# Patient Record
Sex: FEMALE | Race: WHITE | NOT HISPANIC OR LATINO | Employment: OTHER | URBAN - METROPOLITAN AREA
[De-identification: names, ages, dates, MRNs, and addresses within clinical notes are randomized per-mention and may not be internally consistent; named-entity substitution may affect disease eponyms.]

---

## 2017-03-09 ENCOUNTER — TELEPHONE (OUTPATIENT)
Dept: INTERNAL MEDICINE | Facility: CLINIC | Age: 67
End: 2017-03-09

## 2017-03-09 NOTE — TELEPHONE ENCOUNTER
----- Message from Niyah Sutherland sent at 3/9/2017  2:59 PM CST -----  Contact: Self  Pt would like a call back in regards to scheduling an appt to establish care.     Pt can be contacted at [phone]

## 2017-03-09 NOTE — TELEPHONE ENCOUNTER
Left message to inform pt that dr first avaliable appt to est care is not until 03/20 so she should give us a call back so we can help her schedule.

## 2017-03-14 ENCOUNTER — TELEPHONE (OUTPATIENT)
Dept: ORTHOPEDICS | Facility: CLINIC | Age: 67
End: 2017-03-14

## 2017-03-14 DIAGNOSIS — R52 PAIN: Primary | ICD-10-CM

## 2017-03-15 ENCOUNTER — HOSPITAL ENCOUNTER (OUTPATIENT)
Dept: RADIOLOGY | Facility: OTHER | Age: 67
Discharge: HOME | End: 2017-03-15
Attending: PLASTIC SURGERY
Payer: COMMERCIAL

## 2017-03-15 ENCOUNTER — OFFICE VISIT (OUTPATIENT)
Dept: ORTHOPEDICS | Facility: CLINIC | Age: 67
End: 2017-03-15
Payer: COMMERCIAL

## 2017-03-15 VITALS
DIASTOLIC BLOOD PRESSURE: 84 MMHG | WEIGHT: 170 LBS | HEART RATE: 71 BPM | BODY MASS INDEX: 29.02 KG/M2 | SYSTOLIC BLOOD PRESSURE: 147 MMHG | HEIGHT: 64 IN

## 2017-03-15 DIAGNOSIS — M25.531 WRIST PAIN, ACUTE, RIGHT: ICD-10-CM

## 2017-03-15 DIAGNOSIS — R52 PAIN: ICD-10-CM

## 2017-03-15 DIAGNOSIS — M19.041 PRIMARY OSTEOARTHRITIS OF BOTH HANDS: Primary | ICD-10-CM

## 2017-03-15 DIAGNOSIS — M19.042 PRIMARY OSTEOARTHRITIS OF BOTH HANDS: Primary | ICD-10-CM

## 2017-03-15 PROCEDURE — 1159F MED LIST DOCD IN RCRD: CPT | Mod: S$GLB,,, | Performed by: PLASTIC SURGERY

## 2017-03-15 PROCEDURE — 99999 PR PBB SHADOW E&M-EST. PATIENT-LVL III: CPT | Mod: PBBFAC,,, | Performed by: PLASTIC SURGERY

## 2017-03-15 PROCEDURE — 99203 OFFICE O/P NEW LOW 30 MIN: CPT | Mod: S$GLB,,, | Performed by: PLASTIC SURGERY

## 2017-03-15 PROCEDURE — 3077F SYST BP >= 140 MM HG: CPT | Mod: S$GLB,,, | Performed by: PLASTIC SURGERY

## 2017-03-15 PROCEDURE — 1157F ADVNC CARE PLAN IN RCRD: CPT | Mod: S$GLB,,, | Performed by: PLASTIC SURGERY

## 2017-03-15 PROCEDURE — 1160F RVW MEDS BY RX/DR IN RCRD: CPT | Mod: S$GLB,,, | Performed by: PLASTIC SURGERY

## 2017-03-15 PROCEDURE — 73130 X-RAY EXAM OF HAND: CPT | Mod: 26,RT,, | Performed by: RADIOLOGY

## 2017-03-15 PROCEDURE — 1125F AMNT PAIN NOTED PAIN PRSNT: CPT | Mod: S$GLB,,, | Performed by: PLASTIC SURGERY

## 2017-03-15 PROCEDURE — 3079F DIAST BP 80-89 MM HG: CPT | Mod: S$GLB,,, | Performed by: PLASTIC SURGERY

## 2017-03-15 PROCEDURE — 73130 X-RAY EXAM OF HAND: CPT | Mod: TC,RT

## 2017-03-15 NOTE — PROGRESS NOTES
HISTORY OF PRESENT ILLNESS:  Ms. Floyd is a right-hand dominant female who   presents today with a one-month history of ulnar-sided wrist pain.  She states   that she tends to cook a lot and also enjoys gardening.  She has noticed   increased swelling and pain on the ulnar side of her wrist.  She has been   diagnosed with carpal tunnel in the past; however, she has not undergone any   previous surgeries.  She also complains of occasional numbness and tingling   throughout the hand.  She denies any specific instances of trauma recently or   previously.  She recently moved from [facility] and is establishing new   physicians here in the .  She denies any loss of using of the hand.  She has   been using Tylenol for occasional pain control.  She also has a splint, which   she states has become rather worn and would like a new splint today in clinic.    She has no other complaints today.    Past Medical History:   Diagnosis Date    CAD (coronary artery disease)     Outside [facility] 6/2014: 20% mLAD, 50% mCx, 20%, mRCA    Carotid stenosis     R CCA stent, L CCA 60%    Dyslipidemia     ESBL (extended spectrum beta-lactamase) producing bacteria infection     UTI    Hypertension     SAH (subarachnoid hemorrhage)     1999, from ruptured aneurysm, s/p clip       History reviewed. No pertinent surgical history.    Social History     Social History    Marital status:      Spouse name: N/A    Number of children: N/A    Years of education: N/A     Occupational History    Not on file.     Social History Main Topics    Smoking status: Former Smoker     Quit date: 1/1/1999    Smokeless tobacco: Not on file    Alcohol use 4.2 oz/week     7 Glasses of wine per week    Drug use: Not on file    Sexual activity: Not on file     Other Topics Concern    Not on file     Social History Narrative       Current Outpatient Prescriptions on File Prior to Visit   Medication Sig Dispense Refill    amlodipine-valsartan (EXFORGE)  " mg per tablet Take 1 tablet by mouth once daily. 30 tablet 11    aspirin (ECOTRIN) 81 MG EC tablet Take 1 tablet (81 mg total) by mouth once daily.  0    atorvastatin (LIPITOR) 80 MG tablet Take 1 tablet (80 mg total) by mouth once daily. 30 tablet 11    carvedilol (COREG) 12.5 MG tablet Take 1 tablet (12.5 mg total) by mouth 2 (two) times daily with meals. 60 tablet 11    ezetimibe (ZETIA) 10 mg tablet Take 1 tablet (10 mg total) by mouth once daily. 30 tablet 11    hydrochlorothiazide (HYDRODIURIL) 25 MG tablet Take 1 tablet (25 mg total) by mouth once daily. 30 tablet 11    pantoprazole (PROTONIX) 40 MG tablet Take 40 mg by mouth once daily.       No current facility-administered medications on file prior to visit.        Review of patient's allergies indicates:  No Known Allergies    Review of Systems:  Constitutional: no fever or chills  ENT: no nasal congestion or sore throat  Respiratory: no cough or shortness of breath  Cardiovascular: no chest pain or palpitations  Gastrointestinal: no nausea or vomiting  Genitourinary: no hematuria or dysuria  Integument/Breast: no rash or pruritis  Hematologic/Lymphatic: no easy bruising or lymphadenopathy  Musculoskeletal: see HPI  Neurological: no seizures or tremors  Behavioral/Psych: no auditory or visual hallucinations      PHYSICAL EXAM    Vitals:    03/15/17 0810 03/15/17 0811   BP: (!) 147/84    Pulse: 71    Weight: 77.1 kg (170 lb)    Height: 5' 4" (1.626 m)    PainSc:   3   3   PainLoc: Hand          PHYSICAL EXAMINATION:  GENERAL:  No acute distress, alert and oriented x3, cooperative, well nourished.  LUNGS:  Nonlabored on room air.  CARDIOVASCULAR:  Distal pulses intact.  Good capillary refill.  No clubbing,   cyanosis or edema.  EXTREMITIES:  Right upper extremity:  Negative Tinel's over the cubital tunnel   and carpal tunnel.  She has a negative Durkan sign.  She has a positive Tinel's   over the [place] canal.  She has full active range of " motion of all digits at all   joints.  There are Heberden's and Dalton's nodules noted on the PIP and DIP   joints throughout.  There is ulnar drift at the MCP joints with no external   evidence of subluxation at the MCP joints.  There is stable DRUJ joint to shuck,   mild tenderness to compression or swelling on the ulnar side of the wrist near   the ulnar fovea with pain to direct pressure.  There is no subluxation of the   ECU tendon.  She has near full extension and flexion of the wrist at the   radiocarpal joint.    RADIOLOGY IMPRESSION:    PA, lateral, and oblique radiographs of the right hand were obtained.  The bones are intact.  There is no evidence for acute fracture or bone destruction.  There are prominent degenerative changes identified within some of the small joints of the hand, most prominently the interphalangeal joint of the right thumb, the proximal interphalangeal joint of the right 4th finger, and the distal interphalangeal joint of the right 2nd finger.  There appears to be mild subluxation involving the right 2nd and 3rd metacarpal phalangeal joints as well as the DIP joint of the right index finger.  No bony erosions are identified.  Soft tissues are unremarkable.   Impression     No evidence for acute fracture, bone destruction, or dislocation.  Osteoarthritic type changes involving multiple joints within the right hand as detailed above.  Mild subluxation involving the right 2nd and 3rd metacarpal phalangeal joints and the distal interphalangeal joint of the right index finger.           ASSESSMENT:  Ulnar-sided wrist pain.    PLAN:  Based on the x-ray findings and findings on exam, it appears that the   patient has diffuse arthritis throughout the joints of the hand.  There is   subluxation of the second and third MCP joints of the right hand, concerning for   possible underlying rheumatoid arthritis.  At this time, I do not see any   definitive signs of trauma or pathology that would be  attributing to her   ulnar-sided wrist pain.  This may be attributed to underlying rheumatoid   arthritis caused by synovitis.  Therefore, I recommended that the patient rest   her wrist.  She was provided with a wrist splint today in clinic.  She is to   wear this around the clock except for hygiene.  She was encouraged to use   Tylenol for anti-inflammatory properties.  I discussed obtaining an MRI;   however, the patient is unable to receive this test due to an aneurysmal clip   within the brain.  Therefore, we will monitor her symptoms after immobilization   for four weeks.  I will obtain a Rheumatology workup today to assess for any   autoimmune conditions.  The patient will follow up with me in two months when   she returns from her foreign travels.  She agreed with the above assessment and   plan.  All questions and concerns were addressed prior to discharge.      MARIAN/CHITRA  dd: 03/15/2017 09:56:47 (CDT)  td: 03/16/2017 01:31:29 (CDT)  Doc ID   #[id number]  Job ID #[id number]    CC:       Dictation Confirmation Code: [id number]  Ar Gordon Jr. MD  03/15/2017  9:51 AM

## 2017-03-20 ENCOUNTER — LAB VISIT (OUTPATIENT)
Dept: LAB | Facility: OTHER | Age: 67
End: 2017-03-20
Attending: INTERNAL MEDICINE
Payer: COMMERCIAL

## 2017-03-20 ENCOUNTER — OFFICE VISIT (OUTPATIENT)
Dept: INTERNAL MEDICINE | Facility: CLINIC | Age: 67
End: 2017-03-20
Payer: COMMERCIAL

## 2017-03-20 VITALS
WEIGHT: 166 LBS | DIASTOLIC BLOOD PRESSURE: 70 MMHG | HEIGHT: 64 IN | SYSTOLIC BLOOD PRESSURE: 120 MMHG | BODY MASS INDEX: 28.34 KG/M2 | OXYGEN SATURATION: 96 % | HEART RATE: 84 BPM

## 2017-03-20 DIAGNOSIS — Z11.59 NEED FOR HEPATITIS C SCREENING TEST: ICD-10-CM

## 2017-03-20 DIAGNOSIS — N28.9 RENAL INSUFFICIENCY: ICD-10-CM

## 2017-03-20 DIAGNOSIS — K21.9 GASTROESOPHAGEAL REFLUX DISEASE WITHOUT ESOPHAGITIS: ICD-10-CM

## 2017-03-20 DIAGNOSIS — I10 ESSENTIAL HYPERTENSION: ICD-10-CM

## 2017-03-20 DIAGNOSIS — I10 ESSENTIAL HYPERTENSION: Primary | ICD-10-CM

## 2017-03-20 LAB
ANION GAP SERPL CALC-SCNC: 11 MMOL/L
BACTERIA #/AREA URNS HPF: NORMAL /HPF
BILIRUB UR QL STRIP: NEGATIVE
BUN SERPL-MCNC: 32 MG/DL
CALCIUM SERPL-MCNC: 9.3 MG/DL
CHLORIDE SERPL-SCNC: 105 MMOL/L
CLARITY UR: CLEAR
CO2 SERPL-SCNC: 23 MMOL/L
COLOR UR: YELLOW
CREAT SERPL-MCNC: 2 MG/DL
EST. GFR  (AFRICAN AMERICAN): 29 ML/MIN/1.73 M^2
EST. GFR  (NON AFRICAN AMERICAN): 25 ML/MIN/1.73 M^2
GLUCOSE SERPL-MCNC: 102 MG/DL
GLUCOSE UR QL STRIP: NEGATIVE
HGB UR QL STRIP: NEGATIVE
KETONES UR QL STRIP: NEGATIVE
LEUKOCYTE ESTERASE UR QL STRIP: ABNORMAL
MICROSCOPIC COMMENT: NORMAL
NITRITE UR QL STRIP: NEGATIVE
PH UR STRIP: 6 [PH] (ref 5–8)
POTASSIUM SERPL-SCNC: 4.6 MMOL/L
PROT UR QL STRIP: NEGATIVE
SODIUM SERPL-SCNC: 139 MMOL/L
SP GR UR STRIP: 1.01 (ref 1–1.03)
SQUAMOUS #/AREA URNS HPF: 2 /HPF
TSH SERPL DL<=0.005 MIU/L-ACNC: 1.31 UIU/ML
URN SPEC COLLECT METH UR: ABNORMAL
UROBILINOGEN UR STRIP-ACNC: NEGATIVE EU/DL
WBC #/AREA URNS HPF: 3 /HPF (ref 0–5)

## 2017-03-20 PROCEDURE — 3074F SYST BP LT 130 MM HG: CPT | Mod: S$GLB,,, | Performed by: INTERNAL MEDICINE

## 2017-03-20 PROCEDURE — 1160F RVW MEDS BY RX/DR IN RCRD: CPT | Mod: S$GLB,,, | Performed by: INTERNAL MEDICINE

## 2017-03-20 PROCEDURE — 3078F DIAST BP <80 MM HG: CPT | Mod: S$GLB,,, | Performed by: INTERNAL MEDICINE

## 2017-03-20 PROCEDURE — 36415 COLL VENOUS BLD VENIPUNCTURE: CPT

## 2017-03-20 PROCEDURE — 1126F AMNT PAIN NOTED NONE PRSNT: CPT | Mod: S$GLB,,, | Performed by: INTERNAL MEDICINE

## 2017-03-20 PROCEDURE — 99999 PR PBB SHADOW E&M-EST. PATIENT-LVL III: CPT | Mod: PBBFAC,,, | Performed by: INTERNAL MEDICINE

## 2017-03-20 PROCEDURE — 1157F ADVNC CARE PLAN IN RCRD: CPT | Mod: S$GLB,,, | Performed by: INTERNAL MEDICINE

## 2017-03-20 PROCEDURE — 81000 URINALYSIS NONAUTO W/SCOPE: CPT

## 2017-03-20 PROCEDURE — 80048 BASIC METABOLIC PNL TOTAL CA: CPT

## 2017-03-20 PROCEDURE — 99214 OFFICE O/P EST MOD 30 MIN: CPT | Mod: S$GLB,,, | Performed by: INTERNAL MEDICINE

## 2017-03-20 PROCEDURE — 84443 ASSAY THYROID STIM HORMONE: CPT

## 2017-03-20 PROCEDURE — 86803 HEPATITIS C AB TEST: CPT

## 2017-03-20 PROCEDURE — 1159F MED LIST DOCD IN RCRD: CPT | Mod: S$GLB,,, | Performed by: INTERNAL MEDICINE

## 2017-03-20 NOTE — MR AVS SNAPSHOT
[facility]  [address]  [place] [address]  Phone: [phone]  Fax: [phone]                  Jessica Floyd   3/20/2017 1:20 PM   Office Visit    Description:  Female : 1950   Provider:  Jose Peacock MD   Department:  [facility]           Reason for Visit     Annual Exam           Diagnoses this Visit        Comments    Essential hypertension    -  Primary     Renal insufficiency         Gastroesophageal reflux disease without esophagitis         Need for hepatitis C screening test                To Do List           Goals (5 Years of Data)     None      Follow-Up and Disposition     Return in about 6 months (around 2017), or if symptoms worsen or fail to improve.      [facility]     [facility] On Call Nurse Care Line -  Assistance  Registered nurses in the [facility] On Call Center provide clinical advisement, health education, appointment booking, and other advisory services.  Call for this free service at [phone].             Medications           Message regarding Medications     Verify the changes and/or additions to your medication regime listed below are the same as discussed with your clinician today.  If any of these changes or additions are incorrect, please notify your healthcare provider.             Verify that the below list of medications is an accurate representation of the medications you are currently taking.  If none reported, the list may be blank. If incorrect, please contact your healthcare provider. Carry this list with you in case of emergency.           Current Medications     amlodipine-valsartan (EXFORGE)  mg per tablet Take 1 tablet by mouth once daily.    aspirin (ECOTRIN) 81 MG EC tablet Take 1 tablet (81 mg total) by mouth once daily.    atorvastatin (LIPITOR) 80 MG tablet Take 1 tablet (80 mg total) by mouth once daily.    carvedilol (COREG) 12.5 MG tablet Take 1 tablet (12.5 mg total) by mouth  "2 (two) times daily with meals.    ezetimibe (ZETIA) 10 mg tablet Take 1 tablet (10 mg total) by mouth once daily.    hydrochlorothiazide (HYDRODIURIL) 25 MG tablet Take 1 tablet (25 mg total) by mouth once daily.    pantoprazole (PROTONIX) 40 MG tablet Take 40 mg by mouth once daily.           Clinical Reference Information           Your Vitals Were     BP Pulse Height Weight SpO2 BMI    120/70 84 5' 4" (1.626 m) 75.3 kg (166 lb 0.1 oz) 96% 28.49 kg/m2      Blood Pressure          Most Recent Value    BP  120/70      Allergies as of 3/20/2017     No Known Allergies      Immunizations Administered on Date of Encounter - 3/20/2017     None      Orders Placed During Today's Visit      Normal Orders This Visit    URINALYSIS     Future Labs/Procedures Expected by Expires    Basic metabolic panel  3/20/2017 5/19/2018    Hepatitis C antibody  3/20/2017 5/12/2018    TSH  3/20/2017 5/19/2018      [facility] Sign-Up     Activating your [facility] account is as easy as 1-2-3!     1) Visit [email or web address], select Sign Up Now, enter this activation code and your date of birth, then select Next.  [email or web address]  Expires: 5/4/2017  2:04 PM      2) Create a username and password to use when you visit [facility] in the future and select a security question in case you lose your password and select Next.    3) Enter your e-mail address and click Sign Up!    Additional Information  If you have questions, please e-mail [email or web address] or call [phone] to talk to our [facility] staff. Remember, [facility] is NOT to be used for urgent needs. For medical emergencies, dial 911.         Language Assistance Services     ATTENTION: Language assistance services are available, free of charge. Please call [phone].      ATENCIÓN: Si habla español, tiene a rhodes disposición servicios gratuitos de asistencia lingüística. Llame al [phone].     CHÚ Ý: N?u b?n nói Ti?ng Vi?t, có các d?ch v? h? tr? ngôn ng? mi?n phí dành cho b?n. " G?i s? [phone].         [facility] complies with applicable Federal civil rights laws and does not discriminate on the basis of race, color, national origin, age, disability, or sex.

## 2017-03-20 NOTE — PROGRESS NOTES
Subjective:       Patient ID: Jessica Floyd is a 66 y.o. female.    Chief Complaint: Annual Exam    HPI Comments:   New pt to this office.      She wants to switch her care to here.      Feeling well today.  No c/o.      PMH  -Ruptured cerebral aneurysm s/p repair w/clip.  Unable to get MRI for this reason.  Has a lasting L visual field cut.    -H/o multiple syncopal episodes  -HTN.  Pt says she adjusts her hctz dose QAM based on her home BP readings.   -Carotid artery stenosis s/p 2 stents.  R side.    -Unknown 'repair' on L knee dating to age 11.  She is unsure of the details.  She gets some pain from this.    -GERD.  Takes pantoprazole most days and this controls her sx.   -Denies having any heart disease.  Problem list includes CAD.        Review of Systems   Constitutional: Negative.    HENT: Negative.    Eyes: Negative.    Respiratory: Negative.    Cardiovascular: Negative.    Gastrointestinal: Negative.    Genitourinary: Negative.    Musculoskeletal: Negative.    Skin: Negative.    Neurological: Negative.    Psychiatric/Behavioral: Negative.        Past Medical History:   Diagnosis Date    CAD (coronary artery disease)     Outside [facility] 6/2014: 20% mLAD, 50% mCx, 20%, mRCA    Carotid stenosis     R CCA stent, L CCA 60%    Dyslipidemia     ESBL (extended spectrum beta-lactamase) producing bacteria infection     UTI    Hypertension     SAH (subarachnoid hemorrhage)     1999, from ruptured aneurysm, s/p clip       History reviewed. No pertinent surgical history.    Family History   Problem Relation Age of Onset    Aneurysm Mother     Alcohol abuse Father     Heart disease Brother        Social History     Social History    Marital status:      Spouse name: N/A    Number of children: N/A    Years of education: N/A     Occupational History    Retired      Social History Main Topics    Smoking status: Former Smoker     Quit date: 1/1/1999    Smokeless tobacco: None    Alcohol use 4.2 oz/week      "7 Glasses of wine per week    Drug use: No    Sexual activity: Not Asked     Other Topics Concern    None     Social History Narrative    .  Has 3 grown daughters.   lives in [place].       Objective:       Vitals:    03/20/17 1334   BP: 120/70   Pulse: 84   SpO2: 96%   Weight: 75.3 kg (166 lb 0.1 oz)   Height: 5' 4" (1.626 m)     Physical Exam   Constitutional: She is oriented to person, place, and time. She appears well-developed and well-nourished.   HENT:   Head: Normocephalic and atraumatic.   Right Ear: Tympanic membrane, external ear and ear canal normal.   Left Ear: Tympanic membrane, external ear and ear canal normal.   Mouth/Throat: Oropharynx is clear and moist. No oropharyngeal exudate or posterior oropharyngeal erythema.   Eyes: Conjunctivae and EOM are normal. Pupils are equal, round, and reactive to light.   Neck: Normal range of motion. Neck supple. Carotid bruit is not present. No thyromegaly present.   Cardiovascular: Normal rate, regular rhythm and normal heart sounds.  Exam reveals no gallop and no friction rub.    No murmur heard.  Pulmonary/Chest: Effort normal and breath sounds normal. She has no wheezes. She has no rales.   Abdominal: Soft. Bowel sounds are normal. She exhibits no distension. There is no tenderness. There is no rebound and no guarding.   Musculoskeletal: Normal range of motion. She exhibits no edema.   Lymphadenopathy:     She has no cervical adenopathy.   Neurological: She is alert and oriented to person, place, and time. She has normal strength. She displays no atrophy and no tremor. No sensory deficit. She exhibits normal muscle tone. Gait normal.   Skin: Skin is warm and dry. No rash noted.   Psychiatric: She has a normal mood and affect. Her behavior is normal. Thought content normal.   Vitals reviewed.      Assessment:       1. Essential hypertension    2. Renal insufficiency    3. Gastroesophageal reflux disease without esophagitis    4. Need for " hepatitis C screening test        Plan:           HTN - At goal.  Cont current tx.      Renal insufficiency - Seen on labs from 5d ago done by pt's Ortho hand specialist.  Will repeat Cr, K.  Get UA.      GERD - Controlled with PPI which pt takes most days.  Cont this.      HM - Chk HCV Ab.

## 2017-03-21 ENCOUNTER — TELEPHONE (OUTPATIENT)
Dept: INTERNAL MEDICINE | Facility: CLINIC | Age: 67
End: 2017-03-21

## 2017-03-21 DIAGNOSIS — N28.9 RENAL INSUFFICIENCY: Primary | ICD-10-CM

## 2017-03-21 LAB — HCV AB SERPL QL IA: NEGATIVE

## 2017-03-21 RX ORDER — AMLODIPINE BESYLATE 10 MG/1
10 TABLET ORAL DAILY
Qty: 90 TABLET | Refills: 1 | Status: SHIPPED | OUTPATIENT
Start: 2017-03-21 | End: 2017-09-06 | Stop reason: SDUPTHER

## 2017-03-21 NOTE — TELEPHONE ENCOUNTER
Please inform pt to stop her amlodipine-valsartan medication for now due to kidney dysfunction.  Since this is a combination medication and she only needs to stop the valsartan portion, if she would like I can make a prescription for amlodipine by itself.      Please also advise her to avoid all non-steroidal anti-inflammatory medications such as ibuprofen and naproxen for the time being.      We will need to repeat her labs again this coming Friday or early next week.  Please schedule these.

## 2017-03-21 NOTE — TELEPHONE ENCOUNTER
Pt informed of to stop taking Exforge due to kidney dysfunction and she should stop taking non steriodial med such as ibuprofen and naproxen. Pt labs schedule for repeat on 03/29. Pt also would like for you to send the script for Amlodipine by itself. Tammi advise

## 2017-03-29 ENCOUNTER — LAB VISIT (OUTPATIENT)
Dept: LAB | Facility: OTHER | Age: 67
End: 2017-03-29
Attending: INTERNAL MEDICINE
Payer: COMMERCIAL

## 2017-03-29 DIAGNOSIS — N28.9 RENAL INSUFFICIENCY: ICD-10-CM

## 2017-03-29 LAB
ANION GAP SERPL CALC-SCNC: 11 MMOL/L
BUN SERPL-MCNC: 15 MG/DL
CALCIUM SERPL-MCNC: 9.5 MG/DL
CHLORIDE SERPL-SCNC: 100 MMOL/L
CO2 SERPL-SCNC: 29 MMOL/L
CREAT SERPL-MCNC: 1.3 MG/DL
EST. GFR  (AFRICAN AMERICAN): 49 ML/MIN/1.73 M^2
EST. GFR  (NON AFRICAN AMERICAN): 43 ML/MIN/1.73 M^2
GLUCOSE SERPL-MCNC: 95 MG/DL
POTASSIUM SERPL-SCNC: 3.6 MMOL/L
SODIUM SERPL-SCNC: 140 MMOL/L

## 2017-03-29 PROCEDURE — 36415 COLL VENOUS BLD VENIPUNCTURE: CPT

## 2017-03-29 PROCEDURE — 80048 BASIC METABOLIC PNL TOTAL CA: CPT

## 2017-03-30 ENCOUNTER — TELEPHONE (OUTPATIENT)
Dept: INTERNAL MEDICINE | Facility: CLINIC | Age: 67
End: 2017-03-30

## 2017-03-30 DIAGNOSIS — N28.9 RENAL INSUFFICIENCY: Primary | ICD-10-CM

## 2017-03-30 NOTE — TELEPHONE ENCOUNTER
Please advise pt that her kidney function has improved and also remains somewhat abnormal.  We do not have old records to help me to determine if it has returned to her baseline or not.      Please find out if she has been monitoring her blood pressure and, if so, what has it been like over the past week or so?

## 2017-03-30 NOTE — TELEPHONE ENCOUNTER
I'm happy to hear her BP has been doing well without the valsartan and I think she should continue without it for now.  She should continue to monitor her BP and let us know if it is frequently running above 140 or above 90.      I would like to check her kidney function again in 3-4 weeks.  Please arrange for that.

## 2017-03-30 NOTE — TELEPHONE ENCOUNTER
Pt inform of  wants her to continue monitoring her B/P and labs set on 05/11 when she returns to clinic pt states she will not be back until that time,

## 2017-03-30 NOTE — TELEPHONE ENCOUNTER
Pt inform that her kidney furnctions are improved and she states for the last week her B/P has been running 127/133 for systolic and between 70/87 for bottom number. Pt also states she will be out of state and the only way to reach her would be by texting. Please advise.

## 2017-05-05 ENCOUNTER — TELEPHONE (OUTPATIENT)
Dept: INTERNAL MEDICINE | Facility: CLINIC | Age: 67
End: 2017-05-05

## 2017-05-15 ENCOUNTER — OFFICE VISIT (OUTPATIENT)
Dept: INTERNAL MEDICINE | Facility: CLINIC | Age: 67
End: 2017-05-15
Payer: COMMERCIAL

## 2017-05-15 ENCOUNTER — LAB VISIT (OUTPATIENT)
Dept: LAB | Facility: OTHER | Age: 67
End: 2017-05-15
Attending: INTERNAL MEDICINE
Payer: COMMERCIAL

## 2017-05-15 VITALS
BODY MASS INDEX: 27.92 KG/M2 | HEIGHT: 64 IN | DIASTOLIC BLOOD PRESSURE: 78 MMHG | OXYGEN SATURATION: 97 % | WEIGHT: 163.56 LBS | HEART RATE: 75 BPM | SYSTOLIC BLOOD PRESSURE: 128 MMHG

## 2017-05-15 DIAGNOSIS — I10 ESSENTIAL HYPERTENSION: Primary | ICD-10-CM

## 2017-05-15 DIAGNOSIS — N28.9 RENAL INSUFFICIENCY: ICD-10-CM

## 2017-05-15 DIAGNOSIS — Z00.00 ROUTINE HISTORY AND PHYSICAL EXAMINATION OF ADULT: ICD-10-CM

## 2017-05-15 LAB
ALBUMIN SERPL BCP-MCNC: 3.8 G/DL
ALP SERPL-CCNC: 57 U/L
ALT SERPL W/O P-5'-P-CCNC: 19 U/L
ANION GAP SERPL CALC-SCNC: 13 MMOL/L
ANION GAP SERPL CALC-SCNC: 13 MMOL/L
AST SERPL-CCNC: 23 U/L
BASOPHILS # BLD AUTO: 0.11 K/UL
BASOPHILS NFR BLD: 1.6 %
BILIRUB SERPL-MCNC: 0.7 MG/DL
BUN SERPL-MCNC: 18 MG/DL
BUN SERPL-MCNC: 18 MG/DL
CALCIUM SERPL-MCNC: 9.4 MG/DL
CALCIUM SERPL-MCNC: 9.4 MG/DL
CHLORIDE SERPL-SCNC: 102 MMOL/L
CHLORIDE SERPL-SCNC: 102 MMOL/L
CO2 SERPL-SCNC: 25 MMOL/L
CO2 SERPL-SCNC: 25 MMOL/L
CREAT SERPL-MCNC: 1.2 MG/DL
CREAT SERPL-MCNC: 1.2 MG/DL
DIFFERENTIAL METHOD: ABNORMAL
EOSINOPHIL # BLD AUTO: 0.4 K/UL
EOSINOPHIL NFR BLD: 5.3 %
ERYTHROCYTE [DISTWIDTH] IN BLOOD BY AUTOMATED COUNT: 14.5 %
EST. GFR  (AFRICAN AMERICAN): 54 ML/MIN/1.73 M^2
EST. GFR  (AFRICAN AMERICAN): 54 ML/MIN/1.73 M^2
EST. GFR  (NON AFRICAN AMERICAN): 47 ML/MIN/1.73 M^2
EST. GFR  (NON AFRICAN AMERICAN): 47 ML/MIN/1.73 M^2
GLUCOSE SERPL-MCNC: 88 MG/DL
GLUCOSE SERPL-MCNC: 88 MG/DL
HCT VFR BLD AUTO: 38.6 %
HCV AB SERPL QL IA: NEGATIVE
HGB BLD-MCNC: 12.9 G/DL
LYMPHOCYTES # BLD AUTO: 2.2 K/UL
LYMPHOCYTES NFR BLD: 32.2 %
MCH RBC QN AUTO: 33.6 PG
MCHC RBC AUTO-ENTMCNC: 33.4 %
MCV RBC AUTO: 101 FL
MONOCYTES # BLD AUTO: 1 K/UL
MONOCYTES NFR BLD: 14.9 %
NEUTROPHILS # BLD AUTO: 3.2 K/UL
NEUTROPHILS NFR BLD: 45.7 %
PLATELET # BLD AUTO: 272 K/UL
PMV BLD AUTO: 10.8 FL
POTASSIUM SERPL-SCNC: 3.9 MMOL/L
POTASSIUM SERPL-SCNC: 3.9 MMOL/L
PROT SERPL-MCNC: 7.3 G/DL
RBC # BLD AUTO: 3.84 M/UL
SODIUM SERPL-SCNC: 140 MMOL/L
SODIUM SERPL-SCNC: 140 MMOL/L
WBC # BLD AUTO: 6.92 K/UL

## 2017-05-15 PROCEDURE — 99999 PR PBB SHADOW E&M-EST. PATIENT-LVL III: CPT | Mod: PBBFAC,,, | Performed by: INTERNAL MEDICINE

## 2017-05-15 PROCEDURE — 1125F AMNT PAIN NOTED PAIN PRSNT: CPT | Mod: S$GLB,,, | Performed by: INTERNAL MEDICINE

## 2017-05-15 PROCEDURE — 1160F RVW MEDS BY RX/DR IN RCRD: CPT | Mod: S$GLB,,, | Performed by: INTERNAL MEDICINE

## 2017-05-15 PROCEDURE — 1157F ADVNC CARE PLAN IN RCRD: CPT | Mod: 8P,S$GLB,, | Performed by: INTERNAL MEDICINE

## 2017-05-15 PROCEDURE — 85025 COMPLETE CBC W/AUTO DIFF WBC: CPT

## 2017-05-15 PROCEDURE — 86803 HEPATITIS C AB TEST: CPT

## 2017-05-15 PROCEDURE — 99214 OFFICE O/P EST MOD 30 MIN: CPT | Mod: S$GLB,,, | Performed by: INTERNAL MEDICINE

## 2017-05-15 PROCEDURE — 3078F DIAST BP <80 MM HG: CPT | Mod: S$GLB,,, | Performed by: INTERNAL MEDICINE

## 2017-05-15 PROCEDURE — 1159F MED LIST DOCD IN RCRD: CPT | Mod: S$GLB,,, | Performed by: INTERNAL MEDICINE

## 2017-05-15 PROCEDURE — 80053 COMPREHEN METABOLIC PANEL: CPT

## 2017-05-15 PROCEDURE — 36415 COLL VENOUS BLD VENIPUNCTURE: CPT

## 2017-05-15 PROCEDURE — 3074F SYST BP LT 130 MM HG: CPT | Mod: S$GLB,,, | Performed by: INTERNAL MEDICINE

## 2017-05-15 NOTE — PROGRESS NOTES
"Subjective:       Patient ID: Jessica Floyd is a 66 y.o. female.    Chief Complaint: Hypertension    HPI Comments:   Pt here for f/u HTN and renal insufficiency.      Feeling well.  No new c/o.     BP has been doing well when pt checks it at home.  She takes hctz either 1/2 pill or 1 whole pill, depending on BP.      She is having foot surgery next week with Dr Ervin.          Hypertension       Review of Systems   Constitutional: Negative.    HENT: Negative.    Eyes: Negative.    Respiratory: Negative.        Objective:       Vitals:    05/15/17 1150   BP: 128/78   Pulse: 75   SpO2: 97%   Weight: 74.2 kg (163 lb 9.3 oz)   Height: 5' 4" (1.626 m)     Physical Exam   Constitutional: She appears well-developed and well-nourished. No distress.   HENT:   Head: Normocephalic and atraumatic.   Eyes: Conjunctivae and EOM are normal. Pupils are equal, round, and reactive to light.   Skin: No rash noted. She is not diaphoretic.   Psychiatric: She has a normal mood and affect. Her behavior is normal. Thought content normal.       Assessment:       1. Essential hypertension    2. Renal insufficiency        Plan:           HTN - At goal.  Cont current tx.    Renal insufficiency - Labs drawn earlier and are pending.  Pt may have CKD.  We are trying to establish a baseline for her renal fxn.  If she does, would benefit from an ACEI, which perhaps could take place of her HCTZ.        "

## 2017-05-15 NOTE — MR AVS SNAPSHOT
[facility]  [address]  [place] [address]  Phone: [phone]  Fax: [phone]                  Jessica Floyd   5/15/2017 11:40 AM   Office Visit    Description:  Female : 1950   Provider:  Jose Peacock MD   Department:  [facility]           Reason for Visit     Hypertension           Diagnoses this Visit        Comments    Essential hypertension    -  Primary     Renal insufficiency                To Do List           Goals (5 Years of Data)     None      Follow-Up and Disposition     Return in about 6 months (around 11/15/2017), or if symptoms worsen or fail to improve.      [facility]     [facility] Nurse Care Line -  Assistance  Unless otherwise directed by your provider, please contact [facility], our nurse care line that is available for  assistance.     Registered nurses in the [facility] Center provide: appointment scheduling, clinical advisement, health education, and other advisory services.  Call: [phone] (toll free)               Medications           Message regarding Medications     Verify the changes and/or additions to your medication regime listed below are the same as discussed with your clinician today.  If any of these changes or additions are incorrect, please notify your healthcare provider.             Verify that the below list of medications is an accurate representation of the medications you are currently taking.  If none reported, the list may be blank. If incorrect, please contact your healthcare provider. Carry this list with you in case of emergency.           Current Medications     amlodipine (NORVASC) 10 MG tablet Take 1 tablet (10 mg total) by mouth once daily.    aspirin (ECOTRIN) 81 MG EC tablet Take 1 tablet (81 mg total) by mouth once daily.    atorvastatin (LIPITOR) 80 MG tablet Take 1 tablet (80 mg total) by mouth once daily.    carvedilol (COREG) 12.5 MG tablet Take 1  "tablet (12.5 mg total) by mouth 2 (two) times daily with meals.    hydrochlorothiazide (HYDRODIURIL) 25 MG tablet Take 1 tablet (25 mg total) by mouth once daily.    pantoprazole (PROTONIX) 40 MG tablet Take 40 mg by mouth once daily.    ezetimibe (ZETIA) 10 mg tablet Take 1 tablet (10 mg total) by mouth once daily.           Clinical Reference Information           Your Vitals Were     BP Pulse Height Weight SpO2 BMI    128/78 75 5' 4" (1.626 m) 74.2 kg (163 lb 9.3 oz) 97% 28.08 kg/m2      Blood Pressure          Most Recent Value    BP  128/78      Allergies as of 5/15/2017     No Known Allergies      Immunizations Administered on Date of Encounter - 5/15/2017     None      [facility] Sign-Up     Activating your [facility] account is as easy as 1-2-3!     1) Visit [email or web address], select Sign Up Now, enter this activation code and your date of birth, then select Next.  [email or web address]  Expires: 6/29/2017 12:06 PM      2) Create a username and password to use when you visit [facility] in the future and select a security question in case you lose your password and select Next.    3) Enter your e-mail address and click Sign Up!    Additional Information  If you have questions, please e-mail [email or web address] or call [phone] to talk to our [facility] staff. Remember, [facility] is NOT to be used for urgent needs. For medical emergencies, dial 911.         Language Assistance Services     ATTENTION: Language assistance services are available, free of charge. Please call [phone].      ATENCIÓN: Si habla español, tiene a rhodes disposición servicios gratuitos de asistencia lingüística. Llame al [phone].     CHÚ Ý: N?u b?n nói Ti?ng Vi?t, có các d?ch v? h? tr? ngôn ng? mi?n phí dành cho b?n. G?i s? [phone].         [facility] complies with applicable Federal civil rights laws and does not discriminate on the basis of race, color, national origin, age, disability, or sex.        "

## 2017-05-16 ENCOUNTER — TELEPHONE (OUTPATIENT)
Dept: INTERNAL MEDICINE | Facility: CLINIC | Age: 67
End: 2017-05-16

## 2017-05-16 DIAGNOSIS — N18.30 CKD (CHRONIC KIDNEY DISEASE) STAGE 3, GFR 30-59 ML/MIN: ICD-10-CM

## 2017-05-25 ENCOUNTER — OFFICE VISIT (OUTPATIENT)
Dept: CARDIOLOGY | Facility: CLINIC | Age: 67
End: 2017-05-25
Payer: COMMERCIAL

## 2017-05-25 ENCOUNTER — NURSE TRIAGE (OUTPATIENT)
Dept: ADMINISTRATIVE | Facility: CLINIC | Age: 67
End: 2017-05-25

## 2017-05-25 ENCOUNTER — TELEPHONE (OUTPATIENT)
Dept: INTERNAL MEDICINE | Facility: CLINIC | Age: 67
End: 2017-05-25

## 2017-05-25 ENCOUNTER — HOSPITAL ENCOUNTER (OUTPATIENT)
Dept: CARDIOLOGY | Facility: CLINIC | Age: 67
Discharge: HOME | End: 2017-05-25
Payer: COMMERCIAL

## 2017-05-25 ENCOUNTER — HOSPITAL ENCOUNTER (OUTPATIENT)
Facility: HOSPITAL | Age: 67
LOS: 1 days | Discharge: HOME | End: 2017-05-26
Attending: EMERGENCY MEDICINE | Admitting: INTERNAL MEDICINE
Payer: COMMERCIAL

## 2017-05-25 VITALS — HEIGHT: 65 IN | HEART RATE: 84 BPM | SYSTOLIC BLOOD PRESSURE: 102 MMHG | DIASTOLIC BLOOD PRESSURE: 66 MMHG

## 2017-05-25 DIAGNOSIS — I25.10 CORONARY ARTERY DISEASE INVOLVING NATIVE CORONARY ARTERY OF NATIVE HEART WITHOUT ANGINA PECTORIS: ICD-10-CM

## 2017-05-25 DIAGNOSIS — R07.9 CHEST PAIN: ICD-10-CM

## 2017-05-25 DIAGNOSIS — E78.5 DYSLIPIDEMIA: ICD-10-CM

## 2017-05-25 DIAGNOSIS — I10 ESSENTIAL HYPERTENSION: ICD-10-CM

## 2017-05-25 DIAGNOSIS — R07.9 CHEST PAIN, UNSPECIFIED TYPE: Primary | ICD-10-CM

## 2017-05-25 DIAGNOSIS — R07.9 CHEST PAIN AT REST: Primary | ICD-10-CM

## 2017-05-25 DIAGNOSIS — R07.9 CHEST PAIN, UNSPECIFIED TYPE: ICD-10-CM

## 2017-05-25 DIAGNOSIS — R07.9 CHEST PAIN, RULE OUT ACUTE MYOCARDIAL INFARCTION: ICD-10-CM

## 2017-05-25 LAB
ALBUMIN SERPL BCP-MCNC: 4 G/DL
ALP SERPL-CCNC: 75 U/L
ALT SERPL W/O P-5'-P-CCNC: 33 U/L
ANION GAP SERPL CALC-SCNC: 13 MMOL/L
AST SERPL-CCNC: 36 U/L
BASOPHILS # BLD AUTO: 0.04 K/UL
BASOPHILS NFR BLD: 0.3 %
BILIRUB SERPL-MCNC: 0.8 MG/DL
BNP SERPL-MCNC: 75 PG/ML
BUN SERPL-MCNC: 14 MG/DL
CALCIUM SERPL-MCNC: 10 MG/DL
CHLORIDE SERPL-SCNC: 96 MMOL/L
CO2 SERPL-SCNC: 28 MMOL/L
CREAT SERPL-MCNC: 1.1 MG/DL
DIFFERENTIAL METHOD: ABNORMAL
EOSINOPHIL # BLD AUTO: 0 K/UL
EOSINOPHIL NFR BLD: 0.2 %
ERYTHROCYTE [DISTWIDTH] IN BLOOD BY AUTOMATED COUNT: 14.6 %
EST. GFR  (AFRICAN AMERICAN): >60 ML/MIN/1.73 M^2
EST. GFR  (NON AFRICAN AMERICAN): 52.1 ML/MIN/1.73 M^2
GLUCOSE SERPL-MCNC: 118 MG/DL
HCT VFR BLD AUTO: 42 %
HGB BLD-MCNC: 14.4 G/DL
LYMPHOCYTES # BLD AUTO: 2.7 K/UL
LYMPHOCYTES NFR BLD: 22.3 %
MCH RBC QN AUTO: 34 PG
MCHC RBC AUTO-ENTMCNC: 34.3 %
MCV RBC AUTO: 99 FL
MONOCYTES # BLD AUTO: 1.8 K/UL
MONOCYTES NFR BLD: 14.9 %
NEUTROPHILS # BLD AUTO: 7.4 K/UL
NEUTROPHILS NFR BLD: 62 %
PLATELET # BLD AUTO: 267 K/UL
PMV BLD AUTO: 10.3 FL
POTASSIUM SERPL-SCNC: 3 MMOL/L
PROT SERPL-MCNC: 7.9 G/DL
RBC # BLD AUTO: 4.23 M/UL
SODIUM SERPL-SCNC: 137 MMOL/L
TROPONIN I SERPL DL<=0.01 NG/ML-MCNC: <0.006 NG/ML
WBC # BLD AUTO: 11.91 K/UL

## 2017-05-25 PROCEDURE — 63600175 PHARM REV CODE 636 W HCPCS: Performed by: PHYSICIAN ASSISTANT

## 2017-05-25 PROCEDURE — 84443 ASSAY THYROID STIM HORMONE: CPT

## 2017-05-25 PROCEDURE — 80053 COMPREHEN METABOLIC PANEL: CPT

## 2017-05-25 PROCEDURE — 96374 THER/PROPH/DIAG INJ IV PUSH: CPT

## 2017-05-25 PROCEDURE — 99285 EMERGENCY DEPT VISIT HI MDM: CPT | Mod: ,,, | Performed by: PHYSICIAN ASSISTANT

## 2017-05-25 PROCEDURE — G0378 HOSPITAL OBSERVATION PER HR: HCPCS

## 2017-05-25 PROCEDURE — 85025 COMPLETE CBC W/AUTO DIFF WBC: CPT

## 2017-05-25 PROCEDURE — 84484 ASSAY OF TROPONIN QUANT: CPT

## 2017-05-25 PROCEDURE — 84100 ASSAY OF PHOSPHORUS: CPT

## 2017-05-25 PROCEDURE — 99999 PR PBB SHADOW E&M-EST. PATIENT-LVL III: CPT | Mod: PBBFAC,,, | Performed by: INTERNAL MEDICINE

## 2017-05-25 PROCEDURE — 99220 PR INITIAL OBSERVATION CARE,LEVL III: CPT | Mod: ,,, | Performed by: PHYSICIAN ASSISTANT

## 2017-05-25 PROCEDURE — 99214 OFFICE O/P EST MOD 30 MIN: CPT | Mod: S$GLB,,, | Performed by: INTERNAL MEDICINE

## 2017-05-25 PROCEDURE — 1159F MED LIST DOCD IN RCRD: CPT | Mod: S$GLB,,, | Performed by: INTERNAL MEDICINE

## 2017-05-25 PROCEDURE — 93010 ELECTROCARDIOGRAM REPORT: CPT | Mod: ,,, | Performed by: INTERNAL MEDICINE

## 2017-05-25 PROCEDURE — 83880 ASSAY OF NATRIURETIC PEPTIDE: CPT

## 2017-05-25 PROCEDURE — 25500020 PHARM REV CODE 255: Performed by: EMERGENCY MEDICINE

## 2017-05-25 PROCEDURE — 99285 EMERGENCY DEPT VISIT HI MDM: CPT | Mod: 25

## 2017-05-25 PROCEDURE — 1157F ADVNC CARE PLAN IN RCRD: CPT | Mod: 8P,S$GLB,, | Performed by: INTERNAL MEDICINE

## 2017-05-25 PROCEDURE — 25000003 PHARM REV CODE 250: Performed by: PHYSICIAN ASSISTANT

## 2017-05-25 PROCEDURE — 84439 ASSAY OF FREE THYROXINE: CPT

## 2017-05-25 PROCEDURE — 84484 ASSAY OF TROPONIN QUANT: CPT | Mod: 91

## 2017-05-25 PROCEDURE — 1125F AMNT PAIN NOTED PAIN PRSNT: CPT | Mod: S$GLB,,, | Performed by: INTERNAL MEDICINE

## 2017-05-25 PROCEDURE — 93000 ELECTROCARDIOGRAM COMPLETE: CPT | Mod: S$GLB,,, | Performed by: INTERNAL MEDICINE

## 2017-05-25 PROCEDURE — 36415 COLL VENOUS BLD VENIPUNCTURE: CPT

## 2017-05-25 PROCEDURE — 96375 TX/PRO/DX INJ NEW DRUG ADDON: CPT

## 2017-05-25 PROCEDURE — 93005 ELECTROCARDIOGRAM TRACING: CPT

## 2017-05-25 PROCEDURE — 83735 ASSAY OF MAGNESIUM: CPT

## 2017-05-25 RX ORDER — POTASSIUM CHLORIDE 20 MEQ/15ML
40 SOLUTION ORAL
Status: DISCONTINUED | OUTPATIENT
Start: 2017-05-25 | End: 2017-05-25

## 2017-05-25 RX ORDER — HYDROCODONE BITARTRATE AND ACETAMINOPHEN 5; 325 MG/1; MG/1
1 TABLET ORAL EVERY 4 HOURS PRN
Status: DISCONTINUED | OUTPATIENT
Start: 2017-05-25 | End: 2017-05-26 | Stop reason: HOSPADM

## 2017-05-25 RX ORDER — ONDANSETRON 2 MG/ML
4 INJECTION INTRAMUSCULAR; INTRAVENOUS
Status: COMPLETED | OUTPATIENT
Start: 2017-05-25 | End: 2017-05-25

## 2017-05-25 RX ORDER — MORPHINE SULFATE 2 MG/ML
4 INJECTION, SOLUTION INTRAMUSCULAR; INTRAVENOUS
Status: COMPLETED | OUTPATIENT
Start: 2017-05-25 | End: 2017-05-25

## 2017-05-25 RX ORDER — ASPIRIN 81 MG/1
81 TABLET ORAL DAILY
Status: DISCONTINUED | OUTPATIENT
Start: 2017-05-26 | End: 2017-05-26 | Stop reason: HOSPADM

## 2017-05-25 RX ORDER — CARVEDILOL 12.5 MG/1
12.5 TABLET ORAL 2 TIMES DAILY WITH MEALS
Status: DISCONTINUED | OUTPATIENT
Start: 2017-05-26 | End: 2017-05-26 | Stop reason: HOSPADM

## 2017-05-25 RX ORDER — IBUPROFEN 200 MG
16 TABLET ORAL
Status: DISCONTINUED | OUTPATIENT
Start: 2017-05-25 | End: 2017-05-26 | Stop reason: HOSPADM

## 2017-05-25 RX ORDER — ONDANSETRON 8 MG/1
8 TABLET, ORALLY DISINTEGRATING ORAL EVERY 8 HOURS PRN
Status: DISCONTINUED | OUTPATIENT
Start: 2017-05-25 | End: 2017-05-26 | Stop reason: HOSPADM

## 2017-05-25 RX ORDER — GLUCAGON 1 MG
1 KIT INJECTION
Status: DISCONTINUED | OUTPATIENT
Start: 2017-05-25 | End: 2017-05-26 | Stop reason: HOSPADM

## 2017-05-25 RX ORDER — ACETAMINOPHEN 325 MG/1
650 TABLET ORAL EVERY 6 HOURS PRN
Status: DISCONTINUED | OUTPATIENT
Start: 2017-05-25 | End: 2017-05-26 | Stop reason: HOSPADM

## 2017-05-25 RX ORDER — AMLODIPINE BESYLATE 10 MG/1
10 TABLET ORAL DAILY
Status: DISCONTINUED | OUTPATIENT
Start: 2017-05-26 | End: 2017-05-26 | Stop reason: HOSPADM

## 2017-05-25 RX ORDER — HYDROCHLOROTHIAZIDE 25 MG/1
25 TABLET ORAL DAILY
Status: DISCONTINUED | OUTPATIENT
Start: 2017-05-26 | End: 2017-05-26 | Stop reason: HOSPADM

## 2017-05-25 RX ORDER — PANTOPRAZOLE SODIUM 40 MG/1
40 TABLET, DELAYED RELEASE ORAL DAILY
Status: DISCONTINUED | OUTPATIENT
Start: 2017-05-26 | End: 2017-05-26 | Stop reason: HOSPADM

## 2017-05-25 RX ORDER — IBUPROFEN 200 MG
24 TABLET ORAL
Status: DISCONTINUED | OUTPATIENT
Start: 2017-05-25 | End: 2017-05-26 | Stop reason: HOSPADM

## 2017-05-25 RX ORDER — ONDANSETRON 2 MG/ML
4 INJECTION INTRAMUSCULAR; INTRAVENOUS EVERY 12 HOURS PRN
Status: DISCONTINUED | OUTPATIENT
Start: 2017-05-25 | End: 2017-05-26 | Stop reason: HOSPADM

## 2017-05-25 RX ORDER — ATORVASTATIN CALCIUM 20 MG/1
80 TABLET, FILM COATED ORAL DAILY
Status: DISCONTINUED | OUTPATIENT
Start: 2017-05-26 | End: 2017-05-26 | Stop reason: HOSPADM

## 2017-05-25 RX ADMIN — POTASSIUM BICARBONATE 50 MEQ: 25 TABLET, EFFERVESCENT ORAL at 11:05

## 2017-05-25 RX ADMIN — HYDROCODONE BITARTRATE AND ACETAMINOPHEN 1 TABLET: 5; 325 TABLET ORAL at 11:05

## 2017-05-25 RX ADMIN — IOHEXOL 75 ML: 350 INJECTION, SOLUTION INTRAVENOUS at 08:05

## 2017-05-25 RX ADMIN — MORPHINE SULFATE 4 MG: 2 INJECTION, SOLUTION INTRAMUSCULAR; INTRAVENOUS at 05:05

## 2017-05-25 RX ADMIN — ONDANSETRON 4 MG: 2 INJECTION INTRAMUSCULAR; INTRAVENOUS at 05:05

## 2017-05-25 NOTE — ED PROVIDER NOTES
"Encounter Date: 5/25/2017    SCRIBE #1 NOTE: I, Kavya Patino, am scribing for, and in the presence of,  Dr. Nowak. I have scribed the following portions of the note - the EKG reading.       History     Chief Complaint   Patient presents with    Chest Pain     Pt sent down from cardiology clinic-states had chest "squeezing" this am.  Sent down b/c of abnormal EKG.   Pt s/p toe surgery on Monday.      Review of patient's allergies indicates:  No Known Allergies  This is a 66 year old female with a PMH of HTN, SAH, reflex syncope, ?CAD (no outside records available), right carotid stent who presents to the ED as a referral from cardiology clinic for chest pain. Patient is post operative from an orthopedic surgery of the right foot on 5/20/17. She reports difficulty sleeping last night secondary to foot pain and tried the prescribed percocet, which induced nausea and vomiting. Around 0500 this morning she began with intermittent chest discomfort at rest, described as tightness or squeezing in nature. It is nonradiating. She denies associated SOB or diaphoresis. She had several episodes lasting seconds at a time but has not had any chest discomfort since this morning. She made an appointment in the clinic for evaluation and was found to have EKG changes, prompting her referral to the ED. She denies fever, chills, active chest pain, SOB, abdominal pain, or additional complaints.           Past Medical History:   Diagnosis Date    CAD (coronary artery disease)     Outside [facility] 6/2014: 20% mLAD, 50% mCx, 20%, mRCA    Carotid stenosis     R CCA stent, L CCA 60%    Dyslipidemia     ESBL (extended spectrum beta-lactamase) producing bacteria infection     UTI    Hypertension     SAH (subarachnoid hemorrhage)     1999, from ruptured aneurysm, s/p clip     Past Surgical History:   Procedure Laterality Date    anneurysm cerebral   1999    ANTERIOR CRUCIATE LIGAMENT REPAIR  2012     Family History   Problem Relation Age " of Onset    Aneurysm Mother     Alcohol abuse Father     Heart disease Brother      Social History   Substance Use Topics    Smoking status: Former Smoker     Quit date: 1/1/1999    Smokeless tobacco: Not on file    Alcohol use 4.2 oz/week     7 Glasses of wine per week     Review of Systems   Constitutional: Negative for chills and fever.   HENT: Negative for sore throat.    Respiratory: Negative for shortness of breath.    Cardiovascular: Positive for chest pain.   Gastrointestinal: Positive for nausea and vomiting.   Genitourinary: Negative for dysuria.   Musculoskeletal: Positive for arthralgias (post op pain of right foot). Negative for back pain.   Skin: Negative for rash.   Neurological: Negative for weakness.   Hematological: Does not bruise/bleed easily.       Physical Exam     Initial Vitals [05/25/17 1431]   BP Pulse Resp Temp SpO2   129/84 83 20 98.2 °F (36.8 °C) 95 %     Physical Exam    Constitutional: She appears well-developed and well-nourished.   HENT:   Head: Atraumatic.   Eyes: Conjunctivae and EOM are normal. Pupils are equal, round, and reactive to light.   Cardiovascular: Normal rate, regular rhythm and normal heart sounds.   Pulmonary/Chest: Breath sounds normal. No respiratory distress. She has no wheezes. She has no rhonchi. She has no rales. She exhibits no tenderness.   Abdominal: Soft. Bowel sounds are normal. There is no tenderness. There is no rigidity, no rebound and no guarding.   Musculoskeletal:   Right foot with dressing and post op shoe in place.   Neurological: She is alert and oriented to person, place, and time.   Skin: Skin is warm and dry. No rash noted.         ED Course   Procedures  Labs Reviewed   CBC W/ AUTO DIFFERENTIAL - Abnormal; Notable for the following:        Result Value    MCV 99 (*)     MCH 34.0 (*)     RDW 14.6 (*)     Mono # 1.8 (*)     All other components within normal limits   COMPREHENSIVE METABOLIC PANEL - Abnormal; Notable for the following:      Potassium 3.0 (*)     Glucose 118 (*)     eGFR if non  52.1 (*)     All other components within normal limits   TROPONIN I   B-TYPE NATRIURETIC PEPTIDE     Imaging Results          X-Ray Chest PA And Lateral (Final result)  Result time 05/25/17 18:10:52    Final result by Enrique Pino MD (05/25/17 18:10:52)                 Impression:        No evidence of acute intrathoracic disease.      Electronically signed by: ENRIQUE PINO MD  Date:     05/25/17  Time:    18:10              Narrative:    Technique:  Chest PA and lateral radiographs    Comparison: None.    Findings:     Lung volumes are normal and symmetric. No pleural fluid or pneumothorax. The mediastinal structures are midline. The cardiac silhouette is normal in size. The osseous structures demonstrate no acute abnormality. By carotid stent.                              EKG Readings: (Independently Interpreted)   Normal sinus rhythm at 85 with flat T-waves throughout. When compared to an EKG from earlier today at 1:40 PM where she had T-wave inversion in V3-V5, these have flattened out.           Medical Decision Making:   History:   Old Medical Records: I decided to obtain old medical records.  Independently Interpreted Test(s):   I have ordered and independently interpreted EKG Reading(s) - see prior notes  Clinical Tests:   Lab Tests: Ordered and Reviewed  Radiological Study: Ordered and Reviewed  Medical Tests: Ordered and Reviewed       APC / Resident Notes:   66 year old female presents for evaluation of chest pain following emesis.  On exam she is afebrile and nontoxic. Heart rate and rhythm are regular. Lungs are clear bilaterally. Abdomen is soft, nontender. Chest wall is nontender. Right foot with post op dressing in tact.    Will obtain cardiac workup and discuss with cardiology fellow.  Pain control with morphine and zofran.    Troponin < 0.006  Labs with K 3.0, oral replacement given in ED.  Will discuss with cardiology  and admit for ACS rule out.   I discussed the care of this patient with my supervising MD.        Salvatoreibe Attestation:   Scribe #1: I performed the above scribed service and the documentation accurately describes the services I performed. I attest to the accuracy of the note.    Attending Attestation:     Physician Attestation Statement for NP/PA:   I have conducted a face to face encounter with this patient in addition to the NP/PA, due to Medical Complexity    Other NP/PA Attestation Additions:    History of Present Illness: Patient complains of intermittent chest discomfort that began this when she awoke with foot pain and vomiting\. This was approximately 4 AM.  She recentlyhad surgery on her foot.  She was seen in cardiology clinic and they sent her here for evaluation    Medical Decision Making: EKG from earlier today showed T-wave inversions anteriorly.  Our EKG in the ED showed that these have almost normalized by flattening out.  She is not currently having chest pain.      When I walked into the room to pt had a pulse ox of 89-90%.  Although this could be related to the fact that she just received IV pain medication, I am concerned about a PE given that she recently had surgery on her foot.  I have ordered a PE study to evaluate.         Physician Attestation for Scribe:  Physician Attestation Statement for Scribe #1: I, Dr. Nowak, reviewed documentation, as scribed by Kavya Patino in my presence, and it is both accurate and complete.                 ED Course     Clinical Impression:   The encounter diagnosis was Chest pain, unspecified type.    Disposition:   Disposition: Admitted  Condition: Stable       KAVITA Amin  05/25/17 1857       Molly Walker MD  05/25/17 2008

## 2017-05-25 NOTE — TELEPHONE ENCOUNTER
----- Message from Delgado Mckeonr sent at 5/25/2017  3:23 PM CDT -----  Contact: Ramila/ Daughter/ [phone] cell  Pt's daughter would like to speak with Dr. Hernandez regarding medical records brought into the office in October of 2016.  The pt states that she was told by Dr. Hernandez to leave all the outside records with him and he would have them put into the Epic system.  The pt is in ER now and needs to have those records so that they can compare EKG test done while in [facility] around 07/2016.  The only other alternative is to try and contact the doctor [facility], but she needs to have these records today, if possible. Ms. Mcgrath would like to speak with someone in the office today, if possible.  Please call and advise.    Thank you

## 2017-05-25 NOTE — ED NOTES
Pt resting quietly on stretcher; remains on continuous cardiac and pulse ox monitoring with non-invasive blood pressure to cycle every 15 minutes.  VS stable; NRS noted. Pt reporting significant relief in foot pain and nausea since receiving medication; currently rating pain as a 5/10.  Bed locked in lowest position; side rails up and locked x 2; call light, bedside table, and personal belongings within reach.  Pt denies needs or complaints at this time.  Daughter remains at bedside; will continue to monitor pt.

## 2017-05-25 NOTE — CONSULTS
Emergency Room  Cardiology Consults      SUBJECTIVE:     Chief Complaint/Reason for Consult: Chest Pain    History of Present Illness:  Patient is a 66 y.o. female with hx of HLD, HTN, R CCA Stent for dissection during f/u angiogram in 2002 for cerebral angiogram clipping done in 1999, Nonobstructive CAD per [facility] in 2016 as part of syncope workup sent from Cardiology clinic for evaluation of chest pain with Ant/Lat TWI. Pt notes she had uncomplicated foot surgery on 5/22 as outpt. She awoke this AM around 4AM with pain in her foot and took some pain medication which made her nauseated then she had episode of emesis. About 15 minutes after emesis she noted 1/10 left sided chest pressure lasting about 20 sec and returning every 30 minutes for several hours so she decided to make a cardiology appt. No palps, SOB, dizziness. She had cardiologist in [facility] which she lived for several years, and also wanted to est care. She notes before foot surgery she was able to site see around [facility] and walk up steps w/o CP or SOB. Currently she is CP free but given we had no prior EKG and EKG in clinic showed Ant/Lat TWI she was sent to ED. Trop at 1716 negative and TWI less prominent.       (Not in a hospital admission)    Review of patient's allergies indicates:  No Known Allergies     Past Medical History:   Diagnosis Date    CAD (coronary artery disease)     Outside [facility] 6/2014: 20% mLAD, 50% mCx, 20%, mRCA    Carotid stenosis     R CCA stent, L CCA 60%    Dyslipidemia     ESBL (extended spectrum beta-lactamase) producing bacteria infection     UTI    Hypertension     SAH (subarachnoid hemorrhage)     1999, from ruptured aneurysm, s/p clip     Past Surgical History:   Procedure Laterality Date    anneurysm cerebral   1999    ANTERIOR CRUCIATE LIGAMENT REPAIR  2012     Family History   Problem Relation Age of Onset    Aneurysm Mother     Alcohol abuse Father     Heart disease Brother      Social History   Substance  Use Topics    Smoking status: Former Smoker     Quit date: 1/1/1999    Smokeless tobacco: Not on file    Alcohol use 4.2 oz/week     7 Glasses of wine per week       Review of Systems:  Constitutional: no fever or chills  Eyes: no visual changes  ENT: no nasal congestion or sore throat  Respiratory: no cough or shortness of breath  Cardiovascular: no chest pain or palpitations  Gastrointestinal: no nausea or vomiting, no abdominal pain or change in bowel habits  Genitourinary: no hematuria or dysuria  Integument/Breast: no rash or pruritis  Hematologic/Lymphatic: no easy bruising or lymphadenopathy  Musculoskeletal: no arthralgias or myalgias  Neurological: no seizures or tremors  Endocrine: no heat or cold intolerance    OBJECTIVE:     Vital Signs (Most Recent)  Temp: 98.2 °F (36.8 °C) (05/25/17 1431)  Pulse: 87 (05/25/17 1818)  Resp: (!) 21 (05/25/17 1818)  BP: 118/73 (05/25/17 1818)  SpO2: (!) 92 % (05/25/17 1818)    Physical Exam:  General appearance: WDWN in NAD  Neck: no JVD  Lungs:  CTAB  Heart: rrr, no m/r/g  Abdomen: SNTND, BS+  Extremities: no c/c/e  Pulses: 2+ and symmetric  Neurologic: AOx3.         EKG:  NSR with Ant/lat TWI concerning for ischemia    ASSESSMENT/PLAN:     Non Specific Chest Pain  - Pt's chest pain seems consistent with Acid reflux from emesis vs MSK from emesis, but given moderate risk for MACE over next six weeks based on HEART Score =5 she should be admitted for observation to Internal Medicine for serial EKG's and Troponin's q6hrs x2  - Would not treat as ACS at this time as EKG changes may be chronic,though they are now somewhat less prominent, and story doesn't sound typical for angina.   - If enzymes remain negative pt will likely need further risk stratification with SPECT. This can likely be done as an outpt if she has no further recurrence of chest pain or dynamic EKG changes  - Would check 2D Echo    Discussed with ED Staff

## 2017-05-25 NOTE — PROGRESS NOTES
Subjective:   Patient ID:  Jessica Floyd is a 66 y.o. female who presents for evaluation of Chest Pain (intermittent discomfort x 1 day)      HPI:   Jessica Floyd presents for evaluation of chest pain. She has a history of apparent nonobstructive CAD on a cath done @ a year ago but records unavailable. The patient had foot surgery 4 days ago and was vomiting last night after taking pain medication. She then began having intermittent squeezing left precordial chest discomfort that would last for a minute then recur every 30-40 minutes. No associated symptoms. She came to the office with her ECG revealing anterior T wave inversion ( no ECG for comparison). Jessica Floyd has hypertension with good control. .     Review of Systems   Constitution: Negative for weakness, malaise/fatigue, weight gain and weight loss.   HENT: Negative for headaches.    Eyes: Negative for blurred vision.   Cardiovascular: Negative for chest pain, claudication, cyanosis, dyspnea on exertion, irregular heartbeat, leg swelling, near-syncope, orthopnea, palpitations, paroxysmal nocturnal dyspnea and syncope.        S/P carotid stent for iatrogenic dissection   Respiratory: Negative for cough, shortness of breath and wheezing.    Musculoskeletal: Negative for falls and myalgias.   Gastrointestinal: Negative for abdominal pain, heartburn, nausea and vomiting.   Genitourinary: Negative for nocturia.   Neurological: Negative for brief paralysis, dizziness, focal weakness, numbness and paresthesias.        History of reflex syncope  S/P SAH   Psychiatric/Behavioral: Negative for altered mental status.       Current Outpatient Prescriptions   Medication Sig    amlodipine (NORVASC) 10 MG tablet Take 1 tablet (10 mg total) by mouth once daily.    aspirin (ECOTRIN) 81 MG EC tablet Take 1 tablet (81 mg total) by mouth once daily.    atorvastatin (LIPITOR) 80 MG tablet Take 1 tablet (80 mg total) by mouth once daily.    carvedilol (COREG) 12.5 MG tablet  "Take 1 tablet (12.5 mg total) by mouth 2 (two) times daily with meals.    hydrochlorothiazide (HYDRODIURIL) 25 MG tablet Take 1 tablet (25 mg total) by mouth once daily.    OXYCODONE HCL (OXYCODONE ORAL) Take by mouth daily as needed.    pantoprazole (PROTONIX) 40 MG tablet Take 40 mg by mouth once daily.     No current facility-administered medications for this visit.      Objective:   Physical Exam   Constitutional: She is oriented to person, place, and time. She appears well-developed. No distress.   /66 (BP Location: Left arm, Patient Position: Sitting, BP Method: Automatic)   Pulse 84   Ht 5' 4.5" (1.638 m)    HENT:   Head: Normocephalic.   Eyes: Conjunctivae are normal. Pupils are equal, round, and reactive to light. No scleral icterus.   Neck: Neck supple. No JVD present. No thyromegaly present.   Cardiovascular: Normal rate, regular rhythm, normal heart sounds and intact distal pulses.  PMI is not displaced.  Exam reveals no gallop and no friction rub.    No murmur heard.  Pulmonary/Chest: Effort normal and breath sounds normal. No respiratory distress. She has no wheezes. She has no rales.   Abdominal: Soft. She exhibits no distension. There is no splenomegaly or hepatomegaly. There is no tenderness.   Musculoskeletal: She exhibits no edema or tenderness.   In a wheelchair with right foot bandaged post op.   Neurological: She is alert and oriented to person, place, and time.   Skin: Skin is warm and dry. She is not diaphoretic.   Psychiatric: She has a normal mood and affect. Her behavior is normal.       Lab Results   Component Value Date     05/15/2017     05/15/2017    K 3.9 05/15/2017    K 3.9 05/15/2017     05/15/2017     05/15/2017    CO2 25 05/15/2017    CO2 25 05/15/2017    BUN 18 05/15/2017    BUN 18 05/15/2017    CREATININE 1.2 05/15/2017    CREATININE 1.2 05/15/2017    GLU 88 05/15/2017    GLU 88 05/15/2017    AST 23 05/15/2017    ALT 19 05/15/2017    ALBUMIN " 3.8 05/15/2017    PROT 7.3 05/15/2017    BILITOT 0.7 05/15/2017    WBC 6.92 05/15/2017    HGB 12.9 05/15/2017    HCT 38.6 05/15/2017     (H) 05/15/2017     05/15/2017    TSH 1.307 03/20/2017       Assessment:     1. Chest pain at rest : Atypical for myocardial ischemia but ECG changes   2. Coronary artery disease involving native coronary artery of native heart without angina pectoris : Nonobstructive by history   3. Essential hypertension : Good control.    4. Dyslipidemia :  on high intensity statin       Plan:     Jessica was seen today for chest pain.    Diagnoses and all orders for this visit:    Chest pain at rest  Will send to ED for further evaluation.  Coronary artery disease involving native coronary artery of native heart without angina pectoris    Essential hypertension  Continue current regimen    Dyslipidemia  Continue current regimen    Other orders  -     OXYCODONE HCL (OXYCODONE ORAL); Take by mouth daily as needed.

## 2017-05-25 NOTE — ED NOTES
EKG done in clinic at 1340, shown to Dr. Nowak.  States no STEMI.  Will repeat if pt starts having chest pain again.  Pt is pain free at present.

## 2017-05-25 NOTE — TELEPHONE ENCOUNTER
Left VM for pt's daughter, Mayela to call the office back. I checked within Evonne's file folder for the records and within the pt's chart under media; no records were found.

## 2017-05-25 NOTE — ED NOTES
LOC: The patient is awake, alert, and oriented to place, time, situation. Affect is appropriate.  Speech is appropriate and clear.     APPEARANCE: Patient states she is  Uncomfortably, r/t foot surgery that was done early this week.   No acute distress.  Patient is clean and well groomed.    SKIN: The skin is warm and dry; color consistent with ethnicity.  Patient has normal skin turgor and moist mucus membranes.  Skin intact; no breakdown or bruising noted.  Right foot is wrapped with surgical dressing and is in a foot boot.    MUSCULOSKELETAL: Patient moving upper and lower extremities without difficulty.  Denies weakness.  Right foot in surgical boot.     RESPIRATORY: Airway is open and patent. Respirations spontaneous, even, easy, and non-labored.  Patient has a normal effort and rate.  No accessory muscle use noted. Denies cough.     CARDIAC:  Normal rhythm and rate noted.  No peripheral edema noted. C/o chest tightness this am at home. No SOB noted.         ABDOMEN: Soft and non tender to palpation.  No distention noted.     NEUROLOGIC: Eyes open spontaneously.  Behavior appropriate to situation.  Follows commands; facial expression symmetrical.  Purposeful motor response noted; normal sensation in all extremities.

## 2017-05-25 NOTE — TELEPHONE ENCOUNTER
Reason for Disposition   Intermittent mild chest pain lasting a few seconds each time    Protocols used: ST CHEST PAIN-A-OH     Mayela is calling to report that Jessica is experiencing a mild intermittent squeezing feeling in her chest that is lasting a few seconds.  This first started this AM.  Home care advice given.  Mayela reports Jessica would like to be seen today.  Please contact Mayela at [phone] to advise.

## 2017-05-25 NOTE — PROVIDER PROGRESS NOTES - EMERGENCY DEPT.
Encounter Date: 5/25/2017    ED Physician Progress Notes       SCRIBE NOTE: I, Kavya Patino, am scribing for, and in the presence of,  Dr. Nowak.  Physician Statement: I, Dr. Nowak, personally performed the services described in this documentation as scribed by Kavya Patino in my presence, and it is both accurate and complete.      EKG - STEMI Decision  Initial Reading: No STEMI present (on EKG from clinic).

## 2017-05-25 NOTE — TELEPHONE ENCOUNTER
"Pt's daughter states that she is "very upset at how Qiananer is running things" I suggested that the Ramila speak with Aga, she said yes. Told pt to hold on will I contact Aga and pt hung up. Tried to call pt back and left VM. I also routed a message to Dr. Hernandez to inform him of this pt issue.   "

## 2017-05-25 NOTE — ED NOTES
Pt placed on continuous cardiac and pulse ox monitoring with non-invasive blood pressure to cycle every 15 minutes.  NSR noted; VS stable.  Pt complains of pain in the left foot at this time.  Bed locked in lowest position; side rails up and locked x 2; call light and personal belongings within reach; will continue to monitor pt.

## 2017-05-25 NOTE — TELEPHONE ENCOUNTER
----- Message from Delgado Mckeonr sent at 5/25/2017  3:23 PM CDT -----  Contact: Ramila/ Daughter/ [phone] cell  Pt's daughter would like to speak with Dr. Hernandez regarding medical records brought into the office in October of 2016.  The pt states that she was told by Dr. Hernandez to leave all the outside records with him and he would have them put into the Epic system.  The pt is in ER now and needs to have those records so that they can compare EKG test done while in [facility] around 07/2016.  The only other alternative is to try and contact the doctor [facility], but she needs to have these records today, if possible. Ms. Mcgrath would like to speak with someone in the office today, if possible.  Please call and advise.    Thank you

## 2017-05-26 ENCOUNTER — NURSE TRIAGE (OUTPATIENT)
Dept: ADMINISTRATIVE | Facility: CLINIC | Age: 67
End: 2017-05-26

## 2017-05-26 VITALS
WEIGHT: 158 LBS | RESPIRATION RATE: 18 BRPM | TEMPERATURE: 98 F | DIASTOLIC BLOOD PRESSURE: 71 MMHG | HEART RATE: 77 BPM | BODY MASS INDEX: 26.98 KG/M2 | SYSTOLIC BLOOD PRESSURE: 114 MMHG | OXYGEN SATURATION: 91 % | HEIGHT: 64 IN

## 2017-05-26 PROBLEM — E87.6 HYPOKALEMIA: Status: ACTIVE | Noted: 2017-05-26

## 2017-05-26 PROBLEM — E87.6 HYPOKALEMIA: Status: RESOLVED | Noted: 2017-05-26 | Resolved: 2017-05-26

## 2017-05-26 PROBLEM — R07.9 CHEST PAIN, RULE OUT ACUTE MYOCARDIAL INFARCTION: Status: RESOLVED | Noted: 2017-05-25 | Resolved: 2017-05-26

## 2017-05-26 PROBLEM — R11.2 NAUSEA AND VOMITING: Status: RESOLVED | Noted: 2017-05-26 | Resolved: 2017-05-26

## 2017-05-26 PROBLEM — R11.2 NAUSEA AND VOMITING: Status: ACTIVE | Noted: 2017-05-26

## 2017-05-26 LAB
ANION GAP SERPL CALC-SCNC: 10 MMOL/L
AORTIC VALVE REGURGITATION: NORMAL
BUN SERPL-MCNC: 16 MG/DL
CALCIUM SERPL-MCNC: 9.1 MG/DL
CHLORIDE SERPL-SCNC: 97 MMOL/L
CO2 SERPL-SCNC: 31 MMOL/L
CREAT SERPL-MCNC: 1 MG/DL
DIASTOLIC DYSFUNCTION: NO
EST. GFR  (AFRICAN AMERICAN): >60 ML/MIN/1.73 M^2
EST. GFR  (NON AFRICAN AMERICAN): 58.4 ML/MIN/1.73 M^2
ESTIMATED PA SYSTOLIC PRESSURE: 32.16
GLUCOSE SERPL-MCNC: 89 MG/DL
MAGNESIUM SERPL-MCNC: 1.8 MG/DL
MAGNESIUM SERPL-MCNC: 1.9 MG/DL
PHOSPHATE SERPL-MCNC: 3.7 MG/DL
PHOSPHATE SERPL-MCNC: 4.3 MG/DL
POTASSIUM SERPL-SCNC: 3.4 MMOL/L
POTASSIUM SERPL-SCNC: 4.1 MMOL/L
RETIRED EF AND QEF - SEE NOTES: 55 (ref 55–65)
SODIUM SERPL-SCNC: 138 MMOL/L
T4 FREE SERPL-MCNC: 1.14 NG/DL
TRICUSPID VALVE REGURGITATION: NORMAL
TROPONIN I SERPL DL<=0.01 NG/ML-MCNC: 0.01 NG/ML
TROPONIN I SERPL DL<=0.01 NG/ML-MCNC: 0.01 NG/ML
TROPONIN I SERPL DL<=0.01 NG/ML-MCNC: <0.006 NG/ML
TSH SERPL DL<=0.005 MIU/L-ACNC: 0.17 UIU/ML

## 2017-05-26 PROCEDURE — 25000003 PHARM REV CODE 250: Performed by: PHYSICIAN ASSISTANT

## 2017-05-26 PROCEDURE — 93306 TTE W/DOPPLER COMPLETE: CPT

## 2017-05-26 PROCEDURE — 84100 ASSAY OF PHOSPHORUS: CPT

## 2017-05-26 PROCEDURE — 80048 BASIC METABOLIC PNL TOTAL CA: CPT

## 2017-05-26 PROCEDURE — 84484 ASSAY OF TROPONIN QUANT: CPT

## 2017-05-26 PROCEDURE — 84132 ASSAY OF SERUM POTASSIUM: CPT

## 2017-05-26 PROCEDURE — 84484 ASSAY OF TROPONIN QUANT: CPT | Mod: 91

## 2017-05-26 PROCEDURE — 36415 COLL VENOUS BLD VENIPUNCTURE: CPT

## 2017-05-26 PROCEDURE — G0378 HOSPITAL OBSERVATION PER HR: HCPCS

## 2017-05-26 PROCEDURE — 99217 PR OBSERVATION CARE DISCHARGE: CPT | Mod: ,,, | Performed by: PHYSICIAN ASSISTANT

## 2017-05-26 PROCEDURE — 93306 TTE W/DOPPLER COMPLETE: CPT | Mod: 26,,, | Performed by: INTERNAL MEDICINE

## 2017-05-26 PROCEDURE — 83735 ASSAY OF MAGNESIUM: CPT

## 2017-05-26 RX ORDER — ONDANSETRON 4 MG/1
4 TABLET, ORALLY DISINTEGRATING ORAL EVERY 6 HOURS PRN
Qty: 10 TABLET | Refills: 0 | Status: SHIPPED | OUTPATIENT
Start: 2017-05-26 | End: 2017-11-21 | Stop reason: ALTCHOICE

## 2017-05-26 RX ORDER — POTASSIUM CHLORIDE 20 MEQ/1
40 TABLET, EXTENDED RELEASE ORAL ONCE
Status: COMPLETED | OUTPATIENT
Start: 2017-05-26 | End: 2017-05-26

## 2017-05-26 RX ADMIN — CARVEDILOL 12.5 MG: 12.5 TABLET, FILM COATED ORAL at 09:05

## 2017-05-26 RX ADMIN — CARVEDILOL 12.5 MG: 12.5 TABLET, FILM COATED ORAL at 05:05

## 2017-05-26 RX ADMIN — ATORVASTATIN CALCIUM 80 MG: 20 TABLET, FILM COATED ORAL at 09:05

## 2017-05-26 RX ADMIN — HYDROCHLOROTHIAZIDE 25 MG: 25 TABLET ORAL at 09:05

## 2017-05-26 RX ADMIN — ONDANSETRON 8 MG: 8 TABLET, ORALLY DISINTEGRATING ORAL at 05:05

## 2017-05-26 RX ADMIN — AMLODIPINE BESYLATE 10 MG: 10 TABLET ORAL at 09:05

## 2017-05-26 RX ADMIN — HYDROCODONE BITARTRATE AND ACETAMINOPHEN 1 TABLET: 5; 325 TABLET ORAL at 05:05

## 2017-05-26 RX ADMIN — ASPIRIN 81 MG: 81 TABLET, COATED ORAL at 09:05

## 2017-05-26 RX ADMIN — PANTOPRAZOLE SODIUM 40 MG: 40 TABLET, DELAYED RELEASE ORAL at 09:05

## 2017-05-26 RX ADMIN — HYDROCODONE BITARTRATE AND ACETAMINOPHEN 1 TABLET: 5; 325 TABLET ORAL at 03:05

## 2017-05-26 RX ADMIN — ONDANSETRON 8 MG: 8 TABLET, ORALLY DISINTEGRATING ORAL at 03:05

## 2017-05-26 RX ADMIN — POTASSIUM CHLORIDE 40 MEQ: 1500 TABLET, EXTENDED RELEASE ORAL at 09:05

## 2017-05-26 RX ADMIN — HYDROCODONE BITARTRATE AND ACETAMINOPHEN 1 TABLET: 5; 325 TABLET ORAL at 09:05

## 2017-05-26 NOTE — SUBJECTIVE & OBJECTIVE
Past Medical History:   Diagnosis Date    CAD (coronary artery disease)     Outside [facility] 6/2014: 20% mLAD, 50% mCx, 20%, mRCA    Carotid stenosis     R CCA stent, L CCA 60%    Dyslipidemia     ESBL (extended spectrum beta-lactamase) producing bacteria infection     UTI    Hypertension     SAH (subarachnoid hemorrhage)     1999, from ruptured aneurysm, s/p clip       Past Surgical History:   Procedure Laterality Date    anneurysm cerebral   1999    ANTERIOR CRUCIATE LIGAMENT REPAIR  2012       Review of patient's allergies indicates:  No Known Allergies    No current facility-administered medications on file prior to encounter.      Current Outpatient Prescriptions on File Prior to Encounter   Medication Sig    amlodipine (NORVASC) 10 MG tablet Take 1 tablet (10 mg total) by mouth once daily.    aspirin (ECOTRIN) 81 MG EC tablet Take 1 tablet (81 mg total) by mouth once daily.    atorvastatin (LIPITOR) 80 MG tablet Take 1 tablet (80 mg total) by mouth once daily.    carvedilol (COREG) 12.5 MG tablet Take 1 tablet (12.5 mg total) by mouth 2 (two) times daily with meals.    hydrochlorothiazide (HYDRODIURIL) 25 MG tablet Take 1 tablet (25 mg total) by mouth once daily.    OXYCODONE HCL (OXYCODONE ORAL) Take by mouth daily as needed.    pantoprazole (PROTONIX) 40 MG tablet Take 40 mg by mouth once daily.     Family History     Problem Relation (Age of Onset)    Alcohol abuse Father    Aneurysm Mother    Heart disease Brother        Social History Main Topics    Smoking status: Former Smoker     Quit date: 1/1/1999    Smokeless tobacco: Not on file    Alcohol use 4.2 oz/week     7 Glasses of wine per week    Drug use: No    Sexual activity: Not on file     Review of Systems   Constitutional: Positive for activity change. Negative for chills and fever.   Respiratory: Negative for cough, shortness of breath and wheezing.    Cardiovascular: Positive for chest pain. Negative for palpitations and leg  swelling.   Gastrointestinal: Positive for constipation, nausea and vomiting. Negative for abdominal pain.   Genitourinary: Negative for difficulty urinating and dysuria.   Musculoskeletal: Positive for arthralgias, gait problem and myalgias.   Skin: Positive for wound. Negative for rash.   Neurological: Positive for syncope. Negative for dizziness, tremors, speech difficulty, weakness, light-headedness and headaches.   Psychiatric/Behavioral: Negative for confusion and dysphoric mood.     Objective:     Vital Signs (Most Recent):  Temp: 98.4 °F (36.9 °C) (05/25/17 2100)  Pulse: 92 (05/25/17 2100)  Resp: 16 (05/25/17 2100)  BP: 124/74 (05/25/17 2100)  SpO2: 96 % (05/25/17 2100) Vital Signs (24h Range):  Temp:  [98.2 °F (36.8 °C)-98.4 °F (36.9 °C)] 98.4 °F (36.9 °C)  Pulse:  [83-92] 92  Resp:  [16-21] 16  SpO2:  [92 %-97 %] 96 %  BP: (102-152)/(66-84) 124/74     Weight: 71.7 kg (158 lb)  Body mass index is 27.12 kg/m².    Physical Exam   Constitutional: She is oriented to person, place, and time. She appears well-developed and well-nourished. No distress.   HENT:   Head: Normocephalic and atraumatic.   Eyes: EOM are normal. Pupils are equal, round, and reactive to light.   Neck: Normal range of motion. Neck supple.   Cardiovascular: Normal rate and regular rhythm.  Exam reveals no friction rub.    No murmur heard.  Pulmonary/Chest: Effort normal and breath sounds normal. No respiratory distress. She has no wheezes. She exhibits no tenderness.   Abdominal: Soft. Bowel sounds are normal. She exhibits no distension. There is no tenderness.   Musculoskeletal: Normal range of motion. She exhibits no edema, tenderness or deformity.   R foot in post op boot, N/V intact   Neurological: She is alert and oriented to person, place, and time. No cranial nerve deficit.   No focal deficits   Skin: Skin is warm and dry. Capillary refill takes less than 2 seconds.   Psychiatric: She has a normal mood and affect. Her speech is normal  and behavior is normal. Judgment and thought content normal. Cognition and memory are normal.   Nursing note and vitals reviewed.       Significant Labs:   CBC:   Recent Labs  Lab 05/25/17  1716   WBC 11.91   HGB 14.4   HCT 42.0        CMP:   Recent Labs  Lab 05/25/17  1716      K 3.0*   CL 96   CO2 28   *   BUN 14   CREATININE 1.1   CALCIUM 10.0   PROT 7.9   ALBUMIN 4.0   BILITOT 0.8   ALKPHOS 75   AST 36   ALT 33   ANIONGAP 13   EGFRNONAA 52.1*     Cardiac Markers:   Recent Labs  Lab 05/25/17  1716   BNP 75     Lipase: No results for input(s): LIPASE in the last 48 hours.  TSH:   Recent Labs  Lab 05/25/17  2318   TSH 0.169*     Recent Lab Results       05/25/17  2318 05/25/17  1716      Albumin  4.0     Alkaline Phosphatase  75     ALT  33     Anion Gap  13     AST  36     Baso #  0.04     Basophil%  0.3     Total Bilirubin  0.8  Comment:  For infants and newborns, interpretation of results should be based  on gestational age, weight and in agreement with clinical  observations.  Premature Infant recommended reference ranges:  Up to 24 hours.............<8.0 mg/dL  Up to 48 hours............<12.0 mg/dL  3-5 days..................<15.0 mg/dL  6-29 days.................<15.0 mg/dL       BNP  75  Comment:  Values of less than 100 pg/ml are consistent with non-CHF populations.     BUN, Bld  14     Calcium  10.0     Chloride  96     CO2  28     Creatinine  1.1     Differential Method  Automated     eGFR if African American  >60.0     eGFR if non   52.1  Comment:  Calculation used to obtain the estimated glomerular filtration  rate (eGFR) is the CKD-EPI equation. Since race is unknown   in our information system, the eGFR values for   -American and Non--American patients are given   for each creatinine result.  (A)     Eos #  0.0     Eosinophil%  0.2     Free T4 1.14      Glucose  118(H)     Gran #  7.4     Gran%  62.0     Hematocrit  42.0     Hemoglobin  14.4     Lymph  #  2.7     Lymph%  22.3     Magnesium 1.9      MCH  34.0(H)     MCHC  34.3     MCV  99(H)     Mono #  1.8(H)     Mono%  14.9     MPV  10.3     Phosphorus 4.3      Platelets  267     Potassium  3.0(L)     Total Protein  7.9     RBC  4.23     RDW  14.6(H)     Sodium  137     Troponin I 0.006  Comment:  The reference interval for Troponin I represents the 99th percentile   cutoff   for our facility and is consistent with 3rd generation assay   performance.   <0.006  Comment:  The reference interval for Troponin I represents the 99th percentile   cutoff   for our facility and is consistent with 3rd generation assay   performance.       TSH 0.169(L)      WBC  11.91         All pertinent labs within the past 24 hours have been reviewed.    Significant Imaging: CT: I have reviewed all pertinent results/findings within the past 24 hours and my personal findings are:  CTA: no acute findings  CXR: I have reviewed all pertinent results/findings within the past 24 hours and my personal findings are:  no acute findings  EKG: I have reviewed all pertinent results/findings within the past 24 hours and my personal findings are: TWI  I have reviewed all pertinent imaging results/findings within the past 24 hours.

## 2017-05-26 NOTE — HPI
66F with PMHx of HLD, HTN, R CCA stent for dissection during f/u angiogram in 2002 for cerebral angiogram clipping done in 1999, nonobstructive CAD per [facility] in 2016 as part of syncope workup presents from cardiology clinic for evaluation of chest pain with Ant/Lat TWI on EKG. Pt notes she had uncomplicated outpatient foot surgery on 5/22. She has been taking pain medications which have caused her to feel nauseated and have multiple episodes of emesis for the past few days. Her nausea has improved with zofran given in the ED and with PO phenergan that she got from a friend while at home. She awoke this AM around 4AM with pain in her foot and took some pain medication which made her nauseated with emesis. About 15 minutes after emesis she noted 1/10 left sided chest pressure/discomfort lasting about 20 sec and returning intermittently until about 12N today. She decided to make a cardiology appt out of precautionary measures. She denies any CP now and denies any palpitations, SOB, or dizziness. The patient is new to [facility] and does not have her past medical records from [facility] where her last cardiologist is located. Her EKG in clinic showed Ant/Lat TWI and she was sent to ED. On arrival to ED, trop at 1716 and 2318 negative and TWI less prominent. Patient was evaluated by cardiology in the ED. The denies any discomfort besides her usual aches and pain and her R foot pain. In the ED she was found to be hypoxic to 89% but she had just been given morphine, however, in setting of recent sx a CTA was ordered and was negative.

## 2017-05-26 NOTE — PROVIDER PROGRESS NOTES - EMERGENCY DEPT.
Encounter Date: 5/25/2017    ED Physician Progress Notes       SCRIBE NOTE: I, Amber Kc, am scribing for, and in the presence of,  Dr. Grimes.  Physician Statement: I, Dr. Grimes, personally performed the services described in this documentation as scribed by Amber Kc in my presence, and it is both accurate and complete.     Physician Note:   8:33 PM   Reviewed CTA. No sign of PE.

## 2017-05-26 NOTE — HOSPITAL COURSE
Pt admitted to observation for CP.  Cardiology consulted.  Pt's chest pain seems consistent with Acid reflux from emesis vs MSK from emesis.  Cardiology does not recommend treating as ACS at this time.  Enzymes negative x 4.  2D echo shows EF 55%.  Will arrange for need further risk stratification with SPECT as outpatient.  No further CP since admission.

## 2017-05-26 NOTE — ASSESSMENT & PLAN NOTE
CAD  S/p R CCA stent placement  - patient seen by cardiology in ED, reccs apprecaited: Pt's chest pain seems consistent with Acid reflux from emesis vs MSK from emesis  - In setting of moderate risk based on HEART Score =5, patient admitted for rule out  - troponin negative x 2, will trend one more  - EKG changes may be chronic, though they are now somewhat less prominent, and no previous EKGs for comparison available  - If enzymes remain negative pt will likely need further risk stratification with SPECT. This can be done as an outpt if she has no further recurrence of chest pain or dynamic EKG changes  - Pending 2D Echo  - monitor on tele

## 2017-05-26 NOTE — DISCHARGE SUMMARY
[facility] Hospital Medicine  Discharge Summary      Patient Name: Jessica Floyd  MRN: [id number]  Admission Date: 5/25/2017  Hospital Length of Stay: 1 days  Discharge Date and Time:  05/26/2017 5:19 PM  Attending Physician: Rafael Collazo MD   Discharging Provider: Charissa Ambrosio PA-C  Primary Care Provider: Jose Peacock MD  [facility] Medicine Team: [facility] HOSP MED F Charissa Ambrosio PA-C    HPI:   66F with PMHx of HLD, HTN, R CCA stent for dissection during f/u angiogram in 2002 for cerebral angiogram clipping done in 1999, nonobstructive CAD per [facility] in 2016 as part of syncope workup presents from cardiology clinic for evaluation of chest pain with Ant/Lat TWI on EKG. Pt notes she had uncomplicated outpatient foot surgery on 5/22. She has been taking pain medications which have caused her to feel nauseated and have multiple episodes of emesis for the past few days. Her nausea has improved with zofran given in the ED and with PO phenergan that she got from a friend while at home. She awoke this AM around 4AM with pain in her foot and took some pain medication which made her nauseated with emesis. About 15 minutes after emesis she noted 1/10 left sided chest pressure/discomfort lasting about 20 sec and returning intermittently until about 12N today. She decided to make a cardiology appt out of precautionary measures. She denies any CP now and denies any palpitations, SOB, or dizziness. The patient is new to [facility] and does not have her past medical records from [facility] where her last cardiologist is located. Her EKG in clinic showed Ant/Lat TWI and she was sent to ED. On arrival to ED, trop at 1716 and 2318 negative and TWI less prominent. Patient was evaluated by cardiology in the ED. The denies any discomfort besides her usual aches and pain and her R foot pain. In the ED she was found to be hypoxic to 89% but she had just been given morphine, however, in setting of recent sx a CTA was  ordered and was negative.     * No surgery found *      Indwelling Lines/Drains at time of discharge:   Lines/Drains/Airways          No matching active lines, drains, or airways        Hospital Course:   Pt admitted to observation for CP.  Cardiology consulted.  Pt's chest pain seems consistent with Acid reflux from emesis vs MSK from emesis.  Cardiology does not recommend treating as ACS at this time.  Enzymes negative x 4.  2D echo shows EF 55%.  Will arrange for need further risk stratification with SPECT as outpatient.  No further CP since admission.     Consults:   Consults         Status Ordering Provider     Inpatient consult to Cardiology  Once     Provider:  (Not yet assigned)    Completed MARGIE JAIME          Significant Diagnostic Studies: Labs: All labs within the past 24 hours have been reviewed    Pending Diagnostic Studies:     None        Final Active Diagnoses:    Diagnosis Date Noted POA    Gastroesophageal reflux disease without esophagitis [K21.9] 03/20/2017 Yes    HTN (hypertension) [I10] 08/24/2016 Yes      Problems Resolved During this Admission:    Diagnosis Date Noted Date Resolved POA    PRINCIPAL PROBLEM:  Chest pain, rule out acute myocardial infarction [R07.9] 05/25/2017 05/26/2017 Yes    Hypokalemia [E87.6] 05/26/2017 05/26/2017 Yes    Nausea and vomiting [R11.2] 05/26/2017 05/26/2017 Yes      No new Assessment & Plan notes have been filed under this hospital service since the last note was generated.  Service: Hospital Medicine      Discharged Condition: good    Disposition: Home or Self Care    Follow Up:  Follow-up Information     [facility] - Cardiology.    Specialty:  Cardiology  Contact information:  [address]  [facility] [address] [phone]  Additional information:  3rd floor               Patient Instructions:     Diet general   Order Specific Question Answer Comments   Additional restrictions: Cardiac (Low Na/Chol)      Activity as tolerated      Call MD for:  severe uncontrolled pain     Call MD for:  persistent dizziness, light-headedness, or visual disturbances     Call MD for:  increased confusion or weakness       Medications:  Reconciled Home Medications:   Current Discharge Medication List      CONTINUE these medications which have NOT CHANGED    Details   amlodipine (NORVASC) 10 MG tablet Take 1 tablet (10 mg total) by mouth once daily.  Qty: 90 tablet, Refills: 1      aspirin (ECOTRIN) 81 MG EC tablet Take 1 tablet (81 mg total) by mouth once daily.  Refills: 0      atorvastatin (LIPITOR) 80 MG tablet Take 1 tablet (80 mg total) by mouth once daily.  Qty: 30 tablet, Refills: 11      carvedilol (COREG) 12.5 MG tablet Take 1 tablet (12.5 mg total) by mouth 2 (two) times daily with meals.  Qty: 60 tablet, Refills: 11      hydrochlorothiazide (HYDRODIURIL) 25 MG tablet Take 1 tablet (25 mg total) by mouth once daily.  Qty: 30 tablet, Refills: 11      OXYCODONE HCL (OXYCODONE ORAL) Take by mouth daily as needed.      pantoprazole (PROTONIX) 40 MG tablet Take 40 mg by mouth once daily.           Time spent on the discharge of patient: >30 minutes    Charissa Ambrosio PA-C  Department of Hospital Medicine  [facility]

## 2017-05-26 NOTE — NURSING
Verbal/written dc orders given with instructions. Patient awaiting transport out with daughter at bedside

## 2017-05-27 NOTE — TELEPHONE ENCOUNTER
Reason for Disposition   Caller has medication question, adult has minor symptoms, caller declines triage, and triager answers question    Protocols used: ST MEDICATION QUESTION CALL-A-AH  IN ER AND D/C TODAY/ as prescribed zofran for nausea ans prescription sent to pharmacy @ [facility] [place] which is closed for the weekend    Would like med sent to [facility] [phone]    Left info on recorder of req pharmacy    Rut Smith RN

## 2017-06-01 NOTE — TELEPHONE ENCOUNTER
I don't remember getting any sort of records from her, but perhaps we did.  Do you remember sending anything to medical records to scan into her chart?  I can't find anything under the media tab, which is where it usually resides.

## 2017-08-04 ENCOUNTER — OFFICE VISIT (OUTPATIENT)
Dept: INTERNAL MEDICINE | Facility: CLINIC | Age: 67
End: 2017-08-04
Attending: FAMILY MEDICINE
Payer: COMMERCIAL

## 2017-08-04 VITALS
HEART RATE: 86 BPM | WEIGHT: 162.25 LBS | HEIGHT: 64 IN | DIASTOLIC BLOOD PRESSURE: 80 MMHG | SYSTOLIC BLOOD PRESSURE: 120 MMHG | BODY MASS INDEX: 27.7 KG/M2

## 2017-08-04 DIAGNOSIS — Z23 NEED FOR VACCINATION WITH COMBINED DIPHTHERIA-TETANUS-PERTUSSIS (DTAP): Primary | ICD-10-CM

## 2017-08-04 DIAGNOSIS — N18.30 CKD (CHRONIC KIDNEY DISEASE) STAGE 3, GFR 30-59 ML/MIN: ICD-10-CM

## 2017-08-04 DIAGNOSIS — I10 ESSENTIAL HYPERTENSION: ICD-10-CM

## 2017-08-04 DIAGNOSIS — I25.10 CORONARY ARTERY DISEASE INVOLVING NATIVE CORONARY ARTERY OF NATIVE HEART WITHOUT ANGINA PECTORIS: ICD-10-CM

## 2017-08-04 PROCEDURE — 1126F AMNT PAIN NOTED NONE PRSNT: CPT | Mod: S$GLB,,, | Performed by: FAMILY MEDICINE

## 2017-08-04 PROCEDURE — 90715 TDAP VACCINE 7 YRS/> IM: CPT | Mod: S$GLB,,, | Performed by: FAMILY MEDICINE

## 2017-08-04 PROCEDURE — 99999 PR PBB SHADOW E&M-EST. PATIENT-LVL III: CPT | Mod: PBBFAC,,, | Performed by: FAMILY MEDICINE

## 2017-08-04 PROCEDURE — 1159F MED LIST DOCD IN RCRD: CPT | Mod: S$GLB,,, | Performed by: FAMILY MEDICINE

## 2017-08-04 PROCEDURE — 90471 IMMUNIZATION ADMIN: CPT | Mod: S$GLB,,, | Performed by: FAMILY MEDICINE

## 2017-08-04 PROCEDURE — 99214 OFFICE O/P EST MOD 30 MIN: CPT | Mod: 25,S$GLB,, | Performed by: FAMILY MEDICINE

## 2017-08-04 PROCEDURE — 3008F BODY MASS INDEX DOCD: CPT | Mod: S$GLB,,, | Performed by: FAMILY MEDICINE

## 2017-08-04 NOTE — PROGRESS NOTES
Patient given TDAP IM in the LD. Patient tolerated well and Band-Aid was applied. Lot#[id number] Exp:4/27/2019. Patient advised to wait in the lobby for 15 min to make sure no adverse reactions occur. Patient states verbal understanding and has no further questions. Patient given VIS information sheet.

## 2017-09-06 RX ORDER — AMLODIPINE BESYLATE 10 MG/1
10 TABLET ORAL DAILY
Qty: 90 TABLET | Refills: 3 | Status: SHIPPED | OUTPATIENT
Start: 2017-09-06 | End: 2018-07-28 | Stop reason: SDUPTHER

## 2017-09-06 RX ORDER — PANTOPRAZOLE SODIUM 40 MG/1
40 TABLET, DELAYED RELEASE ORAL DAILY
Qty: 90 TABLET | Refills: 3 | Status: SHIPPED | OUTPATIENT
Start: 2017-09-06 | End: 2019-01-02 | Stop reason: SDUPTHER

## 2017-09-06 RX ORDER — HYDROCHLOROTHIAZIDE 25 MG/1
25 TABLET ORAL DAILY
Qty: 90 TABLET | Refills: 3 | Status: SHIPPED | OUTPATIENT
Start: 2017-09-06 | End: 2018-06-27

## 2017-09-06 RX ORDER — CARVEDILOL 12.5 MG/1
12.5 TABLET ORAL 2 TIMES DAILY WITH MEALS
Qty: 180 TABLET | Refills: 3 | Status: SHIPPED | OUTPATIENT
Start: 2017-09-06 | End: 2018-07-28 | Stop reason: SDUPTHER

## 2017-09-06 RX ORDER — ATORVASTATIN CALCIUM 80 MG/1
80 TABLET, FILM COATED ORAL DAILY
Qty: 90 TABLET | Refills: 3 | Status: SHIPPED | OUTPATIENT
Start: 2017-09-06 | End: 2018-11-28 | Stop reason: SDUPTHER

## 2017-09-06 NOTE — TELEPHONE ENCOUNTER
----- Message from Yovana Acuña sent at 9/6/2017  8:17 AM CDT -----  Contact: Pt called  Pt called, requesting all cardiac medication refilled today(90 day supply). She states she is completely out of medications. Ph [phone]. Pt was last seen by Dr. Serrano on 5/25/17. Pt of Dr. Uriarte and upcoming 10/11/17. [facility]. Thank you

## 2017-10-11 ENCOUNTER — OFFICE VISIT (OUTPATIENT)
Dept: CARDIOLOGY | Facility: CLINIC | Age: 67
End: 2017-10-11
Payer: COMMERCIAL

## 2017-10-11 VITALS
DIASTOLIC BLOOD PRESSURE: 83 MMHG | SYSTOLIC BLOOD PRESSURE: 128 MMHG | WEIGHT: 161.38 LBS | HEART RATE: 81 BPM | BODY MASS INDEX: 27.55 KG/M2 | HEIGHT: 64 IN

## 2017-10-11 DIAGNOSIS — Z87.891 HISTORY OF TOBACCO USE: ICD-10-CM

## 2017-10-11 DIAGNOSIS — I10 ESSENTIAL HYPERTENSION: ICD-10-CM

## 2017-10-11 DIAGNOSIS — E78.5 DYSLIPIDEMIA: ICD-10-CM

## 2017-10-11 DIAGNOSIS — I60.9 SAH (SUBARACHNOID HEMORRHAGE): ICD-10-CM

## 2017-10-11 DIAGNOSIS — R06.09 DYSPNEA ON EXERTION: ICD-10-CM

## 2017-10-11 DIAGNOSIS — I25.10 CORONARY ARTERY DISEASE INVOLVING NATIVE CORONARY ARTERY OF NATIVE HEART WITHOUT ANGINA PECTORIS: Primary | ICD-10-CM

## 2017-10-11 PROCEDURE — 99999 PR PBB SHADOW E&M-EST. PATIENT-LVL IV: CPT | Mod: PBBFAC,,, | Performed by: INTERNAL MEDICINE

## 2017-10-11 PROCEDURE — 99214 OFFICE O/P EST MOD 30 MIN: CPT | Mod: S$GLB,,, | Performed by: INTERNAL MEDICINE

## 2017-10-11 NOTE — PROGRESS NOTES
"  Cardiology Clinic Note  Reason for Visit: Establish care    HPI:   Ms. Floyd is a 66-yo female with HTN, DLD, non-obstructive CAD on outside cath in [facility], reflex syncope by tilt table, SAH  s/p clipping, s/p right carotid stent for complication (iatrogenic "rupture" per patient) during cerebral angiography.  She does have a history of reflex syncope, per patient, and has a tendency to have low BP moreso than high BP with a positive tilt table test in the past.  She says that when her BP sometimes goes low and she can get lightheaded, but she drinks V8 and this improves.  No hx DM.  Per outside records, last LDL 56, HDL 93, TGY 54 in 2016.  She had foot surgery in May and thereafter had a brief episode of chest pain with anterior TWI, but hypokalemic and had been vomiting.  She was observed overnight with negative troponins, TWI were dynamic and resolving. She is able to complete all ADL's/IADLs without chest pain, but does get some dyspnea with exertion with stairs or heavy exertion.  She denies palpitations, orthopnea, LE edema.     She is upset with her care at [facility], because she had sent over her records and they were never scanned.  Additionally, when she was sent to the ER with the TWI to observe if she was having a heart attack, she says that she had to wait in the emergency room for 4.5 hours before she was seen.  She has been stressed this year, her daughter gave birth recently to a premature baby.      Social:   Retired, worked as nutrition counselor for [employer]  History smoker, quit 17 years ago.  12 pack-year history.      Family History:   Mother -  of aortic aneurysm  Father - TB, smoker    ROS:    Constitution: Negative for fever or chills. Negative for weight loss or gain.   HENT: Negative for sore throat or headaches. Negative for rhinorrhea.  Eyes: Negative for blurred or double vision.   Cardiovascular: See above  Pulmonary: Negative for SOB. Negative for cough. "   Gastrointestinal: Negative for abdominal pain. Negative for nausea/ vomiting. Negative for diarrhea.   : Negative for dysuria.   Neurological: Negative for focal weakness or sensory changes.  PMH:     Past Medical History:   Diagnosis Date    CAD (coronary artery disease)     Outside [facility] 6/2014: 20% mLAD, 50% mCx, 20%, mRCA    Carotid stenosis     R CCA stent, L CCA 60%    Dyslipidemia     ESBL (extended spectrum beta-lactamase) producing bacteria infection     UTI    Hypertension     Nausea and vomiting 5/26/2017    SAH (subarachnoid hemorrhage)     1999, from ruptured aneurysm, s/p clip     Past Surgical History:   Procedure Laterality Date    anneurysm cerebral   1999    ANTERIOR CRUCIATE LIGAMENT REPAIR  2012     Allergies:   No Known Allergies  Medications:     Current Outpatient Prescriptions on File Prior to Visit   Medication Sig Dispense Refill    amlodipine (NORVASC) 10 MG tablet Take 1 tablet (10 mg total) by mouth once daily. 90 tablet 3    atorvastatin (LIPITOR) 80 MG tablet Take 1 tablet (80 mg total) by mouth once daily. 90 tablet 3    carvedilol (COREG) 12.5 MG tablet Take 1 tablet (12.5 mg total) by mouth 2 (two) times daily with meals. 180 tablet 3    hydrochlorothiazide (HYDRODIURIL) 25 MG tablet Take 1 tablet (25 mg total) by mouth once daily. 90 tablet 3    ondansetron (ZOFRAN-ODT) 4 MG TbDL Take 1 tablet (4 mg total) by mouth every 6 (six) hours as needed. 10 tablet 0    pantoprazole (PROTONIX) 40 MG tablet Take 1 tablet (40 mg total) by mouth once daily. 90 tablet 3    aspirin (ECOTRIN) 81 MG EC tablet Take 1 tablet (81 mg total) by mouth once daily.  0    [DISCONTINUED] OXYCODONE HCL (OXYCODONE ORAL) Take by mouth daily as needed.       No current facility-administered medications on file prior to visit.      Social History:     Social History   Substance Use Topics    Smoking status: Former Smoker     Quit date: 1/1/1999    Smokeless tobacco: Never Used    Alcohol use  "4.2 oz/week     7 Glasses of wine per week     Family History:     Family History   Problem Relation Age of Onset    Aneurysm Mother     Alcohol abuse Father     Heart disease Brother      Physical Exam:     /83 (BP Location: Left arm, Patient Position: Sitting, BP Method: Medium (Automatic))   Pulse 81   Ht 5' 3.5" (1.613 m)   Wt 73.2 kg (161 lb 6 oz)   BMI 28.14 kg/m²      Constitutional: NAD, conversant  HEENT: Sclera anicteric, EOMI, OP clear  Neck: No JVD, no carotid bruits  CV: RRR, no m/r/g, normal S1/S2  Pulm: CTAB, no wheezes, rales, or ronchi  GI: Abdomen soft, NTND, +BS  Extremities: No LE edema, warm with palpable pulses  Skin: No ecchymosis, erythema, or ulcers  Psych: AOx3, appropriate affect  Neuro: No focal deficits    Labs:     Lab Results   Component Value Date     05/26/2017    K 4.1 05/26/2017    CL 97 05/26/2017    CO2 31 (H) 05/26/2017    BUN 16 05/26/2017    CREATININE 1.0 05/26/2017    ANIONGAP 10 05/26/2017     Lab Results   Component Value Date    AST 36 05/25/2017    ALT 33 05/25/2017    ALKPHOS 75 05/25/2017    BILITOT 0.8 05/25/2017    ALBUMIN 4.0 05/25/2017     Lab Results   Component Value Date    CALCIUM 9.1 05/26/2017    MG 1.8 05/26/2017    PHOS 3.7 05/26/2017     Lab Results   Component Value Date    BNP 75 05/25/2017    Lab Results   Component Value Date    WBC 11.91 05/25/2017    HGB 14.4 05/25/2017    HCT 42.0 05/25/2017     05/25/2017    GRAN 7.4 05/25/2017    GRAN 62.0 05/25/2017     No results found for: PTT, INR  No results found for: CHOL, HDL, LDLCALC, TRIG  No results found for: HGBA1C  Lab Results   Component Value Date    TSH 0.169 (L) 05/25/2017        TTE 5/2017  1 - Concentric remodeling.     2 - Normal left ventricular systolic function (EF 55-60%).     3 - Normal right ventricular systolic function .     4 - Normal left ventricular diastolic function.     5 - The estimated PA systolic pressure is 32 mmHg.     6 - No wall motion " "abnormalities.     Assessment and Plan:   Ms. Floyd is a 66-yo female with HTN, DLD, non-obstructive CAD, reflex syncope by tilt table, SAH  s/p clipping, s/p right carotid stent for complication (iatrogenic "rupture" per patient) during cerebral angiography.  She is doing well today without complaint.    CAD  - Non-obstructive on outside cath, no anginal symptoms  - BP control, aspirin 81 mg, continue statin  - Repeat lipid panel as below    HTN  - BP controlled on current regimen of amlodipine, carvedilol, HCTZ    Hx SAH, smoking  - Will order screening ultrasound for AAA  - History of intracranial aneurysm, and mother  of ruptured abdominal aneurysm    DLD  - Last lipid panel per medical records is at goal  - Continue statin  - Will repeat lipid panel    Dyspnea on Exertion  - Dynamic TWI on EKG in May 2017, does have some dyspnea now  - Will get records from prior cath (these were never scanned)  - Treadmill stress SPECT to evaluate    Signed:  Rafael Uriarte MD  Cardiology Fellow, PGY-6  Pager: [phone]  10/11/2017 3:45 PM    Follow-up:     No future appointments.  "

## 2017-10-27 ENCOUNTER — CLINICAL SUPPORT (OUTPATIENT)
Dept: CARDIOLOGY | Facility: CLINIC | Age: 67
End: 2017-10-27
Payer: COMMERCIAL

## 2017-10-27 ENCOUNTER — LAB VISIT (OUTPATIENT)
Dept: LAB | Facility: HOSPITAL | Age: 67
End: 2017-10-27
Payer: COMMERCIAL

## 2017-10-27 DIAGNOSIS — E78.5 DYSLIPIDEMIA: ICD-10-CM

## 2017-10-27 DIAGNOSIS — Z87.891 HISTORY OF TOBACCO USE: ICD-10-CM

## 2017-10-27 DIAGNOSIS — I60.9 SAH (SUBARACHNOID HEMORRHAGE): ICD-10-CM

## 2017-10-27 DIAGNOSIS — I25.10 CORONARY ARTERY DISEASE INVOLVING NATIVE CORONARY ARTERY OF NATIVE HEART WITHOUT ANGINA PECTORIS: ICD-10-CM

## 2017-10-27 DIAGNOSIS — R06.09 DYSPNEA ON EXERTION: ICD-10-CM

## 2017-10-27 LAB
CHOLEST SERPL-MCNC: 218 MG/DL
CHOLEST/HDLC SERPL: 2.1 {RATIO}
HDLC SERPL-MCNC: 104 MG/DL
HDLC SERPL: 47.7 %
LDLC SERPL CALC-MCNC: 88.6 MG/DL
NONHDLC SERPL-MCNC: 114 MG/DL
TRIGL SERPL-MCNC: 127 MG/DL

## 2017-10-27 PROCEDURE — 93015 CV STRESS TEST SUPVJ I&R: CPT | Mod: S$GLB,,, | Performed by: INTERNAL MEDICINE

## 2017-10-27 PROCEDURE — 80061 LIPID PANEL: CPT

## 2017-10-27 PROCEDURE — 93978 VASCULAR STUDY: CPT | Mod: S$GLB,,, | Performed by: INTERNAL MEDICINE

## 2017-10-27 PROCEDURE — 78452 HT MUSCLE IMAGE SPECT MULT: CPT | Mod: S$GLB,,, | Performed by: INTERNAL MEDICINE

## 2017-10-27 PROCEDURE — 36415 COLL VENOUS BLD VENIPUNCTURE: CPT

## 2017-10-27 PROCEDURE — A9502 TC99M TETROFOSMIN: HCPCS | Mod: S$GLB,,, | Performed by: INTERNAL MEDICINE

## 2017-10-30 ENCOUNTER — TELEPHONE (OUTPATIENT)
Dept: CARDIOLOGY | Facility: CLINIC | Age: 67
End: 2017-10-30

## 2017-10-30 LAB — VASCULAR ABDOMINAL AORTIC ANEURYSM (AAA): 1.97

## 2017-10-30 NOTE — TELEPHONE ENCOUNTER
Called Ms. Floyd to inform her of her results.  Lipid panel is acceptable, HDL is high indicating cardioprotection.  Her AAA screening ultrasound shows no aneurysm, and SPECT shows no evidence of ischemia.  She expressed understanding, all questions were answered.      Rafael Uriarte MD  Cardiology Fellow

## 2017-11-21 ENCOUNTER — OFFICE VISIT (OUTPATIENT)
Dept: INTERNAL MEDICINE | Facility: CLINIC | Age: 67
End: 2017-11-21
Attending: FAMILY MEDICINE
Payer: COMMERCIAL

## 2017-11-21 VITALS
WEIGHT: 163.81 LBS | HEIGHT: 64 IN | DIASTOLIC BLOOD PRESSURE: 68 MMHG | BODY MASS INDEX: 27.96 KG/M2 | SYSTOLIC BLOOD PRESSURE: 120 MMHG | HEART RATE: 68 BPM

## 2017-11-21 DIAGNOSIS — Z23 NEEDS FLU SHOT: ICD-10-CM

## 2017-11-21 DIAGNOSIS — Z23 NEED FOR 23-POLYVALENT PNEUMOCOCCAL POLYSACCHARIDE VACCINE: ICD-10-CM

## 2017-11-21 DIAGNOSIS — I10 ESSENTIAL HYPERTENSION: Primary | ICD-10-CM

## 2017-11-21 PROCEDURE — 90732 PPSV23 VACC 2 YRS+ SUBQ/IM: CPT | Mod: S$GLB,,, | Performed by: FAMILY MEDICINE

## 2017-11-21 PROCEDURE — 99999 PR PBB SHADOW E&M-EST. PATIENT-LVL III: CPT | Mod: PBBFAC,,, | Performed by: FAMILY MEDICINE

## 2017-11-21 PROCEDURE — 90472 IMMUNIZATION ADMIN EACH ADD: CPT | Mod: S$GLB,,, | Performed by: FAMILY MEDICINE

## 2017-11-21 PROCEDURE — 90662 IIV NO PRSV INCREASED AG IM: CPT | Mod: S$GLB,,, | Performed by: FAMILY MEDICINE

## 2017-11-21 PROCEDURE — 90471 IMMUNIZATION ADMIN: CPT | Mod: S$GLB,,, | Performed by: FAMILY MEDICINE

## 2017-11-21 PROCEDURE — 99213 OFFICE O/P EST LOW 20 MIN: CPT | Mod: 25,S$GLB,, | Performed by: FAMILY MEDICINE

## 2017-11-21 NOTE — PROGRESS NOTES
Subjective:       Patient ID: Jessica Floyd is a 67 y.o. female.    Chief Complaint: Flu Vaccine and Immunizations (pneumonia )    Pt presents today for two vaccines due to birth of recent grandchild as well as travel to [place] at end of year  Pt otw doing well      Review of Systems   Constitutional: Negative.    Eyes: Negative.    Respiratory: Negative for cough, chest tightness and shortness of breath.    Cardiovascular: Negative for chest pain, palpitations and leg swelling.   Gastrointestinal: Negative.    Musculoskeletal: Negative.  Negative for joint swelling.   Skin: Negative.    Neurological: Negative for dizziness, weakness, light-headedness and headaches.       Objective:      Physical Exam   Constitutional: She is oriented to person, place, and time. She appears well-developed and well-nourished.   HENT:   Head: Normocephalic and atraumatic.   Eyes: Conjunctivae and EOM are normal. Pupils are equal, round, and reactive to light.   Neck: Normal range of motion. Neck supple. No thyromegaly present.   Cardiovascular: Normal rate, regular rhythm, normal heart sounds and intact distal pulses.    No murmur heard.  Pulmonary/Chest: Effort normal and breath sounds normal. No respiratory distress. She has no wheezes. She has no rales. She exhibits no tenderness.   Musculoskeletal: Normal range of motion. She exhibits no edema.   Lymphadenopathy:     She has no cervical adenopathy.   Neurological: She is alert and oriented to person, place, and time.   Skin: Skin is warm. No erythema.   Psychiatric: She has a normal mood and affect. Her behavior is normal. Judgment and thought content normal.       Assessment:       1. Need for 23-polyvalent pneumococcal polysaccharide vaccine    2. Needs flu shot        Plan:     Flu and Pneumo 23 given today  HTN: controlled on meds  Pt will RTC to re-establish care with me as her new PCP

## 2017-11-21 NOTE — PROGRESS NOTES
"Patient was given vaccine information sheet for the Flu Vaccine. The area of injection was palpated using the acromion process as a landmark. This area was cleaned with alcohol. Using a 25g 1" safety needle, 0.5mL of the vaccine was placed into the right deltoid muscle. The injection site was dressed with a bandage. Patient experienced no complications and was discharged in stable condition. Fluzone High Dose vaccine Lot: [id number] Exp: [id number]    Patient was given vaccine information sheet for the Azbxnvarn60 (pneumococcal polyvalent) vaccine. The area of injection was palpated using the acromion process as a landmark. This area was cleaned with alcohol. Using a 25g 1" safety needle, 0.5mL of the vaccine was placed into the left deltoid muscle. The injection site was dressed with a bandage. Patient experienced no complications and was discharged in stable condition. Pneumovax 23 (pneumococcal polyvalent) vaccine Lot: [id number] Exp: [id number]    "

## 2018-03-02 ENCOUNTER — OFFICE VISIT (OUTPATIENT)
Dept: URGENT CARE | Facility: CLINIC | Age: 68
End: 2018-03-02
Payer: COMMERCIAL

## 2018-03-02 VITALS
DIASTOLIC BLOOD PRESSURE: 90 MMHG | HEIGHT: 64 IN | SYSTOLIC BLOOD PRESSURE: 140 MMHG | BODY MASS INDEX: 27.14 KG/M2 | OXYGEN SATURATION: 96 % | WEIGHT: 159 LBS | HEART RATE: 91 BPM | RESPIRATION RATE: 19 BRPM

## 2018-03-02 DIAGNOSIS — S05.02XA ABRASION OF LEFT CORNEA, INITIAL ENCOUNTER: Primary | ICD-10-CM

## 2018-03-02 PROCEDURE — 3080F DIAST BP >= 90 MM HG: CPT | Mod: S$GLB,,, | Performed by: EMERGENCY MEDICINE

## 2018-03-02 PROCEDURE — 99214 OFFICE O/P EST MOD 30 MIN: CPT | Mod: S$GLB,,, | Performed by: EMERGENCY MEDICINE

## 2018-03-02 PROCEDURE — 3077F SYST BP >= 140 MM HG: CPT | Mod: S$GLB,,, | Performed by: EMERGENCY MEDICINE

## 2018-03-02 RX ORDER — POLYMYXIN B SULFATE AND TRIMETHOPRIM 1; 10000 MG/ML; [USP'U]/ML
SOLUTION OPHTHALMIC
Qty: 1 BOTTLE | Refills: 0 | Status: SHIPPED | OUTPATIENT
Start: 2018-03-02 | End: 2018-06-13 | Stop reason: ALTCHOICE

## 2018-03-03 NOTE — PROGRESS NOTES
"Subjective:       Patient ID: Jessica Floyd is a 67 y.o. female.    Vitals:  height is 5' 4" (1.626 m) and weight is 72.1 kg (159 lb). Her blood pressure is 159/97 (abnormal) and her pulse is 91. Her respiration is 19 and oxygen saturation is 96%.     Chief Complaint: Eye Problem    Patient with left eye pain and redness x a few days ago. Patient thinks she may have scratched her eye. Patient thought the eye was improving but the redness has increased. Patient stating she had an aneurysm 12 years ago and she is unable to see out of her left eye. She had amblyopia as a child and never had vision in her left eye. After the aneurysm it didn't change much. She can see light and dark out of the left eye.      Eye Problem    The left eye is affected. This is a new problem. The current episode started in the past 7 days. The problem occurs constantly. The problem has been gradually worsening. Associated symptoms include eye redness. Pertinent negatives include no blurred vision, fever, nausea, photophobia or vomiting.     Review of Systems   Constitution: Negative for chills and fever.   HENT: Negative for congestion.    Eyes: Positive for pain and redness. Negative for blurred vision and photophobia.   Gastrointestinal: Negative for nausea and vomiting.   Neurological: Negative for headaches.       Objective:      Physical Exam   Constitutional: She is oriented to person, place, and time. She appears well-developed and well-nourished.   HENT:   Head: Normocephalic and atraumatic.   Right Ear: External ear normal.   Left Ear: External ear normal.   Nose: Nose normal.   Mouth/Throat: Oropharynx is clear and moist.   Eyes: EOM and lids are normal. Pupils are equal, round, and reactive to light. No foreign body present in the left eye. Left conjunctiva is injected.   Fluorescein staining mild and around perimeter. No fb under lid No fb on cornea. Pt sees light and dark and shadows left eye   Neck: Trachea normal, full passive " range of motion without pain and phonation normal. Neck supple.   Musculoskeletal: Normal range of motion.   Neurological: She is alert and oriented to person, place, and time.   Skin: Skin is warm, dry and intact.   Psychiatric: She has a normal mood and affect. Her speech is normal and behavior is normal. Judgment and thought content normal. Cognition and memory are normal.   Nursing note and vitals reviewed.      Assessment:       1. Abrasion of left cornea, initial encounter        Plan:         Abrasion of left cornea, initial encounter  -     Ambulatory referral to Ophthalmology    Other orders  -     polymyxin B sulf-trimethoprim (POLYTRIM) 10,000 unit- 1 mg/mL Drop; One drop in affected eye(s) tid  Dispense: 1 Bottle; Refill: 0

## 2018-03-21 ENCOUNTER — TELEPHONE (OUTPATIENT)
Dept: OPHTHALMOLOGY | Facility: CLINIC | Age: 68
End: 2018-03-21

## 2018-03-23 NOTE — TELEPHONE ENCOUNTER
Spoke with pt and pt states she will call back next week. Pt verbally understood and had no further questions.

## 2018-06-13 ENCOUNTER — OFFICE VISIT (OUTPATIENT)
Dept: CARDIOLOGY | Facility: CLINIC | Age: 68
End: 2018-06-13
Payer: COMMERCIAL

## 2018-06-13 VITALS
BODY MASS INDEX: 27.25 KG/M2 | HEIGHT: 64 IN | WEIGHT: 159.63 LBS | DIASTOLIC BLOOD PRESSURE: 83 MMHG | HEART RATE: 70 BPM | SYSTOLIC BLOOD PRESSURE: 133 MMHG

## 2018-06-13 DIAGNOSIS — I10 ESSENTIAL HYPERTENSION: ICD-10-CM

## 2018-06-13 DIAGNOSIS — N18.30 CKD (CHRONIC KIDNEY DISEASE) STAGE 3, GFR 30-59 ML/MIN: ICD-10-CM

## 2018-06-13 DIAGNOSIS — I25.10 CORONARY ARTERY DISEASE INVOLVING NATIVE CORONARY ARTERY OF NATIVE HEART WITHOUT ANGINA PECTORIS: Primary | ICD-10-CM

## 2018-06-13 DIAGNOSIS — I60.9 SAH (SUBARACHNOID HEMORRHAGE): ICD-10-CM

## 2018-06-13 DIAGNOSIS — I65.23 BILATERAL CAROTID ARTERY STENOSIS: ICD-10-CM

## 2018-06-13 DIAGNOSIS — E78.5 DYSLIPIDEMIA: ICD-10-CM

## 2018-06-13 PROCEDURE — 99999 PR PBB SHADOW E&M-EST. PATIENT-LVL IV: CPT | Mod: PBBFAC,,, | Performed by: INTERNAL MEDICINE

## 2018-06-13 PROCEDURE — 99214 OFFICE O/P EST MOD 30 MIN: CPT | Mod: S$GLB,,, | Performed by: INTERNAL MEDICINE

## 2018-06-13 PROCEDURE — 99214 OFFICE O/P EST MOD 30 MIN: CPT | Mod: PBBFAC | Performed by: INTERNAL MEDICINE

## 2018-06-13 NOTE — PROGRESS NOTES
"  Cardiology Clinic Note  Reason for Visit: Follow up    HPI:   Ms. Floyd is a 67-yo female with HTN, DLD, non-obstructive CAD on outside cath in [facility], reflex syncope by tilt table, SAH  s/p clipping, s/p right carotid stent for complication (iatrogenic "rupture" per patient) during cerebral angiography.  She does have a history of reflex syncope, per patient, and has a tendency to have low BP moreso than high BP with a positive tilt table test in the past.  She says that when her BP sometimes goes low and she can get lightheaded, but she drinks V8 and this improves.  No hx DM.  Last lipid panel with elevated 's, LDL in 80's.  She is feeling well today, no complaints.  She takes care of her 10-month-old grandchild, travels frequently without difficulty.      She had foot surgery last May and thereafter had a brief episode of chest pain with anterior TWI, but hypokalemic and had been vomiting.  She was observed overnight with negative troponins, TWI were dynamic and resolving. Subsequent MPS showed no ischemia, albeit with low metabolic equivalent workload.  She is able to complete all ADL's/IADLs without chest pain or SAMPSON.  She denies palpitations, orthopnea, LE edema.     Social:    Retired, worked as nutrition counselor for [employer]  History smoker, quit 17 years ago.  12 pack-year history.      Family History:   Mother -  of aortic aneurysm  Father - TB, smoker    ROS:    Constitution: Negative for fever or chills. Negative for weight loss or gain.   HENT: Negative for sore throat or headaches. Negative for rhinorrhea.  Eyes: Negative for blurred or double vision.   Cardiovascular: See above  Pulmonary: Negative for SOB. Negative for cough.   Gastrointestinal: Negative for abdominal pain. Negative for nausea/ vomiting. Negative for diarrhea.   : Negative for dysuria.   Neurological: Negative for focal weakness or sensory changes.  PMH:     Past Medical History:   Diagnosis Date    CAD " (coronary artery disease)     Outside [facility] 6/2014: 20% mLAD, 50% mCx, 20%, mRCA    Carotid stenosis     R CCA stent, L CCA 60%    Dyslipidemia     ESBL (extended spectrum beta-lactamase) producing bacteria infection     UTI    Hypertension     Nausea and vomiting 5/26/2017    SAH (subarachnoid hemorrhage)     1999, from ruptured aneurysm, s/p clip     Past Surgical History:   Procedure Laterality Date    anneurysm cerebral   1999    ANTERIOR CRUCIATE LIGAMENT REPAIR  2012     Allergies:   No Known Allergies  Medications:     Current Outpatient Prescriptions on File Prior to Visit   Medication Sig Dispense Refill    amLODIPine (NORVASC) 10 MG tablet Take 1 tablet (10 mg total) by mouth once daily. 90 tablet 3    aspirin (ECOTRIN) 81 MG EC tablet Take 1 tablet (81 mg total) by mouth once daily.  0    atorvastatin (LIPITOR) 80 MG tablet Take 1 tablet (80 mg total) by mouth once daily. 90 tablet 3    carvedilol (COREG) 12.5 MG tablet Take 1 tablet (12.5 mg total) by mouth 2 (two) times daily with meals. 180 tablet 3    hydroCHLOROthiazide (HYDRODIURIL) 25 MG tablet Take 1 tablet (25 mg total) by mouth once daily. (Patient taking differently: Take 25 mg by mouth daily as needed. ) 90 tablet 3    pantoprazole (PROTONIX) 40 MG tablet Take 1 tablet (40 mg total) by mouth once daily. 90 tablet 3    [DISCONTINUED] acetaminophen-codeine 300-30mg (TYLENOL-CODEINE #3) 300-30 mg Tab take 1 to 2 tablets by mouth every 4 to 6 hours  as needed 20 tablet 0    [DISCONTINUED] cephALEXin (KEFLEX) 250 MG capsule take one capsule by mouth three times daily for one week, start one day prior to procedure 21 capsule 2    [DISCONTINUED] polymyxin B sulf-trimethoprim (POLYTRIM) 10,000 unit- 1 mg/mL Drop One drop in affected eye(s) tid 1 Bottle 0     No current facility-administered medications on file prior to visit.      Social History:     Social History   Substance Use Topics    Smoking status: Former Smoker     Quit date:  "1/1/1999    Smokeless tobacco: Never Used    Alcohol use 4.2 oz/week     7 Glasses of wine per week     Family History:     Family History   Problem Relation Age of Onset    Aneurysm Mother     Alcohol abuse Father     Heart disease Brother      Physical Exam:     /83 (BP Location: Left arm, Patient Position: Sitting, BP Method: X-Large (Automatic))   Pulse 70   Ht 5' 4" (1.626 m)   Wt 72.4 kg (159 lb 9.8 oz)   BMI 27.40 kg/m²      Constitutional: NAD, conversant  HEENT: Sclera anicteric, EOMI, OP clear  Neck: No carotid bruits  CV: RRR, no m/r/g  Pulm: CTAB, no wheezes, rales, or ronchi  GI: Abdomen soft, NTND, +BS  Extremities: No LE edema, warm with palpable pulses  Skin: No ecchymosis, erythema, or ulcers  Psych: AOx3, appropriate affect  Neuro: No focal deficits    Labs:     Lab Results   Component Value Date     05/26/2017    K 4.1 05/26/2017    CL 97 05/26/2017    CO2 31 (H) 05/26/2017    BUN 16 05/26/2017    CREATININE 1.0 05/26/2017    ANIONGAP 10 05/26/2017     Lab Results   Component Value Date    AST 36 05/25/2017    ALT 33 05/25/2017    ALKPHOS 75 05/25/2017    BILITOT 0.8 05/25/2017    ALBUMIN 4.0 05/25/2017     Lab Results   Component Value Date    CALCIUM 9.1 05/26/2017    MG 1.8 05/26/2017    PHOS 3.7 05/26/2017     Lab Results   Component Value Date    BNP 75 05/25/2017    Lab Results   Component Value Date    WBC 11.91 05/25/2017    HGB 14.4 05/25/2017    HCT 42.0 05/25/2017     05/25/2017    GRAN 7.4 05/25/2017    GRAN 62.0 05/25/2017     No results found for: PTT, INR  Lab Results   Component Value Date    CHOL 218 (H) 10/27/2017     (H) 10/27/2017    LDLCALC 88.6 10/27/2017    TRIG 127 10/27/2017     No results found for: HGBA1C  Lab Results   Component Value Date    TSH 0.169 (L) 05/25/2017        TTE 5/2017  1 - Concentric remodeling.     2 - Normal left ventricular systolic function (EF 55-60%).     3 - Normal right ventricular systolic function .     4 - " "Normal left ventricular diastolic function.     5 - The estimated PA systolic pressure is 32 mmHg.     6 - No wall motion abnormalities.     MPS   Impression: NORMAL MYOCARDIAL PERFUSION  1. The perfusion scan is free of evidence for myocardial ischemia or injury.   2. Resting wall motion is physiologic.   3. Visually estimated LV function is normal.   4. The ventricular volumes are normal at rest and stress.   5. The extracardiac distribution of radioactivity is normal.   6. There was no previous study available to compare.    Assessment and Plan:   Ms. Floyd is a 66-yo female with HTN, DLD, non-obstructive CAD, reflex syncope by tilt table, SAH  s/p clipping, s/p right carotid stent for complication (iatrogenic "rupture" per patient) during cerebral angiography.  She is doing well today without complaint.    CAD  - Non-obstructive on outside cath, no anginal symptoms, negative MPS  - Doing well, still traveling without difficulty  - BP control, aspirin 81 mg, continue statin  - Encouraged Mediterranean diet and exercise 30 minutes daily    HTN  - BP controlled on current regimen of amlodipine, carvedilol, HCTZ    Hx SAH, smoking  - No AAA on screening ultrasound 10/2017  - History of intracranial aneurysm, and mother  of ruptured abdominal aneurysm    DLD  - HDL is good (104!), LDL 88 at goal  - Continue statin    Follow up: 1 year    Signed:  Rafael Uriarte MD  Cardiology Fellow, PGY-6  Pager: [phone]  2018    Follow-up:     No future appointments.  "

## 2018-06-27 RX ORDER — HYDROCHLOROTHIAZIDE 25 MG/1
25 TABLET ORAL DAILY
Qty: 90 TABLET | Refills: 3 | Status: SHIPPED | OUTPATIENT
Start: 2018-06-27 | End: 2018-10-15 | Stop reason: ALTCHOICE

## 2018-06-28 RX ORDER — CARVEDILOL 12.5 MG/1
12.5 TABLET ORAL 2 TIMES DAILY WITH MEALS
Qty: 180 TABLET | Refills: 3 | OUTPATIENT
Start: 2018-06-28 | End: 2019-06-28

## 2018-06-28 RX ORDER — AMLODIPINE BESYLATE 10 MG/1
10 TABLET ORAL DAILY
Qty: 90 TABLET | Refills: 3 | OUTPATIENT
Start: 2018-06-28

## 2018-06-28 RX ORDER — ATORVASTATIN CALCIUM 80 MG/1
80 TABLET, FILM COATED ORAL DAILY
Qty: 90 TABLET | Refills: 3 | OUTPATIENT
Start: 2018-06-28

## 2018-06-28 RX ORDER — PANTOPRAZOLE SODIUM 40 MG/1
40 TABLET, DELAYED RELEASE ORAL DAILY
Qty: 90 TABLET | Refills: 3 | OUTPATIENT
Start: 2018-06-28

## 2018-07-05 ENCOUNTER — APPOINTMENT (RX ONLY)
Dept: URBAN - METROPOLITAN AREA CLINIC 98 | Facility: CLINIC | Age: 68
Setting detail: DERMATOLOGY
End: 2018-07-05

## 2018-07-05 DIAGNOSIS — L57.8 OTHER SKIN CHANGES DUE TO CHRONIC EXPOSURE TO NONIONIZING RADIATION: ICD-10-CM

## 2018-07-05 DIAGNOSIS — Z85.828 PERSONAL HISTORY OF OTHER MALIGNANT NEOPLASM OF SKIN: ICD-10-CM

## 2018-07-05 DIAGNOSIS — L81.4 OTHER MELANIN HYPERPIGMENTATION: ICD-10-CM

## 2018-07-05 DIAGNOSIS — L85.1 ACQUIRED KERATOSIS [KERATODERMA] PALMARIS ET PLANTARIS: ICD-10-CM

## 2018-07-05 DIAGNOSIS — H01.13 ECZEMATOUS DERMATITIS OF EYELID: ICD-10-CM

## 2018-07-05 DIAGNOSIS — L57.0 ACTINIC KERATOSIS: ICD-10-CM

## 2018-07-05 DIAGNOSIS — D485 NEOPLASM OF UNCERTAIN BEHAVIOR OF SKIN: ICD-10-CM

## 2018-07-05 PROBLEM — L85.3 XEROSIS CUTIS: Status: ACTIVE | Noted: 2018-07-05

## 2018-07-05 PROBLEM — H01.139 ECZEMATOUS DERMATITIS OF UNSPECIFIED EYE, UNSPECIFIED EYELID: Status: ACTIVE | Noted: 2018-07-05

## 2018-07-05 PROBLEM — D48.5 NEOPLASM OF UNCERTAIN BEHAVIOR OF SKIN: Status: ACTIVE | Noted: 2018-07-05

## 2018-07-05 PROBLEM — I10 ESSENTIAL (PRIMARY) HYPERTENSION: Status: ACTIVE | Noted: 2018-07-05

## 2018-07-05 PROCEDURE — ? BIOPSY BY SHAVE METHOD

## 2018-07-05 PROCEDURE — ? PRESCRIPTION

## 2018-07-05 PROCEDURE — ? COUNSELING

## 2018-07-05 PROCEDURE — ? TREATMENT REGIMEN

## 2018-07-05 PROCEDURE — 17003 DESTRUCT PREMALG LES 2-14: CPT

## 2018-07-05 PROCEDURE — 11100: CPT | Mod: 59

## 2018-07-05 PROCEDURE — ? OBSERVATION

## 2018-07-05 PROCEDURE — ? LIQUID NITROGEN

## 2018-07-05 PROCEDURE — ? SUNSCREEN RECOMMENDATIONS

## 2018-07-05 PROCEDURE — 99214 OFFICE O/P EST MOD 30 MIN: CPT | Mod: 25

## 2018-07-05 PROCEDURE — 17000 DESTRUCT PREMALG LESION: CPT

## 2018-07-05 RX ORDER — HYDROCORTISONE 25 MG/G
CREAM TOPICAL
Qty: 1 | Refills: 0 | Status: ERX | COMMUNITY
Start: 2018-07-05

## 2018-07-05 RX ORDER — UREA 40 G/100G
CREAM TOPICAL
Qty: 1 | Refills: 4 | Status: ERX | COMMUNITY
Start: 2018-07-05

## 2018-07-05 RX ADMIN — UREA: 40 CREAM TOPICAL at 20:21

## 2018-07-05 RX ADMIN — HYDROCORTISONE: 25 CREAM TOPICAL at 20:25

## 2018-07-05 ASSESSMENT — LOCATION DETAILED DESCRIPTION DERM
LOCATION DETAILED: NASAL DORSUM
LOCATION DETAILED: LEFT PROXIMAL POSTERIOR UPPER ARM
LOCATION DETAILED: RIGHT INFERIOR LATERAL NECK
LOCATION DETAILED: LEFT CENTRAL EYEBROW
LOCATION DETAILED: RIGHT SUPERIOR LATERAL UPPER BACK
LOCATION DETAILED: RIGHT INSTEP
LOCATION DETAILED: MIDDLE STERNUM
LOCATION DETAILED: LEFT LATERAL BUCCAL CHEEK
LOCATION DETAILED: RIGHT PROXIMAL POSTERIOR UPPER ARM
LOCATION DETAILED: LEFT SUPERIOR UPPER BACK
LOCATION DETAILED: RIGHT CENTRAL EYEBROW
LOCATION DETAILED: RIGHT INFERIOR CENTRAL MALAR CHEEK
LOCATION DETAILED: LEFT MEDIAL SUPERIOR CHEST
LOCATION DETAILED: LEFT LATERAL SUPERIOR CHEST
LOCATION DETAILED: LEFT MEDIAL PLANTAR HEEL
LOCATION DETAILED: RIGHT LATERAL MALAR CHEEK
LOCATION DETAILED: RIGHT ANTERIOR SHOULDER
LOCATION DETAILED: LEFT ANTERIOR SHOULDER
LOCATION DETAILED: LEFT INFERIOR LATERAL NECK
LOCATION DETAILED: LEFT CENTRAL MALAR CHEEK

## 2018-07-05 ASSESSMENT — LOCATION SIMPLE DESCRIPTION DERM
LOCATION SIMPLE: RIGHT ANTERIOR NECK
LOCATION SIMPLE: RIGHT PLANTAR SURFACE
LOCATION SIMPLE: LEFT UPPER BACK
LOCATION SIMPLE: RIGHT SHOULDER
LOCATION SIMPLE: NOSE
LOCATION SIMPLE: LEFT EYEBROW
LOCATION SIMPLE: LEFT CHEEK
LOCATION SIMPLE: LEFT PLANTAR SURFACE
LOCATION SIMPLE: LEFT SHOULDER
LOCATION SIMPLE: RIGHT POSTERIOR UPPER ARM
LOCATION SIMPLE: LEFT POSTERIOR UPPER ARM
LOCATION SIMPLE: LEFT ANTERIOR NECK
LOCATION SIMPLE: RIGHT CHEEK
LOCATION SIMPLE: RIGHT BACK
LOCATION SIMPLE: RIGHT EYEBROW
LOCATION SIMPLE: CHEST

## 2018-07-05 ASSESSMENT — LOCATION ZONE DERM
LOCATION ZONE: FACE
LOCATION ZONE: NOSE
LOCATION ZONE: ARM
LOCATION ZONE: TRUNK
LOCATION ZONE: NECK
LOCATION ZONE: FEET

## 2018-07-05 NOTE — PROCEDURE: BIOPSY BY SHAVE METHOD
Type Of Destruction Used: Curettage
Additional Anesthesia Volume In Cc (Will Not Render If 0): 0
Biopsy Method: Dermablade
Destruction After The Procedure: No
Lab Facility: [id number]
Biopsy Type: H and E
Body Location Override (Optional - Billing Will Still Be Based On Selected Body Map Location If Applicable): left superior cheek
Detail Level: Simple
Notification Instructions: Patient will be notified of biopsy results. However, patient instructed to call the office if not contacted within 2 weeks.
Render Post-Care Instructions In Note?: yes
Post-Care Instructions: 1) Gently wash the biopsy site with regular soap and water daily. If a scab develops, you can dilute hydrogen peroxide 1:1 with tap water and apply it to dissolve the crust.\\n2) Keep a small amount of white petrolatum (Aquaphor) and a non­stick bandage on the dressing at all times. Antibiotic ointments (Neosporin, etc) have not shown any superiority to simple petrolatum, cost more, and run the risk of a contact allergy. Keeping the wound covered has been shown to be superior to \"airing it out.\" If the bandage is irritating you, let us know and we can find a less­irritating alternative dressing together.\\n3) Notify the office if significant, persistent bleeding occurs from the biopsy site.\\n4) Do not expose the wound to fresh, salty, or brackish water until it has completely healed (after about 2 weeks). Tap or chlorinated water should be fine.\\n5) A small rim of redness and a small amount of yellow debris on the wound bed are normal. If you encounter increasing warmth, redness, pain, or liquid pus after the first day, these might indicate a localized infection and you should notify our office via phone or email.\\n6) If regular bandaids are uncomfortable or irritating, then oval hydrocolloid dressings (often sold as \"blister pads\") can be cut to fit and placed on the wound after the first 3­4 days, and changed out every 3­4 days. It is ok to wear these dressings in the shower or pool.\\n7) If stitches have been placed, you will need to return to the clinic in 1 or 2 weeks to have them professionally removed. Cutting the knots yourself may leave irritating portions of suture material under the skin.\\n8) Do not assume that \"no news is good news.\" If you have not received a call from our office within 7 days, please let us know so that we can investigate what might be holding up the biopsy report.\\n9) Wound care should continue until the biopsy site is pink, smooth, and dry with new skin, usually by 2 weeks.
Dressing: Band-Aid
Billing Type: Third-Party Bill
Lab: [id number]
Curettage Text: The wound bed was treated with curettage after the biopsy was performed.
Electrodesiccation And Curettage Text: The wound bed was treated with electrodesiccation and curettage after the biopsy was performed.
Hemostasis: Caprice's
Depth Of Biopsy: dermis
Electrodesiccation Text: The wound bed was treated with electrodesiccation after the biopsy was performed.
Anesthesia Type: 1% lidocaine without epinephrine
Wound Care: Petrolatum
Silver Nitrate Text: The wound bed was treated with silver nitrate after the biopsy was performed.
Cryotherapy Text: The wound bed was treated with cryotherapy after the biopsy was performed.
Anesthesia Volume In Cc (Will Not Render If 0): 0.6
Consent: Verbal consent was obtained and risks were reviewed including but not limited to scarring, infection, bleeding, scabbing, incomplete removal, nerve damage and allergy to anesthesia.

## 2018-07-05 NOTE — PROCEDURE: LIQUID NITROGEN
Consent: The patient's consent was obtained including but not limited to risks of crusting, scabbing, blistering, scarring, darker or lighter pigmentary change, recurrence, incomplete removal and infection.
Render Post-Care Instructions In Note?: yes
Duration Of Freeze Thaw-Cycle (Seconds): 4
Detail Level: Detailed
Post-Care Instructions: LIQUID NITROGEN TREATMENT Patient Information\\nLiquid Nitrogen is a very cold, liquefied gas with a temperature of 320 degrees below zero. It is used to freeze and destroy a variety of skin lesions such as keratoses and warts. It burns when applied and sometimes for several minutes thereafter. There are certain expectations that you should have following treatment:\\n1. The skin tends to become red and swollen within hours of treatment. In many patients, true blisters occur and are especially common around the fingers and eyelids. A scab then forms which will sometimes remain for 1 ­3 weeks and drop off. The lesion treated will often drop off at that point as well.\\n2. If you get a large or tender blister, you may gently prick the blister with a \"sterilized\" (i.e. soaked in alcohol) needle and allow the blister fluid to drain, but leave the blister roof intact. If the blister isn't bothering you, then it's best to leave it alone.\\n3. Clean the treatment site with soap and water once or twice daily. \\n4. We recommend applying petrolatum (Vaseline, Aquaphor) several times daily to the treated areas for the next 1 to 2 weeks. This will speed up healing, decrease pain, and improve the appearance of any scar that may form. We do not recommend antibiotic ointment (Bacitracin or Polysporin) as this has no superiority to pertolatum and may cause an allergic reaction. If the treated area is in a body­fold (arm pit, groin) then zinc oxide paste (Desitin ointment, A&D ointment, Timothy's Butt Paste) may be preferable. A Band­Aid is not necessary unless you find that the area is very weepy. \\n5. In general, you may participate without restriction in your daily activities including sports, swimming, and showering. We do not however recommend getting any wound in fresh or salt water.\\n6. If you have any questions, please contact our office.\\nIMPORTANT: Lesions treated with liquid nitrogen should resolve completely. If a lesion persists beyond 6 weeks we advise you to return to the clinic immediately for re­evaluation. (The  is instructed to get you in immediately.) Warts and Benign Keratoses may require multiple treatment sessions to clear the lesions. Commonly you can develop a white blemish or scar at the treatment site.

## 2018-07-05 NOTE — PROCEDURE: TREATMENT REGIMEN
Initiate Treatment: HC 2.5% cream twice daily
Detail Level: Zone
Initiate Treatment: Urea 40% cream twice daily
Otc Regimen: If Rx too expensive, try CeraVe SA or AmLactin crm BID to affected foot

## 2018-07-28 ENCOUNTER — NURSE TRIAGE (OUTPATIENT)
Dept: ADMINISTRATIVE | Facility: CLINIC | Age: 68
End: 2018-07-28

## 2018-07-28 RX ORDER — AMLODIPINE BESYLATE 10 MG/1
10 TABLET ORAL DAILY
Qty: 90 TABLET | Refills: 3 | Status: SHIPPED | OUTPATIENT
Start: 2018-07-28 | End: 2019-07-23 | Stop reason: SDUPTHER

## 2018-07-28 RX ORDER — CARVEDILOL 12.5 MG/1
12.5 TABLET ORAL 2 TIMES DAILY WITH MEALS
Qty: 180 TABLET | Refills: 3 | Status: SHIPPED | OUTPATIENT
Start: 2018-07-28 | End: 2019-06-03 | Stop reason: SDUPTHER

## 2018-07-28 NOTE — TELEPHONE ENCOUNTER
"    Reason for Disposition   [1] Request for URGENT new prescription or refill of "essential" medication (i.e., likelihood of harm to patient if not taken) AND [2] triager unable to fill per unit policy    Protocols used: ST MEDICATION QUESTION CALL-A-    Pt calling requesting refill for medications (Norvasc and Coreg).  Pt states she has no medications left.  Called and discussed with On Call (CARLOS Hyman MD); MD approved refill.  Medication reorder.  Pt called and notified.  Pt verbalized understanding.  "

## 2018-09-23 ENCOUNTER — OFFICE VISIT (OUTPATIENT)
Dept: URGENT CARE | Facility: CLINIC | Age: 68
End: 2018-09-23
Payer: COMMERCIAL

## 2018-09-23 VITALS
RESPIRATION RATE: 18 BRPM | TEMPERATURE: 98 F | DIASTOLIC BLOOD PRESSURE: 86 MMHG | OXYGEN SATURATION: 96 % | HEART RATE: 78 BPM | SYSTOLIC BLOOD PRESSURE: 131 MMHG

## 2018-09-23 DIAGNOSIS — J40 BRONCHITIS: Primary | ICD-10-CM

## 2018-09-23 DIAGNOSIS — R05.9 COUGH: ICD-10-CM

## 2018-09-23 LAB
CTP QC/QA: YES
FLUAV AG NPH QL: NEGATIVE
FLUBV AG NPH QL: NEGATIVE

## 2018-09-23 PROCEDURE — 87804 INFLUENZA ASSAY W/OPTIC: CPT | Mod: QW,S$GLB,, | Performed by: NURSE PRACTITIONER

## 2018-09-23 PROCEDURE — 99214 OFFICE O/P EST MOD 30 MIN: CPT | Mod: S$GLB,,, | Performed by: NURSE PRACTITIONER

## 2018-09-23 RX ORDER — BENZONATATE 200 MG/1
200 CAPSULE ORAL 3 TIMES DAILY PRN
Qty: 30 CAPSULE | Refills: 0 | Status: SHIPPED | OUTPATIENT
Start: 2018-09-23 | End: 2018-10-15 | Stop reason: ALTCHOICE

## 2018-09-23 RX ORDER — AZITHROMYCIN 250 MG/1
TABLET, FILM COATED ORAL
Qty: 6 TABLET | Refills: 0 | Status: SHIPPED | OUTPATIENT
Start: 2018-09-23 | End: 2018-11-28 | Stop reason: ALTCHOICE

## 2018-09-23 NOTE — PATIENT INSTRUCTIONS
Acute Bronchitis  Your healthcare provider has told you that you have acute bronchitis. Bronchitis is infection or inflammation of the bronchial tubes (airways in the lungs). Normally, air moves easily in and out of the airways. Bronchitis narrows the airways, making it harder for air to flow in and out of the lungs. This causes symptoms such as shortness of breath, coughing up yellow or green mucus, and wheezing. Bronchitis can be acute or chronic. Acute means the condition comes on quickly and goes away in a short time, usually within 3 to 10 days. Chronic means a condition lasts a long time and often comes back.    What causes acute bronchitis?  Acute bronchitis almost always starts as a viral respiratory infection, such as a cold or the flu. Certain factors make it more likely for a cold or flu to turn into bronchitis. These include being very young, being elderly, having a heart or lung problem, or having a weak immune system. Cigarette smoking also makes bronchitis more likely.  When bronchitis develops, the airways become swollen. The airways may also become infected with bacteria. This is known as a secondary infection.  Diagnosing acute bronchitis  Your healthcare provider will examine you and ask about your symptoms and health history. You may also have a sputum culture to test the fluid in your lungs. Chest X-rays may be done to look for infection in the lungs.  Treating acute bronchitis  Bronchitis usually clears up as the cold or flu goes away. You can help feel better faster by doing the following:  · Take medicine as directed. You may be told to take ibuprofen or other over-the-counter medicines. These help relieve inflammation in your bronchial tubes. Your healthcare provider may prescribe an inhaler to help open up the bronchial tubes. Most of the time, acute bronchitis is caused by a viral infection. Antibiotics are usually not prescribed for viral infections.  · Drink plenty of fluids, such as  water, juice, or warm soup. Fluids loosen mucus so that you can cough it up. This helps you breathe more easily. Fluids also prevent dehydration.  · Make sure you get plenty of rest.  · Do not smoke. Do not allow anyone else to smoke in your home.  Recovery and follow-up  Follow up with your doctor as you are told. You will likely feel better in a week or two. But a dry cough can linger beyond that time. Let your doctor know if you still have symptoms (other than a dry cough) after 2 weeks, or if youre prone to getting bronchial infections. Take steps to protect yourself from future infections. These steps include stopping smoking and avoiding tobacco smoke, washing your hands often, and getting a yearly flu shot.  When to call your healthcare provider  Call the healthcare provider if you have any of the following:  · Fever of 100.4°F (38.0°C) or higher, or as advised  · Symptoms that get worse, or new symptoms  · Trouble breathing  · Symptoms that dont start to improve within a week, or within 3 days of taking antibiotics   Date Last Reviewed: 12/1/2016  © 3924-1786 [employer]. [address] [place] [id number]. All rights reserved. This information is not intended as a substitute for professional medical care. Always follow your healthcare professional's instructions.    Please arrange follow up with your primary medical clinic as soon as possible. You must understand that you've received an Urgent Care treatment only and that you may be released before all of your medical problems are known or treated. You, the patient, will arrange for follow up as instructed. If your symptoms worsen or fail to improve you should go to the Emergency Room.

## 2018-09-23 NOTE — PROGRESS NOTES
Subjective:       Patient ID: Jessica Floyd is a 68 y.o. female.    Vitals:  oral temperature is 98.3 °F (36.8 °C). Her blood pressure is 131/86 and her pulse is 78. Her respiration is 18 and oxygen saturation is 96%.     Chief Complaint: Cough    Patient presents with sore throat, cough, and fever x 1 day. Sxs started yesterday. Patient notes temperature of 101.5 F. Patient states her  is having a bypass tomorrow and her granddaughter is also having surgery. Patient in need of antibiotics to knock illness out. Denies exp to flu or strep.       Cough   This is a new problem. The current episode started yesterday. The problem has been unchanged. The problem occurs constantly. The cough is productive of sputum. Associated symptoms include chills, a fever, myalgias and a sore throat. Pertinent negatives include no chest pain, ear pain, eye redness, headaches, shortness of breath or wheezing. Nothing aggravates the symptoms. She has tried nothing for the symptoms. The treatment provided no relief.     Review of Systems   Constitution: Positive for chills, fever and malaise/fatigue.   HENT: Positive for congestion, hoarse voice and sore throat. Negative for ear pain.    Eyes: Negative for discharge and redness.   Cardiovascular: Negative for chest pain, dyspnea on exertion and leg swelling.   Respiratory: Positive for cough. Negative for shortness of breath, sputum production and wheezing.    Musculoskeletal: Positive for myalgias.   Gastrointestinal: Negative for abdominal pain and nausea.   Neurological: Negative for headaches.   All other systems reviewed and are negative.      Objective:      Physical Exam   Constitutional: She is oriented to person, place, and time. She appears well-developed and well-nourished. She is cooperative.  Non-toxic appearance. She does not appear ill. No distress.   HENT:   Head: Normocephalic and atraumatic.   Right Ear: Hearing, tympanic membrane, external ear and ear canal  normal.   Left Ear: Hearing, tympanic membrane, external ear and ear canal normal.   Nose: Nose normal. No mucosal edema, rhinorrhea or nasal deformity. No epistaxis. Right sinus exhibits no maxillary sinus tenderness and no frontal sinus tenderness. Left sinus exhibits no maxillary sinus tenderness and no frontal sinus tenderness.   Mouth/Throat: Uvula is midline, oropharynx is clear and moist and mucous membranes are normal. No trismus in the jaw. Normal dentition. No uvula swelling. No posterior oropharyngeal erythema.   Eyes: Conjunctivae and lids are normal. No scleral icterus.   Sclera clear bilat   Neck: Trachea normal, full passive range of motion without pain and phonation normal. Neck supple.   Cardiovascular: Normal rate, regular rhythm, normal heart sounds, intact distal pulses and normal pulses.   Pulmonary/Chest: Effort normal. No respiratory distress. She has decreased breath sounds.   Abdominal: Soft. Normal appearance and bowel sounds are normal. She exhibits no distension. There is no tenderness.   Musculoskeletal: Normal range of motion. She exhibits no edema or deformity.   Lymphadenopathy:        Head (right side): No submental, no submandibular, no tonsillar, no preauricular, no posterior auricular and no occipital adenopathy present.        Head (left side): No submental, no submandibular, no tonsillar, no preauricular, no posterior auricular and no occipital adenopathy present.     She has no cervical adenopathy.   Neurological: She is alert and oriented to person, place, and time. She exhibits normal muscle tone. Coordination normal.   Skin: Skin is warm, dry and intact. She is not diaphoretic. No pallor.   Psychiatric: She has a normal mood and affect. Her speech is normal and behavior is normal. Judgment and thought content normal. Cognition and memory are normal.   Nursing note and vitals reviewed.    CXR Impression:  Impression       1. No acute cardiopulmonary process.       Assessment:        1. Bronchitis    2. Cough        Plan:     Pt instructed on OTC meds for symptom relief.     Bronchitis  -     azithromycin (Z-LUCILLE) 250 MG tablet; Take 2 tablets by mouth on day 1; Take 1 tablet by mouth on days 2-5  Dispense: 6 tablet; Refill: 0    Cough  -     POCT Influenza A/B  -     XR CHEST PA AND LATERAL; Future; Expected date: 09/23/2018    Other orders  -     benzonatate (TESSALON) 200 MG capsule; Take 1 capsule (200 mg total) by mouth 3 (three) times daily as needed.  Dispense: 30 capsule; Refill: 0

## 2018-09-26 ENCOUNTER — TELEPHONE (OUTPATIENT)
Dept: URGENT CARE | Facility: CLINIC | Age: 68
End: 2018-09-26

## 2018-10-15 ENCOUNTER — OFFICE VISIT (OUTPATIENT)
Dept: INTERNAL MEDICINE | Facility: CLINIC | Age: 68
End: 2018-10-15
Payer: COMMERCIAL

## 2018-10-15 VITALS
HEART RATE: 83 BPM | RESPIRATION RATE: 12 BRPM | DIASTOLIC BLOOD PRESSURE: 84 MMHG | WEIGHT: 155.88 LBS | OXYGEN SATURATION: 98 % | SYSTOLIC BLOOD PRESSURE: 138 MMHG | TEMPERATURE: 98 F | HEIGHT: 64 IN | BODY MASS INDEX: 26.61 KG/M2

## 2018-10-15 DIAGNOSIS — E05.90 SUBCLINICAL HYPERTHYROIDISM: ICD-10-CM

## 2018-10-15 DIAGNOSIS — Z00.00 HEALTH CARE MAINTENANCE: ICD-10-CM

## 2018-10-15 DIAGNOSIS — J06.9 VIRAL UPPER RESPIRATORY TRACT INFECTION: ICD-10-CM

## 2018-10-15 DIAGNOSIS — Z12.4 CERVICAL CANCER SCREENING: ICD-10-CM

## 2018-10-15 DIAGNOSIS — I65.23 BILATERAL CAROTID ARTERY STENOSIS: ICD-10-CM

## 2018-10-15 DIAGNOSIS — I25.10 CORONARY ARTERY DISEASE INVOLVING NATIVE CORONARY ARTERY OF NATIVE HEART WITHOUT ANGINA PECTORIS: ICD-10-CM

## 2018-10-15 DIAGNOSIS — I10 ESSENTIAL HYPERTENSION: ICD-10-CM

## 2018-10-15 DIAGNOSIS — I25.10 CORONARY ARTERY DISEASE, ANGINA PRESENCE UNSPECIFIED, UNSPECIFIED VESSEL OR LESION TYPE, UNSPECIFIED WHETHER NATIVE OR TRANSPLANTED HEART: ICD-10-CM

## 2018-10-15 DIAGNOSIS — E78.5 DYSLIPIDEMIA: ICD-10-CM

## 2018-10-15 DIAGNOSIS — Z00.00 ANNUAL PHYSICAL EXAM: Primary | ICD-10-CM

## 2018-10-15 DIAGNOSIS — J30.1 ALLERGIC RHINITIS DUE TO POLLEN, UNSPECIFIED SEASONALITY: ICD-10-CM

## 2018-10-15 DIAGNOSIS — Z12.39 BREAST CANCER SCREENING: ICD-10-CM

## 2018-10-15 PROBLEM — J30.9 ALLERGIC RHINITIS: Status: ACTIVE | Noted: 2018-10-15

## 2018-10-15 PROCEDURE — 99215 OFFICE O/P EST HI 40 MIN: CPT | Mod: PBBFAC | Performed by: INTERNAL MEDICINE

## 2018-10-15 PROCEDURE — 99397 PER PM REEVAL EST PAT 65+ YR: CPT | Mod: S$GLB,,, | Performed by: INTERNAL MEDICINE

## 2018-10-15 PROCEDURE — 99999 PR PBB SHADOW E&M-EST. PATIENT-LVL V: CPT | Mod: PBBFAC,,, | Performed by: INTERNAL MEDICINE

## 2018-10-15 RX ORDER — LISINOPRIL 10 MG/1
10 TABLET ORAL DAILY
Qty: 30 TABLET | Refills: 1 | Status: SHIPPED | OUTPATIENT
Start: 2018-10-15 | End: 2018-11-19 | Stop reason: SDUPTHER

## 2018-10-15 RX ORDER — GUAIFENESIN/DEXTROMETHORPHAN 100-10MG/5
10 SYRUP ORAL EVERY 4 HOURS PRN
Qty: 354 ML | Refills: 1 | Status: SHIPPED | OUTPATIENT
Start: 2018-10-15 | End: 2018-12-22 | Stop reason: ALTCHOICE

## 2018-10-15 RX ORDER — FLUTICASONE PROPIONATE 50 MCG
1 SPRAY, SUSPENSION (ML) NASAL DAILY
Qty: 16 G | Refills: 1 | Status: SHIPPED | OUTPATIENT
Start: 2018-10-15 | End: 2018-12-07 | Stop reason: SDUPTHER

## 2018-10-15 NOTE — PATIENT INSTRUCTIONS
1)Antihistamines(Allegra, Claritin, Xzyal, Zyrtec)  2)Nasal Steroids (Nasocort, Rhinocort, Flonase)  3)Distilled salt water sinus rinses via neti pots or products such as [employer] Med Sinus Rinse or Sinugator. Must wash container or device and use bottled water to avoid introducing infection.   You can can use (as directed) any combination of these three things every day of your life if needed in order to treat or control your symptoms. Brand name use of medications is not necessary

## 2018-10-15 NOTE — PROGRESS NOTES
Subjective:       Patient ID: Jessica Floyd is a 68 y.o. female who  has a past medical history of CAD (coronary artery disease), Carotid stenosis, Dyslipidemia, ESBL (extended spectrum beta-lactamase) producing bacteria infection, Hypertension, Nausea and vomiting (5/26/2017), and SAH (subarachnoid hemorrhage).    Chief Complaint: Cough    HPI    History was obtained from the patient and supplemented through chart review.    She is retired. 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    She was previously seen by Dr. Olmedo, who is no longer practicing at this location.  However, she lives near [facility] and would like to continue seeing Dr. Olmedo there.    She was recently seen 9/2018 at Urgent care for Bronchitis/cough and was prescribed a ZPack and tessalon perles.  She reports nasal congestion, dry cough, runny nose, post nasal gtt, itchy watery eyes, and a low grade fever of 100.2 for which she took tylenol.  She denies body aches.  CXR did not reveal consolidation.  She continues to have a dry cough.  Her 1 year old granddaughter was also sick.  She has been staying hydrated and is drinking tea.    She does report sinus symptoms with itchy, watery eyes when she is outside and when she goes to bed at night. She owns [employer] carpets, but has none in her bedroom.  She quit smoking 20 years ago.  She denies recent travel.  She also takes a PPI qAM.      She has a h/o non-obstructive CAD on outside cath in [facility].  She does follow with cardiology.  Her last TTE was 5/2017 with normal EF and no wall motion abnormalities.  Her myocardium perfusion study was normal.  She is taking ASA, statin, BB, but not an ACE.  She is taking Norvasc, Coreg, HCTZ for HTN.  She quit smoking 17 years ago.    Review of Systems   Constitutional: Positive for fever. Negative for chills.   HENT: Positive for congestion, postnasal drip, rhinorrhea and sinus pressure.    Eyes: Positive for itching. Negative for  discharge and redness.   Respiratory: Negative for chest tightness, shortness of breath and wheezing.    Cardiovascular: Negative for chest pain and leg swelling.   Gastrointestinal: Negative for abdominal pain, blood in stool, constipation, diarrhea and vomiting.   Genitourinary: Negative for dysuria and hematuria.   Musculoskeletal: Negative for gait problem and joint swelling.   Skin: Negative for color change and rash.   Neurological: Negative for weakness and headaches.   Hematological: Negative for adenopathy.   Psychiatric/Behavioral: Negative for self-injury and suicidal ideas.       Past Medical History:   Diagnosis Date    CAD (coronary artery disease)     [facility] 2014: 20% mLAD, 50% mCx, 20%, mRCA    Carotid stenosis     R CCA stent, L CCA 60%    Dyslipidemia     ESBL (extended spectrum beta-lactamase) producing bacteria infection     UTI    Hypertension     Nausea and vomiting 2017    SAH (subarachnoid hemorrhage)     , from ruptured aneurysm, s/p clip     Past Surgical History:   Procedure Laterality Date    anneurysm cerebral       ANTERIOR CRUCIATE LIGAMENT REPAIR       Family History   Problem Relation Age of Onset    Aneurysm Mother     Alcohol abuse Father     Heart disease Brother      Social History     Socioeconomic History    Marital status:      Spouse name: Not on file    Number of children: Not on file    Years of education: Not on file    Highest education level: Not on file   Social Needs    Financial resource strain: Not on file    Food insecurity - worry: Not on file    Food insecurity - inability: Not on file    Transportation needs - medical: Not on file    Transportation needs - non-medical: Not on file   Occupational History    Occupation: Retired   Tobacco Use    Smoking status: Former Smoker     Last attempt to quit: 1999     Years since quittin.8    Smokeless tobacco: Never Used   Substance and Sexual Activity     "Alcohol use: Yes     Alcohol/week: 4.2 oz     Types: 7 Glasses of wine per week    Drug use: No    Sexual activity: Not on file   Other Topics Concern    Not on file   Social History Narrative    .  Has 3 grown daughters.   lives in [facility].       Objective:      Vitals:    10/15/18 1622   BP: 138/84   BP Location: Left arm   Patient Position: Sitting   BP Method: Medium (Manual)   Pulse: 83   Resp: 12   Temp: 98 °F (36.7 °C)   SpO2: 98%   Weight: 70.7 kg (155 lb 13.8 oz)   Height: 5' 4" (1.626 m)      Physical Exam   Constitutional: She is oriented to person, place, and time. She appears well-developed and well-nourished. No distress.   HENT:   Head: Normocephalic and atraumatic.   Nose: No mucosal edema or rhinorrhea. Right sinus exhibits maxillary sinus tenderness. Right sinus exhibits no frontal sinus tenderness. Left sinus exhibits maxillary sinus tenderness. Left sinus exhibits no frontal sinus tenderness.   Mouth/Throat: Uvula is midline and mucous membranes are normal. Posterior oropharyngeal erythema present. No oropharyngeal exudate.   Eyes: EOM are normal. Pupils are equal, round, and reactive to light. Right eye exhibits no discharge. Left eye exhibits no discharge. Right conjunctiva is injected. Left conjunctiva is injected. No scleral icterus.   Neck: Neck supple. No tracheal deviation present. No thyromegaly present.   Cardiovascular: Normal rate, regular rhythm, normal heart sounds and intact distal pulses.   No murmur heard.  Pulmonary/Chest: Effort normal and breath sounds normal. No stridor. No respiratory distress. She has no wheezes.   Abdominal: Soft. Bowel sounds are normal. There is no tenderness.   Musculoskeletal: She exhibits no edema or deformity.   Lymphadenopathy:     She has no cervical adenopathy.   Neurological: She is alert and oriented to person, place, and time. Coordination normal.   Skin: Skin is warm and dry. Capillary refill takes less than 2 seconds. She is " not diaphoretic. No erythema.   Psychiatric: She has a normal mood and affect. Her behavior is normal.         Lab Results   Component Value Date    WBC 11.91 05/25/2017    HGB 14.4 05/25/2017    HCT 42.0 05/25/2017     05/25/2017    CHOL 218 (H) 10/27/2017    TRIG 127 10/27/2017     (H) 10/27/2017    ALT 33 05/25/2017    AST 36 05/25/2017     05/26/2017    K 4.1 05/26/2017    CL 97 05/26/2017    CREATININE 1.0 05/26/2017    BUN 16 05/26/2017    CO2 31 (H) 05/26/2017    TSH 0.169 (L) 05/25/2017       Assessment:       1. Annual physical exam    2. Essential hypertension    3. Coronary artery disease, angina presence unspecified, unspecified vessel or lesion type, unspecified whether native or transplanted heart    4. Bilateral carotid artery stenosis    5. Subclinical hyperthyroidism    6. Coronary artery disease involving native coronary artery of native heart without angina pectoris    7. Dyslipidemia    8. Breast cancer screening    9. Viral upper respiratory tract infection    10. Cervical cancer screening    11. Health care maintenance    12. Allergic rhinitis due to pollen, unspecified seasonality          Plan:       Jessica was seen today for cough.    Diagnoses and all orders for this visit:    Annual physical exam    Essential hypertension  Comments:  should be on ACE 2/2 CAD. Discontinuing HCTZ and starting Lisinopril. Continue Norvasc, Coreg. F/u in 1 mo.  Orders:  -     lisinopril 10 MG tablet; Take 1 tablet (10 mg total) by mouth once daily.    Coronary artery disease, angina presence unspecified, unspecified vessel or lesion type, unspecified whether native or transplanted heart  Comments:  Continue ASA, statin, BB. Adding ACE. TTE 5/2017 with normal EF. Follows with Cards    Bilateral carotid artery stenosis  Comments:  Statin and BP control    Subclinical hyperthyroidism  -     TSH; Future  -     T4, free; Future    Coronary artery disease involving native coronary artery of  native heart without angina pectoris    Dyslipidemia  Comments:  continue statin  Orders:  -     Hemoglobin A1c; Future    Breast cancer screening  -     Mammo Digital Screening Bilat with CAD; Future    Viral upper respiratory tract infection  Comments:  Likely viral. CXR without consolidation. Supportive treatment. Treating for AR as below. on PPI for GERD  Orders:  -     Procalcitonin; Future  -     fluticasone (FLONASE) 50 mcg/actuation nasal spray; 1 spray (50 mcg total) by Each Nare route once daily.  -     dextromethorphan-guaifenesin  mg/5 ml (ROBITUSSIN-DM)  mg/5 mL liquid; Take 10 mLs by mouth every 4 (four) hours as needed (cough). Take with plenty of water.  Do not exceed 120mg/day of dextromethorphan.  Do not exceed 2400mg/day guaifenesin from all sources    Cervical cancer screening  Comments:  Patient would like to discuss [facility] OBGYN screening for ovarian cancer  Orders:  -     Ambulatory Referral to Obstetrics / Gynecology    Health care maintenance  -     Influenza - High Dose (65+) (PF) (IM)  -     Zoster Vaccine - Live    Allergic rhinitis due to pollen, unspecified seasonality  Comments:  Educated on correct Flonase use. Could be contributing to cough as well.         Notification of Lab Results: Phone Call    Side effects of medication(s) were discussed in detail and patient voiced understanding.  Patient will call back for any issues or complications.     RTC in 1 month(s) or sooner PRN for BP control.

## 2018-10-16 ENCOUNTER — LAB VISIT (OUTPATIENT)
Dept: LAB | Facility: OTHER | Age: 68
End: 2018-10-16
Payer: COMMERCIAL

## 2018-10-16 ENCOUNTER — TELEPHONE (OUTPATIENT)
Dept: INTERNAL MEDICINE | Facility: CLINIC | Age: 68
End: 2018-10-16

## 2018-10-16 ENCOUNTER — IMMUNIZATION (OUTPATIENT)
Dept: PHARMACY | Facility: CLINIC | Age: 68
End: 2018-10-16
Payer: COMMERCIAL

## 2018-10-16 DIAGNOSIS — J06.9 VIRAL UPPER RESPIRATORY TRACT INFECTION: ICD-10-CM

## 2018-10-16 DIAGNOSIS — E78.5 DYSLIPIDEMIA: ICD-10-CM

## 2018-10-16 DIAGNOSIS — E05.90 SUBCLINICAL HYPERTHYROIDISM: ICD-10-CM

## 2018-10-16 LAB
ESTIMATED AVG GLUCOSE: 108 MG/DL
HBA1C MFR BLD HPLC: 5.4 %
PROCALCITONIN SERPL IA-MCNC: 0.02 NG/ML
T4 FREE SERPL-MCNC: 0.89 NG/DL
TSH SERPL DL<=0.005 MIU/L-ACNC: 1.63 UIU/ML

## 2018-10-16 PROCEDURE — 84439 ASSAY OF FREE THYROXINE: CPT

## 2018-10-16 PROCEDURE — 84443 ASSAY THYROID STIM HORMONE: CPT

## 2018-10-16 PROCEDURE — 83036 HEMOGLOBIN GLYCOSYLATED A1C: CPT

## 2018-10-16 PROCEDURE — 84145 PROCALCITONIN (PCT): CPT

## 2018-10-16 PROCEDURE — 36415 COLL VENOUS BLD VENIPUNCTURE: CPT

## 2018-10-16 NOTE — TELEPHONE ENCOUNTER
----- Message from Aparna Rand MD sent at 10/16/2018  2:20 PM CDT -----  Please call Ms. Floyd to let her know that her labs were normal, including checking for diabetes and thyroid disorder.  In addition, her blood test was not consistent with a bacterial respiratory infection, so no further antibiotics are needed at this time.  It will take some time to fully recover; in the meantime, she may use the Flonase, Robutussin and stay hydrated.

## 2018-10-17 ENCOUNTER — TELEPHONE (OUTPATIENT)
Dept: INTERNAL MEDICINE | Facility: CLINIC | Age: 68
End: 2018-10-17

## 2018-10-17 NOTE — TELEPHONE ENCOUNTER
Pt received her message on the my chart and pt verbally agrees to what the Dr. Rand stated in her message

## 2018-10-19 ENCOUNTER — HOSPITAL ENCOUNTER (OUTPATIENT)
Dept: RADIOLOGY | Facility: OTHER | Age: 68
Discharge: HOME | End: 2018-10-19
Attending: INTERNAL MEDICINE
Payer: COMMERCIAL

## 2018-10-19 VITALS — BODY MASS INDEX: 26.46 KG/M2 | HEIGHT: 64 IN | WEIGHT: 155 LBS

## 2018-10-19 DIAGNOSIS — Z12.39 BREAST CANCER SCREENING: ICD-10-CM

## 2018-10-19 PROCEDURE — 77067 SCR MAMMO BI INCL CAD: CPT | Mod: 26,,, | Performed by: INTERNAL MEDICINE

## 2018-10-19 PROCEDURE — 77063 BREAST TOMOSYNTHESIS BI: CPT | Mod: TC

## 2018-10-19 PROCEDURE — 77063 BREAST TOMOSYNTHESIS BI: CPT | Mod: 26,,, | Performed by: INTERNAL MEDICINE

## 2018-10-19 PROCEDURE — 77067 SCR MAMMO BI INCL CAD: CPT | Mod: TC

## 2018-11-06 ENCOUNTER — OFFICE VISIT (OUTPATIENT)
Dept: OBSTETRICS AND GYNECOLOGY | Facility: CLINIC | Age: 68
End: 2018-11-06
Attending: OBSTETRICS & GYNECOLOGY
Payer: COMMERCIAL

## 2018-11-06 VITALS
BODY MASS INDEX: 27.48 KG/M2 | DIASTOLIC BLOOD PRESSURE: 80 MMHG | HEIGHT: 64 IN | WEIGHT: 160.94 LBS | SYSTOLIC BLOOD PRESSURE: 122 MMHG

## 2018-11-06 DIAGNOSIS — Z12.73 ENCOUNTER FOR SCREENING FOR MALIGNANT NEOPLASM OF OVARY: ICD-10-CM

## 2018-11-06 DIAGNOSIS — Z01.419 WELL WOMAN EXAM: Primary | ICD-10-CM

## 2018-11-06 PROCEDURE — 99387 INIT PM E/M NEW PAT 65+ YRS: CPT | Mod: S$GLB,,, | Performed by: OBSTETRICS & GYNECOLOGY

## 2018-11-06 PROCEDURE — 99999 PR PBB SHADOW E&M-EST. PATIENT-LVL III: CPT | Mod: PBBFAC,,, | Performed by: OBSTETRICS & GYNECOLOGY

## 2018-11-06 PROCEDURE — 88175 CYTOPATH C/V AUTO FLUID REDO: CPT

## 2018-11-06 PROCEDURE — 87624 HPV HI-RISK TYP POOLED RSLT: CPT

## 2018-11-06 RX ORDER — HYDROCHLOROTHIAZIDE 25 MG/1
TABLET ORAL
COMMUNITY
Start: 2018-10-22 | End: 2018-11-28

## 2018-11-06 NOTE — LETTER
November 6, 2018      Aparna Rand MD  [address]  [address]  [facility] [address]           Sp[facility] - OB/GYN [address]  [address] [facility] [address]  [facility] [address]  Phone: [phone]  Fax: [phone]          Patient: Jessica Floyd   MR Number: [id number]   YOB: 1950   Date of Visit: 11/6/2018       Dear Dr. Aparna Rand:    Thank you for referring Jessica Floyd to me for evaluation. Attached you will find relevant portions of my assessment and plan of care.    If you have questions, please do not hesitate to call me. I look forward to following Jessica Floyd along with you.    Sincerely,    Kamini Pino MD    Enclosure  CC:  No Recipients    If you would like to receive this communication electronically, please contact [email or web address] or [phone] to request more information on [facility] access.    For providers and/or their staff who would like to refer a patient to [facility], please contact us through our one-stop-shop provider referral line, [facility], at [phone].    If you feel you have received this communication in error or would no longer like to receive these types of communications, please e-mail [email or web address]

## 2018-11-06 NOTE — PROGRESS NOTES
CC: Well woman exam    Jesscia Floyd is a 68 y.o. female  presents for well woman exam.  LMP: No LMP recorded. Patient is postmenopausal..  No issues, problems, or complaints.  Patient previously living in [facility].  States she has friends who passed away from ovary cancer - she requests screening.  No family history.    Past Medical History:   Diagnosis Date    CAD (coronary artery disease)     Outside [facility] 2014: 20% mLAD, 50% mCx, 20%, mRCA    Carotid stenosis     R CCA stent, L CCA 60%    Dyslipidemia     ESBL (extended spectrum beta-lactamase) producing bacteria infection     UTI    Hypertension     Nausea and vomiting 2017    SAH (subarachnoid hemorrhage)     , from ruptured aneurysm, s/p clip     Past Surgical History:   Procedure Laterality Date    anneurysm cerebral       ANTERIOR CRUCIATE LIGAMENT REPAIR       Social History     Socioeconomic History    Marital status:      Spouse name: Not on file    Number of children: Not on file    Years of education: Not on file    Highest education level: Not on file   Social Needs    Financial resource strain: Not on file    Food insecurity - worry: Not on file    Food insecurity - inability: Not on file    Transportation needs - medical: Not on file    Transportation needs - non-medical: Not on file   Occupational History    Occupation: Retired   Tobacco Use    Smoking status: Former Smoker     Last attempt to quit: 1999     Years since quittin.8    Smokeless tobacco: Never Used   Substance and Sexual Activity    Alcohol use: Yes     Alcohol/week: 4.2 oz     Types: 7 Glasses of wine per week    Drug use: No    Sexual activity: Yes     Partners: Male   Other Topics Concern    Not on file   Social History Narrative    .  Has 3 grown daughters.   lives in [facility].       Family History   Problem Relation Age of Onset    Aneurysm Mother     Alcohol abuse Father     Heart disease Brother   "    OB History      Para Term  AB Living    4 4 4          SAB TAB Ectopic Multiple Live Births                       /80   Ht 5' 4" (1.626 m)   Wt 73 kg (160 lb 15 oz)   BMI 27.62 kg/m²       ROS:  GENERAL: Denies weight gain or weight loss. Feeling well overall.   SKIN: Denies rash or lesions.   HEAD: Denies head injury or headache.   NODES: Denies enlarged lymph nodes.   CHEST: Denies chest pain or shortness of breath.   CARDIOVASCULAR: Denies palpitations or left sided chest pain.   ABDOMEN: No abdominal pain, constipation, diarrhea, nausea, vomiting or rectal bleeding.   URINARY: No frequency, dysuria, hematuria, or burning on urination.  REPRODUCTIVE: See HPI.   BREASTS: The patient performs breast self-examination and denies pain, lumps, or nipple discharge.   HEMATOLOGIC: No easy bruisability or excessive bleeding.   MUSCULOSKELETAL: Denies joint pain or swelling.   NEUROLOGIC: Denies syncope or weakness.   PSYCHIATRIC: Denies depression, anxiety or mood swings.    PHYSICAL EXAM:  APPEARANCE: Well nourished, well developed, in no acute distress.  AFFECT: WNL, alert and oriented x 3  SKIN: No acne or hirsutism  NECK: Neck symmetric without masses or thyromegaly  NODES: No inguinal, cervical, axillary, or femoral lymph node enlargement  CHEST: Good respiratory effect  ABDOMEN: Soft.  No tenderness or masses.  No hepatosplenomegaly.  No hernias.  BREASTS: Symmetrical, no skin changes or visible lesions.  No palpable masses, nipple discharge bilaterally.  PELVIC: Normal external genitalia without lesions.  Normal hair distribution.  Adequate perineal body, normal urethral meatus.  Vagina moist and well rugated without lesions or discharge.  Cervix pink, without lesions, discharge or tenderness.  No significant cystocele or rectocele.  Bimanual exam shows uterus to be normal size, regular, mobile and nontender.  Adnexa without masses or tenderness.    EXTREMITIES: No edema.    ASSESSMENT    " ICD-10-CM ICD-9-CM    1. Well woman exam Z01.419 V72.31 US Pelvis Complete Non OB      CANCER ANTIGEN 125   2. Encounter for screening for malignant neoplasm of ovary Z12.73 V76.46 hydroCHLOROthiazide (HYDRODIURIL) 25 MG tablet      US Pelvis Complete Non OB      CANCER ANTIGEN 125         PLAN:  Well woman exam  -     US Pelvis Complete Non OB; Future; Expected date: 11/06/2018  -     CANCER ANTIGEN 125; Future; Expected date: 11/06/2018    Encounter for screening for malignant neoplasm of ovary  -     US Pelvis Complete Non OB; Future; Expected date: 11/06/2018  -     CANCER ANTIGEN 125; Future; Expected date: 11/06/2018    Discussed with patient - screening may not be covered by insurance - she is aware.  Patient was counseled today on A.C.S. Pap guidelines and recommendations for yearly pelvic exams, mammograms and monthly self breast exams; to see her PCP for other health maintenance.

## 2018-11-09 LAB
HPV HR 12 DNA CVX QL NAA+PROBE: NEGATIVE
HPV16 AG SPEC QL: NEGATIVE
HPV18 DNA SPEC QL NAA+PROBE: NEGATIVE

## 2018-11-15 ENCOUNTER — APPOINTMENT (RX ONLY)
Dept: URBAN - METROPOLITAN AREA CLINIC 98 | Facility: CLINIC | Age: 68
Setting detail: DERMATOLOGY
End: 2018-11-15

## 2018-11-15 DIAGNOSIS — L82.0 INFLAMED SEBORRHEIC KERATOSIS: ICD-10-CM

## 2018-11-15 DIAGNOSIS — F42.4 EXCORIATION (SKIN-PICKING) DISORDER: ICD-10-CM

## 2018-11-15 DIAGNOSIS — Z85.828 PERSONAL HISTORY OF OTHER MALIGNANT NEOPLASM OF SKIN: ICD-10-CM

## 2018-11-15 DIAGNOSIS — L57.8 OTHER SKIN CHANGES DUE TO CHRONIC EXPOSURE TO NONIONIZING RADIATION: ICD-10-CM

## 2018-11-15 DIAGNOSIS — D22 MELANOCYTIC NEVI: ICD-10-CM

## 2018-11-15 DIAGNOSIS — L57.0 ACTINIC KERATOSIS: ICD-10-CM

## 2018-11-15 PROBLEM — D22.71 MELANOCYTIC NEVI OF RIGHT LOWER LIMB, INCLUDING HIP: Status: ACTIVE | Noted: 2018-11-15

## 2018-11-15 PROBLEM — S20.409A UNSPECIFIED SUPERFICIAL INJURIES OF UNSPECIFIED BACK WALL OF THORAX, INITIAL ENCOUNTER: Status: ACTIVE | Noted: 2018-11-15

## 2018-11-15 PROBLEM — S60.912A UNSPECIFIED SUPERFICIAL INJURY OF LEFT WRIST, INITIAL ENCOUNTER: Status: ACTIVE | Noted: 2018-11-15

## 2018-11-15 PROBLEM — S50.911A UNSPECIFIED SUPERFICIAL INJURY OF RIGHT FOREARM, INITIAL ENCOUNTER: Status: ACTIVE | Noted: 2018-11-15

## 2018-11-15 PROBLEM — S50.912A UNSPECIFIED SUPERFICIAL INJURY OF LEFT FOREARM, INITIAL ENCOUNTER: Status: ACTIVE | Noted: 2018-11-15

## 2018-11-15 PROBLEM — D22.72 MELANOCYTIC NEVI OF LEFT LOWER LIMB, INCLUDING HIP: Status: ACTIVE | Noted: 2018-11-15

## 2018-11-15 PROCEDURE — ? PRESCRIPTION

## 2018-11-15 PROCEDURE — ? SUNSCREEN RECOMMENDATIONS

## 2018-11-15 PROCEDURE — 17110 DESTRUCTION B9 LES UP TO 14: CPT

## 2018-11-15 PROCEDURE — ? TREATMENT REGIMEN

## 2018-11-15 PROCEDURE — 17000 DESTRUCT PREMALG LESION: CPT | Mod: 59

## 2018-11-15 PROCEDURE — ? COUNSELING

## 2018-11-15 PROCEDURE — ? LIQUID NITROGEN

## 2018-11-15 PROCEDURE — 99214 OFFICE O/P EST MOD 30 MIN: CPT | Mod: 25

## 2018-11-15 RX ORDER — BETAMETHASONE DIPROPIONATE 0.5 MG/G
OINTMENT TOPICAL
Qty: 1 | Refills: 0 | Status: ERX | COMMUNITY
Start: 2018-11-15

## 2018-11-15 RX ADMIN — BETAMETHASONE DIPROPIONATE: 0.5 OINTMENT TOPICAL at 14:43

## 2018-11-15 ASSESSMENT — LOCATION SIMPLE DESCRIPTION DERM
LOCATION SIMPLE: LEFT WRIST
LOCATION SIMPLE: RIGHT ANTERIOR NECK
LOCATION SIMPLE: LEFT PRETIBIAL REGION
LOCATION SIMPLE: CHEST
LOCATION SIMPLE: RIGHT PRETIBIAL REGION
LOCATION SIMPLE: LEFT CHEEK
LOCATION SIMPLE: LEFT FOREARM
LOCATION SIMPLE: RIGHT CHEEK
LOCATION SIMPLE: LEFT ANTERIOR NECK
LOCATION SIMPLE: RIGHT UPPER BACK
LOCATION SIMPLE: LEFT UPPER BACK
LOCATION SIMPLE: RIGHT ZYGOMA
LOCATION SIMPLE: NECK
LOCATION SIMPLE: NOSE
LOCATION SIMPLE: RIGHT FOREARM

## 2018-11-15 ASSESSMENT — LOCATION DETAILED DESCRIPTION DERM
LOCATION DETAILED: RIGHT DISTAL DORSAL FOREARM
LOCATION DETAILED: LEFT CENTRAL ANTERIOR NECK
LOCATION DETAILED: NASAL SUPRATIP
LOCATION DETAILED: RIGHT MEDIAL ZYGOMA
LOCATION DETAILED: RIGHT SUPERIOR CENTRAL MALAR CHEEK
LOCATION DETAILED: RIGHT INFERIOR LATERAL NECK
LOCATION DETAILED: LEFT SUPERIOR UPPER BACK
LOCATION DETAILED: LEFT DORSAL WRIST
LOCATION DETAILED: STERNAL NOTCH
LOCATION DETAILED: LEFT CENTRAL MALAR CHEEK
LOCATION DETAILED: LEFT DISTAL DORSAL FOREARM
LOCATION DETAILED: RIGHT INFERIOR PREAURICULAR CHEEK
LOCATION DETAILED: LEFT DISTAL PRETIBIAL REGION
LOCATION DETAILED: RIGHT CENTRAL LATERAL NECK
LOCATION DETAILED: LEFT LATERAL MALAR CHEEK
LOCATION DETAILED: LEFT INFERIOR ANTERIOR NECK
LOCATION DETAILED: RIGHT DISTAL PRETIBIAL REGION
LOCATION DETAILED: RIGHT INFERIOR CENTRAL MALAR CHEEK
LOCATION DETAILED: RIGHT SUPERIOR UPPER BACK

## 2018-11-15 ASSESSMENT — LOCATION ZONE DERM
LOCATION ZONE: TRUNK
LOCATION ZONE: NECK
LOCATION ZONE: FACE
LOCATION ZONE: ARM
LOCATION ZONE: LEG
LOCATION ZONE: NOSE

## 2018-11-15 NOTE — PROCEDURE: LIQUID NITROGEN
Consent: The patient's consent was obtained including but not limited to risks of crusting, scabbing, blistering, scarring, darker or lighter pigmentary change, recurrence, incomplete removal and infection.
Number Of Freeze-Thaw Cycles: 2 freeze-thaw cycles
Add 52 Modifier (Optional): no
Medical Necessity Information: It is in your best interest to select a reason for this procedure from the list below. All of these items fulfill various CMS LCD requirements except the new and changing color options.
Post-Care Instructions: LIQUID NITROGEN TREATMENT Patient Information\\nLiquid Nitrogen is a very cold, liquefied gas with a temperature of 320 degrees below zero. It is used to freeze and destroy a variety of skin lesions such as keratoses and warts. It burns when applied and sometimes for several minutes thereafter. There are certain expectations that you should have following treatment:\\n1. The skin tends to become red and swollen within hours of treatment. In many patients, true blisters occur and are especially common around the fingers and eyelids. A scab then forms which will sometimes remain for 1 ­3 weeks and drop off. The lesion treated will often drop off at that point as well.\\n2. If you get a large or tender blister, you may gently prick the blister with a \"sterilized\" (i.e. soaked in alcohol) needle and allow the blister fluid to drain, but leave the blister roof intact. If the blister isn't bothering you, then it's best to leave it alone.\\n3. Clean the treatment site with soap and water once or twice daily. \\n4. We recommend applying petrolatum (Vaseline, Aquaphor) several times daily to the treated areas for the next 1 to 2 weeks. This will speed up healing, decrease pain, and improve the appearance of any scar that may form. We do not recommend antibiotic ointment (Bacitracin or Polysporin) as this has no superiority to pertolatum and may cause an allergic reaction. If the treated area is in a body­fold (arm pit, groin) then zinc oxide paste (Desitin ointment, A&D ointment, Timothy's Butt Paste) may be preferable. A Band­Aid is not necessary unless you find that the area is very weepy. \\n5. In general, you may participate without restriction in your daily activities including sports, swimming, and showering. We do not however recommend getting any wound in fresh or salt water.\\n6. If you have any questions, please contact our office.\\nIMPORTANT: Lesions treated with liquid nitrogen should resolve completely. If a lesion persists beyond 6 weeks we advise you to return to the clinic immediately for re­evaluation. (The  is instructed to get you in immediately.) Warts and Benign Keratoses may require multiple treatment sessions to clear the lesions. Commonly you can develop a white blemish or scar at the treatment site.
Duration Of Freeze Thaw-Cycle (Seconds): 5-10
Render Post-Care Instructions In Note?: yes
Detail Level: Detailed
Medical Necessity Clause: This procedure was medically necessary because the lesions that were treated were:
Number Of Freeze-Thaw Cycles: 1 freeze-thaw cycle
Post-Care Instructions: LIQUID NITROGEN TREATMENT Patient Information\\nLiquid Nitrogen is a very cold, liquefied gas with a temperature of 320 degrees below zero. It is used to freeze and destroy a variety of skin lesions such as keratoses and warts. It burns when applied and sometimes for several minutes thereafter. There are certain expectations that you should have following treatment:\\n1. The skin tends to become red and swollen within hours of treatment. In many patients, true blisters occur and are especially common around the fingers and eyelids. A scab then forms which will sometimes remain for 1 ­3 weeks and drop off. The lesion treated will often drop off at that point as well.\\n2. If you get a large or tender blister, you may gently prick the blister with a \"sterilized\" (i.e. soaked in alcohol) needle and allow the blister fluid to drain, but leave the blister roof intact. If the blister isn't bothering you, then it's best to leave it alone.\\n3. Clean the treatment site with soap and water once or twice daily. \\n4. We recommend applying petrolatum (Vaseline, Aquaphor) several times daily to the treated areas for the next 1 to 2 weeks. This will speed up healing, decrease pain, and improve the appearance of any scar that may form. We do not recommend antibiotic ointment (Bacitracin or Polysporin) as this has no superiority to pertolatum and may cause an allergic reaction. If the treated area is in a body­fold (arm pit, groin) then zinc oxide paste (Desitin ointment, A&D ointment, Timothy's Butt Paste) may be preferable. A Band­Aid is not necessary unless you find that the area is very weepy. \\n5. In general, you may participate without restriction in your daily activities including sports, swimming, and showering. We do not however recommend getting any wound in fresh or salt water.\\n6. If you have any questions, please contact our office.\\nIMPORTANT: Lesions treated with liquid nitrogen should resolve completely. If a lesion persists beyond 6 weeks we advise you to return to the clinic immediately for re­evaluation. (The  is instructed to get you in immediately.) Warts and Benign Keratoses may require multiple treatment sessions to clear the lesions. Commonly you can develop a white blemish or scar at the treatment site.
Duration Of Freeze Thaw-Cycle (Seconds): 4

## 2018-11-15 NOTE — PROCEDURE: TREATMENT REGIMEN
Initiate Treatment: Betamethasone 0.05% ointment BID to affected areas of arms and back.
Detail Level: Zone
Otc Regimen: Neutrogena rapid wrinkle repair Q1-3HS increase as tolerated

## 2018-11-19 DIAGNOSIS — I10 ESSENTIAL HYPERTENSION: ICD-10-CM

## 2018-11-19 RX ORDER — LISINOPRIL 10 MG/1
TABLET ORAL
Qty: 30 TABLET | Refills: 0 | Status: SHIPPED | OUTPATIENT
Start: 2018-11-19 | End: 2019-01-02

## 2018-11-28 ENCOUNTER — OFFICE VISIT (OUTPATIENT)
Dept: PRIMARY CARE CLINIC | Facility: CLINIC | Age: 68
End: 2018-11-28
Attending: FAMILY MEDICINE
Payer: COMMERCIAL

## 2018-11-28 VITALS
DIASTOLIC BLOOD PRESSURE: 70 MMHG | HEIGHT: 64 IN | SYSTOLIC BLOOD PRESSURE: 132 MMHG | WEIGHT: 161.06 LBS | BODY MASS INDEX: 27.5 KG/M2 | HEART RATE: 66 BPM

## 2018-11-28 DIAGNOSIS — E05.90 SUBCLINICAL HYPERTHYROIDISM: ICD-10-CM

## 2018-11-28 DIAGNOSIS — I10 ESSENTIAL HYPERTENSION: Primary | ICD-10-CM

## 2018-11-28 DIAGNOSIS — N18.30 CKD (CHRONIC KIDNEY DISEASE) STAGE 3, GFR 30-59 ML/MIN: ICD-10-CM

## 2018-11-28 DIAGNOSIS — E78.5 DYSLIPIDEMIA: ICD-10-CM

## 2018-11-28 DIAGNOSIS — Z00.00 ANNUAL PHYSICAL EXAM: ICD-10-CM

## 2018-11-28 PROCEDURE — 99214 OFFICE O/P EST MOD 30 MIN: CPT | Mod: S$GLB,,, | Performed by: FAMILY MEDICINE

## 2018-11-28 PROCEDURE — 99999 PR PBB SHADOW E&M-EST. PATIENT-LVL III: CPT | Mod: PBBFAC,,, | Performed by: FAMILY MEDICINE

## 2018-11-28 RX ORDER — ATORVASTATIN CALCIUM 80 MG/1
80 TABLET, FILM COATED ORAL DAILY
Qty: 90 TABLET | Refills: 3 | Status: SHIPPED | OUTPATIENT
Start: 2018-11-28 | End: 2019-11-13 | Stop reason: SDUPTHER

## 2018-11-28 NOTE — PROGRESS NOTES
Subjective:       Patient ID: Jessica Floyd is a 68 y.o. female who  has a past medical history of CAD (coronary artery disease), Carotid stenosis, Dyslipidemia, ESBL (extended spectrum beta-lactamase) producing bacteria infection, Hypertension, Nausea and vomiting (5/26/2017), and SAH (subarachnoid hemorrhage).    Chief Complaint: Establish Care    HPI    History was obtained from the patient and supplemented through chart review.    She is retired. 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    Met patient during UC visit and has since switched to my care from Dr Carter    She does report sinus symptoms with itchy, watery eyes when she is outside and when she goes to bed at night. She owns [facility] carpets, but has none in her bedroom.  She quit smoking 20 years ago.  She denies recent travel.  She also takes a PPI qAM.      She has a h/o non-obstructive CAD on outside cath in [facility].  She does follow with cardiology.  Her last TTE was 5/2017 with normal EF and no wall motion abnormalities.  Her myocardium perfusion study was normal.  She is taking ASA, statin, BB, but not an ACE.  She is taking Norvasc, Coreg, HCTZ for HTN.  She quit smoking 17 years ago.    Review of Systems   Constitutional: Positive for fever. Negative for chills.   HENT: Positive for congestion, postnasal drip, rhinorrhea and sinus pressure.    Eyes: Positive for itching. Negative for discharge and redness.   Respiratory: Negative for chest tightness, shortness of breath and wheezing.    Cardiovascular: Negative for chest pain and leg swelling.   Gastrointestinal: Negative for abdominal pain, blood in stool, constipation, diarrhea and vomiting.   Genitourinary: Negative for dysuria and hematuria.   Musculoskeletal: Negative for gait problem and joint swelling.   Skin: Negative for color change and rash.   Neurological: Negative for weakness and headaches.   Hematological: Negative for adenopathy.   Psychiatric/Behavioral:  "Negative for self-injury and suicidal ideas.       Past Medical History:   Diagnosis Date    CAD (coronary artery disease)     Outside [facility] 2014: 20% mLAD, 50% mCx, 20%, mRCA    Carotid stenosis     R CCA stent, L CCA 60%    Dyslipidemia     ESBL (extended spectrum beta-lactamase) producing bacteria infection     UTI    Hypertension     Nausea and vomiting 2017    SAH (subarachnoid hemorrhage)     , from ruptured aneurysm, s/p clip     Past Surgical History:   Procedure Laterality Date    anneurysm cerebral       ANTERIOR CRUCIATE LIGAMENT REPAIR       Family History   Problem Relation Age of Onset    Aneurysm Mother     Alcohol abuse Father     Heart disease Brother      Social History     Socioeconomic History    Marital status:      Spouse name: Not on file    Number of children: Not on file    Years of education: Not on file    Highest education level: Not on file   Social Needs    Financial resource strain: Not on file    Food insecurity - worry: Not on file    Food insecurity - inability: Not on file    Transportation needs - medical: Not on file    Transportation needs - non-medical: Not on file   Occupational History    Occupation: Retired   Tobacco Use    Smoking status: Former Smoker     Last attempt to quit: 1999     Years since quittin.9    Smokeless tobacco: Never Used   Substance and Sexual Activity    Alcohol use: Yes     Alcohol/week: 4.2 oz     Types: 7 Glasses of wine per week    Drug use: No    Sexual activity: Yes     Partners: Male   Other Topics Concern    Not on file   Social History Narrative    .  Has 3 grown daughters.   lives in [facility].       Objective:      Vitals:    18 1049   BP: 132/70   BP Location: Left arm   Patient Position: Sitting   BP Method: Large (Manual)   Pulse: 66   Weight: 73.1 kg (161 lb 0.7 oz)   Height: 5' 4" (1.626 m)      Physical Exam   Constitutional: She is oriented to person, " place, and time. She appears well-developed and well-nourished. No distress.   HENT:   Head: Normocephalic and atraumatic.   Nose: No mucosal edema or rhinorrhea. Right sinus exhibits maxillary sinus tenderness. Right sinus exhibits no frontal sinus tenderness. Left sinus exhibits maxillary sinus tenderness. Left sinus exhibits no frontal sinus tenderness.   Mouth/Throat: Uvula is midline and mucous membranes are normal. Posterior oropharyngeal erythema present. No oropharyngeal exudate.   Eyes: EOM are normal. Pupils are equal, round, and reactive to light. Right eye exhibits no discharge. Left eye exhibits no discharge. Right conjunctiva is injected. Left conjunctiva is injected. No scleral icterus.   Neck: Neck supple. No tracheal deviation present. No thyromegaly present.   Cardiovascular: Normal rate, regular rhythm, normal heart sounds and intact distal pulses.   No murmur heard.  Pulmonary/Chest: Effort normal and breath sounds normal. No stridor. No respiratory distress. She has no wheezes.   Abdominal: Soft. Bowel sounds are normal. There is no tenderness.   Musculoskeletal: She exhibits no edema or deformity.   Lymphadenopathy:     She has no cervical adenopathy.   Neurological: She is alert and oriented to person, place, and time. Coordination normal.   Skin: Skin is warm and dry. Capillary refill takes less than 2 seconds. She is not diaphoretic. No erythema.   Psychiatric: She has a normal mood and affect. Her behavior is normal.         Lab Results   Component Value Date    WBC 11.91 05/25/2017    HGB 14.4 05/25/2017    HCT 42.0 05/25/2017     05/25/2017    CHOL 218 (H) 10/27/2017    TRIG 127 10/27/2017     (H) 10/27/2017    ALT 33 05/25/2017    AST 36 05/25/2017     05/26/2017    K 4.1 05/26/2017    CL 97 05/26/2017    CREATININE 1.0 05/26/2017    BUN 16 05/26/2017    CO2 31 (H) 05/26/2017    TSH 1.626 10/16/2018    HGBA1C 5.4 10/16/2018       Assessment:       1. Annual physical  exam    2. Dyslipidemia          Plan:       Jessica was seen today for establish care.    Diagnoses and all orders for this visit:    Annual physical exam  -     CBC auto differential; Future  -     Comprehensive metabolic panel; Future  -     Lipid panel; Future  -     Cancel: TSH; Future    Dyslipidemia  -     atorvastatin (LIPITOR) 80 MG tablet; Take 1 tablet (80 mg total) by mouth once daily.    Other orders  -     Cancel: Hepatitis C antibody; Future       RTC in 1 month(s) or sooner PRN for BP control.    RTC 6 mos

## 2018-12-07 DIAGNOSIS — J06.9 VIRAL UPPER RESPIRATORY TRACT INFECTION: ICD-10-CM

## 2018-12-07 RX ORDER — FLUTICASONE PROPIONATE 50 MCG
SPRAY, SUSPENSION (ML) NASAL
Qty: 16 ML | Refills: 3 | Status: SHIPPED | OUTPATIENT
Start: 2018-12-07 | End: 2019-05-12 | Stop reason: SDUPTHER

## 2018-12-22 ENCOUNTER — OFFICE VISIT (OUTPATIENT)
Dept: URGENT CARE | Facility: CLINIC | Age: 68
End: 2018-12-22
Payer: COMMERCIAL

## 2018-12-22 VITALS
RESPIRATION RATE: 18 BRPM | OXYGEN SATURATION: 96 % | DIASTOLIC BLOOD PRESSURE: 87 MMHG | HEART RATE: 84 BPM | TEMPERATURE: 97 F | SYSTOLIC BLOOD PRESSURE: 146 MMHG

## 2018-12-22 DIAGNOSIS — J01.40 ACUTE PANSINUSITIS, RECURRENCE NOT SPECIFIED: Primary | ICD-10-CM

## 2018-12-22 PROCEDURE — 99214 OFFICE O/P EST MOD 30 MIN: CPT | Mod: S$GLB,,, | Performed by: NURSE PRACTITIONER

## 2018-12-22 RX ORDER — BENZONATATE 200 MG/1
200 CAPSULE ORAL 3 TIMES DAILY PRN
Qty: 30 CAPSULE | Refills: 0 | Status: SHIPPED | OUTPATIENT
Start: 2018-12-22 | End: 2018-12-28 | Stop reason: SDUPTHER

## 2018-12-22 RX ORDER — AMOXICILLIN AND CLAVULANATE POTASSIUM 875; 125 MG/1; MG/1
1 TABLET, FILM COATED ORAL 2 TIMES DAILY
Qty: 14 TABLET | Refills: 0 | Status: SHIPPED | OUTPATIENT
Start: 2018-12-22 | End: 2018-12-29

## 2018-12-22 NOTE — PATIENT INSTRUCTIONS
Acute Sinusitis    Acute sinusitis is irritation and swelling of the sinuses. It is usually caused by a viral infection after a common cold. Your doctor can help you find relief.  What is acute sinusitis?  Sinuses are air-filled spaces in the skull behind the face. They are kept moist and clean by a lining of mucosa. Things such as pollen, smoke, and chemical fumes can irritate the mucosa. It can then swell up. As a response to irritation, the mucosa makes more mucus and other fluids. Tiny hairlike cilia cover the mucosa. Cilia help carry mucus toward the opening of the sinus. Too much mucus may cause the cilia to stop working. This blocks the sinus opening. A buildup of fluid in the sinuses then causes pain and pressure. It can also encourage bacteria to grow in the sinuses.  Common symptoms of acute sinusitis  You may have:  · Facial soreness pain  · Headache  · Fever  · Fluid draining in the back of the throat (postnasal drip)  · Congestion  · Drainage that is thick and colored, instead of clear  · Cough  Diagnosing acute sinusitis  Your doctor will ask about your symptoms and health history. He or she will look at your ear, nose, and throat. You usually won't need to have X-rays taken.    The doctor may take a sample of mucus to check for bacteria. If you have sinusitis that keeps coming back, you may need imaging tests such as X-rays or CAT scans. This will help your doctor check for a structural problem that may be causing the infection.  Treating acute sinusitis  Treatment is aimed at unblocking the sinus opening and helping the cilia work again. You may need to take antihistamine and decongestant medicine. These can reduce inflammation and decrease the amount of fluid your sinuses make. If you have a bacterial infection, you will need to take antibiotic medicine for 10 to 14 days. Take this medicine until it is gone, even if you feel better.  Date Last Reviewed: 10/1/2016  © 6187-8133 [employer]  "[employer] [address] [place] [address]. All rights reserved. This information is not intended as a substitute for professional medical care. Always follow your healthcare professional's instructions.      Symptomatic treatment:    Tylenol every 4 hours  Ibuprofen ever 6 hours  salt water gargles  Cold-eeze helps to reduce the duration of sore throat symptoms  Cepachol helps to numb the discomfort  Chloroseptic spray  Nasal saline spray reduces inflammation and dryness  Warm face compresses as often as you can  Vicks vapor rub at night  Flonase OTC or Nasacort OTC  Simple foods like chicken noodle soup help  Delsym helps with coughing at night  Zyrtec/Claritin during the day and Benadryl at night may help if allergy component   Rest as much as you can                                                          If we discussed that I think your illness is viral it will not respond to antibiotics and it will last 10-14 days.  Flonase (fluticasone) is a nasal spray which is available over the counter and may help with your symptoms   Zyrtec D, Claritin D or allegra D can also help with symptoms of congestion and drainage.   If you have hypertension avoid using the "D" which is the decongestant   If you just have drainage you can take plain zyrtec, claritin or allegra   If you just have a congested feeling you can take pseudoephedrine (unless you have high blood pressure) which you have to sign for behind the counter. Do not buy the phenylephrine which is on the shelf as it is not effective   Tylenol or ibuprofen can also be used as directed for pain unless you have an allergy to them or medical condition such as stomach ulcers, kidney or liver disease or blood thinners etc for which you should not be taking these type of medications.   If you are flying in the next few days Afrin nose drops for the airplane flight upon take off and landing may help. Other than at those times refrain from using afrin.       Please " arrange follow up with your primary medical clinic as soon as possible. You must understand that you've received an Urgent Care treatment only and that you may be released before all of your medical problems are known or treated. You, the patient, will arrange for follow up as instructed. If your symptoms worsen or fail to improve you should go to the Emergency Room.

## 2018-12-22 NOTE — PROGRESS NOTES
Subjective:       Patient ID: Jessica Floyd is a 68 y.o. female.    Vitals:  oral temperature is 97.3 °F (36.3 °C). Her blood pressure is 146/87 (abnormal) and her pulse is 84. Her respiration is 18 and oxygen saturation is 96%.     Chief Complaint: Sinus Problem    Patient presents with c/o fever 102.6, sore throat, nasal congestion and dry cough x 2 days.       Sinus Problem   This is a new problem. The current episode started yesterday. The problem is unchanged. The maximum temperature recorded prior to her arrival was 102 - 102.9 F. The fever has been present for 1 to 2 days. Her pain is at a severity of 5/10. She is experiencing no pain. Associated symptoms include congestion, coughing, sinus pressure, sneezing and a sore throat. Pertinent negatives include no chills, diaphoresis, ear pain or shortness of breath. Past treatments include nothing. The treatment provided no relief.       Constitution: Negative for chills, sweating, fatigue and fever.   HENT: Positive for congestion, postnasal drip, sinus pain, sinus pressure and sore throat. Negative for ear pain and voice change.    Neck: Negative for painful lymph nodes.   Eyes: Negative for eye redness.   Respiratory: Positive for cough. Negative for chest tightness, sputum production, bloody sputum, COPD, shortness of breath, stridor, wheezing and asthma.    Gastrointestinal: Negative for nausea and vomiting.   Musculoskeletal: Negative for muscle ache.   Skin: Negative for rash.   Allergic/Immunologic: Positive for sneezing. Negative for seasonal allergies and asthma.   Hematologic/Lymphatic: Negative for swollen lymph nodes.       Objective:      Physical Exam   Constitutional: She is oriented to person, place, and time. She appears well-developed and well-nourished. She is cooperative.  Non-toxic appearance. She does not appear ill. No distress.   HENT:   Head: Normocephalic and atraumatic.   Right Ear: Hearing, tympanic membrane, external ear and ear canal  normal.   Left Ear: Hearing, tympanic membrane, external ear and ear canal normal.   Nose: No mucosal edema, rhinorrhea or nasal deformity. No epistaxis. Right sinus exhibits maxillary sinus tenderness and frontal sinus tenderness. Left sinus exhibits maxillary sinus tenderness and frontal sinus tenderness.   Mouth/Throat: Uvula is midline, oropharynx is clear and moist and mucous membranes are normal. No trismus in the jaw. Normal dentition. No uvula swelling. No posterior oropharyngeal erythema.   Eyes: Conjunctivae and lids are normal. No scleral icterus.   Sclera clear bilat   Neck: Trachea normal, full passive range of motion without pain and phonation normal. Neck supple.   Cardiovascular: Normal rate, regular rhythm, normal heart sounds, intact distal pulses and normal pulses.   Pulmonary/Chest: Effort normal and breath sounds normal. No respiratory distress.   Abdominal: Soft. Normal appearance and bowel sounds are normal. She exhibits no distension. There is no tenderness.   Musculoskeletal: Normal range of motion. She exhibits no edema or deformity.   Neurological: She is alert and oriented to person, place, and time. She exhibits normal muscle tone. Coordination normal.   Skin: Skin is warm, dry and intact. She is not diaphoretic. No pallor.   Psychiatric: She has a normal mood and affect. Her speech is normal and behavior is normal. Judgment and thought content normal. Cognition and memory are normal.   Nursing note and vitals reviewed.      Assessment:       1. Acute pansinusitis, recurrence not specified        Plan:         Acute pansinusitis, recurrence not specified    Other orders  -     amoxicillin-clavulanate 875-125mg (AUGMENTIN) 875-125 mg per tablet; Take 1 tablet by mouth 2 (two) times daily. for 7 days  Dispense: 14 tablet; Refill: 0  -     benzonatate (TESSALON) 200 MG capsule; Take 1 capsule (200 mg total) by mouth 3 (three) times daily as needed.  Dispense: 30 capsule; Refill: 0

## 2018-12-28 RX ORDER — BENZONATATE 200 MG/1
200 CAPSULE ORAL 3 TIMES DAILY PRN
Qty: 30 CAPSULE | Refills: 0 | Status: SHIPPED | OUTPATIENT
Start: 2018-12-28 | End: 2019-01-02 | Stop reason: SDUPTHER

## 2019-01-02 ENCOUNTER — OFFICE VISIT (OUTPATIENT)
Dept: PRIMARY CARE CLINIC | Facility: CLINIC | Age: 69
End: 2019-01-02
Attending: FAMILY MEDICINE
Payer: COMMERCIAL

## 2019-01-02 VITALS
HEIGHT: 64 IN | WEIGHT: 159.5 LBS | DIASTOLIC BLOOD PRESSURE: 68 MMHG | TEMPERATURE: 98 F | HEART RATE: 64 BPM | BODY MASS INDEX: 27.23 KG/M2 | SYSTOLIC BLOOD PRESSURE: 120 MMHG

## 2019-01-02 DIAGNOSIS — M25.431 WRIST SWELLING, RIGHT: ICD-10-CM

## 2019-01-02 DIAGNOSIS — I10 ESSENTIAL HYPERTENSION: ICD-10-CM

## 2019-01-02 DIAGNOSIS — R05.9 COUGH: Primary | ICD-10-CM

## 2019-01-02 DIAGNOSIS — K21.9 GASTROESOPHAGEAL REFLUX DISEASE WITHOUT ESOPHAGITIS: ICD-10-CM

## 2019-01-02 PROCEDURE — 99214 PR OFFICE/OUTPT VISIT, EST, LEVL IV, 30-39 MIN: ICD-10-PCS | Mod: S$GLB,,, | Performed by: FAMILY MEDICINE

## 2019-01-02 PROCEDURE — 99999 PR PBB SHADOW E&M-EST. PATIENT-LVL IV: ICD-10-PCS | Mod: PBBFAC,,, | Performed by: FAMILY MEDICINE

## 2019-01-02 PROCEDURE — 99999 PR PBB SHADOW E&M-EST. PATIENT-LVL IV: CPT | Mod: PBBFAC,,, | Performed by: FAMILY MEDICINE

## 2019-01-02 PROCEDURE — 99214 OFFICE O/P EST MOD 30 MIN: CPT | Mod: S$GLB,,, | Performed by: FAMILY MEDICINE

## 2019-01-02 RX ORDER — BENZONATATE 200 MG/1
200 CAPSULE ORAL 3 TIMES DAILY PRN
Qty: 60 CAPSULE | Refills: 0 | Status: SHIPPED | OUTPATIENT
Start: 2019-01-02 | End: 2019-03-04 | Stop reason: ALTCHOICE

## 2019-01-02 RX ORDER — PROMETHAZINE HYDROCHLORIDE AND CODEINE PHOSPHATE 6.25; 1 MG/5ML; MG/5ML
5 SOLUTION ORAL EVERY 4 HOURS PRN
Qty: 240 ML | Refills: 0 | Status: SHIPPED | OUTPATIENT
Start: 2019-01-02 | End: 2019-01-12

## 2019-01-02 RX ORDER — PANTOPRAZOLE SODIUM 40 MG/1
40 TABLET, DELAYED RELEASE ORAL DAILY
Qty: 90 TABLET | Refills: 3 | Status: SHIPPED | OUTPATIENT
Start: 2019-01-02 | End: 2019-12-19

## 2019-01-02 RX ORDER — HYDROCHLOROTHIAZIDE 25 MG/1
25 TABLET ORAL DAILY
COMMUNITY
End: 2019-07-05 | Stop reason: SDUPTHER

## 2019-01-02 NOTE — PROGRESS NOTES
Subjective:       Patient ID: Jessica Floyd is a 68 y.o. female.    Chief Complaint: Sinusitis; Cough; Nasal Congestion; and Medication Problem (cough)    Pt presents today with f/u from ED where she was dx'd with sinus infection and treated with an abx. Pt here mainly bc she wants a refill on her cough meds.    Pt also states that she has some swelling on her right wrist, noted after she cooks chintan during this holiday    Pt states that she was placed on ACEI recently. Knows that cough is not related to her med most likely but worried if she should be on it since this change was NOT made by her cards doc.  And, pt is known to be a carcinogen per her 's cards doc        Review of Systems   Constitutional: Negative for activity change, appetite change, chills, fatigue and fever.   HENT: Positive for congestion, rhinorrhea, sinus pressure and sore throat. Negative for ear pain and trouble swallowing.    Eyes: Negative for photophobia, pain and visual disturbance.   Respiratory: Positive for cough. Negative for apnea, choking, chest tightness, shortness of breath, wheezing and stridor.    Cardiovascular: Negative for chest pain, palpitations and leg swelling.   Gastrointestinal: Negative for abdominal distention, abdominal pain, constipation, diarrhea, nausea and vomiting.   Genitourinary: Negative.    Musculoskeletal: Negative for back pain, joint swelling and neck pain.   Skin: Negative.    Neurological: Negative for dizziness, tremors, weakness, numbness and headaches.   Psychiatric/Behavioral: Negative for behavioral problems, decreased concentration and sleep disturbance. The patient is not nervous/anxious.    All other systems reviewed and are negative.      Objective:      Physical Exam   Constitutional: She is oriented to person, place, and time. She appears well-developed and well-nourished.   HENT:   Head: Normocephalic and atraumatic.   Right Ear: External ear normal. A middle ear effusion is present.    Left Ear: External ear normal. A middle ear effusion is present.   Nose: Mucosal edema and rhinorrhea present.   Mouth/Throat: Posterior oropharyngeal edema and posterior oropharyngeal erythema present. No oropharyngeal exudate or tonsillar abscesses.   Eyes: EOM are normal. Pupils are equal, round, and reactive to light.   Neck: Normal range of motion. Neck supple. No thyromegaly present.   Cardiovascular: Normal rate, regular rhythm, normal heart sounds and intact distal pulses.   No murmur heard.  Pulmonary/Chest: Effort normal and breath sounds normal. No respiratory distress.   Abdominal: Soft. Bowel sounds are normal. She exhibits no distension and no mass. There is no tenderness. There is no rebound and no guarding.   Musculoskeletal: Normal range of motion. She exhibits no edema or tenderness.   Lymphadenopathy:     She has no cervical adenopathy.   Neurological: She is alert and oriented to person, place, and time. She has normal reflexes.   Skin: Skin is warm and dry. No erythema.   Psychiatric: She has a normal mood and affect. Her behavior is normal. Judgment and thought content normal.       Assessment:       1. Cough    2. Gastroesophageal reflux disease without esophagitis    3. Essential hypertension        Plan:       Cough  -     benzonatate (TESSALON) 200 MG capsule; Take 1 capsule (200 mg total) by mouth 3 (three) times daily as needed.  Dispense: 60 capsule; Refill: 0  -     promethazine-codeine 6.25-10 mg/5 ml (PHENERGAN WITH CODEINE) 6.25-10 mg/5 mL syrup; Take 5 mLs by mouth every 4 (four) hours as needed for Cough.  Dispense: 240 mL; Refill: 0    Gastroesophageal reflux disease without esophagitis  -     pantoprazole (PROTONIX) 40 MG tablet; Take 1 tablet (40 mg total) by mouth once daily.  Dispense: 90 tablet; Refill: 3    Essential hypertension      HTN controlled  RTC prn symptoms    Wrist swelling due to OA; pt can use NSAIDS, heat and wrist splint at nite

## 2019-02-15 ENCOUNTER — HOSPITAL ENCOUNTER (OUTPATIENT)
Dept: RADIOLOGY | Facility: HOSPITAL | Age: 69
Discharge: HOME | End: 2019-02-15
Attending: PHYSICIAN ASSISTANT
Payer: COMMERCIAL

## 2019-02-15 ENCOUNTER — OFFICE VISIT (OUTPATIENT)
Dept: ORTHOPEDICS | Facility: CLINIC | Age: 69
End: 2019-02-15
Payer: COMMERCIAL

## 2019-02-15 VITALS
HEART RATE: 74 BPM | DIASTOLIC BLOOD PRESSURE: 84 MMHG | WEIGHT: 159.63 LBS | SYSTOLIC BLOOD PRESSURE: 150 MMHG | BODY MASS INDEX: 27.4 KG/M2

## 2019-02-15 DIAGNOSIS — G89.29 CHRONIC PAIN OF RIGHT KNEE: Primary | ICD-10-CM

## 2019-02-15 DIAGNOSIS — G89.29 CHRONIC PAIN OF RIGHT KNEE: ICD-10-CM

## 2019-02-15 DIAGNOSIS — M17.11 PRIMARY OSTEOARTHRITIS OF RIGHT KNEE: ICD-10-CM

## 2019-02-15 DIAGNOSIS — M25.561 CHRONIC PAIN OF RIGHT KNEE: ICD-10-CM

## 2019-02-15 DIAGNOSIS — M25.561 CHRONIC PAIN OF RIGHT KNEE: Primary | ICD-10-CM

## 2019-02-15 PROCEDURE — 73562 XR KNEE ORTHO RIGHT: ICD-10-PCS | Mod: 26,RT,, | Performed by: RADIOLOGY

## 2019-02-15 PROCEDURE — 99202 OFFICE O/P NEW SF 15 MIN: CPT | Mod: 25,S$GLB,, | Performed by: PHYSICIAN ASSISTANT

## 2019-02-15 PROCEDURE — 99202 PR OFFICE/OUTPT VISIT, NEW, LEVL II, 15-29 MIN: ICD-10-PCS | Mod: 25,S$GLB,, | Performed by: PHYSICIAN ASSISTANT

## 2019-02-15 PROCEDURE — 73560 X-RAY EXAM OF KNEE 1 OR 2: CPT | Mod: 26,59,LT, | Performed by: RADIOLOGY

## 2019-02-15 PROCEDURE — 20610 PR DRAIN/INJECT LARGE JOINT/BURSA: ICD-10-PCS | Mod: RT,S$GLB,, | Performed by: PHYSICIAN ASSISTANT

## 2019-02-15 PROCEDURE — 99999 PR PBB SHADOW E&M-EST. PATIENT-LVL III: ICD-10-PCS | Mod: PBBFAC,,, | Performed by: PHYSICIAN ASSISTANT

## 2019-02-15 PROCEDURE — 20610 DRAIN/INJ JOINT/BURSA W/O US: CPT | Mod: RT,S$GLB,, | Performed by: PHYSICIAN ASSISTANT

## 2019-02-15 PROCEDURE — 73560 X-RAY EXAM OF KNEE 1 OR 2: CPT | Mod: TC,RT

## 2019-02-15 PROCEDURE — 73560 XR KNEE ORTHO RIGHT: ICD-10-PCS | Mod: 26,59,LT, | Performed by: RADIOLOGY

## 2019-02-15 PROCEDURE — 99999 PR PBB SHADOW E&M-EST. PATIENT-LVL III: CPT | Mod: PBBFAC,,, | Performed by: PHYSICIAN ASSISTANT

## 2019-02-15 PROCEDURE — 73562 X-RAY EXAM OF KNEE 3: CPT | Mod: 26,RT,, | Performed by: RADIOLOGY

## 2019-02-15 RX ORDER — BETAMETHASONE SODIUM PHOSPHATE AND BETAMETHASONE ACETATE 3; 3 MG/ML; MG/ML
6 INJECTION, SUSPENSION INTRA-ARTICULAR; INTRALESIONAL; INTRAMUSCULAR; SOFT TISSUE
Status: COMPLETED | OUTPATIENT
Start: 2019-02-15 | End: 2019-02-15

## 2019-02-15 RX ADMIN — BETAMETHASONE SODIUM PHOSPHATE AND BETAMETHASONE ACETATE 6 MG: 3; 3 INJECTION, SUSPENSION INTRA-ARTICULAR; INTRALESIONAL; INTRAMUSCULAR; SOFT TISSUE at 03:02

## 2019-02-15 NOTE — PROGRESS NOTES
SUBJECTIVE:     Chief Complaint & History of Present Illness:  Jessica Floyd is a Established patient 68 y.o. female who is seen here today with a complaint of  right knee pain .  Patient here for evaluation treatment of sudden onset pain in her right knee over the past month.  She has a marked increase in activity over the past few months associated with  for her grandson.  She does quite a bit of lifting caring crawling and running which has exacerbated the pain and soreness in her knee.  She is having difficulty rising from a kneeling position start-up pain and pain negotiating stairs  On a scale of 1-10, with 10 being worst pain imaginable, he rates this pain as 4 on good days and 9 on bad days.  she describes the pain as tender.    Review of patient's allergies indicates:  No Known Allergies      Current Outpatient Medications   Medication Sig Dispense Refill    amLODIPine (NORVASC) 10 MG tablet Take 1 tablet (10 mg total) by mouth once daily. 90 tablet 3    atorvastatin (LIPITOR) 80 MG tablet Take 1 tablet (80 mg total) by mouth once daily. 90 tablet 3    benzonatate (TESSALON) 200 MG capsule Take 1 capsule (200 mg total) by mouth 3 (three) times daily as needed. 60 capsule 0    betamethasone dipropionate (DIPROLENE) 0.05 % ointment Apply to the affected area of arms and back twice daily . Avoid face 15 g 0    carvedilol (COREG) 12.5 MG tablet Take 1 tablet (12.5 mg total) by mouth 2 (two) times daily with meals. 180 tablet 3    fluticasone (FLONASE) 50 mcg/actuation nasal spray SPRAY ONCE INTO EACH NOSTRIL ONCE DAILY 16 mL 3    hydroCHLOROthiazide (HYDRODIURIL) 25 MG tablet Take 25 mg by mouth once daily.      hydrocortisone 2.5 % cream AAA on face or eyelids twice daily as needed for itch/rash 30 g 0    pantoprazole (PROTONIX) 40 MG tablet Take 1 tablet (40 mg total) by mouth once daily. 90 tablet 3    aspirin (ECOTRIN) 81 MG EC tablet Take 1 tablet (81 mg total) by mouth once daily.  0      No current facility-administered medications for this visit.        Past Medical History:   Diagnosis Date    CAD (coronary artery disease)     Outside [facility] 6/2014: 20% mLAD, 50% mCx, 20%, mRCA    Carotid stenosis     R CCA stent, L CCA 60%    Dyslipidemia     ESBL (extended spectrum beta-lactamase) producing bacteria infection     UTI    Hypertension     Nausea and vomiting 5/26/2017    SAH (subarachnoid hemorrhage)     1999, from ruptured aneurysm, s/p clip       Past Surgical History:   Procedure Laterality Date    anneurysm cerebral   1999    ANTERIOR CRUCIATE LIGAMENT REPAIR  2012       Vital Signs (Most Recent)  Vitals:    02/15/19 1420   BP: (!) 150/84   Pulse: 74           Review of Systems:  ROS:  Constitutional: no fever or chills  Eyes: no visual changes  ENT: no nasal congestion or sore throat  Respiratory: no cough or shortness of breath  Cardiovascular: no chest pain or palpitations, Positive CAD  Gastrointestinal: no nausea or vomiting, tolerating diet, Positive GERD  Genitourinary: no hematuria or dysuria, Positive CKD stage 3  Integument/Breast: no rash or pruritis  Hematologic/Lymphatic: no easy bruising or lymphadenopathy  Musculoskeletal: no arthralgias or myalgias  Neurological: no seizures or tremors, Positive history of subarachnoid hemorrhage  Behavioral/Psych: no auditory or visual hallucinations  Endocrine: no heat or cold intolerance                OBJECTIVE:     PHYSICAL EXAM:    Weight: 72.4 kg (159 lb 9.8 oz), General Appearance: Well nourished, well developed, in no acute distress.  Neurological: Mood & affect are normal.  right  Knee Exam:  Knee Range of Motion:0-110 degrees flexion   Effusion:  Mild  Condition of skin:intact  Location of tenderness:Patella and Patellar tendon   Strength:limited by pain and 5 of 5  Stability:  Lachman: stable, LCL: stable, MCL: stable, PCL: stable and posteromedial (dial): stable  Varus /Valgus stress:  normal  Carlton:    negative/negative    left  Knee Exam:  Knee Range of Motion:0-120 degrees flexion   Effusion:none  Condition of skin:intact  Location of tenderness:None   Strength:5 of 5  Stability:  stable to testing  Varus /Valgus stress:  normal  Carlton:   negative/negative      Hip Examination:  normal    RADIOGRAPHS:      ASSESSMENT/PLAN:       ICD-10-CM ICD-9-CM   1. Chronic pain of right knee M25.561 719.46    G89.29 338.29       Plan: We discussed with the patient at length all the different treatment options available for  the knee including anti-inflammatories, acetaminophen, rest, ice, knee strengthening exercise, occasional cortisone injections for temporary relief, Viscosupplimentation injections, arthroscopic debridement osteotomy, and finally knee arthroplasty.   Will proceed with therapeutic diagnostic cortisone injection of the right knee     The injection site was identified and the skin was prepared with a betadine solution. The   right  knee was injected with 1 ml of Celestone and 5 ml Lidocaine under sterile technique. Jessica Floyd tolerated the procedure well, she was advised to rest the knee today, ice and elevation. she did receive immediate relief of the pain in and about his knee she was told this would be short lived and is secondary to the lidocaine. she may have an increase in his discomfort tonight followed by steady improvement over the next several days. I may take 1-3 weeks following the injection to get the full benefit of the medication.  I will see her back in 4-6 months. Sooner if he has any problems or concerns.    Patient is very interested in pursuing viscosupplementation injections and will contact the clinic when she would like to begin treatment

## 2019-02-21 ENCOUNTER — APPOINTMENT (RX ONLY)
Dept: URBAN - METROPOLITAN AREA CLINIC 98 | Facility: CLINIC | Age: 69
Setting detail: DERMATOLOGY
End: 2019-02-21

## 2019-02-21 DIAGNOSIS — L57.8 OTHER SKIN CHANGES DUE TO CHRONIC EXPOSURE TO NONIONIZING RADIATION: ICD-10-CM

## 2019-02-21 DIAGNOSIS — L57.0 ACTINIC KERATOSIS: ICD-10-CM

## 2019-02-21 DIAGNOSIS — D18.0 HEMANGIOMA: ICD-10-CM

## 2019-02-21 DIAGNOSIS — L81.4 OTHER MELANIN HYPERPIGMENTATION: ICD-10-CM

## 2019-02-21 DIAGNOSIS — B07.8 OTHER VIRAL WARTS: ICD-10-CM

## 2019-02-21 DIAGNOSIS — D485 NEOPLASM OF UNCERTAIN BEHAVIOR OF SKIN: ICD-10-CM

## 2019-02-21 DIAGNOSIS — Z85.828 PERSONAL HISTORY OF OTHER MALIGNANT NEOPLASM OF SKIN: ICD-10-CM

## 2019-02-21 PROBLEM — D48.5 NEOPLASM OF UNCERTAIN BEHAVIOR OF SKIN: Status: ACTIVE | Noted: 2019-02-21

## 2019-02-21 PROBLEM — D18.01 HEMANGIOMA OF SKIN AND SUBCUTANEOUS TISSUE: Status: ACTIVE | Noted: 2019-02-21

## 2019-02-21 PROCEDURE — 17003 DESTRUCT PREMALG LES 2-14: CPT

## 2019-02-21 PROCEDURE — 11102 TANGNTL BX SKIN SINGLE LES: CPT | Mod: 59

## 2019-02-21 PROCEDURE — 99214 OFFICE O/P EST MOD 30 MIN: CPT | Mod: 25

## 2019-02-21 PROCEDURE — 11103 TANGNTL BX SKIN EA SEP/ADDL: CPT

## 2019-02-21 PROCEDURE — 17110 DESTRUCTION B9 LES UP TO 14: CPT

## 2019-02-21 PROCEDURE — 17000 DESTRUCT PREMALG LESION: CPT | Mod: 59

## 2019-02-21 PROCEDURE — ? LIQUID NITROGEN

## 2019-02-21 PROCEDURE — ? COUNSELING

## 2019-02-21 PROCEDURE — ? BIOPSY BY SHAVE METHOD

## 2019-02-21 PROCEDURE — ? SUNSCREEN RECOMMENDATIONS

## 2019-02-21 ASSESSMENT — LOCATION DETAILED DESCRIPTION DERM
LOCATION DETAILED: RIGHT SUPERIOR UPPER BACK
LOCATION DETAILED: RIGHT MEDIAL MALAR CHEEK
LOCATION DETAILED: RIGHT DISTAL RADIAL DORSAL FOREARM
LOCATION DETAILED: LEFT LATERAL SUPERIOR CHEST
LOCATION DETAILED: LEFT DISTAL DORSAL FOREARM
LOCATION DETAILED: LEFT SUPERIOR MEDIAL MIDBACK
LOCATION DETAILED: RIGHT NASAL SIDEWALL
LOCATION DETAILED: RIGHT ANTERIOR SHOULDER
LOCATION DETAILED: RIGHT LATERAL MALAR CHEEK
LOCATION DETAILED: LEFT ANTERIOR SHOULDER
LOCATION DETAILED: MIDDLE STERNUM
LOCATION DETAILED: RIGHT INFERIOR LATERAL MALAR CHEEK
LOCATION DETAILED: RIGHT LATERAL UPPER BACK
LOCATION DETAILED: LEFT AXILLARY VAULT
LOCATION DETAILED: RIGHT SUPERIOR MEDIAL UPPER BACK
LOCATION DETAILED: LEFT VENTRAL PROXIMAL FOREARM
LOCATION DETAILED: RIGHT INFERIOR ANTERIOR NECK
LOCATION DETAILED: LEFT PROXIMAL DORSAL FOREARM
LOCATION DETAILED: RIGHT ANTERIOR DISTAL UPPER ARM
LOCATION DETAILED: LEFT LATERAL ABDOMEN
LOCATION DETAILED: SUPERIOR THORACIC SPINE
LOCATION DETAILED: RIGHT RIB CAGE
LOCATION DETAILED: LEFT POSTERIOR SHOULDER
LOCATION DETAILED: LEFT AXILLARY VAULT
LOCATION DETAILED: NASAL ROOT
LOCATION DETAILED: NASAL SUPRATIP
LOCATION DETAILED: LEFT INFERIOR ANTERIOR NECK

## 2019-02-21 ASSESSMENT — LOCATION SIMPLE DESCRIPTION DERM
LOCATION SIMPLE: RIGHT UPPER BACK
LOCATION SIMPLE: LEFT AXILLARY VAULT
LOCATION SIMPLE: CHEST
LOCATION SIMPLE: UPPER BACK
LOCATION SIMPLE: ABDOMEN
LOCATION SIMPLE: LEFT LOWER BACK
LOCATION SIMPLE: LEFT ANTERIOR NECK
LOCATION SIMPLE: RIGHT CHEEK
LOCATION SIMPLE: LEFT SHOULDER
LOCATION SIMPLE: LEFT AXILLARY VAULT
LOCATION SIMPLE: NOSE
LOCATION SIMPLE: RIGHT ANTERIOR NECK
LOCATION SIMPLE: RIGHT NOSE
LOCATION SIMPLE: RIGHT FOREARM
LOCATION SIMPLE: RIGHT UPPER ARM
LOCATION SIMPLE: RIGHT SHOULDER
LOCATION SIMPLE: LEFT FOREARM

## 2019-02-21 ASSESSMENT — LOCATION ZONE DERM
LOCATION ZONE: NECK
LOCATION ZONE: AXILLAE
LOCATION ZONE: NOSE
LOCATION ZONE: AXILLAE
LOCATION ZONE: TRUNK
LOCATION ZONE: ARM
LOCATION ZONE: FACE

## 2019-02-21 ASSESSMENT — PAIN INTENSITY VAS
HOW INTENSE IS YOUR PAIN 0 BEING NO PAIN, 10 BEING THE MOST SEVERE PAIN POSSIBLE?: 1/10 PAIN
HOW INTENSE IS YOUR PAIN 0 BEING NO PAIN, 10 BEING THE MOST SEVERE PAIN POSSIBLE?: NO PAIN

## 2019-02-21 NOTE — PROCEDURE: BIOPSY BY SHAVE METHOD
Size Of Lesion In Cm: 0
Render Post-Care Instructions In Note?: yes
Electrodesiccation Text: The wound bed was treated with electrodesiccation after the biopsy was performed.
Bill 80457 For Specimen Handling/Conveyance To Laboratory?: no
Depth Of Biopsy: dermis
Type Of Destruction Used: Curettage
Electrodesiccation And Curettage Text: The wound bed was treated with electrodesiccation and curettage after the biopsy was performed.
Billing Type: Third-Party Bill
Biopsy Type: H and E
Anesthesia Volume In Cc (Will Not Render If 0): 1.5
Dressing: Band-Aid
Post-Care Instructions: 1) Gently wash the biopsy site with regular soap and water daily. If a scab develops, you can dilute hydrogen peroxide 1:1 with tap water and apply it to dissolve the crust.\\n2) Keep a small amount of white petrolatum (Aquaphor) and a non­stick bandage on the dressing at all times. Antibiotic ointments (Neosporin, etc) have not shown any superiority to simple petrolatum, cost more, and run the risk of a contact allergy. Keeping the wound covered has been shown to be superior to \"airing it out.\" If the bandage is irritating you, let us know and we can find a less­irritating alternative dressing together.\\n3) Notify the office if significant, persistent bleeding occurs from the biopsy site.\\n4) Do not expose the wound to fresh, salty, or brackish water until it has completely healed (after about 2 weeks). Tap or chlorinated water should be fine.\\n5) A small rim of redness and a small amount of yellow debris on the wound bed are normal. If you encounter increasing warmth, redness, pain, or liquid pus after the first day, these might indicate a localized infection and you should notify our office via phone or email.\\n6) If regular bandaids are uncomfortable or irritating, then oval hydrocolloid dressings (often sold as \"blister pads\") can be cut to fit and placed on the wound after the first 3­4 days, and changed out every 3­4 days. It is ok to wear these dressings in the shower or pool.\\n7) If stitches have been placed, you will need to return to the clinic in 1 or 2 weeks to have them professionally removed. Cutting the knots yourself may leave irritating portions of suture material under the skin.\\n8) Do not assume that \"no news is good news.\" If you have not received a call from our office within 7 days, please let us know so that we can investigate what might be holding up the biopsy report.\\n9) Wound care should continue until the biopsy site is pink, smooth, and dry with new skin, usually by 2 weeks.
Hemostasis: Caprice's
Silver Nitrate Text: The wound bed was treated with silver nitrate after the biopsy was performed.
Curettage Text: The wound bed was treated with curettage after the biopsy was performed.
Notification Instructions: Patient will be notified of biopsy results. However, patient instructed to call the office if not contacted within 2 weeks.
Detail Level: Detailed
Biopsy Method: Dermablade
Lab: [id number]
Consent: Verbal consent was obtained and risks were reviewed including but not limited to scarring, infection, bleeding, scabbing, incomplete removal, nerve damage and allergy to anesthesia.
Cryotherapy Text: The wound bed was treated with cryotherapy after the biopsy was performed.
Anesthesia Type: 1% lidocaine with epinephrine
Wound Care: Petrolatum
Lab Facility: [id number]
Lab: [id number]
Lab Facility: [id number]
Anesthesia Volume In Cc (Will Not Render If 0): 3
Billing Type: Third-Party Bill
Post-Care Instructions: 1) Gently wash the biopsy site with regular soap and water daily. If a scab develops, you can dilute hydrogen peroxide 1:1 with tap water and apply it to dissolve the crust.\\n2) Keep a small amount of white petrolatum (Aquaphor) and a non­stick bandage on the dressing at all times. Antibiotic ointments (Neosporin, etc) have not shown any superiority to simple petrolatum, cost more, and run the risk of a contact allergy. Keeping the wound covered has been shown to be superior to \"airing it out.\" If the bandage is irritating you, let us know and we can find a less­irritating alternative dressing together.\\n3) Notify the office if significant, persistent bleeding occurs from the biopsy site.\\n4) Do not expose the wound to fresh, salty, or brackish water until it has completely healed (after about 2 weeks). Tap or chlorinated water should be fine.\\n5) A small rim of redness and a small amount of yellow debris on the wound bed are normal. If you encounter increasing warmth, redness, pain, or liquid pus after the first day, these might indicate a localized infection and you should notify our office via phone or email.\\n6) If regular bandaids are uncomfortable or irritating, then oval hydrocolloid dressings (often sold as \"blister pads\") can be cut to fit and placed on the wound after the first 3­4 days, and changed out every 3­4 days. It is ok to wear these dressings in the shower or pool.\\n7) If stitches have been placed, you will need to return to the clinic in 1 or 2 weeks to have them professionally removed. Cutting the knots yourself may leave irritating portions of suture material under the skin.\\n8) Do not assume that \"no news is good news.\" If you have not received a call from our office within 7 days, please let us know so that we can investigate what might be holding up the biopsy report.\\n9) Wound care should continue until the biopsy site is pink, smooth, and dry with new skin, usually by 2 weeks.

## 2019-02-21 NOTE — PROCEDURE: LIQUID NITROGEN
Detail Level: Detailed
Consent: The patient's consent was obtained including but not limited to risks of crusting, scabbing, blistering, scarring, darker or lighter pigmentary change, recurrence, incomplete removal and infection.
Post-Care Instructions: LIQUID NITROGEN TREATMENT Patient Information\\nLiquid Nitrogen is a very cold, liquefied gas with a temperature of 320 degrees below zero. It is used to freeze and destroy a variety of skin lesions such as keratoses and warts. It burns when applied and sometimes for several minutes thereafter. There are certain expectations that you should have following treatment:\\n1. The skin tends to become red and swollen within hours of treatment. In many patients, true blisters occur and are especially common around the fingers and eyelids. A scab then forms which will sometimes remain for 1 ­3 weeks and drop off. The lesion treated will often drop off at that point as well.\\n2. If you get a large or tender blister, you may gently prick the blister with a \"sterilized\" (i.e. soaked in alcohol) needle and allow the blister fluid to drain, but leave the blister roof intact. If the blister isn't bothering you, then it's best to leave it alone.\\n3. Clean the treatment site with soap and water once or twice daily. \\n4. We recommend applying petrolatum (Vaseline, Aquaphor) several times daily to the treated areas for the next 1 to 2 weeks. This will speed up healing, decrease pain, and improve the appearance of any scar that may form. We do not recommend antibiotic ointment (Bacitracin or Polysporin) as this has no superiority to pertolatum and may cause an allergic reaction. If the treated area is in a body­fold (arm pit, groin) then zinc oxide paste (Desitin ointment, A&D ointment, Timothy's Butt Paste) may be preferable. A Band­Aid is not necessary unless you find that the area is very weepy. \\n5. In general, you may participate without restriction in your daily activities including sports, swimming, and showering. We do not however recommend getting any wound in fresh or salt water.\\n6. If you have any questions, please contact our office.\\nIMPORTANT: Lesions treated with liquid nitrogen should resolve completely. If a lesion persists beyond 6 weeks we advise you to return to the clinic immediately for re­evaluation. (The  is instructed to get you in immediately.) Warts and Benign Keratoses may require multiple treatment sessions to clear the lesions. Commonly you can develop a white blemish or scar at the treatment site.
Duration Of Freeze Thaw-Cycle (Seconds): 5-10
Render Post-Care Instructions In Note?: yes
Medical Necessity Information: It is in your best interest to select a reason for this procedure from the list below. All of these items fulfill various CMS LCD requirements except the new and changing color options.
Number Of Freeze-Thaw Cycles: 2 freeze-thaw cycles
Medical Necessity Clause: This procedure was medically necessary because the lesions that were treated were:
Add 52 Modifier (Optional): no
Duration Of Freeze Thaw-Cycle (Seconds): 4
Post-Care Instructions: LIQUID NITROGEN TREATMENT Patient Information\\nLiquid Nitrogen is a very cold, liquefied gas with a temperature of 320 degrees below zero. It is used to freeze and destroy a variety of skin lesions such as keratoses and warts. It burns when applied and sometimes for several minutes thereafter. There are certain expectations that you should have following treatment:\\n1. The skin tends to become red and swollen within hours of treatment. In many patients, true blisters occur and are especially common around the fingers and eyelids. A scab then forms which will sometimes remain for 1 ­3 weeks and drop off. The lesion treated will often drop off at that point as well.\\n2. If you get a large or tender blister, you may gently prick the blister with a \"sterilized\" (i.e. soaked in alcohol) needle and allow the blister fluid to drain, but leave the blister roof intact. If the blister isn't bothering you, then it's best to leave it alone.\\n3. Clean the treatment site with soap and water once or twice daily. \\n4. We recommend applying petrolatum (Vaseline, Aquaphor) several times daily to the treated areas for the next 1 to 2 weeks. This will speed up healing, decrease pain, and improve the appearance of any scar that may form. We do not recommend antibiotic ointment (Bacitracin or Polysporin) as this has no superiority to pertolatum and may cause an allergic reaction. If the treated area is in a body­fold (arm pit, groin) then zinc oxide paste (Desitin ointment, A&D ointment, Timothy's Butt Paste) may be preferable. A Band­Aid is not necessary unless you find that the area is very weepy. \\n5. In general, you may participate without restriction in your daily activities including sports, swimming, and showering. We do not however recommend getting any wound in fresh or salt water.\\n6. If you have any questions, please contact our office.\\nIMPORTANT: Lesions treated with liquid nitrogen should resolve completely. If a lesion persists beyond 6 weeks we advise you to return to the clinic immediately for re­evaluation. (The  is instructed to get you in immediately.) Warts and Benign Keratoses may require multiple treatment sessions to clear the lesions. Commonly you can develop a white blemish or scar at the treatment site.
Number Of Freeze-Thaw Cycles: 1 freeze-thaw cycle

## 2019-02-27 ENCOUNTER — APPOINTMENT (RX ONLY)
Dept: URBAN - METROPOLITAN AREA CLINIC 98 | Facility: CLINIC | Age: 69
Setting detail: DERMATOLOGY
End: 2019-02-27

## 2019-02-27 PROBLEM — C44.519 BASAL CELL CARCINOMA OF SKIN OF OTHER PART OF TRUNK: Status: ACTIVE | Noted: 2019-02-27

## 2019-02-27 PROCEDURE — ? CURETTAGE AND DESTRUCTION

## 2019-02-27 PROCEDURE — 17262 DSTRJ MAL LES T/A/L 1.1-2.0: CPT | Mod: 79

## 2019-02-27 NOTE — PROCEDURE: CURETTAGE AND DESTRUCTION
Biopsy Photograph Reviewed: Yes
Consent was obtained from the patient. The risks, benefits and alternatives to therapy were discussed in detail. Specifically, the risks of infection, scarring, bleeding, prolonged wound healing, nerve injury, incomplete removal, allergy to anesthesia and recurrence were addressed. Alternatives to ED&C, such as: surgical removal and XRT were also discussed.  Prior to the procedure, the treatment site was clearly identified and confirmed by the patient. All components of Universal Protocol/PAUSE Rule completed.
Hide Accession Number?: No
Number Of Curettages: 3
What Was Performed First?: Curettage
Bill As A Line Item Or As Units: Line Item
Size Of Lesion In Cm: 1
Post-Care Instructions: Instructions for After Your Removal / Destruction\\n1) Gently wash the biopsy site with regular soap and water daily. If a scab develops, you can dilute hydrogen peroxide 1:1 with tap water and apply it to dissolve the crust.\\n2) Keep a small amount of white petrolatum (Aquaphor) and a non­stick bandage on the dressing at all times. Antibiotic ointments (Neosporin, etc) have not shown any superiority to simple petrolatum, cost more, and run the risk of a contact allergy. Keeping the wound covered has been shown to be superior to \"airing it out.\" If the bandage is irritating you, let us know and we can find a less­irritating alternative dressing together.\\n3) Notify the office if significant, persistent bleeding occurs from the biopsy site.\\n4) Do not expose the wound to fresh, salty, or brackish water until it has completely healed (after about 2 weeks). Tap or chlorinated water should be fine.\\n5) A small rim of redness and a small amount of yellow debris on the wound bed are normal. If you encounter increasing warmth, redness, pain, or liquid pus after the first day, these might indicate a localized infection and you should notify our office via phone or email.\\n6) Wound care should continue until the biopsy site is pink, smooth, and dry with new skin.\\n7) After 1­2 weeks, you may switch from petrolatum and a bandaid to a hydrocolloid dressing such as BandAid Advanced Healing Blister Care pads. These dressings are changed out every 3 days, and it is okay to get them wet.
Detail Level: Detailed
Additional Information: (Optional): The wound was cleaned, and a pressure dressing was applied.  The patient received detailed post-op instructions.
Cautery Type: electrodesiccation
Anesthesia Volume In Cc: 2.5
Concentration (Mg/Ml Or Millions Of Plaque Forming Units/Cc): 0.01
Anesthesia Type: 1% lidocaine without epinephrine
Size Of Lesion After Curettage: 1.3

## 2019-03-04 ENCOUNTER — OFFICE VISIT (OUTPATIENT)
Dept: URGENT CARE | Facility: CLINIC | Age: 69
End: 2019-03-04
Payer: COMMERCIAL

## 2019-03-04 VITALS
HEART RATE: 65 BPM | TEMPERATURE: 98 F | BODY MASS INDEX: 27.14 KG/M2 | OXYGEN SATURATION: 96 % | HEIGHT: 64 IN | RESPIRATION RATE: 18 BRPM | DIASTOLIC BLOOD PRESSURE: 80 MMHG | WEIGHT: 159 LBS | SYSTOLIC BLOOD PRESSURE: 135 MMHG

## 2019-03-04 DIAGNOSIS — R05.9 COUGH: Primary | ICD-10-CM

## 2019-03-04 DIAGNOSIS — J11.1 FLU: ICD-10-CM

## 2019-03-04 LAB
CTP QC/QA: YES
FLUAV AG NPH QL: NEGATIVE
FLUBV AG NPH QL: NEGATIVE

## 2019-03-04 PROCEDURE — 99214 OFFICE O/P EST MOD 30 MIN: CPT | Mod: S$GLB,,, | Performed by: FAMILY MEDICINE

## 2019-03-04 PROCEDURE — 99214 PR OFFICE/OUTPT VISIT, EST, LEVL IV, 30-39 MIN: ICD-10-PCS | Mod: S$GLB,,, | Performed by: FAMILY MEDICINE

## 2019-03-04 PROCEDURE — 87804 INFLUENZA ASSAY W/OPTIC: CPT | Mod: QW,S$GLB,, | Performed by: FAMILY MEDICINE

## 2019-03-04 PROCEDURE — 87804 POCT INFLUENZA A/B: ICD-10-PCS | Mod: 59,QW,S$GLB, | Performed by: FAMILY MEDICINE

## 2019-03-04 RX ORDER — BENZONATATE 100 MG/1
CAPSULE ORAL
Qty: 30 CAPSULE | Refills: 1 | Status: SHIPPED | OUTPATIENT
Start: 2019-03-04 | End: 2019-04-07 | Stop reason: ALTCHOICE

## 2019-03-04 RX ORDER — OSELTAMIVIR PHOSPHATE 75 MG/1
75 CAPSULE ORAL 2 TIMES DAILY
Qty: 10 CAPSULE | Refills: 0 | Status: SHIPPED | OUTPATIENT
Start: 2019-03-04 | End: 2019-03-09

## 2019-03-04 RX ORDER — HYDROCODONE BITARTRATE AND HOMATROPINE METHYLBROMIDE ORAL SOLUTION 5; 1.5 MG/5ML; MG/5ML
5 LIQUID ORAL EVERY 4 HOURS PRN
Qty: 120 ML | Refills: 0 | Status: SHIPPED | OUTPATIENT
Start: 2019-03-04 | End: 2020-02-19

## 2019-03-04 NOTE — PROGRESS NOTES
"Subjective:       Patient ID: Jessica Floyd is a 68 y.o. female.    Vitals:  height is 5' 4" (1.626 m) and weight is 72.1 kg (159 lb). Her tympanic temperature is 97.8 °F (36.6 °C). Her blood pressure is 135/80 and her pulse is 65. Her respiration is 18 and oxygen saturation is 96%.     Chief Complaint: Cough    Patient states she been having a cough and congestion for 5 days.      Cough   This is a new problem. The current episode started in the past 7 days. The problem has been gradually worsening. The cough is productive of sputum. Associated symptoms include headaches, nasal congestion, postnasal drip and a sore throat. Pertinent negatives include no chills, ear pain, eye redness, fever, hemoptysis, myalgias, rash, shortness of breath or wheezing. She has tried OTC cough suppressant for the symptoms. The treatment provided no relief. There is no history of asthma, bronchitis, COPD, emphysema or pneumonia.       Constitution: Negative for chills, sweating, fatigue and fever.   HENT: Positive for congestion, postnasal drip and sore throat. Negative for ear pain, sinus pain, sinus pressure and voice change.    Neck: Negative for painful lymph nodes.   Eyes: Negative for eye redness.   Respiratory: Positive for cough and sputum production. Negative for chest tightness, bloody sputum, COPD, shortness of breath, stridor, wheezing and asthma.    Gastrointestinal: Negative for nausea and vomiting.   Musculoskeletal: Negative for muscle ache.   Skin: Negative for rash.   Allergic/Immunologic: Negative for seasonal allergies and asthma.   Neurological: Positive for headaches.   Hematologic/Lymphatic: Negative for swollen lymph nodes.       Objective:      Physical Exam   Constitutional: She appears well-developed and well-nourished.   HENT:   Head: Normocephalic and atraumatic.   Right Ear: External ear normal.   Left Ear: External ear normal.   Mild erythema of pharynx, Turbinate edema and congestion   Eyes: Conjunctivae " and EOM are normal. Pupils are equal, round, and reactive to light.   Neck: Normal range of motion. Neck supple. No thyromegaly present.   Cardiovascular: Normal rate, regular rhythm, normal heart sounds and intact distal pulses.   Pulmonary/Chest: Effort normal and breath sounds normal.   Lymphadenopathy:     She has no cervical adenopathy.   Psychiatric: She has a normal mood and affect. Her behavior is normal. Judgment and thought content normal.   Vitals reviewed.      Assessment:       1. Cough    2. Flu        Plan:         Cough  -     POCT Influenza A/B  -     oseltamivir (TAMIFLU) 75 MG capsule; Take 1 capsule (75 mg total) by mouth 2 (two) times daily. for 5 days  Dispense: 10 capsule; Refill: 0  -     benzonatate (TESSALON PERLES) 100 MG capsule; 1 or 2 every 8 hours as needed for cough  Dispense: 30 capsule; Refill: 1  -     hydrocodone-homatropine 5-1.5 mg/5 ml (HYCODAN) 5-1.5 mg/5 mL Syrp; Take 5 mLs by mouth every 4 (four) hours as needed.  Dispense: 120 mL; Refill: 0    Flu  -     POCT Influenza A/B  -     oseltamivir (TAMIFLU) 75 MG capsule; Take 1 capsule (75 mg total) by mouth 2 (two) times daily. for 5 days  Dispense: 10 capsule; Refill: 0      Patient Instructions       The Flu (Influenza)     The virus that causes the flu spreads through the air in droplets when someone who has the flu coughs, sneezes, laughs, or talks.   The flu (influenza) is an infection that affects your respiratory tract. This tract is made up of your mouth, nose, and lungs, and the passages between them. Unlike a cold, the flu can make you very ill. And it can lead to pneumonia, a serious lung infection. The flu can have serious complications and even cause death.  Who is at risk for the flu?  Anyone can get the flu. But you are more likely to become infected if you:  · Have a weakened immune system  · Work in a healthcare setting where you may be exposed to flu germs  · Live or work with someone who has the  flu  · Havent had an annual flu shot  How does the flu spread?  The flu is caused by a virus. The virus spreads through the air in droplets when someone who has the flu coughs, sneezes, laughs, or talks. You can become infected when you inhale these viruses directly. You can also become infected when you touch a surface on which the droplets have landed and then transfer the germs to your eyes, nose, or mouth. Touching used tissues, or sharing utensils, drinking glasses, or a toothbrush from an infected person can expose you to flu viruses, too.  What are the symptoms of the flu?  Flu symptoms tend to come on quickly and may last a few days to a few weeks. They include:  · Fever usually higher than 100.4°F  (38°C) and chills  · Sore throat and headache  · Dry cough  · Runny nose  · Tiredness and weakness  · Muscle aches  Who is at risk for flu complications?  For some people, the flu can be very serious. The risk for complications is greater for:  · Children younger than age 5  · Adults ages 65 and older  · People with a chronic illness such as diabetes or heart, kidney, or lung disease  · People who live in a nursing home or long-term care facility   How is the flu treated?  The flu usually gets better after 7 days or so. In some cases, your healthcare provider may prescribe an antiviral medicine. This may help you get well a little sooner. For the medicine to help, you need to take it as soon as possible (ideally within 48 hours) after your symptoms start. If you develop pneumonia or other serious illness, you may need to stay in the hospital.  Easing flu symptoms  · Drink lots of fluids such as water, juice, and warm soup. A good rule is to drink enough so that you urinate your normal amount.  · Get plenty of rest.  · Ask your healthcare provider what to take for fever and pain.  · Call your provider if your fever is 100.4°F (38°C) or higher, or you become dizzy, lightheaded, or short of breath.  Taking steps to  protect others  · Wash your hands often, especially after coughing or sneezing. Or clean your hands with an alcohol-based hand  containing at least 60% alcohol.  · Cough or sneeze into a tissue. Then throw the tissue away and wash your hands. If you dont have a tissue, cough and sneeze into your elbow.  · Stay home until at least 24 hours after you no longer have a fever or chills. Be sure the fever isnt being hidden by fever-reducing medicine.  · Dont share food, utensils, drinking glasses, or a toothbrush with others.  · Ask your healthcare provider if others in your household should get antiviral medicine to help them avoid infection.  How can the flu be prevented?  · One of the best ways to avoid the flu is to get a flu vaccine each year. The virus that causes the flu changes from year to year. For that reason, healthcare providers recommend getting the flu vaccine each year, as soon as it's available in your area. The vaccine is given as a shot. Your healthcare provider can tell you which vaccine is right for you. A nasal spray is also available but is not recommended for the 2043-0153 flu season. The CDC says this is because the nasal spray did not seem to protect against the flu over the last several flu seasons. In the past, it was meant for people ages 2 to 49.  · Wash your hands often. Frequent handwashing is a proven way to help prevent infection.  · Carry an alcohol-based hand gel containing at least 60% alcohol. Use it when you can't use soap and water. Then wash your hands as soon as you can.  · Avoid touching your eyes, nose, and mouth.  · At home and work, clean phones, computer keyboards, and toys often with disinfectant wipes.  · If possible, avoid close contact with others who have the flu or symptoms of the flu.  Handwashing tips  Handwashing is one of the best ways to prevent many common infections. If you are caring for or visiting someone with the flu, wash your hands each time you  enter and leave the room. Follow these steps:  · Use warm water and plenty of soap. Rub your hands together well.  · Clean the whole hand, including under your nails, between your fingers, and up the wrists.  · Wash for at least 15 seconds.  · Rinse, letting the water run down your fingers, not up your wrists.  · Dry your hands well. Use a paper towel to turn off the faucet and open the door.  Using alcohol-based hand   Alcohol-based hand  are also a good choice. Use them when you can't use soap and water. Follow these steps:  · Squeeze about a tablespoon of gel into the palm of one hand.  · Rub your hands together briskly, cleaning the backs of your hands, the palms, between your fingers, and up the wrists.  · Rub until the gel is gone and your hands are completely dry.  Preventing the flu in healthcare settings  The flu is a special concern for people in hospitals and long-term care facilities. To help prevent the spread of flu, many hospitals and nursing homes take these steps:  · Healthcare providers wash their hands or use an alcohol-based hand  before and after treating each patient.  · People with the flu have private rooms and bathrooms or share a room with someone with the same infection.  · People who are at high risk for the flu but don't have it are encouraged to get the flu and pneumonia vaccines.  · All healthcare workers are encouraged or required to get flu shots.   Date Last Reviewed: 12/1/2016  © 8791-9384 [employer]. [address] [place] [address], [place] [address]. All rights reserved. This information is not intended as a substitute for professional medical care. Always follow your healthcare professional's instructions.      Symptomatic treatment:    Alternate Tylenol and Ibuprofen every 3 hrs  salt water gargles to soothe throat  Honey/lemon water to soothe throat  Cepachol helps to numb the discomfort in throat  Nasal saline spray reduces inflammation and  dryness  Warm face compresses/hot showers as often as you can to open sinuses   Flonase OTC or Nasacort OTC  Simple foods like chicken noodle soup help hydrate  Delsym helps with coughing at night or mucinex DM(not D)  Zyrtec/Claritin during the day and Benadryl at night may help if allergy component   Zantac will help if there is reflux from the post nasal drip  Rest as much as you can  Your symptoms will likely last 5-7 days, maybe longer depending on how it affects your body.  You are contagious 5-7, so minimize contact with others to reduce the spread to others and stay home from work or school as we discussed. Dehydration is preventable but is one of the main reasons why you will feel so badly. Drink pedialyte, gatorade or propel. Stay hydrated.    Antibiotics are not needed unless a complication(such as Otitis Media, Bacterial sinus infection or pneumonia). Taking antibiotics for Flu/Cold is not supported by evidencebased medicine and can expose you to unnecessary side effects of the medication, such as anaphylaxis.   If you experience any:  Chest pain, shortness of breath, wheezing or difficulty breathing  Severe headache, face, neck or ear pain  New rash  Fever over 101.5º F (38.6 C) for more than three days  Confusion, behavior change or seizure  Severe weakness or dizziness  Go to ER

## 2019-03-04 NOTE — PATIENT INSTRUCTIONS
The Flu (Influenza)     The virus that causes the flu spreads through the air in droplets when someone who has the flu coughs, sneezes, laughs, or talks.   The flu (influenza) is an infection that affects your respiratory tract. This tract is made up of your mouth, nose, and lungs, and the passages between them. Unlike a cold, the flu can make you very ill. And it can lead to pneumonia, a serious lung infection. The flu can have serious complications and even cause death.  Who is at risk for the flu?  Anyone can get the flu. But you are more likely to become infected if you:  · Have a weakened immune system  · Work in a healthcare setting where you may be exposed to flu germs  · Live or work with someone who has the flu  · Havent had an annual flu shot  How does the flu spread?  The flu is caused by a virus. The virus spreads through the air in droplets when someone who has the flu coughs, sneezes, laughs, or talks. You can become infected when you inhale these viruses directly. You can also become infected when you touch a surface on which the droplets have landed and then transfer the germs to your eyes, nose, or mouth. Touching used tissues, or sharing utensils, drinking glasses, or a toothbrush from an infected person can expose you to flu viruses, too.  What are the symptoms of the flu?  Flu symptoms tend to come on quickly and may last a few days to a few weeks. They include:  · Fever usually higher than 100.4°F  (38°C) and chills  · Sore throat and headache  · Dry cough  · Runny nose  · Tiredness and weakness  · Muscle aches  Who is at risk for flu complications?  For some people, the flu can be very serious. The risk for complications is greater for:  · Children younger than age 5  · Adults ages 65 and older  · People with a chronic illness such as diabetes or heart, kidney, or lung disease  · People who live in a nursing home or long-term care facility   How is the flu treated?  The flu usually gets  better after 7 days or so. In some cases, your healthcare provider may prescribe an antiviral medicine. This may help you get well a little sooner. For the medicine to help, you need to take it as soon as possible (ideally within 48 hours) after your symptoms start. If you develop pneumonia or other serious illness, you may need to stay in the hospital.  Easing flu symptoms  · Drink lots of fluids such as water, juice, and warm soup. A good rule is to drink enough so that you urinate your normal amount.  · Get plenty of rest.  · Ask your healthcare provider what to take for fever and pain.  · Call your provider if your fever is 100.4°F (38°C) or higher, or you become dizzy, lightheaded, or short of breath.  Taking steps to protect others  · Wash your hands often, especially after coughing or sneezing. Or clean your hands with an alcohol-based hand  containing at least 60% alcohol.  · Cough or sneeze into a tissue. Then throw the tissue away and wash your hands. If you dont have a tissue, cough and sneeze into your elbow.  · Stay home until at least 24 hours after you no longer have a fever or chills. Be sure the fever isnt being hidden by fever-reducing medicine.  · Dont share food, utensils, drinking glasses, or a toothbrush with others.  · Ask your healthcare provider if others in your household should get antiviral medicine to help them avoid infection.  How can the flu be prevented?  · One of the best ways to avoid the flu is to get a flu vaccine each year. The virus that causes the flu changes from year to year. For that reason, healthcare providers recommend getting the flu vaccine each year, as soon as it's available in your area. The vaccine is given as a shot. Your healthcare provider can tell you which vaccine is right for you. A nasal spray is also available but is not recommended for the 6310-0429 flu season. The CDC says this is because the nasal spray did not seem to protect against the flu  over the last several flu seasons. In the past, it was meant for people ages 2 to 49.  · Wash your hands often. Frequent handwashing is a proven way to help prevent infection.  · Carry an alcohol-based hand gel containing at least 60% alcohol. Use it when you can't use soap and water. Then wash your hands as soon as you can.  · Avoid touching your eyes, nose, and mouth.  · At home and work, clean phones, computer keyboards, and toys often with disinfectant wipes.  · If possible, avoid close contact with others who have the flu or symptoms of the flu.  Handwashing tips  Handwashing is one of the best ways to prevent many common infections. If you are caring for or visiting someone with the flu, wash your hands each time you enter and leave the room. Follow these steps:  · Use warm water and plenty of soap. Rub your hands together well.  · Clean the whole hand, including under your nails, between your fingers, and up the wrists.  · Wash for at least 15 seconds.  · Rinse, letting the water run down your fingers, not up your wrists.  · Dry your hands well. Use a paper towel to turn off the faucet and open the door.  Using alcohol-based hand   Alcohol-based hand  are also a good choice. Use them when you can't use soap and water. Follow these steps:  · Squeeze about a tablespoon of gel into the palm of one hand.  · Rub your hands together briskly, cleaning the backs of your hands, the palms, between your fingers, and up the wrists.  · Rub until the gel is gone and your hands are completely dry.  Preventing the flu in healthcare settings  The flu is a special concern for people in hospitals and long-term care facilities. To help prevent the spread of flu, many hospitals and nursing homes take these steps:  · Healthcare providers wash their hands or use an alcohol-based hand  before and after treating each patient.  · People with the flu have private rooms and bathrooms or share a room with someone  with the same infection.  · People who are at high risk for the flu but don't have it are encouraged to get the flu and pneumonia vaccines.  · All healthcare workers are encouraged or required to get flu shots.   Date Last Reviewed: 12/1/2016  © 6431-0705 [employer]. [address] [place] [id number]. All rights reserved. This information is not intended as a substitute for professional medical care. Always follow your healthcare professional's instructions.      Symptomatic treatment:    Alternate Tylenol and Ibuprofen every 3 hrs  salt water gargles to soothe throat  Honey/lemon water to soothe throat  Cepachol helps to numb the discomfort in throat  Nasal saline spray reduces inflammation and dryness  Warm face compresses/hot showers as often as you can to open sinuses   Flonase OTC or Nasacort OTC  Simple foods like chicken noodle soup help hydrate  Delsym helps with coughing at night or mucinex DM(not D)  Zyrtec/Claritin during the day and Benadryl at night may help if allergy component   Zantac will help if there is reflux from the post nasal drip  Rest as much as you can  Your symptoms will likely last 5-7 days, maybe longer depending on how it affects your body.  You are contagious 5-7, so minimize contact with others to reduce the spread to others and stay home from work or school as we discussed. Dehydration is preventable but is one of the main reasons why you will feel so badly. Drink pedialyte, gatorade or propel. Stay hydrated.    Antibiotics are not needed unless a complication(such as Otitis Media, Bacterial sinus infection or pneumonia). Taking antibiotics for Flu/Cold is not supported by evidencebased medicine and can expose you to unnecessary side effects of the medication, such as anaphylaxis.   If you experience any:  Chest pain, shortness of breath, wheezing or difficulty breathing  Severe headache, face, neck or ear pain  New rash  Fever over 101.5º F (38.6 C) for more  than three days  Confusion, behavior change or seizure  Severe weakness or dizziness  Go to ER

## 2019-03-08 ENCOUNTER — TELEPHONE (OUTPATIENT)
Dept: URGENT CARE | Facility: CLINIC | Age: 69
End: 2019-03-08

## 2019-03-10 ENCOUNTER — OFFICE VISIT (OUTPATIENT)
Dept: URGENT CARE | Facility: CLINIC | Age: 69
End: 2019-03-10
Payer: COMMERCIAL

## 2019-03-10 VITALS
HEIGHT: 64 IN | HEART RATE: 73 BPM | BODY MASS INDEX: 27.14 KG/M2 | DIASTOLIC BLOOD PRESSURE: 80 MMHG | OXYGEN SATURATION: 97 % | TEMPERATURE: 99 F | WEIGHT: 159 LBS | SYSTOLIC BLOOD PRESSURE: 120 MMHG | RESPIRATION RATE: 18 BRPM

## 2019-03-10 DIAGNOSIS — J32.9 BACTERIAL SINUSITIS: Primary | ICD-10-CM

## 2019-03-10 DIAGNOSIS — B96.89 BACTERIAL SINUSITIS: Primary | ICD-10-CM

## 2019-03-10 PROCEDURE — 99214 PR OFFICE/OUTPT VISIT, EST, LEVL IV, 30-39 MIN: ICD-10-PCS | Mod: S$GLB,,, | Performed by: FAMILY MEDICINE

## 2019-03-10 PROCEDURE — 99214 OFFICE O/P EST MOD 30 MIN: CPT | Mod: S$GLB,,, | Performed by: FAMILY MEDICINE

## 2019-03-10 RX ORDER — AMOXICILLIN AND CLAVULANATE POTASSIUM 875; 125 MG/1; MG/1
1 TABLET, FILM COATED ORAL EVERY 12 HOURS
Qty: 14 TABLET | Refills: 0 | Status: SHIPPED | OUTPATIENT
Start: 2019-03-10 | End: 2019-03-17

## 2019-03-10 NOTE — PATIENT INSTRUCTIONS
PLEASE READ YOUR DISCHARGE INSTRUCTIONS ENTIRELY AS IT CONTAINS IMPORTANT INFORMATION.      Please drink plenty of fluids.    Please get plenty of rest.    Please return here or go to the Emergency Department for any concerns or worsening of condition.    Please take an over the counter antihistamine medication (allegra/Claritin/Zyrtec) of your choice as directed.      If you do have Hypertension or palpitations, it is safe to take Coricidin HBP for relief of sinus symptoms.    If not allergic, please take over the counter Tylenol (Acetaminophen) and/or Motrin (Ibuprofen) as directed for control of pain and/or fever.  Please follow up with your primary care doctor or specialist as needed.    Sore throat recommendations: Warm fluids, warm salt water gargles, throat lozenges, tea, honey, soup, rest, hydration.    Use over the counter flonase: one spray each nostril twice daily OR two sprays each nostril once daily.     If you  smoke, please stop smoking.      Please return or see your primary care doctor if you develop new or worsening symptoms.     Please arrange follow up with your primary medical clinic as soon as possible. You must understand that you've received an Urgent Care treatment only and that you may be released before all of your medical problems are known or treated. You, the patient, will arrange for follow up as instructed. If your symptoms worsen or fail to improve you should go to the Emergency Room.    Sinusitis (Antibiotic Treatment)    The sinuses are air-filled spaces within the bones of the face. They connect to the inside of the nose. Sinusitis is an inflammation of the tissue lining the sinus cavity. Sinus inflammation can occur during a cold. It can also be due to allergies to pollens and other particles in the air. Sinusitis can cause symptoms of sinus congestion and fullness. A sinus infection causes fever, headache and facial pain. There is often green or yellow drainage from the nose or  into the back of the throat (post-nasal drip). You have been given antibiotics to treat this condition.  Home care:  · Take the full course of antibiotics as instructed. Do not stop taking them, even if you feel better.  · Drink plenty of water, hot tea, and other liquids. This may help thin mucus. It also may promote sinus drainage.  · Heat may help soothe painful areas of the face. Use a towel soaked in hot water. Or,  the shower and direct the hot spray onto your face. Using a vaporizer along with a menthol rub at night may also help.   · An expectorant containing guaifenesin may help thin the mucus and promote drainage from the sinuses.  · Over-the-counter decongestants may be used unless a similar medicine was prescribed. Nasal sprays work the fastest. Use one that contains phenylephrine or oxymetazoline. First blow the nose gently. Then use the spray. Do not use these medicines more often than directed on the label or symptoms may get worse. You may also use tablets containing pseudoephedrine. Avoid products that combine ingredients, because side effects may be increased. Read labels. You can also ask the pharmacist for help. (NOTE: Persons with high blood pressure should not use decongestants. They can raise blood pressure.)  · Over-the-counter antihistamines may help if allergies contributed to your sinusitis.    · Do not use nasal rinses or irrigation during an acute sinus infection, unless told to by your health care provider. Rinsing may spread the infection to other sinuses.  · Use acetaminophen or ibuprofen to control pain, unless another pain medicine was prescribed. (If you have chronic liver or kidney disease or ever had a stomach ulcer, talk with your doctor before using these medicines. Aspirin should never be used in anyone under 18 years of age who is ill with a fever. It may cause severe liver damage.)  · Don't smoke. This can worsen symptoms.  Follow-up care  Follow up with your  healthcare provider or our staff if you are not improving within the next week.  When to seek medical advice  Call your healthcare provider if any of these occur:  · Facial pain or headache becoming more severe  · Stiff neck  · Unusual drowsiness or confusion  · Swelling of the forehead or eyelids  · Vision problems, including blurred or double vision  · Fever of 100.4ºF (38ºC) or higher, or as directed by your healthcare provider  · Seizure  · Breathing problems  · Symptoms not resolving within 10 days  Date Last Reviewed: 4/13/2015 © 2000-2017 [employer]. [address] [place] [address]. All rights reserved. This information is not intended as a substitute for professional medical care. Always follow your healthcare professional's instructions.

## 2019-03-10 NOTE — PROGRESS NOTES
"Subjective:       Patient ID: Jessica Floyd is a 68 y.o. female.    Vitals:  height is 5' 4" (1.626 m) and weight is 72.1 kg (159 lb). Her temperature is 99.2 °F (37.3 °C). Her blood pressure is 120/80 and her pulse is 73. Her respiration is 18 and oxygen saturation is 97%.     Chief Complaint: Cough    Pt was here no 3/4 for flu like sx, states not much better, sinus pressure getting worse, low grade fever, cough. No sob. Cough not worsening. Feels more in her head than her chest. Taking flonase. Took tamiflu and has cough syrup      Cough   This is a new problem. The current episode started 1 to 4 weeks ago. The problem has been unchanged. The problem occurs constantly. Associated symptoms include a fever, postnasal drip and a sore throat. Pertinent negatives include no chills, ear pain, eye redness, hemoptysis, myalgias, rash, shortness of breath or wheezing. Treatments tried: cough med. The treatment provided mild relief.       Constitution: Positive for fever. Negative for chills, sweating and fatigue.   HENT: Positive for postnasal drip, sinus pain, sinus pressure and sore throat. Negative for ear pain, congestion and voice change.    Neck: Negative for painful lymph nodes.   Eyes: Negative for eye redness.   Respiratory: Positive for cough. Negative for chest tightness, sputum production, bloody sputum, COPD, shortness of breath, stridor, wheezing and asthma.    Gastrointestinal: Negative for nausea and vomiting.   Musculoskeletal: Negative for muscle ache.   Skin: Negative for rash.   Allergic/Immunologic: Negative for seasonal allergies and asthma.   Hematologic/Lymphatic: Negative for swollen lymph nodes.       Objective:      Physical Exam   Constitutional: She is oriented to person, place, and time. She appears well-developed and well-nourished. She is cooperative.  Non-toxic appearance. She does not appear ill. No distress.   HENT:   Head: Normocephalic and atraumatic.   Right Ear: Hearing, tympanic " membrane, external ear and ear canal normal.   Left Ear: Hearing, tympanic membrane, external ear and ear canal normal.   Nose: Mucosal edema present. No rhinorrhea or nasal deformity. No epistaxis. Right sinus exhibits maxillary sinus tenderness and frontal sinus tenderness. Left sinus exhibits no maxillary sinus tenderness and no frontal sinus tenderness.   Mouth/Throat: Uvula is midline, oropharynx is clear and moist and mucous membranes are normal. No trismus in the jaw. Normal dentition. No uvula swelling. No oropharyngeal exudate or posterior oropharyngeal erythema.   Eyes: Conjunctivae and lids are normal. No scleral icterus.   Sclera clear bilat   Neck: Trachea normal, full passive range of motion without pain and phonation normal. Neck supple.   Cardiovascular: Normal rate, regular rhythm, normal heart sounds, intact distal pulses and normal pulses.   Pulmonary/Chest: Effort normal and breath sounds normal. No accessory muscle usage. No tachypnea. No respiratory distress. She has no decreased breath sounds. She has no wheezes. She has no rhonchi. She has no rales.   NAD able to speak in clear complete sentences   Abdominal: Soft. Normal appearance and bowel sounds are normal. She exhibits no distension. There is no tenderness.   Musculoskeletal: Normal range of motion. She exhibits no edema or deformity.   Neurological: She is alert and oriented to person, place, and time. She exhibits normal muscle tone. Coordination normal.   Skin: Skin is warm, dry and intact. She is not diaphoretic. No pallor.   Psychiatric: She has a normal mood and affect. Her speech is normal and behavior is normal. Judgment and thought content normal. Cognition and memory are normal.   Nursing note and vitals reviewed.      Assessment:       1. Bacterial sinusitis        Plan:         Bacterial sinusitis  -     amoxicillin-clavulanate 875-125mg (AUGMENTIN) 875-125 mg per tablet; Take 1 tablet by mouth every 12 (twelve) hours. for 7  days  Dispense: 14 tablet; Refill: 0          Patient Instructions   PLEASE READ YOUR DISCHARGE INSTRUCTIONS ENTIRELY AS IT CONTAINS IMPORTANT INFORMATION.      Please drink plenty of fluids.    Please get plenty of rest.    Please return here or go to the Emergency Department for any concerns or worsening of condition.    Please take an over the counter antihistamine medication (allegra/Claritin/Zyrtec) of your choice as directed.      If you do have Hypertension or palpitations, it is safe to take Coricidin HBP for relief of sinus symptoms.    If not allergic, please take over the counter Tylenol (Acetaminophen) and/or Motrin (Ibuprofen) as directed for control of pain and/or fever.  Please follow up with your primary care doctor or specialist as needed.    Sore throat recommendations: Warm fluids, warm salt water gargles, throat lozenges, tea, honey, soup, rest, hydration.    Use over the counter flonase: one spray each nostril twice daily OR two sprays each nostril once daily.     If you  smoke, please stop smoking.      Please return or see your primary care doctor if you develop new or worsening symptoms.     Please arrange follow up with your primary medical clinic as soon as possible. You must understand that you've received an Urgent Care treatment only and that you may be released before all of your medical problems are known or treated. You, the patient, will arrange for follow up as instructed. If your symptoms worsen or fail to improve you should go to the Emergency Room.    Sinusitis (Antibiotic Treatment)    The sinuses are air-filled spaces within the bones of the face. They connect to the inside of the nose. Sinusitis is an inflammation of the tissue lining the sinus cavity. Sinus inflammation can occur during a cold. It can also be due to allergies to pollens and other particles in the air. Sinusitis can cause symptoms of sinus congestion and fullness. A sinus infection causes fever, headache and  facial pain. There is often green or yellow drainage from the nose or into the back of the throat (post-nasal drip). You have been given antibiotics to treat this condition.  Home care:  · Take the full course of antibiotics as instructed. Do not stop taking them, even if you feel better.  · Drink plenty of water, hot tea, and other liquids. This may help thin mucus. It also may promote sinus drainage.  · Heat may help soothe painful areas of the face. Use a towel soaked in hot water. Or,  the shower and direct the hot spray onto your face. Using a vaporizer along with a menthol rub at night may also help.   · An expectorant containing guaifenesin may help thin the mucus and promote drainage from the sinuses.  · Over-the-counter decongestants may be used unless a similar medicine was prescribed. Nasal sprays work the fastest. Use one that contains phenylephrine or oxymetazoline. First blow the nose gently. Then use the spray. Do not use these medicines more often than directed on the label or symptoms may get worse. You may also use tablets containing pseudoephedrine. Avoid products that combine ingredients, because side effects may be increased. Read labels. You can also ask the pharmacist for help. (NOTE: Persons with high blood pressure should not use decongestants. They can raise blood pressure.)  · Over-the-counter antihistamines may help if allergies contributed to your sinusitis.    · Do not use nasal rinses or irrigation during an acute sinus infection, unless told to by your health care provider. Rinsing may spread the infection to other sinuses.  · Use acetaminophen or ibuprofen to control pain, unless another pain medicine was prescribed. (If you have chronic liver or kidney disease or ever had a stomach ulcer, talk with your doctor before using these medicines. Aspirin should never be used in anyone under 18 years of age who is ill with a fever. It may cause severe liver damage.)  · Don't smoke.  This can worsen symptoms.  Follow-up care  Follow up with your healthcare provider or our staff if you are not improving within the next week.  When to seek medical advice  Call your healthcare provider if any of these occur:  · Facial pain or headache becoming more severe  · Stiff neck  · Unusual drowsiness or confusion  · Swelling of the forehead or eyelids  · Vision problems, including blurred or double vision  · Fever of 100.4ºF (38ºC) or higher, or as directed by your healthcare provider  · Seizure  · Breathing problems  · Symptoms not resolving within 10 days  Date Last Reviewed: 4/13/2015  © 8081-6002 [employer]. [address] [place] [address], [place] [address]. All rights reserved. This information is not intended as a substitute for professional medical care. Always follow your healthcare professional's instructions.

## 2019-03-11 ENCOUNTER — TELEPHONE (OUTPATIENT)
Dept: URGENT CARE | Facility: CLINIC | Age: 69
End: 2019-03-11

## 2019-03-18 ENCOUNTER — TELEPHONE (OUTPATIENT)
Dept: ORTHOPEDICS | Facility: CLINIC | Age: 69
End: 2019-03-18

## 2019-03-18 DIAGNOSIS — M17.0 PRIMARY OSTEOARTHRITIS OF BOTH KNEES: Primary | ICD-10-CM

## 2019-03-18 NOTE — TELEPHONE ENCOUNTER
----- Message from Layla Estevez sent at 3/18/2019 10:59 AM CDT -----  Contact: self  Pt is calling in regards to questions and concerns before scheduling an injection appt.     Pt would like a call back at [phone].    Thank you

## 2019-04-02 ENCOUNTER — OFFICE VISIT (OUTPATIENT)
Dept: ORTHOPEDICS | Facility: CLINIC | Age: 69
End: 2019-04-02
Payer: COMMERCIAL

## 2019-04-02 VITALS
DIASTOLIC BLOOD PRESSURE: 85 MMHG | WEIGHT: 160.5 LBS | HEART RATE: 77 BPM | BODY MASS INDEX: 27.4 KG/M2 | SYSTOLIC BLOOD PRESSURE: 143 MMHG | HEIGHT: 64 IN

## 2019-04-02 DIAGNOSIS — M17.0 PRIMARY OSTEOARTHRITIS OF BOTH KNEES: Primary | ICD-10-CM

## 2019-04-02 PROCEDURE — 20610 DRAIN/INJ JOINT/BURSA W/O US: CPT | Mod: 50,S$GLB,, | Performed by: PHYSICIAN ASSISTANT

## 2019-04-02 PROCEDURE — 99999 PR PBB SHADOW E&M-EST. PATIENT-LVL III: ICD-10-PCS | Mod: PBBFAC,,, | Performed by: PHYSICIAN ASSISTANT

## 2019-04-02 PROCEDURE — 99499 UNLISTED E&M SERVICE: CPT | Mod: S$GLB,,, | Performed by: PHYSICIAN ASSISTANT

## 2019-04-02 PROCEDURE — 20610 PR DRAIN/INJECT LARGE JOINT/BURSA: ICD-10-PCS | Mod: 50,S$GLB,, | Performed by: PHYSICIAN ASSISTANT

## 2019-04-02 PROCEDURE — 99999 PR PBB SHADOW E&M-EST. PATIENT-LVL III: CPT | Mod: PBBFAC,,, | Performed by: PHYSICIAN ASSISTANT

## 2019-04-02 PROCEDURE — 99499 NO LOS: ICD-10-PCS | Mod: S$GLB,,, | Performed by: PHYSICIAN ASSISTANT

## 2019-04-02 NOTE — PROGRESS NOTES
Jessica Floyd is a 68 y.o. year old her here today for her 1st Euflexxa injection for degenerative changes of her bilateral knee . she was last seen and treated in the clinic on 2/15/2019. There has been no significant change in her medical status since her last visit. No Fever, chills, malaise, or unexplained weight change.      Allergies, Medications, past medical and surgical history were reviewed .    Examination of the knee demonstrates  No evidence of edema, erythema , echymosis strength and range of motion are unchanged from previous visit.    The injection site was identified and the skin was prepared with a betadine solution. The  bilateral knee  joint was injected with 2 ml of Euflexxa solution under sterile technique. Jessica Floyd tolerated the procedure well, she was advised to rest the knee today, ice and elevation. I may take 3 -6 weeks following the last injection to get the full benefit of the medication.  I will see her back in 1 week. Sooner if he has any problems or concerns.           .     ICD-10-CM ICD-9-CM   1. Primary osteoarthritis of both knees M17.0 715.16

## 2019-04-07 ENCOUNTER — OFFICE VISIT (OUTPATIENT)
Dept: URGENT CARE | Facility: CLINIC | Age: 69
End: 2019-04-07
Payer: COMMERCIAL

## 2019-04-07 VITALS
OXYGEN SATURATION: 95 % | RESPIRATION RATE: 18 BRPM | SYSTOLIC BLOOD PRESSURE: 127 MMHG | HEIGHT: 64 IN | BODY MASS INDEX: 27.31 KG/M2 | WEIGHT: 160 LBS | HEART RATE: 80 BPM | DIASTOLIC BLOOD PRESSURE: 78 MMHG | TEMPERATURE: 98 F

## 2019-04-07 DIAGNOSIS — R05.9 COUGH: ICD-10-CM

## 2019-04-07 DIAGNOSIS — J32.9 BACTERIAL SINUSITIS: Primary | ICD-10-CM

## 2019-04-07 DIAGNOSIS — B96.89 BACTERIAL SINUSITIS: Primary | ICD-10-CM

## 2019-04-07 LAB
CTP QC/QA: YES
FLUAV AG NPH QL: NEGATIVE
FLUBV AG NPH QL: NEGATIVE

## 2019-04-07 PROCEDURE — 99213 OFFICE O/P EST LOW 20 MIN: CPT | Mod: S$GLB,,, | Performed by: FAMILY MEDICINE

## 2019-04-07 PROCEDURE — 99213 PR OFFICE/OUTPT VISIT, EST, LEVL III, 20-29 MIN: ICD-10-PCS | Mod: S$GLB,,, | Performed by: FAMILY MEDICINE

## 2019-04-07 PROCEDURE — 87804 POCT INFLUENZA A/B: ICD-10-PCS | Mod: 59,QW,S$GLB, | Performed by: FAMILY MEDICINE

## 2019-04-07 PROCEDURE — 87804 INFLUENZA ASSAY W/OPTIC: CPT | Mod: QW,S$GLB,, | Performed by: FAMILY MEDICINE

## 2019-04-07 RX ORDER — BENZONATATE 200 MG/1
200 CAPSULE ORAL 3 TIMES DAILY PRN
Qty: 30 CAPSULE | Refills: 0 | Status: SHIPPED | OUTPATIENT
Start: 2019-04-07 | End: 2020-02-19

## 2019-04-07 RX ORDER — DOXYCYCLINE 100 MG/1
100 CAPSULE ORAL EVERY 12 HOURS
Qty: 14 CAPSULE | Refills: 0 | Status: SHIPPED | OUTPATIENT
Start: 2019-04-07 | End: 2019-04-14

## 2019-04-07 NOTE — PROGRESS NOTES
"Subjective:       Patient ID: Jessica Floyd is a 68 y.o. female.    Vitals:  height is 5' 4" (1.626 m) and weight is 72.6 kg (160 lb). Her tympanic temperature is 98.4 °F (36.9 °C). Her blood pressure is 127/78 and her pulse is 80. Her respiration is 18 and oxygen saturation is 95%.     Chief Complaint: Cough    Patient presents with c/o cough and sinus congestion since Thursday. Patient states the cough is a dry hacking cough - no sob. Patient goes back in forth between chills and fever. Notes fever of 101.0 F.     Cough   This is a new problem. Episode onset: thursday. The problem has been unchanged. The problem occurs every few minutes. The cough is productive of sputum. Associated symptoms include a fever and a sore throat. Pertinent negatives include no chills, ear pain, eye redness, hemoptysis, myalgias, rash, shortness of breath or wheezing. Treatments tried: dayquil. The treatment provided mild relief. Her past medical history is significant for bronchitis and pneumonia.       Constitution: Positive for fever. Negative for chills, sweating and fatigue.   HENT: Positive for congestion, sinus pressure, sore throat and voice change. Negative for ear pain and sinus pain.    Neck: Negative for painful lymph nodes.   Eyes: Negative for eye redness.   Respiratory: Positive for cough and sputum production. Negative for chest tightness, bloody sputum, COPD, shortness of breath, stridor, wheezing and asthma.    Gastrointestinal: Negative for nausea and vomiting.   Musculoskeletal: Negative for muscle ache.   Skin: Negative for rash.   Allergic/Immunologic: Negative for seasonal allergies and asthma.   Hematologic/Lymphatic: Negative for swollen lymph nodes.       Objective:      Physical Exam   Constitutional: She is oriented to person, place, and time. She appears well-developed and well-nourished. She is cooperative.  Non-toxic appearance. She does not appear ill. No distress.   HENT:   Head: Normocephalic and " atraumatic.   Right Ear: Hearing, external ear and ear canal normal. Tympanic membrane is not bulging. A middle ear effusion (clear) is present.   Left Ear: Hearing, tympanic membrane, external ear and ear canal normal. Tympanic membrane is not bulging.  No middle ear effusion.   Nose: Rhinorrhea present. No mucosal edema or nasal deformity. No epistaxis. Right sinus exhibits maxillary sinus tenderness. Right sinus exhibits no frontal sinus tenderness. Left sinus exhibits no maxillary sinus tenderness and no frontal sinus tenderness.   Mouth/Throat: Uvula is midline, oropharynx is clear and moist and mucous membranes are normal. No trismus in the jaw. Normal dentition. No uvula swelling. No oropharyngeal exudate or posterior oropharyngeal erythema.   Eyes: Conjunctivae and lids are normal. No scleral icterus.   Sclera clear bilat   Neck: Trachea normal, full passive range of motion without pain and phonation normal. Neck supple.   Cardiovascular: Normal rate, regular rhythm, normal heart sounds, intact distal pulses and normal pulses.   Pulmonary/Chest: Effort normal and breath sounds normal. No accessory muscle usage. No tachypnea. No respiratory distress. She has no decreased breath sounds. She has no wheezes. She has no rhonchi. She has no rales.   Abdominal: Soft. Normal appearance and bowel sounds are normal. She exhibits no distension. There is no tenderness.   Musculoskeletal: Normal range of motion. She exhibits no edema or deformity.   Neurological: She is alert and oriented to person, place, and time. She exhibits normal muscle tone. Coordination normal.   Skin: Skin is warm, dry and intact. She is not diaphoretic. No pallor.   Psychiatric: She has a normal mood and affect. Her speech is normal and behavior is normal. Judgment and thought content normal. Cognition and memory are normal.   Nursing note and vitals reviewed.      Results for orders placed or performed in visit on 04/07/19   POCT Influenza A/B    Result Value Ref Range    Rapid Influenza A Ag Negative Negative    Rapid Influenza B Ag Negative Negative     Acceptable Yes        Assessment:       1. Bacterial sinusitis    2. Cough        Plan:         Bacterial sinusitis  -     POCT Influenza A/B  -     doxycycline (MONODOX) 100 MG capsule; Take 1 capsule (100 mg total) by mouth every 12 (twelve) hours. for 7 days  Dispense: 14 capsule; Refill: 0    Cough  -     benzonatate (TESSALON) 200 MG capsule; Take 1 capsule (200 mg total) by mouth 3 (three) times daily as needed for Cough.  Dispense: 30 capsule; Refill: 0    pt declined chest xray today. Advised f/u for sob    Patient Instructions   PLEASE READ YOUR DISCHARGE INSTRUCTIONS ENTIRELY AS IT CONTAINS IMPORTANT INFORMATION.      Please drink plenty of fluids.    Please get plenty of rest.    Please return here or go to the Emergency Department for any concerns or worsening of condition.    Please take an over the counter antihistamine medication (allegra/Claritin/Zyrtec) of your choice as directed.      If you do have Hypertension or palpitations, it is safe to take Coricidin HBP for relief of sinus symptoms.    If not allergic, please take over the counter Tylenol (Acetaminophen) and/or Motrin (Ibuprofen) as directed for control of pain and/or fever.  Please follow up with your primary care doctor or specialist as needed.    Sore throat recommendations: Warm fluids, warm salt water gargles, throat lozenges, tea, honey, soup, rest, hydration.    Use over the counter flonase: one spray each nostril twice daily OR two sprays each nostril once daily.     If you  smoke, please stop smoking.      Please return or see your primary care doctor if you develop new or worsening symptoms.     Please arrange follow up with your primary medical clinic as soon as possible. You must understand that you've received an Urgent Care treatment only and that you may be released before all of your medical  problems are known or treated. You, the patient, will arrange for follow up as instructed. If your symptoms worsen or fail to improve you should go to the Emergency Room.    Sinusitis (Antibiotic Treatment)    The sinuses are air-filled spaces within the bones of the face. They connect to the inside of the nose. Sinusitis is an inflammation of the tissue lining the sinus cavity. Sinus inflammation can occur during a cold. It can also be due to allergies to pollens and other particles in the air. Sinusitis can cause symptoms of sinus congestion and fullness. A sinus infection causes fever, headache and facial pain. There is often green or yellow drainage from the nose or into the back of the throat (post-nasal drip). You have been given antibiotics to treat this condition.  Home care:  · Take the full course of antibiotics as instructed. Do not stop taking them, even if you feel better.  · Drink plenty of water, hot tea, and other liquids. This may help thin mucus. It also may promote sinus drainage.  · Heat may help soothe painful areas of the face. Use a towel soaked in hot water. Or,  the shower and direct the hot spray onto your face. Using a vaporizer along with a menthol rub at night may also help.   · An expectorant containing guaifenesin may help thin the mucus and promote drainage from the sinuses.  · Over-the-counter decongestants may be used unless a similar medicine was prescribed. Nasal sprays work the fastest. Use one that contains phenylephrine or oxymetazoline. First blow the nose gently. Then use the spray. Do not use these medicines more often than directed on the label or symptoms may get worse. You may also use tablets containing pseudoephedrine. Avoid products that combine ingredients, because side effects may be increased. Read labels. You can also ask the pharmacist for help. (NOTE: Persons with high blood pressure should not use decongestants. They can raise blood  pressure.)  · Over-the-counter antihistamines may help if allergies contributed to your sinusitis.    · Do not use nasal rinses or irrigation during an acute sinus infection, unless told to by your health care provider. Rinsing may spread the infection to other sinuses.  · Use acetaminophen or ibuprofen to control pain, unless another pain medicine was prescribed. (If you have chronic liver or kidney disease or ever had a stomach ulcer, talk with your doctor before using these medicines. Aspirin should never be used in anyone under 18 years of age who is ill with a fever. It may cause severe liver damage.)  · Don't smoke. This can worsen symptoms.  Follow-up care  Follow up with your healthcare provider or our staff if you are not improving within the next week.  When to seek medical advice  Call your healthcare provider if any of these occur:  · Facial pain or headache becoming more severe  · Stiff neck  · Unusual drowsiness or confusion  · Swelling of the forehead or eyelids  · Vision problems, including blurred or double vision  · Fever of 100.4ºF (38ºC) or higher, or as directed by your healthcare provider  · Seizure  · Breathing problems  · Symptoms not resolving within 10 days  Date Last Reviewed: 4/13/2015  © 1198-0388 [employer]. [address] [place] [address], [place] [address]. All rights reserved. This information is not intended as a substitute for professional medical care. Always follow your healthcare professional's instructions.

## 2019-04-07 NOTE — PATIENT INSTRUCTIONS

## 2019-04-09 ENCOUNTER — OFFICE VISIT (OUTPATIENT)
Dept: ORTHOPEDICS | Facility: CLINIC | Age: 69
End: 2019-04-09
Payer: COMMERCIAL

## 2019-04-09 VITALS
WEIGHT: 157.94 LBS | HEART RATE: 81 BPM | HEIGHT: 64 IN | SYSTOLIC BLOOD PRESSURE: 134 MMHG | BODY MASS INDEX: 26.96 KG/M2 | DIASTOLIC BLOOD PRESSURE: 94 MMHG

## 2019-04-09 DIAGNOSIS — M17.0 PRIMARY OSTEOARTHRITIS OF BOTH KNEES: Primary | ICD-10-CM

## 2019-04-09 PROCEDURE — 99999 PR PBB SHADOW E&M-EST. PATIENT-LVL III: ICD-10-PCS | Mod: PBBFAC,,, | Performed by: PHYSICIAN ASSISTANT

## 2019-04-09 PROCEDURE — 20610 PR DRAIN/INJECT LARGE JOINT/BURSA: ICD-10-PCS | Mod: 50,S$GLB,, | Performed by: PHYSICIAN ASSISTANT

## 2019-04-09 PROCEDURE — 99999 PR PBB SHADOW E&M-EST. PATIENT-LVL III: CPT | Mod: PBBFAC,,, | Performed by: PHYSICIAN ASSISTANT

## 2019-04-09 PROCEDURE — 99499 UNLISTED E&M SERVICE: CPT | Mod: S$GLB,,, | Performed by: PHYSICIAN ASSISTANT

## 2019-04-09 PROCEDURE — 20610 DRAIN/INJ JOINT/BURSA W/O US: CPT | Mod: 50,S$GLB,, | Performed by: PHYSICIAN ASSISTANT

## 2019-04-09 PROCEDURE — 99499 NO LOS: ICD-10-PCS | Mod: S$GLB,,, | Performed by: PHYSICIAN ASSISTANT

## 2019-04-09 NOTE — PROGRESS NOTES
Jessica Floyd is a 68 y.o. year old her here today for her 2nd Euflexxa injection for degenerative changes of her bilateral knee . she was last seen and treated in the clinic on 4/2/2019. There has been no significant change in her medical status since her last visit. No Fever, chills, malaise, or unexplained weight change.      Allergies, Medications, past medical and surgical history were reviewed .    Examination of the knee demonstrates  No evidence of edema, erythema , echymosis strength and range of motion are unchanged from previous visit.    The injection site was identified and the skin was prepared with a betadine solution. The  bilateral knee  joint was injected with 2 ml of Euflexxa solution under sterile technique. Jessica Floyd tolerated the procedure well, she was advised to rest the knee today, ice and elevation. I may take 3 -6 weeks following the last injection to get the full benefit of the medication.  I will see her back in 1 week. Sooner if he has any problems or concerns.           .     ICD-10-CM ICD-9-CM   1. Primary osteoarthritis of both knees M17.0 715.16

## 2019-04-16 ENCOUNTER — OFFICE VISIT (OUTPATIENT)
Dept: ORTHOPEDICS | Facility: CLINIC | Age: 69
End: 2019-04-16
Payer: COMMERCIAL

## 2019-04-16 VITALS
SYSTOLIC BLOOD PRESSURE: 144 MMHG | HEART RATE: 80 BPM | HEIGHT: 64 IN | DIASTOLIC BLOOD PRESSURE: 82 MMHG | BODY MASS INDEX: 26.84 KG/M2 | WEIGHT: 157.19 LBS

## 2019-04-16 DIAGNOSIS — M17.0 PRIMARY OSTEOARTHRITIS OF BOTH KNEES: Primary | ICD-10-CM

## 2019-04-16 PROCEDURE — 99999 PR PBB SHADOW E&M-EST. PATIENT-LVL III: CPT | Mod: PBBFAC,,, | Performed by: PHYSICIAN ASSISTANT

## 2019-04-16 PROCEDURE — 99499 UNLISTED E&M SERVICE: CPT | Mod: S$GLB,,, | Performed by: PHYSICIAN ASSISTANT

## 2019-04-16 PROCEDURE — 99499 NO LOS: ICD-10-PCS | Mod: S$GLB,,, | Performed by: PHYSICIAN ASSISTANT

## 2019-04-16 PROCEDURE — 20610 DRAIN/INJ JOINT/BURSA W/O US: CPT | Mod: 50,S$GLB,, | Performed by: PHYSICIAN ASSISTANT

## 2019-04-16 PROCEDURE — 20610 PR DRAIN/INJECT LARGE JOINT/BURSA: ICD-10-PCS | Mod: 50,S$GLB,, | Performed by: PHYSICIAN ASSISTANT

## 2019-04-16 PROCEDURE — 99999 PR PBB SHADOW E&M-EST. PATIENT-LVL III: ICD-10-PCS | Mod: PBBFAC,,, | Performed by: PHYSICIAN ASSISTANT

## 2019-04-16 NOTE — PROGRESS NOTES
Jessica Floyd is a 68 y.o. year old her here today for her 3rd Euflexxa injection for degenerative changes of her bilateral knee . she was last seen and treated in the clinic on 4/9/2019. There has been no significant change in her medical status since her last visit. No Fever, chills, malaise, or unexplained weight change.      Allergies, Medications, past medical and surgical history were reviewed .    Examination of the knee demonstrates  No evidence of edema, erythema , echymosis strength and range of motion are unchanged from previous visit.    The injection site was identified and the skin was prepared with a betadine solution. The  bilateral knee  joint was injected with 2 ml of Euflexxa solution under sterile technique. Jessica Floyd tolerated the procedure well, she was advised to rest the knee today, ice and elevation. I may take 3 -6 weeks following the last injection to get the full benefit of the medication.  I will see her back in 4-6 months. Sooner if he has any problems or concerns.           .     ICD-10-CM ICD-9-CM   1. Primary osteoarthritis of both knees M17.0 715.16

## 2019-05-10 NOTE — PATIENT INSTRUCTIONS
I have given you an [facility] doctor referral. If you don't hear from them in a few days call [phone]  You should get your eye rechecked on Monday. You may have to call the referral number to get an appt however.   If your eye gets worse over the weekend you need to go to the emergency room  Corneal Abrasion    You have received a scratch or scrape (abrasion) to your cornea. The cornea is the clear part in the front of the eye. This sensitive area is very painful when injured. You may make tears frequently, and your vision may be blurry until the injury heals. You may be sensitive to light.  This part of the body heals quickly. You can expect the pain to go away within 24 to 48 hours. If the abrasion is large or deep, your doctor may apply an eye patch, although this is not always done. An antibiotic ointment or eye drops may also be used to prevent infection.  Numbing drops may be used to relieve the pain temporarily so that your eyes can be examined. However, these drops cannot be prescribed for home use because that would prevent healing and lead to more serious problems. Also, if you cant feel your eye, there is a chance of accidentally injuring it further without knowing it.  Home care  · A cold pack (ice in a plastic bag, wrapped in a towel) may be applied over the eye (or eye patch) for 20 minutes at a time, to reduce pain.  · You may use acetaminophen or ibuprofen to control pain, unless another pain medicine was prescribed. Note: If you have chronic liver or kidney disease or ever had a stomach ulcer or GI bleeding, talk with your doctor before using these medicines.  · Rest your eyes and do not read until symptoms are gone.  · If you use contact lenses, do not wear them until all symptoms are gone.  · If your vision is affected by the corneal abrasion or if an eye patch was applied, do not drive a motor vehicle or operate machinery until all symptoms are gone. You may have trouble judging distances  using only one eye.  · If your eyes are sensitive to light, try wearing sunglasses, or stay indoors until symptoms go away.  Follow-up care  Follow up with your health care provider, or as advised.  · If no patch was put on your eye, and used but the pain continues for more than 48 hours, you should have another exam. Return to this facility or contact your health care provider to arrange this.  · If your eye was patched and you were asked to remove the patch yourself, see your health care provider. You may also return to this facility if you still have pain after the patch is removed.  · If you were given a return appointment for patch removal and re-examination, be sure to keep the appointment. Leaving the patch in place longer than advised could be harmful.  When to seek medical advice  Call your health care provider right away if any of these occur.  · Increasing eye pain or pain that does not improve after 24 hours  · Discharge from the eye  · Increasing redness of the eye or swelling of the eyelids  · Worsening vision  · Symptoms that worsen after the abrasion has healed  Date Last Reviewed: 6/14/2015  © 6764-5202 [employer]. [address] [place] [address], [place] [address]. All rights reserved. This information is not intended as a substitute for professional medical care. Always follow your healthcare professional's instructions.         independent

## 2019-05-12 DIAGNOSIS — J06.9 VIRAL UPPER RESPIRATORY TRACT INFECTION: ICD-10-CM

## 2019-05-13 RX ORDER — FLUTICASONE PROPIONATE 50 MCG
SPRAY, SUSPENSION (ML) NASAL
Qty: 16 ML | Refills: 3 | Status: SHIPPED | OUTPATIENT
Start: 2019-05-13 | End: 2023-11-14 | Stop reason: SDUPTHER

## 2019-06-04 RX ORDER — CARVEDILOL 12.5 MG/1
12.5 TABLET ORAL 2 TIMES DAILY WITH MEALS
Qty: 180 TABLET | Refills: 0 | Status: SHIPPED | OUTPATIENT
Start: 2019-06-04 | End: 2019-08-20 | Stop reason: SDUPTHER

## 2019-07-05 RX ORDER — HYDROCHLOROTHIAZIDE 25 MG/1
TABLET ORAL
Qty: 90 TABLET | Refills: 3 | Status: SHIPPED | OUTPATIENT
Start: 2019-07-05 | End: 2020-05-28

## 2019-07-23 RX ORDER — AMLODIPINE BESYLATE 10 MG/1
TABLET ORAL
Qty: 90 TABLET | Refills: 3 | Status: SHIPPED | OUTPATIENT
Start: 2019-07-23 | End: 2020-08-28

## 2019-08-20 RX ORDER — CARVEDILOL 12.5 MG/1
TABLET ORAL
Qty: 180 TABLET | Refills: 0 | Status: SHIPPED | OUTPATIENT
Start: 2019-08-20 | End: 2019-11-13 | Stop reason: SDUPTHER

## 2019-11-13 DIAGNOSIS — E78.5 DYSLIPIDEMIA: ICD-10-CM

## 2019-11-13 RX ORDER — CARVEDILOL 12.5 MG/1
TABLET ORAL
Qty: 180 TABLET | Refills: 0 | Status: SHIPPED | OUTPATIENT
Start: 2019-11-13 | End: 2020-01-30

## 2019-11-13 RX ORDER — ATORVASTATIN CALCIUM 80 MG/1
TABLET, FILM COATED ORAL
Qty: 90 TABLET | Refills: 3 | Status: SHIPPED | OUTPATIENT
Start: 2019-11-13 | End: 2021-01-13 | Stop reason: SDUPTHER

## 2019-12-19 DIAGNOSIS — K21.9 GASTROESOPHAGEAL REFLUX DISEASE WITHOUT ESOPHAGITIS: ICD-10-CM

## 2019-12-19 RX ORDER — PANTOPRAZOLE SODIUM 40 MG/1
TABLET, DELAYED RELEASE ORAL
Qty: 90 TABLET | Refills: 3 | Status: SHIPPED | OUTPATIENT
Start: 2019-12-19 | End: 2020-11-30

## 2020-01-30 RX ORDER — CARVEDILOL 12.5 MG/1
TABLET ORAL
Qty: 180 TABLET | Refills: 0 | Status: SHIPPED | OUTPATIENT
Start: 2020-01-30 | End: 2020-03-31

## 2020-02-19 ENCOUNTER — OFFICE VISIT (OUTPATIENT)
Dept: PRIMARY CARE CLINIC | Facility: CLINIC | Age: 70
End: 2020-02-19
Attending: FAMILY MEDICINE
Payer: COMMERCIAL

## 2020-02-19 VITALS
BODY MASS INDEX: 26.42 KG/M2 | WEIGHT: 154.75 LBS | SYSTOLIC BLOOD PRESSURE: 100 MMHG | TEMPERATURE: 98 F | HEART RATE: 65 BPM | DIASTOLIC BLOOD PRESSURE: 64 MMHG | HEIGHT: 64 IN

## 2020-02-19 DIAGNOSIS — B96.89 ACUTE BACTERIAL SINUSITIS: Primary | ICD-10-CM

## 2020-02-19 DIAGNOSIS — J01.90 ACUTE BACTERIAL SINUSITIS: Primary | ICD-10-CM

## 2020-02-19 DIAGNOSIS — E05.90 SUBCLINICAL HYPERTHYROIDISM: ICD-10-CM

## 2020-02-19 DIAGNOSIS — I10 ESSENTIAL HYPERTENSION: ICD-10-CM

## 2020-02-19 PROCEDURE — 99213 OFFICE O/P EST LOW 20 MIN: CPT | Mod: S$GLB,,, | Performed by: FAMILY MEDICINE

## 2020-02-19 PROCEDURE — 99213 PR OFFICE/OUTPT VISIT, EST, LEVL III, 20-29 MIN: ICD-10-PCS | Mod: S$GLB,,, | Performed by: FAMILY MEDICINE

## 2020-02-19 PROCEDURE — 99999 PR PBB SHADOW E&M-EST. PATIENT-LVL III: ICD-10-PCS | Mod: PBBFAC,,, | Performed by: FAMILY MEDICINE

## 2020-02-19 PROCEDURE — 99999 PR PBB SHADOW E&M-EST. PATIENT-LVL III: CPT | Mod: PBBFAC,,, | Performed by: FAMILY MEDICINE

## 2020-02-19 RX ORDER — BENZONATATE 100 MG/1
100 CAPSULE ORAL 3 TIMES DAILY PRN
Qty: 30 CAPSULE | Refills: 0 | Status: SHIPPED | OUTPATIENT
Start: 2020-02-19 | End: 2020-02-29

## 2020-02-19 RX ORDER — AZITHROMYCIN 250 MG/1
TABLET, FILM COATED ORAL
Qty: 6 TABLET | Refills: 0 | Status: SHIPPED | OUTPATIENT
Start: 2020-02-19 | End: 2020-02-24

## 2020-03-10 NOTE — PROGRESS NOTES
"Subjective:       Patient ID: Jessica Floyd is a 69 y.o. female.    Vitals:  height is 5' 4" (1.626 m) and weight is 70.2 kg (154 lb 12.2 oz). Her temperature is 98.2 °F (36.8 °C). Her blood pressure is 100/64 and her pulse is 65.     Chief Complaint: Nasal Congestion    Patient presents with c/o cough and sinus congestion x 2 weeks. No recent travel or sick contacts exc is around grandchildren   Patient states the cough is a dry hacking cough - no sob. Patient goes back in forth between chills and fever. Notes fever of 101.0 F.     Cough   This is a new problem. Episode onset: thursday. The problem has been unchanged. The problem occurs every few minutes. The cough is productive of sputum. Associated symptoms include a fever and a sore throat. Pertinent negatives include no chills, ear pain, eye redness, hemoptysis, myalgias, rash, shortness of breath or wheezing. Treatments tried: dayquil. The treatment provided mild relief. Her past medical history is significant for bronchitis and pneumonia.       Constitution: Positive for fever. Negative for chills, sweating and fatigue.   HENT: Positive for congestion, sinus pressure, sore throat and voice change. Negative for ear pain and sinus pain.    Neck: Negative for painful lymph nodes.   Eyes: Negative for eye redness.   Respiratory: Positive for cough and sputum production. Negative for chest tightness, bloody sputum, COPD, shortness of breath, stridor, wheezing and asthma.    Gastrointestinal: Negative for nausea and vomiting.   Musculoskeletal: Negative for muscle ache.   Skin: Negative for rash.   Allergic/Immunologic: Negative for seasonal allergies and asthma.   Hematologic/Lymphatic: Negative for swollen lymph nodes.       Objective:      Physical Exam   Constitutional: She is oriented to person, place, and time. She appears well-developed and well-nourished. She is cooperative.  Non-toxic appearance. She does not appear ill. No distress.   HENT:   Head: " Normocephalic and atraumatic.   Right Ear: Hearing, external ear and ear canal normal. Tympanic membrane is bulging. A middle ear effusion (clear) is present.   Left Ear: Hearing, external ear and ear canal normal. Tympanic membrane is bulging. A middle ear effusion is present.   Nose: Rhinorrhea present. No mucosal edema or nasal deformity. No epistaxis. Right sinus exhibits maxillary sinus tenderness. Right sinus exhibits no frontal sinus tenderness. Left sinus exhibits no maxillary sinus tenderness and no frontal sinus tenderness.   Mouth/Throat: Uvula is midline and mucous membranes are normal. No trismus in the jaw. Normal dentition. No uvula swelling. Posterior oropharyngeal edema and posterior oropharyngeal erythema present. No oropharyngeal exudate or tonsillar abscesses.   Eyes: Conjunctivae and lids are normal. No scleral icterus.   Sclera clear bilat   Neck: Trachea normal, full passive range of motion without pain and phonation normal. Neck supple.   Cardiovascular: Normal rate, regular rhythm, normal heart sounds, intact distal pulses and normal pulses.   Pulmonary/Chest: Effort normal and breath sounds normal. No accessory muscle usage. No tachypnea. No respiratory distress. She has no decreased breath sounds. She has no wheezes. She has no rhonchi. She has no rales.   Abdominal: Soft. Normal appearance and bowel sounds are normal. She exhibits no distension. There is no tenderness.   Musculoskeletal: Normal range of motion. She exhibits no edema or deformity.   Neurological: She is alert and oriented to person, place, and time. She exhibits normal muscle tone. Coordination normal.   Skin: Skin is warm, dry and intact. She is not diaphoretic. No pallor.   Psychiatric: She has a normal mood and affect. Her speech is normal and behavior is normal. Judgment and thought content normal. Cognition and memory are normal.   Nursing note and vitals reviewed.      Results for orders placed or performed in visit  on 04/07/19   POCT Influenza A/B   Result Value Ref Range    Rapid Influenza A Ag Negative Negative    Rapid Influenza B Ag Negative Negative     Acceptable Yes        Assessment:       1. Essential hypertension    2. Subclinical hyperthyroidism    3. Acute bacterial sinusitis        Plan:         Essential hypertension    Subclinical hyperthyroidism    Acute bacterial sinusitis  -     azithromycin (Z-LUCILLE) 250 MG tablet; Take 2 tablets by mouth on day 1; Take 1 tablet by mouth on days 2-5  Dispense: 6 tablet; Refill: 0    Other orders  -     benzonatate (TESSALON) 100 MG capsule; Take 1 capsule (100 mg total) by mouth 3 (three) times daily as needed for Cough.  Dispense: 30 capsule; Refill: 0      RTC prn symptoms   ED prompts d/w pt

## 2020-03-31 RX ORDER — CARVEDILOL 12.5 MG/1
TABLET ORAL
Qty: 180 TABLET | Refills: 0 | Status: SHIPPED | OUTPATIENT
Start: 2020-03-31 | End: 2020-06-18

## 2020-05-28 RX ORDER — HYDROCHLOROTHIAZIDE 25 MG/1
TABLET ORAL
Qty: 90 TABLET | Refills: 3 | Status: SHIPPED | OUTPATIENT
Start: 2020-05-28 | End: 2021-02-19 | Stop reason: SDUPTHER

## 2020-08-28 ENCOUNTER — OFFICE VISIT (OUTPATIENT)
Dept: URGENT CARE | Facility: CLINIC | Age: 70
End: 2020-08-28
Payer: COMMERCIAL

## 2020-08-28 VITALS
OXYGEN SATURATION: 95 % | SYSTOLIC BLOOD PRESSURE: 141 MMHG | TEMPERATURE: 99 F | HEART RATE: 82 BPM | DIASTOLIC BLOOD PRESSURE: 81 MMHG

## 2020-08-28 DIAGNOSIS — L03.011 CELLULITIS OF FINGER OF RIGHT HAND: Primary | ICD-10-CM

## 2020-08-28 PROCEDURE — 99214 OFFICE O/P EST MOD 30 MIN: CPT | Mod: S$GLB,,, | Performed by: FAMILY MEDICINE

## 2020-08-28 PROCEDURE — 99214 PR OFFICE/OUTPT VISIT, EST, LEVL IV, 30-39 MIN: ICD-10-PCS | Mod: S$GLB,,, | Performed by: FAMILY MEDICINE

## 2020-08-28 RX ORDER — AMOXICILLIN 500 MG/1
CAPSULE ORAL
COMMUNITY
Start: 2020-08-26 | End: 2020-12-30

## 2020-08-28 RX ORDER — SULFAMETHOXAZOLE AND TRIMETHOPRIM 800; 160 MG/1; MG/1
1 TABLET ORAL 2 TIMES DAILY
Qty: 14 TABLET | Refills: 0 | Status: SHIPPED | OUTPATIENT
Start: 2020-08-28 | End: 2020-09-04

## 2020-08-28 RX ORDER — MUPIROCIN 20 MG/G
OINTMENT TOPICAL
Qty: 22 G | Refills: 1 | Status: SHIPPED | OUTPATIENT
Start: 2020-08-28 | End: 2020-12-30

## 2020-08-28 NOTE — PROGRESS NOTES
Subjective:       Patient ID: Jessica Floyd is a 70 y.o. female.    Vitals:  temperature is 99 °F (37.2 °C). Her blood pressure is 141/81 (abnormal) and her pulse is 82. Her oxygen saturation is 95%.     Chief Complaint: Rash (inflammation)    Pt presents with c o inflammed skin on #3 digit of R hand x 5 days. Pt states she got poked by something while gardening. Pt states she soaked finger in hot water & peroxide this morning but found no relief.. tetanus is UTD    Rash  Episode onset: 5 days. The affected locations include the right hand. The rash is characterized by redness, swelling and pain. She was exposed to plant contact. Pertinent negatives include no cough, fever, nail changes, shortness of breath or sore throat. Treatments tried: hot water, peroxide. The treatment provided no relief. There is no history of allergies, asthma or eczema. (Skin cancer)       Constitution: Negative for chills and fever.   HENT: Negative for facial swelling and sore throat.    Neck: Negative for painful lymph nodes.   Eyes: Negative for eye itching and eyelid swelling.   Respiratory: Negative for cough and shortness of breath.    Musculoskeletal: Negative for joint pain.   Skin: Positive for color change, rash and erythema. Negative for pale, wound, abrasion, laceration, lesion, skin thickening/induration, puncture wound, bruising, abscess, avulsion and hives.   Allergic/Immunologic: Negative for environmental allergies, immunocompromised state and hives.   Hematologic/Lymphatic: Negative for swollen lymph nodes.       Objective:      Physical Exam   Eyes: Scleral icterus: bactr.   Abdominal: Normal appearance.   Neurological: She is alert.   Skin: Skin is warm and dry. Lesions:  erythema        Comments: Marked erythema and induration of rt ring finger from the DIP on down to the nail bed -worse around the pinky side of the nail, there appears to be fluctuance in the skin which is tense and red and tender about the nail. I  offered to do a digital block vs puncture around the nail. Pt opted to let me puncture around the nail bed and about the area of induration and fluctuance. Several areas were punctured with some serosanguinous fluid drained from each region. No significant pus.     Nursing note and vitals reviewed.        Assessment:       1. Cellulitis of finger of right hand        Plan:         Cellulitis of finger of right hand  -     sulfamethoxazole-trimethoprim 800-160mg (BACTRIM DS) 800-160 mg Tab; Take 1 tablet by mouth 2 (two) times daily. for 7 days  Dispense: 14 tablet; Refill: 0  -     mupirocin (BACTROBAN) 2 % ointment; Apply to affected area 3 times daily  Dispense: 22 g; Refill: 1    I did try and drain the lesion by puncturing (as above) with a sterile needle but only a small amount of fluid was removed    rtc if not better in a few days  Patient Instructions     Paronychia of the Finger or Toe  Paronychia is an infection near a fingernail or toenail. It usually occurs when an opening in the cuticle or an ingrown toenail lets bacteria under the skin.  The infection will need to be drained if pus is present. If the infection has been caught early, you may need only antibiotic treatment. Healing will take about 1 to 2 weeks.  Home care  Follow these guidelines when caring for yourself at home:  · Clean and soak the toe or finger. Do this 2 times a day for the first 3 days. To do so:  ¨ Soak your foot or hand in a tub of warm water for 5 minutes. Or hold your toe or finger under a faucet of warm running water for 5 minutes.  ¨ Clean any crust away with soap and water using a cotton swab.  ¨ Put antibiotic ointment on the infected area.  · Change the dressing daily or any time it gets dirty.  · If you were given antibiotics, take them as directed until they are all gone.  · If your infection is on a toe, wear comfortable shoes with a lot of toe room. You can also wear open-toed sandals while your toe heals.  · You may  use over-the-counter medicine (acetaminophen or ibuprofen to help with pain, unless another medicine was prescribed. If you have chronic liver or kidney disease, talk with your healthcare provider before using these medicines. Also talk with your provider if you've had a stomach ulcer or GI (gastrointestinal) bleeding.  Prevention  The following can prevent paronychia:  · Avoid cutting or playing with your cuticles at home.  · Don't bite your nails.  · Don't suck on your thumbs or fingers.  Follow-up care  Follow up with your healthcare provider, or as advised.  When to seek medical advice  Call your healthcare provider right away if any of these occur:  · Redness, pain, or swelling of the finger or toe gets worse  · Red streaks in the skin leading away from the wound  · Pus or fluid draining from the nail area  · Fever of 100.4ºF (38ºC) or higher, or as directed by your provider  Date Last Reviewed: 8/1/2016  © 9348-5804 [employer]. [address] [place] [address]. All rights reserved. This information is not intended as a substitute for professional medical care. Always follow your healthcare professional's instructions.        Cellulitis  Cellulitis is an infection of the deep layers of skin. A break in the skin, such as a cut or scratch, can let bacteria under the skin. If the bacteria get to deep layers of the skin, it can be serious. If not treated, cellulitis can get into the bloodstream and lymph nodes. The infection can then spread throughout the body. This causes serious illness.  Cellulitis causes the affected skin to become red, swollen, warm, and sore. The reddened areas have a visible border. An open sore may leak fluid (pus). You may have a fever, chills, and pain.  Cellulitis is treated with antibiotics taken for 7 to 10 days. An open sore may be cleaned and covered with cool wet gauze. Symptoms should get better 1 to 2 days after treatment is started. Make sure to take all  the antibiotics for the full number of days until they are gone. Keep taking the medicine even if your symptoms go away.  Home care  Follow these tips:  · Limit the use of the part of your body with cellulitis.   · If the infection is on your leg, keep your leg raised while sitting. This will help to reduce swelling.  · Take all of the antibiotic medicine exactly as directed until it is gone. Do not miss any doses, especially during the first 7 days. Dont stop taking the medicine when your symptoms get better.  · Keep the affected area clean and dry.  · Wash your hands with soap and warm water before and after touching your skin. Anyone else who touches your skin should also wash his or her hands. Don't share towels.  Follow-up care  Follow up with your healthcare provider, or as advised. If your infection does not go away on the first antibiotic, your healthcare provider will prescribe a different one.  When to seek medical advice  Call your healthcare provider right away if any of these occur:  · Red areas that spread  · Swelling or pain that gets worse  · Fluid leaking from the skin (pus)  · Fever higher of 100.4º F (38.0º C) or higher after 2 days on antibiotics  Date Last Reviewed: 9/1/2016  © 7127-9150 The [employer]. [address] [place] [address], [place] [address]. All rights reserved. This information is not intended as a substitute for professional medical care. Always follow your healthcare professional's instructions.

## 2020-08-28 NOTE — PATIENT INSTRUCTIONS
Paronychia of the Finger or Toe  Paronychia is an infection near a fingernail or toenail. It usually occurs when an opening in the cuticle or an ingrown toenail lets bacteria under the skin.  The infection will need to be drained if pus is present. If the infection has been caught early, you may need only antibiotic treatment. Healing will take about 1 to 2 weeks.  Home care  Follow these guidelines when caring for yourself at home:  · Clean and soak the toe or finger. Do this 2 times a day for the first 3 days. To do so:  ¨ Soak your foot or hand in a tub of warm water for 5 minutes. Or hold your toe or finger under a faucet of warm running water for 5 minutes.  ¨ Clean any crust away with soap and water using a cotton swab.  ¨ Put antibiotic ointment on the infected area.  · Change the dressing daily or any time it gets dirty.  · If you were given antibiotics, take them as directed until they are all gone.  · If your infection is on a toe, wear comfortable shoes with a lot of toe room. You can also wear open-toed sandals while your toe heals.  · You may use over-the-counter medicine (acetaminophen or ibuprofen to help with pain, unless another medicine was prescribed. If you have chronic liver or kidney disease, talk with your healthcare provider before using these medicines. Also talk with your provider if you've had a stomach ulcer or GI (gastrointestinal) bleeding.  Prevention  The following can prevent paronychia:  · Avoid cutting or playing with your cuticles at home.  · Don't bite your nails.  · Don't suck on your thumbs or fingers.  Follow-up care  Follow up with your healthcare provider, or as advised.  When to seek medical advice  Call your healthcare provider right away if any of these occur:  · Redness, pain, or swelling of the finger or toe gets worse  · Red streaks in the skin leading away from the wound  · Pus or fluid draining from the nail area  · Fever of 100.4ºF (38ºC) or higher, or as directed  by your provider  Date Last Reviewed: 8/1/2016 © 2000-2017 [employer]. [address] [place] [address], [place] [address]. All rights reserved. This information is not intended as a substitute for professional medical care. Always follow your healthcare professional's instructions.        Cellulitis  Cellulitis is an infection of the deep layers of skin. A break in the skin, such as a cut or scratch, can let bacteria under the skin. If the bacteria get to deep layers of the skin, it can be serious. If not treated, cellulitis can get into the bloodstream and lymph nodes. The infection can then spread throughout the body. This causes serious illness.  Cellulitis causes the affected skin to become red, swollen, warm, and sore. The reddened areas have a visible border. An open sore may leak fluid (pus). You may have a fever, chills, and pain.  Cellulitis is treated with antibiotics taken for 7 to 10 days. An open sore may be cleaned and covered with cool wet gauze. Symptoms should get better 1 to 2 days after treatment is started. Make sure to take all the antibiotics for the full number of days until they are gone. Keep taking the medicine even if your symptoms go away.  Home care  Follow these tips:  · Limit the use of the part of your body with cellulitis.   · If the infection is on your leg, keep your leg raised while sitting. This will help to reduce swelling.  · Take all of the antibiotic medicine exactly as directed until it is gone. Do not miss any doses, especially during the first 7 days. Dont stop taking the medicine when your symptoms get better.  · Keep the affected area clean and dry.  · Wash your hands with soap and warm water before and after touching your skin. Anyone else who touches your skin should also wash his or her hands. Don't share towels.  Follow-up care  Follow up with your healthcare provider, or as advised. If your infection does not go away on the first antibiotic, your healthcare  provider will prescribe a different one.  When to seek medical advice  Call your healthcare provider right away if any of these occur:  · Red areas that spread  · Swelling or pain that gets worse  · Fluid leaking from the skin (pus)  · Fever higher of 100.4º F (38.0º C) or higher after 2 days on antibiotics  Date Last Reviewed: 9/1/2016  © 2192-7123 [employer]. [address] [place] [address], [place] [address]. All rights reserved. This information is not intended as a substitute for professional medical care. Always follow your healthcare professional's instructions.

## 2020-12-29 ENCOUNTER — TELEPHONE (OUTPATIENT)
Dept: PRIMARY CARE CLINIC | Facility: CLINIC | Age: 70
End: 2020-12-29

## 2020-12-29 NOTE — TELEPHONE ENCOUNTER
----- Message from Alyssia Oates sent at 12/29/2020  1:35 PM CST -----  Contact: [phone]  Pt calling to schedule an appointment sooner than 1/27 for an annual visit and she's having foot issues. Please call the pt regarding the appointment.

## 2020-12-30 ENCOUNTER — OFFICE VISIT (OUTPATIENT)
Dept: URGENT CARE | Facility: CLINIC | Age: 70
End: 2020-12-30
Payer: COMMERCIAL

## 2020-12-30 ENCOUNTER — TELEPHONE (OUTPATIENT)
Dept: PRIMARY CARE CLINIC | Facility: CLINIC | Age: 70
End: 2020-12-30

## 2020-12-30 VITALS
BODY MASS INDEX: 25.61 KG/M2 | WEIGHT: 150 LBS | HEIGHT: 64 IN | HEART RATE: 89 BPM | TEMPERATURE: 98 F | SYSTOLIC BLOOD PRESSURE: 118 MMHG | OXYGEN SATURATION: 95 % | DIASTOLIC BLOOD PRESSURE: 76 MMHG

## 2020-12-30 DIAGNOSIS — S93.401A SPRAIN OF RIGHT ANKLE, UNSPECIFIED LIGAMENT, INITIAL ENCOUNTER: Primary | ICD-10-CM

## 2020-12-30 DIAGNOSIS — S93.601A SPRAIN OF RIGHT FOOT, INITIAL ENCOUNTER: ICD-10-CM

## 2020-12-30 PROCEDURE — 73630 XR FOOT COMPLETE 3 VIEW RIGHT: ICD-10-PCS | Mod: RT,S$GLB,, | Performed by: RADIOLOGY

## 2020-12-30 PROCEDURE — 99214 OFFICE O/P EST MOD 30 MIN: CPT | Mod: S$GLB,,, | Performed by: NURSE PRACTITIONER

## 2020-12-30 PROCEDURE — 73630 X-RAY EXAM OF FOOT: CPT | Mod: RT,S$GLB,, | Performed by: RADIOLOGY

## 2020-12-30 PROCEDURE — 73610 X-RAY EXAM OF ANKLE: CPT | Mod: RT,S$GLB,, | Performed by: RADIOLOGY

## 2020-12-30 PROCEDURE — 73610 XR ANKLE COMPLETE 3 VIEW RIGHT: ICD-10-PCS | Mod: RT,S$GLB,, | Performed by: RADIOLOGY

## 2020-12-30 PROCEDURE — 99214 PR OFFICE/OUTPT VISIT, EST, LEVL IV, 30-39 MIN: ICD-10-PCS | Mod: S$GLB,,, | Performed by: NURSE PRACTITIONER

## 2020-12-30 RX ORDER — IBUPROFEN 600 MG/1
600 TABLET ORAL EVERY 6 HOURS PRN
Qty: 30 TABLET | Refills: 0 | Status: SHIPPED | OUTPATIENT
Start: 2020-12-30 | End: 2021-01-11

## 2020-12-30 NOTE — PROGRESS NOTES
"Subjective:       Patient ID: Jessica Floyd is a 70 y.o. female.    Vitals:  height is 5' 4" (1.626 m) and weight is 68 kg (150 lb). Her temperature is 97.7 °F (36.5 °C). Her blood pressure is 118/76 and her pulse is 89. Her oxygen saturation is 95%.     Chief Complaint: Ankle Injury    States on 12.23.2020 she was putting her trash out when the neighbors dog got out and she slipped into a pot hole by the trashcan. Has been using RICE therapy with minimal relief. Unable to bear weight on the foot.    Ankle Injury   The incident occurred 5 to 7 days ago (last Wednesday). The incident occurred in the street. Injury mechanism: slipped and fell in hole. The pain is present in the right foot and right ankle. The quality of the pain is described as aching, burning, shooting and stabbing. The pain is at a severity of 8/10. The pain is severe. Associated symptoms include an inability to bear weight. Pertinent negatives include no loss of motion, loss of sensation, muscle weakness, numbness or tingling. She reports no foreign bodies present. The symptoms are aggravated by weight bearing, palpation and movement. She has tried acetaminophen, elevation, ice, NSAIDs and non-weight bearing for the symptoms. The treatment provided mild relief.       Constitution: Negative for fatigue.   HENT: Negative for facial swelling and facial trauma.    Neck: Negative for neck stiffness.   Cardiovascular: Negative for chest trauma.   Eyes: Negative for eye trauma, double vision and blurred vision.   Gastrointestinal: Negative for abdominal trauma, abdominal pain and rectal bleeding.   Genitourinary: Negative for hematuria, missed menses, genital trauma and pelvic pain.   Musculoskeletal: Positive for pain, trauma, joint pain, joint swelling and abnormal ROM of joint.   Skin: Negative for color change, wound, abrasion, laceration, erythema and bruising.   Neurological: Negative for dizziness, history of vertigo, light-headedness, coordination " disturbances, altered mental status, loss of consciousness and numbness.   Hematologic/Lymphatic: Negative for history of bleeding disorder.   Psychiatric/Behavioral: Negative for altered mental status.       Objective:      Physical Exam   Constitutional: She is oriented to person, place, and time. She appears well-developed.   HENT:   Head: Normocephalic and atraumatic. Head is without abrasion, without contusion and without laceration.   Ears:   Right Ear: External ear normal.   Left Ear: External ear normal.   Nose: Nose normal.   Mouth/Throat: Oropharynx is clear and moist and mucous membranes are normal.   Eyes: Pupils are equal, round, and reactive to light. Conjunctivae, EOM and lids are normal.   Neck: Trachea normal, full passive range of motion without pain and phonation normal. Neck supple.   Cardiovascular: Normal rate and normal pulses.   Pulses:       Dorsalis pedis pulses are 2+ on the right side.        Posterior tibial pulses are 2+ on the right side.   Pulmonary/Chest: Effort normal. No stridor. No respiratory distress.   Musculoskeletal:         General: Swelling, tenderness and signs of injury present.      Right ankle: She exhibits decreased range of motion and swelling. Tenderness. Lateral malleolus tenderness found.      Right foot: Tenderness and swelling present.        Feet:    Neurological: She is alert and oriented to person, place, and time.   Skin: Skin is warm, dry, intact and no rash. Capillary refill takes less than 2 seconds. abrasion, burn, bruising, erythema and ecchymosisPsychiatric: Her speech is normal and behavior is normal. Judgment and thought content normal.   Nursing note and vitals reviewed.      X-ray Ankle Complete 3 View Right    Result Date: 12/30/2020  EXAMINATION: XR ANKLE COMPLETE 3 VIEW RIGHT CLINICAL HISTORY: Pain in right ankle and joints of right foot FINDINGS: Three views right: There is soft tissue swelling.  No fracture dislocation bone destruction seen.   No trauma seen.  There is mild DJD.  No OCD seen. Electronically signed by: Duc Jimenez MD Date:    12/30/2020 Time:    14:30    X-ray Foot Complete 3 View Right    Result Date: 12/30/2020  EXAMINATION: XR FOOT COMPLETE 3 VIEW RIGHT CLINICAL HISTORY: Other specified soft tissue disorders FINDINGS: Three views right foot: There is remote deformity of the 5th metatarsal.  There is a hallux valgus deformity and there is DJD.  No acute fracture dislocation bone destruction seen. Electronically signed by: Duc Jimenez MD Date:    12/30/2020 Time:    14:32    Assessment:       1. Sprain of right ankle, unspecified ligament, initial encounter    2. Sprain of right foot, initial encounter        Plan:         Sprain of right ankle, unspecified ligament, initial encounter  -     X-Ray Ankle Complete 3 View Right; Future; Expected date: 12/30/2020  -     Ambulatory referral/consult to Orthopedics  -     NON-PNEUMATIC WALKING BOOT FOR HOME USE    Sprain of right foot, initial encounter  -     X-Ray Foot Complete 3 view Right; Future; Expected date: 12/30/2020  -     Ambulatory referral/consult to Orthopedics  -     NON-PNEUMATIC WALKING BOOT FOR HOME USE    Other orders  -     ibuprofen (ADVIL,MOTRIN) 600 MG tablet; Take 1 tablet (600 mg total) by mouth every 6 (six) hours as needed.  Dispense: 30 tablet; Refill: 0          Patient Instructions       referral line number is [phone]    Treating Ankle Sprains  Treatment will depend on how bad your sprain is. For a severe sprain, healing may take 3 months or more.  Right after your injury: Use R.I.C.E.  · Rest: At first, keep weight off the ankle as much as you can. You may be given crutches to help you walk without putting weight on the ankle.  · Ice: Put an ice pack on the ankle for 15 minutes. Remove the pack and wait at least 30 minutes. Repeat for up to 3 days. This helps reduce swelling.  · Compression: To reduce swelling and keep the joint stable, you may need to  wrap the ankle with an elastic bandage. For more severe sprains, you may need an ankle brace or a cast.  · Elevation: To reduce swelling, keep your ankle raised above your heart when you sit or lie down.  Medicine  Your healthcare provider may suggest oral non-steroidal anti-inflammatory medicine (NSAIDs), such as ibuprofen. This relieves the pain and helps reduce any swelling. Be sure to take your medicine as directed.  Contrast baths  After 3 days, soak your ankle in warm water for 30 seconds, then in cool water for 30 seconds. Go back and forth for 5 minutes. Doing this every 2 hours will help keep the swelling down.  Exercises    After about 2 to 3 weeks, you may be given exercises to strengthen the ligaments and muscles in the ankle. Doing these exercises will help prevent another ankle sprain. Exercises may include standing on your toes and then on your heels and doing ankle curls.  Ankle curls  · Sit on the edge of a sturdy table or lie on your back.  · Pull your toes toward you. Then point them away from you. Repeat for 2 to 3 minutes.   Date Last Reviewed: 9/28/2015  © 5922-9709 [employer]. [address] [address]. All rights reserved. This information is not intended as a substitute for professional medical care. Always follow your healthcare professional's instructions.        Foot Sprain    A sprain is a stretching or tearing of the ligaments that hold a joint together. There are no broken bones. Sprains generally take from 3-6 weeks to heal. A sprain may be treated with a splint, walking cast, or special boot. Mild sprains may not need any additional support.  Home care  The following guidelines will help you care for your injury at home:  · Keep your leg elevated when sitting or lying down. This is very important during the first 48 hours to reduce swelling. Stay off the injured foot as much as possible until you can walk on it without pain. If needed, you may use  crutches during the first week for this purpose. Crutches can be rented at many pharmacies or surgical/orthopedic supply stores.  · You may be given a cast shoe to wear to prevent movement in your foot. If not, you can use a sandal or any shoe that does not put pressure on the injured area until the swelling and pain go away. If using a sandal, be careful not to hit your foot against anything, since another injury could make the sprain worse.  · Apply an ice pack over the injured area for 15 to 20 minutes every 3 to 6 hours. You should do this for the first 24 to 48 hours. You can make an ice pack by filling a plastic bag that seals at the top with ice cubes and then wrapping it with a thin towel. Continue to use ice packs for relief of pain and swelling as needed. As the ice melts, avoid getting any wrap, splint, or cast wet. After 48 hours, apply heat from a warm shower or bath for 20 minutes several times daily. Alternating ice and heat may also be helpful.  · You may use over-the-counter pain medicine to control pain, unless another medicine was prescribed. If you have chronic liver or kidney disease or ever had a stomach ulcer or GI bleeding, talk with your healthcare provider before using these medicines.  · If you were given a splint or cast, keep it dry. Bathe with your splint or cast well out of the water, protected with 2 large plastic bags, rubber-banded at the top end. If a fiberglass splint or cast gets wet, you can dry it with a hair dryer.  · You may return to sports after healing, when you can run without pain.  Follow-up care  Follow up with your healthcare provider as directed. Sometimes fractures dont show up on the first X-ray. Bruises and sprains can sometimes hurt as much as a fracture. These injuries can take time to heal completely. If your symptoms dont improve or they get worse, talk with your healthcare provider. You may need a repeat X-ray.  When to seek medical advice  Call your  healthcare provider right away if any of these occur:  · The plaster cast or splint gets wet or soft  · The fiberglass cast or splint gets wet and does not dry for 24 hours  · Pain or swelling increases, or redness appears  · A bad odor comes from within the cast  · Fever of 100.4°F (38°C) or above lasting for 24 to 48 hours  · Toes on the injured foot become cold, blue, numb, or tingly  Date Last Reviewed: 11/20/2015  © 3901-1518 [employer]. [address] [place] [address]. All rights reserved. This information is not intended as a substitute for professional medical care. Always follow your healthcare professional's instructions.        R.I.C.E.    R.I.C.E. stands for Rest, Ice, Compression, and Elevation. Doing these things helps limit pain and swelling after an injury. R.I.C.E. also helps injuries heal faster. Use R.I.C.E. for sprains, strains, and severe bruises or bumps. Follow the tips on this handout and begin R.I.C.E. as soon as possible after an injury.  ? Rest  Pain is your bodys way of telling you to rest an injured area. Whether you have hurt an elbow, hand, foot, or knee, limiting its use will prevent further injury and help you heal.  ? Ice  Applying ice right after an injury helps prevent swelling and reduce pain. Dont place ice directly on your skin.  · Wrap a cold pack or bag of ice in a thin cloth. Place it over the injured area.  · Ice for 10 minutes every 3 hours. Dont ice for more than 20 minutes at a time.  ? Compression  Putting pressure (compression) on an injury helps prevent swelling and provides support.  · Wrap the injured area firmly with an elastic bandage. If your hand or foot tingles, becomes discolored, or feels cold to the touch, the bandage may be too tight. Rewrap it more loosely.  · If your bandage becomes too loose, rewrap it.  · Do not wear an elastic bandage overnight.  ? Elevation  Keeping an injury elevated helps reduce swelling, pain, and  throbbing. Elevation is most effective when the injury is kept elevated higher than the heart.     Call your healthcare provider if you notice any of the following:  · Fingers or toes feel numb, are cold to the touch, or change color  · Skin looks shiny or tight  · Pain, swelling, or bruising worsens and is not improved with elevation   Date Last Reviewed: 9/3/2015  © 6458-7080 [employer]. [address] [place] [address], [place] [id number]. All rights reserved. This information is not intended as a substitute for professional medical care. Always follow your healthcare professional's instructions.

## 2020-12-30 NOTE — TELEPHONE ENCOUNTER
----- Message from Margaret elroy sent at 12/30/2020  1:31 PM CST -----  Type:  Sooner Appointment Request    Patient is requesting a sooner appointment.  Patient declined first available appointment listed as well as another facility and provider .  Patient will not accept being placed on the waitlist and is requesting a message be sent to doctor.    Name of Caller: pt    When is the first available appointment? 1/5 with doctor at     Symptoms: right foot pain     Would the patient rather a call back or a response via [facility]? Call back     Best Call Back Number:  [phone] (mobile)    Additional Information: pt states she is at  now and has been waiting for hours. There are no available appts in the [place] area. Asking to be added to Dr. Olmedo's schedule today.       Thank you

## 2020-12-30 NOTE — PATIENT INSTRUCTIONS
referral line number is [phone]    Treating Ankle Sprains  Treatment will depend on how bad your sprain is. For a severe sprain, healing may take 3 months or more.  Right after your injury: Use R.I.C.E.  · Rest: At first, keep weight off the ankle as much as you can. You may be given crutches to help you walk without putting weight on the ankle.  · Ice: Put an ice pack on the ankle for 15 minutes. Remove the pack and wait at least 30 minutes. Repeat for up to 3 days. This helps reduce swelling.  · Compression: To reduce swelling and keep the joint stable, you may need to wrap the ankle with an elastic bandage. For more severe sprains, you may need an ankle brace or a cast.  · Elevation: To reduce swelling, keep your ankle raised above your heart when you sit or lie down.  Medicine  Your healthcare provider may suggest oral non-steroidal anti-inflammatory medicine (NSAIDs), such as ibuprofen. This relieves the pain and helps reduce any swelling. Be sure to take your medicine as directed.  Contrast baths  After 3 days, soak your ankle in warm water for 30 seconds, then in cool water for 30 seconds. Go back and forth for 5 minutes. Doing this every 2 hours will help keep the swelling down.  Exercises    After about 2 to 3 weeks, you may be given exercises to strengthen the ligaments and muscles in the ankle. Doing these exercises will help prevent another ankle sprain. Exercises may include standing on your toes and then on your heels and doing ankle curls.  Ankle curls  · Sit on the edge of a sturdy table or lie on your back.  · Pull your toes toward you. Then point them away from you. Repeat for 2 to 3 minutes.   Date Last Reviewed: 9/28/2015  © 1853-6901 [employer]. [address] [place] [address], [place] [address]. All rights reserved. This information is not intended as a substitute for professional medical care. Always follow your healthcare professional's instructions.        Foot Sprain    A  sprain is a stretching or tearing of the ligaments that hold a joint together. There are no broken bones. Sprains generally take from 3-6 weeks to heal. A sprain may be treated with a splint, walking cast, or special boot. Mild sprains may not need any additional support.  Home care  The following guidelines will help you care for your injury at home:  · Keep your leg elevated when sitting or lying down. This is very important during the first 48 hours to reduce swelling. Stay off the injured foot as much as possible until you can walk on it without pain. If needed, you may use crutches during the first week for this purpose. Crutches can be rented at many pharmacies or surgical/orthopedic supply stores.  · You may be given a cast shoe to wear to prevent movement in your foot. If not, you can use a sandal or any shoe that does not put pressure on the injured area until the swelling and pain go away. If using a sandal, be careful not to hit your foot against anything, since another injury could make the sprain worse.  · Apply an ice pack over the injured area for 15 to 20 minutes every 3 to 6 hours. You should do this for the first 24 to 48 hours. You can make an ice pack by filling a plastic bag that seals at the top with ice cubes and then wrapping it with a thin towel. Continue to use ice packs for relief of pain and swelling as needed. As the ice melts, avoid getting any wrap, splint, or cast wet. After 48 hours, apply heat from a warm shower or bath for 20 minutes several times daily. Alternating ice and heat may also be helpful.  · You may use over-the-counter pain medicine to control pain, unless another medicine was prescribed. If you have chronic liver or kidney disease or ever had a stomach ulcer or GI bleeding, talk with your healthcare provider before using these medicines.  · If you were given a splint or cast, keep it dry. Bathe with your splint or cast well out of the water, protected with 2 large  plastic bags, rubber-banded at the top end. If a fiberglass splint or cast gets wet, you can dry it with a hair dryer.  · You may return to sports after healing, when you can run without pain.  Follow-up care  Follow up with your healthcare provider as directed. Sometimes fractures dont show up on the first X-ray. Bruises and sprains can sometimes hurt as much as a fracture. These injuries can take time to heal completely. If your symptoms dont improve or they get worse, talk with your healthcare provider. You may need a repeat X-ray.  When to seek medical advice  Call your healthcare provider right away if any of these occur:  · The plaster cast or splint gets wet or soft  · The fiberglass cast or splint gets wet and does not dry for 24 hours  · Pain or swelling increases, or redness appears  · A bad odor comes from within the cast  · Fever of 100.4°F (38°C) or above lasting for 24 to 48 hours  · Toes on the injured foot become cold, blue, numb, or tingly  Date Last Reviewed: 11/20/2015  © 7691-2741 [employer]. [address] [place] [address]. All rights reserved. This information is not intended as a substitute for professional medical care. Always follow your healthcare professional's instructions.        R.I.C.E.    R.I.C.E. stands for Rest, Ice, Compression, and Elevation. Doing these things helps limit pain and swelling after an injury. R.I.C.E. also helps injuries heal faster. Use R.I.C.E. for sprains, strains, and severe bruises or bumps. Follow the tips on this handout and begin R.I.C.E. as soon as possible after an injury.  ? Rest  Pain is your bodys way of telling you to rest an injured area. Whether you have hurt an elbow, hand, foot, or knee, limiting its use will prevent further injury and help you heal.  ? Ice  Applying ice right after an injury helps prevent swelling and reduce pain. Dont place ice directly on your skin.  · Wrap a cold pack or bag of ice in a thin cloth.  Place it over the injured area.  · Ice for 10 minutes every 3 hours. Dont ice for more than 20 minutes at a time.  ? Compression  Putting pressure (compression) on an injury helps prevent swelling and provides support.  · Wrap the injured area firmly with an elastic bandage. If your hand or foot tingles, becomes discolored, or feels cold to the touch, the bandage may be too tight. Rewrap it more loosely.  · If your bandage becomes too loose, rewrap it.  · Do not wear an elastic bandage overnight.  ? Elevation  Keeping an injury elevated helps reduce swelling, pain, and throbbing. Elevation is most effective when the injury is kept elevated higher than the heart.     Call your healthcare provider if you notice any of the following:  · Fingers or toes feel numb, are cold to the touch, or change color  · Skin looks shiny or tight  · Pain, swelling, or bruising worsens and is not improved with elevation   Date Last Reviewed: 9/3/2015  © 0746-5140 [employer]. [address] [place] [address], [place] [address]. All rights reserved. This information is not intended as a substitute for professional medical care. Always follow your healthcare professional's instructions.

## 2021-01-04 ENCOUNTER — OFFICE VISIT (OUTPATIENT)
Dept: PRIMARY CARE CLINIC | Facility: CLINIC | Age: 71
End: 2021-01-04
Attending: FAMILY MEDICINE
Payer: COMMERCIAL

## 2021-01-04 DIAGNOSIS — M79.671 ACUTE PAIN OF RIGHT FOOT: Primary | ICD-10-CM

## 2021-01-04 DIAGNOSIS — I10 ESSENTIAL HYPERTENSION: ICD-10-CM

## 2021-01-04 DIAGNOSIS — E78.01 FAMILIAL HYPERCHOLESTEROLEMIA: ICD-10-CM

## 2021-01-04 PROCEDURE — 99214 OFFICE O/P EST MOD 30 MIN: CPT | Mod: 95,,, | Performed by: FAMILY MEDICINE

## 2021-01-04 PROCEDURE — 99214 PR OFFICE/OUTPT VISIT, EST, LEVL IV, 30-39 MIN: ICD-10-PCS | Mod: 95,,, | Performed by: FAMILY MEDICINE

## 2021-01-05 ENCOUNTER — TELEPHONE (OUTPATIENT)
Dept: PRIMARY CARE CLINIC | Facility: CLINIC | Age: 71
End: 2021-01-05

## 2021-01-09 ENCOUNTER — IMMUNIZATION (OUTPATIENT)
Dept: FAMILY MEDICINE | Facility: CLINIC | Age: 71
End: 2021-01-09
Payer: COMMERCIAL

## 2021-01-09 DIAGNOSIS — Z23 NEED FOR VACCINATION: ICD-10-CM

## 2021-01-09 PROCEDURE — 91300 COVID-19, MRNA, LNP-S, PF, 30 MCG/0.3 ML DOSE VACCINE: CPT | Mod: PBBFAC | Performed by: INTERNAL MEDICINE

## 2021-01-11 ENCOUNTER — LAB VISIT (OUTPATIENT)
Dept: LAB | Facility: OTHER | Age: 71
End: 2021-01-11
Attending: FAMILY MEDICINE
Payer: COMMERCIAL

## 2021-01-11 ENCOUNTER — OFFICE VISIT (OUTPATIENT)
Dept: CARDIOLOGY | Facility: CLINIC | Age: 71
End: 2021-01-11
Attending: FAMILY MEDICINE
Payer: COMMERCIAL

## 2021-01-11 VITALS
DIASTOLIC BLOOD PRESSURE: 86 MMHG | SYSTOLIC BLOOD PRESSURE: 138 MMHG | BODY MASS INDEX: 25.83 KG/M2 | WEIGHT: 150.44 LBS

## 2021-01-11 DIAGNOSIS — E78.01 FAMILIAL HYPERCHOLESTEROLEMIA: ICD-10-CM

## 2021-01-11 DIAGNOSIS — I65.21 STENOSIS OF RIGHT CAROTID ARTERY: Primary | ICD-10-CM

## 2021-01-11 LAB
25(OH)D3+25(OH)D2 SERPL-MCNC: 19 NG/ML (ref 30–96)
ALBUMIN SERPL BCP-MCNC: 3.8 G/DL (ref 3.5–5.2)
ALP SERPL-CCNC: 55 U/L (ref 55–135)
ALT SERPL W/O P-5'-P-CCNC: 16 U/L (ref 10–44)
ANION GAP SERPL CALC-SCNC: 12 MMOL/L (ref 8–16)
AST SERPL-CCNC: 19 U/L (ref 10–40)
BASOPHILS # BLD AUTO: 0.08 K/UL (ref 0–0.2)
BASOPHILS NFR BLD: 1.3 % (ref 0–1.9)
BILIRUB SERPL-MCNC: 0.6 MG/DL (ref 0.1–1)
BUN SERPL-MCNC: 22 MG/DL (ref 8–23)
CALCIUM SERPL-MCNC: 9.3 MG/DL (ref 8.7–10.5)
CHLORIDE SERPL-SCNC: 101 MMOL/L (ref 95–110)
CHOLEST SERPL-MCNC: 300 MG/DL (ref 120–199)
CHOLEST/HDLC SERPL: 3.9 {RATIO} (ref 2–5)
CO2 SERPL-SCNC: 30 MMOL/L (ref 23–29)
CREAT SERPL-MCNC: 1.1 MG/DL (ref 0.5–1.4)
DIFFERENTIAL METHOD: ABNORMAL
EOSINOPHIL # BLD AUTO: 0.4 K/UL (ref 0–0.5)
EOSINOPHIL NFR BLD: 6.7 % (ref 0–8)
ERYTHROCYTE [DISTWIDTH] IN BLOOD BY AUTOMATED COUNT: 14.2 % (ref 11.5–14.5)
EST. GFR  (AFRICAN AMERICAN): 58 ML/MIN/1.73 M^2
EST. GFR  (NON AFRICAN AMERICAN): 51 ML/MIN/1.73 M^2
ESTIMATED AVG GLUCOSE: 108 MG/DL (ref 68–131)
GLUCOSE SERPL-MCNC: 93 MG/DL (ref 70–110)
HBA1C MFR BLD HPLC: 5.4 % (ref 4–5.6)
HCT VFR BLD AUTO: 41 % (ref 37–48.5)
HDLC SERPL-MCNC: 76 MG/DL (ref 40–75)
HDLC SERPL: 25.3 % (ref 20–50)
HGB BLD-MCNC: 13.3 G/DL (ref 12–16)
IMM GRANULOCYTES # BLD AUTO: 0.03 K/UL (ref 0–0.04)
IMM GRANULOCYTES NFR BLD AUTO: 0.5 % (ref 0–0.5)
LDLC SERPL CALC-MCNC: 197.2 MG/DL (ref 63–159)
LYMPHOCYTES # BLD AUTO: 2.1 K/UL (ref 1–4.8)
LYMPHOCYTES NFR BLD: 34.3 % (ref 18–48)
MCH RBC QN AUTO: 34.6 PG (ref 27–31)
MCHC RBC AUTO-ENTMCNC: 32.4 G/DL (ref 32–36)
MCV RBC AUTO: 107 FL (ref 82–98)
MONOCYTES # BLD AUTO: 1 K/UL (ref 0.3–1)
MONOCYTES NFR BLD: 15.6 % (ref 4–15)
NEUTROPHILS # BLD AUTO: 2.6 K/UL (ref 1.8–7.7)
NEUTROPHILS NFR BLD: 41.6 % (ref 38–73)
NONHDLC SERPL-MCNC: 224 MG/DL
NRBC BLD-RTO: 0 /100 WBC
PLATELET # BLD AUTO: 344 K/UL (ref 150–350)
PMV BLD AUTO: 10 FL (ref 9.2–12.9)
POTASSIUM SERPL-SCNC: 3.8 MMOL/L (ref 3.5–5.1)
PROT SERPL-MCNC: 7.4 G/DL (ref 6–8.4)
RBC # BLD AUTO: 3.84 M/UL (ref 4–5.4)
SODIUM SERPL-SCNC: 143 MMOL/L (ref 136–145)
TRIGL SERPL-MCNC: 134 MG/DL (ref 30–150)
TSH SERPL DL<=0.005 MIU/L-ACNC: 1.88 UIU/ML (ref 0.4–4)
WBC # BLD AUTO: 6.23 K/UL (ref 3.9–12.7)

## 2021-01-11 PROCEDURE — 85025 COMPLETE CBC W/AUTO DIFF WBC: CPT

## 2021-01-11 PROCEDURE — 84443 ASSAY THYROID STIM HORMONE: CPT

## 2021-01-11 PROCEDURE — 80061 LIPID PANEL: CPT

## 2021-01-11 PROCEDURE — 93010 ELECTROCARDIOGRAM REPORT: CPT | Mod: S$GLB,,, | Performed by: INTERNAL MEDICINE

## 2021-01-11 PROCEDURE — 99203 PR OFFICE/OUTPT VISIT, NEW, LEVL III, 30-44 MIN: ICD-10-PCS | Mod: 25,S$GLB,, | Performed by: INTERNAL MEDICINE

## 2021-01-11 PROCEDURE — 80053 COMPREHEN METABOLIC PANEL: CPT

## 2021-01-11 PROCEDURE — 93005 ELECTROCARDIOGRAM TRACING: CPT

## 2021-01-11 PROCEDURE — 83036 HEMOGLOBIN GLYCOSYLATED A1C: CPT

## 2021-01-11 PROCEDURE — 36415 COLL VENOUS BLD VENIPUNCTURE: CPT

## 2021-01-11 PROCEDURE — 99203 OFFICE O/P NEW LOW 30 MIN: CPT | Mod: 25,S$GLB,, | Performed by: INTERNAL MEDICINE

## 2021-01-11 PROCEDURE — 99999 PR PBB SHADOW E&M-EST. PATIENT-LVL III: CPT | Mod: PBBFAC,,, | Performed by: INTERNAL MEDICINE

## 2021-01-11 PROCEDURE — 82306 VITAMIN D 25 HYDROXY: CPT

## 2021-01-11 PROCEDURE — 99999 PR PBB SHADOW E&M-EST. PATIENT-LVL III: ICD-10-PCS | Mod: PBBFAC,,, | Performed by: INTERNAL MEDICINE

## 2021-01-11 PROCEDURE — 93010 EKG 12-LEAD: ICD-10-PCS | Mod: S$GLB,,, | Performed by: INTERNAL MEDICINE

## 2021-01-12 ENCOUNTER — PATIENT OUTREACH (OUTPATIENT)
Dept: ADMINISTRATIVE | Facility: OTHER | Age: 71
End: 2021-01-12

## 2021-01-12 DIAGNOSIS — Z12.31 ENCOUNTER FOR SCREENING MAMMOGRAM FOR MALIGNANT NEOPLASM OF BREAST: Primary | ICD-10-CM

## 2021-01-13 DIAGNOSIS — E78.5 DYSLIPIDEMIA: ICD-10-CM

## 2021-01-13 RX ORDER — ATORVASTATIN CALCIUM 80 MG/1
80 TABLET, FILM COATED ORAL DAILY
Qty: 90 TABLET | Refills: 3 | Status: SHIPPED | OUTPATIENT
Start: 2021-01-13 | End: 2021-12-28

## 2021-01-25 ENCOUNTER — HOSPITAL ENCOUNTER (OUTPATIENT)
Dept: CARDIOLOGY | Facility: OTHER | Age: 71
Discharge: HOME | End: 2021-01-25
Attending: INTERNAL MEDICINE
Payer: COMMERCIAL

## 2021-01-25 DIAGNOSIS — I65.21 STENOSIS OF RIGHT CAROTID ARTERY: ICD-10-CM

## 2021-01-25 LAB
LEFT ARM DIASTOLIC BLOOD PRESSURE: 72 MMHG
LEFT ARM SYSTOLIC BLOOD PRESSURE: 134 MMHG
LEFT CBA DIAS: 11 CM/S
LEFT CBA SYS: 40 CM/S
LEFT CCA DIST DIAS: 13 CM/S
LEFT CCA DIST SYS: 49 CM/S
LEFT CCA MID DIAS: 12 CM/S
LEFT CCA MID SYS: 40 CM/S
LEFT CCA PROX DIAS: 13 CM/S
LEFT CCA PROX SYS: 55 CM/S
LEFT ECA DIAS: 12 CM/S
LEFT ECA SYS: 63 CM/S
LEFT ICA DIST DIAS: 21 CM/S
LEFT ICA DIST SYS: 63 CM/S
LEFT ICA MID DIAS: 19 CM/S
LEFT ICA MID SYS: 50 CM/S
LEFT ICA PROX DIAS: 15 CM/S
LEFT ICA PROX SYS: 42 CM/S
LEFT VERTEBRAL DIAS: 11 CM/S
LEFT VERTEBRAL SYS: 39 CM/S
OHS CV CAROTID RIGHT ICA EDV HIGHEST: 23
OHS CV CAROTID ULTRASOUND LEFT ICA/CCA RATIO: 1.29
OHS CV CAROTID ULTRASOUND RIGHT ICA/CCA RATIO: 1.62
OHS CV PV CAROTID LEFT HIGHEST CCA: 55
OHS CV PV CAROTID LEFT HIGHEST ICA: 63
OHS CV PV CAROTID RIGHT HIGHEST CCA: 48
OHS CV PV CAROTID RIGHT HIGHEST ICA: 73
OHS CV US CAROTID LEFT HIGHEST EDV: 21
RIGHT ARM DIASTOLIC BLOOD PRESSURE: 77 MMHG
RIGHT ARM SYSTOLIC BLOOD PRESSURE: 141 MMHG
RIGHT CBA DIAS: 9 CM/S
RIGHT CBA SYS: 37 CM/S
RIGHT CCA DIST DIAS: 11 CM/S
RIGHT CCA DIST SYS: 45 CM/S
RIGHT CCA MID DIAS: 10 CM/S
RIGHT CCA MID SYS: 44 CM/S
RIGHT CCA PROX DIAS: 11 CM/S
RIGHT CCA PROX SYS: 48 CM/S
RIGHT ECA DIAS: 19 CM/S
RIGHT ECA SYS: 110 CM/S
RIGHT ICA DIST DIAS: 23 CM/S
RIGHT ICA DIST SYS: 73 CM/S
RIGHT ICA MID DIAS: 15 CM/S
RIGHT ICA MID SYS: 48 CM/S
RIGHT ICA PROX DIAS: 8 CM/S
RIGHT ICA PROX SYS: 23 CM/S
RIGHT VERTEBRAL DIAS: 12 CM/S
RIGHT VERTEBRAL SYS: 47 CM/S

## 2021-01-25 PROCEDURE — 93880 CV US DOPPLER CAROTID (CUPID ONLY): ICD-10-PCS | Mod: 26,,, | Performed by: INTERNAL MEDICINE

## 2021-01-25 PROCEDURE — 93880 EXTRACRANIAL BILAT STUDY: CPT | Mod: 26,,, | Performed by: INTERNAL MEDICINE

## 2021-01-25 PROCEDURE — 93880 EXTRACRANIAL BILAT STUDY: CPT

## 2021-01-30 ENCOUNTER — IMMUNIZATION (OUTPATIENT)
Dept: FAMILY MEDICINE | Facility: CLINIC | Age: 71
End: 2021-01-30
Payer: COMMERCIAL

## 2021-01-30 DIAGNOSIS — Z23 NEED FOR VACCINATION: Primary | ICD-10-CM

## 2021-01-30 PROCEDURE — 91300 COVID-19, MRNA, LNP-S, PF, 30 MCG/0.3 ML DOSE VACCINE: CPT | Mod: PBBFAC | Performed by: FAMILY MEDICINE

## 2021-01-30 PROCEDURE — 0002A COVID-19, MRNA, LNP-S, PF, 30 MCG/0.3 ML DOSE VACCINE: CPT | Mod: PBBFAC | Performed by: FAMILY MEDICINE

## 2021-02-19 RX ORDER — HYDROCHLOROTHIAZIDE 25 MG/1
TABLET ORAL
Qty: 90 TABLET | Refills: 3 | Status: SHIPPED | OUTPATIENT
Start: 2021-02-19 | End: 2022-01-18

## 2021-02-23 RX ORDER — AMLODIPINE BESYLATE 10 MG/1
10 TABLET ORAL DAILY
Qty: 90 TABLET | Refills: 3 | Status: SHIPPED | OUTPATIENT
Start: 2021-02-23 | End: 2022-02-11

## 2021-03-18 ENCOUNTER — PATIENT MESSAGE (OUTPATIENT)
Dept: RESEARCH | Facility: HOSPITAL | Age: 71
End: 2021-03-18

## 2021-03-26 ENCOUNTER — PATIENT MESSAGE (OUTPATIENT)
Dept: RESEARCH | Facility: HOSPITAL | Age: 71
End: 2021-03-26

## 2021-04-05 ENCOUNTER — PATIENT MESSAGE (OUTPATIENT)
Dept: ADMINISTRATIVE | Facility: HOSPITAL | Age: 71
End: 2021-04-05

## 2021-04-12 ENCOUNTER — OFFICE VISIT (OUTPATIENT)
Dept: CARDIOLOGY | Facility: CLINIC | Age: 71
End: 2021-04-12
Payer: COMMERCIAL

## 2021-04-12 VITALS
WEIGHT: 151.69 LBS | OXYGEN SATURATION: 97 % | SYSTOLIC BLOOD PRESSURE: 138 MMHG | HEART RATE: 68 BPM | BODY MASS INDEX: 26.04 KG/M2 | DIASTOLIC BLOOD PRESSURE: 82 MMHG

## 2021-04-12 DIAGNOSIS — E78.00 HYPERCHOLESTEROLEMIA: Primary | ICD-10-CM

## 2021-04-12 DIAGNOSIS — I10 ESSENTIAL HYPERTENSION: ICD-10-CM

## 2021-04-12 PROCEDURE — 99999 PR PBB SHADOW E&M-EST. PATIENT-LVL III: ICD-10-PCS | Mod: PBBFAC,,, | Performed by: INTERNAL MEDICINE

## 2021-04-12 PROCEDURE — 99214 OFFICE O/P EST MOD 30 MIN: CPT | Mod: S$GLB,,, | Performed by: INTERNAL MEDICINE

## 2021-04-12 PROCEDURE — 99999 PR PBB SHADOW E&M-EST. PATIENT-LVL III: CPT | Mod: PBBFAC,,, | Performed by: INTERNAL MEDICINE

## 2021-04-12 PROCEDURE — 99214 PR OFFICE/OUTPT VISIT, EST, LEVL IV, 30-39 MIN: ICD-10-PCS | Mod: S$GLB,,, | Performed by: INTERNAL MEDICINE

## 2021-04-20 ENCOUNTER — LAB VISIT (OUTPATIENT)
Dept: LAB | Facility: OTHER | Age: 71
End: 2021-04-20
Attending: INTERNAL MEDICINE
Payer: COMMERCIAL

## 2021-04-20 DIAGNOSIS — E78.00 HYPERCHOLESTEROLEMIA: ICD-10-CM

## 2021-04-20 LAB
ALBUMIN SERPL BCP-MCNC: 3.8 G/DL (ref 3.5–5.2)
ALP SERPL-CCNC: 66 U/L (ref 55–135)
ALT SERPL W/O P-5'-P-CCNC: 19 U/L (ref 10–44)
ANION GAP SERPL CALC-SCNC: 14 MMOL/L (ref 8–16)
AST SERPL-CCNC: 27 U/L (ref 10–40)
BILIRUB SERPL-MCNC: 1 MG/DL (ref 0.1–1)
BUN SERPL-MCNC: 23 MG/DL (ref 8–23)
CALCIUM SERPL-MCNC: 8.8 MG/DL (ref 8.7–10.5)
CHLORIDE SERPL-SCNC: 100 MMOL/L (ref 95–110)
CHOLEST SERPL-MCNC: 202 MG/DL (ref 120–199)
CHOLEST/HDLC SERPL: 2.3 {RATIO} (ref 2–5)
CO2 SERPL-SCNC: 28 MMOL/L (ref 23–29)
CREAT SERPL-MCNC: 1 MG/DL (ref 0.5–1.4)
EST. GFR  (AFRICAN AMERICAN): >60 ML/MIN/1.73 M^2
EST. GFR  (NON AFRICAN AMERICAN): 57 ML/MIN/1.73 M^2
GLUCOSE SERPL-MCNC: 91 MG/DL (ref 70–110)
HDLC SERPL-MCNC: 86 MG/DL (ref 40–75)
HDLC SERPL: 42.6 % (ref 20–50)
LDLC SERPL CALC-MCNC: 100 MG/DL (ref 63–159)
NONHDLC SERPL-MCNC: 116 MG/DL
POTASSIUM SERPL-SCNC: 3.3 MMOL/L (ref 3.5–5.1)
PROT SERPL-MCNC: 7.3 G/DL (ref 6–8.4)
SODIUM SERPL-SCNC: 142 MMOL/L (ref 136–145)
TRIGL SERPL-MCNC: 80 MG/DL (ref 30–150)

## 2021-04-20 PROCEDURE — 80053 COMPREHEN METABOLIC PANEL: CPT | Performed by: INTERNAL MEDICINE

## 2021-04-20 PROCEDURE — 80061 LIPID PANEL: CPT | Performed by: INTERNAL MEDICINE

## 2021-04-20 PROCEDURE — 36415 COLL VENOUS BLD VENIPUNCTURE: CPT | Performed by: INTERNAL MEDICINE

## 2021-08-17 ENCOUNTER — PATIENT MESSAGE (OUTPATIENT)
Dept: INTERNAL MEDICINE | Facility: CLINIC | Age: 71
End: 2021-08-17

## 2021-08-23 ENCOUNTER — IMMUNIZATION (OUTPATIENT)
Dept: INTERNAL MEDICINE | Facility: CLINIC | Age: 71
End: 2021-08-23
Payer: COMMERCIAL

## 2021-08-23 DIAGNOSIS — Z23 NEED FOR VACCINATION: Primary | ICD-10-CM

## 2021-08-23 PROCEDURE — 91300 COVID-19, MRNA, LNP-S, PF, 30 MCG/0.3 ML DOSE VACCINE: CPT | Mod: ,,, | Performed by: INTERNAL MEDICINE

## 2021-08-23 PROCEDURE — 0003A COVID-19, MRNA, LNP-S, PF, 30 MCG/0.3 ML DOSE VACCINE: ICD-10-PCS | Mod: CV19,,, | Performed by: INTERNAL MEDICINE

## 2021-08-23 PROCEDURE — 91300 COVID-19, MRNA, LNP-S, PF, 30 MCG/0.3 ML DOSE VACCINE: ICD-10-PCS | Mod: ,,, | Performed by: INTERNAL MEDICINE

## 2021-08-23 PROCEDURE — 0003A COVID-19, MRNA, LNP-S, PF, 30 MCG/0.3 ML DOSE VACCINE: CPT | Mod: CV19,,, | Performed by: INTERNAL MEDICINE

## 2021-10-05 ENCOUNTER — PATIENT MESSAGE (OUTPATIENT)
Dept: ADMINISTRATIVE | Facility: HOSPITAL | Age: 71
End: 2021-10-05

## 2021-10-12 ENCOUNTER — OFFICE VISIT (OUTPATIENT)
Dept: CARDIOLOGY | Facility: CLINIC | Age: 71
End: 2021-10-12
Payer: COMMERCIAL

## 2021-10-12 VITALS
OXYGEN SATURATION: 97 % | BODY MASS INDEX: 25.92 KG/M2 | WEIGHT: 151 LBS | SYSTOLIC BLOOD PRESSURE: 132 MMHG | DIASTOLIC BLOOD PRESSURE: 76 MMHG | HEART RATE: 77 BPM

## 2021-10-12 DIAGNOSIS — I10 ESSENTIAL HYPERTENSION: ICD-10-CM

## 2021-10-12 DIAGNOSIS — I25.10 ATHEROSCLEROSIS OF NATIVE CORONARY ARTERY OF NATIVE HEART WITHOUT ANGINA PECTORIS: ICD-10-CM

## 2021-10-12 DIAGNOSIS — E78.00 HYPERCHOLESTEROLEMIA: Primary | ICD-10-CM

## 2021-10-12 DIAGNOSIS — N18.31 STAGE 3A CHRONIC KIDNEY DISEASE: ICD-10-CM

## 2021-10-12 DIAGNOSIS — I65.21 STENOSIS OF RIGHT CAROTID ARTERY: ICD-10-CM

## 2021-10-12 PROCEDURE — 99214 OFFICE O/P EST MOD 30 MIN: CPT | Mod: S$GLB,,, | Performed by: INTERNAL MEDICINE

## 2021-10-12 PROCEDURE — 99999 PR PBB SHADOW E&M-EST. PATIENT-LVL III: ICD-10-PCS | Mod: PBBFAC,,, | Performed by: INTERNAL MEDICINE

## 2021-10-12 PROCEDURE — 99999 PR PBB SHADOW E&M-EST. PATIENT-LVL III: CPT | Mod: PBBFAC,,, | Performed by: INTERNAL MEDICINE

## 2021-10-12 PROCEDURE — 99214 PR OFFICE/OUTPT VISIT, EST, LEVL IV, 30-39 MIN: ICD-10-PCS | Mod: S$GLB,,, | Performed by: INTERNAL MEDICINE

## 2021-12-08 ENCOUNTER — TELEPHONE (OUTPATIENT)
Dept: PRIMARY CARE CLINIC | Facility: CLINIC | Age: 71
End: 2021-12-08
Payer: MEDICARE

## 2021-12-08 DIAGNOSIS — R09.81 SINUS CONGESTION: Primary | ICD-10-CM

## 2021-12-08 RX ORDER — METHYLPREDNISOLONE 4 MG/1
TABLET ORAL
Qty: 21 EACH | Refills: 0 | Status: SHIPPED | OUTPATIENT
Start: 2021-12-08 | End: 2021-12-29

## 2021-12-13 ENCOUNTER — OFFICE VISIT (OUTPATIENT)
Dept: INTERNAL MEDICINE | Facility: CLINIC | Age: 71
End: 2021-12-13
Payer: MEDICARE

## 2021-12-13 VITALS
HEART RATE: 81 BPM | DIASTOLIC BLOOD PRESSURE: 78 MMHG | WEIGHT: 147.5 LBS | BODY MASS INDEX: 25.32 KG/M2 | OXYGEN SATURATION: 98 % | SYSTOLIC BLOOD PRESSURE: 136 MMHG

## 2021-12-13 DIAGNOSIS — R26.89 BALANCE PROBLEM: ICD-10-CM

## 2021-12-13 DIAGNOSIS — J32.9 SINUSITIS, UNSPECIFIED CHRONICITY, UNSPECIFIED LOCATION: Primary | ICD-10-CM

## 2021-12-13 PROCEDURE — 99214 OFFICE O/P EST MOD 30 MIN: CPT | Mod: S$GLB,,, | Performed by: PHYSICIAN ASSISTANT

## 2021-12-13 PROCEDURE — 99999 PR PBB SHADOW E&M-EST. PATIENT-LVL IV: ICD-10-PCS | Mod: PBBFAC,,, | Performed by: PHYSICIAN ASSISTANT

## 2021-12-13 PROCEDURE — 99999 PR PBB SHADOW E&M-EST. PATIENT-LVL IV: CPT | Mod: PBBFAC,,, | Performed by: PHYSICIAN ASSISTANT

## 2021-12-13 PROCEDURE — 99214 PR OFFICE/OUTPT VISIT, EST, LEVL IV, 30-39 MIN: ICD-10-PCS | Mod: S$GLB,,, | Performed by: PHYSICIAN ASSISTANT

## 2021-12-28 DIAGNOSIS — E78.5 DYSLIPIDEMIA: ICD-10-CM

## 2021-12-28 RX ORDER — ATORVASTATIN CALCIUM 80 MG/1
TABLET, FILM COATED ORAL
Qty: 90 TABLET | Refills: 1 | Status: SHIPPED | OUTPATIENT
Start: 2021-12-28 | End: 2022-05-13

## 2022-01-04 ENCOUNTER — CLINICAL SUPPORT (OUTPATIENT)
Dept: REHABILITATION | Facility: OTHER | Age: 72
End: 2022-01-04
Payer: MEDICARE

## 2022-01-04 DIAGNOSIS — R26.89 BALANCE PROBLEM: ICD-10-CM

## 2022-01-04 PROCEDURE — 97110 THERAPEUTIC EXERCISES: CPT | Mod: HCNC,PN

## 2022-01-04 PROCEDURE — 97163 PT EVAL HIGH COMPLEX 45 MIN: CPT | Mod: HCNC,PN

## 2022-01-04 NOTE — PLAN OF CARE
"[facility]   Physical Therapy Initial Evaluation     Date: 1/4/2022   Name: Jessica Floyd  Clinic Number: [id number]    Therapy Diagnosis:   Encounter Diagnosis   Name Primary?    Balance problem      Physician: Niyah Sheffield PA-C    Physician Orders: PT Eval and Treat   Medical Diagnosis from Referral: R26.89 (ICD-10-CM) - Balance problem   Evaluation Date: 1/4/2022  Authorization Period Expiration: 12/13/2022  Plan of Care Expiration: 03/04/2022  Visit # / Visits authorized: 1/1 future auth pending   FOTO: #1/3    Precautions: Standard and Fall     Time In: 08:28am  Time Out: 09:38am  Total Appointment Time (timed & untimed codes): 70 minutes    SUBJECTIVE   Date of onset: Gisele reyes in 1997 brain aneurysm burst impacting L field of vision and temporary paralysis on L side. Two years later, patient was having angiogram and carotid was perforated. Patient developed reflux syncope in 2014 impacting her balance (looks down or up and has seizure and passes out). Patient states that she last passed out from syncope about a year ago but has managed these symptoms by doing yoga breathing and lying flat.     History of current condition - Jessica reports that she is seeking PHYSICAL THERAPY for her balance. She has to hold on to something to negotiate stairs and cant wait as far as she used to in the past. Limitation in getting up from ground to do yoga.     Falls: no falls in the past year, but has "come close". Patient states that she looses her steadiness chintan when negotiating curbs. Uses her cane to remain confident.     Imaging, x-ray of foot 1 year ago    Prior Therapy: yes, after knee surgery around 2013   Social History: lives with , 5 stairs and hand rail to enter home   Occupation: retired   Prior Level of Function: mild limitation, caring for 4 year old grand child, walking often without cane   Current Level of Function: moderate limitation; limitation in walking dogs > 2 " "blocks and caring for grand child     Pain:  Current 0/10, worst 6/10, best 0/10  Location: R toes     Patients goals: get back to not having to use hand rail to negotiate stairs, improve walking > 2 blocks      Medical History:   Past Medical History:   Diagnosis Date    Allergic rhinitis     Carotid stenosis     R CCA stent, L CCA 60%    Coronary atherosclerosis     Outside [facility] 6/2014: 20% mLAD, 50% mCx, 20%, mRCA    Dyslipidemia     ESBL (extended spectrum beta-lactamase) producing bacteria infection     UTI    Hypertension     Nausea and vomiting 5/26/2017    Subarachnoid hemorrhage due to ruptured aneurysm 1999     Surgical History:   Jessica Floyd  has a past surgical history that includes Anterior cruciate ligament repair (2012); Dental surgery; and Cerebral aneurysm repair (1999).    Medications:   Jessica has a current medication list which includes the following prescription(s): amlodipine, aspirin, atorvastatin, carvedilol, fluticasone propionate, hydrochlorothiazide, and pantoprazole.    Allergies:   Review of patient's allergies indicates:  No Known Allergies     OBJECTIVE   1/4/2022:  Observation: Significant fear avoidance (of falling) during assessment and treatment today.  Positional and transitional tolerance impacted by dizziness/shortness of breath. L visual field begins around 30 degrees from midline.    Posture: forward flexed at hips with center of gravity forward past base of support.    Gait: walks with cane as "security blanket" on curbs     Range of Motion: AROM:  Hip Left  Right    Flexion 116 109     Knee Left  Right    Extension 0 0   Flexion 132 142     Ankle Left  Right    Dorsiflexion 7 6   Plantarflexion 51 40     Strength:  Hip Left  Right    Flexion 4-/5 4/5   Abduction 3+/5 3+   Adduction 4- 4-/5   Extension 3-  Difficulty clearing mat  4- with hip substitution movements      Knee Left  Right   Extension 4/5 4-/5     Ankle Left  Right    Dorsiflexion 4/5 4/5 " "  Plantarflexion 13 heel raises with SC  13 heel raises with SC     Special Tests:   Left Right   Slump  Negative  Negative      Function:   Sit - stand:10 X in 30"      TUG:  Trial 1: 16", CGA, short step length   Trial 2: 14", CGA, short step length  Trial 3: 14", CGA, short step length  Average: 14.67"     Sensation: light touch sensation intact and equal bilaterally     Static Balance   Left  Right    SLS  Unsteadiness with B HRA Unsteadiness with B HRA    Rhomberg stand, EO, firm surface  25" CGA , feet not completely touching      Rhomberg stand, EC, firm surface 7" min A, feet not completely toucing      Rhomberg srand, EO, unsteady surface Not appropriate to test at this time     Rhomberg stand, EC, unsteady surface  Not appropriate to test at this time     Modified tandem stand   Unable to attempt - patient fearful and hesitant to let go of cane, also holding on to PT 30" CGA      Limitation/Restriction for FOTO Balance Survey    Therapist reviewed FOTO scores for Jessica Floyd on 1/4/2022.   FOTO documents entered into [employer] - see Media section.    Limitation Score: 51%  Predicted Score: 58%       TREATMENT   Total Treatment time (time-based codes) separate from Evaluation: 30 minutes      therapeutic exercises for 25 minutes:   Patient education on nature of current condition and PHYSICAL THERAPY POC   Written HOME EXERCISE PROGRAM provided, reviewed, patient demo understanding   Heel raises, B HRA, 3x5   SLR abduction in standing, unilateral R/L, 3x5     neuromuscular re-education for 00 minutes:     therapeutic activities for 5 minutes:  Alt LE marches, 3" hold up R/L LE, x15     gait training for 00 minutes:     PATIENT EDUCATION AND HOME EXERCISES     Education provided:   - yes    Written Home Exercises Provided: yes. Exercises were reviewed and Jessica was able to demonstrate them prior to the end of the session.  Jessica demonstrated good  understanding of the education provided. See EMR under Patient " Instructions for exercises provided during therapy sessions.    ASSESSMENT   Jessica is a 71 y.o. female referred to outpatient Physical Therapy with a medical diagnosis of balance problems. Patient presents with decreased ROM, muscle strength, flexibility, joint mobility. Increased pain, stiffness, soft tissue restriction. Impaired posture, joint mechanics, balance, gait in addition to fear avoidance.     Patient prognosis is Fair.   Patientt will benefit from skilled outpatient Physical Therapy to address the deficits stated above and in the chart below, provide patient /family education, and to maximize patientt's level of independence.     Plan of care discussed with patient: Yes  Patient's spiritual, cultural and educational needs considered and patient is agreeable to the plan of care and goals as stated below:     Medical Necessity is demonstrated by the following  History  Co-morbidities and personal factors that may impact the plan of care Co-morbidities:   Allergic rhinitis   Carotid stenosis   R CCA stent, L CCA 60%   Coronary atherosclerosis   Outside [facility] 6/2014: 20% mLAD, 50% mCx, 20%, mRCA   Dyslipidemia   ESBL (extended spectrum beta-lactamase) producing bacteria infection   UTI   Hypertension   Nausea and vomiting   Subarachnoid hemorrhage due to ruptured aneurysm     Personal Factors:   Transportation      high   Examination  Body Structures and Functions, activity limitations and participation restrictions that may impact the plan of care Body Regions:   head  neck  back  lower extremities  upper extremities  trunk    Body Systems:    ROM  strength  gross coordinated movement  balance  gait  transfers  transitions  motor control  motor learning    Participation Restrictions:   Limitation in negotiating stairs and curbs for household and community mobility tasks, participating in yoga, getting up from the ground, interacting with and caring for grandchild, walking dogs    Activity limitations:  "  Learning and applying knowledge  no deficits    General Tasks and Commands  Multi tasking     Communication  no deficits    Mobility  lifting and carrying objects  fine hand use (grasping/picking up)  walking  driving (bike, car, motorcycle)    Self care  washing oneself (bathing, drying, washing hands)  dressing  looking after one's health    Domestic Life  shopping  cooking  doing house work (cleaning house, washing dishes, laundry)  assisting others    Interactions/Relationships  basic interpersonal interactions  complex interpersonal interactions  relating with strangers  formal relationships  family relationships    Life Areas  basic economic transactions    Community and Social Life  community life  recreation and leisure         high   Clinical Presentation unstable clinical presentation with unpredictable characteristics high   Decision Making/ Complexity Score: high     Anticipated Barriers for therapy: fall risk, transportation, co morbidities     Goals:  Short Term Goals: 4 weeks   (1) patient will be I with HOME EXERCISE PROGRAM (initial, not met)  (2) patient will complete 13 sit - stand transfers within 30" to facilitate improved transitional tolerance throughout ADLs (initial, not met)   (3) patient will complete 25 heel raises, no HRA, to facilitate improved static balance when reaching into upper cabinets (initial, not met)     Long Term Goals: 8 weeks   (1) patient will participate in modified tandem stand with L LE in front for 30" with CGA to minimize risk of fall (initial, not met)   (2) patient will participate in modified Rhomberg stand, EC, firm surface for 25" with CGA to minimize risk of fall (initial, not met)   (3) patient will average >13.5" TUG trials to facilitate decreased risk of fall during community mobility tasks (initial, not met)     PLAN   Plan of care Certification: 1/4/2022 to 03/04/2022    Outpatient Physical Therapy 2 times weekly for 8 weeks to include the following " interventions: Gait Training, Manual Therapy, Moist Heat/ Ice, Neuromuscular Re-ed, Patient Education, Self Care, Therapeutic Activities and Therapeutic Exercise.     Physical therapist and physical therapy assistant(s) met face to face to discuss patient's treatment plan and progress towards established goals. Pt will be seen by a physical therapist minimally every 6th visit or every 30 days.    Mona Pierre, PT      I CERTIFY THE NEED FOR THESE SERVICES FURNISHED UNDER THIS PLAN OF TREATMENT AND WHILE UNDER MY CARE   Physician's comments:     Physician's Signature: ___________________________________________________

## 2022-01-07 ENCOUNTER — CLINICAL SUPPORT (OUTPATIENT)
Dept: REHABILITATION | Facility: OTHER | Age: 72
End: 2022-01-07
Payer: MEDICARE

## 2022-01-07 DIAGNOSIS — R26.89 BALANCE PROBLEM: Primary | ICD-10-CM

## 2022-01-07 PROCEDURE — 97530 THERAPEUTIC ACTIVITIES: CPT | Mod: HCNC,PN

## 2022-01-07 PROCEDURE — 97112 NEUROMUSCULAR REEDUCATION: CPT | Mod: HCNC,PN

## 2022-01-07 PROCEDURE — 97110 THERAPEUTIC EXERCISES: CPT | Mod: HCNC,PN

## 2022-01-07 NOTE — PROGRESS NOTES
"[facility]   Physical Therapy Treatment Note     Name: Jessica Floyd  Clinic Number: [id number]    Therapy Diagnosis:   Encounter Diagnosis   Name Primary?    Balance problem Yes     Physician: Niyah Sheffield PA-C    Visit Date: 1/7/2022       Physician Orders: PT Eval and Treat   Medical Diagnosis from Referral: R26.89 (ICD-10-CM) - Balance problem   Evaluation Date: 1/4/2022  Authorization Period Expiration: 4/1/22  Plan of Care Expiration: 03/04/2022  Visit # / Visits authorized: 1/20  FOTO: #1/3     Precautions: Standard and Fall     Time In: 1100am  Time Out: 1200pm  Total Billable Time: 60 minutes      SUBJECTIVE     Pt reports: that she feels "about the same" today. "I am scared I am going to fall and want to work on my balance."  She was compliant with home exercise program.  Response to previous treatment: no change  Functional change: no change    Pain: 0/10  Location: Pt denies pain today    OBJECTIVE     Objective Measures updated at progress report unless specified.       TREATMENT     Total Treatment time (time-based codes) separate from Evaluation: 60 minutes     Jessica received the treatments listed below:      Patient received therapeutic exercises for 15 minutes for improved strength and AROM including:  Heel raises, B HRA, 3x8   Standing hip abd 3x10 repetitions  Standing hip extension 3x10 repetitions            Patient received neuromuscular reeducation for 30 minutes for improved proprioception and balance including:  Gait belt donned for all balance exercises  Alt LE marchquirino, 3" hold up R/L LE, x15   Step ups on 6" step with one HR CGA 3x10 reps  Step downs on 6" step with one HR CGA 3x10 reps  Side stepping with alternating shoulder taps 4x20" each  Tandem walking 4x20" each        Patient received therapeutic activities for 15 minutes for improved tolerance to functional activities including:   Sit <> stand with pilates ring and one foam cushion 3x10 " "repetitions  Standing hip abduction in // bars 3x10 each         PATIENT EDUCATION AND HOME EXERCISES     Home Exercises Provided and Patient Education Provided     Education provided:   PT educated pt on importance of compliance with their HEP this visit.     Written Home Exercises Provided: Patient instructed to cont prior HEP. Exercises were reviewed and Jessica was able to demonstrate them prior to the end of the session.  Jessica demonstrated good  understanding of the education provided. See EMR under Patient Instructions for exercises provided during therapy sessions    ASSESSMENT   Pt tolerated tx session well today and completed all therapeutic exercises with two minor LOB. Pt was able to self correct via hip strategy.  Progressed dynamic balance exercisesthis visit.     Jessica Is progressing well towards her goals.   Pt prognosis is Fair.     Pt will continue to benefit from skilled outpatient physical therapy to address the deficits listed in the problem list box on initial evaluation, provide pt/family education and to maximize pt's level of independence in the home and community environment.     Pt's spiritual, cultural and educational needs considered and pt agreeable to plan of care and goals.     Anticipated barriers to physical therapy: None      Goals:  Short Term Goals: 4 weeks   (1) patient will be I with HOME EXERCISE PROGRAM (initial, not met)  (2) patient will complete 13 sit - stand transfers within 30" to facilitate improved transitional tolerance throughout ADLs (initial, not met)   (3) patient will complete 25 heel raises, no HRA, to facilitate improved static balance when reaching into upper cabinets (initial, not met)      Long Term Goals: 8 weeks   (1) patient will participate in modified tandem stand with L LE in front for 30" with CGA to minimize risk of fall (initial, not met)   (2) patient will participate in modified Rhomberg stand, EC, firm surface for 25" with CGA to minimize risk " "of fall (initial, not met)   (3) patient will average >13.5" TUG trials to facilitate decreased risk of fall during community mobility tasks (initial, not met)     PLAN   Continue PT POC with emphasis on increasing pt's dynamic balance, hip strength and endurance so that they may return to functional tasks and activities of daily living. Plan to progress coordination exercises next visit.     Shazia Elkins, PT      "

## 2022-01-11 ENCOUNTER — CLINICAL SUPPORT (OUTPATIENT)
Dept: REHABILITATION | Facility: OTHER | Age: 72
End: 2022-01-11
Payer: MEDICARE

## 2022-01-11 DIAGNOSIS — R26.89 BALANCE PROBLEM: Primary | ICD-10-CM

## 2022-01-11 PROCEDURE — 97530 THERAPEUTIC ACTIVITIES: CPT | Mod: HCNC,PN

## 2022-01-11 PROCEDURE — 97112 NEUROMUSCULAR REEDUCATION: CPT | Mod: HCNC,PN

## 2022-01-11 PROCEDURE — 97110 THERAPEUTIC EXERCISES: CPT | Mod: HCNC,PN

## 2022-01-11 NOTE — PROGRESS NOTES
"[facility]   Physical Therapy Treatment Note     Name: Jessica Floyd  Clinic Number: [id number]    Therapy Diagnosis:   Encounter Diagnosis   Name Primary?    Balance problem Yes     Physician: Niyah Sheffield PA-C    Visit Date: 1/11/2022       Physician Orders: PT Eval and Treat   Medical Diagnosis from Referral: R26.89 (ICD-10-CM) - Balance problem   Evaluation Date: 1/4/2022  Authorization Period Expiration: 4/1/22  Plan of Care Expiration: 03/04/2022  Visit # / Visits authorized: 3/20  FOTO: #1/3     Precautions: Standard and Fall     Time In: 0802am  Time Out: 0902am  Total Billable Time: 60 minutes      SUBJECTIVE     Pt reports: that she feels "a little more confident" with her balance. Pt repainted her porch this weekend with no LOB reported. Pt states that she is still fearful of balance exercises.   She was compliant with home exercise program.  Response to previous treatment: no change  Functional change: no change    Pain: 0/10  Location: Pt denies pain today    OBJECTIVE     Objective Measures updated at progress report unless specified.       TREATMENT     Total Treatment time (time-based codes) separate from Evaluation: 60 minutes     Jessica received the treatments listed below:      Patient received therapeutic exercises for 22 minutes for improved strength and AROM including:  Heel raises, B HRA, 3x8   Standing hip abd 3x10 repetitions  Standing hip extension 3x10 repetitions  Bridges 3x10 repetitions  Supine Hip adduction ball squeeze 30x 5"  SL clams 3x10 repetitions    Patient received neuromuscular reeducation for 30 minutes for improved proprioception and balance including:  Gait belt donned for all balance exercises  Alt LE marches, 3" hold up R/L LE, x15   Step ups on 6" step with one HR CGA 3x10 reps  Step downs on 6" step with one HR CGA 3x10 reps  Side stepping with alternating shoulder taps 4x20" each CGA  Tandem walking 4x20" each CGA          Patient " "received therapeutic activities for 8 minutes for improved tolerance to functional activities including:   Sit <> stand with pilates ring and one foam cushion 3x10 repetitions          PATIENT EDUCATION AND HOME EXERCISES     Home Exercises Provided and Patient Education Provided     Education provided:   PT educated pt on importance of compliance with their HEP this visit.     Written Home Exercises Provided: Patient instructed to cont prior HEP. Exercises were reviewed and Jessica was able to demonstrate them prior to the end of the session.  Jessica demonstrated good  understanding of the education provided. See EMR under Patient Instructions for exercises provided during therapy sessions    ASSESSMENT   Pt tolerated tx session fairly today. Pt experienced two minor LOB during standing exercises today. Pt able to recover via hip strategy. Pt's fear avoidance with balance exercises is hindering her progress with PT. Pt required maximal encouragement to participate in standing balance exercises 2/2 fear of falling. Plan to continue to address balance impairments.     Jessica Is progressing well towards her goals.   Pt prognosis is Fair.     Pt will continue to benefit from skilled outpatient physical therapy to address the deficits listed in the problem list box on initial evaluation, provide pt/family education and to maximize pt's level of independence in the home and community environment.     Pt's spiritual, cultural and educational needs considered and pt agreeable to plan of care and goals.     Anticipated barriers to physical therapy: None      Goals:  Short Term Goals: 4 weeks   (1) patient will be I with HOME EXERCISE PROGRAM (MET, 1/11/22)  (2) patient will complete 13 sit - stand transfers within 30" to facilitate improved transitional tolerance throughout ADLs (initial, not met)   (3) patient will complete 25 heel raises, no HRA, to facilitate improved static balance when reaching into upper cabinets " "(initial, not met)      Long Term Goals: 8 weeks   (1) patient will participate in modified tandem stand with L LE in front for 30" with CGA to minimize risk of fall (initial, not met)   (2) patient will participate in modified Rhomberg stand, EC, firm surface for 25" with CGA to minimize risk of fall (initial, not met)   (3) patient will average >13.5" TUG trials to facilitate decreased risk of fall during community mobility tasks (initial, not met)     PLAN   Plan of care Certification: 1/4/2022 to 03/04/2022     Outpatient Physical Therapy 2 times weekly for 8 weeks to include the following interventions: Gait Training, Manual Therapy, Moist Heat/ Ice, Neuromuscular Re-ed, Patient Education, Self Care, Therapeutic Activities and Therapeutic Exercise.      Physical therapist and physical therapy assistant(s) met face to face to discuss patient's treatment plan and progress towards established goals. Pt will be seen by a physical therapist minimally every 6th visit or every 30 days.      Shazia Elkins, PT      "

## 2022-01-13 ENCOUNTER — CLINICAL SUPPORT (OUTPATIENT)
Dept: REHABILITATION | Facility: OTHER | Age: 72
End: 2022-01-13
Payer: MEDICARE

## 2022-01-13 DIAGNOSIS — R26.89 BALANCE PROBLEM: Primary | ICD-10-CM

## 2022-01-13 PROCEDURE — 97112 NEUROMUSCULAR REEDUCATION: CPT | Mod: HCNC,PN

## 2022-01-13 PROCEDURE — 97110 THERAPEUTIC EXERCISES: CPT | Mod: HCNC,PN

## 2022-01-13 PROCEDURE — 97530 THERAPEUTIC ACTIVITIES: CPT | Mod: HCNC,PN

## 2022-01-13 NOTE — PROGRESS NOTES
"[facility]   Physical Therapy Treatment Note     Name: Jessica Floyd  Clinic Number: [id number]    Therapy Diagnosis:   Encounter Diagnosis   Name Primary?    Balance problem Yes     Physician: Niyah Sheffield PA-C    Visit Date: 1/13/2022    Physician Orders: PT Eval and Treat   Medical Diagnosis from Referral: R26.89 (ICD-10-CM) - Balance problem   Evaluation Date: 1/4/2022  Authorization Period Expiration: 4/1/22  Plan of Care Expiration: 03/04/2022  Visit # / Visits authorized: 4/20  FOTO: #2/3 (52% on 1/13/2022)     Precautions: Standard and Fall     Time In: 08:08am  Time Out: 09:05am  Total Billable Time: 57 minutes      SUBJECTIVE     She was compliant with home exercise program.  Response to previous treatment: feels cramps at night in calves from doing so many heel raises throughout the day   Function:  has noticed that she is negotiating stairs more confidently     Pain: 0/10  Location: Pt denies pain today    OBJECTIVE     Objective Measures updated at progress report unless specified.       TREATMENT   Patient received therapeutic exercises for 27 minutes for improved strength and AROM including:  Heel raises, B HRA, 3x9  Standing hip abd, R/L, 3x10 repetitions  Standing hip extension 3x10 repetitions  Bridges 3x10 repetitions, 3" hold up  Supine Hip adduction ball squeeze 30x 5"  SL R/L clam shell, anne band, 3x10     Patient received neuromuscular reeducation for 20 minutes for improved proprioception and balance including:  Gait belt donned for all balance exercises  Alt LE marches, 3" hold up R/L LE, x15   Step ups on 6" step with one HR CGA 3x10 reps  Step downs on 6" step with one HR CGA 3x10 reps  Side stepping with alternating shoulder taps 4x20" each CGA  Tandem walking 4x20" each CGA  (initiate) static balance training     Patient received therapeutic activities for 10 minutes for improved tolerance to functional activities including:   Sit <> stand (with " "pilates ring) and one foam cushion 3x10  (initiate) leg press machine: single limb, from low point, 2 black cords    PATIENT EDUCATION AND HOME EXERCISES     Home Exercises Provided and Patient Education Provided     Education provided:   PT educated pt on importance of compliance with their HEP this visit.     Written Home Exercises Provided: Patient instructed to cont prior HEP. Exercises were reviewed and Jessica was able to demonstrate them prior to the end of the session.  Jessica demonstrated good  understanding of the education provided. See EMR under Patient Instructions for exercises provided during therapy sessions    ASSESSMENT   Difficulty with multi tasking as patient stops exercise when engaging in conversation. Significant fear avoidance towards sit - stand transfers while holding pilates ring requiring pushing task off to later in session twice and then only agreeing to participate when not using ring (pushes with hands on legs). This is functionally significant in the context of impacting risk of fall and ability to progresses exercises in PHYSICAL THERAPY to meet patient's optimal challenge point.      Jessica is making fair progress towards meeting set goals.   Pt prognosis is Fair.     Pt will continue to benefit from skilled outpatient physical therapy to address the deficits listed in the problem list box on initial evaluation, provide pt/family education and to maximize pt's level of independence in the home and community environment.     Pt's spiritual, cultural and educational needs considered and pt agreeable to plan of care and goals.     Anticipated barriers to physical therapy: None      Goals:  Short Term Goals: 4 weeks   (1) patient will be I with HOME EXERCISE PROGRAM (MET, 1/11/22)  (2) patient will complete 13 sit - stand transfers within 30" to facilitate improved transitional tolerance throughout ADLs (initial, not met)   (3) patient will complete 25 heel raises, no HRA, to " "facilitate improved static balance when reaching into upper cabinets (initial, not met)      Long Term Goals: 8 weeks   (1) patient will participate in modified tandem stand with L LE in front for 30" with CGA to minimize risk of fall (initial, not met)   (2) patient will participate in modified Rhomberg stand, EC, firm surface for 25" with CGA to minimize risk of fall (initial, not met)   (3) patient will average >13.5" TUG trials to facilitate decreased risk of fall during community mobility tasks (initial, not met)     PLAN   Plan of care Certification: 1/4/2022 to 03/04/2022     Outpatient Physical Therapy 2 times weekly for 8 weeks to include the following interventions: Gait Training, Manual Therapy, Moist Heat/ Ice, Neuromuscular Re-ed, Patient Education, Self Care, Therapeutic Activities and Therapeutic Exercise.      Physical therapist and physical therapy assistant(s) met face to face to discuss patient's treatment plan and progress towards established goals. Pt will be seen by a physical therapist minimally every 6th visit or every 30 days.      Mona Pierre, PT        "

## 2022-01-19 ENCOUNTER — CLINICAL SUPPORT (OUTPATIENT)
Dept: REHABILITATION | Facility: OTHER | Age: 72
End: 2022-01-19
Payer: MEDICARE

## 2022-01-19 DIAGNOSIS — R26.89 BALANCE PROBLEM: Primary | ICD-10-CM

## 2022-01-19 PROCEDURE — 97110 THERAPEUTIC EXERCISES: CPT | Mod: HCNC,PN

## 2022-01-19 PROCEDURE — 97112 NEUROMUSCULAR REEDUCATION: CPT | Mod: HCNC,PN

## 2022-01-19 PROCEDURE — 97530 THERAPEUTIC ACTIVITIES: CPT | Mod: HCNC,PN

## 2022-01-19 NOTE — PROGRESS NOTES
"[facility]   Physical Therapy Treatment Note     Name: Jessica Floyd  Clinic Number: [id number]    Therapy Diagnosis:   Encounter Diagnosis   Name Primary?    Balance problem Yes     Physician: Niyah Sheffield PA-C    Visit Date: 1/19/2022    Physician Orders: PT Eval and Treat   Medical Diagnosis from Referral: R26.89 (ICD-10-CM) - Balance problem   Evaluation Date: 1/4/2022  Authorization Period Expiration: 4/1/22  Plan of Care Expiration: 03/04/2022  Visit # / Visits authorized: 5/20  FOTO: #2/3 (52% on 1/13/2022)     Precautions: Standard and Fall     Time In: 08:02am  Time Out: 09:02am  Total Billable Time: 60 minutes      SUBJECTIVE   Pt reports that she felt nervous/uneasy during her last session. Pt states that she does not enjoy the balance exercises. PT educated pt on the importance of challenging her (appropriately) during therapy for improved balance. Pt verbalized understanding.       She was compliant with home exercise program.  Response to previous treatment: feels cramps at night in calves from doing so many heel raises throughout the day   Function:  has noticed that she is negotiating stairs more confidently     Pain: 0/10  Location: Pt denies pain today    OBJECTIVE     Objective Measures updated at progress report unless specified.       TREATMENT   Patient received therapeutic exercises for 30 minutes for improved strength and AROM including:  Heel raises, B HRA, 3x10  Standing hip abd, R/L, 3x10 repetitions  Standing hip extension 3x10 repetitions  Bridges 3x10 repetitions, 3" hold up  Supine Hip adduction ball squeeze 30x 5"  SL R/L clam shell, anne band, 3x10     Patient received neuromuscular reeducation for 20 minutes for improved proprioception and balance including:  Gait belt donned for all balance exercises  Alt LE marches, 3" hold up R/L LE, x15   Step ups on 6" step with one HR CGA 3x10 reps  Step downs on 6" step with one HR CGA 3x10 reps  Side " "stepping with alternating shoulder taps 4x20" each CGA  Tandem walking 4x20" each CGA  (initiate) static balance training     Patient received therapeutic activities for 8 minutes for improved tolerance to functional activities including:   Sit <> stand and one foam cushion 3x8  (initiate) leg press machine: single limb, from low point, 2 black cords    PATIENT EDUCATION AND HOME EXERCISES     Home Exercises Provided and Patient Education Provided     Education provided:   PT educated pt on importance of compliance with their HEP this visit.     Written Home Exercises Provided: Patient instructed to cont prior HEP. Exercises were reviewed and Jessica was able to demonstrate them prior to the end of the session.  Jessica demonstrated good  understanding of the education provided. See EMR under Patient Instructions for exercises provided during therapy sessions    ASSESSMENT   Pt tolerated tx session fairly today. Pt's fear avoidance is limiting her progress with PT tx and should continue be addressed. Pt requires frequent redirection to participate in exercises rather than engage in conversation. Pt attempting to furniture walk in gym today with tandem walking and alternation marching exercises. Plan to progress as pt tolerates.     Jessica is making fair progress towards meeting set goals.   Pt prognosis is Fair.     Pt will continue to benefit from skilled outpatient physical therapy to address the deficits listed in the problem list box on initial evaluation, provide pt/family education and to maximize pt's level of independence in the home and community environment.     Pt's spiritual, cultural and educational needs considered and pt agreeable to plan of care and goals.     Anticipated barriers to physical therapy: None      Goals:  Short Term Goals: 4 weeks   (1) patient will be I with HOME EXERCISE PROGRAM (MET, 1/11/22)  (2) patient will complete 13 sit - stand transfers within 30" to facilitate improved " "transitional tolerance throughout ADLs (initial, not met)   (3) patient will complete 25 heel raises, no HRA, to facilitate improved static balance when reaching into upper cabinets (initial, not met)      Long Term Goals: 8 weeks   (1) patient will participate in modified tandem stand with L LE in front for 30" with CGA to minimize risk of fall (initial, not met)   (2) patient will participate in modified Rhomberg stand, EC, firm surface for 25" with CGA to minimize risk of fall (initial, not met)   (3) patient will average >13.5" TUG trials to facilitate decreased risk of fall during community mobility tasks (initial, not met)     PLAN   Plan of care Certification: 1/4/2022 to 03/04/2022     Outpatient Physical Therapy 2 times weekly for 8 weeks to include the following interventions: Gait Training, Manual Therapy, Moist Heat/ Ice, Neuromuscular Re-ed, Patient Education, Self Care, Therapeutic Activities and Therapeutic Exercise.      Physical therapist and physical therapy assistant(s) met face to face to discuss patient's treatment plan and progress towards established goals. Pt will be seen by a physical therapist minimally every 6th visit or every 30 days.      Shazia Elkins, PT        "

## 2022-01-20 ENCOUNTER — CLINICAL SUPPORT (OUTPATIENT)
Dept: REHABILITATION | Facility: OTHER | Age: 72
End: 2022-01-20
Payer: MEDICARE

## 2022-01-20 DIAGNOSIS — R26.89 BALANCE PROBLEM: Primary | ICD-10-CM

## 2022-01-20 PROCEDURE — 97110 THERAPEUTIC EXERCISES: CPT | Mod: HCNC,PN

## 2022-01-20 PROCEDURE — 97112 NEUROMUSCULAR REEDUCATION: CPT | Mod: HCNC,PN

## 2022-01-20 NOTE — PROGRESS NOTES
"See updated PHYSICAL THERAPY POC, visit #6    Outpatient Therapy Updated Plan of Care     Visit Date: 1/20/2022  Name: Jessica Floyd  Clinic Number: [id number]    Therapy Diagnosis:   Encounter Diagnosis   Name Primary?    Balance problem Yes     Physician: Niyah Sheffield PA-C    Physician Orders: PT Eval and Treat   Medical Diagnosis from Referral: R26.89 (ICD-10-CM) - Balance problem   Evaluation Date: 1/4/2022  Authorization Period Expiration: 4/1/22  Plan of Care Expiration: 03/04/2022  Visit # / Visits authorized: 6/20  FOTO: #2/3 (52% on 1/13/2022)     Precautions: Standard and Fall     Total Visits Received: 6  Cancelled Visits: 0  No Show Visits: 0    Current Certification Period:  1/1/2022 to 4/1/2022  Visits from Evaluation Date:  6  Prior Level of Function: mild limitation, caring for 4 year old grand child, walking often without cane      Time in: 08:19am  Time out: 09:03am  Total time: 44 minutes      SUBJECTIVE   No comment on HOME EXERCISE PROGRAM today.   Response to previous treatment: continues to use SC and feel nervous about potentially falling   Function:  has noticed that she is negotiating stairs more confidently      Pain: 0/10  Location: Pt denies pain today     OBJECTIVE   1/20/2022:  Observation: Significant fear avoidance (of falling) during assessment and treatment today.  Positional and transitional tolerance impacted by dizziness/shortness of breath. L visual field begins around 30 degrees from midline.     Posture: forward flexed at hips with center of gravity forward past base of support.     Gait: walks with cane as "security blanket" on curbs      Range of Motion: AROM:  Hip Left  Right    Flexion 116 109      Knee Left  Right    Extension 0 0   Flexion 132 142      Ankle Left  Right    Dorsiflexion 7 6   Plantarflexion 51 40      Strength:  Hip Left  Right    Flexion 4/5 4/5   Abduction 4/5 in sitting  4/5 in sitting    Adduction 4/5 in sitting  4/5 in sitting   Extension " "3-  Difficulty clearing mat  4- with hip substitution movements       Knee Left  Right   Extension 4/5 4-/5      Ankle Left  Right    Dorsiflexion 4/5 4/5   Plantarflexion 25 heel raises with HRA  25 heel raises with HRA      Special Tests:    Left Right   Slump  Negative  Negative       Function:   Sit - stand:10 X in 30"   pushes with hands on chair      TUG:  Trial 1: 16", 16", CGA, short step length   Trial 2: 14", 15", CGA, short step length  Trial 3: 14", 12"CGA, short step length  Average: 14.67", 14.33"      Sensation: light touch sensation intact and equal bilaterally      Static Balance    Left  Right    SLS   Unsteadiness with B HRA Unsteadiness with B HRA    Rhomberg stand, EO, firm surface  25", 30"CGA , feet not completely touching        Rhomberg stand, EC, firm surface 7", 25" CGA, feet not completely toucing        Rhomberg srand, EO, unsteady surface 7", 18", 26" CGA        Rhomberg stand, EC, unsteady surface  Not appropriate to test at this time       Modified tandem stand    Unable to attempt - patient fearful and hesitant to let go of cane, also holding on to PT 30" CGA       Limitation/Restriction for FOTO Balance Survey     Therapist reviewed FOTO scores for Jessica Floyd on 1/13/2022.   FOTO documents entered into [employer] - see Media section.     Limitation Score: 52%  Predicted Score: 58%          TREATMENT   Charges based on 1-1 tx:  Therapeutic exercises for 25 minutes:  Heel raises, B HRA, 3x10  Standing hip abd, R/L, 3x10 repetitions  Standing hip extension 3x10 repetitions  Bridges 3x10 repetitions, 3" hold up  Supine Hip adduction ball squeeze 30x 5"  SL R/L clam shell, anne band, 3x10     Neuromuscular reeducation for 19 minutes:  Gait belt donned for all balance exercises  Alt LE pepper, 3" hold up R/L LE, x15   Step ups on 6" step with one HR CGA 3x10 reps  Step downs on 6" step with one HR CGA 3x10 reps  Side stepping with alternating shoulder taps 4x20" each CGA  Tandem walking " "4x20" each CGA  (initiate) static balance training   Static and dynamic balance during update to PHYSICAL THERAPY POC     Therapeutic activities for 8 minutes:  Sit <> stand and one foam cushion 3x8   (initiate) leg press machine: single limb, from low point, 2 black cords     PATIENT EDUCATION AND HOME EXERCISES      Home Exercises Provided and Patient Education Provided      Education provided:   PT educated pt on importance of compliance with their HEP this visit.      Written Home Exercises Provided: Patient instructed to cont prior HEP. Exercises were reviewed and Jessica was able to demonstrate them prior to the end of the session.  Jessica demonstrated good  understanding of the education provided. See EMR under Patient Instructions for exercises provided during therapy sessions     ASSESSMENT   Pt's fear avoidance is limiting her progress with PT tx and should continue to be addressed. Pt requires frequent redirection to participate in exercises rather than engage in conversation. Pt attempting to furniture walk in gym and exhibit feeling of loss of balance when sturdy upon assessment today. Plan to progress as pt tolerates.      Jessica is making fair progress towards meeting set goals.   Pt prognosis is Fair.      Pt will continue to benefit from skilled outpatient physical therapy to address the deficits listed in the problem list box on initial evaluation, provide pt/family education and to maximize pt's level of independence in the home and community environment.      Pt's spiritual, cultural and educational needs considered and pt agreeable to plan of care and goals.     Anticipated barriers to physical therapy: fear avoidance      Goals:  Short Term Goals: 4 weeks   (1) patient will be I with HOME EXERCISE PROGRAM (MET, 1/11/22)  (2) patient will complete 13 sit - stand transfers within 30" to facilitate improved transitional tolerance throughout ADLs (ongoing, not met, 1/20/2022)   (3) patient will " "complete 25 heel raises, no HRA, to facilitate improved static balance when reaching into upper cabinets (ongoing, not met, 1/20/2022)      Long Term Goals: 8 weeks   (1) patient will participate in modified tandem stand with L LE in front for 30" with CGA to minimize risk of fall (ongoing, not met, 1/20/2022)   (2) patient will participate in modified Rhomberg stand, EC, firm surface for 25" with CGA to minimize risk of fall (met, 1/20/2022)   (3) patient will average >13.5" TUG trials to facilitate decreased risk of fall during community mobility tasks (progressing, not met, 1/20/2022)     Previous Short Term Goals Status:  Ongoing   New Short Term Goals Status:  Continued per initial POC   Long Term Goal Status:   continue per initial plan of care.  Reasons for Recertification of Therapy:   Update PHYSICAL THERAPY POC     Plan     Updated Certification Period: 1/20/2022 to 03/20/2022  Outpatient Physical Therapy 2 times weekly for 4 weeks to include the following interventions: Gait Training, Manual Therapy, Moist Heat/ Ice, Neuromuscular Re-ed, Patient Education, Self Care, Therapeutic Activities and Therapeutic Exercise.      Physical therapist and physical therapy assistant(s) met face to face to discuss patient's treatment plan and progress towards established goals. Pt will be seen by a physical therapist minimally every 6th visit or every 30 days.    Mona Pierre, PT  1/20/2022      I CERTIFY THE NEED FOR THESE SERVICES FURNISHED UNDER THIS PLAN OF TREATMENT AND WHILE UNDER MY CARE    Physician's comments:        Physician's Signature: ___________________________________________________      "

## 2022-01-20 NOTE — PLAN OF CARE
"  Outpatient Therapy Updated Plan of Care     Visit Date: 1/20/2022  Name: Jessica Floyd  Clinic Number: [id number]    Therapy Diagnosis:   Encounter Diagnosis   Name Primary?    Balance problem Yes     Physician: Niyah Sheffield PA-C    Physician Orders: PT Eval and Treat   Medical Diagnosis from Referral: R26.89 (ICD-10-CM) - Balance problem   Evaluation Date: 1/4/2022  Authorization Period Expiration: 4/1/22  Plan of Care Expiration: 03/04/2022  Visit # / Visits authorized: 6/20  FOTO: #2/3 (52% on 1/13/2022)     Precautions: Standard and Fall     Total Visits Received: 6  Cancelled Visits: 0  No Show Visits: 0    Current Certification Period:  1/1/2022 to 4/1/2022  Visits from Evaluation Date:  6  Prior Level of Function: mild limitation, caring for 4 year old grand child, walking often without cane      Time in: 08:19am  Time out: 09:03am  Total time: 44 minutes      SUBJECTIVE   No comment on HOME EXERCISE PROGRAM today.   Response to previous treatment: continues to use SC and feel nervous about potentially falling   Function:  has noticed that she is negotiating stairs more confidently      Pain: 0/10  Location: Pt denies pain today     OBJECTIVE   1/20/2022:  Observation: Significant fear avoidance (of falling) during assessment and treatment today.  Positional and transitional tolerance impacted by dizziness/shortness of breath. L visual field begins around 30 degrees from midline.     Posture: forward flexed at hips with center of gravity forward past base of support.     Gait: walks with cane as "security blanket" on curbs      Range of Motion: AROM:  Hip Left  Right    Flexion 116 109      Knee Left  Right    Extension 0 0   Flexion 132 142      Ankle Left  Right    Dorsiflexion 7 6   Plantarflexion 51 40      Strength:  Hip Left  Right    Flexion 4/5 4/5   Abduction 4/5 in sitting  4/5 in sitting    Adduction 4/5 in sitting  4/5 in sitting   Extension 3-  Difficulty clearing mat  4- with hip " "substitution movements       Knee Left  Right   Extension 4/5 4-/5      Ankle Left  Right    Dorsiflexion 4/5 4/5   Plantarflexion 25 heel raises with HRA  25 heel raises with HRA      Special Tests:    Left Right   Slump  Negative  Negative       Function:   Sit - stand:10 X in 30"   pushes with hands on chair      TUG:  Trial 1: 16", 16", CGA, short step length   Trial 2: 14", 15", CGA, short step length  Trial 3: 14", 12"CGA, short step length  Average: 14.67", 14.33"      Sensation: light touch sensation intact and equal bilaterally      Static Balance    Left  Right    SLS   Unsteadiness with B HRA Unsteadiness with B HRA    Rhomberg stand, EO, firm surface  25", 30"CGA , feet not completely touching        Rhomberg stand, EC, firm surface 7", 25" CGA, feet not completely toucing        Rhomberg srand, EO, unsteady surface 7", 18", 26" CGA        Rhomberg stand, EC, unsteady surface  Not appropriate to test at this time       Modified tandem stand    Unable to attempt - patient fearful and hesitant to let go of cane, also holding on to PT 30" CGA       Limitation/Restriction for FOTO Balance Survey     Therapist reviewed FOTO scores for Jessica Floyd on 1/13/2022.   FOTO documents entered into [employer] - see Media section.     Limitation Score: 52%  Predicted Score: 58%          TREATMENT   Charges based on 1-1 tx:  Therapeutic exercises for 25 minutes:  Heel raises, B HRA, 3x10  Standing hip abd, R/L, 3x10 repetitions  Standing hip extension 3x10 repetitions  Bridges 3x10 repetitions, 3" hold up  Supine Hip adduction ball squeeze 30x 5"  SL R/L clam shell, anne band, 3x10     Neuromuscular reeducation for 19 minutes:  Gait belt donned for all balance exercises  Alt LE marches, 3" hold up R/L LE, x15   Step ups on 6" step with one HR CGA 3x10 reps  Step downs on 6" step with one HR CGA 3x10 reps  Side stepping with alternating shoulder taps 4x20" each CGA  Tandem walking 4x20" each CGA  (initiate) static balance " "training   Static and dynamic balance during update to PHYSICAL THERAPY POC     Therapeutic activities for 8 minutes:  Sit <> stand and one foam cushion 3x8   (initiate) leg press machine: single limb, from low point, 2 black cords     PATIENT EDUCATION AND HOME EXERCISES      Home Exercises Provided and Patient Education Provided      Education provided:   PT educated pt on importance of compliance with their HEP this visit.      Written Home Exercises Provided: Patient instructed to cont prior HEP. Exercises were reviewed and Jessica was able to demonstrate them prior to the end of the session.  Jessica demonstrated good  understanding of the education provided. See EMR under Patient Instructions for exercises provided during therapy sessions     ASSESSMENT   Pt's fear avoidance is limiting her progress with PT tx and should continue to be addressed. Pt requires frequent redirection to participate in exercises rather than engage in conversation. Pt attempting to furniture walk in gym and exhibit feeling of loss of balance when sturdy upon assessment today. Plan to progress as pt tolerates.      Jessica is making fair progress towards meeting set goals.   Pt prognosis is Fair.      Pt will continue to benefit from skilled outpatient physical therapy to address the deficits listed in the problem list box on initial evaluation, provide pt/family education and to maximize pt's level of independence in the home and community environment.      Pt's spiritual, cultural and educational needs considered and pt agreeable to plan of care and goals.     Anticipated barriers to physical therapy: fear avoidance      Goals:  Short Term Goals: 4 weeks   (1) patient will be I with HOME EXERCISE PROGRAM (MET, 1/11/22)  (2) patient will complete 13 sit - stand transfers within 30" to facilitate improved transitional tolerance throughout ADLs (ongoing, not met, 1/20/2022)   (3) patient will complete 25 heel raises, no HRA, to facilitate " "improved static balance when reaching into upper cabinets (ongoing, not met, 1/20/2022)      Long Term Goals: 8 weeks   (1) patient will participate in modified tandem stand with L LE in front for 30" with CGA to minimize risk of fall (ongoing, not met, 1/20/2022)   (2) patient will participate in modified Rhomberg stand, EC, firm surface for 25" with CGA to minimize risk of fall (met, 1/20/2022)   (3) patient will average >13.5" TUG trials to facilitate decreased risk of fall during community mobility tasks (progressing, not met, 1/20/2022)     Previous Short Term Goals Status:  Ongoing   New Short Term Goals Status:  Continued per initial POC   Long Term Goal Status:   continue per initial plan of care.  Reasons for Recertification of Therapy:   Update PHYSICAL THERAPY POC     Plan     Updated Certification Period: 1/20/2022 to 03/20/2022  Outpatient Physical Therapy 2 times weekly for 4 weeks to include the following interventions: Gait Training, Manual Therapy, Moist Heat/ Ice, Neuromuscular Re-ed, Patient Education, Self Care, Therapeutic Activities and Therapeutic Exercise.      Physical therapist and physical therapy assistant(s) met face to face to discuss patient's treatment plan and progress towards established goals. Pt will be seen by a physical therapist minimally every 6th visit or every 30 days.    Mona Pierre, PT  1/20/2022      I CERTIFY THE NEED FOR THESE SERVICES FURNISHED UNDER THIS PLAN OF TREATMENT AND WHILE UNDER MY CARE    Physician's comments:        Physician's Signature: ___________________________________________________    "

## 2022-01-25 ENCOUNTER — CLINICAL SUPPORT (OUTPATIENT)
Dept: REHABILITATION | Facility: OTHER | Age: 72
End: 2022-01-25
Payer: MEDICARE

## 2022-01-25 DIAGNOSIS — R26.89 BALANCE PROBLEM: ICD-10-CM

## 2022-01-25 PROCEDURE — 97110 THERAPEUTIC EXERCISES: CPT | Mod: HCNC,PN,CQ

## 2022-01-25 PROCEDURE — 97112 NEUROMUSCULAR REEDUCATION: CPT | Mod: HCNC,PN,CQ

## 2022-01-25 NOTE — PROGRESS NOTES
"  Physical Therapy Daily Treatment Note     Visit Date: 1/25/2022  Name: Jessica Floyd  Clinic Number: [id number]    Therapy Diagnosis:   Encounter Diagnosis   Name Primary?    Balance problem      Physician: Niyah Sheffield PA-C    Physician Orders: PT Eval and Treat   Medical Diagnosis from Referral: R26.89 (ICD-10-CM) - Balance problem   Evaluation Date: 1/4/2022  Authorization Period Expiration: 4/1/22  Plan of Care Expiration: 03/04/2022  Visit # / Visits authorized: 7/20  FOTO: #2/3 (52% on 1/13/2022)     Precautions: Standard and Fall     Total Visits Received: 7  Cancelled Visits: 0  No Show Visits: 0    Current Certification Period:  1/1/2022 to 4/1/2022  Visits from Evaluation Date:  7  Prior Level of Function: mild limitation, caring for 4 year old grand child, walking often without cane      Time in: 0800  Time out: 0900  Total time: 60 minutes      SUBJECTIVE   States she feels like her balance is improving however she is nervous about falls due to her syncope.     She reports compliance with  HOME EXERCISE PROGRAM today.   Response to previous treatment: felt fine, she does not like the foam for balance.  Function:  has noticed that she is negotiating stairs more confidently      Pain: 0/10  Location: Pt denies pain today     OBJECTIVE   1/20/2022:  Observation: Significant fear avoidance (of falling) during assessment and treatment today.  Positional and transitional tolerance impacted by dizziness/shortness of breath. L visual field begins around 30 degrees from midline.     Posture: forward flexed at hips with center of gravity forward past base of support.     Gait: walks with cane as "security blanket" on curbs      Range of Motion: AROM:  Hip Left  Right    Flexion 116 109      Knee Left  Right    Extension 0 0   Flexion 132 142      Ankle Left  Right    Dorsiflexion 7 6   Plantarflexion 51 40      Strength:  Hip Left  Right    Flexion 4/5 4/5   Abduction 4/5 in sitting  4/5 in sitting  " "  Adduction 4/5 in sitting  4/5 in sitting   Extension 3-  Difficulty clearing mat  4- with hip substitution movements       Knee Left  Right   Extension 4/5 4-/5      Ankle Left  Right    Dorsiflexion 4/5 4/5   Plantarflexion 25 heel raises with HRA  25 heel raises with HRA      Special Tests:    Left Right   Slump  Negative  Negative       Function:   Sit - stand:10 X in 30"   pushes with hands on chair      TUG:  Trial 1: 16", 16", CGA, short step length   Trial 2: 14", 15", CGA, short step length  Trial 3: 14", 12"CGA, short step length  Average: 14.67", 14.33"      Sensation: light touch sensation intact and equal bilaterally      Static Balance    Left  Right    SLS   Unsteadiness with B HRA Unsteadiness with B HRA    Rhomberg stand, EO, firm surface  25", 30"CGA , feet not completely touching        Rhomberg stand, EC, firm surface 7", 25" CGA, feet not completely toucing        Rhomberg srand, EO, unsteady surface 7", 18", 26" CGA        Rhomberg stand, EC, unsteady surface  Not appropriate to test at this time       Modified tandem stand    Unable to attempt - patient fearful and hesitant to let go of cane, also holding on to PT 30" CGA       Limitation/Restriction for FOTO Balance Survey     Therapist reviewed FOTO scores for Jessica Floyd on 1/13/2022.   FOTO documents entered into [employer] - see Media section.     Limitation Score: 52%  Predicted Score: 58%          TREATMENT   Charges based on 1-1 tx:    Therapeutic exercises for 30 minutes:  Heel raises, B HRA, 3x10  Standing hip abd, R/L, 3x10 repetitions  Standing hip extension 3x10 repetitions  Bridges 3x10 repetitions, 3" hold up  Supine Hip adduction ball squeeze 30x 5"  SL R/L clam shell, anne band, 3x10   +SLR 3x10 R/L    Neuromuscular reeducation for 22 minutes:  Gait belt donned for all balance exercises  Alt KYE pabon, 3" hold up R/L LE, x15   Step ups on 6" step with one HR CGA 3x10 reps  Step downs on 6" step with one HR CGA 3x10 reps  +NBOS " "on airex foam EO/EC 3x30" ea  +Marching on airex form 3x30" - fingertip support prn  +Sharpened Rhomberg stance 2x30" ea alt LE  Side stepping with alternating shoulder taps 4x20" each CGA  Tandem walking 4x20" each CGA  (initiate) static balance training        Therapeutic activities for 6 minutes:  Sit <> stand and one foam cushion 3x8   (initiate) leg press machine: single limb, from low point, 2 black cords     PATIENT EDUCATION AND HOME EXERCISES      Home Exercises Provided and Patient Education Provided      Education provided:   -balance systems      Written Home Exercises Provided: Patient instructed to cont prior HEP. Exercises were reviewed and Jessica was able to demonstrate them prior to the end of the session.  Jessica demonstrated good  understanding of the education provided. See EMR under Patient Instructions for exercises provided during therapy sessions     ASSESSMENT   Pt's fear avoidance is limiting her progress with PT tx and should continue to be addressed. Pt demonstrates decreased balance/proprioception during dynamic exercises with reduced ASHLEY. Pt appears to rely heavily on the visual system for maintaining  balance as increased postural sway continues to be present with EC vs EO as well as moderate apprehension with attempting new exercises. Emphasis was focused on improving postural stability over ASHLEY while improving awareness of LE in relation to trunk / ambulating surfaces. Pt requires verbal/tactile cues to initiate exercise and to correct form.        Jessica is making fair progress towards meeting set goals.   Pt prognosis is Fair.      Pt will continue to benefit from skilled outpatient physical therapy to address the deficits listed in the problem list box on initial evaluation, provide pt/family education and to maximize pt's level of independence in the home and community environment.      Pt's spiritual, cultural and educational needs considered and pt agreeable to plan of care and " "goals.     Anticipated barriers to physical therapy: fear avoidance      Goals:  Short Term Goals: 4 weeks   (1) patient will be I with HOME EXERCISE PROGRAM (MET, 1/11/22)  (2) patient will complete 13 sit - stand transfers within 30" to facilitate improved transitional tolerance throughout ADLs (ongoing, not met, 1/20/2022)   (3) patient will complete 25 heel raises, no HRA, to facilitate improved static balance when reaching into upper cabinets (ongoing, not met, 1/20/2022)      Long Term Goals: 8 weeks   (1) patient will participate in modified tandem stand with L LE in front for 30" with CGA to minimize risk of fall (ongoing, not met, 1/20/2022)   (2) patient will participate in modified Rhomberg stand, EC, firm surface for 25" with CGA to minimize risk of fall (met, 1/20/2022)   (3) patient will average >13.5" TUG trials to facilitate decreased risk of fall during community mobility tasks (progressing, not met, 1/20/2022)     Previous Short Term Goals Status:  Ongoing   New Short Term Goals Status:  Continued per initial POC   Long Term Goal Status:   continue per initial plan of care.  Reasons for Recertification of Therapy:   Update PHYSICAL THERAPY POC     Plan       Focus on improving balance/proprioception exercises with emphasis on ADL performance.       Updated Certification Period: 1/25/2022 to 03/20/2022  Outpatient Physical Therapy 2 times weekly for 4 weeks to include the following interventions: Gait Training, Manual Therapy, Moist Heat/ Ice, Neuromuscular Re-ed, Patient Education, Self Care, Therapeutic Activities and Therapeutic Exercise.          Harsha Benitez, PTA  1/25/2022            "

## 2022-01-27 ENCOUNTER — OFFICE VISIT (OUTPATIENT)
Dept: INTERNAL MEDICINE | Facility: CLINIC | Age: 72
End: 2022-01-27
Payer: MEDICARE

## 2022-01-27 DIAGNOSIS — J01.90 ACUTE RHINOSINUSITIS: Primary | ICD-10-CM

## 2022-01-27 PROCEDURE — 1159F MED LIST DOCD IN RCRD: CPT | Mod: HCNC,CPTII,95, | Performed by: PHYSICIAN ASSISTANT

## 2022-01-27 PROCEDURE — 1159F PR MEDICATION LIST DOCUMENTED IN MEDICAL RECORD: ICD-10-PCS | Mod: HCNC,CPTII,95, | Performed by: PHYSICIAN ASSISTANT

## 2022-01-27 PROCEDURE — 99442 PR PHYSICIAN TELEPHONE EVALUATION 11-20 MIN: CPT | Mod: HCNC,95,, | Performed by: PHYSICIAN ASSISTANT

## 2022-01-27 PROCEDURE — 1160F PR REVIEW ALL MEDS BY PRESCRIBER/CLIN PHARMACIST DOCUMENTED: ICD-10-PCS | Mod: HCNC,CPTII,95, | Performed by: PHYSICIAN ASSISTANT

## 2022-01-27 PROCEDURE — 99499 NO LOS: ICD-10-PCS | Mod: HCNC,95,, | Performed by: PHYSICIAN ASSISTANT

## 2022-01-27 PROCEDURE — 1160F RVW MEDS BY RX/DR IN RCRD: CPT | Mod: HCNC,CPTII,95, | Performed by: PHYSICIAN ASSISTANT

## 2022-01-27 PROCEDURE — 99499 UNLISTED E&M SERVICE: CPT | Mod: HCNC,95,, | Performed by: PHYSICIAN ASSISTANT

## 2022-01-27 PROCEDURE — 99442 PR PHYSICIAN TELEPHONE EVALUATION 11-20 MIN: ICD-10-PCS | Mod: HCNC,95,, | Performed by: PHYSICIAN ASSISTANT

## 2022-01-27 RX ORDER — AMOXICILLIN AND CLAVULANATE POTASSIUM 875; 125 MG/1; MG/1
1 TABLET, FILM COATED ORAL 2 TIMES DAILY
Qty: 20 TABLET | Refills: 0 | Status: SHIPPED | OUTPATIENT
Start: 2022-01-27 | End: 2022-02-06

## 2022-01-27 RX ORDER — PREDNISONE 20 MG/1
40 TABLET ORAL DAILY
Qty: 6 TABLET | Refills: 0 | Status: SHIPPED | OUTPATIENT
Start: 2022-01-27 | End: 2022-01-30

## 2022-01-27 NOTE — PROGRESS NOTES
Note created in error. MICAELA CHOPRA   Answers for HPI/ROS submitted by the patient on 1/27/2022  activity change: No  unexpected weight change: No  neck pain: No  hearing loss: No  rhinorrhea: Yes  trouble swallowing: No  eye discharge: No  visual disturbance: No  chest tightness: No  wheezing: No  chest pain: No  palpitations: No  blood in stool: No  constipation: No  vomiting: No  diarrhea: No  polydipsia: No  polyuria: No  difficulty urinating: No  hematuria: No  menstrual problem: No  dysuria: No  joint swelling: No  arthralgias: No  headaches: Yes  weakness: No  confusion: No  dysphoric mood: No

## 2022-01-27 NOTE — PROGRESS NOTES
Established Patient - Audio Only Telehealth Visit     The patient location is: home  The chief complaint leading to consultation is: congestion and headache  Visit type: Virtual visit with audio only (telephone)  Total time spent with patient: 15       The reason for the audio only service rather than synchronous audio and video virtual visit was related to technical difficulties or patient preference/necessity.     Each patient to whom I provide medical services by telemedicine is:  (1) informed of the relationship between the physician and patient and the respective role of any other health care provider with respect to management of the patient; and (2) notified that they may decline to receive medical services by telemedicine and may withdraw from such care at any time. Patient verbally consented to receive this service via voice-only telephone call.       HPI: Pt reports with congestion and HA that started on Tuesday 100.1-100.7. Three home COVID tests were negative. No chest pain or SOB. She denies dizziness or vision changes. Still doing PT BID at [facility]. No cough. She reports that she tried benadryl with no relief.        Assessment and plan:  Cover with abx and prednisone as below. Pt educated on symptom mgmt. RTC for symptom re-check in 2-3 weeks, sooner if needed. ED prompts discussed.        This service was not originating from a related E/M service provided within the previous 7 days nor will  to an E/M service or procedure within the next 24 hours or my soonest available appointment.  Prevailing standard of care was able to be met in this audio-only visit.        Answers for HPI/ROS submitted by the patient on 1/27/2022  activity change: No  unexpected weight change: No  neck pain: No  hearing loss: No  rhinorrhea: Yes  trouble swallowing: No  eye discharge: No  visual disturbance: No  chest tightness: No  wheezing: No  chest pain: No  palpitations: No  blood in stool: No  constipation:  No  vomiting: No  diarrhea: No  polydipsia: No  polyuria: No  difficulty urinating: No  hematuria: No  menstrual problem: No  dysuria: No  joint swelling: No  arthralgias: No  headaches: Yes  weakness: No  confusion: No  dysphoric mood: No

## 2022-01-28 ENCOUNTER — CLINICAL SUPPORT (OUTPATIENT)
Dept: REHABILITATION | Facility: OTHER | Age: 72
End: 2022-01-28
Payer: MEDICARE

## 2022-01-28 DIAGNOSIS — R26.89 BALANCE PROBLEM: ICD-10-CM

## 2022-01-28 PROCEDURE — 97112 NEUROMUSCULAR REEDUCATION: CPT | Mod: HCNC,PN,CQ

## 2022-01-28 PROCEDURE — 97110 THERAPEUTIC EXERCISES: CPT | Mod: HCNC,PN,CQ

## 2022-01-28 NOTE — PROGRESS NOTES
"  Physical Therapy Daily Treatment Note     Visit Date: 1/28/2022  Name: Jessica Floyd  Clinic Number: [id number]    Therapy Diagnosis:   Encounter Diagnosis   Name Primary?    Balance problem      Physician: Niyah Sheffield PA-C    Physician Orders: PT Eval and Treat   Medical Diagnosis from Referral: R26.89 (ICD-10-CM) - Balance problem   Evaluation Date: 1/4/2022  Authorization Period Expiration: 4/1/22  Plan of Care Expiration: 03/04/2022  Visit # / Visits authorized: 8/20  FOTO: #2/3 (52% on 1/13/2022)     Precautions: Standard and Fall     Total Visits Received: 8  Cancelled Visits: 0  No Show Visits: 0    Current Certification Period:  1/1/2022 to 4/1/2022  Visits from Evaluation Date:  8  Prior Level of Function: mild limitation, caring for 4 year old grand child, walking often without cane      Time in: 0800  Time out: 0900  Total time: 53 minutes      SUBJECTIVE   States she is getting over a sinus infection and would like to keep therapy light as she is not feeling well.   She reports compliance with  HOME EXERCISE PROGRAM today.   Response to previous treatment: felt fine, she does not like the foam for balance.  Function:  has noticed that she is negotiating stairs more confidently      Pain: 0/10  Location: Pt denies pain today     OBJECTIVE   1/20/2022:  Observation: Significant fear avoidance (of falling) during assessment and treatment today.  Positional and transitional tolerance impacted by dizziness/shortness of breath. L visual field begins around 30 degrees from midline.     Posture: forward flexed at hips with center of gravity forward past base of support.     Gait: walks with cane as "security blanket" on curbs      Range of Motion: AROM:  Hip Left  Right    Flexion 116 109      Knee Left  Right    Extension 0 0   Flexion 132 142      Ankle Left  Right    Dorsiflexion 7 6   Plantarflexion 51 40      Strength:  Hip Left  Right    Flexion 4/5 4/5   Abduction 4/5 in sitting  4/5 in " "sitting    Adduction 4/5 in sitting  4/5 in sitting   Extension 3-  Difficulty clearing mat  4- with hip substitution movements       Knee Left  Right   Extension 4/5 4-/5      Ankle Left  Right    Dorsiflexion 4/5 4/5   Plantarflexion 25 heel raises with HRA  25 heel raises with HRA      Special Tests:    Left Right   Slump  Negative  Negative       Function:   Sit - stand:10 X in 30"   pushes with hands on chair      TUG:  Trial 1: 16", 16", CGA, short step length   Trial 2: 14", 15", CGA, short step length  Trial 3: 14", 12"CGA, short step length  Average: 14.67", 14.33"      Sensation: light touch sensation intact and equal bilaterally      Static Balance    Left  Right    SLS   Unsteadiness with B HRA Unsteadiness with B HRA    Rhomberg stand, EO, firm surface  25", 30"CGA , feet not completely touching        Rhomberg stand, EC, firm surface 7", 25" CGA, feet not completely toucing        Rhomberg srand, EO, unsteady surface 7", 18", 26" CGA        Rhomberg stand, EC, unsteady surface  Not appropriate to test at this time       Modified tandem stand    Unable to attempt - patient fearful and hesitant to let go of cane, also holding on to PT 30" CGA       Limitation/Restriction for FOTO Balance Survey     Therapist reviewed FOTO scores for Jessica Floyd on 1/13/2022.   FOTO documents entered into [employer] - see Media section.     Limitation Score: 52%  Predicted Score: 58%          TREATMENT   Charges based on 1-1 tx:    Therapeutic exercises for 30 minutes:  Heel raises, B HRA, 3x10  Standing hip abd, R/L, 3x10 repetitions  Standing hip extension 3x10 repetitions  Bridges 3x10 repetitions, 3" hold up  Supine Hip adduction ball squeeze 30x 5"   SL R/L clam shell, OTB, 3x10 - supine today 2* hip pain  SLR 3x10 R/L  LTR red ball 3'    Neuromuscular reeducation for 23 minutes:  Gait belt donned for all balance exercises  Alt LE marches, 3" hold up R/L LE, x15   Step ups on 6" step with one HR CGA 3x10 reps   Step downs " "on 6" step with one HR CGA 3x10 reps  NBOS on airex foam EO/EC 3x30" ea  Marching on airex form 3x30" - fingertip support prn  Sharpened Rhomberg stance 2x30" ea alt LE   Side stepping with alternating shoulder taps 4x20" each CGA  Tandem walking 4x20" each CGA  (initiate) static balance training        Therapeutic activities for 00 minutes:  Sit <> stand and one foam cushion 3x8   (initiate) leg press machine: single limb, from low point, 2 black cords     PATIENT EDUCATION AND HOME EXERCISES      Home Exercises Provided and Patient Education Provided      Education provided:   -balance systems      Written Home Exercises Provided: Patient instructed to cont prior HEP. Exercises were reviewed and Jessica was able to demonstrate them prior to the end of the session.  Jessica demonstrated good  understanding of the education provided. See EMR under Patient Instructions for exercises provided during therapy sessions     ASSESSMENT   Pt tolerated exercise well. Balance /proprioception deficits continue to remain notable during dynamic exercises with reduced ASHLEY. Apprehension continues to be noted with balance activizes with frequent cuing required to step outside of comfort zone. Exercises were deferred this visit per pt request, will attempt to resume next visit.         Jessica is making fair progress towards meeting set goals.   Pt prognosis is Fair.      Pt will continue to benefit from skilled outpatient physical therapy to address the deficits listed in the problem list box on initial evaluation, provide pt/family education and to maximize pt's level of independence in the home and community environment.      Pt's spiritual, cultural and educational needs considered and pt agreeable to plan of care and goals.     Anticipated barriers to physical therapy: fear avoidance      Goals:  Short Term Goals: 4 weeks   (1) patient will be I with HOME EXERCISE PROGRAM (MET, 1/11/22)  (2) patient will complete 13 sit - stand " "transfers within 30" to facilitate improved transitional tolerance throughout ADLs (ongoing, not met, 1/20/2022)   (3) patient will complete 25 heel raises, no HRA, to facilitate improved static balance when reaching into upper cabinets (ongoing, not met, 1/20/2022)      Long Term Goals: 8 weeks   (1) patient will participate in modified tandem stand with L LE in front for 30" with CGA to minimize risk of fall (ongoing, not met, 1/20/2022)   (2) patient will participate in modified Rhomberg stand, EC, firm surface for 25" with CGA to minimize risk of fall (met, 1/20/2022)   (3) patient will average >13.5" TUG trials to facilitate decreased risk of fall during community mobility tasks (progressing, not met, 1/20/2022)     Previous Short Term Goals Status:  Ongoing   New Short Term Goals Status:  Continued per initial POC   Long Term Goal Status:   continue per initial plan of care.  Reasons for Recertification of Therapy:   Update PHYSICAL THERAPY POC     Plan       Focus on improving balance/proprioception exercises with emphasis on ADL performance.       Updated Certification Period: 1/28/2022 to 03/20/2022  Outpatient Physical Therapy 2 times weekly for 4 weeks to include the following interventions: Gait Training, Manual Therapy, Moist Heat/ Ice, Neuromuscular Re-ed, Patient Education, Self Care, Therapeutic Activities and Therapeutic Exercise.          Harsha Benitez, PTA  1/28/2022              "

## 2022-01-31 ENCOUNTER — CLINICAL SUPPORT (OUTPATIENT)
Dept: REHABILITATION | Facility: OTHER | Age: 72
End: 2022-01-31
Payer: MEDICARE

## 2022-01-31 DIAGNOSIS — R26.89 BALANCE PROBLEM: Primary | ICD-10-CM

## 2022-01-31 PROCEDURE — 97530 THERAPEUTIC ACTIVITIES: CPT | Mod: HCNC,PN

## 2022-01-31 PROCEDURE — 97112 NEUROMUSCULAR REEDUCATION: CPT | Mod: HCNC,PN

## 2022-01-31 NOTE — PROGRESS NOTES
"  Physical Therapy Daily Treatment Note     Visit Date: 1/31/2022  Name: Jessica Floyd  Clinic Number: [id number]    Therapy Diagnosis:   Encounter Diagnosis   Name Primary?    Balance problem Yes     Physician: Niyah Sheffield PA-C    Physician Orders: PT Eval and Treat   Medical Diagnosis from Referral: R26.89 (ICD-10-CM) - Balance problem   Evaluation Date: 1/4/2022  Authorization Period Expiration: 4/1/22  Plan of Care Expiration: 03/04/2022  Visit # / Visits authorized: 8/20  FOTO: #2/3 (52% on 1/13/2022)     Precautions: Standard and Fall     Prior Level of Function: mild limitation, caring for 4 year old grand child, walking often without cane      Time in: 08:00am  Time out: 09:00am  Total time: 60 minutes      SUBJECTIVE   She reports compliance with  HOME EXERCISE PROGRAM.  Response to previous treatment: feels like she does better in PHYSICAL THERAPY after practicing the exercises at home   Function:  has noticed that she is negotiating stairs more confidently, continues with use of cane      Pain: 0/10  Location: Pt denies pain today     OBJECTIVE   1/20/2022:  Observation: Significant fear avoidance (of falling) during assessment and treatment today.  Positional and transitional tolerance impacted by dizziness/shortness of breath. L visual field begins around 30 degrees from midline.     Posture: forward flexed at hips with center of gravity forward past base of support.     Gait: walks with cane as "security blanket" on curbs      Range of Motion: AROM:  Hip Left  Right    Flexion 116 109      Knee Left  Right    Extension 0 0   Flexion 132 142      Ankle Left  Right    Dorsiflexion 7 6   Plantarflexion 51 40      Strength:  Hip Left  Right    Flexion 4/5 4/5   Abduction 4/5 in sitting  4/5 in sitting    Adduction 4/5 in sitting  4/5 in sitting   Extension 3-  Difficulty clearing mat  4- with hip substitution movements       Knee Left  Right   Extension 4/5 4-/5      Ankle Left  Right  " "  Dorsiflexion 4/5 4/5   Plantarflexion 25 heel raises with HRA  25 heel raises with HRA      Special Tests:    Left Right   Slump  Negative  Negative       Function:   Sit - stand:10 X in 30"   pushes with hands on chair      TUG:  Trial 1: 16", 16", CGA, short step length   Trial 2: 14", 15", CGA, short step length  Trial 3: 14", 12"CGA, short step length  Average: 14.67", 14.33"      Sensation: light touch sensation intact and equal bilaterally      Static Balance    Left  Right    SLS   Unsteadiness with B HRA Unsteadiness with B HRA    Rhomberg stand, EO, firm surface  25", 30"CGA , feet not completely touching        Rhomberg stand, EC, firm surface 7", 25" CGA, feet not completely toucing        Rhomberg srand, EO, unsteady surface 7", 18", 26" CGA        Rhomberg stand, EC, unsteady surface  Not appropriate to test at this time       Modified tandem stand    Unable to attempt - patient fearful and hesitant to let go of cane, also holding on to PT 30" CGA       Limitation/Restriction for FOTO Balance Survey     Therapist reviewed FOTO scores for Jessica Floyd on 1/13/2022.   FOTO documents entered into [employer] - see Media section.     Limitation Score: 52%  Predicted Score: 58%          TREATMENT   Charges based on 1-1 tx:  Therapeutic exercises for 30 minutes:  Heel raises, B HRA, 3x10  Standing hip abd, R/L, 3x10 repetitions  Standing hip extension 3x10 repetitions  Bridges 3x10 repetitions, 3" hold up  Supine Hip adduction ball squeeze 30x 5"   SL R/L clam shell, OTB, 3x10 - supine today 2* hip pain  SLR 3x10 R/L  LTR red ball 3'    Neuromuscular reeducation for 25 minutes:  Gait belt donned for all balance exercises  Alt LE marches, hand to opposite knee, 2 laps   Step ups on 6" step with one HR CGA 3x10 reps   Step downs on 6" step with one HR CGA 3x10 reps  NBOS on airex foam EO/EC 3x30" ea  Marching on airex form 3x30" - fingertip support prn  Sharpened Rhomberg stance 2x30" ea alt LE   Side stepping with " "alternating shoulder taps 4x20" each CGA  Tandem stand and weight shift for bowling, R/L LE leading   Standing on airex mat, catching/tossing ball, 4x5      Therapeutic activities for 25 minutes:  Sit <> stand and one foam cushion 3x10, no weight in hand -> #2 weight in R hand -> #2 weight in L hand  Leg press machine: double limb -> single limb, from low point, 2 black cords     PATIENT EDUCATION AND HOME EXERCISES      Home Exercises Provided and Patient Education Provided      Education provided:   -balance systems      Written Home Exercises Provided: Patient instructed to cont prior HEP. Exercises were reviewed and Jessica was able to demonstrate them prior to the end of the session.  Jessica demonstrated good  understanding of the education provided. See EMR under Patient Instructions for exercises provided during therapy sessions     ASSESSMENT   Patient is agreeable to participate in modified tandem stand via tossing ball to cones activity and she practices balance on foam with narrow ASHLEY during ball catch/toss activity. She continues with limitation due to fear avoidance and does present with unsteadiness more dynamically. Leg press machine initiated today for LE strengthening and coordination with good response.     Jessica is making fair progress towards meeting set goals.   Pt prognosis is Fair.      Pt will continue to benefit from skilled outpatient physical therapy to address the deficits listed in the problem list box on initial evaluation, provide pt/family education and to maximize pt's level of independence in the home and community environment.      Pt's spiritual, cultural and educational needs considered and pt agreeable to plan of care and goals.     Anticipated barriers to physical therapy: fear avoidance      Goals:  Short Term Goals: 4 weeks   (1) patient will be I with HOME EXERCISE PROGRAM (MET, 1/11/22)  (2) patient will complete 13 sit - stand transfers within 30" to facilitate improved " "transitional tolerance throughout ADLs (ongoing, not met, 1/20/2022)   (3) patient will complete 25 heel raises, no HRA, to facilitate improved static balance when reaching into upper cabinets (ongoing, not met, 1/20/2022)      Long Term Goals: 8 weeks   (1) patient will participate in modified tandem stand with L LE in front for 30" with CGA to minimize risk of fall (ongoing, not met, 1/20/2022)   (2) patient will participate in modified Rhomberg stand, EC, firm surface for 25" with CGA to minimize risk of fall (met, 1/20/2022)   (3) patient will average >13.5" TUG trials to facilitate decreased risk of fall during community mobility tasks (progressing, not met, 1/20/2022)     Plan   Focus on improving balance/proprioception exercises with emphasis on ADL performance.     Updated Certification Period: 1/31/2022 to 03/20/2022  Outpatient Physical Therapy 2 times weekly for 4 weeks to include the following interventions: Gait Training, Manual Therapy, Moist Heat/ Ice, Neuromuscular Re-ed, Patient Education, Self Care, Therapeutic Activities and Therapeutic Exercise.          Mona Pierre, PT  1/31/2022                "

## 2022-02-02 ENCOUNTER — CLINICAL SUPPORT (OUTPATIENT)
Dept: REHABILITATION | Facility: OTHER | Age: 72
End: 2022-02-02
Payer: MEDICARE

## 2022-02-02 DIAGNOSIS — R26.89 BALANCE PROBLEM: Primary | ICD-10-CM

## 2022-02-02 PROCEDURE — 97530 THERAPEUTIC ACTIVITIES: CPT | Mod: HCNC,PN

## 2022-02-02 PROCEDURE — 97112 NEUROMUSCULAR REEDUCATION: CPT | Mod: HCNC,PN

## 2022-02-02 PROCEDURE — 97110 THERAPEUTIC EXERCISES: CPT | Mod: HCNC,PN

## 2022-02-02 NOTE — PROGRESS NOTES
"  Physical Therapy Daily Treatment Note     Visit Date: 2/2/2022  Name: Jessica Floyd  Clinic Number: [id number]    Therapy Diagnosis:   Encounter Diagnosis   Name Primary?    Balance problem Yes     Physician: Niyah Sheffield PA-C    Physician Orders: PT Eval and Treat   Medical Diagnosis from Referral: R26.89 (ICD-10-CM) - Balance problem   Evaluation Date: 1/4/2022  Authorization Period Expiration: 4/1/22  Plan of Care Expiration: 03/04/2022  Visit # / Visits authorized: 10/20  FOTO: #2/3 (52% on 1/13/2022)     Precautions: Standard and Fall     Prior Level of Function: mild limitation, caring for 4 year old grand child, walking often without cane      Time in: 08:00am  Time out: 09:00am  Total time: 60 minutes      SUBJECTIVE   Pt reports that her balance is improving and she is "practicing safely" at home. Pt reports that she only slept for one hour last night 2/2 family emergency and will try her best today.     She reports compliance with  HOME EXERCISE PROGRAM.  Response to previous treatment: feels like she does better in PHYSICAL THERAPY after practicing the exercises at home   Function:  has noticed that she is negotiating stairs more confidently, continues with use of cane      Pain: 0/10  Location: Pt denies pain today     OBJECTIVE   1/20/2022:  Observation: Significant fear avoidance (of falling) during assessment and treatment today.  Positional and transitional tolerance impacted by dizziness/shortness of breath. L visual field begins around 30 degrees from midline.     Posture: forward flexed at hips with center of gravity forward past base of support.     Gait: walks with cane as "security blanket" on curbs      Range of Motion: AROM:  Hip Left  Right    Flexion 116 109      Knee Left  Right    Extension 0 0   Flexion 132 142      Ankle Left  Right    Dorsiflexion 7 6   Plantarflexion 51 40      Strength:  Hip Left  Right    Flexion 4/5 4/5   Abduction 4/5 in sitting  4/5 in sitting  " "  Adduction 4/5 in sitting  4/5 in sitting   Extension 3-  Difficulty clearing mat  4- with hip substitution movements       Knee Left  Right   Extension 4/5 4-/5      Ankle Left  Right    Dorsiflexion 4/5 4/5   Plantarflexion 25 heel raises with HRA  25 heel raises with HRA      Special Tests:    Left Right   Slump  Negative  Negative       Function:   Sit - stand:10 X in 30"   pushes with hands on chair      TUG:  Trial 1: 16", 16", CGA, short step length   Trial 2: 14", 15", CGA, short step length  Trial 3: 14", 12"CGA, short step length  Average: 14.67", 14.33"      Sensation: light touch sensation intact and equal bilaterally      Static Balance    Left  Right    SLS   Unsteadiness with B HRA Unsteadiness with B HRA    Rhomberg stand, EO, firm surface  25", 30"CGA , feet not completely touching        Rhomberg stand, EC, firm surface 7", 25" CGA, feet not completely toucing        Rhomberg srand, EO, unsteady surface 7", 18", 26" CGA        Rhomberg stand, EC, unsteady surface  Not appropriate to test at this time       Modified tandem stand    Unable to attempt - patient fearful and hesitant to let go of cane, also holding on to PT 30" CGA       Limitation/Restriction for FOTO Balance Survey     Therapist reviewed FOTO scores for Jessica Floyd on 1/13/2022.   FOTO documents entered into [email or web address] - see Media section.     Limitation Score: 52%  Predicted Score: 58%          TREATMENT   Charges based on 1-1 tx:  Therapeutic exercises for 22 minutes:  Heel raises, B HRA, 3x10  Standing hip abd, R/L, 3x10 repetitions  Standing hip extension 3x10 repetitions  Bridges 3x10 repetitions, 3" hold up  Supine Hip adduction ball squeeze 30x 5"   SL R/L clam shell, OTB, 3x10 - supine today 2* hip pain  SLR 3x10 R/L  LTR red ball 3'    Neuromuscular reeducation for 30 minutes:  Gait belt donned for all balance exercises  Alt LE marches 2 laps x40 feet each   Step ups on 6" step with one HR CGA 3x10 reps   Step downs on 6" step " "with one HR CGA 3x10 reps  NBOS on airex foam EO/EC 3x30" ea  Marching on airex form 3x30" - fingertip support prn  Sharpened Rhomberg stance 2x30" ea alt LE   Side stepping with alternating shoulder taps 4x20" each CGA  Tandem stand and weight shift for bowling, R/L LE leading   Standing on airex mat, catching/tossing ball, 4x5      Therapeutic activities for 8 minutes:  Sit <> stand and one foam cushion 3x10, no weight in hand -> #2 weight in R hand -> #2 weight in L hand  Leg press machine: double limb -> single limb, from low point, 2 black cords     PATIENT EDUCATION AND HOME EXERCISES      Home Exercises Provided and Patient Education Provided      Education provided:   -balance systems      Written Home Exercises Provided: Patient instructed to cont prior HEP. Exercises were reviewed and Jessica was able to demonstrate them prior to the end of the session.  Jessica demonstrated good  understanding of the education provided. See EMR under Patient Instructions for exercises provided during therapy sessions     ASSESSMENT   Patient tolerated tx session fairly today. Pt requires max encouragement from PT to complete balance exercises 2/2 fear avoidance behavior. Pt would benefit from continued balance and L hip strengthening exercises to reduce her risk of falling.       Jessica is making fair progress towards meeting set goals.   Pt prognosis is Fair.      Pt will continue to benefit from skilled outpatient physical therapy to address the deficits listed in the problem list box on initial evaluation, provide pt/family education and to maximize pt's level of independence in the home and community environment.      Pt's spiritual, cultural and educational needs considered and pt agreeable to plan of care and goals.     Anticipated barriers to physical therapy: fear avoidance      Goals:  Short Term Goals: 4 weeks   (1) patient will be I with HOME EXERCISE PROGRAM (MET, 1/11/22)  (2) patient will complete 13 sit - " "stand transfers within 30" to facilitate improved transitional tolerance throughout ADLs (ongoing, not met, 1/20/2022)   (3) patient will complete 25 heel raises, no HRA, to facilitate improved static balance when reaching into upper cabinets (ongoing, not met, 1/20/2022)      Long Term Goals: 8 weeks   (1) patient will participate in modified tandem stand with L LE in front for 30" with CGA to minimize risk of fall (ongoing, not met, 1/20/2022)   (2) patient will participate in modified Rhomberg stand, EC, firm surface for 25" with CGA to minimize risk of fall (met, 1/20/2022)   (3) patient will average >13.5" TUG trials to facilitate decreased risk of fall during community mobility tasks (progressing, not met, 1/20/2022)     Plan   Focus on improving balance/proprioception exercises with emphasis on ADL performance.     Updated Certification Period: 2/2/2022 to 03/20/2022  Outpatient Physical Therapy 2 times weekly for 4 weeks to include the following interventions: Gait Training, Manual Therapy, Moist Heat/ Ice, Neuromuscular Re-ed, Patient Education, Self Care, Therapeutic Activities and Therapeutic Exercise.          Shazia Elkins, PT  2/2/2022                  "

## 2022-02-07 ENCOUNTER — CLINICAL SUPPORT (OUTPATIENT)
Dept: REHABILITATION | Facility: OTHER | Age: 72
End: 2022-02-07
Payer: MEDICARE

## 2022-02-07 DIAGNOSIS — R26.89 BALANCE PROBLEM: ICD-10-CM

## 2022-02-07 PROCEDURE — 97110 THERAPEUTIC EXERCISES: CPT | Mod: HCNC,PN,CQ

## 2022-02-07 PROCEDURE — 97112 NEUROMUSCULAR REEDUCATION: CPT | Mod: HCNC,PN,CQ

## 2022-02-07 NOTE — PROGRESS NOTES
"  Physical Therapy Daily Treatment Note     Visit Date: 2/7/2022  Name: Jessica Floyd  Clinic Number: [id number]    Therapy Diagnosis:   Encounter Diagnosis   Name Primary?    Balance problem      Physician: Niyah Sheffield PA-C    Physician Orders: PT Eval and Treat   Medical Diagnosis from Referral: R26.89 (ICD-10-CM) - Balance problem   Evaluation Date: 1/4/2022  Authorization Period Expiration: 4/1/22  Plan of Care Expiration: 03/04/2022  Visit # / Visits authorized: 11/20  FOTO: #2/3 (52% on 1/13/2022)     Precautions: Standard and Fall     Prior Level of Function: mild limitation, caring for 4 year old grand child, walking often without cane      Time in: 0800  Time out: 0905  Total time: 60 minutes      SUBJECTIVE   Pt reports she went to a parade and her balance was pretty good. States she is seeing some progress.     She reports compliance with  HOME EXERCISE PROGRAM.  Response to previous treatment: feels like she does better in PHYSICAL THERAPY after practicing the exercises at home   Function:  has noticed that she is negotiating stairs more confidently, continues with use of cane      Pain: 0/10  Location: Pt denies pain today     OBJECTIVE   1/20/2022:  Observation: Significant fear avoidance (of falling) during assessment and treatment today.  Positional and transitional tolerance impacted by dizziness/shortness of breath. L visual field begins around 30 degrees from midline.     Posture: forward flexed at hips with center of gravity forward past base of support.     Gait: walks with cane as "security blanket" on curbs      Range of Motion: AROM:  Hip Left  Right    Flexion 116 109      Knee Left  Right    Extension 0 0   Flexion 132 142      Ankle Left  Right    Dorsiflexion 7 6   Plantarflexion 51 40      Strength:  Hip Left  Right    Flexion 4/5 4/5   Abduction 4/5 in sitting  4/5 in sitting    Adduction 4/5 in sitting  4/5 in sitting   Extension 3-  Difficulty clearing mat  4- with hip " "substitution movements       Knee Left  Right   Extension 4/5 4-/5      Ankle Left  Right    Dorsiflexion 4/5 4/5   Plantarflexion 25 heel raises with HRA  25 heel raises with HRA      Special Tests:    Left Right   Slump  Negative  Negative       Function:   Sit - stand:10 X in 30"   pushes with hands on chair      TUG:  Trial 1: 16", 16", CGA, short step length   Trial 2: 14", 15", CGA, short step length  Trial 3: 14", 12"CGA, short step length  Average: 14.67", 14.33"      Sensation: light touch sensation intact and equal bilaterally      Static Balance    Left  Right    SLS   Unsteadiness with B HRA Unsteadiness with B HRA    Rhomberg stand, EO, firm surface  25", 30"CGA , feet not completely touching        Rhomberg stand, EC, firm surface 7", 25" CGA, feet not completely toucing        Rhomberg srand, EO, unsteady surface 7", 18", 26" CGA        Rhomberg stand, EC, unsteady surface  Not appropriate to test at this time       Modified tandem stand    Unable to attempt - patient fearful and hesitant to let go of cane, also holding on to PT 30" CGA       Limitation/Restriction for FOTO Balance Survey     Therapist reviewed FOTO scores for Jessica Floyd on 1/13/2022.   FOTO documents entered into [employer] - see Media section.     Limitation Score: 52%  Predicted Score: 58%          TREATMENT   Charges based on 1-1 tx:  Therapeutic exercises for 24 minutes:  Heel raises, B HRA, 3x10  Standing hip abd, R/L, 3x10 repetitions  Standing hip extension 3x10 repetitions  Bridges 3x10 repetitions, 3" hold up  Supine Hip adduction ball squeeze 30x 5"   SL R/L clam shell, OTB, 3x10 - supine today 2* hip pain  SLR 3x10 R/L  LTR red ball 3'    Neuromuscular reeducation for 30 minutes:    Gait belt donned for all balance exercises  Alt LE marches 2 laps x40 feet each   Step ups on 6" step with one HR CGA 3x10 reps   Step downs on 6" step with one HR CGA 3x10 reps  NBOS on airex foam EO/EC 3x30" ea  Marching on airex form 3x30" - " "fingertip support prn  Sharpened Rhomberg stance 2x30" ea alt LE    Side stepping with alternating shoulder taps 2x20' each CGA   Tandem stand and weight shift for bowling, R/L LE leading   Standing on airex mat, catching/tossing ball, 4x5       Therapeutic activities for 6 minutes:  Sit <> stand and one foam cushion 3x10, no weight in hand -> #2 weight in R hand -> #2 weight in L hand  Leg press machine: double limb -> single limb, from low point, 2 black cords     PATIENT EDUCATION AND HOME EXERCISES      Home Exercises Provided and Patient Education Provided      Education provided:   exercise form and rationale       Written Home Exercises Provided: Patient instructed to cont prior HEP. Exercises were reviewed and Jessica was able to demonstrate them prior to the end of the session.  Jessica demonstrated good  understanding of the education provided. See EMR under Patient Instructions for exercises provided during therapy sessions     ASSESSMENT   Pt demonstrated slightly improved endurance with functional step ups with decreased periods of rest between sets as well as increased eccentric control with sit to stand. Balance/proprioception deficits continue to remain notable at this time and are hindered by fear avoidance.            Jessica is making fair progress towards meeting set goals.   Pt prognosis is Fair.      Pt will continue to benefit from skilled outpatient physical therapy to address the deficits listed in the problem list box on initial evaluation, provide pt/family education and to maximize pt's level of independence in the home and community environment.      Pt's spiritual, cultural and educational needs considered and pt agreeable to plan of care and goals.     Anticipated barriers to physical therapy: fear avoidance      Goals:  Short Term Goals: 4 weeks   (1) patient will be I with HOME EXERCISE PROGRAM (MET, 1/11/22)  (2) patient will complete 13 sit - stand transfers within 30" to facilitate " "improved transitional tolerance throughout ADLs (ongoing, not met, 1/20/2022)   (3) patient will complete 25 heel raises, no HRA, to facilitate improved static balance when reaching into upper cabinets (ongoing, not met, 1/20/2022)      Long Term Goals: 8 weeks   (1) patient will participate in modified tandem stand with L LE in front for 30" with CGA to minimize risk of fall (ongoing, not met, 1/20/2022)   (2) patient will participate in modified Rhomberg stand, EC, firm surface for 25" with CGA to minimize risk of fall (met, 1/20/2022)   (3) patient will average >13.5" TUG trials to facilitate decreased risk of fall during community mobility tasks (progressing, not met, 1/20/2022)     Plan   Focus on improving balance/proprioception exercises with emphasis on ADL performance.     Updated Certification Period: 2/7/2022 to 03/20/2022  Outpatient Physical Therapy 2 times weekly for 4 weeks to include the following interventions: Gait Training, Manual Therapy, Moist Heat/ Ice, Neuromuscular Re-ed, Patient Education, Self Care, Therapeutic Activities and Therapeutic Exercise.          Harsha Benitez, PTA  2/7/2022                    "

## 2022-02-09 ENCOUNTER — CLINICAL SUPPORT (OUTPATIENT)
Dept: REHABILITATION | Facility: OTHER | Age: 72
End: 2022-02-09
Payer: MEDICARE

## 2022-02-09 DIAGNOSIS — R26.89 BALANCE PROBLEM: ICD-10-CM

## 2022-02-09 PROCEDURE — 97110 THERAPEUTIC EXERCISES: CPT | Mod: HCNC,PN,CQ

## 2022-02-09 PROCEDURE — 97112 NEUROMUSCULAR REEDUCATION: CPT | Mod: HCNC,PN,CQ

## 2022-02-09 NOTE — PROGRESS NOTES
"  Physical Therapy Daily Treatment Note     Visit Date: 2/9/2022  Name: Jessica Floyd  Clinic Number: [id number]    Therapy Diagnosis:   Encounter Diagnosis   Name Primary?    Balance problem      Physician: Niyah Sheffield PA-C    Physician Orders: PT Eval and Treat   Medical Diagnosis from Referral: R26.89 (ICD-10-CM) - Balance problem   Evaluation Date: 1/4/2022  Authorization Period Expiration: 4/1/22  Plan of Care Expiration: 03/04/2022  Visit # / Visits authorized: 12/20  FOTO: #2/3 (52% on 1/13/2022)     Precautions: Standard and Fall     Prior Level of Function: mild limitation, caring for 4 year old grand child, walking often without cane      Time in: 0815 - arrival time  Time out: 0908  Total time: 53 minutes      SUBJECTIVE   Pt reports she is a pretty sore in her glutes from performing over 150 sit to stand exercises yesterday. States her balance is improving since beginning  PT.      She reports compliance with  HOME EXERCISE PROGRAM.  Response to previous treatment: feels sore in her glutes.  Function:  has noticed that she is negotiating stairs more confidently, continues with use of cane      Pain: 0/10  Location: Pt denies pain today     OBJECTIVE   1/20/2022:  Observation: Significant fear avoidance (of falling) during assessment and treatment today.  Positional and transitional tolerance impacted by dizziness/shortness of breath. L visual field begins around 30 degrees from midline.     Posture: forward flexed at hips with center of gravity forward past base of support.     Gait: walks with cane as "security blanket" on curbs      Range of Motion: AROM:  Hip Left  Right    Flexion 116 109      Knee Left  Right    Extension 0 0   Flexion 132 142      Ankle Left  Right    Dorsiflexion 7 6   Plantarflexion 51 40      Strength:  Hip Left  Right    Flexion 4/5 4/5   Abduction 4/5 in sitting  4/5 in sitting    Adduction 4/5 in sitting  4/5 in sitting   Extension 3-  Difficulty clearing mat  4- " "with hip substitution movements       Knee Left  Right   Extension 4/5 4-/5      Ankle Left  Right    Dorsiflexion 4/5 4/5   Plantarflexion 25 heel raises with HRA  25 heel raises with HRA      Special Tests:    Left Right   Slump  Negative  Negative       Function:   Sit - stand:10 X in 30"   pushes with hands on chair      TUG:  Trial 1: 16", 16", CGA, short step length   Trial 2: 14", 15", CGA, short step length  Trial 3: 14", 12"CGA, short step length  Average: 14.67", 14.33"      Sensation: light touch sensation intact and equal bilaterally      Static Balance    Left  Right    SLS   Unsteadiness with B HRA Unsteadiness with B HRA    Rhomberg stand, EO, firm surface  25", 30"CGA , feet not completely touching        Rhomberg stand, EC, firm surface 7", 25" CGA, feet not completely toucing        Rhomberg srand, EO, unsteady surface 7", 18", 26" CGA        Rhomberg stand, EC, unsteady surface  Not appropriate to test at this time       Modified tandem stand    Unable to attempt - patient fearful and hesitant to let go of cane, also holding on to PT 30" CGA       Limitation/Restriction for FOTO Balance Survey     Therapist reviewed FOTO scores for Jessica Floyd on 1/13/2022.   FOTO documents entered into [employer] - see Media section.     Limitation Score: 52%  Predicted Score: 58%          TREATMENT   Charges based on 1-1 tx:  Therapeutic exercises for 23 minutes:  Heel raises, B HRA, 3x10  Standing hip abd, R/L, 3x10 repetitions  Standing hip extension 3x10 repetitions  Bridges 3x10 repetitions, 3" hold up  Supine Hip adduction ball squeeze 30x 5"   SL R/L clam shell, OTB, 3x10 - supine today 2* hip pain  SLR 3x10 R/L  LTR red ball 3'    Neuromuscular reeducation for 30 minutes:    Gait belt donned for all balance exercises  Alt LE marches 2 laps x40 feet each   Step ups on 6" step with one HR CGA 3x10 reps   Step downs on 6" step with one HR CGA 3x10 reps  NBOS on airex foam EO/EC 3x30" ea  Marching on airex form " "3x30" - fingertip support prn  Sharpened Rhomberg stance 2x30" ea alt LE    Side stepping with alternating shoulder taps 2x20' each CGA   Tandem stand and weight shift for bowling, R/L LE leading   Standing on airex mat, catching/tossing ball, 4x5       Therapeutic activities for 00 minutes:  Sit <> stand and one foam cushion 3x10, no weight in hand -> #2 weight in R hand -> #2 weight in L hand  Leg press machine: double limb -> single limb, from low point, 2 black cords     PATIENT EDUCATION AND HOME EXERCISES      Home Exercises Provided and Patient Education Provided      Education provided:   exercise form and rationale       Written Home Exercises Provided: Patient instructed to cont prior HEP. Exercises were reviewed and Jessica was able to demonstrate them prior to the end of the session.  Jessica demonstrated good  understanding of the education provided. See EMR under Patient Instructions for exercises provided during therapy sessions     ASSESSMENT   Pt education on the importance of limiting her HEP to stated repititions on her printout, as to avoid increased exercise induced muscle soreness/DOMS. Pt verbally expressed understanding.  Exercise was limited this visit secondary to time constraints/late arrival. Slightly improved static balance was noted today however endurance was a bit lower this visit and appeared related to soreness from HEP. Some improvement with fear avoidance however, still is prevalent with dynamic exercises involving reduced ASHLEY.              Jessica is making fair progress towards meeting set goals.   Pt prognosis is Fair.      Pt will continue to benefit from skilled outpatient physical therapy to address the deficits listed in the problem list box on initial evaluation, provide pt/family education and to maximize pt's level of independence in the home and community environment.      Pt's spiritual, cultural and educational needs considered and pt agreeable to plan of care and " "goals.     Anticipated barriers to physical therapy: fear avoidance      Goals:  Short Term Goals: 4 weeks   (1) patient will be I with HOME EXERCISE PROGRAM (MET, 1/11/22)  (2) patient will complete 13 sit - stand transfers within 30" to facilitate improved transitional tolerance throughout ADLs (ongoing, not met, 1/20/2022)   (3) patient will complete 25 heel raises, no HRA, to facilitate improved static balance when reaching into upper cabinets (ongoing, not met, 1/20/2022)      Long Term Goals: 8 weeks   (1) patient will participate in modified tandem stand with L LE in front for 30" with CGA to minimize risk of fall (ongoing, not met, 1/20/2022)   (2) patient will participate in modified Rhomberg stand, EC, firm surface for 25" with CGA to minimize risk of fall (met, 1/20/2022)   (3) patient will average >13.5" TUG trials to facilitate decreased risk of fall during community mobility tasks (progressing, not met, 1/20/2022)     Plan   Focus on improving balance/proprioception exercises with emphasis on ADL performance.     Updated Certification Period: 2/9/2022 to 03/20/2022  Outpatient Physical Therapy 2 times weekly for 4 weeks to include the following interventions: Gait Training, Manual Therapy, Moist Heat/ Ice, Neuromuscular Re-ed, Patient Education, Self Care, Therapeutic Activities and Therapeutic Exercise.        Harsha Benitez, PTA  2/9/2022                      "

## 2022-02-11 NOTE — PROGRESS NOTES
"  Physical Therapy Daily Treatment Note     Visit Date: 2/14/2022  Name: Jessica Floyd  Clinic Number: [id number]    Therapy Diagnosis:   Encounter Diagnosis   Name Primary?    Balance problem Yes     Physician: Niyah Sheffield PA-C    Physician Orders: PT Eval and Treat   Medical Diagnosis from Referral: R26.89 (ICD-10-CM) - Balance problem   Evaluation Date: 1/4/2022  Authorization Period Expiration: 4/1/22  Plan of Care Expiration: 03/04/2022  Visit # / Visits authorized: 13/20  FOTO: #2/3 (52% on 1/13/2022)     Precautions: Standard and Fall     Prior Level of Function: mild limitation, caring for 4 year old grand child, walking often without cane      Time in: 08:04am  Time out: 09:05am  Total time: 61 minutes      SUBJECTIVE     She reports compliance with  HOME EXERCISE PROGRAM.  Response to previous treatment: had to do a lot of walking between visits to visit grandchild in the hospital   Function:  has noticed that she is negotiating stairs more confidently, continues with use of cane      Pain: 0/10  Location: Pt denies pain today     OBJECTIVE   1/20/2022:  Observation: Significant fear avoidance (of falling) during assessment and treatment today.  Positional and transitional tolerance impacted by dizziness/shortness of breath. L visual field begins around 30 degrees from midline.     Posture: forward flexed at hips with center of gravity forward past base of support.     Gait: walks with cane as "security blanket" on curbs      Range of Motion: AROM:  Hip Left  Right    Flexion 116 109      Knee Left  Right    Extension 0 0   Flexion 132 142      Ankle Left  Right    Dorsiflexion 7 6   Plantarflexion 51 40      Strength:  Hip Left  Right    Flexion 4/5 4/5   Abduction 4/5 in sitting  4/5 in sitting    Adduction 4/5 in sitting  4/5 in sitting   Extension 3-  Difficulty clearing mat  4- with hip substitution movements       Knee Left  Right   Extension 4/5 4-/5      Ankle Left  Right  " "  Dorsiflexion 4/5 4/5   Plantarflexion 25 heel raises with HRA  25 heel raises with HRA      Special Tests:    Left Right   Slump  Negative  Negative       Function:   Sit - stand:10 X in 30"   pushes with hands on chair      TUG:  Trial 1: 16", 16", CGA, short step length   Trial 2: 14", 15", CGA, short step length  Trial 3: 14", 12"CGA, short step length  Average: 14.67", 14.33"      Sensation: light touch sensation intact and equal bilaterally      Static Balance    Left  Right    SLS   Unsteadiness with B HRA Unsteadiness with B HRA    Rhomberg stand, EO, firm surface  25", 30"CGA , feet not completely touching        Rhomberg stand, EC, firm surface 7", 25" CGA, feet not completely toucing        Rhomberg srand, EO, unsteady surface 7", 18", 26" CGA        Rhomberg stand, EC, unsteady surface  Not appropriate to test at this time       Modified tandem stand    Unable to attempt - patient fearful and hesitant to let go of cane, also holding on to PT 30" CGA       Limitation/Restriction for FOTO Balance Survey     Therapist reviewed FOTO scores for Jessica Floyd on 1/13/2022.   FOTO documents entered into [employer] - see Media section.     Limitation Score: 52%  Predicted Score: 58%          TREATMENT   Charges based on 1-1 tx:  Therapeutic exercises for 10 minutes:  Heel raises, B HRA, 3x10  Standing hip abd, R/L, 3x10 repetitions  Standing hip extension 3x10 repetitions  Bridges 3x10 repetitions, 3" hold up  Supine Hip adduction ball squeeze 30x 5"   SLR 3x10 R/L  Seated orange band pull apart overhead, airex mat in chair, 3x10    Neuromuscular reeducation for 39 minutes:  Gait belt donned for all balance exercises  Alt LE marches 2 laps x40 feet each   Weight shifting - ball/ring toss   Step ups on 6" step with one HR CGA 3x10 reps   Step downs on 6" step with one HR CGA 3x10 reps  NBOS on airex foam EC 3x30" ea  Marching on airex form 3x30" - fingertip support prn  Sharpened Rhomberg stance 2x30" ea alt LE  " "  Forward and side stepping over agility cones  Standing on airex mat, catching/tossing ball, 4x5       Therapeutic activities for 12 minutes:  Sit <> stand and one foam cushion 3x10  Leg press machine: double limb -> single limb, from low point, 2 black cords     PATIENT EDUCATION AND HOME EXERCISES      Home Exercises Provided and Patient Education Provided      Education provided:   exercise form and rationale       Written Home Exercises Provided: Patient instructed to cont prior HEP. Exercises were reviewed and Jessica was able to demonstrate them prior to the end of the session.  Jessica demonstrated good  understanding of the education provided. See EMR under Patient Instructions for exercises provided during therapy sessions     ASSESSMENT   Some improvement with fear avoidance however, still is prevalent with dynamic exercises involving reduced ASHLEY. Patient shows improved weight shifting and participation in dynamic balance challenges when distracted by conversation or games today.            Jessica is making fair progress towards meeting set goals.   Pt prognosis is Fair.      Pt will continue to benefit from skilled outpatient physical therapy to address the deficits listed in the problem list box on initial evaluation, provide pt/family education and to maximize pt's level of independence in the home and community environment.      Pt's spiritual, cultural and educational needs considered and pt agreeable to plan of care and goals.     Anticipated barriers to physical therapy: fear avoidance      Goals:  Short Term Goals: 4 weeks   (1) patient will be I with HOME EXERCISE PROGRAM (MET, 1/11/22)  (2) patient will complete 13 sit - stand transfers within 30" to facilitate improved transitional tolerance throughout ADLs (ongoing, not met, 1/20/2022)   (3) patient will complete 25 heel raises, no HRA, to facilitate improved static balance when reaching into upper cabinets (ongoing, not met, 1/20/2022)    " "  Long Term Goals: 8 weeks   (1) patient will participate in modified tandem stand with L LE in front for 30" with CGA to minimize risk of fall (ongoing, not met, 1/20/2022)   (2) patient will participate in modified Rhomberg stand, EC, firm surface for 25" with CGA to minimize risk of fall (met, 1/20/2022)   (3) patient will average >13.5" TUG trials to facilitate decreased risk of fall during community mobility tasks (progressing, not met, 1/20/2022)     Plan   Focus on improving balance/proprioception exercises with emphasis on ADL performance.     Updated Certification Period: 2/14/2022 to 03/20/2022  Outpatient Physical Therapy 2 times weekly for 4 weeks to include the following interventions: Gait Training, Manual Therapy, Moist Heat/ Ice, Neuromuscular Re-ed, Patient Education, Self Care, Therapeutic Activities and Therapeutic Exercise.        Mona Pierre, PT  2/14/2022                        "

## 2022-02-14 ENCOUNTER — CLINICAL SUPPORT (OUTPATIENT)
Dept: REHABILITATION | Facility: OTHER | Age: 72
End: 2022-02-14
Payer: MEDICARE

## 2022-02-14 DIAGNOSIS — R26.89 BALANCE PROBLEM: Primary | ICD-10-CM

## 2022-02-14 PROCEDURE — 97110 THERAPEUTIC EXERCISES: CPT | Mod: HCNC,PN

## 2022-02-14 PROCEDURE — 97112 NEUROMUSCULAR REEDUCATION: CPT | Mod: HCNC,PN

## 2022-02-14 PROCEDURE — 97530 THERAPEUTIC ACTIVITIES: CPT | Mod: HCNC,PN

## 2022-02-16 ENCOUNTER — CLINICAL SUPPORT (OUTPATIENT)
Dept: REHABILITATION | Facility: OTHER | Age: 72
End: 2022-02-16
Payer: MEDICARE

## 2022-02-16 DIAGNOSIS — R26.89 BALANCE PROBLEM: Primary | ICD-10-CM

## 2022-02-16 PROCEDURE — 97110 THERAPEUTIC EXERCISES: CPT | Mod: HCNC,PN

## 2022-02-16 PROCEDURE — 97530 THERAPEUTIC ACTIVITIES: CPT | Mod: HCNC,PN

## 2022-02-16 PROCEDURE — 97112 NEUROMUSCULAR REEDUCATION: CPT | Mod: HCNC,PN

## 2022-02-16 NOTE — PROGRESS NOTES
"  Physical Therapy Updated Plan of Care     Visit Date: 2/16/2022  Name: Jessica Floyd  Clinic Number: [id number]    Therapy Diagnosis:   Encounter Diagnosis   Name Primary?    Balance problem Yes     Physician: Niyah Sheffield PA-C    Physician Orders: PT Eval and Treat   Medical Diagnosis from Referral: R26.89 (ICD-10-CM) - Balance problem   Evaluation Date: 1/4/2022  Authorization Period Expiration: 4/1/22  Plan of Care Expiration: 5/16/22  Visit # / Visits authorized: 14/20  FOTO: #3/3 (52% on 1/13/2022)     Precautions: Standard and Fall     Prior Level of Function: mild limitation, caring for 4 year old grand child, walking often without cane      Time in: 08:04am  Time out: 09:05am  Total time: 61 minutes      SUBJECTIVE   Pt reports that she is doing well today. Pt feels that her fear of balance is very mental. However, she states that she is working hard on her exercises at home. Pt feels her balance has improved overall.       She reports compliance with  HOME EXERCISE PROGRAM.  Response to previous treatment: had to do a lot of walking between visits to visit grandchild in the hospital   Function:  has noticed that she is negotiating stairs more confidently, continues with use of cane      Pain: 0/10  Location: Pt denies pain today     OBJECTIVE   2/16/22:  Observation: Significant fear avoidance (of falling) during assessment and treatment today.  Positional and transitional tolerance impacted by dizziness/shortness of breath. L visual field begins around 30 degrees from midline.     Posture: forward flexed at hips with center of gravity forward past base of support.     Gait: walks with cane as "security blanket" on curbs      Range of Motion: AROM:  Hip Left  Right    Flexion 116 109      Knee Left  Right    Extension 0 0   Flexion 132 142      Ankle Left  Right    Dorsiflexion 7 6   Plantarflexion 51 40      Strength:  Hip Left  Right    Flexion 4/5 4/5   Abduction 4/5 in sitting  4/5 in " "sitting    Adduction 4/5 in sitting  4/5 in sitting   Extension 3-  Difficulty clearing mat  4- with hip substitution movements       Knee Left  Right   Extension 4/5 4-/5      Ankle Left  Right    Dorsiflexion 4/5 4/5   Plantarflexion 25 heel raises with HRA  25 heel raises with HRA      Special Tests:    Left Right   Slump  Negative  Negative       Function:   Sit - stand:12 X in 30"   pushes with hands on chair      TUG:  Trial 1: 14" SBA  Trial 2: 14" SBA  Trial 3: 14" SBA  Average: 14"     Sensation: light touch sensation intact and equal bilaterally      Static Balance    Left  Right    SLS   Unsteadiness with Unilateral  HRA Unsteadiness with Unilateral HRA    Rhomberg stand, EO, firm surface  25", 30"CGA    Pt unable to let go of // bars completely       Rhomberg stand, EC, firm surface 7", 25" CGA, feet not completely toucing        Rhomberg srand, EO, unsteady surface 7", 18", 26" CGA        Rhomberg stand, EC, unsteady surface  Not appropriate to test at this time       Modified tandem stand    Unable to attempt - patient fearful and hesitant to let go of cane, also holding on to PT 30" CGA       Limitation/Restriction for FOTO Balance Survey     Therapist reviewed FOTO scores for Jessica Floyd on 1/13/2022.   FOTO documents entered into [employer] - see Media section.     Limitation Score: 52%  Predicted Score: 58%          TREATMENT   Charges based on 1-1 tx:  Therapeutic exercises for 15 minutes:  +Updated Plan of Care  Heel raises, B HRA, 3x10  Standing hip abd, R/L, 3x10 repetitions  Standing hip extension 3x10 repetitions  Bridges 3x10 repetitions, 3" hold up  Supine Hip adduction ball squeeze 30x 5"   SLR 3x10 R/L  Seated orange band pull apart overhead, airex mat in chair, 3x10    Neuromuscular reeducation for 39 minutes:  Gait belt donned for all balance exercises  Alt KYE nielsenes 2 laps x40 feet each   Weight shifting - ball/ring toss   Step ups on 6" step with one HR CGA 3x10 reps   Step downs on 6" " "step with one HR CGA 3x10 reps  NBOS on airex foam EC 3x30" ea  Marching on airex form 3x60" - fingertip support prn  Sharpened Rhomberg stance 2x30" ea alt LE    Forward and side stepping over agility cones  Standing on airex mat, catching/tossing ball, 4x5       Therapeutic activities for 8 minutes:  Sit <> stand and one foam cushion 3x10  Leg press machine: double limb -> single limb, from low point, 2 black cords     PATIENT EDUCATION AND HOME EXERCISES      Home Exercises Provided and Patient Education Provided      Education provided:   exercise form and rationale       Written Home Exercises Provided: Patient instructed to cont prior HEP. Exercises were reviewed and Jessica was able to demonstrate them prior to the end of the session.  Jessica demonstrated good  understanding of the education provided. See EMR under Patient Instructions for exercises provided during therapy sessions     ASSESSMENT   Pt presents to physical therapy treatment with decreased decreased strength, poor posture, and balance impairments that place her at a fall risk. These factors are negatively impacting the patient's ability to complete their activities of daily living and work duties. Pt is progressing towards their short and long term goals as evidenced by improved sit to stand score, improved TUG, and improved FOTO score. These goals remain appropriate for the plan of care. Pt would benefit from continued skilled physical therapy treatment to address these deficits so that they may return to their prior level of function with improved quality of life and decreased risk of falling.              Jessica is making fair progress towards meeting set goals.   Pt prognosis is Fair.      Pt will continue to benefit from skilled outpatient physical therapy to address the deficits listed in the problem list box on initial evaluation, provide pt/family education and to maximize pt's level of independence in the home and community environment. " "     Pt's spiritual, cultural and educational needs considered and pt agreeable to plan of care and goals.     Anticipated barriers to physical therapy: fear avoidance      Goals:  Short Term Goals: 4 weeks   (1) patient will be I with HOME EXERCISE PROGRAM (MET, 1/11/22)  (2) patient will complete 13 sit - stand transfers within 30" to facilitate improved transitional tolerance throughout ADLs (MET, 2/16/22)  (3) patient will complete 25 heel raises, no HRA, to facilitate improved static balance when reaching into upper cabinets (ongoing, not met, 1/20/2022)      Long Term Goals: 8 weeks   (1) patient will participate in modified tandem stand with L LE in front for 30" with CGA to minimize risk of fall (MET, 2/16/22)  (2) patient will participate in modified Rhomberg stand, EC, firm surface for 25" with CGA to minimize risk of fall (met, 1/20/2022)   (3) patient will average >13.5" TUG trials to facilitate decreased risk of fall during community mobility tasks (progressing, not met, 1/20/2022)     Plan   Focus on improving balance/proprioception exercises with emphasis on ADL performance.     Updated Certification Period: 2/16/2022 to 5/16/22  Outpatient Physical Therapy 2 times weekly for 4 weeks to include the following interventions: Gait Training, Manual Therapy, Moist Heat/ Ice, Neuromuscular Re-ed, Patient Education, Self Care, Therapeutic Activities and Therapeutic Exercise.        Shazia Elkins, PT  2/16/2022                          "

## 2022-02-16 NOTE — PLAN OF CARE
"  Physical Therapy Updated Plan of Care     Visit Date: 2/16/2022  Name: Jessica Floyd  Clinic Number: [id number]    Therapy Diagnosis:   Encounter Diagnosis   Name Primary?    Balance problem Yes     Physician: Niyah Sheffield PA-C    Physician Orders: PT Eval and Treat   Medical Diagnosis from Referral: R26.89 (ICD-10-CM) - Balance problem   Evaluation Date: 1/4/2022  Authorization Period Expiration: 4/1/22  Plan of Care Expiration: 5/16/22  Visit # / Visits authorized: 14/20  FOTO: #3/3 (52% on 1/13/2022)     Precautions: Standard and Fall     Prior Level of Function: mild limitation, caring for 4 year old grand child, walking often without cane      Time in: 08:04am  Time out: 09:05am  Total time: 61 minutes      SUBJECTIVE   Pt reports that she is doing well today. Pt feels that her fear of balance is very mental. However, she states that she is working hard on her exercises at home. Pt feels her balance has improved overall.       She reports compliance with  HOME EXERCISE PROGRAM.  Response to previous treatment: had to do a lot of walking between visits to visit grandchild in the hospital   Function:  has noticed that she is negotiating stairs more confidently, continues with use of cane      Pain: 0/10  Location: Pt denies pain today     OBJECTIVE   2/16/22:  Observation: Significant fear avoidance (of falling) during assessment and treatment today.  Positional and transitional tolerance impacted by dizziness/shortness of breath. L visual field begins around 30 degrees from midline.     Posture: forward flexed at hips with center of gravity forward past base of support.     Gait: walks with cane as "security blanket" on curbs      Range of Motion: AROM:  Hip Left  Right    Flexion 116 109      Knee Left  Right    Extension 0 0   Flexion 132 142      Ankle Left  Right    Dorsiflexion 7 6   Plantarflexion 51 40      Strength:  Hip Left  Right    Flexion 4/5 4/5   Abduction 4/5 in sitting  4/5 in " "sitting    Adduction 4/5 in sitting  4/5 in sitting   Extension 3-  Difficulty clearing mat  4- with hip substitution movements       Knee Left  Right   Extension 4/5 4-/5      Ankle Left  Right    Dorsiflexion 4/5 4/5   Plantarflexion 25 heel raises with HRA  25 heel raises with HRA      Special Tests:    Left Right   Slump  Negative  Negative       Function:   Sit - stand:12 X in 30"   pushes with hands on chair      TUG:  Trial 1: 14" SBA  Trial 2: 14" SBA  Trial 3: 14" SBA  Average: 14"     Sensation: light touch sensation intact and equal bilaterally      Static Balance    Left  Right    SLS   Unsteadiness with Unilateral  HRA Unsteadiness with Unilateral HRA    Rhomberg stand, EO, firm surface  25", 30"CGA    Pt unable to let go of // bars completely       Rhomberg stand, EC, firm surface 7", 25" CGA, feet not completely toucing        Rhomberg srand, EO, unsteady surface 7", 18", 26" CGA        Rhomberg stand, EC, unsteady surface  Not appropriate to test at this time       Modified tandem stand    Unable to attempt - patient fearful and hesitant to let go of cane, also holding on to PT 30" CGA       Limitation/Restriction for FOTO Balance Survey     Therapist reviewed FOTO scores for Jessica Floyd on 1/13/2022.   FOTO documents entered into [employer] - see Media section.     Limitation Score: 52%  Predicted Score: 58%          TREATMENT   Charges based on 1-1 tx:  Therapeutic exercises for 15 minutes:  +Updated Plan of Care  Heel raises, B HRA, 3x10  Standing hip abd, R/L, 3x10 repetitions  Standing hip extension 3x10 repetitions  Bridges 3x10 repetitions, 3" hold up  Supine Hip adduction ball squeeze 30x 5"   SLR 3x10 R/L  Seated orange band pull apart overhead, airex mat in chair, 3x10    Neuromuscular reeducation for 39 minutes:  Gait belt donned for all balance exercises  Alt KYE nielsenes 2 laps x40 feet each   Weight shifting - ball/ring toss   Step ups on 6" step with one HR CGA 3x10 reps   Step downs on 6" " "step with one HR CGA 3x10 reps  NBOS on airex foam EC 3x30" ea  Marching on airex form 3x60" - fingertip support prn  Sharpened Rhomberg stance 2x30" ea alt LE    Forward and side stepping over agility cones  Standing on airex mat, catching/tossing ball, 4x5       Therapeutic activities for 8 minutes:  Sit <> stand and one foam cushion 3x10  Leg press machine: double limb -> single limb, from low point, 2 black cords     PATIENT EDUCATION AND HOME EXERCISES      Home Exercises Provided and Patient Education Provided      Education provided:   exercise form and rationale       Written Home Exercises Provided: Patient instructed to cont prior HEP. Exercises were reviewed and Jessica was able to demonstrate them prior to the end of the session.  Jessica demonstrated good  understanding of the education provided. See EMR under Patient Instructions for exercises provided during therapy sessions     ASSESSMENT   Pt presents to physical therapy treatment with decreased decreased strength, poor posture, and balance impairments that place her at a fall risk. These factors are negatively impacting the patient's ability to complete their activities of daily living and work duties. Pt is progressing towards their short and long term goals as evidenced by improved sit to stand score, improved TUG, and improved FOTO score. These goals remain appropriate for the plan of care. Pt would benefit from continued skilled physical therapy treatment to address these deficits so that they may return to their prior level of function with improved quality of life and decreased risk of falling.              Jessica is making fair progress towards meeting set goals.   Pt prognosis is Fair.      Pt will continue to benefit from skilled outpatient physical therapy to address the deficits listed in the problem list box on initial evaluation, provide pt/family education and to maximize pt's level of independence in the home and community environment. " "     Pt's spiritual, cultural and educational needs considered and pt agreeable to plan of care and goals.     Anticipated barriers to physical therapy: fear avoidance      Goals:  Short Term Goals: 4 weeks   (1) patient will be I with HOME EXERCISE PROGRAM (MET, 1/11/22)  (2) patient will complete 13 sit - stand transfers within 30" to facilitate improved transitional tolerance throughout ADLs (MET, 2/16/22)  (3) patient will complete 25 heel raises, no HRA, to facilitate improved static balance when reaching into upper cabinets (ongoing, not met, 1/20/2022)      Long Term Goals: 8 weeks   (1) patient will participate in modified tandem stand with L LE in front for 30" with CGA to minimize risk of fall (MET, 2/16/22)  (2) patient will participate in modified Rhomberg stand, EC, firm surface for 25" with CGA to minimize risk of fall (met, 1/20/2022)   (3) patient will average >13.5" TUG trials to facilitate decreased risk of fall during community mobility tasks (progressing, not met, 1/20/2022)     Plan   Focus on improving balance/proprioception exercises with emphasis on ADL performance.     Updated Certification Period: 2/16/2022 to 4/16/22  Outpatient Physical Therapy 2 times weekly for 4 weeks to include the following interventions: Gait Training, Manual Therapy, Moist Heat/ Ice, Neuromuscular Re-ed, Patient Education, Self Care, Therapeutic Activities and Therapeutic Exercise.        Shazia Elkins, PT  2/16/2022                "

## 2022-02-18 NOTE — PROGRESS NOTES
"[facility]   Physical Therapy Treatment Note     Name: Jessica Floyd  Clinic Number: [id number]    Therapy Diagnosis:   Encounter Diagnosis   Name Primary?    Balance problem Yes     Physician: Niyah Sheffield PA-C    Physician Orders: PT Eval and Treat   Medical Diagnosis from Referral: R26.89 (ICD-10-CM) - Balance problem   Evaluation Date: 1/4/2022  Authorization Period Expiration: 4/1/22  Plan of Care Expiration: 5/16/22  Visit # / Visits authorized: 14/20  FOTO: #3/3 (52% on 1/13/2022)     Precautions: Standard and Fall     Prior Level of Function: mild limitation, caring for 4 year old grand child, walking often without cane      Time in: 08:09am (late arrival)   Time out: 09:01am  Total time: 52 minutes      SUBJECTIVE   She reports compliance with HOME EXERCISE PROGRAM.  Response to previous treatment: did a lot of walking over the weekend and is feeling a little soreness in her leg muscles   Function:  has noticed that she is negotiating stairs more confidently, continues with use of cane and still feels impacted by fear avoidance      Pain: 0/10  Location: Pt denies pain today     OBJECTIVE   2/16/22:  Observation: Significant fear avoidance (of falling) during assessment and treatment today.  Positional and transitional tolerance impacted by dizziness/shortness of breath. L visual field begins around 30 degrees from midline.     Posture: forward flexed at hips with center of gravity forward past base of support.     Gait: walks with cane as "security blanket" on curbs      Range of Motion: AROM:  Hip Left  Right    Flexion 116 109      Knee Left  Right    Extension 0 0   Flexion 132 142      Ankle Left  Right    Dorsiflexion 7 6   Plantarflexion 51 40      Strength:  Hip Left  Right    Flexion 4/5 4/5   Abduction 4/5 in sitting  4/5 in sitting    Adduction 4/5 in sitting  4/5 in sitting   Extension 3-  Difficulty clearing mat  4- with hip substitution movements     " "  Knee Left  Right   Extension 4/5 4-/5      Ankle Left  Right    Dorsiflexion 4/5 4/5   Plantarflexion 25 heel raises with HRA  25 heel raises with HRA      Special Tests:    Left Right   Slump  Negative  Negative       Function:   Sit - stand:12 X in 30"   pushes with hands on chair      TUG:  Trial 1: 14" SBA  Trial 2: 14" SBA  Trial 3: 14" SBA  Average: 14"     Sensation: light touch sensation intact and equal bilaterally      Static Balance    Left  Right    SLS   Unsteadiness with Unilateral  HRA Unsteadiness with Unilateral HRA    Rhomberg stand, EO, firm surface  25", 30"CGA    Pt unable to let go of // bars completely       Rhomberg stand, EC, firm surface 7", 25" CGA, feet not completely toucing        Rhomberg srand, EO, unsteady surface 7", 18", 26" CGA        Rhomberg stand, EC, unsteady surface  Not appropriate to test at this time       Modified tandem stand    Unable to attempt - patient fearful and hesitant to let go of cane, also holding on to PT 30" CGA       Limitation/Restriction for FOTO Balance Survey     Therapist reviewed FOTO scores for Jessica Floyd on 1/13/2022.   FOTO documents entered into [employer] - see Media section.     Limitation Score: 52%  Predicted Score: 58%          TREATMENT   Charges based on 1-1 tx:  Therapeutic exercises for 15 minutes:  Heel raises, B HRA, 3x10  Standing hip abd, R/L, 3x10 repetitions  Standing hip extension 3x10 repetitions  Seated hip IR with anne band at ankles, R/L 3x10   Bridges 3x10 repetitions, 3" hold up  Supine Hip adduction ball squeeze 30x 5" \  Clam shell in R/L SL with anne band, 3x10  SLR 3x10 R/L  Seated orange band pull apart overhead, airex mat in chair, 3x10    Neuromuscular reeducation for 37 minutes:  Gait belt donned for all balance exercises  Alt LE marches 2 laps x40 feet each   Weight shifting - ball/ring toss, cone stepping various directions with response to cues   Step ups on 6" step with one HR CGA 3x10 reps   Step downs on 6" step " "with one HR CGA 3x10 reps  NBOS on airex foam EC 3x30" ea  Marching on airex form 3x60" - fingertip support prn  Sharpened Rhomberg stance 2x30" ea alt LE    Forward and side stepping over agility cones  Standing on airex mat, catching/tossing ball, 4x5       Therapeutic activities for 00 minutes:  Sit <> stand and one foam cushion 3x10  Leg press machine: double limb -> single limb, from low point, 2 black cords     PATIENT EDUCATION AND HOME EXERCISES      Home Exercises Provided and Patient Education Provided      Education provided:   exercise form and rationale       Written Home Exercises Provided: Patient instructed to cont prior HEP. Exercises were reviewed and Jessica was able to demonstrate them prior to the end of the session.  Jessica demonstrated good  understanding of the education provided. See EMR under Patient Instructions for exercises provided during therapy sessions     ASSESSMENT   Minimal weight shifting but good response to changing cues during cone training tasks. L knee painful during standing tasks today, so second portion of visit completed doing strength training on mat.            Jessica is making fair progress towards meeting set goals.   Pt prognosis is Fair.      Pt will continue to benefit from skilled outpatient physical therapy to address the deficits listed in the problem list box on initial evaluation, provide pt/family education and to maximize pt's level of independence in the home and community environment.      Pt's spiritual, cultural and educational needs considered and pt agreeable to plan of care and goals.     Anticipated barriers to physical therapy: fear avoidance      Goals:  Short Term Goals: 4 weeks   (1) patient will be I with HOME EXERCISE PROGRAM (MET, 1/11/22)  (2) patient will complete 13 sit - stand transfers within 30" to facilitate improved transitional tolerance throughout ADLs (MET, 2/16/22)  (3) patient will complete 25 heel raises, no HRA, to facilitate " "improved static balance when reaching into upper cabinets (ongoing, not met, 1/20/2022)      Long Term Goals: 8 weeks   (1) patient will participate in modified tandem stand with L LE in front for 30" with CGA to minimize risk of fall (MET, 2/16/22)  (2) patient will participate in modified Rhomberg stand, EC, firm surface for 25" with CGA to minimize risk of fall (met, 1/20/2022)   (3) patient will average >13.5" TUG trials to facilitate decreased risk of fall during community mobility tasks (progressing, not met, 1/20/2022)     Plan   Focus on improving balance/proprioception exercises with emphasis on ADL performance.     Updated Certification Period: 2/21/2022 to 5/16/22  Outpatient Physical Therapy 2 times weekly for 4 weeks to include the following interventions: Gait Training, Manual Therapy, Moist Heat/ Ice, Neuromuscular Re-ed, Patient Education, Self Care, Therapeutic Activities and Therapeutic Exercise.        Mona Pierre, PT  2/21/2022                            "

## 2022-02-21 ENCOUNTER — CLINICAL SUPPORT (OUTPATIENT)
Dept: REHABILITATION | Facility: OTHER | Age: 72
End: 2022-02-21
Payer: MEDICARE

## 2022-02-21 DIAGNOSIS — R26.89 BALANCE PROBLEM: Primary | ICD-10-CM

## 2022-02-21 PROCEDURE — 97112 NEUROMUSCULAR REEDUCATION: CPT | Mod: HCNC,PN

## 2022-02-21 PROCEDURE — 97110 THERAPEUTIC EXERCISES: CPT | Mod: HCNC,PN

## 2022-02-23 ENCOUNTER — CLINICAL SUPPORT (OUTPATIENT)
Dept: REHABILITATION | Facility: OTHER | Age: 72
End: 2022-02-23
Payer: MEDICARE

## 2022-02-23 DIAGNOSIS — R26.89 BALANCE PROBLEM: Primary | ICD-10-CM

## 2022-02-23 PROCEDURE — 97110 THERAPEUTIC EXERCISES: CPT | Mod: HCNC,PN

## 2022-02-23 PROCEDURE — 97112 NEUROMUSCULAR REEDUCATION: CPT | Mod: HCNC,PN

## 2022-02-23 NOTE — PROGRESS NOTES
"[facility]   Physical Therapy Treatment Note     Name: Jessica Floyd  Clinic Number: [id number]    Therapy Diagnosis:   Encounter Diagnosis   Name Primary?    Balance problem Yes     Physician: Niyah Sheffield PA-C    Physician Orders: PT Eval and Treat   Medical Diagnosis from Referral: R26.89 (ICD-10-CM) - Balance problem   Evaluation Date: 1/4/2022  Authorization Period Expiration: 4/1/22  Plan of Care Expiration: 5/16/22  Visit # / Visits authorized: 16/20  FOTO: #3/3 (52% on 1/13/2022)     Precautions: Standard and Fall     Prior Level of Function: mild limitation, caring for 4 year old grand child, walking often without cane      Time in: 08:00am   Time out: 09:00am  Total time: 60 minutes      SUBJECTIVE   She reports compliance with HOME EXERCISE PROGRAM.  Response to previous treatment: did a lot of walking over the weekend and is feeling a little soreness in her leg muscles   Function:  has noticed that she is negotiating stairs more confidently, continues with use of cane and still feels impacted by fear avoidance      Pain: 0/10  Location: Pt denies pain today     OBJECTIVE   2/16/22:  Observation: Significant fear avoidance (of falling) during assessment and treatment today.  Positional and transitional tolerance impacted by dizziness/shortness of breath. L visual field begins around 30 degrees from midline.     Posture: forward flexed at hips with center of gravity forward past base of support.     Gait: walks with cane as "security blanket" on curbs      Range of Motion: AROM:  Hip Left  Right    Flexion 116 109      Knee Left  Right    Extension 0 0   Flexion 132 142      Ankle Left  Right    Dorsiflexion 7 6   Plantarflexion 51 40      Strength:  Hip Left  Right    Flexion 4/5 4/5   Abduction 4/5 in sitting  4/5 in sitting    Adduction 4/5 in sitting  4/5 in sitting   Extension 3-  Difficulty clearing mat  4- with hip substitution movements       Knee Left  Right " "  Extension 4/5 4-/5      Ankle Left  Right    Dorsiflexion 4/5 4/5   Plantarflexion 25 heel raises with HRA  25 heel raises with HRA      Special Tests:    Left Right   Slump  Negative  Negative       Function:   Sit - stand:12 X in 30"   pushes with hands on chair      TUG:  Trial 1: 14" SBA  Trial 2: 14" SBA  Trial 3: 14" SBA  Average: 14"     Sensation: light touch sensation intact and equal bilaterally      Static Balance    Left  Right    SLS   Unsteadiness with Unilateral  HRA Unsteadiness with Unilateral HRA    Rhomberg stand, EO, firm surface  25", 30"CGA    Pt unable to let go of // bars completely       Rhomberg stand, EC, firm surface 7", 25" CGA, feet not completely toucing        Rhomberg srand, EO, unsteady surface 7", 18", 26" CGA        Rhomberg stand, EC, unsteady surface  Not appropriate to test at this time       Modified tandem stand    Unable to attempt - patient fearful and hesitant to let go of cane, also holding on to PT 30" CGA       Limitation/Restriction for FOTO Balance Survey     Therapist reviewed FOTO scores for Jessica Floyd on 1/13/2022.   FOTO documents entered into [employer] - see Media section.     Limitation Score: 52%  Predicted Score: 58%          TREATMENT   Charges based on 1-1 tx:  Therapeutic exercises for 15 minutes:  Heel raises, B HRA, 3x10  Standing hip abd, R/L, 3x10 repetitions  Standing hip extension 3x10 repetitions  Seated hip IR with anne band at ankles, R/L 3x10   Bridges 3x10 repetitions, 3" hold up  Supine Hip adduction ball squeeze 30x 5" \  Clam shell in R/L SL with anne band, 3x10  SLR 3x10 R/L  Seated orange band pull apart overhead, airex mat in chair, 3x10    Neuromuscular reeducation for 45 minutes:  Gait belt donned for all balance exercises  Alt KYE nielsenes 2 laps x40 feet each   Weight shifting - ball/ring toss, cone stepping various directions with response to cues   Step ups on 6" step with one HR CGA 3x10 reps   Step downs on 6" step with one HR CGA " "3x10 reps  NBOS on airex foam EC 3x30" ea  Marching on airex form 3x60" - fingertip support prn  Sharpened Rhomberg stance 2x30" ea alt LE    Forward and side stepping over agility cones  Standing on airex mat, catching/tossing ball, 4x5       Therapeutic activities for 00 minutes:  Sit <> stand and one foam cushion 3x10  Leg press machine: double limb -> single limb, from low point, 2 black cords     PATIENT EDUCATION AND HOME EXERCISES      Home Exercises Provided and Patient Education Provided      Education provided:   exercise form and rationale       Written Home Exercises Provided: Patient instructed to cont prior HEP. Exercises were reviewed and Jessica was able to demonstrate them prior to the end of the session.  Jessica demonstrated good  understanding of the education provided. See EMR under Patient Instructions for exercises provided during therapy sessions     ASSESSMENT   Minimal weight shifting but good response to changing cues during cone training tasks. L knee painful during standing tasks today, so second portion of visit completed doing strength training on mat.            Jessica is making fair progress towards meeting set goals.   Pt prognosis is Fair.      Pt will continue to benefit from skilled outpatient physical therapy to address the deficits listed in the problem list box on initial evaluation, provide pt/family education and to maximize pt's level of independence in the home and community environment.      Pt's spiritual, cultural and educational needs considered and pt agreeable to plan of care and goals.     Anticipated barriers to physical therapy: fear avoidance      Goals:  Short Term Goals: 4 weeks   (1) patient will be I with HOME EXERCISE PROGRAM (MET, 1/11/22)  (2) patient will complete 13 sit - stand transfers within 30" to facilitate improved transitional tolerance throughout ADLs (MET, 2/16/22)  (3) patient will complete 25 heel raises, no HRA, to facilitate improved static " "balance when reaching into upper cabinets (ongoing, not met, 1/20/2022)      Long Term Goals: 8 weeks   (1) patient will participate in modified tandem stand with L LE in front for 30" with CGA to minimize risk of fall (MET, 2/16/22)  (2) patient will participate in modified Rhomberg stand, EC, firm surface for 25" with CGA to minimize risk of fall (met, 1/20/2022)   (3) patient will average >13.5" TUG trials to facilitate decreased risk of fall during community mobility tasks (progressing, not met, 1/20/2022)     Plan   Focus on improving balance/proprioception exercises with emphasis on ADL performance.     Updated Certification Period: 2/23/2022 to 5/16/22  Outpatient Physical Therapy 2 times weekly for 4 weeks to include the following interventions: Gait Training, Manual Therapy, Moist Heat/ Ice, Neuromuscular Re-ed, Patient Education, Self Care, Therapeutic Activities and Therapeutic Exercise.        Shazia Elkins, PT  2/23/2022                              "

## 2022-02-25 ENCOUNTER — OFFICE VISIT (OUTPATIENT)
Dept: INTERNAL MEDICINE | Facility: CLINIC | Age: 72
End: 2022-02-25
Payer: MEDICARE

## 2022-02-25 VITALS
HEART RATE: 76 BPM | SYSTOLIC BLOOD PRESSURE: 124 MMHG | DIASTOLIC BLOOD PRESSURE: 76 MMHG | BODY MASS INDEX: 24.81 KG/M2 | HEIGHT: 64 IN | WEIGHT: 145.31 LBS | OXYGEN SATURATION: 95 %

## 2022-02-25 DIAGNOSIS — K21.9 GASTROESOPHAGEAL REFLUX DISEASE WITHOUT ESOPHAGITIS: ICD-10-CM

## 2022-02-25 DIAGNOSIS — J01.90 ACUTE RHINOSINUSITIS: ICD-10-CM

## 2022-02-25 DIAGNOSIS — R26.89 BALANCE PROBLEM: ICD-10-CM

## 2022-02-25 DIAGNOSIS — M25.531 RIGHT WRIST PAIN: Primary | ICD-10-CM

## 2022-02-25 PROCEDURE — 1159F PR MEDICATION LIST DOCUMENTED IN MEDICAL RECORD: ICD-10-PCS | Mod: HCNC,CPTII,S$GLB, | Performed by: PHYSICIAN ASSISTANT

## 2022-02-25 PROCEDURE — 3074F SYST BP LT 130 MM HG: CPT | Mod: HCNC,CPTII,S$GLB, | Performed by: PHYSICIAN ASSISTANT

## 2022-02-25 PROCEDURE — 1126F PR PAIN SEVERITY QUANTIFIED, NO PAIN PRESENT: ICD-10-PCS | Mod: HCNC,CPTII,S$GLB, | Performed by: PHYSICIAN ASSISTANT

## 2022-02-25 PROCEDURE — 99214 OFFICE O/P EST MOD 30 MIN: CPT | Mod: HCNC,S$GLB,, | Performed by: PHYSICIAN ASSISTANT

## 2022-02-25 PROCEDURE — 1159F MED LIST DOCD IN RCRD: CPT | Mod: HCNC,CPTII,S$GLB, | Performed by: PHYSICIAN ASSISTANT

## 2022-02-25 PROCEDURE — 3008F BODY MASS INDEX DOCD: CPT | Mod: HCNC,CPTII,S$GLB, | Performed by: PHYSICIAN ASSISTANT

## 2022-02-25 PROCEDURE — 99999 PR PBB SHADOW E&M-EST. PATIENT-LVL IV: ICD-10-PCS | Mod: PBBFAC,HCNC,, | Performed by: PHYSICIAN ASSISTANT

## 2022-02-25 PROCEDURE — 3078F DIAST BP <80 MM HG: CPT | Mod: HCNC,CPTII,S$GLB, | Performed by: PHYSICIAN ASSISTANT

## 2022-02-25 PROCEDURE — 99999 PR PBB SHADOW E&M-EST. PATIENT-LVL IV: CPT | Mod: PBBFAC,HCNC,, | Performed by: PHYSICIAN ASSISTANT

## 2022-02-25 PROCEDURE — 99214 PR OFFICE/OUTPT VISIT, EST, LEVL IV, 30-39 MIN: ICD-10-PCS | Mod: HCNC,S$GLB,, | Performed by: PHYSICIAN ASSISTANT

## 2022-02-25 PROCEDURE — 3074F PR MOST RECENT SYSTOLIC BLOOD PRESSURE < 130 MM HG: ICD-10-PCS | Mod: HCNC,CPTII,S$GLB, | Performed by: PHYSICIAN ASSISTANT

## 2022-02-25 PROCEDURE — 3288F FALL RISK ASSESSMENT DOCD: CPT | Mod: HCNC,CPTII,S$GLB, | Performed by: PHYSICIAN ASSISTANT

## 2022-02-25 PROCEDURE — 1160F PR REVIEW ALL MEDS BY PRESCRIBER/CLIN PHARMACIST DOCUMENTED: ICD-10-PCS | Mod: HCNC,CPTII,S$GLB, | Performed by: PHYSICIAN ASSISTANT

## 2022-02-25 PROCEDURE — 1101F PR PT FALLS ASSESS DOC 0-1 FALLS W/OUT INJ PAST YR: ICD-10-PCS | Mod: HCNC,CPTII,S$GLB, | Performed by: PHYSICIAN ASSISTANT

## 2022-02-25 PROCEDURE — 3078F PR MOST RECENT DIASTOLIC BLOOD PRESSURE < 80 MM HG: ICD-10-PCS | Mod: HCNC,CPTII,S$GLB, | Performed by: PHYSICIAN ASSISTANT

## 2022-02-25 PROCEDURE — 1160F RVW MEDS BY RX/DR IN RCRD: CPT | Mod: HCNC,CPTII,S$GLB, | Performed by: PHYSICIAN ASSISTANT

## 2022-02-25 PROCEDURE — 3008F PR BODY MASS INDEX (BMI) DOCUMENTED: ICD-10-PCS | Mod: HCNC,CPTII,S$GLB, | Performed by: PHYSICIAN ASSISTANT

## 2022-02-25 PROCEDURE — 3288F PR FALLS RISK ASSESSMENT DOCUMENTED: ICD-10-PCS | Mod: HCNC,CPTII,S$GLB, | Performed by: PHYSICIAN ASSISTANT

## 2022-02-25 PROCEDURE — 1101F PT FALLS ASSESS-DOCD LE1/YR: CPT | Mod: HCNC,CPTII,S$GLB, | Performed by: PHYSICIAN ASSISTANT

## 2022-02-25 PROCEDURE — 1126F AMNT PAIN NOTED NONE PRSNT: CPT | Mod: HCNC,CPTII,S$GLB, | Performed by: PHYSICIAN ASSISTANT

## 2022-02-25 RX ORDER — HYDROCORTISONE 25 MG/G
OINTMENT TOPICAL
Status: ON HOLD | COMMUNITY
Start: 2022-01-28 | End: 2023-05-30 | Stop reason: HOSPADM

## 2022-02-25 RX ORDER — PANTOPRAZOLE SODIUM 40 MG/1
40 TABLET, DELAYED RELEASE ORAL DAILY
Qty: 90 TABLET | Refills: 3 | Status: SHIPPED | OUTPATIENT
Start: 2022-02-25 | End: 2023-02-20

## 2022-02-25 RX ORDER — MONTELUKAST SODIUM 10 MG/1
10 TABLET ORAL NIGHTLY
Qty: 30 TABLET | Refills: 0 | Status: SHIPPED | OUTPATIENT
Start: 2022-02-25 | End: 2022-03-21

## 2022-02-25 NOTE — PROGRESS NOTES
"INTERNAL MEDICINE URGENT VISIT NOTE    CHIEF COMPLAINT     Chief Complaint   Patient presents with    Sinus Problem       HPI     Jessica Floyd is a 71 y.o. female who presents for an urgent visit today.    PCP is Effie Olmedo MD, patient is known to me.     Patient presents with complaints nasal congestion and sinus pressure that happens at the end of the day after sitting outside on her porch. She attributes this to seasonal allergies. She was last seen by me one month ago for similar symptoms (on a VV). She took short burst of steroids at that time with resolution of symptoms. She was prescribed abx for watchful waiting -she did not take them.     Today she has no fever, chills, nausea, vomiting, HA, dizziness, AMS. She reports persistent nasal congestion. No coughing. No chest pain so SOB. She has taken multiple COVID tests at home, they have all been negative.     She also would like a referral to continue PT for her balance therapy. She reports that she is doing well but is still not where she would like to be, would like to continue going. She is still using cane for assisted ambulation.     Finally, she is reporting right wrist pain that comes and goes, worse with lifting things. No swelling or redness. No known injury. No history of wrist trouble in the past. "It's probably arthritis but it hurts"      Past Medical History:  Past Medical History:   Diagnosis Date    Allergic rhinitis     Carotid stenosis     R CCA stent, L CCA 60%    Coronary atherosclerosis     Outside [facility] 6/2014: 20% mLAD, 50% mCx, 20%, mRCA    Dyslipidemia     ESBL (extended spectrum beta-lactamase) producing bacteria infection     UTI    Hypertension     Nausea and vomiting 5/26/2017    Subarachnoid hemorrhage due to ruptured aneurysm 1999       Home Medications:  Prior to Admission medications    Medication Sig Start Date End Date Taking? Authorizing Provider   amLODIPine (NORVASC) 10 MG tablet TAKE 1 TABLET BY MOUTH EVERY DAY " 2/11/22  Yes Michael Lal MD   aspirin (ECOTRIN) 81 MG EC tablet Take 1 tablet (81 mg total) by mouth once daily. 8/24/16 2/19/20 Yes Rafael Uriarte MD   atorvastatin (LIPITOR) 80 MG tablet TAKE 1 TABLET BY MOUTH EVERY DAY 12/28/21  Yes Brooke Macario MD   carvediloL (COREG) 12.5 MG tablet TAKE 1 TABLET BY MOUTH TWICE A DAY WITH MEALS 11/2/21  Yes Effie Olmedo MD   fluticasone propionate (FLONASE) 50 mcg/actuation nasal spray SPRAY ONCE INTO EACH NOSTRIL ONCE DAILY 5/13/19  Yes Aparna Rand MD   hydroCHLOROthiazide (HYDRODIURIL) 25 MG tablet TAKE 1 TABLET BY MOUTH EVERY DAY 1/18/22  Yes Michael Lal MD   pantoprazole (PROTONIX) 40 MG tablet TAKE 1 TABLET BY MOUTH EVERY DAY 11/30/20 2/25/22 Yes KAVITA Santamaria   hydrocortisone 2.5 % ointment as needed. 1/28/22   Historical Provider   pantoprazole (PROTONIX) 40 MG tablet Take 1 tablet (40 mg total) by mouth once daily. 2/25/22   Niyah Sheffield PA-C       Review of Systems:  Review of Systems   Constitutional: Negative for chills and fever.   HENT: Positive for congestion. Negative for sore throat and trouble swallowing.    Eyes: Negative for visual disturbance.   Respiratory: Negative for cough and shortness of breath.    Cardiovascular: Negative for chest pain.   Gastrointestinal: Negative for abdominal pain, constipation, diarrhea, nausea and vomiting.   Genitourinary: Negative for dysuria and flank pain.   Musculoskeletal: Negative for back pain, neck pain and neck stiffness.        Right wrist pain    Skin: Negative for rash.   Neurological: Negative for dizziness, syncope, weakness and headaches.   Psychiatric/Behavioral: Negative for confusion.       Health Maintainence:   Immunizations:  Health Maintenance       Date Due Completion Date    Aspirin/Antiplatelet Therapy Never done ---    DEXA Scan 02/09/2019 2/9/2016 (Done)    Override on 2/9/2016: Done    Mammogram 10/19/2020 10/19/2018    Override on 2/9/2016: Done    Shingles Vaccine  "(2 of 2) 12/03/2021 10/8/2021    COVID-19 Vaccine (4 - Booster for Pfizer series) 01/23/2022 8/23/2021    High Dose Statin 02/25/2023 2/25/2022    Colorectal Cancer Screening 02/09/2026 2/9/2016 (Done)    Override on 2/9/2016: Done    Lipid Panel 04/20/2026 4/20/2021    Override on 6/7/2016: Done    TETANUS VACCINE 08/04/2027 8/4/2017           PHYSICAL EXAM     /76   Pulse 76   Ht 5' 4" (1.626 m)   Wt 65.9 kg (145 lb 4.5 oz)   SpO2 95%   BMI 24.94 kg/m²     Physical Exam  Vitals and nursing note reviewed.   Constitutional:       Appearance: Normal appearance.      Comments: Elderly female in NAD or apparent pain. She makes good eye contact, speask in clear full sentneces and ambuatels tihe ase. SH ehas a bimal with her form hoem.      HENT:      Head: Normocephalic and atraumatic.      Nose: Nose normal.      Mouth/Throat:      Pharynx: Oropharynx is clear.   Eyes:      Conjunctiva/sclera: Conjunctivae normal.   Cardiovascular:      Rate and Rhythm: Normal rate and regular rhythm.      Pulses: Normal pulses.   Pulmonary:      Effort: No respiratory distress.   Abdominal:      Tenderness: There is no abdominal tenderness.   Musculoskeletal:         General: Normal range of motion.      Cervical back: No rigidity.      Comments: Right wrist TTP at the medial volar aspect without overlying edema or erythema. No ROM deficits. No overlying skin changes   Skin:     General: Skin is warm and dry.      Capillary Refill: Capillary refill takes less than 2 seconds.      Findings: No rash.   Neurological:      General: No focal deficit present.      Mental Status: She is alert.      Gait: Gait normal.   Psychiatric:         Mood and Affect: Mood normal.         LABS     Lab Results   Component Value Date    HGBA1C 5.4 01/11/2021     CMP  Sodium   Date Value Ref Range Status   04/20/2021 142 136 - 145 mmol/L Final     Potassium   Date Value Ref Range Status   04/20/2021 3.3 (L) 3.5 - 5.1 mmol/L Final     Chloride "   Date Value Ref Range Status   04/20/2021 100 95 - 110 mmol/L Final     CO2   Date Value Ref Range Status   04/20/2021 28 23 - 29 mmol/L Final     Glucose   Date Value Ref Range Status   04/20/2021 91 70 - 110 mg/dL Final     BUN   Date Value Ref Range Status   04/20/2021 23 8 - 23 mg/dL Final     Creatinine   Date Value Ref Range Status   04/20/2021 1.0 0.5 - 1.4 mg/dL Final     Calcium   Date Value Ref Range Status   04/20/2021 8.8 8.7 - 10.5 mg/dL Final     Total Protein   Date Value Ref Range Status   04/20/2021 7.3 6.0 - 8.4 g/dL Final     Albumin   Date Value Ref Range Status   04/20/2021 3.8 3.5 - 5.2 g/dL Final     Total Bilirubin   Date Value Ref Range Status   04/20/2021 1.0 0.1 - 1.0 mg/dL Final     Comment:     For infants and newborns, interpretation of results should be based  on gestational age, weight and in agreement with clinical  observations.    Premature Infant recommended reference ranges:  Up to 24 hours.............<8.0 mg/dL  Up to 48 hours............<12.0 mg/dL  3-5 days..................<15.0 mg/dL  6-29 days.................<15.0 mg/dL       Alkaline Phosphatase   Date Value Ref Range Status   04/20/2021 66 55 - 135 U/L Final     AST   Date Value Ref Range Status   04/20/2021 27 10 - 40 U/L Final     ALT   Date Value Ref Range Status   04/20/2021 19 10 - 44 U/L Final     Anion Gap   Date Value Ref Range Status   04/20/2021 14 8 - 16 mmol/L Final     eGFR if    Date Value Ref Range Status   04/20/2021 >60 >60 mL/min/1.73 m^2 Final     eGFR if non    Date Value Ref Range Status   04/20/2021 57 (A) >60 mL/min/1.73 m^2 Final     Comment:     Calculation used to obtain the estimated glomerular filtration  rate (eGFR) is the CKD-EPI equation.        Lab Results   Component Value Date    WBC 6.23 01/11/2021    HGB 13.3 01/11/2021    HCT 41.0 01/11/2021     (H) 01/11/2021     01/11/2021     Lab Results   Component Value Date    CHOL 202 (H)  04/20/2021    CHOL 300 (H) 01/11/2021    CHOL 218 (H) 10/27/2017     Lab Results   Component Value Date    HDL 86 (H) 04/20/2021    HDL 76 (H) 01/11/2021     (H) 10/27/2017     Lab Results   Component Value Date    LDLCALC 100.0 04/20/2021    LDLCALC 197.2 (H) 01/11/2021    LDLCALC 88.6 10/27/2017     Lab Results   Component Value Date    TRIG 80 04/20/2021    TRIG 134 01/11/2021    TRIG 127 10/27/2017     Lab Results   Component Value Date    CHOLHDL 42.6 04/20/2021    CHOLHDL 25.3 01/11/2021    CHOLHDL 47.7 10/27/2017     Lab Results   Component Value Date    TSH 1.875 01/11/2021       ASSESSMENT/PLAN     Jessica Floyd is a 71 y.o. female     Jessica was seen today for sinus problem -will start Singulair and encourage zyrtec and Flonase. Will order wrist x-ray and refer to ortho. Will renew PT Rx on pt's request -reports still feeling imbalanced and likes the progress she is making with PT. Pt will follow-up with PCP.     Diagnoses and all orders for this visit:    Right wrist pain  -     X-Ray Wrist 2 View Right; Future    Gastroesophageal reflux disease without esophagitis  -     pantoprazole (PROTONIX) 40 MG tablet; Take 1 tablet (40 mg total) by mouth once daily.  -     Ambulatory referral/consult to Physical/Occupational Therapy; Future    Acute rhinosinusitis    Balance problem    Other orders  -     montelukast (SINGULAIR) 10 mg tablet; Take 1 tablet (10 mg total) by mouth every evening.       Patient was counseled on when and how to seek emergent care.       Niyah Sheffield PA-C   Department of Internal Medicine - [facility]   8:18 AM

## 2022-02-28 ENCOUNTER — HOSPITAL ENCOUNTER (OUTPATIENT)
Dept: RADIOLOGY | Facility: OTHER | Age: 72
Discharge: HOME | End: 2022-02-28
Attending: PHYSICIAN ASSISTANT
Payer: MEDICARE

## 2022-02-28 DIAGNOSIS — M25.531 RIGHT WRIST PAIN: ICD-10-CM

## 2022-02-28 PROCEDURE — 73100 X-RAY EXAM OF WRIST: CPT | Mod: TC,HCNC,FY,RT

## 2022-02-28 PROCEDURE — 73100 X-RAY EXAM OF WRIST: CPT | Mod: 26,HCNC,RT, | Performed by: RADIOLOGY

## 2022-02-28 PROCEDURE — 73100 XR WRIST 2 VIEW RIGHT: ICD-10-PCS | Mod: 26,HCNC,RT, | Performed by: RADIOLOGY

## 2022-02-28 NOTE — PROGRESS NOTES
5:15 PM please call the patient to let her know that her x-ray shows no evidence of a fracture however there is evidence of some degenerative changes near the base of the thumb and your index finger. The area that you are having discomfort (the opposite side of your wrist) is likely due to soft tissue inflammation (strain/sprain). Please don't hesitate to reach out with questions or concerns.   -Niyah Sheffield

## 2022-03-02 ENCOUNTER — TELEPHONE (OUTPATIENT)
Dept: INTERNAL MEDICINE | Facility: CLINIC | Age: 72
End: 2022-03-02
Payer: MEDICARE

## 2022-03-02 NOTE — TELEPHONE ENCOUNTER
Yes, she can try an over the counter wrist brace to see if this provides more support and stays on - can purchase this over the counter at most pharmacies

## 2022-03-02 NOTE — TELEPHONE ENCOUNTER
----- Message from Niyah Sheffield PA-C sent at 2/28/2022  5:18 PM CST -----  5:15 PM please call the patient to let her know that her x-ray shows no evidence of a fracture however there is evidence of some degenerative changes near the base of the thumb and your index finger. The area that you are having discomfort (the opposite side of your wrist) is likely due to soft tissue inflammation (strain/sprain). Please don't hesitate to reach out with questions or concerns.   -Niyah Sheffield

## 2022-03-02 NOTE — TELEPHONE ENCOUNTER
"Spoke to pt and told her Niyah recommends:    "let her know that her x-ray shows no evidence of a fracture however there is evidence of some degenerative changes near the base of the thumb and your index finger. The area that you are having discomfort (the opposite side of your wrist) is likely due to soft tissue inflammation (strain/sprain). Please don't hesitate to reach out with questions or concerns.   -Niyah Sheffield "    Pt said the bandage Niyah gave her doesn't stay on well and she's wondering if there's anything else she should do, or if she should get a wrist brace.  I let her know I'll check with Niyah and get back with her  Pt verbalized understanding and had no further concerns or questions.    "

## 2022-03-08 ENCOUNTER — CLINICAL SUPPORT (OUTPATIENT)
Dept: REHABILITATION | Facility: OTHER | Age: 72
End: 2022-03-08
Payer: MEDICARE

## 2022-03-08 DIAGNOSIS — R26.89 BALANCE PROBLEM: Primary | ICD-10-CM

## 2022-03-08 PROCEDURE — 97112 NEUROMUSCULAR REEDUCATION: CPT | Mod: HCNC,PN,CQ

## 2022-03-08 PROCEDURE — 97110 THERAPEUTIC EXERCISES: CPT | Mod: HCNC,PN,CQ

## 2022-03-08 NOTE — PROGRESS NOTES
"[facility]   Physical Therapy Treatment Note     Name: Jessica Floyd  Clinic Number: [id number]    Therapy Diagnosis:   Encounter Diagnosis   Name Primary?    Balance problem Yes     Physician: Niyah Sheffield, KEYSHA    Physician Orders: PT Eval and Treat   Medical Diagnosis from Referral: R26.89 (ICD-10-CM) - Balance problem   Evaluation Date: 1/4/2022  Authorization Period Expiration: 4/1/22  Plan of Care Expiration: 5/16/22  Visit # / Visits authorized: 17/20  FOTO: #3/3 (52% on 1/13/2022)     Precautions: Standard and Fall     Prior Level of Function: mild limitation, caring for 4 year old grand child, walking often without cane      Time in: 0958  Time out: 1100  Total time: 30 minutes      SUBJECTIVE   States she almost fell the other day while planting flowers. States she was walking in her back yard while carrying a bucket of water and lost her balance. States she was able to catch herself before falling.     She reports compliance with HOME EXERCISE PROGRAM.  Response to previous treatment: did a lot of walking over the weekend and is feeling a little soreness in her leg muscles   Function:  has noticed that she is negotiating stairs more confidently, continues with use of cane and still feels impacted by fear avoidance      Pain: 0/10  Location: Pt denies pain today     OBJECTIVE   2/16/22:  Observation: Significant fear avoidance (of falling) during assessment and treatment today.  Positional and transitional tolerance impacted by dizziness/shortness of breath. L visual field begins around 30 degrees from midline.     Posture: forward flexed at hips with center of gravity forward past base of support.     Gait: walks with cane as "security blanket" on curbs      Range of Motion: AROM:  Hip Left  Right    Flexion 116 109      Knee Left  Right    Extension 0 0   Flexion 132 142      Ankle Left  Right    Dorsiflexion 7 6   Plantarflexion 51 40      Strength:  Hip Left  " "Right    Flexion 4/5 4/5   Abduction 4/5 in sitting  4/5 in sitting    Adduction 4/5 in sitting  4/5 in sitting   Extension 3-  Difficulty clearing mat  4- with hip substitution movements       Knee Left  Right   Extension 4/5 4-/5      Ankle Left  Right    Dorsiflexion 4/5 4/5   Plantarflexion 25 heel raises with HRA  25 heel raises with HRA      Special Tests:    Left Right   Slump  Negative  Negative       Function:   Sit - stand:12 X in 30"   pushes with hands on chair      TUG:  Trial 1: 14" SBA  Trial 2: 14" SBA  Trial 3: 14" SBA  Average: 14"     Sensation: light touch sensation intact and equal bilaterally      Static Balance    Left  Right    SLS   Unsteadiness with Unilateral  HRA Unsteadiness with Unilateral HRA    Rhomberg stand, EO, firm surface  25", 30"CGA    Pt unable to let go of // bars completely       Rhomberg stand, EC, firm surface 7", 25" CGA, feet not completely toucing        Rhomberg srand, EO, unsteady surface 7", 18", 26" CGA        Rhomberg stand, EC, unsteady surface  Not appropriate to test at this time       Modified tandem stand    Unable to attempt - patient fearful and hesitant to let go of cane, also holding on to PT 30" CGA       Limitation/Restriction for FOTO Balance Survey     Therapist reviewed FOTO scores for Jessica Floyd on 1/13/2022.   FOTO documents entered into [email or web address] - see Media section.     Limitation Score: 52%  Predicted Score: 58%          TREATMENT   Charges based on 1-1 tx:  Therapeutic exercises for 15 minutes:  Heel raises, B HRA, 3x10  Standing hip abd, R/L, 3x10 repetitions  Standing hip extension 3x10 repetitions  Seated hip IR with anne band at ankles, R/L 3x10   Bridges 3x10 repetitions, 3" hold up  Supine Hip adduction ball squeeze 30x 5"   Clam shell in R/L SL with anne band, 3x10  SLR 3x10 R/L  Seated orange band pull apart overhead, airex mat in chair, 3x10    Neuromuscular reeducation for 15 minutes:  Gait belt donned for all balance exercises  Alt LE " "marches 2 laps x40 feet each   Weight shifting - ball/ring toss, cone stepping various directions with response to cues   Step ups on 6" step with one HR CGA 3x10 reps   Step downs on 6" step with one HR CGA 3x10 reps  NBOS on airex foam EC 3x30" ea  Marching on airex form 3x60" - fingertip support prn  Sharpened Rhomberg stance 2x30" ea alt LE    Forward and side stepping over agility cones  +Bending down picking up cones and handing them to clinician  with high reach x12 cones  +Ej stepping x6 hurdles x4 - CGA  Standing on airex mat, catching/tossing ball, 4x5       Therapeutic activities for 00 minutes:  Sit <> stand and one foam cushion 3x10  Leg press machine: double limb -> single limb, from low point, 2 black cords     PATIENT EDUCATION AND HOME EXERCISES      Home Exercises Provided and Patient Education Provided      Education provided:   exercise form and rationale       Written Home Exercises Provided: Patient instructed to cont prior HEP. Exercises were reviewed and Jessica was able to demonstrate them prior to the end of the session.  Jessica demonstrated good  understanding of the education provided. See EMR under Patient Instructions for exercises provided during therapy sessions     ASSESSMENT   Pt tolerated exercise fair. Postural sway continues to be noted with dynamic exercises with reduced ASHLEY. Slightly decreased apprehension with frequent cuing to increase step height and perform exercise slower.            Jessica is making fair progress towards meeting set goals.   Pt prognosis is Fair.      Pt will continue to benefit from skilled outpatient physical therapy to address the deficits listed in the problem list box on initial evaluation, provide pt/family education and to maximize pt's level of independence in the home and community environment.      Pt's spiritual, cultural and educational needs considered and pt agreeable to plan of care and goals.     Anticipated barriers to physical " "therapy: fear avoidance      Goals:  Short Term Goals: 4 weeks   (1) patient will be I with HOME EXERCISE PROGRAM (MET, 1/11/22)  (2) patient will complete 13 sit - stand transfers within 30" to facilitate improved transitional tolerance throughout ADLs (MET, 2/16/22)  (3) patient will complete 25 heel raises, no HRA, to facilitate improved static balance when reaching into upper cabinets (ongoing, not met, 1/20/2022)      Long Term Goals: 8 weeks   (1) patient will participate in modified tandem stand with L LE in front for 30" with CGA to minimize risk of fall (MET, 2/16/22)  (2) patient will participate in modified Rhomberg stand, EC, firm surface for 25" with CGA to minimize risk of fall (met, 1/20/2022)   (3) patient will average >13.5" TUG trials to facilitate decreased risk of fall during community mobility tasks (progressing, not met, 1/20/2022)     Plan   Focus on improving balance/proprioception exercises with emphasis on ADL performance.     Updated Certification Period: 3/8/2022 to 5/16/22  Outpatient Physical Therapy 2 times weekly for 4 weeks to include the following interventions: Gait Training, Manual Therapy, Moist Heat/ Ice, Neuromuscular Re-ed, Patient Education, Self Care, Therapeutic Activities and Therapeutic Exercise.        Harsha Benitez, PTA  3/8/2022                                "

## 2022-03-10 ENCOUNTER — CLINICAL SUPPORT (OUTPATIENT)
Dept: REHABILITATION | Facility: OTHER | Age: 72
End: 2022-03-10
Payer: MEDICARE

## 2022-03-10 DIAGNOSIS — R26.89 BALANCE PROBLEM: Primary | ICD-10-CM

## 2022-03-10 DIAGNOSIS — K21.9 GASTROESOPHAGEAL REFLUX DISEASE WITHOUT ESOPHAGITIS: ICD-10-CM

## 2022-03-10 PROCEDURE — 97110 THERAPEUTIC EXERCISES: CPT | Mod: HCNC,PN

## 2022-03-10 PROCEDURE — 97112 NEUROMUSCULAR REEDUCATION: CPT | Mod: HCNC,PN

## 2022-03-10 NOTE — PROGRESS NOTES
"[facility]   Physical Therapy Treatment Note     Name: Jessica Floyd  Clinic Number: [id number]    Therapy Diagnosis:   Encounter Diagnosis   Name Primary?    Balance problem Yes     Physician: Niyah Sheffield, KEYSHA    Physician Orders: PT Eval and Treat   Medical Diagnosis from Referral: R26.89 (ICD-10-CM) - Balance problem   Evaluation Date: 1/4/2022  Authorization Period Expiration: 4/1/22  Plan of Care Expiration: 5/16/22  Visit # / Visits authorized: 18/20  FOTO: #3/3 (52% on 1/13/2022)     Precautions: Standard and Fall     Prior Level of Function: mild limitation, caring for 4 year old grand child, walking often without cane      Time in: 08:28am (late arrival)   Time out: 09:26am  Total time:  58 minutes      SUBJECTIVE   She reports compliance with HOME EXERCISE PROGRAM.  Response to previous treatment: notes improved balance reactions holding objects and walking on unsteady surface   Function:  has noticed that she is negotiating stairs more confidently, continues with use of cane and still feels impacted by fear avoidance      Pain: 0/10  Location: Pt denies pain today     OBJECTIVE   2/16/22:  Observation: Significant fear avoidance (of falling) during assessment and treatment today.  Positional and transitional tolerance impacted by dizziness/shortness of breath. L visual field begins around 30 degrees from midline.     Posture: forward flexed at hips with center of gravity forward past base of support.     Gait: walks with cane as "security blanket" on curbs      Range of Motion: AROM:  Hip Left  Right    Flexion 116 109      Knee Left  Right    Extension 0 0   Flexion 132 142      Ankle Left  Right    Dorsiflexion 7 6   Plantarflexion 51 40      Strength:  Hip Left  Right    Flexion 4/5 4/5   Abduction 4/5 in sitting  4/5 in sitting    Adduction 4/5 in sitting  4/5 in sitting   Extension 3-  Difficulty clearing mat  4- with hip substitution movements       Knee Left " " Right   Extension 4/5 4-/5      Ankle Left  Right    Dorsiflexion 4/5 4/5   Plantarflexion 25 heel raises with HRA  25 heel raises with HRA      Special Tests:    Left Right   Slump  Negative  Negative       Function:   Sit - stand:12 X in 30"   pushes with hands on chair      TUG:  Trial 1: 14" SBA  Trial 2: 14" SBA  Trial 3: 14" SBA  Average: 14"     Sensation: light touch sensation intact and equal bilaterally      Static Balance    Left  Right    SLS   Unsteadiness with Unilateral  HRA Unsteadiness with Unilateral HRA    Rhomberg stand, EO, firm surface  25", 30"CGA    Pt unable to let go of // bars completely       Rhomberg stand, EC, firm surface 7", 25" CGA, feet not completely toucing        Rhomberg srand, EO, unsteady surface 7", 18", 26" CGA        Rhomberg stand, EC, unsteady surface  Not appropriate to test at this time       Modified tandem stand    Unable to attempt - patient fearful and hesitant to let go of cane, also holding on to PT 30" CGA       Limitation/Restriction for FOTO Balance Survey     Therapist reviewed FOTO scores for Jessica Floyd on 1/13/2022.   FOTO documents entered into [employer] - see Media section.     Limitation Score: 52%  Predicted Score: 58%          TREATMENT   Charges based on 1-1 tx:  Therapeutic exercises for 25 minutes:  Heel raises, B HRA, 3x10  Standing hip abd, R/L, anne band, 3x10 repetitions  Standing hip extension, anne band, 3x10 repetitions  Seated hip IR with anne band at ankles, R/L 3x10     Bridges 3x10 repetitions, 3" hold up  Hip adduction ball squeeze 30x 5"   Clam shell in R/L SL with anne band, 3x10  SLR 3x10 R/L  Seated orange band pull apart overhead, airex mat in chair, 3x10    Neuromuscular reeducation for 33 minutes:  Gait belt donned for all balance exercises  Alt LE marches 2 laps x40 feet each   Weight shifting - ball/ring toss, cone stepping various directions with response to cues   Step ups on 6" step with one HR CGA 3x10 reps   Step downs on " "6" step with one HR CGA 3x10 reps  NBOS on airex foam EC 3x30" ea  Marching on airex form 3x60" - fingertip support prn  Sharpened Rhomberg stance 2x30" ea alt LE    Forward and side stepping over agility cones  +Bending down picking up cones and handing them to clinician  with high reach x12 cones  +Ej stepping x6 hurdles x4 - CGA  Standing on airex mat, catching/tossing ball, 4x5       Therapeutic activities for 00 minutes:  Sit <> stand and one foam cushion 3x10  Leg press machine: double limb -> single limb, from low point, 2 black cords     PATIENT EDUCATION AND HOME EXERCISES      Home Exercises Provided and Patient Education Provided      Education provided:   exercise form and rationale       Written Home Exercises Provided: Patient instructed to cont prior HEP. Exercises were reviewed and Jessica was able to demonstrate them prior to the end of the session.  Jessica demonstrated good  understanding of the education provided. See EMR under Patient Instructions for exercises provided during therapy sessions     ASSESSMENT   Pt tolerated exercise fair. Postural sway continues to be noted with dynamic exercises with reduced ASHLEY. Slightly decreased apprehension with frequent cuing to increase step height and perform exercise slower. Decreased fear avoidance with dynamic tasks today.            Jessica is making fair progress towards meeting set goals.   Pt prognosis is Fair.      Pt will continue to benefit from skilled outpatient physical therapy to address the deficits listed in the problem list box on initial evaluation, provide pt/family education and to maximize pt's level of independence in the home and community environment.      Pt's spiritual, cultural and educational needs considered and pt agreeable to plan of care and goals.     Anticipated barriers to physical therapy: fear avoidance      Goals:  Short Term Goals: 4 weeks   (1) patient will be I with HOME EXERCISE PROGRAM (MET, 1/11/22)  (2) " "patient will complete 13 sit - stand transfers within 30" to facilitate improved transitional tolerance throughout ADLs (MET, 2/16/22)  (3) patient will complete 25 heel raises, no HRA, to facilitate improved static balance when reaching into upper cabinets (ongoing, not met, 1/20/2022)      Long Term Goals: 8 weeks   (1) patient will participate in modified tandem stand with L LE in front for 30" with CGA to minimize risk of fall (MET, 2/16/22)  (2) patient will participate in modified Rhomberg stand, EC, firm surface for 25" with CGA to minimize risk of fall (met, 1/20/2022)   (3) patient will average >13.5" TUG trials to facilitate decreased risk of fall during community mobility tasks (progressing, not met, 1/20/2022)     Plan   Focus on improving balance/proprioception exercises with emphasis on ADL performance.     Updated Certification Period: 3/10/2022 to 5/16/22  Outpatient Physical Therapy 2 times weekly for 4 weeks to include the following interventions: Gait Training, Manual Therapy, Moist Heat/ Ice, Neuromuscular Re-ed, Patient Education, Self Care, Therapeutic Activities and Therapeutic Exercise.        Mona Pierre, PT  3/10/2022                                  "

## 2022-03-16 ENCOUNTER — CLINICAL SUPPORT (OUTPATIENT)
Dept: REHABILITATION | Facility: OTHER | Age: 72
End: 2022-03-16
Payer: MEDICARE

## 2022-03-16 ENCOUNTER — APPOINTMENT (OUTPATIENT)
Dept: RADIOLOGY | Facility: OTHER | Age: 72
End: 2022-03-16
Attending: PODIATRIST
Payer: MEDICARE

## 2022-03-16 ENCOUNTER — PATIENT OUTREACH (OUTPATIENT)
Dept: ADMINISTRATIVE | Facility: OTHER | Age: 72
End: 2022-03-16
Payer: MEDICARE

## 2022-03-16 ENCOUNTER — OFFICE VISIT (OUTPATIENT)
Dept: PODIATRY | Facility: CLINIC | Age: 72
End: 2022-03-16
Payer: MEDICARE

## 2022-03-16 VITALS
DIASTOLIC BLOOD PRESSURE: 82 MMHG | BODY MASS INDEX: 24.75 KG/M2 | SYSTOLIC BLOOD PRESSURE: 152 MMHG | HEIGHT: 64 IN | HEART RATE: 80 BPM | WEIGHT: 145 LBS

## 2022-03-16 DIAGNOSIS — R26.89 BALANCE PROBLEM: Primary | ICD-10-CM

## 2022-03-16 DIAGNOSIS — M24.573 EQUINUS CONTRACTURE OF ANKLE: ICD-10-CM

## 2022-03-16 DIAGNOSIS — M20.41 HAMMER TOE OF RIGHT FOOT: ICD-10-CM

## 2022-03-16 DIAGNOSIS — M79.671 FOOT PAIN, RIGHT: ICD-10-CM

## 2022-03-16 DIAGNOSIS — M77.9 CAPSULITIS: ICD-10-CM

## 2022-03-16 DIAGNOSIS — Z12.31 ENCOUNTER FOR SCREENING MAMMOGRAM FOR MALIGNANT NEOPLASM OF BREAST: Primary | ICD-10-CM

## 2022-03-16 DIAGNOSIS — M24.50 JOINT CONTRACTURE: ICD-10-CM

## 2022-03-16 DIAGNOSIS — S96.911A STRAIN OF FOOT, RIGHT, INITIAL ENCOUNTER: ICD-10-CM

## 2022-03-16 DIAGNOSIS — M77.9 CAPSULITIS: Primary | ICD-10-CM

## 2022-03-16 PROCEDURE — 99999 PR PBB SHADOW E&M-EST. PATIENT-LVL III: ICD-10-PCS | Mod: PBBFAC,HCNC,, | Performed by: PODIATRIST

## 2022-03-16 PROCEDURE — 3077F PR MOST RECENT SYSTOLIC BLOOD PRESSURE >= 140 MM HG: ICD-10-PCS | Mod: HCNC,CPTII,S$GLB, | Performed by: PODIATRIST

## 2022-03-16 PROCEDURE — 3288F PR FALLS RISK ASSESSMENT DOCUMENTED: ICD-10-PCS | Mod: HCNC,CPTII,S$GLB, | Performed by: PODIATRIST

## 2022-03-16 PROCEDURE — 1159F PR MEDICATION LIST DOCUMENTED IN MEDICAL RECORD: ICD-10-PCS | Mod: HCNC,CPTII,S$GLB, | Performed by: PODIATRIST

## 2022-03-16 PROCEDURE — 3077F SYST BP >= 140 MM HG: CPT | Mod: HCNC,CPTII,S$GLB, | Performed by: PODIATRIST

## 2022-03-16 PROCEDURE — 1101F PT FALLS ASSESS-DOCD LE1/YR: CPT | Mod: HCNC,CPTII,S$GLB, | Performed by: PODIATRIST

## 2022-03-16 PROCEDURE — 1159F MED LIST DOCD IN RCRD: CPT | Mod: HCNC,CPTII,S$GLB, | Performed by: PODIATRIST

## 2022-03-16 PROCEDURE — 3288F FALL RISK ASSESSMENT DOCD: CPT | Mod: HCNC,CPTII,S$GLB, | Performed by: PODIATRIST

## 2022-03-16 PROCEDURE — 3008F PR BODY MASS INDEX (BMI) DOCUMENTED: ICD-10-PCS | Mod: HCNC,CPTII,S$GLB, | Performed by: PODIATRIST

## 2022-03-16 PROCEDURE — 97110 THERAPEUTIC EXERCISES: CPT | Mod: HCNC,PN,CQ

## 2022-03-16 PROCEDURE — 99204 PR OFFICE/OUTPT VISIT, NEW, LEVL IV, 45-59 MIN: ICD-10-PCS | Mod: 25,HCNC,S$GLB, | Performed by: PODIATRIST

## 2022-03-16 PROCEDURE — 99204 OFFICE O/P NEW MOD 45 MIN: CPT | Mod: 25,HCNC,S$GLB, | Performed by: PODIATRIST

## 2022-03-16 PROCEDURE — 1101F PR PT FALLS ASSESS DOC 0-1 FALLS W/OUT INJ PAST YR: ICD-10-PCS | Mod: HCNC,CPTII,S$GLB, | Performed by: PODIATRIST

## 2022-03-16 PROCEDURE — 29540 PR STRAPPING; ANKLE &/OR FOOT: ICD-10-PCS | Mod: HCNC,RT,S$GLB, | Performed by: PODIATRIST

## 2022-03-16 PROCEDURE — 29540 STRAPPING ANKLE &/FOOT: CPT | Mod: HCNC,RT,S$GLB, | Performed by: PODIATRIST

## 2022-03-16 PROCEDURE — 1125F PR PAIN SEVERITY QUANTIFIED, PAIN PRESENT: ICD-10-PCS | Mod: HCNC,CPTII,S$GLB, | Performed by: PODIATRIST

## 2022-03-16 PROCEDURE — 73630 X-RAY EXAM OF FOOT: CPT | Mod: TC,HCNC,RT

## 2022-03-16 PROCEDURE — 99999 PR PBB SHADOW E&M-EST. PATIENT-LVL III: CPT | Mod: PBBFAC,HCNC,, | Performed by: PODIATRIST

## 2022-03-16 PROCEDURE — 3079F PR MOST RECENT DIASTOLIC BLOOD PRESSURE 80-89 MM HG: ICD-10-PCS | Mod: HCNC,CPTII,S$GLB, | Performed by: PODIATRIST

## 2022-03-16 PROCEDURE — 1160F PR REVIEW ALL MEDS BY PRESCRIBER/CLIN PHARMACIST DOCUMENTED: ICD-10-PCS | Mod: HCNC,CPTII,S$GLB, | Performed by: PODIATRIST

## 2022-03-16 PROCEDURE — 1160F RVW MEDS BY RX/DR IN RCRD: CPT | Mod: HCNC,CPTII,S$GLB, | Performed by: PODIATRIST

## 2022-03-16 PROCEDURE — 1125F AMNT PAIN NOTED PAIN PRSNT: CPT | Mod: HCNC,CPTII,S$GLB, | Performed by: PODIATRIST

## 2022-03-16 PROCEDURE — 3008F BODY MASS INDEX DOCD: CPT | Mod: HCNC,CPTII,S$GLB, | Performed by: PODIATRIST

## 2022-03-16 PROCEDURE — 73630 X-RAY EXAM OF FOOT: CPT | Mod: 26,HCNC,RT, | Performed by: INTERNAL MEDICINE

## 2022-03-16 PROCEDURE — 3079F DIAST BP 80-89 MM HG: CPT | Mod: HCNC,CPTII,S$GLB, | Performed by: PODIATRIST

## 2022-03-16 PROCEDURE — 73630 XR FOOT COMPLETE 3 VIEW RIGHT: ICD-10-PCS | Mod: 26,HCNC,RT, | Performed by: INTERNAL MEDICINE

## 2022-03-16 PROCEDURE — 97112 NEUROMUSCULAR REEDUCATION: CPT | Mod: HCNC,PN,CQ

## 2022-03-16 NOTE — PROGRESS NOTES
"[facility]   Physical Therapy Treatment Note     Name: Jessica Floyd  Clinic Number: [id number]    Therapy Diagnosis:   Encounter Diagnosis   Name Primary?    Balance problem Yes     Physician: Niyah Sheffield PA-C    Physician Orders: PT Eval and Treat   Medical Diagnosis from Referral: R26.89 (ICD-10-CM) - Balance problem   Evaluation Date: 1/4/2022  Authorization Period Expiration: 4/1/22  Plan of Care Expiration: 5/16/22  Visit # / Visits authorized: 19/20  FOTO: #3/3 (52% on 1/13/2022)     Precautions: Standard and Fall     Prior Level of Function: mild limitation, caring for 4 year old grand child, walking often without cane      Time in: 0756  Time out: 0850  Total time:  38 minutes      SUBJECTIVE   States she feels like her balance is getting much better since beginning PT. States last night she accidentally stepped onto a tennis ball that was on the floor and was able to maintain her balance without falling. States she would not have been able to do that a few weeks ago.     She reports compliance with HOME EXERCISE PROGRAM.  Response to previous treatment: notes improved balance reactions holding objects and walking on unsteady surface   Function:  has noticed that she is negotiating stairs more confidently, continues with use of cane and still feels impacted by fear avoidance      Pain: 0/10  Location: Pt denies pain today     OBJECTIVE   2/16/22:  Observation: Significant fear avoidance (of falling) during assessment and treatment today.  Positional and transitional tolerance impacted by dizziness/shortness of breath. L visual field begins around 30 degrees from midline.     Posture: forward flexed at hips with center of gravity forward past base of support.     Gait: walks with cane as "security blanket" on curbs      Range of Motion: AROM:  Hip Left  Right    Flexion 116 109      Knee Left  Right    Extension 0 0   Flexion 132 142      Ankle Left  Right  " "  Dorsiflexion 7 6   Plantarflexion 51 40      Strength:  Hip Left  Right    Flexion 4/5 4/5   Abduction 4/5 in sitting  4/5 in sitting    Adduction 4/5 in sitting  4/5 in sitting   Extension 3-  Difficulty clearing mat  4- with hip substitution movements       Knee Left  Right   Extension 4/5 4-/5      Ankle Left  Right    Dorsiflexion 4/5 4/5   Plantarflexion 25 heel raises with HRA  25 heel raises with HRA      Special Tests:    Left Right   Slump  Negative  Negative       Function:   Sit - stand:12 X in 30"   pushes with hands on chair      TUG:  Trial 1: 14" SBA  Trial 2: 14" SBA  Trial 3: 14" SBA  Average: 14"     Sensation: light touch sensation intact and equal bilaterally      Static Balance    Left  Right    SLS   Unsteadiness with Unilateral  HRA Unsteadiness with Unilateral HRA    Rhomberg stand, EO, firm surface  25", 30"CGA    Pt unable to let go of // bars completely       Rhomberg stand, EC, firm surface 7", 25" CGA, feet not completely toucing        Rhomberg srand, EO, unsteady surface 7", 18", 26" CGA        Rhomberg stand, EC, unsteady surface  Not appropriate to test at this time       Modified tandem stand    Unable to attempt - patient fearful and hesitant to let go of cane, also holding on to PT 30" CGA       Limitation/Restriction for FOTO Balance Survey     Therapist reviewed FOTO scores for Jessica Floyd on 1/13/2022.   FOTO documents entered into [employer] - see Media section.     Limitation Score: 52%  Predicted Score: 58%          TREATMENT   Charges based on 1-1 tx:  Therapeutic exercises for  8 minutes:  Heel raises, B HRA, 3x10   Standing hip abd, R/L, anne band, 3x10 repetitions  Standing hip extension, anne band, 3x10 repetitions  Seated hip IR with anne band at ankles, R/L 3x10     Bridges 3x10 repetitions, 3" hold up  Hip adduction ball squeeze 30x 5"   Clam shell in R/L SL with anne band, 3x10  SLR 3x10 R/L  Seated orange band pull apart overhead, airex mat in chair, " "3x10    Neuromuscular reeducation for 30 minutes:  Gait belt donned for all balance exercises  Alt LE marches 2 laps x40 feet each   Weight shifting - ball/ring toss, cone stepping various directions with response to cues   Step ups on 6" step with one HR CGA 3x10 reps   Step downs on 6" step with one HR CGA 3x10 reps  NBOS on airex foam EC 3x30" ea  Marching on airex form 3x60" - fingertip support prn  Sharpened Rhomberg stance 2x30" ea alt LE    Forward and side stepping over agility cones  +Bending down picking up cones and handing them to clinician  with high reach x12 cones  +Ej stepping x6 hurdles x4 - CGA  Standing on airex mat, catching/tossing ball, 4x5   +Lateral steps 40 x 2 laps      Therapeutic activities for 00 minutes:  Sit <> stand and one foam cushion 3x10  Leg press machine: double limb -> single limb, from low point, 2 black cords     PATIENT EDUCATION AND HOME EXERCISES      Home Exercises Provided and Patient Education Provided      Education provided:   exercise form and rationale       Written Home Exercises Provided: Patient instructed to cont prior HEP. Exercises were reviewed and Jessica was able to demonstrate them prior to the end of the session.  Jessica demonstrated good  understanding of the education provided. See EMR under Patient Instructions for exercises provided during therapy sessions     ASSESSMENT   Observed pt with rounded shoulders and forward head posture. Forward sway observed with NBOS on airex mat. Pt tolerated treatment well with moderate rest in between exercises. Verbal cueing to maintain upright posture with dynamic balance exercises. Gait belt used throughout treatment for added safety. Instructed to continue with HEP to help build endurance, strength, and confidence with balance. Pt requested to cut treatment time due to conflicting appointment.              Jessica is making fair progress towards meeting set goals.   Pt prognosis is Fair.      Pt will continue to " "benefit from skilled outpatient physical therapy to address the deficits listed in the problem list box on initial evaluation, provide pt/family education and to maximize pt's level of independence in the home and community environment.      Pt's spiritual, cultural and educational needs considered and pt agreeable to plan of care and goals.     Anticipated barriers to physical therapy: fear avoidance      Goals:  Short Term Goals: 4 weeks   (1) patient will be I with HOME EXERCISE PROGRAM (MET, 1/11/22)  (2) patient will complete 13 sit - stand transfers within 30" to facilitate improved transitional tolerance throughout ADLs (MET, 2/16/22)  (3) patient will complete 25 heel raises, no HRA, to facilitate improved static balance when reaching into upper cabinets (ongoing, not met, 1/20/2022)      Long Term Goals: 8 weeks   (1) patient will participate in modified tandem stand with L LE in front for 30" with CGA to minimize risk of fall (MET, 2/16/22)  (2) patient will participate in modified Rhomberg stand, EC, firm surface for 25" with CGA to minimize risk of fall (met, 1/20/2022)   (3) patient will average >13.5" TUG trials to facilitate decreased risk of fall during community mobility tasks (progressing, not met, 1/20/2022)     Plan   Focus on improving balance/proprioception exercises with emphasis on ADL performance.     Updated Certification Period: 3/16/2022 to 5/16/22  Outpatient Physical Therapy 2 times weekly for 4 weeks to include the following interventions: Gait Training, Manual Therapy, Moist Heat/ Ice, Neuromuscular Re-ed, Patient Education, Self Care, Therapeutic Activities and Therapeutic Exercise.        Pt was co-treated by JOSE Lee under the direct supervision of a Licensed Physical Therapist Assistant    JOSE Lee, PTA  3/16/2022                                    "

## 2022-03-16 NOTE — PROGRESS NOTES
Care Everywhere: updated  Immunization: updated  Health Maintenance: updated  Media Review: review for outside mammogram report   Legacy Review:   DIS: no profile in portal   Order placed: mammogram   Upcoming appts:  EFAX:  Task Tickets:  Referrals:

## 2022-03-16 NOTE — PROGRESS NOTES
Subjective:      Patient ID: Jessica Floyd is a 71 y.o. female.    Chief Complaint: Foot Pain (R foot, with callous)    Sharp pain bottom right forefoot.  Gradual onset, worsening over past several weeks, aggravated by increased weight bearing, shoe gear, pressure.  No previous medical treatment.  OTC pain med not helping. Denies trauma.  3 surgeries right foot.    Review of Systems   Constitutional: Negative for chills, diaphoresis, fever, malaise/fatigue and night sweats.   Cardiovascular: Negative for claudication, cyanosis, leg swelling and syncope.   Skin: Negative for color change, dry skin, nail changes, rash, suspicious lesions and unusual hair distribution.   Musculoskeletal: Positive for joint pain. Negative for falls, joint swelling, muscle cramps, muscle weakness and stiffness.   Gastrointestinal: Negative for constipation, diarrhea, nausea and vomiting.   Neurological: Negative for brief paralysis, disturbances in coordination, focal weakness, numbness, paresthesias, sensory change and tremors.           Objective:      Physical Exam  Constitutional:       General: She is not in acute distress.     Appearance: She is well-developed. She is not diaphoretic.   Cardiovascular:      Pulses:           Popliteal pulses are 2+ on the right side and 2+ on the left side.        Dorsalis pedis pulses are 2+ on the right side and 2+ on the left side.        Posterior tibial pulses are 2+ on the right side and 2+ on the left side.      Comments: Capillary refill 3 seconds all toes/distal feet, all toes/both feet warm to touch.      Negative lymphadenopathy bilateral popliteal fossa and tarsal tunnel.      Negavie lower extremity edema bilateral.    Musculoskeletal:      Right ankle: No swelling, deformity, ecchymosis or lacerations. Normal range of motion. Normal pulse.      Right Achilles Tendon: Normal. No defects. Lopez's test negative.   Lymphadenopathy:      Lower Body: No right inguinal adenopathy. No left  inguinal adenopathy.      Comments: Negative lymphadenopathy bilateral popliteal fossa and tarsal tunnel.    Negative lymphangitic streaking bilateral feet/ankles/legs.   Skin:     General: Skin is warm and dry.      Capillary Refill: Capillary refill takes 2 to 3 seconds.      Coloration: Skin is not pale.      Findings: No abrasion, bruising, burn, ecchymosis, erythema, laceration, lesion or rash.      Nails: There is no clubbing.      Comments:      Neurological:      Mental Status: She is alert and oriented to person, place, and time.      Sensory: No sensory deficit.      Motor: No tremor, atrophy or abnormal muscle tone.      Gait: Gait normal.      Deep Tendon Reflexes:      Reflex Scores:       Patellar reflexes are 2+ on the right side and 2+ on the left side.       Achilles reflexes are 2+ on the right side and 2+ on the left side.  Psychiatric:         Behavior: Behavior is cooperative.               Assessment:       Encounter Diagnoses   Name Primary?    Capsulitis Yes    Foot pain, right     Hammer toe of right foot     Joint contracture     Equinus contracture of ankle          Plan:       Jessica was seen today for foot pain.    Diagnoses and all orders for this visit:    Capsulitis  -     X-Ray Foot Complete Right; Future    Foot pain, right  -     X-Ray Foot Complete Right; Future    Hammer toe of right foot  -     X-Ray Foot Complete Right; Future    Joint contracture  -     X-Ray Foot Complete Right; Future    Equinus contracture of ankle  -     X-Ray Foot Complete Right; Future      I counseled the patient on her conditions, their implications and medical management.        Patient will stretch the tendo achilles complex three times daily as demonstrated in the office.  Literature was dispensed illustrating proper stretching technique.  .  I applied a plantar rest strapping to the patient's right foot to offload symptomatic area, support the arch, and relieve pain.    Patient will obtain  over the counter arch supports and wear them in shoes whenever possible.  Athletic shoes intended for walking or running are usually best.    Discussed conservative treatment with shoes of adequate dimensions, material, and style to alleviate symptoms and delay or prevent surgical intervention.    Lidocaine            Follow up in about 1 month (around 4/16/2022).

## 2022-03-18 ENCOUNTER — CLINICAL SUPPORT (OUTPATIENT)
Dept: REHABILITATION | Facility: OTHER | Age: 72
End: 2022-03-18
Payer: MEDICARE

## 2022-03-18 DIAGNOSIS — R26.89 BALANCE PROBLEM: Primary | ICD-10-CM

## 2022-03-18 PROCEDURE — 97112 NEUROMUSCULAR REEDUCATION: CPT | Mod: HCNC,PN,CQ

## 2022-03-18 PROCEDURE — 97110 THERAPEUTIC EXERCISES: CPT | Mod: HCNC,PN,CQ

## 2022-03-18 NOTE — PROGRESS NOTES
"[facility]   Physical Therapy Treatment Note     Name: Jessica Floyd  Clinic Number: [id number]    Therapy Diagnosis:   Encounter Diagnosis   Name Primary?    Balance problem Yes     Physician: Niyah Sheffield, KEYSHA    Physician Orders: PT Eval and Treat   Medical Diagnosis from Referral: R26.89 (ICD-10-CM) - Balance problem   Evaluation Date: 1/4/2022  Authorization Period Expiration: 4/1/22  Plan of Care Expiration: 5/16/22  Visit # / Visits authorized: 20/20  FOTO: #3/3 (52% on 1/13/2022)     Precautions: Standard and Fall     Prior Level of Function: mild limitation, caring for 4 year old grand child, walking often without cane      Time in: 0800  Time out: 0900  Total time: 53 minutes      SUBJECTIVE   States she has been having some L knee pain lately and is looking for an orthopedist MD to look at her knee. States she saw her podiatrist about her R foot and he bandaged it on wed. State she is feeling some pain still in the foot and would like to take it easy today.     She reports compliance with HOME EXERCISE PROGRAM.  Response to previous treatment: notes improved balance reactions holding objects and walking on unsteady surface   Function:  has noticed that she is negotiating stairs more confidently, continues with use of cane and still feels impacted by fear avoidance      Pain: 0/10  Location: Pt denies pain today     OBJECTIVE   2/16/22:  Observation: Significant fear avoidance (of falling) during assessment and treatment today.  Positional and transitional tolerance impacted by dizziness/shortness of breath. L visual field begins around 30 degrees from midline.     Posture: forward flexed at hips with center of gravity forward past base of support.     Gait: walks with cane as "security blanket" on curbs      Range of Motion: AROM:  Hip Left  Right    Flexion 116 109      Knee Left  Right    Extension 0 0   Flexion 132 142      Ankle Left  Right    Dorsiflexion 7 6 " "  Plantarflexion 51 40      Strength:  Hip Left  Right    Flexion 4/5 4/5   Abduction 4/5 in sitting  4/5 in sitting    Adduction 4/5 in sitting  4/5 in sitting   Extension 3-  Difficulty clearing mat  4- with hip substitution movements       Knee Left  Right   Extension 4/5 4-/5      Ankle Left  Right    Dorsiflexion 4/5 4/5   Plantarflexion 25 heel raises with HRA  25 heel raises with HRA      Special Tests:    Left Right   Slump  Negative  Negative       Function:   Sit - stand:12 X in 30"   pushes with hands on chair      TUG:  Trial 1: 14" SBA  Trial 2: 14" SBA  Trial 3: 14" SBA  Average: 14"     Sensation: light touch sensation intact and equal bilaterally      Static Balance    Left  Right    SLS   Unsteadiness with Unilateral  HRA Unsteadiness with Unilateral HRA    Rhomberg stand, EO, firm surface  25", 30"CGA    Pt unable to let go of // bars completely       Rhomberg stand, EC, firm surface 7", 25" CGA, feet not completely toucing        Rhomberg srand, EO, unsteady surface 7", 18", 26" CGA        Rhomberg stand, EC, unsteady surface  Not appropriate to test at this time       Modified tandem stand    Unable to attempt - patient fearful and hesitant to let go of cane, also holding on to PT 30" CGA       Limitation/Restriction for FOTO Balance Survey     Therapist reviewed FOTO scores for Jessica Floyd on 1/13/2022.   FOTO documents entered into [employer] - see Media section.     Limitation Score: 52%  Predicted Score: 58%          TREATMENT   Charges based on 1-1 tx:  Therapeutic exercises for  15 minutes:  Heel raises, B HRA, 3x10   Standing hip abd, R/L, anne band, 3x10 repetitions  Standing hip extension, anne band, 3x10 repetitions  Seated hip IR with anne band at ankles, R/L 3x10     Bridges 3x10 repetitions, 3" hold up  Hip adduction ball squeeze 30x 5"   Clam shell in R/L SL with anne band, 3x10  SLR 3x10 R/L  Seated orange band pull apart overhead, airex mat in chair, 3x10    Neuromuscular " "reeducation for 38 minutes:  Gait belt donned for all balance exercises  Alt LE marches 2 laps x40 feet each   Weight shifting - ball/ring toss, cone stepping various directions with response to cues   Step ups on 6" step with one HR CGA 3x10 reps   Step downs on 6" step with one HR CGA 3x10 reps  NBOS on airex foam EC 3x30" ea  Marching on airex form 3x60" - fingertip support prn  Sharpened Rhomberg stance 2x30" ea alt LE    Forward and side stepping over agility cones  Bending down picking up cones and handing them to clinician  with high reach x12 cones  +Ej stepping x6 hurdles x4 - CGA  Standing on airex mat, catching/tossing ball, 4x5   Lateral steps 40 x 2 laps      Therapeutic activities for 00 minutes:  Sit <> stand and one foam cushion 3x10  Leg press machine: double limb -> single limb, from low point, 2 black cords     PATIENT EDUCATION AND HOME EXERCISES      Home Exercises Provided and Patient Education Provided      Education provided:   exercise form and rationale       Written Home Exercises Provided: Patient instructed to cont prior HEP. Exercises were reviewed and Jessica was able to demonstrate them prior to the end of the session.  Jessica demonstrated good  understanding of the education provided. See EMR under Patient Instructions for exercises provided during therapy sessions     ASSESSMENT   Pt was able to demonstrate slightly improved static balance this visit with slightly improved fear avoidance noted. Dynamic balance continues to be challenging with increased postural sway during exercises with reduced ASHLEY.                Jessica is making fair progress towards meeting set goals.   Pt prognosis is Fair.      Pt will continue to benefit from skilled outpatient physical therapy to address the deficits listed in the problem list box on initial evaluation, provide pt/family education and to maximize pt's level of independence in the home and community environment.      Pt's spiritual, " "cultural and educational needs considered and pt agreeable to plan of care and goals.     Anticipated barriers to physical therapy: fear avoidance      Goals:  Short Term Goals: 4 weeks   (1) patient will be I with HOME EXERCISE PROGRAM (MET, 1/11/22)  (2) patient will complete 13 sit - stand transfers within 30" to facilitate improved transitional tolerance throughout ADLs (MET, 2/16/22)  (3) patient will complete 25 heel raises, no HRA, to facilitate improved static balance when reaching into upper cabinets (ongoing, not met, 1/20/2022)      Long Term Goals: 8 weeks   (1) patient will participate in modified tandem stand with L LE in front for 30" with CGA to minimize risk of fall (MET, 2/16/22)  (2) patient will participate in modified Rhomberg stand, EC, firm surface for 25" with CGA to minimize risk of fall (met, 1/20/2022)   (3) patient will average >13.5" TUG trials to facilitate decreased risk of fall during community mobility tasks (progressing, not met, 1/20/2022)     Plan   Focus on improving balance/proprioception exercises with emphasis on ADL performance.     Updated Certification Period: 3/18/2022 to 5/16/22  Outpatient Physical Therapy 2 times weekly for 4 weeks to include the following interventions: Gait Training, Manual Therapy, Moist Heat/ Ice, Neuromuscular Re-ed, Patient Education, Self Care, Therapeutic Activities and Therapeutic Exercise.        Harsha Benitez, PTA  3/18/2022                                      "

## 2022-03-25 ENCOUNTER — CLINICAL SUPPORT (OUTPATIENT)
Dept: REHABILITATION | Facility: OTHER | Age: 72
End: 2022-03-25
Payer: MEDICARE

## 2022-03-25 DIAGNOSIS — R26.89 BALANCE PROBLEM: Primary | ICD-10-CM

## 2022-03-25 PROCEDURE — 97110 THERAPEUTIC EXERCISES: CPT | Mod: HCNC,PN,CQ

## 2022-03-25 PROCEDURE — 97112 NEUROMUSCULAR REEDUCATION: CPT | Mod: HCNC,PN,CQ

## 2022-03-25 NOTE — PROGRESS NOTES
"[facility]   Physical Therapy Treatment Note     Name: Jessica Floyd  Clinic Number: [id number]    Therapy Diagnosis:   Encounter Diagnosis   Name Primary?    Balance problem Yes     Physician: Niyah Sheffield PA-C    Physician Orders: PT Eval and Treat   Medical Diagnosis from Referral: R26.89 (ICD-10-CM) - Balance problem   Evaluation Date: 1/4/2022  Authorization Period Expiration: 4/1/22  Plan of Care Expiration: 5/16/22  Visit # / Visits authorized: 21/20  FOTO: #3/3 (52% on 1/13/2022)     Precautions: Standard and Fall      Prior Level of Function: mild limitation, caring for 4 year old grand child, walking often without cane      Time in: 0800  Time out: 0850  Total time: 40 minutes      SUBJECTIVE   States she must leave PT early today due to a schedule conflict. States she has been seeing improvement in her balance as she plays with her granddaughter.     She reports compliance with HOME EXERCISE PROGRAM.  Response to previous treatment: felt a bit tired but in a good way.  Function:  has noticed that she is negotiating stairs more confidently, continues with use of cane and still feels impacted by fear avoidance      Pain: 0/10  Location: Pt denies pain today     OBJECTIVE   2/16/22:  Observation: Significant fear avoidance (of falling) during assessment and treatment today.  Positional and transitional tolerance impacted by dizziness/shortness of breath. L visual field begins around 30 degrees from midline.     Posture: forward flexed at hips with center of gravity forward past base of support.     Gait: walks with cane as "security blanket" on curbs      Range of Motion: AROM:  Hip Left  Right    Flexion 116 109      Knee Left  Right    Extension 0 0   Flexion 132 142      Ankle Left  Right    Dorsiflexion 7 6   Plantarflexion 51 40      Strength:  Hip Left  Right    Flexion 4/5 4/5   Abduction 4/5 in sitting  4/5 in sitting    Adduction 4/5 in sitting  4/5 in sitting " "  Extension 3-  Difficulty clearing mat  4- with hip substitution movements       Knee Left  Right   Extension 4/5 4-/5      Ankle Left  Right    Dorsiflexion 4/5 4/5   Plantarflexion 25 heel raises with HRA  25 heel raises with HRA      Special Tests:    Left Right   Slump  Negative  Negative       Function:   Sit - stand:12 X in 30"   pushes with hands on chair      TUG:  Trial 1: 14" SBA  Trial 2: 14" SBA  Trial 3: 14" SBA  Average: 14"     Sensation: light touch sensation intact and equal bilaterally      Static Balance    Left  Right    SLS   Unsteadiness with Unilateral  HRA Unsteadiness with Unilateral HRA    Rhomberg stand, EO, firm surface  25", 30"CGA    Pt unable to let go of // bars completely       Rhomberg stand, EC, firm surface 7", 25" CGA, feet not completely toucing        Rhomberg srand, EO, unsteady surface 7", 18", 26" CGA        Rhomberg stand, EC, unsteady surface  Not appropriate to test at this time       Modified tandem stand    Unable to attempt - patient fearful and hesitant to let go of cane, also holding on to PT 30" CGA       Limitation/Restriction for FOTO Balance Survey     Therapist reviewed FOTO scores for Jessica Floyd on 1/13/2022.   FOTO documents entered into [employer] - see Media section.     Limitation Score: 52%  Predicted Score: 58%          TREATMENT   Charges based on 1-1 tx:  Therapeutic exercises for  15 minutes:    Heel raises, B HRA, 3x10   Standing hip abd, R/L, anne band, 3x10 repetitions  Standing hip extension, anne band, 3x10 repetitions  Seated hip IR with anne band at ankles, R/L 3x10 - out of time    Bridges 3x10 repetitions, 3" hold up  Hip adduction ball squeeze 30x 5"   Clam shell in R/L SL with anne band, 3x10  SLR 3x10 R/L  Seated orange band pull apart overhead, airex mat in chair, 3x10    Neuromuscular reeducation for 25 minutes:  Gait belt donned for all balance exercises  Alt LE marches 2 laps x40 feet each   Weight shifting - ball/ring toss, cone " "stepping various directions with response to cues   Step ups on 6" step with one HR CGA 3x10 reps   Step downs on 6" step with one HR CGA 3x10 reps  NBOS on airex foam EC 3x30" ea  Marching on airex form 3x60" - fingertip support prn  Sharpened Rhomberg stance 2x30" ea alt LE  - out of time  Forward and side stepping over agility cones  Bending down picking up cones and handing them to clinician  with high reach x12 cones  +Ej stepping x6 hurdles x4 - CGA  Standing on airex mat, catching/tossing ball, 4x5   Lateral steps 40 x 2 laps      Therapeutic activities for 00 minutes:  Sit <> stand and one foam cushion 3x10  Leg press machine: double limb -> single limb, from low point, 2 black cords     PATIENT EDUCATION AND HOME EXERCISES      Home Exercises Provided and Patient Education Provided      Education provided:   exercise form and rationale       Written Home Exercises Provided: Patient instructed to cont prior HEP. Exercises were reviewed and Jessica was able to demonstrate them prior to the end of the session.  Jessica demonstrated good  understanding of the education provided. See EMR under Patient Instructions for exercises provided during therapy sessions     ASSESSMENT   Pt was able to demonstrate improved balance during functional bend/reach exercise. Decreased postural sway was noted during NBOS exercises as fear avoidance appears to be improving. Emphasis on improving dynamic balance and decreasing postural stiffness when challenged.             Jessica is making fair progress towards meeting set goals.   Pt prognosis is Fair.      Pt will continue to benefit from skilled outpatient physical therapy to address the deficits listed in the problem list box on initial evaluation, provide pt/family education and to maximize pt's level of independence in the home and community environment.      Pt's spiritual, cultural and educational needs considered and pt agreeable to plan of care and goals.   " "  Anticipated barriers to physical therapy: fear avoidance      Goals:  Short Term Goals: 4 weeks   (1) patient will be I with HOME EXERCISE PROGRAM (MET, 1/11/22)  (2) patient will complete 13 sit - stand transfers within 30" to facilitate improved transitional tolerance throughout ADLs (MET, 2/16/22)  (3) patient will complete 25 heel raises, no HRA, to facilitate improved static balance when reaching into upper cabinets (ongoing, not met, 1/20/2022)      Long Term Goals: 8 weeks   (1) patient will participate in modified tandem stand with L LE in front for 30" with CGA to minimize risk of fall (MET, 2/16/22)  (2) patient will participate in modified Rhomberg stand, EC, firm surface for 25" with CGA to minimize risk of fall (met, 1/20/2022)   (3) patient will average >13.5" TUG trials to facilitate decreased risk of fall during community mobility tasks (progressing, not met, 1/20/2022)     Plan   Focus on improving balance/proprioception exercises with emphasis on ADL performance.     Updated Certification Period: 3/25/2022 to 5/16/22  Outpatient Physical Therapy 2 times weekly for 4 weeks to include the following interventions: Gait Training, Manual Therapy, Moist Heat/ Ice, Neuromuscular Re-ed, Patient Education, Self Care, Therapeutic Activities and Therapeutic Exercise.        Harsha Benitez, PTA  3/25/2022                                        "

## 2022-03-28 RX ORDER — CARVEDILOL 12.5 MG/1
TABLET ORAL
Qty: 180 TABLET | Refills: 0 | Status: SHIPPED | OUTPATIENT
Start: 2022-03-28 | End: 2022-05-13

## 2022-03-28 NOTE — TELEPHONE ENCOUNTER
This Rx Request does not qualify for assessment with the [facility]   Please review protocol details and the Care Due Message for extra clinical information    Reasons Rx Request may be deferred:  Labs/Vitals abnormal  Pt due for OV with PCP    Note composed:2:55 PM 03/28/2022

## 2022-03-28 NOTE — TELEPHONE ENCOUNTER
Care Due:                  Date            Visit Type   Department     Provider  --------------------------------------------------------------------------------                                VIRTUAL      [facility] PRIMARY  Last Visit: 01-      AUDIO ONLY   CARE           Effie Olmedo  Next Visit: None Scheduled  None         None Found                                                            Last  Test          Frequency    Reason                     Performed    Due Date  --------------------------------------------------------------------------------    Office Visit  12 months..  atorvastatin.............  01- 12-    CMP.........  12 months..  atorvastatin.............  04-   04-    Lipid Panel.  12 months..  atorvastatin.............  04-   04-    Powered by [employer]. Reference number: [id number].   3/27/2022 8:12:51 PM CDT

## 2022-03-30 ENCOUNTER — CLINICAL SUPPORT (OUTPATIENT)
Dept: REHABILITATION | Facility: OTHER | Age: 72
End: 2022-03-30
Payer: MEDICARE

## 2022-03-30 DIAGNOSIS — R26.89 BALANCE PROBLEM: Primary | ICD-10-CM

## 2022-03-30 PROCEDURE — 97112 NEUROMUSCULAR REEDUCATION: CPT | Mod: PN

## 2022-03-30 PROCEDURE — 97110 THERAPEUTIC EXERCISES: CPT | Mod: PN

## 2022-03-30 NOTE — PROGRESS NOTES
[facility]   Physical Therapy Updated Plan of Care    Name: Jessica Floyd  Clinic Number: [id number]    Therapy Diagnosis:   Encounter Diagnosis   Name Primary?    Balance problem Yes     Physician: Niyah Sheffield PA-C    Physician Orders: PT Eval and Treat   Medical Diagnosis from Referral: R26.89 (ICD-10-CM) - Balance problem   Evaluation Date: 1/4/2022  Authorization Period Expiration: 4/1/22  Plan of Care Expiration: 5/16/22  Visit # / Visits authorized: 21/20  FOTO: #3/3 (52% on 1/13/2022)     Precautions: Standard and Fall      Prior Level of Function: mild limitation, caring for 4 year old grand child, walking often without cane      Time in: 0800  Time out: 0850  Total time: 40 minutes      SUBJECTIVE   Pt is doing well today. Pt states she is more confident with her balance while ambulating at home.     She reports compliance with HOME EXERCISE PROGRAM.  Response to previous treatment: felt a bit tired but in a good way.  Function:  has noticed that she is negotiating stairs more confidently, continues with use of cane and still feels impacted by fear avoidance      Pain: 0/10  Location: Pt denies pain today     OBJECTIVE   3/30/22:    Posture: forward flexed at hips with center of gravity forward past base of support.     Gait: walks with single point cane     Range of Motion: AROM:  Hip Left  Right    Flexion 116 109      Knee Left  Right    Extension 0 0   Flexion 132 142      Ankle Left  Right    Dorsiflexion 7 6   Plantarflexion 51 40      Strength:  Hip Left  Right    Flexion 4/5 4/5   Abduction 4/5 in sitting  4/5 in sitting    Adduction 4/5 in sitting  4/5 in sitting   Extension 3-  Difficulty clearing mat  4- with hip substitution movements       Knee Left  Right   Extension 4/5 4-/5      Ankle Left  Right    Dorsiflexion 4/5 4/5   Plantarflexion 25 heel raises with HRA  25 heel raises with HRA      Special Tests:    Left Right   Slump  Negative  Negative      "  Function:   Sit - stand:7 X in 30"   pushes with hands on chair      TUG:  Trial 1: 14" SBA  Trial 2: 13" SBA  Trial 3: 13" SBA  Average: 13.5"     Sensation: light touch sensation intact and equal bilaterally      Static Balance    Left  Right    SLS   Unsteadiness with Unilateral  HRA Unsteadiness with Unilateral HRA    Rhomberg stand, EO, firm surface  25", 30"CGA    Pt unable to let go of // bars completely       Rhomberg stand, EC, firm surface 7", 25" CGA, feet not completely toucing        Rhomberg srand, EO, unsteady surface 7", 18", 26" CGA        Rhomberg stand, EC, unsteady surface  Not appropriate to test at this time       Modified tandem stand    Unable to attempt - patient fearful and hesitant to let go of cane, also holding on to PT 30" CGA       Limitation/Restriction for FOTO Balance Survey     Therapist reviewed FOTO scores for Jessica Floyd on FOTO Discharged  FOTO documents entered into [employer] - see Media section.     Limitation Score: 52%  Predicted Score: 58%          TREATMENT   Charges based on 1-1 tx:  Therapeutic exercises for  15 minutes:    Heel raises, B HRA, 3x10   Standing hip abd, R/L, anne band, 3x10 repetitions  Standing hip extension, anne band, 3x10 repetitions  Updated POC    Bridges 3x10 repetitions, 3" hold up  Hip adduction ball squeeze 30x 5"   Clam shell in R/L SL with anne band, 3x10  SLR 3x10 R/L  Seated orange band pull apart overhead, airex mat in chair, 3x10    Neuromuscular reeducation for 25 minutes:  Gait belt donned for all balance exercises  Alt LE marches 2 laps x40 feet each   Weight shifting - ball/ring toss, cone stepping various directions with response to cues   Step ups on 6" step with one HR CGA 3x10 reps   Step downs on 6" step with one HR CGA 3x10 reps  NBOS on airex foam EC 3x30" ea  Marching on airex form 3x60" - fingertip support prn  Sharpened Rhomberg stance 2x30" ea alt LE  - out of time  Forward and side stepping over agility cones  Bending down " "picking up cones and handing them to clinician  with high reach x12 cones  +Ej stepping x6 hurdles x4 - CGA  Standing on airex mat, catching/tossing ball, 4x5   Lateral steps 40 x 2 laps      Therapeutic activities for 00 minutes:  Sit <> stand and one foam cushion 3x10  Leg press machine: double limb -> single limb, from low point, 2 black cords     PATIENT EDUCATION AND HOME EXERCISES      Home Exercises Provided and Patient Education Provided      Education provided:   exercise form and rationale       Written Home Exercises Provided: Patient instructed to cont prior HEP. Exercises were reviewed and Jessica was able to demonstrate them prior to the end of the session.  Jessica demonstrated good  understanding of the education provided. See EMR under Patient Instructions for exercises provided during therapy sessions     ASSESSMENT   Pt tolerated tx session well today. Pt's balance and strength measurements are the same as her last reassessment performed. Plan to discharge pt with HEP next visit.             Jessica is making fair progress towards meeting set goals.   Pt prognosis is Fair.      Pt will continue to benefit from skilled outpatient physical therapy to address the deficits listed in the problem list box on initial evaluation, provide pt/family education and to maximize pt's level of independence in the home and community environment.      Pt's spiritual, cultural and educational needs considered and pt agreeable to plan of care and goals.     Anticipated barriers to physical therapy: fear avoidance      Goals:  Short Term Goals: 4 weeks   (1) patient will be I with HOME EXERCISE PROGRAM (MET, 1/11/22)  (2) patient will complete 13 sit - stand transfers within 30" to facilitate improved transitional tolerance throughout ADLs (MET, 2/16/22)  (3) patient will complete 25 heel raises, no HRA, to facilitate improved static balance when reaching into upper cabinets (ongoing, not met, 3/30/22)     Long Term " "Goals: 8 weeks   (1) patient will participate in modified tandem stand with L LE in front for 30" with CGA to minimize risk of fall (MET, 2/16/22)  (2) patient will participate in modified Rhomberg stand, EC, firm surface for 25" with CGA to minimize risk of fall (met, 1/20/2022)   (3) patient will average >13.5" TUG trials to facilitate decreased risk of fall during community mobility tasks (MET, 3/30/22)    Plan       Updated Certification Period: 3/30/2022 to 5/16/22  Outpatient Physical Therapy one more visit this week.     Shazia Elkins, PT  3/30/2022                                          "

## 2022-03-30 NOTE — PLAN OF CARE
[facility]   Physical Therapy Updated Plan of Care    Name: Jessica Floyd  Clinic Number: [id number]    Therapy Diagnosis:   Encounter Diagnosis   Name Primary?    Balance problem Yes     Physician: Niyah Sheffield PA-C    Physician Orders: PT Eval and Treat   Medical Diagnosis from Referral: R26.89 (ICD-10-CM) - Balance problem   Evaluation Date: 1/4/2022  Authorization Period Expiration: 4/1/22  Plan of Care Expiration: 5/16/22  Visit # / Visits authorized: 21/20  FOTO: #3/3 (52% on 1/13/2022)     Precautions: Standard and Fall      Prior Level of Function: mild limitation, caring for 4 year old grand child, walking often without cane      Time in: 0800  Time out: 0850  Total time: 40 minutes      SUBJECTIVE   Pt is doing well today. Pt states she is more confident with her balance while ambulating at home.     She reports compliance with HOME EXERCISE PROGRAM.  Response to previous treatment: felt a bit tired but in a good way.  Function:  has noticed that she is negotiating stairs more confidently, continues with use of cane and still feels impacted by fear avoidance      Pain: 0/10  Location: Pt denies pain today     OBJECTIVE   3/30/22:    Posture: forward flexed at hips with center of gravity forward past base of support.     Gait: walks with single point cane     Range of Motion: AROM:  Hip Left  Right    Flexion 116 109      Knee Left  Right    Extension 0 0   Flexion 132 142      Ankle Left  Right    Dorsiflexion 7 6   Plantarflexion 51 40      Strength:  Hip Left  Right    Flexion 4/5 4/5   Abduction 4/5 in sitting  4/5 in sitting    Adduction 4/5 in sitting  4/5 in sitting   Extension 3-  Difficulty clearing mat  4- with hip substitution movements       Knee Left  Right   Extension 4/5 4-/5      Ankle Left  Right    Dorsiflexion 4/5 4/5   Plantarflexion 25 heel raises with HRA  25 heel raises with HRA      Special Tests:    Left Right   Slump  Negative  Negative      "  Function:   Sit - stand:7 X in 30"   pushes with hands on chair      TUG:  Trial 1: 14" SBA  Trial 2: 13" SBA  Trial 3: 13" SBA  Average: 13.5"     Sensation: light touch sensation intact and equal bilaterally      Static Balance    Left  Right    SLS   Unsteadiness with Unilateral  HRA Unsteadiness with Unilateral HRA    Rhomberg stand, EO, firm surface  25", 30"CGA    Pt unable to let go of // bars completely       Rhomberg stand, EC, firm surface 7", 25" CGA, feet not completely toucing        Rhomberg srand, EO, unsteady surface 7", 18", 26" CGA        Rhomberg stand, EC, unsteady surface  Not appropriate to test at this time       Modified tandem stand    Unable to attempt - patient fearful and hesitant to let go of cane, also holding on to PT 30" CGA       Limitation/Restriction for FOTO Balance Survey     Therapist reviewed FOTO scores for Jessica Floyd on FOTO Discharged  FOTO documents entered into [employer] - see Media section.     Limitation Score: 52%  Predicted Score: 58%          TREATMENT   Charges based on 1-1 tx:  Therapeutic exercises for  30 minutes:    Heel raises, B HRA, 3x10   Standing hip abd, R/L, anne band, 3x10 repetitions  Standing hip extension, anne band, 3x10 repetitions  Updated POC    Bridges 3x10 repetitions, 3" hold up  Hip adduction ball squeeze 30x 5"   Clam shell in R/L SL with anne band, 3x10  SLR 3x10 R/L  Seated orange band pull apart overhead, airex mat in chair, 3x10    Neuromuscular reeducation for 30 minutes:  Gait belt donned for all balance exercises  Alt LE marches 2 laps x40 feet each   Weight shifting - ball/ring toss, cone stepping various directions with response to cues   Step ups on 6" step with one HR CGA 3x10 reps   Step downs on 6" step with one HR CGA 3x10 reps  NBOS on airex foam EC 3x30" ea  Marching on airex form 3x60" - fingertip support prn  Sharpened Rhomberg stance 2x30" ea alt LE  - out of time  Forward and side stepping over agility cones  Bending down " "picking up cones and handing them to clinician  with high reach x12 cones  +Ej stepping x6 hurdles x4 - CGA  Standing on airex mat, catching/tossing ball, 4x5   Lateral steps 40 x 2 laps      Therapeutic activities for 00 minutes:  Sit <> stand and one foam cushion 3x10  Leg press machine: double limb -> single limb, from low point, 2 black cords     PATIENT EDUCATION AND HOME EXERCISES      Home Exercises Provided and Patient Education Provided      Education provided:   exercise form and rationale       Written Home Exercises Provided: Patient instructed to cont prior HEP. Exercises were reviewed and Jessica was able to demonstrate them prior to the end of the session.  Jessica demonstrated good  understanding of the education provided. See EMR under Patient Instructions for exercises provided during therapy sessions     ASSESSMENT   Pt tolerated tx session well today. Pt's balance and strength measurements are the same as her last reassessment performed. Plan to discharge pt with HEP next visit.             Jesisca is making fair progress towards meeting set goals.   Pt prognosis is Fair.      Pt will continue to benefit from skilled outpatient physical therapy to address the deficits listed in the problem list box on initial evaluation, provide pt/family education and to maximize pt's level of independence in the home and community environment.      Pt's spiritual, cultural and educational needs considered and pt agreeable to plan of care and goals.     Anticipated barriers to physical therapy: fear avoidance      Goals:  Short Term Goals: 4 weeks   (1) patient will be I with HOME EXERCISE PROGRAM (MET, 1/11/22)  (2) patient will complete 13 sit - stand transfers within 30" to facilitate improved transitional tolerance throughout ADLs (MET, 2/16/22)  (3) patient will complete 25 heel raises, no HRA, to facilitate improved static balance when reaching into upper cabinets (ongoing, not met, 3/30/22)     Long Term " "Goals: 8 weeks   (1) patient will participate in modified tandem stand with L LE in front for 30" with CGA to minimize risk of fall (MET, 2/16/22)  (2) patient will participate in modified Rhomberg stand, EC, firm surface for 25" with CGA to minimize risk of fall (met, 1/20/2022)   (3) patient will average >13.5" TUG trials to facilitate decreased risk of fall during community mobility tasks (MET, 3/30/22)    Plan       Updated Certification Period: 3/30/2022 to 5/16/22  Outpatient Physical Therapy one more visit this week.     Shazia Elkins, PT  3/30/2022    "

## 2022-04-01 ENCOUNTER — CLINICAL SUPPORT (OUTPATIENT)
Dept: REHABILITATION | Facility: OTHER | Age: 72
End: 2022-04-01
Payer: MEDICARE

## 2022-04-01 DIAGNOSIS — R26.89 BALANCE PROBLEM: Primary | ICD-10-CM

## 2022-04-01 PROCEDURE — 97110 THERAPEUTIC EXERCISES: CPT | Mod: PN,CQ

## 2022-04-01 PROCEDURE — 97112 NEUROMUSCULAR REEDUCATION: CPT | Mod: PN,CQ

## 2022-04-01 NOTE — PROGRESS NOTES
[facility]   Physical Therapy Daily Treatment Note    Name: Jessica Floyd  Clinic Number: [id number]    Therapy Diagnosis:   Encounter Diagnosis   Name Primary?    Balance problem Yes     Physician: Niyah Sheffield PA-C    Physician Orders: PT Eval and Treat   Medical Diagnosis from Referral: R26.89 (ICD-10-CM) - Balance problem   Evaluation Date: 1/4/2022  Authorization Period Expiration: 4/1/22  Plan of Care Expiration: 5/16/22  Visit # / Visits authorized: 22/20  FOTO: #3/3 (52% on 1/13/2022)     Precautions: Standard and Fall      Prior Level of Function: mild limitation, caring for 4 year old grand child, walking often without cane      Time in: 0800  Time out: 0853  Total time: 53 minutes      SUBJECTIVE   She reports compliance with HOME EXERCISE PROGRAM.  Response to previous treatment: feels like her balance has improved as she is now able to walk on the gras without the fear of falling.   Function:  has noticed that she is negotiating stairs more confidently, continues with use of cane and still feels impacted by fear avoidance      Pain: 0/10  Location: Pt denies pain today     OBJECTIVE   3/30/22:    Posture: forward flexed at hips with center of gravity forward past base of support.     Gait: walks with single point cane     Range of Motion: AROM:  Hip Left  Right    Flexion 116 109      Knee Left  Right    Extension 0 0   Flexion 132 142      Ankle Left  Right    Dorsiflexion 7 6   Plantarflexion 51 40      Strength:  Hip Left  Right    Flexion 4/5 4/5   Abduction 4/5 in sitting  4/5 in sitting    Adduction 4/5 in sitting  4/5 in sitting   Extension 3-  Difficulty clearing mat  4- with hip substitution movements       Knee Left  Right   Extension 4/5 4-/5      Ankle Left  Right    Dorsiflexion 4/5 4/5   Plantarflexion 25 heel raises with HRA  25 heel raises with HRA      Special Tests:    Left Right   Slump  Negative  Negative       Function:   Sit - stand:7 X in  "30"   pushes with hands on chair      TUG:  Trial 1: 14" SBA  Trial 2: 13" SBA  Trial 3: 13" SBA  Average: 13.5"     Sensation: light touch sensation intact and equal bilaterally      Static Balance    Left  Right    SLS   Unsteadiness with Unilateral  HRA Unsteadiness with Unilateral HRA    Rhomberg stand, EO, firm surface  25", 30"CGA    Pt unable to let go of // bars completely       Rhomberg stand, EC, firm surface 7", 25" CGA, feet not completely toucing        Rhomberg srand, EO, unsteady surface 7", 18", 26" CGA        Rhomberg stand, EC, unsteady surface  Not appropriate to test at this time       Modified tandem stand    Unable to attempt - patient fearful and hesitant to let go of cane, also holding on to PT 30" CGA       Limitation/Restriction for FOTO Balance Survey     Therapist reviewed FOTO scores for Jessica Floyd on FOTO Discharged  FOTO documents entered into [employer] - see Media section.     Limitation Score: 52%  Predicted Score: 58%          TREATMENT   Charges based on 1-1 tx:  Therapeutic exercises for  30 minutes:    Heel raises, B HRA, 3x10   Standing hip abd, R/L, anne band, 3x10 repetitions  Standing hip extension, anne band, 3x10 repetitions  Updated HEP/pt education x10 min    Bridges 3x10 repetitions, 3" hold up  Hip adduction ball squeeze 30x 5"   Clam shell in R/L SL with anne band, 3x10  SLR 3x10 R/L  Seated orange band pull apart overhead, airex mat in chair, 3x10    Neuromuscular reeducation for 23 minutes:  Gait belt donned for all balance exercises  Alt LE marches 2 laps x40 feet each   Weight shifting - ball/ring toss, cone stepping various directions with response to cues   Step ups on 6" step with one HR CGA 3x10 reps   Step downs on 6" step with one HR CGA 3x10 reps  NBOS on airex foam EC 3x30" ea  Marching on airex form 3x60" - fingertip support prn  Sharpened Rhomberg stance 2x30" ea alt LE  - out of time  Forward and side stepping over agility cones  Bending down picking up " cones and handing them to clinician  with high reach x12 cones  +Ej stepping x6 hurdles x4 - CGA  Standing on airex mat, catching/tossing ball, 4x5   Lateral steps 40 x 2 laps      Therapeutic activities for 00 minutes:  Sit <> stand and one foam cushion 3x10  Leg press machine: double limb -> single limb, from low point, 2 black cords     PATIENT EDUCATION AND HOME EXERCISES      Home Exercises Provided and Patient Education Provided      Education provided:   exercise form and rationale       Written Home Exercises Provided: Patient instructed to cont prior HEP. Exercises were reviewed and Jessica was able to demonstrate them prior to the end of the session.  Jessica demonstrated good  understanding of the education provided. See EMR under Patient Instructions for exercises provided during therapy sessions     ASSESSMENT     Good tolerance with there-ex this visit. Improved endurance during resistive exercises. Continued with balance / proprioception exercises for improved postural control and dynamic stability . Pt has made progress with overcoming fear during ADLs/ambulation as well as shown improved balance with NBOS. Pt received updated HEP with return demo for understanding. Pt is to be D/C from PT to HEP at this time per Shazia Elkins DPT. PT/PTA face to face conference concerning pt status/TX.           Jessica is making fair progress towards meeting set goals.   Pt prognosis is Fair.      Pt will continue to benefit from skilled outpatient physical therapy to address the deficits listed in the problem list box on initial evaluation, provide pt/family education and to maximize pt's level of independence in the home and community environment.      Pt's spiritual, cultural and educational needs considered and pt agreeable to plan of care and goals.     Anticipated barriers to physical therapy: fear avoidance      Goals:  Short Term Goals: 4 weeks   (1) patient will be I with HOME EXERCISE PROGRAM (MET,  "1/11/22)  (2) patient will complete 13 sit - stand transfers within 30" to facilitate improved transitional tolerance throughout ADLs (MET, 2/16/22)  (3) patient will complete 25 heel raises, no HRA, to facilitate improved static balance when reaching into upper cabinets (ongoing, not met, 3/30/22)     Long Term Goals: 8 weeks   (1) patient will participate in modified tandem stand with L LE in front for 30" with CGA to minimize risk of fall (MET, 2/16/22)  (2) patient will participate in modified Rhomberg stand, EC, firm surface for 25" with CGA to minimize risk of fall (met, 1/20/2022)   (3) patient will average >13.5" TUG trials to facilitate decreased risk of fall during community mobility tasks (MET, 3/30/22)    Plan       Updated Certification Period: 4/1/2022 to 5/16/22    D/C from PT per supervising PT guidance      Harsha Benitez, PTA  4/1/2022                                            "

## 2022-04-05 ENCOUNTER — LAB VISIT (OUTPATIENT)
Dept: LAB | Facility: OTHER | Age: 72
End: 2022-04-05
Attending: INTERNAL MEDICINE
Payer: MEDICARE

## 2022-04-05 DIAGNOSIS — E78.00 HYPERCHOLESTEROLEMIA: ICD-10-CM

## 2022-04-05 LAB
ALBUMIN SERPL BCP-MCNC: 3.8 G/DL (ref 3.5–5.2)
ALP SERPL-CCNC: 69 U/L (ref 55–135)
ALT SERPL W/O P-5'-P-CCNC: 20 U/L (ref 10–44)
ANION GAP SERPL CALC-SCNC: 11 MMOL/L (ref 8–16)
AST SERPL-CCNC: 27 U/L (ref 10–40)
BILIRUB SERPL-MCNC: 0.7 MG/DL (ref 0.1–1)
BUN SERPL-MCNC: 19 MG/DL (ref 8–23)
CALCIUM SERPL-MCNC: 9.7 MG/DL (ref 8.7–10.5)
CHLORIDE SERPL-SCNC: 95 MMOL/L (ref 95–110)
CHOLEST SERPL-MCNC: 176 MG/DL (ref 120–199)
CHOLEST/HDLC SERPL: 2.6 {RATIO} (ref 2–5)
CO2 SERPL-SCNC: 31 MMOL/L (ref 23–29)
CREAT SERPL-MCNC: 1.1 MG/DL (ref 0.5–1.4)
EST. GFR  (AFRICAN AMERICAN): 58 ML/MIN/1.73 M^2
EST. GFR  (NON AFRICAN AMERICAN): 50 ML/MIN/1.73 M^2
GLUCOSE SERPL-MCNC: 90 MG/DL (ref 70–110)
HDLC SERPL-MCNC: 69 MG/DL (ref 40–75)
HDLC SERPL: 39.2 % (ref 20–50)
LDLC SERPL CALC-MCNC: 89.4 MG/DL (ref 63–159)
NONHDLC SERPL-MCNC: 107 MG/DL
POTASSIUM SERPL-SCNC: 3.2 MMOL/L (ref 3.5–5.1)
PROT SERPL-MCNC: 7.2 G/DL (ref 6–8.4)
SODIUM SERPL-SCNC: 137 MMOL/L (ref 136–145)
TRIGL SERPL-MCNC: 88 MG/DL (ref 30–150)

## 2022-04-05 PROCEDURE — 80061 LIPID PANEL: CPT | Performed by: INTERNAL MEDICINE

## 2022-04-05 PROCEDURE — 80053 COMPREHEN METABOLIC PANEL: CPT | Performed by: INTERNAL MEDICINE

## 2022-04-05 PROCEDURE — 36415 COLL VENOUS BLD VENIPUNCTURE: CPT | Performed by: INTERNAL MEDICINE

## 2022-04-12 ENCOUNTER — OFFICE VISIT (OUTPATIENT)
Dept: CARDIOLOGY | Facility: CLINIC | Age: 72
End: 2022-04-12
Payer: MEDICARE

## 2022-04-12 VITALS
DIASTOLIC BLOOD PRESSURE: 78 MMHG | BODY MASS INDEX: 24.49 KG/M2 | HEART RATE: 78 BPM | WEIGHT: 142.69 LBS | OXYGEN SATURATION: 98 % | SYSTOLIC BLOOD PRESSURE: 132 MMHG

## 2022-04-12 DIAGNOSIS — I25.10 ATHEROSCLEROSIS OF NATIVE CORONARY ARTERY OF NATIVE HEART WITHOUT ANGINA PECTORIS: ICD-10-CM

## 2022-04-12 DIAGNOSIS — N18.31 STAGE 3A CHRONIC KIDNEY DISEASE: ICD-10-CM

## 2022-04-12 DIAGNOSIS — I65.21 STENOSIS OF RIGHT CAROTID ARTERY: Primary | ICD-10-CM

## 2022-04-12 DIAGNOSIS — I10 ESSENTIAL HYPERTENSION: ICD-10-CM

## 2022-04-12 DIAGNOSIS — E78.00 HYPERCHOLESTEROLEMIA: ICD-10-CM

## 2022-04-12 PROCEDURE — 99999 PR PBB SHADOW E&M-EST. PATIENT-LVL III: CPT | Mod: PBBFAC,,, | Performed by: INTERNAL MEDICINE

## 2022-04-12 PROCEDURE — 1126F AMNT PAIN NOTED NONE PRSNT: CPT | Mod: CPTII,S$GLB,, | Performed by: INTERNAL MEDICINE

## 2022-04-12 PROCEDURE — 3078F DIAST BP <80 MM HG: CPT | Mod: CPTII,S$GLB,, | Performed by: INTERNAL MEDICINE

## 2022-04-12 PROCEDURE — 99214 PR OFFICE/OUTPT VISIT, EST, LEVL IV, 30-39 MIN: ICD-10-PCS | Mod: S$GLB,,, | Performed by: INTERNAL MEDICINE

## 2022-04-12 PROCEDURE — 99214 OFFICE O/P EST MOD 30 MIN: CPT | Mod: S$GLB,,, | Performed by: INTERNAL MEDICINE

## 2022-04-12 PROCEDURE — 3008F PR BODY MASS INDEX (BMI) DOCUMENTED: ICD-10-PCS | Mod: CPTII,S$GLB,, | Performed by: INTERNAL MEDICINE

## 2022-04-12 PROCEDURE — 3288F FALL RISK ASSESSMENT DOCD: CPT | Mod: CPTII,S$GLB,, | Performed by: INTERNAL MEDICINE

## 2022-04-12 PROCEDURE — 1159F MED LIST DOCD IN RCRD: CPT | Mod: CPTII,S$GLB,, | Performed by: INTERNAL MEDICINE

## 2022-04-12 PROCEDURE — 3075F PR MOST RECENT SYSTOLIC BLOOD PRESS GE 130-139MM HG: ICD-10-PCS | Mod: CPTII,S$GLB,, | Performed by: INTERNAL MEDICINE

## 2022-04-12 PROCEDURE — 99999 PR PBB SHADOW E&M-EST. PATIENT-LVL III: ICD-10-PCS | Mod: PBBFAC,,, | Performed by: INTERNAL MEDICINE

## 2022-04-12 PROCEDURE — 1126F PR PAIN SEVERITY QUANTIFIED, NO PAIN PRESENT: ICD-10-PCS | Mod: CPTII,S$GLB,, | Performed by: INTERNAL MEDICINE

## 2022-04-12 PROCEDURE — 1101F PR PT FALLS ASSESS DOC 0-1 FALLS W/OUT INJ PAST YR: ICD-10-PCS | Mod: CPTII,S$GLB,, | Performed by: INTERNAL MEDICINE

## 2022-04-12 PROCEDURE — 3008F BODY MASS INDEX DOCD: CPT | Mod: CPTII,S$GLB,, | Performed by: INTERNAL MEDICINE

## 2022-04-12 PROCEDURE — 3078F PR MOST RECENT DIASTOLIC BLOOD PRESSURE < 80 MM HG: ICD-10-PCS | Mod: CPTII,S$GLB,, | Performed by: INTERNAL MEDICINE

## 2022-04-12 PROCEDURE — 1159F PR MEDICATION LIST DOCUMENTED IN MEDICAL RECORD: ICD-10-PCS | Mod: CPTII,S$GLB,, | Performed by: INTERNAL MEDICINE

## 2022-04-12 PROCEDURE — 1160F PR REVIEW ALL MEDS BY PRESCRIBER/CLIN PHARMACIST DOCUMENTED: ICD-10-PCS | Mod: CPTII,S$GLB,, | Performed by: INTERNAL MEDICINE

## 2022-04-12 PROCEDURE — 3075F SYST BP GE 130 - 139MM HG: CPT | Mod: CPTII,S$GLB,, | Performed by: INTERNAL MEDICINE

## 2022-04-12 PROCEDURE — 1101F PT FALLS ASSESS-DOCD LE1/YR: CPT | Mod: CPTII,S$GLB,, | Performed by: INTERNAL MEDICINE

## 2022-04-12 PROCEDURE — 3288F PR FALLS RISK ASSESSMENT DOCUMENTED: ICD-10-PCS | Mod: CPTII,S$GLB,, | Performed by: INTERNAL MEDICINE

## 2022-04-12 PROCEDURE — 1160F RVW MEDS BY RX/DR IN RCRD: CPT | Mod: CPTII,S$GLB,, | Performed by: INTERNAL MEDICINE

## 2022-04-12 NOTE — PROGRESS NOTES
[facility]    Chief Complaint  Chief Complaint   Patient presents with    Carotid Artery Disease       HPI:    Generally doing well.  Going to physical therapy for balance training.  No falls.  Just finished a lot of yd work creating an 80 ft flower bed.  No problems with exertional dyspnea or chest discomfort.    Medications  Current Outpatient Medications   Medication Sig Dispense Refill    carvediloL (COREG) 12.5 MG tablet TAKE 1 TABLET BY MOUTH TWICE A DAY WITH MEALS 180 tablet 0    amLODIPine (NORVASC) 10 MG tablet TAKE 1 TABLET BY MOUTH EVERY DAY 90 tablet 3    aspirin (ECOTRIN) 81 MG EC tablet Take 1 tablet (81 mg total) by mouth once daily.  0    atorvastatin (LIPITOR) 80 MG tablet TAKE 1 TABLET BY MOUTH EVERY DAY 90 tablet 1    fluticasone propionate (FLONASE) 50 mcg/actuation nasal spray SPRAY ONCE INTO EACH NOSTRIL ONCE DAILY 16 mL 3    hydroCHLOROthiazide (HYDRODIURIL) 25 MG tablet TAKE 1 TABLET BY MOUTH EVERY DAY 90 tablet 3    hydrocortisone 2.5 % ointment as needed.      montelukast (SINGULAIR) 10 mg tablet TAKE 1 TABLET BY MOUTH EVERY DAY IN THE EVENING 30 tablet 0    pantoprazole (PROTONIX) 40 MG tablet Take 1 tablet (40 mg total) by mouth once daily. 90 tablet 3     No current facility-administered medications for this visit.        History  Past Medical History:   Diagnosis Date    Allergic rhinitis     Carotid stenosis     Coronary atherosclerosis     [facility]: 20% mLAD, 50% mCx, 20%, mRCA    Dyslipidemia     ESBL (extended spectrum beta-lactamase) producing bacteria infection     UTI    Hypertension     Nausea and vomiting 05/26/2017    Subarachnoid hemorrhage due to ruptured aneurysm 1999     Past Surgical History:   Procedure Laterality Date    ANTERIOR CRUCIATE LIGAMENT REPAIR  2012    CEREBRAL ANEURYSM REPAIR  1999    clip    DENTAL SURGERY       Social History     Socioeconomic History    Marital status:    Occupational History     Occupation: Retired   Tobacco Use    Smoking status: Former Smoker     Quit date: 1999     Years since quittin.2    Smokeless tobacco: Never Used   Substance and Sexual Activity    Alcohol use: Yes     Alcohol/week: 7.0 standard drinks     Types: 7 Glasses of wine per week    Drug use: No    Sexual activity: Yes     Partners: Male   Social History Narrative    .  Has 3 grown daughters.   lives in [facility].       Family History   Problem Relation Age of Onset    Aneurysm Mother     Alcohol abuse Father     Heart disease Brother         Allergies  Review of patient's allergies indicates:  No Known Allergies    Review of Systems   Review of Systems   Constitutional: Negative for malaise/fatigue, weight gain and weight loss.   Eyes: Negative for visual disturbance.   Cardiovascular: Negative for chest pain, claudication, cyanosis, dyspnea on exertion, irregular heartbeat, leg swelling, near-syncope, orthopnea, palpitations, paroxysmal nocturnal dyspnea and syncope.   Respiratory: Negative for cough, hemoptysis, shortness of breath, sleep disturbances due to breathing and wheezing.    Hematologic/Lymphatic: Negative for bleeding problem. Does not bruise/bleed easily.   Skin: Negative for poor wound healing.   Musculoskeletal: Negative for muscle cramps and myalgias.   Gastrointestinal: Negative for abdominal pain, anorexia, diarrhea, heartburn, hematemesis, hematochezia, melena, nausea and vomiting.   Genitourinary: Negative for hematuria and nocturia.   Neurological: Negative for excessive daytime sleepiness, dizziness, focal weakness, light-headedness and weakness.       Physical Exam  Vitals:    22 0841   BP: 132/78   Pulse: 78     Wt Readings from Last 1 Encounters:   22 64.7 kg (142 lb 11.2 oz)     Physical Exam  Constitutional:       General: She is not in acute distress.     Appearance: She is not toxic-appearing or diaphoretic.   HENT:      Head: Normocephalic and  atraumatic.   Eyes:      General: No scleral icterus.     Conjunctiva/sclera: Conjunctivae normal.   Neck:      Thyroid: No thyromegaly.      Vascular: No carotid bruit, hepatojugular reflux or JVD.      Trachea: Trachea normal.   Cardiovascular:      Rate and Rhythm: Normal rate and regular rhythm.  No extrasystoles are present.     Chest Wall: PMI is not displaced.      Pulses:           Carotid pulses are 2+ on the right side and 2+ on the left side.       Radial pulses are 2+ on the right side and 2+ on the left side.        Dorsalis pedis pulses are 2+ on the right side and 2+ on the left side.        Posterior tibial pulses are 2+ on the right side and 2+ on the left side.      Heart sounds: S1 normal and S2 normal. No murmur heard.    No S3 or S4 sounds.   Pulmonary:      Effort: No accessory muscle usage or respiratory distress.      Breath sounds: No decreased breath sounds, wheezing, rhonchi or rales.   Abdominal:      General: Bowel sounds are normal. There is no abdominal bruit.      Palpations: Abdomen is soft. There is no hepatomegaly, splenomegaly or pulsatile mass.      Tenderness: There is no abdominal tenderness.   Musculoskeletal:         General: No tenderness or deformity.      Right lower leg: No edema.      Left lower leg: No edema.   Skin:     General: Skin is warm and dry.      Coloration: Skin is not cyanotic or pale.      Nails: There is no clubbing.   Neurological:      General: No focal deficit present.      Mental Status: She is alert and oriented to person, place, and time.   Psychiatric:         Speech: Speech normal.         Behavior: Behavior normal. Behavior is cooperative.         Labs  Lab Visit on 04/05/2022   Component Date Value Ref Range Status    Sodium 04/05/2022 137  136 - 145 mmol/L Final    Potassium 04/05/2022 3.2 (A) 3.5 - 5.1 mmol/L Final    Chloride 04/05/2022 95  95 - 110 mmol/L Final    CO2 04/05/2022 31 (A) 23 - 29 mmol/L Final    Glucose 04/05/2022 90  70 -  110 mg/dL Final    BUN 04/05/2022 19  8 - 23 mg/dL Final    Creatinine 04/05/2022 1.1  0.5 - 1.4 mg/dL Final    Calcium 04/05/2022 9.7  8.7 - 10.5 mg/dL Final    Total Protein 04/05/2022 7.2  6.0 - 8.4 g/dL Final    Albumin 04/05/2022 3.8  3.5 - 5.2 g/dL Final    Total Bilirubin 04/05/2022 0.7  0.1 - 1.0 mg/dL Final    Comment: For infants and newborns, interpretation of results should be based  on gestational age, weight and in agreement with clinical  observations.    Premature Infant recommended reference ranges:  Up to 24 hours.............<8.0 mg/dL  Up to 48 hours............<12.0 mg/dL  3-5 days..................<15.0 mg/dL  6-29 days.................<15.0 mg/dL      Alkaline Phosphatase 04/05/2022 69  55 - 135 U/L Final    AST 04/05/2022 27  10 - 40 U/L Final    ALT 04/05/2022 20  10 - 44 U/L Final    Anion Gap 04/05/2022 11  8 - 16 mmol/L Final    eGFR if  04/05/2022 58 (A) >60 mL/min/1.73 m^2 Final    eGFR if non African American 04/05/2022 50 (A) >60 mL/min/1.73 m^2 Final    Comment: Calculation used to obtain the estimated glomerular filtration  rate (eGFR) is the CKD-EPI equation.       Cholesterol 04/05/2022 176  120 - 199 mg/dL Final    Comment: The National Cholesterol Education Program (NCEP) has set the  following guidelines (reference ranges) for Cholesterol:  Optimal.....................<200 mg/dL  Borderline High.............200-239 mg/dL  High........................> or = 240 mg/dL      Triglycerides 04/05/2022 88  30 - 150 mg/dL Final    Comment: The National Cholesterol Education Program (NCEP) has set the  following guidelines (reference values) for triglycerides:  Normal......................<150 mg/dL  Borderline High.............150-199 mg/dL  High........................200-499 mg/dL      HDL 04/05/2022 69  40 - 75 mg/dL Final    Comment: The National Cholesterol Education Program (NCEP) has set the  following guidelines (reference values) for HDL  Cholesterol:  Low...............<40 mg/dL  Optimal...........>60 mg/dL      LDL Cholesterol 04/05/2022 89.4  63.0 - 159.0 mg/dL Final    Comment: The National Cholesterol Education Program (NCEP) has set the  following guidelines (reference values) for LDL Cholesterol:  Optimal.......................<130 mg/dL  Borderline High...............130-159 mg/dL  High..........................160-189 mg/dL  Very High.....................>190 mg/dL      HDL/Cholesterol Ratio 04/05/2022 39.2  20.0 - 50.0 % Final    Total Cholesterol/HDL Ratio 04/05/2022 2.6  2.0 - 5.0 Final    Non-HDL Cholesterol 04/05/2022 107  mg/dL Final    Comment: Risk category and Non-HDL cholesterol goals:  Coronary heart disease (CHD)or equivalent (10-year risk of CHD >20%):  Non-HDL cholesterol goal     <130 mg/dL  Two or more CHD risk factors and 10-year risk of CHD <= 20%:  Non-HDL cholesterol goal     <160 mg/dL  0 to 1 CHD risk factor:  Non-HDL cholesterol goal     <190 mg/dL         Imaging  X-Ray Foot Complete Right    Result Date: 3/16/2022  EXAMINATION: XR FOOT COMPLETE 3 VIEW RIGHT CLINICAL HISTORY: pain;. Enthesopathy, unspecified TECHNIQUE: AP, lateral, and oblique views of the right foot were performed. COMPARISON: Foot radiographs 12/30/2020 FINDINGS: Hallux valgus deformity.  Multiple hammertoe deformities.  Mild scattered degenerative changes.  Chronic posttraumatic deformity of the 5th metatarsal shaft.  No acute fracture or dislocation.  Achilles enthesopathy.  Edema in Kager's fat pad.     As above. Electronically signed by: Slick Cho Date:    03/16/2022 Time:    10:47      Assessment  1. Stenosis of right carotid artery  Monitor  - CV Ultrasound Bilateral Doppler Carotid; Future    2. Atherosclerosis of native coronary artery of native heart without angina pectoris  Nonobstructive    3. Hypercholesterolemia  Well controlled    4. Essential hypertension  Well controlled    5. Stage 3a chronic kidney disease  Noted      Plan  and Discussion    Continue current guideline directed medical therapy.    The 10-year ASCVD risk score (Cypressjoey EVANS Jr., et al., 2013) is: 15.6%    Values used to calculate the score:      Age: 71 years      Sex: Female      Is Non- : No      Diabetic: No      Tobacco smoker: No      Systolic Blood Pressure: 132 mmHg      Is BP treated: Yes      HDL Cholesterol: 69 mg/dL      Total Cholesterol: 176 mg/dL     Follow Up  Follow up in about 1 year (around 4/12/2023).      Michael Lal MD

## 2022-04-19 ENCOUNTER — OFFICE VISIT (OUTPATIENT)
Dept: PODIATRY | Facility: CLINIC | Age: 72
End: 2022-04-19
Payer: MEDICARE

## 2022-04-19 VITALS
HEART RATE: 81 BPM | HEIGHT: 64 IN | BODY MASS INDEX: 24.24 KG/M2 | SYSTOLIC BLOOD PRESSURE: 138 MMHG | DIASTOLIC BLOOD PRESSURE: 84 MMHG | WEIGHT: 142 LBS

## 2022-04-19 DIAGNOSIS — M24.50 JOINT CONTRACTURE: ICD-10-CM

## 2022-04-19 DIAGNOSIS — M20.41 HAMMER TOE OF RIGHT FOOT: ICD-10-CM

## 2022-04-19 DIAGNOSIS — M79.671 FOOT PAIN, RIGHT: ICD-10-CM

## 2022-04-19 DIAGNOSIS — M77.9 CAPSULITIS: Primary | ICD-10-CM

## 2022-04-19 DIAGNOSIS — M24.573 EQUINUS CONTRACTURE OF ANKLE: ICD-10-CM

## 2022-04-19 PROCEDURE — 99214 OFFICE O/P EST MOD 30 MIN: CPT | Mod: S$GLB,,, | Performed by: PODIATRIST

## 2022-04-19 PROCEDURE — 3079F PR MOST RECENT DIASTOLIC BLOOD PRESSURE 80-89 MM HG: ICD-10-PCS | Mod: CPTII,S$GLB,, | Performed by: PODIATRIST

## 2022-04-19 PROCEDURE — 3288F PR FALLS RISK ASSESSMENT DOCUMENTED: ICD-10-PCS | Mod: CPTII,S$GLB,, | Performed by: PODIATRIST

## 2022-04-19 PROCEDURE — 3288F FALL RISK ASSESSMENT DOCD: CPT | Mod: CPTII,S$GLB,, | Performed by: PODIATRIST

## 2022-04-19 PROCEDURE — 1101F PR PT FALLS ASSESS DOC 0-1 FALLS W/OUT INJ PAST YR: ICD-10-PCS | Mod: CPTII,S$GLB,, | Performed by: PODIATRIST

## 2022-04-19 PROCEDURE — 3079F DIAST BP 80-89 MM HG: CPT | Mod: CPTII,S$GLB,, | Performed by: PODIATRIST

## 2022-04-19 PROCEDURE — 1160F RVW MEDS BY RX/DR IN RCRD: CPT | Mod: CPTII,S$GLB,, | Performed by: PODIATRIST

## 2022-04-19 PROCEDURE — 1160F PR REVIEW ALL MEDS BY PRESCRIBER/CLIN PHARMACIST DOCUMENTED: ICD-10-PCS | Mod: CPTII,S$GLB,, | Performed by: PODIATRIST

## 2022-04-19 PROCEDURE — 1101F PT FALLS ASSESS-DOCD LE1/YR: CPT | Mod: CPTII,S$GLB,, | Performed by: PODIATRIST

## 2022-04-19 PROCEDURE — 1126F PR PAIN SEVERITY QUANTIFIED, NO PAIN PRESENT: ICD-10-PCS | Mod: CPTII,S$GLB,, | Performed by: PODIATRIST

## 2022-04-19 PROCEDURE — 3008F PR BODY MASS INDEX (BMI) DOCUMENTED: ICD-10-PCS | Mod: CPTII,S$GLB,, | Performed by: PODIATRIST

## 2022-04-19 PROCEDURE — 3075F SYST BP GE 130 - 139MM HG: CPT | Mod: CPTII,S$GLB,, | Performed by: PODIATRIST

## 2022-04-19 PROCEDURE — 99999 PR PBB SHADOW E&M-EST. PATIENT-LVL III: ICD-10-PCS | Mod: PBBFAC,,, | Performed by: PODIATRIST

## 2022-04-19 PROCEDURE — 3075F PR MOST RECENT SYSTOLIC BLOOD PRESS GE 130-139MM HG: ICD-10-PCS | Mod: CPTII,S$GLB,, | Performed by: PODIATRIST

## 2022-04-19 PROCEDURE — 3008F BODY MASS INDEX DOCD: CPT | Mod: CPTII,S$GLB,, | Performed by: PODIATRIST

## 2022-04-19 PROCEDURE — 99214 PR OFFICE/OUTPT VISIT, EST, LEVL IV, 30-39 MIN: ICD-10-PCS | Mod: S$GLB,,, | Performed by: PODIATRIST

## 2022-04-19 PROCEDURE — 1126F AMNT PAIN NOTED NONE PRSNT: CPT | Mod: CPTII,S$GLB,, | Performed by: PODIATRIST

## 2022-04-19 PROCEDURE — 99999 PR PBB SHADOW E&M-EST. PATIENT-LVL III: CPT | Mod: PBBFAC,,, | Performed by: PODIATRIST

## 2022-04-19 PROCEDURE — 1159F MED LIST DOCD IN RCRD: CPT | Mod: CPTII,S$GLB,, | Performed by: PODIATRIST

## 2022-04-19 PROCEDURE — 1159F PR MEDICATION LIST DOCUMENTED IN MEDICAL RECORD: ICD-10-PCS | Mod: CPTII,S$GLB,, | Performed by: PODIATRIST

## 2022-04-19 RX ORDER — KETOCONAZOLE 20 MG/G
CREAM TOPICAL
Status: ON HOLD | COMMUNITY
Start: 2021-11-03 | End: 2023-05-30 | Stop reason: HOSPADM

## 2022-04-19 NOTE — PROGRESS NOTES
Subjective:      Patient ID: Jessica Floyd is a 71 y.o. female.    Chief Complaint: Foot Pain (R foot)    Sharp pain bottom right forefoot.  Gradual onset, worsening over past several weeks, aggravated by increased weight bearing, shoe gear, pressure.  No previous medical treatment.  OTC pain med not helping. Denies trauma.  3 surgeries right foot.    Review of Systems   Constitutional: Negative for chills, diaphoresis, fever, malaise/fatigue and night sweats.   Cardiovascular: Negative for claudication, cyanosis, leg swelling and syncope.   Skin: Negative for color change, dry skin, nail changes, rash, suspicious lesions and unusual hair distribution.   Musculoskeletal: Positive for joint pain. Negative for falls, joint swelling, muscle cramps, muscle weakness and stiffness.   Gastrointestinal: Negative for constipation, diarrhea, nausea and vomiting.   Neurological: Negative for brief paralysis, disturbances in coordination, focal weakness, numbness, paresthesias, sensory change and tremors.           Objective:      Physical Exam  Constitutional:       General: She is not in acute distress.     Appearance: She is well-developed. She is not diaphoretic.   Cardiovascular:      Pulses:           Popliteal pulses are 2+ on the right side and 2+ on the left side.        Dorsalis pedis pulses are 2+ on the right side and 2+ on the left side.        Posterior tibial pulses are 2+ on the right side and 2+ on the left side.      Comments: Capillary refill 3 seconds all toes/distal feet, all toes/both feet warm to touch.      Negative lymphadenopathy bilateral popliteal fossa and tarsal tunnel.      Negavie lower extremity edema bilateral.    Musculoskeletal:      Right ankle: No swelling, deformity, ecchymosis or lacerations. Normal range of motion. Normal pulse.      Right Achilles Tendon: Normal. No defects. Lopez's test negative.      Comments: Pain to palpation inferior mtpj 3,4 right without evidence of trauma or  infection.    Ankle dorsiflexion decreased at <10 degrees bilateral with moderate increase with knee flexion bilateral.    Hammertoes 2 through 5 right partially reducible with dorsal MTPJ contracture partially reducible.       Lymphadenopathy:      Lower Body: No right inguinal adenopathy. No left inguinal adenopathy.      Comments: Negative lymphadenopathy bilateral popliteal fossa and tarsal tunnel.    Negative lymphangitic streaking bilateral feet/ankles/legs.   Skin:     General: Skin is warm and dry.      Capillary Refill: Capillary refill takes 2 to 3 seconds.      Coloration: Skin is not pale.      Findings: No abrasion, bruising, burn, ecchymosis, erythema, laceration, lesion or rash.      Nails: There is no clubbing.      Comments:   Skin is normal age and health appropriate color, turgor, texture, and temperature bilateral lower extremities without ulceration, hyperpigmentation, discoloration, masses nodules or cords palpated.  No ecchymosis, erythema, edema, or cardinal signs of infection bilateral lower extremities.     Neurological:      Mental Status: She is alert and oriented to person, place, and time.      Sensory: No sensory deficit.      Motor: No tremor, atrophy or abnormal muscle tone.      Gait: Gait normal.      Deep Tendon Reflexes:      Reflex Scores:       Patellar reflexes are 2+ on the right side and 2+ on the left side.       Achilles reflexes are 2+ on the right side and 2+ on the left side.     Comments:   Negative moulder sign/click bilateral all intermetatarsal spaces.    Negative tinel sign to percussion sural, superficial peroneal, deep peroneal, saphenous, and posterior tibial nerves right and left ankles and feet.    Negative allodynia both feet   Psychiatric:         Behavior: Behavior is cooperative.               Assessment:       Encounter Diagnoses   Name Primary?    Capsulitis Yes    Foot pain, right     Joint contracture     Hammer toe of right foot     Equinus  contracture of ankle          Plan:       Jessica was seen today for foot pain.    Diagnoses and all orders for this visit:    Capsulitis  -     Ambulatory referral/consult to Physical/Occupational Therapy; Future  -     ORTHOTIC DEVICE (DME)    Foot pain, right  -     ORTHOTIC DEVICE (DME)    Joint contracture  -     Ambulatory referral/consult to Physical/Occupational Therapy; Future  -     ORTHOTIC DEVICE (DME)    Hammer toe of right foot  -     ORTHOTIC DEVICE (DME)    Equinus contracture of ankle  -     Ambulatory referral/consult to Physical/Occupational Therapy; Future  -     ORTHOTIC DEVICE (DME)      I counseled the patient on her conditions, their implications and medical management.        Patient will stretch the tendo achilles complex three times daily as demonstrated in the office.  Literature was dispensed illustrating proper stretching technique.  .  I applied a plantar rest strapping to the patient's right foot to offload symptomatic area, support the arch, and relieve pain.    Patient will obtain over the counter arch supports and wear them in shoes whenever possible.  Athletic shoes intended for walking or running are usually best.    Discussed conservative treatment with shoes of adequate dimensions, material, and style to alleviate symptoms and delay or prevent surgical intervention.    Lidocaine            No follow-ups on file.

## 2022-04-20 ENCOUNTER — HOSPITAL ENCOUNTER (OUTPATIENT)
Dept: CARDIOLOGY | Facility: OTHER | Age: 72
Discharge: HOME | End: 2022-04-20
Attending: INTERNAL MEDICINE
Payer: MEDICARE

## 2022-04-20 ENCOUNTER — IMMUNIZATION (OUTPATIENT)
Dept: INTERNAL MEDICINE | Facility: CLINIC | Age: 72
End: 2022-04-20
Payer: MEDICARE

## 2022-04-20 DIAGNOSIS — Z23 NEED FOR VACCINATION: Primary | ICD-10-CM

## 2022-04-20 DIAGNOSIS — I65.21 STENOSIS OF RIGHT CAROTID ARTERY: ICD-10-CM

## 2022-04-20 LAB
LEFT ARM DIASTOLIC BLOOD PRESSURE: 62 MMHG
LEFT ARM SYSTOLIC BLOOD PRESSURE: 118 MMHG
LEFT CBA DIAS: 8 CM/S
LEFT CBA SYS: 33 CM/S
LEFT CCA DIST DIAS: 10 CM/S
LEFT CCA DIST SYS: 44 CM/S
LEFT CCA MID DIAS: 10 CM/S
LEFT CCA MID SYS: 46 CM/S
LEFT CCA PROX DIAS: 11 CM/S
LEFT CCA PROX SYS: 50 CM/S
LEFT ECA DIAS: 10 CM/S
LEFT ECA SYS: 50 CM/S
LEFT ICA DIST DIAS: 18 CM/S
LEFT ICA DIST SYS: 69 CM/S
LEFT ICA MID DIAS: 17 CM/S
LEFT ICA MID SYS: 55 CM/S
LEFT ICA PROX DIAS: 11 CM/S
LEFT ICA PROX SYS: 40 CM/S
LEFT VERTEBRAL DIAS: 8 CM/S
LEFT VERTEBRAL SYS: 34 CM/S
OHS CV CAROTID RIGHT ICA EDV HIGHEST: 20
OHS CV CAROTID ULTRASOUND LEFT ICA/CCA RATIO: 1.57
OHS CV CAROTID ULTRASOUND RIGHT ICA/CCA RATIO: 1.83
OHS CV PV CAROTID LEFT HIGHEST CCA: 50
OHS CV PV CAROTID LEFT HIGHEST ICA: 69
OHS CV PV CAROTID RIGHT HIGHEST CCA: 42
OHS CV PV CAROTID RIGHT HIGHEST ICA: 66
OHS CV US CAROTID LEFT HIGHEST EDV: 18
RIGHT ARM DIASTOLIC BLOOD PRESSURE: 67 MMHG
RIGHT ARM SYSTOLIC BLOOD PRESSURE: 120 MMHG
RIGHT CBA DIAS: 9 CM/S
RIGHT CBA SYS: 43 CM/S
RIGHT CCA DIST DIAS: 8 CM/S
RIGHT CCA DIST SYS: 36 CM/S
RIGHT CCA MID DIAS: 9 CM/S
RIGHT CCA MID SYS: 42 CM/S
RIGHT CCA PROX DIAS: 9 CM/S
RIGHT CCA PROX SYS: 42 CM/S
RIGHT ECA DIAS: 13 CM/S
RIGHT ECA SYS: 70 CM/S
RIGHT ICA DIST DIAS: 16 CM/S
RIGHT ICA DIST SYS: 59 CM/S
RIGHT ICA MID DIAS: 20 CM/S
RIGHT ICA MID SYS: 66 CM/S
RIGHT ICA PROX DIAS: 14 CM/S
RIGHT ICA PROX SYS: 51 CM/S
RIGHT VERTEBRAL DIAS: 8 CM/S
RIGHT VERTEBRAL SYS: 33 CM/S

## 2022-04-20 PROCEDURE — 93880 EXTRACRANIAL BILAT STUDY: CPT

## 2022-04-20 PROCEDURE — 91305 COVID-19, MRNA, LNP-S, PF, 30 MCG/0.3 ML DOSE VACCINE (PFIZER): CPT | Mod: PBBFAC | Performed by: INTERNAL MEDICINE

## 2022-04-20 PROCEDURE — 93880 EXTRACRANIAL BILAT STUDY: CPT | Mod: 26,,, | Performed by: INTERNAL MEDICINE

## 2022-04-20 PROCEDURE — 93880 CV US DOPPLER CAROTID (CUPID ONLY): ICD-10-PCS | Mod: 26,,, | Performed by: INTERNAL MEDICINE

## 2022-04-27 ENCOUNTER — CLINICAL SUPPORT (OUTPATIENT)
Dept: REHABILITATION | Facility: OTHER | Age: 72
End: 2022-04-27
Attending: PODIATRIST
Payer: MEDICARE

## 2022-04-27 DIAGNOSIS — M24.573 EQUINUS CONTRACTURE OF ANKLE: ICD-10-CM

## 2022-04-27 DIAGNOSIS — M77.9 CAPSULITIS: ICD-10-CM

## 2022-04-27 DIAGNOSIS — M24.50 JOINT CONTRACTURE: ICD-10-CM

## 2022-04-27 DIAGNOSIS — R26.2 DIFFICULTY WALKING: ICD-10-CM

## 2022-04-27 PROBLEM — R26.89 BALANCE PROBLEM: Status: RESOLVED | Noted: 2022-01-04 | Resolved: 2022-04-27

## 2022-04-27 PROCEDURE — 97162 PT EVAL MOD COMPLEX 30 MIN: CPT | Mod: PN

## 2022-04-27 PROCEDURE — 97110 THERAPEUTIC EXERCISES: CPT | Mod: PN

## 2022-04-27 PROCEDURE — 97112 NEUROMUSCULAR REEDUCATION: CPT | Mod: PN
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
## 2022-05-03 ENCOUNTER — DOCUMENTATION ONLY (OUTPATIENT)
Dept: REHABILITATION | Facility: OTHER | Age: 72
End: 2022-05-03
Payer: MEDICARE

## 2022-05-03 ENCOUNTER — CLINICAL SUPPORT (OUTPATIENT)
Dept: REHABILITATION | Facility: OTHER | Age: 72
End: 2022-05-03
Payer: MEDICARE

## 2022-05-03 DIAGNOSIS — R26.2 DIFFICULTY WALKING: Primary | ICD-10-CM

## 2022-05-03 PROCEDURE — 97112 NEUROMUSCULAR REEDUCATION: CPT | Mod: PN,CQ

## 2022-05-03 PROCEDURE — 97530 THERAPEUTIC ACTIVITIES: CPT | Mod: PN,CQ

## 2022-05-03 PROCEDURE — 97110 THERAPEUTIC EXERCISES: CPT | Mod: PN,CQ

## 2022-05-03 NOTE — PROGRESS NOTES
"[facility]   Physical Therapy Treatment Note     Name: Jessica Floyd  Clinic Number: [id number]    Therapy Diagnosis:   Encounter Diagnosis   Name Primary?    Difficulty walking Yes     Physician: Juan Dunaway DPM    Visit Date: 5/3/2022    Physician Orders: PT Eval and Treat   Medical Diagnosis from Referral: M77.9 (ICD-10-CM) - Capsulitis M24.50 (ICD-10-CM) - Joint contracture M24.573 (ICD-10-CM) - Equinus contracture of ankle   Evaluation Date: 4/27/2022  Authorization Period Expiration: 4/19/2023  Plan of Care Expiration: 6/27/2022  Visit # / Visits authorized: 2/20  FOTO: #1/3 on 4/27/2022     Precautions: Fall       PTA Visit #: 1/5     Time In: 0755  Time Out: 0853  Total Billable Time: 53 minutes    SUBJECTIVE     She was compliant with home exercise program.  Response to previous treatment: felt fine. States she wants to work more on her balance as she feels like she has regressed with her walking/standing balance. States he has to eave early for a MD appointment.   Functional change: n/a    Prior Level of Function: walking dog 2 miles/day twice a day, lifting 3 gallon watering can and walking for gardening   Current Level of Function: walking 6 blocks, negotiating uneven terrain, squatting to lift objects from the ground      Pain:  Current 1/10, worst 10/10, best 1/10   Location: L knee, R ankle/foot   Description: feels more unsteady when rushing and trying to get somewhere quickly   Aggravating Factors: worse with prolonged walking   Easing Factors: use of cane for confidence      Patients goals: build confidence in balance and improve mobility       OBJECTIVE     Objective Measures updated at progress report unless specified.     Treatment     Jessica received the treatments listed below:      therapeutic exercises for 18 minutes:    +LAQ 2# 2x10 R/L  +Seated hip flexion 2#   +Standing HR 3x10     neuromuscular re-education for 20 minutes:   Single limb stand 20" x3 R/L, " "B HRA   Tandem walk, x 5 laps in // , unilateral HRA   Static stand, narrow ASHLEY, EC, 15"x5, B HRA        therapeutic activities for 15 minutes:   +Shuttle single limb leg press, R/L LE, 1 black cords, 2x10 each  +[place] carries 5# BD x 2 laps around gym        gait training for minutes:         Patient Education and Home Exercises     Home Exercises Provided and Patient Education Provided     Education provided:   - posture and ASHLEY when walking    Written Home Exercises Provided: Patient instructed to cont prior HEP. Exercises were reviewed and Jessica was able to demonstrate them prior to the end of the session.  Jessica demonstrated good  understanding of the education provided. See EMR under Patient Instructions for exercises provided during therapy sessions    ASSESSMENT     Pt presents with decreased stride length and NBOS with clinic ambulation. Postural sway was noted with exercises involving reduced ASHLEY. Added functional carry exercises for improved ADL performance.     Jessica Is progressing well towards her goals.     Patient prognosis is Good.        Pt will continue to benefit from skilled outpatient physical therapy to address the deficits listed in the problem list box on initial evaluation, provide pt/family education and to maximize pt's level of independence in the home and community environment.     Pt's spiritual, cultural and educational needs considered and pt agreeable to plan of care and goals.       Anticipated Barriers for therapy: fear avoidance, chronicity of current condition, multiple tx areas       Goals:  Short Term Goals: 3 weeks   (1) patient will be I with HOME EXERCISE PROGRAM (initial, not met)   (2) patient will obtain 4+/5 R hip flexion MMT to facilitate improved ability to step over obstacles on ground with improved safety and efficiency (initial, not met)   (3) patient will walk X 50 ft while carrying #15 unilaterally to facilitate return to previous level of function for " "gardening (initial, not met)      Long Term Goals: 6 weeks   (1) patient will complete TUG, no AD, < 10" average to facilitate indicate decreased risk of fall during community mobility tasks (initial, not met)   (2) patient will negotiate uneven terrain while gardening X  100 ft, no loss of balance, to indicate decreased risk of fall (initial, not met)  (3) patient will tolerate walking dog 2 miles/day to facilitate return to previous level of function (initial, not met)       PLAN     Plan of care Certification: 4/27/2022 to 6/27/2022    Focus on balance/proprioception, LE strengthening with emphasis on ADL's/carrying objects.      Outpatient Physical Therapy 2 times weekly for 6 weeks to include the following interventions: Cervical/Lumbar Traction, Electrical Stimulation , re-eval, dry needling , Gait Training, Manual Therapy, Moist Heat/ Ice, Neuromuscular Re-ed, Patient Education, Self Care, Therapeutic Activities and Therapeutic Exercise.      Physical therapist and physical therapy assistant(s) met face to face to discuss patient's treatment plan and progress towards established goals. Pt will be seen by a physical therapist minimally every 6th visit or every 30 days.      Harsha Benitez PTA        "

## 2022-05-03 NOTE — PROGRESS NOTES
Physical Therapist and Physical Therapist Assistant met face to face to discuss patient's treatment plan and progress towards established goals. Pt will be seen by a physical therapist minimally every 6th visit or every 30 days.    Harsha Benitez, PTA  5/3/2022

## 2022-05-11 ENCOUNTER — OFFICE VISIT (OUTPATIENT)
Dept: PRIMARY CARE CLINIC | Facility: CLINIC | Age: 72
End: 2022-05-11
Attending: FAMILY MEDICINE
Payer: MEDICARE

## 2022-05-11 ENCOUNTER — LAB VISIT (OUTPATIENT)
Dept: LAB | Facility: HOSPITAL | Age: 72
End: 2022-05-11
Payer: MEDICARE

## 2022-05-11 VITALS
OXYGEN SATURATION: 96 % | SYSTOLIC BLOOD PRESSURE: 135 MMHG | WEIGHT: 145.81 LBS | BODY MASS INDEX: 24.89 KG/M2 | HEART RATE: 73 BPM | DIASTOLIC BLOOD PRESSURE: 77 MMHG | HEIGHT: 64 IN

## 2022-05-11 DIAGNOSIS — I65.23 BILATERAL CAROTID ARTERY STENOSIS: ICD-10-CM

## 2022-05-11 DIAGNOSIS — E55.9 VITAMIN D DEFICIENCY: ICD-10-CM

## 2022-05-11 DIAGNOSIS — M25.50 ARTHRALGIA, UNSPECIFIED JOINT: Primary | ICD-10-CM

## 2022-05-11 DIAGNOSIS — I10 PRIMARY HYPERTENSION: ICD-10-CM

## 2022-05-11 DIAGNOSIS — E78.5 DYSLIPIDEMIA: ICD-10-CM

## 2022-05-11 DIAGNOSIS — Z78.0 POST-MENOPAUSAL: ICD-10-CM

## 2022-05-11 DIAGNOSIS — M79.641 PAIN OF RIGHT HAND: ICD-10-CM

## 2022-05-11 DIAGNOSIS — R79.9 ABNORMAL BLOOD FINDING: ICD-10-CM

## 2022-05-11 DIAGNOSIS — E05.90 SUBCLINICAL HYPERTHYROIDISM: ICD-10-CM

## 2022-05-11 DIAGNOSIS — N18.31 STAGE 3A CHRONIC KIDNEY DISEASE: ICD-10-CM

## 2022-05-11 DIAGNOSIS — K21.9 GASTROESOPHAGEAL REFLUX DISEASE WITHOUT ESOPHAGITIS: ICD-10-CM

## 2022-05-11 LAB
25(OH)D3+25(OH)D2 SERPL-MCNC: 37 NG/ML (ref 30–96)
BASOPHILS # BLD AUTO: 0.09 K/UL (ref 0–0.2)
BASOPHILS NFR BLD: 1.2 % (ref 0–1.9)
DIFFERENTIAL METHOD: ABNORMAL
EOSINOPHIL # BLD AUTO: 0.3 K/UL (ref 0–0.5)
EOSINOPHIL NFR BLD: 3.8 % (ref 0–8)
ERYTHROCYTE [DISTWIDTH] IN BLOOD BY AUTOMATED COUNT: 14.6 % (ref 11.5–14.5)
ESTIMATED AVG GLUCOSE: 108 MG/DL (ref 68–131)
HBA1C MFR BLD: 5.4 % (ref 4–5.6)
HCT VFR BLD AUTO: 40 % (ref 37–48.5)
HGB BLD-MCNC: 13.6 G/DL (ref 12–16)
IMM GRANULOCYTES # BLD AUTO: 0.02 K/UL (ref 0–0.04)
IMM GRANULOCYTES NFR BLD AUTO: 0.3 % (ref 0–0.5)
LYMPHOCYTES # BLD AUTO: 1.9 K/UL (ref 1–4.8)
LYMPHOCYTES NFR BLD: 25.4 % (ref 18–48)
MCH RBC QN AUTO: 34.3 PG (ref 27–31)
MCHC RBC AUTO-ENTMCNC: 34 G/DL (ref 32–36)
MCV RBC AUTO: 101 FL (ref 82–98)
MONOCYTES # BLD AUTO: 1 K/UL (ref 0.3–1)
MONOCYTES NFR BLD: 13.3 % (ref 4–15)
NEUTROPHILS # BLD AUTO: 4.1 K/UL (ref 1.8–7.7)
NEUTROPHILS NFR BLD: 56 % (ref 38–73)
NRBC BLD-RTO: 0 /100 WBC
PLATELET # BLD AUTO: 343 K/UL (ref 150–450)
PMV BLD AUTO: 10.5 FL (ref 9.2–12.9)
RBC # BLD AUTO: 3.96 M/UL (ref 4–5.4)
RHEUMATOID FACT SERPL-ACNC: <13 IU/ML (ref 0–15)
TSH SERPL DL<=0.005 MIU/L-ACNC: 1.33 UIU/ML (ref 0.4–4)
WBC # BLD AUTO: 7.31 K/UL (ref 3.9–12.7)

## 2022-05-11 PROCEDURE — 3075F PR MOST RECENT SYSTOLIC BLOOD PRESS GE 130-139MM HG: ICD-10-PCS | Mod: CPTII,S$GLB,, | Performed by: FAMILY MEDICINE

## 2022-05-11 PROCEDURE — 99999 PR PBB SHADOW E&M-EST. PATIENT-LVL V: CPT | Mod: PBBFAC,,, | Performed by: FAMILY MEDICINE

## 2022-05-11 PROCEDURE — 3044F PR MOST RECENT HEMOGLOBIN A1C LEVEL <7.0%: ICD-10-PCS | Mod: CPTII,S$GLB,, | Performed by: FAMILY MEDICINE

## 2022-05-11 PROCEDURE — 1101F PR PT FALLS ASSESS DOC 0-1 FALLS W/OUT INJ PAST YR: ICD-10-PCS | Mod: CPTII,S$GLB,, | Performed by: FAMILY MEDICINE

## 2022-05-11 PROCEDURE — 83036 HEMOGLOBIN GLYCOSYLATED A1C: CPT | Performed by: FAMILY MEDICINE

## 2022-05-11 PROCEDURE — 36415 COLL VENOUS BLD VENIPUNCTURE: CPT | Mod: PN | Performed by: FAMILY MEDICINE

## 2022-05-11 PROCEDURE — 86431 RHEUMATOID FACTOR QUANT: CPT | Performed by: FAMILY MEDICINE

## 2022-05-11 PROCEDURE — 3288F FALL RISK ASSESSMENT DOCD: CPT | Mod: CPTII,S$GLB,, | Performed by: FAMILY MEDICINE

## 2022-05-11 PROCEDURE — 84443 ASSAY THYROID STIM HORMONE: CPT | Performed by: FAMILY MEDICINE

## 2022-05-11 PROCEDURE — 3078F DIAST BP <80 MM HG: CPT | Mod: CPTII,S$GLB,, | Performed by: FAMILY MEDICINE

## 2022-05-11 PROCEDURE — 3008F BODY MASS INDEX DOCD: CPT | Mod: CPTII,S$GLB,, | Performed by: FAMILY MEDICINE

## 2022-05-11 PROCEDURE — 3044F HG A1C LEVEL LT 7.0%: CPT | Mod: CPTII,S$GLB,, | Performed by: FAMILY MEDICINE

## 2022-05-11 PROCEDURE — 99215 OFFICE O/P EST HI 40 MIN: CPT | Mod: S$GLB,,, | Performed by: FAMILY MEDICINE

## 2022-05-11 PROCEDURE — 85025 COMPLETE CBC W/AUTO DIFF WBC: CPT | Performed by: FAMILY MEDICINE

## 2022-05-11 PROCEDURE — 1101F PT FALLS ASSESS-DOCD LE1/YR: CPT | Mod: CPTII,S$GLB,, | Performed by: FAMILY MEDICINE

## 2022-05-11 PROCEDURE — 1125F AMNT PAIN NOTED PAIN PRSNT: CPT | Mod: CPTII,S$GLB,, | Performed by: FAMILY MEDICINE

## 2022-05-11 PROCEDURE — 3008F PR BODY MASS INDEX (BMI) DOCUMENTED: ICD-10-PCS | Mod: CPTII,S$GLB,, | Performed by: FAMILY MEDICINE

## 2022-05-11 PROCEDURE — 99999 PR PBB SHADOW E&M-EST. PATIENT-LVL V: ICD-10-PCS | Mod: PBBFAC,,, | Performed by: FAMILY MEDICINE

## 2022-05-11 PROCEDURE — 99215 PR OFFICE/OUTPT VISIT, EST, LEVL V, 40-54 MIN: ICD-10-PCS | Mod: S$GLB,,, | Performed by: FAMILY MEDICINE

## 2022-05-11 PROCEDURE — 82306 VITAMIN D 25 HYDROXY: CPT | Performed by: FAMILY MEDICINE

## 2022-05-11 PROCEDURE — 3075F SYST BP GE 130 - 139MM HG: CPT | Mod: CPTII,S$GLB,, | Performed by: FAMILY MEDICINE

## 2022-05-11 PROCEDURE — 1125F PR PAIN SEVERITY QUANTIFIED, PAIN PRESENT: ICD-10-PCS | Mod: CPTII,S$GLB,, | Performed by: FAMILY MEDICINE

## 2022-05-11 PROCEDURE — 3288F PR FALLS RISK ASSESSMENT DOCUMENTED: ICD-10-PCS | Mod: CPTII,S$GLB,, | Performed by: FAMILY MEDICINE

## 2022-05-11 PROCEDURE — 3078F PR MOST RECENT DIASTOLIC BLOOD PRESSURE < 80 MM HG: ICD-10-PCS | Mod: CPTII,S$GLB,, | Performed by: FAMILY MEDICINE

## 2022-05-13 DIAGNOSIS — E78.5 DYSLIPIDEMIA: ICD-10-CM

## 2022-05-13 RX ORDER — ATORVASTATIN CALCIUM 80 MG/1
TABLET, FILM COATED ORAL
Qty: 90 TABLET | Refills: 3 | Status: SHIPPED | OUTPATIENT
Start: 2022-05-13 | End: 2023-06-28

## 2022-05-13 RX ORDER — MONTELUKAST SODIUM 10 MG/1
TABLET ORAL
Qty: 90 TABLET | Refills: 0 | Status: SHIPPED | OUTPATIENT
Start: 2022-05-13 | End: 2022-06-22 | Stop reason: SDUPTHER

## 2022-05-13 RX ORDER — CARVEDILOL 12.5 MG/1
TABLET ORAL
Qty: 180 TABLET | Refills: 3 | Status: SHIPPED | OUTPATIENT
Start: 2022-05-13 | End: 2023-03-05 | Stop reason: SDUPTHER

## 2022-05-13 NOTE — TELEPHONE ENCOUNTER
Refill Routing Note   Medication(s) are not appropriate for processing by [facility] for the following reason(s):      - Medication is a new start (<3 months)    [facility] action(s):  Defer          Medication reconciliation completed: No     Appointments  past 12m or future 3m with PCP    Date Provider   Last Visit   5/11/2022 Effie Olmedo MD   Next Visit   5/13/2022 Effie Olmedo MD   ED visits in past 90 days: 0        Note composed:12:28 PM 05/13/2022

## 2022-05-13 NOTE — TELEPHONE ENCOUNTER
No new care gaps identified.  [facility] Embedded Care Gaps. Reference number: [id number]. 5/13/2022   9:18:44 AM SAMARAT

## 2022-05-13 NOTE — TELEPHONE ENCOUNTER
No new care gaps identified.  [facility] Embedded Care Gaps. Reference number: [id number]. 5/13/2022   9:17:32 AM CDT

## 2022-05-13 NOTE — TELEPHONE ENCOUNTER
No new care gaps identified.  [facility] Embedded Care Gaps. Reference number: [id number]. 5/13/2022   9:18:04 AM SAMARAT

## 2022-05-13 NOTE — TELEPHONE ENCOUNTER
Refill Authorization Note   Jessica Floyd  is requesting a refill authorization.  Brief Assessment and Rationale for Refill:  Approve     Medication Therapy Plan:       Medication Reconciliation Completed: No   Comments:     No Care Gaps recommended.     Note composed:12:27 PM 05/13/2022

## 2022-05-13 NOTE — TELEPHONE ENCOUNTER
Refill Authorization Note   Jessica Floyd  is requesting a refill authorization.  Brief Assessment and Rationale for Refill:  Approve     Medication Therapy Plan:       Medication Reconciliation Completed: No   Comments:     No Care Gaps recommended.     Note composed:12:26 PM 05/13/2022

## 2022-05-18 ENCOUNTER — CLINICAL SUPPORT (OUTPATIENT)
Dept: REHABILITATION | Facility: OTHER | Age: 72
End: 2022-05-18
Payer: MEDICARE

## 2022-05-18 DIAGNOSIS — R26.2 DIFFICULTY WALKING: Primary | ICD-10-CM

## 2022-05-18 PROCEDURE — 97110 THERAPEUTIC EXERCISES: CPT | Mod: PN,CQ

## 2022-05-18 PROCEDURE — 97530 THERAPEUTIC ACTIVITIES: CPT | Mod: PN,CQ

## 2022-05-18 PROCEDURE — 97112 NEUROMUSCULAR REEDUCATION: CPT | Mod: PN,CQ

## 2022-05-18 NOTE — PROGRESS NOTES
"[facility]   Physical Therapy Treatment Note     Name: Jessica Floyd  Clinic Number: [id number]    Therapy Diagnosis:   Encounter Diagnosis   Name Primary?    Difficulty walking Yes     Physician: Juan Dunaway DPM    Visit Date: 5/18/2022    Physician Orders: PT Eval and Treat   Medical Diagnosis from Referral: M77.9 (ICD-10-CM) - Capsulitis M24.50 (ICD-10-CM) - Joint contracture M24.573 (ICD-10-CM) - Equinus contracture of ankle   Evaluation Date: 4/27/2022  Authorization Period Expiration: 4/19/2023  Plan of Care Expiration: 6/27/2022  Visit # / Visits authorized: 3/20  FOTO: #2/3 on 5/18/2022     Precautions: Fall       PTA Visit #: 2/5     Time In: 0800  Time Out: 0853  Total Billable Time: 53 minutes    SUBJECTIVE     She was compliant with home exercise program.  Response to previous treatment: States she has some issues with her R foot as she has an abrasion from her skin "cracking" States she would like to leave a little early today as her daughter is going to pick her up after PT.   Functional change: n/a    Prior Level of Function: walking dog 2 miles/day twice a day, lifting 3 gallon watering can and walking for gardening   Current Level of Function: walking 6 blocks, negotiating uneven terrain, squatting to lift objects from the ground      Pain:  Current 2/10, worst 10/10, best 1/10   Location: L knee, R ankle/foot   Description: feels more unsteady when rushing and trying to get somewhere quickly   Aggravating Factors: worse with prolonged walking   Easing Factors: use of cane for confidence      Patients goals: build confidence in balance and improve mobility       OBJECTIVE     Objective Measures updated at progress report unless specified.     Treatment     Jessica received the treatments listed below:      therapeutic exercises for 23minutes:    LAQ 2# 3x10 R/L  Seated hip flexion 2#, 3x10, R/L  Standing HR 3x10  +Sit to stand with piliates ring squeeze 3x10   " "  neuromuscular re-education for 15 minutes:     Single limb stand 20" x3 R/L, B HRA   Tandem walk, x 5 laps in // , unilateral HRA   Static stand, narrow ASHLEY, EC, 20"x5, B HRA        therapeutic activities for 15 minutes:   Shuttle single limb leg press, R/L LE, 1 black cords, 2x10 each  [place] carries 5# BD x 2 laps around gym   +Step ups 6" step, single HRA 2x10 R/L        gait training for minutes:         Patient Education and Home Exercises     Home Exercises Provided and Patient Education Provided     Education provided:   - posture and ASHLEY when walking    Written Home Exercises Provided: Patient instructed to cont prior HEP. Exercises were reviewed and Jessica was able to demonstrate them prior to the end of the session.  Jessica demonstrated good  understanding of the education provided. See EMR under Patient Instructions for exercises provided during therapy sessions    ASSESSMENT     Pt presents with forward flexed posture,decreased stride length and NBOS with clinic ambulation however is able to improve somewhat with cuing. Continued with functional carry exercises for improved balance when ambulating.      Jessica Is progressing well towards her goals.     Patient prognosis is Good.         Pt will continue to benefit from skilled outpatient physical therapy to address the deficits listed in the problem list box on initial evaluation, provide pt/family education and to maximize pt's level of independence in the home and community environment.     Pt's spiritual, cultural and educational needs considered and pt agreeable to plan of care and goals.       Anticipated Barriers for therapy: fear avoidance, chronicity of current condition, multiple tx areas       Goals:  Short Term Goals: 3 weeks   (1) patient will be I with HOME EXERCISE PROGRAM (initial, not met)   (2) patient will obtain 4+/5 R hip flexion MMT to facilitate improved ability to step over obstacles on ground with improved safety and efficiency " "(initial, not met)   (3) patient will walk X 50 ft while carrying #15 unilaterally to facilitate return to previous level of function for gardening (initial, not met)      Long Term Goals: 6 weeks   (1) patient will complete TUG, no AD, < 10" average to facilitate indicate decreased risk of fall during community mobility tasks (initial, not met)   (2) patient will negotiate uneven terrain while gardening X  100 ft, no loss of balance, to indicate decreased risk of fall (initial, not met)  (3) patient will tolerate walking dog 2 miles/day to facilitate return to previous level of function (initial, not met)       PLAN     Plan of care Certification: 4/27/2022 to 6/27/2022    Focus on balance/proprioception, LE strengthening with emphasis on ADL's/carrying objects.      Outpatient Physical Therapy 2 times weekly for 6 weeks to include the following interventions: Cervical/Lumbar Traction, Electrical Stimulation , re-eval, dry needling , Gait Training, Manual Therapy, Moist Heat/ Ice, Neuromuscular Re-ed, Patient Education, Self Care, Therapeutic Activities and Therapeutic Exercise.      Physical therapist and physical therapy assistant(s) met face to face to discuss patient's treatment plan and progress towards established goals. Pt will be seen by a physical therapist minimally every 6th visit or every 30 days.      Harsha Benitez PTA        "

## 2022-05-20 ENCOUNTER — CLINICAL SUPPORT (OUTPATIENT)
Dept: REHABILITATION | Facility: OTHER | Age: 72
End: 2022-05-20
Payer: MEDICARE

## 2022-05-20 DIAGNOSIS — R26.2 DIFFICULTY WALKING: Primary | ICD-10-CM

## 2022-05-20 PROCEDURE — 97530 THERAPEUTIC ACTIVITIES: CPT | Mod: PN,CQ

## 2022-05-20 PROCEDURE — 97110 THERAPEUTIC EXERCISES: CPT | Mod: PN,CQ

## 2022-05-20 PROCEDURE — 97112 NEUROMUSCULAR REEDUCATION: CPT | Mod: PN,CQ

## 2022-05-20 NOTE — PROGRESS NOTES
[facility]   Physical Therapy Treatment Note     Name: Jessica Floyd  Clinic Number: [id number]    Therapy Diagnosis:   Encounter Diagnosis   Name Primary?    Difficulty walking Yes     Physician: Juan Dunaway DPM    Visit Date: 5/20/2022    Physician Orders: PT Eval and Treat   Medical Diagnosis from Referral: M77.9 (ICD-10-CM) - Capsulitis M24.50 (ICD-10-CM) - Joint contracture M24.573 (ICD-10-CM) - Equinus contracture of ankle   Evaluation Date: 4/27/2022  Authorization Period Expiration: 4/19/2023  Plan of Care Expiration: 6/27/2022  Visit # / Visits authorized: 4/20  FOTO: #2/3 on 5/18/2022     Precautions: Fall       Time In: 0900  Time Out: 0945  Total Billable Time: 45 minutes    SUBJECTIVE     She was compliant with home exercise program.  Response to previous treatment: States she is feeling okay today. She has a MD appointment on Monday for her wrist/hand and her knee. States she would like to leave 15 min early today as her daughter is going to pick her up after PT.   Functional change: n/a    Prior Level of Function: walking dog 2 miles/day twice a day, lifting 3 gallon watering can and walking for gardening   Current Level of Function: walking 6 blocks, negotiating uneven terrain, squatting to lift objects from the ground      Pain:  Current 2/10, worst 10/10, best 1/10   Location: L knee, R ankle/foot   Description: feels more unsteady when rushing and trying to get somewhere quickly   Aggravating Factors: worse with prolonged walking   Easing Factors: use of cane for confidence      Patients goals: build confidence in balance and improve mobility       OBJECTIVE     Objective Measures updated at progress report unless specified.     Treatment     Jessica received the treatments listed below:      therapeutic exercises for 15 minutes:    LAQ 2# 3x10 R/L  Standing HR 3x10  Sit to stand with piliates ring squeeze 3x10     neuromuscular re-education for 15 minutes:    "  Single limb stand 20" x3 R/L, B HRA   Tandem walk, x 5 laps in // , unilateral HRA   Static stand, narrow ASHLEY, EC, 20"x5, B HRA   Standing marching in // 2#, 3x10, R/L     therapeutic activities for 15 minutes:   Shuttle single limb leg press, R/L LE, 1 black cords, 2x10 each  [place] carries 5# BD x 2 laps around gym   Step ups 6" step, single HRA 2x10 R/L        gait training for minutes:         Patient Education and Home Exercises     Home Exercises Provided and Patient Education Provided     Education provided:   - posture and ASHLEY when walking    Written Home Exercises Provided: Patient instructed to cont prior HEP. Exercises were reviewed and Jessica was able to demonstrate them prior to the end of the session.  Jessica demonstrated good  understanding of the education provided. See EMR under Patient Instructions for exercises provided during therapy sessions    ASSESSMENT     Pt demonstrated slightly improved eccentric control with functional step up this visit. Pt was not able to complete full Tx this visit secondary to pt time constraints. Will continue to progress balance/functional there-ex as tolerated.      Jessica Is progressing well towards her goals.     Patient prognosis is Good.         Pt will continue to benefit from skilled outpatient physical therapy to address the deficits listed in the problem list box on initial evaluation, provide pt/family education and to maximize pt's level of independence in the home and community environment.     Pt's spiritual, cultural and educational needs considered and pt agreeable to plan of care and goals.       Anticipated Barriers for therapy: fear avoidance, chronicity of current condition, multiple tx areas       Goals:  Short Term Goals: 3 weeks   (1) patient will be I with HOME EXERCISE PROGRAM (initial, not met)   (2) patient will obtain 4+/5 R hip flexion MMT to facilitate improved ability to step over obstacles on ground with improved safety and " "efficiency (initial, not met)   (3) patient will walk X 50 ft while carrying #15 unilaterally to facilitate return to previous level of function for gardening (initial, not met)      Long Term Goals: 6 weeks   (1) patient will complete TUG, no AD, < 10" average to facilitate indicate decreased risk of fall during community mobility tasks (initial, not met)   (2) patient will negotiate uneven terrain while gardening X  100 ft, no loss of balance, to indicate decreased risk of fall (initial, not met)  (3) patient will tolerate walking dog 2 miles/day to facilitate return to previous level of function (initial, not met)       PLAN     Plan of care Certification: 4/27/2022 to 6/27/2022    Focus on balance/proprioception, LE strengthening with emphasis on ADL's/carrying objects.      Outpatient Physical Therapy 2 times weekly for 6 weeks to include the following interventions: Cervical/Lumbar Traction, Electrical Stimulation , re-eval, dry needling , Gait Training, Manual Therapy, Moist Heat/ Ice, Neuromuscular Re-ed, Patient Education, Self Care, Therapeutic Activities and Therapeutic Exercise.      Physical therapist and physical therapy assistant(s) met face to face to discuss patient's treatment plan and progress towards established goals. Pt will be seen by a physical therapist minimally every 6th visit or every 30 days.      Harsha Benitez PTA        "

## 2022-05-23 ENCOUNTER — OFFICE VISIT (OUTPATIENT)
Dept: INTERNAL MEDICINE | Facility: CLINIC | Age: 72
End: 2022-05-23
Payer: MEDICARE

## 2022-05-23 VITALS
DIASTOLIC BLOOD PRESSURE: 95 MMHG | BODY MASS INDEX: 24.62 KG/M2 | OXYGEN SATURATION: 99 % | HEIGHT: 64 IN | WEIGHT: 144.19 LBS | SYSTOLIC BLOOD PRESSURE: 135 MMHG | HEART RATE: 76 BPM

## 2022-05-23 DIAGNOSIS — J06.9 VIRAL UPPER RESPIRATORY TRACT INFECTION: ICD-10-CM

## 2022-05-23 DIAGNOSIS — I25.10 CORONARY ARTERY DISEASE INVOLVING NATIVE CORONARY ARTERY OF NATIVE HEART WITHOUT ANGINA PECTORIS: ICD-10-CM

## 2022-05-23 DIAGNOSIS — Z00.00 ENCOUNTER FOR PREVENTIVE HEALTH EXAMINATION: Primary | ICD-10-CM

## 2022-05-23 DIAGNOSIS — I65.23 BILATERAL CAROTID ARTERY STENOSIS: ICD-10-CM

## 2022-05-23 DIAGNOSIS — K21.9 GASTROESOPHAGEAL REFLUX DISEASE WITHOUT ESOPHAGITIS: ICD-10-CM

## 2022-05-23 DIAGNOSIS — I60.9 SAH (SUBARACHNOID HEMORRHAGE): ICD-10-CM

## 2022-05-23 DIAGNOSIS — R26.2 DIFFICULTY WALKING: ICD-10-CM

## 2022-05-23 DIAGNOSIS — J30.1 ALLERGIC RHINITIS DUE TO POLLEN, UNSPECIFIED SEASONALITY: ICD-10-CM

## 2022-05-23 DIAGNOSIS — N18.31 STAGE 3A CHRONIC KIDNEY DISEASE: ICD-10-CM

## 2022-05-23 DIAGNOSIS — E78.5 DYSLIPIDEMIA: ICD-10-CM

## 2022-05-23 DIAGNOSIS — I10 PRIMARY HYPERTENSION: ICD-10-CM

## 2022-05-23 DIAGNOSIS — E05.90 SUBCLINICAL HYPERTHYROIDISM: ICD-10-CM

## 2022-05-23 PROCEDURE — 1160F PR REVIEW ALL MEDS BY PRESCRIBER/CLIN PHARMACIST DOCUMENTED: ICD-10-PCS | Mod: CPTII,S$GLB,, | Performed by: NURSE PRACTITIONER

## 2022-05-23 PROCEDURE — 3008F BODY MASS INDEX DOCD: CPT | Mod: CPTII,S$GLB,, | Performed by: NURSE PRACTITIONER

## 2022-05-23 PROCEDURE — 3008F PR BODY MASS INDEX (BMI) DOCUMENTED: ICD-10-PCS | Mod: CPTII,S$GLB,, | Performed by: NURSE PRACTITIONER

## 2022-05-23 PROCEDURE — 3075F SYST BP GE 130 - 139MM HG: CPT | Mod: CPTII,S$GLB,, | Performed by: NURSE PRACTITIONER

## 2022-05-23 PROCEDURE — 3288F FALL RISK ASSESSMENT DOCD: CPT | Mod: CPTII,S$GLB,, | Performed by: NURSE PRACTITIONER

## 2022-05-23 PROCEDURE — 1160F RVW MEDS BY RX/DR IN RCRD: CPT | Mod: CPTII,S$GLB,, | Performed by: NURSE PRACTITIONER

## 2022-05-23 PROCEDURE — 1170F PR FUNCTIONAL STATUS ASSESSED: ICD-10-PCS | Mod: CPTII,S$GLB,, | Performed by: NURSE PRACTITIONER

## 2022-05-23 PROCEDURE — G0439 PPPS, SUBSEQ VISIT: HCPCS | Mod: S$GLB,,, | Performed by: NURSE PRACTITIONER

## 2022-05-23 PROCEDURE — 3080F PR MOST RECENT DIASTOLIC BLOOD PRESSURE >= 90 MM HG: ICD-10-PCS | Mod: CPTII,S$GLB,, | Performed by: NURSE PRACTITIONER

## 2022-05-23 PROCEDURE — 3044F PR MOST RECENT HEMOGLOBIN A1C LEVEL <7.0%: ICD-10-PCS | Mod: CPTII,S$GLB,, | Performed by: NURSE PRACTITIONER

## 2022-05-23 PROCEDURE — 99499 UNLISTED E&M SERVICE: CPT | Mod: ,,, | Performed by: NURSE PRACTITIONER

## 2022-05-23 PROCEDURE — 3044F HG A1C LEVEL LT 7.0%: CPT | Mod: CPTII,S$GLB,, | Performed by: NURSE PRACTITIONER

## 2022-05-23 PROCEDURE — 99499 RISK ADDL DX/OHS AUDIT: ICD-10-PCS | Mod: ,,, | Performed by: NURSE PRACTITIONER

## 2022-05-23 PROCEDURE — 99999 PR PBB SHADOW E&M-EST. PATIENT-LVL IV: CPT | Mod: PBBFAC,,, | Performed by: NURSE PRACTITIONER

## 2022-05-23 PROCEDURE — 3080F DIAST BP >= 90 MM HG: CPT | Mod: CPTII,S$GLB,, | Performed by: NURSE PRACTITIONER

## 2022-05-23 PROCEDURE — 3288F PR FALLS RISK ASSESSMENT DOCUMENTED: ICD-10-PCS | Mod: CPTII,S$GLB,, | Performed by: NURSE PRACTITIONER

## 2022-05-23 PROCEDURE — G0439 PR MEDICARE ANNUAL WELLNESS SUBSEQUENT VISIT: ICD-10-PCS | Mod: S$GLB,,, | Performed by: NURSE PRACTITIONER

## 2022-05-23 PROCEDURE — 1101F PT FALLS ASSESS-DOCD LE1/YR: CPT | Mod: CPTII,S$GLB,, | Performed by: NURSE PRACTITIONER

## 2022-05-23 PROCEDURE — 1101F PR PT FALLS ASSESS DOC 0-1 FALLS W/OUT INJ PAST YR: ICD-10-PCS | Mod: CPTII,S$GLB,, | Performed by: NURSE PRACTITIONER

## 2022-05-23 PROCEDURE — 3075F PR MOST RECENT SYSTOLIC BLOOD PRESS GE 130-139MM HG: ICD-10-PCS | Mod: CPTII,S$GLB,, | Performed by: NURSE PRACTITIONER

## 2022-05-23 PROCEDURE — 99999 PR PBB SHADOW E&M-EST. PATIENT-LVL IV: ICD-10-PCS | Mod: PBBFAC,,, | Performed by: NURSE PRACTITIONER

## 2022-05-23 PROCEDURE — 1170F FXNL STATUS ASSESSED: CPT | Mod: CPTII,S$GLB,, | Performed by: NURSE PRACTITIONER

## 2022-05-23 PROCEDURE — 1159F PR MEDICATION LIST DOCUMENTED IN MEDICAL RECORD: ICD-10-PCS | Mod: CPTII,S$GLB,, | Performed by: NURSE PRACTITIONER

## 2022-05-23 PROCEDURE — 1159F MED LIST DOCD IN RCRD: CPT | Mod: CPTII,S$GLB,, | Performed by: NURSE PRACTITIONER

## 2022-05-23 NOTE — PATIENT INSTRUCTIONS
Counseling and Referral of Other Preventative  (Italic type indicates deductible and co-insurance are waived)    Patient Name: Jessica Floyd  Today's Date: 5/23/2022    Health Maintenance       Date Due Completion Date    Aspirin/Antiplatelet Therapy Never done ---    DEXA Scan 02/09/2019 2/9/2016 (Done)    Override on 2/9/2016: Done    Mammogram 10/19/2020 10/19/2018    Override on 2/9/2016: Done    Shingles Vaccine (2 of 2) 12/03/2021 10/8/2021    COVID-19 Vaccine (5 - Booster for Pfizer series) 08/20/2022 4/20/2022    High Dose Statin 05/13/2023 5/13/2022    Colorectal Cancer Screening 02/09/2026 2/9/2016 (Done)    Override on 2/9/2016: Done    Lipid Panel 04/05/2027 4/5/2022    Override on 6/7/2016: Done    TETANUS VACCINE 08/04/2027 8/4/2017        No orders of the defined types were placed in this encounter.    The following information is provided to all patients.  This information is to help you find resources for any of the problems found today that may be affecting your health:                Living healthy guide: www.[facility].[place].gov      Understanding Diabetes: www.diabetes.org      Eating healthy: www.cdc.gov/healthyweight      CDC home safety checklist: www.cdc.gov/steadi/patient.html      Agency on Aging: www.goea.[place].[facility]      Alcoholics anonymous (AA): www.aa.org      Physical Activity: www.[place].nih.gov/nk1ovfa      Tobacco use: www.quitwithusla.org

## 2022-05-23 NOTE — PROGRESS NOTES
"  Jessica Floyd presented for a  Medicare AWV and comprehensive Health Risk Assessment today. The following components were reviewed and updated:    · Medical history  · Family History  · Social history  · Allergies and Current Medications  · Health Risk Assessment  · Health Maintenance  · Care Team         ** See Completed Assessments for Annual Wellness Visit within the encounter summary.**         The following assessments were completed:  · Living Situation  · CAGE  · Depression Screening  · Timed Get Up and Go  · Whisper Test  · Cognitive Function Screening  · Nutrition Screening  · ADL Screening  · PAQ Screening            Vitals:    05/23/22 1032   BP: (!) 135/95   BP Location: Left arm   Patient Position: Sitting   BP Method: Medium (Manual)   Pulse: 76   SpO2: 99%   Weight: 65.4 kg (144 lb 2.9 oz)   Height: 5' 4" (1.626 m)     Body mass index is 24.75 kg/m².     Physical Exam  Vitals reviewed.   Constitutional:       Appearance: She is well-developed.   HENT:      Head: Normocephalic and atraumatic. Not macrocephalic and not microcephalic. No raccoon eyes, Gee's sign, abrasion, contusion, right periorbital erythema, left periorbital erythema or laceration. Hair is normal.      Right Ear: No decreased hearing noted. No laceration, drainage, swelling or tenderness. No middle ear effusion. No foreign body. No mastoid tenderness. No hemotympanum. Tympanic membrane is not injected, scarred, perforated, erythematous, retracted or bulging. Tympanic membrane has normal mobility.      Left Ear: No decreased hearing noted. No laceration, drainage, swelling or tenderness.  No middle ear effusion. No foreign body. No mastoid tenderness. No hemotympanum. Tympanic membrane is not injected, scarred, perforated, erythematous, retracted or bulging. Tympanic membrane has normal mobility.      Nose: Nose normal. No nasal deformity, laceration or mucosal edema.      Mouth/Throat:      Pharynx: Uvula midline.   Eyes:      " General: Lids are normal. No scleral icterus.     Conjunctiva/sclera: Conjunctivae normal.   Neck:      Thyroid: No thyroid mass or thyromegaly.      Trachea: Trachea normal.   Cardiovascular:      Rate and Rhythm: Normal rate and regular rhythm.   Pulmonary:      Effort: Pulmonary effort is normal. No respiratory distress.      Breath sounds: Normal breath sounds.   Abdominal:      Palpations: Abdomen is soft.   Musculoskeletal:         General: Normal range of motion.      Cervical back: Neck supple. No edema or erythema. No spinous process tenderness or muscular tenderness. Normal range of motion.      Comments: Uses a cane   Lymphadenopathy:      Head:      Right side of head: No submental, submandibular, tonsillar, preauricular or posterior auricular adenopathy.      Left side of head: No submental, submandibular, tonsillar, preauricular, posterior auricular or occipital adenopathy.   Skin:     General: Skin is warm and dry.   Neurological:      Mental Status: She is alert and oriented to person, place, and time.   Psychiatric:         Behavior: Behavior normal.         Thought Content: Thought content normal.         Judgment: Judgment normal.             Diagnoses and health risks identified today and associated recommendations/orders:    1. Encounter for preventive health examination  Annual Health Risk Assessment (HRA) visit today.  Counseling and referral of health maintenance and preventative health measures performed.  Patient given annual wellness paperwork to take home.  Encouraged to return in 1 year for subsequent HRA visit.     2. Stage 3a chronic kidney disease  Chronic. Stable. Continue current treatment plan as previously prescribed by PCP.    3. Coronary artery disease involving native coronary artery of native heart without angina pectoris  Chronic. Stable. Continue current treatment plan as previously prescribed by PCP.    4. Primary hypertension  Chronic. Stable. Controlled. Encouraged to  increase exercise as tolerated (moderate-intensity aerobic activity and muscle-strengthening activities) improve diet to heart healthy, low sodium diet. Continue current treatment plan as previously prescribed by PCP.    5. Bilateral carotid artery stenosis  Chronic. Stable. Continue current treatment plan as previously prescribed by PCP.    6. Dyslipidemia  Chronic. Stable. Continue current treatment plan as previously prescribed by PCP.    7. Allergic rhinitis due to pollen, unspecified seasonality  Chronic. Stable. Continue current treatment plan as previously prescribed by PCP.    8. Viral upper respiratory tract infection  Chronic. Stable. Continue current treatment plan as previously prescribed by PCP.    9. SAH (subarachnoid hemorrhage)  Chronic. Stable. Continue current treatment plan as previously prescribed by PCP.    10. Subclinical hyperthyroidism  Chronic. Stable. Continue current treatment plan as previously prescribed by PCP.    11. Gastroesophageal reflux disease without esophagitis  Chronic. Stable. Continue current treatment plan as previously prescribed by PCP.    12. Difficulty walking  Chronic. Stable. Continue current treatment plan as previously prescribed by PCP.        Provided Jessica with a 5-10 year written screening schedule and personal prevention plan. Recommendations were developed using the USPSTF age appropriate recommendations. Education, counseling, and referrals were provided as needed. After Visit Summary printed and given to patient which includes a list of additional screenings\tests needed.      I offered to discuss end of life issues, including information on how to make advance directives that the patient could use to name someone who would make medical decisions on their behalf if they became too ill to make themselves.    ___Patient declined  _X_Patient is interested, I provided paper work and offered to discuss.    Follow up in about 1 year (around 5/23/2023).    Gustavo VALDOVINOS  PAXTON Harris  I offered to discuss advanced care planning, including how to pick a person who would make decisions for you if you were unable to make them for yourself, called a health care power of , and what kind of decisions you might make such as use of life sustaining treatments such as ventilators and tube feeding when faced with a life limiting illness recorded on a living will that they will need to know. (How you want to be cared for as you near the end of your natural life)     X Patient is interested in learning more about how to make advanced directives.  I provided them paperwork and offered to discuss this with them.

## 2022-05-25 NOTE — PROGRESS NOTES
Subjective:       Patient ID: Jessica Floyd is a 71 y.o. female who  has a past medical history of Allergic rhinitis, Carotid stenosis, Coronary atherosclerosis, Dyslipidemia, ESBL (extended spectrum beta-lactamase) producing bacteria infection, Hypertension, Nausea and vomiting (05/26/2017), and Subarachnoid hemorrhage due to ruptured aneurysm (1999).    Chief Complaint: Annual Exam, Wrist Pain, and Knee Pain    Wrist Pain   Associated symptoms include a fever.   Knee Pain     complaints of pain in her joints chintan her hands /fingers. Did test for rheumatoid and was neg. But, pt in great deal of pain and wants to know her options  Pt is anxious today since  is en route to [place] but lots of issues at airport with requirements for travel, now he has left and pt feels better but thinks this why pressures are up    History was obtained from the patient and supplemented through chart review.    She is retired. 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    Met patient during UC visit and has since switched to my care from Dr Carter    She does report sinus symptoms with itchy, watery eyes when she is outside and when she goes to bed at night. She owns [employer] carpets, but has none in her bedroom.  She quit smoking 20 years ago.  She denies recent travel.  She also takes a PPI qAM.      She has a h/o non-obstructive CAD on outside cath in [facility].  She does follow with cardiology.  Her last TTE was 5/2017 with normal EF and no wall motion abnormalities.  Her myocardium perfusion study was normal.  She is taking ASA, statin, BB  She is taking Norvasc, Coreg, HCTZ for HTN.  She quit smoking 17 years ago.    Review of Systems   Constitutional: Positive for fever. Negative for chills.   HENT: Positive for congestion, postnasal drip, rhinorrhea and sinus pressure.    Eyes: Positive for itching. Negative for discharge and redness.   Respiratory: Negative for chest tightness, shortness of breath and  "wheezing.    Cardiovascular: Negative for chest pain and leg swelling.   Gastrointestinal: Negative for abdominal pain, blood in stool, constipation, diarrhea and vomiting.   Genitourinary: Negative for dysuria and hematuria.   Musculoskeletal: Negative for gait problem and joint swelling.   Skin: Negative for color change and rash.   Neurological: Negative for weakness and headaches.   Hematological: Negative for adenopathy.   Psychiatric/Behavioral: Negative for self-injury and suicidal ideas.       Past Medical History:   Diagnosis Date    Allergic rhinitis     Carotid stenosis     Coronary atherosclerosis     Outside [facility] 2014: 20% mLAD, 50% mCx, 20%, mRCA    Dyslipidemia     ESBL (extended spectrum beta-lactamase) producing bacteria infection     UTI    Hypertension     Nausea and vomiting 2017    Subarachnoid hemorrhage due to ruptured aneurysm      Past Surgical History:   Procedure Laterality Date    ADENOIDECTOMY      ANTERIOR CRUCIATE LIGAMENT REPAIR      APPENDECTOMY      CEREBRAL ANEURYSM REPAIR      clip     SECTION      DENTAL SURGERY      TONSILLECTOMY       Family History   Problem Relation Age of Onset    Hypertension Mother     Aneurysm Mother     Hypertension Father     Alcohol abuse Father     Kidney disease Brother     Heart disease Brother     Gout Brother     No Known Problems Daughter     No Known Problems Daughter     No Known Problems Daughter       is going to join [facility] to oversee [place]  Objective:      Vitals:    22 1043 22 1144   BP: (!) 157/85 135/77   BP Location: Left arm    Patient Position: Sitting    BP Method: Medium (Automatic)    Pulse: 71 73   SpO2: 96%    Weight: 66.2 kg (145 lb 13.4 oz)    Height: 5' 4" (1.626 m)       Physical Exam  Constitutional:       General: She is not in acute distress.     Appearance: She is well-developed. She is not diaphoretic.   HENT:      Head: Normocephalic and " atraumatic.      Nose: No mucosal edema or rhinorrhea.      Right Sinus: Maxillary sinus tenderness present. No frontal sinus tenderness.      Left Sinus: Maxillary sinus tenderness present. No frontal sinus tenderness.      Mouth/Throat:      Pharynx: Uvula midline. Posterior oropharyngeal erythema present. No oropharyngeal exudate.   Eyes:      General: No scleral icterus.        Right eye: No discharge.         Left eye: No discharge.      Conjunctiva/sclera:      Right eye: Right conjunctiva is injected.      Left eye: Left conjunctiva is injected.      Pupils: Pupils are equal, round, and reactive to light.   Neck:      Thyroid: No thyromegaly.      Trachea: No tracheal deviation.   Cardiovascular:      Rate and Rhythm: Normal rate and regular rhythm.      Heart sounds: Normal heart sounds. No murmur heard.  Pulmonary:      Effort: Pulmonary effort is normal. No respiratory distress.      Breath sounds: Normal breath sounds. No stridor. No wheezing.   Abdominal:      General: Bowel sounds are normal.      Palpations: Abdomen is soft.      Tenderness: There is no abdominal tenderness.   Musculoskeletal:         General: No deformity.      Cervical back: Neck supple.   Lymphadenopathy:      Cervical: No cervical adenopathy.   Skin:     General: Skin is warm and dry.      Capillary Refill: Capillary refill takes less than 2 seconds.      Findings: No erythema.   Neurological:      Mental Status: She is alert and oriented to person, place, and time.      Coordination: Coordination normal.   Psychiatric:         Behavior: Behavior normal.           Lab Results   Component Value Date    WBC 7.31 05/11/2022    HGB 13.6 05/11/2022    HCT 40.0 05/11/2022     05/11/2022    CHOL 176 04/05/2022    TRIG 88 04/05/2022    HDL 69 04/05/2022    ALT 20 04/05/2022    AST 27 04/05/2022     04/05/2022    K 3.2 (L) 04/05/2022    CL 95 04/05/2022    CREATININE 1.1 04/05/2022    BUN 19 04/05/2022    CO2 31 (H) 04/05/2022     TSH 1.326 05/11/2022    HGBA1C 5.4 05/11/2022       Assessment:       1. Arthralgia, unspecified joint    2. Vitamin D deficiency    3. Subclinical hyperthyroidism    4. Primary hypertension    5. Dyslipidemia    6. Bilateral carotid artery stenosis    7. Post-menopausal    8. Gastroesophageal reflux disease without esophagitis    9. Stage 3a chronic kidney disease    10. Pain of right hand    11. Abnormal blood finding          Plan:       Jessica was seen today for annual exam, wrist pain and knee pain.    Diagnoses and all orders for this visit:    Arthralgia, unspecified joint  -     Ambulatory referral/consult to Rheumatology; Future    Vitamin D deficiency  -     Vitamin D; Future    Subclinical hyperthyroidism  -     CBC Auto Differential; Future  -     Cancel: Hemoglobin A1C; Future  -     TSH; Future    Primary hypertension  -     CBC Auto Differential; Future  -     Cancel: Hemoglobin A1C; Future  -     TSH; Future    Dyslipidemia  -     CBC Auto Differential; Future  -     Cancel: Hemoglobin A1C; Future  -     TSH; Future    Bilateral carotid artery stenosis  -     CBC Auto Differential; Future  -     Cancel: Hemoglobin A1C; Future  -     TSH; Future    Post-menopausal  -     DXA Bone Density Spine And Hip; Future    Gastroesophageal reflux disease without esophagitis    Stage 3a chronic kidney disease    Pain of right hand  -     Rheumatoid Factor; Future    Abnormal blood finding  -     Hemoglobin A1C; Future       HTN controlled on repeat BP read    RTC 6 mos

## 2022-05-27 ENCOUNTER — CLINICAL SUPPORT (OUTPATIENT)
Dept: REHABILITATION | Facility: OTHER | Age: 72
End: 2022-05-27
Payer: MEDICARE

## 2022-05-27 DIAGNOSIS — R26.2 DIFFICULTY WALKING: Primary | ICD-10-CM

## 2022-05-27 PROCEDURE — 97112 NEUROMUSCULAR REEDUCATION: CPT | Mod: PN,CQ

## 2022-05-27 PROCEDURE — 97530 THERAPEUTIC ACTIVITIES: CPT | Mod: PN,CQ

## 2022-05-27 PROCEDURE — 97110 THERAPEUTIC EXERCISES: CPT | Mod: PN,CQ

## 2022-05-27 NOTE — PROGRESS NOTES
[facility]   Physical Therapy Treatment Note     Name: Jessica Floyd  Clinic Number: [id number]    Therapy Diagnosis:   Encounter Diagnosis   Name Primary?    Difficulty walking Yes     Physician: Juan Dunaway DPM    Visit Date: 5/27/2022    Physician Orders: PT Eval and Treat   Medical Diagnosis from Referral: M77.9 (ICD-10-CM) - Capsulitis M24.50 (ICD-10-CM) - Joint contracture M24.573 (ICD-10-CM) - Equinus contracture of ankle   Evaluation Date: 4/27/2022  Authorization Period Expiration: 4/19/2023  Plan of Care Expiration: 6/27/2022  Visit # / Visits authorized: 4/20  FOTO: #2/3 on 5/18/2022     Precautions: Fall       Time In: 0800  Time Out: 0900  Total Billable Time: 60 minutes    SUBJECTIVE     She was compliant with home exercise program.  Response to previous treatment: States she has a pretty bad wound on her R foot and it was bleeding pretty bad. States she does not want to do many standing exercises today as it hurts to be on her feet.   Functional change: she is walking faster, with improved balance.    Prior Level of Function: walking dog 2 miles/day twice a day, lifting 3 gallon watering can and walking for gardening   Current Level of Function: walking 6 blocks, negotiating uneven terrain, squatting to lift objects from the ground      Pain:  Current 4/10, worst 10/10, best 1/10   Location: L knee, R ankle/foot   Description: feels more unsteady when rushing and trying to get somewhere quickly   Aggravating Factors: worse with prolonged walking   Easing Factors: use of cane for confidence      Patients goals: build confidence in balance and improve mobility       OBJECTIVE     Objective Measures updated at progress report unless specified.     Treatment     Jessica received the treatments listed below:      therapeutic exercises for 30 minutes:    +Bridging 30x  +Clams 30x R/L  +LTR on red ball x3'  LAQ 2# 3x10 R/L  Standing HR in // , no ue use, 30x - held today 2/2  "foot pain  Sit to stand with piliates ring squeeze 3x10     neuromuscular re-education for 15 minutes:     Single limb stand 20" x3 R/L, B HRA - held today 2/2 foot pain  Tandem walk, x 5 laps in // , unilateral HRA   Static stand, narrow ASHLEY, EC, 20"x5, B HRA   Standing marching in // 2#, 3x10, R/L - held today 2/2 foot pain     therapeutic activities for 15 minutes:   Shuttle single limb leg press, R/L LE, 1 black cords, 2x10 each - held today 2/2 foot pain  [place] carries 5# BD x 2 laps around gym   Step ups 6" step, single HRA 2x10 R/L        gait training for minutes:         Patient Education and Home Exercises     Home Exercises Provided and Patient Education Provided     Education provided:   -Exercise form and rationale     Written Home Exercises Provided: Patient instructed to cont prior HEP. Exercises were reviewed and Jessica was able to demonstrate them prior to the end of the session.  Jessica demonstrated good  understanding of the education provided. See EMR under Patient Instructions for exercises provided during therapy sessions    ASSESSMENT     SLS/execises were held this visit secondary pt request/reproted pain. Pt appeared to have a bandage on her R foot noted through her slip on shoe. Pt  ambulated with antalgic gait. Endurance continues to be low with CKC exercises as moderate perceived effort is present. Eccentrics were slightly improved with functional step ups today.Pt will be reassessed by supervising PT next visit. Will continue to progress balance/functional there-ex as tolerated under PT guidance.      Jessica Is progressing well towards her goals.     Patient prognosis is Good.         Pt will continue to benefit from skilled outpatient physical therapy to address the deficits listed in the problem list box on initial evaluation, provide pt/family education and to maximize pt's level of independence in the home and community environment.     Pt's spiritual, cultural and educational " "needs considered and pt agreeable to plan of care and goals.       Anticipated Barriers for therapy: fear avoidance, chronicity of current condition, multiple tx areas       Goals:  Short Term Goals: 3 weeks   (1) patient will be I with HOME EXERCISE PROGRAM (initial, not met)   (2) patient will obtain 4+/5 R hip flexion MMT to facilitate improved ability to step over obstacles on ground with improved safety and efficiency (initial, not met)   (3) patient will walk X 50 ft while carrying #15 unilaterally to facilitate return to previous level of function for gardening (initial, not met)      Long Term Goals: 6 weeks   (1) patient will complete TUG, no AD, < 10" average to facilitate indicate decreased risk of fall during community mobility tasks (initial, not met)   (2) patient will negotiate uneven terrain while gardening X  100 ft, no loss of balance, to indicate decreased risk of fall (initial, not met)  (3) patient will tolerate walking dog 2 miles/day to facilitate return to previous level of function (initial, not met)       PLAN     Plan of care Certification: 4/27/2022 to 6/27/2022    Focus on balance/proprioception, LE strengthening with emphasis on ADL's/carrying objects.      Outpatient Physical Therapy 2 times weekly for 6 weeks to include the following interventions: Cervical/Lumbar Traction, Electrical Stimulation , re-eval, dry needling , Gait Training, Manual Therapy, Moist Heat/ Ice, Neuromuscular Re-ed, Patient Education, Self Care, Therapeutic Activities and Therapeutic Exercise.      Physical therapist and physical therapy assistant(s) met face to face to discuss patient's treatment plan and progress towards established goals. Pt will be seen by a physical therapist minimally every 6th visit or every 30 days.      Harsha Benitez, PTA        "

## 2022-05-30 ENCOUNTER — PATIENT MESSAGE (OUTPATIENT)
Dept: ADMINISTRATIVE | Facility: HOSPITAL | Age: 72
End: 2022-05-30
Payer: MEDICARE

## 2022-06-01 ENCOUNTER — OFFICE VISIT (OUTPATIENT)
Dept: PODIATRY | Facility: CLINIC | Age: 72
End: 2022-06-01
Payer: MEDICARE

## 2022-06-01 ENCOUNTER — APPOINTMENT (OUTPATIENT)
Dept: RADIOLOGY | Facility: OTHER | Age: 72
End: 2022-06-01
Attending: PODIATRIST
Payer: MEDICARE

## 2022-06-01 VITALS
BODY MASS INDEX: 24.59 KG/M2 | SYSTOLIC BLOOD PRESSURE: 155 MMHG | WEIGHT: 144 LBS | HEART RATE: 101 BPM | HEIGHT: 64 IN | DIASTOLIC BLOOD PRESSURE: 88 MMHG

## 2022-06-01 DIAGNOSIS — M79.671 FOOT PAIN, RIGHT: Primary | ICD-10-CM

## 2022-06-01 DIAGNOSIS — M10.9 ACUTE GOUT OF RIGHT FOOT, UNSPECIFIED CAUSE: ICD-10-CM

## 2022-06-01 DIAGNOSIS — M79.671 FOOT PAIN, RIGHT: ICD-10-CM

## 2022-06-01 PROCEDURE — 73630 XR FOOT COMPLETE 3 VIEW RIGHT: ICD-10-PCS | Mod: 26,RT,, | Performed by: RADIOLOGY

## 2022-06-01 PROCEDURE — 73630 X-RAY EXAM OF FOOT: CPT | Mod: TC,RT

## 2022-06-01 PROCEDURE — 1159F MED LIST DOCD IN RCRD: CPT | Mod: CPTII,S$GLB,, | Performed by: PODIATRIST

## 2022-06-01 PROCEDURE — 3288F PR FALLS RISK ASSESSMENT DOCUMENTED: ICD-10-PCS | Mod: CPTII,S$GLB,, | Performed by: PODIATRIST

## 2022-06-01 PROCEDURE — 99999 PR PBB SHADOW E&M-EST. PATIENT-LVL IV: CPT | Mod: PBBFAC,,, | Performed by: PODIATRIST

## 2022-06-01 PROCEDURE — 99214 OFFICE O/P EST MOD 30 MIN: CPT | Mod: S$GLB,,, | Performed by: PODIATRIST

## 2022-06-01 PROCEDURE — 99214 PR OFFICE/OUTPT VISIT, EST, LEVL IV, 30-39 MIN: ICD-10-PCS | Mod: S$GLB,,, | Performed by: PODIATRIST

## 2022-06-01 PROCEDURE — 3044F PR MOST RECENT HEMOGLOBIN A1C LEVEL <7.0%: ICD-10-PCS | Mod: CPTII,S$GLB,, | Performed by: PODIATRIST

## 2022-06-01 PROCEDURE — 3077F SYST BP >= 140 MM HG: CPT | Mod: CPTII,S$GLB,, | Performed by: PODIATRIST

## 2022-06-01 PROCEDURE — 3288F FALL RISK ASSESSMENT DOCD: CPT | Mod: CPTII,S$GLB,, | Performed by: PODIATRIST

## 2022-06-01 PROCEDURE — 1160F PR REVIEW ALL MEDS BY PRESCRIBER/CLIN PHARMACIST DOCUMENTED: ICD-10-PCS | Mod: CPTII,S$GLB,, | Performed by: PODIATRIST

## 2022-06-01 PROCEDURE — 1160F RVW MEDS BY RX/DR IN RCRD: CPT | Mod: CPTII,S$GLB,, | Performed by: PODIATRIST

## 2022-06-01 PROCEDURE — 3079F DIAST BP 80-89 MM HG: CPT | Mod: CPTII,S$GLB,, | Performed by: PODIATRIST

## 2022-06-01 PROCEDURE — 3079F PR MOST RECENT DIASTOLIC BLOOD PRESSURE 80-89 MM HG: ICD-10-PCS | Mod: CPTII,S$GLB,, | Performed by: PODIATRIST

## 2022-06-01 PROCEDURE — 99999 PR PBB SHADOW E&M-EST. PATIENT-LVL IV: ICD-10-PCS | Mod: PBBFAC,,, | Performed by: PODIATRIST

## 2022-06-01 PROCEDURE — 3044F HG A1C LEVEL LT 7.0%: CPT | Mod: CPTII,S$GLB,, | Performed by: PODIATRIST

## 2022-06-01 PROCEDURE — 1101F PR PT FALLS ASSESS DOC 0-1 FALLS W/OUT INJ PAST YR: ICD-10-PCS | Mod: CPTII,S$GLB,, | Performed by: PODIATRIST

## 2022-06-01 PROCEDURE — 3008F PR BODY MASS INDEX (BMI) DOCUMENTED: ICD-10-PCS | Mod: CPTII,S$GLB,, | Performed by: PODIATRIST

## 2022-06-01 PROCEDURE — 1125F PR PAIN SEVERITY QUANTIFIED, PAIN PRESENT: ICD-10-PCS | Mod: CPTII,S$GLB,, | Performed by: PODIATRIST

## 2022-06-01 PROCEDURE — 3077F PR MOST RECENT SYSTOLIC BLOOD PRESSURE >= 140 MM HG: ICD-10-PCS | Mod: CPTII,S$GLB,, | Performed by: PODIATRIST

## 2022-06-01 PROCEDURE — 1159F PR MEDICATION LIST DOCUMENTED IN MEDICAL RECORD: ICD-10-PCS | Mod: CPTII,S$GLB,, | Performed by: PODIATRIST

## 2022-06-01 PROCEDURE — 73630 X-RAY EXAM OF FOOT: CPT | Mod: 26,RT,, | Performed by: RADIOLOGY

## 2022-06-01 PROCEDURE — 3008F BODY MASS INDEX DOCD: CPT | Mod: CPTII,S$GLB,, | Performed by: PODIATRIST

## 2022-06-01 PROCEDURE — 1101F PT FALLS ASSESS-DOCD LE1/YR: CPT | Mod: CPTII,S$GLB,, | Performed by: PODIATRIST

## 2022-06-01 PROCEDURE — 1125F AMNT PAIN NOTED PAIN PRSNT: CPT | Mod: CPTII,S$GLB,, | Performed by: PODIATRIST

## 2022-06-01 RX ORDER — METHYLPREDNISOLONE 4 MG/1
4 TABLET ORAL DAILY
Qty: 1 TABLET | Refills: 0 | Status: SHIPPED | OUTPATIENT
Start: 2022-06-01 | End: 2022-06-15

## 2022-06-01 NOTE — PROGRESS NOTES
Chief Complaint   Patient presents with    Foot Pain     R foot           HPI:   Jessica Floyd is a 71 y.o. female who presents to clinic with complaints of painful right 1st metatarso-phalangeal joint that started this weekend.   The bunion area is red and swollen.     No trauma.   Treatment:  Icing the area and OTC NSAIDs.         Patient Active Problem List   Diagnosis    Coronary artery disease involving native coronary artery of native heart without angina pectoris    SAH (subarachnoid hemorrhage)    Dyslipidemia    Bilateral carotid artery stenosis    Gastroesophageal reflux disease without esophagitis    Stage 3a chronic kidney disease    Hypertension    Subclinical hyperthyroidism    Viral upper respiratory tract infection    Allergic rhinitis    Difficulty walking           Current Outpatient Medications on File Prior to Visit   Medication Sig Dispense Refill    amLODIPine (NORVASC) 10 MG tablet TAKE 1 TABLET BY MOUTH EVERY DAY 90 tablet 3    atorvastatin (LIPITOR) 80 MG tablet TAKE 1 TABLET BY MOUTH EVERY DAY 90 tablet 3    carvediloL (COREG) 12.5 MG tablet TAKE 1 TABLET BY MOUTH TWICE A DAY WITH MEALS 180 tablet 3    fluticasone propionate (FLONASE) 50 mcg/actuation nasal spray SPRAY ONCE INTO EACH NOSTRIL ONCE DAILY 16 mL 3    hydroCHLOROthiazide (HYDRODIURIL) 25 MG tablet TAKE 1 TABLET BY MOUTH EVERY DAY 90 tablet 3    hydrocortisone 2.5 % ointment as needed.      ketoconazole (NIZORAL) 2 % cream APPLY BETWEEN TOES TWICE DAILY X 2 WKS      montelukast (SINGULAIR) 10 mg tablet TAKE 1 TABLET BY MOUTH EVERY DAY IN THE EVENING 90 tablet 0    pantoprazole (PROTONIX) 40 MG tablet Take 1 tablet (40 mg total) by mouth once daily. 90 tablet 3    aspirin (ECOTRIN) 81 MG EC tablet Take 1 tablet (81 mg total) by mouth once daily.  0     No current facility-administered medications on file prior to visit.           ALLG:  Review of patient's allergies indicates:  No Known  "Allergies          ROS:  General ROS: negative for chills, fatigue or fever  Cardiovascular ROS: no chest pain or dyspnea on exertion  Musculoskeletal ROS: positivefor - joint pain or joint stiffness.  Negative for loss of strength  Neuro ROS: Negative for syncope, numbness, or muscle weakness  Skin ROS: Negative for rash, itching or hair changes.          EXAM:  Vitals:    06/01/22 1142   BP: (!) 155/88   Pulse: 101   Weight: 65.3 kg (144 lb)   Height: 5' 4" (1.626 m)          General:  Patient is well-developed, well-nourished.  Alert and oriented x 3 and in no apparent distress.       Lower extremity physical exam:  Vascular:   Dorsalis Pedis:  present  Posterior Tibial:  present  Capillary refill time:   normal bilaterally  toes are warm to touch.  There is  presence of digital hair.  There is 1+ edema.      Neurological:    Light touch, proprioception, and sharp/dull sensation are all intact.   Mulders:  Absent  Tinels:  Absent  Reflexes:  Normal, 2+        Dermatological:    Location:  right first metatarsophalangeal joint     no tophi or subcutaneous nodules noted        Musculoskeletal:   There is pain at the 1st metatarso-phalangeal joint  with range of motion.     Muscle strength is 5/5 in all groups.    Subtalar, and ankle range of motion are within normal limits without crepitus.            6/1/2022:  RIGHT FOOT XRAY  FINDINGS:  No acute fracture or dislocation seen.  Hallux valgus with overlying soft tissue thickening.  Remote fracture distal 5th metatarsal shaft.  Distal Achilles enthesopathy.  No soft tissue edema or radiopaque retained foreign body.                    Assessment / Plan:    Problem List Items Addressed This Visit    None     Visit Diagnoses     Foot pain, right    -  Primary    Relevant Medications    methylPREDNISolone (MEDROL DOSEPACK) 4 mg tablet    Other Relevant Orders    X-Ray Foot Complete Right (Completed)    Uric acid    Acute gout of right foot, unspecified cause        " Relevant Medications    methylPREDNISolone (MEDROL DOSEPACK) 4 mg tablet    Other Relevant Orders    X-Ray Foot Complete Right (Completed)    Uric acid          · I counseled the patient on the patient's conditions, their implications and medical management.   · Serum uric acid.  · X-ray of right foot.  · Prednisone per medication orders.   Shoe and activity modification as needed for relief.     Follow up two weeks.  (She will be in [place])

## 2022-06-15 ENCOUNTER — HOSPITAL ENCOUNTER (OUTPATIENT)
Dept: RADIOLOGY | Facility: OTHER | Age: 72
Discharge: HOME | End: 2022-06-15
Attending: FAMILY MEDICINE
Payer: MEDICARE

## 2022-06-15 ENCOUNTER — OFFICE VISIT (OUTPATIENT)
Dept: PODIATRY | Facility: CLINIC | Age: 72
End: 2022-06-15
Payer: MEDICARE

## 2022-06-15 VITALS
HEIGHT: 64 IN | BODY MASS INDEX: 24.59 KG/M2 | WEIGHT: 144 LBS | SYSTOLIC BLOOD PRESSURE: 157 MMHG | DIASTOLIC BLOOD PRESSURE: 84 MMHG | HEART RATE: 86 BPM

## 2022-06-15 DIAGNOSIS — M24.50 JOINT CONTRACTURE: ICD-10-CM

## 2022-06-15 DIAGNOSIS — M20.41 HAMMER TOE OF RIGHT FOOT: ICD-10-CM

## 2022-06-15 DIAGNOSIS — M10.00 ACUTE IDIOPATHIC GOUT, UNSPECIFIED SITE: ICD-10-CM

## 2022-06-15 DIAGNOSIS — Z12.31 ENCOUNTER FOR SCREENING MAMMOGRAM FOR MALIGNANT NEOPLASM OF BREAST: ICD-10-CM

## 2022-06-15 DIAGNOSIS — Z78.0 POST-MENOPAUSAL: ICD-10-CM

## 2022-06-15 DIAGNOSIS — M79.671 FOOT PAIN, RIGHT: Primary | ICD-10-CM

## 2022-06-15 PROCEDURE — 3044F HG A1C LEVEL LT 7.0%: CPT | Mod: CPTII,S$GLB,, | Performed by: PODIATRIST

## 2022-06-15 PROCEDURE — 77063 BREAST TOMOSYNTHESIS BI: CPT | Mod: 26,,, | Performed by: RADIOLOGY

## 2022-06-15 PROCEDURE — 1160F RVW MEDS BY RX/DR IN RCRD: CPT | Mod: CPTII,S$GLB,, | Performed by: PODIATRIST

## 2022-06-15 PROCEDURE — 3288F PR FALLS RISK ASSESSMENT DOCUMENTED: ICD-10-PCS | Mod: CPTII,S$GLB,, | Performed by: PODIATRIST

## 2022-06-15 PROCEDURE — 3008F BODY MASS INDEX DOCD: CPT | Mod: CPTII,S$GLB,, | Performed by: PODIATRIST

## 2022-06-15 PROCEDURE — 99214 OFFICE O/P EST MOD 30 MIN: CPT | Mod: S$GLB,,, | Performed by: PODIATRIST

## 2022-06-15 PROCEDURE — 99214 PR OFFICE/OUTPT VISIT, EST, LEVL IV, 30-39 MIN: ICD-10-PCS | Mod: S$GLB,,, | Performed by: PODIATRIST

## 2022-06-15 PROCEDURE — 3079F DIAST BP 80-89 MM HG: CPT | Mod: CPTII,S$GLB,, | Performed by: PODIATRIST

## 2022-06-15 PROCEDURE — 1101F PR PT FALLS ASSESS DOC 0-1 FALLS W/OUT INJ PAST YR: ICD-10-PCS | Mod: CPTII,S$GLB,, | Performed by: PODIATRIST

## 2022-06-15 PROCEDURE — 77067 SCR MAMMO BI INCL CAD: CPT | Mod: 26,,, | Performed by: RADIOLOGY

## 2022-06-15 PROCEDURE — 77067 SCR MAMMO BI INCL CAD: CPT | Mod: TC

## 2022-06-15 PROCEDURE — 77080 DXA BONE DENSITY AXIAL: CPT | Mod: 26,,, | Performed by: RADIOLOGY

## 2022-06-15 PROCEDURE — 3077F SYST BP >= 140 MM HG: CPT | Mod: CPTII,S$GLB,, | Performed by: PODIATRIST

## 2022-06-15 PROCEDURE — 1160F PR REVIEW ALL MEDS BY PRESCRIBER/CLIN PHARMACIST DOCUMENTED: ICD-10-PCS | Mod: CPTII,S$GLB,, | Performed by: PODIATRIST

## 2022-06-15 PROCEDURE — 1159F PR MEDICATION LIST DOCUMENTED IN MEDICAL RECORD: ICD-10-PCS | Mod: CPTII,S$GLB,, | Performed by: PODIATRIST

## 2022-06-15 PROCEDURE — 3079F PR MOST RECENT DIASTOLIC BLOOD PRESSURE 80-89 MM HG: ICD-10-PCS | Mod: CPTII,S$GLB,, | Performed by: PODIATRIST

## 2022-06-15 PROCEDURE — 3077F PR MOST RECENT SYSTOLIC BLOOD PRESSURE >= 140 MM HG: ICD-10-PCS | Mod: CPTII,S$GLB,, | Performed by: PODIATRIST

## 2022-06-15 PROCEDURE — 3044F PR MOST RECENT HEMOGLOBIN A1C LEVEL <7.0%: ICD-10-PCS | Mod: CPTII,S$GLB,, | Performed by: PODIATRIST

## 2022-06-15 PROCEDURE — 3008F PR BODY MASS INDEX (BMI) DOCUMENTED: ICD-10-PCS | Mod: CPTII,S$GLB,, | Performed by: PODIATRIST

## 2022-06-15 PROCEDURE — 99999 PR PBB SHADOW E&M-EST. PATIENT-LVL IV: CPT | Mod: PBBFAC,,, | Performed by: PODIATRIST

## 2022-06-15 PROCEDURE — 77080 DXA BONE DENSITY AXIAL: CPT | Mod: TC

## 2022-06-15 PROCEDURE — 99999 PR PBB SHADOW E&M-EST. PATIENT-LVL IV: ICD-10-PCS | Mod: PBBFAC,,, | Performed by: PODIATRIST

## 2022-06-15 PROCEDURE — 1125F PR PAIN SEVERITY QUANTIFIED, PAIN PRESENT: ICD-10-PCS | Mod: CPTII,S$GLB,, | Performed by: PODIATRIST

## 2022-06-15 PROCEDURE — 1159F MED LIST DOCD IN RCRD: CPT | Mod: CPTII,S$GLB,, | Performed by: PODIATRIST

## 2022-06-15 PROCEDURE — 1125F AMNT PAIN NOTED PAIN PRSNT: CPT | Mod: CPTII,S$GLB,, | Performed by: PODIATRIST

## 2022-06-15 PROCEDURE — 77063 BREAST TOMOSYNTHESIS BI: CPT | Mod: TC

## 2022-06-15 PROCEDURE — 77080 DEXA BONE DENSITY SPINE HIP: ICD-10-PCS | Mod: 26,,, | Performed by: RADIOLOGY

## 2022-06-15 PROCEDURE — 3288F FALL RISK ASSESSMENT DOCD: CPT | Mod: CPTII,S$GLB,, | Performed by: PODIATRIST

## 2022-06-15 PROCEDURE — 77063 MAMMO DIGITAL SCREENING BILAT WITH TOMO: ICD-10-PCS | Mod: 26,,, | Performed by: RADIOLOGY

## 2022-06-15 PROCEDURE — 1101F PT FALLS ASSESS-DOCD LE1/YR: CPT | Mod: CPTII,S$GLB,, | Performed by: PODIATRIST

## 2022-06-15 PROCEDURE — 77067 MAMMO DIGITAL SCREENING BILAT WITH TOMO: ICD-10-PCS | Mod: 26,,, | Performed by: RADIOLOGY

## 2022-06-15 RX ORDER — DIAZEPAM 10 MG/1
TABLET ORAL
COMMUNITY
Start: 2022-04-11 | End: 2023-01-25

## 2022-06-15 RX ORDER — HYDROCODONE BITARTRATE AND ACETAMINOPHEN 5; 325 MG/1; MG/1
1 TABLET ORAL
COMMUNITY
Start: 2022-04-13 | End: 2022-12-21 | Stop reason: ALTCHOICE

## 2022-06-15 RX ORDER — AMOXICILLIN 500 MG/1
CAPSULE ORAL
COMMUNITY
Start: 2022-04-13 | End: 2023-03-14

## 2022-06-15 RX ORDER — PROMETHAZINE HYDROCHLORIDE 25 MG/1
TABLET ORAL
COMMUNITY
Start: 2022-04-13 | End: 2023-01-25

## 2022-06-15 RX ORDER — COLCHICINE 0.6 MG/1
0.6 TABLET ORAL DAILY
Qty: 15 TABLET | Refills: 0 | Status: SHIPPED | OUTPATIENT
Start: 2022-06-15 | End: 2022-08-17

## 2022-06-15 NOTE — PROGRESS NOTES
Subjective:      Patient ID: Jessica Floyd is a 71 y.o. female.    Chief Complaint: Foot Pain (R foot)    Intense pain, redness, he dorsum right foot area the big toe joint laterally.  Gradual onset not improved over the last 2 weeks.  Modest improvement with Medrol Dosepak with Dr. Trevino last week.  X-rays negative acute injury 1st MTP area they do show hammertoes and bunions.  No acute injury apparent    Review of Systems   Constitutional: Negative for chills, diaphoresis, fever, malaise/fatigue and night sweats.   Cardiovascular: Negative for claudication, cyanosis, leg swelling and syncope.   Skin: Negative for color change, dry skin, nail changes, rash, suspicious lesions and unusual hair distribution.   Musculoskeletal: Positive for joint pain and joint swelling. Negative for falls, muscle cramps, muscle weakness and stiffness.   Gastrointestinal: Negative for constipation, diarrhea, nausea and vomiting.   Neurological: Negative for brief paralysis, disturbances in coordination, focal weakness, numbness, paresthesias, sensory change and tremors.           Objective:      Physical Exam  Constitutional:       General: She is not in acute distress.     Appearance: She is well-developed. She is not diaphoretic.   Cardiovascular:      Pulses:           Popliteal pulses are 2+ on the right side and 2+ on the left side.        Dorsalis pedis pulses are 2+ on the right side and 2+ on the left side.        Posterior tibial pulses are 2+ on the right side and 2+ on the left side.      Comments: Capillary refill 3 seconds all toes/distal feet, all toes/both feet warm to touch.      Negative lymphadenopathy bilateral popliteal fossa and tarsal tunnel.      Negavie lower extremity edema bilateral.    Musculoskeletal:      Right ankle: No swelling, deformity, ecchymosis or lacerations. Normal range of motion. Normal pulse.      Right Achilles Tendon: Normal. No defects. Lopez's test negative.      Comments: Pain to  palpation of the dorsal lateral aspect of the 1st MTPJ right foot which is rather intense.    No pain to range of motion MTPJ is 1 through 5 right.    Partially reducible hammertoes and bunion right foot without symptom.   Lymphadenopathy:      Lower Body: No right inguinal adenopathy. No left inguinal adenopathy.      Comments: Negative lymphadenopathy bilateral popliteal fossa and tarsal tunnel.    Negative lymphangitic streaking bilateral feet/ankles/legs.   Skin:     General: Skin is warm and dry.      Capillary Refill: Capillary refill takes 2 to 3 seconds.      Coloration: Skin is not pale.      Findings: No abrasion, bruising, burn, ecchymosis, erythema, laceration, lesion or rash.      Nails: There is no clubbing.      Comments:   Dull erythema dorsal lateral right 1st MTPJ  without ulceration, drainage, pus, tracking, fluctuance, malodor, or cardinal signs infection.    Neurological:      Mental Status: She is alert and oriented to person, place, and time.      Sensory: No sensory deficit.      Motor: No tremor, atrophy or abnormal muscle tone.      Gait: Gait normal.      Deep Tendon Reflexes:      Reflex Scores:       Patellar reflexes are 2+ on the right side and 2+ on the left side.       Achilles reflexes are 2+ on the right side and 2+ on the left side.     Comments: Negative allodynia both feet   Psychiatric:         Behavior: Behavior is cooperative.               Assessment:       Encounter Diagnoses   Name Primary?    Foot pain, right Yes    Joint contracture     Hammer toe of right foot     Acute idiopathic gout, unspecified site          Plan:       Jessica was seen today for foot pain.    Diagnoses and all orders for this visit:    Foot pain, right  -     Basic Metabolic Panel; Future  -     CBC Auto Differential; Future  -     C-Reactive Protein; Future  -     Sedimentation rate; Future  -     Uric Acid; Future    Joint contracture  -     Basic Metabolic Panel; Future  -     CBC Auto  Differential; Future  -     C-Reactive Protein; Future  -     Sedimentation rate; Future  -     Uric Acid; Future    Hammer toe of right foot    Acute idiopathic gout, unspecified site    Other orders  -     colchicine (COLCRYS) 0.6 mg tablet; Take 1 tablet (0.6 mg total) by mouth once daily.      I counseled the patient on her conditions, their implications and medical management.        Work, colchicine.    Ambulate to tolerance stiff-soled shoe.  Hydrate well 3 L water daily.  Two weeks          Follow up in about 2 weeks (around 6/29/2022) for gout.

## 2022-06-21 ENCOUNTER — CLINICAL SUPPORT (OUTPATIENT)
Dept: REHABILITATION | Facility: OTHER | Age: 72
End: 2022-06-21
Payer: MEDICARE

## 2022-06-21 DIAGNOSIS — R26.2 DIFFICULTY WALKING: Primary | ICD-10-CM

## 2022-06-21 PROCEDURE — 97112 NEUROMUSCULAR REEDUCATION: CPT | Mod: PN

## 2022-06-21 PROCEDURE — 97110 THERAPEUTIC EXERCISES: CPT | Mod: PN

## 2022-06-21 NOTE — PROGRESS NOTES
[facility]   Physical Therapy Treatment Note     Name: Jessica Floyd  Clinic Number: [id number]    Therapy Diagnosis:   Encounter Diagnosis   Name Primary?    Difficulty walking Yes     Physician: Juan Dunaway DPM    Visit Date: 6/21/2022    Physician Orders: PT Eval and Treat   Medical Diagnosis from Referral: M77.9 (ICD-10-CM) - Capsulitis M24.50 (ICD-10-CM) - Joint contracture M24.573 (ICD-10-CM) - Equinus contracture of ankle   Evaluation Date: 4/27/2022  Authorization Period Expiration: 4/19/2023  Plan of Care Expiration: 6/27/2022  Visit # / Visits authorized: 5/20  FOTO: #2/3 on 5/18/2022     Precautions: Fall       Time In: 7:50am  Time Out: 8:55  Total Billable Time: 65 minutes    SUBJECTIVE     She was compliant with home exercise program.  Response to previous treatment: Pt reports she is still dealing with R foot issues with a bunion on her R foot and onset of gout in the area. Would like to take it easy today with exercise.   Functional change: Improved balance, is able to play with 3 y/o grand daughter.     Prior Level of Function: walking dog 2 miles/day twice a day, lifting 3 gallon watering can and walking for gardening   Current Level of Function: walking 6 blocks, negotiating uneven terrain, squatting to lift objects from the ground      Pain:  Current 4/10, worst 10/10, best 1/10   Location: L knee, R ankle/foot   Description: feels more unsteady when rushing and trying to get somewhere quickly   Aggravating Factors: worse with prolonged walking   Easing Factors: use of cane for confidence      Patients goals: build confidence in balance and improve mobility       OBJECTIVE     Objective Measures updated at progress report unless specified.     Treatment     Jessica received the treatments listed below:      therapeutic exercises for 30 minutes:    Bridging 30x  Clams 30x R/L  LTR on red ball x3'  SLR R/L 3x10   LAQ 2# 3x10 R/L  Sit to stand with piliates ring  "squeeze 3x10     neuromuscular re-education for 30 minutes:     NBOS R/L in // bars 3x30 sec   Single limb stand 20" x3 R/L, B HRA  Tandem walk, x 5 laps in // , unilateral HRA   Static stand, narrow ASHLEY, EC, 20"x5, B HRA   Standing marching in // 2#, 3x10, R/L      therapeutic activities for 5 minutes:   Shuttle single limb leg press, R/L LE, 1 black cords, 2x10 each - held today 2/2 foot pain  [place] carries 5# BD x 2 laps around gym   Step ups 6" step, single HRA 2x10 R/L        gait training for minutes:         Patient Education and Home Exercises     Home Exercises Provided and Patient Education Provided     Education provided:   -Exercise form and rationale     Written Home Exercises Provided: Patient instructed to cont prior HEP. Exercises were reviewed and Jessica was able to demonstrate them prior to the end of the session.  Jessica demonstrated good  understanding of the education provided. See EMR under Patient Instructions for exercises provided during therapy sessions    ASSESSMENT   Pt was able to tolerate treatment well today. Pt was able to weight bear on R LE with mild antalgic gait. Endurance continues be low with exercises with moderate effort exerted, Pt requires prolonged rest breaks between exercises due to fatigue. Pt demonstrated poor eccentric control with return during step ups and sit<>stands, provided moderate verbal cues to correct. Pt exhibited L postural sway when advancing L LE during tandem walking due to L LE weakness. Continue to progress balance/functional mobility training as tolerated.       Jessica Is progressing well towards her goals.     Patient prognosis is Good.         Pt will continue to benefit from skilled outpatient physical therapy to address the deficits listed in the problem list box on initial evaluation, provide pt/family education and to maximize pt's level of independence in the home and community environment.     Pt's spiritual, cultural and educational needs " "considered and pt agreeable to plan of care and goals.       Anticipated Barriers for therapy: fear avoidance, chronicity of current condition, multiple tx areas       Goals:  Short Term Goals: 3 weeks   (1) patient will be I with HOME EXERCISE PROGRAM (initial, not met)   (2) patient will obtain 4+/5 R hip flexion MMT to facilitate improved ability to step over obstacles on ground with improved safety and efficiency (initial, not met)   (3) patient will walk X 50 ft while carrying #15 unilaterally to facilitate return to previous level of function for gardening (initial, not met)      Long Term Goals: 6 weeks   (1) patient will complete TUG, no AD, < 10" average to facilitate indicate decreased risk of fall during community mobility tasks (initial, not met)   (2) patient will negotiate uneven terrain while gardening X  100 ft, no loss of balance, to indicate decreased risk of fall (initial, not met)  (3) patient will tolerate walking dog 2 miles/day to facilitate return to previous level of function (initial, not met)       PLAN     Plan of care Certification: 4/27/2022 to 6/27/2022    Focus on balance/proprioception, LE strengthening with emphasis on ADL's/carrying objects.      Outpatient Physical Therapy 2 times weekly for 6 weeks to include the following interventions: Cervical/Lumbar Traction, Electrical Stimulation , re-eval, dry needling , Gait Training, Manual Therapy, Moist Heat/ Ice, Neuromuscular Re-ed, Patient Education, Self Care, Therapeutic Activities and Therapeutic Exercise.      Physical therapist and physical therapy assistant(s) met face to face to discuss patient's treatment plan and progress towards established goals. Pt will be seen by a physical therapist minimally every 6th visit or every 30 days.    Pt was co-treated by JOSE Grubbs Mai under the direct supervision of a Licensed Physical Therapist    Shazia Elkins, PT        "

## 2022-06-22 ENCOUNTER — PATIENT MESSAGE (OUTPATIENT)
Dept: PRIMARY CARE CLINIC | Facility: CLINIC | Age: 72
End: 2022-06-22
Payer: MEDICARE

## 2022-06-22 DIAGNOSIS — J30.1 ALLERGIC RHINITIS DUE TO POLLEN, UNSPECIFIED SEASONALITY: Primary | ICD-10-CM

## 2022-06-22 RX ORDER — MONTELUKAST SODIUM 10 MG/1
10 TABLET ORAL NIGHTLY
Qty: 90 TABLET | Refills: 1 | Status: SHIPPED | OUTPATIENT
Start: 2022-06-22 | End: 2022-12-21 | Stop reason: ALTCHOICE

## 2022-06-22 NOTE — TELEPHONE ENCOUNTER
No new care gaps identified.  [facility] Embedded Care Gaps. Reference number: [id number]. 6/22/2022   11:57:36 AM SAMARAT

## 2022-06-23 ENCOUNTER — CLINICAL SUPPORT (OUTPATIENT)
Dept: REHABILITATION | Facility: OTHER | Age: 72
End: 2022-06-23
Payer: MEDICARE

## 2022-06-23 DIAGNOSIS — R26.2 DIFFICULTY WALKING: Primary | ICD-10-CM

## 2022-06-23 PROCEDURE — 97110 THERAPEUTIC EXERCISES: CPT | Mod: PN

## 2022-06-23 PROCEDURE — 97112 NEUROMUSCULAR REEDUCATION: CPT | Mod: PN

## 2022-06-23 PROCEDURE — 97530 THERAPEUTIC ACTIVITIES: CPT | Mod: PN

## 2022-06-23 NOTE — PROGRESS NOTES
"[facility]   Physical Therapy Treatment Note     Name: Jessica Floyd  Clinic Number: [id number]    Therapy Diagnosis:   Encounter Diagnosis   Name Primary?    Difficulty walking Yes     Physician: Juan Dunaway DPM    Visit Date: 6/23/2022    Physician Orders: PT Eval and Treat   Medical Diagnosis from Referral: M77.9 (ICD-10-CM) - Capsulitis M24.50 (ICD-10-CM) - Joint contracture M24.573 (ICD-10-CM) - Equinus contracture of ankle   Evaluation Date: 4/27/2022  Authorization Period Expiration: 4/19/2023  Plan of Care Expiration: 6/27/2022  Visit # / Visits authorized: 5/20  FOTO: #2/3 on 5/18/2022     Precautions: Fall       Time In: 7:50 am  Time Out: 9:55am  Total Billable Time: 65 minutes    SUBJECTIVE     She was compliant with home exercise program.  Response to previous treatment: Pt reports mild soreness in her quads today.   Functional change: Pt was able to maintain squatting position in order to perform gardening this week.     Pain:  Current 4/10, worst 10/10, best 1/10   Location: L knee, R ankle/foot   Description: feels more unsteady when rushing and trying to get somewhere quickly   Aggravating Factors: worse with prolonged walking   Easing Factors: use of cane for confidence      Patients goals: build confidence in balance and improve mobility       OBJECTIVE     Objective Measures updated at progress report unless specified.     Treatment     Jessica received the treatments listed below:      therapeutic exercises for 25 minutes:    Bridging 30x  Clams 30x R/L OTB   LTR on red ball x3'    SLR R/L 3x10   LAQ 2# 3x10 R/L   Sit to stand with piliates ring squeeze 3x10     neuromuscular re-education for 30 minutes:     NBOS R/L in // bars 3x30 sec   Single limb stand 20" x3 R/L, B HRA   Tandem walk, x 5 laps in // , unilateral HRA   Static stand, narrow ASHLEY, EC, 20"x5, B HRA   Standing marching in // 2#, 3x10, R/L      therapeutic activities for 10 minutes:   Shuttle single " "limb leg press, R/L LE, 1 black cords, 2x10 each - held today 2/2 foot pain  [place] carries 5# BD x 2 laps around gym   Step ups 6" step, single HRA 2x10 R/L  +Hesitation marches in // bars unilateral HRA 4 laps         gait training for minutes:         Patient Education and Home Exercises     Home Exercises Provided and Patient Education Provided     Education provided:   -Exercise form and rationale     Written Home Exercises Provided: Patient instructed to cont prior HEP. Exercises were reviewed and Jessica was able to demonstrate them prior to the end of the session.  Jessica demonstrated good  understanding of the education provided. See EMR under Patient Instructions for exercises provided during therapy sessions    ASSESSMENT   Pt was able to tolerate treatment well today. Pt's endurance continues to be low, requiring prolonged rest breaks between exercises due to fatigue. Provided verbal cues for eccentric control with sit<>stands, step ups, and marching exercises as her control was poor. Pt was able to ween off 1 hand during balance activities today, utilizing unilateral HRA to maintain balance. Will continue to progress balance/functional mobility training as tolerated.     Jessica Is progressing well towards her goals.     Patient prognosis is Good.         Pt will continue to benefit from skilled outpatient physical therapy to address the deficits listed in the problem list box on initial evaluation, provide pt/family education and to maximize pt's level of independence in the home and community environment.     Pt's spiritual, cultural and educational needs considered and pt agreeable to plan of care and goals.       Anticipated Barriers for therapy: fear avoidance, chronicity of current condition, multiple tx areas       Goals:  Short Term Goals: 3 weeks   (1) patient will be I with HOME EXERCISE PROGRAM (initial, not met)   (2) patient will obtain 4+/5 R hip flexion MMT to facilitate improved ability " "to step over obstacles on ground with improved safety and efficiency (initial, not met)   (3) patient will walk X 50 ft while carrying #15 unilaterally to facilitate return to previous level of function for gardening (initial, not met)      Long Term Goals: 6 weeks   (1) patient will complete TUG, no AD, < 10" average to facilitate indicate decreased risk of fall during community mobility tasks (initial, not met)   (2) patient will negotiate uneven terrain while gardening X  100 ft, no loss of balance, to indicate decreased risk of fall (initial, not met)  (3) patient will tolerate walking dog 2 miles/day to facilitate return to previous level of function (initial, not met)       PLAN     Plan of care Certification: 4/27/2022 to 6/27/2022    Focus on balance/proprioception, LE strengthening with emphasis on ADL's/carrying objects.      Outpatient Physical Therapy 2 times weekly for 6 weeks to include the following interventions: Cervical/Lumbar Traction, Electrical Stimulation , re-eval, dry needling , Gait Training, Manual Therapy, Moist Heat/ Ice, Neuromuscular Re-ed, Patient Education, Self Care, Therapeutic Activities and Therapeutic Exercise.      Physical therapist and physical therapy assistant(s) met face to face to discuss patient's treatment plan and progress towards established goals. Pt will be seen by a physical therapist minimally every 6th visit or every 30 days.    Pt was co-treated by JOSE Grubbs Mai under the direct supervision of a Licensed Physical Therapist    Shazia Elkins PT        "

## 2022-06-24 ENCOUNTER — TELEPHONE (OUTPATIENT)
Dept: DERMATOLOGY | Facility: CLINIC | Age: 72
End: 2022-06-24
Payer: MEDICARE

## 2022-06-24 NOTE — TELEPHONE ENCOUNTER
----- Message from January Weldon sent at 6/24/2022 10:53 AM CDT -----  Regarding: Appt Access  Pt called and advised she need a same day appt for a rash on her R hand that has blisters. Requesting a call back.      [phone] (home)

## 2022-06-24 NOTE — TELEPHONE ENCOUNTER
Spoke with patient, she reports that her normal Dermatologist tested positive for covid today and she wanted to get in today with one of our Derms.  Explained that we did not have any appointments today.  She is going to wait and see her Dermatologist.  Will let us know if she needs us in the future.

## 2022-06-27 NOTE — PROGRESS NOTES
"[facility]   Physical Therapy Updated Plan of Care     Name: Jessiac Floyd  Clinic Number: [id number]    Therapy Diagnosis:   Encounter Diagnosis   Name Primary?    Difficulty walking Yes     Physician: Juan Dunaway DPM    Visit Date: 6/28/2022    Physician Orders: PT Eval and Treat   Medical Diagnosis from Referral: M77.9 (ICD-10-CM) - Capsulitis M24.50 (ICD-10-CM) - Joint contracture M24.573 (ICD-10-CM) - Equinus contracture of ankle   Evaluation Date: 4/27/2022  Authorization Period Expiration: 4/19/2023  Plan of Care Expiration: 6/28/22-8/28/22  Visit # / Visits authorized: 7/20  FOTO: #3/3 on 6/28/22     Precautions: Fall       Time In: 7:50 am  Time Out: 9:55am  Total Billable Time: 65 minutes    SUBJECTIVE     She was compliant with home exercise program.  Response to previous treatment: "I felt terrific after last session. My wrist is the only thing bothering me."  Functional change: "Gardening is getting much easier."    Pain:  Current 1/10, worst 10/10, best 1/10   Location: L knee, R ankle/foot   Description: feels more unsteady when rushing and trying to get somewhere quickly   Aggravating Factors: worse with prolonged walking   Easing Factors: use of cane for confidence      Patients goals: build confidence in balance and improve mobility       OBJECTIVE   6/28/22:     Posture:  Forward flexed at hips, forward head, rounded shoulders      Lower Extremity Strength  Right LE Left LE   Knee extension: 4- Knee extension: 4-   Knee flexion: 4 Knee flexion: 4-   Hip flexion: 4 Hip flexion: 4-   Ankle dorsiflexion: 4 Ankle dorsiflexion: 4   Ankle plantarflexion: B 25 heel raises with B HRA Ankle plantarflexion:B 25 heel raises with B HRA        Neuro Dynamic Testing:   Slump:  R: negative   L; negative      Function/balance:   Sit - stand: 10 X in 30" - Pt utilizes B hands to push up from chair     TUG: (no AD)   Trial 1:12"  Trial 2: 13"  Trial 3: 13"  Average: " "12.5"     Modified CTSIB:  Narrow ASHLEY, firm surface EO: CGA, steady, 30"  Narrow ASHLEY, firm surface EC: CGA, unsteady and fearful of letting go of // bars- fingertip assist  Narrow ASHLEY, unsteady surface EO: patient fearful and refuses to let go of // bars- 30" with fingertip assist    Tandem stand with R LE in front: 0" patient fearful and refuses to let go of // bars- 30" with fingertip assist  Tandem stand with L LE in front: 0"patient fearful and refuses to let go of // bars- 30" with fingertip assist     Limitation/Restriction for FOTO Ankle Survey     Therapist reviewed FOTO scores for Jessica Floyd on 6/28/22  FOTO documents entered into [employer] - see Media section.     Limitation Score: 31%  Predicted Score: 25%           Objective Measures updated at progress report unless specified.     Treatment     Jessica received the treatments listed below:      therapeutic exercises for 25 minutes:    Bridging 30x  Clams 30x R/L OTB   LTR on red ball x3'    SLR R/L 3x10   LAQ 2# 3x10 R/L   Sit to stand with piliates ring squeeze 3x10  +Updated POC     neuromuscular re-education for 30 minutes:     NBOS R/L in // bars 3x30 sec   Single limb stand 20" x3 R/L, B HRA   Tandem walk, x 5 laps in // , unilateral HRA   Static stand, narrow ASHLEY, EC, 20"x5, B HRA   Standing marching in // 2#, 3x10, R/L      therapeutic activities for 10 minutes:   Shuttle single limb leg press, R/L LE, 1 black cords, 2x10 each - held today 2/2 foot pain  [place] carries 5# BD x 2 laps around gym   Step ups 6" step, single HRA 2x10 R/L  Hesitation marches in // bars unilateral HRA 4 laps           Patient Education and Home Exercises     Home Exercises Provided and Patient Education Provided     Education provided:   -Exercise form and rationale     Written Home Exercises Provided: Patient instructed to cont prior HEP. Exercises were reviewed and Jessica was able to demonstrate them prior to the end of the session.  Jessica demonstrated good  " "understanding of the education provided. See EMR under Patient Instructions for exercises provided during therapy sessions    ASSESSMENT   Pt presents to physical therapy treatment with decreased active range of motion, decreased strength, poor posture, and increased pain due to balance impairments. These factors are negatively impacting the patient's ability to complete their activities of daily living and work duties. Pt is progressing towards their short and long term goals as evidenced by decreased pain, improved strength and range of motion, and improved FOTO score. These goals remain appropriate for the plan of care. Pt would benefit from continued skilled physical therapy treatment to address these deficits so that they may return to their prior level of function with improved quality of life.      Jessica Is progressing well towards her goals.     Patient prognosis is Good.         Pt will continue to benefit from skilled outpatient physical therapy to address the deficits listed in the problem list box on initial evaluation, provide pt/family education and to maximize pt's level of independence in the home and community environment.     Pt's spiritual, cultural and educational needs considered and pt agreeable to plan of care and goals.       Anticipated Barriers for therapy: fear avoidance, chronicity of current condition, multiple tx areas       Goals:  Short Term Goals: 3 weeks   (1) patient will be I with HOME EXERCISE PROGRAM (MET, 6/28/22)  (2) patient will obtain 4+/5 R hip flexion MMT to facilitate improved ability to step over obstacles on ground with improved safety and efficiency (Progressing, not met 6/28/22)  (3) patient will walk X 50 ft while carrying #15 unilaterally to facilitate return to previous level of function for gardening (Progressing, not met 6/28/22)     Long Term Goals: 6 weeks   (1) patient will complete TUG, no AD, < 10" average to facilitate indicate decreased risk of fall " during community mobility tasks(Progressing, not met 6/28/22)  (2) patient will negotiate uneven terrain while gardening X  100 ft, no loss of balance, to indicate decreased risk of fall (Progressing, not met 6/28/22)  (3) patient will tolerate walking dog 2 miles/day to facilitate return to previous level of function(Progressing, not met 6/28/22)      PLAN     Plan of care Certification: 6/28/22- 8/28/22    Focus on balance/proprioception, LE strengthening with emphasis on ADL's/carrying objects.      Outpatient Physical Therapy 2 times weekly for 4 weeks to include the following interventions: Cervical/Lumbar Traction, Electrical Stimulation , re-eval, dry needling , Gait Training, Manual Therapy, Moist Heat/ Ice, Neuromuscular Re-ed, Patient Education, Self Care, Therapeutic Activities and Therapeutic Exercise.      Physical therapist and physical therapy assistant(s) met face to face to discuss patient's treatment plan and progress towards established goals. Pt will be seen by a physical therapist minimally every 6th visit or every 30 days.      Shazia Elkins, PT

## 2022-06-28 ENCOUNTER — CLINICAL SUPPORT (OUTPATIENT)
Dept: REHABILITATION | Facility: OTHER | Age: 72
End: 2022-06-28
Payer: MEDICARE

## 2022-06-28 DIAGNOSIS — R26.2 DIFFICULTY WALKING: Primary | ICD-10-CM

## 2022-06-28 PROCEDURE — 97112 NEUROMUSCULAR REEDUCATION: CPT | Mod: PN

## 2022-06-28 PROCEDURE — 97530 THERAPEUTIC ACTIVITIES: CPT | Mod: PN

## 2022-06-28 PROCEDURE — 97110 THERAPEUTIC EXERCISES: CPT | Mod: PN

## 2022-06-28 NOTE — PLAN OF CARE
"[facility]   Physical Therapy Updated Plan of Care     Name: Jessica Floyd  Clinic Number: [id number]    Therapy Diagnosis:   Encounter Diagnosis   Name Primary?    Difficulty walking Yes     Physician: Juan Dunaway DPM    Visit Date: 6/28/2022    Physician Orders: PT Eval and Treat   Medical Diagnosis from Referral: M77.9 (ICD-10-CM) - Capsulitis M24.50 (ICD-10-CM) - Joint contracture M24.573 (ICD-10-CM) - Equinus contracture of ankle   Evaluation Date: 4/27/2022  Authorization Period Expiration: 4/19/2023  Plan of Care Expiration: 6/28/22-8/28/22  Visit # / Visits authorized: 7/20  FOTO: #3/3 on 6/28/22     Precautions: Fall       Time In: 7:50 am  Time Out: 9:55am  Total Billable Time: 65 minutes    SUBJECTIVE     She was compliant with home exercise program.  Response to previous treatment: "I felt terrific after last session. My wrist is the only thing bothering me."  Functional change: "Gardening is getting much easier."    Pain:  Current 1/10, worst 10/10, best 1/10   Location: L knee, R ankle/foot   Description: feels more unsteady when rushing and trying to get somewhere quickly   Aggravating Factors: worse with prolonged walking   Easing Factors: use of cane for confidence      Patients goals: build confidence in balance and improve mobility       OBJECTIVE   6/28/22:     Posture:  Forward flexed at hips, forward head, rounded shoulders      Lower Extremity Strength  Right LE Left LE   Knee extension: 4- Knee extension: 4-   Knee flexion: 4 Knee flexion: 4-   Hip flexion: 4 Hip flexion: 4-   Ankle dorsiflexion: 4 Ankle dorsiflexion: 4   Ankle plantarflexion: B 25 heel raises with B HRA Ankle plantarflexion:B 25 heel raises with B HRA        Neuro Dynamic Testing:   Slump:  R: negative   L; negative      Function/balance:   Sit - stand: 10 X in 30" - Pt utilizes B hands to push up from chair     TUG: (no AD)   Trial 1:12"  Trial 2: 13"  Trial 3: 13"  Average: " MRI ordered by primary. Could be beneficial as existing workup has not yet elucidated a cause for his intermittent chest pain. Will follow up results. Discussed this with patient.    "12.5"     Modified CTSIB:  Narrow ASHLEY, firm surface EO: CGA, steady, 30"  Narrow ASHLEY, firm surface EC: CGA, unsteady and fearful of letting go of // bars- fingertip assist  Narrow ASHLEY, unsteady surface EO: patient fearful and refuses to let go of // bars- 30" with fingertip assist    Tandem stand with R LE in front: 0" patient fearful and refuses to let go of // bars- 30" with fingertip assist  Tandem stand with L LE in front: 0"patient fearful and refuses to let go of // bars- 30" with fingertip assist     Limitation/Restriction for FOTO Ankle Survey     Therapist reviewed FOTO scores for Jessica Floyd on 6/28/22  FOTO documents entered into [employer] - see Media section.     Limitation Score: 31%  Predicted Score: 25%           Objective Measures updated at progress report unless specified.     Treatment     Jessica received the treatments listed below:      therapeutic exercises for 25 minutes:    Bridging 30x  Clams 30x R/L OTB   LTR on red ball x3'    SLR R/L 3x10   LAQ 2# 3x10 R/L   Sit to stand with piliates ring squeeze 3x10  +Updated POC     neuromuscular re-education for 30 minutes:     NBOS R/L in // bars 3x30 sec   Single limb stand 20" x3 R/L, B HRA   Tandem walk, x 5 laps in // , unilateral HRA   Static stand, narrow ASHLEY, EC, 20"x5, B HRA   Standing marching in // 2#, 3x10, R/L      therapeutic activities for 10 minutes:   Shuttle single limb leg press, R/L LE, 1 black cords, 2x10 each - held today 2/2 foot pain  [place] carries 5# BD x 2 laps around gym   Step ups 6" step, single HRA 2x10 R/L  Hesitation marches in // bars unilateral HRA 4 laps           Patient Education and Home Exercises     Home Exercises Provided and Patient Education Provided     Education provided:   -Exercise form and rationale     Written Home Exercises Provided: Patient instructed to cont prior HEP. Exercises were reviewed and Jessica was able to demonstrate them prior to the end of the session.  Jessica demonstrated good  " "understanding of the education provided. See EMR under Patient Instructions for exercises provided during therapy sessions    ASSESSMENT   Pt presents to physical therapy treatment with decreased active range of motion, decreased strength, poor posture, and increased pain due to balance impairments. These factors are negatively impacting the patient's ability to complete their activities of daily living and work duties. Pt is progressing towards their short and long term goals as evidenced by decreased pain, improved strength and range of motion, and improved FOTO score. These goals remain appropriate for the plan of care. Pt would benefit from continued skilled physical therapy treatment to address these deficits so that they may return to their prior level of function with improved quality of life.      Jessica Is progressing well towards her goals.     Patient prognosis is Good.         Pt will continue to benefit from skilled outpatient physical therapy to address the deficits listed in the problem list box on initial evaluation, provide pt/family education and to maximize pt's level of independence in the home and community environment.     Pt's spiritual, cultural and educational needs considered and pt agreeable to plan of care and goals.       Anticipated Barriers for therapy: fear avoidance, chronicity of current condition, multiple tx areas       Goals:  Short Term Goals: 3 weeks   (1) patient will be I with HOME EXERCISE PROGRAM (MET, 6/28/22)  (2) patient will obtain 4+/5 R hip flexion MMT to facilitate improved ability to step over obstacles on ground with improved safety and efficiency (Progressing, not met 6/28/22)  (3) patient will walk X 50 ft while carrying #15 unilaterally to facilitate return to previous level of function for gardening (Progressing, not met 6/28/22)     Long Term Goals: 6 weeks   (1) patient will complete TUG, no AD, < 10" average to facilitate indicate decreased risk of fall " during community mobility tasks(Progressing, not met 6/28/22)  (2) patient will negotiate uneven terrain while gardening X  100 ft, no loss of balance, to indicate decreased risk of fall (Progressing, not met 6/28/22)  (3) patient will tolerate walking dog 2 miles/day to facilitate return to previous level of function(Progressing, not met 6/28/22)      PLAN     Plan of care Certification: 6/28/22- 8/28/22    Focus on balance/proprioception, LE strengthening with emphasis on ADL's/carrying objects.      Outpatient Physical Therapy 2 times weekly for 4 weeks to include the following interventions: Cervical/Lumbar Traction, Electrical Stimulation , re-eval, dry needling , Gait Training, Manual Therapy, Moist Heat/ Ice, Neuromuscular Re-ed, Patient Education, Self Care, Therapeutic Activities and Therapeutic Exercise.      Physical therapist and physical therapy assistant(s) met face to face to discuss patient's treatment plan and progress towards established goals. Pt will be seen by a physical therapist minimally every 6th visit or every 30 days.      Shazia Elkins, PT

## 2022-06-29 ENCOUNTER — OFFICE VISIT (OUTPATIENT)
Dept: PODIATRY | Facility: CLINIC | Age: 72
End: 2022-06-29
Payer: MEDICARE

## 2022-06-29 VITALS
HEART RATE: 88 BPM | BODY MASS INDEX: 24.59 KG/M2 | DIASTOLIC BLOOD PRESSURE: 82 MMHG | WEIGHT: 144 LBS | HEIGHT: 64 IN | SYSTOLIC BLOOD PRESSURE: 148 MMHG

## 2022-06-29 DIAGNOSIS — M10.00 ACUTE IDIOPATHIC GOUT, UNSPECIFIED SITE: ICD-10-CM

## 2022-06-29 DIAGNOSIS — M79.671 FOOT PAIN, RIGHT: Primary | ICD-10-CM

## 2022-06-29 PROCEDURE — 3288F PR FALLS RISK ASSESSMENT DOCUMENTED: ICD-10-PCS | Mod: CPTII,S$GLB,, | Performed by: PODIATRIST

## 2022-06-29 PROCEDURE — 1101F PR PT FALLS ASSESS DOC 0-1 FALLS W/OUT INJ PAST YR: ICD-10-PCS | Mod: CPTII,S$GLB,, | Performed by: PODIATRIST

## 2022-06-29 PROCEDURE — 3044F PR MOST RECENT HEMOGLOBIN A1C LEVEL <7.0%: ICD-10-PCS | Mod: CPTII,S$GLB,, | Performed by: PODIATRIST

## 2022-06-29 PROCEDURE — 99212 OFFICE O/P EST SF 10 MIN: CPT | Mod: S$GLB,,, | Performed by: PODIATRIST

## 2022-06-29 PROCEDURE — 3044F HG A1C LEVEL LT 7.0%: CPT | Mod: CPTII,S$GLB,, | Performed by: PODIATRIST

## 2022-06-29 PROCEDURE — 3077F PR MOST RECENT SYSTOLIC BLOOD PRESSURE >= 140 MM HG: ICD-10-PCS | Mod: CPTII,S$GLB,, | Performed by: PODIATRIST

## 2022-06-29 PROCEDURE — 3079F PR MOST RECENT DIASTOLIC BLOOD PRESSURE 80-89 MM HG: ICD-10-PCS | Mod: CPTII,S$GLB,, | Performed by: PODIATRIST

## 2022-06-29 PROCEDURE — 3288F FALL RISK ASSESSMENT DOCD: CPT | Mod: CPTII,S$GLB,, | Performed by: PODIATRIST

## 2022-06-29 PROCEDURE — 3008F BODY MASS INDEX DOCD: CPT | Mod: CPTII,S$GLB,, | Performed by: PODIATRIST

## 2022-06-29 PROCEDURE — 99999 PR PBB SHADOW E&M-EST. PATIENT-LVL IV: CPT | Mod: PBBFAC,,, | Performed by: PODIATRIST

## 2022-06-29 PROCEDURE — 3008F PR BODY MASS INDEX (BMI) DOCUMENTED: ICD-10-PCS | Mod: CPTII,S$GLB,, | Performed by: PODIATRIST

## 2022-06-29 PROCEDURE — 1160F PR REVIEW ALL MEDS BY PRESCRIBER/CLIN PHARMACIST DOCUMENTED: ICD-10-PCS | Mod: CPTII,S$GLB,, | Performed by: PODIATRIST

## 2022-06-29 PROCEDURE — 3077F SYST BP >= 140 MM HG: CPT | Mod: CPTII,S$GLB,, | Performed by: PODIATRIST

## 2022-06-29 PROCEDURE — 1159F MED LIST DOCD IN RCRD: CPT | Mod: CPTII,S$GLB,, | Performed by: PODIATRIST

## 2022-06-29 PROCEDURE — 3079F DIAST BP 80-89 MM HG: CPT | Mod: CPTII,S$GLB,, | Performed by: PODIATRIST

## 2022-06-29 PROCEDURE — 1125F AMNT PAIN NOTED PAIN PRSNT: CPT | Mod: CPTII,S$GLB,, | Performed by: PODIATRIST

## 2022-06-29 PROCEDURE — 1125F PR PAIN SEVERITY QUANTIFIED, PAIN PRESENT: ICD-10-PCS | Mod: CPTII,S$GLB,, | Performed by: PODIATRIST

## 2022-06-29 PROCEDURE — 99999 PR PBB SHADOW E&M-EST. PATIENT-LVL IV: ICD-10-PCS | Mod: PBBFAC,,, | Performed by: PODIATRIST

## 2022-06-29 PROCEDURE — 99212 PR OFFICE/OUTPT VISIT, EST, LEVL II, 10-19 MIN: ICD-10-PCS | Mod: S$GLB,,, | Performed by: PODIATRIST

## 2022-06-29 PROCEDURE — 1159F PR MEDICATION LIST DOCUMENTED IN MEDICAL RECORD: ICD-10-PCS | Mod: CPTII,S$GLB,, | Performed by: PODIATRIST

## 2022-06-29 PROCEDURE — 1160F RVW MEDS BY RX/DR IN RCRD: CPT | Mod: CPTII,S$GLB,, | Performed by: PODIATRIST

## 2022-06-29 PROCEDURE — 1101F PT FALLS ASSESS-DOCD LE1/YR: CPT | Mod: CPTII,S$GLB,, | Performed by: PODIATRIST

## 2022-06-29 NOTE — PROGRESS NOTES
Subjective:      Patient ID: Jessica Floyd is a 71 y.o. female.    Chief Complaint: Follow-up (Right foot pain)    Intense pain, redness, he dorsum right foot area the big toe joint laterally.  Gradual onset resolved over the last 2 weeks.   X-rays negative acute injury 1st MTP area they do show hammertoes and bunions.  No acute injury apparent    Review of Systems   Constitutional: Negative for chills, diaphoresis, fever, malaise/fatigue and night sweats.   Cardiovascular: Negative for claudication, cyanosis, leg swelling and syncope.   Skin: Negative for color change, dry skin, nail changes, rash, suspicious lesions and unusual hair distribution.   Musculoskeletal: Positive for joint pain and joint swelling. Negative for falls, muscle cramps, muscle weakness and stiffness.   Gastrointestinal: Negative for constipation, diarrhea, nausea and vomiting.   Neurological: Negative for brief paralysis, disturbances in coordination, focal weakness, numbness, paresthesias, sensory change and tremors.           Objective:      Physical Exam  Constitutional:       General: She is not in acute distress.     Appearance: She is well-developed. She is not diaphoretic.   Cardiovascular:      Pulses:           Popliteal pulses are 2+ on the right side and 2+ on the left side.        Dorsalis pedis pulses are 2+ on the right side and 2+ on the left side.        Posterior tibial pulses are 2+ on the right side and 2+ on the left side.      Comments: Capillary refill 3 seconds all toes/distal feet, all toes/both feet warm to touch.      Negative lymphadenopathy bilateral popliteal fossa and tarsal tunnel.      Negavie lower extremity edema bilateral.    Musculoskeletal:      Right ankle: No swelling, deformity, ecchymosis or lacerations. Normal range of motion. Normal pulse.      Right Achilles Tendon: Normal. No defects. Lopez's test negative.      Comments: No current pain to palpation of the dorsal lateral aspect of the 1st MTPJ  right foot which is rather intense.    No pain to range of motion MTPJ is 1 through 5 right.    Partially reducible hammertoes and bunion right foot without symptom.   Lymphadenopathy:      Lower Body: No right inguinal adenopathy. No left inguinal adenopathy.      Comments: Negative lymphadenopathy bilateral popliteal fossa and tarsal tunnel.    Negative lymphangitic streaking bilateral feet/ankles/legs.   Skin:     General: Skin is warm and dry.      Capillary Refill: Capillary refill takes 2 to 3 seconds.      Coloration: Skin is not pale.      Findings: No abrasion, bruising, burn, ecchymosis, erythema, laceration, lesion or rash.      Nails: There is no clubbing.      Comments:   Skin is normal age and health appropriate color, turgor, texture, and temperature bilateral lower extremities without ulceration, hyperpigmentation, discoloration, masses nodules or cords palpated.  No ecchymosis, erythema, edema, or cardinal signs of infection bilateral lower extremities.     Neurological:      Mental Status: She is alert and oriented to person, place, and time.      Sensory: No sensory deficit.      Motor: No tremor, atrophy or abnormal muscle tone.      Gait: Gait normal.      Deep Tendon Reflexes:      Reflex Scores:       Patellar reflexes are 2+ on the right side and 2+ on the left side.       Achilles reflexes are 2+ on the right side and 2+ on the left side.     Comments: Negative allodynia both feet   Psychiatric:         Behavior: Behavior is cooperative.               Assessment:       Encounter Diagnoses   Name Primary?    Foot pain, right Yes    Acute idiopathic gout, unspecified site          Plan:       Jessica was seen today for follow-up.    Diagnoses and all orders for this visit:    Foot pain, right    Acute idiopathic gout, unspecified site      I counseled the patient on her conditions, their implications and medical management.      Ambulate to tolerance stiff-soled shoe.  Hydrate well 3 L water  daily.            Follow up if symptoms worsen or fail to improve.

## 2022-06-30 ENCOUNTER — CLINICAL SUPPORT (OUTPATIENT)
Dept: REHABILITATION | Facility: OTHER | Age: 72
End: 2022-06-30
Payer: MEDICARE

## 2022-06-30 DIAGNOSIS — R26.2 DIFFICULTY WALKING: Primary | ICD-10-CM

## 2022-06-30 PROCEDURE — 97530 THERAPEUTIC ACTIVITIES: CPT | Mod: PN,CQ

## 2022-06-30 PROCEDURE — 97110 THERAPEUTIC EXERCISES: CPT | Mod: PN,CQ

## 2022-06-30 PROCEDURE — 97112 NEUROMUSCULAR REEDUCATION: CPT | Mod: PN,CQ

## 2022-06-30 NOTE — PROGRESS NOTES
"[facility]   Physical Therapy Daily Treatment Note    Name: Jessica Floyd  Clinic Number: [id number]    Therapy Diagnosis:   Encounter Diagnosis   Name Primary?    Difficulty walking Yes     Physician: Juan Dunaway DPM    Visit Date: 6/30/2022    Physician Orders: PT Eval and Treat   Medical Diagnosis from Referral: M77.9 (ICD-10-CM) - Capsulitis M24.50 (ICD-10-CM) - Joint contracture M24.573 (ICD-10-CM) - Equinus contracture of ankle   Evaluation Date: 4/27/2022  Authorization Period Expiration: 4/19/2023  Plan of Care Expiration: 6/28/22-8/28/22  Visit # / Visits authorized: 7/20  FOTO: #3/3 on 6/28/22     Precautions: Fall       Time In: 0805  Time Out: 0905  Total Billable Time: 60 minutes    SUBJECTIVE     She was compliant with home exercise program.  Response to previous treatment: felt great. States she was a little tired after her last PT session but is feeling like therapy is helping with her balance and strength.   Functional change: "Gardening is getting much easier."    Pain:  Current 1/10, worst 10/10, best 1/10   Location: L knee, R ankle/foot   Description: feels more unsteady when rushing and trying to get somewhere quickly   Aggravating Factors: worse with prolonged walking   Easing Factors: use of cane for confidence      Patients goals: build confidence in balance and improve mobility       OBJECTIVE   6/28/22:     Posture:  Forward flexed at hips, forward head, rounded shoulders      Lower Extremity Strength  Right LE Left LE   Knee extension: 4- Knee extension: 4-   Knee flexion: 4 Knee flexion: 4-   Hip flexion: 4 Hip flexion: 4-   Ankle dorsiflexion: 4 Ankle dorsiflexion: 4   Ankle plantarflexion: B 25 heel raises with B HRA Ankle plantarflexion:B 25 heel raises with B HRA        Neuro Dynamic Testing:   Slump:  R: negative   L; negative      Function/balance:   Sit - stand: 10 X in 30" - Pt utilizes B hands to push up from chair     TUG: (no AD)   Trial " "1:12"  Trial 2: 13"  Trial 3: 13"  Average: 12.5"     Modified CTSIB:  Narrow ASHLEY, firm surface EO: CGA, steady, 30"  Narrow ASHLEY, firm surface EC: CGA, unsteady and fearful of letting go of // bars- fingertip assist  Narrow ASHLEY, unsteady surface EO: patient fearful and refuses to let go of // bars- 30" with fingertip assist    Tandem stand with R LE in front: 0" patient fearful and refuses to let go of // bars- 30" with fingertip assist  Tandem stand with L LE in front: 0"patient fearful and refuses to let go of // bars- 30" with fingertip assist     Limitation/Restriction for FOTO Ankle Survey     Therapist reviewed FOTO scores for Jessica Floyd on 6/28/22  FOTO documents entered into [employer] - see Media section.     Limitation Score: 31%  Predicted Score: 25%           Objective Measures updated at progress report unless specified.     Treatment     Jessica received the treatments listed below:      therapeutic exercises for 20 minutes:    Bridging 30x  Clams 30x R/L OTB   LTR on red ball x3'     SLR 1#, R/L 3x10    LAQ 2# 3x10 R/L   Sit to stand with piliates ring squeeze 3x10       neuromuscular re-education for 30 minutes:     NBOS R/L in // bars 3x30 sec   Single limb stand 20" x3 R/L, B HRA   Tandem walk, x 5 laps in // , unilateral HRA   Static stand, narrow ASHLEY, EC, 20"x5, B HRA   Standing marching in // 2#, 3x10, R/L      therapeutic activities for 10 minutes:   Shuttle single limb leg press, R/L LE, 1 black cords, 2x10 each - held today 2/2 foot pain  [place] carries 5# BD x 2 laps around gym   Step ups 6" step, single HRA 2x10 R/L  Hesitation marches in // bars unilateral HRA 4 laps           Patient Education and Home Exercises     Home Exercises Provided and Patient Education Provided     Education provided:   -Exercise form and rationale     Written Home Exercises Provided: Patient instructed to cont prior HEP. Exercises were reviewed and Jessica was able to demonstrate them prior to the end of the session. " " Jessica demonstrated good  understanding of the education provided. See EMR under Patient Instructions for exercises provided during therapy sessions    ASSESSMENT   Resistance was increased with SLR with decreased extensor lag noted. Continued to focus on balance/CKC to help improve ADL performance.  Fear avoidance is noted with balance exercises involving reduced ASHLEY however appears to be improving slightly.    Jessica Is progressing well towards her goals.     Patient prognosis is Good.         Pt will continue to benefit from skilled outpatient physical therapy to address the deficits listed in the problem list box on initial evaluation, provide pt/family education and to maximize pt's level of independence in the home and community environment.     Pt's spiritual, cultural and educational needs considered and pt agreeable to plan of care and goals.       Anticipated Barriers for therapy: fear avoidance, chronicity of current condition, multiple tx areas       Goals:  Short Term Goals: 3 weeks   (1) patient will be I with HOME EXERCISE PROGRAM (MET, 6/28/22)  (2) patient will obtain 4+/5 R hip flexion MMT to facilitate improved ability to step over obstacles on ground with improved safety and efficiency (Progressing, not met 6/28/22)  (3) patient will walk X 50 ft while carrying #15 unilaterally to facilitate return to previous level of function for gardening (Progressing, not met 6/28/22)     Long Term Goals: 6 weeks   (1) patient will complete TUG, no AD, < 10" average to facilitate indicate decreased risk of fall during community mobility tasks(Progressing, not met 6/28/22)  (2) patient will negotiate uneven terrain while gardening X  100 ft, no loss of balance, to indicate decreased risk of fall (Progressing, not met 6/28/22)  (3) patient will tolerate walking dog 2 miles/day to facilitate return to previous level of function(Progressing, not met 6/28/22)      PLAN     Plan of care Certification: 6/28/22- " 8/28/22    Focus on balance/proprioception, LE strengthening with emphasis on ADL's/carrying objects.      Outpatient Physical Therapy 2 times weekly for 4 weeks to include the following interventions: Cervical/Lumbar Traction, Electrical Stimulation , re-eval, dry needling , Gait Training, Manual Therapy, Moist Heat/ Ice, Neuromuscular Re-ed, Patient Education, Self Care, Therapeutic Activities and Therapeutic Exercise.      Physical therapist and physical therapy assistant(s) met face to face to discuss patient's treatment plan and progress towards established goals. Pt will be seen by a physical therapist minimally every 6th visit or every 30 days.      Harsha Benitez, PTA

## 2022-07-06 ENCOUNTER — PATIENT MESSAGE (OUTPATIENT)
Dept: INTERNAL MEDICINE | Facility: CLINIC | Age: 72
End: 2022-07-06
Payer: MEDICARE

## 2022-07-06 ENCOUNTER — PATIENT OUTREACH (OUTPATIENT)
Dept: INTERNAL MEDICINE | Facility: CLINIC | Age: 72
End: 2022-07-06
Payer: MEDICARE

## 2022-07-08 ENCOUNTER — CLINICAL SUPPORT (OUTPATIENT)
Dept: REHABILITATION | Facility: OTHER | Age: 72
End: 2022-07-08
Payer: MEDICARE

## 2022-07-08 DIAGNOSIS — R26.2 DIFFICULTY WALKING: Primary | ICD-10-CM

## 2022-07-08 PROCEDURE — 97530 THERAPEUTIC ACTIVITIES: CPT | Mod: PN

## 2022-07-08 PROCEDURE — 97112 NEUROMUSCULAR REEDUCATION: CPT | Mod: PN

## 2022-07-08 PROCEDURE — 97110 THERAPEUTIC EXERCISES: CPT | Mod: PN

## 2022-07-08 NOTE — PROGRESS NOTES
"[facility]   Physical Therapy Daily Treatment Note    Name: Jessica Floyd  Clinic Number: [id number]    Therapy Diagnosis:   Encounter Diagnosis   Name Primary?    Difficulty walking Yes     Physician: Juan Dunaway DPM    Visit Date: 7/8/2022    Physician Orders: PT Eval and Treat   Medical Diagnosis from Referral: M77.9 (ICD-10-CM) - Capsulitis M24.50 (ICD-10-CM) - Joint contracture M24.573 (ICD-10-CM) - Equinus contracture of ankle   Evaluation Date: 4/27/2022  Authorization Period Expiration: 4/19/2023  Plan of Care Expiration: 6/28/22-8/28/22  Visit # / Visits authorized: 8/20  FOTO: #3/3 on 6/28/22     Precautions: Fall     Time In: 08:12am  Time Out: 09:57am  Total Billable Time: 45 minutes    SUBJECTIVE     She was compliant with home exercise program.  Response to previous treatment: R foot has started to hurt again    Function: able to care for 4 yr old grandchild during the day     Pain:  Current 1/10, worst 10/10, best 1/10   Location: L knee, R ankle/foot   Description: feels more unsteady when rushing and trying to get somewhere quickly   Aggravating Factors: worse with prolonged walking   Easing Factors: use of cane for confidence      Patients goals: build confidence in balance and improve mobility     OBJECTIVE   6/28/22:     Posture:  Forward flexed at hips, forward head, rounded shoulders      Lower Extremity Strength  Right LE Left LE   Knee extension: 4- Knee extension: 4-   Knee flexion: 4 Knee flexion: 4-   Hip flexion: 4 Hip flexion: 4-   Ankle dorsiflexion: 4 Ankle dorsiflexion: 4   Ankle plantarflexion: B 25 heel raises with B HRA Ankle plantarflexion:B 25 heel raises with B HRA        Neuro Dynamic Testing:   Slump:  R: negative   L; negative      Function/balance:   Sit - stand: 10 X in 30" - Pt utilizes B hands to push up from chair     TUG: (no AD)   Trial 1:12"  Trial 2: 13"  Trial 3: 13"  Average: 12.5"     Modified CTSIB:  Narrow ASHLEY, firm surface EO: " "CGA, steady, 30"  Narrow ASHLEY, firm surface EC: CGA, unsteady and fearful of letting go of // bars- fingertip assist  Narrow ASHLEY, unsteady surface EO: patient fearful and refuses to let go of // bars- 30" with fingertip assist    Tandem stand with R LE in front: 0" patient fearful and refuses to let go of // bars- 30" with fingertip assist  Tandem stand with L LE in front: 0"patient fearful and refuses to let go of // bars- 30" with fingertip assist     Limitation/Restriction for FOTO Ankle Survey     Therapist reviewed FOTO scores for Jessica Floyd on 6/28/22  FOTO documents entered into [employer] - see Media section.     Limitation Score: 31%  Predicted Score: 25%         Objective Measures updated at progress report unless specified.     Treatment   therapeutic exercises for 22 minutes:  Bridging 30x  Clams 30x R/L OTB   LTR on red ball x3'     SLR 1#, R/L 3x10    R/L LAQ 3#, 2' each LE  R/L LE hamstring curls #3, 2' each LE  Standing marching in // 3#, 2' each   Sit to stand with piliates ring squeeze 3x10     neuromuscular re-education for 15 minutes:     NBOS, firm surface, EO, 3x30 sec   Single limb stand 20" x3 R/L, B HRA   Tandem walk,  NBOS, firm surface, EC, 3x20 sec     therapeutic activities for 8 minutes:   Shuttle single limb leg press, R/L LE, 1 black cords, 2x10 each - held today 2/2 foot pain  [place] carries 5# BD x 2 laps around gym   Step ups 6" step, single HRA 3x10 R/L  Hesitation marches in // bars unilateral HRA 4 laps     Patient Education and Home Exercises     Home Exercises Provided and Patient Education Provided     Education provided:   -Exercise form and rationale     Home Exercises Provided: Patient instructed to cont prior HEP. Exercises were reviewed and Jessica was able to demonstrate them prior to the end of the session.  Jessica demonstrated good  understanding of the education provided. See EMR under Patient Instructions for exercises provided during therapy sessions    ASSESSMENT " "  Resistance was increased to #3 with hamstring curl, LAQ and marches today with patient able to complete using proper mechanics. Continued to focus on balance/CKC to help improve safety and efficiency with household and community mobility tasks.  Fear avoidance is noted with balance exercises involving reduced ASHLEY; however, appears to be improving slightly.    Jessica is making fair progress towards meeting set goals.     Patient prognosis is Good.       Pt will continue to benefit from skilled outpatient physical therapy to address the deficits listed in the problem list box on initial evaluation, provide pt/family education and to maximize pt's level of independence in the home and community environment.     Pt's spiritual, cultural and educational needs considered and pt agreeable to plan of care and goals.    Anticipated Barriers for therapy: fear avoidance, chronicity of current condition, multiple tx areas     Goals:  Short Term Goals: 3 weeks   (1) patient will be I with HOME EXERCISE PROGRAM (MET, 6/28/22)  (2) patient will obtain 4+/5 R hip flexion MMT to facilitate improved ability to step over obstacles on ground with improved safety and efficiency (Progressing, not met 6/28/22)  (3) patient will walk X 50 ft while carrying #15 unilaterally to facilitate return to previous level of function for gardening (Progressing, not met 6/28/22)     Long Term Goals: 6 weeks   (1) patient will complete TUG, no AD, < 10" average to facilitate indicate decreased risk of fall during community mobility tasks(Progressing, not met 6/28/22)  (2) patient will negotiate uneven terrain while gardening X  100 ft, no loss of balance, to indicate decreased risk of fall (Progressing, not met 6/28/22)  (3) patient will tolerate walking dog 2 miles/day to facilitate return to previous level of function(Progressing, not met 6/28/22)      PLAN     Plan of care Certification: 6/28/22- 8/28/22    Focus on balance/proprioception, LE " strengthening with emphasis on ADL's/carrying objects.      Outpatient Physical Therapy 2 times weekly for 4 weeks to include the following interventions: Cervical/Lumbar Traction, Electrical Stimulation , re-eval, dry needling , Gait Training, Manual Therapy, Moist Heat/ Ice, Neuromuscular Re-ed, Patient Education, Self Care, Therapeutic Activities and Therapeutic Exercise.      Physical therapist and physical therapy assistant(s) met face to face to discuss patient's treatment plan and progress towards established goals. Pt will be seen by a physical therapist minimally every 6th visit or every 30 days.      Mona Pierre, PT

## 2022-07-13 ENCOUNTER — OFFICE VISIT (OUTPATIENT)
Dept: PODIATRY | Facility: CLINIC | Age: 72
End: 2022-07-13
Payer: MEDICARE

## 2022-07-13 ENCOUNTER — LAB VISIT (OUTPATIENT)
Dept: LAB | Facility: HOSPITAL | Age: 72
End: 2022-07-13
Attending: PODIATRIST
Payer: MEDICARE

## 2022-07-13 ENCOUNTER — PATIENT MESSAGE (OUTPATIENT)
Dept: PODIATRY | Facility: CLINIC | Age: 72
End: 2022-07-13

## 2022-07-13 VITALS
HEIGHT: 64 IN | SYSTOLIC BLOOD PRESSURE: 145 MMHG | HEART RATE: 83 BPM | BODY MASS INDEX: 24.59 KG/M2 | DIASTOLIC BLOOD PRESSURE: 90 MMHG | WEIGHT: 144 LBS

## 2022-07-13 DIAGNOSIS — M79.671 FOOT PAIN, RIGHT: ICD-10-CM

## 2022-07-13 DIAGNOSIS — M20.41 HAMMER TOE OF RIGHT FOOT: ICD-10-CM

## 2022-07-13 DIAGNOSIS — M20.11 HALLUX VALGUS OF RIGHT FOOT: ICD-10-CM

## 2022-07-13 DIAGNOSIS — M10.00 ACUTE IDIOPATHIC GOUT, UNSPECIFIED SITE: ICD-10-CM

## 2022-07-13 DIAGNOSIS — M79.671 FOOT PAIN, RIGHT: Primary | ICD-10-CM

## 2022-07-13 LAB
ANION GAP SERPL CALC-SCNC: 13 MMOL/L (ref 8–16)
BUN SERPL-MCNC: 16 MG/DL (ref 8–23)
CALCIUM SERPL-MCNC: 10 MG/DL (ref 8.7–10.5)
CHLORIDE SERPL-SCNC: 89 MMOL/L (ref 95–110)
CO2 SERPL-SCNC: 30 MMOL/L (ref 23–29)
CREAT SERPL-MCNC: 1 MG/DL (ref 0.5–1.4)
EST. GFR  (AFRICAN AMERICAN): >60 ML/MIN/1.73 M^2
EST. GFR  (NON AFRICAN AMERICAN): 56.4 ML/MIN/1.73 M^2
GLUCOSE SERPL-MCNC: 90 MG/DL (ref 70–110)
POTASSIUM SERPL-SCNC: 3.5 MMOL/L (ref 3.5–5.1)
SODIUM SERPL-SCNC: 132 MMOL/L (ref 136–145)

## 2022-07-13 PROCEDURE — 36415 COLL VENOUS BLD VENIPUNCTURE: CPT | Mod: PN | Performed by: PODIATRIST

## 2022-07-13 PROCEDURE — 3288F FALL RISK ASSESSMENT DOCD: CPT | Mod: CPTII,S$GLB,, | Performed by: PODIATRIST

## 2022-07-13 PROCEDURE — 1101F PT FALLS ASSESS-DOCD LE1/YR: CPT | Mod: CPTII,S$GLB,, | Performed by: PODIATRIST

## 2022-07-13 PROCEDURE — 80048 BASIC METABOLIC PNL TOTAL CA: CPT | Performed by: PODIATRIST

## 2022-07-13 PROCEDURE — 99214 OFFICE O/P EST MOD 30 MIN: CPT | Mod: S$GLB,,, | Performed by: PODIATRIST

## 2022-07-13 PROCEDURE — 3044F PR MOST RECENT HEMOGLOBIN A1C LEVEL <7.0%: ICD-10-PCS | Mod: CPTII,S$GLB,, | Performed by: PODIATRIST

## 2022-07-13 PROCEDURE — 3008F PR BODY MASS INDEX (BMI) DOCUMENTED: ICD-10-PCS | Mod: CPTII,S$GLB,, | Performed by: PODIATRIST

## 2022-07-13 PROCEDURE — 3008F BODY MASS INDEX DOCD: CPT | Mod: CPTII,S$GLB,, | Performed by: PODIATRIST

## 2022-07-13 PROCEDURE — 99214 PR OFFICE/OUTPT VISIT, EST, LEVL IV, 30-39 MIN: ICD-10-PCS | Mod: S$GLB,,, | Performed by: PODIATRIST

## 2022-07-13 PROCEDURE — 1126F AMNT PAIN NOTED NONE PRSNT: CPT | Mod: CPTII,S$GLB,, | Performed by: PODIATRIST

## 2022-07-13 PROCEDURE — 99999 PR PBB SHADOW E&M-EST. PATIENT-LVL III: ICD-10-PCS | Mod: PBBFAC,,, | Performed by: PODIATRIST

## 2022-07-13 PROCEDURE — 3080F DIAST BP >= 90 MM HG: CPT | Mod: CPTII,S$GLB,, | Performed by: PODIATRIST

## 2022-07-13 PROCEDURE — 3077F PR MOST RECENT SYSTOLIC BLOOD PRESSURE >= 140 MM HG: ICD-10-PCS | Mod: CPTII,S$GLB,, | Performed by: PODIATRIST

## 2022-07-13 PROCEDURE — 1159F MED LIST DOCD IN RCRD: CPT | Mod: CPTII,S$GLB,, | Performed by: PODIATRIST

## 2022-07-13 PROCEDURE — 1101F PR PT FALLS ASSESS DOC 0-1 FALLS W/OUT INJ PAST YR: ICD-10-PCS | Mod: CPTII,S$GLB,, | Performed by: PODIATRIST

## 2022-07-13 PROCEDURE — 3080F PR MOST RECENT DIASTOLIC BLOOD PRESSURE >= 90 MM HG: ICD-10-PCS | Mod: CPTII,S$GLB,, | Performed by: PODIATRIST

## 2022-07-13 PROCEDURE — 3077F SYST BP >= 140 MM HG: CPT | Mod: CPTII,S$GLB,, | Performed by: PODIATRIST

## 2022-07-13 PROCEDURE — 1159F PR MEDICATION LIST DOCUMENTED IN MEDICAL RECORD: ICD-10-PCS | Mod: CPTII,S$GLB,, | Performed by: PODIATRIST

## 2022-07-13 PROCEDURE — 99999 PR PBB SHADOW E&M-EST. PATIENT-LVL III: CPT | Mod: PBBFAC,,, | Performed by: PODIATRIST

## 2022-07-13 PROCEDURE — 3044F HG A1C LEVEL LT 7.0%: CPT | Mod: CPTII,S$GLB,, | Performed by: PODIATRIST

## 2022-07-13 PROCEDURE — 1126F PR PAIN SEVERITY QUANTIFIED, NO PAIN PRESENT: ICD-10-PCS | Mod: CPTII,S$GLB,, | Performed by: PODIATRIST

## 2022-07-13 PROCEDURE — 3288F PR FALLS RISK ASSESSMENT DOCUMENTED: ICD-10-PCS | Mod: CPTII,S$GLB,, | Performed by: PODIATRIST

## 2022-07-13 RX ORDER — COLCHICINE 0.6 MG/1
0.6 TABLET ORAL DAILY
Qty: 15 TABLET | Refills: 0 | Status: SHIPPED | OUTPATIENT
Start: 2022-07-13 | End: 2022-08-17

## 2022-07-13 RX ORDER — METHYLPREDNISOLONE 4 MG/1
TABLET ORAL
Qty: 15 TABLET | Refills: 0 | Status: SHIPPED | OUTPATIENT
Start: 2022-07-13 | End: 2022-08-17

## 2022-07-13 NOTE — PROGRESS NOTES
Subjective:      Patient ID: Jessica Floyd is a 71 y.o. female.    Chief Complaint: Gout (Left great toe)    Sharp intense pain with redness left big toe.  Gradual onset, worsening over the past week.  Aggravated by touch, motion, shoes, walking.  No prior medical treatment.  No self-treatment.  Patient denies trauma surgery left big toe.  History confirmed gout attack 1 month ago opposite foot.    Review of Systems   Constitutional: Negative for chills, diaphoresis, fever, malaise/fatigue and night sweats.   Cardiovascular: Negative for claudication, cyanosis, leg swelling and syncope.   Skin: Negative for color change, dry skin, nail changes, rash, suspicious lesions and unusual hair distribution.   Musculoskeletal: Positive for gout, joint pain and joint swelling. Negative for falls, muscle cramps, muscle weakness and stiffness.   Gastrointestinal: Negative for constipation, diarrhea, nausea and vomiting.   Neurological: Negative for brief paralysis, disturbances in coordination, focal weakness, numbness, paresthesias, sensory change and tremors.           Objective:      Physical Exam  Constitutional:       General: She is not in acute distress.     Appearance: She is well-developed. She is not diaphoretic.   Cardiovascular:      Pulses:           Popliteal pulses are 2+ on the right side and 2+ on the left side.        Dorsalis pedis pulses are 2+ on the right side and 2+ on the left side.        Posterior tibial pulses are 2+ on the right side and 2+ on the left side.      Comments: Capillary refill 3 seconds all toes/distal feet, all toes/both feet warm to touch.      Negative lymphadenopathy bilateral popliteal fossa and tarsal tunnel.      Negavie lower extremity edema bilateral.    Musculoskeletal:      Right ankle: No swelling, deformity, ecchymosis or lacerations. Normal range of motion. Normal pulse.      Right Achilles Tendon: Normal. No defects. Lopez's test negative.      Comments: Intense pain  heat redness 1st toe left foot without gross deformity, loss of function, signs of acute trauma.    Compound digital deformities right and left right worse that are longstanding baseline for this patient post trauma and surgical repair many years ago.   Lymphadenopathy:      Lower Body: No right inguinal adenopathy. No left inguinal adenopathy.      Comments: Negative lymphadenopathy bilateral popliteal fossa and tarsal tunnel.    Negative lymphangitic streaking bilateral feet/ankles/legs.   Skin:     General: Skin is warm and dry.      Capillary Refill: Capillary refill takes 2 to 3 seconds.      Coloration: Skin is not pale.      Findings: No abrasion, bruising, burn, ecchymosis, erythema, laceration, lesion or rash.      Nails: There is no clubbing.      Comments:   Focal bright erythema left hallux without open skin drainage pus tracking fluctuance malodor or other cardinal signs of infection   Neurological:      Mental Status: She is alert and oriented to person, place, and time.      Sensory: No sensory deficit.      Motor: No tremor, atrophy or abnormal muscle tone.      Gait: Gait normal.      Comments: Negative tinel sign to percussion sural, superficial peroneal, deep peroneal, saphenous, and posterior tibial nerves right and left ankles and feet.    Negative allodynia both feet   Psychiatric:         Behavior: Behavior is cooperative.               Assessment:       Encounter Diagnoses   Name Primary?    Foot pain, right Yes    Acute idiopathic gout, unspecified site          Plan:       Jessica was seen today for gout.    Diagnoses and all orders for this visit:    Foot pain, right  -     Basic Metabolic Panel; Future  -     X-Ray Foot Complete Left; Future    Acute idiopathic gout, unspecified site  -     colchicine (COLCRYS) 0.6 mg tablet; Take 1 tablet (0.6 mg total) by mouth once daily.  -     Basic Metabolic Panel; Future  -     X-Ray Foot Complete Left; Future    Other orders  -      methylPREDNISolone (MEDROL DOSEPACK) 4 mg tablet; use as directed      I counseled the patient on her conditions, their implications and medical management.        Blood work, x-rays.    Colchicine, Medrol Dosepak.    Dispense surgical shoe left.  Ambulate to tolerance weaning to shoes as symptoms allow.    Hydrate well 3-4 L water daily.    Avoid high purine foods.    Follow 3 weeks, sooner p.r.n..          No follow-ups on file.

## 2022-07-20 ENCOUNTER — CLINICAL SUPPORT (OUTPATIENT)
Dept: REHABILITATION | Facility: OTHER | Age: 72
End: 2022-07-20
Payer: MEDICARE

## 2022-07-20 DIAGNOSIS — R26.2 DIFFICULTY WALKING: Primary | ICD-10-CM

## 2022-07-20 PROCEDURE — 97530 THERAPEUTIC ACTIVITIES: CPT | Mod: PN

## 2022-07-20 PROCEDURE — 97112 NEUROMUSCULAR REEDUCATION: CPT | Mod: PN

## 2022-07-20 PROCEDURE — 97110 THERAPEUTIC EXERCISES: CPT | Mod: PN

## 2022-07-20 NOTE — PROGRESS NOTES
"[facility]   Physical Therapy Discharge Summary    Name: Jessica Floyd  Clinic Number: [id number]    Therapy Diagnosis:   Encounter Diagnosis   Name Primary?    Difficulty walking Yes     Physician: Juan Dunaway DPM    Visit Date: 7/20/2022    Physician Orders: PT Eval and Treat   Medical Diagnosis from Referral: M77.9 (ICD-10-CM) - Capsulitis M24.50 (ICD-10-CM) - Joint contracture M24.573 (ICD-10-CM) - Equinus contracture of ankle   Evaluation Date: 4/27/2022  Authorization Period Expiration: 4/19/2023  Plan of Care Expiration: 6/28/22-8/28/22  Visit # / Visits authorized: 8/20  FOTO: #3/3 on 6/28/22     Precautions: Fall     Time In: 08:12am  Time Out: 09:57am  Total Billable Time: 45 minutes    SUBJECTIVE     She was compliant with home exercise program.  Response to previous treatment: R foot has started to hurt again    Function: able to care for 4 yr old grandchild during the day     Pain:  Current 1/10, worst 10/10, best 1/10   Location: L knee, R ankle/foot   Description: feels more unsteady when rushing and trying to get somewhere quickly   Aggravating Factors: worse with prolonged walking   Easing Factors: use of cane for confidence      Patients goals: build confidence in balance and improve mobility     OBJECTIVE   7/20/22:     Posture:  Forward flexed at hips, forward head, rounded shoulders      Lower Extremity Strength  Right LE Left LE   Knee extension: 4- Knee extension: 4-   Knee flexion: 4 Knee flexion: 4-   Hip flexion: 4 Hip flexion: 4-   Ankle dorsiflexion: 4 Ankle dorsiflexion: 4   Ankle plantarflexion: B 25 heel raises with B HRA Ankle plantarflexion:B 25 heel raises with B HRA        Neuro Dynamic Testing:   Slump:  R: negative   L; negative      Function/balance:   Sit - stand: 12 X in 30" - Pt utilizes B hands to push up from chair     TUG: (no AD)   Trial 1:12"  Trial 2: 13"  Trial 3: 13"  Average: 12.5"     Modified CTSIB:  Narrow ASHLEY, firm surface EO: " "CGA, steady, 30"  Narrow ASHLEY, firm surface EC: CGA, unsteady and fearful of letting go of // bars- fingertip assist  Narrow ASHLEY, unsteady surface EO: patient fearful and refuses to let go of // bars- 30" with fingertip assist    Tandem stand with R LE in front: 0" patient fearful and refuses to let go of // bars- 30" with fingertip assist  Tandem stand with L LE in front: 0"patient fearful and refuses to let go of // bars- 30" with fingertip assist     Limitation/Restriction for FOTO Ankle Survey     Therapist reviewed FOTO scores for Jessica Floyd on 7/20/22  FOTO documents entered into [employer] - see Media section.     Limitation Score: 31%  Predicted Score: 25%         Objective Measures updated at progress report unless specified.     Treatment   therapeutic exercises for 30 minutes:  Bridging 30x  Clams 30x R/L OTB   LTR on red ball x3'     SLR 1#, R/L 3x10    R/L LAQ 3#, 2' each LE  R/L LE hamstring curls #3, 2' each LE  Standing marching in // 3#, 2' each    Sit to stand with piliates ring squeeze 3x10     neuromuscular re-education for 15 minutes:     NBOS, firm surface, EO, 3x30 sec   Single limb stand 20" x3 R/L, B HRA   Tandem walk,   NBOS, firm surface, EC, 3x20 sec     therapeutic activities for 8 minutes:   Shuttle single limb leg press, R/L LE, 1 black cords, 2x10 each - held today 2/2 foot pain  [place] carries 5# BD x 2 laps around gym   Step ups 6" step, single HRA 3x10 R/L  Hesitation marches in // bars unilateral HRA 4 laps     Patient Education and Home Exercises     Home Exercises Provided and Patient Education Provided     Education provided:   -Exercise form and rationale     Home Exercises Provided: Patient instructed to cont prior HEP. Exercises were reviewed and Jessica was able to demonstrate them prior to the end of the session.  Jessica demonstrated good  understanding of the education provided. See EMR under Patient Instructions for exercises provided during therapy sessions    ASSESSMENT " "  Discharge patient today. Patient has partially/fully met their short and long term goals. PT educated pt on progression of home exercise program and pt demonstrated understanding. All questions/concerns were answered/addressed by PT.      Jessica is making fair progress towards meeting set goals.     Patient prognosis is Good.       Pt will continue to benefit from skilled outpatient physical therapy to address the deficits listed in the problem list box on initial evaluation, provide pt/family education and to maximize pt's level of independence in the home and community environment.     Pt's spiritual, cultural and educational needs considered and pt agreeable to plan of care and goals.    Anticipated Barriers for therapy: fear avoidance, chronicity of current condition, multiple tx areas     Goals:  Short Term Goals: 3 weeks   (1) patient will be I with HOME EXERCISE PROGRAM (MET, 6/28/22)  (2) patient will obtain 4+/5 R hip flexion MMT to facilitate improved ability to step over obstacles on ground with improved safety and efficiency (MET)  (3) patient will walk X 50 ft while carrying #15 unilaterally to facilitate return to previous level of function for gardening (MET)     Long Term Goals: 6 weeks   (1) patient will complete TUG, no AD, < 10" average to facilitate indicate decreased risk of fall during community mobility tasks(MET)  (2) patient will negotiate uneven terrain while gardening X  100 ft, no loss of balance, to indicate decreased risk of fall (MET)  (3) patient will tolerate walking dog 2 miles/day to facilitate return to previous level of function(MET)    PLAN     Discharge patient today. Patient has partially/fully met their short and long term goals. PT educated pt on progression of home exercise program and pt demonstrated understanding. All questions/concerns were answered/addressed by PT.      Shazia Elkins, PT        "

## 2022-07-27 ENCOUNTER — OFFICE VISIT (OUTPATIENT)
Dept: PODIATRY | Facility: CLINIC | Age: 72
End: 2022-07-27
Payer: MEDICARE

## 2022-07-27 VITALS
BODY MASS INDEX: 24.59 KG/M2 | HEIGHT: 64 IN | HEART RATE: 89 BPM | DIASTOLIC BLOOD PRESSURE: 75 MMHG | SYSTOLIC BLOOD PRESSURE: 130 MMHG | WEIGHT: 144 LBS

## 2022-07-27 DIAGNOSIS — M10.00 ACUTE IDIOPATHIC GOUT, UNSPECIFIED SITE: ICD-10-CM

## 2022-07-27 DIAGNOSIS — M79.671 FOOT PAIN, RIGHT: Primary | ICD-10-CM

## 2022-07-27 PROCEDURE — 3008F BODY MASS INDEX DOCD: CPT | Mod: CPTII,S$GLB,, | Performed by: PODIATRIST

## 2022-07-27 PROCEDURE — 1159F PR MEDICATION LIST DOCUMENTED IN MEDICAL RECORD: ICD-10-PCS | Mod: CPTII,S$GLB,, | Performed by: PODIATRIST

## 2022-07-27 PROCEDURE — 1126F AMNT PAIN NOTED NONE PRSNT: CPT | Mod: CPTII,S$GLB,, | Performed by: PODIATRIST

## 2022-07-27 PROCEDURE — 3075F PR MOST RECENT SYSTOLIC BLOOD PRESS GE 130-139MM HG: ICD-10-PCS | Mod: CPTII,S$GLB,, | Performed by: PODIATRIST

## 2022-07-27 PROCEDURE — 3044F HG A1C LEVEL LT 7.0%: CPT | Mod: CPTII,S$GLB,, | Performed by: PODIATRIST

## 2022-07-27 PROCEDURE — 3078F PR MOST RECENT DIASTOLIC BLOOD PRESSURE < 80 MM HG: ICD-10-PCS | Mod: CPTII,S$GLB,, | Performed by: PODIATRIST

## 2022-07-27 PROCEDURE — 3288F PR FALLS RISK ASSESSMENT DOCUMENTED: ICD-10-PCS | Mod: CPTII,S$GLB,, | Performed by: PODIATRIST

## 2022-07-27 PROCEDURE — 3078F DIAST BP <80 MM HG: CPT | Mod: CPTII,S$GLB,, | Performed by: PODIATRIST

## 2022-07-27 PROCEDURE — 99999 PR PBB SHADOW E&M-EST. PATIENT-LVL IV: ICD-10-PCS | Mod: PBBFAC,,, | Performed by: PODIATRIST

## 2022-07-27 PROCEDURE — 1126F PR PAIN SEVERITY QUANTIFIED, NO PAIN PRESENT: ICD-10-PCS | Mod: CPTII,S$GLB,, | Performed by: PODIATRIST

## 2022-07-27 PROCEDURE — 1160F PR REVIEW ALL MEDS BY PRESCRIBER/CLIN PHARMACIST DOCUMENTED: ICD-10-PCS | Mod: CPTII,S$GLB,, | Performed by: PODIATRIST

## 2022-07-27 PROCEDURE — 1101F PR PT FALLS ASSESS DOC 0-1 FALLS W/OUT INJ PAST YR: ICD-10-PCS | Mod: CPTII,S$GLB,, | Performed by: PODIATRIST

## 2022-07-27 PROCEDURE — 1159F MED LIST DOCD IN RCRD: CPT | Mod: CPTII,S$GLB,, | Performed by: PODIATRIST

## 2022-07-27 PROCEDURE — 3075F SYST BP GE 130 - 139MM HG: CPT | Mod: CPTII,S$GLB,, | Performed by: PODIATRIST

## 2022-07-27 PROCEDURE — 99212 PR OFFICE/OUTPT VISIT, EST, LEVL II, 10-19 MIN: ICD-10-PCS | Mod: S$GLB,,, | Performed by: PODIATRIST

## 2022-07-27 PROCEDURE — 3288F FALL RISK ASSESSMENT DOCD: CPT | Mod: CPTII,S$GLB,, | Performed by: PODIATRIST

## 2022-07-27 PROCEDURE — 1101F PT FALLS ASSESS-DOCD LE1/YR: CPT | Mod: CPTII,S$GLB,, | Performed by: PODIATRIST

## 2022-07-27 PROCEDURE — 3008F PR BODY MASS INDEX (BMI) DOCUMENTED: ICD-10-PCS | Mod: CPTII,S$GLB,, | Performed by: PODIATRIST

## 2022-07-27 PROCEDURE — 99999 PR PBB SHADOW E&M-EST. PATIENT-LVL IV: CPT | Mod: PBBFAC,,, | Performed by: PODIATRIST

## 2022-07-27 PROCEDURE — 3044F PR MOST RECENT HEMOGLOBIN A1C LEVEL <7.0%: ICD-10-PCS | Mod: CPTII,S$GLB,, | Performed by: PODIATRIST

## 2022-07-27 PROCEDURE — 1160F RVW MEDS BY RX/DR IN RCRD: CPT | Mod: CPTII,S$GLB,, | Performed by: PODIATRIST

## 2022-07-27 PROCEDURE — 99212 OFFICE O/P EST SF 10 MIN: CPT | Mod: S$GLB,,, | Performed by: PODIATRIST

## 2022-07-27 NOTE — PROGRESS NOTES
Subjective:      Patient ID: Jessica Floyd is a 71 y.o. female.    Chief Complaint: Foot Pain    Sharp intense pain with redness left big toe.  Gradual onset, improving to resolution over the past 2 weeks.  .  Patient denies trauma surgery left big toe.  History confirmed gout attack 1 month ago opposite foot.    Review of Systems   Constitutional: Negative for chills, diaphoresis, fever, malaise/fatigue and night sweats.   Cardiovascular: Negative for claudication, cyanosis, leg swelling and syncope.   Skin: Negative for color change, dry skin, nail changes, rash, suspicious lesions and unusual hair distribution.   Musculoskeletal: Negative for falls, gout, joint pain, joint swelling, muscle cramps, muscle weakness and stiffness.   Gastrointestinal: Negative for constipation, diarrhea, nausea and vomiting.   Neurological: Negative for brief paralysis, disturbances in coordination, focal weakness, numbness, paresthesias, sensory change and tremors.           Objective:      Physical Exam  Constitutional:       General: She is not in acute distress.     Appearance: She is well-developed. She is not diaphoretic.   Cardiovascular:      Pulses:           Popliteal pulses are 2+ on the right side and 2+ on the left side.        Dorsalis pedis pulses are 2+ on the right side and 2+ on the left side.        Posterior tibial pulses are 2+ on the right side and 2+ on the left side.      Comments: Capillary refill 3 seconds all toes/distal feet, all toes/both feet warm to touch.      Negative lymphadenopathy bilateral popliteal fossa and tarsal tunnel.      Negavie lower extremity edema bilateral.    Musculoskeletal:      Right ankle: No swelling, deformity, ecchymosis or lacerations. Normal range of motion. Normal pulse.      Right Achilles Tendon: Normal. No defects. Lopez's test negative.      Comments: No current pain 1st toe left foot without  loss of function, signs of acute trauma.    Compound digital deformities  right and left right worse that are longstanding baseline for this patient post trauma and surgical repair many years ago.   Lymphadenopathy:      Lower Body: No right inguinal adenopathy. No left inguinal adenopathy.      Comments: Negative lymphadenopathy bilateral popliteal fossa and tarsal tunnel.    Negative lymphangitic streaking bilateral feet/ankles/legs.   Skin:     General: Skin is warm and dry.      Capillary Refill: Capillary refill takes 2 to 3 seconds.      Coloration: Skin is not pale.      Findings: No abrasion, bruising, burn, ecchymosis, erythema, laceration, lesion or rash.      Nails: There is no clubbing.      Comments:   Skin is normal age and health appropriate color, turgor, texture, and temperature bilateral lower extremities without ulceration, hyperpigmentation, discoloration, masses nodules or cords palpated.  No ecchymosis, erythema, edema, or cardinal signs of infection bilateral lower extremities.     Neurological:      Mental Status: She is alert and oriented to person, place, and time.      Sensory: No sensory deficit.      Motor: No tremor, atrophy or abnormal muscle tone.      Gait: Gait normal.      Comments: Negative tinel sign to percussion sural, superficial peroneal, deep peroneal, saphenous, and posterior tibial nerves right and left ankles and feet.    Negative allodynia both feet   Psychiatric:         Behavior: Behavior is cooperative.               Assessment:       Encounter Diagnoses   Name Primary?    Foot pain, right Yes    Acute idiopathic gout, unspecified site          Plan:       Jessica was seen today for foot pain.    Diagnoses and all orders for this visit:    Foot pain, right    Acute idiopathic gout, unspecified site      I counseled the patient on her conditions, their implications and medical management.        To tolerance and shoes of choice.    Hydrate well 3 L or so of water daily.     p.r.n..          No follow-ups on file.

## 2022-08-17 ENCOUNTER — TELEPHONE (OUTPATIENT)
Dept: RHEUMATOLOGY | Facility: CLINIC | Age: 72
End: 2022-08-17

## 2022-08-17 ENCOUNTER — OFFICE VISIT (OUTPATIENT)
Dept: RHEUMATOLOGY | Facility: CLINIC | Age: 72
End: 2022-08-17
Payer: MEDICARE

## 2022-08-17 VITALS
HEART RATE: 80 BPM | HEIGHT: 64 IN | BODY MASS INDEX: 24.05 KG/M2 | DIASTOLIC BLOOD PRESSURE: 88 MMHG | WEIGHT: 140.88 LBS | OXYGEN SATURATION: 95 % | SYSTOLIC BLOOD PRESSURE: 135 MMHG

## 2022-08-17 DIAGNOSIS — Z71.89 COUNSELING AND COORDINATION OF CARE: ICD-10-CM

## 2022-08-17 DIAGNOSIS — N18.31 STAGE 3A CHRONIC KIDNEY DISEASE: ICD-10-CM

## 2022-08-17 DIAGNOSIS — M15.9 PRIMARY OSTEOARTHRITIS INVOLVING MULTIPLE JOINTS: ICD-10-CM

## 2022-08-17 DIAGNOSIS — M1A.9XX0 CHRONIC GOUT WITHOUT TOPHUS, UNSPECIFIED CAUSE, UNSPECIFIED SITE: Primary | ICD-10-CM

## 2022-08-17 DIAGNOSIS — Z79.899 ENCOUNTER FOR LONG-TERM (CURRENT) USE OF OTHER MEDICATIONS: ICD-10-CM

## 2022-08-17 PROCEDURE — 3008F PR BODY MASS INDEX (BMI) DOCUMENTED: ICD-10-PCS | Mod: CPTII,S$GLB,, | Performed by: INTERNAL MEDICINE

## 2022-08-17 PROCEDURE — 3044F HG A1C LEVEL LT 7.0%: CPT | Mod: CPTII,S$GLB,, | Performed by: INTERNAL MEDICINE

## 2022-08-17 PROCEDURE — 99999 PR PBB SHADOW E&M-EST. PATIENT-LVL IV: CPT | Mod: PBBFAC,,, | Performed by: INTERNAL MEDICINE

## 2022-08-17 PROCEDURE — 1126F PR PAIN SEVERITY QUANTIFIED, NO PAIN PRESENT: ICD-10-PCS | Mod: CPTII,S$GLB,, | Performed by: INTERNAL MEDICINE

## 2022-08-17 PROCEDURE — 99499 RISK ADDL DX/OHS AUDIT: ICD-10-PCS | Mod: S$GLB,,, | Performed by: INTERNAL MEDICINE

## 2022-08-17 PROCEDURE — 1159F PR MEDICATION LIST DOCUMENTED IN MEDICAL RECORD: ICD-10-PCS | Mod: CPTII,S$GLB,, | Performed by: INTERNAL MEDICINE

## 2022-08-17 PROCEDURE — 99204 OFFICE O/P NEW MOD 45 MIN: CPT | Mod: 25,S$GLB,, | Performed by: INTERNAL MEDICINE

## 2022-08-17 PROCEDURE — 3008F BODY MASS INDEX DOCD: CPT | Mod: CPTII,S$GLB,, | Performed by: INTERNAL MEDICINE

## 2022-08-17 PROCEDURE — 1101F PR PT FALLS ASSESS DOC 0-1 FALLS W/OUT INJ PAST YR: ICD-10-PCS | Mod: CPTII,S$GLB,, | Performed by: INTERNAL MEDICINE

## 2022-08-17 PROCEDURE — 1159F MED LIST DOCD IN RCRD: CPT | Mod: CPTII,S$GLB,, | Performed by: INTERNAL MEDICINE

## 2022-08-17 PROCEDURE — 3288F PR FALLS RISK ASSESSMENT DOCUMENTED: ICD-10-PCS | Mod: CPTII,S$GLB,, | Performed by: INTERNAL MEDICINE

## 2022-08-17 PROCEDURE — 99204 PR OFFICE/OUTPT VISIT, NEW, LEVL IV, 45-59 MIN: ICD-10-PCS | Mod: 25,S$GLB,, | Performed by: INTERNAL MEDICINE

## 2022-08-17 PROCEDURE — 20600 DRAIN/INJ JOINT/BURSA W/O US: CPT | Mod: F5,S$GLB,, | Performed by: INTERNAL MEDICINE

## 2022-08-17 PROCEDURE — 1126F AMNT PAIN NOTED NONE PRSNT: CPT | Mod: CPTII,S$GLB,, | Performed by: INTERNAL MEDICINE

## 2022-08-17 PROCEDURE — 3288F FALL RISK ASSESSMENT DOCD: CPT | Mod: CPTII,S$GLB,, | Performed by: INTERNAL MEDICINE

## 2022-08-17 PROCEDURE — 1101F PT FALLS ASSESS-DOCD LE1/YR: CPT | Mod: CPTII,S$GLB,, | Performed by: INTERNAL MEDICINE

## 2022-08-17 PROCEDURE — 3044F PR MOST RECENT HEMOGLOBIN A1C LEVEL <7.0%: ICD-10-PCS | Mod: CPTII,S$GLB,, | Performed by: INTERNAL MEDICINE

## 2022-08-17 PROCEDURE — 99999 PR PBB SHADOW E&M-EST. PATIENT-LVL IV: ICD-10-PCS | Mod: PBBFAC,,, | Performed by: INTERNAL MEDICINE

## 2022-08-17 PROCEDURE — 99499 UNLISTED E&M SERVICE: CPT | Mod: S$GLB,,, | Performed by: INTERNAL MEDICINE

## 2022-08-17 PROCEDURE — 20600 SMALL JOINT ASPIRATION/INJECTION: R THUMB CMC: ICD-10-PCS | Mod: F5,S$GLB,, | Performed by: INTERNAL MEDICINE

## 2022-08-17 RX ORDER — ALLOPURINOL 100 MG/1
100 TABLET ORAL DAILY
Qty: 30 TABLET | Refills: 11 | Status: SHIPPED | OUTPATIENT
Start: 2022-08-17 | End: 2022-08-17 | Stop reason: SDUPTHER

## 2022-08-17 RX ORDER — COLCHICINE 0.6 MG/1
0.6 TABLET ORAL DAILY
Qty: 30 TABLET | Refills: 11 | Status: SHIPPED | OUTPATIENT
Start: 2022-08-17 | End: 2022-08-19

## 2022-08-17 RX ORDER — METHYLPREDNISOLONE 4 MG/1
TABLET ORAL
Qty: 1 EACH | Refills: 0 | Status: SHIPPED | OUTPATIENT
Start: 2022-08-17 | End: 2023-01-10

## 2022-08-17 RX ORDER — LIDOCAINE HYDROCHLORIDE 10 MG/ML
1 INJECTION INFILTRATION; PERINEURAL
Status: DISCONTINUED | OUTPATIENT
Start: 2022-08-17 | End: 2022-08-17 | Stop reason: HOSPADM

## 2022-08-17 RX ORDER — ALLOPURINOL 100 MG/1
50 TABLET ORAL DAILY
Qty: 30 TABLET | Refills: 11 | Status: SHIPPED | OUTPATIENT
Start: 2022-08-17 | End: 2023-02-28 | Stop reason: SDUPTHER

## 2022-08-17 RX ORDER — TRIAMCINOLONE ACETONIDE 40 MG/ML
40 INJECTION, SUSPENSION INTRA-ARTICULAR; INTRAMUSCULAR
Status: DISCONTINUED | OUTPATIENT
Start: 2022-08-17 | End: 2022-08-17 | Stop reason: HOSPADM

## 2022-08-17 RX ADMIN — LIDOCAINE HYDROCHLORIDE 1 ML: 10 INJECTION INFILTRATION; PERINEURAL at 09:08

## 2022-08-17 RX ADMIN — TRIAMCINOLONE ACETONIDE 40 MG: 40 INJECTION, SUSPENSION INTRA-ARTICULAR; INTRAMUSCULAR at 09:08

## 2022-08-17 NOTE — PROGRESS NOTES
RHEUMATOLOGY OUTPATIENT CLINIC NOTE    8/17/2022    Attending Rheumatologist: Juan Naranjo  Primary Care Provider: Effie Olmedo MD   Physician Requesting Consultation: Effie Olmedo MD  [address] [address]  Chief Complaint/Reason For Consultation:  No chief complaint on file.      Subjective:       HPI  Jessica Floyd is a 72 y.o. White female with medical history noted below who presents for evaluation of joint pain.     Patient presents for evaluation of joint pain. She notes chronic joint pain over many years with progression over the last year. She notes recent trauma to her left hand when trying to save her  from falling from a chair. She also notes paraesthesias of the right hand. She also notes her first episode of gout in her left toe. Unclear precipitant. No Hx of Stones. No FHX of Gout. She notes joint pain in her ankles, knees, back. She notes that activity precipitates the pain. Rest and Tylenol Helps. Occasional wrist swelling. Morning stiffness < 10 minutes. Has noted some wt loss since taking care of her 4yo grand daughter. Denies other systemic complaints.       Review of Systems   Constitutional: Negative for chills, fatigue, fever and unexpected weight change.   HENT: Negative for mouth sores.    Eyes: Negative for redness and eye dryness.   Respiratory: Negative for cough and shortness of breath.    Cardiovascular: Negative for chest pain.   Gastrointestinal: Negative for abdominal distention, constipation, diarrhea, nausea and vomiting.   Genitourinary: Negative for vaginal dryness.   Musculoskeletal: Positive for arthralgias, joint swelling and leg pain. Negative for back pain, gait problem, myalgias, neck pain, neck stiffness and joint deformity.   Integumentary:  Negative for rash.   Neurological: Negative for weakness, numbness and headaches.   Hematological: Negative for adenopathy. Does not bruise/bleed easily.    Psychiatric/Behavioral: Negative for confusion, decreased concentration and sleep disturbance. The patient is not nervous/anxious.    All other systems reviewed and are negative.       Chronic comorbid conditions affecting medical decision making today:  Past Medical History:   Diagnosis Date    Allergic rhinitis     Carotid stenosis     Coronary atherosclerosis     [facility] 2014: 20% mLAD, 50% mCx, 20%, mRCA    Dyslipidemia     ESBL (extended spectrum beta-lactamase) producing bacteria infection     UTI    Hypertension     Nausea and vomiting 2017    Subarachnoid hemorrhage due to ruptured aneurysm      Past Surgical History:   Procedure Laterality Date    ADENOIDECTOMY      ANTERIOR CRUCIATE LIGAMENT REPAIR      APPENDECTOMY      CEREBRAL ANEURYSM REPAIR      clip     SECTION      DENTAL SURGERY      TONSILLECTOMY       Family History   Problem Relation Age of Onset    Hypertension Mother     Aneurysm Mother     Hypertension Father     Alcohol abuse Father     Kidney disease Brother     Heart disease Brother     Gout Brother     No Known Problems Daughter     No Known Problems Daughter     No Known Problems Daughter      Social History     Substance and Sexual Activity   Alcohol Use Yes    Alcohol/week: 7.0 standard drinks    Types: 7 Glasses of wine per week     Social History     Tobacco Use   Smoking Status Former Smoker    Packs/day: 0.50    Years: 20.00    Pack years: 10.00    Quit date: 1999    Years since quittin.6   Smokeless Tobacco Never Used     Social History     Substance and Sexual Activity   Drug Use No       Current Outpatient Medications:     amLODIPine (NORVASC) 10 MG tablet, TAKE 1 TABLET BY MOUTH EVERY DAY, Disp: 90 tablet, Rfl: 3    atorvastatin (LIPITOR) 80 MG tablet, TAKE 1 TABLET BY MOUTH EVERY DAY, Disp: 90 tablet, Rfl: 3    carvediloL (COREG) 12.5 MG tablet, TAKE 1 TABLET BY MOUTH TWICE A DAY WITH MEALS, Disp:  180 tablet, Rfl: 3    diazePAM (VALIUM) 10 MG Tab, TAKE 1 TABLET BY MOUTH 1 HOUR PRIOR TO APPT, Disp: , Rfl:     fluticasone propionate (FLONASE) 50 mcg/actuation nasal spray, SPRAY ONCE INTO EACH NOSTRIL ONCE DAILY, Disp: 16 mL, Rfl: 3    hydroCHLOROthiazide (HYDRODIURIL) 25 MG tablet, TAKE 1 TABLET BY MOUTH EVERY DAY, Disp: 90 tablet, Rfl: 3    HYDROcodone-acetaminophen (NORCO) 5-325 mg per tablet, Take 1 tablet by mouth every 4 to 6 hours as needed., Disp: , Rfl:     hydrocortisone 2.5 % ointment, as needed., Disp: , Rfl:     ketoconazole (NIZORAL) 2 % cream, APPLY BETWEEN TOES TWICE DAILY X 2 WKS, Disp: , Rfl:     montelukast (SINGULAIR) 10 mg tablet, Take 1 tablet (10 mg total) by mouth every evening., Disp: 90 tablet, Rfl: 1    pantoprazole (PROTONIX) 40 MG tablet, Take 1 tablet (40 mg total) by mouth once daily., Disp: 90 tablet, Rfl: 3    promethazine (PHENERGAN) 25 MG tablet, TAKE 1 TABLET BY MOUTH EVERY 4 TO 6 HOURS AS NEEDED FOR NAUSEA AND VOMITING, Disp: , Rfl:     allopurinoL (ZYLOPRIM) 100 MG tablet, Take 1 tablet (100 mg total) by mouth once daily., Disp: 30 tablet, Rfl: 11    amoxicillin (AMOXIL) 500 MG capsule, TAKE 2 CAPSULES BY MOUTH NOW, THEN ONE CAPSULE THREE TIMES A DAY UNTIL COMPLETELY FINISHED, Disp: , Rfl:     aspirin (ECOTRIN) 81 MG EC tablet, Take 1 tablet (81 mg total) by mouth once daily., Disp: , Rfl: 0    colchicine (COLCRYS) 0.6 mg tablet, Take 1 tablet (0.6 mg total) by mouth once daily., Disp: 30 tablet, Rfl: 11    methylPREDNISolone (MEDROL DOSEPACK) 4 mg tablet, use as directed, Disp: 1 each, Rfl: 0     Objective:         Vitals:    08/17/22 0933   BP: 135/88   Pulse: 80     Physical Exam   Musculoskeletal:      Comments: Can make fist, no synovitis  Right 1st CMC TTP  Heberden and malissa nodes  Knee crepitus  Negative ankle/MTP with hallux valgus right side  No tender points        Reviewed old and all outside pertinent medical records available.    All lab  results personally reviewed and interpreted by me.  Lab Results   Component Value Date    WBC 7.82 06/15/2022    HGB 12.1 06/15/2022    HCT 35.5 (L) 06/15/2022     (H) 06/15/2022    MCH 35.0 (H) 06/15/2022    MCHC 34.1 06/15/2022    RDW 14.4 06/15/2022     06/15/2022    MPV 10.9 06/15/2022       Lab Results   Component Value Date     (L) 07/13/2022    K 3.5 07/13/2022    CL 89 (L) 07/13/2022    CO2 30 (H) 07/13/2022    GLU 90 07/13/2022    BUN 16 07/13/2022    CALCIUM 10.0 07/13/2022    PROT 7.2 04/05/2022    ALBUMIN 3.8 04/05/2022    BILITOT 0.7 04/05/2022    AST 27 04/05/2022    ALKPHOS 69 04/05/2022    ALT 20 04/05/2022       Lab Results   Component Value Date    COLORU Yellow 03/20/2017    APPEARANCEUA Clear 03/20/2017    SPECGRAV 1.010 03/20/2017    PHUR 6.0 03/20/2017    PROTEINUA Negative 03/20/2017    KETONESU Negative 03/20/2017    LEUKOCYTESUR Trace (A) 03/20/2017    NITRITE Negative 03/20/2017    UROBILINOGEN Negative 03/20/2017       Lab Results   Component Value Date    CRP 13.0 (H) 06/15/2022       Lab Results   Component Value Date    SEDRATE 46 (H) 06/15/2022       Lab Results   Component Value Date    RF <13.0 05/11/2022    SEDRATE 46 (H) 06/15/2022       No components found for: 25OHVITDTOT, 27KFWNSP3, 48ZDIIRX5, METHODNOTE    Lab Results   Component Value Date    URICACID 10.1 (H) 06/15/2022       No components found for: TSPOTTB        Imaging:  All imaging reviewed and independently interpreted by me.         ASSESSMENT / PLAN:     Jessica Floyd is a 72 y.o. White female with:      1. Chronic gout without tophus, unspecified cause, unspecified site  - patient with recent Gout attack, +CKD3, no Stones or FHX, unknown triggers  - discussed diagnosis and management  - start Allopurinol 50mg daily and Colchicine 0.6mg daily, SE discussed  - gout diet discussed  - baseline Hand Xrays   - labs prior to next visit  - Medrol pack PRN flare   - reassurance  - X-Ray Hand 2 View Bilat;  Future  - CBC Auto Differential; Future  - Comprehensive Metabolic Panel; Future  - Uric Acid; Future  - colchicine (COLCRYS) 0.6 mg tablet; Take 1 tablet (0.6 mg total) by mouth once daily.  Dispense: 30 tablet; Refill: 11  - allopurinoL (ZYLOPRIM) 100 MG tablet; Take 1/2 tablet (50 mg total) by mouth once daily.  Dispense: 30 tablet; Refill: 11  - methylPREDNISolone (MEDROL DOSEPACK) 4 mg tablet; use as directed  Dispense: 1 each; Refill: 0    2. Primary osteoarthritis involving multiple joints  - in addition to Gout she has OA  - discussed diagnosis and management  - CSI to Right 1st CMC, See procedure note, Ice area   - Tylenol PRN  - discussed alternative therapies such as Epson salts, Paraffin Wax and Topical Voltaren  - reassurance and exercise     3. Stage 3a chronic kidney disease  - noted  - renally dosed meds     4. Encounter for long-term (current) use of other medications  - discussed medication SE and interactions  - monitor labs     5. Other specified counseling  - over 10 minutes spent regarding below topics:  - Immunization counseling done.  - Weight loss counseling done.  - Nutrition and exercise counseling.  - Limitation of alcohol consumption.  - Regular exercise:  Aerobic and resistance.  - Medication counseling provided.      Follow up in about 8 weeks (around 10/12/2022).    Method of contact with patient concerns: [facility]    Disclaimer:  This note is prepared using voice recognition software and as such is likely to have errors and has not been proof read. Please contact me for questions.     Time spent: 45 minutes in face to face discussion concerning diagnosis, prognosis, review of lab and test results, benefits of treatment as well as management of disease, counseling of patient and coordination of care between various health care providers.  Greater than half the time spent was used for coordination of care and counseling of patient.    Juan Naranjo,  M.D.  Rheumatology Department   [facility]

## 2022-08-17 NOTE — PROCEDURES
Small Joint Aspiration/Injection: R thumb CMC    Date/Time: 8/17/2022 9:30 AM  Performed by: Juan Naranjo MD  Authorized by: Juan Naranjo MD     Consent Done?:  Yes (Verbal)  Indications:  Arthritis and pain  Site marked: the procedure site was marked    Timeout: prior to procedure the correct patient, procedure, and site was verified    Prep: patient was prepped and draped in usual sterile fashion      Local anesthesia used?: No    Location:  Thumb  Site:  R thumb CMC  Ultrasonic guidance for needle placement?: No    Needle size:  25 G  Medications:  1 mL LIDOcaine HCL 10 mg/ml (1%) 10 mg/mL (1 %); 40 mg triamcinolone acetonide 40 mg/mL  Patient tolerance:  Patient tolerated the procedure well with no immediate complications

## 2022-08-17 NOTE — TELEPHONE ENCOUNTER
----- Message from Rosa Loza sent at 8/17/2022  1:09 PM CDT -----  Type: Patient Call Back    Who called: self     What is the request in detail: returning call    Can the clinic reply by [facility]?    Would the patient rather a call back or a response via [facility]?     Best call back number: [phone] (home)

## 2022-08-18 NOTE — TELEPHONE ENCOUNTER
----- Message from Alicia Sadler sent at 8/18/2022  4:29 PM CDT -----  Regarding: self [phone]  Type:  Patient Returning Call    Who Called:  self    Who Left Message for Patient: Christina    Does the patient know what this is regarding?: appt    Would the patient rather a call back or a response via [facility]?  Call back    Best Call Back Number: [phone]      Additional Information:  I could not liang 8 week follow up, no appt in epic till Nov

## 2022-08-19 ENCOUNTER — PATIENT MESSAGE (OUTPATIENT)
Dept: RHEUMATOLOGY | Facility: CLINIC | Age: 72
End: 2022-08-19
Payer: MEDICARE

## 2022-08-19 DIAGNOSIS — M1A.9XX0 CHRONIC GOUT WITHOUT TOPHUS, UNSPECIFIED CAUSE, UNSPECIFIED SITE: Primary | ICD-10-CM

## 2022-08-19 RX ORDER — COLCHICINE 0.6 MG/1
1 CAPSULE ORAL DAILY
Qty: 30 CAPSULE | Refills: 11 | Status: SHIPPED | OUTPATIENT
Start: 2022-08-19 | End: 2023-02-28 | Stop reason: SDUPTHER

## 2022-10-10 ENCOUNTER — PATIENT MESSAGE (OUTPATIENT)
Dept: PRIMARY CARE CLINIC | Facility: CLINIC | Age: 72
End: 2022-10-10
Payer: MEDICARE

## 2022-10-11 ENCOUNTER — APPOINTMENT (OUTPATIENT)
Dept: RADIOLOGY | Facility: OTHER | Age: 72
End: 2022-10-11
Attending: INTERNAL MEDICINE
Payer: MEDICARE

## 2022-10-11 DIAGNOSIS — M1A.9XX0 CHRONIC GOUT WITHOUT TOPHUS, UNSPECIFIED CAUSE, UNSPECIFIED SITE: ICD-10-CM

## 2022-10-11 PROCEDURE — 73120 XR HAND 2 VIEW BILAT: ICD-10-PCS | Mod: 26,50,, | Performed by: RADIOLOGY

## 2022-10-11 PROCEDURE — 73120 X-RAY EXAM OF HAND: CPT | Mod: 26,50,, | Performed by: RADIOLOGY

## 2022-10-11 PROCEDURE — 73120 X-RAY EXAM OF HAND: CPT | Mod: TC,50

## 2022-10-13 ENCOUNTER — PATIENT MESSAGE (OUTPATIENT)
Dept: RHEUMATOLOGY | Facility: CLINIC | Age: 72
End: 2022-10-13
Payer: MEDICARE

## 2022-10-21 ENCOUNTER — TELEPHONE (OUTPATIENT)
Dept: RHEUMATOLOGY | Facility: CLINIC | Age: 72
End: 2022-10-21
Payer: MEDICARE

## 2022-10-21 NOTE — TELEPHONE ENCOUNTER
----- Message from Lucrecia Clayton sent at 10/21/2022  6:22 AM CDT -----  Contact: Pt sent message via my [facility]  Appointment Request From: Jessica Floyd    With Provider: Juan Naranjo MD [[place] - Rheumatology]    Preferred Date Range: Any    Preferred Times: Any Time    Reason for visit: Office Visit    Comments:  Severe pain in right wrist

## 2022-10-31 ENCOUNTER — APPOINTMENT (OUTPATIENT)
Dept: RADIOLOGY | Facility: OTHER | Age: 72
End: 2022-10-31
Attending: PHYSICIAN ASSISTANT
Payer: MEDICARE

## 2022-10-31 ENCOUNTER — OFFICE VISIT (OUTPATIENT)
Dept: SPORTS MEDICINE | Facility: CLINIC | Age: 72
End: 2022-10-31
Payer: MEDICARE

## 2022-10-31 VITALS — HEIGHT: 64 IN | BODY MASS INDEX: 23.9 KG/M2 | WEIGHT: 140 LBS

## 2022-10-31 DIAGNOSIS — M25.562 CHRONIC PAIN OF LEFT KNEE: Primary | ICD-10-CM

## 2022-10-31 DIAGNOSIS — G89.29 CHRONIC PAIN OF LEFT KNEE: Primary | ICD-10-CM

## 2022-10-31 DIAGNOSIS — M25.362 PATELLAR INSTABILITY OF LEFT KNEE: ICD-10-CM

## 2022-10-31 DIAGNOSIS — G89.29 CHRONIC PAIN OF LEFT KNEE: ICD-10-CM

## 2022-10-31 DIAGNOSIS — M17.12 PRIMARY OSTEOARTHRITIS OF LEFT KNEE: ICD-10-CM

## 2022-10-31 DIAGNOSIS — M22.42 CHONDROMALACIA, PATELLA, LEFT: ICD-10-CM

## 2022-10-31 DIAGNOSIS — M25.562 CHRONIC PAIN OF LEFT KNEE: ICD-10-CM

## 2022-10-31 PROCEDURE — 73562 X-RAY EXAM OF KNEE 3: CPT | Mod: 26,RT,, | Performed by: RADIOLOGY

## 2022-10-31 PROCEDURE — 73562 XR KNEE ORTHO LEFT WITH FLEXION: ICD-10-PCS | Mod: 26,RT,, | Performed by: RADIOLOGY

## 2022-10-31 PROCEDURE — 73564 X-RAY EXAM KNEE 4 OR MORE: CPT | Mod: 26,LT,, | Performed by: RADIOLOGY

## 2022-10-31 PROCEDURE — 20610 LARGE JOINT ASPIRATION/INJECTION: L KNEE: ICD-10-PCS | Mod: LT,S$GLB,, | Performed by: PHYSICIAN ASSISTANT

## 2022-10-31 PROCEDURE — 3044F HG A1C LEVEL LT 7.0%: CPT | Mod: CPTII,S$GLB,, | Performed by: PHYSICIAN ASSISTANT

## 2022-10-31 PROCEDURE — 1159F MED LIST DOCD IN RCRD: CPT | Mod: CPTII,S$GLB,, | Performed by: PHYSICIAN ASSISTANT

## 2022-10-31 PROCEDURE — 99999 PR PBB SHADOW E&M-EST. PATIENT-LVL III: CPT | Mod: PBBFAC,,, | Performed by: PHYSICIAN ASSISTANT

## 2022-10-31 PROCEDURE — 3288F PR FALLS RISK ASSESSMENT DOCUMENTED: ICD-10-PCS | Mod: CPTII,S$GLB,, | Performed by: PHYSICIAN ASSISTANT

## 2022-10-31 PROCEDURE — 1160F PR REVIEW ALL MEDS BY PRESCRIBER/CLIN PHARMACIST DOCUMENTED: ICD-10-PCS | Mod: CPTII,S$GLB,, | Performed by: PHYSICIAN ASSISTANT

## 2022-10-31 PROCEDURE — 20610 DRAIN/INJ JOINT/BURSA W/O US: CPT | Mod: LT,S$GLB,, | Performed by: PHYSICIAN ASSISTANT

## 2022-10-31 PROCEDURE — 3288F FALL RISK ASSESSMENT DOCD: CPT | Mod: CPTII,S$GLB,, | Performed by: PHYSICIAN ASSISTANT

## 2022-10-31 PROCEDURE — 1159F PR MEDICATION LIST DOCUMENTED IN MEDICAL RECORD: ICD-10-PCS | Mod: CPTII,S$GLB,, | Performed by: PHYSICIAN ASSISTANT

## 2022-10-31 PROCEDURE — 3044F PR MOST RECENT HEMOGLOBIN A1C LEVEL <7.0%: ICD-10-PCS | Mod: CPTII,S$GLB,, | Performed by: PHYSICIAN ASSISTANT

## 2022-10-31 PROCEDURE — 1125F AMNT PAIN NOTED PAIN PRSNT: CPT | Mod: CPTII,S$GLB,, | Performed by: PHYSICIAN ASSISTANT

## 2022-10-31 PROCEDURE — 99999 PR PBB SHADOW E&M-EST. PATIENT-LVL III: ICD-10-PCS | Mod: PBBFAC,,, | Performed by: PHYSICIAN ASSISTANT

## 2022-10-31 PROCEDURE — 1101F PR PT FALLS ASSESS DOC 0-1 FALLS W/OUT INJ PAST YR: ICD-10-PCS | Mod: CPTII,S$GLB,, | Performed by: PHYSICIAN ASSISTANT

## 2022-10-31 PROCEDURE — 73564 XR KNEE ORTHO LEFT WITH FLEXION: ICD-10-PCS | Mod: 26,LT,, | Performed by: RADIOLOGY

## 2022-10-31 PROCEDURE — 1101F PT FALLS ASSESS-DOCD LE1/YR: CPT | Mod: CPTII,S$GLB,, | Performed by: PHYSICIAN ASSISTANT

## 2022-10-31 PROCEDURE — 99204 PR OFFICE/OUTPT VISIT, NEW, LEVL IV, 45-59 MIN: ICD-10-PCS | Mod: 25,S$GLB,, | Performed by: PHYSICIAN ASSISTANT

## 2022-10-31 PROCEDURE — 1125F PR PAIN SEVERITY QUANTIFIED, PAIN PRESENT: ICD-10-PCS | Mod: CPTII,S$GLB,, | Performed by: PHYSICIAN ASSISTANT

## 2022-10-31 PROCEDURE — 73562 X-RAY EXAM OF KNEE 3: CPT | Mod: 59,TC,RT

## 2022-10-31 PROCEDURE — 99204 OFFICE O/P NEW MOD 45 MIN: CPT | Mod: 25,S$GLB,, | Performed by: PHYSICIAN ASSISTANT

## 2022-10-31 PROCEDURE — 1160F RVW MEDS BY RX/DR IN RCRD: CPT | Mod: CPTII,S$GLB,, | Performed by: PHYSICIAN ASSISTANT

## 2022-10-31 RX ORDER — TRIAMCINOLONE ACETONIDE 40 MG/ML
40 INJECTION, SUSPENSION INTRA-ARTICULAR; INTRAMUSCULAR
Status: DISCONTINUED | OUTPATIENT
Start: 2022-10-31 | End: 2022-10-31 | Stop reason: HOSPADM

## 2022-10-31 RX ORDER — BUPIVACAINE HYDROCHLORIDE 2.5 MG/ML
2 INJECTION, SOLUTION INFILTRATION; PERINEURAL
Status: DISCONTINUED | OUTPATIENT
Start: 2022-10-31 | End: 2022-10-31 | Stop reason: HOSPADM

## 2022-10-31 RX ADMIN — TRIAMCINOLONE ACETONIDE 40 MG: 40 INJECTION, SUSPENSION INTRA-ARTICULAR; INTRAMUSCULAR at 08:10

## 2022-10-31 RX ADMIN — BUPIVACAINE HYDROCHLORIDE 2 ML: 2.5 INJECTION, SOLUTION INFILTRATION; PERINEURAL at 08:10

## 2022-10-31 NOTE — PROGRESS NOTES
NEW PATIENT    HISTORY OF PRESENT ILLNESS   72 y.o. Female with a history of left knee pain who reports having chronic patellar instability with 3 dislocations requiring manual reduction beginning when she was 11 years old.  She has had 2 arthroscopic procedures which were clean out procedures and not reconstructive.  She was previously seen another provider and given that Euflexxa injections approximately 4 years ago.  She states these injections give her temporary relief.  She recently completed an extended course of physical therapy which also gave her relief.  Her biggest complaint today is anterior knee pain when sitting for any length of time.  She denies having any mechanical catching or popping sensations.  She has had no recent subluxation or instability events.        - mechanical symptoms, - instability    Is affecting ADLs.  Pain is 10/10 at it's worst.        PAST MEDICAL HISTORY    Past Medical History:   Diagnosis Date    Allergic rhinitis     Carotid stenosis     Coronary atherosclerosis     Outside [facility] 2014: 20% mLAD, 50% mCx, 20%, mRCA    Dyslipidemia     ESBL (extended spectrum beta-lactamase) producing bacteria infection     UTI    Hypertension     Nausea and vomiting 2017    Subarachnoid hemorrhage due to ruptured aneurysm        PAST SURGICAL HISTORY     Past Surgical History:   Procedure Laterality Date    ADENOIDECTOMY      ANTERIOR CRUCIATE LIGAMENT REPAIR      APPENDECTOMY      CEREBRAL ANEURYSM REPAIR      clip     SECTION      DENTAL SURGERY      TONSILLECTOMY         FAMILY HISTORY    Family History   Problem Relation Age of Onset    Hypertension Mother     Aneurysm Mother     Hypertension Father     Alcohol abuse Father     Kidney disease Brother     Heart disease Brother     Gout Brother     No Known Problems Daughter     No Known Problems Daughter     No Known Problems Daughter        SOCIAL HISTORY    Social History     Socioeconomic History    Marital  status:    Occupational History    Occupation: Retired   Tobacco Use    Smoking status: Former     Packs/day: 0.50     Years: 20.00     Pack years: 10.00     Types: Cigarettes     Quit date: 1999     Years since quittin.8    Smokeless tobacco: Never   Substance and Sexual Activity    Alcohol use: Yes     Alcohol/week: 7.0 standard drinks     Types: 7 Glasses of wine per week    Drug use: No    Sexual activity: Yes     Partners: Male   Social History Narrative    .  Has 3 grown daughters.   lives in [facility].       Social Determinants of Health     Financial Resource Strain: Low Risk     Difficulty of Paying Living Expenses: Not hard at all   Food Insecurity: No Food Insecurity    Worried About Running Out of Food in the Last Year: Never true    Ran Out of Food in the Last Year: Never true   Transportation Needs: Unmet Transportation Needs    Lack of Transportation (Medical): Yes    Lack of Transportation (Non-Medical): Yes   Physical Activity: Inactive    Days of Exercise per Week: 0 days    Minutes of Exercise per Session: 0 min   Stress: Stress Concern Present    Feeling of Stress : To some extent   Social Connections: Moderately Integrated    Frequency of Communication with Friends and Family: More than three times a week    Frequency of Social Gatherings with Friends and Family: More than three times a week    Attends Yarsanism Services: More than 4 times per year    Active Member of Clubs or Organizations: No    Attends Club or Organization Meetings: Never    Marital Status:    Housing Stability: Unknown    Unable to Pay for Housing in the Last Year: No    Unstable Housing in the Last Year: No       MEDICATIONS      Current Outpatient Medications:     allopurinoL (ZYLOPRIM) 100 MG tablet, Take 0.5 tablets (50 mg total) by mouth once daily., Disp: 30 tablet, Rfl: 11    amLODIPine (NORVASC) 10 MG tablet, TAKE 1 TABLET BY MOUTH EVERY DAY, Disp: 90 tablet, Rfl: 3    amoxicillin  (AMOXIL) 500 MG capsule, TAKE 2 CAPSULES BY MOUTH NOW, THEN ONE CAPSULE THREE TIMES A DAY UNTIL COMPLETELY FINISHED, Disp: , Rfl:     aspirin (ECOTRIN) 81 MG EC tablet, Take 1 tablet (81 mg total) by mouth once daily., Disp: , Rfl: 0    atorvastatin (LIPITOR) 80 MG tablet, TAKE 1 TABLET BY MOUTH EVERY DAY, Disp: 90 tablet, Rfl: 3    carvediloL (COREG) 12.5 MG tablet, TAKE 1 TABLET BY MOUTH TWICE A DAY WITH MEALS, Disp: 180 tablet, Rfl: 3    colchicine (MITIGARE) 0.6 mg Cap, Take 1 capsule (0.6 mg total) by mouth once daily at 6am., Disp: 30 capsule, Rfl: 11    diazePAM (VALIUM) 10 MG Tab, TAKE 1 TABLET BY MOUTH 1 HOUR PRIOR TO APPT, Disp: , Rfl:     fluticasone propionate (FLONASE) 50 mcg/actuation nasal spray, SPRAY ONCE INTO EACH NOSTRIL ONCE DAILY, Disp: 16 mL, Rfl: 3    hydroCHLOROthiazide (HYDRODIURIL) 25 MG tablet, TAKE 1 TABLET BY MOUTH EVERY DAY, Disp: 90 tablet, Rfl: 3    HYDROcodone-acetaminophen (NORCO) 5-325 mg per tablet, Take 1 tablet by mouth every 4 to 6 hours as needed., Disp: , Rfl:     hydrocortisone 2.5 % ointment, as needed., Disp: , Rfl:     ketoconazole (NIZORAL) 2 % cream, APPLY BETWEEN TOES TWICE DAILY X 2 WKS, Disp: , Rfl:     methylPREDNISolone (MEDROL DOSEPACK) 4 mg tablet, use as directed, Disp: 1 each, Rfl: 0    montelukast (SINGULAIR) 10 mg tablet, Take 1 tablet (10 mg total) by mouth every evening., Disp: 90 tablet, Rfl: 1    pantoprazole (PROTONIX) 40 MG tablet, Take 1 tablet (40 mg total) by mouth once daily., Disp: 90 tablet, Rfl: 3    promethazine (PHENERGAN) 25 MG tablet, TAKE 1 TABLET BY MOUTH EVERY 4 TO 6 HOURS AS NEEDED FOR NAUSEA AND VOMITING, Disp: , Rfl:     ALLERGIES     Review of patient's allergies indicates:  No Known Allergies      REVIEW OF SYSTEMS   Constitution: Negative. Negative for chills, fever and night sweats.   HENT: Negative for congestion and headaches.    Eyes: Negative for blurred vision, left vision loss and right vision loss.   Cardiovascular: Negative  "for chest pain and syncope.   Respiratory: Negative for cough and shortness of breath.    Endocrine: Negative for polydipsia, polyphagia and polyuria.   Hematologic/Lymphatic: Negative for bleeding problem. Does not bruise/bleed easily.   Skin: Negative for dry skin, itching and rash.   Musculoskeletal: Negative for falls. Positive for left knee pain.  Gastrointestinal: Negative for abdominal pain and bowel incontinence.   Genitourinary: Negative for bladder incontinence and nocturia.   Neurological: Negative for disturbances in coordination, loss of balance and seizures.   Psychiatric/Behavioral: Negative for depression. The patient does not have insomnia.    Allergic/Immunologic: Negative for hives and persistent infections.     PHYSICAL EXAMINATION    Vitals: Ht 5' 4" (1.626 m)   Wt 63.5 kg (140 lb)   BMI 24.03 kg/m²     General: The patient appears active and healthy with no apparent physical problems.  No disturbance of mood or affect is demonstrated. Alert and Oriented.    HEENT: Eyes normal, pupils equally round, nose normal.    Resp: Equal and symmetrical chest rises. No wheezing    CV: Regular rate    Neck: Supple; nonpainful range of motion.    Extremities: no cyanosis, clubbing, edema, or diffuse swelling.  Palpable pulses, good capillary refill of the digits.  No coolness, discoloration, edema or obvious varicosities.    Skin: no lesions noted.    Lymphatic: no detected adenopathy in the upper or lower extremities.    Neurologic: normal mental status, normal reflexes, normal gait and balance.  Patient is alert and oriented to person, place and time.  No flaccidity or spasticity is noted.  No motor or sensory deficits are noted.  Light touch is intact    Orthopaedic: Knee Musculoskeletal Exam  Gait    Antalgic: left    Assistive device: cane    Inspection    Left      Left knee inspection is normal.       Erythema: none        Effusion: none        Edema: none        Ecchymosis: none        Deformity: " none        Alignment: normal        Previous incision: no previous incision    Palpation    Left      Increased warmth: none        Masses: none        Crepitus: patellofemoral        Tenderness: present          Lateral joint line: moderate          Lateral retinaculum: mild          LCL: mild          Medial joint line: moderate          Medial retinaculum: mild          Patella: mild          Patellar tendon: mild      Range of Motion    Left      Left knee range of motion is normal and full.      Strength    Left      Left knee strength is normal.       Extension: 5/5. Extension is not affected by pain.       Flexion: 5/5. Flexion is not affected by pain.      Instability    Left      Instability signs: none - stable      Varus stress grade: normal      Valgus stress grade: normal      Anterior drawer: normal      Posterior drawer: normal      Medial Carlton test: negative      Lateral Carlton test: negative      Lachman: negative    Neurovascular    Left      Left knee neurovascular exam is normal.        Pulses - DP: normal      Pulses - PT: normal    Special Signs    Left      Left knee special signs are normal.      Straight leg raise: normal      J sign: none        Patellar compression: moderate        Patellar apprehension: moderate        Patellar glide medial: 1      Patellar glide lateral: 1      IMAGING    X-ray Knee Ortho Left with Flexion  Narrative: EXAMINATION:  XR KNEE ORTHO LEFT WITH FLEXION    CLINICAL HISTORY:  . Pain in left knee    TECHNIQUE:  AP standing view of both knees, PA flexion standing views of both knees, and Merchant views of both knees were performed. A lateral view of the left knee was also performed.    COMPARISON:  February 15, 2019    FINDINGS:  Left knee:    No fracture.  As before, no significant narrowing of the medial or lateral tibiofemoral or patellofemoral articulations and no significant periarticular spurring.  No suprapatellar bursal effusion or prepatellar soft  tissue swelling.  Stable appearance of bony mineralization involving visualized distal left femur.    Right knee:    No fracture.  No significant narrowing of medial or lateral tibiofemoral or patellofemoral articulations and no significant periarticular spurring.  No prepatellar soft tissue swelling.  Stable appearance of bony mineralization involving distal right femur.  Impression: No significant interval changes    Electronically signed by: Manny Bermudez  Date:    10/31/2022  Time:    09:20    X-rays including four views of the left knee or completed today. crutches and report were reviewed by me personally.  There are no acute findings.  No fracture or dislocation.  No significant joint space narrowing or periarticular osteophyte formation.  There is a small area of calcification adjacent to the lateral femoral condyle.    IMPRESSION     72-year-old female with chronic left knee pain following patellar dislocation x 3.  She has had no recent instability events.  Her history and physical examination are consistent with patellofemoral osteoarthritis.  She has done well with injections in the past and elected to proceed with an intra-articular corticosteroid injection today. She would also like to repeat the Euflexxa series once authorization has been given.      ICD-10-CM ICD-9-CM   1. Chronic pain of left knee  M25.562 719.46    G89.29 338.29   2. Chondromalacia, patella, left  M22.42 717.7   3. Patellar instability of left knee  M25.362 718.86   4. Primary osteoarthritis of left knee  M17.12 715.16       MEDICATIONS PRESCRIBED      None    RECOMMENDATIONS     Intra-articular corticosteroid injection was administered into the pain left knee today  RTC for Euflexxa injections      Large Joint Aspiration/Injection: L knee    Date/Time: 10/31/2022 8:30 AM  Performed by: Armani Cai PA-C  Authorized by: Armani Cai PA-C     Consent Done?:  Yes (Verbal)  Indications:  Pain  Site marked: the procedure site  was marked    Timeout: prior to procedure the correct patient, procedure, and site was verified    Prep: patient was prepped and draped in usual sterile fashion      Local anesthesia used?: Yes    Local anesthetic:  Co-phenylcaine spray    Details:  Needle Size:  22 G  Ultrasonic Guidance for needle placement?: No    Approach:  Anterolateral  Location:  Knee  Site:  L knee  Medications:  40 mg triamcinolone acetonide 40 mg/mL; 2 mL BUPivacaine 0.25% (2.5 mg/ml) 0.25 % (2.5 mg/mL)  Patient tolerance:  Patient tolerated the procedure well with no immediate complications      All questions were answered, pt will contact us for questions or concerns in the interim.

## 2022-11-01 ENCOUNTER — OFFICE VISIT (OUTPATIENT)
Dept: OPHTHALMOLOGY | Facility: CLINIC | Age: 72
End: 2022-11-01
Attending: OPHTHALMOLOGY
Payer: MEDICARE

## 2022-11-01 ENCOUNTER — TELEPHONE (OUTPATIENT)
Dept: OPHTHALMOLOGY | Facility: CLINIC | Age: 72
End: 2022-11-01

## 2022-11-01 DIAGNOSIS — H25.11 NUCLEAR SCLEROTIC CATARACT OF RIGHT EYE: Primary | ICD-10-CM

## 2022-11-01 DIAGNOSIS — H25.13 NUCLEAR SCLEROSIS, BILATERAL: Primary | ICD-10-CM

## 2022-11-01 DIAGNOSIS — H53.002 AMBLYOPIA OF LEFT EYE: ICD-10-CM

## 2022-11-01 DIAGNOSIS — H25.12 NUCLEAR SCLEROTIC CATARACT OF LEFT EYE: Primary | ICD-10-CM

## 2022-11-01 PROCEDURE — 1159F MED LIST DOCD IN RCRD: CPT | Mod: CPTII,S$GLB,, | Performed by: OPHTHALMOLOGY

## 2022-11-01 PROCEDURE — 99204 PR OFFICE/OUTPT VISIT, NEW, LEVL IV, 45-59 MIN: ICD-10-PCS | Mod: S$GLB,,, | Performed by: OPHTHALMOLOGY

## 2022-11-01 PROCEDURE — 99204 OFFICE O/P NEW MOD 45 MIN: CPT | Mod: S$GLB,,, | Performed by: OPHTHALMOLOGY

## 2022-11-01 PROCEDURE — 1101F PT FALLS ASSESS-DOCD LE1/YR: CPT | Mod: CPTII,S$GLB,, | Performed by: OPHTHALMOLOGY

## 2022-11-01 PROCEDURE — 1101F PR PT FALLS ASSESS DOC 0-1 FALLS W/OUT INJ PAST YR: ICD-10-PCS | Mod: CPTII,S$GLB,, | Performed by: OPHTHALMOLOGY

## 2022-11-01 PROCEDURE — 1160F PR REVIEW ALL MEDS BY PRESCRIBER/CLIN PHARMACIST DOCUMENTED: ICD-10-PCS | Mod: CPTII,S$GLB,, | Performed by: OPHTHALMOLOGY

## 2022-11-01 PROCEDURE — 3288F FALL RISK ASSESSMENT DOCD: CPT | Mod: CPTII,S$GLB,, | Performed by: OPHTHALMOLOGY

## 2022-11-01 PROCEDURE — 1159F PR MEDICATION LIST DOCUMENTED IN MEDICAL RECORD: ICD-10-PCS | Mod: CPTII,S$GLB,, | Performed by: OPHTHALMOLOGY

## 2022-11-01 PROCEDURE — 92136 OPHTHALMIC BIOMETRY: CPT | Mod: RT,S$GLB,, | Performed by: OPHTHALMOLOGY

## 2022-11-01 PROCEDURE — 92136 IOL MASTER - OU - BOTH EYES: ICD-10-PCS | Mod: RT,S$GLB,, | Performed by: OPHTHALMOLOGY

## 2022-11-01 PROCEDURE — 1126F PR PAIN SEVERITY QUANTIFIED, NO PAIN PRESENT: ICD-10-PCS | Mod: CPTII,S$GLB,, | Performed by: OPHTHALMOLOGY

## 2022-11-01 PROCEDURE — 99999 PR PBB SHADOW E&M-EST. PATIENT-LVL III: ICD-10-PCS | Mod: PBBFAC,,, | Performed by: OPHTHALMOLOGY

## 2022-11-01 PROCEDURE — 1126F AMNT PAIN NOTED NONE PRSNT: CPT | Mod: CPTII,S$GLB,, | Performed by: OPHTHALMOLOGY

## 2022-11-01 PROCEDURE — 3044F HG A1C LEVEL LT 7.0%: CPT | Mod: CPTII,S$GLB,, | Performed by: OPHTHALMOLOGY

## 2022-11-01 PROCEDURE — 99999 PR PBB SHADOW E&M-EST. PATIENT-LVL III: CPT | Mod: PBBFAC,,, | Performed by: OPHTHALMOLOGY

## 2022-11-01 PROCEDURE — 3288F PR FALLS RISK ASSESSMENT DOCUMENTED: ICD-10-PCS | Mod: CPTII,S$GLB,, | Performed by: OPHTHALMOLOGY

## 2022-11-01 PROCEDURE — 3044F PR MOST RECENT HEMOGLOBIN A1C LEVEL <7.0%: ICD-10-PCS | Mod: CPTII,S$GLB,, | Performed by: OPHTHALMOLOGY

## 2022-11-01 PROCEDURE — 1160F RVW MEDS BY RX/DR IN RCRD: CPT | Mod: CPTII,S$GLB,, | Performed by: OPHTHALMOLOGY

## 2022-11-01 RX ORDER — PREDNISOLONE ACETATE-GATIFLOXACIN-BROMFENAC .75; 5; 1 MG/ML; MG/ML; MG/ML
1 SUSPENSION/ DROPS OPHTHALMIC 3 TIMES DAILY
Qty: 5 ML | Refills: 3 | Status: SHIPPED | OUTPATIENT
Start: 2022-11-01 | End: 2023-01-25

## 2022-11-01 RX ORDER — MOXIFLOXACIN 5 MG/ML
1 SOLUTION/ DROPS OPHTHALMIC
Status: CANCELLED | OUTPATIENT
Start: 2022-11-01

## 2022-11-01 RX ORDER — TROPICAMIDE 10 MG/ML
1 SOLUTION/ DROPS OPHTHALMIC
Status: CANCELLED | OUTPATIENT
Start: 2022-11-01

## 2022-11-01 RX ORDER — TETRACAINE HYDROCHLORIDE 5 MG/ML
1 SOLUTION OPHTHALMIC
Status: CANCELLED | OUTPATIENT
Start: 2022-11-01

## 2022-11-01 RX ORDER — PHENYLEPHRINE HYDROCHLORIDE 25 MG/ML
1 SOLUTION/ DROPS OPHTHALMIC
Status: CANCELLED | OUTPATIENT
Start: 2022-11-01

## 2022-11-01 RX ORDER — PHENYLEPHRINE HYDROCHLORIDE 100 MG/ML
1 SOLUTION/ DROPS OPHTHALMIC
Status: CANCELLED | OUTPATIENT
Start: 2022-11-01

## 2022-11-01 NOTE — H&P (VIEW-ONLY)
HPI    Referred by [facility] on [address]    Patient states she was told was time for her cataract sx. Patient states   she an avid reader and is now staining to read. She states she is now   panicking because it is her one good eye because her vision is blurry. She   has intermittent floaters. Patient states  she walks with a cane and now   with her one good eye sometimes finds herself tripping over things.     No gtts.       Amblyopia OS since childhood  Cerebral aneurysm 12/1999  S/p Brain sx 12/1999  Minium Peripheral Vision OS  Last edited by Sue Krueger on 11/1/2022  2:46 PM.            Assessment /Plan     For exam results, see Encounter Report.    Nuclear sclerosis, bilateral    Amblyopia of left eye      Visually Significant Cataract: Patient reports decreased vision consistent with the clinical amount of lenticular opacity, which reaches the level of visual significance and affects activities of daily living.     Specifically, this patient describes difficulty with:  - driving safely at night  - reading road signs  - reading small print  - deciphering medicine bottles  - reading the newspaper  - using the phone  - reading texts     Risks, benefits, and alternatives to cataract surgery were discussed and the consent reviewed. IOL options were discussed, including ATIOLs and the associated side effects and additional patient cost associated with them.   IOL Selections:   Right eye  IOL: diboo 23.5     Left eye  IOL: diboo 23.0 (Amblyopia)    Pt wishes to have RIGHT eye done first.  Amblyopia OS, functionally monocular

## 2022-11-01 NOTE — PROGRESS NOTES
HPI    Referred by [facility] on [address]    Patient states she was told was time for her cataract sx. Patient states   she an avid reader and is now staining to read. She states she is now   panicking because it is her one good eye because her vision is blurry. She   has intermittent floaters. Patient states  she walks with a cane and now   with her one good eye sometimes finds herself tripping over things.     No gtts.       Amblyopia OS since childhood  Cerebral aneurysm 12/1999  S/p Brain sx 12/1999  Minium Peripheral Vision OS  Last edited by Sue Krueger on 11/1/2022  2:46 PM.            Assessment /Plan     For exam results, see Encounter Report.    Nuclear sclerosis, bilateral    Amblyopia of left eye      Visually Significant Cataract: Patient reports decreased vision consistent with the clinical amount of lenticular opacity, which reaches the level of visual significance and affects activities of daily living.     Specifically, this patient describes difficulty with:  - driving safely at night  - reading road signs  - reading small print  - deciphering medicine bottles  - reading the newspaper  - using the phone  - reading texts     Risks, benefits, and alternatives to cataract surgery were discussed and the consent reviewed. IOL options were discussed, including ATIOLs and the associated side effects and additional patient cost associated with them.   IOL Selections:   Right eye  IOL: diboo 23.5     Left eye  IOL: diboo 23.0 (Amblyopia)    Pt wishes to have RIGHT eye done first.  Amblyopia OS, functionally monocular

## 2022-11-02 DIAGNOSIS — M17.12 PRIMARY OSTEOARTHRITIS OF LEFT KNEE: Primary | ICD-10-CM

## 2022-11-03 ENCOUNTER — TELEPHONE (OUTPATIENT)
Dept: SPORTS MEDICINE | Facility: CLINIC | Age: 72
End: 2022-11-03
Payer: MEDICARE

## 2022-11-03 NOTE — PROGRESS NOTES
CHIEF COMPLAINT: Needs an Adacel immunization    HISTORY OF PRESENT ILLNESS: The patient is a 67 year-old white female.  Her daughter is due to deliver in a couple of days.  The baby girl will be premature at 34 weeks.  She is naturally concerned about this.  She needs an Adacel immunization.    The patient has no specific complaints today.  She describes herself as a very proactive patient.  She likes to be involved in all aspects of her health care.  She has even gone so far as to attend medical school classes as an .      She has a medical history that is complicated.  She had a subarachnoid hemorrhage and aneurysm clipping.      She has known coronary artery disease     The patient has a history of stable hypertension on current medications.  Patient denies chest pain or shortness of breath today.    REVIEW OF SYSTEMS:  GENERAL: No fever, chills, fatigability or weight loss.  SKIN: No rashes, itching or changes in color or texture of skin.  HEAD: No headaches or recent head trauma.  EYES: Visual acuity fine. No photophobia, ocular pain or diplopia.  EARS: Denies ear pain, discharge or vertigo.  NOSE: No loss of smell, no epistaxis or postnasal drip.  MOUTH & THROAT: No hoarseness or change in voice. No excessive gum bleeding.  NODES: Denies swollen glands.  CHEST: Denies SAMPSON, cyanosis, wheezing, cough and sputum production.  CARDIOVASCULAR: Denies chest pain, PND, orthopnea or reduced exercise tolerance.  ABDOMEN: Appetite fine. No weight loss. Denies diarrhea, abdominal pain, hematemesis or blood in stool.  URINARY: No flank pain, dysuria or hematuria.  PERIPHERAL VASCULAR: No claudication or cyanosis.  MUSCULOSKELETAL: No joint stiffness or swelling. Denies back pain.  NEUROLOGIC: No history of seizures, paralysis, alteration of gait or coordination.    SOCIAL HISTORY: The patient does not smoke.  The patient consumes alcohol socially.    PHYSICAL EXAMINATION:   Blood pressure 120/80, pulse 86, height  "5' 4" (1.626 m), weight 73.6 kg (162 lb 4.1 oz).    APPEARANCE: Well nourished, well developed, in no acute distress.    HEAD: Normocephalic, atraumatic.  EYES: PERRL. EOMI.  Conjunctivae without injection and  anicteric  EARS: TM's intact. Light reflex normal. No retraction or perforation.    NOSE: Mucosa pink. Airway clear.  MOUTH & THROAT: No tonsillar enlargement. No pharyngeal erythema or exudate. No stridor.  NECK: Supple.   NODES: No cervical, axillary or inguinal lymph node enlargement.  ABDOMEN: Bowel sounds normal. Not distended. Soft. No tenderness or masses.  No ascites is noted.  MUSCULOSKELETAL:  There is no clubbing, cyanosis, or edema of the extremities x4.  There is full range of motion of the lumbar spine.  There is full range of motion of the extremities x4.  There is no deformity noted.    NEUROLOGIC:       Normal speech development.      Hearing normal.      Normal gait.      Motor and sensory exams grossly normal.      DTR's normal.  PSYCHIATRIC: Patient is alert and oriented x3.  Thought processes are all normal.  There is no homicidality.  There is no suicidality.  There is no evidence of psychosis.    LABORATORY/RADIOLOGY:   Chart reviewed.  We will update blood work today.    ASSESSMENT:   Need Adacel immunization  Hypertension  Hyperlipidemia  Subarachnoid hemorrhage due to aneurysm status post clipping    PLAN:  Adacel given today  She looks good otherwise.  I wish her the best of luck on her granddaughter next week.  Continue medications otherwise  Follow-up with PCP    " no

## 2022-11-03 NOTE — TELEPHONE ENCOUNTER
----- Message from Lisa Urrutia sent at 11/3/2022  3:22 PM CDT -----  Contact: pt  Pt calling to see if she can get her appt for her injection to  be for 8am . Please call     Confirmed patient's contact info below:  Contact Name: Jessica Floyd  Phone Number: [phone]

## 2022-11-04 ENCOUNTER — PATIENT MESSAGE (OUTPATIENT)
Dept: OPHTHALMOLOGY | Facility: CLINIC | Age: 72
End: 2022-11-04
Payer: MEDICARE

## 2022-11-07 ENCOUNTER — ANESTHESIA EVENT (OUTPATIENT)
Dept: SURGERY | Facility: OTHER | Age: 72
End: 2022-11-07
Payer: MEDICARE

## 2022-11-07 ENCOUNTER — ANESTHESIA (OUTPATIENT)
Dept: SURGERY | Facility: OTHER | Age: 72
End: 2022-11-07
Payer: MEDICARE

## 2022-11-07 ENCOUNTER — HOSPITAL ENCOUNTER (OUTPATIENT)
Facility: OTHER | Age: 72
Discharge: HOME | End: 2022-11-07
Attending: OPHTHALMOLOGY | Admitting: OPHTHALMOLOGY
Payer: MEDICARE

## 2022-11-07 VITALS
RESPIRATION RATE: 16 BRPM | SYSTOLIC BLOOD PRESSURE: 132 MMHG | HEART RATE: 71 BPM | TEMPERATURE: 98 F | DIASTOLIC BLOOD PRESSURE: 68 MMHG | WEIGHT: 147 LBS | BODY MASS INDEX: 25.1 KG/M2 | HEIGHT: 64 IN | OXYGEN SATURATION: 96 %

## 2022-11-07 DIAGNOSIS — H25.11 NUCLEAR SCLEROTIC CATARACT OF RIGHT EYE: Primary | ICD-10-CM

## 2022-11-07 DIAGNOSIS — H25.13 NUCLEAR SCLEROSIS, BILATERAL: ICD-10-CM

## 2022-11-07 PROCEDURE — 66984 PR REMOVAL, CATARACT, W/INSRT INTRAOC LENS, W/O ENDO CYCLO: ICD-10-PCS | Mod: RT,,, | Performed by: OPHTHALMOLOGY

## 2022-11-07 PROCEDURE — 37000009 HC ANESTHESIA EA ADD 15 MINS: Performed by: OPHTHALMOLOGY

## 2022-11-07 PROCEDURE — 36000706: Performed by: OPHTHALMOLOGY

## 2022-11-07 PROCEDURE — 71000015 HC POSTOP RECOV 1ST HR: Performed by: OPHTHALMOLOGY

## 2022-11-07 PROCEDURE — 25000003 PHARM REV CODE 250: Performed by: OPHTHALMOLOGY

## 2022-11-07 PROCEDURE — 36000707: Performed by: OPHTHALMOLOGY

## 2022-11-07 PROCEDURE — 66984 XCAPSL CTRC RMVL W/O ECP: CPT | Mod: RT,,, | Performed by: OPHTHALMOLOGY

## 2022-11-07 PROCEDURE — V2632 POST CHMBR INTRAOCULAR LENS: HCPCS | Performed by: OPHTHALMOLOGY

## 2022-11-07 PROCEDURE — 37000008 HC ANESTHESIA 1ST 15 MINUTES: Performed by: OPHTHALMOLOGY

## 2022-11-07 PROCEDURE — 63600175 PHARM REV CODE 636 W HCPCS: Performed by: NURSE ANESTHETIST, CERTIFIED REGISTERED

## 2022-11-07 DEVICE — LENS EYHANCE +23.5D: Type: IMPLANTABLE DEVICE | Site: EYE | Status: FUNCTIONAL

## 2022-11-07 RX ORDER — MOXIFLOXACIN 5 MG/ML
SOLUTION/ DROPS OPHTHALMIC
Status: DISCONTINUED | OUTPATIENT
Start: 2022-11-07 | End: 2022-11-07 | Stop reason: HOSPADM

## 2022-11-07 RX ORDER — MOXIFLOXACIN 5 MG/ML
1 SOLUTION/ DROPS OPHTHALMIC
Status: COMPLETED | OUTPATIENT
Start: 2022-11-07 | End: 2022-11-07

## 2022-11-07 RX ORDER — TETRACAINE HYDROCHLORIDE 5 MG/ML
SOLUTION OPHTHALMIC
Status: DISCONTINUED | OUTPATIENT
Start: 2022-11-07 | End: 2022-11-07 | Stop reason: HOSPADM

## 2022-11-07 RX ORDER — PROPARACAINE HYDROCHLORIDE 5 MG/ML
1 SOLUTION/ DROPS OPHTHALMIC
Status: DISCONTINUED | OUTPATIENT
Start: 2022-11-07 | End: 2022-11-07 | Stop reason: HOSPADM

## 2022-11-07 RX ORDER — TROPICAMIDE 10 MG/ML
1 SOLUTION/ DROPS OPHTHALMIC
Status: COMPLETED | OUTPATIENT
Start: 2022-11-07 | End: 2022-11-07

## 2022-11-07 RX ORDER — PHENYLEPHRINE HYDROCHLORIDE 25 MG/ML
1 SOLUTION/ DROPS OPHTHALMIC
Status: COMPLETED | OUTPATIENT
Start: 2022-11-07 | End: 2022-11-07

## 2022-11-07 RX ORDER — ACETAMINOPHEN 325 MG/1
650 TABLET ORAL EVERY 4 HOURS PRN
Status: DISCONTINUED | OUTPATIENT
Start: 2022-11-07 | End: 2022-11-07 | Stop reason: HOSPADM

## 2022-11-07 RX ORDER — PHENYLEPHRINE HYDROCHLORIDE 100 MG/ML
1 SOLUTION/ DROPS OPHTHALMIC
Status: DISCONTINUED | OUTPATIENT
Start: 2022-11-07 | End: 2022-11-07 | Stop reason: HOSPADM

## 2022-11-07 RX ORDER — TETRACAINE HYDROCHLORIDE 5 MG/ML
1 SOLUTION OPHTHALMIC
Status: COMPLETED | OUTPATIENT
Start: 2022-11-07 | End: 2022-11-07

## 2022-11-07 RX ORDER — MIDAZOLAM HYDROCHLORIDE 1 MG/ML
INJECTION INTRAMUSCULAR; INTRAVENOUS
Status: DISCONTINUED | OUTPATIENT
Start: 2022-11-07 | End: 2022-11-07

## 2022-11-07 RX ORDER — FENTANYL CITRATE 50 UG/ML
INJECTION, SOLUTION INTRAMUSCULAR; INTRAVENOUS
Status: DISCONTINUED | OUTPATIENT
Start: 2022-11-07 | End: 2022-11-07

## 2022-11-07 RX ORDER — LIDOCAINE HYDROCHLORIDE 40 MG/ML
INJECTION, SOLUTION RETROBULBAR
Status: DISCONTINUED | OUTPATIENT
Start: 2022-11-07 | End: 2022-11-07 | Stop reason: HOSPADM

## 2022-11-07 RX ADMIN — TETRACAINE HYDROCHLORIDE 1 DROP: 5 SOLUTION OPHTHALMIC at 11:11

## 2022-11-07 RX ADMIN — PHENYLEPHRINE HYDROCHLORIDE 1 DROP: 25 SOLUTION/ DROPS OPHTHALMIC at 11:11

## 2022-11-07 RX ADMIN — MOXIFLOXACIN 1 DROP: 5 SOLUTION/ DROPS OPHTHALMIC at 11:11

## 2022-11-07 RX ADMIN — MOXIFLOXACIN 1 DROP: 5 SOLUTION/ DROPS OPHTHALMIC at 12:11

## 2022-11-07 RX ADMIN — MIDAZOLAM HYDROCHLORIDE 2 MG: 1 INJECTION, SOLUTION INTRAMUSCULAR; INTRAVENOUS at 12:11

## 2022-11-07 RX ADMIN — FENTANYL CITRATE 100 MCG: 50 INJECTION, SOLUTION INTRAMUSCULAR; INTRAVENOUS at 12:11

## 2022-11-07 RX ADMIN — TROPICAMIDE 1 DROP: 10 SOLUTION/ DROPS OPHTHALMIC at 11:11

## 2022-11-07 NOTE — ANESTHESIA POSTPROCEDURE EVALUATION
Anesthesia Post Evaluation    Patient: Jessica Floyd    Procedure(s) Performed: Procedure(s) (LRB):  EXTRACTION, CATARACT, WITH IOL INSERTION (Right)    Final Anesthesia Type: MAC      Patient location during evaluation: [facility]  Patient participation: Yes- Able to Participate  Level of consciousness: awake and alert  Post-procedure vital signs: reviewed and stable  Pain management: adequate  Airway patency: patent    PONV status at discharge: No PONV  Anesthetic complications: no      Cardiovascular status: blood pressure returned to baseline  Respiratory status: unassisted, room air and spontaneous ventilation  Hydration status: euvolemic  Follow-up not needed.          Vitals Value Taken Time   /79 11/07/22 1107   Temp 36.7 °C (98.1 °F) 11/07/22 1107   Pulse 74 11/07/22 1107   Resp 16 11/07/22 1107   SpO2 97 % 11/07/22 1107         No case tracking events are documented in the log.      Pain/Paris Score: No data recorded

## 2022-11-07 NOTE — ANESTHESIA PREPROCEDURE EVALUATION
11/07/2022  Jessica Floyd is a 72 y.o., female.      Pre-op Assessment    I have reviewed the Patient Summary Reports.     I have reviewed the Nursing Notes. I have reviewed the NPO Status.   I have reviewed the Medications.     Review of Systems  Anesthesia Hx:  Denies Family Hx of Anesthesia complications.   Denies Personal Hx of Anesthesia complications.   Social:  Former Smoker    Hematology/Oncology:  Hematology Normal   Oncology Normal     Cardiovascular:   Hypertension CAD   PVD hyperlipidemia    Pulmonary:  Pulmonary Normal    Renal/:   Chronic Renal Disease, CKD    Hepatic/GI:   GERD    Neurological:   H/O CEREBRAL Aneureysm s/p clipping   Endocrine:   Hyperthyroidism        Physical Exam  General: Cooperative, Alert and Oriented    Airway:  Mallampati: II   Mouth Opening: Normal  TM Distance: Normal  Tongue: Normal  Neck ROM: Normal ROM    Dental:  Intact        Anesthesia Plan  Type of Anesthesia, risks & benefits discussed:    Anesthesia Type: MAC  Intra-op Monitoring Plan: Standard ASA Monitors  Induction:  IV  Informed Consent: Informed consent signed with the Patient and all parties understand the risks and agree with anesthesia plan.  All questions answered.   ASA Score: 3    Ready For Surgery From Anesthesia Perspective.     .

## 2022-11-07 NOTE — PLAN OF CARE
Jessica Floyd has met all discharge criteria from Phase II. Vital Signs are stable, ambulating  without difficulty. Discharge instructions given, patient verbalized understanding. Discharged from facility via wheelchair in stable condition.

## 2022-11-07 NOTE — OP NOTE
SURGEON:  Higinio Cristina M.D.    PREOPERATIVE DIAGNOSIS:    Nuclear Sclerotic Cataract Right Eye    POSTOPERATIVE DIAGNOSIS:    Nuclear Sclerotic Cataract Right Eye    PROCEDURES:    Phacoemulsification with  intraocular lens, Right eye (38066)    DATE OF SURGERY: 11/07/2022    IMPLANT: diboo 23.5    ANESTHESIA:  MAC with topical Lidocaine    COMPLICATIONS:  None    ESTIMATED BLOOD LOSS: None    SPECIMENS: None    INDICATIONS:    The patient has a history of painless progressive visual loss and difficulty with activities of daily living, which specifically include difficult driving at night due to glare and difficulty reading small print, secondary to cataract formation.  After a thorough discussion of the risks, benefits, and alternatives to cataract surgery, including, but not limited to, the rare risks of infection, retinal detachment, hemorrhage, need for additional surgery, loss of vision, and even loss of the eye, the patient voices understanding and desires to proceed.    DESCRIPTION OF PROCEDURE:    The patients IOL calculations were reviewed, and the lens selection confirmed.   After verification and marking of the proper eye in the preop holding area, the patient was brought to the operating room in supine position where the eye was prepped and draped in standard sterile fashion with 5% Betadine and a lid speculum placed in the eye.   Topical 4% Lidocaine was used in addition to the preoperative anesthesia and the procedure was begun by the creation of a paracentesis incision through which viscoelastic was used to fill the anterior chamber.  Next, a keratome blade was used to create a triplanar temporal clear corneal incision and a cystotome and Utrata forceps used to fashion a continuous curvilinear capsulorrhexis.  Hydrodissection was carried out using the Islas hydrodissection cannula and the nucleus was found to be mobile.  Phacoemulsification of the nucleus was carried out using a quick chop technique,  and all remaining epinuclear and cortical material was removed.  The eye was then reformed with Viscoelastic and the  intraocular lens was implanted into the capsular bag.  All remaining viscoelastics were removed from the eye and at the end of the case the pupil was round, the lens was well-centered within the capsular bag and all wounds were found to be water tight.  Drops of Vigamox and Pred Forte were instilled and a shield was placed over the eye. The patient will follow up with Dr. Cristina in the morning.

## 2022-11-08 ENCOUNTER — OFFICE VISIT (OUTPATIENT)
Dept: OPHTHALMOLOGY | Facility: CLINIC | Age: 72
End: 2022-11-08
Attending: OPHTHALMOLOGY
Payer: MEDICARE

## 2022-11-08 DIAGNOSIS — Z98.890 POST-OPERATIVE STATE: Primary | ICD-10-CM

## 2022-11-08 DIAGNOSIS — H25.11 NUCLEAR SCLEROTIC CATARACT OF RIGHT EYE: ICD-10-CM

## 2022-11-08 PROBLEM — R26.2 DIFFICULTY WALKING: Status: RESOLVED | Noted: 2022-04-27 | Resolved: 2022-11-08

## 2022-11-08 PROCEDURE — 99024 POSTOP FOLLOW-UP VISIT: CPT | Mod: S$GLB,,, | Performed by: OPHTHALMOLOGY

## 2022-11-08 PROCEDURE — 99999 PR PBB SHADOW E&M-EST. PATIENT-LVL I: ICD-10-PCS | Mod: PBBFAC,,, | Performed by: OPHTHALMOLOGY

## 2022-11-08 PROCEDURE — 1126F PR PAIN SEVERITY QUANTIFIED, NO PAIN PRESENT: ICD-10-PCS | Mod: CPTII,S$GLB,, | Performed by: OPHTHALMOLOGY

## 2022-11-08 PROCEDURE — 3044F HG A1C LEVEL LT 7.0%: CPT | Mod: CPTII,S$GLB,, | Performed by: OPHTHALMOLOGY

## 2022-11-08 PROCEDURE — 3044F PR MOST RECENT HEMOGLOBIN A1C LEVEL <7.0%: ICD-10-PCS | Mod: CPTII,S$GLB,, | Performed by: OPHTHALMOLOGY

## 2022-11-08 PROCEDURE — 1126F AMNT PAIN NOTED NONE PRSNT: CPT | Mod: CPTII,S$GLB,, | Performed by: OPHTHALMOLOGY

## 2022-11-08 PROCEDURE — 99999 PR PBB SHADOW E&M-EST. PATIENT-LVL I: CPT | Mod: PBBFAC,,, | Performed by: OPHTHALMOLOGY

## 2022-11-08 PROCEDURE — 99024 PR POST-OP FOLLOW-UP VISIT: ICD-10-PCS | Mod: S$GLB,,, | Performed by: OPHTHALMOLOGY

## 2022-11-08 NOTE — PROGRESS NOTES
HPI     Post-op Evaluation            Comments: Jessica Floyd is a 73 y/o female           Comments    Pt here for 1 day  PO   Pt state no complaints at this time     DATE OF SURGERY: 11/07/2022     IMPLANT: diboo 23.5            Last edited by Yanni Camp on 11/8/2022  8:52 AM.            Assessment /Plan     For exam results, see Encounter Report.    Post-operative state    Nuclear sclerotic cataract of right eye      Slit lamp exam:  L/L: nl  K: clear, wound sealed  AC: 1+ cell  Lens: IOL centered and stable    POD1 s/p Phaco/IOL  Appropriate precautions and post op medications reviewed.  Patient instructed to call or come in if symptoms of redness, decreased vision, or pain are experienced.

## 2022-11-09 ENCOUNTER — PATIENT MESSAGE (OUTPATIENT)
Dept: OPHTHALMOLOGY | Facility: CLINIC | Age: 72
End: 2022-11-09
Payer: MEDICARE

## 2022-11-14 ENCOUNTER — OFFICE VISIT (OUTPATIENT)
Dept: SPORTS MEDICINE | Facility: CLINIC | Age: 72
End: 2022-11-14
Payer: MEDICARE

## 2022-11-14 VITALS — WEIGHT: 147 LBS | HEIGHT: 64 IN | BODY MASS INDEX: 25.1 KG/M2

## 2022-11-14 DIAGNOSIS — M17.12 PRIMARY OSTEOARTHRITIS OF LEFT KNEE: Primary | ICD-10-CM

## 2022-11-14 PROCEDURE — 3288F PR FALLS RISK ASSESSMENT DOCUMENTED: ICD-10-PCS | Mod: CPTII,S$GLB,, | Performed by: PHYSICIAN ASSISTANT

## 2022-11-14 PROCEDURE — 1159F PR MEDICATION LIST DOCUMENTED IN MEDICAL RECORD: ICD-10-PCS | Mod: CPTII,S$GLB,, | Performed by: PHYSICIAN ASSISTANT

## 2022-11-14 PROCEDURE — 3008F PR BODY MASS INDEX (BMI) DOCUMENTED: ICD-10-PCS | Mod: CPTII,S$GLB,, | Performed by: PHYSICIAN ASSISTANT

## 2022-11-14 PROCEDURE — 3008F BODY MASS INDEX DOCD: CPT | Mod: CPTII,S$GLB,, | Performed by: PHYSICIAN ASSISTANT

## 2022-11-14 PROCEDURE — 1160F RVW MEDS BY RX/DR IN RCRD: CPT | Mod: CPTII,S$GLB,, | Performed by: PHYSICIAN ASSISTANT

## 2022-11-14 PROCEDURE — 20610 DRAIN/INJ JOINT/BURSA W/O US: CPT | Mod: LT,S$GLB,, | Performed by: PHYSICIAN ASSISTANT

## 2022-11-14 PROCEDURE — 1101F PT FALLS ASSESS-DOCD LE1/YR: CPT | Mod: CPTII,S$GLB,, | Performed by: PHYSICIAN ASSISTANT

## 2022-11-14 PROCEDURE — 99999 PR PBB SHADOW E&M-EST. PATIENT-LVL III: ICD-10-PCS | Mod: PBBFAC,,, | Performed by: PHYSICIAN ASSISTANT

## 2022-11-14 PROCEDURE — 1160F PR REVIEW ALL MEDS BY PRESCRIBER/CLIN PHARMACIST DOCUMENTED: ICD-10-PCS | Mod: CPTII,S$GLB,, | Performed by: PHYSICIAN ASSISTANT

## 2022-11-14 PROCEDURE — 20610 LARGE JOINT ASPIRATION/INJECTION: L KNEE: ICD-10-PCS | Mod: LT,S$GLB,, | Performed by: PHYSICIAN ASSISTANT

## 2022-11-14 PROCEDURE — 1125F PR PAIN SEVERITY QUANTIFIED, PAIN PRESENT: ICD-10-PCS | Mod: CPTII,S$GLB,, | Performed by: PHYSICIAN ASSISTANT

## 2022-11-14 PROCEDURE — 99499 NO LOS: ICD-10-PCS | Mod: S$GLB,,, | Performed by: PHYSICIAN ASSISTANT

## 2022-11-14 PROCEDURE — 1125F AMNT PAIN NOTED PAIN PRSNT: CPT | Mod: CPTII,S$GLB,, | Performed by: PHYSICIAN ASSISTANT

## 2022-11-14 PROCEDURE — 3044F HG A1C LEVEL LT 7.0%: CPT | Mod: CPTII,S$GLB,, | Performed by: PHYSICIAN ASSISTANT

## 2022-11-14 PROCEDURE — 99499 UNLISTED E&M SERVICE: CPT | Mod: S$GLB,,, | Performed by: PHYSICIAN ASSISTANT

## 2022-11-14 PROCEDURE — 3044F PR MOST RECENT HEMOGLOBIN A1C LEVEL <7.0%: ICD-10-PCS | Mod: CPTII,S$GLB,, | Performed by: PHYSICIAN ASSISTANT

## 2022-11-14 PROCEDURE — 99999 PR PBB SHADOW E&M-EST. PATIENT-LVL III: CPT | Mod: PBBFAC,,, | Performed by: PHYSICIAN ASSISTANT

## 2022-11-14 PROCEDURE — 1101F PR PT FALLS ASSESS DOC 0-1 FALLS W/OUT INJ PAST YR: ICD-10-PCS | Mod: CPTII,S$GLB,, | Performed by: PHYSICIAN ASSISTANT

## 2022-11-14 PROCEDURE — 1159F MED LIST DOCD IN RCRD: CPT | Mod: CPTII,S$GLB,, | Performed by: PHYSICIAN ASSISTANT

## 2022-11-14 PROCEDURE — 3288F FALL RISK ASSESSMENT DOCD: CPT | Mod: CPTII,S$GLB,, | Performed by: PHYSICIAN ASSISTANT

## 2022-11-14 NOTE — PROGRESS NOTES
"Subjective:     CC: left knee pain/osteoarthritis    Jessica is a 72 y.o. female coming in today for their 1st Euflexxa injection to the left knee.     Objective:     VITAL SIGNS: Ht 5' 4" (1.626 m)   Wt 66.7 kg (147 lb)   BMI 25.23 kg/m²      Euflexxa Injection Procedure #1     A time out was performed, including verification of patient ID, procedure, site and side, availability of information and equipment, review of safety issues, and agreement with consent, the procedure site was marked.    Location: Knee joint, left     Procedure: The patient was prepped aseptically with alcohol and chlorhexidine. Ethyl Chloride spray was used prior to skin puncture to help numb the superficial skin. After cold spray was applied, using a 22G, 1.5" needle 2cc of Euflexxa was injected into the knee joint. The patient was in the seated position during the duration of this procedure and the injection approach was from the anterolateral aspect.       Patient tolerance: The patient tolerated the procedure well with no immediate complications. There were no adverse reactions to the medication. Patient was instructed to apply ice to the joint for up to 20 minutes at a time and avoid strenuous activities for 24-36 hours following the injection. Following that time, the patient can resume activities as prior to the injection.     The patient was reminded to call the clinic immediately for any adverse side effects as explained in clinic today.     Euflexxa:  NDC: [address]-69  Product Code: [id number]  Lot: [id number]  Exp: 09-      Assessment:      Encounter Diagnosis   Name Primary?    Primary osteoarthritis of left knee Yes          Plan:     1. Euflexxa injection of left knee received today (see procedure note above)  2. Follow-up in 1 week for 2nd injection of 3 injection series  3. Patient agreeable to today's plan and all questions were answered      This note is dictated using the M*Modal Fluency Direct word " recognition program. There are word recognition mistakes that are occasionally missed on review.          Large Joint Aspiration/Injection: L knee    Date/Time: 11/14/2022 8:30 AM  Performed by: Armani Cai PA-C  Authorized by: Armani Cai PA-C     Consent Done?:  Yes (Verbal)  Indications:  Pain and arthritis  Site marked: the procedure site was marked    Timeout: prior to procedure the correct patient, procedure, and site was verified    Prep: patient was prepped and draped in usual sterile fashion      Local anesthesia used?: Yes    Local anesthetic:  Co-phenylcaine spray    Details:  Needle Size:  22 G  Ultrasonic Guidance for needle placement?: No    Approach:  Anterolateral  Location:  Knee  Site:  L knee  Medications:  20 mg sodium hyaluronate (EUFLEXXA) 10 mg/mL(mw 2.4 -3.6 million)  Patient tolerance:  Patient tolerated the procedure well with no immediate complications

## 2022-11-15 ENCOUNTER — OFFICE VISIT (OUTPATIENT)
Dept: OPHTHALMOLOGY | Facility: CLINIC | Age: 72
End: 2022-11-15
Attending: OPHTHALMOLOGY
Payer: MEDICARE

## 2022-11-15 DIAGNOSIS — H25.13 NUCLEAR SCLEROSIS, BILATERAL: ICD-10-CM

## 2022-11-15 DIAGNOSIS — Z98.890 POST-OPERATIVE STATE: Primary | ICD-10-CM

## 2022-11-15 PROCEDURE — 1160F PR REVIEW ALL MEDS BY PRESCRIBER/CLIN PHARMACIST DOCUMENTED: ICD-10-PCS | Mod: CPTII,S$GLB,, | Performed by: OPHTHALMOLOGY

## 2022-11-15 PROCEDURE — 3044F PR MOST RECENT HEMOGLOBIN A1C LEVEL <7.0%: ICD-10-PCS | Mod: CPTII,S$GLB,, | Performed by: OPHTHALMOLOGY

## 2022-11-15 PROCEDURE — 1160F RVW MEDS BY RX/DR IN RCRD: CPT | Mod: CPTII,S$GLB,, | Performed by: OPHTHALMOLOGY

## 2022-11-15 PROCEDURE — 99999 PR PBB SHADOW E&M-EST. PATIENT-LVL III: CPT | Mod: PBBFAC,,, | Performed by: OPHTHALMOLOGY

## 2022-11-15 PROCEDURE — 1159F PR MEDICATION LIST DOCUMENTED IN MEDICAL RECORD: ICD-10-PCS | Mod: CPTII,S$GLB,, | Performed by: OPHTHALMOLOGY

## 2022-11-15 PROCEDURE — 3288F PR FALLS RISK ASSESSMENT DOCUMENTED: ICD-10-PCS | Mod: CPTII,S$GLB,, | Performed by: OPHTHALMOLOGY

## 2022-11-15 PROCEDURE — 1101F PR PT FALLS ASSESS DOC 0-1 FALLS W/OUT INJ PAST YR: ICD-10-PCS | Mod: CPTII,S$GLB,, | Performed by: OPHTHALMOLOGY

## 2022-11-15 PROCEDURE — 1101F PT FALLS ASSESS-DOCD LE1/YR: CPT | Mod: CPTII,S$GLB,, | Performed by: OPHTHALMOLOGY

## 2022-11-15 PROCEDURE — 92136 OPHTHALMIC BIOMETRY: CPT | Mod: 26,LT,S$GLB, | Performed by: OPHTHALMOLOGY

## 2022-11-15 PROCEDURE — 3288F FALL RISK ASSESSMENT DOCD: CPT | Mod: CPTII,S$GLB,, | Performed by: OPHTHALMOLOGY

## 2022-11-15 PROCEDURE — 99024 POSTOP FOLLOW-UP VISIT: CPT | Mod: S$GLB,,, | Performed by: OPHTHALMOLOGY

## 2022-11-15 PROCEDURE — 1126F AMNT PAIN NOTED NONE PRSNT: CPT | Mod: CPTII,S$GLB,, | Performed by: OPHTHALMOLOGY

## 2022-11-15 PROCEDURE — 3044F HG A1C LEVEL LT 7.0%: CPT | Mod: CPTII,S$GLB,, | Performed by: OPHTHALMOLOGY

## 2022-11-15 PROCEDURE — 1159F MED LIST DOCD IN RCRD: CPT | Mod: CPTII,S$GLB,, | Performed by: OPHTHALMOLOGY

## 2022-11-15 PROCEDURE — 99024 PR POST-OP FOLLOW-UP VISIT: ICD-10-PCS | Mod: S$GLB,,, | Performed by: OPHTHALMOLOGY

## 2022-11-15 PROCEDURE — 1126F PR PAIN SEVERITY QUANTIFIED, NO PAIN PRESENT: ICD-10-PCS | Mod: CPTII,S$GLB,, | Performed by: OPHTHALMOLOGY

## 2022-11-15 PROCEDURE — 99999 PR PBB SHADOW E&M-EST. PATIENT-LVL III: ICD-10-PCS | Mod: PBBFAC,,, | Performed by: OPHTHALMOLOGY

## 2022-11-15 PROCEDURE — 92136 IOL MASTER - OU - BOTH EYES: ICD-10-PCS | Mod: 26,LT,S$GLB, | Performed by: OPHTHALMOLOGY

## 2022-11-15 RX ORDER — PHENYLEPHRINE HYDROCHLORIDE 100 MG/ML
1 SOLUTION/ DROPS OPHTHALMIC
Status: CANCELLED | OUTPATIENT
Start: 2022-11-15

## 2022-11-15 RX ORDER — PHENYLEPHRINE HYDROCHLORIDE 25 MG/ML
1 SOLUTION/ DROPS OPHTHALMIC
Status: CANCELLED | OUTPATIENT
Start: 2022-11-15

## 2022-11-15 RX ORDER — MOXIFLOXACIN 5 MG/ML
1 SOLUTION/ DROPS OPHTHALMIC
Status: CANCELLED | OUTPATIENT
Start: 2022-11-15

## 2022-11-15 RX ORDER — TROPICAMIDE 10 MG/ML
1 SOLUTION/ DROPS OPHTHALMIC
Status: CANCELLED | OUTPATIENT
Start: 2022-11-15

## 2022-11-15 RX ORDER — TETRACAINE HYDROCHLORIDE 5 MG/ML
1 SOLUTION OPHTHALMIC
Status: CANCELLED | OUTPATIENT
Start: 2022-11-15

## 2022-11-15 NOTE — H&P (VIEW-ONLY)
HPI     Post-op Evaluation            Comments: Jessica Floyd is a 71 y/o female           Comments    Pt here for 1 week Post Op   Pt state no complaints at this time.   PGB TID OD    DATE OF SURGERY: 11/07/2022       IMPLANT: diboo 23.5             Last edited by Yanni Camp on 11/15/2022  1:39 PM.            Assessment /Plan     For exam results, see Encounter Report.    Post-operative state    Nuclear sclerosis, bilateral      Slit lamp exam:  L/L: nl  K: clear, wound sealed  AC: trace cell  Iris/Lens: IOL centered and stable    POW1 s/p phaco: Surgery healing well with no signs of infection or abnormal inflammation.    Patient wishes to proceed with surgery in the second eye. Risks, benefits, alternatives reviewed. IOL selection reviewed.    Left eye  IOL: diboo 23.0 (Amblyopia)    Amblyopia OS, functionally monocular

## 2022-11-16 ENCOUNTER — TELEPHONE (OUTPATIENT)
Dept: OPHTHALMOLOGY | Facility: CLINIC | Age: 72
End: 2022-11-16
Payer: MEDICARE

## 2022-11-16 NOTE — TELEPHONE ENCOUNTER
Patient given arrival time of 9:00 am on Monday November 21 . Nothing to eat or drink after 9 pm.  Water, Gatorade after 9 pm until leaves home.  Start drops into the operative eye today. [address]

## 2022-11-21 ENCOUNTER — ANESTHESIA (OUTPATIENT)
Dept: SURGERY | Facility: OTHER | Age: 72
End: 2022-11-21
Payer: MEDICARE

## 2022-11-21 ENCOUNTER — HOSPITAL ENCOUNTER (OUTPATIENT)
Facility: OTHER | Age: 72
Discharge: HOME | End: 2022-11-21
Attending: OPHTHALMOLOGY | Admitting: OPHTHALMOLOGY
Payer: MEDICARE

## 2022-11-21 ENCOUNTER — ANESTHESIA EVENT (OUTPATIENT)
Dept: SURGERY | Facility: OTHER | Age: 72
End: 2022-11-21
Payer: MEDICARE

## 2022-11-21 VITALS
BODY MASS INDEX: 25.1 KG/M2 | HEART RATE: 75 BPM | TEMPERATURE: 98 F | DIASTOLIC BLOOD PRESSURE: 63 MMHG | WEIGHT: 147 LBS | SYSTOLIC BLOOD PRESSURE: 129 MMHG | HEIGHT: 64 IN | RESPIRATION RATE: 18 BRPM | OXYGEN SATURATION: 94 %

## 2022-11-21 DIAGNOSIS — H25.13 NUCLEAR SCLEROSIS, BILATERAL: ICD-10-CM

## 2022-11-21 DIAGNOSIS — Z98.890 POST-OPERATIVE STATE: ICD-10-CM

## 2022-11-21 DIAGNOSIS — H25.12 NUCLEAR SCLEROTIC CATARACT OF LEFT EYE: Primary | ICD-10-CM

## 2022-11-21 PROCEDURE — 66984 XCAPSL CTRC RMVL W/O ECP: CPT | Mod: 79,LT,, | Performed by: OPHTHALMOLOGY

## 2022-11-21 PROCEDURE — 63600175 PHARM REV CODE 636 W HCPCS: Performed by: NURSE ANESTHETIST, CERTIFIED REGISTERED

## 2022-11-21 PROCEDURE — 37000009 HC ANESTHESIA EA ADD 15 MINS: Performed by: OPHTHALMOLOGY

## 2022-11-21 PROCEDURE — 36000706: Performed by: OPHTHALMOLOGY

## 2022-11-21 PROCEDURE — 66984 PR REMOVAL, CATARACT, W/INSRT INTRAOC LENS, W/O ENDO CYCLO: ICD-10-PCS | Mod: 79,LT,, | Performed by: OPHTHALMOLOGY

## 2022-11-21 PROCEDURE — 71000015 HC POSTOP RECOV 1ST HR: Performed by: OPHTHALMOLOGY

## 2022-11-21 PROCEDURE — 36000707: Performed by: OPHTHALMOLOGY

## 2022-11-21 PROCEDURE — 25000003 PHARM REV CODE 250: Performed by: OPHTHALMOLOGY

## 2022-11-21 PROCEDURE — 37000008 HC ANESTHESIA 1ST 15 MINUTES: Performed by: OPHTHALMOLOGY

## 2022-11-21 PROCEDURE — V2632 POST CHMBR INTRAOCULAR LENS: HCPCS | Performed by: OPHTHALMOLOGY

## 2022-11-21 DEVICE — LENS EYHANCE +23.0D: Type: IMPLANTABLE DEVICE | Site: EYE | Status: FUNCTIONAL

## 2022-11-21 RX ORDER — ACETAMINOPHEN 325 MG/1
650 TABLET ORAL EVERY 4 HOURS PRN
Status: DISCONTINUED | OUTPATIENT
Start: 2022-11-21 | End: 2022-11-21 | Stop reason: HOSPADM

## 2022-11-21 RX ORDER — LIDOCAINE HYDROCHLORIDE 40 MG/ML
INJECTION, SOLUTION RETROBULBAR
Status: DISCONTINUED | OUTPATIENT
Start: 2022-11-21 | End: 2022-11-21 | Stop reason: HOSPADM

## 2022-11-21 RX ORDER — MIDAZOLAM HYDROCHLORIDE 1 MG/ML
INJECTION INTRAMUSCULAR; INTRAVENOUS
Status: DISCONTINUED | OUTPATIENT
Start: 2022-11-21 | End: 2022-11-21

## 2022-11-21 RX ORDER — FENTANYL CITRATE 50 UG/ML
INJECTION, SOLUTION INTRAMUSCULAR; INTRAVENOUS
Status: DISCONTINUED | OUTPATIENT
Start: 2022-11-21 | End: 2022-11-21

## 2022-11-21 RX ORDER — PHENYLEPHRINE HYDROCHLORIDE 25 MG/ML
1 SOLUTION/ DROPS OPHTHALMIC
Status: COMPLETED | OUTPATIENT
Start: 2022-11-21 | End: 2022-11-21

## 2022-11-21 RX ORDER — PHENYLEPHRINE HYDROCHLORIDE 100 MG/ML
1 SOLUTION/ DROPS OPHTHALMIC
Status: DISCONTINUED | OUTPATIENT
Start: 2022-11-21 | End: 2022-11-21 | Stop reason: HOSPADM

## 2022-11-21 RX ORDER — TETRACAINE HYDROCHLORIDE 5 MG/ML
1 SOLUTION OPHTHALMIC
Status: COMPLETED | OUTPATIENT
Start: 2022-11-21 | End: 2022-11-21

## 2022-11-21 RX ORDER — MOXIFLOXACIN 5 MG/ML
1 SOLUTION/ DROPS OPHTHALMIC
Status: COMPLETED | OUTPATIENT
Start: 2022-11-21 | End: 2022-11-21

## 2022-11-21 RX ORDER — TETRACAINE HYDROCHLORIDE 5 MG/ML
SOLUTION OPHTHALMIC
Status: DISCONTINUED | OUTPATIENT
Start: 2022-11-21 | End: 2022-11-21 | Stop reason: HOSPADM

## 2022-11-21 RX ORDER — TROPICAMIDE 10 MG/ML
1 SOLUTION/ DROPS OPHTHALMIC
Status: COMPLETED | OUTPATIENT
Start: 2022-11-21 | End: 2022-11-21

## 2022-11-21 RX ORDER — MOXIFLOXACIN 5 MG/ML
SOLUTION/ DROPS OPHTHALMIC
Status: DISCONTINUED | OUTPATIENT
Start: 2022-11-21 | End: 2022-11-21 | Stop reason: HOSPADM

## 2022-11-21 RX ORDER — PROPARACAINE HYDROCHLORIDE 5 MG/ML
1 SOLUTION/ DROPS OPHTHALMIC
Status: DISCONTINUED | OUTPATIENT
Start: 2022-11-21 | End: 2022-11-21 | Stop reason: HOSPADM

## 2022-11-21 RX ADMIN — MOXIFLOXACIN 1 DROP: 5 SOLUTION/ DROPS OPHTHALMIC at 10:11

## 2022-11-21 RX ADMIN — MOXIFLOXACIN 1 DROP: 5 SOLUTION/ DROPS OPHTHALMIC at 11:11

## 2022-11-21 RX ADMIN — TROPICAMIDE 1 DROP: 10 SOLUTION/ DROPS OPHTHALMIC at 10:11

## 2022-11-21 RX ADMIN — TETRACAINE HYDROCHLORIDE 1 DROP: 5 SOLUTION OPHTHALMIC at 09:11

## 2022-11-21 RX ADMIN — FENTANYL CITRATE 100 MCG: 50 INJECTION, SOLUTION INTRAMUSCULAR; INTRAVENOUS at 10:11

## 2022-11-21 RX ADMIN — TETRACAINE HYDROCHLORIDE 1 DROP: 5 SOLUTION OPHTHALMIC at 10:11

## 2022-11-21 RX ADMIN — TROPICAMIDE 1 DROP: 10 SOLUTION/ DROPS OPHTHALMIC at 09:11

## 2022-11-21 RX ADMIN — MOXIFLOXACIN 1 DROP: 5 SOLUTION/ DROPS OPHTHALMIC at 09:11

## 2022-11-21 RX ADMIN — PHENYLEPHRINE HYDROCHLORIDE 1 DROP: 25 SOLUTION/ DROPS OPHTHALMIC at 09:11

## 2022-11-21 RX ADMIN — PHENYLEPHRINE HYDROCHLORIDE 1 DROP: 25 SOLUTION/ DROPS OPHTHALMIC at 10:11

## 2022-11-21 RX ADMIN — MIDAZOLAM HYDROCHLORIDE 2 MG: 1 INJECTION, SOLUTION INTRAMUSCULAR; INTRAVENOUS at 10:11

## 2022-11-21 NOTE — DISCHARGE SUMMARY
Outcome: Successful outpatient ophthalmic surgical procedure  Preprinted Instructions given to patient.  Regular diet.  Activity: No restrictions  Meds: see Med Rec  Condition: stable  Follow up: 1 day with Dr Cristina  Disposition: Home  Diagnosis: s/p eye surgery  Date of discharge: 11/21/2022

## 2022-11-21 NOTE — OP NOTE
SURGEON:  Higinio Cristina M.D.    PREOPERATIVE DIAGNOSIS:    Nuclear Sclerotic Cataract Left Eye    POSTOPERATIVE DIAGNOSIS:    Nuclear Sclerotic Cataract Left Eye    PROCEDURES:    Phacoemulsification with  intraocular lens, Left eye (70514)    DATE OF SURGERY: 11/21/2022    IMPLANT: DIBOO 23.0    ANESTHESIA:  MAC with topical Lidocaine    COMPLICATIONS:  Projectile cannula with hyphema    ESTIMATED BLOOD LOSS: None    SPECIMENS: None    INDICATIONS:    The patient has a history of painless progressive visual loss and difficulty with activities of daily living, which specifically include difficult driving at night due to glare and difficulty reading small print, secondary to cataract formation.  After a thorough discussion of the risks, benefits, and alternatives to cataract surgery, including, but not limited to, the rare risks of infection, retinal detachment, hemorrhage, need for additional surgery, loss of vision, and even loss of the eye, the patient voices understanding and desires to proceed.    DESCRIPTION OF PROCEDURE:    The patients IOL calculations were reviewed, and the lens selection confirmed.   After verification and marking of the proper eye in the preop holding area, the patient was brought to the operating room in supine position where the eye was prepped and draped in standard sterile fashion with 5% Betadine and a lid speculum placed in the eye.   Topical 4% Lidocaine was used in addition to the preoperative anesthesia and the procedure was begun by the creation of a paracentesis incision through which viscoelastic was used to fill the anterior chamber.  Next, a keratome blade was used to create a triplanar temporal clear corneal incision and a cystotome and Utrata forceps used to fashion a continuous curvilinear capsulorrhexis.  Hydrodissection was carried out using the Islas hydrodissection cannula and the nucleus was found to be mobile.  Phacoemulsification of the nucleus was carried out using  a quick chop technique, and all remaining epinuclear and cortical material was removed.  The eye was then reformed with Viscoelastic and the  intraocular lens was implanted into the capsular bag.  All remaining viscoelastics were removed from the eye and at the end of the case the pupil was round, the lens was well-centered within the capsular bag and all wounds were found to be water tight.  Drops of Vigamox and Pred Forte were instilled and a shield was placed over the eye. The patient will follow up with Dr. Cristina in the morning.

## 2022-11-21 NOTE — ANESTHESIA PREPROCEDURE EVALUATION
11/21/2022  Jessica Floyd is a 72 y.o., female.      Pre-op Assessment    I have reviewed the Patient Summary Reports.     I have reviewed the Nursing Notes. I have reviewed the NPO Status.   I have reviewed the Medications.     Review of Systems  Anesthesia Hx:  Denies Family Hx of Anesthesia complications.   Denies Personal Hx of Anesthesia complications.   Social:  Former Smoker    Hematology/Oncology:  Hematology Normal   Oncology Normal     Cardiovascular:   Hypertension CAD   PVD hyperlipidemia    Pulmonary:  Pulmonary Normal    Renal/:   Chronic Renal Disease, CKD    Hepatic/GI:   GERD    Neurological:   H/O CEREBRAL Aneureysm s/p clipping   Endocrine:   Hyperthyroidism        Physical Exam  General: Cooperative, Alert and Oriented    Airway:  Mallampati: II   Mouth Opening: Normal  TM Distance: Normal  Tongue: Normal  Neck ROM: Normal ROM    Dental:  Intact        Anesthesia Plan  Type of Anesthesia, risks & benefits discussed:    Anesthesia Type: MAC  Intra-op Monitoring Plan: Standard ASA Monitors  Induction:  IV  Informed Consent: Informed consent signed with the Patient and all parties understand the risks and agree with anesthesia plan.  All questions answered.   ASA Score: 3    Ready For Surgery From Anesthesia Perspective.     .

## 2022-11-21 NOTE — ANESTHESIA POSTPROCEDURE EVALUATION
Anesthesia Post Evaluation    Patient: Jessica Floyd    Procedure(s) Performed: Procedure(s) (LRB):  EXTRACTION, CATARACT, WITH IOL INSERTION (Left)    Final Anesthesia Type: MAC      Patient location during evaluation: [facility]  Patient participation: Yes- Able to Participate  Level of consciousness: awake and alert  Post-procedure vital signs: reviewed and stable  Pain management: adequate  Airway patency: patent    PONV status at discharge: No PONV  Anesthetic complications: no      Cardiovascular status: blood pressure returned to baseline  Respiratory status: unassisted and spontaneous ventilation  Hydration status: euvolemic  Follow-up not needed.          Vitals Value Taken Time   /83 11/21/22 0955   Temp 36.7 °C (98 °F) 11/21/22 0955   Pulse 72 11/21/22 0955   Resp 16 11/21/22 0955   SpO2 96 % 11/21/22 0955         No case tracking events are documented in the log.      Pain/Paris Score: No data recorded

## 2022-11-22 ENCOUNTER — OFFICE VISIT (OUTPATIENT)
Dept: OPHTHALMOLOGY | Facility: CLINIC | Age: 72
End: 2022-11-22
Attending: OPHTHALMOLOGY
Payer: MEDICARE

## 2022-11-22 DIAGNOSIS — Z98.890 POST-OPERATIVE STATE: ICD-10-CM

## 2022-11-22 DIAGNOSIS — H25.13 NUCLEAR SCLEROSIS, BILATERAL: Primary | ICD-10-CM

## 2022-11-22 PROCEDURE — 99999 PR PBB SHADOW E&M-EST. PATIENT-LVL III: ICD-10-PCS | Mod: PBBFAC,,, | Performed by: OPHTHALMOLOGY

## 2022-11-22 PROCEDURE — 3044F HG A1C LEVEL LT 7.0%: CPT | Mod: CPTII,S$GLB,, | Performed by: OPHTHALMOLOGY

## 2022-11-22 PROCEDURE — 3044F PR MOST RECENT HEMOGLOBIN A1C LEVEL <7.0%: ICD-10-PCS | Mod: CPTII,S$GLB,, | Performed by: OPHTHALMOLOGY

## 2022-11-22 PROCEDURE — 1160F RVW MEDS BY RX/DR IN RCRD: CPT | Mod: CPTII,S$GLB,, | Performed by: OPHTHALMOLOGY

## 2022-11-22 PROCEDURE — 3288F PR FALLS RISK ASSESSMENT DOCUMENTED: ICD-10-PCS | Mod: CPTII,S$GLB,, | Performed by: OPHTHALMOLOGY

## 2022-11-22 PROCEDURE — 99024 POSTOP FOLLOW-UP VISIT: CPT | Mod: S$GLB,,, | Performed by: OPHTHALMOLOGY

## 2022-11-22 PROCEDURE — 1101F PR PT FALLS ASSESS DOC 0-1 FALLS W/OUT INJ PAST YR: ICD-10-PCS | Mod: CPTII,S$GLB,, | Performed by: OPHTHALMOLOGY

## 2022-11-22 PROCEDURE — 3288F FALL RISK ASSESSMENT DOCD: CPT | Mod: CPTII,S$GLB,, | Performed by: OPHTHALMOLOGY

## 2022-11-22 PROCEDURE — 1159F MED LIST DOCD IN RCRD: CPT | Mod: CPTII,S$GLB,, | Performed by: OPHTHALMOLOGY

## 2022-11-22 PROCEDURE — 1126F AMNT PAIN NOTED NONE PRSNT: CPT | Mod: CPTII,S$GLB,, | Performed by: OPHTHALMOLOGY

## 2022-11-22 PROCEDURE — 1160F PR REVIEW ALL MEDS BY PRESCRIBER/CLIN PHARMACIST DOCUMENTED: ICD-10-PCS | Mod: CPTII,S$GLB,, | Performed by: OPHTHALMOLOGY

## 2022-11-22 PROCEDURE — 1101F PT FALLS ASSESS-DOCD LE1/YR: CPT | Mod: CPTII,S$GLB,, | Performed by: OPHTHALMOLOGY

## 2022-11-22 PROCEDURE — 99999 PR PBB SHADOW E&M-EST. PATIENT-LVL III: CPT | Mod: PBBFAC,,, | Performed by: OPHTHALMOLOGY

## 2022-11-22 PROCEDURE — 1159F PR MEDICATION LIST DOCUMENTED IN MEDICAL RECORD: ICD-10-PCS | Mod: CPTII,S$GLB,, | Performed by: OPHTHALMOLOGY

## 2022-11-22 PROCEDURE — 99024 PR POST-OP FOLLOW-UP VISIT: ICD-10-PCS | Mod: S$GLB,,, | Performed by: OPHTHALMOLOGY

## 2022-11-22 PROCEDURE — 1126F PR PAIN SEVERITY QUANTIFIED, NO PAIN PRESENT: ICD-10-PCS | Mod: CPTII,S$GLB,, | Performed by: OPHTHALMOLOGY

## 2022-11-22 NOTE — PROGRESS NOTES
HPI     Post-op Evaluation            Comments: Jessica Floyd is a 71 y/o female           Comments    Pt here for 1 day Post Op OS   Pt state no complaints at this time     DATE OF SURGERY: 11/21/2022     IMPLANT: DIBOO 23.0          Last edited by Yanni Camp on 11/22/2022  8:50 AM.            Assessment /Plan     For exam results, see Encounter Report.    Nuclear sclerosis, bilateral    Post-operative state      Slit lamp exam:  L/L: nl  K: clear, wound sealed  AC: 1+ cell  Lens: IOL centered and stable    POD1 s/p Phaco/IOL  Appropriate precautions and post op medications reviewed.  Patient instructed to call or come in if symptoms of redness, decreased vision, or pain are experienced.  Tr Heme

## 2022-11-28 ENCOUNTER — OFFICE VISIT (OUTPATIENT)
Dept: PODIATRY | Facility: CLINIC | Age: 72
End: 2022-11-28
Payer: MEDICARE

## 2022-11-28 ENCOUNTER — OFFICE VISIT (OUTPATIENT)
Dept: SPORTS MEDICINE | Facility: CLINIC | Age: 72
End: 2022-11-28
Payer: MEDICARE

## 2022-11-28 VITALS
HEIGHT: 64 IN | WEIGHT: 147 LBS | DIASTOLIC BLOOD PRESSURE: 83 MMHG | SYSTOLIC BLOOD PRESSURE: 152 MMHG | HEART RATE: 86 BPM | BODY MASS INDEX: 25.1 KG/M2

## 2022-11-28 VITALS — HEIGHT: 64 IN | BODY MASS INDEX: 25.1 KG/M2 | WEIGHT: 147 LBS

## 2022-11-28 DIAGNOSIS — M17.12 PRIMARY OSTEOARTHRITIS OF LEFT KNEE: Primary | ICD-10-CM

## 2022-11-28 DIAGNOSIS — B35.1 DERMATOPHYTOSIS OF NAIL: ICD-10-CM

## 2022-11-28 DIAGNOSIS — L84 CORN OR CALLUS: ICD-10-CM

## 2022-11-28 DIAGNOSIS — M79.671 FOOT PAIN, RIGHT: ICD-10-CM

## 2022-11-28 DIAGNOSIS — M20.41 HAMMER TOE OF RIGHT FOOT: ICD-10-CM

## 2022-11-28 DIAGNOSIS — M21.619 BUNION: Primary | ICD-10-CM

## 2022-11-28 PROCEDURE — 1126F PR PAIN SEVERITY QUANTIFIED, NO PAIN PRESENT: ICD-10-PCS | Mod: CPTII,,, | Performed by: PODIATRIST

## 2022-11-28 PROCEDURE — 99999 PR PBB SHADOW E&M-EST. PATIENT-LVL III: ICD-10-PCS | Mod: PBBFAC,,, | Performed by: PHYSICIAN ASSISTANT

## 2022-11-28 PROCEDURE — 3079F DIAST BP 80-89 MM HG: CPT | Mod: CPTII,,, | Performed by: PODIATRIST

## 2022-11-28 PROCEDURE — 3044F PR MOST RECENT HEMOGLOBIN A1C LEVEL <7.0%: ICD-10-PCS | Mod: CPTII,S$GLB,, | Performed by: PHYSICIAN ASSISTANT

## 2022-11-28 PROCEDURE — 1160F PR REVIEW ALL MEDS BY PRESCRIBER/CLIN PHARMACIST DOCUMENTED: ICD-10-PCS | Mod: CPTII,S$GLB,, | Performed by: PHYSICIAN ASSISTANT

## 2022-11-28 PROCEDURE — 1160F PR REVIEW ALL MEDS BY PRESCRIBER/CLIN PHARMACIST DOCUMENTED: ICD-10-PCS | Mod: CPTII,,, | Performed by: PODIATRIST

## 2022-11-28 PROCEDURE — 99499 UNLISTED E&M SERVICE: CPT | Mod: S$GLB,,, | Performed by: PHYSICIAN ASSISTANT

## 2022-11-28 PROCEDURE — 3008F PR BODY MASS INDEX (BMI) DOCUMENTED: ICD-10-PCS | Mod: CPTII,S$GLB,, | Performed by: PHYSICIAN ASSISTANT

## 2022-11-28 PROCEDURE — 3079F PR MOST RECENT DIASTOLIC BLOOD PRESSURE 80-89 MM HG: ICD-10-PCS | Mod: CPTII,,, | Performed by: PODIATRIST

## 2022-11-28 PROCEDURE — 3288F PR FALLS RISK ASSESSMENT DOCUMENTED: ICD-10-PCS | Mod: CPTII,,, | Performed by: PODIATRIST

## 2022-11-28 PROCEDURE — 3288F FALL RISK ASSESSMENT DOCD: CPT | Mod: CPTII,S$GLB,, | Performed by: PHYSICIAN ASSISTANT

## 2022-11-28 PROCEDURE — 3044F HG A1C LEVEL LT 7.0%: CPT | Mod: CPTII,S$GLB,, | Performed by: PHYSICIAN ASSISTANT

## 2022-11-28 PROCEDURE — 20610 DRAIN/INJ JOINT/BURSA W/O US: CPT | Mod: LT,S$GLB,, | Performed by: PHYSICIAN ASSISTANT

## 2022-11-28 PROCEDURE — 99999 PR PBB SHADOW E&M-EST. PATIENT-LVL IV: CPT | Mod: PBBFAC,,, | Performed by: PODIATRIST

## 2022-11-28 PROCEDURE — 99213 PR OFFICE/OUTPT VISIT, EST, LEVL III, 20-29 MIN: ICD-10-PCS | Mod: 25,,, | Performed by: PODIATRIST

## 2022-11-28 PROCEDURE — 3008F BODY MASS INDEX DOCD: CPT | Mod: CPTII,,, | Performed by: PODIATRIST

## 2022-11-28 PROCEDURE — 1125F PR PAIN SEVERITY QUANTIFIED, PAIN PRESENT: ICD-10-PCS | Mod: CPTII,S$GLB,, | Performed by: PHYSICIAN ASSISTANT

## 2022-11-28 PROCEDURE — 1101F PR PT FALLS ASSESS DOC 0-1 FALLS W/OUT INJ PAST YR: ICD-10-PCS | Mod: CPTII,S$GLB,, | Performed by: PHYSICIAN ASSISTANT

## 2022-11-28 PROCEDURE — 1101F PR PT FALLS ASSESS DOC 0-1 FALLS W/OUT INJ PAST YR: ICD-10-PCS | Mod: CPTII,,, | Performed by: PODIATRIST

## 2022-11-28 PROCEDURE — 1101F PT FALLS ASSESS-DOCD LE1/YR: CPT | Mod: CPTII,S$GLB,, | Performed by: PHYSICIAN ASSISTANT

## 2022-11-28 PROCEDURE — 1159F MED LIST DOCD IN RCRD: CPT | Mod: CPTII,S$GLB,, | Performed by: PHYSICIAN ASSISTANT

## 2022-11-28 PROCEDURE — 1159F PR MEDICATION LIST DOCUMENTED IN MEDICAL RECORD: ICD-10-PCS | Mod: CPTII,S$GLB,, | Performed by: PHYSICIAN ASSISTANT

## 2022-11-28 PROCEDURE — 1125F AMNT PAIN NOTED PAIN PRSNT: CPT | Mod: CPTII,S$GLB,, | Performed by: PHYSICIAN ASSISTANT

## 2022-11-28 PROCEDURE — 3288F PR FALLS RISK ASSESSMENT DOCUMENTED: ICD-10-PCS | Mod: CPTII,S$GLB,, | Performed by: PHYSICIAN ASSISTANT

## 2022-11-28 PROCEDURE — 99999 PR PBB SHADOW E&M-EST. PATIENT-LVL III: CPT | Mod: PBBFAC,,, | Performed by: PHYSICIAN ASSISTANT

## 2022-11-28 PROCEDURE — 1159F PR MEDICATION LIST DOCUMENTED IN MEDICAL RECORD: ICD-10-PCS | Mod: CPTII,,, | Performed by: PODIATRIST

## 2022-11-28 PROCEDURE — 1126F AMNT PAIN NOTED NONE PRSNT: CPT | Mod: CPTII,,, | Performed by: PODIATRIST

## 2022-11-28 PROCEDURE — 3008F BODY MASS INDEX DOCD: CPT | Mod: CPTII,S$GLB,, | Performed by: PHYSICIAN ASSISTANT

## 2022-11-28 PROCEDURE — 3288F FALL RISK ASSESSMENT DOCD: CPT | Mod: CPTII,,, | Performed by: PODIATRIST

## 2022-11-28 PROCEDURE — 3044F PR MOST RECENT HEMOGLOBIN A1C LEVEL <7.0%: ICD-10-PCS | Mod: CPTII,,, | Performed by: PODIATRIST

## 2022-11-28 PROCEDURE — 17999 PR NON-COVERED FOOT CARE: ICD-10-PCS | Mod: CSM,S$GLB,, | Performed by: PODIATRIST

## 2022-11-28 PROCEDURE — 3008F PR BODY MASS INDEX (BMI) DOCUMENTED: ICD-10-PCS | Mod: CPTII,,, | Performed by: PODIATRIST

## 2022-11-28 PROCEDURE — 3077F PR MOST RECENT SYSTOLIC BLOOD PRESSURE >= 140 MM HG: ICD-10-PCS | Mod: CPTII,,, | Performed by: PODIATRIST

## 2022-11-28 PROCEDURE — 1101F PT FALLS ASSESS-DOCD LE1/YR: CPT | Mod: CPTII,,, | Performed by: PODIATRIST

## 2022-11-28 PROCEDURE — 3044F HG A1C LEVEL LT 7.0%: CPT | Mod: CPTII,,, | Performed by: PODIATRIST

## 2022-11-28 PROCEDURE — 1160F RVW MEDS BY RX/DR IN RCRD: CPT | Mod: CPTII,S$GLB,, | Performed by: PHYSICIAN ASSISTANT

## 2022-11-28 PROCEDURE — 99499 NO LOS: ICD-10-PCS | Mod: S$GLB,,, | Performed by: PHYSICIAN ASSISTANT

## 2022-11-28 PROCEDURE — 1160F RVW MEDS BY RX/DR IN RCRD: CPT | Mod: CPTII,,, | Performed by: PODIATRIST

## 2022-11-28 PROCEDURE — 20610 LARGE JOINT ASPIRATION/INJECTION: L KNEE: ICD-10-PCS | Mod: LT,S$GLB,, | Performed by: PHYSICIAN ASSISTANT

## 2022-11-28 PROCEDURE — 17999 UNLISTD PX SKN MUC MEMB SUBQ: CPT | Mod: CSM,S$GLB,, | Performed by: PODIATRIST

## 2022-11-28 PROCEDURE — 1159F MED LIST DOCD IN RCRD: CPT | Mod: CPTII,,, | Performed by: PODIATRIST

## 2022-11-28 PROCEDURE — 99213 OFFICE O/P EST LOW 20 MIN: CPT | Mod: 25,,, | Performed by: PODIATRIST

## 2022-11-28 PROCEDURE — 99999 PR PBB SHADOW E&M-EST. PATIENT-LVL IV: ICD-10-PCS | Mod: PBBFAC,,, | Performed by: PODIATRIST

## 2022-11-28 PROCEDURE — 3077F SYST BP >= 140 MM HG: CPT | Mod: CPTII,,, | Performed by: PODIATRIST

## 2022-11-28 RX ORDER — TRIAMCINOLONE ACETONIDE 40 MG/ML
INJECTION, SUSPENSION INTRA-ARTICULAR; INTRAMUSCULAR
COMMUNITY
Start: 2022-08-17 | End: 2022-12-21 | Stop reason: ALTCHOICE

## 2022-11-28 RX ORDER — COVID-19 ANTIGEN TEST
KIT MISCELLANEOUS
COMMUNITY
Start: 2022-10-29 | End: 2022-12-21 | Stop reason: ALTCHOICE

## 2022-11-28 NOTE — PATIENT INSTRUCTIONS
General nail care measures for abnormal nails include:  Keeping nails trimmed short, but avoid overzealous trimming  Avoiding trauma   Avoiding contact irritants   Keeping nails dry (avoiding wet work)  Avoiding all nail cosmetics  Wearing shoes that fit well at the toe box.  Avoid tight shoes.

## 2022-11-28 NOTE — PROGRESS NOTES
"Subjective:     CC: left knee pain/osteoarthritis    Jessica is a 72 y.o. female coming in today for their 2nd Orthovisc injection to the left knee.     Objective:     VITAL SIGNS: Ht 5' 4" (1.626 m)   Wt 66.7 kg (147 lb)   BMI 25.23 kg/m²      Orthovisc Injection Procedure #2     A time out was performed, including verification of patient ID, procedure, site and side, availability of information and equipment, review of safety issues, and agreement with consent, the procedure site was marked.    Location: Knee joint, left     Procedure: The patient was prepped aseptically with alcohol and chlorhexidine. Ethyl Chloride spray was used prior to skin puncture to help numb the superficial skin. After cold spray was applied, using a 22G, 1.5" needle 2cc of Orthovisc was injected into the knee joint. The patient was in the seated position during the duration of this procedure and the injection approach was from the anterolateral aspect.       Patient tolerance: The patient tolerated the procedure well with no immediate complications. There were no adverse reactions to the medication. Patient was instructed to apply ice to the joint for up to 20 minutes at a time and avoid strenuous activities for 24-36 hours following the injection. Following that time, the patient can resume activities as prior to the injection.     The patient was reminded to call the clinic immediately for any adverse side effects as explained in clinic today.     Orthovisc:  NDC: [address]-39  Product Code: [id number]  Lot: [id number]  Exp: 11/30/2024      Assessment:      Encounter Diagnosis   Name Primary?    Primary osteoarthritis of left knee Yes          Plan:     1. Orthovisc injection of left knee received today (see procedure note above)  2. Follow-up in 1 week for 3rd injection of 3 injection series  3. Patient agreeable to today's plan and all questions were answered    Large Joint Aspiration/Injection: L knee    Date/Time: 11/28/2022 " 8:30 AM  Performed by: Armani Cai PA-C  Authorized by: Armani Cai PA-C     Consent Done?:  Yes (Verbal)  Indications:  Pain and arthritis  Site marked: the procedure site was marked    Timeout: prior to procedure the correct patient, procedure, and site was verified    Prep: patient was prepped and draped in usual sterile fashion      Local anesthesia used?: Yes    Local anesthetic:  Co-phenylcaine spray    Details:  Needle Size:  22 G  Ultrasonic Guidance for needle placement?: No    Approach:  Anterolateral  Location:  Knee  Site:  L knee  Medications:  30 mg sodium hyaluronate (orthovisc) 30 mg/2 mL  Patient tolerance:  Patient tolerated the procedure well with no immediate complications    .    This note is dictated using the M*Modal Fluency Direct word recognition program. There are word recognition mistakes that are occasionally missed on review.

## 2022-11-28 NOTE — PROGRESS NOTES
Subjective:      Patient ID: Jessica Floyd is a 72 y.o. female.    Chief Complaint: Foot Problem (Bunion and callous removal and nail cutting)    Chronic progressively worse right bunion for the past several months.  Worse with direct palpation, range of motion, and walking in shoes.  Shoe modification has not helped significant.   She is going to Physical therapy for balance.    She has history of right foot gout.  She has history of surgery right foot.    History of trauma years ago when a suitcase rolled over her foot.       Patient Active Problem List   Diagnosis    Coronary artery disease involving native coronary artery of native heart without angina pectoris    SAH (subarachnoid hemorrhage)    Dyslipidemia    Bilateral carotid artery stenosis    Gastroesophageal reflux disease without esophagitis    Stage 3a chronic kidney disease    Hypertension    Subclinical hyperthyroidism    Viral upper respiratory tract infection    Allergic rhinitis    Nuclear sclerotic cataract of left eye         Current Outpatient Medications on File Prior to Visit   Medication Sig Dispense Refill    allopurinoL (ZYLOPRIM) 100 MG tablet Take 0.5 tablets (50 mg total) by mouth once daily. 30 tablet 11    amLODIPine (NORVASC) 10 MG tablet TAKE 1 TABLET BY MOUTH EVERY DAY 90 tablet 3    amoxicillin (AMOXIL) 500 MG capsule TAKE 2 CAPSULES BY MOUTH NOW, THEN ONE CAPSULE THREE TIMES A DAY UNTIL COMPLETELY FINISHED      atorvastatin (LIPITOR) 80 MG tablet TAKE 1 TABLET BY MOUTH EVERY DAY 90 tablet 3    carvediloL (COREG) 12.5 MG tablet TAKE 1 TABLET BY MOUTH TWICE A DAY WITH MEALS 180 tablet 3    colchicine (MITIGARE) 0.6 mg Cap Take 1 capsule (0.6 mg total) by mouth once daily at 6am. 30 capsule 11    diazePAM (VALIUM) 10 MG Tab TAKE 1 TABLET BY MOUTH 1 HOUR PRIOR TO APPT      fluticasone propionate (FLONASE) 50 mcg/actuation nasal spray SPRAY ONCE INTO EACH NOSTRIL ONCE DAILY 16 mL 3    hydroCHLOROthiazide (HYDRODIURIL) 25 MG tablet TAKE  1 TABLET BY MOUTH EVERY DAY 90 tablet 3    HYDROcodone-acetaminophen (NORCO) 5-325 mg per tablet Take 1 tablet by mouth every 4 to 6 hours as needed.      hydrocortisone 2.5 % ointment as needed.      KENALOG 40 mg/mL injection       ketoconazole (NIZORAL) 2 % cream APPLY BETWEEN TOES TWICE DAILY X 2 WKS      methylPREDNISolone (MEDROL DOSEPACK) 4 mg tablet use as directed 1 each 0    montelukast (SINGULAIR) 10 mg tablet Take 1 tablet (10 mg total) by mouth every evening. 90 tablet 1    pantoprazole (PROTONIX) 40 MG tablet Take 1 tablet (40 mg total) by mouth once daily. 90 tablet 3    prednisolon/gatiflox/bromfenac (PREDNISOL ACE-GATIFLOX-BROMFEN) 1-0.5-0.075 % DrpS Apply 1 drop to eye 3 (three) times daily. in operative eye for 1 month after surgery 5 mL 3    promethazine (PHENERGAN) 25 MG tablet TAKE 1 TABLET BY MOUTH EVERY 4 TO 6 HOURS AS NEEDED FOR NAUSEA AND VOMITING      QUICKVUE AT-HOME COVID-19 TEST Kit use as directed      aspirin (ECOTRIN) 81 MG EC tablet Take 1 tablet (81 mg total) by mouth once daily.  0     Current Facility-Administered Medications on File Prior to Visit   Medication Dose Route Frequency Provider Last Rate Last Admin    sodium hyaluronate (EUFLEXXA) 10 mg/mL(mw 2.4 -3.6 million) injection 20 mg  20 mg Intra-articular  Armani Cai PA-C   20 mg at 11/14/22 0830    [DISCONTINUED] sodium hyaluronate (orthovisc) 30 mg  30 mg Intra-articular  KAVITA Packer-C   30 mg at 11/28/22 0830         Review of patient's allergies indicates:  No Known Allergies      Review of Systems   Constitutional: Negative for chills, diaphoresis, fever, malaise/fatigue and night sweats.   Cardiovascular:  Negative for claudication, cyanosis, leg swelling and syncope.   Skin:  Negative for color change, dry skin, nail changes, rash, suspicious lesions and unusual hair distribution.   Musculoskeletal:  Negative for falls, gout, joint pain, joint swelling, muscle cramps, muscle weakness and stiffness.    Gastrointestinal:  Negative for constipation, diarrhea, nausea and vomiting.   Neurological:  Negative for brief paralysis, disturbances in coordination, focal weakness, numbness, paresthesias, sensory change and tremors.         Objective:      Physical Exam  Constitutional:       General: She is not in acute distress.     Appearance: She is well-developed. She is not diaphoretic.   Cardiovascular:      Pulses:           Popliteal pulses are 2+ on the right side and 2+ on the left side.        Dorsalis pedis pulses are 2+ on the right side and 2+ on the left side.        Posterior tibial pulses are 2+ on the right side and 2+ on the left side.      Comments: Capillary refill 3 seconds all toes/distal feet, all toes/both feet warm to touch.      Negative lymphadenopathy bilateral popliteal fossa and tarsal tunnel.      Negavie lower extremity edema bilateral.    Musculoskeletal:      Right ankle: No swelling, deformity, ecchymosis or lacerations. Normal range of motion. Normal pulse.      Right Achilles Tendon: Normal. No defects. Lopez's test negative.      Comments: right bunion with limited range of motion and tenderness to palpation.  Approximately 30-40 degrees loaded.      Compound digital deformities right and left right worse that are longstanding baseline for this patient post trauma and surgical repair many years ago.   Lymphadenopathy:      Lower Body: No right inguinal adenopathy. No left inguinal adenopathy.      Comments: Negative lymphadenopathy bilateral popliteal fossa and tarsal tunnel.    Negative lymphangitic streaking bilateral feet/ankles/legs.   Skin:     General: Skin is warm and dry.      Capillary Refill: Capillary refill takes 2 to 3 seconds.      Coloration: Skin is not pale.      Findings: No abrasion, bruising, burn, ecchymosis, erythema, laceration, lesion or rash.      Nails: There is no clubbing.      Comments:   Skin is normal age and health appropriate color, turgor, texture,  "and temperature bilateral lower extremities without ulceration, hyperpigmentation, discoloration, masses nodules or cords palpated.  No ecchymosis, erythema, edema, or cardinal signs of infection bilateral lower extremities.     Neurological:      Mental Status: She is alert and oriented to person, place, and time.      Sensory: No sensory deficit.      Motor: No tremor, atrophy or abnormal muscle tone.      Gait: Gait normal.      Comments: Negative tinel sign to percussion sural, superficial peroneal, deep peroneal, saphenous, and posterior tibial nerves right and left ankles and feet.    Negative allodynia both feet             Assessment:       Encounter Diagnoses   Name Primary?    Foot pain, right     Bunion Yes    Hammer toe of right foot     Dermatophytosis of nail     Corn or callus            Plan:       Jessica was seen today for foot problem.    Diagnoses and all orders for this visit:    Bunion  -     X-Ray Foot Complete Right; Future    Foot pain, right  -     X-Ray Foot Complete Right; Future    Hammer toe of right foot  -     X-Ray Foot Complete Right; Future    Dermatophytosis of nail    Corn or callus      I counseled the patient on her conditions, their implications and medical management.  Patient interested in surgical intervention. Xrays right foot. Referral to Dr. Dunaway for consideration.   Continue Physical therapy   Continue shoe and activity modification as needed for relief in the meantime.   Discussed shoes and custom foot orthotics.  Patient may consider custom foot orthotics after surgery if she does proceed.      Separately, nails and calluses trimmed per procedure brief:     Patient requesting "routine foot care". Patient is not a high risk foot care patient. Patient understands this is not typically a covered service and patient is aware of responsibility of payment. Pedal pulses are palpable. No known risk factors requiring routine foot care.  Nails   were reduced and trimmed " bilaterally. Patient tolerated well.

## 2022-11-30 ENCOUNTER — TELEPHONE (OUTPATIENT)
Dept: SPORTS MEDICINE | Facility: CLINIC | Age: 72
End: 2022-11-30
Payer: MEDICARE

## 2022-11-30 NOTE — TELEPHONE ENCOUNTER
----- Message from Lisa Urrutia sent at 11/30/2022  9:50 AM CST -----  Contact: pt  Pt calling to get her injection appt moved up to 8:30 if possible, please call       Confirmed patient's contact info below:  Contact Name: Jessica Floyd  Phone Number: [phone]

## 2022-12-05 ENCOUNTER — OFFICE VISIT (OUTPATIENT)
Dept: SPORTS MEDICINE | Facility: CLINIC | Age: 72
End: 2022-12-05
Payer: MEDICARE

## 2022-12-05 ENCOUNTER — APPOINTMENT (OUTPATIENT)
Dept: RADIOLOGY | Facility: OTHER | Age: 72
End: 2022-12-05
Attending: PODIATRIST
Payer: MEDICARE

## 2022-12-05 VITALS — WEIGHT: 147 LBS | BODY MASS INDEX: 25.1 KG/M2 | HEIGHT: 64 IN

## 2022-12-05 DIAGNOSIS — M21.619 BUNION: ICD-10-CM

## 2022-12-05 DIAGNOSIS — M20.41 HAMMER TOE OF RIGHT FOOT: ICD-10-CM

## 2022-12-05 DIAGNOSIS — M17.12 PRIMARY OSTEOARTHRITIS OF LEFT KNEE: Primary | ICD-10-CM

## 2022-12-05 DIAGNOSIS — M79.671 FOOT PAIN, RIGHT: ICD-10-CM

## 2022-12-05 PROCEDURE — 20610 LARGE JOINT ASPIRATION/INJECTION: L KNEE: ICD-10-PCS | Mod: LT,S$GLB,, | Performed by: PHYSICIAN ASSISTANT

## 2022-12-05 PROCEDURE — 73630 X-RAY EXAM OF FOOT: CPT | Mod: 26,RT,, | Performed by: RADIOLOGY

## 2022-12-05 PROCEDURE — 99999 PR PBB SHADOW E&M-EST. PATIENT-LVL III: ICD-10-PCS | Mod: PBBFAC,,, | Performed by: PHYSICIAN ASSISTANT

## 2022-12-05 PROCEDURE — 99499 UNLISTED E&M SERVICE: CPT | Mod: S$GLB,,, | Performed by: PHYSICIAN ASSISTANT

## 2022-12-05 PROCEDURE — 3044F PR MOST RECENT HEMOGLOBIN A1C LEVEL <7.0%: ICD-10-PCS | Mod: CPTII,S$GLB,, | Performed by: PHYSICIAN ASSISTANT

## 2022-12-05 PROCEDURE — 73630 XR FOOT COMPLETE 3 VIEW RIGHT: ICD-10-PCS | Mod: 26,RT,, | Performed by: RADIOLOGY

## 2022-12-05 PROCEDURE — 1126F AMNT PAIN NOTED NONE PRSNT: CPT | Mod: CPTII,S$GLB,, | Performed by: PHYSICIAN ASSISTANT

## 2022-12-05 PROCEDURE — 1159F PR MEDICATION LIST DOCUMENTED IN MEDICAL RECORD: ICD-10-PCS | Mod: CPTII,S$GLB,, | Performed by: PHYSICIAN ASSISTANT

## 2022-12-05 PROCEDURE — 3008F PR BODY MASS INDEX (BMI) DOCUMENTED: ICD-10-PCS | Mod: CPTII,S$GLB,, | Performed by: PHYSICIAN ASSISTANT

## 2022-12-05 PROCEDURE — 73630 X-RAY EXAM OF FOOT: CPT | Mod: TC,RT

## 2022-12-05 PROCEDURE — 3044F HG A1C LEVEL LT 7.0%: CPT | Mod: CPTII,S$GLB,, | Performed by: PHYSICIAN ASSISTANT

## 2022-12-05 PROCEDURE — 99499 NO LOS: ICD-10-PCS | Mod: S$GLB,,, | Performed by: PHYSICIAN ASSISTANT

## 2022-12-05 PROCEDURE — 1126F PR PAIN SEVERITY QUANTIFIED, NO PAIN PRESENT: ICD-10-PCS | Mod: CPTII,S$GLB,, | Performed by: PHYSICIAN ASSISTANT

## 2022-12-05 PROCEDURE — 3008F BODY MASS INDEX DOCD: CPT | Mod: CPTII,S$GLB,, | Performed by: PHYSICIAN ASSISTANT

## 2022-12-05 PROCEDURE — 20610 DRAIN/INJ JOINT/BURSA W/O US: CPT | Mod: LT,S$GLB,, | Performed by: PHYSICIAN ASSISTANT

## 2022-12-05 PROCEDURE — 1159F MED LIST DOCD IN RCRD: CPT | Mod: CPTII,S$GLB,, | Performed by: PHYSICIAN ASSISTANT

## 2022-12-05 PROCEDURE — 99999 PR PBB SHADOW E&M-EST. PATIENT-LVL III: CPT | Mod: PBBFAC,,, | Performed by: PHYSICIAN ASSISTANT

## 2022-12-05 NOTE — PROGRESS NOTES
"Subjective:     CC: left knee pain/osteoarthritis    Jessica is a 72 y.o. female coming in today for their 3rd Orthovisc injection to the left knee.     Objective:     VITAL SIGNS: There were no vitals taken for this visit.     Orthovisc Injection Procedure #3     A time out was performed, including verification of patient ID, procedure, site and side, availability of information and equipment, review of safety issues, and agreement with consent, the procedure site was marked.    Location: Knee joint, left     Procedure: The patient was prepped aseptically with alcohol and chlorhexidine. Ethyl Chloride spray was used prior to skin puncture to help numb the superficial skin. After cold spray was applied, using a 22G, 1.5" needle 2cc of Orthovisc was injected into the knee joint. The patient was in the seated position during the duration of this procedure and the injection approach was from the anterolateral aspect.       Patient tolerance: The patient tolerated the procedure well with no immediate complications. There were no adverse reactions to the medication. Patient was instructed to apply ice to the joint for up to 20 minutes at a time and avoid strenuous activities for 24-36 hours following the injection. Following that time, the patient can resume activities as prior to the injection.     The patient was reminded to call the clinic immediately for any adverse side effects as explained in clinic today.     Orthovisc:  NDC: [address]-76  Product Code: [address]-39  Lot: [id number]  Exp: 12/31/2024      Assessment:      Encounter Diagnosis   Name Primary?    Primary osteoarthritis of left knee Yes            Plan:     1. Orthovisc injection of left knee received today (see procedure note above)  2. Follow-up in 6 weeks for repeat evaluation  3. Patient agreeable to today's plan and all questions were answered    Large Joint Aspiration/Injection: L knee    Date/Time: 12/5/2022 8:30 AM  Performed by: Armani CARPENTER" KEYSHA Cai  Authorized by: Armani Cai PA-C     Consent Done?:  Yes (Verbal)  Indications:  Arthritis  Site marked: the procedure site was marked    Timeout: prior to procedure the correct patient, procedure, and site was verified    Prep: patient was prepped and draped in usual sterile fashion      Local anesthesia used?: Yes    Local anesthetic:  Co-phenylcaine spray    Details:  Needle Size:  22 G  Ultrasonic Guidance for needle placement?: No    Approach:  Anterolateral  Location:  Knee  Site:  L knee  Medications:  30 mg sodium hyaluronate (orthovisc) 30 mg/2 mL  Patient tolerance:  Patient tolerated the procedure well with no immediate complications    .    This note is dictated using the [employer] Fluency Direct word recognition program. There are word recognition mistakes that are occasionally missed on review.

## 2022-12-06 ENCOUNTER — OFFICE VISIT (OUTPATIENT)
Dept: OPHTHALMOLOGY | Facility: CLINIC | Age: 72
End: 2022-12-06
Attending: OPHTHALMOLOGY
Payer: MEDICARE

## 2022-12-06 DIAGNOSIS — Z98.890 POST-OPERATIVE STATE: Primary | ICD-10-CM

## 2022-12-06 PROCEDURE — 99024 PR POST-OP FOLLOW-UP VISIT: ICD-10-PCS | Mod: S$GLB,,, | Performed by: OPHTHALMOLOGY

## 2022-12-06 PROCEDURE — 99024 POSTOP FOLLOW-UP VISIT: CPT | Mod: S$GLB,,, | Performed by: OPHTHALMOLOGY

## 2022-12-06 PROCEDURE — 99999 PR PBB SHADOW E&M-EST. PATIENT-LVL III: CPT | Mod: PBBFAC,,, | Performed by: OPHTHALMOLOGY

## 2022-12-06 PROCEDURE — 3044F HG A1C LEVEL LT 7.0%: CPT | Mod: CPTII,S$GLB,, | Performed by: OPHTHALMOLOGY

## 2022-12-06 PROCEDURE — 1160F RVW MEDS BY RX/DR IN RCRD: CPT | Mod: CPTII,S$GLB,, | Performed by: OPHTHALMOLOGY

## 2022-12-06 PROCEDURE — 1160F PR REVIEW ALL MEDS BY PRESCRIBER/CLIN PHARMACIST DOCUMENTED: ICD-10-PCS | Mod: CPTII,S$GLB,, | Performed by: OPHTHALMOLOGY

## 2022-12-06 PROCEDURE — 1126F AMNT PAIN NOTED NONE PRSNT: CPT | Mod: CPTII,S$GLB,, | Performed by: OPHTHALMOLOGY

## 2022-12-06 PROCEDURE — 1101F PT FALLS ASSESS-DOCD LE1/YR: CPT | Mod: CPTII,S$GLB,, | Performed by: OPHTHALMOLOGY

## 2022-12-06 PROCEDURE — 99999 PR PBB SHADOW E&M-EST. PATIENT-LVL III: ICD-10-PCS | Mod: PBBFAC,,, | Performed by: OPHTHALMOLOGY

## 2022-12-06 PROCEDURE — 3044F PR MOST RECENT HEMOGLOBIN A1C LEVEL <7.0%: ICD-10-PCS | Mod: CPTII,S$GLB,, | Performed by: OPHTHALMOLOGY

## 2022-12-06 PROCEDURE — 1159F MED LIST DOCD IN RCRD: CPT | Mod: CPTII,S$GLB,, | Performed by: OPHTHALMOLOGY

## 2022-12-06 PROCEDURE — 1126F PR PAIN SEVERITY QUANTIFIED, NO PAIN PRESENT: ICD-10-PCS | Mod: CPTII,S$GLB,, | Performed by: OPHTHALMOLOGY

## 2022-12-06 PROCEDURE — 1159F PR MEDICATION LIST DOCUMENTED IN MEDICAL RECORD: ICD-10-PCS | Mod: CPTII,S$GLB,, | Performed by: OPHTHALMOLOGY

## 2022-12-06 PROCEDURE — 3288F FALL RISK ASSESSMENT DOCD: CPT | Mod: CPTII,S$GLB,, | Performed by: OPHTHALMOLOGY

## 2022-12-06 PROCEDURE — 1101F PR PT FALLS ASSESS DOC 0-1 FALLS W/OUT INJ PAST YR: ICD-10-PCS | Mod: CPTII,S$GLB,, | Performed by: OPHTHALMOLOGY

## 2022-12-06 PROCEDURE — 3288F PR FALLS RISK ASSESSMENT DOCUMENTED: ICD-10-PCS | Mod: CPTII,S$GLB,, | Performed by: OPHTHALMOLOGY

## 2022-12-07 ENCOUNTER — OFFICE VISIT (OUTPATIENT)
Dept: PODIATRY | Facility: CLINIC | Age: 72
End: 2022-12-07
Payer: MEDICARE

## 2022-12-07 ENCOUNTER — TELEPHONE (OUTPATIENT)
Dept: PODIATRY | Facility: CLINIC | Age: 72
End: 2022-12-07

## 2022-12-07 VITALS
HEART RATE: 82 BPM | WEIGHT: 147 LBS | BODY MASS INDEX: 25.1 KG/M2 | HEIGHT: 64 IN | SYSTOLIC BLOOD PRESSURE: 137 MMHG | DIASTOLIC BLOOD PRESSURE: 69 MMHG

## 2022-12-07 DIAGNOSIS — M79.671 FOOT PAIN, RIGHT: Primary | ICD-10-CM

## 2022-12-07 DIAGNOSIS — M21.619 BUNION: ICD-10-CM

## 2022-12-07 DIAGNOSIS — M20.41 HAMMER TOE OF RIGHT FOOT: ICD-10-CM

## 2022-12-07 PROCEDURE — 1159F MED LIST DOCD IN RCRD: CPT | Mod: CPTII,S$GLB,, | Performed by: PODIATRIST

## 2022-12-07 PROCEDURE — 3075F PR MOST RECENT SYSTOLIC BLOOD PRESS GE 130-139MM HG: ICD-10-PCS | Mod: CPTII,S$GLB,, | Performed by: PODIATRIST

## 2022-12-07 PROCEDURE — 99213 PR OFFICE/OUTPT VISIT, EST, LEVL III, 20-29 MIN: ICD-10-PCS | Mod: S$GLB,,, | Performed by: PODIATRIST

## 2022-12-07 PROCEDURE — 3078F DIAST BP <80 MM HG: CPT | Mod: CPTII,S$GLB,, | Performed by: PODIATRIST

## 2022-12-07 PROCEDURE — 1101F PR PT FALLS ASSESS DOC 0-1 FALLS W/OUT INJ PAST YR: ICD-10-PCS | Mod: CPTII,S$GLB,, | Performed by: PODIATRIST

## 2022-12-07 PROCEDURE — 3288F FALL RISK ASSESSMENT DOCD: CPT | Mod: CPTII,S$GLB,, | Performed by: PODIATRIST

## 2022-12-07 PROCEDURE — 3075F SYST BP GE 130 - 139MM HG: CPT | Mod: CPTII,S$GLB,, | Performed by: PODIATRIST

## 2022-12-07 PROCEDURE — 1101F PT FALLS ASSESS-DOCD LE1/YR: CPT | Mod: CPTII,S$GLB,, | Performed by: PODIATRIST

## 2022-12-07 PROCEDURE — 1159F PR MEDICATION LIST DOCUMENTED IN MEDICAL RECORD: ICD-10-PCS | Mod: CPTII,S$GLB,, | Performed by: PODIATRIST

## 2022-12-07 PROCEDURE — 3078F PR MOST RECENT DIASTOLIC BLOOD PRESSURE < 80 MM HG: ICD-10-PCS | Mod: CPTII,S$GLB,, | Performed by: PODIATRIST

## 2022-12-07 PROCEDURE — 3008F PR BODY MASS INDEX (BMI) DOCUMENTED: ICD-10-PCS | Mod: CPTII,S$GLB,, | Performed by: PODIATRIST

## 2022-12-07 PROCEDURE — 99213 OFFICE O/P EST LOW 20 MIN: CPT | Mod: S$GLB,,, | Performed by: PODIATRIST

## 2022-12-07 PROCEDURE — 99999 PR PBB SHADOW E&M-EST. PATIENT-LVL V: CPT | Mod: PBBFAC,,, | Performed by: PODIATRIST

## 2022-12-07 PROCEDURE — 99999 PR PBB SHADOW E&M-EST. PATIENT-LVL V: ICD-10-PCS | Mod: PBBFAC,,, | Performed by: PODIATRIST

## 2022-12-07 PROCEDURE — 3288F PR FALLS RISK ASSESSMENT DOCUMENTED: ICD-10-PCS | Mod: CPTII,S$GLB,, | Performed by: PODIATRIST

## 2022-12-07 PROCEDURE — 3044F PR MOST RECENT HEMOGLOBIN A1C LEVEL <7.0%: ICD-10-PCS | Mod: CPTII,S$GLB,, | Performed by: PODIATRIST

## 2022-12-07 PROCEDURE — 1160F RVW MEDS BY RX/DR IN RCRD: CPT | Mod: CPTII,S$GLB,, | Performed by: PODIATRIST

## 2022-12-07 PROCEDURE — 3008F BODY MASS INDEX DOCD: CPT | Mod: CPTII,S$GLB,, | Performed by: PODIATRIST

## 2022-12-07 PROCEDURE — 1160F PR REVIEW ALL MEDS BY PRESCRIBER/CLIN PHARMACIST DOCUMENTED: ICD-10-PCS | Mod: CPTII,S$GLB,, | Performed by: PODIATRIST

## 2022-12-07 PROCEDURE — 3044F HG A1C LEVEL LT 7.0%: CPT | Mod: CPTII,S$GLB,, | Performed by: PODIATRIST

## 2022-12-07 NOTE — PROGRESS NOTES
Subjective:      Patient ID: Jessica Floyd is a 72 y.o. female.    Chief Complaint: Bunions    Deep achiing throbbing pain right big toe joint and plantar forefoot right.  Gradual onset, some improvement past several months, aggravated by increased weight bearing, shoe gear, pressure.  Stretches, inserts, athletic shoes, lidocaine gel help some.    Review of Systems   Constitutional: Negative for chills, diaphoresis, fever, malaise/fatigue and night sweats.   Cardiovascular:  Negative for claudication, cyanosis, leg swelling and syncope.   Skin:  Negative for color change, dry skin, nail changes, rash, suspicious lesions and unusual hair distribution.   Musculoskeletal:  Negative for falls, gout, joint pain, joint swelling, muscle cramps, muscle weakness and stiffness.   Gastrointestinal:  Negative for constipation, diarrhea, nausea and vomiting.   Neurological:  Negative for brief paralysis, disturbances in coordination, focal weakness, numbness, paresthesias, sensory change and tremors.         Objective:      Physical Exam  Constitutional:       General: She is not in acute distress.     Appearance: She is well-developed. She is not diaphoretic.   Cardiovascular:      Pulses:           Popliteal pulses are 2+ on the right side and 2+ on the left side.        Dorsalis pedis pulses are 2+ on the right side and 2+ on the left side.        Posterior tibial pulses are 2+ on the right side and 2+ on the left side.      Comments: Capillary refill 3 seconds all toes/distal feet, all toes/both feet warm to touch.      Negative lymphadenopathy bilateral popliteal fossa and tarsal tunnel.      Negavie lower extremity edema bilateral.    Musculoskeletal:      Right ankle: No swelling, deformity, ecchymosis or lacerations. Normal range of motion. Normal pulse.      Right Achilles Tendon: Normal. No defects. Lopez's test negative.      Comments: Mild ttp medial 1st mtpj with reduced range of motion right.    Pain to  palpation inferior mtpj 1-4 right without evidence of trauma or infection.      Compound digital deformities right and left right worse that are longstanding baseline for this patient post trauma and surgical repair many years ago.   Lymphadenopathy:      Lower Body: No right inguinal adenopathy. No left inguinal adenopathy.      Comments: Negative lymphadenopathy bilateral popliteal fossa and tarsal tunnel.    Negative lymphangitic streaking bilateral feet/ankles/legs.   Skin:     General: Skin is warm and dry.      Capillary Refill: Capillary refill takes 2 to 3 seconds.      Coloration: Skin is not pale.      Findings: No abrasion, bruising, burn, ecchymosis, erythema, laceration, lesion or rash.      Nails: There is no clubbing.      Comments: Skin thin, shiny, atrophic, with decreased density and distribution of pedal hair bilateral, but without hyperpigmentation, kirti discoloration,  ulcers, masses, nodules or cords palpated bilateral feet and legs.    Neurological:      Mental Status: She is alert and oriented to person, place, and time.      Sensory: No sensory deficit.      Motor: No tremor, atrophy or abnormal muscle tone.      Gait: Gait normal.      Deep Tendon Reflexes:      Reflex Scores:       Patellar reflexes are 2+ on the right side and 2+ on the left side.       Achilles reflexes are 2+ on the right side and 2+ on the left side.     Comments: Negative tinel sign to percussion sural, superficial peroneal, deep peroneal, saphenous, and posterior tibial nerves right and left ankles and feet.    Negative allodynia both feet   Psychiatric:         Behavior: Behavior is cooperative.           Assessment:       Encounter Diagnoses   Name Primary?    Foot pain, right Yes    Bunion     Hammer toe of right foot          Plan:       Jessica was seen today for bunions.    Diagnoses and all orders for this visit:    Foot pain, right  -     Ambulatory referral/consult to Rheumatology; Future  -     ORTHOTIC  DEVICE (DME)  -     Ambulatory referral/consult to Physical/Occupational Therapy; Future    Bunion  -     Ambulatory referral/consult to Rheumatology; Future  -     ORTHOTIC DEVICE (DME)  -     Ambulatory referral/consult to Physical/Occupational Therapy; Future    Hammer toe of right foot  -     Ambulatory referral/consult to Rheumatology; Future  -     ORTHOTIC DEVICE (DME)  -     Ambulatory referral/consult to Physical/Occupational Therapy; Future      I counseled the patient on her conditions, their implications and medical management.        Continue stretches, inserts, athletic shoes.    Rx custom orthtoics, PT, rheumatology consult - osteoarthritis patient request           No follow-ups on file.

## 2022-12-21 ENCOUNTER — OFFICE VISIT (OUTPATIENT)
Dept: OPTOMETRY | Facility: CLINIC | Age: 72
End: 2022-12-21
Payer: MEDICARE

## 2022-12-21 DIAGNOSIS — Z98.42 STATUS POST CATARACT EXTRACTION OF BOTH EYES WITH INSERTION OF INTRAOCULAR LENS: Primary | ICD-10-CM

## 2022-12-21 DIAGNOSIS — Z98.41 STATUS POST CATARACT EXTRACTION OF BOTH EYES WITH INSERTION OF INTRAOCULAR LENS: Primary | ICD-10-CM

## 2022-12-21 DIAGNOSIS — Z96.1 STATUS POST CATARACT EXTRACTION OF BOTH EYES WITH INSERTION OF INTRAOCULAR LENS: Primary | ICD-10-CM

## 2022-12-21 PROCEDURE — 1101F PR PT FALLS ASSESS DOC 0-1 FALLS W/OUT INJ PAST YR: ICD-10-PCS | Mod: CPTII,S$GLB,, | Performed by: OPTOMETRIST

## 2022-12-21 PROCEDURE — 1126F PR PAIN SEVERITY QUANTIFIED, NO PAIN PRESENT: ICD-10-PCS | Mod: CPTII,S$GLB,, | Performed by: OPTOMETRIST

## 2022-12-21 PROCEDURE — 99999 PR PBB SHADOW E&M-EST. PATIENT-LVL III: CPT | Mod: PBBFAC,,, | Performed by: OPTOMETRIST

## 2022-12-21 PROCEDURE — 3044F PR MOST RECENT HEMOGLOBIN A1C LEVEL <7.0%: ICD-10-PCS | Mod: CPTII,S$GLB,, | Performed by: OPTOMETRIST

## 2022-12-21 PROCEDURE — 1159F MED LIST DOCD IN RCRD: CPT | Mod: CPTII,S$GLB,, | Performed by: OPTOMETRIST

## 2022-12-21 PROCEDURE — 99024 PR POST-OP FOLLOW-UP VISIT: ICD-10-PCS | Mod: S$GLB,,, | Performed by: OPTOMETRIST

## 2022-12-21 PROCEDURE — 92015 PR REFRACTION: ICD-10-PCS | Mod: S$GLB,,, | Performed by: OPTOMETRIST

## 2022-12-21 PROCEDURE — 1159F PR MEDICATION LIST DOCUMENTED IN MEDICAL RECORD: ICD-10-PCS | Mod: CPTII,S$GLB,, | Performed by: OPTOMETRIST

## 2022-12-21 PROCEDURE — 99024 POSTOP FOLLOW-UP VISIT: CPT | Mod: S$GLB,,, | Performed by: OPTOMETRIST

## 2022-12-21 PROCEDURE — 3044F HG A1C LEVEL LT 7.0%: CPT | Mod: CPTII,S$GLB,, | Performed by: OPTOMETRIST

## 2022-12-21 PROCEDURE — 1101F PT FALLS ASSESS-DOCD LE1/YR: CPT | Mod: CPTII,S$GLB,, | Performed by: OPTOMETRIST

## 2022-12-21 PROCEDURE — 99999 PR PBB SHADOW E&M-EST. PATIENT-LVL III: ICD-10-PCS | Mod: PBBFAC,,, | Performed by: OPTOMETRIST

## 2022-12-21 PROCEDURE — 92015 DETERMINE REFRACTIVE STATE: CPT | Mod: S$GLB,,, | Performed by: OPTOMETRIST

## 2022-12-21 PROCEDURE — 3288F FALL RISK ASSESSMENT DOCD: CPT | Mod: CPTII,S$GLB,, | Performed by: OPTOMETRIST

## 2022-12-21 PROCEDURE — 3288F PR FALLS RISK ASSESSMENT DOCUMENTED: ICD-10-PCS | Mod: CPTII,S$GLB,, | Performed by: OPTOMETRIST

## 2022-12-21 PROCEDURE — 1126F AMNT PAIN NOTED NONE PRSNT: CPT | Mod: CPTII,S$GLB,, | Performed by: OPTOMETRIST

## 2022-12-21 NOTE — PROGRESS NOTES
HPI    DLS by Dr. Cristina 12/6/2022  Patient here today for 1 mo post op phaco ou. VA improved, no pain or f/f.  Combo gtts tid OS  H/o:  Amblyopia OS since childhood   Cerebral aneurysm 12/1999   S/p Brain sx 12/1999   Minium Peripheral Vision OS  Last edited by Alyssia Read, OD on 12/21/2022  9:29 AM.            Assessment /Plan     For exam results, see Encounter Report.    Status post cataract extraction of both eyes with insertion of intraocular lens      Eyeglass Final Rx       Eyeglass Final Rx         Sphere Cylinder Axis    Right +2.25 +0.75 165    Left +2.25 +0.75 165      Type: NVO    Expiration Date: 12/21/2023                   Educated pt on today's WNL findings OU. OU healed from CAT Sx. OK to D/C post operative gtts.    RTC in 1 year for annual eye exam unless changes noted sooner.

## 2022-12-27 DIAGNOSIS — M25.511 RIGHT SHOULDER PAIN, UNSPECIFIED CHRONICITY: Primary | ICD-10-CM

## 2023-01-06 ENCOUNTER — TELEPHONE (OUTPATIENT)
Dept: SPORTS MEDICINE | Facility: CLINIC | Age: 73
End: 2023-01-06
Payer: MEDICARE

## 2023-01-06 NOTE — TELEPHONE ENCOUNTER
M for patient Jessica to come in at 8 to get x-ray first on 01/10/23 before she see Armani.  Any questions please call the office at [phone].

## 2023-01-10 ENCOUNTER — APPOINTMENT (OUTPATIENT)
Dept: RADIOLOGY | Facility: OTHER | Age: 73
End: 2023-01-10
Attending: PHYSICIAN ASSISTANT
Payer: MEDICARE

## 2023-01-10 ENCOUNTER — OFFICE VISIT (OUTPATIENT)
Dept: SPORTS MEDICINE | Facility: CLINIC | Age: 73
End: 2023-01-10
Payer: MEDICARE

## 2023-01-10 VITALS
BODY MASS INDEX: 23.41 KG/M2 | DIASTOLIC BLOOD PRESSURE: 77 MMHG | WEIGHT: 136.38 LBS | HEART RATE: 76 BPM | SYSTOLIC BLOOD PRESSURE: 140 MMHG

## 2023-01-10 DIAGNOSIS — M25.511 ACUTE PAIN OF RIGHT SHOULDER: Primary | ICD-10-CM

## 2023-01-10 DIAGNOSIS — M25.511 RIGHT SHOULDER PAIN, UNSPECIFIED CHRONICITY: ICD-10-CM

## 2023-01-10 PROCEDURE — 73030 X-RAY EXAM OF SHOULDER: CPT | Mod: TC,HCNC,RT

## 2023-01-10 PROCEDURE — 99999 PR PBB SHADOW E&M-EST. PATIENT-LVL III: CPT | Mod: PBBFAC,HCNC,, | Performed by: PHYSICIAN ASSISTANT

## 2023-01-10 PROCEDURE — 3078F DIAST BP <80 MM HG: CPT | Mod: HCNC,CPTII,S$GLB, | Performed by: PHYSICIAN ASSISTANT

## 2023-01-10 PROCEDURE — 1159F MED LIST DOCD IN RCRD: CPT | Mod: HCNC,CPTII,S$GLB, | Performed by: PHYSICIAN ASSISTANT

## 2023-01-10 PROCEDURE — 3077F SYST BP >= 140 MM HG: CPT | Mod: HCNC,CPTII,S$GLB, | Performed by: PHYSICIAN ASSISTANT

## 2023-01-10 PROCEDURE — 3077F PR MOST RECENT SYSTOLIC BLOOD PRESSURE >= 140 MM HG: ICD-10-PCS | Mod: HCNC,CPTII,S$GLB, | Performed by: PHYSICIAN ASSISTANT

## 2023-01-10 PROCEDURE — 73030 X-RAY EXAM OF SHOULDER: CPT | Mod: 26,HCNC,RT, | Performed by: RADIOLOGY

## 2023-01-10 PROCEDURE — 3288F PR FALLS RISK ASSESSMENT DOCUMENTED: ICD-10-PCS | Mod: HCNC,CPTII,S$GLB, | Performed by: PHYSICIAN ASSISTANT

## 2023-01-10 PROCEDURE — 3008F BODY MASS INDEX DOCD: CPT | Mod: HCNC,CPTII,S$GLB, | Performed by: PHYSICIAN ASSISTANT

## 2023-01-10 PROCEDURE — 1101F PR PT FALLS ASSESS DOC 0-1 FALLS W/OUT INJ PAST YR: ICD-10-PCS | Mod: HCNC,CPTII,S$GLB, | Performed by: PHYSICIAN ASSISTANT

## 2023-01-10 PROCEDURE — 1125F AMNT PAIN NOTED PAIN PRSNT: CPT | Mod: HCNC,CPTII,S$GLB, | Performed by: PHYSICIAN ASSISTANT

## 2023-01-10 PROCEDURE — 99999 PR PBB SHADOW E&M-EST. PATIENT-LVL III: ICD-10-PCS | Mod: PBBFAC,HCNC,, | Performed by: PHYSICIAN ASSISTANT

## 2023-01-10 PROCEDURE — 1160F RVW MEDS BY RX/DR IN RCRD: CPT | Mod: HCNC,CPTII,S$GLB, | Performed by: PHYSICIAN ASSISTANT

## 2023-01-10 PROCEDURE — 99213 OFFICE O/P EST LOW 20 MIN: CPT | Mod: HCNC,S$GLB,, | Performed by: PHYSICIAN ASSISTANT

## 2023-01-10 PROCEDURE — 1159F PR MEDICATION LIST DOCUMENTED IN MEDICAL RECORD: ICD-10-PCS | Mod: HCNC,CPTII,S$GLB, | Performed by: PHYSICIAN ASSISTANT

## 2023-01-10 PROCEDURE — 73030 XR SHOULDER COMPLETE 2 OR MORE VIEWS RIGHT: ICD-10-PCS | Mod: 26,HCNC,RT, | Performed by: RADIOLOGY

## 2023-01-10 PROCEDURE — 3288F FALL RISK ASSESSMENT DOCD: CPT | Mod: HCNC,CPTII,S$GLB, | Performed by: PHYSICIAN ASSISTANT

## 2023-01-10 PROCEDURE — 3078F PR MOST RECENT DIASTOLIC BLOOD PRESSURE < 80 MM HG: ICD-10-PCS | Mod: HCNC,CPTII,S$GLB, | Performed by: PHYSICIAN ASSISTANT

## 2023-01-10 PROCEDURE — 1160F PR REVIEW ALL MEDS BY PRESCRIBER/CLIN PHARMACIST DOCUMENTED: ICD-10-PCS | Mod: HCNC,CPTII,S$GLB, | Performed by: PHYSICIAN ASSISTANT

## 2023-01-10 PROCEDURE — 1101F PT FALLS ASSESS-DOCD LE1/YR: CPT | Mod: HCNC,CPTII,S$GLB, | Performed by: PHYSICIAN ASSISTANT

## 2023-01-10 PROCEDURE — 99213 PR OFFICE/OUTPT VISIT, EST, LEVL III, 20-29 MIN: ICD-10-PCS | Mod: HCNC,S$GLB,, | Performed by: PHYSICIAN ASSISTANT

## 2023-01-10 PROCEDURE — 3008F PR BODY MASS INDEX (BMI) DOCUMENTED: ICD-10-PCS | Mod: HCNC,CPTII,S$GLB, | Performed by: PHYSICIAN ASSISTANT

## 2023-01-10 PROCEDURE — 1125F PR PAIN SEVERITY QUANTIFIED, PAIN PRESENT: ICD-10-PCS | Mod: HCNC,CPTII,S$GLB, | Performed by: PHYSICIAN ASSISTANT

## 2023-01-10 RX ORDER — METHYLPREDNISOLONE 4 MG/1
TABLET ORAL
Qty: 21 EACH | Refills: 0 | Status: SHIPPED | OUTPATIENT
Start: 2023-01-10 | End: 2023-01-25

## 2023-01-10 NOTE — PROGRESS NOTES
ESTABLISHED PATIENT - NEW PROBLEM    HISTORY OF PRESENT ILLNESS   72 y.o. Female with a history of right shoulder pain who began having pain 1 week ago.  She denies having any precipitating injury or trauma but states that she was moving a lot of boxes which may have irritated her shoulder.  She is having a lot of discomfort with movement and at night while sleeping.  She has had difficulty getting dressed due to the pain and is frequently dropping objects due to weakness.        + mechanical symptoms, - instability    Is affecting ADLs.  Pain is 10/10 at it's worst.        PAST MEDICAL HISTORY    Past Medical History:   Diagnosis Date    Allergic rhinitis     Amblyopia     Carotid stenosis     Coronary atherosclerosis     Outside [facility] 2014: 20% mLAD, 50% mCx, 20%, mRCA    Dyslipidemia     ESBL (extended spectrum beta-lactamase) producing bacteria infection     UTI    Hypertension     Nausea and vomiting 2017    Subarachnoid hemorrhage due to ruptured aneurysm        PAST SURGICAL HISTORY     Past Surgical History:   Procedure Laterality Date    ADENOIDECTOMY      ANTERIOR CRUCIATE LIGAMENT REPAIR      APPENDECTOMY      CATARACT EXTRACTION W/  INTRAOCULAR LENS IMPLANT Right 2022    Procedure: EXTRACTION, CATARACT, WITH IOL INSERTION;  Surgeon: Higinio Cristina MD;  Location: [facility] OR;  Service: Ophthalmology;  Laterality: Right;    CATARACT EXTRACTION W/  INTRAOCULAR LENS IMPLANT Left 2022    Procedure: EXTRACTION, CATARACT, WITH IOL INSERTION;  Surgeon: Higinio Cristina MD;  Location: [facility];  Service: Ophthalmology;  Laterality: Left;    CEREBRAL ANEURYSM REPAIR      clip     SECTION      DENTAL SURGERY      TONSILLECTOMY         FAMILY HISTORY    Family History   Problem Relation Age of Onset    Hypertension Mother     Aneurysm Mother     Hypertension Father     Alcohol abuse Father     Kidney disease Brother     Heart disease Brother     Gout Brother     No Known Problems  Daughter     No Known Problems Daughter     No Known Problems Daughter        SOCIAL HISTORY    Social History     Socioeconomic History    Marital status:    Occupational History    Occupation: Retired   Tobacco Use    Smoking status: Former     Packs/day: 0.50     Years: 20.00     Pack years: 10.00     Types: Cigarettes     Quit date: 1999     Years since quittin.0    Smokeless tobacco: Never   Substance and Sexual Activity    Alcohol use: Yes     Alcohol/week: 7.0 standard drinks     Types: 7 Glasses of wine per week    Drug use: No    Sexual activity: Yes     Partners: Male   Social History Narrative    .  Has 3 grown daughters.   lives in [facility].       Social Determinants of Health     Financial Resource Strain: Low Risk     Difficulty of Paying Living Expenses: Not hard at all   Food Insecurity: No Food Insecurity    Worried About Running Out of Food in the Last Year: Never true    Ran Out of Food in the Last Year: Never true   Transportation Needs: Unmet Transportation Needs    Lack of Transportation (Medical): Yes    Lack of Transportation (Non-Medical): Yes   Physical Activity: Inactive    Days of Exercise per Week: 0 days    Minutes of Exercise per Session: 0 min   Stress: Stress Concern Present    Feeling of Stress : To some extent   Social Connections: Moderately Integrated    Frequency of Communication with Friends and Family: More than three times a week    Frequency of Social Gatherings with Friends and Family: More than three times a week    Attends Confucianism Services: More than 4 times per year    Active Member of Clubs or Organizations: No    Attends Club or Organization Meetings: Never    Marital Status:    Housing Stability: Unknown    Unable to Pay for Housing in the Last Year: No    Unstable Housing in the Last Year: No       MEDICATIONS      Current Outpatient Medications:     allopurinoL (ZYLOPRIM) 100 MG tablet, Take 0.5 tablets (50 mg total) by mouth  once daily., Disp: 30 tablet, Rfl: 11    amLODIPine (NORVASC) 10 MG tablet, TAKE 1 TABLET BY MOUTH EVERY DAY, Disp: 90 tablet, Rfl: 3    amoxicillin (AMOXIL) 500 MG capsule, TAKE 2 CAPSULES BY MOUTH NOW, THEN ONE CAPSULE THREE TIMES A DAY UNTIL COMPLETELY FINISHED, Disp: , Rfl:     atorvastatin (LIPITOR) 80 MG tablet, TAKE 1 TABLET BY MOUTH EVERY DAY, Disp: 90 tablet, Rfl: 3    carvediloL (COREG) 12.5 MG tablet, TAKE 1 TABLET BY MOUTH TWICE A DAY WITH MEALS, Disp: 180 tablet, Rfl: 3    colchicine (MITIGARE) 0.6 mg Cap, Take 1 capsule (0.6 mg total) by mouth once daily at 6am., Disp: 30 capsule, Rfl: 11    diazePAM (VALIUM) 10 MG Tab, TAKE 1 TABLET BY MOUTH 1 HOUR PRIOR TO APPT, Disp: , Rfl:     fluticasone propionate (FLONASE) 50 mcg/actuation nasal spray, SPRAY ONCE INTO EACH NOSTRIL ONCE DAILY, Disp: 16 mL, Rfl: 3    hydroCHLOROthiazide (HYDRODIURIL) 25 MG tablet, TAKE 1 TABLET BY MOUTH EVERY DAY, Disp: 90 tablet, Rfl: 3    hydrocortisone 2.5 % ointment, as needed., Disp: , Rfl:     ketoconazole (NIZORAL) 2 % cream, APPLY BETWEEN TOES TWICE DAILY X 2 WKS, Disp: , Rfl:     pantoprazole (PROTONIX) 40 MG tablet, Take 1 tablet (40 mg total) by mouth once daily., Disp: 90 tablet, Rfl: 3    prednisolon/gatiflox/bromfenac (PREDNISOL ACE-GATIFLOX-BROMFEN) 1-0.5-0.075 % DrpS, Apply 1 drop to eye 3 (three) times daily. in operative eye for 1 month after surgery, Disp: 5 mL, Rfl: 3    promethazine (PHENERGAN) 25 MG tablet, TAKE 1 TABLET BY MOUTH EVERY 4 TO 6 HOURS AS NEEDED FOR NAUSEA AND VOMITING, Disp: , Rfl:     aspirin (ECOTRIN) 81 MG EC tablet, Take 1 tablet (81 mg total) by mouth once daily., Disp: , Rfl: 0    methylPREDNISolone (MEDROL DOSEPACK) 4 mg tablet, use as directed, Disp: 21 each, Rfl: 0  No current facility-administered medications for this visit.    Facility-Administered Medications Ordered in Other Visits:     sodium hyaluronate (EUFLEXXA) 10 mg/mL(mw 2.4 -3.6 million) injection 20 mg, 20 mg,  "Intra-articular, , Armani Cai PA-C, 20 mg at 11/14/22 0830    ALLERGIES     Review of patient's allergies indicates:  No Known Allergies      REVIEW OF SYSTEMS   Constitution: Negative. Negative for chills, fever and night sweats.   HENT: Negative for congestion and headaches.    Eyes: Negative for blurred vision, left vision loss and right vision loss.   Cardiovascular: Negative for chest pain and syncope.   Respiratory: Negative for cough and shortness of breath.    Endocrine: Negative for polydipsia, polyphagia and polyuria.   Hematologic/Lymphatic: Negative for bleeding problem. Does not bruise/bleed easily.   Skin: Negative for dry skin, itching and rash.   Musculoskeletal: Negative for falls. Positive for right shoulder pain and weakness.  Gastrointestinal: Negative for abdominal pain and bowel incontinence.   Genitourinary: Negative for bladder incontinence and nocturia.   Neurological: Negative for disturbances in coordination, loss of balance and seizures.   Psychiatric/Behavioral: Negative for depression. The patient does not have insomnia.    Allergic/Immunologic: Negative for hives and persistent infections.     PHYSICAL EXAMINATION    Vitals: BP (!) 140/77   Pulse 76   Ht (P) 5' 4" (1.626 m)   Wt 61.8 kg (136 lb 5.7 oz)   BMI (P) 23.41 kg/m²     General: The patient appears active and healthy with no apparent physical problems.  No disturbance of mood or affect is demonstrated. Alert and Oriented.    HEENT: Eyes normal, pupils equally round, nose normal.    Resp: Equal and symmetrical chest rises. No wheezing    CV: Regular rate    Neck: Supple; nonpainful range of motion.    Extremities: no cyanosis, clubbing, edema, or diffuse swelling.  Palpable pulses, good capillary refill of the digits.  No coolness, discoloration, edema or obvious varicosities.    Skin: no lesions noted.    Lymphatic: no detected adenopathy in the upper or lower extremities.    Neurologic: normal mental status, normal " reflexes, normal gait and balance.  Patient is alert and oriented to person, place and time.  No flaccidity or spasticity is noted.  No motor or sensory deficits are noted.  Light touch is intact    Orthopaedic: Shoulder Musculoskeletal Exam    Inspection    Right      Right shoulder inspection is normal.      Ecchymosis: none      Symmetry: symmetric      Masses: none      Skin tenting: none      Prior incision: none    Palpation    Right      Crepitus: no crepitus      Increased warmth: none      Tenderness: present        Anterior shoulder: mild        Posterior shoulder: moderate        Clavicle: none        AC joint: none        Sternoclavicular joint: none        Rotator cuff: moderate        Greater tuberosity: moderate        Superior pole of scapula: none        Inferior pole of scapula: none        Bicipital groove: mild        Proximal biceps: mild    Range of Motion    Right      Active ROM: abnormal and pain.       Passive ROM: abnormal and pain.       Active forward elevation: 90.       Passive forward elevation: 130.       Shoulder active abduction: 60.       Passive abduction: 80.       Active external rotation at side: 20.       Passive external rotation at side: 30.       Active external rotation in abduction: 10.       Passive external rotation in abduction: 20.       Internal rotation: side.     Left      Internal rotation: T10.     Strength    Right      External rotation: 4/5. External rotation is affected by pain.       Internal rotation: 4+/5. Internal rotation is affected by pain.       Abduction: 4/5. Abduction is affected by pain.       Biceps: 5/5. Biceps are not affected by pain.       Triceps: 5/5. Triceps are not affected by pain.     Neurovascular    Right      Right shoulder nerve sensation is normal.    Scapula    Right      Right shoulder scapula is normal.    Special Tests    Right    Rotator Cuff Signs      Neer's test: positive       Bonilla test: positive       Supraspinatus:  positive       Belly press test: positive       Painful arc test: positive       Drop arm test: positive     Biceps/trini Signs      Kennebec's test: negative       Clicking/popping: negative     AC Joint Signs      Active horizontal adduction pain: negative     Instability Signs      Joint laxity: negative       IMAGING    X-ray Shoulder 2 or More Views Right  Narrative: EXAMINATION:  XR SHOULDER COMPLETE 2 OR MORE VIEWS RIGHT    CLINICAL HISTORY:  Pain in right shoulder    TECHNIQUE:  Two or three views of the right shoulder were performed.    COMPARISON:  None    FINDINGS:  No acute fracture or dislocation seen.  Calcifications along the insertion of the rotator cuff tendons which can be seen with calcific tendinitis.    No soft tissue edema or radiopaque retained foreign body.  Nonspecific calcifications in the soft tissues inferior to the glenohumeral joint.  Impression: No acute osseous abnormality seen.    Electronically signed by: Angela Ricci  Date:    01/10/2023  Time:    09:10        IMPRESSION       ICD-10-CM ICD-9-CM   1. Acute pain of right shoulder  M25.511 719.41       MEDICATIONS PRESCRIBED      Medrol Dosepack    RECOMMENDATIONS     Activity modification with icing frequently throughout the day as needed for pain  Medrol Dosepak by mouth as directed  Call our office in 1 week to give us an update on your symptoms.  If symptoms have not improved, a CT arthrogram of the right shoulder will be ordered for evaluation of the rotator cuff.  (Unable to have an MRI due to a metal clip in brain)      All questions were answered, pt will contact us for questions or concerns in the interim.

## 2023-01-23 ENCOUNTER — OFFICE VISIT (OUTPATIENT)
Dept: SPORTS MEDICINE | Facility: CLINIC | Age: 73
End: 2023-01-23
Payer: MEDICARE

## 2023-01-23 ENCOUNTER — PES CALL (OUTPATIENT)
Dept: ADMINISTRATIVE | Facility: CLINIC | Age: 73
End: 2023-01-23
Payer: MEDICARE

## 2023-01-23 VITALS
SYSTOLIC BLOOD PRESSURE: 161 MMHG | HEART RATE: 81 BPM | WEIGHT: 139 LBS | DIASTOLIC BLOOD PRESSURE: 75 MMHG | HEIGHT: 65 IN | BODY MASS INDEX: 23.16 KG/M2

## 2023-01-23 DIAGNOSIS — M25.511 ACUTE PAIN OF RIGHT SHOULDER: Primary | ICD-10-CM

## 2023-01-23 PROCEDURE — 99214 OFFICE O/P EST MOD 30 MIN: CPT | Mod: 25,HCNC,S$GLB, | Performed by: PHYSICIAN ASSISTANT

## 2023-01-23 PROCEDURE — 3288F FALL RISK ASSESSMENT DOCD: CPT | Mod: HCNC,CPTII,S$GLB, | Performed by: PHYSICIAN ASSISTANT

## 2023-01-23 PROCEDURE — 1125F PR PAIN SEVERITY QUANTIFIED, PAIN PRESENT: ICD-10-PCS | Mod: HCNC,CPTII,S$GLB, | Performed by: PHYSICIAN ASSISTANT

## 2023-01-23 PROCEDURE — 3288F PR FALLS RISK ASSESSMENT DOCUMENTED: ICD-10-PCS | Mod: HCNC,CPTII,S$GLB, | Performed by: PHYSICIAN ASSISTANT

## 2023-01-23 PROCEDURE — 20611 LARGE JOINT ASPIRATION/INJECTION: R SUBACROMIAL BURSA: ICD-10-PCS | Mod: HCNC,RT,S$GLB, | Performed by: PHYSICIAN ASSISTANT

## 2023-01-23 PROCEDURE — 1125F AMNT PAIN NOTED PAIN PRSNT: CPT | Mod: HCNC,CPTII,S$GLB, | Performed by: PHYSICIAN ASSISTANT

## 2023-01-23 PROCEDURE — 1101F PR PT FALLS ASSESS DOC 0-1 FALLS W/OUT INJ PAST YR: ICD-10-PCS | Mod: HCNC,CPTII,S$GLB, | Performed by: PHYSICIAN ASSISTANT

## 2023-01-23 PROCEDURE — 3077F PR MOST RECENT SYSTOLIC BLOOD PRESSURE >= 140 MM HG: ICD-10-PCS | Mod: HCNC,CPTII,S$GLB, | Performed by: PHYSICIAN ASSISTANT

## 2023-01-23 PROCEDURE — 20611 DRAIN/INJ JOINT/BURSA W/US: CPT | Mod: HCNC,RT,S$GLB, | Performed by: PHYSICIAN ASSISTANT

## 2023-01-23 PROCEDURE — 1159F MED LIST DOCD IN RCRD: CPT | Mod: HCNC,CPTII,S$GLB, | Performed by: PHYSICIAN ASSISTANT

## 2023-01-23 PROCEDURE — 99214 PR OFFICE/OUTPT VISIT, EST, LEVL IV, 30-39 MIN: ICD-10-PCS | Mod: 25,HCNC,S$GLB, | Performed by: PHYSICIAN ASSISTANT

## 2023-01-23 PROCEDURE — 99999 PR PBB SHADOW E&M-EST. PATIENT-LVL IV: CPT | Mod: PBBFAC,HCNC,, | Performed by: PHYSICIAN ASSISTANT

## 2023-01-23 PROCEDURE — 3078F PR MOST RECENT DIASTOLIC BLOOD PRESSURE < 80 MM HG: ICD-10-PCS | Mod: HCNC,CPTII,S$GLB, | Performed by: PHYSICIAN ASSISTANT

## 2023-01-23 PROCEDURE — 3078F DIAST BP <80 MM HG: CPT | Mod: HCNC,CPTII,S$GLB, | Performed by: PHYSICIAN ASSISTANT

## 2023-01-23 PROCEDURE — 99999 PR PBB SHADOW E&M-EST. PATIENT-LVL IV: ICD-10-PCS | Mod: PBBFAC,HCNC,, | Performed by: PHYSICIAN ASSISTANT

## 2023-01-23 PROCEDURE — 1159F PR MEDICATION LIST DOCUMENTED IN MEDICAL RECORD: ICD-10-PCS | Mod: HCNC,CPTII,S$GLB, | Performed by: PHYSICIAN ASSISTANT

## 2023-01-23 PROCEDURE — 3077F SYST BP >= 140 MM HG: CPT | Mod: HCNC,CPTII,S$GLB, | Performed by: PHYSICIAN ASSISTANT

## 2023-01-23 PROCEDURE — 1101F PT FALLS ASSESS-DOCD LE1/YR: CPT | Mod: HCNC,CPTII,S$GLB, | Performed by: PHYSICIAN ASSISTANT

## 2023-01-23 PROCEDURE — 3008F PR BODY MASS INDEX (BMI) DOCUMENTED: ICD-10-PCS | Mod: HCNC,CPTII,S$GLB, | Performed by: PHYSICIAN ASSISTANT

## 2023-01-23 PROCEDURE — 3008F BODY MASS INDEX DOCD: CPT | Mod: HCNC,CPTII,S$GLB, | Performed by: PHYSICIAN ASSISTANT

## 2023-01-23 RX ORDER — TRIAMCINOLONE ACETONIDE 40 MG/ML
40 INJECTION, SUSPENSION INTRA-ARTICULAR; INTRAMUSCULAR
Status: DISCONTINUED | OUTPATIENT
Start: 2023-01-23 | End: 2023-01-23 | Stop reason: HOSPADM

## 2023-01-23 RX ORDER — BUPIVACAINE HYDROCHLORIDE 2.5 MG/ML
2 INJECTION, SOLUTION INFILTRATION; PERINEURAL
Status: DISCONTINUED | OUTPATIENT
Start: 2023-01-23 | End: 2023-01-23 | Stop reason: HOSPADM

## 2023-01-23 RX ADMIN — BUPIVACAINE HYDROCHLORIDE 2 ML: 2.5 INJECTION, SOLUTION INFILTRATION; PERINEURAL at 10:01

## 2023-01-23 RX ADMIN — TRIAMCINOLONE ACETONIDE 40 MG: 40 INJECTION, SUSPENSION INTRA-ARTICULAR; INTRAMUSCULAR at 10:01

## 2023-01-23 NOTE — PROGRESS NOTES
ESTABLISHED PATIENT - NEW PROBLEM    History 01/23/2023:  Jessica returns here today for follow-up evaluation of her right shoulder.  She states that the Medrol Dosepak gave her very minimal relief.  She has had worsening pain and weakness since her last visit.  She is having a lot of difficulty getting dressed and sleeping at night.  She has been taking Tylenol p.m. at night which has helped her get some sleep.    Of note, she states that her knee pain has resolved following the Orthovisc series.    History 01/10/2023:  72 y.o. Female with a history of right shoulder pain who began having pain 1 week ago.  She denies having any precipitating injury or trauma but states that she was moving a lot of boxes which may have irritated her shoulder.  She is having a lot of discomfort with movement and at night while sleeping.  She has had difficulty getting dressed due to the pain and is frequently dropping objects due to weakness.        + mechanical symptoms, - instability    Is affecting ADLs.  Pain is 10/10 at it's worst.        PAST MEDICAL HISTORY    Past Medical History:   Diagnosis Date    Allergic rhinitis     Amblyopia     Carotid stenosis     Coronary atherosclerosis     Outside [facility] 6/2014: 20% mLAD, 50% mCx, 20%, mRCA    Dyslipidemia     ESBL (extended spectrum beta-lactamase) producing bacteria infection     UTI    Hypertension     Nausea and vomiting 05/26/2017    Subarachnoid hemorrhage due to ruptured aneurysm 1999       PAST SURGICAL HISTORY     Past Surgical History:   Procedure Laterality Date    ADENOIDECTOMY      ANTERIOR CRUCIATE LIGAMENT REPAIR  2012    APPENDECTOMY      CATARACT EXTRACTION W/  INTRAOCULAR LENS IMPLANT Right 11/7/2022    Procedure: EXTRACTION, CATARACT, WITH IOL INSERTION;  Surgeon: Higinio Cristina MD;  Location: [facility];  Service: Ophthalmology;  Laterality: Right;    CATARACT EXTRACTION W/  INTRAOCULAR LENS IMPLANT Left 11/21/2022    Procedure: EXTRACTION, CATARACT, WITH IOL  INSERTION;  Surgeon: Higinio Cristina MD;  Location: [facility];  Service: Ophthalmology;  Laterality: Left;    CEREBRAL ANEURYSM REPAIR      clip     SECTION      DENTAL SURGERY      TONSILLECTOMY         FAMILY HISTORY    Family History   Problem Relation Age of Onset    Hypertension Mother     Aneurysm Mother     Hypertension Father     Alcohol abuse Father     Kidney disease Brother     Heart disease Brother     Gout Brother     No Known Problems Daughter     No Known Problems Daughter     No Known Problems Daughter        SOCIAL HISTORY    Social History     Socioeconomic History    Marital status:    Occupational History    Occupation: Retired   Tobacco Use    Smoking status: Former     Packs/day: 0.50     Years: 20.00     Pack years: 10.00     Types: Cigarettes     Quit date: 1999     Years since quittin.0    Smokeless tobacco: Never   Substance and Sexual Activity    Alcohol use: Yes     Alcohol/week: 7.0 standard drinks     Types: 7 Glasses of wine per week    Drug use: No    Sexual activity: Yes     Partners: Male   Social History Narrative    .  Has 3 grown daughters.   lives in [facility].       Social Determinants of Health     Financial Resource Strain: Low Risk     Difficulty of Paying Living Expenses: Not hard at all   Food Insecurity: No Food Insecurity    Worried About Running Out of Food in the Last Year: Never true    Ran Out of Food in the Last Year: Never true   Transportation Needs: Unmet Transportation Needs    Lack of Transportation (Medical): Yes    Lack of Transportation (Non-Medical): Yes   Physical Activity: Inactive    Days of Exercise per Week: 0 days    Minutes of Exercise per Session: 0 min   Stress: Stress Concern Present    Feeling of Stress : To some extent   Social Connections: Moderately Integrated    Frequency of Communication with Friends and Family: More than three times a week    Frequency of Social Gatherings with Friends and Family: More  than three times a week    Attends [employer] Services: More than 4 times per year    Active Member of Clubs or Organizations: No    Attends Club or Organization Meetings: Never    Marital Status:    Housing Stability: Unknown    Unable to Pay for Housing in the Last Year: No    Unstable Housing in the Last Year: No       MEDICATIONS      Current Outpatient Medications:     allopurinoL (ZYLOPRIM) 100 MG tablet, Take 0.5 tablets (50 mg total) by mouth once daily., Disp: 30 tablet, Rfl: 11    amLODIPine (NORVASC) 10 MG tablet, TAKE 1 TABLET BY MOUTH EVERY DAY, Disp: 90 tablet, Rfl: 3    atorvastatin (LIPITOR) 80 MG tablet, TAKE 1 TABLET BY MOUTH EVERY DAY, Disp: 90 tablet, Rfl: 3    carvediloL (COREG) 12.5 MG tablet, TAKE 1 TABLET BY MOUTH TWICE A DAY WITH MEALS, Disp: 180 tablet, Rfl: 3    colchicine (MITIGARE) 0.6 mg Cap, Take 1 capsule (0.6 mg total) by mouth once daily at 6am., Disp: 30 capsule, Rfl: 11    fluticasone propionate (FLONASE) 50 mcg/actuation nasal spray, SPRAY ONCE INTO EACH NOSTRIL ONCE DAILY, Disp: 16 mL, Rfl: 3    hydroCHLOROthiazide (HYDRODIURIL) 25 MG tablet, TAKE 1 TABLET BY MOUTH EVERY DAY, Disp: 90 tablet, Rfl: 3    hydrocortisone 2.5 % ointment, as needed., Disp: , Rfl:     ketoconazole (NIZORAL) 2 % cream, APPLY BETWEEN TOES TWICE DAILY X 2 WKS, Disp: , Rfl:     pantoprazole (PROTONIX) 40 MG tablet, Take 1 tablet (40 mg total) by mouth once daily., Disp: 90 tablet, Rfl: 3    amoxicillin (AMOXIL) 500 MG capsule, TAKE 2 CAPSULES BY MOUTH NOW, THEN ONE CAPSULE THREE TIMES A DAY UNTIL COMPLETELY FINISHED, Disp: , Rfl:     aspirin (ECOTRIN) 81 MG EC tablet, Take 1 tablet (81 mg total) by mouth once daily., Disp: , Rfl: 0    diazePAM (VALIUM) 10 MG Tab, TAKE 1 TABLET BY MOUTH 1 HOUR PRIOR TO APPT, Disp: , Rfl:     methylPREDNISolone (MEDROL DOSEPACK) 4 mg tablet, use as directed, Disp: 21 each, Rfl: 0    prednisolon/gatiflox/bromfenac (PREDNISOL ACE-GATIFLOX-BROMFEN) 1-0.5-0.075 % DrpS,  "Apply 1 drop to eye 3 (three) times daily. in operative eye for 1 month after surgery, Disp: 5 mL, Rfl: 3    promethazine (PHENERGAN) 25 MG tablet, TAKE 1 TABLET BY MOUTH EVERY 4 TO 6 HOURS AS NEEDED FOR NAUSEA AND VOMITING, Disp: , Rfl:   No current facility-administered medications for this visit.    Facility-Administered Medications Ordered in Other Visits:     sodium hyaluronate (EUFLEXXA) 10 mg/mL(mw 2.4 -3.6 million) injection 20 mg, 20 mg, Intra-articular, , Armani Cai PA-C, 20 mg at 11/14/22 0830    ALLERGIES     Review of patient's allergies indicates:  No Known Allergies      REVIEW OF SYSTEMS   Constitution: Negative. Negative for chills, fever and night sweats.   HENT: Negative for congestion and headaches.    Eyes: Negative for blurred vision, left vision loss and right vision loss.   Cardiovascular: Negative for chest pain and syncope.   Respiratory: Negative for cough and shortness of breath.    Endocrine: Negative for polydipsia, polyphagia and polyuria.   Hematologic/Lymphatic: Negative for bleeding problem. Does not bruise/bleed easily.   Skin: Negative for dry skin, itching and rash.   Musculoskeletal: Negative for falls. Positive for right shoulder pain and weakness.  Gastrointestinal: Negative for abdominal pain and bowel incontinence.   Genitourinary: Negative for bladder incontinence and nocturia.   Neurological: Negative for disturbances in coordination, loss of balance and seizures.   Psychiatric/Behavioral: Negative for depression. The patient does not have insomnia.    Allergic/Immunologic: Negative for hives and persistent infections.     PHYSICAL EXAMINATION    Vitals: BP (!) 161/75   Pulse 81   Ht 5' 4.5" (1.638 m)   Wt 63 kg (139 lb)   BMI 23.49 kg/m²     General: The patient appears active and healthy with no apparent physical problems.  No disturbance of mood or affect is demonstrated. Alert and Oriented.    HEENT: Eyes normal, pupils equally round, nose normal.    Resp: " Equal and symmetrical chest rises. No wheezing    CV: Regular rate    Neck: Supple; nonpainful range of motion.    Extremities: no cyanosis, clubbing, edema, or diffuse swelling.  Palpable pulses, good capillary refill of the digits.  No coolness, discoloration, edema or obvious varicosities.    Skin: no lesions noted.    Lymphatic: no detected adenopathy in the upper or lower extremities.    Neurologic: normal mental status, normal reflexes, normal gait and balance.  Patient is alert and oriented to person, place and time.  No flaccidity or spasticity is noted.  No motor or sensory deficits are noted.  Light touch is intact    Orthopaedic: Shoulder Musculoskeletal Exam    Inspection    Right      Right shoulder inspection is normal.      Ecchymosis: none      Symmetry: symmetric      Masses: none      Skin tenting: none      Prior incision: none    Palpation    Right      Crepitus: no crepitus      Increased warmth: none      Tenderness: present        Anterior shoulder: mild        Posterior shoulder: moderate        Clavicle: none        AC joint: mild        Sternoclavicular joint: none        Rotator cuff: moderate        Greater tuberosity: moderate        Superior pole of scapula: none        Inferior pole of scapula: none        Bicipital groove: mild        Proximal biceps: mild    Range of Motion    Right      Active ROM: abnormal and pain.       Passive ROM: abnormal and pain.       Active forward elevation: 90.       Passive forward elevation: 130.       Shoulder active abduction: 60.       Passive abduction: 80.       Active external rotation at side: 20.       Passive external rotation at side: 30.       Active external rotation in abduction: 10.       Passive external rotation in abduction: 20.       Internal rotation: side.     Left      Internal rotation: T10.     Strength    Right      External rotation: 4/5. External rotation is affected by pain.       Internal rotation: 4+/5. Internal rotation is  affected by pain.       Abduction: 4/5. Abduction is affected by pain.       Biceps: 5/5. Biceps are not affected by pain.       Triceps: 5/5. Triceps are not affected by pain.     Neurovascular    Right      Right shoulder nerve sensation is normal.    Scapula    Right      Right shoulder scapula is normal.    Special Tests    Right    Rotator Cuff Signs      Neer's test: positive       Bonilla test: positive       Supraspinatus: positive       Belly press test: positive       Painful arc test: positive       Drop arm test: positive     Biceps/trini Signs      Eleanor's test: negative       Clicking/popping: negative     AC Joint Signs      Active horizontal adduction pain: negative     Instability Signs      Joint laxity: negative       IMAGING    X-ray Shoulder 2 or More Views Right  Narrative: EXAMINATION:  XR SHOULDER COMPLETE 2 OR MORE VIEWS RIGHT    CLINICAL HISTORY:  Pain in right shoulder    TECHNIQUE:  Two or three views of the right shoulder were performed.    COMPARISON:  None    FINDINGS:  No acute fracture or dislocation seen.  Calcifications along the insertion of the rotator cuff tendons which can be seen with calcific tendinitis.    No soft tissue edema or radiopaque retained foreign body.  Nonspecific calcifications in the soft tissues inferior to the glenohumeral joint.  Impression: No acute osseous abnormality seen.    Electronically signed by: Angela Ricci  Date:    01/10/2023  Time:    09:10        IMPRESSION       ICD-10-CM ICD-9-CM   1. Acute pain of right shoulder  M25.511 719.41         MEDICATIONS PRESCRIBED      Medrol Dosepack    RECOMMENDATIONS     Right shoulder subacromial corticosteroid injection was administered under ultrasound guidance  Return to clinic in 1 month for repeat evaluation; If symptoms have not improved, a CT arthrogram of the right shoulder will be ordered for evaluation of the rotator cuff  (Unable to have an MRI due to a metal clip in brain)    Large Joint  Aspiration/Injection: R subacromial bursa    Date/Time: 1/23/2023 10:30 AM  Performed by: Armani Cai PA-C  Authorized by: Armani Cai PA-C     Consent Done?:  Yes (Verbal)  Indications:  Pain and diagnostic evaluation  Site marked: the procedure site was marked    Timeout: prior to procedure the correct patient, procedure, and site was verified    Prep: patient was prepped and draped in usual sterile fashion      Local anesthesia used?: Yes    Local anesthetic:  Co-phenylcaine spray    Details:  Needle Size:  25 G  Ultrasonic Guidance for needle placement?: Yes (Ultrasound guidance was used for needle localization.  Images were saved and stored for documentation.  Dynamic visualization of the needle was continuous throughout the procedure and maintained accurate placement)    Images are saved and documented.  Approach:  Lateral  Location:  Shoulder  Site:  R subacromial bursa  Medications:  2 mL BUPivacaine 0.25% (2.5 mg/ml) 0.25 % (2.5 mg/mL); 40 mg triamcinolone acetonide 40 mg/mL  Medications comment:  5 mL LIDOcaine HCL 10 mg/ml (1%) 10 mg/mL (1 %)  Patient tolerance:  Patient tolerated the procedure well with no immediate complications      All questions were answered, pt will contact us for questions or concerns in the interim.

## 2023-01-25 ENCOUNTER — OFFICE VISIT (OUTPATIENT)
Dept: INTERNAL MEDICINE | Facility: CLINIC | Age: 73
End: 2023-01-25
Payer: MEDICARE

## 2023-01-25 VITALS
BODY MASS INDEX: 25.32 KG/M2 | WEIGHT: 137.56 LBS | SYSTOLIC BLOOD PRESSURE: 138 MMHG | OXYGEN SATURATION: 90 % | DIASTOLIC BLOOD PRESSURE: 70 MMHG | HEART RATE: 84 BPM | HEIGHT: 62 IN

## 2023-01-25 DIAGNOSIS — E78.5 DYSLIPIDEMIA: ICD-10-CM

## 2023-01-25 DIAGNOSIS — I60.9 SAH (SUBARACHNOID HEMORRHAGE): ICD-10-CM

## 2023-01-25 DIAGNOSIS — N18.31 STAGE 3A CHRONIC KIDNEY DISEASE: ICD-10-CM

## 2023-01-25 DIAGNOSIS — E05.90 SUBCLINICAL HYPERTHYROIDISM: ICD-10-CM

## 2023-01-25 DIAGNOSIS — Z00.00 ENCOUNTER FOR PREVENTIVE HEALTH EXAMINATION: Primary | ICD-10-CM

## 2023-01-25 DIAGNOSIS — I25.10 CORONARY ARTERY DISEASE INVOLVING NATIVE CORONARY ARTERY OF NATIVE HEART WITHOUT ANGINA PECTORIS: ICD-10-CM

## 2023-01-25 DIAGNOSIS — K21.9 GASTROESOPHAGEAL REFLUX DISEASE WITHOUT ESOPHAGITIS: ICD-10-CM

## 2023-01-25 DIAGNOSIS — J30.1 ALLERGIC RHINITIS DUE TO POLLEN, UNSPECIFIED SEASONALITY: ICD-10-CM

## 2023-01-25 DIAGNOSIS — J06.9 VIRAL UPPER RESPIRATORY TRACT INFECTION: ICD-10-CM

## 2023-01-25 DIAGNOSIS — I10 PRIMARY HYPERTENSION: ICD-10-CM

## 2023-01-25 DIAGNOSIS — I65.23 BILATERAL CAROTID ARTERY STENOSIS: ICD-10-CM

## 2023-01-25 DIAGNOSIS — H25.12 NUCLEAR SCLEROTIC CATARACT OF LEFT EYE: ICD-10-CM

## 2023-01-25 PROCEDURE — 3075F PR MOST RECENT SYSTOLIC BLOOD PRESS GE 130-139MM HG: ICD-10-PCS | Mod: HCNC,CPTII,S$GLB, | Performed by: NURSE PRACTITIONER

## 2023-01-25 PROCEDURE — 99999 PR PBB SHADOW E&M-EST. PATIENT-LVL IV: ICD-10-PCS | Mod: PBBFAC,HCNC,, | Performed by: NURSE PRACTITIONER

## 2023-01-25 PROCEDURE — 1101F PT FALLS ASSESS-DOCD LE1/YR: CPT | Mod: HCNC,CPTII,S$GLB, | Performed by: NURSE PRACTITIONER

## 2023-01-25 PROCEDURE — 1101F PR PT FALLS ASSESS DOC 0-1 FALLS W/OUT INJ PAST YR: ICD-10-PCS | Mod: HCNC,CPTII,S$GLB, | Performed by: NURSE PRACTITIONER

## 2023-01-25 PROCEDURE — G0439 PPPS, SUBSEQ VISIT: HCPCS | Mod: HCNC,S$GLB,, | Performed by: NURSE PRACTITIONER

## 2023-01-25 PROCEDURE — 99999 PR PBB SHADOW E&M-EST. PATIENT-LVL IV: CPT | Mod: PBBFAC,HCNC,, | Performed by: NURSE PRACTITIONER

## 2023-01-25 PROCEDURE — G0439 PR MEDICARE ANNUAL WELLNESS SUBSEQUENT VISIT: ICD-10-PCS | Mod: HCNC,S$GLB,, | Performed by: NURSE PRACTITIONER

## 2023-01-25 PROCEDURE — 1170F PR FUNCTIONAL STATUS ASSESSED: ICD-10-PCS | Mod: HCNC,CPTII,S$GLB, | Performed by: NURSE PRACTITIONER

## 2023-01-25 PROCEDURE — 3075F SYST BP GE 130 - 139MM HG: CPT | Mod: HCNC,CPTII,S$GLB, | Performed by: NURSE PRACTITIONER

## 2023-01-25 PROCEDURE — 3288F FALL RISK ASSESSMENT DOCD: CPT | Mod: HCNC,CPTII,S$GLB, | Performed by: NURSE PRACTITIONER

## 2023-01-25 PROCEDURE — 1160F PR REVIEW ALL MEDS BY PRESCRIBER/CLIN PHARMACIST DOCUMENTED: ICD-10-PCS | Mod: HCNC,CPTII,S$GLB, | Performed by: NURSE PRACTITIONER

## 2023-01-25 PROCEDURE — 3078F PR MOST RECENT DIASTOLIC BLOOD PRESSURE < 80 MM HG: ICD-10-PCS | Mod: HCNC,CPTII,S$GLB, | Performed by: NURSE PRACTITIONER

## 2023-01-25 PROCEDURE — 3078F DIAST BP <80 MM HG: CPT | Mod: HCNC,CPTII,S$GLB, | Performed by: NURSE PRACTITIONER

## 2023-01-25 PROCEDURE — 1159F MED LIST DOCD IN RCRD: CPT | Mod: HCNC,CPTII,S$GLB, | Performed by: NURSE PRACTITIONER

## 2023-01-25 PROCEDURE — 1125F PR PAIN SEVERITY QUANTIFIED, PAIN PRESENT: ICD-10-PCS | Mod: HCNC,CPTII,S$GLB, | Performed by: NURSE PRACTITIONER

## 2023-01-25 PROCEDURE — 1170F FXNL STATUS ASSESSED: CPT | Mod: HCNC,CPTII,S$GLB, | Performed by: NURSE PRACTITIONER

## 2023-01-25 PROCEDURE — 1125F AMNT PAIN NOTED PAIN PRSNT: CPT | Mod: HCNC,CPTII,S$GLB, | Performed by: NURSE PRACTITIONER

## 2023-01-25 PROCEDURE — 3008F BODY MASS INDEX DOCD: CPT | Mod: HCNC,CPTII,S$GLB, | Performed by: NURSE PRACTITIONER

## 2023-01-25 PROCEDURE — 1160F RVW MEDS BY RX/DR IN RCRD: CPT | Mod: HCNC,CPTII,S$GLB, | Performed by: NURSE PRACTITIONER

## 2023-01-25 PROCEDURE — 1159F PR MEDICATION LIST DOCUMENTED IN MEDICAL RECORD: ICD-10-PCS | Mod: HCNC,CPTII,S$GLB, | Performed by: NURSE PRACTITIONER

## 2023-01-25 PROCEDURE — 3288F PR FALLS RISK ASSESSMENT DOCUMENTED: ICD-10-PCS | Mod: HCNC,CPTII,S$GLB, | Performed by: NURSE PRACTITIONER

## 2023-01-25 PROCEDURE — 3008F PR BODY MASS INDEX (BMI) DOCUMENTED: ICD-10-PCS | Mod: HCNC,CPTII,S$GLB, | Performed by: NURSE PRACTITIONER

## 2023-01-25 NOTE — PATIENT INSTRUCTIONS
Counseling and Referral of Other Preventative  (Italic type indicates deductible and co-insurance are waived)    Patient Name: Jessica Floyd  Today's Date: 1/25/2023    Health Maintenance       Date Due Completion Date    Aspirin/Antiplatelet Therapy Never done ---    COVID-19 Vaccine (5 - Booster for Pfizer series) 06/15/2022 4/20/2022    High Dose Statin 01/23/2024 1/23/2023    Mammogram 06/15/2024 6/15/2022    Override on 2/9/2016: Done    DEXA Scan 06/15/2025 6/15/2022    Override on 2/9/2016: Done    Colorectal Cancer Screening 02/09/2026 2/9/2016 (Done)    Override on 2/9/2016: Done    Lipid Panel 04/05/2027 4/5/2022    Override on 6/7/2016: Done    TETANUS VACCINE 08/04/2027 8/4/2017        No orders of the defined types were placed in this encounter.    The following information is provided to all patients.  This information is to help you find resources for any of the problems found today that may be affecting your health:                Living healthy guide: www.[facility].[place].gov      Understanding Diabetes: www.diabetes.org      Eating healthy: www.cdc.gov/healthyweight      CDC home safety checklist: www.cdc.gov/steadi/patient.html      Agency on Aging: www.goea.[place].[facility]      Alcoholics anonymous (AA): www.aa.org      Physical Activity: www.cabrera.nih.gov/yi9tacu      Tobacco use: www.quitwithusla.org

## 2023-01-25 NOTE — PROGRESS NOTES
"  Jessica Floyd presented for a  Medicare AWV and comprehensive Health Risk Assessment today. The following components were reviewed and updated:    Medical history  Family History  Social history  Allergies and Current Medications  Health Risk Assessment  Health Maintenance  Care Team         ** See Completed Assessments for Annual Wellness Visit within the encounter summary.**         The following assessments were completed:  Living Situation  CAGE  Depression Screening  Timed Get Up and Go  Whisper Test  Cognitive Function Screening  Nutrition Screening  ADL Screening  PAQ Screening          Vitals:    01/25/23 0859   BP: 138/70   BP Location: Left arm   Patient Position: Sitting   Pulse: 84   SpO2: (!) 90%   Weight: 62.4 kg (137 lb 9.1 oz)   Height: 5' 2" (1.575 m)     Body mass index is 25.16 kg/m².    Physical Exam  Vitals reviewed.   Constitutional:       Appearance: She is well-developed.   HENT:      Head: Normocephalic and atraumatic. Not macrocephalic and not microcephalic. No raccoon eyes, Gee's sign, abrasion, contusion, right periorbital erythema, left periorbital erythema or laceration. Hair is normal.      Right Ear: No decreased hearing noted. No laceration, drainage, swelling or tenderness. No middle ear effusion. No foreign body. No mastoid tenderness. No hemotympanum. Tympanic membrane is not injected, scarred, perforated, erythematous, retracted or bulging. Tympanic membrane has normal mobility.      Left Ear: No decreased hearing noted. No laceration, drainage, swelling or tenderness.  No middle ear effusion. No foreign body. No mastoid tenderness. No hemotympanum. Tympanic membrane is not injected, scarred, perforated, erythematous, retracted or bulging. Tympanic membrane has normal mobility.      Nose: Nose normal. No nasal deformity, laceration or mucosal edema.      Mouth/Throat:      Pharynx: Uvula midline.   Eyes:      General: Lids are normal. No scleral icterus.     " Conjunctiva/sclera: Conjunctivae normal.   Neck:      Thyroid: No thyroid mass or thyromegaly.      Trachea: Trachea normal.   Cardiovascular:      Rate and Rhythm: Normal rate and regular rhythm.   Pulmonary:      Effort: Pulmonary effort is normal. No respiratory distress.      Breath sounds: Normal breath sounds.   Abdominal:      Palpations: Abdomen is soft.   Musculoskeletal:         General: Normal range of motion.      Cervical back: Neck supple. No edema or erythema. No spinous process tenderness or muscular tenderness. Normal range of motion.      Comments: Uses a cane   Lymphadenopathy:      Head:      Right side of head: No submental, submandibular, tonsillar, preauricular or posterior auricular adenopathy.      Left side of head: No submental, submandibular, tonsillar, preauricular, posterior auricular or occipital adenopathy.   Skin:     General: Skin is warm and dry.   Neurological:      Mental Status: She is alert and oriented to person, place, and time.      Cranial Nerves: No cranial nerve deficit.      Sensory: No sensory deficit.   Psychiatric:         Behavior: Behavior normal.         Thought Content: Thought content normal.         Judgment: Judgment normal.             Diagnoses and health risks identified today and associated recommendations/orders:    1. Encounter for preventive health examination  Annual Health Risk Assessment (HRA) visit today.  Counseling and referral of health maintenance and preventative health measures performed.  Patient given annual wellness paperwork to take home.  Encouraged to return in 1 year for subsequent HRA visit.     2. Coronary artery disease involving native coronary artery of native heart without angina pectoris  Chronic. Stable. Continue current treatment plan as previously prescribed by PCP.    3. Stage 3a chronic kidney disease  Chronic. Stable. Continue current treatment plan as previously prescribed by PCP.    4. Dyslipidemia  Chronic. Stable.  Continue current treatment plan as previously prescribed by PCP.    5. Bilateral carotid artery stenosis  Chronic. Stable. Continue current treatment plan as previously prescribed by PCP.    6. Primary hypertension  Chronic. Stable. Controlled. Encouraged to increase exercise as tolerated (moderate-intensity aerobic activity and muscle-strengthening activities) improve diet to heart healthy, low sodium diet.  Continue current treatment plan as previously prescribed by PCP.    7. Subclinical hyperthyroidism  Chronic. Stable. Continue current treatment plan as previously prescribed by PCP.    8. Gastroesophageal reflux disease without esophagitis  Chronic. Stable. Continue current treatment plan as previously prescribed by PCP.    9. Allergic rhinitis due to pollen, unspecified seasonality  Chronic. Stable. Continue current treatment plan as previously prescribed by PCP.    10. Viral upper respiratory tract infection  Chronic. Stable. Continue current treatment plan as previously prescribed by PCP.    11. Nuclear sclerotic cataract of left eye  Chronic. Stable. Continue current treatment plan as previously prescribed by PCP.    12. SAH (subarachnoid hemorrhage)  Chronic. Stable. Continue current treatment plan as previously prescribed by PCP.        Provided Jessica with a 5-10 year written screening schedule and personal prevention plan. Recommendations were developed using the USPSTF age appropriate recommendations. Education, counseling, and referrals were provided as needed. After Visit Summary printed and given to patient which includes a list of additional screenings\tests needed.      I offered to discuss end of life issues, including information on how to make advance directives that the patient could use to name someone who would make medical decisions on their behalf if they became too ill to make themselves.    ___Patient declined  _X_Patient is interested, I provided paper work and offered to discuss.    No  follow-ups on file.    KENNETH Ramachandran offered to discuss advanced care planning, including how to pick a person who would make decisions for you if you were unable to make them for yourself, called a health care power of , and what kind of decisions you might make such as use of life sustaining treatments such as ventilators and tube feeding when faced with a life limiting illness recorded on a living will that they will need to know. (How you want to be cared for as you near the end of your natural life)     X Patient is interested in learning more about how to make advanced directives.  I provided them paperwork and offered to discuss this with them.

## 2023-01-27 ENCOUNTER — HOSPITAL ENCOUNTER (EMERGENCY)
Facility: OTHER | Age: 73
Discharge: HOME | End: 2023-01-27
Attending: EMERGENCY MEDICINE
Payer: MEDICARE

## 2023-01-27 VITALS
BODY MASS INDEX: 24.1 KG/M2 | WEIGHT: 136 LBS | HEART RATE: 82 BPM | DIASTOLIC BLOOD PRESSURE: 72 MMHG | SYSTOLIC BLOOD PRESSURE: 156 MMHG | RESPIRATION RATE: 17 BRPM | HEIGHT: 63 IN | TEMPERATURE: 98 F | OXYGEN SATURATION: 99 %

## 2023-01-27 DIAGNOSIS — R11.0 NAUSEA: Primary | ICD-10-CM

## 2023-01-27 DIAGNOSIS — M25.511 RIGHT SHOULDER PAIN: ICD-10-CM

## 2023-01-27 PROCEDURE — 99284 EMERGENCY DEPT VISIT MOD MDM: CPT | Mod: 25,HCNC

## 2023-01-27 PROCEDURE — 63600175 PHARM REV CODE 636 W HCPCS: Mod: HCNC | Performed by: NURSE PRACTITIONER

## 2023-01-27 PROCEDURE — 25000003 PHARM REV CODE 250: Mod: HCNC | Performed by: NURSE PRACTITIONER

## 2023-01-27 PROCEDURE — 96372 THER/PROPH/DIAG INJ SC/IM: CPT | Performed by: NURSE PRACTITIONER

## 2023-01-27 RX ORDER — OXYCODONE AND ACETAMINOPHEN 5; 325 MG/1; MG/1
1 TABLET ORAL EVERY 6 HOURS PRN
Qty: 12 TABLET | Refills: 0 | Status: SHIPPED | OUTPATIENT
Start: 2023-01-27 | End: 2023-03-14

## 2023-01-27 RX ORDER — OXYCODONE AND ACETAMINOPHEN 7.5; 325 MG/1; MG/1
1 TABLET ORAL EVERY 4 HOURS PRN
Status: DISCONTINUED | OUTPATIENT
Start: 2023-01-27 | End: 2023-01-27 | Stop reason: HOSPADM

## 2023-01-27 RX ORDER — PROMETHAZINE HYDROCHLORIDE 25 MG/1
25 TABLET ORAL EVERY 6 HOURS PRN
Qty: 12 TABLET | Refills: 0 | Status: SHIPPED | OUTPATIENT
Start: 2023-01-27 | End: 2023-03-14

## 2023-01-27 RX ORDER — ONDANSETRON 8 MG/1
8 TABLET, ORALLY DISINTEGRATING ORAL
Status: COMPLETED | OUTPATIENT
Start: 2023-01-27 | End: 2023-01-27

## 2023-01-27 RX ORDER — LIDOCAINE 50 MG/G
1 PATCH TOPICAL ONCE
Status: DISCONTINUED | OUTPATIENT
Start: 2023-01-27 | End: 2023-01-27 | Stop reason: HOSPADM

## 2023-01-27 RX ORDER — PROMETHAZINE HYDROCHLORIDE 25 MG/1
25 TABLET ORAL
Status: COMPLETED | OUTPATIENT
Start: 2023-01-27 | End: 2023-01-27

## 2023-01-27 RX ORDER — KETOROLAC TROMETHAMINE 30 MG/ML
10 INJECTION, SOLUTION INTRAMUSCULAR; INTRAVENOUS
Status: COMPLETED | OUTPATIENT
Start: 2023-01-27 | End: 2023-01-27

## 2023-01-27 RX ORDER — METHOCARBAMOL 750 MG/1
750 TABLET, FILM COATED ORAL 3 TIMES DAILY
Qty: 10 TABLET | Refills: 0 | Status: SHIPPED | OUTPATIENT
Start: 2023-01-27 | End: 2023-03-14

## 2023-01-27 RX ORDER — LIDOCAINE 50 MG/G
1 PATCH TOPICAL DAILY
Qty: 15 PATCH | Refills: 0 | Status: ON HOLD | OUTPATIENT
Start: 2023-01-27 | End: 2023-05-30 | Stop reason: HOSPADM

## 2023-01-27 RX ORDER — ONDANSETRON 4 MG/1
4 TABLET, ORALLY DISINTEGRATING ORAL EVERY 8 HOURS PRN
Qty: 20 TABLET | Refills: 0 | OUTPATIENT
Start: 2023-01-27 | End: 2023-02-18

## 2023-01-27 RX ORDER — IBUPROFEN 600 MG/1
600 TABLET ORAL EVERY 6 HOURS PRN
Qty: 20 TABLET | Refills: 0 | Status: ON HOLD | OUTPATIENT
Start: 2023-01-27 | End: 2023-05-30 | Stop reason: HOSPADM

## 2023-01-27 RX ADMIN — OXYCODONE AND ACETAMINOPHEN 1 TABLET: 7.5; 325 TABLET ORAL at 04:01

## 2023-01-27 RX ADMIN — KETOROLAC TROMETHAMINE 10 MG: 30 INJECTION, SOLUTION INTRAMUSCULAR; INTRAVENOUS at 04:01

## 2023-01-27 RX ADMIN — PROMETHAZINE HYDROCHLORIDE 25 MG: 25 TABLET ORAL at 06:01

## 2023-01-27 RX ADMIN — LIDOCAINE 1 PATCH: 50 PATCH CUTANEOUS at 06:01

## 2023-01-27 RX ADMIN — ONDANSETRON 8 MG: 8 TABLET, ORALLY DISINTEGRATING ORAL at 05:01

## 2023-01-27 NOTE — ED TRIAGE NOTES
"Pt presents the ED with c/o right shoulder pain. Pt states she was using a bar in a bathroom and "heard a pop in my shoulder." Pt states she was seen for shoulder pain and given steroids. LROM noted to right arm due to pain, 2+ radial pulse, +sensation, cap refill < 3 noted to RUE.  "

## 2023-01-27 NOTE — ED PROVIDER NOTES
Source of History:  Patient    Chief complaint:  Shoulder Pain (Pt c.o right shoulder pain onset approx 30 min pta. Pt states she has had pain in right shoulder and seeing dr for this pain. Pt had steriod injection last week. Pt states she was in bathroom at restaurant a was holding on to bar and felt something pop. Pt states pain increased at that time.   No obvious deformity noted to right shouler  pt has limited ROM due to pain. + right radial pulse noted  )      HPI:  Jessica Floyd is a 72 y.o. female with past medical history of right shoulder pain presenting with acute worsening of her right shoulder pain.  Patient states that she was at lunch and she went to the bathroom.  She used her hand to help push herself up from a low toilet when she felt a pop and had excruciating pain in her right shoulder.  Her pain has been ongoing since early in January.  She was given a Medrol Dosepak without relief.  On 01/23/2023, she was seen in clinic and was given an intra-articular joint injection.  Patient states that since the injection the pain was improving until today.  Patient is reluctant to move the arm secondary to pain.  She has not taken anything since the injury for pain.  She states the pain is sharp in the right shoulder and now is rating a eating up into her neck and down her arm.    This is the extent to the patients complaints today here in the emergency department.    ROS: As per HPI and below:  General: No fever.  No chills.  Eyes: No visual changes.   ENT: No sore throat. No ear pain.  Urinary: No abnormal urination.  MSK: +right shoulder pain  Integument: No rashes or lesions.    Review of patient's allergies indicates:  No Known Allergies    PMH:  As per HPI and below:  Past Medical History:   Diagnosis Date    Allergic rhinitis     Amblyopia     Carotid stenosis     Coronary atherosclerosis     Outside [facility] 6/2014: 20% mLAD, 50% mCx, 20%, mRCA    Dyslipidemia     ESBL (extended spectrum  "beta-lactamase) producing bacteria infection     UTI    Hypertension     Nausea and vomiting 2017    Subarachnoid hemorrhage due to ruptured aneurysm      Past Surgical History:   Procedure Laterality Date    ADENOIDECTOMY      ANTERIOR CRUCIATE LIGAMENT REPAIR      APPENDECTOMY      CATARACT EXTRACTION W/  INTRAOCULAR LENS IMPLANT Right 2022    Procedure: EXTRACTION, CATARACT, WITH IOL INSERTION;  Surgeon: Higinio Cristina MD;  Location: [facility];  Service: Ophthalmology;  Laterality: Right;    CATARACT EXTRACTION W/  INTRAOCULAR LENS IMPLANT Left 2022    Procedure: EXTRACTION, CATARACT, WITH IOL INSERTION;  Surgeon: Higinio Cristina MD;  Location: [facility];  Service: Ophthalmology;  Laterality: Left;    CEREBRAL ANEURYSM REPAIR      clip     SECTION      DENTAL SURGERY      EYE SURGERY Bilateral     cataract removal and lens implants    TONSILLECTOMY         Social History     Tobacco Use    Smoking status: Former     Packs/day: 0.50     Years: 20.00     Pack years: 10.00     Types: Cigarettes     Quit date: 1999     Years since quittin.0    Smokeless tobacco: Never   Substance Use Topics    Alcohol use: Yes     Alcohol/week: 7.0 standard drinks     Types: 7 Glasses of wine per week    Drug use: No       Physical Exam:    BP (!) 156/72   Pulse 82   Temp 98.1 °F (36.7 °C) (Oral)   Resp 17   Ht 5' 3" (1.6 m)   Wt 61.7 kg (136 lb)   SpO2 99%   BMI 24.09 kg/m²   Nursing note and vital signs reviewed.  Appearance: No acute distress.  Eyes: No conjunctival injection.  ENT: Normal phonation.  Musculoskeletal: Resistant to participate with exam secondary to pain.  Patient guarding right arm.  Tenderness to palpation of anterior and posterior shoulder.  No point tenderness at the bicipital groove.  No Mir deformity.  Radial pulse 2+.  Good range of motion of remaining joints  Skin: No rashes seen.  Good turgor.  No abrasions.  No ecchymoses.  Mental Status:  Alert and " oriented x 3.  Appropriate, conversant.      I decided to obtain the patient's medical records.  Summary of Medical Records:  RECOMMENDATIONS (from clinic 1/23/23)     Right shoulder subacromial corticosteroid injection was administered under ultrasound guidance  Return to clinic in 1 month for repeat evaluation; If symptoms have not improved, a CT arthrogram of the right shoulder will be ordered for evaluation of the rotator cuff  (Unable to have an MRI due to a metal clip in brain)    Initial Impression/ Differential Dx:  Urgent evaluation of 72-year-old female with known right shoulder pain presenting with acute worsening of her right shoulder pain after pushing herself up from a low seated toilet.  Patient is afebrile, appears uncomfortable not toxic and hemodynamically stable.  No reported symptoms on history or signs on physical exam to suggest septic joint or infectious process.  Patient is reluctant to participate in exam secondary to pain.  She is tearful and appears uncomfortable.  She is guarding her right arm.  She has tenderness on the anterior and posterior shoulder on palpation.  Suspect that this is he rotator cuff injury.  No point tenderness at the bicipital groove or Mir's deformity to suggest biceps tendon rupture.  Chart review shows that next step is CT arthrogram of the right shoulder which is unable to be performed in the emergency department.  Will contact patient's PA from sports medicine clinic for recommendations as well of treat patient's pain.    Armani Cai PA-C recommends pain control, repeat plain x-rays and states that they will order the CT arthrogram on Monday and see her in clinic next week.    Plan for pain control and x-rays    Differential Diagnosis includes, but is not limited to:  Fracture, dislocation, compartment syndrome, nerve injury/palsy, vascular injury, DVT, rhabdomyolysis, hemarthrosis, septic joint, cellulitis, bursitis, muscle strain, ligament tear/sprain,  laceration, foreign body, abrasion, soft tissue contusion, osteoarthritis.      MDM:        ED Course as of 01/27/23 1856   Fri Jan 27, 2023 1806 X-Ray Shoulder Complete 2 View Right  No acute osseous abnormality identified. [CU]   1823 At bedside to reassess patient.  She states that the pain has improved but she continues to feel nauseous and just does not feel well.  She also reports she feels a little bit dizzy.  This may be a side effect the medication.  Offered patient continued observation as well as fluids but she states a preference for going home with pain meds and antiemetics as she has follow-up scheduled with her provider at main campus of the sports medicine clinic.  Placed in a sling for comfort. Will given additional dose of antiemetics while here in the emergency department and discharged home with multiple modalities of antiemetics.  Will discharge home with short course of pain meds, Lidoderm patches as well as a trial of Robaxin.  Will avoid given Robaxin here in the emergency department as it could worsen reported dizziness.  Patient amenable to plan and discharged home in good condition. Patient educated on signs and symptoms to monitor for and when to return to ED. Patient verbalized understanding agrees with treatment plan. All questions and concerns addressed.      [CU]      ED Course User Index  [CU] German Clayton NP               Diagnostic Impression:    1. Nausea    2. Right shoulder pain         ED Disposition Condition    Discharge Good            ED Prescriptions       Medication Sig Dispense Start Date End Date Auth. Provider    ibuprofen (ADVIL,MOTRIN) 600 MG tablet Take 1 tablet (600 mg total) by mouth every 6 (six) hours as needed for Pain. 20 tablet 1/27/2023 -- German Clayton NP    methocarbamoL (ROBAXIN) 750 MG Tab Take 1 tablet (750 mg total) by mouth 3 (three) times daily. 10 tablet 1/27/2023 -- German Clayton NP    LIDOcaine (LIDODERM) 5 % Place 1 patch onto the  skin once daily. Remove & Discard patch within 12 hours or as directed by MD 15 patch 1/27/2023 -- German Clayton NP    ondansetron (ZOFRAN-ODT) 4 MG TbDL Take 1 tablet (4 mg total) by mouth every 8 (eight) hours as needed. 20 tablet 1/27/2023 -- Germna Clayton NP    promethazine (PHENERGAN) 25 MG tablet Take 1 tablet (25 mg total) by mouth every 6 (six) hours as needed for Nausea. 12 tablet 1/27/2023 -- German Clayton NP    oxyCODONE-acetaminophen (PERCOCET) 5-325 mg per tablet Take 1 tablet by mouth every 6 (six) hours as needed for Pain. 12 tablet 1/27/2023 -- German Clayton NP          Follow-up Information       Follow up With Specialties Details Why Contact Info    Armani Cai PA-C Orthopedic Surgery   [address]  [facility] [address]  [phone]               German Clayton NP  01/27/23 8660

## 2023-01-28 ENCOUNTER — PATIENT MESSAGE (OUTPATIENT)
Dept: SPORTS MEDICINE | Facility: CLINIC | Age: 73
End: 2023-01-28
Payer: MEDICARE

## 2023-01-28 DIAGNOSIS — M25.511 ACUTE PAIN OF RIGHT SHOULDER: Primary | ICD-10-CM

## 2023-01-30 ENCOUNTER — PATIENT MESSAGE (OUTPATIENT)
Dept: SPORTS MEDICINE | Facility: CLINIC | Age: 73
End: 2023-01-30
Payer: MEDICARE

## 2023-02-03 ENCOUNTER — HOSPITAL ENCOUNTER (OUTPATIENT)
Dept: RADIOLOGY | Facility: HOSPITAL | Age: 73
Discharge: HOME | End: 2023-02-03
Attending: PHYSICIAN ASSISTANT
Payer: MEDICARE

## 2023-02-03 DIAGNOSIS — M25.511 ACUTE PAIN OF RIGHT SHOULDER: ICD-10-CM

## 2023-02-03 PROCEDURE — 73201 CT UPPER EXTREMITY W/DYE: CPT | Mod: TC,HCNC,RT

## 2023-02-03 PROCEDURE — 73201 CT ARTHROGRAM SHOULDER RIGHT: ICD-10-PCS | Mod: 26,HCNC,RT, | Performed by: RADIOLOGY

## 2023-02-03 PROCEDURE — 73201 CT UPPER EXTREMITY W/DYE: CPT | Mod: 26,HCNC,RT, | Performed by: RADIOLOGY

## 2023-02-03 PROCEDURE — 25500020 PHARM REV CODE 255: Mod: HCNC | Performed by: PHYSICIAN ASSISTANT

## 2023-02-03 PROCEDURE — 25000003 PHARM REV CODE 250: Mod: HCNC | Performed by: PHYSICIAN ASSISTANT

## 2023-02-03 PROCEDURE — 77002 NEEDLE LOCALIZATION BY XRAY: CPT | Mod: 26,HCNC,RT, | Performed by: RADIOLOGY

## 2023-02-03 PROCEDURE — 77002 XR ARTHROGRAM SHOULDER RIGHT, INJECTION ONLY WITH CT TO FOLLOW (XPD): ICD-10-PCS | Mod: 26,HCNC,RT, | Performed by: RADIOLOGY

## 2023-02-03 PROCEDURE — 23350 INJECTION FOR SHOULDER X-RAY: CPT | Mod: HCNC,RT,, | Performed by: RADIOLOGY

## 2023-02-03 PROCEDURE — 23350 XR ARTHROGRAM SHOULDER RIGHT, INJECTION ONLY WITH CT TO FOLLOW (XPD): ICD-10-PCS | Mod: HCNC,RT,, | Performed by: RADIOLOGY

## 2023-02-03 RX ORDER — LIDOCAINE HYDROCHLORIDE 10 MG/ML
2 INJECTION INFILTRATION; PERINEURAL ONCE
Status: COMPLETED | OUTPATIENT
Start: 2023-02-03 | End: 2023-02-03

## 2023-02-03 RX ADMIN — IOHEXOL 6 ML: 300 INJECTION, SOLUTION INTRAVENOUS at 11:02

## 2023-02-03 RX ADMIN — LIDOCAINE HYDROCHLORIDE 2 ML: 10 INJECTION, SOLUTION INFILTRATION; PERINEURAL at 11:02

## 2023-02-06 ENCOUNTER — OFFICE VISIT (OUTPATIENT)
Dept: SPORTS MEDICINE | Facility: CLINIC | Age: 73
End: 2023-02-06
Payer: MEDICARE

## 2023-02-06 VITALS
HEIGHT: 63 IN | SYSTOLIC BLOOD PRESSURE: 138 MMHG | DIASTOLIC BLOOD PRESSURE: 72 MMHG | BODY MASS INDEX: 24.1 KG/M2 | WEIGHT: 136 LBS

## 2023-02-06 DIAGNOSIS — M19.011 LOCALIZED PRIMARY OSTEOARTHRITIS OF RIGHT SHOULDER REGION: ICD-10-CM

## 2023-02-06 DIAGNOSIS — S46.011A TRAUMATIC COMPLETE TEAR OF RIGHT ROTATOR CUFF, INITIAL ENCOUNTER: Primary | ICD-10-CM

## 2023-02-06 PROCEDURE — 3008F PR BODY MASS INDEX (BMI) DOCUMENTED: ICD-10-PCS | Mod: HCNC,CPTII,S$GLB, | Performed by: PHYSICIAN ASSISTANT

## 2023-02-06 PROCEDURE — 3075F SYST BP GE 130 - 139MM HG: CPT | Mod: HCNC,CPTII,S$GLB, | Performed by: PHYSICIAN ASSISTANT

## 2023-02-06 PROCEDURE — 99999 PR PBB SHADOW E&M-EST. PATIENT-LVL IV: CPT | Mod: PBBFAC,HCNC,, | Performed by: PHYSICIAN ASSISTANT

## 2023-02-06 PROCEDURE — 1101F PR PT FALLS ASSESS DOC 0-1 FALLS W/OUT INJ PAST YR: ICD-10-PCS | Mod: HCNC,CPTII,S$GLB, | Performed by: PHYSICIAN ASSISTANT

## 2023-02-06 PROCEDURE — 1160F PR REVIEW ALL MEDS BY PRESCRIBER/CLIN PHARMACIST DOCUMENTED: ICD-10-PCS | Mod: HCNC,CPTII,S$GLB, | Performed by: PHYSICIAN ASSISTANT

## 2023-02-06 PROCEDURE — 3075F PR MOST RECENT SYSTOLIC BLOOD PRESS GE 130-139MM HG: ICD-10-PCS | Mod: HCNC,CPTII,S$GLB, | Performed by: PHYSICIAN ASSISTANT

## 2023-02-06 PROCEDURE — 1159F MED LIST DOCD IN RCRD: CPT | Mod: HCNC,CPTII,S$GLB, | Performed by: PHYSICIAN ASSISTANT

## 2023-02-06 PROCEDURE — 1125F AMNT PAIN NOTED PAIN PRSNT: CPT | Mod: HCNC,CPTII,S$GLB, | Performed by: PHYSICIAN ASSISTANT

## 2023-02-06 PROCEDURE — 3288F PR FALLS RISK ASSESSMENT DOCUMENTED: ICD-10-PCS | Mod: HCNC,CPTII,S$GLB, | Performed by: PHYSICIAN ASSISTANT

## 2023-02-06 PROCEDURE — 3288F FALL RISK ASSESSMENT DOCD: CPT | Mod: HCNC,CPTII,S$GLB, | Performed by: PHYSICIAN ASSISTANT

## 2023-02-06 PROCEDURE — 99214 PR OFFICE/OUTPT VISIT, EST, LEVL IV, 30-39 MIN: ICD-10-PCS | Mod: HCNC,S$GLB,, | Performed by: PHYSICIAN ASSISTANT

## 2023-02-06 PROCEDURE — 3008F BODY MASS INDEX DOCD: CPT | Mod: HCNC,CPTII,S$GLB, | Performed by: PHYSICIAN ASSISTANT

## 2023-02-06 PROCEDURE — 3078F DIAST BP <80 MM HG: CPT | Mod: HCNC,CPTII,S$GLB, | Performed by: PHYSICIAN ASSISTANT

## 2023-02-06 PROCEDURE — 99214 OFFICE O/P EST MOD 30 MIN: CPT | Mod: HCNC,S$GLB,, | Performed by: PHYSICIAN ASSISTANT

## 2023-02-06 PROCEDURE — 1101F PT FALLS ASSESS-DOCD LE1/YR: CPT | Mod: HCNC,CPTII,S$GLB, | Performed by: PHYSICIAN ASSISTANT

## 2023-02-06 PROCEDURE — 3078F PR MOST RECENT DIASTOLIC BLOOD PRESSURE < 80 MM HG: ICD-10-PCS | Mod: HCNC,CPTII,S$GLB, | Performed by: PHYSICIAN ASSISTANT

## 2023-02-06 PROCEDURE — 1125F PR PAIN SEVERITY QUANTIFIED, PAIN PRESENT: ICD-10-PCS | Mod: HCNC,CPTII,S$GLB, | Performed by: PHYSICIAN ASSISTANT

## 2023-02-06 PROCEDURE — 1160F RVW MEDS BY RX/DR IN RCRD: CPT | Mod: HCNC,CPTII,S$GLB, | Performed by: PHYSICIAN ASSISTANT

## 2023-02-06 PROCEDURE — 99999 PR PBB SHADOW E&M-EST. PATIENT-LVL IV: ICD-10-PCS | Mod: PBBFAC,HCNC,, | Performed by: PHYSICIAN ASSISTANT

## 2023-02-06 PROCEDURE — 1159F PR MEDICATION LIST DOCUMENTED IN MEDICAL RECORD: ICD-10-PCS | Mod: HCNC,CPTII,S$GLB, | Performed by: PHYSICIAN ASSISTANT

## 2023-02-06 NOTE — PROGRESS NOTES
ESTABLISHED PATIENT    History 02/06/2023:  Jessica returns here today for follow-up evaluation of her right shoulder.  She was given a subacromial corticosteroid injection at her last visit.  While attempting to lift herself up from a restroom toilet, she felt a pop in her shoulder and had immediate discomfort.  She was seen in the emergency room for this.  A CT arthrogram was ordered for further evaluation of her rotator cuff.  She is here today to discuss the results.  She has been using a shoulder sling intermittently for pain relief.    History 01/23/2023:  Jessica returns here today for follow-up evaluation of her right shoulder.  She states that the Medrol Dosepak gave her very minimal relief.  She has had worsening pain and weakness since her last visit.  She is having a lot of difficulty getting dressed and sleeping at night.  She has been taking Tylenol p.m. at night which has helped her get some sleep.    Of note, she states that her knee pain has resolved following the Orthovisc series.    History 01/10/2023:  72 y.o. Female with a history of right shoulder pain who began having pain 1 week ago.  She denies having any precipitating injury or trauma but states that she was moving a lot of boxes which may have irritated her shoulder.  She is having a lot of discomfort with movement and at night while sleeping.  She has had difficulty getting dressed due to the pain and is frequently dropping objects due to weakness.        + mechanical symptoms, - instability    Is affecting ADLs.  Pain is 10/10 at it's worst.        PAST MEDICAL HISTORY    Past Medical History:   Diagnosis Date    Allergic rhinitis     Amblyopia     Carotid stenosis     Coronary atherosclerosis     Outside [facility] 6/2014: 20% mLAD, 50% mCx, 20%, mRCA    Dyslipidemia     ESBL (extended spectrum beta-lactamase) producing bacteria infection     UTI    Hypertension     Nausea and vomiting 05/26/2017    Subarachnoid hemorrhage due to ruptured  aneurysm        PAST SURGICAL HISTORY     Past Surgical History:   Procedure Laterality Date    ADENOIDECTOMY      ANTERIOR CRUCIATE LIGAMENT REPAIR  2012    APPENDECTOMY      CATARACT EXTRACTION W/  INTRAOCULAR LENS IMPLANT Right 2022    Procedure: EXTRACTION, CATARACT, WITH IOL INSERTION;  Surgeon: Higinio Cristina MD;  Location: [facility];  Service: Ophthalmology;  Laterality: Right;    CATARACT EXTRACTION W/  INTRAOCULAR LENS IMPLANT Left 2022    Procedure: EXTRACTION, CATARACT, WITH IOL INSERTION;  Surgeon: Higinio Cristina MD;  Location: [facility];  Service: Ophthalmology;  Laterality: Left;    CEREBRAL ANEURYSM REPAIR      clip     SECTION      DENTAL SURGERY      EYE SURGERY Bilateral     cataract removal and lens implants    TONSILLECTOMY         FAMILY HISTORY    Family History   Problem Relation Age of Onset    Hypertension Mother     Aneurysm Mother     Hypertension Father     Alcohol abuse Father     Kidney disease Brother     Heart disease Brother     Gout Brother     No Known Problems Daughter     No Known Problems Daughter     No Known Problems Daughter        SOCIAL HISTORY    Social History     Socioeconomic History    Marital status:    Occupational History    Occupation: Retired   Tobacco Use    Smoking status: Former     Packs/day: 0.50     Years: 20.00     Pack years: 10.00     Types: Cigarettes     Quit date: 1999     Years since quittin.1    Smokeless tobacco: Never   Substance and Sexual Activity    Alcohol use: Yes     Alcohol/week: 7.0 standard drinks     Types: 7 Glasses of wine per week    Drug use: No    Sexual activity: Yes     Partners: Male   Social History Narrative    .  Has 3 grown daughters.   lives in [facility].       Social Determinants of Health     Financial Resource Strain: Low Risk     Difficulty of Paying Living Expenses: Not hard at all   Food Insecurity: No Food Insecurity    Worried About Running Out of Food in the Last  Year: Never true    Ran Out of Food in the Last Year: Never true   Transportation Needs: Unmet Transportation Needs    Lack of Transportation (Medical): Yes    Lack of Transportation (Non-Medical): Yes   Physical Activity: Inactive    Days of Exercise per Week: 0 days    Minutes of Exercise per Session: 0 min   Stress: Stress Concern Present    Feeling of Stress : To some extent   Social Connections: Moderately Integrated    Frequency of Communication with Friends and Family: More than three times a week    Frequency of Social Gatherings with Friends and Family: More than three times a week    Attends Christian Services: More than 4 times per year    Active Member of Clubs or Organizations: No    Attends Club or Organization Meetings: Never    Marital Status:    Housing Stability: Unknown    Unable to Pay for Housing in the Last Year: No    Unstable Housing in the Last Year: No       MEDICATIONS      Current Outpatient Medications:     allopurinoL (ZYLOPRIM) 100 MG tablet, Take 0.5 tablets (50 mg total) by mouth once daily., Disp: 30 tablet, Rfl: 11    amLODIPine (NORVASC) 10 MG tablet, TAKE 1 TABLET BY MOUTH EVERY DAY, Disp: 90 tablet, Rfl: 3    amoxicillin (AMOXIL) 500 MG capsule, TAKE 2 CAPSULES BY MOUTH NOW, THEN ONE CAPSULE THREE TIMES A DAY UNTIL COMPLETELY FINISHED, Disp: , Rfl:     atorvastatin (LIPITOR) 80 MG tablet, TAKE 1 TABLET BY MOUTH EVERY DAY, Disp: 90 tablet, Rfl: 3    carvediloL (COREG) 12.5 MG tablet, TAKE 1 TABLET BY MOUTH TWICE A DAY WITH MEALS, Disp: 180 tablet, Rfl: 3    colchicine (MITIGARE) 0.6 mg Cap, Take 1 capsule (0.6 mg total) by mouth once daily at 6am., Disp: 30 capsule, Rfl: 11    fluticasone propionate (FLONASE) 50 mcg/actuation nasal spray, SPRAY ONCE INTO EACH NOSTRIL ONCE DAILY, Disp: 16 mL, Rfl: 3    hydroCHLOROthiazide (HYDRODIURIL) 25 MG tablet, TAKE 1 TABLET BY MOUTH EVERY DAY, Disp: 90 tablet, Rfl: 3    hydrocortisone 2.5 % ointment, as needed., Disp: , Rfl:      ibuprofen (ADVIL,MOTRIN) 600 MG tablet, Take 1 tablet (600 mg total) by mouth every 6 (six) hours as needed for Pain., Disp: 20 tablet, Rfl: 0    ketoconazole (NIZORAL) 2 % cream, APPLY BETWEEN TOES TWICE DAILY X 2 WKS, Disp: , Rfl:     LIDOcaine (LIDODERM) 5 %, Place 1 patch onto the skin once daily. Remove & Discard patch within 12 hours or as directed by MD, Disp: 15 patch, Rfl: 0    methocarbamoL (ROBAXIN) 750 MG Tab, Take 1 tablet (750 mg total) by mouth 3 (three) times daily., Disp: 10 tablet, Rfl: 0    ondansetron (ZOFRAN-ODT) 4 MG TbDL, Take 1 tablet (4 mg total) by mouth every 8 (eight) hours as needed., Disp: 20 tablet, Rfl: 0    oxyCODONE-acetaminophen (PERCOCET) 5-325 mg per tablet, Take 1 tablet by mouth every 6 (six) hours as needed for Pain., Disp: 12 tablet, Rfl: 0    pantoprazole (PROTONIX) 40 MG tablet, Take 1 tablet (40 mg total) by mouth once daily., Disp: 90 tablet, Rfl: 3    promethazine (PHENERGAN) 25 MG tablet, Take 1 tablet (25 mg total) by mouth every 6 (six) hours as needed for Nausea., Disp: 12 tablet, Rfl: 0    aspirin (ECOTRIN) 81 MG EC tablet, Take 1 tablet (81 mg total) by mouth once daily., Disp: , Rfl: 0  No current facility-administered medications for this visit.    Facility-Administered Medications Ordered in Other Visits:     sodium hyaluronate (EUFLEXXA) 10 mg/mL(mw 2.4 -3.6 million) injection 20 mg, 20 mg, Intra-articular, , Armani Cai PA-C, 20 mg at 11/14/22 0830    ALLERGIES     Review of patient's allergies indicates:  No Known Allergies      REVIEW OF SYSTEMS   Constitution: Negative. Negative for chills, fever and night sweats.   HENT: Negative for congestion and headaches.    Eyes: Negative for blurred vision, left vision loss and right vision loss.   Cardiovascular: Negative for chest pain and syncope.   Respiratory: Negative for cough and shortness of breath.    Endocrine: Negative for polydipsia, polyphagia and polyuria.   Hematologic/Lymphatic: Negative for  "bleeding problem. Does not bruise/bleed easily.   Skin: Negative for dry skin, itching and rash.   Musculoskeletal: Negative for falls. Positive for right shoulder pain and weakness.  Gastrointestinal: Negative for abdominal pain and bowel incontinence.   Genitourinary: Negative for bladder incontinence and nocturia.   Neurological: Negative for disturbances in coordination, loss of balance and seizures.   Psychiatric/Behavioral: Negative for depression. The patient does not have insomnia.    Allergic/Immunologic: Negative for hives and persistent infections.     PHYSICAL EXAMINATION    Vitals: /72 (BP Location: Left arm, Patient Position: Sitting, BP Method: Medium (Manual))   Ht 5' 3" (1.6 m)   Wt 61.7 kg (136 lb)   BMI 24.09 kg/m²     General: The patient appears active and healthy with no apparent physical problems.  No disturbance of mood or affect is demonstrated. Alert and Oriented.    HEENT: Eyes normal, pupils equally round, nose normal.    Resp: Equal and symmetrical chest rises. No wheezing    CV: Regular rate    Neck: Supple; nonpainful range of motion.    Extremities: no cyanosis, clubbing, edema, or diffuse swelling.  Palpable pulses, good capillary refill of the digits.  No coolness, discoloration, edema or obvious varicosities.    Skin: no lesions noted.    Lymphatic: no detected adenopathy in the upper or lower extremities.    Neurologic: normal mental status, normal reflexes, normal gait and balance.  Patient is alert and oriented to person, place and time.  No flaccidity or spasticity is noted.  No motor or sensory deficits are noted.  Light touch is intact    Orthopaedic: Shoulder Musculoskeletal Exam    Inspection    Right      Right shoulder inspection is normal.      Ecchymosis: none      Symmetry: symmetric      Masses: none      Skin tenting: none      Prior incision: none    Inspection additional comments: + sling to the RUE    Palpation    Right      Crepitus: no crepitus      " Increased warmth: none      Tenderness: present        Anterior shoulder: moderate        Posterior shoulder: moderate        Clavicle: none        AC joint: mild        Sternoclavicular joint: none        Rotator cuff: moderate        Greater tuberosity: moderate        Superior pole of scapula: none        Inferior pole of scapula: none        Bicipital groove: mild        Proximal biceps: mild    Range of Motion    Right      Active ROM: abnormal and pain.       Passive ROM: abnormal and pain.       Active forward elevation: 60.       Passive forward elevation: 120.       Shoulder active abduction: 30.       Passive abduction: 50.       Active external rotation at side: 0.       Passive external rotation at side: 20.       Active external rotation in abduction: 10.       Passive external rotation in abduction: 20.       Internal rotation: side.     Left      Internal rotation: T10.     Strength    Right      External rotation: 3/5. External rotation is affected by pain.       Internal rotation: 3/5. Internal rotation is affected by pain.       Abduction: 3/5. Abduction is affected by pain.       Biceps: 5/5. Biceps are not affected by pain.       Triceps: 5/5. Triceps are not affected by pain.     Neurovascular    Right      Right shoulder nerve sensation is normal.    Scapula    Right      Right shoulder scapula is normal.    Special Tests    Right    Rotator Cuff Signs      Neer's test: positive       Bonilla test: positive       Supraspinatus: positive       Belly press test: positive       Painful arc test: positive       Drop arm test: positive     Biceps/trini Signs      Norfolk's test: negative       Clicking/popping: negative     AC Joint Signs      Active horizontal adduction pain: negative     Instability Signs      Joint laxity: negative       IMAGING    XR ARTHROGRAM SHOULDER RIGHT, INJECTION ONLY WITH CT TO FOLLOW (XPD)  Narrative: EXAMINATION:  XR ARTHROGRAM SHOULDER RIGHT, INJECTION ONLY WITH CT TO  FOLLOW (XPD)    CLINICAL HISTORY:  Pain in right shoulder    TECHNIQUE:  Written informed consent was obtained and the patient was prepped and draped in a sterile fashion.  The right shoulder was localized under fluoroscopy and 1% lidocaine was used to achieve local anesthesia.  A 22-gauge spinal needle was inserted into the joint via an anterior approach and position was confirmed with low resistance injection.  Solution of 12 mL dilated Omnipaque 300 (50% contrast: 50% normal saline) was injected.  Needle was removed and adequate hemostasis was achieved.    Fluoroscopy time = 27 seconds    COMPARISON:  Shoulder radiograph 01/27/2020    FINDINGS:  Intra-articular contrast is noted.  Impression: Successful right shoulder arthrogram.  CT report to follow.    Electronically signed by resident: Misha Greenwood  Date:    02/03/2023  Time:    13:03    Electronically signed by: Musa Maloney MD  Date:    02/03/2023  Time:    15:02  CT Arthrogram Shoulder Right  Narrative: EXAMINATION:  CT ARTHROGRAM SHOULDER RIGHT    CLINICAL HISTORY:  Shoulder pain, rotator cuff disorder suspected, xray done;  Pain in right shoulder    TECHNIQUE:  Routine right multiplanar CT arthrogram shoulder performed after the intra-articular injection of contrast.    COMPARISON:  None    FINDINGS:  Rotator cuff: There is a large full-thickness rotator cuff tear involving the entire supra and infraspinatus tendons.  There is tendon retraction to the glenoid rim.  The teres minor and subscapularis are intact.  There is mild volume loss of both supra and infraspinatus muscles.  High-riding humeral head noted abutting the acromion.    Mild AC joint arthropathy.    The biceps tendon not identified, probably torn.    Generalized cartilage thinning/joint space narrowing noted with mild osteophytosis along the inferior margin of the humeral head.  No fractures or marrow replacement process.  No evidence of osteomyelitis.  No axillary lymphadenopathy.   Emphysematous changes noted in the right lung apex.  Impression: Large full-thickness rotator cuff tear, involving the infraspinatus and supraspinatus tendons, as above.    This report was flagged in Epic as abnormal.    Electronically signed by: Musa Maloney MD  Date:    02/03/2023  Time:    13:22        IMPRESSION       ICD-10-CM ICD-9-CM   1. Traumatic complete tear of right rotator cuff, initial encounter  S46.011A 840.4   2. Localized primary osteoarthritis of right shoulder region  M19.011 715.11           MEDICATIONS PRESCRIBED      None    RECOMMENDATIONS     Surgical and nonsurgical treatment options for rotator cuff tear with underlying OA were discussed today; needs to wait at least 90 days from her last injection before considering surgical intervention  Physical therapy for right shoulder strengthening and range of motion  Tylenol 1000 milligrams by mouth 3 times daily as needed for pain  Return to clinic in 6 weeks for repeat evaluation      All questions were answered, pt will contact us for questions or concerns in the interim.

## 2023-02-07 DIAGNOSIS — Z00.00 ENCOUNTER FOR MEDICARE ANNUAL WELLNESS EXAM: ICD-10-CM

## 2023-02-09 DIAGNOSIS — Z00.00 ENCOUNTER FOR MEDICARE ANNUAL WELLNESS EXAM: ICD-10-CM

## 2023-02-15 ENCOUNTER — CLINICAL SUPPORT (OUTPATIENT)
Dept: REHABILITATION | Facility: OTHER | Age: 73
End: 2023-02-15
Payer: MEDICARE

## 2023-02-15 DIAGNOSIS — S46.011A TRAUMATIC COMPLETE TEAR OF RIGHT ROTATOR CUFF, INITIAL ENCOUNTER: ICD-10-CM

## 2023-02-15 DIAGNOSIS — M19.011 LOCALIZED PRIMARY OSTEOARTHRITIS OF RIGHT SHOULDER REGION: ICD-10-CM

## 2023-02-15 PROCEDURE — 97110 THERAPEUTIC EXERCISES: CPT | Mod: HCNC,PN

## 2023-02-15 PROCEDURE — 97161 PT EVAL LOW COMPLEX 20 MIN: CPT | Mod: HCNC,PN

## 2023-02-15 NOTE — PROGRESS NOTES
[facility]  Physical Therapy Initial Evaluation    Name: Jessica Floyd  Clinic Number: [id number]    Therapy Diagnosis:   Encounter Diagnoses   Name Primary?    Traumatic complete tear of right rotator cuff, initial encounter     Localized primary osteoarthritis of right shoulder region      Physician: Armani Cai, KEYSHA    Physician Orders: Physical Therapy Evaluate and Treat  Medical Diagnosis from Referral:   S46.011A (ICD-10-CM) - Traumatic complete tear of right rotator cuff, initial encounter   M19.011 (ICD-10-CM) - Localized primary osteoarthritis of right shoulder region     Evaluation Date: 2/15/2023  Authorization Period Expiration: 2/6/2024  Plan of Care Expiration: 2/15/2023 to 4/12/2023  Visit # / Visits authorized: 1/1 (pending additional authorization following initial evaluation)   FOTO: 1/3  on 2/15/2023      Time In: 8:30am  Time Out: 9:15am  Total Billable Time: 45 minutes    Precautions: standard    Subjective     History of current condition - Jessica reports: the right shoulder had been hurting since about January when she was doing a lot of work to decorate her home.  She went to the ER about 2 weeks ago, she was sitting down on the toilet and it was lower than expected, she took a lot of weight through the shoulder and felt/heard a popping in the right shoulder.  Pt had imaging that showed R RTC tear and possible biceps tear.  States that the pain has not been getting much better since the tear.     Medical History:   Past Medical History:   Diagnosis Date    Allergic rhinitis     Amblyopia     Carotid stenosis     Coronary atherosclerosis     Outside [facility] 6/2014: 20% mLAD, 50% mCx, 20%, mRCA    Dyslipidemia     ESBL (extended spectrum beta-lactamase) producing bacteria infection     UTI    Hypertension     Nausea and vomiting 05/26/2017    Subarachnoid hemorrhage due to ruptured aneurysm 1999       Surgical History:   Jessica Floyd  has a past surgical history that  includes Anterior cruciate ligament repair (); Dental surgery; Cerebral aneurysm repair (); Tonsillectomy; Adenoidectomy; Appendectomy;  section; Cataract extraction w/  intraocular lens implant (Right, 2022); Cataract extraction w/  intraocular lens implant (Left, 2022); and Eye surgery (Bilateral).    Medications:   Jessica has a current medication list which includes the following prescription(s): allopurinol, amlodipine, amoxicillin, aspirin, atorvastatin, carvedilol, colchicine, fluticasone propionate, hydrochlorothiazide, hydrocortisone, ibuprofen, ketoconazole, lidocaine, methocarbamol, ondansetron, oxycodone-acetaminophen, pantoprazole, and promethazine, and the following Facility-Administered Medications: sodium hyaluronate (euflexxa).    Allergies:   Review of patient's allergies indicates:  No Known Allergies     Imaging: CT 2/3/2023  FINDINGS:  Rotator cuff: There is a large full-thickness rotator cuff tear involving the entire supra and infraspinatus tendons.  There is tendon retraction to the glenoid rim.  The teres minor and subscapularis are intact.  There is mild volume loss of both supra and infraspinatus muscles.  High-riding humeral head noted abutting the acromion.     Mild AC joint arthropathy.     The biceps tendon not identified, probably torn.     Generalized cartilage thinning/joint space narrowing noted with mild osteophytosis along the inferior margin of the humeral head.  No fractures or marrow replacement process.  No evidence of osteomyelitis.  No axillary lymphadenopathy.  Emphysematous changes noted in the right lung apex.  Impression:     Large full-thickness rotator cuff tear, involving the infraspinatus and supraspinatus tendons, as above.    Prior Therapy: yes for balance/strength/stamina  Social History: plays with granddaughter, yard work, cleaning  Prior Level of Function: no limitations  Current Level of Function: difficulty with reaching, lifting,  dressing, bathing, cleaning, etc.    Pain:  Current 0/10, worst 10/10, best 0/10   Location: right shoulder , from anterior to posterior  Description: Aching, Dull, and Sharp  Aggravating Factors: Night Time, Lifting, reaching, dressing, bathing  Easing Factors: rest, ice, heat, tylenol    Pts goals: Pt would like to return to ADLs with no increase in shoulder pain.    Objective     WNL=within normal limits  WFL=within functional limits  NT=not tested  *=pain    Observation: ambulates with SPC in RUE    Posture: forward shoulders, forward head posture    Passive Range of Motion:   Shoulder Right Left   Flexion 85* 160   Abduction 110* 155   ER at 45 35*    ER at 90  75   IR 45* 55      Active Range of Motion:   Shoulder Right Left   Flexion 87* 125   Abduction 72* 80                              Strength:  Shoulder Right Left   Flexion 3+/5* 4/5   Abduction 3+/5* 4/5   ER 3/5* 4-/5   IR 4-/5* 4/5                      Joint Mobility: hypomobility R GHJ posteriorly/inferiorly    Palpation: TTP at biceps tendon, supraspinatus, infraspinatus, UT    CMS Impairment/Limitation/Restriction for FOTO Survey    Therapist reviewed FOTO scores for Jessica Floyd on 2/15/2023.   FOTO documents entered into [employer] - see Media section.    Limitation Score: 60%  Predicted Goal: 33%    Category: Mobility     TREATMENT     Treatment Time In: 9:00am  Treatment Time Out: 9:15am  Total Treatment time separate from Evaluation: 15 minutes    Therapeutic Exercises were provided for 15 minutes to improve strength and AROM including:        Home Exercises and Patient Education Provided:    Education provided:   - Findings; prognosis and plan of care (POC)  - Home exercise program (HEP)  - Modality options  - Therapist contact information    Written Home Exercises Provided: Yes  Exercises were reviewed and Jessica was able to demonstrate them prior to the end of the session.  Jessica demonstrated good understanding of the education provided.        Assessment     Jessica is a 72 y.o. female referred to outpatient Physical Therapy with a medical diagnosis of R rotator cuff tear and GHJ arthritis. Pt presents to PT with pain, decreased R shoulder ROM, decreased strength and flexibility, poor posture, and functional deficits with reaching, lifting, pushing/pulling, sleeping, etc. These deficits are negatively impacting this patient's ability to complete their work duties and activities of daily living.     Pt prognosis is Fair.   Pt will benefit from skilled outpatient Physical Therapy to address the deficits stated above and in the chart below, provide pt/family education, and to maximize pt's level of independence.     Plan of care discussed with patient: Yes  Pt's spiritual, cultural and educational needs considered and pt agreeable to plan of care and goals as stated below:     Anticipated Barriers for therapy: None      Medical Necessity is demonstrated by the following  History  Co-morbidities and personal factors that may impact the plan of care Co-morbidities:   HTN    Personal Factors:   age     low   Examination  Body Structures and Functions, activity limitations and participation restrictions that may impact the plan of care Body Regions:   upper extremities    Body Systems:    ROM  strength    Participation Restrictions:   Walking    Activity limitations:   Learning and applying knowledge  no deficits    General Tasks and Commands  No Deficits    Communication  No Deficits    Mobility  lifting and carrying objects    Self care  washing oneself (bathing, drying, washing hands)  caring for body parts (brushing teeth, shaving, grooming)  dressing    Domestic Life  No Deficits    Interactions/Relationships  No Deficits    Life Areas  No Deficits    Community and Social Life  No Deficits         low   Clinical Presentation stable and uncomplicated low   Decision Making/ Complexity Score: low     GOALS:  GOALS: Short Term Goals:  4 weeks  1.Report  decreased R shoulder pain pain < / =  6/10  to increase tolerance for reaching  2. Increase PROM to 120 degrees R shoulder flexion   3. Increased strength by 1/3 MMT grade in R shoulder abduction to increase tolerance for ADL and work activities.  4. Pt to tolerate HEP to improve ROM and independence with ADL's    Long Term Goals: 8 weeks  1.Report decreased R shoulder pain  < / =  3 /10  to increase tolerance for lifting  2.Increase AROM to 150 deg R shoulder flexion  3.Increase strength to >/= 4/5 in R shoulder abduction to increase tolerance for ADL and work activities.  4. Pt goal: perform ADLs without shoulder pain.  5. Pt will have improved gcode of CJ (20-40% limited) on FOTO shoulder in order to demonstrate true functional improvement.      Plan     Plan of care Certification: 2/15/2023 to 4/12/2023    Outpatient Physical Therapy 2 times weekly for 8 weeks to include the following interventions: Therapeutic Exercises, Manual Therapeutic Technique, Neuromuscular Re Education, Therapeutic Activities. Modalities, Kinesiotape prn, and Functional Dry Needling as needed.    Vito Kaiser, PT,  DPT

## 2023-02-15 NOTE — PLAN OF CARE
[facility]  Physical Therapy Initial Evaluation    Name: Jessica Floyd  Clinic Number: [id number]    Therapy Diagnosis:   Encounter Diagnoses   Name Primary?    Traumatic complete tear of right rotator cuff, initial encounter     Localized primary osteoarthritis of right shoulder region      Physician: Armani Cai, KEYSHA    Physician Orders: Physical Therapy Evaluate and Treat  Medical Diagnosis from Referral:   S46.011A (ICD-10-CM) - Traumatic complete tear of right rotator cuff, initial encounter   M19.011 (ICD-10-CM) - Localized primary osteoarthritis of right shoulder region     Evaluation Date: 2/15/2023  Authorization Period Expiration: 2/6/2024  Plan of Care Expiration: 2/15/2023 to 4/12/2023  Visit # / Visits authorized: 1/1 (pending additional authorization following initial evaluation)   FOTO: 1/3  on 2/15/2023      Time In: 8:30am  Time Out: 9:15am  Total Billable Time: 45 minutes    Precautions: standard    Subjective     History of current condition - Jessica reports: the right shoulder had been hurting since about January when she was doing a lot of work to decorate her home.  She went to the ER about 2 weeks ago, she was sitting down on the toilet and it was lower than expected, she took a lot of weight through the shoulder and felt/heard a popping in the right shoulder.  Pt had imaging that showed R RTC tear and possible biceps tear.  States that the pain has not been getting much better since the tear.     Medical History:   Past Medical History:   Diagnosis Date    Allergic rhinitis     Amblyopia     Carotid stenosis     Coronary atherosclerosis     Outside [facility] 6/2014: 20% mLAD, 50% mCx, 20%, mRCA    Dyslipidemia     ESBL (extended spectrum beta-lactamase) producing bacteria infection     UTI    Hypertension     Nausea and vomiting 05/26/2017    Subarachnoid hemorrhage due to ruptured aneurysm 1999       Surgical History:   Jessica Floyd  has a past surgical history that  includes Anterior cruciate ligament repair (); Dental surgery; Cerebral aneurysm repair (); Tonsillectomy; Adenoidectomy; Appendectomy;  section; Cataract extraction w/  intraocular lens implant (Right, 2022); Cataract extraction w/  intraocular lens implant (Left, 2022); and Eye surgery (Bilateral).    Medications:   Jessica has a current medication list which includes the following prescription(s): allopurinol, amlodipine, amoxicillin, aspirin, atorvastatin, carvedilol, colchicine, fluticasone propionate, hydrochlorothiazide, hydrocortisone, ibuprofen, ketoconazole, lidocaine, methocarbamol, ondansetron, oxycodone-acetaminophen, pantoprazole, and promethazine, and the following Facility-Administered Medications: sodium hyaluronate (euflexxa).    Allergies:   Review of patient's allergies indicates:  No Known Allergies     Imaging: CT 2/3/2023  FINDINGS:  Rotator cuff: There is a large full-thickness rotator cuff tear involving the entire supra and infraspinatus tendons.  There is tendon retraction to the glenoid rim.  The teres minor and subscapularis are intact.  There is mild volume loss of both supra and infraspinatus muscles.  High-riding humeral head noted abutting the acromion.     Mild AC joint arthropathy.     The biceps tendon not identified, probably torn.     Generalized cartilage thinning/joint space narrowing noted with mild osteophytosis along the inferior margin of the humeral head.  No fractures or marrow replacement process.  No evidence of osteomyelitis.  No axillary lymphadenopathy.  Emphysematous changes noted in the right lung apex.  Impression:     Large full-thickness rotator cuff tear, involving the infraspinatus and supraspinatus tendons, as above.    Prior Therapy: yes for balance/strength/stamina  Social History: plays with granddaughter, yard work, cleaning  Prior Level of Function: no limitations  Current Level of Function: difficulty with reaching, lifting,  "dressing, bathing, cleaning, etc.    Pain:  Current 0/10, worst 10/10, best 0/10   Location: right shoulder , from anterior to posterior  Description: Aching, Dull, and Sharp  Aggravating Factors: Night Time, Lifting, reaching, dressing, bathing  Easing Factors: rest, ice, heat, tylenol    Pts goals: Pt would like to return to ADLs with no increase in shoulder pain.    Objective     WNL=within normal limits  WFL=within functional limits  NT=not tested  *=pain    Observation: ambulates with SPC in RUE    Posture: forward shoulders, forward head posture    Passive Range of Motion:   Shoulder Right Left   Flexion 85* 160   Abduction 110* 155   ER at 45 35*    ER at 90  75   IR 45* 55      Active Range of Motion:   Shoulder Right Left   Flexion 87* 125   Abduction 72* 80                              Strength:  Shoulder Right Left   Flexion 3+/5* 4/5   Abduction 3+/5* 4/5   ER 3/5* 4-/5   IR 4-/5* 4/5                      Joint Mobility: hypomobility R GHJ posteriorly/inferiorly    Palpation: TTP at biceps tendon, supraspinatus, infraspinatus, UT    CMS Impairment/Limitation/Restriction for FOTO Survey    Therapist reviewed FOTO scores for Jessica Floyd on 2/15/2023.   FOTO documents entered into [employer] - see Media section.    Limitation Score: 60%  Predicted Goal: 33%    Category: Mobility     TREATMENT     Treatment Time In: 9:00am  Treatment Time Out: 9:15am  Total Treatment time separate from Evaluation: 15 minutes    Therapeutic Exercises were provided for 15 minutes to improve strength and AROM including:  Shoulder isometrics flx/IR 10x5"  Shoulder pendulum circles x20 ea  Table slides flx/abd x20 ea    Home Exercises and Patient Education Provided:    Education provided:   - Findings; prognosis and plan of care (POC)  - Home exercise program (HEP)  - Modality options  - Therapist contact information    Written Home Exercises Provided: Yes  Exercises were reviewed and Jessica was able to demonstrate them prior to the " end of the session.  Jessica demonstrated good understanding of the education provided.       Assessment     Jessica is a 72 y.o. female referred to outpatient Physical Therapy with a medical diagnosis of R rotator cuff tear and GHJ arthritis. Pt presents to PT with pain, decreased R shoulder ROM, decreased strength and flexibility, poor posture, and functional deficits with reaching, lifting, pushing/pulling, sleeping, etc. These deficits are negatively impacting this patient's ability to complete their work duties and activities of daily living.     Pt prognosis is Fair.   Pt will benefit from skilled outpatient Physical Therapy to address the deficits stated above and in the chart below, provide pt/family education, and to maximize pt's level of independence.     Plan of care discussed with patient: Yes  Pt's spiritual, cultural and educational needs considered and pt agreeable to plan of care and goals as stated below:     Anticipated Barriers for therapy: None      Medical Necessity is demonstrated by the following  History  Co-morbidities and personal factors that may impact the plan of care Co-morbidities:   HTN    Personal Factors:   age     low   Examination  Body Structures and Functions, activity limitations and participation restrictions that may impact the plan of care Body Regions:   upper extremities    Body Systems:    ROM  strength    Participation Restrictions:   Walking    Activity limitations:   Learning and applying knowledge  no deficits    General Tasks and Commands  No Deficits    Communication  No Deficits    Mobility  lifting and carrying objects    Self care  washing oneself (bathing, drying, washing hands)  caring for body parts (brushing teeth, shaving, grooming)  dressing    Domestic Life  No Deficits    Interactions/Relationships  No Deficits    Life Areas  No Deficits    Community and Social Life  No Deficits         low   Clinical Presentation stable and uncomplicated low   Decision  Making/ Complexity Score: low     GOALS:  GOALS: Short Term Goals:  4 weeks  1.Report decreased R shoulder pain pain < / =  6/10  to increase tolerance for reaching  2. Increase PROM to 120 degrees R shoulder flexion   3. Increased strength by 1/3 MMT grade in R shoulder abduction to increase tolerance for ADL and work activities.  4. Pt to tolerate HEP to improve ROM and independence with ADL's    Long Term Goals: 8 weeks  1.Report decreased R shoulder pain  < / =  3 /10  to increase tolerance for lifting  2.Increase AROM to 150 deg R shoulder flexion  3.Increase strength to >/= 4/5 in R shoulder abduction to increase tolerance for ADL and work activities.  4. Pt goal: perform ADLs without shoulder pain.  5. Pt will have improved gcode of CJ (20-40% limited) on FOTO shoulder in order to demonstrate true functional improvement.      Plan     Plan of care Certification: 2/15/2023 to 4/12/2023    Outpatient Physical Therapy 2 times weekly for 8 weeks to include the following interventions: Therapeutic Exercises, Manual Therapeutic Technique, Neuromuscular Re Education, Therapeutic Activities. Modalities, Kinesiotape prn, and Functional Dry Needling as needed.    Vito Kaiser, PT,  DPT

## 2023-02-17 ENCOUNTER — HOSPITAL ENCOUNTER (EMERGENCY)
Facility: OTHER | Age: 73
Discharge: HOME | End: 2023-02-17
Attending: EMERGENCY MEDICINE
Payer: MEDICARE

## 2023-02-17 VITALS
HEIGHT: 63 IN | OXYGEN SATURATION: 98 % | TEMPERATURE: 97 F | DIASTOLIC BLOOD PRESSURE: 72 MMHG | BODY MASS INDEX: 24.27 KG/M2 | SYSTOLIC BLOOD PRESSURE: 147 MMHG | WEIGHT: 137 LBS | RESPIRATION RATE: 17 BRPM | HEART RATE: 82 BPM

## 2023-02-17 DIAGNOSIS — R04.0 RIGHT-SIDED EPISTAXIS: Primary | ICD-10-CM

## 2023-02-17 DIAGNOSIS — E87.6 HYPOKALEMIA: ICD-10-CM

## 2023-02-17 DIAGNOSIS — D64.9 ANEMIA, UNSPECIFIED TYPE: ICD-10-CM

## 2023-02-17 LAB
ANION GAP SERPL CALC-SCNC: 14 MMOL/L (ref 8–16)
BASOPHILS # BLD AUTO: 0.06 K/UL (ref 0–0.2)
BASOPHILS NFR BLD: 0.7 % (ref 0–1.9)
BUN SERPL-MCNC: 20 MG/DL (ref 8–23)
CALCIUM SERPL-MCNC: 9.2 MG/DL (ref 8.7–10.5)
CHLORIDE SERPL-SCNC: 97 MMOL/L (ref 95–110)
CO2 SERPL-SCNC: 26 MMOL/L (ref 23–29)
CREAT SERPL-MCNC: 1 MG/DL (ref 0.5–1.4)
DIFFERENTIAL METHOD: ABNORMAL
EOSINOPHIL # BLD AUTO: 0.2 K/UL (ref 0–0.5)
EOSINOPHIL NFR BLD: 2.6 % (ref 0–8)
ERYTHROCYTE [DISTWIDTH] IN BLOOD BY AUTOMATED COUNT: 15.8 % (ref 11.5–14.5)
EST. GFR  (NO RACE VARIABLE): 59 ML/MIN/1.73 M^2
GLUCOSE SERPL-MCNC: 103 MG/DL (ref 70–110)
HCT VFR BLD AUTO: 34.5 % (ref 37–48.5)
HGB BLD-MCNC: 11.3 G/DL (ref 12–16)
IMM GRANULOCYTES # BLD AUTO: 0.05 K/UL (ref 0–0.04)
IMM GRANULOCYTES NFR BLD AUTO: 0.5 % (ref 0–0.5)
LYMPHOCYTES # BLD AUTO: 1.7 K/UL (ref 1–4.8)
LYMPHOCYTES NFR BLD: 18.2 % (ref 18–48)
MCH RBC QN AUTO: 31.9 PG (ref 27–31)
MCHC RBC AUTO-ENTMCNC: 32.8 G/DL (ref 32–36)
MCV RBC AUTO: 98 FL (ref 82–98)
MONOCYTES # BLD AUTO: 1.3 K/UL (ref 0.3–1)
MONOCYTES NFR BLD: 14.6 % (ref 4–15)
NEUTROPHILS # BLD AUTO: 5.8 K/UL (ref 1.8–7.7)
NEUTROPHILS NFR BLD: 63.4 % (ref 38–73)
NRBC BLD-RTO: 0 /100 WBC
PLATELET # BLD AUTO: 309 K/UL (ref 150–450)
PMV BLD AUTO: 10.3 FL (ref 9.2–12.9)
POTASSIUM SERPL-SCNC: 2.9 MMOL/L (ref 3.5–5.1)
RBC # BLD AUTO: 3.54 M/UL (ref 4–5.4)
SODIUM SERPL-SCNC: 137 MMOL/L (ref 136–145)
WBC # BLD AUTO: 9.19 K/UL (ref 3.9–12.7)

## 2023-02-17 PROCEDURE — 85025 COMPLETE CBC W/AUTO DIFF WBC: CPT | Mod: HCNC | Performed by: NURSE PRACTITIONER

## 2023-02-17 PROCEDURE — 25000003 PHARM REV CODE 250: Mod: HCNC | Performed by: NURSE PRACTITIONER

## 2023-02-17 PROCEDURE — 25000003 PHARM REV CODE 250: Mod: HCNC | Performed by: PHYSICIAN ASSISTANT

## 2023-02-17 PROCEDURE — 80048 BASIC METABOLIC PNL TOTAL CA: CPT | Mod: HCNC | Performed by: NURSE PRACTITIONER

## 2023-02-17 PROCEDURE — 99283 EMERGENCY DEPT VISIT LOW MDM: CPT | Mod: 25,HCNC

## 2023-02-17 PROCEDURE — 30901 CONTROL OF NOSEBLEED: CPT | Mod: HCNC,RT

## 2023-02-17 RX ORDER — POTASSIUM CHLORIDE 20 MEQ/1
40 TABLET, EXTENDED RELEASE ORAL
Status: COMPLETED | OUTPATIENT
Start: 2023-02-17 | End: 2023-02-17

## 2023-02-17 RX ORDER — OXYMETAZOLINE HCL 0.05 %
1 SPRAY, NON-AEROSOL (ML) NASAL
Status: COMPLETED | OUTPATIENT
Start: 2023-02-17 | End: 2023-02-17

## 2023-02-17 RX ORDER — ACETAMINOPHEN 500 MG
1000 TABLET ORAL
Status: COMPLETED | OUTPATIENT
Start: 2023-02-17 | End: 2023-02-17

## 2023-02-17 RX ORDER — POTASSIUM CHLORIDE 750 MG/1
10 TABLET, EXTENDED RELEASE ORAL DAILY
Qty: 5 TABLET | Refills: 0 | Status: SHIPPED | OUTPATIENT
Start: 2023-02-17 | End: 2023-02-22

## 2023-02-17 RX ADMIN — POTASSIUM CHLORIDE 40 MEQ: 1500 TABLET, EXTENDED RELEASE ORAL at 12:02

## 2023-02-17 RX ADMIN — Medication 1 SPRAY: at 12:02

## 2023-02-17 RX ADMIN — ACETAMINOPHEN 1000 MG: 500 TABLET, FILM COATED ORAL at 12:02

## 2023-02-17 NOTE — ED NOTES
NOSE CLAMP:   Pt sitting in tele-triage in front of ipad; pt awaiting for provider to come online. Pt holding her nose to control bleeding.  Tissue saturated with blood pt holding up to her nose. Had pt  Immediately remove tissue paper and stop holding pressure to her nose.  Immediately right nasal passage began oozing blood. Applied nose clamp (two tongue blades held together with silk tape). Pt instructed to keep head still as much as possible and not swallow any blood. Pt encourage to expectorate any blood  dripping into emesis bag. Pt verbalized understanding. Pt tolerating well. Pt instructed nurse will bring  her to an exam room once it is available. Will continue to monitor.

## 2023-02-17 NOTE — FIRST PROVIDER EVALUATION
Emergency Department TeleTriage Encounter Note      CHIEF COMPLAINT    Chief Complaint   Patient presents with    Epistaxis     C/o nose bleed that began this am. -trauma/injury. ASA 81 mg daily only. Bleeding to right nostril noted. Denies any other symptoms. VSS        VITAL SIGNS   Initial Vitals [02/17/23 1107]   BP Pulse Resp Temp SpO2   (!) 164/79 88 17 97.5 °F (36.4 °C) 97 %      MAP       --            ALLERGIES    Review of patient's allergies indicates:  No Known Allergies    PROVIDER TRIAGE NOTE  This is a teletriage evaluation of a 72 y.o. female presenting to the ED complaining of epistaxis starting today- right side. No trauma. Reports on ASA daily.      Bleeding appears minimal at this time.       Initial orders will be placed and care will be transferred to an alternate provider when patient is roomed for a full evaluation. Any additional orders and the final disposition will be determined by that provider.         ORDERS  Labs Reviewed   CBC W/ AUTO DIFFERENTIAL   BASIC METABOLIC PANEL       ED Orders (720h ago, onward)      Start Ordered     Status Ordering Provider    02/17/23 1130 02/17/23 1121  oxymetazoline 0.05 % nasal spray 1 spray  ED 1 Time         Ordered DAX AMANDA N.    02/17/23 1121 02/17/23 1121  CBC auto differential  STAT         Ordered DAX AMANDA N.    02/17/23 1121 02/17/23 1121  Insert Saline lock IV  Once         Ordered DAX AMANDA N.    02/17/23 1121 02/17/23 1121  Basic metabolic panel  STAT         Ordered DAX AMANDA              Virtual Visit Note: The provider triage portion of this emergency department evaluation and documentation was performed via [employer], a HIPAA-compliant telemedicine application, in concert with a tele-presenter in the room. A face to face patient evaluation with one of my colleagues will occur once the patient is placed in an emergency department room.      DISCLAIMER: This note was prepared  with M*Modal voice recognition transcription software. Garbled syntax, mangled pronouns, and other bizarre constructions may be attributed to that software system.

## 2023-02-17 NOTE — ED NOTES
Nose clamp removed by Jose Armando Alatorre PA-C. Provider states bleeding controlled at this time.

## 2023-02-17 NOTE — ED NOTES
Sitting in w/c in room 17. AAOx3. Skin is warm  and dry. Resp:18 even and unlabored. No oozing blood noted. Discussed with patient she will blow her nose until clear to remove any blood clots per Jose Armando Alatorre PA-C. Pt blew her nose 3 times and was able to breathe through her R side of nose. Saturated 2x2 gauze into R side of nose and applied nose clamp. Pt with emesis bag in her hand. Pt informed will leave nose clamp on for 15-20 mins and provider will reassess R side of nose. Pt verbalized understanding. Informed to call nurse for any pain/discomfort , assistance or  nose starts to ooze blood. Daughter at BS. Call light within reach.. Will continue to monitor.

## 2023-02-17 NOTE — ED PROVIDER NOTES
SCRIBE #1 NOTE: I, Kasie Currie, am scribing for, and in the presence of,  Jose Armando Alatorre PA-C. I have scribed the following portions of the note - Other sections scribed: HPI, PE.       Source of History:  Patient, daughter    Chief complaint:  Epistaxis (C/o nose bleed that began this am. -trauma/injury. ASA 81 mg daily only. Bleeding to right nostril noted. Denies any other symptoms. VSS )      HPI:  Jessica Floyd is a 72 y.o. female presenting with epistaxis onset 1 hour PTA. Her daughter states that they were sitting down when her mother's nose began to bleed lightly at first and then profusely. Daughter estimates that profuse bleeding with noted clots continued for 25 minutes and estimates that her mother lost 0.25-0.5 cups of blood. Patient states that she takes baby aspirin daily. Patient reports that she is experiencing a mild headache and lightheadedness. She states that she had an episode of epistaxis yesterday that she was able to get under control quickly. She denies previous blood transfusions or anemia.   This is the extent to the patients complaints today here in the emergency department.    ROS: As per HPI    Review of patient's allergies indicates:  No Known Allergies    PMH:  As per HPI and below:  Past Medical History:   Diagnosis Date    Allergic rhinitis     Amblyopia     Carotid stenosis     Coronary atherosclerosis     Outside [facility] 6/2014: 20% mLAD, 50% mCx, 20%, mRCA    Dyslipidemia     ESBL (extended spectrum beta-lactamase) producing bacteria infection     UTI    Hypertension     Nausea and vomiting 05/26/2017    Subarachnoid hemorrhage due to ruptured aneurysm 1999     Past Surgical History:   Procedure Laterality Date    ADENOIDECTOMY      ANTERIOR CRUCIATE LIGAMENT REPAIR  2012    APPENDECTOMY      CATARACT EXTRACTION W/  INTRAOCULAR LENS IMPLANT Right 11/07/2022    Procedure: EXTRACTION, CATARACT, WITH IOL INSERTION;  Surgeon: Higinio Cristina MD;  Location: [facility] OR;  Service:  "Ophthalmology;  Laterality: Right;    CATARACT EXTRACTION W/  INTRAOCULAR LENS IMPLANT Left 2022    Procedure: EXTRACTION, CATARACT, WITH IOL INSERTION;  Surgeon: Higinio Cristina MD;  Location: [facility];  Service: Ophthalmology;  Laterality: Left;    CEREBRAL ANEURYSM REPAIR      clip     SECTION      DENTAL SURGERY      EYE SURGERY Bilateral     cataract removal and lens implants    TONSILLECTOMY         Social History     Tobacco Use    Smoking status: Former     Packs/day: 0.50     Years: 20.00     Pack years: 10.00     Types: Cigarettes     Quit date: 1999     Years since quittin.1    Smokeless tobacco: Never   Substance Use Topics    Alcohol use: Yes     Alcohol/week: 7.0 standard drinks     Types: 7 Glasses of wine per week    Drug use: No       Physical Exam:    BP (!) 164/79 (BP Location: Left arm, Patient Position: Sitting)   Pulse 88   Temp 97.5 °F (36.4 °C) (Oral)   Resp 17   Ht 5' 3" (1.6 m)   Wt 62.1 kg (137 lb)   SpO2 97%   BMI 24.27 kg/m²   Nursing note and vital signs reviewed.  Appearance: Non toxic. No distress   Eyes: No conjunctival injection.  ENT: .Wet blood noted to right nare. Blood clot near nasal septum. No evidence of posterior bleeding. No active bleeding. Oropharynx clear.    Musculoskeletal: Good range of motion all joints.  No deformities.  Neck supple.  No meningismus.  Skin: No rashes seen.  Good turgor.  No abrasions.  No ecchymoses.  Neurologic: Motor intact.  Sensation intact.   Mental Status:  Alert and oriented x 3.  Appropriate, conversant.     Labs that have been ordered have been independently reviewed and interpreted by myself.    I decided to obtain the patient's medical records.    MDM/ Differential Dx:    72 y.o. female who is presenting to the emergency department with atraumatic epistaxis.  Patient is afebrile, nontoxic appearing hemodynamically stable.  After compression applied prior to my eval for approximately 20 minutes, bleeding has " subsided.  Will plan to see if clot dislodged is by blowing and apply Afrin gauze.     ED Course as of 02/17/23 1335   Fri Feb 17, 2023   1328 1:28 PM  30 minutes after receiving Afrin, no further nosebleed. Ready to go home.  [AB]   1331 CBC with stable anemia.  Patient no further episodes of bleeding here in the ED to warrant repeat CBC  Chemistry with potassium of 2.9.  Patient given oral repletion.  Likely from thiazide sent.  Will send home with 5 day course of potassium supplement.  Patient advised to have repeat labs in 1 week. [AG]      ED Course User Index  [AB] Kasie Currie  [AG] Jose Armando Alatorre PA-C     I, Kasie Currie, scribed for, and in the presence of, Jose Armando Alatorre PA-C. I performed the scribed service and the documentation accurately describes the services I performed. I attest to the accuracy of the note.     Provider Attestation for Scribe: I, Jose Armando Alatorre, reviewed documentation as scribed in my presence, which is both accurate and complete.          Diagnostic Impression:    1. Right-sided epistaxis    2. Hypokalemia    3. Anemia, unspecified type                   Jose Armando Alatorre PA-C  02/17/23 8462

## 2023-02-17 NOTE — ED TRIAGE NOTES
72 YOF presents to ED with c/o nose bleed that began this am. -trauma/injury. ASA 81 mg daily only. Dry blood noted to right nostril. Bleeding currently controlled. Denies any other symptoms. A&Ox4. Denies any other complaints.     LOC: The patient is awake, alert and aware of environment with an appropriate affect, the patient is oriented x 3.  APPEARANCE: Patient resting comfortably and in no acute distress, patient is clean and well groomed, patient's clothing is properly fastened.  SKIN: The skin is warm and dry, patient has normal skin turgor and moist mucus membranes, skin intact, no breakdown or brusing noted.  MUSKULOSKELETAL: Patient moving all extremities well, no obvious swelling or deformities noted.  RESPIRATORY: Airway is open and patent, respirations are spontaneous, patient has a normal effort and rate.  CARDIAC: No peripheral edema.  ABDOMEN: Soft and no tenderness to palpation, no distention noted.     ED workup in progress. VSS. Safety measures in place; side rails up x2. Call light within pt reach. Will continue to monitor.

## 2023-02-17 NOTE — ED NOTES
Gauze removed from R side of nose and nose clamp removed. No oozing of blood noted. Pt tolerated well. Jose Armando Mensah, PAC reassessing R nare. Pt informed by provider will monitor for 30 mins to monitor for any nasal bleeding. Pt verbalized understanding.

## 2023-02-18 ENCOUNTER — HOSPITAL ENCOUNTER (EMERGENCY)
Facility: OTHER | Age: 73
Discharge: HOME | End: 2023-02-18
Attending: EMERGENCY MEDICINE
Payer: MEDICARE

## 2023-02-18 ENCOUNTER — NURSE TRIAGE (OUTPATIENT)
Dept: ADMINISTRATIVE | Facility: CLINIC | Age: 73
End: 2023-02-18
Payer: MEDICARE

## 2023-02-18 VITALS
OXYGEN SATURATION: 97 % | HEIGHT: 63 IN | BODY MASS INDEX: 24.27 KG/M2 | RESPIRATION RATE: 16 BRPM | SYSTOLIC BLOOD PRESSURE: 138 MMHG | HEART RATE: 91 BPM | WEIGHT: 137 LBS | DIASTOLIC BLOOD PRESSURE: 69 MMHG | TEMPERATURE: 98 F

## 2023-02-18 VITALS
SYSTOLIC BLOOD PRESSURE: 138 MMHG | DIASTOLIC BLOOD PRESSURE: 67 MMHG | BODY MASS INDEX: 24.27 KG/M2 | OXYGEN SATURATION: 97 % | WEIGHT: 137 LBS | TEMPERATURE: 98 F | HEIGHT: 63 IN | RESPIRATION RATE: 19 BRPM | HEART RATE: 77 BPM

## 2023-02-18 DIAGNOSIS — R04.0 RIGHT-SIDED EPISTAXIS: Primary | ICD-10-CM

## 2023-02-18 DIAGNOSIS — R04.0 ACUTE ANTERIOR EPISTAXIS: Primary | ICD-10-CM

## 2023-02-18 PROCEDURE — 99283 EMERGENCY DEPT VISIT LOW MDM: CPT | Mod: 27,HCNC

## 2023-02-18 PROCEDURE — 25000003 PHARM REV CODE 250: Mod: HCNC | Performed by: EMERGENCY MEDICINE

## 2023-02-18 PROCEDURE — 30901 CONTROL OF NOSEBLEED: CPT | Mod: HCNC,RT

## 2023-02-18 PROCEDURE — 99283 EMERGENCY DEPT VISIT LOW MDM: CPT | Mod: 25,HCNC

## 2023-02-18 PROCEDURE — 30901 CONTROL OF NOSEBLEED: CPT | Mod: HCNC

## 2023-02-18 RX ORDER — LIDOCAINE HYDROCHLORIDE 20 MG/ML
5 INJECTION, SOLUTION INFILTRATION; PERINEURAL
Status: DISCONTINUED | OUTPATIENT
Start: 2023-02-18 | End: 2023-02-18 | Stop reason: HOSPADM

## 2023-02-18 RX ORDER — LORAZEPAM 0.5 MG/1
0.5 TABLET ORAL
Status: COMPLETED | OUTPATIENT
Start: 2023-02-18 | End: 2023-02-18

## 2023-02-18 RX ORDER — OXYMETAZOLINE HCL 0.05 %
1 SPRAY, NON-AEROSOL (ML) NASAL
Status: COMPLETED | OUTPATIENT
Start: 2023-02-18 | End: 2023-02-18

## 2023-02-18 RX ORDER — ONDANSETRON 4 MG/1
4 TABLET, ORALLY DISINTEGRATING ORAL EVERY 8 HOURS PRN
Qty: 12 TABLET | Refills: 0 | Status: SHIPPED | OUTPATIENT
Start: 2023-02-18 | End: 2023-03-02

## 2023-02-18 RX ORDER — ACETAMINOPHEN 500 MG
1000 TABLET ORAL
Status: COMPLETED | OUTPATIENT
Start: 2023-02-18 | End: 2023-02-18

## 2023-02-18 RX ORDER — ONDANSETRON 4 MG/1
4 TABLET, ORALLY DISINTEGRATING ORAL
Status: COMPLETED | OUTPATIENT
Start: 2023-02-18 | End: 2023-02-18

## 2023-02-18 RX ADMIN — LORAZEPAM 0.5 MG: 0.5 TABLET ORAL at 11:02

## 2023-02-18 RX ADMIN — ONDANSETRON 4 MG: 4 TABLET, ORALLY DISINTEGRATING ORAL at 10:02

## 2023-02-18 RX ADMIN — NASAL DECONGESTANT 1 SPRAY: 0.05 SPRAY NASAL at 06:02

## 2023-02-18 RX ADMIN — ACETAMINOPHEN 1000 MG: 500 TABLET, FILM COATED ORAL at 10:02

## 2023-02-18 RX ADMIN — Medication 1 SPRAY: at 05:02

## 2023-02-18 NOTE — ED NOTES
Pt reports ER doctor has given her instructions on what to do at home, currently no active nosebleed,  at bedside

## 2023-02-18 NOTE — ED NOTES
A&Ox3, in no distress, resp even non labored skin dry, warm reports nose bleed to R nare on Friday, pt was seen and treated in ER and symptoms resolved, pt then reports current nose bleed to R nare started at approx 0400 this am, at this time, noted gauze in R nare, bleeding controlled, pt denies pain, pt reports awakening up this am, opening up the door to backyard and once cold air hit her face, nose bleed started, denies trauma, ROM intact to all extremities

## 2023-02-18 NOTE — ED PROVIDER NOTES
"Encounter Date: 2/18/2023    SCRIBE #1 NOTE: I, Nguyễn Khan, am scribing for, and in the presence of,  Geraldo Ocasio MD. I have scribed the following portions of the note - Other sections scribed: HPI, ROS, PE.     History     Chief Complaint   Patient presents with    Epistaxis     Pt's nose began bleeding around 0430hrs and it has not stopped since     Jessica Floyd is a 72 y.o. female with a PMHx of Carotid Stenosis, HTN, and Subarachnoid hemorrhage due to ruptured aneurysm who presents to the ED for evaluation of epistaxis beginning at 4 AM. Patient reports that she was seen here yesterday for nosebleeds as well. She says that her nose is bleeding predominantly in her right nostril. She reports that she takes a baby aspirin "occasionally" but not daily. Patient denies any fever, congestion, eye redness, SOB, CP, abdominal pain, dysuria, rash, headaches, or confusion. No other complaints.    The history is provided by the patient. No  was used.   Review of patient's allergies indicates:  No Known Allergies  Past Medical History:   Diagnosis Date    Allergic rhinitis     Amblyopia     Carotid stenosis     Coronary atherosclerosis     Outside [facility] 6/[facility]: 20% mLAD, 50% mCx, 20%, mRCA    Dyslipidemia     ESBL (extended spectrum beta-lactamase) producing bacteria infection     UTI    Hypertension     Nausea and vomiting 05/26/2017    Subarachnoid hemorrhage due to ruptured aneurysm 1999     Past Surgical History:   Procedure Laterality Date    ADENOIDECTOMY      ANTERIOR CRUCIATE LIGAMENT REPAIR  2012    APPENDECTOMY      CATARACT EXTRACTION W/  INTRAOCULAR LENS IMPLANT Right 11/07/2022    Procedure: EXTRACTION, CATARACT, WITH IOL INSERTION;  Surgeon: Higinio Cristina MD;  Location: [facility];  Service: Ophthalmology;  Laterality: Right;    CATARACT EXTRACTION W/  INTRAOCULAR LENS IMPLANT Left 11/21/2022    Procedure: EXTRACTION, CATARACT, WITH IOL INSERTION;  Surgeon: Higinio Cristina MD;  " [facility];  Service: Ophthalmology;  Laterality: Left;    CEREBRAL ANEURYSM REPAIR      clip     SECTION      DENTAL SURGERY      EYE SURGERY Bilateral     cataract removal and lens implants    TONSILLECTOMY       Family History   Problem Relation Age of Onset    Hypertension Mother     Aneurysm Mother     Hypertension Father     Alcohol abuse Father     Kidney disease Brother     Heart disease Brother     Gout Brother     No Known Problems Daughter     No Known Problems Daughter     No Known Problems Daughter      Social History     Tobacco Use    Smoking status: Former     Packs/day: 0.50     Years: 20.00     Pack years: 10.00     Types: Cigarettes     Quit date: 1999     Years since quittin.1    Smokeless tobacco: Never   Substance Use Topics    Alcohol use: Yes     Alcohol/week: 7.0 standard drinks     Types: 7 Glasses of wine per week    Drug use: No     Review of Systems   Constitutional:  Negative for fever.   HENT:  Positive for nosebleeds. Negative for congestion.    Eyes:  Negative for redness.   Respiratory:  Negative for shortness of breath.    Cardiovascular:  Negative for chest pain.   Gastrointestinal:  Negative for abdominal pain.   Genitourinary:  Negative for dysuria.   Skin:  Negative for rash.   Neurological:  Negative for headaches.   Psychiatric/Behavioral:  Negative for confusion.    All other systems reviewed and are negative.    Physical Exam     Initial Vitals [23 0505]   BP Pulse Resp Temp SpO2   131/75 88 16 98.4 °F (36.9 °C) 99 %      MAP       --         Physical Exam    Nursing note and vitals reviewed.  Constitutional: She appears well-developed and well-nourished. No distress.   No pallor or icterus.   HENT:   Head: Normocephalic and atraumatic.   Brisk bleeding from right anterior bean. Culprit vessel unable to be identified.    Eyes: Conjunctivae are normal.   Neck: Neck supple. No thyromegaly present. No tracheal deviation present. No JVD  present.   Normal range of motion.  Pulmonary/Chest: No stridor.   Musculoskeletal:         General: No edema.      Cervical back: Normal range of motion and neck supple.     Lymphadenopathy:     She has no cervical adenopathy.   Neurological: She is alert and oriented to person, place, and time.   Skin: Skin is warm and dry.   Psychiatric: She has a normal mood and affect.       ED Course   Procedures  Labs Reviewed - No data to display       Patient presents with recurrence of right anterior nosebleed, seen for the same yesterday.  Was given Afrin, pressure applied prior to my evaluation, continuing to blood.  A 2nd round of Afrin and pressure was performed with some improvement, followed by packing with lidocaine with epinephrine soaked gauze with good resolution.  I have provided the patient with the remainder of the Afrin, as well as the lidocaine with epinephrine, 4 by 4s, and have given instructions on how she may use Afrin followed by application of lidocaine soaked gauze packing if she should have further recurrence at home.  Also encouraged follow-up with an ENT clinic, especially given recurrence.    Imaging Results    None          Medications   oxymetazoline 0.05 % nasal spray 1 spray (1 spray Each Nostril Given 2/18/23 7931)   oxymetazoline 0.05 % nasal spray 1 spray (1 spray Each Nostril Given 2/18/23 4192)              Scribe Attestation:   Scribe #1: I performed the above scribed service and the documentation accurately describes the services I performed. I attest to the accuracy of the note.            I, [facility], personally performed the services described in this documentation. All medical record entries made by the scribe were at my direction and in my presence. I have reviewed the chart and agree that the record reflects my personal performance and is accurate and complete.        Clinical Impression:   Final diagnoses:  [R04.0] Acute anterior epistaxis (Primary)        ED Disposition Condition     Discharge Stable          ED Prescriptions    None       Follow-up Information       Follow up With Specialties Details Why Contact Info    [facility] Otolaryngology In 3 days  [address] [facility] [address]  [phone]    Ar Zaragoza MD Otolaryngology In 3 days  [address]  [facility] [address]  [phone]               Geraldo Ocasio II, MD  02/18/23 1120       Geraldo Ocasio II, MD  02/18/23 1120

## 2023-02-18 NOTE — ED NOTES
Nurse Brigid reports she assisted md at bedside with bleeding control of nose bleed, dr. Ocasio was at bedside

## 2023-02-19 ENCOUNTER — HOSPITAL ENCOUNTER (EMERGENCY)
Facility: HOSPITAL | Age: 73
Discharge: HOME | End: 2023-02-19
Attending: EMERGENCY MEDICINE
Payer: MEDICARE

## 2023-02-19 VITALS
BODY MASS INDEX: 24.27 KG/M2 | TEMPERATURE: 98 F | DIASTOLIC BLOOD PRESSURE: 69 MMHG | HEART RATE: 80 BPM | WEIGHT: 137 LBS | OXYGEN SATURATION: 100 % | RESPIRATION RATE: 16 BRPM | SYSTOLIC BLOOD PRESSURE: 129 MMHG

## 2023-02-19 DIAGNOSIS — R04.0 EPISTAXIS: Primary | ICD-10-CM

## 2023-02-19 LAB
BASOPHILS # BLD AUTO: 0.06 K/UL (ref 0–0.2)
BASOPHILS NFR BLD: 0.6 % (ref 0–1.9)
DIFFERENTIAL METHOD: ABNORMAL
EOSINOPHIL # BLD AUTO: 0.2 K/UL (ref 0–0.5)
EOSINOPHIL NFR BLD: 1.6 % (ref 0–8)
ERYTHROCYTE [DISTWIDTH] IN BLOOD BY AUTOMATED COUNT: 16 % (ref 11.5–14.5)
HCT VFR BLD AUTO: 29.3 % (ref 37–48.5)
HGB BLD-MCNC: 9.4 G/DL (ref 12–16)
IMM GRANULOCYTES # BLD AUTO: 0.04 K/UL (ref 0–0.04)
IMM GRANULOCYTES NFR BLD AUTO: 0.4 % (ref 0–0.5)
LYMPHOCYTES # BLD AUTO: 1.4 K/UL (ref 1–4.8)
LYMPHOCYTES NFR BLD: 15 % (ref 18–48)
MCH RBC QN AUTO: 32.1 PG (ref 27–31)
MCHC RBC AUTO-ENTMCNC: 32.1 G/DL (ref 32–36)
MCV RBC AUTO: 100 FL (ref 82–98)
MONOCYTES # BLD AUTO: 1.4 K/UL (ref 0.3–1)
MONOCYTES NFR BLD: 15 % (ref 4–15)
NEUTROPHILS # BLD AUTO: 6.4 K/UL (ref 1.8–7.7)
NEUTROPHILS NFR BLD: 67.4 % (ref 38–73)
NRBC BLD-RTO: 0 /100 WBC
PLATELET # BLD AUTO: 267 K/UL (ref 150–450)
PMV BLD AUTO: 10.8 FL (ref 9.2–12.9)
RBC # BLD AUTO: 2.93 M/UL (ref 4–5.4)
WBC # BLD AUTO: 9.46 K/UL (ref 3.9–12.7)

## 2023-02-19 PROCEDURE — 99284 EMERGENCY DEPT VISIT MOD MDM: CPT | Mod: 25,,, | Performed by: EMERGENCY MEDICINE

## 2023-02-19 PROCEDURE — 30901 PR CTRL 2SEBLEED,ANTER,SIMPLE: ICD-10-PCS | Mod: RT,,, | Performed by: EMERGENCY MEDICINE

## 2023-02-19 PROCEDURE — 25000003 PHARM REV CODE 250: Mod: HCNC

## 2023-02-19 PROCEDURE — 85025 COMPLETE CBC W/AUTO DIFF WBC: CPT | Mod: HCNC

## 2023-02-19 PROCEDURE — 30901 CONTROL OF NOSEBLEED: CPT | Mod: RT,,, | Performed by: EMERGENCY MEDICINE

## 2023-02-19 PROCEDURE — 30901 CONTROL OF NOSEBLEED: CPT | Mod: RT

## 2023-02-19 PROCEDURE — 99284 PR EMERGENCY DEPT VISIT,LEVEL IV: ICD-10-PCS | Mod: 25,,, | Performed by: EMERGENCY MEDICINE

## 2023-02-19 PROCEDURE — 99283 EMERGENCY DEPT VISIT LOW MDM: CPT | Mod: HCNC,25

## 2023-02-19 RX ORDER — CEPHALEXIN 500 MG/1
500 CAPSULE ORAL EVERY 8 HOURS
Qty: 20 CAPSULE | Refills: 0 | Status: SHIPPED | OUTPATIENT
Start: 2023-02-19 | End: 2023-02-24

## 2023-02-19 RX ORDER — OXYMETAZOLINE HCL 0.05 %
1 SPRAY, NON-AEROSOL (ML) NASAL
Status: COMPLETED | OUTPATIENT
Start: 2023-02-19 | End: 2023-02-19

## 2023-02-19 RX ORDER — HYDROCODONE BITARTRATE AND ACETAMINOPHEN 5; 325 MG/1; MG/1
1 TABLET ORAL EVERY 6 HOURS PRN
Qty: 12 TABLET | Refills: 0 | Status: SHIPPED | OUTPATIENT
Start: 2023-02-19 | End: 2023-03-02

## 2023-02-19 RX ORDER — ALPRAZOLAM 0.25 MG/1
0.25 TABLET ORAL 3 TIMES DAILY PRN
Qty: 3 TABLET | Refills: 0 | Status: CANCELLED | OUTPATIENT
Start: 2023-02-19 | End: 2023-02-22

## 2023-02-19 RX ORDER — ALPRAZOLAM 0.25 MG/1
1 TABLET ORAL
Status: COMPLETED | OUTPATIENT
Start: 2023-02-19 | End: 2023-02-19

## 2023-02-19 RX ADMIN — ALPRAZOLAM 1 MG: 0.25 TABLET ORAL at 11:02

## 2023-02-19 RX ADMIN — OXYMETAZOLINE HCL 1 SPRAY: 0.05 SPRAY NASAL at 11:02

## 2023-02-19 NOTE — ED PROVIDER NOTES
Encounter Date: 2023       History     Chief Complaint   Patient presents with    Epistaxis     Pt began to have a nose bleed around 1800hrs - this is an on going issue over the past 2 days      Patient is a 72-year-old female who presents to the ED with chief complaint of right-sided nasal bleeding.  This is patient's 3rd visit within 48 hours for nasal bleeding.  Patient states that she sneezed and the bleeding had started again this evening.  Patient had packed her nose with lidocaine gauze and came into the ED for further evaluation.  Patient is frustrated with the current situation and is requesting to see ENT.     Review of patient's allergies indicates:  No Known Allergies  Past Medical History:   Diagnosis Date    Allergic rhinitis     Amblyopia     Carotid stenosis     Coronary atherosclerosis     Outside [facility] 2014: 20% mLAD, 50% mCx, 20%, mRCA    Dyslipidemia     ESBL (extended spectrum beta-lactamase) producing bacteria infection     UTI    Hypertension     Nausea and vomiting 2017    Subarachnoid hemorrhage due to ruptured aneurysm      Past Surgical History:   Procedure Laterality Date    ADENOIDECTOMY      ANTERIOR CRUCIATE LIGAMENT REPAIR      APPENDECTOMY      CATARACT EXTRACTION W/  INTRAOCULAR LENS IMPLANT Right 2022    Procedure: EXTRACTION, CATARACT, WITH IOL INSERTION;  Surgeon: Higinio Cristina MD;  Location: [facility] OR;  Service: Ophthalmology;  Laterality: Right;    CATARACT EXTRACTION W/  INTRAOCULAR LENS IMPLANT Left 2022    Procedure: EXTRACTION, CATARACT, WITH IOL INSERTION;  Surgeon: Higinio Cristina MD;  Location: [facility] OR;  Service: Ophthalmology;  Laterality: Left;    CEREBRAL ANEURYSM REPAIR      clip     SECTION      DENTAL SURGERY      EYE SURGERY Bilateral     cataract removal and lens implants    TONSILLECTOMY       Family History   Problem Relation Age of Onset    Hypertension Mother     Aneurysm Mother     Hypertension Father     Alcohol  abuse Father     Kidney disease Brother     Heart disease Brother     Gout Brother     No Known Problems Daughter     No Known Problems Daughter     No Known Problems Daughter      Social History     Tobacco Use    Smoking status: Former     Packs/day: 0.50     Years: 20.00     Pack years: 10.00     Types: Cigarettes     Quit date: 1999     Years since quittin.2    Smokeless tobacco: Never   Substance Use Topics    Alcohol use: Yes     Alcohol/week: 7.0 standard drinks     Types: 7 Glasses of wine per week    Drug use: No     Review of Systems   Constitutional:  Negative for chills and fever.   HENT:  Positive for nosebleeds. Negative for congestion and sore throat.    Eyes:  Negative for photophobia and redness.   Respiratory:  Negative for cough and shortness of breath.    Cardiovascular:  Negative for chest pain.   Gastrointestinal:  Positive for nausea. Negative for abdominal pain and vomiting.   Genitourinary:  Negative for dysuria.   Musculoskeletal:  Negative for back pain.   Skin:  Negative for rash.   Neurological:  Negative for weakness, light-headedness and headaches.   Psychiatric/Behavioral:  Negative for confusion.      Physical Exam     Initial Vitals [23 2140]   BP Pulse Resp Temp SpO2   (!) 153/80 99 16 98.1 °F (36.7 °C) 98 %      MAP       --         Physical Exam    Constitutional: She appears well-developed and well-nourished. She is not diaphoretic.   HENT:   Head: Normocephalic and atraumatic.   Mouth/Throat: Oropharynx is clear and moist.   Positive nasal packing to right Wilson.   Cardiovascular:  Normal rate, regular rhythm and normal heart sounds.           Pulmonary/Chest: Breath sounds normal. No respiratory distress. She has no wheezes.     Neurological: She is alert and oriented to person, place, and time.   Skin: Skin is warm and dry.       ED Course   Procedures  Labs Reviewed - No data to display       Imaging Results    None          Medications   ondansetron  disintegrating tablet 4 mg (4 mg Oral Given 23)   acetaminophen tablet 1,000 mg (1,000 mg Oral Given 23)   LORazepam tablet 0.5 mg (0.5 mg Oral Given 235)                Attending Attestation:             Attending ED Notes:   Emergent evaluation a 72-year-old female who presents the ED with right-sided nasal epistaxis.  Patient is afebrile, nontoxic-appearing stable vital signs.  Patient requesting to see ENT.  Patient advised we do not have ENT in the hospital.  On physical evaluation patient appears to have a anterior nasal bleed from Kiesselbach's plexus.  Nasal bleeding is a slow drip.  I did  patient on placing a nasal balloon catheter into the nares to stop the bleeding.  Patient adamantly declines.  Patient's caretaker states that they will go to [facility] to see ENT.  I will consult with transfer center and advised him of patient's intention.  Patient will allow me to use Surgicel to aid in her cessation of nasal bleeding.  Surgicel was effective and stopping patient's epistaxis.  The patient is extensively counseled on their diagnosis and treatment including all  physical exam findings.  The patient is discharged good condition and directed follow-up with their otorhinolaryngology in the next 24-48 hours.                 Clinical Impression:   Final diagnoses:  [R04.0] Right-sided epistaxis (Primary)        ED Disposition Condition    Discharge Good          ED Prescriptions       Medication Sig Dispense Start Date End Date Auth. Provider    ondansetron (ZOFRAN ODT) 4 MG TbDL () Take 1 tablet (4 mg total) by mouth every 8 (eight) hours as needed (Nausea). 12 tablet 2023 3/2/2023 Rich Jesus MD          Follow-up Information       Follow up With Specialties Details Why Contact Info Additional Information    [facility] Otolaryngology On 2023  [address]  [facility] [id number] [phone] Ear, Nose  & Throat Services - Main Building, 4th Floor Please park in [facility] and use Clinic elevator    Effie Olmedo MD Family Medicine In 1 week  [address] [address]  [phone]       [facility] - Emergency Dept Emergency Medicine  If symptoms worsen [address] [facility] [id number] [phone]              Rich Jesus MD  03/12/23 5089

## 2023-02-19 NOTE — TELEPHONE ENCOUNTER
Patient has nose bleed on day 3. Patient states she is trying all the techniques they were taught on the last 2 ED visits. Her  states blood is gushing out.    Reason for Disposition   [1] Bleeding now AND [2] second call after being instructed in correct technique of direct pressure    Additional Information   Negative: Fainted or too weak to stand following large blood loss   Negative: Sounds like a life-threatening emergency to the triager   Negative: Nosebleed followed a nose injury   Negative: [1] Bleeding present > 30 minutes AND [2] using correct method of direct pressure    Protocols used: Nosebleed-A-

## 2023-02-19 NOTE — DISCHARGE INSTRUCTIONS
A referral has been placed to ENT.  They will contact you to schedule an appointment for follow-up.  Also included their information should you need to call them.    An antibiotic was sent to your pharmacy.  Take it as directed.    Return to the emergency department for chest pain, fainting, recurrent uncontrollable bleeding concerns.

## 2023-02-19 NOTE — ED TRIAGE NOTES
Patient identifiers verified verbally with patient and correct for Jessica Floyd.    Jessica Floyd, a 72 y.o. female presents to the ED via personal transportation with CC epistaxis x3 days on and off, has been seen in ED for this but bleeding returned this AM. Pt also c/o lightheadedness over last 2-3 days. Arrived with surgicel in place to right nare with dressing over nare, moderate amount of dried blood noted on dressing.

## 2023-02-19 NOTE — ED TRIAGE NOTES
Patient to ED with complaints of intermittent epistaxis to R nostril x 3 days. Patient appears anxious, otherwise no other medical complaints. Aaox4. Respirations even and unlabored. Skin warm and dry. Plan of care discussed with patient - verbalized understanding. Side rails up x2. Pending ED workup at this time.

## 2023-02-19 NOTE — ED TRIAGE NOTES
Patient to ED with c/o intermittent epistaxis to Right nostril x 2 days . Patient also report nausea and headache at this time. NAD noted ,VSS . Bleeding controlled at this time.

## 2023-02-20 ENCOUNTER — TELEPHONE (OUTPATIENT)
Dept: OTOLARYNGOLOGY | Facility: CLINIC | Age: 73
End: 2023-02-20
Payer: MEDICARE

## 2023-02-20 NOTE — TELEPHONE ENCOUNTER
Spoke with spouse of pt who said that he was trying to get pt in today and if any cancellations but will take the appt for 2/22/23 @ 2:30 pm @ [facility] site.      ----- Message from Maeve Darden sent at 2/20/2023  6:45 AM CST -----  Contact: Sandie Floyd () [phone]  Type:  Same Day Appointment Request    Caller is requesting a same day appointment.  Caller declined first available appointment listed below.     Name of Caller: Sandie Floyd ()   When is the first available appointment? 2/27/23  Reason for Visit: Epistaxis [R04.0]  Best Call Back Number: [phone]  Additional Information: New patient, has ED referral. Patient has been to the ED 4 times within the last few days.

## 2023-02-20 NOTE — TELEPHONE ENCOUNTER
Pt  stated that the pt has a rhino rocket and was polarized. He stated that she has an appt on Wednesday  with the ent.

## 2023-02-22 ENCOUNTER — NURSE TRIAGE (OUTPATIENT)
Dept: ADMINISTRATIVE | Facility: CLINIC | Age: 73
End: 2023-02-22
Payer: MEDICARE

## 2023-02-22 ENCOUNTER — OFFICE VISIT (OUTPATIENT)
Dept: OTOLARYNGOLOGY | Facility: CLINIC | Age: 73
End: 2023-02-22
Payer: MEDICARE

## 2023-02-22 VITALS — BODY MASS INDEX: 23.63 KG/M2 | WEIGHT: 133.38 LBS

## 2023-02-22 DIAGNOSIS — R04.0 EPISTAXIS: Primary | ICD-10-CM

## 2023-02-22 PROCEDURE — 1159F PR MEDICATION LIST DOCUMENTED IN MEDICAL RECORD: ICD-10-PCS | Mod: CPTII,S$GLB,, | Performed by: OTOLARYNGOLOGY

## 2023-02-22 PROCEDURE — 1101F PT FALLS ASSESS-DOCD LE1/YR: CPT | Mod: CPTII,S$GLB,, | Performed by: OTOLARYNGOLOGY

## 2023-02-22 PROCEDURE — 1101F PR PT FALLS ASSESS DOC 0-1 FALLS W/OUT INJ PAST YR: ICD-10-PCS | Mod: CPTII,S$GLB,, | Performed by: OTOLARYNGOLOGY

## 2023-02-22 PROCEDURE — 99999 PR PBB SHADOW E&M-EST. PATIENT-LVL II: CPT | Mod: PBBFAC,,, | Performed by: OTOLARYNGOLOGY

## 2023-02-22 PROCEDURE — 99999 PR PBB SHADOW E&M-EST. PATIENT-LVL II: ICD-10-PCS | Mod: PBBFAC,,, | Performed by: OTOLARYNGOLOGY

## 2023-02-22 PROCEDURE — 3288F FALL RISK ASSESSMENT DOCD: CPT | Mod: CPTII,S$GLB,, | Performed by: OTOLARYNGOLOGY

## 2023-02-22 PROCEDURE — 1160F RVW MEDS BY RX/DR IN RCRD: CPT | Mod: CPTII,S$GLB,, | Performed by: OTOLARYNGOLOGY

## 2023-02-22 PROCEDURE — 99204 PR OFFICE/OUTPT VISIT, NEW, LEVL IV, 45-59 MIN: ICD-10-PCS | Mod: S$GLB,,, | Performed by: OTOLARYNGOLOGY

## 2023-02-22 PROCEDURE — 3008F BODY MASS INDEX DOCD: CPT | Mod: CPTII,S$GLB,, | Performed by: OTOLARYNGOLOGY

## 2023-02-22 PROCEDURE — 99204 OFFICE O/P NEW MOD 45 MIN: CPT | Mod: S$GLB,,, | Performed by: OTOLARYNGOLOGY

## 2023-02-22 PROCEDURE — 3008F PR BODY MASS INDEX (BMI) DOCUMENTED: ICD-10-PCS | Mod: CPTII,S$GLB,, | Performed by: OTOLARYNGOLOGY

## 2023-02-22 PROCEDURE — 1159F MED LIST DOCD IN RCRD: CPT | Mod: CPTII,S$GLB,, | Performed by: OTOLARYNGOLOGY

## 2023-02-22 PROCEDURE — 1160F PR REVIEW ALL MEDS BY PRESCRIBER/CLIN PHARMACIST DOCUMENTED: ICD-10-PCS | Mod: CPTII,S$GLB,, | Performed by: OTOLARYNGOLOGY

## 2023-02-22 PROCEDURE — 3288F PR FALLS RISK ASSESSMENT DOCUMENTED: ICD-10-PCS | Mod: CPTII,S$GLB,, | Performed by: OTOLARYNGOLOGY

## 2023-02-22 NOTE — TELEPHONE ENCOUNTER
Mrs. Floyd is calling today after having been seen multiple times in the ED for epistaxis, she had a new pt appt with Dr. Eason, ENT for today, but accidentally cancelled it and is unable to reschedule it online.  I advised I could transfer her over to scheduling, who could assist; she verbalized understanding, warm transferred the call to [facility] for assistance.      Reason for Disposition   [1] Follow-up call to recent contact AND [2] information only call, no triage required    Additional Information   Negative: RN needs further essential information from caller in order to complete triage   Negative: [1] Caller is not with the adult (patient) AND [2] reporting urgent symptoms   Negative: Lab result questions   Negative: Medication questions   Negative: Caller can't be reached by phone   Negative: Caller has already spoken to PCP or another triager   Negative: Requesting regular office appointment   Negative: [1] Caller requesting NON-URGENT health information AND [2] PCP's office is the best resource   Negative: [1] Caller is not with the adult (patient) AND [2] probable NON-URGENT symptoms   Negative: Health Information question, no triage required and triager able to answer question   Negative: General information question, no triage required and triager able to answer question   Negative: Question about upcoming scheduled test, no triage required and triager able to answer question    Protocols used: Information Only Call - [facility]

## 2023-02-22 NOTE — PATIENT INSTRUCTIONS
Start nasal saline spray 4-5 times a day. Ayr or equivalent nasal saline spray  I recommend humidifier for home  Start placing vaseline on a Q tip to both nostrils at night   If you have a nose bleed spray Afrin (over the counter decongestant Oxymetazoline or neosynephrine) and then apply pressure to bottom of nose as show in clinic.

## 2023-02-22 NOTE — PROGRESS NOTES
History of Present Illness:   Jessica Floyd is a 72 y.o. year old female evaluated on 2023, in the Otolaryngology-Head and Neck Surgery Clinic at [facility]. The patient was referred in ER follow-up for evaluation of epistaxis.  Patient had 3 ER visits at [facility] [place] on  and 2023 with return on the same day on the 2nd visit.  She had nosebleed that started at initial visit with no prior history of nosebleeds.  She was on prophylactic daily aspirin that she stop as soon as the 1st bleed.  She was putting tissues in her nose on the 1st bleed.  She was given Afrin soaked gauze on the 1st ER visit and then told to put lidocaine with epinephrine on gauze on the 2nd ER visit with return and then she presented to [facility] ER desiring to see ENT on 2023 where she had a rhino rocket placed.  Patient had since had follow-up with urgent care ENT and shell met where rhino rocket was removed at 48 hours and septum was cauterized on the right.  She returns today for evaluation.  She has not had no nosebleeds since the cauterization.  She denies any rhinorrhea or thick mucus or fevers.         Past Medical/Surgical History  Past Medical History:   Diagnosis Date    Allergic rhinitis     Amblyopia     Carotid stenosis     Coronary atherosclerosis     Outside [facility] 2014: 20% mLAD, 50% mCx, 20%, mRCA    Dyslipidemia     ESBL (extended spectrum beta-lactamase) producing bacteria infection     UTI    Hypertension     Nausea and vomiting 2017    Subarachnoid hemorrhage due to ruptured aneurysm      Her  has a past surgical history that includes Anterior cruciate ligament repair (); Dental surgery; Cerebral aneurysm repair (); Tonsillectomy; Adenoidectomy; Appendectomy;  section; Cataract extraction w/  intraocular lens implant (Right, 2022); Cataract extraction w/  intraocular lens implant (Left, 2022); and Eye surgery (Bilateral).     Past Family/Social  History  Her family history includes Alcohol abuse in her father; Aneurysm in her mother; Gout in her brother; Heart disease in her brother; Hypertension in her father and mother; Kidney disease in her brother; No Known Problems in her daughter, daughter, and daughter.  She  reports that she quit smoking about 24 years ago. Her smoking use included cigarettes. She has a 10.00 pack-year smoking history. She has never used smokeless tobacco. She reports current alcohol use of about 7.0 standard drinks per week. She reports that she does not use drugs.     Medications/Allergies/Immunizations  Her current medication(s) include:   Current Outpatient Medications   Medication Sig Dispense Refill    allopurinoL (ZYLOPRIM) 100 MG tablet Take 0.5 tablets (50 mg total) by mouth once daily. 30 tablet 11    amLODIPine (NORVASC) 10 MG tablet TAKE 1 TABLET BY MOUTH EVERY DAY 90 tablet 3    amoxicillin (AMOXIL) 500 MG capsule TAKE 2 CAPSULES BY MOUTH NOW, THEN ONE CAPSULE THREE TIMES A DAY UNTIL COMPLETELY FINISHED      aspirin (ECOTRIN) 81 MG EC tablet Take 1 tablet (81 mg total) by mouth once daily.  0    atorvastatin (LIPITOR) 80 MG tablet TAKE 1 TABLET BY MOUTH EVERY DAY 90 tablet 3    carvediloL (COREG) 12.5 MG tablet TAKE 1 TABLET BY MOUTH TWICE A DAY WITH MEALS 180 tablet 3    cephALEXin (KEFLEX) 500 MG capsule Take 1 capsule (500 mg total) by mouth every 8 (eight) hours. for 5 days 20 capsule 0    colchicine (MITIGARE) 0.6 mg Cap Take 1 capsule (0.6 mg total) by mouth once daily at 6am. 30 capsule 11    fluticasone propionate (FLONASE) 50 mcg/actuation nasal spray SPRAY ONCE INTO EACH NOSTRIL ONCE DAILY 16 mL 3    hydroCHLOROthiazide (HYDRODIURIL) 25 MG tablet TAKE 1 TABLET BY MOUTH EVERY DAY 90 tablet 3    HYDROcodone-acetaminophen (NORCO) 5-325 mg per tablet Take 1 tablet by mouth every 6 (six) hours as needed for Pain. 12 tablet 0    hydrocortisone 2.5 % ointment as needed.      ibuprofen (ADVIL,MOTRIN) 600 MG tablet  Take 1 tablet (600 mg total) by mouth every 6 (six) hours as needed for Pain. 20 tablet 0    ketoconazole (NIZORAL) 2 % cream APPLY BETWEEN TOES TWICE DAILY X 2 WKS      LIDOcaine (LIDODERM) 5 % Place 1 patch onto the skin once daily. Remove & Discard patch within 12 hours or as directed by MD 15 patch 0    methocarbamoL (ROBAXIN) 750 MG Tab Take 1 tablet (750 mg total) by mouth 3 (three) times daily. 10 tablet 0    ondansetron (ZOFRAN ODT) 4 MG TbDL Take 1 tablet (4 mg total) by mouth every 8 (eight) hours as needed (Nausea). 12 tablet 0    oxyCODONE-acetaminophen (PERCOCET) 5-325 mg per tablet Take 1 tablet by mouth every 6 (six) hours as needed for Pain. 12 tablet 0    pantoprazole (PROTONIX) 40 MG tablet TAKE 1 TABLET BY MOUTH EVERY DAY 90 tablet 0    potassium chloride (KLOR-CON) 10 MEQ TbSR Take 1 tablet (10 mEq total) by mouth once daily. for 5 days 5 tablet 0    promethazine (PHENERGAN) 25 MG tablet Take 1 tablet (25 mg total) by mouth every 6 (six) hours as needed for Nausea. 12 tablet 0     No current facility-administered medications for this visit.     Facility-Administered Medications Ordered in Other Visits   Medication Dose Route Frequency Provider Last Rate Last Admin    sodium hyaluronate (EUFLEXXA) 10 mg/mL(mw 2.4 -3.6 million) injection 20 mg  20 mg Intra-articular  Armani Cai PA-C   20 mg at 11/14/22 0830        Allergies: Patient has no known allergies.     Immunizations:   Immunization History   Administered Date(s) Administered    COVID-19, MRNA, LN-S, PF (Pfizer) (Gray Cap) 04/20/2022    COVID-19, MRNA, LN-S, PF (Pfizer) (Purple Cap) 01/09/2021, 01/30/2021, 08/23/2021    Influenza - High Dose - PF (65 years and older) 10/03/2016, 11/21/2017, 10/16/2018, 11/12/2019    Influenza - Quadrivalent - High Dose - PF (65 years and older) 10/02/2020, 10/08/2021    Pneumococcal Conjugate - 13 Valent 10/03/2016    Pneumococcal Polysaccharide - 23 Valent 11/21/2017    Tdap 08/04/2017    Zoster  Recombinant 10/08/2021, 05/23/2022         Review of Systems   Constitutional: Negative for fever, weight loss and weight gain.  Skin: Negative for rash, itchiness, dryness  HENT:  As per HPI  Cardiovascular: Negative for chest pain and dyspnea on exertion .   Respiratory: Is not experiencing shortness of breath.   Gastrointestinal: Negative for nausea and vomiting.   Neurological: Negative for headaches.   Lymph/Heme: Negative for lymphadenopathy or easy bruising  Musculoskeletal: Negative for joint or muscle pain  Psychiatric: The patient is not nervous/anxious.        All other systems are negative except for that listed in the HPI.      PHYSICAL EXAM:   Vital Signs:  Wt 60.5 kg (133 lb 6.1 oz)   BMI 23.63 kg/m²      General:  Well-developed, well-nourished  Communication and Voice:  Clear pitch and clarity  Hearing: Hearing adequate for verbal communication bilaterally   Inspection:  Normocephalic and atraumatic without mass or lesion  Palpation:  Facial skeleton intact without bony stepoffs  Parotid Glands:  No mass or tenderness  Facial Strength:  Facial motility symmetric and full bilaterally  Pinna:  External ear intact and fully developed  External canal:  Canal is patent with intact skin  Tympanic Membrane:  Clear and mobile  External nose:  No scar or anatomic deformity  Internal Nose:  Septum intact with slight right-sided deviation anteriorly on the right and Little's area is a large 1.5 x 1 cm area of previous cauterization with no bleeding on suctioning mucus overlying..    TMJ:  No pain to palpation with full mobility  Oral cavity, Lips, Teeth, and Gums:  Mucosa and teeth intact and viable, No lesions, masses or ulcers  Oropharynx: No erythema or exudate, no masses or ulcerations, non-obstructive tonsils no dripping noted or blood in the oropharynx  Nasopharynx:  No mass or lesion with intact mucosa  Hypopharynx:  Not well visualized secondary to gagging  Larynx:  Not well visualized secondary to  gagging  Neck, Trachea, Lymphatics:  Midline trachea without mass or lesion, no lymphadenopathy  Thyroid:  No mass or nodularity  Eyes: No nystagmus with equal extraocular motion bilaterally  Neuro/Psych/Balance: Patient oriented and appropriate in interaction;  Appropriate mood and affect;  Gait is intact with no imbalance; Cranial nerves I-XII are intact  Respiratory effort:  Equal inspiration and expiration without stridor  Peripheral Vascular:  Warm extremities with equal pulses     ASSESSMENT:   1. Epistaxis            PLAN:   Recurrent epistaxis controlled after rhino rocket and cauterization. The most common cause of nasal bleeds being dry nasal mucosa was discussed with the patient.  I recommended frequent nasal saline in addition to Vaseline to the nose at night and humidifier at home.  Instructions for control of the nosebleed with Afrin and pressure was discussed with the patient.  I discouraged her from placing any tissue or any gauze in the nose.  I will see her back in about 2 months for follow-up rigid nasal endoscopy.  All instructions were sent with the after visit summary.           I believe that Ms. Floyd has a good understanding of the issues involved and I answered all of her questions.     DISCLAIMER: This note was prepared with mi[employer] voice recognition transcription software. Garbled syntax, mangled pronouns, and other bizarre constructions may be attributed to that software system. While efforts were made to correct any mistakes made by this voice recognition program, some errors and/or omissions may remain in the note that were missed when the note was originally created.

## 2023-02-24 ENCOUNTER — PATIENT MESSAGE (OUTPATIENT)
Dept: CARDIOLOGY | Facility: CLINIC | Age: 73
End: 2023-02-24
Payer: MEDICARE

## 2023-02-24 ENCOUNTER — PATIENT MESSAGE (OUTPATIENT)
Dept: PRIMARY CARE CLINIC | Facility: CLINIC | Age: 73
End: 2023-02-24
Payer: MEDICARE

## 2023-02-24 DIAGNOSIS — R04.0 EPISTAXIS: ICD-10-CM

## 2023-02-24 DIAGNOSIS — D64.9 ANEMIA, UNSPECIFIED TYPE: ICD-10-CM

## 2023-02-24 DIAGNOSIS — I10 PRIMARY HYPERTENSION: Primary | ICD-10-CM

## 2023-02-24 DIAGNOSIS — Z00.00 ANNUAL PHYSICAL EXAM: ICD-10-CM

## 2023-02-28 ENCOUNTER — PATIENT MESSAGE (OUTPATIENT)
Dept: SPORTS MEDICINE | Facility: CLINIC | Age: 73
End: 2023-02-28

## 2023-02-28 ENCOUNTER — PATIENT OUTREACH (OUTPATIENT)
Dept: ADMINISTRATIVE | Facility: OTHER | Age: 73
End: 2023-02-28

## 2023-02-28 ENCOUNTER — OFFICE VISIT (OUTPATIENT)
Dept: RHEUMATOLOGY | Facility: CLINIC | Age: 73
End: 2023-02-28
Payer: MEDICARE

## 2023-02-28 ENCOUNTER — OFFICE VISIT (OUTPATIENT)
Dept: SPORTS MEDICINE | Facility: CLINIC | Age: 73
End: 2023-02-28
Payer: MEDICARE

## 2023-02-28 ENCOUNTER — LAB VISIT (OUTPATIENT)
Dept: LAB | Facility: HOSPITAL | Age: 73
End: 2023-02-28
Attending: INTERNAL MEDICINE
Payer: MEDICARE

## 2023-02-28 VITALS
BODY MASS INDEX: 23.1 KG/M2 | HEART RATE: 91 BPM | WEIGHT: 130.38 LBS | HEIGHT: 63 IN | OXYGEN SATURATION: 97 % | DIASTOLIC BLOOD PRESSURE: 73 MMHG | SYSTOLIC BLOOD PRESSURE: 135 MMHG

## 2023-02-28 VITALS
SYSTOLIC BLOOD PRESSURE: 130 MMHG | BODY MASS INDEX: 22.44 KG/M2 | WEIGHT: 126.63 LBS | HEIGHT: 63 IN | DIASTOLIC BLOOD PRESSURE: 64 MMHG

## 2023-02-28 DIAGNOSIS — M1A.9XX0 CHRONIC GOUT WITHOUT TOPHUS, UNSPECIFIED CAUSE, UNSPECIFIED SITE: Primary | ICD-10-CM

## 2023-02-28 DIAGNOSIS — S46.011A TRAUMATIC COMPLETE TEAR OF RIGHT ROTATOR CUFF, INITIAL ENCOUNTER: Primary | ICD-10-CM

## 2023-02-28 DIAGNOSIS — M21.619 BUNION: ICD-10-CM

## 2023-02-28 DIAGNOSIS — Z71.89 COUNSELING AND COORDINATION OF CARE: ICD-10-CM

## 2023-02-28 DIAGNOSIS — R04.0 EPISTAXIS: ICD-10-CM

## 2023-02-28 DIAGNOSIS — M79.671 FOOT PAIN, RIGHT: ICD-10-CM

## 2023-02-28 DIAGNOSIS — D64.9 ANEMIA, UNSPECIFIED TYPE: ICD-10-CM

## 2023-02-28 DIAGNOSIS — M15.9 PRIMARY OSTEOARTHRITIS INVOLVING MULTIPLE JOINTS: ICD-10-CM

## 2023-02-28 DIAGNOSIS — M20.41 HAMMER TOE OF RIGHT FOOT: ICD-10-CM

## 2023-02-28 DIAGNOSIS — M19.011 LOCALIZED PRIMARY OSTEOARTHRITIS OF RIGHT SHOULDER REGION: ICD-10-CM

## 2023-02-28 DIAGNOSIS — Z00.00 ANNUAL PHYSICAL EXAM: ICD-10-CM

## 2023-02-28 DIAGNOSIS — Z79.899 ENCOUNTER FOR LONG-TERM (CURRENT) USE OF OTHER MEDICATIONS: ICD-10-CM

## 2023-02-28 DIAGNOSIS — N18.31 STAGE 3A CHRONIC KIDNEY DISEASE: ICD-10-CM

## 2023-02-28 LAB
ALBUMIN SERPL BCP-MCNC: 3.6 G/DL (ref 3.5–5.2)
ALP SERPL-CCNC: 69 U/L (ref 55–135)
ALT SERPL W/O P-5'-P-CCNC: 25 U/L (ref 10–44)
ANION GAP SERPL CALC-SCNC: 11 MMOL/L (ref 8–16)
AST SERPL-CCNC: 45 U/L (ref 10–40)
BASOPHILS # BLD AUTO: 0.06 K/UL (ref 0–0.2)
BASOPHILS NFR BLD: 0.7 % (ref 0–1.9)
BILIRUB SERPL-MCNC: 0.5 MG/DL (ref 0.1–1)
BUN SERPL-MCNC: 10 MG/DL (ref 8–23)
CALCIUM SERPL-MCNC: 9.1 MG/DL (ref 8.7–10.5)
CHLORIDE SERPL-SCNC: 98 MMOL/L (ref 95–110)
CO2 SERPL-SCNC: 30 MMOL/L (ref 23–29)
CREAT SERPL-MCNC: 1 MG/DL (ref 0.5–1.4)
DIFFERENTIAL METHOD: ABNORMAL
EOSINOPHIL # BLD AUTO: 0.2 K/UL (ref 0–0.5)
EOSINOPHIL NFR BLD: 1.8 % (ref 0–8)
ERYTHROCYTE [DISTWIDTH] IN BLOOD BY AUTOMATED COUNT: 16.1 % (ref 11.5–14.5)
EST. GFR  (NO RACE VARIABLE): 59.5 ML/MIN/1.73 M^2
GLUCOSE SERPL-MCNC: 93 MG/DL (ref 70–110)
HCT VFR BLD AUTO: 28.5 % (ref 37–48.5)
HGB BLD-MCNC: 8.5 G/DL (ref 12–16)
IMM GRANULOCYTES # BLD AUTO: 0.04 K/UL (ref 0–0.04)
IMM GRANULOCYTES NFR BLD AUTO: 0.5 % (ref 0–0.5)
LYMPHOCYTES # BLD AUTO: 1.2 K/UL (ref 1–4.8)
LYMPHOCYTES NFR BLD: 14.8 % (ref 18–48)
MCH RBC QN AUTO: 29.8 PG (ref 27–31)
MCHC RBC AUTO-ENTMCNC: 29.8 G/DL (ref 32–36)
MCV RBC AUTO: 100 FL (ref 82–98)
MONOCYTES # BLD AUTO: 1.3 K/UL (ref 0.3–1)
MONOCYTES NFR BLD: 16.3 % (ref 4–15)
NEUTROPHILS # BLD AUTO: 5.4 K/UL (ref 1.8–7.7)
NEUTROPHILS NFR BLD: 65.9 % (ref 38–73)
NRBC BLD-RTO: 0 /100 WBC
PLATELET # BLD AUTO: 352 K/UL (ref 150–450)
PMV BLD AUTO: 11.5 FL (ref 9.2–12.9)
POTASSIUM SERPL-SCNC: 3.6 MMOL/L (ref 3.5–5.1)
PROT SERPL-MCNC: 6.7 G/DL (ref 6–8.4)
RBC # BLD AUTO: 2.85 M/UL (ref 4–5.4)
SODIUM SERPL-SCNC: 139 MMOL/L (ref 136–145)
WBC # BLD AUTO: 8.12 K/UL (ref 3.9–12.7)

## 2023-02-28 PROCEDURE — 80053 COMPREHEN METABOLIC PANEL: CPT | Mod: HCNC | Performed by: INTERNAL MEDICINE

## 2023-02-28 PROCEDURE — 3008F BODY MASS INDEX DOCD: CPT | Mod: HCNC,CPTII,S$GLB, | Performed by: PHYSICIAN ASSISTANT

## 2023-02-28 PROCEDURE — 3075F SYST BP GE 130 - 139MM HG: CPT | Mod: HCNC,CPTII,S$GLB, | Performed by: INTERNAL MEDICINE

## 2023-02-28 PROCEDURE — 99214 OFFICE O/P EST MOD 30 MIN: CPT | Mod: HCNC,S$GLB,, | Performed by: INTERNAL MEDICINE

## 2023-02-28 PROCEDURE — 1125F PR PAIN SEVERITY QUANTIFIED, PAIN PRESENT: ICD-10-PCS | Mod: HCNC,CPTII,S$GLB, | Performed by: INTERNAL MEDICINE

## 2023-02-28 PROCEDURE — 1159F PR MEDICATION LIST DOCUMENTED IN MEDICAL RECORD: ICD-10-PCS | Mod: HCNC,CPTII,S$GLB, | Performed by: INTERNAL MEDICINE

## 2023-02-28 PROCEDURE — 1159F MED LIST DOCD IN RCRD: CPT | Mod: HCNC,CPTII,S$GLB, | Performed by: PHYSICIAN ASSISTANT

## 2023-02-28 PROCEDURE — 99999 PR PBB SHADOW E&M-EST. PATIENT-LVL IV: CPT | Mod: PBBFAC,HCNC,, | Performed by: INTERNAL MEDICINE

## 2023-02-28 PROCEDURE — 1101F PT FALLS ASSESS-DOCD LE1/YR: CPT | Mod: HCNC,CPTII,S$GLB, | Performed by: INTERNAL MEDICINE

## 2023-02-28 PROCEDURE — 1125F AMNT PAIN NOTED PAIN PRSNT: CPT | Mod: HCNC,CPTII,S$GLB, | Performed by: INTERNAL MEDICINE

## 2023-02-28 PROCEDURE — 1101F PR PT FALLS ASSESS DOC 0-1 FALLS W/OUT INJ PAST YR: ICD-10-PCS | Mod: HCNC,CPTII,S$GLB, | Performed by: INTERNAL MEDICINE

## 2023-02-28 PROCEDURE — 99214 PR OFFICE/OUTPT VISIT, EST, LEVL IV, 30-39 MIN: ICD-10-PCS | Mod: HCNC,S$GLB,, | Performed by: INTERNAL MEDICINE

## 2023-02-28 PROCEDURE — 1160F RVW MEDS BY RX/DR IN RCRD: CPT | Mod: HCNC,CPTII,S$GLB, | Performed by: PHYSICIAN ASSISTANT

## 2023-02-28 PROCEDURE — 99214 OFFICE O/P EST MOD 30 MIN: CPT | Mod: HCNC,S$GLB,, | Performed by: PHYSICIAN ASSISTANT

## 2023-02-28 PROCEDURE — 3288F PR FALLS RISK ASSESSMENT DOCUMENTED: ICD-10-PCS | Mod: HCNC,CPTII,S$GLB, | Performed by: PHYSICIAN ASSISTANT

## 2023-02-28 PROCEDURE — 3078F PR MOST RECENT DIASTOLIC BLOOD PRESSURE < 80 MM HG: ICD-10-PCS | Mod: HCNC,CPTII,S$GLB, | Performed by: INTERNAL MEDICINE

## 2023-02-28 PROCEDURE — 3008F BODY MASS INDEX DOCD: CPT | Mod: HCNC,CPTII,S$GLB, | Performed by: INTERNAL MEDICINE

## 2023-02-28 PROCEDURE — 1160F PR REVIEW ALL MEDS BY PRESCRIBER/CLIN PHARMACIST DOCUMENTED: ICD-10-PCS | Mod: HCNC,CPTII,S$GLB, | Performed by: PHYSICIAN ASSISTANT

## 2023-02-28 PROCEDURE — 36415 COLL VENOUS BLD VENIPUNCTURE: CPT | Mod: HCNC,PN | Performed by: INTERNAL MEDICINE

## 2023-02-28 PROCEDURE — 3075F SYST BP GE 130 - 139MM HG: CPT | Mod: HCNC,CPTII,S$GLB, | Performed by: PHYSICIAN ASSISTANT

## 2023-02-28 PROCEDURE — 99999 PR PBB SHADOW E&M-EST. PATIENT-LVL III: ICD-10-PCS | Mod: PBBFAC,HCNC,, | Performed by: PHYSICIAN ASSISTANT

## 2023-02-28 PROCEDURE — 99999 PR PBB SHADOW E&M-EST. PATIENT-LVL III: CPT | Mod: PBBFAC,HCNC,, | Performed by: PHYSICIAN ASSISTANT

## 2023-02-28 PROCEDURE — 1159F PR MEDICATION LIST DOCUMENTED IN MEDICAL RECORD: ICD-10-PCS | Mod: HCNC,CPTII,S$GLB, | Performed by: PHYSICIAN ASSISTANT

## 2023-02-28 PROCEDURE — 3008F PR BODY MASS INDEX (BMI) DOCUMENTED: ICD-10-PCS | Mod: HCNC,CPTII,S$GLB, | Performed by: INTERNAL MEDICINE

## 2023-02-28 PROCEDURE — 3008F PR BODY MASS INDEX (BMI) DOCUMENTED: ICD-10-PCS | Mod: HCNC,CPTII,S$GLB, | Performed by: PHYSICIAN ASSISTANT

## 2023-02-28 PROCEDURE — 3288F PR FALLS RISK ASSESSMENT DOCUMENTED: ICD-10-PCS | Mod: HCNC,CPTII,S$GLB, | Performed by: INTERNAL MEDICINE

## 2023-02-28 PROCEDURE — 1101F PR PT FALLS ASSESS DOC 0-1 FALLS W/OUT INJ PAST YR: ICD-10-PCS | Mod: HCNC,CPTII,S$GLB, | Performed by: PHYSICIAN ASSISTANT

## 2023-02-28 PROCEDURE — 1159F MED LIST DOCD IN RCRD: CPT | Mod: HCNC,CPTII,S$GLB, | Performed by: INTERNAL MEDICINE

## 2023-02-28 PROCEDURE — 1101F PT FALLS ASSESS-DOCD LE1/YR: CPT | Mod: HCNC,CPTII,S$GLB, | Performed by: PHYSICIAN ASSISTANT

## 2023-02-28 PROCEDURE — 3078F DIAST BP <80 MM HG: CPT | Mod: HCNC,CPTII,S$GLB, | Performed by: PHYSICIAN ASSISTANT

## 2023-02-28 PROCEDURE — 99999 PR PBB SHADOW E&M-EST. PATIENT-LVL IV: ICD-10-PCS | Mod: PBBFAC,HCNC,, | Performed by: INTERNAL MEDICINE

## 2023-02-28 PROCEDURE — 3075F PR MOST RECENT SYSTOLIC BLOOD PRESS GE 130-139MM HG: ICD-10-PCS | Mod: HCNC,CPTII,S$GLB, | Performed by: INTERNAL MEDICINE

## 2023-02-28 PROCEDURE — 3288F FALL RISK ASSESSMENT DOCD: CPT | Mod: HCNC,CPTII,S$GLB, | Performed by: INTERNAL MEDICINE

## 2023-02-28 PROCEDURE — 3078F PR MOST RECENT DIASTOLIC BLOOD PRESSURE < 80 MM HG: ICD-10-PCS | Mod: HCNC,CPTII,S$GLB, | Performed by: PHYSICIAN ASSISTANT

## 2023-02-28 PROCEDURE — 99214 PR OFFICE/OUTPT VISIT, EST, LEVL IV, 30-39 MIN: ICD-10-PCS | Mod: HCNC,S$GLB,, | Performed by: PHYSICIAN ASSISTANT

## 2023-02-28 PROCEDURE — 3288F FALL RISK ASSESSMENT DOCD: CPT | Mod: HCNC,CPTII,S$GLB, | Performed by: PHYSICIAN ASSISTANT

## 2023-02-28 PROCEDURE — 85025 COMPLETE CBC W/AUTO DIFF WBC: CPT | Mod: HCNC | Performed by: INTERNAL MEDICINE

## 2023-02-28 PROCEDURE — 3075F PR MOST RECENT SYSTOLIC BLOOD PRESS GE 130-139MM HG: ICD-10-PCS | Mod: HCNC,CPTII,S$GLB, | Performed by: PHYSICIAN ASSISTANT

## 2023-02-28 PROCEDURE — 3078F DIAST BP <80 MM HG: CPT | Mod: HCNC,CPTII,S$GLB, | Performed by: INTERNAL MEDICINE

## 2023-02-28 RX ORDER — COLCHICINE 0.6 MG/1
1 CAPSULE ORAL DAILY
Qty: 30 CAPSULE | Refills: 11 | Status: SHIPPED | OUTPATIENT
Start: 2023-02-28 | End: 2023-05-18 | Stop reason: SDUPTHER

## 2023-02-28 RX ORDER — ALLOPURINOL 100 MG/1
50 TABLET ORAL DAILY
Qty: 30 TABLET | Refills: 11 | Status: SHIPPED | OUTPATIENT
Start: 2023-02-28 | End: 2023-05-18 | Stop reason: SDUPTHER

## 2023-02-28 NOTE — PROGRESS NOTES
ESTABLISHED PATIENT    History 02/28/2023:  Jessica returns here today for follow-up of her right shoulder.  She has a large rotator cuff tear and underlying osteoarthritis.  She has previously failed a subacromial corticosteroid injection and we are attempting to manage her symptoms conservatively.  She was only able to attend 1 visit with physical therapy due to having uncontrollable epistaxis and having to go to the emergency room and ENT several times over the last 2 weeks.  She complains of having significant right shoulder pain and stiffness.    History 02/06/2023:  Jessica returns here today for follow-up evaluation of her right shoulder.  She was given a subacromial corticosteroid injection at her last visit.  While attempting to lift herself up from a restroom toilet, she felt a pop in her shoulder and had immediate discomfort.  She was seen in the emergency room for this.  A CT arthrogram was ordered for further evaluation of her rotator cuff.  She is here today to discuss the results.  She has been using a shoulder sling intermittently for pain relief.    History 01/23/2023:  Jessica returns here today for follow-up evaluation of her right shoulder.  She states that the Medrol Dosepak gave her very minimal relief.  She has had worsening pain and weakness since her last visit.  She is having a lot of difficulty getting dressed and sleeping at night.  She has been taking Tylenol p.m. at night which has helped her get some sleep.    Of note, she states that her knee pain has resolved following the Orthovisc series.    History 01/10/2023:  72 y.o. Female with a history of right shoulder pain who began having pain 1 week ago.  She denies having any precipitating injury or trauma but states that she was moving a lot of boxes which may have irritated her shoulder.  She is having a lot of discomfort with movement and at night while sleeping.  She has had difficulty getting dressed due to the pain and is frequently  dropping objects due to weakness.        + mechanical symptoms, - instability    Is affecting ADLs.  Pain is 10/10 at it's worst.        PAST MEDICAL HISTORY    Past Medical History:   Diagnosis Date    Allergic rhinitis     Amblyopia     Carotid stenosis     Coronary atherosclerosis     Outside [facility] 2014: 20% mLAD, 50% mCx, 20%, mRCA    Dyslipidemia     ESBL (extended spectrum beta-lactamase) producing bacteria infection     UTI    Hypertension     Nausea and vomiting 2017    Subarachnoid hemorrhage due to ruptured aneurysm        PAST SURGICAL HISTORY     Past Surgical History:   Procedure Laterality Date    ADENOIDECTOMY      ANTERIOR CRUCIATE LIGAMENT REPAIR      APPENDECTOMY      CATARACT EXTRACTION W/  INTRAOCULAR LENS IMPLANT Right 2022    Procedure: EXTRACTION, CATARACT, WITH IOL INSERTION;  Surgeon: Higinio Cristina MD;  Location: [facility] OR;  Service: Ophthalmology;  Laterality: Right;    CATARACT EXTRACTION W/  INTRAOCULAR LENS IMPLANT Left 2022    Procedure: EXTRACTION, CATARACT, WITH IOL INSERTION;  Surgeon: Higinio Cristina MD;  Location: [facility] OR;  Service: Ophthalmology;  Laterality: Left;    CEREBRAL ANEURYSM REPAIR      clip     SECTION      DENTAL SURGERY      EYE SURGERY Bilateral     cataract removal and lens implants    TONSILLECTOMY         FAMILY HISTORY    Family History   Problem Relation Age of Onset    Hypertension Mother     Aneurysm Mother     Hypertension Father     Alcohol abuse Father     Kidney disease Brother     Heart disease Brother     Gout Brother     No Known Problems Daughter     No Known Problems Daughter     No Known Problems Daughter        SOCIAL HISTORY    Social History     Socioeconomic History    Marital status:    Occupational History    Occupation: Retired   Tobacco Use    Smoking status: Former     Packs/day: 0.50     Years: 20.00     Pack years: 10.00     Types: Cigarettes     Quit date: 1999     Years since quittin.1     Smokeless tobacco: Never   Substance and Sexual Activity    Alcohol use: Yes     Alcohol/week: 7.0 standard drinks     Types: 7 Glasses of wine per week    Drug use: No    Sexual activity: Yes     Partners: Male   Social History Narrative    .  Has 3 grown daughters.   lives in [facility].       Social Determinants of Health     Financial Resource Strain: Low Risk     Difficulty of Paying Living Expenses: Not hard at all   Food Insecurity: No Food Insecurity    Worried About Running Out of Food in the Last Year: Never true    Ran Out of Food in the Last Year: Never true   Transportation Needs: Unmet Transportation Needs    Lack of Transportation (Medical): Yes    Lack of Transportation (Non-Medical): Yes   Physical Activity: Inactive    Days of Exercise per Week: 0 days    Minutes of Exercise per Session: 0 min   Stress: Stress Concern Present    Feeling of Stress : To some extent   Social Connections: Moderately Integrated    Frequency of Communication with Friends and Family: More than three times a week    Frequency of Social Gatherings with Friends and Family: More than three times a week    Attends [employer] Services: More than 4 times per year    Active Member of Clubs or Organizations: No    Attends Club or Organization Meetings: Never    Marital Status:    Housing Stability: Unknown    Unable to Pay for Housing in the Last Year: No    Unstable Housing in the Last Year: No       MEDICATIONS      Current Outpatient Medications:     allopurinoL (ZYLOPRIM) 100 MG tablet, Take 0.5 tablets (50 mg total) by mouth once daily., Disp: 30 tablet, Rfl: 11    amLODIPine (NORVASC) 10 MG tablet, TAKE 1 TABLET BY MOUTH EVERY DAY, Disp: 90 tablet, Rfl: 3    amoxicillin (AMOXIL) 500 MG capsule, TAKE 2 CAPSULES BY MOUTH NOW, THEN ONE CAPSULE THREE TIMES A DAY UNTIL COMPLETELY FINISHED, Disp: , Rfl:     aspirin (ECOTRIN) 81 MG EC tablet, Take 1 tablet (81 mg total) by mouth once daily., Disp: , Rfl: 0     atorvastatin (LIPITOR) 80 MG tablet, TAKE 1 TABLET BY MOUTH EVERY DAY, Disp: 90 tablet, Rfl: 3    carvediloL (COREG) 12.5 MG tablet, TAKE 1 TABLET BY MOUTH TWICE A DAY WITH MEALS, Disp: 180 tablet, Rfl: 3    colchicine (MITIGARE) 0.6 mg Cap, Take 1 capsule (0.6 mg total) by mouth once daily at 6am., Disp: 30 capsule, Rfl: 11    fluticasone propionate (FLONASE) 50 mcg/actuation nasal spray, SPRAY ONCE INTO EACH NOSTRIL ONCE DAILY, Disp: 16 mL, Rfl: 3    hydroCHLOROthiazide (HYDRODIURIL) 25 MG tablet, TAKE 1 TABLET BY MOUTH EVERY DAY, Disp: 90 tablet, Rfl: 3    HYDROcodone-acetaminophen (NORCO) 5-325 mg per tablet, Take 1 tablet by mouth every 6 (six) hours as needed for Pain., Disp: 12 tablet, Rfl: 0    hydrocortisone 2.5 % ointment, as needed., Disp: , Rfl:     ibuprofen (ADVIL,MOTRIN) 600 MG tablet, Take 1 tablet (600 mg total) by mouth every 6 (six) hours as needed for Pain., Disp: 20 tablet, Rfl: 0    ketoconazole (NIZORAL) 2 % cream, APPLY BETWEEN TOES TWICE DAILY X 2 WKS, Disp: , Rfl:     LIDOcaine (LIDODERM) 5 %, Place 1 patch onto the skin once daily. Remove & Discard patch within 12 hours or as directed by MD, Disp: 15 patch, Rfl: 0    methocarbamoL (ROBAXIN) 750 MG Tab, Take 1 tablet (750 mg total) by mouth 3 (three) times daily., Disp: 10 tablet, Rfl: 0    ondansetron (ZOFRAN ODT) 4 MG TbDL, Take 1 tablet (4 mg total) by mouth every 8 (eight) hours as needed (Nausea)., Disp: 12 tablet, Rfl: 0    oxyCODONE-acetaminophen (PERCOCET) 5-325 mg per tablet, Take 1 tablet by mouth every 6 (six) hours as needed for Pain., Disp: 12 tablet, Rfl: 0    pantoprazole (PROTONIX) 40 MG tablet, TAKE 1 TABLET BY MOUTH EVERY DAY, Disp: 90 tablet, Rfl: 0    promethazine (PHENERGAN) 25 MG tablet, Take 1 tablet (25 mg total) by mouth every 6 (six) hours as needed for Nausea., Disp: 12 tablet, Rfl: 0  No current facility-administered medications for this visit.    Facility-Administered Medications Ordered in Other Visits:      "sodium hyaluronate (EUFLEXXA) 10 mg/mL(mw 2.4 -3.6 million) injection 20 mg, 20 mg, Intra-articular, , Armani Cai PA-C, 20 mg at 11/14/22 0830    ALLERGIES     Review of patient's allergies indicates:  No Known Allergies      REVIEW OF SYSTEMS   Constitution: Negative. Negative for chills, fever and night sweats.   HENT: Negative for congestion and headaches.    Eyes: Negative for blurred vision, left vision loss and right vision loss.   Cardiovascular: Negative for chest pain and syncope.   Respiratory: Negative for cough and shortness of breath.    Endocrine: Negative for polydipsia, polyphagia and polyuria.   Hematologic/Lymphatic: Negative for bleeding problem. Does not bruise/bleed easily.   Skin: Negative for dry skin, itching and rash.   Musculoskeletal: Negative for falls. Positive for right shoulder pain and weakness.  Gastrointestinal: Negative for abdominal pain and bowel incontinence.   Genitourinary: Negative for bladder incontinence and nocturia.   Neurological: Negative for disturbances in coordination, loss of balance and seizures.   Psychiatric/Behavioral: Negative for depression. The patient does not have insomnia.    Allergic/Immunologic: Negative for hives and persistent infections.     PHYSICAL EXAMINATION    Vitals: /64 (BP Location: Left arm, Patient Position: Sitting, BP Method: Small (Manual))   Ht 5' 3" (1.6 m)   Wt 57.5 kg (126 lb 10.5 oz)   BMI 22.44 kg/m²     General: The patient appears active and healthy with no apparent physical problems.  No disturbance of mood or affect is demonstrated. Alert and Oriented.    HEENT: Eyes normal, pupils equally round, nose normal.    Resp: Equal and symmetrical chest rises. No wheezing    CV: Regular rate    Neck: Supple; nonpainful range of motion.    Extremities: no cyanosis, clubbing, edema, or diffuse swelling.  Palpable pulses, good capillary refill of the digits.  No coolness, discoloration, edema or obvious " varicosities.    Skin: no lesions noted.    Lymphatic: no detected adenopathy in the upper or lower extremities.    Neurologic: normal mental status, normal reflexes, normal gait and balance.  Patient is alert and oriented to person, place and time.  No flaccidity or spasticity is noted.  No motor or sensory deficits are noted.  Light touch is intact    Orthopaedic: Shoulder Musculoskeletal Exam    Inspection    Right      Right shoulder inspection is normal.      Ecchymosis: none      Symmetry: symmetric      Masses: none      Skin tenting: none      Prior incision: none    Inspection additional comments: + sling to the RUE    Palpation    Right      Crepitus: no crepitus      Increased warmth: none      Tenderness: present        Anterior shoulder: moderate        Posterior shoulder: moderate        Clavicle: none        AC joint: mild        Sternoclavicular joint: none        Rotator cuff: moderate        Greater tuberosity: moderate        Superior pole of scapula: none        Inferior pole of scapula: none        Bicipital groove: mild        Proximal biceps: mild    Range of Motion    Right      Active ROM: abnormal and pain.       Passive ROM: abnormal and pain.       Active forward elevation: 60.       Passive forward elevation: 120.       Shoulder active abduction: 30.       Passive abduction: 50.       Active external rotation at side: 0.       Passive external rotation at side: 20.       Active external rotation in abduction: 10.       Passive external rotation in abduction: 20.       Internal rotation: side.     Left      Internal rotation: T10.     Strength    Right      External rotation: 3/5. External rotation is affected by pain.       Internal rotation: 3/5. Internal rotation is affected by pain.       Abduction: 3/5. Abduction is affected by pain.       Biceps: 5/5. Biceps are not affected by pain.       Triceps: 5/5. Triceps are not affected by pain.     Neurovascular    Right      Right shoulder  nerve sensation is normal.    Scapula    Right      Right shoulder scapula is normal.    Special Tests    Right    Rotator Cuff Signs      Neer's test: positive       Bonilla test: positive       Supraspinatus: positive       Belly press test: positive       Painful arc test: positive       Drop arm test: positive     Biceps/trini Signs      Watonwan's test: negative       Clicking/popping: negative     AC Joint Signs      Active horizontal adduction pain: negative     Instability Signs      Joint laxity: negative       IMAGING    XR ARTHROGRAM SHOULDER RIGHT, INJECTION ONLY WITH CT TO FOLLOW (XPD)  Narrative: EXAMINATION:  XR ARTHROGRAM SHOULDER RIGHT, INJECTION ONLY WITH CT TO FOLLOW (XPD)    CLINICAL HISTORY:  Pain in right shoulder    TECHNIQUE:  Written informed consent was obtained and the patient was prepped and draped in a sterile fashion.  The right shoulder was localized under fluoroscopy and 1% lidocaine was used to achieve local anesthesia.  A 22-gauge spinal needle was inserted into the joint via an anterior approach and position was confirmed with low resistance injection.  Solution of 12 mL dilated Omnipaque 300 (50% contrast: 50% normal saline) was injected.  Needle was removed and adequate hemostasis was achieved.    Fluoroscopy time = 27 seconds    COMPARISON:  Shoulder radiograph 01/27/2020    FINDINGS:  Intra-articular contrast is noted.  Impression: Successful right shoulder arthrogram.  CT report to follow.    Electronically signed by resident: Misha Greenwood  Date:    02/03/2023  Time:    13:03    Electronically signed by: Musa Maloney MD  Date:    02/03/2023  Time:    15:02  CT Arthrogram Shoulder Right  Narrative: EXAMINATION:  CT ARTHROGRAM SHOULDER RIGHT    CLINICAL HISTORY:  Shoulder pain, rotator cuff disorder suspected, xray done;  Pain in right shoulder    TECHNIQUE:  Routine right multiplanar CT arthrogram shoulder performed after the intra-articular injection of  contrast.    COMPARISON:  None    FINDINGS:  Rotator cuff: There is a large full-thickness rotator cuff tear involving the entire supra and infraspinatus tendons.  There is tendon retraction to the glenoid rim.  The teres minor and subscapularis are intact.  There is mild volume loss of both supra and infraspinatus muscles.  High-riding humeral head noted abutting the acromion.    Mild AC joint arthropathy.    The biceps tendon not identified, probably torn.    Generalized cartilage thinning/joint space narrowing noted with mild osteophytosis along the inferior margin of the humeral head.  No fractures or marrow replacement process.  No evidence of osteomyelitis.  No axillary lymphadenopathy.  Emphysematous changes noted in the right lung apex.  Impression: Large full-thickness rotator cuff tear, involving the infraspinatus and supraspinatus tendons, as above.    This report was flagged in Epic as abnormal.    Electronically signed by: Musa Maloney MD  Date:    02/03/2023  Time:    13:22        IMPRESSION       ICD-10-CM ICD-9-CM   1. Traumatic complete tear of right rotator cuff, initial encounter  S46.011A 840.4   2. Localized primary osteoarthritis of right shoulder region  M19.011 715.11             MEDICATIONS PRESCRIBED      None    RECOMMENDATIONS     Surgical and nonsurgical treatment options for rotator cuff tear with underlying OA were discussed today; needs to wait at least 90 days from her last injection before considering surgical intervention  Physical therapy for right shoulder strengthening and range of motion; including modalities such as dry needling and manual manipulation  Tylenol 1000 milligrams by mouth 3 times daily as needed for pain  Return to clinic in 6 weeks for repeat evaluation      All questions were answered, pt will contact us for questions or concerns in the interim.

## 2023-03-02 ENCOUNTER — OFFICE VISIT (OUTPATIENT)
Dept: CARDIOLOGY | Facility: CLINIC | Age: 73
End: 2023-03-02
Payer: MEDICARE

## 2023-03-02 ENCOUNTER — TELEPHONE (OUTPATIENT)
Dept: PRIMARY CARE CLINIC | Facility: CLINIC | Age: 73
End: 2023-03-02
Payer: MEDICARE

## 2023-03-02 VITALS
SYSTOLIC BLOOD PRESSURE: 124 MMHG | BODY MASS INDEX: 22.8 KG/M2 | HEART RATE: 82 BPM | OXYGEN SATURATION: 98 % | DIASTOLIC BLOOD PRESSURE: 70 MMHG | WEIGHT: 128.69 LBS

## 2023-03-02 DIAGNOSIS — E78.00 HYPERCHOLESTEROLEMIA: ICD-10-CM

## 2023-03-02 DIAGNOSIS — I65.21 STENOSIS OF RIGHT CAROTID ARTERY: ICD-10-CM

## 2023-03-02 DIAGNOSIS — D62 ACUTE BLOOD LOSS ANEMIA: Primary | ICD-10-CM

## 2023-03-02 DIAGNOSIS — I25.10 ATHEROSCLEROSIS OF NATIVE CORONARY ARTERY OF NATIVE HEART WITHOUT ANGINA PECTORIS: ICD-10-CM

## 2023-03-02 DIAGNOSIS — I10 PRIMARY HYPERTENSION: ICD-10-CM

## 2023-03-02 DIAGNOSIS — N18.31 STAGE 3A CHRONIC KIDNEY DISEASE: ICD-10-CM

## 2023-03-02 PROCEDURE — 3078F PR MOST RECENT DIASTOLIC BLOOD PRESSURE < 80 MM HG: ICD-10-PCS | Mod: HCNC,CPTII,S$GLB, | Performed by: INTERNAL MEDICINE

## 2023-03-02 PROCEDURE — 1126F AMNT PAIN NOTED NONE PRSNT: CPT | Mod: HCNC,CPTII,S$GLB, | Performed by: INTERNAL MEDICINE

## 2023-03-02 PROCEDURE — 99999 PR PBB SHADOW E&M-EST. PATIENT-LVL IV: CPT | Mod: PBBFAC,HCNC,, | Performed by: INTERNAL MEDICINE

## 2023-03-02 PROCEDURE — 1160F RVW MEDS BY RX/DR IN RCRD: CPT | Mod: HCNC,CPTII,S$GLB, | Performed by: INTERNAL MEDICINE

## 2023-03-02 PROCEDURE — 93010 ELECTROCARDIOGRAM REPORT: CPT | Mod: HCNC,S$GLB,, | Performed by: INTERNAL MEDICINE

## 2023-03-02 PROCEDURE — 3074F PR MOST RECENT SYSTOLIC BLOOD PRESSURE < 130 MM HG: ICD-10-PCS | Mod: HCNC,CPTII,S$GLB, | Performed by: INTERNAL MEDICINE

## 2023-03-02 PROCEDURE — 1159F MED LIST DOCD IN RCRD: CPT | Mod: HCNC,CPTII,S$GLB, | Performed by: INTERNAL MEDICINE

## 2023-03-02 PROCEDURE — 99999 PR PBB SHADOW E&M-EST. PATIENT-LVL IV: ICD-10-PCS | Mod: PBBFAC,HCNC,, | Performed by: INTERNAL MEDICINE

## 2023-03-02 PROCEDURE — 3078F DIAST BP <80 MM HG: CPT | Mod: HCNC,CPTII,S$GLB, | Performed by: INTERNAL MEDICINE

## 2023-03-02 PROCEDURE — 1101F PR PT FALLS ASSESS DOC 0-1 FALLS W/OUT INJ PAST YR: ICD-10-PCS | Mod: HCNC,CPTII,S$GLB, | Performed by: INTERNAL MEDICINE

## 2023-03-02 PROCEDURE — 99214 PR OFFICE/OUTPT VISIT, EST, LEVL IV, 30-39 MIN: ICD-10-PCS | Mod: HCNC,S$GLB,, | Performed by: INTERNAL MEDICINE

## 2023-03-02 PROCEDURE — 1160F PR REVIEW ALL MEDS BY PRESCRIBER/CLIN PHARMACIST DOCUMENTED: ICD-10-PCS | Mod: HCNC,CPTII,S$GLB, | Performed by: INTERNAL MEDICINE

## 2023-03-02 PROCEDURE — 1159F PR MEDICATION LIST DOCUMENTED IN MEDICAL RECORD: ICD-10-PCS | Mod: HCNC,CPTII,S$GLB, | Performed by: INTERNAL MEDICINE

## 2023-03-02 PROCEDURE — 3288F FALL RISK ASSESSMENT DOCD: CPT | Mod: HCNC,CPTII,S$GLB, | Performed by: INTERNAL MEDICINE

## 2023-03-02 PROCEDURE — 93010 EKG 12-LEAD: ICD-10-PCS | Mod: HCNC,S$GLB,, | Performed by: INTERNAL MEDICINE

## 2023-03-02 PROCEDURE — 3008F BODY MASS INDEX DOCD: CPT | Mod: HCNC,CPTII,S$GLB, | Performed by: INTERNAL MEDICINE

## 2023-03-02 PROCEDURE — 1126F PR PAIN SEVERITY QUANTIFIED, NO PAIN PRESENT: ICD-10-PCS | Mod: HCNC,CPTII,S$GLB, | Performed by: INTERNAL MEDICINE

## 2023-03-02 PROCEDURE — 99214 OFFICE O/P EST MOD 30 MIN: CPT | Mod: HCNC,S$GLB,, | Performed by: INTERNAL MEDICINE

## 2023-03-02 PROCEDURE — 1101F PT FALLS ASSESS-DOCD LE1/YR: CPT | Mod: HCNC,CPTII,S$GLB, | Performed by: INTERNAL MEDICINE

## 2023-03-02 PROCEDURE — 93005 ELECTROCARDIOGRAM TRACING: CPT | Mod: HCNC

## 2023-03-02 PROCEDURE — 3008F PR BODY MASS INDEX (BMI) DOCUMENTED: ICD-10-PCS | Mod: HCNC,CPTII,S$GLB, | Performed by: INTERNAL MEDICINE

## 2023-03-02 PROCEDURE — 3288F PR FALLS RISK ASSESSMENT DOCUMENTED: ICD-10-PCS | Mod: HCNC,CPTII,S$GLB, | Performed by: INTERNAL MEDICINE

## 2023-03-02 PROCEDURE — 3074F SYST BP LT 130 MM HG: CPT | Mod: HCNC,CPTII,S$GLB, | Performed by: INTERNAL MEDICINE

## 2023-03-02 NOTE — TELEPHONE ENCOUNTER
----- Message from Effie Olmedo MD sent at 3/1/2023  2:07 PM CST -----  Please contact the patient to schedule appt in office with me or telemed due to recent ED visit and labs showing drop in hemoglobin. Thanks PV

## 2023-03-02 NOTE — PROGRESS NOTES
"[facility]   Physical Therapy Treatment Note     Name: Jessica Floyd  Clinic Number: [id number]    Therapy Diagnosis: No diagnosis found.  Physician: Armani Cai PA-C    Visit Date: 3/3/2023    Physician Orders: Physical Therapy Evaluate and Treat  Medical Diagnosis from Referral:   S46.011A (ICD-10-CM) - Traumatic complete tear of right rotator cuff, initial encounter   M19.011 (ICD-10-CM) - Localized primary osteoarthritis of right shoulder region      Evaluation Date: 2/15/2023  Authorization Period Expiration: 2/6/2024  Plan of Care Expiration: 2/15/2023 to 4/12/2023  Visit # / Visits authorized: 1/1 (pending additional authorization following initial evaluation)   FOTO: 1/3  on 2/15/2023  PTA Visit #: 1/ 5    Precautions: Standard    Time In: 9:18 am  Time Out: 10:00 am  Total Billable Time: 42 minutes    SUBJECTIVE   Pt reports: The exercises were too much and bothered the shoulder even more. Has not been able to get a good night sleep since and has been in and out of the hospital     She was not compliant with home exercise program.  Response to previous treatment: "I was in pain after"  Functional change: None today    Pain: 6/10 sitting still; 15/10 when moving arm  Location: R shoulder     OBJECTIVE     2/15/2023:  Observation: ambulates with SPC in RUE     Posture: forward shoulders, forward head posture     Passive Range of Motion:   Shoulder Right Left   Flexion 85* 160   Abduction 110* 155   ER at 45 35*     ER at 90   75   IR 45* 55      Active Range of Motion:   Shoulder Right Left   Flexion 87* 125   Abduction 72* 80                                              Strength:  Shoulder Right Left   Flexion 3+/5* 4/5   Abduction 3+/5* 4/5   ER 3/5* 4-/5   IR 4-/5* 4/5                              Joint Mobility: hypomobility R GHJ posteriorly/inferiorly     Palpation: TTP at biceps tendon, supraspinatus, infraspinatus, UT     CMS Impairment/Limitation/Restriction for FOTO " "Survey     Therapist reviewed FOTO scores for Jessica Floyd on 2/15/2023.   FOTO documents entered into [employer] - see Media section.     Limitation Score: 60%  Predicted Goal: 33%     Category: Mobility     TREATMENT     Jessica received the bolded treatments listed below:      Patient received therapeutic exercises for 8 minutes for improved strength and AROM including:  Shoulder isometrics flx/IR 10x5"  Shoulder pendulum circles x20 ea  Table slides flx/abd x20 ea  +Shoulder pendulum hang 2'    Patient received manual therapeutic technique for 34 minutes for improved soft tissue and joint mobility including:  PROM R shoulder  STM to R shoulder    Patient received neuromuscular reeducation for 00 minutes for improved proprioception and balance including:        Patient received therapeutic activities for 00 minutes for improved tolerance to functional activities including:        PATIENT EDUCATION AND HOME EXERCISES     Home Exercises Provided and Patient Education Provided     Education provided:   PT educated pt on importance of compliance with their HEP this visit.     Written Home Exercises Provided: Patient instructed to cont prior HEP. Exercises were reviewed and Jessica was able to demonstrate them prior to the end of the session.  Jessica demonstrated fair  understanding of the education provided. See EMR under Patient Instructions for exercises provided during therapy sessions    ASSESSMENT   Pt presents to clinic with increased pain in R shoulder and had poor tolerance to today's session. Pt experienced significant pain with PROM in all directions with heavy muscle guarding. Very minimal improvement in allowed range by the end of session compared to the beginning. Pt requested "TLC" in the form of massage. STM was applied to shoulder but unable to accomplish much due to increased tenderness to touch. Pt unable to finish isometric exercises due to pain and ended pendulums early for the same reason.    Jessica Is " not progressing well towards her goals.   Pt prognosis is Fair.     Pt will continue to benefit from skilled outpatient physical therapy to address the deficits listed in the problem list box on initial evaluation, provide pt/family education and to maximize pt's level of independence in the home and community environment.     Pt's spiritual, cultural and educational needs considered and pt agreeable to plan of care and goals.     Anticipated barriers to physical therapy: None    Goals:    Short Term Goals:  4 weeks  1.Report decreased R shoulder pain pain < / =  6/10  to increase tolerance for reaching  2. Increase PROM to 120 degrees R shoulder flexion   3. Increased strength by 1/3 MMT grade in R shoulder abduction to increase tolerance for ADL and work activities.  4. Pt to tolerate HEP to improve ROM and independence with ADL's     Long Term Goals: 8 weeks  1.Report decreased R shoulder pain  < / =  3 /10  to increase tolerance for lifting  2.Increase AROM to 150 deg R shoulder flexion  3.Increase strength to >/= 4/5 in R shoulder abduction to increase tolerance for ADL and work activities.  4. Pt goal: perform ADLs without shoulder pain.  5. Pt will have improved gcode of CJ (20-40% limited) on FOTO shoulder in order to demonstrate true functional improvement.    PLAN   Plan of care Certification: 2/15/2023 to 4/12/2023    Improve R shoulder ROM and strength     Outpatient Physical Therapy 2 times weekly for 8 weeks to include the following interventions: Therapeutic Exercises, Manual Therapeutic Technique, Neuromuscular Re Education, Therapeutic Activities. Modalities, Kinesiotape prn, and Functional Dry Needling as needed.      Vlad Lopez III, PTA

## 2023-03-02 NOTE — PROGRESS NOTES
[facility]    Chief Complaint  Chief Complaint   Patient presents with    Fatigue       HPI:    She comes in because her legs have been feeling heavy and she has been fatigued.  All this started not long after what seems have been a rather significant nosebleed.  She was found to be anemic.  Repeat blood work 2 days ago shows that she is persistently anemic but stable.  Some exertional dyspnea.  No paroxysmal nocturnal dyspnea or orthopnea.  No dependent edema.  No palpitations.  No angina.    Medications  Current Outpatient Medications   Medication Sig Dispense Refill    allopurinoL (ZYLOPRIM) 100 MG tablet Take 0.5 tablets (50 mg total) by mouth once daily. 30 tablet 11    amLODIPine (NORVASC) 10 MG tablet TAKE 1 TABLET BY MOUTH EVERY DAY 90 tablet 3    amoxicillin (AMOXIL) 500 MG capsule TAKE 2 CAPSULES BY MOUTH NOW, THEN ONE CAPSULE THREE TIMES A DAY UNTIL COMPLETELY FINISHED      atorvastatin (LIPITOR) 80 MG tablet TAKE 1 TABLET BY MOUTH EVERY DAY 90 tablet 3    carvediloL (COREG) 12.5 MG tablet TAKE 1 TABLET BY MOUTH TWICE A DAY WITH MEALS 180 tablet 3    colchicine (MITIGARE) 0.6 mg Cap Take 1 capsule (0.6 mg total) by mouth once daily. 30 capsule 11    fluticasone propionate (FLONASE) 50 mcg/actuation nasal spray SPRAY ONCE INTO EACH NOSTRIL ONCE DAILY 16 mL 3    hydroCHLOROthiazide (HYDRODIURIL) 25 MG tablet TAKE 1 TABLET BY MOUTH EVERY DAY 90 tablet 3    hydrocortisone 2.5 % ointment as needed.      ibuprofen (ADVIL,MOTRIN) 600 MG tablet Take 1 tablet (600 mg total) by mouth every 6 (six) hours as needed for Pain. 20 tablet 0    ketoconazole (NIZORAL) 2 % cream APPLY BETWEEN TOES TWICE DAILY X 2 WKS      LIDOcaine (LIDODERM) 5 % Place 1 patch onto the skin once daily. Remove & Discard patch within 12 hours or as directed by MD 15 patch 0    methocarbamoL (ROBAXIN) 750 MG Tab Take 1 tablet (750 mg total) by mouth 3 (three) times daily. 10 tablet 0    oxyCODONE-acetaminophen (PERCOCET) 5-325  mg per tablet Take 1 tablet by mouth every 6 (six) hours as needed for Pain. 12 tablet 0    pantoprazole (PROTONIX) 40 MG tablet TAKE 1 TABLET BY MOUTH EVERY DAY 90 tablet 0    promethazine (PHENERGAN) 25 MG tablet Take 1 tablet (25 mg total) by mouth every 6 (six) hours as needed for Nausea. 12 tablet 0     No current facility-administered medications for this visit.     Facility-Administered Medications Ordered in Other Visits   Medication Dose Route Frequency Provider Last Rate Last Admin    sodium hyaluronate (EUFLEXXA) 10 mg/mL(mw 2.4 -3.6 million) injection 20 mg  20 mg Intra-articular  Armani Cai PA-C   20 mg at 22 0830        History  Past Medical History:   Diagnosis Date    Allergic rhinitis     Amblyopia     Carotid stenosis     Coronary atherosclerosis     Outside [facility] 2014: 20% mLAD, 50% mCx, 20%, mRCA    Dyslipidemia     ESBL (extended spectrum beta-lactamase) producing bacteria infection     UTI    Hypertension     Nausea and vomiting 2017    Subarachnoid hemorrhage due to ruptured aneurysm      Past Surgical History:   Procedure Laterality Date    ADENOIDECTOMY      ANTERIOR CRUCIATE LIGAMENT REPAIR      APPENDECTOMY      CATARACT EXTRACTION W/  INTRAOCULAR LENS IMPLANT Right 2022    Procedure: EXTRACTION, CATARACT, WITH IOL INSERTION;  Surgeon: Higinio Cristina MD;  Location: [facility] OR;  Service: Ophthalmology;  Laterality: Right;    CATARACT EXTRACTION W/  INTRAOCULAR LENS IMPLANT Left 2022    Procedure: EXTRACTION, CATARACT, WITH IOL INSERTION;  Surgeon: Higinio Cristina MD;  Location: [facility] OR;  Service: Ophthalmology;  Laterality: Left;    CEREBRAL ANEURYSM REPAIR      clip     SECTION      DENTAL SURGERY      EYE SURGERY Bilateral     cataract removal and lens implants    TONSILLECTOMY       Social History     Socioeconomic History    Marital status:    Occupational History    Occupation: Retired   Tobacco Use    Smoking status: Former      Packs/day: 0.50     Years: 20.00     Pack years: 10.00     Types: Cigarettes     Quit date: 1999     Years since quittin.1    Smokeless tobacco: Never   Substance and Sexual Activity    Alcohol use: Yes     Alcohol/week: 7.0 standard drinks     Types: 7 Glasses of wine per week    Drug use: No    Sexual activity: Yes     Partners: Male   Social History Narrative    .  Has 3 grown daughters.   lives in [facility].       Social Determinants of Health     Financial Resource Strain: Low Risk     Difficulty of Paying Living Expenses: Not hard at all   Food Insecurity: No Food Insecurity    Worried About Running Out of Food in the Last Year: Never true    Ran Out of Food in the Last Year: Never true   Transportation Needs: Unmet Transportation Needs    Lack of Transportation (Medical): Yes    Lack of Transportation (Non-Medical): Yes   Physical Activity: Inactive    Days of Exercise per Week: 0 days    Minutes of Exercise per Session: 0 min   Stress: Stress Concern Present    Feeling of Stress : To some extent   Social Connections: Moderately Integrated    Frequency of Communication with Friends and Family: More than three times a week    Frequency of Social Gatherings with Friends and Family: More than three times a week    Attends Mu-ism Services: More than 4 times per year    Active Member of Clubs or Organizations: No    Attends Club or Organization Meetings: Never    Marital Status:    Housing Stability: Unknown    Unable to Pay for Housing in the Last Year: No    Unstable Housing in the Last Year: No     Family History   Problem Relation Age of Onset    Hypertension Mother     Aneurysm Mother     Hypertension Father     Alcohol abuse Father     Kidney disease Brother     Heart disease Brother     Gout Brother     No Known Problems Daughter     No Known Problems Daughter     No Known Problems Daughter         Allergies  Review of patient's allergies indicates:  No Known  Allergies    Review of Systems   Review of Systems   Constitutional: Positive for malaise/fatigue. Negative for weight gain and weight loss.   Eyes:  Negative for visual disturbance.   Cardiovascular:  Negative for chest pain, claudication, cyanosis, dyspnea on exertion, irregular heartbeat, leg swelling, near-syncope, orthopnea, palpitations, paroxysmal nocturnal dyspnea and syncope.   Respiratory:  Negative for cough, hemoptysis, shortness of breath, sleep disturbances due to breathing and wheezing.    Hematologic/Lymphatic: Negative for bleeding problem. Does not bruise/bleed easily.   Skin:  Negative for poor wound healing.   Musculoskeletal:  Negative for muscle cramps and myalgias.   Gastrointestinal:  Negative for abdominal pain, anorexia, diarrhea, heartburn, hematemesis, hematochezia, melena, nausea and vomiting.   Genitourinary:  Negative for hematuria and nocturia.   Neurological:  Negative for excessive daytime sleepiness, dizziness, focal weakness, light-headedness and weakness.     Physical Exam  Vitals:    03/02/23 0949   BP: 124/70   Pulse: 82     Wt Readings from Last 1 Encounters:   03/02/23 58.4 kg (128 lb 11.2 oz)     Physical Exam  Constitutional:       General: She is not in acute distress.     Appearance: She is not toxic-appearing or diaphoretic.   HENT:      Head: Normocephalic and atraumatic.   Eyes:      General: No scleral icterus.     Conjunctiva/sclera: Conjunctivae normal.   Neck:      Thyroid: No thyromegaly.      Vascular: No carotid bruit, hepatojugular reflux or JVD.      Trachea: Trachea normal.   Cardiovascular:      Rate and Rhythm: Normal rate and regular rhythm. No extrasystoles are present.     Chest Wall: PMI is not displaced.      Pulses:           Carotid pulses are 2+ on the right side and 2+ on the left side.       Radial pulses are 2+ on the right side and 2+ on the left side.        Dorsalis pedis pulses are 2+ on the right side and 2+ on the left side.         Posterior tibial pulses are 2+ on the right side and 2+ on the left side.      Heart sounds: S1 normal and S2 normal. No murmur heard.    No S3 or S4 sounds.   Pulmonary:      Effort: No accessory muscle usage or respiratory distress.      Breath sounds: No decreased breath sounds, wheezing, rhonchi or rales.   Abdominal:      General: Bowel sounds are normal. There is no abdominal bruit.      Palpations: Abdomen is soft. There is no hepatomegaly, splenomegaly or pulsatile mass.      Tenderness: There is no abdominal tenderness.   Musculoskeletal:         General: No tenderness or deformity.      Right lower leg: No edema.      Left lower leg: No edema.   Skin:     General: Skin is warm and dry.      Coloration: Skin is pale. Skin is not cyanotic.      Nails: There is no clubbing.   Neurological:      General: No focal deficit present.      Mental Status: She is alert and oriented to person, place, and time.   Psychiatric:         Speech: Speech normal.         Behavior: Behavior normal. Behavior is cooperative.       Labs  Lab Visit on 02/28/2023   Component Date Value Ref Range Status    WBC 02/28/2023 8.12  3.90 - 12.70 K/uL Final    RBC 02/28/2023 2.85 (L)  4.00 - 5.40 M/uL Final    Hemoglobin 02/28/2023 8.5 (L)  12.0 - 16.0 g/dL Final    Hematocrit 02/28/2023 28.5 (L)  37.0 - 48.5 % Final    MCV 02/28/2023 100 (H)  82 - 98 fL Final    MCH 02/28/2023 29.8  27.0 - 31.0 pg Final    MCHC 02/28/2023 29.8 (L)  32.0 - 36.0 g/dL Final    RDW 02/28/2023 16.1 (H)  11.5 - 14.5 % Final    Platelets 02/28/2023 352  150 - 450 K/uL Final    MPV 02/28/2023 11.5  9.2 - 12.9 fL Final    Immature Granulocytes 02/28/2023 0.5  0.0 - 0.5 % Final    Gran # (ANC) 02/28/2023 5.4  1.8 - 7.7 K/uL Final    Immature Grans (Abs) 02/28/2023 0.04  0.00 - 0.04 K/uL Final    Comment: Mild elevation in immature granulocytes is non specific and   can be seen in a variety of conditions including stress response,   acute inflammation, trauma and  pregnancy. Correlation with other   laboratory and clinical findings is essential.      Lymph # 02/28/2023 1.2  1.0 - 4.8 K/uL Final    Mono # 02/28/2023 1.3 (H)  0.3 - 1.0 K/uL Final    Eos # 02/28/2023 0.2  0.0 - 0.5 K/uL Final    Baso # 02/28/2023 0.06  0.00 - 0.20 K/uL Final    nRBC 02/28/2023 0  0 /100 WBC Final    Gran % 02/28/2023 65.9  38.0 - 73.0 % Final    Lymph % 02/28/2023 14.8 (L)  18.0 - 48.0 % Final    Mono % 02/28/2023 16.3 (H)  4.0 - 15.0 % Final    Eosinophil % 02/28/2023 1.8  0.0 - 8.0 % Final    Basophil % 02/28/2023 0.7  0.0 - 1.9 % Final    Differential Method 02/28/2023 Automated   Final    Sodium 02/28/2023 139  136 - 145 mmol/L Final    Potassium 02/28/2023 3.6  3.5 - 5.1 mmol/L Final    Chloride 02/28/2023 98  95 - 110 mmol/L Final    CO2 02/28/2023 30 (H)  23 - 29 mmol/L Final    Glucose 02/28/2023 93  70 - 110 mg/dL Final    BUN 02/28/2023 10  8 - 23 mg/dL Final    Creatinine 02/28/2023 1.0  0.5 - 1.4 mg/dL Final    Calcium 02/28/2023 9.1  8.7 - 10.5 mg/dL Final    Total Protein 02/28/2023 6.7  6.0 - 8.4 g/dL Final    Albumin 02/28/2023 3.6  3.5 - 5.2 g/dL Final    Total Bilirubin 02/28/2023 0.5  0.1 - 1.0 mg/dL Final    Comment: For infants and newborns, interpretation of results should be based  on gestational age, weight and in agreement with clinical  observations.    Premature Infant recommended reference ranges:  Up to 24 hours.............<8.0 mg/dL  Up to 48 hours............<12.0 mg/dL  3-5 days..................<15.0 mg/dL  6-29 days.................<15.0 mg/dL      Alkaline Phosphatase 02/28/2023 69  55 - 135 U/L Final    AST 02/28/2023 45 (H)  10 - 40 U/L Final    ALT 02/28/2023 25  10 - 44 U/L Final    Anion Gap 02/28/2023 11  8 - 16 mmol/L Final    eGFR 02/28/2023 59.5 (A)  >60 mL/min/1.73 m^2 Final   Admission on 02/19/2023, Discharged on 02/19/2023   Component Date Value Ref Range Status    WBC 02/19/2023 9.46  3.90 - 12.70 K/uL Final    RBC 02/19/2023 2.93 (L)  4.00 -  5.40 M/uL Final    Hemoglobin 02/19/2023 9.4 (L)  12.0 - 16.0 g/dL Final    Hematocrit 02/19/2023 29.3 (L)  37.0 - 48.5 % Final    MCV 02/19/2023 100 (H)  82 - 98 fL Final    MCH 02/19/2023 32.1 (H)  27.0 - 31.0 pg Final    MCHC 02/19/2023 32.1  32.0 - 36.0 g/dL Final    RDW 02/19/2023 16.0 (H)  11.5 - 14.5 % Final    Platelets 02/19/2023 267  150 - 450 K/uL Final    MPV 02/19/2023 10.8  9.2 - 12.9 fL Final    Immature Granulocytes 02/19/2023 0.4  0.0 - 0.5 % Final    Gran # (ANC) 02/19/2023 6.4  1.8 - 7.7 K/uL Final    Immature Grans (Abs) 02/19/2023 0.04  0.00 - 0.04 K/uL Final    Comment: Mild elevation in immature granulocytes is non specific and   can be seen in a variety of conditions including stress response,   acute inflammation, trauma and pregnancy. Correlation with other   laboratory and clinical findings is essential.      Lymph # 02/19/2023 1.4  1.0 - 4.8 K/uL Final    Mono # 02/19/2023 1.4 (H)  0.3 - 1.0 K/uL Final    Eos # 02/19/2023 0.2  0.0 - 0.5 K/uL Final    Baso # 02/19/2023 0.06  0.00 - 0.20 K/uL Final    nRBC 02/19/2023 0  0 /100 WBC Final    Gran % 02/19/2023 67.4  38.0 - 73.0 % Final    Lymph % 02/19/2023 15.0 (L)  18.0 - 48.0 % Final    Mono % 02/19/2023 15.0  4.0 - 15.0 % Final    Eosinophil % 02/19/2023 1.6  0.0 - 8.0 % Final    Basophil % 02/19/2023 0.6  0.0 - 1.9 % Final    Differential Method 02/19/2023 Automated   Final   Admission on 02/17/2023, Discharged on 02/17/2023   Component Date Value Ref Range Status    WBC 02/17/2023 9.19  3.90 - 12.70 K/uL Final    RBC 02/17/2023 3.54 (L)  4.00 - 5.40 M/uL Final    Hemoglobin 02/17/2023 11.3 (L)  12.0 - 16.0 g/dL Final    Hematocrit 02/17/2023 34.5 (L)  37.0 - 48.5 % Final    MCV 02/17/2023 98  82 - 98 fL Final    MCH 02/17/2023 31.9 (H)  27.0 - 31.0 pg Final    MCHC 02/17/2023 32.8  32.0 - 36.0 g/dL Final    RDW 02/17/2023 15.8 (H)  11.5 - 14.5 % Final    Platelets 02/17/2023 309  150 - 450 K/uL Final    MPV 02/17/2023 10.3  9.2 - 12.9 fL  Final    Immature Granulocytes 02/17/2023 0.5  0.0 - 0.5 % Final    Gran # (ANC) 02/17/2023 5.8  1.8 - 7.7 K/uL Final    Immature Grans (Abs) 02/17/2023 0.05 (H)  0.00 - 0.04 K/uL Final    Comment: Mild elevation in immature granulocytes is non specific and   can be seen in a variety of conditions including stress response,   acute inflammation, trauma and pregnancy. Correlation with other   laboratory and clinical findings is essential.      Lymph # 02/17/2023 1.7  1.0 - 4.8 K/uL Final    Mono # 02/17/2023 1.3 (H)  0.3 - 1.0 K/uL Final    Eos # 02/17/2023 0.2  0.0 - 0.5 K/uL Final    Baso # 02/17/2023 0.06  0.00 - 0.20 K/uL Final    nRBC 02/17/2023 0  0 /100 WBC Final    Gran % 02/17/2023 63.4  38.0 - 73.0 % Final    Lymph % 02/17/2023 18.2  18.0 - 48.0 % Final    Mono % 02/17/2023 14.6  4.0 - 15.0 % Final    Eosinophil % 02/17/2023 2.6  0.0 - 8.0 % Final    Basophil % 02/17/2023 0.7  0.0 - 1.9 % Final    Differential Method 02/17/2023 Automated   Final    Sodium 02/17/2023 137  136 - 145 mmol/L Final    Potassium 02/17/2023 2.9 (L)  3.5 - 5.1 mmol/L Final    Chloride 02/17/2023 97  95 - 110 mmol/L Final    CO2 02/17/2023 26  23 - 29 mmol/L Final    Glucose 02/17/2023 103  70 - 110 mg/dL Final    BUN 02/17/2023 20  8 - 23 mg/dL Final    Creatinine 02/17/2023 1.0  0.5 - 1.4 mg/dL Final    Calcium 02/17/2023 9.2  8.7 - 10.5 mg/dL Final    Anion Gap 02/17/2023 14  8 - 16 mmol/L Final    eGFR 02/17/2023 59 (A)  >60 mL/min/1.73 m^2 Final   Lab Visit on 10/11/2022   Component Date Value Ref Range Status    WBC 10/11/2022 7.27  3.90 - 12.70 K/uL Final    RBC 10/11/2022 3.43 (L)  4.00 - 5.40 M/uL Final    Hemoglobin 10/11/2022 11.8 (L)  12.0 - 16.0 g/dL Final    Hematocrit 10/11/2022 36.0 (L)  37.0 - 48.5 % Final    MCV 10/11/2022 105 (H)  82 - 98 fL Final    MCH 10/11/2022 34.4 (H)  27.0 - 31.0 pg Final    MCHC 10/11/2022 32.8  32.0 - 36.0 g/dL Final    RDW 10/11/2022 16.4 (H)  11.5 - 14.5 % Final    Platelets 10/11/2022  305  150 - 450 K/uL Final    MPV 10/11/2022 10.4  9.2 - 12.9 fL Final    Immature Granulocytes 10/11/2022 0.4  0.0 - 0.5 % Final    Gran # (ANC) 10/11/2022 3.3  1.8 - 7.7 K/uL Final    Immature Grans (Abs) 10/11/2022 0.03  0.00 - 0.04 K/uL Final    Comment: Mild elevation in immature granulocytes is non specific and   can be seen in a variety of conditions including stress response,   acute inflammation, trauma and pregnancy. Correlation with other   laboratory and clinical findings is essential.      Lymph # 10/11/2022 2.3  1.0 - 4.8 K/uL Final    Mono # 10/11/2022 1.3 (H)  0.3 - 1.0 K/uL Final    Eos # 10/11/2022 0.3  0.0 - 0.5 K/uL Final    Baso # 10/11/2022 0.08  0.00 - 0.20 K/uL Final    nRBC 10/11/2022 0  0 /100 WBC Final    Gran % 10/11/2022 45.1  38.0 - 73.0 % Final    Lymph % 10/11/2022 31.4  18.0 - 48.0 % Final    Mono % 10/11/2022 17.6 (H)  4.0 - 15.0 % Final    Eosinophil % 10/11/2022 4.4  0.0 - 8.0 % Final    Basophil % 10/11/2022 1.1  0.0 - 1.9 % Final    Differential Method 10/11/2022 Automated   Final    Sodium 10/11/2022 136  136 - 145 mmol/L Final    Potassium 10/11/2022 3.4 (L)  3.5 - 5.1 mmol/L Final    Chloride 10/11/2022 96  95 - 110 mmol/L Final    CO2 10/11/2022 30 (H)  23 - 29 mmol/L Final    Glucose 10/11/2022 79  70 - 110 mg/dL Final    BUN 10/11/2022 15  8 - 23 mg/dL Final    Creatinine 10/11/2022 1.0  0.5 - 1.4 mg/dL Final    Calcium 10/11/2022 9.4  8.7 - 10.5 mg/dL Final    Total Protein 10/11/2022 6.9  6.0 - 8.4 g/dL Final    Albumin 10/11/2022 4.0  3.5 - 5.2 g/dL Final    Total Bilirubin 10/11/2022 0.8  0.1 - 1.0 mg/dL Final    Comment: For infants and newborns, interpretation of results should be based  on gestational age, weight and in agreement with clinical  observations.    Premature Infant recommended reference ranges:  Up to 24 hours.............<8.0 mg/dL  Up to 48 hours............<12.0 mg/dL  3-5 days..................<15.0 mg/dL  6-29 days.................<15.0 mg/dL       Alkaline Phosphatase 10/11/2022 55  55 - 135 U/L Final    AST 10/11/2022 31  10 - 40 U/L Final    ALT 10/11/2022 25  10 - 44 U/L Final    Anion Gap 10/11/2022 10  8 - 16 mmol/L Final    eGFR 10/11/2022 59.5 (A)  >60 mL/min/1.73 m^2 Final    Uric Acid 10/11/2022 6.5 (H)  2.4 - 5.7 mg/dL Final       Imaging  CT Arthrogram Shoulder Right    Result Date: 2/3/2023  EXAMINATION: CT ARTHROGRAM SHOULDER RIGHT CLINICAL HISTORY: Shoulder pain, rotator cuff disorder suspected, xray done;  Pain in right shoulder TECHNIQUE: Routine right multiplanar CT arthrogram shoulder performed after the intra-articular injection of contrast. COMPARISON: None FINDINGS: Rotator cuff: There is a large full-thickness rotator cuff tear involving the entire supra and infraspinatus tendons.  There is tendon retraction to the glenoid rim.  The teres minor and subscapularis are intact.  There is mild volume loss of both supra and infraspinatus muscles.  High-riding humeral head noted abutting the acromion. Mild AC joint arthropathy. The biceps tendon not identified, probably torn. Generalized cartilage thinning/joint space narrowing noted with mild osteophytosis along the inferior margin of the humeral head.  No fractures or marrow replacement process.  No evidence of osteomyelitis.  No axillary lymphadenopathy.  Emphysematous changes noted in the right lung apex.     Large full-thickness rotator cuff tear, involving the infraspinatus and supraspinatus tendons, as above. This report was flagged in Epic as abnormal. Electronically signed by: Musa Maloney MD Date:    02/03/2023 Time:    13:22    XR ARTHROGRAM SHOULDER RIGHT, INJECTION ONLY WITH CT TO FOLLOW (XPD)    Result Date: 2/3/2023  EXAMINATION: XR ARTHROGRAM SHOULDER RIGHT, INJECTION ONLY WITH CT TO FOLLOW (XPD) CLINICAL HISTORY: Pain in right shoulder TECHNIQUE: Written informed consent was obtained and the patient was prepped and draped in a sterile fashion.  The right shoulder was localized  under fluoroscopy and 1% lidocaine was used to achieve local anesthesia.  A 22-gauge spinal needle was inserted into the joint via an anterior approach and position was confirmed with low resistance injection.  Solution of 12 mL dilated Omnipaque 300 (50% contrast: 50% normal saline) was injected.  Needle was removed and adequate hemostasis was achieved. Fluoroscopy time = 27 seconds COMPARISON: Shoulder radiograph 01/27/2020 FINDINGS: Intra-articular contrast is noted.     Successful right shoulder arthrogram.  CT report to follow. Electronically signed by resident: Misha Greenwood Date:    02/03/2023 Time:    13:03 Electronically signed by: Musa Maloney MD Date:    02/03/2023 Time:    15:02      Assessment  1. Acute blood loss anemia  I suspect her symptoms are related to anemia.    2. Stenosis of right carotid artery  Continue regular follow-up    3. Atherosclerosis of native coronary artery of native heart without angina pectoris  No angina and nonobstructive disease    4. Hypercholesterolemia  Controlled    5. Primary hypertension  Does not follow prescribed dosing but self titrate her medications.    6. Stage 3a chronic kidney disease  Seems stable      Plan and Discussion    Suspect most of her nonspecific symptoms are related to anemia.  Recommended iron supplementation and follow-up with Dr. Olmedo.    The 10-year ASCVD risk score (Joyce DK, et al., 2019) is: 15.7%    Values used to calculate the score:      Age: 72 years      Sex: Female      Is Non- : No      Diabetic: No      Tobacco smoker: No      Systolic Blood Pressure: 124 mmHg      Is BP treated: Yes      HDL Cholesterol: 69 mg/dL      Total Cholesterol: 176 mg/dL     Follow Up  Follow up in about 1 year (around 3/2/2024).      Michael Lal MD

## 2023-03-03 ENCOUNTER — CLINICAL SUPPORT (OUTPATIENT)
Dept: REHABILITATION | Facility: OTHER | Age: 73
End: 2023-03-03
Payer: MEDICARE

## 2023-03-03 ENCOUNTER — PATIENT MESSAGE (OUTPATIENT)
Dept: SPORTS MEDICINE | Facility: CLINIC | Age: 73
End: 2023-03-03
Payer: MEDICARE

## 2023-03-03 DIAGNOSIS — M19.011 LOCALIZED PRIMARY OSTEOARTHRITIS OF RIGHT SHOULDER REGION: ICD-10-CM

## 2023-03-03 DIAGNOSIS — S46.011A TRAUMATIC COMPLETE TEAR OF RIGHT ROTATOR CUFF, INITIAL ENCOUNTER: Primary | ICD-10-CM

## 2023-03-03 PROCEDURE — 97140 MANUAL THERAPY 1/> REGIONS: CPT | Mod: HCNC,PN,CQ

## 2023-03-03 PROCEDURE — 97110 THERAPEUTIC EXERCISES: CPT | Mod: HCNC,PN,CQ

## 2023-03-05 ENCOUNTER — PATIENT MESSAGE (OUTPATIENT)
Dept: SPORTS MEDICINE | Facility: CLINIC | Age: 73
End: 2023-03-05
Payer: MEDICARE

## 2023-03-06 ENCOUNTER — DOCUMENTATION ONLY (OUTPATIENT)
Dept: REHABILITATION | Facility: OTHER | Age: 73
End: 2023-03-06
Payer: MEDICARE

## 2023-03-06 NOTE — PROGRESS NOTES
PT/PTA met face to face to discuss pt's treatment plan and progress towards established goals. Pt will be seen by a physical therapist minimally every 6th visit or every 30 days.    Vlad Lopez III, PTA

## 2023-03-07 ENCOUNTER — CLINICAL SUPPORT (OUTPATIENT)
Dept: REHABILITATION | Facility: OTHER | Age: 73
End: 2023-03-07
Payer: MEDICARE

## 2023-03-07 DIAGNOSIS — S46.011A TRAUMATIC COMPLETE TEAR OF RIGHT ROTATOR CUFF, INITIAL ENCOUNTER: Primary | ICD-10-CM

## 2023-03-07 DIAGNOSIS — M19.011 LOCALIZED PRIMARY OSTEOARTHRITIS OF RIGHT SHOULDER REGION: ICD-10-CM

## 2023-03-07 PROCEDURE — 97010 HOT OR COLD PACKS THERAPY: CPT | Mod: HCNC,PN

## 2023-03-07 PROCEDURE — 97140 MANUAL THERAPY 1/> REGIONS: CPT | Mod: HCNC,PN

## 2023-03-07 PROCEDURE — 97110 THERAPEUTIC EXERCISES: CPT | Mod: HCNC,PN

## 2023-03-07 NOTE — PROGRESS NOTES
"[facility]   Physical Therapy Treatment Note     Name: Jessica Floyd  Clinic Number: [id number]    Therapy Diagnosis:   Encounter Diagnoses   Name Primary?    Traumatic complete tear of right rotator cuff, initial encounter      Localized primary osteoarthritis of right shoulder region      Physician: Armani Cai PA-C    Visit Date: 3/7/2023    Physician Orders: Physical Therapy Evaluate and Treat  Medical Diagnosis from Referral:   S46.011A (ICD-10-CM) - Traumatic complete tear of right rotator cuff, initial encounter   M19.011 (ICD-10-CM) - Localized primary osteoarthritis of right shoulder region      Evaluation Date: 2/15/2023  Authorization Period Expiration: 2/6/2024  Plan of Care Expiration: 2/15/2023 to 4/12/2023  Visit # / Visits authorized: 3/40   FOTO: 1/3  on 2/15/2023  PTA Visit #: 0/ 5    Precautions: Standard    Time In: 8:40 am  Time Out: 9:38 am  Total Billable Time: 58 minutes    SUBJECTIVE   Pt reports: she had a lot of pain after her previous visit along with a lot of pain over the weekend.  She has been trying her best to do exercise at home but continues to have pain.      She was not compliant with home exercise program.  Response to previous treatment: "I was in pain after"  Functional change: None today    Pain: 6/10 sitting still; 15/10 when moving arm  Location: R shoulder     OBJECTIVE     2/15/2023:  Observation: ambulates with SPC in RUE     Posture: forward shoulders, forward head posture     Passive Range of Motion:   Shoulder Right Left   Flexion 85* 160   Abduction 110* 155   ER at 45 35*     ER at 90   75   IR 45* 55      Active Range of Motion:   Shoulder Right Left   Flexion 87* 125   Abduction 72* 80                                              Strength:  Shoulder Right Left   Flexion 3+/5* 4/5   Abduction 3+/5* 4/5   ER 3/5* 4-/5   IR 4-/5* 4/5                              Joint Mobility: hypomobility R GHJ posteriorly/inferiorly     Palpation: " "TTP at biceps tendon, supraspinatus, infraspinatus, UT     CMS Impairment/Limitation/Restriction for FOTO Survey     Therapist reviewed FOTO scores for Jessica Floyd on 2/15/2023.   FOTO documents entered into [employer] - see Media section.     Limitation Score: 60%  Predicted Goal: 33%     Category: Mobility     TREATMENT     Jessica received the bolded treatments listed below:      Patient received therapeutic exercises for 10 minutes for improved strength and AROM including:  Shoulder isometrics flx/IR 10x5"  Shoulder pendulum circles x20 ea  Table slides flx/abd x20 ea  Shoulder pendulum hang 2'    Patient received manual therapeutic technique for 30 minutes for improved soft tissue and joint mobility including:  PROM R shoulder  STM to R shoulder    Patient received neuromuscular reeducation for 00 minutes for improved proprioception and balance including:        Patient received therapeutic activities for 00 minutes for improved tolerance to functional activities including:    Jessica received hot pack for 8 minutes to increase circulation and promote tissue healing.    Jessica received cold pack for 10 minutes to to decrease circulation, pain, and swelling.      PATIENT EDUCATION AND HOME EXERCISES     Home Exercises Provided and Patient Education Provided     Education provided:   PT educated pt on importance of compliance with their HEP this visit.     Written Home Exercises Provided: Patient instructed to cont prior HEP. Exercises were reviewed and Jessica was able to demonstrate them prior to the end of the session.  Jessica demonstrated fair  understanding of the education provided. See EMR under Patient Instructions for exercises provided during therapy sessions    ASSESSMENT   Pt continues to demonstrate limitations in shoulder ROM/strength, posture, and soft tissue mobility, which contributes to difficulty with ADLs such as reaching, lifting, dressing, cooking, etc.  Pt responds well to MHP at start of session " today, allowing her to tolerate more exercise and PROM.    Jessica Is not progressing well towards her goals.   Pt prognosis is Fair.     Pt will continue to benefit from skilled outpatient physical therapy to address the deficits listed in the problem list box on initial evaluation, provide pt/family education and to maximize pt's level of independence in the home and community environment.     Pt's spiritual, cultural and educational needs considered and pt agreeable to plan of care and goals.     Anticipated barriers to physical therapy: None    Goals:    Short Term Goals:  4 weeks  1.Report decreased R shoulder pain pain < / =  6/10  to increase tolerance for reaching  2. Increase PROM to 120 degrees R shoulder flexion   3. Increased strength by 1/3 MMT grade in R shoulder abduction to increase tolerance for ADL and work activities.  4. Pt to tolerate HEP to improve ROM and independence with ADL's     Long Term Goals: 8 weeks  1.Report decreased R shoulder pain  < / =  3 /10  to increase tolerance for lifting  2.Increase AROM to 150 deg R shoulder flexion  3.Increase strength to >/= 4/5 in R shoulder abduction to increase tolerance for ADL and work activities.  4. Pt goal: perform ADLs without shoulder pain.  5. Pt will have improved gcode of CJ (20-40% limited) on FOTO shoulder in order to demonstrate true functional improvement.    PLAN   Plan of care Certification: 2/15/2023 to 4/12/2023    Improve R shoulder ROM and strength     Outpatient Physical Therapy 2 times weekly for 8 weeks to include the following interventions: Therapeutic Exercises, Manual Therapeutic Technique, Neuromuscular Re Education, Therapeutic Activities. Modalities, Kinesiotape prn, and Functional Dry Needling as needed.      Vito Kaiser, PT, DPT

## 2023-03-08 ENCOUNTER — PATIENT MESSAGE (OUTPATIENT)
Dept: CARDIOLOGY | Facility: CLINIC | Age: 73
End: 2023-03-08
Payer: MEDICARE

## 2023-03-09 ENCOUNTER — CLINICAL SUPPORT (OUTPATIENT)
Dept: REHABILITATION | Facility: OTHER | Age: 73
End: 2023-03-09
Payer: MEDICARE

## 2023-03-09 PROCEDURE — 97110 THERAPEUTIC EXERCISES: CPT | Mod: HCNC,PN,CQ

## 2023-03-09 PROCEDURE — 97140 MANUAL THERAPY 1/> REGIONS: CPT | Mod: HCNC,PN,CQ

## 2023-03-09 NOTE — PROGRESS NOTES
"[facility]   Physical Therapy Treatment Note     Name: Jessica Floyd  Clinic Number: [id number]    Therapy Diagnosis:   Encounter Diagnoses   Name Primary?    Traumatic complete tear of right rotator cuff, initial encounter      Localized primary osteoarthritis of right shoulder region      Physician: Armani Cai PA-C    Visit Date: 3/9/2023    Physician Orders: Physical Therapy Evaluate and Treat  Medical Diagnosis from Referral:   S46.011A (ICD-10-CM) - Traumatic complete tear of right rotator cuff, initial encounter   M19.011 (ICD-10-CM) - Localized primary osteoarthritis of right shoulder region      Evaluation Date: 2/15/2023                 Authorization Period Expiration: 2/6/2024  Plan of Care Expiration: 2/15/2023 to 4/12/2023  Visit # / Visits authorized: 3/40   FOTO: 1/3  on 2/15/2023  PTA Visit #: 1/5    Precautions: Standard    Time In: 9:15 am  Time Out: 9:55 am  Total Billable Time: 40 minutes      SUBJECTIVE   Pt reports: Pain in the affected shoulder continues to be bad however not as intense as it was at the pior session. She would like some heat before starting range of motion today.     She was not compliant with home exercise program.  Response to previous treatment: "I was in pain after"  Functional change: None today    Pain: 6/10 sitting still   Location: R shoulder     OBJECTIVE     2/15/2023:  Observation: ambulates with SPC in RUE     Posture: forward shoulders, forward head posture     Passive Range of Motion:   Shoulder Right Left   Flexion 85* 160   Abduction 110* 155   ER at 45 35*     ER at 90   75   IR 45* 55      Active Range of Motion:   Shoulder Right Left   Flexion 87* 125   Abduction 72* 80                                              Strength:  Shoulder Right Left   Flexion 3+/5* 4/5   Abduction 3+/5* 4/5   ER 3/5* 4-/5   IR 4-/5* 4/5                              Joint Mobility: hypomobility R GHJ posteriorly/inferiorly     Palpation: TTP at " "biceps tendon, supraspinatus, infraspinatus, UT     CMS Impairment/Limitation/Restriction for FOTO Survey     Therapist reviewed FOTO scores for Jessica Floyd on 2/15/2023.   FOTO documents entered into [employer] - see Media section.     Limitation Score: 60%  Predicted Goal: 33%     Category: Mobility     TREATMENT     Jessica received the bolded treatments listed below:      Patient received therapeutic exercises for 10 minutes for improved strength and AROM including:  Shoulder isometrics flx/IR 10x5"                      Shoulder pendulum circles x20 ea                   Table slides flx/abd x20 ea     Shoulder pendulum hang 2'    Patient received manual therapeutic technique for 30 minutes for improved soft tissue and joint mobility including:  PROM R shoulder  STM to R shoulder    Patient received neuromuscular reeducation for 00 minutes for improved proprioception and balance including:        Patient received therapeutic activities for 00 minutes for improved tolerance to functional activities including:    Jessica received hot pack for 8 minutes to increase circulation and promote tissue healing.    Jessica received cold pack for 10 minutes to to decrease circulation, pain, and swelling.      PATIENT EDUCATION AND HOME EXERCISES     Home Exercises Provided and Patient Education Provided     Education provided:   PT educated pt on importance of compliance with their HEP this visit.     Written Home Exercises Provided: Patient instructed to cont prior HEP. Exercises were reviewed and Jessica was able to demonstrate them prior to the end of the session.  Jessica demonstrated fair  understanding of the education provided. See EMR under Patient Instructions for exercises provided during therapy sessions    ASSESSMENT   Patient continues to demonstrate limitations in shoulder range of motion and strength in all planes of motion which negatively impacts her abilities to perform ADL's that involve lifting and reaching. " Continued with MHP at beginning of session allowing improved tolerance.      Jessica Is not progressing well towards her goals.   Pt prognosis is Fair.     Pt will continue to benefit from skilled outpatient physical therapy to address the deficits listed in the problem list box on initial evaluation, provide pt/family education and to maximize pt's level of independence in the home and community environment.     Pt's spiritual, cultural and educational needs considered and pt agreeable to plan of care and goals.     Anticipated barriers to physical therapy: None    Goals:    Short Term Goals:  4 weeks  1.Report decreased R shoulder pain pain < / =  6/10  to increase tolerance for reaching  2. Increase PROM to 120 degrees R shoulder flexion   3. Increased strength by 1/3 MMT grade in R shoulder abduction to increase tolerance for ADL and work activities.  4. Pt to tolerate HEP to improve ROM and independence with ADL's     Long Term Goals: 8 weeks  1.Report decreased R shoulder pain  < / =  3 /10  to increase tolerance for lifting  2.Increase AROM to 150 deg R shoulder flexion  3.Increase strength to >/= 4/5 in R shoulder abduction to increase tolerance for ADL and work activities.  4. Pt goal: perform ADLs without shoulder pain.  5. Pt will have improved gcode of CJ (20-40% limited) on FOTO shoulder in order to demonstrate true functional improvement.    PLAN   Plan of care Certification: 2/15/2023 to 4/12/2023    Improve R shoulder ROM and strength     Outpatient Physical Therapy 2 times weekly for 8 weeks to include the following interventions: Therapeutic Exercises, Manual Therapeutic Technique, Neuromuscular Re Education, Therapeutic Activities. Modalities, Kinesiotape prn, and Functional Dry Needling as needed.      Romie Castellanos, PTA

## 2023-03-13 ENCOUNTER — PATIENT MESSAGE (OUTPATIENT)
Dept: ADMINISTRATIVE | Facility: OTHER | Age: 73
End: 2023-03-13
Payer: MEDICARE

## 2023-03-13 NOTE — PROGRESS NOTES
CHW - Outreach Attempt    Community Health Worker left a In Basket message for 1st attempt to contact patient regarding: SDOH  Community Health Worker to attempt to contact patient on: 3/20        CHW - Outreach Attempt    Community Health Worker left a voicemail message for 2nd attempt to contact patient regarding: SDOH  Community Health Worker to attempt to contact patient on: 4/10        CHW - Case Closure     Pt/Caregiver reported: SDOH was updated and completed by CHW  Pt/Caregiver denied any additional needs at this time and agrees with episode closure at this time.  Provided patient with Community Health Worker's contact information and encouraged him/her to contact this Community Health Worker if additional needs arise.

## 2023-03-14 ENCOUNTER — LAB VISIT (OUTPATIENT)
Dept: LAB | Facility: HOSPITAL | Age: 73
End: 2023-03-14
Attending: FAMILY MEDICINE
Payer: MEDICARE

## 2023-03-14 ENCOUNTER — OFFICE VISIT (OUTPATIENT)
Dept: PRIMARY CARE CLINIC | Facility: CLINIC | Age: 73
End: 2023-03-14
Attending: FAMILY MEDICINE
Payer: MEDICARE

## 2023-03-14 ENCOUNTER — CLINICAL SUPPORT (OUTPATIENT)
Dept: REHABILITATION | Facility: OTHER | Age: 73
End: 2023-03-14
Payer: MEDICARE

## 2023-03-14 VITALS
BODY MASS INDEX: 23.22 KG/M2 | OXYGEN SATURATION: 94 % | WEIGHT: 131.06 LBS | HEART RATE: 90 BPM | SYSTOLIC BLOOD PRESSURE: 138 MMHG | DIASTOLIC BLOOD PRESSURE: 74 MMHG | HEIGHT: 63 IN

## 2023-03-14 DIAGNOSIS — S46.011A TRAUMATIC COMPLETE TEAR OF RIGHT ROTATOR CUFF, INITIAL ENCOUNTER: Primary | ICD-10-CM

## 2023-03-14 DIAGNOSIS — D50.8 OTHER IRON DEFICIENCY ANEMIA: ICD-10-CM

## 2023-03-14 DIAGNOSIS — R21 RASH: ICD-10-CM

## 2023-03-14 DIAGNOSIS — R04.0 EPISTAXIS NOT DUE TO TRAUMA: Primary | ICD-10-CM

## 2023-03-14 DIAGNOSIS — I10 PRIMARY HYPERTENSION: ICD-10-CM

## 2023-03-14 LAB
BASOPHILS # BLD AUTO: 0.11 K/UL (ref 0–0.2)
BASOPHILS NFR BLD: 1.2 % (ref 0–1.9)
DIFFERENTIAL METHOD: ABNORMAL
EOSINOPHIL # BLD AUTO: 0.2 K/UL (ref 0–0.5)
EOSINOPHIL NFR BLD: 2.7 % (ref 0–8)
ERYTHROCYTE [DISTWIDTH] IN BLOOD BY AUTOMATED COUNT: 16.6 % (ref 11.5–14.5)
FERRITIN SERPL-MCNC: 24 NG/ML (ref 20–300)
HCT VFR BLD AUTO: 28.7 % (ref 37–48.5)
HGB BLD-MCNC: 8.9 G/DL (ref 12–16)
IMM GRANULOCYTES # BLD AUTO: 0.06 K/UL (ref 0–0.04)
IMM GRANULOCYTES NFR BLD AUTO: 0.7 % (ref 0–0.5)
IRON SERPL-MCNC: 16 UG/DL (ref 30–160)
LYMPHOCYTES # BLD AUTO: 2 K/UL (ref 1–4.8)
LYMPHOCYTES NFR BLD: 21.8 % (ref 18–48)
MCH RBC QN AUTO: 29 PG (ref 27–31)
MCHC RBC AUTO-ENTMCNC: 31 G/DL (ref 32–36)
MCV RBC AUTO: 94 FL (ref 82–98)
MONOCYTES # BLD AUTO: 1.4 K/UL (ref 0.3–1)
MONOCYTES NFR BLD: 15.5 % (ref 4–15)
NEUTROPHILS # BLD AUTO: 5.3 K/UL (ref 1.8–7.7)
NEUTROPHILS NFR BLD: 58.1 % (ref 38–73)
NRBC BLD-RTO: 0 /100 WBC
PLATELET # BLD AUTO: 369 K/UL (ref 150–450)
PMV BLD AUTO: 11.1 FL (ref 9.2–12.9)
RBC # BLD AUTO: 3.07 M/UL (ref 4–5.4)
SATURATED IRON: 3 % (ref 20–50)
TOTAL IRON BINDING CAPACITY: 546 UG/DL (ref 250–450)
TRANSFERRIN SERPL-MCNC: 369 MG/DL (ref 200–375)
WBC # BLD AUTO: 9.05 K/UL (ref 3.9–12.7)

## 2023-03-14 PROCEDURE — 84466 ASSAY OF TRANSFERRIN: CPT | Mod: HCNC | Performed by: FAMILY MEDICINE

## 2023-03-14 PROCEDURE — 3075F SYST BP GE 130 - 139MM HG: CPT | Mod: HCNC,CPTII,S$GLB, | Performed by: FAMILY MEDICINE

## 2023-03-14 PROCEDURE — 3288F FALL RISK ASSESSMENT DOCD: CPT | Mod: HCNC,CPTII,S$GLB, | Performed by: FAMILY MEDICINE

## 2023-03-14 PROCEDURE — 1126F AMNT PAIN NOTED NONE PRSNT: CPT | Mod: HCNC,CPTII,S$GLB, | Performed by: FAMILY MEDICINE

## 2023-03-14 PROCEDURE — 97140 MANUAL THERAPY 1/> REGIONS: CPT | Mod: HCNC,PN

## 2023-03-14 PROCEDURE — 99999 PR PBB SHADOW E&M-EST. PATIENT-LVL IV: CPT | Mod: PBBFAC,HCNC,, | Performed by: FAMILY MEDICINE

## 2023-03-14 PROCEDURE — 1160F PR REVIEW ALL MEDS BY PRESCRIBER/CLIN PHARMACIST DOCUMENTED: ICD-10-PCS | Mod: HCNC,CPTII,S$GLB, | Performed by: FAMILY MEDICINE

## 2023-03-14 PROCEDURE — 1159F MED LIST DOCD IN RCRD: CPT | Mod: HCNC,CPTII,S$GLB, | Performed by: FAMILY MEDICINE

## 2023-03-14 PROCEDURE — 3008F BODY MASS INDEX DOCD: CPT | Mod: HCNC,CPTII,S$GLB, | Performed by: FAMILY MEDICINE

## 2023-03-14 PROCEDURE — 3078F DIAST BP <80 MM HG: CPT | Mod: HCNC,CPTII,S$GLB, | Performed by: FAMILY MEDICINE

## 2023-03-14 PROCEDURE — 36415 COLL VENOUS BLD VENIPUNCTURE: CPT | Mod: HCNC,PN | Performed by: FAMILY MEDICINE

## 2023-03-14 PROCEDURE — 99215 OFFICE O/P EST HI 40 MIN: CPT | Mod: HCNC,S$GLB,, | Performed by: FAMILY MEDICINE

## 2023-03-14 PROCEDURE — 3008F PR BODY MASS INDEX (BMI) DOCUMENTED: ICD-10-PCS | Mod: HCNC,CPTII,S$GLB, | Performed by: FAMILY MEDICINE

## 2023-03-14 PROCEDURE — 99215 PR OFFICE/OUTPT VISIT, EST, LEVL V, 40-54 MIN: ICD-10-PCS | Mod: HCNC,S$GLB,, | Performed by: FAMILY MEDICINE

## 2023-03-14 PROCEDURE — 3075F PR MOST RECENT SYSTOLIC BLOOD PRESS GE 130-139MM HG: ICD-10-PCS | Mod: HCNC,CPTII,S$GLB, | Performed by: FAMILY MEDICINE

## 2023-03-14 PROCEDURE — 1160F RVW MEDS BY RX/DR IN RCRD: CPT | Mod: HCNC,CPTII,S$GLB, | Performed by: FAMILY MEDICINE

## 2023-03-14 PROCEDURE — 1159F PR MEDICATION LIST DOCUMENTED IN MEDICAL RECORD: ICD-10-PCS | Mod: HCNC,CPTII,S$GLB, | Performed by: FAMILY MEDICINE

## 2023-03-14 PROCEDURE — 3288F PR FALLS RISK ASSESSMENT DOCUMENTED: ICD-10-PCS | Mod: HCNC,CPTII,S$GLB, | Performed by: FAMILY MEDICINE

## 2023-03-14 PROCEDURE — 1126F PR PAIN SEVERITY QUANTIFIED, NO PAIN PRESENT: ICD-10-PCS | Mod: HCNC,CPTII,S$GLB, | Performed by: FAMILY MEDICINE

## 2023-03-14 PROCEDURE — 1101F PT FALLS ASSESS-DOCD LE1/YR: CPT | Mod: HCNC,CPTII,S$GLB, | Performed by: FAMILY MEDICINE

## 2023-03-14 PROCEDURE — 3078F PR MOST RECENT DIASTOLIC BLOOD PRESSURE < 80 MM HG: ICD-10-PCS | Mod: HCNC,CPTII,S$GLB, | Performed by: FAMILY MEDICINE

## 2023-03-14 PROCEDURE — 82728 ASSAY OF FERRITIN: CPT | Mod: HCNC | Performed by: FAMILY MEDICINE

## 2023-03-14 PROCEDURE — 1101F PR PT FALLS ASSESS DOC 0-1 FALLS W/OUT INJ PAST YR: ICD-10-PCS | Mod: HCNC,CPTII,S$GLB, | Performed by: FAMILY MEDICINE

## 2023-03-14 PROCEDURE — 97110 THERAPEUTIC EXERCISES: CPT | Mod: HCNC,PN

## 2023-03-14 PROCEDURE — 97010 HOT OR COLD PACKS THERAPY: CPT | Mod: HCNC,PN

## 2023-03-14 PROCEDURE — 85025 COMPLETE CBC W/AUTO DIFF WBC: CPT | Mod: HCNC | Performed by: FAMILY MEDICINE

## 2023-03-14 PROCEDURE — 99999 PR PBB SHADOW E&M-EST. PATIENT-LVL IV: ICD-10-PCS | Mod: PBBFAC,HCNC,, | Performed by: FAMILY MEDICINE

## 2023-03-14 NOTE — PROGRESS NOTES
"[facility]   Physical Therapy Treatment Note     Name: Jessica Floyd  Clinic Number: [id number]    Therapy Diagnosis:   Encounter Diagnoses   Name Primary?    Traumatic complete tear of right rotator cuff, initial encounter      Localized primary osteoarthritis of right shoulder region      Physician: Armani Cai PA-C    Visit Date: 3/14/2023    Physician Orders: Physical Therapy Evaluate and Treat  Medical Diagnosis from Referral:   S46.011A (ICD-10-CM) - Traumatic complete tear of right rotator cuff, initial encounter   M19.011 (ICD-10-CM) - Localized primary osteoarthritis of right shoulder region      Evaluation Date: 2/15/2023                 Authorization Period Expiration: 2/6/2024  Plan of Care Expiration: 2/15/2023 to 4/12/2023  Visit # / Visits authorized: 3/40   FOTO: 1/3  on 2/15/2023  PTA Visit #: 1/5    Precautions: Standard    Time In: 8:30 am  Time Out: 9:15 am  Total Billable Time: 45 minutes      SUBJECTIVE   Pt reports: Pain continues to gradually improve. Pt has been consistent with HEP performance and feels that her ROM is getting better and the exercises are getting easier.    She was not compliant with home exercise program.  Response to previous treatment: "I was in pain after"  Functional change: None today    Pain: 6/10 sitting still   Location: R shoulder     OBJECTIVE     2/15/2023:  Observation: ambulates with SPC in RUE     Posture: forward shoulders, forward head posture     Passive Range of Motion:   Shoulder Right Left   Flexion 85* 160   Abduction 110* 155   ER at 45 35*     ER at 90   75   IR 45* 55      Active Range of Motion:   Shoulder Right Left   Flexion 87* 125   Abduction 72* 80                                              Strength:  Shoulder Right Left   Flexion 3+/5* 4/5   Abduction 3+/5* 4/5   ER 3/5* 4-/5   IR 4-/5* 4/5                              Joint Mobility: hypomobility R GHJ posteriorly/inferiorly     Palpation: TTP at biceps " "tendon, supraspinatus, infraspinatus, UT     CMS Impairment/Limitation/Restriction for FOTO Survey     Therapist reviewed FOTO scores for Jessica Floyd on 2/15/2023.   FOTO documents entered into [employer] - see Media section.     Limitation Score: 60%  Predicted Goal: 33%     Category: Mobility     TREATMENT     Jessica received the bolded treatments listed below:      Patient received therapeutic exercises for 12 minutes for improved strength and AROM including:  Shoulder isometrics flx/IR 10x5"                      Shoulder pendulum circles x20 ea                   Table slides flx/abd x20 ea     Shoulder pendulum hang 2'    Patient received manual therapeutic technique for 15 minutes for improved soft tissue and joint mobility including:  PROM R shoulder  STM to R shoulder    Patient received neuromuscular reeducation for 00 minutes for improved proprioception and balance including:        Patient received therapeutic activities for 00 minutes for improved tolerance to functional activities including:    Jessica received hot pack for 8 minutes to increase circulation and promote tissue healing.    Jessica received cold pack for 10 minutes to to decrease circulation, pain, and swelling.      PATIENT EDUCATION AND HOME EXERCISES     Home Exercises Provided and Patient Education Provided     Education provided:   PT educated pt on importance of compliance with their HEP this visit.     Written Home Exercises Provided: Patient instructed to cont prior HEP. Exercises were reviewed and Jessica was able to demonstrate them prior to the end of the session.  Jessica demonstrated fair  understanding of the education provided. See EMR under Patient Instructions for exercises provided during therapy sessions    ASSESSMENT   Patient continues to demonstrate limitations in shoulder range of motion and strength in all planes of motion which negatively impacts her abilities to perform ADL's that involve lifting and reaching. Continued " with MHP at beginning of session allowing improved tolerance.  Pt reported pain with shoulder isometrics today, stopped this exercise early.      Jessica Is not progressing well towards her goals.   Pt prognosis is Fair.     Pt will continue to benefit from skilled outpatient physical therapy to address the deficits listed in the problem list box on initial evaluation, provide pt/family education and to maximize pt's level of independence in the home and community environment.     Pt's spiritual, cultural and educational needs considered and pt agreeable to plan of care and goals.     Anticipated barriers to physical therapy: None    Goals:    Short Term Goals:  4 weeks  1.Report decreased R shoulder pain pain < / =  6/10  to increase tolerance for reaching  2. Increase PROM to 120 degrees R shoulder flexion   3. Increased strength by 1/3 MMT grade in R shoulder abduction to increase tolerance for ADL and work activities.  4. Pt to tolerate HEP to improve ROM and independence with ADL's     Long Term Goals: 8 weeks  1.Report decreased R shoulder pain  < / =  3 /10  to increase tolerance for lifting  2.Increase AROM to 150 deg R shoulder flexion  3.Increase strength to >/= 4/5 in R shoulder abduction to increase tolerance for ADL and work activities.  4. Pt goal: perform ADLs without shoulder pain.  5. Pt will have improved gcode of CJ (20-40% limited) on FOTO shoulder in order to demonstrate true functional improvement.    PLAN   Plan of care Certification: 2/15/2023 to 4/12/2023    Improve R shoulder ROM and strength     Outpatient Physical Therapy 2 times weekly for 8 weeks to include the following interventions: Therapeutic Exercises, Manual Therapeutic Technique, Neuromuscular Re Education, Therapeutic Activities. Modalities, Kinesiotape prn, and Functional Dry Needling as needed.      Vito Kaiser, PT, DPT

## 2023-03-14 NOTE — PROGRESS NOTES
Subjective:       Patient ID: Jessica Floyd is a 72 y.o. female.    Chief Complaint: Follow-up, Hypertension, Anemia, and Fatigue    Pt presents today for f./u from 2 ED visits. Today she feels better and is taking iron supplementation. Needs labs    The patient was referred in ER follow-up for evaluation of epistaxis.  Patient had 3 ER visits at [facility] on 02/17 and 02/18/2023 with return on the same day on the 2nd visit.  She had nosebleed that started at initial visit with no prior history of nosebleeds.  She was on prophylactic daily aspirin that she stop as soon as the 1st bleed.  She was putting tissues in her nose on the 1st bleed.  She was given Afrin soaked gauze on the 1st ER visit and then told to put lidocaine with epinephrine on gauze on the 2nd ER visit with return and then she presented to [facility] ER desiring to see ENT on 02/19/2023 where she had a rhino rocket placed.  Patient had since had follow-up with urgent care ENT and shell met where rhino rocket was removed at 48 hours and septum was cauterized on the right.  Also went to ENT and was told that she needs scans and scopes. She returns today for follow up with me.  She has not had no nosebleeds since the cauterization.  She denies any rhinorrhea or thick mucus or fevers.    Pt does think that derm skin freeze might have triggered her nose bleed since it was in that side of her face near her nose      Follow-up  Associated symptoms include fatigue.   Hypertension    Anemia    Fatigue  Associated symptoms include fatigue.   Review of Systems   Constitutional:  Positive for activity change and fatigue.   All other systems reviewed and are negative.      Objective:      Physical Exam  Vitals reviewed.   Constitutional:       General: She is not in acute distress.     Appearance: Normal appearance. She is well-developed and normal weight. She is not ill-appearing, toxic-appearing or diaphoretic.   HENT:      Head: Normocephalic and  atraumatic.      Nose: Nose normal.   Eyes:      Extraocular Movements: Extraocular movements intact.      Conjunctiva/sclera: Conjunctivae normal.      Pupils: Pupils are equal, round, and reactive to light.   Neck:      Thyroid: No thyromegaly.   Cardiovascular:      Rate and Rhythm: Normal rate and regular rhythm.      Heart sounds: Normal heart sounds. No murmur heard.  Pulmonary:      Effort: Pulmonary effort is normal. No respiratory distress.      Breath sounds: Normal breath sounds. No wheezing or rales.   Chest:      Chest wall: No tenderness.   Musculoskeletal:         General: Normal range of motion.      Cervical back: Normal range of motion and neck supple.      Right lower leg: No edema.      Left lower leg: No edema.   Lymphadenopathy:      Cervical: No cervical adenopathy.   Skin:     General: Skin is warm.      Coloration: Skin is not jaundiced or pale.      Findings: No bruising, erythema, lesion or rash.   Neurological:      Mental Status: She is alert and oriented to person, place, and time.      Motor: Weakness present.   Psychiatric:         Behavior: Behavior normal.         Thought Content: Thought content normal.         Judgment: Judgment normal.       Assessment:       Problem List Items Addressed This Visit    None  Visit Diagnoses       Rash    -  Primary    Relevant Orders    Ambulatory referral/consult to Dermatology    Other iron deficiency anemia        Relevant Orders    CBC Auto Differential    FERRITIN    Iron and TIBC              Plan:       Will get iron studies. Recommend rest, hydration, iron rich foods and continue with tabs.   Labs pending  Denies GYN or GI bleed    RTC prn 2 weeks  ED prompts d/w pt

## 2023-03-15 ENCOUNTER — PATIENT OUTREACH (OUTPATIENT)
Dept: ADMINISTRATIVE | Facility: HOSPITAL | Age: 73
End: 2023-03-15
Payer: MEDICARE

## 2023-03-16 ENCOUNTER — CLINICAL SUPPORT (OUTPATIENT)
Dept: REHABILITATION | Facility: OTHER | Age: 73
End: 2023-03-16
Payer: MEDICARE

## 2023-03-16 DIAGNOSIS — S46.011A TRAUMATIC COMPLETE TEAR OF RIGHT ROTATOR CUFF, INITIAL ENCOUNTER: Primary | ICD-10-CM

## 2023-03-16 PROCEDURE — 97110 THERAPEUTIC EXERCISES: CPT | Mod: HCNC,PN

## 2023-03-16 PROCEDURE — 97140 MANUAL THERAPY 1/> REGIONS: CPT | Mod: HCNC,PN

## 2023-03-16 NOTE — PROGRESS NOTES
[facility]   Physical Therapy Treatment Note     Name: Jessica Floyd  [facility] Number: [id number]    Therapy Diagnosis:   Encounter Diagnoses   Name Primary?    Traumatic complete tear of right rotator cuff, initial encounter      Localized primary osteoarthritis of right shoulder region      Physician: Armani Cai PA-C    Visit Date: 3/16/2023    Physician Orders: Physical Therapy Evaluate and Treat  Medical Diagnosis from Referral:   S46.011A (ICD-10-CM) - Traumatic complete tear of right rotator cuff, initial encounter   M19.011 (ICD-10-CM) - Localized primary osteoarthritis of right shoulder region      Evaluation Date: 2/15/2023                 Authorization Period Expiration: 2/6/2024  Plan of Care Expiration: 2/15/2023 to 4/12/2023  Visit # / Visits authorized: 3/40   FOTO: 1/3  on 2/15/2023  PTA Visit #: 1/5    Precautions: Standard    Time In: 9:15 am  Time Out: 10:00 am  Total Billable Time: 45 minutes      SUBJECTIVE   Pt reports: patient reports that she has more pain than usual, thinks it is from using her cane in her right arm since she uses that for her left knee    She was not compliant with home exercise program.  Response to previous treatment: felt good, decreased pain  Functional change: None today    Pain: 6/10 sitting still   Location: R shoulder     OBJECTIVE     2/15/2023:  Observation: ambulates with SPC in RUE     Posture: forward shoulders, forward head posture     Passive Range of Motion:   Shoulder Right Left   Flexion 85* 160   Abduction 110* 155   ER at 45 35*     ER at 90   75   IR 45* 55      Active Range of Motion:   Shoulder Right Left   Flexion 87* 125   Abduction 72* 80                                              Strength:  Shoulder Right Left   Flexion 3+/5* 4/5   Abduction 3+/5* 4/5   ER 3/5* 4-/5   IR 4-/5* 4/5                              Joint Mobility: hypomobility R GHJ posteriorly/inferiorly     Palpation: TTP at biceps tendon,  "supraspinatus, infraspinatus, UT     CMS Impairment/Limitation/Restriction for FOTO Survey     Therapist reviewed FOTO scores for Jessica Floyd on 2/15/2023.   FOTO documents entered into [employer] - see Media section.     Limitation Score: 60%  Predicted Goal: 33%     Category: Mobility     TREATMENT     Jessica received the bolded treatments listed below:      Patient received therapeutic exercises for 12 minutes for improved strength and AROM including:  Shoulder isometrics flx/INTERNAL ROTATION/ER 20x5"                      Shoulder pendulum circles x20 ea                   Table slides flx/abd x20 ea   Table circles x20     Shoulder pendulum hang 2'    Patient received manual therapeutic technique for 15 minutes for improved soft tissue and joint mobility including:  PROM R shoulder  Grade 1-2 joint glides in all directions for decreased muscle guarding and pain   STM to R shoulder    Patient received neuromuscular reeducation for 00 minutes for improved proprioception and balance including:        Patient received therapeutic activities for 00 minutes for improved tolerance to functional activities including:      Jessica received hot pack for 8 minutes to increase circulation and promote tissue healing.    Jessica received cold pack for 10 minutes to to decrease circulation, pain, and swelling.      PATIENT EDUCATION AND HOME EXERCISES     Home Exercises Provided and Patient Education Provided     Education provided:   PT educated pt on importance of compliance with their HEP this visit.     Written Home Exercises Provided: Patient instructed to cont prior HEP. Exercises were reviewed and Jessica was able to demonstrate them prior to the end of the session.  Jessica demonstrated fair  understanding of the education provided. See EMR under Patient Instructions for exercises provided during therapy sessions    ASSESSMENT   Patient has minimal tolerance for exercises today with focus on shoulder range of motion and light " muscle activation within tolerance. Patient required heat at beginning of session to tolerate range of motion and gentle strengthening exercises. Patient able to perform shoulder isometrics with no increase in pain today however, unable to progress at this time due to decreased tolerance for strengthening exercises.       Jessica Is not progressing well towards her goals.   Pt prognosis is Fair.     Pt will continue to benefit from skilled outpatient physical therapy to address the deficits listed in the problem list box on initial evaluation, provide pt/family education and to maximize pt's level of independence in the home and community environment.     Pt's spiritual, cultural and educational needs considered and pt agreeable to plan of care and goals.     Anticipated barriers to physical therapy: None    Goals:    Short Term Goals:  4 weeks  1.Report decreased R shoulder pain pain < / =  6/10  to increase tolerance for reaching (progressing, not met)  2. Increase PROM to 120 degrees R shoulder flexion (progressing, not met)  3. Increased strength by 1/3 MMT grade in R shoulder abduction to increase tolerance for ADL and work activities. (progressing, not met)  4. Pt to tolerate HEP to improve ROM and independence with ACTIVITIES OF DAILY LIVING's (progressing, not met)     Long Term Goals: 8 weeks  1.Report decreased R shoulder pain  < / =  3 /10  to increase tolerance for lifting (progressing, not met)  2.Increase AROM to 150 deg R shoulder flexion (progressing, not met)  3.Increase strength to >/= 4/5 in R shoulder abduction to increase tolerance for ADL and work activities. (progressing, not met)  4. Pt goal: perform ADLs without shoulder pain. (progressing, not met)  5. Pt will have improved gcode of CJ (20-40% limited) on FOTO shoulder in order to demonstrate true functional improvement. (progressing, not met)    PLAN   Plan of care Certification: 2/15/2023 to 4/12/2023    Improve R shoulder ROM and  strength     Outpatient Physical Therapy 2 times weekly for 8 weeks to include the following interventions: Therapeutic Exercises, Manual Therapeutic Technique, Neuromuscular Re Education, Therapeutic Activities. Modalities, Kinesiotape prn, and Functional Dry Needling as needed.      Pamela Quintanilla, PT, DPT

## 2023-03-17 ENCOUNTER — PATIENT MESSAGE (OUTPATIENT)
Dept: SPORTS MEDICINE | Facility: CLINIC | Age: 73
End: 2023-03-17
Payer: MEDICARE

## 2023-03-21 ENCOUNTER — CLINICAL SUPPORT (OUTPATIENT)
Dept: REHABILITATION | Facility: OTHER | Age: 73
End: 2023-03-21
Payer: MEDICARE

## 2023-03-21 DIAGNOSIS — S46.011A TRAUMATIC COMPLETE TEAR OF RIGHT ROTATOR CUFF, INITIAL ENCOUNTER: Primary | ICD-10-CM

## 2023-03-21 PROCEDURE — 97140 MANUAL THERAPY 1/> REGIONS: CPT | Mod: HCNC,PN

## 2023-03-21 PROCEDURE — 97110 THERAPEUTIC EXERCISES: CPT | Mod: HCNC,PN

## 2023-03-21 PROCEDURE — 97010 HOT OR COLD PACKS THERAPY: CPT | Mod: HCNC,PN

## 2023-03-21 NOTE — PROGRESS NOTES
[facility] OUTPATIENT THERAPY AND WELLNESS   Physical Therapy Progress Note     Name: Jessica Floyd  [facility] Number: [id number]    Therapy Diagnosis:   Encounter Diagnoses   Name Primary?    Traumatic complete tear of right rotator cuff, initial encounter      Localized primary osteoarthritis of right shoulder region      Physician: Armani Cai PA-C    Visit Date: 3/21/2023    Physician Orders: Physical Therapy Evaluate and Treat  Medical Diagnosis from Referral:   S46.011A (ICD-10-CM) - Traumatic complete tear of right rotator cuff, initial encounter   M19.011 (ICD-10-CM) - Localized primary osteoarthritis of right shoulder region      Evaluation Date: 2/15/2023                 Authorization Period Expiration: 2/6/2024  Progress Note Due: 3/21/2023  Plan of Care Expiration: 2/15/2023 to 4/12/2023  Visit # / Visits authorized: 6/40   FOTO: 1/3  on 2/15/2023  PTA Visit #: 1/5    Precautions: Standard    Time In: 9:15 am  Time Out: 10:05 am  Total Billable Time: 50 minutes      SUBJECTIVE   Pt reports: patient reports that her pain is increased today, states that she was very sore after last session and over the whole weekend.  Pt states she continues to perform HEP but it was more painful over the weekend, requests not to do isometrics today.  Pain is still easily irritated by exercise, particularly isometrics.  Pt states that overall she feels the pain has improved but it continues to vary day to day.    She was not compliant with home exercise program.  Response to previous treatment: felt good, decreased pain  Functional change: None today    Pain: 6/10 sitting still, 1/10 at best, 10/10 at worst  Location: R shoulder     OBJECTIVE     3/21/2023:  Observation: ambulates with SPC in RUE     Posture: forward shoulders, forward head posture     Passive Range of Motion:   Shoulder Right Left   Flexion 150* 160   Abduction 110* 155   ER at 45 40*     ER at 90   75   IR 45* 55      Active Range of Motion:   Shoulder  "Right Left   Flexion 127* 125   Abduction 95* 80                                              Strength:  Shoulder Right Left   Flexion 3+/5* 4/5   Abduction 3+/5* 4/5   ER 3/5* 4-/5   IR 4-/5* 4/5                              Joint Mobility: hypomobility R GHJ posteriorly/inferiorly     Palpation: TTP at biceps tendon, supraspinatus, infraspinatus, UT     CMS Impairment/Limitation/Restriction for FOTO Survey     Therapist reviewed FOTO scores for Jessica Floyd on 2/15/2023.   FOTO documents entered into [employer] - see Media section.     Limitation Score: 60%  Predicted Goal: 33%     Category: Mobility     TREATMENT     Jessica received the bolded treatments listed below:      Patient received therapeutic exercises for 15 minutes for improved strength and AROM including:  Shoulder isometrics flx/INTERNAL ROTATION/ER 20x5"                      Shoulder pendulum circles x20 ea                   Table slides flx/abd x20 ea   Pulleys flexion, 3 min  Table circles x20     Shoulder pendulum hang 2'    Patient received manual therapeutic technique for 15 minutes for improved soft tissue and joint mobility including:  PROM R shoulder  Grade 1-2 joint glides in all directions for decreased muscle guarding and pain   STM to R shoulder    Patient received neuromuscular reeducation for 00 minutes for improved proprioception and balance including:        Patient received therapeutic activities for 00 minutes for improved tolerance to functional activities including:      Jessica received hot pack for 10 minutes to increase circulation and promote tissue healing.    Jessica received cold pack for 10 minutes to to decrease circulation, pain, and swelling.      PATIENT EDUCATION AND HOME EXERCISES     Home Exercises Provided and Patient Education Provided     Education provided:   PT educated pt on importance of compliance with their HEP this visit.     Written Home Exercises Provided: Patient instructed to cont prior HEP. Exercises were " reviewed and Jessica was able to demonstrate them prior to the end of the session.  Jessica demonstrated fair  understanding of the education provided. See EMR under Patient Instructions for exercises provided during therapy sessions    ASSESSMENT   Pt expresses overall marginal improvement in shoulder pain/function, she continues to have difficulty with lifting/reaching along with constant pain that is easily aggravated with movement. Pt has had difficulty progressing due to pain after sessions and has needed pain management with heat/ice in order to tolerate sessions.  Patient continues to demonstrate limitations in shoulder ROM/strength and activity tolerance, which contributes to difficulty with reaching, lifting, sleeping, dressing, etc.  Pt has low tolerance to exercise but responds well to PROM after hot pack.  Added shoulder pulleys today and pt is able to perform without aggravation.      Jessica Is not progressing well towards her goals.   Pt prognosis is Fair.     Pt will continue to benefit from skilled outpatient physical therapy to address the deficits listed in the problem list box on initial evaluation, provide pt/family education and to maximize pt's level of independence in the home and community environment.     Pt's spiritual, cultural and educational needs considered and pt agreeable to plan of care and goals.     Anticipated barriers to physical therapy: None    Goals:    Short Term Goals:  4 weeks  1.Report decreased R shoulder pain pain < / =  6/10  to increase tolerance for reaching (progressing, not met)  2. Increase PROM to 120 degrees R shoulder flexion (progressing, not met)  3. Increased strength by 1/3 MMT grade in R shoulder abduction to increase tolerance for ADL and work activities. (progressing, not met)  4. Pt to tolerate HEP to improve ROM and independence with ACTIVITIES OF DAILY LIVING's (progressing, not met)     Long Term Goals: 8 weeks  1.Report decreased R shoulder pain  < / =   3 /10  to increase tolerance for lifting (progressing, not met)  2.Increase AROM to 150 deg R shoulder flexion (progressing, not met)  3.Increase strength to >/= 4/5 in R shoulder abduction to increase tolerance for ADL and work activities. (progressing, not met)  4. Pt goal: perform ADLs without shoulder pain. (progressing, not met)  5. Pt will have improved gcode of CJ (20-40% limited) on FOTO shoulder in order to demonstrate true functional improvement. (progressing, not met)    PLAN   Plan of care Certification: 2/15/2023 to 4/12/2023    Improve R shoulder ROM and strength     Outpatient Physical Therapy 2 times weekly for 8 weeks to include the following interventions: Therapeutic Exercises, Manual Therapeutic Technique, Neuromuscular Re Education, Therapeutic Activities. Modalities, Kinesiotape prn, and Functional Dry Needling as needed.      Vito Kaiser, PT, DPT

## 2023-03-22 NOTE — PROGRESS NOTES
"[facility]   Physical Therapy Progress Note     Name: Jessica Floyd  Clinic Number: [id number]    Therapy Diagnosis:   Encounter Diagnoses   Name Primary?    Traumatic complete tear of right rotator cuff, initial encounter      Localized primary osteoarthritis of right shoulder region      Physician: Armani Cai PA-C    Visit Date: 3/23/2023    Physician Orders: Physical Therapy Evaluate and Treat  Medical Diagnosis from Referral: Traumatic complete tear of right rotator cuff, initial encounter (S46.011A); Localized primary osteoarthritis of right shoulder region (M19.011 )  Evaluation Date: 2/15/2023                 Authorization Period Expiration: 2/6/2024  Progress Note Due: 4/21/2023  Plan of Care Expiration: 2/15/2023 to 4/12/2023  Visit # / Visits authorized: 7/40   FOTO: 3/23/2023 (2/3)  PTA Visit #: 1/5    Precautions: Standard    Time In: 8:45 am (late arrival)   Time Out: 9:25 am   Total Billable Time: 30 minutes      SUBJECTIVE   Pt reports: she is doing okay     She was not compliant with home exercise program.  Response to previous treatment: felt good, decreased pain  Functional change: None today    Pain: 6/10 sitting still, 1/10 at best, 10/10 at worst  Location: R shoulder     OBJECTIVE         CMS Impairment/Limitation/Restriction for FOTO Shoulder Survey    Therapist reviewed FOTO scores for Jessica Floyd on 3/23/2023.   FOTO documents entered into [employer] - see Media section.    Limitation Score: 50%  Category: Other    Current : CK = at least 40% but < 60% impaired, limited or restricted  Goal: CJ = at least 20% but < 40% impaired, limited or restricted          TREATMENT     Jessica received the bolded treatments listed below:      Patient received therapeutic exercises for 15 minutes for improved strength and AROM including:  Shoulder isometrics flx/INTERNAL ROTATION/ER 20x5"                      Shoulder pendulum circles x20 ea                   Table slides flx/abd " x20 ea  With MHP  Pulleys flexion/scaption with MHP, x3 min  Table circles x20   +Ball press into mat with yellow physioball 3 second holds x20      Shoulder pendulum hang 2'    Consider gentle isometric shoulder exercises.     Patient received manual therapeutic technique for 10 minutes for improved soft tissue and joint mobility including:  PROM R shoulder  Grade 1-2 joint glides in all directions for decreased muscle guarding and pain   STM to R shoulder    Patient received neuromuscular reeducation for 00 minutes for improved proprioception and balance including:        Patient received therapeutic activities for 00 minutes for improved tolerance to functional activities including:      Jessica received hot pack for 10 minutes to increase circulation and promote tissue healing with exercises.    Jessica received cold pack for 10 minutes to to decrease circulation, pain, and swelling.      PATIENT EDUCATION AND HOME EXERCISES     Home Exercises Provided and Patient Education Provided     Education provided:   PT educated pt on importance of compliance with their HEP this visit.     Written Home Exercises Provided: Patient instructed to cont prior HEP. Exercises were reviewed and Jessica was able to demonstrate them prior to the end of the session.  Jessica demonstrated fair  understanding of the education provided. See EMR under Patient Instructions for exercises provided during therapy sessions    ASSESSMENT   Trialed heat with activities today due to late arrival. Patient denied increase in symptoms with this today.  Initiated gentle isometric exercise with ball press into table.  Consider additional shoulder isometric exercises as tolerated in future visits. Significant guarding today in the shoulder with limited tolerance for PROM.  Ice provided post treatment.      Jessica Is not progressing well towards her goals.   Pt prognosis is Fair.     Pt will continue to benefit from skilled outpatient physical therapy to  address the deficits listed in the problem list box on initial evaluation, provide pt/family education and to maximize pt's level of independence in the home and community environment.     Pt's spiritual, cultural and educational needs considered and pt agreeable to plan of care and goals.     Anticipated barriers to physical therapy: None    Goals:    Short Term Goals:  4 weeks  1.Report decreased R shoulder pain pain < / =  6/10  to increase tolerance for reaching (progressing, not met)  2. Increase PROM to 120 degrees R shoulder flexion (progressing, not met)  3. Increased strength by 1/3 MMT grade in R shoulder abduction to increase tolerance for ADL and work activities. (progressing, not met)  4. Pt to tolerate HEP to improve ROM and independence with ACTIVITIES OF DAILY LIVING's (progressing, not met)     Long Term Goals: 8 weeks  1.Report decreased R shoulder pain  < / =  3 /10  to increase tolerance for lifting (progressing, not met)  2.Increase AROM to 150 deg R shoulder flexion (progressing, not met)  3.Increase strength to >/= 4/5 in R shoulder abduction to increase tolerance for ADL and work activities. (progressing, not met)  4. Pt goal: perform ADLs without shoulder pain. (progressing, not met)  5. Pt will have improved gcode of CJ (20-40% limited) on FOTO shoulder in order to demonstrate true functional improvement. (progressing, not met)    PLAN   Plan of care Certification: 2/15/2023 to 4/12/2023    Improve R shoulder ROM and strength     Outpatient Physical Therapy 2 times weekly for 8 weeks to include the following interventions: Therapeutic Exercises, Manual Therapeutic Technique, Neuromuscular Re Education, Therapeutic Activities. Modalities, Kinesiotape prn, and Functional Dry Needling as needed.      Leila Hairston, PT

## 2023-03-23 ENCOUNTER — CLINICAL SUPPORT (OUTPATIENT)
Dept: REHABILITATION | Facility: OTHER | Age: 73
End: 2023-03-23
Payer: MEDICARE

## 2023-03-23 DIAGNOSIS — S46.011A TRAUMATIC COMPLETE TEAR OF RIGHT ROTATOR CUFF, INITIAL ENCOUNTER: Primary | ICD-10-CM

## 2023-03-23 DIAGNOSIS — M19.011 LOCALIZED PRIMARY OSTEOARTHRITIS OF RIGHT SHOULDER REGION: ICD-10-CM

## 2023-03-23 PROCEDURE — 97110 THERAPEUTIC EXERCISES: CPT | Mod: HCNC,PN

## 2023-03-23 PROCEDURE — 97140 MANUAL THERAPY 1/> REGIONS: CPT | Mod: HCNC,PN

## 2023-03-28 ENCOUNTER — CLINICAL SUPPORT (OUTPATIENT)
Dept: REHABILITATION | Facility: OTHER | Age: 73
End: 2023-03-28
Payer: MEDICARE

## 2023-03-28 ENCOUNTER — LAB VISIT (OUTPATIENT)
Dept: LAB | Facility: HOSPITAL | Age: 73
End: 2023-03-28
Attending: PODIATRIST
Payer: MEDICARE

## 2023-03-28 ENCOUNTER — OFFICE VISIT (OUTPATIENT)
Dept: PRIMARY CARE CLINIC | Facility: CLINIC | Age: 73
End: 2023-03-28
Attending: FAMILY MEDICINE
Payer: MEDICARE

## 2023-03-28 VITALS
WEIGHT: 129.5 LBS | HEIGHT: 63 IN | BODY MASS INDEX: 22.95 KG/M2 | DIASTOLIC BLOOD PRESSURE: 80 MMHG | SYSTOLIC BLOOD PRESSURE: 130 MMHG

## 2023-03-28 DIAGNOSIS — I65.23 BILATERAL CAROTID ARTERY STENOSIS: ICD-10-CM

## 2023-03-28 DIAGNOSIS — I10 HYPERTENSION, UNSPECIFIED TYPE: ICD-10-CM

## 2023-03-28 DIAGNOSIS — M79.671 FOOT PAIN, RIGHT: ICD-10-CM

## 2023-03-28 DIAGNOSIS — M10.9 ACUTE GOUT OF RIGHT FOOT, UNSPECIFIED CAUSE: ICD-10-CM

## 2023-03-28 DIAGNOSIS — D50.8 OTHER IRON DEFICIENCY ANEMIA: ICD-10-CM

## 2023-03-28 DIAGNOSIS — S46.011A TRAUMATIC COMPLETE TEAR OF RIGHT ROTATOR CUFF, INITIAL ENCOUNTER: ICD-10-CM

## 2023-03-28 DIAGNOSIS — M19.011 LOCALIZED PRIMARY OSTEOARTHRITIS OF RIGHT SHOULDER REGION: Primary | ICD-10-CM

## 2023-03-28 DIAGNOSIS — D50.8 OTHER IRON DEFICIENCY ANEMIA: Primary | ICD-10-CM

## 2023-03-28 DIAGNOSIS — I60.9 SAH (SUBARACHNOID HEMORRHAGE): ICD-10-CM

## 2023-03-28 PROBLEM — J06.9 VIRAL UPPER RESPIRATORY TRACT INFECTION: Status: RESOLVED | Noted: 2018-10-15 | Resolved: 2023-03-28

## 2023-03-28 LAB
BASOPHILS # BLD AUTO: 0.07 K/UL (ref 0–0.2)
BASOPHILS NFR BLD: 0.9 % (ref 0–1.9)
DIFFERENTIAL METHOD: ABNORMAL
EOSINOPHIL # BLD AUTO: 0.2 K/UL (ref 0–0.5)
EOSINOPHIL NFR BLD: 2.8 % (ref 0–8)
ERYTHROCYTE [DISTWIDTH] IN BLOOD BY AUTOMATED COUNT: 17.3 % (ref 11.5–14.5)
HCT VFR BLD AUTO: 28.8 % (ref 37–48.5)
HGB BLD-MCNC: 8.9 G/DL (ref 12–16)
IMM GRANULOCYTES # BLD AUTO: 0.04 K/UL (ref 0–0.04)
IMM GRANULOCYTES NFR BLD AUTO: 0.5 % (ref 0–0.5)
LYMPHOCYTES # BLD AUTO: 1.8 K/UL (ref 1–4.8)
LYMPHOCYTES NFR BLD: 23.9 % (ref 18–48)
MCH RBC QN AUTO: 27.6 PG (ref 27–31)
MCHC RBC AUTO-ENTMCNC: 30.9 G/DL (ref 32–36)
MCV RBC AUTO: 89 FL (ref 82–98)
MONOCYTES # BLD AUTO: 1.3 K/UL (ref 0.3–1)
MONOCYTES NFR BLD: 16.9 % (ref 4–15)
NEUTROPHILS # BLD AUTO: 4.1 K/UL (ref 1.8–7.7)
NEUTROPHILS NFR BLD: 55 % (ref 38–73)
NRBC BLD-RTO: 0 /100 WBC
PLATELET # BLD AUTO: 370 K/UL (ref 150–450)
PMV BLD AUTO: 11.2 FL (ref 9.2–12.9)
RBC # BLD AUTO: 3.22 M/UL (ref 4–5.4)
URATE SERPL-MCNC: 6.6 MG/DL (ref 2.4–5.7)
WBC # BLD AUTO: 7.46 K/UL (ref 3.9–12.7)

## 2023-03-28 PROCEDURE — 97010 HOT OR COLD PACKS THERAPY: CPT | Mod: HCNC,PN

## 2023-03-28 PROCEDURE — 3075F PR MOST RECENT SYSTOLIC BLOOD PRESS GE 130-139MM HG: ICD-10-PCS | Mod: HCNC,CPTII,S$GLB, | Performed by: FAMILY MEDICINE

## 2023-03-28 PROCEDURE — 1160F RVW MEDS BY RX/DR IN RCRD: CPT | Mod: HCNC,CPTII,S$GLB, | Performed by: FAMILY MEDICINE

## 2023-03-28 PROCEDURE — 84550 ASSAY OF BLOOD/URIC ACID: CPT | Mod: HCNC | Performed by: PODIATRIST

## 2023-03-28 PROCEDURE — 3008F PR BODY MASS INDEX (BMI) DOCUMENTED: ICD-10-PCS | Mod: HCNC,CPTII,S$GLB, | Performed by: FAMILY MEDICINE

## 2023-03-28 PROCEDURE — 1101F PR PT FALLS ASSESS DOC 0-1 FALLS W/OUT INJ PAST YR: ICD-10-PCS | Mod: HCNC,CPTII,S$GLB, | Performed by: FAMILY MEDICINE

## 2023-03-28 PROCEDURE — 3079F PR MOST RECENT DIASTOLIC BLOOD PRESSURE 80-89 MM HG: ICD-10-PCS | Mod: HCNC,CPTII,S$GLB, | Performed by: FAMILY MEDICINE

## 2023-03-28 PROCEDURE — 99215 OFFICE O/P EST HI 40 MIN: CPT | Mod: HCNC,S$GLB,, | Performed by: FAMILY MEDICINE

## 2023-03-28 PROCEDURE — 1160F PR REVIEW ALL MEDS BY PRESCRIBER/CLIN PHARMACIST DOCUMENTED: ICD-10-PCS | Mod: HCNC,CPTII,S$GLB, | Performed by: FAMILY MEDICINE

## 2023-03-28 PROCEDURE — 3075F SYST BP GE 130 - 139MM HG: CPT | Mod: HCNC,CPTII,S$GLB, | Performed by: FAMILY MEDICINE

## 2023-03-28 PROCEDURE — 1125F AMNT PAIN NOTED PAIN PRSNT: CPT | Mod: HCNC,CPTII,S$GLB, | Performed by: FAMILY MEDICINE

## 2023-03-28 PROCEDURE — 1101F PT FALLS ASSESS-DOCD LE1/YR: CPT | Mod: HCNC,CPTII,S$GLB, | Performed by: FAMILY MEDICINE

## 2023-03-28 PROCEDURE — 1159F PR MEDICATION LIST DOCUMENTED IN MEDICAL RECORD: ICD-10-PCS | Mod: HCNC,CPTII,S$GLB, | Performed by: FAMILY MEDICINE

## 2023-03-28 PROCEDURE — 99999 PR PBB SHADOW E&M-EST. PATIENT-LVL IV: CPT | Mod: PBBFAC,HCNC,, | Performed by: FAMILY MEDICINE

## 2023-03-28 PROCEDURE — 3079F DIAST BP 80-89 MM HG: CPT | Mod: HCNC,CPTII,S$GLB, | Performed by: FAMILY MEDICINE

## 2023-03-28 PROCEDURE — 3288F FALL RISK ASSESSMENT DOCD: CPT | Mod: HCNC,CPTII,S$GLB, | Performed by: FAMILY MEDICINE

## 2023-03-28 PROCEDURE — 36415 COLL VENOUS BLD VENIPUNCTURE: CPT | Mod: HCNC,PN | Performed by: PODIATRIST

## 2023-03-28 PROCEDURE — 99999 PR PBB SHADOW E&M-EST. PATIENT-LVL IV: ICD-10-PCS | Mod: PBBFAC,HCNC,, | Performed by: FAMILY MEDICINE

## 2023-03-28 PROCEDURE — 97140 MANUAL THERAPY 1/> REGIONS: CPT | Mod: HCNC,PN

## 2023-03-28 PROCEDURE — 99215 PR OFFICE/OUTPT VISIT, EST, LEVL V, 40-54 MIN: ICD-10-PCS | Mod: HCNC,S$GLB,, | Performed by: FAMILY MEDICINE

## 2023-03-28 PROCEDURE — 3008F BODY MASS INDEX DOCD: CPT | Mod: HCNC,CPTII,S$GLB, | Performed by: FAMILY MEDICINE

## 2023-03-28 PROCEDURE — 85025 COMPLETE CBC W/AUTO DIFF WBC: CPT | Mod: HCNC | Performed by: FAMILY MEDICINE

## 2023-03-28 PROCEDURE — 1125F PR PAIN SEVERITY QUANTIFIED, PAIN PRESENT: ICD-10-PCS | Mod: HCNC,CPTII,S$GLB, | Performed by: FAMILY MEDICINE

## 2023-03-28 PROCEDURE — 3288F PR FALLS RISK ASSESSMENT DOCUMENTED: ICD-10-PCS | Mod: HCNC,CPTII,S$GLB, | Performed by: FAMILY MEDICINE

## 2023-03-28 PROCEDURE — 1159F MED LIST DOCD IN RCRD: CPT | Mod: HCNC,CPTII,S$GLB, | Performed by: FAMILY MEDICINE

## 2023-03-28 NOTE — PROGRESS NOTES
[facility]   Physical Therapy Progress Note     Name: Jessica Floyd  Clinic Number: [id number]    Therapy Diagnosis:   Encounter Diagnoses   Name Primary?    Traumatic complete tear of right rotator cuff, initial encounter      Localized primary osteoarthritis of right shoulder region      Physician: Armani Cai PA-C    Visit Date: 3/28/2023    Physician Orders: Physical Therapy Evaluate and Treat  Medical Diagnosis from Referral: Traumatic complete tear of right rotator cuff, initial encounter (S46.011A); Localized primary osteoarthritis of right shoulder region (M19.011 )  Evaluation Date: 2/15/2023                 Authorization Period Expiration: 2/6/2024  Progress Note Due: 4/21/2023  Plan of Care Expiration: 2/15/2023 to 4/12/2023  Visit # / Visits authorized: 9/40   FOTO: 3/23/2023 (2/3)  PTA Visit #: 1/5    Precautions: Standard    Time In: 8:50 am (late arrival)   Time Out: 9:25 am   Total Billable Time: 30 minutes      SUBJECTIVE   Pt reports: she is having more pain in the shoulder these last couple of days, although the pain had been feeling a bit better up until this point.  Pt states that she has been doing some cleaning and gardening over the last few days and feels that this activity has irritated her shoulder.  Pt was able to get most of this work done before it became painful, but then shoulder was painful and difficult to move afterwards.    She was not compliant with home exercise program.  Response to previous treatment: felt good, decreased pain  Functional change: None today    Pain: 4/10 sitting still, 1/10 at best, 10/10 at worst  Location: R shoulder     OBJECTIVE         CMS Impairment/Limitation/Restriction for FOTO Shoulder Survey    Therapist reviewed FOTO scores for Jessica Floyd on 3/28/2023.   FOTO documents entered into [employer] - see Media section.    Limitation Score: 50%  Category: Other    Current : CK = at least 40% but < 60% impaired, limited or  "restricted  Goal: CJ = at least 20% but < 40% impaired, limited or restricted          TREATMENT     Jessica received the bolded treatments listed below:      Patient received therapeutic exercises for 5 minutes for improved strength and AROM including:  Shoulder isometrics flx/INTERNAL ROTATION/ER 20x5"                      Shoulder pendulum circles x20 ea                   Table slides flx/abd x20 ea  With MHP  Pulleys flexion/scaption with MHP, x3 min  Table circles x20   Ball press into mat with yellow physioball 3 second holds x20  Ball press IR iso with yellow PB 3" hold x20      Shoulder pendulum hang 2'    Consider gentle isometric shoulder exercises.     Patient received manual therapeutic technique for  15 minutes for improved soft tissue and joint mobility including:  PROM R shoulder  Grade 1-2 joint glides in all directions for decreased muscle guarding and pain   STM to R shoulder    Patient received neuromuscular reeducation for 00 minutes for improved proprioception and balance including:        Patient received therapeutic activities for 00 minutes for improved tolerance to functional activities including:      Jessica received hot pack for 5 minutes to increase circulation and promote tissue healing prior to exercises.    Jessica received cold pack for 10 minutes to to decrease circulation, pain, and swelling after exercises.      PATIENT EDUCATION AND HOME EXERCISES     Home Exercises Provided and Patient Education Provided     Education provided:   PT educated pt on importance of compliance with their HEP this visit.     Written Home Exercises Provided: Patient instructed to cont prior HEP. Exercises were reviewed and Jessica was able to demonstrate them prior to the end of the session.  Jessica demonstrated fair  understanding of the education provided. See EMR under Patient Instructions for exercises provided during therapy sessions    ASSESSMENT   Pt continues to demonstrate limitations in shoulder " ROM/strength and posture, which contributes to difficulty with ADLs such as reaching, lifting, gardening, cooking, etc.  Pt arrived late to appointment and so multiple exercises were withheld.  Pt continues to respond favorably to heat/cold.  Pt demonstrates improved PROM and improved tolerance to isometric shoulder strengthening.    Jessica Is not progressing well towards her goals.   Pt prognosis is Fair.     Pt will continue to benefit from skilled outpatient physical therapy to address the deficits listed in the problem list box on initial evaluation, provide pt/family education and to maximize pt's level of independence in the home and community environment.     Pt's spiritual, cultural and educational needs considered and pt agreeable to plan of care and goals.     Anticipated barriers to physical therapy: None    Goals:    Short Term Goals:  4 weeks  1.Report decreased R shoulder pain pain < / =  6/10  to increase tolerance for reaching (progressing, not met)  2. Increase PROM to 120 degrees R shoulder flexion (progressing, not met)  3. Increased strength by 1/3 MMT grade in R shoulder abduction to increase tolerance for ADL and work activities. (progressing, not met)  4. Pt to tolerate HEP to improve ROM and independence with ACTIVITIES OF DAILY LIVING's (progressing, not met)     Long Term Goals: 8 weeks  1.Report decreased R shoulder pain  < / =  3 /10  to increase tolerance for lifting (progressing, not met)  2.Increase AROM to 150 deg R shoulder flexion (progressing, not met)  3.Increase strength to >/= 4/5 in R shoulder abduction to increase tolerance for ADL and work activities. (progressing, not met)  4. Pt goal: perform ADLs without shoulder pain. (progressing, not met)  5. Pt will have improved gcode of CJ (20-40% limited) on FOTO shoulder in order to demonstrate true functional improvement. (progressing, not met)    PLAN   Plan of care Certification: 2/15/2023 to 4/12/2023    Improve R shoulder ROM  and strength     Outpatient Physical Therapy 2 times weekly for 8 weeks to include the following interventions: Therapeutic Exercises, Manual Therapeutic Technique, Neuromuscular Re Education, Therapeutic Activities. Modalities, Kinesiotape prn, and Functional Dry Needling as needed.      Vito Kaiser, PT

## 2023-03-28 NOTE — PROGRESS NOTES
Subjective:       Patient ID: Jessica Floyd is a 72 y.o. female.    Chief Complaint: Follow-up (Still no energy)    Pt presents today for f./u from 2 ED visits and an office visit with me. Today she is stable and is taking iron supplementation. But still feels very tired and not herself.     The patient was referred in ER follow-up for evaluation of epistaxis.  Patient had 3 ER visits at [facility], [place], operated by [facility] on 02/17 and 02/18/2023 with return on the same day on the 2nd visit.  She had nosebleed that started at initial visit with no prior history of nosebleeds.  She was on prophylactic daily aspirin that she stop as soon as the 1st bleed.  She was putting tissues in her nose on the 1st bleed.  She was given Afrin soaked gauze on the 1st ER visit and then told to put lidocaine with epinephrine on gauze on the 2nd ER visit with return and then she presented to [facility] ER desiring to see ENT on 02/19/2023 where she had a rhino rocket placed.  Patient had since had follow-up with urgent care ENT and shell met where rhino rocket was removed at 48 hours and septum was cauterized on the right.  Also went to ENT and was told that she needs scans and scopes. She returns today for follow up with me.  She has not had no nosebleeds since the cauterization.  She denies any rhinorrhea or thick mucus or fevers.    Pt does think that derm skin freeze might have triggered her nose bleed since it was in that side of her face near her nose      Follow-up  Associated symptoms include fatigue.   Hypertension    Anemia    Fatigue  Associated symptoms include fatigue.   Review of Systems   Constitutional:  Positive for activity change and fatigue.   All other systems reviewed and are negative.      Objective:      Physical Exam  Vitals reviewed.   Constitutional:       General: She is not in acute distress.     Appearance: Normal appearance. She is well-developed and normal weight. She is not ill-appearing, toxic-appearing or diaphoretic.    HENT:      Head: Normocephalic and atraumatic.      Nose: Nose normal.   Eyes:      Extraocular Movements: Extraocular movements intact.      Conjunctiva/sclera: Conjunctivae normal.      Pupils: Pupils are equal, round, and reactive to light.   Neck:      Thyroid: No thyromegaly.   Cardiovascular:      Rate and Rhythm: Normal rate and regular rhythm.      Heart sounds: Normal heart sounds. No murmur heard.  Pulmonary:      Effort: Pulmonary effort is normal. No respiratory distress.      Breath sounds: Normal breath sounds. No wheezing or rales.   Chest:      Chest wall: No tenderness.   Musculoskeletal:         General: Normal range of motion.      Cervical back: Normal range of motion and neck supple.      Right lower leg: No edema.      Left lower leg: No edema.   Lymphadenopathy:      Cervical: No cervical adenopathy.   Skin:     General: Skin is warm.      Coloration: Skin is not jaundiced or pale.      Findings: No bruising, erythema, lesion or rash.   Neurological:      Mental Status: She is alert and oriented to person, place, and time.      Motor: Weakness present.   Psychiatric:         Behavior: Behavior normal.         Thought Content: Thought content normal.         Judgment: Judgment normal.       Assessment:       Problem List Items Addressed This Visit       Bilateral carotid artery stenosis    Hypertension    SAH (subarachnoid hemorrhage)     Other Visit Diagnoses       Other iron deficiency anemia    -  Primary    Relevant Orders    CBC Auto Differential    Ambulatory referral/consult to Hematology / Oncology              Plan:       Iron studies confirm iron def anemia. Will suggest that we send to [facility] for assessment as well and possible iron infusions.   Recommend rest, hydration, iron rich foods and continue with tabs.   CBC today to assess  Denies GYN or GI bleed    HTN stable on repeat    RTC prn symptoms or after heme  ED prompts d/w pt

## 2023-03-30 ENCOUNTER — CLINICAL SUPPORT (OUTPATIENT)
Dept: REHABILITATION | Facility: OTHER | Age: 73
End: 2023-03-30
Payer: MEDICARE

## 2023-03-30 DIAGNOSIS — S46.011A TRAUMATIC COMPLETE TEAR OF RIGHT ROTATOR CUFF, INITIAL ENCOUNTER: ICD-10-CM

## 2023-03-30 DIAGNOSIS — M19.011 LOCALIZED PRIMARY OSTEOARTHRITIS OF RIGHT SHOULDER REGION: Primary | ICD-10-CM

## 2023-03-30 PROCEDURE — 97010 HOT OR COLD PACKS THERAPY: CPT | Mod: HCNC,PN

## 2023-03-30 PROCEDURE — 97140 MANUAL THERAPY 1/> REGIONS: CPT | Mod: HCNC,PN

## 2023-03-30 PROCEDURE — 97110 THERAPEUTIC EXERCISES: CPT | Mod: HCNC,PN

## 2023-03-30 NOTE — PROGRESS NOTES
[facility]   Physical Therapy Progress Note     Name: Jessica Floyd  Clinic Number: [id number]    Therapy Diagnosis:   Encounter Diagnoses   Name Primary?    Traumatic complete tear of right rotator cuff, initial encounter      Localized primary osteoarthritis of right shoulder region      Physician: Armani Cai PA-C    Visit Date: 3/30/2023    Physician Orders: Physical Therapy Evaluate and Treat  Medical Diagnosis from Referral: Traumatic complete tear of right rotator cuff, initial encounter (S46.011A); Localized primary osteoarthritis of right shoulder region (M19.011 )  Evaluation Date: 2/15/2023                 Authorization Period Expiration: 2/6/2024  Progress Note Due: 4/21/2023  Plan of Care Expiration: 2/15/2023 to 4/12/2023  Visit # / Visits authorized: 10/40   FOTO: 3/23/2023 (2/3)  PTA Visit #: 1/5    Precautions: Standard    Time In: 8:45 am  Time Out: 9:30 am   Total Billable Time: 45 minutes      SUBJECTIVE   Pt reports: she is feeling well today, states that the shoulder continues to gradually improve.  Pt states that she is performing her HEP daily and feels that it is helping significantly with her strength and ROM. Pt states that she has been able to tolerate shoulder isometrics much better.    She was not compliant with home exercise program.  Response to previous treatment: felt good, decreased pain  Functional change: None today    Pain: 4/10 sitting still, 1/10 at best, 10/10 at worst  Location: R shoulder     OBJECTIVE         CMS Impairment/Limitation/Restriction for FOTO Shoulder Survey    Therapist reviewed FOTO scores for Jessica Floyd on 3/30/2023.   FOTO documents entered into [employer] - see Media section.    Limitation Score: 50%  Category: Other    Current : CK = at least 40% but < 60% impaired, limited or restricted  Goal: CJ = at least 20% but < 40% impaired, limited or restricted          TREATMENT     Jessica received the bolded treatments listed below:  "     Patient received therapeutic exercises for 15 minutes for improved strength and AROM including:  Shoulder isometrics flx/INTERNAL ROTATION/ER 20x5"                      Shoulder pendulum circles x20 ea                   Table slides flx/abd x20 ea  With MHP  Pulleys flexion/scaption with MHP, x3 min  Table circles x20   Wall slides with PT assist at beginning of range x20  Ball press into mat with yellow physioball 3 second holds x20  Ball press IR/ER iso with pilates ring 3" hold x20      Shoulder pendulum hang 2'    Consider gentle isometric shoulder exercises.     Patient received manual therapeutic technique for 15 minutes for improved soft tissue and joint mobility including:  PROM R shoulder  Grade 1-2 joint glides in all directions for decreased muscle guarding and pain   STM to R shoulder    Patient received neuromuscular reeducation for 00 minutes for improved proprioception and balance including:        Patient received therapeutic activities for 00 minutes for improved tolerance to functional activities including:      Jessica received hot pack for 5 minutes to increase circulation and promote tissue healing prior to exercises.    Jessica received cold pack for 10 minutes to to decrease circulation, pain, and swelling after exercises.      PATIENT EDUCATION AND HOME EXERCISES     Home Exercises Provided and Patient Education Provided     Education provided:   PT educated pt on importance of compliance with their HEP this visit.     Written Home Exercises Provided: Patient instructed to cont prior HEP. Exercises were reviewed and Jessica was able to demonstrate them prior to the end of the session.  Jessica demonstrated fair  understanding of the education provided. See EMR under Patient Instructions for exercises provided during therapy sessions    ASSESSMENT   Pt continues to demonstrate limitations in shoulder ROM/strength and posture, which contributes to difficulty with ADLs such as reaching, " lifting, gardening, cooking, etc.  Pt arrived late to appointment and so multiple exercises were withheld.  Pt demonstrates improved PROM and improved tolerance to isometric shoulder strengthening.    Jessica Is not progressing well towards her goals.   Pt prognosis is Fair.     Pt will continue to benefit from skilled outpatient physical therapy to address the deficits listed in the problem list box on initial evaluation, provide pt/family education and to maximize pt's level of independence in the home and community environment.     Pt's spiritual, cultural and educational needs considered and pt agreeable to plan of care and goals.     Anticipated barriers to physical therapy: None    Goals:    Short Term Goals:  4 weeks  1.Report decreased R shoulder pain pain < / =  6/10  to increase tolerance for reaching (progressing, not met)  2. Increase PROM to 120 degrees R shoulder flexion (progressing, not met)  3. Increased strength by 1/3 MMT grade in R shoulder abduction to increase tolerance for ADL and work activities. (progressing, not met)  4. Pt to tolerate HEP to improve ROM and independence with ACTIVITIES OF DAILY LIVING's (progressing, not met)     Long Term Goals: 8 weeks  1.Report decreased R shoulder pain  < / =  3 /10  to increase tolerance for lifting (progressing, not met)  2.Increase AROM to 150 deg R shoulder flexion (progressing, not met)  3.Increase strength to >/= 4/5 in R shoulder abduction to increase tolerance for ADL and work activities. (progressing, not met)  4. Pt goal: perform ADLs without shoulder pain. (progressing, not met)  5. Pt will have improved gcode of CJ (20-40% limited) on FOTO shoulder in order to demonstrate true functional improvement. (progressing, not met)    PLAN   Plan of care Certification: 2/15/2023 to 4/12/2023    Improve R shoulder ROM and strength     Outpatient Physical Therapy 2 times weekly for 8 weeks to include the following interventions: Therapeutic Exercises,  Manual Therapeutic Technique, Neuromuscular Re Education, Therapeutic Activities. Modalities, Kinesiotape prn, and Functional Dry Needling as needed.      Vito Kaiser, PT

## 2023-04-04 ENCOUNTER — CLINICAL SUPPORT (OUTPATIENT)
Dept: REHABILITATION | Facility: OTHER | Age: 73
End: 2023-04-04
Payer: MEDICARE

## 2023-04-04 DIAGNOSIS — M19.011 LOCALIZED PRIMARY OSTEOARTHRITIS OF RIGHT SHOULDER REGION: Primary | ICD-10-CM

## 2023-04-04 DIAGNOSIS — S46.011A TRAUMATIC COMPLETE TEAR OF RIGHT ROTATOR CUFF, INITIAL ENCOUNTER: ICD-10-CM

## 2023-04-04 PROCEDURE — 97010 HOT OR COLD PACKS THERAPY: CPT | Mod: HCNC,PN

## 2023-04-04 PROCEDURE — 97110 THERAPEUTIC EXERCISES: CPT | Mod: HCNC,PN

## 2023-04-04 PROCEDURE — 97140 MANUAL THERAPY 1/> REGIONS: CPT | Mod: HCNC,PN

## 2023-04-04 NOTE — PROGRESS NOTES
[facility]   Physical Therapy Daily Treatment Note     Name: Jessica Floyd  Clinic Number: [id number]    Therapy Diagnosis:   Encounter Diagnoses   Name Primary?    Traumatic complete tear of right rotator cuff, initial encounter      Localized primary osteoarthritis of right shoulder region      Physician: Armani Cai PA-C    Visit Date: 4/4/2023    Physician Orders: Physical Therapy Evaluate and Treat  Medical Diagnosis from Referral: Traumatic complete tear of right rotator cuff, initial encounter (S46.011A); Localized primary osteoarthritis of right shoulder region (M19.011 )  Evaluation Date: 2/15/2023                 Authorization Period Expiration: 2/6/2024  Progress Note Due: 4/21/2023  Plan of Care Expiration: 2/15/2023 to 4/12/2023  Visit # / Visits authorized: 11/40   FOTO: 3/23/2023 (2/3)  PTA Visit #: 1/5    Precautions: Standard    Time In: 8:30 am  Time Out: 9:25 am   Total Billable Time: 45 minutes      SUBJECTIVE   Pt reports: the shoulder ROM and function continues to gradually improve, she is not feeling quite as much pain with her ADLs.  Pt feels she is progressing with her pulleys at home.    She was not compliant with home exercise program.  Response to previous treatment: felt good, decreased pain  Functional change: None today    Pain: 4/10 sitting still, 1/10 at best, 10/10 at worst  Location: R shoulder     OBJECTIVE         CMS Impairment/Limitation/Restriction for FOTO Shoulder Survey    Therapist reviewed FOTO scores for Jessica Floyd on 4/4/2023.   FOTO documents entered into [employer] - see Media section.    Limitation Score: 50%  Category: Other    Current : CK = at least 40% but < 60% impaired, limited or restricted  Goal: CJ = at least 20% but < 40% impaired, limited or restricted          TREATMENT     Jessica received the bolded treatments listed below:      Patient received therapeutic exercises for 15 minutes for improved strength and AROM  "including:  Shoulder isometrics flx/INTERNAL ROTATION/ER 20x5"                      Shoulder pendulum circles x20 ea                   Table slides flx/abd x20 ea  With MHP  Pulleys flexion/scaption with MHP, x3 min  Table circles x20   Wall slides x20  Ball press into mat with yellow physioball 3 second holds x20  Ball press IR/ER iso with pilates ring 3" hold x20      Shoulder pendulum hang 2'    Consider gentle isometric shoulder exercises.     Patient received manual therapeutic technique for 20 minutes for improved soft tissue and joint mobility including:  PROM R shoulder  Grade 1-2 joint glides in all directions for decreased muscle guarding and pain   STM to R shoulder    Patient received neuromuscular reeducation for 00 minutes for improved proprioception and balance including:        Patient received therapeutic activities for 00 minutes for improved tolerance to functional activities including:      Jessica received hot pack for 10 minutes to increase circulation and promote tissue healing prior to exercises.    Jessica received cold pack for 10 minutes to to decrease circulation, pain, and swelling after exercises.      PATIENT EDUCATION AND HOME EXERCISES     Home Exercises Provided and Patient Education Provided     Education provided:   PT educated pt on importance of compliance with their HEP this visit.     Written Home Exercises Provided: Patient instructed to cont prior HEP. Exercises were reviewed and Jessica was able to demonstrate them prior to the end of the session.  Jessica demonstrated fair  understanding of the education provided. See EMR under Patient Instructions for exercises provided during therapy sessions    ASSESSMENT   Pt continues to demonstrate limitations in shoulder ROM/strength and posture, which contributes to difficulty with ADLs such as reaching, lifting, gardening, cooking, etc.  Pt expresses pain today with pulleys but otherwise continues to show objective progress with " ROM/strength.    Jessica Is not progressing well towards her goals.   Pt prognosis is Fair.     Pt will continue to benefit from skilled outpatient physical therapy to address the deficits listed in the problem list box on initial evaluation, provide pt/family education and to maximize pt's level of independence in the home and community environment.     Pt's spiritual, cultural and educational needs considered and pt agreeable to plan of care and goals.     Anticipated barriers to physical therapy: None    Goals:    Short Term Goals:  4 weeks  1.Report decreased R shoulder pain pain < / =  6/10  to increase tolerance for reaching (progressing, not met)  2. Increase PROM to 120 degrees R shoulder flexion (progressing, not met)  3. Increased strength by 1/3 MMT grade in R shoulder abduction to increase tolerance for ADL and work activities. (progressing, not met)  4. Pt to tolerate HEP to improve ROM and independence with ACTIVITIES OF DAILY LIVING's (progressing, not met)     Long Term Goals: 8 weeks  1.Report decreased R shoulder pain  < / =  3 /10  to increase tolerance for lifting (progressing, not met)  2.Increase AROM to 150 deg R shoulder flexion (progressing, not met)  3.Increase strength to >/= 4/5 in R shoulder abduction to increase tolerance for ADL and work activities. (progressing, not met)  4. Pt goal: perform ADLs without shoulder pain. (progressing, not met)  5. Pt will have improved gcode of CJ (20-40% limited) on FOTO shoulder in order to demonstrate true functional improvement. (progressing, not met)    PLAN   Plan of care Certification: 2/15/2023 to 4/12/2023    Improve R shoulder ROM and strength     Outpatient Physical Therapy 2 times weekly for 8 weeks to include the following interventions: Therapeutic Exercises, Manual Therapeutic Technique, Neuromuscular Re Education, Therapeutic Activities. Modalities, Kinesiotape prn, and Functional Dry Needling as needed.      Vito Kaiser, PT

## 2023-04-06 ENCOUNTER — CLINICAL SUPPORT (OUTPATIENT)
Dept: REHABILITATION | Facility: OTHER | Age: 73
End: 2023-04-06
Payer: MEDICARE

## 2023-04-06 DIAGNOSIS — S46.011A TRAUMATIC COMPLETE TEAR OF RIGHT ROTATOR CUFF, INITIAL ENCOUNTER: ICD-10-CM

## 2023-04-06 DIAGNOSIS — M19.011 LOCALIZED PRIMARY OSTEOARTHRITIS OF RIGHT SHOULDER REGION: Primary | ICD-10-CM

## 2023-04-06 PROCEDURE — 97110 THERAPEUTIC EXERCISES: CPT | Mod: HCNC,PN

## 2023-04-06 PROCEDURE — 97164 PT RE-EVAL EST PLAN CARE: CPT | Mod: HCNC,PN

## 2023-04-06 NOTE — PROGRESS NOTES
"[facility]   Physical Therapy Daily Treatment Note     Name: Jessica Floyd  Clinic Number: [id number]    Therapy Diagnosis:   Encounter Diagnoses   Name Primary?    Traumatic complete tear of right rotator cuff, initial encounter      Localized primary osteoarthritis of right shoulder region      Physician: Armani Cai PA-C    Visit Date: 4/6/2023    Physician Orders: Physical Therapy Evaluate and Treat  Medical Diagnosis from Referral: Traumatic complete tear of right rotator cuff, initial encounter (S46.011A); Localized primary osteoarthritis of right shoulder region (M19.011 )  Evaluation Date: 2/15/2023                 Authorization Period Expiration: 2/6/2024  Progress Note Due: 4/30/2023  Plan of Care Expiration: 2/15/2023 to 4/12/2023 --> updated 4/6/2023 to 06/30/2023  Visit # / Visits authorized: 11/40 (+eval)   FOTO: 4/6/2023(3/5)  PTA Visit #: 0/5    Precautions: Standard    Time In: 8:25 am   Time Out: 9:45 am    Total Billable Time: 43 minutes      SUBJECTIVE   Pt reports: See UPOC     She was not compliant with home exercise program.  Response to previous treatment: felt good, decreased pain  Functional change: None today    Pain: 2/10 sitting still, 1/10 at best, 5/10 at worst  Location: R shoulder     OBJECTIVE     Objective measures updated at progress report unless otherwise noted.    See UPOC       TREATMENT     Jessica received the bolded treatments listed below:      Re-assessment/UPOC    Patient received therapeutic exercises for 30 minutes for improved strength and AROM including:  Pulleys flexion x3 minutes  Rows with RTB 3x 10  Seated IR with towel roll to neutral x20  Seated ER with towel roll to neutral x20  Palloff press in sitting with double red theratube x10 bilateral   Ball press into mat with yellow physioball 3 second holds x20; circles CW/CCW x30 each    Shoulder isometrics flx/INTERNAL ROTATION/ER 20x5"                      Shoulder pendulum circles " "x20 ea                   Table slides flx/abd x20 ea  With MHP  Pulleys flexion/scaption with MHP, x3 min  Table circles x20   Wall slides x20  Ball press IR/ER iso with pilates ring 3" hold x20    Shoulder pendulum hang 2'    Consider gentle isometric shoulder exercises.     Patient received manual therapeutic technique for 3 minutes for improved soft tissue and joint mobility including:  Placement of lidocane patch brought in by patient, per request as she has difficulty applying by herself.    PROM R shoulder  Grade 1-2 joint glides in all directions for decreased muscle guarding and pain   STM to R shoulder    Patient received neuromuscular reeducation for 00 minutes for improved proprioception and balance including:        Patient received therapeutic activities for 00 minutes for improved tolerance to functional activities including:      Jessica received hot pack for 10 minutes to increase circulation and promote tissue healing prior to exercises.    Jessica received cold pack for 10 minutes to to decrease circulation, pain, and swelling after exercises.      PATIENT EDUCATION AND HOME EXERCISES     Home Exercises Provided and Patient Education Provided     Education provided:   PT educated pt on importance of compliance with their HEP this visit.     Written Home Exercises Provided: Patient instructed to cont prior HEP. Exercises were reviewed and Jessica was able to demonstrate them prior to the end of the session.  Jessica demonstrated fair  understanding of the education provided. See EMR under Patient Instructions for exercises provided during therapy sessions    ASSESSMENT   See UPOC     Jessica Is not progressing well towards her goals.   Pt prognosis is Fair.     Pt will continue to benefit from skilled outpatient physical therapy to address the deficits listed in the problem list box on initial evaluation, provide pt/family education and to maximize pt's level of independence in the home and community " environment.     Pt's spiritual, cultural and educational needs considered and pt agreeable to plan of care and goals.     Anticipated barriers to physical therapy: None    Goals:    Short Term Goals:  4 weeks  1.Report decreased R shoulder pain pain < / =  6/10  to increase tolerance for reaching MET  2. Increase PROM to 120 degrees R shoulder flexion MET  3. Increased strength by 1/3 MMT grade in R shoulder abduction to increase tolerance for ADL and work activities. (progressing, not met)  4. Pt to tolerate HEP to improve ROM and independence with ACTIVITIES OF DAILY LIVING's MET     Long Term Goals: 8 weeks  1.Report decreased R shoulder pain  < / =  3 /10  to increase tolerance for lifting (progressing, not met)  2.Increase AROM to 150 deg R shoulder flexion (progressing, not met)  3.Increase strength to >/= 4/5 in R shoulder abduction to increase tolerance for ADL and work activities. (progressing, not met)  4. Pt goal: perform ADLs without shoulder pain. (progressing, not met)  5. Pt will have improved gcode of CJ (20-40% limited) on FOTO shoulder in order to demonstrate true functional improvement. (progressing, not met)    PLAN   Plan of care Certification: 2/15/2023 to 4/12/2023    Improve R shoulder ROM and strength     Updated Certification Period: 4/6/2023 to 06/30/2023  Recommended Treatment Plan: 2 times per week for 12 weeks: Manual Therapy, Moist Heat/ Ice, Neuromuscular Re-ed, Patient Education, Therapeutic Activities, and Therapeutic Exercise      Leila Hairston, PT

## 2023-04-06 NOTE — PLAN OF CARE
Outpatient Therapy Updated Plan of Care     Visit Date: 4/6/2023  Name: Jessica Floyd  Clinic Number: [id number]    Therapy Diagnosis:   Encounter Diagnoses   Name Primary?    Localized primary osteoarthritis of right shoulder region Yes    Traumatic complete tear of right rotator cuff, initial encounter      Physician: Armani Cai, KEYSHA    Physician Orders: Physical Therapy Evaluate and Treat  Medical Diagnosis from Referral: Traumatic complete tear of right rotator cuff, initial encounter (S46.011A); Localized primary osteoarthritis of right shoulder region (M19.011 )  Evaluation Date: 2/15/2023      Prior Certification Period: 02/15/2023 to 4/12/2023  Current Certification Period:  4/6/2023 to 06/30/2023    Visits from Evaluation Date:  11  Total Visits Received: 12  Cancelled Visits: 3  No Show Visits: 0    Precautions:  Standard    Functional Level Prior to Evaluation:  no limitations    Subjective     Update: she feels like therapy is going okay.  She is unable to do surgery as long as she has the anemia and blood loss problem.  She has an appointment next week to continue addressing this.  She has been using the pulleys and the ball at home which seem to be helping.  She is now able to reach up and behind the head today which is a first.  She is exercising it at least 3x/day because she really wants this to get rid of this pain.  She is now able to clip her plants.  She feels like she has made a lot of progress and is very happy about this but still has difficulty reaching and carrying things that are way up high.  She does have some difficulty remaining with repetitive movements related to cooking     Pain: 2/10 sitting still, 1/10 at best, 5/10 at worst  Location: R shoulder    Objective     Update:     Observation: ambulates with SPC in RUE     Posture: forward shoulders, forward head posture     Passive Range of Motion:  (taken 3/21)  Shoulder Right Left   Flexion 150* 160   Abduction 110* 155   ER at  45 40*     ER at 90   75   IR 45* 55      Active Range of Motion:   Shoulder Right Left   Flexion 121* 125   Abduction 120  80    ER  T2 T2    IR  TL junction T9      Strength:  Shoulder Right Left   Flexion 3+/5* 4/5   Abduction 3+/5* 4/5   ER 3/5* 4-/5   IR 4-/5* 4/5         Palpation: TTP at biceps tendon, supraspinatus, infraspinatus, UT      CMS Impairment/Limitation/Restriction for FOTO Shoulder Survey    Therapist reviewed FOTO scores for Jessica Floyd on 4/6/2023.   FOTO documents entered into [employer] - see Media section.    Limitation Score: 34%  Category: Other    Current : CJ = at least 20% but < 40% impaired, limited or restricted  Goal: CJ = at least 20% but < 40% impaired, limited or restricted          Assessment     Update: Patient demonstrates significant improvement with AROM overall since starting therapy.  She continues to improve with functional mobility allowing her to perform more ADLs and household tasks than before.  She continues to have impairments related to strength affecting her ability to lift and carry things as well as repetitive motions frequently involved in cooking and other household tasks.     Short Term Goals:  4 weeks  1.Report decreased R shoulder pain pain < / =  6/10  to increase tolerance for reaching MET  2. Increase PROM to 120 degrees R shoulder flexion MET  3. Increased strength by 1/3 MMT grade in R shoulder abduction to increase tolerance for ADL and work activities. (progressing, not met)  4. Pt to tolerate HEP to improve ROM and independence with ACTIVITIES OF DAILY LIVING's MET     Long Term Goals: 8 weeks  1.Report decreased R shoulder pain  < / =  3 /10  to increase tolerance for lifting (progressing, not met)  2.Increase AROM to 150 deg R shoulder flexion (progressing, not met)  3.Increase strength to >/= 4/5 in R shoulder abduction to increase tolerance for ADL and work activities. (progressing, not met)  4. Pt goal: perform ADLs without shoulder pain.  (progressing, not met)  5. Pt will have improved gcode of CJ (20-40% limited) on FOTO shoulder in order to demonstrate true functional improvement. (progressing, not met)    Previous Short Term Goals Status:   in progress  New Short Term Goals Status:   3 of 4 STG MET, 1 in progress  Long Term Goal Status:   continue per initial plan of care.  Reasons for Recertification of Therapy:   Patient will continue to benefit from additional skilled physical therapy to address remaining impairments as noted above including AROM, PROM, strength, and functional mobility.  These impairments are negatively affecting her participation in ADLs, household activities and recreational activities.     Plan     Updated Certification Period: 4/6/2023 to 06/30/2023  Recommended Treatment Plan: 2 times per week for 12 weeks: Manual Therapy, Moist Heat/ Ice, Neuromuscular Re-ed, Patient Education, Therapeutic Activities, and Therapeutic Exercise    Leila Hairston, PT  4/6/2023

## 2023-04-10 ENCOUNTER — OFFICE VISIT (OUTPATIENT)
Dept: SPORTS MEDICINE | Facility: CLINIC | Age: 73
End: 2023-04-10
Payer: MEDICARE

## 2023-04-10 VITALS
WEIGHT: 130.75 LBS | HEIGHT: 63 IN | BODY MASS INDEX: 23.17 KG/M2 | SYSTOLIC BLOOD PRESSURE: 132 MMHG | DIASTOLIC BLOOD PRESSURE: 78 MMHG

## 2023-04-10 DIAGNOSIS — M19.011 LOCALIZED PRIMARY OSTEOARTHRITIS OF RIGHT SHOULDER REGION: ICD-10-CM

## 2023-04-10 DIAGNOSIS — S46.011A TRAUMATIC COMPLETE TEAR OF RIGHT ROTATOR CUFF, INITIAL ENCOUNTER: Primary | ICD-10-CM

## 2023-04-10 PROCEDURE — 99213 PR OFFICE/OUTPT VISIT, EST, LEVL III, 20-29 MIN: ICD-10-PCS | Mod: HCNC,S$GLB,, | Performed by: PHYSICIAN ASSISTANT

## 2023-04-10 PROCEDURE — 3288F FALL RISK ASSESSMENT DOCD: CPT | Mod: HCNC,CPTII,S$GLB, | Performed by: PHYSICIAN ASSISTANT

## 2023-04-10 PROCEDURE — 3075F SYST BP GE 130 - 139MM HG: CPT | Mod: HCNC,CPTII,S$GLB, | Performed by: PHYSICIAN ASSISTANT

## 2023-04-10 PROCEDURE — 1101F PR PT FALLS ASSESS DOC 0-1 FALLS W/OUT INJ PAST YR: ICD-10-PCS | Mod: HCNC,CPTII,S$GLB, | Performed by: PHYSICIAN ASSISTANT

## 2023-04-10 PROCEDURE — 1160F RVW MEDS BY RX/DR IN RCRD: CPT | Mod: HCNC,CPTII,S$GLB, | Performed by: PHYSICIAN ASSISTANT

## 2023-04-10 PROCEDURE — 3078F DIAST BP <80 MM HG: CPT | Mod: HCNC,CPTII,S$GLB, | Performed by: PHYSICIAN ASSISTANT

## 2023-04-10 PROCEDURE — 99999 PR PBB SHADOW E&M-EST. PATIENT-LVL III: ICD-10-PCS | Mod: PBBFAC,HCNC,, | Performed by: PHYSICIAN ASSISTANT

## 2023-04-10 PROCEDURE — 3288F PR FALLS RISK ASSESSMENT DOCUMENTED: ICD-10-PCS | Mod: HCNC,CPTII,S$GLB, | Performed by: PHYSICIAN ASSISTANT

## 2023-04-10 PROCEDURE — 3078F PR MOST RECENT DIASTOLIC BLOOD PRESSURE < 80 MM HG: ICD-10-PCS | Mod: HCNC,CPTII,S$GLB, | Performed by: PHYSICIAN ASSISTANT

## 2023-04-10 PROCEDURE — 99213 OFFICE O/P EST LOW 20 MIN: CPT | Mod: HCNC,S$GLB,, | Performed by: PHYSICIAN ASSISTANT

## 2023-04-10 PROCEDURE — 1159F MED LIST DOCD IN RCRD: CPT | Mod: HCNC,CPTII,S$GLB, | Performed by: PHYSICIAN ASSISTANT

## 2023-04-10 PROCEDURE — 1125F PR PAIN SEVERITY QUANTIFIED, PAIN PRESENT: ICD-10-PCS | Mod: HCNC,CPTII,S$GLB, | Performed by: PHYSICIAN ASSISTANT

## 2023-04-10 PROCEDURE — 99999 PR PBB SHADOW E&M-EST. PATIENT-LVL III: CPT | Mod: PBBFAC,HCNC,, | Performed by: PHYSICIAN ASSISTANT

## 2023-04-10 PROCEDURE — 3008F BODY MASS INDEX DOCD: CPT | Mod: HCNC,CPTII,S$GLB, | Performed by: PHYSICIAN ASSISTANT

## 2023-04-10 PROCEDURE — 1125F AMNT PAIN NOTED PAIN PRSNT: CPT | Mod: HCNC,CPTII,S$GLB, | Performed by: PHYSICIAN ASSISTANT

## 2023-04-10 PROCEDURE — 1159F PR MEDICATION LIST DOCUMENTED IN MEDICAL RECORD: ICD-10-PCS | Mod: HCNC,CPTII,S$GLB, | Performed by: PHYSICIAN ASSISTANT

## 2023-04-10 PROCEDURE — 3075F PR MOST RECENT SYSTOLIC BLOOD PRESS GE 130-139MM HG: ICD-10-PCS | Mod: HCNC,CPTII,S$GLB, | Performed by: PHYSICIAN ASSISTANT

## 2023-04-10 PROCEDURE — 1160F PR REVIEW ALL MEDS BY PRESCRIBER/CLIN PHARMACIST DOCUMENTED: ICD-10-PCS | Mod: HCNC,CPTII,S$GLB, | Performed by: PHYSICIAN ASSISTANT

## 2023-04-10 PROCEDURE — 1101F PT FALLS ASSESS-DOCD LE1/YR: CPT | Mod: HCNC,CPTII,S$GLB, | Performed by: PHYSICIAN ASSISTANT

## 2023-04-10 PROCEDURE — 3008F PR BODY MASS INDEX (BMI) DOCUMENTED: ICD-10-PCS | Mod: HCNC,CPTII,S$GLB, | Performed by: PHYSICIAN ASSISTANT

## 2023-04-10 NOTE — PROGRESS NOTES
ESTABLISHED PATIENT    History 4/10/2023:  Jessica returns here today for follow-up evaluation of right shoulder.  She has been undergoing physical therapy for her large rotator cuff tear and glenohumeral joint osteoarthritis.  There has been improvement in her pain and function since her last visit.  She is still having difficulty performing simple ADLs such as cooking and trouble lifting objects overhead, but overall she seems to be very happy with her progress.    History 02/28/2023:  Jessica returns here today for follow-up of her right shoulder.  She has a large rotator cuff tear and underlying osteoarthritis.  She has previously failed a subacromial corticosteroid injection and we are attempting to manage her symptoms conservatively.  She was only able to attend 1 visit with physical therapy due to having uncontrollable epistaxis and having to go to the emergency room and ENT several times over the last 2 weeks.  She complains of having significant right shoulder pain and stiffness.    History 02/06/2023:  Jessica returns here today for follow-up evaluation of her right shoulder.  She was given a subacromial corticosteroid injection at her last visit.  While attempting to lift herself up from a restroom toilet, she felt a pop in her shoulder and had immediate discomfort.  She was seen in the emergency room for this.  A CT arthrogram was ordered for further evaluation of her rotator cuff.  She is here today to discuss the results.  She has been using a shoulder sling intermittently for pain relief.    History 01/23/2023:  Jessica returns here today for follow-up evaluation of her right shoulder.  She states that the Medrol Dosepak gave her very minimal relief.  She has had worsening pain and weakness since her last visit.  She is having a lot of difficulty getting dressed and sleeping at night.  She has been taking Tylenol p.m. at night which has helped her get some sleep.    Of note, she states that her knee pain  has resolved following the Orthovisc series.    History 01/10/2023:  72 y.o. Female with a history of right shoulder pain who began having pain 1 week ago.  She denies having any precipitating injury or trauma but states that she was moving a lot of boxes which may have irritated her shoulder.  She is having a lot of discomfort with movement and at night while sleeping.  She has had difficulty getting dressed due to the pain and is frequently dropping objects due to weakness.        + mechanical symptoms, - instability    Is affecting ADLs.  Pain is 4/10 at it's worst.        PAST MEDICAL HISTORY    Past Medical History:   Diagnosis Date    Allergic rhinitis     Amblyopia     Carotid stenosis     Coronary atherosclerosis     Outside [facility] 2014: 20% mLAD, 50% mCx, 20%, mRCA    Dyslipidemia     ESBL (extended spectrum beta-lactamase) producing bacteria infection     UTI    Hypertension     Nausea and vomiting 2017    Subarachnoid hemorrhage due to ruptured aneurysm        PAST SURGICAL HISTORY     Past Surgical History:   Procedure Laterality Date    ADENOIDECTOMY      ANTERIOR CRUCIATE LIGAMENT REPAIR      APPENDECTOMY      CATARACT EXTRACTION W/  INTRAOCULAR LENS IMPLANT Right 2022    Procedure: EXTRACTION, CATARACT, WITH IOL INSERTION;  Surgeon: Higinio Cristina MD;  Location: [facility] OR;  Service: Ophthalmology;  Laterality: Right;    CATARACT EXTRACTION W/  INTRAOCULAR LENS IMPLANT Left 2022    Procedure: EXTRACTION, CATARACT, WITH IOL INSERTION;  Surgeon: Higinio Cristina MD;  Location: [facility];  Service: Ophthalmology;  Laterality: Left;    CEREBRAL ANEURYSM REPAIR      clip     SECTION      DENTAL SURGERY      EYE SURGERY Bilateral     cataract removal and lens implants    TONSILLECTOMY         FAMILY HISTORY    Family History   Problem Relation Age of Onset    Hypertension Mother     Aneurysm Mother     Hypertension Father     Alcohol abuse Father     Kidney disease Brother      Heart disease Brother     Gout Brother     No Known Problems Daughter     No Known Problems Daughter     No Known Problems Daughter        SOCIAL HISTORY    Social History     Socioeconomic History    Marital status:    Occupational History    Occupation: Retired   Tobacco Use    Smoking status: Former     Packs/day: 0.50     Years: 20.00     Pack years: 10.00     Types: Cigarettes     Quit date: 1999     Years since quittin.2     Passive exposure: Past    Smokeless tobacco: Never   Substance and Sexual Activity    Alcohol use: Yes     Alcohol/week: 7.0 standard drinks     Types: 7 Glasses of wine per week    Drug use: No    Sexual activity: Yes     Partners: Male   Social History Narrative    .  Has 3 grown daughters.   lives in [facility] [place].       Social Determinants of Health     Financial Resource Strain: Low Risk     Difficulty of Paying Living Expenses: Not hard at all   Food Insecurity: No Food Insecurity    Worried About Running Out of Food in the Last Year: Never true    Ran Out of Food in the Last Year: Never true   Transportation Needs: Unmet Transportation Needs    Lack of Transportation (Medical): Yes    Lack of Transportation (Non-Medical): Yes   Physical Activity: Inactive    Days of Exercise per Week: 0 days    Minutes of Exercise per Session: 0 min   Stress: Stress Concern Present    Feeling of Stress : To some extent   Social Connections: Moderately Integrated    Frequency of Communication with Friends and Family: More than three times a week    Frequency of Social Gatherings with Friends and Family: More than three times a week    Attends Shinto Services: More than 4 times per year    Active Member of Clubs or Organizations: No    Attends Club or Organization Meetings: Never    Marital Status:    Housing Stability: Unknown    Unable to Pay for Housing in the Last Year: No    Unstable Housing in the Last Year: No       MEDICATIONS      Current Outpatient  Medications:     allopurinoL (ZYLOPRIM) 100 MG tablet, Take 0.5 tablets (50 mg total) by mouth once daily., Disp: 30 tablet, Rfl: 11    amLODIPine (NORVASC) 10 MG tablet, Take 1 tablet (10 mg total) by mouth once daily., Disp: 90 tablet, Rfl: 3    atorvastatin (LIPITOR) 80 MG tablet, TAKE 1 TABLET BY MOUTH EVERY DAY, Disp: 90 tablet, Rfl: 3    carvediloL (COREG) 12.5 MG tablet, Take 1 tablet (12.5 mg total) by mouth 2 (two) times daily with meals., Disp: 180 tablet, Rfl: 3    colchicine (MITIGARE) 0.6 mg Cap, Take 1 capsule (0.6 mg total) by mouth once daily., Disp: 30 capsule, Rfl: 11    fluticasone propionate (FLONASE) 50 mcg/actuation nasal spray, SPRAY ONCE INTO EACH NOSTRIL ONCE DAILY, Disp: 16 mL, Rfl: 3    hydroCHLOROthiazide (HYDRODIURIL) 25 MG tablet, TAKE 1 TABLET BY MOUTH EVERY DAY, Disp: 90 tablet, Rfl: 3    hydrocortisone 2.5 % ointment, as needed., Disp: , Rfl:     ibuprofen (ADVIL,MOTRIN) 600 MG tablet, Take 1 tablet (600 mg total) by mouth every 6 (six) hours as needed for Pain., Disp: 20 tablet, Rfl: 0    ketoconazole (NIZORAL) 2 % cream, APPLY BETWEEN TOES TWICE DAILY X 2 WKS, Disp: , Rfl:     LIDOcaine (LIDODERM) 5 %, Place 1 patch onto the skin once daily. Remove & Discard patch within 12 hours or as directed by MD, Disp: 15 patch, Rfl: 0    pantoprazole (PROTONIX) 40 MG tablet, TAKE 1 TABLET BY MOUTH EVERY DAY, Disp: 90 tablet, Rfl: 0  No current facility-administered medications for this visit.    Facility-Administered Medications Ordered in Other Visits:     sodium hyaluronate (EUFLEXXA) 10 mg/mL(mw 2.4 -3.6 million) injection 20 mg, 20 mg, Intra-articular, , Armani Cai PA-C, 20 mg at 11/14/22 0830    ALLERGIES     Review of patient's allergies indicates:  No Known Allergies      REVIEW OF SYSTEMS   Constitution: Negative. Negative for chills, fever and night sweats.   HENT: Negative for congestion and headaches.    Eyes: Negative for blurred vision, left vision loss and right vision  "loss.   Cardiovascular: Negative for chest pain and syncope.   Respiratory: Negative for cough and shortness of breath.    Endocrine: Negative for polydipsia, polyphagia and polyuria.   Hematologic/Lymphatic: Negative for bleeding problem. Does not bruise/bleed easily.   Skin: Negative for dry skin, itching and rash.   Musculoskeletal: Negative for falls. Positive for right shoulder pain and weakness.  Gastrointestinal: Negative for abdominal pain and bowel incontinence.   Genitourinary: Negative for bladder incontinence and nocturia.   Neurological: Negative for disturbances in coordination, loss of balance and seizures.   Psychiatric/Behavioral: Negative for depression. The patient does not have insomnia.    Allergic/Immunologic: Negative for hives and persistent infections.     PHYSICAL EXAMINATION    Vitals: /78 (BP Location: Right arm, Patient Position: Sitting, BP Method: Medium (Manual))   Ht 5' 3" (1.6 m)   Wt 59.3 kg (130 lb 11.7 oz)   BMI 23.16 kg/m²     General: The patient appears active and healthy with no apparent physical problems.  No disturbance of mood or affect is demonstrated. Alert and Oriented.    HEENT: Eyes normal, pupils equally round, nose normal.    Resp: Equal and symmetrical chest rises. No wheezing    CV: Regular rate    Neck: Supple; nonpainful range of motion.    Extremities: no cyanosis, clubbing, edema, or diffuse swelling.  Palpable pulses, good capillary refill of the digits.  No coolness, discoloration, edema or obvious varicosities.    Skin: no lesions noted.    Lymphatic: no detected adenopathy in the upper or lower extremities.    Neurologic: normal mental status, normal reflexes, normal gait and balance.  Patient is alert and oriented to person, place and time.  No flaccidity or spasticity is noted.  No motor or sensory deficits are noted.  Light touch is intact    Orthopaedic: Shoulder Musculoskeletal Exam    Inspection    Right      Right shoulder inspection is " normal.      Ecchymosis: none      Symmetry: symmetric      Masses: none      Skin tenting: none      Prior incision: none    Inspection additional comments: + sling to the RUE    Palpation    Right      Crepitus: no crepitus      Increased warmth: none      Tenderness: present        Anterior shoulder: moderate        Posterior shoulder: moderate        Clavicle: none        AC joint: mild        Sternoclavicular joint: none        Rotator cuff: moderate        Greater tuberosity: moderate        Superior pole of scapula: none        Inferior pole of scapula: none        Bicipital groove: mild        Proximal biceps: mild    Range of Motion    Right      Active ROM: abnormal and pain.       Passive ROM: abnormal and pain.       Active forward elevation: 130.       Passive forward elevation: 160.       Shoulder active abduction: 50.       Passive abduction: 80.       Active external rotation at side: 0.       Passive external rotation at side: 20.       Active external rotation in abduction: 10.       Passive external rotation in abduction: 20.       Internal rotation: side.     Left      Internal rotation: T10.     Strength    Right      External rotation: 3/5. External rotation is affected by pain.       Internal rotation: 4/5. Internal rotation is affected by pain.       Abduction: 3/5. Abduction is affected by pain.       Biceps: 5/5. Biceps are not affected by pain.       Triceps: 5/5. Triceps are not affected by pain.     Neurovascular    Right      Right shoulder nerve sensation is normal.    Scapula    Right      Right shoulder scapula is normal.    Special Tests    Right    Rotator Cuff Signs      Neer's test: positive       Bonilla test: positive       Supraspinatus: positive       Belly press test: positive       Painful arc test: positive       Drop arm test: positive     Biceps/trini Signs      Crystal's test: negative       Clicking/popping: negative     AC Joint Signs      Active horizontal adduction  pain: negative     Instability Signs      Joint laxity: negative       IMAGING    XR ARTHROGRAM SHOULDER RIGHT, INJECTION ONLY WITH CT TO FOLLOW (XPD)  Narrative: EXAMINATION:  XR ARTHROGRAM SHOULDER RIGHT, INJECTION ONLY WITH CT TO FOLLOW (XPD)    CLINICAL HISTORY:  Pain in right shoulder    TECHNIQUE:  Written informed consent was obtained and the patient was prepped and draped in a sterile fashion.  The right shoulder was localized under fluoroscopy and 1% lidocaine was used to achieve local anesthesia.  A 22-gauge spinal needle was inserted into the joint via an anterior approach and position was confirmed with low resistance injection.  Solution of 12 mL dilated Omnipaque 300 (50% contrast: 50% normal saline) was injected.  Needle was removed and adequate hemostasis was achieved.    Fluoroscopy time = 27 seconds    COMPARISON:  Shoulder radiograph 01/27/2020    FINDINGS:  Intra-articular contrast is noted.  Impression: Successful right shoulder arthrogram.  CT report to follow.    Electronically signed by resident: Misha Greenwood  Date:    02/03/2023  Time:    13:03    Electronically signed by: Musa Maloney MD  Date:    02/03/2023  Time:    15:02  CT Arthrogram Shoulder Right  Narrative: EXAMINATION:  CT ARTHROGRAM SHOULDER RIGHT    CLINICAL HISTORY:  Shoulder pain, rotator cuff disorder suspected, xray done;  Pain in right shoulder    TECHNIQUE:  Routine right multiplanar CT arthrogram shoulder performed after the intra-articular injection of contrast.    COMPARISON:  None    FINDINGS:  Rotator cuff: There is a large full-thickness rotator cuff tear involving the entire supra and infraspinatus tendons.  There is tendon retraction to the glenoid rim.  The teres minor and subscapularis are intact.  There is mild volume loss of both supra and infraspinatus muscles.  High-riding humeral head noted abutting the acromion.    Mild AC joint arthropathy.    The biceps tendon not identified, probably torn.    Generalized  cartilage thinning/joint space narrowing noted with mild osteophytosis along the inferior margin of the humeral head.  No fractures or marrow replacement process.  No evidence of osteomyelitis.  No axillary lymphadenopathy.  Emphysematous changes noted in the right lung apex.  Impression: Large full-thickness rotator cuff tear, involving the infraspinatus and supraspinatus tendons, as above.    This report was flagged in Epic as abnormal.    Electronically signed by: Musa Maloney MD  Date:    02/03/2023  Time:    13:22        IMPRESSION       ICD-10-CM ICD-9-CM   1. Traumatic complete tear of right rotator cuff, initial encounter  S46.011A 840.4   2. Localized primary osteoarthritis of right shoulder region  M19.011 715.11         MEDICATIONS PRESCRIBED      None    RECOMMENDATIONS     Surgical and nonsurgical treatment options for rotator cuff tear with underlying OA were discussed again today; she is responding well to conservative care and wants to avoid surgery  Continue physical therapy for right shoulder strengthening and range of motion; including modalities such as dry needling and manual manipulation  Tylenol 1000 milligrams by mouth 3 times daily as needed for pain  Return to clinic as needed      All questions were answered, pt will contact us for questions or concerns in the interim.

## 2023-04-12 ENCOUNTER — OFFICE VISIT (OUTPATIENT)
Dept: OTOLARYNGOLOGY | Facility: CLINIC | Age: 73
End: 2023-04-12
Payer: MEDICARE

## 2023-04-12 ENCOUNTER — CLINICAL SUPPORT (OUTPATIENT)
Dept: REHABILITATION | Facility: OTHER | Age: 73
End: 2023-04-12
Payer: MEDICARE

## 2023-04-12 VITALS — BODY MASS INDEX: 23.55 KG/M2 | WEIGHT: 132.94 LBS

## 2023-04-12 DIAGNOSIS — S46.011A TRAUMATIC COMPLETE TEAR OF RIGHT ROTATOR CUFF, INITIAL ENCOUNTER: ICD-10-CM

## 2023-04-12 DIAGNOSIS — J30.1 NON-SEASONAL ALLERGIC RHINITIS DUE TO POLLEN: ICD-10-CM

## 2023-04-12 DIAGNOSIS — J32.0 CHRONIC MAXILLARY SINUSITIS: Primary | ICD-10-CM

## 2023-04-12 DIAGNOSIS — R04.0 NASAL BLEEDING: ICD-10-CM

## 2023-04-12 DIAGNOSIS — M19.011 LOCALIZED PRIMARY OSTEOARTHRITIS OF RIGHT SHOULDER REGION: Primary | ICD-10-CM

## 2023-04-12 DIAGNOSIS — J33.9 NASAL POLYPOSIS: ICD-10-CM

## 2023-04-12 PROCEDURE — 1160F RVW MEDS BY RX/DR IN RCRD: CPT | Mod: CPTII,S$GLB,, | Performed by: OTOLARYNGOLOGY

## 2023-04-12 PROCEDURE — 31231 NASAL ENDOSCOPY DX: CPT | Mod: S$GLB,,, | Performed by: OTOLARYNGOLOGY

## 2023-04-12 PROCEDURE — 97110 THERAPEUTIC EXERCISES: CPT | Mod: HCNC,PN

## 2023-04-12 PROCEDURE — 3288F PR FALLS RISK ASSESSMENT DOCUMENTED: ICD-10-PCS | Mod: CPTII,S$GLB,, | Performed by: OTOLARYNGOLOGY

## 2023-04-12 PROCEDURE — 1101F PR PT FALLS ASSESS DOC 0-1 FALLS W/OUT INJ PAST YR: ICD-10-PCS | Mod: CPTII,S$GLB,, | Performed by: OTOLARYNGOLOGY

## 2023-04-12 PROCEDURE — 31231 PR NASAL ENDOSCOPY, DX: ICD-10-PCS | Mod: S$GLB,,, | Performed by: OTOLARYNGOLOGY

## 2023-04-12 PROCEDURE — 1159F PR MEDICATION LIST DOCUMENTED IN MEDICAL RECORD: ICD-10-PCS | Mod: CPTII,S$GLB,, | Performed by: OTOLARYNGOLOGY

## 2023-04-12 PROCEDURE — 99999 PR PBB SHADOW E&M-EST. PATIENT-LVL III: ICD-10-PCS | Mod: PBBFAC,,, | Performed by: OTOLARYNGOLOGY

## 2023-04-12 PROCEDURE — 99999 PR PBB SHADOW E&M-EST. PATIENT-LVL III: CPT | Mod: PBBFAC,,, | Performed by: OTOLARYNGOLOGY

## 2023-04-12 PROCEDURE — 1101F PT FALLS ASSESS-DOCD LE1/YR: CPT | Mod: CPTII,S$GLB,, | Performed by: OTOLARYNGOLOGY

## 2023-04-12 PROCEDURE — 3288F FALL RISK ASSESSMENT DOCD: CPT | Mod: CPTII,S$GLB,, | Performed by: OTOLARYNGOLOGY

## 2023-04-12 PROCEDURE — 99214 OFFICE O/P EST MOD 30 MIN: CPT | Mod: 25,S$GLB,, | Performed by: OTOLARYNGOLOGY

## 2023-04-12 PROCEDURE — 97140 MANUAL THERAPY 1/> REGIONS: CPT | Mod: HCNC,PN

## 2023-04-12 PROCEDURE — 3008F PR BODY MASS INDEX (BMI) DOCUMENTED: ICD-10-PCS | Mod: CPTII,S$GLB,, | Performed by: OTOLARYNGOLOGY

## 2023-04-12 PROCEDURE — 1125F PR PAIN SEVERITY QUANTIFIED, PAIN PRESENT: ICD-10-PCS | Mod: CPTII,S$GLB,, | Performed by: OTOLARYNGOLOGY

## 2023-04-12 PROCEDURE — 1160F PR REVIEW ALL MEDS BY PRESCRIBER/CLIN PHARMACIST DOCUMENTED: ICD-10-PCS | Mod: CPTII,S$GLB,, | Performed by: OTOLARYNGOLOGY

## 2023-04-12 PROCEDURE — 1125F AMNT PAIN NOTED PAIN PRSNT: CPT | Mod: CPTII,S$GLB,, | Performed by: OTOLARYNGOLOGY

## 2023-04-12 PROCEDURE — 3008F BODY MASS INDEX DOCD: CPT | Mod: CPTII,S$GLB,, | Performed by: OTOLARYNGOLOGY

## 2023-04-12 PROCEDURE — 99214 PR OFFICE/OUTPT VISIT, EST, LEVL IV, 30-39 MIN: ICD-10-PCS | Mod: 25,S$GLB,, | Performed by: OTOLARYNGOLOGY

## 2023-04-12 PROCEDURE — 1159F MED LIST DOCD IN RCRD: CPT | Mod: CPTII,S$GLB,, | Performed by: OTOLARYNGOLOGY

## 2023-04-12 RX ORDER — MONTELUKAST SODIUM 10 MG/1
10 TABLET ORAL NIGHTLY
Qty: 90 TABLET | Refills: 6 | Status: ON HOLD | OUTPATIENT
Start: 2023-04-12 | End: 2023-05-27

## 2023-04-12 RX ORDER — AMOXICILLIN AND CLAVULANATE POTASSIUM 875; 125 MG/1; MG/1
1 TABLET, FILM COATED ORAL 2 TIMES DAILY
Qty: 14 TABLET | Refills: 0 | Status: SHIPPED | OUTPATIENT
Start: 2023-04-12 | End: 2023-04-19

## 2023-04-12 RX ORDER — METHYLPREDNISOLONE 4 MG/1
TABLET ORAL
Qty: 21 EACH | Refills: 0 | Status: SHIPPED | OUTPATIENT
Start: 2023-04-12 | End: 2023-05-03

## 2023-04-12 NOTE — PATIENT INSTRUCTIONS
Please start Nasal Saline irrigation with NeilMed sinus rinse or equivalent over the counter once a day. Please use Distilled or Boiled water with packets as instructed on packaging.  continue Flonase as prescribed and spray to the outside (towards eye/ear) as show in clinic

## 2023-04-12 NOTE — PROGRESS NOTES
[facility]   Physical Therapy Daily Treatment Note     Name: Jessica Floyd  Clinic Number: [id number]    Therapy Diagnosis:   Encounter Diagnoses   Name Primary?    Traumatic complete tear of right rotator cuff, initial encounter      Localized primary osteoarthritis of right shoulder region      Physician: Armani Cai PA-C    Visit Date: 4/12/2023    Physician Orders: Physical Therapy Evaluate and Treat  Medical Diagnosis from Referral: Traumatic complete tear of right rotator cuff, initial encounter (S46.011A); Localized primary osteoarthritis of right shoulder region (M19.011 )  Evaluation Date: 2/15/2023                 Authorization Period Expiration: 2/6/2024  Progress Note Due: 4/30/2023  Plan of Care Expiration: 4/6/2023 to 06/30/2023  Visit # / Visits authorized: 12/40 (+eval)   FOTO: 4/6/2023(3/5)  PTA Visit #: 0/5    Precautions: Standard    Time In: 8:55 am    Time Out: 10:10 am     Total Billable Time: 55 minutes      SUBJECTIVE   Pt reports: 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  She was carrying lots of heavy things (trays filled with food)She also reports hearing a cracking noise and experiencing a lot of pain opening the door to the building today. She also reports having to tk a raccoon around her house and break up an encounter between her dog and a possum.      She was not compliant with home exercise program.  Response to previous treatment: felt good, decreased pain  Functional change: None today    Pain: 3/10 sitting still  Location: R shoulder     OBJECTIVE     Objective measures updated at progress report unless otherwise noted.      TREATMENT     Jessica received the bolded treatments listed below:      Patient received therapeutic exercises for 40 minutes for improved strength and AROM including:  Pulleys flexion x3 minutes  Seated Rows with RTB 4x 10  Seated extension with RTB 3x 10  Seated IR with towel  "roll to neutral 3x 10  Seated ER with towel roll to neutral 3x 10  Seated extension with 1/2 foam 2x 15 with 5 second holds   Shoulder isometrics flx/INTERNAL ROTATION/ER 10x 10 second hold          Palloff press in sitting with double red theratube x10 bilateral   Ball press into mat with yellow physioball 3 second holds x20; circles CW/CCW x30 each  Shoulder pendulum circles x20 ea                   Table slides flx/abd x20 ea  Table circles x20   Wall slides x20  Ball press IR/ER iso with pilates ring 3" hold x20    Shoulder pendulum hang 2'    Patient received manual therapeutic technique for 15 minutes for improved soft tissue and joint mobility including:  Shoulder PROM flexion/ABD/ER  A/P and inferior GHJ mobilizations grade I-II with PROM  Oscillations with PROM for relaxation  STM to global shoulder    PROM R shoulder  Grade 1-2 joint glides in all directions for decreased muscle guarding and pain   STM to R shoulder    Patient received neuromuscular reeducation for 00 minutes for improved proprioception and balance including:        Patient received therapeutic activities for 00 minutes for improved tolerance to functional activities including:      Jessica received hot pack for 10 minutes to increase circulation and promote tissue healing prior to exercises.    Jessica received cold pack for 10 minutes to to decrease circulation, pain, and swelling after exercises.      PATIENT EDUCATION AND HOME EXERCISES     Home Exercises Provided and Patient Education Provided     Education provided:   PT educated pt on importance of compliance with their HEP this visit.     Written Home Exercises Provided: Patient instructed to cont prior HEP. Exercises were reviewed and Jessica was able to demonstrate them prior to the end of the session.  Jessica demonstrated fair  understanding of the education provided. See EMR under Patient Instructions for exercises provided during therapy sessions    ASSESSMENT      Held off with " progressions today due to increased pain reported today.  Patient's AROM appears visibly reduced from last visit which may be attributed to activities outside of therapy as reported by patient in subjective.  Limited tolerance to activities today and significant difficulty relaxing for PROM.  TTP with palpable decrease in soft tissue mass on right around AC joint which patient also notes increase in pain.  Advised return to physician if pain and limitations remain unchanged by next week.        Jessica Is not progressing well towards her goals.   Pt prognosis is Fair.     Pt will continue to benefit from skilled outpatient physical therapy to address the deficits listed in the problem list box on initial evaluation, provide pt/family education and to maximize pt's level of independence in the home and community environment.     Pt's spiritual, cultural and educational needs considered and pt agreeable to plan of care and goals.     Anticipated barriers to physical therapy: None    Goals:    Short Term Goals:  4 weeks  1.Report decreased R shoulder pain pain < / =  6/10  to increase tolerance for reaching MET  2. Increase PROM to 120 degrees R shoulder flexion MET  3. Increased strength by 1/3 MMT grade in R shoulder abduction to increase tolerance for ADL and work activities. (progressing, not met)  4. Pt to tolerate HEP to improve ROM and independence with ACTIVITIES OF DAILY LIVING's MET     Long Term Goals: 8 weeks  1.Report decreased R shoulder pain  < / =  3 /10  to increase tolerance for lifting (progressing, not met)  2.Increase AROM to 150 deg R shoulder flexion (progressing, not met)  3.Increase strength to >/= 4/5 in R shoulder abduction to increase tolerance for ADL and work activities. (progressing, not met)  4. Pt goal: perform ADLs without shoulder pain. (progressing, not met)  5. Pt will have improved gcode of CJ (20-40% limited) on FOTO shoulder in order to demonstrate true functional improvement.  (progressing, not met)    PLAN     Improve R shoulder ROM and strength     Updated Certification Period: 4/6/2023 to 06/30/2023  Recommended Treatment Plan: 2 times per week for 12 weeks: Manual Therapy, Moist Heat/ Ice, Neuromuscular Re-ed, Patient Education, Therapeutic Activities, and Therapeutic Exercise      Leila Hairston, PT

## 2023-04-13 NOTE — PROGRESS NOTES
History of Present Illness:   Jessica Floyd is a 72 y.o. year old female evaluated on 4/13/2023, in the Otolaryngology-Head and Neck Surgery Clinic at [facility].  Patient returns for re-evaluation after nasal bleeds with several ER visits requiring packing.  She had an excoriated area due to cautery on the septum and other findings that were mass by bleeding and I brought her back for a 2 month follow-up for repeat examination.  She reports no further bleeding.  She does have some sinus pressure with increased thick mucus.  She reports no fevers.    Initial HPI  The patient was referred in ER follow-up for evaluation of epistaxis.  Patient had 3 ER visits at [facility] on 02/17 and 02/18/2023 with return on the same day on the 2nd visit.  She had nosebleed that started at initial visit with no prior history of nosebleeds.  She was on prophylactic daily aspirin that she stop as soon as the 1st bleed.  She was putting tissues in her nose on the 1st bleed.  She was given Afrin soaked gauze on the 1st ER visit and then told to put lidocaine with epinephrine on gauze on the 2nd ER visit with return and then she presented to [facility] ER desiring to see ENT on 02/19/2023 where she had a rhino rocket placed.  Patient had since had follow-up with urgent care ENT and shell met where rhino rocket was removed at 48 hours and septum was cauterized on the right.  She returns today for evaluation.  She has not had no nosebleeds since the cauterization.  She denies any rhinorrhea or thick mucus or fevers.         Past Medical/Surgical History  Past Medical History:   Diagnosis Date    Allergic rhinitis     Amblyopia     Carotid stenosis     Coronary atherosclerosis     Outside [facility] 6/2014: 20% mLAD, 50% mCx, 20%, mRCA    Dyslipidemia     ESBL (extended spectrum beta-lactamase) producing bacteria infection     UTI    Hypertension     Nausea and vomiting 05/26/2017    Subarachnoid hemorrhage due to ruptured aneurysm       Her  has a past surgical history that includes Anterior cruciate ligament repair (); Dental surgery; Cerebral aneurysm repair (); Tonsillectomy; Adenoidectomy; Appendectomy;  section; Cataract extraction w/  intraocular lens implant (Right, 2022); Cataract extraction w/  intraocular lens implant (Left, 2022); and Eye surgery (Bilateral).     Past Family/Social History  Her family history includes Alcohol abuse in her father; Aneurysm in her mother; Gout in her brother; Heart disease in her brother; Hypertension in her father and mother; Kidney disease in her brother; No Known Problems in her daughter, daughter, and daughter.  She  reports that she quit smoking about 24 years ago. Her smoking use included cigarettes. She has a 10.00 pack-year smoking history. She has been exposed to tobacco smoke. She has never used smokeless tobacco. She reports current alcohol use of about 7.0 standard drinks per week. She reports that she does not use drugs.     Medications/Allergies/Immunizations  Her current medication(s) include:   Current Outpatient Medications   Medication Sig Dispense Refill    allopurinoL (ZYLOPRIM) 100 MG tablet Take 0.5 tablets (50 mg total) by mouth once daily. 30 tablet 11    amLODIPine (NORVASC) 10 MG tablet Take 1 tablet (10 mg total) by mouth once daily. 90 tablet 3    atorvastatin (LIPITOR) 80 MG tablet TAKE 1 TABLET BY MOUTH EVERY DAY 90 tablet 3    carvediloL (COREG) 12.5 MG tablet Take 1 tablet (12.5 mg total) by mouth 2 (two) times daily with meals. 180 tablet 3    colchicine (MITIGARE) 0.6 mg Cap Take 1 capsule (0.6 mg total) by mouth once daily. 30 capsule 11    fluticasone propionate (FLONASE) 50 mcg/actuation nasal spray SPRAY ONCE INTO EACH NOSTRIL ONCE DAILY 16 mL 3    hydroCHLOROthiazide (HYDRODIURIL) 25 MG tablet TAKE 1 TABLET BY MOUTH EVERY DAY 90 tablet 3    hydrocortisone 2.5 % ointment as needed.      ibuprofen (ADVIL,MOTRIN) 600 MG tablet Take 1  tablet (600 mg total) by mouth every 6 (six) hours as needed for Pain. 20 tablet 0    ketoconazole (NIZORAL) 2 % cream APPLY BETWEEN TOES TWICE DAILY X 2 WKS      LIDOcaine (LIDODERM) 5 % Place 1 patch onto the skin once daily. Remove & Discard patch within 12 hours or as directed by MD 15 patch 0    pantoprazole (PROTONIX) 40 MG tablet TAKE 1 TABLET BY MOUTH EVERY DAY 90 tablet 0    amoxicillin-clavulanate 875-125mg (AUGMENTIN) 875-125 mg per tablet Take 1 tablet by mouth 2 (two) times daily. for 7 days 14 tablet 0    methylPREDNISolone (MEDROL DOSEPACK) 4 mg tablet use as directed 21 each 0    montelukast (SINGULAIR) 10 mg tablet Take 1 tablet (10 mg total) by mouth every evening. 90 tablet 6     No current facility-administered medications for this visit.     Facility-Administered Medications Ordered in Other Visits   Medication Dose Route Frequency Provider Last Rate Last Admin    sodium hyaluronate (EUFLEXXA) 10 mg/mL(mw 2.4 -3.6 million) injection 20 mg  20 mg Intra-articular  Armani Cai PA-C   20 mg at 11/14/22 0830        Allergies: Patient has no known allergies.     Immunizations:   Immunization History   Administered Date(s) Administered    COVID-19, MRNA, LN-S, PF (Pfizer) (Gray Cap) 04/20/2022    COVID-19, MRNA, LN-S, PF (Pfizer) (Purple Cap) 01/09/2021, 01/30/2021, 08/23/2021    Influenza - High Dose - PF (65 years and older) 10/03/2016, 11/21/2017, 10/16/2018, 11/12/2019    Influenza - Quadrivalent - High Dose - PF (65 years and older) 10/02/2020, 10/08/2021, 09/24/2022    Pneumococcal Conjugate - 13 Valent 10/03/2016    Pneumococcal Polysaccharide - 23 Valent 11/21/2017    Tdap 08/04/2017    Zoster Recombinant 10/08/2021, 05/23/2022         Review of Systems   Constitutional: Negative for fever, weight loss and weight gain.  Skin: Negative for rash, itchiness, dryness  HENT:  As per HPI  Cardiovascular: Negative for chest pain and dyspnea on exertion .   Respiratory: Is not experiencing  shortness of breath.   Gastrointestinal: Negative for nausea and vomiting.   Neurological: Negative for headaches.   Lymph/Heme: Negative for lymphadenopathy or easy bruising  Musculoskeletal: Negative for joint or muscle pain  Psychiatric: The patient is not nervous/anxious.        All other systems are negative except for that listed in the HPI.      PHYSICAL EXAM:   Vital Signs:  Wt 60.3 kg (132 lb 15 oz)   BMI 23.55 kg/m²      General:  Well-developed, well-nourished  Communication and Voice:  Clear pitch and clarity  Hearing: Hearing adequate for verbal communication bilaterally   Inspection:  Normocephalic and atraumatic without mass or lesion  Palpation:  Facial skeleton intact without bony stepoffs  Parotid Glands:  No mass or tenderness  Facial Strength:  Facial motility symmetric and full bilaterally  Pinna:  External ear intact and fully developed  External canal:  Canal is patent with intact skin  Tympanic Membrane:  Clear and mobile  External nose:  No scar or anatomic deformity  Internal Nose:  Septum intact with healed previous area of excoriation on the right with septal deviation to the left see scope for details TMJ:  No pain to palpation with full mobility  Oral cavity, Lips, Teeth, and Gums:  Mucosa and teeth intact and viable, No lesions, masses or ulcers  Oropharynx: No erythema or exudate, no masses or ulcerations, non-obstructive tonsils no dripping noted or blood in the oropharynx  Nasopharynx:  No mass or lesion with intact mucosa  Hypopharynx:  Not well visualized secondary to gagging  Larynx:  Not well visualized secondary to gagging  Neck, Trachea, Lymphatics:  Midline trachea without mass or lesion, no lymphadenopathy  Thyroid:  No mass or nodularity  Eyes: No nystagmus with equal extraocular motion bilaterally  Neuro/Psych/Balance: Patient oriented and appropriate in interaction;  Appropriate mood and affect;  Gait is intact with no imbalance; Cranial nerves I-XII are  intact  Respiratory effort:  Equal inspiration and expiration without stridor  Peripheral Vascular:  Warm extremities with equal pulses      Procedure: Rigid endoscopic nasal exam   Indications:  Sinusitis  Surgeon: Glenda Eason MD  Procedure note/findings: After informed discussion of the risks, benefits, and alternatives after indications as noted above, nasal cavities were topically anesthetized and decongested with 4% lidocaine and Afrin solutions. A rigid 0 degree nasal endoscope was inserted into bilateral nasal cavities and the following was noted    Right:   Turbinates normal without hypertrophy  Osteomeatal complex without polyps or lesions      Left   Turbinates normal without hypertrophy  Osteomeatal complex with mucoid drainage with polyposis not fully visualized secondary to patient not tolerating scope well    Nasopharynx, similarly, revealed no mass lesion or granularity. There was no ulceration. There was no gross asymmetry. Fossa of Rosenmueller was intact bilaterally. The eustachian tube orifices were intact bilaterally and patent.  The scope was then withdrawn and removed. She tolerated this well.                  ASSESSMENT:   1. Chronic maxillary sinusitis    2. Nasal polyposis    3. Non-seasonal allergic rhinitis due to pollen    4. Nasal bleeding              PLAN:   Patient has polyposis in the left ostiomeatal complex with mucopurulent drainage.  I will place her on a course of Augmentin.  I will also place her on a Medrol Dosepak.  I recommended nasal saline irrigation and Flonase as well as starting Singulair for management of polyps.  We will likely hold off on any surgical intervention and I will see her back in several months to reassess after medical treatment.      I believe that Ms. Floyd has a good understanding of the issues involved and I answered all of her questions.     DISCLAIMER: This note was prepared with [employer] voice recognition transcription software. Garbled syntax,  mangled pronouns, and other bizarre constructions may be attributed to that software system. While efforts were made to correct any mistakes made by this voice recognition program, some errors and/or omissions may remain in the note that were missed when the note was originally created.

## 2023-04-17 ENCOUNTER — OFFICE VISIT (OUTPATIENT)
Dept: HEMATOLOGY/ONCOLOGY | Facility: CLINIC | Age: 73
End: 2023-04-17
Attending: FAMILY MEDICINE
Payer: MEDICARE

## 2023-04-17 VITALS
RESPIRATION RATE: 17 BRPM | WEIGHT: 121.06 LBS | BODY MASS INDEX: 21.45 KG/M2 | HEART RATE: 79 BPM | HEIGHT: 63 IN | OXYGEN SATURATION: 97 % | DIASTOLIC BLOOD PRESSURE: 81 MMHG | SYSTOLIC BLOOD PRESSURE: 155 MMHG | TEMPERATURE: 99 F

## 2023-04-17 DIAGNOSIS — D50.0 IRON DEFICIENCY ANEMIA SECONDARY TO BLOOD LOSS (CHRONIC): ICD-10-CM

## 2023-04-17 DIAGNOSIS — D50.8 OTHER IRON DEFICIENCY ANEMIA: Primary | ICD-10-CM

## 2023-04-17 DIAGNOSIS — N18.31 STAGE 3A CHRONIC KIDNEY DISEASE: ICD-10-CM

## 2023-04-17 DIAGNOSIS — D53.9 NUTRITIONAL ANEMIA: ICD-10-CM

## 2023-04-17 DIAGNOSIS — J32.0 CHRONIC MAXILLARY SINUSITIS: ICD-10-CM

## 2023-04-17 PROCEDURE — 99205 OFFICE O/P NEW HI 60 MIN: CPT | Mod: HCNC,S$GLB,, | Performed by: STUDENT IN AN ORGANIZED HEALTH CARE EDUCATION/TRAINING PROGRAM

## 2023-04-17 PROCEDURE — 99999 PR PBB SHADOW E&M-EST. PATIENT-LVL V: CPT | Mod: PBBFAC,HCNC,, | Performed by: STUDENT IN AN ORGANIZED HEALTH CARE EDUCATION/TRAINING PROGRAM

## 2023-04-17 PROCEDURE — 1101F PR PT FALLS ASSESS DOC 0-1 FALLS W/OUT INJ PAST YR: ICD-10-PCS | Mod: HCNC,CPTII,S$GLB, | Performed by: STUDENT IN AN ORGANIZED HEALTH CARE EDUCATION/TRAINING PROGRAM

## 2023-04-17 PROCEDURE — 1159F PR MEDICATION LIST DOCUMENTED IN MEDICAL RECORD: ICD-10-PCS | Mod: HCNC,CPTII,S$GLB, | Performed by: STUDENT IN AN ORGANIZED HEALTH CARE EDUCATION/TRAINING PROGRAM

## 2023-04-17 PROCEDURE — 1125F AMNT PAIN NOTED PAIN PRSNT: CPT | Mod: HCNC,CPTII,S$GLB, | Performed by: STUDENT IN AN ORGANIZED HEALTH CARE EDUCATION/TRAINING PROGRAM

## 2023-04-17 PROCEDURE — 99999 PR PBB SHADOW E&M-EST. PATIENT-LVL V: ICD-10-PCS | Mod: PBBFAC,HCNC,, | Performed by: STUDENT IN AN ORGANIZED HEALTH CARE EDUCATION/TRAINING PROGRAM

## 2023-04-17 PROCEDURE — 3008F PR BODY MASS INDEX (BMI) DOCUMENTED: ICD-10-PCS | Mod: HCNC,CPTII,S$GLB, | Performed by: STUDENT IN AN ORGANIZED HEALTH CARE EDUCATION/TRAINING PROGRAM

## 2023-04-17 PROCEDURE — 1125F PR PAIN SEVERITY QUANTIFIED, PAIN PRESENT: ICD-10-PCS | Mod: HCNC,CPTII,S$GLB, | Performed by: STUDENT IN AN ORGANIZED HEALTH CARE EDUCATION/TRAINING PROGRAM

## 2023-04-17 PROCEDURE — 1160F RVW MEDS BY RX/DR IN RCRD: CPT | Mod: HCNC,CPTII,S$GLB, | Performed by: STUDENT IN AN ORGANIZED HEALTH CARE EDUCATION/TRAINING PROGRAM

## 2023-04-17 PROCEDURE — 3079F DIAST BP 80-89 MM HG: CPT | Mod: HCNC,CPTII,S$GLB, | Performed by: STUDENT IN AN ORGANIZED HEALTH CARE EDUCATION/TRAINING PROGRAM

## 2023-04-17 PROCEDURE — 3008F BODY MASS INDEX DOCD: CPT | Mod: HCNC,CPTII,S$GLB, | Performed by: STUDENT IN AN ORGANIZED HEALTH CARE EDUCATION/TRAINING PROGRAM

## 2023-04-17 PROCEDURE — 1159F MED LIST DOCD IN RCRD: CPT | Mod: HCNC,CPTII,S$GLB, | Performed by: STUDENT IN AN ORGANIZED HEALTH CARE EDUCATION/TRAINING PROGRAM

## 2023-04-17 PROCEDURE — 3077F PR MOST RECENT SYSTOLIC BLOOD PRESSURE >= 140 MM HG: ICD-10-PCS | Mod: HCNC,CPTII,S$GLB, | Performed by: STUDENT IN AN ORGANIZED HEALTH CARE EDUCATION/TRAINING PROGRAM

## 2023-04-17 PROCEDURE — 3288F FALL RISK ASSESSMENT DOCD: CPT | Mod: HCNC,CPTII,S$GLB, | Performed by: STUDENT IN AN ORGANIZED HEALTH CARE EDUCATION/TRAINING PROGRAM

## 2023-04-17 PROCEDURE — 1101F PT FALLS ASSESS-DOCD LE1/YR: CPT | Mod: HCNC,CPTII,S$GLB, | Performed by: STUDENT IN AN ORGANIZED HEALTH CARE EDUCATION/TRAINING PROGRAM

## 2023-04-17 PROCEDURE — 1160F PR REVIEW ALL MEDS BY PRESCRIBER/CLIN PHARMACIST DOCUMENTED: ICD-10-PCS | Mod: HCNC,CPTII,S$GLB, | Performed by: STUDENT IN AN ORGANIZED HEALTH CARE EDUCATION/TRAINING PROGRAM

## 2023-04-17 PROCEDURE — 3077F SYST BP >= 140 MM HG: CPT | Mod: HCNC,CPTII,S$GLB, | Performed by: STUDENT IN AN ORGANIZED HEALTH CARE EDUCATION/TRAINING PROGRAM

## 2023-04-17 PROCEDURE — 3079F PR MOST RECENT DIASTOLIC BLOOD PRESSURE 80-89 MM HG: ICD-10-PCS | Mod: HCNC,CPTII,S$GLB, | Performed by: STUDENT IN AN ORGANIZED HEALTH CARE EDUCATION/TRAINING PROGRAM

## 2023-04-17 PROCEDURE — 99205 PR OFFICE/OUTPT VISIT, NEW, LEVL V, 60-74 MIN: ICD-10-PCS | Mod: HCNC,S$GLB,, | Performed by: STUDENT IN AN ORGANIZED HEALTH CARE EDUCATION/TRAINING PROGRAM

## 2023-04-17 PROCEDURE — 3288F PR FALLS RISK ASSESSMENT DOCUMENTED: ICD-10-PCS | Mod: HCNC,CPTII,S$GLB, | Performed by: STUDENT IN AN ORGANIZED HEALTH CARE EDUCATION/TRAINING PROGRAM

## 2023-04-17 NOTE — PROGRESS NOTES
Hematology- Oncology Clinic Note :      4/17/2023    RFV / chief complaint- Other iron deficiency anemia        HPI  Pt is a 72 y.o. female who  has a past medical history of Allergic rhinitis, Amblyopia, Carotid stenosis, Coronary atherosclerosis, Dyslipidemia, ESBL (extended spectrum beta-lactamase) producing bacteria infection, Hypertension, Nausea and vomiting (05/26/2017), and Subarachnoid hemorrhage due to ruptured aneurysm (1999).   Pt presents to the clinic today for anemia      C/o fatigue, feels poorly due to low energy  Onset of symptoms was gradual starting several weeks ago with unchanged course since that time. Symptoms are mild to moderate in intensity.  Patient denies chest pain, LOC, falls. Symptoms are aggravated by exertion. Symptoms improve with rest.   Bleeding- severe nose bleed in Feb 2023, went to the ER, rhino rocket was placed. Has seen ENT since then. No more bleeding recently. No other bleeding reported  Oral iron - has been taking with some gi side effects.      Reviewed past medical/surgical/social history    Past Medical History:   Diagnosis Date    Allergic rhinitis     Amblyopia     Carotid stenosis     Coronary atherosclerosis     Outside [facility] 6/2014: 20% mLAD, 50% mCx, 20%, mRCA    Dyslipidemia     ESBL (extended spectrum beta-lactamase) producing bacteria infection     UTI    Hypertension     Nausea and vomiting 05/26/2017    Subarachnoid hemorrhage due to ruptured aneurysm 1999      Past Surgical History:   Procedure Laterality Date    ADENOIDECTOMY      ANTERIOR CRUCIATE LIGAMENT REPAIR  2012    APPENDECTOMY      CATARACT EXTRACTION W/  INTRAOCULAR LENS IMPLANT Right 11/07/2022    Procedure: EXTRACTION, CATARACT, WITH IOL INSERTION;  Surgeon: Higinio Cristina MD;  Location: [facility];  Service: Ophthalmology;  Laterality: Right;    CATARACT EXTRACTION W/  INTRAOCULAR LENS IMPLANT Left 11/21/2022    Procedure: EXTRACTION, CATARACT, WITH IOL INSERTION;  Surgeon: Higinio Cristina MD;   Location: [facility];  Service: Ophthalmology;  Laterality: Left;    CEREBRAL ANEURYSM REPAIR      clip     SECTION      DENTAL SURGERY      EYE SURGERY Bilateral     cataract removal and lens implants    TONSILLECTOMY        Review of patient's allergies indicates:  No Known Allergies   Social History     Tobacco Use    Smoking status: Former     Packs/day: 0.50     Years: 20.00     Pack years: 10.00     Types: Cigarettes     Quit date: 1999     Years since quittin.3     Passive exposure: Past    Smokeless tobacco: Never   Substance Use Topics    Alcohol use: Yes     Alcohol/week: 7.0 standard drinks     Types: 7 Glasses of wine per week      Family History   Problem Relation Age of Onset    Hypertension Mother     Aneurysm Mother     Hypertension Father     Alcohol abuse Father     Kidney disease Brother     Heart disease Brother     Gout Brother     No Known Problems Daughter     No Known Problems Daughter     No Known Problems Daughter           Review of Systems :  Review of Systems   Constitutional:  Positive for malaise/fatigue. Negative for chills, diaphoresis, fever and weight loss.   HENT: Negative.  Negative for congestion, hearing loss, nosebleeds, sore throat and tinnitus.    Eyes: Negative.  Negative for blurred vision and discharge.   Respiratory:  Negative for cough, hemoptysis, sputum production, shortness of breath and wheezing.    Cardiovascular: Negative.  Negative for chest pain, palpitations and leg swelling.   Gastrointestinal: Negative.  Negative for abdominal pain, blood in stool, constipation, diarrhea, heartburn, melena, nausea and vomiting.   Genitourinary: Negative.    Musculoskeletal: Negative.  Negative for back pain, falls, joint pain and myalgias.   Skin: Negative.  Negative for itching and rash.   Neurological: Negative.  Negative for dizziness, tingling, sensory change, speech change, focal weakness, seizures, loss of consciousness, weakness and headaches.  "  Endo/Heme/Allergies: Negative.  Does not bruise/bleed easily.   Psychiatric/Behavioral: Negative.  Negative for depression. The patient is not nervous/anxious and does not have insomnia.              Physical Exam :  BP (!) 155/81 (BP Location: Left arm, Patient Position: Sitting, BP Method: Medium (Automatic))   Pulse 79   Temp 98.7 °F (37.1 °C) (Oral)   Resp 17   Ht 5' 3" (1.6 m)   Wt 54.9 kg (121 lb 0.5 oz)   SpO2 97%   BMI 21.44 kg/m²   Wt Readings from Last 3 Encounters:   04/17/23 54.9 kg (121 lb 0.5 oz)   04/12/23 60.3 kg (132 lb 15 oz)   04/10/23 59.3 kg (130 lb 11.7 oz)       Body mass index is 21.44 kg/m².      Physical Exam  Vitals and nursing note reviewed.   Constitutional:       General: She is not in acute distress.     Appearance: Normal appearance. She is not ill-appearing.   HENT:      Head: Normocephalic and atraumatic.      Right Ear: External ear normal.      Left Ear: External ear normal.      Mouth/Throat:      Pharynx: No oropharyngeal exudate.   Eyes:      General: No scleral icterus.        Right eye: No discharge.         Left eye: No discharge.   Cardiovascular:      Rate and Rhythm: Normal rate.   Pulmonary:      Effort: Pulmonary effort is normal. No respiratory distress.   Abdominal:      General: There is no distension.   Musculoskeletal:         General: No swelling or deformity.      Cervical back: Normal range of motion and neck supple.      Right lower leg: No edema.      Left lower leg: No edema.   Skin:     Coloration: Skin is not jaundiced.      Findings: No bruising, erythema, lesion or rash.   Neurological:      General: No focal deficit present.      Mental Status: She is alert and oriented to person, place, and time. Mental status is at baseline.      Coordination: Coordination normal.      Gait: Gait normal.   Psychiatric:         Mood and Affect: Mood normal.         Behavior: Behavior normal.           Current Outpatient Medications   Medication Sig Dispense " Refill    allopurinoL (ZYLOPRIM) 100 MG tablet Take 0.5 tablets (50 mg total) by mouth once daily. 30 tablet 11    amLODIPine (NORVASC) 10 MG tablet Take 1 tablet (10 mg total) by mouth once daily. 90 tablet 3    amoxicillin-clavulanate 875-125mg (AUGMENTIN) 875-125 mg per tablet Take 1 tablet by mouth 2 (two) times daily. for 7 days 14 tablet 0    atorvastatin (LIPITOR) 80 MG tablet TAKE 1 TABLET BY MOUTH EVERY DAY 90 tablet 3    carvediloL (COREG) 12.5 MG tablet Take 1 tablet (12.5 mg total) by mouth 2 (two) times daily with meals. 180 tablet 3    colchicine (MITIGARE) 0.6 mg Cap Take 1 capsule (0.6 mg total) by mouth once daily. 30 capsule 11    fluticasone propionate (FLONASE) 50 mcg/actuation nasal spray SPRAY ONCE INTO EACH NOSTRIL ONCE DAILY 16 mL 3    hydroCHLOROthiazide (HYDRODIURIL) 25 MG tablet TAKE 1 TABLET BY MOUTH EVERY DAY 90 tablet 3    hydrocortisone 2.5 % ointment as needed.      ibuprofen (ADVIL,MOTRIN) 600 MG tablet Take 1 tablet (600 mg total) by mouth every 6 (six) hours as needed for Pain. 20 tablet 0    ketoconazole (NIZORAL) 2 % cream APPLY BETWEEN TOES TWICE DAILY X 2 WKS      LIDOcaine (LIDODERM) 5 % Place 1 patch onto the skin once daily. Remove & Discard patch within 12 hours or as directed by MD 15 patch 0    methylPREDNISolone (MEDROL DOSEPACK) 4 mg tablet use as directed 21 each 0    montelukast (SINGULAIR) 10 mg tablet Take 1 tablet (10 mg total) by mouth every evening. 90 tablet 6    pantoprazole (PROTONIX) 40 MG tablet TAKE 1 TABLET BY MOUTH EVERY DAY 90 tablet 0     No current facility-administered medications for this visit.     Facility-Administered Medications Ordered in Other Visits   Medication Dose Route Frequency Provider Last Rate Last Admin    sodium hyaluronate (EUFLEXXA) 10 mg/mL(mw 2.4 -3.6 million) injection 20 mg  20 mg Intra-articular  Armani Cai PA-C   20 mg at 11/14/22 0830       Pertinent Diagnostic studies:      No visits with results within 1 Week(s) from  this visit.   Latest known visit with results is:   Lab Visit on 03/28/2023   Component Date Value Ref Range Status    Uric Acid 03/28/2023 6.6 (H)  2.4 - 5.7 mg/dL Final    WBC 03/28/2023 7.46  3.90 - 12.70 K/uL Final    RBC 03/28/2023 3.22 (L)  4.00 - 5.40 M/uL Final    Hemoglobin 03/28/2023 8.9 (L)  12.0 - 16.0 g/dL Final    Hematocrit 03/28/2023 28.8 (L)  37.0 - 48.5 % Final    MCV 03/28/2023 89  82 - 98 fL Final    MCH 03/28/2023 27.6  27.0 - 31.0 pg Final    MCHC 03/28/2023 30.9 (L)  32.0 - 36.0 g/dL Final    RDW 03/28/2023 17.3 (H)  11.5 - 14.5 % Final    Platelets 03/28/2023 370  150 - 450 K/uL Final    MPV 03/28/2023 11.2  9.2 - 12.9 fL Final    Immature Granulocytes 03/28/2023 0.5  0.0 - 0.5 % Final    Gran # (ANC) 03/28/2023 4.1  1.8 - 7.7 K/uL Final    Immature Grans (Abs) 03/28/2023 0.04  0.00 - 0.04 K/uL Final    Comment: Mild elevation in immature granulocytes is non specific and   can be seen in a variety of conditions including stress response,   acute inflammation, trauma and pregnancy. Correlation with other   laboratory and clinical findings is essential.      Lymph # 03/28/2023 1.8  1.0 - 4.8 K/uL Final    Mono # 03/28/2023 1.3 (H)  0.3 - 1.0 K/uL Final    Eos # 03/28/2023 0.2  0.0 - 0.5 K/uL Final    Baso # 03/28/2023 0.07  0.00 - 0.20 K/uL Final    nRBC 03/28/2023 0  0 /100 WBC Final    Gran % 03/28/2023 55.0  38.0 - 73.0 % Final    Lymph % 03/28/2023 23.9  18.0 - 48.0 % Final    Mono % 03/28/2023 16.9 (H)  4.0 - 15.0 % Final    Eosinophil % 03/28/2023 2.8  0.0 - 8.0 % Final    Basophil % 03/28/2023 0.9  0.0 - 1.9 % Final    Differential Method 03/28/2023 Automated   Final           Oncology History    No history exists.     Assessment :       1. Other iron deficiency anemia    2. Iron deficiency anemia secondary to blood loss (chronic)    3. Stage 3a chronic kidney disease    4. Chronic maxillary sinusitis    5. Nutritional anemia        Plan :       Iron def anemia due to blood loss /nose  bleed  Intolerant to oral iron due to gi side effects.   injectafer 2 doses 1 week apart  RTC 3- 4 months with repeat labs.   Continue iron rich diet , d/w pt  Continue f/u with ENTfor chr maxillary sinusitis    Discussed side effects of Injectafer (or venofer) which include but are not limited to infusion reaction, shortness of breath, hives, rash, flushing, itching, headaches, skin discoloration at the injection site, nausea, vomiting, low phosphorus level, elevated blood pressure, elevated liver enzymes, risks to the unborn baby if pregnant. Patient understands the risks and agrees with proceeding with Injectafer (or venofer).     Route Chart for Scheduling  Med Onc Route Chart for Scheduling      Therapy Plan Information  Medications  ferric carboxymaltose (INJECTAFER) 750 mg in sodium chloride 0.9% 265 mL infusion  750 mg, Intravenous, 1 time a week  IV Fluids  0.9%  NaCl infusion  Intravenous, 1 time a week  Flushes  sodium chloride 0.9% flush 10 mL  10 mL, Intravenous, 1 time a week  heparin, porcine (PF) 100 unit/mL injection flush 500 Units  5 mL, Intravenous, 1 time a week  sodium chloride 0.9% 100 mL flush bag  Intravenous, 1 time a week  PRN Medications  EPINEPHrine (EPIPEN) 0.3 mg/0.3 mL pen injection 0.3 mg  0.3 mg, Intramuscular, PRN  diphenhydrAMINE injection 50 mg  50 mg, Intravenous, PRN  methylPREDNISolone sodium succinate injection 125 mg  125 mg, Intravenous, PRN  sodium chloride 0.9% bolus 1,000 mL 1,000 mL  1,000 mL, Intravenous, PRN        Electronically signed by Anh Martinez    [facility] Future Appointments   Date Time Provider Department Center   4/19/2023  9:00 AM Leila Hairston PT [facility] [place]   4/20/2023  8:00 AM Leila Hairston PT [facility] [place]   4/26/2023  9:00 AM Leila Hairston PT [facility] [place]   4/27/2023  7:00 AM Leila Hairston PT [facility] [place]   5/1/2023  8:00 AM Leila Hairston PT [facility] [place]   5/4/2023  8:00 AM Leila  Gavalas, PT [facility] [place]   5/10/2023  9:00 AM Leila Gavalas, PT [facility] [place]   5/11/2023  8:00 AM Leila Gavalas, PT [facility] [place]   5/15/2023  8:00 AM Leila Gavalas, PT [facility] [place]   5/17/2023  8:00 AM Leila Gavalas, PT [facility] [place]   5/22/2023  8:00 AM Leila Gavalas, PT [facility] [place]   5/23/2023  9:30 AM Juan Naranjo MD [id number] [place]   5/25/2023  8:00 AM Leila Gavalas, PT [facility] [place]   5/31/2023  8:00 AM Leila Gavalas, PT [facility] [place]   3/5/2024  9:00 AM Michael Lal MD [id number]     MDM includes  :      One or more chronic illness with exacerbation,  progression  2 or more stable chronic illnesses  1 or more chronic illness with severe exacerbation, progression or side effects of treatment    Independent review and explanation of 3+ results from unique tests  Discussion of management and ordering 3+ unique tests  Independent interpretation of test completed by another healthcare professional   Discussion of management or test interpretation with another healthcare professional     Prescription drug management  Drug therapy requiring intensive monitoring  for toxicity  Decision regarding hospitalizations        This note was created with voice recognition software.  Grammatical, syntax and spelling errors may be inevitable.

## 2023-04-19 ENCOUNTER — CLINICAL SUPPORT (OUTPATIENT)
Dept: REHABILITATION | Facility: OTHER | Age: 73
End: 2023-04-19
Payer: MEDICARE

## 2023-04-19 DIAGNOSIS — S46.011A TRAUMATIC COMPLETE TEAR OF RIGHT ROTATOR CUFF, INITIAL ENCOUNTER: ICD-10-CM

## 2023-04-19 DIAGNOSIS — M19.011 LOCALIZED PRIMARY OSTEOARTHRITIS OF RIGHT SHOULDER REGION: Primary | ICD-10-CM

## 2023-04-19 PROCEDURE — 97110 THERAPEUTIC EXERCISES: CPT | Mod: HCNC,PN

## 2023-04-19 NOTE — PROGRESS NOTES
[facility]   Physical Therapy Daily Treatment Note     Name: Jessica Floyd  Clinic Number: [id number]    Therapy Diagnosis:   Encounter Diagnoses   Name Primary?    Traumatic complete tear of right rotator cuff, initial encounter      Localized primary osteoarthritis of right shoulder region      Physician: Armani Cai PA-C    Visit Date: 4/19/2023    Physician Orders: Physical Therapy Evaluate and Treat  Medical Diagnosis from Referral: Traumatic complete tear of right rotator cuff, initial encounter (S46.011A); Localized primary osteoarthritis of right shoulder region (M19.011 )  Evaluation Date: 2/15/2023                 Authorization Period Expiration: 2/6/2024  Progress Note Due: 4/30/2023  Plan of Care Expiration: 4/6/2023 to 06/30/2023  Visit # / Visits authorized: 13/40 (+eval)   FOTO: 4/6/2023(3/5)  PTA Visit #: 0/5    Precautions: Standard    Time In: 9:00 am    Time Out: 10:15 am    Total Billable Time: 55 minutes      SUBJECTIVE   Pt reports: She is feeling much better compared to last time.     She was not compliant with home exercise program.  Response to previous treatment: felt good, decreased pain  Functional change: None today    Pain: 3/10 sitting still  Location: R shoulder     OBJECTIVE     Objective measures updated at progress report unless otherwise noted.      TREATMENT     Jessica received the bolded treatments listed below:      Patient received therapeutic exercises for 55 minutes for improved strength and AROM including:  Pulleys flexion x3 minutes  Seated Rows with RTB 3x 10  Seated extension with RTB 3x 10  Seated IR with towel roll to neutral 3x 10  Seated ER with towel roll to neutral 3x 10  Seated thoracic extension with 1/2 foam 2x 15 with 5 second holds   Shoulder isometrics flex/INTERNAL ROTATION/ER/ABD 10x 10 second hold   AAROM into flexion with dowel x10 - stopped due to pain  Serratus punches x15 with dowel          Palloff press in sitting with  "double red theratube x10 bilateral   Ball press into mat with yellow physioball 3 second holds x20; circles CW/CCW x30 each  Shoulder pendulum circles x20 ea                   Table slides flx/abd x20 ea  Table circles x20   Wall slides x20  Ball press IR/ER iso with pilates ring 3" hold x20    Shoulder pendulum hang 2'    Patient received manual therapeutic technique for 00 minutes for improved soft tissue and joint mobility including:  Shoulder PROM flexion/ABD/ER  A/P and inferior GHJ mobilizations grade I-II with PROM  Oscillations with PROM for relaxation  STM to global shoulder    PROM R shoulder  Grade 1-2 joint glides in all directions for decreased muscle guarding and pain   STM to R shoulder    Patient received neuromuscular reeducation for 00 minutes for improved proprioception and balance including:        Patient received therapeutic activities for 00 minutes for improved tolerance to functional activities including:      Jessica received hot pack for 10 minutes to increase circulation and promote tissue healing prior to exercises.    Jessica received cold pack for 10 minutes to to decrease circulation, pain, and swelling after exercises.      PATIENT EDUCATION AND HOME EXERCISES     Home Exercises Provided and Patient Education Provided     Education provided:   PT educated pt on importance of compliance with their HEP this visit.     Written Home Exercises Provided: Patient instructed to cont prior HEP. Exercises were reviewed and Jessica was able to demonstrate them prior to the end of the session.  Jessica demonstrated fair  understanding of the education provided. See EMR under Patient Instructions for exercises provided during therapy sessions    ASSESSMENT      Patient presents to therapy feeling better compared to last visit although by the end of the session has significant difficulty reaching up overhead independently and with dowel for AAROM.   Continues to have limitations into flexion/ER/ABD " range of motion and strength.  Progress strengthening and RANGE OF MOTION as tolerated and consider performing AAROM on mat prior to pulleys for assessment.      Jessica Is not progressing well towards her goals.   Pt prognosis is Fair.     Pt will continue to benefit from skilled outpatient physical therapy to address the deficits listed in the problem list box on initial evaluation, provide pt/family education and to maximize pt's level of independence in the home and community environment.     Pt's spiritual, cultural and educational needs considered and pt agreeable to plan of care and goals.     Anticipated barriers to physical therapy: None    Goals:    Short Term Goals:  4 weeks  1.Report decreased R shoulder pain pain < / =  6/10  to increase tolerance for reaching MET  2. Increase PROM to 120 degrees R shoulder flexion MET  3. Increased strength by 1/3 MMT grade in R shoulder abduction to increase tolerance for ADL and work activities. (progressing, not met)  4. Pt to tolerate HEP to improve ROM and independence with ACTIVITIES OF DAILY LIVING's MET     Long Term Goals: 8 weeks  1.Report decreased R shoulder pain  < / =  3 /10  to increase tolerance for lifting (progressing, not met)  2.Increase AROM to 150 deg R shoulder flexion (progressing, not met)  3.Increase strength to >/= 4/5 in R shoulder abduction to increase tolerance for ADL and work activities. (progressing, not met)  4. Pt goal: perform ADLs without shoulder pain. (progressing, not met)  5. Pt will have improved gcode of CJ (20-40% limited) on FOTO shoulder in order to demonstrate true functional improvement. (progressing, not met)    PLAN     Improve R shoulder ROM and strength     Updated Certification Period: 4/6/2023 to 06/30/2023  Recommended Treatment Plan: 2 times per week for 12 weeks: Manual Therapy, Moist Heat/ Ice, Neuromuscular Re-ed, Patient Education, Therapeutic Activities, and Therapeutic Exercise      Leila Hairston, PT

## 2023-04-20 ENCOUNTER — PATIENT MESSAGE (OUTPATIENT)
Dept: PRIMARY CARE CLINIC | Facility: CLINIC | Age: 73
End: 2023-04-20
Payer: MEDICARE

## 2023-04-20 ENCOUNTER — CLINICAL SUPPORT (OUTPATIENT)
Dept: REHABILITATION | Facility: OTHER | Age: 73
End: 2023-04-20
Payer: MEDICARE

## 2023-04-20 DIAGNOSIS — M19.011 LOCALIZED PRIMARY OSTEOARTHRITIS OF RIGHT SHOULDER REGION: Primary | ICD-10-CM

## 2023-04-20 DIAGNOSIS — S46.011A TRAUMATIC COMPLETE TEAR OF RIGHT ROTATOR CUFF, INITIAL ENCOUNTER: ICD-10-CM

## 2023-04-20 NOTE — PROGRESS NOTES
[facility]   Physical Therapy Daily Treatment Note     Name: Jessica Floyd  Clinic Number: [id number]    Therapy Diagnosis:   Encounter Diagnoses   Name Primary?    Traumatic complete tear of right rotator cuff, initial encounter      Localized primary osteoarthritis of right shoulder region      Physician: Armani Cai PA-C    Visit Date: 4/20/2023    Physician Orders: Physical Therapy Evaluate and Treat  Medical Diagnosis from Referral: Traumatic complete tear of right rotator cuff, initial encounter (S46.011A); Localized primary osteoarthritis of right shoulder region (M19.011 )  Evaluation Date: 2/15/2023                 Authorization Period Expiration: 2/6/2024  Progress Note Due: 4/30/2023  Plan of Care Expiration: 4/6/2023 to 06/30/2023  Visit # / Visits authorized: 14/40 (+eval)   FOTO: 4/6/2023(3/5)  PTA Visit #: 0/5    Precautions: Standard    Time In: 8:18 am (late arrival)     Time Out: 9:00 am     Total Billable Time: 5 minutes      SUBJECTIVE   Pt reports: she had a rough night last night and she almost called and cancelled.  She hasn't done too much today but notes that getting dressed is easier  for her which she is happy about.       She was not compliant with home exercise program.  Response to previous treatment: felt good, decreased pain  Functional change: None today    Pain: 1/10 sitting still  Location: R shoulder     OBJECTIVE     Objective measures updated at progress report unless otherwise noted.      TREATMENT     Jessica received the bolded treatments listed below:      Patient received therapeutic exercises for 5 minutes for improved strength and AROM including:  Supine with HOB elevated AAROM with dowel 3x 10   Pulleys flexion x3 minutes  Seated Rows with RTB 3x 10  Seated extension with RTB 3x 10  Seated IR with towel roll to neutral with RTB 3x 10  Seated ER with towel roll to neutral with RTB 3x 10  Ball press into mat with yellow physioball 3 second  "holds x20; circles CW/CCW x30 each               Table slides flx/abd x20 ea      Not performed today:  Seated thoracic extension with 1/2 foam 2x 15 with 5 second holds   Shoulder isometrics flex/INTERNAL ROTATION/ER/ABD 10x 10 second hold   AAROM into flexion with dowel x10 - stopped due to pain  Serratus punches x15 with dowel   Palloff press in sitting with double red theratube x10 bilateral   Shoulder pendulum circles x20 ea     Table circles x20   Wall slides x20  Ball press IR/ER iso with pilates ring 3" hold x20    Shoulder pendulum hang 2'    Patient received manual therapeutic technique for 00 minutes for improved soft tissue and joint mobility including:  Shoulder PROM flexion/ABD/ER  A/P and inferior GHJ mobilizations grade I-II with PROM  Oscillations with PROM for relaxation  STM to global shoulder    PROM R shoulder  Grade 1-2 joint glides in all directions for decreased muscle guarding and pain   STM to R shoulder    Patient received neuromuscular reeducation for 00 minutes for improved proprioception and balance including:        Patient received therapeutic activities for 00 minutes for improved tolerance to functional activities including:      Jessica received hot pack for 10 minutes to increase circulation and promote tissue healing prior to exercises.    Jessica received cold pack for 10 minutes to to decrease circulation, pain, and swelling after exercises.      PATIENT EDUCATION AND HOME EXERCISES     Home Exercises Provided and Patient Education Provided     Education provided:   PT educated pt on importance of compliance with their HEP this visit.     Written Home Exercises Provided: Patient instructed to cont prior HEP. Exercises were reviewed and Jessica was able to demonstrate them prior to the end of the session.  Jessica demonstrated fair  understanding of the education provided. See EMR under Patient Instructions for exercises provided during therapy sessions    ASSESSMENT      Limited " treatment session due to late arrival and pt request for heat and ice pre and post treatment.  Continues to have pain with shoulder AROM throughout all ranges.  Most notable pain from initiation <> midrange flexion.  No pain from mid <>end ranges but limited RANGE OF MOTION.  Plan to continue with activities as tolerated to improve RANGE OF MOTION and functional use of right UPPER EXTREMITY for dressing, bathing, cooking, and recreational activities.       Jessica Is slowly progressing well towards her goals.   Pt prognosis is Fair.     Pt will continue to benefit from skilled outpatient physical therapy to address the deficits listed in the problem list box on initial evaluation, provide pt/family education and to maximize pt's level of independence in the home and community environment.     Pt's spiritual, cultural and educational needs considered and pt agreeable to plan of care and goals.     Anticipated barriers to physical therapy: None    Goals:    Short Term Goals:  4 weeks  1.Report decreased R shoulder pain pain < / =  6/10  to increase tolerance for reaching MET  2. Increase PROM to 120 degrees R shoulder flexion MET  3. Increased strength by 1/3 MMT grade in R shoulder abduction to increase tolerance for ADL and work activities. (progressing, not met)  4. Pt to tolerate HEP to improve ROM and independence with ACTIVITIES OF DAILY LIVING's MET     Long Term Goals: 8 weeks  1.Report decreased R shoulder pain  < / =  3 /10  to increase tolerance for lifting (progressing, not met)  2.Increase AROM to 150 deg R shoulder flexion (progressing, not met)  3.Increase strength to >/= 4/5 in R shoulder abduction to increase tolerance for ADL and work activities. (progressing, not met)  4. Pt goal: perform ADLs without shoulder pain. (progressing, not met)  5. Pt will have improved gcode of CJ (20-40% limited) on FOTO shoulder in order to demonstrate true functional improvement. (progressing, not met)    PLAN      Improve R shoulder ROM and strength     Updated Certification Period: 4/6/2023 to 06/30/2023  Recommended Treatment Plan: 2 times per week for 12 weeks: Manual Therapy, Moist Heat/ Ice, Neuromuscular Re-ed, Patient Education, Therapeutic Activities, and Therapeutic Exercise      Leila Hairston, PT

## 2023-04-22 ENCOUNTER — PATIENT MESSAGE (OUTPATIENT)
Dept: INTERNAL MEDICINE | Facility: CLINIC | Age: 73
End: 2023-04-22
Payer: MEDICARE

## 2023-04-24 ENCOUNTER — TELEPHONE (OUTPATIENT)
Dept: SPORTS MEDICINE | Facility: CLINIC | Age: 73
End: 2023-04-24
Payer: MEDICARE

## 2023-04-24 ENCOUNTER — PATIENT MESSAGE (OUTPATIENT)
Dept: INTERNAL MEDICINE | Facility: CLINIC | Age: 73
End: 2023-04-24
Payer: MEDICARE

## 2023-04-24 ENCOUNTER — PATIENT MESSAGE (OUTPATIENT)
Dept: SPORTS MEDICINE | Facility: CLINIC | Age: 73
End: 2023-04-24
Payer: MEDICARE

## 2023-04-24 NOTE — TELEPHONE ENCOUNTER
Spoke to the Pt about her shoulder pain.  Informed her that we need  to see her with a surgeon for the continued pain.  She confirmed and we made that appt.

## 2023-04-26 ENCOUNTER — TELEPHONE (OUTPATIENT)
Dept: SPORTS MEDICINE | Facility: CLINIC | Age: 73
End: 2023-04-26
Payer: MEDICARE

## 2023-04-26 ENCOUNTER — PATIENT MESSAGE (OUTPATIENT)
Dept: SPORTS MEDICINE | Facility: CLINIC | Age: 73
End: 2023-04-26
Payer: MEDICARE

## 2023-04-26 ENCOUNTER — CLINICAL SUPPORT (OUTPATIENT)
Dept: REHABILITATION | Facility: OTHER | Age: 73
End: 2023-04-26
Payer: MEDICARE

## 2023-04-26 DIAGNOSIS — S46.011A TRAUMATIC COMPLETE TEAR OF RIGHT ROTATOR CUFF, INITIAL ENCOUNTER: ICD-10-CM

## 2023-04-26 DIAGNOSIS — M19.011 LOCALIZED PRIMARY OSTEOARTHRITIS OF RIGHT SHOULDER REGION: Primary | ICD-10-CM

## 2023-04-26 PROCEDURE — 97110 THERAPEUTIC EXERCISES: CPT | Mod: HCNC,PN

## 2023-04-26 NOTE — PROGRESS NOTES
[facility]   Physical Therapy Daily Treatment Note     Name: Jessica Floyd  Clinic Number: [id number]    Therapy Diagnosis:   Encounter Diagnoses   Name Primary?    Traumatic complete tear of right rotator cuff, initial encounter      Localized primary osteoarthritis of right shoulder region      Physician: Armani Cai PA-C    Visit Date: 4/26/2023    Physician Orders: Physical Therapy Evaluate and Treat  Medical Diagnosis from Referral: Traumatic complete tear of right rotator cuff, initial encounter (S46.011A); Localized primary osteoarthritis of right shoulder region (M19.011 )  Evaluation Date: 2/15/2023                 Authorization Period Expiration: 2/6/2024  Progress Note Due: 4/30/2023  Plan of Care Expiration: 4/6/2023 to 06/30/2023  Visit # / Visits authorized: 15/40 (+eval)   FOTO: 4/6/2023(3/5)  PTA Visit #: 0/5    Precautions: Standard    Time In: 9:00 am   Time Out: 10:05 am     Total Billable Time: 45 minutes      SUBJECTIVE   Pt reports: she is really hurting today.  The shoulder feels like it is getting worse now and she is very frustrated.  She tried contacting the doctor to get in and have additional imaging and potentially discuss surgery but was unable to get in.  She is trying to work out with her daughters schedule to make an appointment with an orthopedic surgeon for the shoulder.  She is having significant difficulty with daily activities due to pain.  For instance, the other day she was cutting open a bag of pretzels for her granddaughter and the pain was so bad her knees gave out and she fell, but her daughter caught her before she hit the ground.  The pain is starting to really affect her activities.  She was hopeful since RANGE OF MOTION was improving but now the pain is worse than before, even with the improved ranges.      She was not compliant with home exercise program.  Response to previous treatment: felt good, decreased pain  Functional change:  "None today    Pain: 1/10 sitting still (12/10 with certain movements   Location: R shoulder     OBJECTIVE     Objective measures updated at progress report unless otherwise noted.      TREATMENT     Jessica received the bolded treatments listed below:      Patient received therapeutic exercises for 40 minutes for improved strength and AROM including:  Pulleys flexion x3 minutes  Bicep curl with YTB x30 on right   Tricep extension with YTB x30 on right  Shoulder isometrics flex/INTERNAL ROTATION/ER/ABD 10x 10 second hold   PROM flex/scaption/abd/ER/IR          Not performed today:  Seated thoracic extension with 1/2 foam 2x 15 with 5 second holds   Supine with HOB elevated AAROM with dowel 3x 10   Seated Rows with RTB 3x 10  Seated extension with RTB 3x 10  Seated IR with towel roll to neutral with RTB 3x 10  Seated ER with towel roll to neutral with RTB 3x 10  Ball press into mat with yellow physioball 3 second holds x20; circles CW/CCW x30 each               Table slides flx/abd x20 ea  AAROM into flexion with dowel x10 - stopped due to pain  Serratus punches x15 with dowel   Palloff press in sitting with double red theratube x10 bilateral   Shoulder pendulum circles x20 ea     Table circles x20   Wall slides x20  Ball press IR/ER iso with pilates ring 3" hold x20    Shoulder pendulum hang 2'    Patient received manual therapeutic technique for 00 minutes for improved soft tissue and joint mobility including:  Shoulder PROM flexion/ABD/ER  A/P and inferior GHJ mobilizations grade I-II with PROM  Oscillations with PROM for relaxation  STM to global shoulder    PROM R shoulder  Grade 1-2 joint glides in all directions for decreased muscle guarding and pain   STM to R shoulder    Patient received neuromuscular reeducation for 5 minutes for improved proprioception and balance including:  HOB elevated ER/IR perturbations through ranges with YTB and ice      Patient received therapeutic activities for 00 minutes for " improved tolerance to functional activities including:      Jessica received hot pack for 10 minutes to increase circulation and promote tissue healing prior to exercises.    Jessica received cold pack for 10 minutes to to decrease circulation, pain, and swelling after exercises.      PATIENT EDUCATION AND HOME EXERCISES     Home Exercises Provided and Patient Education Provided     Education provided:   PT educated pt on importance of compliance with their HEP this visit.     Written Home Exercises Provided: Patient instructed to cont prior HEP. Exercises were reviewed and Jessica was able to demonstrate them prior to the end of the session.  Jessica demonstrated fair  understanding of the education provided. See EMR under Patient Instructions for exercises provided during therapy sessions    ASSESSMENT      Patient had significant difficulty with pulleys today so held off with majority of exercises today.  Increased pain reported with PROM, AAROM and AROM.  Discussed returning to physician for additional treatment options and expectations regarding therapy and pain due to underlying pathology of ROTATOR CUFF/biceps tear causing pain.  Limited treatment due to increased pain levels.  Continue with exercises as tolerated.       Jessica Is slowly progressing well towards her goals.   Pt prognosis is Fair.     Pt will continue to benefit from skilled outpatient physical therapy to address the deficits listed in the problem list box on initial evaluation, provide pt/family education and to maximize pt's level of independence in the home and community environment.     Pt's spiritual, cultural and educational needs considered and pt agreeable to plan of care and goals.     Anticipated barriers to physical therapy: None    Goals:    Short Term Goals:  4 weeks  1.Report decreased R shoulder pain pain < / =  6/10  to increase tolerance for reaching MET  2. Increase PROM to 120 degrees R shoulder flexion MET  3. Increased  strength by 1/3 MMT grade in R shoulder abduction to increase tolerance for ADL and work activities. (progressing, not met)  4. Pt to tolerate HEP to improve ROM and independence with ACTIVITIES OF DAILY LIVING's MET     Long Term Goals: 8 weeks  1.Report decreased R shoulder pain  < / =  3 /10  to increase tolerance for lifting (progressing, not met)  2.Increase AROM to 150 deg R shoulder flexion (progressing, not met)  3.Increase strength to >/= 4/5 in R shoulder abduction to increase tolerance for ADL and work activities. (progressing, not met)  4. Pt goal: perform ADLs without shoulder pain. (progressing, not met)  5. Pt will have improved gcode of CJ (20-40% limited) on FOTO shoulder in order to demonstrate true functional improvement. (progressing, not met)    PLAN     Improve R shoulder ROM and strength     Updated Certification Period: 4/6/2023 to 06/30/2023  Recommended Treatment Plan: 2 times per week for 12 weeks: Manual Therapy, Moist Heat/ Ice, Neuromuscular Re-ed, Patient Education, Therapeutic Activities, and Therapeutic Exercise      Leila Hairston, PT

## 2023-04-27 ENCOUNTER — CLINICAL SUPPORT (OUTPATIENT)
Dept: REHABILITATION | Facility: OTHER | Age: 73
End: 2023-04-27
Payer: MEDICARE

## 2023-04-27 DIAGNOSIS — S46.011A TRAUMATIC COMPLETE TEAR OF RIGHT ROTATOR CUFF, INITIAL ENCOUNTER: ICD-10-CM

## 2023-04-27 DIAGNOSIS — M19.011 LOCALIZED PRIMARY OSTEOARTHRITIS OF RIGHT SHOULDER REGION: Primary | ICD-10-CM

## 2023-04-27 PROCEDURE — 97112 NEUROMUSCULAR REEDUCATION: CPT | Mod: HCNC,PN

## 2023-04-27 PROCEDURE — 97110 THERAPEUTIC EXERCISES: CPT | Mod: HCNC,PN

## 2023-04-27 NOTE — PROGRESS NOTES
[facility]   Physical Therapy Daily Treatment Note     Name: Jessica Floyd  Clinic Number: [id number]    Therapy Diagnosis:   Encounter Diagnoses   Name Primary?    Traumatic complete tear of right rotator cuff, initial encounter      Localized primary osteoarthritis of right shoulder region      Physician: Armani Cai PA-C    Visit Date: 4/27/2023    Physician Orders: Physical Therapy Evaluate and Treat  Medical Diagnosis from Referral: Traumatic complete tear of right rotator cuff, initial encounter (S46.011A); Localized primary osteoarthritis of right shoulder region (M19.011 )  Evaluation Date: 2/15/2023                 Authorization Period Expiration: 2/6/2024  Progress Note Due: 4/30/2023  Plan of Care Expiration: 4/6/2023 to 06/30/2023  Visit # / Visits authorized: 16/40 (+eval)   FOTO: 4/6/2023(3/5)  PTA Visit #: 0/5    Precautions: Standard    Time In: 10:15 am (late arrival)    Time Out: 11:10 am   Total Billable Time: 25 minutes      SUBJECTIVE   Pt reports: she felt better after last time and was able to go home and cook as well as use the pulleys at night.  When she can use the pulleys at the end of the day, she knows it was a good day.  She talked with the surgeon and has an appointment in 2 weeks regarding further treatment options as the pain remains increased since starting therapy.       She was not compliant with home exercise program.  Response to previous treatment: felt good, decreased pain  Functional change: None today    Pain: 1/10 sitting still (12/10 with certain movements   Location: R shoulder     OBJECTIVE     Objective measures updated at progress report unless otherwise noted.      TREATMENT     Jessica received the bolded treatments listed below:      Patient received therapeutic exercises for 30 minutes for improved strength and AROM including:  Pulleys flexion x3 minutes  Bicep curl with YTB x30 on right   Tricep extension with YTB x30 on  "right  Standing table slides with ball flexion/scaption x3 minutes each       Not performed today:  Shoulder isometrics flex/INTERNAL ROTATION/ER/ABD 10x 10 second hold   PROM flex/scaption/abd/ER/IR  Seated thoracic extension with 1/2 foam 2x 15 with 5 second holds   Supine with HOB elevated AAROM with dowel 3x 10   Seated Rows with RTB 3x 10  Seated extension with RTB 3x 10  Seated IR with towel roll to neutral with RTB 3x 10  Seated ER with towel roll to neutral with RTB 3x 10  Table slides flx/abd x20 ea  AAROM into flexion with dowel x10 - stopped due to pain  Serratus punches x15 with dowel   Palloff press in sitting with double red theratube x10 bilateral   Shoulder pendulum circles x20 ea     Table circles x20   Wall slides x20  Ball press IR/ER iso with pilates ring 3" hold x20    Shoulder pendulum hang 2'    Patient received manual therapeutic technique for 00 minutes for improved soft tissue and joint mobility including:  Shoulder PROM flexion/ABD/ER  A/P and inferior GHJ mobilizations grade I-II with PROM  Oscillations with PROM for relaxation  STM to global shoulder    PROM R shoulder  Grade 1-2 joint glides in all directions for decreased muscle guarding and pain   STM to R shoulder    Patient received neuromuscular reeducation for 20 minutes for improved proprioception including:  HOB elevated ER/IR perturbations through ranges with YTB and ice x5 minutes  Ball press into mat with yellow physioball flex/ABD 3 second holds x3 minutes; circles CW/CCW x3 mins each         Patient received therapeutic activities for 00 minutes for improved tolerance to functional activities including:      Jessica received hot pack for 10 minutes to increase circulation and promote tissue healing prior to exercises.    Jessica received cold pack for 10 minutes to to decrease circulation, pain, and swelling after exercises.      PATIENT EDUCATION AND HOME EXERCISES     Home Exercises Provided and Patient Education Provided "     Education provided:   PT educated pt on importance of compliance with their HEP this visit.     Written Home Exercises Provided: Patient instructed to cont prior HEP. Exercises were reviewed and Jessica was able to demonstrate them prior to the end of the session.  Jessica demonstrated fair  understanding of the education provided. See EMR under Patient Instructions for exercises provided during therapy sessions    ASSESSMENT      Patient tolerated treatment much better today compared to yesterday with gentle isometric and RANGE OF MOTION exercises performed for the shoulder.  Continued with bicep/tricep work without increase in symptoms.  Plan to progress as tolerated with RANGE OF MOTION and strengthening.      Jessiac Is slowly progressing well towards her goals.   Pt prognosis is Fair.     Pt will continue to benefit from skilled outpatient physical therapy to address the deficits listed in the problem list box on initial evaluation, provide pt/family education and to maximize pt's level of independence in the home and community environment.     Pt's spiritual, cultural and educational needs considered and pt agreeable to plan of care and goals.     Anticipated barriers to physical therapy: None    Goals:    Short Term Goals:  4 weeks  1.Report decreased R shoulder pain pain < / =  6/10  to increase tolerance for reaching MET  2. Increase PROM to 120 degrees R shoulder flexion MET  3. Increased strength by 1/3 MMT grade in R shoulder abduction to increase tolerance for ADL and work activities. (progressing, not met)  4. Pt to tolerate HEP to improve ROM and independence with ACTIVITIES OF DAILY LIVING's MET     Long Term Goals: 8 weeks  1.Report decreased R shoulder pain  < / =  3 /10  to increase tolerance for lifting (progressing, not met)  2.Increase AROM to 150 deg R shoulder flexion (progressing, not met)  3.Increase strength to >/= 4/5 in R shoulder abduction to increase tolerance for ADL and work  activities. (progressing, not met)  4. Pt goal: perform ADLs without shoulder pain. (progressing, not met)  5. Pt will have improved gcode of CJ (20-40% limited) on FOTO shoulder in order to demonstrate true functional improvement. (progressing, not met)    PLAN     Improve R shoulder ROM and strength     Updated Certification Period: 4/6/2023 to 06/30/2023  Recommended Treatment Plan: 2 times per week for 12 weeks: Manual Therapy, Moist Heat/ Ice, Neuromuscular Re-ed, Patient Education, Therapeutic Activities, and Therapeutic Exercise      Leila Hairston, PT

## 2023-05-01 ENCOUNTER — CLINICAL SUPPORT (OUTPATIENT)
Dept: REHABILITATION | Facility: OTHER | Age: 73
End: 2023-05-01
Payer: MEDICARE

## 2023-05-01 ENCOUNTER — OFFICE VISIT (OUTPATIENT)
Dept: SPORTS MEDICINE | Facility: CLINIC | Age: 73
End: 2023-05-01
Payer: MEDICARE

## 2023-05-01 VITALS
HEART RATE: 82 BPM | SYSTOLIC BLOOD PRESSURE: 156 MMHG | HEIGHT: 63 IN | BODY MASS INDEX: 23.21 KG/M2 | WEIGHT: 131 LBS | DIASTOLIC BLOOD PRESSURE: 87 MMHG

## 2023-05-01 DIAGNOSIS — M19.011 LOCALIZED PRIMARY OSTEOARTHRITIS OF RIGHT SHOULDER REGION: Primary | ICD-10-CM

## 2023-05-01 DIAGNOSIS — M19.011 LOCALIZED PRIMARY OSTEOARTHRITIS OF RIGHT SHOULDER REGION: ICD-10-CM

## 2023-05-01 DIAGNOSIS — S46.011A TRAUMATIC COMPLETE TEAR OF RIGHT ROTATOR CUFF, INITIAL ENCOUNTER: ICD-10-CM

## 2023-05-01 DIAGNOSIS — S46.011A TRAUMATIC COMPLETE TEAR OF RIGHT ROTATOR CUFF, INITIAL ENCOUNTER: Primary | ICD-10-CM

## 2023-05-01 PROCEDURE — 99214 PR OFFICE/OUTPT VISIT, EST, LEVL IV, 30-39 MIN: ICD-10-PCS | Mod: 25,S$GLB,, | Performed by: ORTHOPAEDIC SURGERY

## 2023-05-01 PROCEDURE — 3288F PR FALLS RISK ASSESSMENT DOCUMENTED: ICD-10-PCS | Mod: CPTII,S$GLB,, | Performed by: ORTHOPAEDIC SURGERY

## 2023-05-01 PROCEDURE — 1125F PR PAIN SEVERITY QUANTIFIED, PAIN PRESENT: ICD-10-PCS | Mod: CPTII,S$GLB,, | Performed by: ORTHOPAEDIC SURGERY

## 2023-05-01 PROCEDURE — 97112 NEUROMUSCULAR REEDUCATION: CPT | Mod: PN

## 2023-05-01 PROCEDURE — 3079F DIAST BP 80-89 MM HG: CPT | Mod: CPTII,S$GLB,, | Performed by: ORTHOPAEDIC SURGERY

## 2023-05-01 PROCEDURE — 3008F BODY MASS INDEX DOCD: CPT | Mod: CPTII,S$GLB,, | Performed by: ORTHOPAEDIC SURGERY

## 2023-05-01 PROCEDURE — 3077F PR MOST RECENT SYSTOLIC BLOOD PRESSURE >= 140 MM HG: ICD-10-PCS | Mod: CPTII,S$GLB,, | Performed by: ORTHOPAEDIC SURGERY

## 2023-05-01 PROCEDURE — 20611 DRAIN/INJ JOINT/BURSA W/US: CPT | Mod: RT,S$GLB,, | Performed by: ORTHOPAEDIC SURGERY

## 2023-05-01 PROCEDURE — 1101F PT FALLS ASSESS-DOCD LE1/YR: CPT | Mod: CPTII,S$GLB,, | Performed by: ORTHOPAEDIC SURGERY

## 2023-05-01 PROCEDURE — 20611 LARGE JOINT ASPIRATION/INJECTION: R SUBACROMIAL BURSA: ICD-10-PCS | Mod: RT,S$GLB,, | Performed by: ORTHOPAEDIC SURGERY

## 2023-05-01 PROCEDURE — 3288F FALL RISK ASSESSMENT DOCD: CPT | Mod: CPTII,S$GLB,, | Performed by: ORTHOPAEDIC SURGERY

## 2023-05-01 PROCEDURE — 3008F PR BODY MASS INDEX (BMI) DOCUMENTED: ICD-10-PCS | Mod: CPTII,S$GLB,, | Performed by: ORTHOPAEDIC SURGERY

## 2023-05-01 PROCEDURE — 99999 PR PBB SHADOW E&M-EST. PATIENT-LVL III: CPT | Mod: PBBFAC,,, | Performed by: ORTHOPAEDIC SURGERY

## 2023-05-01 PROCEDURE — 99999 PR PBB SHADOW E&M-EST. PATIENT-LVL III: ICD-10-PCS | Mod: PBBFAC,,, | Performed by: ORTHOPAEDIC SURGERY

## 2023-05-01 PROCEDURE — 97110 THERAPEUTIC EXERCISES: CPT | Mod: PN

## 2023-05-01 PROCEDURE — 1125F AMNT PAIN NOTED PAIN PRSNT: CPT | Mod: CPTII,S$GLB,, | Performed by: ORTHOPAEDIC SURGERY

## 2023-05-01 PROCEDURE — 1159F MED LIST DOCD IN RCRD: CPT | Mod: CPTII,S$GLB,, | Performed by: ORTHOPAEDIC SURGERY

## 2023-05-01 PROCEDURE — 1159F PR MEDICATION LIST DOCUMENTED IN MEDICAL RECORD: ICD-10-PCS | Mod: CPTII,S$GLB,, | Performed by: ORTHOPAEDIC SURGERY

## 2023-05-01 PROCEDURE — 3077F SYST BP >= 140 MM HG: CPT | Mod: CPTII,S$GLB,, | Performed by: ORTHOPAEDIC SURGERY

## 2023-05-01 PROCEDURE — 99214 OFFICE O/P EST MOD 30 MIN: CPT | Mod: 25,S$GLB,, | Performed by: ORTHOPAEDIC SURGERY

## 2023-05-01 PROCEDURE — 1101F PR PT FALLS ASSESS DOC 0-1 FALLS W/OUT INJ PAST YR: ICD-10-PCS | Mod: CPTII,S$GLB,, | Performed by: ORTHOPAEDIC SURGERY

## 2023-05-01 PROCEDURE — 3079F PR MOST RECENT DIASTOLIC BLOOD PRESSURE 80-89 MM HG: ICD-10-PCS | Mod: CPTII,S$GLB,, | Performed by: ORTHOPAEDIC SURGERY

## 2023-05-01 RX ORDER — METHYLPREDNISOLONE ACETATE 40 MG/ML
40 INJECTION, SUSPENSION INTRA-ARTICULAR; INTRALESIONAL; INTRAMUSCULAR; SOFT TISSUE
Status: DISCONTINUED | OUTPATIENT
Start: 2023-05-01 | End: 2023-05-01 | Stop reason: HOSPADM

## 2023-05-01 RX ADMIN — METHYLPREDNISOLONE ACETATE 40 MG: 40 INJECTION, SUSPENSION INTRA-ARTICULAR; INTRALESIONAL; INTRAMUSCULAR; SOFT TISSUE at 10:05

## 2023-05-01 NOTE — PROGRESS NOTES
NEW PATIENT    HISTORY OF PRESENT ILLNESS   72 y.o. Female with a history of right shoulder pain who is referred today by Armani Cai PA-C. She is retired and she states she has been in physical therapy for a while for her knee and her shoulder. That has taken up most of her time. She has had a fall. She also had a incident a few months ago where she lifted herself up from a low toilet and she felt a pop in the shoulder. Armani gave her a CSI and she had some relief.         Is affecting ADLs.  Pain is 8/10 at it's worst.        PAST MEDICAL HISTORY    Past Medical History:   Diagnosis Date    Allergic rhinitis     Amblyopia     Carotid stenosis     Coronary atherosclerosis     Outside [facility] 2014: 20% mLAD, 50% mCx, 20%, mRCA    Dyslipidemia     ESBL (extended spectrum beta-lactamase) producing bacteria infection     UTI    Hypertension     Nausea and vomiting 2017    Subarachnoid hemorrhage due to ruptured aneurysm        PAST SURGICAL HISTORY     Past Surgical History:   Procedure Laterality Date    ADENOIDECTOMY      ANTERIOR CRUCIATE LIGAMENT REPAIR      APPENDECTOMY      CATARACT EXTRACTION W/  INTRAOCULAR LENS IMPLANT Right 2022    Procedure: EXTRACTION, CATARACT, WITH IOL INSERTION;  Surgeon: Higinio Cristina MD;  Location: [facility] OR;  Service: Ophthalmology;  Laterality: Right;    CATARACT EXTRACTION W/  INTRAOCULAR LENS IMPLANT Left 2022    Procedure: EXTRACTION, CATARACT, WITH IOL INSERTION;  Surgeon: Higinio Cristina MD;  Location: [facility];  Service: Ophthalmology;  Laterality: Left;    CEREBRAL ANEURYSM REPAIR      clip     SECTION      DENTAL SURGERY      EYE SURGERY Bilateral     cataract removal and lens implants    TONSILLECTOMY         FAMILY HISTORY    Family History   Problem Relation Age of Onset    Hypertension Mother     Aneurysm Mother     Hypertension Father     Alcohol abuse Father     Kidney disease Brother     Heart disease Brother     Gout Brother     No  Known Problems Daughter     No Known Problems Daughter     No Known Problems Daughter        SOCIAL HISTORY    Social History     Socioeconomic History    Marital status:    Occupational History    Occupation: Retired   Tobacco Use    Smoking status: Former     Packs/day: 0.50     Years: 20.00     Pack years: 10.00     Types: Cigarettes     Quit date: 1999     Years since quittin.3     Passive exposure: Past    Smokeless tobacco: Never   Substance and Sexual Activity    Alcohol use: Yes     Alcohol/week: 7.0 standard drinks     Types: 7 Glasses of wine per week    Drug use: No    Sexual activity: Yes     Partners: Male   Social History Narrative    .  Has 3 grown daughters.   lives in [place], [place].       Social Determinants of Health     Financial Resource Strain: Low Risk     Difficulty of Paying Living Expenses: Not hard at all   Food Insecurity: No Food Insecurity    Worried About Running Out of Food in the Last Year: Never true    Ran Out of Food in the Last Year: Never true   Transportation Needs: No Transportation Needs    Lack of Transportation (Medical): No    Lack of Transportation (Non-Medical): No   Physical Activity: Insufficiently Active    Days of Exercise per Week: 3 days    Minutes of Exercise per Session: 30 min   Stress: No Stress Concern Present    Feeling of Stress : Only a little   Social Connections: Socially Integrated    Frequency of Communication with Friends and Family: More than three times a week    Frequency of Social Gatherings with Friends and Family: More than three times a week    Attends Yazdanism Services: 1 to 4 times per year    Active Member of Clubs or Organizations: Yes    Attends Club or Organization Meetings: Never    Marital Status:    Housing Stability: Unknown    Unable to Pay for Housing in the Last Year: No    Unstable Housing in the Last Year: No       MEDICATIONS      Current Outpatient Medications:     allopurinoL (ZYLOPRIM) 100 MG  tablet, Take 0.5 tablets (50 mg total) by mouth once daily., Disp: 30 tablet, Rfl: 11    amLODIPine (NORVASC) 10 MG tablet, Take 1 tablet (10 mg total) by mouth once daily., Disp: 90 tablet, Rfl: 3    atorvastatin (LIPITOR) 80 MG tablet, TAKE 1 TABLET BY MOUTH EVERY DAY, Disp: 90 tablet, Rfl: 3    carvediloL (COREG) 12.5 MG tablet, Take 1 tablet (12.5 mg total) by mouth 2 (two) times daily with meals., Disp: 180 tablet, Rfl: 3    colchicine (MITIGARE) 0.6 mg Cap, Take 1 capsule (0.6 mg total) by mouth once daily., Disp: 30 capsule, Rfl: 11    fluticasone propionate (FLONASE) 50 mcg/actuation nasal spray, SPRAY ONCE INTO EACH NOSTRIL ONCE DAILY, Disp: 16 mL, Rfl: 3    hydroCHLOROthiazide (HYDRODIURIL) 25 MG tablet, TAKE 1 TABLET BY MOUTH EVERY DAY, Disp: 90 tablet, Rfl: 3    hydrocortisone 2.5 % ointment, as needed., Disp: , Rfl:     ibuprofen (ADVIL,MOTRIN) 600 MG tablet, Take 1 tablet (600 mg total) by mouth every 6 (six) hours as needed for Pain., Disp: 20 tablet, Rfl: 0    ketoconazole (NIZORAL) 2 % cream, APPLY BETWEEN TOES TWICE DAILY X 2 WKS, Disp: , Rfl:     LIDOcaine (LIDODERM) 5 %, Place 1 patch onto the skin once daily. Remove & Discard patch within 12 hours or as directed by MD, Disp: 15 patch, Rfl: 0    methylPREDNISolone (MEDROL DOSEPACK) 4 mg tablet, use as directed, Disp: 21 each, Rfl: 0    montelukast (SINGULAIR) 10 mg tablet, Take 1 tablet (10 mg total) by mouth every evening., Disp: 90 tablet, Rfl: 6    pantoprazole (PROTONIX) 40 MG tablet, TAKE 1 TABLET BY MOUTH EVERY DAY, Disp: 90 tablet, Rfl: 0  No current facility-administered medications for this visit.    Facility-Administered Medications Ordered in Other Visits:     sodium hyaluronate (EUFLEXXA) 10 mg/mL(mw 2.4 -3.6 million) injection 20 mg, 20 mg, Intra-articular, , Armani Cai PA-C, 20 mg at 11/14/22 0830    ALLERGIES     Review of patient's allergies indicates:  No Known Allergies      REVIEW OF SYSTEMS   Constitution: Negative.  "Negative for chills, fever and night sweats.   HENT: Negative for congestion and headaches.    Eyes: Negative for blurred vision, left vision loss and right vision loss.   Cardiovascular: Negative for chest pain and syncope.   Respiratory: Negative for cough and shortness of breath.    Endocrine: Negative for polydipsia, polyphagia and polyuria.   Hematologic/Lymphatic: Negative for bleeding problem. Does not bruise/bleed easily.   Skin: Negative for dry skin, itching and rash.   Musculoskeletal: Negative for falls. Positive for right shoulder pain  Gastrointestinal: Negative for abdominal pain and bowel incontinence.   Genitourinary: Negative for bladder incontinence and nocturia.   Neurological: Negative for disturbances in coordination, loss of balance and seizures.   Psychiatric/Behavioral: Negative for depression. The patient does not have insomnia.    Allergic/Immunologic: Negative for hives and persistent infections.     PHYSICAL EXAMINATION    Vitals: BP (!) 156/87   Pulse 82   Ht 5' 3" (1.6 m)   Wt 59.4 kg (131 lb)   BMI 23.21 kg/m²     General: The patient appears active and healthy with no apparent physical problems.  No disturbance of mood or affect is demonstrated. Alert and Oriented.    HEENT: Eyes normal, pupils equally round, nose normal.    Resp: Equal and symmetrical chest rises. No wheezing    CV: Regular rate    Neck: Supple; nonpainful range of motion.    Extremities: no cyanosis, clubbing, edema, or diffuse swelling.  Palpable pulses, good capillary refill of the digits.  No coolness, discoloration, edema or obvious varicosities.    Skin: no lesions noted.    Lymphatic: no detected adenopathy in the upper or lower extremities.    Neurologic: normal mental status, normal reflexes, normal gait and balance.  Patient is alert and oriented to person, place and time.  No flaccidity or spasticity is noted.  No motor or sensory deficits are noted.  Light touch is intact    Orthopaedic:     SHOULDER " EXAM - RIGHT    Inspection:   Normal skin color and appearance with no scars.  No muscle atrophy noted.  No scapular winging.      Palpation: No tenderness of the acromioclavicular joint, lateral edge of the acromion, biceps tendon, trapezius muscle or scapulothoracic bursa.  Crepitus of the glenohumeral joint    ROM:      PROM:     FE - 150°    Abd/ER -  80°  Abd/IR -  30°   Add/ER -  40°     AROM:    FE - 150°  with pain beyond 90°.  Abd/ER -  80°  Abd/IR -  30°   Add/ER -  40°  *pain resisted ER       Tests:     - Bonilla, - Neer's, - Cross Arm Adduction, - [place], - [place], - Speed. - Belly Press,  + Jobes, - Lift Off    Stability: - sulcus, - apprehension, - relocation, - load and shift, - DLS      Motor:  Rotator cuff strength is 5/5 supraspinatus, 5/5 infraspinatus, 5/5 subscapularis. Biceps, triceps and deltoid strength is 5/5.      Neuro     Distally there are no paresthesias, and sensation is intact to light touch in the median, ulnar, and radial distributions.  Reflexes are 2/2 biceps, triceps and brachioradialis.        IMAGING    XR ARTHROGRAM SHOULDER RIGHT, INJECTION ONLY WITH CT TO FOLLOW (XPD)  Narrative: EXAMINATION:  XR ARTHROGRAM SHOULDER RIGHT, INJECTION ONLY WITH CT TO FOLLOW (XPD)    CLINICAL HISTORY:  Pain in right shoulder    TECHNIQUE:  Written informed consent was obtained and the patient was prepped and draped in a sterile fashion.  The right shoulder was localized under fluoroscopy and 1% lidocaine was used to achieve local anesthesia.  A 22-gauge spinal needle was inserted into the joint via an anterior approach and position was confirmed with low resistance injection.  Solution of 12 mL dilated Omnipaque 300 (50% contrast: 50% normal saline) was injected.  Needle was removed and adequate hemostasis was achieved.    Fluoroscopy time = 27 seconds    COMPARISON:  Shoulder radiograph 01/27/2020    FINDINGS:  Intra-articular contrast is noted.  Impression: Successful right shoulder  arthrogram.  CT report to follow.    Electronically signed by resident: Misha Greenwood  Date:    02/03/2023  Time:    13:03    Electronically signed by: Musa Maloney MD  Date:    02/03/2023  Time:    15:02  CT Arthrogram Shoulder Right  Narrative: EXAMINATION:  CT ARTHROGRAM SHOULDER RIGHT    CLINICAL HISTORY:  Shoulder pain, rotator cuff disorder suspected, xray done;  Pain in right shoulder    TECHNIQUE:  Routine right multiplanar CT arthrogram shoulder performed after the intra-articular injection of contrast.    COMPARISON:  None    FINDINGS:  Rotator cuff: There is a large full-thickness rotator cuff tear involving the entire supra and infraspinatus tendons.  There is tendon retraction to the glenoid rim.  The teres minor and subscapularis are intact.  There is mild volume loss of both supra and infraspinatus muscles.  High-riding humeral head noted abutting the acromion.    Mild AC joint arthropathy.    The biceps tendon not identified, probably torn.    Generalized cartilage thinning/joint space narrowing noted with mild osteophytosis along the inferior margin of the humeral head.  No fractures or marrow replacement process.  No evidence of osteomyelitis.  No axillary lymphadenopathy.  Emphysematous changes noted in the right lung apex.  Impression: Large full-thickness rotator cuff tear, involving the infraspinatus and supraspinatus tendons, as above.    This report was flagged in Epic as abnormal.    Electronically signed by: Musa Maloney MD  Date:    02/03/2023  Time:    13:22        IMPRESSION       ICD-10-CM ICD-9-CM   1. Traumatic complete tear of right rotator cuff, initial encounter  S46.011A 840.4   2. Localized primary osteoarthritis of right shoulder region  M19.011 715.11       MEDICATIONS PRESCRIBED      None    RECOMMENDATIONS     CSI of right sub-acromial bursa performed under ultrasound guidance  Surgical versus non-surgical options discussed today at length; right reverse total shoulder  arthroplasty with transfer the biceps tendon.  She understands that this point she is not a surgical candidate based on her current hematological issues and potentially remaining vitamin-D deficient.  I advised her she would need to obtain medical clearance and that there is no urgency on surgery at this point.  She is still maintaining good range of motion with pain being her primary complaint.  I advised her we can continue injections as needed.  RTC PRN    Large Joint Aspiration/Injection: R subacromial bursa    Date/Time: 5/1/2023 10:30 AM  Performed by: Carlos Garcia MD  Authorized by: Carlos Garcia MD     Consent Done?:  Yes (Verbal)  Indications:  Pain  Site marked: the procedure site was marked    Timeout: prior to procedure the correct patient, procedure, and site was verified    Prep: patient was prepped and draped in usual sterile fashion      Local anesthesia used?: Yes    Local anesthetic:  Co-phenylcaine spray (0.2% Naropin)  Anesthetic total (ml):  4      Details:  Needle Size:  22 G  Ultrasonic Guidance for needle placement?: Yes (Ultrasound guidance was used for needle localization.  Images were saved and stored for documentation.  Dynamic visualization of the needle was continuous throughout the procedure and maintained accurate placement)    Images are saved and documented.  Approach:  Posterior  Location:  Shoulder  Site:  R subacromial bursa  Medications:  40 mg methylPREDNISolone acetate 40 mg/mL  Medications comment:  5 mL LIDOcaine HCL 10 mg/ml (1%) 10 mg/mL (1 %)  Patient tolerance:  Patient tolerated the procedure well with no immediate complications        All questions were answered, pt will contact us for questions or concerns in the interim.

## 2023-05-01 NOTE — PROGRESS NOTES
[facility]   Physical Therapy Daily Treatment Note     Name: Jessica Floyd  Clinic Number: [id number]    Therapy Diagnosis:   Encounter Diagnoses   Name Primary?    Traumatic complete tear of right rotator cuff, initial encounter      Localized primary osteoarthritis of right shoulder region      Physician: Armani Cai PA-C    Visit Date: 5/1/2023    Physician Orders: Physical Therapy Evaluate and Treat  Medical Diagnosis from Referral: Traumatic complete tear of right rotator cuff, initial encounter (S46.011A); Localized primary osteoarthritis of right shoulder region (M19.011 )  Evaluation Date: 2/15/2023                 Authorization Period Expiration: 2/6/2024  Progress Note Due: 6/1/2023  Plan of Care Expiration: 4/6/2023 to 06/30/2023  Visit # / Visits authorized: 17/40 (+eval)   FOTO: 5/1/2023 (4/5)  PTA Visit #: 0/5    Precautions: Standard    Time In: 8:23 am (late arrival)   Time Out: 9:00 am    Total Billable Time: 27  minutes      SUBJECTIVE   Pt reports: she is doing okay today.  She has an appointment with the dermatologist and the orthopedic surgeon in [place] today.  She continues to have pain but her RANGE OF MOTION has improved since starting therapy.  She really doesn't want surgery but if it will get rid of the pain, she is willing to do it.  She also was at [employer] all weekend and did a lot of walking.        She was not compliant with home exercise program.  Response to previous treatment: felt good, decreased pain  Functional change: None today    Pain: 1/10 sitting still (12/10 with certain movements   Location: R shoulder     OBJECTIVE        Active Range of Motion:   Shoulder Right Left   Flexion 130* 125   Abduction 80 80    ER  T2 T2    IR  TL junction T9      Strength:  Shoulder Right Left   Flexion 3+/5* 4/5   Abduction 3+/5* 4/5   ER 3/5* 4-/5   IR 4-/5* 4/5         CMS Impairment/Limitation/Restriction for FOTO Shoulder Survey    Therapist reviewed  "FOTO scores for Jessica Floyd on 5/1/2023.   FOTO documents entered into [employer] - see Media section.    Limitation Score: 50%  Category: Other    Current : CK = at least 40% but < 60% impaired, limited or restricted  Goal: CJ = at least 20% but < 40% impaired, limited or restricted          TREATMENT     Jessica received the bolded treatments listed below:      Patient received therapeutic exercises for 15 minutes for improved strength and AROM including:  Pulleys flexion x3 minutes  Re-assessment  Bicep curl with YTB x30 on right   Tricep extension with YTB x30 on right  Standing table slides with ball flexion/scaption x30 each      Not performed today:  Shoulder isometrics flex/INTERNAL ROTATION/ER/ABD 10x 10 second hold   PROM flex/scaption/abd/ER/IR  Seated thoracic extension with 1/2 foam 2x 15 with 5 second holds   Supine with HOB elevated AAROM with dowel 3x 10   Seated Rows with RTB 3x 10  Seated extension with RTB 3x 10  Seated IR with towel roll to neutral with RTB 3x 10  Seated ER with towel roll to neutral with RTB 3x 10  Table slides flx/abd x20 ea  AAROM into flexion with dowel x10 - stopped due to pain  Serratus punches x15 with dowel   Palloff press in sitting with double red theratube x10 bilateral   Shoulder pendulum circles x20 ea     Table circles x20   Wall slides x20  Ball press IR/ER iso with pilates ring 3" hold x20    Shoulder pendulum hang 2'    Patient received manual therapeutic technique for 00 minutes for improved soft tissue and joint mobility including:  Shoulder PROM flexion/ABD/ER  A/P and inferior GHJ mobilizations grade I-II with PROM  Oscillations with PROM for relaxation  STM to global shoulder    PROM R shoulder  Grade 1-2 joint glides in all directions for decreased muscle guarding and pain   STM to R shoulder    Patient received neuromuscular reeducation for 12 minutes for improved proprioception including:  HOB elevated ER/IR perturbations through ranges with YTB and ice x5 " minutes  Ball press into mat with yellow physioball flex/ABD 3 second holds x30; circles CW/CCW x30 each        Patient received therapeutic activities for 00 minutes for improved tolerance to functional activities including:      Jessica received hot pack for 10 minutes to increase circulation and promote tissue healing prior to exercises with FOTO and subjective/re-assessment.    Jessica received cold pack for 5 minutes to to decrease circulation, pain, and swelling after exercises.      PATIENT EDUCATION AND HOME EXERCISES     Home Exercises Provided and Patient Education Provided     Education provided:   PT educated pt on importance of compliance with their HEP this visit.     Written Home Exercises Provided: Patient instructed to cont prior HEP. Exercises were reviewed and Jessica was able to demonstrate them prior to the end of the session.  Jessica demonstrated fair  understanding of the education provided. See EMR under Patient Instructions for exercises provided during therapy sessions    ASSESSMENT      Limited treatment today as patient arrived late to therapy.  At monthly re-assessment, flexion AROM has improved but she remains limited with ER/IR and abduction appears to have decreased since last re-assessment.  She reports pain has continued to get worse with her activities and FOTO scores reflect this reduced functional use of the shoulder.  Continue with gentle RANGE OF MOTION and strengthening as tolerated.     Jessica Is slowly progressing well towards her goals.   Pt prognosis is Fair.     Pt will continue to benefit from skilled outpatient physical therapy to address the deficits listed in the problem list box on initial evaluation, provide pt/family education and to maximize pt's level of independence in the home and community environment.     Pt's spiritual, cultural and educational needs considered and pt agreeable to plan of care and goals.     Anticipated barriers to physical therapy:  None    Goals:    Short Term Goals:  4 weeks  1.Report decreased R shoulder pain pain < / =  6/10  to increase tolerance for reaching MET  2. Increase PROM to 120 degrees R shoulder flexion MET  3. Increased strength by 1/3 MMT grade in R shoulder abduction to increase tolerance for ADL and work activities. (progressing, not met)  4. Pt to tolerate HEP to improve ROM and independence with ACTIVITIES OF DAILY LIVING's MET     Long Term Goals: 8 weeks  1.Report decreased R shoulder pain  < / =  3 /10  to increase tolerance for lifting (progressing, not met)  2.Increase AROM to 150 deg R shoulder flexion (progressing, not met)  3.Increase strength to >/= 4/5 in R shoulder abduction to increase tolerance for ADL and work activities. (progressing, not met)  4. Pt goal: perform ADLs without shoulder pain. (progressing, not met)  5. Pt will have improved gcode of CJ (20-40% limited) on FOTO shoulder in order to demonstrate true functional improvement. (progressing, not met)    PLAN     Improve R shoulder ROM and strength     Updated Certification Period: 4/6/2023 to 06/30/2023  Recommended Treatment Plan: 2 times per week for 12 weeks: Manual Therapy, Moist Heat/ Ice, Neuromuscular Re-ed, Patient Education, Therapeutic Activities, and Therapeutic Exercise      Leila Hairston, PT

## 2023-05-03 DIAGNOSIS — D50.8 OTHER IRON DEFICIENCY ANEMIA: Primary | ICD-10-CM

## 2023-05-04 ENCOUNTER — CLINICAL SUPPORT (OUTPATIENT)
Dept: REHABILITATION | Facility: OTHER | Age: 73
End: 2023-05-04
Payer: MEDICARE

## 2023-05-04 DIAGNOSIS — M19.011 LOCALIZED PRIMARY OSTEOARTHRITIS OF RIGHT SHOULDER REGION: Primary | ICD-10-CM

## 2023-05-04 DIAGNOSIS — S46.011A TRAUMATIC COMPLETE TEAR OF RIGHT ROTATOR CUFF, INITIAL ENCOUNTER: ICD-10-CM

## 2023-05-04 PROCEDURE — 97112 NEUROMUSCULAR REEDUCATION: CPT | Mod: PN

## 2023-05-04 PROCEDURE — 97110 THERAPEUTIC EXERCISES: CPT | Mod: PN

## 2023-05-04 NOTE — PROGRESS NOTES
[facility]   Physical Therapy Daily Treatment Note     Name: Jessica Floyd  Clinic Number: [id number]    Therapy Diagnosis:   Encounter Diagnoses   Name Primary?    Traumatic complete tear of right rotator cuff, initial encounter      Localized primary osteoarthritis of right shoulder region      Physician: Armani Cai PA-C    Visit Date: 5/4/2023    Physician Orders: Physical Therapy Evaluate and Treat  Medical Diagnosis from Referral: Traumatic complete tear of right rotator cuff, initial encounter (S46.011A); Localized primary osteoarthritis of right shoulder region (M19.011 )  Evaluation Date: 2/15/2023                 Authorization Period Expiration: 2/6/2024  Progress Note Due: 6/1/2023  Plan of Care Expiration: 4/6/2023 to 06/30/2023  Visit # / Visits authorized: 18/40 (+eval)   FOTO: 5/1/2023 (4/5)  PTA Visit #: 0/5    Precautions: Standard    Time In: 8:20 am (late arrival)   Time Out: 9:10 am    Total Billable Time: 30 minutes      SUBJECTIVE   Pt reports: she is not doing good today.  She saw the orthopedic surgeon the other day and was given an injection. She is not a candidate for surgery at this point due to there other health conditions.  The popping when she moves her shoulder is the bone hitting the bone.   She was able to put on a real bra today which is a new thing for her lately.      She was not compliant with home exercise program.  Response to previous treatment: increased pain  Functional change: None today    Pain: 3/10   Location: R shoulder     OBJECTIVE      Objective measures updated at progress report unless otherwise noted.      TREATMENT     Jessica received the bolded treatments listed below:      Patient received therapeutic exercises for 15 minutes for improved strength and AROM including:  Pulleys flexion x3 minutes  Rows with RTB 3x 10   Bicep curl with YTB x30 on right   Tricep extension with RTB x30 on right  Standing table slides with ball  "flexion/scaption x30 each  + ball roll up HOB incline 3x 10        Not performed today:  Shoulder isometrics flex/INTERNAL ROTATION/ER/ABD 10x 10 second hold   PROM flex/scaption/abd/ER/IR  Seated thoracic extension with 1/2 foam 2x 15 with 5 second holds   Supine with HOB elevated AAROM with dowel 3x 10   Seated Rows with RTB 3x 10  Seated extension with RTB 3x 10  Seated IR with towel roll to neutral with RTB 3x 10  Seated ER with towel roll to neutral with RTB 3x 10  Table slides flx/abd x20 ea  AAROM into flexion with dowel x10 - stopped due to pain  Serratus punches x15 with dowel   Palloff press in sitting with double red theratube x10 bilateral   Shoulder pendulum circles x20 ea     Table circles x20   Wall slides x20  Ball press IR/ER iso with pilates ring 3" hold x20    Shoulder pendulum hang 2'    Patient received manual therapeutic technique for 00 minutes for improved soft tissue and joint mobility including:  Shoulder PROM flexion/ABD/ER  A/P and inferior GHJ mobilizations grade I-II with PROM  Oscillations with PROM for relaxation  STM to global shoulder    PROM R shoulder  Grade 1-2 joint glides in all directions for decreased muscle guarding and pain   STM to R shoulder    Patient received neuromuscular reeducation for 15 minutes for improved proprioception including:  HOB elevated ER/IR perturbations through ranges with YTB and ice x5 minutes  Ball press into mat with yellow physioball flex/scaption 3 second holds x30; circles CW/CCW x30 each    Patient received therapeutic activities for 00 minutes for improved tolerance to functional activities including:      Jessica received hot pack for 10 minutes to increase circulation and promote tissue healing prior to exercises.    Jessica received cold pack for 10 minutes to to decrease circulation, pain, and swelling after exercises.      PATIENT EDUCATION AND HOME EXERCISES     Home Exercises Provided and Patient Education Provided     Education provided: "   PT educated pt on importance of compliance with their HEP this visit.     Written Home Exercises Provided: Patient instructed to cont prior HEP. Exercises were reviewed and Jessica was able to demonstrate them prior to the end of the session.  Jessica demonstrated fair  understanding of the education provided. See EMR under Patient Instructions for exercises provided during therapy sessions    ASSESSMENT      Added ball roll up incline for shoulder flexion today without increase in symptoms.  Better tolerance to therapy today compared to prior visits with reduced pain upon arrival.  Continue with RANGE OF MOTION and gentle strengthening as tolerated.      Jessica Is slowly progressing well towards her goals.   Pt prognosis is Fair.     Pt will continue to benefit from skilled outpatient physical therapy to address the deficits listed in the problem list box on initial evaluation, provide pt/family education and to maximize pt's level of independence in the home and community environment.     Pt's spiritual, cultural and educational needs considered and pt agreeable to plan of care and goals.     Anticipated barriers to physical therapy: None    Goals:    Short Term Goals:  4 weeks  1.Report decreased R shoulder pain pain < / =  6/10  to increase tolerance for reaching MET  2. Increase PROM to 120 degrees R shoulder flexion MET  3. Increased strength by 1/3 MMT grade in R shoulder abduction to increase tolerance for ADL and work activities. (progressing, not met)  4. Pt to tolerate HEP to improve ROM and independence with ACTIVITIES OF DAILY LIVING's MET     Long Term Goals: 8 weeks  1.Report decreased R shoulder pain  < / =  3 /10  to increase tolerance for lifting (progressing, not met)  2.Increase AROM to 150 deg R shoulder flexion (progressing, not met)  3.Increase strength to >/= 4/5 in R shoulder abduction to increase tolerance for ADL and work activities. (progressing, not met)  4. Pt goal: perform ADLs without  shoulder pain. (progressing, not met)  5. Pt will have improved gcode of CJ (20-40% limited) on FOTO shoulder in order to demonstrate true functional improvement. (progressing, not met)    PLAN     Improve R shoulder ROM and strength     Updated Certification Period: 4/6/2023 to 06/30/2023  Recommended Treatment Plan: 2 times per week for 12 weeks: Manual Therapy, Moist Heat/ Ice, Neuromuscular Re-ed, Patient Education, Therapeutic Activities, and Therapeutic Exercise      Leila Hairston, PT

## 2023-05-05 LAB
CTP QC/QA: YES
FECAL OCCULT BLOOD, POC: NEGATIVE

## 2023-05-08 ENCOUNTER — INFUSION (OUTPATIENT)
Dept: INFUSION THERAPY | Facility: OTHER | Age: 73
End: 2023-05-08
Attending: STUDENT IN AN ORGANIZED HEALTH CARE EDUCATION/TRAINING PROGRAM
Payer: MEDICARE

## 2023-05-08 VITALS
HEART RATE: 77 BPM | SYSTOLIC BLOOD PRESSURE: 135 MMHG | TEMPERATURE: 99 F | DIASTOLIC BLOOD PRESSURE: 93 MMHG | OXYGEN SATURATION: 96 % | RESPIRATION RATE: 16 BRPM

## 2023-05-08 DIAGNOSIS — D50.0 IRON DEFICIENCY ANEMIA SECONDARY TO BLOOD LOSS (CHRONIC): Primary | ICD-10-CM

## 2023-05-08 PROCEDURE — 96365 THER/PROPH/DIAG IV INF INIT: CPT

## 2023-05-08 PROCEDURE — 63600175 PHARM REV CODE 636 W HCPCS: Performed by: STUDENT IN AN ORGANIZED HEALTH CARE EDUCATION/TRAINING PROGRAM

## 2023-05-08 PROCEDURE — 25000003 PHARM REV CODE 250: Performed by: STUDENT IN AN ORGANIZED HEALTH CARE EDUCATION/TRAINING PROGRAM

## 2023-05-08 RX ORDER — DIPHENHYDRAMINE HYDROCHLORIDE 50 MG/ML
50 INJECTION INTRAMUSCULAR; INTRAVENOUS ONCE AS NEEDED
Status: CANCELLED | OUTPATIENT
Start: 2023-05-15

## 2023-05-08 RX ORDER — EPINEPHRINE 0.3 MG/.3ML
0.3 INJECTION SUBCUTANEOUS ONCE AS NEEDED
Status: CANCELLED | OUTPATIENT
Start: 2023-05-08

## 2023-05-08 RX ORDER — HEPARIN 100 UNIT/ML
500 SYRINGE INTRAVENOUS
Status: CANCELLED | OUTPATIENT
Start: 2023-05-15

## 2023-05-08 RX ORDER — METHYLPREDNISOLONE SOD SUCC 125 MG
125 VIAL (EA) INJECTION ONCE AS NEEDED
Status: CANCELLED | OUTPATIENT
Start: 2023-05-08

## 2023-05-08 RX ORDER — DIPHENHYDRAMINE HYDROCHLORIDE 50 MG/ML
50 INJECTION INTRAMUSCULAR; INTRAVENOUS ONCE AS NEEDED
Status: CANCELLED | OUTPATIENT
Start: 2023-05-08

## 2023-05-08 RX ORDER — SODIUM CHLORIDE 0.9 % (FLUSH) 0.9 %
10 SYRINGE (ML) INJECTION
Status: CANCELLED | OUTPATIENT
Start: 2023-05-15

## 2023-05-08 RX ORDER — METHYLPREDNISOLONE SOD SUCC 125 MG
125 VIAL (EA) INJECTION ONCE AS NEEDED
Status: CANCELLED | OUTPATIENT
Start: 2023-05-15

## 2023-05-08 RX ORDER — HEPARIN 100 UNIT/ML
500 SYRINGE INTRAVENOUS
Status: DISCONTINUED | OUTPATIENT
Start: 2023-05-08 | End: 2023-05-08 | Stop reason: HOSPADM

## 2023-05-08 RX ORDER — SODIUM CHLORIDE 0.9 % (FLUSH) 0.9 %
10 SYRINGE (ML) INJECTION
Status: DISCONTINUED | OUTPATIENT
Start: 2023-05-08 | End: 2023-05-08 | Stop reason: HOSPADM

## 2023-05-08 RX ORDER — EPINEPHRINE 0.3 MG/.3ML
0.3 INJECTION SUBCUTANEOUS ONCE AS NEEDED
Status: CANCELLED | OUTPATIENT
Start: 2023-05-15

## 2023-05-08 RX ADMIN — IRON SUCROSE 200 MG: 20 INJECTION, SOLUTION INTRAVENOUS at 08:05

## 2023-05-08 RX ADMIN — SODIUM CHLORIDE: 9 INJECTION, SOLUTION INTRAVENOUS at 08:05

## 2023-05-08 NOTE — PLAN OF CARE
Vital Signs Stable, No apparent distress noted; Pt tolerated ____Venofer _ infusion w/o difficulty.  AVS/Discharge instructions given;all questions answered Pt verbalizes understanding. Next appointments scheduled and sent via [employer]; ambulated from clinic in [facility]

## 2023-05-09 NOTE — PROGRESS NOTES
[facility]   Physical Therapy Daily Treatment Note     Name: Jessica Floyd  Clinic Number: [id number]    Therapy Diagnosis:   Encounter Diagnoses   Name Primary?    Traumatic complete tear of right rotator cuff, initial encounter      Localized primary osteoarthritis of right shoulder region      Physician: Armani Cai PA-C    Visit Date: 5/10/2023    Physician Orders: Physical Therapy Evaluate and Treat  Medical Diagnosis from Referral: Traumatic complete tear of right rotator cuff, initial encounter (S46.011A); Localized primary osteoarthritis of right shoulder region (M19.011 )  Evaluation Date: 2/15/2023                 Authorization Period Expiration: 2/6/2024  Progress Note Due: 6/1/2023  Plan of Care Expiration: 4/6/2023 to 06/30/2023  Visit # / Visits authorized: 19/40 (+eval)   FOTO: 5/1/2023 (4/5)  PTA Visit #: 0/5    Precautions: Standard    Time In: 8:58 am   Time Out: 10:00 am     Total Billable Time: 50 minutes      SUBJECTIVE   Pt reports: she isn't having a good day today.  Her shoulder is really bothering her but she attributes this to the arthritis and the weather.  She is still upset by what the doctor told her last week.       She was not compliant with home exercise program.  Response to previous treatment: increased pain  Functional change: None today    Pain: 3/10   Location: R shoulder     OBJECTIVE      Objective measures updated at progress report unless otherwise noted.      TREATMENT     Jessica received the bolded treatments listed below:      Patient received therapeutic exercises for 27 minutes for improved strength and AROM including:  Pulleys flexion x3 minutes  Seated Rows with RTB 3x 10   Seated extension with RTB 3x 10  Standing table slides with ball flexion/scaption x3 minutes each  + ball roll up HOB incline x3 minutes        Not performed today:  Bicep curl with YTB x30 on right   Tricep extension with RTB x30 on right  Shoulder isometrics  "flex/INTERNAL ROTATION/ER/ABD 10x 10 second hold   PROM flex/scaption/abd/ER/IR  Seated thoracic extension with 1/2 foam 2x 15 with 5 second holds   Supine with HOB elevated AAROM with dowel 3x 10   Seated IR with towel roll to neutral with RTB 3x 10  Seated ER with towel roll to neutral with RTB 3x 10  Table slides flx/abd x20 ea  AAROM into flexion with dowel x10 - stopped due to pain  Serratus punches x15 with dowel   Palloff press in sitting with double red theratube x10 bilateral   Shoulder pendulum circles x20 ea     Table circles x20   Wall slides x20  Ball press IR/ER iso with pilates ring 3" hold x20    Shoulder pendulum hang 2'    Patient received manual therapeutic technique for 08 minutes for improved soft tissue and joint mobility including:  Taping to improve anterior shoulder positioning on right.  Education regarding purpose of tape and removal provided with patient verbalizing understanding.     Shoulder PROM flexion/ABD/ER  A/P and inferior GHJ mobilizations grade I-II with PROM  Oscillations with PROM for relaxation  STM to global shoulder  PROM R shoulder  Grade 1-2 joint glides in all directions for decreased muscle guarding and pain   STM to R shoulder    Patient received neuromuscular reeducation for 15 minutes for improved proprioception including:  HOB elevated ER/IR perturbations through ranges with YTB and ice x5 minutes  Ball press into mat with yellow physioball flex/scaption 5 second holds x3 minutes; circles CW/CCW x30 each    Patient received therapeutic activities for 00 minutes for improved tolerance to functional activities including:      Jessica received hot pack for 10 minutes to increase circulation and promote tissue healing prior to exercises.    Jessica received cold pack for 00 minutes to to decrease circulation, pain, and swelling after exercises.      PATIENT EDUCATION AND HOME EXERCISES     Home Exercises Provided and Patient Education Provided     Education provided:   PT " educated pt on importance of compliance with their HEP this visit.   Taping purpose, removal, adverse reactions to tape  Inteliskin shirt for posture    Written Home Exercises Provided: Patient instructed to cont prior HEP. Exercises were reviewed and Jessica was able to demonstrate them prior to the end of the session.  Jessica demonstrated fair  understanding of the education provided. See EMR under Patient Instructions for exercises provided during therapy sessions    ASSESSMENT      Initiated taping to reduce anterior shoulder positioning to provide more support and promote proper alignment for the shoulder and reduce pain.  Patient states feeling more supported with tape.  Held off with ice per pt request as she is cold today.  Continued with neuromuscular control and RANGE OF MOTION without increase in symptoms.       Jessica Is slowly progressing well towards her goals.   Pt prognosis is Fair.     Pt will continue to benefit from skilled outpatient physical therapy to address the deficits listed in the problem list box on initial evaluation, provide pt/family education and to maximize pt's level of independence in the home and community environment.     Pt's spiritual, cultural and educational needs considered and pt agreeable to plan of care and goals.     Anticipated barriers to physical therapy: None    Goals:    Short Term Goals:  4 weeks  1.Report decreased R shoulder pain pain < / =  6/10  to increase tolerance for reaching MET  2. Increase PROM to 120 degrees R shoulder flexion MET  3. Increased strength by 1/3 MMT grade in R shoulder abduction to increase tolerance for ADL and work activities. (progressing, not met)  4. Pt to tolerate HEP to improve ROM and independence with ACTIVITIES OF DAILY LIVING's MET     Long Term Goals: 8 weeks  1.Report decreased R shoulder pain  < / =  3 /10  to increase tolerance for lifting (progressing, not met)  2.Increase AROM to 150 deg R shoulder flexion (progressing,  not met)  3.Increase strength to >/= 4/5 in R shoulder abduction to increase tolerance for ADL and work activities. (progressing, not met)  4. Pt goal: perform ADLs without shoulder pain. (progressing, not met)  5. Pt will have improved gcode of CJ (20-40% limited) on FOTO shoulder in order to demonstrate true functional improvement. (progressing, not met)    PLAN     Improve R shoulder ROM and strength     Updated Certification Period: 4/6/2023 to 06/30/2023  Recommended Treatment Plan: 2 times per week for 12 weeks: Manual Therapy, Moist Heat/ Ice, Neuromuscular Re-ed, Patient Education, Therapeutic Activities, and Therapeutic Exercise      Leila Hairston, PT

## 2023-05-10 ENCOUNTER — CLINICAL SUPPORT (OUTPATIENT)
Dept: REHABILITATION | Facility: OTHER | Age: 73
End: 2023-05-10
Payer: MEDICARE

## 2023-05-10 DIAGNOSIS — D50.0 IRON DEFICIENCY ANEMIA SECONDARY TO BLOOD LOSS (CHRONIC): Primary | ICD-10-CM

## 2023-05-10 DIAGNOSIS — S46.011A TRAUMATIC COMPLETE TEAR OF RIGHT ROTATOR CUFF, INITIAL ENCOUNTER: ICD-10-CM

## 2023-05-10 DIAGNOSIS — M19.011 LOCALIZED PRIMARY OSTEOARTHRITIS OF RIGHT SHOULDER REGION: ICD-10-CM

## 2023-05-10 PROCEDURE — 97140 MANUAL THERAPY 1/> REGIONS: CPT | Mod: PN

## 2023-05-10 PROCEDURE — 97112 NEUROMUSCULAR REEDUCATION: CPT | Mod: PN

## 2023-05-10 PROCEDURE — 97110 THERAPEUTIC EXERCISES: CPT | Mod: PN

## 2023-05-11 ENCOUNTER — CLINICAL SUPPORT (OUTPATIENT)
Dept: REHABILITATION | Facility: OTHER | Age: 73
End: 2023-05-11
Payer: MEDICARE

## 2023-05-11 DIAGNOSIS — M19.011 LOCALIZED PRIMARY OSTEOARTHRITIS OF RIGHT SHOULDER REGION: Primary | ICD-10-CM

## 2023-05-11 DIAGNOSIS — S46.011A TRAUMATIC COMPLETE TEAR OF RIGHT ROTATOR CUFF, INITIAL ENCOUNTER: ICD-10-CM

## 2023-05-11 PROCEDURE — 97110 THERAPEUTIC EXERCISES: CPT | Mod: PN

## 2023-05-11 NOTE — PROGRESS NOTES
[facility]   Physical Therapy Daily Treatment Note     Name: Jessica Floyd  Clinic Number: [id number]    Therapy Diagnosis:   Encounter Diagnoses   Name Primary?    Traumatic complete tear of right rotator cuff, initial encounter      Localized primary osteoarthritis of right shoulder region      Physician: Armani Cai PA-C    Visit Date: 5/11/2023    Physician Orders: Physical Therapy Evaluate and Treat  Medical Diagnosis from Referral: Traumatic complete tear of right rotator cuff, initial encounter (S46.011A); Localized primary osteoarthritis of right shoulder region (M19.011 )  Evaluation Date: 2/15/2023                 Authorization Period Expiration: 2/6/2024  Progress Note Due: 6/1/2023  Plan of Care Expiration: 4/6/2023 to 06/30/2023  Visit # / Visits authorized: 20/40 (+eval)   FOTO: 5/1/2023 (4/5)  PTA Visit #: 0/5    Precautions: Standard    Time In: 8:15 am  Time Out: 8:50 am     Total Billable Time: 9 minutes      SUBJECTIVE   Pt reports: the tape really helped her.  She feels like the shoulder is less painful although she continues to have pain and difficulty with RANGE OF MOTION.  She requests to end around 8:45 due to having an appointment at 9 at [facility].        She was not compliant with home exercise program.  Response to previous treatment: increased pain  Functional change: None today    Pain: 3/10   Location: R shoulder     OBJECTIVE      Objective measures updated at progress report unless otherwise noted.      TREATMENT     Jessica received the bolded treatments listed below:      Patient received therapeutic exercises for 12 minutes for improved strength and AROM including:  Pulleys flexion x3 minutes  Seated Rows with RTB 3x 10   Seated extension with RTB 3x 10  Standing table slides with ball flexion/scaption x3 minutes each  + ball roll up HOB incline x3 minutes        Not performed today:  Bicep curl with YTB x30 on right   Tricep extension with RTB x30 on  "right  Shoulder isometrics flex/INTERNAL ROTATION/ER/ABD 10x 10 second hold   PROM flex/scaption/abd/ER/IR  Seated thoracic extension with 1/2 foam 2x 15 with 5 second holds   Supine with HOB elevated AAROM with dowel 3x 10   Seated IR with towel roll to neutral with RTB 3x 10  Seated ER with towel roll to neutral with RTB 3x 10  Table slides flx/abd x20 ea  AAROM into flexion with dowel x10 - stopped due to pain  Serratus punches x15 with dowel   Palloff press in sitting with double red theratube x10 bilateral   Shoulder pendulum circles x20 ea     Table circles x20   Wall slides x20  Ball press IR/ER iso with pilates ring 3" hold x20    Shoulder pendulum hang 2'    Patient received manual therapeutic technique for 00 minutes for improved soft tissue and joint mobility including:  Taping to improve anterior shoulder positioning on right.  Education regarding purpose of tape and removal provided with patient verbalizing understanding.   Shoulder PROM flexion/ABD/ER  A/P and inferior GHJ mobilizations grade I-II with PROM  Oscillations with PROM for relaxation  STM to global shoulder  PROM R shoulder  Grade 1-2 joint glides in all directions for decreased muscle guarding and pain   STM to R shoulder    Patient received neuromuscular reeducation for 12 minutes for improved proprioception including:  HOB elevated ER/IR perturbations through ranges with YTB and ice x5 minutes  Ball press into mat with yellow physioball flex/scaption 5 second holds x3 minutes; circles CW/CCW x30 each    Patient received therapeutic activities for 00 minutes for improved tolerance to functional activities including:      Jessica received hot pack for 10 minutes to increase circulation and promote tissue healing prior to exercises.    Jessica received cold pack for 00 minutes to to decrease circulation, pain, and swelling after exercises.      PATIENT EDUCATION AND HOME EXERCISES     Home Exercises Provided and Patient Education Provided "     Education provided:   PT educated pt on importance of compliance with their HEP this visit.     Written Home Exercises Provided: Patient instructed to cont prior HEP. Exercises were reviewed and Jessica was able to demonstrate them prior to the end of the session.  Jessica demonstrated fair  understanding of the education provided. See EMR under Patient Instructions for exercises provided during therapy sessions    ASSESSMENT      Patient tolerated treatment well without increased discomfort.  Shortened session due to late arrival and request to leave early.  Plan to continue with taping and shoulder girdle strengthening and RANGE OF MOTION     Jessica Is slowly progressing well towards her goals.   Pt prognosis is Fair.     Pt will continue to benefit from skilled outpatient physical therapy to address the deficits listed in the problem list box on initial evaluation, provide pt/family education and to maximize pt's level of independence in the home and community environment.     Pt's spiritual, cultural and educational needs considered and pt agreeable to plan of care and goals.     Anticipated barriers to physical therapy: None    Goals:    Short Term Goals:  4 weeks  1.Report decreased R shoulder pain pain < / =  6/10  to increase tolerance for reaching MET  2. Increase PROM to 120 degrees R shoulder flexion MET  3. Increased strength by 1/3 MMT grade in R shoulder abduction to increase tolerance for ADL and work activities. (progressing, not met)  4. Pt to tolerate HEP to improve ROM and independence with ACTIVITIES OF DAILY LIVING's MET     Long Term Goals: 8 weeks  1.Report decreased R shoulder pain  < / =  3 /10  to increase tolerance for lifting (progressing, not met)  2.Increase AROM to 150 deg R shoulder flexion (progressing, not met)  3.Increase strength to >/= 4/5 in R shoulder abduction to increase tolerance for ADL and work activities. (progressing, not met)  4. Pt goal: perform ADLs without shoulder  pain. (progressing, not met)  5. Pt will have improved gcode of CJ (20-40% limited) on FOTO shoulder in order to demonstrate true functional improvement. (progressing, not met)    PLAN     Improve R shoulder ROM and strength     Updated Certification Period: 4/6/2023 to 06/30/2023  Recommended Treatment Plan: 2 times per week for 12 weeks: Manual Therapy, Moist Heat/ Ice, Neuromuscular Re-ed, Patient Education, Therapeutic Activities, and Therapeutic Exercise      Leila Hairston, PT

## 2023-05-15 ENCOUNTER — INFUSION (OUTPATIENT)
Dept: INFUSION THERAPY | Facility: OTHER | Age: 73
End: 2023-05-15
Attending: FAMILY MEDICINE
Payer: MEDICARE

## 2023-05-15 ENCOUNTER — TELEPHONE (OUTPATIENT)
Dept: RHEUMATOLOGY | Facility: CLINIC | Age: 73
End: 2023-05-15
Payer: MEDICARE

## 2023-05-15 VITALS
OXYGEN SATURATION: 96 % | SYSTOLIC BLOOD PRESSURE: 141 MMHG | RESPIRATION RATE: 16 BRPM | TEMPERATURE: 98 F | DIASTOLIC BLOOD PRESSURE: 64 MMHG | HEART RATE: 77 BPM

## 2023-05-15 DIAGNOSIS — D50.0 IRON DEFICIENCY ANEMIA SECONDARY TO BLOOD LOSS (CHRONIC): Primary | ICD-10-CM

## 2023-05-15 PROCEDURE — 96365 THER/PROPH/DIAG IV INF INIT: CPT

## 2023-05-15 PROCEDURE — 63600175 PHARM REV CODE 636 W HCPCS: Performed by: STUDENT IN AN ORGANIZED HEALTH CARE EDUCATION/TRAINING PROGRAM

## 2023-05-15 PROCEDURE — 25000003 PHARM REV CODE 250: Performed by: STUDENT IN AN ORGANIZED HEALTH CARE EDUCATION/TRAINING PROGRAM

## 2023-05-15 RX ORDER — EPINEPHRINE 0.3 MG/.3ML
0.3 INJECTION SUBCUTANEOUS ONCE AS NEEDED
Status: CANCELLED | OUTPATIENT
Start: 2023-05-22

## 2023-05-15 RX ORDER — METHYLPREDNISOLONE SOD SUCC 125 MG
125 VIAL (EA) INJECTION ONCE AS NEEDED
Status: CANCELLED | OUTPATIENT
Start: 2023-05-22

## 2023-05-15 RX ORDER — HEPARIN 100 UNIT/ML
500 SYRINGE INTRAVENOUS
Status: DISCONTINUED | OUTPATIENT
Start: 2023-05-15 | End: 2023-05-15 | Stop reason: HOSPADM

## 2023-05-15 RX ORDER — SODIUM CHLORIDE 0.9 % (FLUSH) 0.9 %
10 SYRINGE (ML) INJECTION
Status: CANCELLED | OUTPATIENT
Start: 2023-05-22

## 2023-05-15 RX ORDER — DIPHENHYDRAMINE HYDROCHLORIDE 50 MG/ML
50 INJECTION INTRAMUSCULAR; INTRAVENOUS ONCE AS NEEDED
Status: CANCELLED | OUTPATIENT
Start: 2023-05-22

## 2023-05-15 RX ORDER — HEPARIN 100 UNIT/ML
500 SYRINGE INTRAVENOUS
Status: CANCELLED | OUTPATIENT
Start: 2023-05-22

## 2023-05-15 RX ORDER — SODIUM CHLORIDE 0.9 % (FLUSH) 0.9 %
10 SYRINGE (ML) INJECTION
Status: DISCONTINUED | OUTPATIENT
Start: 2023-05-15 | End: 2023-05-15 | Stop reason: HOSPADM

## 2023-05-15 RX ADMIN — SODIUM CHLORIDE: 9 INJECTION, SOLUTION INTRAVENOUS at 08:05

## 2023-05-15 RX ADMIN — IRON SUCROSE 200 MG: 20 INJECTION, SOLUTION INTRAVENOUS at 08:05

## 2023-05-15 NOTE — TELEPHONE ENCOUNTER
----- Message from Luciana Hutchinson sent at 5/15/2023  1:52 PM CDT -----  Type:  Sooner Apoointment Request    Caller is requesting a sooner appointment.  Caller declined first available appointment listed below.  Caller will not accept being placed on the waitlist and is requesting a message be sent to doctor.  Name of Caller:PT  When is the first available appointment? SCHED FOR 5/23  Symptoms:  Would the patient rather a call back or a response via [facility]?   Best Call Back Number:[phone]  Additional Information: NEEDS TO R/S APPT BUT SOONER THAN 8/23

## 2023-05-18 ENCOUNTER — OFFICE VISIT (OUTPATIENT)
Dept: RHEUMATOLOGY | Facility: CLINIC | Age: 73
End: 2023-05-18
Payer: MEDICARE

## 2023-05-18 VITALS
HEART RATE: 81 BPM | SYSTOLIC BLOOD PRESSURE: 148 MMHG | DIASTOLIC BLOOD PRESSURE: 83 MMHG | WEIGHT: 123.31 LBS | OXYGEN SATURATION: 95 % | HEIGHT: 63 IN | BODY MASS INDEX: 21.85 KG/M2

## 2023-05-18 DIAGNOSIS — M15.9 PRIMARY OSTEOARTHRITIS INVOLVING MULTIPLE JOINTS: ICD-10-CM

## 2023-05-18 DIAGNOSIS — Z71.89 COUNSELING AND COORDINATION OF CARE: ICD-10-CM

## 2023-05-18 DIAGNOSIS — M1A.9XX0 CHRONIC GOUT WITHOUT TOPHUS, UNSPECIFIED CAUSE, UNSPECIFIED SITE: Primary | ICD-10-CM

## 2023-05-18 DIAGNOSIS — Z79.899 ENCOUNTER FOR LONG-TERM (CURRENT) USE OF OTHER MEDICATIONS: ICD-10-CM

## 2023-05-18 PROCEDURE — 1125F AMNT PAIN NOTED PAIN PRSNT: CPT | Mod: CPTII,S$GLB,, | Performed by: INTERNAL MEDICINE

## 2023-05-18 PROCEDURE — 3008F PR BODY MASS INDEX (BMI) DOCUMENTED: ICD-10-PCS | Mod: CPTII,S$GLB,, | Performed by: INTERNAL MEDICINE

## 2023-05-18 PROCEDURE — 99213 OFFICE O/P EST LOW 20 MIN: CPT | Mod: PO | Performed by: INTERNAL MEDICINE

## 2023-05-18 PROCEDURE — 99214 PR OFFICE/OUTPT VISIT, EST, LEVL IV, 30-39 MIN: ICD-10-PCS | Mod: 25,S$GLB,, | Performed by: INTERNAL MEDICINE

## 2023-05-18 PROCEDURE — 3077F SYST BP >= 140 MM HG: CPT | Mod: CPTII,S$GLB,, | Performed by: INTERNAL MEDICINE

## 2023-05-18 PROCEDURE — 20526 THER INJECTION CARP TUNNEL: CPT | Mod: RT,S$GLB,, | Performed by: INTERNAL MEDICINE

## 2023-05-18 PROCEDURE — 99214 OFFICE O/P EST MOD 30 MIN: CPT | Mod: 25,S$GLB,, | Performed by: INTERNAL MEDICINE

## 2023-05-18 PROCEDURE — 1159F MED LIST DOCD IN RCRD: CPT | Mod: CPTII,S$GLB,, | Performed by: INTERNAL MEDICINE

## 2023-05-18 PROCEDURE — 1159F PR MEDICATION LIST DOCUMENTED IN MEDICAL RECORD: ICD-10-PCS | Mod: CPTII,S$GLB,, | Performed by: INTERNAL MEDICINE

## 2023-05-18 PROCEDURE — 20526 CARPAL TUNNEL: ICD-10-PCS | Mod: RT,S$GLB,, | Performed by: INTERNAL MEDICINE

## 2023-05-18 PROCEDURE — 3288F FALL RISK ASSESSMENT DOCD: CPT | Mod: CPTII,S$GLB,, | Performed by: INTERNAL MEDICINE

## 2023-05-18 PROCEDURE — 3079F PR MOST RECENT DIASTOLIC BLOOD PRESSURE 80-89 MM HG: ICD-10-PCS | Mod: CPTII,S$GLB,, | Performed by: INTERNAL MEDICINE

## 2023-05-18 PROCEDURE — 1101F PR PT FALLS ASSESS DOC 0-1 FALLS W/OUT INJ PAST YR: ICD-10-PCS | Mod: CPTII,S$GLB,, | Performed by: INTERNAL MEDICINE

## 2023-05-18 PROCEDURE — 3008F BODY MASS INDEX DOCD: CPT | Mod: CPTII,S$GLB,, | Performed by: INTERNAL MEDICINE

## 2023-05-18 PROCEDURE — 3079F DIAST BP 80-89 MM HG: CPT | Mod: CPTII,S$GLB,, | Performed by: INTERNAL MEDICINE

## 2023-05-18 PROCEDURE — 3077F PR MOST RECENT SYSTOLIC BLOOD PRESSURE >= 140 MM HG: ICD-10-PCS | Mod: CPTII,S$GLB,, | Performed by: INTERNAL MEDICINE

## 2023-05-18 PROCEDURE — 3288F PR FALLS RISK ASSESSMENT DOCUMENTED: ICD-10-PCS | Mod: CPTII,S$GLB,, | Performed by: INTERNAL MEDICINE

## 2023-05-18 PROCEDURE — 1101F PT FALLS ASSESS-DOCD LE1/YR: CPT | Mod: CPTII,S$GLB,, | Performed by: INTERNAL MEDICINE

## 2023-05-18 PROCEDURE — 99999 PR PBB SHADOW E&M-EST. PATIENT-LVL III: CPT | Mod: PBBFAC,,, | Performed by: INTERNAL MEDICINE

## 2023-05-18 PROCEDURE — 1125F PR PAIN SEVERITY QUANTIFIED, PAIN PRESENT: ICD-10-PCS | Mod: CPTII,S$GLB,, | Performed by: INTERNAL MEDICINE

## 2023-05-18 PROCEDURE — 99999 PR PBB SHADOW E&M-EST. PATIENT-LVL III: ICD-10-PCS | Mod: PBBFAC,,, | Performed by: INTERNAL MEDICINE

## 2023-05-18 RX ORDER — TRIAMCINOLONE ACETONIDE 40 MG/ML
40 INJECTION, SUSPENSION INTRA-ARTICULAR; INTRAMUSCULAR
Status: DISCONTINUED | OUTPATIENT
Start: 2023-05-18 | End: 2023-05-18 | Stop reason: HOSPADM

## 2023-05-18 RX ORDER — LIDOCAINE HYDROCHLORIDE 10 MG/ML
1 INJECTION INFILTRATION; PERINEURAL
Status: DISCONTINUED | OUTPATIENT
Start: 2023-05-18 | End: 2023-05-18 | Stop reason: HOSPADM

## 2023-05-18 RX ORDER — ALLOPURINOL 100 MG/1
200 TABLET ORAL DAILY
Qty: 60 TABLET | Refills: 11 | Status: SHIPPED | OUTPATIENT
Start: 2023-05-18 | End: 2023-08-22 | Stop reason: SDUPTHER

## 2023-05-18 RX ORDER — COLCHICINE 0.6 MG/1
1 CAPSULE ORAL DAILY
Qty: 30 CAPSULE | Refills: 11 | Status: ON HOLD | OUTPATIENT
Start: 2023-05-18 | End: 2023-05-30 | Stop reason: HOSPADM

## 2023-05-18 RX ADMIN — LIDOCAINE HYDROCHLORIDE 1 ML: 10 INJECTION INFILTRATION; PERINEURAL at 09:05

## 2023-05-18 RX ADMIN — TRIAMCINOLONE ACETONIDE 40 MG: 40 INJECTION, SUSPENSION INTRA-ARTICULAR; INTRAMUSCULAR at 09:05

## 2023-05-18 NOTE — PROGRESS NOTES
RHEUMATOLOGY OUTPATIENT CLINIC NOTE    5/18/2023    Attending Rheumatologist: Juan Naranjo  Primary Care Provider: Effie Olmedo MD   Physician Requesting Consultation: No referring provider defined for this encounter.  Chief Complaint/Reason For Consultation:  Gout      Subjective:       HPI  Jessica Floyd is a 72 y.o. White female with medical history noted below who presents for evaluation of joint pain.   Patient presents for evaluation of joint pain. She notes chronic joint pain over many years with progression over the last year. She notes recent trauma to her left hand when trying to save her  from falling from a chair. She also notes paraesthesias of the right hand. She also notes her first episode of gout in her left toe. Unclear precipitant. No Hx of Stones. No FHX of Gout. She notes joint pain in her ankles, knees, back. She notes that activity precipitates the pain. Rest and Tylenol Helps. Occasional wrist swelling. Morning stiffness < 10 minutes. Has noted some wt loss since taking care of her 6yo grand daughter. Denies other systemic complaints.     Today  Patient here for follow up.   Last visit started on Gout management. Notes her ulnar side hurts would like CSI, otherwise denies further gout attacks, tolerating meds.    Review of Systems   Constitutional:  Negative for chills, fatigue, fever and unexpected weight change.   HENT:  Negative for mouth sores.    Eyes:  Negative for redness and eye dryness.   Respiratory:  Negative for cough and shortness of breath.    Cardiovascular:  Negative for chest pain.   Gastrointestinal:  Negative for abdominal distention, constipation, diarrhea, nausea and vomiting.   Genitourinary:  Negative for vaginal dryness.   Musculoskeletal:  Positive for arthralgias. Negative for back pain, gait problem, joint swelling, leg pain, myalgias, neck pain, neck stiffness and joint deformity.   Integumentary:  Negative for rash.   Neurological:  Negative  for weakness, numbness and headaches.   Hematological:  Negative for adenopathy. Does not bruise/bleed easily.   Psychiatric/Behavioral:  Negative for confusion, decreased concentration and sleep disturbance. The patient is not nervous/anxious.    All other systems reviewed and are negative.     Chronic comorbid conditions affecting medical decision making today:  Past Medical History:   Diagnosis Date    Allergic rhinitis     Amblyopia     Carotid stenosis     Coronary atherosclerosis     Outside [facility] [place] 2014: 20% mLAD, 50% mCx, 20%, mRCA    Dyslipidemia     ESBL (extended spectrum beta-lactamase) producing bacteria infection     UTI    Hypertension     Nausea and vomiting 2017    Subarachnoid hemorrhage due to ruptured aneurysm      Past Surgical History:   Procedure Laterality Date    ADENOIDECTOMY      ANTERIOR CRUCIATE LIGAMENT REPAIR      APPENDECTOMY      CATARACT EXTRACTION W/  INTRAOCULAR LENS IMPLANT Right 2022    Procedure: EXTRACTION, CATARACT, WITH IOL INSERTION;  Surgeon: Higinio Cristina MD;  Location: [facility];  Service: Ophthalmology;  Laterality: Right;    CATARACT EXTRACTION W/  INTRAOCULAR LENS IMPLANT Left 2022    Procedure: EXTRACTION, CATARACT, WITH IOL INSERTION;  Surgeon: Higinio Cristina MD;  Location: [facility];  Service: Ophthalmology;  Laterality: Left;    CEREBRAL ANEURYSM REPAIR      clip     SECTION      DENTAL SURGERY      EYE SURGERY Bilateral     cataract removal and lens implants    TONSILLECTOMY       Family History   Problem Relation Age of Onset    Hypertension Mother     Aneurysm Mother     Hypertension Father     Alcohol abuse Father     Kidney disease Brother     Heart disease Brother     Gout Brother     No Known Problems Daughter     No Known Problems Daughter     No Known Problems Daughter      Social History     Substance and Sexual Activity   Alcohol Use Yes    Alcohol/week: 7.0 standard drinks    Types: 7 Glasses of wine per week     Social  History     Tobacco Use   Smoking Status Former    Packs/day: 0.50    Years: 20.00    Pack years: 10.00    Types: Cigarettes    Quit date: 1999    Years since quittin.3    Passive exposure: Past   Smokeless Tobacco Never     Social History     Substance and Sexual Activity   Drug Use No       Current Outpatient Medications:     amLODIPine (NORVASC) 10 MG tablet, Take 1 tablet (10 mg total) by mouth once daily., Disp: 90 tablet, Rfl: 3    atorvastatin (LIPITOR) 80 MG tablet, TAKE 1 TABLET BY MOUTH EVERY DAY, Disp: 90 tablet, Rfl: 3    carvediloL (COREG) 12.5 MG tablet, Take 1 tablet (12.5 mg total) by mouth 2 (two) times daily with meals., Disp: 180 tablet, Rfl: 3    fluticasone propionate (FLONASE) 50 mcg/actuation nasal spray, SPRAY ONCE INTO EACH NOSTRIL ONCE DAILY, Disp: 16 mL, Rfl: 3    hydroCHLOROthiazide (HYDRODIURIL) 25 MG tablet, TAKE 1 TABLET BY MOUTH EVERY DAY, Disp: 90 tablet, Rfl: 3    hydrocortisone 2.5 % ointment, as needed., Disp: , Rfl:     ibuprofen (ADVIL,MOTRIN) 600 MG tablet, Take 1 tablet (600 mg total) by mouth every 6 (six) hours as needed for Pain., Disp: 20 tablet, Rfl: 0    ketoconazole (NIZORAL) 2 % cream, APPLY BETWEEN TOES TWICE DAILY X 2 WKS, Disp: , Rfl:     LIDOcaine (LIDODERM) 5 %, Place 1 patch onto the skin once daily. Remove & Discard patch within 12 hours or as directed by MD, Disp: 15 patch, Rfl: 0    montelukast (SINGULAIR) 10 mg tablet, Take 1 tablet (10 mg total) by mouth every evening., Disp: 90 tablet, Rfl: 6    pantoprazole (PROTONIX) 40 MG tablet, TAKE 1 TABLET BY MOUTH EVERY DAY, Disp: 90 tablet, Rfl: 0    allopurinoL (ZYLOPRIM) 100 MG tablet, Take 2 tablets (200 mg total) by mouth once daily., Disp: 60 tablet, Rfl: 11    colchicine (MITIGARE) 0.6 mg Cap, Take 1 capsule (0.6 mg total) by mouth once daily., Disp: 30 capsule, Rfl: 11  No current facility-administered medications for this visit.    Facility-Administered Medications Ordered in Other Visits:      sodium hyaluronate (EUFLEXXA) 10 mg/mL(mw 2.4 -3.6 million) injection 20 mg, 20 mg, Intra-articular, , Armani Cai PA-C, 20 mg at 11/14/22 0830     Objective:         Vitals:    05/18/23 0940   BP: (!) 148/83   Pulse: 81     Physical Exam   Musculoskeletal:      Comments: Can make fist, no synovitis  Right 1st CMC TTP  Heberden and malissa nodes  Knee crepitus  Negative ankle/MTP with hallux valgus right side  No tender points      Reviewed old and all outside pertinent medical records available.    All lab results personally reviewed and interpreted by me.  Lab Results   Component Value Date    WBC 7.46 03/28/2023    HGB 8.9 (L) 03/28/2023    HCT 28.8 (L) 03/28/2023    MCV 89 03/28/2023    MCH 27.6 03/28/2023    MCHC 30.9 (L) 03/28/2023    RDW 17.3 (H) 03/28/2023     03/28/2023    MPV 11.2 03/28/2023       Lab Results   Component Value Date     02/28/2023    K 3.6 02/28/2023    CL 98 02/28/2023    CO2 30 (H) 02/28/2023    GLU 93 02/28/2023    BUN 10 02/28/2023    CALCIUM 9.1 02/28/2023    PROT 6.7 02/28/2023    ALBUMIN 3.6 02/28/2023    BILITOT 0.5 02/28/2023    AST 45 (H) 02/28/2023    ALKPHOS 69 02/28/2023    ALT 25 02/28/2023       Lab Results   Component Value Date    COLORU Yellow 03/20/2017    APPEARANCEUA Clear 03/20/2017    SPECGRAV 1.010 03/20/2017    PHUR 6.0 03/20/2017    PROTEINUA Negative 03/20/2017    KETONESU Negative 03/20/2017    LEUKOCYTESUR Trace (A) 03/20/2017    NITRITE Negative 03/20/2017    UROBILINOGEN Negative 03/20/2017       Lab Results   Component Value Date    CRP 13.0 (H) 06/15/2022       Lab Results   Component Value Date    SEDRATE 46 (H) 06/15/2022       Lab Results   Component Value Date    RF <13.0 05/11/2022    SEDRATE 46 (H) 06/15/2022       No components found for: 25OHVITDTOT, 94VNVCME1, 20FDMDRR0, METHODNOTE    Lab Results   Component Value Date    URICACID 6.6 (H) 03/28/2023       No components found for: TSPOTTB        Imaging:  All imaging reviewed and  independently interpreted by me.         ASSESSMENT / PLAN:     Jessica Floyd is a 72 y.o. White female with:      1. Chronic gout without tophus, unspecified cause, unspecified site  - patient with recent Gout attack, +CKD3, no Stones or FHX, unknown triggers  - doing well   - last UA 6.6, will increase allopurinol to 200mg daily  - continued Colchicine 0.6mg PPX  - gout diet reinforced  - update labs   - reassurance    2. Primary osteoarthritis involving multiple joints  - in addition to Gout she has OA  - stable  - CSI to right Ulnar Carpal area, see procedure note, post procedure precautions discussed   - Tylenol PRN  - discussed alternative therapies such as Epson salts, Paraffin Wax and Topical Voltaren  - reassurance and exercise     3. Stage 3a chronic kidney disease  - noted  - renally dosed meds     4. Encounter for long-term (current) use of other medications  - update labs     5. Other specified counseling  - over 10 minutes spent regarding below topics:  - Immunization counseling done.  - Weight loss counseling done.  - Nutrition and exercise counseling.  - Limitation of alcohol consumption.  - Regular exercise:  Aerobic and resistance.  - Medication counseling provided.      Follow up in about 3 months (around 8/18/2023).    Method of contact with patient concerns: [facility]    Disclaimer:  This note is prepared using voice recognition software and as such is likely to have errors and has not been proof read. Please contact me for questions.     Time spent: 30 minutes in face to face discussion concerning diagnosis, prognosis, review of lab and test results, benefits of treatment as well as management of disease, counseling of patient and coordination of care between various health care providers.  Greater than half the time spent was used for coordination of care and counseling of patient.    Juan Naranjo M.D.  Rheumatology Department   [facility]

## 2023-05-18 NOTE — PROCEDURES
Carpal Tunnel    Date/Time: 5/18/2023 9:00 AM  Performed by: Juan Naranjo MD  Authorized by: Juan Naranjo MD     Consent Done?:  Yes (Verbal)  Indications:  Pain  Site marked: the procedure site was marked    Timeout: prior to procedure the correct patient, procedure, and site was verified    Prep: patient was prepped and draped in usual sterile fashion      Local anesthesia used?: No    Location:  Wrist  Site:  R intercarpal  Ultrasonic Guidance for Needle Placement?: No    Needle size:  25 G  Approach:  Anterolateral  Medications:  1 mL LIDOcaine HCL 10 mg/ml (1%) 10 mg/mL (1 %); 40 mg triamcinolone acetonide 40 mg/mL  Patient tolerance:  Patient tolerated the procedure well with no immediate complications

## 2023-05-21 DIAGNOSIS — K21.9 GASTROESOPHAGEAL REFLUX DISEASE WITHOUT ESOPHAGITIS: ICD-10-CM

## 2023-05-22 ENCOUNTER — INFUSION (OUTPATIENT)
Dept: INFUSION THERAPY | Facility: OTHER | Age: 73
End: 2023-05-22
Attending: STUDENT IN AN ORGANIZED HEALTH CARE EDUCATION/TRAINING PROGRAM
Payer: MEDICARE

## 2023-05-22 VITALS
HEART RATE: 81 BPM | RESPIRATION RATE: 17 BRPM | DIASTOLIC BLOOD PRESSURE: 80 MMHG | SYSTOLIC BLOOD PRESSURE: 155 MMHG | OXYGEN SATURATION: 95 % | TEMPERATURE: 99 F

## 2023-05-22 DIAGNOSIS — D50.0 IRON DEFICIENCY ANEMIA SECONDARY TO BLOOD LOSS (CHRONIC): Primary | ICD-10-CM

## 2023-05-22 PROCEDURE — 25000003 PHARM REV CODE 250: Performed by: STUDENT IN AN ORGANIZED HEALTH CARE EDUCATION/TRAINING PROGRAM

## 2023-05-22 PROCEDURE — 96365 THER/PROPH/DIAG IV INF INIT: CPT

## 2023-05-22 PROCEDURE — 63600175 PHARM REV CODE 636 W HCPCS: Performed by: STUDENT IN AN ORGANIZED HEALTH CARE EDUCATION/TRAINING PROGRAM

## 2023-05-22 RX ORDER — METHYLPREDNISOLONE SOD SUCC 125 MG
125 VIAL (EA) INJECTION ONCE AS NEEDED
Status: DISCONTINUED | OUTPATIENT
Start: 2023-05-22 | End: 2023-05-22 | Stop reason: HOSPADM

## 2023-05-22 RX ORDER — DIPHENHYDRAMINE HYDROCHLORIDE 50 MG/ML
50 INJECTION INTRAMUSCULAR; INTRAVENOUS ONCE AS NEEDED
Status: CANCELLED | OUTPATIENT
Start: 2023-05-29

## 2023-05-22 RX ORDER — HEPARIN 100 UNIT/ML
500 SYRINGE INTRAVENOUS
Status: CANCELLED | OUTPATIENT
Start: 2023-05-29

## 2023-05-22 RX ORDER — HEPARIN 100 UNIT/ML
500 SYRINGE INTRAVENOUS
Status: DISCONTINUED | OUTPATIENT
Start: 2023-05-22 | End: 2023-05-22 | Stop reason: HOSPADM

## 2023-05-22 RX ORDER — SODIUM CHLORIDE 0.9 % (FLUSH) 0.9 %
10 SYRINGE (ML) INJECTION
Status: CANCELLED | OUTPATIENT
Start: 2023-05-29

## 2023-05-22 RX ORDER — EPINEPHRINE 0.3 MG/.3ML
0.3 INJECTION SUBCUTANEOUS ONCE AS NEEDED
Status: CANCELLED | OUTPATIENT
Start: 2023-05-29

## 2023-05-22 RX ORDER — PANTOPRAZOLE SODIUM 40 MG/1
TABLET, DELAYED RELEASE ORAL
Qty: 90 TABLET | Refills: 0 | Status: SHIPPED | OUTPATIENT
Start: 2023-05-22 | End: 2023-09-05

## 2023-05-22 RX ORDER — SODIUM CHLORIDE 0.9 % (FLUSH) 0.9 %
10 SYRINGE (ML) INJECTION
Status: DISCONTINUED | OUTPATIENT
Start: 2023-05-22 | End: 2023-05-22 | Stop reason: HOSPADM

## 2023-05-22 RX ORDER — DIPHENHYDRAMINE HYDROCHLORIDE 50 MG/ML
50 INJECTION INTRAMUSCULAR; INTRAVENOUS ONCE AS NEEDED
Status: DISCONTINUED | OUTPATIENT
Start: 2023-05-22 | End: 2023-05-22 | Stop reason: HOSPADM

## 2023-05-22 RX ORDER — EPINEPHRINE 0.3 MG/.3ML
0.3 INJECTION SUBCUTANEOUS ONCE AS NEEDED
Status: DISCONTINUED | OUTPATIENT
Start: 2023-05-22 | End: 2023-05-22 | Stop reason: HOSPADM

## 2023-05-22 RX ORDER — METHYLPREDNISOLONE SOD SUCC 125 MG
125 VIAL (EA) INJECTION ONCE AS NEEDED
Status: CANCELLED | OUTPATIENT
Start: 2023-05-29

## 2023-05-22 RX ADMIN — SODIUM CHLORIDE: 9 INJECTION, SOLUTION INTRAVENOUS at 08:05

## 2023-05-22 RX ADMIN — IRON SUCROSE 200 MG: 20 INJECTION, SOLUTION INTRAVENOUS at 08:05

## 2023-05-22 NOTE — PLAN OF CARE
Vital Signs Stable, No apparent distress noted; Pt tolerated _Venofer w/o difficulty.  AVS/Discharge instructions given/all questions answered;Pt verbalizes understanding.   Next appointment scheduled and sent via [employer];ambulated from clinic in [place]

## 2023-05-24 NOTE — PROGRESS NOTES
[facility]   Physical Therapy Daily Treatment Note     Name: Jessica Floyd  Clinic Number: [id number]    Therapy Diagnosis:   Encounter Diagnoses   Name Primary?    Traumatic complete tear of right rotator cuff, initial encounter      Localized primary osteoarthritis of right shoulder region      Physician: Armani Cai PA-C    Visit Date: 5/25/2023    Physician Orders: Physical Therapy Evaluate and Treat  Medical Diagnosis from Referral: Traumatic complete tear of right rotator cuff, initial encounter (S46.011A); Localized primary osteoarthritis of right shoulder region (M19.011 )  Evaluation Date: 2/15/2023                 Authorization Period Expiration: 2/6/2024  Progress Note Due: 6/1/2023  Plan of Care Expiration: 4/6/2023 to 06/30/2023  Visit # / Visits authorized: 21/40 (+eval)   FOTO: 5/1/2023 (4/5)  PTA Visit #: 0/5    Precautions: Standard    Time In: 8:21 am (late arrival)   Time Out: 9:05 am      Total Billable Time: 25 minutes      SUBJECTIVE   Pt reports: she had a stomach bug the last week or so and had to cancel her visits.  She wishes she could do the taping herself because it seems to really help.         She was not compliant with home exercise program.  Response to previous treatment: increased pain  Functional change: None today    Pain: 3/10   Location: R shoulder     OBJECTIVE      Objective measures updated at progress report unless otherwise noted.      TREATMENT     Jessica received the bolded treatments listed below:      Patient received therapeutic exercises for 15 minutes for improved strength and AROM including:  Pulleys flexion x3 minutes  Seated Rows with RTB 3x 10   Seated extension with RTB 3x 10  Standing table slides with ball flexion/scaption x30 each      Not performed today:  + ball roll up HOB incline x3 minutes  Bicep curl with YTB x30 on right   Tricep extension with RTB x30 on right  Shoulder isometrics flex/INTERNAL ROTATION/ER/ABD 10x 10  "second hold   PROM flex/scaption/abd/ER/IR  Seated thoracic extension with 1/2 foam 2x 15 with 5 second holds   Supine with HOB elevated AAROM with dowel 3x 10   Seated IR with towel roll to neutral with RTB 3x 10  Seated ER with towel roll to neutral with RTB 3x 10  Table slides flx/abd x20 ea  AAROM into flexion with dowel x10 - stopped due to pain  Serratus punches x15 with dowel   Palloff press in sitting with double red theratube x10 bilateral   Shoulder pendulum circles x20 ea     Table circles x20   Wall slides x20  Ball press IR/ER iso with pilates ring 3" hold x20    Shoulder pendulum hang 2'    Patient received manual therapeutic technique for 8 minutes for improved soft tissue and joint mobility including:  Taping to improve anterior shoulder positioning and decompression strip over AC joint on right.  Education regarding purpose of tape and removal provided with patient verbalizing understanding.   Shoulder PROM flexion/ABD/ER  A/P and inferior GHJ mobilizations grade I-II with PROM  Oscillations with PROM for relaxation  STM to global shoulder  PROM R shoulder  Grade 1-2 joint glides in all directions for decreased muscle guarding and pain   STM to R shoulder    Patient received neuromuscular reeducation for 11 minutes for improved proprioception including:  HOB elevated ER/IR perturbations through ranges with YTB and ice x5 minutes  Ball press into mat with yellow physioball flex/scaption x30; circles CW/CCW x30 each    Patient received therapeutic activities for 00 minutes for improved tolerance to functional activities including:      Jessica received hot pack for 10 minutes to increase circulation and promote tissue healing prior to exercises.    Jessica received cold pack for 00 minutes to to decrease circulation, pain, and swelling after exercises.      PATIENT EDUCATION AND HOME EXERCISES     Home Exercises Provided and Patient Education Provided     Education provided:   PT educated pt on " importance of compliance with their HEP this visit.     Written Home Exercises Provided: Patient instructed to cont prior HEP. Exercises were reviewed and Jessica was able to demonstrate them prior to the end of the session.  Jessica demonstrated fair  understanding of the education provided. See EMR under Patient Instructions for exercises provided during therapy sessions    ASSESSMENT   Limited treatment due to patient arriving late and having to leave on time due to another appointment at 9:15 am.  Patient has not been to therapy in the last few weeks due to illness.  She is feeling better but her shoulder is really bothering her now.  Re-taped shoulder with added decompression strip over AC joint for additional pain relief and shoulder stability.  Continued with gentle RANGE OF MOTION and strengthening as tolerated.  Re-assessment at next visit.     Jessica Is slowly progressing well towards her goals.   Pt prognosis is Fair.     Pt will continue to benefit from skilled outpatient physical therapy to address the deficits listed in the problem list box on initial evaluation, provide pt/family education and to maximize pt's level of independence in the home and community environment.     Pt's spiritual, cultural and educational needs considered and pt agreeable to plan of care and goals.     Anticipated barriers to physical therapy: None    Goals:    Short Term Goals:  4 weeks  1.Report decreased R shoulder pain pain < / =  6/10  to increase tolerance for reaching MET  2. Increase PROM to 120 degrees R shoulder flexion MET  3. Increased strength by 1/3 MMT grade in R shoulder abduction to increase tolerance for ADL and work activities. (progressing, not met)  4. Pt to tolerate HEP to improve ROM and independence with ACTIVITIES OF DAILY LIVING's MET     Long Term Goals: 8 weeks  1.Report decreased R shoulder pain  < / =  3 /10  to increase tolerance for lifting (progressing, not met)  2.Increase AROM to 150 deg R  shoulder flexion (progressing, not met)  3.Increase strength to >/= 4/5 in R shoulder abduction to increase tolerance for ADL and work activities. (progressing, not met)  4. Pt goal: perform ADLs without shoulder pain. (progressing, not met)  5. Pt will have improved gcode of CJ (20-40% limited) on FOTO shoulder in order to demonstrate true functional improvement. (progressing, not met)    PLAN     Improve R shoulder ROM and strength     Updated Certification Period: 4/6/2023 to 06/30/2023  Recommended Treatment Plan: 2 times per week for 12 weeks: Manual Therapy, Moist Heat/ Ice, Neuromuscular Re-ed, Patient Education, Therapeutic Activities, and Therapeutic Exercise      Leila Hairston, PT

## 2023-05-25 ENCOUNTER — CLINICAL SUPPORT (OUTPATIENT)
Dept: REHABILITATION | Facility: OTHER | Age: 73
End: 2023-05-25
Payer: MEDICARE

## 2023-05-25 DIAGNOSIS — S46.011A TRAUMATIC COMPLETE TEAR OF RIGHT ROTATOR CUFF, INITIAL ENCOUNTER: ICD-10-CM

## 2023-05-25 DIAGNOSIS — M19.011 LOCALIZED PRIMARY OSTEOARTHRITIS OF RIGHT SHOULDER REGION: Primary | ICD-10-CM

## 2023-05-25 PROCEDURE — 97112 NEUROMUSCULAR REEDUCATION: CPT | Mod: PN

## 2023-05-25 PROCEDURE — 97140 MANUAL THERAPY 1/> REGIONS: CPT | Mod: PN

## 2023-05-26 ENCOUNTER — HOSPITAL ENCOUNTER (INPATIENT)
Facility: HOSPITAL | Age: 73
LOS: 5 days | Discharge: NURSING, REHAB OR LONG-TERM CARE | DRG: 522 | End: 2023-05-31
Attending: EMERGENCY MEDICINE | Admitting: HOSPITALIST
Payer: MEDICARE

## 2023-05-26 DIAGNOSIS — K21.9 GASTROESOPHAGEAL REFLUX DISEASE WITHOUT ESOPHAGITIS: ICD-10-CM

## 2023-05-26 DIAGNOSIS — S72.002A LEFT DISPLACED FEMORAL NECK FRACTURE: Primary | ICD-10-CM

## 2023-05-26 DIAGNOSIS — M79.606 LEG PAIN: ICD-10-CM

## 2023-05-26 DIAGNOSIS — R09.02 HYPOXIA: ICD-10-CM

## 2023-05-26 DIAGNOSIS — S72.009A HIP FRACTURE: ICD-10-CM

## 2023-05-26 DIAGNOSIS — Z01.818 PRE-OP EXAM: ICD-10-CM

## 2023-05-26 DIAGNOSIS — M25.579 ANKLE PAIN: ICD-10-CM

## 2023-05-26 LAB
ABO + RH BLD: NORMAL
ALBUMIN SERPL BCP-MCNC: 3.9 G/DL (ref 3.5–5.2)
ALP SERPL-CCNC: 53 U/L (ref 55–135)
ALT SERPL W/O P-5'-P-CCNC: 34 U/L (ref 10–44)
ANION GAP SERPL CALC-SCNC: 15 MMOL/L (ref 8–16)
AST SERPL-CCNC: 44 U/L (ref 10–40)
BASOPHILS # BLD AUTO: 0.06 K/UL (ref 0–0.2)
BASOPHILS NFR BLD: 0.4 % (ref 0–1.9)
BILIRUB SERPL-MCNC: 0.7 MG/DL (ref 0.1–1)
BLD GP AB SCN CELLS X3 SERPL QL: NORMAL
BUN SERPL-MCNC: 18 MG/DL (ref 8–23)
CALCIUM SERPL-MCNC: 8.5 MG/DL (ref 8.7–10.5)
CHLORIDE SERPL-SCNC: 95 MMOL/L (ref 95–110)
CO2 SERPL-SCNC: 23 MMOL/L (ref 23–29)
CREAT SERPL-MCNC: 0.9 MG/DL (ref 0.5–1.4)
DIFFERENTIAL METHOD: ABNORMAL
EOSINOPHIL # BLD AUTO: 0.3 K/UL (ref 0–0.5)
EOSINOPHIL NFR BLD: 1.8 % (ref 0–8)
ERYTHROCYTE [DISTWIDTH] IN BLOOD BY AUTOMATED COUNT: 23.9 % (ref 11.5–14.5)
EST. GFR  (NO RACE VARIABLE): >60 ML/MIN/1.73 M^2
GLUCOSE SERPL-MCNC: 137 MG/DL (ref 70–110)
HCT VFR BLD AUTO: 34 % (ref 37–48.5)
HCV AB SERPL QL IA: NORMAL
HGB BLD-MCNC: 11.1 G/DL (ref 12–16)
HIV 1+2 AB+HIV1 P24 AG SERPL QL IA: NORMAL
IMM GRANULOCYTES # BLD AUTO: 0.14 K/UL (ref 0–0.04)
IMM GRANULOCYTES NFR BLD AUTO: 1 % (ref 0–0.5)
INR PPP: 1 (ref 0.8–1.2)
LYMPHOCYTES # BLD AUTO: 1.2 K/UL (ref 1–4.8)
LYMPHOCYTES NFR BLD: 8.4 % (ref 18–48)
MAGNESIUM SERPL-MCNC: 1.4 MG/DL (ref 1.6–2.6)
MCH RBC QN AUTO: 30.5 PG (ref 27–31)
MCHC RBC AUTO-ENTMCNC: 32.6 G/DL (ref 32–36)
MCV RBC AUTO: 93 FL (ref 82–98)
MONOCYTES # BLD AUTO: 1.3 K/UL (ref 0.3–1)
MONOCYTES NFR BLD: 9 % (ref 4–15)
NEUTROPHILS # BLD AUTO: 11.3 K/UL (ref 1.8–7.7)
NEUTROPHILS NFR BLD: 79.4 % (ref 38–73)
NRBC BLD-RTO: 0 /100 WBC
PHOSPHATE SERPL-MCNC: 2.2 MG/DL (ref 2.7–4.5)
PLATELET # BLD AUTO: 262 K/UL (ref 150–450)
PMV BLD AUTO: 10.7 FL (ref 9.2–12.9)
POTASSIUM SERPL-SCNC: 2.4 MMOL/L (ref 3.5–5.1)
PROT SERPL-MCNC: 6.9 G/DL (ref 6–8.4)
PROTHROMBIN TIME: 10.3 SEC (ref 9–12.5)
RBC # BLD AUTO: 3.64 M/UL (ref 4–5.4)
SODIUM SERPL-SCNC: 133 MMOL/L (ref 136–145)
SPECIMEN OUTDATE: NORMAL
TRANSFERRIN SERPL-MCNC: 286 MG/DL (ref 200–375)
TROPONIN I SERPL DL<=0.01 NG/ML-MCNC: 0.02 NG/ML (ref 0–0.03)
WBC # BLD AUTO: 14.26 K/UL (ref 3.9–12.7)

## 2023-05-26 PROCEDURE — 99223 1ST HOSP IP/OBS HIGH 75: CPT | Mod: ,,, | Performed by: HOSPITALIST

## 2023-05-26 PROCEDURE — 63600175 PHARM REV CODE 636 W HCPCS

## 2023-05-26 PROCEDURE — 83036 HEMOGLOBIN GLYCOSYLATED A1C: CPT | Performed by: EMERGENCY MEDICINE

## 2023-05-26 PROCEDURE — 82306 VITAMIN D 25 HYDROXY: CPT | Performed by: STUDENT IN AN ORGANIZED HEALTH CARE EDUCATION/TRAINING PROGRAM

## 2023-05-26 PROCEDURE — 96375 TX/PRO/DX INJ NEW DRUG ADDON: CPT

## 2023-05-26 PROCEDURE — 12000002 HC ACUTE/MED SURGE SEMI-PRIVATE ROOM

## 2023-05-26 PROCEDURE — 84145 PROCALCITONIN (PCT): CPT | Performed by: EMERGENCY MEDICINE

## 2023-05-26 PROCEDURE — 86920 COMPATIBILITY TEST SPIN: CPT | Performed by: STUDENT IN AN ORGANIZED HEALTH CARE EDUCATION/TRAINING PROGRAM

## 2023-05-26 PROCEDURE — 99285 PR EMERGENCY DEPT VISIT,LEVEL V: ICD-10-PCS | Mod: ,,, | Performed by: EMERGENCY MEDICINE

## 2023-05-26 PROCEDURE — 25000003 PHARM REV CODE 250

## 2023-05-26 PROCEDURE — 93005 ELECTROCARDIOGRAM TRACING: CPT

## 2023-05-26 PROCEDURE — 84134 ASSAY OF PREALBUMIN: CPT | Performed by: EMERGENCY MEDICINE

## 2023-05-26 PROCEDURE — 84484 ASSAY OF TROPONIN QUANT: CPT | Performed by: EMERGENCY MEDICINE

## 2023-05-26 PROCEDURE — 87389 HIV-1 AG W/HIV-1&-2 AB AG IA: CPT | Performed by: PHYSICIAN ASSISTANT

## 2023-05-26 PROCEDURE — 96365 THER/PROPH/DIAG IV INF INIT: CPT

## 2023-05-26 PROCEDURE — 84466 ASSAY OF TRANSFERRIN: CPT | Performed by: EMERGENCY MEDICINE

## 2023-05-26 PROCEDURE — 93010 EKG 12-LEAD: ICD-10-PCS | Mod: ,,, | Performed by: INTERNAL MEDICINE

## 2023-05-26 PROCEDURE — 85025 COMPLETE CBC W/AUTO DIFF WBC: CPT | Performed by: EMERGENCY MEDICINE

## 2023-05-26 PROCEDURE — 86803 HEPATITIS C AB TEST: CPT | Performed by: PHYSICIAN ASSISTANT

## 2023-05-26 PROCEDURE — 93010 ELECTROCARDIOGRAM REPORT: CPT | Mod: ,,, | Performed by: INTERNAL MEDICINE

## 2023-05-26 PROCEDURE — 99223 PR INITIAL HOSPITAL CARE,LEVL III: ICD-10-PCS | Mod: ,,, | Performed by: HOSPITALIST

## 2023-05-26 PROCEDURE — 84100 ASSAY OF PHOSPHORUS: CPT | Performed by: EMERGENCY MEDICINE

## 2023-05-26 PROCEDURE — 80053 COMPREHEN METABOLIC PANEL: CPT | Performed by: EMERGENCY MEDICINE

## 2023-05-26 PROCEDURE — 83735 ASSAY OF MAGNESIUM: CPT | Performed by: EMERGENCY MEDICINE

## 2023-05-26 PROCEDURE — 99285 EMERGENCY DEPT VISIT HI MDM: CPT | Mod: 25

## 2023-05-26 PROCEDURE — 85610 PROTHROMBIN TIME: CPT | Performed by: EMERGENCY MEDICINE

## 2023-05-26 PROCEDURE — 86900 BLOOD TYPING SEROLOGIC ABO: CPT | Performed by: EMERGENCY MEDICINE

## 2023-05-26 PROCEDURE — 99285 EMERGENCY DEPT VISIT HI MDM: CPT | Mod: ,,, | Performed by: EMERGENCY MEDICINE

## 2023-05-26 RX ORDER — FLUTICASONE PROPIONATE 50 MCG
2 SPRAY, SUSPENSION (ML) NASAL DAILY
Status: DISCONTINUED | OUTPATIENT
Start: 2023-05-27 | End: 2023-05-31 | Stop reason: HOSPADM

## 2023-05-26 RX ORDER — POLYETHYLENE GLYCOL 3350 17 G/17G
17 POWDER, FOR SOLUTION ORAL DAILY
Status: DISCONTINUED | OUTPATIENT
Start: 2023-05-28 | End: 2023-05-31 | Stop reason: HOSPADM

## 2023-05-26 RX ORDER — ONDANSETRON 2 MG/ML
4 INJECTION INTRAMUSCULAR; INTRAVENOUS
Status: COMPLETED | OUTPATIENT
Start: 2023-05-26 | End: 2023-05-26

## 2023-05-26 RX ORDER — MORPHINE SULFATE 4 MG/ML
4 INJECTION, SOLUTION INTRAMUSCULAR; INTRAVENOUS
Status: COMPLETED | OUTPATIENT
Start: 2023-05-26 | End: 2023-05-26

## 2023-05-26 RX ORDER — MAGNESIUM SULFATE HEPTAHYDRATE 40 MG/ML
2 INJECTION, SOLUTION INTRAVENOUS ONCE
Status: COMPLETED | OUTPATIENT
Start: 2023-05-26 | End: 2023-05-26

## 2023-05-26 RX ORDER — ALLOPURINOL 100 MG/1
200 TABLET ORAL DAILY
Status: DISCONTINUED | OUTPATIENT
Start: 2023-05-27 | End: 2023-05-31 | Stop reason: HOSPADM

## 2023-05-26 RX ORDER — KETOROLAC TROMETHAMINE 30 MG/ML
10 INJECTION, SOLUTION INTRAMUSCULAR; INTRAVENOUS
Status: COMPLETED | OUTPATIENT
Start: 2023-05-26 | End: 2023-05-26

## 2023-05-26 RX ORDER — FERROUS SULFATE, DRIED 160(50) MG
2 TABLET, EXTENDED RELEASE ORAL DAILY
Status: DISCONTINUED | OUTPATIENT
Start: 2023-05-27 | End: 2023-05-31 | Stop reason: HOSPADM

## 2023-05-26 RX ORDER — PREGABALIN 75 MG/1
75 CAPSULE ORAL NIGHTLY
Status: DISCONTINUED | OUTPATIENT
Start: 2023-05-27 | End: 2023-05-31 | Stop reason: HOSPADM

## 2023-05-26 RX ORDER — AMLODIPINE BESYLATE 5 MG/1
10 TABLET ORAL DAILY
Status: DISCONTINUED | OUTPATIENT
Start: 2023-05-27 | End: 2023-05-31 | Stop reason: HOSPADM

## 2023-05-26 RX ORDER — POLYETHYLENE GLYCOL 3350 17 G/17G
17 POWDER, FOR SOLUTION ORAL DAILY
Status: DISCONTINUED | OUTPATIENT
Start: 2023-05-27 | End: 2023-05-26

## 2023-05-26 RX ORDER — TALC
6 POWDER (GRAM) TOPICAL NIGHTLY PRN
Status: DISCONTINUED | OUTPATIENT
Start: 2023-05-27 | End: 2023-05-31 | Stop reason: HOSPADM

## 2023-05-26 RX ORDER — ATORVASTATIN CALCIUM 40 MG/1
80 TABLET, FILM COATED ORAL DAILY
Status: DISCONTINUED | OUTPATIENT
Start: 2023-05-27 | End: 2023-05-31 | Stop reason: HOSPADM

## 2023-05-26 RX ORDER — OXYCODONE HYDROCHLORIDE 5 MG/1
5 TABLET ORAL EVERY 4 HOURS PRN
Status: DISCONTINUED | OUTPATIENT
Start: 2023-05-27 | End: 2023-05-31 | Stop reason: HOSPADM

## 2023-05-26 RX ORDER — PANTOPRAZOLE SODIUM 40 MG/1
40 TABLET, DELAYED RELEASE ORAL DAILY
Status: DISCONTINUED | OUTPATIENT
Start: 2023-05-27 | End: 2023-05-31 | Stop reason: HOSPADM

## 2023-05-26 RX ORDER — METHOCARBAMOL 500 MG/1
500 TABLET, FILM COATED ORAL EVERY 6 HOURS PRN
Status: DISCONTINUED | OUTPATIENT
Start: 2023-05-27 | End: 2023-05-28

## 2023-05-26 RX ORDER — CARVEDILOL 12.5 MG/1
12.5 TABLET ORAL 2 TIMES DAILY WITH MEALS
Status: DISCONTINUED | OUTPATIENT
Start: 2023-05-27 | End: 2023-05-31 | Stop reason: HOSPADM

## 2023-05-26 RX ORDER — PANTOPRAZOLE SODIUM 40 MG/1
TABLET, DELAYED RELEASE ORAL
Qty: 90 TABLET | Refills: 0 | OUTPATIENT
Start: 2023-05-26

## 2023-05-26 RX ORDER — ONDANSETRON 2 MG/ML
4 INJECTION INTRAMUSCULAR; INTRAVENOUS EVERY 12 HOURS PRN
Status: DISCONTINUED | OUTPATIENT
Start: 2023-05-27 | End: 2023-05-31 | Stop reason: HOSPADM

## 2023-05-26 RX ORDER — MORPHINE SULFATE 2 MG/ML
2 INJECTION, SOLUTION INTRAMUSCULAR; INTRAVENOUS
Status: DISCONTINUED | OUTPATIENT
Start: 2023-05-27 | End: 2023-05-31 | Stop reason: HOSPADM

## 2023-05-26 RX ORDER — ONDANSETRON 4 MG/1
4 TABLET, ORALLY DISINTEGRATING ORAL
Status: DISCONTINUED | OUTPATIENT
Start: 2023-05-26 | End: 2023-05-26

## 2023-05-26 RX ORDER — IPRATROPIUM BROMIDE AND ALBUTEROL SULFATE 2.5; .5 MG/3ML; MG/3ML
3 SOLUTION RESPIRATORY (INHALATION) EVERY 4 HOURS PRN
Status: DISCONTINUED | OUTPATIENT
Start: 2023-05-27 | End: 2023-05-31 | Stop reason: HOSPADM

## 2023-05-26 RX ORDER — CHOLECALCIFEROL (VITAMIN D3) 25 MCG
2000 TABLET ORAL DAILY
Status: DISCONTINUED | OUTPATIENT
Start: 2023-05-27 | End: 2023-05-31 | Stop reason: HOSPADM

## 2023-05-26 RX ORDER — DEXTROSE MONOHYDRATE, SODIUM CHLORIDE, AND POTASSIUM CHLORIDE 50; 1.49; 4.5 G/1000ML; G/1000ML; G/1000ML
INJECTION, SOLUTION INTRAVENOUS CONTINUOUS
Status: DISCONTINUED | OUTPATIENT
Start: 2023-05-27 | End: 2023-05-30

## 2023-05-26 RX ORDER — SODIUM CHLORIDE 9 MG/ML
INJECTION, SOLUTION INTRAVENOUS CONTINUOUS
Status: DISCONTINUED | OUTPATIENT
Start: 2023-05-26 | End: 2023-05-26

## 2023-05-26 RX ORDER — SODIUM CHLORIDE 0.9 % (FLUSH) 0.9 %
10 SYRINGE (ML) INJECTION
Status: DISCONTINUED | OUTPATIENT
Start: 2023-05-27 | End: 2023-05-31 | Stop reason: HOSPADM

## 2023-05-26 RX ORDER — POTASSIUM CHLORIDE 20 MEQ/1
40 TABLET, EXTENDED RELEASE ORAL ONCE
Status: COMPLETED | OUTPATIENT
Start: 2023-05-26 | End: 2023-05-26

## 2023-05-26 RX ORDER — POTASSIUM CHLORIDE 7.45 MG/ML
10 INJECTION INTRAVENOUS
Status: COMPLETED | OUTPATIENT
Start: 2023-05-27 | End: 2023-05-27

## 2023-05-26 RX ORDER — ONDANSETRON 2 MG/ML
INJECTION INTRAMUSCULAR; INTRAVENOUS
Status: COMPLETED
Start: 2023-05-26 | End: 2023-05-26

## 2023-05-26 RX ORDER — BISACODYL 10 MG
10 SUPPOSITORY, RECTAL RECTAL DAILY PRN
Status: DISCONTINUED | OUTPATIENT
Start: 2023-05-27 | End: 2023-05-31 | Stop reason: HOSPADM

## 2023-05-26 RX ORDER — ACETAMINOPHEN 500 MG
1000 TABLET ORAL EVERY 8 HOURS
Status: COMPLETED | OUTPATIENT
Start: 2023-05-27 | End: 2023-05-27

## 2023-05-26 RX ORDER — MONTELUKAST SODIUM 10 MG/1
10 TABLET ORAL NIGHTLY
Status: DISCONTINUED | OUTPATIENT
Start: 2023-05-27 | End: 2023-05-31 | Stop reason: HOSPADM

## 2023-05-26 RX ADMIN — MAGNESIUM SULFATE 2 G: 2 INJECTION INTRAVENOUS at 10:05

## 2023-05-26 RX ADMIN — ONDANSETRON 4 MG: 2 INJECTION INTRAMUSCULAR; INTRAVENOUS at 09:05

## 2023-05-26 RX ADMIN — MORPHINE SULFATE 4 MG: 4 INJECTION INTRAVENOUS at 09:05

## 2023-05-26 RX ADMIN — KETOROLAC TROMETHAMINE 10 MG: 30 INJECTION, SOLUTION INTRAMUSCULAR; INTRAVENOUS at 10:05

## 2023-05-26 RX ADMIN — POTASSIUM CHLORIDE 40 MEQ: 1500 TABLET, EXTENDED RELEASE ORAL at 10:05

## 2023-05-26 RX ADMIN — SODIUM CHLORIDE 1000 ML: 0.9 INJECTION, SOLUTION INTRAVENOUS at 10:05

## 2023-05-26 NOTE — TELEPHONE ENCOUNTER
Care Due:                  Date            Visit Type   Department     Provider  --------------------------------------------------------------------------------                                EP -                              PRIMARY      [facility]  Last Visit: 03-      CARE ([facility])   FRANK Olmedo  Next Visit: None Scheduled  None         None Found                                                            Last  Test          Frequency    Reason                     Performed    Due Date  --------------------------------------------------------------------------------    Lipid Panel.  12 months..  atorvastatin.............  04- 03-    [facility] Embedded Care Due Messages. Reference number: [id number].   5/26/2023 3:23:47 PM CDT

## 2023-05-26 NOTE — TELEPHONE ENCOUNTER
Refill Decision Note   Jessica Floyd  is requesting a refill authorization.  Brief Assessment and Rationale for Refill:  Quick Discontinue     Medication Therapy Plan: Pharmacy is requesting new scripts for the following medications without required information, (sig/ frequency/qty/etc)     Medication Reconciliation Completed: No   Comments:     No Care Gaps recommended.     Note composed:4:14 PM 05/26/2023

## 2023-05-27 ENCOUNTER — ANESTHESIA EVENT (OUTPATIENT)
Dept: SURGERY | Facility: HOSPITAL | Age: 73
DRG: 522 | End: 2023-05-27
Payer: MEDICARE

## 2023-05-27 PROBLEM — E83.39 HYPOPHOSPHATEMIA: Status: ACTIVE | Noted: 2023-05-27

## 2023-05-27 PROBLEM — Z01.810 PREOP CARDIOVASCULAR EXAM: Status: ACTIVE | Noted: 2023-05-27

## 2023-05-27 PROBLEM — D72.829 LEUKOCYTOSIS: Status: ACTIVE | Noted: 2023-05-27

## 2023-05-27 PROBLEM — E83.42 HYPOMAGNESEMIA: Status: ACTIVE | Noted: 2023-05-27

## 2023-05-27 PROBLEM — S72.002A LEFT DISPLACED FEMORAL NECK FRACTURE: Status: ACTIVE | Noted: 2023-05-27

## 2023-05-27 LAB
25(OH)D3+25(OH)D2 SERPL-MCNC: 43 NG/ML (ref 30–96)
ANION GAP SERPL CALC-SCNC: 10 MMOL/L (ref 8–16)
BILIRUB UR QL STRIP: NEGATIVE
BUN SERPL-MCNC: 11 MG/DL (ref 8–23)
CALCIUM SERPL-MCNC: 8.3 MG/DL (ref 8.7–10.5)
CHLORIDE SERPL-SCNC: 99 MMOL/L (ref 95–110)
CLARITY UR REFRACT.AUTO: CLEAR
CO2 SERPL-SCNC: 30 MMOL/L (ref 23–29)
COLOR UR AUTO: YELLOW
CREAT SERPL-MCNC: 0.7 MG/DL (ref 0.5–1.4)
EST. GFR  (NO RACE VARIABLE): >60 ML/MIN/1.73 M^2
ESTIMATED AVG GLUCOSE: 94 MG/DL (ref 68–131)
GLUCOSE SERPL-MCNC: 107 MG/DL (ref 70–110)
GLUCOSE UR QL STRIP: NEGATIVE
HBA1C MFR BLD: 4.9 % (ref 4–5.6)
HGB UR QL STRIP: NEGATIVE
KETONES UR QL STRIP: ABNORMAL
LEUKOCYTE ESTERASE UR QL STRIP: NEGATIVE
MAGNESIUM SERPL-MCNC: 1.8 MG/DL (ref 1.6–2.6)
NITRITE UR QL STRIP: NEGATIVE
PH UR STRIP: 6 [PH] (ref 5–8)
POTASSIUM SERPL-SCNC: 2.9 MMOL/L (ref 3.5–5.1)
PREALB SERPL-MCNC: 31 MG/DL (ref 20–43)
PROCALCITONIN SERPL IA-MCNC: 0.1 NG/ML
PROT UR QL STRIP: NEGATIVE
SODIUM SERPL-SCNC: 139 MMOL/L (ref 136–145)
SP GR UR STRIP: 1.01 (ref 1–1.03)
URN SPEC COLLECT METH UR: ABNORMAL

## 2023-05-27 PROCEDURE — 99233 SBSQ HOSP IP/OBS HIGH 50: CPT | Mod: ,,, | Performed by: HOSPITALIST

## 2023-05-27 PROCEDURE — 25000242 PHARM REV CODE 250 ALT 637 W/ HCPCS: Performed by: HOSPITALIST

## 2023-05-27 PROCEDURE — 36415 COLL VENOUS BLD VENIPUNCTURE: CPT | Performed by: HOSPITALIST

## 2023-05-27 PROCEDURE — 63600175 PHARM REV CODE 636 W HCPCS: Performed by: STUDENT IN AN ORGANIZED HEALTH CARE EDUCATION/TRAINING PROGRAM

## 2023-05-27 PROCEDURE — 81003 URINALYSIS AUTO W/O SCOPE: CPT | Performed by: STUDENT IN AN ORGANIZED HEALTH CARE EDUCATION/TRAINING PROGRAM

## 2023-05-27 PROCEDURE — 11000001 HC ACUTE MED/SURG PRIVATE ROOM

## 2023-05-27 PROCEDURE — 99233 PR SUBSEQUENT HOSPITAL CARE,LEVL III: ICD-10-PCS | Mod: ,,, | Performed by: HOSPITALIST

## 2023-05-27 PROCEDURE — 83735 ASSAY OF MAGNESIUM: CPT | Performed by: HOSPITALIST

## 2023-05-27 PROCEDURE — 25000003 PHARM REV CODE 250: Performed by: HOSPITALIST

## 2023-05-27 PROCEDURE — 25000003 PHARM REV CODE 250: Performed by: STUDENT IN AN ORGANIZED HEALTH CARE EDUCATION/TRAINING PROGRAM

## 2023-05-27 PROCEDURE — A4216 STERILE WATER/SALINE, 10 ML: HCPCS | Performed by: HOSPITALIST

## 2023-05-27 PROCEDURE — 80048 BASIC METABOLIC PNL TOTAL CA: CPT | Performed by: HOSPITALIST

## 2023-05-27 PROCEDURE — 63600175 PHARM REV CODE 636 W HCPCS: Performed by: HOSPITALIST

## 2023-05-27 RX ORDER — MONTELUKAST SODIUM 10 MG/1
10 TABLET ORAL NIGHTLY
Qty: 90 TABLET | Refills: 11 | Status: SHIPPED | OUTPATIENT
Start: 2023-05-27

## 2023-05-27 RX ORDER — LANOLIN ALCOHOL/MO/W.PET/CERES
400 CREAM (GRAM) TOPICAL ONCE
Status: COMPLETED | OUTPATIENT
Start: 2023-05-27 | End: 2023-05-27

## 2023-05-27 RX ORDER — POTASSIUM CHLORIDE 20 MEQ/1
40 TABLET, EXTENDED RELEASE ORAL ONCE
Status: COMPLETED | OUTPATIENT
Start: 2023-05-27 | End: 2023-05-27

## 2023-05-27 RX ORDER — SODIUM,POTASSIUM PHOSPHATES 280-250MG
2 POWDER IN PACKET (EA) ORAL ONCE
Status: COMPLETED | OUTPATIENT
Start: 2023-05-27 | End: 2023-05-27

## 2023-05-27 RX ORDER — HEPARIN SODIUM 5000 [USP'U]/ML
5000 INJECTION, SOLUTION INTRAVENOUS; SUBCUTANEOUS EVERY 8 HOURS
Status: DISCONTINUED | OUTPATIENT
Start: 2023-05-27 | End: 2023-05-28

## 2023-05-27 RX ADMIN — METHOCARBAMOL 500 MG: 500 TABLET ORAL at 01:05

## 2023-05-27 RX ADMIN — POTASSIUM & SODIUM PHOSPHATES POWDER PACK 280-160-250 MG 2 PACKET: 280-160-250 PACK at 04:05

## 2023-05-27 RX ADMIN — ALLOPURINOL 200 MG: 100 TABLET ORAL at 08:05

## 2023-05-27 RX ADMIN — ATORVASTATIN CALCIUM 80 MG: 40 TABLET, FILM COATED ORAL at 08:05

## 2023-05-27 RX ADMIN — ONDANSETRON 4 MG: 2 INJECTION INTRAMUSCULAR; INTRAVENOUS at 12:05

## 2023-05-27 RX ADMIN — POTASSIUM BICARBONATE 40 MEQ: 391 TABLET, EFFERVESCENT ORAL at 12:05

## 2023-05-27 RX ADMIN — AMLODIPINE BESYLATE 10 MG: 5 TABLET ORAL at 08:05

## 2023-05-27 RX ADMIN — CARVEDILOL 12.5 MG: 12.5 TABLET, FILM COATED ORAL at 08:05

## 2023-05-27 RX ADMIN — DEXTROSE, SODIUM CHLORIDE, AND POTASSIUM CHLORIDE: 5; .45; .15 INJECTION INTRAVENOUS at 09:05

## 2023-05-27 RX ADMIN — POTASSIUM CHLORIDE 40 MEQ: 1500 TABLET, EXTENDED RELEASE ORAL at 07:05

## 2023-05-27 RX ADMIN — METHOCARBAMOL 500 MG: 500 TABLET ORAL at 09:05

## 2023-05-27 RX ADMIN — Medication 2 TABLET: at 08:05

## 2023-05-27 RX ADMIN — HEPARIN SODIUM 5000 UNITS: 5000 INJECTION INTRAVENOUS; SUBCUTANEOUS at 09:05

## 2023-05-27 RX ADMIN — ACETAMINOPHEN 1000 MG: 325 TABLET ORAL at 05:05

## 2023-05-27 RX ADMIN — MORPHINE SULFATE 2 MG: 2 INJECTION, SOLUTION INTRAMUSCULAR; INTRAVENOUS at 10:05

## 2023-05-27 RX ADMIN — POTASSIUM CHLORIDE 10 MEQ: 7.46 INJECTION, SOLUTION INTRAVENOUS at 02:05

## 2023-05-27 RX ADMIN — DEXTROSE, SODIUM CHLORIDE, AND POTASSIUM CHLORIDE: 5; .45; .15 INJECTION INTRAVENOUS at 01:05

## 2023-05-27 RX ADMIN — OXYCODONE HYDROCHLORIDE 5 MG: 5 TABLET ORAL at 12:05

## 2023-05-27 RX ADMIN — CARVEDILOL 12.5 MG: 12.5 TABLET, FILM COATED ORAL at 05:05

## 2023-05-27 RX ADMIN — MONTELUKAST 10 MG: 10 TABLET, FILM COATED ORAL at 09:05

## 2023-05-27 RX ADMIN — MORPHINE SULFATE 2 MG: 2 INJECTION, SOLUTION INTRAMUSCULAR; INTRAVENOUS at 01:05

## 2023-05-27 RX ADMIN — MORPHINE SULFATE 2 MG: 2 INJECTION, SOLUTION INTRAMUSCULAR; INTRAVENOUS at 04:05

## 2023-05-27 RX ADMIN — ACETAMINOPHEN 1000 MG: 325 TABLET ORAL at 01:05

## 2023-05-27 RX ADMIN — ONDANSETRON 4 MG: 2 INJECTION INTRAMUSCULAR; INTRAVENOUS at 10:05

## 2023-05-27 RX ADMIN — PREGABALIN 75 MG: 75 CAPSULE ORAL at 09:05

## 2023-05-27 RX ADMIN — POTASSIUM CHLORIDE 10 MEQ: 7.46 INJECTION, SOLUTION INTRAVENOUS at 01:05

## 2023-05-27 RX ADMIN — HEPARIN SODIUM 5000 UNITS: 5000 INJECTION INTRAVENOUS; SUBCUTANEOUS at 01:05

## 2023-05-27 RX ADMIN — CHOLECALCIFEROL TAB 25 MCG (1000 UNIT) 2000 UNITS: 25 TAB at 08:05

## 2023-05-27 RX ADMIN — PANTOPRAZOLE SODIUM 40 MG: 40 TABLET, DELAYED RELEASE ORAL at 08:05

## 2023-05-27 RX ADMIN — Medication 10 ML: at 02:05

## 2023-05-27 RX ADMIN — ACETAMINOPHEN 1000 MG: 325 TABLET ORAL at 09:05

## 2023-05-27 RX ADMIN — ONDANSETRON 4 MG: 2 INJECTION INTRAMUSCULAR; INTRAVENOUS at 01:05

## 2023-05-27 RX ADMIN — Medication 400 MG: at 12:05

## 2023-05-27 RX ADMIN — OXYCODONE HYDROCHLORIDE 5 MG: 5 TABLET ORAL at 09:05

## 2023-05-27 RX ADMIN — FLUTICASONE PROPIONATE 100 MCG: 50 SPRAY, METERED NASAL at 08:05

## 2023-05-27 NOTE — H&P
[facility]  [facility] Medicine  History & Physical    Patient Name: Jessica Floyd  MRN: [id number]  Patient Class: IP- Inpatient  Admission Date: 5/26/2023  Attending Physician: Angela Nolan MD   Primary Care Provider: Effie Olmedo MD         Patient information was obtained from patient and ER records.     Subjective:     Principal Problem:Left displaced femoral neck fracture    Chief Complaint:   Chief Complaint   Patient presents with    Fall     Mechanical fall. Left leg short and rotated        HPI: Jessica Floyd is a 72 year old female with PMH of HTN, CAD, SAH 2/2 aneurysm s/p clipping, right shoulder DJD and rotator cuff tear, GERD who presented to ED via [facility] from home for evaluation of left hip and leg pain after a mechanical fall from standing height. Pt states that she was walking to bathroom and tripped over a folding table and fell to hardwood floor around 6 pm this evening. Patient landed on left hip with immediate severe left hip and leg pain. The fall witnessed by her  who called EMS as patient was unable to get up or bear any weight on left side. Patient denies hitting her head, LOC, bleeding on site, chest pain, palpitation, dizziness, sob, focal weakness or numbness associated with fall.  Pt given 55 mcg fentanyl IM by EMS prior to arrival. Imaging showed acute mildly displaced left femoral neck fracture. Patient seen by orthopedic with plan for surgical fixation.      ED course: afebrile and vitals stable. Imaging showed acute mildly displaced left femoral neck fracture. EKG NSR with Q waves in septal leads VI-VII, TWI V4-6 (old). Labs significant for WBC 14, K 2.4, Mg 1.4 and phos 2.2. Patient received morphine, zofran, toradol, 40 meq oral KCL, magnesium sulfate and 1L NS bolus in ED. During my interview patient is awake and her  is present at bedside. She reports severe muscle spasm and left hip pain with minimal movement. Patient states at  baseline she cane or walker sometime to avoid falls. She goes to physical therapy for degenerative R shoulder rotator cuff tear with improving strength and range of motion. She walks around the house with her 5 year old granddaughter without chest pain or dyspnea. She denies fever, chills, cough, N/V/D/abdominal pain, dysuria or hematuria. She denies taking blood thinner. She does not smoke, but drinks couple of glasses of wine with dinner.       Past Medical History:   Diagnosis Date    Allergic rhinitis     Amblyopia     Carotid stenosis     Coronary atherosclerosis     [facility] 2014: 20% mLAD, 50% mCx, 20%, mRCA    Dyslipidemia     ESBL (extended spectrum beta-lactamase) producing bacteria infection     UTI    Hypertension     Nausea and vomiting 2017    Subarachnoid hemorrhage due to ruptured aneurysm        Past Surgical History:   Procedure Laterality Date    ADENOIDECTOMY      ANTERIOR CRUCIATE LIGAMENT REPAIR      APPENDECTOMY      CATARACT EXTRACTION W/  INTRAOCULAR LENS IMPLANT Right 2022    Procedure: EXTRACTION, CATARACT, WITH IOL INSERTION;  Surgeon: Higinio Cristina MD;  Location: [facility] OR;  Service: Ophthalmology;  Laterality: Right;    CATARACT EXTRACTION W/  INTRAOCULAR LENS IMPLANT Left 2022    Procedure: EXTRACTION, CATARACT, WITH IOL INSERTION;  Surgeon: Higinio Cristina MD;  Location: [facility] OR;  Service: Ophthalmology;  Laterality: Left;    CEREBRAL ANEURYSM REPAIR      clip     SECTION      DENTAL SURGERY      EYE SURGERY Bilateral     cataract removal and lens implants    TONSILLECTOMY         Review of patient's allergies indicates:  No Known Allergies    Current Facility-Administered Medications on File Prior to Encounter   Medication    sodium hyaluronate (EUFLEXXA) 10 mg/mL(mw 2.4 -3.6 million) injection 20 mg     Current Outpatient Medications on File Prior to Encounter   Medication Sig    allopurinoL (ZYLOPRIM) 100 MG tablet Take  2 tablets (200 mg total) by mouth once daily.    amLODIPine (NORVASC) 10 MG tablet Take 1 tablet (10 mg total) by mouth once daily.    atorvastatin (LIPITOR) 80 MG tablet TAKE 1 TABLET BY MOUTH EVERY DAY    carvediloL (COREG) 12.5 MG tablet Take 1 tablet (12.5 mg total) by mouth 2 (two) times daily with meals.    colchicine (MITIGARE) 0.6 mg Cap Take 1 capsule (0.6 mg total) by mouth once daily.    fluticasone propionate (FLONASE) 50 mcg/actuation nasal spray SPRAY ONCE INTO EACH NOSTRIL ONCE DAILY    hydroCHLOROthiazide (HYDRODIURIL) 25 MG tablet TAKE 1 TABLET BY MOUTH EVERY DAY    hydrocortisone 2.5 % ointment as needed.    ibuprofen (ADVIL,MOTRIN) 600 MG tablet Take 1 tablet (600 mg total) by mouth every 6 (six) hours as needed for Pain.    ketoconazole (NIZORAL) 2 % cream APPLY BETWEEN TOES TWICE DAILY X 2 WKS    LIDOcaine (LIDODERM) 5 % Place 1 patch onto the skin once daily. Remove & Discard patch within 12 hours or as directed by MD Aram buenrostro (SINGULAIR) 10 mg tablet Take 1 tablet (10 mg total) by mouth every evening.    pantoprazole (PROTONIX) 40 MG tablet TAKE 1 TABLET BY MOUTH EVERY DAY     Family History       Problem Relation (Age of Onset)    Alcohol abuse Father    Aneurysm Mother    Gout Brother    Heart disease Brother    Hypertension Mother, Father    Kidney disease Brother    No Known Problems Daughter, Daughter, Daughter          Tobacco Use    Smoking status: Former     Packs/day: 0.50     Years: 20.00     Pack years: 10.00     Types: Cigarettes     Quit date: 1999     Years since quittin.4     Passive exposure: Past    Smokeless tobacco: Never   Substance and Sexual Activity    Alcohol use: Yes     Alcohol/week: 7.0 standard drinks     Types: 7 Glasses of wine per week    Drug use: No    Sexual activity: Yes     Partners: Male     Review of Systems   Constitutional:  Negative for activity change, appetite change, chills, diaphoresis, fatigue and fever.   HENT:   Negative for congestion, dental problem, drooling, ear discharge, ear pain, facial swelling, hearing loss, mouth sores, nosebleeds, postnasal drip, rhinorrhea, sinus pressure, sneezing, sore throat, tinnitus, trouble swallowing and voice change.    Eyes:  Negative for photophobia, pain, discharge, redness, itching and visual disturbance.   Respiratory:  Negative for apnea, cough, choking, chest tightness, shortness of breath, wheezing and stridor.    Cardiovascular:  Negative for chest pain, palpitations and leg swelling.   Gastrointestinal:  Negative for abdominal distention, abdominal pain, anal bleeding, blood in stool, constipation, diarrhea, nausea, rectal pain and vomiting.   Endocrine: Negative for cold intolerance, heat intolerance, polydipsia, polyphagia and polyuria.   Genitourinary:  Negative for decreased urine volume, difficulty urinating, dyspareunia, dysuria, enuresis, flank pain, frequency, genital sores, hematuria, menstrual problem, pelvic pain, urgency, vaginal bleeding, vaginal discharge and vaginal pain.   Musculoskeletal:  Positive for arthralgias (chronic R shoulder pain and acute left hip pain), gait problem (baseline line unsteady gait) and myalgias (Left thigh and leg). Negative for back pain, joint swelling, neck pain and neck stiffness.   Skin:  Negative for color change, pallor, rash and wound.   Allergic/Immunologic: Negative for environmental allergies, food allergies and immunocompromised state.   Neurological:  Negative for dizziness, tremors, seizures, syncope, facial asymmetry, speech difficulty, weakness, light-headedness, numbness and headaches.   Hematological:  Negative for adenopathy. Does not bruise/bleed easily.   Psychiatric/Behavioral:  Negative for agitation, behavioral problems, confusion, decreased concentration, dysphoric mood, hallucinations, self-injury, sleep disturbance and suicidal ideas. The patient is not nervous/anxious and is not hyperactive.    Objective:      Vital Signs (Most Recent):  Temp: 96.4 °F (35.8 °C) (05/27/23 0019)  Pulse: 79 (05/27/23 0019)  Resp: 18 (05/27/23 0118)  BP: 132/63 (05/27/23 0019)  SpO2: 96 % (05/26/23 2345) Vital Signs (24h Range):  Temp:  [96.4 °F (35.8 °C)-98 °F (36.7 °C)] 96.4 °F (35.8 °C)  Pulse:  [78-97] 79  Resp:  [14-22] 18  SpO2:  [93 %-97 %] 96 %  BP: (126-149)/(60-81) 132/63     Weight: 55.8 kg (123 lb)  Body mass index is 21.79 kg/m².     Physical Exam  Constitutional:       General: She is not in acute distress.     Appearance: She is well-developed. She is not diaphoretic.   HENT:      Head: Normocephalic and atraumatic.      Right Ear: External ear normal.      Left Ear: External ear normal.      Nose: Nose normal.      Mouth/Throat:      Pharynx: No oropharyngeal exudate.   Eyes:      General: No scleral icterus.     Conjunctiva/sclera: Conjunctivae normal.      Pupils: Pupils are equal, round, and reactive to light.   Neck:      Thyroid: No thyromegaly.      Vascular: No JVD.      Trachea: No tracheal deviation.   Cardiovascular:      Rate and Rhythm: Normal rate and regular rhythm.      Pulses:           Dorsalis pedis pulses are 2+ on the right side and 2+ on the left side.        Posterior tibial pulses are 2+ on the right side and 2+ on the left side.      Heart sounds: Normal heart sounds. No murmur heard.    No gallop.   Pulmonary:      Effort: Pulmonary effort is normal. No respiratory distress.      Breath sounds: Normal breath sounds. No wheezing or rales.   Chest:      Chest wall: No tenderness.   Abdominal:      General: Bowel sounds are normal. There is no distension.      Palpations: Abdomen is soft. There is no mass.      Tenderness: There is no abdominal tenderness. There is no guarding or rebound.   Genitourinary:     Vagina: No vaginal discharge.   Musculoskeletal:         General: Tenderness (TTP over Left hip. No hematoma or ecchymosis) and deformity (Left leg shortened and externally rotated.) present.       Cervical back: Neck supple.      Comments: Unable to lift left leg due to hip fracture and pain    Lymphadenopathy:      Cervical: No cervical adenopathy.   Skin:     General: Skin is warm and dry.      Coloration: Skin is not pale.      Findings: No erythema or rash.   Neurological:      Mental Status: She is alert and oriented to person, place, and time.      Cranial Nerves: No cranial nerve deficit.      Motor: No abnormal muscle tone.      Coordination: Coordination normal.      Deep Tendon Reflexes: Reflexes are normal and symmetric. Reflexes normal.      Comments: Mild tremors of upper extremities    Psychiatric:         Behavior: Behavior normal.         Thought Content: Thought content normal.         Judgment: Judgment normal.      Comments: Flat affect             CRANIAL NERVES     CN III, IV, VI   Pupils are equal, round, and reactive to light.     Significant Labs: All pertinent labs within the past 24 hours have been reviewed.  Recent Lab Results         05/26/23 2314 05/26/23  2155        Procalcitonin 0.10  Comment: A concentration < 0.25 ng/mL represents a low risk of bacterial   infection.  Procalcitonin may not be accurate among patients with localized   infection, recent trauma or major surgery, immunosuppressed state,   invasive fungal infection, renal dysfunction. Decisions regarding   initiation or continuation of antibiotic therapy should not be based   solely on procalcitonin levels.           Albumin   3.9       Alkaline Phosphatase   53       ALT   34       Anion Gap   15       AST   44       Baso #   0.06       Basophil %   0.4       BILIRUBIN TOTAL   0.7  Comment: For infants and newborns, interpretation of results should be based  on gestational age, weight and in agreement with clinical  observations.    Premature Infant recommended reference ranges:  Up to 24 hours.............<8.0 mg/dL  Up to 48 hours............<12.0 mg/dL  3-5 days..................<15.0 mg/dL  6-29  days.................<15.0 mg/dL         BUN   18       Calcium   8.5       Chloride   95       CO2   23       Creatinine   0.9       Differential Method   Automated       eGFR   >60.0       Eos #   0.3       Eosinophil %   1.8       Estimated Avg Glucose   94       Glucose   137       Gran # (ANC)   11.3       Gran %   79.4       Group & Rh   A POS       Hematocrit   34.0       Hemoglobin   11.1       Hemoglobin A1C External   4.9  Comment: ADA Screening Guidelines:  5.7-6.4%  Consistent with prediabetes  >or=6.5%  Consistent with diabetes    High levels of fetal hemoglobin interfere with the HbA1C  assay. Heterozygous hemoglobin variants (HbS, HgC, etc)do  not significantly interfere with this assay.   However, presence of multiple variants may affect accuracy.         Hepatitis C Ab   Non-reactive       HIV 1/2 Ag/Ab   Non-reactive       Immature Grans (Abs)   0.14  Comment: Mild elevation in immature granulocytes is non specific and   can be seen in a variety of conditions including stress response,   acute inflammation, trauma and pregnancy. Correlation with other   laboratory and clinical findings is essential.         Immature Granulocytes   1.0       INDIRECT KYARA   NEG       INR   1.0  Comment: Coumadin Therapy:  2.0 - 3.0 for INR for all indicators except mechanical heart valves  and antiphospholipid syndromes which should use 2.5 - 3.5.         Lymph #   1.2       Lymph %   8.4       Magnesium   1.4       MCH   30.5       MCHC   32.6       MCV   93       Mono #   1.3       Mono %   9.0       MPV   10.7       nRBC   0       Phosphorus   2.2       Platelets   262       Potassium   2.4  Comment: *Critical value notification by _RS_ with confirmation of receipt to   Kevin Lombardo, RN. at  Date 05/26/2023 Time 22:27.         Prealbumin 31         PROTEIN TOTAL   6.9       Protime   10.3       RBC   3.64       RDW   23.9       Sodium   133       Specimen Outdate   05/29/2023 23:59       Transferrin   286        Troponin I   0.019  Comment: The reference interval for Troponin I represents the 99th percentile   cutoff   for our facility and is consistent with 3rd generation assay   performance.         Vit D, 25-Hydroxy 43  Comment: Vitamin D deficiency.........<10 ng/mL                              Vitamin D insufficiency......10-29 ng/mL       Vitamin D sufficiency........> or equal to 30 ng/mL  Vitamin D toxicity............>100 ng/mL           WBC   14.26               Significant Imaging: I have reviewed all pertinent imaging results/findings within the past 24 hours.    Assessment/Plan:     * Left displaced femoral neck fracture  -s/p ground level mechanical fall at home on 5/26 with immediate severe Left hip pain   -imaging showed acute mildly displaced left femoral neck fracture   -seen by orthopedic with plan for surgical fixation   -neurovascular intact   -no active cardiac issue and denies chest pain or sob   -EKG NSR w/o acute ischemia   -nuclear stress test in 2017 negative for myocardial ischemia   -no indication for further cardiac testing warranted prior to surgery     Perioperative Risk Assessment:  Patient is at intermediate risk for perioperative cardiopulmonary complications.  Recommend proceed to surgery as planned. We will follow with you during the perioperative period  to manage any active medical issues and prevent postoperative complications.    Perioperative Management Recommendations:   -npo, mIVF and multimodal pain control per hip fracture pathway   -oxycodone and IV morphine prn breakthrough pain   -continue home beta blocker   -SCDs for preop DVT PPX   -postop incentive spirometer use     Preop cardiovascular exam  -see above under femoral neck fracture       Hypokalemia  -K 2.4   -induced by HCTZ use   -replaced in ED with oral and IV KCL   -holding HCTZ       Hypomagnesemia  -low Mg 1.7  -likely due to HCTZ use  -replaced with MgSO4 IVPB   -holding HCTZ       Hypophosphatemia  -low Phos  2.2  -likely due to HCTZ use   -will replace with neutraphos and hold HCTZ       Coronary artery disease involving native coronary artery of native heart without angina pectoris  -denies chest pain or sob   -continue GDMT with beta blocker and statin       Hypertension  -chronic and controlled   -continue home amlodipine and carvedilol   -holding HCTZ in setting of hypokalemia and hypomagnesemia   -hydralazine prn       Dyslipidemia  -continue home statin       Gastroesophageal reflux disease without esophagitis  -continue home PPI       Leukocytosis  -WBC 14  -likely stress induced in setting of femoral neck fracture  -afebrile and no signs of overt infection   -check UA and follow trend of WBC       VTE Risk Mitigation (From admission, onward)         Ordered     IP VTE HIGH RISK PATIENT  Once         05/26/23 2334     Place sequential compression device  Until discontinued         05/26/23 2334                   Diego Jackson DO  Department of Hospital Medicine  [facility] Emergency Dept

## 2023-05-27 NOTE — ASSESSMENT & PLAN NOTE
-denies chest pain or sob with chronic t wave inversions on EKG slightly more prominent than previous but denies chest pain and trop neg on admit  -continue GDMT with beta blocker and statin

## 2023-05-27 NOTE — ASSESSMENT & PLAN NOTE
-chronic and controlled   -continue home amlodipine and carvedilol   -holding HCTZ in setting of hypokalemia and hypomagnesemia   -hydralazine prn

## 2023-05-27 NOTE — ASSESSMENT & PLAN NOTE
-WBC 14  -likely stress induced in setting of femoral neck fracture  -afebrile and no signs of overt infection   -check UA and follow trend of WBC

## 2023-05-27 NOTE — ASSESSMENT & PLAN NOTE
Jessica Floyd is a 72 y.o. female with left FNF fracture, closed, NVI. They take no anticoagulation at home. They required walker for ambulatory assistive devices with long distacnes but ambulate independently for short/moderate distances mostly due to arthritis pain in the hips and knees. She estimates that she can walk at least 3 blocks without the walker    -Admitted to medicine hip fracture service for pre-operative clearance and medical evaluation  -To OR likely 5/28/23 for IDA   -Pt marked and consented for surgery  -DVT PPx: Hold anticoagulation  -Abx: Preop abx ordered  -Labs: Prealbumin 31, UA pending, Glucose 137, Albumin 3.9, Hemoglobin 11.1, Hematocrit 34, Platelets 262, White blood cell count 14, A1c pending, INR 1. Other lab results pending.  -Bed rest, abreu, NPO at midnight  -Iv: ordered for contralateral arm    Patient was explained in detail the severity of the injury that was suffered. Patient was explained the risks/benefits/and alternatives to operative management in detail including infection, bleeding, pain, nerve and vascular damage, heterotopic ossification, leg length discrepancies, rotational deformities and they express full understanding.  We discussed the 30% national first year mortality rate with hip fractures, the need for early mobilization, and the expected rehab course.  She expresses full understanding of the condition and expresses that they want to proceed with surgery. The patient is admitted to the medicine hip fracture service for optimization of medical comorbidities. Will plan for OR likely 5/28/23. No guarantees were made, informed consent was obtained. All questions were answered to patient's and family's satisfaction.

## 2023-05-27 NOTE — NURSING
Nurses Note -- 4 Eyes      5/27/2023   7:36 AM      Skin assessed during: Admit      [x] No Altered Skin Integrity Present    []Prevention Measures Documented      [] Yes- Altered Skin Integrity Present or Discovered   [] LDA Added if Not in Epic (Describe Wound)   [] New Altered Skin Integrity was Present on Admit and Documented in LDA   [] Wound Image Taken    Wound Care Consulted? No    Attending Nurse:  Aracelis Smith RN     Second RN/Staff Member:  Tali Mckinley RN

## 2023-05-27 NOTE — ASSESSMENT & PLAN NOTE
Replaced in ER and thought to be hctz contributor on admit so held, appetite spotty at times also per family, replace again as low still this AM and recheck 6 pm

## 2023-05-27 NOTE — ED TRIAGE NOTES
Pt brought to ED via [facility] from home. Pt states that she was walking to bathroom and tripped over a folding table and fell to floor. PT denies LOC or hitting head but is c/o left hip and leg pain. Pt given 55 mcg fentanyl IM by EMS prior to arrival.

## 2023-05-27 NOTE — SUBJECTIVE & OBJECTIVE
Past Medical History:   Diagnosis Date    Allergic rhinitis     Amblyopia     Carotid stenosis     Coronary atherosclerosis     Outside [facility] 2014: 20% mLAD, 50% mCx, 20%, mRCA    Dyslipidemia     ESBL (extended spectrum beta-lactamase) producing bacteria infection     UTI    Hypertension     Nausea and vomiting 2017    Subarachnoid hemorrhage due to ruptured aneurysm        Past Surgical History:   Procedure Laterality Date    ADENOIDECTOMY      ANTERIOR CRUCIATE LIGAMENT REPAIR      APPENDECTOMY      CATARACT EXTRACTION W/  INTRAOCULAR LENS IMPLANT Right 2022    Procedure: EXTRACTION, CATARACT, WITH IOL INSERTION;  Surgeon: Higinio Cristina MD;  Location: [facility] OR;  Service: Ophthalmology;  Laterality: Right;    CATARACT EXTRACTION W/  INTRAOCULAR LENS IMPLANT Left 2022    Procedure: EXTRACTION, CATARACT, WITH IOL INSERTION;  Surgeon: Higinio Cristina MD;  Location: [facility] OR;  Service: Ophthalmology;  Laterality: Left;    CEREBRAL ANEURYSM REPAIR      clip     SECTION      DENTAL SURGERY      EYE SURGERY Bilateral     cataract removal and lens implants    TONSILLECTOMY         Review of patient's allergies indicates:  No Known Allergies    Current Facility-Administered Medications on File Prior to Encounter   Medication    sodium hyaluronate (EUFLEXXA) 10 mg/mL(mw 2.4 -3.6 million) injection 20 mg     Current Outpatient Medications on File Prior to Encounter   Medication Sig    allopurinoL (ZYLOPRIM) 100 MG tablet Take 2 tablets (200 mg total) by mouth once daily.    amLODIPine (NORVASC) 10 MG tablet Take 1 tablet (10 mg total) by mouth once daily.    atorvastatin (LIPITOR) 80 MG tablet TAKE 1 TABLET BY MOUTH EVERY DAY    carvediloL (COREG) 12.5 MG tablet Take 1 tablet (12.5 mg total) by mouth 2 (two) times daily with meals.    colchicine (MITIGARE) 0.6 mg Cap Take 1 capsule (0.6 mg total) by mouth once daily.    fluticasone propionate (FLONASE) 50 mcg/actuation nasal spray SPRAY  ONCE INTO EACH NOSTRIL ONCE DAILY    hydroCHLOROthiazide (HYDRODIURIL) 25 MG tablet TAKE 1 TABLET BY MOUTH EVERY DAY    hydrocortisone 2.5 % ointment as needed.    ibuprofen (ADVIL,MOTRIN) 600 MG tablet Take 1 tablet (600 mg total) by mouth every 6 (six) hours as needed for Pain.    ketoconazole (NIZORAL) 2 % cream APPLY BETWEEN TOES TWICE DAILY X 2 WKS    LIDOcaine (LIDODERM) 5 % Place 1 patch onto the skin once daily. Remove & Discard patch within 12 hours or as directed by MD buenrostro (SINGULAIR) 10 mg tablet Take 1 tablet (10 mg total) by mouth every evening.    pantoprazole (PROTONIX) 40 MG tablet TAKE 1 TABLET BY MOUTH EVERY DAY     Family History       Problem Relation (Age of Onset)    Alcohol abuse Father    Aneurysm Mother    Gout Brother    Heart disease Brother    Hypertension Mother, Father    Kidney disease Brother    No Known Problems Daughter, Daughter, Daughter          Tobacco Use    Smoking status: Former     Packs/day: 0.50     Years: 20.00     Pack years: 10.00     Types: Cigarettes     Quit date: 1999     Years since quittin.4     Passive exposure: Past    Smokeless tobacco: Never   Substance and Sexual Activity    Alcohol use: Yes     Alcohol/week: 7.0 standard drinks     Types: 7 Glasses of wine per week    Drug use: No    Sexual activity: Yes     Partners: Male     Review of Systems   Constitutional:  Negative for activity change, appetite change, chills, diaphoresis, fatigue and fever.   HENT:  Negative for congestion, dental problem, drooling, ear discharge, ear pain, facial swelling, hearing loss, mouth sores, nosebleeds, postnasal drip, rhinorrhea, sinus pressure, sneezing, sore throat, tinnitus, trouble swallowing and voice change.    Eyes:  Negative for photophobia, pain, discharge, redness, itching and visual disturbance.   Respiratory:  Negative for apnea, cough, choking, chest tightness, shortness of breath, wheezing and stridor.    Cardiovascular:  Negative for chest  pain, palpitations and leg swelling.   Gastrointestinal:  Negative for abdominal distention, abdominal pain, anal bleeding, blood in stool, constipation, diarrhea, nausea, rectal pain and vomiting.   Endocrine: Negative for cold intolerance, heat intolerance, polydipsia, polyphagia and polyuria.   Genitourinary:  Negative for decreased urine volume, difficulty urinating, dyspareunia, dysuria, enuresis, flank pain, frequency, genital sores, hematuria, menstrual problem, pelvic pain, urgency, vaginal bleeding, vaginal discharge and vaginal pain.   Musculoskeletal:  Positive for arthralgias (chronic R shoulder pain and acute left hip pain), gait problem (baseline line unsteady gait) and myalgias (Left thigh and leg). Negative for back pain, joint swelling, neck pain and neck stiffness.   Skin:  Negative for color change, pallor, rash and wound.   Allergic/Immunologic: Negative for environmental allergies, food allergies and immunocompromised state.   Neurological:  Negative for dizziness, tremors, seizures, syncope, facial asymmetry, speech difficulty, weakness, light-headedness, numbness and headaches.   Hematological:  Negative for adenopathy. Does not bruise/bleed easily.   Psychiatric/Behavioral:  Negative for agitation, behavioral problems, confusion, decreased concentration, dysphoric mood, hallucinations, self-injury, sleep disturbance and suicidal ideas. The patient is not nervous/anxious and is not hyperactive.    Objective:     Vital Signs (Most Recent):  Temp: 96.4 °F (35.8 °C) (05/27/23 0019)  Pulse: 79 (05/27/23 0019)  Resp: 18 (05/27/23 0118)  BP: 132/63 (05/27/23 0019)  SpO2: 96 % (05/26/23 2345) Vital Signs (24h Range):  Temp:  [96.4 °F (35.8 °C)-98 °F (36.7 °C)] 96.4 °F (35.8 °C)  Pulse:  [78-97] 79  Resp:  [14-22] 18  SpO2:  [93 %-97 %] 96 %  BP: (126-149)/(60-81) 132/63     Weight: 55.8 kg (123 lb)  Body mass index is 21.79 kg/m².     Physical Exam  Constitutional:       General: She is not in acute  distress.     Appearance: She is well-developed. She is not diaphoretic.   HENT:      Head: Normocephalic and atraumatic.      Right Ear: External ear normal.      Left Ear: External ear normal.      Nose: Nose normal.      Mouth/Throat:      Pharynx: No oropharyngeal exudate.   Eyes:      General: No scleral icterus.     Conjunctiva/sclera: Conjunctivae normal.      Pupils: Pupils are equal, round, and reactive to light.   Neck:      Thyroid: No thyromegaly.      Vascular: No JVD.      Trachea: No tracheal deviation.   Cardiovascular:      Rate and Rhythm: Normal rate and regular rhythm.      Pulses:           Dorsalis pedis pulses are 2+ on the right side and 2+ on the left side.        Posterior tibial pulses are 2+ on the right side and 2+ on the left side.      Heart sounds: Normal heart sounds. No murmur heard.    No gallop.   Pulmonary:      Effort: Pulmonary effort is normal. No respiratory distress.      Breath sounds: Normal breath sounds. No wheezing or rales.   Chest:      Chest wall: No tenderness.   Abdominal:      General: Bowel sounds are normal. There is no distension.      Palpations: Abdomen is soft. There is no mass.      Tenderness: There is no abdominal tenderness. There is no guarding or rebound.   Genitourinary:     Vagina: No vaginal discharge.   Musculoskeletal:         General: Tenderness (TTP over Left hip. No hematoma or ecchymosis) and deformity (Left leg shortened and externally rotated.) present.      Cervical back: Neck supple.      Comments: Unable to lift left leg due to hip fracture and pain    Lymphadenopathy:      Cervical: No cervical adenopathy.   Skin:     General: Skin is warm and dry.      Coloration: Skin is not pale.      Findings: No erythema or rash.   Neurological:      Mental Status: She is alert and oriented to person, place, and time.      Cranial Nerves: No cranial nerve deficit.      Motor: No abnormal muscle tone.      Coordination: Coordination normal.      Deep  Tendon Reflexes: Reflexes are normal and symmetric. Reflexes normal.      Comments: Mild tremors of upper extremities    Psychiatric:         Behavior: Behavior normal.         Thought Content: Thought content normal.         Judgment: Judgment normal.      Comments: Flat affect             CRANIAL NERVES     CN III, IV, VI   Pupils are equal, round, and reactive to light.     Significant Labs: All pertinent labs within the past 24 hours have been reviewed.  Recent Lab Results         05/26/23  2314   05/26/23  2155        Procalcitonin 0.10  Comment: A concentration < 0.25 ng/mL represents a low risk of bacterial   infection.  Procalcitonin may not be accurate among patients with localized   infection, recent trauma or major surgery, immunosuppressed state,   invasive fungal infection, renal dysfunction. Decisions regarding   initiation or continuation of antibiotic therapy should not be based   solely on procalcitonin levels.           Albumin   3.9       Alkaline Phosphatase   53       ALT   34       Anion Gap   15       AST   44       Baso #   0.06       Basophil %   0.4       BILIRUBIN TOTAL   0.7  Comment: For infants and newborns, interpretation of results should be based  on gestational age, weight and in agreement with clinical  observations.    Premature Infant recommended reference ranges:  Up to 24 hours.............<8.0 mg/dL  Up to 48 hours............<12.0 mg/dL  3-5 days..................<15.0 mg/dL  6-29 days.................<15.0 mg/dL         BUN   18       Calcium   8.5       Chloride   95       CO2   23       Creatinine   0.9       Differential Method   Automated       eGFR   >60.0       Eos #   0.3       Eosinophil %   1.8       Estimated Avg Glucose   94       Glucose   137       Gran # (ANC)   11.3       Gran %   79.4       Group & Rh   A POS       Hematocrit   34.0       Hemoglobin   11.1       Hemoglobin A1C External   4.9  Comment: ADA Screening Guidelines:  5.7-6.4%  Consistent with  prediabetes  >or=6.5%  Consistent with diabetes    High levels of fetal hemoglobin interfere with the HbA1C  assay. Heterozygous hemoglobin variants (HbS, HgC, etc)do  not significantly interfere with this assay.   However, presence of multiple variants may affect accuracy.         Hepatitis C Ab   Non-reactive       HIV 1/2 Ag/Ab   Non-reactive       Immature Grans (Abs)   0.14  Comment: Mild elevation in immature granulocytes is non specific and   can be seen in a variety of conditions including stress response,   acute inflammation, trauma and pregnancy. Correlation with other   laboratory and clinical findings is essential.         Immature Granulocytes   1.0       INDIRECT KYARA   NEG       INR   1.0  Comment: Coumadin Therapy:  2.0 - 3.0 for INR for all indicators except mechanical heart valves  and antiphospholipid syndromes which should use 2.5 - 3.5.         Lymph #   1.2       Lymph %   8.4       Magnesium   1.4       MCH   30.5       MCHC   32.6       MCV   93       Mono #   1.3       Mono %   9.0       MPV   10.7       nRBC   0       Phosphorus   2.2       Platelets   262       Potassium   2.4  Comment: *Critical value notification by _RS_ with confirmation of receipt to   Kevin Lombardo, RN. at  Date 05/26/2023 Time 22:27.         Prealbumin 31         PROTEIN TOTAL   6.9       Protime   10.3       RBC   3.64       RDW   23.9       Sodium   133       Specimen Outdate   05/29/2023 23:59       Transferrin   286       Troponin I   0.019  Comment: The reference interval for Troponin I represents the 99th percentile   cutoff   for our facility and is consistent with 3rd generation assay   performance.         Vit D, 25-Hydroxy 43  Comment: Vitamin D deficiency.........<10 ng/mL                              Vitamin D insufficiency......10-29 ng/mL       Vitamin D sufficiency........> or equal to 30 ng/mL  Vitamin D toxicity............>100 ng/mL           WBC   14.26               Significant Imaging: I  have reviewed all pertinent imaging results/findings within the past 24 hours.

## 2023-05-27 NOTE — ED PROVIDER NOTES
Encounter Date: 2023       History     Chief Complaint   Patient presents with    Fall     Mechanical fall. Left leg short and rotated     Pt is a 72 year old female with PMHx signifiatn for HTN who presents for evaluaiton of left hip pain, which began today after a mechanical slip and fall from ground level. Pt reports left hip, knee, and ankle pain folowing the fall. She has not been ambulatory since the injyry. No fever, chills, chest pain, shortness of breath, nausea, vomting, numbness, or weakness     The history is provided by the patient.   Review of patient's allergies indicates:  No Known Allergies  Past Medical History:   Diagnosis Date    Allergic rhinitis     Amblyopia     Carotid stenosis     Coronary atherosclerosis     Outside [facility] 2014: 20% mLAD, 50% mCx, 20%, mRCA    Dyslipidemia     ESBL (extended spectrum beta-lactamase) producing bacteria infection     UTI    Hypertension     Nausea and vomiting 2017    Subarachnoid hemorrhage due to ruptured aneurysm      Past Surgical History:   Procedure Laterality Date    ADENOIDECTOMY      ANTERIOR CRUCIATE LIGAMENT REPAIR      APPENDECTOMY      CATARACT EXTRACTION W/  INTRAOCULAR LENS IMPLANT Right 2022    Procedure: EXTRACTION, CATARACT, WITH IOL INSERTION;  Surgeon: Higinio Cristina MD;  Location: [facility] OR;  Service: Ophthalmology;  Laterality: Right;    CATARACT EXTRACTION W/  INTRAOCULAR LENS IMPLANT Left 2022    Procedure: EXTRACTION, CATARACT, WITH IOL INSERTION;  Surgeon: Higinio Cristina MD;  Location: [facility] OR;  Service: Ophthalmology;  Laterality: Left;    CEREBRAL ANEURYSM REPAIR      clip     SECTION      DENTAL SURGERY      EYE SURGERY Bilateral     cataract removal and lens implants    TONSILLECTOMY       Family History   Problem Relation Age of Onset    Hypertension Mother     Aneurysm Mother     Hypertension Father     Alcohol abuse Father     Kidney disease Brother     Heart disease Brother     Gout  Brother     No Known Problems Daughter     No Known Problems Daughter     No Known Problems Daughter      Social History     Tobacco Use    Smoking status: Former     Packs/day: 0.50     Years: 20.00     Pack years: 10.00     Types: Cigarettes     Quit date: 1999     Years since quittin.4     Passive exposure: Past    Smokeless tobacco: Never   Substance Use Topics    Alcohol use: Yes     Alcohol/week: 7.0 standard drinks     Types: 7 Glasses of wine per week    Drug use: No     Review of Systems   Constitutional:  Negative for chills and fever.   HENT:  Negative for ear discharge and ear pain.    Eyes:  Negative for pain and redness.   Respiratory:  Negative for cough and shortness of breath.    Cardiovascular:  Negative for chest pain and palpitations.   Gastrointestinal:  Negative for abdominal pain, constipation and nausea.   Genitourinary:  Negative for dysuria.   Musculoskeletal:         Hip pain, knee pain, ankle pain   Skin:  Negative for pallor and rash.   Neurological:  Negative for weakness, numbness and headaches.     Physical Exam     Initial Vitals [23]   BP Pulse Resp Temp SpO2   (!) 144/74 97 16 98 °F (36.7 °C) 97 %      MAP       --         Physical Exam    Nursing note and vitals reviewed.  Constitutional: She appears well-developed and well-nourished. No distress.   HENT:   Head: Normocephalic and atraumatic.   Mouth/Throat: Oropharynx is clear and moist.   Eyes: Conjunctivae and EOM are normal. Pupils are equal, round, and reactive to light.   Neck: Neck supple. No tracheal deviation present.   Cardiovascular:  Normal rate, regular rhythm and intact distal pulses.           No murmur heard.  Pulmonary/Chest: Breath sounds normal. No stridor. No respiratory distress. She has no wheezes.   Abdominal: Abdomen is soft. She exhibits no distension. There is no abdominal tenderness.   Musculoskeletal:      Cervical back: Neck supple.      Comments: Left hip tenderness to palpation  without ecchymosis or deformity. Left knee tenderness to palpation. Left ankle tenderness to palpation     Neurological: She is alert and oriented to person, place, and time. No sensory deficit.   Skin: Skin is warm and dry.       ED Course   Procedures  Labs Reviewed   CBC W/ AUTO DIFFERENTIAL - Abnormal; Notable for the following components:       Result Value    WBC 14.26 (*)     RBC 3.64 (*)     Hemoglobin 11.1 (*)     Hematocrit 34.0 (*)     RDW 23.9 (*)     Immature Granulocytes 1.0 (*)     Gran # (ANC) 11.3 (*)     Immature Grans (Abs) 0.14 (*)     Mono # 1.3 (*)     Gran % 79.4 (*)     Lymph % 8.4 (*)     All other components within normal limits   COMPREHENSIVE METABOLIC PANEL - Abnormal; Notable for the following components:    Sodium 133 (*)     Potassium 2.4 (*)     Glucose 137 (*)     Calcium 8.5 (*)     Alkaline Phosphatase 53 (*)     AST 44 (*)     All other components within normal limits   MAGNESIUM - Abnormal; Notable for the following components:    Magnesium 1.4 (*)     All other components within normal limits    Narrative:     ADD ON MAGNESIUM, PHOS AND TRANSFERRIN PER DR CAYLA VEGA/ORDER#   [id number], [id number] AND [id number] @ 23:09     ADD ON TROPONIN PER DR CAYLA VEGA/ORDER# [id number] @ 22:59   PHOSPHORUS - Abnormal; Notable for the following components:    Phosphorus 2.2 (*)     All other components within normal limits    Narrative:     ADD ON MAGNESIUM, PHOS AND TRANSFERRIN PER DR CAYLA VEGA/ORDER#   [id number], [id number] AND [id number] @ 23:09     ADD ON TROPONIN PER DR CAYLA VEGA/ORDER# [id number] @ 22:59   HIV 1 / 2 ANTIBODY    Narrative:     Release to patient->Immediate   HEPATITIS C ANTIBODY    Narrative:     Release to patient->Immediate   PROTIME-INR   TROPONIN I   TROPONIN I    Narrative:     ADD ON MAGNESIUM, PHOS AND TRANSFERRIN PER DR CAYLA VEGA/ORDER#   [id number], [id number] AND [id number] @ 23:09     ADD ON TROPONIN PER DR CAYLA VEGA/ORDER# [id number] @ 22:59   TRANSFERRIN   MAGNESIUM  "  PHOSPHORUS   HEMOGLOBIN A1C   TRANSFERRIN    Narrative:     ADD ON MAGNESIUM, PHOS AND TRANSFERRIN PER DR CAYLA VEGA/ORDER#   [id number], [id number] AND [id number] @ 23:09     ADD ON TROPONIN PER DR CAYLA VEGA/ORDER# [id number] @ 22:59   HEMOGLOBIN A1C    Narrative:     ADD ON MAGNESIUM, PHOS AND TRANSFERRIN PER DR CAYLA VEGA/ORDER#   [id number], [id number] AND [id number] @ 23:09     ADD ON TROPONIN PER DR CAYLA VEGA/ORDER# [id number] @ 22:59    PER CAYLA VEGA MD REQUEST ADD ON HEMOGLOBIN A1C (ORDER [id number])   URINALYSIS, REFLEX TO URINE CULTURE   VITAMIN D   PREALBUMIN   PROCALCITONIN   TYPE & SCREEN          Imaging Results              X-Ray Chest 1 View (Final result)  Result time 05/27/23 00:09:58      Final result by Rick Liu MD (05/27/23 00:09:58)                   Impression:      No acute cardiopulmonary finding identified on this single view.      Electronically signed by: Rick Liu MD  Date:    05/27/2023  Time:    00:09               Narrative:    EXAMINATION:  XR CHEST 1 VIEW    CLINICAL HISTORY:  Provided history is "  Encounter for other preprocedural examination".    TECHNIQUE:  One view of the chest.    COMPARISON:  09/23/2018.    FINDINGS:  Cardiac wires overlie the chest.  Cardiomediastinal silhouette is stable in size.  Atherosclerotic calcifications overlie the aortic arch.  No confluent area of consolidation.  No large pleural effusion.  No pneumothorax.                                       X-Ray Pelvis Routine AP (Final result)  Result time 05/26/23 22:52:12   Procedure changed from X-Ray Hip 2 or 3 views Left (with Pelvis when performed)     Final result by Rich Anderson MD (05/26/23 22:52:12)                   Impression:      Acute mildly displaced fracture of the left femoral neck.      Electronically signed by: Rich Anderson MD  Date:    05/26/2023  Time:    22:52               Narrative:    EXAMINATION:  XR PELVIS ROUTINE AP; XR FEMUR 2 VIEW LEFT    CLINICAL " HISTORY:  fall ;  Pain in leg, unspecified    TECHNIQUE:  AP view of the pelvis was performed.  AP and lateral views of the left femur were performed.    COMPARISON:  None.    FINDINGS:  There is an acute mildly displaced fracture of the left femoral neck.  There is mild cranial migration of the more distal left femur.  The left femoral head remains appropriately seated within the acetabulum.    The right femur appears intact and appropriately seated within the acetabula with associated osteoarthrosis.  There is no pubic symphyseal or sacroiliac joint diastasis.  There are degenerative changes of the lower lumbar spine.  Prominent vascular calcifications noted.    The more distal left femur is intact.  Left knee alignment appears within normal limits.                                       X-Ray Knee 1 or 2 View Left (Final result)  Result time 05/26/23 22:53:32   Procedure changed from X-Ray Knee 3 View Left     Final result by Rich Anderson MD (05/26/23 22:53:32)                   Impression:      No radiographic evidence of acute displaced fracture or dislocation of the left knee.      Electronically signed by: Rich Anderson MD  Date:    05/26/2023  Time:    22:53               Narrative:    EXAMINATION:  XR KNEE 1 OR 2 VIEW LEFT    CLINICAL HISTORY:  fall ;  Pain in leg, unspecified    TECHNIQUE:  Two views of the left knee    COMPARISON:  10/31/2022    FINDINGS:  No radiographic evidence of acute displaced fracture or dislocation of the left knee.  There is mild narrowing of the medial and lateral tibiofemoral compartments.  No significant suprapatellar joint effusion appreciated.  There are prominent vascular calcifications noted.                                       X-Ray Femur AP/LAT Left (Final result)  Result time 05/26/23 22:52:12      Final result by Rich Anderson MD (05/26/23 22:52:12)                   Impression:      Acute mildly displaced fracture of the left femoral  neck.      Electronically signed by: Rich Anderson MD  Date:    05/26/2023  Time:    22:52               Narrative:    EXAMINATION:  XR PELVIS ROUTINE AP; XR FEMUR 2 VIEW LEFT    CLINICAL HISTORY:  fall ;  Pain in leg, unspecified    TECHNIQUE:  AP view of the pelvis was performed.  AP and lateral views of the left femur were performed.    COMPARISON:  None.    FINDINGS:  There is an acute mildly displaced fracture of the left femoral neck.  There is mild cranial migration of the more distal left femur.  The left femoral head remains appropriately seated within the acetabulum.    The right femur appears intact and appropriately seated within the acetabula with associated osteoarthrosis.  There is no pubic symphyseal or sacroiliac joint diastasis.  There are degenerative changes of the lower lumbar spine.  Prominent vascular calcifications noted.    The more distal left femur is intact.  Left knee alignment appears within normal limits.                                       X-Ray Ankle Complete Left (Final result)  Result time 05/26/23 22:54:59      Final result by Rich Anderson MD (05/26/23 22:54:59)                   Impression:      No radiographic evidence of acute displaced fracture or dislocation of the left ankle.      Electronically signed by: Rich Anderson MD  Date:    05/26/2023  Time:    22:54               Narrative:    EXAMINATION:  XR ANKLE COMPLETE 3 VIEW LEFT    CLINICAL HISTORY:  Pain in unspecified ankle and joints of unspecified foot    TECHNIQUE:  AP, lateral and oblique views of the left ankle were performed.    COMPARISON:  Left foot radiograph series 07/13/2022    FINDINGS:  There is no radiographic evidence of acute displaced fracture.  The ankle mortise appears maintained and symmetric.  There is mild diffuse soft tissue edema.                                       Medications   calcium-vitamin D3 500 mg-5 mcg (200 unit) per tablet 2 tablet (has no administration in time range)    sodium chloride 0.9% flush 10 mL (has no administration in time range)   pregabalin capsule 75 mg (has no administration in time range)   acetaminophen tablet 1,000 mg (has no administration in time range)   melatonin tablet 6 mg (has no administration in time range)   bisacodyL suppository 10 mg (has no administration in time range)   ondansetron injection 4 mg (has no administration in time range)   methocarbamoL tablet 500 mg (has no administration in time range)   oxyCODONE immediate release tablet 5 mg (has no administration in time range)   morphine injection 2 mg (has no administration in time range)   vitamin D 1000 units tablet 2,000 Units (has no administration in time range)   polyethylene glycol packet 17 g (has no administration in time range)   albuterol-ipratropium 2.5 mg-0.5 mg/3 mL nebulizer solution 3 mL (has no administration in time range)   dextrose 5 % and 0.45 % NaCl with KCl 20 mEq infusion (has no administration in time range)   allopurinoL tablet 200 mg (has no administration in time range)   amLODIPine tablet 10 mg (has no administration in time range)   atorvastatin tablet 80 mg (has no administration in time range)   carvediloL tablet 12.5 mg (has no administration in time range)   fluticasone propionate 50 mcg/actuation nasal spray 100 mcg (has no administration in time range)   montelukast tablet 10 mg (has no administration in time range)   pantoprazole EC tablet 40 mg (has no administration in time range)   potassium chloride 10 mEq in 100 mL IVPB (has no administration in time range)   morphine injection 4 mg (4 mg Intravenous Given 5/26/23 2138)   ondansetron injection 4 mg (4 mg Intravenous Given 5/26/23 2144)   ketorolac injection 9.999 mg (9.999 mg Intravenous Given 5/26/23 2230)   sodium chloride 0.9% bolus 1,000 mL 1,000 mL (0 mLs Intravenous Stopped 5/26/23 2350)   potassium chloride SA CR tablet 40 mEq (40 mEq Oral Given 5/26/23 2232)   magnesium sulfate 2g in water 50mL  IVPB (premix) (0 g Intravenous Stopped 5/26/23 9009)     Medical Decision Making:   History:   Old Medical Records: I decided to obtain old medical records.  Initial Assessment:   Pt presents for hip pain following fall   Differential Diagnosis:   Fracture, dislocation, contusion  Clinical Tests:   Lab Tests: Ordered and Reviewed  Radiological Study: Ordered and Reviewed  Medical Tests: Ordered and Reviewed  ED Management:  Ankle, knee, and femur x-ray without fracture or dislocation. Hip x-ray with evidence of left hip fracture. Orthopedics consulted. Pt admitted to hospital medicine for further management and evaluation with plan for OR tomorrow. Pre-op labs and EKG obtained. Labs significant for hypokalemia, which was repleted in the ED. EKG with evidence of t wave inversions in V4-V5, which were demonstrated on previous EKGs but not as prominent currently. Trop obtained which was negative                         Clinical Impression:   Final diagnoses:  [M79.606] Leg pain  [M25.579] Ankle pain  [R09.02] Hypoxia  [Z01.818] Pre-op exam  [S72.009A] Hip fracture        ED Disposition Condition    Admit                 Baljinder Alex Jr., MD  Resident  05/27/23 0029

## 2023-05-27 NOTE — PROGRESS NOTES
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 Pt states that she was walking to bathroom and tripped over a folding table and fell to hardwood floor around 6 pm this evening. Patient landed on left hip with immediate severe left hip and leg pain. The fall witnessed by her  who called EMS as patient was unable to get up or bear any weight on left side. Patient denies hitting her head, LOC, bleeding on site, chest pain, palpitation, dizziness, sob, focal weakness or numbness associated with fall.  Pt given 55 mcg fentanyl IM by EMS prior to arrival. Imaging showed acute mildly displaced left femoral neck fracture. Patient seen by orthopedic with plan for surgical fixation.      ED course: afebrile and vitals stable. Imaging showed acute mildly displaced left femoral neck fracture. EKG NSR with Q waves in septal leads VI-VII, TWI V4-6 (old). Labs significant for WBC 14, K 2.4, Mg 1.4 and phos 2.2. Patient received morphine, zofran, toradol, 40 meq oral KCL, magnesium sulfate and 1L NS bolus in ED. During my interview patient is awake and her  is present at bedside. She reports severe muscle spasm and left hip pain with minimal movement. Patient states at baseline she cane or walker sometime to avoid falls. She goes to physical therapy for degenerative R shoulder rotator cuff tear with improving strength and range of motion. She  walks around the house with her 5 year old granddaughter without chest pain or dyspnea. She denies fever, chills, cough, N/V/D/abdominal pain, dysuria or hematuria. She denies taking blood thinner. She does not smoke, but drinks couple of glasses of wine with dinner.       Overview/Hospital Course:  No notes on file    Interval history- seen this morning with daughter at bedside and in good spirits. She reports pain controlled right now while sitting in bed but had some before receiving pain meds overnight. Very acive at baseline, walks around with family and granddaughter and so ortho planning for IDA tomorrow. Messaged dr lara who will come by or call daughter to talk to her about surgical questions she has for their team. She has a low appetite at times at home as gets full quickly and daughter is going to bring her meals from outside hospital later today to help with that. Her K and Mag were low on admit and hctz held on admit per night tem recs as possible contributor, she reports were low once before when had severe nosebleeding issue but not aware of other instances. Replaced overnight but still low on labs so replace K and Mag again today and then recheck at 6 pm to ensure repleted before OR tomorrow. Receives Iron injctions long term for ERIK with hg of 11 on admission. Has no margaret pain or dyspnea, has chronic t wave inversions in lateral leads on EKG a little more than previous EKGS but trop negative and no chest pain on admit. Patient in good spirits and joking with family and granddaughter plans to come visit later today as well which she is looking forward to        Review of patient's allergies indicates:  No Known Allergies    Current Facility-Administered Medications on File Prior to Encounter   Medication    sodium hyaluronate (EUFLEXXA) 10 mg/mL(mw 2.4 -3.6 million) injection 20 mg     Current Outpatient Medications on File Prior to Encounter   Medication Sig    allopurinoL (ZYLOPRIM) 100 MG  tablet Take 2 tablets (200 mg total) by mouth once daily.    amLODIPine (NORVASC) 10 MG tablet Take 1 tablet (10 mg total) by mouth once daily.    atorvastatin (LIPITOR) 80 MG tablet TAKE 1 TABLET BY MOUTH EVERY DAY    carvediloL (COREG) 12.5 MG tablet Take 1 tablet (12.5 mg total) by mouth 2 (two) times daily with meals.    colchicine (MITIGARE) 0.6 mg Cap Take 1 capsule (0.6 mg total) by mouth once daily.    fluticasone propionate (FLONASE) 50 mcg/actuation nasal spray SPRAY ONCE INTO EACH NOSTRIL ONCE DAILY    hydroCHLOROthiazide (HYDRODIURIL) 25 MG tablet TAKE 1 TABLET BY MOUTH EVERY DAY    hydrocortisone 2.5 % ointment as needed.    ibuprofen (ADVIL,MOTRIN) 600 MG tablet Take 1 tablet (600 mg total) by mouth every 6 (six) hours as needed for Pain.    ketoconazole (NIZORAL) 2 % cream APPLY BETWEEN TOES TWICE DAILY X 2 WKS    LIDOcaine (LIDODERM) 5 % Place 1 patch onto the skin once daily. Remove & Discard patch within 12 hours or as directed by MD    montelukast (SINGULAIR) 10 mg tablet Take 1 tablet (10 mg total) by mouth every evening.    pantoprazole (PROTONIX) 40 MG tablet TAKE 1 TABLET BY MOUTH EVERY DAY    [DISCONTINUED] montelukast (SINGULAIR) 10 mg tablet Take 1 tablet (10 mg total) by mouth every evening.     Family History       Problem Relation (Age of Onset)    Alcohol abuse Father    Aneurysm Mother    Gout Brother    Heart disease Brother    Hypertension Mother, Father    Kidney disease Brother    No Known Problems Daughter, Daughter, Daughter          Tobacco Use    Smoking status: Former     Packs/day: 0.50     Years: 20.00     Pack years: 10.00     Types: Cigarettes     Quit date: 1999     Years since quittin.4     Passive exposure: Past    Smokeless tobacco: Never   Substance and Sexual Activity    Alcohol use: Yes     Alcohol/week: 7.0 standard drinks     Types: 7 Glasses of wine per week    Drug use: No    Sexual activity: Yes     Partners: Male     Review of  Systems   Constitutional:  Negative for activity change, appetite change, chills, diaphoresis, fatigue and fever.   HENT:  Negative for congestion, dental problem, drooling, ear discharge, ear pain, facial swelling, hearing loss, mouth sores, nosebleeds, postnasal drip, rhinorrhea, sinus pressure, sneezing, sore throat, tinnitus, trouble swallowing and voice change.    Eyes:  Negative for photophobia, pain, discharge, redness, itching and visual disturbance.   Respiratory:  Negative for apnea, cough, choking, chest tightness, shortness of breath, wheezing and stridor.    Cardiovascular:  Negative for chest pain, palpitations and leg swelling.   Gastrointestinal:  Negative for abdominal distention, abdominal pain, anal bleeding, blood in stool, constipation, diarrhea, nausea, rectal pain and vomiting.   Endocrine: Negative for cold intolerance, heat intolerance, polydipsia, polyphagia and polyuria.   Genitourinary:  Negative for decreased urine volume, difficulty urinating, dyspareunia, dysuria, enuresis, flank pain, frequency, genital sores, hematuria, menstrual problem, pelvic pain, urgency, vaginal bleeding, vaginal discharge and vaginal pain.   Musculoskeletal:  Positive for arthralgias (chronic R shoulder pain and acute left hip pain), gait problem (baseline line unsteady gait) and myalgias (Left thigh and leg). Negative for back pain, joint swelling, neck pain and neck stiffness.   Skin:  Negative for color change, pallor, rash and wound.   Allergic/Immunologic: Negative for environmental allergies, food allergies and immunocompromised state.   Neurological:  Negative for dizziness, tremors, seizures, syncope, facial asymmetry, speech difficulty, weakness, light-headedness, numbness and headaches.   Hematological:  Negative for adenopathy. Does not bruise/bleed easily.   Psychiatric/Behavioral:  Negative for agitation, behavioral problems, confusion, decreased concentration, dysphoric mood, hallucinations,  self-injury, sleep disturbance and suicidal ideas. The patient is not nervous/anxious and is not hyperactive.    Objective:     Vital Signs (Most Recent):  Temp: 97.2 °F (36.2 °C) (05/27/23 1128)  Pulse: 72 (05/27/23 1128)  Resp: 18 (05/27/23 1201)  BP: 112/68 (05/27/23 1128)  SpO2: (!) 88 % (05/27/23 1128) Vital Signs (24h Range):  Temp:  [96.4 °F (35.8 °C)-98 °F (36.7 °C)] 97.2 °F (36.2 °C)  Pulse:  [72-97] 72  Resp:  [14-22] 18  SpO2:  [88 %-97 %] 88 %  BP: (112-149)/(60-81) 112/68     Weight: 55.8 kg (123 lb)  Body mass index is 21.79 kg/m².     Physical Exam  Constitutional:       General: She is not in acute distress.     Appearance: She is well-developed. She is not diaphoretic.   HENT:      Head: Normocephalic and atraumatic.      Right Ear: External ear normal.      Left Ear: External ear normal.      Nose: Nose normal.      Mouth/Throat:      Pharynx: No oropharyngeal exudate.   Eyes:      General: No scleral icterus.     Conjunctiva/sclera: Conjunctivae normal.      Pupils: Pupils are equal, round, and reactive to light.   Neck:      Thyroid: No thyromegaly.      Vascular: No JVD.      Trachea: No tracheal deviation.   Cardiovascular:      Rate and Rhythm: Normal rate and regular rhythm.      Pulses:           Dorsalis pedis pulses are 2+ on the right side and 2+ on the left side.        Posterior tibial pulses are 2+ on the right side and 2+ on the left side.      Heart sounds: Normal heart sounds. No murmur heard.    No gallop.   Pulmonary:      Effort: Pulmonary effort is normal. No respiratory distress.      Breath sounds: Normal breath sounds. No wheezing or rales.   Chest:      Chest wall: No tenderness.   Abdominal:      General: Bowel sounds are normal. There is no distension.      Palpations: Abdomen is soft. There is no mass.      Tenderness: There is no abdominal tenderness. There is no guarding or rebound.   Genitourinary:     Vagina: No vaginal discharge.   Musculoskeletal:         General:  Tenderness (TTP over Left hip. No hematoma or ecchymosis) and deformity (Left leg shortened and externally rotated.) present.      Cervical back: Neck supple.      Comments: Unable to lift left leg due to hip fracture and pain    Lymphadenopathy:      Cervical: No cervical adenopathy.   Skin:     General: Skin is warm and dry.      Coloration: Skin is not pale.      Findings: No erythema or rash.   Neurological:      Mental Status: She is alert and oriented to person, place, and time.      Cranial Nerves: No cranial nerve deficit.      Motor: No abnormal muscle tone.      Coordination: Coordination normal.      Deep Tendon Reflexes: Reflexes are normal and symmetric. Reflexes normal.   Psychiatric:         Behavior: Behavior normal.         Thought Content: Thought content normal.         Judgment: Judgment normal.            CRANIAL NERVES     CN III, IV, VI   Pupils are equal, round, and reactive to light.     Significant Labs: All pertinent labs within the past 24 hours have been reviewed.  Recent Lab Results         05/27/23  0803   05/27/23  0445   05/26/23  2314   05/26/23  2155        Procalcitonin     0.10  Comment: A concentration < 0.25 ng/mL represents a low risk of bacterial   infection.  Procalcitonin may not be accurate among patients with localized   infection, recent trauma or major surgery, immunosuppressed state,   invasive fungal infection, renal dysfunction. Decisions regarding   initiation or continuation of antibiotic therapy should not be based   solely on procalcitonin levels.           Albumin       3.9       Alkaline Phosphatase       53       ALT       34       Anion Gap 10       15       Appearance, UA   Clear           AST       44       Baso #       0.06       Basophil %       0.4       Bilirubin (UA)   Negative           BILIRUBIN TOTAL       0.7  Comment: For infants and newborns, interpretation of results should be based  on gestational age, weight and in agreement with  clinical  observations.    Premature Infant recommended reference ranges:  Up to 24 hours.............<8.0 mg/dL  Up to 48 hours............<12.0 mg/dL  3-5 days..................<15.0 mg/dL  6-29 days.................<15.0 mg/dL         BUN 11       18       Calcium 8.3       8.5       Chloride 99       95       CO2 30       23       Color, UA   Yellow           Creatinine 0.7       0.9       Differential Method       Automated       eGFR >60.0       >60.0       Eos #       0.3       Eosinophil %       1.8       Estimated Avg Glucose       94       Glucose 107       137       Glucose, UA   Negative           Gran # (ANC)       11.3       Gran %       79.4       Group & Rh       A POS       Hematocrit       34.0       Hemoglobin       11.1       Hemoglobin A1C External       4.9  Comment: ADA Screening Guidelines:  5.7-6.4%  Consistent with prediabetes  >or=6.5%  Consistent with diabetes    High levels of fetal hemoglobin interfere with the HbA1C  assay. Heterozygous hemoglobin variants (HbS, HgC, etc)do  not significantly interfere with this assay.   However, presence of multiple variants may affect accuracy.         Hepatitis C Ab       Non-reactive       HIV 1/2 Ag/Ab       Non-reactive       Immature Grans (Abs)       0.14  Comment: Mild elevation in immature granulocytes is non specific and   can be seen in a variety of conditions including stress response,   acute inflammation, trauma and pregnancy. Correlation with other   laboratory and clinical findings is essential.         Immature Granulocytes       1.0       INDIRECT KYARA       NEG       INR       1.0  Comment: Coumadin Therapy:  2.0 - 3.0 for INR for all indicators except mechanical heart valves  and antiphospholipid syndromes which should use 2.5 - 3.5.         Ketones, UA   Trace           Leukocytes, UA   Negative           Lymph #       1.2       Lymph %       8.4       Magnesium 1.8       1.4       MCH       30.5       MCHC       32.6       MCV        93       Mono #       1.3       Mono %       9.0       MPV       10.7       NITRITE UA   Negative           nRBC       0       Occult Blood UA   Negative           pH, UA   6.0           Phosphorus       2.2       Platelets       262       Potassium 2.9       2.4  Comment: *Critical value notification by _RS_ with confirmation of receipt to   Kevin Lombardo, RN. at  Date 05/26/2023 Time 22:27.         Prealbumin     31         PROTEIN TOTAL       6.9       Protein, UA   Negative  Comment: Recommend a 24 hour urine protein or a urine   protein/creatinine ratio if globulin induced proteinuria is  clinically suspected.             Protime       10.3       RBC       3.64       RDW       23.9       Sodium 139       133       Specific Chloe, UA   1.010           Specimen Outdate       05/29/2023 23:59       Specimen UA   Urine, Catheterized  Comment: PUREWICK           Transferrin       286       Troponin I       0.019  Comment: The reference interval for Troponin I represents the 99th percentile   cutoff   for our facility and is consistent with 3rd generation assay   performance.         Vit D, 25-Hydroxy     43  Comment: Vitamin D deficiency.........<10 ng/mL                              Vitamin D insufficiency......10-29 ng/mL       Vitamin D sufficiency........> or equal to 30 ng/mL  Vitamin D toxicity............>100 ng/mL           WBC       14.26               Significant Imaging: I have reviewed all pertinent imaging results/findings within the past 24 hours.      Assessment/Plan:      * Left displaced femoral neck fracture  -s/p ground level mechanical fall at home on 5/26 with immediate severe Left hip pain   -imaging showed acute mildly displaced left femoral neck fracture   -seen by orthopedic with plan for OR on 5/28 for IDA  -eliquis post op for 35 days until July 3rd  -no active cardiac issue and denies chest pain or sob   -EKG NSR w/o acute ischemia   -nuclear stress test in 2017 negative for  myocardial ischemia   -no indication for further cardiac testing warranted prior to surgery     Perioperative Risk Assessment:  Patient is at intermediate risk for perioperative cardiopulmonary complications.  Recommend proceed to surgery as planned. We will follow with you during the perioperative period  to manage any active medical issues and prevent postoperative complications.    Perioperative Management Recommendations:   -npo, mIVF and multimodal pain control per hip fracture pathway   -oxycodone and IV morphine prn breakthrough pain   -continue home beta blocker   -SCDs for preop DVT PPX   -postop incentive spirometer use     Leukocytosis  -WBC 14  -likely stress induced in setting of femoral neck fracture  -afebrile and no signs of overt infection   UA negative on admit, procal neg      Preop cardiovascular exam  -see above under femoral neck fracture       Hypophosphatemia  Replace PRN      Hypomagnesemia  -replaced on admit, replace again this AM and repeat at 6 pm      Hypertension  -chronic and controlled   -continue home amlodipine and carvedilol   -holding HCTZ in setting of hypokalemia and hypomagnesemia   -hydralazine prn       Hypokalemia  Replaced in ER and thought to be hctz contributor on admit so held, appetite spotty at times also per family, replace again as low still this AM and recheck 6 pm      Gastroesophageal reflux disease without esophagitis  -continue home PPI       Dyslipidemia  -continue home statin       Coronary artery disease involving native coronary artery of native heart without angina pectoris  -denies chest pain or sob with chronic t wave inversions on EKG slightly more prominent than previous but denies chest pain and trop neg on admit  -continue GDMT with beta blocker and statin         VTE Risk Mitigation (From admission, onward)         Ordered     heparin (porcine) injection 5,000 Units  Every 8 hours         05/27/23 1138     IP VTE HIGH RISK PATIENT  Once          05/26/23 2334     Place sequential compression device  Until discontinued         05/26/23 2334                Discharge Planning   DEBBIE: 5/31/2023     Code Status: Prior   Is the patient medically ready for discharge?:     Reason for patient still in hospital (select all that apply): Patient trending condition                     Angela Nolan MD  Department of Hospital Medicine   [facility] - Surgery

## 2023-05-27 NOTE — ASSESSMENT & PLAN NOTE
-s/p ground level mechanical fall at home on 5/26 with immediate severe Left hip pain   -imaging showed acute mildly displaced left femoral neck fracture   -seen by orthopedic with plan for surgical fixation   -neurovascular intact   -no active cardiac issue and denies chest pain or sob   -EKG NSR w/o acute ischemia   -nuclear stress test in 2017 negative for myocardial ischemia   -no indication for further cardiac testing warranted prior to surgery     Perioperative Risk Assessment:  Patient is at intermediate risk for perioperative cardiopulmonary complications.  Recommend proceed to surgery as planned. We will follow with you during the perioperative period  to manage any active medical issues and prevent postoperative complications.    Perioperative Management Recommendations:   -npo, mIVF and multimodal pain control per hip fracture pathway   -oxycodone and IV morphine prn breakthrough pain   -continue home beta blocker   -SCDs for preop DVT PPX   -postop incentive spirometer use

## 2023-05-27 NOTE — PROVIDER PROGRESS NOTES - EMERGENCY DEPT.
Encounter Date: 5/26/2023    ED Physician Progress Notes          Physician Attestation Statement for Resident:  As the supervising MD   Physician Attestation Statement: I have personally seen and examined this patient.       I agree with the history unless otherwise noted.     As the supervising MD I agree with the PE unless otherwise noted.      I have reviewed and agree with the residents interpretation of the following unless otherwise noted:   I have personally reviewed and interpreted the patients laboratory studies:  I have personally reviewed and interpreted imaging studies: L hip XR with femoral neck fracture  I have personally reviewed and interpreted the patient's EKG:  Normal sinus rhythm at 84 beats per minute, deep T-wave inversions in V4, 5, 6      I have also reviewed the following:   old records at this facility,   EKG with T-wave inversions in V4 5 and 6 though not as prominent as today      As the supervising MD I agree with the treatment, course, plan, and disposition unless otherwise noted.

## 2023-05-27 NOTE — ASSESSMENT & PLAN NOTE
-WBC 14  -likely stress induced in setting of femoral neck fracture  -afebrile and no signs of overt infection   UA negative on admit, procal neg

## 2023-05-27 NOTE — HPI
Patient is a 72-year-old female with past medical history of CAD, SAH, carotid stenosis, GERD, CKD 3, hypertension, gout, and iron-deficiency anemia.  She presented to the ED after a ground level trip and fall over a table while walking to the bathroom with immediate pain and inability to bear weight on her left leg.  She does have a walker that she uses for long distances at baseline, but she says that she is not dependent on this.  She is still active and attends local festival in [place].  She lives with her  and was planning to attend her 55th wedding anniversary last night when she tripped.

## 2023-05-27 NOTE — SUBJECTIVE & OBJECTIVE
Past Medical History:   Diagnosis Date    Allergic rhinitis     Amblyopia     Carotid stenosis     Coronary atherosclerosis     Outside [facility] 2014: 20% mLAD, 50% mCx, 20%, mRCA    Dyslipidemia     ESBL (extended spectrum beta-lactamase) producing bacteria infection     UTI    Hypertension     Nausea and vomiting 2017    Subarachnoid hemorrhage due to ruptured aneurysm        Past Surgical History:   Procedure Laterality Date    ADENOIDECTOMY      ANTERIOR CRUCIATE LIGAMENT REPAIR      APPENDECTOMY      CATARACT EXTRACTION W/  INTRAOCULAR LENS IMPLANT Right 2022    Procedure: EXTRACTION, CATARACT, WITH IOL INSERTION;  Surgeon: Higinio Cristina MD;  Location: [facility] OR;  Service: Ophthalmology;  Laterality: Right;    CATARACT EXTRACTION W/  INTRAOCULAR LENS IMPLANT Left 2022    Procedure: EXTRACTION, CATARACT, WITH IOL INSERTION;  Surgeon: Higinio Cristina MD;  Location: [facility] OR;  Service: Ophthalmology;  Laterality: Left;    CEREBRAL ANEURYSM REPAIR      clip     SECTION      DENTAL SURGERY      EYE SURGERY Bilateral     cataract removal and lens implants    TONSILLECTOMY         Review of patient's allergies indicates:  No Known Allergies    Current Facility-Administered Medications   Medication    acetaminophen tablet 1,000 mg    albuterol-ipratropium 2.5 mg-0.5 mg/3 mL nebulizer solution 3 mL    allopurinoL tablet 200 mg    amLODIPine tablet 10 mg    atorvastatin tablet 80 mg    bisacodyL suppository 10 mg    calcium-vitamin D3 500 mg-5 mcg (200 unit) per tablet 2 tablet    carvediloL tablet 12.5 mg    dextrose 5 % and 0.45 % NaCl with KCl 20 mEq infusion    fluticasone propionate 50 mcg/actuation nasal spray 100 mcg    melatonin tablet 6 mg    methocarbamoL tablet 500 mg    montelukast tablet 10 mg    morphine injection 2 mg    ondansetron injection 4 mg    oxyCODONE immediate release tablet 5 mg    pantoprazole EC tablet 40 mg    [START ON 2023] polyethylene glycol packet 17  g    potassium chloride 10 mEq in 100 mL IVPB    pregabalin capsule 75 mg    sodium chloride 0.9% flush 10 mL    vitamin D 1000 units tablet 2,000 Units     Facility-Administered Medications Ordered in Other Encounters   Medication    sodium hyaluronate (EUFLEXXA) 10 mg/mL(mw 2.4 -3.6 million) injection 20 mg     Family History       Problem Relation (Age of Onset)    Alcohol abuse Father    Aneurysm Mother    Gout Brother    Heart disease Brother    Hypertension Mother, Father    Kidney disease Brother    No Known Problems Daughter, Daughter, Daughter          Tobacco Use    Smoking status: Former     Packs/day: 0.50     Years: 20.00     Pack years: 10.00     Types: Cigarettes     Quit date: 1999     Years since quittin.4     Passive exposure: Past    Smokeless tobacco: Never   Substance and Sexual Activity    Alcohol use: Yes     Alcohol/week: 7.0 standard drinks     Types: 7 Glasses of wine per week    Drug use: No    Sexual activity: Yes     Partners: Male     ROS  Constitutional: negative for fevers/chills/night sweats  Eyes: no acute visual changes  ENT: negative acute  for hearing loss  Respiratory: negative for dyspnea  Cardiovascular: negative for chest pain  Gastrointestinal: negative for abdominal pain  Genitourinary: negative for dysuria  Neurological: negative for headaches  Behavioral/Psych: negative for hallucinations  Endocrine: negative for temperature intolerance  MSK: per HPI  Objective:     Vital Signs (Most Recent):  Temp: 96.4 °F (35.8 °C) (23 0019)  Pulse: 79 (23 0019)  Resp: 18 (23 0118)  BP: 132/63 (23 0019)  SpO2: 96 % (23 2345) Vital Signs (24h Range):  Temp:  [96.4 °F (35.8 °C)-98 °F (36.7 °C)] 96.4 °F (35.8 °C)  Pulse:  [78-97] 79  Resp:  [14-22] 18  SpO2:  [93 %-97 %] 96 %  BP: (126-149)/(60-81) 132/63     Weight: 55.8 kg (123 lb)     Body mass index is 21.79 kg/m².    No intake or output data in the 24 hours ending 23 0148     Ortho/SPM  Exam  General:  no acute distress, appears stated age   Neuro: alert and oriented x3  Psych: normal mood  Head: normocephalic, atraumatic.  Eyes: no scleral icterus  Mouth: moist mucous membranes  Cardiovascular: extremities warm and well perfused  Lungs: breathing comfortably, equal chest rise bilat  Skin: clean, dry, intact (any exceptions noted in below musculoskeletal exam)       Musculoskeletal  Right Upper Extremity     -Skin, Intact : no ecchymosis, lesions, lacerations or abrasions   -Deltoid, biceps, triceps intact   -ROM full range of motion to the shoulder wrist and elbow  -SILT M/R/U/Ax  -Motor intact Ain/PIN/U/Ax  -WWP     Left Upper Extremity     -Skin, Intact : no ecchymosis, lesions, lacerations or abrasions   -Deltoid, biceps, triceps intact   -ROM full range of motion to the shoulder wrist and elbow  -SILT M/R/U/Ax  -Motor intact Ain/PIN/U/Ax  -WWP     Right Lower Extremity Exam     - Skin Intact : no ecchymosis, lesions, lacerations or abrasions   - Quad/ Hip flexor intact   - ROM painless straight leg raise, painless logroll, full range of motion to the hip knee and ankle  - TA/EHL/Gastroc/FHL intact  - SILT throughout  - WWP        Left Lower Extremity Exam    - Skin Intact : no ecchymosis, lesions, lacerations or abrasions   - Quad/ Hip flexor intact   - ROM painful log roll, unable to perform straight leg raise, range of motion to the knee deferred, painless range of motion to the ankle.  - TA/EHL/Gastroc/FHL intact  - SILT throughout  - WWP      All joints (shoulder/elbow/wrist/hip/knee/ankle) were examined and had full ROM and were non-tender to palpation except as above          Significant Labs: CBC:   Recent Labs   Lab 05/25/23  0915 05/26/23  2155   WBC 7.26 14.26*   HGB 11.2* 11.1*   HCT 35.2* 34.0*    262     CMP:   Recent Labs   Lab 05/25/23  0915 05/26/23  2155   * 133*   K 3.0* 2.4*   CL 95 95   CO2 27 23   GLU 91 137*   BUN 20 18   CREATININE 1.2 0.9   CALCIUM 8.2*  8.5*   PROT 6.6 6.9   ALBUMIN 3.7 3.9   BILITOT 0.6 0.7   ALKPHOS 53* 53*   AST 34 44*   ALT 25 34   ANIONGAP 13 15     All pertinent labs within the past 24 hours have been reviewed.    Significant Imaging: I have reviewed and interpreted all pertinent imaging results/findings.  X-ray of the pelvis with a left femoral neck fracture displaced in varus.

## 2023-05-27 NOTE — ANESTHESIA PREPROCEDURE EVALUATION
[facility]  Anesthesia Pre-Operative Evaluation   05/27/2023        Jessica Floyd, 1950  [id number]  Procedure(s) (LRB):  ARTHROPLASTY, HIP: Left, lateral on Peg Board, [place] (Left)    Subjective    Jessica Floyd is a 72 y.o. female w/ a significant PMHx of HTN, CAD, SAH 2/2 aneurysm s/p clipping,GERD who with left femor fracture 2/2 to mechanical fall. Now plans for above.     Patient now presents for above procedure(s).     Prev Airway: None documented.    Nuclear stress test 2017  EKG Conclusions:     1. The EKG portion of this study is abnormal but non diagnostic for ischemia at a peak heart rate of 110 bpm (75% of predicted).   2. Specificity is reduced secondary to resting ST segment changes.   3. Blood pressure remained stable throughout the protocol  (Presenting BP: 125/79 Peak BP: 163/85).   4. No significant arrhythmias were present.   5. There were no symptoms of chest discomfort or significant dyspnea throughout the protocol.      TTE 2017  CONCLUSIONS     1 - Concentric remodeling.     2 - Normal left ventricular systolic function (EF 55-60%).     3 - Normal right ventricular systolic function .     4 - Normal left ventricular diastolic function.     5 - The estimated PA systolic pressure is 32 mmHg.     6 - No wall motion abnormalities.     LDA:        Peripheral IV - Single Lumen 05/26/23 2136 20 G Right Antecubital (Active)   Site Assessment Clean;Dry;Intact 05/27/23 0400   Extremity Assessment Distal to IV No abnormal discoloration;No redness;No swelling;No warmth 05/27/23 0400   Line Status Infusing 05/27/23 0400   Dressing Status Clean;Dry;Intact 05/27/23 0400   Dressing Intervention Integrity maintained 05/27/23 0400   Number of days: 0       Drips:    dextrose 5 % and 0.45 % NaCl with KCl 20 mEq 50 mL/hr at 05/27/23 0101       Patient Active Problem List   Diagnosis    Coronary artery disease involving native coronary artery of native heart without angina pectoris     SAH (subarachnoid hemorrhage)    Dyslipidemia    Bilateral carotid artery stenosis    Gastroesophageal reflux disease without esophagitis    Stage 3a chronic kidney disease    Hypokalemia    Hypertension    Subclinical hyperthyroidism    Allergic rhinitis    Nuclear sclerotic cataract of left eye    Traumatic complete tear of right rotator cuff    Localized primary osteoarthritis of right shoulder region [M19.011 (ICD-10-CM)]    Iron deficiency anemia secondary to blood loss (chronic)    Left displaced femoral neck fracture    Hypomagnesemia    Hypophosphatemia    Preop cardiovascular exam    Leukocytosis       Review of patient's allergies indicates:  No Known Allergies    Current Inpatient Medications:    acetaminophen  1,000 mg Oral Q8H    allopurinoL  200 mg Oral Daily    amLODIPine  10 mg Oral Daily    atorvastatin  80 mg Oral Daily    calcium-vitamin D3  2 tablet Oral Daily    carvediloL  12.5 mg Oral BID WM    fluticasone propionate  2 spray Each Nostril Daily    heparin (porcine)  5,000 Units Subcutaneous Q8H    montelukast  10 mg Oral QHS    pantoprazole  40 mg Oral Daily    [START ON 5/28/2023] polyethylene glycol  17 g Oral Daily    potassium bicarbonate  40 mEq Oral Q3H    pregabalin  75 mg Oral QHS    vitamin D  2,000 Units Oral Daily       Current Facility-Administered Medications on File Prior to Encounter   Medication Dose Route Frequency Provider Last Rate Last Admin    sodium hyaluronate (EUFLEXXA) 10 mg/mL(mw 2.4 -3.6 million) injection 20 mg  20 mg Intra-articular  Armani Cai PA-C   20 mg at 11/14/22 0830     Current Outpatient Medications on File Prior to Encounter   Medication Sig Dispense Refill    allopurinoL (ZYLOPRIM) 100 MG tablet Take 2 tablets (200 mg total) by mouth once daily. 60 tablet 11    amLODIPine (NORVASC) 10 MG tablet Take 1 tablet (10 mg total) by mouth once daily. 90 tablet 3    atorvastatin (LIPITOR) 80 MG tablet TAKE 1 TABLET BY  MOUTH EVERY DAY 90 tablet 3    carvediloL (COREG) 12.5 MG tablet Take 1 tablet (12.5 mg total) by mouth 2 (two) times daily with meals. 180 tablet 3    colchicine (MITIGARE) 0.6 mg Cap Take 1 capsule (0.6 mg total) by mouth once daily. 30 capsule 11    fluticasone propionate (FLONASE) 50 mcg/actuation nasal spray SPRAY ONCE INTO EACH NOSTRIL ONCE DAILY 16 mL 3    hydroCHLOROthiazide (HYDRODIURIL) 25 MG tablet TAKE 1 TABLET BY MOUTH EVERY DAY 90 tablet 3    hydrocortisone 2.5 % ointment as needed.      ibuprofen (ADVIL,MOTRIN) 600 MG tablet Take 1 tablet (600 mg total) by mouth every 6 (six) hours as needed for Pain. 20 tablet 0    ketoconazole (NIZORAL) 2 % cream APPLY BETWEEN TOES TWICE DAILY X 2 WKS      LIDOcaine (LIDODERM) 5 % Place 1 patch onto the skin once daily. Remove & Discard patch within 12 hours or as directed by MD 15 patch 0    montelukast (SINGULAIR) 10 mg tablet Take 1 tablet (10 mg total) by mouth every evening. 90 tablet 11    pantoprazole (PROTONIX) 40 MG tablet TAKE 1 TABLET BY MOUTH EVERY DAY 90 tablet 0       Past Surgical History:   Procedure Laterality Date    ADENOIDECTOMY      ANTERIOR CRUCIATE LIGAMENT REPAIR      APPENDECTOMY      CATARACT EXTRACTION W/  INTRAOCULAR LENS IMPLANT Right 2022    Procedure: EXTRACTION, CATARACT, WITH IOL INSERTION;  Surgeon: Higinio Cristina MD;  Location: [facility] OR;  Service: Ophthalmology;  Laterality: Right;    CATARACT EXTRACTION W/  INTRAOCULAR LENS IMPLANT Left 2022    Procedure: EXTRACTION, CATARACT, WITH IOL INSERTION;  Surgeon: Higinio Cristina MD;  Location: [facility];  Service: Ophthalmology;  Laterality: Left;    CEREBRAL ANEURYSM REPAIR      clip     SECTION      DENTAL SURGERY      EYE SURGERY Bilateral     cataract removal and lens implants    TONSILLECTOMY         Social History:  Tobacco Use: Medium Risk    Smoking Tobacco Use: Former    Smokeless Tobacco Use: Never    Passive Exposure: Past        Alcohol Use: Not At Risk    Frequency of Alcohol Consumption: Never    Average Number of Drinks: Patient does not drink    Frequency of Binge Drinking: Never       Objective    Vital Signs Range:  BMI Readings from Last 1 Encounters:   05/26/23 21.79 kg/m²       Temp:  [36.2 °C (97.2 °F)-36.5 °C (97.7 °F)]   Pulse:  [72-78]   Resp:  [16-18]   BP: (112-149)/(68-69)   SpO2:  [84 %-89 %]        Significant Labs:        Component Value Date/Time    WBC 14.26 (H) 05/26/2023 2155    HGB 11.1 (L) 05/26/2023 2155    HCT 34.0 (L) 05/26/2023 2155     05/26/2023 2155     05/27/2023 0803    K 2.9 (L) 05/27/2023 0803    CL 99 05/27/2023 0803    CO2 30 (H) 05/27/2023 0803     05/27/2023 0803    BUN 11 05/27/2023 0803    CREATININE 0.7 05/27/2023 0803    MG 1.8 05/27/2023 0803    PHOS 2.2 (L) 05/26/2023 2155    CALCIUM 8.3 (L) 05/27/2023 0803    ALBUMIN 3.9 05/26/2023 2155    PROT 6.9 05/26/2023 2155    ALKPHOS 53 (L) 05/26/2023 2155    BILITOT 0.7 05/26/2023 2155    AST 44 (H) 05/26/2023 2155    ALT 34 05/26/2023 2155    INR 1.0 05/26/2023 2155    HGBA1C 4.9 05/26/2023 2155        Please see Results Review for additional labs.     Diagnostic Studies: All relevant studies, reviewed.      EKG:   Results for orders placed or performed during the hospital encounter of 05/26/23   EKG 12-lead    Collection Time: 05/26/23  9:59 PM    Narrative    Test Reason : R09.02,    Vent. Rate : 084 BPM     Atrial Rate : 084 BPM     P-R Int : 158 ms          QRS Dur : 092 ms      QT Int : 428 ms       P-R-T Axes : 086 016 -79 degrees     QTc Int : 505 ms    Sinus rhythm with marked sinus arrythmia  Incomplete right bundle branch block  Low septal forces  ST and T wave abnormality, consider inferolateral ischemia  Abnormal ECG  When compared with ECG of 02-MAR-2023 10:04,  T wave inversion more evident in Lateral leads  Confirmed by Emeka CARMEN MD ([id number]) on 5/27/2023 8:22:12 AM    Referred By: [email or web address]   SELF            "Confirmed By:Emeka CARMEN MD       ECHO:  No results found for this or any previous visit.      Pre-op Assessment    I have reviewed the Patient Summary Reports.     I have reviewed the Nursing Notes.    I have reviewed the Medications.     Review of Systems  Anesthesia Hx:  Denies Family Hx of Anesthesia complications.   Denies Personal Hx of Anesthesia complications.   Social:  Former Smoker    Hematology/Oncology:  Hematology Normal   Oncology Normal     Cardiovascular:   Hypertension CAD   PVD hyperlipidemia    Pulmonary:  Pulmonary Normal    Renal/:   Chronic Renal Disease, CKD    Hepatic/GI:   GERD    Musculoskeletal:   Arthritis     Neurological:   H/O CEREBRAL Aneureysm s/p clipping   Endocrine:   Hyperthyroidism        Physical Exam  General: Cooperative, Alert and Oriented    Airway:  Mallampati: II   Mouth Opening: Normal  TM Distance: Normal  Tongue: Normal  Neck ROM: Normal ROM    Dental:  Caps / Implants        Anesthesia Plan  Type of Anesthesia, risks & benefits discussed:    Anesthesia Type: Gen ETT, Regional  Intra-op Monitoring Plan: Standard ASA Monitors  Post Op Pain Control Plan: IV/PO Opioids PRN, multimodal analgesia and peripheral nerve block  Induction:  IV  Airway Plan: Direct, Post-Induction  Informed Consent: Informed consent signed with the Patient and all parties understand the risks and agree with anesthesia plan.  All questions answered.   ASA Score: 3  Day of Surgery Review of History & Physical: H&P Update referred to the surgeon/provider.  Anesthesia Plan Notes: NPO confirmed.  Sats 80s upon arrival to [facility], nurse suspects TISHA. Patient improved on 2L NC, reports breathing "comfortable" & appears in no distress.  Single shot block in OR.     Ready For Surgery From Anesthesia Perspective.     .      "

## 2023-05-27 NOTE — HPI
Jessica Floyd is a 72 year old female with PMH of HTN, CAD, SAH 2/2 aneurysm s/p clipping, right shoulder DJD and rotator cuff tear, GERD who presented to ED via [facility] from home for evaluation of left hip and leg pain after a mechanical fall from standing height. Pt states that she was walking to bathroom and tripped over a folding table and fell to hardwood floor around 6 pm this evening. Patient landed on left hip with immediate severe left hip and leg pain. The fall witnessed by her  who called EMS as patient was unable to get up or bear any weight on left side. Patient denies hitting her head, LOC, bleeding on site, chest pain, palpitation, dizziness, sob, focal weakness or numbness associated with fall.  Pt given 55 mcg fentanyl IM by EMS prior to arrival. Imaging showed acute mildly displaced left femoral neck fracture. Patient seen by orthopedic with plan for surgical fixation.      ED course: afebrile and vitals stable. Imaging showed acute mildly displaced left femoral neck fracture. EKG NSR with Q waves in septal leads VI-VII, TWI V4-6 (old). Labs significant for WBC 14, K 2.4, Mg 1.4 and phos 2.2. Patient received morphine, zofran, toradol, 40 meq oral KCL, magnesium sulfate and 1L NS bolus in ED. During my interview patient is awake and her  is present at bedside. She reports severe muscle spasm and left hip pain with minimal movement. Patient states at baseline she cane or walker sometime to avoid falls. She goes to physical therapy for degenerative R shoulder rotator cuff tear with improving strength and range of motion. She walks around the house with her 5 year old granddaughter without chest pain or dyspnea. She denies fever, chills, cough, N/V/D/abdominal pain, dysuria or hematuria. She denies taking blood thinner. She does not smoke, but drinks couple of glasses of wine with dinner.

## 2023-05-27 NOTE — PLAN OF CARE
Problem: Infection  Goal: Absence of Infection Signs and Symptoms  Outcome: Ongoing, Progressing     Problem: Adult Inpatient Plan of Care  Goal: Plan of Care Review  Outcome: Ongoing, Progressing  Goal: Patient-Specific Goal (Individualized)  Outcome: Ongoing, Progressing  Goal: Absence of Hospital-Acquired Illness or Injury  Outcome: Ongoing, Progressing  Goal: Optimal Comfort and Wellbeing  Outcome: Ongoing, Progressing  Goal: Readiness for Transition of Care  Outcome: Ongoing, Progressing     Problem: Adjustment to Injury (Hip Fracture Medical Management)  Goal: Optimal Coping with Change in Health Status  Outcome: Ongoing, Progressing     Problem: Bleeding (Hip Fracture Medical Management)  Goal: Absence of Bleeding  Outcome: Ongoing, Progressing     Problem: Bowel Elimination Impaired (Hip Fracture Medical Management)  Goal: Effective Bowel Elimination  Outcome: Ongoing, Progressing     Problem: Cognitive Decline Risk (Hip Fracture Medical Management)  Goal: Baseline Cognitive Function Maintained  Outcome: Ongoing, Progressing     Problem: Embolism (Hip Fracture Medical Management)  Goal: Absence of Embolism  Outcome: Ongoing, Progressing     Problem: Fracture Stabilization and Management (Hip Fracture Medical Management)  Goal: Fracture Stability  Outcome: Ongoing, Progressing     Problem: Functional Ability Impaired (Hip Fracture Medical Management)  Goal: Optimal Functional Performance  Outcome: Ongoing, Progressing     Problem: Pain (Hip Fracture Medical Management)  Goal: Acceptable Pain Level  Outcome: Ongoing, Progressing     Problem: Urinary Elimination Impaired (Hip Fracture Medical Management)  Goal: Effective Urinary Elimination  Outcome: Ongoing, Progressing     Problem: Bleeding (Surgery Nonspecified)  Goal: Absence of Bleeding  Outcome: Ongoing, Progressing     Problem: Bowel Motility Impaired (Surgery Nonspecified)  Goal: Effective Bowel Elimination  Outcome: Ongoing, Progressing     Problem:  Fluid and Electrolyte Imbalance (Surgery Nonspecified)  Goal: Fluid and Electrolyte Balance  Outcome: Ongoing, Progressing     Problem: Glycemic Control Impaired (Surgery Nonspecified)  Goal: Blood Glucose Level Within Targeted Range  Outcome: Ongoing, Progressing     Problem: Infection (Surgery Nonspecified)  Goal: Absence of Infection Signs and Symptoms  Outcome: Ongoing, Progressing     Problem: Ongoing Anesthesia Effects (Surgery Nonspecified)  Goal: Anesthesia/Sedation Recovery  Outcome: Ongoing, Progressing     Problem: Pain (Surgery Nonspecified)  Goal: Acceptable Pain Control  Outcome: Ongoing, Progressing     Problem: Postoperative Nausea and Vomiting (Surgery Nonspecified)  Goal: Nausea and Vomiting Relief  Outcome: Ongoing, Progressing     Problem: Postoperative Urinary Retention (Surgery Nonspecified)  Goal: Effective Urinary Elimination  Outcome: Ongoing, Progressing     Problem: Respiratory Compromise (Surgery Nonspecified)  Goal: Effective Oxygenation and Ventilation  Outcome: Ongoing, Progressing     Problem: Device-Related Complication Risk (Anesthesia/Analgesia, Neuraxial)  Goal: Safe Infusion Delivery Completion  Outcome: Ongoing, Progressing     Problem: Infection (Anesthesia/Analgesia, Neuraxial)  Goal: Absence of Infection Signs and Symptoms  Outcome: Ongoing, Progressing     Problem: Nausea and Vomiting (Anesthesia/Analgesia, Neuraxial)  Goal: Nausea and Vomiting Relief  Outcome: Ongoing, Progressing     Problem: Pain (Anesthesia/Analgesia, Neuraxial)  Goal: Effective Pain Control  Outcome: Ongoing, Progressing     Problem: Respiratory Compromise (Anesthesia/Analgesia, Neuraxial)  Goal: Effective Oxygenation and Ventilation  Outcome: Ongoing, Progressing     Problem: Sensorimotor Impairment (Anesthesia/Analgesia, Neuraxial)  Goal: Baseline Motor Function  Outcome: Ongoing, Progressing     Problem: Urinary Retention (Anesthesia/Analgesia, Neuraxial)  Goal: Effective Urinary  Elimination  Outcome: Ongoing, Progressing     Problem: Fall Injury Risk  Goal: Absence of Fall and Fall-Related Injury  Outcome: Ongoing, Progressing     Problem: Skin Injury Risk Increased  Goal: Skin Health and Integrity  Outcome: Ongoing, Progressing

## 2023-05-27 NOTE — CONSULTS
[facility]  Orthopedics  Consult Note    Patient Name: Jessica Floyd  MRN: [id number]  Admission Date: 5/26/2023  Hospital Length of Stay: 1 days  Attending Provider: Angela Nolan MD  Primary Care Provider: Effie Olmedo MD      Inpatient consult to Orthopedics  Consult performed by: Rich Shetty MD  Consult ordered by: Baljinder Alex Jr., MD        Subjective:     Principal Problem:Left displaced femoral neck fracture    Chief Complaint:   Chief Complaint   Patient presents with    Fall     Mechanical fall. Left leg short and rotated        HPI: Patient is a 72-year-old female with past medical history of CAD, SAH, carotid stenosis, GERD, CKD 3, hypertension, gout, and iron-deficiency anemia.  She presented to the ED after a ground level trip and fall over a table while walking to the bathroom with immediate pain and inability to bear weight on her left leg.  She does have a walker that she uses for long distances at baseline, but she says that she is not dependent on this.  She is still active and attends local festival in [place].  She lives with her  and was planning to attend her 55th wedding anniversary last night when she tripped.         Past Medical History:   Diagnosis Date    Allergic rhinitis     Amblyopia     Carotid stenosis     Coronary atherosclerosis     Outside [facility] 6/2014: 20% mLAD, 50% mCx, 20%, mRCA    Dyslipidemia     ESBL (extended spectrum beta-lactamase) producing bacteria infection     UTI    Hypertension     Nausea and vomiting 05/26/2017    Subarachnoid hemorrhage due to ruptured aneurysm 1999       Past Surgical History:   Procedure Laterality Date    ADENOIDECTOMY      ANTERIOR CRUCIATE LIGAMENT REPAIR  2012    APPENDECTOMY      CATARACT EXTRACTION W/  INTRAOCULAR LENS IMPLANT Right 11/07/2022    Procedure: EXTRACTION, CATARACT, WITH IOL INSERTION;  Surgeon: Higinio Cristina MD;  [facility];  Service: Ophthalmology;  Laterality: Right;     CATARACT EXTRACTION W/  INTRAOCULAR LENS IMPLANT Left 2022    Procedure: EXTRACTION, CATARACT, WITH IOL INSERTION;  Surgeon: Higinio Cristina MD;  Location: [facility];  Service: Ophthalmology;  Laterality: Left;    CEREBRAL ANEURYSM REPAIR      clip     SECTION      DENTAL SURGERY      EYE SURGERY Bilateral     cataract removal and lens implants    TONSILLECTOMY         Review of patient's allergies indicates:  No Known Allergies    Current Facility-Administered Medications   Medication    acetaminophen tablet 1,000 mg    albuterol-ipratropium 2.5 mg-0.5 mg/3 mL nebulizer solution 3 mL    allopurinoL tablet 200 mg    amLODIPine tablet 10 mg    atorvastatin tablet 80 mg    bisacodyL suppository 10 mg    calcium-vitamin D3 500 mg-5 mcg (200 unit) per tablet 2 tablet    carvediloL tablet 12.5 mg    dextrose 5 % and 0.45 % NaCl with KCl 20 mEq infusion    fluticasone propionate 50 mcg/actuation nasal spray 100 mcg    melatonin tablet 6 mg    methocarbamoL tablet 500 mg    montelukast tablet 10 mg    morphine injection 2 mg    ondansetron injection 4 mg    oxyCODONE immediate release tablet 5 mg    pantoprazole EC tablet 40 mg    [START ON 2023] polyethylene glycol packet 17 g    potassium chloride 10 mEq in 100 mL IVPB    pregabalin capsule 75 mg    sodium chloride 0.9% flush 10 mL    vitamin D 1000 units tablet 2,000 Units     Facility-Administered Medications Ordered in Other Encounters   Medication    sodium hyaluronate (EUFLEXXA) 10 mg/mL(mw 2.4 -3.6 million) injection 20 mg     Family History       Problem Relation (Age of Onset)    Alcohol abuse Father    Aneurysm Mother    Gout Brother    Heart disease Brother    Hypertension Mother, Father    Kidney disease Brother    No Known Problems Daughter, Daughter, Daughter          Tobacco Use    Smoking status: Former     Packs/day: 0.50     Years: 20.00     Pack years: 10.00     Types: Cigarettes     Quit date: 1999      Years since quittin.4     Passive exposure: Past    Smokeless tobacco: Never   Substance and Sexual Activity    Alcohol use: Yes     Alcohol/week: 7.0 standard drinks     Types: 7 Glasses of wine per week    Drug use: No    Sexual activity: Yes     Partners: Male     ROS  Constitutional: negative for fevers/chills/night sweats  Eyes: no acute visual changes  ENT: negative acute  for hearing loss  Respiratory: negative for dyspnea  Cardiovascular: negative for chest pain  Gastrointestinal: negative for abdominal pain  Genitourinary: negative for dysuria  Neurological: negative for headaches  Behavioral/Psych: negative for hallucinations  Endocrine: negative for temperature intolerance  MSK: per HPI  Objective:     Vital Signs (Most Recent):  Temp: 96.4 °F (35.8 °C) (23 0019)  Pulse: 79 (23 0019)  Resp: 18 (23 0118)  BP: 132/63 (23 0019)  SpO2: 96 % (23 2345) Vital Signs (24h Range):  Temp:  [96.4 °F (35.8 °C)-98 °F (36.7 °C)] 96.4 °F (35.8 °C)  Pulse:  [78-97] 79  Resp:  [14-22] 18  SpO2:  [93 %-97 %] 96 %  BP: (126-149)/(60-81) 132/63     Weight: 55.8 kg (123 lb)     Body mass index is 21.79 kg/m².    No intake or output data in the 24 hours ending 23 0148     Ortho/SPM Exam  General:  no acute distress, appears stated age   Neuro: alert and oriented x3  Psych: normal mood  Head: normocephalic, atraumatic.  Eyes: no scleral icterus  Mouth: moist mucous membranes  Cardiovascular: extremities warm and well perfused  Lungs: breathing comfortably, equal chest rise bilat  Skin: clean, dry, intact (any exceptions noted in below musculoskeletal exam)       Musculoskeletal  Right Upper Extremity     -Skin, Intact : no ecchymosis, lesions, lacerations or abrasions   -Deltoid, biceps, triceps intact   -ROM full range of motion to the shoulder wrist and elbow  -SILT M/R/U/Ax  -Motor intact Ain/PIN/U/Ax  -WWP     Left Upper Extremity     -Skin, Intact : no ecchymosis, lesions,  lacerations or abrasions   -Deltoid, biceps, triceps intact   -ROM full range of motion to the shoulder wrist and elbow  -SILT M/R/U/Ax  -Motor intact Ain/PIN/U/Ax  -WWP     Right Lower Extremity Exam     - Skin Intact : no ecchymosis, lesions, lacerations or abrasions   - Quad/ Hip flexor intact   - ROM painless straight leg raise, painless logroll, full range of motion to the hip knee and ankle  - TA/EHL/Gastroc/FHL intact  - SILT throughout  - WWP        Left Lower Extremity Exam    - Skin Intact : no ecchymosis, lesions, lacerations or abrasions   - Quad/ Hip flexor intact   - ROM painful log roll, unable to perform straight leg raise, range of motion to the knee deferred, painless range of motion to the ankle.  - TA/EHL/Gastroc/FHL intact  - SILT throughout  - WWP      All joints (shoulder/elbow/wrist/hip/knee/ankle) were examined and had full ROM and were non-tender to palpation except as above          Significant Labs: CBC:   Recent Labs   Lab 05/25/23  0915 05/26/23  2155   WBC 7.26 14.26*   HGB 11.2* 11.1*   HCT 35.2* 34.0*    262     CMP:   Recent Labs   Lab 05/25/23  0915 05/26/23  2155   * 133*   K 3.0* 2.4*   CL 95 95   CO2 27 23   GLU 91 137*   BUN 20 18   CREATININE 1.2 0.9   CALCIUM 8.2* 8.5*   PROT 6.6 6.9   ALBUMIN 3.7 3.9   BILITOT 0.6 0.7   ALKPHOS 53* 53*   AST 34 44*   ALT 25 34   ANIONGAP 13 15     All pertinent labs within the past 24 hours have been reviewed.    Significant Imaging: I have reviewed and interpreted all pertinent imaging results/findings.  X-ray of the pelvis with a left femoral neck fracture displaced in varus.    Assessment/Plan:     * Left displaced femoral neck fracture  Jessica Floyd is a 72 y.o. female with left FNF fracture, closed, NVI. They take no anticoagulation at home. They required walker for ambulatory assistive devices with long distacnes but ambulate independently for short/moderate distances mostly due to arthritis pain in the hips and knees. She  estimates that she can walk at least 3 blocks without the walker    -Admitted to medicine hip fracture service for pre-operative clearance and medical evaluation  -To OR likely 5/28/23 for IDA   -Pt marked and consented for surgery  -DVT PPx: Hold anticoagulation  -Abx: Preop abx ordered  -Labs: Prealbumin 31, UA pending, Glucose 137, Albumin 3.9, Hemoglobin 11.1, Hematocrit 34, Platelets 262, White blood cell count 14, A1c pending, INR 1. Other lab results pending.  -Bed rest, abreu, NPO at midnight  -Iv: ordered for contralateral arm    Patient was explained in detail the severity of the injury that was suffered. Patient was explained the risks/benefits/and alternatives to operative management in detail including infection, bleeding, pain, nerve and vascular damage, heterotopic ossification, leg length discrepancies, rotational deformities and they express full understanding.  We discussed the 30% national first year mortality rate with hip fractures, the need for early mobilization, and the expected rehab course.  She expresses full understanding of the condition and expresses that they want to proceed with surgery. The patient is admitted to the medicine hip fracture service for optimization of medical comorbidities. Will plan for OR likely 5/28/23. No guarantees were made, informed consent was obtained. All questions were answered to patient's and family's satisfaction.           Rich Shetty MD  Orthopedics  [facility] - Surgery

## 2023-05-27 NOTE — SUBJECTIVE & OBJECTIVE
Interval history- seen this morning with daughter at bedside and in good spirits. She reports pain controlled right now while sitting in bed but had some before receiving pain meds overnight. Very acive at baseline, walks around with family and granddaughter and so ortho planning for IDA tomorrow. Messaged dr lara who will come by or call daughter to talk to her about surgical questions she has for their team. She has a low appetite at times at home as gets full quickly and daughter is going to bring her meals from outside hospital later today to help with that. Her K and Mag were low on admit and hctz held on admit per night tem recs as possible contributor, she reports were low once before when had severe nosebleeding issue but not aware of other instances. Replaced overnight but still low on labs so replace K and Mag again today and then recheck at 6 pm to ensure repleted before OR tomorrow. Receives Iron injctions long term for ERIK with hg of 11 on admission. Has no margaret pain or dyspnea, has chronic t wave inversions in lateral leads on EKG a little more than previous EKGS but trop negative and no chest pain on admit. Patient in good spirits and joking with family and granddaughter plans to come visit later today as well which she is looking forward to        Review of patient's allergies indicates:  No Known Allergies    Current Facility-Administered Medications on File Prior to Encounter   Medication    sodium hyaluronate (EUFLEXXA) 10 mg/mL(mw 2.4 -3.6 million) injection 20 mg     Current Outpatient Medications on File Prior to Encounter   Medication Sig    allopurinoL (ZYLOPRIM) 100 MG tablet Take 2 tablets (200 mg total) by mouth once daily.    amLODIPine (NORVASC) 10 MG tablet Take 1 tablet (10 mg total) by mouth once daily.    atorvastatin (LIPITOR) 80 MG tablet TAKE 1 TABLET BY MOUTH EVERY DAY    carvediloL (COREG) 12.5 MG tablet Take 1 tablet (12.5 mg total) by mouth 2 (two) times daily with meals.     colchicine (MITIGARE) 0.6 mg Cap Take 1 capsule (0.6 mg total) by mouth once daily.    fluticasone propionate (FLONASE) 50 mcg/actuation nasal spray SPRAY ONCE INTO EACH NOSTRIL ONCE DAILY    hydroCHLOROthiazide (HYDRODIURIL) 25 MG tablet TAKE 1 TABLET BY MOUTH EVERY DAY    hydrocortisone 2.5 % ointment as needed.    ibuprofen (ADVIL,MOTRIN) 600 MG tablet Take 1 tablet (600 mg total) by mouth every 6 (six) hours as needed for Pain.    ketoconazole (NIZORAL) 2 % cream APPLY BETWEEN TOES TWICE DAILY X 2 WKS    LIDOcaine (LIDODERM) 5 % Place 1 patch onto the skin once daily. Remove & Discard patch within 12 hours or as directed by MD    montelukast (SINGULAIR) 10 mg tablet Take 1 tablet (10 mg total) by mouth every evening.    pantoprazole (PROTONIX) 40 MG tablet TAKE 1 TABLET BY MOUTH EVERY DAY    [DISCONTINUED] montelukast (SINGULAIR) 10 mg tablet Take 1 tablet (10 mg total) by mouth every evening.     Family History       Problem Relation (Age of Onset)    Alcohol abuse Father    Aneurysm Mother    Gout Brother    Heart disease Brother    Hypertension Mother, Father    Kidney disease Brother    No Known Problems Daughter, Daughter, Daughter          Tobacco Use    Smoking status: Former     Packs/day: 0.50     Years: 20.00     Pack years: 10.00     Types: Cigarettes     Quit date: 1999     Years since quittin.4     Passive exposure: Past    Smokeless tobacco: Never   Substance and Sexual Activity    Alcohol use: Yes     Alcohol/week: 7.0 standard drinks     Types: 7 Glasses of wine per week    Drug use: No    Sexual activity: Yes     Partners: Male     Review of Systems   Constitutional:  Negative for activity change, appetite change, chills, diaphoresis, fatigue and fever.   HENT:  Negative for congestion, dental problem, drooling, ear discharge, ear pain, facial swelling, hearing loss, mouth sores, nosebleeds, postnasal drip, rhinorrhea, sinus pressure, sneezing, sore throat, tinnitus, trouble  swallowing and voice change.    Eyes:  Negative for photophobia, pain, discharge, redness, itching and visual disturbance.   Respiratory:  Negative for apnea, cough, choking, chest tightness, shortness of breath, wheezing and stridor.    Cardiovascular:  Negative for chest pain, palpitations and leg swelling.   Gastrointestinal:  Negative for abdominal distention, abdominal pain, anal bleeding, blood in stool, constipation, diarrhea, nausea, rectal pain and vomiting.   Endocrine: Negative for cold intolerance, heat intolerance, polydipsia, polyphagia and polyuria.   Genitourinary:  Negative for decreased urine volume, difficulty urinating, dyspareunia, dysuria, enuresis, flank pain, frequency, genital sores, hematuria, menstrual problem, pelvic pain, urgency, vaginal bleeding, vaginal discharge and vaginal pain.   Musculoskeletal:  Positive for arthralgias (chronic R shoulder pain and acute left hip pain), gait problem (baseline line unsteady gait) and myalgias (Left thigh and leg). Negative for back pain, joint swelling, neck pain and neck stiffness.   Skin:  Negative for color change, pallor, rash and wound.   Allergic/Immunologic: Negative for environmental allergies, food allergies and immunocompromised state.   Neurological:  Negative for dizziness, tremors, seizures, syncope, facial asymmetry, speech difficulty, weakness, light-headedness, numbness and headaches.   Hematological:  Negative for adenopathy. Does not bruise/bleed easily.   Psychiatric/Behavioral:  Negative for agitation, behavioral problems, confusion, decreased concentration, dysphoric mood, hallucinations, self-injury, sleep disturbance and suicidal ideas. The patient is not nervous/anxious and is not hyperactive.    Objective:     Vital Signs (Most Recent):  Temp: 97.2 °F (36.2 °C) (05/27/23 1128)  Pulse: 72 (05/27/23 1128)  Resp: 18 (05/27/23 1201)  BP: 112/68 (05/27/23 1128)  SpO2: (!) 88 % (05/27/23 1128) Vital Signs (24h Range):  Temp:   [96.4 °F (35.8 °C)-98 °F (36.7 °C)] 97.2 °F (36.2 °C)  Pulse:  [72-97] 72  Resp:  [14-22] 18  SpO2:  [88 %-97 %] 88 %  BP: (112-149)/(60-81) 112/68     Weight: 55.8 kg (123 lb)  Body mass index is 21.79 kg/m².     Physical Exam  Constitutional:       General: She is not in acute distress.     Appearance: She is well-developed. She is not diaphoretic.   HENT:      Head: Normocephalic and atraumatic.      Right Ear: External ear normal.      Left Ear: External ear normal.      Nose: Nose normal.      Mouth/Throat:      Pharynx: No oropharyngeal exudate.   Eyes:      General: No scleral icterus.     Conjunctiva/sclera: Conjunctivae normal.      Pupils: Pupils are equal, round, and reactive to light.   Neck:      Thyroid: No thyromegaly.      Vascular: No JVD.      Trachea: No tracheal deviation.   Cardiovascular:      Rate and Rhythm: Normal rate and regular rhythm.      Pulses:           Dorsalis pedis pulses are 2+ on the right side and 2+ on the left side.        Posterior tibial pulses are 2+ on the right side and 2+ on the left side.      Heart sounds: Normal heart sounds. No murmur heard.    No gallop.   Pulmonary:      Effort: Pulmonary effort is normal. No respiratory distress.      Breath sounds: Normal breath sounds. No wheezing or rales.   Chest:      Chest wall: No tenderness.   Abdominal:      General: Bowel sounds are normal. There is no distension.      Palpations: Abdomen is soft. There is no mass.      Tenderness: There is no abdominal tenderness. There is no guarding or rebound.   Genitourinary:     Vagina: No vaginal discharge.   Musculoskeletal:         General: Tenderness (TTP over Left hip. No hematoma or ecchymosis) and deformity (Left leg shortened and externally rotated.) present.      Cervical back: Neck supple.      Comments: Unable to lift left leg due to hip fracture and pain    Lymphadenopathy:      Cervical: No cervical adenopathy.   Skin:     General: Skin is warm and dry.       Coloration: Skin is not pale.      Findings: No erythema or rash.   Neurological:      Mental Status: She is alert and oriented to person, place, and time.      Cranial Nerves: No cranial nerve deficit.      Motor: No abnormal muscle tone.      Coordination: Coordination normal.      Deep Tendon Reflexes: Reflexes are normal and symmetric. Reflexes normal.   Psychiatric:         Behavior: Behavior normal.         Thought Content: Thought content normal.         Judgment: Judgment normal.            CRANIAL NERVES     CN III, IV, VI   Pupils are equal, round, and reactive to light.     Significant Labs: All pertinent labs within the past 24 hours have been reviewed.  Recent Lab Results         05/27/23  0803   05/27/23  0445   05/26/23  2314   05/26/23  2155        Procalcitonin     0.10  Comment: A concentration < 0.25 ng/mL represents a low risk of bacterial   infection.  Procalcitonin may not be accurate among patients with localized   infection, recent trauma or major surgery, immunosuppressed state,   invasive fungal infection, renal dysfunction. Decisions regarding   initiation or continuation of antibiotic therapy should not be based   solely on procalcitonin levels.           Albumin       3.9       Alkaline Phosphatase       53       ALT       34       Anion Gap 10       15       Appearance, UA   Clear           AST       44       Baso #       0.06       Basophil %       0.4       Bilirubin (UA)   Negative           BILIRUBIN TOTAL       0.7  Comment: For infants and newborns, interpretation of results should be based  on gestational age, weight and in agreement with clinical  observations.    Premature Infant recommended reference ranges:  Up to 24 hours.............<8.0 mg/dL  Up to 48 hours............<12.0 mg/dL  3-5 days..................<15.0 mg/dL  6-29 days.................<15.0 mg/dL         BUN 11       18       Calcium 8.3       8.5       Chloride 99       95       CO2 30       23       Color, UA    Yellow           Creatinine 0.7       0.9       Differential Method       Automated       eGFR >60.0       >60.0       Eos #       0.3       Eosinophil %       1.8       Estimated Avg Glucose       94       Glucose 107       137       Glucose, UA   Negative           Gran # (ANC)       11.3       Gran %       79.4       Group & Rh       A POS       Hematocrit       34.0       Hemoglobin       11.1       Hemoglobin A1C External       4.9  Comment: ADA Screening Guidelines:  5.7-6.4%  Consistent with prediabetes  >or=6.5%  Consistent with diabetes    High levels of fetal hemoglobin interfere with the HbA1C  assay. Heterozygous hemoglobin variants (HbS, HgC, etc)do  not significantly interfere with this assay.   However, presence of multiple variants may affect accuracy.         Hepatitis C Ab       Non-reactive       HIV 1/2 Ag/Ab       Non-reactive       Immature Grans (Abs)       0.14  Comment: Mild elevation in immature granulocytes is non specific and   can be seen in a variety of conditions including stress response,   acute inflammation, trauma and pregnancy. Correlation with other   laboratory and clinical findings is essential.         Immature Granulocytes       1.0       INDIRECT KYARA       NEG       INR       1.0  Comment: Coumadin Therapy:  2.0 - 3.0 for INR for all indicators except mechanical heart valves  and antiphospholipid syndromes which should use 2.5 - 3.5.         Ketones, UA   Trace           Leukocytes, UA   Negative           Lymph #       1.2       Lymph %       8.4       Magnesium 1.8       1.4       MCH       30.5       MCHC       32.6       MCV       93       Mono #       1.3       Mono %       9.0       MPV       10.7       NITRITE UA   Negative           nRBC       0       Occult Blood UA   Negative           pH, UA   6.0           Phosphorus       2.2       Platelets       262       Potassium 2.9       2.4  Comment: *Critical value notification by _RS_ with confirmation of receipt to    Kevin Lombardo, RN. at  Date 05/26/2023 Time 22:27.         Prealbumin     31         PROTEIN TOTAL       6.9       Protein, UA   Negative  Comment: Recommend a 24 hour urine protein or a urine   protein/creatinine ratio if globulin induced proteinuria is  clinically suspected.             Protime       10.3       RBC       3.64       RDW       23.9       Sodium 139       133       Specific [place], UA   1.010           Specimen Outdate       05/29/2023 23:59       Specimen UA   Urine, Catheterized  Comment: PUREWICK           Transferrin       286       Troponin I       0.019  Comment: The reference interval for Troponin I represents the 99th percentile   cutoff   for our facility and is consistent with 3rd generation assay   performance.         Vit D, 25-Hydroxy     43  Comment: Vitamin D deficiency.........<10 ng/mL                              Vitamin D insufficiency......10-29 ng/mL       Vitamin D sufficiency........> or equal to 30 ng/mL  Vitamin D toxicity............>100 ng/mL           WBC       14.26               Significant Imaging: I have reviewed all pertinent imaging results/findings within the past 24 hours.

## 2023-05-28 ENCOUNTER — ANESTHESIA (OUTPATIENT)
Dept: SURGERY | Facility: HOSPITAL | Age: 73
DRG: 522 | End: 2023-05-28
Payer: MEDICARE

## 2023-05-28 PROBLEM — Z01.810 PREOP CARDIOVASCULAR EXAM: Status: RESOLVED | Noted: 2023-05-27 | Resolved: 2023-05-28

## 2023-05-28 LAB
ANION GAP SERPL CALC-SCNC: 11 MMOL/L (ref 8–16)
ANISOCYTOSIS BLD QL SMEAR: ABNORMAL
BASO STIPL BLD QL SMEAR: ABNORMAL
BASOPHILS # BLD AUTO: 0.06 K/UL (ref 0–0.2)
BASOPHILS # BLD AUTO: 0.08 K/UL (ref 0–0.2)
BASOPHILS NFR BLD: 0.4 % (ref 0–1.9)
BASOPHILS NFR BLD: 0.7 % (ref 0–1.9)
BUN SERPL-MCNC: 9 MG/DL (ref 8–23)
CALCIUM SERPL-MCNC: 8.9 MG/DL (ref 8.7–10.5)
CHLORIDE SERPL-SCNC: 100 MMOL/L (ref 95–110)
CO2 SERPL-SCNC: 27 MMOL/L (ref 23–29)
CREAT SERPL-MCNC: 0.8 MG/DL (ref 0.5–1.4)
DACRYOCYTES BLD QL SMEAR: ABNORMAL
DIFFERENTIAL METHOD: ABNORMAL
DIFFERENTIAL METHOD: ABNORMAL
EOSINOPHIL # BLD AUTO: 0 K/UL (ref 0–0.5)
EOSINOPHIL # BLD AUTO: 0.7 K/UL (ref 0–0.5)
EOSINOPHIL NFR BLD: 0.1 % (ref 0–8)
EOSINOPHIL NFR BLD: 6 % (ref 0–8)
ERYTHROCYTE [DISTWIDTH] IN BLOOD BY AUTOMATED COUNT: 23.9 % (ref 11.5–14.5)
ERYTHROCYTE [DISTWIDTH] IN BLOOD BY AUTOMATED COUNT: 24.3 % (ref 11.5–14.5)
EST. GFR  (NO RACE VARIABLE): >60 ML/MIN/1.73 M^2
GLUCOSE SERPL-MCNC: 100 MG/DL (ref 70–110)
HCT VFR BLD AUTO: 32.1 % (ref 37–48.5)
HCT VFR BLD AUTO: 37 % (ref 37–48.5)
HGB BLD-MCNC: 10.2 G/DL (ref 12–16)
HGB BLD-MCNC: 11.3 G/DL (ref 12–16)
IMM GRANULOCYTES # BLD AUTO: 0.08 K/UL (ref 0–0.04)
IMM GRANULOCYTES # BLD AUTO: 0.22 K/UL (ref 0–0.04)
IMM GRANULOCYTES NFR BLD AUTO: 0.7 % (ref 0–0.5)
IMM GRANULOCYTES NFR BLD AUTO: 1.6 % (ref 0–0.5)
LYMPHOCYTES # BLD AUTO: 0.8 K/UL (ref 1–4.8)
LYMPHOCYTES # BLD AUTO: 1.9 K/UL (ref 1–4.8)
LYMPHOCYTES NFR BLD: 16.5 % (ref 18–48)
LYMPHOCYTES NFR BLD: 5.8 % (ref 18–48)
MAGNESIUM SERPL-MCNC: 1.7 MG/DL (ref 1.6–2.6)
MCH RBC QN AUTO: 29.7 PG (ref 27–31)
MCH RBC QN AUTO: 30.7 PG (ref 27–31)
MCHC RBC AUTO-ENTMCNC: 30.5 G/DL (ref 32–36)
MCHC RBC AUTO-ENTMCNC: 31.8 G/DL (ref 32–36)
MCV RBC AUTO: 97 FL (ref 82–98)
MCV RBC AUTO: 97 FL (ref 82–98)
MONOCYTES # BLD AUTO: 0.9 K/UL (ref 0.3–1)
MONOCYTES # BLD AUTO: 2.3 K/UL (ref 0.3–1)
MONOCYTES NFR BLD: 19.6 % (ref 4–15)
MONOCYTES NFR BLD: 6.6 % (ref 4–15)
NEUTROPHILS # BLD AUTO: 12 K/UL (ref 1.8–7.7)
NEUTROPHILS # BLD AUTO: 6.5 K/UL (ref 1.8–7.7)
NEUTROPHILS NFR BLD: 56.5 % (ref 38–73)
NEUTROPHILS NFR BLD: 85.5 % (ref 38–73)
NRBC BLD-RTO: 0 /100 WBC
NRBC BLD-RTO: 0 /100 WBC
OVALOCYTES BLD QL SMEAR: ABNORMAL
PHOSPHATE SERPL-MCNC: 2.4 MG/DL (ref 2.7–4.5)
PLATELET # BLD AUTO: 228 K/UL (ref 150–450)
PLATELET # BLD AUTO: 254 K/UL (ref 150–450)
PMV BLD AUTO: 10.5 FL (ref 9.2–12.9)
PMV BLD AUTO: 10.8 FL (ref 9.2–12.9)
POIKILOCYTOSIS BLD QL SMEAR: SLIGHT
POLYCHROMASIA BLD QL SMEAR: ABNORMAL
POTASSIUM SERPL-SCNC: 3.8 MMOL/L (ref 3.5–5.1)
RBC # BLD AUTO: 3.32 M/UL (ref 4–5.4)
RBC # BLD AUTO: 3.81 M/UL (ref 4–5.4)
SODIUM SERPL-SCNC: 138 MMOL/L (ref 136–145)
SPHEROCYTES BLD QL SMEAR: ABNORMAL
WBC # BLD AUTO: 11.53 K/UL (ref 3.9–12.7)
WBC # BLD AUTO: 14 K/UL (ref 3.9–12.7)

## 2023-05-28 PROCEDURE — 64450 NJX AA&/STRD OTHER PN/BRANCH: CPT | Mod: 59,LT,, | Performed by: ANESTHESIOLOGY

## 2023-05-28 PROCEDURE — 99900035 HC TECH TIME PER 15 MIN (STAT)

## 2023-05-28 PROCEDURE — 27130 PR TOTAL HIP ARTHROPLASTY: ICD-10-PCS | Mod: LT,,, | Performed by: ORTHOPAEDIC SURGERY

## 2023-05-28 PROCEDURE — 88305 TISSUE EXAM BY PATHOLOGIST: ICD-10-PCS | Mod: 26,,, | Performed by: PATHOLOGY

## 2023-05-28 PROCEDURE — 64450 LEFT PENG SINGLE INJECTION: ICD-10-PCS | Mod: 59,LT,, | Performed by: ANESTHESIOLOGY

## 2023-05-28 PROCEDURE — 88342 IMHCHEM/IMCYTCHM 1ST ANTB: CPT | Mod: 26,,, | Performed by: PATHOLOGY

## 2023-05-28 PROCEDURE — 25000003 PHARM REV CODE 250: Performed by: STUDENT IN AN ORGANIZED HEALTH CARE EDUCATION/TRAINING PROGRAM

## 2023-05-28 PROCEDURE — 94761 N-INVAS EAR/PLS OXIMETRY MLT: CPT

## 2023-05-28 PROCEDURE — 63600175 PHARM REV CODE 636 W HCPCS: Performed by: ORTHOPAEDIC SURGERY

## 2023-05-28 PROCEDURE — C1776 JOINT DEVICE (IMPLANTABLE): HCPCS | Performed by: ORTHOPAEDIC SURGERY

## 2023-05-28 PROCEDURE — 99223 1ST HOSP IP/OBS HIGH 75: CPT | Mod: ,,, | Performed by: ORTHOPAEDIC SURGERY

## 2023-05-28 PROCEDURE — 63600175 PHARM REV CODE 636 W HCPCS: Performed by: ANESTHESIOLOGY

## 2023-05-28 PROCEDURE — 25000003 PHARM REV CODE 250: Performed by: ANESTHESIOLOGY

## 2023-05-28 PROCEDURE — 27000221 HC OXYGEN, UP TO 24 HOURS

## 2023-05-28 PROCEDURE — D9220A PRA ANESTHESIA: ICD-10-PCS | Mod: CRNA,,, | Performed by: NURSE ANESTHETIST, CERTIFIED REGISTERED

## 2023-05-28 PROCEDURE — 25000003 PHARM REV CODE 250: Performed by: NURSE ANESTHETIST, CERTIFIED REGISTERED

## 2023-05-28 PROCEDURE — 88311 PR  DECALCIFY TISSUE: ICD-10-PCS | Mod: 26,,, | Performed by: PATHOLOGY

## 2023-05-28 PROCEDURE — 63600175 PHARM REV CODE 636 W HCPCS: Performed by: HOSPITALIST

## 2023-05-28 PROCEDURE — C1713 ANCHOR/SCREW BN/BN,TIS/BN: HCPCS | Performed by: ORTHOPAEDIC SURGERY

## 2023-05-28 PROCEDURE — 83735 ASSAY OF MAGNESIUM: CPT | Performed by: HOSPITALIST

## 2023-05-28 PROCEDURE — 37000009 HC ANESTHESIA EA ADD 15 MINS: Performed by: ORTHOPAEDIC SURGERY

## 2023-05-28 PROCEDURE — 80048 BASIC METABOLIC PNL TOTAL CA: CPT | Performed by: HOSPITALIST

## 2023-05-28 PROCEDURE — 88342 IMHCHEM/IMCYTCHM 1ST ANTB: CPT | Mod: HCNC | Performed by: PATHOLOGY

## 2023-05-28 PROCEDURE — D9220A PRA ANESTHESIA: ICD-10-PCS | Mod: ANES,,, | Performed by: ANESTHESIOLOGY

## 2023-05-28 PROCEDURE — D9220A PRA ANESTHESIA: Mod: CRNA,,, | Performed by: NURSE ANESTHETIST, CERTIFIED REGISTERED

## 2023-05-28 PROCEDURE — 64450 NJX AA&/STRD OTHER PN/BRANCH: CPT | Performed by: ANESTHESIOLOGY

## 2023-05-28 PROCEDURE — 88342 CHG IMMUNOCYTOCHEMISTRY: ICD-10-PCS | Mod: 26,,, | Performed by: PATHOLOGY

## 2023-05-28 PROCEDURE — 88305 TISSUE EXAM BY PATHOLOGIST: CPT | Performed by: PATHOLOGY

## 2023-05-28 PROCEDURE — 99232 PR SUBSEQUENT HOSPITAL CARE,LEVL II: ICD-10-PCS | Mod: ,,, | Performed by: HOSPITALIST

## 2023-05-28 PROCEDURE — 88311 DECALCIFY TISSUE: CPT | Mod: 26,,, | Performed by: PATHOLOGY

## 2023-05-28 PROCEDURE — C1889 IMPLANT/INSERT DEVICE, NOC: HCPCS | Performed by: ORTHOPAEDIC SURGERY

## 2023-05-28 PROCEDURE — 25000003 PHARM REV CODE 250: Performed by: HOSPITALIST

## 2023-05-28 PROCEDURE — 71000033 HC RECOVERY, INTIAL HOUR: Performed by: ORTHOPAEDIC SURGERY

## 2023-05-28 PROCEDURE — 36000710: Performed by: ORTHOPAEDIC SURGERY

## 2023-05-28 PROCEDURE — 63600175 PHARM REV CODE 636 W HCPCS: Performed by: NURSE ANESTHETIST, CERTIFIED REGISTERED

## 2023-05-28 PROCEDURE — 84100 ASSAY OF PHOSPHORUS: CPT | Performed by: HOSPITALIST

## 2023-05-28 PROCEDURE — D9220A PRA ANESTHESIA: Mod: ANES,,, | Performed by: ANESTHESIOLOGY

## 2023-05-28 PROCEDURE — 85025 COMPLETE CBC W/AUTO DIFF WBC: CPT | Mod: 91 | Performed by: HOSPITALIST

## 2023-05-28 PROCEDURE — 99223 PR INITIAL HOSPITAL CARE,LEVL III: ICD-10-PCS | Mod: ,,, | Performed by: ORTHOPAEDIC SURGERY

## 2023-05-28 PROCEDURE — 63600175 PHARM REV CODE 636 W HCPCS: Performed by: STUDENT IN AN ORGANIZED HEALTH CARE EDUCATION/TRAINING PROGRAM

## 2023-05-28 PROCEDURE — 76942 LEFT PENG SINGLE INJECTION: ICD-10-PCS | Mod: 26,,, | Performed by: ANESTHESIOLOGY

## 2023-05-28 PROCEDURE — 71000015 HC POSTOP RECOV 1ST HR: Performed by: ORTHOPAEDIC SURGERY

## 2023-05-28 PROCEDURE — 11000001 HC ACUTE MED/SURG PRIVATE ROOM

## 2023-05-28 PROCEDURE — 36000711: Performed by: ORTHOPAEDIC SURGERY

## 2023-05-28 PROCEDURE — 76942 ECHO GUIDE FOR BIOPSY: CPT | Mod: 26,,, | Performed by: ANESTHESIOLOGY

## 2023-05-28 PROCEDURE — 36415 COLL VENOUS BLD VENIPUNCTURE: CPT | Performed by: HOSPITALIST

## 2023-05-28 PROCEDURE — 88311 DECALCIFY TISSUE: CPT | Mod: HCNC | Performed by: PATHOLOGY

## 2023-05-28 PROCEDURE — 37000008 HC ANESTHESIA 1ST 15 MINUTES: Performed by: ORTHOPAEDIC SURGERY

## 2023-05-28 PROCEDURE — 99232 SBSQ HOSP IP/OBS MODERATE 35: CPT | Mod: ,,, | Performed by: HOSPITALIST

## 2023-05-28 PROCEDURE — 27130 TOTAL HIP ARTHROPLASTY: CPT | Mod: LT,,, | Performed by: ORTHOPAEDIC SURGERY

## 2023-05-28 PROCEDURE — 27201423 OPTIME MED/SURG SUP & DEVICES STERILE SUPPLY: Performed by: ORTHOPAEDIC SURGERY

## 2023-05-28 PROCEDURE — 88305 TISSUE EXAM BY PATHOLOGIST: CPT | Mod: 26,,, | Performed by: PATHOLOGY

## 2023-05-28 DEVICE — SHELL TRIDENT II ACET E 52MM: Type: IMPLANTABLE DEVICE | Site: HIP | Status: FUNCTIONAL

## 2023-05-28 DEVICE — LINER ACET SZ E 42MM COCR: Type: IMPLANTABLE DEVICE | Site: HIP | Status: FUNCTIONAL

## 2023-05-28 DEVICE — IMPLANTABLE DEVICE: Type: IMPLANTABLE DEVICE | Site: HIP | Status: FUNCTIONAL

## 2023-05-28 DEVICE — SPACER OSTEONIC UNIVERSAL 12MM: Type: IMPLANTABLE DEVICE | Site: HIP | Status: FUNCTIONAL

## 2023-05-28 DEVICE — INSERT AMD/MDM X3 48MM: Type: IMPLANTABLE DEVICE | Site: HIP | Status: FUNCTIONAL

## 2023-05-28 DEVICE — SCREW TRIDENT II LP HEX 6.5X25: Type: IMPLANTABLE DEVICE | Site: HIP | Status: FUNCTIONAL

## 2023-05-28 DEVICE — PLUG BONE: Type: IMPLANTABLE DEVICE | Site: HIP | Status: FUNCTIONAL

## 2023-05-28 RX ORDER — DEXAMETHASONE SODIUM PHOSPHATE 4 MG/ML
INJECTION, SOLUTION INTRA-ARTICULAR; INTRALESIONAL; INTRAMUSCULAR; INTRAVENOUS; SOFT TISSUE
Status: DISCONTINUED | OUTPATIENT
Start: 2023-05-28 | End: 2023-05-28

## 2023-05-28 RX ORDER — ROCURONIUM BROMIDE 10 MG/ML
INJECTION, SOLUTION INTRAVENOUS
Status: DISCONTINUED | OUTPATIENT
Start: 2023-05-28 | End: 2023-05-28

## 2023-05-28 RX ORDER — AMOXICILLIN 250 MG
1 CAPSULE ORAL 2 TIMES DAILY
Status: DISCONTINUED | OUTPATIENT
Start: 2023-05-28 | End: 2023-05-31 | Stop reason: HOSPADM

## 2023-05-28 RX ORDER — PHENYLEPHRINE HYDROCHLORIDE 10 MG/ML
INJECTION INTRAVENOUS
Status: DISCONTINUED | OUTPATIENT
Start: 2023-05-28 | End: 2023-05-28

## 2023-05-28 RX ORDER — SODIUM CHLORIDE 0.9 % (FLUSH) 0.9 %
10 SYRINGE (ML) INJECTION
Status: DISCONTINUED | OUTPATIENT
Start: 2023-05-28 | End: 2023-05-31 | Stop reason: HOSPADM

## 2023-05-28 RX ORDER — CEFAZOLIN SODIUM 1 G/3ML
INJECTION, POWDER, FOR SOLUTION INTRAMUSCULAR; INTRAVENOUS
Status: DISCONTINUED | OUTPATIENT
Start: 2023-05-28 | End: 2023-05-28

## 2023-05-28 RX ORDER — VANCOMYCIN HYDROCHLORIDE 1 G/20ML
INJECTION, POWDER, LYOPHILIZED, FOR SOLUTION INTRAVENOUS
Status: DISCONTINUED | OUTPATIENT
Start: 2023-05-28 | End: 2023-05-28 | Stop reason: HOSPADM

## 2023-05-28 RX ORDER — LANOLIN ALCOHOL/MO/W.PET/CERES
400 CREAM (GRAM) TOPICAL ONCE
Status: DISCONTINUED | OUTPATIENT
Start: 2023-05-28 | End: 2023-05-31 | Stop reason: HOSPADM

## 2023-05-28 RX ORDER — LIDOCAINE HYDROCHLORIDE 10 MG/ML
1 INJECTION, SOLUTION EPIDURAL; INFILTRATION; INTRACAUDAL; PERINEURAL
Status: DISCONTINUED | OUTPATIENT
Start: 2023-05-28 | End: 2023-05-31 | Stop reason: HOSPADM

## 2023-05-28 RX ORDER — CELECOXIB 200 MG/1
200 CAPSULE ORAL DAILY
Status: DISCONTINUED | OUTPATIENT
Start: 2023-05-28 | End: 2023-05-31 | Stop reason: HOSPADM

## 2023-05-28 RX ORDER — METHOCARBAMOL 500 MG/1
500 TABLET, FILM COATED ORAL 4 TIMES DAILY
Status: DISCONTINUED | OUTPATIENT
Start: 2023-05-28 | End: 2023-05-31 | Stop reason: HOSPADM

## 2023-05-28 RX ORDER — HYDROCODONE BITARTRATE AND ACETAMINOPHEN 500; 5 MG/1; MG/1
TABLET ORAL
Status: DISCONTINUED | OUTPATIENT
Start: 2023-05-28 | End: 2023-05-28

## 2023-05-28 RX ORDER — POLYETHYLENE GLYCOL 3350 17 G/17G
17 POWDER, FOR SOLUTION ORAL DAILY
Status: DISCONTINUED | OUTPATIENT
Start: 2023-05-28 | End: 2023-05-28

## 2023-05-28 RX ORDER — MIDAZOLAM HYDROCHLORIDE 1 MG/ML
INJECTION INTRAMUSCULAR; INTRAVENOUS
Status: DISCONTINUED | OUTPATIENT
Start: 2023-05-28 | End: 2023-05-28

## 2023-05-28 RX ORDER — ONDANSETRON 2 MG/ML
INJECTION INTRAMUSCULAR; INTRAVENOUS
Status: DISCONTINUED | OUTPATIENT
Start: 2023-05-28 | End: 2023-05-28

## 2023-05-28 RX ORDER — HYDROCODONE BITARTRATE AND ACETAMINOPHEN 500; 5 MG/1; MG/1
TABLET ORAL
Status: DISCONTINUED | OUTPATIENT
Start: 2023-05-28 | End: 2023-05-31 | Stop reason: HOSPADM

## 2023-05-28 RX ORDER — FENTANYL CITRATE 50 UG/ML
INJECTION, SOLUTION INTRAMUSCULAR; INTRAVENOUS
Status: DISCONTINUED | OUTPATIENT
Start: 2023-05-28 | End: 2023-05-28

## 2023-05-28 RX ORDER — ROPIVACAINE HYDROCHLORIDE 5 MG/ML
INJECTION, SOLUTION EPIDURAL; INFILTRATION; PERINEURAL
Status: COMPLETED | OUTPATIENT
Start: 2023-05-28 | End: 2023-05-28

## 2023-05-28 RX ORDER — PROPOFOL 10 MG/ML
VIAL (ML) INTRAVENOUS
Status: DISCONTINUED | OUTPATIENT
Start: 2023-05-28 | End: 2023-05-28

## 2023-05-28 RX ORDER — KETAMINE HCL IN 0.9 % NACL 50 MG/5 ML
SYRINGE (ML) INTRAVENOUS
Status: DISCONTINUED | OUTPATIENT
Start: 2023-05-28 | End: 2023-05-28

## 2023-05-28 RX ORDER — ACETAMINOPHEN 500 MG
1000 TABLET ORAL EVERY 6 HOURS
Status: DISPENSED | OUTPATIENT
Start: 2023-05-28 | End: 2023-05-30

## 2023-05-28 RX ORDER — ROPIVACAINE HYDROCHLORIDE 2 MG/ML
0.1 INJECTION, SOLUTION EPIDURAL; INFILTRATION; PERINEURAL CONTINUOUS
Status: DISCONTINUED | OUTPATIENT
Start: 2023-05-28 | End: 2023-05-31 | Stop reason: HOSPADM

## 2023-05-28 RX ORDER — MUPIROCIN 20 MG/G
1 OINTMENT TOPICAL
Status: DISCONTINUED | OUTPATIENT
Start: 2023-05-28 | End: 2023-05-31 | Stop reason: HOSPADM

## 2023-05-28 RX ORDER — POTASSIUM CHLORIDE 750 MG/1
10 TABLET, EXTENDED RELEASE ORAL DAILY
Qty: 7 TABLET | Refills: 0 | Status: SHIPPED | OUTPATIENT
Start: 2023-05-28 | End: 2023-05-30 | Stop reason: HOSPADM

## 2023-05-28 RX ORDER — LIDOCAINE HYDROCHLORIDE 20 MG/ML
INJECTION INTRAVENOUS
Status: DISCONTINUED | OUTPATIENT
Start: 2023-05-28 | End: 2023-05-28

## 2023-05-28 RX ORDER — SODIUM CHLORIDE 0.9 % (FLUSH) 0.9 %
3 SYRINGE (ML) INJECTION
Status: DISCONTINUED | OUTPATIENT
Start: 2023-05-28 | End: 2023-05-31 | Stop reason: HOSPADM

## 2023-05-28 RX ORDER — METHOCARBAMOL 500 MG/1
500 TABLET, FILM COATED ORAL EVERY 6 HOURS PRN
Status: DISCONTINUED | OUTPATIENT
Start: 2023-05-28 | End: 2023-05-28

## 2023-05-28 RX ORDER — FENTANYL CITRATE 50 UG/ML
25 INJECTION, SOLUTION INTRAMUSCULAR; INTRAVENOUS EVERY 5 MIN PRN
Status: DISCONTINUED | OUTPATIENT
Start: 2023-05-28 | End: 2023-05-31 | Stop reason: HOSPADM

## 2023-05-28 RX ORDER — MUPIROCIN 20 MG/G
OINTMENT TOPICAL
Status: DISCONTINUED | OUTPATIENT
Start: 2023-05-28 | End: 2023-05-31 | Stop reason: HOSPADM

## 2023-05-28 RX ORDER — ACETAMINOPHEN 500 MG
1000 TABLET ORAL EVERY 8 HOURS
Status: DISCONTINUED | OUTPATIENT
Start: 2023-05-28 | End: 2023-05-28

## 2023-05-28 RX ADMIN — DEXAMETHASONE SODIUM PHOSPHATE 4 MG: 4 INJECTION, SOLUTION INTRAMUSCULAR; INTRAVENOUS at 08:05

## 2023-05-28 RX ADMIN — LIDOCAINE HYDROCHLORIDE 50 MG: 20 INJECTION INTRAVENOUS at 08:05

## 2023-05-28 RX ADMIN — PHENYLEPHRINE HYDROCHLORIDE 200 MCG: 10 INJECTION INTRAVENOUS at 10:05

## 2023-05-28 RX ADMIN — ONDANSETRON 4 MG: 2 INJECTION INTRAMUSCULAR; INTRAVENOUS at 08:05

## 2023-05-28 RX ADMIN — METHOCARBAMOL 500 MG: 500 TABLET ORAL at 08:05

## 2023-05-28 RX ADMIN — ACETAMINOPHEN 1000 MG: 500 TABLET ORAL at 11:05

## 2023-05-28 RX ADMIN — ACETAMINOPHEN 1000 MG: 500 TABLET ORAL at 05:05

## 2023-05-28 RX ADMIN — PHENYLEPHRINE HYDROCHLORIDE 100 MCG: 10 INJECTION INTRAVENOUS at 09:05

## 2023-05-28 RX ADMIN — SENNOSIDES AND DOCUSATE SODIUM 1 TABLET: 50; 8.6 TABLET ORAL at 08:05

## 2023-05-28 RX ADMIN — FENTANYL CITRATE 25 MCG: 50 INJECTION, SOLUTION INTRAMUSCULAR; INTRAVENOUS at 09:05

## 2023-05-28 RX ADMIN — METHOCARBAMOL 500 MG: 500 TABLET ORAL at 05:05

## 2023-05-28 RX ADMIN — CEFAZOLIN 2 G: 2 INJECTION, POWDER, FOR SOLUTION INTRAMUSCULAR; INTRAVENOUS at 11:05

## 2023-05-28 RX ADMIN — PROPOFOL 80 MG: 10 INJECTION, EMULSION INTRAVENOUS at 08:05

## 2023-05-28 RX ADMIN — FENTANYL CITRATE 25 MCG: 50 INJECTION INTRAMUSCULAR; INTRAVENOUS at 12:05

## 2023-05-28 RX ADMIN — APIXABAN 2.5 MG: 2.5 TABLET, FILM COATED ORAL at 08:05

## 2023-05-28 RX ADMIN — MUPIROCIN: 20 OINTMENT TOPICAL at 07:05

## 2023-05-28 RX ADMIN — PREGABALIN 75 MG: 75 CAPSULE ORAL at 08:05

## 2023-05-28 RX ADMIN — MIDAZOLAM HYDROCHLORIDE 0.5 MG: 1 INJECTION INTRAMUSCULAR; INTRAVENOUS at 08:05

## 2023-05-28 RX ADMIN — ROCURONIUM BROMIDE 40 MG: 10 INJECTION, SOLUTION INTRAVENOUS at 08:05

## 2023-05-28 RX ADMIN — Medication 20 MG: at 09:05

## 2023-05-28 RX ADMIN — AMLODIPINE BESYLATE 10 MG: 5 TABLET ORAL at 02:05

## 2023-05-28 RX ADMIN — CEFAZOLIN 2 G: 2 INJECTION, POWDER, FOR SOLUTION INTRAMUSCULAR; INTRAVENOUS at 05:05

## 2023-05-28 RX ADMIN — CARVEDILOL 12.5 MG: 12.5 TABLET, FILM COATED ORAL at 05:05

## 2023-05-28 RX ADMIN — OXYCODONE HYDROCHLORIDE 5 MG: 5 TABLET ORAL at 02:05

## 2023-05-28 RX ADMIN — METHOCARBAMOL 500 MG: 500 TABLET ORAL at 12:05

## 2023-05-28 RX ADMIN — SENNOSIDES AND DOCUSATE SODIUM 1 TABLET: 50; 8.6 TABLET ORAL at 12:05

## 2023-05-28 RX ADMIN — ACETAMINOPHEN 1000 MG: 500 TABLET ORAL at 12:05

## 2023-05-28 RX ADMIN — FENTANYL CITRATE 25 MCG: 50 INJECTION, SOLUTION INTRAMUSCULAR; INTRAVENOUS at 08:05

## 2023-05-28 RX ADMIN — MONTELUKAST 10 MG: 10 TABLET, FILM COATED ORAL at 08:05

## 2023-05-28 RX ADMIN — TRANEXAMIC ACID 1000 MG: 100 INJECTION, SOLUTION INTRAVENOUS at 08:05

## 2023-05-28 RX ADMIN — CEFAZOLIN 2 G: 330 INJECTION, POWDER, FOR SOLUTION INTRAMUSCULAR; INTRAVENOUS at 08:05

## 2023-05-28 RX ADMIN — CARBOXYMETHYLCELLULOSE SODIUM 2 DROP: 2.5 SOLUTION/ DROPS OPHTHALMIC at 08:05

## 2023-05-28 RX ADMIN — ROPIVACAINE HYDROCHLORIDE 10 ML: 5 INJECTION EPIDURAL; INFILTRATION; PERINEURAL at 08:05

## 2023-05-28 RX ADMIN — CELECOXIB 200 MG: 200 CAPSULE ORAL at 02:05

## 2023-05-28 RX ADMIN — TRANEXAMIC ACID 1000 MG: 100 INJECTION, SOLUTION INTRAVENOUS at 10:05

## 2023-05-28 RX ADMIN — SODIUM CHLORIDE: 9 INJECTION, SOLUTION INTRAVENOUS at 07:05

## 2023-05-28 RX ADMIN — SUGAMMADEX 200 MG: 100 INJECTION, SOLUTION INTRAVENOUS at 11:05

## 2023-05-28 RX ADMIN — VANCOMYCIN HYDROCHLORIDE 1000 MG: 1 INJECTION, POWDER, LYOPHILIZED, FOR SOLUTION INTRAVENOUS at 07:05

## 2023-05-28 NOTE — OP NOTE
OPERATIVE NOTE     DATE OF PROCEDURE:  05/28/2023     PREOPERATIVE DIAGNOSIS:           Left femoral neck fracture, subcapital, closed, displaced, initial encounter  Fall from standing     POSTOPERATIVE DIAGNOSIS:         Left femoral neck fracture, subcapital, closed, displaced, initial encounter  Fall from standing     PROCEDURE:              Left total hip arthroplasty for femoral neck fracture     SURGEON:       Juan Wan MD     ASSISTANT:                 Teddy Garcia MD     ANESTHESIA:              General with regional block     EBL:                  250 mL     COMPLICATIONS:  None     IMPLANTS:       [place]  Trident 2 cup, 52 mm  6.5 mm screw x2 (25 mm, 25 mm)  MDM metal liner  [place] cemented stem, size 6, high offset  28 mm, +2.5 ceramic head, biolox delta  Poly insert  Simplex bone cement     5 FiberWire     Vancomycin 1g     SPECIMENS:    Femoral head sent for pathology     INDICATIONS FOR PROCEDURE:  72-year-old female  Fall 05/28/2023  Left hip pain,  Came to ED. Evaluated by ER, hospitalist team, orthopaedic resident on call tear. Diagnosed with a left displaced femoral neck fracture.  Admitted to the hospital for further evaluation treatment.     At the time my evaluation patient complained of isolated pain in left hip, 7/10, sharp, stabbing, no numbness no tingling.     Lives at home with , [place]  community ambulator, occasionally uses walker or cane  Performs all ADLs including gardening, shopping, cooking  History of prior cerebral aneurysm, no known deficits  Denies history of heart attack   Denies history of cancer, blood clot   Denies tobacco use, diabetes     Counseled patient and family members on diagnosis and treatment options.  Discussed non operative and operative management options.  Non operative management would consist of bed rest, traction, protected weight-bearing, likely result in malunion, nonunion, prolonged immobility, medical comorbidities  associated with these.  Discussed operative intervention in the form of total hip arthroplasty.  Hopefully this would improve immediate pain, function, mobility, decrease overall risk of medical comorbidities.  Also discussed hemiarthroplasty and open reduction internal fixation, but given patients functional activity level and age, I feel that IDA is in patients best interest to decrease risk of repeat operation and to maximize long term function.     The risks, benefits, and alternatives to surgery were discussed with the patient and/or family.    Specific risks discussed included, but were not limited to:  Leg length inequality, rotational malalignment, sciatic nerve palsy, dislocation/instability, chronic pain, stiffness, periprosthetic fracture, need for revision surgery, damage to nearby structures, including neurovascular structures leading to loss of function or loss of limb, bleeding, need for blood transfusion, pain, stiffness, scarring, numbness, tingling, weakness, compartment syndrome, malunion/nonunion, hardware failure, hardware prominence, infection, need for multiple staged procedures, prolonged antibiotics, iatrogenic fracture, heterotopic ossification, arthritis, a variety of medical complications including but not limited to heart attack, stroke, deep venous thrombosis, pulmonary embolism, prolonged hospitalization, prolonged intubation, and death.   Patient and/or family expressed an understanding and desires to proceed with surgery.   All questions were answered.  No guarantees were implied or stated.  Informed consent was obtained.     OPERATIVE PROCEDURE:  Patient met in the preoperative hold area and the correct site and side of surgery being the Left hip were marked and verified.  Preop regional block placed by our anesthesia colleagues.  Patient brought back to the operative suite.  General anesthesia smoothly induced.  Patient transferred over to operative table.  Placed in lateral  position on peg board.  Axillary roll placed.  All bony prominences were appropriately padded.  Patient received 2 g Ancef, 1 g vancomycin for preoperative antibiotics.  1 g IV TXA given to aid in hemostasis.  This was also re-dosed at the time of skin closure.  The left lower extremity was then prepped and draped in normal sterile fashion.     Time-out was performed verifying the correct patient, site/side of surgery, surgical consent, radiographs as applicable, preop antibiotics, necessary equipment, anticipated blood loss, length of procedure, postoperative disposition.        Plan for a posterior approach to hip.  Incision was made in line with the femur curving gently towards the posterior superior iliac spine at the level of the greater trochanter.  Hemostasis was achieved as needed with electrocautery. Subcutaneous tissue was divided.  Fascia was identified.  IT band was split.  Glute max fascia was split.  Muscle was divided. Bursa was resected as needed. Charnley retractor was placed.  Piriformis tendon was identified. It was sharply detached from its origin.  The remaining short external rotators and posterior capsule were also detached and tagged.  The femoral neck fracture was visualized.  Fracture hematoma was evacuated. Femoral neck was presented to our view.  Homans were placed to protect the underlying structures.  A femoral neck cut was made with a saw approximately 15 mm above the lesser trochanter, just distal to the extent of the fracture. Femoral head was then removed with a corkscrew. Utilizing rongeur, removed bony shards left in the acetabulum.     We then turned our attention to acetabulum exposure. Anterior retractor was placed over the anterior lip. Inferior capsular release was performed as needed. An inferior Cobra retractor was placed. . A posterior karthik retractor was placed. This provided complete view of her acetabulum. Labrum was completely excised. Ligamentum was completely  excised demonstrating the bony floor of the acetabulum. We then began reaming. Medialized until good bleeding bone was noted indicating we reached the floor of the acetabulum. We then sequentially reamed up to the appropriate size Reamer in line to line fashion at appropriate version/abduction.  We then placed a trial in the appropriate abduction and anteversion.  Good fit was noted.The appropriate size real acetabular component was then securely malleted into place in the appropriate version of approximately 40° abduction and 20° anteversion utilized anatomic landmarks.  It was good secure fit. We placed 2 screws through the cup in a safe location for added fixation. The acetabular shell was irrigated. The MDM liner was securely malleted into place. Acetabular retractors were removed.     We then turned our attention to the femur. Femoral neck elevator was utilized.   was utilized. Opening Reamer was utilized.  Lateralizing Reamer was utilized.  We then reamed and broached up to the appropriate size stem at approximately 15° of anteversion.  There was good secure fit and rotational control.  Calcar planer was utilized.  We then placed a trial neck and head and were satisfied with the leg length, abductor tension, hip stability.  The hip was stable at full extension with no undue shuck, hip was stable at full flexion with no impingement, the hip was stable 90° of flexion/30° adduction/30° internal rotation, 90° flexion/60° internal rotation.      The hip was dislocated.  Trial components were removed. Broach was removed. Cement restrictor was placed.  Canal was irrigated with pulse lavage saline. Cement was mixed on the back table. It was pressurized into the canal.  Final stem was placed in appropriate anteversion, approximately 15°.  Cement was allowed to harden.  Excess cement was removed. Acetabulum was irrigated.      Repeat head trial was placed. Once we were satisfied with appropriate limb length  and stability, final femoral head was chosen. Femoral neck was irrigated and dried. Femoral head was impacted on the Sarah taper obtaining good secure fit. Once again acetabulum was checked and any debris were removed.  Acetabulum was irrigated. Hip was reduced. There was appropriate leg length, abductor tension and stability as per the above parameters.     Wounds thoroughly irrigated with saline. Hemostasis was achieved as needed with electrocautery. 1 g vancomycin powder was placed deep into the wound. Posterior capsule and short external rotators were repaired through drill holes in the greater trochanter with 5 FiberWire. Fascia closed with 1 Vicryl.  Deep tissue closed with 0 Vicryl.  Subcutaneous tissue closed with 2 O Vicryl.  Skin closed with 3 Monocryl.  Dermabond/prineo, Aquacel dressing applied.     At the conclusion of procedure the patient had soft and compressible compartments, brisk cap refill, palpable DP/PT pulse in the operative extremity.  Leg lengths were equal when supine     Prior to final closure all counts were confirmed to be correct.  Patient tolerated the procedure well without any complications, was awoken from anesthesia, transferred PACU for further recovery.     POSTOPERATIVE PLAN:  72F, fall 5.27.23, Left femoral neck fracture     5.28.23 - left total hip arthroplasty for femoral neck fracture     Antibiotics x 24 hr (IV), followed by 1 wk of PO abx  Eliquis x 4 wks for DVT prophylaxis    Weight-bearing as tolerated Left lower extremity  Posterior hip precautions Left lower extremity x6 weeks     Multimodal pain control  Hospitalist comanagement  Physical therapy  Calcium, vitamin-D, boost     Fragility fracture Clinic referral     X-rays at subsequent followups:  Left hip     Follow-up postop 2 weeks, 6 weeks, 3 months, 6 months, 1 year     =====================  Juan Wan MD  Orthopaedic Surgery            Implant Cost         Name Type # Used Cost Per Implant     SHELL TRIDENT II ACET E 52MM  1 1163.02    SCREW TRIDENT II LP HEX 6.5X25  1 23.27    SCREW TRIDENT II LP HEX 6.5X25  1 23.27    LINER ACET SZ E 42MM COCR  1 883.89    SPACER OSTEONIC UNIVERSAL 12MM  1 89.32    PLUG BONE  1 89.32    STEM OMNIFIT 127DEG 30MM SZ 6  1 1349.1    INSERT AMD/MDM X3 48MM  1 883.89    HEAD TRITANIUM CTAPR 28MM +2.5  1 883.89

## 2023-05-28 NOTE — ASSESSMENT & PLAN NOTE
On IV iron infusions at home with recent severe nose bleed earlier this year as contributor to chronic losses  -hg 11 on admit

## 2023-05-28 NOTE — TRANSFER OF CARE
Anesthesia Transfer of Care Note    Patient: Jessica Floyd    Procedure(s) Performed: Procedure(s) (LRB):  TOTAL HIP ARTHROPLASTY (Left)    Patient location: PACU    Anesthesia Type: general    Transport from OR: Transported from OR on 6-10 L/min O2 by face mask with adequate spontaneous ventilation    Post pain: adequate analgesia    Post assessment: no apparent anesthetic complications and tolerated procedure well    Post vital signs: stable    Level of consciousness: responds to stimulation and sedated    Nausea/Vomiting: no nausea/vomiting    Complications: none    Transfer of care protocol was followed      Last vitals:   Visit Vitals  BP (!) 145/64   Pulse 77   Temp 36.8 °C (98.2 °F) (Oral)   Resp 19   Wt 55.8 kg (123 lb)   SpO2 97%   Breastfeeding No   BMI 21.79 kg/m²

## 2023-05-28 NOTE — NURSING TRANSFER
Nursing Transfer Note      5/28/2023     Reason patient is being transferred: post op    Transfer To: 528    Transfer via bed    Transfer with 02 2 liters NC     Transported by Pt transport x 2     Telemetry: no telemetry ordered     Medicines sent: None    Any special needs or follow-up needed: None    Chart send with patient: Yes    Notified: spouse    Patient reassessed at: 5/28/2023 1230  1  Upon arrival to floor: patient oriented to room, call bell in reach, and bed in lowest position

## 2023-05-28 NOTE — PROGRESS NOTES
[facility] - Surgery  Orthopedics  Progress Note    Patient Name: Jessica Floyd  MRN: [id number]  Admission Date: 5/26/2023  Hospital Length of Stay: 2 days  Attending Provider: Angela Nolan MD  Primary Care Provider: Effie Olmedo MD  Follow-up For: Procedure(s) (LRB):  ARTHROPLASTY, HIP: Left, lateral on Peg Board, Thong (Left)    Post-Operative Day: Day of Surgery  Subjective:     Principal Problem:Left displaced femoral neck fracture    Principal Orthopedic Problem: Same    Interval History: NAEON, patient stable, pain controlled. No acute complaints this morning. NPO since midnight,  abreu in place. Plan for OR today.    Review of patient's allergies indicates:  No Known Allergies    Current Facility-Administered Medications   Medication    albuterol-ipratropium 2.5 mg-0.5 mg/3 mL nebulizer solution 3 mL    allopurinoL tablet 200 mg    amLODIPine tablet 10 mg    atorvastatin tablet 80 mg    bisacodyL suppository 10 mg    calcium-vitamin D3 500 mg-5 mcg (200 unit) per tablet 2 tablet    carvediloL tablet 12.5 mg    dextrose 5 % and 0.45 % NaCl with KCl 20 mEq infusion    fluticasone propionate 50 mcg/actuation nasal spray 100 mcg    heparin (porcine) injection 5,000 Units    melatonin tablet 6 mg    methocarbamoL tablet 500 mg    montelukast tablet 10 mg    morphine injection 2 mg    ondansetron injection 4 mg    oxyCODONE immediate release tablet 5 mg    pantoprazole EC tablet 40 mg    polyethylene glycol packet 17 g    pregabalin capsule 75 mg    sodium chloride 0.9% flush 10 mL    vitamin D 1000 units tablet 2,000 Units     Facility-Administered Medications Ordered in Other Encounters   Medication    sodium hyaluronate (EUFLEXXA) 10 mg/mL(mw 2.4 -3.6 million) injection 20 mg     Objective:     Vital Signs (Most Recent):  Temp: 98.2 °F (36.8 °C) (05/28/23 0328)  Pulse: 68 (05/28/23 0328)  Resp: 18 (05/28/23 0328)  BP: 136/82 (05/28/23 0328)  SpO2: 96 % (05/28/23 0328) Vital Signs (24h Range):  Temp:  [97.2  °F (36.2 °C)-98.2 °F (36.8 °C)] 98.2 °F (36.8 °C)  Pulse:  [68-78] 68  Resp:  [16-18] 18  SpO2:  [84 %-96 %] 96 %  BP: (112-149)/(61-82) 136/82     Weight: 55.8 kg (123 lb)     Body mass index is 21.79 kg/m².      Intake/Output Summary (Last 24 hours) at 5/28/2023 0616  Last data filed at 5/28/2023 0000  Gross per 24 hour   Intake 360 ml   Output 900 ml   Net -540 ml       Physical Exam  LLE:  TTP around the left hip   Motor intact hip flex, quad, Tib Ant, gastroc, EHL, FHL.  Sensation intact saphenous, sural, deep/superficial peroneal, tibial nerves  Palp DP/PT pulse, BCR      Significant Labs: A1C:   Recent Labs   Lab 05/26/23 2155   HGBA1C 4.9     CBC:   Recent Labs   Lab 05/26/23 2155 05/28/23  0435   WBC 14.26* 11.53   HGB 11.1* 11.3*   HCT 34.0* 37.0    254     CMP:   Recent Labs   Lab 05/26/23 2155 05/27/23  0803 05/28/23  0435   * 139 138   K 2.4* 2.9* 3.8   CL 95 99 100   CO2 23 30* 27   * 107 100   BUN 18 11 9   CREATININE 0.9 0.7 0.8   CALCIUM 8.5* 8.3* 8.9   PROT 6.9  --   --    ALBUMIN 3.9  --   --    BILITOT 0.7  --   --    ALKPHOS 53*  --   --    AST 44*  --   --    ALT 34  --   --    ANIONGAP 15 10 11     Coagulation:   Recent Labs   Lab 05/26/23 2155   LABPROT 10.3   INR 1.0     Prealbumin:   Recent Labs   Lab 05/26/23  2314   PREALBUMIN 31     Urine Studies:   Recent Labs   Lab 05/27/23  0445   COLORU Yellow   APPEARANCEUA Clear   PHUR 6.0   SPECGRAV 1.010   PROTEINUA Negative   GLUCUA Negative   KETONESU Trace*   BILIRUBINUA Negative   OCCULTUA Negative   NITRITE Negative   LEUKOCYTESUR Negative     All pertinent labs within the past 24 hours have been reviewed.      Significant Imaging: I have reviewed and interpreted all pertinent imaging results/findings.   Assessment/Plan:     * Left displaced femoral neck fracture  Jessica Floyd is a 72 y.o. female with left FNF fracture, closed, NVI. They take no anticoagulation at home. They required walker for ambulatory assistive  devices with long distacnes but ambulate independently for short/moderate distances mostly due to arthritis pain in the hips and knees. She estimates that she can walk at least 3 blocks without the walker    -- Plan for OR today   -- NPO   -- UA clean, Hb 11  -- Pain controlled   -- Maldonado in place    Rich Shetty MD  Orthopedics  [facility] - Surgery

## 2023-05-28 NOTE — ASSESSMENT & PLAN NOTE
Replaced in ER and thought to be hctz contributor on admit so held, appetite spotty at times also per family, replaced 5/27 PM with improvements 5/28

## 2023-05-28 NOTE — ASSESSMENT & PLAN NOTE
-s/p ground level mechanical fall at home on 5/26 with immediate severe Left hip pain   -imaging showed acute mildly displaced left femoral neck fracture   -seen by orthopedic with plan for OR on 5/28 for IDA with WBAT with posterior hip precautions x 6 weeks post op.  -eliquis post op for 35 days until July 3rd  -oxycodone and multimodals post op for pain control   -likely will need SNF for rehabilitation and will send referrals monday  -SCDs for preop DVT PPX   -postop incentive spirometer use   Ortho f/u in 2 weeks for bandage removal  -bowel regimen  -vit D 43 at goal

## 2023-05-28 NOTE — ANESTHESIA PROCEDURE NOTES
Left PENG single injection    Patient location during procedure: pre-op   Block not for primary anesthetic.  Reason for block: at surgeon's request and post-op pain management   Post-op Pain Location: Left hip pain   Start time: 5/28/2023 8:08 AM  Timeout: 5/28/2023 8:07 AM   End time: 5/28/2023 8:10 AM    Staffing  Authorizing Provider: Danis Saab MD  Performing Provider: Rao Redmond MD    Staffing  Other anesthesia staff: Brody Hu Jr., MD  Preanesthetic Checklist  Completed: patient identified, IV checked, site marked, risks and benefits discussed, surgical consent, monitors and equipment checked, pre-op evaluation and timeout performed  Peripheral Block  Patient position: supine  Prep: ChloraPrep  Patient monitoring: heart rate, continuous pulse ox and cardiac monitor  Block type: PENG  Laterality: left  Injection technique: single shot  Needle  Needle type: Stimuplex   Needle gauge: 21 G  Needle length: 4 in  Needle localization: ultrasound guidance   -ultrasound image captured on disc.  Assessment  Injection assessment: negative aspiration and negative parasthesia  Paresthesia pain: none  Heart rate change: no  Slow fractionated injection: yes  Pain Tolerance: comfortable throughout block and no complaints  Medications:    Medications: ropivacaine (NAROPIN) injection 0.5% - Perineural   10 mL - 5/28/2023 8:10:00 AM    Additional Notes  20 mL ropivacaine 0.25% w/ epi 1:200k, decadron 1 mg, clonidine 50 mcg

## 2023-05-28 NOTE — PROGRESS NOTES
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 Pt states that she was walking to bathroom and tripped over a folding table and fell to hardwood floor around 6 pm this evening. Patient landed on left hip with immediate severe left hip and leg pain. The fall witnessed by her  who called EMS as patient was unable to get up or bear any weight on left side. Patient denies hitting her head, LOC, bleeding on site, chest pain, palpitation, dizziness, sob, focal weakness or numbness associated with fall.  Pt given 55 mcg fentanyl IM by EMS prior to arrival. Imaging showed acute mildly displaced left femoral neck fracture. Patient seen by orthopedic with plan for surgical fixation.      ED course: afebrile and vitals stable. Imaging showed acute mildly displaced left femoral neck fracture. EKG NSR with Q waves in septal leads VI-VII, TWI V4-6 (old). Labs significant for WBC 14, K 2.4, Mg 1.4 and phos 2.2. Patient received morphine, zofran, toradol, 40 meq oral KCL, magnesium sulfate and 1L NS bolus in ED. During my interview patient is awake and her  is present at bedside. She reports severe muscle spasm and left hip pain with minimal movement. Patient states at baseline she cane or walker sometime to avoid falls. She goes to physical therapy for degenerative R shoulder rotator cuff tear with improving strength and range of motion. She  walks around the house with her 5 year old granddaughter without chest pain or dyspnea. She denies fever, chills, cough, N/V/D/abdominal pain, dysuria or hematuria. She denies taking blood thinner. She does not smoke, but drinks couple of glasses of wine with dinner.       Overview/Hospital Course:  No notes on file    Interval history-seen in PACU and patient mildly confused post op as pulled off her gown and blankest and said she wants to get out of her bed and I redirected her and told her that she is still in post op and that surgery is done and went well from op note and that she cant get up until tomorrow with PT/OT and she said oh yeah and pulled the blankets back on. Plan for wbat with posterior hip precautions for 6 weeks with eliquis post op for dvt ppx. K and Mag improving with replacement. She said she ate meals yesterady with family visiting. BP stable at 138 systolic in pacu now. She denies pain in her hip currently. Will watch with close nursing monitoring will still mildly confused from anesthesia for safety until more oriented. Regular diet ordered for post op.        Review of patient's allergies indicates:  No Known Allergies    Current Facility-Administered Medications on File Prior to Encounter   Medication    sodium hyaluronate (EUFLEXXA) 10 mg/mL(mw 2.4 -3.6 million) injection 20 mg     Current Outpatient Medications on File Prior to Encounter   Medication Sig    carvediloL (COREG) 12.5 MG tablet Take 1 tablet (12.5 mg total) by mouth 2 (two) times daily with meals.    allopurinoL (ZYLOPRIM) 100 MG tablet Take 2 tablets (200 mg total) by mouth once daily.    amLODIPine (NORVASC) 10 MG tablet Take 1 tablet (10 mg total) by mouth once daily.    atorvastatin (LIPITOR) 80 MG tablet TAKE 1 TABLET BY MOUTH EVERY DAY    colchicine (MITIGARE) 0.6 mg Cap Take 1 capsule (0.6 mg total) by mouth once daily.    fluticasone propionate (FLONASE) 50 mcg/actuation nasal spray SPRAY ONCE INTO EACH  NOSTRIL ONCE DAILY    hydroCHLOROthiazide (HYDRODIURIL) 25 MG tablet TAKE 1 TABLET BY MOUTH EVERY DAY    hydrocortisone 2.5 % ointment as needed.    ibuprofen (ADVIL,MOTRIN) 600 MG tablet Take 1 tablet (600 mg total) by mouth every 6 (six) hours as needed for Pain.    ketoconazole (NIZORAL) 2 % cream APPLY BETWEEN TOES TWICE DAILY X 2 WKS    LIDOcaine (LIDODERM) 5 % Place 1 patch onto the skin once daily. Remove & Discard patch within 12 hours or as directed by MD Aram buenrostro (SINGULAIR) 10 mg tablet Take 1 tablet (10 mg total) by mouth every evening.    pantoprazole (PROTONIX) 40 MG tablet TAKE 1 TABLET BY MOUTH EVERY DAY     Family History       Problem Relation (Age of Onset)    Alcohol abuse Father    Aneurysm Mother    Gout Brother    Heart disease Brother    Hypertension Mother, Father    Kidney disease Brother    No Known Problems Daughter, Daughter, Daughter          Tobacco Use    Smoking status: Former     Packs/day: 0.50     Years: 20.00     Pack years: 10.00     Types: Cigarettes     Quit date: 1999     Years since quittin.4     Passive exposure: Past    Smokeless tobacco: Never   Substance and Sexual Activity    Alcohol use: Yes     Alcohol/week: 7.0 standard drinks     Types: 7 Glasses of wine per week    Drug use: No    Sexual activity: Yes     Partners: Male     Review of Systems   Constitutional:  Negative for activity change, appetite change, chills, diaphoresis, fatigue and fever.   HENT:  Negative for congestion, dental problem, drooling, ear discharge, ear pain, facial swelling, hearing loss, mouth sores, nosebleeds, postnasal drip, rhinorrhea, sinus pressure, sneezing, sore throat, tinnitus, trouble swallowing and voice change.    Eyes:  Negative for photophobia, pain, discharge, redness, itching and visual disturbance.   Respiratory:  Negative for apnea, cough, choking, chest tightness, shortness of breath, wheezing and stridor.    Cardiovascular:  Negative for chest  pain, palpitations and leg swelling.   Gastrointestinal:  Negative for abdominal distention, abdominal pain, anal bleeding, blood in stool, constipation, diarrhea, nausea, rectal pain and vomiting.   Endocrine: Negative for cold intolerance, heat intolerance, polydipsia, polyphagia and polyuria.   Genitourinary:  Negative for decreased urine volume, difficulty urinating, dyspareunia, dysuria, enuresis, flank pain, frequency, genital sores, hematuria, menstrual problem, pelvic pain, urgency, vaginal bleeding, vaginal discharge and vaginal pain.   Musculoskeletal:  Positive for arthralgias (chronic R shoulder pain and acute left hip pain), gait problem (baseline line unsteady gait) and myalgias (Left thigh and leg). Negative for back pain, joint swelling, neck pain and neck stiffness.   Skin:  Negative for color change, pallor, rash and wound.   Allergic/Immunologic: Negative for environmental allergies, food allergies and immunocompromised state.   Neurological:  Negative for dizziness, tremors, seizures, syncope, facial asymmetry, speech difficulty, weakness, light-headedness, numbness and headaches.   Hematological:  Negative for adenopathy. Does not bruise/bleed easily.   Psychiatric/Behavioral:  Negative for agitation, behavioral problems, confusion, decreased concentration, dysphoric mood, hallucinations, self-injury, sleep disturbance and suicidal ideas. The patient is not nervous/anxious and is not hyperactive.    Objective:     Vital Signs (Most Recent):  Temp: 98 °F (36.7 °C) (05/28/23 1145)  Pulse: 80 (05/28/23 1145)  Resp: 17 (05/28/23 1216)  BP: 138/80 (05/28/23 1145)  SpO2: 99 % (05/28/23 1145) Vital Signs (24h Range):  Temp:  [97.2 °F (36.2 °C)-98.2 °F (36.8 °C)] 98 °F (36.7 °C)  Pulse:  [68-80] 80  Resp:  [16-20] 17  SpO2:  [84 %-99 %] 99 %  BP: (113-149)/(61-82) 138/80     Weight: 55.8 kg (123 lb)  Body mass index is 21.79 kg/m².     Physical Exam  Constitutional:       General: She is not in acute  distress.     Appearance: She is well-developed. She is not diaphoretic.      Comments: Mild confusion in pacu   HENT:      Head: Normocephalic and atraumatic.      Right Ear: External ear normal.      Left Ear: External ear normal.      Nose: Nose normal.      Mouth/Throat:      Pharynx: No oropharyngeal exudate.   Eyes:      General: No scleral icterus.     Conjunctiva/sclera: Conjunctivae normal.      Pupils: Pupils are equal, round, and reactive to light.   Neck:      Thyroid: No thyromegaly.      Vascular: No JVD.      Trachea: No tracheal deviation.   Cardiovascular:      Rate and Rhythm: Normal rate and regular rhythm.      Pulses:           Dorsalis pedis pulses are 2+ on the right side and 2+ on the left side.        Posterior tibial pulses are 2+ on the right side and 2+ on the left side.      Heart sounds: Normal heart sounds. No murmur heard.    No gallop.   Pulmonary:      Effort: Pulmonary effort is normal. No respiratory distress.      Breath sounds: Normal breath sounds. No wheezing or rales.   Chest:      Chest wall: No tenderness.   Abdominal:      General: Bowel sounds are normal. There is no distension.      Palpations: Abdomen is soft. There is no mass.      Tenderness: There is no abdominal tenderness. There is no guarding or rebound.   Genitourinary:     Vagina: No vaginal discharge.   Musculoskeletal:         General: Tenderness and deformity present.      Cervical back: Neck supple.      Comments: Bandages in place to LLE. No pain reported with mild palpation   Lymphadenopathy:      Cervical: No cervical adenopathy.   Skin:     General: Skin is warm and dry.      Coloration: Skin is not pale.      Findings: No erythema or rash.   Neurological:      Mental Status: She is alert and oriented to person, place, and time.      Cranial Nerves: No cranial nerve deficit.      Motor: No abnormal muscle tone.      Coordination: Coordination normal.      Deep Tendon Reflexes: Reflexes are normal and  symmetric. Reflexes normal.   Psychiatric:         Behavior: Behavior normal.         Thought Content: Thought content normal.         Judgment: Judgment normal.            CRANIAL NERVES     CN III, IV, VI   Pupils are equal, round, and reactive to light.     Significant Labs: All pertinent labs within the past 24 hours have been reviewed.  Recent Lab Results         05/28/23  0435        Anion Gap 11       Aniso Moderate       Baso # 0.08       Basophilic Stippling Occasional       Basophil % 0.7       BUN 9       Calcium 8.9       Chloride 100       CO2 27       Creatinine 0.8       Differential Method Automated       eGFR >60.0       Eos # 0.7       Eosinophil % 6.0       Glucose 100       Gran # (ANC) 6.5       Gran % 56.5       Hematocrit 37.0       Hemoglobin 11.3       Immature Grans (Abs) 0.08  Comment: Mild elevation in immature granulocytes is non specific and   can be seen in a variety of conditions including stress response,   acute inflammation, trauma and pregnancy. Correlation with other   laboratory and clinical findings is essential.         Immature Granulocytes 0.7       Lymph # 1.9       Lymph % 16.5       Magnesium 1.7       MCH 29.7       MCHC 30.5       MCV 97       Mono # 2.3       Mono % 19.6       MPV 10.5       nRBC 0       Ovalocytes Occasional       Phosphorus 2.4       Platelets 254       Poikilocytosis Slight       Poly Moderate       Potassium 3.8       RBC 3.81       RDW 24.3       Sodium 138       Spherocytes Occasional       Teardrop Cells Occasional       WBC 11.53               Significant Imaging: I have reviewed all pertinent imaging results/findings within the past 24 hours.      Assessment/Plan:      * Left displaced femoral neck fracture  -s/p ground level mechanical fall at home on 5/26 with immediate severe Left hip pain   -imaging showed acute mildly displaced left femoral neck fracture   -seen by orthopedic with plan for OR on 5/28 for IDA with WBAT with posterior hip  precautions x 6 weeks post op.  -eliquis post op for 35 days until July 3rd  -oxycodone and multimodals post op for pain control   -likely will need SNF for rehabilitation and will send referrals monday  -SCDs for preop DVT PPX   -postop incentive spirometer use   Ortho f/u in 2 weeks for bandage removal  -bowel regimen  -vit D 43 at goal    Leukocytosis  -WBC 14  -likely stress induced in setting of femoral neck fracture  -afebrile and no signs of overt infection   UA negative on admit, procal neg      Hypophosphatemia  Replace PRN      Hypomagnesemia  -replace prn      Iron deficiency anemia secondary to blood loss (chronic)  On IV iron infusions at home with recent severe nose bleed earlier this year as contributor to chronic losses  -hg 11 on admit      Hypertension  -chronic and controlled   -continue home amlodipine and carvedilol   -holding HCTZ in setting of hypokalemia and hypomagnesemia   -hydralazine prn       Hypokalemia  Replaced in ER and thought to be hctz contributor on admit so held, appetite spotty at times also per family, replaced 5/27 PM with improvements 5/28      Gastroesophageal reflux disease without esophagitis  -continue home PPI       Dyslipidemia  -continue home statin       Coronary artery disease involving native coronary artery of native heart without angina pectoris  -denies chest pain or sob with chronic t wave inversions on EKG slightly more prominent than previous but denies chest pain and trop neg on admit  -continue GDMT with beta blocker and statin         VTE Risk Mitigation (From admission, onward)         Ordered     apixaban tablet 2.5 mg  2 times daily         05/28/23 1143     IP VTE HIGH RISK PATIENT  Once         05/28/23 0632     Place sequential compression device  Until discontinued         05/28/23 0632                Discharge Planning   DEBBIE: 5/31/2023     Code Status: Full Code   Is the patient medically ready for discharge?: No    Reason for patient still in  hospital (select all that apply): Patient trending condition                     Angela Nolan MD  Department of Hospital Medicine   [facility] - Surgery

## 2023-05-28 NOTE — SUBJECTIVE & OBJECTIVE
Interval history-seen in PACU and patient mildly confused post op as pulled off her gown and blankest and said she wants to get out of her bed and I redirected her and told her that she is still in post op and that surgery is done and went well from op note and that she cant get up until tomorrow with PT/OT and she said oh yeah and pulled the blankets back on. Plan for wbat with posterior hip precautions for 6 weeks with eliquis post op for dvt ppx. K and Mag improving with replacement. She said she ate meals yesterady with family visiting. BP stable at 138 systolic in pacu now. She denies pain in her hip currently. Will watch with close nursing monitoring will still mildly confused from anesthesia for safety until more oriented. Regular diet ordered for post op.        Review of patient's allergies indicates:  No Known Allergies    Current Facility-Administered Medications on File Prior to Encounter   Medication    sodium hyaluronate (EUFLEXXA) 10 mg/mL(mw 2.4 -3.6 million) injection 20 mg     Current Outpatient Medications on File Prior to Encounter   Medication Sig    carvediloL (COREG) 12.5 MG tablet Take 1 tablet (12.5 mg total) by mouth 2 (two) times daily with meals.    allopurinoL (ZYLOPRIM) 100 MG tablet Take 2 tablets (200 mg total) by mouth once daily.    amLODIPine (NORVASC) 10 MG tablet Take 1 tablet (10 mg total) by mouth once daily.    atorvastatin (LIPITOR) 80 MG tablet TAKE 1 TABLET BY MOUTH EVERY DAY    colchicine (MITIGARE) 0.6 mg Cap Take 1 capsule (0.6 mg total) by mouth once daily.    fluticasone propionate (FLONASE) 50 mcg/actuation nasal spray SPRAY ONCE INTO EACH NOSTRIL ONCE DAILY    hydroCHLOROthiazide (HYDRODIURIL) 25 MG tablet TAKE 1 TABLET BY MOUTH EVERY DAY    hydrocortisone 2.5 % ointment as needed.    ibuprofen (ADVIL,MOTRIN) 600 MG tablet Take 1 tablet (600 mg total) by mouth every 6 (six) hours as needed for Pain.    ketoconazole (NIZORAL) 2 % cream APPLY BETWEEN TOES TWICE DAILY X  2 WKS    LIDOcaine (LIDODERM) 5 % Place 1 patch onto the skin once daily. Remove & Discard patch within 12 hours or as directed by MD buenrostro (SINGULAIR) 10 mg tablet Take 1 tablet (10 mg total) by mouth every evening.    pantoprazole (PROTONIX) 40 MG tablet TAKE 1 TABLET BY MOUTH EVERY DAY     Family History       Problem Relation (Age of Onset)    Alcohol abuse Father    Aneurysm Mother    Gout Brother    Heart disease Brother    Hypertension Mother, Father    Kidney disease Brother    No Known Problems Daughter, Daughter, Daughter          Tobacco Use    Smoking status: Former     Packs/day: 0.50     Years: 20.00     Pack years: 10.00     Types: Cigarettes     Quit date: 1999     Years since quittin.4     Passive exposure: Past    Smokeless tobacco: Never   Substance and Sexual Activity    Alcohol use: Yes     Alcohol/week: 7.0 standard drinks     Types: 7 Glasses of wine per week    Drug use: No    Sexual activity: Yes     Partners: Male     Review of Systems   Constitutional:  Negative for activity change, appetite change, chills, diaphoresis, fatigue and fever.   HENT:  Negative for congestion, dental problem, drooling, ear discharge, ear pain, facial swelling, hearing loss, mouth sores, nosebleeds, postnasal drip, rhinorrhea, sinus pressure, sneezing, sore throat, tinnitus, trouble swallowing and voice change.    Eyes:  Negative for photophobia, pain, discharge, redness, itching and visual disturbance.   Respiratory:  Negative for apnea, cough, choking, chest tightness, shortness of breath, wheezing and stridor.    Cardiovascular:  Negative for chest pain, palpitations and leg swelling.   Gastrointestinal:  Negative for abdominal distention, abdominal pain, anal bleeding, blood in stool, constipation, diarrhea, nausea, rectal pain and vomiting.   Endocrine: Negative for cold intolerance, heat intolerance, polydipsia, polyphagia and polyuria.   Genitourinary:  Negative for decreased urine  volume, difficulty urinating, dyspareunia, dysuria, enuresis, flank pain, frequency, genital sores, hematuria, menstrual problem, pelvic pain, urgency, vaginal bleeding, vaginal discharge and vaginal pain.   Musculoskeletal:  Positive for arthralgias (chronic R shoulder pain and acute left hip pain), gait problem (baseline line unsteady gait) and myalgias (Left thigh and leg). Negative for back pain, joint swelling, neck pain and neck stiffness.   Skin:  Negative for color change, pallor, rash and wound.   Allergic/Immunologic: Negative for environmental allergies, food allergies and immunocompromised state.   Neurological:  Negative for dizziness, tremors, seizures, syncope, facial asymmetry, speech difficulty, weakness, light-headedness, numbness and headaches.   Hematological:  Negative for adenopathy. Does not bruise/bleed easily.   Psychiatric/Behavioral:  Negative for agitation, behavioral problems, confusion, decreased concentration, dysphoric mood, hallucinations, self-injury, sleep disturbance and suicidal ideas. The patient is not nervous/anxious and is not hyperactive.    Objective:     Vital Signs (Most Recent):  Temp: 98 °F (36.7 °C) (05/28/23 1145)  Pulse: 80 (05/28/23 1145)  Resp: 17 (05/28/23 1216)  BP: 138/80 (05/28/23 1145)  SpO2: 99 % (05/28/23 1145) Vital Signs (24h Range):  Temp:  [97.2 °F (36.2 °C)-98.2 °F (36.8 °C)] 98 °F (36.7 °C)  Pulse:  [68-80] 80  Resp:  [16-20] 17  SpO2:  [84 %-99 %] 99 %  BP: (113-149)/(61-82) 138/80     Weight: 55.8 kg (123 lb)  Body mass index is 21.79 kg/m².     Physical Exam  Constitutional:       General: She is not in acute distress.     Appearance: She is well-developed. She is not diaphoretic.      Comments: Mild confusion in pacu   HENT:      Head: Normocephalic and atraumatic.      Right Ear: External ear normal.      Left Ear: External ear normal.      Nose: Nose normal.      Mouth/Throat:      Pharynx: No oropharyngeal exudate.   Eyes:      General: No  scleral icterus.     Conjunctiva/sclera: Conjunctivae normal.      Pupils: Pupils are equal, round, and reactive to light.   Neck:      Thyroid: No thyromegaly.      Vascular: No JVD.      Trachea: No tracheal deviation.   Cardiovascular:      Rate and Rhythm: Normal rate and regular rhythm.      Pulses:           Dorsalis pedis pulses are 2+ on the right side and 2+ on the left side.        Posterior tibial pulses are 2+ on the right side and 2+ on the left side.      Heart sounds: Normal heart sounds. No murmur heard.    No gallop.   Pulmonary:      Effort: Pulmonary effort is normal. No respiratory distress.      Breath sounds: Normal breath sounds. No wheezing or rales.   Chest:      Chest wall: No tenderness.   Abdominal:      General: Bowel sounds are normal. There is no distension.      Palpations: Abdomen is soft. There is no mass.      Tenderness: There is no abdominal tenderness. There is no guarding or rebound.   Genitourinary:     Vagina: No vaginal discharge.   Musculoskeletal:         General: Tenderness and deformity present.      Cervical back: Neck supple.      Comments: Bandages in place to LLE. No pain reported with mild palpation   Lymphadenopathy:      Cervical: No cervical adenopathy.   Skin:     General: Skin is warm and dry.      Coloration: Skin is not pale.      Findings: No erythema or rash.   Neurological:      Mental Status: She is alert and oriented to person, place, and time.      Cranial Nerves: No cranial nerve deficit.      Motor: No abnormal muscle tone.      Coordination: Coordination normal.      Deep Tendon Reflexes: Reflexes are normal and symmetric. Reflexes normal.   Psychiatric:         Behavior: Behavior normal.         Thought Content: Thought content normal.         Judgment: Judgment normal.            CRANIAL NERVES     CN III, IV, VI   Pupils are equal, round, and reactive to light.     Significant Labs: All pertinent labs within the past 24 hours have been  reviewed.  Recent Lab Results         05/28/23  0435        Anion Gap 11       Aniso Moderate       Baso # 0.08       Basophilic Stippling Occasional       Basophil % 0.7       BUN 9       Calcium 8.9       Chloride 100       CO2 27       Creatinine 0.8       Differential Method Automated       eGFR >60.0       Eos # 0.7       Eosinophil % 6.0       Glucose 100       Gran # (ANC) 6.5       Gran % 56.5       Hematocrit 37.0       Hemoglobin 11.3       Immature Grans (Abs) 0.08  Comment: Mild elevation in immature granulocytes is non specific and   can be seen in a variety of conditions including stress response,   acute inflammation, trauma and pregnancy. Correlation with other   laboratory and clinical findings is essential.         Immature Granulocytes 0.7       Lymph # 1.9       Lymph % 16.5       Magnesium 1.7       MCH 29.7       MCHC 30.5       MCV 97       Mono # 2.3       Mono % 19.6       MPV 10.5       nRBC 0       Ovalocytes Occasional       Phosphorus 2.4       Platelets 254       Poikilocytosis Slight       Poly Moderate       Potassium 3.8       RBC 3.81       RDW 24.3       Sodium 138       Spherocytes Occasional       Teardrop Cells Occasional       WBC 11.53               Significant Imaging: I have reviewed all pertinent imaging results/findings within the past 24 hours.

## 2023-05-28 NOTE — ANESTHESIA PROCEDURE NOTES
Intubation    Date/Time: 5/28/2023 8:06 AM  Performed by: Mimi De La Rosa CRNA  Authorized by: Danis Saab MD     Intubation:     Induction:  Intravenous    Intubated:  Postinduction    Mask Ventilation:  Easy mask    Attempts:  1    Attempted By:  CRNA    Method of Intubation:  Video laryngoscopy    Blade:  Ibrahim 3    Laryngeal View Grade: Grade I - full view of cords      Difficult Airway Encountered?: No      Complications:  None    Airway Device:  Oral endotracheal tube    Airway Device Size:  7.0    Style/Cuff Inflation:  Cuffed    Inflation Amount (mL):  6    Tube secured:  20    Secured at:  The lips    Placement Verified By:  Capnometry and Revisualization with laryngoscopy    Complicating Factors:  None    Findings Post-Intubation:  BS equal bilateral and atraumatic/condition of teeth unchanged

## 2023-05-28 NOTE — CARE UPDATE
Ortho Post-Op Progress Note:    Patient seen and examined at bedside. She is POD0 from left IDA for fracture. She is comfortable and reports minimal pain. Vital signs are stable. Dressings are c/d/i.    RLE: 5/5 strength with ankle DF/PF and great toe flexion/extension. Sensation to light touch intact throughout lower extremity. Palpable DP pulse.    LLE: 5/5 strength with ankle DF/PF and great toe flexion/extension. Sensation to light touch intact throughout lower extremity. Palpable DP pulse.    Labs: reviewed  Imaging: reviewed    Plan:  -- Continue with current pain control regimen  -- PT/OT. WBAT  -- DVT prophylaxis: Eliquis 2.5mg BID, FCD's to be worn at all times

## 2023-05-29 ENCOUNTER — TELEPHONE (OUTPATIENT)
Dept: ORTHOPEDICS | Facility: CLINIC | Age: 73
End: 2023-05-29
Payer: MEDICARE

## 2023-05-29 PROBLEM — D62 ACUTE BLOOD LOSS ANEMIA: Status: ACTIVE | Noted: 2023-05-29

## 2023-05-29 LAB
ANION GAP SERPL CALC-SCNC: 8 MMOL/L (ref 8–16)
ANISOCYTOSIS BLD QL SMEAR: SLIGHT
BASO STIPL BLD QL SMEAR: ABNORMAL
BASOPHILS NFR BLD: 0 % (ref 0–1.9)
BUN SERPL-MCNC: 11 MG/DL (ref 8–23)
CALCIUM SERPL-MCNC: 7.6 MG/DL (ref 8.7–10.5)
CHLORIDE SERPL-SCNC: 98 MMOL/L (ref 95–110)
CO2 SERPL-SCNC: 25 MMOL/L (ref 23–29)
CREAT SERPL-MCNC: 0.8 MG/DL (ref 0.5–1.4)
DACRYOCYTES BLD QL SMEAR: ABNORMAL
DIFFERENTIAL METHOD: ABNORMAL
DOHLE BOD BLD QL SMEAR: PRESENT
EOSINOPHIL NFR BLD: 0 % (ref 0–8)
ERYTHROCYTE [DISTWIDTH] IN BLOOD BY AUTOMATED COUNT: 24.1 % (ref 11.5–14.5)
EST. GFR  (NO RACE VARIABLE): >60 ML/MIN/1.73 M^2
GLUCOSE SERPL-MCNC: 153 MG/DL (ref 70–110)
HCT VFR BLD AUTO: 29.1 % (ref 37–48.5)
HGB BLD-MCNC: 9.1 G/DL (ref 12–16)
HYPOCHROMIA BLD QL SMEAR: ABNORMAL
IMM GRANULOCYTES # BLD AUTO: ABNORMAL K/UL (ref 0–0.04)
IMM GRANULOCYTES NFR BLD AUTO: ABNORMAL % (ref 0–0.5)
LYMPHOCYTES NFR BLD: 11 % (ref 18–48)
MAGNESIUM SERPL-MCNC: 1.5 MG/DL (ref 1.6–2.6)
MCH RBC QN AUTO: 30.7 PG (ref 27–31)
MCHC RBC AUTO-ENTMCNC: 31.3 G/DL (ref 32–36)
MCV RBC AUTO: 98 FL (ref 82–98)
METAMYELOCYTES NFR BLD MANUAL: 2 %
MONOCYTES NFR BLD: 11 % (ref 4–15)
NEUTROPHILS NFR BLD: 69 % (ref 38–73)
NEUTS BAND NFR BLD MANUAL: 7 %
NRBC BLD-RTO: 0 /100 WBC
OVALOCYTES BLD QL SMEAR: ABNORMAL
PHOSPHATE SERPL-MCNC: 2.5 MG/DL (ref 2.7–4.5)
PLATELET # BLD AUTO: 216 K/UL (ref 150–450)
PLATELET BLD QL SMEAR: ABNORMAL
PMV BLD AUTO: 11.3 FL (ref 9.2–12.9)
POIKILOCYTOSIS BLD QL SMEAR: SLIGHT
POLYCHROMASIA BLD QL SMEAR: ABNORMAL
POTASSIUM SERPL-SCNC: 3.8 MMOL/L (ref 3.5–5.1)
RBC # BLD AUTO: 2.96 M/UL (ref 4–5.4)
SODIUM SERPL-SCNC: 131 MMOL/L (ref 136–145)
SPHEROCYTES BLD QL SMEAR: ABNORMAL
TOXIC GRANULES BLD QL SMEAR: PRESENT
WBC # BLD AUTO: 12.72 K/UL (ref 3.9–12.7)
WBC TOXIC VACUOLES BLD QL SMEAR: PRESENT

## 2023-05-29 PROCEDURE — 25000003 PHARM REV CODE 250: Performed by: STUDENT IN AN ORGANIZED HEALTH CARE EDUCATION/TRAINING PROGRAM

## 2023-05-29 PROCEDURE — 11000001 HC ACUTE MED/SURG PRIVATE ROOM

## 2023-05-29 PROCEDURE — 84100 ASSAY OF PHOSPHORUS: CPT | Performed by: HOSPITALIST

## 2023-05-29 PROCEDURE — 97116 GAIT TRAINING THERAPY: CPT

## 2023-05-29 PROCEDURE — 99233 SBSQ HOSP IP/OBS HIGH 50: CPT | Mod: ,,, | Performed by: HOSPITALIST

## 2023-05-29 PROCEDURE — 85027 COMPLETE CBC AUTOMATED: CPT | Performed by: HOSPITALIST

## 2023-05-29 PROCEDURE — 63600175 PHARM REV CODE 636 W HCPCS: Performed by: HOSPITALIST

## 2023-05-29 PROCEDURE — 97161 PT EVAL LOW COMPLEX 20 MIN: CPT

## 2023-05-29 PROCEDURE — 36415 COLL VENOUS BLD VENIPUNCTURE: CPT | Performed by: HOSPITALIST

## 2023-05-29 PROCEDURE — 25000003 PHARM REV CODE 250: Performed by: HOSPITALIST

## 2023-05-29 PROCEDURE — 97112 NEUROMUSCULAR REEDUCATION: CPT

## 2023-05-29 PROCEDURE — 97165 OT EVAL LOW COMPLEX 30 MIN: CPT

## 2023-05-29 PROCEDURE — 97530 THERAPEUTIC ACTIVITIES: CPT

## 2023-05-29 PROCEDURE — 85007 BL SMEAR W/DIFF WBC COUNT: CPT | Performed by: HOSPITALIST

## 2023-05-29 PROCEDURE — 99233 PR SUBSEQUENT HOSPITAL CARE,LEVL III: ICD-10-PCS | Mod: ,,, | Performed by: HOSPITALIST

## 2023-05-29 PROCEDURE — 80048 BASIC METABOLIC PNL TOTAL CA: CPT | Performed by: HOSPITALIST

## 2023-05-29 PROCEDURE — 25000003 PHARM REV CODE 250: Performed by: ANESTHESIOLOGY

## 2023-05-29 PROCEDURE — 83735 ASSAY OF MAGNESIUM: CPT | Performed by: HOSPITALIST

## 2023-05-29 RX ORDER — MUPIROCIN 20 MG/G
1 OINTMENT TOPICAL 2 TIMES DAILY
Status: DISCONTINUED | OUTPATIENT
Start: 2023-05-29 | End: 2023-05-31 | Stop reason: HOSPADM

## 2023-05-29 RX ORDER — CEFADROXIL 1000 MG/1
1 TABLET ORAL EVERY 12 HOURS
Status: DISCONTINUED | OUTPATIENT
Start: 2023-05-29 | End: 2023-05-31 | Stop reason: HOSPADM

## 2023-05-29 RX ORDER — LANOLIN ALCOHOL/MO/W.PET/CERES
400 CREAM (GRAM) TOPICAL 2 TIMES DAILY
Status: COMPLETED | OUTPATIENT
Start: 2023-05-29 | End: 2023-05-29

## 2023-05-29 RX ADMIN — Medication 400 MG: at 08:05

## 2023-05-29 RX ADMIN — PANTOPRAZOLE SODIUM 40 MG: 40 TABLET, DELAYED RELEASE ORAL at 08:05

## 2023-05-29 RX ADMIN — MONTELUKAST 10 MG: 10 TABLET, FILM COATED ORAL at 08:05

## 2023-05-29 RX ADMIN — ALLOPURINOL 200 MG: 100 TABLET ORAL at 08:05

## 2023-05-29 RX ADMIN — PREGABALIN 75 MG: 75 CAPSULE ORAL at 08:05

## 2023-05-29 RX ADMIN — METHOCARBAMOL 500 MG: 500 TABLET ORAL at 05:05

## 2023-05-29 RX ADMIN — Medication 2 TABLET: at 08:05

## 2023-05-29 RX ADMIN — MELATONIN TAB 3 MG 6 MG: 3 TAB at 08:05

## 2023-05-29 RX ADMIN — AMLODIPINE BESYLATE 10 MG: 5 TABLET ORAL at 08:05

## 2023-05-29 RX ADMIN — CARVEDILOL 12.5 MG: 12.5 TABLET, FILM COATED ORAL at 08:05

## 2023-05-29 RX ADMIN — ATORVASTATIN CALCIUM 80 MG: 40 TABLET, FILM COATED ORAL at 08:05

## 2023-05-29 RX ADMIN — CHOLECALCIFEROL TAB 25 MCG (1000 UNIT) 2000 UNITS: 25 TAB at 08:05

## 2023-05-29 RX ADMIN — SENNOSIDES AND DOCUSATE SODIUM 1 TABLET: 50; 8.6 TABLET ORAL at 08:05

## 2023-05-29 RX ADMIN — POLYETHYLENE GLYCOL 3350 17 G: 17 POWDER, FOR SOLUTION ORAL at 08:05

## 2023-05-29 RX ADMIN — DEXTROSE, SODIUM CHLORIDE, AND POTASSIUM CHLORIDE: 5; .45; .15 INJECTION INTRAVENOUS at 08:05

## 2023-05-29 RX ADMIN — ACETAMINOPHEN 1000 MG: 500 TABLET ORAL at 12:05

## 2023-05-29 RX ADMIN — MUPIROCIN 1 G: 20 OINTMENT TOPICAL at 08:05

## 2023-05-29 RX ADMIN — CELECOXIB 200 MG: 200 CAPSULE ORAL at 08:05

## 2023-05-29 RX ADMIN — CARVEDILOL 12.5 MG: 12.5 TABLET, FILM COATED ORAL at 05:05

## 2023-05-29 RX ADMIN — MUPIROCIN 1 G: 20 OINTMENT TOPICAL at 09:05

## 2023-05-29 RX ADMIN — ONDANSETRON 4 MG: 2 INJECTION INTRAMUSCULAR; INTRAVENOUS at 08:05

## 2023-05-29 RX ADMIN — CEFADROXIL 1 G: 1000 TABLET ORAL at 08:05

## 2023-05-29 RX ADMIN — APIXABAN 2.5 MG: 2.5 TABLET, FILM COATED ORAL at 08:05

## 2023-05-29 RX ADMIN — METHOCARBAMOL 500 MG: 500 TABLET ORAL at 08:05

## 2023-05-29 RX ADMIN — OXYCODONE HYDROCHLORIDE 5 MG: 5 TABLET ORAL at 09:05

## 2023-05-29 RX ADMIN — ACETAMINOPHEN 1000 MG: 500 TABLET ORAL at 05:05

## 2023-05-29 RX ADMIN — METHOCARBAMOL 500 MG: 500 TABLET ORAL at 12:05

## 2023-05-29 NOTE — PLAN OF CARE
[facility]  Initial Discharge Assessment       Primary Care Provider: Effie Olmedo MD    Admission Diagnosis: Hip fracture [S72.009A]  Leg pain [M79.606]  Ankle pain [M25.579]  Hypoxia [R09.02]  Pre-op exam [Z01.818]    Admission Date: 5/26/2023  Expected Discharge Date: 5/31/2023    Transition of Care Barriers: None    Payor: HU[employer] MANAGED MEDICARE / Plan: HU[employer] MEDICARE HMO / Product Type: Capitation /     Extended Emergency Contact Information  Primary Emergency Contact: Sandie Floyd  Address: [address] [place] [address] [place]  Work Phone: [phone]  Mobile Phone: [phone]  Relation: Significant other  Secondary Emergency Contact: Ramila Floyd  Address: [address] [place] [address] [id number] [place] [address]  Mobile Phone: [phone]  Relation: Daughter    Discharge Plan A: Skilled Nursing Facility  Discharge Plan B: Home Health, Home with family      [id number] [place], [place] - [address] [facility]  [address] [address]  Phone: [phone] Fax: [phone]    [facility] [address] [facility]  [address] [facility] [address]  Phone: [phone] Fax: [phone]      Initial Assessment (most recent)       Adult Discharge Assessment - 05/29/23 1103          Discharge Assessment    Assessment Type Discharge Planning Assessment     Confirmed/corrected address, phone number and insurance Yes     Confirmed Demographics Correct on Facesheet     Source of Information patient;family   Ashanti daughter    Communicated DEBBIE with patient/caregiver Yes     People in Home spouse     Do you expect to return to your current living situation? Yes     Do you have help at home or someone to help you manage your care at home? Yes     Who are your caregiver(s) and their phone number(s)? Ashanti daughter and spouse     Prior to hospitilization cognitive status: Alert/Oriented      Current cognitive status: Alert/Oriented     Home Layout Able to live on 1st floor     Equipment Currently Used at Home cane, straight;rollator     Do you currently have service(s) that help you manage your care at home? No     Do you take prescription medications? Yes     Do you have prescription coverage? Yes     Do you have any problems affording any of your prescribed medications? No     Is the patient taking medications as prescribed? yes     How do you get to doctors appointments? family or friend will provide     Are you on dialysis? No     Do you take coumadin? No     Discharge Plan A Skilled Nursing Facility     Discharge Plan B Home Health;Home with family     DME Needed Upon Discharge  none     Discharge Plan discussed with: Patient;Adult children   Ramila- daughter    Transition of Care Barriers None                   Patient lives with spouse in a single story home with four entry steps and railing. Patient amenable to therapy  recommendations of SNF placement with [facility] being her first choice. Patient agreeable with multiple referrals being set to facilities. Discharge pending medical stability and accepting facility.

## 2023-05-29 NOTE — SUBJECTIVE & OBJECTIVE
Principal Problem:Left displaced femoral neck fracture    Principal Orthopedic Problem: same sp IDA 5/28/23    Interval History: NAEON.Vs wnl. Pain controlled. No numbness or tingling    Review of patient's allergies indicates:  No Known Allergies    Current Facility-Administered Medications   Medication    0.9%  NaCl infusion (for blood administration)    acetaminophen tablet 1,000 mg    albuterol-ipratropium 2.5 mg-0.5 mg/3 mL nebulizer solution 3 mL    allopurinoL tablet 200 mg    amLODIPine tablet 10 mg    apixaban tablet 2.5 mg    atorvastatin tablet 80 mg    bisacodyL suppository 10 mg    calcium-vitamin D3 500 mg-5 mcg (200 unit) per tablet 2 tablet    carvediloL tablet 12.5 mg    ceFAZolin 2 g in dextrose 5 % in water (D5W) 5 % 50 mL IVPB (MB+)    celecoxib capsule 200 mg    dextrose 5 % and 0.45 % NaCl with KCl 20 mEq infusion    fentaNYL 50 mcg/mL injection 25 mcg    fluticasone propionate 50 mcg/actuation nasal spray 100 mcg    LIDOcaine (PF) 10 mg/ml (1%) injection 10 mg    magnesium oxide tablet 400 mg    magnesium oxide tablet 400 mg    melatonin tablet 6 mg    methocarbamoL tablet 500 mg    montelukast tablet 10 mg    morphine injection 2 mg    mupirocin 2 % ointment 1 g    mupirocin 2 % ointment 1 g    mupirocin 2 % ointment    ondansetron injection 4 mg    oxyCODONE immediate release tablet 5 mg    pantoprazole EC tablet 40 mg    polyethylene glycol packet 17 g    pregabalin capsule 75 mg    ROPIvacaine (PF) 2 mg/ml (0.2%) solution    senna-docusate 8.6-50 mg per tablet 1 tablet    sodium chloride 0.9% flush 10 mL    sodium chloride 0.9% flush 10 mL    sodium chloride 0.9% flush 3 mL    tranexamic acid (CYKLOKAPRON) 3,000 mg in sodium chloride 0.9% SolP 100 mL    vitamin D 1000 units tablet 2,000 Units     Facility-Administered Medications Ordered in Other Encounters   Medication    sodium hyaluronate (EUFLEXXA) 10 mg/mL(mw 2.4 -3.6 million) injection 20 mg     Objective:     Vital Signs (Most  Recent):  Temp: 97.6 °F (36.4 °C) (05/29/23 1134)  Pulse: 80 (05/29/23 1134)  Resp: 18 (05/29/23 1134)  BP: 125/68 (05/29/23 1134)  SpO2: (!) 89 % (05/29/23 1134) Vital Signs (24h Range):  Temp:  [96.6 °F (35.9 °C)-98.5 °F (36.9 °C)] 97.6 °F (36.4 °C)  Pulse:  [] 80  Resp:  [15-20] 18  SpO2:  [89 %-99 %] 89 %  BP: (118-143)/(57-80) 125/68     Weight: 55.8 kg (123 lb)     Body mass index is 21.79 kg/m².    No intake or output data in the 24 hours ending 05/29/23 1135     Ortho/SPM Exam  A&Ox3    LLE  Posterior hip dressing cdi  NVI     Significant Labs: BMP:   Recent Labs   Lab 05/29/23  0303   *   *   K 3.8   CL 98   CO2 25   BUN 11   CREATININE 0.8   CALCIUM 7.6*   MG 1.5*     CBC:   Recent Labs   Lab 05/28/23  0435 05/28/23  1456 05/29/23  0303   WBC 11.53 14.00* 12.72*   HGB 11.3* 10.2* 9.1*   HCT 37.0 32.1* 29.1*    228 216     CMP:   Recent Labs   Lab 05/28/23  0435 05/29/23  0303    131*   K 3.8 3.8    98   CO2 27 25    153*   BUN 9 11   CREATININE 0.8 0.8   CALCIUM 8.9 7.6*   ANIONGAP 11 8     All pertinent labs within the past 24 hours have been reviewed.    Significant Imaging: I have reviewed and interpreted all pertinent imaging results/findings.
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
## 2023-05-29 NOTE — PLAN OF CARE
05/29/23 1137   Post-Acute Status   Post-Acute Authorization Placement   Post-Acute Placement Status Referrals Sent     SW faxed referrals to [facility] (1st choice, Oneyda DIMAS, Vito Cole, Trevor JOHNSON, Olga Nick, and Patricia via [facility] for review. SW will follow up as needed.      Fara Cohen, TIMA  Case Management   [facility]   Ext. [phone]

## 2023-05-29 NOTE — PLAN OF CARE
Problem: Infection  Goal: Absence of Infection Signs and Symptoms  Outcome: Ongoing, Progressing     Problem: Adult Inpatient Plan of Care  Goal: Plan of Care Review  Outcome: Ongoing, Progressing  Goal: Patient-Specific Goal (Individualized)  Outcome: Ongoing, Progressing  Goal: Absence of Hospital-Acquired Illness or Injury  Outcome: Ongoing, Progressing  Goal: Optimal Comfort and Wellbeing  Outcome: Ongoing, Progressing  Goal: Readiness for Transition of Care  Outcome: Ongoing, Progressing     Problem: Adjustment to Injury (Hip Fracture Medical Management)  Goal: Optimal Coping with Change in Health Status  Outcome: Ongoing, Progressing     Problem: Bleeding (Hip Fracture Medical Management)  Goal: Absence of Bleeding  Outcome: Ongoing, Progressing     Problem: Bowel Elimination Impaired (Hip Fracture Medical Management)  Goal: Effective Bowel Elimination  Outcome: Ongoing, Progressing     Problem: Cognitive Decline Risk (Hip Fracture Medical Management)  Goal: Baseline Cognitive Function Maintained  Outcome: Ongoing, Progressing     Problem: Embolism (Hip Fracture Medical Management)  Goal: Absence of Embolism  Outcome: Ongoing, Progressing     Problem: Fracture Stabilization and Management (Hip Fracture Medical Management)  Goal: Fracture Stability  Outcome: Ongoing, Progressing     Problem: Functional Ability Impaired (Hip Fracture Medical Management)  Goal: Optimal Functional Performance  Outcome: Ongoing, Progressing     Problem: Pain (Hip Fracture Medical Management)  Goal: Acceptable Pain Level  Outcome: Ongoing, Progressing     Problem: Urinary Elimination Impaired (Hip Fracture Medical Management)  Goal: Effective Urinary Elimination  Outcome: Ongoing, Progressing     Problem: Bleeding (Surgery Nonspecified)  Goal: Absence of Bleeding  Outcome: Ongoing, Progressing     Problem: Bowel Motility Impaired (Surgery Nonspecified)  Goal: Effective Bowel Elimination  Outcome: Ongoing, Progressing     Problem:  Fluid and Electrolyte Imbalance (Surgery Nonspecified)  Goal: Fluid and Electrolyte Balance  Outcome: Ongoing, Progressing     Problem: Glycemic Control Impaired (Surgery Nonspecified)  Goal: Blood Glucose Level Within Targeted Range  Outcome: Ongoing, Progressing

## 2023-05-29 NOTE — ANESTHESIA POSTPROCEDURE EVALUATION
Anesthesia Post Evaluation    Patient: Jessica Floyd    Procedure(s) Performed: Procedure(s) (LRB):  TOTAL HIP ARTHROPLASTY (Left)    Final Anesthesia Type: general      Patient location during evaluation: PACU  Patient participation: Yes- Able to Participate  Level of consciousness: awake and alert  Post-procedure vital signs: reviewed and stable  Pain management: adequate  Airway patency: patent    PONV status at discharge: No PONV  Anesthetic complications: no      Cardiovascular status: blood pressure returned to baseline  Respiratory status: unassisted  Hydration status: euvolemic  Follow-up not needed.          Vitals Value Taken Time   /59 05/29/23 0454   Temp 36.9 °C (98.5 °F) 05/29/23 0454   Pulse 62 05/29/23 0454   Resp 15 05/29/23 0454   SpO2 93 % 05/29/23 0454         Event Time   Out of Recovery 05/28/2023 12:30:00         Pain/Paris Score: Pain Rating Prior to Med Admin: 2 (5/28/2023 11:45 PM)  Pain Rating Post Med Admin: 1 (5/29/2023 12:35 AM)  Paris Score: 10 (5/28/2023  8:00 PM)

## 2023-05-29 NOTE — PLAN OF CARE
Problem: Occupational Therapy  Goal: Occupational Therapy Goal  Description: Goals to be met by:  6/6/23    Patient will increase functional independence with ADLs by performing:    UE Dressing with Hayden.  LE Dressing with Modified Hayden.  Grooming while standing at sink with Modified Hayden.  Toileting from toilet with Modified Hayden for hygiene and clothing management.   Toilet transfer to toilet with Modified Hayden.  Outcome: Ongoing, Progressing   Pt. Evaluated and goals established
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
## 2023-05-29 NOTE — PLAN OF CARE
PT Evaluation completed and PT POC established.    Problem: Physical Therapy  Goal: Physical Therapy Goal  Description: Goals to be met by: 2023     Patient will increase functional independence with mobility by performin. Supine to sit with [place]  2. Sit to supine with [place]  3. Sit to stand transfer with Supervision  4. Bed to chair transfer with Supervision using LRAD  5. Gait  x 100 feet with Supervision using LRAD.   6. Ascend/descend 3 stair with B Handrails Minimal Assistance using LRAD.     Outcome: Ongoing, Progressing

## 2023-05-29 NOTE — TELEPHONE ENCOUNTER
Post op call placed to pt.  Pt states she is doing great.  No issues or complaints at this time.  Pt is currently still admitted.  Pt is aware of her post op appointments.  She uses [facility].  Provided pt with my contact info should any questions or concerns arise.  Pt verbalized understanding

## 2023-05-29 NOTE — PROGRESS NOTES
[facility] - Surgery  Orthopedics  Progress Note    Patient Name: Jessica Floyd  MRN: [id number]  Admission Date: 5/26/2023  Hospital Length of Stay: 3 days  Attending Provider: Angela Nolan MD  Primary Care Provider: Effie Olmedo MD  Follow-up For: Procedure(s) (LRB):  TOTAL HIP ARTHROPLASTY (Left)    Post-Operative Day: 1 Day Post-Op  Subjective:     Principal Problem:Left displaced femoral neck fracture    Principal Orthopedic Problem: same sp IDA 5/28/23    Interval History: NAEON.Vs wnl. Pain controlled. No numbness or tingling    Review of patient's allergies indicates:  No Known Allergies    Current Facility-Administered Medications   Medication    0.9%  NaCl infusion (for blood administration)    acetaminophen tablet 1,000 mg    albuterol-ipratropium 2.5 mg-0.5 mg/3 mL nebulizer solution 3 mL    allopurinoL tablet 200 mg    amLODIPine tablet 10 mg    apixaban tablet 2.5 mg    atorvastatin tablet 80 mg    bisacodyL suppository 10 mg    calcium-vitamin D3 500 mg-5 mcg (200 unit) per tablet 2 tablet    carvediloL tablet 12.5 mg    ceFAZolin 2 g in dextrose 5 % in water (D5W) 5 % 50 mL IVPB (MB+)    celecoxib capsule 200 mg    dextrose 5 % and 0.45 % NaCl with KCl 20 mEq infusion    fentaNYL 50 mcg/mL injection 25 mcg    fluticasone propionate 50 mcg/actuation nasal spray 100 mcg    LIDOcaine (PF) 10 mg/ml (1%) injection 10 mg    magnesium oxide tablet 400 mg    magnesium oxide tablet 400 mg    melatonin tablet 6 mg    methocarbamoL tablet 500 mg    montelukast tablet 10 mg    morphine injection 2 mg    mupirocin 2 % ointment 1 g    mupirocin 2 % ointment 1 g    mupirocin 2 % ointment    ondansetron injection 4 mg    oxyCODONE immediate release tablet 5 mg    pantoprazole EC tablet 40 mg    polyethylene glycol packet 17 g    pregabalin capsule 75 mg    ROPIvacaine (PF) 2 mg/ml (0.2%) solution    senna-docusate 8.6-50 mg per tablet 1 tablet    sodium chloride 0.9% flush 10 mL     sodium chloride 0.9% flush 10 mL    sodium chloride 0.9% flush 3 mL    tranexamic acid (CYKLOKAPRON) 3,000 mg in sodium chloride 0.9% SolP 100 mL    vitamin D 1000 units tablet 2,000 Units     Facility-Administered Medications Ordered in Other Encounters   Medication    sodium hyaluronate (EUFLEXXA) 10 mg/mL(mw 2.4 -3.6 million) injection 20 mg     Objective:     Vital Signs (Most Recent):  Temp: 97.6 °F (36.4 °C) (05/29/23 1134)  Pulse: 80 (05/29/23 1134)  Resp: 18 (05/29/23 1134)  BP: 125/68 (05/29/23 1134)  SpO2: (!) 89 % (05/29/23 1134) Vital Signs (24h Range):  Temp:  [96.6 °F (35.9 °C)-98.5 °F (36.9 °C)] 97.6 °F (36.4 °C)  Pulse:  [] 80  Resp:  [15-20] 18  SpO2:  [89 %-99 %] 89 %  BP: (118-143)/(57-80) 125/68     Weight: 55.8 kg (123 lb)     Body mass index is 21.79 kg/m².    No intake or output data in the 24 hours ending 05/29/23 1135     Ortho/SPM Exam  A&Ox3    LLE  Posterior hip dressing cdi  NVI     Significant Labs: BMP:   Recent Labs   Lab 05/29/23  0303   *   *   K 3.8   CL 98   CO2 25   BUN 11   CREATININE 0.8   CALCIUM 7.6*   MG 1.5*     CBC:   Recent Labs   Lab 05/28/23  0435 05/28/23  1456 05/29/23  0303   WBC 11.53 14.00* 12.72*   HGB 11.3* 10.2* 9.1*   HCT 37.0 32.1* 29.1*    228 216     CMP:   Recent Labs   Lab 05/28/23  0435 05/29/23  0303    131*   K 3.8 3.8    98   CO2 27 25    153*   BUN 9 11   CREATININE 0.8 0.8   CALCIUM 8.9 7.6*   ANIONGAP 11 8     All pertinent labs within the past 24 hours have been reviewed.    Significant Imaging: I have reviewed and interpreted all pertinent imaging results/findings.    Assessment/Plan:     * Left displaced femoral neck fracture  72F, fall 5.27.23, Left femoral neck fracture     5.28.23 - left total hip arthroplasty for femoral neck fracture    Plan  Antibiotics x 24 hr (IV), followed by 1 wk of PO abx  Eliquis x 4 wks for DVT prophylaxis  Weight-bearing as tolerated Left lower  extremity  Posterior hip precautions Left lower extremity x6 weeks  Multimodal pain control  Hospitalist comanagement  Physical therapy  Calcium, vitamin-D, boost  Fragility fracture Clinic referral  Follow-up postop 2 weeks          Jessica Sepulveda MD  Orthopedics  [facility] - Surgery

## 2023-05-29 NOTE — PT/OT/SLP EVAL
Occupational Therapy   Co-Evaluation  Co-treatment with PT for maximal pt participation, safety, and activity tolerance     Name: Jessica Floyd  MRN: [id number]  Admitting Diagnosis: Left displaced femoral neck fracture  Recent Surgery: Procedure(s) (LRB):  TOTAL HIP ARTHROPLASTY (Left) 1 Day Post-Op    Recommendations:     Discharge Recommendations: nursing facility, skilled  Discharge Equipment Recommendations:  to be determined by next level of care  Barriers to discharge:  Inaccessible home environment, Decreased caregiver support    Assessment:     Jessica Floyd is a 72 y.o. female with a medical diagnosis of Left displaced femoral neck fracture.  She presents with willingness to participate in today's session and completed a bed to chair step transfer. Performance deficits affecting function: decreased lower extremity function, weakness, impaired endurance, impaired functional mobility, gait instability, decreased ROM, orthopedic precautions, impaired self care skills, impaired balance, pain, decreased safety awareness.      Rehab Prognosis: Good; patient would benefit from acute skilled OT services to address these deficits and reach maximum level of function.       Plan:     Patient to be seen 3 x/week to address the above listed problems via neuromuscular re-education, therapeutic activities, self-care/home management, therapeutic exercises  Plan of Care Expires: 06/28/23  Plan of Care Reviewed with: patient    Subjective     Chief Complaint: no reported  Patient/Family Comments/goals: she is bestie referring to her daughter     Occupational Profile:  Living Environment: lives in a [facility] with 3 EMILY with B HR, (Kitchen has a 1STE). Pt. Home has a WIS with built in shower seat and grab bars   Previous level of function: Ind in all adls and mobility   Roles and Routines: wife, mom and grandmother   Equipment Used at Home: rollator, raised toilet, cane, straight  Assistance upon Discharge: Limited assistance from  spouse    Pain/Comfort:  Pain Rating 1: 0/10  Pain Rating Post-Intervention 1: 0/10    Patients cultural, spiritual, Yazidi conflicts given the current situation: no    Objective:     Communicated with: RN prior to session.  Patient found HOB elevated with FCD, peripheral IV, oxygen upon OT entry to room.    General Precautions: Standard, fall  Orthopedic Precautions: LLE posterior precautions, LLE weight bearing as tolerated  Braces: N/A  Respiratory Status: Room air    Occupational Performance:    Bed Mobility:    Patient completed Scooting/Bridging with contact guard assistance  Patient completed Supine to Sit with contact guard assistance    Functional Mobility/Transfers:  Patient completed Sit <> Stand Transfer with contact guard assistance  with  rolling walker   Patient completed Bed <> Chair Transfer using Step Transfer technique with contact guard assistance with rolling walker      Activities of Daily Living:  Upper Body Dressing: moderate assistance don gown posteriorly   Lower Body Dressing: moderate assistance don sock on  R foot     Cognitive/Visual Perceptual:  Cognitive/Psychosocial Skills:     -       Oriented to: Person, Place, Time, and Situation   -       Follows Commands/attention:Follows multistep  commands  -       Communication: clear/fluent  -       Memory: No Deficits noted  -       Safety awareness/insight to disability: impaired   -       Mood/Affect/Coping skills/emotional control: Appropriate to situation  Visual/Perceptual:      -Intact wears eye glasses     Physical Exam:  Balance:    Dominant hand:    -       R hand   Upper Extremity Range of Motion:   (R rotator cuff injury)  -       Right Upper Extremity: WFL with PROM, however AROM was severely limited with ~60 degrees shoulder flexion Actively (no reports of pain with movement )  -       Left Upper Extremity: WFL   Upper Extremity Strength:    -       Right Upper Extremity: WFL 4/5 grossly   -       Left Upper Extremity: WFL  5/5 grossly   Strength:    -       Right Upper Extremity: WFL  -       Left Upper Extremity: WFL    AMPAC 6 Click ADL:  AMPA Total Score: 17    Treatment & Education:  Pt educated on role of occupational therapy, POC, and safety during ADLs and functional mobility. Pt and OT discussed importance of safe, continued mobility to optimize daily living skills. Pt verbalized understanding. Pt given instruction to call for medical staff/nurse for assistance.     Patient left up in chair with all lines intact, call button in reach, and Daughter present    GOALS:   Multidisciplinary Problems       Occupational Therapy Goals          Problem: Occupational Therapy    Goal Priority Disciplines Outcome Interventions   Occupational Therapy Goal     OT, PT/OT Ongoing, Progressing    Description: Goals to be met by:  6/6/23    Patient will increase functional independence with ADLs by performing:    UE Dressing with [place].  LE Dressing with Modified [place].  Grooming while standing at sink with Modified [place].  Toileting from toilet with Modified [place] for hygiene and clothing management.   Toilet transfer to toilet with Modified [place].                       History:     Past Medical History:   Diagnosis Date    Allergic rhinitis     Amblyopia     Carotid stenosis     Coronary atherosclerosis     Outside [facility] 6/2014: 20% mLAD, 50% mCx, 20%, mRCA    Dyslipidemia     ESBL (extended spectrum beta-lactamase) producing bacteria infection     UTI    Hypertension     Nausea and vomiting 05/26/2017    Subarachnoid hemorrhage due to ruptured aneurysm 1999         Past Surgical History:   Procedure Laterality Date    ADENOIDECTOMY      ANTERIOR CRUCIATE LIGAMENT REPAIR  2012    APPENDECTOMY      CATARACT EXTRACTION W/  INTRAOCULAR LENS IMPLANT Right 11/07/2022    Procedure: EXTRACTION, CATARACT, WITH IOL INSERTION;  Surgeon: Higinio Cristina MD;  Location: [facility];  Service: Ophthalmology;  Laterality:  Right;    CATARACT EXTRACTION W/  INTRAOCULAR LENS IMPLANT Left 2022    Procedure: EXTRACTION, CATARACT, WITH IOL INSERTION;  Surgeon: Higinio Cristina MD;  Location: [facility];  Service: Ophthalmology;  Laterality: Left;    CEREBRAL ANEURYSM REPAIR      clip     SECTION      DENTAL SURGERY      EYE SURGERY Bilateral     cataract removal and lens implants    HIP ARTHROPLASTY Left 2023    Procedure: TOTAL HIP ARTHROPLASTY;  Surgeon: Juan Wan MD;  Location: [address];  Service: Orthopedics;  Laterality: Left;    TONSILLECTOMY         Time Tracking:     OT Date of Treatment: 23  OT Start Time: 840  OT Stop Time: 912  OT Total Time (min): 32 min    Billable Minutes:Evaluation 10  Therapeutic Activity 2023

## 2023-05-29 NOTE — PT/OT/SLP EVAL
"Physical Therapy Co-Evaluation    Patient Name:  Jessica Floyd   MRN:  [id number]    Recommendations:     Discharge Recommendations: nursing facility, skilled   Discharge Equipment Recommendations: to be determined by next level of care   Barriers to discharge: Inaccessible home and Decreased caregiver support    Assessment:     Jessica Floyd is a 72 y.o. female admitted with a medical diagnosis of Left displaced femoral neck fracture.  She presents with the following impairments/functional limitations: weakness, impaired endurance, gait instability, impaired functional mobility, impaired balance, decreased lower extremity function, decreased upper extremity function, impaired cardiopulmonary response to activity.    AAOx4, cooperative, L LE Posterior hip WBAT. Pt tolerated amb to bedside chair with RW 1 person for safety. At EOB, pt had 1 episode of anxiety and fear of falling; daughter reported 1 bad 6 months rehab stay experience. Pt will benefit from skilled PT services while in house in order to address the aforementioned deficits.      Rehab Prognosis: Good; patient would benefit from acute skilled PT services to address these deficits and reach maximum level of function.    Recent Surgery: Procedure(s) (LRB):  TOTAL HIP ARTHROPLASTY (Left) 1 Day Post-Op    Plan:     During this hospitalization, patient to be seen 3 x/week to address the identified rehab impairments via gait training, therapeutic activities, therapeutic exercises, neuromuscular re-education and progress toward the following goals:    Plan of Care Expires:  06/29/23    Subjective     "She (daughter) is my bestie"    Pain/Comfort:  Pain Rating 1: 0/10  Pain Rating Post-Intervention 1: 0/10    Patients cultural, spiritual, Confucianism conflicts given the current situation: no    Living Environment:  Per pt, pt and spouse reside in [facility] with 3 EMILY B HR. Pt has additional 1 step into the kitchen area. Pt has a walk-in shower with built in seat and grab " bars in place.  Prior to admission, patients level of function was I, reported amb 2000 steps/day.  Equipment used at home: cane, straight, raised toilet, rollator.  DME owned (not currently used):  rollator and SPC .  Upon discharge, patient will have limited assistance from spouse.    Objective:     Communicated with RN prior to session.  Patient found HOB elevated with FCD, peripheral IV, oxygen  upon PT entry to room.    General Precautions: Standard, fall  Orthopedic Precautions:LLE posterior precautions, LLE weight bearing as tolerated   Braces: N/A  Respiratory Status: Nasal cannula, flow 2 L/min    Exams:  Cognitive Exam:  Patient is oriented to AAOx4  RLE ROM: WFL  RLE Strength: WFL  LLE ROM: WFL  LLE Strength: Grossly 3-/5    Functional Mobility:  Bed Mobility:     Scooting: contact guard assistance  Supine to Sit with HOB to 70 degrees: contact guard assistance  Transfers:     Sit to Stand:  contact guard assistance with rolling walker  Bed to Chair: contact guard assistance with  rolling walker  using  Step Transfer  Gait: Pt amb 4 steps RW CGA. Pt presented with decreased aliza, B shortened step, no LOB  Balance:   Good sitting balance  Fair standing balance      AM-PAC 6 CLICK MOBILITY  Total Score:17       Treatment & Education:  Discussed sitting upright in chair >1hr, pt agreeable  Extensive discussion about living environment and PT POC with daughter. Per daughter, pt's spouse will not be able to assist thus not having enough support at home for cleaning, etc. After lengthy discussion, pt may benefit from aide/caregiver, CM/SW notified for additional resources.    Educated pt on PT role/POC  Educated pt on importance of OOB activity and daily ambulation   Pt educated on proper body mechanics, safety techniques, and energy conservation with PT facilitation and cueing throughout session   Pt verbalized understanding      Patient left up in chair with all lines intact, call button in reach, RN  notified, and OT and daughter present.    GOALS:   Multidisciplinary Problems       Physical Therapy Goals          Problem: Physical Therapy    Goal Priority Disciplines Outcome Goal Variances Interventions   Physical Therapy Goal     PT, PT/OT Ongoing, Progressing     Description: Goals to be met by: 2023     Patient will increase functional independence with mobility by performin. Supine to sit with [place]  2. Sit to supine with [place]  3. Sit to stand transfer with Supervision  4. Bed to chair transfer with Supervision using LRAD  5. Gait  x 100 feet with Supervision using LRAD.   6. Ascend/descend 3 stair with B Handrails Minimal Assistance using LRAD.                          History:     Past Medical History:   Diagnosis Date    Allergic rhinitis     Amblyopia     Carotid stenosis     Coronary atherosclerosis     Outside [facility] 2014: 20% mLAD, 50% mCx, 20%, mRCA    Dyslipidemia     ESBL (extended spectrum beta-lactamase) producing bacteria infection     UTI    Hypertension     Nausea and vomiting 2017    Subarachnoid hemorrhage due to ruptured aneurysm        Past Surgical History:   Procedure Laterality Date    ADENOIDECTOMY      ANTERIOR CRUCIATE LIGAMENT REPAIR      APPENDECTOMY      CATARACT EXTRACTION W/  INTRAOCULAR LENS IMPLANT Right 2022    Procedure: EXTRACTION, CATARACT, WITH IOL INSERTION;  Surgeon: Higinio Cristina MD;  Location: [facility] OR;  Service: Ophthalmology;  Laterality: Right;    CATARACT EXTRACTION W/  INTRAOCULAR LENS IMPLANT Left 2022    Procedure: EXTRACTION, CATARACT, WITH IOL INSERTION;  Surgeon: Higinio Cristina MD;  Location: [facility];  Service: Ophthalmology;  Laterality: Left;    CEREBRAL ANEURYSM REPAIR      clip     SECTION      DENTAL SURGERY      EYE SURGERY Bilateral     cataract removal and lens implants    HIP ARTHROPLASTY Left 2023    Procedure: TOTAL HIP ARTHROPLASTY;  Surgeon: Juan Wan MD;   Location: [facility] OR [address] [place] [address];  Service: Orthopedics;  Laterality: Left;    TONSILLECTOMY         Time Tracking:     PT Received On: 05/29/23  PT Start Time: 0840     PT Stop Time: 0930  PT Total Time (min): 50 min     Billable Minutes: Evaluation 12, Gait Training 15, and Neuromuscular Re-education 23    Co-treatment performed due to patient's multiple deficits requiring two skilled therapists to appropriately and safely assess patient's strength and endurance while facilitating functional tasks in addition to accommodating for patient's activity tolerance.       05/29/2023

## 2023-05-29 NOTE — ASSESSMENT & PLAN NOTE
72F, fall 5.27.23, Left femoral neck fracture     5.28.23 - left total hip arthroplasty for femoral neck fracture    Plan  Antibiotics x 24 hr (IV), followed by 1 wk of PO abx  Eliquis x 4 wks for DVT prophylaxis  Weight-bearing as tolerated Left lower extremity  Posterior hip precautions Left lower extremity x6 weeks  Multimodal pain control  Hospitalist comanagement  Physical therapy  Calcium, vitamin-D, boost  Fragility fracture Clinic referral  Follow-up postop 2 weeks

## 2023-05-30 LAB
ANION GAP SERPL CALC-SCNC: 6 MMOL/L (ref 8–16)
ANISOCYTOSIS BLD QL SMEAR: SLIGHT
BASO STIPL BLD QL SMEAR: ABNORMAL
BASOPHILS # BLD AUTO: 0.03 K/UL (ref 0–0.2)
BASOPHILS NFR BLD: 0.3 % (ref 0–1.9)
BLD PROD TYP BPU: NORMAL
BLD PROD TYP BPU: NORMAL
BLOOD UNIT EXPIRATION DATE: NORMAL
BLOOD UNIT EXPIRATION DATE: NORMAL
BLOOD UNIT TYPE CODE: 6200
BLOOD UNIT TYPE CODE: 6200
BLOOD UNIT TYPE: NORMAL
BLOOD UNIT TYPE: NORMAL
BUN SERPL-MCNC: 15 MG/DL (ref 8–23)
CALCIUM SERPL-MCNC: 7.8 MG/DL (ref 8.7–10.5)
CHLORIDE SERPL-SCNC: 101 MMOL/L (ref 95–110)
CO2 SERPL-SCNC: 23 MMOL/L (ref 23–29)
CODING SYSTEM: NORMAL
CODING SYSTEM: NORMAL
CREAT SERPL-MCNC: 0.8 MG/DL (ref 0.5–1.4)
CROSSMATCH INTERPRETATION: NORMAL
CROSSMATCH INTERPRETATION: NORMAL
DIFFERENTIAL METHOD: ABNORMAL
DISPENSE STATUS: NORMAL
DISPENSE STATUS: NORMAL
EOSINOPHIL # BLD AUTO: 0.4 K/UL (ref 0–0.5)
EOSINOPHIL NFR BLD: 3.1 % (ref 0–8)
ERYTHROCYTE [DISTWIDTH] IN BLOOD BY AUTOMATED COUNT: 24.2 % (ref 11.5–14.5)
EST. GFR  (NO RACE VARIABLE): >60 ML/MIN/1.73 M^2
GLUCOSE SERPL-MCNC: 91 MG/DL (ref 70–110)
HCT VFR BLD AUTO: 26.8 % (ref 37–48.5)
HGB BLD-MCNC: 8.3 G/DL (ref 12–16)
IMM GRANULOCYTES # BLD AUTO: 0.11 K/UL (ref 0–0.04)
IMM GRANULOCYTES NFR BLD AUTO: 1 % (ref 0–0.5)
LYMPHOCYTES # BLD AUTO: 2 K/UL (ref 1–4.8)
LYMPHOCYTES NFR BLD: 17.4 % (ref 18–48)
MAGNESIUM SERPL-MCNC: 1.6 MG/DL (ref 1.6–2.6)
MCH RBC QN AUTO: 30.5 PG (ref 27–31)
MCHC RBC AUTO-ENTMCNC: 31 G/DL (ref 32–36)
MCV RBC AUTO: 99 FL (ref 82–98)
MONOCYTES # BLD AUTO: 2.1 K/UL (ref 0.3–1)
MONOCYTES NFR BLD: 18 % (ref 4–15)
NEUTROPHILS # BLD AUTO: 6.9 K/UL (ref 1.8–7.7)
NEUTROPHILS NFR BLD: 60.2 % (ref 38–73)
NRBC BLD-RTO: 0 /100 WBC
OVALOCYTES BLD QL SMEAR: ABNORMAL
PHOSPHATE SERPL-MCNC: 2.6 MG/DL (ref 2.7–4.5)
PLATELET # BLD AUTO: 223 K/UL (ref 150–450)
PLATELET BLD QL SMEAR: ABNORMAL
PMV BLD AUTO: 12 FL (ref 9.2–12.9)
POIKILOCYTOSIS BLD QL SMEAR: SLIGHT
POLYCHROMASIA BLD QL SMEAR: ABNORMAL
POTASSIUM SERPL-SCNC: 4 MMOL/L (ref 3.5–5.1)
RBC # BLD AUTO: 2.72 M/UL (ref 4–5.4)
SARS-COV-2 RNA RESP QL NAA+PROBE: NOT DETECTED
SCHISTOCYTES BLD QL SMEAR: ABNORMAL
SCHISTOCYTES BLD QL SMEAR: PRESENT
SODIUM SERPL-SCNC: 130 MMOL/L (ref 136–145)
TOXIC GRANULES BLD QL SMEAR: PRESENT
TRANS ERYTHROCYTES VOL PATIENT: NORMAL ML
TRANS ERYTHROCYTES VOL PATIENT: NORMAL ML
WBC # BLD AUTO: 11.37 K/UL (ref 3.9–12.7)

## 2023-05-30 PROCEDURE — 25000003 PHARM REV CODE 250: Performed by: STUDENT IN AN ORGANIZED HEALTH CARE EDUCATION/TRAINING PROGRAM

## 2023-05-30 PROCEDURE — 99233 PR SUBSEQUENT HOSPITAL CARE,LEVL III: ICD-10-PCS | Mod: ,,, | Performed by: HOSPITALIST

## 2023-05-30 PROCEDURE — 94799 UNLISTED PULMONARY SVC/PX: CPT

## 2023-05-30 PROCEDURE — 97110 THERAPEUTIC EXERCISES: CPT | Mod: CQ

## 2023-05-30 PROCEDURE — 94761 N-INVAS EAR/PLS OXIMETRY MLT: CPT

## 2023-05-30 PROCEDURE — 25000003 PHARM REV CODE 250: Performed by: ANESTHESIOLOGY

## 2023-05-30 PROCEDURE — 83735 ASSAY OF MAGNESIUM: CPT | Performed by: HOSPITALIST

## 2023-05-30 PROCEDURE — 80048 BASIC METABOLIC PNL TOTAL CA: CPT | Performed by: HOSPITALIST

## 2023-05-30 PROCEDURE — 84100 ASSAY OF PHOSPHORUS: CPT | Performed by: HOSPITALIST

## 2023-05-30 PROCEDURE — 99233 SBSQ HOSP IP/OBS HIGH 50: CPT | Mod: ,,, | Performed by: HOSPITALIST

## 2023-05-30 PROCEDURE — 36415 COLL VENOUS BLD VENIPUNCTURE: CPT | Performed by: HOSPITALIST

## 2023-05-30 PROCEDURE — 97530 THERAPEUTIC ACTIVITIES: CPT | Mod: CQ

## 2023-05-30 PROCEDURE — 85025 COMPLETE CBC W/AUTO DIFF WBC: CPT | Performed by: HOSPITALIST

## 2023-05-30 PROCEDURE — 97535 SELF CARE MNGMENT TRAINING: CPT

## 2023-05-30 PROCEDURE — 63600175 PHARM REV CODE 636 W HCPCS: Performed by: HOSPITALIST

## 2023-05-30 PROCEDURE — 25000003 PHARM REV CODE 250: Performed by: HOSPITALIST

## 2023-05-30 PROCEDURE — 11000001 HC ACUTE MED/SURG PRIVATE ROOM

## 2023-05-30 PROCEDURE — 87635 SARS-COV-2 COVID-19 AMP PRB: CPT | Performed by: HOSPITALIST

## 2023-05-30 PROCEDURE — 97530 THERAPEUTIC ACTIVITIES: CPT

## 2023-05-30 PROCEDURE — 97116 GAIT TRAINING THERAPY: CPT | Mod: CQ

## 2023-05-30 RX ORDER — CEFADROXIL 1000 MG/1
1 TABLET ORAL EVERY 12 HOURS
Qty: 12 TABLET | Refills: 0 | Status: ON HOLD
Start: 2023-05-30 | End: 2023-06-09 | Stop reason: HOSPADM

## 2023-05-30 RX ORDER — POLYETHYLENE GLYCOL 3350 17 G/17G
17 POWDER, FOR SOLUTION ORAL DAILY
Refills: 0
Start: 2023-05-31 | End: 2023-09-01

## 2023-05-30 RX ORDER — TALC
6 POWDER (GRAM) TOPICAL NIGHTLY PRN
Refills: 0
Start: 2023-05-30

## 2023-05-30 RX ORDER — CHOLECALCIFEROL (VITAMIN D3) 50 MCG
2000 TABLET ORAL DAILY
Start: 2023-05-31 | End: 2023-10-24

## 2023-05-30 RX ORDER — METHOCARBAMOL 500 MG/1
500 TABLET, FILM COATED ORAL 4 TIMES DAILY
Qty: 40 TABLET | Refills: 0
Start: 2023-05-30 | End: 2023-07-11 | Stop reason: ALTCHOICE

## 2023-05-30 RX ORDER — OXYCODONE HYDROCHLORIDE 5 MG/1
5 TABLET ORAL EVERY 4 HOURS PRN
Refills: 0
Start: 2023-05-30 | End: 2023-07-11 | Stop reason: ALTCHOICE

## 2023-05-30 RX ORDER — PREGABALIN 75 MG/1
75 CAPSULE ORAL NIGHTLY
Qty: 30 CAPSULE | Refills: 6
Start: 2023-05-30 | End: 2023-10-24

## 2023-05-30 RX ORDER — CELECOXIB 200 MG/1
200 CAPSULE ORAL DAILY
Start: 2023-05-31 | End: 2023-07-11 | Stop reason: ALTCHOICE

## 2023-05-30 RX ORDER — FERROUS SULFATE, DRIED 160(50) MG
2 TABLET, EXTENDED RELEASE ORAL DAILY
Qty: 60 TABLET | Refills: 11
Start: 2023-05-31 | End: 2023-06-20 | Stop reason: SDUPTHER

## 2023-05-30 RX ADMIN — CARVEDILOL 12.5 MG: 12.5 TABLET, FILM COATED ORAL at 08:05

## 2023-05-30 RX ADMIN — CEFADROXIL 1 G: 1000 TABLET ORAL at 10:05

## 2023-05-30 RX ADMIN — ALLOPURINOL 200 MG: 100 TABLET ORAL at 08:05

## 2023-05-30 RX ADMIN — CEFADROXIL 1 G: 1000 TABLET ORAL at 08:05

## 2023-05-30 RX ADMIN — CHOLECALCIFEROL TAB 25 MCG (1000 UNIT) 2000 UNITS: 25 TAB at 08:05

## 2023-05-30 RX ADMIN — DEXTROSE, SODIUM CHLORIDE, AND POTASSIUM CHLORIDE: 5; .45; .15 INJECTION INTRAVENOUS at 05:05

## 2023-05-30 RX ADMIN — METHOCARBAMOL 500 MG: 500 TABLET ORAL at 05:05

## 2023-05-30 RX ADMIN — MONTELUKAST 10 MG: 10 TABLET, FILM COATED ORAL at 09:05

## 2023-05-30 RX ADMIN — APIXABAN 2.5 MG: 2.5 TABLET, FILM COATED ORAL at 08:05

## 2023-05-30 RX ADMIN — MUPIROCIN 1 G: 20 OINTMENT TOPICAL at 08:05

## 2023-05-30 RX ADMIN — CARVEDILOL 12.5 MG: 12.5 TABLET, FILM COATED ORAL at 05:05

## 2023-05-30 RX ADMIN — PANTOPRAZOLE SODIUM 40 MG: 40 TABLET, DELAYED RELEASE ORAL at 08:05

## 2023-05-30 RX ADMIN — MUPIROCIN 1 G: 20 OINTMENT TOPICAL at 09:05

## 2023-05-30 RX ADMIN — APIXABAN 2.5 MG: 2.5 TABLET, FILM COATED ORAL at 09:05

## 2023-05-30 RX ADMIN — ACETAMINOPHEN 1000 MG: 500 TABLET ORAL at 05:05

## 2023-05-30 RX ADMIN — METHOCARBAMOL 500 MG: 500 TABLET ORAL at 02:05

## 2023-05-30 RX ADMIN — PREGABALIN 75 MG: 75 CAPSULE ORAL at 09:05

## 2023-05-30 RX ADMIN — SENNOSIDES AND DOCUSATE SODIUM 1 TABLET: 50; 8.6 TABLET ORAL at 08:05

## 2023-05-30 RX ADMIN — POLYETHYLENE GLYCOL 3350 17 G: 17 POWDER, FOR SOLUTION ORAL at 08:05

## 2023-05-30 RX ADMIN — OXYCODONE HYDROCHLORIDE 5 MG: 5 TABLET ORAL at 07:05

## 2023-05-30 RX ADMIN — Medication 2 TABLET: at 08:05

## 2023-05-30 RX ADMIN — ACETAMINOPHEN 1000 MG: 500 TABLET ORAL at 12:05

## 2023-05-30 RX ADMIN — METHOCARBAMOL 500 MG: 500 TABLET ORAL at 09:05

## 2023-05-30 RX ADMIN — MELATONIN TAB 3 MG 6 MG: 3 TAB at 09:05

## 2023-05-30 RX ADMIN — ONDANSETRON 4 MG: 2 INJECTION INTRAMUSCULAR; INTRAVENOUS at 07:05

## 2023-05-30 RX ADMIN — SENNOSIDES AND DOCUSATE SODIUM 1 TABLET: 50; 8.6 TABLET ORAL at 09:05

## 2023-05-30 RX ADMIN — ATORVASTATIN CALCIUM 80 MG: 40 TABLET, FILM COATED ORAL at 08:05

## 2023-05-30 RX ADMIN — METHOCARBAMOL 500 MG: 500 TABLET ORAL at 08:05

## 2023-05-30 RX ADMIN — CELECOXIB 200 MG: 200 CAPSULE ORAL at 08:05

## 2023-05-30 RX ADMIN — AMLODIPINE BESYLATE 10 MG: 5 TABLET ORAL at 08:05

## 2023-05-30 NOTE — PROGRESS NOTES
[facility]  Orthopedics  Progress Note    Patient Name: Jessica Floyd  MRN: [id number]  Admission Date: 5/26/2023  Hospital Length of Stay: 4 days  Attending Provider: Angela Nolan MD  Primary Care Provider: Effie Olmedo MD  Follow-up For: Procedure(s) (LRB):  TOTAL HIP ARTHROPLASTY (Left)    Post-Operative Day: 2 Days Post-Op  Subjective:     Principal Problem:Left displaced femoral neck fracture    Principal Orthopedic Problem: same sp IDA 5/28/23    Interval History: Reports is doing very well. NAEON.Vs wnl.  Hgb 8.3. Pain well controlled. No numbness or tingling. Did well with Pt yesterday. Rec SNF.     Review of patient's allergies indicates:  No Known Allergies    Current Facility-Administered Medications   Medication    0.9%  NaCl infusion (for blood administration)    acetaminophen tablet 1,000 mg    albuterol-ipratropium 2.5 mg-0.5 mg/3 mL nebulizer solution 3 mL    allopurinoL tablet 200 mg    amLODIPine tablet 10 mg    apixaban tablet 2.5 mg    atorvastatin tablet 80 mg    bisacodyL suppository 10 mg    calcium-vitamin D3 500 mg-5 mcg (200 unit) per tablet 2 tablet    carvediloL tablet 12.5 mg    cefadroxil tablet 1 g    ceFAZolin 2 g in dextrose 5 % in water (D5W) 5 % 50 mL IVPB (MB+)    celecoxib capsule 200 mg    dextrose 5 % and 0.45 % NaCl with KCl 20 mEq infusion    fentaNYL 50 mcg/mL injection 25 mcg    fluticasone propionate 50 mcg/actuation nasal spray 100 mcg    LIDOcaine (PF) 10 mg/ml (1%) injection 10 mg    magnesium oxide tablet 400 mg    melatonin tablet 6 mg    methocarbamoL tablet 500 mg    montelukast tablet 10 mg    morphine injection 2 mg    mupirocin 2 % ointment 1 g    mupirocin 2 % ointment 1 g    mupirocin 2 % ointment    ondansetron injection 4 mg    oxyCODONE immediate release tablet 5 mg    pantoprazole EC tablet 40 mg    polyethylene glycol packet 17 g    pregabalin capsule 75 mg    ROPIvacaine (PF) 2 mg/ml (0.2%) solution    senna-docusate 8.6-50 mg per tablet 1  tablet    sodium chloride 0.9% flush 10 mL    sodium chloride 0.9% flush 10 mL    sodium chloride 0.9% flush 3 mL    tranexamic acid (CYKLOKAPRON) 3,000 mg in sodium chloride 0.9% SolP 100 mL    vitamin D 1000 units tablet 2,000 Units     Facility-Administered Medications Ordered in Other Encounters   Medication    sodium hyaluronate (EUFLEXXA) 10 mg/mL(mw 2.4 -3.6 million) injection 20 mg     Objective:     Vital Signs (Most Recent):  Temp: 97.5 °F (36.4 °C) (05/30/23 0512)  Pulse: 71 (05/30/23 0512)  Resp: 20 (05/30/23 0512)  BP: 132/63 (05/30/23 0512)  SpO2: (!) 92 % (05/30/23 0512) Vital Signs (24h Range):  Temp:  [97.3 °F (36.3 °C)-98.1 °F (36.7 °C)] 97.5 °F (36.4 °C)  Pulse:  [70-80] 71  Resp:  [16-20] 20  SpO2:  [89 %-94 %] 92 %  BP: (105-139)/(56-69) 132/63     Weight: 55.8 kg (123 lb)     Body mass index is 21.79 kg/m².      Intake/Output Summary (Last 24 hours) at 5/30/2023 0648  Last data filed at 5/30/2023 0559  Gross per 24 hour   Intake --   Output 700 ml   Net -700 ml       Ortho/SPM Exam  A&Ox3     LLE  Posterior hip dressing cdi  NVI      Significant Labs: CBC:   Recent Labs   Lab 05/28/23  1456 05/29/23  0303 05/30/23  0440   WBC 14.00* 12.72* 11.37   HGB 10.2* 9.1* 8.3*   HCT 32.1* 29.1* 26.8*    216 223     CMP:   Recent Labs   Lab 05/29/23  0303   *   K 3.8   CL 98   CO2 25   *   BUN 11   CREATININE 0.8   CALCIUM 7.6*   ANIONGAP 8     All pertinent labs within the past 24 hours have been reviewed.    Significant Imaging: I have reviewed all pertinent imaging results/findings.    Assessment/Plan:     * Left displaced femoral neck fracture  72F, fall 5.27.23, Left femoral neck fracture     5.28.23 - left total hip arthroplasty for femoral neck fracture     Plan  Antibiotics x 24 hr (IV), followed by 1 wk of PO abx  Eliquis x 4 wks for DVT prophylaxis  Weight-bearing as tolerated Left lower extremity  Posterior hip precautions Left lower extremity x6 weeks  Multimodal pain  control  Hospitalist comanagement  Physical therapy  Calcium, vitamin-D, boost  Fragility fracture Clinic referral  Follow-up postop 2 weeks       Jocelyn Reyes PA-C  Orthopedics  [facility]

## 2023-05-30 NOTE — PLAN OF CARE
Skilled nursing facility ORDERS    05/31/2023  [facility]  [address] [facility] [address]  Dept: [phone]  Loc: [phone]     Admit to skilled nursing facility:      Diagnoses:  Active Hospital Problems    Diagnosis  POA    *Left displaced femoral neck fracture [S72.002A]  Yes    Acute blood loss anemia [D62]  No    Hypomagnesemia [E83.42]  Yes    Hypophosphatemia [E83.39]  Yes    Leukocytosis [D72.829]  Yes    Iron deficiency anemia secondary to blood loss (chronic) [D50.0]  Yes    Hypertension [I10]  Yes    Hypokalemia [E87.6]  Yes    Gastroesophageal reflux disease without esophagitis [K21.9]  Yes    Dyslipidemia [E78.5]  Yes    Coronary artery disease involving native coronary artery of native heart without angina pectoris [I25.10]  Yes     Outside [facility] 2014:  mLAD 20%  mCx 50%  mRCA 20%        Resolved Hospital Problems    Diagnosis Date Resolved POA    Preop cardiovascular exam [Z01.810] 05/28/2023 Not Applicable       Patient is homebound due to:  Left displaced femoral neck fracture    Allergies:Review of patient's allergies indicates:  No Known Allergies    Vitals:  Every shift    Diet: regular diet    Activities:   WBAT to LLE with posterior hip precautions x 6 weeks    Goals of Care Treatment Preferences:  Code Status: Full Code      Labs:  CMP + mag weekly    Nursing Precautions:  Fall and Pressure ulcer prevention    Consults:   PT to evaluate and treat- 5 times a week and OT to evaluate and treat- 5 times a week     Miscellaneous Care: Routine Skin for Bedridden Patients:  Apply moisture barrier cream to all        Keep bandages in place to LLE until orthopedics follow up. Do not get wet or immerse in water. Reinforce PRN if fraying.            Medications: Discontinue all previous medication orders, if any. See new list below.     Medication List        START taking these medications      apixaban 2.5 mg Tab  Commonly known as: ELIQUIS  Take  1 tablet (2.5 mg total) by mouth 2 (two) times daily. End date July 3, 2023     calcium-vitamin D3 500 mg-5 mcg (200 unit) per tablet  Commonly known as: OS-WHITNEY 500 + D3  Take 2 tablets by mouth once daily.  Start taking on: May 31, 2023     cefadroxil 1 gram tablet  Commonly known as: DURICEF  Take 1 tablet (1 g total) by mouth every 12 (twelve) hours. End date June 5, 2023 for 6 days     celecoxib 200 MG capsule  Commonly known as: CeleBREX  Take 1 capsule (200 mg total) by mouth once daily.  Start taking on: May 31, 2023     cholecalciferol (vitamin D3) 50 mcg (2,000 unit) Tab  Commonly known as: VITAMIN D3  Take 1 tablet (2,000 Units total) by mouth once daily.  Start taking on: May 31, 2023     melatonin 3 mg tablet  Commonly known as: MELATIN  Take 2 tablets (6 mg total) by mouth nightly as needed for Insomnia.     methocarbamoL 500 MG Tab  Commonly known as: ROBAXIN  Take 1 tablet (500 mg total) by mouth 4 (four) times daily.     oxyCODONE 5 MG immediate release tablet  Commonly known as: ROXICODONE  Take 1 tablet (5 mg total) by mouth every 4 (four) hours as needed (pain scale 7-10).     polyethylene glycol 17 gram Pwpk  Commonly known as: GLYCOLAX  Take 17 g by mouth once daily.  Start taking on: May 31, 2023     pregabalin 75 MG capsule  Commonly known as: LYRICA  Take 1 capsule (75 mg total) by mouth every evening.            CONTINUE taking these medications      allopurinoL 100 MG tablet  Commonly known as: ZYLOPRIM  Take 2 tablets (200 mg total) by mouth once daily.     amLODIPine 10 MG tablet  Commonly known as: NORVASC  Take 1 tablet (10 mg total) by mouth once daily.     atorvastatin 80 MG tablet  Commonly known as: LIPITOR  TAKE 1 TABLET BY MOUTH EVERY DAY     carvediloL 12.5 MG tablet  Commonly known as: COREG  Take 1 tablet (12.5 mg total) by mouth 2 (two) times daily with meals.     fluticasone propionate 50 mcg/actuation nasal spray  Commonly known as: FLONASE  SPRAY ONCE INTO EACH NOSTRIL ONCE  DAILY     montelukast 10 mg tablet  Commonly known as: SINGULAIR  Take 1 tablet (10 mg total) by mouth every evening.     pantoprazole 40 MG tablet  Commonly known as: PROTONIX  TAKE 1 TABLET BY MOUTH EVERY DAY            STOP taking these medications      colchicine 0.6 mg Cap  Commonly known as: MITIGARE     hydroCHLOROthiazide 25 MG tablet  Commonly known as: HYDRODIURIL     hydrocortisone 2.5 % ointment     ibuprofen 600 MG tablet  Commonly known as: ADVIL,MOTRIN     ketoconazole 2 % cream  Commonly known as: NIZORAL     LIDOcaine 5 %  Commonly known as: LIDODERM                Immunizations Administered as of 5/30/2023       Name Date Dose VIS Date Route Exp Date    COVID-19, MRNA, LN-S, PF (Pfizer) (Purple Cap) 8/23/2021  3:30 PM 0.3 mL 12/12/2020 Intramuscular 10/31/2021    Site: Left deltoid     Given By: Christi Castillo RN     : [employer]     Lot: [id number]     COVID-19, MRNA, LN-S, PF (Pfizer) (Purple Cap) 1/30/2021  9:56 AM 0.3 mL 12/12/2020 Intramuscular 5/31/2021    Site: Right deltoid     Given By: Medina Childers LPN     : [employer]     Lot: [id number]     COVID-19, MRNA, LN-S, PF (Pfizer) (Purple Cap) 1/9/2021  9:53 AM 0.3 mL 12/12/2020 Intramuscular 3/31/2021    Site: Right deltoid     Given By: Hina Toro LPN     : [employer]     Lot: [id number]             This patient has had both covid vaccinations    Some patients may experience side effects after vaccination.  These may include fever, headache, muscle or joint aches.  Most symptoms resolve with 24-48 hours and do not require urgent medical evaluation unless they persist for more than 72 hours or symptoms are concerning for an unrelated medical condition.          _________________________________  Angela Nolan MD  05/31/2023

## 2023-05-30 NOTE — PLAN OF CARE
Problem: Occupational Therapy  Goal: Occupational Therapy Goal  Description: Goals to be met by:  6/6/23    Patient will increase functional independence with ADLs by performing:    UE Dressing with [place].  LE Dressing with Modified [place].  Grooming while standing at sink with Modified [place].  Toileting from toilet with Modified [place] for hygiene and clothing management.   Toilet transfer to toilet with Modified [place].  Outcome: Ongoing, Progressing     Goals remain appropriate. Cont POC.

## 2023-05-30 NOTE — PLAN OF CARE
Problem: Infection  Goal: Absence of Infection Signs and Symptoms  Outcome: Ongoing, Progressing     Problem: Adult Inpatient Plan of Care  Goal: Patient-Specific Goal (Individualized)  Outcome: Ongoing, Progressing     Problem: Adjustment to Injury (Hip Fracture Medical Management)  Goal: Optimal Coping with Change in Health Status  Outcome: Ongoing, Progressing     Problem: Cognitive Decline Risk (Hip Fracture Medical Management)  Goal: Baseline Cognitive Function Maintained  Outcome: Ongoing, Progressing

## 2023-05-30 NOTE — PLAN OF CARE
05/30/23 0845   Post-Acute Status   Post-Acute Authorization Placement   Post-Acute Placement Status Pending payor review/awaiting authorization (if required)     Patient Accepted to [facility], pending auth. Pt expected to be medically ready to d/c on 05/31.    Fara Cohen LMSW  Case Management   [facility]   Ext. [phone]

## 2023-05-30 NOTE — SUBJECTIVE & OBJECTIVE
Interval history-she reports mild nausea thismorning and nursing to bring her some nausea meds he said shortly as she called about it to . She reports didn't eat too much yesterday as appetite still not too big but at home doesn't have a huge appetite. She said she likes to eat her own cooking as she did some training in [facility] in cooking and lived in [place] and so can do all styles of cooking as she grew up with Urdu family also so her specialty is cooking meatballs and discussed her recipes today as she enjoys doing this in her spare time. Expected hg downtick to 8 today post op and would expect leveling off from downtrends from surgery. Lytes stable and improved from admit. Has bed at [facility] for tomorrow if med ready. Working on BM for today            Review of patient's allergies indicates:  No Known Allergies    Current Facility-Administered Medications on File Prior to Encounter   Medication    sodium hyaluronate (EUFLEXXA) 10 mg/mL(mw 2.4 -3.6 million) injection 20 mg     Current Outpatient Medications on File Prior to Encounter   Medication Sig    carvediloL (COREG) 12.5 MG tablet Take 1 tablet (12.5 mg total) by mouth 2 (two) times daily with meals.    allopurinoL (ZYLOPRIM) 100 MG tablet Take 2 tablets (200 mg total) by mouth once daily.    amLODIPine (NORVASC) 10 MG tablet Take 1 tablet (10 mg total) by mouth once daily.    atorvastatin (LIPITOR) 80 MG tablet TAKE 1 TABLET BY MOUTH EVERY DAY    colchicine (MITIGARE) 0.6 mg Cap Take 1 capsule (0.6 mg total) by mouth once daily.    fluticasone propionate (FLONASE) 50 mcg/actuation nasal spray SPRAY ONCE INTO EACH NOSTRIL ONCE DAILY    hydroCHLOROthiazide (HYDRODIURIL) 25 MG tablet TAKE 1 TABLET BY MOUTH EVERY DAY    hydrocortisone 2.5 % ointment as needed.    ibuprofen (ADVIL,MOTRIN) 600 MG tablet Take 1 tablet (600 mg total) by mouth every 6 (six) hours as needed for Pain.    ketoconazole (NIZORAL) 2 % cream APPLY BETWEEN TOES TWICE DAILY X 2 WKS     LIDOcaine (LIDODERM) 5 % Place 1 patch onto the skin once daily. Remove & Discard patch within 12 hours or as directed by MD    montelukast (SINGULAIR) 10 mg tablet Take 1 tablet (10 mg total) by mouth every evening.    pantoprazole (PROTONIX) 40 MG tablet TAKE 1 TABLET BY MOUTH EVERY DAY     Family History       Problem Relation (Age of Onset)    Alcohol abuse Father    Aneurysm Mother    Gout Brother    Heart disease Brother    Hypertension Mother, Father    Kidney disease Brother    No Known Problems Daughter, Daughter, Daughter          Tobacco Use    Smoking status: Former     Packs/day: 0.50     Years: 20.00     Pack years: 10.00     Types: Cigarettes     Quit date: 1999     Years since quittin.4     Passive exposure: Past    Smokeless tobacco: Never   Substance and Sexual Activity    Alcohol use: Yes     Alcohol/week: 7.0 standard drinks     Types: 7 Glasses of wine per week    Drug use: No    Sexual activity: Yes     Partners: Male     Review of Systems   Constitutional:  Negative for activity change, appetite change, chills, diaphoresis, fatigue and fever.   HENT:  Negative for congestion, dental problem, drooling, ear discharge, ear pain, facial swelling, hearing loss, mouth sores, nosebleeds, postnasal drip, rhinorrhea, sinus pressure, sneezing, sore throat, tinnitus, trouble swallowing and voice change.    Eyes:  Negative for photophobia, pain, discharge, redness, itching and visual disturbance.   Respiratory:  Negative for apnea, cough, choking, chest tightness, shortness of breath, wheezing and stridor.    Cardiovascular:  Negative for chest pain, palpitations and leg swelling.   Gastrointestinal:  Negative for abdominal distention, abdominal pain, anal bleeding, blood in stool, constipation, diarrhea, nausea, rectal pain and vomiting.   Endocrine: Negative for cold intolerance, heat intolerance, polydipsia, polyphagia and polyuria.   Genitourinary:  Negative for decreased urine volume,  difficulty urinating, dyspareunia, dysuria, enuresis, flank pain, frequency, genital sores, hematuria, menstrual problem, pelvic pain, urgency, vaginal bleeding, vaginal discharge and vaginal pain.   Musculoskeletal:  Positive for arthralgias (chronic R shoulder pain and acute left hip pain), gait problem (baseline line unsteady gait) and myalgias (Left thigh and leg). Negative for back pain, joint swelling, neck pain and neck stiffness.   Skin:  Negative for color change, pallor, rash and wound.   Allergic/Immunologic: Negative for environmental allergies, food allergies and immunocompromised state.   Neurological:  Negative for dizziness, tremors, seizures, syncope, facial asymmetry, speech difficulty, weakness, light-headedness, numbness and headaches.   Hematological:  Negative for adenopathy. Does not bruise/bleed easily.   Psychiatric/Behavioral:  Negative for agitation, behavioral problems, confusion, decreased concentration, dysphoric mood, hallucinations, self-injury, sleep disturbance and suicidal ideas. The patient is not nervous/anxious and is not hyperactive.    Objective:     Vital Signs (Most Recent):  Temp: 98.3 °F (36.8 °C) (05/30/23 0700)  Pulse: 70 (05/30/23 0700)  Resp: 16 (05/30/23 0700)  BP: 122/62 (05/30/23 0700)  SpO2: 96 % (05/30/23 0700) Vital Signs (24h Range):  Temp:  [97.3 °F (36.3 °C)-98.3 °F (36.8 °C)] 98.3 °F (36.8 °C)  Pulse:  [70-80] 70  Resp:  [16-20] 16  SpO2:  [89 %-96 %] 96 %  BP: (105-132)/(56-68) 122/62     Weight: 55.8 kg (123 lb)  Body mass index is 21.79 kg/m².     Physical Exam  Constitutional:       General: She is not in acute distress.     Appearance: She is well-developed. She is not diaphoretic.      Comments: Sitting in bed   HENT:      Head: Normocephalic and atraumatic.      Right Ear: External ear normal.      Left Ear: External ear normal.      Nose: Nose normal.      Mouth/Throat:      Pharynx: No oropharyngeal exudate.   Eyes:      General: No scleral  icterus.     Conjunctiva/sclera: Conjunctivae normal.      Pupils: Pupils are equal, round, and reactive to light.   Neck:      Thyroid: No thyromegaly.      Vascular: No JVD.      Trachea: No tracheal deviation.   Cardiovascular:      Rate and Rhythm: Normal rate and regular rhythm.      Pulses:           Dorsalis pedis pulses are 2+ on the right side and 2+ on the left side.        Posterior tibial pulses are 2+ on the right side and 2+ on the left side.      Heart sounds: Normal heart sounds. No murmur heard.    No gallop.   Pulmonary:      Effort: Pulmonary effort is normal. No respiratory distress.      Breath sounds: Normal breath sounds. No wheezing or rales.   Chest:      Chest wall: No tenderness.   Abdominal:      General: Bowel sounds are normal. There is no distension.      Palpations: Abdomen is soft. There is no mass.      Tenderness: There is no abdominal tenderness. There is no guarding or rebound.   Genitourinary:     Vagina: No vaginal discharge.   Musculoskeletal:         General: Tenderness and deformity present.      Cervical back: Neck supple.      Comments: Bandages in place to LLE. No pain reported with mild palpation   Lymphadenopathy:      Cervical: No cervical adenopathy.   Skin:     General: Skin is warm and dry.      Coloration: Skin is not pale.      Findings: No erythema or rash.   Neurological:      Mental Status: She is alert and oriented to person, place, and time.      Cranial Nerves: No cranial nerve deficit.      Motor: No abnormal muscle tone.      Coordination: Coordination normal.      Deep Tendon Reflexes: Reflexes are normal and symmetric. Reflexes normal.   Psychiatric:         Behavior: Behavior normal.         Thought Content: Thought content normal.         Judgment: Judgment normal.            CRANIAL NERVES     CN III, IV, VI   Pupils are equal, round, and reactive to light.     Significant Labs: All pertinent labs within the past 24 hours have been reviewed.  Recent  Lab Results         05/30/23  0846   05/30/23  0440        Anion Gap   6       Aniso   Slight       Baso #   0.03       Basophilic Stippling   Occasional       Basophil %   0.3       BUN   15       Calcium   7.8       Chloride   101       CO2   23       Creatinine   0.8       Differential Method   Automated       eGFR   >60.0       Eos #   0.4       Eosinophil %   3.1       Fragmented Cells   Occasional       Glucose   91       Gran # (ANC)   6.9       Gran %   60.2       Hematocrit   26.8       Hemoglobin   8.3       Immature Grans (Abs)   0.11  Comment: Mild elevation in immature granulocytes is non specific and   can be seen in a variety of conditions including stress response,   acute inflammation, trauma and pregnancy. Correlation with other   laboratory and clinical findings is essential.         Immature Granulocytes   1.0       Lymph #   2.0       Lymph %   17.4       Magnesium   1.6       MCH   30.5       MCHC   31.0       MCV   99       Mono #   2.1       Mono %   18.0       MPV   12.0       nRBC   0       Ovalocytes   Occasional       Phosphorus   2.6       Platelet Estimate   Appears normal       Platelets   223       Poikilocytosis   Slight       Poly   Occasional       Potassium   4.0       RBC   2.72       RDW   24.2       SARS-CoV2 (COVID-19) Qualitative PCR Not Detected  Comment: This test utilizes a real-time reverse transcription  polymerase chain reaction procedure to amplify and   detect the SARS-CoV-2 and detect the SARS-CoV-2 N2 and E nucleic  acid targets. The analytical sensitivity (limit of detection) of   this assay is 250 copies/mL.    A Detected result implies that the patient is infected with the  SARS-CoV-2 virus and is presumed to be contagious.    A Not Detected result implies that the SARS-CoV-2 target nucleic  acids are not present above the limit of detection.  It does not  rule out the possibility of COVID-19 and should not be the sole  basis for treatment decisions. If COVID-19  is strongly suspected  based on clinical and epidemiological history, re-testing should  be considered.    This test is only for use under Food and Drug   Administration s Emergency Use Authorization (EUA).   Commercial reagents are provided by [employer].  Performance characteristics of the EUA have been   independently verified by [facility]   Department of Pathology and Laboratory Medicine.             Schistocytes   Present       Sodium   130       Toxic Granulation   Present       WBC   11.37               Significant Imaging: I have reviewed all pertinent imaging results/findings within the past 24 hours.

## 2023-05-30 NOTE — PLAN OF CARE
Problem: Infection  Goal: Absence of Infection Signs and Symptoms  Outcome: Ongoing, Progressing     Problem: Adult Inpatient Plan of Care  Goal: Plan of Care Review  Outcome: Ongoing, Progressing  Goal: Patient-Specific Goal (Individualized)  Outcome: Ongoing, Progressing  Goal: Absence of Hospital-Acquired Illness or Injury  Outcome: Ongoing, Progressing  Goal: Optimal Comfort and Wellbeing  Outcome: Ongoing, Progressing  Goal: Readiness for Transition of Care  Outcome: Ongoing, Progressing     Problem: Adjustment to Injury (Hip Fracture Medical Management)  Goal: Optimal Coping with Change in Health Status  Outcome: Ongoing, Progressing     Problem: Bleeding (Hip Fracture Medical Management)  Goal: Absence of Bleeding  Outcome: Ongoing, Progressing     Problem: Bowel Elimination Impaired (Hip Fracture Medical Management)  Goal: Effective Bowel Elimination  Outcome: Ongoing, Progressing     Problem: Cognitive Decline Risk (Hip Fracture Medical Management)  Goal: Baseline Cognitive Function Maintained  Outcome: Ongoing, Progressing     Problem: Embolism (Hip Fracture Medical Management)  Goal: Absence of Embolism  Outcome: Ongoing, Progressing     Problem: Fracture Stabilization and Management (Hip Fracture Medical Management)  Goal: Fracture Stability  Outcome: Ongoing, Progressing     Problem: Functional Ability Impaired (Hip Fracture Medical Management)  Goal: Optimal Functional Performance  Outcome: Ongoing, Progressing     Problem: Pain (Hip Fracture Medical Management)  Goal: Acceptable Pain Level  Outcome: Ongoing, Progressing     Problem: Urinary Elimination Impaired (Hip Fracture Medical Management)  Goal: Effective Urinary Elimination  Outcome: Ongoing, Progressing     Problem: Bleeding (Surgery Nonspecified)  Goal: Absence of Bleeding  Outcome: Ongoing, Progressing     Problem: Bowel Motility Impaired (Surgery Nonspecified)  Goal: Effective Bowel Elimination  Outcome: Ongoing, Progressing

## 2023-05-30 NOTE — PT/OT/SLP PROGRESS
Physical Therapy Treatment    Patient Name:  Jessica Floyd   MRN:  [id number]    Recommendations:     Discharge Recommendations: nursing facility, skilled  Discharge Equipment Recommendations: to be determined by next level of care  Barriers to discharge: Inaccessible home and Decreased caregiver support    Assessment:     Jessica Floyd is a 72 y.o. female admitted with a medical diagnosis of Left displaced femoral neck fracture.  She presents with the following impairments/functional limitations: decreased lower extremity function, weakness, impaired endurance, impaired functional mobility, gait instability, decreased ROM, orthopedic precautions, impaired self care skills, impaired balance, pain, decreased safety awareness. Pt was agreeable to participate in therapy and tolerated therapy well. She was able to ambulate in carrion way with no complaints of SOB or any LOB. Stated she needed to use restroom after returning to room, ambulated to bathroom with RW and notified SPTA when finsihed. Pt will continue to benefit from skilled PT in order to address impairments listed abaove and return to highest level of function.    Rehab Prognosis: Good; patient would benefit from acute skilled PT services to address these deficits and reach maximum level of function.    Recent Surgery: Procedure(s) (LRB):  TOTAL HIP ARTHROPLASTY (Left) 2 Days Post-Op    Plan:     During this hospitalization, patient to be seen 3 x/week to address the identified rehab impairments via gait training, therapeutic exercises and progress toward the following goals:    Plan of Care Expires:  06/29/23    Subjective     Chief Complaint: none   Patient/Family Comments/goals: I'm happy to work with you  Pain/Comfort:  Pain Rating 1: 0/10      Objective:     Communicated with RN prior to session.  Patient found HOB elevated with FCD and peripheral IV   upon SPTA entry to room.     General Precautions: Standard, fall  Orthopedic Precautions: LLE posterior  precautions, LLE weight bearing as tolerated  Braces: N/A  Respiratory Status: Room air     Functional Mobility:  Bed Mobility:     Rolling Right: stand by assistance  Scooting: minimum assistance  Supine to Sit: minimum assistance  Transfers:     Sit to Stand:  contact guard assistance with rolling walker  Toilet Transfer: CGA from standing to toilet. (S) from toilet to standing.  Balance: was able to stand at sink and wash hands after using bathroom.   Gait: ambulated 60'  and 10 ft x 2  (chair to bathroom)with RW, CGA, and rolling chair following. Returned to room in rolling chair. Ambulated in room from chair<>bathroom.       AM-PAC 6 CLICK MOBILITY  Turning over in bed (including adjusting bedclothes, sheets and blankets)?: 3  Sitting down on and standing up from a chair with arms (e.g., wheelchair, bedside commode, etc.): 3  Moving from lying on back to sitting on the side of the bed?: 3  Moving to and from a bed to a chair (including a wheelchair)?: 3  Need to walk in hospital room?: 3  Climbing 3-5 steps with a railing?: 2  Basic Mobility Total Score: 17       Treatment & Education:  Pt was provided with HEP, demonstrated lower extremity therapeutic exercises, and was able to recall 1/3 post hip precautions. Pt re-educated on posterior hip precautions.  Pt performed LAQ, AP, QS, GS x 10 each. Pt educated on role of PT, POC, and importance of OOB activity to promote healing.    Patient left up in chair with all lines intact and call button in reach..    GOALS:   Multidisciplinary Problems       Physical Therapy Goals          Problem: Physical Therapy    Goal Priority Disciplines Outcome Goal Variances Interventions   Physical Therapy Goal     PT, PT/OT Ongoing, Progressing     Description: Goals to be met by: 2023     Patient will increase functional independence with mobility by performin. Supine to sit with [place]  2. Sit to supine with [place]  3. Sit to stand transfer with  Supervision  4. Bed to chair transfer with Supervision using LRAD  5. Gait  x 100 feet with Supervision using LRAD.   6. Ascend/descend 3 stair with B Handrails Minimal Assistance using LRAD.                          Time Tracking:     PT Received On: 05/30/23  PT Start Time: 1208     PT Stop Time: 1253  PT Total Time (min): 45 min     Billable Minutes: Gait Training 17, Therapeutic Activity 18 minutes, and Therapeutic Exercise 10 minutes    Treatment Type: Treatment  PT/PTA: PTA     Number of PTA visits since last PT visit: 1 05/30/2023  I certify that I was present in the room directing the student in service delivery and guiding them using my skilled judgment. As the co-signing therapist I have reviewed the students documentation and am responsible for the treatment, assessment, and plan. Vito Oro PTA

## 2023-05-30 NOTE — ASSESSMENT & PLAN NOTE
-s/p ground level mechanical fall at home on 5/26 with immediate severe Left hip pain   -imaging showed acute mildly displaced left femoral neck fracture   -seen by orthopedic with plan for OR on 5/28 for IDA with WBAT with posterior hip precautions x 6 weeks post op.  -eliquis post op for 35 days until July 3rd  -oxycodone and multimodals post op for pain control   -likely will need SNF for rehabilitation and accepted to [facility]  -SCDs for preop DVT PPX   -postop incentive spirometer use   Ortho f/u in 2 weeks for bandage removal  -bowel regimen  -vit D 43 at goal

## 2023-05-30 NOTE — PROGRESS NOTES
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 Pt states that she was walking to bathroom and tripped over a folding table and fell to hardwood floor around 6 pm this evening. Patient landed on left hip with immediate severe left hip and leg pain. The fall witnessed by her  who called EMS as patient was unable to get up or bear any weight on left side. Patient denies hitting her head, LOC, bleeding on site, chest pain, palpitation, dizziness, sob, focal weakness or numbness associated with fall.  Pt given 55 mcg fentanyl IM by EMS prior to arrival. Imaging showed acute mildly displaced left femoral neck fracture. Patient seen by orthopedic with plan for surgical fixation.      ED course: afebrile and vitals stable. Imaging showed acute mildly displaced left femoral neck fracture. EKG NSR with Q waves in septal leads VI-VII, TWI V4-6 (old). Labs significant for WBC 14, K 2.4, Mg 1.4 and phos 2.2. Patient received morphine, zofran, toradol, 40 meq oral KCL, magnesium sulfate and 1L NS bolus in ED. During my interview patient is awake and her  is present at bedside. She reports severe muscle spasm and left hip pain with minimal movement. Patient states at baseline she cane or walker sometime to avoid falls. She goes to physical therapy for degenerative R shoulder rotator cuff tear with improving strength and range of motion. She  walks around the house with her 5 year old granddaughter without chest pain or dyspnea. She denies fever, chills, cough, N/V/D/abdominal pain, dysuria or hematuria. She denies taking blood thinner. She does not smoke, but drinks couple of glasses of wine with dinner.       Overview/Hospital Course:  No notes on file    Interval history-she reports mild nausea thismorning and nursing to bring her some nausea meds he said shortly as she called about it to . She reports didn't eat too much yesterday as appetite still not too big but at home doesn't have a huge appetite. She said she likes to eat her own cooking as she did some training in [facility] in cooking and lived in [place] and so can do all styles of cooking as she grew up with [place] family also so her specialty is cooking meatballs and discussed her recipes today as she enjoys doing this in her spare time. Expected hg downtick to 8 today post op and would expect leveling off from downtrends from surgery. Lytes stable and improved from admit. Has bed at [facility] for tomorrow if med ready. Working on BM for today            Review of patient's allergies indicates:  No Known Allergies    Current Facility-Administered Medications on File Prior to Encounter   Medication    sodium hyaluronate (EUFLEXXA) 10 mg/mL(mw 2.4 -3.6 million) injection 20 mg     Current Outpatient Medications on File Prior to Encounter   Medication Sig    carvediloL (COREG) 12.5 MG tablet Take 1 tablet (12.5 mg total) by mouth 2 (two) times daily with meals.    allopurinoL (ZYLOPRIM) 100 MG tablet Take 2 tablets (200 mg total) by mouth once daily.    amLODIPine (NORVASC) 10 MG tablet Take 1 tablet (10 mg total) by mouth once daily.    atorvastatin (LIPITOR) 80 MG tablet TAKE 1 TABLET BY MOUTH EVERY DAY    colchicine (MITIGARE) 0.6 mg Cap Take 1 capsule (0.6 mg total) by mouth once daily.    fluticasone propionate (FLONASE) 50 mcg/actuation nasal spray SPRAY ONCE INTO EACH NOSTRIL ONCE  DAILY    hydroCHLOROthiazide (HYDRODIURIL) 25 MG tablet TAKE 1 TABLET BY MOUTH EVERY DAY    hydrocortisone 2.5 % ointment as needed.    ibuprofen (ADVIL,MOTRIN) 600 MG tablet Take 1 tablet (600 mg total) by mouth every 6 (six) hours as needed for Pain.    ketoconazole (NIZORAL) 2 % cream APPLY BETWEEN TOES TWICE DAILY X 2 WKS    LIDOcaine (LIDODERM) 5 % Place 1 patch onto the skin once daily. Remove & Discard patch within 12 hours or as directed by MD Aram buenrostro (SINGULAIR) 10 mg tablet Take 1 tablet (10 mg total) by mouth every evening.    pantoprazole (PROTONIX) 40 MG tablet TAKE 1 TABLET BY MOUTH EVERY DAY     Family History       Problem Relation (Age of Onset)    Alcohol abuse Father    Aneurysm Mother    Gout Brother    Heart disease Brother    Hypertension Mother, Father    Kidney disease Brother    No Known Problems Daughter, Daughter, Daughter          Tobacco Use    Smoking status: Former     Packs/day: 0.50     Years: 20.00     Pack years: 10.00     Types: Cigarettes     Quit date: 1999     Years since quittin.4     Passive exposure: Past    Smokeless tobacco: Never   Substance and Sexual Activity    Alcohol use: Yes     Alcohol/week: 7.0 standard drinks     Types: 7 Glasses of wine per week    Drug use: No    Sexual activity: Yes     Partners: Male     Review of Systems   Constitutional:  Negative for activity change, appetite change, chills, diaphoresis, fatigue and fever.   HENT:  Negative for congestion, dental problem, drooling, ear discharge, ear pain, facial swelling, hearing loss, mouth sores, nosebleeds, postnasal drip, rhinorrhea, sinus pressure, sneezing, sore throat, tinnitus, trouble swallowing and voice change.    Eyes:  Negative for photophobia, pain, discharge, redness, itching and visual disturbance.   Respiratory:  Negative for apnea, cough, choking, chest tightness, shortness of breath, wheezing and stridor.    Cardiovascular:  Negative for chest pain,  palpitations and leg swelling.   Gastrointestinal:  Negative for abdominal distention, abdominal pain, anal bleeding, blood in stool, constipation, diarrhea, nausea, rectal pain and vomiting.   Endocrine: Negative for cold intolerance, heat intolerance, polydipsia, polyphagia and polyuria.   Genitourinary:  Negative for decreased urine volume, difficulty urinating, dyspareunia, dysuria, enuresis, flank pain, frequency, genital sores, hematuria, menstrual problem, pelvic pain, urgency, vaginal bleeding, vaginal discharge and vaginal pain.   Musculoskeletal:  Positive for arthralgias (chronic R shoulder pain and acute left hip pain), gait problem (baseline line unsteady gait) and myalgias (Left thigh and leg). Negative for back pain, joint swelling, neck pain and neck stiffness.   Skin:  Negative for color change, pallor, rash and wound.   Allergic/Immunologic: Negative for environmental allergies, food allergies and immunocompromised state.   Neurological:  Negative for dizziness, tremors, seizures, syncope, facial asymmetry, speech difficulty, weakness, light-headedness, numbness and headaches.   Hematological:  Negative for adenopathy. Does not bruise/bleed easily.   Psychiatric/Behavioral:  Negative for agitation, behavioral problems, confusion, decreased concentration, dysphoric mood, hallucinations, self-injury, sleep disturbance and suicidal ideas. The patient is not nervous/anxious and is not hyperactive.    Objective:     Vital Signs (Most Recent):  Temp: 98.3 °F (36.8 °C) (05/30/23 0700)  Pulse: 70 (05/30/23 0700)  Resp: 16 (05/30/23 0700)  BP: 122/62 (05/30/23 0700)  SpO2: 96 % (05/30/23 0700) Vital Signs (24h Range):  Temp:  [97.3 °F (36.3 °C)-98.3 °F (36.8 °C)] 98.3 °F (36.8 °C)  Pulse:  [70-80] 70  Resp:  [16-20] 16  SpO2:  [89 %-96 %] 96 %  BP: (105-132)/(56-68) 122/62     Weight: 55.8 kg (123 lb)  Body mass index is 21.79 kg/m².     Physical Exam  Constitutional:       General: She is not in acute  distress.     Appearance: She is well-developed. She is not diaphoretic.      Comments: Sitting in bed   HENT:      Head: Normocephalic and atraumatic.      Right Ear: External ear normal.      Left Ear: External ear normal.      Nose: Nose normal.      Mouth/Throat:      Pharynx: No oropharyngeal exudate.   Eyes:      General: No scleral icterus.     Conjunctiva/sclera: Conjunctivae normal.      Pupils: Pupils are equal, round, and reactive to light.   Neck:      Thyroid: No thyromegaly.      Vascular: No JVD.      Trachea: No tracheal deviation.   Cardiovascular:      Rate and Rhythm: Normal rate and regular rhythm.      Pulses:           Dorsalis pedis pulses are 2+ on the right side and 2+ on the left side.        Posterior tibial pulses are 2+ on the right side and 2+ on the left side.      Heart sounds: Normal heart sounds. No murmur heard.    No gallop.   Pulmonary:      Effort: Pulmonary effort is normal. No respiratory distress.      Breath sounds: Normal breath sounds. No wheezing or rales.   Chest:      Chest wall: No tenderness.   Abdominal:      General: Bowel sounds are normal. There is no distension.      Palpations: Abdomen is soft. There is no mass.      Tenderness: There is no abdominal tenderness. There is no guarding or rebound.   Genitourinary:     Vagina: No vaginal discharge.   Musculoskeletal:         General: Tenderness and deformity present.      Cervical back: Neck supple.      Comments: Bandages in place to LLE. No pain reported with mild palpation   Lymphadenopathy:      Cervical: No cervical adenopathy.   Skin:     General: Skin is warm and dry.      Coloration: Skin is not pale.      Findings: No erythema or rash.   Neurological:      Mental Status: She is alert and oriented to person, place, and time.      Cranial Nerves: No cranial nerve deficit.      Motor: No abnormal muscle tone.      Coordination: Coordination normal.      Deep Tendon Reflexes: Reflexes are normal and symmetric.  Reflexes normal.   Psychiatric:         Behavior: Behavior normal.         Thought Content: Thought content normal.         Judgment: Judgment normal.            CRANIAL NERVES     CN III, IV, VI   Pupils are equal, round, and reactive to light.     Significant Labs: All pertinent labs within the past 24 hours have been reviewed.  Recent Lab Results         05/30/23  0846   05/30/23  0440        Anion Gap   6       Aniso   Slight       Baso #   0.03       Basophilic Stippling   Occasional       Basophil %   0.3       BUN   15       Calcium   7.8       Chloride   101       CO2   23       Creatinine   0.8       Differential Method   Automated       eGFR   >60.0       Eos #   0.4       Eosinophil %   3.1       Fragmented Cells   Occasional       Glucose   91       Gran # (ANC)   6.9       Gran %   60.2       Hematocrit   26.8       Hemoglobin   8.3       Immature Grans (Abs)   0.11  Comment: Mild elevation in immature granulocytes is non specific and   can be seen in a variety of conditions including stress response,   acute inflammation, trauma and pregnancy. Correlation with other   laboratory and clinical findings is essential.         Immature Granulocytes   1.0       Lymph #   2.0       Lymph %   17.4       Magnesium   1.6       MCH   30.5       MCHC   31.0       MCV   99       Mono #   2.1       Mono %   18.0       MPV   12.0       nRBC   0       Ovalocytes   Occasional       Phosphorus   2.6       Platelet Estimate   Appears normal       Platelets   223       Poikilocytosis   Slight       Poly   Occasional       Potassium   4.0       RBC   2.72       RDW   24.2       SARS-CoV2 (COVID-19) Qualitative PCR Not Detected  Comment: This test utilizes a real-time reverse transcription  polymerase chain reaction procedure to amplify and   detect the SARS-CoV-2 and detect the SARS-CoV-2 N2 and E nucleic  acid targets. The analytical sensitivity (limit of detection) of   this assay is 250 copies/mL.    A Detected result  implies that the patient is infected with the  SARS-CoV-2 virus and is presumed to be contagious.    A Not Detected result implies that the SARS-CoV-2 target nucleic  acids are not present above the limit of detection.  It does not  rule out the possibility of COVID-19 and should not be the sole  basis for treatment decisions. If COVID-19 is strongly suspected  based on clinical and epidemiological history, re-testing should  be considered.    This test is only for use under Food and Drug   Administration s Emergency Use Authorization (EUA).   Commercial reagents are provided by [employer].  Performance characteristics of the EUA have been   independently verified by [facility]   Department of Pathology and Laboratory Medicine.             Schistocytes   Present       Sodium   130       Toxic Granulation   Present       WBC   11.37               Significant Imaging: I have reviewed all pertinent imaging results/findings within the past 24 hours.      Assessment/Plan:      * Left displaced femoral neck fracture  -s/p ground level mechanical fall at home on 5/26 with immediate severe Left hip pain   -imaging showed acute mildly displaced left femoral neck fracture   -seen by orthopedic with plan for OR on 5/28 for IDA with WBAT with posterior hip precautions x 6 weeks post op.  -eliquis post op for 35 days until July 3rd  -oxycodone and multimodals post op for pain control   -likely will need SNF for rehabilitation and accepted to O-SNF  -SCDs for preop DVT PPX   -postop incentive spirometer use   Ortho f/u in 2 weeks for bandage removal  -bowel regimen  -vit D 43 at goal    Acute blood loss anemia  Expected post op with surgical losses with hg from 11--9--8 now and continue to trend. Would expect leveling off to occur shortly post op      Leukocytosis  -WBC 14  -likely stress induced in setting of femoral neck fracture  -afebrile and no signs of overt infection   UA negative on admit, procal  neg      Hypophosphatemia  Replace PRN      Hypomagnesemia  -replace prn      Iron deficiency anemia secondary to blood loss (chronic)  On IV iron infusions at home with recent severe nose bleed earlier this year as contributor to chronic losses  -hg 11 on admit      Hypertension  -chronic and controlled   -continue home amlodipine and carvedilol   -holding HCTZ in setting of hypokalemia and hypomagnesemia   -hydralazine prn       Hypokalemia  Replaced in ER and thought to be hctz contributor on admit so held, appetite spotty at times also per family, replaced 5/27 PM with improvements 5/28      Gastroesophageal reflux disease without esophagitis  -continue home PPI       Dyslipidemia  -continue home statin       Coronary artery disease involving native coronary artery of native heart without angina pectoris  -denies chest pain or sob with chronic t wave inversions on EKG slightly more prominent than previous but denies chest pain and trop neg on admit  -continue GDMT with beta blocker and statin         VTE Risk Mitigation (From admission, onward)         Ordered     apixaban tablet 2.5 mg  2 times daily         05/28/23 1143     IP VTE HIGH RISK PATIENT  Once         05/28/23 0632     Place sequential compression device  Until discontinued         05/28/23 0632                Discharge Planning   DEBBIE: 5/31/2023     Code Status: Full Code   Is the patient medically ready for discharge?: No    Reason for patient still in hospital (select all that apply): Patient trending condition  Discharge Plan A: Skilled Nursing Facility                  Angela Nolan MD  Department of Hospital Medicine   [facility] - Surgery

## 2023-05-30 NOTE — PT/OT/SLP PROGRESS
Occupational Therapy   Treatment    Name: Jessica Floyd  MRN: [id number]  Admitting Diagnosis:  Left displaced femoral neck fracture  2 Days Post-Op    Recommendations:     Discharge Recommendations: nursing facility, skilled  Discharge Equipment Recommendations:  to be determined by next level of care  Barriers to discharge:  Inaccessible home environment, Decreased caregiver support    Assessment:     Jessica Floyd is a 72 y.o. female with a medical diagnosis of Left displaced femoral neck fracture.  She presents with performance deficits affecting function are impaired endurance, impaired self care skills, impaired functional mobility, gait instability, impaired balance, visual deficits, decreased upper extremity function, decreased lower extremity function, orthopedic precautions. Pt agreeable to therapy. Pt tolerated ambulation in hallway with min cues for visual scanning 2* L visual (baseline) deficits. Pt able to perform self care tasks of basic toileting/hygiene and requiring assistance for LBD with posterior precautions. Pt participates well and is motivated to regain functional independence in mobility and self care.      Rehab Prognosis:  Good; patient would benefit from acute skilled OT services to address these deficits and reach maximum level of function.       Plan:     Patient to be seen 3 x/week to address the above listed problems via self-care/home management, therapeutic activities, therapeutic exercises, neuromuscular re-education  Plan of Care Expires: 06/28/23  Plan of Care Reviewed with: patient    Subjective     Chief Complaint: Being stuck in the hospital  Patient/Family Comments/goals: Get well  Pain/Comfort:  Pain Rating 1: 0/10  Pain Rating Post-Intervention 1: 0/10    Objective:     Communicated with: RN prior to session.  Patient found up in chair with peripheral IV upon OT entry to room.    General Precautions: Standard, fall    Orthopedic Precautions:LLE weight bearing as tolerated, LLE  posterior precautions  Braces: N/A  Respiratory Status: Room air     Occupational Performance:     Bed Mobility:    Not observed    Functional Mobility/Transfers:  Patient completed Sit <> Stand Transfer with stand by assistance  with  rolling walker   Patient completed Toilet Transfer Step Transfer technique with contact guard assistance with  rolling walker  Functional Mobility: CGA with RW    Activities of Daily Living:  Grooming: stand by assistance standing at sink  Upper Body Dressing: minimum assistance gown as robe  Toileting: stand by assistance at toilet      [facility] 6 Click ADL: 18    Treatment & Education:  Pt edu re OT role, POC and safety.  Pt edu re posterior hip precautions.  Pt and daughter had questions re therapy at SNF; questions were addressed to their satisfaction.    Patient left up in chair with all lines intact, call button in reach, and daughter present    GOALS:   Multidisciplinary Problems       Occupational Therapy Goals          Problem: Occupational Therapy    Goal Priority Disciplines Outcome Interventions   Occupational Therapy Goal     OT, PT/OT Ongoing, Progressing    Description: Goals to be met by:  6/6/23    Patient will increase functional independence with ADLs by performing:    UE Dressing with Benson.  LE Dressing with Modified [place].  Grooming while standing at sink with Modified [place].  Toileting from toilet with Modified [place] for hygiene and clothing management.   Toilet transfer to toilet with Modified [place].                       Time Tracking:     OT Date of Treatment: 05/30/23  OT Start Time: 1427  OT Stop Time: 1507  OT Total Time (min): 40 min    Billable Minutes:Self Care/Home Management 14 minutes  Therapeutic Activity 26 minutes    OT/OCTAVIO: OT       LUL Tran  5/30/2023  Pager: [phone]

## 2023-05-30 NOTE — ASSESSMENT & PLAN NOTE
Expected post op with surgical losses with hg from 11--9--8 now and continue to trend. Would expect leveling off to occur shortly post op

## 2023-05-31 ENCOUNTER — HOSPITAL ENCOUNTER (INPATIENT)
Facility: HOSPITAL | Age: 73
LOS: 12 days | Discharge: HOME HEALTH | DRG: 536 | End: 2023-06-12
Attending: HOSPITALIST | Admitting: HOSPITALIST
Payer: MEDICARE

## 2023-05-31 VITALS
HEART RATE: 77 BPM | BODY MASS INDEX: 21.79 KG/M2 | OXYGEN SATURATION: 94 % | DIASTOLIC BLOOD PRESSURE: 60 MMHG | SYSTOLIC BLOOD PRESSURE: 101 MMHG | WEIGHT: 123 LBS | TEMPERATURE: 98 F | RESPIRATION RATE: 16 BRPM

## 2023-05-31 DIAGNOSIS — S72.002A CLOSED FRACTURE OF NECK OF LEFT FEMUR: ICD-10-CM

## 2023-05-31 DIAGNOSIS — S72.002A LEFT DISPLACED FEMORAL NECK FRACTURE: Primary | ICD-10-CM

## 2023-05-31 DIAGNOSIS — I10 ESSENTIAL HYPERTENSION: ICD-10-CM

## 2023-05-31 LAB
ANION GAP SERPL CALC-SCNC: 10 MMOL/L (ref 8–16)
BASOPHILS # BLD AUTO: 0.05 K/UL (ref 0–0.2)
BASOPHILS NFR BLD: 0.5 % (ref 0–1.9)
BUN SERPL-MCNC: 13 MG/DL (ref 8–23)
CALCIUM SERPL-MCNC: 8.4 MG/DL (ref 8.7–10.5)
CHLORIDE SERPL-SCNC: 99 MMOL/L (ref 95–110)
CO2 SERPL-SCNC: 22 MMOL/L (ref 23–29)
CREAT SERPL-MCNC: 0.7 MG/DL (ref 0.5–1.4)
DIFFERENTIAL METHOD: ABNORMAL
EOSINOPHIL # BLD AUTO: 0.4 K/UL (ref 0–0.5)
EOSINOPHIL NFR BLD: 4 % (ref 0–8)
ERYTHROCYTE [DISTWIDTH] IN BLOOD BY AUTOMATED COUNT: 23.9 % (ref 11.5–14.5)
EST. GFR  (NO RACE VARIABLE): >60 ML/MIN/1.73 M^2
GLUCOSE SERPL-MCNC: 90 MG/DL (ref 70–110)
HCT VFR BLD AUTO: 26.8 % (ref 37–48.5)
HGB BLD-MCNC: 8.5 G/DL (ref 12–16)
IMM GRANULOCYTES # BLD AUTO: 0.11 K/UL (ref 0–0.04)
IMM GRANULOCYTES NFR BLD AUTO: 1 % (ref 0–0.5)
LYMPHOCYTES # BLD AUTO: 1.6 K/UL (ref 1–4.8)
LYMPHOCYTES NFR BLD: 14.9 % (ref 18–48)
MAGNESIUM SERPL-MCNC: 1.5 MG/DL (ref 1.6–2.6)
MCH RBC QN AUTO: 30.9 PG (ref 27–31)
MCHC RBC AUTO-ENTMCNC: 31.7 G/DL (ref 32–36)
MCV RBC AUTO: 98 FL (ref 82–98)
MONOCYTES # BLD AUTO: 1.7 K/UL (ref 0.3–1)
MONOCYTES NFR BLD: 15.7 % (ref 4–15)
NEUTROPHILS # BLD AUTO: 6.8 K/UL (ref 1.8–7.7)
NEUTROPHILS NFR BLD: 63.9 % (ref 38–73)
NRBC BLD-RTO: 0 /100 WBC
PHOSPHATE SERPL-MCNC: 2.8 MG/DL (ref 2.7–4.5)
PLATELET # BLD AUTO: 247 K/UL (ref 150–450)
PMV BLD AUTO: 11.2 FL (ref 9.2–12.9)
POTASSIUM SERPL-SCNC: 4.1 MMOL/L (ref 3.5–5.1)
RBC # BLD AUTO: 2.75 M/UL (ref 4–5.4)
SODIUM SERPL-SCNC: 131 MMOL/L (ref 136–145)
WBC # BLD AUTO: 10.68 K/UL (ref 3.9–12.7)

## 2023-05-31 PROCEDURE — 97110 THERAPEUTIC EXERCISES: CPT | Mod: CQ

## 2023-05-31 PROCEDURE — 25000003 PHARM REV CODE 250: Performed by: STUDENT IN AN ORGANIZED HEALTH CARE EDUCATION/TRAINING PROGRAM

## 2023-05-31 PROCEDURE — 83735 ASSAY OF MAGNESIUM: CPT | Performed by: HOSPITALIST

## 2023-05-31 PROCEDURE — 25000003 PHARM REV CODE 250: Performed by: HOSPITALIST

## 2023-05-31 PROCEDURE — 36415 COLL VENOUS BLD VENIPUNCTURE: CPT | Performed by: HOSPITALIST

## 2023-05-31 PROCEDURE — 11000004 HC SNF PRIVATE

## 2023-05-31 PROCEDURE — 25000003 PHARM REV CODE 250: Performed by: ANESTHESIOLOGY

## 2023-05-31 PROCEDURE — 84100 ASSAY OF PHOSPHORUS: CPT | Performed by: HOSPITALIST

## 2023-05-31 PROCEDURE — 97530 THERAPEUTIC ACTIVITIES: CPT

## 2023-05-31 PROCEDURE — 99239 HOSP IP/OBS DSCHRG MGMT >30: CPT | Mod: ,,, | Performed by: HOSPITALIST

## 2023-05-31 PROCEDURE — 97535 SELF CARE MNGMENT TRAINING: CPT

## 2023-05-31 PROCEDURE — 97116 GAIT TRAINING THERAPY: CPT | Mod: CQ

## 2023-05-31 PROCEDURE — 80048 BASIC METABOLIC PNL TOTAL CA: CPT | Performed by: HOSPITALIST

## 2023-05-31 PROCEDURE — 85025 COMPLETE CBC W/AUTO DIFF WBC: CPT | Performed by: HOSPITALIST

## 2023-05-31 PROCEDURE — 99239 PR HOSPITAL DISCHARGE DAY,>30 MIN: ICD-10-PCS | Mod: ,,, | Performed by: HOSPITALIST

## 2023-05-31 RX ORDER — ACETAMINOPHEN 325 MG/1
650 TABLET ORAL EVERY 6 HOURS PRN
Status: DISCONTINUED | OUTPATIENT
Start: 2023-05-31 | End: 2023-05-31

## 2023-05-31 RX ORDER — AMLODIPINE BESYLATE 10 MG/1
10 TABLET ORAL DAILY
Status: DISCONTINUED | OUTPATIENT
Start: 2023-06-01 | End: 2023-06-01

## 2023-05-31 RX ORDER — PREGABALIN 75 MG/1
75 CAPSULE ORAL NIGHTLY
Status: DISCONTINUED | OUTPATIENT
Start: 2023-05-31 | End: 2023-06-12 | Stop reason: HOSPADM

## 2023-05-31 RX ORDER — MONTELUKAST SODIUM 10 MG/1
10 TABLET ORAL NIGHTLY
Status: DISCONTINUED | OUTPATIENT
Start: 2023-05-31 | End: 2023-06-12 | Stop reason: HOSPADM

## 2023-05-31 RX ORDER — FERROUS SULFATE, DRIED 160(50) MG
2 TABLET, EXTENDED RELEASE ORAL DAILY
Status: DISCONTINUED | OUTPATIENT
Start: 2023-06-01 | End: 2023-06-12 | Stop reason: HOSPADM

## 2023-05-31 RX ORDER — METHOCARBAMOL 500 MG/1
500 TABLET, FILM COATED ORAL 4 TIMES DAILY
Status: DISCONTINUED | OUTPATIENT
Start: 2023-05-31 | End: 2023-06-12 | Stop reason: HOSPADM

## 2023-05-31 RX ORDER — LANOLIN ALCOHOL/MO/W.PET/CERES
400 CREAM (GRAM) TOPICAL ONCE
Status: COMPLETED | OUTPATIENT
Start: 2023-05-31 | End: 2023-05-31

## 2023-05-31 RX ORDER — CEFADROXIL 1000 MG/1
1 TABLET ORAL EVERY 12 HOURS
Status: COMPLETED | OUTPATIENT
Start: 2023-05-31 | End: 2023-06-05

## 2023-05-31 RX ORDER — ALLOPURINOL 100 MG/1
200 TABLET ORAL DAILY
Status: DISCONTINUED | OUTPATIENT
Start: 2023-06-01 | End: 2023-06-12 | Stop reason: HOSPADM

## 2023-05-31 RX ORDER — CARVEDILOL 12.5 MG/1
12.5 TABLET ORAL 2 TIMES DAILY WITH MEALS
Status: DISCONTINUED | OUTPATIENT
Start: 2023-05-31 | End: 2023-06-01

## 2023-05-31 RX ORDER — CELECOXIB 200 MG/1
200 CAPSULE ORAL DAILY
Status: DISCONTINUED | OUTPATIENT
Start: 2023-06-01 | End: 2023-06-12 | Stop reason: HOSPADM

## 2023-05-31 RX ORDER — POLYETHYLENE GLYCOL 3350 17 G/17G
17 POWDER, FOR SOLUTION ORAL DAILY
Status: DISCONTINUED | OUTPATIENT
Start: 2023-06-01 | End: 2023-06-12 | Stop reason: HOSPADM

## 2023-05-31 RX ORDER — ACETAMINOPHEN 325 MG/1
650 TABLET ORAL EVERY 6 HOURS PRN
Status: DISCONTINUED | OUTPATIENT
Start: 2023-05-31 | End: 2023-06-12 | Stop reason: HOSPADM

## 2023-05-31 RX ORDER — TALC
6 POWDER (GRAM) TOPICAL NIGHTLY PRN
Status: DISCONTINUED | OUTPATIENT
Start: 2023-05-31 | End: 2023-06-12 | Stop reason: HOSPADM

## 2023-05-31 RX ORDER — CHOLECALCIFEROL (VITAMIN D3) 25 MCG
2000 TABLET ORAL DAILY
Status: DISCONTINUED | OUTPATIENT
Start: 2023-06-01 | End: 2023-06-12 | Stop reason: HOSPADM

## 2023-05-31 RX ORDER — FLUTICASONE PROPIONATE 50 MCG
1 SPRAY, SUSPENSION (ML) NASAL DAILY
Status: DISCONTINUED | OUTPATIENT
Start: 2023-06-01 | End: 2023-06-12 | Stop reason: HOSPADM

## 2023-05-31 RX ORDER — CALCIUM CARBONATE 200(500)MG
500 TABLET,CHEWABLE ORAL 2 TIMES DAILY PRN
Status: DISCONTINUED | OUTPATIENT
Start: 2023-05-31 | End: 2023-06-12 | Stop reason: HOSPADM

## 2023-05-31 RX ORDER — ATORVASTATIN CALCIUM 40 MG/1
80 TABLET, FILM COATED ORAL DAILY
Status: DISCONTINUED | OUTPATIENT
Start: 2023-06-01 | End: 2023-06-12 | Stop reason: HOSPADM

## 2023-05-31 RX ORDER — OXYCODONE HYDROCHLORIDE 5 MG/1
5 TABLET ORAL EVERY 4 HOURS PRN
Status: DISCONTINUED | OUTPATIENT
Start: 2023-05-31 | End: 2023-06-01

## 2023-05-31 RX ORDER — PANTOPRAZOLE SODIUM 40 MG/1
40 TABLET, DELAYED RELEASE ORAL DAILY
Status: DISCONTINUED | OUTPATIENT
Start: 2023-06-01 | End: 2023-06-12 | Stop reason: HOSPADM

## 2023-05-31 RX ORDER — AMOXICILLIN 250 MG
1 CAPSULE ORAL 2 TIMES DAILY
Status: DISCONTINUED | OUTPATIENT
Start: 2023-05-31 | End: 2023-06-12 | Stop reason: HOSPADM

## 2023-05-31 RX ADMIN — ATORVASTATIN CALCIUM 80 MG: 40 TABLET, FILM COATED ORAL at 10:05

## 2023-05-31 RX ADMIN — CARVEDILOL 12.5 MG: 12.5 TABLET, FILM COATED ORAL at 10:05

## 2023-05-31 RX ADMIN — METHOCARBAMOL 500 MG: 500 TABLET ORAL at 12:05

## 2023-05-31 RX ADMIN — MUPIROCIN 1 G: 20 OINTMENT TOPICAL at 10:05

## 2023-05-31 RX ADMIN — METHOCARBAMOL 500 MG: 500 TABLET ORAL at 05:05

## 2023-05-31 RX ADMIN — AMLODIPINE BESYLATE 10 MG: 5 TABLET ORAL at 10:05

## 2023-05-31 RX ADMIN — APIXABAN 2.5 MG: 2.5 TABLET, FILM COATED ORAL at 10:05

## 2023-05-31 RX ADMIN — CALCIUM CARBONATE (ANTACID) CHEW TAB 500 MG 500 MG: 500 CHEW TAB at 10:05

## 2023-05-31 RX ADMIN — FLUTICASONE PROPIONATE 100 MCG: 50 SPRAY, METERED NASAL at 10:05

## 2023-05-31 RX ADMIN — Medication 400 MG: at 10:05

## 2023-05-31 RX ADMIN — Medication 2 TABLET: at 10:05

## 2023-05-31 RX ADMIN — PREGABALIN 75 MG: 75 CAPSULE ORAL at 10:05

## 2023-05-31 RX ADMIN — CEFADROXIL 1 G: 1000 TABLET ORAL at 10:05

## 2023-05-31 RX ADMIN — METHOCARBAMOL 500 MG: 500 TABLET ORAL at 10:05

## 2023-05-31 RX ADMIN — OXYCODONE HYDROCHLORIDE 5 MG: 5 TABLET ORAL at 10:05

## 2023-05-31 RX ADMIN — SENNOSIDES AND DOCUSATE SODIUM 1 TABLET: 50; 8.6 TABLET ORAL at 10:05

## 2023-05-31 RX ADMIN — CARVEDILOL 12.5 MG: 12.5 TABLET, FILM COATED ORAL at 05:05

## 2023-05-31 RX ADMIN — PANTOPRAZOLE SODIUM 40 MG: 40 TABLET, DELAYED RELEASE ORAL at 10:05

## 2023-05-31 RX ADMIN — ALLOPURINOL 200 MG: 100 TABLET ORAL at 10:05

## 2023-05-31 RX ADMIN — MONTELUKAST 10 MG: 10 TABLET, FILM COATED ORAL at 10:05

## 2023-05-31 RX ADMIN — CELECOXIB 200 MG: 200 CAPSULE ORAL at 10:05

## 2023-05-31 RX ADMIN — CHOLECALCIFEROL TAB 25 MCG (1000 UNIT) 2000 UNITS: 25 TAB at 10:05

## 2023-05-31 NOTE — PT/OT/SLP PROGRESS
Occupational Therapy   Treatment    Name: Jessica Floyd  MRN: [id number]  Admitting Diagnosis:  Left displaced femoral neck fracture  3 Days Post-Op    Recommendations:     Discharge Recommendations: nursing facility, skilled  Discharge Equipment Recommendations:  to be determined by next level of care  Barriers to discharge:  Decreased caregiver support, Inaccessible home environment    Assessment:     Jessica Floyd is a 72 y.o. female with a medical diagnosis of Left displaced femoral neck fracture.  She presents with the following deficits affecting function:  weakness, impaired endurance, impaired self care skills, impaired functional mobility, gait instability, impaired balance, visual deficits, decreased lower extremity function, pain, decreased safety awareness, orthopedic precautions. Pt agreeable to therapy and tolerated session well with good participation and motivation to progress. She was able to recall 3/3 posterior hip precautions with minimal cueing required to adhere to during STS t/f. OT provided education and demonstration on hip kit for lower body dressing post surgery. She would continue to benefit from skilled OT intervention to address established goals. Once medically stable, OT recommending SNF in order to maximize independence with functional activities, reduce caregiver burden, and facilitate safe discharge.     Rehab Prognosis:  Good; patient would benefit from acute skilled OT services to address these deficits and reach maximum level of function.       Plan:     Patient to be seen 3 x/week to address the above listed problems via self-care/home management, therapeutic exercises, therapeutic activities, neuromuscular re-education  Plan of Care Expires: 06/28/23  Plan of Care Reviewed with: patient    Subjective     Chief Complaint: none  Patient/Family Comments/goals: to increase (I)  Pain/Comfort:  Pain Rating 1: 0/10 (at rest)  Location - Side 1: Left  Location - Orientation 1:  generalized  Location 1: leg  Pain Addressed 1: Reposition, Distraction  Pain Rating Post-Intervention 1: 1/10    Objective:     Communicated with: RN prior to session.  Patient found up in chair with FCD upon OT entry to room.    General Precautions: Standard, fall    Orthopedic Precautions:LLE weight bearing as tolerated, LLE posterior precautions  Braces: N/A  Respiratory Status: Room air     Occupational Performance:     Bed Mobility:    Not assessed- pt sitting in chair and returned to chair end of session    Functional Mobility/Transfers:  Patient completed Sit <> Stand Transfer with stand by assistance  with  rolling walker   Patient completed Toilet Transfer Step Transfer technique with contact guard assistance with  rolling walker  Functional Mobility: Pt engaged in functional mobility to simulate household/community distances 10 ft x2 trials with CGA and RW in order to maximize functional endurance and standing balance required for engagement in occupations of choice   No LOB or SOB noted  Cues for obstacle navigation due to low vision (left visual field)    Activities of Daily Living:  Grooming: supervision to wash hands standing at sink with RW  Lower Body Dressing: minimum assistance to doff sock with reacher and don (R) sock with sock aid  Toileting: stand by assistance as pt completed pericare standing from toilet (BSC over toilet)      [facility] 6 Click ADL: 20    Treatment & Education:  -Pt educated to dress surgical site first and further dressing techniques s/p surgery  -Pt educated on hand placement for transfers  -Pt educated on RW placement for ADLs  -Pt educated on positioning of sx LE during performance of functional activities vs rest/sleep  -Pt educated on weightbearing and surgical precautions with pt verbalizing 3/3 correctly  -Pt educated to call for assistance and to transfer with hospital staff only  -Provided education regarding role of OT, POC, & discharge recommendations with pt  verbalizing understanding.  Pt had no further questions & when asked whether there were any concerns pt reported none.     Patient left up in chair with all lines intact, call button in reach, RN notified, and CM present    GOALS:   Multidisciplinary Problems       Occupational Therapy Goals          Problem: Occupational Therapy    Goal Priority Disciplines Outcome Interventions   Occupational Therapy Goal     OT, PT/OT Ongoing, Progressing    Description: Goals to be met by:  6/6/23    Patient will increase functional independence with ADLs by performing:    UE Dressing with Green.  LE Dressing with Modified [place].  Grooming while standing at sink with Modified [place].  Toileting from toilet with Modified [place] for hygiene and clothing management.   Toilet transfer to toilet with Modified [place].                       Time Tracking:     OT Date of Treatment: 05/31/23  OT Start Time: 1050  OT Stop Time: 1113  OT Total Time (min): 23 min    Billable Minutes:Self Care/Home Management 15  Therapeutic Activity 8    OT/OCTAVIO: OT          5/31/2023

## 2023-05-31 NOTE — PLAN OF CARE
CHW met with patient/family at bedside. Patient experience rounding completed and reviewed the following.     Do you know your discharge plan? Yes       If yes, what is the plan? SNF    Have you discussed your needs and preferences with your SW/CM? Yes     Assigned SW/CM notified of any patient/family needs or concerns.

## 2023-05-31 NOTE — PT/OT/SLP PROGRESS
Physical Therapy Treatment    Patient Name:  Jessica Floyd   MRN:  [id number]    Recommendations:     Discharge Recommendations: nursing facility, skilled  Discharge Equipment Recommendations: to be determined by next level of care  Barriers to discharge: Inaccessible home and Decreased caregiver support    Assessment:     Jessica Floyd is a 72 y.o. female admitted with a medical diagnosis of Left displaced femoral neck fracture.  She presents with the following impairments/functional limitations: weakness, impaired endurance, impaired self care skills, impaired functional mobility, gait instability, impaired balance, visual deficits, decreased lower extremity function, pain, decreased safety awareness, orthopedic precautions. Pt tolerated treatment well. Pt was upset upon SPTA entry to room stating call bell was not in reach and was being ignored by people in carrion. Was agreeable to participate in PT. Was able to increase distance ambulated. Demonstrates veering to right during ambulation which she says is due to vision less 2/2 cerebral aneurysm. Pt will continue to benefit from skilled PT to address listed impairments and return to highest level of function.     Rehab Prognosis: Good; patient would benefit from acute skilled PT services to address these deficits and reach maximum level of function.    Recent Surgery: Procedure(s) (LRB):  TOTAL HIP ARTHROPLASTY (Left) 3 Days Post-Op    Plan:     During this hospitalization, patient to be seen 3 x/week to address the identified rehab impairments via gait training, therapeutic exercises and progress toward the following goals:    Plan of Care Expires:  06/29/23    Subjective     Chief Complaint: Feel like I'm being ignored  Patient/Family Comments/goals: I will do what I can do get better  Pain/Comfort:  Location - Side 1: Left  Location - Orientation 1: generalized  Location 1: leg  Pain Addressed 1: Reposition, Distraction      Objective:     Communicated with RN prior  to session.  Patient found up in chair with FCD,  upon PT entry to room.     General Precautions: Standard, fall  Orthopedic Precautions: LLE weight bearing as tolerated, LLE posterior precautions  Braces: N/A  Respiratory Status: Room air     Functional Mobility:  Transfers:     Sit to Stand:  contact guard assistance with rolling walker  Gait: Ambulated 100' with RW and CGA for safety. Demonstrates slow aliza  and short step length with veering to right side. VC to step inside of walker.      AM-PAC 6 CLICK MOBILITY  Turning over in bed (including adjusting bedclothes, sheets and blankets)?: 3  Sitting down on and standing up from a chair with arms (e.g., wheelchair, bedside commode, etc.): 3  Moving from lying on back to sitting on the side of the bed?: 3  Moving to and from a bed to a chair (including a wheelchair)?: 3  Need to walk in hospital room?: 3  Climbing 3-5 steps with a railing?: 2  Basic Mobility Total Score: 17       Treatment & Education:  Sitting: SAQ, QS, GS, AP x 10 each  Educated on role of PT, POC, and importance of OOB activity to promote healing.    Patient left up in chair with all lines intact and call button in reach..    GOALS:   Multidisciplinary Problems       Physical Therapy Goals          Problem: Physical Therapy    Goal Priority Disciplines Outcome Goal Variances Interventions   Physical Therapy Goal     PT, PT/OT Ongoing, Progressing     Description: Goals to be met by: 2023     Patient will increase functional independence with mobility by performin. Supine to sit with [place]  2. Sit to supine with [place]  3. Sit to stand transfer with Supervision  4. Bed to chair transfer with Supervision using LRAD  5. Gait  x 100 feet with Supervision using LRAD.   6. Ascend/descend 3 stair with B Handrails Minimal Assistance using LRAD.                          Time Tracking:     PT Received On: 23  PT Start Time: 942     PT Stop Time: 1014  PT Total Time  (min): 32 min     Billable Minutes: Gait Training 20 minutes and Therapeutic Exercise 12 minutes    Treatment Type: Treatment  PT/PTA: PTA     Number of PTA visits since last PT visit: 2     05/31/2023

## 2023-05-31 NOTE — NURSING
Patient arrived to facility via [facility] by w/c.  Patient is a 72y female admitted for left displaced femoral neck fracture.  AAOx4.  No distress noted.  No complains of pain or discomfort.  Skin w/d to touch.  Left femoral  noted with gauze and tegaderm dressing cdi.  No drainage noted.  Edema noted to lower bilat extremity.  Redness to sacral.  Generalized bruising noted.  Vitals stable. Bp- 116/67, p-76, r-18, t-97.8, o2 sats on room air-95%.  Oriented patient to facility.  Reminded to use call light for any assist needed.  Voices understanding.  Bed low/ lock, side rails up x2, call bell present.  Safety maintained.

## 2023-05-31 NOTE — PLAN OF CARE
[facility]  Discharge Final Note    Primary Care Provider: Effie Olmedo MD    Expected Discharge Date: 5/31/2023    Final Discharge Note (most recent)       Final Note - 05/31/23 1440          Final Note    Assessment Type Final Discharge Note     Anticipated Discharge Disposition Skilled Nursing Facility   [facility] Resources/Appts/Education Provided Provided patient/caregiver with written discharge plan information;Appointments scheduled by Navigator/Coordinator                   Future Appointments   Date Time Provider Department Center   6/5/2023  8:30 AM NON-CHEMO [address] [place] [address] CHEMO [facility]   6/12/2023 11:00 AM Mckay Cosby, [facility]   7/10/2023 10:45 AM Mckay Cosby, [facility]   8/7/2023 10:30 AM Armani Cia PA-C [facility] [place]   8/16/2023 11:00 AM LAB, [facility] [place]   8/18/2023  1:30 PM Anh Martinez MD [facility] HEM ONC [facility]   8/22/2023 10:30 AM Jaun Naranjo MD [facility] [place]   3/5/2024  9:00 AM Michael Lal MD [facility] [id number]     Important Message from Medicare  Important Message from Medicare regarding Discharge Appeal Rights: Explained to patient/caregiver, Given to patient/caregiver, Signed/date by patient/caregiver     Date IMM was signed: 05/31/23  Time IMM was signed: 1315    Contact Info       [facility] [address]   Specialty: Orthopedics    [address]  [facility] [address]   Phone: [phone]       Next Steps: Follow up    Instructions: 2 weeks

## 2023-05-31 NOTE — PLAN OF CARE
Lindsay with [facility] stated they are ready for patient and to set up transportation for 1:30pm  Nurse please call report to [phone].    Patient, spouse, and nurse notified of above.

## 2023-05-31 NOTE — DISCHARGE SUMMARY
DISCHARGE SUMMARY  Hospital Medicine    Team: [facility] HOSP MED H    Patient Name: Jessica Floyd  YOB: 1950    Admit Date: 5/26/2023    Discharge Date: 05/31/2023    Discharge Attending Physician: Angela Nolan MD     Principal Diagnoses:  Active Hospital Problems    Diagnosis  POA    *Left displaced femoral neck fracture [S72.002A]  Yes    Acute blood loss anemia [D62]  No    Hypomagnesemia [E83.42]  Yes    Hypophosphatemia [E83.39]  Yes    Leukocytosis [D72.829]  Yes    Iron deficiency anemia secondary to blood loss (chronic) [D50.0]  Yes    Hypertension [I10]  Yes    Hypokalemia [E87.6]  Yes    Gastroesophageal reflux disease without esophagitis [K21.9]  Yes    Dyslipidemia [E78.5]  Yes    Coronary artery disease involving native coronary artery of native heart without angina pectoris [I25.10]  Yes     [facility]:  mLAD 20%  mCx 50%  mRCA 20%        Resolved Hospital Problems    Diagnosis Date Resolved POA    Preop cardiovascular exam [Z01.810] 05/28/2023 Not Applicable       Discharged Condition:  improved    Interval history- she reports feeling well today, nursing at bedside and giving her her morning meds. Continue to hold hctz as likely was a contributor to electrolyte abnormalities on admit, mag mildly low again today and replaced. Hg stable in 8's and pain controlled, she reports feeling like she never even had surgery. Wokring well with PT/OT and accepted her first choice SNF at [facility] for today with ortho f/u.    Temp:  [96.3 °F (35.7 °C)-98.2 °F (36.8 °C)]   Pulse:  [69-78]   Resp:  [16-18]   BP: (101-176)/(60-81)   SpO2:  [90 %-94 %]      Physical Exam  Constitutional:       General: She is not in acute distress.     Appearance: She is well-developed. She is not diaphoretic.      Comments: Sitting in chair  HENT:      Head: Normocephalic and atraumatic.      Right Ear: External ear normal.      Left Ear: External ear normal.      Nose: Nose normal.      Mouth/Throat:       Pharynx: No oropharyngeal exudate.   Eyes:      General: No scleral icterus.     Conjunctiva/sclera: Conjunctivae normal.      Pupils: Pupils are equal, round, and reactive to light.   Neck:      Thyroid: No thyromegaly.      Vascular: No JVD.      Trachea: No tracheal deviation.   Cardiovascular:      Rate and Rhythm: Normal rate and regular rhythm.      Pulses:           Dorsalis pedis pulses are 2+ on the right side and 2+ on the left side.        Posterior tibial pulses are 2+ on the right side and 2+ on the left side.      Heart sounds: Normal heart sounds. No murmur heard.    No gallop.   Pulmonary:      Effort: Pulmonary effort is normal. No respiratory distress.      Breath sounds: Normal breath sounds. No wheezing or rales.   Chest:      Chest wall: No tenderness.   Abdominal:      General: Bowel sounds are normal. There is no distension.      Palpations: Abdomen is soft. There is no mass.      Tenderness: There is no abdominal tenderness. There is no guarding or rebound.   Genitourinary:     Vagina: No vaginal discharge.   Musculoskeletal:         General: Tenderness and deformity present.      Cervical back: Neck supple.      Comments: Bandages in place to LLE. No pain reported with mild palpation   Lymphadenopathy:      Cervical: No cervical adenopathy.   Skin:     General: Skin is warm and dry.      Coloration: Skin is not pale.      Findings: No erythema or rash.   Neurological:      Mental Status: She is alert and oriented to person, place, and time.      Cranial Nerves: No cranial nerve deficit.      Motor: No abnormal muscle tone.      Coordination: Coordination normal.      Deep Tendon Reflexes: Reflexes are normal and symmetric. Reflexes normal.   Psychiatric:         Behavior: Behavior normal.         Thought Content: Thought content normal.         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 Pt states that she was walking to bathroom and tripped over a folding table and fell to hardwood floor around 6 pm this evening. Patient landed on left hip with immediate severe left hip and leg pain. The fall witnessed by her  who called EMS as patient was unable to get up or bear any weight on left side. Patient denies hitting her head, LOC, bleeding on site, chest pain, palpitation, dizziness, sob, focal weakness or numbness associated with fall.  Pt given 55 mcg fentanyl IM by EMS prior to arrival. Imaging showed acute mildly displaced left femoral neck fracture. Patient seen by orthopedic with plan for surgical fixation.       ED course: afebrile and vitals stable. Imaging showed acute mildly displaced left femoral neck fracture. EKG NSR with Q waves in septal leads VI-VII, TWI V4-6 (old). Labs significant for WBC 14, K 2.4, Mg 1.4 and phos 2.2. Patient received morphine, zofran, toradol, 40 meq oral KCL, magnesium sulfate and 1L NS bolus in ED. During my interview patient is awake and her  is present at bedside. She reports severe muscle spasm and left hip pain with minimal movement. Patient states at baseline she cane or walker sometime to avoid falls. She goes to physical therapy for degenerative R shoulder rotator cuff tear with improving strength and range of motion. She walks around the house with her 5 year old granddaughter without chest pain or dyspnea. She denies fever, chills, cough, N/V/D/abdominal pain, dysuria or hematuria. She denies taking blood thinner. She does not smoke, but drinks couple of glasses of wine with dinner.     Course of Principle Problem for Admission:    Left displaced femoral neck fracture  -s/p ground level mechanical fall at home on 5/26 with immediate severe Left hip  pain   -imaging showed acute mildly displaced left femoral neck fracture   -seen by orthopedic with plan for OR on 5/28 for IDA with WBAT with posterior hip precautions x 6 weeks post op.  -eliquis post op for 35 days until July 3rd  -oxycodone and multimodals post op for pain control   -lSNF for rehabilitation and accepted to O-SNF for restoration of ADLS  -postop incentive spirometer use   Ortho f/u in 2 weeks for bandage removal  -bowel regimen  -vit D 43 at goal     Acute blood loss anemia  Expected post op with surgical losses with hg from 11--9--8 now and continue to trend. Leveled off on 5/31 in the 8's with no signs of bleeding on bandages        Leukocytosis  -WBC 14 on admit  -likely stress induced in setting of femoral neck fracture  -afebrile and no signs of overt infection   UA negative on admit, procal neg        Hypophosphatemia  Replaced PRN        Hypomagnesemia  -replaced prn        Iron deficiency anemia secondary to blood loss (chronic)  On IV iron infusions at home with recent severe nose bleed earlier this year as contributor to chronic losses  -hg 11 on admit        Hypertension  -chronic and controlled   -continue home amlodipine and carvedilol   -holding HCTZ in setting of hypokalemia and hypomagnesemia  and continue to hold on dc  -hydralazine prn         Hypokalemia  Replaced in ER and thought to be hctz contributor on admit so held, appetite spotty at times also per family, replaced 5/27 PM with improvements 5/28        Gastroesophageal reflux disease without esophagitis  -continue home PPI         Dyslipidemia  -continue home statin         Coronary artery disease involving native coronary artery of native heart without angina pectoris  -denies chest pain or sob with chronic t wave inversions on EKG slightly more prominent than previous but denies chest pain and trop neg on admit  -continue GDMT with beta blocker and statin          Consults: ortho    Last CBC/BMP:    CBC/Anemia Labs:  Coags:    Recent Labs   Lab 05/29/23  0303 05/30/23  0440 05/31/23  0417   WBC 12.72* 11.37 10.68   HGB 9.1* 8.3* 8.5*   HCT 29.1* 26.8* 26.8*    223 247   MCV 98 99* 98   RDW 24.1* 24.2* 23.9*    Recent Labs   Lab 05/26/23 2155   INR 1.0        Chemistries:   Recent Labs   Lab 05/25/23  0915 05/26/23 2155 05/27/23  0803 05/29/23  0303 05/30/23 0440 05/31/23 0417   * 133*   < > 131* 130* 131*   K 3.0* 2.4*   < > 3.8 4.0 4.1   CL 95 95   < > 98 101 99   CO2 27 23   < > 25 23 22*   BUN 20 18   < > 11 15 13   CREATININE 1.2 0.9   < > 0.8 0.8 0.7   CALCIUM 8.2* 8.5*   < > 7.6* 7.8* 8.4*   PROT 6.6 6.9  --   --   --   --    BILITOT 0.6 0.7  --   --   --   --    ALKPHOS 53* 53*  --   --   --   --    ALT 25 34  --   --   --   --    AST 34 44*  --   --   --   --    MG  --  1.4*   < > 1.5* 1.6 1.5*   PHOS  --  2.2*   < > 2.5* 2.6* 2.8    < > = values in this interval not displayed.              Special Treatments/Procedures:   Procedure(s) (LRB):  TOTAL HIP ARTHROPLASTY (Left)     Disposition: Skilled Nursing Facility      Future Scheduled Appointments:  Future Appointments   Date Time Provider Department Center   6/5/2023  8:30 AM NON-CHEMO 01 [facility] CHEMO [facility]   6/12/2023 11:00 AM [facility] [place]   7/10/2023 10:45 AM [facility] [place]   8/7/2023 10:30 AM Armani Cai PA-C [facility] [place]   8/16/2023 11:00 AM LAB, [facility]   8/18/2023  1:30 PM Anh Martinez MD [facility] HEM ONC [facility]   8/22/2023 10:30 AM Juan Naranjo MD [facility] [id number] [place]   3/5/2024  9:00 AM Michael Lal MD [facility] [id number] [facility]           Discharge Medication List:          Medication List        START taking these medications      apixaban 2.5 mg Tab  Commonly known as: ELIQUIS  Take 1 tablet (2.5 mg total) by mouth 2 (two) times daily. End date July 3, 2023     calcium-vitamin D3 500 mg-5 mcg (200 unit) per  tablet  Commonly known as: OS-WHITNEY 500 + D3  Take 2 tablets by mouth once daily.     cefadroxil 1 gram tablet  Commonly known as: DURICEF  Take 1 tablet (1 g total) by mouth every 12 (twelve) hours. End date June 5, 2023 for 6 days     celecoxib 200 MG capsule  Commonly known as: CeleBREX  Take 1 capsule (200 mg total) by mouth once daily.     cholecalciferol (vitamin D3) 50 mcg (2,000 unit) Tab  Commonly known as: VITAMIN D3  Take 1 tablet (2,000 Units total) by mouth once daily.     melatonin 3 mg tablet  Commonly known as: MELATIN  Take 2 tablets (6 mg total) by mouth nightly as needed for Insomnia.     methocarbamoL 500 MG Tab  Commonly known as: ROBAXIN  Take 1 tablet (500 mg total) by mouth 4 (four) times daily.     oxyCODONE 5 MG immediate release tablet  Commonly known as: ROXICODONE  Take 1 tablet (5 mg total) by mouth every 4 (four) hours as needed (pain scale 7-10).     polyethylene glycol 17 gram Pwpk  Commonly known as: GLYCOLAX  Take 17 g by mouth once daily.     pregabalin 75 MG capsule  Commonly known as: LYRICA  Take 1 capsule (75 mg total) by mouth every evening.            CONTINUE taking these medications      allopurinoL 100 MG tablet  Commonly known as: ZYLOPRIM  Take 2 tablets (200 mg total) by mouth once daily.     amLODIPine 10 MG tablet  Commonly known as: NORVASC  Take 1 tablet (10 mg total) by mouth once daily.     atorvastatin 80 MG tablet  Commonly known as: LIPITOR  TAKE 1 TABLET BY MOUTH EVERY DAY     carvediloL 12.5 MG tablet  Commonly known as: COREG  Take 1 tablet (12.5 mg total) by mouth 2 (two) times daily with meals.     fluticasone propionate 50 mcg/actuation nasal spray  Commonly known as: FLONASE  SPRAY ONCE INTO EACH NOSTRIL ONCE DAILY     montelukast 10 mg tablet  Commonly known as: SINGULAIR  Take 1 tablet (10 mg total) by mouth every evening.     pantoprazole 40 MG tablet  Commonly known as: PROTONIX  TAKE 1 TABLET BY MOUTH EVERY DAY            STOP taking these  medications      colchicine 0.6 mg Cap  Commonly known as: MITIGARE     hydroCHLOROthiazide 25 MG tablet  Commonly known as: HYDRODIURIL     hydrocortisone 2.5 % ointment     ibuprofen 600 MG tablet  Commonly known as: ADVIL,MOTRIN     ketoconazole 2 % cream  Commonly known as: NIZORAL     LIDOcaine 5 %  Commonly known as: LIDODERM               Where to Get Your Medications        These medications were sent to [facility] - [facility] [address]  [address], [facility] [address]      Phone: [phone]   montelukast 10 mg tablet       Information about where to get these medications is not yet available    Ask your nurse or doctor about these medications  apixaban 2.5 mg Tab  calcium-vitamin D3 500 mg-5 mcg (200 unit) per tablet  cefadroxil 1 gram tablet  celecoxib 200 MG capsule  cholecalciferol (vitamin D3) 50 mcg (2,000 unit) Tab  melatonin 3 mg tablet  methocarbamoL 500 MG Tab  oxyCODONE 5 MG immediate release tablet  polyethylene glycol 17 gram Pwpk  pregabalin 75 MG capsule         Patient Instructions:  No discharge procedures on file.    At the time of discharge patient was told to take all medications as prescribed, to keep all followup appointments, and to call their primary care physician or return to the emergency room if they have any worsening or concerning symptoms.    I spent 35 minutes preparing the discharge      Signing Physician:  Angela Nolan MD

## 2023-05-31 NOTE — PLAN OF CARE
Problem: Infection  Goal: Absence of Infection Signs and Symptoms  Outcome: Met     Problem: Adult Inpatient Plan of Care  Goal: Plan of Care Review  Outcome: Met  Goal: Patient-Specific Goal (Individualized)  Outcome: Met  Goal: Absence of Hospital-Acquired Illness or Injury  Outcome: Met  Goal: Optimal Comfort and Wellbeing  Outcome: Met  Goal: Readiness for Transition of Care  Outcome: Met     Problem: Adjustment to Injury (Hip Fracture Medical Management)  Goal: Optimal Coping with Change in Health Status  Outcome: Met     Problem: Bleeding (Hip Fracture Medical Management)  Goal: Absence of Bleeding  Outcome: Met     Problem: Bowel Elimination Impaired (Hip Fracture Medical Management)  Goal: Effective Bowel Elimination  Outcome: Met     Problem: Cognitive Decline Risk (Hip Fracture Medical Management)  Goal: Baseline Cognitive Function Maintained  Outcome: Met     Problem: Embolism (Hip Fracture Medical Management)  Goal: Absence of Embolism  Outcome: Met     Problem: Fracture Stabilization and Management (Hip Fracture Medical Management)  Goal: Fracture Stability  Outcome: Met     Problem: Functional Ability Impaired (Hip Fracture Medical Management)  Goal: Optimal Functional Performance  Outcome: Met     Problem: Pain (Hip Fracture Medical Management)  Goal: Acceptable Pain Level  Outcome: Met     Problem: Urinary Elimination Impaired (Hip Fracture Medical Management)  Goal: Effective Urinary Elimination  Outcome: Met     Problem: Bleeding (Surgery Nonspecified)  Goal: Absence of Bleeding  Outcome: Met     Problem: Bowel Motility Impaired (Surgery Nonspecified)  Goal: Effective Bowel Elimination  Outcome: Met     Problem: Fluid and Electrolyte Imbalance (Surgery Nonspecified)  Goal: Fluid and Electrolyte Balance  Outcome: Met     Problem: Glycemic Control Impaired (Surgery Nonspecified)  Goal: Blood Glucose Level Within Targeted Range  Outcome: Met     Problem: Infection (Surgery Nonspecified)  Goal:  Absence of Infection Signs and Symptoms  Outcome: Met     Problem: Ongoing Anesthesia Effects (Surgery Nonspecified)  Goal: Anesthesia/Sedation Recovery  Outcome: Met     Problem: Pain (Surgery Nonspecified)  Goal: Acceptable Pain Control  Outcome: Met     Problem: Postoperative Nausea and Vomiting (Surgery Nonspecified)  Goal: Nausea and Vomiting Relief  Outcome: Met     Problem: Postoperative Urinary Retention (Surgery Nonspecified)  Goal: Effective Urinary Elimination  Outcome: Met     Problem: Respiratory Compromise (Surgery Nonspecified)  Goal: Effective Oxygenation and Ventilation  Outcome: Met     Problem: Device-Related Complication Risk (Anesthesia/Analgesia, Neuraxial)  Goal: Safe Infusion Delivery Completion  Outcome: Met     Problem: Infection (Anesthesia/Analgesia, Neuraxial)  Goal: Absence of Infection Signs and Symptoms  Outcome: Met     Problem: Nausea and Vomiting (Anesthesia/Analgesia, Neuraxial)  Goal: Nausea and Vomiting Relief  Outcome: Met     Problem: Pain (Anesthesia/Analgesia, Neuraxial)  Goal: Effective Pain Control  Outcome: Met     Problem: Respiratory Compromise (Anesthesia/Analgesia, Neuraxial)  Goal: Effective Oxygenation and Ventilation  Outcome: Met     Problem: Sensorimotor Impairment (Anesthesia/Analgesia, Neuraxial)  Goal: Baseline Motor Function  Outcome: Met     Problem: Urinary Retention (Anesthesia/Analgesia, Neuraxial)  Goal: Effective Urinary Elimination  Outcome: Met     Problem: Fall Injury Risk  Goal: Absence of Fall and Fall-Related Injury  Outcome: Met     Problem: Skin Injury Risk Increased  Goal: Skin Health and Integrity  Outcome: Met

## 2023-06-01 PROBLEM — E44.1 MALNUTRITION OF MILD DEGREE: Status: ACTIVE | Noted: 2023-06-01

## 2023-06-01 LAB
ANION GAP SERPL CALC-SCNC: 6 MMOL/L (ref 8–16)
BASOPHILS # BLD AUTO: 0.08 K/UL (ref 0–0.2)
BASOPHILS NFR BLD: 0.9 % (ref 0–1.9)
BUN SERPL-MCNC: 12 MG/DL (ref 8–23)
CALCIUM SERPL-MCNC: 8.4 MG/DL (ref 8.7–10.5)
CHLORIDE SERPL-SCNC: 104 MMOL/L (ref 95–110)
CO2 SERPL-SCNC: 25 MMOL/L (ref 23–29)
CREAT SERPL-MCNC: 0.7 MG/DL (ref 0.5–1.4)
DIFFERENTIAL METHOD: ABNORMAL
EOSINOPHIL # BLD AUTO: 0.4 K/UL (ref 0–0.5)
EOSINOPHIL NFR BLD: 3.8 % (ref 0–8)
ERYTHROCYTE [DISTWIDTH] IN BLOOD BY AUTOMATED COUNT: 23.9 % (ref 11.5–14.5)
EST. GFR  (NO RACE VARIABLE): >60 ML/MIN/1.73 M^2
GLUCOSE SERPL-MCNC: 74 MG/DL (ref 70–110)
HCT VFR BLD AUTO: 26.9 % (ref 37–48.5)
HGB BLD-MCNC: 8.3 G/DL (ref 12–16)
IMM GRANULOCYTES # BLD AUTO: 0.1 K/UL (ref 0–0.04)
IMM GRANULOCYTES NFR BLD AUTO: 1.1 % (ref 0–0.5)
LYMPHOCYTES # BLD AUTO: 1.9 K/UL (ref 1–4.8)
LYMPHOCYTES NFR BLD: 20.1 % (ref 18–48)
MAGNESIUM SERPL-MCNC: 1.8 MG/DL (ref 1.6–2.6)
MCH RBC QN AUTO: 30.6 PG (ref 27–31)
MCHC RBC AUTO-ENTMCNC: 30.9 G/DL (ref 32–36)
MCV RBC AUTO: 99 FL (ref 82–98)
MONOCYTES # BLD AUTO: 1.4 K/UL (ref 0.3–1)
MONOCYTES NFR BLD: 14.9 % (ref 4–15)
NEUTROPHILS # BLD AUTO: 5.6 K/UL (ref 1.8–7.7)
NEUTROPHILS NFR BLD: 59.2 % (ref 38–73)
NRBC BLD-RTO: 0 /100 WBC
PHOSPHATE SERPL-MCNC: 3.5 MG/DL (ref 2.7–4.5)
PLATELET # BLD AUTO: 292 K/UL (ref 150–450)
PMV BLD AUTO: 11.4 FL (ref 9.2–12.9)
POTASSIUM SERPL-SCNC: 3.9 MMOL/L (ref 3.5–5.1)
RBC # BLD AUTO: 2.71 M/UL (ref 4–5.4)
SODIUM SERPL-SCNC: 135 MMOL/L (ref 136–145)
WBC # BLD AUTO: 9.39 K/UL (ref 3.9–12.7)

## 2023-06-01 PROCEDURE — 11000004 HC SNF PRIVATE

## 2023-06-01 PROCEDURE — 97530 THERAPEUTIC ACTIVITIES: CPT

## 2023-06-01 PROCEDURE — 25000003 PHARM REV CODE 250: Performed by: NURSE PRACTITIONER

## 2023-06-01 PROCEDURE — 84100 ASSAY OF PHOSPHORUS: CPT | Performed by: HOSPITALIST

## 2023-06-01 PROCEDURE — 83735 ASSAY OF MAGNESIUM: CPT | Performed by: HOSPITALIST

## 2023-06-01 PROCEDURE — 25000242 PHARM REV CODE 250 ALT 637 W/ HCPCS: Performed by: HOSPITALIST

## 2023-06-01 PROCEDURE — 97535 SELF CARE MNGMENT TRAINING: CPT

## 2023-06-01 PROCEDURE — 80048 BASIC METABOLIC PNL TOTAL CA: CPT | Performed by: HOSPITALIST

## 2023-06-01 PROCEDURE — 97165 OT EVAL LOW COMPLEX 30 MIN: CPT

## 2023-06-01 PROCEDURE — 97163 PT EVAL HIGH COMPLEX 45 MIN: CPT

## 2023-06-01 PROCEDURE — 97116 GAIT TRAINING THERAPY: CPT

## 2023-06-01 PROCEDURE — 36415 COLL VENOUS BLD VENIPUNCTURE: CPT | Performed by: HOSPITALIST

## 2023-06-01 PROCEDURE — 85025 COMPLETE CBC W/AUTO DIFF WBC: CPT | Performed by: HOSPITALIST

## 2023-06-01 PROCEDURE — 25000003 PHARM REV CODE 250: Performed by: HOSPITALIST

## 2023-06-01 RX ORDER — CARVEDILOL 3.12 MG/1
6.25 TABLET ORAL 2 TIMES DAILY WITH MEALS
Status: DISCONTINUED | OUTPATIENT
Start: 2023-06-02 | End: 2023-06-12 | Stop reason: HOSPADM

## 2023-06-01 RX ORDER — HYDROCODONE BITARTRATE AND ACETAMINOPHEN 5; 325 MG/1; MG/1
1 TABLET ORAL EVERY 4 HOURS PRN
Status: DISCONTINUED | OUTPATIENT
Start: 2023-06-01 | End: 2023-06-12 | Stop reason: HOSPADM

## 2023-06-01 RX ORDER — ONDANSETRON 4 MG/1
4 TABLET, ORALLY DISINTEGRATING ORAL EVERY 6 HOURS PRN
Status: DISCONTINUED | OUTPATIENT
Start: 2023-06-01 | End: 2023-06-12 | Stop reason: HOSPADM

## 2023-06-01 RX ORDER — HYDROCODONE BITARTRATE AND ACETAMINOPHEN 10; 325 MG/1; MG/1
1 TABLET ORAL EVERY 4 HOURS PRN
Status: DISCONTINUED | OUTPATIENT
Start: 2023-06-01 | End: 2023-06-12 | Stop reason: HOSPADM

## 2023-06-01 RX ORDER — AMLODIPINE BESYLATE 5 MG/1
5 TABLET ORAL DAILY
Status: DISCONTINUED | OUTPATIENT
Start: 2023-06-02 | End: 2023-06-12 | Stop reason: HOSPADM

## 2023-06-01 RX ADMIN — HYDROCODONE BITARTRATE AND ACETAMINOPHEN 1 TABLET: 10; 325 TABLET ORAL at 02:06

## 2023-06-01 RX ADMIN — SENNOSIDES AND DOCUSATE SODIUM 1 TABLET: 50; 8.6 TABLET ORAL at 08:06

## 2023-06-01 RX ADMIN — METHOCARBAMOL 500 MG: 500 TABLET ORAL at 09:06

## 2023-06-01 RX ADMIN — CELECOXIB 200 MG: 200 CAPSULE ORAL at 09:06

## 2023-06-01 RX ADMIN — APIXABAN 2.5 MG: 2.5 TABLET, FILM COATED ORAL at 08:06

## 2023-06-01 RX ADMIN — APIXABAN 2.5 MG: 2.5 TABLET, FILM COATED ORAL at 09:06

## 2023-06-01 RX ADMIN — ATORVASTATIN CALCIUM 80 MG: 40 TABLET, FILM COATED ORAL at 09:06

## 2023-06-01 RX ADMIN — PANTOPRAZOLE SODIUM 40 MG: 40 TABLET, DELAYED RELEASE ORAL at 09:06

## 2023-06-01 RX ADMIN — ALLOPURINOL 200 MG: 100 TABLET ORAL at 09:06

## 2023-06-01 RX ADMIN — CEFADROXIL 1 G: 1000 TABLET ORAL at 09:06

## 2023-06-01 RX ADMIN — PREGABALIN 75 MG: 75 CAPSULE ORAL at 08:06

## 2023-06-01 RX ADMIN — POLYETHYLENE GLYCOL 3350 17 G: 17 POWDER, FOR SOLUTION ORAL at 09:06

## 2023-06-01 RX ADMIN — CARVEDILOL 12.5 MG: 12.5 TABLET, FILM COATED ORAL at 09:06

## 2023-06-01 RX ADMIN — Medication 2 TABLET: at 09:06

## 2023-06-01 RX ADMIN — AMLODIPINE BESYLATE 10 MG: 10 TABLET ORAL at 09:06

## 2023-06-01 RX ADMIN — METHOCARBAMOL 500 MG: 500 TABLET ORAL at 02:06

## 2023-06-01 RX ADMIN — SENNOSIDES AND DOCUSATE SODIUM 1 TABLET: 50; 8.6 TABLET ORAL at 09:06

## 2023-06-01 RX ADMIN — METHOCARBAMOL 500 MG: 500 TABLET ORAL at 08:06

## 2023-06-01 RX ADMIN — FLUTICASONE PROPIONATE 50 MCG: 50 SPRAY, METERED NASAL at 09:06

## 2023-06-01 RX ADMIN — MONTELUKAST 10 MG: 10 TABLET, FILM COATED ORAL at 08:06

## 2023-06-01 RX ADMIN — CEFADROXIL 1 G: 1000 TABLET ORAL at 11:06

## 2023-06-01 RX ADMIN — CHOLECALCIFEROL TAB 25 MCG (1000 UNIT) 2000 UNITS: 25 TAB at 09:06

## 2023-06-01 RX ADMIN — METHOCARBAMOL 500 MG: 500 TABLET ORAL at 06:06

## 2023-06-01 NOTE — PLAN OF CARE
Problem: Physical Therapy  Goal: Physical Therapy Goal  Description: Goals to be met by: 7/1/23     Patient will increase functional independence with mobility by performing:    . Supine to sit with Set-up [place]  . Sit to supine with Set-up [place]  . Rolling to Left and Right with Set-up Assistance.  . Sit to stand transfer with Supervision  . Bed to chair transfer with Supervision using Rolling Walker/ LRAD  . Gait  x 150 feet with Supervision using Rolling Walker/LRAD  . Wheelchair propulsion x150 feet with Contact Guard Assistance using bilateral uppper extremities  . Ascend/descend 12 stair with bilateral Handrails Stand-by Assistance using No Assistive Device.   . Ascend/Descend 4 inch curb step with Stand-by Assistance using Rolling Walker/ LRAD.    Outcome: Ongoing, Progressing

## 2023-06-01 NOTE — PLAN OF CARE
Problem: Occupational Therapy  Goal: Occupational Therapy Goal  Description: Goals to be met by: 6/20/23     Patient will increase functional independence with ADLs by performing:    UE Dressing with [place].  LE Dressing with [place].  Grooming while standing with [place].  Toileting from toilet with [place] for hygiene and clothing management.   Bathing from  sitting at sink with [place].  Toilet transfer to toilet with Modified [place].    Outcome: Ongoing, Progressing  Eval completed, POC established

## 2023-06-01 NOTE — CONSULTS
[facility] - Skilled Nursing  Adult Nutrition  Progress Note    SUMMARY   Recommendations  1. Continue regular diet. Double meats  2. Encourage adequate intake.   3. RD to monitor PO intake, lab, and plan of care.   4.  to assist in meal choices daily.   Goals: Pt to meet her nutritional needs >75% of EEN and EPN  Nutrition Goal Status: new    Assessment and Plan  Mild Malnutrition  Malnutrition Type:  Context: social/environmental circumstances   Level: Mild     Related to (etiology):   Inadequate oral intake and lack of appetite      Signs and Symptoms (as evidenced by):   Intake of  <75% of estimated protein needs  Weight Loss (Malnutrition): 6% weight loss in 6 months (not significant)    Malnutrition Characteristic Summary:  Muscle Mass (Malnutrition): moderate depletion     Plan  Collaboration with other providers  Nutrition education-high protein diet   General diet  Increased protein diet-double meats      Malnutrition Assessment  6/1  Malnutrition Context: social/environmental circumstances  Malnutrition Level: mild  Skin (Micronutrient): bruised  Eyes (Micronutrient): conjunctiva dull   Orbital Region (Subcutaneous Fat Loss): moderate depletion  Upper Arm Region (Subcutaneous Fat Loss): mild depletion   [place] Region (Muscle Loss): mild depletion  Clavicle Bone Region (Muscle Loss): mild depletion  Dorsal Hand (Muscle Loss): moderate depletion  Anterior Thigh Region (Muscle Loss): mild depletion     Reason for Assessment  Reason For Assessment: identified at risk by screening criteria  Diagnosis: other (see comments) (Left displaced femoral neck fracture)  Relevant Medical History: HTN, CAD, SAH 2/2 aneurism  General information: Met w patient at bed side with . Pt reports good appetite. She had 75% of lunch. She said she needs a lot of protein to get better and request for double portions of meat w meals. No c/o N/V nor difficulties of chewing/swallowing.     Per chart review patient has  "had a weight loss of 6% in 6 months which is not significant. Will continue to monitor patient's weight throughout stay. NFPE completed. Student dietitian offered patient ONS, patient denied. LBM noted     Interdisciplinary Rounds: attended    Nutrition/Diet History  Spiritual, Cultural Beliefs, Scientology Practices, Values that Affect Care: no    Anthropometrics  Temp: 98 °F (36.7 °C)  Height: 5' 4" (162.6 cm)  Height (inches): 64 in  Weight Method: Standard Scale  Weight: 62.9 kg (138 lb 10.7 oz)  Weight (lb): 138.67 lb  Ideal Body Weight (IBW), Female: 120 lb  % Ideal Body Weight, Female (lb): 115.56 %  BMI (Calculated): 23.8  BMI Grade: 18.5-24.9 - normal  Weight Loss: unintentional  Usual Body Weight (UBW), k.45 kg  Weight Change Amount: 5 lb 15.8 oz (4% weight loss in 1 month, 6% weight loss in 6 months)  % Usual Body Weight: 96.3  % Weight Change From Usual Weight: -3.9 %       Lab/Procedures/Meds  Pertinent Labs Reviewed: reviewed  Pertinent Labs Comments: Hgb, Hct, Ca  Pertinent Medications Reviewed: reviewed  Pertinent Medications Comments: Senna docusate, Polyethlene glycol, Statins, Ca - vit D3, Vit D    Estimated/Assessed Needs  Weight Used For Calorie Calculations: 62.9 kg (138 lb 10.7 oz)  Energy Calorie Requirements (kcal): 1119  Energy Need Method: [place]-St Jeor ((x 1.4(PAL)))  Protein Requirements: 76  - 95 (1.2 - 1.5 g/kg) (Older Adults)  Weight Used For Protein Calculations: 62.9 kg (138 lb 10.7 oz)  RDA Method (mL): 1119         Nutrition Prescription Ordered  Current Diet Order: Regular Diet  Nutrition Order Comments: PO 75%    Evaluation of Received Nutrient/Fluid Intake  % Intake of Estimated Energy Needs: 50 - 75 %  % Meal Intake: 50 - 75 %    Nutrition Risk  Level of Risk/Frequency of Follow-up: low     Monitor and Evaluation  Food and Nutrient Intake: energy intake, food and beverage intake  Food and Nutrient Adminstration: diet order  Knowledge/Beliefs/Attitudes: food and " nutrition knowledge/skill  Physical Activity and Function: nutrition-related ADLs and IADLs  Anthropometric Measurements: weight, weight change, body mass index  Biochemical Data, Medical Tests and Procedures: electrolyte and renal panel, gastrointestinal profile, glucose/endocrine profile  Nutrition-Focused Physical Findings: overall appearance     Nutrition Follow-Up  RD Follow-up?: Yes    Marisol Castro Dietetic Intern

## 2023-06-01 NOTE — NURSING
Pt admitted to Skilled Nursing for Fracture of unspecified part of ne*. Patient is [id number]. Receiving daily PT/OT for strengthening, balance, gait training and ambulation. Pt currently requiring  1,2,3,4: 1 person assistance,for transfers and ambulation. Patient also requiring 1 person assistance with dressing and set up for grooming and eating. Daily skilled nursing interventions include pain management, medication management, surgical wound management and ADL assistance. Currently pain is being managed by Pain medications prescribed. and rates pain PAIN 0-10: 7. Pt is on a Diet (All options): Regular diet, tolerating well. Care plan reviewed and updated. No complaints at this time, will continue to update care and monitor.

## 2023-06-01 NOTE — CLINICAL REVIEW
Clinical Pharmacy Chart Review Note      Admit Date: 5/31/2023   LOS: 1 day       Jessica Floyd is a 72 y.o. female admitted to SNF for PT/OT after hospitalization for left displaced femoral neck fracture.    Review of patient's allergies indicates:  No Known Allergies  Patient Active Problem List    Diagnosis Date Noted    Acute blood loss anemia 05/29/2023    Left displaced femoral neck fracture 05/27/2023    Hypomagnesemia 05/27/2023    Hypophosphatemia 05/27/2023    Leukocytosis 05/27/2023    Iron deficiency anemia secondary to blood loss (chronic) 04/17/2023    Traumatic complete tear of right rotator cuff 02/15/2023    Localized primary osteoarthritis of right shoulder region [M19.011 (ICD-10-CM)] 02/15/2023    Nuclear sclerotic cataract of left eye 11/07/2022    Subclinical hyperthyroidism 10/15/2018    Allergic rhinitis 10/15/2018    Hypertension     Hypokalemia 05/26/2017    Stage 3a chronic kidney disease 05/16/2017    Gastroesophageal reflux disease without esophagitis 03/20/2017    Coronary artery disease involving native coronary artery of native heart without angina pectoris 08/24/2016    SAH (subarachnoid hemorrhage) 08/24/2016    Dyslipidemia 08/24/2016    Bilateral carotid artery stenosis 08/24/2016       Scheduled Meds:    allopurinoL  200 mg Oral Daily    amLODIPine  10 mg Oral Daily    apixaban  2.5 mg Oral BID    atorvastatin  80 mg Oral Daily    calcium-vitamin D3  2 tablet Oral Daily    carvediloL  12.5 mg Oral BID WM    cefadroxil  1 g Oral Q12H    celecoxib  200 mg Oral Daily    fluticasone propionate  1 spray Each Nostril Daily    methocarbamoL  500 mg Oral QID    montelukast  10 mg Oral QHS    pantoprazole  40 mg Oral Daily    polyethylene glycol  17 g Oral Daily    pregabalin  75 mg Oral QHS    senna-docusate 8.6-50 mg  1 tablet Oral BID    vitamin D  2,000 Units Oral Daily     Continuous Infusions:   PRN Meds: acetaminophen, calcium carbonate, melatonin, oxyCODONE    OBJECTIVE:     Vital  Signs (Last 24H)  Temp:  [97.7 °F (36.5 °C)-98 °F (36.7 °C)]   Pulse:  [74-77]   Resp:  [16-18]   BP: (101-125)/(60-67)   SpO2:  [93 %-95 %]     Laboratory:  CBC:   Recent Labs   Lab 05/30/23 0440 05/31/23 0417 06/01/23 0457   WBC 11.37 10.68 9.39   RBC 2.72* 2.75* 2.71*   HGB 8.3* 8.5* 8.3*   HCT 26.8* 26.8* 26.9*    247 292   MCV 99* 98 99*   MCH 30.5 30.9 30.6   MCHC 31.0* 31.7* 30.9*     BMP:   Recent Labs   Lab 05/30/23 0440 05/31/23 0417 06/01/23 0457   GLU 91 90 74   * 131* 135*   K 4.0 4.1 3.9    99 104   CO2 23 22* 25   BUN 15 13 12   CREATININE 0.8 0.7 0.7   CALCIUM 7.8* 8.4* 8.4*   MG 1.6 1.5* 1.8     CMP:   Recent Labs   Lab 05/25/23  0915 05/26/23 2155 05/27/23  0803 05/30/23 0440 05/31/23 0417 06/01/23 0457   GLU 91 137*   < > 91 90 74   CALCIUM 8.2* 8.5*   < > 7.8* 8.4* 8.4*   ALBUMIN 3.7 3.9  --   --   --   --    PROT 6.6 6.9  --   --   --   --    * 133*   < > 130* 131* 135*   K 3.0* 2.4*   < > 4.0 4.1 3.9   CO2 27 23   < > 23 22* 25   CL 95 95   < > 101 99 104   BUN 20 18   < > 15 13 12   CREATININE 1.2 0.9   < > 0.8 0.7 0.7   ALKPHOS 53* 53*  --   --   --   --    ALT 25 34  --   --   --   --    AST 34 44*  --   --   --   --    BILITOT 0.6 0.7  --   --   --   --     < > = values in this interval not displayed.     LFTs:   Recent Labs   Lab 05/25/23  0915 05/26/23  2155   ALT 25 34   AST 34 44*   ALKPHOS 53* 53*   BILITOT 0.6 0.7   PROT 6.6 6.9   ALBUMIN 3.7 3.9     Others:   Recent Labs   Lab 05/26/23  2314   [id number]         ASSESSMENT/PLAN:     I have reviewed the medications in compliance with CMS Regulation F756 of the [place]. Based on information gathered, the following items may need to be addressed:    **According to PMH and home medication list, patient takes the following medications at home. These medications are not currently ordered at SNF:  Colchicine 0.6 mg daily  HCTZ 25 mg daily    Medications reviewed by PharmD, please re-consult if  needed.      Pamela Jernigan, Pharm. D.  Clinical Pharmacist  [facility]

## 2023-06-01 NOTE — PROGRESS NOTES
[facility]                                  Skilled Nursing Facility                   Progress Note     Admit Date: 5/31/2023  DEBBIE   Principal Problem:  Left displaced femoral neck fracture   HPI obtained from patient interview and chart review     Chief Complaint: Establish Care/ Initial Visit     HPI:    Jessica Floyd is a 72 year old female with PMHx of HTN, CKD stage 3, CAD, SAH 2/2 aneurysm s/p clipping, right shoulder DJD and rotator cuff tear, GERD who presents to SNF following hospitalization for left femoral neck fracture s/p left hip total arthroplasty on 05/28 with Dr. Wan.  Admission to SNF for secondary weakness and debility.    Patient originally presented to [facility] [place] [place] ED on 05/27 for evaluation of left hip and leg pain after a mechanical fall from standing height.  Per [facility] [place] [place], Pt states that she was walking to bathroom and tripped over a folding table and fell to hardwood floor. Patient landed on left hip with immediate severe left hip and leg pain. The fall witnessed by her  who called EMS as patient was unable to get up or bear any weight on left side.  Imaging showed acute mildly displaced left femoral neck fracture. Patient seen by orthopedic with plan for surgical fixation.  ED course: afebrile and vitals stable. Imaging showed acute mildly displaced left femoral neck fracture. EKG NSR with Q waves in septal leads VI-VII, TWI V4-6 (old). Labs significant for WBC 14, K 2.4, Mg 1.4 and phos 2.2. Patient received morphine, zofran, toradol, 40 meq oral KCL, magnesium sulfate and 1L NS bolus in ED. During my interview patient is awake and her  is present at bedside. She reports severe muscle spasm and left hip pain with minimal movement. Patient states at baseline she cane or walker sometime to avoid falls. She goes to physical therapy for degenerative R shoulder rotator cuff tear with improving strength and range  "of motion. She walks around the house with her 5 year old granddaughter without chest pain or dyspnea." Patient taken to the OR on  for Left total hip arthroplasty for femoral neck fracture.  No intraop complications noted,  mL, skin closed with Dermabond/prineo.  Patient will be weight-bearing as tolerated with posterior hip precautions to LLE x6 weeks.    Today, patient states that the oxycodone makes her very nauseous, she states that she tolerates hydrocodone better.  Patient is requesting double portions of meet with meals and pork with breakfast.  Daughter would like her to be seen by a psychologist, referral placed and daughter notified to call to schedule an appointment.    Patient will be treated at [facility] with PT and OT to improve functional status and ability to perform ADLs.     Past Medical History: Patient has a past medical history of Allergic rhinitis, Amblyopia, Carotid stenosis, Coronary atherosclerosis, Dyslipidemia, ESBL (extended spectrum beta-lactamase) producing bacteria infection, Hypertension, Nausea and vomiting (2017), and Subarachnoid hemorrhage due to ruptured aneurysm ().    Past Surgical History: Patient has a past surgical history that includes Anterior cruciate ligament repair (); Dental surgery; Cerebral aneurysm repair (); Tonsillectomy; Adenoidectomy; Appendectomy;  section; Cataract extraction w/  intraocular lens implant (Right, 2022); Cataract extraction w/  intraocular lens implant (Left, 2022); Eye surgery (Bilateral); and Hip Arthroplasty (Left, 2023).    Social History: Patient reports that she quit smoking about 24 years ago. Her smoking use included cigarettes. She has a 10.00 pack-year smoking history. She has been exposed to tobacco smoke. She has never used smokeless tobacco. She reports current alcohol use of about 7.0 standard drinks per week. She reports that she does not use drugs.    Family History: " family history includes Alcohol abuse in her father; Aneurysm in her mother; Gout in her brother; Heart disease in her brother; Hypertension in her father and mother; Kidney disease in her brother; No Known Problems in her daughter, daughter, and daughter.    Allergies: Patient has No Known Allergies.    ROS  Constitutional: Negative for fever   Eyes: Negative for blurred vision, double vision   Respiratory: Negative for cough, shortness of breath   Cardiovascular: Negative for chest pain, palpitations.  +mild leg swelling.   Gastrointestinal: Negative for abdominal pain, constipation, diarrhea, nausea, vomiting.   Genitourinary: Negative for dysuria, frequency   Musculoskeletal:  + generalized weakness.  +LLE pain  Skin: Negative for itching and rash.   Neurological: Negative for dizziness, headaches.   Psychiatric/Behavioral: Negative for depression. The patient is not nervous/anxious.      24 hour Vital Sign Range   Temp:  [97.7 °F (36.5 °C)-98 °F (36.7 °C)]   Pulse:  [74-77]   Resp:  [16-18]   BP: (101-125)/(60-67)   SpO2:  [93 %-95 %]     Current BMI: Body mass index is 23.8 kg/m².    PEx  Constitutional: Patient appears debilitated.  No distress noted  HENT:   Head: Normocephalic and atraumatic.   Eyes: Pupils are equal, round  Neck: Normal range of motion. Neck supple.   Cardiovascular: Normal rate, regular rhythm and normal heart sounds.    Pulmonary/Chest: Effort normal and breath sounds are clear  Abdominal: Soft. Bowel sounds are normal.   Musculoskeletal: Normal range of motion.   Neurological: Alert and oriented to person, place, and time.   Psychiatric: Normal mood and affect. Behavior is normal.   Skin: Skin is warm and dry. Full skin assessment completed.  Surgical dressing to LLE, only to removed by Ortho    Recent Labs   Lab 06/01/23 0457   *   K 3.9      CO2 25   BUN 12   CREATININE 0.7   MG 1.8       Recent Labs   Lab 06/01/23 0457   WBC 9.39   RBC 2.71*   HGB 8.3*   HCT 26.9*   PLT  292   MCV 99*   MCH 30.6   MCHC 30.9*       No results for input(s): POCTGLUCOSE in the last 168 hours.     Assessment and Plan:    Left displaced femoral neck fracture  S/p left total hip arthroplasty on 05/28  - PT/OT, WBAT, posterior precautions x6  - DVT PPX with Eliquis 215 mg BID times 35 days, end 7/3  - 1 week postop PPX antibiotics with cefadroxil 1 g BID, end 6/5  - maintain surgical dressing until removed by Orthopedics  - ortho PALMER DC patient weekly SNF  - follow-up with orthopedics after discharge    Acute postoperative pain  - initiated hydrocodone 5/325 or 10/325 mg q.4 hours PRN, discontinuing oxycodone, continue Celebrex 200 mg daily, methocarbamol 400 mg QID, Lyrica 75 mg qHS.  Bowel regimen in place.    Intermittent nausea  - associated with pain medication  - initiated PRN Zofran    Acute blood loss anemia  Iron deficiency anemia secondary to blood loss (chronic)  - On IV iron infusions at home with recent severe nose bleed earlier this year as contributor to chronic losses  - continue monitor twice weekly CBCs      Hypertension  - continue home amlodipine 10 mg daily and carvedilol 12.5 mg BID, holding HCTZ in setting of hypokalemia and hypomagnesemia     CKD stage 3  - continue to enter twice weekly BMPs, avoid nephrotoxic agents, renally dose medications when appropriate    Gastroesophageal reflux disease without esophagitis  - continue home PPI      Dyslipidemia  - continue atorvastatin 80 mg daily     Coronary artery disease involving native coronary artery of native heart without angina pectoris  - continue GDMT with beta blocker and statin     Primary osteoarthritis involving multiple joints  - follows with Rheumatology, Tylenol PRN    History of gout  - continue allopurinol 200 mg daily    Seasonal allergies  - continue fluticasone spray, Singulair 10 mg qHS.    Debility   - Continue with PT/OT for gait training and strengthening and restoration of ADL's   - Encourage mobility, OOB in  chair, and early ambulation as appropriate  - Fall precautions   - Monitor for bowel and bladder dysfunction  - Monitor for and prevent skin breakdown and pressure ulcers  - Continue DVT prophylaxis with apixaban 2.5 mg BID          Anticipate disposition:  Home with home health      Follow-up needed during SNF stay-    Appointment not to send patient to- chemo 6/5, ortho 6/12    Follow-up needed after discharge from SNF: PCP, Orthopedics    Future Appointments   Date Time Provider Department Center   6/5/2023  8:30 AM NON-CHEMO 01 [facility] CHEMO [facility]   6/12/2023 11:00 AM Mckay Cosby NP [facility] ORTHO [address]   7/10/2023 10:45 AM [place] [facility]TH[facility] [address]   8/7/2023 10:30 AM Armani Cai PA-C [facility] [place]   8/16/2023 11:00 AM LAB, [facility] [place]   8/18/2023  1:30 PM Anh Martinez MD [facility] HEM ONC [facility]   8/22/2023 10:30 AM Juan Naranjo MD [id number] [place]   3/5/2024  9:00 AM Michael Lal MD [facility] [id number] [facility]         I certify that SNF services are required to be given on an inpatient basis because Jessica Floyd needs for skilled nursing care and/or skilled rehabilitation are required on a daily basis and such services can only practically be provided in a skilled nursing facility setting and are for an ongoing condition for which she received inpatient care in the hospital.     Total time of the visit 122 minutes   Non physical exam/ non charting time: 70 minutes   Description of non physical exam/non charting time: counseling patient on clinical conditions and therapies provided.  Extensive chart review completed including all consultation notes.  All pertinent laboratory and radiographical images reviewed.        Alyssia Ambrose NP  Department of Hospital Medicine   [facility]     DOS: 6/1/2023       Patient note was created using MModal Dictation.  Any errors in syntax or even  information may not have been identified and edited on initial review prior to signing this note.

## 2023-06-01 NOTE — PT/OT/SLP EVAL
Occupational Therapy   Evaluation / Treatment    Name: Jessica Floyd  MRN: [id number]  Admit Date: 5/31/2023  Recent Surgeries: TOTAL HIP ARTHROPLASTY (Left)   Admitting Diagnosis:  Left displaced femoral neck fracture       General Precautions: Standard, fall (vision impaired (pt with L vision field cut from a previous aneurysm))  Orthopedic Precautions:LLE weight bearing as tolerated, LLE posterior precautions   Braces: N/A    Recommendations:     Discharge Recommendations: home health OT  Level of Assistance Recommended: intermittent supervision  Discharge Equipment Recommendations:  bedside commode, walker, rolling  Barriers to discharge:  Decreased caregiver support    Assessment:     Jessica Floyd is a 72 y.o. female with a medical diagnosis of Left displaced femoral neck fracture.  She presents with performance deficits affecting function: weakness, impaired endurance, impaired self care skills, impaired functional mobility, gait instability, impaired balance, decreased lower extremity function, pain, orthopedic precautions.      Pt with good participation and motivation during today's evaluation and occupational therapy treatment session. She was able to ambulate from bed to bathroom using a RW with SBA, completed sponge bath and dressing seated at w/c needing set-up to mod assist, at the sink 2/2 to pain in R LE, endurance and standing balance deficits. Pt reported that she generally wears dresses for comfort and politely declined donning pants. Pt would benefit from additional skilled OT services on SNF unit to further maximize (I) potential. She remains limited in ADLs, functional mobility, and functional transfers and is currently performing tasks well below PLOF of (I) to modified (I). Pt is also at high risk for falls. Goals established.     Rehab Potential is good    Activity Tolerance: Good    Plan:     Patient to be seen 5 x/week to address the above listed problems via self-care/home management,  therapeutic activities, therapeutic exercises  Plan of Care Expires: 06/15/23  Plan of Care Reviewed with: patient    Subjective     Chief Complaint: None  Patient/Family Comments/goals: Gain (I)    Occupational Profile:  Living Environment: lives in a [facility] with 3 EMILY with B HR, (Kitchen has a 1STE). Pt. Home has a WIS with built in shower seat and grab bars   Previous level of function: Ind in all adls and mobility   Roles and Routines: wife, mom and grandmother   Equipment Used at Home: rollator, raised toilet, cane, straight  Assistance upon Discharge: Limited assistance from spouse    Pain/Comfort:  Pain Rating 1: 3/10  Location 1: hip  Pain Addressed 1: Nurse notified, Reposition, Distraction, Cessation of Activity  Pain Rating Post-Intervention 1: 0/10    Patients cultural, spiritual, Mandaeism conflicts given the current situation: no    Objective:     Communicated with: nurse prior to session.  Patient found supine with Other (comments) (no activie lines) upon OT entry to room.    Occupational Performance:        06/01/23 0945   Eating   Assistance Needed Independent   CARE Score - Eating 6   Eating Discharge Goal   Discharge Goal 6   Oral Hygiene   Assistance Needed Independent   Comment sitting at sink   CARE Score - Oral Hygiene 6   Oral Hygiene Discharge Goal   Discharge Goal 6   Toileting Hygiene   Assistance Needed Incidental touching   CARE Score - Toileting Hygiene 4   Toileting Hygiene Discharge Goal   Discharge Goal 6   Shower/Bathe Self   Assistance Needed Physical assistance   Physical Assistance Level 26%-50%   CARE Score - Shower/Bathe Self 3   Shower/Bathe Self Discharge Goal   Discharge Goal 6   Upper Body Dressing   Assistance Needed Set-up / clean-up   CARE Score - Upper Body Dressing 5   Upper Body Dressing Discharge Goal   Discharge Goal 6   Lower Body Dressing   Assistance Needed Physical assistance   Physical Assistance Level Total assistance   CARE Score - Lower Body Dressing 1   Lower  Body Dressing Discharge Goal   Discharge Goal 6   Putting On/Taking Off Footwear   Assistance Needed Physical assistance   Physical Assistance Level Total assistance   CARE Score - Putting On/Taking Off Footwear 1   Putting On/Taking Off Footwear Discharge Goal   Discharge Goal 6   Toilet Transfer   Assistance Needed Incidental touching   CARE Score - Toilet Transfer 4   Toilet Transfer Discharge Goal   Discharge Goal 6     Cognitive/Visual Perceptual:  Cognitive/Psychosocial Skills:     -       Oriented to: Person, Place, Time, and Situation   -       Follows Commands/attention:Follows multistep  commands  -       Communication: clear/fluent  -       Memory: No Deficits noted  -       Safety awareness/insight to disability: impaired   -       Mood/Affect/Coping skills/emotional control: Cooperative  Visual/Perceptual:      -Pt reported visual field cuts in left eye      Physical Exam:  Balance: -       static sitting edge of bed - good  Dominant hand: -       right / ambidextrous  Upper Extremity Range of Motion:     -       Right Upper Extremity: WFL except torn rotator cuff but only min deficits and no pain with full ROM  -       Left Upper Extremity: WFL  Upper Extremity Strength:    -       Right Upper Extremity: WFL  -       Left Upper Extremity: WFL   Strength:    -       Right Upper Extremity: WFL  -       Left Upper Extremity: WFL  Fine Motor Coordination:    -       Intact  {IP OT FINE MOTOR COORDINATION     AMPAC 6 Click ADL:  AMPAC Total Score: 20    Treatment & Education:  -Pt educated to call for assistance and to transfer with hospital staff only  -Provided education regarding role of OT, POC, Posterior Hip precautions & discharge recommendations with pt  verbalizing understanding.  Pt had no further questions & when asked whether there were any concerns pt reported none.     Patient left up in chair with  physical therapy    GOALS:   Multidisciplinary Problems       Occupational Therapy Goals           Problem: Occupational Therapy    Goal Priority Disciplines Outcome Interventions   Occupational Therapy Goal     OT, PT/OT Ongoing, Progressing    Description: Goals to be met by: 23     Patient will increase functional independence with ADLs by performing:    UE Dressing with [place].  LE Dressing with [place].  Grooming while standing with [place].  Toileting from toilet with [place] for hygiene and clothing management.   Bathing from  sitting at sink with [place].  Toilet transfer to toilet with Modified [place].                         History:     Past Medical History:   Diagnosis Date    Allergic rhinitis     Amblyopia     Carotid stenosis     Coronary atherosclerosis     [facility] 2014: 20% mLAD, 50% mCx, 20%, mRCA    Dyslipidemia     ESBL (extended spectrum beta-lactamase) producing bacteria infection     UTI    Hypertension     Nausea and vomiting 2017    Subarachnoid hemorrhage due to ruptured aneurysm          Past Surgical History:   Procedure Laterality Date    ADENOIDECTOMY      ANTERIOR CRUCIATE LIGAMENT REPAIR      APPENDECTOMY      CATARACT EXTRACTION W/  INTRAOCULAR LENS IMPLANT Right 2022    Procedure: EXTRACTION, CATARACT, WITH IOL INSERTION;  Surgeon: Higinio Cristina MD;  Location: [facility];  Service: Ophthalmology;  Laterality: Right;    CATARACT EXTRACTION W/  INTRAOCULAR LENS IMPLANT Left 2022    Procedure: EXTRACTION, CATARACT, WITH IOL INSERTION;  Surgeon: Higinio Cristina MD;  Location: [facility];  Service: Ophthalmology;  Laterality: Left;    CEREBRAL ANEURYSM REPAIR      clip     SECTION      DENTAL SURGERY      EYE SURGERY Bilateral     cataract removal and lens implants    HIP ARTHROPLASTY Left 2023    Procedure: TOTAL HIP ARTHROPLASTY;  Surgeon: Juan Wan MD;  Location: [address];  Service: Orthopedics;  Laterality: Left;    TONSILLECTOMY         Time Tracking:     OT Date of  Treatment: 06/01/23  OT Start Time: 0906  OT Stop Time: 1000  OT Total Time (min): 54 min    Billable Minutes:Evaluation 15  Self Care/Home Management 39    6/1/2023

## 2023-06-01 NOTE — PT/OT/SLP EVAL
Physical Therapy Evaluation    Patient Name:  Jessica Floyd   MRN:  [id number]  Admit Date: 5/31/2023  Recent Surgeries: TOTAL HIP ARTHROPLASTY (Left)    General Precautions: Standard, fall, vision impaired (pt with L vision field cut from a previous aneurysm)   Orthopedic Precautions: LLE weight bearing as tolerated ; LLE posterior hip precautions  Braces: N/A    Recommendations:     Discharge Recommendations: home health PT   Level of Assistance Recommended: Intermittent assistance   Discharge Equipment Recommendations: bedside commode, walker, rolling   Barriers to discharge: Inaccessible home environment, Decreased caregiver support    Assessment:     Jessica Floyd is a 72 y.o. female admitted with a medical diagnosis of Left displaced femoral neck fracture .  Performance deficits affecting function  weakness, impaired endurance, impaired self care skills, impaired functional mobility, gait instability, impaired balance, decreased upper extremity function, decreased lower extremity function, decreased ROM, impaired skin, edema, impaired cardiopulmonary response to activity, orthopedic precautions. Pt was Mod I with functional mobility PTA and now requires MaxA for bed mobility and Tonya/CGA for GT and stair negotiation. Pt is very motivated to participate with PT and would benefit from continued SNF rehab.    Rehab Potential is good     Activity Tolerance: Good    Plan:     Patient to be seen 5 x/week to address the above listed problems via gait training, therapeutic activities, therapeutic exercises, wheelchair management/training     Plan of Care Expires: 07/01/23  Plan of Care Reviewed with: patient    Subjective     Chief Complaint: weakness  Patient/Family Comments/goals:  gain (I)  Pain/Comfort:  Pain Rating 1: 0/10  Pain Rating Post-Intervention 1: 0/10    Living Environment:     Per pt, pt and spouse reside in [facility] with 3 EMILY B HR. Pt has additional 1 step into the kitchen area. Pt has a walk-in shower with  built in seat and grab bars in place.  Prior to admission, patients level of function was I, reported amb 2000 steps/day.  Equipment used at home: cane, straight, raised toilet, rollator.  DME owned (not currently used):  rollator and SPC .  Upon discharge, patient will have limited assistance from spouse.      Patients cultural, spiritual, Jewish conflicts given the current situation: no    Objective:     Communicated with pt's nurse prior to session.  Patient found up in chair with    upon PT entry to room.    Exams:  Cognitive Exam:  Patient is oriented to Person, Place, Time, and Situation  Gross Motor Coordination:  WFL  Sensation:    -       Intact  Skin Integrity/Edema:      -       Skin integrity: Visible skin intact  -       Edema: Mild LLE  RLE ROM: WFL  RLE Strength: WFL  LLE ROM: WFL except Hip N/T d.t surgery  LLE Strength: WFL except Hip N/T d.t surgery    Functional Mobility:     06/01/23 1538   Prior Functioning: Everyday Activities   Self Care Independent   Indoor Mobility (Ambulation) Independent   Stairs Independent   Functional Cognition Independent   Prior Device Use Walker   Roll Left and Right   Physical Assistance Level 51%-75%   Comment per OT eval   CARE Score - Roll Left and Right 2   Sit to Lying   Physical Assistance Level 51%-75%   CARE Score - Sit to Lying 2   Lying to Sitting on Side of Bed   Physical Assistance Level 51%-75%   CARE Score - Lying to Sitting on Side of Bed 2   Sit to Stand   Assistance Needed Incidental touching   Comment with RW   CARE Score - Sit to Stand 4   Chair/Bed-to-Chair Transfer   Assistance Needed Incidental touching   Comment with RW, SPT   CARE Score - Chair/Bed-to-Chair Transfer 4   Chair/Bed-to-Chair Transfer Discharge Goal   Discharge Goal 5   Car Transfer   Reason if not Attempted Environmental limitations   CARE Score - Car Transfer 10   Walk 10 Feet   Assistance Needed Incidental touching   Comment with RW 160ft   CARE Score - Walk 10 Feet 4    Walk 50 Feet with Two Turns   Assistance Needed Incidental touching   Comment with RW 160ft   CARE Score - Walk 50 Feet with Two Turns 4   Walk 150 Feet   Assistance Needed Incidental touching   Comment with RW 160ft   CARE Score - Walk 150 Feet 4   Walking 10 Feet on Uneven Surfaces   Physical Assistance Level 25% or less   Comment with RW   CARE Score - Walking 10 Feet on Uneven Surfaces 3   1 Step (Curb)   Physical Assistance Level 25% or less   Comment 4 inch curb step with RW   CARE Score - 1 Step (Curb) 3   4 Steps   Physical Assistance Level 25% or less   Comment with B rails   CARE Score - 4 Steps 3   12 Steps   Comment pt fatigued   Reason if not Attempted Safety concerns   CARE Score - 12 Steps 88   Picking Up Object   Reason if not Attempted Safety concerns   CARE Score - Picking Up Object 88   Uses a Wheelchair/Scooter?   Uses a Wheelchair/Scooter? Yes   Wheel 50 Feet with Two Turns   Physical Assistance Level 26%-50%   Comment Mod A needed for direction as pt with L vision field cut.   CARE Score - Wheel 50 Feet with Two Turns 3   Type of Wheelchair/Scooter Manual   Wheel 150 Feet   Physical Assistance Level 26%-50%   CARE Score - Wheel 150 Feet 3   Type of Wheelchair/Scooter Manual       Therapeutic Activities and Exercises: Additional time spent completing GT and providing education to pt.    Education:  Patient provided with daily orientation and goals of this PT session.  Patient educated to transfer to bedside chair/bedside commode/bathroom with RN/PCT present.   Patient educated on Fall risk and plan of care by explanation.   Patient Verbalized Understanding.  Time provided for therapeutic counseling and discussion of current health disposition. All questions answered to satisfaction, within scope of PT practice; voiced no other concerns. White board updated in patient's room, RN notified of session.     AM-PAC 6 CLICK MOBILITY  Total Score:18     Patient left up in chair with call button in  reach.    GOALS:   Multidisciplinary Problems       Physical Therapy Goals          Problem: Physical Therapy    Goal Priority Disciplines Outcome Goal Variances Interventions   Physical Therapy Goal     PT, PT/OT Ongoing, Progressing     Description: Goals to be met by: 7/1/23     Patient will increase functional independence with mobility by performing:    . Supine to sit with Set-up [place]  . Sit to supine with Set-up [place]  . Rolling to Left and Right with Set-up Assistance.  . Sit to stand transfer with Supervision  . Bed to chair transfer with Supervision using Rolling Walker/ LRAD  . Gait  x 150 feet with Supervision using Rolling Walker/LRAD  . Wheelchair propulsion x150 feet with Contact Guard Assistance using bilateral uppper extremities  . Ascend/descend 12 stair with bilateral Handrails Stand-by Assistance using No Assistive Device.   . Ascend/Descend 4 inch curb step with Stand-by Assistance using Rolling Walker/ LRAD.                         History:     Past Medical History:   Diagnosis Date    Allergic rhinitis     Amblyopia     Carotid stenosis     Coronary atherosclerosis     Outside [facility] 6/2014: 20% mLAD, 50% mCx, 20%, mRCA    Dyslipidemia     ESBL (extended spectrum beta-lactamase) producing bacteria infection     UTI    Hypertension     Nausea and vomiting 05/26/2017    Subarachnoid hemorrhage due to ruptured aneurysm 1999       Past Surgical History:   Procedure Laterality Date    ADENOIDECTOMY      ANTERIOR CRUCIATE LIGAMENT REPAIR  2012    APPENDECTOMY      CATARACT EXTRACTION W/  INTRAOCULAR LENS IMPLANT Right 11/07/2022    Procedure: EXTRACTION, CATARACT, WITH IOL INSERTION;  Surgeon: Higinio Cristina MD;  Location: [facility];  Service: Ophthalmology;  Laterality: Right;    CATARACT EXTRACTION W/  INTRAOCULAR LENS IMPLANT Left 11/21/2022    Procedure: EXTRACTION, CATARACT, WITH IOL INSERTION;  Surgeon: Higinio Cristina MD;  Location: [facility];  Service: Ophthalmology;  Laterality:  Left;    CEREBRAL ANEURYSM REPAIR      clip     SECTION      DENTAL SURGERY      EYE SURGERY Bilateral     cataract removal and lens implants    HIP ARTHROPLASTY Left 2023    Procedure: TOTAL HIP ARTHROPLASTY;  Surgeon: Juan Wan MD;  Location: [facility] OR [address] [place] [address];  Service: Orthopedics;  Laterality: Left;    TONSILLECTOMY         Time Tracking:     PT Received On: 23  PT Start Time: 1005     PT Stop Time: 1053  PT Total Time (min): 48 min     Billable Minutes: Evaluation 20, Gait Training 15, and Therapeutic Activity 13      2023

## 2023-06-02 PROBLEM — E87.1 HYPONATREMIA: Status: ACTIVE | Noted: 2023-06-02

## 2023-06-02 PROCEDURE — 11000004 HC SNF PRIVATE

## 2023-06-02 PROCEDURE — 97530 THERAPEUTIC ACTIVITIES: CPT | Mod: CQ

## 2023-06-02 PROCEDURE — 94761 N-INVAS EAR/PLS OXIMETRY MLT: CPT

## 2023-06-02 PROCEDURE — 97110 THERAPEUTIC EXERCISES: CPT | Mod: CQ

## 2023-06-02 PROCEDURE — 99306 1ST NF CARE HIGH MDM 50: CPT | Mod: AI,,, | Performed by: HOSPITALIST

## 2023-06-02 PROCEDURE — 99306 PR NURSING FACILITY CARE, INIT, HIGH SEVERITY: ICD-10-PCS | Mod: AI,,, | Performed by: HOSPITALIST

## 2023-06-02 PROCEDURE — 25000003 PHARM REV CODE 250: Performed by: NURSE PRACTITIONER

## 2023-06-02 PROCEDURE — 97530 THERAPEUTIC ACTIVITIES: CPT | Mod: CO

## 2023-06-02 PROCEDURE — 97110 THERAPEUTIC EXERCISES: CPT | Mod: CO

## 2023-06-02 PROCEDURE — 97116 GAIT TRAINING THERAPY: CPT | Mod: CQ

## 2023-06-02 PROCEDURE — 25000003 PHARM REV CODE 250: Performed by: HOSPITALIST

## 2023-06-02 RX ADMIN — METHOCARBAMOL 500 MG: 500 TABLET ORAL at 04:06

## 2023-06-02 RX ADMIN — ALLOPURINOL 200 MG: 100 TABLET ORAL at 09:06

## 2023-06-02 RX ADMIN — HYDROCODONE BITARTRATE AND ACETAMINOPHEN 1 TABLET: 5; 325 TABLET ORAL at 11:06

## 2023-06-02 RX ADMIN — PREGABALIN 75 MG: 75 CAPSULE ORAL at 09:06

## 2023-06-02 RX ADMIN — METHOCARBAMOL 500 MG: 500 TABLET ORAL at 09:06

## 2023-06-02 RX ADMIN — PANTOPRAZOLE SODIUM 40 MG: 40 TABLET, DELAYED RELEASE ORAL at 09:06

## 2023-06-02 RX ADMIN — MONTELUKAST 10 MG: 10 TABLET, FILM COATED ORAL at 09:06

## 2023-06-02 RX ADMIN — CHOLECALCIFEROL TAB 25 MCG (1000 UNIT) 2000 UNITS: 25 TAB at 09:06

## 2023-06-02 RX ADMIN — METHOCARBAMOL 500 MG: 500 TABLET ORAL at 12:06

## 2023-06-02 RX ADMIN — SENNOSIDES AND DOCUSATE SODIUM 1 TABLET: 50; 8.6 TABLET ORAL at 09:06

## 2023-06-02 RX ADMIN — APIXABAN 2.5 MG: 2.5 TABLET, FILM COATED ORAL at 09:06

## 2023-06-02 RX ADMIN — ATORVASTATIN CALCIUM 80 MG: 40 TABLET, FILM COATED ORAL at 09:06

## 2023-06-02 RX ADMIN — CEFADROXIL 1 G: 1000 TABLET ORAL at 09:06

## 2023-06-02 RX ADMIN — CARVEDILOL 6.25 MG: 3.12 TABLET, FILM COATED ORAL at 04:06

## 2023-06-02 RX ADMIN — CELECOXIB 200 MG: 200 CAPSULE ORAL at 09:06

## 2023-06-02 RX ADMIN — Medication 2 TABLET: at 09:06

## 2023-06-02 RX ADMIN — FLUTICASONE PROPIONATE 50 MCG: 50 SPRAY, METERED NASAL at 09:06

## 2023-06-02 NOTE — PLAN OF CARE
Problem: Occupational Therapy  Goal: Occupational Therapy Goal  Description: Goals to be met by: 6/20/23     Patient will increase functional independence with ADLs by performing:    UE Dressing with [place].  LE Dressing with [place].  Grooming while standing with [place].  Toileting from toilet with [place] for hygiene and clothing management.   Bathing from  sitting at sink with [place].  Toilet transfer to toilet with Modified [place].    Outcome: Ongoing, Progressing

## 2023-06-02 NOTE — PT/OT/SLP PROGRESS
"Occupational Therapy   Treatment    Name: Jessica Floyd  MRN: [id number]  Admit Date: 5/31/2023  Admitting Diagnosis:  Left displaced femoral neck fracture    General Precautions: Standard, fall, vision impaired   Orthopedic Precautions: LLE weight bearing as tolerated, LLE posterior precautions   Braces: N/A    Recommendations:     Discharge Recommendations:  home health OT  Level of Assistance Recommended at Discharge: 24 hours physical assistance for all ADL's and home management tasks  Discharge Equipment Recommendations: bedside commode, walker, rolling  Barriers to discharge:  Decreased caregiver support    Assessment:     Jessica Floyd is a 72 y.o. female with a medical diagnosis of Left displaced femoral neck fracture .  She presents with performance deficits affecting function are weakness, impaired endurance, impaired self care skills, impaired functional mobility, gait instability, impaired balance, decreased lower extremity function, pain, orthopedic precautions.     Pt participated well during today's session. Pt tolerated tx session without incident and is making progress, however, continues to demonstrate deficits with self care skills, balance, functional mobility, UB strength and endurance. Pt will benefit from continued OT services to progress towards goals.     Rehab Potential is good    Activity tolerance:  Good    Plan:     Patient to be seen 5 x/week to address the above listed problems via self-care/home management, therapeutic activities, therapeutic exercises    Plan of Care Expires: 06/15/23  Plan of Care Reviewed with: patient    Subjective   "Sure, I'm ready'  Communicated with: Yony prior to session.  .    Pain/Comfort:  Pain Rating 1: 8/10  Location - Side 1: Left  Location - Orientation 1: generalized  Location 1: hip  Pain Addressed 1: Pre-medicate for activity, Reposition, Distraction  Pain Rating Post-Intervention 1: 8/10    Patient's cultural, spiritual, Tenriism conflicts given the " current situation:  no    Objective:     Patient found up in chair with PTA present in rehab gym.     Functional Mobility/Transfers:  Patient completed Sit <> Stand Transfer with contact guard assistance  with  rolling walker     Activities of Daily Living:  Not performed, already completed.     [facility] 6 Click ADL: 20    OT Exercises: UE Ergometer 10 min with minimum resistance.   Pt with various rest breaks throughout secondary to fatigue.      Therex performed to improve overall endurance, ROM and UB strength required for functional transfers, ADL's and w/c propulsion.     Treatment & Education:  Pt with standing activity on today with SBA and RW. Pt at raised counter perform peg activity with focus on standing tolerance, dynamic standing bal, crossing midline, and to promote independence with homemaking and self care tasks.  Patient maintain stance approx 4 min.     Pt educated on:  - role of OT  - level of assistance  - energy conservation and task modification to maximized independence with ADL's and mobility   -  safety while performing functional transfers and self care tasks  - Orthopedic precautions and importance to adhering   - progress towards OT goals     Patient left up in chair with call button in reach    GOALS:   Multidisciplinary Problems       Occupational Therapy Goals          Problem: Occupational Therapy    Goal Priority Disciplines Outcome Interventions   Occupational Therapy Goal     OT, PT/OT Ongoing, Progressing    Description: Goals to be met by: 6/20/23     Patient will increase functional independence with ADLs by performing:    UE Dressing with [place].  LE Dressing with [place].  Grooming while standing with [place].  Toileting from toilet with [place] for hygiene and clothing management.   Bathing from  sitting at sink with [place].  Toilet transfer to toilet with Modified [place].                         Time Tracking:     OT Date of Treatment:  06/02/23  OT Start Time: 1128    OT Stop Time: 1206  OT Total Time (min): 38 min    Billable Minutes:Therapeutic Activity 24  Therapeutic Exercise 14    6/2/2023  A client care conference was performed between the LOTR and MARTELL, prior to treatment by MARTELL, to discuss the patient's status, treatment plan and established goals.

## 2023-06-02 NOTE — PLAN OF CARE
Recommendations  1. Continue regular diet. Double meats  2. Encourage adequate intake.   3. RD to monitor PO intake, lab, and plan of care.   4.  to assist in meal choices daily.   Goals: Pt to meet her nutritional needs >75% of EEN and EPN  Nutrition Goal Status: new     Assessment and Plan  Mild Malnutrition  Malnutrition Type:  Context: social/environmental circumstances   Level: Mild     Related to (etiology):   Inadequate oral intake and lack of appetite      Signs and Symptoms (as evidenced by):   Intake of  <75% of estimated protein needs  Weight Loss (Malnutrition): 6% weight loss in 6 months (not significant)     Malnutrition Characteristic Summary:  Muscle Mass (Malnutrition): moderate depletion     Plan  Collaboration with other providers  Nutrition education-high protein diet   General diet  Increased protein diet-double meats

## 2023-06-02 NOTE — ASSESSMENT & PLAN NOTE
Mild Malnutrition  Malnutrition Type:  Context: social/environmental circumstances   Level: Mild     Related to (etiology):   Inadequate oral intake and lack of appetite      Signs and Symptoms (as evidenced by):   Intake of  <75% of estimated protein needs  Weight Loss (Malnutrition): 6% weight loss in 6 months (not significant)     Malnutrition Characteristic Summary:  Muscle Mass (Malnutrition): moderate depletion     Plan  Collaboration with other providers  Nutrition education-high protein diet   General diet  Increased protein diet-double meats

## 2023-06-02 NOTE — PLAN OF CARE
Problem: Adult Inpatient Plan of Care  Goal: Plan of Care Review  6/2/2023 1819 by Edith Roach LPN  Outcome: Ongoing, Progressing  6/2/2023 1819 by Edith Roach LPN  Outcome: Ongoing, Progressing  Goal: Patient-Specific Goal (Individualized)  6/2/2023 1819 by Edith Roach LPN  Outcome: Ongoing, Progressing  6/2/2023 1819 by Edith Roach LPN  Outcome: Ongoing, Progressing  Goal: Absence of Hospital-Acquired Illness or Injury  6/2/2023 1819 by Edith Roach LPN  Outcome: Ongoing, Progressing  6/2/2023 1819 by Edith Roach LPN  Outcome: Ongoing, Progressing  Goal: Optimal Comfort and Wellbeing  6/2/2023 1819 by Edith Roach LPN  Outcome: Ongoing, Progressing  6/2/2023 1819 by Edith Roach LPN  Outcome: Ongoing, Progressing  Goal: Readiness for Transition of Care  6/2/2023 1819 by Edith Roach LPN  Outcome: Ongoing, Progressing  6/2/2023 1819 by Edith Roach LPN  Outcome: Ongoing, Progressing     Problem: Fall Injury Risk  Goal: Absence of Fall and Fall-Related Injury  6/2/2023 1819 by Edith Roach LPN  Outcome: Ongoing, Progressing  6/2/2023 1819 by Edith Roach LPN  Outcome: Ongoing, Progressing

## 2023-06-02 NOTE — PT/OT/SLP PROGRESS
"Physical Therapy Treatment    Patient Name:  Jessica Floyd   MRN:  [id number]  Admit Date: 5/31/2023  Admitting Diagnosis: Left displaced femoral neck fracture  Recent Surgeries:     General Precautions: Standard, fall, vision impaired (pt with L vision field cut from a previous aneurysm)  Orthopedic Precautions: LLE weight bearing as tolerated, LLE posterior precautions  Braces: N/A    Recommendations:     Discharge Recommendations: home health PT  Level of Assistance Recommended at Discharge: Intermittent assistance   Discharge Equipment Recommendations: bedside commode, walker, rolling  Barriers to discharge: Inaccessible home environment, Decreased caregiver support    Assessment:     Jessica Floyd is a 72 y.o. female admitted with a medical diagnosis of Left displaced femoral neck fracture . Pt tolerated well, pt is very pleasant, demo good effort, talkative, highly motivated, pt would continue to benefit from skilled PT services to improve overall functional mobility, strength and endurance.       Performance deficits affecting function: weakness, impaired endurance, impaired self care skills, impaired functional mobility, gait instability, impaired balance, decreased upper extremity function, decreased lower extremity function, decreased ROM, impaired skin, edema, impaired cardiopulmonary response to activity, orthopedic precautions.    Rehab Potential is good    Activity Tolerance: Fair    Plan:     Patient to be seen 5 x/week to address the above listed problems via gait training, therapeutic activities, therapeutic exercises, wheelchair management/training    Plan of Care Expires: 07/01/23  Plan of Care Reviewed with: patient    Subjective     "Just want to get better".     Pain/Comfort:  Pain Rating 1: 0/10 ("uncomfortable" not really pain)  Location - Side 1: Left  Location - Orientation 1: generalized  Location 1: hip  Pain Addressed 1: Reposition, Distraction  Pain Rating Post-Intervention 1: " "0/10    Patient's cultural, spiritual, Jehovah's witness conflicts given the current situation:  no    Objective:       Patient found with  (in WC) upon PT entry to room.     Therapeutic Activities and Exercises: 2x10 reps AP,GS, LAQ  Pt able torecall 3/3 hip prec    Functional Mobility:  Transfers:     Sit to Stand:  contact guard assistance with rolling walker  Gait: amb with RW CGA ~ 150 ft no LOB step through gait pattern  Stairs:  asc/toro 4 steps with BHR CGA x 2 trials (8 steps) vcs for safety/tech  Curb asc/toro 4" curb with RW CGA vcs for safety/tech    AM-PAC 6 CLICK MOBILITY  18    Patient left up in chair with  with OT .    GOALS:   Multidisciplinary Problems       Physical Therapy Goals          Problem: Physical Therapy    Goal Priority Disciplines Outcome Goal Variances Interventions   Physical Therapy Goal     PT, PT/OT Ongoing, Progressing     Description: Goals to be met by: 7/1/23     Patient will increase functional independence with mobility by performing:    . Supine to sit with Set-up [place]  . Sit to supine with Set-up [place]  . Rolling to Left and Right with Set-up Assistance.  . Sit to stand transfer with Supervision  . Bed to chair transfer with Supervision using Rolling Walker/ LRAD  . Gait  x 150 feet with Supervision using Rolling Walker/LRAD  . Wheelchair propulsion x150 feet with Contact Guard Assistance using bilateral uppper extremities  . Ascend/descend 12 stair with bilateral Handrails Stand-by Assistance using No Assistive Device.   . Ascend/Descend 4 inch curb step with Stand-by Assistance using Rolling Walker/ LRAD.                         Time Tracking:     PT Received On: 06/02/23  PT Start Time: 1042  PT Stop Time: 1122  PT Total Time (min): 40 min    Billable Minutes: Gait Training 14, Therapeutic Activity 15, and Therapeutic Exercise 11    Treatment Type: Treatment  PT/PTA: PTA     Number of PTA visits since last PT visit: 1 06/02/2023  "

## 2023-06-02 NOTE — H&P
"Hospital Medicine  Skilled Nursing Facility   History and Physical Exam      Date of Service: 06/02/2023      Patient Name: Jessica Floyd  MRN: [id number]  Admission Date: 5/31/2023  Attending Physician: Guille Ulrich MD  Primary Care Provider: Effie Olmedo MD  Code Status: Full Code      Principal problem:  Left displaced femoral neck fracture      Chief Complaint:   Chief Complaint   Patient presents with    Hip Injury     Admitted to OS for rehabilitation following hospital stay for left femoral neck fracture s/p left total hip arthroplasty.           HPI:   "Jessica Floyd is a 72 year old female with PMHx of HTN, CKD stage 3, CAD, SAH 2/2 aneurysm s/p clipping, right shoulder DJD and rotator cuff tear, GERD who presents to SNF following hospitalization for left femoral neck fracture s/p left hip total arthroplasty on 05/28 with Dr. Wan.  Admission to SNF for secondary weakness and debility.     Patient [facility] ED on [facility] for evaluation of left hip and leg pain after a mechanical fall from standing height.  Per [facility], Pt states that she was walking to bathroom and tripped over a folding table and fell to hardwood floor. Patient landed on left hip with immediate severe left hip and leg pain. The fall witnessed by her  who called EMS as patient was unable to get up or bear any weight on left side.  Imaging showed acute mildly displaced left femoral neck fracture. Patient seen by orthopedic with plan for surgical fixation.  ED course: afebrile and vitals stable. Imaging showed acute mildly displaced left femoral neck fracture. EKG NSR with Q waves in septal leads VI-VII, TWI V4-6 (old). Labs significant for WBC 14, K 2.4, Mg 1.4 and phos 2.2. Patient received morphine, zofran, toradol, 40 meq oral KCL, magnesium sulfate and 1L NS bolus in ED. During my interview patient is awake and her  is present at bedside. She reports severe muscle spasm and left hip pain with minimal " "movement. Patient states at baseline she cane or walker sometime to avoid falls. She goes to physical therapy for degenerative R shoulder rotator cuff tear with improving strength and range of motion. She walks around the house with her 5 year old granddaughter without chest pain or dyspnea." Patient taken to the OR on 05/28 for Left total hip arthroplasty for femoral neck fracture.  No intraop complications noted,  mL, skin closed with Dermabond/prineo.  Patient will be weight-bearing as tolerated with posterior hip precautions to LLE x6 weeks.     Today, patient states that the oxycodone makes her very nauseous, she states that she tolerates hydrocodone better.  Patient is requesting double portions of meet with meals and pork with breakfast.  Daughter would like her to be seen by a psychologist, referral placed and daughter notified to call to schedule an appointment." Per Alyssia Ambrose NP on 6/1/23.     She is doing well today. Says that her pain is controlled with the medications. She is working well with therapy. She denies any lightheadedness. She has a good appetite and is having regular BMs.     Patient admitted with skilled services with PT and OT to improve functional status and ability to perform ADLs.       Past Medical History:   Past Medical History:   Diagnosis Date    Allergic rhinitis     Amblyopia     Carotid stenosis     Coronary atherosclerosis     Outside [facility] 6/2014: 20% mLAD, 50% mCx, 20%, mRCA    Dyslipidemia     ESBL (extended spectrum beta-lactamase) producing bacteria infection     UTI    Hypertension     Nausea and vomiting 05/26/2017    Subarachnoid hemorrhage due to ruptured aneurysm 1999       Past Surgical History:   Past Surgical History:   Procedure Laterality Date    ADENOIDECTOMY      ANTERIOR CRUCIATE LIGAMENT REPAIR  2012    APPENDECTOMY      CATARACT EXTRACTION W/  INTRAOCULAR LENS IMPLANT Right 11/07/2022    Procedure: EXTRACTION, CATARACT, WITH IOL INSERTION;  Surgeon: " Higinio Cristina MD;  Location: [facility];  Service: Ophthalmology;  Laterality: Right;    CATARACT EXTRACTION W/  INTRAOCULAR LENS IMPLANT Left 2022    Procedure: EXTRACTION, CATARACT, WITH IOL INSERTION;  Surgeon: Higinio Cristina MD;  Location: [facility];  Service: Ophthalmology;  Laterality: Left;    CEREBRAL ANEURYSM REPAIR      clip     SECTION      DENTAL SURGERY      EYE SURGERY Bilateral     cataract removal and lens implants    HIP ARTHROPLASTY Left 2023    Procedure: TOTAL HIP ARTHROPLASTY;  Surgeon: Juan Wan MD;  Location: [address];  Service: Orthopedics;  Laterality: Left;    TONSILLECTOMY         Social History:   Tobacco Use    Smoking status: Former     Packs/day: 0.50     Years: 20.00     Pack years: 10.00     Types: Cigarettes     Quit date: 1999     Years since quittin.4     Passive exposure: Past    Smokeless tobacco: Never   Substance and Sexual Activity    Alcohol use: Yes     Alcohol/week: 7.0 standard drinks     Types: 7 Glasses of wine per week    Drug use: No    Sexual activity: Yes     Partners: Male       Family History:   Family History       Problem Relation (Age of Onset)    Alcohol abuse Father    Aneurysm Mother    Gout Brother    Heart disease Brother    Hypertension Mother, Father    Kidney disease Brother    No Known Problems Daughter, Daughter, Daughter            No current facility-administered medications on file prior to encounter.     Current Outpatient Medications on File Prior to Encounter   Medication Sig    allopurinoL (ZYLOPRIM) 100 MG tablet Take 2 tablets (200 mg total) by mouth once daily.    amLODIPine (NORVASC) 10 MG tablet Take 1 tablet (10 mg total) by mouth once daily.    apixaban (ELIQUIS) 2.5 mg Tab Take 1 tablet (2.5 mg total) by mouth 2 (two) times daily. End date July 3, 2023    atorvastatin (LIPITOR) 80 MG tablet TAKE 1 TABLET BY MOUTH EVERY DAY    calcium-vitamin D3 (OS-WHITNEY 500 + D3) 500 mg-5 mcg (200 unit)  per tablet Take 2 tablets by mouth once daily.    carvediloL (COREG) 12.5 MG tablet Take 1 tablet (12.5 mg total) by mouth 2 (two) times daily with meals.    cefadroxil (DURICEF) 1 gram tablet Take 1 tablet (1 g total) by mouth every 12 (twelve) hours. End date June 5, 2023 for 6 days    celecoxib (CELEBREX) 200 MG capsule Take 1 capsule (200 mg total) by mouth once daily.    fluticasone propionate (FLONASE) 50 mcg/actuation nasal spray SPRAY ONCE INTO EACH NOSTRIL ONCE DAILY    melatonin (MELATIN) 3 mg tablet Take 2 tablets (6 mg total) by mouth nightly as needed for Insomnia.    methocarbamoL (ROBAXIN) 500 MG Tab Take 1 tablet (500 mg total) by mouth 4 (four) times daily.    montelukast (SINGULAIR) 10 mg tablet Take 1 tablet (10 mg total) by mouth every evening.    oxyCODONE (ROXICODONE) 5 MG immediate release tablet Take 1 tablet (5 mg total) by mouth every 4 (four) hours as needed (pain scale 7-10).    pantoprazole (PROTONIX) 40 MG tablet TAKE 1 TABLET BY MOUTH EVERY DAY    polyethylene glycol (GLYCOLAX) 17 gram PwPk Take 17 g by mouth once daily.    pregabalin (LYRICA) 75 MG capsule Take 1 capsule (75 mg total) by mouth every evening.    vitamin D (VITAMIN D3) 50 mcg (2,000 unit) Tab Take 1 tablet (2,000 Units total) by mouth once daily.       Allergies:   Review of patient's allergies indicates:  No Known Allergies    ROS:  Review of Systems   Constitutional:  Negative for appetite change and fever.   Respiratory:  Negative for cough and shortness of breath.    Cardiovascular:  Negative for chest pain and palpitations.   Gastrointestinal:  Negative for abdominal pain, nausea and vomiting.   Genitourinary:  Negative for difficulty urinating and dysuria.   Musculoskeletal:  Negative for arthralgias and back pain.   Neurological:  Positive for weakness. Negative for dizziness and light-headedness.   Psychiatric/Behavioral:  Negative for sleep disturbance. The patient is not nervous/anxious.         Objective:  Temp:  [98.1 °F (36.7 °C)-98.4 °F (36.9 °C)]   Pulse:  [70-86]   Resp:  [17-18]   BP: ()/(54-64)   SpO2:  [90 %-99 %]     Body mass index is 23.8 kg/m².      Physical Exam  Vitals and nursing note reviewed.   Constitutional:       General: She is not in acute distress.     Appearance: She is well-developed.   Cardiovascular:      Rate and Rhythm: Normal rate and regular rhythm.      Heart sounds: S1 normal and S2 normal. No murmur heard.  Pulmonary:      Effort: Pulmonary effort is normal. No respiratory distress.      Breath sounds: Normal breath sounds. No wheezing or rales.   Abdominal:      General: Bowel sounds are normal. There is no distension.      Palpations: Abdomen is soft.      Tenderness: There is no abdominal tenderness.   Musculoskeletal:      Left upper leg: Swelling and tenderness present.      Right lower leg: No edema.      Left lower leg: No edema.   Skin:     General: Skin is warm and dry.      Findings: Bruising present.   Neurological:      Mental Status: She is alert and oriented to person, place, and time.   Psychiatric:         Mood and Affect: Mood normal.         Behavior: Behavior normal. Behavior is cooperative.         Thought Content: Thought content normal.       Significant Labs:   A1C:   Recent Labs   Lab 05/26/23  2155   HGBA1C 4.9     CBC:  Recent Labs   Lab 05/31/23 0417 06/01/23  0457   WBC 10.68 9.39   HGB 8.5* 8.3*   HCT 26.8* 26.9*    292   MCV 98 99*     BMP:  Recent Labs   Lab 05/31/23 0417 06/01/23  0457   GLU 90 74   * 135*   K 4.1 3.9   CL 99 104   CO2 22* 25   BUN 13 12   CREATININE 0.7 0.7   CALCIUM 8.4* 8.4*   MG 1.5* 1.8   PHOS 2.8 3.5       Significant Imaging: I have reviewed all pertinent imaging results/findings completed during prior hospitalization.      Assessment and Plan:  Active Diagnoses:    Diagnosis Date Noted POA    PRINCIPAL PROBLEM:  Left displaced femoral neck fracture [S72.002A] 05/27/2023 Yes    Hyponatremia  [E87.1] 06/02/2023 Yes    Malnutrition of mild degree [E44.1] 06/01/2023 Yes    Acute blood loss anemia [D62] 05/29/2023 Yes    Iron deficiency anemia secondary to blood loss (chronic) [D50.0] 04/17/2023 Yes    Localized primary osteoarthritis of right shoulder region [M19.011 (ICD-10-CM)] [M19.011] 02/15/2023 Yes    Allergic rhinitis [J30.9] 10/15/2018 Yes    Hypertension [I10]  Yes    Stage 3a chronic kidney disease [N18.31] 05/16/2017 Yes    Gastroesophageal reflux disease without esophagitis [K21.9] 03/20/2017 Yes    Coronary artery disease involving native coronary artery of native heart without angina pectoris [I25.10] 08/24/2016 Yes    Dyslipidemia [E78.5] 08/24/2016 Yes      Problems Resolved During this Admission:       Left displaced femoral neck fracture  S/p left total hip arthroplasty on 05/28  - PT/OT, WBAT, posterior precautions x6  - DVT PPX with Eliquis 215 mg BID times 35 days, end 7/3  - 1 week postop PPX antibiotics with cefadroxil 1 g BID, end 6/5  - maintain surgical dressing until removed by Orthopedics  - ortho PALMER to see weekly while at SNF  - follow-up with orthopedics after discharge  - Continue hydrocodone 5/325 to 10/325 mg q.4 hours PRN, Celebrex 200 mg daily, methocarbamol 400 mg q6 hrs, Lyrica 75 mg qHS.     Acute blood loss anemia  Iron deficiency anemia secondary to blood loss (chronic)  - On IV iron infusions at home with recent severe nose bleed earlier this year as contributor to chronic losses  - continue monitor twice weekly CBCs   - Resume outpatient management upon discharge.     Essential Hypertension  - continue home amlodipine 10 mg daily and carvedilol 12.5 mg BID  - Holding HCTZ in setting of hypokalemia and hypomagnesemia      CKD stage 3  - continue to enter twice weekly BMPs  - avoid nephrotoxic agents  - renally dose medications when appropriate     Gastroesophageal reflux disease without esophagitis  - continue home PPI      Dyslipidemia  - continue atorvastatin 80  mg daily     Coronary artery disease involving native coronary artery of native heart without angina pectoris  - continue carvedilol and atorvastatin      Primary osteoarthritis involving multiple joints  - follows with Rheumatology  - Continue acetaminophen PRN     Gout  - continue allopurinol 200 mg daily     Seasonal allergies  - continue fluticasone spray and Singulair 10 mg qHS.     Debility   - Continue with PT/OT for gait training and strengthening and restoration of ADL's   - Encourage mobility, OOB in chair, and early ambulation as appropriate  - Fall precautions   - Monitor for bowel and bladder dysfunction  - Monitor for and prevent skin breakdown and pressure ulcers  - Continue DVT prophylaxis with apixaban 2.5 mg BID       Anticipated Disposition:  Home with home health      Future Appointments   Date Time Provider Department Center   6/5/2023  8:30 AM NON-CHEMO [address] [place] [address] CHEMO [facility]   6/12/2023 11:00 AM [place] [facility] [address] [facility]   7/10/2023 10:45 AM [place] [facility]   8/7/2023 10:30 AM Armani Cai PA-C [facility]   8/16/2023 11:00 AM LAB, [facility]   8/18/2023  1:30 PM Anh Martinez MD [facility] HEM ONC [facility]   8/22/2023 10:30 AM Juan Naranjo MD [id number] [place]   3/5/2024  9:00 AM Michael Lal MD [facility] [id number] [facility]       I certify that SNF services are required to be given on an inpatient basis because Jessica Floyd needs for skilled nursing care and/or skilled rehabilitation are required on a daily basis and such services can only practically be provided in a skilled nursing facility setting and are for an ongoing condition for which she received inpatient care in the hospital.       Guille Ulrich MD  Department of Hospital Medicine   [facility] - Skilled Nursing

## 2023-06-03 PROCEDURE — 97116 GAIT TRAINING THERAPY: CPT | Mod: CQ

## 2023-06-03 PROCEDURE — 25000003 PHARM REV CODE 250: Performed by: NURSE PRACTITIONER

## 2023-06-03 PROCEDURE — 97110 THERAPEUTIC EXERCISES: CPT | Mod: CQ

## 2023-06-03 PROCEDURE — 97530 THERAPEUTIC ACTIVITIES: CPT | Mod: CQ

## 2023-06-03 PROCEDURE — 25000003 PHARM REV CODE 250: Performed by: HOSPITALIST

## 2023-06-03 PROCEDURE — 11000004 HC SNF PRIVATE

## 2023-06-03 RX ADMIN — HYDROCODONE BITARTRATE AND ACETAMINOPHEN 1 TABLET: 10; 325 TABLET ORAL at 10:06

## 2023-06-03 RX ADMIN — CHOLECALCIFEROL TAB 25 MCG (1000 UNIT) 2000 UNITS: 25 TAB at 09:06

## 2023-06-03 RX ADMIN — CARVEDILOL 6.25 MG: 3.12 TABLET, FILM COATED ORAL at 05:06

## 2023-06-03 RX ADMIN — APIXABAN 2.5 MG: 2.5 TABLET, FILM COATED ORAL at 08:06

## 2023-06-03 RX ADMIN — METHOCARBAMOL 500 MG: 500 TABLET ORAL at 09:06

## 2023-06-03 RX ADMIN — METHOCARBAMOL 500 MG: 500 TABLET ORAL at 05:06

## 2023-06-03 RX ADMIN — CEFADROXIL 1 G: 1000 TABLET ORAL at 09:06

## 2023-06-03 RX ADMIN — ALLOPURINOL 200 MG: 100 TABLET ORAL at 09:06

## 2023-06-03 RX ADMIN — CELECOXIB 200 MG: 200 CAPSULE ORAL at 09:06

## 2023-06-03 RX ADMIN — METHOCARBAMOL 500 MG: 500 TABLET ORAL at 12:06

## 2023-06-03 RX ADMIN — PREGABALIN 75 MG: 75 CAPSULE ORAL at 08:06

## 2023-06-03 RX ADMIN — APIXABAN 2.5 MG: 2.5 TABLET, FILM COATED ORAL at 09:06

## 2023-06-03 RX ADMIN — FLUTICASONE PROPIONATE 50 MCG: 50 SPRAY, METERED NASAL at 09:06

## 2023-06-03 RX ADMIN — METHOCARBAMOL 500 MG: 500 TABLET ORAL at 08:06

## 2023-06-03 RX ADMIN — CEFADROXIL 1 G: 1000 TABLET ORAL at 08:06

## 2023-06-03 RX ADMIN — CARVEDILOL 6.25 MG: 3.12 TABLET, FILM COATED ORAL at 09:06

## 2023-06-03 RX ADMIN — HYDROCODONE BITARTRATE AND ACETAMINOPHEN 1 TABLET: 10; 325 TABLET ORAL at 02:06

## 2023-06-03 RX ADMIN — ATORVASTATIN CALCIUM 80 MG: 40 TABLET, FILM COATED ORAL at 09:06

## 2023-06-03 RX ADMIN — PANTOPRAZOLE SODIUM 40 MG: 40 TABLET, DELAYED RELEASE ORAL at 09:06

## 2023-06-03 RX ADMIN — SENNOSIDES AND DOCUSATE SODIUM 1 TABLET: 50; 8.6 TABLET ORAL at 09:06

## 2023-06-03 RX ADMIN — MONTELUKAST 10 MG: 10 TABLET, FILM COATED ORAL at 08:06

## 2023-06-03 RX ADMIN — AMLODIPINE BESYLATE 5 MG: 5 TABLET ORAL at 09:06

## 2023-06-03 RX ADMIN — Medication 2 TABLET: at 09:06

## 2023-06-03 NOTE — PT/OT/SLP PROGRESS
"Physical Therapy Treatment    Patient Name:  Jessica Floyd   MRN:  [id number]  Admit Date: 5/31/2023  Admitting Diagnosis: Left displaced femoral neck fracture  Recent Surgeries:     General Precautions: Standard, fall, vision impaired (pt with L vision field cut from a previous aneurysm)  Orthopedic Precautions: LLE weight bearing as tolerated, LLE posterior precautions  Braces: N/A    Recommendations:     Discharge Recommendations: home health PT  Level of Assistance Recommended at Discharge: Intermittent assistance   Discharge Equipment Recommendations: bedside commode, walker, rolling  Barriers to discharge: Inaccessible home environment, Decreased caregiver support    Assessment:     Jessica Floyd is a 72 y.o. female admitted with a medical diagnosis of Left displaced femoral neck fracture . Pt tolerated well, pt would continue to benefit from skilled PT services to improve overall functional mobility, strength and endurance.  .      Performance deficits affecting function: weakness, impaired endurance, impaired self care skills, impaired functional mobility, gait instability, impaired balance, decreased upper extremity function, decreased lower extremity function, decreased ROM, impaired skin, edema, impaired cardiopulmonary response to activity, orthopedic precautions.    Rehab Potential is good    Activity Tolerance: Fair    Plan:     Patient to be seen 5 x/week to address the above listed problems via gait training, therapeutic activities, therapeutic exercises, wheelchair management/training    Plan of Care Expires: 07/01/23  Plan of Care Reviewed with: patient    Subjective     "Need to use the toilet before I start".     Pain/Comfort:  Pain Rating 1: 5/10  Location - Side 1: Left  Location - Orientation 1: generalized  Location 1: hip  Pain Addressed 1: Pre-medicate for activity, Reposition, Distraction, Cessation of Activity  Pain Rating Post-Intervention 1: 5/10 (o further mentioned)    Patient's cultural, " spiritual, Christianity conflicts given the current situation:  no    Objective:       Patient found with  (in WC) upon PT entry to room.     Therapeutic Activities and Exercises: 2x10 reps AP,GS, LAQ    Functional Mobility:  Transfers:     Sit to Stand:  stand by assistance with rolling walker  Toilet Transfer: stand by assistance with  rolling walker  using  Stand Pivot  SBA with all aspect of toileting  Gait: amb with RW SBA ~ 10 ft in room x 2 WC<>Bathroom amb in gym with RW  ft no LOB occ vcs for maintaining hip prec when negotiating turns no LOB  Stairs:  asc/toro 4 steps BHR x 3 trials SBA    AM-PAC 6 CLICK MOBILITY  18    Patient left up in chair with call button in reach and belonging sin reach .    GOALS:   Multidisciplinary Problems       Physical Therapy Goals          Problem: Physical Therapy    Goal Priority Disciplines Outcome Goal Variances Interventions   Physical Therapy Goal     PT, PT/OT Ongoing, Progressing     Description: Goals to be met by: 7/1/23     Patient will increase functional independence with mobility by performing:    . Supine to sit with Set-up [place]  . Sit to supine with Set-up [place]  . Rolling to Left and Right with Set-up Assistance.  . Sit to stand transfer with Supervision  . Bed to chair transfer with Supervision using Rolling Walker/ LRAD  . Gait  x 150 feet with Supervision using Rolling Walker/LRAD  . Wheelchair propulsion x150 feet with Contact Guard Assistance using bilateral uppper extremities  . Ascend/descend 12 stair with bilateral Handrails Stand-by Assistance using No Assistive Device.   . Ascend/Descend 4 inch curb step with Stand-by Assistance using Rolling Walker/ LRAD.                         Time Tracking:     PT Received On: 06/03/23  PT Start Time: 1049  PT Stop Time: 1128  PT Total Time (min): 39 min    Billable Minutes: Gait Training 14, Therapeutic Activity 15, and Therapeutic Exercise 10    Treatment Type: Treatment  PT/PTA: PTA      Number of PTA visits since last PT visit: 2     06/03/2023

## 2023-06-04 ENCOUNTER — PATIENT MESSAGE (OUTPATIENT)
Dept: RHEUMATOLOGY | Facility: CLINIC | Age: 73
End: 2023-06-04
Payer: MEDICARE

## 2023-06-04 PROCEDURE — 97530 THERAPEUTIC ACTIVITIES: CPT | Mod: CO

## 2023-06-04 PROCEDURE — 11000004 HC SNF PRIVATE

## 2023-06-04 PROCEDURE — 25000003 PHARM REV CODE 250: Performed by: NURSE PRACTITIONER

## 2023-06-04 PROCEDURE — 97535 SELF CARE MNGMENT TRAINING: CPT | Mod: CO

## 2023-06-04 PROCEDURE — 25000003 PHARM REV CODE 250: Performed by: HOSPITALIST

## 2023-06-04 RX ADMIN — HYDROCODONE BITARTRATE AND ACETAMINOPHEN 1 TABLET: 5; 325 TABLET ORAL at 12:06

## 2023-06-04 RX ADMIN — APIXABAN 2.5 MG: 2.5 TABLET, FILM COATED ORAL at 09:06

## 2023-06-04 RX ADMIN — SENNOSIDES AND DOCUSATE SODIUM 1 TABLET: 50; 8.6 TABLET ORAL at 08:06

## 2023-06-04 RX ADMIN — Medication 2 TABLET: at 08:06

## 2023-06-04 RX ADMIN — METHOCARBAMOL 500 MG: 500 TABLET ORAL at 12:06

## 2023-06-04 RX ADMIN — ATORVASTATIN CALCIUM 80 MG: 40 TABLET, FILM COATED ORAL at 08:06

## 2023-06-04 RX ADMIN — CARVEDILOL 6.25 MG: 3.12 TABLET, FILM COATED ORAL at 05:06

## 2023-06-04 RX ADMIN — METHOCARBAMOL 500 MG: 500 TABLET ORAL at 05:06

## 2023-06-04 RX ADMIN — APIXABAN 2.5 MG: 2.5 TABLET, FILM COATED ORAL at 08:06

## 2023-06-04 RX ADMIN — MONTELUKAST 10 MG: 10 TABLET, FILM COATED ORAL at 09:06

## 2023-06-04 RX ADMIN — PREGABALIN 75 MG: 75 CAPSULE ORAL at 09:06

## 2023-06-04 RX ADMIN — CEFADROXIL 1 G: 1000 TABLET ORAL at 10:06

## 2023-06-04 RX ADMIN — METHOCARBAMOL 500 MG: 500 TABLET ORAL at 08:06

## 2023-06-04 RX ADMIN — PANTOPRAZOLE SODIUM 40 MG: 40 TABLET, DELAYED RELEASE ORAL at 08:06

## 2023-06-04 RX ADMIN — FLUTICASONE PROPIONATE 50 MCG: 50 SPRAY, METERED NASAL at 08:06

## 2023-06-04 RX ADMIN — SENNOSIDES AND DOCUSATE SODIUM 1 TABLET: 50; 8.6 TABLET ORAL at 09:06

## 2023-06-04 RX ADMIN — CHOLECALCIFEROL TAB 25 MCG (1000 UNIT) 2000 UNITS: 25 TAB at 08:06

## 2023-06-04 RX ADMIN — ALLOPURINOL 200 MG: 100 TABLET ORAL at 08:06

## 2023-06-04 RX ADMIN — METHOCARBAMOL 500 MG: 500 TABLET ORAL at 09:06

## 2023-06-04 RX ADMIN — CARVEDILOL 6.25 MG: 3.12 TABLET, FILM COATED ORAL at 08:06

## 2023-06-04 RX ADMIN — AMLODIPINE BESYLATE 5 MG: 5 TABLET ORAL at 08:06

## 2023-06-04 RX ADMIN — CELECOXIB 200 MG: 200 CAPSULE ORAL at 08:06

## 2023-06-04 RX ADMIN — CEFADROXIL 1 G: 1000 TABLET ORAL at 08:06

## 2023-06-04 NOTE — PT/OT/SLP PROGRESS
Occupational Therapy   Treatment    Name: Jessica Floyd  MRN: [id number]  Admit Date: 5/31/2023  Admitting Diagnosis:  Left displaced femoral neck fracture    General Precautions: Standard, fall, vision impaired   Orthopedic Precautions: LLE weight bearing as tolerated, LLE posterior precautions   Braces: N/A    Recommendations:     Discharge Recommendations:  home with home health  Level of Assistance Recommended at Discharge: 24 hours physical assistance for all ADL's and home management tasks  Discharge Equipment Recommendations: bedside commode, walker, rolling  Barriers to discharge:  Inaccessible home environment, Decreased caregiver support    Assessment:     Jessica Floyd is a 72 y.o. female with a medical diagnosis of Left displaced femoral neck fracture.  She presents with performance deficits affecting function are weakness, impaired endurance, impaired self care skills, impaired functional mobility, gait instability, impaired balance, decreased upper extremity function, decreased lower extremity function, decreased ROM, impaired skin, edema, impaired cardiopulmonary response to activity, orthopedic precautions.  Pt tolerated session well and without incident, but she continues to require assistance to perform self-care tasks and mobility.  She would continue to benefit from skilled OT services at SNF to maximize her gains in functional independence.      Rehab Potential is good    Activity tolerance:  Good    Plan:     Patient to be seen 5 x/week to address the above listed problems via self-care/home management, therapeutic activities, therapeutic exercises    Plan of Care Expires: 06/15/23  Plan of Care Reviewed with: patient    Subjective     Communicated with: pt agreeable to session. .    Pain/Comfort:  Pain Rating 1: 0/10    Patient's cultural, spiritual, Orthodox conflicts given the current situation:  no    Objective:     Patient found HOB elevated with   upon OT entry to room.    Bed Mobility:     Patient completed Rolling/Turning to Left with  stand by assistance  Patient completed Rolling/Turning to Right with stand by assistance  Patient completed Scooting/Bridging with stand by assistance  Patient completed Supine to Sit with stand by assistance  Patient completed Sit to Supine with stand by assistance     Functional Mobility/Transfers:  Patient completed Sit <> Stand Transfer with stand by assistance  with  rolling walker   Patient completed Bed <> Chair Transfer using Stand Pivot technique with stand by assistance with rolling walker  Patient completed Toilet Transfer Stand Pivot technique with stand by assistance with  grab bars    Activities of Daily Living:  Bathing: pt performed bathing seated in w/c at sink with minimum assistance for reaching legs and back  Upper Body Dressing: pt donned shirt with stand by assistance seated in chair  Lower Body Dressing: pt donned adult brief with minimum assistance   Toileting: pt performed toileting with SBA in stance using grab bar    [facility] 6 Click ADL: 20    Treatment & Education:  Hip precautions, ADLs, functional mobility, safety awareness and OT roles/goals    Patient left HOB elevated with call button in reach    GOALS:   Multidisciplinary Problems       Occupational Therapy Goals          Problem: Occupational Therapy    Goal Priority Disciplines Outcome Interventions   Occupational Therapy Goal     OT, PT/OT Ongoing, Progressing    Description: Goals to be met by: 6/20/23     Patient will increase functional independence with ADLs by performing:    UE Dressing with [place].  LE Dressing with [place].  Grooming while standing with [place].  Toileting from toilet with [place] for hygiene and clothing management.   Bathing from  sitting at sink with [place].  Toilet transfer to toilet with Modified [place].                         Time Tracking:     OT Date of Treatment: 06/04/23  OT Start Time: 0957    OT Stop Time:  1039  OT Total Time (min): 42 min    Billable Minutes:Self Care/Home Management 25  Therapeutic Activity 17    6/4/2023

## 2023-06-05 ENCOUNTER — PATIENT MESSAGE (OUTPATIENT)
Dept: HEMATOLOGY/ONCOLOGY | Facility: CLINIC | Age: 73
End: 2023-06-05
Payer: MEDICARE

## 2023-06-05 ENCOUNTER — PATIENT MESSAGE (OUTPATIENT)
Dept: SPORTS MEDICINE | Facility: CLINIC | Age: 73
End: 2023-06-05
Payer: MEDICARE

## 2023-06-05 LAB
ALBUMIN SERPL BCP-MCNC: 2.5 G/DL (ref 3.5–5.2)
ALP SERPL-CCNC: 54 U/L (ref 55–135)
ALT SERPL W/O P-5'-P-CCNC: 22 U/L (ref 10–44)
ANION GAP SERPL CALC-SCNC: 8 MMOL/L (ref 8–16)
AST SERPL-CCNC: 40 U/L (ref 10–40)
BASOPHILS # BLD AUTO: 0.13 K/UL (ref 0–0.2)
BASOPHILS NFR BLD: 1.4 % (ref 0–1.9)
BILIRUB SERPL-MCNC: 0.6 MG/DL (ref 0.1–1)
BUN SERPL-MCNC: 19 MG/DL (ref 8–23)
CALCIUM SERPL-MCNC: 8.9 MG/DL (ref 8.7–10.5)
CHLORIDE SERPL-SCNC: 103 MMOL/L (ref 95–110)
CO2 SERPL-SCNC: 25 MMOL/L (ref 23–29)
CREAT SERPL-MCNC: 0.7 MG/DL (ref 0.5–1.4)
DIFFERENTIAL METHOD: ABNORMAL
EOSINOPHIL # BLD AUTO: 0.4 K/UL (ref 0–0.5)
EOSINOPHIL NFR BLD: 3.9 % (ref 0–8)
ERYTHROCYTE [DISTWIDTH] IN BLOOD BY AUTOMATED COUNT: 23.9 % (ref 11.5–14.5)
EST. GFR  (NO RACE VARIABLE): >60 ML/MIN/1.73 M^2
GLUCOSE SERPL-MCNC: 79 MG/DL (ref 70–110)
HCT VFR BLD AUTO: 28.2 % (ref 37–48.5)
HGB BLD-MCNC: 8.5 G/DL (ref 12–16)
IMM GRANULOCYTES # BLD AUTO: 0.1 K/UL (ref 0–0.04)
IMM GRANULOCYTES NFR BLD AUTO: 1.1 % (ref 0–0.5)
LYMPHOCYTES # BLD AUTO: 1.7 K/UL (ref 1–4.8)
LYMPHOCYTES NFR BLD: 18.4 % (ref 18–48)
MAGNESIUM SERPL-MCNC: 1.6 MG/DL (ref 1.6–2.6)
MCH RBC QN AUTO: 30 PG (ref 27–31)
MCHC RBC AUTO-ENTMCNC: 30.1 G/DL (ref 32–36)
MCV RBC AUTO: 100 FL (ref 82–98)
MONOCYTES # BLD AUTO: 2.2 K/UL (ref 0.3–1)
MONOCYTES NFR BLD: 23.6 % (ref 4–15)
NEUTROPHILS # BLD AUTO: 4.9 K/UL (ref 1.8–7.7)
NEUTROPHILS NFR BLD: 51.6 % (ref 38–73)
NRBC BLD-RTO: 0 /100 WBC
PHOSPHATE SERPL-MCNC: 4.2 MG/DL (ref 2.7–4.5)
PLATELET # BLD AUTO: 360 K/UL (ref 150–450)
PLATELET BLD QL SMEAR: ABNORMAL
PMV BLD AUTO: 10.7 FL (ref 9.2–12.9)
POTASSIUM SERPL-SCNC: 3.9 MMOL/L (ref 3.5–5.1)
PROT SERPL-MCNC: 5.4 G/DL (ref 6–8.4)
RBC # BLD AUTO: 2.83 M/UL (ref 4–5.4)
SODIUM SERPL-SCNC: 136 MMOL/L (ref 136–145)
WBC # BLD AUTO: 9.42 K/UL (ref 3.9–12.7)

## 2023-06-05 PROCEDURE — 97110 THERAPEUTIC EXERCISES: CPT | Mod: CQ

## 2023-06-05 PROCEDURE — 25000003 PHARM REV CODE 250: Performed by: NURSE PRACTITIONER

## 2023-06-05 PROCEDURE — 97530 THERAPEUTIC ACTIVITIES: CPT | Mod: CO

## 2023-06-05 PROCEDURE — 84100 ASSAY OF PHOSPHORUS: CPT | Performed by: HOSPITALIST

## 2023-06-05 PROCEDURE — 83735 ASSAY OF MAGNESIUM: CPT | Performed by: HOSPITALIST

## 2023-06-05 PROCEDURE — 97110 THERAPEUTIC EXERCISES: CPT | Mod: CO

## 2023-06-05 PROCEDURE — 80053 COMPREHEN METABOLIC PANEL: CPT | Performed by: HOSPITALIST

## 2023-06-05 PROCEDURE — 36415 COLL VENOUS BLD VENIPUNCTURE: CPT | Performed by: HOSPITALIST

## 2023-06-05 PROCEDURE — 85025 COMPLETE CBC W/AUTO DIFF WBC: CPT | Performed by: HOSPITALIST

## 2023-06-05 PROCEDURE — 97530 THERAPEUTIC ACTIVITIES: CPT | Mod: CQ

## 2023-06-05 PROCEDURE — 11000004 HC SNF PRIVATE

## 2023-06-05 PROCEDURE — 25000003 PHARM REV CODE 250: Performed by: HOSPITALIST

## 2023-06-05 PROCEDURE — 97116 GAIT TRAINING THERAPY: CPT | Mod: CQ

## 2023-06-05 RX ADMIN — ATORVASTATIN CALCIUM 80 MG: 40 TABLET, FILM COATED ORAL at 08:06

## 2023-06-05 RX ADMIN — ACETAMINOPHEN 650 MG: 325 TABLET ORAL at 04:06

## 2023-06-05 RX ADMIN — SENNOSIDES AND DOCUSATE SODIUM 1 TABLET: 50; 8.6 TABLET ORAL at 08:06

## 2023-06-05 RX ADMIN — METHOCARBAMOL 500 MG: 500 TABLET ORAL at 01:06

## 2023-06-05 RX ADMIN — METHOCARBAMOL 500 MG: 500 TABLET ORAL at 08:06

## 2023-06-05 RX ADMIN — APIXABAN 2.5 MG: 2.5 TABLET, FILM COATED ORAL at 08:06

## 2023-06-05 RX ADMIN — CARVEDILOL 6.25 MG: 3.12 TABLET, FILM COATED ORAL at 04:06

## 2023-06-05 RX ADMIN — HYDROCODONE BITARTRATE AND ACETAMINOPHEN 1 TABLET: 5; 325 TABLET ORAL at 09:06

## 2023-06-05 RX ADMIN — METHOCARBAMOL 500 MG: 500 TABLET ORAL at 05:06

## 2023-06-05 RX ADMIN — ALLOPURINOL 200 MG: 100 TABLET ORAL at 08:06

## 2023-06-05 RX ADMIN — Medication 2 TABLET: at 08:06

## 2023-06-05 RX ADMIN — CELECOXIB 200 MG: 200 CAPSULE ORAL at 08:06

## 2023-06-05 RX ADMIN — FLUTICASONE PROPIONATE 50 MCG: 50 SPRAY, METERED NASAL at 08:06

## 2023-06-05 RX ADMIN — CHOLECALCIFEROL TAB 25 MCG (1000 UNIT) 2000 UNITS: 25 TAB at 08:06

## 2023-06-05 RX ADMIN — PREGABALIN 75 MG: 75 CAPSULE ORAL at 08:06

## 2023-06-05 RX ADMIN — CEFADROXIL 1 G: 1000 TABLET ORAL at 08:06

## 2023-06-05 RX ADMIN — PANTOPRAZOLE SODIUM 40 MG: 40 TABLET, DELAYED RELEASE ORAL at 08:06

## 2023-06-05 RX ADMIN — CARVEDILOL 6.25 MG: 3.12 TABLET, FILM COATED ORAL at 08:06

## 2023-06-05 RX ADMIN — MONTELUKAST 10 MG: 10 TABLET, FILM COATED ORAL at 08:06

## 2023-06-05 RX ADMIN — AMLODIPINE BESYLATE 5 MG: 5 TABLET ORAL at 08:06

## 2023-06-05 NOTE — PT/OT/SLP PROGRESS
"Physical Therapy Treatment    Patient Name:  Jessica Floyd   MRN:  [id number]  Admit Date: 5/31/2023  Admitting Diagnosis: Left displaced femoral neck fracture  Recent Surgeries:     General Precautions: Standard, fall, vision impaired (pt with L vision field cut from a previous aneurysm)  Orthopedic Precautions: LLE weight bearing as tolerated, LLE posterior precautions  Braces: N/A    Recommendations:     Discharge Recommendations: home health PT  Level of Assistance Recommended at Discharge: Intermittent assistance   Discharge Equipment Recommendations: bedside commode, walker, rolling  Barriers to discharge: Inaccessible home environment, Decreased caregiver support    Assessment:     Jessica Floyd is a 72 y.o. female admitted with a medical diagnosis of Left displaced femoral neck fracture . Pt tolerated well, pt progressing well, highly motivated, pt would continue to benefit from skilled PT services to improve overall functional mobility, strength and endurance.  .      Performance deficits affecting function: weakness, impaired endurance, impaired self care skills, impaired functional mobility, gait instability, impaired balance, decreased upper extremity function, decreased lower extremity function, decreased ROM, impaired skin, edema, impaired cardiopulmonary response to activity, orthopedic precautions.    Rehab Potential is good    Activity Tolerance: Fair    Plan:     Patient to be seen 5 x/week to address the above listed problems via gait training, therapeutic activities, therapeutic exercises, wheelchair management/training    Plan of Care Expires: 07/01/23  Plan of Care Reviewed with: patient    Subjective     I'm alright".     Pain/Comfort:  Pain Rating 1:  ("uncomfortable, not really pain")  Location - Side 1: Left  Location - Orientation 1: generalized  Location 1: hip  Pain Addressed 1: Pre-medicate for activity, Reposition, Distraction  Pain Rating Post-Intervention 1:  (no further " "mentioned)    Patient's cultural, spiritual, Gnosticism conflicts given the current situation:  no    Objective:       Patient found with  (in wc) upon PT entry to room.     Therapeutic Activities and Exercises: 2x10 reps AP,GS,QS,LAQ    Functional Mobility:  Bed Mobility:     Supine to Sit: supervision and on mat with wedge  Sit to Supine: supervision and on mat with wedge  Transfers:     Sit to Stand:  supervision and stand by assistance with rolling walker  WC to EOM Transfer: stand by assistance with  rolling walker  using  Stand Pivot ~ 10 ft and wc to BSC post session  Gait: amb with RW SBA ~ 150 ft no LOB  Stairs:  asc/toro 4 step with BHR x 3 trials vcs for safety/tech especially maintaining hip prec with turning  Curb asc/toro 4" curb with RW SBA  Balance pt using reacher in RUE picking up 3 items off the floor with RW vcs for safety, not reaching out of ASHLEY  Wheelchair Propulsion: ~150 ft with BUE S    AM-PAC 6 CLICK MOBILITY  18    Patient left up in chair with call button in reach and belonging sin reach .    GOALS:   Multidisciplinary Problems       Physical Therapy Goals          Problem: Physical Therapy    Goal Priority Disciplines Outcome Goal Variances Interventions   Physical Therapy Goal     PT, PT/OT Ongoing, Progressing     Description: Goals to be met by: 7/1/23     Patient will increase functional independence with mobility by performing:    . Supine to sit with Set-up [place]  . Sit to supine with Set-up [place]  . Rolling to Left and Right with Set-up Assistance.  . Sit to stand transfer with Supervision  . Bed to chair transfer with Supervision using Rolling Walker/ LRAD  . Gait  x 150 feet with Supervision using Rolling Walker/LRAD  . Wheelchair propulsion x150 feet with Contact Guard Assistance using bilateral uppper extremities  . Ascend/descend 12 stair with bilateral Handrails Stand-by Assistance using No Assistive Device. met  . Ascend/Descend 4 inch curb step with Stand-by " Assistance using Rolling Walker/ LRAD. met                         Time Tracking:     PT Received On: 06/05/23  PT Start Time: 1444  PT Stop Time: 1525  PT Total Time (min): 41 min    Billable Minutes: Gait Training 15, Therapeutic Activity 15, and Therapeutic Exercise 11    Treatment Type: Treatment  PT/PTA: PTA     Number of PTA visits since last PT visit: 3     06/05/2023

## 2023-06-05 NOTE — PLAN OF CARE
Family Training     Patient Name:  Jessica Floyd   MRN:  [id number]  Admit Date: 5/31/2023      Rehab tech called to schedule family training this date. Pt's family/caregiver agreeable to attend training Thursday, 6/8 at 9am with  and daughter.     6/5/2023

## 2023-06-05 NOTE — PT/OT/SLP PROGRESS
"Occupational Therapy   Treatment    Name: Jessica Floyd  MRN: [id number]  Admit Date: 5/31/2023  Admitting Diagnosis:  Left displaced femoral neck fracture    General Precautions: Standard, fall, vision impaired   Orthopedic Precautions: LLE weight bearing as tolerated, LLE posterior precautions   Braces: N/A    Recommendations:     Discharge Recommendations:  home with home health  Level of Assistance Recommended at Discharge: 24 hours physical assistance for all ADL's and home management tasks  Discharge Equipment Recommendations: bedside commode, walker, rolling  Barriers to discharge:  Inaccessible home environment, Decreased caregiver support    Assessment:     Jessica Floyd is a 72 y.o. female with a medical diagnosis of Left displaced femoral neck fracture .  She presents with performance deficits affecting function are weakness, impaired endurance, impaired self care skills, impaired functional mobility, gait instability, impaired balance, decreased upper extremity function, decreased lower extremity function, decreased ROM, impaired skin, edema, impaired cardiopulmonary response to activity, orthopedic precautions.     Pt participated well during today's session. Pt tolerated tx session without incident and is making progress, however, continues to demonstrate deficits with self care skills, balance, functional mobility, UB strength and endurance. Pt will benefit from continued OT services to progress towards goals.     Rehab Potential is good    Activity tolerance:  Good    Plan:     Patient to be seen 5 x/week to address the above listed problems via self-care/home management, therapeutic activities, therapeutic exercises    Plan of Care Expires: 06/15/23  Plan of Care Reviewed with: patient    Subjective   "I'm ready to go, feeling good"  Communicated with: Yony prior to session.  .    Pain/Comfort:  Pain Rating 1: 1/10  Location - Side 1: Left  Location - Orientation 1: generalized  Location 1: hip  Pain " Addressed 1: Pre-medicate for activity, Reposition, Distraction    Patient's cultural, spiritual, Jewish conflicts given the current situation:  no    Objective:     Patient found supine upon OT entry to room.    Bed Mobility:    Patient completed Rolling/Turning to Right with modified independence  Patient completed Scooting/Bridging with modified independence  Patient completed Supine to Sit with modified independence     Functional Mobility/Transfers:  Patient completed Sit <> Stand Transfer x 2 with stand by assistance  with  rolling walker   Patient completed Bed <> Chair Transfer using Step Transfer technique with stand by assistance with rolling walker  Patient completed Wheelchair <> Bedside Chair Step Transfer technique with stand by assistance with rolling walker    Activities of Daily Living:  Not performed, already completed.     [facility] 6 Click ADL: 20    OT Exercises: UE Ergometer 12 min with moderate resistance     Therex performed to improve overall endurance, ROM and UB strength required for functional transfers, ADL's and w/c propulsion.     Treatment & Education:  Pt educated on:  - role of OT  - level of assistance  - energy conservation and task modification to maximized independence with ADL's and mobility   -  safety while performing functional transfers and self care tasks  - Orthopedic precautions and importance to adhering   - progress towards OT goals     Patient left up in chair with call button in reach    GOALS:   Multidisciplinary Problems       Occupational Therapy Goals          Problem: Occupational Therapy    Goal Priority Disciplines Outcome Interventions   Occupational Therapy Goal     OT, PT/OT Ongoing, Progressing    Description: Goals to be met by: 6/20/23     Patient will increase functional independence with ADLs by performing:    UE Dressing with [place].  LE Dressing with [place].  Grooming while standing with [place].  Toileting from toilet with  [place] for hygiene and clothing management.   Bathing from  sitting at sink with [place].  Toilet transfer to toilet with Modified [place].                         Time Tracking:     OT Date of Treatment: 06/05/23  OT Start Time: 1027    OT Stop Time: 1206  OT Total Time (min): 38 min    Billable Minutes:Therapeutic Activity 24  Therapeutic Exercise 14    6/5/2023

## 2023-06-05 NOTE — PROGRESS NOTES
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 Wan.  Admission to SNF for secondary weakness and debility.    Patient will be treated at [facility] with PT and OT to improve functional status and ability to perform ADLs.     Interval history:  24 hr vital sign ranges listed below. Labs reviewed and listed below. Patient denies shortness of breath, abdominal discomfort, nausea, or vomiting.  Patient reports an adequate appetite. Alb 2.5, dietary on case. Initiate boost supplement and proteinx daily. Patient denies dysuria.  Patient reports having regular bowel movements last 6/4.  Patient progessing with PT/OT-  Gait:  amb with RW SBA ~ 10 ft in room x 2 WC<>Bathroom amb in gym with RW  ft no LOB occ vcs for maintaining hip prec when negotiating turns no LOB. Continuing to follow and treat all acute and chronic conditions.      Allergies: Patient has No Known Allergies.    ROS  Constitutional: Negative for fever   Eyes: Negative for blurred vision, double vision   Respiratory: Negative for cough, shortness of breath   Cardiovascular: Negative for chest pain, palpitations.  +mild leg swelling.   Gastrointestinal: Negative for abdominal pain, constipation, diarrhea, nausea, vomiting.   Genitourinary: Negative for dysuria, frequency   Musculoskeletal:  + generalized  weakness.    Skin: Negative for itching and rash.   Neurological: Negative for dizziness, headaches.   Psychiatric/Behavioral: Negative for depression. The patient is not nervous/anxious.      24 hour Vital Sign Range   Temp:  [97.3 °F (36.3 °C)-97.8 °F (36.6 °C)]   Pulse:  [79-87]   Resp:  [16-17]   BP: (141)/(70-77)   SpO2:  [95 %-96 %]     Current BMI: Body mass index is 23.8 kg/m².    PEx  Constitutional: Patient appears debilitated.  No distress noted  HENT:   Head: Normocephalic and atraumatic.   Eyes: Pupils are equal, round  Neck: Normal range of motion. Neck supple.   Cardiovascular: Normal rate, regular rhythm and normal heart sounds.    Pulmonary/Chest: Effort normal and breath sounds are clear  Abdominal: Soft. Bowel sounds are normal.   Musculoskeletal: Normal range of motion.   Neurological: Alert and oriented to person, place, and time.   Psychiatric: Normal mood and affect. Behavior is normal.   Skin: Skin is warm and dry. Surgical dressing to LLE, only to removed by Ortho    Recent Labs   Lab 06/05/23  0547      K 3.9      CO2 25   BUN 19   CREATININE 0.7   MG 1.6       Recent Labs   Lab 06/05/23  0547   WBC 9.42   RBC 2.83*   HGB 8.5*   HCT 28.2*      *   MCH 30.0   MCHC 30.1*             Incision/Site 05/28/23 0854 Left Hip (Active)   05/28/23 0854   Present Prior to Hospital Arrival?:    Side: Left   Location: Hip   Orientation:    Incision Type:    Closure Method:    Additional Comments:    Removal Indication and Assessment:    Wound Outcome:    Removal Indications:    Incision WDL WDL 06/05/23 0850   Dressing Appearance Dry;Intact;Clean 06/05/23 0850   Drainage Amount None 06/05/23 0850   Drainage Characteristics/Odor No odor 06/05/23 0850   Appearance Dressing in place, unable to visualize 06/05/23 0850   Periwound Area Intact;Dry 06/04/23 2141   Dressing Gauze;Transparent film 06/05/23 0850   Number of days: 8       No results for input(s): POCTGLUCOSE in the last  168 hours.     Assessment and Plan:    Left displaced femoral neck fracture  S/p left total hip arthroplasty on 05/28  - PT/OT, WBAT, posterior precautions x6  - DVT PPX with Eliquis 215 mg BID times 35 days, end 7/3  - 1 week postop PPX antibiotics with cefadroxil 1 g BID, end 6/5  - maintain surgical dressing until removed by Orthopedics  - ortho [facility] DC patient weekly SNF  - follow-up with orthopedics after discharge    Acute postoperative pain  - initiated hydrocodone 5/325 or 10/325 mg q.4 hours PRN, discontinuing oxycodone, continue Celebrex 200 mg daily, methocarbamol 400 mg QID, Lyrica 75 mg qHS.  Bowel regimen in place.    Intermittent nausea  - associated with pain medication  - initiated PRN Zofran    Acute blood loss anemia  Iron deficiency anemia secondary to blood loss (chronic)  - On IV iron infusions at home with recent severe nose bleed earlier this year as contributor to chronic losses  - continue monitor twice weekly CBCs      Hypertension  - continue home amlodipine 10 mg daily and carvedilol 12.5 mg BID, holding HCTZ in setting of hypokalemia and hypomagnesemia     CKD stage 3  - continue to enter twice weekly BMPs, avoid nephrotoxic agents, renally dose medications when appropriate    Gastroesophageal reflux disease without esophagitis  - continue home PPI      Dyslipidemia  - continue atorvastatin 80 mg daily     Coronary artery disease involving native coronary artery of native heart without angina pectoris  - continue GDMT with beta blocker and statin     Primary osteoarthritis involving multiple joints  - follows with Rheumatology, Tylenol PRN    History of gout  - continue allopurinol 200 mg daily    Seasonal allergies  - continue fluticasone spray, Singulair 10 mg qHS.    Debility   - Continue with PT/OT for gait training and strengthening and restoration of ADL's   - Encourage mobility, OOB in chair, and early ambulation as appropriate  - Fall precautions   - Monitor for bowel and bladder  dysfunction  - Monitor for and prevent skin breakdown and pressure ulcers  - Continue DVT prophylaxis with apixaban 2.5 mg BID    Malnutrition  -dietary consulted  -alb 2.5  -initiate boost TID and proteinx daily       Anticipate disposition:  Home with home health      Follow-up needed during SNF stay-    Appointment not to send patient to- chemo 6/5, ortho 6/12    Follow-up needed after discharge from SNF: PCP, Orthopedics, psychology (daughter instructed to make appt, referral ordered)    Future Appointments   Date Time Provider Department Center   6/12/2023 11:00 AM Mckay Cosby NP [facility] [address] [facility]   7/10/2023 10:45 AM Mckay Cosby NP [facility] [address] [facility]   8/7/2023 10:30 AM Armani Cai PA-C [facility]   8/16/2023 11:00 AM LAB, [facility]   8/18/2023  1:30 PM Anh Martinez MD [facility] HEM ONC [facility]   8/22/2023 10:30 AM Juan Naranjo MD [facility] [id number] [place]   3/5/2024  9:00 AM Michael Lal MD [facility] [id number] [facility]         I certify that SNF services are required to be given on an inpatient basis because Jessica Floyd needs for skilled nursing care and/or skilled rehabilitation are required on a daily basis and such services can only practically be provided in a skilled nursing facility setting and are for an ongoing condition for which she received inpatient care in the hospital.     MAREN PEÑA  Department of Hospital Medicine   [facility]     DOS: 6/5/2023     Extended Visit  Total time spent: > 30 minutes  Description of Time: counseling patient on clinical condition, therapies provided, plan of care, emotional support, coordinating patient care with other care team members, reviewing and interpreting labs and imaging, collaboration with physician, initiating new orders, chart review, and documentation. See interval hx.     Patient note was created using MModal Dictation.  Any errors in syntax or  even information may not have been identified and edited on initial review prior to signing this note.

## 2023-06-06 LAB
FINAL PATHOLOGIC DIAGNOSIS: NORMAL
Lab: NORMAL

## 2023-06-06 PROCEDURE — 11000004 HC SNF PRIVATE

## 2023-06-06 PROCEDURE — 97116 GAIT TRAINING THERAPY: CPT

## 2023-06-06 PROCEDURE — 25000003 PHARM REV CODE 250: Performed by: NURSE PRACTITIONER

## 2023-06-06 PROCEDURE — 25000003 PHARM REV CODE 250: Performed by: HOSPITALIST

## 2023-06-06 PROCEDURE — 97110 THERAPEUTIC EXERCISES: CPT

## 2023-06-06 PROCEDURE — 97535 SELF CARE MNGMENT TRAINING: CPT | Mod: CO

## 2023-06-06 PROCEDURE — 97530 THERAPEUTIC ACTIVITIES: CPT | Mod: CO

## 2023-06-06 PROCEDURE — 94761 N-INVAS EAR/PLS OXIMETRY MLT: CPT

## 2023-06-06 RX ADMIN — CARVEDILOL 6.25 MG: 3.12 TABLET, FILM COATED ORAL at 04:06

## 2023-06-06 RX ADMIN — METHOCARBAMOL 500 MG: 500 TABLET ORAL at 12:06

## 2023-06-06 RX ADMIN — ATORVASTATIN CALCIUM 80 MG: 40 TABLET, FILM COATED ORAL at 09:06

## 2023-06-06 RX ADMIN — CELECOXIB 200 MG: 200 CAPSULE ORAL at 09:06

## 2023-06-06 RX ADMIN — APIXABAN 2.5 MG: 2.5 TABLET, FILM COATED ORAL at 09:06

## 2023-06-06 RX ADMIN — PREGABALIN 75 MG: 75 CAPSULE ORAL at 09:06

## 2023-06-06 RX ADMIN — ACETAMINOPHEN 650 MG: 325 TABLET ORAL at 10:06

## 2023-06-06 RX ADMIN — CHOLECALCIFEROL TAB 25 MCG (1000 UNIT) 2000 UNITS: 25 TAB at 09:06

## 2023-06-06 RX ADMIN — CARVEDILOL 6.25 MG: 3.12 TABLET, FILM COATED ORAL at 09:06

## 2023-06-06 RX ADMIN — MONTELUKAST 10 MG: 10 TABLET, FILM COATED ORAL at 09:06

## 2023-06-06 RX ADMIN — ALLOPURINOL 200 MG: 100 TABLET ORAL at 09:06

## 2023-06-06 RX ADMIN — PANTOPRAZOLE SODIUM 40 MG: 40 TABLET, DELAYED RELEASE ORAL at 09:06

## 2023-06-06 RX ADMIN — FLUTICASONE PROPIONATE 50 MCG: 50 SPRAY, METERED NASAL at 09:06

## 2023-06-06 RX ADMIN — AMLODIPINE BESYLATE 5 MG: 5 TABLET ORAL at 09:06

## 2023-06-06 RX ADMIN — METHOCARBAMOL 500 MG: 500 TABLET ORAL at 09:06

## 2023-06-06 RX ADMIN — METHOCARBAMOL 500 MG: 500 TABLET ORAL at 04:06

## 2023-06-06 RX ADMIN — Medication 2 TABLET: at 09:06

## 2023-06-06 NOTE — TELEPHONE ENCOUNTER
Returned call and reschedule her last iron infusion. Patient states she will be out of SNF on the 15th. States a table knock her over and she broke her hip. No other questions asked.

## 2023-06-06 NOTE — TREATMENT PLAN
"Rehab Services' DME recommendations    Jessica Floyd  MRN: [id number]     [x] Walker Adult (5'4"-6"6")    Accessories N/A    Wheels No    [x] Wheelchair  Number of hours up in a wheelchair per day 6+       Style Light weight        Justification for light weight w/c: physical activity/exercise, chronic complications, and psychosocial/healthy coping    Seat Width 18 (Standard adult)    Seat Depth 18    Back Height Standard    Leg Support Standard and Swing Away    Arm Height Full, Swing Away, and Adjustable Height    Lap Belt Velcro    Accessories Front Brakes, Anti-tippers, and Safety belt    Cushion Gel    Justification for Cushion pressure relief     Justification for wheelchair order: (Please select all that apply) Caregiver is capable and willing to operate wheelchair safely, Patient's upper body strength is sufficient for propulsion, The patient has a cast, brace, or musculoskeletal condition which prevents 90 degree flexion of the knee, The patient has significant edema of the lower extremities that requires elevating leg rest, A reclining back is ordered, The patient requires the use of a wheelchair for ADLs within the home, and Patient mobility limitations cannot be sufficiently resolved by the use of other ambulatory therapies      [x] 3 in 1 commode Standard    [x] Hip Kit Standard/Short    [x] Home health PT, OT, and Aide    OCTAVIO Carmona 6/6/2023      "

## 2023-06-06 NOTE — PT/OT/SLP PROGRESS
Physical Therapy Treatment    Patient Name:  Jessica Floyd   MRN:  [id number]  Admit Date: 5/31/2023  Admitting Diagnosis: Left displaced femoral neck fracture  Recent Surgeries: TOTAL HIP ARTHROPLASTY (Left)    General Precautions: Standard, fall, vision impaired (pt with L vision field cut from a previous aneurysm)  Orthopedic Precautions: LLE weight bearing as tolerated, LLE posterior precautions  Braces: N/A    Recommendations:     Discharge Recommendations: home health PT  Level of Assistance Recommended at Discharge: Intermittent assistance   Discharge Equipment Recommendations: bedside commode, walker, rolling  Barriers to discharge: Inaccessible home environment, Decreased caregiver support    Assessment:     Jessica Floyd is a 72 y.o. female admitted with a medical diagnosis of Left displaced femoral neck fracture . Pt continues to make good functional progress and would benefit from continued SNF rehab.      Performance deficits affecting function: weakness, impaired endurance, impaired self care skills, impaired functional mobility, gait instability, impaired balance, decreased upper extremity function, decreased lower extremity function, decreased ROM, impaired cardiopulmonary response to activity.    Rehab Potential is good    Activity Tolerance: Good    Plan:     Patient to be seen 5 x/week to address the above listed problems via gait training, therapeutic activities, therapeutic exercises, wheelchair management/training    Plan of Care Expires: 07/01/23  Plan of Care Reviewed with: patient    Subjective     Pt. Agreeable to work with PT.     Pain/Comfort:  Pain Rating 1: 0/10  Pain Rating Post-Intervention 1: 0/10    Patient's cultural, spiritual, Episcopalian conflicts given the current situation:  no    Objective:     Communicated with pt's nurse prior to session.  Patient found up in chair with   upon PT entry to room.     Therapeutic Activities and Exercises: B l/E seated therex 2 x 15reps: AP, LAQ, GS,  "Hip flex R l/E only    Functional Mobility:  Transfers:     Sit to Stand:  supervision with rolling walker  Gait: ~180ft x 2 trials including stair and curb step negotiation, uneven surface, CGA/SBA for safety  Stairs:  Pt ascended/descended 4 stair(s) and 4" curb stepx 3 trials with Rolling Walker with bilateral handrails with Contact Guard Assistance.     AM-PAC 6 CLICK MOBILITY  18    Patient left up in chair with call button in reach.    GOALS:   Multidisciplinary Problems       Physical Therapy Goals          Problem: Physical Therapy    Goal Priority Disciplines Outcome Goal Variances Interventions   Physical Therapy Goal     PT, PT/OT Ongoing, Progressing     Description: Goals to be met by: 7/1/23     Patient will increase functional independence with mobility by performing:    . Supine to sit with Set-up [place]  . Sit to supine with Set-up [place]  . Rolling to Left and Right with Set-up Assistance.  . Sit to stand transfer with Supervision  . Bed to chair transfer with Supervision using Rolling Walker/ LRAD  . Gait  x 150 feet with Supervision using Rolling Walker/LRAD  . Wheelchair propulsion x150 feet with Contact Guard Assistance using bilateral uppper extremities  . Ascend/descend 12 stair with bilateral Handrails Stand-by Assistance using No Assistive Device. met  . Ascend/Descend 4 inch curb step with Stand-by Assistance using Rolling Walker/ LRAD. met                         Time Tracking:     PT Received On: 06/06/23  PT Start Time: 1330  PT Stop Time: 1408  PT Total Time (min): 38 min    Billable Minutes: Gait Training 25 and Therapeutic Exercise 13    Treatment Type: Treatment  PT/PTA: PT     Number of PTA visits since last PT visit: 0     06/06/2023  "

## 2023-06-06 NOTE — PT/OT/SLP PROGRESS
"Occupational Therapy   Treatment    Name: Jessica Floyd  MRN: [id number]  Admit Date: 5/31/2023  Admitting Diagnosis:  Left displaced femoral neck fracture    General Precautions: Standard, fall, vision impaired   Orthopedic Precautions: LLE weight bearing as tolerated   Braces: N/A    Recommendations:     Discharge Recommendations:  home with home health  Level of Assistance Recommended at Discharge: 24 hours physical assistance for all ADL's and home management tasks  Discharge Equipment Recommendations: bedside commode, walker, rolling  Barriers to discharge:  Inaccessible home environment, Decreased caregiver support    Assessment:     Jessica Floyd is a 72 y.o. female with a medical diagnosis of Left displaced femoral neck fracture .  She presents with performance deficits affecting function are weakness, impaired endurance, impaired self care skills, impaired functional mobility, gait instability, impaired balance, decreased upper extremity function, decreased lower extremity function, decreased ROM, impaired skin, edema, impaired cardiopulmonary response to activity, orthopedic precautions.     Pt participated well during today's session. Pt tolerated tx session without incident and is making progress, however, continues to demonstrate deficits with self care skills, balance, functional mobility, UB strength and endurance. Pt will benefit from continued OT services to progress towards goals.     Rehab Potential is good    Activity tolerance:  Good    Plan:     Patient to be seen 5 x/week to address the above listed problems via self-care/home management, therapeutic activities, therapeutic exercises    Plan of Care Expires: 06/15/23  Plan of Care Reviewed with: patient    Subjective   "I would love to get washed up"  Communicated with: Yony prior to session.  .    Pain/Comfort:  Pain Rating 1: 1/10  Location - Side 1: Left  Location - Orientation 1: generalized  Location 1: hip  Pain Addressed 1: Pre-medicate for " activity, Reposition, Distraction  Pain Rating Post-Intervention 1: 1/10    Patient's cultural, spiritual, Methodist conflicts given the current situation:  no    Objective:     Patient found  in bedside chair  upon OT entry to room.    Functional Mobility/Transfers:  Patient completed Sit <> Stand Transfer x 3 trials with supervision  with  rolling walker   Patient completed Toilet Transfer Step Transfer technique with stand by assistance with  rolling walker  Functional Mobility: Patient ambulate approx 14 ft in room with RW and SBA.    Activities of Daily Living:  Grooming: stand by assistance Patient in stance at sink to perform oral and facial hygiene for approx 10 min.   Bathing: stand by assistance Patient perform upper and lower body cleansing. Pt in stance to perform rear raquel hygiene in stance with use of RW for safety.   Upper Body Dressing: modified independence to doff/don pullover shirt.   Lower Body Dressing: stand by assistance to thread B LE into pants and undergarments with use of AE. (A) to ensure pt maintain posterior precautions.   Toileting: supervision to perform raquel hygiene seated on raised commode.     [facility] 6 Click ADL: 20    Treatment & Education:  Pt educated on:  - role of OT  - level of assistance  - energy conservation and task modification to maximized independence with ADL's and mobility   -  safety while performing functional transfers and self care tasks  - Orthopedic precautions and importance to adhering   - progress towards OT goals      Patient left up in chair with call button in reach    GOALS:   Multidisciplinary Problems       Occupational Therapy Goals          Problem: Occupational Therapy    Goal Priority Disciplines Outcome Interventions   Occupational Therapy Goal     OT, PT/OT Ongoing, Progressing    Description: Goals to be met by: 6/20/23     Patient will increase functional independence with ADLs by performing:    UE Dressing with [place].  LE Dressing with  [place].  Grooming while standing with [place].  Toileting from toilet with [place] for hygiene and clothing management.   Bathing from  sitting at sink with [place].  Toilet transfer to toilet with Modified [place].                         Time Tracking:     OT Date of Treatment: 06/06/23  OT Start Time: 1037    OT Stop Time: 1124  OT Total Time (min): 47 min    Billable Minutes:Self Care/Home Management 30  Therapeutic Activity 17    6/6/2023

## 2023-06-06 NOTE — PROGRESS NOTES
"Ms. Floyd was seen at [facility] today for a post-operative visit after undergoing a Lt IDA by Dr. Wan on 5/28/2023.      Interval History:  She reports that she is doing great.  She reports she has no pain.  She is not taking pain medication.  She denies falls since her hospital discharge.  She denies fever, chills, and sweats since the time of the surgery.  She is participating in her therapy sessions.  Per therapy notes, she has good progress but fair potential towards her discharge goals.  She is ambulating up to 150 feet with a RW with SBA.  She is able to negotiate 4" steps using a BHR x 3 trials.  She is able to propel herself using a wheelchair up to 150 feet.    Physical exam:  Dressing to left hip is clean, dry, and intact.  There is no drainage present.  She has varying states of healing bruising.  It is too soon to remove her dressing, therefore, left it in place.  She has tactile stimulation to their lower leg, she denies calf pain, there is no leg edema and their pedal pulse is palpable x 2.     RADS: none done today    Assessment:  Post-op visit (1 weeks)    Plan:  Current care, treatment plan, precautions, activity level/ modifications, limitations, rehabilitation exercises and proposed future treatment were discussed with the patient. We discussed the need to monitor for changes in symptoms and condition and report them to the physician.  Discussed importance of compliance with all appointments and follow up examinations.       WOUND CARE ORDERS  - Do not remove surgical dressing for 2 weeks post-op. This will be done only by MD/PALMER at initial post-op visit. If dressing is completely saturated, Call number below.   - Do not get dressings wet.   - Do not shower.   - If dressing continues to be saturated or there are signs of infection, please call [facility] [phone] for further instructions and to make appt to be seen.       PHYSICAL THERAPY:   - Continue therapy as ordered.  - Weight bearing as " tolerated   - Range of motion posterior hip precautions x 6 weeks.     PAIN MEDICATION:   - Pain medication: refill was not needed,   - Pain medication refill policy provided to patient for review, yes      DVT PROPHYLAXIS:   - Eliquis 2.5 mg bid x 4 weeks.     FOLLOW UP:   - Patient will continue to be seen on SNF weekly until their discharge then she is to return to clinic for follow up.  - Future Appointments:   Future Appointments   Date Time Provider Department Center   6/12/2023 11:00 AM Mckay Cosby NP [facility]THO [address] [place]   7/10/2023 10:45 AM Mckay Cosby NP [facility]TH[facility] [address] [place]   8/7/2023 10:30 AM Arm[facility] Cai [facility]-C [facility]   8/16/2023 11:00 AM [facility]   8/18/2023  1:30 PM Anh Martinez MD [facility]   8/22/2023 10:30 AM Juan Naranjo MD [facility] [place]   3/5/2024  9:00 AM Michael Lal MD [facility] [id number] [facility]           If there are any questions prior to scheduled follow up, the patient was instructed to contact the office

## 2023-06-07 PROCEDURE — 25000003 PHARM REV CODE 250: Performed by: NURSE PRACTITIONER

## 2023-06-07 PROCEDURE — 11000004 HC SNF PRIVATE

## 2023-06-07 PROCEDURE — 97116 GAIT TRAINING THERAPY: CPT

## 2023-06-07 PROCEDURE — 25000003 PHARM REV CODE 250: Performed by: HOSPITALIST

## 2023-06-07 PROCEDURE — 97530 THERAPEUTIC ACTIVITIES: CPT

## 2023-06-07 PROCEDURE — 97110 THERAPEUTIC EXERCISES: CPT

## 2023-06-07 PROCEDURE — 94761 N-INVAS EAR/PLS OXIMETRY MLT: CPT

## 2023-06-07 RX ADMIN — METHOCARBAMOL 500 MG: 500 TABLET ORAL at 08:06

## 2023-06-07 RX ADMIN — APIXABAN 2.5 MG: 2.5 TABLET, FILM COATED ORAL at 08:06

## 2023-06-07 RX ADMIN — ALLOPURINOL 200 MG: 100 TABLET ORAL at 08:06

## 2023-06-07 RX ADMIN — SENNOSIDES AND DOCUSATE SODIUM 1 TABLET: 50; 8.6 TABLET ORAL at 08:06

## 2023-06-07 RX ADMIN — PREGABALIN 75 MG: 75 CAPSULE ORAL at 08:06

## 2023-06-07 RX ADMIN — ATORVASTATIN CALCIUM 80 MG: 40 TABLET, FILM COATED ORAL at 08:06

## 2023-06-07 RX ADMIN — ACETAMINOPHEN 650 MG: 325 TABLET ORAL at 08:06

## 2023-06-07 RX ADMIN — METHOCARBAMOL 500 MG: 500 TABLET ORAL at 05:06

## 2023-06-07 RX ADMIN — CARVEDILOL 6.25 MG: 3.12 TABLET, FILM COATED ORAL at 05:06

## 2023-06-07 RX ADMIN — MONTELUKAST 10 MG: 10 TABLET, FILM COATED ORAL at 08:06

## 2023-06-07 RX ADMIN — Medication 2 TABLET: at 08:06

## 2023-06-07 RX ADMIN — FLUTICASONE PROPIONATE 50 MCG: 50 SPRAY, METERED NASAL at 08:06

## 2023-06-07 RX ADMIN — CHOLECALCIFEROL TAB 25 MCG (1000 UNIT) 2000 UNITS: 25 TAB at 08:06

## 2023-06-07 RX ADMIN — PANTOPRAZOLE SODIUM 40 MG: 40 TABLET, DELAYED RELEASE ORAL at 08:06

## 2023-06-07 RX ADMIN — CARVEDILOL 6.25 MG: 3.12 TABLET, FILM COATED ORAL at 07:06

## 2023-06-07 RX ADMIN — CELECOXIB 200 MG: 200 CAPSULE ORAL at 08:06

## 2023-06-07 RX ADMIN — AMLODIPINE BESYLATE 5 MG: 5 TABLET ORAL at 08:06

## 2023-06-07 RX ADMIN — METHOCARBAMOL 500 MG: 500 TABLET ORAL at 01:06

## 2023-06-07 NOTE — PLAN OF CARE
Problem: Occupational Therapy  Goal: Occupational Therapy Goal  Description: Goals to be met by: 6/20/23     Patient will increase functional independence with ADLs by performing:    UE Dressing with [place].  LE Dressing with [place].  Grooming while standing with [place].  Toileting from toilet with [place] for hygiene and clothing management.   Bathing from  sitting at sink with [place].  Toilet transfer to toilet with Modified [place].    Outcome: Ongoing, Progressing

## 2023-06-07 NOTE — TREATMENT PLAN
"Rehab Services' DME recommendations    Jessica Floyd  MRN: [id number]    [x] Walker Adult (5'1"-6"6")    Wheels Yes      [x] 3 in 1 commode Standard        [x] Home health PT, OT, and Aide      Roly Patrick, PTA 6/7/2023     "

## 2023-06-07 NOTE — PT/OT/SLP PROGRESS
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 female admitted with a medical diagnosis of Left displaced femoral neck fracture .  Performance deficits affecting function: weakness, impaired endurance, impaired self care skills, impaired functional mobility, gait instability, impaired balance, decreased upper extremity function, decreased lower extremity function, impaired cardiopulmonary response to activity, orthopedic precautions.    Rehab Potential is good    Activity Tolerance: Good    Plan:     Patient to be seen 5 x/week to address the above listed problems via gait training, therapeutic activities, therapeutic exercises, wheelchair management/training    Plan of Care Expires: 07/01/23  Plan of Care Reviewed with: patient    Subjective     Pt. Agreeable to work with PT.     Pain/Comfort:  Pain Rating 1: 0/10  Pain Rating Post-Intervention 1: 0/10    Patient's cultural, spiritual, Latter-day conflicts given the current situation:  no    Objective:     Communicated with pt's nurse prior to session.  Patient found up in chair with   upon PT entry to room.     Therapeutic Activities and Exercises: B l/E seated therex 2 x 15reps: AP, LAQ, GS, Hip flex R l/E only       Functional Mobility:  Transfers:     Sit to Stand:  stand  "by assistance with rolling walker  Bed to Chair: stand by assistance with  rolling walker  using  Stand Pivot  Gait: ~250ft including stairs and curb step with RW and SBA for safety  Stairs:  Pt ascended/descended 8 steps +4 steps stair(s) and 4" curb step with Rolling Walker with bilateral handrails with Stand-by Assistance.     AM-PAC 6 CLICK MOBILITY  18    Patient left up in chair with call button in reach.    GOALS:   Multidisciplinary Problems       Physical Therapy Goals          Problem: Physical Therapy    Goal Priority Disciplines Outcome Goal Variances Interventions   Physical Therapy Goal     PT, PT/OT Ongoing, Progressing     Description: Goals to be met by: 7/1/23     Patient will increase functional independence with mobility by performing:    . Supine to sit with Set-up [place]  . Sit to supine with Set-up [place]  . Rolling to Left and Right with Set-up Assistance.  . Sit to stand transfer with Supervision  . Bed to chair transfer with Supervision using Rolling Walker/ LRAD  . Gait  x 150 feet with Supervision using Rolling Walker/LRAD  . Wheelchair propulsion x150 feet with Contact Guard Assistance using bilateral uppper extremities  . Ascend/descend 12 stair with bilateral Handrails Stand-by Assistance using No Assistive Device. met  . Ascend/Descend 4 inch curb step with Stand-by Assistance using Rolling Walker/ LRAD. met                         Time Tracking:     PT Received On: 06/07/23  PT Start Time: 1408  PT Stop Time: 1448  PT Total Time (min): 40 min    Billable Minutes: Gait Training 20, Therapeutic Activity 10, and Therapeutic Exercise 20    Treatment Type: Treatment  PT/PTA: PT     Number of PTA visits since last PT visit: 0     06/07/2023  "

## 2023-06-08 LAB
ANION GAP SERPL CALC-SCNC: 9 MMOL/L (ref 8–16)
BASOPHILS # BLD AUTO: 0.13 K/UL (ref 0–0.2)
BASOPHILS NFR BLD: 1.5 % (ref 0–1.9)
BUN SERPL-MCNC: 16 MG/DL (ref 8–23)
CALCIUM SERPL-MCNC: 8.9 MG/DL (ref 8.7–10.5)
CHLORIDE SERPL-SCNC: 105 MMOL/L (ref 95–110)
CO2 SERPL-SCNC: 25 MMOL/L (ref 23–29)
CREAT SERPL-MCNC: 0.8 MG/DL (ref 0.5–1.4)
DIFFERENTIAL METHOD: ABNORMAL
EOSINOPHIL # BLD AUTO: 0.4 K/UL (ref 0–0.5)
EOSINOPHIL NFR BLD: 4.5 % (ref 0–8)
ERYTHROCYTE [DISTWIDTH] IN BLOOD BY AUTOMATED COUNT: 22.8 % (ref 11.5–14.5)
EST. GFR  (NO RACE VARIABLE): >60 ML/MIN/1.73 M^2
GLUCOSE SERPL-MCNC: 77 MG/DL (ref 70–110)
HCT VFR BLD AUTO: 28.2 % (ref 37–48.5)
HGB BLD-MCNC: 8.7 G/DL (ref 12–16)
IMM GRANULOCYTES # BLD AUTO: 0.08 K/UL (ref 0–0.04)
IMM GRANULOCYTES NFR BLD AUTO: 0.9 % (ref 0–0.5)
LYMPHOCYTES # BLD AUTO: 2.1 K/UL (ref 1–4.8)
LYMPHOCYTES NFR BLD: 23.8 % (ref 18–48)
MAGNESIUM SERPL-MCNC: 1.6 MG/DL (ref 1.6–2.6)
MCH RBC QN AUTO: 30.3 PG (ref 27–31)
MCHC RBC AUTO-ENTMCNC: 30.9 G/DL (ref 32–36)
MCV RBC AUTO: 98 FL (ref 82–98)
MONOCYTES # BLD AUTO: 1.8 K/UL (ref 0.3–1)
MONOCYTES NFR BLD: 20.7 % (ref 4–15)
NEUTROPHILS # BLD AUTO: 4.3 K/UL (ref 1.8–7.7)
NEUTROPHILS NFR BLD: 48.6 % (ref 38–73)
NRBC BLD-RTO: 0 /100 WBC
PHOSPHATE SERPL-MCNC: 4.2 MG/DL (ref 2.7–4.5)
PLATELET # BLD AUTO: 385 K/UL (ref 150–450)
PMV BLD AUTO: 10.4 FL (ref 9.2–12.9)
POTASSIUM SERPL-SCNC: 3.7 MMOL/L (ref 3.5–5.1)
RBC # BLD AUTO: 2.87 M/UL (ref 4–5.4)
SODIUM SERPL-SCNC: 139 MMOL/L (ref 136–145)
WBC # BLD AUTO: 8.88 K/UL (ref 3.9–12.7)

## 2023-06-08 PROCEDURE — 11000004 HC SNF PRIVATE

## 2023-06-08 PROCEDURE — 94761 N-INVAS EAR/PLS OXIMETRY MLT: CPT

## 2023-06-08 PROCEDURE — 80048 BASIC METABOLIC PNL TOTAL CA: CPT | Performed by: HOSPITALIST

## 2023-06-08 PROCEDURE — 25000003 PHARM REV CODE 250: Performed by: NURSE PRACTITIONER

## 2023-06-08 PROCEDURE — 85025 COMPLETE CBC W/AUTO DIFF WBC: CPT | Performed by: HOSPITALIST

## 2023-06-08 PROCEDURE — 97110 THERAPEUTIC EXERCISES: CPT

## 2023-06-08 PROCEDURE — 97530 THERAPEUTIC ACTIVITIES: CPT | Mod: CO

## 2023-06-08 PROCEDURE — 83735 ASSAY OF MAGNESIUM: CPT | Performed by: HOSPITALIST

## 2023-06-08 PROCEDURE — 36415 COLL VENOUS BLD VENIPUNCTURE: CPT | Performed by: HOSPITALIST

## 2023-06-08 PROCEDURE — 97535 SELF CARE MNGMENT TRAINING: CPT | Mod: CO

## 2023-06-08 PROCEDURE — 97116 GAIT TRAINING THERAPY: CPT

## 2023-06-08 PROCEDURE — 25000003 PHARM REV CODE 250: Performed by: HOSPITALIST

## 2023-06-08 PROCEDURE — 84100 ASSAY OF PHOSPHORUS: CPT | Performed by: HOSPITALIST

## 2023-06-08 RX ADMIN — METHOCARBAMOL 500 MG: 500 TABLET ORAL at 01:06

## 2023-06-08 RX ADMIN — ALLOPURINOL 200 MG: 100 TABLET ORAL at 09:06

## 2023-06-08 RX ADMIN — CHOLECALCIFEROL TAB 25 MCG (1000 UNIT) 2000 UNITS: 25 TAB at 09:06

## 2023-06-08 RX ADMIN — ATORVASTATIN CALCIUM 80 MG: 40 TABLET, FILM COATED ORAL at 09:06

## 2023-06-08 RX ADMIN — AMLODIPINE BESYLATE 5 MG: 5 TABLET ORAL at 09:06

## 2023-06-08 RX ADMIN — CELECOXIB 200 MG: 200 CAPSULE ORAL at 09:06

## 2023-06-08 RX ADMIN — METHOCARBAMOL 500 MG: 500 TABLET ORAL at 09:06

## 2023-06-08 RX ADMIN — SENNOSIDES AND DOCUSATE SODIUM 1 TABLET: 50; 8.6 TABLET ORAL at 09:06

## 2023-06-08 RX ADMIN — APIXABAN 2.5 MG: 2.5 TABLET, FILM COATED ORAL at 09:06

## 2023-06-08 RX ADMIN — FLUTICASONE PROPIONATE 50 MCG: 50 SPRAY, METERED NASAL at 09:06

## 2023-06-08 RX ADMIN — CARVEDILOL 6.25 MG: 3.12 TABLET, FILM COATED ORAL at 05:06

## 2023-06-08 RX ADMIN — ACETAMINOPHEN 650 MG: 325 TABLET ORAL at 09:06

## 2023-06-08 RX ADMIN — CARVEDILOL 6.25 MG: 3.12 TABLET, FILM COATED ORAL at 07:06

## 2023-06-08 RX ADMIN — Medication 2 TABLET: at 09:06

## 2023-06-08 RX ADMIN — PANTOPRAZOLE SODIUM 40 MG: 40 TABLET, DELAYED RELEASE ORAL at 09:06

## 2023-06-08 RX ADMIN — METHOCARBAMOL 500 MG: 500 TABLET ORAL at 05:06

## 2023-06-08 RX ADMIN — MONTELUKAST 10 MG: 10 TABLET, FILM COATED ORAL at 09:06

## 2023-06-08 RX ADMIN — PREGABALIN 75 MG: 75 CAPSULE ORAL at 09:06

## 2023-06-08 NOTE — PROGRESS NOTES
[facility] - Skilled Nursing  Adult Nutrition  Progress Note    SUMMARY   Recommendations  Continue regular, high protein,  beneprotein BID, RD following  Goals: Pt to meet her nutritional needs and meet >75% of EEN and EPN  Nutrition Goal Status: progressing towards goal  Communication of RD Recs: other (comment) (POC)    Assessment and Plan    Endocrine  Malnutrition of mild degree  Mild Malnutrition  Malnutrition Type:  Context: social/environmental circumstances   Level: Mild     Related to (etiology):   Inadequate oral intake and lack of appetite      Signs and Symptoms (as evidenced by):   Intake of  <75% of estimated protein needs  Weight Loss (Malnutrition): 6% weight loss in 6 months (not significant)     Malnutrition Characteristic Summary:  Muscle Mass (Malnutrition): moderate depletion     Plan  Collaboration with other providers  Nutrition education-high protein diet 6/8  General diet  Increased protein diet-double meats  Commercial food(protein) beneprotein powder BID           Malnutrition Assessment 6/1  Malnutrition Context: social/environmental circumstances  Malnutrition Level: mild  Skin (Micronutrient): bruised  Nails (Micronutrient): none  Eyes (Micronutrient): conjunctiva dull           Orbital Region (Subcutaneous Fat Loss): moderate depletion  Upper Arm Region (Subcutaneous Fat Loss): mild depletion   Restorationism Region (Muscle Loss): mild depletion  Clavicle Bone Region (Muscle Loss): mild depletion  Clavicle and Acromion Bone Region (Muscle Loss): mild depletion  Dorsal Hand (Muscle Loss): moderate depletion  Patellar Region (Muscle Loss): moderate depletion  Anterior Thigh Region (Muscle Loss): mild depletion  Posterior Calf Region (Muscle Loss): mild depletion                 Reason for Assessment    Reason For Assessment: RD follow-up  Diagnosis: other (see comments) (Left displaced femoral neck fracture)  Relevant Medical History: HTN, CAD, 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 Use explained to patient how to use. She agrees but does not want high kayla boost shakes, order cancelled informed NP Elen.  Nutrition Discharge Planning: regular diet high protein, ONS of choice    Nutrition/Diet History    Spiritual, Cultural Beliefs, Church Practices, Values that Affect Care: no    Anthropometrics    Temp: 97.9 °F (36.6 °C)  Height: 5' 4" (162.6 cm)  Height (inches): 64 in  Weight Method: Standard Scale  Weight: 62.9 kg (138 lb 10.7 oz)  Weight (lb): 138.67 lb  Ideal Body Weight (IBW), Female: 120 lb  % Ideal Body Weight, Female (lb): 115.56 %  BMI (Calculated): 23.8  BMI Grade: 18.5-24.9 - normal  Weight Loss: unintentional  Usual Body Weight (UBW), k.45 kg  Weight Change Amount: 5 lb 15.8 oz (4% weight loss in 1 month, 6% weight loss in 6 months)  % Usual Body Weight: 96.3  % Weight Change From Usual Weight: -3.9 %       Lab/Procedures/Meds    Pertinent Labs Reviewed: reviewed  Pertinent Labs Comments: Hg 8.7, Hct 28.2, albumin 2.5, PAB 31,  Pertinent Medications Reviewed: reviewed  Pertinent Medications Comments: Senna docusate, Polyethlene glycol, Statins, Ca - vit D3, Vit D    Estimated/Assessed Needs    Weight Used For Calorie Calculations: 62.9 kg (138 lb 10.7 oz)  Energy Calorie Requirements (kcal): 1119  Energy Need Method: [place]-St Jeor ((x 1.4(PAL)))  Protein Requirements: 76  - 95 (1.2 - 1.5 g/kg) (Older Adults)  Weight Used For Protein Calculations: 62.9 kg (138 lb 10.7 oz)        RDA Method (mL): 1119         Nutrition Prescription Ordered    Current Diet Order: Regular, double meats, beneprotein BID  Nutrition Order Comments: PO 75%  Oral Nutrition Supplement: boost plus TID    Evaluation of Received Nutrient/Fluid Intake     % Intake of Estimated Energy Needs: 75 - 100 %  % Meal Intake: 75 " - 100 %    Nutrition Risk    Level of Risk/Frequency of Follow-up: low (one time per week)     Monitor and Evaluation    Food and Nutrient Intake: energy intake, food and beverage intake  Food and Nutrient Adminstration: diet order  Knowledge/Beliefs/Attitudes: food and nutrition knowledge/skill  Physical Activity and Function: nutrition-related ADLs and IADLs  Anthropometric Measurements: weight, weight change, body mass index  Biochemical Data, Medical Tests and Procedures: electrolyte and renal panel, gastrointestinal profile, glucose/endocrine profile  Nutrition-Focused Physical Findings: overall appearance     Nutrition Follow-Up    RD Follow-up?: Yes

## 2023-06-08 NOTE — PT/OT/SLP PROGRESS
"Occupational Therapy   Treatment/Family Training    Name: Jessica Floyd  MRN: [id number]  Admit Date: 5/31/2023  Admitting Diagnosis:  Left displaced femoral neck fracture    General Precautions: Standard, fall, vision impaired   Orthopedic Precautions: LLE weight bearing as tolerated, LLE posterior precautions   Braces: N/A    Recommendations:     Discharge Recommendations:  home health OT  Level of Assistance Recommended at Discharge: Intermittent assistance for ADL's and homemaking tasks (per OT as of 6/8/23)  Discharge Equipment Recommendations: bedside commode, walker, rolling  Barriers to discharge:  Inaccessible home environment, Decreased caregiver support    Assessment:     Jessica Floyd is a 72 y.o. female with a medical diagnosis of Left displaced femoral neck fracture .  She presents with performance deficits affecting function are weakness, impaired endurance, impaired self care skills, impaired functional mobility, gait instability, impaired balance, decreased upper extremity function, decreased lower extremity function, decreased ROM, impaired skin, edema, impaired cardiopulmonary response to activity, orthopedic precautions.     Pt and family participated in family training during today's session. Pt tolerated tx session without incident and is making progress, however, continues to demonstrate deficits with self care skills, balance, functional mobility, UB strength and endurance. Pt will benefit from continued OT services to progress towards goals.     Rehab Potential is good    Activity tolerance:  Good    Plan:     Patient to be seen 5 x/week to address the above listed problems via self-care/home management, therapeutic activities, therapeutic exercises    Plan of Care Expires: 06/15/23  Plan of Care Reviewed with: patient, spouse, daughter    Subjective   "Hey there darling, doing good"  Communicated with: Yony prior to session.  .    Pain/Comfort:  Pain Rating 1: 1/10  Location - Side 1: " Left  Location - Orientation 1: generalized  Location 1: hip  Pain Addressed 1: Pre-medicate for activity, Reposition, Distraction  Pain Rating Post-Intervention 1: 1/10    Patient's cultural, spiritual, Yarsanism conflicts given the current situation:  no    Objective:     Patient found up in bathroom on raised commode with family present upon OT entry to room.    Bed Mobility:    Patient completed Rolling/Turning to Left with  modified independence  Patient completed Rolling/Turning to Right with modified independence  Patient completed Scooting/Bridging with modified independence  Patient completed Supine to Sit with modified independence  Patient completed Sit to Supine with modified independence     Functional Mobility/Transfers:  Patient completed Sit <> Stand Transfer with supervision  with  rolling walker   Patient completed Bed <> Chair Transfer using Step Transfer technique with supervision with rolling walker  Patient completed Toilet Transfer Stand Pivot technique with supervision with  grab bars    Activities of Daily Living:  Grooming: independence to perform facial and hand hygiene seated at sink.   Lower Body Dressing: supervision to thread B LE into pants and don B socks with use od AE. Pt in stance to manage pants over hips with RW.   Toileting: modified independence to perform raquel hygiene seated on commode.     [facility] 6 Click ADL: 20    Treatment & Education:  Pt and family educated on:  - role of OT  - level of assistance  - DME recommendations   - adaptive equipment\  - energy conservation and task modification to maximized independence with ADL's and mobility   -  safety while performing functional transfers and self care tasks,   - orthopedic precautions and importance to adhering to precautions  - progress towards OT goals.     MARTELL address all questions and concerns within scope of practice.     Patient left up in chair with  PT and family present in rehab gym.     GOALS:   Multidisciplinary  Problems       Occupational Therapy Goals          Problem: Occupational Therapy    Goal Priority Disciplines Outcome Interventions   Occupational Therapy Goal     OT, PT/OT Ongoing, Progressing    Description: Goals to be met by: 6/20/23     Patient will increase functional independence with ADLs by performing:    UE Dressing with [place].  LE Dressing with [place].  Grooming while standing with [place].  Toileting from toilet with [place] for hygiene and clothing management.   Bathing from  sitting at sink with [place].  Toilet transfer to toilet with Modified [place].                         Time Tracking:     OT Date of Treatment: 06/08/23  OT Start Time: 0906    OT Stop Time: 0953  OT Total Time (min): 47 min    Billable Minutes:Self Care/Home Management 30  Therapeutic Activity 17    6/8/2023

## 2023-06-08 NOTE — PT/OT/SLP PROGRESS
Physical Therapy Treatment    Patient Name:  Jessica Floyd   MRN:  [id number]  Admit Date: 5/31/2023  Admitting Diagnosis: Left displaced femoral neck fracture  Recent Surgeries: TOTAL HIP ARTHROPLASTY (Left)    General Precautions: Standard, fall, vision impaired (pt with L vision field cut from a previous aneurysm)  Orthopedic Precautions: LLE weight bearing as tolerated, LLE posterior precautions  Braces: N/A    Recommendations:     Discharge Recommendations: home health PT  Level of Assistance Recommended at Discharge: Intermittent assistance   Discharge Equipment Recommendations: bedside commode, walker, rolling  Barriers to discharge: Inaccessible home environment, Decreased caregiver support    Assessment:     Jessica Floyd is a 72 y.o. female admitted with a medical diagnosis of Left displaced femoral neck fracture . Pt's family completed family training. They verb and demo understanding on how to provide appropriate assistance/guarding for the following tasks: ransfers, ambulation, curb step, stairs. They were also informed on DME recommendations and that SPV is recommended for safety upon D/C. Pt's family verb understanding.  .      Performance deficits affecting function: weakness, impaired endurance, impaired self care skills, impaired functional mobility, gait instability, impaired balance, decreased upper extremity function, decreased lower extremity function, impaired cardiopulmonary response to activity, orthopedic precautions.    Rehab Potential is good    Activity Tolerance: Good    Plan:     Patient to be seen 5 x/week to address the above listed problems via gait training, therapeutic activities, therapeutic exercises, wheelchair management/training    Plan of Care Expires: 07/01/23  Plan of Care Reviewed with: patient    Subjective     Pt. Agreeable to work with PT.     Pain/Comfort:  Pain Rating 1: 0/10  Pain Rating Post-Intervention 1: 0/10    Patient's cultural, spiritual, [employer]ybeterian conflicts  "given the current situation:  no    Objective:     Communicated with pt's nurse prior to session.  Patient found up in chair with   upon PT entry to room.     Therapeutic Activities and Exercises:B l/E seated therex 2 x 15reps: AP, LAQ, GS, Hip flex R l/E only   UBE x 10mins to help improve pt's cardiovascular endurance.    Functional Mobility:  Transfers:     Sit to Stand:  supervision with rolling walker  Gait: ~240ft with RW and (SBA)  Stairs:  Pt ascended/descended 8 stair(s) and 4" curb step with Rolling Walker with bilateral handrails with Supervision or Set-up Assistance/ SBA.     AM-PAC 6 CLICK MOBILITY  18    Patient left up in chair with call button in reach.    GOALS:   Multidisciplinary Problems       Physical Therapy Goals          Problem: Physical Therapy    Goal Priority Disciplines Outcome Goal Variances Interventions   Physical Therapy Goal     PT, PT/OT Ongoing, Progressing     Description: Goals to be met by: 7/1/23     Patient will increase functional independence with mobility by performing:    . Supine to sit with Set-up Stanton  . Sit to supine with Set-up Stanton  . Rolling to Left and Right with Set-up Assistance.  . Sit to stand transfer with Supervision  . Bed to chair transfer with Supervision using Rolling Walker/ LRAD  . Gait  x 150 feet with Supervision using Rolling Walker/LRAD  . Wheelchair propulsion x150 feet with Contact Guard Assistance using bilateral uppper extremities  . Ascend/descend 12 stair with bilateral Handrails Stand-by Assistance using No Assistive Device. met  . Ascend/Descend 4 inch curb step with Stand-by Assistance using Rolling Walker/ LRAD. met                         Time Tracking:     PT Received On: 06/08/23  PT Start Time: 0952  PT Stop Time: 1022  PT Total Time (min): 30 min    Billable Minutes: Gait Training 15 and Therapeutic Exercise 15    Treatment Type: Treatment, Family Training  PT/PTA: PT     Number of PTA visits since last PT visit: 0 "     06/08/2023

## 2023-06-08 NOTE — PLAN OF CARE
Continue regular, high protein,  beneprotein BID,RD following  Goals: Pt to meet her nutritional needs and meet >75% of EEN and EPN  Nutrition Goal Status: progressing towards goal  Communication of RD Recs: other (comment) (POC)     Assessment and Plan     Endocrine  Malnutrition of mild degree  Mild Malnutrition  Malnutrition Type:  Context: social/environmental circumstances   Level: Mild     Related to (etiology):   Inadequate oral intake and lack of appetite      Signs and Symptoms (as evidenced by):   Intake of  <75% of estimated protein needs  Weight Loss (Malnutrition): 6% weight loss in 6 months (not significant)     Malnutrition Characteristic Summary:  Muscle Mass (Malnutrition): moderate depletion     Plan  Collaboration with other providers  Nutrition education-high protein diet 6/8  General diet  Increased protein diet-double meats  Commercial food(protein) beneprotein powder BID

## 2023-06-08 NOTE — PLAN OF CARE
Problem: Adult Inpatient Plan of Care  Goal: Absence of Hospital-Acquired Illness or Injury  Outcome: Ongoing, Progressing     Problem: Adult Inpatient Plan of Care  Goal: Optimal Comfort and Wellbeing  Outcome: Ongoing, Progressing     Problem: Adult Inpatient Plan of Care  Goal: Readiness for Transition of Care  Outcome: Ongoing, Progressing     Problem: Fall Injury Risk  Goal: Absence of Fall and Fall-Related Injury  Outcome: Ongoing, Progressing      Received recent labs/lipid panel faxed over to us. Will place in 's mail box for review.

## 2023-06-08 NOTE — PROGRESS NOTES
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 Wan.  Admission to SNF for secondary weakness and debility.    Patient will be treated at [facility] with PT and OT to improve functional status and ability to perform ADLs.     Interval history:  24 hr vital sign ranges listed below. Labs reviewed and listed below. Patient denies shortness of breath, abdominal discomfort, nausea, or vomiting.  Patient reports an adequate appetite eating 100%.  Patient denies dysuria.  Patient reports having regular bowel movements last 6/8.  Patient progessing with PT/OT-  Gait:  ~180ft x 2 trials including stair and curb step negotiation, uneven surface, CGA/SBA for safety. Ambulated to restroom with SBA. Continuing to follow and treat all acute and chronic conditions.      Allergies: Patient has No Known Allergies.    ROS  Constitutional: Negative for fever   Eyes: Negative for blurred vision, double vision   Respiratory: Negative for cough, shortness of breath   Cardiovascular: Negative for chest pain, palpitations.  +mild leg swelling.   Gastrointestinal: Negative for abdominal pain, constipation, diarrhea, nausea, vomiting.   Genitourinary: Negative for dysuria, frequency   Musculoskeletal:  + generalized weakness.    Skin: Negative for itching and rash.   Neurological:  Negative for dizziness, headaches.   Psychiatric/Behavioral: Negative for depression. The patient is not nervous/anxious.      24 hour Vital Sign Range   Temp:  [97.9 °F (36.6 °C)-98.1 °F (36.7 °C)]   Pulse:  [71-77]   Resp:  [18-20]   BP: (145-154)/(68-76)   SpO2:  [95 %-97 %]     Current BMI: Body mass index is 23.8 kg/m².    PEx  Constitutional: Patient appears debilitated.  No distress noted  HENT:   Head: Normocephalic and atraumatic.   Eyes: Pupils are equal, round  Neck: Normal range of motion. Neck supple.   Cardiovascular: Normal rate, regular rhythm and normal heart sounds.    Pulmonary/Chest: Effort normal and breath sounds are clear  Abdominal: Soft. Bowel sounds are normal.   Musculoskeletal: Normal range of motion.   Neurological: Alert and oriented to person, place, and time.   Psychiatric: Normal mood and affect. Behavior is normal.   Skin: Skin is warm and dry. Surgical dressing to LLE, only to removed by Ortho    Recent Labs   Lab 06/08/23  0407      K 3.7      CO2 25   BUN 16   CREATININE 0.8   MG 1.6       Recent Labs   Lab 06/08/23  0407   WBC 8.88   RBC 2.87*   HGB 8.7*   HCT 28.2*      MCV 98   MCH 30.3   MCHC 30.9*             Incision/Site 05/28/23 0854 Left Hip (Active)   05/28/23 0854   Present Prior to Hospital Arrival?:    Side: Left   Location: Hip   Orientation:    Incision Type:    Closure Method:    Additional Comments:    Removal Indication and Assessment:    Wound Outcome:    Removal Indications:    Incision WDL WDL 06/05/23 0850   Dressing Appearance Dry;Intact;Clean 06/05/23 0850   Drainage Amount None 06/05/23 0850   Drainage Characteristics/Odor No odor 06/05/23 0850   Appearance Dressing in place, unable to visualize 06/05/23 0850   Periwound Area Intact;Dry 06/04/23 2141   Dressing Gauze;Transparent film 06/05/23 0850   Number of days: 8       No results for input(s): POCTGLUCOSE in the last 168 hours.     Assessment and Plan:    Left displaced femoral neck  fracture  S/p left total hip arthroplasty on 05/28  - PT/OT, WBAT, posterior precautions x6  - DVT PPX with Eliquis 215 mg BID times 35 days, end 7/3  - 1 week postop PPX antibiotics with cefadroxil 1 g BID, end 6/5  - maintain surgical dressing until removed by Orthopedics  - ortho PALMER DC patient weekly SNF  - follow-up with orthopedics after discharge    Acute postoperative pain  - initiated hydrocodone 5/325 or 10/325 mg q.4 hours PRN, discontinuing oxycodone, continue Celebrex 200 mg daily, methocarbamol 400 mg QID, Lyrica 75 mg qHS.  Bowel regimen in place.    Intermittent nausea  - associated with pain medication  - initiated PRN Zofran    Acute blood loss anemia  Iron deficiency anemia secondary to blood loss (chronic)  - On IV iron infusions at home with recent severe nose bleed earlier this year as contributor to chronic losses  - continue monitor twice weekly CBCs      Hypertension  - continue home amlodipine 10 mg daily and carvedilol 12.5 mg BID, holding HCTZ in setting of hypokalemia and hypomagnesemia     CKD stage 3  - continue to enter twice weekly BMPs, avoid nephrotoxic agents, renally dose medications when appropriate    Gastroesophageal reflux disease without esophagitis  - continue home PPI      Dyslipidemia  - continue atorvastatin 80 mg daily     Coronary artery disease involving native coronary artery of native heart without angina pectoris  - continue GDMT with beta blocker and statin     Primary osteoarthritis involving multiple joints  - follows with Rheumatology, Tylenol PRN    History of gout  - continue allopurinol 200 mg daily    Seasonal allergies  - continue fluticasone spray, Singulair 10 mg qHS.    Debility   - Continue with PT/OT for gait training and strengthening and restoration of ADL's   - Encourage mobility, OOB in chair, and early ambulation as appropriate  - Fall precautions   - Monitor for bowel and bladder dysfunction  - Monitor for and prevent skin breakdown and pressure  ulcers  - Continue DVT prophylaxis with apixaban 2.5 mg BID    Malnutrition  -dietary consulted  -alb 2.5  -initiate boost TID and proteinx daily       Anticipate disposition:  Home with home health on 6/14 but requesting 6/15 as no family will be home until then      Follow-up needed during SNF stay-    Appointment not to send patient to- chemo 6/5, ortho 6/12    Follow-up needed after discharge from SNF: PCP, Orthopedics, psychology (daughter instructed to make appt, referral ordered)    Future Appointments   Date Time Provider Department Center   6/12/2023 11:00 AM Mckay Cosby NP [facility]THO [facility]   6/22/2023  8:30 AM CHEMO 08 [facility] CHEMO [facility]   7/10/2023 10:45 AM Mckay Cosby NP [facility] ORTHO [facility]   8/7/2023 10:30 AM [facility]ai [facility]   8/16/2023 11:00 AM [facility]   8/18/2023  1:30 PM Anh Martinez MD [facility] HEM [facility]   8/22/2023 10:30 AM Juan Naranjo MD [facility] [place]   3/5/2024  9:00 AM Michael Lal MD [facility] [id number] [facility]         I certify that SNF services are required to be given on an inpatient basis because Jessica Floyd needs for skilled nursing care and/or skilled rehabilitation are required on a daily basis and such services can only practically be provided in a skilled nursing facility setting and are for an ongoing condition for which she received inpatient care in the hospital.     MAREN PEÑA  Department of Hospital Medicine   [facility]     DOS: 6/8/2023     Extended Visit  Total time spent: > 30 minutes  Description of Time: counseling patient on clinical condition, therapies provided, plan of care, emotional support, coordinating patient care with other care team members, reviewing and interpreting labs and imaging, collaboration with physician, initiating new orders, chart review, and documentation. See interval hx.     Patient note was  created using MModal Dictation.  Any errors in syntax or even information may not have been identified and edited on initial review prior to signing this note.

## 2023-06-09 PROBLEM — E87.1 HYPONATREMIA: Status: RESOLVED | Noted: 2023-06-02 | Resolved: 2023-06-09

## 2023-06-09 PROCEDURE — 25000003 PHARM REV CODE 250: Performed by: NURSE PRACTITIONER

## 2023-06-09 PROCEDURE — 97535 SELF CARE MNGMENT TRAINING: CPT

## 2023-06-09 PROCEDURE — 25000003 PHARM REV CODE 250: Performed by: HOSPITALIST

## 2023-06-09 PROCEDURE — 11000004 HC SNF PRIVATE

## 2023-06-09 PROCEDURE — 97530 THERAPEUTIC ACTIVITIES: CPT

## 2023-06-09 RX ORDER — CARVEDILOL 6.25 MG/1
6.25 TABLET ORAL 2 TIMES DAILY WITH MEALS
Qty: 60 TABLET | Refills: 11 | Status: SHIPPED | OUTPATIENT
Start: 2023-06-09 | End: 2024-03-05 | Stop reason: SDUPTHER

## 2023-06-09 RX ORDER — AMOXICILLIN 250 MG
1 CAPSULE ORAL 2 TIMES DAILY
Qty: 60 TABLET | Refills: 3 | Status: SHIPPED | OUTPATIENT
Start: 2023-06-09 | End: 2023-09-01

## 2023-06-09 RX ORDER — AMLODIPINE BESYLATE 5 MG/1
5 TABLET ORAL DAILY
Qty: 30 TABLET | Refills: 3 | Status: SHIPPED | OUTPATIENT
Start: 2023-06-09 | End: 2023-08-25 | Stop reason: SDUPTHER

## 2023-06-09 RX ADMIN — METHOCARBAMOL 500 MG: 500 TABLET ORAL at 01:06

## 2023-06-09 RX ADMIN — PANTOPRAZOLE SODIUM 40 MG: 40 TABLET, DELAYED RELEASE ORAL at 09:06

## 2023-06-09 RX ADMIN — MONTELUKAST 10 MG: 10 TABLET, FILM COATED ORAL at 09:06

## 2023-06-09 RX ADMIN — METHOCARBAMOL 500 MG: 500 TABLET ORAL at 09:06

## 2023-06-09 RX ADMIN — APIXABAN 2.5 MG: 2.5 TABLET, FILM COATED ORAL at 09:06

## 2023-06-09 RX ADMIN — Medication 2 TABLET: at 09:06

## 2023-06-09 RX ADMIN — PREGABALIN 75 MG: 75 CAPSULE ORAL at 09:06

## 2023-06-09 RX ADMIN — FLUTICASONE PROPIONATE 50 MCG: 50 SPRAY, METERED NASAL at 09:06

## 2023-06-09 RX ADMIN — ACETAMINOPHEN 650 MG: 325 TABLET ORAL at 09:06

## 2023-06-09 RX ADMIN — AMLODIPINE BESYLATE 5 MG: 5 TABLET ORAL at 09:06

## 2023-06-09 RX ADMIN — ATORVASTATIN CALCIUM 80 MG: 40 TABLET, FILM COATED ORAL at 09:06

## 2023-06-09 RX ADMIN — SENNOSIDES AND DOCUSATE SODIUM 1 TABLET: 50; 8.6 TABLET ORAL at 09:06

## 2023-06-09 RX ADMIN — CHOLECALCIFEROL TAB 25 MCG (1000 UNIT) 2000 UNITS: 25 TAB at 09:06

## 2023-06-09 RX ADMIN — ALLOPURINOL 200 MG: 100 TABLET ORAL at 09:06

## 2023-06-09 RX ADMIN — Medication 6 MG: at 09:06

## 2023-06-09 RX ADMIN — METHOCARBAMOL 500 MG: 500 TABLET ORAL at 05:06

## 2023-06-09 RX ADMIN — CARVEDILOL 6.25 MG: 3.12 TABLET, FILM COATED ORAL at 09:06

## 2023-06-09 RX ADMIN — CARVEDILOL 6.25 MG: 3.12 TABLET, FILM COATED ORAL at 05:06

## 2023-06-09 RX ADMIN — CELECOXIB 200 MG: 200 CAPSULE ORAL at 09:06

## 2023-06-09 RX ADMIN — POLYETHYLENE GLYCOL 3350 17 G: 17 POWDER, FOR SOLUTION ORAL at 09:06

## 2023-06-09 NOTE — PLAN OF CARE
[facility] - Skilled Nursing      HOME HEALTH ORDERS  FACE TO FACE ENCOUNTER    Patient Name: Jessica Floyd  YOB: 1950    PCP: Effie Olmedo MD   PCP Address: [address] [place] [address]  PCP Phone Number: [phone]  PCP Fax: [phone]    Encounter Date: 6/9/2023      Admit to Home Health    Diagnoses:  Active Hospital Problems    Diagnosis  POA    *Left displaced femoral neck fracture [S72.002A]  Yes    Malnutrition of mild degree [E44.1]  Yes    Acute blood loss anemia [D62]  Yes    Iron deficiency anemia secondary to blood loss (chronic) [D50.0]  Yes    Localized primary osteoarthritis of right shoulder region [M19.011 (ICD-10-CM)] [M19.011]  Yes    Allergic rhinitis [J30.9]  Yes    Hypertension [I10]  Yes    Stage 3a chronic kidney disease [N18.31]  Yes    Gastroesophageal reflux disease without esophagitis [K21.9]  Yes    Coronary artery disease involving native coronary artery of native heart without angina pectoris [I25.10]  Yes     [facility] 2014:  mLAD 20%  mCx 50%  mRCA 20%      Dyslipidemia [E78.5]  Yes      Resolved Hospital Problems    Diagnosis Date Resolved POA    Hyponatremia [E87.1] 06/09/2023 Yes       Follow Up Appointments:  Future Appointments   Date Time Provider Department Center   6/12/2023 11:00 AM [facility]   6/22/2023  8:30 AM CHEMO [address] [place] [address] CHEMO [facility]   7/10/2023 10:45 AM Mckay Cosby NP [facility] ORTHO [facility]   8/7/2023 10:30 AM Armani Cai PA-C [facility]   8/16/2023 11:00 AM LAB, [facility] LAB [facility]   8/18/2023  1:30 PM Anh Martinez MD [facility] HEM ONC [facility]   8/22/2023 10:30 AM Juan Naranjo MD [facility] RMTLGY [place]   3/5/2024  9:00 AM Michael Lal MD [facility] [id number] [facility]       Allergies:Review of patient's allergies indicates:  No Known Allergies    Medications: Review discharge medications with patient and family and provide  education.    Current Facility-Administered Medications   Medication Dose Route Frequency Provider Last Rate Last Admin    acetaminophen tablet 650 mg  650 mg Oral Q6H PRN Guille Ulrich MD   650 mg at 06/09/23 0934    allopurinoL tablet 200 mg  200 mg Oral Daily Guille Ulrich MD   200 mg at 06/09/23 0913    amLODIPine tablet 5 mg  5 mg Oral Daily Alyssia Ambrose NP   5 mg at 06/09/23 0916    apixaban tablet 2.5 mg  2.5 mg Oral BID Alyssia Ambrose NP   2.5 mg at 06/09/23 0916    atorvastatin tablet 80 mg  80 mg Oral Daily Guille Ulrich MD   80 mg at 06/09/23 0913    calcium carbonate 200 mg calcium (500 mg) chewable tablet 500 mg  500 mg Oral BID PRN Guille Ulrich MD   500 mg at 05/31/23 2226    calcium-vitamin D3 500 mg-5 mcg (200 unit) per tablet 2 tablet  2 tablet Oral Daily Guille Ulrich MD   2 tablet at 06/09/23 0916    carvediloL tablet 6.25 mg  6.25 mg Oral BID WM Alyssia Ambrose NP   6.25 mg at 06/09/23 0914    celecoxib capsule 200 mg  200 mg Oral Daily Guille Ulrich MD   200 mg at 06/09/23 0915    fluticasone propionate 50 mcg/actuation nasal spray 50 mcg  1 spray Each Nostril Daily Guille Ulrich MD   50 mcg at 06/09/23 0914    HYDROcodone-acetaminophen  mg per tablet 1 tablet  1 tablet Oral Q4H PRN Alyssia Ambrose NP   1 tablet at 06/03/23 1437    HYDROcodone-acetaminophen 5-325 mg per tablet 1 tablet  1 tablet Oral Q4H PRN Alyssia Ambrose NP   1 tablet at 06/05/23 2101    melatonin tablet 6 mg  6 mg Oral Nightly PRN Guille Ulrich MD        methocarbamoL tablet 500 mg  500 mg Oral QID Guille Ulrich MD   500 mg at 06/09/23 0916    montelukast tablet 10 mg  10 mg Oral QHS Guille Ulrich MD   10 mg at 06/08/23 2151    ondansetron disintegrating tablet 4 mg  4 mg Oral Q6H PRN Alyssia Ambrose, PAXTON        pantoprazole EC tablet 40 mg  40 mg Oral Daily Guille Ulrich MD   40 mg at 06/09/23 0914    polyethylene glycol packet 17 g  17 g Oral  Daily Guille Ulrich MD   17 g at 06/09/23 0913    pregabalin capsule 75 mg  75 mg Oral QHS Guille Ulrich MD   75 mg at 06/08/23 2151    senna-docusate 8.6-50 mg per tablet 1 tablet  1 tablet Oral BID Guille Ulrich MD   1 tablet at 06/09/23 0915    vitamin D 1000 units tablet 2,000 Units  2,000 Units Oral Daily Guille Ulrich MD   2,000 Units at 06/09/23 0915     Current Discharge Medication List        START taking these medications    Details   senna-docusate 8.6-50 mg (PERICOLACE) 8.6-50 mg per tablet Take 1 tablet by mouth 2 (two) times daily.  Qty: 60 tablet, Refills: 3           CONTINUE these medications which have CHANGED    Details   amLODIPine (NORVASC) 5 MG tablet Take 1 tablet (5 mg total) by mouth once daily.  Qty: 30 tablet, Refills: 3    Comments: .  Associated Diagnoses: Essential hypertension      carvediloL (COREG) 6.25 MG tablet Take 1 tablet (6.25 mg total) by mouth 2 (two) times daily with meals.  Qty: 60 tablet, Refills: 11    Comments: .           CONTINUE these medications which have NOT CHANGED    Details   allopurinoL (ZYLOPRIM) 100 MG tablet Take 2 tablets (200 mg total) by mouth once daily.  Qty: 60 tablet, Refills: 11    Associated Diagnoses: Chronic gout without tophus, unspecified cause, unspecified site      apixaban (ELIQUIS) 2.5 mg Tab Take 1 tablet (2.5 mg total) by mouth 2 (two) times daily. End date July 3, 2023  Qty: 60 tablet, Refills: 1      atorvastatin (LIPITOR) 80 MG tablet TAKE 1 TABLET BY MOUTH EVERY DAY  Qty: 90 tablet, Refills: 3    Associated Diagnoses: Dyslipidemia      calcium-vitamin D3 (OS-WHITNEY 500 + D3) 500 mg-5 mcg (200 unit) per tablet Take 2 tablets by mouth once daily.  Qty: 60 tablet, Refills: 11      celecoxib (CELEBREX) 200 MG capsule Take 1 capsule (200 mg total) by mouth once daily.      fluticasone propionate (FLONASE) 50 mcg/actuation nasal spray SPRAY ONCE INTO EACH NOSTRIL ONCE DAILY  Qty: 16 mL, Refills: 3    Associated  Diagnoses: Viral upper respiratory tract infection      melatonin (MELATIN) 3 mg tablet Take 2 tablets (6 mg total) by mouth nightly as needed for Insomnia.  Refills: 0      methocarbamoL (ROBAXIN) 500 MG Tab Take 1 tablet (500 mg total) by mouth 4 (four) times daily.  Qty: 40 tablet, Refills: 0      montelukast (SINGULAIR) 10 mg tablet Take 1 tablet (10 mg total) by mouth every evening.  Qty: 90 tablet, Refills: 11      oxyCODONE (ROXICODONE) 5 MG immediate release tablet Take 1 tablet (5 mg total) by mouth every 4 (four) hours as needed (pain scale 7-10).  Refills: 0    Comments: Quantity prescribed more than 7 day supply? No      pantoprazole (PROTONIX) 40 MG tablet TAKE 1 TABLET BY MOUTH EVERY DAY  Qty: 90 tablet, Refills: 0    Associated Diagnoses: Gastroesophageal reflux disease without esophagitis      polyethylene glycol (GLYCOLAX) 17 gram PwPk Take 17 g by mouth once daily.  Refills: 0      pregabalin (LYRICA) 75 MG capsule Take 1 capsule (75 mg total) by mouth every evening.  Qty: 30 capsule, Refills: 6      vitamin D (VITAMIN D3) 50 mcg (2,000 unit) Tab Take 1 tablet (2,000 Units total) by mouth once daily.           STOP taking these medications       cefadroxil (DURICEF) 1 gram tablet Comments:   Reason for Stopping:                 I have seen and examined this patient within the last 30 days. My clinical findings that support the need for the home health skilled services and home bound status are the following:no   Weakness/numbness causing balance and gait disturbance due to Fracture and Surgery making it taxing to leave home.  Requiring assistive device to leave home due to unsteady gait caused by  Fracture and Surgery.  Medical restrictions requiring assistance of another human to leave home due to  Unstable ambulation, Decreased range of motions in extremities, and Post surgery monitoring.     Diet:   regular diet    Labs:  Report Lab results to PCP.    Referrals/ Consults  Physical Therapy to  evaluate and treat. Evaluate for home safety and equipment needs; Establish/upgrade home exercise program. Perform / instruct on therapeutic exercises, gait training, transfer training, and Range of Motion.  Occupational Therapy to evaluate and treat. Evaluate home environment for safety and equipment needs. Perform/Instruct on transfers, ADL training, ROM, and therapeutic exercises.  Aide to provide assistance with personal care, ADLs, and vital signs.    Activities:   activity as tolerated    Nursing:   Agency to admit patient within 24 hours of hospital discharge unless specified on physician order or at patient request    SN to complete comprehensive assessment including routine vital signs. Instruct on disease process and s/s of complications to report to MD. Review/verify medication list sent home with the patient at time of discharge  and instruct patient/caregiver as needed. Frequency may be adjusted depending on start of care date.     Skilled nurse to perform up to 3 visits PRN for symptoms related to diagnosis    Notify MD if SBP > 160 or < 90; DBP > 90 or < 50; HR > 120 or < 50; Temp > 101; O2 < 88%    Ok to schedule additional visits based on staff availability and patient request on consecutive days within the home health episode.    When multiple disciplines ordered:    Start of Care occurs on Sunday - Wednesday schedule remaining discipline evaluations as ordered on separate consecutive days following the start of care.    Thursday SOC -schedule subsequent evaluations Friday and Monday the following week.     Friday - Saturday SOC - schedule subsequent discipline evaluations on consecutive days starting Monday of the following week.    For all post-discharge communication and subsequent orders please contact patient's primary care physician. If unable to reach primary care physician or do not receive response within 30 minutes, please contact [facility] for clinical staff order  clarification    Miscellaneous   3 in 1 commode, standard walker with wheels    Home Health Aide:  Nursing , Physical Therapy , Occupational Therapy , and Home Health Aide     Wound Care Orders  Keep bandages in place to LLE until orthopedics follow up.  Do not get wet or immerse in water.  Reinforce PRN if fraying    I certify that this patient is confined to her home and needs intermittent skilled nursing care, physical therapy, and occupational therapy.    SAMMY HOLDEN, APRN  6/9/2023

## 2023-06-09 NOTE — PT/OT/SLP PROGRESS
Occupational Therapy   Treatment    Name: Jessica Floyd  MRN: [id number]  Admit Date: 5/31/2023  Admitting Diagnosis:  Left displaced femoral neck fracture    General Precautions: Standard, fall, vision impaired   Orthopedic Precautions: LLE weight bearing as tolerated   Braces: N/A    Recommendations:     Discharge Recommendations:  home health OT  Level of Assistance Recommended at Discharge: Intermittent supervision  Discharge Equipment Recommendations: bedside commode  Barriers to discharge:  None    Assessment:     Jessica Floyd is a 72 y.o. female with a medical diagnosis of Left displaced femoral neck fracture.  She presents with performance deficits affecting function are gait instability, impaired balance, decreased lower extremity function, orthopedic precautions. Pt agreeable to therapy. Pt expressed anxiety re d/c home. All questions and concerns were addressed. Pt demo significant improvement in mobility and ADLs. Pt participates well and is motivated to regain functional independence in mobility and self care.      Rehab Potential is good    Activity tolerance:  Good    Plan:     Patient to be seen 5 x/week to address the above listed problems via self-care/home management, therapeutic activities, therapeutic exercises    Plan of Care Expires: 06/15/23  Plan of Care Reviewed with: patient, spouse    Subjective     Communicated with: RN prior to session.     Pain/Comfort:  Pain Rating 1: 0/10  Pain Rating Post-Intervention 1: 0/10    Patient's cultural, spiritual, Orthodoxy conflicts given the current situation:  no    Objective:     Patient found  in bathroom  with  (no lines) upon OT entry to room.    Bed Mobility:    Not observed    Functional Mobility/Transfers:  Patient completed Sit <> Stand Transfer with modified independence  with  rolling walker   Patient completed Toilet Transfer Step Transfer technique with modified independence with  rolling walker  Functional Mobility: Mod I with  RW    Activities of Daily Living:  Grooming: independence    Upper Body Dressing: independence    Lower Body Dressing: modified independence with reacher and sock aid as needed  Toileting: independence      [facility] 6 Click ADL: 24      Treatment & Education:  Pt edu re OT role, POC and safety.  Pt edu re mobility and performance of ADLs at home.  Pt performed the above ADLs and worked on mobility to prepare for using Rollator at home.    Patient left up in chair with call button in reach and friend present    GOALS:   Multidisciplinary Problems       Occupational Therapy Goals          Problem: Occupational Therapy    Goal Priority Disciplines Outcome Interventions   Occupational Therapy Goal     OT, PT/OT Ongoing, Progressing    Description: Goals to be met by: 6/20/23     Patient will increase functional independence with ADLs by performing:    UE Dressing with [place].  LE Dressing with [place].  Grooming while standing with [place].  Toileting from toilet with [place] for hygiene and clothing management.   Bathing from  sitting at sink with [place].  Toilet transfer to toilet with Modified [place].                         Time Tracking:     OT Date of Treatment: 06/09/23  OT Start Time: 1405    OT Stop Time: 1450  OT Total Time (min): 45 min    Billable Minutes:Self Care/Home Management 20 minutes  Therapeutic Activity 25 minutes    LUL Tran  6/9/2023  Pager: [phone]

## 2023-06-09 NOTE — PLAN OF CARE
Radha issued NOMNC with discharge date of Monday, 6/12/23. Issued to patient, patient states she wants to appeal because her  is out of town till next Wednesday and she cannot discharge home alone on Monday. Explained the process of appeal needing to be called in to [employer], patient states she would rather call Radha directly. Explained that is not how it worked multiple times. Resident is cognitively in tact with a BIMS of 15 but still stating this. Call placed to the  awaiting call back so appeal process can be explained to him.

## 2023-06-09 NOTE — PLAN OF CARE
Problem: Occupational Therapy  Goal: Occupational Therapy Goal  Description: Goals to be met by: 6/20/23     Patient will increase functional independence with ADLs by performing:    UE Dressing with [place].  LE Dressing with [place].  Grooming while standing with [place].  Toileting from toilet with [place] for hygiene and clothing management.   Bathing from  sitting at sink with [place].  Toilet transfer to toilet with Modified [place].    Outcome: Ongoing, Progressing     Goals remain appropriate. Cont POC.

## 2023-06-09 NOTE — PROGRESS NOTES
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 Wan.  Admission to SNF for secondary weakness and debility.    Patient will be treated at [facility] with PT and OT to improve functional status and ability to perform ADLs.     Interval history:  24 hr vital sign ranges listed below. Patient tearful this morning, refusing discussion 2/2 being issued NOMNC today from [employer] with dishcarge date of Monday 6/12. She is beginning the appeal process. Home health orders placed in the meantime in anticipation of discharge Monday.   Patient progessing with PT/OT-  Gait:  ~240ft with RW and (SBA)  Stairs:  Pt ascended/descended 8 stair(s) and 4" curb step with Rolling Walker with bilateral handrails with Supervision or Set-up Assistance/ SBA.     Continuing to follow and treat all acute and chronic conditions.      Allergies: Patient has No Known Allergies.    ROS  Constitutional: Negative for fever   Eyes: Negative for blurred vision, double vision   Respiratory: Negative for cough, shortness of breath   Cardiovascular: Negative for chest pain, palpitations.  +mild leg swelling.   Gastrointestinal: Negative for abdominal pain, constipation, diarrhea, nausea, vomiting.   Genitourinary: Negative for dysuria, frequency   Musculoskeletal:  + generalized weakness.    Skin: " Negative for itching and rash.   Neurological: Negative for dizziness, headaches.   Psychiatric/Behavioral: Positive for anxiety      24 hour Vital Sign Range   Temp:  [97.6 °F (36.4 °C)-97.9 °F (36.6 °C)]   Pulse:  [71-82]   Resp:  [14-18]   BP: (130-133)/(67-77)   SpO2:  [97 %]     Current BMI: Body mass index is 23.8 kg/m².    PEx  Constitutional: Patient appears debilitated.  No distress noted  HENT:   Head: Normocephalic and atraumatic.   Eyes: Pupils are equal, round  Neck: Normal range of motion. Neck supple.   Cardiovascular: Normal rate, regular rhythm and normal heart sounds.    Pulmonary/Chest: Effort normal and breath sounds are clear  Abdominal: Soft. Bowel sounds are normal.   Musculoskeletal: Normal range of motion.   Neurological: Alert and oriented to person, place, and time.   Psychiatric: Tearful situational anxiety  Skin: Skin is warm and dry. Surgical dressing to LLE, only to removed by Ortho    No results for input(s): GLUCOSE, NA, K, CL, CO2, BUN, CREATININE, MG in the last 24 hours.    Invalid input(s):  CALCIUM      No results for input(s): WBC, RBC, HGB, HCT, PLT, MCV, MCH, MCHC in the last 24 hours.            Incision/Site 05/28/23 0854 Left Hip (Active)   05/28/23 0854   Present Prior to Hospital Arrival?:    Side: Left   Location: Hip   Orientation:    Incision Type:    Closure Method:    Additional Comments:    Removal Indication and Assessment:    Wound Outcome:    Removal Indications:    Incision WDL WDL 06/05/23 0850   Dressing Appearance Dry;Intact;Clean 06/05/23 0850   Drainage Amount None 06/05/23 0850   Drainage Characteristics/Odor No odor 06/05/23 0850   Appearance Dressing in place, unable to visualize 06/05/23 0850   Periwound Area Intact;Dry 06/04/23 2141   Dressing Gauze;Transparent film 06/05/23 0850   Number of days: 8       No results for input(s): POCTGLUCOSE in the last 168 hours.     Assessment and Plan:    Left displaced femoral neck fracture  S/p left total hip  arthroplasty on 05/28  - PT/OT, WBAT, posterior precautions x6  - DVT PPX with Eliquis 215 mg BID times 35 days, end 7/3  - 1 week postop PPX antibiotics with cefadroxil 1 g BID, end 6/5  - maintain surgical dressing until removed by Orthopedics  - ortho [facility] DC patient weekly SNF  - follow-up with orthopedics after discharge    Acute postoperative pain  - initiated hydrocodone 5/325 or 10/325 mg q.4 hours PRN, discontinuing oxycodone, continue Celebrex 200 mg daily, methocarbamol 400 mg QID, Lyrica 75 mg qHS.  Bowel regimen in place.    Intermittent nausea  - associated with pain medication  - initiated PRN Zofran    Acute blood loss anemia  Iron deficiency anemia secondary to blood loss (chronic)  - On IV iron infusions at home with recent severe nose bleed earlier this year as contributor to chronic losses  - continue monitor twice weekly CBCs      Hypertension  - continue home amlodipine 10 mg daily and carvedilol 12.5 mg BID, holding HCTZ in setting of hypokalemia and hypomagnesemia     CKD stage 3  - continue to enter twice weekly BMPs, avoid nephrotoxic agents, renally dose medications when appropriate    Gastroesophageal reflux disease without esophagitis  - continue home PPI      Dyslipidemia  - continue atorvastatin 80 mg daily     Coronary artery disease involving native coronary artery of native heart without angina pectoris  - continue GDMT with beta blocker and statin     Primary osteoarthritis involving multiple joints  - follows with Rheumatology, Tylenol PRN    History of gout  - continue allopurinol 200 mg daily    Seasonal allergies  - continue fluticasone spray, Singulair 10 mg qHS.    Debility   - Continue with PT/OT for gait training and strengthening and restoration of ADL's   - Encourage mobility, OOB in chair, and early ambulation as appropriate  - Fall precautions   - Monitor for bowel and bladder dysfunction  - Monitor for and prevent skin breakdown and pressure ulcers  - Continue DVT  prophylaxis with apixaban 2.5 mg BID    Malnutrition  -dietary consulted  -alb 2.5  -initiate boost TID and proteinx daily       Anticipate disposition:  Home with home health on 6/14 but requesting 6/15 as no family will be home until then      Follow-up needed during SNF stay-    Appointment not to send patient to- chemo 6/5, ortho 6/12    Follow-up needed after discharge from SNF: PCP, Orthopedics, psychology (daughter instructed to make appt, referral ordered)    Future Appointments   Date Time Provider Department Center   6/12/2023 11:00 AM Mckay Cosby NP [facility] ORTHO [facility]   6/22/2023  8:30 AM CHEMO 08 [facility] CHEMO [facility]   7/10/2023 10:45 AM Mckay Cosby NP [facility] ORTHO [facility]   8/7/2023 10:30 AM Armani Cai PA-C [facility] [place]   8/16/2023 11:00 AM LAB, [facility] [place]   8/18/2023  1:30 PM Anh Martinez MD [facility] HEM ONC [facility]   8/22/2023 10:30 AM Juan Naranjo MD [id number] [place]   3/5/2024  9:00 AM Michael Lal MD [facility] [id number] [facility]         I certify that SNF services are required to be given on an inpatient basis because Jessica Floyd needs for skilled nursing care and/or skilled rehabilitation are required on a daily basis and such services can only practically be provided in a skilled nursing facility setting and are for an ongoing condition for which she received inpatient care in the hospital.     MAREN PEÑA  Department of Hospital Medicine   [facility]     DOS: 6/9/2023     Extended Visit  Total time spent: 85 minutes  Description of Time: counseling patient on clinical condition, therapies provided, plan of care, emotional support, coordinating patient care with other care team members, reviewing and interpreting labs and imaging, collaboration with physician, initiating new orders, chart review, and documentation. See interval hx.     Patient note was created using MModal  Dictation.  Any errors in syntax or even information may not have been identified and edited on initial review prior to signing this note.

## 2023-06-10 PROCEDURE — 25000003 PHARM REV CODE 250: Performed by: NURSE PRACTITIONER

## 2023-06-10 PROCEDURE — 94761 N-INVAS EAR/PLS OXIMETRY MLT: CPT

## 2023-06-10 PROCEDURE — 97116 GAIT TRAINING THERAPY: CPT | Mod: CQ

## 2023-06-10 PROCEDURE — 97530 THERAPEUTIC ACTIVITIES: CPT | Mod: CQ

## 2023-06-10 PROCEDURE — 25000003 PHARM REV CODE 250: Performed by: HOSPITALIST

## 2023-06-10 PROCEDURE — 11000004 HC SNF PRIVATE

## 2023-06-10 RX ADMIN — APIXABAN 2.5 MG: 2.5 TABLET, FILM COATED ORAL at 08:06

## 2023-06-10 RX ADMIN — PREGABALIN 75 MG: 75 CAPSULE ORAL at 08:06

## 2023-06-10 RX ADMIN — CARVEDILOL 6.25 MG: 3.12 TABLET, FILM COATED ORAL at 06:06

## 2023-06-10 RX ADMIN — AMLODIPINE BESYLATE 5 MG: 5 TABLET ORAL at 08:06

## 2023-06-10 RX ADMIN — Medication 6 MG: at 08:06

## 2023-06-10 RX ADMIN — FLUTICASONE PROPIONATE 50 MCG: 50 SPRAY, METERED NASAL at 08:06

## 2023-06-10 RX ADMIN — Medication 2 TABLET: at 08:06

## 2023-06-10 RX ADMIN — MONTELUKAST 10 MG: 10 TABLET, FILM COATED ORAL at 08:06

## 2023-06-10 RX ADMIN — ALLOPURINOL 200 MG: 100 TABLET ORAL at 08:06

## 2023-06-10 RX ADMIN — METHOCARBAMOL 500 MG: 500 TABLET ORAL at 06:06

## 2023-06-10 RX ADMIN — CHOLECALCIFEROL TAB 25 MCG (1000 UNIT) 2000 UNITS: 25 TAB at 08:06

## 2023-06-10 RX ADMIN — SENNOSIDES AND DOCUSATE SODIUM 1 TABLET: 50; 8.6 TABLET ORAL at 08:06

## 2023-06-10 RX ADMIN — ACETAMINOPHEN 650 MG: 325 TABLET ORAL at 08:06

## 2023-06-10 RX ADMIN — METHOCARBAMOL 500 MG: 500 TABLET ORAL at 01:06

## 2023-06-10 RX ADMIN — ATORVASTATIN CALCIUM 80 MG: 40 TABLET, FILM COATED ORAL at 08:06

## 2023-06-10 RX ADMIN — CARVEDILOL 6.25 MG: 3.12 TABLET, FILM COATED ORAL at 07:06

## 2023-06-10 RX ADMIN — ACETAMINOPHEN 650 MG: 325 TABLET ORAL at 06:06

## 2023-06-10 RX ADMIN — METHOCARBAMOL 500 MG: 500 TABLET ORAL at 08:06

## 2023-06-10 RX ADMIN — PANTOPRAZOLE SODIUM 40 MG: 40 TABLET, DELAYED RELEASE ORAL at 08:06

## 2023-06-10 RX ADMIN — CELECOXIB 200 MG: 200 CAPSULE ORAL at 08:06

## 2023-06-10 NOTE — PT/OT/SLP PROGRESS
"Physical Therapy Treatment    Patient Name:  Jessica Floyd   MRN:  [id number]  Admit Date: 5/31/2023  Admitting Diagnosis: Left displaced femoral neck fracture  Recent Surgeries: TOTAL HIP ARTHROPLASTY (Left)    General Precautions: Standard, fall, vision impaired (pt with L vision field cut from a previous aneurysm)  Orthopedic Precautions: LLE weight bearing as tolerated, LLE posterior precautions  Braces: N/A    Recommendations:     Discharge Recommendations: home health PT  Level of Assistance Recommended at Discharge: Intermittent assistance   Discharge Equipment Recommendations: bedside commode, walker, rolling  Barriers to discharge: Inaccessible home environment, Decreased caregiver support    Assessment:     Jessica Floyd is a 72 y.o. female admitted with a medical diagnosis of Left displaced femoral neck fracture .   Pt was agreeable and tolerated session well. Pt completed gait training, stair training, wheelchair mobility and therex with no issues. Pt able to recall and maintain 3/3 posterior hip precautions with no issues. Pt continues to benefit from therapy to achieve highest level of independence prior to discharge.    Performance deficits affecting function: weakness, impaired endurance, impaired self care skills, impaired functional mobility, gait instability, impaired balance, decreased upper extremity function, decreased lower extremity function, impaired cardiopulmonary response to activity, orthopedic precautions.    Rehab Potential is good    Activity Tolerance: Good    Plan:     Patient to be seen 5 x/week to address the above listed problems via gait training, therapeutic activities, therapeutic exercises, wheelchair management/training    Plan of Care Expires: 07/01/23  Plan of Care Reviewed with: patient    Subjective     "It's more discomfort than pain".     Pain/Comfort:  Pain Rating 1: 0/10  Pain Rating Post-Intervention 1: 0/10    Patient's cultural, spiritual, Samaritan conflicts given " the current situation:  no    Objective:     Patient found up in chair with  (no lines) upon PT entry to room.     Therapeutic Activities and Exercises:   Pt able to recall 3/3 precaution with no issues  Seated B:E therex 2x 20 reps: ankle DF/PF, LAQ, quad set and glute set  Patient educated on role of therapy, goals of session, and benefits of out of bed mobility.   Instructed on use of call button and importance of calling nursing staff for assistance with mobility   Questions/concerns addressed within PTA scope of practice  Pt verbalized understanding.    Functional Mobility:  Transfers:   Sit <> Stand Transfer: supervision with rolling walker   Bed <> Chair Transfer: stand by assistance   1x rolling walker using Step Transfer technique   1x noAD using Stand piviot  Gait:  Pt ambulated ~140+110 ft with stand by assistance and rolling walker.  1 seated rest breaks & no LOB  Gait Deviation(s):  mostly steady gait with good aliza and upright posture   Verbal/tactile cues for looking up and pacing  Wheelchair Propulsion:    Pt propelled Standard wheelchair x ~150 feet on Level tile with  Bilateral upper extremity with Supervision or Set-up Assistance.   Stairs:  Pt ascended/descended  2 trials of 4  with bilateral handrails with Stand-by Assistance.   Step-to gait to ascend and descend  Each trial completed within gait trial      AM-PAC 6 CLICK MOBILITY  19    Patient left up in chair with call button in reach.    GOALS:   Multidisciplinary Problems       Physical Therapy Goals          Problem: Physical Therapy    Goal Priority Disciplines Outcome Goal Variances Interventions   Physical Therapy Goal     PT, PT/OT Ongoing, Progressing     Description: Goals to be met by: 7/1/23     Patient will increase functional independence with mobility by performing:    . Supine to sit with Set-up [place]  . Sit to supine with Set-up [place]  . Rolling to Left and Right with Set-up Assistance.  . Sit to stand  transfer with Supervision  . Bed to chair transfer with Supervision using Rolling Walker/ LRAD  . Gait  x 150 feet with Supervision using Rolling Walker/LRAD  . Wheelchair propulsion x150 feet with Contact Guard Assistance using bilateral uppper extremities  . Ascend/descend 12 stair with bilateral Handrails Stand-by Assistance using No Assistive Device. met  . Ascend/Descend 4 inch curb step with Stand-by Assistance using Rolling Walker/ LRAD. met                         Time Tracking:     PT Received On: 06/10/23  PT Start Time: 1339  PT Stop Time: 1408  PT Total Time (min): 29 min    Billable Minutes: Gait Training 15 and Therapeutic Activity 14    Treatment Type: Treatment  PT/PTA: PTA     Number of PTA visits since last PT visit: 1     06/10/2023

## 2023-06-10 NOTE — NURSING
"Pt received a "third party call today" that she would be discharged this coming Monday. Pt stated her family is out of town. Her children live in other states also. Pt does have someone who can stay with her, but it is a disabled person who she will have to care for too. Pt had someone who visited her today, however, not sure who that was. Person came to visit when she was upset. Nurse calmed resident, acknowledged feelings, allowed her to vent. Pt would like to see case management to discuss discharge details.  "

## 2023-06-11 PROCEDURE — 94761 N-INVAS EAR/PLS OXIMETRY MLT: CPT

## 2023-06-11 PROCEDURE — 25000003 PHARM REV CODE 250: Performed by: NURSE PRACTITIONER

## 2023-06-11 PROCEDURE — 25000003 PHARM REV CODE 250: Performed by: HOSPITALIST

## 2023-06-11 PROCEDURE — 11000004 HC SNF PRIVATE

## 2023-06-11 RX ADMIN — SENNOSIDES AND DOCUSATE SODIUM 1 TABLET: 50; 8.6 TABLET ORAL at 08:06

## 2023-06-11 RX ADMIN — ALLOPURINOL 200 MG: 100 TABLET ORAL at 08:06

## 2023-06-11 RX ADMIN — APIXABAN 2.5 MG: 2.5 TABLET, FILM COATED ORAL at 08:06

## 2023-06-11 RX ADMIN — CELECOXIB 200 MG: 200 CAPSULE ORAL at 08:06

## 2023-06-11 RX ADMIN — CHOLECALCIFEROL TAB 25 MCG (1000 UNIT) 2000 UNITS: 25 TAB at 08:06

## 2023-06-11 RX ADMIN — CARVEDILOL 6.25 MG: 3.12 TABLET, FILM COATED ORAL at 06:06

## 2023-06-11 RX ADMIN — AMLODIPINE BESYLATE 5 MG: 5 TABLET ORAL at 08:06

## 2023-06-11 RX ADMIN — CARVEDILOL 6.25 MG: 3.12 TABLET, FILM COATED ORAL at 08:06

## 2023-06-11 RX ADMIN — METHOCARBAMOL 500 MG: 500 TABLET ORAL at 01:06

## 2023-06-11 RX ADMIN — Medication 2 TABLET: at 08:06

## 2023-06-11 RX ADMIN — METHOCARBAMOL 500 MG: 500 TABLET ORAL at 06:06

## 2023-06-11 RX ADMIN — METHOCARBAMOL 500 MG: 500 TABLET ORAL at 08:06

## 2023-06-11 RX ADMIN — MONTELUKAST 10 MG: 10 TABLET, FILM COATED ORAL at 08:06

## 2023-06-11 RX ADMIN — PANTOPRAZOLE SODIUM 40 MG: 40 TABLET, DELAYED RELEASE ORAL at 08:06

## 2023-06-11 RX ADMIN — ACETAMINOPHEN 650 MG: 325 TABLET ORAL at 08:06

## 2023-06-11 RX ADMIN — PREGABALIN 75 MG: 75 CAPSULE ORAL at 08:06

## 2023-06-11 RX ADMIN — ATORVASTATIN CALCIUM 80 MG: 40 TABLET, FILM COATED ORAL at 08:06

## 2023-06-11 RX ADMIN — Medication 6 MG: at 08:06

## 2023-06-11 RX ADMIN — FLUTICASONE PROPIONATE 50 MCG: 50 SPRAY, METERED NASAL at 08:06

## 2023-06-12 ENCOUNTER — LAB VISIT (OUTPATIENT)
Dept: LAB | Facility: HOSPITAL | Age: 73
End: 2023-06-12
Payer: MEDICARE

## 2023-06-12 ENCOUNTER — OFFICE VISIT (OUTPATIENT)
Dept: ORTHOPEDICS | Facility: CLINIC | Age: 73
End: 2023-06-12
Payer: MEDICARE

## 2023-06-12 VITALS
HEART RATE: 86 BPM | OXYGEN SATURATION: 97 % | WEIGHT: 135.38 LBS | RESPIRATION RATE: 18 BRPM | TEMPERATURE: 98 F | DIASTOLIC BLOOD PRESSURE: 86 MMHG | SYSTOLIC BLOOD PRESSURE: 172 MMHG | BODY MASS INDEX: 23.11 KG/M2 | HEIGHT: 64 IN

## 2023-06-12 VITALS — SYSTOLIC BLOOD PRESSURE: 164 MMHG | DIASTOLIC BLOOD PRESSURE: 80 MMHG | HEART RATE: 85 BPM

## 2023-06-12 DIAGNOSIS — M85.9 DISORDER OF BONE DENSITY AND STRUCTURE, UNSPECIFIED: ICD-10-CM

## 2023-06-12 DIAGNOSIS — M81.6 LOCALIZED OSTEOPOROSIS (LEQUESNE): ICD-10-CM

## 2023-06-12 DIAGNOSIS — Z98.890 POST-OPERATIVE STATE: ICD-10-CM

## 2023-06-12 DIAGNOSIS — S72.002A LEFT DISPLACED FEMORAL NECK FRACTURE: Primary | ICD-10-CM

## 2023-06-12 DIAGNOSIS — S72.002A LEFT DISPLACED FEMORAL NECK FRACTURE: ICD-10-CM

## 2023-06-12 LAB
25(OH)D3+25(OH)D2 SERPL-MCNC: 42 NG/ML (ref 30–96)
ALBUMIN SERPL BCP-MCNC: 2.9 G/DL (ref 3.5–5.2)
ALBUMIN SERPL BCP-MCNC: 3.3 G/DL (ref 3.5–5.2)
ALP SERPL-CCNC: 64 U/L (ref 55–135)
ALP SERPL-CCNC: 73 U/L (ref 55–135)
ALP SERPL-CCNC: 73 U/L (ref 55–135)
ALT SERPL W/O P-5'-P-CCNC: 18 U/L (ref 10–44)
ALT SERPL W/O P-5'-P-CCNC: 20 U/L (ref 10–44)
ANION GAP SERPL CALC-SCNC: 12 MMOL/L (ref 8–16)
ANION GAP SERPL CALC-SCNC: 9 MMOL/L (ref 8–16)
AST SERPL-CCNC: 26 U/L (ref 10–40)
AST SERPL-CCNC: 28 U/L (ref 10–40)
BASOPHILS # BLD AUTO: 0.12 K/UL (ref 0–0.2)
BASOPHILS NFR BLD: 1.5 % (ref 0–1.9)
BILIRUB SERPL-MCNC: 0.4 MG/DL (ref 0.1–1)
BILIRUB SERPL-MCNC: 0.5 MG/DL (ref 0.1–1)
BUN SERPL-MCNC: 29 MG/DL (ref 8–23)
BUN SERPL-MCNC: 33 MG/DL (ref 8–23)
CALCIUM SERPL-MCNC: 8.7 MG/DL (ref 8.7–10.5)
CALCIUM SERPL-MCNC: 9.8 MG/DL (ref 8.7–10.5)
CHLORIDE SERPL-SCNC: 105 MMOL/L (ref 95–110)
CHLORIDE SERPL-SCNC: 105 MMOL/L (ref 95–110)
CO2 SERPL-SCNC: 21 MMOL/L (ref 23–29)
CO2 SERPL-SCNC: 26 MMOL/L (ref 23–29)
CREAT SERPL-MCNC: 0.8 MG/DL (ref 0.5–1.4)
CREAT SERPL-MCNC: 0.9 MG/DL (ref 0.5–1.4)
DIFFERENTIAL METHOD: ABNORMAL
EOSINOPHIL # BLD AUTO: 0.4 K/UL (ref 0–0.5)
EOSINOPHIL NFR BLD: 4.8 % (ref 0–8)
ERYTHROCYTE [DISTWIDTH] IN BLOOD BY AUTOMATED COUNT: 23 % (ref 11.5–14.5)
EST. GFR  (NO RACE VARIABLE): >60 ML/MIN/1.73 M^2
EST. GFR  (NO RACE VARIABLE): >60 ML/MIN/1.73 M^2
GLUCOSE SERPL-MCNC: 75 MG/DL (ref 70–110)
GLUCOSE SERPL-MCNC: 84 MG/DL (ref 70–110)
HCT VFR BLD AUTO: 28.4 % (ref 37–48.5)
HGB BLD-MCNC: 8.8 G/DL (ref 12–16)
IMM GRANULOCYTES # BLD AUTO: 0.06 K/UL (ref 0–0.04)
IMM GRANULOCYTES NFR BLD AUTO: 0.8 % (ref 0–0.5)
LYMPHOCYTES # BLD AUTO: 1.7 K/UL (ref 1–4.8)
LYMPHOCYTES NFR BLD: 21.3 % (ref 18–48)
MAGNESIUM SERPL-MCNC: 1.7 MG/DL (ref 1.6–2.6)
MCH RBC QN AUTO: 31.2 PG (ref 27–31)
MCHC RBC AUTO-ENTMCNC: 31 G/DL (ref 32–36)
MCV RBC AUTO: 101 FL (ref 82–98)
MONOCYTES # BLD AUTO: 1.6 K/UL (ref 0.3–1)
MONOCYTES NFR BLD: 20.3 % (ref 4–15)
NEUTROPHILS # BLD AUTO: 4 K/UL (ref 1.8–7.7)
NEUTROPHILS NFR BLD: 51.3 % (ref 38–73)
NRBC BLD-RTO: 0 /100 WBC
PHOSPHATE SERPL-MCNC: 3.9 MG/DL (ref 2.7–4.5)
PLATELET # BLD AUTO: 345 K/UL (ref 150–450)
PMV BLD AUTO: 11.3 FL (ref 9.2–12.9)
POTASSIUM SERPL-SCNC: 3.3 MMOL/L (ref 3.5–5.1)
POTASSIUM SERPL-SCNC: 3.7 MMOL/L (ref 3.5–5.1)
PROT SERPL-MCNC: 5.8 G/DL (ref 6–8.4)
PROT SERPL-MCNC: 6.6 G/DL (ref 6–8.4)
PTH-INTACT SERPL-MCNC: 52.1 PG/ML (ref 9–77)
RBC # BLD AUTO: 2.82 M/UL (ref 4–5.4)
SODIUM SERPL-SCNC: 138 MMOL/L (ref 136–145)
SODIUM SERPL-SCNC: 140 MMOL/L (ref 136–145)
T4 FREE SERPL-MCNC: 0.93 NG/DL (ref 0.71–1.51)
TSH SERPL DL<=0.005 MIU/L-ACNC: 1.14 UIU/ML (ref 0.4–4)
WBC # BLD AUTO: 7.78 K/UL (ref 3.9–12.7)

## 2023-06-12 PROCEDURE — 3077F SYST BP >= 140 MM HG: CPT | Mod: CPTII,S$GLB,, | Performed by: NURSE PRACTITIONER

## 2023-06-12 PROCEDURE — 99024 POSTOP FOLLOW-UP VISIT: CPT | Mod: S$GLB,,, | Performed by: NURSE PRACTITIONER

## 2023-06-12 PROCEDURE — 84443 ASSAY THYROID STIM HORMONE: CPT | Performed by: NURSE PRACTITIONER

## 2023-06-12 PROCEDURE — 84100 ASSAY OF PHOSPHORUS: CPT | Performed by: HOSPITALIST

## 2023-06-12 PROCEDURE — 3044F PR MOST RECENT HEMOGLOBIN A1C LEVEL <7.0%: ICD-10-PCS | Mod: CPTII,S$GLB,, | Performed by: NURSE PRACTITIONER

## 2023-06-12 PROCEDURE — 83970 ASSAY OF PARATHORMONE: CPT | Performed by: NURSE PRACTITIONER

## 2023-06-12 PROCEDURE — 99024 PR POST-OP FOLLOW-UP VISIT: ICD-10-PCS | Mod: S$GLB,,, | Performed by: NURSE PRACTITIONER

## 2023-06-12 PROCEDURE — 99999 PR PBB SHADOW E&M-EST. PATIENT-LVL V: CPT | Mod: PBBFAC,,, | Performed by: NURSE PRACTITIONER

## 2023-06-12 PROCEDURE — 80053 COMPREHEN METABOLIC PANEL: CPT | Performed by: NURSE PRACTITIONER

## 2023-06-12 PROCEDURE — 85025 COMPLETE CBC W/AUTO DIFF WBC: CPT | Performed by: HOSPITALIST

## 2023-06-12 PROCEDURE — 3079F DIAST BP 80-89 MM HG: CPT | Mod: CPTII,S$GLB,, | Performed by: NURSE PRACTITIONER

## 2023-06-12 PROCEDURE — 3044F HG A1C LEVEL LT 7.0%: CPT | Mod: CPTII,S$GLB,, | Performed by: NURSE PRACTITIONER

## 2023-06-12 PROCEDURE — 97116 GAIT TRAINING THERAPY: CPT

## 2023-06-12 PROCEDURE — 25000003 PHARM REV CODE 250: Performed by: NURSE PRACTITIONER

## 2023-06-12 PROCEDURE — 80053 COMPREHEN METABOLIC PANEL: CPT | Mod: 91 | Performed by: HOSPITALIST

## 2023-06-12 PROCEDURE — 1125F PR PAIN SEVERITY QUANTIFIED, PAIN PRESENT: ICD-10-PCS | Mod: CPTII,S$GLB,, | Performed by: NURSE PRACTITIONER

## 2023-06-12 PROCEDURE — 36415 COLL VENOUS BLD VENIPUNCTURE: CPT | Performed by: HOSPITALIST

## 2023-06-12 PROCEDURE — 1159F PR MEDICATION LIST DOCUMENTED IN MEDICAL RECORD: ICD-10-PCS | Mod: CPTII,S$GLB,, | Performed by: NURSE PRACTITIONER

## 2023-06-12 PROCEDURE — 1160F RVW MEDS BY RX/DR IN RCRD: CPT | Mod: CPTII,S$GLB,, | Performed by: NURSE PRACTITIONER

## 2023-06-12 PROCEDURE — 3077F PR MOST RECENT SYSTOLIC BLOOD PRESSURE >= 140 MM HG: ICD-10-PCS | Mod: CPTII,S$GLB,, | Performed by: NURSE PRACTITIONER

## 2023-06-12 PROCEDURE — 25000242 PHARM REV CODE 250 ALT 637 W/ HCPCS: Performed by: HOSPITALIST

## 2023-06-12 PROCEDURE — 99999 PR PBB SHADOW E&M-EST. PATIENT-LVL V: ICD-10-PCS | Mod: PBBFAC,,, | Performed by: NURSE PRACTITIONER

## 2023-06-12 PROCEDURE — 25000003 PHARM REV CODE 250: Performed by: HOSPITALIST

## 2023-06-12 PROCEDURE — 1125F AMNT PAIN NOTED PAIN PRSNT: CPT | Mod: CPTII,S$GLB,, | Performed by: NURSE PRACTITIONER

## 2023-06-12 PROCEDURE — 82306 VITAMIN D 25 HYDROXY: CPT | Performed by: NURSE PRACTITIONER

## 2023-06-12 PROCEDURE — 3079F PR MOST RECENT DIASTOLIC BLOOD PRESSURE 80-89 MM HG: ICD-10-PCS | Mod: CPTII,S$GLB,, | Performed by: NURSE PRACTITIONER

## 2023-06-12 PROCEDURE — 1160F PR REVIEW ALL MEDS BY PRESCRIBER/CLIN PHARMACIST DOCUMENTED: ICD-10-PCS | Mod: CPTII,S$GLB,, | Performed by: NURSE PRACTITIONER

## 2023-06-12 PROCEDURE — 1159F MED LIST DOCD IN RCRD: CPT | Mod: CPTII,S$GLB,, | Performed by: NURSE PRACTITIONER

## 2023-06-12 PROCEDURE — 83735 ASSAY OF MAGNESIUM: CPT | Performed by: HOSPITALIST

## 2023-06-12 PROCEDURE — 84439 ASSAY OF FREE THYROXINE: CPT | Performed by: NURSE PRACTITIONER

## 2023-06-12 PROCEDURE — 36415 COLL VENOUS BLD VENIPUNCTURE: CPT | Performed by: NURSE PRACTITIONER

## 2023-06-12 RX ADMIN — APIXABAN 2.5 MG: 2.5 TABLET, FILM COATED ORAL at 08:06

## 2023-06-12 RX ADMIN — POLYETHYLENE GLYCOL 3350 17 G: 17 POWDER, FOR SOLUTION ORAL at 08:06

## 2023-06-12 RX ADMIN — AMLODIPINE BESYLATE 5 MG: 5 TABLET ORAL at 08:06

## 2023-06-12 RX ADMIN — Medication 2 TABLET: at 08:06

## 2023-06-12 RX ADMIN — CHOLECALCIFEROL TAB 25 MCG (1000 UNIT) 2000 UNITS: 25 TAB at 08:06

## 2023-06-12 RX ADMIN — PANTOPRAZOLE SODIUM 40 MG: 40 TABLET, DELAYED RELEASE ORAL at 08:06

## 2023-06-12 RX ADMIN — METHOCARBAMOL 500 MG: 500 TABLET ORAL at 12:06

## 2023-06-12 RX ADMIN — FLUTICASONE PROPIONATE 50 MCG: 50 SPRAY, METERED NASAL at 08:06

## 2023-06-12 RX ADMIN — SENNOSIDES AND DOCUSATE SODIUM 1 TABLET: 50; 8.6 TABLET ORAL at 08:06

## 2023-06-12 RX ADMIN — ALLOPURINOL 200 MG: 100 TABLET ORAL at 08:06

## 2023-06-12 RX ADMIN — ATORVASTATIN CALCIUM 80 MG: 40 TABLET, FILM COATED ORAL at 08:06

## 2023-06-12 RX ADMIN — CELECOXIB 200 MG: 200 CAPSULE ORAL at 08:06

## 2023-06-12 RX ADMIN — CARVEDILOL 6.25 MG: 3.12 TABLET, FILM COATED ORAL at 08:06

## 2023-06-12 RX ADMIN — METHOCARBAMOL 500 MG: 500 TABLET ORAL at 08:06

## 2023-06-12 NOTE — PLAN OF CARE
Patient discharged to home with son in law. Reviewed medication changes, follow up appointments, diet order and mobility restrictions. Signed for home health equipment.     Problem: Adult Inpatient Plan of Care  Goal: Plan of Care Review  Outcome: Ongoing, Progressing  Goal: Patient-Specific Goal (Individualized)  Outcome: Ongoing, Progressing  Goal: Absence of Hospital-Acquired Illness or Injury  Outcome: Ongoing, Progressing  Goal: Optimal Comfort and Wellbeing  Outcome: Ongoing, Progressing  Goal: Readiness for Transition of Care  Outcome: Ongoing, Progressing     Problem: Fall Injury Risk  Goal: Absence of Fall and Fall-Related Injury  Outcome: Ongoing, Progressing     Problem: Skin Injury Risk Increased  Goal: Skin Health and Integrity  Outcome: Ongoing, Progressing

## 2023-06-12 NOTE — PT/OT/SLP PROGRESS
Physical Therapy Treatment/Discharge Summary    Patient Name:  Jessica Floyd   MRN:  [id number]  Admit Date: 5/31/2023  Admitting Diagnosis: Left displaced femoral neck fracture  Recent Surgeries: TOTAL HIP ARTHROPLASTY (Left)    General Precautions: Standard, fall, vision impaired (pt with L vision field cut from a previous aneurysm)  Orthopedic Precautions: LLE weight bearing as tolerated, LLE posterior precautions  Braces: N/A    Recommendations:     Discharge Recommendations: home health PT  Level of Assistance Recommended at Discharge: Intermittent supervision  Discharge Equipment Recommendations: bedside commode, walker, rolling  Barriers to discharge: Inaccessible home environment, Decreased caregiver support    Assessment:     Jessica Floyd is a 72 y.o. female admitted with a medical diagnosis of Left displaced femoral neck fracture . Pt is appropriate for d/c home today.      Performance deficits affecting function: weakness, impaired endurance, impaired self care skills, impaired functional mobility, gait instability, impaired balance, impaired cardiopulmonary response to activity, orthopedic precautions.    Rehab Potential is good    Activity Tolerance: Good    Plan:     Patient to be seen 5 x/week to address the above listed problems via gait training, therapeutic activities, therapeutic exercises, wheelchair management/training    Plan of Care Expires: 07/01/23  Plan of Care Reviewed with: patient    Subjective     Pt. Agreeable to work with PT.     Pain/Comfort:  Pain Rating 1: 0/10  Pain Rating Post-Intervention 1: 0/10    Patient's cultural, spiritual, Rastafari conflicts given the current situation:  no    Objective:     Communicated with pt's nurse prior to session.  Patient found up in chair with   upon PT entry to room.       Functional Mobility:  Transfers:     Sit to Stand:  supervision with no AD and 4 wheeled walker  Bed to Chair: supervision with  no AD  using  Stand Pivot  Gait: >300ft +!50ft  "with rollator and (S) for safety  Stairs:  Pt ascended/descended 8 stair(s) and 4" curb step with 4 wheeled walker with bilateral handrails with Supervision or Set-up Assistance and Minimal Assistance.     AM-PAC 6 CLICK MOBILITY  23    Patient left up in chair with call button in reach.    GOALS:   Multidisciplinary Problems       Physical Therapy Goals          Problem: Physical Therapy    Goal Priority Disciplines Outcome Goal Variances Interventions   Physical Therapy Goal     PT, PT/OT Ongoing, Progressing     Description: Goals to be met by: 7/1/23     Patient will increase functional independence with mobility by performing:    . Supine to sit with Set-up [place]  . Sit to supine with Set-up [place]  . Rolling to Left and Right with Set-up Assistance.  . Sit to stand transfer with Supervision  . Bed to chair transfer with Supervision using Rolling Walker/ LRAD  . Gait  x 150 feet with Supervision using Rolling Walker/LRAD  . Wheelchair propulsion x150 feet with Contact Guard Assistance using bilateral uppper extremities  . Ascend/descend 12 stair with bilateral Handrails Stand-by Assistance using No Assistive Device. met  . Ascend/Descend 4 inch curb step with Stand-by Assistance using Rolling Walker/ LRAD. met                         Time Tracking:     PT Received On: 06/12/23  PT Start Time: 0905  PT Stop Time: 0929  PT Total Time (min): 24 min    Billable Minutes: Gait Training 24    Treatment Type: Treatment  PT/PTA: PT     Number of PTA visits since last PT visit: 0     06/12/2023  "

## 2023-06-12 NOTE — PROGRESS NOTES
Ms. Floyd is here today for a post-operative visit after undergoing an Lt IDA by Dr. Wan on 5/28/2023.    Interval History:  She reports that she is doing well.  She reports no pain.  She is not taking pain medication.  She was seen at [facility] but is being discharged home.    She denies fever, chills, and sweats since the time of the surgery.     Physical exam:  Dressing taken down.  Incision is clean, dry and intact.  No signs of infection seen.  Skin was closed with Dermabond and Stratifix ends were clipped.  She has tactile stimulation to their lower leg, she denies calf pain, there is no leg edema and their pedal pulse is palpable x 2.     RADS: Left hip xray was obtained and personally reviewed by me, findings show left IDA appears to be in good position.  No evidence of hardware failure or new fractures seen.    Assessment:  Post-op visit (2 weeks)    Plan:    ICD-10-CM ICD-9-CM   1. Left displaced femoral neck fracture s/p Lt IDA 5/28/23.  S72.002A 820.8   2. Post-operative state  Z98.890 V45.89   3. Localized osteoporosis ([place])  M81.6 733.09   4. Disorder of bone density and structure, unspecified  M85.9 733.90     Current care, treatment plan, precautions, activity level/ modifications, limitations, rehabilitation exercises and proposed future treatment were discussed with the patient. We discussed the need to monitor for changes in symptoms and condition and report them to the physician.  Discussed importance of compliance with all appointments and follow up examinations.     WOUND CARE ORDERS  -Jessica was advised to keep the incision clean and dry for the next 24 hours after which she may wash the area with antibacterial soap in the shower.   -She not submerge until the incision is completely healed  -Patient was advised to monitor wound closely and multiple times daily for any problems. Call clinic immediately or report to ED for immediate medical attention for any complications including  reopening of wound, drainage, purulence, redness, streaking, odor, pain out of proportion, fever, chills, etc.   - If there are signs of infection, please call Orth[facility] Clinic [phone] for further instructions and to make appt to be seen.       PHYSICAL THERAPY:   - Continue therapy as ordered.  - Weight bearing as tolerated   - Range of motion left posterior hip precautions x 6 weeks.     PAIN MEDICATION:   - Pain medication: refill was not needed  - Pain medication refill policy provided to patient for review, yes.    - Patient was informed a multi-modal approach is used to treat their pain.     DVT PROPHYLAXIS:   - Eliquis 2.5 mg bid x 2 more weeks    FRAGILITY:  - Will refer to fragility clinic, in the meantime, will order all labs and DEXA scan.     FOLLOW UP:   - Patient will follow up in the clinic in 4 weeks.  - X-ray of her Left hip is needed.    - Future Appointments:   Future Appointments   Date Time Provider Department Center   6/15/2023 11:00 AM [facility] DEXA1 [facility] BMD [facility]   6/22/2023  8:30 AM CHEMO 08 [facility] CHEMO [facility]   7/11/2023  9:30 AM Mckay Cosby NP [facility] ORTHO [address]   8/7/2023 10:30 AM Armani Cai PA-C [facility]   8/16/2023 11:00 AM [facility]   8/18/2023  1:30 PM Anh Martinez MD [facility] HEM ONC [facility]   8/22/2023 10:30 AM Juan Naranjo MD [facility] RMTLGY [place]   3/5/2024  9:00 AM Michael Lal MD [facility] [id number] [facility]           If there are any questions prior to scheduled follow up, the patient was instructed to contact the office

## 2023-06-12 NOTE — PLAN OF CARE
Patient loss appeal, finical liability would began today if patient doesn't discharge. Patient was concerned about a safe discharge r/t immediate family being out of town and her having to discharge to home alone. This AM resident stating her son in law is going to be able to take her after her follow up appointment. Patient set up th [facility] to come out and admit tomorrow. Awaiting approval for DME from billing department at this writing.

## 2023-06-13 ENCOUNTER — EXTERNAL HOSPITAL ADMISSION (OUTPATIENT)
Dept: ADMINISTRATIVE | Facility: CLINIC | Age: 73
End: 2023-06-13
Payer: MEDICARE

## 2023-06-13 ENCOUNTER — PATIENT MESSAGE (OUTPATIENT)
Dept: PRIMARY CARE CLINIC | Facility: CLINIC | Age: 73
End: 2023-06-13
Payer: MEDICARE

## 2023-06-13 PROCEDURE — G0180 PR HOME HEALTH MD CERTIFICATION: ICD-10-PCS | Mod: ,,, | Performed by: HOSPITALIST

## 2023-06-13 PROCEDURE — G0180 MD CERTIFICATION HHA PATIENT: HCPCS | Mod: ,,, | Performed by: HOSPITALIST

## 2023-06-13 NOTE — HOSPITAL COURSE
Patient progressed well with PT and OT- last PT note states that patient ambulated~140+110 ft with stand by assistance and rolling walker.. Patient had no significant events during their stay at SNF. Home health was set up. DME was ordered if needed. Follow up appointment to be made by patient within one week. All prescriptions and discharge instructions were ordered to be given to the patient prior to discharge.

## 2023-06-13 NOTE — DISCHARGE SUMMARY
[facility]  Discharge Summary      Patient Name: Jessica Floyd  MRN: [id number]  LENORA: [id number]  Patient Class: IP- SNF  Admission Date: 5/31/2023  Hospital Length of Stay: 12 days  Discharge Date: 6/12/2023  Attending Physician: Renetta att. 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 Wan.  Admission to SNF for secondary weakness and debility.     Patient originally presented to [facility] ED on 05/27 for evaluation of left hip and leg pain after a mechanical fall from standing height.  Per [facility], Pt states that she was walking to bathroom and tripped over a folding table and fell to hardwood floor. Patient landed on left hip with immediate severe left hip and leg pain. The fall witnessed by her  who called EMS as patient was unable to get up or bear any weight on left side.  Imaging showed acute mildly displaced left femoral neck fracture. Patient seen by orthopedic with plan for surgical fixation.  ED course: afebrile and vitals stable. Imaging showed acute mildly displaced left femoral neck fracture. EKG NSR with Q waves in septal leads VI-VII, TWI V4-6 (old). Labs significant for WBC 14, K 2.4, Mg 1.4 and phos 2.2. Patient received morphine, zofran, toradol, 40 meq oral KCL, magnesium sulfate and 1L NS bolus in ED. During my interview patient is awake and her  is present at bedside. She reports severe muscle spasm and left hip pain with minimal movement. Patient states at baseline she cane or walker sometime to avoid falls. She goes to physical therapy for degenerative R shoulder rotator  "cuff tear with improving strength and range of motion. She walks around the house with her 5 year old granddaughter without chest pain or dyspnea." Patient taken to the OR on 05/28 for Left total hip arthroplasty for femoral neck fracture.  No intraop complications noted,  mL, skin closed with Dermabond/prineo.  Patient will be weight-bearing as tolerated with posterior hip precautions to LLE x6 weeks.     Today, patient states that the oxycodone makes her very nauseous, she states that she tolerates hydrocodone better.  Patient is requesting double portions of meet with meals and pork with breakfast.  Daughter would like her to be seen by a psychologist, referral placed and daughter notified to call to schedule an appointment.     Patient will be treated at [facility] with PT and OT to improve functional status and ability to perform ADLs.     Hospital Course:   Patient progressed well with PT and OT- last PT note states that patient ambulated~140+110 ft with stand by assistance and rolling walker.. Patient had no significant events during their stay at [facility]. Home health was set up. DME was ordered if needed. Follow up appointment to be made by patient within one week. All prescriptions and discharge instructions were ordered to be given to the patient prior to discharge.        PEx  Constitutional: Patient appears debilitated.  No distress noted  HENT:   Head: Normocephalic and atraumatic.   Eyes: Pupils are equal, round  Neck: Normal range of motion. Neck supple.   Cardiovascular: Normal rate, regular rhythm and normal heart sounds.    Pulmonary/Chest: Effort normal and breath sounds are clear  Abdominal: Soft. Bowel sounds are normal.   Musculoskeletal: Normal range of motion.   Neurological: Alert and oriented to person, place, and time.   Psychiatric: Normal mood and affect. Behavior is normal.   Skin: Skin is warm and dry. Full skin assessment completed.  Surgical dressing to LLE, only to removed by " "Ortho  Goals of Care Treatment Preferences:  Code Status: Full Code      Consults:   Consults (From admission, onward)        Status Ordering Provider     Inpatient consult to Registered Dietitian/Nutritionist  Once        Provider:  (Not yet assigned)    EMIL Barker new Assessment & Plan notes have been filed under this hospital service since the last note was generated.  Service: Hospital Medicine    Final Active Diagnoses:    Diagnosis Date Noted POA    PRINCIPAL PROBLEM:  Left displaced femoral neck fracture [S72.002A] 05/27/2023 Yes    Malnutrition of mild degree [E44.1] 06/01/2023 Yes    Acute blood loss anemia [D62] 05/29/2023 Yes    Iron deficiency anemia secondary to blood loss (chronic) [D50.0] 04/17/2023 Yes    Localized primary osteoarthritis of right shoulder region [M19.011 (ICD-10-CM)] [M19.011] 02/15/2023 Yes    Allergic rhinitis [J30.9] 10/15/2018 Yes    Hypertension [I10]  Yes    Stage 3a chronic kidney disease [N18.31] 05/16/2017 Yes    Gastroesophageal reflux disease without esophagitis [K21.9] 03/20/2017 Yes    Coronary artery disease involving native coronary artery of native heart without angina pectoris [I25.10] 08/24/2016 Yes    Dyslipidemia [E78.5] 08/24/2016 Yes      Problems Resolved During this Admission:    Diagnosis Date Noted Date Resolved POA    Hyponatremia [E87.1] 06/02/2023 06/09/2023 Yes       Discharged Condition: stable    Disposition: Home-Health Care c    Follow Up:    Patient Instructions:      WALKER FOR HOME USE     Order Specific Question Answer Comments   Type of Walker: Adult (5'4"-6'6")    With wheels? Yes    Height: 5' 4" (1.626 m)    Weight: 62.9 kg (138 lb 10.7 oz)    Length of need (1-99 months): 99    Does patient have medical equipment at home? cane, straight    Does patient have medical equipment at home? raised toilet    Please check all that apply: Patient's condition impairs ambulation.    Please check all that " "apply: Patient is unable to safely ambulate without equipment.    Please check all that apply: Patient needs help to get in and out of chair.      3 IN 1 COMMODE FOR HOME USE     Order Specific Question Answer Comments   Type: Standard    Height: 5' 4" (1.626 m)    Weight: 62.9 kg (138 lb 10.7 oz)    Does patient have medical equipment at home? cane, straight    Does patient have medical equipment at home? raised toilet    Length of need (1-99 months): 99      Ambulatory referral/consult to Psychology   Standing Status: Future   Referral Priority: Routine Referral Type: Psychiatric   Referral Reason: Specialty Services Required   Requested Specialty: Psychology   Number of Visits Requested: 1     Diet Adult Regular     Notify your health care provider if you experience any of the following:  temperature >100.4     Notify your health care provider if you experience any of the following:  persistent nausea and vomiting or diarrhea     Notify your health care provider if you experience any of the following:  severe uncontrolled pain     Notify your health care provider if you experience any of the following:  difficulty breathing or increased cough     Notify your health care provider if you experience any of the following:  persistent dizziness, light-headedness, or visual disturbances     Notify your health care provider if you experience any of the following:  increased confusion or weakness     Leave dressing on - Keep it clean, dry, and intact until clinic visit     Activity as tolerated       Significant Diagnostic Studies: N/A    Pending Diagnostic Studies:     None         Medications:  Reconciled Home Medications:      Medication List      START taking these medications    senna-docusate 8.6-50 mg 8.6-50 mg per tablet  Commonly known as: PERICOLACE  Take 1 tablet by mouth 2 (two) times daily.        CHANGE how you take these medications    amLODIPine 5 MG tablet  Commonly known as: NORVASC  Take 1 tablet (5 mg " total) by mouth once daily.  What changed:   · medication strength  · how much to take     carvediloL 6.25 MG tablet  Commonly known as: COREG  Take 1 tablet (6.25 mg total) by mouth 2 (two) times daily with meals.  What changed:   · medication strength  · how much to take        CONTINUE taking these medications    allopurinoL 100 MG tablet  Commonly known as: ZYLOPRIM  Take 2 tablets (200 mg total) by mouth once daily.     apixaban 2.5 mg Tab  Commonly known as: ELIQUIS  Take 1 tablet (2.5 mg total) by mouth 2 (two) times daily. End date July 3, 2023     atorvastatin 80 MG tablet  Commonly known as: LIPITOR  TAKE 1 TABLET BY MOUTH EVERY DAY     calcium-vitamin D3 500 mg-5 mcg (200 unit) per tablet  Commonly known as: OS-WHITNEY 500 + D3  Take 2 tablets by mouth once daily.     celecoxib 200 MG capsule  Commonly known as: CeleBREX  Take 1 capsule (200 mg total) by mouth once daily.     cholecalciferol (vitamin D3) 50 mcg (2,000 unit) Tab  Commonly known as: VITAMIN D3  Take 1 tablet (2,000 Units total) by mouth once daily.     fluticasone propionate 50 mcg/actuation nasal spray  Commonly known as: FLONASE  SPRAY ONCE INTO EACH NOSTRIL ONCE DAILY     melatonin 3 mg tablet  Commonly known as: MELATIN  Take 2 tablets (6 mg total) by mouth nightly as needed for Insomnia.     methocarbamoL 500 MG Tab  Commonly known as: ROBAXIN  Take 1 tablet (500 mg total) by mouth 4 (four) times daily.     montelukast 10 mg tablet  Commonly known as: SINGULAIR  Take 1 tablet (10 mg total) by mouth every evening.     oxyCODONE 5 MG immediate release tablet  Commonly known as: ROXICODONE  Take 1 tablet (5 mg total) by mouth every 4 (four) hours as needed (pain scale 7-10).     pantoprazole 40 MG tablet  Commonly known as: PROTONIX  TAKE 1 TABLET BY MOUTH EVERY DAY     polyethylene glycol 17 gram Pwpk  Commonly known as: GLYCOLAX  Take 17 g by mouth once daily.     pregabalin 75 MG capsule  Commonly known as: LYRICA  Take 1 capsule (75 mg  total) by mouth every evening.        STOP taking these medications    cefadroxil 1 gram tablet  Commonly known as: DURICEF            Indwelling Lines/Drains at time of discharge:   Lines/Drains/Airways     None                 Time spent on the discharge of patient: 39 minutes         Alyssia Ambrose NP  Department of Hospital Medicine  [facility]

## 2023-06-14 DIAGNOSIS — S72.002A LEFT DISPLACED FEMORAL NECK FRACTURE: Primary | ICD-10-CM

## 2023-06-16 ENCOUNTER — OFFICE VISIT (OUTPATIENT)
Dept: HOME HEALTH SERVICES | Facility: CLINIC | Age: 73
End: 2023-06-16
Payer: MEDICARE

## 2023-06-16 DIAGNOSIS — S72.002A LEFT DISPLACED FEMORAL NECK FRACTURE: Primary | ICD-10-CM

## 2023-06-16 PROCEDURE — 1126F AMNT PAIN NOTED NONE PRSNT: CPT | Mod: CPTII,S$GLB,, | Performed by: NURSE PRACTITIONER

## 2023-06-16 PROCEDURE — 3078F DIAST BP <80 MM HG: CPT | Mod: CPTII,S$GLB,, | Performed by: NURSE PRACTITIONER

## 2023-06-16 PROCEDURE — 1160F RVW MEDS BY RX/DR IN RCRD: CPT | Mod: CPTII,S$GLB,, | Performed by: NURSE PRACTITIONER

## 2023-06-16 PROCEDURE — 1159F PR MEDICATION LIST DOCUMENTED IN MEDICAL RECORD: ICD-10-PCS | Mod: CPTII,S$GLB,, | Performed by: NURSE PRACTITIONER

## 2023-06-16 PROCEDURE — 3044F PR MOST RECENT HEMOGLOBIN A1C LEVEL <7.0%: ICD-10-PCS | Mod: CPTII,S$GLB,, | Performed by: NURSE PRACTITIONER

## 2023-06-16 PROCEDURE — 3075F SYST BP GE 130 - 139MM HG: CPT | Mod: CPTII,S$GLB,, | Performed by: NURSE PRACTITIONER

## 2023-06-16 PROCEDURE — 99496 TRANSITIONAL CARE MANAGE SERVICE 7 DAY DISCHARGE: ICD-10-PCS | Mod: S$GLB,,, | Performed by: NURSE PRACTITIONER

## 2023-06-16 PROCEDURE — 1111F DSCHRG MED/CURRENT MED MERGE: CPT | Mod: CPTII,S$GLB,, | Performed by: NURSE PRACTITIONER

## 2023-06-16 PROCEDURE — 1160F PR REVIEW ALL MEDS BY PRESCRIBER/CLIN PHARMACIST DOCUMENTED: ICD-10-PCS | Mod: CPTII,S$GLB,, | Performed by: NURSE PRACTITIONER

## 2023-06-16 PROCEDURE — 1101F PR PT FALLS ASSESS DOC 0-1 FALLS W/OUT INJ PAST YR: ICD-10-PCS | Mod: CPTII,S$GLB,, | Performed by: NURSE PRACTITIONER

## 2023-06-16 PROCEDURE — 3078F PR MOST RECENT DIASTOLIC BLOOD PRESSURE < 80 MM HG: ICD-10-PCS | Mod: CPTII,S$GLB,, | Performed by: NURSE PRACTITIONER

## 2023-06-16 PROCEDURE — 1111F PR DISCHARGE MEDS RECONCILED W/ CURRENT OUTPATIENT MED LIST: ICD-10-PCS | Mod: CPTII,S$GLB,, | Performed by: NURSE PRACTITIONER

## 2023-06-16 PROCEDURE — 3288F FALL RISK ASSESSMENT DOCD: CPT | Mod: CPTII,S$GLB,, | Performed by: NURSE PRACTITIONER

## 2023-06-16 PROCEDURE — 1126F PR PAIN SEVERITY QUANTIFIED, NO PAIN PRESENT: ICD-10-PCS | Mod: CPTII,S$GLB,, | Performed by: NURSE PRACTITIONER

## 2023-06-16 PROCEDURE — 3288F PR FALLS RISK ASSESSMENT DOCUMENTED: ICD-10-PCS | Mod: CPTII,S$GLB,, | Performed by: NURSE PRACTITIONER

## 2023-06-16 PROCEDURE — 3044F HG A1C LEVEL LT 7.0%: CPT | Mod: CPTII,S$GLB,, | Performed by: NURSE PRACTITIONER

## 2023-06-16 PROCEDURE — 1101F PT FALLS ASSESS-DOCD LE1/YR: CPT | Mod: CPTII,S$GLB,, | Performed by: NURSE PRACTITIONER

## 2023-06-16 PROCEDURE — 1159F MED LIST DOCD IN RCRD: CPT | Mod: CPTII,S$GLB,, | Performed by: NURSE PRACTITIONER

## 2023-06-16 PROCEDURE — 99496 TRANSJ CARE MGMT HIGH F2F 7D: CPT | Mod: S$GLB,,, | Performed by: NURSE PRACTITIONER

## 2023-06-16 PROCEDURE — 3075F PR MOST RECENT SYSTOLIC BLOOD PRESS GE 130-139MM HG: ICD-10-PCS | Mod: CPTII,S$GLB,, | Performed by: NURSE PRACTITIONER

## 2023-06-20 VITALS
RESPIRATION RATE: 14 BRPM | DIASTOLIC BLOOD PRESSURE: 76 MMHG | SYSTOLIC BLOOD PRESSURE: 138 MMHG | TEMPERATURE: 97 F | HEART RATE: 78 BPM | OXYGEN SATURATION: 97 %

## 2023-06-20 RX ORDER — CELECOXIB 200 MG/1
200 CAPSULE ORAL DAILY
Qty: 15 CAPSULE | Refills: 0 | Status: SHIPPED | OUTPATIENT
Start: 2023-06-20 | End: 2023-06-20

## 2023-06-20 RX ORDER — FERROUS SULFATE, DRIED 160(50) MG
2 TABLET, EXTENDED RELEASE ORAL DAILY
Qty: 15 TABLET | Refills: 0 | Status: SHIPPED | OUTPATIENT
Start: 2023-06-20 | End: 2023-10-24

## 2023-06-20 NOTE — ASSESSMENT & PLAN NOTE
--reports pain well controlled  --unsure of home health company but has RN/PT/OT in the home  --ambulates with cane  --photo wound in note  --plans to attend follow-up appointment with surgeon; has adequate transportation endorsed

## 2023-06-20 NOTE — PROGRESS NOTES
[facility]  Transition of Care Home Visit    Visit Date: 6/16/2023  Encounter Provider: Jan Suárez   PCP:  Effie Olmedo MD    PRESENTING HISTORY      Patient ID: Jessica Floyd is a 72 y.o. female.    Consult Requested By:  No ref. provider found  Reason for Consult:  Hospital Follow Up.    Jessica is being seen at home due to being seen at home due to physical debility that presents a taxing effort to leave the home, to mitigate high risk of hospital readmission and/or due to the limited availability of reliable or safe options for transportation to the point of access to the provider. Prior to treatment on this visit the chart was reviewed and patient verbal consent was obtained.      Chief Complaint: Transitional Care        History of Present Illness: Ms. Jessica Floyd is a 72 y.o. female who was recently admitted to the hospital.    HPI:    Jessica Floyd is a 72 year old female with PMHx of HTN, CKD stage 3, CAD, SAH 2/2 aneurysm s/p clipping, right shoulder DJD and rotator cuff tear, GERD who presents to SNF following hospitalization for left femoral neck fracture s/p left hip total arthroplasty on 05/28 with Dr. Wan.  Admission to SNF for secondary weakness and debility.     Patient originally presented to [facility] ED on 05/27 for evaluation of left hip and leg pain after a mechanical fall from standing height.  Per [facility], Pt states that she was walking to bathroom and tripped over a folding table and fell to hardwood floor. Patient landed on left hip with immediate severe left hip and leg pain. The fall witnessed by her  who called EMS as patient was unable to get up or bear any weight on left side.  Imaging showed acute mildly displaced left femoral neck fracture. Patient seen by orthopedic with plan for surgical fixation.  ED course: afebrile and vitals stable. Imaging showed acute mildly displaced left femoral neck fracture. EKG NSR with Q waves in septal leads VI-VII, TWI V4-6 (old). Labs  "significant for WBC 14, K 2.4, Mg 1.4 and phos 2.2. Patient received morphine, zofran, toradol, 40 meq oral KCL, magnesium sulfate and 1L NS bolus in ED. During my interview patient is awake and her  is present at bedside. She reports severe muscle spasm and left hip pain with minimal movement. Patient states at baseline she cane or walker sometime to avoid falls. She goes to physical therapy for degenerative R shoulder rotator cuff tear with improving strength and range of motion. She walks around the house with her 5 year old granddaughter without chest pain or dyspnea." Patient taken to the OR on 05/28 for Left total hip arthroplasty for femoral neck fracture.  No intraop complications noted,  mL, skin closed with Dermabond/prineo.  Patient will be weight-bearing as tolerated with posterior hip precautions to LLE x6 weeks.     Today, patient states that the oxycodone makes her very nauseous, she states that she tolerates hydrocodone better.  Patient is requesting double portions of meet with meals and pork with breakfast.  Daughter would like her to be seen by a psychologist, referral placed and daughter notified to call to schedule an appointment.     Patient will be treated at [facility] with PT and OT to improve functional status and ability to perform ADLs.      Hospital Course:   Patient progressed well with PT and OT- last PT note states that patient ambulated~140+110 ft with stand by assistance and rolling walker.. Patient had no significant events during their stay at SNF. Home health was set up. DME was ordered if needed. Follow up appointment to be made by patient within one week. All prescriptions and discharge instructions were ordered to be given to the patient prior to discharge.   ___________________________________________________________________    Today: With this visit today patient is found sitting in her recliner with her  remotely present for the visit. Patient is " AAOx3, able to verify her name and .  Currently receiving home health, however she is unsure of the name of the company and does have a folder at this time. Patient endorses ability to perform ADls. Endorses eating x 3 meals per day, daily BMs, and adequate sleep pattern. Denies smoking, alcohol abuse, illicit drug use. No additional needs at this this time. All of my information was given to the patient and family if any questions or concerns arise.    Surgical wound as follows:           VSS. Denies fever, chest pain, shortness of breath, nausea, vomiting, diarrhea. Risks of environmental exposure to coronavirus discussed including: social distancing, hand hygiene, and limiting departures from the home for necessities only.  Reports understanding and willingness to comply.  All hospital discharge orders reviewed and being followed, all medications reconciled and reviewed, patient and family verbalized understanding. No other needs identified at this time.     Review of Systems   Constitutional:  Negative for activity change and appetite change.   HENT:  Negative for congestion and dental problem.    Eyes:  Negative for discharge and itching.   Respiratory:  Negative for choking and chest tightness.    Cardiovascular:  Negative for chest pain and palpitations.   Gastrointestinal:  Negative for rectal pain and vomiting.   Endocrine: Negative for cold intolerance and heat intolerance.   Genitourinary:  Negative for enuresis and flank pain.   Musculoskeletal:  Positive for gait problem. Negative for myalgias and neck pain.   Skin:  Negative for color change and wound.   Allergic/Immunologic: Negative for environmental allergies and food allergies.   Neurological:  Positive for weakness. Negative for tremors and syncope.   Hematological:  Does not bruise/bleed easily.   Psychiatric/Behavioral:  Negative for decreased concentration and dysphoric mood.        PAST HISTORY:     Past Medical History:   Diagnosis Date     Allergic rhinitis     Amblyopia     Carotid stenosis     Coronary atherosclerosis     Outside [facility] 2014: 20% mLAD, 50% mCx, 20%, mRCA    Dyslipidemia     ESBL (extended spectrum beta-lactamase) producing bacteria infection     UTI    Hypertension     Nausea and vomiting 2017    Subarachnoid hemorrhage due to ruptured aneurysm        Past Surgical History:   Procedure Laterality Date    ADENOIDECTOMY      ANTERIOR CRUCIATE LIGAMENT REPAIR      APPENDECTOMY      CATARACT EXTRACTION W/  INTRAOCULAR LENS IMPLANT Right 2022    Procedure: EXTRACTION, CATARACT, WITH IOL INSERTION;  Surgeon: Higinio Cristina MD;  Location: [facility] OR;  Service: Ophthalmology;  Laterality: Right;    CATARACT EXTRACTION W/  INTRAOCULAR LENS IMPLANT Left 2022    Procedure: EXTRACTION, CATARACT, WITH IOL INSERTION;  Surgeon: Higinio Cristina MD;  Location: [facility];  Service: Ophthalmology;  Laterality: Left;    CEREBRAL ANEURYSM REPAIR      clip     SECTION      DENTAL SURGERY      EYE SURGERY Bilateral     cataract removal and lens implants    HIP ARTHROPLASTY Left 2023    Procedure: TOTAL HIP ARTHROPLASTY;  Surgeon: Juan Wan MD;  Location: [address];  Service: Orthopedics;  Laterality: Left;    TONSILLECTOMY         Family History   Problem Relation Age of Onset    Hypertension Mother     Aneurysm Mother     Hypertension Father     Alcohol abuse Father     Kidney disease Brother     Heart disease Brother     Gout Brother     No Known Problems Daughter     No Known Problems Daughter     No Known Problems Daughter        Social History     Socioeconomic History    Marital status:    Occupational History    Occupation: Retired   Tobacco Use    Smoking status: Former     Packs/day: 0.50     Years: 20.00     Pack years: 10.00     Types: Cigarettes     Quit date: 1999     Years since quittin.4     Passive exposure: Past    Smokeless tobacco: Never   Substance and Sexual  Activity    Alcohol use: Yes     Alcohol/week: 7.0 standard drinks     Types: 7 Glasses of wine per week    Drug use: No    Sexual activity: Yes     Partners: Male   Social History Narrative    .  Has 3 grown daughters.   lives in [place].       Social Determinants of Health     Financial Resource Strain: Low Risk     Difficulty of Paying Living Expenses: Not very hard   Food Insecurity: No Food Insecurity    Worried About Running Out of Food in the Last Year: Never true    Ran Out of Food in the Last Year: Never true   Transportation Needs: No Transportation Needs    Lack of Transportation (Medical): No    Lack of Transportation (Non-Medical): No   Physical Activity: Sufficiently Active    Days of Exercise per Week: 7 days    Minutes of Exercise per Session: 60 min   Stress: No Stress Concern Present    Feeling of Stress : Only a little   Social Connections: Socially Integrated    Frequency of Communication with Friends and Family: More than three times a week    Frequency of Social Gatherings with Friends and Family: More than three times a week    Attends [employer] Services: 1 to 4 times per year    Active Member of Clubs or Organizations: Yes    Attends Club or Organization Meetings: More than 4 times per year    Marital Status:    Housing Stability: Low Risk     Unable to Pay for Housing in the Last Year: No    Number of Places Lived in the Last Year: 1    Unstable Housing in the Last Year: No       MEDICATIONS & ALLERGIES:     Current Outpatient Medications on File Prior to Visit   Medication Sig Dispense Refill    allopurinoL (ZYLOPRIM) 100 MG tablet Take 2 tablets (200 mg total) by mouth once daily. 60 tablet 11    amLODIPine (NORVASC) 5 MG tablet Take 1 tablet (5 mg total) by mouth once daily. 30 tablet 3    apixaban (ELIQUIS) 2.5 mg Tab Take 1 tablet (2.5 mg total) by mouth 2 (two) times daily. End date July 3, 2023 60 tablet 1    atorvastatin (LIPITOR) 80 MG tablet TAKE 1 TABLET BY  MOUTH EVERY DAY 90 tablet 3    carvediloL (COREG) 6.25 MG tablet Take 1 tablet (6.25 mg total) by mouth 2 (two) times daily with meals. 60 tablet 11    fluticasone propionate (FLONASE) 50 mcg/actuation nasal spray SPRAY ONCE INTO EACH NOSTRIL ONCE DAILY 16 mL 3    melatonin (MELATIN) 3 mg tablet Take 2 tablets (6 mg total) by mouth nightly as needed for Insomnia.  0    methocarbamoL (ROBAXIN) 500 MG Tab Take 1 tablet (500 mg total) by mouth 4 (four) times daily. 40 tablet 0    montelukast (SINGULAIR) 10 mg tablet Take 1 tablet (10 mg total) by mouth every evening. 90 tablet 11    oxyCODONE (ROXICODONE) 5 MG immediate release tablet Take 1 tablet (5 mg total) by mouth every 4 (four) hours as needed (pain scale 7-10). (Patient not taking: Reported on 6/13/2023)  0    pantoprazole (PROTONIX) 40 MG tablet TAKE 1 TABLET BY MOUTH EVERY DAY 90 tablet 0    polyethylene glycol (GLYCOLAX) 17 gram PwPk Take 17 g by mouth once daily.  0    pregabalin (LYRICA) 75 MG capsule Take 1 capsule (75 mg total) by mouth every evening. 30 capsule 6    senna-docusate 8.6-50 mg (PERICOLACE) 8.6-50 mg per tablet Take 1 tablet by mouth 2 (two) times daily. 60 tablet 3    vitamin D (VITAMIN D3) 50 mcg (2,000 unit) Tab Take 1 tablet (2,000 Units total) by mouth once daily.      [DISCONTINUED] calcium-vitamin D3 (OS-WHITNEY 500 + D3) 500 mg-5 mcg (200 unit) per tablet Take 2 tablets by mouth once daily. 60 tablet 11    [DISCONTINUED] celecoxib (CELEBREX) 200 MG capsule Take 1 capsule (200 mg total) by mouth once daily.       No current facility-administered medications on file prior to visit.        Review of patient's allergies indicates:  No Known Allergies    OBJECTIVE:     Vital Signs:  Vitals:    06/20/23 0930   BP: 138/76   Pulse: 78   Resp: 14   Temp: 96.6 °F (35.9 °C)     There is no height or weight on file to calculate BMI.     Physical Exam:  Physical Exam  Vitals reviewed.   Constitutional:       Appearance: She is well-developed.    HENT:      Head: Normocephalic and atraumatic.   Eyes:      Pupils: Pupils are equal, round, and reactive to light.   Cardiovascular:      Rate and Rhythm: Normal rate.   Pulmonary:      Effort: Pulmonary effort is normal.      Breath sounds: Normal breath sounds.   Abdominal:      General: Bowel sounds are normal.      Palpations: Abdomen is soft.   Musculoskeletal:         General: Normal range of motion.      Cervical back: Normal range of motion and neck supple.   Skin:     General: Skin is warm and dry.      Comments: Left femur surgical wound dry, intact   Neurological:      Mental Status: She is alert. Mental status is at baseline.      GCS: GCS eye subscore is 4. GCS verbal subscore is 5. GCS motor subscore is 6.   Psychiatric:         Attention and Perception: Attention normal.         Mood and Affect: Mood normal.         Speech: Speech normal.     Laboratory  Lab Results   Component Value Date    WBC 7.78 06/12/2023    HGB 8.8 (L) 06/12/2023    HCT 28.4 (L) 06/12/2023     (H) 06/12/2023     06/12/2023     Lab Results   Component Value Date    INR 1.0 05/26/2023     Lab Results   Component Value Date    HGBA1C 4.9 05/26/2023     No results for input(s): POCTGLUCOSE in the last 72 hours.      TRANSITION OF CARE:     [facility] Contact Note: 6/13/23    Family and/or Caretaker present at visit?  Yes.  Diagnostic tests reviewed/disposition: No diagnosic tests pending after this hospitalization.  Disease/illness education: Importance of compliance with all prescribed medication and treatments, COVID precautions/Social Distancing/Mask Use  Home health/community services discussion/referrals: Patient has home health established at unknown .   Establishment or re-establishment of referral orders for community resources: No other necessary community resources.   Discussion with other health care providers: No discussion with other health care providers necessary.     Transition of Care  Visit:  I have reviewed and updated the history and problem list.  I have reconciled the medication list.  I have discussed the hospitalization and current medical issues, prognosis and plans with the patient/family.  I  spent more than 50% of time discussing the care with the patient/family.  Total Face-to-Face Encounter: 60 minutes.    Medications Reconciliation:   I have reconciled the patient's home medications and discharge medications with the patient/family. I have updated all changes.  Refer to After-Visit Medication List.    I have discussed discharge plans, follow-up instructions, future appointments, provider contact information, indicators to seek medical emergency treatment, and advisement to call with additional questions or concerns. Patient and caregiver verbalize understanding.    ASSESSMENT & PLAN:       1. Left displaced femoral neck fracture  Assessment & Plan:  --reports pain well controlled  --unsure of home health company but has RN/PT/OT in the home  --ambulates with cane  --photo wound in note  --plans to attend follow-up appointment with surgeon; has adequate transportation endorsed      Other orders  -     calcium-vitamin D3 (OS-WHITNEY 500 + D3) 500 mg-5 mcg (200 unit) per tablet; Take 2 tablets by mouth once daily.  Dispense: 15 tablet; Refill: 0  -     celecoxib (CELEBREX) 200 MG capsule; Take 1 capsule (200 mg total) by mouth once daily.  Dispense: 15 capsule; Refill: 0         Were controlled substances prescribed?  No    Instructions for the patient:    Scheduled Follow-up :  Future Appointments   Date Time Provider Department Center   6/22/2023  8:30 AM CHEMO [address] [place] [address] CHEMO [facility]   7/11/2023  9:00 AM [facility] FRACTURE CARE CLINIC [facility] FRA CAR [facility]   7/11/2023  9:30 AM Mckay Cosby NP [facility] ORTHO [facility]   8/7/2023 10:30 AM Armani Cai PA-C [facility]   8/16/2023 11:00 AM [facility]   8/18/2023  1:30 PM Anh Martinez MD [facility]  HEM ONC [facility]   8/22/2023 10:30 AM Juan Naranjo MD [facility] [id number] [place]   3/5/2024  9:00 AM Michael Lal MD [facility] [id number] [facility]   6/17/2024  9:00 AM [facility] DEXA1 [facility] BMD [facility]       After Visit Medication List :     Medication List            Accurate as of June 16, 2023 11:59 PM. If you have any questions, ask your nurse or doctor.                CONTINUE taking these medications      allopurinoL 100 MG tablet  Commonly known as: ZYLOPRIM  Take 2 tablets (200 mg total) by mouth once daily.     amLODIPine 5 MG tablet  Commonly known as: NORVASC  Take 1 tablet (5 mg total) by mouth once daily.     apixaban 2.5 mg Tab  Commonly known as: ELIQUIS  Take 1 tablet (2.5 mg total) by mouth 2 (two) times daily. End date July 3, 2023     atorvastatin 80 MG tablet  Commonly known as: LIPITOR  TAKE 1 TABLET BY MOUTH EVERY DAY     calcium-vitamin D3 500 mg-5 mcg (200 unit) per tablet  Commonly known as: OS-WHITNEY 500 + D3  Take 2 tablets by mouth once daily.     carvediloL 6.25 MG tablet  Commonly known as: COREG  Take 1 tablet (6.25 mg total) by mouth 2 (two) times daily with meals.     celecoxib 200 MG capsule  Commonly known as: CeleBREX  Take 1 capsule (200 mg total) by mouth once daily.     cholecalciferol (vitamin D3) 50 mcg (2,000 unit) Tab  Commonly known as: VITAMIN D3  Take 1 tablet (2,000 Units total) by mouth once daily.     fluticasone propionate 50 mcg/actuation nasal spray  Commonly known as: FLONASE  SPRAY ONCE INTO EACH NOSTRIL ONCE DAILY     melatonin 3 mg tablet  Commonly known as: MELATIN  Take 2 tablets (6 mg total) by mouth nightly as needed for Insomnia.     methocarbamoL 500 MG Tab  Commonly known as: ROBAXIN  Take 1 tablet (500 mg total) by mouth 4 (four) times daily.     montelukast 10 mg tablet  Commonly known as: SINGULAIR  Take 1 tablet (10 mg total) by mouth every evening.     oxyCODONE 5 MG immediate release tablet  Commonly known as: ROXICODONE  Take 1 tablet (5 mg  total) by mouth every 4 (four) hours as needed (pain scale 7-10).     pantoprazole 40 MG tablet  Commonly known as: PROTONIX  TAKE 1 TABLET BY MOUTH EVERY DAY     polyethylene glycol 17 gram Pwpk  Commonly known as: GLYCOLAX  Take 17 g by mouth once daily.     pregabalin 75 MG capsule  Commonly known as: LYRICA  Take 1 capsule (75 mg total) by mouth every evening.     senna-docusate 8.6-50 mg 8.6-50 mg per tablet  Commonly known as: PERICOLACE  Take 1 tablet by mouth 2 (two) times daily.               Where to Get Your Medications        These medications were sent to [facility]/pharmacy #[id number] - [place], [place] - [address] [place] [address]  [address] [address]      Phone: [phone]   calcium-vitamin D3 500 mg-5 mcg (200 unit) per tablet  celecoxib 200 MG capsule         Signature: Jan Suárez NP

## 2023-06-22 ENCOUNTER — INFUSION (OUTPATIENT)
Dept: INFUSION THERAPY | Facility: OTHER | Age: 73
End: 2023-06-22
Attending: STUDENT IN AN ORGANIZED HEALTH CARE EDUCATION/TRAINING PROGRAM
Payer: MEDICARE

## 2023-06-22 VITALS
DIASTOLIC BLOOD PRESSURE: 62 MMHG | OXYGEN SATURATION: 97 % | RESPIRATION RATE: 16 BRPM | TEMPERATURE: 98 F | HEART RATE: 83 BPM | SYSTOLIC BLOOD PRESSURE: 105 MMHG

## 2023-06-22 DIAGNOSIS — D50.0 IRON DEFICIENCY ANEMIA SECONDARY TO BLOOD LOSS (CHRONIC): Primary | ICD-10-CM

## 2023-06-22 PROCEDURE — 63600175 PHARM REV CODE 636 W HCPCS: Performed by: STUDENT IN AN ORGANIZED HEALTH CARE EDUCATION/TRAINING PROGRAM

## 2023-06-22 PROCEDURE — 25000003 PHARM REV CODE 250: Performed by: STUDENT IN AN ORGANIZED HEALTH CARE EDUCATION/TRAINING PROGRAM

## 2023-06-22 PROCEDURE — 96365 THER/PROPH/DIAG IV INF INIT: CPT

## 2023-06-22 RX ORDER — HEPARIN 100 UNIT/ML
500 SYRINGE INTRAVENOUS
Status: CANCELLED | OUTPATIENT
Start: 2023-06-29

## 2023-06-22 RX ORDER — DIPHENHYDRAMINE HYDROCHLORIDE 50 MG/ML
50 INJECTION INTRAMUSCULAR; INTRAVENOUS ONCE AS NEEDED
Status: CANCELLED | OUTPATIENT
Start: 2023-06-29

## 2023-06-22 RX ORDER — SODIUM CHLORIDE 0.9 % (FLUSH) 0.9 %
10 SYRINGE (ML) INJECTION
Status: CANCELLED | OUTPATIENT
Start: 2023-06-29

## 2023-06-22 RX ORDER — METHYLPREDNISOLONE SOD SUCC 125 MG
125 VIAL (EA) INJECTION ONCE AS NEEDED
Status: DISCONTINUED | OUTPATIENT
Start: 2023-06-22 | End: 2023-06-22 | Stop reason: HOSPADM

## 2023-06-22 RX ORDER — DIPHENHYDRAMINE HYDROCHLORIDE 50 MG/ML
50 INJECTION INTRAMUSCULAR; INTRAVENOUS ONCE AS NEEDED
Status: DISCONTINUED | OUTPATIENT
Start: 2023-06-22 | End: 2023-06-22 | Stop reason: HOSPADM

## 2023-06-22 RX ORDER — EPINEPHRINE 0.3 MG/.3ML
0.3 INJECTION SUBCUTANEOUS ONCE AS NEEDED
Status: CANCELLED | OUTPATIENT
Start: 2023-06-29

## 2023-06-22 RX ORDER — METHYLPREDNISOLONE SOD SUCC 125 MG
125 VIAL (EA) INJECTION ONCE AS NEEDED
Status: CANCELLED | OUTPATIENT
Start: 2023-06-29

## 2023-06-22 RX ORDER — HEPARIN 100 UNIT/ML
500 SYRINGE INTRAVENOUS
Status: DISCONTINUED | OUTPATIENT
Start: 2023-06-22 | End: 2023-06-22 | Stop reason: HOSPADM

## 2023-06-22 RX ORDER — EPINEPHRINE 0.3 MG/.3ML
0.3 INJECTION SUBCUTANEOUS ONCE AS NEEDED
Status: DISCONTINUED | OUTPATIENT
Start: 2023-06-22 | End: 2023-06-22 | Stop reason: HOSPADM

## 2023-06-22 RX ORDER — SODIUM CHLORIDE 0.9 % (FLUSH) 0.9 %
10 SYRINGE (ML) INJECTION
Status: DISCONTINUED | OUTPATIENT
Start: 2023-06-22 | End: 2023-06-22 | Stop reason: HOSPADM

## 2023-06-22 RX ADMIN — IRON SUCROSE 200 MG: 20 INJECTION, SOLUTION INTRAVENOUS at 09:06

## 2023-06-22 NOTE — PLAN OF CARE
Patient tolerated her iron infusion of Venofer. VSS. NAD. Patient reports being weak. Discussed discharge instruction and her next appointment. No other questions asked. Patient discharged to home via w/c. Escort provided.

## 2023-06-28 DIAGNOSIS — E78.5 DYSLIPIDEMIA: ICD-10-CM

## 2023-06-28 RX ORDER — ATORVASTATIN CALCIUM 80 MG/1
TABLET, FILM COATED ORAL
Qty: 90 TABLET | Refills: 3 | Status: SHIPPED | OUTPATIENT
Start: 2023-06-28

## 2023-06-28 NOTE — TELEPHONE ENCOUNTER
Refill Encounter    PCP Visits: Recent Visits  Date Type Provider Dept   03/28/23 Office Visit Effie Olmedo MD [facility]   03/14/23 Office Visit Effie Olmedo MD [facility]   Showing recent visits within past 360 days and meeting all other requirements  Future Appointments  No visits were found meeting these conditions.  Showing future appointments within next 720 days and meeting all other requirements     Last 3 Blood Pressure:   BP Readings from Last 3 Encounters:   06/22/23 105/62   06/20/23 138/76   06/12/23 (!) 172/86     Preferred Pharmacy:   [facility]/pharmacy #[id number] - [place], [place] - [address]  [address] [address]  Phone: [phone] Fax: [phone]    [facility] Mail [facility] - [facility] [address]  [address] [address]  Phone: [phone] Fax: [phone]    Requested RX:  Requested Prescriptions     Pending Prescriptions Disp Refills    atorvastatin (LIPITOR) 80 MG tablet [Pharmacy Med Name: ATORVASTATIN 80 MG TABLET] 90 tablet 3     Sig: TAKE 1 TABLET BY MOUTH EVERY DAY      RX Route: Normal

## 2023-06-28 NOTE — TELEPHONE ENCOUNTER
No care due was identified.  [facility] Embedded Care Due Messages. Reference number: [id number].   6/28/2023 7:16:38 AM CDT

## 2023-06-29 ENCOUNTER — TELEPHONE (OUTPATIENT)
Dept: INFUSION THERAPY | Facility: OTHER | Age: 73
End: 2023-06-29
Payer: MEDICARE

## 2023-07-03 ENCOUNTER — PATIENT MESSAGE (OUTPATIENT)
Dept: CARDIOLOGY | Facility: CLINIC | Age: 73
End: 2023-07-03
Payer: MEDICARE

## 2023-07-03 DIAGNOSIS — I10 PRIMARY HYPERTENSION: Primary | ICD-10-CM

## 2023-07-03 RX ORDER — HYDROCHLOROTHIAZIDE 25 MG/1
25 TABLET ORAL DAILY
Qty: 90 TABLET | Refills: 3 | Status: SHIPPED | OUTPATIENT
Start: 2023-07-03 | End: 2024-07-02

## 2023-07-05 ENCOUNTER — PATIENT MESSAGE (OUTPATIENT)
Dept: PRIMARY CARE CLINIC | Facility: CLINIC | Age: 73
End: 2023-07-05
Payer: MEDICARE

## 2023-07-05 ENCOUNTER — EXTERNAL HOME HEALTH (OUTPATIENT)
Dept: HOME HEALTH SERVICES | Facility: HOSPITAL | Age: 73
End: 2023-07-05
Payer: MEDICARE

## 2023-07-06 ENCOUNTER — INFUSION (OUTPATIENT)
Dept: INFUSION THERAPY | Facility: OTHER | Age: 73
End: 2023-07-06
Attending: STUDENT IN AN ORGANIZED HEALTH CARE EDUCATION/TRAINING PROGRAM
Payer: MEDICARE

## 2023-07-06 VITALS
HEART RATE: 80 BPM | DIASTOLIC BLOOD PRESSURE: 74 MMHG | RESPIRATION RATE: 16 BRPM | SYSTOLIC BLOOD PRESSURE: 166 MMHG | OXYGEN SATURATION: 96 % | TEMPERATURE: 98 F

## 2023-07-06 DIAGNOSIS — D50.0 IRON DEFICIENCY ANEMIA SECONDARY TO BLOOD LOSS (CHRONIC): Primary | ICD-10-CM

## 2023-07-06 PROCEDURE — 25000003 PHARM REV CODE 250: Mod: HCNC | Performed by: STUDENT IN AN ORGANIZED HEALTH CARE EDUCATION/TRAINING PROGRAM

## 2023-07-06 PROCEDURE — 96374 THER/PROPH/DIAG INJ IV PUSH: CPT | Mod: HCNC

## 2023-07-06 PROCEDURE — 63600175 PHARM REV CODE 636 W HCPCS: Mod: HCNC | Performed by: STUDENT IN AN ORGANIZED HEALTH CARE EDUCATION/TRAINING PROGRAM

## 2023-07-06 RX ORDER — SODIUM CHLORIDE 0.9 % (FLUSH) 0.9 %
10 SYRINGE (ML) INJECTION
Status: CANCELLED | OUTPATIENT
Start: 2023-07-13

## 2023-07-06 RX ORDER — DIPHENHYDRAMINE HYDROCHLORIDE 50 MG/ML
50 INJECTION INTRAMUSCULAR; INTRAVENOUS ONCE AS NEEDED
Status: CANCELLED | OUTPATIENT
Start: 2023-07-13

## 2023-07-06 RX ORDER — DIPHENHYDRAMINE HYDROCHLORIDE 50 MG/ML
50 INJECTION INTRAMUSCULAR; INTRAVENOUS ONCE AS NEEDED
Status: DISCONTINUED | OUTPATIENT
Start: 2023-07-06 | End: 2023-07-06 | Stop reason: HOSPADM

## 2023-07-06 RX ORDER — EPINEPHRINE 0.3 MG/.3ML
0.3 INJECTION SUBCUTANEOUS ONCE AS NEEDED
Status: CANCELLED | OUTPATIENT
Start: 2023-07-13

## 2023-07-06 RX ORDER — METHYLPREDNISOLONE SOD SUCC 125 MG
125 VIAL (EA) INJECTION ONCE AS NEEDED
Status: DISCONTINUED | OUTPATIENT
Start: 2023-07-06 | End: 2023-07-06 | Stop reason: HOSPADM

## 2023-07-06 RX ORDER — EPINEPHRINE 0.3 MG/.3ML
0.3 INJECTION SUBCUTANEOUS ONCE AS NEEDED
Status: DISCONTINUED | OUTPATIENT
Start: 2023-07-06 | End: 2023-07-06 | Stop reason: HOSPADM

## 2023-07-06 RX ORDER — METHYLPREDNISOLONE SOD SUCC 125 MG
125 VIAL (EA) INJECTION ONCE AS NEEDED
Status: CANCELLED | OUTPATIENT
Start: 2023-07-13

## 2023-07-06 RX ORDER — HEPARIN 100 UNIT/ML
500 SYRINGE INTRAVENOUS
Status: DISCONTINUED | OUTPATIENT
Start: 2023-07-06 | End: 2023-07-06 | Stop reason: HOSPADM

## 2023-07-06 RX ORDER — HEPARIN 100 UNIT/ML
500 SYRINGE INTRAVENOUS
Status: CANCELLED | OUTPATIENT
Start: 2023-07-13

## 2023-07-06 RX ORDER — SODIUM CHLORIDE 0.9 % (FLUSH) 0.9 %
10 SYRINGE (ML) INJECTION
Status: DISCONTINUED | OUTPATIENT
Start: 2023-07-06 | End: 2023-07-06 | Stop reason: HOSPADM

## 2023-07-06 RX ADMIN — IRON SUCROSE 200 MG: 20 INJECTION, SOLUTION INTRAVENOUS at 01:07

## 2023-07-06 RX ADMIN — SODIUM CHLORIDE: 9 INJECTION, SOLUTION INTRAVENOUS at 01:07

## 2023-07-06 NOTE — PLAN OF CARE
Vital signs stable. IV placed to left hand. Venofer administered via IV push, tolerated without difficulty, monitored post infusion.  IV removed. Questions answered, AVS/Discharge instructions given.

## 2023-07-11 ENCOUNTER — OFFICE VISIT (OUTPATIENT)
Dept: ORTHOPEDICS | Facility: CLINIC | Age: 73
End: 2023-07-11
Payer: MEDICARE

## 2023-07-11 ENCOUNTER — HOSPITAL ENCOUNTER (OUTPATIENT)
Dept: RADIOLOGY | Facility: HOSPITAL | Age: 73
Discharge: HOME | End: 2023-07-11
Attending: NURSE PRACTITIONER
Payer: MEDICARE

## 2023-07-11 DIAGNOSIS — M80.08XA AGE-RELATED OSTEOPOROSIS WITH CURRENT PATHOLOGICAL FRACTURE, VERTEBRA(E), INITIAL ENCOUNTER FOR FRACTURE: ICD-10-CM

## 2023-07-11 DIAGNOSIS — Z98.890 POST-OPERATIVE STATE: ICD-10-CM

## 2023-07-11 DIAGNOSIS — M80.80XA PATHOLOGICAL FRACTURE DUE TO OSTEOPOROSIS, UNSPECIFIED FRACTURE SITE, UNSPECIFIED OSTEOPOROSIS TYPE, INITIAL ENCOUNTER: Primary | ICD-10-CM

## 2023-07-11 DIAGNOSIS — M81.6 LOCALIZED OSTEOPOROSIS OF LEQUESNE: ICD-10-CM

## 2023-07-11 DIAGNOSIS — M81.0 OSTEOPOROSIS, UNSPECIFIED OSTEOPOROSIS TYPE, UNSPECIFIED PATHOLOGICAL FRACTURE PRESENCE: ICD-10-CM

## 2023-07-11 DIAGNOSIS — R52 PAIN: ICD-10-CM

## 2023-07-11 DIAGNOSIS — S72.002A LEFT DISPLACED FEMORAL NECK FRACTURE: Primary | ICD-10-CM

## 2023-07-11 PROCEDURE — 1159F PR MEDICATION LIST DOCUMENTED IN MEDICAL RECORD: ICD-10-PCS | Mod: HCNC,CPTII,S$GLB, | Performed by: PHYSICIAN ASSISTANT

## 2023-07-11 PROCEDURE — 1159F MED LIST DOCD IN RCRD: CPT | Mod: HCNC,CPTII,S$GLB, | Performed by: NURSE PRACTITIONER

## 2023-07-11 PROCEDURE — 73502 X-RAY EXAM HIP UNI 2-3 VIEWS: CPT | Mod: 26,HCNC,LT, | Performed by: RADIOLOGY

## 2023-07-11 PROCEDURE — 99999 PR PBB SHADOW E&M-EST. PATIENT-LVL III: ICD-10-PCS | Mod: PBBFAC,HCNC,, | Performed by: PHYSICIAN ASSISTANT

## 2023-07-11 PROCEDURE — 99999 PR PBB SHADOW E&M-EST. PATIENT-LVL III: CPT | Mod: PBBFAC,HCNC,, | Performed by: NURSE PRACTITIONER

## 2023-07-11 PROCEDURE — 1160F PR REVIEW ALL MEDS BY PRESCRIBER/CLIN PHARMACIST DOCUMENTED: ICD-10-PCS | Mod: HCNC,CPTII,S$GLB, | Performed by: PHYSICIAN ASSISTANT

## 2023-07-11 PROCEDURE — 73502 X-RAY EXAM HIP UNI 2-3 VIEWS: CPT | Mod: TC,HCNC,LT

## 2023-07-11 PROCEDURE — 1111F DSCHRG MED/CURRENT MED MERGE: CPT | Mod: HCNC,CPTII,S$GLB, | Performed by: PHYSICIAN ASSISTANT

## 2023-07-11 PROCEDURE — 1160F RVW MEDS BY RX/DR IN RCRD: CPT | Mod: HCNC,CPTII,S$GLB, | Performed by: PHYSICIAN ASSISTANT

## 2023-07-11 PROCEDURE — 99024 PR POST-OP FOLLOW-UP VISIT: ICD-10-PCS | Mod: HCNC,S$GLB,, | Performed by: NURSE PRACTITIONER

## 2023-07-11 PROCEDURE — 99214 OFFICE O/P EST MOD 30 MIN: CPT | Mod: 25,HCNC,S$GLB, | Performed by: PHYSICIAN ASSISTANT

## 2023-07-11 PROCEDURE — 73502 XR HIP WITH PELVIS WHEN PERFORMED, 2 OR 3 VIEWS LEFT: ICD-10-PCS | Mod: 26,HCNC,LT, | Performed by: RADIOLOGY

## 2023-07-11 PROCEDURE — 1159F PR MEDICATION LIST DOCUMENTED IN MEDICAL RECORD: ICD-10-PCS | Mod: HCNC,CPTII,S$GLB, | Performed by: NURSE PRACTITIONER

## 2023-07-11 PROCEDURE — 99999 PR PBB SHADOW E&M-EST. PATIENT-LVL III: ICD-10-PCS | Mod: PBBFAC,HCNC,, | Performed by: NURSE PRACTITIONER

## 2023-07-11 PROCEDURE — 3044F HG A1C LEVEL LT 7.0%: CPT | Mod: HCNC,CPTII,S$GLB, | Performed by: NURSE PRACTITIONER

## 2023-07-11 PROCEDURE — 1160F RVW MEDS BY RX/DR IN RCRD: CPT | Mod: HCNC,CPTII,S$GLB, | Performed by: NURSE PRACTITIONER

## 2023-07-11 PROCEDURE — 1159F MED LIST DOCD IN RCRD: CPT | Mod: HCNC,CPTII,S$GLB, | Performed by: PHYSICIAN ASSISTANT

## 2023-07-11 PROCEDURE — 1160F PR REVIEW ALL MEDS BY PRESCRIBER/CLIN PHARMACIST DOCUMENTED: ICD-10-PCS | Mod: HCNC,CPTII,S$GLB, | Performed by: NURSE PRACTITIONER

## 2023-07-11 PROCEDURE — 3044F PR MOST RECENT HEMOGLOBIN A1C LEVEL <7.0%: ICD-10-PCS | Mod: HCNC,CPTII,S$GLB, | Performed by: PHYSICIAN ASSISTANT

## 2023-07-11 PROCEDURE — 99214 PR OFFICE/OUTPT VISIT, EST, LEVL IV, 30-39 MIN: ICD-10-PCS | Mod: 25,HCNC,S$GLB, | Performed by: PHYSICIAN ASSISTANT

## 2023-07-11 PROCEDURE — 99999 PR PBB SHADOW E&M-EST. PATIENT-LVL III: CPT | Mod: PBBFAC,HCNC,, | Performed by: PHYSICIAN ASSISTANT

## 2023-07-11 PROCEDURE — 3044F PR MOST RECENT HEMOGLOBIN A1C LEVEL <7.0%: ICD-10-PCS | Mod: HCNC,CPTII,S$GLB, | Performed by: NURSE PRACTITIONER

## 2023-07-11 PROCEDURE — 3044F HG A1C LEVEL LT 7.0%: CPT | Mod: HCNC,CPTII,S$GLB, | Performed by: PHYSICIAN ASSISTANT

## 2023-07-11 PROCEDURE — 1111F PR DISCHARGE MEDS RECONCILED W/ CURRENT OUTPATIENT MED LIST: ICD-10-PCS | Mod: HCNC,CPTII,S$GLB, | Performed by: PHYSICIAN ASSISTANT

## 2023-07-11 PROCEDURE — 99024 POSTOP FOLLOW-UP VISIT: CPT | Mod: HCNC,S$GLB,, | Performed by: NURSE PRACTITIONER

## 2023-07-11 NOTE — PROGRESS NOTES
SUBJECTIVE:     Chief Complaint & History of Present Illness:  Jessica Floyd is a new patient 72 y.o. female who is seen here today for a bone health evaluation for osteoporosis, fracture prevention, and risk evaluation for future fractures.        she was appropriately identified and referred by Mckay Cosby NP due to concerns for compromised bone quality, and risk of future fractures.  This visit is medically necessary to identify risk, investigate and initiative treatment as appropriate to improve bone quality and strength for the reduction of secondary fractures.     her medical history, medications and fracture history will be reviewed and summarized      Review of patient's allergies indicates:  No Known Allergies      Current Outpatient Medications   Medication Sig Dispense Refill    allopurinoL (ZYLOPRIM) 100 MG tablet Take 2 tablets (200 mg total) by mouth once daily. 60 tablet 11    amLODIPine (NORVASC) 5 MG tablet Take 1 tablet (5 mg total) by mouth once daily. 30 tablet 3    atorvastatin (LIPITOR) 80 MG tablet TAKE 1 TABLET BY MOUTH EVERY DAY 90 tablet 3    calcium-vitamin D3 (OS-WHITNEY 500 + D3) 500 mg-5 mcg (200 unit) per tablet Take 2 tablets by mouth once daily. 15 tablet 0    carvediloL (COREG) 6.25 MG tablet Take 1 tablet (6.25 mg total) by mouth 2 (two) times daily with meals. 60 tablet 11    fluticasone propionate (FLONASE) 50 mcg/actuation nasal spray SPRAY ONCE INTO EACH NOSTRIL ONCE DAILY 16 mL 3    hydroCHLOROthiazide (HYDRODIURIL) 25 MG tablet Take 1 tablet (25 mg total) by mouth once daily. 90 tablet 3    melatonin (MELATIN) 3 mg tablet Take 2 tablets (6 mg total) by mouth nightly as needed for Insomnia.  0    montelukast (SINGULAIR) 10 mg tablet Take 1 tablet (10 mg total) by mouth every evening. 90 tablet 11    pantoprazole (PROTONIX) 40 MG tablet TAKE 1 TABLET BY MOUTH EVERY DAY 90 tablet 0    polyethylene glycol (GLYCOLAX) 17 gram PwPk Take 17 g by mouth once daily.  0     pregabalin (LYRICA) 75 MG capsule Take 1 capsule (75 mg total) by mouth every evening. 30 capsule 6    senna-docusate 8.6-50 mg (PERICOLACE) 8.6-50 mg per tablet Take 1 tablet by mouth 2 (two) times daily. 60 tablet 3    vitamin D (VITAMIN D3) 50 mcg (2,000 unit) Tab Take 1 tablet (2,000 Units total) by mouth once daily.       No current facility-administered medications for this visit.       Past Medical History:   Diagnosis Date    Allergic rhinitis     Amblyopia     Carotid stenosis     Coronary atherosclerosis     [facility] 2014: 20% mLAD, 50% mCx, 20%, mRCA    Dyslipidemia     ESBL (extended spectrum beta-lactamase) producing bacteria infection     UTI    Hypertension     Nausea and vomiting 2017    Subarachnoid hemorrhage due to ruptured aneurysm        Past Surgical History:   Procedure Laterality Date    ADENOIDECTOMY      ANTERIOR CRUCIATE LIGAMENT REPAIR      APPENDECTOMY      CATARACT EXTRACTION W/  INTRAOCULAR LENS IMPLANT Right 2022    Procedure: EXTRACTION, CATARACT, WITH IOL INSERTION;  Surgeon: Higinio Cristina MD;  Location: [facility];  Service: Ophthalmology;  Laterality: Right;    CATARACT EXTRACTION W/  INTRAOCULAR LENS IMPLANT Left 2022    Procedure: EXTRACTION, CATARACT, WITH IOL INSERTION;  Surgeon: Higinio Cristina MD;  Location: [facility];  Service: Ophthalmology;  Laterality: Left;    CEREBRAL ANEURYSM REPAIR      clip     SECTION      DENTAL SURGERY      EYE SURGERY Bilateral     cataract removal and lens implants    HIP ARTHROPLASTY Left 2023    Procedure: TOTAL HIP ARTHROPLASTY;  Surgeon: Juan Wan MD;  Location: [address];  Service: Orthopedics;  Laterality: Left;    TONSILLECTOMY         Lab Results   Component Value Date     2023    K 3.7 2023     2023    CO2 26 2023    GLU 84 2023    BUN 29 (H) 2023    CREATININE 0.8 2023    CALCIUM 9.8 2023    PROT 6.6 2023     ALBUMIN 3.3 (L) 06/12/2023    BILITOT 0.5 06/12/2023    ALKPHOS 73 06/12/2023    ALKPHOS 73 06/12/2023    AST 28 06/12/2023    ALT 20 06/12/2023    ANIONGAP 9 06/12/2023    ESTGFRAFRICA >60.0 07/13/2022    EGFRNONAA 56.4 (A) 07/13/2022      Lab Results   Component Value Date    WBC 7.78 06/12/2023    RBC 2.82 (L) 06/12/2023    HGB 8.8 (L) 06/12/2023    HCT 28.4 (L) 06/12/2023     (H) 06/12/2023    MCH 31.2 (H) 06/12/2023    MCHC 31.0 (L) 06/12/2023    RDW 23.0 (H) 06/12/2023     06/12/2023    MPV 11.3 06/12/2023    GRAN 4.0 06/12/2023    GRAN 51.3 06/12/2023    LYMPH 1.7 06/12/2023    LYMPH 21.3 06/12/2023    MONO 1.6 (H) 06/12/2023    MONO 20.3 (H) 06/12/2023    EOS 0.4 06/12/2023    BASO 0.12 06/12/2023    EOSINOPHIL 4.8 06/12/2023    BASOPHIL 1.5 06/12/2023    DIFFMETHOD Automated 06/12/2023      Lab Results   Component Value Date    MG 1.7 06/12/2023      Lab Results   Component Value Date    PHOS 3.9 06/12/2023      Lab Results   Component Value Date    KLBFDPJV63WJ 42 06/12/2023      Lab Results   Component Value Date    PTH 52.1 06/12/2023      Lab Results   Component Value Date    TSH 1.140 06/12/2023      Lab Results   Component Value Date    FREET4 0.93 06/12/2023      Lab Results   Component Value Date    HGBA1C 4.9 05/26/2023    ESTIMATEDAVG 94 05/26/2023      No results found for: FREETESTOSTE         Vital Signs (Most Recent)  There were no vitals filed for this visit.      Today's Visit and Bone Health History     Have you had any loss of height or gotten shorter since your 20's?  .5   Are you postmenopausal?  Yes   Please indicate how menopause occured. Naturally   Please indicate at what age you became postmenopausal. 49   Have you ever taken any type of hormone replacement therapy? No   Do you currently smoke? No   Have you ever smoked in the past? Yes   If yes, how many years? n/a   How many alcoholic beverages do you drink per day? 1 to 3   How many caffeinated beverages do you  drink per day? 1 to 3   Have you had 2 or more falls in the past 12 months? No   How active have you been in the past 12 months? Somewhat active ( walk up to 1 mile per day )   Did either of your parents have a hip fracture after the age of 50? No   Have you ever been diagnosed with any of the following? Fracture   Do you currently have a fracture? No   Have you broken any other bones since you turned 49 or older? No   Have you ever had a bone density test or DXA scan? Yes   Are you currently taking or have you ever taken any of the following medications? None   Do you take Calcium? No   Do you take Vitamin D? No   Have you ever been diagnosed with cancer? No   Have you ever been treated for cancer with high beam radiation or had radioactive implants? No        she has medical history and medication use known to be associated with bone loss and osteoporosis to include pathological femur fracture.  Iron deficiency anemia GERD malnutrition hypothyroid hyperthyroidism    The last DXA was performed in 05/11/2022.          T-Score Hip: -1.6     T-Score Spine: 2.0      FRAX:      Major Fx.: 17.2%         Hip Fx.: 3.0%      Review of Systems:  ROS:  Constitutional: no fever or chills  Eyes: no visual changes, amblyopia  ENT: no nasal congestion or sore throat  Respiratory: no respiratory symptoms  Cardiovascular: no chest pain or palpitations, hypertension, coronary atherosclerosis coronary artery disease  Gastrointestinal: no nausea or vomiting, tolerating diet, positive for GERD  Genitourinary: no hematuria or dysuria, CKD 3 stage IIIA hypophosphatemia hypo magnesium hypokalemia  Integument/Breast: no rash or pruritis  Hematologic/Lymphatic: no easy bruising or lymphadenopathy, leukocytosis, iron deficiency anemia  Musculoskeletal: no arthralgias or myalgias, positive right rotator cuff tear, pathological left femur fracture  Neurological: no seizures or tremors, positive subarachnoid hemorrhage  Behavioral/Psych: no  auditory or visual hallucinations  Endocrine: no heat or cold intolerance, positive mild malnutrition, hyperthyroidism      OBJECTIVE:     PHYSICAL EXAM:     ,                  General: no acute distress  Behavioral/Psych: normal judgment and insight  Skin: no rash  Head/Neck: atraumatic, normocephalic, without obvious abnormality  Respiratory: normal respiratory effort  Cardiac: regular rate and rhythm  Vascular: normal and pulses present  Abdomen: soft, non-tender  Musculoskeletal: no joint tenderness, deformity or swelling               ASSESSMENT/PLAN:     Assessment:    Osteoporosis, at high risk for future fractures, history of pathological femur fracture.    Plan:   30-45 minutes spent in face-to-face consultation with patient and her family members today, discussing the disease management of osteoporosis, for the reduction of future fractures.  I have explained that bone strength is equal to bone quality. A bone density / DEXA scan is important to complement her care since her fracture was by definition a fragility fracture/traumatic fracture.  Over half of the encounter was spent (50%) counseling the patient on the disease of osteoporosis evidence-based and best practice treatment options available as well as recommendations for improvement of bone quality, the importance of nutritional supplements to include:  Calcium 1914-1102 mg daily in divided doses   Vitamin D3  1340-7969 units daily in divided doses.   Fall prevention and personal safety for the reduction of future fractures.    Clinicians Guidelines for the treatment of Osteoporosis 2020 as part of the National Osteoporosis Foundation were utilized as the evidence-based information provided.    Discussed pharmaceutical treatment options to include Biphosphatases, Denosumab, Abaloparatide and Teriparatide. Information to include indications for therapy, risks and benefits to treatment, and important safety information related to these treatments was  provided and discussed.  Handouts were given to the patient for her review on osteoporosis and other pharmacological treatment information related to other available treatment options.    The importance of diet, impact exercise, and core strengthening with gait and balance exercise, through  both formal physical therapy programs and home exercise programs was discussed.     Bone density test recommended and ordered  Bone health labs recommended and ordered    Repeat DEXA scan see her back following DEXA scan to discuss treatment options

## 2023-07-11 NOTE — PROGRESS NOTES
Ms. Floyd is here today for a post-operative visit after undergoing an Lt IDA by Dr. Wan on 5/28/2023.    Interval History:  She reports that she is doing well.  She reports no pain.  She is not taking pain medication.  She is now home and reported home health physical therapy has discharged her.  She would like to return back to physical therapy with outpatient PT at [facility].   She denies any falls or injuries since last seen in clinic.  She denies fever, chills, and sweats since the time of the surgery.     Physical exam:  Incision is open air and healed.  No signs of infection seen.    She has tactile stimulation to their lower leg, she denies calf pain, there is no leg edema and their pedal pulse is palpable x 2.  Range of motion of the hip is 0-90 degrees.  Patient walks with a antalgic gait with in the clinic room using a straight cane.  She appears to have a week and core strength.    RADS: Left hip xray was obtained and personally reviewed by me, findings show left IDA appears to be in good position.  No evidence of hardware failure or new fractures seen.    Assessment:  Post-op visit (6 weeks)    Plan:    ICD-10-CM ICD-9-CM   1. Left displaced femoral neck fracture  S72.002A 820.8   2. Post-operative state  Z98.890 V45.89     Current care, treatment plan, precautions, activity level/ modifications, limitations, rehabilitation exercises and proposed future treatment were discussed with the patient. We discussed the need to monitor for changes in symptoms and condition and report them to the physician.  Discussed importance of compliance with all appointments and follow up examinations.       PHYSICAL THERAPY:   - I will order outpatient PT with [facility].  - Weight bearing as tolerated.  I will ask therapy to work on core strengthening.    - Range of motion patient may advance range of motion as tolerated but cautioned with posterior hip activities.     PAIN MEDICATION:   - Pain medication: refill was not  needed  - Pain medication refill policy provided to patient for review, yes.    - Patient was informed a multi-modal approach is used to treat their pain.     FRAGILITY:  - Seen in fracture clinic prior to visit today.     FOLLOW UP:   - Patient will follow up in the clinic in 6 weeks.  - X-ray of her Left hip is needed.    - Future Appointments:   Future Appointments   Date Time Provider Department Center   7/26/2023 11:00 AM [facility] DEXA1 [facility]   8/2/2023 10:00 AM Shazia Elkins, PT [facility] OPREHAB [facility]   8/7/2023 10:30 AM Armani Cai PA-C [facility] SPMEDPC [facility]   8/16/2023 11:00 AM [facility] [place] [facility]   8/18/2023  1:40 PM nAh Martinez MD [facility] HEM ONC [facility]   8/22/2023 10:15 AM Mckay Cosby NP [facility] ORTHO [address] [facility]   8/22/2023 10:30 AM Juan Naranjo MD [facility] RMTLGY [place]   3/5/2024  9:00 AM Michael Lal MD [facility] [id number] [facility]   6/17/2024  9:00 AM [facility] DEXA1 [facility]       If there are any questions prior to scheduled follow up, the patient was instructed to contact the office

## 2023-07-24 ENCOUNTER — OFFICE VISIT (OUTPATIENT)
Dept: PODIATRY | Facility: CLINIC | Age: 73
End: 2023-07-24
Payer: MEDICARE

## 2023-07-24 ENCOUNTER — OFFICE VISIT (OUTPATIENT)
Dept: SPORTS MEDICINE | Facility: CLINIC | Age: 73
End: 2023-07-24
Payer: MEDICARE

## 2023-07-24 VITALS
BODY MASS INDEX: 23.05 KG/M2 | SYSTOLIC BLOOD PRESSURE: 150 MMHG | HEIGHT: 64 IN | HEIGHT: 64 IN | HEART RATE: 80 BPM | SYSTOLIC BLOOD PRESSURE: 144 MMHG | BODY MASS INDEX: 23.05 KG/M2 | DIASTOLIC BLOOD PRESSURE: 80 MMHG | WEIGHT: 135 LBS | WEIGHT: 135 LBS | DIASTOLIC BLOOD PRESSURE: 82 MMHG

## 2023-07-24 DIAGNOSIS — M79.671 BILATERAL FOOT PAIN: ICD-10-CM

## 2023-07-24 DIAGNOSIS — N18.31 STAGE 3A CHRONIC KIDNEY DISEASE: Primary | ICD-10-CM

## 2023-07-24 DIAGNOSIS — M79.672 BILATERAL FOOT PAIN: ICD-10-CM

## 2023-07-24 DIAGNOSIS — B35.1 DERMATOPHYTOSIS OF NAIL: ICD-10-CM

## 2023-07-24 DIAGNOSIS — S46.011A TRAUMATIC COMPLETE TEAR OF RIGHT ROTATOR CUFF, INITIAL ENCOUNTER: Primary | ICD-10-CM

## 2023-07-24 DIAGNOSIS — L84 CORN OR CALLUS: ICD-10-CM

## 2023-07-24 PROCEDURE — 1160F RVW MEDS BY RX/DR IN RCRD: CPT | Mod: HCNC,CPTII,S$GLB, | Performed by: PODIATRIST

## 2023-07-24 PROCEDURE — 1159F MED LIST DOCD IN RCRD: CPT | Mod: HCNC,CPTII,S$GLB, | Performed by: PHYSICIAN ASSISTANT

## 2023-07-24 PROCEDURE — 99213 OFFICE O/P EST LOW 20 MIN: CPT | Mod: 25,HCNC,S$GLB, | Performed by: PODIATRIST

## 2023-07-24 PROCEDURE — 99214 PR OFFICE/OUTPT VISIT, EST, LEVL IV, 30-39 MIN: ICD-10-PCS | Mod: HCNC,24,S$GLB, | Performed by: PHYSICIAN ASSISTANT

## 2023-07-24 PROCEDURE — 3079F PR MOST RECENT DIASTOLIC BLOOD PRESSURE 80-89 MM HG: ICD-10-PCS | Mod: HCNC,CPTII,S$GLB, | Performed by: PODIATRIST

## 2023-07-24 PROCEDURE — 99214 OFFICE O/P EST MOD 30 MIN: CPT | Mod: HCNC,24,S$GLB, | Performed by: PHYSICIAN ASSISTANT

## 2023-07-24 PROCEDURE — 1101F PT FALLS ASSESS-DOCD LE1/YR: CPT | Mod: HCNC,CPTII,S$GLB, | Performed by: PHYSICIAN ASSISTANT

## 2023-07-24 PROCEDURE — 1101F PT FALLS ASSESS-DOCD LE1/YR: CPT | Mod: HCNC,CPTII,S$GLB, | Performed by: PODIATRIST

## 2023-07-24 PROCEDURE — 3008F BODY MASS INDEX DOCD: CPT | Mod: HCNC,CPTII,S$GLB, | Performed by: PHYSICIAN ASSISTANT

## 2023-07-24 PROCEDURE — 3288F FALL RISK ASSESSMENT DOCD: CPT | Mod: HCNC,CPTII,S$GLB, | Performed by: PHYSICIAN ASSISTANT

## 2023-07-24 PROCEDURE — 3008F PR BODY MASS INDEX (BMI) DOCUMENTED: ICD-10-PCS | Mod: HCNC,CPTII,S$GLB, | Performed by: PODIATRIST

## 2023-07-24 PROCEDURE — 3288F FALL RISK ASSESSMENT DOCD: CPT | Mod: HCNC,CPTII,S$GLB, | Performed by: PODIATRIST

## 2023-07-24 PROCEDURE — 3044F HG A1C LEVEL LT 7.0%: CPT | Mod: HCNC,CPTII,S$GLB, | Performed by: PODIATRIST

## 2023-07-24 PROCEDURE — 3079F DIAST BP 80-89 MM HG: CPT | Mod: HCNC,CPTII,S$GLB, | Performed by: PODIATRIST

## 2023-07-24 PROCEDURE — 3077F SYST BP >= 140 MM HG: CPT | Mod: HCNC,CPTII,S$GLB, | Performed by: PODIATRIST

## 2023-07-24 PROCEDURE — 3008F PR BODY MASS INDEX (BMI) DOCUMENTED: ICD-10-PCS | Mod: HCNC,CPTII,S$GLB, | Performed by: PHYSICIAN ASSISTANT

## 2023-07-24 PROCEDURE — 3079F PR MOST RECENT DIASTOLIC BLOOD PRESSURE 80-89 MM HG: ICD-10-PCS | Mod: HCNC,CPTII,S$GLB, | Performed by: PHYSICIAN ASSISTANT

## 2023-07-24 PROCEDURE — 1126F AMNT PAIN NOTED NONE PRSNT: CPT | Mod: HCNC,CPTII,S$GLB, | Performed by: PODIATRIST

## 2023-07-24 PROCEDURE — 11056 ROUTINE FOOT CARE: ICD-10-PCS | Mod: Q9,HCNC,S$GLB, | Performed by: PODIATRIST

## 2023-07-24 PROCEDURE — 1101F PR PT FALLS ASSESS DOC 0-1 FALLS W/OUT INJ PAST YR: ICD-10-PCS | Mod: HCNC,CPTII,S$GLB, | Performed by: PODIATRIST

## 2023-07-24 PROCEDURE — 1125F PR PAIN SEVERITY QUANTIFIED, PAIN PRESENT: ICD-10-PCS | Mod: HCNC,CPTII,S$GLB, | Performed by: PHYSICIAN ASSISTANT

## 2023-07-24 PROCEDURE — 11721 DEBRIDE NAIL 6 OR MORE: CPT | Mod: 59,Q9,HCNC,S$GLB | Performed by: PODIATRIST

## 2023-07-24 PROCEDURE — 99999 PR PBB SHADOW E&M-EST. PATIENT-LVL III: CPT | Mod: PBBFAC,HCNC,, | Performed by: PHYSICIAN ASSISTANT

## 2023-07-24 PROCEDURE — 3077F SYST BP >= 140 MM HG: CPT | Mod: HCNC,CPTII,S$GLB, | Performed by: PHYSICIAN ASSISTANT

## 2023-07-24 PROCEDURE — 1159F PR MEDICATION LIST DOCUMENTED IN MEDICAL RECORD: ICD-10-PCS | Mod: HCNC,CPTII,S$GLB, | Performed by: PHYSICIAN ASSISTANT

## 2023-07-24 PROCEDURE — 1160F PR REVIEW ALL MEDS BY PRESCRIBER/CLIN PHARMACIST DOCUMENTED: ICD-10-PCS | Mod: HCNC,CPTII,S$GLB, | Performed by: PODIATRIST

## 2023-07-24 PROCEDURE — 3288F PR FALLS RISK ASSESSMENT DOCUMENTED: ICD-10-PCS | Mod: HCNC,CPTII,S$GLB, | Performed by: PODIATRIST

## 2023-07-24 PROCEDURE — 1125F AMNT PAIN NOTED PAIN PRSNT: CPT | Mod: HCNC,CPTII,S$GLB, | Performed by: PHYSICIAN ASSISTANT

## 2023-07-24 PROCEDURE — 11056 PARNG/CUTG B9 HYPRKR LES 2-4: CPT | Mod: Q9,HCNC,S$GLB, | Performed by: PODIATRIST

## 2023-07-24 PROCEDURE — 1126F PR PAIN SEVERITY QUANTIFIED, NO PAIN PRESENT: ICD-10-PCS | Mod: HCNC,CPTII,S$GLB, | Performed by: PODIATRIST

## 2023-07-24 PROCEDURE — 3044F PR MOST RECENT HEMOGLOBIN A1C LEVEL <7.0%: ICD-10-PCS | Mod: HCNC,CPTII,S$GLB, | Performed by: PODIATRIST

## 2023-07-24 PROCEDURE — 99999 PR PBB SHADOW E&M-EST. PATIENT-LVL III: ICD-10-PCS | Mod: PBBFAC,HCNC,, | Performed by: PHYSICIAN ASSISTANT

## 2023-07-24 PROCEDURE — 3077F PR MOST RECENT SYSTOLIC BLOOD PRESSURE >= 140 MM HG: ICD-10-PCS | Mod: HCNC,CPTII,S$GLB, | Performed by: PHYSICIAN ASSISTANT

## 2023-07-24 PROCEDURE — 3044F PR MOST RECENT HEMOGLOBIN A1C LEVEL <7.0%: ICD-10-PCS | Mod: HCNC,CPTII,S$GLB, | Performed by: PHYSICIAN ASSISTANT

## 2023-07-24 PROCEDURE — 1159F PR MEDICATION LIST DOCUMENTED IN MEDICAL RECORD: ICD-10-PCS | Mod: HCNC,CPTII,S$GLB, | Performed by: PODIATRIST

## 2023-07-24 PROCEDURE — 3008F BODY MASS INDEX DOCD: CPT | Mod: HCNC,CPTII,S$GLB, | Performed by: PODIATRIST

## 2023-07-24 PROCEDURE — 3288F PR FALLS RISK ASSESSMENT DOCUMENTED: ICD-10-PCS | Mod: HCNC,CPTII,S$GLB, | Performed by: PHYSICIAN ASSISTANT

## 2023-07-24 PROCEDURE — 3079F DIAST BP 80-89 MM HG: CPT | Mod: HCNC,CPTII,S$GLB, | Performed by: PHYSICIAN ASSISTANT

## 2023-07-24 PROCEDURE — 99999 PR PBB SHADOW E&M-EST. PATIENT-LVL IV: CPT | Mod: PBBFAC,HCNC,, | Performed by: PODIATRIST

## 2023-07-24 PROCEDURE — 1101F PR PT FALLS ASSESS DOC 0-1 FALLS W/OUT INJ PAST YR: ICD-10-PCS | Mod: HCNC,CPTII,S$GLB, | Performed by: PHYSICIAN ASSISTANT

## 2023-07-24 PROCEDURE — 11721 ROUTINE FOOT CARE: ICD-10-PCS | Mod: 59,Q9,HCNC,S$GLB | Performed by: PODIATRIST

## 2023-07-24 PROCEDURE — 1160F PR REVIEW ALL MEDS BY PRESCRIBER/CLIN PHARMACIST DOCUMENTED: ICD-10-PCS | Mod: HCNC,CPTII,S$GLB, | Performed by: PHYSICIAN ASSISTANT

## 2023-07-24 PROCEDURE — 1159F MED LIST DOCD IN RCRD: CPT | Mod: HCNC,CPTII,S$GLB, | Performed by: PODIATRIST

## 2023-07-24 PROCEDURE — 1160F RVW MEDS BY RX/DR IN RCRD: CPT | Mod: HCNC,CPTII,S$GLB, | Performed by: PHYSICIAN ASSISTANT

## 2023-07-24 PROCEDURE — 99999 PR PBB SHADOW E&M-EST. PATIENT-LVL IV: ICD-10-PCS | Mod: PBBFAC,HCNC,, | Performed by: PODIATRIST

## 2023-07-24 PROCEDURE — 3044F HG A1C LEVEL LT 7.0%: CPT | Mod: HCNC,CPTII,S$GLB, | Performed by: PHYSICIAN ASSISTANT

## 2023-07-24 PROCEDURE — 99213 PR OFFICE/OUTPT VISIT, EST, LEVL III, 20-29 MIN: ICD-10-PCS | Mod: 25,HCNC,S$GLB, | Performed by: PODIATRIST

## 2023-07-24 PROCEDURE — 3077F PR MOST RECENT SYSTOLIC BLOOD PRESSURE >= 140 MM HG: ICD-10-PCS | Mod: HCNC,CPTII,S$GLB, | Performed by: PODIATRIST

## 2023-07-24 RX ORDER — IRON SUCROSE 20 MG/ML
INJECTION, SOLUTION INTRAVENOUS
COMMUNITY
Start: 2023-05-22 | End: 2023-09-01

## 2023-07-24 RX ORDER — CLOTRIMAZOLE 1 G/ML
SOLUTION TOPICAL DAILY
Qty: 30 ML | Refills: 6 | Status: SHIPPED | OUTPATIENT
Start: 2023-07-24 | End: 2023-10-28

## 2023-07-24 RX ORDER — METHYLPREDNISOLONE ACETATE 40 MG/ML
INJECTION, SUSPENSION INTRALESIONAL; INTRAMUSCULAR; INTRASYNOVIAL; SOFT TISSUE
COMMUNITY
Start: 2023-05-01 | End: 2023-09-20

## 2023-07-24 NOTE — PROGRESS NOTES
ESTABLISHED PATIENT    History 7/24/2023:  Jessica returns here today for follow-up evaluation of her right shoulder.  She has been attending physical therapy but continues to have constant right shoulder pain with limited range of motion and weakness.  Of note, she is still recovering from a recent total hip arthroplasty due to femoral neck fracture.    History 5/1/2023:   72 y.o. Female with a history of right shoulder pain who is referred today by Armani Cia PA-C. She is retired and she states she has been in physical therapy for a while for her knee and her shoulder. That has taken up most of her time. She has had a fall. She also had a incident a few months ago where she lifted herself up from a low toilet and she felt a pop in the shoulder. Armani gave her a CSI and she had some relief.         History 4/10/2023:  Jessica returns here today for follow-up evaluation of right shoulder.  She has been undergoing physical therapy for her large rotator cuff tear and glenohumeral joint osteoarthritis.  There has been improvement in her pain and function since her last visit.  She is still having difficulty performing simple ADLs such as cooking and trouble lifting objects overhead, but overall she seems to be very happy with her progress.    History 02/28/2023:  Jessica returns here today for follow-up of her right shoulder.  She has a large rotator cuff tear and underlying osteoarthritis.  She has previously failed a subacromial corticosteroid injection and we are attempting to manage her symptoms conservatively.  She was only able to attend 1 visit with physical therapy due to having uncontrollable epistaxis and having to go to the emergency room and ENT several times over the last 2 weeks.  She complains of having significant right shoulder pain and stiffness.    History 02/06/2023:  Jessica returns here today for follow-up evaluation of her right shoulder.  She was given a subacromial corticosteroid injection at  her last visit.  While attempting to lift herself up from a restroom toilet, she felt a pop in her shoulder and had immediate discomfort.  She was seen in the emergency room for this.  A CT arthrogram was ordered for further evaluation of her rotator cuff.  She is here today to discuss the results.  She has been using a shoulder sling intermittently for pain relief.    History 01/23/2023:  Jessica returns here today for follow-up evaluation of her right shoulder.  She states that the Medrol Dosepak gave her very minimal relief.  She has had worsening pain and weakness since her last visit.  She is having a lot of difficulty getting dressed and sleeping at night.  She has been taking Tylenol p.m. at night which has helped her get some sleep.    Of note, she states that her knee pain has resolved following the Orthovisc series.    History 01/10/2023:  72 y.o. Female with a history of right shoulder pain who began having pain 1 week ago.  She denies having any precipitating injury or trauma but states that she was moving a lot of boxes which may have irritated her shoulder.  She is having a lot of discomfort with movement and at night while sleeping.  She has had difficulty getting dressed due to the pain and is frequently dropping objects due to weakness.          Is affecting ADLs.  Pain is 8/10 at it's worst.        PAST MEDICAL HISTORY    Past Medical History:   Diagnosis Date    Allergic rhinitis     Amblyopia     Carotid stenosis     Coronary atherosclerosis     Outside [facility] 6/2014: 20% mLAD, 50% mCx, 20%, mRCA    Dyslipidemia     ESBL (extended spectrum beta-lactamase) producing bacteria infection     UTI    Hypertension     Nausea and vomiting 05/26/2017    Subarachnoid hemorrhage due to ruptured aneurysm 1999       PAST SURGICAL HISTORY     Past Surgical History:   Procedure Laterality Date    ADENOIDECTOMY      ANTERIOR CRUCIATE LIGAMENT REPAIR  2012    APPENDECTOMY      CATARACT EXTRACTION W/  INTRAOCULAR LENS  IMPLANT Right 2022    Procedure: EXTRACTION, CATARACT, WITH IOL INSERTION;  Surgeon: Higinio Cristina MD;  Location: [facility] OR;  Service: Ophthalmology;  Laterality: Right;    CATARACT EXTRACTION W/  INTRAOCULAR LENS IMPLANT Left 2022    Procedure: EXTRACTION, CATARACT, WITH IOL INSERTION;  Surgeon: Higinio Cristina MD;  Location: [facility];  Service: Ophthalmology;  Laterality: Left;    CEREBRAL ANEURYSM REPAIR      clip     SECTION      DENTAL SURGERY      EYE SURGERY Bilateral     cataract removal and lens implants    HIP ARTHROPLASTY Left 2023    Procedure: TOTAL HIP ARTHROPLASTY;  Surgeon: Juan Wan MD;  Location: [address];  Service: Orthopedics;  Laterality: Left;    TONSILLECTOMY         FAMILY HISTORY    Family History   Problem Relation Age of Onset    Hypertension Mother     Aneurysm Mother     Hypertension Father     Alcohol abuse Father     Kidney disease Brother     Heart disease Brother     Gout Brother     No Known Problems Daughter     No Known Problems Daughter     No Known Problems Daughter        SOCIAL HISTORY    Social History     Socioeconomic History    Marital status:    Occupational History    Occupation: Retired   Tobacco Use    Smoking status: Former     Packs/day: 0.50     Years: 20.00     Pack years: 10.00     Types: Cigarettes     Quit date: 1999     Years since quittin.5     Passive exposure: Past    Smokeless tobacco: Never   Substance and Sexual Activity    Alcohol use: Yes     Alcohol/week: 7.0 standard drinks     Types: 7 Glasses of wine per week    Drug use: No    Sexual activity: Yes     Partners: Male   Social History Narrative    .  Has 3 grown daughters.   lives in [facility].       Social Determinants of Health     Financial Resource Strain: Low Risk     Difficulty of Paying Living Expenses: Not very hard   Food Insecurity: No Food Insecurity    Worried About Running Out of Food in the Last Year: Never  true    Ran Out of Food in the Last Year: Never true   Transportation Needs: No Transportation Needs    Lack of Transportation (Medical): No    Lack of Transportation (Non-Medical): No   Physical Activity: Sufficiently Active    Days of Exercise per Week: 7 days    Minutes of Exercise per Session: 60 min   Stress: No Stress Concern Present    Feeling of Stress : Only a little   Social Connections: Socially Integrated    Frequency of Communication with Friends and Family: More than three times a week    Frequency of Social Gatherings with Friends and Family: More than three times a week    Attends Shinto Services: 1 to 4 times per year    Active Member of Clubs or Organizations: Yes    Attends Club or Organization Meetings: More than 4 times per year    Marital Status:    Housing Stability: Low Risk     Unable to Pay for Housing in the Last Year: No    Number of Places Lived in the Last Year: 1    Unstable Housing in the Last Year: No       MEDICATIONS      Current Outpatient Medications:     allopurinoL (ZYLOPRIM) 100 MG tablet, Take 2 tablets (200 mg total) by mouth once daily., Disp: 60 tablet, Rfl: 11    amLODIPine (NORVASC) 5 MG tablet, Take 1 tablet (5 mg total) by mouth once daily., Disp: 30 tablet, Rfl: 3    atorvastatin (LIPITOR) 80 MG tablet, TAKE 1 TABLET BY MOUTH EVERY DAY, Disp: 90 tablet, Rfl: 3    calcium-vitamin D3 (OS-WHITNEY 500 + D3) 500 mg-5 mcg (200 unit) per tablet, Take 2 tablets by mouth once daily., Disp: 15 tablet, Rfl: 0    carvediloL (COREG) 6.25 MG tablet, Take 1 tablet (6.25 mg total) by mouth 2 (two) times daily with meals., Disp: 60 tablet, Rfl: 11    clotrimazole (LOTRIMIN) 1 % Soln, Apply topically once daily. To affected toenails., Disp: 30 mL, Rfl: 6    DEPO-MEDROL 40 mg/mL injection, , Disp: , Rfl:     fluticasone propionate (FLONASE) 50 mcg/actuation nasal spray, SPRAY ONCE INTO EACH NOSTRIL ONCE DAILY, Disp: 16 mL, Rfl: 3    hydroCHLOROthiazide (HYDRODIURIL) 25 MG tablet,  Take 1 tablet (25 mg total) by mouth once daily., Disp: 90 tablet, Rfl: 3    melatonin (MELATIN) 3 mg tablet, Take 2 tablets (6 mg total) by mouth nightly as needed for Insomnia., Disp: , Rfl: 0    montelukast (SINGULAIR) 10 mg tablet, Take 1 tablet (10 mg total) by mouth every evening., Disp: 90 tablet, Rfl: 11    pantoprazole (PROTONIX) 40 MG tablet, TAKE 1 TABLET BY MOUTH EVERY DAY, Disp: 90 tablet, Rfl: 0    polyethylene glycol (GLYCOLAX) 17 gram PwPk, Take 17 g by mouth once daily., Disp: , Rfl: 0    pregabalin (LYRICA) 75 MG capsule, Take 1 capsule (75 mg total) by mouth every evening., Disp: 30 capsule, Rfl: 6    senna-docusate 8.6-50 mg (PERICOLACE) 8.6-50 mg per tablet, Take 1 tablet by mouth 2 (two) times daily., Disp: 60 tablet, Rfl: 3    VENOFER 100 mg iron/5 mL injection, , Disp: , Rfl:     vitamin D (VITAMIN D3) 50 mcg (2,000 unit) Tab, Take 1 tablet (2,000 Units total) by mouth once daily., Disp: , Rfl:     ALLERGIES     Review of patient's allergies indicates:  No Known Allergies      REVIEW OF SYSTEMS   Constitution: Negative. Negative for chills, fever and night sweats.   HENT: Negative for congestion and headaches.    Eyes: Negative for blurred vision, left vision loss and right vision loss.   Cardiovascular: Negative for chest pain and syncope.   Respiratory: Negative for cough and shortness of breath.    Endocrine: Negative for polydipsia, polyphagia and polyuria.   Hematologic/Lymphatic: Negative for bleeding problem. Does not bruise/bleed easily.   Skin: Negative for dry skin, itching and rash.   Musculoskeletal: Negative for falls. Positive for right shoulder pain  Gastrointestinal: Negative for abdominal pain and bowel incontinence.   Genitourinary: Negative for bladder incontinence and nocturia.   Neurological: Negative for disturbances in coordination, loss of balance and seizures.   Psychiatric/Behavioral: Negative for depression. The patient does not have insomnia.   "  Allergic/Immunologic: Negative for hives and persistent infections.     PHYSICAL EXAMINATION    Vitals: BP (!) 150/80 (BP Location: Right arm, Patient Position: Sitting, BP Method: Small (Manual))   Ht 5' 4" (1.626 m)   Wt 61.2 kg (135 lb)   BMI 23.17 kg/m²     General: The patient appears active and healthy with no apparent physical problems.  No disturbance of mood or affect is demonstrated. Alert and Oriented.    HEENT: Eyes normal, pupils equally round, nose normal.    Resp: Equal and symmetrical chest rises. No wheezing    CV: Regular rate    Neck: Supple; nonpainful range of motion.    Extremities: no cyanosis, clubbing, edema, or diffuse swelling.  Palpable pulses, good capillary refill of the digits.  No coolness, discoloration, edema or obvious varicosities.    Skin: no lesions noted.    Lymphatic: no detected adenopathy in the upper or lower extremities.    Neurologic: normal mental status, normal reflexes, normal gait and balance.  Patient is alert and oriented to person, place and time.  No flaccidity or spasticity is noted.  No motor or sensory deficits are noted.  Light touch is intact    Orthopaedic:     SHOULDER EXAM - RIGHT    Inspection:   Normal skin color and appearance with no scars.  No muscle atrophy noted.  No scapular winging.      Palpation: No tenderness of the acromioclavicular joint, lateral edge of the acromion, biceps tendon, trapezius muscle or scapulothoracic bursa.  Crepitus of the glenohumeral joint    ROM:      PROM:     FE - 150°    Abd/ER -  80°  Abd/IR -  30°   Add/ER -  40°     AROM:    FE - 150°  with pain beyond 90°.  Abd/ER -  80°  Abd/IR -  30°   Add/ER -  40°  *pain resisted ER       Tests:     - Bonilla, - Neer's, - Cross Arm Adduction, - Moore, - Yerguson, - Speed. - Belly Press,  + Jobes, - Lift Off    Stability: - sulcus, - apprehension, - relocation, - load and shift, - DLS      Motor:  Rotator cuff strength is 5/5 supraspinatus, 5/5 infraspinatus, 5/5 " subscapularis. Biceps, triceps and deltoid strength is 5/5.      Neuro     Distally there are no paresthesias, and sensation is intact to light touch in the median, ulnar, and radial distributions.  Reflexes are 2/2 biceps, triceps and brachioradialis.        IMAGING    CT Arthrogram Shoulder Right  Order: [id number]  Status: Final result     Visible to patient: Yes (seen)     Next appt: 07/26/2023 at 11:00 AM in Radiology ([facility])     Dx: Acute pain of right shoulder     0 Result Notes  Details    Reading Physician Reading Date Result Priority   Musa Maloney MD  [phone] 2/3/2023 Routine     Narrative & Impression  EXAMINATION:  CT ARTHROGRAM SHOULDER RIGHT     CLINICAL HISTORY:  Shoulder pain, rotator cuff disorder suspected, xray done;  Pain in right shoulder     TECHNIQUE:  Routine right multiplanar CT arthrogram shoulder performed after the intra-articular injection of contrast.     COMPARISON:  None     FINDINGS:  Rotator cuff: There is a large full-thickness rotator cuff tear involving the entire supra and infraspinatus tendons.  There is tendon retraction to the glenoid rim.  The teres minor and subscapularis are intact.  There is mild volume loss of both supra and infraspinatus muscles.  High-riding humeral head noted abutting the acromion.     Mild AC joint arthropathy.     The biceps tendon not identified, probably torn.     Generalized cartilage thinning/joint space narrowing noted with mild osteophytosis along the inferior margin of the humeral head.  No fractures or marrow replacement process.  No evidence of osteomyelitis.  No axillary lymphadenopathy.  Emphysematous changes noted in the right lung apex.     Impression:     Large full-thickness rotator cuff tear, involving the infraspinatus and supraspinatus tendons, as above.     This report was flagged in Epic as abnormal.        Electronically signed by: Musa Maloney MD  Date:                                            02/03/2023  Time:                                            13:22           Exam Ended: 02/03/23 12:33 Last Resulted: 02/03/23 13:22           X-Ray Shoulder Complete 2 View Right  Order: [id number]  Status: Final result     Visible to patient: Yes (seen)     Next appt: 07/26/2023 at 11:00 AM in Radiology ([facility] DEXA[facility])     Dx: Right shoulder pain     0 Result Notes  Details    Reading Physician Reading Date Result Priority   Myrna Hameed MD  [phone] 1/27/2023 STAT     Narrative & Impression  EXAMINATION:  XR SHOULDER COMPLETE 2 OR MORE VIEWS RIGHT     CLINICAL HISTORY:  Pain in right shoulder     TECHNIQUE:  Two views of the right shoulder were performed.     COMPARISON:  01/10/2023.     FINDINGS:  No evidence of acute displaced fracture, dislocation, or osseous destructive process.  Suspected remote healed fracture deformity is seen is seen of the right humeral head and neck.  Mild degenerative changes are seen at the glenohumeral and acromioclavicular joints.     Impression:     No acute osseous abnormality identified.        Electronically signed by: Myrna Hameed MD  Date:                                            01/27/2023  Time:                                           17:41           Exam Ended: 01/27/23 17:33 Last Resulted: 01/27/23 17:41               IMPRESSION       ICD-10-CM ICD-9-CM   1. Traumatic complete tear of right rotator cuff, initial encounter  S46.011A 840.4         MEDICATIONS PRESCRIBED      None    RECOMMENDATIONS     Surgical versus non-surgical options discussed today at length; right reverse total shoulder arthroplasty with transfer of the biceps tendon  The risks, benefits, and postoperative recovery of the procedure were discussed in depth today  She states that her medical condition has improved and believes that she would be cleared for surgery from a medical standpoint  Referral sent to shoulder joint replacement specialist  RTC with me as needed; can repeat left knee HA injections when  needed    Procedures        All questions were answered, pt will contact us for questions or concerns in the interim.

## 2023-07-24 NOTE — PROGRESS NOTES
Subjective:     Visit encounter date:   07/24/2023      Patient ID: Jessica Floyd is a 72 y.o. female.    Chief Complaint: Routine Foot Care (Nails and calloused skin/dry flaky skin)  bilateral foot pain due to calluses and concerned about developing fungus on toenails.   total hip arthroplasty on 5/28/2023    PCP:  Effie Olmedo MD    last encounter with Dr. Olmedo on : 6/28/2023      Patient Active Problem List   Diagnosis    Coronary artery disease involving native coronary artery of native heart without angina pectoris    SAH (subarachnoid hemorrhage)    Dyslipidemia    Bilateral carotid artery stenosis    Gastroesophageal reflux disease without esophagitis    Stage 3a chronic kidney disease    Hypokalemia    Hypertension    Subclinical hyperthyroidism    Allergic rhinitis    Nuclear sclerotic cataract of left eye    Traumatic complete tear of right rotator cuff    Localized primary osteoarthritis of right shoulder region [M19.011 (ICD-10-CM)]    Iron deficiency anemia secondary to blood loss (chronic)    Left displaced femoral neck fracture    Hypomagnesemia    Hypophosphatemia    Leukocytosis    Acute blood loss anemia    Malnutrition of mild degree         Current Outpatient Medications on File Prior to Visit   Medication Sig Dispense Refill    allopurinoL (ZYLOPRIM) 100 MG tablet Take 2 tablets (200 mg total) by mouth once daily. 60 tablet 11    amLODIPine (NORVASC) 5 MG tablet Take 1 tablet (5 mg total) by mouth once daily. 30 tablet 3    atorvastatin (LIPITOR) 80 MG tablet TAKE 1 TABLET BY MOUTH EVERY DAY 90 tablet 3    calcium-vitamin D3 (OS-WHITNEY 500 + D3) 500 mg-5 mcg (200 unit) per tablet Take 2 tablets by mouth once daily. 15 tablet 0    carvediloL (COREG) 6.25 MG tablet Take 1 tablet (6.25 mg total) by mouth 2 (two) times daily with meals. 60 tablet 11    fluticasone propionate (FLONASE) 50 mcg/actuation nasal spray SPRAY ONCE INTO EACH NOSTRIL ONCE DAILY 16 mL 3     hydroCHLOROthiazide (HYDRODIURIL) 25 MG tablet Take 1 tablet (25 mg total) by mouth once daily. 90 tablet 3    melatonin (MELATIN) 3 mg tablet Take 2 tablets (6 mg total) by mouth nightly as needed for Insomnia.  0    montelukast (SINGULAIR) 10 mg tablet Take 1 tablet (10 mg total) by mouth every evening. 90 tablet 11    pantoprazole (PROTONIX) 40 MG tablet TAKE 1 TABLET BY MOUTH EVERY DAY 90 tablet 0    polyethylene glycol (GLYCOLAX) 17 gram PwPk Take 17 g by mouth once daily.  0    pregabalin (LYRICA) 75 MG capsule Take 1 capsule (75 mg total) by mouth every evening. 30 capsule 6    senna-docusate 8.6-50 mg (PERICOLACE) 8.6-50 mg per tablet Take 1 tablet by mouth 2 (two) times daily. 60 tablet 3    vitamin D (VITAMIN D3) 50 mcg (2,000 unit) Tab Take 1 tablet (2,000 Units total) by mouth once daily.      DEPO-MEDROL 40 mg/mL injection       VENOFER 100 mg iron/5 mL injection        No current facility-administered medications on file prior to visit.         Review of patient's allergies indicates:  No Known Allergies      Review of Systems   Constitutional: Negative for chills, diaphoresis, fever, malaise/fatigue and night sweats.   Cardiovascular:  Negative for claudication, cyanosis, leg swelling and syncope.   Skin:  Negative for color change, dry skin, nail changes, rash, suspicious lesions and unusual hair distribution.   Musculoskeletal:  Negative for falls, gout, joint pain, joint swelling, muscle cramps, muscle weakness and stiffness.   Gastrointestinal:  Negative for constipation, diarrhea, nausea and vomiting.   Neurological:  Negative for brief paralysis, disturbances in coordination, focal weakness, numbness, paresthesias, sensory change and tremors.         Objective:      Physical Exam  Constitutional:       General: She is not in acute distress.     Appearance: She is well-developed. She is not diaphoretic.   Cardiovascular:      Pulses:           Popliteal pulses are 2+ on the right side  and 2+ on the left side.        Dorsalis pedis pulses are 2+ on the right side and 2+ on the left side.        Posterior tibial pulses are 2+ on the right side and 2+ on the left side.      Comments: Capillary refill 3 seconds all toes/distal feet, all toes/both feet warm to touch.      Negative lymphadenopathy bilateral popliteal fossa and tarsal tunnel.      Negavie lower extremity edema bilateral.    Musculoskeletal:      Right ankle: No swelling, deformity, ecchymosis or lacerations. Normal range of motion. Normal pulse.      Right Achilles Tendon: Normal. No defects. Lopez's test negative.      Comments: right bunion with limited range of motion and tenderness to palpation.  Approximately 30-40 degrees loaded.      Compound digital deformities right and left right worse that are longstanding baseline for this patient post trauma and surgical repair many years ago.   Lymphadenopathy:      Lower Body: No right inguinal adenopathy. No left inguinal adenopathy.      Comments: Negative lymphadenopathy bilateral popliteal fossa and tarsal tunnel.    Negative lymphangitic streaking bilateral feet/ankles/legs.   Skin:     General: Skin is warm and dry.      Capillary Refill: Capillary refill takes 2 to 3 seconds.      Coloration: Skin is not pale.      Findings: No abrasion, bruising, burn, ecchymosis, erythema, laceration, lesion or rash.      Nails: There is no clubbing.      Comments:   Skin is normal age and health appropriate color, turgor, texture, and temperature bilateral lower extremities without ulceration, hyperpigmentation, discoloration, masses nodules or cords palpated.  No ecchymosis, erythema, edema, or cardinal signs of infection bilateral lower extremities.     Neurological:      Mental Status: She is alert and oriented to person, place, and time.      Sensory: No sensory deficit.      Motor: No tremor, atrophy or abnormal muscle tone.      Gait: Gait normal.      Comments: Negative tinel sign to  percussion sural, superficial peroneal, deep peroneal, saphenous, and posterior tibial nerves right and left ankles and feet.    Negative allodynia both feet             Assessment:       Encounter Diagnoses   Name Primary?    Stage 3a chronic kidney disease Yes    Bilateral foot pain     Corn or callus     Dermatophytosis of nail              Plan:       Jessica was seen today for routine foot care.    Diagnoses and all orders for this visit:    Stage 3a chronic kidney disease    Bilateral foot pain    Corn or callus    Dermatophytosis of nail    Other orders  -     clotrimazole (LOTRIMIN) 1 % Soln; Apply topically once daily. To affected toenails.          I counseled the patient on her conditions, their implications and medical management.  Foot exam and care.   Meds as ordered.   General nail care measures for abnormal nails include:  Keeping nails trimmed short, but avoid overzealous trimming  Avoiding trauma   Avoiding contact irritants   Keeping nails dry (avoiding wet work)  Avoiding all nail cosmetics  Wearing shoes that fit well at the toe box.  Avoid tight shoes.     Routine Foot Care    Date/Time: 7/24/2023 9:15 AM  Performed by: Marilin Trevino DPM  Authorized by: Marilin Trevino DPM     Consent Done?:  Yes (Verbal)  Hyperkeratotic Skin Lesions?: Yes    Number of trimmed lesions:  4  Location(s):  Left 5th Metatarsal Head, Right 5th Metatarsal Head, Right 3rd Metatarsal Head and Right 2nd Metatarsal Head    Nail Care Type:  Debride  Location(s): All  (Left 1st Toe, Left 3rd Toe, Left 2nd Toe, Left 4th Toe, Left 5th Toe, Right 1st Toe, Right 2nd Toe, Right 3rd Toe, Right 4th Toe and Right 5th Toe)  Patient tolerance:  Patient tolerated the procedure well with no immediate complications     With patient's permission, the toenails mentioned above were reduced and debrided using a nail nipper, removing offending nail and debris.   Utilizing a #15 scalpel, I trimmed the corns and calluses at the above  mentioned location.      The patient will continue to monitor the areas daily, inspect the feet, wear protective shoe gear when ambulatory, and moisturizer to maintain skin integrity.

## 2023-07-26 ENCOUNTER — HOSPITAL ENCOUNTER (OUTPATIENT)
Dept: RADIOLOGY | Facility: OTHER | Age: 73
Discharge: HOME | End: 2023-07-26
Attending: PHYSICIAN ASSISTANT
Payer: MEDICARE

## 2023-07-26 DIAGNOSIS — M80.08XA AGE-RELATED OSTEOPOROSIS WITH CURRENT PATHOLOGICAL FRACTURE, VERTEBRA(E), INITIAL ENCOUNTER FOR FRACTURE: ICD-10-CM

## 2023-07-26 DIAGNOSIS — M80.80XA PATHOLOGICAL FRACTURE DUE TO OSTEOPOROSIS, UNSPECIFIED FRACTURE SITE, UNSPECIFIED OSTEOPOROSIS TYPE, INITIAL ENCOUNTER: ICD-10-CM

## 2023-07-26 PROCEDURE — 77080 DXA BONE DENSITY AXIAL: CPT | Mod: TC,HCNC

## 2023-07-26 PROCEDURE — 77080 DXA BONE DENSITY AXIAL: CPT | Mod: 26,HCNC,, | Performed by: RADIOLOGY

## 2023-07-26 PROCEDURE — 77080 DXA BONE DENSITY AXIAL SKELETON 1 OR MORE SITES: ICD-10-PCS | Mod: 26,HCNC,, | Performed by: RADIOLOGY

## 2023-08-02 ENCOUNTER — PATIENT MESSAGE (OUTPATIENT)
Dept: SPORTS MEDICINE | Facility: CLINIC | Age: 73
End: 2023-08-02
Payer: MEDICARE

## 2023-08-02 ENCOUNTER — OFFICE VISIT (OUTPATIENT)
Dept: ORTHOPEDICS | Facility: CLINIC | Age: 73
End: 2023-08-02
Payer: MEDICARE

## 2023-08-02 ENCOUNTER — CLINICAL SUPPORT (OUTPATIENT)
Dept: REHABILITATION | Facility: OTHER | Age: 73
End: 2023-08-02
Payer: MEDICARE

## 2023-08-02 VITALS
DIASTOLIC BLOOD PRESSURE: 84 MMHG | HEART RATE: 83 BPM | SYSTOLIC BLOOD PRESSURE: 157 MMHG | WEIGHT: 135 LBS | BODY MASS INDEX: 23.05 KG/M2 | HEIGHT: 64 IN

## 2023-08-02 DIAGNOSIS — S46.011D TRAUMATIC COMPLETE TEAR OF RIGHT ROTATOR CUFF, SUBSEQUENT ENCOUNTER: ICD-10-CM

## 2023-08-02 DIAGNOSIS — M80.80XA PATHOLOGICAL FRACTURE DUE TO OSTEOPOROSIS, UNSPECIFIED FRACTURE SITE, UNSPECIFIED OSTEOPOROSIS TYPE, INITIAL ENCOUNTER: Primary | ICD-10-CM

## 2023-08-02 DIAGNOSIS — M81.0 OSTEOPOROSIS, UNSPECIFIED OSTEOPOROSIS TYPE, UNSPECIFIED PATHOLOGICAL FRACTURE PRESENCE: ICD-10-CM

## 2023-08-02 DIAGNOSIS — M81.6 LOCALIZED OSTEOPOROSIS OF LEQUESNE: ICD-10-CM

## 2023-08-02 DIAGNOSIS — Z74.09 IMPAIRED FUNCTIONAL MOBILITY, BALANCE, GAIT, AND ENDURANCE: ICD-10-CM

## 2023-08-02 DIAGNOSIS — M25.60 DECREASED RANGE OF MOTION WITH DECREASED STRENGTH: ICD-10-CM

## 2023-08-02 DIAGNOSIS — S72.002A LEFT DISPLACED FEMORAL NECK FRACTURE: ICD-10-CM

## 2023-08-02 DIAGNOSIS — M19.011 LOCALIZED PRIMARY OSTEOARTHRITIS OF RIGHT SHOULDER REGION: ICD-10-CM

## 2023-08-02 DIAGNOSIS — R53.1 DECREASED RANGE OF MOTION WITH DECREASED STRENGTH: ICD-10-CM

## 2023-08-02 PROBLEM — S46.011A TRAUMATIC COMPLETE TEAR OF RIGHT ROTATOR CUFF: Status: RESOLVED | Noted: 2023-02-15 | Resolved: 2023-08-02

## 2023-08-02 PROCEDURE — 3288F PR FALLS RISK ASSESSMENT DOCUMENTED: ICD-10-PCS | Mod: HCNC,CPTII,S$GLB, | Performed by: PHYSICIAN ASSISTANT

## 2023-08-02 PROCEDURE — 3077F SYST BP >= 140 MM HG: CPT | Mod: HCNC,CPTII,S$GLB, | Performed by: PHYSICIAN ASSISTANT

## 2023-08-02 PROCEDURE — 97110 THERAPEUTIC EXERCISES: CPT | Mod: PN

## 2023-08-02 PROCEDURE — 1160F RVW MEDS BY RX/DR IN RCRD: CPT | Mod: HCNC,CPTII,S$GLB, | Performed by: PHYSICIAN ASSISTANT

## 2023-08-02 PROCEDURE — 99214 PR OFFICE/OUTPT VISIT, EST, LEVL IV, 30-39 MIN: ICD-10-PCS | Mod: HCNC,24,S$GLB, | Performed by: PHYSICIAN ASSISTANT

## 2023-08-02 PROCEDURE — 97163 PT EVAL HIGH COMPLEX 45 MIN: CPT | Mod: PN

## 2023-08-02 PROCEDURE — 1159F MED LIST DOCD IN RCRD: CPT | Mod: HCNC,CPTII,S$GLB, | Performed by: PHYSICIAN ASSISTANT

## 2023-08-02 PROCEDURE — 3008F BODY MASS INDEX DOCD: CPT | Mod: HCNC,CPTII,S$GLB, | Performed by: PHYSICIAN ASSISTANT

## 2023-08-02 PROCEDURE — 1159F PR MEDICATION LIST DOCUMENTED IN MEDICAL RECORD: ICD-10-PCS | Mod: HCNC,CPTII,S$GLB, | Performed by: PHYSICIAN ASSISTANT

## 2023-08-02 PROCEDURE — 1101F PR PT FALLS ASSESS DOC 0-1 FALLS W/OUT INJ PAST YR: ICD-10-PCS | Mod: HCNC,CPTII,S$GLB, | Performed by: PHYSICIAN ASSISTANT

## 2023-08-02 PROCEDURE — 3044F PR MOST RECENT HEMOGLOBIN A1C LEVEL <7.0%: ICD-10-PCS | Mod: HCNC,CPTII,S$GLB, | Performed by: PHYSICIAN ASSISTANT

## 2023-08-02 PROCEDURE — 3079F DIAST BP 80-89 MM HG: CPT | Mod: HCNC,CPTII,S$GLB, | Performed by: PHYSICIAN ASSISTANT

## 2023-08-02 PROCEDURE — 3288F FALL RISK ASSESSMENT DOCD: CPT | Mod: HCNC,CPTII,S$GLB, | Performed by: PHYSICIAN ASSISTANT

## 2023-08-02 PROCEDURE — 3044F HG A1C LEVEL LT 7.0%: CPT | Mod: HCNC,CPTII,S$GLB, | Performed by: PHYSICIAN ASSISTANT

## 2023-08-02 PROCEDURE — 99214 OFFICE O/P EST MOD 30 MIN: CPT | Mod: HCNC,24,S$GLB, | Performed by: PHYSICIAN ASSISTANT

## 2023-08-02 PROCEDURE — 1125F AMNT PAIN NOTED PAIN PRSNT: CPT | Mod: HCNC,CPTII,S$GLB, | Performed by: PHYSICIAN ASSISTANT

## 2023-08-02 PROCEDURE — 1125F PR PAIN SEVERITY QUANTIFIED, PAIN PRESENT: ICD-10-PCS | Mod: HCNC,CPTII,S$GLB, | Performed by: PHYSICIAN ASSISTANT

## 2023-08-02 PROCEDURE — 99999 PR PBB SHADOW E&M-EST. PATIENT-LVL III: ICD-10-PCS | Mod: PBBFAC,HCNC,, | Performed by: PHYSICIAN ASSISTANT

## 2023-08-02 PROCEDURE — 97530 THERAPEUTIC ACTIVITIES: CPT | Mod: PN

## 2023-08-02 PROCEDURE — 3079F PR MOST RECENT DIASTOLIC BLOOD PRESSURE 80-89 MM HG: ICD-10-PCS | Mod: HCNC,CPTII,S$GLB, | Performed by: PHYSICIAN ASSISTANT

## 2023-08-02 PROCEDURE — 1160F PR REVIEW ALL MEDS BY PRESCRIBER/CLIN PHARMACIST DOCUMENTED: ICD-10-PCS | Mod: HCNC,CPTII,S$GLB, | Performed by: PHYSICIAN ASSISTANT

## 2023-08-02 PROCEDURE — 3077F PR MOST RECENT SYSTOLIC BLOOD PRESSURE >= 140 MM HG: ICD-10-PCS | Mod: HCNC,CPTII,S$GLB, | Performed by: PHYSICIAN ASSISTANT

## 2023-08-02 PROCEDURE — 1101F PT FALLS ASSESS-DOCD LE1/YR: CPT | Mod: HCNC,CPTII,S$GLB, | Performed by: PHYSICIAN ASSISTANT

## 2023-08-02 PROCEDURE — 99999 PR PBB SHADOW E&M-EST. PATIENT-LVL III: CPT | Mod: PBBFAC,HCNC,, | Performed by: PHYSICIAN ASSISTANT

## 2023-08-02 PROCEDURE — 3008F PR BODY MASS INDEX (BMI) DOCUMENTED: ICD-10-PCS | Mod: HCNC,CPTII,S$GLB, | Performed by: PHYSICIAN ASSISTANT

## 2023-08-02 RX ORDER — SODIUM CHLORIDE 0.9 % (FLUSH) 0.9 %
10 SYRINGE (ML) INJECTION
OUTPATIENT
Start: 2023-08-02

## 2023-08-02 RX ORDER — ZOLEDRONIC ACID 5 MG/100ML
5 INJECTION, SOLUTION INTRAVENOUS
OUTPATIENT
Start: 2023-08-02

## 2023-08-02 NOTE — PROGRESS NOTES
Chief Complaint & History of Present Illness:  Jessica Floyd is a established patient 73 y.o. female who is seen here today for review of lab results, DEXA scan results, and determine a treatment plan for her osteoporosis.  she has reviewed the information provided at her initial visit.  After review of treatment options together we has elected to proceed with Reclast as her treatment option today for her osteoporosis and to reduce risk of future fractures.        Review of patient's allergies indicates:  No Known Allergies      Current Outpatient Medications   Medication Sig Dispense Refill    allopurinoL (ZYLOPRIM) 100 MG tablet Take 2 tablets (200 mg total) by mouth once daily. 60 tablet 11    amLODIPine (NORVASC) 5 MG tablet Take 1 tablet (5 mg total) by mouth once daily. 30 tablet 3    atorvastatin (LIPITOR) 80 MG tablet TAKE 1 TABLET BY MOUTH EVERY DAY 90 tablet 3    calcium-vitamin D3 (OS-WHITNEY 500 + D3) 500 mg-5 mcg (200 unit) per tablet Take 2 tablets by mouth once daily. 15 tablet 0    carvediloL (COREG) 6.25 MG tablet Take 1 tablet (6.25 mg total) by mouth 2 (two) times daily with meals. 60 tablet 11    clotrimazole (LOTRIMIN) 1 % Soln Apply topically once daily. To affected toenails. 30 mL 6    DEPO-MEDROL 40 mg/mL injection       fluticasone propionate (FLONASE) 50 mcg/actuation nasal spray SPRAY ONCE INTO EACH NOSTRIL ONCE DAILY 16 mL 3    hydroCHLOROthiazide (HYDRODIURIL) 25 MG tablet Take 1 tablet (25 mg total) by mouth once daily. 90 tablet 3    melatonin (MELATIN) 3 mg tablet Take 2 tablets (6 mg total) by mouth nightly as needed for Insomnia.  0    montelukast (SINGULAIR) 10 mg tablet Take 1 tablet (10 mg total) by mouth every evening. 90 tablet 11    pantoprazole (PROTONIX) 40 MG tablet TAKE 1 TABLET BY MOUTH EVERY DAY 90 tablet 0    polyethylene glycol (GLYCOLAX) 17 gram PwPk Take 17 g by mouth once daily.  0    pregabalin (LYRICA) 75 MG capsule Take 1 capsule (75 mg total) by mouth every evening.  30 capsule 6    vitamin D (VITAMIN D3) 50 mcg (2,000 unit) Tab Take 1 tablet (2,000 Units total) by mouth once daily.      senna-docusate 8.6-50 mg (PERICOLACE) 8.6-50 mg per tablet Take 1 tablet by mouth 2 (two) times daily. 60 tablet 3    VENOFER 100 mg iron/5 mL injection        No current facility-administered medications for this visit.       Past Medical History:   Diagnosis Date    Allergic rhinitis     Amblyopia     Carotid stenosis     Coronary atherosclerosis     Outside [facility] 2014: 20% mLAD, 50% mCx, 20%, mRCA    Dyslipidemia     ESBL (extended spectrum beta-lactamase) producing bacteria infection     UTI    Hypertension     Nausea and vomiting 2017    Subarachnoid hemorrhage due to ruptured aneurysm        Past Surgical History:   Procedure Laterality Date    ADENOIDECTOMY      ANTERIOR CRUCIATE LIGAMENT REPAIR      APPENDECTOMY      CATARACT EXTRACTION W/  INTRAOCULAR LENS IMPLANT Right 2022    Procedure: EXTRACTION, CATARACT, WITH IOL INSERTION;  Surgeon: Higinio Cristina MD;  Location: [facility];  Service: Ophthalmology;  Laterality: Right;    CATARACT EXTRACTION W/  INTRAOCULAR LENS IMPLANT Left 2022    Procedure: EXTRACTION, CATARACT, WITH IOL INSERTION;  Surgeon: Higinio Cristina MD;  Location: [facility];  Service: Ophthalmology;  Laterality: Left;    CEREBRAL ANEURYSM REPAIR      clip     SECTION      DENTAL SURGERY      EYE SURGERY Bilateral     cataract removal and lens implants    HIP ARTHROPLASTY Left 2023    Procedure: TOTAL HIP ARTHROPLASTY;  Surgeon: Juan Wan MD;  Location: [address];  Service: Orthopedics;  Laterality: Left;    TONSILLECTOMY         Lab Results   Component Value Date     2023    K 3.7 2023     2023    CO2 26 2023    GLU 84 2023    BUN 29 (H) 2023    CREATININE 0.8 2023    CALCIUM 9.8 2023    PROT 6.6 2023    ALBUMIN 3.3 (L) 2023    BILITOT 0.5  "06/12/2023    ALKPHOS 73 06/12/2023    ALKPHOS 73 06/12/2023    AST 28 06/12/2023    ALT 20 06/12/2023    ANIONGAP 9 06/12/2023    ESTGFRAFRICA >60.0 07/13/2022    EGFRNONAA 56.4 (A) 07/13/2022      Lab Results   Component Value Date    WBC 7.78 06/12/2023    RBC 2.82 (L) 06/12/2023    HGB 8.8 (L) 06/12/2023    HCT 28.4 (L) 06/12/2023     (H) 06/12/2023    MCH 31.2 (H) 06/12/2023    MCHC 31.0 (L) 06/12/2023    RDW 23.0 (H) 06/12/2023     06/12/2023    MPV 11.3 06/12/2023    GRAN 4.0 06/12/2023    GRAN 51.3 06/12/2023    LYMPH 1.7 06/12/2023    LYMPH 21.3 06/12/2023    MONO 1.6 (H) 06/12/2023    MONO 20.3 (H) 06/12/2023    EOS 0.4 06/12/2023    BASO 0.12 06/12/2023    EOSINOPHIL 4.8 06/12/2023    BASOPHIL 1.5 06/12/2023    DIFFMETHOD Automated 06/12/2023      Lab Results   Component Value Date    MG 1.7 06/12/2023      Lab Results   Component Value Date    PHOS 3.9 06/12/2023      Lab Results   Component Value Date    UVIJLYCA46XP 42 06/12/2023      Lab Results   Component Value Date    PTH 52.1 06/12/2023      Lab Results   Component Value Date    TSH 1.140 06/12/2023      Lab Results   Component Value Date    FREET4 0.93 06/12/2023      Lab Results   Component Value Date    HGBA1C 4.9 05/26/2023    ESTIMATEDAVG 94 05/26/2023      No results found for: "FREETESTOSTE"     DEXA Scan was reviewed and demonstrates :     T-Score Hip: -1.5     T-Score Spine: 2.0      FRAX:      Major Fx.: 17.2%         Hip Fx.: 3.1%                                    Vital Signs (Most Recent)  Vitals:    08/02/23 0847   BP: (!) 157/84   Pulse: 83         Review of Systems:  ROS:  Constitutional: no fever or chills  Eyes: no visual changes, amblyopia  ENT: no nasal congestion or sore throat  Respiratory: no respiratory symptoms  Cardiovascular: no chest pain or palpitations, hypertension, coronary atherosclerosis coronary artery disease  Gastrointestinal: no nausea or vomiting, tolerating diet, positive for " GERD  Genitourinary: no hematuria or dysuria, CKD 3 stage IIIA hypophosphatemia hypo magnesium hypokalemia  Integument/Breast: no rash or pruritis  Hematologic/Lymphatic: no easy bruising or lymphadenopathy, leukocytosis, iron deficiency anemia  Musculoskeletal: no arthralgias or myalgias, positive right rotator cuff tear, pathological left femur fracture  Neurological: no seizures or tremors, positive subarachnoid hemorrhage  Behavioral/Psych: no auditory or visual hallucinations  Endocrine: no heat or cold intolerance, positive mild malnutrition,     she will continue with her calcium and vitamin D.  Fall prevention and personal safety have been reviewed.    We have discussed the need for core strengthening and balance exercises for fall prevention, we may consider formal physical therapy at her next visit.    Discussed the risk of osteonecrosis of the jaw with bisphosphonates with incidence estimated from 1-10/[id number] treated patients. Discussed that the incidence of ONJ is higher in patients undergoing invasive dental surgery (excludes routine cleaning, fillings or root canals). Dental work should ideally be completed prior to initiation of treatment; I told her to let her dentist know at the upcoming appointment that we are planning on treating with alendronate to prevent fractures. Preventative measures such as good oral hygiene encouraged.     Discussed the risk of atypical femur fractures with bisphosphonates and denosumab. The exact incidence is unknown, but has been estimated at approximately 1 in 07471 patients treated with oral bisphosphonates and increasing incidence was correlated with increased duration of bisphosphonate use. Despite this risk, the significant benefit on fracture reduction far outweighs the potential for this rare event.         Assessment and plan:      ICD-10-CM ICD-9-CM   1. Pathological fracture due to osteoporosis, unspecified fracture site, unspecified osteoporosis type, initial  encounter  M80.80XA 733.10     733.00   2. Osteoporosis, unspecified osteoporosis type, unspecified pathological fracture presence  M81.0 733.00   3. Localized osteoporosis of [place]  M81.6 733.09   4. Traumatic complete tear of right rotator cuff, subsequent encounter  S46.011D V58.89     840.4   5. Localized primary osteoarthritis of right shoulder region [M19.011 (ICD-10-CM)]  M19.011 715.11       Zoledronic Acid IV yearly    Follow up 1-2 weeks after initiation of treatment to check Calcium, Vitamin D levels and access tolerance to medication.      Addendum to note:  Patient has a long history of longstanding severe right shoulder pain secondary to traumatic complete rotator cuff tear has had extensive nonsurgical treatments to include injection therapies and physical therapy but continues to digress.  She had been initially evaluated to undergo shoulder replacement at sports medicine 05/01/2023.  There was some concerns at that time about moving forward with surgery secondary to her iron deficiency anemia which she has now gotten under control.  She would like to move forward with discussions on shoulder replacement surgery, she would like to go to a surgeon at [facility] as opposed to [facility].

## 2023-08-04 ENCOUNTER — PATIENT MESSAGE (OUTPATIENT)
Dept: ORTHOPEDICS | Facility: CLINIC | Age: 73
End: 2023-08-04
Payer: MEDICARE

## 2023-08-07 PROBLEM — M25.60 DECREASED RANGE OF MOTION WITH DECREASED STRENGTH: Status: ACTIVE | Noted: 2023-08-07

## 2023-08-07 PROBLEM — R53.1 DECREASED RANGE OF MOTION WITH DECREASED STRENGTH: Status: ACTIVE | Noted: 2023-08-07

## 2023-08-07 PROBLEM — Z74.09 IMPAIRED FUNCTIONAL MOBILITY, BALANCE, GAIT, AND ENDURANCE: Status: ACTIVE | Noted: 2023-08-07

## 2023-08-07 NOTE — PLAN OF CARE
"[facility]   Physical Therapy Initial Evaluation      Name: Jessica Floyd  Clinic Number: [id number]    Therapy Diagnosis:   Encounter Diagnoses   Name Primary?    Left displaced femoral neck fracture     Impaired functional mobility, balance, gait, and endurance     Decreased range of motion with decreased strength         Physician: Mckay Cosby, NP    Physician Orders: PT Eval and Treat -- Weight bear as tolerated and ROMAT - caution with posterior hip activities, Work on core strength  Medical Diagnosis from Referral: S72.002A (ICD-10-CM) - Left displaced femoral neck fracture   Evaluation Date: 8/2/2023  Authorization Period Expiration: 10/01/2023  Plan of Care Expiration: 11/2/2023  Progress Note Due: 9/2/2023  Visit # / Visits authorized: 1/ 1   FOTO: 1/ 3    Precautions: Standard, 1997 brain aneurysm burst impacting L field of vision and temporary paralysis on L side       PROCEDURE:  by Juan Wan MD            Left total hip arthroplasty for femoral neck fracture --- posterior approach to hip     Post-op precautions: Weight-bearing as tolerated Left lower extremity. Posterior hip precautions Left lower extremity x6 weeks          Time In: 1:00pm  Time Out: 1:45pm  Total Billable Time: 45 minutes    Subjective       Date of onset: DOS: 5/31/2023    History of current condition - Jessica reports: she had a left IDA due to a femoral neck fx following a fall. Surgery was in May 2023. Overall feeling better, but has difficulty walking or standing without the cane. "I feel very off-balance, and it is difficult to use my cane in my R hand due to having a complete RTC tear on my shoulder. They think I need a replacement."    Continues to take care of 4 y/o granddaughter. Enjoys gardening - sits on a stool, rollaider walker to sit as needed. Is able to climb stairs at home but must have onto HR. C/c is balance deficits.      Falls: 5/28/23 - feel when a table falling hit " her and caused her to fall to the ground, breaking her left hip    Imaging: bone scan films, hip, 2023: Prominent degenerative disc spondylosis facet joint arthropathy lumbosacral junction, postop left total hip joint replacement, no hardware failure.  Calcific bursitis changes greater trochanters.  Osteopenia, DJD with some erosive change symphysis pubis.  Minimal mild DJD included right hip.    Prior Therapy: yes, for balance in  at [facility]  Social History: lives with , 5 stairs and hand rail to enter home   Occupation: retired   Prior Level of Function: mild limitation, caring for 4 year old grand child, walking often without cane  Current Level of Function: mod I with SPC, decreased ability to do functional tasks for long periods of time    Pain:  Current 3/10, worst 6/10, best 3/10   Location: left hip  Description: Aching and Dull, tight  Aggravating Factors: Sitting, Standing, Bending, Walking, Lifting, Getting out of bed/chair, and stair climbing, getting low without squatting deep for gardening  Easing Factors: rest and movement (change positions)    Patients goals: Going to [facility] in the fall (Sept-Oct 2023) with  -- wants to be able to walk around with safe balance and possibly without a cane     Medical History:   Past Medical History:   Diagnosis Date    Allergic rhinitis     Amblyopia     Carotid stenosis     Coronary atherosclerosis     Outside [facility] 2014: 20% mLAD, 50% mCx, 20%, mRCA    Dyslipidemia     ESBL (extended spectrum beta-lactamase) producing bacteria infection     UTI    Hypertension     Nausea and vomiting 2017    Subarachnoid hemorrhage due to ruptured aneurysm        Surgical History:   Jessica Floyd  has a past surgical history that includes Anterior cruciate ligament repair (); Dental surgery; Cerebral aneurysm repair (); Tonsillectomy; Adenoidectomy; Appendectomy;  section; Cataract extraction w/  intraocular lens implant (Right,  "2022); Cataract extraction w/  intraocular lens implant (Left, 2022); Eye surgery (Bilateral); and Hip Arthroplasty (Left, 2023).    Medications:   Jessica has a current medication list which includes the following prescription(s): allopurinol, amlodipine, atorvastatin, calcium-vitamin d3, carvedilol, clotrimazole, depo-medrol, fluticasone propionate, hydrochlorothiazide, melatonin, montelukast, pantoprazole, polyethylene glycol, pregabalin, senna-docusate 8.6-50 mg, venofer, and cholecalciferol (vitamin d3).    Allergies:   Review of patient's allergies indicates:  No Known Allergies     Objective      WNL=within normal limits  WFL=within functional limits  NT=not tested  !=pain     Posture: slight trunk lean to R, flexed posture  Palpation: TTP glutes, TFL, ITB, proximal hip flexors    Lumbar AROM Pain/Dysfunction with Movement:   Flexion WFL Good spine movement, decreased hip flex >90 per MD protocol   Right rotation WFL No pain   Left rotation WFL No pain       Strength:  Hip Left  Right    Flexion 4-/5 4/5   Abduction 3+/5 3+   Adduction 4- 4-/5   Extension 3-  Difficulty clearing mat  4- with hip substitution movements       Knee Left  Right   Extension 4/5 4-/5      Ankle Left  Right    Dorsiflexion 4/5 4/5   Plantarflexion 13 heel raises with SC  13 heel raises with SC        Knee Left  Right    Extension 0 0   Flexion 130 140      Ankle Left  Right    Dorsiflexion 7 6   Plantarflexion 51 40     Flexibility: HS = poor (<70 deg sera), ely's (quads) = poor sera    TU seconds with RW    30 second sit-to-stand test (without UE support): 0 without UE support; 5 with UE support    Gait Analysis: Mod I with SPC, step through, bradykinetic with WBOS    Gross movement:   T/f sit<>stand: mod I with HHAx1, following hip precautions    Balance:   SLS = UA without LOB  Staggered stance = UA >10" w/o LOB  NBOS = 1-" with LOB    Impairment/Limitation/Restriction for FOTO Hip Survey    Therapist reviewed " "FOTO scores for Jessica Floyd on 8/2/2023.   FOTO documents entered into EPIC - see Media section.         Treatment     Total Treatment time (time-based codes) separate from Evaluation: 30 minutes     Jessica received the treatments listed below:      therapeutic exercises to develop strength, endurance, ROM, flexibility, posture, and core stabilization for 18 minutes including:    Reviewed for HEP today, progress reps next visit:  Quad sets: 5"x10   Glute sets: 5"x10  Straight leg raise: x 10  hip ADD 10 x 5" -   bridge with ball squeeze x 10   HL 3-way clam x 10 x YTB   Sidelying hip abduction: attempted but UA 2* pain in R shoulder (RTC tear)  Long arc quads: 10 x 5" holds    manual therapy techniques: Joint mobilizations and Manual traction were applied to the: L hip for 5 minutes, including:  Gentle PROM into flex  Gentle inf glide    neuromuscular re-education activities to improve: Balance, Coordination, Kinesthetic, Sense, Proprioception, and Posture for 5 minutes. The following activities were included:  NBOS 2 x 30" EO  SLS 3 x 15" EO    therapeutic activities to improve functional performance for 8 minutes, including:  Sit<>stands: 10 with B UE support    gait training to improve functional mobility and safety for 00  minutes, including:  None today    Patient Education and Home Exercises     Education provided:   - Patient educated regarding surgical procedure, pathogenesis, diagnosis, protocol, prognosis, POC, and HEP, including use of visual assistance for understanding of anatomy and dysfunction. Written Home Exercises Provided with written and verbal instructions for frequency and duration of the following exercises: see list above. Pt educated on HEP and activity modifications to reduce c/o pain and improve overall function.   - Pt was educated in posture and body mechanics.  Use of a lumbar roll was recommended and demonstrated here today.  Purchase information provided.   - Pt also educated on use " of modalities prn to reduce c/o pain and dysfunction.   - Pt educated on clinic's cancellation/no-show policy of missing 3 consecutive PT appointments, which will result in an automatic discharge from therapy services 2* to non-compliance, unless otherwise stated.         Written Home Exercises Provided: yes. Exercises were reviewed and Jessica was able to demonstrate them prior to the end of the session.  Jessica demonstrated good  understanding of the education provided. See EMR under Patient Instructions for exercises provided during therapy sessions.    Assessment     Jessica is a 73 y.o. female referred to outpatient Physical Therapy with a medical diagnosis of S72.002A (ICD-10-CM) - Left displaced femoral neck fracture, with s/p IDA (posterior approach) performed on 5/31/2023 by Dr. Wan. Patient presents with marked limitations in ROM, joint and myofascial mobility, flexibility, strength, postural awareness/endurance, motor control and coordination. S/s associated with referring diagnosis with post-op status. Impairments limit pt with all functional activities including standing and walking independently, stair climbing and squatting with HHCs or self care.      Patient prognosis is Good.   Patient will benefit from skilled outpatient Physical Therapy to address the deficits stated above and in the chart below, provide patient /family education, and to maximize patientt's level of independence.     Plan of care discussed with patient: Yes  Patient's spiritual, cultural and educational needs considered and patient is agreeable to the plan of care and goals as stated below:     Anticipated Barriers for therapy: standard, fall risk, transportation, co morbidities (includes Hx large full thickness rotator cuff tear)      Medical Necessity is demonstrated by the following  History  Co-morbidities and personal factors that may impact the plan of care Co-morbidities:   Allergic rhinitis   Carotid stenosis   R CCA  stent, L CCA 60%   Coronary atherosclerosis   Outside [facility] 6/2014: 20% mLAD, 50% mCx, 20%, mRCA   Dyslipidemia   ESBL (extended spectrum beta-lactamase) producing bacteria infection   UTI   Hypertension   Nausea and vomiting   Subarachnoid hemorrhage due to ruptured aneurysm      Personal Factors:   Transportation        high   Examination  Body Structures and Functions, activity limitations and participation restrictions that may impact the plan of care Body Regions:   head  neck  back  lower extremities  upper extremities  trunk     Body Systems:    ROM  strength  gross coordinated movement  balance  gait  transfers  transitions  motor control  motor learning     Participation Restrictions:   Limitation in negotiating stairs and curbs for household and community mobility tasks, participating in yoga, getting up from the ground, interacting with and caring for grandchild, walking dogs     Activity limitations:   Learning and applying knowledge  no deficits     General Tasks and Commands  Multi tasking      Communication  no deficits     Mobility  lifting and carrying objects  fine hand use (grasping/picking up)  walking  driving (bike, car, motorcycle)     Self care  washing oneself (bathing, drying, washing hands)  dressing  looking after one's health     Domestic Life  shopping  cooking  doing house work (cleaning house, washing dishes, laundry)  assisting others     Interactions/Relationships  basic interpersonal interactions  complex interpersonal interactions  relating with strangers  formal relationships  family relationships     Life Areas  basic economic transactions     Community and Social Life  community life  recreation and leisure             high   Clinical Presentation unstable clinical presentation with unpredictable characteristics high   Decision Making/ Complexity Score: high     Goals:  Short Term Goals (6 Weeks):  1. Pt able to stand >=30 minutes with ADLs with <3/10 pain.  2. Pt able to walk  ">=30 minutes with household chores with <3/10 pain.  3. Pt able to ascend/descend curb, independently, 1 handrail, with <3/10 pain.  4. Pt able to transfer in/out of chair 8x in 30 sec with 1 HHA to demonstrate improving functional strength  5. Pt to improve TUG test to >/=25" to demonstrate improving motor control/coordination with transfers.   6. Pt to improve static balance with OLVERA balance test >/=46/56 for stability with ADLs.    Long Term Goals (12 Weeks):  1. Pt able to stand >=45 minutes with ADLs with <3/10 pain.  2. Pt able to walk >=45 minutes with household chores with <3/10 pain.  3. Pt able to ascend/descend 1 set of stairs, independently, 1 handrail, with <3/10 pain.  4. Pt able to transfer in/out of chair 10x in 30 sec with 1 HHA to demonstrate improving functional strength  5. Pt to improve TUG test to >/=15" to demonstrate improving motor control/coordination with transfers.   6. Pt to improve static balance with OLVERA balance test >/=46/56 for stability with ADLs.  7. Pt to demonstrate improved dynamic balance with DGI >/=19/24 for safety with ambulation.      TUG Cutoff Scores:        30" sit to stand Cutoff Scores:            Plan     Plan of care Certification: 8/2/2023 to 11/2/2023.    Outpatient Physical Therapy 2 times weekly for 12 weeks to include the following interventions: Aquatic Therapy, Electrical Stimulation prn, Gait Training, Iontophoresis (with dexamethasone prn), Manual Therapy, Moist Heat/ Ice, Neuromuscular Re-ed, Orthotic Management and Training, Patient Education, Self Care, Therapeutic Activities, and Therapeutic Exercise. Progress HEP towards D/C. Recommend F/U with MD if symptoms worsen or do not resolve. Patient may be seen by a PTA for treatment to carry out their plan of care.  Face-to-face conferences will be held.     Evonne Pope, PT        Physician's Signature: _________________________________________ Date: ________________    "

## 2023-08-14 ENCOUNTER — OFFICE VISIT (OUTPATIENT)
Dept: SPORTS MEDICINE | Facility: CLINIC | Age: 73
End: 2023-08-14
Payer: MEDICARE

## 2023-08-14 VITALS
SYSTOLIC BLOOD PRESSURE: 148 MMHG | HEIGHT: 64 IN | WEIGHT: 123.88 LBS | DIASTOLIC BLOOD PRESSURE: 80 MMHG | BODY MASS INDEX: 21.15 KG/M2

## 2023-08-14 DIAGNOSIS — M17.12 PRIMARY OSTEOARTHRITIS OF LEFT KNEE: Primary | ICD-10-CM

## 2023-08-14 PROCEDURE — 1101F PR PT FALLS ASSESS DOC 0-1 FALLS W/OUT INJ PAST YR: ICD-10-PCS | Mod: CPTII,S$GLB,, | Performed by: PHYSICIAN ASSISTANT

## 2023-08-14 PROCEDURE — 3288F FALL RISK ASSESSMENT DOCD: CPT | Mod: CPTII,S$GLB,, | Performed by: PHYSICIAN ASSISTANT

## 2023-08-14 PROCEDURE — 3044F HG A1C LEVEL LT 7.0%: CPT | Mod: CPTII,S$GLB,, | Performed by: PHYSICIAN ASSISTANT

## 2023-08-14 PROCEDURE — 99999 PR PBB SHADOW E&M-EST. PATIENT-LVL III: CPT | Mod: PBBFAC,,, | Performed by: PHYSICIAN ASSISTANT

## 2023-08-14 PROCEDURE — 1160F RVW MEDS BY RX/DR IN RCRD: CPT | Mod: CPTII,S$GLB,, | Performed by: PHYSICIAN ASSISTANT

## 2023-08-14 PROCEDURE — 3044F PR MOST RECENT HEMOGLOBIN A1C LEVEL <7.0%: ICD-10-PCS | Mod: CPTII,S$GLB,, | Performed by: PHYSICIAN ASSISTANT

## 2023-08-14 PROCEDURE — 1125F PR PAIN SEVERITY QUANTIFIED, PAIN PRESENT: ICD-10-PCS | Mod: CPTII,S$GLB,, | Performed by: PHYSICIAN ASSISTANT

## 2023-08-14 PROCEDURE — 3008F PR BODY MASS INDEX (BMI) DOCUMENTED: ICD-10-PCS | Mod: CPTII,S$GLB,, | Performed by: PHYSICIAN ASSISTANT

## 2023-08-14 PROCEDURE — 3077F PR MOST RECENT SYSTOLIC BLOOD PRESSURE >= 140 MM HG: ICD-10-PCS | Mod: CPTII,S$GLB,, | Performed by: PHYSICIAN ASSISTANT

## 2023-08-14 PROCEDURE — 3008F BODY MASS INDEX DOCD: CPT | Mod: CPTII,S$GLB,, | Performed by: PHYSICIAN ASSISTANT

## 2023-08-14 PROCEDURE — 1101F PT FALLS ASSESS-DOCD LE1/YR: CPT | Mod: CPTII,S$GLB,, | Performed by: PHYSICIAN ASSISTANT

## 2023-08-14 PROCEDURE — 3079F DIAST BP 80-89 MM HG: CPT | Mod: CPTII,S$GLB,, | Performed by: PHYSICIAN ASSISTANT

## 2023-08-14 PROCEDURE — 1159F PR MEDICATION LIST DOCUMENTED IN MEDICAL RECORD: ICD-10-PCS | Mod: CPTII,S$GLB,, | Performed by: PHYSICIAN ASSISTANT

## 2023-08-14 PROCEDURE — 3079F PR MOST RECENT DIASTOLIC BLOOD PRESSURE 80-89 MM HG: ICD-10-PCS | Mod: CPTII,S$GLB,, | Performed by: PHYSICIAN ASSISTANT

## 2023-08-14 PROCEDURE — 99214 PR OFFICE/OUTPT VISIT, EST, LEVL IV, 30-39 MIN: ICD-10-PCS | Mod: 24,S$GLB,, | Performed by: PHYSICIAN ASSISTANT

## 2023-08-14 PROCEDURE — 99214 OFFICE O/P EST MOD 30 MIN: CPT | Mod: 24,S$GLB,, | Performed by: PHYSICIAN ASSISTANT

## 2023-08-14 PROCEDURE — 1125F AMNT PAIN NOTED PAIN PRSNT: CPT | Mod: CPTII,S$GLB,, | Performed by: PHYSICIAN ASSISTANT

## 2023-08-14 PROCEDURE — 1159F MED LIST DOCD IN RCRD: CPT | Mod: CPTII,S$GLB,, | Performed by: PHYSICIAN ASSISTANT

## 2023-08-14 PROCEDURE — 3288F PR FALLS RISK ASSESSMENT DOCUMENTED: ICD-10-PCS | Mod: CPTII,S$GLB,, | Performed by: PHYSICIAN ASSISTANT

## 2023-08-14 PROCEDURE — 99999 PR PBB SHADOW E&M-EST. PATIENT-LVL III: ICD-10-PCS | Mod: PBBFAC,,, | Performed by: PHYSICIAN ASSISTANT

## 2023-08-14 PROCEDURE — 1160F PR REVIEW ALL MEDS BY PRESCRIBER/CLIN PHARMACIST DOCUMENTED: ICD-10-PCS | Mod: CPTII,S$GLB,, | Performed by: PHYSICIAN ASSISTANT

## 2023-08-14 PROCEDURE — 3077F SYST BP >= 140 MM HG: CPT | Mod: CPTII,S$GLB,, | Performed by: PHYSICIAN ASSISTANT

## 2023-08-14 NOTE — PROGRESS NOTES
ESTABLISHED PATIENT    History 8/14/2023:  Jessica returns here today for follow-up evaluation of her left knee.  She complains today of having recurrent pain over the last 2 weeks.  The previous Orthovisc series gave her significant relief and lasted approximately 8 months.    Of note, she is scheduled to see Dr. Emmanuel next month for her right shoulder and to discuss reverse shoulder replacement.    History 1/23/2023:  Jessica was seen for right shoulder pain.  She states her left knee pain has resolved since receiving the Orthovisc series.    History 12/5/2022:  Jessica returns here today for her 3rd left knee Orthovisc injection.    History 11/28/2022:  Jessica returns here today for her 2nd left knee Orthovisc injection.    History 11/14/2022:  Jessica returns here today for her 1st left knee Orthovisc injection.    History 10/31/2022:  73 y.o. Female with a history of left knee pain who reports having chronic patellar instability with 3 dislocations requiring manual reduction beginning when she was 11 years old.  She has had 2 arthroscopic procedures which were clean out procedures and not reconstructive.  She was previously seen by another provider and given that Euflexxa injections approximately 4 years ago.  She states these injections give her temporary relief.  She recently completed an extended course of physical therapy which also gave her relief.  Her biggest complaint today is anterior knee pain when sitting for any length of time.  She denies having any mechanical catching or popping sensations.  She has had no recent subluxation or instability events.        - mechanical symptoms, - instability    Is affecting ADLs.  Pain is 10/10 at it's worst.        PAST MEDICAL HISTORY    Past Medical History:   Diagnosis Date    Allergic rhinitis     Amblyopia     Carotid stenosis     Coronary atherosclerosis     [facility] 6/2014: 20% mLAD, 50% mCx, 20%, mRCA    Dyslipidemia     ESBL (extended spectrum  beta-lactamase) producing bacteria infection     UTI    Hypertension     Nausea and vomiting 2017    Subarachnoid hemorrhage due to ruptured aneurysm        PAST SURGICAL HISTORY     Past Surgical History:   Procedure Laterality Date    ADENOIDECTOMY      ANTERIOR CRUCIATE LIGAMENT REPAIR      APPENDECTOMY      CATARACT EXTRACTION W/  INTRAOCULAR LENS IMPLANT Right 2022    Procedure: EXTRACTION, CATARACT, WITH IOL INSERTION;  Surgeon: Higinio Cristina MD;  Location: [facility];  Service: Ophthalmology;  Laterality: Right;    CATARACT EXTRACTION W/  INTRAOCULAR LENS IMPLANT Left 2022    Procedure: EXTRACTION, CATARACT, WITH IOL INSERTION;  Surgeon: Higinio Cristina MD;  Location: [facility];  Service: Ophthalmology;  Laterality: Left;    CEREBRAL ANEURYSM REPAIR      clip     SECTION      DENTAL SURGERY      EYE SURGERY Bilateral     cataract removal and lens implants    HIP ARTHROPLASTY Left 2023    Procedure: TOTAL HIP ARTHROPLASTY;  Surgeon: Juan Wan MD;  Location: [address];  Service: Orthopedics;  Laterality: Left;    TONSILLECTOMY         FAMILY HISTORY    Family History   Problem Relation Age of Onset    Hypertension Mother     Aneurysm Mother     Hypertension Father     Alcohol abuse Father     Kidney disease Brother     Heart disease Brother     Gout Brother     No Known Problems Daughter     No Known Problems Daughter     No Known Problems Daughter        SOCIAL HISTORY    Social History     Socioeconomic History    Marital status:    Occupational History    Occupation: Retired   Tobacco Use    Smoking status: Former     Current packs/day: 0.00     Average packs/day: 0.5 packs/day for 20.0 years (10.0 ttl pk-yrs)     Types: Cigarettes     Start date: 1979     Quit date: 1999     Years since quittin.6     Passive exposure: Past    Smokeless tobacco: Never   Substance and Sexual Activity    Alcohol use: Yes     Alcohol/week: 7.0  standard drinks of alcohol     Types: 7 Glasses of wine per week    Drug use: No    Sexual activity: Yes     Partners: Male   Social History Narrative    .  Has 3 grown daughters.   lives in [facility] [place].       Social Determinants of Health     Financial Resource Strain: Low Risk  (6/9/2023)    Overall Financial Resource Strain (CARDIA)     Difficulty of Paying Living Expenses: Not very hard   Food Insecurity: No Food Insecurity (6/9/2023)    Hunger Vital Sign     Worried About Running Out of Food in the Last Year: Never true     Ran Out of Food in the Last Year: Never true   Transportation Needs: No Transportation Needs (6/9/2023)    PRAPARE - Transportation     Lack of Transportation (Medical): No     Lack of Transportation (Non-Medical): No   Physical Activity: Sufficiently Active (6/9/2023)    Exercise Vital Sign     Days of Exercise per Week: 7 days     Minutes of Exercise per Session: 60 min   Recent Concern: Physical Activity - Insufficiently Active (4/12/2023)    Exercise Vital Sign     Days of Exercise per Week: 3 days     Minutes of Exercise per Session: 30 min   Stress: No Stress Concern Present (6/9/2023)    [place] [employer] - Occupational Stress Questionnaire     Feeling of Stress : Only a little   Social Connections: Socially Integrated (6/9/2023)    Social Connection and Isolation Panel [NHANES]     Frequency of Communication with Friends and Family: More than three times a week     Frequency of Social Gatherings with Friends and Family: More than three times a week     Attends [employer] Services: 1 to 4 times per year     Active Member of Clubs or Organizations: Yes     Attends Club or Organization Meetings: More than 4 times per year     Marital Status:    Housing Stability: Low Risk  (6/9/2023)    Housing Stability Vital Sign     Unable to Pay for Housing in the Last Year: No     Number of Places Lived in the Last Year: 1     Unstable Housing in the Last  Year: No       MEDICATIONS      Current Outpatient Medications:     allopurinoL (ZYLOPRIM) 100 MG tablet, Take 2 tablets (200 mg total) by mouth once daily., Disp: 60 tablet, Rfl: 11    amLODIPine (NORVASC) 5 MG tablet, Take 1 tablet (5 mg total) by mouth once daily., Disp: 30 tablet, Rfl: 3    atorvastatin (LIPITOR) 80 MG tablet, TAKE 1 TABLET BY MOUTH EVERY DAY, Disp: 90 tablet, Rfl: 3    calcium-vitamin D3 (OS-WHITNEY 500 + D3) 500 mg-5 mcg (200 unit) per tablet, Take 2 tablets by mouth once daily., Disp: 15 tablet, Rfl: 0    carvediloL (COREG) 6.25 MG tablet, Take 1 tablet (6.25 mg total) by mouth 2 (two) times daily with meals., Disp: 60 tablet, Rfl: 11    clotrimazole (LOTRIMIN) 1 % Soln, Apply topically once daily. To affected toenails., Disp: 30 mL, Rfl: 6    DEPO-MEDROL 40 mg/mL injection, , Disp: , Rfl:     fluticasone propionate (FLONASE) 50 mcg/actuation nasal spray, SPRAY ONCE INTO EACH NOSTRIL ONCE DAILY, Disp: 16 mL, Rfl: 3    hydroCHLOROthiazide (HYDRODIURIL) 25 MG tablet, Take 1 tablet (25 mg total) by mouth once daily., Disp: 90 tablet, Rfl: 3    melatonin (MELATIN) 3 mg tablet, Take 2 tablets (6 mg total) by mouth nightly as needed for Insomnia., Disp: , Rfl: 0    montelukast (SINGULAIR) 10 mg tablet, Take 1 tablet (10 mg total) by mouth every evening., Disp: 90 tablet, Rfl: 11    pantoprazole (PROTONIX) 40 MG tablet, TAKE 1 TABLET BY MOUTH EVERY DAY, Disp: 90 tablet, Rfl: 0    polyethylene glycol (GLYCOLAX) 17 gram PwPk, Take 17 g by mouth once daily., Disp: , Rfl: 0    pregabalin (LYRICA) 75 MG capsule, Take 1 capsule (75 mg total) by mouth every evening., Disp: 30 capsule, Rfl: 6    vitamin D (VITAMIN D3) 50 mcg (2,000 unit) Tab, Take 1 tablet (2,000 Units total) by mouth once daily., Disp: , Rfl:     senna-docusate 8.6-50 mg (PERICOLACE) 8.6-50 mg per tablet, Take 1 tablet by mouth 2 (two) times daily., Disp: 60 tablet, Rfl: 3    VENOFER 100 mg iron/5 mL injection, , Disp: , Rfl:     ALLERGIES  "    Review of patient's allergies indicates:  No Known Allergies      REVIEW OF SYSTEMS   Constitution: Negative. Negative for chills, fever and night sweats.   HENT: Negative for congestion and headaches.    Eyes: Negative for blurred vision, left vision loss and right vision loss.   Cardiovascular: Negative for chest pain and syncope.   Respiratory: Negative for cough and shortness of breath.    Endocrine: Negative for polydipsia, polyphagia and polyuria.   Hematologic/Lymphatic: Negative for bleeding problem. Does not bruise/bleed easily.   Skin: Negative for dry skin, itching and rash.   Musculoskeletal: Negative for falls. Positive for left knee pain.  Gastrointestinal: Negative for abdominal pain and bowel incontinence.   Genitourinary: Negative for bladder incontinence and nocturia.   Neurological: Negative for disturbances in coordination, loss of balance and seizures.   Psychiatric/Behavioral: Negative for depression. The patient does not have insomnia.    Allergic/Immunologic: Negative for hives and persistent infections.     PHYSICAL EXAMINATION    Vitals: BP (!) 148/80 (BP Location: Right arm, Patient Position: Sitting, BP Method: Small (Manual))   Ht 5' 4" (1.626 m)   Wt 56.2 kg (123 lb 14.4 oz)   BMI 21.27 kg/m²     General: The patient appears active and healthy with no apparent physical problems.  No disturbance of mood or affect is demonstrated. Alert and Oriented.    HEENT: Eyes normal, pupils equally round, nose normal.    Resp: Equal and symmetrical chest rises. No wheezing    CV: Regular rate    Neck: Supple; nonpainful range of motion.    Extremities: no cyanosis, clubbing, edema, or diffuse swelling.  Palpable pulses, good capillary refill of the digits.  No coolness, discoloration, edema or obvious varicosities.    Skin: no lesions noted.    Lymphatic: no detected adenopathy in the upper or lower extremities.    Neurologic: normal mental status, normal reflexes, normal gait and balance.  " Patient is alert and oriented to person, place and time.  No flaccidity or spasticity is noted.  No motor or sensory deficits are noted.  Light touch is intact    Orthopaedic: Knee Musculoskeletal Exam  Gait    Antalgic: left    Assistive device: cane    Inspection    Left      Left knee inspection is normal.       Erythema: none        Effusion: none        Edema: none        Ecchymosis: none        Deformity: none        Alignment: normal        Previous incision: no previous incision    Palpation    Left      Increased warmth: none        Masses: none        Crepitus: patellofemoral        Tenderness: present          Lateral joint line: moderate          Lateral retinaculum: mild          LCL: mild          Medial joint line: moderate          Medial retinaculum: mild          Patella: mild          Patellar tendon: mild      Range of Motion    Left      Left knee range of motion is normal and full.      Strength    Left      Left knee strength is normal.       Extension: 5/5. Extension is not affected by pain.       Flexion: 5/5. Flexion is not affected by pain.      Instability    Left      Instability signs: none - stable      Varus stress grade: normal      Valgus stress grade: normal      Anterior drawer: normal      Posterior drawer: normal      Medial Carlton test: negative      Lateral Carlton test: negative      Lachman: negative    Neurovascular    Left      Left knee neurovascular exam is normal.        Pulses - DP: normal      Pulses - PT: normal    Special Signs    Left      Left knee special signs are normal.      Straight leg raise: normal      J sign: none        Patellar compression: moderate        Patellar apprehension: moderate        Patellar glide medial: 1      Patellar glide lateral: 1        IMAGING    X-Ray Knee 1 or 2 View Left  Order: [id number]  Status: Final result     Visible to patient: Yes (not seen)     Next appt: 08/16/2023 at 11:00 AM in Lab ([facility], [facility])     Dx: Leg  pain     0 Result Notes  Details    Reading Physician Reading Date Result Priority   Rich Anderson MD  [phone]  [phone] 5/26/2023 STAT     Narrative & Impression  EXAMINATION:  XR KNEE 1 OR 2 VIEW LEFT     CLINICAL HISTORY:  fall ;  Pain in leg, unspecified     TECHNIQUE:  Two views of the left knee     COMPARISON:  10/31/2022     FINDINGS:  No radiographic evidence of acute displaced fracture or dislocation of the left knee.  There is mild narrowing of the medial and lateral tibiofemoral compartments.  No significant suprapatellar joint effusion appreciated.  There are prominent vascular calcifications noted.     Impression:     No radiographic evidence of acute displaced fracture or dislocation of the left knee.        Electronically signed by: Rich Anderson MD  Date:                                            05/26/2023  Time:                                           22:53           Exam Ended: 05/26/23 22:42 Last Resulted: 05/26/23 22:53               X-rays including four views of the left knee were completed on 5/26/23. The images and report were reviewed by me personally.  There are no acute findings.  No fracture or dislocation.  No significant joint space narrowing or periarticular osteophyte formation.  There is a small area of calcification adjacent to the lateral femoral condyle.    IMPRESSION       ICD-10-CM ICD-9-CM   1. Primary osteoarthritis of left knee  M17.12 715.16     73-year-old female with recurrent left knee pain following patellar dislocation x 3.  She has had no recent instability events.  Her history and physical examination are consistent with patellofemoral osteoarthritis.  She has done well with injections in the past and would like to repeat the Orthovisc series once authorization has been given.    MEDICATIONS PRESCRIBED      None    RECOMMENDATIONS     Surgical and non-surgical treatment options for left knee osteoarthritis discussed  Authorization request for left  knee Orthovisc series placed today  RTC for Orthovisc #1 injection once approved      Procedures      All questions were answered, pt will contact us for questions or concerns in the interim.

## 2023-08-15 ENCOUNTER — CLINICAL SUPPORT (OUTPATIENT)
Dept: REHABILITATION | Facility: OTHER | Age: 73
End: 2023-08-15
Payer: MEDICARE

## 2023-08-15 ENCOUNTER — PATIENT MESSAGE (OUTPATIENT)
Dept: HEMATOLOGY/ONCOLOGY | Facility: CLINIC | Age: 73
End: 2023-08-15
Payer: MEDICARE

## 2023-08-15 DIAGNOSIS — R53.1 DECREASED RANGE OF MOTION WITH DECREASED STRENGTH: ICD-10-CM

## 2023-08-15 DIAGNOSIS — M25.60 DECREASED RANGE OF MOTION WITH DECREASED STRENGTH: ICD-10-CM

## 2023-08-15 DIAGNOSIS — Z74.09 IMPAIRED FUNCTIONAL MOBILITY, BALANCE, GAIT, AND ENDURANCE: Primary | ICD-10-CM

## 2023-08-15 PROCEDURE — 97112 NEUROMUSCULAR REEDUCATION: CPT | Mod: HCNC,PN | Performed by: INTERNAL MEDICINE

## 2023-08-15 PROCEDURE — 97140 MANUAL THERAPY 1/> REGIONS: CPT | Mod: HCNC,PN | Performed by: INTERNAL MEDICINE

## 2023-08-15 PROCEDURE — 97110 THERAPEUTIC EXERCISES: CPT | Mod: HCNC,PN | Performed by: INTERNAL MEDICINE

## 2023-08-15 NOTE — PROGRESS NOTES
Physical Therapy Daily Treatment Note     Name: Jessica Floyd  Clinic Number: [id number]             Therapy Diagnosis:   Encounter Diagnoses   Name Primary?    Impaired functional mobility, balance, gait, and endurance Yes    Decreased range of motion with decreased strength      Physician: Mckay Cosby NP    Visit Date: 8/15/2023      Physician Orders: PT Eval and Treat -- Weight bear as tolerated and ROMAT - caution with posterior hip activities, Work on core strength  Medical Diagnosis from Referral: S72.002A (ICD-10-CM) - Left displaced femoral neck fracture   Evaluation Date: 8/2/2023  Authorization Period Expiration: 10/01/2023  Plan of Care Expiration: 11/2/2023  Progress Note Due: 9/2/2023  Visit # / Visits authorized: 2/ 3  FOTO: 1/ 3       Time In: 815  Time Out: 9a  Total Billable Time: 45 minutes       Precautions: Standard, 1997 brain aneurysm burst impacting L field of vision and temporary paralysis on L side   Subjective     Pt reports: L knee pain is bothering her, Needs L TSA. L hip feels great. Has to wait to get her L knee injection. 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   She was compliant with home exercise program.  Response to previous treatment: no problems with L hip.  Functional change: none    Pain: 0/10  Location: left hip    Objective      WNL=within normal limits  WFL=within functional limits  NT=not tested  !=pain      8/2 Posture: slight trunk lean to R, flexed posture  8/2 Palpation: TTP glutes, TFL, ITB, proximal hip flexors     8/2/23  Lumbar AROM Pain/Dysfunction with Movement:   Flexion WFL Good spine movement, decreased hip flex >90 per MD protocol   Right rotation WFL No pain   Left rotation WFL No pain         8/2/23 Strength:  Hip Left  Right    Flexion 4-/5 4/5   Abduction 3+/5 3+   Adduction 4- 4-/5   Extension 3-  Difficulty clearing mat  4- with hip substitution movements       Knee Left  Right   Extension 4/5 4-/5      Ankle Left  Right    Dorsiflexion 4/5 4/5  "  Plantarflexion 13 heel raises with SC  13 heel raises with SC         Knee Left  Right    Extension 0 0   Flexion 130 140      Ankle Left  Right    Dorsiflexion 7 6   Plantarflexion 51 40      23 Flexibility: HS = poor (<70 deg sera), ely's (quads) = poor sera     23     TU seconds with RW     30 second sit-to-stand test (without UE support): 0 without UE support; 5 with UE support     8/15/23 Gait Analysis: Mod I with SPC, step through, bradykinetic with WBOS     Gross movement:   T/f sit<>stand: mod I with HHAx1, following hip precautions     23 Balance:   SLS = UA without LOB  Staggered stance = UA >10" w/o LOB  NBOS = 1-" with LOB     Impairment/Limitation/Restriction for FOTO Hip Survey     Therapist reviewed FOTO scores for Jessica Floyd on 2023.   FOTO documents entered into [employer] - see Media section.           Treatment            Jessica received the treatments listed below:        therapeutic exercises to develop strength, endurance, ROM, flexibility, posture, and core stabilization for  20 minutes including:     Reviewed for HEP today, progress reps next visit:  Glute sets: 5"x10  Straight leg raise: x 10  hip ADD 10 x 5" -  2   bridge with ball squeeze  2 x 10   HL 3-way clam x 10 x YTB   Sidelying hip abduction: attempted but UA 2* pain in R shoulder (RTC tear)  Long arc quads: 30x   B HR  30x  Hip abd stand 15x 2  B       manual therapy techniques: Joint mobilizations and Manual traction were applied to the: L hip for  10 minutes, including:  Gentle PROM into flex L 20 sec x 3   Gentle inf glide np   PROM ER/ MR flex 90 gentle 20 sec x 3      neuromuscular re-education activities to improve: Balance, Coordination, Kinesthetic, Sense, Proprioception, and Posture for 15 minutes. The following activities were included:  NBOS 3x 30" EO  Side steps parallel bars 10 ft x 6   Straight leg raise: 2 x 10 with A   Quad sets: 5"x10   SLS 3 x 15" EO     therapeutic activities to improve " "functional performance for 0 minutes, including:  Sit<>stands: 10 with B UE support     gait training to improve functional mobility and safety for 00  minutes, including:  None today     Patient Education and Home Exercises      Education provided:   - cont hep as namita           Written Home Exercises Provided:  no . Exercises were reviewed and Jessica was able to demonstrate them prior to the end of the session.  Jessica demonstrated good  understanding of the education provided. See EMR under Patient Instructions for exercises provided during therapy sessions.      See EMR under Patient Instructions for exercises provided prior visit.    Assessment     Progressing with improved ex ability. Needs help cueing and focus during session.   Jessica Is progressing well towards her goals.   Pt prognosis is Good.     Pt will continue to benefit from skilled outpatient physical therapy to address the deficits listed in the problem list box on initial evaluation, provide pt/family education and to maximize pt's level of independence in the home and community environment.     Pt's spiritual, cultural and educational needs considered and pt agreeable to plan of care and goals.    Anticipated barriers to physical therapy: L shoulder OA, focus     Goals:  Short Term Goals (6 Weeks):  1. Pt able to stand >=30 minutes with ADLs with <3/10 pain. Progressing, not met  2. Pt able to walk >=30 minutes with household chores with <3/10 pain. Progressing, not met  3. Pt able to ascend/descend curb, independently, 1 handrail, with <3/10 pain. Progressing, not met  4. Pt able to transfer in/out of chair 8x in 30 sec with 1 HHA to demonstrate improving functional strength Progressing, not met  5. Pt to improve TUG test to >/=25" to demonstrate improving motor control/coordination with transfers.  Progressing, not met  6. Pt to improve static balance with OLVERA balance test >/=46/56 for stability with ADLs.Progressing, not met     Long Term " "Goals (12 Weeks):  1. Pt able to stand >=45 minutes with ADLs with <3/10 pain. Progressing, not met  2. Pt able to walk >=45 minutes with household chores with <3/10 pain.  Progressing, not met  3. Pt able to ascend/descend 1 set of stairs, independently, 1 handrail, with <3/10 pain. Progressing, not met  4. Pt able to transfer in/out of chair 10x in 30 sec with 1 HHA to demonstrate improving functional strength Progressing, not met  5. Pt to improve TUG test to >/=15" to demonstrate improving motor control/coordination with transfers.  Progressing, not met  6. Pt to improve static balance with OLVERA balance test >/=46/56 for stability with ADLs. Progressing, not met  7. Pt to demonstrate improved dynamic balance with DGI >/=19/24 for safety with ambulation. Progressing, not met       Plan     Plan of care Certification: 8/2/2023 to 11/2/2023.     Outpatient Physical Therapy 2 times weekly for 12 weeks to include the following interventions: Aquatic Therapy, Electrical Stimulation prn, Gait Training, Iontophoresis (with dexamethasone prn), Manual Therapy, Moist Heat/ Ice, Neuromuscular Re-ed, Orthotic Management and Training, Patient Education, Self Care, Therapeutic Activities, and Therapeutic Exercise. Progress HEP towards D/C. Recommend F/U with MD if symptoms worsen or do not resolve. Patient may be seen by a PTA for treatment to carry out their plan of care.  Face-to-face conferences will be held.     Nell Kaiser, PT        "

## 2023-08-16 ENCOUNTER — LAB VISIT (OUTPATIENT)
Dept: LAB | Facility: HOSPITAL | Age: 73
End: 2023-08-16
Attending: PODIATRIST
Payer: MEDICARE

## 2023-08-16 ENCOUNTER — PATIENT MESSAGE (OUTPATIENT)
Dept: ADMINISTRATIVE | Facility: OTHER | Age: 73
End: 2023-08-16
Payer: MEDICARE

## 2023-08-16 ENCOUNTER — TELEPHONE (OUTPATIENT)
Dept: ORTHOPEDICS | Facility: CLINIC | Age: 73
End: 2023-08-16
Payer: MEDICARE

## 2023-08-16 DIAGNOSIS — D53.9 NUTRITIONAL ANEMIA: ICD-10-CM

## 2023-08-16 LAB
ANISOCYTOSIS BLD QL SMEAR: SLIGHT
BASOPHILS # BLD AUTO: 0.16 K/UL (ref 0–0.2)
BASOPHILS NFR BLD: 1.7 % (ref 0–1.9)
DIFFERENTIAL METHOD: ABNORMAL
EOSINOPHIL # BLD AUTO: 0.6 K/UL (ref 0–0.5)
EOSINOPHIL NFR BLD: 5.9 % (ref 0–8)
ERYTHROCYTE [DISTWIDTH] IN BLOOD BY AUTOMATED COUNT: 17.9 % (ref 11.5–14.5)
FERRITIN SERPL-MCNC: 49 NG/ML (ref 20–300)
FOLATE SERPL-MCNC: 28.2 NG/ML (ref 4–24)
HCT VFR BLD AUTO: 41.3 % (ref 37–48.5)
HGB BLD-MCNC: 13 G/DL (ref 12–16)
IMM GRANULOCYTES # BLD AUTO: 0.05 K/UL (ref 0–0.04)
IMM GRANULOCYTES NFR BLD AUTO: 0.5 % (ref 0–0.5)
IRON SERPL-MCNC: 65 UG/DL (ref 30–160)
LYMPHOCYTES # BLD AUTO: 2.3 K/UL (ref 1–4.8)
LYMPHOCYTES NFR BLD: 23.8 % (ref 18–48)
MCH RBC QN AUTO: 32.5 PG (ref 27–31)
MCHC RBC AUTO-ENTMCNC: 31.5 G/DL (ref 32–36)
MCV RBC AUTO: 103 FL (ref 82–98)
MONOCYTES # BLD AUTO: 2 K/UL (ref 0.3–1)
MONOCYTES NFR BLD: 21.3 % (ref 4–15)
NEUTROPHILS # BLD AUTO: 4.5 K/UL (ref 1.8–7.7)
NEUTROPHILS NFR BLD: 46.8 % (ref 38–73)
NRBC BLD-RTO: 0 /100 WBC
PLATELET # BLD AUTO: 325 K/UL (ref 150–450)
PLATELET BLD QL SMEAR: ABNORMAL
PMV BLD AUTO: 11.7 FL (ref 9.2–12.9)
POLYCHROMASIA BLD QL SMEAR: ABNORMAL
RBC # BLD AUTO: 4 M/UL (ref 4–5.4)
SATURATED IRON: 14 % (ref 20–50)
SMUDGE CELLS BLD QL SMEAR: PRESENT
TOTAL IRON BINDING CAPACITY: 469 UG/DL (ref 250–450)
TRANSFERRIN SERPL-MCNC: 317 MG/DL (ref 200–375)
VIT B12 SERPL-MCNC: 755 PG/ML (ref 210–950)
WBC # BLD AUTO: 9.54 K/UL (ref 3.9–12.7)

## 2023-08-16 PROCEDURE — 82728 ASSAY OF FERRITIN: CPT | Mod: HCNC | Performed by: STUDENT IN AN ORGANIZED HEALTH CARE EDUCATION/TRAINING PROGRAM

## 2023-08-16 PROCEDURE — 85025 COMPLETE CBC W/AUTO DIFF WBC: CPT | Mod: HCNC | Performed by: STUDENT IN AN ORGANIZED HEALTH CARE EDUCATION/TRAINING PROGRAM

## 2023-08-16 PROCEDURE — 84466 ASSAY OF TRANSFERRIN: CPT | Mod: HCNC | Performed by: STUDENT IN AN ORGANIZED HEALTH CARE EDUCATION/TRAINING PROGRAM

## 2023-08-16 PROCEDURE — 82746 ASSAY OF FOLIC ACID SERUM: CPT | Mod: HCNC | Performed by: STUDENT IN AN ORGANIZED HEALTH CARE EDUCATION/TRAINING PROGRAM

## 2023-08-16 PROCEDURE — 83540 ASSAY OF IRON: CPT | Mod: HCNC | Performed by: STUDENT IN AN ORGANIZED HEALTH CARE EDUCATION/TRAINING PROGRAM

## 2023-08-16 PROCEDURE — 36415 COLL VENOUS BLD VENIPUNCTURE: CPT | Mod: HCNC,PN | Performed by: STUDENT IN AN ORGANIZED HEALTH CARE EDUCATION/TRAINING PROGRAM

## 2023-08-16 PROCEDURE — 82607 VITAMIN B-12: CPT | Mod: HCNC | Performed by: STUDENT IN AN ORGANIZED HEALTH CARE EDUCATION/TRAINING PROGRAM

## 2023-08-16 NOTE — TELEPHONE ENCOUNTER
----- Message from Pamela Chavez sent at 8/16/2023  4:53 PM CDT -----  Type:  Needs Medical Advice    Who Called: pt  Symptoms (please be specific): pt had to reschedule appt due to transportation she needs to be seen on Friday morning is the easiest for her  to drive her    Would the patient rather a call back or a response via [facility]? call  Best Call Back Number: [phone]  Additional Information:

## 2023-08-16 NOTE — PROGRESS NOTES
Physical Therapy Daily Treatment Note     Name: Jessica Floyd  Clinic Number: [id number]             Therapy Diagnosis:   Encounter Diagnoses   Name Primary?    Impaired functional mobility, balance, gait, and endurance Yes    Decreased range of motion with decreased strength      Physician: Armani Cai PA-C    Visit Date: 8/17/2023      Physician Orders: PT Eval and Treat -- Weight bear as tolerated and ROMAT - caution with posterior hip activities, Work on core strength  Medical Diagnosis from Referral: S72.002A (ICD-10-CM) - Left displaced femoral neck fracture   Evaluation Date: 8/2/2023  Authorization Period Expiration: 10/01/2023  Plan of Care Expiration: 11/2/2023  Progress Note Due: 9/2/2023  Visit # / Visits authorized: 3/ 11  FOTO: 1/ 3       Time In: 0905  Time Out: 0945  Total Billable Time: 40 minutes       Precautions: Standard, 1997 brain aneurysm burst impacting L field of vision and temporary paralysis on L side   Subjective     Pt reports:hip is feeling great, but knee and should are hurting. She requests that exercise be gentle and light today.   She was compliant with home exercise program.  Response to previous treatment: no problems with L hip.  Functional change: none    Pain: 0/10  Location: left hip    Objective      WNL=within normal limits  WFL=within functional limits  NT=not tested  !=pain      8/2 Posture: slight trunk lean to R, flexed posture  8/2 Palpation: TTP glutes, TFL, ITB, proximal hip flexors     8/2/23  Lumbar AROM Pain/Dysfunction with Movement:   Flexion WFL Good spine movement, decreased hip flex >90 per MD protocol   Right rotation WFL No pain   Left rotation WFL No pain         8/2/23 Strength:  Hip Left  Right    Flexion 4-/5 4/5   Abduction 3+/5 3+   Adduction 4- 4-/5   Extension 3-  Difficulty clearing mat  4- with hip substitution movements       Knee Left  Right   Extension 4/5 4-/5      Ankle Left  Right    Dorsiflexion 4/5 4/5   Plantarflexion 13 heel raises  "with SC  13 heel raises with SC         Knee Left  Right    Extension 0 0   Flexion 130 140      Ankle Left  Right    Dorsiflexion 7 6   Plantarflexion 51 40      23 Flexibility: HS = poor (<70 deg sera), ely's (quads) = poor sera     23     TU seconds with RW     30 second sit-to-stand test (without UE support): 0 without UE support; 5 with UE support     8/15/23 Gait Analysis: Mod I with SPC, step through, bradykinetic with WBOS     Gross movement:   T/f sit<>stand: mod I with HHAx1, following hip precautions     23 Balance:   SLS = UA without LOB  Staggered stance = UA >10" w/o LOB  NBOS = 1-" with LOB     Impairment/Limitation/Restriction for FOTO Hip Survey     Therapist reviewed FOTO scores for Jessica Floyd on 2023.   FOTO documents entered into [employer] - see Media section.           Treatment            Jessica received the treatments listed below:        therapeutic exercises to develop strength, endurance, ROM, flexibility, posture, and core stabilization for  15 minutes including:     Reviewed for HEP today, progress reps next visit:  Glute sets: 5"x10  Straight leg raise: x 10  hip ADD 20 x 5"    bridge with ball squeeze  2 x 10   Straight leg raise: 2 x 10 with A   Quad sets: 5"x10   HL 3-way clam x 10 x YTB   Sidelying hip abduction: attempted but UA 2* pain in R shoulder (RTC tear)  Long arc quads: 30x   B HR  30x  Hip abd stand 15x 2  B       manual therapy techniques: Joint mobilizations and Manual traction were applied to the: L hip for  00 minutes, including:  Gentle PROM into flex L 20 sec x 3   Gentle inf glide np   PROM ER/ MR flex 90 gentle 20 sec x 3      neuromuscular re-education activities to improve: Balance, Coordination, Kinesthetic, Sense, Proprioception, and Posture for 00 minutes. The following activities were included:  NBOS 3x 30" EO  Side steps parallel bars 10 ft x 6   SLS 3 x 15" EO     therapeutic activities to improve functional performance for 25 minutes, " "including:  Pt education and questions answered   Sit<>stands: 10 with B UE support       gait training to improve functional mobility and safety for 00  minutes, including:  None today     Patient Education and Home Exercises      Education provided:   - cont hep as namita           Written Home Exercises Provided:  no . Exercises were reviewed and Jessica was able to demonstrate them prior to the end of the session.  Jessica demonstrated good  understanding of the education provided. See EMR under Patient Instructions for exercises provided during therapy sessions.      See EMR under Patient Instructions for exercises provided prior visit.    Assessment     Time spent in pt education and answering questions this morning. Therex kept gentle per pt request. She continues to require assistance for SLR, but decreases with instruction to DF foot for improved quad contraction.     Jessica Is progressing well towards her goals.   Pt prognosis is Good.     Pt will continue to benefit from skilled outpatient physical therapy to address the deficits listed in the problem list box on initial evaluation, provide pt/family education and to maximize pt's level of independence in the home and community environment.     Pt's spiritual, cultural and educational needs considered and pt agreeable to plan of care and goals.    Anticipated barriers to physical therapy: L shoulder OA, focus     Goals:  Short Term Goals (6 Weeks):  1. Pt able to stand >=30 minutes with ADLs with <3/10 pain. Progressing, not met  2. Pt able to walk >=30 minutes with household chores with <3/10 pain. Progressing, not met  3. Pt able to ascend/descend curb, independently, 1 handrail, with <3/10 pain. Progressing, not met  4. Pt able to transfer in/out of chair 8x in 30 sec with 1 HHA to demonstrate improving functional strength Progressing, not met  5. Pt to improve TUG test to >/=25" to demonstrate improving motor control/coordination with transfers.  " "Progressing, not met  6. Pt to improve static balance with OLVERA balance test >/=46/56 for stability with ADLs.Progressing, not met     Long Term Goals (12 Weeks):  1. Pt able to stand >=45 minutes with ADLs with <3/10 pain. Progressing, not met  2. Pt able to walk >=45 minutes with household chores with <3/10 pain.  Progressing, not met  3. Pt able to ascend/descend 1 set of stairs, independently, 1 handrail, with <3/10 pain. Progressing, not met  4. Pt able to transfer in/out of chair 10x in 30 sec with 1 HHA to demonstrate improving functional strength Progressing, not met  5. Pt to improve TUG test to >/=15" to demonstrate improving motor control/coordination with transfers.  Progressing, not met  6. Pt to improve static balance with OLVERA balance test >/=46/56 for stability with ADLs. Progressing, not met  7. Pt to demonstrate improved dynamic balance with DGI >/=19/24 for safety with ambulation. Progressing, not met       Plan     Plan of care Certification: 8/2/2023 to 11/2/2023.     Outpatient Physical Therapy 2 times weekly for 12 weeks to include the following interventions: Aquatic Therapy, Electrical Stimulation prn, Gait Training, Iontophoresis (with dexamethasone prn), Manual Therapy, Moist Heat/ Ice, Neuromuscular Re-ed, Orthotic Management and Training, Patient Education, Self Care, Therapeutic Activities, and Therapeutic Exercise. Progress HEP towards D/C. Recommend F/U with MD if symptoms worsen or do not resolve. Patient may be seen by a PTA for treatment to carry out their plan of care.  Face-to-face conferences will be held.     Pamela Medellin, PT      "

## 2023-08-17 ENCOUNTER — CLINICAL SUPPORT (OUTPATIENT)
Dept: REHABILITATION | Facility: OTHER | Age: 73
End: 2023-08-17
Payer: MEDICARE

## 2023-08-17 DIAGNOSIS — Z74.09 IMPAIRED FUNCTIONAL MOBILITY, BALANCE, GAIT, AND ENDURANCE: Primary | ICD-10-CM

## 2023-08-17 DIAGNOSIS — R53.1 DECREASED RANGE OF MOTION WITH DECREASED STRENGTH: ICD-10-CM

## 2023-08-17 DIAGNOSIS — M25.60 DECREASED RANGE OF MOTION WITH DECREASED STRENGTH: ICD-10-CM

## 2023-08-17 PROCEDURE — 97530 THERAPEUTIC ACTIVITIES: CPT | Mod: HCNC,PN | Performed by: PHYSICAL THERAPIST

## 2023-08-17 PROCEDURE — 97110 THERAPEUTIC EXERCISES: CPT | Mod: HCNC,PN | Performed by: PHYSICAL THERAPIST

## 2023-08-18 ENCOUNTER — OFFICE VISIT (OUTPATIENT)
Dept: HEMATOLOGY/ONCOLOGY | Facility: CLINIC | Age: 73
End: 2023-08-18
Payer: MEDICARE

## 2023-08-18 VITALS
HEART RATE: 75 BPM | DIASTOLIC BLOOD PRESSURE: 71 MMHG | RESPIRATION RATE: 16 BRPM | OXYGEN SATURATION: 95 % | WEIGHT: 124.31 LBS | SYSTOLIC BLOOD PRESSURE: 128 MMHG | HEIGHT: 64 IN | BODY MASS INDEX: 21.22 KG/M2 | TEMPERATURE: 98 F

## 2023-08-18 DIAGNOSIS — M25.511 CHRONIC RIGHT SHOULDER PAIN: ICD-10-CM

## 2023-08-18 DIAGNOSIS — D50.8 OTHER IRON DEFICIENCY ANEMIA: Primary | ICD-10-CM

## 2023-08-18 DIAGNOSIS — G89.29 CHRONIC RIGHT SHOULDER PAIN: ICD-10-CM

## 2023-08-18 DIAGNOSIS — N18.31 STAGE 3A CHRONIC KIDNEY DISEASE: ICD-10-CM

## 2023-08-18 PROCEDURE — 99215 OFFICE O/P EST HI 40 MIN: CPT | Mod: HCNC,S$GLB,, | Performed by: STUDENT IN AN ORGANIZED HEALTH CARE EDUCATION/TRAINING PROGRAM

## 2023-08-18 PROCEDURE — 1160F RVW MEDS BY RX/DR IN RCRD: CPT | Mod: HCNC,CPTII,S$GLB, | Performed by: STUDENT IN AN ORGANIZED HEALTH CARE EDUCATION/TRAINING PROGRAM

## 2023-08-18 PROCEDURE — 1160F PR REVIEW ALL MEDS BY PRESCRIBER/CLIN PHARMACIST DOCUMENTED: ICD-10-PCS | Mod: HCNC,CPTII,S$GLB, | Performed by: STUDENT IN AN ORGANIZED HEALTH CARE EDUCATION/TRAINING PROGRAM

## 2023-08-18 PROCEDURE — 3078F DIAST BP <80 MM HG: CPT | Mod: HCNC,CPTII,S$GLB, | Performed by: STUDENT IN AN ORGANIZED HEALTH CARE EDUCATION/TRAINING PROGRAM

## 2023-08-18 PROCEDURE — 3044F PR MOST RECENT HEMOGLOBIN A1C LEVEL <7.0%: ICD-10-PCS | Mod: HCNC,CPTII,S$GLB, | Performed by: STUDENT IN AN ORGANIZED HEALTH CARE EDUCATION/TRAINING PROGRAM

## 2023-08-18 PROCEDURE — 3044F HG A1C LEVEL LT 7.0%: CPT | Mod: HCNC,CPTII,S$GLB, | Performed by: STUDENT IN AN ORGANIZED HEALTH CARE EDUCATION/TRAINING PROGRAM

## 2023-08-18 PROCEDURE — 99999 PR PBB SHADOW E&M-EST. PATIENT-LVL IV: ICD-10-PCS | Mod: PBBFAC,HCNC,, | Performed by: STUDENT IN AN ORGANIZED HEALTH CARE EDUCATION/TRAINING PROGRAM

## 2023-08-18 PROCEDURE — 1125F AMNT PAIN NOTED PAIN PRSNT: CPT | Mod: HCNC,CPTII,S$GLB, | Performed by: STUDENT IN AN ORGANIZED HEALTH CARE EDUCATION/TRAINING PROGRAM

## 2023-08-18 PROCEDURE — 3074F PR MOST RECENT SYSTOLIC BLOOD PRESSURE < 130 MM HG: ICD-10-PCS | Mod: HCNC,CPTII,S$GLB, | Performed by: STUDENT IN AN ORGANIZED HEALTH CARE EDUCATION/TRAINING PROGRAM

## 2023-08-18 PROCEDURE — 3078F PR MOST RECENT DIASTOLIC BLOOD PRESSURE < 80 MM HG: ICD-10-PCS | Mod: HCNC,CPTII,S$GLB, | Performed by: STUDENT IN AN ORGANIZED HEALTH CARE EDUCATION/TRAINING PROGRAM

## 2023-08-18 PROCEDURE — 1159F PR MEDICATION LIST DOCUMENTED IN MEDICAL RECORD: ICD-10-PCS | Mod: HCNC,CPTII,S$GLB, | Performed by: STUDENT IN AN ORGANIZED HEALTH CARE EDUCATION/TRAINING PROGRAM

## 2023-08-18 PROCEDURE — 3008F BODY MASS INDEX DOCD: CPT | Mod: HCNC,CPTII,S$GLB, | Performed by: STUDENT IN AN ORGANIZED HEALTH CARE EDUCATION/TRAINING PROGRAM

## 2023-08-18 PROCEDURE — 3074F SYST BP LT 130 MM HG: CPT | Mod: HCNC,CPTII,S$GLB, | Performed by: STUDENT IN AN ORGANIZED HEALTH CARE EDUCATION/TRAINING PROGRAM

## 2023-08-18 PROCEDURE — 99215 PR OFFICE/OUTPT VISIT, EST, LEVL V, 40-54 MIN: ICD-10-PCS | Mod: HCNC,S$GLB,, | Performed by: STUDENT IN AN ORGANIZED HEALTH CARE EDUCATION/TRAINING PROGRAM

## 2023-08-18 PROCEDURE — 1125F PR PAIN SEVERITY QUANTIFIED, PAIN PRESENT: ICD-10-PCS | Mod: HCNC,CPTII,S$GLB, | Performed by: STUDENT IN AN ORGANIZED HEALTH CARE EDUCATION/TRAINING PROGRAM

## 2023-08-18 PROCEDURE — 3008F PR BODY MASS INDEX (BMI) DOCUMENTED: ICD-10-PCS | Mod: HCNC,CPTII,S$GLB, | Performed by: STUDENT IN AN ORGANIZED HEALTH CARE EDUCATION/TRAINING PROGRAM

## 2023-08-18 PROCEDURE — 99999 PR PBB SHADOW E&M-EST. PATIENT-LVL IV: CPT | Mod: PBBFAC,HCNC,, | Performed by: STUDENT IN AN ORGANIZED HEALTH CARE EDUCATION/TRAINING PROGRAM

## 2023-08-18 PROCEDURE — 1159F MED LIST DOCD IN RCRD: CPT | Mod: HCNC,CPTII,S$GLB, | Performed by: STUDENT IN AN ORGANIZED HEALTH CARE EDUCATION/TRAINING PROGRAM

## 2023-08-18 NOTE — PROGRESS NOTES
Hematology- Oncology Clinic Note :      8/18/2023    RFV / chief complaint- Other iron deficiency anemia        HPI  Pt is a 73 y.o. female who  has a past medical history of Allergic rhinitis, Amblyopia, Carotid stenosis, Coronary atherosclerosis, Dyslipidemia, ESBL (extended spectrum beta-lactamase) producing bacteria infection, Hypertension, Nausea and vomiting (05/26/2017), and Subarachnoid hemorrhage due to ruptured aneurysm (1999).   Pt presents to the clinic today for anemia    Here with daughter  C/o fatigue, stable   Onset of symptoms was gradual starting several weeks ago with unchanged course since that time. Symptoms are mild to moderate in intensity.  Patient denies chest pain, LOC, falls. Symptoms are aggravated by exertion. Symptoms improve with rest.   Bleeding- severe nose bleed in Feb 2023, went to the ER, rhino rocket was placed. Has seen ENT since then. No more bleeding recently. No other bleeding reported  Oral iron - has been taking with some gi side effects.     5/8/2023 5/15/2023 5/22/2023 6/22/2023 7/6/2023   Onc All Plan Medications        iron sucrose (VENOFER)  mg  200 mg  200 mg  200 mg  200 mg        Reviewed past medical/surgical/social history    Past Medical History:   Diagnosis Date    Allergic rhinitis     Amblyopia     Carotid stenosis     Coronary atherosclerosis     [facility] 6/2014: 20% mLAD, 50% mCx, 20%, mRCA    Dyslipidemia     ESBL (extended spectrum beta-lactamase) producing bacteria infection     UTI    Hypertension     Nausea and vomiting 05/26/2017    Subarachnoid hemorrhage due to ruptured aneurysm 1999      Past Surgical History:   Procedure Laterality Date    ADENOIDECTOMY      ANTERIOR CRUCIATE LIGAMENT REPAIR  2012    APPENDECTOMY      CATARACT EXTRACTION W/  INTRAOCULAR LENS IMPLANT Right 11/07/2022    Procedure: EXTRACTION, CATARACT, WITH IOL INSERTION;  Surgeon: Higinio Cristina MD;  Location: [facility];  Service: Ophthalmology;  Laterality: Right;     CATARACT EXTRACTION W/  INTRAOCULAR LENS IMPLANT Left 2022    Procedure: EXTRACTION, CATARACT, WITH IOL INSERTION;  Surgeon: Higinio Cristina MD;  Location: [facility];  Service: Ophthalmology;  Laterality: Left;    CEREBRAL ANEURYSM REPAIR      clip     SECTION      DENTAL SURGERY      EYE SURGERY Bilateral     cataract removal and lens implants    HIP ARTHROPLASTY Left 2023    Procedure: TOTAL HIP ARTHROPLASTY;  Surgeon: Juan Wan MD;  Location: [address];  Service: Orthopedics;  Laterality: Left;    TONSILLECTOMY        Review of patient's allergies indicates:  No Known Allergies   Social History     Tobacco Use    Smoking status: Former     Current packs/day: 0.00     Average packs/day: 0.5 packs/day for 20.0 years (10.0 ttl pk-yrs)     Types: Cigarettes     Start date: 1979     Quit date: 1999     Years since quittin.6     Passive exposure: Past    Smokeless tobacco: Never   Substance Use Topics    Alcohol use: Yes     Alcohol/week: 7.0 standard drinks of alcohol     Types: 7 Glasses of wine per week      Family History   Problem Relation Age of Onset    Hypertension Mother     Aneurysm Mother     Hypertension Father     Alcohol abuse Father     Kidney disease Brother     Heart disease Brother     Gout Brother     No Known Problems Daughter     No Known Problems Daughter     No Known Problems Daughter           Review of Systems :  Review of Systems   Constitutional:  Positive for malaise/fatigue. Negative for chills, diaphoresis, fever and weight loss.   HENT: Negative.  Negative for congestion, hearing loss, nosebleeds, sore throat and tinnitus.    Eyes: Negative.  Negative for blurred vision and discharge.   Respiratory:  Negative for cough, hemoptysis, sputum production, shortness of breath and wheezing.    Cardiovascular: Negative.  Negative for chest pain, palpitations and leg swelling.   Gastrointestinal: Negative.  Negative for abdominal pain, blood in  "stool, constipation, diarrhea, heartburn, melena, nausea and vomiting.   Genitourinary: Negative.    Musculoskeletal:  Positive for joint pain. Negative for back pain, falls and myalgias.   Skin: Negative.  Negative for itching and rash.   Neurological: Negative.  Negative for dizziness, tingling, sensory change, speech change, focal weakness, seizures, loss of consciousness, weakness and headaches.   Endo/Heme/Allergies: Negative.  Does not bruise/bleed easily.   Psychiatric/Behavioral: Negative.  Negative for depression. The patient is not nervous/anxious and does not have insomnia.                Physical Exam :  /71 (BP Location: Left arm, Patient Position: Sitting, BP Method: Medium (Automatic))   Pulse 75   Temp 98 °F (36.7 °C) (Oral)   Resp 16   Ht 5' 3.5" (1.613 m)   Wt 56.4 kg (124 lb 5.4 oz)   SpO2 95%   BMI 21.68 kg/m²   Wt Readings from Last 3 Encounters:   08/18/23 56.4 kg (124 lb 5.4 oz)   08/14/23 56.2 kg (123 lb 14.4 oz)   08/02/23 61.2 kg (135 lb)       Body mass index is 21.68 kg/m².      Physical Exam  Vitals and nursing note reviewed.   Constitutional:       General: She is not in acute distress.     Appearance: Normal appearance. She is not ill-appearing.   HENT:      Head: Normocephalic and atraumatic.      Right Ear: External ear normal.      Left Ear: External ear normal.      Mouth/Throat:      Pharynx: No oropharyngeal exudate.   Eyes:      General: No scleral icterus.        Right eye: No discharge.         Left eye: No discharge.   Cardiovascular:      Rate and Rhythm: Normal rate.   Pulmonary:      Effort: Pulmonary effort is normal. No respiratory distress.   Abdominal:      General: There is no distension.   Musculoskeletal:         General: No swelling or deformity.      Cervical back: Normal range of motion and neck supple.      Right lower leg: No edema.      Left lower leg: No edema.   Skin:     Coloration: Skin is not jaundiced.      Findings: No bruising, erythema, " lesion or rash.   Neurological:      General: No focal deficit present.      Mental Status: She is alert and oriented to person, place, and time. Mental status is at baseline.      Coordination: Coordination normal.      Gait: Gait normal.   Psychiatric:         Mood and Affect: Mood normal.         Behavior: Behavior normal.           Current Outpatient Medications   Medication Sig Dispense Refill    allopurinoL (ZYLOPRIM) 100 MG tablet Take 2 tablets (200 mg total) by mouth once daily. 60 tablet 11    amLODIPine (NORVASC) 5 MG tablet Take 1 tablet (5 mg total) by mouth once daily. 30 tablet 3    atorvastatin (LIPITOR) 80 MG tablet TAKE 1 TABLET BY MOUTH EVERY DAY 90 tablet 3    calcium-vitamin D3 (OS-WHITNEY 500 + D3) 500 mg-5 mcg (200 unit) per tablet Take 2 tablets by mouth once daily. 15 tablet 0    carvediloL (COREG) 6.25 MG tablet Take 1 tablet (6.25 mg total) by mouth 2 (two) times daily with meals. 60 tablet 11    clotrimazole (LOTRIMIN) 1 % Soln Apply topically once daily. To affected toenails. 30 mL 6    fluticasone propionate (FLONASE) 50 mcg/actuation nasal spray SPRAY ONCE INTO EACH NOSTRIL ONCE DAILY 16 mL 3    hydroCHLOROthiazide (HYDRODIURIL) 25 MG tablet Take 1 tablet (25 mg total) by mouth once daily. 90 tablet 3    melatonin (MELATIN) 3 mg tablet Take 2 tablets (6 mg total) by mouth nightly as needed for Insomnia.  0    montelukast (SINGULAIR) 10 mg tablet Take 1 tablet (10 mg total) by mouth every evening. 90 tablet 11    pantoprazole (PROTONIX) 40 MG tablet TAKE 1 TABLET BY MOUTH EVERY DAY 90 tablet 0    vitamin D (VITAMIN D3) 50 mcg (2,000 unit) Tab Take 1 tablet (2,000 Units total) by mouth once daily.      DEPO-MEDROL 40 mg/mL injection       polyethylene glycol (GLYCOLAX) 17 gram PwPk Take 17 g by mouth once daily. (Patient not taking: Reported on 8/18/2023)  0    pregabalin (LYRICA) 75 MG capsule Take 1 capsule (75 mg total) by mouth every evening. (Patient not taking: Reported on 8/18/2023)  30 capsule 6    senna-docusate 8.6-50 mg (PERICOLACE) 8.6-50 mg per tablet Take 1 tablet by mouth 2 (two) times daily. 60 tablet 3    VENOFER 100 mg iron/5 mL injection        No current facility-administered medications for this visit.       Pertinent Diagnostic studies:      Lab Visit on 08/16/2023   Component Date Value Ref Range Status    WBC 08/16/2023 9.54  3.90 - 12.70 K/uL Final    RBC 08/16/2023 4.00  4.00 - 5.40 M/uL Final    Hemoglobin 08/16/2023 13.0  12.0 - 16.0 g/dL Final    Hematocrit 08/16/2023 41.3  37.0 - 48.5 % Final    MCV 08/16/2023 103 (H)  82 - 98 fL Final    MCH 08/16/2023 32.5 (H)  27.0 - 31.0 pg Final    MCHC 08/16/2023 31.5 (L)  32.0 - 36.0 g/dL Final    RDW 08/16/2023 17.9 (H)  11.5 - 14.5 % Final    Platelets 08/16/2023 325  150 - 450 K/uL Final    MPV 08/16/2023 11.7  9.2 - 12.9 fL Final    Immature Granulocytes 08/16/2023 0.5  0.0 - 0.5 % Final    Gran # (ANC) 08/16/2023 4.5  1.8 - 7.7 K/uL Final    Immature Grans (Abs) 08/16/2023 0.05 (H)  0.00 - 0.04 K/uL Final    Comment: Mild elevation in immature granulocytes is non specific and   can be seen in a variety of conditions including stress response,   acute inflammation, trauma and pregnancy. Correlation with other   laboratory and clinical findings is essential.      Lymph # 08/16/2023 2.3  1.0 - 4.8 K/uL Final    Mono # 08/16/2023 2.0 (H)  0.3 - 1.0 K/uL Final    Eos # 08/16/2023 0.6 (H)  0.0 - 0.5 K/uL Final    Baso # 08/16/2023 0.16  0.00 - 0.20 K/uL Final    nRBC 08/16/2023 0  0 /100 WBC Final    Gran % 08/16/2023 46.8  38.0 - 73.0 % Final    Lymph % 08/16/2023 23.8  18.0 - 48.0 % Final    REACTIVE LYMPH PRESENT    Mono % 08/16/2023 21.3 (H)  4.0 - 15.0 % Final    Eosinophil % 08/16/2023 5.9  0.0 - 8.0 % Final    Basophil % 08/16/2023 1.7  0.0 - 1.9 % Final    Platelet Estimate 08/16/2023 Appears normal   Final    Aniso 08/16/2023 Slight   Final    Poly 08/16/2023 Occasional   Final    Smudge Cells 08/16/2023 Present   Final     Comment: Smudge cells present;Substantial numbers may affect the   accuracy of the differential.      Differential Method 08/16/2023 Automated   Final    Ferritin 08/16/2023 49  20.0 - 300.0 ng/mL Final    Iron 08/16/2023 65  30 - 160 ug/dL Final    Transferrin 08/16/2023 317  200 - 375 mg/dL Final    TIBC 08/16/2023 469 (H)  250 - 450 ug/dL Final    Saturated Iron 08/16/2023 14 (L)  20 - 50 % Final    Vitamin B-12 08/16/2023 755  210 - 950 pg/mL Final    Folate 08/16/2023 28.2 (H)  4.0 - 24.0 ng/mL Final           Oncology History    No history exists.     Assessment :       1. Other iron deficiency anemia    2. Stage 3a chronic kidney disease    3. Chronic right shoulder pain          Plan :       Iron def anemia due to blood loss /nose bleed/ surgeries  Intolerant to oral iron due to gi side effects.   Iv iron given in May with repeat labs in Aug with improvement in hb. Iron levels still low, plan for additional iron infusions   RTC 3- 4 months with repeat labs.   Continue iron rich diet , d/w pt      Discussed side effects of Injectafer (or venofer) which include but are not limited to infusion reaction, shortness of breath, hives, rash, flushing, itching, headaches, skin discoloration at the injection site, nausea, vomiting, low phosphorus level, elevated blood pressure, elevated liver enzymes, risks to the unborn baby if pregnant. Patient understands the risks and agrees with proceeding with Injectafer (or venofer).     CKD- chronic, stable, montior    Joint pains- chronic, stable, per ortho      Route Chart for Scheduling  Med Onc Route Chart for Scheduling      Therapy Plan Information  ZOLEDRONIC ACID (RECLAST) VISIT  Medications  zoledronic acid-mannitol & water 5 mg/100 mL infusion 5 mg  5 mg, Intravenous, Every visit  Flushes  sodium chloride 0.9% flush 10 mL  10 mL, Intravenous, Every visit  sodium chloride 0.9% 100 mL flush bag  Intravenous, Every visit    VENOFER (IRON SUCROSE) QW  Flushes  sodium  chloride 0.9% 250 mL flush bag  Intravenous, 1 time a week  sodium chloride 0.9% flush 10 mL  10 mL, Intravenous, 1 time a week  heparin, porcine (PF) 100 unit/mL injection flush 500 Units  500 Units, Intravenous, 1 time a week  alteplase injection 2 mg  2 mg, Intra-Catheter, 1 time a week  PRN Medications  EPINEPHrine (EPIPEN) 0.3 mg/0.3 mL pen injection 0.3 mg  0.3 mg, Intramuscular, PRN  diphenhydrAMINE injection 50 mg  50 mg, Intravenous, PRN  methylPREDNISolone sodium succinate injection 125 mg  125 mg, Intravenous, PRN  sodium chloride 0.9% bolus 1,000 mL 1,000 mL  1,000 mL, Intravenous, PRN        Electronically signed by Anh Martinez    [facility]      Future Appointments   Date Time Provider Department Center   8/21/2023  8:30 AM Vivien Choi, PT [facility] [place]   8/22/2023 10:30 AM [facility] [place]   8/24/2023  9:00 AM Pamela Medellin, PT [facility] [place]   8/28/2023  8:30 AM Armani Cai PA-C [facility] [place]   8/28/2023  9:15 AM Vivien Choi, PT [facility] [place]   8/30/2023  8:30 AM Vivien Choi, PT [facility] [place]   9/1/2023  8:30 AM Vivien Choi, PT [facility] [place]   9/1/2023  9:00 AM [facility] FRA CAR [facility]   9/1/2023 10:45 AM Mkcay Cosby NP [facility] ORTHO [place] [facility]   9/5/2023  9:15 AM Vlad Lopez III, PTA [facility] [place]   9/7/2023 10:00 AM Maryellen Valderrama MD [facility]   9/8/2023  8:30 AM Vivien Choi, PT [facility] [place]   9/11/2023  8:30 AM Armani Cai PA-C [facility] [place]   9/18/2023  8:30 AM Armani Cai PA-C [facility] [place]   10/24/2023 10:00 AM Marilin Trevino DPM [facility] [place] [facility]\""   3/5/2024  9:00 AM Michael Lal MD [facility] [id number] [facility]   6/17/2024  9:00 AM BAP DEXA1 [facility]     I spent >40 mins on reviewing epic chart notes, reviewing tests, nursing concerns,obtaining history, performing physical exam,  counseling and educating patient/family/caregiver, documentation, independently interpreting results and discussing them with patient/family/caregiver, care coordination, ordering medications/ tests/ procedures and referring and communicating with other health care professionals.           This note was created with voice recognition software.  Grammatical, syntax and spelling errors may be inevitable.

## 2023-08-21 ENCOUNTER — CLINICAL SUPPORT (OUTPATIENT)
Dept: REHABILITATION | Facility: OTHER | Age: 73
End: 2023-08-21
Payer: MEDICARE

## 2023-08-21 DIAGNOSIS — M25.60 DECREASED RANGE OF MOTION WITH DECREASED STRENGTH: ICD-10-CM

## 2023-08-21 DIAGNOSIS — Z74.09 IMPAIRED FUNCTIONAL MOBILITY, BALANCE, GAIT, AND ENDURANCE: Primary | ICD-10-CM

## 2023-08-21 DIAGNOSIS — R53.1 DECREASED RANGE OF MOTION WITH DECREASED STRENGTH: ICD-10-CM

## 2023-08-21 PROCEDURE — 97530 THERAPEUTIC ACTIVITIES: CPT | Mod: HCNC,PN

## 2023-08-21 PROCEDURE — 97110 THERAPEUTIC EXERCISES: CPT | Mod: HCNC,PN

## 2023-08-21 NOTE — PROGRESS NOTES
"[facility]   Physical Therapy Treatment Note      Name: Jessica Floyd  Clinic Number: [id number]    Therapy Diagnosis:   Encounter Diagnoses   Name Primary?    Impaired functional mobility, balance, gait, and endurance Yes    Decreased range of motion with decreased strength      Physician: Mckay Cosby NP    Visit Date: 8/21/2023    Physician Orders: PT Eval and Treat -- Weight bear as tolerated and ROMAT - caution with posterior hip activities, Work on core strength  Medical Diagnosis from Referral: S72.002A (ICD-10-CM) - Left displaced femoral neck fracture   Evaluation Date: 8/2/2023  Authorization Period Expiration: 10/01/2023  Plan of Care Expiration: 11/2/2023  Progress Note Due: 9/2/2023  Visit # / Visits authorized: 4/ 11  FOTO: 1/ 3        Time In: 0835 am  Time Out: 0915 am  Total Billable Time: 40 minutes        Precautions: Standard, 1997 brain aneurysm burst impacting L field of vision and temporary paralysis on L side     PTA Visit #: 0/5       Subjective     Pt reports: her shoulder and L knee are bothering her a lot today.  She was compliant with home exercise program.  Response to previous treatment: good response  Functional change: no change    Pain: 0/10; L knee pain 5/10  Location: Left Hip      Objective      Objective Measures updated at progress report unless specified.     Treatment     Jessica received the treatments listed in bold below:         therapeutic exercises to develop strength, endurance, ROM, flexibility, posture, and core stabilization for 25 minutes including:  + hooklying hip abduction w/ YTB x 20   hip ADD 20 x 5"    Straight leg raise: 2 x 10 with A   Quad sets: 5"x10   +updated and educated pt on HEP  HL 3-way clam x 10 x YTB   Sidelying hip abduction: attempted but UA 2* pain in R shoulder (RTC tear)  Long arc quads: 30x   B HR  30x  Hip abd stand 15x 2  B       manual therapy techniques: Joint mobilizations and Manual traction were " applied to the: L hip for  00 minutes, including:  Gentle PROM into flex L 20 sec x 3   Gentle inf glide np   PROM ER/ MR flex 90 gentle 20 sec x 3      therapeutic activities to improve functional performance for 15 minutes, including:  + Supine marches 2 x 10   Sit<>stands: elevated no UE support x 10   Bridges w/ ball squeeze 2 x 10       Patient Education and Home Exercises       Education provided:   - pt educated to continue HEP at home  - pt educated on current POC and benefit of therapy    Written Home Exercises Provided: Patient instructed to cont prior HEP. Exercises were reviewed and Jessica was able to demonstrate them prior to the end of the session.  Jessica demonstrated good  understanding of the education provided. See EMR under Patient Instructions for exercises provided during therapy sessions    Assessment     Patient reported mild discomfort in her L hip today but her L knee is limiting her a lot. Continued to require assistance with SLR due to pain in L knee. Pt tolerated mat level exercises working on glute strengthening, and ROM exercises. Added hip abduction in supine and supine marches to improve core activation and active hip ROM. Pt required occasional verbal cueing for proper muscle activation with completion of activity. Educated to continue working on HEP as tolerated to progress towards goals.     Jessica Is progressing well towards her goals.   Pt prognosis is Good.     Pt will continue to benefit from skilled outpatient physical therapy to address the deficits listed in the problem list box on initial evaluation, provide pt/family education and to maximize pt's level of independence in the home and community environment.     Pt's spiritual, cultural and educational needs considered and pt agreeable to plan of care and goals.     Anticipated barriers to physical therapy: L shoulder OA, focus     Goals: updated  Short Term Goals (6 Weeks):  1. Pt able to stand >=30 minutes with ADLs with  "<3/10 pain. Progressing, not met  2. Pt able to walk >=30 minutes with household chores with <3/10 pain. Progressing, not met  3. Pt able to ascend/descend curb, independently, 1 handrail, with <3/10 pain. Progressing, not met  4. Pt able to transfer in/out of chair 8x in 30 sec with 1 HHA to demonstrate improving functional strength Progressing, not met  5. Pt to improve TUG test to >/=25" to demonstrate improving motor control/coordination with transfers.  Progressing, not met  6. Pt to improve static balance with OLVERA balance test >/=46/56 for stability with ADLs.Progressing, not met     Long Term Goals (12 Weeks):  1. Pt able to stand >=45 minutes with ADLs with <3/10 pain. Progressing, not met  2. Pt able to walk >=45 minutes with household chores with <3/10 pain.  Progressing, not met  3. Pt able to ascend/descend 1 set of stairs, independently, 1 handrail, with <3/10 pain. Progressing, not met  4. Pt able to transfer in/out of chair 10x in 30 sec with 1 HHA to demonstrate improving functional strength Progressing, not met  5. Pt to improve TUG test to >/=15" to demonstrate improving motor control/coordination with transfers.  Progressing, not met  6. Pt to improve static balance with OLVERA balance test >/=46/56 for stability with ADLs. Progressing, not met  7. Pt to demonstrate improved dynamic balance with DGI >/=19/24 for safety with ambulation. Progressing, not met    Plan     Plan of care Certification: 8/2/2023 to 11/2/2023.     Continue current POC progressing towards PT goals set. Continue working on hip ROM and glute strengthening.     Vivien Choi, PT      "

## 2023-08-22 ENCOUNTER — OFFICE VISIT (OUTPATIENT)
Dept: RHEUMATOLOGY | Facility: CLINIC | Age: 73
End: 2023-08-22
Payer: MEDICARE

## 2023-08-22 VITALS
SYSTOLIC BLOOD PRESSURE: 145 MMHG | HEIGHT: 64 IN | WEIGHT: 124.63 LBS | HEART RATE: 78 BPM | DIASTOLIC BLOOD PRESSURE: 76 MMHG | BODY MASS INDEX: 21.28 KG/M2 | OXYGEN SATURATION: 95 %

## 2023-08-22 DIAGNOSIS — M15.9 PRIMARY OSTEOARTHRITIS INVOLVING MULTIPLE JOINTS: ICD-10-CM

## 2023-08-22 DIAGNOSIS — M1A.9XX0 CHRONIC GOUT WITHOUT TOPHUS, UNSPECIFIED CAUSE, UNSPECIFIED SITE: Primary | ICD-10-CM

## 2023-08-22 DIAGNOSIS — N18.31 STAGE 3A CHRONIC KIDNEY DISEASE: ICD-10-CM

## 2023-08-22 DIAGNOSIS — Z79.899 ENCOUNTER FOR LONG-TERM (CURRENT) USE OF OTHER MEDICATIONS: ICD-10-CM

## 2023-08-22 DIAGNOSIS — Z71.89 COUNSELING AND COORDINATION OF CARE: ICD-10-CM

## 2023-08-22 PROCEDURE — 3008F PR BODY MASS INDEX (BMI) DOCUMENTED: ICD-10-PCS | Mod: HCNC,CPTII,S$GLB, | Performed by: INTERNAL MEDICINE

## 2023-08-22 PROCEDURE — 3288F PR FALLS RISK ASSESSMENT DOCUMENTED: ICD-10-PCS | Mod: HCNC,CPTII,S$GLB, | Performed by: INTERNAL MEDICINE

## 2023-08-22 PROCEDURE — 3008F BODY MASS INDEX DOCD: CPT | Mod: HCNC,CPTII,S$GLB, | Performed by: INTERNAL MEDICINE

## 2023-08-22 PROCEDURE — 1125F AMNT PAIN NOTED PAIN PRSNT: CPT | Mod: HCNC,CPTII,S$GLB, | Performed by: INTERNAL MEDICINE

## 2023-08-22 PROCEDURE — 20605 INTERMEDIATE JOINT ASPIRATION/INJECTION: R INTERCARPAL: ICD-10-PCS | Mod: HCNC,RT,S$GLB, | Performed by: INTERNAL MEDICINE

## 2023-08-22 PROCEDURE — 99999 PR PBB SHADOW E&M-EST. PATIENT-LVL III: CPT | Mod: PBBFAC,HCNC,, | Performed by: INTERNAL MEDICINE

## 2023-08-22 PROCEDURE — 1159F PR MEDICATION LIST DOCUMENTED IN MEDICAL RECORD: ICD-10-PCS | Mod: HCNC,CPTII,S$GLB, | Performed by: INTERNAL MEDICINE

## 2023-08-22 PROCEDURE — 20605 DRAIN/INJ JOINT/BURSA W/O US: CPT | Mod: HCNC,RT,S$GLB, | Performed by: INTERNAL MEDICINE

## 2023-08-22 PROCEDURE — 1125F PR PAIN SEVERITY QUANTIFIED, PAIN PRESENT: ICD-10-PCS | Mod: HCNC,CPTII,S$GLB, | Performed by: INTERNAL MEDICINE

## 2023-08-22 PROCEDURE — 3044F PR MOST RECENT HEMOGLOBIN A1C LEVEL <7.0%: ICD-10-PCS | Mod: HCNC,CPTII,S$GLB, | Performed by: INTERNAL MEDICINE

## 2023-08-22 PROCEDURE — 99999 PR PBB SHADOW E&M-EST. PATIENT-LVL III: ICD-10-PCS | Mod: PBBFAC,HCNC,, | Performed by: INTERNAL MEDICINE

## 2023-08-22 PROCEDURE — 99214 PR OFFICE/OUTPT VISIT, EST, LEVL IV, 30-39 MIN: ICD-10-PCS | Mod: 25,HCNC,S$GLB, | Performed by: INTERNAL MEDICINE

## 2023-08-22 PROCEDURE — 1101F PT FALLS ASSESS-DOCD LE1/YR: CPT | Mod: HCNC,CPTII,S$GLB, | Performed by: INTERNAL MEDICINE

## 2023-08-22 PROCEDURE — 3288F FALL RISK ASSESSMENT DOCD: CPT | Mod: HCNC,CPTII,S$GLB, | Performed by: INTERNAL MEDICINE

## 2023-08-22 PROCEDURE — 3078F DIAST BP <80 MM HG: CPT | Mod: HCNC,CPTII,S$GLB, | Performed by: INTERNAL MEDICINE

## 2023-08-22 PROCEDURE — 3077F SYST BP >= 140 MM HG: CPT | Mod: HCNC,CPTII,S$GLB, | Performed by: INTERNAL MEDICINE

## 2023-08-22 PROCEDURE — 3078F PR MOST RECENT DIASTOLIC BLOOD PRESSURE < 80 MM HG: ICD-10-PCS | Mod: HCNC,CPTII,S$GLB, | Performed by: INTERNAL MEDICINE

## 2023-08-22 PROCEDURE — 3044F HG A1C LEVEL LT 7.0%: CPT | Mod: HCNC,CPTII,S$GLB, | Performed by: INTERNAL MEDICINE

## 2023-08-22 PROCEDURE — 1159F MED LIST DOCD IN RCRD: CPT | Mod: HCNC,CPTII,S$GLB, | Performed by: INTERNAL MEDICINE

## 2023-08-22 PROCEDURE — 3077F PR MOST RECENT SYSTOLIC BLOOD PRESSURE >= 140 MM HG: ICD-10-PCS | Mod: HCNC,CPTII,S$GLB, | Performed by: INTERNAL MEDICINE

## 2023-08-22 PROCEDURE — 99214 OFFICE O/P EST MOD 30 MIN: CPT | Mod: 25,HCNC,S$GLB, | Performed by: INTERNAL MEDICINE

## 2023-08-22 PROCEDURE — 1101F PR PT FALLS ASSESS DOC 0-1 FALLS W/OUT INJ PAST YR: ICD-10-PCS | Mod: HCNC,CPTII,S$GLB, | Performed by: INTERNAL MEDICINE

## 2023-08-22 RX ORDER — ALLOPURINOL 100 MG/1
200 TABLET ORAL DAILY
Qty: 180 TABLET | Refills: 3 | Status: SHIPPED | OUTPATIENT
Start: 2023-08-22

## 2023-08-22 RX ORDER — TRIAMCINOLONE ACETONIDE 40 MG/ML
40 INJECTION, SUSPENSION INTRA-ARTICULAR; INTRAMUSCULAR
Status: DISCONTINUED | OUTPATIENT
Start: 2023-08-22 | End: 2023-08-22 | Stop reason: HOSPADM

## 2023-08-22 RX ORDER — COLCHICINE 0.6 MG/1
1 CAPSULE ORAL
COMMUNITY
Start: 2023-07-31 | End: 2023-08-22 | Stop reason: SDUPTHER

## 2023-08-22 RX ORDER — LIDOCAINE HYDROCHLORIDE 10 MG/ML
1 INJECTION INFILTRATION; PERINEURAL
Status: DISCONTINUED | OUTPATIENT
Start: 2023-08-22 | End: 2023-08-22 | Stop reason: HOSPADM

## 2023-08-22 RX ORDER — COLCHICINE 0.6 MG/1
1 CAPSULE ORAL DAILY
Qty: 90 CAPSULE | Refills: 3 | Status: SHIPPED | OUTPATIENT
Start: 2023-08-22 | End: 2023-10-24

## 2023-08-22 RX ADMIN — TRIAMCINOLONE ACETONIDE 40 MG: 40 INJECTION, SUSPENSION INTRA-ARTICULAR; INTRAMUSCULAR at 10:08

## 2023-08-22 RX ADMIN — LIDOCAINE HYDROCHLORIDE 1 ML: 10 INJECTION INFILTRATION; PERINEURAL at 10:08

## 2023-08-22 NOTE — PROGRESS NOTES
RHEUMATOLOGY OUTPATIENT CLINIC NOTE    8/22/2023    Attending Rheumatologist: Juan Naranjo  Primary Care Provider: Effie Olmedo MD   Physician Requesting Consultation: No referring provider defined for this encounter.  Chief Complaint/Reason For Consultation:  Gout and Pain (Generalized pain)      Subjective:       HPI  Jessica Floyd is a 73 y.o. White female with medical history noted below who presents for evaluation of joint pain.   Patient presents for evaluation of joint pain. She notes chronic joint pain over many years with progression over the last year. She notes recent trauma to her left hand when trying to save her  from falling from a chair. She also notes paraesthesias of the right hand. She also notes her first episode of gout in her left toe. Unclear precipitant. No Hx of Stones. No FHX of Gout. She notes joint pain in her ankles, knees, back. She notes that activity precipitates the pain. Rest and Tylenol Helps. Occasional wrist swelling. Morning stiffness < 10 minutes. Has noted some wt loss since taking care of her 6yo grand daughter. Denies other systemic complaints.     Today  Patient here for follow up.   Last visit started on Gout management. Still has pain on right ulnar side, notes CSI helps, will provide today but will consider MRI in future. Also notes fall with Left hip replacement. NO gout flares, tolerating meds.    Review of Systems   Constitutional:  Negative for chills, fatigue, fever and unexpected weight change.   HENT:  Negative for mouth sores.    Eyes:  Negative for redness and eye dryness.   Respiratory:  Negative for cough and shortness of breath.    Cardiovascular:  Negative for chest pain.   Gastrointestinal:  Negative for abdominal distention, constipation, diarrhea, nausea and vomiting.   Genitourinary:  Negative for vaginal dryness.   Musculoskeletal:  Positive for arthralgias. Negative for back pain, gait problem, joint swelling, leg pain,  myalgias, neck pain, neck stiffness and joint deformity.   Integumentary:  Negative for rash.   Neurological:  Negative for weakness, numbness and headaches.   Hematological:  Negative for adenopathy. Does not bruise/bleed easily.   Psychiatric/Behavioral:  Negative for confusion, decreased concentration and sleep disturbance. The patient is not nervous/anxious.    All other systems reviewed and are negative.       Chronic comorbid conditions affecting medical decision making today:  Past Medical History:   Diagnosis Date    Allergic rhinitis     Amblyopia     Carotid stenosis     Coronary atherosclerosis     Outside [place] 2014: 20% mLAD, 50% mCx, 20%, mRCA    Dyslipidemia     ESBL (extended spectrum beta-lactamase) producing bacteria infection     UTI    Hypertension     Nausea and vomiting 2017    Subarachnoid hemorrhage due to ruptured aneurysm      Past Surgical History:   Procedure Laterality Date    ADENOIDECTOMY      ANTERIOR CRUCIATE LIGAMENT REPAIR      APPENDECTOMY      CATARACT EXTRACTION W/  INTRAOCULAR LENS IMPLANT Right 2022    Procedure: EXTRACTION, CATARACT, WITH IOL INSERTION;  Surgeon: Higinio Cristina MD;  Location: [facility];  Service: Ophthalmology;  Laterality: Right;    CATARACT EXTRACTION W/  INTRAOCULAR LENS IMPLANT Left 2022    Procedure: EXTRACTION, CATARACT, WITH IOL INSERTION;  Surgeon: Higinio Cristina MD;  Location: [facility];  Service: Ophthalmology;  Laterality: Left;    CEREBRAL ANEURYSM REPAIR      clip     SECTION      DENTAL SURGERY      EYE SURGERY Bilateral     cataract removal and lens implants    HIP ARTHROPLASTY Left 2023    Procedure: TOTAL HIP ARTHROPLASTY;  Surgeon: Juan Wan MD;  Location: [address];  Service: Orthopedics;  Laterality: Left;    TONSILLECTOMY       Family History   Problem Relation Age of Onset    Hypertension Mother     Aneurysm Mother     Hypertension Father     Alcohol abuse Father     Kidney  disease Brother     Heart disease Brother     Gout Brother     No Known Problems Daughter     No Known Problems Daughter     No Known Problems Daughter      Social History     Substance and Sexual Activity   Alcohol Use Yes    Alcohol/week: 7.0 standard drinks of alcohol    Types: 7 Glasses of wine per week     Social History     Tobacco Use   Smoking Status Former    Current packs/day: 0.00    Average packs/day: 0.5 packs/day for 20.0 years (10.0 ttl pk-yrs)    Types: Cigarettes    Start date: 1979    Quit date: 1999    Years since quittin.6    Passive exposure: Past   Smokeless Tobacco Never     Social History     Substance and Sexual Activity   Drug Use No       Current Outpatient Medications:     amLODIPine (NORVASC) 5 MG tablet, Take 1 tablet (5 mg total) by mouth once daily., Disp: 30 tablet, Rfl: 3    atorvastatin (LIPITOR) 80 MG tablet, TAKE 1 TABLET BY MOUTH EVERY DAY, Disp: 90 tablet, Rfl: 3    calcium-vitamin D3 (OS-WHITNEY 500 + D3) 500 mg-5 mcg (200 unit) per tablet, Take 2 tablets by mouth once daily., Disp: 15 tablet, Rfl: 0    carvediloL (COREG) 6.25 MG tablet, Take 1 tablet (6.25 mg total) by mouth 2 (two) times daily with meals., Disp: 60 tablet, Rfl: 11    clotrimazole (LOTRIMIN) 1 % Soln, Apply topically once daily. To affected toenails., Disp: 30 mL, Rfl: 6    fluticasone propionate (FLONASE) 50 mcg/actuation nasal spray, SPRAY ONCE INTO EACH NOSTRIL ONCE DAILY, Disp: 16 mL, Rfl: 3    hydroCHLOROthiazide (HYDRODIURIL) 25 MG tablet, Take 1 tablet (25 mg total) by mouth once daily., Disp: 90 tablet, Rfl: 3    melatonin (MELATIN) 3 mg tablet, Take 2 tablets (6 mg total) by mouth nightly as needed for Insomnia., Disp: , Rfl: 0    montelukast (SINGULAIR) 10 mg tablet, Take 1 tablet (10 mg total) by mouth every evening., Disp: 90 tablet, Rfl: 11    pantoprazole (PROTONIX) 40 MG tablet, TAKE 1 TABLET BY MOUTH EVERY DAY, Disp: 90 tablet, Rfl: 0    vitamin D (VITAMIN D3) 50 mcg (2,000 unit) Tab,  Take 1 tablet (2,000 Units total) by mouth once daily., Disp: , Rfl:     allopurinoL (ZYLOPRIM) 100 MG tablet, Take 2 tablets (200 mg total) by mouth once daily., Disp: 180 tablet, Rfl: 3    DEPO-MEDROL 40 mg/mL injection, , Disp: , Rfl:     MITIGARE 0.6 mg Cap, Take 1 capsule (0.6 mg total) by mouth once daily., Disp: 90 capsule, Rfl: 3    polyethylene glycol (GLYCOLAX) 17 gram PwPk, Take 17 g by mouth once daily. (Patient not taking: Reported on 8/18/2023), Disp: , Rfl: 0    pregabalin (LYRICA) 75 MG capsule, Take 1 capsule (75 mg total) by mouth every evening. (Patient not taking: Reported on 8/22/2023), Disp: 30 capsule, Rfl: 6    senna-docusate 8.6-50 mg (PERICOLACE) 8.6-50 mg per tablet, Take 1 tablet by mouth 2 (two) times daily., Disp: 60 tablet, Rfl: 3    VENOFER 100 mg iron/5 mL injection, , Disp: , Rfl:      Objective:         Vitals:    08/22/23 1053   BP: (!) 145/76   Pulse: 78     Physical Exam   Musculoskeletal:      Comments: Can make fist, no synovitis  Right 1st CMC TTP  [place] and malissa nodes  Knee crepitus  Negative ankle/MTP with hallux valgus right side  No tender points        Reviewed old and all outside pertinent medical records available.    All lab results personally reviewed and interpreted by me.  Lab Results   Component Value Date    WBC 9.54 08/16/2023    HGB 13.0 08/16/2023    HCT 41.3 08/16/2023     (H) 08/16/2023    MCH 32.5 (H) 08/16/2023    MCHC 31.5 (L) 08/16/2023    RDW 17.9 (H) 08/16/2023     08/16/2023    MPV 11.7 08/16/2023    PLTEST Appears normal 08/16/2023       Lab Results   Component Value Date     06/12/2023    K 3.7 06/12/2023     06/12/2023    CO2 26 06/12/2023    GLU 84 06/12/2023    BUN 29 (H) 06/12/2023    CALCIUM 9.8 06/12/2023    PROT 6.6 06/12/2023    ALBUMIN 3.3 (L) 06/12/2023    BILITOT 0.5 06/12/2023    AST 28 06/12/2023    ALKPHOS 73 06/12/2023    ALKPHOS 73 06/12/2023    ALT 20 06/12/2023       Lab Results   Component Value  "Date    COLORU Yellow 05/27/2023    APPEARANCEUA Clear 05/27/2023    SPECGRAV 1.010 05/27/2023    PHUR 6.0 05/27/2023    PROTEINUA Negative 05/27/2023    KETONESU Trace (A) 05/27/2023    LEUKOCYTESUR Negative 05/27/2023    NITRITE Negative 05/27/2023    UROBILINOGEN Negative 03/20/2017       Lab Results   Component Value Date    CRP 13.0 (H) 06/15/2022       Lab Results   Component Value Date    SEDRATE 46 (H) 06/15/2022       Lab Results   Component Value Date    RF <13.0 05/11/2022    SEDRATE 46 (H) 06/15/2022       No components found for: "25OHVITDTOT", "66UVNSYM5", "94RCUEFY1", "METHODNOTE"    Lab Results   Component Value Date    URICACID 5.5 05/25/2023       No components found for: "TSPOTTB"        Imaging:  All imaging reviewed and independently interpreted by me.         ASSESSMENT / PLAN:     Jessica Floyd is a 73 y.o. White female with:      1. Chronic gout without tophus, unspecified cause, unspecified site  - patient with recent Gout attack, +CKD3, no Stones or FHX, unknown triggers  - doing well   - continue Allopurinol 200mg and Colchicine PPX   - gout diet reinforced  - recent labs reviewed   - reassurance    2. Primary osteoarthritis involving multiple joints  - in addition to Gout she has OA  - chronic   - CSI to right Ulnar Carpal area, see procedure note, post procedure precautions discussed   - Tylenol PRN  - discussed alternative therapies such as Epson salts, Paraffin Wax and Topical Voltaren  - reassurance and exercise     3. Stage 3a chronic kidney disease  - noted  - renally dosed meds     4. Encounter for long-term (current) use of other medications  - recent labs reviewed     5. Other specified counseling  - over 10 minutes spent regarding below topics:  - Immunization counseling done.  - Weight loss counseling done.  - Nutrition and exercise counseling.  - Limitation of alcohol consumption.  - Regular exercise:  Aerobic and resistance.  - Medication counseling provided.      Follow up in " about 4 months (around 12/22/2023).    Method of contact with patient concerns: [facility]    Disclaimer:  This note is prepared using voice recognition software and as such is likely to have errors and has not been proof read. Please contact me for questions.     Time spent: 30 minutes in face to face discussion concerning diagnosis, prognosis, review of lab and test results, benefits of treatment as well as management of disease, counseling of patient and coordination of care between various health care providers.  Greater than half the time spent was used for coordination of care and counseling of patient.    Juan Naranjo M.D.  Rheumatology Department   [facility]

## 2023-08-22 NOTE — PROCEDURES
Intermediate Joint Aspiration/Injection: R intercarpal    Date/Time: 8/22/2023 10:30 AM    Performed by: Juan Naranjo MD  Authorized by: Juan Naranjo MD    Consent Done?:  Yes (Verbal)  Indications:  Arthritis and pain  Site marked: The procedure site was marked    Timeout: Prior to procedure the correct patient, procedure, and site was verified      Location:  Wrist  Site:  R intercarpal  Prep: Patient was prepped and draped in usual sterile fashion    Needle size:  25 G  Medications:  40 mg triamcinolone acetonide 40 mg/mL; 1 mL LIDOcaine HCL 10 mg/ml (1%) 10 mg/mL (1 %)  Patient tolerance:  Patient tolerated the procedure well with no immediate complications

## 2023-08-24 ENCOUNTER — CLINICAL SUPPORT (OUTPATIENT)
Dept: REHABILITATION | Facility: OTHER | Age: 73
End: 2023-08-24
Payer: MEDICARE

## 2023-08-24 ENCOUNTER — PATIENT MESSAGE (OUTPATIENT)
Dept: SPORTS MEDICINE | Facility: CLINIC | Age: 73
End: 2023-08-24
Payer: MEDICARE

## 2023-08-24 DIAGNOSIS — R53.1 DECREASED RANGE OF MOTION WITH DECREASED STRENGTH: ICD-10-CM

## 2023-08-24 DIAGNOSIS — M25.60 DECREASED RANGE OF MOTION WITH DECREASED STRENGTH: ICD-10-CM

## 2023-08-24 DIAGNOSIS — Z74.09 IMPAIRED FUNCTIONAL MOBILITY, BALANCE, GAIT, AND ENDURANCE: Primary | ICD-10-CM

## 2023-08-24 PROCEDURE — 97110 THERAPEUTIC EXERCISES: CPT | Mod: HCNC,PN | Performed by: PHYSICAL THERAPIST

## 2023-08-24 PROCEDURE — 97530 THERAPEUTIC ACTIVITIES: CPT | Mod: HCNC,PN | Performed by: PHYSICAL THERAPIST

## 2023-08-24 NOTE — PROGRESS NOTES
"[facility]   Physical Therapy Treatment Note      Name: Jessica Floyd  Clinic Number: [id number]    Therapy Diagnosis:   Encounter Diagnoses   Name Primary?    Impaired functional mobility, balance, gait, and endurance Yes    Decreased range of motion with decreased strength      Physician: Armani Cai PA-C    Visit Date: 8/24/2023    Physician Orders: PT Eval and Treat -- Weight bear as tolerated and ROMAT - caution with posterior hip activities, Work on core strength  Medical Diagnosis from Referral: S72.002A (ICD-10-CM) - Left displaced femoral neck fracture   Evaluation Date: 8/2/2023  Authorization Period Expiration: 10/01/2023  Plan of Care Expiration: 11/2/2023  Progress Note Due: 9/2/2023  Visit # / Visits authorized: 5/ 11  FOTO: 1/ 3        Time In: 0905  Time Out: 0945  Total Billable Time: 40 minutes        Precautions: Standard, 1997 brain aneurysm burst impacting L field of vision and temporary paralysis on L side     PTA Visit #: 0/5       Subjective     Pt reports: her hip feels great, but knee is hurting a lot. She's getting her Euflexxa injection on Monday.   She was compliant with home exercise program.  Response to previous treatment: good response  Functional change: no change    Pain: 0/10; L knee pain 5/10  Location: Left Hip      Objective      Objective Measures updated at progress report unless specified.     Treatment     Jessica received the treatments listed in bold below:         therapeutic exercises to develop strength, endurance, ROM, flexibility, posture, and core stabilization for 25 minutes including:  hooklying hip abduction w/ YTB x 20   hip ADD 20 x 5"    Straight leg raise: 2 x 10   Quad sets: 5"x15     HL 3-way clam 2 x 10 x RTB   Sidelying hip abduction: attempted but UA 2* pain in R shoulder (RTC tear)  Long arc quads: 30x   B HR  30x  Hip abd stand 15x 2  B       manual therapy techniques: Joint mobilizations and Manual traction were applied " to the: L hip for  00 minutes, including:  Gentle PROM into flex L 20 sec x 3   Gentle inf glide np   PROM ER/ MR flex 90 gentle 20 sec x 3      therapeutic activities to improve functional performance for 15 minutes, including:  Supine marches 2 x 10   Sit<>stands: elevated no UE support x 10   Bridges w/ ball squeeze 2 x 10       Patient Education and Home Exercises       Education provided:   - pt educated to continue HEP at home  - pt educated on current POC and benefit of therapy    Written Home Exercises Provided: Patient instructed to cont prior HEP. Exercises were reviewed and Jessica was able to demonstrate them prior to the end of the session.  Jessica demonstrated good  understanding of the education provided. See EMR under Patient Instructions for exercises provided during therapy sessions    Assessment     Pt able to perform SLR without assistance today. Manual block provided to prevent L knee valgus during sit to stand. Exercise tolerance is limited by L knee pain, scheduled for injection on Monday. She does demonstrate continued weakness to L LE with valgus positioning with STS and IR of hip with SLR. Added 3-way clam today to promote glut recruitment.     Jessica Is progressing well towards her goals.   Pt prognosis is Good.     Pt will continue to benefit from skilled outpatient physical therapy to address the deficits listed in the problem list box on initial evaluation, provide pt/family education and to maximize pt's level of independence in the home and community environment.     Pt's spiritual, cultural and educational needs considered and pt agreeable to plan of care and goals.     Anticipated barriers to physical therapy: L shoulder OA, focus     Goals: updated  Short Term Goals (6 Weeks):  1. Pt able to stand >=30 minutes with ADLs with <3/10 pain. Progressing, not met  2. Pt able to walk >=30 minutes with household chores with <3/10 pain. Progressing, not met  3. Pt able to ascend/descend  "curb, independently, 1 handrail, with <3/10 pain. Progressing, not met  4. Pt able to transfer in/out of chair 8x in 30 sec with 1 HHA to demonstrate improving functional strength Progressing, not met  5. Pt to improve TUG test to >/=25" to demonstrate improving motor control/coordination with transfers.  Progressing, not met  6. Pt to improve static balance with OLVERA balance test >/=46/56 for stability with ADLs.Progressing, not met     Long Term Goals (12 Weeks):  1. Pt able to stand >=45 minutes with ADLs with <3/10 pain. Progressing, not met  2. Pt able to walk >=45 minutes with household chores with <3/10 pain.  Progressing, not met  3. Pt able to ascend/descend 1 set of stairs, independently, 1 handrail, with <3/10 pain. Progressing, not met  4. Pt able to transfer in/out of chair 10x in 30 sec with 1 HHA to demonstrate improving functional strength Progressing, not met  5. Pt to improve TUG test to >/=15" to demonstrate improving motor control/coordination with transfers.  Progressing, not met  6. Pt to improve static balance with OLVERA balance test >/=46/56 for stability with ADLs. Progressing, not met  7. Pt to demonstrate improved dynamic balance with DGI >/=19/24 for safety with ambulation. Progressing, not met    Plan     Plan of care Certification: 8/2/2023 to 11/2/2023.     Continue current POC progressing towards PT goals set. Continue working on hip ROM and glute strengthening.     Pamela Medellin, PT      "

## 2023-08-25 ENCOUNTER — PATIENT MESSAGE (OUTPATIENT)
Dept: PRIMARY CARE CLINIC | Facility: CLINIC | Age: 73
End: 2023-08-25
Payer: MEDICARE

## 2023-08-25 DIAGNOSIS — M25.552 LEFT HIP PAIN: Primary | ICD-10-CM

## 2023-08-25 DIAGNOSIS — I10 ESSENTIAL HYPERTENSION: ICD-10-CM

## 2023-08-25 DIAGNOSIS — G89.29 CHRONIC RIGHT SHOULDER PAIN: ICD-10-CM

## 2023-08-25 DIAGNOSIS — M25.511 CHRONIC RIGHT SHOULDER PAIN: ICD-10-CM

## 2023-08-25 RX ORDER — AMLODIPINE BESYLATE 5 MG/1
5 TABLET ORAL DAILY
Qty: 30 TABLET | Refills: 3 | Status: SHIPPED | OUTPATIENT
Start: 2023-08-25 | End: 2023-10-10

## 2023-08-25 NOTE — TELEPHONE ENCOUNTER
Care Due:                  Date            Visit Type   Department     Provider  --------------------------------------------------------------------------------                                EP -                              PRIMARY      [facility]  Last Visit: 03-      CARE ([facility])   FRANK Olmedo  Next Visit: None Scheduled  None         None Found                                                            Last  Test          Frequency    Reason                     Performed    Due Date  --------------------------------------------------------------------------------    Lipid Panel.  12 months..  atorvastatin.............  04- 03-    [facility] Embedded Care Due Messages. Reference number: [id number].   8/25/2023 11:32:35 AM CDT

## 2023-08-25 NOTE — TELEPHONE ENCOUNTER
Refill Encounter    PCP Visits: Recent Visits  Date Type Provider Dept   03/28/23 Office Visit Effie Olmedo MD [facility]   03/14/23 Office Visit Effie Olmedo MD [facility]   Showing recent visits within past 360 days and meeting all other requirements  Future Appointments  No visits were found meeting these conditions.  Showing future appointments within next 720 days and meeting all other requirements     Last 3 Blood Pressure:   BP Readings from Last 3 Encounters:   08/22/23 (!) 145/76   08/18/23 128/71   08/14/23 (!) 148/80     Preferred Pharmacy:   [facility]/pharmacy #[id number] - [place], [place] - [address]  [address] [address]  Phone: [phone] Fax: [phone]    [facility] - [place], [place] - [address]  [address] [address]  Phone: [phone] Fax: [phone]    Requested RX:  Requested Prescriptions      No prescriptions requested or ordered in this encounter      RX Route: Normal

## 2023-08-28 ENCOUNTER — OFFICE VISIT (OUTPATIENT)
Dept: SPORTS MEDICINE | Facility: CLINIC | Age: 73
End: 2023-08-28
Payer: MEDICARE

## 2023-08-28 ENCOUNTER — PATIENT MESSAGE (OUTPATIENT)
Dept: REHABILITATION | Facility: OTHER | Age: 73
End: 2023-08-28
Payer: MEDICARE

## 2023-08-28 VITALS
BODY MASS INDEX: 21.97 KG/M2 | SYSTOLIC BLOOD PRESSURE: 110 MMHG | HEIGHT: 63 IN | WEIGHT: 124 LBS | DIASTOLIC BLOOD PRESSURE: 70 MMHG

## 2023-08-28 DIAGNOSIS — M17.12 PRIMARY OSTEOARTHRITIS OF LEFT KNEE: Primary | ICD-10-CM

## 2023-08-28 PROCEDURE — 99999 PR PBB SHADOW E&M-EST. PATIENT-LVL III: ICD-10-PCS | Mod: PBBFAC,HCNC,, | Performed by: PHYSICIAN ASSISTANT

## 2023-08-28 PROCEDURE — 99499 NO LOS: ICD-10-PCS | Mod: HCNC,S$GLB,, | Performed by: PHYSICIAN ASSISTANT

## 2023-08-28 PROCEDURE — 99999 PR PBB SHADOW E&M-EST. PATIENT-LVL III: CPT | Mod: PBBFAC,HCNC,, | Performed by: PHYSICIAN ASSISTANT

## 2023-08-28 PROCEDURE — 20610 DRAIN/INJ JOINT/BURSA W/O US: CPT | Mod: HCNC,LT,S$GLB, | Performed by: PHYSICIAN ASSISTANT

## 2023-08-28 PROCEDURE — 99499 UNLISTED E&M SERVICE: CPT | Mod: HCNC,S$GLB,, | Performed by: PHYSICIAN ASSISTANT

## 2023-08-28 PROCEDURE — 20610 LARGE JOINT ASPIRATION/INJECTION: L KNEE: ICD-10-PCS | Mod: HCNC,LT,S$GLB, | Performed by: PHYSICIAN ASSISTANT

## 2023-08-28 NOTE — PROGRESS NOTES
ESTABLISHED PATIENT    History 8/28/2023:  Jessica is here for her left knee Orthovisc #1 of 3 injection.    History 8/14/2023:  Jessica returns here today for follow-up evaluation of her left knee.  She complains today of having recurrent pain over the last 2 weeks.  The previous Orthovisc series gave her significant relief and lasted approximately 8 months.    Of note, she is scheduled to see Dr. Emmanuel next month for her right shoulder and to discuss reverse shoulder replacement.    History 1/23/2023:  Jessica was seen for right shoulder pain.  She states her left knee pain has resolved since receiving the Orthovisc series.    History 12/5/2022:  Jessica returns here today for her 3rd left knee Orthovisc injection.    History 11/28/2022:  Jessica returns here today for her 2nd left knee Orthovisc injection.    History 11/14/2022:  Jessica returns here today for her 1st left knee Orthovisc injection.    History 10/31/2022:  73 y.o. Female with a history of left knee pain who reports having chronic patellar instability with 3 dislocations requiring manual reduction beginning when she was 11 years old.  She has had 2 arthroscopic procedures which were clean out procedures and not reconstructive.  She was previously seen by another provider and given that Euflexxa injections approximately 4 years ago.  She states these injections give her temporary relief.  She recently completed an extended course of physical therapy which also gave her relief.  Her biggest complaint today is anterior knee pain when sitting for any length of time.  She denies having any mechanical catching or popping sensations.  She has had no recent subluxation or instability events.        - mechanical symptoms, - instability    Is affecting ADLs.  Pain is 10/10 at it's worst.        PAST MEDICAL HISTORY    Past Medical History:   Diagnosis Date    Allergic rhinitis     Amblyopia     Carotid stenosis     Coronary atherosclerosis     Outside [facility]  2014: 20% mLAD, 50% mCx, 20%, mRCA    Dyslipidemia     ESBL (extended spectrum beta-lactamase) producing bacteria infection     UTI    Hypertension     Nausea and vomiting 2017    Subarachnoid hemorrhage due to ruptured aneurysm        PAST SURGICAL HISTORY     Past Surgical History:   Procedure Laterality Date    ADENOIDECTOMY      ANTERIOR CRUCIATE LIGAMENT REPAIR      APPENDECTOMY      CATARACT EXTRACTION W/  INTRAOCULAR LENS IMPLANT Right 2022    Procedure: EXTRACTION, CATARACT, WITH IOL INSERTION;  Surgeon: Higinio Cristina MD;  Location: [facility];  Service: Ophthalmology;  Laterality: Right;    CATARACT EXTRACTION W/  INTRAOCULAR LENS IMPLANT Left 2022    Procedure: EXTRACTION, CATARACT, WITH IOL INSERTION;  Surgeon: Higinio Cristina MD;  Location: [facility] OR;  Service: Ophthalmology;  Laterality: Left;    CEREBRAL ANEURYSM REPAIR      clip     SECTION      DENTAL SURGERY      EYE SURGERY Bilateral     cataract removal and lens implants    HIP ARTHROPLASTY Left 2023    Procedure: TOTAL HIP ARTHROPLASTY;  Surgeon: Juan Wan MD;  Location: [address];  Service: Orthopedics;  Laterality: Left;    TONSILLECTOMY         FAMILY HISTORY    Family History   Problem Relation Age of Onset    Hypertension Mother     Aneurysm Mother     Hypertension Father     Alcohol abuse Father     Kidney disease Brother     Heart disease Brother     Gout Brother     No Known Problems Daughter     No Known Problems Daughter     No Known Problems Daughter        SOCIAL HISTORY    Social History     Socioeconomic History    Marital status:    Occupational History    Occupation: Retired   Tobacco Use    Smoking status: Former     Current packs/day: 0.00     Average packs/day: 0.5 packs/day for 20.0 years (10.0 ttl pk-yrs)     Types: Cigarettes     Start date: 1979     Quit date: 1999     Years since quittin.6     Passive exposure: Past    Smokeless tobacco: Never    Substance and Sexual Activity    Alcohol use: Yes     Alcohol/week: 7.0 standard drinks of alcohol     Types: 7 Glasses of wine per week    Drug use: No    Sexual activity: Yes     Partners: Male   Social History Narrative    .  Has 3 grown daughters.   lives in [place] [place].       Social Determinants of Health     Financial Resource Strain: Low Risk  (6/9/2023)    Overall Financial Resource Strain (CARDIA)     Difficulty of Paying Living Expenses: Not very hard   Food Insecurity: No Food Insecurity (6/9/2023)    Hunger Vital Sign     Worried About Running Out of Food in the Last Year: Never true     Ran Out of Food in the Last Year: Never true   Transportation Needs: No Transportation Needs (6/9/2023)    PRAPARE - Transportation     Lack of Transportation (Medical): No     Lack of Transportation (Non-Medical): No   Physical Activity: Sufficiently Active (6/9/2023)    Exercise Vital Sign     Days of Exercise per Week: 7 days     Minutes of Exercise per Session: 60 min   Recent Concern: Physical Activity - Insufficiently Active (4/12/2023)    Exercise Vital Sign     Days of Exercise per Week: 3 days     Minutes of Exercise per Session: 30 min   Stress: No Stress Concern Present (6/9/2023)    [employer] [place] [employer] - Occupational Stress Questionnaire     Feeling of Stress : Only a little   Social Connections: Socially Integrated (6/9/2023)    Social Connection and Isolation Panel [NHANES]     Frequency of Communication with Friends and Family: More than three times a week     Frequency of Social Gatherings with Friends and Family: More than three times a week     Attends Synagogue Services: 1 to 4 times per year     Active Member of Clubs or Organizations: Yes     Attends Club or Organization Meetings: More than 4 times per year     Marital Status:    Housing Stability: Low Risk  (6/9/2023)    Housing Stability Vital Sign     Unable to Pay for Housing in the Last Year: No      Number of Places Lived in the Last Year: 1     Unstable Housing in the Last Year: No       MEDICATIONS      Current Outpatient Medications:     allopurinoL (ZYLOPRIM) 100 MG tablet, Take 2 tablets (200 mg total) by mouth once daily., Disp: 180 tablet, Rfl: 3    amLODIPine (NORVASC) 5 MG tablet, Take 1 tablet (5 mg total) by mouth once daily., Disp: 30 tablet, Rfl: 3    atorvastatin (LIPITOR) 80 MG tablet, TAKE 1 TABLET BY MOUTH EVERY DAY, Disp: 90 tablet, Rfl: 3    calcium-vitamin D3 (OS-WHITNEY 500 + D3) 500 mg-5 mcg (200 unit) per tablet, Take 2 tablets by mouth once daily., Disp: 15 tablet, Rfl: 0    carvediloL (COREG) 6.25 MG tablet, Take 1 tablet (6.25 mg total) by mouth 2 (two) times daily with meals., Disp: 60 tablet, Rfl: 11    clotrimazole (LOTRIMIN) 1 % Soln, Apply topically once daily. To affected toenails., Disp: 30 mL, Rfl: 6    DEPO-MEDROL 40 mg/mL injection, , Disp: , Rfl:     fluticasone propionate (FLONASE) 50 mcg/actuation nasal spray, SPRAY ONCE INTO EACH NOSTRIL ONCE DAILY, Disp: 16 mL, Rfl: 3    hydroCHLOROthiazide (HYDRODIURIL) 25 MG tablet, Take 1 tablet (25 mg total) by mouth once daily., Disp: 90 tablet, Rfl: 3    melatonin (MELATIN) 3 mg tablet, Take 2 tablets (6 mg total) by mouth nightly as needed for Insomnia., Disp: , Rfl: 0    MITIGARE 0.6 mg Cap, Take 1 capsule (0.6 mg total) by mouth once daily., Disp: 90 capsule, Rfl: 3    montelukast (SINGULAIR) 10 mg tablet, Take 1 tablet (10 mg total) by mouth every evening., Disp: 90 tablet, Rfl: 11    pantoprazole (PROTONIX) 40 MG tablet, TAKE 1 TABLET BY MOUTH EVERY DAY, Disp: 90 tablet, Rfl: 0    pregabalin (LYRICA) 75 MG capsule, Take 1 capsule (75 mg total) by mouth every evening., Disp: 30 capsule, Rfl: 6    vitamin D (VITAMIN D3) 50 mcg (2,000 unit) Tab, Take 1 tablet (2,000 Units total) by mouth once daily., Disp: , Rfl:     polyethylene glycol (GLYCOLAX) 17 gram PwPk, Take 17 g by mouth once daily. (Patient not taking: Reported on  "8/18/2023), Disp: , Rfl: 0    senna-docusate 8.6-50 mg (PERICOLACE) 8.6-50 mg per tablet, Take 1 tablet by mouth 2 (two) times daily., Disp: 60 tablet, Rfl: 3    VENOFER 100 mg iron/5 mL injection, , Disp: , Rfl:     ALLERGIES     Review of patient's allergies indicates:  No Known Allergies      REVIEW OF SYSTEMS   Constitution: Negative. Negative for chills, fever and night sweats.   HENT: Negative for congestion and headaches.    Eyes: Negative for blurred vision, left vision loss and right vision loss.   Cardiovascular: Negative for chest pain and syncope.   Respiratory: Negative for cough and shortness of breath.    Endocrine: Negative for polydipsia, polyphagia and polyuria.   Hematologic/Lymphatic: Negative for bleeding problem. Does not bruise/bleed easily.   Skin: Negative for dry skin, itching and rash.   Musculoskeletal: Negative for falls. Positive for left knee pain.  Gastrointestinal: Negative for abdominal pain and bowel incontinence.   Genitourinary: Negative for bladder incontinence and nocturia.   Neurological: Negative for disturbances in coordination, loss of balance and seizures.   Psychiatric/Behavioral: Negative for depression. The patient does not have insomnia.    Allergic/Immunologic: Negative for hives and persistent infections.     PHYSICAL EXAMINATION    Vitals: /70 (BP Location: Right arm, Patient Position: Sitting, BP Method: Small (Manual))   Ht 5' 3" (1.6 m)   Wt 56.2 kg (124 lb)   BMI 21.97 kg/m²     General: The patient appears active and healthy with no apparent physical problems.  No disturbance of mood or affect is demonstrated. Alert and Oriented.    HEENT: Eyes normal, pupils equally round, nose normal.    Resp: Equal and symmetrical chest rises. No wheezing    CV: Regular rate    Neck: Supple; nonpainful range of motion.    Extremities: no cyanosis, clubbing, edema, or diffuse swelling.  Palpable pulses, good capillary refill of the digits.  No coolness, " discoloration, edema or obvious varicosities.    Skin: no lesions noted.    Lymphatic: no detected adenopathy in the upper or lower extremities.    Neurologic: normal mental status, normal reflexes, normal gait and balance.  Patient is alert and oriented to person, place and time.  No flaccidity or spasticity is noted.  No motor or sensory deficits are noted.  Light touch is intact    Orthopaedic: Knee Musculoskeletal Exam  Gait    Antalgic: left    Assistive device: cane    Inspection    Left      Left knee inspection is normal.       Erythema: none        Effusion: none        Edema: none        Ecchymosis: none        Deformity: none        Alignment: normal        Previous incision: no previous incision    Palpation    Left      Increased warmth: none        Masses: none        Crepitus: patellofemoral        Tenderness: present          Lateral joint line: moderate          Lateral retinaculum: mild          LCL: mild          Medial joint line: moderate          Medial retinaculum: mild          Patella: mild          Patellar tendon: mild      Range of Motion    Left      Left knee range of motion is normal and full.      Strength    Left      Left knee strength is normal.       Extension: 5/5. Extension is not affected by pain.       Flexion: 5/5. Flexion is not affected by pain.      Instability    Left      Instability signs: none - stable      Varus stress grade: normal      Valgus stress grade: normal      Anterior drawer: normal      Posterior drawer: normal      Medial Carlton test: negative      Lateral Carlton test: negative      Lachman: negative    Neurovascular    Left      Left knee neurovascular exam is normal.        Pulses - DP: normal      Pulses - PT: normal    Special Signs    Left      Left knee special signs are normal.      Straight leg raise: normal      J sign: none        Patellar compression: moderate        Patellar apprehension: moderate        Patellar glide medial: 1       Patellar glide lateral: 1        IMAGING    X-Ray Knee 1 or 2 View Left  Order: [id number]  Status: Final result     Visible to patient: Yes (not seen)     Next appt: 08/16/2023 at 11:00 AM in Lab (LAB, [facility])     Dx: Leg pain     0 Result Notes  Details    [place] Physician [place] Date Result Priority   Rich Anderson MD  [phone]  [phone] 5/26/2023 STAT     Narrative & Impression  EXAMINATION:  XR KNEE 1 OR 2 VIEW LEFT     CLINICAL HISTORY:  fall ;  Pain in leg, unspecified     TECHNIQUE:  Two views of the left knee     COMPARISON:  10/31/2022     FINDINGS:  No radiographic evidence of acute displaced fracture or dislocation of the left knee.  There is mild narrowing of the medial and lateral tibiofemoral compartments.  No significant suprapatellar joint effusion appreciated.  There are prominent vascular calcifications noted.     Impression:     No radiographic evidence of acute displaced fracture or dislocation of the left knee.        Electronically signed by: Rich Anderson MD  Date:                                            05/26/2023  Time:                                           22:53           Exam Ended: 05/26/23 22:42 Last Resulted: 05/26/23 22:53               X-rays including four views of the left knee were completed on 5/26/23. The images and report were reviewed by me personally.  There are no acute findings.  No fracture or dislocation.  No significant joint space narrowing or periarticular osteophyte formation.  There is a small area of calcification adjacent to the lateral femoral condyle.    IMPRESSION       ICD-10-CM ICD-9-CM   1. Primary osteoarthritis of left knee  M17.12 715.16       73-year-old female with recurrent left knee pain following patellar dislocation x 3.  She has had no recent instability events.  Her history and physical examination are consistent with patellofemoral osteoarthritis.  She has done well with injections in the past and would like to  repeat the Orthovisc series.    MEDICATIONS PRESCRIBED      None    RECOMMENDATIONS     Surgical and non-surgical treatment options for left knee osteoarthritis discussed  Left knee Orthovisc #1 of 3 injection given today  RTC in 1 week for Orthovisc #2 injection      Large Joint Aspiration/Injection: L knee    Date/Time: 8/28/2023 8:30 AM    Performed by: Armani Cai PA-C  Authorized by: Armani Cai PA-C    Consent Done?:  Yes (Verbal)  Indications:  Arthritis  Site marked: the procedure site was marked    Timeout: prior to procedure the correct patient, procedure, and site was verified    Prep: patient was prepped and draped in usual sterile fashion      Local anesthesia used?: Yes    Local anesthetic:  Co-phenylcaine spray    Details:  Needle Size:  22 G  Ultrasonic Guidance for needle placement?: No    Approach:  Anterolateral  Location:  Knee  Site:  L knee  Medications:  30 mg sodium hyaluronate (orthovisc) 30 mg/2 mL  Patient tolerance:  Patient tolerated the procedure well with no immediate complications        All questions were answered, pt will contact us for questions or concerns in the interim.

## 2023-08-30 ENCOUNTER — CLINICAL SUPPORT (OUTPATIENT)
Dept: REHABILITATION | Facility: OTHER | Age: 73
End: 2023-08-30
Payer: MEDICARE

## 2023-08-30 ENCOUNTER — TELEPHONE (OUTPATIENT)
Dept: ORTHOPEDICS | Facility: CLINIC | Age: 73
End: 2023-08-30
Payer: MEDICARE

## 2023-08-30 DIAGNOSIS — Z74.09 IMPAIRED FUNCTIONAL MOBILITY, BALANCE, GAIT, AND ENDURANCE: Primary | ICD-10-CM

## 2023-08-30 DIAGNOSIS — M25.60 DECREASED RANGE OF MOTION WITH DECREASED STRENGTH: ICD-10-CM

## 2023-08-30 DIAGNOSIS — R53.1 DECREASED RANGE OF MOTION WITH DECREASED STRENGTH: ICD-10-CM

## 2023-08-30 PROCEDURE — 97530 THERAPEUTIC ACTIVITIES: CPT | Mod: HCNC,PN

## 2023-08-30 PROCEDURE — 97110 THERAPEUTIC EXERCISES: CPT | Mod: HCNC,PN

## 2023-08-30 NOTE — PROGRESS NOTES
"[facility]   Physical Therapy Treatment Note      Name: Jessica Floyd  Clinic Number: [id number]    Therapy Diagnosis:   Encounter Diagnoses   Name Primary?    Impaired functional mobility, balance, gait, and endurance Yes    Decreased range of motion with decreased strength        Physician: Armani Cai PA-C    Visit Date: 8/30/2023    Physician Orders: PT Eval and Treat -- Weight bear as tolerated and ROMAT - caution with posterior hip activities, Work on core strength  Medical Diagnosis from Referral: S72.002A (ICD-10-CM) - Left displaced femoral neck fracture   Evaluation Date: 8/2/2023  Authorization Period Expiration: 10/01/2023  Plan of Care Expiration: 11/2/2023  Progress Note Due: 9/2/2023  Visit # / Visits authorized: 6/ 11  FOTO: 1/ 3        Time In: 8:40 am (late arrival)  Time Out: 9:15  Total Billable Time: 35 minutes        Precautions: Standard, 1997 brain aneurysm burst impacting L field of vision and temporary paralysis on L side     PTA Visit #: 0/5       Subjective     Pt reports: her knee and hip feel good today. She received the knee injection Monday and it has helped significantly.   She was compliant with home exercise program.  Response to previous treatment: good response  Functional change: improved ambulation due to knee injection     Pain: 0/10  Location: Left Hip      Objective      Objective Measures updated at progress report unless specified.     Treatment     Jessica received the treatments listed in bold below:         therapeutic exercises to develop strength, endurance, ROM, flexibility, posture, and core stabilization for 20 minutes including:  hip ADD 20 x 5"    Straight leg raise: 2 x 10   Quad sets: 5"x15   hooklying hip abduction w/ YTB x 20   + standing hip 3 way UE support on mat x 20 each direction   + supine hip abduction 1# 2 x 10   HL 3-way clam 2 x 10 x RTB     Sidelying hip abduction: attempted but UA 2* pain in R shoulder (RTC " tear)  Long arc quads: 30x   B HR  30x  Hip abd stand 15x 2  B       manual therapy techniques: Joint mobilizations and Manual traction were applied to the: L hip for  00 minutes, including:  Gentle PROM into flex L 20 sec x 3   Gentle inf glide np   PROM ER/ MR flex 90 gentle 20 sec x 3      therapeutic activities to improve functional performance for 15 minutes, including:  Supine marches 2 x 10 1# ankle weight   Sit<>stands: elevated no UE support x 10   + STS regular height, unilateral UE support x 15  Bridges w/ ball squeeze 2 x 10       Patient Education and Home Exercises       Education provided:   - pt educated to continue HEP at home  - pt educated on current POC and benefit of therapy    Written Home Exercises Provided: Patient instructed to cont prior HEP. Exercises were reviewed and Jessica was able to demonstrate them prior to the end of the session.  Jessica demonstrated good  understanding of the education provided. See EMR under Patient Instructions for exercises provided during therapy sessions    Assessment     Patient able to tolerate standing hip exercises today due to knee injection she received on Monday. Continued working on sit<>stands from regular chair height with one hand support and from elevated height with no hand support. Added standing hip 3-way with cueing to maintain upright posture. Progressed with 1# ankle weight for supine hip abduction and continued with straight leg raises with no support needed. Good tolerance demonstrated throughout visit and no increase in pain noted. Heat applied to R shoulder during exercises as pt voiced significant discomfort today. Complete progress note next visit and reassess objectives/goals.     Jessica Is progressing well towards her goals.   Pt prognosis is Good.     Pt will continue to benefit from skilled outpatient physical therapy to address the deficits listed in the problem list box on initial evaluation, provide pt/family education and to  "maximize pt's level of independence in the home and community environment.     Pt's spiritual, cultural and educational needs considered and pt agreeable to plan of care and goals.     Anticipated barriers to physical therapy: L shoulder OA, focus     Goals: updated 8/30/23  Short Term Goals (6 Weeks):  1. Pt able to stand >=30 minutes with ADLs with <3/10 pain. Progressing, not met  2. Pt able to walk >=30 minutes with household chores with <3/10 pain. Progressing, not met  3. Pt able to ascend/descend curb, independently, 1 handrail, with <3/10 pain. Progressing, not met  4. Pt able to transfer in/out of chair 8x in 30 sec with 1 HHA to demonstrate improving functional strength Progressing, not met  5. Pt to improve TUG test to >/=25" to demonstrate improving motor control/coordination with transfers.  Progressing, not met  6. Pt to improve static balance with OLVERA balance test >/=46/56 for stability with ADLs.Progressing, not met     Long Term Goals (12 Weeks):  1. Pt able to stand >=45 minutes with ADLs with <3/10 pain. Progressing, not met  2. Pt able to walk >=45 minutes with household chores with <3/10 pain.  Progressing, not met  3. Pt able to ascend/descend 1 set of stairs, independently, 1 handrail, with <3/10 pain. Progressing, not met  4. Pt able to transfer in/out of chair 10x in 30 sec with 1 HHA to demonstrate improving functional strength Progressing, not met  5. Pt to improve TUG test to >/=15" to demonstrate improving motor control/coordination with transfers.  Progressing, not met  6. Pt to improve static balance with OLVERA balance test >/=46/56 for stability with ADLs. Progressing, not met  7. Pt to demonstrate improved dynamic balance with DGI >/=19/24 for safety with ambulation. Progressing, not met    Plan     Plan of care Certification: 8/2/2023 to 11/2/2023.     Continue current POC progressing towards PT goals set. Continue with standing hip exercises as tolerated. Update goals and perform " reassessment next visit.     Vivien Choi, PT

## 2023-08-30 NOTE — TELEPHONE ENCOUNTER
Attempted to reach pt. to confirm appt location & time c Dr. Ham 09/07/23 with XR prior. My O message sent.

## 2023-09-01 ENCOUNTER — OFFICE VISIT (OUTPATIENT)
Dept: ORTHOPEDICS | Facility: CLINIC | Age: 73
End: 2023-09-01
Payer: MEDICARE

## 2023-09-01 ENCOUNTER — HOSPITAL ENCOUNTER (OUTPATIENT)
Dept: RADIOLOGY | Facility: HOSPITAL | Age: 73
Discharge: HOME | End: 2023-09-01
Attending: NURSE PRACTITIONER
Payer: MEDICARE

## 2023-09-01 VITALS — HEIGHT: 63 IN | WEIGHT: 123.88 LBS | BODY MASS INDEX: 21.95 KG/M2

## 2023-09-01 DIAGNOSIS — M25.552 LEFT HIP PAIN: ICD-10-CM

## 2023-09-01 DIAGNOSIS — M81.0 OSTEOPOROSIS, UNSPECIFIED OSTEOPOROSIS TYPE, UNSPECIFIED PATHOLOGICAL FRACTURE PRESENCE: ICD-10-CM

## 2023-09-01 DIAGNOSIS — M81.6 LOCALIZED OSTEOPOROSIS OF LEQUESNE: ICD-10-CM

## 2023-09-01 DIAGNOSIS — S72.002A LEFT DISPLACED FEMORAL NECK FRACTURE: Primary | ICD-10-CM

## 2023-09-01 DIAGNOSIS — M25.552 LEFT HIP PAIN: Primary | ICD-10-CM

## 2023-09-01 DIAGNOSIS — M80.80XA PATHOLOGICAL FRACTURE DUE TO OSTEOPOROSIS, UNSPECIFIED FRACTURE SITE, UNSPECIFIED OSTEOPOROSIS TYPE, INITIAL ENCOUNTER: Primary | ICD-10-CM

## 2023-09-01 DIAGNOSIS — Z98.890 POST-OPERATIVE STATE: ICD-10-CM

## 2023-09-01 PROCEDURE — 1159F MED LIST DOCD IN RCRD: CPT | Mod: HCNC,CPTII,S$GLB, | Performed by: NURSE PRACTITIONER

## 2023-09-01 PROCEDURE — 99213 OFFICE O/P EST LOW 20 MIN: CPT | Mod: HCNC,S$GLB,, | Performed by: PHYSICIAN ASSISTANT

## 2023-09-01 PROCEDURE — 1160F RVW MEDS BY RX/DR IN RCRD: CPT | Mod: HCNC,CPTII,S$GLB, | Performed by: PHYSICIAN ASSISTANT

## 2023-09-01 PROCEDURE — 1159F PR MEDICATION LIST DOCUMENTED IN MEDICAL RECORD: ICD-10-PCS | Mod: HCNC,CPTII,S$GLB, | Performed by: NURSE PRACTITIONER

## 2023-09-01 PROCEDURE — 3288F FALL RISK ASSESSMENT DOCD: CPT | Mod: HCNC,CPTII,S$GLB, | Performed by: NURSE PRACTITIONER

## 2023-09-01 PROCEDURE — 99999 PR PBB SHADOW E&M-EST. PATIENT-LVL III: ICD-10-PCS | Mod: PBBFAC,HCNC,, | Performed by: PHYSICIAN ASSISTANT

## 2023-09-01 PROCEDURE — 3044F PR MOST RECENT HEMOGLOBIN A1C LEVEL <7.0%: ICD-10-PCS | Mod: HCNC,CPTII,S$GLB, | Performed by: PHYSICIAN ASSISTANT

## 2023-09-01 PROCEDURE — 73502 XR HIP WITH PELVIS WHEN PERFORMED, 2 OR 3 VIEWS LEFT: ICD-10-PCS | Mod: 26,HCNC,LT, | Performed by: RADIOLOGY

## 2023-09-01 PROCEDURE — 3008F BODY MASS INDEX DOCD: CPT | Mod: HCNC,CPTII,S$GLB, | Performed by: NURSE PRACTITIONER

## 2023-09-01 PROCEDURE — 3008F PR BODY MASS INDEX (BMI) DOCUMENTED: ICD-10-PCS | Mod: HCNC,CPTII,S$GLB, | Performed by: NURSE PRACTITIONER

## 2023-09-01 PROCEDURE — 1101F PT FALLS ASSESS-DOCD LE1/YR: CPT | Mod: HCNC,CPTII,S$GLB, | Performed by: NURSE PRACTITIONER

## 2023-09-01 PROCEDURE — 1125F AMNT PAIN NOTED PAIN PRSNT: CPT | Mod: HCNC,CPTII,S$GLB, | Performed by: NURSE PRACTITIONER

## 2023-09-01 PROCEDURE — 99024 PR POST-OP FOLLOW-UP VISIT: ICD-10-PCS | Mod: HCNC,S$GLB,, | Performed by: NURSE PRACTITIONER

## 2023-09-01 PROCEDURE — 99999 PR PBB SHADOW E&M-EST. PATIENT-LVL III: CPT | Mod: PBBFAC,HCNC,, | Performed by: NURSE PRACTITIONER

## 2023-09-01 PROCEDURE — 99024 POSTOP FOLLOW-UP VISIT: CPT | Mod: HCNC,S$GLB,, | Performed by: NURSE PRACTITIONER

## 2023-09-01 PROCEDURE — 1125F PR PAIN SEVERITY QUANTIFIED, PAIN PRESENT: ICD-10-PCS | Mod: HCNC,CPTII,S$GLB, | Performed by: NURSE PRACTITIONER

## 2023-09-01 PROCEDURE — 73502 X-RAY EXAM HIP UNI 2-3 VIEWS: CPT | Mod: 26,HCNC,LT, | Performed by: RADIOLOGY

## 2023-09-01 PROCEDURE — 1159F MED LIST DOCD IN RCRD: CPT | Mod: HCNC,CPTII,S$GLB, | Performed by: PHYSICIAN ASSISTANT

## 2023-09-01 PROCEDURE — 1159F PR MEDICATION LIST DOCUMENTED IN MEDICAL RECORD: ICD-10-PCS | Mod: HCNC,CPTII,S$GLB, | Performed by: PHYSICIAN ASSISTANT

## 2023-09-01 PROCEDURE — 73502 X-RAY EXAM HIP UNI 2-3 VIEWS: CPT | Mod: TC,HCNC,LT

## 2023-09-01 PROCEDURE — 99213 PR OFFICE/OUTPT VISIT, EST, LEVL III, 20-29 MIN: ICD-10-PCS | Mod: HCNC,S$GLB,, | Performed by: PHYSICIAN ASSISTANT

## 2023-09-01 PROCEDURE — 99999 PR PBB SHADOW E&M-EST. PATIENT-LVL III: ICD-10-PCS | Mod: PBBFAC,HCNC,, | Performed by: NURSE PRACTITIONER

## 2023-09-01 PROCEDURE — 99999 PR PBB SHADOW E&M-EST. PATIENT-LVL III: CPT | Mod: PBBFAC,HCNC,, | Performed by: PHYSICIAN ASSISTANT

## 2023-09-01 PROCEDURE — 3044F HG A1C LEVEL LT 7.0%: CPT | Mod: HCNC,CPTII,S$GLB, | Performed by: PHYSICIAN ASSISTANT

## 2023-09-01 PROCEDURE — 1160F PR REVIEW ALL MEDS BY PRESCRIBER/CLIN PHARMACIST DOCUMENTED: ICD-10-PCS | Mod: HCNC,CPTII,S$GLB, | Performed by: NURSE PRACTITIONER

## 2023-09-01 PROCEDURE — 3044F HG A1C LEVEL LT 7.0%: CPT | Mod: HCNC,CPTII,S$GLB, | Performed by: NURSE PRACTITIONER

## 2023-09-01 PROCEDURE — 1160F RVW MEDS BY RX/DR IN RCRD: CPT | Mod: HCNC,CPTII,S$GLB, | Performed by: NURSE PRACTITIONER

## 2023-09-01 PROCEDURE — 3044F PR MOST RECENT HEMOGLOBIN A1C LEVEL <7.0%: ICD-10-PCS | Mod: HCNC,CPTII,S$GLB, | Performed by: NURSE PRACTITIONER

## 2023-09-01 PROCEDURE — 1160F PR REVIEW ALL MEDS BY PRESCRIBER/CLIN PHARMACIST DOCUMENTED: ICD-10-PCS | Mod: HCNC,CPTII,S$GLB, | Performed by: PHYSICIAN ASSISTANT

## 2023-09-01 PROCEDURE — 1101F PR PT FALLS ASSESS DOC 0-1 FALLS W/OUT INJ PAST YR: ICD-10-PCS | Mod: HCNC,CPTII,S$GLB, | Performed by: NURSE PRACTITIONER

## 2023-09-01 PROCEDURE — 3288F PR FALLS RISK ASSESSMENT DOCUMENTED: ICD-10-PCS | Mod: HCNC,CPTII,S$GLB, | Performed by: NURSE PRACTITIONER

## 2023-09-01 NOTE — PROGRESS NOTES
Chief Complaint & History of Present Illness:  Jessica Floyd is a established patient 73 y.o. female who is seen here today for review Her treatment plan.  She is been tentatively scheduled to begin Reclast treatments but is scheduled to see Dr. Valderrama to discuss shoulder replacement surgery.  We will delay beginning her Reclast treatments until after she has made a decision and or undergone her shoulder surgery      Review of patient's allergies indicates:  No Known Allergies      Current Outpatient Medications   Medication Sig Dispense Refill    allopurinoL (ZYLOPRIM) 100 MG tablet Take 2 tablets (200 mg total) by mouth once daily. 180 tablet 3    amLODIPine (NORVASC) 5 MG tablet Take 1 tablet (5 mg total) by mouth once daily. 30 tablet 3    atorvastatin (LIPITOR) 80 MG tablet TAKE 1 TABLET BY MOUTH EVERY DAY 90 tablet 3    calcium-vitamin D3 (OS-WHITNEY 500 + D3) 500 mg-5 mcg (200 unit) per tablet Take 2 tablets by mouth once daily. 15 tablet 0    carvediloL (COREG) 6.25 MG tablet Take 1 tablet (6.25 mg total) by mouth 2 (two) times daily with meals. 60 tablet 11    clotrimazole (LOTRIMIN) 1 % Soln Apply topically once daily. To affected toenails. 30 mL 6    DEPO-MEDROL 40 mg/mL injection       fluticasone propionate (FLONASE) 50 mcg/actuation nasal spray SPRAY ONCE INTO EACH NOSTRIL ONCE DAILY 16 mL 3    hydroCHLOROthiazide (HYDRODIURIL) 25 MG tablet Take 1 tablet (25 mg total) by mouth once daily. 90 tablet 3    melatonin (MELATIN) 3 mg tablet Take 2 tablets (6 mg total) by mouth nightly as needed for Insomnia.  0    MITIGARE 0.6 mg Cap Take 1 capsule (0.6 mg total) by mouth once daily. 90 capsule 3    montelukast (SINGULAIR) 10 mg tablet Take 1 tablet (10 mg total) by mouth every evening. 90 tablet 11    pantoprazole (PROTONIX) 40 MG tablet TAKE 1 TABLET BY MOUTH EVERY DAY 90 tablet 0    pregabalin (LYRICA) 75 MG capsule Take 1 capsule (75 mg total) by mouth every evening. 30 capsule 6    vitamin D (VITAMIN D3)  50 mcg (2,000 unit) Tab Take 1 tablet (2,000 Units total) by mouth once daily.       No current facility-administered medications for this visit.       Past Medical History:   Diagnosis Date    Allergic rhinitis     Amblyopia     Carotid stenosis     Coronary atherosclerosis     Outside [facility] 2014: 20% mLAD, 50% mCx, 20%, mRCA    Dyslipidemia     ESBL (extended spectrum beta-lactamase) producing bacteria infection     UTI    Hypertension     Nausea and vomiting 2017    Subarachnoid hemorrhage due to ruptured aneurysm        Past Surgical History:   Procedure Laterality Date    ADENOIDECTOMY      ANTERIOR CRUCIATE LIGAMENT REPAIR      APPENDECTOMY      CATARACT EXTRACTION W/  INTRAOCULAR LENS IMPLANT Right 2022    Procedure: EXTRACTION, CATARACT, WITH IOL INSERTION;  Surgeon: Higinio Cristina MD;  Location: [facility];  Service: Ophthalmology;  Laterality: Right;    CATARACT EXTRACTION W/  INTRAOCULAR LENS IMPLANT Left 2022    Procedure: EXTRACTION, CATARACT, WITH IOL INSERTION;  Surgeon: Higinio Cristina MD;  Location: [facility];  Service: Ophthalmology;  Laterality: Left;    CEREBRAL ANEURYSM REPAIR      clip     SECTION      DENTAL SURGERY      EYE SURGERY Bilateral     cataract removal and lens implants    HIP ARTHROPLASTY Left 2023    Procedure: TOTAL HIP ARTHROPLASTY;  Surgeon: Juan Wan MD;  Location: [address];  Service: Orthopedics;  Laterality: Left;    TONSILLECTOMY         Lab Results   Component Value Date     2023    K 3.7 2023     2023    CO2 26 2023    GLU 84 2023    BUN 29 (H) 2023    CREATININE 0.8 2023    CALCIUM 9.8 2023    PROT 6.6 2023    ALBUMIN 3.3 (L) 2023    BILITOT 0.5 2023    ALKPHOS 73 2023    ALKPHOS 73 2023    AST 28 2023    ALT 20 2023    ANIONGAP 9 2023    ESTGFRAFRICA >60.0 2022    EGFRNONAA 56.4 (A) 2022     "  Lab Results   Component Value Date    WBC 9.54 08/16/2023    RBC 4.00 08/16/2023    HGB 13.0 08/16/2023    HCT 41.3 08/16/2023     (H) 08/16/2023    MCH 32.5 (H) 08/16/2023    MCHC 31.5 (L) 08/16/2023    RDW 17.9 (H) 08/16/2023     08/16/2023    MPV 11.7 08/16/2023    GRAN 4.5 08/16/2023    GRAN 46.8 08/16/2023    LYMPH 2.3 08/16/2023    LYMPH 23.8 08/16/2023    MONO 2.0 (H) 08/16/2023    MONO 21.3 (H) 08/16/2023    EOS 0.6 (H) 08/16/2023    BASO 0.16 08/16/2023    EOSINOPHIL 5.9 08/16/2023    BASOPHIL 1.7 08/16/2023    DIFFMETHOD Automated 08/16/2023      Lab Results   Component Value Date    MG 1.7 06/12/2023      Lab Results   Component Value Date    PHOS 3.9 06/12/2023      Lab Results   Component Value Date    QZEWPVVF55VA 42 06/12/2023      Lab Results   Component Value Date    PTH 52.1 06/12/2023      Lab Results   Component Value Date    TSH 1.140 06/12/2023      Lab Results   Component Value Date    FREET4 0.93 06/12/2023      Lab Results   Component Value Date    HGBA1C 4.9 05/26/2023    ESTIMATEDAVG 94 05/26/2023      No results found for: "FREETESTOSTE"                                   Vital Signs (Most Recent)  There were no vitals filed for this visit.      Review of Systems:  ROS:  Constitutional: no fever or chills  Eyes: no visual changes, amblyopia  ENT: no nasal congestion or sore throat  Respiratory: no respiratory symptoms  Cardiovascular: no chest pain or palpitations, hypertension, coronary atherosclerosis coronary artery disease  Gastrointestinal: no nausea or vomiting, tolerating diet, positive for GERD  Genitourinary: no hematuria or dysuria, CKD 3 stage IIIA hypophosphatemia hypo magnesium hypokalemia  Integument/Breast: no rash or pruritis  Hematologic/Lymphatic: no easy bruising or lymphadenopathy, leukocytosis, iron deficiency anemia  Musculoskeletal: no arthralgias or myalgias, positive right rotator cuff tear, pathological left femur fracture  Neurological: no " seizures or tremors, positive subarachnoid hemorrhage  Behavioral/Psych: no auditory or visual hallucinations  Endocrine: no heat or cold intolerance, positive mild malnutrition,     she will continue with her calcium and vitamin D.  Fall prevention and personal safety have been reviewed.    .        Assessment and plan:      ICD-10-CM ICD-9-CM   1. Pathological fracture due to osteoporosis, unspecified fracture site, unspecified osteoporosis type, initial encounter  M80.80XA 733.10     733.00   2. Osteoporosis, unspecified osteoporosis type, unspecified pathological fracture presence  M81.0 733.00   3. Localized osteoporosis of Robin  M81.6 733.09       Delayed beginning Reclast treatment therapy until after cleared by Dr. Valderrama    Follow up 1-2 weeks after initiation of treatment to check Calcium, Vitamin D levels and access tolerance to medication.

## 2023-09-01 NOTE — PROGRESS NOTES
Ms. Floyd is here today for a post-operative visit after undergoing an Lt IDA by Dr. Wan on 5/28/2023.    Interval History:  She reports that she is doing well.  She reports no pain.  She is not taking pain medication.  She is having her shoulder replaced and is following up with .  She is going to outpatient PT at [facility].   She denies any falls or injuries since last seen in clinic.  She denies fever, chills, and sweats since the time of the surgery.     Physical exam:  Incision is open air and healed.  No signs of infection seen.    She has tactile stimulation to their lower leg, she denies calf pain, there is no leg edema and their pedal pulse is palpable x 2.  Range of motion of the hip is 0-90 degrees.  Patient walks with a antalgic gait with in the clinic room using a straight cane.      RADS: Left hip xray was obtained and personally reviewed by me, findings show left IDA appears to be in good position.  No evidence of hardware failure or new fractures seen.    Assessment:  Post-op visit (12 weeks)    Plan:    ICD-10-CM ICD-9-CM   1. Left displaced femoral neck fracture s/p IDA 5/28/23  S72.002A 820.8   2. Post-operative state  Z98.890 V45.89       Current care, treatment plan, precautions, activity level/ modifications, limitations, rehabilitation exercises and proposed future treatment were discussed with the patient. We discussed the need to monitor for changes in symptoms and condition and report them to the physician.  Discussed importance of compliance with all appointments and follow up examinations.       PHYSICAL THERAPY:   - Continue outpatient PT with [facility].  - Weight bearing as tolerated.  Continue core strengthening.    - Range of motion as tolerates.     PAIN MEDICATION:   - Pain medication: refill was not needed  - Pain medication refill policy provided to patient for review, yes.    - Patient was informed a multi-modal approach is used to treat their pain.      FOLLOW UP:    - Patient will follow up in the clinic in 12 weeks.  - X-ray of her Left hip is needed.    - Future Appointments:   Future Appointments   Date Time Provider Department Center   9/1/2023 10:45 AM Mckay Cosby NP [facility] ORTHO [address]   9/5/2023  9:15 AM Vlad Lopez III, PTA [facility] OPREHAB [place]   9/7/2023  9:15 AM [facility] XROP  [facility] XRAY OP [facility]   9/7/2023 10:00 AM Maryellen Valderrama MD [facility] HAND [facility]   9/8/2023  8:30 AM Vivien Choi, PT [facility] OPREHAB [place]   9/11/2023  8:30 AM Armani Cai PA-C NT [facility] [place]   9/18/2023  8:30 AM Armani Cai PA-C NT [facility] [place]   10/24/2023 10:00 AM Marilin Trevino DPM [facility] PODIATR [place]   12/15/2023  9:45 AM LAB, [facility] LAB [place]   12/18/2023 11:20 AM Anh Martinez MD [facility] HEM ONC [facility]   12/27/2023 10:30 AM Juan Naranjo MD [facility] RMTLGY [place]   3/5/2024  9:00 AM Michael Lal MD [facility] [id number] [facility]   6/17/2024  9:00 AM [facility] DEXA1 [facility] BMD [facility]       If there are any questions prior to scheduled follow up, the patient was instructed to contact the office

## 2023-09-02 DIAGNOSIS — K21.9 GASTROESOPHAGEAL REFLUX DISEASE WITHOUT ESOPHAGITIS: ICD-10-CM

## 2023-09-02 NOTE — TELEPHONE ENCOUNTER
No care due was identified.  [facility] Embedded Care Due Messages. Reference number: [id number].   9/02/2023 7:20:03 AM CDT

## 2023-09-03 ENCOUNTER — PATIENT MESSAGE (OUTPATIENT)
Dept: RHEUMATOLOGY | Facility: CLINIC | Age: 73
End: 2023-09-03
Payer: MEDICARE

## 2023-09-03 ENCOUNTER — PATIENT MESSAGE (OUTPATIENT)
Dept: ORTHOPEDICS | Facility: CLINIC | Age: 73
End: 2023-09-03
Payer: MEDICARE

## 2023-09-05 RX ORDER — PANTOPRAZOLE SODIUM 40 MG/1
TABLET, DELAYED RELEASE ORAL
Qty: 90 TABLET | Refills: 0 | Status: SHIPPED | OUTPATIENT
Start: 2023-09-05 | End: 2023-10-04 | Stop reason: SDUPTHER

## 2023-09-05 RX ORDER — CELECOXIB 200 MG/1
200 CAPSULE ORAL DAILY
Qty: 30 CAPSULE | Refills: 1 | Status: SHIPPED | OUTPATIENT
Start: 2023-09-05 | End: 2023-10-24

## 2023-09-05 NOTE — TELEPHONE ENCOUNTER
Refill Encounter    PCP Visits: Recent Visits  Date Type Provider Dept   03/28/23 Office Visit Effie Olmedo MD [facility]   03/14/23 Office Visit Effie Olmedo MD [facility]   Showing recent visits within past 360 days and meeting all other requirements  Future Appointments  No visits were found meeting these conditions.  Showing future appointments within next 720 days and meeting all other requirements     Last 3 Blood Pressure:   BP Readings from Last 3 Encounters:   08/28/23 110/70   08/22/23 (!) 145/76   08/18/23 128/71     Preferred Pharmacy:   [facility]/pharmacy #[id number] - [place], [place] - [address]  [address] [address]  Phone: [phone] Fax: [phone]    [facility] - [place], [place] - [address]  [address] [address]  Phone: [phone] Fax: [phone]    Requested RX:  Requested Prescriptions     Pending Prescriptions Disp Refills    pantoprazole (PROTONIX) 40 MG tablet [Pharmacy Med Name: PANTOPRAZOLE SOD DR 40 MG TAB] 90 tablet 0     Sig: TAKE 1 TABLET BY MOUTH EVERY DAY      RX Route: Normal

## 2023-09-06 ENCOUNTER — TELEPHONE (OUTPATIENT)
Dept: INFUSION THERAPY | Facility: OTHER | Age: 73
End: 2023-09-06
Payer: MEDICARE

## 2023-09-07 ENCOUNTER — HOSPITAL ENCOUNTER (OUTPATIENT)
Dept: RADIOLOGY | Facility: OTHER | Age: 73
Discharge: HOME | End: 2023-09-07
Attending: ORTHOPAEDIC SURGERY
Payer: MEDICARE

## 2023-09-07 ENCOUNTER — PATIENT MESSAGE (OUTPATIENT)
Dept: ADMINISTRATIVE | Facility: OTHER | Age: 73
End: 2023-09-07
Payer: MEDICARE

## 2023-09-07 ENCOUNTER — OFFICE VISIT (OUTPATIENT)
Dept: ORTHOPEDICS | Facility: CLINIC | Age: 73
End: 2023-09-07
Payer: MEDICARE

## 2023-09-07 VITALS — BODY MASS INDEX: 21.95 KG/M2 | WEIGHT: 123.88 LBS | HEIGHT: 63 IN

## 2023-09-07 DIAGNOSIS — G89.29 CHRONIC RIGHT SHOULDER PAIN: ICD-10-CM

## 2023-09-07 DIAGNOSIS — M25.511 CHRONIC RIGHT SHOULDER PAIN: ICD-10-CM

## 2023-09-07 DIAGNOSIS — M25.511 RIGHT SHOULDER PAIN, UNSPECIFIED CHRONICITY: Primary | ICD-10-CM

## 2023-09-07 PROCEDURE — 1101F PR PT FALLS ASSESS DOC 0-1 FALLS W/OUT INJ PAST YR: ICD-10-PCS | Mod: HCNC,CPTII,S$GLB, | Performed by: ORTHOPAEDIC SURGERY

## 2023-09-07 PROCEDURE — 99999 PR PBB SHADOW E&M-EST. PATIENT-LVL III: ICD-10-PCS | Mod: PBBFAC,HCNC,, | Performed by: ORTHOPAEDIC SURGERY

## 2023-09-07 PROCEDURE — 1125F PR PAIN SEVERITY QUANTIFIED, PAIN PRESENT: ICD-10-PCS | Mod: HCNC,CPTII,S$GLB, | Performed by: ORTHOPAEDIC SURGERY

## 2023-09-07 PROCEDURE — 99999 PR PBB SHADOW E&M-EST. PATIENT-LVL III: CPT | Mod: PBBFAC,HCNC,, | Performed by: ORTHOPAEDIC SURGERY

## 2023-09-07 PROCEDURE — 1101F PT FALLS ASSESS-DOCD LE1/YR: CPT | Mod: HCNC,CPTII,S$GLB, | Performed by: ORTHOPAEDIC SURGERY

## 2023-09-07 PROCEDURE — 3044F HG A1C LEVEL LT 7.0%: CPT | Mod: HCNC,CPTII,S$GLB, | Performed by: ORTHOPAEDIC SURGERY

## 2023-09-07 PROCEDURE — 73030 XR SHOULDER TRAUMA 3 VIEW RIGHT: ICD-10-PCS | Mod: 26,HCNC,RT, | Performed by: RADIOLOGY

## 2023-09-07 PROCEDURE — 1159F MED LIST DOCD IN RCRD: CPT | Mod: HCNC,CPTII,S$GLB, | Performed by: ORTHOPAEDIC SURGERY

## 2023-09-07 PROCEDURE — 1125F AMNT PAIN NOTED PAIN PRSNT: CPT | Mod: HCNC,CPTII,S$GLB, | Performed by: ORTHOPAEDIC SURGERY

## 2023-09-07 PROCEDURE — 99204 PR OFFICE/OUTPT VISIT, NEW, LEVL IV, 45-59 MIN: ICD-10-PCS | Mod: HCNC,S$GLB,, | Performed by: ORTHOPAEDIC SURGERY

## 2023-09-07 PROCEDURE — 3288F PR FALLS RISK ASSESSMENT DOCUMENTED: ICD-10-PCS | Mod: HCNC,CPTII,S$GLB, | Performed by: ORTHOPAEDIC SURGERY

## 2023-09-07 PROCEDURE — 99204 OFFICE O/P NEW MOD 45 MIN: CPT | Mod: HCNC,S$GLB,, | Performed by: ORTHOPAEDIC SURGERY

## 2023-09-07 PROCEDURE — 3008F PR BODY MASS INDEX (BMI) DOCUMENTED: ICD-10-PCS | Mod: HCNC,CPTII,S$GLB, | Performed by: ORTHOPAEDIC SURGERY

## 2023-09-07 PROCEDURE — 1159F PR MEDICATION LIST DOCUMENTED IN MEDICAL RECORD: ICD-10-PCS | Mod: HCNC,CPTII,S$GLB, | Performed by: ORTHOPAEDIC SURGERY

## 2023-09-07 PROCEDURE — 73030 X-RAY EXAM OF SHOULDER: CPT | Mod: 26,HCNC,RT, | Performed by: RADIOLOGY

## 2023-09-07 PROCEDURE — 3288F FALL RISK ASSESSMENT DOCD: CPT | Mod: HCNC,CPTII,S$GLB, | Performed by: ORTHOPAEDIC SURGERY

## 2023-09-07 PROCEDURE — 3008F BODY MASS INDEX DOCD: CPT | Mod: HCNC,CPTII,S$GLB, | Performed by: ORTHOPAEDIC SURGERY

## 2023-09-07 PROCEDURE — 73030 X-RAY EXAM OF SHOULDER: CPT | Mod: TC,HCNC,FY,RT

## 2023-09-07 PROCEDURE — 3044F PR MOST RECENT HEMOGLOBIN A1C LEVEL <7.0%: ICD-10-PCS | Mod: HCNC,CPTII,S$GLB, | Performed by: ORTHOPAEDIC SURGERY

## 2023-09-07 NOTE — PROGRESS NOTES
Subjective:      Patient ID: Jessica Floyd is a 73 y.o. female.    Chief Complaint: Pain of the Right Shoulder      HPI  Jessica Floyd is a right hand dominant 73 y.o. female presenting today for right shoulder pain. She is ambulatory with a walker. Patient has a long history of longstanding severe right shoulder pain secondary to traumatic complete rotator cuff tear has had extensive nonsurgical treatments to include injection therapies and physical therapy but continues to digress.  .  There was not a history of trauma.  Onset of symptoms began about a year ago.  She had a total hip replacement 5/23. She was seen by a PA at sports for her hip and shoulder but wanted to see Dr Valderrama for a possible reverse total shoulder replacement    Pt has pain in shoulder 8/10. She had a IDA and has had no pain since surgery.  Pt puts the heat on it at the end of the day. Pain in shoulder has not stopped her from doing anything. Goes to PT 3 days a week for a year. Does PT constantly uses pullies.       Review of patient's allergies indicates:  No Known Allergies      Current Outpatient Medications   Medication Sig Dispense Refill    allopurinoL (ZYLOPRIM) 100 MG tablet Take 2 tablets (200 mg total) by mouth once daily. 180 tablet 3    amLODIPine (NORVASC) 5 MG tablet Take 1 tablet (5 mg total) by mouth once daily. 30 tablet 3    atorvastatin (LIPITOR) 80 MG tablet TAKE 1 TABLET BY MOUTH EVERY DAY 90 tablet 3    calcium-vitamin D3 (OS-WHITNEY 500 + D3) 500 mg-5 mcg (200 unit) per tablet Take 2 tablets by mouth once daily. 15 tablet 0    carvediloL (COREG) 6.25 MG tablet Take 1 tablet (6.25 mg total) by mouth 2 (two) times daily with meals. 60 tablet 11    celecoxib (CELEBREX) 200 MG capsule Take 1 capsule (200 mg total) by mouth once daily. 30 capsule 1    clotrimazole (LOTRIMIN) 1 % Soln Apply topically once daily. To affected toenails. 30 mL 6    DEPO-MEDROL 40 mg/mL injection       fluticasone propionate (FLONASE) 50  mcg/actuation nasal spray SPRAY ONCE INTO EACH NOSTRIL ONCE DAILY 16 mL 3    hydroCHLOROthiazide (HYDRODIURIL) 25 MG tablet Take 1 tablet (25 mg total) by mouth once daily. 90 tablet 3    melatonin (MELATIN) 3 mg tablet Take 2 tablets (6 mg total) by mouth nightly as needed for Insomnia.  0    MITIGARE 0.6 mg Cap Take 1 capsule (0.6 mg total) by mouth once daily. 90 capsule 3    montelukast (SINGULAIR) 10 mg tablet Take 1 tablet (10 mg total) by mouth every evening. 90 tablet 11    pantoprazole (PROTONIX) 40 MG tablet TAKE 1 TABLET BY MOUTH EVERY DAY 90 tablet 0    pregabalin (LYRICA) 75 MG capsule Take 1 capsule (75 mg total) by mouth every evening. 30 capsule 6    vitamin D (VITAMIN D3) 50 mcg (2,000 unit) Tab Take 1 tablet (2,000 Units total) by mouth once daily.       No current facility-administered medications for this visit.       Past Medical History:   Diagnosis Date    Allergic rhinitis     Amblyopia     Carotid stenosis     Coronary atherosclerosis     Outside [facility] 2014: 20% mLAD, 50% mCx, 20%, mRCA    Dyslipidemia     ESBL (extended spectrum beta-lactamase) producing bacteria infection     UTI    Hypertension     Nausea and vomiting 2017    Subarachnoid hemorrhage due to ruptured aneurysm        Past Surgical History:   Procedure Laterality Date    ADENOIDECTOMY      ANTERIOR CRUCIATE LIGAMENT REPAIR      APPENDECTOMY      CATARACT EXTRACTION W/  INTRAOCULAR LENS IMPLANT Right 2022    Procedure: EXTRACTION, CATARACT, WITH IOL INSERTION;  Surgeon: Higinio Cristina MD;  Location: [facility];  Service: Ophthalmology;  Laterality: Right;    CATARACT EXTRACTION W/  INTRAOCULAR LENS IMPLANT Left 2022    Procedure: EXTRACTION, CATARACT, WITH IOL INSERTION;  Surgeon: Higinio Cristina MD;  Location: [facility] OR;  Service: Ophthalmology;  Laterality: Left;    CEREBRAL ANEURYSM REPAIR      clip     SECTION      DENTAL SURGERY      EYE SURGERY Bilateral     cataract removal and lens  "implants    HIP ARTHROPLASTY Left 5/28/2023    Procedure: TOTAL HIP ARTHROPLASTY;  Surgeon: Juan Wan MD;  Location: [facility] OR [address] [place] [address];  Service: Orthopedics;  Laterality: Left;    TONSILLECTOMY         Review of Systems:  Constitutional: Negative for chills and fever.   Respiratory: Negative for cough and shortness of breath.    Gastrointestinal: Negative for nausea and vomiting.   Skin: Negative for rash.   Neurological: Negative for dizziness and headaches.   Psychiatric/Behavioral: Negative for depression.   MSK as in HPI       OBJECTIVE:     PHYSICAL EXAM:  Ht 5' 2.99" (1.6 m)   Wt 56.2 kg (123 lb 14.4 oz)   BMI 21.95 kg/m²     GEN:  NAD, well-developed, well-groomed.  NEURO: Awake, alert, and oriented. Normal attention and concentration.    PSYCH: Normal mood and affect. Behavior is normal.  HEENT: No cervical lymphadenopathy noted.  CARDIOVASCULAR: Radial pulses 2+ bilaterally. No LE edema noted.  PULMONARY: Breath sounds normal. No respiratory distress.  SKIN: Intact, no rashes.      MSK:     RUE:  Good active ROM of the wrist and fingers. AIN/PIN/Radial/Median/Ulnar Nerves assessed in isolation without deficit. Radial & Ulnar arteries palpated 2+. Capillary Refill <3s.  Limited ROM due to pain   IR Glute  Flexion 160  Full thickness tear of her Rotator cuff supra and infraspinatus     LUE:  Good active ROM of the wrist and fingers. AIN/PIN/Radial/Median/Ulnar Nerves assessed in isolation without deficit. Radial & Ulnar arteries palpated 2+. Capillary Refill <3s.      RADIOGRAPHS:  Rotator cuff: There is a large full-thickness rotator cuff tear involving the entire supra and infraspinatus tendons.  There is tendon retraction to the glenoid rim.  The teres minor and subscapularis are intact.  There is mild volume loss of both supra and infraspinatus muscles.  High-riding humeral head noted abutting the acromion.     Mild AC joint arthropathy.     The biceps tendon not identified, probably " torn.     Generalized cartilage thinning/joint space narrowing noted with mild osteophytosis along the inferior margin of the humeral head.  No fractures or marrow replacement process.  No evidence of osteomyelitis.  No axillary lymphadenopathy.  Emphysematous changes noted in the right lung apex.     Impression:     Large full-thickness rotator cuff tear, involving the infraspinatus and supraspinatus tendons, as above.  Comments: I have personally reviewed the imaging and I agree with the above radiologist's report.    ASSESSMENT/PLAN:   No diagnosis found.       No orders of the defined types were placed in this encounter.       Plan:     We will need confirmation from Dr. Saucedo as well as her heme Onc doctor to confirm patient can proceed with shoulder surgery most likely were thinking about January we discussed the surgery in great detail but patient is still using a rolling walker she feels like she does not need it she feels like it is better than a cane I do want her to be cleared for surgery she will have blood consents as well she does have iron deficiency anemia we explained the surgery and postoperative protocol in detail    Patient does have significant pain in her shoulder this would be a good surgery for her postoperative pain      The patient indicates understanding of these issues and agrees to the plan.    Amber Santillan ATC, [facility]  [facility]   [facility]  [place], [place]

## 2023-09-08 ENCOUNTER — TELEPHONE (OUTPATIENT)
Dept: HEMATOLOGY/ONCOLOGY | Facility: CLINIC | Age: 73
End: 2023-09-08
Payer: MEDICARE

## 2023-09-08 ENCOUNTER — PATIENT MESSAGE (OUTPATIENT)
Dept: HEMATOLOGY/ONCOLOGY | Facility: CLINIC | Age: 73
End: 2023-09-08
Payer: MEDICARE

## 2023-09-08 ENCOUNTER — CLINICAL SUPPORT (OUTPATIENT)
Dept: REHABILITATION | Facility: OTHER | Age: 73
End: 2023-09-08
Payer: MEDICARE

## 2023-09-08 ENCOUNTER — PATIENT MESSAGE (OUTPATIENT)
Dept: ORTHOPEDICS | Facility: CLINIC | Age: 73
End: 2023-09-08
Payer: MEDICARE

## 2023-09-08 DIAGNOSIS — M25.60 DECREASED RANGE OF MOTION WITH DECREASED STRENGTH: ICD-10-CM

## 2023-09-08 DIAGNOSIS — R53.1 DECREASED RANGE OF MOTION WITH DECREASED STRENGTH: ICD-10-CM

## 2023-09-08 DIAGNOSIS — Z74.09 IMPAIRED FUNCTIONAL MOBILITY, BALANCE, GAIT, AND ENDURANCE: Primary | ICD-10-CM

## 2023-09-08 PROCEDURE — 97530 THERAPEUTIC ACTIVITIES: CPT | Mod: HCNC,PN

## 2023-09-08 PROCEDURE — 97110 THERAPEUTIC EXERCISES: CPT | Mod: HCNC,PN

## 2023-09-08 NOTE — TELEPHONE ENCOUNTER
----- Message from Anh Martinez MD sent at 9/8/2023  3:03 PM CDT -----  I have reviewed the xray and they are ok. Please call the patient and let them know.

## 2023-09-08 NOTE — PROGRESS NOTES
[facility]   Physical Therapy Treatment Note      Name: Jessica Floyd  Clinic Number: [id number]    Therapy Diagnosis:   Encounter Diagnoses   Name Primary?    Impaired functional mobility, balance, gait, and endurance Yes    Decreased range of motion with decreased strength          Physician: Armani Cai PA-C    Visit Date: 9/8/2023    Physician Orders: PT Eval and Treat -- Weight bear as tolerated and ROMAT - caution with posterior hip activities, Work on core strength  Medical Diagnosis from Referral: S72.002A (ICD-10-CM) - Left displaced femoral neck fracture   Evaluation Date: 8/2/2023  Authorization Period Expiration: 10/01/2023  Plan of Care Expiration: 11/2/2023  Progress Note Due: last 9/8/23 (next one 10/8/23)  Visit # / Visits authorized: 7/ 11  FOTO: 2/ 3        Time In: 8:43 am (late arrival)  Time Out: 9:15  Total Billable Time: 32 minutes        Precautions: Standard, 1997 brain aneurysm burst impacting L field of vision and temporary paralysis on L side     PTA Visit #: 0/5       Subjective     Pt reports: her knee and hip are treating her better. Her shoulder is continuing to bother her and the MD stated she would be a candidate for a reverse total shoulder replacement.   She was compliant with home exercise program.  Response to previous treatment: good response  Functional change: improved ambulation due to knee injection     Pain: 0/10  Location: Left Hip      Objective      Objective Measures updated for progress note.    9/8/2023    Lumbar AROM Pain/Dysfunction with Movement:   Flexion WFL Good spine movement, decreased hip flex >90 per MD protocol   Right rotation WFL No pain   Left rotation WFL No pain         Strength:  Hip Left  Right    Flexion 4/5 4/5   Abduction 4/5 4+/5   Adduction 4+/5 4+/5   Extension 4/5   4/5       Knee Left  Right   Extension 4+/5 4/5      Ankle Left  Right    Dorsiflexion 4+/5 4+/5   Plantarflexion 25 heel raises with SC - started  "fatiguing 25 heel raises with SC - started fatiguing         Knee Left  Right    Extension 0 0   Flexion 130 140      Ankle Left  Right    Dorsiflexion 7 6   Plantarflexion 51 40      Hip ROM:  - R hip flexion: 110 (active)  - L hip flexion: 115 (active)      Flexibility: HS = fair (~ 85-90 bilaterally)    TUG: did not assess this visit due to patient request as she felt unstable today from lack of sleep  - 35 seconds with RW - initial eval     30 second sit-to-stand test (without UE support): (did not assess this visit due to patient request as she feels tired and off balance today)   0 without UE support; 5 with UE support - initial eval     Gait Analysis: Mod I with SPC, step through, bradykinetic with WBOS    Treatment     Jessica received the treatments listed in bold below:         therapeutic exercises to develop strength, endurance, ROM, flexibility, posture, and core stabilization for 10 minutes including:    Straight leg raise: x 15 bilaterally  HL 3-way clam 2 x 10 x RTB   + bridges x 15 (RTB at knees)    + standing hip 3 way UE support on mat x 20 each direction   + supine hip abduction 1# 2 x 10     hip ADD 20 x 5"    Quad sets: 5"x15   Sidelying hip abduction: attempted but UA 2* pain in R shoulder (RTC tear)  Long arc quads: 30x   B HR  30x  Hip abd stand 15x 2  B       manual therapy techniques: Joint mobilizations and Manual traction were applied to the: L hip for  00 minutes, including:  Gentle PROM into flex L 20 sec x 3   Gentle inf glide np   PROM ER/ MR flex 90 gentle 20 sec x 3      therapeutic activities to improve functional performance for 22 minutes, including:  + Reassessment of goals, objectives, and FOTO  + Reviewed HEP exercises   Supine marches 2 x 10 1# ankle weight   Sit<>stands: elevated no UE support x 10   + STS regular height, unilateral UE support x 15  Bridges w/ ball squeeze 2 x 10     Heat applied to R shoulder during mat exercises (not billed)    Patient Education and Home " Exercises       Education provided:   - pt educated to continue HEP at home  - pt educated on progress with goals  - pt educated on current POC and benefit of therapy    Written Home Exercises Provided: Patient instructed to cont prior HEP. Exercises were reviewed and Jessica was able to demonstrate them prior to the end of the session.  Jessica demonstrated good  understanding of the education provided. See EMR under Patient Instructions for exercises provided during therapy sessions    Assessment     Reassessment completed this visit with Jessica showing improvements in her lower extremity strength and motion in her hip. Some improvements in muscle flexibility however, patient continues to demonstrate tightness through bilateral hamstrings. Balance assessment not completed today due to patient verbalizing feeling unsteady from lack of sleep. Plan to assess TUG and STS test next visit as well as completing OLVERA and DGI as able. Jessica did meet several short and several long term goals that were set. Several LE strengthening exercises performed this visit with pt demonstrating good form and tolerance. Educated pt to continue with HEP at home to maintain her progress with pt verbalizing understanding. Patient will continue to benefit from skilled PT to address strength and balance limitations. Continue with current POC set through 11/2/23 working on LE strength and improving balance for safety and independence with ambulation and daily tasks.     Jessica Is progressing well towards her goals.   Pt prognosis is Good.     Pt will continue to benefit from skilled outpatient physical therapy to address the deficits listed in the problem list box on initial evaluation, provide pt/family education and to maximize pt's level of independence in the home and community environment.     Pt's spiritual, cultural and educational needs considered and pt agreeable to plan of care and goals.     Anticipated barriers to physical therapy:  "L shoulder OA, focus     Goals: updated 9/8/23  Short Term Goals (6 Weeks):  1. Pt able to stand >=30 minutes with ADLs with <3/10 pain. MET  2. Pt able to walk >=30 minutes with household chores with <3/10 pain. MET  3. Pt able to ascend/descend curb, independently, 1 handrail, with <3/10 pain. MET  4. Pt able to transfer in/out of chair 8x in 30 sec with 1 HHA to demonstrate improving functional strength MET  5. Pt to improve TUG test to >/=25" to demonstrate improving motor control/coordination with transfers.  Progressing, not met  6. Pt to improve static balance with OLVERA balance test >/=46/56 for stability with ADLs. Progressing, not met     Long Term Goals (12 Weeks):  1. Pt able to stand >=45 minutes with ADLs with <3/10 pain. MET  2. Pt able to walk >=45 minutes with household chores with <3/10 pain. MET  3. Pt able to ascend/descend 1 set of stairs, independently, 1 handrail, with <3/10 pain. MET  4. Pt able to transfer in/out of chair 10x in 30 sec with 1 HHA to demonstrate improving functional strength Progressing, not met  5. Pt to improve TUG test to >/=15" to demonstrate improving motor control/coordination with transfers.  Progressing, not met  6. Pt to improve static balance with OLVERA balance test >/=46/56 for stability with ADLs. Progressing, not met  7. Pt to demonstrate improved dynamic balance with DGI >/=19/24 for safety with ambulation. Progressing, not met    Plan     Plan of care Certification: 8/2/2023 to 11/2/2023.     Continue current POC progressing towards PT goals set. Work on LE strengthening and balance tasks.     Vivien Choi, PT      "

## 2023-09-11 ENCOUNTER — OFFICE VISIT (OUTPATIENT)
Dept: SPORTS MEDICINE | Facility: CLINIC | Age: 73
End: 2023-09-11
Payer: MEDICARE

## 2023-09-11 VITALS
SYSTOLIC BLOOD PRESSURE: 162 MMHG | HEIGHT: 62 IN | WEIGHT: 123 LBS | DIASTOLIC BLOOD PRESSURE: 90 MMHG | BODY MASS INDEX: 22.63 KG/M2

## 2023-09-11 DIAGNOSIS — M17.12 PRIMARY OSTEOARTHRITIS OF LEFT KNEE: Primary | ICD-10-CM

## 2023-09-11 DIAGNOSIS — D50.8 OTHER IRON DEFICIENCY ANEMIA: Primary | ICD-10-CM

## 2023-09-11 PROCEDURE — 99499 UNLISTED E&M SERVICE: CPT | Mod: HCNC,S$GLB,, | Performed by: PHYSICIAN ASSISTANT

## 2023-09-11 PROCEDURE — 20610 LARGE JOINT ASPIRATION/INJECTION: L KNEE: ICD-10-PCS | Mod: HCNC,LT,S$GLB, | Performed by: PHYSICIAN ASSISTANT

## 2023-09-11 PROCEDURE — 99999 PR PBB SHADOW E&M-EST. PATIENT-LVL III: CPT | Mod: PBBFAC,HCNC,, | Performed by: PHYSICIAN ASSISTANT

## 2023-09-11 PROCEDURE — 20610 DRAIN/INJ JOINT/BURSA W/O US: CPT | Mod: HCNC,LT,S$GLB, | Performed by: PHYSICIAN ASSISTANT

## 2023-09-11 PROCEDURE — 99499 NO LOS: ICD-10-PCS | Mod: HCNC,S$GLB,, | Performed by: PHYSICIAN ASSISTANT

## 2023-09-11 PROCEDURE — 99999 PR PBB SHADOW E&M-EST. PATIENT-LVL III: ICD-10-PCS | Mod: PBBFAC,HCNC,, | Performed by: PHYSICIAN ASSISTANT

## 2023-09-11 NOTE — PROGRESS NOTES
ESTABLISHED PATIENT    History 9/11/2023:  Jessica is here for her left knee Orthovisc # 2 of 3 injection.    History 8/28/2023:  Jessica is here for her left knee Orthovisc #1 of 3 injection.    History 8/14/2023:  Jessica returns here today for follow-up evaluation of her left knee.  She complains today of having recurrent pain over the last 2 weeks.  The previous Orthovisc series gave her significant relief and lasted approximately 8 months.    Of note, she is scheduled to see Dr. Emmanuel next month for her right shoulder and to discuss reverse shoulder replacement.    History 1/23/2023:  Jessica was seen for right shoulder pain.  She states her left knee pain has resolved since receiving the Orthovisc series.    History 12/5/2022:  Jessica returns here today for her 3rd left knee Orthovisc injection.    History 11/28/2022:  Jessica returns here today for her 2nd left knee Orthovisc injection.    History 11/14/2022:  Jessica returns here today for her 1st left knee Orthovisc injection.    History 10/31/2022:  73 y.o. Female with a history of left knee pain who reports having chronic patellar instability with 3 dislocations requiring manual reduction beginning when she was 11 years old.  She has had 2 arthroscopic procedures which were clean out procedures and not reconstructive.  She was previously seen by another provider and given that Euflexxa injections approximately 4 years ago.  She states these injections give her temporary relief.  She recently completed an extended course of physical therapy which also gave her relief.  Her biggest complaint today is anterior knee pain when sitting for any length of time.  She denies having any mechanical catching or popping sensations.  She has had no recent subluxation or instability events.        - mechanical symptoms, - instability    Is affecting ADLs.  Pain is 10/10 at it's worst.        PAST MEDICAL HISTORY    Past Medical History:   Diagnosis Date    Allergic  rhinitis     Amblyopia     Carotid stenosis     Coronary atherosclerosis     [facility] 2014: 20% mLAD, 50% mCx, 20%, mRCA    Dyslipidemia     ESBL (extended spectrum beta-lactamase) producing bacteria infection     UTI    Hypertension     Nausea and vomiting 2017    Subarachnoid hemorrhage due to ruptured aneurysm        PAST SURGICAL HISTORY     Past Surgical History:   Procedure Laterality Date    ADENOIDECTOMY      ANTERIOR CRUCIATE LIGAMENT REPAIR      APPENDECTOMY      CATARACT EXTRACTION W/  INTRAOCULAR LENS IMPLANT Right 2022    Procedure: EXTRACTION, CATARACT, WITH IOL INSERTION;  Surgeon: Higinio Cristina MD;  [facility];  Service: Ophthalmology;  Laterality: Right;    CATARACT EXTRACTION W/  INTRAOCULAR LENS IMPLANT Left 2022    Procedure: EXTRACTION, CATARACT, WITH IOL INSERTION;  Surgeon: Higinio Cristina MD;  Location: [facility];  Service: Ophthalmology;  Laterality: Left;    CEREBRAL ANEURYSM REPAIR      clip     SECTION      DENTAL SURGERY      EYE SURGERY Bilateral     cataract removal and lens implants    HIP ARTHROPLASTY Left 2023    Procedure: TOTAL HIP ARTHROPLASTY;  Surgeon: Juan Wan MD;  Location: [address];  Service: Orthopedics;  Laterality: Left;    TONSILLECTOMY         FAMILY HISTORY    Family History   Problem Relation Age of Onset    Hypertension Mother     Aneurysm Mother     Hypertension Father     Alcohol abuse Father     Kidney disease Brother     Heart disease Brother     Gout Brother     No Known Problems Daughter     No Known Problems Daughter     No Known Problems Daughter        SOCIAL HISTORY    Social History     Socioeconomic History    Marital status:    Occupational History    Occupation: Retired   Tobacco Use    Smoking status: Former     Current packs/day: 0.00     Average packs/day: 0.5 packs/day for 20.0 years (10.0 ttl pk-yrs)     Types: Cigarettes     Start date: 1979     Quit date:  1999     Years since quittin.7     Passive exposure: Past    Smokeless tobacco: Never   Substance and Sexual Activity    Alcohol use: Yes     Alcohol/week: 7.0 standard drinks of alcohol     Types: 7 Glasses of wine per week    Drug use: No    Sexual activity: Yes     Partners: Male   Social History Narrative    .  Has 3 grown daughters.   lives in [place] [place].       Social Determinants of Health     Financial Resource Strain: Low Risk  (2023)    Overall Financial Resource Strain (CARDIA)     Difficulty of Paying Living Expenses: Not very hard   Food Insecurity: No Food Insecurity (2023)    Hunger Vital Sign     Worried About Running Out of Food in the Last Year: Never true     Ran Out of Food in the Last Year: Never true   Transportation Needs: No Transportation Needs (2023)    PRAPARE - Transportation     Lack of Transportation (Medical): No     Lack of Transportation (Non-Medical): No   Physical Activity: Sufficiently Active (2023)    Exercise Vital Sign     Days of Exercise per Week: 7 days     Minutes of Exercise per Session: 60 min   Recent Concern: Physical Activity - Insufficiently Active (2023)    Exercise Vital Sign     Days of Exercise per Week: 3 days     Minutes of Exercise per Session: 30 min   Stress: No Stress Concern Present (2023)    Citizen of [place] of Occupational Health - Occupational Stress Questionnaire     Feeling of Stress : Only a little   Social Connections: Socially Integrated (2023)    Social Connection and Isolation Panel [NHANES]     Frequency of Communication with Friends and Family: More than three times a week     Frequency of Social Gatherings with Friends and Family: More than three times a week     Attends Sikh Services: 1 to 4 times per year     Active Member of Clubs or Organizations: Yes     Attends Club or Organization Meetings: More than 4 times per year     Marital Status:    Housing Stability: Low Risk   (6/9/2023)    Housing Stability Vital Sign     Unable to Pay for Housing in the Last Year: No     Number of Places Lived in the Last Year: 1     Unstable Housing in the Last Year: No       MEDICATIONS      Current Outpatient Medications:     allopurinoL (ZYLOPRIM) 100 MG tablet, Take 2 tablets (200 mg total) by mouth once daily., Disp: 180 tablet, Rfl: 3    amLODIPine (NORVASC) 5 MG tablet, Take 1 tablet (5 mg total) by mouth once daily., Disp: 30 tablet, Rfl: 3    atorvastatin (LIPITOR) 80 MG tablet, TAKE 1 TABLET BY MOUTH EVERY DAY, Disp: 90 tablet, Rfl: 3    calcium-vitamin D3 (OS-WHITNEY 500 + D3) 500 mg-5 mcg (200 unit) per tablet, Take 2 tablets by mouth once daily., Disp: 15 tablet, Rfl: 0    carvediloL (COREG) 6.25 MG tablet, Take 1 tablet (6.25 mg total) by mouth 2 (two) times daily with meals., Disp: 60 tablet, Rfl: 11    celecoxib (CELEBREX) 200 MG capsule, Take 1 capsule (200 mg total) by mouth once daily., Disp: 30 capsule, Rfl: 1    clotrimazole (LOTRIMIN) 1 % Soln, Apply topically once daily. To affected toenails., Disp: 30 mL, Rfl: 6    DEPO-MEDROL 40 mg/mL injection, , Disp: , Rfl:     fluticasone propionate (FLONASE) 50 mcg/actuation nasal spray, SPRAY ONCE INTO EACH NOSTRIL ONCE DAILY, Disp: 16 mL, Rfl: 3    hydroCHLOROthiazide (HYDRODIURIL) 25 MG tablet, Take 1 tablet (25 mg total) by mouth once daily., Disp: 90 tablet, Rfl: 3    melatonin (MELATIN) 3 mg tablet, Take 2 tablets (6 mg total) by mouth nightly as needed for Insomnia., Disp: , Rfl: 0    MITIGARE 0.6 mg Cap, Take 1 capsule (0.6 mg total) by mouth once daily., Disp: 90 capsule, Rfl: 3    montelukast (SINGULAIR) 10 mg tablet, Take 1 tablet (10 mg total) by mouth every evening., Disp: 90 tablet, Rfl: 11    pantoprazole (PROTONIX) 40 MG tablet, TAKE 1 TABLET BY MOUTH EVERY DAY, Disp: 90 tablet, Rfl: 0    pregabalin (LYRICA) 75 MG capsule, Take 1 capsule (75 mg total) by mouth every evening., Disp: 30 capsule, Rfl: 6    vitamin D (VITAMIN D3)  "50 mcg (2,000 unit) Tab, Take 1 tablet (2,000 Units total) by mouth once daily., Disp: , Rfl:     ALLERGIES     Review of patient's allergies indicates:  No Known Allergies      REVIEW OF SYSTEMS   Constitution: Negative. Negative for chills, fever and night sweats.   HENT: Negative for congestion and headaches.    Eyes: Negative for blurred vision, left vision loss and right vision loss.   Cardiovascular: Negative for chest pain and syncope.   Respiratory: Negative for cough and shortness of breath.    Endocrine: Negative for polydipsia, polyphagia and polyuria.   Hematologic/Lymphatic: Negative for bleeding problem. Does not bruise/bleed easily.   Skin: Negative for dry skin, itching and rash.   Musculoskeletal: Negative for falls. Positive for left knee pain.  Gastrointestinal: Negative for abdominal pain and bowel incontinence.   Genitourinary: Negative for bladder incontinence and nocturia.   Neurological: Negative for disturbances in coordination, loss of balance and seizures.   Psychiatric/Behavioral: Negative for depression. The patient does not have insomnia.    Allergic/Immunologic: Negative for hives and persistent infections.     PHYSICAL EXAMINATION    Vitals: BP (!) 162/90 (BP Location: Right arm, Patient Position: Sitting, BP Method: Small (Manual))   Ht 5' 2" (1.575 m)   Wt 55.8 kg (123 lb)   BMI 22.50 kg/m²     General: The patient appears active and healthy with no apparent physical problems.  No disturbance of mood or affect is demonstrated. Alert and Oriented.    HEENT: Eyes normal, pupils equally round, nose normal.    Resp: Equal and symmetrical chest rises. No wheezing    CV: Regular rate    Neck: Supple; nonpainful range of motion.    Extremities: no cyanosis, clubbing, edema, or diffuse swelling.  Palpable pulses, good capillary refill of the digits.  No coolness, discoloration, edema or obvious varicosities.    Skin: no lesions noted.    Lymphatic: no detected adenopathy in the upper or " lower extremities.    Neurologic: normal mental status, normal reflexes, normal gait and balance.  Patient is alert and oriented to person, place and time.  No flaccidity or spasticity is noted.  No motor or sensory deficits are noted.  Light touch is intact    Orthopaedic: Knee Musculoskeletal Exam  Gait    Antalgic: left    Assistive device: cane    Inspection    Left      Left knee inspection is normal.       Erythema: none        Effusion: none        Edema: none        Ecchymosis: none        Deformity: none        Alignment: normal        Previous incision: no previous incision    Palpation    Left      Increased warmth: none        Masses: none        Crepitus: patellofemoral        Tenderness: present          Lateral joint line: moderate          Lateral retinaculum: mild          LCL: mild          Medial joint line: moderate          Medial retinaculum: mild          Patella: mild          Patellar tendon: mild      Range of Motion    Left      Left knee range of motion is normal and full.      Strength    Left      Left knee strength is normal.       Extension: 5/5. Extension is not affected by pain.       Flexion: 5/5. Flexion is not affected by pain.      Instability    Left      Instability signs: none - stable      Varus stress grade: normal      Valgus stress grade: normal      Anterior drawer: normal      Posterior drawer: normal      Medial Carlton test: negative      Lateral Carlton test: negative      Lachman: negative    Neurovascular    Left      Left knee neurovascular exam is normal.        Pulses - DP: normal      Pulses - PT: normal    Special Signs    Left      Left knee special signs are normal.      Straight leg raise: normal      J sign: none        Patellar compression: moderate        Patellar apprehension: moderate        Patellar glide medial: 1      Patellar glide lateral: 1        IMAGING    X-Ray Knee 1 or 2 View Left  Order: [id number]  Status: Final result     Visible to  patient: Yes (not seen)     Next appt: 08/16/2023 at 11:00 AM in Lab ([facility], [facility])     Dx: Leg pain     0 Result Notes  Details    Reading Physician Reading Date Result Priority   Rich Anderson MD  [phone]  [phone] 5/26/2023 STAT     Narrative & Impression  EXAMINATION:  XR KNEE 1 OR 2 VIEW LEFT     CLINICAL HISTORY:  fall ;  Pain in leg, unspecified     TECHNIQUE:  Two views of the left knee     COMPARISON:  10/31/2022     FINDINGS:  No radiographic evidence of acute displaced fracture or dislocation of the left knee.  There is mild narrowing of the medial and lateral tibiofemoral compartments.  No significant suprapatellar joint effusion appreciated.  There are prominent vascular calcifications noted.     Impression:     No radiographic evidence of acute displaced fracture or dislocation of the left knee.        Electronically signed by: Rich Anderson MD  Date:                                            05/26/2023  Time:                                           22:53           Exam Ended: 05/26/23 22:42 Last Resulted: 05/26/23 22:53               X-rays including four views of the left knee were completed on 5/26/23. The images and report were reviewed by me personally.  There are no acute findings.  No fracture or dislocation.  No significant joint space narrowing or periarticular osteophyte formation.  There is a small area of calcification adjacent to the lateral femoral condyle.    IMPRESSION       ICD-10-CM ICD-9-CM   1. Primary osteoarthritis of left knee  M17.12 715.16       73-year-old female with recurrent left knee pain following patellar dislocation x 3.  She has had no recent instability events.  Her history and physical examination are consistent with patellofemoral osteoarthritis.  She has done well with injections in the past and would like to repeat the Orthovisc series.    MEDICATIONS PRESCRIBED      None    RECOMMENDATIONS     Surgical and non-surgical treatment options  for left knee osteoarthritis discussed  Left knee Orthovisc #2 of 3 injection given today  RTC in 1 week for Orthovisc #3 injection      Large Joint Aspiration/Injection: L knee    Date/Time: 9/11/2023 8:30 AM    Performed by: Armani Cai PA-C  Authorized by: Armani Cai PA-C    Consent Done?:  Yes (Verbal)  Indications:  Arthritis  Site marked: the procedure site was marked    Timeout: prior to procedure the correct patient, procedure, and site was verified    Prep: patient was prepped and draped in usual sterile fashion      Local anesthesia used?: Yes    Local anesthetic:  Co-phenylcaine spray    Details:  Needle Size:  22 G  Ultrasonic Guidance for needle placement?: No    Approach:  Anterolateral  Location:  Knee  Site:  L knee  Medications:  30 mg sodium hyaluronate (orthovisc) 30 mg/2 mL  Patient tolerance:  Patient tolerated the procedure well with no immediate complications        All questions were answered, pt will contact us for questions or concerns in the interim.

## 2023-09-13 NOTE — PROGRESS NOTES
"[facility]   Physical Therapy Treatment Note      Name: Jessica Floyd  Clinic Number: [id number]    Therapy Diagnosis:   Encounter Diagnoses   Name Primary?    Impaired functional mobility, balance, gait, and endurance Yes    Decreased range of motion with decreased strength        Physician: Armani Cai PA-C     Visit Date: 9/14/2023    Physician Orders: PT Eval and Treat -- Weight bear as tolerated and ROMAT - caution with posterior hip activities, Work on core strength  Medical Diagnosis from Referral: S72.002A (ICD-10-CM) - Left displaced femoral neck fracture   Evaluation Date: 8/2/2023  Authorization Period Expiration: 10/01/2023  Plan of Care Expiration: 11/2/2023  Progress Note Due: last 9/8/23 (next one 10/8/23)  Visit # / Visits authorized: 8/ 11  FOTO: 2/ 3        Time In: 8:45 am (late arrival)  Time Out: 9:15 am  Total Billable Time: 30 minutes        Precautions: Standard, 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  Response to previous treatment: "It was fine"  Functional change: improved ambulation due to knee injection     Pain: 7/10   Location: Right shoulder    Objective      Objective Measures updated for progress note.    9/8/2023    Lumbar AROM Pain/Dysfunction with Movement:   Flexion WFL Good spine movement, decreased hip flex >90 per MD protocol   Right rotation WFL No pain   Left rotation WFL No pain         Strength:  Hip Left  Right    Flexion 4/5 4/5   Abduction 4/5 4+/5   Adduction 4+/5 4+/5   Extension 4/5   4/5       Knee Left  Right   Extension 4+/5 4/5      Ankle Left  Right    Dorsiflexion 4+/5 4+/5   Plantarflexion 25 heel raises with SC - started fatiguing 25 heel raises with SC - started fatiguing         Knee Left  Right    Extension 0 0 " "  Flexion 130 140      Ankle Left  Right    Dorsiflexion 7 6   Plantarflexion 51 40      Hip ROM:  - R hip flexion: 110 (active)  - L hip flexion: 115 (active)      Flexibility: HS = fair (~ 85-90 bilaterally)    TUG: did not assess this visit due to patient request as she felt unstable today from lack of sleep  - 35 seconds with RW - initial eval     30 second sit-to-stand test (without UE support): (did not assess this visit due to patient request as she feels tired and off balance today)   0 without UE support; 5 with UE support - initial eval     Gait Analysis: Mod I with SPC, step through, bradykinetic with WBOS    Treatment     Jessica received the treatments listed in bold below:         therapeutic exercises to develop strength, endurance, ROM, flexibility, posture, and core stabilization for 20 minutes including:    Straight leg raise: x 20 bilaterally  HL 3-way clam 2 x 10 x RTB   Bridges x 15 (RTB at knees)  +LTR x 2'    Standing hip 3 way UE support on mat x 20 each direction   Supine hip abduction 1# 2 x 10     Hip Add ball squeeze 20 x 5"    Quad sets: 5"x15   Sidelying hip abduction: attempted but UA 2* pain in R shoulder (RTC tear)  Long arc quads: 30x   B HR  30x  Hip abd stand 15x 2  B       manual therapy techniques: Joint mobilizations and Manual traction were applied to the: L hip for  00 minutes, including:  Gentle PROM into flex L 20 sec x 3   Gentle inf glide np   PROM ER/ MR flex 90 gentle 20 sec x 3      therapeutic activities to improve functional performance for 10 minutes, including:  Supine marches 2 x 10 1# ankle weight   Sit<>stands: elevated no UE support x 10   STS regular height, unilateral UE support x 15  Bridges w/ ball squeeze 2 x 10     Heat applied to R shoulder during mat exercises (not billed)    Patient Education and Home Exercises       Education provided:   - pt educated to continue HEP at home    Written Home Exercises Provided: Patient instructed to cont prior HEP. " "Exercises were reviewed and Jessica was able to demonstrate them prior to the end of the session.  Jessica demonstrated good  understanding of the education provided. See EMR under Patient Instructions for exercises provided during therapy sessions    Assessment   Abbreviated session to day due to late arrival. Pt reported still she was still not feeling stable while standing, so exercises were performed on mat today. Worked on hip strengthening today while adding a stretch for the low back due to pt noting stiffness. Will address balance in a future visit when pt is feeling better.     Jessica Is progressing well towards her goals.   Pt prognosis is Good.     Pt will continue to benefit from skilled outpatient physical therapy to address the deficits listed in the problem list box on initial evaluation, provide pt/family education and to maximize pt's level of independence in the home and community environment.     Pt's spiritual, cultural and educational needs considered and pt agreeable to plan of care and goals.     Anticipated barriers to physical therapy: L shoulder OA, focus     Goals: updated 9/8/23  Short Term Goals (6 Weeks):  1. Pt able to stand >=30 minutes with ADLs with <3/10 pain. MET  2. Pt able to walk >=30 minutes with household chores with <3/10 pain. MET  3. Pt able to ascend/descend curb, independently, 1 handrail, with <3/10 pain. MET  4. Pt able to transfer in/out of chair 8x in 30 sec with 1 HHA to demonstrate improving functional strength MET  5. Pt to improve TUG test to >/=25" to demonstrate improving motor control/coordination with transfers.  Progressing, not met  6. Pt to improve static balance with OLVERA balance test >/=46/56 for stability with ADLs. Progressing, not met     Long Term Goals (12 Weeks):  1. Pt able to stand >=45 minutes with ADLs with <3/10 pain. MET  2. Pt able to walk >=45 minutes with household chores with <3/10 pain. MET  3. Pt able to ascend/descend 1 set of stairs, " "independently, 1 handrail, with <3/10 pain. MET  4. Pt able to transfer in/out of chair 10x in 30 sec with 1 HHA to demonstrate improving functional strength Progressing, not met  5. Pt to improve TUG test to >/=15" to demonstrate improving motor control/coordination with transfers.  Progressing, not met  6. Pt to improve static balance with OLVERA balance test >/=46/56 for stability with ADLs. Progressing, not met  7. Pt to demonstrate improved dynamic balance with DGI >/=19/24 for safety with ambulation. Progressing, not met    Plan   Plan of care Certification: 8/2/2023 to 11/2/2023.     Continue current POC progressing towards PT goals set. Work on LE strengthening and balance tasks.     Vlad Lopez III, PTA      "

## 2023-09-14 ENCOUNTER — CLINICAL SUPPORT (OUTPATIENT)
Dept: REHABILITATION | Facility: OTHER | Age: 73
End: 2023-09-14
Payer: MEDICARE

## 2023-09-14 DIAGNOSIS — Z74.09 IMPAIRED FUNCTIONAL MOBILITY, BALANCE, GAIT, AND ENDURANCE: Primary | ICD-10-CM

## 2023-09-14 DIAGNOSIS — R53.1 DECREASED RANGE OF MOTION WITH DECREASED STRENGTH: ICD-10-CM

## 2023-09-14 DIAGNOSIS — M25.60 DECREASED RANGE OF MOTION WITH DECREASED STRENGTH: ICD-10-CM

## 2023-09-14 PROCEDURE — 97110 THERAPEUTIC EXERCISES: CPT | Mod: HCNC,PN,CQ

## 2023-09-14 PROCEDURE — 97530 THERAPEUTIC ACTIVITIES: CPT | Mod: HCNC,PN,CQ

## 2023-09-15 ENCOUNTER — PATIENT MESSAGE (OUTPATIENT)
Dept: RHEUMATOLOGY | Facility: CLINIC | Age: 73
End: 2023-09-15
Payer: MEDICARE

## 2023-09-18 ENCOUNTER — PATIENT MESSAGE (OUTPATIENT)
Dept: RHEUMATOLOGY | Facility: CLINIC | Age: 73
End: 2023-09-18
Payer: MEDICARE

## 2023-09-18 ENCOUNTER — OFFICE VISIT (OUTPATIENT)
Dept: SPORTS MEDICINE | Facility: CLINIC | Age: 73
End: 2023-09-18
Payer: MEDICARE

## 2023-09-18 VITALS
BODY MASS INDEX: 22.63 KG/M2 | HEIGHT: 62 IN | WEIGHT: 123 LBS | DIASTOLIC BLOOD PRESSURE: 74 MMHG | SYSTOLIC BLOOD PRESSURE: 140 MMHG

## 2023-09-18 DIAGNOSIS — M10.9 GOUT, UNSPECIFIED CAUSE, UNSPECIFIED CHRONICITY, UNSPECIFIED SITE: Primary | ICD-10-CM

## 2023-09-18 DIAGNOSIS — M17.12 PRIMARY OSTEOARTHRITIS OF LEFT KNEE: Primary | ICD-10-CM

## 2023-09-18 PROCEDURE — 20610 DRAIN/INJ JOINT/BURSA W/O US: CPT | Mod: HCNC,LT,S$GLB, | Performed by: PHYSICIAN ASSISTANT

## 2023-09-18 PROCEDURE — 99999 PR PBB SHADOW E&M-EST. PATIENT-LVL III: CPT | Mod: PBBFAC,HCNC,, | Performed by: PHYSICIAN ASSISTANT

## 2023-09-18 PROCEDURE — 99999 PR PBB SHADOW E&M-EST. PATIENT-LVL III: ICD-10-PCS | Mod: PBBFAC,HCNC,, | Performed by: PHYSICIAN ASSISTANT

## 2023-09-18 PROCEDURE — 99499 UNLISTED E&M SERVICE: CPT | Mod: HCNC,S$GLB,, | Performed by: PHYSICIAN ASSISTANT

## 2023-09-18 PROCEDURE — 20610 LARGE JOINT ASPIRATION/INJECTION: L KNEE: ICD-10-PCS | Mod: HCNC,LT,S$GLB, | Performed by: PHYSICIAN ASSISTANT

## 2023-09-18 PROCEDURE — 99499 NO LOS: ICD-10-PCS | Mod: HCNC,S$GLB,, | Performed by: PHYSICIAN ASSISTANT

## 2023-09-18 NOTE — PROGRESS NOTES
ESTABLISHED PATIENT    History 9/18/2023:  Jessica is here for her left knee Orthovisc # 3 of 3 injection.    History 9/11/2023:  Jessica is here for her left knee Orthovisc # 2 of 3 injection.    History 8/28/2023:  Jessica is here for her left knee Orthovisc #1 of 3 injection.    History 8/14/2023:  Jessica returns here today for follow-up evaluation of her left knee.  She complains today of having recurrent pain over the last 2 weeks.  The previous Orthovisc series gave her significant relief and lasted approximately 8 months.    Of note, she is scheduled to see Dr. Emmanuel next month for her right shoulder and to discuss reverse shoulder replacement.    History 1/23/2023:  Jessica was seen for right shoulder pain.  She states her left knee pain has resolved since receiving the Orthovisc series.    History 12/5/2022:  Jessica returns here today for her 3rd left knee Orthovisc injection.    History 11/28/2022:  Jessica returns here today for her 2nd left knee Orthovisc injection.    History 11/14/2022:  Jessica returns here today for her 1st left knee Orthovisc injection.    History 10/31/2022:  73 y.o. Female with a history of left knee pain who reports having chronic patellar instability with 3 dislocations requiring manual reduction beginning when she was 11 years old.  She has had 2 arthroscopic procedures which were clean out procedures and not reconstructive.  She was previously seen by another provider and given that Euflexxa injections approximately 4 years ago.  She states these injections give her temporary relief.  She recently completed an extended course of physical therapy which also gave her relief.  Her biggest complaint today is anterior knee pain when sitting for any length of time.  She denies having any mechanical catching or popping sensations.  She has had no recent subluxation or instability events.        - mechanical symptoms, - instability    Is affecting ADLs.  Pain is 10/10 at it's  worst.        PAST MEDICAL HISTORY    Past Medical History:   Diagnosis Date    Allergic rhinitis     Amblyopia     Carotid stenosis     Coronary atherosclerosis     Outside [facility] 2014: 20% mLAD, 50% mCx, 20%, mRCA    Dyslipidemia     ESBL (extended spectrum beta-lactamase) producing bacteria infection     UTI    Hypertension     Nausea and vomiting 2017    Subarachnoid hemorrhage due to ruptured aneurysm        PAST SURGICAL HISTORY     Past Surgical History:   Procedure Laterality Date    ADENOIDECTOMY      ANTERIOR CRUCIATE LIGAMENT REPAIR      APPENDECTOMY      CATARACT EXTRACTION W/  INTRAOCULAR LENS IMPLANT Right 2022    Procedure: EXTRACTION, CATARACT, WITH IOL INSERTION;  Surgeon: Higinio Cristina MD;  Location: [facility];  Service: Ophthalmology;  Laterality: Right;    CATARACT EXTRACTION W/  INTRAOCULAR LENS IMPLANT Left 2022    Procedure: EXTRACTION, CATARACT, WITH IOL INSERTION;  Surgeon: Higinio Cristina MD;  Location: [facility];  Service: Ophthalmology;  Laterality: Left;    CEREBRAL ANEURYSM REPAIR      clip     SECTION      DENTAL SURGERY      EYE SURGERY Bilateral     cataract removal and lens implants    HIP ARTHROPLASTY Left 2023    Procedure: TOTAL HIP ARTHROPLASTY;  Surgeon: Juan Wan MD;  Location: [address];  Service: Orthopedics;  Laterality: Left;    TONSILLECTOMY         FAMILY HISTORY    Family History   Problem Relation Age of Onset    Hypertension Mother     Aneurysm Mother     Hypertension Father     Alcohol abuse Father     Kidney disease Brother     Heart disease Brother     Gout Brother     No Known Problems Daughter     No Known Problems Daughter     No Known Problems Daughter        SOCIAL HISTORY    Social History     Socioeconomic History    Marital status:    Occupational History    Occupation: Retired   Tobacco Use    Smoking status: Former     Current packs/day: 0.00     Average packs/day: 0.5 packs/day for 20.0  years (10.0 ttl pk-yrs)     Types: Cigarettes     Start date: 1979     Quit date: 1999     Years since quittin.7     Passive exposure: Past    Smokeless tobacco: Never   Substance and Sexual Activity    Alcohol use: Yes     Alcohol/week: 7.0 standard drinks of alcohol     Types: 7 Glasses of wine per week    Drug use: No    Sexual activity: Yes     Partners: Male   Social History Narrative    .  Has 3 grown daughters.   lives in [place].       Social Determinants of Health     Financial Resource Strain: Low Risk  (2023)    Overall Financial Resource Strain (CARDIA)     Difficulty of Paying Living Expenses: Not very hard   Food Insecurity: No Food Insecurity (2023)    Hunger Vital Sign     Worried About Running Out of Food in the Last Year: Never true     Ran Out of Food in the Last Year: Never true   Transportation Needs: No Transportation Needs (2023)    PRAPARE - Transportation     Lack of Transportation (Medical): No     Lack of Transportation (Non-Medical): No   Physical Activity: Sufficiently Active (2023)    Exercise Vital Sign     Days of Exercise per Week: 7 days     Minutes of Exercise per Session: 60 min   Recent Concern: Physical Activity - Insufficiently Active (2023)    Exercise Vital Sign     Days of Exercise per Week: 3 days     Minutes of Exercise per Session: 30 min   Stress: No Stress Concern Present (2023)    [place] [employer] - Occupational Stress Questionnaire     Feeling of Stress : Only a little   Social Connections: Socially Integrated (2023)    Social Connection and Isolation Panel [NHANES]     Frequency of Communication with Friends and Family: More than three times a week     Frequency of Social Gatherings with Friends and Family: More than three times a week     Attends Buddhist Services: 1 to 4 times per year     Active Member of Clubs or Organizations: Yes     Attends Club or Organization Meetings: More  than 4 times per year     Marital Status:    Housing Stability: Low Risk  (6/9/2023)    Housing Stability Vital Sign     Unable to Pay for Housing in the Last Year: No     Number of Places Lived in the Last Year: 1     Unstable Housing in the Last Year: No       MEDICATIONS      Current Outpatient Medications:     allopurinoL (ZYLOPRIM) 100 MG tablet, Take 2 tablets (200 mg total) by mouth once daily., Disp: 180 tablet, Rfl: 3    amLODIPine (NORVASC) 5 MG tablet, Take 1 tablet (5 mg total) by mouth once daily., Disp: 30 tablet, Rfl: 3    atorvastatin (LIPITOR) 80 MG tablet, TAKE 1 TABLET BY MOUTH EVERY DAY, Disp: 90 tablet, Rfl: 3    calcium-vitamin D3 (OS-WHITNEY 500 + D3) 500 mg-5 mcg (200 unit) per tablet, Take 2 tablets by mouth once daily., Disp: 15 tablet, Rfl: 0    carvediloL (COREG) 6.25 MG tablet, Take 1 tablet (6.25 mg total) by mouth 2 (two) times daily with meals., Disp: 60 tablet, Rfl: 11    celecoxib (CELEBREX) 200 MG capsule, Take 1 capsule (200 mg total) by mouth once daily., Disp: 30 capsule, Rfl: 1    clotrimazole (LOTRIMIN) 1 % Soln, Apply topically once daily. To affected toenails., Disp: 30 mL, Rfl: 6    DEPO-MEDROL 40 mg/mL injection, , Disp: , Rfl:     fluticasone propionate (FLONASE) 50 mcg/actuation nasal spray, SPRAY ONCE INTO EACH NOSTRIL ONCE DAILY, Disp: 16 mL, Rfl: 3    hydroCHLOROthiazide (HYDRODIURIL) 25 MG tablet, Take 1 tablet (25 mg total) by mouth once daily., Disp: 90 tablet, Rfl: 3    melatonin (MELATIN) 3 mg tablet, Take 2 tablets (6 mg total) by mouth nightly as needed for Insomnia., Disp: , Rfl: 0    MITIGARE 0.6 mg Cap, Take 1 capsule (0.6 mg total) by mouth once daily., Disp: 90 capsule, Rfl: 3    montelukast (SINGULAIR) 10 mg tablet, Take 1 tablet (10 mg total) by mouth every evening., Disp: 90 tablet, Rfl: 11    pantoprazole (PROTONIX) 40 MG tablet, TAKE 1 TABLET BY MOUTH EVERY DAY, Disp: 90 tablet, Rfl: 0    pregabalin (LYRICA) 75 MG capsule, Take 1 capsule (75 mg  "total) by mouth every evening., Disp: 30 capsule, Rfl: 6    vitamin D (VITAMIN D3) 50 mcg (2,000 unit) Tab, Take 1 tablet (2,000 Units total) by mouth once daily., Disp: , Rfl:     ALLERGIES     Review of patient's allergies indicates:  No Known Allergies      REVIEW OF SYSTEMS   Constitution: Negative. Negative for chills, fever and night sweats.   HENT: Negative for congestion and headaches.    Eyes: Negative for blurred vision, left vision loss and right vision loss.   Cardiovascular: Negative for chest pain and syncope.   Respiratory: Negative for cough and shortness of breath.    Endocrine: Negative for polydipsia, polyphagia and polyuria.   Hematologic/Lymphatic: Negative for bleeding problem. Does not bruise/bleed easily.   Skin: Negative for dry skin, itching and rash.   Musculoskeletal: Negative for falls. Positive for left knee pain.  Gastrointestinal: Negative for abdominal pain and bowel incontinence.   Genitourinary: Negative for bladder incontinence and nocturia.   Neurological: Negative for disturbances in coordination, loss of balance and seizures.   Psychiatric/Behavioral: Negative for depression. The patient does not have insomnia.    Allergic/Immunologic: Negative for hives and persistent infections.     PHYSICAL EXAMINATION    Vitals: BP (!) 140/74 (BP Location: Right arm, Patient Position: Sitting, BP Method: Small (Manual))   Ht 5' 2" (1.575 m)   Wt 55.8 kg (123 lb)   BMI 22.50 kg/m²     General: The patient appears active and healthy with no apparent physical problems.  No disturbance of mood or affect is demonstrated. Alert and Oriented.    HEENT: Eyes normal, pupils equally round, nose normal.    Resp: Equal and symmetrical chest rises. No wheezing    CV: Regular rate    Neck: Supple; nonpainful range of motion.    Extremities: no cyanosis, clubbing, edema, or diffuse swelling.  Palpable pulses, good capillary refill of the digits.  No coolness, discoloration, edema or obvious " varicosities.    Skin: no lesions noted.    Lymphatic: no detected adenopathy in the upper or lower extremities.    Neurologic: normal mental status, normal reflexes, normal gait and balance.  Patient is alert and oriented to person, place and time.  No flaccidity or spasticity is noted.  No motor or sensory deficits are noted.  Light touch is intact    Orthopaedic: Knee Musculoskeletal Exam  Gait    Antalgic: left    Assistive device: cane    Inspection    Left      Left knee inspection is normal.       Erythema: none        Effusion: none        Edema: none        Ecchymosis: none        Deformity: none        Alignment: normal        Previous incision: no previous incision    Palpation    Left      Increased warmth: none        Masses: none        Crepitus: patellofemoral        Tenderness: present          Lateral joint line: moderate          Lateral retinaculum: mild          LCL: mild          Medial joint line: moderate          Medial retinaculum: mild          Patella: mild          Patellar tendon: mild      Range of Motion    Left      Left knee range of motion is normal and full.      Strength    Left      Left knee strength is normal.       Extension: 5/5. Extension is not affected by pain.       Flexion: 5/5. Flexion is not affected by pain.      Instability    Left      Instability signs: none - stable      Varus stress grade: normal      Valgus stress grade: normal      Anterior drawer: normal      Posterior drawer: normal      Medial Carlton test: negative      Lateral Carlton test: negative      Lachman: negative    Neurovascular    Left      Left knee neurovascular exam is normal.        Pulses - DP: normal      Pulses - PT: normal    Special Signs    Left      Left knee special signs are normal.      Straight leg raise: normal      J sign: none        Patellar compression: moderate        Patellar apprehension: moderate        Patellar glide medial: 1      Patellar glide lateral:  1        IMAGING    X-Ray Knee 1 or 2 View Left  Order: [id number]  Status: Final result     Visible to patient: Yes (not seen)     Next appt: 08/16/2023 at 11:00 AM in Lab ([email or web address])     Dx: Leg pain     0 Result Notes  Details    Reading Physician Reading Date Result Priority   Rich Anderson MD  [phone]  [phone] 5/26/2023 STAT     Narrative & Impression  EXAMINATION:  XR KNEE 1 OR 2 VIEW LEFT     CLINICAL HISTORY:  fall ;  Pain in leg, unspecified     TECHNIQUE:  Two views of the left knee     COMPARISON:  10/31/2022     FINDINGS:  No radiographic evidence of acute displaced fracture or dislocation of the left knee.  There is mild narrowing of the medial and lateral tibiofemoral compartments.  No significant suprapatellar joint effusion appreciated.  There are prominent vascular calcifications noted.     Impression:     No radiographic evidence of acute displaced fracture or dislocation of the left knee.        Electronically signed by: Rich Anderson MD  Date:                                            05/26/2023  Time:                                           22:53           Exam Ended: 05/26/23 22:42 Last Resulted: 05/26/23 22:53               X-rays including four views of the left knee were completed on 5/26/23. The images and report were reviewed by me personally.  There are no acute findings.  No fracture or dislocation.  No significant joint space narrowing or periarticular osteophyte formation.  There is a small area of calcification adjacent to the lateral femoral condyle.    IMPRESSION       ICD-10-CM ICD-9-CM   1. Primary osteoarthritis of left knee  M17.12 715.16       73-year-old female with recurrent left knee pain following patellar dislocation x 3.  She has had no recent instability events.  Her history and physical examination are consistent with patellofemoral osteoarthritis.  She has done well with injections in the past and would like to repeat the Orthovisc  series.    MEDICATIONS PRESCRIBED      None    RECOMMENDATIONS     Surgical and non-surgical treatment options for left knee osteoarthritis discussed  Left knee Orthovisc #3 of 3 injection given today  RTC as needed.      Large Joint Aspiration/Injection: L knee    Date/Time: 9/18/2023 8:30 AM    Performed by: Armani Cai PA-C  Authorized by: Armani Cai PA-C    Consent Done?:  Yes (Verbal)  Indications:  Arthritis  Site marked: the procedure site was marked    Timeout: prior to procedure the correct patient, procedure, and site was verified    Prep: patient was prepped and draped in usual sterile fashion      Local anesthesia used?: Yes    Local anesthetic:  Co-phenylcaine spray    Details:  Needle Size:  22 G  Ultrasonic Guidance for needle placement?: No    Approach:  Anterolateral  Location:  Knee  Site:  L knee  Medications:  30 mg sodium hyaluronate (orthovisc) 30 mg/2 mL  Patient tolerance:  Patient tolerated the procedure well with no immediate complications        All questions were answered, pt will contact us for questions or concerns in the interim.

## 2023-09-19 ENCOUNTER — INFUSION (OUTPATIENT)
Dept: INFUSION THERAPY | Facility: OTHER | Age: 73
End: 2023-09-19
Attending: STUDENT IN AN ORGANIZED HEALTH CARE EDUCATION/TRAINING PROGRAM
Payer: MEDICARE

## 2023-09-19 VITALS
TEMPERATURE: 98 F | SYSTOLIC BLOOD PRESSURE: 144 MMHG | HEART RATE: 81 BPM | DIASTOLIC BLOOD PRESSURE: 75 MMHG | OXYGEN SATURATION: 95 % | RESPIRATION RATE: 18 BRPM

## 2023-09-19 DIAGNOSIS — D50.0 IRON DEFICIENCY ANEMIA SECONDARY TO BLOOD LOSS (CHRONIC): Primary | ICD-10-CM

## 2023-09-19 PROCEDURE — 63600175 PHARM REV CODE 636 W HCPCS: Mod: HCNC | Performed by: STUDENT IN AN ORGANIZED HEALTH CARE EDUCATION/TRAINING PROGRAM

## 2023-09-19 PROCEDURE — 96365 THER/PROPH/DIAG IV INF INIT: CPT | Mod: HCNC

## 2023-09-19 PROCEDURE — 25000003 PHARM REV CODE 250: Mod: HCNC | Performed by: STUDENT IN AN ORGANIZED HEALTH CARE EDUCATION/TRAINING PROGRAM

## 2023-09-19 PROCEDURE — 96366 THER/PROPH/DIAG IV INF ADDON: CPT | Mod: HCNC

## 2023-09-19 RX ORDER — EPINEPHRINE 0.3 MG/.3ML
0.3 INJECTION SUBCUTANEOUS ONCE AS NEEDED
Status: DISCONTINUED | OUTPATIENT
Start: 2023-09-19 | End: 2023-09-19 | Stop reason: HOSPADM

## 2023-09-19 RX ORDER — COLCHICINE 0.6 MG/1
0.6 TABLET ORAL DAILY
Qty: 30 TABLET | Refills: 11 | Status: ON HOLD | OUTPATIENT
Start: 2023-09-19 | End: 2023-10-31 | Stop reason: SDUPTHER

## 2023-09-19 RX ORDER — EPINEPHRINE 0.3 MG/.3ML
0.3 INJECTION SUBCUTANEOUS ONCE AS NEEDED
Status: CANCELLED | OUTPATIENT
Start: 2023-09-26

## 2023-09-19 RX ORDER — SODIUM CHLORIDE 0.9 % (FLUSH) 0.9 %
10 SYRINGE (ML) INJECTION
Status: DISCONTINUED | OUTPATIENT
Start: 2023-09-19 | End: 2023-09-19 | Stop reason: HOSPADM

## 2023-09-19 RX ORDER — HEPARIN 100 UNIT/ML
500 SYRINGE INTRAVENOUS
Status: DISCONTINUED | OUTPATIENT
Start: 2023-09-19 | End: 2023-09-19 | Stop reason: HOSPADM

## 2023-09-19 RX ORDER — DIPHENHYDRAMINE HYDROCHLORIDE 50 MG/ML
50 INJECTION INTRAMUSCULAR; INTRAVENOUS ONCE AS NEEDED
Status: CANCELLED | OUTPATIENT
Start: 2023-09-26

## 2023-09-19 RX ORDER — METHYLPREDNISOLONE 4 MG/1
TABLET ORAL
Qty: 1 EACH | Refills: 0 | Status: SHIPPED | OUTPATIENT
Start: 2023-09-19 | End: 2023-10-24

## 2023-09-19 RX ORDER — METHYLPREDNISOLONE SOD SUCC 125 MG
125 VIAL (EA) INJECTION ONCE AS NEEDED
Status: DISCONTINUED | OUTPATIENT
Start: 2023-09-19 | End: 2023-09-19 | Stop reason: HOSPADM

## 2023-09-19 RX ORDER — HEPARIN 100 UNIT/ML
500 SYRINGE INTRAVENOUS
Status: CANCELLED | OUTPATIENT
Start: 2023-09-26

## 2023-09-19 RX ORDER — SODIUM CHLORIDE 0.9 % (FLUSH) 0.9 %
10 SYRINGE (ML) INJECTION
Status: CANCELLED | OUTPATIENT
Start: 2023-09-26

## 2023-09-19 RX ORDER — DIPHENHYDRAMINE HYDROCHLORIDE 50 MG/ML
50 INJECTION INTRAMUSCULAR; INTRAVENOUS ONCE AS NEEDED
Status: DISCONTINUED | OUTPATIENT
Start: 2023-09-19 | End: 2023-09-19 | Stop reason: HOSPADM

## 2023-09-19 RX ORDER — METHYLPREDNISOLONE SOD SUCC 125 MG
125 VIAL (EA) INJECTION ONCE AS NEEDED
Status: CANCELLED | OUTPATIENT
Start: 2023-09-26

## 2023-09-19 RX ADMIN — IRON SUCROSE 300 MG: 20 INJECTION, SOLUTION INTRAVENOUS at 09:09

## 2023-09-19 RX ADMIN — SODIUM CHLORIDE: 9 INJECTION, SOLUTION INTRAVENOUS at 09:09

## 2023-09-20 ENCOUNTER — OFFICE VISIT (OUTPATIENT)
Dept: PRIMARY CARE CLINIC | Facility: CLINIC | Age: 73
End: 2023-09-20
Attending: FAMILY MEDICINE
Payer: MEDICARE

## 2023-09-20 VITALS
OXYGEN SATURATION: 97 % | HEART RATE: 88 BPM | SYSTOLIC BLOOD PRESSURE: 138 MMHG | DIASTOLIC BLOOD PRESSURE: 86 MMHG | HEIGHT: 62 IN | WEIGHT: 121.81 LBS | BODY MASS INDEX: 22.41 KG/M2

## 2023-09-20 DIAGNOSIS — R09.82 POSTNASAL DRIP: Primary | ICD-10-CM

## 2023-09-20 LAB
CTP QC/QA: YES
SARS-COV-2 AG RESP QL IA.RAPID: NEGATIVE

## 2023-09-20 PROCEDURE — 3288F PR FALLS RISK ASSESSMENT DOCUMENTED: ICD-10-PCS | Mod: HCNC,CPTII,S$GLB, | Performed by: FAMILY MEDICINE

## 2023-09-20 PROCEDURE — 99214 OFFICE O/P EST MOD 30 MIN: CPT | Mod: HCNC,S$GLB,, | Performed by: FAMILY MEDICINE

## 2023-09-20 PROCEDURE — 99999 PR PBB SHADOW E&M-EST. PATIENT-LVL IV: CPT | Mod: PBBFAC,HCNC,, | Performed by: FAMILY MEDICINE

## 2023-09-20 PROCEDURE — 1126F PR PAIN SEVERITY QUANTIFIED, NO PAIN PRESENT: ICD-10-PCS | Mod: HCNC,CPTII,S$GLB, | Performed by: FAMILY MEDICINE

## 2023-09-20 PROCEDURE — 1159F MED LIST DOCD IN RCRD: CPT | Mod: HCNC,CPTII,S$GLB, | Performed by: FAMILY MEDICINE

## 2023-09-20 PROCEDURE — 1101F PT FALLS ASSESS-DOCD LE1/YR: CPT | Mod: HCNC,CPTII,S$GLB, | Performed by: FAMILY MEDICINE

## 2023-09-20 PROCEDURE — 1160F PR REVIEW ALL MEDS BY PRESCRIBER/CLIN PHARMACIST DOCUMENTED: ICD-10-PCS | Mod: HCNC,CPTII,S$GLB, | Performed by: FAMILY MEDICINE

## 2023-09-20 PROCEDURE — 87811 SARS CORONAVIRUS 2 ANTIGEN POCT, MANUAL READ: ICD-10-PCS | Mod: QW,HCNC,S$GLB, | Performed by: FAMILY MEDICINE

## 2023-09-20 PROCEDURE — 1160F RVW MEDS BY RX/DR IN RCRD: CPT | Mod: HCNC,CPTII,S$GLB, | Performed by: FAMILY MEDICINE

## 2023-09-20 PROCEDURE — 3288F FALL RISK ASSESSMENT DOCD: CPT | Mod: HCNC,CPTII,S$GLB, | Performed by: FAMILY MEDICINE

## 2023-09-20 PROCEDURE — 99214 PR OFFICE/OUTPT VISIT, EST, LEVL IV, 30-39 MIN: ICD-10-PCS | Mod: HCNC,S$GLB,, | Performed by: FAMILY MEDICINE

## 2023-09-20 PROCEDURE — 1159F PR MEDICATION LIST DOCUMENTED IN MEDICAL RECORD: ICD-10-PCS | Mod: HCNC,CPTII,S$GLB, | Performed by: FAMILY MEDICINE

## 2023-09-20 PROCEDURE — 99999 PR PBB SHADOW E&M-EST. PATIENT-LVL IV: ICD-10-PCS | Mod: PBBFAC,HCNC,, | Performed by: FAMILY MEDICINE

## 2023-09-20 PROCEDURE — 3008F PR BODY MASS INDEX (BMI) DOCUMENTED: ICD-10-PCS | Mod: HCNC,CPTII,S$GLB, | Performed by: FAMILY MEDICINE

## 2023-09-20 PROCEDURE — 3044F HG A1C LEVEL LT 7.0%: CPT | Mod: HCNC,CPTII,S$GLB, | Performed by: FAMILY MEDICINE

## 2023-09-20 PROCEDURE — 3075F PR MOST RECENT SYSTOLIC BLOOD PRESS GE 130-139MM HG: ICD-10-PCS | Mod: HCNC,CPTII,S$GLB, | Performed by: FAMILY MEDICINE

## 2023-09-20 PROCEDURE — 87811 SARS-COV-2 COVID19 W/OPTIC: CPT | Mod: QW,HCNC,S$GLB, | Performed by: FAMILY MEDICINE

## 2023-09-20 PROCEDURE — 1126F AMNT PAIN NOTED NONE PRSNT: CPT | Mod: HCNC,CPTII,S$GLB, | Performed by: FAMILY MEDICINE

## 2023-09-20 PROCEDURE — 3079F DIAST BP 80-89 MM HG: CPT | Mod: HCNC,CPTII,S$GLB, | Performed by: FAMILY MEDICINE

## 2023-09-20 PROCEDURE — 3044F PR MOST RECENT HEMOGLOBIN A1C LEVEL <7.0%: ICD-10-PCS | Mod: HCNC,CPTII,S$GLB, | Performed by: FAMILY MEDICINE

## 2023-09-20 PROCEDURE — 3079F PR MOST RECENT DIASTOLIC BLOOD PRESSURE 80-89 MM HG: ICD-10-PCS | Mod: HCNC,CPTII,S$GLB, | Performed by: FAMILY MEDICINE

## 2023-09-20 PROCEDURE — 1101F PR PT FALLS ASSESS DOC 0-1 FALLS W/OUT INJ PAST YR: ICD-10-PCS | Mod: HCNC,CPTII,S$GLB, | Performed by: FAMILY MEDICINE

## 2023-09-20 PROCEDURE — 3008F BODY MASS INDEX DOCD: CPT | Mod: HCNC,CPTII,S$GLB, | Performed by: FAMILY MEDICINE

## 2023-09-20 PROCEDURE — 3075F SYST BP GE 130 - 139MM HG: CPT | Mod: HCNC,CPTII,S$GLB, | Performed by: FAMILY MEDICINE

## 2023-09-20 RX ORDER — FLUTICASONE PROPIONATE 50 MCG
1 SPRAY, SUSPENSION (ML) NASAL DAILY
Qty: 16 G | Refills: 0 | Status: CANCELLED | OUTPATIENT
Start: 2023-09-20

## 2023-09-20 RX ORDER — LEVOCETIRIZINE DIHYDROCHLORIDE 5 MG/1
5 TABLET, FILM COATED ORAL NIGHTLY
Qty: 30 TABLET | Refills: 0 | Status: CANCELLED | OUTPATIENT
Start: 2023-09-20 | End: 2024-09-19

## 2023-09-20 RX ORDER — BENZONATATE 200 MG/1
200 CAPSULE ORAL 3 TIMES DAILY PRN
Qty: 30 CAPSULE | Refills: 0 | Status: SHIPPED | OUTPATIENT
Start: 2023-09-20 | End: 2023-10-16 | Stop reason: SDUPTHER

## 2023-09-20 NOTE — PROGRESS NOTES
FAMILY MEDICINE  [facility]    Reason for visit:   Chief Complaint   Patient presents with    post nasal     Cough       HPI: Jessica Floyd is a 73 y.o. female  - presents as a new patient for concerns for postnasal drip. PCP Effie Larson MD    Jessica Floyd reports that she does have a long history of chronic rhinitis and takes Flonase, montelukast and uses her nasal saline rinses regularly. She reports that symptoms worsened yesterday after her IV iron infusion. She reports drainage is clear but she had an episode of 1 blood drainage which she blew her nose last night. No recurrence since. She is concerned due to a history of severe nose bleed 2/2023 that required emergency cauterization. She reports that her ENT suspect it was related with cryosurgery for her basal cell carcinoma of the right outer nare. Since then she has not had any nose bleeds.     She reports that cough kept her up all night. She notes that her PCP typically gives her Tessalon pearls 200 mg then 100 mg and if helps a lot.           Review of Systems   All other systems reviewed and are negative.      Social History     Social History Narrative    .  Has 3 grown daughters.   lives in [place].           ALLERGIES:   Review of patient's allergies indicates:  No Known Allergies    MEDS:   Current Outpatient Medications on File Prior to Visit   Medication Sig Dispense Refill Last Dose    allopurinoL (ZYLOPRIM) 100 MG tablet Take 2 tablets (200 mg total) by mouth once daily. 180 tablet 3 Taking    amLODIPine (NORVASC) 5 MG tablet Take 1 tablet (5 mg total) by mouth once daily. 30 tablet 3 Taking    atorvastatin (LIPITOR) 80 MG tablet TAKE 1 TABLET BY MOUTH EVERY DAY 90 tablet 3 Taking    calcium-vitamin D3 (OS-WHITNEY 500 + D3) 500 mg-5 mcg (200 unit) per tablet Take 2 tablets by mouth once daily. 15 tablet 0 Taking    carvediloL (COREG) 6.25 MG tablet Take 1 tablet (6.25 mg total) by mouth 2 (two) times daily  "with meals. 60 tablet 11 Taking    celecoxib (CELEBREX) 200 MG capsule Take 1 capsule (200 mg total) by mouth once daily. 30 capsule 1 Taking    clotrimazole (LOTRIMIN) 1 % Soln Apply topically once daily. To affected toenails. 30 mL 6 Taking    colchicine, gout, (COLCRYS) 0.6 mg tablet Take 1 tablet (0.6 mg total) by mouth once daily. 30 tablet 11 Taking    fluticasone propionate (FLONASE) 50 mcg/actuation nasal spray SPRAY ONCE INTO EACH NOSTRIL ONCE DAILY 16 mL 3 Taking    hydroCHLOROthiazide (HYDRODIURIL) 25 MG tablet Take 1 tablet (25 mg total) by mouth once daily. 90 tablet 3 Taking    melatonin (MELATIN) 3 mg tablet Take 2 tablets (6 mg total) by mouth nightly as needed for Insomnia.  0 Taking    methylPREDNISolone (MEDROL DOSEPACK) 4 mg tablet use as directed 1 each 0 Taking    MITIGARE 0.6 mg Cap Take 1 capsule (0.6 mg total) by mouth once daily. 90 capsule 3 Taking    montelukast (SINGULAIR) 10 mg tablet Take 1 tablet (10 mg total) by mouth every evening. 90 tablet 11 Taking    pantoprazole (PROTONIX) 40 MG tablet TAKE 1 TABLET BY MOUTH EVERY DAY 90 tablet 0 Taking    pregabalin (LYRICA) 75 MG capsule Take 1 capsule (75 mg total) by mouth every evening. 30 capsule 6 Taking    vitamin D (VITAMIN D3) 50 mcg (2,000 unit) Tab Take 1 tablet (2,000 Units total) by mouth once daily.   Taking    [DISCONTINUED] DEPO-MEDROL 40 mg/mL injection    Taking       Vital signs:   Vitals:    09/20/23 1307   BP: 138/86   Pulse: 88   SpO2: 97%   Weight: 55.3 kg (121 lb 12.9 oz)   Height: 5' 2" (1.575 m)     Body mass index is 22.28 kg/m².    PHYSICAL EXAM:     Physical Exam  Constitutional:       General: She is not in acute distress.  HENT:      Right Ear: Tympanic membrane and ear canal normal.      Left Ear: Tympanic membrane and ear canal normal.      Nose: Congestion and rhinorrhea present. No mucosal edema. Rhinorrhea is clear.      Right Nostril: No epistaxis.      Left Nostril: No epistaxis.      Right Sinus: No " maxillary sinus tenderness or frontal sinus tenderness.      Left Sinus: No maxillary sinus tenderness or frontal sinus tenderness.   Cardiovascular:      Rate and Rhythm: Normal rate and regular rhythm.   Pulmonary:      Effort: Pulmonary effort is normal. No respiratory distress.      Breath sounds: No wheezing, rhonchi or rales.   Neurological:      Mental Status: She is alert.   Psychiatric:         Speech: Speech normal.           PERTINENT RESULTS:   Covid-19 negative    ASSESSMENT/PLAN:    1. Postnasal drip  Overview:  - with chronic allergic rhinitis  - exam benign: lung clear and nasal drainage clear  - Covid-19 testing negative  - supportive care  - she reports that typically her PCP prescribes Tessalon pearls 200mg TID as needed and it helps her    Orders:  -     SARS Coronavirus 2 Antigen, POCT Manual Read  -     benzonatate (TESSALON) 200 MG capsule; Take 1 capsule (200 mg total) by mouth 3 (three) times daily as needed for Cough.  Dispense: 30 capsule; Refill: 0          Vaccines recommended: flu and covid-19    Follow-up 1-2 weeks with PCP if not better    This note is dictated using the M*Modal Fluency Direct word recognition program. There are word recognition mistakes that are occasionally missed on review.    Dr. Makayla Stock D.O.   Family Medicine

## 2023-09-21 ENCOUNTER — DOCUMENTATION ONLY (OUTPATIENT)
Dept: ORTHOPEDICS | Facility: CLINIC | Age: 73
End: 2023-09-21
Payer: MEDICARE

## 2023-09-21 ENCOUNTER — PATIENT MESSAGE (OUTPATIENT)
Dept: ORTHOPEDICS | Facility: CLINIC | Age: 73
End: 2023-09-21
Payer: MEDICARE

## 2023-09-22 ENCOUNTER — TELEPHONE (OUTPATIENT)
Dept: PREADMISSION TESTING | Facility: HOSPITAL | Age: 73
End: 2023-09-22
Payer: MEDICARE

## 2023-09-25 ENCOUNTER — TELEPHONE (OUTPATIENT)
Dept: PREADMISSION TESTING | Facility: HOSPITAL | Age: 73
End: 2023-09-25
Payer: MEDICARE

## 2023-09-26 DIAGNOSIS — M19.90 ARTHRITIS: ICD-10-CM

## 2023-09-26 DIAGNOSIS — M25.511 RIGHT SHOULDER PAIN, UNSPECIFIED CHRONICITY: Primary | ICD-10-CM

## 2023-09-27 ENCOUNTER — CLINICAL SUPPORT (OUTPATIENT)
Dept: REHABILITATION | Facility: OTHER | Age: 73
End: 2023-09-27
Payer: MEDICARE

## 2023-09-27 DIAGNOSIS — Z74.09 IMPAIRED FUNCTIONAL MOBILITY, BALANCE, GAIT, AND ENDURANCE: Primary | ICD-10-CM

## 2023-09-27 DIAGNOSIS — R53.1 DECREASED RANGE OF MOTION WITH DECREASED STRENGTH: ICD-10-CM

## 2023-09-27 DIAGNOSIS — M25.60 DECREASED RANGE OF MOTION WITH DECREASED STRENGTH: ICD-10-CM

## 2023-09-27 PROCEDURE — 97110 THERAPEUTIC EXERCISES: CPT | Mod: HCNC,PN

## 2023-09-27 PROCEDURE — 97530 THERAPEUTIC ACTIVITIES: CPT | Mod: HCNC,PN

## 2023-09-27 NOTE — PROGRESS NOTES
"[facility]   Physical Therapy Treatment Note      Name: Jessica Floyd  Clinic Number: [id number]    Therapy Diagnosis:   Encounter Diagnoses   Name Primary?    Impaired functional mobility, balance, gait, and endurance Yes    Decreased range of motion with decreased strength          Physician: Armani Cai PA-C     Visit Date: 9/27/2023    Physician Orders: PT Eval and Treat -- Weight bear as tolerated and ROMAT - caution with posterior hip activities, Work on core strength  Medical Diagnosis from Referral: S72.002A (ICD-10-CM) - Left displaced femoral neck fracture   Evaluation Date: 8/2/2023  Authorization Period Expiration: 10/01/2023  Plan of Care Expiration: 11/2/2023  Progress Note Due: last 9/8/23 (next one 10/8/23)  Visit # / Visits authorized: 9 / 11  FOTO: 2/ 3        Time In: 1: 55 pm (late arrival)  Time Out: 2:30 pm   Total Billable Time: 35 minutes        Precautions: Standard, 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 She also got an injection in her L knee and it hasn't been bothering her.     She was compliant with home exercise program.  Response to previous treatment: "It was fine"  Functional change: improved ambulation due to knee injection     Pain: 0/10   Location: L Hip     Objective      Objective Measures updated for progress note.    9/8/2023    Lumbar AROM Pain/Dysfunction with Movement:   Flexion WFL Good spine movement, decreased hip flex >90 per MD protocol   Right rotation WFL No pain   Left rotation WFL No pain         Strength:  Hip Left  Right    Flexion 4/5 4/5   Abduction 4/5 4+/5   Adduction 4+/5 4+/5   Extension 4/5   4/5       Knee Left  " "Right   Extension 4+/5 4/5      Ankle Left  Right    Dorsiflexion 4+/5 4+/5   Plantarflexion 25 heel raises with SC - started fatiguing 25 heel raises with SC - started fatiguing         Knee Left  Right    Extension 0 0   Flexion 130 140      Ankle Left  Right    Dorsiflexion 7 6   Plantarflexion 51 40      Hip ROM:  - R hip flexion: 110 (active)  - L hip flexion: 115 (active)      Flexibility: HS = fair (~ 85-90 bilaterally)    TUG: did not assess this visit due to patient request as she felt unstable today from lack of sleep  - 35 seconds with RW - initial eval     30 second sit-to-stand test (without UE support): (did not assess this visit due to patient request as she feels tired and off balance today)   0 without UE support; 5 with UE support - initial eval     Gait Analysis: Mod I with SPC, step through, bradykinetic with WBOS    Treatment     Jessica received the treatments listed in bold below:         therapeutic exercises to develop strength, endurance, ROM, flexibility, posture, and core stabilization for 15 minutes including:  Standing hip abduction 1# weight x 20  Standing hip extension 1# weight x 20   Seated hip adduction ball squeeze x 30 x 3"  Seated hip abduction x 20 both sides    +seated marches RTB x 20     Straight leg raise: x 20 bilaterally  HL 3-way clam 2 x 10 x RTB   Bridges x 15 (RTB at knees)  +LTR x 2'    Standing hip 3 way UE support on mat x 20 each direction   Supine hip abduction 1# 2 x 10   Hip Add ball squeeze 20 x 5"    Quad sets: 5"x15   Sidelying hip abduction: attempted but UA 2* pain in R shoulder (RTC tear)  Long arc quads: 30x   B HR  30x  Hip abd stand 15x 2  B         manual therapy techniques: Joint mobilizations and Manual traction were applied to the: L hip for  00 minutes, including:  Gentle PROM into flex L 20 sec x 3   Gentle inf glide np   PROM ER/ MR flex 90 gentle 20 sec x 3        therapeutic activities to improve functional performance for 20 minutes, " including:  + standing marches in // bars 2 x 1 min   + mini squats 3 x 10   STS regular height, unilateral UE support x 15  + discussed patients status with progress in hip - agreed to complete a couple more sessions to improve LE strength prior to having shoulder surgery per pt request.     Supine marches 2 x 10 1# ankle weight   Sit<>stands: elevated no UE support x 10   Bridges w/ ball squeeze 2 x 10       Heat applied to R shoulder during mat exercises (not billed)    Patient Education and Home Exercises       Education provided:   - pt educated on current progress with PT; discussed a couple more visits to focus on LE strength.     Written Home Exercises Provided: Patient instructed to cont prior HEP. Exercises were reviewed and Jessica was able to demonstrate them prior to the end of the session.  Jessica demonstrated good  understanding of the education provided. See EMR under Patient Instructions for exercises provided during therapy sessions    Assessment     Jessica demonstrated good tolerance to entirety of session focusing on LE strength and tolerance with standing. Patient performed series of exercises in // bars with marches, mini squats and standing hip abduction/ext requiring UE support to maintain upright position. Patient did voice appropriate muscle fatigue and recruitment with all activities. Discussed potential d/c soon as pt is demonstrating good progress with hip ROM and strength; pt voiced desire to continue with several more visits to increase strength a little more prior to her shoulder surgery. Will continue to progress standing strengthening exercises to improve safety with ambulation in community.       Jessica Is progressing well towards her goals.   Pt prognosis is Good.     Pt will continue to benefit from skilled outpatient physical therapy to address the deficits listed in the problem list box on initial evaluation, provide pt/family education and to maximize pt's level of independence  "in the home and community environment.     Pt's spiritual, cultural and educational needs considered and pt agreeable to plan of care and goals.     Anticipated barriers to physical therapy: L shoulder OA, focus     Goals: updated 9/8/23  Short Term Goals (6 Weeks):  1. Pt able to stand >=30 minutes with ADLs with <3/10 pain. MET  2. Pt able to walk >=30 minutes with household chores with <3/10 pain. MET  3. Pt able to ascend/descend curb, independently, 1 handrail, with <3/10 pain. MET  4. Pt able to transfer in/out of chair 8x in 30 sec with 1 HHA to demonstrate improving functional strength MET  5. Pt to improve TUG test to >/=25" to demonstrate improving motor control/coordination with transfers.  Progressing, not met  6. Pt to improve static balance with OLVERA balance test >/=46/56 for stability with ADLs. Progressing, not met     Long Term Goals (12 Weeks):  1. Pt able to stand >=45 minutes with ADLs with <3/10 pain. MET  2. Pt able to walk >=45 minutes with household chores with <3/10 pain. MET  3. Pt able to ascend/descend 1 set of stairs, independently, 1 handrail, with <3/10 pain. MET  4. Pt able to transfer in/out of chair 10x in 30 sec with 1 HHA to demonstrate improving functional strength Progressing, not met  5. Pt to improve TUG test to >/=15" to demonstrate improving motor control/coordination with transfers.  Progressing, not met  6. Pt to improve static balance with OLVERA balance test >/=46/56 for stability with ADLs. Progressing, not met  7. Pt to demonstrate improved dynamic balance with DGI >/=19/24 for safety with ambulation. Progressing, not met    Plan   Plan of care Certification: 8/2/2023 to 11/2/2023.     Continue current POC progressing towards PT goals focusing on LE strengthening activities in standing and seated position.     Vivien Choi, PT      "

## 2023-09-28 ENCOUNTER — INFUSION (OUTPATIENT)
Dept: INFUSION THERAPY | Facility: OTHER | Age: 73
End: 2023-09-28
Attending: STUDENT IN AN ORGANIZED HEALTH CARE EDUCATION/TRAINING PROGRAM
Payer: MEDICARE

## 2023-09-28 VITALS
SYSTOLIC BLOOD PRESSURE: 155 MMHG | HEART RATE: 118 BPM | RESPIRATION RATE: 18 BRPM | DIASTOLIC BLOOD PRESSURE: 87 MMHG | TEMPERATURE: 98 F | OXYGEN SATURATION: 97 %

## 2023-09-28 DIAGNOSIS — D50.0 IRON DEFICIENCY ANEMIA SECONDARY TO BLOOD LOSS (CHRONIC): Primary | ICD-10-CM

## 2023-09-28 PROCEDURE — 63600175 PHARM REV CODE 636 W HCPCS: Mod: HCNC | Performed by: STUDENT IN AN ORGANIZED HEALTH CARE EDUCATION/TRAINING PROGRAM

## 2023-09-28 PROCEDURE — 96365 THER/PROPH/DIAG IV INF INIT: CPT | Mod: HCNC

## 2023-09-28 PROCEDURE — 25000003 PHARM REV CODE 250: Mod: HCNC | Performed by: STUDENT IN AN ORGANIZED HEALTH CARE EDUCATION/TRAINING PROGRAM

## 2023-09-28 PROCEDURE — 96366 THER/PROPH/DIAG IV INF ADDON: CPT | Mod: HCNC

## 2023-09-28 RX ORDER — SODIUM CHLORIDE 0.9 % (FLUSH) 0.9 %
10 SYRINGE (ML) INJECTION
Status: DISCONTINUED | OUTPATIENT
Start: 2023-09-28 | End: 2023-09-28 | Stop reason: HOSPADM

## 2023-09-28 RX ORDER — DIPHENHYDRAMINE HYDROCHLORIDE 50 MG/ML
50 INJECTION INTRAMUSCULAR; INTRAVENOUS ONCE AS NEEDED
Status: DISCONTINUED | OUTPATIENT
Start: 2023-09-28 | End: 2023-09-28 | Stop reason: HOSPADM

## 2023-09-28 RX ORDER — METHYLPREDNISOLONE SOD SUCC 125 MG
125 VIAL (EA) INJECTION ONCE AS NEEDED
Status: CANCELLED | OUTPATIENT
Start: 2023-10-03

## 2023-09-28 RX ORDER — SODIUM CHLORIDE 0.9 % (FLUSH) 0.9 %
10 SYRINGE (ML) INJECTION
Status: CANCELLED | OUTPATIENT
Start: 2023-10-03

## 2023-09-28 RX ORDER — METHYLPREDNISOLONE SOD SUCC 125 MG
125 VIAL (EA) INJECTION ONCE AS NEEDED
Status: DISCONTINUED | OUTPATIENT
Start: 2023-09-28 | End: 2023-09-28 | Stop reason: HOSPADM

## 2023-09-28 RX ORDER — HEPARIN 100 UNIT/ML
500 SYRINGE INTRAVENOUS
Status: DISCONTINUED | OUTPATIENT
Start: 2023-09-28 | End: 2023-09-28 | Stop reason: HOSPADM

## 2023-09-28 RX ORDER — DIPHENHYDRAMINE HYDROCHLORIDE 50 MG/ML
50 INJECTION INTRAMUSCULAR; INTRAVENOUS ONCE AS NEEDED
Status: CANCELLED | OUTPATIENT
Start: 2023-10-03

## 2023-09-28 RX ORDER — EPINEPHRINE 0.3 MG/.3ML
0.3 INJECTION SUBCUTANEOUS ONCE AS NEEDED
Status: DISCONTINUED | OUTPATIENT
Start: 2023-09-28 | End: 2023-09-28 | Stop reason: HOSPADM

## 2023-09-28 RX ORDER — EPINEPHRINE 0.3 MG/.3ML
0.3 INJECTION SUBCUTANEOUS ONCE AS NEEDED
Status: CANCELLED | OUTPATIENT
Start: 2023-10-03

## 2023-09-28 RX ORDER — HEPARIN 100 UNIT/ML
500 SYRINGE INTRAVENOUS
Status: CANCELLED | OUTPATIENT
Start: 2023-10-03

## 2023-09-28 RX ADMIN — IRON SUCROSE 300 MG: 20 INJECTION, SOLUTION INTRAVENOUS at 08:09

## 2023-09-28 RX ADMIN — SODIUM CHLORIDE: 9 INJECTION, SOLUTION INTRAVENOUS at 08:09

## 2023-09-28 NOTE — PLAN OF CARE
Patient tolerated her iron infusion of Venofer. Reports pain to her right shoulder. States she is going to have surgery but states she needs the iron infusions prior to her surgery. VSS. NAD. Discussed discharge instructions. Verbalized understanding. Patient discharged to home with standby assistance.

## 2023-09-29 ENCOUNTER — PATIENT MESSAGE (OUTPATIENT)
Dept: PRIMARY CARE CLINIC | Facility: CLINIC | Age: 73
End: 2023-09-29
Payer: MEDICARE

## 2023-10-02 ENCOUNTER — CLINICAL SUPPORT (OUTPATIENT)
Dept: REHABILITATION | Facility: OTHER | Age: 73
End: 2023-10-02
Payer: MEDICARE

## 2023-10-02 DIAGNOSIS — Z74.09 IMPAIRED FUNCTIONAL MOBILITY, BALANCE, GAIT, AND ENDURANCE: Primary | ICD-10-CM

## 2023-10-02 DIAGNOSIS — R53.1 DECREASED RANGE OF MOTION WITH DECREASED STRENGTH: ICD-10-CM

## 2023-10-02 DIAGNOSIS — M25.60 DECREASED RANGE OF MOTION WITH DECREASED STRENGTH: ICD-10-CM

## 2023-10-02 PROCEDURE — 97110 THERAPEUTIC EXERCISES: CPT | Mod: PN,CQ

## 2023-10-02 PROCEDURE — 97530 THERAPEUTIC ACTIVITIES: CPT | Mod: PN,CQ

## 2023-10-02 NOTE — PROGRESS NOTES
[facility]   Physical Therapy Treatment Note      Name: Jessica Floyd  Clinic Number: [id number]    Therapy Diagnosis:   Encounter Diagnoses   Name Primary?    Impaired functional mobility, balance, gait, and endurance Yes    Decreased range of motion with decreased strength          Physician: Armani Cai PA-C     Visit Date: 10/2/2023    Physician Orders: PT Eval and Treat -- Weight bear as tolerated and ROMAT - caution with posterior hip activities, Work on core strength  Medical Diagnosis from Referral: S72.002A (ICD-10-CM) - Left displaced femoral neck fracture   Evaluation Date: 8/2/2023  Authorization Period Expiration: 10/01/2023  Plan of Care Expiration: 11/2/2023  Progress Note Due: last 9/8/23 (next one 10/8/23)  Visit # / Visits authorized: 10 / 11  FOTO: 10/2/2023   0/3         Time In:0840    Time Out: 0915  Total Billable Time:  35 minutes        Precautions: Standard, 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  Patient had no complaint of pain.      She was compliant with home exercise program.  Response to previous treatment: no adverse effects  Functional change: no change reported     Pain: 0/10   Location: L Hip     Objective      Objective Measures updated for progress note.    9/8/2023    Lumbar AROM Pain/Dysfunction with Movement:   Flexion WFL Good spine movement, decreased hip flex >90 per MD protocol   Right rotation WFL No pain   Left rotation WFL No pain         Strength:  Hip Left  Right    Flexion 4/5 4/5   Abduction 4/5 4+/5   Adduction 4+/5 4+/5   Extension 4/5   4/5       Knee Left  Right   Extension 4+/5 4/5      Ankle Left  Right    Dorsiflexion 4+/5 4+/5   Plantarflexion 25 heel raises with SC - started fatiguing 25 heel raises with SC - started fatiguing         Knee Left  Right    Extension 0 0  "  Flexion 130 140      Ankle Left  Right    Dorsiflexion 7 6   Plantarflexion 51 40      Hip ROM:  - R hip flexion: 110 (active)  - L hip flexion: 115 (active)      Flexibility: HS = fair (~ 85-90 bilaterally)    TUG: did not assess this visit due to patient request as she felt unstable today from lack of sleep  - 35 seconds with RW - initial eval     30 second sit-to-stand test (without UE support): (did not assess this visit due to patient request as she feels tired and off balance today)   0 without UE support; 5 with UE support - initial eval     Gait Analysis: Mod I with SPC, step through, bradykinetic with WBOS    Treatment     Jessica received the treatments listed in bold below:         therapeutic exercises to develop strength, endurance, ROM, flexibility, posture, and core stabilization for 20 minutes including:  Standing hip abduction 1# weight x 20  Standing hip extension 1# weight x 30  Seated hip adduction ball squeeze x 30 x 3"  Seated hip abduction x 30 with 3 sec hold both sides    +seated marches RTB x 30     Straight leg raise: x 20 bilaterally  HL 3-way clam 2 x 10 x RTB   Bridges x 15 (RTB at knees)  +LTR x 2'    Standing hip 3 way UE support on mat x 20 each direction   Supine hip abduction 1# 2 x 10   Hip Add ball squeeze 20 x 5"    Quad sets: 5"x15   Sidelying hip abduction: attempted but UA 2* pain in R shoulder (RTC tear)  Long arc quads: 30x   B HR  30x  Hip abd stand 15x 2  B         manual therapy techniques: Joint mobilizations and Manual traction were applied to the: L hip for  00 minutes, including:  Gentle PROM into flex L 20 sec x 3   Gentle inf glide np   PROM ER/ MR flex 90 gentle 20 sec x 3        therapeutic activities to improve functional performance for 15 minutes, including:  + standing marches in // bars 2 x 1 min   + mini squats 2 x 15    STS regular height, unilateral UE support x 15  Supine marches 2 x 10 1# ankle weight   Sit<>stands: elevated no UE support x 10 " "  Bridges w/ ball squeeze 2 x 10       Heat applied to R shoulder during mat exercises (not billed)    Patient Education and Home Exercises       Education provided:   - pt educated on proper exercise technique.      Written Home Exercises Provided: Patient instructed to cont prior HEP. Exercises were reviewed and Jessica was able to demonstrate them prior to the end of the session.  Jessica demonstrated good  understanding of the education provided. See EMR under Patient Instructions for exercises provided during therapy sessions    Assessment   Patient arrived 9 minutes late to treatment today.  Patient demonstrated increased endurance during treatment.  Exercises somewhat limited due to dizziness.  Patient continues to lack some quad and glute strength.  Patient required UE assist while getting out of chair.      Jessica Is progressing well towards her goals.   Pt prognosis is Good.     Pt will continue to benefit from skilled outpatient physical therapy to address the deficits listed in the problem list box on initial evaluation, provide pt/family education and to maximize pt's level of independence in the home and community environment.     Pt's spiritual, cultural and educational needs considered and pt agreeable to plan of care and goals.     Anticipated barriers to physical therapy: L shoulder OA, focus     Goals: updated 9/8/23  Short Term Goals (6 Weeks):  1. Pt able to stand >=30 minutes with ADLs with <3/10 pain. MET  2. Pt able to walk >=30 minutes with household chores with <3/10 pain. MET  3. Pt able to ascend/descend curb, independently, 1 handrail, with <3/10 pain. MET  4. Pt able to transfer in/out of chair 8x in 30 sec with 1 HHA to demonstrate improving functional strength MET  5. Pt to improve TUG test to >/=25" to demonstrate improving motor control/coordination with transfers.  Progressing, not met  6. Pt to improve static balance with OLVERA balance test >/=46/56 for stability with ADLs. " "Progressing, not met     Long Term Goals (12 Weeks):  1. Pt able to stand >=45 minutes with ADLs with <3/10 pain. MET  2. Pt able to walk >=45 minutes with household chores with <3/10 pain. MET  3. Pt able to ascend/descend 1 set of stairs, independently, 1 handrail, with <3/10 pain. MET  4. Pt able to transfer in/out of chair 10x in 30 sec with 1 HHA to demonstrate improving functional strength Progressing, not met  5. Pt to improve TUG test to >/=15" to demonstrate improving motor control/coordination with transfers.  Progressing, not met  6. Pt to improve static balance with OLVERA balance test >/=46/56 for stability with ADLs. Progressing, not met  7. Pt to demonstrate improved dynamic balance with DGI >/=19/24 for safety with ambulation. Progressing, not met    Plan   Continue to progress towards goals set by Physical Therapist focsuing on core stability and lower extremity strength.      Andrea Geiger, PTA      "

## 2023-10-04 ENCOUNTER — PATIENT MESSAGE (OUTPATIENT)
Dept: PRIMARY CARE CLINIC | Facility: CLINIC | Age: 73
End: 2023-10-04
Payer: MEDICARE

## 2023-10-04 DIAGNOSIS — K21.9 GASTROESOPHAGEAL REFLUX DISEASE WITHOUT ESOPHAGITIS: ICD-10-CM

## 2023-10-04 RX ORDER — PANTOPRAZOLE SODIUM 40 MG/1
40 TABLET, DELAYED RELEASE ORAL DAILY
Qty: 90 TABLET | Refills: 0 | Status: SHIPPED | OUTPATIENT
Start: 2023-10-04 | End: 2024-02-19 | Stop reason: SDUPTHER

## 2023-10-04 NOTE — TELEPHONE ENCOUNTER
No care due was identified.  [facility] Embedded Care Due Messages. Reference number: [id number].   10/04/2023 10:21:17 AM CDT

## 2023-10-04 NOTE — PROGRESS NOTES
"[facility]   Physical Therapy Treatment Note      Name: Jessica Floyd  Clinic Number: [id number]    Therapy Diagnosis:   Encounter Diagnoses   Name Primary?    Impaired functional mobility, balance, gait, and endurance Yes    Decreased range of motion with decreased strength        Physician: Armani Cai PA-C     Visit Date: 10/5/2023    Physician Orders: PT Eval and Treat -- Weight bear as tolerated and ROMAT - caution with posterior hip activities, Work on core strength  Medical Diagnosis from Referral: S72.002A (ICD-10-CM) - Left displaced femoral neck fracture   Evaluation Date: 8/2/2023  Authorization Period Expiration: 10/01/2023  Plan of Care Expiration: 11/2/2023  Progress Note Due: last 9/8/23 (next one 10/8/23)  Visit # / Visits authorized: 11 (29 / 40)  FOTO: 10/2/2023   2/3         Time In: 9:15 am  Time Out: 9:59 am  Total Billable Time:  40 minutes        Precautions: Standard, 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  Response to previous treatment: "It was fine"  Functional change: Is able to put on her pants standing up    Pain: 7/10   Location: R shoulder     Objective      Objective Measures updated for progress note.    9/8/2023    Lumbar AROM Pain/Dysfunction with Movement:   Flexion WFL Good spine movement, decreased hip flex >90 per MD protocol   Right rotation WFL No pain   Left rotation WFL No pain         Strength:  Hip Left  Right    Flexion 4/5 4/5   Abduction 4/5 4+/5   Adduction 4+/5 4+/5   Extension 4/5   4/5       Knee Left  Right   Extension 4+/5 4/5      Ankle Left  Right    Dorsiflexion 4+/5 4+/5   Plantarflexion 25 heel raises with SC - started fatiguing 25 heel raises with SC - started fatiguing         Knee Left  Right    Extension 0 0 " "  Flexion 130 140      Ankle Left  Right    Dorsiflexion 7 6   Plantarflexion 51 40      Hip ROM:  - R hip flexion: 110 (active)  - L hip flexion: 115 (active)      Flexibility: HS = fair (~ 85-90 bilaterally)    TUG: did not assess this visit due to patient request as she felt unstable today from lack of sleep  - 35 seconds with RW - initial eval     30 second sit-to-stand test (without UE support): (did not assess this visit due to patient request as she feels tired and off balance today)   0 without UE support; 5 with UE support - initial eval     Gait Analysis: Mod I with SPC, step through, bradykinetic with WBOS    Treatment     Jessica received the treatments listed in bold below:         therapeutic exercises to develop strength, endurance, ROM, flexibility, posture, and core stabilization for 23 minutes including:  Standing hip abduction 1# weight x 20   Standing hip extension 1# weight x 30   Seated hip adduction ball squeeze x 30 x 3"   Seated hip abduction x 30 with 3 sec hold both sides     Seated marches RTB x 30     Straight leg raise: x 20 bilaterally  HL 3-way clam 2 x 10 x RTB   Bridges x 15 (RTB at knees)  +LTR x 2'    Standing hip 3 way UE support on mat x 20 each direction   Supine hip abduction 1# 2 x 10   Hip Add ball squeeze 20 x 5"    Quad sets: 5"x15   Sidelying hip abduction: attempted but UA 2* pain in R shoulder (RTC tear)  Long arc quads: 30x   B HR  30x  Hip abd stand 15x 2  B         manual therapy techniques: Joint mobilizations and Manual traction were applied to the: L hip for 00 minutes, including:  Gentle PROM into flex L 20 sec x 3   Gentle inf glide np   PROM ER/ MR flex 90 gentle 20 sec x 3        therapeutic activities to improve functional performance for 15 minutes, including:  Standing marches 1# in // bars x 30   Mini squats in // bars 2 x 15  Lateral stepping in // bars x 7 laps    STS regular height, unilateral UE support x 15  Supine marches 2 x 10 1# ankle weight " "  Sit<>stands: elevated no UE support x 10   Bridges w/ ball squeeze 2 x 10       Heat applied to R shoulder during mat exercises (not billed)    Patient Education and Home Exercises       Education provided:   - pt educated on proper exercise technique.      Written Home Exercises Provided: Patient instructed to cont prior HEP. Exercises were reviewed and Jessica was able to demonstrate them prior to the end of the session.  Jessica demonstrated good  understanding of the education provided. See EMR under Patient Instructions for exercises provided during therapy sessions    Assessment   Pt tolerated treatment well with no adverse effects reported. Performed mostly standing exercises today to challenge balance, but pt required moderate use of B UE to maintain balance. Supervision and SBA required with exercises and sit to stand transfers.     Jessica Is progressing well towards her goals.   Pt prognosis is Good.     Pt will continue to benefit from skilled outpatient physical therapy to address the deficits listed in the problem list box on initial evaluation, provide pt/family education and to maximize pt's level of independence in the home and community environment.     Pt's spiritual, cultural and educational needs considered and pt agreeable to plan of care and goals.     Anticipated barriers to physical therapy: L shoulder OA, focus     Goals: updated 9/8/23  Short Term Goals (6 Weeks):  1. Pt able to stand >=30 minutes with ADLs with <3/10 pain. MET  2. Pt able to walk >=30 minutes with household chores with <3/10 pain. MET  3. Pt able to ascend/descend curb, independently, 1 handrail, with <3/10 pain. MET  4. Pt able to transfer in/out of chair 8x in 30 sec with 1 HHA to demonstrate improving functional strength MET  5. Pt to improve TUG test to >/=25" to demonstrate improving motor control/coordination with transfers.  Progressing, not met  6. Pt to improve static balance with OLVERA balance test >/=46/56 for " "stability with ADLs. Progressing, not met     Long Term Goals (12 Weeks):  1. Pt able to stand >=45 minutes with ADLs with <3/10 pain. MET  2. Pt able to walk >=45 minutes with household chores with <3/10 pain. MET  3. Pt able to ascend/descend 1 set of stairs, independently, 1 handrail, with <3/10 pain. MET  4. Pt able to transfer in/out of chair 10x in 30 sec with 1 HHA to demonstrate improving functional strength Progressing, not met  5. Pt to improve TUG test to >/=15" to demonstrate improving motor control/coordination with transfers.  Progressing, not met  6. Pt to improve static balance with OLVERA balance test >/=46/56 for stability with ADLs. Progressing, not met  7. Pt to demonstrate improved dynamic balance with DGI >/=19/24 for safety with ambulation. Progressing, not met    Plan   Continue to progress towards goals set by Physical Therapist focsuing on core stability and lower extremity strength.      Vlad Lopez III, PTA      "

## 2023-10-04 NOTE — TELEPHONE ENCOUNTER
Refill Encounter    PCP Visits: Recent Visits  Date Type Provider Dept   03/28/23 Office Visit Effie Olmedo MD [facility]   03/14/23 Office Visit Effie Olmedo MD [facility]   Showing recent visits within past 360 days and meeting all other requirements  Future Appointments  No visits were found meeting these conditions.  Showing future appointments within next 720 days and meeting all other requirements     Last 3 Blood Pressure:   BP Readings from Last 3 Encounters:   09/28/23 (!) 155/87   09/20/23 138/86   09/19/23 (!) 144/75     Preferred Pharmacy:   [facility]/pharmacy #[id number] - [place], [place] - [address]  [address] [address]  Phone: [phone] Fax: [phone]    [facility] - [place], [place] - [address]  [address] [address]  Phone: [phone] Fax: [phone]    Requested RX:  Requested Prescriptions     Pending Prescriptions Disp Refills    pantoprazole (PROTONIX) 40 MG tablet 90 tablet 0     Sig: Take 1 tablet (40 mg total) by mouth once daily.      RX Route: Normal

## 2023-10-05 ENCOUNTER — CLINICAL SUPPORT (OUTPATIENT)
Dept: REHABILITATION | Facility: OTHER | Age: 73
End: 2023-10-05
Payer: MEDICARE

## 2023-10-05 DIAGNOSIS — Z74.09 IMPAIRED FUNCTIONAL MOBILITY, BALANCE, GAIT, AND ENDURANCE: Primary | ICD-10-CM

## 2023-10-05 DIAGNOSIS — R53.1 DECREASED RANGE OF MOTION WITH DECREASED STRENGTH: ICD-10-CM

## 2023-10-05 DIAGNOSIS — M25.60 DECREASED RANGE OF MOTION WITH DECREASED STRENGTH: ICD-10-CM

## 2023-10-05 PROCEDURE — 97110 THERAPEUTIC EXERCISES: CPT | Mod: PN,CQ

## 2023-10-05 PROCEDURE — 97530 THERAPEUTIC ACTIVITIES: CPT | Mod: PN,CQ

## 2023-10-06 ENCOUNTER — INFUSION (OUTPATIENT)
Dept: INFUSION THERAPY | Facility: OTHER | Age: 73
End: 2023-10-06
Attending: STUDENT IN AN ORGANIZED HEALTH CARE EDUCATION/TRAINING PROGRAM
Payer: MEDICARE

## 2023-10-06 VITALS
RESPIRATION RATE: 20 BRPM | TEMPERATURE: 98 F | DIASTOLIC BLOOD PRESSURE: 83 MMHG | SYSTOLIC BLOOD PRESSURE: 165 MMHG | OXYGEN SATURATION: 96 % | HEART RATE: 79 BPM

## 2023-10-06 DIAGNOSIS — D50.0 IRON DEFICIENCY ANEMIA SECONDARY TO BLOOD LOSS (CHRONIC): Primary | ICD-10-CM

## 2023-10-06 PROCEDURE — 25000003 PHARM REV CODE 250: Performed by: STUDENT IN AN ORGANIZED HEALTH CARE EDUCATION/TRAINING PROGRAM

## 2023-10-06 PROCEDURE — 96365 THER/PROPH/DIAG IV INF INIT: CPT

## 2023-10-06 PROCEDURE — 63600175 PHARM REV CODE 636 W HCPCS: Performed by: STUDENT IN AN ORGANIZED HEALTH CARE EDUCATION/TRAINING PROGRAM

## 2023-10-06 PROCEDURE — 96366 THER/PROPH/DIAG IV INF ADDON: CPT

## 2023-10-06 RX ORDER — HEPARIN 100 UNIT/ML
500 SYRINGE INTRAVENOUS
Status: DISCONTINUED | OUTPATIENT
Start: 2023-10-06 | End: 2023-10-06 | Stop reason: HOSPADM

## 2023-10-06 RX ORDER — METHYLPREDNISOLONE SOD SUCC 125 MG
125 VIAL (EA) INJECTION ONCE AS NEEDED
Status: DISCONTINUED | OUTPATIENT
Start: 2023-10-06 | End: 2023-10-06 | Stop reason: HOSPADM

## 2023-10-06 RX ORDER — HEPARIN 100 UNIT/ML
500 SYRINGE INTRAVENOUS
OUTPATIENT
Start: 2023-10-10

## 2023-10-06 RX ORDER — EPINEPHRINE 0.3 MG/.3ML
0.3 INJECTION SUBCUTANEOUS ONCE AS NEEDED
OUTPATIENT
Start: 2023-10-10

## 2023-10-06 RX ORDER — DIPHENHYDRAMINE HYDROCHLORIDE 50 MG/ML
50 INJECTION INTRAMUSCULAR; INTRAVENOUS ONCE AS NEEDED
OUTPATIENT
Start: 2023-10-10

## 2023-10-06 RX ORDER — SODIUM CHLORIDE 0.9 % (FLUSH) 0.9 %
10 SYRINGE (ML) INJECTION
OUTPATIENT
Start: 2023-10-10

## 2023-10-06 RX ORDER — EPINEPHRINE 0.3 MG/.3ML
0.3 INJECTION SUBCUTANEOUS ONCE AS NEEDED
Status: DISCONTINUED | OUTPATIENT
Start: 2023-10-06 | End: 2023-10-06 | Stop reason: HOSPADM

## 2023-10-06 RX ORDER — SODIUM CHLORIDE 0.9 % (FLUSH) 0.9 %
10 SYRINGE (ML) INJECTION
Status: DISCONTINUED | OUTPATIENT
Start: 2023-10-06 | End: 2023-10-06 | Stop reason: HOSPADM

## 2023-10-06 RX ORDER — DIPHENHYDRAMINE HYDROCHLORIDE 50 MG/ML
50 INJECTION INTRAMUSCULAR; INTRAVENOUS ONCE AS NEEDED
Status: DISCONTINUED | OUTPATIENT
Start: 2023-10-06 | End: 2023-10-06 | Stop reason: HOSPADM

## 2023-10-06 RX ORDER — METHYLPREDNISOLONE SOD SUCC 125 MG
125 VIAL (EA) INJECTION ONCE AS NEEDED
OUTPATIENT
Start: 2023-10-10

## 2023-10-06 RX ADMIN — IRON SUCROSE 300 MG: 20 INJECTION, SOLUTION INTRAVENOUS at 09:10

## 2023-10-06 RX ADMIN — SODIUM CHLORIDE: 9 INJECTION, SOLUTION INTRAVENOUS at 09:10

## 2023-10-06 NOTE — PLAN OF CARE
Venofer infusion administered, no reaction. Patient tolerated well. 24 gauge IV removed, catheter intact. Patient escorted by Agata Villanueva RN to parking lot by patients spouse. No apparent distress noted. Discharge instructions given to patient. Patient understands instructions.

## 2023-10-09 ENCOUNTER — CLINICAL SUPPORT (OUTPATIENT)
Dept: REHABILITATION | Facility: OTHER | Age: 73
End: 2023-10-09
Payer: MEDICARE

## 2023-10-09 DIAGNOSIS — M25.60 DECREASED RANGE OF MOTION WITH DECREASED STRENGTH: ICD-10-CM

## 2023-10-09 DIAGNOSIS — Z74.09 IMPAIRED FUNCTIONAL MOBILITY, BALANCE, GAIT, AND ENDURANCE: Primary | ICD-10-CM

## 2023-10-09 DIAGNOSIS — R53.1 DECREASED RANGE OF MOTION WITH DECREASED STRENGTH: ICD-10-CM

## 2023-10-09 PROCEDURE — 97110 THERAPEUTIC EXERCISES: CPT | Mod: PN

## 2023-10-09 PROCEDURE — 97530 THERAPEUTIC ACTIVITIES: CPT | Mod: PN

## 2023-10-09 NOTE — PROGRESS NOTES
"[facility]   Physical Therapy Treatment Note      Name: Jessica Floyd  Clinic Number: [id number]    Therapy Diagnosis:   Encounter Diagnoses   Name Primary?    Impaired functional mobility, balance, gait, and endurance Yes    Decreased range of motion with decreased strength          Physician: Armani Cai PA-C     Visit Date: 10/9/2023    Physician Orders: PT Eval and Treat -- Weight bear as tolerated and ROMAT - caution with posterior hip activities, Work on core strength  Medical Diagnosis from Referral: S72.002A (ICD-10-CM) - Left displaced femoral neck fracture   Evaluation Date: 8/2/2023  Authorization Period Expiration: 10/01/2023  Plan of Care Expiration: 11/2/2023  Progress Note Due: last 9/8/23 (next one 10/10/23)  Visit # / Visits authorized: 12 (29 / 40)  FOTO: 10/2/2023   2/3         Time In: 9:18 am  Time Out: 10:00 am  Total Billable Time: 42 minutes        Precautions: Standard, 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 She is still experiencing some sinus issues with excessive drainage causing some dizziness for her. The shoulder is her main problem at this time.     She was compliant with home exercise program.  Response to previous treatment: "It was fine"  Functional change: Is able to put on her pants standing up    Pain: 0/10 - hip; 8/10 - R shoulder   Location: R shoulder and hip      Objective      Objective Measures updated for progress note.    9/8/2023    Lumbar AROM Pain/Dysfunction with Movement:   Flexion WFL Good spine movement, decreased hip flex >90 per MD protocol   Right rotation WFL No pain   Left rotation WFL No pain         Strength:  Hip Left  Right    Flexion 4/5 4/5   Abduction 4/5 4+/5   Adduction 4+/5 4+/5   Extension 4/5   4/5       Knee Left  Right   Extension 4+/5 4/5      Ankle Left  Right    Dorsiflexion " "4+/5 4+/5   Plantarflexion 25 heel raises with SC - started fatiguing 25 heel raises with SC - started fatiguing         Knee Left  Right    Extension 0 0   Flexion 130 140      Ankle Left  Right    Dorsiflexion 7 6   Plantarflexion 51 40      Hip ROM:  - R hip flexion: 110 (active)  - L hip flexion: 115 (active)      Flexibility: HS = fair (~ 85-90 bilaterally)    TUG: did not assess this visit due to patient request as she felt unstable today from lack of sleep  - 35 seconds with RW - initial eval     30 second sit-to-stand test (without UE support): (did not assess this visit due to patient request as she feels tired and off balance today)   0 without UE support; 5 with UE support - initial eval     Gait Analysis: Mod I with SPC, step through, bradykinetic with WBOS      Treatment     Jessica received the treatments listed in bold below:         therapeutic exercises to develop strength, endurance, ROM, flexibility, posture, and core stabilization for 15 minutes including:  Bridges + ball squeeze x 15 x 3"  + supine marches + 2# ankle weights x 20   HL 3-way clam RTB 2 x 10   Seated hip adduction ball squeeze x 30 x 3"       Seated hip abduction x 30 with 3 sec hold both sides         Straight leg raise: x 20 bilaterally  +LTR x 2'  Supine hip abduction 1# 2 x 10   Hip Add ball squeeze 20 x 5"    Quad sets: 5"x15   Sidelying hip abduction: attempted but UA 2* pain in R shoulder (RTC tear)  Long arc quads: 30x   B HR  30x  Hip abd stand 15x 2  B         manual therapy techniques: Joint mobilizations and Manual traction were applied to the: L hip for 00 minutes, including:  Gentle PROM into flex L 20 sec x 3   Gentle inf glide np   PROM ER/ MR flex 90 gentle 20 sec x 3        therapeutic activities to improve functional performance for 27 minutes, including:  Standing marches 1# at high mat x 30   Mini squats at high mat 2 x 15   Seated marches GTB x 20     Lateral stepping in // bars x 7 laps    Standing hip " abduction 1# weight x 15 each    Standing hip extension 1# weight x 15 each      STS regular height, unilateral UE support x 15  Supine marches 2 x 10 1# ankle weight   Sit<>stands: elevated no UE support x 10       Heat applied to R shoulder during mat exercises (not billed)    Patient Education and Home Exercises       Education provided:   - pt educated on current POC with d/c next visit - educated on HEP and updated HEP for next visit.    Written Home Exercises Provided: Patient instructed to cont prior HEP. Exercises were reviewed and Jessica was able to demonstrate them prior to the end of the session.  Jessica demonstrated good  understanding of the education provided. See EMR under Patient Instructions for exercises provided during therapy sessions    Assessment     Pt had great tolerance to treatment with no pain noted throughout session. Performed series of mat exercises as well as series of standing strengthening activities to challenge balance with good response. Pt did require UE support on high mat to maintain stability and prevent LOB. Was able to progress level of resistance with hooklying clams and with seated marches and pt voiced appropriate level of challenge. Plan to d/c patient next visit with updated HEP to maintain progress at home.       Jessica Is progressing well towards her goals.   Pt prognosis is Good.     Pt will continue to benefit from skilled outpatient physical therapy to address the deficits listed in the problem list box on initial evaluation, provide pt/family education and to maximize pt's level of independence in the home and community environment.     Pt's spiritual, cultural and educational needs considered and pt agreeable to plan of care and goals.     Anticipated barriers to physical therapy: L shoulder OA, focus     Goals: updated 9/8/23  Short Term Goals (6 Weeks):  1. Pt able to stand >=30 minutes with ADLs with <3/10 pain. MET  2. Pt able to walk >=30 minutes with  "household chores with <3/10 pain. MET  3. Pt able to ascend/descend curb, independently, 1 handrail, with <3/10 pain. MET  4. Pt able to transfer in/out of chair 8x in 30 sec with 1 HHA to demonstrate improving functional strength MET  5. Pt to improve TUG test to >/=25" to demonstrate improving motor control/coordination with transfers.  Progressing, not met  6. Pt to improve static balance with OLVERA balance test >/=46/56 for stability with ADLs. Progressing, not met     Long Term Goals (12 Weeks):  1. Pt able to stand >=45 minutes with ADLs with <3/10 pain. MET  2. Pt able to walk >=45 minutes with household chores with <3/10 pain. MET  3. Pt able to ascend/descend 1 set of stairs, independently, 1 handrail, with <3/10 pain. MET  4. Pt able to transfer in/out of chair 10x in 30 sec with 1 HHA to demonstrate improving functional strength Progressing, not met  5. Pt to improve TUG test to >/=15" to demonstrate improving motor control/coordination with transfers.  Progressing, not met  6. Pt to improve static balance with OLVERA balance test >/=46/56 for stability with ADLs. Progressing, not met  7. Pt to demonstrate improved dynamic balance with DGI >/=19/24 for safety with ambulation. Progressing, not met    Plan     Plan to d/c next visit with updated HEP. Reassess goals and objective measures with JOANN.     Vivien Choi, PT      "

## 2023-10-10 DIAGNOSIS — I10 ESSENTIAL HYPERTENSION: ICD-10-CM

## 2023-10-10 RX ORDER — AMLODIPINE BESYLATE 5 MG/1
5 TABLET ORAL
Qty: 90 TABLET | Refills: 1 | Status: SHIPPED | OUTPATIENT
Start: 2023-10-10 | End: 2024-02-27 | Stop reason: SDUPTHER

## 2023-10-10 NOTE — TELEPHONE ENCOUNTER
No care due was identified.  [facility] Embedded Care Due Messages. Reference number: [id number].   10/10/2023 10:53:54 AM CDT

## 2023-10-10 NOTE — TELEPHONE ENCOUNTER
Refill Encounter    PCP Visits: Recent Visits  Date Type Provider Dept   03/28/23 Office Visit Effie Olmedo MD [facility]   03/14/23 Office Visit Effie Olmedo MD [facility]   Showing recent visits within past 360 days and meeting all other requirements  Future Appointments  No visits were found meeting these conditions.  Showing future appointments within next 720 days and meeting all other requirements     Last 3 Blood Pressure:   BP Readings from Last 3 Encounters:   10/06/23 (!) 165/83   09/28/23 (!) 155/87   09/20/23 138/86     Preferred Pharmacy:   [facility]/pharmacy #[id number] - [place], [place] - [address]  [address] [address]  Phone: [phone] Fax: [phone]    [facility] - [place], [place] - [address]  [address] [address]  Phone: [phone] Fax: [phone]    Requested RX:  Requested Prescriptions     Pending Prescriptions Disp Refills    amLODIPine (NORVASC) 5 MG tablet [Pharmacy Med Name: AMLODIPINE BESYLATE 5 MG TAB] 90 tablet 1     Sig: TAKE 1 TABLET BY MOUTH EVERY DAY      RX Route: Normal

## 2023-10-11 ENCOUNTER — CLINICAL SUPPORT (OUTPATIENT)
Dept: REHABILITATION | Facility: OTHER | Age: 73
End: 2023-10-11
Payer: MEDICARE

## 2023-10-11 DIAGNOSIS — R53.1 DECREASED RANGE OF MOTION WITH DECREASED STRENGTH: ICD-10-CM

## 2023-10-11 DIAGNOSIS — Z74.09 IMPAIRED FUNCTIONAL MOBILITY, BALANCE, GAIT, AND ENDURANCE: Primary | ICD-10-CM

## 2023-10-11 DIAGNOSIS — M25.60 DECREASED RANGE OF MOTION WITH DECREASED STRENGTH: ICD-10-CM

## 2023-10-11 PROCEDURE — 97530 THERAPEUTIC ACTIVITIES: CPT | Mod: PN

## 2023-10-11 PROCEDURE — 97110 THERAPEUTIC EXERCISES: CPT | Mod: PN

## 2023-10-11 NOTE — PROGRESS NOTES
"[facility]   Physical Therapy Treatment Note      Name: Jessica Floyd  Clinic Number: [id number]    Therapy Diagnosis:   Encounter Diagnoses   Name Primary?    Impaired functional mobility, balance, gait, and endurance Yes    Decreased range of motion with decreased strength          Physician: Armani Cai PA-C     Visit Date: 10/11/2023    Physician Orders: PT Eval and Treat -- Weight bear as tolerated and ROMAT - caution with posterior hip activities, Work on core strength  Medical Diagnosis from Referral: S72.002A (ICD-10-CM) - Left displaced femoral neck fracture   Evaluation Date: 8/2/2023  Authorization Period Expiration: 10/01/2023  Plan of Care Expiration: 11/2/2023  Progress Note Due: last 9/8/23 (next one 10/10/23)  Visit # / Visits authorized: 12 (29 / 40)  FOTO: 10/2/2023   2/3         Time In: 8:45 am  Time Out: 9:15 am  Total Billable Time: 30 minutes        Precautions: Standard, 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 C/c today is R shoulder pain but is scheduled for total shoulder surgery in 2 weeks. Ready for discharge today. Will be back in November s/p shoulder surgery.    She was compliant with home exercise program.  Response to previous treatment: "It was fine"  Functional change: Is able to put on her pants standing up    Pain: 0/10 - hip; 8/10 - R shoulder   Location: R shoulder and hip      Objective      Objective Measures updated for progress note.    10/11/2023    Lumbar AROM Pain/Dysfunction with Movement:   Flexion WFL Good spine movement, decreased hip flex >90 per MD protocol   Right rotation WFL No pain   Left rotation WFL No pain         Strength:  Hip Left  Right    Flexion 4+/5 4+/5   Abduction 4+/5 4+/5   Adduction 4+/5 4+/5   Extension 4+/5   4+/5       Knee Left  Right   Extension 5/5 5/5    " "  Ankle Left  Right    Dorsiflexion 5/5 5/5   Plantarflexion 25 heel raises with SC - started fatiguing 25 heel raises with SC - started fatiguing         Knee Left  Right    Extension 0 0   Flexion 130 140      Ankle Left  Right    Dorsiflexion 7 6   Plantarflexion 51 40      Hip ROM:  - R hip flexion: 110 (active)  - L hip flexion: 115 (active)      Flexibility: HS = fair (~ 85-90 bilaterally)    TU sec   30 second sit-to-stand test (without UE support): 6x without HHA     Gait Analysis: Mod I with SPC, step through, bradykinetic with WBOS      Treatment     Jessica received the treatments listed in bold below:         therapeutic exercises to develop strength, endurance, ROM, flexibility, posture, and core stabilization for 15 minutes including:  Bridges + ball squeeze x 15 x 3"  supine marches + 2# ankle weights x 20   HL 3-way clam RTB 2 x 10   Seated hip adduction ball squeeze x 30 x 3"       Seated hip abduction x 30 with 3 sec hold both sides         Straight leg raise: x 20 bilaterally  +LTR x 2'  Supine hip abduction 1# 2 x 10   Hip Add ball squeeze 20 x 5"    Quad sets: 5"x15   Sidelying hip abduction: attempted but UA 2* pain in R shoulder (RTC tear)  Long arc quads: 30x   B HR  30x  Hip abd stand 15x 2  B         manual therapy techniques: Joint mobilizations and Manual traction were applied to the: L hip for 00 minutes, including:  Gentle PROM into flex L 20 sec x 3   Gentle inf glide np   PROM ER/ MR flex 90 gentle 20 sec x 3        therapeutic activities to improve functional performance for 15 minutes, including:  Reassessment x 15    Standing marches 1# at high mat x 30   Mini squats at high mat 2 x 15   Seated marches GTB x 20     Lateral stepping in // bars x 7 laps    Standing hip abduction 1# weight x 15 each    Standing hip extension 1# weight x 15 each      STS regular height, unilateral UE support x 15  Supine marches 2 x 10 1# ankle weight   Sit<>stands: elevated no UE support x 10 " "      Heat applied to R shoulder during mat exercises (not billed)    Patient Education and Home Exercises       Education provided:   - pt educated on current POC with d/c next visit - educated on HEP and updated HEP for next visit.    Written Home Exercises Provided: Patient instructed to cont prior HEP. Exercises were reviewed and Jessica was able to demonstrate them prior to the end of the session.  Jessica demonstrated good  understanding of the education provided. See EMR under Patient Instructions for exercises provided during therapy sessions    Assessment     Updated HEP per pt request, to promote strength, mobility in supine position as pt has increased pain in shoulder and has poor tolerance with standing exercises. Pt is independent with HEP and demonstrates good return technique. Pt presents with functional strength, ROM, functional mobility. Pt no longer has pain or difficulty with functional activities and has returned to PLOF. Pt has progressed well and has met 9 of 13 therapeutic goals. Pt no longer requires skilled PT. Recommend D/C from skilled care.      Pt's spiritual, cultural and educational needs considered and pt agreeable to plan of care and goals.     Anticipated barriers to physical therapy: L shoulder OA, focus     Goals: updated 9/8/23  Short Term Goals (6 Weeks):  1. Pt able to stand >=30 minutes with ADLs with <3/10 pain. MET  2. Pt able to walk >=30 minutes with household chores with <3/10 pain. MET  3. Pt able to ascend/descend curb, independently, 1 handrail, with <3/10 pain. MET  4. Pt able to transfer in/out of chair 8x in 30 sec with 1 HHA to demonstrate improving functional strength MET  5. Pt to improve TUG test to >/=25" to demonstrate improving motor control/coordination with transfers.  met  6. Pt to improve static balance with OLVERA balance test >/=46/56 for stability with ADLs. not met     Long Term Goals (12 Weeks):  1. Pt able to stand >=45 minutes with ADLs with <3/10 " "pain. MET  2. Pt able to walk >=45 minutes with household chores with <3/10 pain. MET  3. Pt able to ascend/descend 1 set of stairs, independently, 1 handrail, with <3/10 pain. MET  4. Pt able to transfer in/out of chair 10x in 30 sec with 1 HHA to demonstrate improving functional strength not met  5. Pt to improve TUG test to >/=15" to demonstrate improving motor control/coordination with transfers.  met  6. Pt to improve static balance with OLVERA balance test >/=46/56 for stability with ADLs. not met  7. Pt to demonstrate improved dynamic balance with DGI >/=19/24 for safety with ambulation. not met    Plan     D/c with updated HEP. Advised pt to F/U with MD if sx worsen or do not resolve.    Evonne Pope, PT      "

## 2023-10-15 ENCOUNTER — PATIENT MESSAGE (OUTPATIENT)
Dept: PRIMARY CARE CLINIC | Facility: CLINIC | Age: 73
End: 2023-10-15
Payer: MEDICARE

## 2023-10-15 DIAGNOSIS — R09.82 POSTNASAL DRIP: ICD-10-CM

## 2023-10-16 ENCOUNTER — OFFICE VISIT (OUTPATIENT)
Dept: INTERNAL MEDICINE | Facility: CLINIC | Age: 73
End: 2023-10-16
Payer: MEDICARE

## 2023-10-16 VITALS
RESPIRATION RATE: 16 BRPM | OXYGEN SATURATION: 98 % | TEMPERATURE: 98 F | HEART RATE: 65 BPM | SYSTOLIC BLOOD PRESSURE: 126 MMHG | DIASTOLIC BLOOD PRESSURE: 66 MMHG | WEIGHT: 120 LBS | BODY MASS INDEX: 20.49 KG/M2 | HEIGHT: 64 IN

## 2023-10-16 DIAGNOSIS — R11.2 NAUSEA AND VOMITING, UNSPECIFIED VOMITING TYPE: ICD-10-CM

## 2023-10-16 DIAGNOSIS — M10.9 GOUT, UNSPECIFIED CAUSE, UNSPECIFIED CHRONICITY, UNSPECIFIED SITE: ICD-10-CM

## 2023-10-16 DIAGNOSIS — I10 PRIMARY HYPERTENSION: ICD-10-CM

## 2023-10-16 DIAGNOSIS — H54.7 REDUCED VISION: ICD-10-CM

## 2023-10-16 DIAGNOSIS — K21.9 GASTROESOPHAGEAL REFLUX DISEASE WITHOUT ESOPHAGITIS: ICD-10-CM

## 2023-10-16 DIAGNOSIS — I65.23 BILATERAL CAROTID ARTERY STENOSIS: ICD-10-CM

## 2023-10-16 DIAGNOSIS — Z01.818 PREOP EXAMINATION: Primary | ICD-10-CM

## 2023-10-16 DIAGNOSIS — M81.0 OSTEOPOROSIS, UNSPECIFIED OSTEOPOROSIS TYPE, UNSPECIFIED PATHOLOGICAL FRACTURE PRESENCE: ICD-10-CM

## 2023-10-16 DIAGNOSIS — D50.0 IRON DEFICIENCY ANEMIA SECONDARY TO BLOOD LOSS (CHRONIC): ICD-10-CM

## 2023-10-16 DIAGNOSIS — Z87.81 HISTORY OF HIP FRACTURE: ICD-10-CM

## 2023-10-16 DIAGNOSIS — I25.10 CORONARY ARTERY DISEASE INVOLVING NATIVE CORONARY ARTERY OF NATIVE HEART WITHOUT ANGINA PECTORIS: ICD-10-CM

## 2023-10-16 DIAGNOSIS — E78.5 DYSLIPIDEMIA: ICD-10-CM

## 2023-10-16 DIAGNOSIS — J30.1 ALLERGIC RHINITIS DUE TO POLLEN, UNSPECIFIED SEASONALITY: ICD-10-CM

## 2023-10-16 PROBLEM — E83.39 HYPOPHOSPHATEMIA: Status: RESOLVED | Noted: 2023-05-27 | Resolved: 2023-10-16

## 2023-10-16 PROBLEM — H25.12 NUCLEAR SCLEROTIC CATARACT OF LEFT EYE: Status: RESOLVED | Noted: 2022-11-07 | Resolved: 2023-10-16

## 2023-10-16 PROBLEM — D62 ACUTE BLOOD LOSS ANEMIA: Status: RESOLVED | Noted: 2023-05-29 | Resolved: 2023-10-16

## 2023-10-16 PROBLEM — D72.829 LEUKOCYTOSIS: Status: RESOLVED | Noted: 2023-05-27 | Resolved: 2023-10-16

## 2023-10-16 PROBLEM — E83.42 HYPOMAGNESEMIA: Status: RESOLVED | Noted: 2023-05-27 | Resolved: 2023-10-16

## 2023-10-16 PROBLEM — N18.31 STAGE 3A CHRONIC KIDNEY DISEASE: Status: RESOLVED | Noted: 2017-05-16 | Resolved: 2023-10-16

## 2023-10-16 PROBLEM — E87.6 HYPOKALEMIA: Status: RESOLVED | Noted: 2017-05-26 | Resolved: 2023-10-16

## 2023-10-16 PROCEDURE — 3078F DIAST BP <80 MM HG: CPT | Mod: CPTII,S$GLB,, | Performed by: HOSPITALIST

## 2023-10-16 PROCEDURE — 1159F MED LIST DOCD IN RCRD: CPT | Mod: CPTII,S$GLB,, | Performed by: HOSPITALIST

## 2023-10-16 PROCEDURE — 3044F HG A1C LEVEL LT 7.0%: CPT | Mod: CPTII,S$GLB,, | Performed by: HOSPITALIST

## 2023-10-16 PROCEDURE — 99999 PR PBB SHADOW E&M-EST. PATIENT-LVL IV: CPT | Mod: PBBFAC,,, | Performed by: HOSPITALIST

## 2023-10-16 PROCEDURE — 1101F PR PT FALLS ASSESS DOC 0-1 FALLS W/OUT INJ PAST YR: ICD-10-PCS | Mod: CPTII,S$GLB,, | Performed by: HOSPITALIST

## 2023-10-16 PROCEDURE — 1159F PR MEDICATION LIST DOCUMENTED IN MEDICAL RECORD: ICD-10-PCS | Mod: CPTII,S$GLB,, | Performed by: HOSPITALIST

## 2023-10-16 PROCEDURE — 1125F PR PAIN SEVERITY QUANTIFIED, PAIN PRESENT: ICD-10-PCS | Mod: CPTII,S$GLB,, | Performed by: HOSPITALIST

## 2023-10-16 PROCEDURE — 99999 PR PBB SHADOW E&M-EST. PATIENT-LVL IV: ICD-10-PCS | Mod: PBBFAC,,, | Performed by: HOSPITALIST

## 2023-10-16 PROCEDURE — 3078F PR MOST RECENT DIASTOLIC BLOOD PRESSURE < 80 MM HG: ICD-10-PCS | Mod: CPTII,S$GLB,, | Performed by: HOSPITALIST

## 2023-10-16 PROCEDURE — 99215 PR OFFICE/OUTPT VISIT, EST, LEVL V, 40-54 MIN: ICD-10-PCS | Mod: S$GLB,,, | Performed by: HOSPITALIST

## 2023-10-16 PROCEDURE — 3288F PR FALLS RISK ASSESSMENT DOCUMENTED: ICD-10-PCS | Mod: CPTII,S$GLB,, | Performed by: HOSPITALIST

## 2023-10-16 PROCEDURE — 3074F SYST BP LT 130 MM HG: CPT | Mod: CPTII,S$GLB,, | Performed by: HOSPITALIST

## 2023-10-16 PROCEDURE — 1160F PR REVIEW ALL MEDS BY PRESCRIBER/CLIN PHARMACIST DOCUMENTED: ICD-10-PCS | Mod: CPTII,S$GLB,, | Performed by: HOSPITALIST

## 2023-10-16 PROCEDURE — 3008F PR BODY MASS INDEX (BMI) DOCUMENTED: ICD-10-PCS | Mod: CPTII,S$GLB,, | Performed by: HOSPITALIST

## 2023-10-16 PROCEDURE — 3074F PR MOST RECENT SYSTOLIC BLOOD PRESSURE < 130 MM HG: ICD-10-PCS | Mod: CPTII,S$GLB,, | Performed by: HOSPITALIST

## 2023-10-16 PROCEDURE — 1101F PT FALLS ASSESS-DOCD LE1/YR: CPT | Mod: CPTII,S$GLB,, | Performed by: HOSPITALIST

## 2023-10-16 PROCEDURE — 1160F RVW MEDS BY RX/DR IN RCRD: CPT | Mod: CPTII,S$GLB,, | Performed by: HOSPITALIST

## 2023-10-16 PROCEDURE — 3008F BODY MASS INDEX DOCD: CPT | Mod: CPTII,S$GLB,, | Performed by: HOSPITALIST

## 2023-10-16 PROCEDURE — 3288F FALL RISK ASSESSMENT DOCD: CPT | Mod: CPTII,S$GLB,, | Performed by: HOSPITALIST

## 2023-10-16 PROCEDURE — 99215 OFFICE O/P EST HI 40 MIN: CPT | Mod: S$GLB,,, | Performed by: HOSPITALIST

## 2023-10-16 PROCEDURE — 3044F PR MOST RECENT HEMOGLOBIN A1C LEVEL <7.0%: ICD-10-PCS | Mod: CPTII,S$GLB,, | Performed by: HOSPITALIST

## 2023-10-16 PROCEDURE — 1125F AMNT PAIN NOTED PAIN PRSNT: CPT | Mod: CPTII,S$GLB,, | Performed by: HOSPITALIST

## 2023-10-16 RX ORDER — ASPIRIN 81 MG/1
81 TABLET ORAL DAILY
COMMUNITY

## 2023-10-16 RX ORDER — BENZONATATE 200 MG/1
200 CAPSULE ORAL 3 TIMES DAILY PRN
Qty: 30 CAPSULE | Refills: 0 | Status: SHIPPED | OUTPATIENT
Start: 2023-10-16 | End: 2023-10-26

## 2023-10-16 NOTE — OUTPATIENT SUBJECTIVE & OBJECTIVE
"Outpatient Subjective & Objective     Chief complaint-Preoperative evaluation, Perioperative Medical management, complication reduction plan     Active cardiac conditions- none    Revised cardiac risk index predictors- none    Functional capacity -Examples of physical activity , stays active, she has been getting therapy for balance strength and stamina,  can take a flight of stairs holding on to the railing----- She can undertake all the above activities without  chest pain,chest tightness, Shortness of breath ,dizziness,lightheadedness making her exercise tolerance more,   than 4 Mets.       Review of Systems   Constitutional:  Negative for chills and fever.   HENT:          STOPBANG score  2/ 8        Elevated BP  Age over 50        Eyes:         No new visual changes   Respiratory:          Postnasal drip from allergies  Dry cough   No Hemoptysis   Cardiovascular:         As noted   Gastrointestinal:         No overt GI/ blood losses  Bowel movements- Regular  LMP 40's    Endocrine:        Prednisone use > 20 mg daily for 3 weeks-None   Genitourinary:  Negative for dysuria.        No urinary hesitancy    Musculoskeletal:           No unusual, muscle, joint pains   Skin:  Negative for rash.   Neurological:         No unilateral weakness   Hematological:         Current use of Anticoagulants  None   Psychiatric/Behavioral:          No Depression,Anxiety                 No anesthesia, bleeding, cardiac problems, PONV with previous surgeries/procedures.  Medications and Allergies reviewed in epic.   FH- No anesthesia,bleeding / venous thrombosis ,   in family      Physical Exam  Blood pressure 126/66, pulse 65, temperature 97.8 °F (36.6 °C), temperature source Oral, resp. rate 16, height 5' 3.5" (1.613 m), weight 54.4 kg (120 lb), SpO2 98 %.    I offered a blanket and the presence of a chaperone during physical examination   She was comfortable to proceed with the exam without the the presence of a chaperone  "     Physical Exam  Constitutional- Vitals - Body mass index is 20.92 kg/m².,   Vitals:    10/16/23 0926   BP: 126/66   Pulse: 65   Resp: 16   Temp: 97.8 °F (36.6 °C)     General appearance-Conscious,Coherent  Eyes- No conjunctival icterus,pupils  round  and  bilateral intra ocular lenses  ENT-Oral cavity- moist    , Hearing grossly normal   Neck- No thyromegaly ,Trachea -central, No jugular venous distension,   No Carotid Bruit   Cardiovascular -Heart Sounds- I discussed this with her and I also had her feel her pulse this appears to be ectopic than AFib.  We discussed EKG that she felt comfortable without,  Normal ,  no murmur , and  occasional irregularity suggestive of ectopy   , No gallop rhythm   Respiratory - Normal Respiratory Effort, Normal breath sounds,  Crepitationsscattered ,  no wheeze , and  no forced expiratory wheeze    Peripheral pitting pedal edema-- none ,  varicose veins right lower extremity , and  varicose veins left lower extremity, no calf pain   Gastrointestinal -Soft abdomen, No palpable masses, Non Tender,Liver,Spleen not palpable. No-- free fluid and shifting dullness  Musculoskeletal- No finger Clubbing. Strength grossly normal   Lymphatic-No Palpable cervical, axillary,Inguinal lymphadenopathy   Psychiatric - normal effect,Orientation  Rt Dorsalis pedis pulses-palpable    Lt Dorsalis pedis pulses- palpable   Rt Posterior tibial pulses -palpable   Left posterior tibial pulses -palpable   Miscellaneous -  we discussed compression stocking use for varicose veins as it can increase the risk of blood clotting,  no renal bruit, and  examined on the chair    Investigations  Lab and Imaging have been reviewed in epic.      Review of old records- Was done and information gathered regards to events leading to surgery and health conditions of significance in the perioperative period.    Outpatient Subjective & Objective

## 2023-10-16 NOTE — TELEPHONE ENCOUNTER
Refill Encounter    PCP Visits: Recent Visits  Date Type Provider Dept   03/28/23 Office Visit Effie Olmedo MD [facility]   03/14/23 Office Visit Effie Olmedo MD [facility]   Showing recent visits within past 360 days and meeting all other requirements  Future Appointments  No visits were found meeting these conditions.  Showing future appointments within next 720 days and meeting all other requirements     Last 3 Blood Pressure:   BP Readings from Last 3 Encounters:   10/06/23 (!) 165/83   09/28/23 (!) 155/87   09/20/23 138/86     Preferred Pharmacy:   [facility]/pharm[facility] #[id number] - [place], [place] - [address]  [address] [address]  Phone: [phone] Fax: [phone]    [facility] - [place], [place] - [address]  [address] [address]  Phone: [phone] Fax: [phone]    Requested RX:  Requested Prescriptions     Pending Prescriptions Disp Refills    benzonatate (TESSALON) 200 MG capsule 30 capsule 0     Sig: Take 1 capsule (200 mg total) by mouth 3 (three) times daily as needed for Cough.      RX Route: Normal

## 2023-10-16 NOTE — PROGRESS NOTES
[facility] [address]  Progress Note    Patient Name: Jessica Floyd  MRN: [id number]  Date of Evaluation- 10/16/2023  PCP- Effie Olmedo MD    Future cases for Jessica Floyd [[id number]]       Case ID Status Date Time Roberto Procedure Provider Location    [id number] [facility] 10/25/2023  1:00  ARTHROPLASTY, SHOULDER, TOTAL, REVERSE, RIGHT Jitendra-Maryellen Gómez MD [[id number]] [facility]            HPI:  History of present illness- I had the pleasure of meeting this pleasant 73 y.o. lady in the pre op clinic prior to her elective Orthopedic surgery. The patient is new to me .     I have obtained the history by speaking to the patient and by reviewing the electronic health records.    Events leading up to surgery / History of presenting illness -    She has been troubled with severe  Rt shoulder  pain for 2 years  .   Pain increases with activity and decreases with resting.  She takes 2 Tylenol p.m. at bedtime  She has not had any previous right shoulder surgery  She had no injuries to the right shoulder but her jobs involved physical activity    Relevant health conditions of significance for the perioperative period/ History of presenting illness -    Health conditions of significance for the perioperative period      Osteoporosis  Anemia  HTN  History of intra cranial aneurysm   Acid reflux  CAD  Carotid artery stenting   Gout   Allergies     She and her  live in a single-level house  They have 3 daughters 1 living locally  She has help available after surgery  She has 8 steps to take to get to her house      Subjective/ Objective:     Chief complaint-Preoperative evaluation, Perioperative Medical management, complication reduction plan     Active cardiac conditions- none    Revised cardiac risk index predictors- none    Functional capacity -Examples of physical activity , stays active, she has been getting therapy for balance strength and stamina,  can take a flight of stairs holding on to the railing----- She can  "undertake all the above activities without  chest pain,chest tightness, Shortness of breath ,dizziness,lightheadedness making her exercise tolerance more,   than 4 Mets.       Review of Systems   Constitutional:  Negative for chills and fever.   HENT:          STOPBANG score  2/ 8        Elevated BP  Age over 50        Eyes:         No new visual changes   Respiratory:          Postnasal drip from allergies  Dry cough   No Hemoptysis   Cardiovascular:         As noted   Gastrointestinal:         No overt GI/ blood losses  Bowel movements- Regular  LMP 40's    Endocrine:        Prednisone use > 20 mg daily for 3 weeks-None   Genitourinary:  Negative for dysuria.        No urinary hesitancy    Musculoskeletal:           No unusual, muscle, joint pains   Skin:  Negative for rash.   Neurological:         No unilateral weakness   Hematological:         Current use of Anticoagulants  None   Psychiatric/Behavioral:          No Depression,Anxiety                 No anesthesia, bleeding, cardiac problems, PONV with previous surgeries/procedures.  Medications and Allergies reviewed in epic.   FH- No anesthesia,bleeding / venous thrombosis ,   in family      Physical Exam  Blood pressure 126/66, pulse 65, temperature 97.8 °F (36.6 °C), temperature source Oral, resp. rate 16, height 5' 3.5" (1.613 m), weight 54.4 kg (120 lb), SpO2 98 %.    I offered a blanket and the presence of a chaperone during physical examination   She was comfortable to proceed with the exam without the the presence of a chaperone      Physical Exam  Constitutional- Vitals - Body mass index is 20.92 kg/m².,   Vitals:    10/16/23 0926   BP: 126/66   Pulse: 65   Resp: 16   Temp: 97.8 °F (36.6 °C)     General appearance-Conscious,Coherent  Eyes- No conjunctival icterus,pupils  round  and  bilateral intra ocular lenses  ENT-Oral cavity- moist    , Hearing grossly normal   Neck- No thyromegaly ,Trachea -central, No jugular venous distension,   No Carotid " Bruit   Cardiovascular -Heart Sounds- I discussed this with her and I also had her feel her pulse this appears to be ectopic than AFib.  We discussed EKG that she felt comfortable without,  Normal ,  no murmur , and  occasional irregularity suggestive of ectopy   , No gallop rhythm   Respiratory - Normal Respiratory Effort, Normal breath sounds,  Crepitationsscattered ,  no wheeze , and  no forced expiratory wheeze    Peripheral pitting pedal edema-- none ,  varicose veins right lower extremity , and  varicose veins left lower extremity, no calf pain   Gastrointestinal -Soft abdomen, No palpable masses, Non Tender,Liver,Spleen not palpable. No-- free fluid and shifting dullness  Musculoskeletal- No finger Clubbing. Strength grossly normal   Lymphatic-No Palpable cervical, axillary,Inguinal lymphadenopathy   Psychiatric - normal effect,Orientation  Rt Dorsalis pedis pulses-palpable    Lt Dorsalis pedis pulses- palpable   Rt Posterior tibial pulses -palpable   Left posterior tibial pulses -palpable   Miscellaneous -  we discussed compression stocking use for varicose veins as it can increase the risk of blood clotting,  no renal bruit, and  examined on the chair    Investigations  Lab and Imaging have been reviewed in epic.      Review of old records- Was done and information gathered regards to events leading to surgery and health conditions of significance in the perioperative period.        Preoperative cardiac risk assessment-  The patient does not have any active cardiac conditions . Revised cardiac risk index predictors-0---.Functional capacity is more than 4 Mets. She will be undergoing a Orthopedic procedure that carries a Moderate Risk risk     Risk of a major Cardiac event ( Defined as death, myocardial infarction, or cardiac arrest at 30 days after noncardiac surgery), based on RCRI score     -3.9%       No further cardiac work up is indicated prior to proceeding with the surgery          American Society  of Anesthesiologists Physical status classification ( ASA ) class: 3     Postoperative pulmonary complication risk assessment:      ARISCAT ( Canet) risk index- risk class -  Low, if duration of surgery is under 3 hours, intermediate, if duration of surgery is over 3 hours        Assessment/Plan:     Reduced vision-Left eye  Long standing    Allergic rhinitis  She is doing good from an allergy standpoint taking Singulair and Flonase  She has no asthma    Coronary artery disease involving native coronary artery of native heart without angina pectoris  She had a coronary angiogram in 2014  She stays active and does not have any symptoms of coronary artery disease namely chest pain, chest tightness    Risk factors     She was a former smoker and she quit 22 years ago  HTN  HLD    Medication     Beta blocker   Statin     Under Cardiology care    Gets dizzy on position changes   Suggested to change positions gradually and to stay hydrated    2017  Iron def anemia due to blood loss /nose bleed  Intolerant to oral iron due to gi side effects.   injectafer 2 doses 1 week apart  RTC 3- 4 months with repeat labs.   Continue iron rich diet , d/w pt  Continue f/u with ENTfor chr maxillary sinusitis          Dyslipidemia  HLD-I  suggest continuation of statin during the entire perioperative period.    Bilateral carotid artery stenosis  April 2022    No evidence of hemodynamically significant stenosis is demonstrated in the extracranial carotid arteries.  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 Calero 1998 and she underwent a clipping done in 1998  She was very unwell and she had subarachnoid hemorrhage and had left-sided weakness and was on life support  She has done well from a subarachnoid hemorrhage standpoint since that time  In 2001 she had a routine cerebral angiography to look at the clipping and during the process had perforation  of the right carotid artery and based on her prescription probably had dissection and that led to the carotid artery stent    It appears that she has had stopped taking aspirin that she took for a long time when she had the hip fracture in May of 2023     I have openly discussed about the risks benefits of aspirin use in the situation where she has an upcoming surgery, she has had bleeding problems and she and I felt that the benefits of aspirin use outweigh the risks of aspirin use and I suggested for her to take aspirin 81 mg coated by mouth twice a day        Hypertension  Home BP readings -126- 140/ 68-84  She is very good at monitoring her blood pressure multiple times a day   She is currently taking   carvedilol ,Amlodipine   Hydrochlorothiazide  Recent BP readings in the record-  Hypertension-  Blood pressure is acceptable . I suggest continuation of -carvedilol ,Amlodipine ------- during the entire perioperative period. I suggest holding -HCTZ- on the morning of the surgery and can continue that  post operatively under blood pressure, electrolyte and renal function monitoring as long as they are acceptable.I suggest addressing pain control as uncontrolled pain can increased blood pressure     Iron deficiency anemia secondary to blood loss (chronic)  She has had history of skin cancer for which she had received liquid nitrogen to treated and to her understanding that led to significant nosebleed she had during the early part of 2023    She has no history of GI or  blood losses  Apart from the baby aspirin she was taking for carotid stenting since 2001 she was not taking any longstanding anti-inflammatory medication to account for GI blood losses    Under Hematology care     Iron def anemia due to blood loss /nose bleed  She had IV iron the last of which was last week    Her hemoglobin from August 2023 was normal and so with a hematocrit and platelet count    Osteoporosis  On supplementation and as per the  history obtained from her she has plans on starting Reclast after surgery    Gastroesophageal reflux disease without esophagitis  Doing good    Does not sound Cardiac       GERD-  I suggest continuation of the Proton pump inhibitor in the perioperative period . I suggest aspiration precautions    Gout  Gout -  I suggest continuation of the maintenance treatment for gout and to keep the patient adequately hydrated.  Please note that surgical stress ,dehydration can precipitate acute gout     She is doing good from a gout standpoint    History of hip fracture  May 2023  The events that led up to the hip fracture was that a table that was leaning on the wall fell on her  She had a hip replacement done and is doing very well    Nausea and vomiting  She gets bad nausea vomiting taking opioids without nausea medication   I suggest giving her nausea medicine to take in the event that she requires pain medication after the shoulder replacement surgery        Preventive perioperative care    Thromboembolic prophylaxis:  Her risk factors for thrombosis include surgical procedure and age.I suggest  thromboembolic prophylaxis ( mechanical/pharmacological, weighing the risk benefits of pharmacological agent use considering raquel procedural bleeding )  during the perioperative period.I suggested being active in the post operative period.      Postoperative pulmonary complication prophylaxis-Risk factors for post operative pulmonary complications include age over 65 years and ASA class >2- I suggest incentive spirometry use, early ambulation, and end tidal carbon dioxide monitoring  , oral care , head end of bed elevation      Renal complication prophylaxis-Risk factors for renal complications include hypertension . I suggest keeping her well hydrated  in the perioperative period      Surgical site Infection Prophylaxis-I  suggest appropriate antibiotic for Prophylaxis against Surgical site infections  No reported Staph  infection  Skin antibacterial discussed         This visit was focused on Preoperative evaluation, Perioperative Medical management, complication reduction plans. I suggest that the patient follows up with primary care or relevant sub specialists for ongoing health care.    I appreciate the opportunity to be involved in this patients care. Please feel free to contact me if there were any questions about this consultation.    Patient is optimized    Patient/ care giver/ Family member was instructed to call and update me about any changes to health,  medication, office visits ,testing out side of the [facility] , hospitalizations between now and surgery      Shey Arellano MD  Internal Medicine  [facility]   Cell Phone- [phone]    History of COVID - /no   COVID vaccination status -Yes    COVID screening     No fever   No SOB  No sore throat   No loss of taste or smell   No muscle aches   No nausea, vomiting , diarrhea       Checked for over-the-counter medication    I have spent --120---- minutes of time which includes, time spent to prepare to see the patient , obtaining history ,performing examination, counseling/Educating the patient , Documenting clinical information in the record   --  10/23/2023- 1723    Called to follow up , to address any concerns with the up coming surgery or any questions on Medication instructions   Unable to speak   Unable to leave a message

## 2023-10-16 NOTE — ASSESSMENT & PLAN NOTE
Home BP readings -126- 140/ 68-84  She is very good at monitoring her blood pressure multiple times a day   She is currently taking   carvedilol ,Amlodipine   Hydrochlorothiazide  Recent BP readings in the record-  Hypertension-  Blood pressure is acceptable . I suggest continuation of -carvedilol ,Amlodipine ------- during the entire perioperative period. I suggest holding -HCTZ- on the morning of the surgery and can continue that  post operatively under blood pressure, electrolyte and renal function monitoring as long as they are acceptable.I suggest addressing pain control as uncontrolled pain can increased blood pressure

## 2023-10-16 NOTE — ASSESSMENT & PLAN NOTE
She has had history of skin cancer for which she had received liquid nitrogen to treated and to her understanding that led to significant nosebleed she had during the early part of 2023    She has no history of GI or  blood losses  Apart from the baby aspirin she was taking for carotid stenting since 2001 she was not taking any longstanding anti-inflammatory medication to account for GI blood losses    Under Hematology care     Iron def anemia due to blood loss /nose bleed  She had IV iron the last of which was last week    Her hemoglobin from August 2023 was normal and so with a hematocrit and platelet count

## 2023-10-16 NOTE — HPI
History of present illness- I had the pleasure of meeting this pleasant 73 y.o. lady in the pre op clinic prior to her elective Orthopedic surgery. The patient is new to me .     I have obtained the history by speaking to the patient and by reviewing the electronic health records.    Events leading up to surgery / History of presenting illness -    She has been troubled with severe  Rt shoulder  pain for 2 years  .   Pain increases with activity and decreases with resting.  She takes 2 Tylenol p.m. at bedtime  She has not had any previous right shoulder surgery  She had no injuries to the right shoulder but her jobs involved physical activity    Relevant health conditions of significance for the perioperative period/ History of presenting illness -    Health conditions of significance for the perioperative period      Osteoporosis  Anemia  HTN  History of intra cranial aneurysm   Acid reflux  CAD  Carotid artery stenting   Gout   Allergies     She and her  live in a single-level house  They have 3 daughters 1 living locally  She has help available after surgery  She has 8 steps to take to get to her house

## 2023-10-16 NOTE — ASSESSMENT & PLAN NOTE
She had a coronary angiogram in 2014  She stays active and does not have any symptoms of coronary artery disease namely chest pain, chest tightness    Risk factors     She was a former smoker and she quit 22 years ago  HTN  HLD    Medication     Beta blocker   Statin     Under Cardiology care    Gets dizzy on position changes   Suggested to change positions gradually and to stay hydrated    2017  Iron def anemia due to blood loss /nose bleed  Intolerant to oral iron due to gi side effects.   injectafer 2 doses 1 week apart  RTC 3- 4 months with repeat labs.   Continue iron rich diet , d/w pt  Continue f/u with ENTfor chr maxillary sinusitis

## 2023-10-16 NOTE — ASSESSMENT & PLAN NOTE
May 2023  The events that led up to the hip fracture was that a table that was leaning on the wall fell on her  She had a hip replacement done and is doing very well

## 2023-10-16 NOTE — ASSESSMENT & PLAN NOTE
Gout -  I suggest continuation of the maintenance treatment for gout and to keep the patient adequately hydrated.  Please note that surgical stress ,dehydration can precipitate acute gout     She is doing good from a gout standpoint

## 2023-10-16 NOTE — ASSESSMENT & PLAN NOTE
On supplementation and as per the history obtained from her she has plans on starting Reclast after surgery

## 2023-10-16 NOTE — ASSESSMENT & PLAN NOTE
Doing good    Does not sound Cardiac       GERD-  I suggest continuation of the Proton pump inhibitor in the perioperative period . I suggest aspiration precautions

## 2023-10-16 NOTE — ASSESSMENT & PLAN NOTE
April 2022     No evidence of hemodynamically significant stenosis is demonstrated in the extracranial carotid arteries.  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 Calero 1998 and she underwent a clipping done in 1998  She was very unwell and she had subarachnoid hemorrhage and had left-sided weakness and was on life support  She has done well from a subarachnoid hemorrhage standpoint since that time  In 2001 she had a routine cerebral angiography to look at the clipping and during the process had perforation of the right carotid artery and based on her prescription probably had dissection and that led to the carotid artery stent    It appears that she has had stopped taking aspirin that she took for a long time when she had the hip fracture in May of 2023     I have openly discussed about the risks benefits of aspirin use in the situation where she has an upcoming surgery, she has had bleeding problems and she and I felt that the benefits of aspirin use outweigh the risks of aspirin use and I suggested for her to take aspirin 81 mg coated by mouth twice a day

## 2023-10-16 NOTE — ASSESSMENT & PLAN NOTE
She gets bad nausea vomiting taking opioids without nausea medication   I suggest giving her nausea medicine to take in the event that she requires pain medication after the shoulder replacement surgery

## 2023-10-18 ENCOUNTER — PATIENT MESSAGE (OUTPATIENT)
Dept: INTERNAL MEDICINE | Facility: CLINIC | Age: 73
End: 2023-10-18
Payer: MEDICARE

## 2023-10-23 DIAGNOSIS — Z98.890 POST-OPERATIVE STATE: Primary | ICD-10-CM

## 2023-10-23 PROBLEM — M75.101 RIGHT ROTATOR CUFF TEAR ARTHROPATHY: Status: ACTIVE | Noted: 2023-10-23

## 2023-10-23 PROBLEM — M12.811 RIGHT ROTATOR CUFF TEAR ARTHROPATHY: Status: ACTIVE | Noted: 2023-10-23

## 2023-10-23 RX ORDER — DEXTROSE MONOHYDRATE AND SODIUM CHLORIDE 5; .9 G/100ML; G/100ML
INJECTION, SOLUTION INTRAVENOUS CONTINUOUS
Status: CANCELLED | OUTPATIENT
Start: 2023-10-23

## 2023-10-23 RX ORDER — MORPHINE SULFATE 10 MG/ML
3 INJECTION INTRAMUSCULAR; INTRAVENOUS; SUBCUTANEOUS
Status: CANCELLED | OUTPATIENT
Start: 2023-10-23

## 2023-10-23 RX ORDER — MUPIROCIN 20 MG/G
1 OINTMENT TOPICAL 2 TIMES DAILY
Status: CANCELLED | OUTPATIENT
Start: 2023-10-23 | End: 2023-10-28

## 2023-10-23 RX ORDER — METHOCARBAMOL 750 MG/1
750 TABLET, FILM COATED ORAL 3 TIMES DAILY PRN
Qty: 30 TABLET | Refills: 0 | Status: SHIPPED | OUTPATIENT
Start: 2023-10-23 | End: 2023-10-24

## 2023-10-23 RX ORDER — ACETAMINOPHEN 500 MG
1000 TABLET ORAL EVERY 6 HOURS
Status: CANCELLED | OUTPATIENT
Start: 2023-10-23 | End: 2023-10-25

## 2023-10-23 RX ORDER — FAMOTIDINE 20 MG/1
20 TABLET, FILM COATED ORAL 2 TIMES DAILY
Status: CANCELLED | OUTPATIENT
Start: 2023-10-23

## 2023-10-23 RX ORDER — OXYCODONE HYDROCHLORIDE 5 MG/1
5 TABLET ORAL
Status: CANCELLED | OUTPATIENT
Start: 2023-10-23

## 2023-10-23 RX ORDER — ONDANSETRON 8 MG/1
8 TABLET, ORALLY DISINTEGRATING ORAL EVERY 8 HOURS PRN
Status: CANCELLED | OUTPATIENT
Start: 2023-10-23

## 2023-10-23 RX ORDER — SODIUM CHLORIDE 0.9 % (FLUSH) 0.9 %
10 SYRINGE (ML) INJECTION
Status: CANCELLED | OUTPATIENT
Start: 2023-10-23

## 2023-10-23 RX ORDER — DEXTROMETHORPHAN HYDROBROMIDE, GUAIFENESIN 5; 100 MG/5ML; MG/5ML
650 LIQUID ORAL
Qty: 120 TABLET | Refills: 0 | Status: SHIPPED | OUTPATIENT
Start: 2023-10-23

## 2023-10-23 RX ORDER — CELECOXIB 200 MG/1
200 CAPSULE ORAL DAILY
Qty: 30 CAPSULE | Refills: 0 | Status: ON HOLD | OUTPATIENT
Start: 2023-10-23 | End: 2023-10-31 | Stop reason: SDUPTHER

## 2023-10-23 RX ORDER — PROCHLORPERAZINE EDISYLATE 5 MG/ML
5 INJECTION INTRAMUSCULAR; INTRAVENOUS EVERY 6 HOURS PRN
Status: CANCELLED | OUTPATIENT
Start: 2023-10-23

## 2023-10-23 RX ORDER — POLYETHYLENE GLYCOL 3350 17 G/17G
17 POWDER, FOR SOLUTION ORAL DAILY
Status: CANCELLED | OUTPATIENT
Start: 2023-10-23

## 2023-10-23 RX ORDER — AMOXICILLIN 250 MG
1 CAPSULE ORAL 2 TIMES DAILY
Status: CANCELLED | OUTPATIENT
Start: 2023-10-23

## 2023-10-23 RX ORDER — CELECOXIB 200 MG/1
200 CAPSULE ORAL DAILY
Status: CANCELLED | OUTPATIENT
Start: 2023-10-24

## 2023-10-23 RX ORDER — TALC
6 POWDER (GRAM) TOPICAL NIGHTLY PRN
Status: CANCELLED | OUTPATIENT
Start: 2023-10-23

## 2023-10-23 RX ORDER — PREGABALIN 75 MG/1
75 CAPSULE ORAL NIGHTLY
Status: CANCELLED | OUTPATIENT
Start: 2023-10-23

## 2023-10-23 RX ORDER — NALOXONE HCL 0.4 MG/ML
0.02 VIAL (ML) INJECTION
Status: CANCELLED | OUTPATIENT
Start: 2023-10-23

## 2023-10-23 RX ORDER — OXYCODONE HYDROCHLORIDE 5 MG/1
5 TABLET ORAL
Qty: 50 TABLET | Refills: 0 | Status: SHIPPED | OUTPATIENT
Start: 2023-10-23 | End: 2023-10-28

## 2023-10-24 ENCOUNTER — TELEPHONE (OUTPATIENT)
Dept: ORTHOPEDICS | Facility: CLINIC | Age: 73
End: 2023-10-24
Payer: MEDICARE

## 2023-10-24 ENCOUNTER — PATIENT MESSAGE (OUTPATIENT)
Dept: SURGERY | Facility: OTHER | Age: 73
End: 2023-10-24
Payer: MEDICARE

## 2023-10-24 ENCOUNTER — HOSPITAL ENCOUNTER (OUTPATIENT)
Dept: PREADMISSION TESTING | Facility: OTHER | Age: 73
Discharge: HOME | End: 2023-10-24
Attending: ORTHOPAEDIC SURGERY
Payer: MEDICARE

## 2023-10-24 VITALS
HEIGHT: 64 IN | TEMPERATURE: 98 F | SYSTOLIC BLOOD PRESSURE: 128 MMHG | RESPIRATION RATE: 18 BRPM | DIASTOLIC BLOOD PRESSURE: 71 MMHG | BODY MASS INDEX: 20.49 KG/M2 | OXYGEN SATURATION: 95 % | WEIGHT: 120 LBS | HEART RATE: 78 BPM

## 2023-10-24 LAB
ABO + RH BLD: NORMAL
BLD GP AB SCN CELLS X3 SERPL QL: NORMAL
SPECIMEN OUTDATE: NORMAL

## 2023-10-24 PROCEDURE — 86850 RBC ANTIBODY SCREEN: CPT

## 2023-10-24 PROCEDURE — 36415 COLL VENOUS BLD VENIPUNCTURE: CPT

## 2023-10-24 RX ORDER — SODIUM CHLORIDE, SODIUM LACTATE, POTASSIUM CHLORIDE, CALCIUM CHLORIDE 600; 310; 30; 20 MG/100ML; MG/100ML; MG/100ML; MG/100ML
INJECTION, SOLUTION INTRAVENOUS CONTINUOUS
Status: CANCELLED | OUTPATIENT
Start: 2023-10-24

## 2023-10-24 RX ORDER — LIDOCAINE HYDROCHLORIDE 10 MG/ML
0.5 INJECTION, SOLUTION EPIDURAL; INFILTRATION; INTRACAUDAL; PERINEURAL ONCE
Status: CANCELLED | OUTPATIENT
Start: 2023-10-24 | End: 2023-10-24

## 2023-10-24 NOTE — DISCHARGE INSTRUCTIONS
Information to Prepare you for your Surgery    PRE-ADMIT TESTING -  [phone]    [address] [facility]          Your surgery has been scheduled at [facility]. We are pleased to have the opportunity to serve you. For Further Information please call [phone].    On the day of surgery please report to the Information Desk on the 1st floor.    CONTACT YOUR PHYSICIAN'S OFFICE THE DAY PRIOR TO YOUR SURGERY TO OBTAIN YOUR ARRIVAL TIME.     The evening before surgery do not eat anything after 9 p.m. ( this includes hard candy, chewing gum and mints).  You may only have GATORADE, POWERADE AND WATER  from 9 p.m. until you leave your home.   DO NOT DRINK ANY LIQUIDS ON THE WAY TO THE HOSPITAL.      Why does your anesthesiologist allow you to drink Gatorade/Powerade before surgery?  Gatorade/Powerade helps to increase your comfort before surgery and to decrease your nausea after surgery. The carbohydrates in Gatorade/Powerade help reduce your body's stress response to surgery.  If you are a diabetic-drink only water prior to surgery.       Patients may have 2 visitors pre and post procedure. Only 2 visitors will be allowed in the Surgical building with the patient. No one under the age of 12 will be allowed into the facility.    SPECIAL MEDICATION INSTRUCTIONS: TAKE medications checked off by the Anesthesiologist on your Medication List.    Angiogram Patients: Take medications as instructed by your physician, including aspirin.     Surgery Patients:  If you take ASPIRIN - Your PHYSICIAN/SURGEON will need to inform you IF/OR when you need to stop taking aspirin prior to your surgery.     The week prior to surgery do not ot take any medications containing IBUPROFEN or NSAIDS ( Advil, Motrin, Goodys, BC, Aleve, Naproxen etc) If you are not sure if you should take a medicine please call your surgeon's office.  You may take Tylenol.     Do Not Wear any make-up (especially eye make-up) to  surgery. Please remove any false eyelashes or eyelash extensions. If you arrive the day of surgery with makeup/eyelashes on you will be required to remove prior to surgery. (There is a risk of corneal abrasions if eye makeup/eyelash extensions are not removed)      Leave all valuables at home.   Do Not wear any jewelry or watches, including any metal in body piercings. Jewelry must be removed prior to coming to the hospital.  There is a possibility that rings that are unable to be removed may be cut off if they are on the surgical extremity.    Please remove all hair extensions, wigs, clips and any other metal accessories/ ornaments from your hair.  These items may pose a flammable/fire risk in Surgery and must be removed.    Do not shave your surgical area at least 5 days prior to your surgery. The surgical prep will be performed at the hospital according to Infection Control regulations.    Contact Lens must be removed before surgery. Either do not wear the contact lens or bring a case and solution for storage.  Please bring a container for eyeglasses or dentures as required.  Bring any paperwork your physician has provided, such as consent forms,  history and physicals, doctor's orders, etc.   Bring comfortable clothes that are loose fitting to wear upon discharge. Take into consideration the type of surgery being performed.  Maintain your diet as advised per your physician the day prior to surgery.      Adequate rest the night before surgery is advised.   Park in the Parking lot behind the hospital or in the [facility] across the street from the parking lot. Parking is complimentary.  If you will be discharged the same day as your procedure, please arrange for a responsible adult to drive you home or to accompany you if traveling by taxi.   YOU WILL NOT BE PERMITTED TO DRIVE OR TO LEAVE THE HOSPITAL ALONE AFTER SURGERY.   If you are being discharged the same day, it is strongly recommended that you  arrange for someone to remain with you for the first 24 hrs following your surgery.    The Surgeon will speak to your family/visitor after your surgery regarding the outcome of your surgery and post op care.  The Surgeon may speak to you after your surgery, but there is a possibility you may not remember the details.  Please check with your family members regarding the conversation with the Surgeon.    We strongly recommend whoever is bringing you home be present for discharge instructions.  This will ensure a thorough understanding for your post op home care.    ALL CHILDREN MUST ALWAYS BE ACCOMPANIED BY AN ADULT.    Visitors-Refer to current Visitor policy handouts.    Thank you for your cooperation.  The Staff of [facility].            Bathing Instructions with Hibiclens    Shower the evening before and morning of your procedure with Chlorhexidine (Hibiclens)  do not use Chlorhexidine on your face or genitals. Do not get in your eyes.  Wash your face with water and your regular face wash/soap  Use your regular shampoo  Apply Chlorhexidine (Hibiclens) directly on your skin or on a wet washcloth and wash gently. When showering: Move away from the shower stream when applying Chlorhexidine (Hibiclens) to avoid rinsing off too soon.  Rinse thoroughly with warm water  Do not dilute Chlorhexidine (Hibiclens)   Dry off as usual, do not use any deodorant, powder, body lotions, perfume, after shave or cologne.

## 2023-10-24 NOTE — TELEPHONE ENCOUNTER
Spoke c pt. Informed pt of 9:00 arrival time for surgery at the [facility]. Reminded pt of NPO status. Pt expressed understanding & was thankful.

## 2023-10-25 ENCOUNTER — HOSPITAL ENCOUNTER (OUTPATIENT)
Facility: OTHER | Age: 73
Discharge: HOME | End: 2023-10-25
Attending: ORTHOPAEDIC SURGERY | Admitting: ORTHOPAEDIC SURGERY
Payer: MEDICARE

## 2023-10-25 ENCOUNTER — PATIENT MESSAGE (OUTPATIENT)
Dept: SURGERY | Facility: OTHER | Age: 73
End: 2023-10-25
Payer: MEDICARE

## 2023-10-25 VITALS
HEART RATE: 97 BPM | RESPIRATION RATE: 18 BRPM | BODY MASS INDEX: 20.47 KG/M2 | OXYGEN SATURATION: 96 % | SYSTOLIC BLOOD PRESSURE: 151 MMHG | TEMPERATURE: 99 F | WEIGHT: 119.94 LBS | DIASTOLIC BLOOD PRESSURE: 74 MMHG | HEIGHT: 64 IN

## 2023-10-25 DIAGNOSIS — M12.811 RIGHT ROTATOR CUFF TEAR ARTHROPATHY: Primary | ICD-10-CM

## 2023-10-25 DIAGNOSIS — M75.101 RIGHT ROTATOR CUFF TEAR ARTHROPATHY: Primary | ICD-10-CM

## 2023-10-25 PROCEDURE — 99499 UNLISTED E&M SERVICE: CPT | Mod: HCNC,,, | Performed by: ORTHOPAEDIC SURGERY

## 2023-10-25 PROCEDURE — 63600175 PHARM REV CODE 636 W HCPCS

## 2023-10-25 PROCEDURE — 25000003 PHARM REV CODE 250

## 2023-10-25 PROCEDURE — 99499 NO LOS: ICD-10-PCS | Mod: HCNC,,, | Performed by: ORTHOPAEDIC SURGERY

## 2023-10-25 PROCEDURE — 63600175 PHARM REV CODE 636 W HCPCS: Performed by: ANESTHESIOLOGY

## 2023-10-25 RX ORDER — CLINDAMYCIN PHOSPHATE 900 MG/50ML
900 INJECTION, SOLUTION INTRAVENOUS
Status: DISCONTINUED | OUTPATIENT
Start: 2023-10-25 | End: 2023-10-25 | Stop reason: RX

## 2023-10-25 RX ORDER — LIDOCAINE HYDROCHLORIDE 10 MG/ML
0.5 INJECTION, SOLUTION EPIDURAL; INFILTRATION; INTRACAUDAL; PERINEURAL ONCE
Status: DISCONTINUED | OUTPATIENT
Start: 2023-10-25 | End: 2023-10-25 | Stop reason: HOSPADM

## 2023-10-25 RX ORDER — SODIUM CHLORIDE, SODIUM LACTATE, POTASSIUM CHLORIDE, CALCIUM CHLORIDE 600; 310; 30; 20 MG/100ML; MG/100ML; MG/100ML; MG/100ML
INJECTION, SOLUTION INTRAVENOUS CONTINUOUS
Status: DISCONTINUED | OUTPATIENT
Start: 2023-10-25 | End: 2023-10-25 | Stop reason: HOSPADM

## 2023-10-25 RX ORDER — CEFAZOLIN SODIUM 1 G/3ML
2 INJECTION, POWDER, FOR SOLUTION INTRAMUSCULAR; INTRAVENOUS
Status: DISCONTINUED | OUTPATIENT
Start: 2023-10-25 | End: 2023-10-25 | Stop reason: HOSPADM

## 2023-10-25 RX ORDER — MUPIROCIN 20 MG/G
OINTMENT TOPICAL
Status: DISPENSED | OUTPATIENT
Start: 2023-10-25

## 2023-10-25 RX ORDER — ACETAMINOPHEN 500 MG
1000 TABLET ORAL
Status: COMPLETED | OUTPATIENT
Start: 2023-10-25 | End: 2023-10-25

## 2023-10-25 RX ORDER — CELECOXIB 200 MG/1
400 CAPSULE ORAL
Status: COMPLETED | OUTPATIENT
Start: 2023-10-25 | End: 2023-10-25

## 2023-10-25 RX ADMIN — VANCOMYCIN HYDROCHLORIDE 1000 MG: 1 INJECTION, POWDER, LYOPHILIZED, FOR SOLUTION INTRAVENOUS at 11:10

## 2023-10-25 RX ADMIN — CELECOXIB 400 MG: 200 CAPSULE ORAL at 10:10

## 2023-10-25 RX ADMIN — MUPIROCIN: 20 OINTMENT TOPICAL at 10:10

## 2023-10-25 RX ADMIN — SODIUM CHLORIDE, POTASSIUM CHLORIDE, SODIUM LACTATE AND CALCIUM CHLORIDE: 600; 310; 30; 20 INJECTION, SOLUTION INTRAVENOUS at 11:10

## 2023-10-25 RX ADMIN — ACETAMINOPHEN 1000 MG: 500 TABLET ORAL at 10:10

## 2023-10-25 NOTE — OR NURSING
Pt returned to [facility] via stretcher with LORETTA Oates RN.  Iv removed.  Pt's daughter at bedside, requesting to speak with anesthesia.  Dr. Andrea called.

## 2023-10-25 NOTE — H&P
CC: RIGHT shoulder pain     73 y.o. Female with a history of right shoulder pain and weakness. Felt pop approximately 8 months ago. Was seen in ortho sports clinic, where a CT arthrogram was ordered showing massive full thickness rotator cuff tear with retraction and degenerative changes of the glenohumeral joint. Patient was referred to Dr. Ham for discussion of reverse shoulder arthroplasty.   She states that the pain is severe and not responding to any conservative care.      She reports that the pain and weakness is worse with overhead activity. It also bothers her at night.    Is affecting ADLs.  Pain is 10/10 at it's worst.      Past Medical History:   Diagnosis Date    Allergic rhinitis     Amblyopia     Carotid stenosis     Coronary atherosclerosis     [facility] 6/2014: 20% mLAD, 50% mCx, 20%, mRCA    Dyslipidemia     ESBL (extended spectrum beta-lactamase) producing bacteria infection     UTI    Hypertension     Nausea and vomiting 05/26/2017    SAH (subarachnoid hemorrhage) 08/24/2016 1999, s/p clipping    Subarachnoid hemorrhage due to ruptured aneurysm 1999       Past Surgical History:   Procedure Laterality Date    ADENOIDECTOMY      ANTERIOR CRUCIATE LIGAMENT REPAIR  2012    APPENDECTOMY      CATARACT EXTRACTION W/  INTRAOCULAR LENS IMPLANT Right 11/07/2022    Procedure: EXTRACTION, CATARACT, WITH IOL INSERTION;  Surgeon: Higinio Cristina MD;  Location: [facility];  Service: Ophthalmology;  Laterality: Right;    CATARACT EXTRACTION W/  INTRAOCULAR LENS IMPLANT Left 11/21/2022    Procedure: EXTRACTION, CATARACT, WITH IOL INSERTION;  Surgeon: Higinio Cristina MD;  Location: [facility];  Service: Ophthalmology;  Laterality: Left;    CEREBRAL ANEURYSM REPAIR  1999    clip    DENTAL SURGERY      EYE SURGERY Bilateral     cataract removal and lens implants    HIP ARTHROPLASTY Left 05/28/2023    Procedure: TOTAL HIP ARTHROPLASTY;  Surgeon: Juan Wan MD;  Location: [address];  Service:  Orthopedics;  Laterality: Left;    TONSILLECTOMY         Family History   Problem Relation Age of Onset    Hypertension Mother     Aneurysm Mother     Hypertension Father     Alcohol abuse Father     Kidney disease Brother     Heart disease Brother     Gout Brother     No Known Problems Daughter     No Known Problems Daughter     No Known Problems Daughter          Current Facility-Administered Medications:     ceFAZolin injection 2 g, 2 g, Intravenous, Once Pre-Op, Maryellen Valderrama MD    lactated ringers infusion, , Intravenous, Continuous, Matteo Wagner MD, Last Rate: 10 mL/hr at 10/25/23 1105, New Bag at 10/25/23 1105    LIDOcaine (PF) 10 mg/ml (1%) injection 5 mg, 0.5 mL, Intradermal, Once, Matteo Wagner MD    mupirocin 2 % ointment, , Nasal, On Call Procedure, Kang Diaz MD, Given at 10/25/23 1045    tranexamic acid (CYKLOKAPRON) 1,000 mg in sodium chloride 0.9 % 100 mL IVPB (MB+), 1,000 mg, Intravenous, Once, Kang Diaz MD    tranexamic acid (CYKLOKAPRON) 1,000 mg in sodium chloride 0.9 % 100 mL IVPB (MB+), 1,000 mg, Intravenous, Once, Kang Diaz MD    vancomycin (VANCOCIN) 1,000 mg in dextrose 5 % (D5W) 250 mL IVPB (Vial-Mate), 1,000 mg, Intravenous, On Call Procedure, Kang Diaz MD, Last Rate: 166.7 mL/hr at 10/25/23 1108, 1,000 mg at 10/25/23 1108    Review of patient's allergies indicates:  No Known Allergies       REVIEW OF SYSTEMS:  Constitution: Negative. Negative for chills, fever and night sweats.   HENT: Negative for congestion and headaches.    Eyes: Negative for blurred vision, left vision loss and right vision loss.   Cardiovascular: Negative for chest pain and syncope.   Respiratory: Negative for cough and shortness of breath.    Endocrine: Negative for polydipsia, polyphagia and polyuria.   Hematologic/Lymphatic: Negative for bleeding problem. Does not bruise/bleed easily.   Skin: Negative for dry skin, itching and rash.   Musculoskeletal: Negative for falls.   "Positive for right shoulder pain and muscle weakness.   Gastrointestinal: Negative for abdominal pain and bowel incontinence.   Genitourinary: Negative for bladder incontinence and nocturia.   Neurological: Negative for disturbances in coordination, loss of balance and seizures.   Psychiatric/Behavioral: Negative for depression. The patient does not have insomnia.    Allergic/Immunologic: Negative for hives and persistent infections.      PHYSICAL EXAMINATION:  Vitals:  BP (!) 151/74 (BP Location: Left arm, Patient Position: Sitting)   Pulse 97   Temp 98.5 °F (36.9 °C) (Oral)   Resp 18   Ht 5' 3.5" (1.613 m)   Wt 54.4 kg (119 lb 14.9 oz)   SpO2 96%   Breastfeeding No   BMI 20.91 kg/m²    General: The patient is alert and oriented x 3.  Mood is pleasant.  Observation of ears, eyes and nose reveal no gross abnormalities.  No labored breathing observed.  Gait is coordinated. Patient can toe walk and heel walk without difficulty.      RIGHT SHOULDER / UPPER EXTREMITY EXAM    OBSERVATION:     Swelling  none  Deformity  none   Discoloration  none   Scapular winging none   Scars   none  Atrophy  none    TENDERNESS / CREPITUS (T/C):          T/C      T/C   Clavicle   -/-  SUPRAspinatus    +/-     AC Jt.    -/+  INFRAspinatus  -/-    SC Jt.    -/-  Deltoid    -/-      G. Tuberosity  -/-  LH BICEP groove  +/-   Acromion:  -/-  Midline Neck   -/-     Scapular Spine -/-  Trapezium   -/-   SMA Scapula  -/-  GH jt. line - post  +/+     Scapulothoracic  -/-         ROM: (* = with pain)  Left shoulder   Right shoulder        AROM (PROM)   AROM (PROM)   FE    170° (175°)     90° (160°)     ER at 0°    60°  (65°)    30°  (55°)   ER at 90° ABD  90°  (90°)    40°  (70°)   IR at 90°  ABD   NA  (40°)     NA  (40°)      IR (spine level)   T10     L5    STRENGTH: (* = with pain) Left shoulder   Right " shoulder   SCAPTION   5/5    3/5*    IR    5/5 4/5*   ER    5/5    3/5*   BICEPS   5/5 5/5   Deltoid    5/5 5/5     SIGNS:  Painful side       NEER   +   OEMILIES  neg    LEA   +    SPEEDS  pos     DROP ARM   -   BELLY PRESS neg   Superior escape none    LIFT-OFF  neg   X-Body ADD    neg    MOVING VALGUS neg        STABILITY TESTING    Left shoulder   Right shoulder    Translation     Anterior  up face    up face    Posterior  up face    up face    Sulcus   < 10mm   < 10 mm     Signs   Apprehension   neg      neg       Relocation   no change     no change      Jerk test  neg     neg    EXTREMITY NEURO-VASCULAR EXAM:    Sensation grossly intact to light touch all dermatomal regions.    DTR 2+ Biceps, Triceps, BR and Negative Sids sign   Grossly intact motor function at Elbow, Wrist and Hand   Distal pulses radial and ulnar 2+, brisk cap refill, symmetric.      NECK:  Painless FROM and spinous processes non-tender. Negative Spurlings sign.        XRAYS:  Xrays including AP, Outlet and Axillary Lateral of shoulder are ordered / images reviewed by me:   No fracture dislocation or other acute osseous abnormality   Proximal migration of humeral head   Moderate degenerative changes of AC joint and GH joint    CT Arthrogram:  Full thickness tear of supraspinatus and infraspinatus. Possible biceps rupture. Moderate DJD. Findings consistent with rotator cuff arthropathy          ASSESSMENT:   Right shoulder pain:  1. Right rotator cuff tear arthropathy        PLAN:      Reverse total shoulder arthroplasty scheduled for 10/25/23 with Dr. Valderrama    Risks and complications were discussed including but not limited to the risks of anesthetic complications, infection, wound healing complications, instability, hardware failure, pain, compartment syndrome, decreased range of motion, stiffness, DVT, pulmonary embolism, perioperative medical risks (cardiac, pulmonary, renal, neurologic), and death among others  were discussed. No guarantees were made and the patient and family elect to proceed. All questions were answered.  No guarantees were implied or stated. Informed consent was obtained.

## 2023-10-25 NOTE — INTERVAL H&P NOTE
The patient has been examined and the H&P has been reviewed:    I concur with the findings and no changes have occurred since H&P was written.    Surgery risks, benefits and alternative options discussed and understood by patient/family.          Active Hospital Problems    Diagnosis  POA    *Right rotator cuff tear arthropathy [M75.101, M12.811]  Yes      Resolved Hospital Problems   No resolved problems to display.

## 2023-10-25 NOTE — HOSPITAL COURSE
On 10/25/23, the patient arrived to the [facility] for proper pre-operative management.  Upon completion of pre-operative preparation, the patient was taken back to the operative theatre. Right reverse total shoulder arthroplasty was performed without complication and the patient was transported to the post anesthesia care unit in stable condition.  After appropriate recovery from the anaesthetic agents used during the surgery, the patient was then transported to the hospital inpatient floor.  The interim of the hospital stay from arrival on the floor up to discharge has been uncomplicated. The patient has tolerated regular diet.  The patient's pain has been controlled using a multimodal approach. Currently, the patient's pain is well controlled on an oral regimen.  The patient has been voiding without difficulty.  The patient began participation in physical therapy after surgery and has progressed throughout the entire hospital stay.  Currently, the patient's progress is sufficient to allow the them to be discharged to home safely.  The patient agrees with this assessment and desires a discharge today.

## 2023-10-25 NOTE — ASSESSMENT & PLAN NOTE
Jessica Floyd is a 73 y.o. female is s/p right RTSA on 10/25/23 by Dr. Valderrama    Surgical dressing C/D/I, polar ice in place  Pain control: multimodal, PNC  PT/OT: NWB FLY, no shoulder ROM  DVT PPx: ASA 81 BID, FCDs at all times when not ambulating  Abx: postop Ancef  Drain: none  [place]: none    Dispo: plan to dc to home today once cleared by PT/OT

## 2023-10-25 NOTE — SUBJECTIVE & OBJECTIVE
"Principal Problem:Right rotator cuff tear arthropathy    Principal Orthopedic Problem: same as above, s/p right RTSA on 10/25/23    Interval History: Patient seen and examined at bedside. NAEON. Patient doing well. No complaints. Pain well controlled. Patient anticipates mobilizing with PT.      Review of patient's allergies indicates:  No Known Allergies    Current Facility-Administered Medications   Medication    ceFAZolin injection 2 g    lactated ringers infusion    LIDOcaine (PF) 10 mg/ml (1%) injection 5 mg    mupirocin 2 % ointment    tranexamic acid (CYKLOKAPRON) 1,000 mg in sodium chloride 0.9 % 100 mL IVPB (MB+)    tranexamic acid (CYKLOKAPRON) 1,000 mg in sodium chloride 0.9 % 100 mL IVPB (MB+)    vancomycin (VANCOCIN) 1,000 mg in dextrose 5 % (D5W) 250 mL IVPB (Vial-Mate)     Objective:     Vital Signs (Most Recent):  Temp: 98.5 °F (36.9 °C) (10/25/23 1015)  Pulse: 97 (10/25/23 1015)  Resp: 18 (10/25/23 1015)  BP: (!) 151/74 (10/25/23 1015)  SpO2: 96 % (10/25/23 1015) Vital Signs (24h Range):  Temp:  [98.5 °F (36.9 °C)] 98.5 °F (36.9 °C)  Pulse:  [97] 97  Resp:  [18] 18  SpO2:  [96 %] 96 %  BP: (151)/(74) 151/74     Weight: 54.4 kg (119 lb 14.9 oz)  Height: 5' 3.5" (161.3 cm)  Body mass index is 20.91 kg/m².    No intake or output data in the 24 hours ending 10/25/23 1146     Ortho/SPM Exam  AAOx4  NAD  Reg rate  No increased WOB    RUE:  Dressing c/d/I, PNC infusing  FROM elbow and wrist  SILT M/U/R  Motor intact AIN/PIN/M/U/R  WWP extremities        Significant Labs: All pertinent labs within the past 24 hours have been reviewed.    Significant Imaging: I have reviewed all pertinent imaging results/findings.  "

## 2023-10-25 NOTE — HPI
73 y.o. Female with a history of right shoulder pain and weakness. Felt pop approximately 8 months ago. Was seen in ortho sports clinic, where a CT arthrogram was ordered showing massive full thickness rotator cuff tear with retraction and degenerative changes of the glenohumeral joint. Patient was referred to Dr. Ham for discussion of reverse shoulder arthroplasty.   She states that the pain is severe and not responding to any conservative care.       She reports that the pain and weakness is worse with overhead activity. It also bothers her at night.     Is affecting ADLs.  Pain is 10/10 at it's worst.

## 2023-10-26 RX ORDER — PROCHLORPERAZINE EDISYLATE 5 MG/ML
5 INJECTION INTRAMUSCULAR; INTRAVENOUS EVERY 30 MIN PRN
Status: CANCELLED | OUTPATIENT
Start: 2023-10-25

## 2023-10-26 RX ORDER — DIPHENHYDRAMINE HYDROCHLORIDE 50 MG/ML
12.5 INJECTION INTRAMUSCULAR; INTRAVENOUS EVERY 30 MIN PRN
Status: CANCELLED | OUTPATIENT
Start: 2023-10-25

## 2023-10-26 RX ORDER — MEPERIDINE HYDROCHLORIDE 25 MG/ML
12.5 INJECTION INTRAMUSCULAR; INTRAVENOUS; SUBCUTANEOUS ONCE AS NEEDED
Status: CANCELLED | OUTPATIENT
Start: 2023-10-25 | End: 2023-10-26

## 2023-10-26 RX ORDER — HYDROMORPHONE HYDROCHLORIDE 2 MG/ML
0.4 INJECTION, SOLUTION INTRAMUSCULAR; INTRAVENOUS; SUBCUTANEOUS EVERY 5 MIN PRN
Status: CANCELLED | OUTPATIENT
Start: 2023-10-25

## 2023-10-26 RX ORDER — SODIUM CHLORIDE 0.9 % (FLUSH) 0.9 %
3 SYRINGE (ML) INJECTION
Status: CANCELLED | OUTPATIENT
Start: 2023-10-25

## 2023-10-26 RX ORDER — OXYCODONE HYDROCHLORIDE 5 MG/1
5 TABLET ORAL EVERY 4 HOURS PRN
Status: CANCELLED | OUTPATIENT
Start: 2023-10-25

## 2023-10-27 ENCOUNTER — PATIENT MESSAGE (OUTPATIENT)
Dept: PRIMARY CARE CLINIC | Facility: CLINIC | Age: 73
End: 2023-10-27
Payer: MEDICARE

## 2023-10-28 ENCOUNTER — HOSPITAL ENCOUNTER (INPATIENT)
Facility: HOSPITAL | Age: 73
LOS: 1 days | Discharge: HOME HEALTH | DRG: 189 | End: 2023-10-31
Attending: EMERGENCY MEDICINE | Admitting: STUDENT IN AN ORGANIZED HEALTH CARE EDUCATION/TRAINING PROGRAM
Payer: MEDICARE

## 2023-10-28 DIAGNOSIS — R07.9 CHEST PAIN: ICD-10-CM

## 2023-10-28 DIAGNOSIS — I50.9 HEART FAILURE: ICD-10-CM

## 2023-10-28 DIAGNOSIS — R06.02 SHORTNESS OF BREATH: ICD-10-CM

## 2023-10-28 DIAGNOSIS — R06.02 SOB (SHORTNESS OF BREATH): ICD-10-CM

## 2023-10-28 DIAGNOSIS — I27.20 PULMONARY HTN: Primary | ICD-10-CM

## 2023-10-28 DIAGNOSIS — R00.0 TACHYCARDIA: ICD-10-CM

## 2023-10-28 DIAGNOSIS — Z98.890 POST-OPERATIVE STATE: ICD-10-CM

## 2023-10-28 DIAGNOSIS — J96.01 ACUTE HYPOXIC RESPIRATORY FAILURE: ICD-10-CM

## 2023-10-28 DIAGNOSIS — M10.9 GOUT, UNSPECIFIED CAUSE, UNSPECIFIED CHRONICITY, UNSPECIFIED SITE: ICD-10-CM

## 2023-10-28 DIAGNOSIS — F41.9 SEVERE ANXIETY: ICD-10-CM

## 2023-10-28 PROBLEM — D72.10 EOSINOPHILIA: Status: ACTIVE | Noted: 2023-10-28

## 2023-10-28 LAB
ALBUMIN SERPL BCP-MCNC: 3.5 G/DL (ref 3.5–5.2)
ALLENS TEST: ABNORMAL
ALP SERPL-CCNC: 88 U/L (ref 55–135)
ALT SERPL W/O P-5'-P-CCNC: 22 U/L (ref 10–44)
ANION GAP SERPL CALC-SCNC: 15 MMOL/L (ref 8–16)
AST SERPL-CCNC: 34 U/L (ref 10–40)
BASOPHILS # BLD AUTO: 0.12 K/UL (ref 0–0.2)
BASOPHILS NFR BLD: 0.9 % (ref 0–1.9)
BILIRUB SERPL-MCNC: 0.8 MG/DL (ref 0.1–1)
BNP SERPL-MCNC: 231 PG/ML (ref 0–99)
BUN SERPL-MCNC: 12 MG/DL (ref 8–23)
CALCIUM SERPL-MCNC: 9.3 MG/DL (ref 8.7–10.5)
CHLORIDE SERPL-SCNC: 98 MMOL/L (ref 95–110)
CO2 SERPL-SCNC: 27 MMOL/L (ref 23–29)
CREAT SERPL-MCNC: 0.8 MG/DL (ref 0.5–1.4)
DELSYS: ABNORMAL
DIFFERENTIAL METHOD: ABNORMAL
EOSINOPHIL # BLD AUTO: 0.8 K/UL (ref 0–0.5)
EOSINOPHIL NFR BLD: 6 % (ref 0–8)
ERYTHROCYTE [DISTWIDTH] IN BLOOD BY AUTOMATED COUNT: 20.6 % (ref 11.5–14.5)
EST. GFR  (NO RACE VARIABLE): >60 ML/MIN/1.73 M^2
FLOW: 2
GLUCOSE SERPL-MCNC: 106 MG/DL (ref 70–110)
HCO3 UR-SCNC: 33.3 MMOL/L (ref 24–28)
HCT VFR BLD AUTO: 39 % (ref 37–48.5)
HGB BLD-MCNC: 12.8 G/DL (ref 12–16)
IMM GRANULOCYTES # BLD AUTO: 0.11 K/UL (ref 0–0.04)
IMM GRANULOCYTES NFR BLD AUTO: 0.8 % (ref 0–0.5)
INFLUENZA A, MOLECULAR: NEGATIVE
INFLUENZA B, MOLECULAR: NEGATIVE
LYMPHOCYTES # BLD AUTO: 3.2 K/UL (ref 1–4.8)
LYMPHOCYTES NFR BLD: 22.7 % (ref 18–48)
MCH RBC QN AUTO: 34.5 PG (ref 27–31)
MCHC RBC AUTO-ENTMCNC: 32.8 G/DL (ref 32–36)
MCV RBC AUTO: 105 FL (ref 82–98)
MODE: ABNORMAL
MONOCYTES # BLD AUTO: 2 K/UL (ref 0.3–1)
MONOCYTES NFR BLD: 14.3 % (ref 4–15)
NEUTROPHILS # BLD AUTO: 7.7 K/UL (ref 1.8–7.7)
NEUTROPHILS NFR BLD: 55.3 % (ref 38–73)
NRBC BLD-RTO: 0 /100 WBC
PCO2 BLDA: 45.3 MMHG (ref 35–45)
PH SMN: 7.47 [PH] (ref 7.35–7.45)
PLATELET # BLD AUTO: 381 K/UL (ref 150–450)
PMV BLD AUTO: 10.3 FL (ref 9.2–12.9)
PO2 BLDA: 37 MMHG (ref 40–60)
POC BE: 10 MMOL/L
POC CARDIAC TROPONIN I: 0.01 NG/ML (ref 0–0.08)
POC CARDIAC TROPONIN I: 0.01 NG/ML (ref 0–0.08)
POC SATURATED O2: 74 % (ref 95–100)
POC TCO2: 35 MMOL/L (ref 24–29)
POTASSIUM SERPL-SCNC: 3.6 MMOL/L (ref 3.5–5.1)
PROT SERPL-MCNC: 7.4 G/DL (ref 6–8.4)
RBC # BLD AUTO: 3.71 M/UL (ref 4–5.4)
SAMPLE: ABNORMAL
SAMPLE: NORMAL
SAMPLE: NORMAL
SARS-COV-2 RDRP RESP QL NAA+PROBE: NEGATIVE
SITE: ABNORMAL
SODIUM SERPL-SCNC: 140 MMOL/L (ref 136–145)
SPECIMEN SOURCE: NORMAL
TROPONIN I SERPL DL<=0.01 NG/ML-MCNC: 0.02 NG/ML (ref 0–0.03)
TROPONIN I SERPL DL<=0.01 NG/ML-MCNC: 0.02 NG/ML (ref 0–0.03)
TSH SERPL DL<=0.005 MIU/L-ACNC: 0.72 UIU/ML (ref 0.4–4)
WBC # BLD AUTO: 13.91 K/UL (ref 3.9–12.7)

## 2023-10-28 PROCEDURE — 25000242 PHARM REV CODE 250 ALT 637 W/ HCPCS

## 2023-10-28 PROCEDURE — 27000221 HC OXYGEN, UP TO 24 HOURS

## 2023-10-28 PROCEDURE — 84484 ASSAY OF TROPONIN QUANT: CPT | Mod: HCNC

## 2023-10-28 PROCEDURE — 93010 ELECTROCARDIOGRAM REPORT: CPT | Mod: ,,, | Performed by: INTERNAL MEDICINE

## 2023-10-28 PROCEDURE — 96375 TX/PRO/DX INJ NEW DRUG ADDON: CPT | Mod: HCNC

## 2023-10-28 PROCEDURE — 93005 ELECTROCARDIOGRAM TRACING: CPT

## 2023-10-28 PROCEDURE — 25000003 PHARM REV CODE 250

## 2023-10-28 PROCEDURE — 94640 AIRWAY INHALATION TREATMENT: CPT

## 2023-10-28 PROCEDURE — 63600175 PHARM REV CODE 636 W HCPCS

## 2023-10-28 PROCEDURE — 85025 COMPLETE CBC W/AUTO DIFF WBC: CPT

## 2023-10-28 PROCEDURE — 94761 N-INVAS EAR/PLS OXIMETRY MLT: CPT

## 2023-10-28 PROCEDURE — G0378 HOSPITAL OBSERVATION PER HR: HCPCS

## 2023-10-28 PROCEDURE — 84484 ASSAY OF TROPONIN QUANT: CPT

## 2023-10-28 PROCEDURE — 93010 EKG 12-LEAD: ICD-10-PCS | Mod: ,,, | Performed by: INTERNAL MEDICINE

## 2023-10-28 PROCEDURE — 87502 INFLUENZA DNA AMP PROBE: CPT | Performed by: EMERGENCY MEDICINE

## 2023-10-28 PROCEDURE — 99285 EMERGENCY DEPT VISIT HI MDM: CPT | Mod: 25,HCNC

## 2023-10-28 PROCEDURE — 82803 BLOOD GASES ANY COMBINATION: CPT

## 2023-10-28 PROCEDURE — 99900035 HC TECH TIME PER 15 MIN (STAT)

## 2023-10-28 PROCEDURE — 96372 THER/PROPH/DIAG INJ SC/IM: CPT

## 2023-10-28 PROCEDURE — 83880 ASSAY OF NATRIURETIC PEPTIDE: CPT

## 2023-10-28 PROCEDURE — 25500020 PHARM REV CODE 255: Performed by: EMERGENCY MEDICINE

## 2023-10-28 PROCEDURE — 84443 ASSAY THYROID STIM HORMONE: CPT | Mod: HCNC

## 2023-10-28 PROCEDURE — 80053 COMPREHEN METABOLIC PANEL: CPT

## 2023-10-28 PROCEDURE — U0002 COVID-19 LAB TEST NON-CDC: HCPCS

## 2023-10-28 RX ORDER — GLUCAGON 1 MG
1 KIT INJECTION
Status: DISCONTINUED | OUTPATIENT
Start: 2023-10-28 | End: 2023-10-31 | Stop reason: HOSPADM

## 2023-10-28 RX ORDER — ALLOPURINOL 100 MG/1
200 TABLET ORAL DAILY
Status: DISCONTINUED | OUTPATIENT
Start: 2023-10-28 | End: 2023-10-31 | Stop reason: HOSPADM

## 2023-10-28 RX ORDER — QUETIAPINE FUMARATE 25 MG/1
50 TABLET, FILM COATED ORAL NIGHTLY
Status: DISCONTINUED | OUTPATIENT
Start: 2023-10-28 | End: 2023-10-31 | Stop reason: HOSPADM

## 2023-10-28 RX ORDER — IBUPROFEN 200 MG
16 TABLET ORAL
Status: DISCONTINUED | OUTPATIENT
Start: 2023-10-28 | End: 2023-10-28

## 2023-10-28 RX ORDER — MONTELUKAST SODIUM 10 MG/1
10 TABLET ORAL NIGHTLY
Status: DISCONTINUED | OUTPATIENT
Start: 2023-10-28 | End: 2023-10-28

## 2023-10-28 RX ORDER — BENZONATATE 200 MG/1
200 CAPSULE ORAL 3 TIMES DAILY PRN
COMMUNITY
End: 2023-11-14 | Stop reason: SDUPTHER

## 2023-10-28 RX ORDER — LORAZEPAM 2 MG/ML
0.5 INJECTION INTRAMUSCULAR
Status: COMPLETED | OUTPATIENT
Start: 2023-10-28 | End: 2023-10-28

## 2023-10-28 RX ORDER — NALOXONE HCL 0.4 MG/ML
0.02 VIAL (ML) INJECTION
Status: CANCELLED | OUTPATIENT
Start: 2023-10-28

## 2023-10-28 RX ORDER — GLUCAGON 1 MG
1 KIT INJECTION
Status: DISCONTINUED | OUTPATIENT
Start: 2023-10-28 | End: 2023-10-28

## 2023-10-28 RX ORDER — TALC
3 POWDER (GRAM) TOPICAL NIGHTLY PRN
Status: DISCONTINUED | OUTPATIENT
Start: 2023-10-28 | End: 2023-10-31 | Stop reason: HOSPADM

## 2023-10-28 RX ORDER — PANTOPRAZOLE SODIUM 40 MG/1
40 TABLET, DELAYED RELEASE ORAL DAILY
Status: DISCONTINUED | OUTPATIENT
Start: 2023-10-28 | End: 2023-10-31 | Stop reason: HOSPADM

## 2023-10-28 RX ORDER — FLUTICASONE PROPIONATE 50 MCG
1 SPRAY, SUSPENSION (ML) NASAL DAILY
Status: DISCONTINUED | OUTPATIENT
Start: 2023-10-29 | End: 2023-10-31 | Stop reason: HOSPADM

## 2023-10-28 RX ORDER — AMLODIPINE BESYLATE 5 MG/1
5 TABLET ORAL DAILY
Status: DISCONTINUED | OUTPATIENT
Start: 2023-10-28 | End: 2023-10-31 | Stop reason: HOSPADM

## 2023-10-28 RX ORDER — CARVEDILOL 6.25 MG/1
6.25 TABLET ORAL 2 TIMES DAILY WITH MEALS
Status: DISCONTINUED | OUTPATIENT
Start: 2023-10-28 | End: 2023-10-31 | Stop reason: HOSPADM

## 2023-10-28 RX ORDER — ENOXAPARIN SODIUM 100 MG/ML
40 INJECTION SUBCUTANEOUS EVERY 24 HOURS
Status: DISCONTINUED | OUTPATIENT
Start: 2023-10-28 | End: 2023-10-29

## 2023-10-28 RX ORDER — MAG HYDROX/ALUMINUM HYD/SIMETH 200-200-20
30 SUSPENSION, ORAL (FINAL DOSE FORM) ORAL
Status: DISCONTINUED | OUTPATIENT
Start: 2023-10-28 | End: 2023-10-30

## 2023-10-28 RX ORDER — HALOPERIDOL 5 MG/ML
2 INJECTION INTRAMUSCULAR ONCE AS NEEDED
Status: DISCONTINUED | OUTPATIENT
Start: 2023-10-28 | End: 2023-10-30

## 2023-10-28 RX ORDER — SODIUM CHLORIDE 0.9 % (FLUSH) 0.9 %
10 SYRINGE (ML) INJECTION EVERY 12 HOURS PRN
Status: DISCONTINUED | OUTPATIENT
Start: 2023-10-28 | End: 2023-10-28

## 2023-10-28 RX ORDER — ACETAMINOPHEN 325 MG/1
650 TABLET ORAL EVERY 4 HOURS PRN
Status: DISCONTINUED | OUTPATIENT
Start: 2023-10-28 | End: 2023-10-31 | Stop reason: HOSPADM

## 2023-10-28 RX ORDER — IBUPROFEN 200 MG
24 TABLET ORAL
Status: DISCONTINUED | OUTPATIENT
Start: 2023-10-28 | End: 2023-10-28

## 2023-10-28 RX ORDER — SODIUM CHLORIDE 0.9 % (FLUSH) 0.9 %
10 SYRINGE (ML) INJECTION EVERY 12 HOURS PRN
Status: DISCONTINUED | OUTPATIENT
Start: 2023-10-28 | End: 2023-10-31 | Stop reason: HOSPADM

## 2023-10-28 RX ORDER — ATORVASTATIN CALCIUM 40 MG/1
80 TABLET, FILM COATED ORAL DAILY
Status: DISCONTINUED | OUTPATIENT
Start: 2023-10-29 | End: 2023-10-31 | Stop reason: HOSPADM

## 2023-10-28 RX ORDER — NALOXONE HCL 0.4 MG/ML
0.02 VIAL (ML) INJECTION
Status: DISCONTINUED | OUTPATIENT
Start: 2023-10-28 | End: 2023-10-31 | Stop reason: HOSPADM

## 2023-10-28 RX ORDER — HYDROCHLOROTHIAZIDE 25 MG/1
25 TABLET ORAL DAILY
Status: DISCONTINUED | OUTPATIENT
Start: 2023-10-28 | End: 2023-10-30

## 2023-10-28 RX ORDER — IPRATROPIUM BROMIDE AND ALBUTEROL SULFATE 2.5; .5 MG/3ML; MG/3ML
3 SOLUTION RESPIRATORY (INHALATION)
Status: COMPLETED | OUTPATIENT
Start: 2023-10-28 | End: 2023-10-28

## 2023-10-28 RX ORDER — ASPIRIN 81 MG/1
81 TABLET ORAL DAILY
Status: DISCONTINUED | OUTPATIENT
Start: 2023-10-28 | End: 2023-10-31 | Stop reason: HOSPADM

## 2023-10-28 RX ORDER — QUETIAPINE FUMARATE 25 MG/1
50 TABLET, FILM COATED ORAL DAILY
Status: DISCONTINUED | OUTPATIENT
Start: 2023-10-28 | End: 2023-10-28

## 2023-10-28 RX ORDER — IBUPROFEN 200 MG
16 TABLET ORAL
Status: DISCONTINUED | OUTPATIENT
Start: 2023-10-28 | End: 2023-10-31 | Stop reason: HOSPADM

## 2023-10-28 RX ORDER — QUETIAPINE FUMARATE 25 MG/1
50 TABLET, FILM COATED ORAL NIGHTLY
Status: DISCONTINUED | OUTPATIENT
Start: 2023-10-29 | End: 2023-10-28

## 2023-10-28 RX ORDER — METHYLPREDNISOLONE SOD SUCC 125 MG
125 VIAL (EA) INJECTION
Status: COMPLETED | OUTPATIENT
Start: 2023-10-28 | End: 2023-10-28

## 2023-10-28 RX ORDER — IPRATROPIUM BROMIDE AND ALBUTEROL SULFATE 2.5; .5 MG/3ML; MG/3ML
SOLUTION RESPIRATORY (INHALATION)
Status: COMPLETED
Start: 2023-10-28 | End: 2023-10-28

## 2023-10-28 RX ORDER — MAG HYDROX/ALUMINUM HYD/SIMETH 200-200-20
30 SUSPENSION, ORAL (FINAL DOSE FORM) ORAL 4 TIMES DAILY PRN
Status: DISCONTINUED | OUTPATIENT
Start: 2023-10-28 | End: 2023-10-31 | Stop reason: HOSPADM

## 2023-10-28 RX ADMIN — ENOXAPARIN SODIUM 40 MG: 40 INJECTION SUBCUTANEOUS at 06:10

## 2023-10-28 RX ADMIN — QUETIAPINE FUMARATE 50 MG: 25 TABLET ORAL at 09:10

## 2023-10-28 RX ADMIN — PANTOPRAZOLE SODIUM 40 MG: 40 TABLET, DELAYED RELEASE ORAL at 03:10

## 2023-10-28 RX ADMIN — IPRATROPIUM BROMIDE AND ALBUTEROL SULFATE 3 ML: .5; 3 SOLUTION RESPIRATORY (INHALATION) at 08:10

## 2023-10-28 RX ADMIN — ALLOPURINOL 200 MG: 100 TABLET ORAL at 03:10

## 2023-10-28 RX ADMIN — HYDROCHLOROTHIAZIDE 25 MG: 25 TABLET ORAL at 03:10

## 2023-10-28 RX ADMIN — Medication 3 MG: at 09:10

## 2023-10-28 RX ADMIN — LORAZEPAM 0.5 MG: 2 INJECTION INTRAMUSCULAR; INTRAVENOUS at 11:10

## 2023-10-28 RX ADMIN — METHYLPREDNISOLONE SODIUM SUCCINATE 125 MG: 125 INJECTION, POWDER, FOR SOLUTION INTRAMUSCULAR; INTRAVENOUS at 11:10

## 2023-10-28 RX ADMIN — IPRATROPIUM BROMIDE AND ALBUTEROL SULFATE 3 ML: .5; 3 SOLUTION RESPIRATORY (INHALATION) at 12:10

## 2023-10-28 RX ADMIN — AMLODIPINE BESYLATE 5 MG: 5 TABLET ORAL at 03:10

## 2023-10-28 RX ADMIN — ASPIRIN 81 MG: 81 TABLET, COATED ORAL at 03:10

## 2023-10-28 RX ADMIN — IPRATROPIUM BROMIDE AND ALBUTEROL SULFATE 3 ML: .5; 3 SOLUTION RESPIRATORY (INHALATION) at 09:10

## 2023-10-28 RX ADMIN — IOHEXOL 75 ML: 350 INJECTION, SOLUTION INTRAVENOUS at 11:10

## 2023-10-28 RX ADMIN — CARVEDILOL 6.25 MG: 6.25 TABLET, FILM COATED ORAL at 06:10

## 2023-10-28 RX ADMIN — ALUMINUM HYDROXIDE, MAGNESIUM HYDROXIDE, AND SIMETHICONE 30 ML: 200; 200; 20 SUSPENSION ORAL at 09:10

## 2023-10-28 NOTE — MEDICAL/APP STUDENT
"10/28/2023    Jessica Floyd  1950  10/28/2023     SUBJECTIVE    CC:   Chief Complaint   Patient presents with    Shortness of Breath       HPI:   Pt is 72 yo female presenting to ED with  and daughter for shortness of breath x 3 days. SoB developed after syncopal-episode that occurred during pre-operative anesthesia exam prior to R shoulder replacements surgery scheduled for 10/25. She remembers standing up and feeling a "nausea-like" sensation rush over her body, from her toes up to her head so she laid down shortly before passing out. This was witness, supposedly down 2-3 minutes, awoke oriented and without confusion, remembered passing out. Denies weakness, numbness, tingling, urinary or bowel incontinence, or dizziness before or after syncopal event. Has a history of similar syncopal events, reports they were diagnosed as "stress-induced" syncope by her cardiologist in [facility] where she used to live.    Since then, shortness of breath has persisted and is worsening. Associated with wheezing. She feels it is related to anxiety and worsens when she feels anxious. Also worse with lying flat on her back and exertion. Prefers to sleep sitting up, but this is long standing. SoB is preventing her from sleeping. Denies history of lung disease, such as asthma or COPD, or use of inhalers. Quit smoking 25 years ago.    Endorses frequent bowel movement, x3 per day. Also significant amount of anxiety, feels she'd benefit from seeing psychiatrist. Reports postnasal drip, takes nasal sprays. Denies history of blood clot.    In ED, /95, SaO2 90% on 3L, , RR 22, 97.9F. Labs significant for , WBC 13. Troponin wnl on first draw. CXR revealed right perihilar opacity, radiologist recommended following with chest CT>.CTA showed no convincing pulmonary thromboembolism, but notable for emphysematous changes with scattered atelectasis or scarring, however no focal consolidation. ED intiated sky, " methylprednisone 125 IV single dose, and lorazepam 0.5mg dose prior to CT.    Denies chest pain, coug, sputum production, fever, leg swelling, nausea, vomiting, constipation, leg pain, weakness, dizziness, fever, chills.      Review of Systems   Constitutional:  Negative for chills, diaphoresis and fever.   HENT:          Postnasal drip   Respiratory:  Positive for shortness of breath and wheezing. Negative for cough and sputum production.    Cardiovascular:  Positive for orthopnea. Negative for chest pain and leg swelling.   Gastrointestinal:  Negative for constipation, nausea and vomiting.   Neurological:  Positive for weakness (right shoulder). Negative for tingling.   Psychiatric/Behavioral:  The patient is nervous/anxious.        Current Medications  Scheduled Meds:   allopurinoL  200 mg Oral Daily    aluminum-magnesium hydroxide-simethicone  30 mL Oral QID (AC & HS)    amLODIPine  5 mg Oral Daily    aspirin  81 mg Oral Daily    [START ON 10/29/2023] atorvastatin  80 mg Oral Daily    carvediloL  6.25 mg Oral BID WM    [START ON 10/29/2023] fluticasone propionate  1 spray Each Nostril Daily    hydroCHLOROthiazide  25 mg Oral Daily    montelukast  10 mg Oral QHS    pantoprazole  40 mg Oral Daily       Relevant History  Past Medical History:   Diagnosis Date    Allergic rhinitis     Amblyopia     Carotid stenosis     Coronary atherosclerosis     Outside [facility] 6/2014: 20% mLAD, 50% mCx, 20%, mRCA    Dyslipidemia     ESBL (extended spectrum beta-lactamase) producing bacteria infection     UTI    Hypertension     Nausea and vomiting 05/26/2017    SAH (subarachnoid hemorrhage) 08/24/2016 1999, s/p clipping    Subarachnoid hemorrhage due to ruptured aneurysm 1999    - Reports episode of epistaxis Feb 26 leading to significant blood loss and requiring cauterization, reports needing iron transfusions after d/t anemia.         OBJECTIVE  Vitals  Wt Readings from Last 3 Encounters:   10/28/23 54.4 kg (120 lb)   10/25/23  54.4 kg (119 lb 14.9 oz)   10/24/23 54.4 kg (120 lb)     Temp Readings from Last 3 Encounters:   10/28/23 97.9 °F (36.6 °C) (Oral)   10/25/23 98.5 °F (36.9 °C) (Oral)   10/24/23 97.9 °F (36.6 °C) (Temporal)     BP Readings from Last 3 Encounters:   10/28/23 (!) 140/73   10/25/23 (!) 151/74   10/24/23 128/71     Pulse Readings from Last 3 Encounters:   10/28/23 95   10/25/23 97   10/24/23 78         Physical Exam        Labs    Lab Results   Component Value Date     10/28/2023    K 3.6 10/28/2023    CL 98 10/28/2023    CO2 27 10/28/2023    BUN 12 10/28/2023    CREATININE 0.8 10/28/2023    CALCIUM 9.3 10/28/2023    ANIONGAP 15 10/28/2023    EGFRNORACEVR >60.0 10/28/2023       Lab Results   Component Value Date    WBC 13.91 (H) 10/28/2023    HGB 12.8 10/28/2023    HCT 39.0 10/28/2023     (H) 10/28/2023     10/28/2023     BNP  Recent Labs   Lab 10/28/23  0920   *     Recent Labs   Lab 10/28/23  1348   TROPONINI 0.021         Imaging  Results for orders placed or performed during the hospital encounter of 10/28/23 (from the past 2160 hour(s))   CTA Chest Non-Coronary (PE Studies)    Narrative    EXAMINATION:  CTA CHEST NON CORONARY (PE STUDIES)    CLINICAL HISTORY:  Pulmonary embolism (PE) suspected, unknown D-dimer;    TECHNIQUE:  Low dose axial images, sagittal and coronal reformations were obtained from the thoracic inlet to the lung bases following the IV administration of 75 mL of Omnipaque 350.  Contrast timing was optimized to evaluate the pulmonary arteries.  MIP images were performed.    COMPARISON:  Radiograph 10/28/2023    FINDINGS:  The structures at the base of the neck are grossly unremarkable noting partially visualized right common carotid artery stent.  No significant mediastinal lymphadenopathy.  The heart is not enlarged.  The thoracic aorta tapers normally noting atherosclerotic calcification along its course.  Allowing for bolus timing, the visualized portions of the  kidneys, spleen, adrenal glands and pancreas are unremarkable.  There is a low attenuating focus within the medial aspect of the left hepatic lobe measuring 2 cm, attenuation of which suggests cyst.    Motion artifact limits evaluation of the airways and pulmonary parenchyma.  Allowing for this, the airways are grossly patent.  There is biapical atelectasis or scarring.  There is bilateral pulmonary emphysematous change.  There is bilateral basilar dependent atelectasis.  No large focal consolidation.  No pneumothorax.    Bolus timing is adequate for evaluation of pulmonary thromboembolism however examination is limited secondary to artifact from motion.  Allowing for this, no convincing pulmonary arterial filling defects to the level of the segmental branches bilaterally to suggest pulmonary thromboembolism.  Please note, there is particularly limited evaluation of the segmental branches to the bilateral lower lobes.  Correlation of these findings with D-dimer and/or lower extremity ultrasound as warranted.    There are degenerative changes of the spine.  There is osteopenia.  There are remote appearing right rib injuries.  No significant axillary lymphadenopathy.  Schmorl's nodes involve the superior endplates of L1, T12 and T11.      Impression    1. Allowing for motion artifact, no convincing pulmonary thromboembolism.  Correlation of these findings with D-dimer and/or lower extremity ultrasound as warranted.  2. Pulmonary emphysematous change noting scattered regions of atelectasis or scarring.  No convincing large focal consolidation.  3. Degenerative changes of the spine.  4. Please see above for additional findings.      Electronically signed by: Armani Yuan MD  Date:    10/28/2023  Time:    12:33     CXR 10/28  FINDINGS:  The lungs are well expanded.  There is a right perihilar opacity cannot exclude underlying mass.  Recommend further evaluation with chest CT..  There is no pneumothorax or pleural  fluid.  The cardiac silhouette is prominent.  The osseous structures demonstrate degenerative change.      ASSESSMENT & PLAN    Shortness of Breath, likely exacerbated by anxiety  CXR - emphysematous changes, no focal mass or consolidation  CTA - no evidence of filling defects to suggest PE  VBG - pH 7.47, PCO2 45.3, PO2 27    Satting 88-90% on 2-3 L nc in room but poor wave form.  Hx of anxiety but no formal diagnosis, not on psych meds, sxs improved with lorezepam  SoB likely exacerbated by acute anxiety     Plan  Seroquel 50 mg qhs  Haloperidol  LABA  Supplemental O2 prn  Monitor overnight       2. Hx of Hypertension  Hx SAH d/t ruptured aneursym s/p clip 1999  /95    Plan  Home amlodipine 5mg qd, carvedilol 6.25mg BID, HCTZ 25mg qd    3. Seasonal Allergies   Continue home montelukast 10mg qhs and fluticasone daily.    4. CAD  5. Carotid Stenosis  Troponin trend WNL  Hx of CAD, carotid stenosis with stenting  No focal weakness or neural deficits present.     Continue home atorvastain 80mg, ASA 81mg    6. Gout  No recent flares    Continue home allopurinol 200 mg qd      DVT ppx with lovenox 40 mg              ___________________________________________________________  Laurie Angeles OMS-IV  Visiting Student Elective

## 2023-10-28 NOTE — HPI
"Ms. Floyd is a 74 yo F with a PMHx of SAH (1999), HTN, HLD, Anxiety, epsitaxis, vasovagal syncope, subclinical hyperthyroidism, carotid artery stent and allergic rhinitis who presents for SOB that started since Wednesday. She describes it is worse upon lying flat and on minimal exertion. Her SOB improves by sitting up. Pt also denotes that she developed a syncopal attack last Wednesday just prior to her shoulder replacement surgery. This is not her first syncopal attack. She says as usual it was preceded by a 'whoosh of nausea' and 'jaw clonus' afterwhich she passed out for a period of 4 minutes. She did not have frothing of her mouth, uprolling of eyes, nor did she lose consciousness. She says her anxiety is really affecting her life.    She says she was diagnosed with "stress induced reflex syncope" in [facility] in 2015 and has about 1 episode per year.     No chest pain, fevers, chills, night sweats, abdominal pain or N/V/D. She does have a remote smoking history where she smoked 1 PPD for 25 years but quit 20 years ago.     No fever, cough with sputum, no seizures, no central cardiac chest pain    At the ER her vitals:    Bp 140/73  spo2 91%  RR 22 HR 95%    Examination just yielded an anxious looking lady   With limited right shoulder mobility  Her lungs were clear  LL: very mild edema      "

## 2023-10-28 NOTE — ED PROVIDER NOTES
"Encounter Date: 10/28/2023       History     Chief Complaint   Patient presents with    Shortness of Breath     Ms. Floyd is a 72 yo F with a PMHx of SAH (1999), HTN, HLD, Anxiety, vasovagal syncope, carotid artery stent and allergic rhinitis who presents for shortness of breath since Wednesday. She was supposed to have a shoulder replacement on Wednesday but had an anxiety attack before surgery and passed out. Since then she has had SOB that is worse with exertion or lying down. She has no shortness of breath while sitting up. She says she was diagnosed with "stress induced reflex syncope" in [facility] in 2015 and has about 1 episode per year. She has also been under increased stress this year especially this week. She had this surgery scheduled and also her granddaughter was in [place] during this shooting this week. She has had a H/A as well. Her  has been hearing hear wheeze at night for the past few nights. No chest pain, fevers, chills, night sweats, abdominal pain or N/V/D. She does have a remote smoking history where she smoked 1 PPD for 25 years but quit 20 years ago.     The history is provided by the patient, the spouse and medical records.     Review of patient's allergies indicates:  No Known Allergies  Past Medical History:   Diagnosis Date    Allergic rhinitis     Amblyopia     Carotid stenosis     Coronary atherosclerosis     Outside [facility] 6/2014: 20% mLAD, 50% mCx, 20%, mRCA    Dyslipidemia     ESBL (extended spectrum beta-lactamase) producing bacteria infection     UTI    Hypertension     Nausea and vomiting 05/26/2017    SAH (subarachnoid hemorrhage) 08/24/2016 1999, s/p clipping    Subarachnoid hemorrhage due to ruptured aneurysm 1999     Past Surgical History:   Procedure Laterality Date    ADENOIDECTOMY      ANTERIOR CRUCIATE LIGAMENT REPAIR  2012    APPENDECTOMY      CATARACT EXTRACTION W/  INTRAOCULAR LENS IMPLANT Right 11/07/2022    Procedure: EXTRACTION, CATARACT, WITH IOL INSERTION;  " Surgeon: Higinio Cristina MD;  Location: [facility];  Service: Ophthalmology;  Laterality: Right;    CATARACT EXTRACTION W/  INTRAOCULAR LENS IMPLANT Left 2022    Procedure: EXTRACTION, CATARACT, WITH IOL INSERTION;  Surgeon: Higinio Cristina MD;  Location: [facility];  Service: Ophthalmology;  Laterality: Left;    CEREBRAL ANEURYSM REPAIR      [facility]    DENTAL SURGERY      EYE SURGERY Bilateral     cataract removal and lens implants    HIP ARTHROPLASTY Left 2023    Procedure: TOTAL HIP ARTHROPLASTY;  Surgeon: Juan Wna MD;  Location: [address];  Service: Orthopedics;  Laterality: Left;    TONSILLECTOMY       Family History   Problem Relation Age of Onset    Hypertension Mother     Aneurysm Mother     Hypertension Father     Alcohol abuse Father     Kidney disease Brother     Heart disease Brother     Gout Brother     No Known Problems Daughter     No Known Problems Daughter     No Known Problems Daughter      Social History     Tobacco Use    Smoking status: Former     Current packs/day: 0.00     Average packs/day: 0.5 packs/day for 20.0 years (10.0 ttl pk-yrs)     Types: Cigarettes     Start date: 1979     Quit date: 1999     Years since quittin.8     Passive exposure: Past    Smokeless tobacco: Never   Substance Use Topics    Alcohol use: Yes     Alcohol/week: 7.0 standard drinks of alcohol     Types: 7 Glasses of wine per week     Comment: One glass of wine prior to dinner    Drug use: No     Review of Systems    Physical Exam     Initial Vitals [10/28/23 0807]   BP Pulse Resp Temp SpO2   139/78 85 20 97.9 °F (36.6 °C) (!) 94 %      MAP       --         Physical Exam    Constitutional: She appears well-developed and well-nourished.   HENT:   Head: Normocephalic and atraumatic.   Eyes: Conjunctivae are normal.   Neck: Neck supple.   Normal range of motion.  Cardiovascular:  Normal rate, normal heart sounds and intact distal pulses. An irregular rhythm present.            Pulmonary/Chest: No respiratory distress. She has decreased breath sounds. She has wheezes.   Abdominal: Abdomen is soft. Bowel sounds are normal. She exhibits no distension. There is no abdominal tenderness.   Musculoskeletal:         General: No tenderness or edema.      Cervical back: Normal range of motion and neck supple.     Neurological: She is alert and oriented to person, place, and time.   Skin: Skin is warm and dry. Capillary refill takes less than 2 seconds.   Psychiatric: She has a normal mood and affect.         ED Course   Procedures  Labs Reviewed   CBC W/ AUTO DIFFERENTIAL - Abnormal; Notable for the following components:       Result Value    WBC 13.91 (*)     RBC 3.71 (*)      (*)     MCH 34.5 (*)     RDW 20.6 (*)     Immature Granulocytes 0.8 (*)     Immature Grans (Abs) 0.11 (*)     Mono # 2.0 (*)     Eos # 0.8 (*)     All other components within normal limits   B-TYPE NATRIURETIC PEPTIDE - Abnormal; Notable for the following components:     (*)     All other components within normal limits   INFLUENZA A & B BY MOLECULAR   COMPREHENSIVE METABOLIC PANEL   SARS-COV-2 RNA AMPLIFICATION, QUAL   TROPONIN I   TROPONIN ISTAT   TROPONIN I   POCT TROPONIN   POCT TROPONIN        ECG Results              EKG 12-lead (Final result)  Result time 10/28/23 11:05:05      Final result by Interface, Lab In [facility] (10/28/23 11:05:05)                   Narrative:    Test Reason : R06.02,    Vent. Rate : 079 BPM     Atrial Rate : 079 BPM     P-R Int : 128 ms          QRS Dur : 088 ms      QT Int : 382 ms       P-R-T Axes : 081 -21 001 degrees     QTc Int : 438 ms    Sinus rhythm with occasional Premature ventricular complexes and Premature  atrial complexes and artifact  Nonspecific T wave abnormality  Abnormal ECG  When compared with ECG of 26-MAY-2023 21:59,  Premature ventricular complexes are now Present  Premature atrial complexes are now Present  Nonspecific T wave abnormality has replaced  inverted T waves in Lateral  leads  QT has shortened  Confirmed by Emeka CARMEN MD ([id number]) on 10/28/2023 11:04:55 AM    Referred By: [email or web address]   SELF           Confirmed By:Emeka CARMEN MD                                  Imaging Results              CTA Chest Non-Coronary (PE Studies) (Final result)  Result time 10/28/23 12:33:33      Final result by Armani Yuan MD (10/28/23 12:33:33)                   Impression:      1. Allowing for motion artifact, no convincing pulmonary thromboembolism.  Correlation of these findings with D-dimer and/or lower extremity ultrasound as warranted.  2. Pulmonary emphysematous change noting scattered regions of atelectasis or scarring.  No convincing large focal consolidation.  3. Degenerative changes of the spine.  4. Please see above for additional findings.      Electronically signed by: Armani Yuan MD  Date:    10/28/2023  Time:    12:33               Narrative:    EXAMINATION:  CTA CHEST NON CORONARY (PE STUDIES)    CLINICAL HISTORY:  Pulmonary embolism (PE) suspected, unknown D-dimer;    TECHNIQUE:  Low dose axial images, sagittal and coronal reformations were obtained from the thoracic inlet to the lung bases following the IV administration of 75 mL of Omnipaque 350.  Contrast timing was optimized to evaluate the pulmonary arteries.  MIP images were performed.    COMPARISON:  Radiograph 10/28/2023    FINDINGS:  The structures at the base of the neck are grossly unremarkable noting partially visualized right common carotid artery stent.  No significant mediastinal lymphadenopathy.  The heart is not enlarged.  The thoracic aorta tapers normally noting atherosclerotic calcification along its course.  Allowing for bolus timing, the visualized portions of the kidneys, spleen, adrenal glands and pancreas are unremarkable.  There is a low attenuating focus within the medial aspect of the left hepatic lobe measuring 2 cm, attenuation of which suggests cyst.    Motion artifact  limits evaluation of the airways and pulmonary parenchyma.  Allowing for this, the airways are grossly patent.  There is biapical atelectasis or scarring.  There is bilateral pulmonary emphysematous change.  There is bilateral basilar dependent atelectasis.  No large focal consolidation.  No pneumothorax.    Bolus timing is adequate for evaluation of pulmonary thromboembolism however examination is limited secondary to artifact from motion.  Allowing for this, no convincing pulmonary arterial filling defects to the level of the segmental branches bilaterally to suggest pulmonary thromboembolism.  Please note, there is particularly limited evaluation of the segmental branches to the bilateral lower lobes.  Correlation of these findings with D-dimer and/or lower extremity ultrasound as warranted.    There are degenerative changes of the spine.  There is osteopenia.  There are remote appearing right rib injuries.  No significant axillary lymphadenopathy.  Schmorl's nodes involve the superior endplates of L1, T12 and T11.                                       X-Ray Chest 1 View (Final result)  Result time 10/28/23 09:58:36      Final result by Giovany Fernández MD (10/28/23 09:58:36)                   Impression:      As above.      Electronically signed by: Giovany Fernández MD  Date:    10/28/2023  Time:    09:58               Narrative:    EXAMINATION:  XR CHEST 1 VIEW    CLINICAL HISTORY:  shortness of breath;    TECHNIQUE:  Single frontal view of the chest was performed.    FINDINGS:  The lungs are well expanded.  There is a right perihilar opacity cannot exclude underlying mass.  Recommend further evaluation with chest CT..  There is no pneumothorax or pleural fluid.  The cardiac silhouette is prominent.  The osseous structures demonstrate degenerative change.                                       Medications   albuterol-ipratropium 2.5 mg-0.5 mg/3 mL nebulizer solution 3 mL (3 mLs Nebulization Given 10/28/23 0903)    LORazepam injection 0.5 mg (0.5 mg Intravenous Given 10/28/23 1112)   methylPREDNISolone sodium succinate injection 125 mg (125 mg Intravenous Given 10/28/23 1114)   albuterol-ipratropium 2.5 mg-0.5 mg/3 mL nebulizer solution 3 mL (3 mLs Nebulization Given 10/28/23 1207)   iohexoL (OMNIPAQUE 350) injection 75 mL (75 mLs Intravenous Given 10/28/23 1148)     Medical Decision Making  Ms. Floyd is a 72 yo F with a PMHx of SAH (1999), HTN, HLD, Anxiety, vasovagal syncope, carotid artery stent and allergic rhinitis who presents for shortness of breath since Wednesday after having an episode of anxiety provoked syncope. Differential at this time includes ACS, PE, viral syndrome or COPD exacerbation. Patient has decreased breath sounds and wheezing on exam. Duo nebs ordered as well as basic labs, CXR, troponin and BNP. CT PE negative. Patient has elevated BNP and WBC. She is likely having a COPD exacerbation. She will be admitted to observation for further management.     Amount and/or Complexity of Data Reviewed  Labs: ordered.  Radiology: ordered.    Risk  Prescription drug management.              Attending Attestation:   Physician Attestation Statement for Resident:  As the supervising MD   Physician Attestation Statement: I have personally seen and examined this patient.   I agree with the above history.  -:   As the supervising MD I agree with the above PE.     As the supervising MD I agree with the above treatment, course, plan, and disposition.            Attending Critical Care:   Critical Care Times:   Direct Patient Care (initial evaluation, reassessments, and time considering the case)................................................................24 minutes.   Additional History from reviewing old medical records or taking additional history from the family, EMS, PCP, etc.......................3 minutes.   Ordering, Reviewing, and Interpreting Diagnostic  Studies...............................................................................................................3 minutes.   Documentation..................................................................................................................................................................................3 minutes.   Consultation with other Physicians. .................................................................................................................................................3 minutes.   ==============================================================  Total Critical Care Time - exclusive of procedural time: 36 minutes.  ==============================================================                          Clinical Impression:   Final diagnoses:  [R06.02] SOB (shortness of breath)  [R06.02] Shortness of breath  [R07.9] Chest pain        ED Disposition Condition    Observation                 Musa Serra MD  Resident  10/28/23 140       Dakota Grimes MD  10/30/23 1225

## 2023-10-28 NOTE — ED TRIAGE NOTES
Jessica Floyd, a 73 y.o. female presents to the ED w/ complaint of SOB    Triage note:  Chief Complaint   Patient presents with    Shortness of Breath     Review of patient's allergies indicates:  No Known Allergies  Past Medical History:   Diagnosis Date    Allergic rhinitis     Amblyopia     Carotid stenosis     Coronary atherosclerosis     Outside [facility] 6/2014: 20% mLAD, 50% mCx, 20%, mRCA    Dyslipidemia     ESBL (extended spectrum beta-lactamase) producing bacteria infection     UTI    Hypertension     Nausea and vomiting 05/26/2017    SAH (subarachnoid hemorrhage) 08/24/2016 1999, s/p clipping    Subarachnoid hemorrhage due to ruptured aneurysm 1999

## 2023-10-28 NOTE — PHARMACY MED REC
"Admission Medication History     The home medication history was taken by Nichelle Enamorado.    You may go to "Admission" then "Reconcile Home Medications" tabs to review and/or act upon these items.     The home medication list has been updated by the Pharmacy department.   Please read ALL comments highlighted in yellow.   Please address this information as you see fit.    Feel free to contact us if you have any questions or require assistance.      The medications listed below were removed from the home medication list. Please reorder if appropriate:  Patient reports no longer taking the following medication(s):  CLOTRIMAZOLE 1 % SOLUTION  OXYCODONE IR 5 MG TABLET      Medications listed below were obtained from: Patient/family and Analytic software- [employer]  Current Outpatient Medications on File Prior to Encounter   Medication Sig    acetaminophen (TYLENOL) 650 MG TbSR   Take 1 tablet (650 mg total) by mouth every 6 to 8 hours as needed (pain (mild-moderate)).    allopurinoL (ZYLOPRIM) 100 MG tablet   Take 2 tablets (200 mg total) by mouth once daily.    amLODIPine (NORVASC) 5 MG tablet   Take 1 tablet by mouth once daily.    aspirin (ECOTRIN) 81 MG EC tablet   Take 81 mg by mouth once daily.    atorvastatin (LIPITOR) 80 MG tablet   Take 1 tablet by mouth once daily.    benzonatate (TESSALON) 200 MG capsule   Take 200 mg by mouth 3 (three) times daily as needed for cough.    carvediloL (COREG) 6.25 MG tablet   Take 1 tablet (6.25 mg total) by mouth 2 (two) times daily with meals.    celecoxib (CELEBREX) 200 MG capsule   Take 200 mg by mouth as needed.    colchicine, gout, (COLCRYS) 0.6 mg tablet   Take 0.6 mg by mouth once daily as needed for gout.    diphenhydrAMINE-acetaminophen (TYLENOL PM)  mg Tab   Take 2 tablets by mouth nightly as needed for pain and insomnia.    fluticasone propionate (FLONASE) 50 mcg/actuation nasal spray   Instill 1 spray into each nostril once daily.    hydroCHLOROthiazide " (HYDRODIURIL) 25 MG tablet   Take 1 tablet (25 mg total) by mouth once daily.    melatonin (MELATIN) 3 mg tablet   Take 2 tablets (6 mg total) by mouth nightly as needed for Insomnia.    montelukast (SINGULAIR) 10 mg tablet   Take 1 tablet (10 mg total) by mouth every evening.    pantoprazole (PROTONIX) 40 MG tablet   Take 1 tablet (40 mg total) by mouth once daily.    prenatal no122/iron/folic acid (PRENATAL MULTI ORAL)   Take 1 tablet by mouth once daily.         Nichelle Enamorado  [id number]                  .

## 2023-10-28 NOTE — H&P
"[facility] - Emergency Dept  [facility] Medicine  History & Physical    Patient Name: Jessica Floyd  MRN: [id number]  Patient Class: OP- Observation  Admission Date: 10/28/2023  Attending Physician: Krysta Mooney MD   Primary Care Provider: Effie Olmedo MD         Patient information was obtained from patient, relative(s) and ER records.     Subjective:     Principal Problem: Shortness of Breath    Chief Complaint:   Chief Complaint   Patient presents with    Shortness of Breath        HPI: Ms. Floyd is a 74 yo F with a PMHx of SAH (1999), HTN, HLD, Anxiety, epsitaxis, vasovagal syncope, subclinical hyperthyroidism, carotid artery stent and allergic rhinitis who presents for SOB that started since Wednesday. She describes it is worse upon lying flat and on minimal exertion. Her SOB improves by sitting up. Pt also denotes that she developed a syncopal attack last Wednesday just prior to her shoulder replacement surgery. This is not her first syncopal attack. She says as usual it was preceded by a 'whoosh of nausea' and 'jaw clonus' afterwhich she passed out for a period of 4 minutes. She did not have frothing of her mouth, uprolling of eyes, nor did she lose consciousness. She says her anxiety is really affecting her life.    She says she was diagnosed with "stress induced reflex syncope" in [facility] in 2015 and has about 1 episode per year.     No chest pain, fevers, chills, night sweats, abdominal pain or N/V/D. She does have a remote smoking history where she smoked 1 PPD for 25 years but quit 20 years ago.     No fever, cough with sputum, no seizures, no central cardiac chest pain    At the ER her vitals:    Bp 140/73  spo2 91%  RR 22 HR 95%    Examination just yielded an anxious looking lady   With limited right shoulder mobility  Her lungs were clear  LL: very mild edema          Past Medical History:   Diagnosis Date    Allergic rhinitis     Amblyopia     Carotid stenosis     Coronary atherosclerosis     " Outside [facility] 6/2014: 20% mLAD, 50% mCx, 20%, mRCA    Dyslipidemia     ESBL (extended spectrum beta-lactamase) producing bacteria infection     UTI    Hypertension     Nausea and vomiting 05/26/2017    SAH (subarachnoid hemorrhage) 08/24/2016 1999, s/p clipping    Subarachnoid hemorrhage due to ruptured aneurysm 1999       Past Surgical History:   Procedure Laterality Date    ADENOIDECTOMY      ANTERIOR CRUCIATE LIGAMENT REPAIR  2012    APPENDECTOMY      CATARACT EXTRACTION W/  INTRAOCULAR LENS IMPLANT Right 11/07/2022    Procedure: EXTRACTION, CATARACT, WITH IOL INSERTION;  Surgeon: Higinio Cristina MD;  Location: [facility];  Service: Ophthalmology;  Laterality: Right;    CATARACT EXTRACTION W/  INTRAOCULAR LENS IMPLANT Left 11/21/2022    Procedure: EXTRACTION, CATARACT, WITH IOL INSERTION;  Surgeon: Higinio Cristina MD;  Location: [facility];  Service: Ophthalmology;  Laterality: Left;    CEREBRAL ANEURYSM REPAIR  1999    clip    DENTAL SURGERY      EYE SURGERY Bilateral     cataract removal and lens implants    HIP ARTHROPLASTY Left 05/28/2023    Procedure: TOTAL HIP ARTHROPLASTY;  Surgeon: Juan Wan MD;  Location: [address];  Service: Orthopedics;  Laterality: Left;    TONSILLECTOMY         Review of patient's allergies indicates:  No Known Allergies    Current Facility-Administered Medications on File Prior to Encounter   Medication    mupirocin 2 % ointment    tranexamic acid (CYKLOKAPRON) 1,000 mg in sodium chloride 0.9 % 100 mL IVPB (MB+)    tranexamic acid (CYKLOKAPRON) 1,000 mg in sodium chloride 0.9 % 100 mL IVPB (MB+)     Current Outpatient Medications on File Prior to Encounter   Medication Sig    acetaminophen (TYLENOL) 650 MG TbSR Take 1 tablet (650 mg total) by mouth every 6 to 8 hours as needed (pain (mild-moderate)).    allopurinoL (ZYLOPRIM) 100 MG tablet Take 2 tablets (200 mg total) by mouth once daily.    amLODIPine (NORVASC) 5 MG tablet TAKE 1 TABLET BY MOUTH EVERY DAY    aspirin  (ECOTRIN) 81 MG EC tablet Take 81 mg by mouth once daily.    atorvastatin (LIPITOR) 80 MG tablet TAKE 1 TABLET BY MOUTH EVERY DAY    benzonatate (TESSALON) 200 MG capsule Take 200 mg by mouth 3 (three) times daily as needed for Cough.    carvediloL (COREG) 6.25 MG tablet Take 1 tablet (6.25 mg total) by mouth 2 (two) times daily with meals.    celecoxib (CELEBREX) 200 MG capsule Take 1 capsule (200 mg total) by mouth once daily. (Patient taking differently: Take 200 mg by mouth as needed.)    colchicine, gout, (COLCRYS) 0.6 mg tablet Take 1 tablet (0.6 mg total) by mouth once daily. (Patient taking differently: Take 0.6 mg by mouth once daily. As needed for gout)    diphenhydrAMINE-acetaminophen (TYLENOL PM)  mg Tab Take 2 tablets by mouth nightly as needed. Pain    fluticasone propionate (FLONASE) 50 mcg/actuation nasal spray SPRAY ONCE INTO EACH NOSTRIL ONCE DAILY    hydroCHLOROthiazide (HYDRODIURIL) 25 MG tablet Take 1 tablet (25 mg total) by mouth once daily.    melatonin (MELATIN) 3 mg tablet Take 2 tablets (6 mg total) by mouth nightly as needed for Insomnia.    montelukast (SINGULAIR) 10 mg tablet Take 1 tablet (10 mg total) by mouth every evening.    pantoprazole (PROTONIX) 40 MG tablet Take 1 tablet (40 mg total) by mouth once daily.    prenatal no122/iron/folic acid (PRENATAL MULTI ORAL) Take 1 tablet by mouth once daily.    [DISCONTINUED] clotrimazole (LOTRIMIN) 1 % Soln Apply topically once daily. To affected toenails. (Patient not taking: Reported on 10/16/2023)    [DISCONTINUED] oxyCODONE (ROXICODONE) 5 MG immediate release tablet Take 1 tablet (5 mg total) by mouth every 4 to 6 hours as needed for Pain. (Moderate-severe)     Family History       Problem Relation (Age of Onset)    Alcohol abuse Father    Aneurysm Mother    Gout Brother    Heart disease Brother    Hypertension Mother, Father    Kidney disease Brother    No Known Problems Daughter, Daughter, Daughter          Tobacco Use    Smoking  status: Former     Current packs/day: 0.00     Average packs/day: 0.5 packs/day for 20.0 years (10.0 ttl pk-yrs)     Types: Cigarettes     Start date: 1979     Quit date: 1999     Years since quittin.8     Passive exposure: Past    Smokeless tobacco: Never   Substance and Sexual Activity    Alcohol use: Yes     Alcohol/week: 7.0 standard drinks of alcohol     Types: 7 Glasses of wine per week     Comment: One glass of wine prior to dinner    Drug use: No    Sexual activity: Yes     Partners: Male     Review of Systems   Constitutional:  Positive for fatigue. Negative for activity change, appetite change and diaphoresis.   Respiratory:  Positive for chest tightness and shortness of breath. Negative for wheezing.    Cardiovascular:  Positive for leg swelling (very minimal). Negative for chest pain.   Gastrointestinal:  Negative for abdominal distention and abdominal pain.   Genitourinary:  Negative for difficulty urinating, dysuria and hematuria.   Skin: Negative.    Neurological: Negative.    Psychiatric/Behavioral:  The patient is nervous/anxious.      Objective:     Vital Signs (Most Recent):  Temp: 97.9 °F (36.6 °C) (10/28/23 0807)  Pulse: 95 (10/28/23 1302)  Resp: (!) 22 (10/28/23 1207)  BP: (!) 140/73 (10/28/23 1302)  SpO2: (!) 91 % (10/28/23 1302) Vital Signs (24h Range):  Temp:  [97.9 °F (36.6 °C)] 97.9 °F (36.6 °C)  Pulse:  [80-95] 95  Resp:  [20-24] 22  SpO2:  [86 %-99 %] 91 %  BP: (139-163)/(72-95) 140/73     Weight: 54.4 kg (120 lb)  Body mass index is 20.92 kg/m².     Physical Exam  HENT:      Head: Normocephalic and atraumatic.   Eyes:      Pupils: Pupils are equal, round, and reactive to light.   Cardiovascular:      Rate and Rhythm: Normal rate.      Pulses: Normal pulses.      Heart sounds: Normal heart sounds.   Pulmonary:      Effort: Pulmonary effort is normal.      Breath sounds: Normal breath sounds.   Abdominal:      General: Bowel sounds are normal.      Palpations: Abdomen is  soft.   Neurological:      General: No focal deficit present.      Mental Status: She is alert and oriented to person, place, and time. Mental status is at baseline.   Psychiatric:         Mood and Affect: Mood normal.         Behavior: Behavior normal.         Thought Content: Thought content normal.         Judgment: Judgment normal.              CRANIAL NERVES     CN III, IV, VI   Pupils are equal, round, and reactive to light.       Significant Labs: All pertinent labs within the past 24 hours have been reviewed.    Significant Imaging: I have reviewed all pertinent imaging results/findings within the past 24 hours.    Assessment/Plan:     SOB (shortness of breath)    On exertion and  Upon lying flat  Improved by rest and bending forward  Chest is clear on exam  Minimal edema LL b/L , but patient is on norvasc  CTA of chest showed no PE    Plan:  Keep patient on supplemental oxygen target spo2 >94%  Echo  CXR  halodol 3mg IV PRN  seroquel daily HS      Hypertension  Continue home medications:   norvasc 5mg and hydrochlorothiazide 25mg po daily    Coronary artery disease involving native coronary artery of native heart without angina pectoris  CAD on aspirin and lipitor as well as coreg    PLAN:    Continue asa and lipitor          VTE Risk Mitigation (From admission, onward)           Ordered     enoxaparin injection 40 mg  Daily         10/28/23 1534     IP VTE HIGH RISK PATIENT  Once         10/28/23 1534     Place sequential compression device  Until discontinued         10/28/23 1534                           On 10/28/2023, patient should be placed in hospital observation services under my care in collaboration with Dr. Chaparro.        Mari Diaz MD  Department of Hospital Medicine  [facility] - Emergency Dept    DUE - declined by patient and COMPLETED  Family history is reviewed and has not changed   Pertinent information:

## 2023-10-28 NOTE — ASSESSMENT & PLAN NOTE
On exertion and  Upon lying flat  Improved by rest and bending forward  Chest is clear on exam  Minimal edema LL b/L , but patient is on norvasc  CTA of chest showed no PE    Plan:  Keep patient on supplemental oxygen target spo2 >94%  Echo  CXR  halodol 3mg IV PRN  seroquel daily HS

## 2023-10-28 NOTE — SUBJECTIVE & OBJECTIVE
Past Medical History:   Diagnosis Date    Allergic rhinitis     Amblyopia     Carotid stenosis     Coronary atherosclerosis     Outside [facility] 6/2014: 20% mLAD, 50% mCx, 20%, mRCA    Dyslipidemia     ESBL (extended spectrum beta-lactamase) producing bacteria infection     UTI    Hypertension     Nausea and vomiting 05/26/2017    SAH (subarachnoid hemorrhage) 08/24/2016 1999, s/p clipping    Subarachnoid hemorrhage due to ruptured aneurysm 1999       Past Surgical History:   Procedure Laterality Date    ADENOIDECTOMY      ANTERIOR CRUCIATE LIGAMENT REPAIR  2012    APPENDECTOMY      CATARACT EXTRACTION W/  INTRAOCULAR LENS IMPLANT Right 11/07/2022    Procedure: EXTRACTION, CATARACT, WITH IOL INSERTION;  Surgeon: Higinio Cristina MD;  Location: [facility];  Service: Ophthalmology;  Laterality: Right;    CATARACT EXTRACTION W/  INTRAOCULAR LENS IMPLANT Left 11/21/2022    Procedure: EXTRACTION, CATARACT, WITH IOL INSERTION;  Surgeon: Higinio Cristina MD;  Location: [facility];  Service: Ophthalmology;  Laterality: Left;    CEREBRAL ANEURYSM REPAIR  1999    clip    DENTAL SURGERY      EYE SURGERY Bilateral     cataract removal and lens implants    HIP ARTHROPLASTY Left 05/28/2023    Procedure: TOTAL HIP ARTHROPLASTY;  Surgeon: Juan Wan MD;  Location: [address];  Service: Orthopedics;  Laterality: Left;    TONSILLECTOMY         Review of patient's allergies indicates:  No Known Allergies    Current Facility-Administered Medications on File Prior to Encounter   Medication    mupirocin 2 % ointment    tranexamic acid (CYKLOKAPRON) 1,000 mg in sodium chloride 0.9 % 100 mL IVPB (MB+)    tranexamic acid (CYKLOKAPRON) 1,000 mg in sodium chloride 0.9 % 100 mL IVPB (MB+)     Current Outpatient Medications on File Prior to Encounter   Medication Sig    acetaminophen (TYLENOL) 650 MG TbSR Take 1 tablet (650 mg total) by mouth every 6 to 8 hours as needed (pain (mild-moderate)).    allopurinoL (ZYLOPRIM) 100 MG tablet  Take 2 tablets (200 mg total) by mouth once daily.    amLODIPine (NORVASC) 5 MG tablet TAKE 1 TABLET BY MOUTH EVERY DAY    aspirin (ECOTRIN) 81 MG EC tablet Take 81 mg by mouth once daily.    atorvastatin (LIPITOR) 80 MG tablet TAKE 1 TABLET BY MOUTH EVERY DAY    benzonatate (TESSALON) 200 MG capsule Take 200 mg by mouth 3 (three) times daily as needed for Cough.    carvediloL (COREG) 6.25 MG tablet Take 1 tablet (6.25 mg total) by mouth 2 (two) times daily with meals.    celecoxib (CELEBREX) 200 MG capsule Take 1 capsule (200 mg total) by mouth once daily. (Patient taking differently: Take 200 mg by mouth as needed.)    colchicine, gout, (COLCRYS) 0.6 mg tablet Take 1 tablet (0.6 mg total) by mouth once daily. (Patient taking differently: Take 0.6 mg by mouth once daily. As needed for gout)    diphenhydrAMINE-acetaminophen (TYLENOL PM)  mg Tab Take 2 tablets by mouth nightly as needed. Pain    fluticasone propionate (FLONASE) 50 mcg/actuation nasal spray SPRAY ONCE INTO EACH NOSTRIL ONCE DAILY    hydroCHLOROthiazide (HYDRODIURIL) 25 MG tablet Take 1 tablet (25 mg total) by mouth once daily.    melatonin (MELATIN) 3 mg tablet Take 2 tablets (6 mg total) by mouth nightly as needed for Insomnia.    montelukast (SINGULAIR) 10 mg tablet Take 1 tablet (10 mg total) by mouth every evening.    pantoprazole (PROTONIX) 40 MG tablet Take 1 tablet (40 mg total) by mouth once daily.    prenatal no122/iron/folic acid (PRENATAL MULTI ORAL) Take 1 tablet by mouth once daily.    [DISCONTINUED] clotrimazole (LOTRIMIN) 1 % Soln Apply topically once daily. To affected toenails. (Patient not taking: Reported on 10/16/2023)    [DISCONTINUED] oxyCODONE (ROXICODONE) 5 MG immediate release tablet Take 1 tablet (5 mg total) by mouth every 4 to 6 hours as needed for Pain. (Moderate-severe)     Family History       Problem Relation (Age of Onset)    Alcohol abuse Father    Aneurysm Mother    Gout Brother    Heart disease Brother     Hypertension Mother, Father    Kidney disease Brother    No Known Problems Daughter, Daughter, Daughter          Tobacco Use    Smoking status: Former     Current packs/day: 0.00     Average packs/day: 0.5 packs/day for 20.0 years (10.0 ttl pk-yrs)     Types: Cigarettes     Start date: 1979     Quit date: 1999     Years since quittin.8     Passive exposure: Past    Smokeless tobacco: Never   Substance and Sexual Activity    Alcohol use: Yes     Alcohol/week: 7.0 standard drinks of alcohol     Types: 7 Glasses of wine per week     Comment: One glass of wine prior to dinner    Drug use: No    Sexual activity: Yes     Partners: Male     Review of Systems   Constitutional:  Positive for fatigue. Negative for activity change, appetite change and diaphoresis.   Respiratory:  Positive for chest tightness and shortness of breath. Negative for wheezing.    Cardiovascular:  Positive for leg swelling (very minimal). Negative for chest pain.   Gastrointestinal:  Negative for abdominal distention and abdominal pain.   Genitourinary:  Negative for difficulty urinating, dysuria and hematuria.   Skin: Negative.    Neurological: Negative.    Psychiatric/Behavioral:  The patient is nervous/anxious.      Objective:     Vital Signs (Most Recent):  Temp: 97.9 °F (36.6 °C) (10/28/23 0807)  Pulse: 95 (10/28/23 1302)  Resp: (!) 22 (10/28/23 1207)  BP: (!) 140/73 (10/28/23 1302)  SpO2: (!) 91 % (10/28/23 1302) Vital Signs (24h Range):  Temp:  [97.9 °F (36.6 °C)] 97.9 °F (36.6 °C)  Pulse:  [80-95] 95  Resp:  [20-24] 22  SpO2:  [86 %-99 %] 91 %  BP: (139-163)/(72-95) 140/73     Weight: 54.4 kg (120 lb)  Body mass index is 20.92 kg/m².     Physical Exam  HENT:      Head: Normocephalic and atraumatic.   Eyes:      Pupils: Pupils are equal, round, and reactive to light.   Cardiovascular:      Rate and Rhythm: Normal rate.      Pulses: Normal pulses.      Heart sounds: Normal heart sounds.   Pulmonary:      Effort: Pulmonary effort  is normal.      Breath sounds: Normal breath sounds.   Abdominal:      General: Bowel sounds are normal.      Palpations: Abdomen is soft.   Neurological:      General: No focal deficit present.      Mental Status: She is alert and oriented to person, place, and time. Mental status is at baseline.   Psychiatric:         Mood and Affect: Mood normal.         Behavior: Behavior normal.         Thought Content: Thought content normal.         Judgment: Judgment normal.              CRANIAL NERVES     CN III, IV, VI   Pupils are equal, round, and reactive to light.       Significant Labs: All pertinent labs within the past 24 hours have been reviewed.    Significant Imaging: I have reviewed all pertinent imaging results/findings within the past 24 hours.

## 2023-10-28 NOTE — PLAN OF CARE
[facility] - Emergency Dept  Initial Discharge Assessment       Primary Care Provider: Effie Olmedo MD    Admission Diagnosis: SOB (shortness of breath) [R06.02]    Admission Date: 10/28/2023  Expected Discharge Date:     Pt stated she is independent with her ADL's and uses a quad cane to assist with ambulation.      Pt stated she had Home Health in the past with [facility] and would like to re-admit with [facility].    Pt to d/c with home health when ready    Transition of Care Barriers: (P) None    Payor: [employer] MEDICARE / Plan: [employer] MEDICARE HMO / Product Type: Capitation /     Extended Emergency Contact Information  Primary Emergency Contact: FloydSandie bae  Address: [address] [place] [address] [place]  Work Phone: [phone]  Mobile Phone: [phone]  Relation: Spouse  Secondary Emergency Contact: Ramila Floyd  Address: [address] [place] [address] [place]  Mobile Phone: [phone]  Relation: Daughter    Discharge Plan A: (P) Home Health  Discharge Plan B: (P) Home Health      [id number] - [place], [place] - [address]  [address] [facility] [address]  Phone: [phone] Fax: [phone]    [facility] - [facility] [address]  [address] [facility] [address]  Phone: [phone] Fax: [phone]      Initial Assessment (most recent)       Adult Discharge Assessment - 10/28/23 1535          Discharge Assessment    Assessment Type Discharge Planning Assessment (P)      Confirmed/corrected address, phone number and insurance Yes (P)      Confirmed Demographics Correct on Facesheet (P)      Source of Information patient (P)      People in Home spouse (P)      Facility Arrived From: home (P)      Do you expect to return to your current living situation? Yes (P)      Do you have help at home or someone to help you manage your care at home? No (P)      Prior to  hospitilization cognitive status: Alert/Oriented;No Deficits (P)      Current cognitive status: Alert/Oriented;No Deficits (P)      Walking or Climbing Stairs ambulation difficulty, requires equipment (P)      Mobility Management quad cane (P)      Home Accessibility stairs to enter home (P)      Number of Stairs, Main Entrance eight (P)      Home Layout Able to live on 1st floor (P)      Equipment Currently Used at Home cane, quad;commode (P)      Patient currently being followed by outpatient case management? No (P)      Do you currently have service(s) that help you manage your care at home? No (P)      Do you have any problems affording any of your prescribed medications? No (P)      Is the patient taking medications as prescribed? yes (P)      Who is going to help you get home at discharge? family/friends (P)      How do you get to doctors appointments? family or friend will provide (P)      Are you on dialysis? No (P)      Do you take coumadin? No (P)      DME Needed Upon Discharge  none (P)      Discharge Plan discussed with: Patient (P)      Transition of Care Barriers None (P)      Discharge Plan A Home Health (P)      Discharge Plan B Home Health (P)         Physical Activity    On average, how many days per week do you engage in moderate to strenuous exercise (like a brisk walk)? 7 days (P)      On average, how many minutes do you engage in exercise at this level? 30 min (P)         Financial Resource Strain    How hard is it for you to pay for the very basics like food, housing, medical care, and heating? Not hard at all (P)         Housing Stability    In the last 12 months, was there a time when you were not able to pay the mortgage or rent on time? No (P)      In the last 12 months, was there a time when you did not have a steady place to sleep or slept in a shelter (including now)? No (P)         Transportation Needs    In the past 12 months, has lack of transportation kept you from medical  appointments or from getting medications? No (P)      In the past 12 months, has lack of transportation kept you from meetings, work, or from getting things needed for daily living? No (P)         Food Insecurity    Within the past 12 months, you worried that your food would run out before you got the money to buy more. Never true (P)      Within the past 12 months, the food you bought just didn't last and you didn't have money to get more. Never true (P)         Stress    Do you feel stress - tense, restless, nervous, or anxious, or unable to sleep at night because your mind is troubled all the time - these days? To some extent (P)         Social Connections    In a typical week, how many times do you talk on the phone with family, friends, or neighbors? More than three times a week (P)      How often do you get together with friends or relatives? More than three times a week (P)      How often do you attend Yazdanism or Sabianism services? More than 4 times per year (P)      Do you belong to any clubs or organizations such as Yazdanism groups, unions, fraternal or athletic groups, or school groups? No (P)      How often do you attend meetings of the clubs or organizations you belong to? Never (P)      Are you , , , , never , or living with a partner?  (P)         Alcohol Use    Q1: How often do you have a drink containing alcohol? 4 or more times a week (P)      Q2: How many drinks containing alcohol do you have on a typical day when you are drinking? 1 or 2 (P)      Q3: How often do you have six or more drinks on one occasion? Never (P)         OTHER    Name(s) of People in Home spouse Natesh (P)                       Discharge Plan A and Plan B have been determined by review of patient's clinical status, future medical and therapeutic needs, and coverage/benefits for post-acute care in coordination with multidisciplinary team members.      Pamela Worthington CD, MSW, LMSW,  LUÍS   Case Management  [facility]  Email: [email or web address]

## 2023-10-28 NOTE — PLAN OF CARE
10/28/23 1539   Post-Acute Status   Post-Acute Authorization Home Health   Home Health Status Pending medical clearance/testing   Discharge Delays None known at this time   Discharge Plan   Discharge Plan A Home Health     Discharge Plan A and Plan B have been determined by review of patient's clinical status, future medical and therapeutic needs, and coverage/benefits for post-acute care in coordination with multidisciplinary team members.    Met with pt to review discharge recommendation of home health and is agreeable to plan    Patient/family provided list of facilities in-network with patient's payor plan. Providers that are owned, operated, or affiliated with [facility] are included on the list.     Notified that referral sent to below listed facilities from in-network list based on proximity to home/family support:   [facility]   2.[facility]   3.  4.  5. (can send more than 5)    Patient/family instructed to identify preference.    Preferred Facility: (if more than 1, listed in order of descending preference)  No preference    If an additional preferred facility not listed above is identified, additional referral to be sent. If above facilities unable to accept, will send additional referrals to in-network providers.     Pamela Worthington, SAMARA, MSW, LMSW, RSW   Case Management  [facility]  Email: [email or web address]

## 2023-10-29 LAB
ALBUMIN SERPL BCP-MCNC: 3.1 G/DL (ref 3.5–5.2)
ALP SERPL-CCNC: 72 U/L (ref 55–135)
ALT SERPL W/O P-5'-P-CCNC: 17 U/L (ref 10–44)
ANION GAP SERPL CALC-SCNC: 16 MMOL/L (ref 8–16)
ANISOCYTOSIS BLD QL SMEAR: SLIGHT
APTT PPP: 26.3 SEC (ref 21–32)
ASCENDING AORTA: 4.14 CM
AST SERPL-CCNC: 27 U/L (ref 10–40)
AV INDEX (PROSTH): 0.64
AV MEAN GRADIENT: 4 MMHG
AV PEAK GRADIENT: 7 MMHG
AV VALVE AREA BY VELOCITY RATIO: 2.37 CM²
AV VALVE AREA: 2.45 CM²
AV VELOCITY RATIO: 0.62
BASOPHILS # BLD AUTO: 0.04 K/UL (ref 0–0.2)
BASOPHILS # BLD AUTO: 0.07 K/UL (ref 0–0.2)
BASOPHILS NFR BLD: 0.4 % (ref 0–1.9)
BASOPHILS NFR BLD: 0.4 % (ref 0–1.9)
BILIRUB SERPL-MCNC: 0.6 MG/DL (ref 0.1–1)
BSA FOR ECHO PROCEDURE: 1.57 M2
BUN SERPL-MCNC: 16 MG/DL (ref 8–23)
CALCIUM SERPL-MCNC: 8.9 MG/DL (ref 8.7–10.5)
CHLORIDE SERPL-SCNC: 97 MMOL/L (ref 95–110)
CO2 SERPL-SCNC: 24 MMOL/L (ref 23–29)
CREAT SERPL-MCNC: 0.8 MG/DL (ref 0.5–1.4)
CV ECHO LV RWT: 0.27 CM
DACRYOCYTES BLD QL SMEAR: ABNORMAL
DIFFERENTIAL METHOD: ABNORMAL
DIFFERENTIAL METHOD: ABNORMAL
DOP CALC AO PEAK VEL: 1.33 M/S
DOP CALC AO VTI: 27.32 CM
DOP CALC LVOT AREA: 3.8 CM2
DOP CALC LVOT DIAMETER: 2.2 CM
DOP CALC LVOT PEAK VEL: 0.83 M/S
DOP CALC LVOT STROKE VOLUME: 66.83 CM3
DOP CALCLVOT PEAK VEL VTI: 17.59 CM
E WAVE DECELERATION TIME: 244.94 MSEC
E/A RATIO: 0.7
E/E' RATIO: 9.17 M/S
ECHO LV POSTERIOR WALL: 0.67 CM (ref 0.6–1.1)
EOSINOPHIL # BLD AUTO: 0 K/UL (ref 0–0.5)
EOSINOPHIL # BLD AUTO: 0.1 K/UL (ref 0–0.5)
EOSINOPHIL NFR BLD: 0 % (ref 0–8)
EOSINOPHIL NFR BLD: 0.4 % (ref 0–8)
ERYTHROCYTE [DISTWIDTH] IN BLOOD BY AUTOMATED COUNT: 20.5 % (ref 11.5–14.5)
ERYTHROCYTE [DISTWIDTH] IN BLOOD BY AUTOMATED COUNT: 20.6 % (ref 11.5–14.5)
EST. GFR  (NO RACE VARIABLE): >60 ML/MIN/1.73 M^2
FRACTIONAL SHORTENING: 36 % (ref 28–44)
GLUCOSE SERPL-MCNC: 150 MG/DL (ref 70–110)
HCT VFR BLD AUTO: 37.3 % (ref 37–48.5)
HCT VFR BLD AUTO: 38.9 % (ref 37–48.5)
HGB BLD-MCNC: 12.1 G/DL (ref 12–16)
HGB BLD-MCNC: 12.8 G/DL (ref 12–16)
HYPOCHROMIA BLD QL SMEAR: ABNORMAL
IMM GRANULOCYTES # BLD AUTO: 0.13 K/UL (ref 0–0.04)
IMM GRANULOCYTES # BLD AUTO: 0.2 K/UL (ref 0–0.04)
IMM GRANULOCYTES NFR BLD AUTO: 1.2 % (ref 0–0.5)
IMM GRANULOCYTES NFR BLD AUTO: 1.3 % (ref 0–0.5)
INR PPP: 1 (ref 0.8–1.2)
INTERVENTRICULAR SEPTUM: 0.79 CM (ref 0.6–1.1)
LA MAJOR: 3.88 CM
LA MINOR: 4.39 CM
LA WIDTH: 4.23 CM
LEFT ATRIUM SIZE: 2.93 CM
LEFT ATRIUM VOLUME INDEX MOD: 30.1 ML/M2
LEFT ATRIUM VOLUME INDEX: 27.5 ML/M2
LEFT ATRIUM VOLUME MOD: 47.6 CM3
LEFT ATRIUM VOLUME: 43.4 CM3
LEFT INTERNAL DIMENSION IN SYSTOLE: 3.18 CM (ref 2.1–4)
LEFT VENTRICLE DIASTOLIC VOLUME INDEX: 73.99 ML/M2
LEFT VENTRICLE DIASTOLIC VOLUME: 116.9 ML
LEFT VENTRICLE MASS INDEX: 76 G/M2
LEFT VENTRICLE SYSTOLIC VOLUME INDEX: 25.6 ML/M2
LEFT VENTRICLE SYSTOLIC VOLUME: 40.37 ML
LEFT VENTRICULAR INTERNAL DIMENSION IN DIASTOLE: 4.98 CM (ref 3.5–6)
LEFT VENTRICULAR MASS: 120.06 G
LV LATERAL E/E' RATIO: 6.88 M/S
LV SEPTAL E/E' RATIO: 13.75 M/S
LYMPHOCYTES # BLD AUTO: 1.4 K/UL (ref 1–4.8)
LYMPHOCYTES # BLD AUTO: 1.7 K/UL (ref 1–4.8)
LYMPHOCYTES NFR BLD: 13.4 % (ref 18–48)
LYMPHOCYTES NFR BLD: 9.8 % (ref 18–48)
MAGNESIUM SERPL-MCNC: 1.6 MG/DL (ref 1.6–2.6)
MCH RBC QN AUTO: 33.9 PG (ref 27–31)
MCH RBC QN AUTO: 34.1 PG (ref 27–31)
MCHC RBC AUTO-ENTMCNC: 32.4 G/DL (ref 32–36)
MCHC RBC AUTO-ENTMCNC: 32.9 G/DL (ref 32–36)
MCV RBC AUTO: 104 FL (ref 82–98)
MCV RBC AUTO: 105 FL (ref 82–98)
MONOCYTES # BLD AUTO: 1.9 K/UL (ref 0.3–1)
MONOCYTES # BLD AUTO: 3.6 K/UL (ref 0.3–1)
MONOCYTES NFR BLD: 19.1 % (ref 4–15)
MONOCYTES NFR BLD: 21.1 % (ref 4–15)
MV PEAK A VEL: 0.79 M/S
MV PEAK E VEL: 0.55 M/S
MV STENOSIS PRESSURE HALF TIME: 71.03 MS
MV VALVE AREA P 1/2 METHOD: 3.1 CM2
NEUTROPHILS # BLD AUTO: 11.5 K/UL (ref 1.8–7.7)
NEUTROPHILS # BLD AUTO: 6.7 K/UL (ref 1.8–7.7)
NEUTROPHILS NFR BLD: 65.8 % (ref 38–73)
NEUTROPHILS NFR BLD: 67.1 % (ref 38–73)
NRBC BLD-RTO: 0 /100 WBC
NRBC BLD-RTO: 0 /100 WBC
OVALOCYTES BLD QL SMEAR: ABNORMAL
PHOSPHATE SERPL-MCNC: 2.7 MG/DL (ref 2.7–4.5)
PISA TR MAX VEL: 2.84 M/S
PLATELET # BLD AUTO: 346 K/UL (ref 150–450)
PLATELET # BLD AUTO: 393 K/UL (ref 150–450)
PLATELET BLD QL SMEAR: ABNORMAL
PMV BLD AUTO: 10.2 FL (ref 9.2–12.9)
PMV BLD AUTO: 10.5 FL (ref 9.2–12.9)
POIKILOCYTOSIS BLD QL SMEAR: SLIGHT
POLYCHROMASIA BLD QL SMEAR: ABNORMAL
POTASSIUM SERPL-SCNC: 3.2 MMOL/L (ref 3.5–5.1)
PROT SERPL-MCNC: 6.7 G/DL (ref 6–8.4)
PROTHROMBIN TIME: 10.3 SEC (ref 9–12.5)
RA MAJOR: 4.35 CM
RA PRESSURE ESTIMATED: 8 MMHG
RA WIDTH: 4.45 CM
RBC # BLD AUTO: 3.57 M/UL (ref 4–5.4)
RBC # BLD AUTO: 3.75 M/UL (ref 4–5.4)
RIGHT VENTRICULAR END-DIASTOLIC DIMENSION: 3.6 CM
RV TB RVSP: 11 MMHG
SINUS: 3.69 CM
SODIUM SERPL-SCNC: 137 MMOL/L (ref 136–145)
STJ: 2.98 CM
TARGETS BLD QL SMEAR: ABNORMAL
TDI LATERAL: 0.08 M/S
TDI SEPTAL: 0.04 M/S
TDI: 0.06 M/S
TR MAX PG: 32 MMHG
TRICUSPID ANNULAR PLANE SYSTOLIC EXCURSION: 1.78 CM
TROPONIN I SERPL DL<=0.01 NG/ML-MCNC: 0.02 NG/ML (ref 0–0.03)
TV REST PULMONARY ARTERY PRESSURE: 40 MMHG
WBC # BLD AUTO: 10.1 K/UL (ref 3.9–12.7)
WBC # BLD AUTO: 17.08 K/UL (ref 3.9–12.7)
Z-SCORE OF LEFT VENTRICULAR DIMENSION IN END DIASTOLE: 0.96
Z-SCORE OF LEFT VENTRICULAR DIMENSION IN END SYSTOLE: 0.99

## 2023-10-29 PROCEDURE — 85610 PROTHROMBIN TIME: CPT | Mod: HCNC

## 2023-10-29 PROCEDURE — G0378 HOSPITAL OBSERVATION PER HR: HCPCS | Mod: HCNC

## 2023-10-29 PROCEDURE — 96375 TX/PRO/DX INJ NEW DRUG ADDON: CPT

## 2023-10-29 PROCEDURE — 94761 N-INVAS EAR/PLS OXIMETRY MLT: CPT | Mod: HCNC

## 2023-10-29 PROCEDURE — 25000242 PHARM REV CODE 250 ALT 637 W/ HCPCS: Mod: HCNC

## 2023-10-29 PROCEDURE — 36415 COLL VENOUS BLD VENIPUNCTURE: CPT | Mod: HCNC

## 2023-10-29 PROCEDURE — 93010 ELECTROCARDIOGRAM REPORT: CPT | Mod: HCNC,,, | Performed by: INTERNAL MEDICINE

## 2023-10-29 PROCEDURE — 93005 ELECTROCARDIOGRAM TRACING: CPT | Mod: HCNC

## 2023-10-29 PROCEDURE — 25000003 PHARM REV CODE 250: Mod: HCNC

## 2023-10-29 PROCEDURE — 96365 THER/PROPH/DIAG IV INF INIT: CPT

## 2023-10-29 PROCEDURE — 84484 ASSAY OF TROPONIN QUANT: CPT | Mod: HCNC

## 2023-10-29 PROCEDURE — 80053 COMPREHEN METABOLIC PANEL: CPT | Mod: HCNC

## 2023-10-29 PROCEDURE — 96366 THER/PROPH/DIAG IV INF ADDON: CPT

## 2023-10-29 PROCEDURE — 83735 ASSAY OF MAGNESIUM: CPT | Mod: HCNC

## 2023-10-29 PROCEDURE — 85730 THROMBOPLASTIN TIME PARTIAL: CPT | Mod: HCNC

## 2023-10-29 PROCEDURE — 85025 COMPLETE CBC W/AUTO DIFF WBC: CPT | Mod: HCNC

## 2023-10-29 PROCEDURE — 93010 EKG 12-LEAD: ICD-10-PCS | Mod: 76,HCNC,, | Performed by: INTERNAL MEDICINE

## 2023-10-29 PROCEDURE — 86682 HELMINTH ANTIBODY: CPT | Mod: HCNC | Performed by: STUDENT IN AN ORGANIZED HEALTH CARE EDUCATION/TRAINING PROGRAM

## 2023-10-29 PROCEDURE — 27000221 HC OXYGEN, UP TO 24 HOURS: Mod: HCNC

## 2023-10-29 PROCEDURE — 63600175 PHARM REV CODE 636 W HCPCS: Mod: HCNC

## 2023-10-29 PROCEDURE — 36415 COLL VENOUS BLD VENIPUNCTURE: CPT | Mod: HCNC | Performed by: STUDENT IN AN ORGANIZED HEALTH CARE EDUCATION/TRAINING PROGRAM

## 2023-10-29 PROCEDURE — 25000003 PHARM REV CODE 250: Mod: HCNC | Performed by: STUDENT IN AN ORGANIZED HEALTH CARE EDUCATION/TRAINING PROGRAM

## 2023-10-29 PROCEDURE — 93010 ELECTROCARDIOGRAM REPORT: CPT | Mod: 76,HCNC,, | Performed by: INTERNAL MEDICINE

## 2023-10-29 PROCEDURE — 96367 TX/PROPH/DG ADDL SEQ IV INF: CPT

## 2023-10-29 PROCEDURE — 84100 ASSAY OF PHOSPHORUS: CPT | Mod: HCNC

## 2023-10-29 PROCEDURE — 96372 THER/PROPH/DIAG INJ SC/IM: CPT

## 2023-10-29 RX ORDER — ESCITALOPRAM OXALATE 5 MG/1
5 TABLET ORAL DAILY
Status: CANCELLED | OUTPATIENT
Start: 2023-10-29

## 2023-10-29 RX ORDER — POTASSIUM CHLORIDE 20 MEQ/1
20 TABLET, EXTENDED RELEASE ORAL ONCE
Status: COMPLETED | OUTPATIENT
Start: 2023-10-29 | End: 2023-10-29

## 2023-10-29 RX ORDER — MAGNESIUM SULFATE 1 G/100ML
1 INJECTION INTRAVENOUS ONCE
Status: COMPLETED | OUTPATIENT
Start: 2023-10-29 | End: 2023-10-29

## 2023-10-29 RX ORDER — HYDROXYZINE HYDROCHLORIDE 25 MG/1
25 TABLET, FILM COATED ORAL 3 TIMES DAILY PRN
Status: CANCELLED | OUTPATIENT
Start: 2023-10-29

## 2023-10-29 RX ORDER — ONDANSETRON 2 MG/ML
4 INJECTION INTRAMUSCULAR; INTRAVENOUS ONCE
Status: CANCELLED | OUTPATIENT
Start: 2023-10-29

## 2023-10-29 RX ORDER — HEPARIN SODIUM,PORCINE/D5W 25000/250
0-40 INTRAVENOUS SOLUTION INTRAVENOUS CONTINUOUS
Status: DISCONTINUED | OUTPATIENT
Start: 2023-10-29 | End: 2023-10-30

## 2023-10-29 RX ORDER — HYDROXYZINE HYDROCHLORIDE 25 MG/1
25 TABLET, FILM COATED ORAL ONCE
Status: COMPLETED | OUTPATIENT
Start: 2023-10-29 | End: 2023-10-29

## 2023-10-29 RX ORDER — ONDANSETRON 4 MG/1
4 TABLET, FILM COATED ORAL ONCE
Status: COMPLETED | OUTPATIENT
Start: 2023-10-29 | End: 2023-10-29

## 2023-10-29 RX ORDER — FOLIC ACID 1 MG/1
1 TABLET ORAL DAILY
Status: DISCONTINUED | OUTPATIENT
Start: 2023-10-29 | End: 2023-10-31 | Stop reason: HOSPADM

## 2023-10-29 RX ORDER — ONDANSETRON 2 MG/ML
4 INJECTION INTRAMUSCULAR; INTRAVENOUS ONCE AS NEEDED
Status: COMPLETED | OUTPATIENT
Start: 2023-10-29 | End: 2023-10-29

## 2023-10-29 RX ORDER — FUROSEMIDE 10 MG/ML
40 INJECTION INTRAMUSCULAR; INTRAVENOUS ONCE
Status: COMPLETED | OUTPATIENT
Start: 2023-10-29 | End: 2023-10-29

## 2023-10-29 RX ORDER — THIAMINE HCL 100 MG
100 TABLET ORAL 3 TIMES DAILY
Status: DISCONTINUED | OUTPATIENT
Start: 2023-11-01 | End: 2023-10-31 | Stop reason: HOSPADM

## 2023-10-29 RX ADMIN — ONDANSETRON 4 MG: 2 INJECTION INTRAMUSCULAR; INTRAVENOUS at 08:10

## 2023-10-29 RX ADMIN — ENOXAPARIN SODIUM 40 MG: 40 INJECTION SUBCUTANEOUS at 04:10

## 2023-10-29 RX ADMIN — FLUTICASONE PROPIONATE 50 MCG: 50 SPRAY, METERED NASAL at 09:10

## 2023-10-29 RX ADMIN — POTASSIUM BICARBONATE 40 MEQ: 391 TABLET, EFFERVESCENT ORAL at 12:10

## 2023-10-29 RX ADMIN — ASPIRIN 81 MG: 81 TABLET, COATED ORAL at 09:10

## 2023-10-29 RX ADMIN — THIAMINE HYDROCHLORIDE 250 MG: 100 INJECTION, SOLUTION INTRAMUSCULAR; INTRAVENOUS at 12:10

## 2023-10-29 RX ADMIN — ATORVASTATIN CALCIUM 80 MG: 40 TABLET, FILM COATED ORAL at 08:10

## 2023-10-29 RX ADMIN — ONDANSETRON 4 MG: 4 TABLET ORAL at 02:10

## 2023-10-29 RX ADMIN — ALLOPURINOL 200 MG: 100 TABLET ORAL at 08:10

## 2023-10-29 RX ADMIN — HYDROXYZINE HYDROCHLORIDE 25 MG: 25 TABLET, FILM COATED ORAL at 09:10

## 2023-10-29 RX ADMIN — QUETIAPINE FUMARATE 50 MG: 25 TABLET ORAL at 08:10

## 2023-10-29 RX ADMIN — POTASSIUM CHLORIDE 20 MEQ: 1500 TABLET, EXTENDED RELEASE ORAL at 03:10

## 2023-10-29 RX ADMIN — ALUMINUM HYDROXIDE, MAGNESIUM HYDROXIDE, AND SIMETHICONE 30 ML: 200; 200; 20 SUSPENSION ORAL at 10:10

## 2023-10-29 RX ADMIN — HEPARIN SODIUM AND DEXTROSE 12 UNITS/KG/HR: 10000; 5 INJECTION INTRAVENOUS at 06:10

## 2023-10-29 RX ADMIN — Medication 3 MG: at 08:10

## 2023-10-29 RX ADMIN — ALUMINUM HYDROXIDE, MAGNESIUM HYDROXIDE, AND SIMETHICONE 30 ML: 200; 200; 20 SUSPENSION ORAL at 03:10

## 2023-10-29 RX ADMIN — ALUMINUM HYDROXIDE, MAGNESIUM HYDROXIDE, AND SIMETHICONE 30 ML: 200; 200; 20 SUSPENSION ORAL at 08:10

## 2023-10-29 RX ADMIN — HYDROCHLOROTHIAZIDE 25 MG: 25 TABLET ORAL at 08:10

## 2023-10-29 RX ADMIN — AMLODIPINE BESYLATE 5 MG: 5 TABLET ORAL at 08:10

## 2023-10-29 RX ADMIN — PANTOPRAZOLE SODIUM 40 MG: 40 TABLET, DELAYED RELEASE ORAL at 08:10

## 2023-10-29 RX ADMIN — FOLIC ACID 1 MG: 1 TABLET ORAL at 12:10

## 2023-10-29 RX ADMIN — MAGNESIUM SULFATE HEPTAHYDRATE 1 G: 500 INJECTION, SOLUTION INTRAMUSCULAR; INTRAVENOUS at 01:10

## 2023-10-29 RX ADMIN — THERA TABS 1 TABLET: TAB at 12:10

## 2023-10-29 RX ADMIN — CARVEDILOL 6.25 MG: 6.25 TABLET, FILM COATED ORAL at 08:10

## 2023-10-29 RX ADMIN — CARVEDILOL 6.25 MG: 6.25 TABLET, FILM COATED ORAL at 04:10

## 2023-10-29 RX ADMIN — FUROSEMIDE 40 MG: 10 INJECTION, SOLUTION INTRAVENOUS at 03:10

## 2023-10-29 NOTE — ED NOTES
Pt care assumed. Report received by BARRY Escamilla. Pt lying in stretcher in low and locked position and side rails raised x2. Call light, pt's belongings, and bedside table within pt's reach. Pt on continuous cardiac monitoring, pulse oximetry, and BP cycling every 30 minutes. Pt in NAD and verbalized no needs at this time.

## 2023-10-29 NOTE — PLAN OF CARE
Pt is AAOx4. Pt remain free from fall and injuries. VS remain stable with episodes of N/V after eating lunch. New onset of Afib per MD and Heparin due to start.  Questions and concerns voiced and answered. Medication compliance. Call light in reach.  at the bedside.  Bed in low locked position. Side rails x2. Belongings at bedside.

## 2023-10-29 NOTE — SUBJECTIVE & OBJECTIVE
Interval History: slept well on seroquel last night    Review of Systems   Constitutional:  Negative for activity change and appetite change.   HENT: Negative.     Eyes:  Negative for pain and discharge.   Respiratory:  Negative for chest tightness, shortness of breath (improved) and wheezing.    Cardiovascular:  Negative for chest pain, palpitations and leg swelling.   Musculoskeletal: Negative.    Neurological: Negative.    Psychiatric/Behavioral: Negative.       Objective:     Vital Signs (Most Recent):  Temp: 97.3 °F (36.3 °C) (10/29/23 1100)  Pulse: 74 (10/29/23 1503)  Resp: 16 (10/29/23 1100)  BP: 132/79 (10/29/23 1100)  SpO2: (!) 90 % (10/29/23 1100) Vital Signs (24h Range):  Temp:  [96.7 °F (35.9 °C)-97.7 °F (36.5 °C)] 97.3 °F (36.3 °C)  Pulse:  [] 74  Resp:  [16-20] 16  SpO2:  [90 %-93 %] 90 %  BP: (124-153)/(75-85) 132/79     Weight: 54.9 kg (121 lb)  Body mass index is 20.77 kg/m².  No intake or output data in the 24 hours ending 10/29/23 1604      Physical Exam  HENT:      Head: Normocephalic and atraumatic.   Eyes:      Pupils: Pupils are equal, round, and reactive to light.   Cardiovascular:      Rate and Rhythm: Normal rate.      Pulses: Normal pulses.      Heart sounds: No murmur heard.  Pulmonary:      Effort: Pulmonary effort is normal. No respiratory distress.   Abdominal:      General: Bowel sounds are normal.      Palpations: Abdomen is soft.   Neurological:      General: No focal deficit present.      Mental Status: She is alert and oriented to person, place, and time. Mental status is at baseline.   Psychiatric:         Mood and Affect: Mood normal.         Behavior: Behavior normal.         Thought Content: Thought content normal.         Judgment: Judgment normal.             Significant Labs: All pertinent labs within the past 24 hours have been reviewed.    Significant Imaging: I have reviewed all pertinent imaging results/findings within the past 24 hours.

## 2023-10-29 NOTE — PLAN OF CARE
"This afternoon, patient complained of nausea. She later complained of "chest pain," and was assessed by team at bedside. She vomited her lunch and said that relieved her discomfort.   EKG and troponin ordered given unclear cardiac history and presenting complaint.  EKG showing new-onset Afib. Electronic read stating "concern for anterolateral and inferior ischemia" but on my independent review, largely similar to yesterday's EKG. Troponin pending.  Awaiting read on this morning's TTE.    - heparin drip for Afib stroke prophylaxis; already on carvedilol  - f/u troponin  - STAT EKG and repeat troponin if any complaints of nausea, chest pain, diaphoresis, jaw/arm pain  - continue tele  "

## 2023-10-29 NOTE — PROGRESS NOTES
"[facility] Medicine  Progress Note    Patient Name: Jessica Floyd  MRN: [id number]  Patient Class: OP- Observation   Admission Date: 10/28/2023  Length of Stay: 0 days  Attending Physician: Krysta Mooney MD  Primary Care Provider: Effie Olmedo MD        Subjective:     Principal Problem:SOB (shortness of breath)        HPI:  Ms. Floyd is a 72 yo F with a PMHx of SAH (1999), HTN, HLD, Anxiety, epsitaxis, vasovagal syncope, subclinical hyperthyroidism, carotid artery stent and allergic rhinitis who presents for SOB that started since Wednesday. She describes it is worse upon lying flat and on minimal exertion. Her SOB improves by sitting up. Pt also denotes that she developed a syncopal attack last Wednesday just prior to her shoulder replacement surgery. This is not her first syncopal attack. She says as usual it was preceded by a 'whoosh of nausea' and 'jaw clonus' afterwhich she passed out for a period of 4 minutes. She did not have frothing of her mouth, uprolling of eyes, nor did she lose consciousness. She says her anxiety is really affecting her life.    She says she was diagnosed with "stress induced reflex syncope" in [facility] in 2015 and has about 1 episode per year.     No chest pain, fevers, chills, night sweats, abdominal pain or N/V/D. She does have a remote smoking history where she smoked 1 PPD for 25 years but quit 20 years ago.     No fever, cough with sputum, no seizures, no central cardiac chest pain    At the ER her vitals:    Bp 140/73  spo2 91%  RR 22 HR 95%    Examination just yielded an anxious looking lady   With limited right shoulder mobility  Her lungs were clear  LL: very mild edema          Overview/Hospital Course:  Pt was admitted as a case of SOB for investigation. Pt was given seroquel at night which she helped her sleep and improved her anxiety.  Patient is scheduled for imaging in order to rule out causes of syncope (carotid doppler, US LL, echo). " Considering psych followup on discharge.      Interval History: slept well on seroquel last night    Review of Systems   Constitutional:  Negative for activity change and appetite change.   HENT: Negative.     Eyes:  Negative for pain and discharge.   Respiratory:  Negative for chest tightness, shortness of breath (improved) and wheezing.    Cardiovascular:  Negative for chest pain, palpitations and leg swelling.   Musculoskeletal: Negative.    Neurological: Negative.    Psychiatric/Behavioral: Negative.       Objective:     Vital Signs (Most Recent):  Temp: 97.3 °F (36.3 °C) (10/29/23 1100)  Pulse: 74 (10/29/23 1503)  Resp: 16 (10/29/23 1100)  BP: 132/79 (10/29/23 1100)  SpO2: (!) 90 % (10/29/23 1100) Vital Signs (24h Range):  Temp:  [96.7 °F (35.9 °C)-97.7 °F (36.5 °C)] 97.3 °F (36.3 °C)  Pulse:  [] 74  Resp:  [16-20] 16  SpO2:  [90 %-93 %] 90 %  BP: (124-153)/(75-85) 132/79     Weight: 54.9 kg (121 lb)  Body mass index is 20.77 kg/m².  No intake or output data in the 24 hours ending 10/29/23 1604      Physical Exam  HENT:      Head: Normocephalic and atraumatic.   Eyes:      Pupils: Pupils are equal, round, and reactive to light.   Cardiovascular:      Rate and Rhythm: Normal rate.      Pulses: Normal pulses.      Heart sounds: No murmur heard.  Pulmonary:      Effort: Pulmonary effort is normal. No respiratory distress.   Abdominal:      General: Bowel sounds are normal.      Palpations: Abdomen is soft.   Neurological:      General: No focal deficit present.      Mental Status: She is alert and oriented to person, place, and time. Mental status is at baseline.   Psychiatric:         Mood and Affect: Mood normal.         Behavior: Behavior normal.         Thought Content: Thought content normal.         Judgment: Judgment normal.             Significant Labs: All pertinent labs within the past 24 hours have been reviewed.    Significant Imaging: I have reviewed all pertinent imaging results/findings within  the past 24 hours.      Assessment/Plan:      * SOB (shortness of breath)    On exertion and  Upon lying flat  Improved by rest and bending forward  Chest is clear on exam  Minimal edema LL b/L , but patient is on norvasc  CTA of chest showed no PE    Plan:  Keep patient on supplemental oxygen target spo2 >94%  Echo  CXR  halodol 3mg IV PRN  seroquel daily HS  lexapro to be added      Severe anxiety  Patient admitted to using alcohol to alleviate anxiety lately.  She has abruptly stopped it        Eosinophilia  Strongyloides workup pending        Hypertension  Continue home medications:   norvasc 5mg and hydrochlorothiazide 25mg po daily    Coronary artery disease involving native coronary artery of native heart without angina pectoris  CAD on aspirin and lipitor as well as coreg    PLAN:    Continue asa and lipitor          VTE Risk Mitigation (From admission, onward)         Ordered     enoxaparin injection 40 mg  Daily         10/28/23 1534     IP VTE HIGH RISK PATIENT  Once         10/28/23 1534     Place sequential compression device  Until discontinued         10/28/23 1534                Discharge Planning   DEBBIE: 10/30/2023     Code Status: Full Code   Is the patient medically ready for discharge?: No    Reason for patient still in hospital (select all that apply): Treatment  Discharge Plan A: Home Health   Discharge Delays: None known at this time              Mari Diaz MD  Department of Hospital Medicine   [facility]

## 2023-10-29 NOTE — ASSESSMENT & PLAN NOTE
On exertion and  Upon lying flat  Improved by rest and bending forward  Chest is clear on exam  Minimal edema LL b/L , but patient is on norvasc  CTA of chest showed no PE    Plan:  Keep patient on supplemental oxygen target spo2 >94%  Echo  CXR  halodol 3mg IV PRN  seroquel daily HS  lexapro to be added

## 2023-10-29 NOTE — ED NOTES
Telemetry Verification   Patient placed on Telemetry Box  Verified with War Room  Box # [id number]   Monitor Tech Anne   Rate 94   Rhythm Normal Sinus

## 2023-10-29 NOTE — HOSPITAL COURSE
Pt was admitted as a case of SOB for investigation. Pt was given seroquel at night which she helped her sleep and improved her anxiety.  Patient is scheduled for imaging in order to rule out causes of syncope (carotid doppler, US LL, echo). Patient is slowly weaning off her oxygen requirements.No syncopal attacks during admission. Patient was found to have PACs with regular rhythm as well as pulmonary htn and cardiology input was for outpatient followup via echo to assess need for RHC. Considering psych followup on discharge. Patient was discharged with instructions to follow up with cardiology, psychiatry, and pulmonology.

## 2023-10-29 NOTE — PLAN OF CARE
Problem: Adult Inpatient Plan of Care  Goal: Plan of Care Review  Outcome: Ongoing, Progressing  Goal: Patient-Specific Goal (Individualized)  Outcome: Ongoing, Progressing  Goal: Absence of Hospital-Acquired Illness or Injury  Outcome: Ongoing, Progressing  Goal: Optimal Comfort and Wellbeing  Outcome: Ongoing, Progressing  Goal: Readiness for Transition of Care  Outcome: Ongoing, Progressing     Problem: Skin Injury Risk Increased  Goal: Skin Health and Integrity  Outcome: Ongoing, Progressing     Problem: Gas Exchange Impaired  Goal: Optimal Gas Exchange  Outcome: Ongoing, Progressing     Problem: Activity Intolerance (Pulmonary Impairment)  Goal: Improved Activity Tolerance  Outcome: Ongoing, Progressing     Problem: Airway Clearance Ineffective (Pulmonary Impairment)  Goal: Effective Airway Clearance  Outcome: Ongoing, Progressing     Problem: Gas Exchange Impaired (Pulmonary Impairment)  Goal: Optimal Gas Exchange  Outcome: Ongoing, Progressing

## 2023-10-29 NOTE — MEDICAL/APP STUDENT
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 Pt was scheduled for R shoulder replacement on 10/25, did not occur because she passed out for 3 minutes prior during pre-op evaluation. Felt wave of nausea pass over her prior to syncope.      Overnight Events:  No acute events, stable.Slightly irritable. Endorses continued SoB, slightly improved, but worsens with prolonged conversation and exertion while walking to bathroom. Feels seroquel was effective in reducing anxiety/worry and helped her sleep, but did not effect SoB.  Denies development of any new sxs. Denies CP, extremity pain, n/v, abdominal pain, tremor, HA.     Daughter privately mentioned patient does drink ETOH daily, between 1-6 drinks. Last drink was 10/27. To daughter's knowledge, patient has never had withdrawal symptoms, seizures, or altered mentation before.      Review of Systems   Constitutional:  Negative for chills, fever and weight loss.   HENT:          + rhinorrhea with postnasal drip   Respiratory:  Positive for shortness of breath. Negative for cough, sputum production and wheezing.    Cardiovascular:  Positive for palpitations and orthopnea. Negative for chest pain and leg swelling.   Gastrointestinal:  Negative for abdominal pain, constipation, diarrhea, nausea and vomiting.   Neurological:  Negative for tremors, weakness and headaches.   Psychiatric/Behavioral:  The patient is nervous/anxious.        Current Medications  Scheduled Meds:   allopurinoL  200 mg Oral Daily    aluminum-magnesium hydroxide-simethicone  30 mL Oral QID (AC & HS)    amLODIPine  5 mg Oral " Daily    aspirin  81 mg Oral Daily    atorvastatin  80 mg Oral Daily    carvediloL  6.25 mg Oral BID WM    enoxparin  40 mg Subcutaneous Daily    fluticasone propionate  1 spray Each Nostril Daily    folic acid  1 mg Oral Daily    hydroCHLOROthiazide  25 mg Oral Daily    magnesium sulfate IVPB  1 g Intravenous Once    multivitamin  1 tablet Oral Daily    pantoprazole  40 mg Oral Daily    potassium bicarbonate  40 mEq Oral Once    QUEtiapine  50 mg Oral QHS    thiamine (B-1) 250 mg in dextrose 5 % (D5W) 100 mL IVPB  250 mg Intravenous Daily    Followed by    [START ON 11/1/2023] thiamine  100 mg Oral TID     Continuous Infusions:  PRN Meds:.acetaminophen, aluminum-magnesium hydroxide-simethicone, dextrose 10%, glucagon (human recombinant), glucose, haloperidol lactate, melatonin, naloxone, sodium chloride 0.9%      Past Medical History:   Diagnosis Date    Allergic rhinitis     Amblyopia     Carotid stenosis     Coronary atherosclerosis     Outside [facility] 6/2014: 20% mLAD, 50% mCx, 20%, mRCA    Dyslipidemia     ESBL (extended spectrum beta-lactamase) producing bacteria infection     UTI    Hypertension     Nausea and vomiting 05/26/2017    SAH (subarachnoid hemorrhage) 08/24/2016 1999, s/p clipping    Subarachnoid hemorrhage due to ruptured aneurysm 1999      Past Surgical History:   Procedure Laterality Date    ADENOIDECTOMY      ANTERIOR CRUCIATE LIGAMENT REPAIR  2012    APPENDECTOMY      CATARACT EXTRACTION W/  INTRAOCULAR LENS IMPLANT Right 11/07/2022    Procedure: EXTRACTION, CATARACT, WITH IOL INSERTION;  Surgeon: Higinio Cristina MD;  Location: [facility] OR;  Service: Ophthalmology;  Laterality: Right;    CATARACT EXTRACTION W/  INTRAOCULAR LENS IMPLANT Left 11/21/2022    Procedure: EXTRACTION, CATARACT, WITH IOL INSERTION;  Surgeon: Higinio Cristina MD;  Location: [facility] OR;  Service: Ophthalmology;  Laterality: Left;    CEREBRAL ANEURYSM REPAIR  1999    clip    DENTAL SURGERY      EYE SURGERY Bilateral     cataract  removal and lens implants    HIP ARTHROPLASTY Left 05/28/2023    Procedure: TOTAL HIP ARTHROPLASTY;  Surgeon: Juan Wan MD;  Location: [facility] OR [address] [place] [address];  Service: Orthopedics;  Laterality: Left;    TONSILLECTOMY       Social Hx:  - daily alcohol use  - remote smoking history, quit 25 yrs ago        OBJECTIVE  Vitals  Temp: 96.7 °F (35.9 °C) (10/29 0754)  Pulse: 84 (10/29 1050)  Resp: 19 (10/29 0754)  BP: 153/78 (10/29 0754)  SpO2: 91 % (10/29 0754)     Physical Exam  Constitutional:       General: She is not in acute distress.  HENT:      Head: Normocephalic.      Mouth/Throat:      Mouth: Mucous membranes are moist.   Eyes:      Extraocular Movements: Extraocular movements intact.      Conjunctiva/sclera: Conjunctivae normal.   Cardiovascular:      Rate and Rhythm: Normal rate. Rhythm irregular.      Pulses: Normal pulses.      Comments: Occasional PACs?  Pulmonary:      Effort: No respiratory distress.      Breath sounds: No wheezing.      Comments: Poor inspiratory effort today  Clear to auscultation bilaterally  Dyspneic with prolonged conversation  Abdominal:      General: Abdomen is flat.      Palpations: Abdomen is soft.   Musculoskeletal:      Right lower leg: No edema.      Left lower leg: No edema.   Skin:     General: Skin is warm and dry.      Findings: Lesion (area of redness over gluteal cleft) present.   Neurological:      Mental Status: She is alert and oriented to person, place, and time. Mental status is at baseline.             Labs    Lab Results   Component Value Date     10/29/2023    K 3.2 (L) 10/29/2023    CL 97 10/29/2023    CO2 24 10/29/2023    BUN 16 10/29/2023    CREATININE 0.8 10/29/2023    CALCIUM 8.9 10/29/2023    ANIONGAP 16 10/29/2023    EGFRNORACEVR >60.0 10/29/2023       Lab Results   Component Value Date    WBC 10.10 10/29/2023    HGB 12.1 10/29/2023    HCT 37.3 10/29/2023     (H) 10/29/2023     10/29/2023     Lab Results   Component Value Date     WBC 10.10 10/29/2023    RBC 3.57 (L) 10/29/2023    HGB 12.1 10/29/2023    HCT 37.3 10/29/2023     (H) 10/29/2023    MCH 33.9 (H) 10/29/2023    MCHC 32.4 10/29/2023    RDW 20.6 (H) 10/29/2023     10/29/2023    MPV 10.2 10/29/2023    GRAN 6.7 10/29/2023    GRAN 65.8 10/29/2023    LYMPH 1.4 10/29/2023    LYMPH 13.4 (L) 10/29/2023    MONO 1.9 (H) 10/29/2023    MONO 19.1 (H) 10/29/2023    EOS 0.0 10/29/2023    BASO 0.04 10/29/2023    EOSINOPHIL 0.0 10/29/2023    BASOPHIL 0.4 10/29/2023        Lab Results   Component Value Date    TSH 0.719 10/28/2023         Imaging  CXR 10/28 - prominent cardiac silhouette    CTA 10/28 - emphysematous changes, patchy atelectasis or scarring, so sign of PE    US LE Veins BL - vessels patent, compressible    US Carotid BL - R carotid stented, 39% stenosis bilaterally    ECHO - unable to get read until 10/30      ASSESSMENT & PLAN    Shortness of Breath  Subjective improvement with supplemental O2 and ANTON  Need to r/o new onset CHF given hx of CAD and new onset SoB with orthopnea and Rosado, , especially given recent discovery of EtOH history. (last ECHO 5/17 EF 60-65%). PE/DVT unlikely given results of CTA and US LE.     Other differentials include acute anxiety and COPD. With 6 minute walk test, resting O2 93% on RA, exercising O2 89% on RA, exercising O2 increased to 93% with 2L.    Supplemental O2 prn, can wean as tolerated  LABA  Follow ECHO, to be read tomorrow  Control anxiety, see 2.   Consider home O2 on discharge.  Consider outpatient pulmonology follow up with PFTs evaluate COPD severity.   Discharge with ANTON/LABA    2. Anxiety  Patient endorses general anxiety, feels it is adding to SoB. Feels she has PTSD from past hospital experiences. May have rebound anxiety post EtOH consumption. Has never been evaluated by psychiatry or been on psychiatric medication.     Continue Seroquel 50mg qhs   Haloperidol prn  Start citalopram on DC  Referral for outpatient  psychiatry follow up    3. CAD  Troponins WNL    Echo read pending  Continue home atorvastatin 80, ASA 81mg      4. Chronic alcohol use  May be exacerbating anxiety, refer to 2.     Folate, thiamine, multivitamin supplementation  Ativan prn for withdrawal symptoms    5. Eosinophilia  Eos 0.8 on 10/28  Resolved on 10/29  Ddx: allergic rhinitis vs. Strongyloides (prevalent in area)    Follow pending Strongyloides Ab   Continue home montelukast and fluticasone    ___________________________________________________________  Laurie Angeles OMS-IV  Sub-intern

## 2023-10-29 NOTE — NURSING
Nurses Note -- 4 Eyes      10/28/2023   9:40 PM      Skin assessed during: Admit      [] No Altered Skin Integrity Present    []Prevention Measures Documented      [x] Yes- Altered Skin Integrity Present or Discovered   [x] LDA Added if Not in Epic (Describe Wound)   [x] New Altered Skin Integrity was Present on Admit and Documented in LDA   [x] Wound Image Taken    Wound Care Consulted? Yes    Attending Nurse:  Hernandez Dominguez RN/Staff Member:   Megan

## 2023-10-29 NOTE — NURSING
Home Oxygen Evaluation    Date Performed: 10/29/2023    1) Patient's Home O2 Sat on room air, while at rest: 93%        If O2 sats on room air at rest are 88% or below, patient qualifies. No additional testing needed. Document N/A in steps 2 and 3. If 89% or above, complete steps 2.      2) Patient's O2 Sat on room air while exercisin%        If O2 sats on room air while exercising remain 89% or above patient does not qualify, no further testing needed Document N/A in step 3. If O2 sats on room air while exercising are 88% or below, continue to step 3.      3) Patient's O2 Sat while exercising on O2: 93% at 2 LPM         (Must show improvement from #2 for patients to qualify)    If O2 sats improve on oxygen, patient qualifies for portable oxygen. If not, the patient does not qualify.

## 2023-10-30 ENCOUNTER — PATIENT MESSAGE (OUTPATIENT)
Dept: PRIMARY CARE CLINIC | Facility: CLINIC | Age: 73
End: 2023-10-30
Payer: MEDICARE

## 2023-10-30 PROBLEM — J96.01 ACUTE HYPOXIC RESPIRATORY FAILURE: Status: ACTIVE | Noted: 2023-10-30

## 2023-10-30 PROBLEM — I49.1 PAC (PREMATURE ATRIAL CONTRACTION): Status: ACTIVE | Noted: 2023-10-30

## 2023-10-30 LAB
ALBUMIN SERPL BCP-MCNC: 3.2 G/DL (ref 3.5–5.2)
ALP SERPL-CCNC: 70 U/L (ref 55–135)
ALT SERPL W/O P-5'-P-CCNC: 22 U/L (ref 10–44)
ANION GAP SERPL CALC-SCNC: 15 MMOL/L (ref 8–16)
ANISOCYTOSIS BLD QL SMEAR: SLIGHT
APTT PPP: 39.2 SEC (ref 21–32)
APTT PPP: 39.4 SEC (ref 21–32)
APTT PPP: 45.7 SEC (ref 21–32)
AST SERPL-CCNC: 36 U/L (ref 10–40)
BASOPHILS # BLD AUTO: 0.08 K/UL (ref 0–0.2)
BASOPHILS NFR BLD: 0.6 % (ref 0–1.9)
BILIRUB SERPL-MCNC: 0.7 MG/DL (ref 0.1–1)
BUN SERPL-MCNC: 13 MG/DL (ref 8–23)
CALCIUM SERPL-MCNC: 9.2 MG/DL (ref 8.7–10.5)
CHLORIDE SERPL-SCNC: 93 MMOL/L (ref 95–110)
CO2 SERPL-SCNC: 28 MMOL/L (ref 23–29)
CREAT SERPL-MCNC: 0.8 MG/DL (ref 0.5–1.4)
DIFFERENTIAL METHOD: ABNORMAL
EOSINOPHIL # BLD AUTO: 0.4 K/UL (ref 0–0.5)
EOSINOPHIL NFR BLD: 2.6 % (ref 0–8)
ERYTHROCYTE [DISTWIDTH] IN BLOOD BY AUTOMATED COUNT: 20.2 % (ref 11.5–14.5)
EST. GFR  (NO RACE VARIABLE): >60 ML/MIN/1.73 M^2
GLUCOSE SERPL-MCNC: 83 MG/DL (ref 70–110)
HCT VFR BLD AUTO: 38 % (ref 37–48.5)
HGB BLD-MCNC: 12.3 G/DL (ref 12–16)
HYPOCHROMIA BLD QL SMEAR: ABNORMAL
IMM GRANULOCYTES # BLD AUTO: 0.14 K/UL (ref 0–0.04)
IMM GRANULOCYTES NFR BLD AUTO: 1 % (ref 0–0.5)
LYMPHOCYTES # BLD AUTO: 2.5 K/UL (ref 1–4.8)
LYMPHOCYTES NFR BLD: 17.4 % (ref 18–48)
MCH RBC QN AUTO: 34.3 PG (ref 27–31)
MCHC RBC AUTO-ENTMCNC: 32.4 G/DL (ref 32–36)
MCV RBC AUTO: 106 FL (ref 82–98)
MONOCYTES # BLD AUTO: 2.8 K/UL (ref 0.3–1)
MONOCYTES NFR BLD: 19.6 % (ref 4–15)
NEUTROPHILS # BLD AUTO: 8.3 K/UL (ref 1.8–7.7)
NEUTROPHILS NFR BLD: 58.8 % (ref 38–73)
NRBC BLD-RTO: 0 /100 WBC
OVALOCYTES BLD QL SMEAR: ABNORMAL
PHOSPHATE SERPL-MCNC: 2.8 MG/DL (ref 2.7–4.5)
PLATELET # BLD AUTO: 341 K/UL (ref 150–450)
PMV BLD AUTO: 10.5 FL (ref 9.2–12.9)
POIKILOCYTOSIS BLD QL SMEAR: SLIGHT
POLYCHROMASIA BLD QL SMEAR: ABNORMAL
POTASSIUM SERPL-SCNC: 3.1 MMOL/L (ref 3.5–5.1)
PROT SERPL-MCNC: 6.5 G/DL (ref 6–8.4)
RBC # BLD AUTO: 3.59 M/UL (ref 4–5.4)
SODIUM SERPL-SCNC: 136 MMOL/L (ref 136–145)
SPHEROCYTES BLD QL SMEAR: ABNORMAL
WBC # BLD AUTO: 14.1 K/UL (ref 3.9–12.7)

## 2023-10-30 PROCEDURE — 36415 COLL VENOUS BLD VENIPUNCTURE: CPT | Mod: HCNC | Performed by: STUDENT IN AN ORGANIZED HEALTH CARE EDUCATION/TRAINING PROGRAM

## 2023-10-30 PROCEDURE — 25000003 PHARM REV CODE 250: Mod: HCNC | Performed by: STUDENT IN AN ORGANIZED HEALTH CARE EDUCATION/TRAINING PROGRAM

## 2023-10-30 PROCEDURE — 96366 THER/PROPH/DIAG IV INF ADDON: CPT

## 2023-10-30 PROCEDURE — 96368 THER/DIAG CONCURRENT INF: CPT

## 2023-10-30 PROCEDURE — 25000242 PHARM REV CODE 250 ALT 637 W/ HCPCS: Mod: HCNC | Performed by: STUDENT IN AN ORGANIZED HEALTH CARE EDUCATION/TRAINING PROGRAM

## 2023-10-30 PROCEDURE — 93005 ELECTROCARDIOGRAM TRACING: CPT | Mod: HCNC

## 2023-10-30 PROCEDURE — 80053 COMPREHEN METABOLIC PANEL: CPT | Mod: HCNC

## 2023-10-30 PROCEDURE — 94640 AIRWAY INHALATION TREATMENT: CPT | Mod: HCNC

## 2023-10-30 PROCEDURE — 25000003 PHARM REV CODE 250: Mod: HCNC

## 2023-10-30 PROCEDURE — 85730 THROMBOPLASTIN TIME PARTIAL: CPT | Mod: 91,HCNC

## 2023-10-30 PROCEDURE — 93010 ELECTROCARDIOGRAM REPORT: CPT | Mod: HCNC,,, | Performed by: INTERNAL MEDICINE

## 2023-10-30 PROCEDURE — 63600175 PHARM REV CODE 636 W HCPCS: Mod: HCNC | Performed by: STUDENT IN AN ORGANIZED HEALTH CARE EDUCATION/TRAINING PROGRAM

## 2023-10-30 PROCEDURE — 36415 COLL VENOUS BLD VENIPUNCTURE: CPT | Mod: HCNC

## 2023-10-30 PROCEDURE — 85025 COMPLETE CBC W/AUTO DIFF WBC: CPT | Mod: HCNC

## 2023-10-30 PROCEDURE — 93010 EKG 12-LEAD: ICD-10-PCS | Mod: HCNC,,, | Performed by: INTERNAL MEDICINE

## 2023-10-30 PROCEDURE — 84100 ASSAY OF PHOSPHORUS: CPT | Mod: HCNC

## 2023-10-30 PROCEDURE — 85730 THROMBOPLASTIN TIME PARTIAL: CPT | Mod: 91,HCNC | Performed by: STUDENT IN AN ORGANIZED HEALTH CARE EDUCATION/TRAINING PROGRAM

## 2023-10-30 PROCEDURE — 85730 THROMBOPLASTIN TIME PARTIAL: CPT | Mod: HCNC | Performed by: STUDENT IN AN ORGANIZED HEALTH CARE EDUCATION/TRAINING PROGRAM

## 2023-10-30 PROCEDURE — 63600175 PHARM REV CODE 636 W HCPCS: Mod: HCNC

## 2023-10-30 PROCEDURE — 21400001 HC TELEMETRY ROOM: Mod: HCNC

## 2023-10-30 RX ORDER — ENOXAPARIN SODIUM 100 MG/ML
40 INJECTION SUBCUTANEOUS EVERY 24 HOURS
Status: DISCONTINUED | OUTPATIENT
Start: 2023-10-30 | End: 2023-10-31 | Stop reason: HOSPADM

## 2023-10-30 RX ORDER — FLUTICASONE FUROATE AND VILANTEROL 100; 25 UG/1; UG/1
1 POWDER RESPIRATORY (INHALATION) DAILY
Status: DISCONTINUED | OUTPATIENT
Start: 2023-10-30 | End: 2023-10-31 | Stop reason: HOSPADM

## 2023-10-30 RX ORDER — POTASSIUM CHLORIDE 20 MEQ/1
40 TABLET, EXTENDED RELEASE ORAL EVERY 4 HOURS
Status: DISPENSED | OUTPATIENT
Start: 2023-10-30 | End: 2023-10-30

## 2023-10-30 RX ADMIN — ALUMINUM HYDROXIDE, MAGNESIUM HYDROXIDE, AND SIMETHICONE 30 ML: 200; 200; 20 SUSPENSION ORAL at 05:10

## 2023-10-30 RX ADMIN — ALLOPURINOL 200 MG: 100 TABLET ORAL at 08:10

## 2023-10-30 RX ADMIN — POTASSIUM CHLORIDE 40 MEQ: 1500 TABLET, EXTENDED RELEASE ORAL at 08:10

## 2023-10-30 RX ADMIN — TIOTROPIUM BROMIDE INHALATION SPRAY 2 PUFF: 3.12 SPRAY, METERED RESPIRATORY (INHALATION) at 01:10

## 2023-10-30 RX ADMIN — ATORVASTATIN CALCIUM 80 MG: 40 TABLET, FILM COATED ORAL at 08:10

## 2023-10-30 RX ADMIN — QUETIAPINE FUMARATE 50 MG: 25 TABLET ORAL at 08:10

## 2023-10-30 RX ADMIN — PANTOPRAZOLE SODIUM 40 MG: 40 TABLET, DELAYED RELEASE ORAL at 08:10

## 2023-10-30 RX ADMIN — ALUMINUM HYDROXIDE, MAGNESIUM HYDROXIDE, AND SIMETHICONE 30 ML: 200; 200; 20 SUSPENSION ORAL at 06:10

## 2023-10-30 RX ADMIN — ASPIRIN 81 MG: 81 TABLET, COATED ORAL at 08:10

## 2023-10-30 RX ADMIN — FLUTICASONE PROPIONATE 50 MCG: 50 SPRAY, METERED NASAL at 08:10

## 2023-10-30 RX ADMIN — AMLODIPINE BESYLATE 5 MG: 5 TABLET ORAL at 08:10

## 2023-10-30 RX ADMIN — FOLIC ACID 1 MG: 1 TABLET ORAL at 08:10

## 2023-10-30 RX ADMIN — THERA TABS 1 TABLET: TAB at 08:10

## 2023-10-30 RX ADMIN — THIAMINE HYDROCHLORIDE 250 MG: 100 INJECTION, SOLUTION INTRAMUSCULAR; INTRAVENOUS at 08:10

## 2023-10-30 RX ADMIN — CARVEDILOL 6.25 MG: 6.25 TABLET, FILM COATED ORAL at 05:10

## 2023-10-30 RX ADMIN — CARVEDILOL 6.25 MG: 6.25 TABLET, FILM COATED ORAL at 08:10

## 2023-10-30 RX ADMIN — FLUTICASONE FUROATE AND VILANTEROL TRIFENATATE 1 PUFF: 100; 25 POWDER RESPIRATORY (INHALATION) at 01:10

## 2023-10-30 RX ADMIN — ENOXAPARIN SODIUM 40 MG: 40 INJECTION SUBCUTANEOUS at 05:10

## 2023-10-30 NOTE — ASSESSMENT & PLAN NOTE
CAD on aspirin and lipitor as well as coreg  Echo done:  Left Ventricle: The left ventricle is normal in size. Normal wall thickness. Normal wall motion. There is normal systolic function with a visually estimated ejection fraction of 60 - 65%. There is normal diastolic function.    Right Ventricle: Mild right ventricular enlargement. Wall thickness is normal. Right ventricle wall motion  is normal. Systolic function is normal.    Aortic Valve: The aortic valve is a unicuspid valve. There is mild aortic valve sclerosis. There is trace aortic regurgitation.    Mitral Valve: There is mild regurgitation.    Pulmonary Artery: There is mild pulmonary hypertension. The estimated pulmonary artery systolic pressure is 40 mmHg.    IVC/SVC: Intermediate venous pressure at 8 mmHg.     PLAN:  - cardio given opinion: will need cardio followup on d/c to assess pulm htn and need for RHC  -Continue asa and lipitor

## 2023-10-30 NOTE — CARE UPDATE
Cardiology Plan of Care Note:     Notified by primary team of suspected new onset atrial fibrillation. No prior history. All EKGs during current hospitalization and telemetry data was reviewed. Baseline artifact present on all tracings but P waves are noted on all 12-leads. Does not appear to be afib but rather sinus rhythm with frequent atrial ectopy. No clear AF noted. No indication for anticoagulation at this time. Additionally she had mildly elevated pulmonary artery systolic pressure estimated by echocardiogram. ePAP was 40 mmHg on that echo. At this time do not think she needs right heart catheterization while inpatient, rather would refer to cardiology outpatient and likely repeat echo in the near future to follow pulmonary pressures.     Plan:   -No AF seen on EKG or telemetry. No indication for anticoagulation or cardioversion  -Refer to cardiology outpatient for follow up of elevated pulmonary artery pressures    Deondre Mensah MD  [facility]  Cardiovascular Disease, PGY-VI

## 2023-10-30 NOTE — PROGRESS NOTES
"[facility] Medicine  Progress Note    Patient Name: Jessica Floyd  MRN: [id number]  Patient Class: OP- Observation   Admission Date: 10/28/2023  Length of Stay: 0 days  Attending Physician: Krysta Mooney MD  Primary Care Provider: Effie Olmedo MD        Subjective:     Principal Problem:SOB (shortness of breath)        HPI:  Ms. Floyd is a 72 yo F with a PMHx of SAH (1999), HTN, HLD, Anxiety, epsitaxis, vasovagal syncope, subclinical hyperthyroidism, carotid artery stent and allergic rhinitis who presents for SOB that started since Wednesday. She describes it is worse upon lying flat and on minimal exertion. Her SOB improves by sitting up. Pt also denotes that she developed a syncopal attack last Wednesday just prior to her shoulder replacement surgery. This is not her first syncopal attack. She says as usual it was preceded by a 'whoosh of nausea' and 'jaw clonus' afterwhich she passed out for a period of 4 minutes. She did not have frothing of her mouth, uprolling of eyes, nor did she lose consciousness. She says her anxiety is really affecting her life.    She says she was diagnosed with "stress induced reflex syncope" in [facility] in 2015 and has about 1 episode per year.     No chest pain, fevers, chills, night sweats, abdominal pain or N/V/D. She does have a remote smoking history where she smoked 1 PPD for 25 years but quit 20 years ago.     No fever, cough with sputum, no seizures, no central cardiac chest pain    At the ER her vitals:    Bp 140/73  spo2 91%  RR 22 HR 95%    Examination just yielded an anxious looking lady   With limited right shoulder mobility  Her lungs were clear  LL: very mild edema          Overview/Hospital Course:  Pt was admitted as a case of SOB for investigation. Pt was given seroquel at night which she helped her sleep and improved her anxiety.  Patient is scheduled for imaging in order to rule out causes of syncope (carotid doppler, US LL, echo). " Patient is slowly weaning off her oxygen requirements.No syncopal attacks during admission. Patient was found to have PACs with regular rhythm as well as pulmonary htn and cardiology input was for outpatient followup via echo to assess need for RHC. Considering psych followup on discharge.      Interval History: nausea and vomitus stopped    Review of Systems   Constitutional:  Negative for activity change and appetite change.   HENT: Negative.     Respiratory:  Positive for chest tightness and shortness of breath. Negative for cough and wheezing.    Cardiovascular:  Positive for palpitations. Negative for chest pain and leg swelling.   Neurological: Negative.    Psychiatric/Behavioral: Negative.       Objective:     Vital Signs (Most Recent):  Temp: 97.4 °F (36.3 °C) (10/30/23 1048)  Pulse: 80 (10/30/23 1048)  Resp: 16 (10/30/23 1048)  BP: 127/65 (10/30/23 1048)  SpO2: (!) 90 % (10/30/23 1048) Vital Signs (24h Range):  Temp:  [97.3 °F (36.3 °C)-98.2 °F (36.8 °C)] 97.4 °F (36.3 °C)  Pulse:  [61-80] 80  Resp:  [16-18] 16  SpO2:  [90 %-96 %] 90 %  BP: (104-134)/(62-76) 127/65     Weight: 54.9 kg (121 lb)  Body mass index is 20.77 kg/m².    Intake/Output Summary (Last 24 hours) at 10/30/2023 1153  Last data filed at 10/30/2023 0907  Gross per 24 hour   Intake 809.52 ml   Output --   Net 809.52 ml         Physical Exam  HENT:      Head: Normocephalic and atraumatic.   Eyes:      Pupils: Pupils are equal, round, and reactive to light.   Cardiovascular:      Heart sounds: Normal heart sounds. No murmur heard.  Pulmonary:      Effort: Respiratory distress (mild) present.      Breath sounds: Normal breath sounds.   Abdominal:      General: Bowel sounds are normal.      Palpations: Abdomen is soft.   Neurological:      General: No focal deficit present.      Mental Status: She is alert and oriented to person, place, and time. Mental status is at baseline.   Psychiatric:         Mood and Affect: Mood normal.         Behavior:  Behavior normal.         Thought Content: Thought content normal.         Judgment: Judgment normal.             Significant Labs: All pertinent labs within the past 24 hours have been reviewed.    Significant Imaging: I have reviewed all pertinent imaging results/findings within the past 24 hours.      Assessment/Plan:      * SOB (shortness of breath)    On exertion and  Upon lying flat  Improved by rest and bending forward  Chest is clear on exam  Minimal edema LL b/L , but patient is on norvasc  CTA of chest showed no PE    Plan:  Keep patient on supplemental oxygen target spo2 >94%  Echo  CXR  halodol 3mg IV PRN  seroquel daily HS  lexapro to be added      PAC (premature atrial contraction)  Assessed by cardiology team  Cardio opinion: No AF seen on EKG or telemetry. No indication for anticoagulation or cardioversion    Plan:  Follow cardiology recs    Severe anxiety  Patient admitted to using alcohol to alleviate anxiety lately.  She has abruptly stopped it        Eosinophilia  Strongyloides workup pending        Hypertension  Continue home medications:   norvasc 5mg and hydrochlorothiazide 25mg po daily    Coronary artery disease involving native coronary artery of native heart without angina pectoris  CAD on aspirin and lipitor as well as coreg  Echo done:  Left Ventricle: The left ventricle is normal in size. Normal wall thickness. Normal wall motion. There is normal systolic function with a visually estimated ejection fraction of 60 - 65%. There is normal diastolic function.    Right Ventricle: Mild right ventricular enlargement. Wall thickness is normal. Right ventricle wall motion  is normal. Systolic function is normal.    Aortic Valve: The aortic valve is a unicuspid valve. There is mild aortic valve sclerosis. There is trace aortic regurgitation.    Mitral Valve: There is mild regurgitation.    Pulmonary Artery: There is mild pulmonary hypertension. The estimated pulmonary artery systolic  pressure is 40 mmHg.    IVC/SVC: Intermediate venous pressure at 8 mmHg.     PLAN:  - cardio given opinion: will need cardio followup on d/c to assess pulm htn and need for RHC  -Continue asa and lipitor          VTE Risk Mitigation (From admission, onward)         Ordered     heparin 25,000 units in dextrose 5% (100 units/ml) IV bolus from bag - ADDITIONAL PRN BOLUS - 60 units/kg  As needed (PRN)        Question:  Heparin Infusion Adjustment (DO NOT MODIFY ANSWER)  Answer:  \\[email or web address]\epic\Images\Pharmacy\HeparinInfusions\heparin LOW INTENSITY nomogram for [facility] [id number]    10/29/23 1715     heparin 25,000 units in dextrose 5% (100 units/ml) IV bolus from bag - ADDITIONAL PRN BOLUS - 30 units/kg  As needed (PRN)        Question:  Heparin Infusion Adjustment (DO NOT MODIFY ANSWER)  Answer:  \[email or web address]\epic\Images\Pharmacy\HeparinInfusions\heparin LOW INTENSITY nomogram for [facility] [id number].pdf    10/29/23 1715     heparin 25,000 units in dextrose 5% 250 mL (100 units/mL) infusion LOW INTENSITY nomogram - [facility]  Continuous        Question:  Begin at (units/kg/hr)  Answer:  12    10/29/23 1715     IP VTE HIGH RISK PATIENT  Once         10/28/23 1534     Place sequential compression device  Until discontinued         10/28/23 1534                Discharge Planning   DEBBIE: 10/30/2023     Code Status: Full Code   Is the patient medically ready for discharge?: No    Reason for patient still in hospital (select all that apply): Treatment  Discharge Plan A: Home, Home with family, Home Health   Discharge Delays: None known at this time              Mari Diaz MD  Department of Hospital Medicine   [facility] S[facility]

## 2023-10-30 NOTE — NURSING
Zofran given pt c/o nausea and now chest pain sat are 90% on 2L. Messaged on call he is coming to see patient.

## 2023-10-30 NOTE — PLAN OF CARE
Spencer jonathan [facility]  Discharge Reassessment    Primary Care Provider: Effie Olmedo MD    Expected Discharge Date: 10/30/2023    SW met with pt and family to review discharge recommendation.  Pt and family interested in patient receiving HH services again.  Pt and family agreeable to HH services.  Family was notified referral for OPCM was placed.  Pt may need home O2.      Patient/family provided list of facilities in-network with patient's payor plan. Providers that are owned, operated, or affiliated with [facility] are included on the list.     Notified that referral sent to below listed facilities from in-network list based on proximity to home/family support:   [facility] (has used in the past)    Patient/family instructed to identify preference.    Preferred Facility: (if more than 1, listed in order of descending preference)  [facility] (has used in the past)    If an additional preferred facility not listed above is identified, additional referral to be sent. If above facilities unable to accept, will send additional referrals to in-network providers.     Discharge Plan A and Plan B have been determined by review of patient's clinical status, future medical and therapeutic needs, and coverage/benefits for post-acute care in coordination with multidisciplinary team members.    Reassessment (most recent)       Discharge Reassessment - 10/30/23 1055          Discharge Reassessment    Assessment Type Discharge Planning Reassessment     Did the patient's condition or plan change since previous assessment? No     Discharge Plan discussed with: Patient     Communicated DEBBIE with patient/caregiver Yes     Discharge Plan A Home;Home with family;Home Health     Discharge Plan B Home;Home with family     DME Needed Upon Discharge  oxygen     Transition of Care Barriers None     Why the patient remains in the hospital Requires continued medical care        Post-Acute Status    Post-Acute  Authorization Home Health     Home Health Status Referrals Sent     Discharge Delays None known at this time                   Ericka Riggs LMSW  PRN-  [facility]  Ext. [phone]

## 2023-10-30 NOTE — SUBJECTIVE & OBJECTIVE
Interval History: nausea and vomitus stopped    Review of Systems   Constitutional:  Negative for activity change and appetite change.   HENT: Negative.     Respiratory:  Positive for chest tightness and shortness of breath. Negative for cough and wheezing.    Cardiovascular:  Positive for palpitations. Negative for chest pain and leg swelling.   Neurological: Negative.    Psychiatric/Behavioral: Negative.       Objective:     Vital Signs (Most Recent):  Temp: 97.4 °F (36.3 °C) (10/30/23 1048)  Pulse: 80 (10/30/23 1048)  Resp: 16 (10/30/23 1048)  BP: 127/65 (10/30/23 1048)  SpO2: (!) 90 % (10/30/23 1048) Vital Signs (24h Range):  Temp:  [97.3 °F (36.3 °C)-98.2 °F (36.8 °C)] 97.4 °F (36.3 °C)  Pulse:  [61-80] 80  Resp:  [16-18] 16  SpO2:  [90 %-96 %] 90 %  BP: (104-134)/(62-76) 127/65     Weight: 54.9 kg (121 lb)  Body mass index is 20.77 kg/m².    Intake/Output Summary (Last 24 hours) at 10/30/2023 1153  Last data filed at 10/30/2023 0907  Gross per 24 hour   Intake 809.52 ml   Output --   Net 809.52 ml         Physical Exam  HENT:      Head: Normocephalic and atraumatic.   Eyes:      Pupils: Pupils are equal, round, and reactive to light.   Cardiovascular:      Heart sounds: Normal heart sounds. No murmur heard.  Pulmonary:      Effort: Respiratory distress (mild) present.      Breath sounds: Normal breath sounds.   Abdominal:      General: Bowel sounds are normal.      Palpations: Abdomen is soft.   Neurological:      General: No focal deficit present.      Mental Status: She is alert and oriented to person, place, and time. Mental status is at baseline.   Psychiatric:         Mood and Affect: Mood normal.         Behavior: Behavior normal.         Thought Content: Thought content normal.         Judgment: Judgment normal.             Significant Labs: All pertinent labs within the past 24 hours have been reviewed.    Significant Imaging: I have reviewed all pertinent imaging results/findings within the past 24  hours.

## 2023-10-30 NOTE — NURSING
Patient's oxygen saturation is 90% on RA. Oxygen removed and patient educated on criteria for removal and placement of oxygen. Patient and family verbalize understanding.

## 2023-10-30 NOTE — MEDICAL/APP STUDENT
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 Pt was scheduled for R shoulder replacement on 10/25, did not occur because she passed out for 3 minutes prior during pre-op evaluation. Felt wave of nausea pass over her prior to syncope. Since then, endorses worsening SoB.         Interval Hx: Yesterday patient was trialed on lasix to see if exercising O2sats would improve. Last night patient had episode of new chest pain and nausea. Chest pain relieved by vomiting. Trops ordered.  EKG/Tele concerning for possible new onset atrial fibrillation. Pt was subsequently started on low dose heparin gtt.    This morning patient reports no chest pain or nausea. States shortness of breath has improved, she is able to breath comfortably without nc in place. Endorses good appetite. Feels less anxious then in previous days.         Review of Systems   Constitutional:  Negative for diaphoresis.   Respiratory:  Positive for shortness of breath (improved). Negative for cough and wheezing.    Cardiovascular:  Positive for orthopnea. Negative for chest pain, palpitations and leg swelling.   Gastrointestinal:  Negative for abdominal pain, nausea and vomiting.   Neurological:  Negative for dizziness.   Psychiatric/Behavioral:  The patient is nervous/anxious (improved).      Current Medications  Scheduled Meds:   allopurinoL  200 mg Oral Daily    amLODIPine  5 mg Oral Daily    aspirin  81 mg Oral Daily    atorvastatin  80 mg Oral Daily    carvediloL  6.25 mg Oral BID WM    fluticasone furoate-vilanteroL  1 puff Inhalation Daily    fluticasone propionate  1 spray Each " Nostril Daily    folic acid  1 mg Oral Daily    multivitamin  1 tablet Oral Daily    pantoprazole  40 mg Oral Daily    potassium chloride  40 mEq Oral Q4H    QUEtiapine  50 mg Oral QHS    thiamine (B-1) 250 mg in dextrose 5 % (D5W) 100 mL IVPB  250 mg Intravenous Daily    Followed by    [START ON 11/1/2023] thiamine  100 mg Oral TID    tiotropium bromide  2 puff Inhalation Daily     Continuous Infusions:   heparin (porcine) in D5W 12 Units/kg/hr (10/29/23 1841)     PRN Meds:.acetaminophen, aluminum-magnesium hydroxide-simethicone, dextrose 10%, glucagon (human recombinant), glucose, heparin (PORCINE), heparin (PORCINE), melatonin, naloxone, sodium chloride 0.9%                OBJECTIVE  Vitals  Vitals:    10/30/23 1048   BP: 127/65   Pulse: 80   Resp: 16   Temp: 97.4 °F (36.3 °C)         Physical Exam  Constitutional:       General: She is not in acute distress.     Appearance: Normal appearance. She is normal weight.   HENT:      Head: Normocephalic and atraumatic.      Mouth/Throat:      Mouth: Mucous membranes are moist.      Pharynx: Oropharynx is clear.   Eyes:      Extraocular Movements: Extraocular movements intact.      Conjunctiva/sclera: Conjunctivae normal.   Cardiovascular:      Rate and Rhythm: Normal rate. Rhythm irregular.      Heart sounds: Normal heart sounds. No murmur heard.     No friction rub. No gallop.   Pulmonary:      Effort: Pulmonary effort is normal. No respiratory distress.      Breath sounds: Normal breath sounds. No wheezing or rales.   Abdominal:      General: Abdomen is flat. There is no distension.      Palpations: Abdomen is soft.      Tenderness: There is no abdominal tenderness.   Musculoskeletal:      Cervical back: Neck supple.      Right lower leg: No edema.      Left lower leg: No edema.   Skin:     General: Skin is warm and dry.   Neurological:      Mental Status: She is alert.             Labs    Lab Results   Component Value Date     10/30/2023    K 3.1 (L)  10/30/2023    CL 93 (L) 10/30/2023    CO2 28 10/30/2023    BUN 13 10/30/2023    CREATININE 0.8 10/30/2023    CALCIUM 9.2 10/30/2023    ANIONGAP 15 10/30/2023    EGFRNORACEVR >60.0 10/30/2023       Lab Results   Component Value Date    WBC 14.10 (H) 10/30/2023    HGB 12.3 10/30/2023    HCT 38.0 10/30/2023     (H) 10/30/2023     10/30/2023     Lab Results   Component Value Date    WBC 14.10 (H) 10/30/2023    RBC 3.59 (L) 10/30/2023    HGB 12.3 10/30/2023    HCT 38.0 10/30/2023     (H) 10/30/2023    MCH 34.3 (H) 10/30/2023    MCHC 32.4 10/30/2023    RDW 20.2 (H) 10/30/2023     10/30/2023    MPV 10.5 10/30/2023    GRAN 8.3 (H) 10/30/2023    GRAN 58.8 10/30/2023    LYMPH 2.5 10/30/2023    LYMPH 17.4 (L) 10/30/2023    MONO 2.8 (H) 10/30/2023    MONO 19.6 (H) 10/30/2023    EOS 0.4 10/30/2023    BASO 0.08 10/30/2023    EOSINOPHIL 2.6 10/30/2023    BASOPHIL 0.6 10/30/2023        Recent Labs   Lab 10/29/23  1740 10/30/23  0038 10/30/23  0630   INR 1.0  --   --    APTT 26.3   < > 39.4*    < > = values in this interval not displayed.       Recent Labs   Lab 10/29/23  1949   TROPONINI 0.019         Imaging  Results for orders placed during the hospital encounter of 10/28/23    Echo    Interpretation Summary    Left Ventricle: The left ventricle is normal in size. Normal wall thickness. Normal wall motion. There is normal systolic function with a visually estimated ejection fraction of 60 - 65%. There is normal diastolic function.    Right Ventricle: Mild right ventricular enlargement. Wall thickness is normal. Right ventricle wall motion  is normal. Systolic function is normal.    Aortic Valve: The aortic valve is a unicuspid valve. There is mild aortic valve sclerosis. There is trace aortic regurgitation.    Mitral Valve: There is mild regurgitation.    Pulmonary Artery: There is mild pulmonary hypertension. The estimated pulmonary artery systolic pressure is 40 mmHg.    IVC/SVC: Intermediate venous  pressure at 8 mmHg.      Consults  - cardiology: need for cardioversion given new onset atrial fibrillation? pulmonary hypertension workup?    ASSESSMENT & PLAN    Shortness of Breath - Ddx:   Atrial fibrillation - could be cause of SOB and new onset chest pain with nausea. Telemetry/EG showing possible atrial fibrillation, CHADS vasc of 5, so heparin gtt was initiated.  Trops wnl. Cardiology was consulted and determined P-wave morphology to be present, so less likely A-fib. They determined irregularity to be result of frequent PACs. Consider other differentials.    - stop heparin GTT   - continue carvedilol   - repeat EKG/troponin if symptoms return    - continue telemetry          COPD -  emphysematous changes on CXR. No PFTs on file. Never formally diagnosed.      Pulmonary HTN     Anxiety     2.    3.    4.      Discharge Considerations:            ___________________________________________________________  [employer]  Visiting Student Elective

## 2023-10-30 NOTE — ASSESSMENT & PLAN NOTE
Assessed by cardiology team  Cardio opinion: No AF seen on EKG or telemetry. No indication for anticoagulation or cardioversion    Plan:  Follow cardiology recs

## 2023-10-30 NOTE — NURSING
Oxygen titrated to 1.5L and patient was given an incentive spirometer. Patient and family verbalize understanding on how to use it and demonstrated effectively.

## 2023-10-31 VITALS
TEMPERATURE: 98 F | WEIGHT: 121 LBS | DIASTOLIC BLOOD PRESSURE: 63 MMHG | BODY MASS INDEX: 20.66 KG/M2 | RESPIRATION RATE: 18 BRPM | HEIGHT: 64 IN | SYSTOLIC BLOOD PRESSURE: 127 MMHG | HEART RATE: 73 BPM | OXYGEN SATURATION: 89 %

## 2023-10-31 LAB
ALBUMIN SERPL BCP-MCNC: 3.3 G/DL (ref 3.5–5.2)
ALLENS TEST: ABNORMAL
ALP SERPL-CCNC: 73 U/L (ref 55–135)
ALT SERPL W/O P-5'-P-CCNC: 23 U/L (ref 10–44)
ANION GAP SERPL CALC-SCNC: 13 MMOL/L (ref 8–16)
ANION GAP SERPL CALC-SCNC: 16 MMOL/L (ref 8–16)
ANISOCYTOSIS BLD QL SMEAR: SLIGHT
AST SERPL-CCNC: 31 U/L (ref 10–40)
BASOPHILS # BLD AUTO: 0.14 K/UL (ref 0–0.2)
BASOPHILS NFR BLD: 1.4 % (ref 0–1.9)
BILIRUB SERPL-MCNC: 0.8 MG/DL (ref 0.1–1)
BUN SERPL-MCNC: 15 MG/DL (ref 8–23)
BUN SERPL-MCNC: 18 MG/DL (ref 8–23)
CALCIUM SERPL-MCNC: 9.3 MG/DL (ref 8.7–10.5)
CALCIUM SERPL-MCNC: 9.4 MG/DL (ref 8.7–10.5)
CHLORIDE SERPL-SCNC: 91 MMOL/L (ref 95–110)
CHLORIDE SERPL-SCNC: 92 MMOL/L (ref 95–110)
CO2 SERPL-SCNC: 26 MMOL/L (ref 23–29)
CO2 SERPL-SCNC: 28 MMOL/L (ref 23–29)
CREAT SERPL-MCNC: 1 MG/DL (ref 0.5–1.4)
CREAT SERPL-MCNC: 1 MG/DL (ref 0.5–1.4)
DACRYOCYTES BLD QL SMEAR: ABNORMAL
DELSYS: ABNORMAL
DIFFERENTIAL METHOD: ABNORMAL
EOSINOPHIL # BLD AUTO: 0.7 K/UL (ref 0–0.5)
EOSINOPHIL NFR BLD: 7.5 % (ref 0–8)
ERYTHROCYTE [DISTWIDTH] IN BLOOD BY AUTOMATED COUNT: 20.7 % (ref 11.5–14.5)
EST. GFR  (NO RACE VARIABLE): 59.1 ML/MIN/1.73 M^2
EST. GFR  (NO RACE VARIABLE): 59.1 ML/MIN/1.73 M^2
GLUCOSE SERPL-MCNC: 118 MG/DL (ref 70–110)
GLUCOSE SERPL-MCNC: 82 MG/DL (ref 70–110)
HCO3 UR-SCNC: 32.7 MMOL/L (ref 24–28)
HCT VFR BLD AUTO: 41 % (ref 37–48.5)
HCT VFR BLD CALC: 40 %PCV (ref 36–54)
HGB BLD-MCNC: 13.2 G/DL (ref 12–16)
IMM GRANULOCYTES # BLD AUTO: 0.09 K/UL (ref 0–0.04)
IMM GRANULOCYTES NFR BLD AUTO: 0.9 % (ref 0–0.5)
LYMPHOCYTES # BLD AUTO: 2 K/UL (ref 1–4.8)
LYMPHOCYTES NFR BLD: 20.3 % (ref 18–48)
MCH RBC QN AUTO: 34.2 PG (ref 27–31)
MCHC RBC AUTO-ENTMCNC: 32.2 G/DL (ref 32–36)
MCV RBC AUTO: 106 FL (ref 82–98)
MODE: ABNORMAL
MONOCYTES # BLD AUTO: 2.1 K/UL (ref 0.3–1)
MONOCYTES NFR BLD: 20.9 % (ref 4–15)
NEUTROPHILS # BLD AUTO: 4.9 K/UL (ref 1.8–7.7)
NEUTROPHILS NFR BLD: 49 % (ref 38–73)
NRBC BLD-RTO: 0 /100 WBC
OVALOCYTES BLD QL SMEAR: ABNORMAL
PCO2 BLDA: 33.6 MMHG (ref 35–45)
PH SMN: 7.59 [PH] (ref 7.35–7.45)
PHOSPHATE SERPL-MCNC: 3 MG/DL (ref 2.7–4.5)
PLATELET # BLD AUTO: 375 K/UL (ref 150–450)
PLATELET BLD QL SMEAR: ABNORMAL
PMV BLD AUTO: 10.3 FL (ref 9.2–12.9)
PO2 BLDA: 61 MMHG (ref 80–100)
POC BE: 11 MMOL/L
POC IONIZED CALCIUM: 1.15 MMOL/L (ref 1.06–1.42)
POC SATURATED O2: 95 % (ref 95–100)
POC TCO2: 34 MMOL/L (ref 23–27)
POIKILOCYTOSIS BLD QL SMEAR: SLIGHT
POTASSIUM BLD-SCNC: 4.1 MMOL/L (ref 3.5–5.1)
POTASSIUM SERPL-SCNC: 2.9 MMOL/L (ref 3.5–5.1)
POTASSIUM SERPL-SCNC: 3.9 MMOL/L (ref 3.5–5.1)
PROT SERPL-MCNC: 6.9 G/DL (ref 6–8.4)
RBC # BLD AUTO: 3.86 M/UL (ref 4–5.4)
SAMPLE: ABNORMAL
SITE: ABNORMAL
SODIUM BLD-SCNC: 130 MMOL/L (ref 136–145)
SODIUM SERPL-SCNC: 133 MMOL/L (ref 136–145)
SODIUM SERPL-SCNC: 133 MMOL/L (ref 136–145)
SPHEROCYTES BLD QL SMEAR: ABNORMAL
WBC # BLD AUTO: 9.91 K/UL (ref 3.9–12.7)

## 2023-10-31 PROCEDURE — 84132 ASSAY OF SERUM POTASSIUM: CPT | Mod: HCNC

## 2023-10-31 PROCEDURE — 85014 HEMATOCRIT: CPT | Mod: HCNC

## 2023-10-31 PROCEDURE — 80053 COMPREHEN METABOLIC PANEL: CPT | Mod: HCNC

## 2023-10-31 PROCEDURE — 82800 BLOOD PH: CPT | Mod: HCNC

## 2023-10-31 PROCEDURE — 85025 COMPLETE CBC W/AUTO DIFF WBC: CPT | Mod: HCNC

## 2023-10-31 PROCEDURE — 25000003 PHARM REV CODE 250: Mod: HCNC

## 2023-10-31 PROCEDURE — 36600 WITHDRAWAL OF ARTERIAL BLOOD: CPT | Mod: HCNC

## 2023-10-31 PROCEDURE — 99900035 HC TECH TIME PER 15 MIN (STAT): Mod: HCNC

## 2023-10-31 PROCEDURE — 80048 BASIC METABOLIC PNL TOTAL CA: CPT | Mod: HCNC,XB

## 2023-10-31 PROCEDURE — 84295 ASSAY OF SERUM SODIUM: CPT | Mod: HCNC

## 2023-10-31 PROCEDURE — 82803 BLOOD GASES ANY COMBINATION: CPT | Mod: HCNC

## 2023-10-31 PROCEDURE — 94640 AIRWAY INHALATION TREATMENT: CPT | Mod: HCNC

## 2023-10-31 PROCEDURE — 82330 ASSAY OF CALCIUM: CPT | Mod: HCNC

## 2023-10-31 PROCEDURE — 27000221 HC OXYGEN, UP TO 24 HOURS: Mod: HCNC

## 2023-10-31 PROCEDURE — 84100 ASSAY OF PHOSPHORUS: CPT | Mod: HCNC

## 2023-10-31 PROCEDURE — 36415 COLL VENOUS BLD VENIPUNCTURE: CPT | Mod: HCNC

## 2023-10-31 PROCEDURE — 94761 N-INVAS EAR/PLS OXIMETRY MLT: CPT | Mod: HCNC

## 2023-10-31 PROCEDURE — 25000003 PHARM REV CODE 250: Mod: HCNC | Performed by: STUDENT IN AN ORGANIZED HEALTH CARE EDUCATION/TRAINING PROGRAM

## 2023-10-31 RX ORDER — CELECOXIB 200 MG/1
200 CAPSULE ORAL
Start: 2023-10-31 | End: 2024-03-28 | Stop reason: SDUPTHER

## 2023-10-31 RX ORDER — COLCHICINE 0.6 MG/1
0.6 TABLET ORAL DAILY
Qty: 30 TABLET | Refills: 11
Start: 2023-10-31 | End: 2024-01-17 | Stop reason: SDUPTHER

## 2023-10-31 RX ORDER — QUETIAPINE FUMARATE 50 MG/1
50 TABLET, FILM COATED ORAL NIGHTLY
Qty: 30 TABLET | Refills: 11 | Status: ON HOLD | OUTPATIENT
Start: 2023-10-31 | End: 2023-11-19 | Stop reason: SDUPTHER

## 2023-10-31 RX ORDER — FOLIC ACID 1 MG/1
1 TABLET ORAL DAILY
Qty: 90 TABLET | Refills: 3 | Status: SHIPPED | OUTPATIENT
Start: 2023-11-01 | End: 2024-10-31

## 2023-10-31 RX ORDER — FLUTICASONE FUROATE AND VILANTEROL 100; 25 UG/1; UG/1
1 POWDER RESPIRATORY (INHALATION) DAILY
Qty: 60 EACH | Refills: 11 | Status: SHIPPED | OUTPATIENT
Start: 2023-11-01 | End: 2023-11-27 | Stop reason: SDUPTHER

## 2023-10-31 RX ADMIN — ALLOPURINOL 200 MG: 100 TABLET ORAL at 10:10

## 2023-10-31 RX ADMIN — POTASSIUM BICARBONATE 40 MEQ: 391 TABLET, EFFERVESCENT ORAL at 10:10

## 2023-10-31 RX ADMIN — ATORVASTATIN CALCIUM 80 MG: 40 TABLET, FILM COATED ORAL at 10:10

## 2023-10-31 RX ADMIN — FOLIC ACID 1 MG: 1 TABLET ORAL at 10:10

## 2023-10-31 RX ADMIN — CARVEDILOL 6.25 MG: 6.25 TABLET, FILM COATED ORAL at 10:10

## 2023-10-31 RX ADMIN — AMLODIPINE BESYLATE 5 MG: 5 TABLET ORAL at 10:10

## 2023-10-31 RX ADMIN — TIOTROPIUM BROMIDE INHALATION SPRAY 2 PUFF: 3.12 SPRAY, METERED RESPIRATORY (INHALATION) at 09:10

## 2023-10-31 RX ADMIN — THERA TABS 1 TABLET: TAB at 10:10

## 2023-10-31 RX ADMIN — PANTOPRAZOLE SODIUM 40 MG: 40 TABLET, DELAYED RELEASE ORAL at 10:10

## 2023-10-31 RX ADMIN — FLUTICASONE FUROATE AND VILANTEROL TRIFENATATE 1 PUFF: 100; 25 POWDER RESPIRATORY (INHALATION) at 09:10

## 2023-10-31 RX ADMIN — FLUTICASONE PROPIONATE 50 MCG: 50 SPRAY, METERED NASAL at 10:10

## 2023-10-31 RX ADMIN — ASPIRIN 81 MG: 81 TABLET, COATED ORAL at 10:10

## 2023-10-31 NOTE — NURSING
Home Oxygen Evaluation    Date Performed: 10/31/2023    1) Patient's Home O2 Sat on room air, while at rest: 84        If O2 sats on room air at rest are 88% or below, patient qualifies. No additional testing needed. Document N/A in steps 2 and 3. If 89% or above, complete steps 2.      2) Patient's O2 Sat on room air while exercising: na         If O2 sats on room air while exercising remain 89% or above patient does not qualify, no further testing needed Document N/A in step 3. If O2 sats on room air while exercising are 88% or below, continue to step 3.      3) Patient's O2 Sat while exercising on O2: na at na LPM         (Must show improvement from #2 for patients to qualify)    If O2 sats improve on oxygen, patient qualifies for portable oxygen. If not, the patient does not qualify.

## 2023-10-31 NOTE — PROGRESS NOTES
10/31/23 0630   WOCN Assessment   WOCN Total Time (mins) 20   Visit Date 10/31/23   Visit Time 0630   Consult Type New   WOCN Speciality Wound   Intervention assessed;changed;applied;chart review;coordination of care;orders        Altered Skin Integrity 10/28/23 2223 medial Coccyx   Date First Assessed/Time First Assessed: 10/28/23 2223   Altered Skin Integrity Present on Admission - Did Patient arrive to the hospital with altered skin?: yes  Orientation: medial  Location: Coccyx  Is this injury device related?: No   Wound Image    Description of Altered Skin Integrity Partial thickness tissue loss. Shallow open ulcer with a red or pink wound bed, without slough. Intact or Open/Ruptured Serum-filled blister.   Dressing Appearance Clean;Dry   Drainage Amount None   Red (%), Wound Tissue Color 100 %   Periwound Area Redness   Wound Edges Defined;Irregular   Wound Length (cm) 3 cm   Wound Width (cm) 2.5 cm   Wound Depth (cm) 0.1 cm   Wound Volume (cm^3) 0.75 cm^3   Wound Surface Area (cm^2) 7.5 cm^2     [facility]  Wound Care    Patient Name:  Jessica Floyd   MRN:  [id number]  Date: 10/31/2023  Diagnosis: SOB (shortness of breath)    History:     Past Medical History:   Diagnosis Date    Allergic rhinitis     Amblyopia     Carotid stenosis     Coronary atherosclerosis     Outside [facility] 6/2014: 20% mLAD, 50% mCx, 20%, mRCA    Dyslipidemia     ESBL (extended spectrum beta-lactamase) producing bacteria infection     UTI    Hypertension     Nausea and vomiting 05/26/2017    SAH (subarachnoid hemorrhage) 08/24/2016 1999, s/p clipping    Subarachnoid hemorrhage due to ruptured aneurysm 1999       Social History     Socioeconomic History    Marital status:    Occupational History    Occupation: Retired   Tobacco Use    Smoking status: Former     Current packs/day: 0.00     Average packs/day: 0.5 packs/day for 20.0 years (10.0 ttl pk-yrs)     Types: Cigarettes     Start date: 1/1/1979     Quit date: 1/1/1999      Years since quittin.8     Passive exposure: Past    Smokeless tobacco: Never   Substance and Sexual Activity    Alcohol use: Yes     Alcohol/week: 7.0 standard drinks of alcohol     Types: 7 Glasses of wine per week     Comment: One glass of wine prior to dinner    Drug use: No    Sexual activity: Yes     Partners: Male   Social History Narrative    .  Has 3 grown daughters.       Social Determinants of Health     Financial Resource Strain: Low Risk  (10/28/2023)    Overall Financial Resource Strain (CARDIA)     Difficulty of Paying Living Expenses: Not hard at all   Food Insecurity: No Food Insecurity (10/28/2023)    Hunger Vital Sign     Worried About Running Out of Food in the Last Year: Never true     Ran Out of Food in the Last Year: Never true   Transportation Needs: No Transportation Needs (10/28/2023)    PRAPARE - Transportation     Lack of Transportation (Medical): No     Lack of Transportation (Non-Medical): No   Physical Activity: Sufficiently Active (10/28/2023)    Exercise Vital Sign     Days of Exercise per Week: 7 days     Minutes of Exercise per Session: 30 min   Stress: Stress Concern Present (10/28/2023)    [employer] [place] [employer] - Occupational Stress Questionnaire     Feeling of Stress : To some extent   Social Connections: Moderately Integrated (10/28/2023)    Social Connection and Isolation Panel [NHANES]     Frequency of Communication with Friends and Family: More than three times a week     Frequency of Social Gatherings with Friends and Family: More than three times a week     Attends Scientologist Services: More than 4 times per year     Active Member of Clubs or Organizations: No     Attends Club or Organization Meetings: Never     Marital Status:    Housing Stability: Low Risk  (10/28/2023)    Housing Stability Vital Sign     Unable to Pay for Housing in the Last Year: No     Number of Places Lived in the Last Year: 1     Unstable Housing in the Last  Year: No       Precautions:     Allergies as of 10/28/2023    (No Known Allergies)       [facility] Assessment Details/Treatment   Patient consult for wound care to sacrum area, that has partial skin opening without any drainage. The patient will have a waffle mattress ordered. The patient is able to reposition herself while in bed. The treatment to the sacral area to cleanse with soap and water pat dry apply Calmoseptine ointment then cover with a foam sacral border every two days and prn for saturation of stool or urine.    Recommendations made to primary team for  the above  Orders placed.     10/31/2023

## 2023-10-31 NOTE — DISCHARGE SUMMARY
"[facility] Medicine  Discharge Summary      Patient Name: Jessica Floyd  MRN: [id number]  LENORA: [id number]  Patient Class: IP- Inpatient  Admission Date: 10/28/2023  Hospital Length of Stay: 1 days  Discharge Date and Time:  10/31/2023 11:12 AM  Attending Physician: Krysta Mooney MD   Discharging Provider: Adan Cabrera MD  Primary Care Provider: Effie Olmedo MD  [facility] Medicine Team: [place] [id number] Adan Cabrera MD  Primary Care Team: [place] [id number]    HPI:   Ms. Floyd is a 74 yo F with a PMHx of SAH (1999), HTN, HLD, Anxiety, epsitaxis, vasovagal syncope, subclinical hyperthyroidism, carotid artery stent and allergic rhinitis who presents for SOB that started since Wednesday. She describes it is worse upon lying flat and on minimal exertion. Her SOB improves by sitting up. Pt also denotes that she developed a syncopal attack last Wednesday just prior to her shoulder replacement surgery. This is not her first syncopal attack. She says as usual it was preceded by a 'whoosh of nausea' and 'jaw clonus' afterwhich she passed out for a period of 4 minutes. She did not have frothing of her mouth, uprolling of eyes, nor did she lose consciousness. She says her anxiety is really affecting her life.    She says she was diagnosed with "stress induced reflex syncope" in [facility] in 2015 and has about 1 episode per year.     No chest pain, fevers, chills, night sweats, abdominal pain or N/V/D. She does have a remote smoking history where she smoked 1 PPD for 25 years but quit 20 years ago.     No fever, cough with sputum, no seizures, no central cardiac chest pain    At the ER her vitals:    Bp 140/73  spo2 91%  RR 22 HR 95%    Examination just yielded an anxious looking lady   With limited right shoulder mobility  Her lungs were clear  LL: very mild edema          * No surgery found *      Hospital Course:   Pt was admitted as a case of SOB for investigation. Pt was given seroquel at night which " she helped her sleep and improved her anxiety.  Patient is scheduled for imaging in order to rule out causes of syncope (carotid doppler, US LL, echo). Patient is slowly weaning off her oxygen requirements.No syncopal attacks during admission. Patient was found to have PACs with regular rhythm as well as pulmonary htn and cardiology input was for outpatient followup via echo to assess need for RHC. Considering psych followup on discharge. Patient was discharged with instructions to follow up with cardiology, psychiatry, and pulmonology.        Goals of Care Treatment Preferences:  Code Status: Full Code      Consults:   Consults (From admission, onward)        Status Ordering Provider     IP consult to case management  Once        Provider:  (Not yet assigned)    GINO Shelton          No new Assessment & Plan notes have been filed under this hospital service since the last note was generated.  Service: Hospital Medicine    Final Active Diagnoses:    Diagnosis Date Noted POA    PRINCIPAL PROBLEM:  SOB (shortness of breath) [R06.02] 10/28/2023 Yes    PAC (premature atrial contraction) [I49.1] 10/30/2023 Unknown    Acute hypoxic respiratory failure [J96.01] 10/30/2023 Yes    Eosinophilia [D72.10] 10/28/2023 Yes    Severe anxiety [F41.9] 10/28/2023 Yes    Hypertension [I10]  Yes    Coronary artery disease involving native coronary artery of native heart without angina pectoris [I25.10] 08/24/2016 Yes      Problems Resolved During this Admission:       Discharged Condition: stable    Disposition: Home or Self Care    Follow Up:    Patient Instructions:      Ambulatory referral/consult to Outpatient Case Management   Referral Priority: Routine Referral Type: Consultation   Referral Reason: Specialty Services Required   Number of Visits Requested: 1     Ambulatory referral/consult to Cardiology   Standing Status: Future   Referral Priority: Routine Referral Type: Consultation   Referral Reason:  Specialty Services Required   Requested Specialty: Cardiology   Number of Visits Requested: 1     Ambulatory referral/consult to Pulmonology   Standing Status: Future   Referral Priority: Routine Referral Type: Consultation   Referral Reason: Specialty Services Required   Requested Specialty: Pulmonary Disease   Number of Visits Requested: 1     Ambulatory referral/consult to Psychiatry   Standing Status: Future   Referral Priority: Urgent Referral Type: Psychiatric   Referral Reason: Specialty Services Required   Requested Specialty: Psychiatry   Number of Visits Requested: 1     Ambulatory referral/consult to Internal Medicine   Standing Status: Future   Referral Priority: Routine Referral Type: Consultation   Referral Reason: Specialty Services Required   Requested Specialty: Internal Medicine   Number of Visits Requested: 1       Significant Diagnostic Studies: Labs: All labs within the past 24 hours have been reviewed    Pending Diagnostic Studies:     Procedure Component Value Units Date/Time    Strongyloides IgG Antibodies [[id number]] Collected: 10/29/23 0918    Order Status: Sent Lab Status: In process Updated: 10/29/23 1001    Specimen: Blood     Narrative:      Collection has been rescheduled by RFW at 10/29/2023 06:14 Reason:   Patient went to ultrasound    Troponin I [[id number]]     Order Status: Sent Lab Status: No result     Specimen: Blood          Medications:  Reconciled Home Medications:      Medication List      START taking these medications    fluticasone furoate-vilanteroL 100-25 mcg/dose diskus inhaler  Commonly known as: BREO  Inhale 1 puff into the lungs once daily. Controller  Start taking on: November 1, 2023     folic acid 1 MG tablet  Commonly known as: FOLVITE  Take 1 tablet (1 mg total) by mouth once daily.  Start taking on: November 1, 2023     multivitamin Tab  Take 1 tablet by mouth once daily.  Start taking on: November 1, 2023     QUEtiapine 50 MG tablet  Commonly known as:  SEROQUEL  Take 1 tablet (50 mg total) by mouth every evening.     tiotropium bromide 2.5 mcg/actuation inhaler  Commonly known as: SPIRIVA RESPIMAT  Inhale 2 puffs into the lungs once daily. Controller  Start taking on: November 1, 2023        CONTINUE taking these medications    acetaminophen 650 MG Tbsr  Commonly known as: TYLENOL  Take 1 tablet (650 mg total) by mouth every 6 to 8 hours as needed (pain (mild-moderate)).     allopurinoL 100 MG tablet  Commonly known as: ZYLOPRIM  Take 2 tablets (200 mg total) by mouth once daily.     amLODIPine 5 MG tablet  Commonly known as: NORVASC  TAKE 1 TABLET BY MOUTH EVERY DAY     aspirin 81 MG EC tablet  Commonly known as: ECOTRIN  Take 81 mg by mouth once daily.     atorvastatin 80 MG tablet  Commonly known as: LIPITOR  TAKE 1 TABLET BY MOUTH EVERY DAY     benzonatate 200 MG capsule  Commonly known as: TESSALON  Take 200 mg by mouth 3 (three) times daily as needed for Cough.     carvediloL 6.25 MG tablet  Commonly known as: COREG  Take 1 tablet (6.25 mg total) by mouth 2 (two) times daily with meals.     celecoxib 200 MG capsule  Commonly known as: CeleBREX  Take 1 capsule (200 mg total) by mouth as needed.     colchicine (gout) 0.6 mg tablet  Commonly known as: COLCRYS  Take 1 tablet (0.6 mg total) by mouth once daily. As needed for gout     fluticasone propionate 50 mcg/actuation nasal spray  Commonly known as: FLONASE  SPRAY ONCE INTO EACH NOSTRIL ONCE DAILY     melatonin 3 mg tablet  Commonly known as: MELATIN  Take 2 tablets (6 mg total) by mouth nightly as needed for Insomnia.     montelukast 10 mg tablet  Commonly known as: SINGULAIR  Take 1 tablet (10 mg total) by mouth every evening.     pantoprazole 40 MG tablet  Commonly known as: PROTONIX  Take 1 tablet (40 mg total) by mouth once daily.     PRENATAL MULTI ORAL  Take 1 tablet by mouth once daily.        STOP taking these medications    diphenhydrAMINE-acetaminophen  mg Tab  Commonly known as: TYLENOL  PM        ASK your doctor about these medications    hydroCHLOROthiazide 25 MG tablet  Commonly known as: HYDRODIURIL  Take 1 tablet (25 mg total) by mouth once daily.            Indwelling Lines/Drains at time of discharge:   Lines/Drains/Airways     None                 Time spent on the discharge of patient: 35 minutes         Adan Cabrera MD  Department of Hospital Medicine  [facility]

## 2023-10-31 NOTE — PLAN OF CARE
[facility]      HOME HEALTH ORDERS  FACE TO FACE ENCOUNTER    Patient Name: Jessica Floyd  YOB: 1950    PCP: Effie Olmedo MD   PCP Address: [address] [place] [address]  PCP Phone Number: [phone]  PCP Fax: [phone]    Encounter Date: 10/28/23    Admit to Home Health    Diagnoses:  Active Hospital Problems    Diagnosis  POA    *SOB (shortness of breath) [R06.02]  Yes    PAC (premature atrial contraction) [I49.1]  Unknown    Acute hypoxic respiratory failure [J96.01]  Yes    Eosinophilia [D72.10]  Yes    Severe anxiety [F41.9]  Yes    Hypertension [I10]  Yes    Coronary artery disease involving native coronary artery of native heart without angina pectoris [I25.10]  Yes     Outside [facility] 2014:  mLAD 20%  mCx 50%  mRCA 20%        Resolved Hospital Problems   No resolved problems to display.       Follow Up Appointments:  Future Appointments   Date Time Provider Department Center   11/6/2023  5:20 PM Effie Olmedo MD [facility] PRICARE [facility]   12/1/2023  9:30 AM Mckay Cosby, NP [facility] ORTHO [facility]   12/15/2023  9:45 AM [facility] LAB [facility]   12/18/2023 11:20 AM Anh Martinez MD [facility] HEM ONC [facility]   12/27/2023 10:30 AM Juan Naranjo MD [facility] RMTLGY [place]   3/5/2024  9:00 AM Michael Lal MD [facility] [id number] [facility]   6/17/2024  9:00 AM [facility] DEXA1 [facility] BMD [facility]       Allergies:Review of patient's allergies indicates:  No Known Allergies    Medications: Review discharge medications with patient and family and provide education.    Current Facility-Administered Medications   Medication Dose Route Frequency Provider Last Rate Last Admin    acetaminophen tablet 650 mg  650 mg Oral Q4H PRN Mari Diaz MD        allopurinoL tablet 200 mg  200 mg Oral Daily Mari Diaz MD   200 mg at 10/31/23 1012    aluminum-magnesium hydroxide-simethicone 200-200-20 mg/5 mL suspension 30 mL  30 mL Oral QID PRN Emily,  MD Mari   30 mL at 10/30/23 1826    amLODIPine tablet 5 mg  5 mg Oral Daily Mari Diaz MD   5 mg at 10/31/23 1012    aspirin EC tablet 81 mg  81 mg Oral Daily Mari Diaz MD   81 mg at 10/31/23 1012    atorvastatin tablet 80 mg  80 mg Oral Daily Mari Diaz MD   80 mg at 10/31/23 1012    carvediloL tablet 6.25 mg  6.25 mg Oral BID WM Mari Diaz MD   6.25 mg at 10/31/23 1012    dextrose 10% bolus 125 mL 125 mL  12.5 g Intravenous PRN Adan Cabrera MD        enoxaparin injection 40 mg  40 mg Subcutaneous Daily Krysta Mooney MD   40 mg at 10/30/23 1747    fluticasone furoate-vilanteroL 100-25 mcg/dose diskus inhaler 1 puff  1 puff Inhalation Daily Krysta Mooney MD   1 puff at 10/31/23 0913    fluticasone propionate 50 mcg/actuation nasal spray 50 mcg  1 spray Each Nostril Daily Mari Diaz MD   50 mcg at 10/31/23 1016    folic acid tablet 1 mg  1 mg Oral Daily Adan Cabrera MD   1 mg at 10/31/23 1012    glucagon (human recombinant) injection 1 mg  1 mg Intramuscular PRN Adan Cabrera MD        glucose chewable tablet 16 g  16 g Oral PRN Adan Cabrera MD        melatonin tablet 3 mg  3 mg Oral Nightly PRN Mari Diaz MD   3 mg at 10/29/23 2011    multivitamin tablet  1 tablet Oral Daily Adan Cabrera MD   1 tablet at 10/31/23 1012    naloxone 0.4 mg/mL injection 0.02 mg  0.02 mg Intravenous PRN Adan Cabrera MD        pantoprazole EC tablet 40 mg  40 mg Oral Daily Mari Diaz MD   40 mg at 10/31/23 1012    QUEtiapine tablet 50 mg  50 mg Oral QHS Farrukh Mansfield MD   50 mg at 10/30/23 2025    sodium chloride 0.9% flush 10 mL  10 mL Intravenous Q12H PRN Adan Cabrera MD        thiamine (B-1) 250 mg in dextrose 5 % (D5W) 100 mL IVPB  250 mg Intravenous Daily Adan Cabrera MD   Stopped at 10/30/23 0943    Followed by    [START ON 11/1/2023] thiamine tablet 100 mg  100 mg Oral TID Adan Cabrera MD        tiotropium bromide 2.5 mcg/actuation inhaler 2  puff  2 puff Inhalation Daily Krysta Mooney MD   2 puff at 10/31/23 0912     Facility-Administered Medications Ordered in Other Encounters   Medication Dose Route Frequency Provider Last Rate Last Admin    mupirocin 2 % ointment   Nasal On Call Procedure Kang Diaz MD   Given at 10/25/23 1045    tranexamic acid (CYKLOKAPRON) 1,000 mg in sodium chloride 0.9 % 100 mL IVPB (MB+)  1,000 mg Intravenous Once Kang Diaz MD        tranexamic acid (CYKLOKAPRON) 1,000 mg in sodium chloride 0.9 % 100 mL IVPB (MB+)  1,000 mg Intravenous Once Kang Diaz MD         Current Discharge Medication List        START taking these medications    Details   fluticasone furoate-vilanteroL (BREO) 100-25 mcg/dose diskus inhaler Inhale 1 puff into the lungs once daily. Controller  Qty: 60 each, Refills: 11      folic acid (FOLVITE) 1 MG tablet Take 1 tablet (1 mg total) by mouth once daily.  Qty: 90 tablet, Refills: 3      multivitamin (THERAGRAN) tablet Take 1 tablet by mouth once daily.  Qty: 90 tablet, Refills: 3      QUEtiapine (SEROQUEL) 50 MG tablet Take 1 tablet (50 mg total) by mouth every evening.  Qty: 30 tablet, Refills: 11      tiotropium bromide (SPIRIVA RESPIMAT) 2.5 mcg/actuation inhaler Inhale 2 puffs into the lungs once daily. Controller  Qty: 4 g, Refills: 2           CONTINUE these medications which have CHANGED    Details   celecoxib (CELEBREX) 200 MG capsule Take 1 capsule (200 mg total) by mouth as needed.    Comments: PO delivery  Associated Diagnoses: Post-operative state      colchicine, gout, (COLCRYS) 0.6 mg tablet Take 1 tablet (0.6 mg total) by mouth once daily. As needed for gout  Qty: 30 tablet, Refills: 11    Associated Diagnoses: Gout, unspecified cause, unspecified chronicity, unspecified site           CONTINUE these medications which have NOT CHANGED    Details   acetaminophen (TYLENOL) 650 MG TbSR Take 1 tablet (650 mg total) by mouth every 6 to 8 hours as needed (pain  (mild-moderate)).  Qty: 120 tablet, Refills: 0    Comments: PO delivery  Associated Diagnoses: Post-operative state      allopurinoL (ZYLOPRIM) 100 MG tablet Take 2 tablets (200 mg total) by mouth once daily.  Qty: 180 tablet, Refills: 3    Associated Diagnoses: Chronic gout without tophus, unspecified cause, unspecified site      amLODIPine (NORVASC) 5 MG tablet TAKE 1 TABLET BY MOUTH EVERY DAY  Qty: 90 tablet, Refills: 1    Comments: DX Code Needed  . - .  Associated Diagnoses: Essential hypertension      aspirin (ECOTRIN) 81 MG EC tablet Take 81 mg by mouth once daily.      atorvastatin (LIPITOR) 80 MG tablet TAKE 1 TABLET BY MOUTH EVERY DAY  Qty: 90 tablet, Refills: 3    Associated Diagnoses: Dyslipidemia      benzonatate (TESSALON) 200 MG capsule Take 200 mg by mouth 3 (three) times daily as needed for Cough.      carvediloL (COREG) 6.25 MG tablet Take 1 tablet (6.25 mg total) by mouth 2 (two) times daily with meals.  Qty: 60 tablet, Refills: 11    Comments: .      fluticasone propionate (FLONASE) 50 mcg/actuation nasal spray SPRAY ONCE INTO EACH NOSTRIL ONCE DAILY  Qty: 16 mL, Refills: 3    Associated Diagnoses: Viral upper respiratory tract infection      hydroCHLOROthiazide (HYDRODIURIL) 25 MG tablet Take 1 tablet (25 mg total) by mouth once daily.  Qty: 90 tablet, Refills: 3    Associated Diagnoses: Primary hypertension      melatonin (MELATIN) 3 mg tablet Take 2 tablets (6 mg total) by mouth nightly as needed for Insomnia.  Refills: 0      montelukast (SINGULAIR) 10 mg tablet Take 1 tablet (10 mg total) by mouth every evening.  Qty: 90 tablet, Refills: 11      pantoprazole (PROTONIX) 40 MG tablet Take 1 tablet (40 mg total) by mouth once daily.  Qty: 90 tablet, Refills: 0    Associated Diagnoses: Gastroesophageal reflux disease without esophagitis      prenatal no122/iron/folic acid (PRENATAL MULTI ORAL) Take 1 tablet by mouth once daily.           STOP taking these medications        diphenhydrAMINE-acetaminophen (TYLENOL PM)  mg Tab Comments:   Reason for Stopping:                 I have seen and examined this patient within the last 30 days. My clinical findings that support the need for the home health skilled services and home bound status are the following:no   Weakness/numbness causing balance and gait disturbance due to COPD Exacerbation and Weakness/Debility making it taxing to leave home.     Diet:   cardiac diet    Labs:  Report Lab results to PCP.    Referrals/ Consults  Physical Therapy to evaluate and treat. Evaluate for home safety and equipment needs; Establish/upgrade home exercise program. Perform / instruct on therapeutic exercises, gait training, transfer training, and Range of Motion.  Occupational Therapy to evaluate and treat. Evaluate home environment for safety and equipment needs. Perform/Instruct on transfers, ADL training, ROM, and therapeutic exercises.    Activities:   activity as tolerated    Nursing:   Agency to admit patient within 24 hours of hospital discharge unless specified on physician order or at patient request    SN to complete comprehensive assessment including routine vital signs. Instruct on disease process and s/s of complications to report to MD. Review/verify medication list sent home with the patient at time of discharge  and instruct patient/caregiver as needed. Frequency may be adjusted depending on start of care date.     Skilled nurse to perform up to 3 visits PRN for symptoms related to diagnosis    Notify MD if SBP > 160 or < 90; DBP > 90 or < 50; HR > 120 or < 50; Temp > 101; O2 < 88%;     Ok to schedule additional visits based on staff availability and patient request on consecutive days within the home health episode.    When multiple disciplines ordered:    Start of Care occurs on Sunday - Wednesday schedule remaining discipline evaluations as ordered on separate consecutive days following the start of care.    Thursday SOC  -schedule subsequent evaluations Friday and Monday the following week.     Friday - Saturday SOC - schedule subsequent discipline evaluations on consecutive days starting Monday of the following week.    For all post-discharge communication and subsequent orders please contact patient's primary care physician. If unable to reach primary care physician or do not receive response within 30 minutes, please contact Dr. Cabrera for clinical staff order clarification    Miscellaneous   Home Oxygen:  Oxygen at 1 L/min nasal canula to be used:  Continuously.    Home Health Aide:  Physical Therapy Three times weekly and Occupational Therapy Three times weekly    Wound Care Orders  yes:  Pressure Ulcer(s) Stage I :   Location: sacrum  Apply Miconzazole:  Barrier ointment                       Frequency:  Daily                             If incontinent of stool or urine, apply thin layer Barrier cream                   twice daily and PRN to wound                    I certify that this patient is confined to her home and needs physical therapy and occupational therapy.

## 2023-10-31 NOTE — PLAN OF CARE
[facility] and [facility]  unable to accept patient at this time.  Per medical team, pt will need to be seen by  earlier than next week.  BRYAN sent additional referral to [facility].      Discharge Plan A and Plan B have been determined by review of patient's clinical status, future medical and therapeutic needs, and coverage/benefits for post-acute care in coordination with multidisciplinary team members.    4:42 PM  SW met with pt and spouse to discuss d/c plan and advised [facility] , [facility], and [facility] were unable to accept.  BRYAN sent additional referrals to [facility], and [facility].   Pt agreeable to going home with family support until we have an accepting HH agency. Referral for OPCM.      Waiting for deliver of DME at bedside.       10/31/23 8247   Post-Acute Status   Post-Acute Authorization Home Health   Home Health Status Referrals Sent   Discharge Delays None known at this time   Discharge Plan   Discharge Plan A Home;Home with family;Home Health   Discharge Plan B Home;Home with family     Ericka Riggs LMSW  PRN-  [facility]  Ext. [phone]

## 2023-10-31 NOTE — NURSING
Patient discharged from unit via self. Discharge instructions given and reviewed and IV removed. Patient's medications were delivered bedside. Patient verbalized no concerns.

## 2023-11-01 ENCOUNTER — TELEPHONE (OUTPATIENT)
Dept: ADMINISTRATIVE | Facility: CLINIC | Age: 73
End: 2023-11-01
Payer: MEDICARE

## 2023-11-01 LAB — STRONGYLOIDES ANTIBODY IGG: NEGATIVE

## 2023-11-01 NOTE — PLAN OF CARE
[facility]  Discharge Final Note    Primary Care Provider: Effie Olmedo MD    Expected Discharge Date: 10/31/2023    Pt accepted by [facility] for  services.  Will contact pt regarding when they will be able to come out.  O2 received at bedside.      Discharge Plan A and Plan B have been determined by review of patient's clinical status, future medical and therapeutic needs, and coverage/benefits for post-acute care in coordination with multidisciplinary team members.      Future Appointments   Date Time Provider Department Center   11/6/2023  5:20 PM Effie Olmedo MD [facility] PRICARE [facility]   12/1/2023  9:30 AM Mckay Cosby, NP [facility] ORTHO [facility]   12/15/2023  9:45 AM LAB, [facility] LAB [facility]   12/18/2023 11:20 AM Anh Martinez MD [facility] HEM ONC [facility]   12/27/2023 10:30 AM Juan Naranjo MD [facility] RMTLGY [place]   3/5/2024  9:00 AM Michael Lal MD [facility] [id number] [facility]   6/17/2024  9:00 AM [facility] DEXA1 [facility] BMD [facility]       Final Discharge Note (most recent)       Final Note - 11/01/23 1605          Final Note    Assessment Type Final Discharge Note     Anticipated Discharge Disposition Home-Health Care Svc   [facility]       Post-Acute Status    Post-Acute Authorization Home Health     Home Health Status Set-up Complete/Auth obtained     Discharge Delays None known at this time                     Important Message from Medicare Sandra Encalade, LMSW  PRN-  [facility]  Ext. [phone]

## 2023-11-01 NOTE — PROGRESS NOTES
"Phoned patient in response to reply of "2" received after hours on 10/31 to post-discharge texting tracker.  Patient states she just got up and is unable to talk. She states her issue was resolved on last night right after she sent a text reply.       "

## 2023-11-02 PROCEDURE — G0180 MD CERTIFICATION HHA PATIENT: HCPCS | Mod: ,,, | Performed by: FAMILY MEDICINE

## 2023-11-06 ENCOUNTER — OUTPATIENT CASE MANAGEMENT (OUTPATIENT)
Dept: ADMINISTRATIVE | Facility: OTHER | Age: 73
End: 2023-11-06
Payer: MEDICARE

## 2023-11-06 ENCOUNTER — PATIENT MESSAGE (OUTPATIENT)
Dept: PRIMARY CARE CLINIC | Facility: CLINIC | Age: 73
End: 2023-11-06

## 2023-11-06 NOTE — TELEPHONE ENCOUNTER
Called and spoke with patient to attempt to reschedule  pulmonary consult appointment. Offered patient appoitment this week at [facility], pt. Declined reporting she doesn't drive her  drives her and he can't drive to and back from [place] while working.

## 2023-11-06 NOTE — LETTER
Jessica Floyd  [address] [address]      Dear Jessica Floyd,     I work with [facility]'s Outpatient Care Management Department. We received a referral to call you to discuss your medical history. These services are free of charge and are offered to [facility] patients who have recently been discharged from any of our facilities or who have medical conditions that may require the skill of a nurse to assist with management.     I am a Registered Nurse who specializes in connecting patients with available medical and financial resources as well as addressing any educational needs that may be indicated.    I attempted to reach you by telephone, but I was unsuccessful. Please call our department so that we can go over some questions with you, regarding your health.    The Outpatient Care Management Department can be reached at [phone], from 8:00AM to 4:30 PM, on Monday thru Friday.     Additionally, [facility] also has a program where a nurse is available 24/7 to answer questions or provide medical advice, their number is [phone].      Thanks,    Nabila Mejias RN  Outpatient Care Management  Phone #: [phone]

## 2023-11-07 ENCOUNTER — OFFICE VISIT (OUTPATIENT)
Dept: CARDIOLOGY | Facility: CLINIC | Age: 73
End: 2023-11-07
Payer: MEDICARE

## 2023-11-07 ENCOUNTER — TELEPHONE (OUTPATIENT)
Dept: PRIMARY CARE CLINIC | Facility: CLINIC | Age: 73
End: 2023-11-07
Payer: MEDICARE

## 2023-11-07 VITALS
OXYGEN SATURATION: 97 % | DIASTOLIC BLOOD PRESSURE: 76 MMHG | HEART RATE: 78 BPM | SYSTOLIC BLOOD PRESSURE: 142 MMHG | WEIGHT: 119.31 LBS | BODY MASS INDEX: 20.48 KG/M2

## 2023-11-07 DIAGNOSIS — R55 VASOVAGAL SYNCOPE: Primary | ICD-10-CM

## 2023-11-07 DIAGNOSIS — I65.21 STENOSIS OF RIGHT CAROTID ARTERY: ICD-10-CM

## 2023-11-07 DIAGNOSIS — I25.10 ATHEROSCLEROSIS OF NATIVE CORONARY ARTERY OF NATIVE HEART WITHOUT ANGINA PECTORIS: ICD-10-CM

## 2023-11-07 DIAGNOSIS — I10 PRIMARY HYPERTENSION: ICD-10-CM

## 2023-11-07 DIAGNOSIS — I70.0 AORTIC ATHEROSCLEROSIS: ICD-10-CM

## 2023-11-07 PROCEDURE — 3044F PR MOST RECENT HEMOGLOBIN A1C LEVEL <7.0%: ICD-10-PCS | Mod: HCNC,CPTII,S$GLB, | Performed by: INTERNAL MEDICINE

## 2023-11-07 PROCEDURE — 3288F PR FALLS RISK ASSESSMENT DOCUMENTED: ICD-10-PCS | Mod: HCNC,CPTII,S$GLB, | Performed by: INTERNAL MEDICINE

## 2023-11-07 PROCEDURE — 99999 PR PBB SHADOW E&M-EST. PATIENT-LVL IV: ICD-10-PCS | Mod: PBBFAC,HCNC,, | Performed by: INTERNAL MEDICINE

## 2023-11-07 PROCEDURE — 99999 PR PBB SHADOW E&M-EST. PATIENT-LVL IV: CPT | Mod: PBBFAC,HCNC,, | Performed by: INTERNAL MEDICINE

## 2023-11-07 PROCEDURE — 1159F MED LIST DOCD IN RCRD: CPT | Mod: HCNC,CPTII,S$GLB, | Performed by: INTERNAL MEDICINE

## 2023-11-07 PROCEDURE — 1160F RVW MEDS BY RX/DR IN RCRD: CPT | Mod: HCNC,CPTII,S$GLB, | Performed by: INTERNAL MEDICINE

## 2023-11-07 PROCEDURE — 1126F PR PAIN SEVERITY QUANTIFIED, NO PAIN PRESENT: ICD-10-PCS | Mod: HCNC,CPTII,S$GLB, | Performed by: INTERNAL MEDICINE

## 2023-11-07 PROCEDURE — 3077F SYST BP >= 140 MM HG: CPT | Mod: HCNC,CPTII,S$GLB, | Performed by: INTERNAL MEDICINE

## 2023-11-07 PROCEDURE — 1101F PT FALLS ASSESS-DOCD LE1/YR: CPT | Mod: HCNC,CPTII,S$GLB, | Performed by: INTERNAL MEDICINE

## 2023-11-07 PROCEDURE — 3044F HG A1C LEVEL LT 7.0%: CPT | Mod: HCNC,CPTII,S$GLB, | Performed by: INTERNAL MEDICINE

## 2023-11-07 PROCEDURE — 3078F PR MOST RECENT DIASTOLIC BLOOD PRESSURE < 80 MM HG: ICD-10-PCS | Mod: HCNC,CPTII,S$GLB, | Performed by: INTERNAL MEDICINE

## 2023-11-07 PROCEDURE — 99215 PR OFFICE/OUTPT VISIT, EST, LEVL V, 40-54 MIN: ICD-10-PCS | Mod: HCNC,S$GLB,, | Performed by: INTERNAL MEDICINE

## 2023-11-07 PROCEDURE — 1111F DSCHRG MED/CURRENT MED MERGE: CPT | Mod: HCNC,CPTII,S$GLB, | Performed by: INTERNAL MEDICINE

## 2023-11-07 PROCEDURE — 1160F PR REVIEW ALL MEDS BY PRESCRIBER/CLIN PHARMACIST DOCUMENTED: ICD-10-PCS | Mod: HCNC,CPTII,S$GLB, | Performed by: INTERNAL MEDICINE

## 2023-11-07 PROCEDURE — 1111F PR DISCHARGE MEDS RECONCILED W/ CURRENT OUTPATIENT MED LIST: ICD-10-PCS | Mod: HCNC,CPTII,S$GLB, | Performed by: INTERNAL MEDICINE

## 2023-11-07 PROCEDURE — 1101F PR PT FALLS ASSESS DOC 0-1 FALLS W/OUT INJ PAST YR: ICD-10-PCS | Mod: HCNC,CPTII,S$GLB, | Performed by: INTERNAL MEDICINE

## 2023-11-07 PROCEDURE — 1126F AMNT PAIN NOTED NONE PRSNT: CPT | Mod: HCNC,CPTII,S$GLB, | Performed by: INTERNAL MEDICINE

## 2023-11-07 PROCEDURE — 3288F FALL RISK ASSESSMENT DOCD: CPT | Mod: HCNC,CPTII,S$GLB, | Performed by: INTERNAL MEDICINE

## 2023-11-07 PROCEDURE — 3077F PR MOST RECENT SYSTOLIC BLOOD PRESSURE >= 140 MM HG: ICD-10-PCS | Mod: HCNC,CPTII,S$GLB, | Performed by: INTERNAL MEDICINE

## 2023-11-07 PROCEDURE — 1159F PR MEDICATION LIST DOCUMENTED IN MEDICAL RECORD: ICD-10-PCS | Mod: HCNC,CPTII,S$GLB, | Performed by: INTERNAL MEDICINE

## 2023-11-07 PROCEDURE — 3078F DIAST BP <80 MM HG: CPT | Mod: HCNC,CPTII,S$GLB, | Performed by: INTERNAL MEDICINE

## 2023-11-07 PROCEDURE — 99215 OFFICE O/P EST HI 40 MIN: CPT | Mod: HCNC,S$GLB,, | Performed by: INTERNAL MEDICINE

## 2023-11-07 PROCEDURE — 3008F PR BODY MASS INDEX (BMI) DOCUMENTED: ICD-10-PCS | Mod: HCNC,CPTII,S$GLB, | Performed by: INTERNAL MEDICINE

## 2023-11-07 PROCEDURE — 3008F BODY MASS INDEX DOCD: CPT | Mod: HCNC,CPTII,S$GLB, | Performed by: INTERNAL MEDICINE

## 2023-11-07 NOTE — TELEPHONE ENCOUNTER
Do we need to schedule in-person or virtual for a hospital follow up?   Dr. Luisa Reddy returned call about med changes he states that he put in all the orders/changes that he wanted. And that the discharging physician would need to do the med rec.      Staci Sebastian RN  04/18/23 [id number]

## 2023-11-07 NOTE — TELEPHONE ENCOUNTER
----- Message from Odalys Beltran MA sent at 11/7/2023 10:18 AM CST -----  Good morning       The above patient is needing a appointment today If possible or the earliest appointment that may come available pt is stating she is suffering with a severe sinus infection.     Pt contact [phone]

## 2023-11-07 NOTE — PROGRESS NOTES
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  observations.    Premature Infant recommended reference ranges:  Up to 24 hours.............<8.0 mg/dL  Up to 48 hours............<12.0 mg/dL  3-5 days..................<15.0 mg/dL  6-29 days.................<15.0 mg/dL      Alkaline Phosphatase 06/12/2023 73  55 - 135 U/L Final    AST 06/12/2023 28  10 - 40 U/L Final    ALT 06/12/2023 20  10 - 44 U/L Final    Anion Gap 06/12/2023 9  8 - 16 mmol/L Final    eGFR 06/12/2023 >60.0  >60 mL/min/1.73 m^2 Final    PTH, Intact 06/12/2023 52.1  9.0 - 77.0 pg/mL Final    Free T4 06/12/2023 0.93  0.71 - 1.51 ng/dL Final    TSH 06/12/2023 1.140  0.400 - 4.000 uIU/mL Final    Vit D, 25-Hydroxy 06/12/2023 42  30 - 96 ng/mL Final    Comment: Vitamin D deficiency.........<10 ng/mL                              Vitamin D insufficiency......10-29 ng/mL       Vitamin D sufficiency........> or equal to 30 ng/mL  Vitamin D toxicity............>100 ng/mL     No results displayed because visit has over 200 results.      Lab Visit on 05/25/2023   Component Date Value Ref Range Status    WBC 05/25/2023 7.26  3.90 - 12.70 K/uL Final    RBC 05/25/2023 3.71 (L)  4.00 - 5.40 M/uL Final    Hemoglobin 05/25/2023 11.2 (L)  12.0 - 16.0 g/dL Final    Hematocrit 05/25/2023 35.2  (L)  37.0 - 48.5 % Final    MCV 05/25/2023 95  82 - 98 fL Final    MCH 05/25/2023 30.2  27.0 - 31.0 pg Final    MCHC 05/25/2023 31.8 (L)  32.0 - 36.0 g/dL Final    RDW 05/25/2023 24.3 (H)  11.5 - 14.5 % Final    Platelets 05/25/2023 265  150 - 450 K/uL Final    MPV 05/25/2023 10.5  9.2 - 12.9 fL Final    Immature Granulocytes 05/25/2023 0.7 (H)  0.0 - 0.5 % Final    Gran # (ANC) 05/25/2023 3.3  1.8 - 7.7 K/uL Final    Immature Grans (Abs) 05/25/2023 0.05 (H)  0.00 - 0.04 K/uL Final    Comment: Mild elevation in immature granulocytes is non specific and   can be seen in a variety of conditions including stress response,   acute inflammation, trauma and pregnancy. Correlation with other   laboratory and clinical findings is essential.      Lymph # 05/25/2023 1.5  1.0 - 4.8 K/uL Final    Mono # 05/25/2023 2.1 (H)  0.3 - 1.0 K/uL Final    Eos # 05/25/2023 0.3  0.0 - 0.5 K/uL Final    Baso # 05/25/2023 0.05  0.00 - 0.20 K/uL Final    nRBC 05/25/2023 0  0 /100 WBC Final    Gran % 05/25/2023 45.4  38.0 - 73.0 % Final    Lymph % 05/25/2023 20.5  18.0 - 48.0 % Final    Mono % 05/25/2023 28.8 (H)  4.0 - 15.0 % Final    Eosinophil % 05/25/2023 3.9  0.0 - 8.0 % Final    Basophil % 05/25/2023 0.7  0.0 - 1.9 % Final    Platelet Estimate 05/25/2023 Appears normal   Final    Aniso 05/25/2023 Slight   Final    Poik 05/25/2023 CANCELED   Final    Result canceled by the ancillary.    Poly 05/25/2023 Occasional   Final    Hypo 05/25/2023 Occasional   Final    Smudge Cells 05/25/2023 CANCELED   Final    Result canceled by the ancillary.    Differential Method 05/25/2023 Automated   Final    Sodium 05/25/2023 135 (L)  136 - 145 mmol/L Final    Potassium 05/25/2023 3.0 (L)  3.5 - 5.1 mmol/L Final    Chloride 05/25/2023 95  95 - 110 mmol/L Final    CO2 05/25/2023 27  23 - 29 mmol/L Final    Glucose 05/25/2023 91  70 - 110 mg/dL Final    BUN 05/25/2023 20  8 - 23 mg/dL Final    Creatinine 05/25/2023 1.2  0.5 - 1.4 mg/dL Final    Calcium  "05/25/2023 8.2 (L)  8.7 - 10.5 mg/dL Final    Total Protein 05/25/2023 6.6  6.0 - 8.4 g/dL Final    Albumin 05/25/2023 3.7  3.5 - 5.2 g/dL Final    Total Bilirubin 05/25/2023 0.6  0.1 - 1.0 mg/dL Final    Comment: For infants and newborns, interpretation of results should be based  on gestational age, weight and in agreement with clinical  observations.    Premature Infant recommended reference ranges:  Up to 24 hours.............<8.0 mg/dL  Up to 48 hours............<12.0 mg/dL  3-5 days..................<15.0 mg/dL  6-29 days.................<15.0 mg/dL      Alkaline Phosphatase 05/25/2023 53 (L)  55 - 135 U/L Final    AST 05/25/2023 34  10 - 40 U/L Final    ALT 05/25/2023 25  10 - 44 U/L Final    Anion Gap 05/25/2023 13  8 - 16 mmol/L Final    eGFR 05/25/2023 47.8 (A)  >60 mL/min/1.73 m^2 Final    Uric Acid 05/25/2023 5.5  2.4 - 5.7 mg/dL Final   Clinical Support on 05/10/2023   Component Date Value Ref Range Status    Fecal Occult Blood 05/05/2023 Negative  Negative Final    No date listed on card    Fecal Occult Blood 05/05/2023 Negative  Negative Final    No date listed on card    Fecal Occult Blood 05/05/2023 Negative  Negative Final    No date listed on card     Acceptable 05/05/2023 Yes   Final       Imaging  CT Chest Without Contrast    Result Date: 10/31/2023  EXAMINATION: CT CHEST WITHOUT CONTRAST CLINICAL HISTORY: "Dyspnea, chronic, unclear etiology;" COMPARISON: 10/28/2023. TECHNIQUE: Volumetric data acquisition of the chest from the lung apices to the adrenals was obtained without intravenous contrast. Sagittal and coronal multiplanar reconstructions were performed. Lack of IV contrast material limits the assessment of mediastinal and abdominal structures. FINDINGS: The airways are patent. The thyroid gland is homogeneous, normal in size. No mediastinal, hilar or subcarinal adenopathy. The thoracic aorta is of normal caliber and demonstrates moderate atherosclerotic plaque. The cardiac " silhouette appears within normal limits in size, no pericardial effusion.  Coronary artery calcifications seen.  Stent in the right common carotid artery. The esophagus appears normal in course and caliber. Three lobular and paraseptal emphysema. New cluster of micro nodules within the right lower lobe, [id number].  Small Bochdalek hernia. The left lung is well aerated. No pleural effusion. The axillary regions appear normal. The upper abdominal organs demonstrate a left hepatic lobe low attenuating lesion 2.3 cm. The osseous structures straight mild loss of height at T11-T12 L1, unchanged.  No new fracture.  Moderate degenerative change of the right shoulder joint.     Small, new cluster of micro nodules, right lower lobe, most often seen in the setting of infection, follow-up recommended after treatment completed to ensure resolution. Emphysema. Left hepatic lobe hypoattenuating lesion suggestive of a cyst consider non emergent ultrasound evaluation. See other details above. This report was flagged in Epic as abnormal. Electronically signed by: Joan Fernández MD Date:    10/31/2023 Time:    15:05    Echo    Result Date: 10/29/2023    Left Ventricle: The left ventricle is normal in size. Normal wall thickness. Normal wall motion. There is normal systolic function with a visually estimated ejection fraction of 60 - 65%. There is normal diastolic function.   Right Ventricle: Mild right ventricular enlargement. Wall thickness is normal. Right ventricle wall motion  is normal. Systolic function is normal.   Aortic Valve: The aortic valve is a unicuspid valve. There is mild aortic valve sclerosis. There is trace aortic regurgitation.   Mitral Valve: There is mild regurgitation.   Pulmonary Artery: There is mild pulmonary hypertension. The estimated pulmonary artery systolic pressure is 40 mmHg.   IVC/SVC: Intermediate venous pressure at 8 mmHg.     US Carotid Bilateral    Result Date: 10/29/2023  EXAMINATION: US  CAROTID BILATERAL CLINICAL HISTORY: check patency of known carotid stent given syncope; TECHNIQUE: Grayscale and color spectral Doppler ultrasound imaging of the carotid and vertebral artery systems bilaterally. COMPARISON: None FINDINGS: Patient with reported right common carotid artery stent. Right: Internal Carotid Artery (ICA) peak systolic velocity 22 cm/sec Internal Carotid Artery (ICA) end-diastolic velocity 7 cm/sec Common Carotid Artery (CCA) peak systolic velocity 30 cm/sec Common Carotid Artery (CCA) end-diastolic velocity 8 cm/sec ICA/CCA peak systolic velocity ratio: 0.7 ICA/CCA end-diastolic velocity ratio: 0.8 Plaque formation: Homogeneous Vertebral artery: Antegrade flow and normal waveform. Left: Internal Carotid Artery (ICA)  peak systolic velocity 43 cm/sec Internal Carotid Artery (ICA) end-diastolic velocity 15 cm/sec Common Carotid Artery (CCA) peak systolic velocity 47 cm/sec Common Carotid Artery (CCA) end-diastolic velocity 11 cm/sec ICA/CCA peak systolic velocity ratio: 0.9 ICA/CCA end diastolic velocity ratio: 1.4 Plaque formation: Homogeneous Vertebral artery: Antegrade flow and normal waveform.     1. Right common carotid artery stent patent. 2. 1 - 39% stenosis of the right internal carotid artery. 3. 1 - 39% stenosis of the left internal carotid artery. Electronically signed by resident: Keo Jang Date:    10/29/2023 Time:    08:45 Electronically signed by: Danis Guerrero Jr Date:    10/29/2023 Time:    08:50    US Lower Extremity Veins Bilateral    Result Date: 10/29/2023  EXAMINATION: US LOWER EXTREMITY VEINS BILATERAL CLINICAL HISTORY: to rule out dvt as cta of chest was not able to r/o subsegmental clots; TECHNIQUE: Duplex and color flow Doppler and dynamic compression was performed of the bilateral lower extremity veins was performed. COMPARISON: None. FINDINGS: Right thigh veins: The common femoral, femoral, popliteal, upper greater saphenous, and deep femoral veins are  patent and free of thrombus. The veins are normally compressible and have normal phasic flow and augmentation response. Right calf veins: The visualized calf veins are patent. Left thigh veins: The common femoral, femoral, popliteal, upper greater saphenous, and deep femoral veins are patent and free of thrombus. The veins are normally compressible and have normal phasic flow and augmentation response. Left calf veins: The visualized calf veins are patent. Miscellaneous: No organized fluid collection.  Mild soft tissue edema.     No evidence of deep venous thrombosis in either lower extremity. Electronically signed by: Rick Liu MD Date:    10/29/2023 Time:    07:25    CTA Chest Non-Coronary (PE Studies)    Addendum Date: 10/28/2023    Right perihilar mass was question on previous exam, on CT, this correlates with vascular structures. Electronically signed by: Armani Yuan MD Date:    10/28/2023 Time:    15:32    Result Date: 10/28/2023  EXAMINATION: CTA CHEST NON CORONARY (PE STUDIES) CLINICAL HISTORY: Pulmonary embolism (PE) suspected, unknown D-dimer; TECHNIQUE: Low dose axial images, sagittal and coronal reformations were obtained from the thoracic inlet to the lung bases following the IV administration of 75 mL of Omnipaque 350.  Contrast timing was optimized to evaluate the pulmonary arteries.  MIP images were performed. COMPARISON: Radiograph 10/28/2023 FINDINGS: The structures at the base of the neck are grossly unremarkable noting partially visualized right common carotid artery stent.  No significant mediastinal lymphadenopathy.  The heart is not enlarged.  The thoracic aorta tapers normally noting atherosclerotic calcification along its course.  Allowing for bolus timing, the visualized portions of the kidneys, spleen, adrenal glands and pancreas are unremarkable.  There is a low attenuating focus within the medial aspect of the left hepatic lobe measuring 2 cm, attenuation of which suggests  cyst. Motion artifact limits evaluation of the airways and pulmonary parenchyma.  Allowing for this, the airways are grossly patent.  There is biapical atelectasis or scarring.  There is bilateral pulmonary emphysematous change.  There is bilateral basilar dependent atelectasis.  No large focal consolidation.  No pneumothorax. Bolus timing is adequate for evaluation of pulmonary thromboembolism however examination is limited secondary to artifact from motion.  Allowing for this, no convincing pulmonary arterial filling defects to the level of the segmental branches bilaterally to suggest pulmonary thromboembolism.  Please note, there is particularly limited evaluation of the segmental branches to the bilateral lower lobes.  Correlation of these findings with D-dimer and/or lower extremity ultrasound as warranted. There are degenerative changes of the spine.  There is osteopenia.  There are remote appearing right rib injuries.  No significant axillary lymphadenopathy.  Schmorl's nodes involve the superior endplates of L1, T12 and T11.     1. Allowing for motion artifact, no convincing pulmonary thromboembolism.  Correlation of these findings with D-dimer and/or lower extremity ultrasound as warranted. 2. Pulmonary emphysematous change noting scattered regions of atelectasis or scarring.  No convincing large focal consolidation. 3. Degenerative changes of the spine. 4. Please see above for additional findings. Electronically signed by: Armani Yuan MD Date:    10/28/2023 Time:    12:33    X-Ray Chest 1 View    Result Date: 10/28/2023  EXAMINATION: XR CHEST 1 VIEW CLINICAL HISTORY: shortness of breath; TECHNIQUE: Single frontal view of the chest was performed. FINDINGS: The lungs are well expanded.  There is a right perihilar opacity cannot exclude underlying mass.  Recommend further evaluation with chest CT..  There is no pneumothorax or pleural fluid.  The cardiac silhouette is prominent.  The osseous structures  demonstrate degenerative change.     As above. Electronically signed by: Giovany Fernández MD Date:    10/28/2023 Time:    09:58      Assessment  1. Vasovagal syncope  Typical of her episodes    2. Primary hypertension  Controlled    3. Stenosis of right carotid artery  Nonobstructive by Doppler last month    4. Atherosclerosis of native coronary artery of native heart without angina pectoris  Nonobstructive disease by catheterization about 5 years ago    5. Aortic atherosclerosis   Noted on chest x-ray.  Continue risk factor modification efforts.      Plan and Discussion    Would not pursue further cardiac investigations at this point.  There is documentation of concern about pulmonary hypertension during her recent hospitalization.  She only has mild pulmonary hypertension with estimated systolic pulmonary pressure of 40.  Does not warrant further workup.    The 10-year ASCVD risk score (Joyce DK, et al., 2019) is: 22.5%    Values used to calculate the score:      Age: 73 years      Sex: Female      Is Non- : No      Diabetic: No      Tobacco smoker: No      Systolic Blood Pressure: 142 mmHg      Is BP treated: Yes      HDL Cholesterol: 69 mg/dL      Total Cholesterol: 176 mg/dL     Follow Up  As scheduled in March      Michael Lal MD

## 2023-11-10 ENCOUNTER — PATIENT MESSAGE (OUTPATIENT)
Dept: SPORTS MEDICINE | Facility: CLINIC | Age: 73
End: 2023-11-10
Payer: MEDICARE

## 2023-11-13 ENCOUNTER — PATIENT MESSAGE (OUTPATIENT)
Dept: PRIMARY CARE CLINIC | Facility: CLINIC | Age: 73
End: 2023-11-13
Payer: MEDICARE

## 2023-11-14 ENCOUNTER — PATIENT MESSAGE (OUTPATIENT)
Dept: SPORTS MEDICINE | Facility: CLINIC | Age: 73
End: 2023-11-14
Payer: MEDICARE

## 2023-11-14 ENCOUNTER — TELEPHONE (OUTPATIENT)
Dept: TRANSPLANT | Facility: CLINIC | Age: 73
End: 2023-11-14
Payer: MEDICARE

## 2023-11-14 ENCOUNTER — OFFICE VISIT (OUTPATIENT)
Dept: INTERNAL MEDICINE | Facility: CLINIC | Age: 73
End: 2023-11-14
Payer: MEDICARE

## 2023-11-14 ENCOUNTER — NURSE TRIAGE (OUTPATIENT)
Dept: ADMINISTRATIVE | Facility: CLINIC | Age: 73
End: 2023-11-14
Payer: MEDICARE

## 2023-11-14 ENCOUNTER — TELEPHONE (OUTPATIENT)
Dept: INTERNAL MEDICINE | Facility: CLINIC | Age: 73
End: 2023-11-14
Payer: MEDICARE

## 2023-11-14 VITALS
DIASTOLIC BLOOD PRESSURE: 86 MMHG | SYSTOLIC BLOOD PRESSURE: 144 MMHG | HEIGHT: 64 IN | BODY MASS INDEX: 20.36 KG/M2 | OXYGEN SATURATION: 93 % | WEIGHT: 119.25 LBS | HEART RATE: 86 BPM

## 2023-11-14 DIAGNOSIS — J06.9 VIRAL UPPER RESPIRATORY TRACT INFECTION: ICD-10-CM

## 2023-11-14 DIAGNOSIS — J06.9 UPPER RESPIRATORY TRACT INFECTION, UNSPECIFIED TYPE: Primary | ICD-10-CM

## 2023-11-14 DIAGNOSIS — R06.02 SOB (SHORTNESS OF BREATH): Primary | ICD-10-CM

## 2023-11-14 DIAGNOSIS — R05.9 COUGH, UNSPECIFIED TYPE: ICD-10-CM

## 2023-11-14 LAB
CTP QC/QA: YES
CTP QC/QA: YES
POC MOLECULAR INFLUENZA A AGN: NEGATIVE
POC MOLECULAR INFLUENZA B AGN: NEGATIVE
SARS-COV-2 AG RESP QL IA.RAPID: POSITIVE

## 2023-11-14 PROCEDURE — 3008F BODY MASS INDEX DOCD: CPT | Mod: HCNC,CPTII,S$GLB, | Performed by: STUDENT IN AN ORGANIZED HEALTH CARE EDUCATION/TRAINING PROGRAM

## 2023-11-14 PROCEDURE — 3077F SYST BP >= 140 MM HG: CPT | Mod: HCNC,CPTII,S$GLB, | Performed by: STUDENT IN AN ORGANIZED HEALTH CARE EDUCATION/TRAINING PROGRAM

## 2023-11-14 PROCEDURE — 87811 SARS-COV-2 COVID19 W/OPTIC: CPT | Mod: QW,HCNC,S$GLB, | Performed by: STUDENT IN AN ORGANIZED HEALTH CARE EDUCATION/TRAINING PROGRAM

## 2023-11-14 PROCEDURE — 3044F HG A1C LEVEL LT 7.0%: CPT | Mod: HCNC,CPTII,S$GLB, | Performed by: STUDENT IN AN ORGANIZED HEALTH CARE EDUCATION/TRAINING PROGRAM

## 2023-11-14 PROCEDURE — 3079F DIAST BP 80-89 MM HG: CPT | Mod: HCNC,CPTII,S$GLB, | Performed by: STUDENT IN AN ORGANIZED HEALTH CARE EDUCATION/TRAINING PROGRAM

## 2023-11-14 PROCEDURE — 99999 PR PBB SHADOW E&M-EST. PATIENT-LVL IV: CPT | Mod: PBBFAC,HCNC,, | Performed by: STUDENT IN AN ORGANIZED HEALTH CARE EDUCATION/TRAINING PROGRAM

## 2023-11-14 PROCEDURE — 3077F PR MOST RECENT SYSTOLIC BLOOD PRESSURE >= 140 MM HG: ICD-10-PCS | Mod: HCNC,CPTII,S$GLB, | Performed by: STUDENT IN AN ORGANIZED HEALTH CARE EDUCATION/TRAINING PROGRAM

## 2023-11-14 PROCEDURE — 3079F PR MOST RECENT DIASTOLIC BLOOD PRESSURE 80-89 MM HG: ICD-10-PCS | Mod: HCNC,CPTII,S$GLB, | Performed by: STUDENT IN AN ORGANIZED HEALTH CARE EDUCATION/TRAINING PROGRAM

## 2023-11-14 PROCEDURE — 1160F RVW MEDS BY RX/DR IN RCRD: CPT | Mod: HCNC,CPTII,S$GLB, | Performed by: STUDENT IN AN ORGANIZED HEALTH CARE EDUCATION/TRAINING PROGRAM

## 2023-11-14 PROCEDURE — 87502 POCT INFLUENZA A/B MOLECULAR: ICD-10-PCS | Mod: QW,HCNC,S$GLB, | Performed by: STUDENT IN AN ORGANIZED HEALTH CARE EDUCATION/TRAINING PROGRAM

## 2023-11-14 PROCEDURE — 99213 PR OFFICE/OUTPT VISIT, EST, LEVL III, 20-29 MIN: ICD-10-PCS | Mod: HCNC,S$GLB,, | Performed by: STUDENT IN AN ORGANIZED HEALTH CARE EDUCATION/TRAINING PROGRAM

## 2023-11-14 PROCEDURE — 1160F PR REVIEW ALL MEDS BY PRESCRIBER/CLIN PHARMACIST DOCUMENTED: ICD-10-PCS | Mod: HCNC,CPTII,S$GLB, | Performed by: STUDENT IN AN ORGANIZED HEALTH CARE EDUCATION/TRAINING PROGRAM

## 2023-11-14 PROCEDURE — 1125F AMNT PAIN NOTED PAIN PRSNT: CPT | Mod: HCNC,CPTII,S$GLB, | Performed by: STUDENT IN AN ORGANIZED HEALTH CARE EDUCATION/TRAINING PROGRAM

## 2023-11-14 PROCEDURE — 1125F PR PAIN SEVERITY QUANTIFIED, PAIN PRESENT: ICD-10-PCS | Mod: HCNC,CPTII,S$GLB, | Performed by: STUDENT IN AN ORGANIZED HEALTH CARE EDUCATION/TRAINING PROGRAM

## 2023-11-14 PROCEDURE — 99999 PR PBB SHADOW E&M-EST. PATIENT-LVL IV: ICD-10-PCS | Mod: PBBFAC,HCNC,, | Performed by: STUDENT IN AN ORGANIZED HEALTH CARE EDUCATION/TRAINING PROGRAM

## 2023-11-14 PROCEDURE — 1101F PT FALLS ASSESS-DOCD LE1/YR: CPT | Mod: HCNC,CPTII,S$GLB, | Performed by: STUDENT IN AN ORGANIZED HEALTH CARE EDUCATION/TRAINING PROGRAM

## 2023-11-14 PROCEDURE — 1101F PR PT FALLS ASSESS DOC 0-1 FALLS W/OUT INJ PAST YR: ICD-10-PCS | Mod: HCNC,CPTII,S$GLB, | Performed by: STUDENT IN AN ORGANIZED HEALTH CARE EDUCATION/TRAINING PROGRAM

## 2023-11-14 PROCEDURE — 87502 INFLUENZA DNA AMP PROBE: CPT | Mod: QW,HCNC,S$GLB, | Performed by: STUDENT IN AN ORGANIZED HEALTH CARE EDUCATION/TRAINING PROGRAM

## 2023-11-14 PROCEDURE — 3008F PR BODY MASS INDEX (BMI) DOCUMENTED: ICD-10-PCS | Mod: HCNC,CPTII,S$GLB, | Performed by: STUDENT IN AN ORGANIZED HEALTH CARE EDUCATION/TRAINING PROGRAM

## 2023-11-14 PROCEDURE — 3288F FALL RISK ASSESSMENT DOCD: CPT | Mod: HCNC,CPTII,S$GLB, | Performed by: STUDENT IN AN ORGANIZED HEALTH CARE EDUCATION/TRAINING PROGRAM

## 2023-11-14 PROCEDURE — 87811 SARS CORONAVIRUS 2 ANTIGEN POCT, MANUAL READ: ICD-10-PCS | Mod: QW,HCNC,S$GLB, | Performed by: STUDENT IN AN ORGANIZED HEALTH CARE EDUCATION/TRAINING PROGRAM

## 2023-11-14 PROCEDURE — 1159F PR MEDICATION LIST DOCUMENTED IN MEDICAL RECORD: ICD-10-PCS | Mod: HCNC,CPTII,S$GLB, | Performed by: STUDENT IN AN ORGANIZED HEALTH CARE EDUCATION/TRAINING PROGRAM

## 2023-11-14 PROCEDURE — 3288F PR FALLS RISK ASSESSMENT DOCUMENTED: ICD-10-PCS | Mod: HCNC,CPTII,S$GLB, | Performed by: STUDENT IN AN ORGANIZED HEALTH CARE EDUCATION/TRAINING PROGRAM

## 2023-11-14 PROCEDURE — 99213 OFFICE O/P EST LOW 20 MIN: CPT | Mod: HCNC,S$GLB,, | Performed by: STUDENT IN AN ORGANIZED HEALTH CARE EDUCATION/TRAINING PROGRAM

## 2023-11-14 PROCEDURE — 1159F MED LIST DOCD IN RCRD: CPT | Mod: HCNC,CPTII,S$GLB, | Performed by: STUDENT IN AN ORGANIZED HEALTH CARE EDUCATION/TRAINING PROGRAM

## 2023-11-14 PROCEDURE — 3044F PR MOST RECENT HEMOGLOBIN A1C LEVEL <7.0%: ICD-10-PCS | Mod: HCNC,CPTII,S$GLB, | Performed by: STUDENT IN AN ORGANIZED HEALTH CARE EDUCATION/TRAINING PROGRAM

## 2023-11-14 RX ORDER — BENZONATATE 200 MG/1
200 CAPSULE ORAL 3 TIMES DAILY PRN
Qty: 21 CAPSULE | Refills: 0 | Status: SHIPPED | OUTPATIENT
Start: 2023-11-14 | End: 2023-12-01 | Stop reason: SDUPTHER

## 2023-11-14 RX ORDER — FLUTICASONE PROPIONATE 50 MCG
SPRAY, SUSPENSION (ML) NASAL
Qty: 16 ML | Refills: 3 | Status: SHIPPED | OUTPATIENT
Start: 2023-11-14

## 2023-11-14 NOTE — TELEPHONE ENCOUNTER
Received notification about the patient's positive COVID-19 test result from today. Notified Dr. Roberts who said patient's appointments for PFTs, 6 MWT, and a clinic visit scheduled for tomorrow should be cancelled, and patient should follow up with Dr. Kvng Abrams in the pulmonary clinic on 12/18/23 as scheduled.  Called patient and her  answered. He asked his wife to come to the phone but she asked him to handle the call. Informed Mr. Floyd that his wife's appointments for tomorrow will be cancelled due to the positive COVID -19 infection. Reviewed Dr. Roberts's instructions for patient to follow up with Dr. Kvng Abrams in December as scheduled. Mr. Floyd asked if patient could be rescheduled to see Dr. Roberts once she is recovered from COVID-19, as Dr. Roberts was recommended to them by another provider and family friend. Instructed Mr. Floyd to call our office next week to discuss the plan. Mr. Floyd verbalized his understanding and all questions at this time were answered to his satisfaction.  Notified Dr. Roberts about the patient's request to be rescheduled to see her once she recovers from her COVID-19 infection. Dr. Roberts stated the patient's appointment with her can be rescheduled in ~ 2 weeks pending a negative COVID-19 test.

## 2023-11-14 NOTE — TELEPHONE ENCOUNTER
Received the plan of care from Dr. Roberts. Appointments for PFTs, a 6 MWT, and clinic visit with Dr. Roberts have been scheduled for tomorrow afternoon, 11/15/23, as instructed. Called patient to discuss the plan. She accepted the appointments as scheduled and stated she will be here tomorrow afternoon. She did not have any questions at this time.         ----- Message from Hina Roberts, DO sent at 11/14/2023 10:09 AM CST -----  I just spoke to Eric Rodriguez regarding this patient.  She is having difficulty and is requesting an earlier appointment than Dec when she's scheduled with Kvng Abrams.  Can you please schedule her to see me tomorrow afternoon?  Will need PFTs and a 6MWT.  We can keep the appointment with Kvng in Dec and he can take over then.  Thanks

## 2023-11-14 NOTE — TELEPHONE ENCOUNTER
Spoke with pt  and scheduled appt today at 10:320am with Dr. Jacinto per message below:    [facility] Staff (supporting Effie Olmedo MD)         11/13/23  7:17 PM  Dr. Olmedo, I have still not been diagnosed with something but I now have a head full of mucus and have a moreucusy chest cough and would appreciate being seen by someone soon

## 2023-11-14 NOTE — PROGRESS NOTES
Subjective:       Patient ID: Jessica Floyd is a 73 y.o. female.    Chief Complaint: URI    Sinus Problem  This is a new problem. The current episode started in the past 7 days (started 1 week ago, initially on 11/07, reached out for visit). The problem has been gradually worsening since onset. There has been no fever. Her pain is at a severity of 0/10. She is experiencing no pain. Associated symptoms include chills, congestion, coughing and sinus pressure. Pertinent negatives include no ear pain, headaches (sinus pressure), shortness of breath, sneezing or sore throat. (Ears feel full) Treatments tried: mucinex severe cough and cold medication. The treatment provided significant relief.     Initially started with nasal congestion, post nasal drip, and rhinorrhea. Clear rhinorrhea. Started becoming thicker and white. She reports it progressed to cough and coughing up mucus. Took mucinex severe cough and congestion medication, which helped with her cough. Recently thought to have COPD/emphysema in October, started on BREO and spiriva and is taking as prescribed without issues. She has not required home oxygen after hospital d/c, however when symptoms started 7 days ago, she has required oxygen intermittently with ambulating to restroom, she utilizes 1L. She is tolerating oral intake.     All of your core healthy metrics are met.      Social History     Social History Narrative    .  Has 3 grown daughters.         Family History   Problem Relation Age of Onset    Hypertension Mother     Aneurysm Mother     Hypertension Father     Alcohol abuse Father     Kidney disease Brother     Heart disease Brother     Gout Brother     No Known Problems Daughter     No Known Problems Daughter     No Known Problems Daughter        Current Outpatient Medications:     acetaminophen (TYLENOL) 650 MG TbSR, Take 1 tablet (650 mg total) by mouth every 6 to 8 hours as needed (pain (mild-moderate))., Disp: 120 tablet, Rfl: 0     allopurinoL (ZYLOPRIM) 100 MG tablet, Take 2 tablets (200 mg total) by mouth once daily., Disp: 180 tablet, Rfl: 3    amLODIPine (NORVASC) 5 MG tablet, TAKE 1 TABLET BY MOUTH EVERY DAY, Disp: 90 tablet, Rfl: 1    aspirin (ECOTRIN) 81 MG EC tablet, Take 81 mg by mouth once daily., Disp: , Rfl:     atorvastatin (LIPITOR) 80 MG tablet, TAKE 1 TABLET BY MOUTH EVERY DAY, Disp: 90 tablet, Rfl: 3    carvediloL (COREG) 6.25 MG tablet, Take 1 tablet (6.25 mg total) by mouth 2 (two) times daily with meals., Disp: 60 tablet, Rfl: 11    celecoxib (CELEBREX) 200 MG capsule, Take 1 capsule (200 mg total) by mouth as needed., Disp: , Rfl:     colchicine, gout, (COLCRYS) 0.6 mg tablet, Take 1 tablet (0.6 mg total) by mouth once daily. As needed for gout, Disp: 30 tablet, Rfl: 11    fluticasone furoate-vilanteroL (BREO) 100-25 mcg/dose diskus inhaler, Inhale 1 puff into the lungs once daily. Controller, Disp: 60 each, Rfl: 11    folic acid (FOLVITE) 1 MG tablet, Take 1 tablet (1 mg total) by mouth once daily., Disp: 90 tablet, Rfl: 3    hydroCHLOROthiazide (HYDRODIURIL) 25 MG tablet, Take 1 tablet (25 mg total) by mouth once daily., Disp: 90 tablet, Rfl: 3    melatonin (MELATIN) 3 mg tablet, Take 2 tablets (6 mg total) by mouth nightly as needed for Insomnia., Disp: , Rfl: 0    montelukast (SINGULAIR) 10 mg tablet, Take 1 tablet (10 mg total) by mouth every evening., Disp: 90 tablet, Rfl: 11    multivitamin (THERAGRAN) tablet, Take 1 tablet by mouth once daily., Disp: 90 tablet, Rfl: 3    pantoprazole (PROTONIX) 40 MG tablet, Take 1 tablet (40 mg total) by mouth once daily., Disp: 90 tablet, Rfl: 0    prenatal no122/iron/folic acid (PRENATAL MULTI ORAL), Take 1 tablet by mouth once daily., Disp: , Rfl:     QUEtiapine (SEROQUEL) 50 MG tablet, Take 1 tablet (50 mg total) by mouth every evening., Disp: 30 tablet, Rfl: 11    tiotropium bromide (SPIRIVA RESPIMAT) 2.5 mcg/actuation inhaler, Inhale 2 puffs into the lungs once daily.  "Controller, Disp: 4 g, Rfl: 2    benzonatate (TESSALON) 200 MG capsule, Take 1 capsule (200 mg total) by mouth 3 (three) times daily as needed for Cough., Disp: 21 capsule, Rfl: 0    fluticasone propionate (FLONASE) 50 mcg/actuation nasal spray, SPRAY 2 pumps INTO EACH NOSTRIL ONCE DAILY, Disp: 16 mL, Rfl: 3  No current facility-administered medications for this visit.    Facility-Administered Medications Ordered in Other Visits:     mupirocin 2 % ointment, , Nasal, On Call Procedure, Kang Diaz MD, Given at 10/25/23 1045    tranexamic acid (CYKLOKAPRON) 1,000 mg in sodium chloride 0.9 % 100 mL IVPB (MB+), 1,000 mg, Intravenous, Once, Kang Diaz MD    tranexamic acid (CYKLOKAPRON) 1,000 mg in sodium chloride 0.9 % 100 mL IVPB (MB+), 1,000 mg, Intravenous, Once, Kang Diaz MD    Review of Systems   Constitutional:  Positive for chills.   HENT:  Positive for congestion, postnasal drip, rhinorrhea and sinus pressure. Negative for ear pain, sinus pain, sneezing and sore throat.    Respiratory:  Positive for cough. Negative for chest tightness and shortness of breath.    Cardiovascular:  Negative for chest pain.   Gastrointestinal:  Negative for nausea and vomiting.   Neurological:  Negative for headaches (sinus pressure).       Objective:   BP (!) 144/86 (BP Location: Left arm)   Pulse 86   Ht 5' 4" (1.626 m)   Wt 54.1 kg (119 lb 4.3 oz)   SpO2 (!) 93%   BMI 20.47 kg/m²      Physical Exam  Vitals and nursing note reviewed.   Constitutional:       General: She is not in acute distress.     Appearance: Normal appearance. She is not ill-appearing, toxic-appearing or diaphoretic.   HENT:      Right Ear: External ear normal. Impacted cerumen: cerumen obstructing R TM.      Left Ear: Tympanic membrane, ear canal and external ear normal. There is no impacted cerumen.      Nose: Congestion and rhinorrhea present.      Mouth/Throat:      Pharynx: Oropharynx is clear.   Eyes:      General:         Right " eye: No discharge.         Left eye: No discharge.      Conjunctiva/sclera: Conjunctivae normal.   Cardiovascular:      Rate and Rhythm: Normal rate and regular rhythm.   Pulmonary:      Effort: Pulmonary effort is normal. No respiratory distress.      Breath sounds: Normal breath sounds. No wheezing.   Abdominal:      Palpations: Abdomen is soft.      Tenderness: There is no abdominal tenderness. There is no guarding.   Musculoskeletal:      Cervical back: Normal range of motion and neck supple.   Lymphadenopathy:      Cervical: No cervical adenopathy.   Neurological:      Mental Status: She is alert.   Psychiatric:         Behavior: Behavior normal.         Assessment & Plan   1. Upper respiratory tract infection, unspecified type  -COVID positive in clinic today.  -discussed nasal cleanse with distilled/saline. Folonase and continue OTV mucinex she started 1 day ago. Tylenol prn for pain/discomfort.  - benzonatate (TESSALON) 200 MG capsule; Take 1 capsule (200 mg total) by mouth 3 (three) times daily as needed for Cough.  Dispense: 21 capsule; Refill: 0    2. Cough, unspecified type  - benzonatate (TESSALON) 200 MG capsule; Take 1 capsule (200 mg total) by mouth 3 (three) times daily as needed for Cough.  Dispense: 21 capsule; Refill: 0    3. Viral upper respiratory tract infection  - fluticasone propionate (FLONASE) 50 mcg/actuation nasal spray; SPRAY 2 pumps INTO EACH NOSTRIL ONCE DAILY  Dispense: 16 mL; Refill: 3      Follow up if symptoms worsen or fail to improve.    Disclaimer:  This note may have been prepared using voice recognition software, it may have not been extensively proofed, as such there could be errors within the text such as sound alike errors.

## 2023-11-14 NOTE — TELEPHONE ENCOUNTER
----- Message from Ginette Alex sent at 11/14/2023  9:12 AM CST -----  Regarding: Return Call    Who Called:  Patient      Who Left Message for Patient:  Wan      Does the patient know what this is regarding?  About appointment today 11/14/2023        Best Call Back Number: [phone]       Additional Information: Patient is returning a call.  Please assist.

## 2023-11-14 NOTE — TELEPHONE ENCOUNTER
Cg didn't verify any information. He stated they already spoke with [facility] and pt was given an appt for 10:30 am.   Reason for Disposition   Caller has already spoken with the PCP (or office), and has no further questions    Protocols used: No Contact or Duplicate Contact Call-A-OH

## 2023-11-15 ENCOUNTER — PATIENT MESSAGE (OUTPATIENT)
Dept: PRIMARY CARE CLINIC | Facility: CLINIC | Age: 73
End: 2023-11-15
Payer: MEDICARE

## 2023-11-15 ENCOUNTER — HOSPITAL ENCOUNTER (INPATIENT)
Facility: HOSPITAL | Age: 73
LOS: 4 days | Discharge: HOME | DRG: 178 | End: 2023-11-19
Attending: STUDENT IN AN ORGANIZED HEALTH CARE EDUCATION/TRAINING PROGRAM | Admitting: HOSPITALIST
Payer: MEDICARE

## 2023-11-15 ENCOUNTER — PATIENT MESSAGE (OUTPATIENT)
Dept: INTERNAL MEDICINE | Facility: CLINIC | Age: 73
End: 2023-11-15
Payer: MEDICARE

## 2023-11-15 DIAGNOSIS — R06.02 SHORTNESS OF BREATH: ICD-10-CM

## 2023-11-15 DIAGNOSIS — U07.1 COVID-19: Primary | ICD-10-CM

## 2023-11-15 DIAGNOSIS — I70.0 AORTIC ATHEROSCLEROSIS: ICD-10-CM

## 2023-11-15 DIAGNOSIS — M81.0 OSTEOPOROSIS, UNSPECIFIED OSTEOPOROSIS TYPE, UNSPECIFIED PATHOLOGICAL FRACTURE PRESENCE: ICD-10-CM

## 2023-11-15 DIAGNOSIS — I10 PRIMARY HYPERTENSION: Chronic | ICD-10-CM

## 2023-11-15 DIAGNOSIS — J96.01 ACUTE HYPOXEMIC RESPIRATORY FAILURE: ICD-10-CM

## 2023-11-15 DIAGNOSIS — R07.9 CHEST PAIN: ICD-10-CM

## 2023-11-15 DIAGNOSIS — J96.01 ACUTE HYPOXIC RESPIRATORY FAILURE: ICD-10-CM

## 2023-11-15 LAB
ALBUMIN SERPL BCP-MCNC: 3.3 G/DL (ref 3.5–5.2)
ALLENS TEST: ABNORMAL
ALP SERPL-CCNC: 90 U/L (ref 55–135)
ALT SERPL W/O P-5'-P-CCNC: 17 U/L (ref 10–44)
ANION GAP SERPL CALC-SCNC: 12 MMOL/L (ref 8–16)
AST SERPL-CCNC: 28 U/L (ref 10–40)
BASOPHILS # BLD AUTO: 0.27 K/UL (ref 0–0.2)
BASOPHILS NFR BLD: 1.2 % (ref 0–1.9)
BILIRUB SERPL-MCNC: 0.8 MG/DL (ref 0.1–1)
BNP SERPL-MCNC: 391 PG/ML (ref 0–99)
BUN SERPL-MCNC: 5 MG/DL (ref 8–23)
CALCIUM SERPL-MCNC: 8.8 MG/DL (ref 8.7–10.5)
CHLORIDE SERPL-SCNC: 92 MMOL/L (ref 95–110)
CO2 SERPL-SCNC: 26 MMOL/L (ref 23–29)
CREAT SERPL-MCNC: 0.9 MG/DL (ref 0.5–1.4)
D DIMER PPP IA.FEU-MCNC: 1.71 MG/L FEU
DIFFERENTIAL METHOD: ABNORMAL
EOSINOPHIL # BLD AUTO: 0.7 K/UL (ref 0–0.5)
EOSINOPHIL NFR BLD: 2.9 % (ref 0–8)
ERYTHROCYTE [DISTWIDTH] IN BLOOD BY AUTOMATED COUNT: 18.3 % (ref 11.5–14.5)
EST. GFR  (NO RACE VARIABLE): >60 ML/MIN/1.73 M^2
GLUCOSE SERPL-MCNC: 154 MG/DL (ref 70–110)
HCO3 UR-SCNC: 30.5 MMOL/L (ref 24–28)
HCT VFR BLD AUTO: 37 % (ref 37–48.5)
HGB BLD-MCNC: 12.1 G/DL (ref 12–16)
IMM GRANULOCYTES # BLD AUTO: 0.56 K/UL (ref 0–0.04)
IMM GRANULOCYTES NFR BLD AUTO: 2.4 % (ref 0–0.5)
LYMPHOCYTES # BLD AUTO: 0.9 K/UL (ref 1–4.8)
LYMPHOCYTES NFR BLD: 4.1 % (ref 18–48)
MCH RBC QN AUTO: 34 PG (ref 27–31)
MCHC RBC AUTO-ENTMCNC: 32.7 G/DL (ref 32–36)
MCV RBC AUTO: 104 FL (ref 82–98)
MONOCYTES # BLD AUTO: 2.5 K/UL (ref 0.3–1)
MONOCYTES NFR BLD: 10.8 % (ref 4–15)
NEUTROPHILS # BLD AUTO: 18.1 K/UL (ref 1.8–7.7)
NEUTROPHILS NFR BLD: 78.6 % (ref 38–73)
NRBC BLD-RTO: 0 /100 WBC
PCO2 BLDA: 45.4 MMHG (ref 35–45)
PH SMN: 7.43 [PH] (ref 7.35–7.45)
PLATELET # BLD AUTO: 375 K/UL (ref 150–450)
PLATELET BLD QL SMEAR: ABNORMAL
PMV BLD AUTO: 10 FL (ref 9.2–12.9)
PO2 BLDA: 27 MMHG (ref 40–60)
POC BE: 6 MMOL/L
POC SATURATED O2: 51 % (ref 95–100)
POC TCO2: 32 MMOL/L (ref 24–29)
POCT GLUCOSE: 146 MG/DL (ref 70–110)
POTASSIUM SERPL-SCNC: 2.3 MMOL/L (ref 3.5–5.1)
PROT SERPL-MCNC: 7.2 G/DL (ref 6–8.4)
RBC # BLD AUTO: 3.56 M/UL (ref 4–5.4)
SAMPLE: ABNORMAL
SITE: ABNORMAL
SODIUM SERPL-SCNC: 130 MMOL/L (ref 136–145)
TROPONIN I SERPL DL<=0.01 NG/ML-MCNC: 0.02 NG/ML (ref 0–0.03)
WBC # BLD AUTO: 23.04 K/UL (ref 3.9–12.7)

## 2023-11-15 PROCEDURE — 84145 PROCALCITONIN (PCT): CPT | Mod: HCNC

## 2023-11-15 PROCEDURE — 82962 GLUCOSE BLOOD TEST: CPT | Mod: HCNC

## 2023-11-15 PROCEDURE — 80053 COMPREHEN METABOLIC PANEL: CPT | Mod: HCNC

## 2023-11-15 PROCEDURE — 63600175 PHARM REV CODE 636 W HCPCS: Mod: HCNC

## 2023-11-15 PROCEDURE — 94761 N-INVAS EAR/PLS OXIMETRY MLT: CPT | Mod: HCNC,XB

## 2023-11-15 PROCEDURE — 99900035 HC TECH TIME PER 15 MIN (STAT): Mod: HCNC

## 2023-11-15 PROCEDURE — 85025 COMPLETE CBC W/AUTO DIFF WBC: CPT | Mod: HCNC

## 2023-11-15 PROCEDURE — 12000002 HC ACUTE/MED SURGE SEMI-PRIVATE ROOM: Mod: HCNC

## 2023-11-15 PROCEDURE — 93010 EKG 12-LEAD: ICD-10-PCS | Mod: HCNC,,, | Performed by: INTERNAL MEDICINE

## 2023-11-15 PROCEDURE — 27000221 HC OXYGEN, UP TO 24 HOURS: Mod: HCNC

## 2023-11-15 PROCEDURE — 93005 ELECTROCARDIOGRAM TRACING: CPT | Mod: HCNC

## 2023-11-15 PROCEDURE — 25000003 PHARM REV CODE 250: Mod: HCNC

## 2023-11-15 PROCEDURE — 83880 ASSAY OF NATRIURETIC PEPTIDE: CPT | Mod: HCNC

## 2023-11-15 PROCEDURE — 93010 ELECTROCARDIOGRAM REPORT: CPT | Mod: HCNC,,, | Performed by: INTERNAL MEDICINE

## 2023-11-15 PROCEDURE — 27000207 HC ISOLATION: Mod: HCNC

## 2023-11-15 PROCEDURE — 84484 ASSAY OF TROPONIN QUANT: CPT | Mod: HCNC

## 2023-11-15 PROCEDURE — 99285 EMERGENCY DEPT VISIT HI MDM: CPT | Mod: 25,HCNC

## 2023-11-15 PROCEDURE — 82803 BLOOD GASES ANY COMBINATION: CPT | Mod: HCNC

## 2023-11-15 PROCEDURE — 85379 FIBRIN DEGRADATION QUANT: CPT | Mod: HCNC

## 2023-11-15 RX ORDER — SIMETHICONE 80 MG
1 TABLET,CHEWABLE ORAL 4 TIMES DAILY PRN
Status: DISCONTINUED | OUTPATIENT
Start: 2023-11-15 | End: 2023-11-19 | Stop reason: HOSPADM

## 2023-11-15 RX ORDER — BISACODYL 10 MG
10 SUPPOSITORY, RECTAL RECTAL DAILY PRN
Status: DISCONTINUED | OUTPATIENT
Start: 2023-11-15 | End: 2023-11-19 | Stop reason: HOSPADM

## 2023-11-15 RX ORDER — ACETAMINOPHEN 500 MG
1000 TABLET ORAL EVERY 8 HOURS PRN
Status: DISCONTINUED | OUTPATIENT
Start: 2023-11-15 | End: 2023-11-19 | Stop reason: HOSPADM

## 2023-11-15 RX ORDER — TALC
6 POWDER (GRAM) TOPICAL NIGHTLY PRN
Status: DISCONTINUED | OUTPATIENT
Start: 2023-11-15 | End: 2023-11-16

## 2023-11-15 RX ORDER — MAG HYDROX/ALUMINUM HYD/SIMETH 200-200-20
30 SUSPENSION, ORAL (FINAL DOSE FORM) ORAL 4 TIMES DAILY PRN
Status: DISCONTINUED | OUTPATIENT
Start: 2023-11-15 | End: 2023-11-19 | Stop reason: HOSPADM

## 2023-11-15 RX ORDER — NALOXONE HCL 0.4 MG/ML
0.02 VIAL (ML) INJECTION
Status: DISCONTINUED | OUTPATIENT
Start: 2023-11-15 | End: 2023-11-19 | Stop reason: HOSPADM

## 2023-11-15 RX ORDER — POLYETHYLENE GLYCOL 3350 17 G/17G
17 POWDER, FOR SOLUTION ORAL 2 TIMES DAILY PRN
Status: DISCONTINUED | OUTPATIENT
Start: 2023-11-15 | End: 2023-11-19 | Stop reason: HOSPADM

## 2023-11-15 RX ORDER — ENOXAPARIN SODIUM 100 MG/ML
40 INJECTION SUBCUTANEOUS EVERY 24 HOURS
Status: DISCONTINUED | OUTPATIENT
Start: 2023-11-16 | End: 2023-11-19 | Stop reason: HOSPADM

## 2023-11-15 RX ORDER — IBUPROFEN 200 MG
24 TABLET ORAL
Status: DISCONTINUED | OUTPATIENT
Start: 2023-11-15 | End: 2023-11-19 | Stop reason: HOSPADM

## 2023-11-15 RX ORDER — ACETAMINOPHEN 325 MG/1
650 TABLET ORAL EVERY 4 HOURS PRN
Status: DISCONTINUED | OUTPATIENT
Start: 2023-11-15 | End: 2023-11-19 | Stop reason: HOSPADM

## 2023-11-15 RX ORDER — IBUPROFEN 200 MG
16 TABLET ORAL
Status: DISCONTINUED | OUTPATIENT
Start: 2023-11-15 | End: 2023-11-19 | Stop reason: HOSPADM

## 2023-11-15 RX ORDER — GLUCAGON 1 MG
1 KIT INJECTION
Status: DISCONTINUED | OUTPATIENT
Start: 2023-11-15 | End: 2023-11-19 | Stop reason: HOSPADM

## 2023-11-15 RX ORDER — DEXAMETHASONE SODIUM PHOSPHATE 4 MG/ML
6 INJECTION, SOLUTION INTRA-ARTICULAR; INTRALESIONAL; INTRAMUSCULAR; INTRAVENOUS; SOFT TISSUE EVERY 24 HOURS
Status: DISCONTINUED | OUTPATIENT
Start: 2023-11-15 | End: 2023-11-18

## 2023-11-15 RX ORDER — SODIUM CHLORIDE 0.9 % (FLUSH) 0.9 %
5 SYRINGE (ML) INJECTION
Status: DISCONTINUED | OUTPATIENT
Start: 2023-11-15 | End: 2023-11-19 | Stop reason: HOSPADM

## 2023-11-15 RX ADMIN — REMDESIVIR 200 MG: 100 INJECTION, POWDER, LYOPHILIZED, FOR SOLUTION INTRAVENOUS at 11:11

## 2023-11-15 RX ADMIN — POTASSIUM BICARBONATE 40 MEQ: 391 TABLET, EFFERVESCENT ORAL at 11:11

## 2023-11-15 RX ADMIN — POTASSIUM BICARBONATE 40 MEQ: 391 TABLET, EFFERVESCENT ORAL at 09:11

## 2023-11-15 RX ADMIN — DEXAMETHASONE SODIUM PHOSPHATE 6 MG: 4 INJECTION INTRA-ARTICULAR; INTRALESIONAL; INTRAMUSCULAR; INTRAVENOUS; SOFT TISSUE at 11:11

## 2023-11-16 PROBLEM — F41.9 SEVERE ANXIETY: Chronic | Status: ACTIVE | Noted: 2023-10-28

## 2023-11-16 PROBLEM — M10.9 GOUT: Chronic | Status: ACTIVE | Noted: 2023-10-16

## 2023-11-16 PROBLEM — J43.9 PULMONARY EMPHYSEMA: Chronic | Status: ACTIVE | Noted: 2023-11-16

## 2023-11-16 PROBLEM — J43.9 PULMONARY EMPHYSEMA: Status: ACTIVE | Noted: 2023-11-16

## 2023-11-16 PROBLEM — K21.9 GASTROESOPHAGEAL REFLUX DISEASE WITHOUT ESOPHAGITIS: Chronic | Status: ACTIVE | Noted: 2017-03-20

## 2023-11-16 LAB
ALBUMIN SERPL BCP-MCNC: 2.8 G/DL (ref 3.5–5.2)
ALP SERPL-CCNC: 76 U/L (ref 55–135)
ALT SERPL W/O P-5'-P-CCNC: 15 U/L (ref 10–44)
ANION GAP SERPL CALC-SCNC: 11 MMOL/L (ref 8–16)
AST SERPL-CCNC: 25 U/L (ref 10–40)
BASOPHILS # BLD AUTO: 0.02 K/UL (ref 0–0.2)
BASOPHILS NFR BLD: 0.1 % (ref 0–1.9)
BILIRUB SERPL-MCNC: 0.5 MG/DL (ref 0.1–1)
BUN SERPL-MCNC: 10 MG/DL (ref 8–23)
CALCIUM SERPL-MCNC: 8.7 MG/DL (ref 8.7–10.5)
CHLORIDE SERPL-SCNC: 92 MMOL/L (ref 95–110)
CO2 SERPL-SCNC: 30 MMOL/L (ref 23–29)
CREAT SERPL-MCNC: 0.7 MG/DL (ref 0.5–1.4)
DIFFERENTIAL METHOD: ABNORMAL
EOSINOPHIL # BLD AUTO: 0.1 K/UL (ref 0–0.5)
EOSINOPHIL NFR BLD: 0.3 % (ref 0–8)
ERYTHROCYTE [DISTWIDTH] IN BLOOD BY AUTOMATED COUNT: 19 % (ref 11.5–14.5)
EST. GFR  (NO RACE VARIABLE): >60 ML/MIN/1.73 M^2
GLUCOSE SERPL-MCNC: 174 MG/DL (ref 70–110)
HCT VFR BLD AUTO: 33.7 % (ref 37–48.5)
HGB BLD-MCNC: 11.5 G/DL (ref 12–16)
IMM GRANULOCYTES # BLD AUTO: 0.53 K/UL (ref 0–0.04)
IMM GRANULOCYTES NFR BLD AUTO: 2.6 % (ref 0–0.5)
LYMPHOCYTES # BLD AUTO: 1.3 K/UL (ref 1–4.8)
LYMPHOCYTES NFR BLD: 6.5 % (ref 18–48)
MCH RBC QN AUTO: 35.1 PG (ref 27–31)
MCHC RBC AUTO-ENTMCNC: 34.1 G/DL (ref 32–36)
MCV RBC AUTO: 103 FL (ref 82–98)
MONOCYTES # BLD AUTO: 1 K/UL (ref 0.3–1)
MONOCYTES NFR BLD: 4.7 % (ref 4–15)
NEUTROPHILS # BLD AUTO: 17.5 K/UL (ref 1.8–7.7)
NEUTROPHILS NFR BLD: 85.8 % (ref 38–73)
NRBC BLD-RTO: 0 /100 WBC
OSMOLALITY SERPL: 288 MOSM/KG (ref 275–295)
OSMOLALITY UR: 242 MOSM/KG (ref 50–1200)
PLATELET # BLD AUTO: 341 K/UL (ref 150–450)
PLATELET BLD QL SMEAR: ABNORMAL
PMV BLD AUTO: 10.5 FL (ref 9.2–12.9)
POCT GLUCOSE: 138 MG/DL (ref 70–110)
POCT GLUCOSE: 153 MG/DL (ref 70–110)
POCT GLUCOSE: 157 MG/DL (ref 70–110)
POCT GLUCOSE: 179 MG/DL (ref 70–110)
POCT GLUCOSE: 193 MG/DL (ref 70–110)
POTASSIUM SERPL-SCNC: 2.6 MMOL/L (ref 3.5–5.1)
PROCALCITONIN SERPL IA-MCNC: 0.17 NG/ML
PROT SERPL-MCNC: 6.6 G/DL (ref 6–8.4)
RBC # BLD AUTO: 3.28 M/UL (ref 4–5.4)
SODIUM SERPL-SCNC: 133 MMOL/L (ref 136–145)
SODIUM UR-SCNC: 44 MMOL/L (ref 20–250)
WBC # BLD AUTO: 20.43 K/UL (ref 3.9–12.7)

## 2023-11-16 PROCEDURE — 85025 COMPLETE CBC W/AUTO DIFF WBC: CPT | Mod: HCNC

## 2023-11-16 PROCEDURE — 94761 N-INVAS EAR/PLS OXIMETRY MLT: CPT | Mod: HCNC

## 2023-11-16 PROCEDURE — 27000207 HC ISOLATION: Mod: HCNC

## 2023-11-16 PROCEDURE — 25000242 PHARM REV CODE 250 ALT 637 W/ HCPCS: Mod: HCNC

## 2023-11-16 PROCEDURE — 94640 AIRWAY INHALATION TREATMENT: CPT | Mod: HCNC

## 2023-11-16 PROCEDURE — 25000003 PHARM REV CODE 250: Mod: HCNC

## 2023-11-16 PROCEDURE — 25500020 PHARM REV CODE 255: Mod: HCNC | Performed by: STUDENT IN AN ORGANIZED HEALTH CARE EDUCATION/TRAINING PROGRAM

## 2023-11-16 PROCEDURE — 83930 ASSAY OF BLOOD OSMOLALITY: CPT | Mod: HCNC

## 2023-11-16 PROCEDURE — 84300 ASSAY OF URINE SODIUM: CPT | Mod: HCNC

## 2023-11-16 PROCEDURE — 80053 COMPREHEN METABOLIC PANEL: CPT | Mod: HCNC

## 2023-11-16 PROCEDURE — 25000003 PHARM REV CODE 250: Mod: HCNC | Performed by: HOSPITALIST

## 2023-11-16 PROCEDURE — 27000221 HC OXYGEN, UP TO 24 HOURS: Mod: HCNC

## 2023-11-16 PROCEDURE — 20600001 HC STEP DOWN PRIVATE ROOM: Mod: HCNC

## 2023-11-16 PROCEDURE — 63600175 PHARM REV CODE 636 W HCPCS: Mod: JZ,TB,HCNC

## 2023-11-16 PROCEDURE — 99900035 HC TECH TIME PER 15 MIN (STAT): Mod: HCNC

## 2023-11-16 PROCEDURE — 83935 ASSAY OF URINE OSMOLALITY: CPT | Mod: HCNC

## 2023-11-16 PROCEDURE — 36415 COLL VENOUS BLD VENIPUNCTURE: CPT | Mod: HCNC

## 2023-11-16 RX ORDER — GUAIFENESIN 100 MG/5ML
400 SOLUTION ORAL EVERY 4 HOURS PRN
Status: DISCONTINUED | OUTPATIENT
Start: 2023-11-16 | End: 2023-11-19 | Stop reason: HOSPADM

## 2023-11-16 RX ORDER — ASPIRIN 81 MG/1
81 TABLET ORAL DAILY
Status: DISCONTINUED | OUTPATIENT
Start: 2023-11-16 | End: 2023-11-19 | Stop reason: HOSPADM

## 2023-11-16 RX ORDER — PANTOPRAZOLE SODIUM 40 MG/1
40 TABLET, DELAYED RELEASE ORAL DAILY
Status: DISCONTINUED | OUTPATIENT
Start: 2023-11-16 | End: 2023-11-19 | Stop reason: HOSPADM

## 2023-11-16 RX ORDER — ALLOPURINOL 100 MG/1
200 TABLET ORAL DAILY
Status: DISCONTINUED | OUTPATIENT
Start: 2023-11-16 | End: 2023-11-19 | Stop reason: HOSPADM

## 2023-11-16 RX ORDER — SODIUM CHLORIDE 0.9 % (FLUSH) 0.9 %
10 SYRINGE (ML) INJECTION
Status: DISCONTINUED | OUTPATIENT
Start: 2023-11-16 | End: 2023-11-19 | Stop reason: HOSPADM

## 2023-11-16 RX ORDER — BENZONATATE 100 MG/1
200 CAPSULE ORAL 3 TIMES DAILY PRN
Status: DISCONTINUED | OUTPATIENT
Start: 2023-11-16 | End: 2023-11-19 | Stop reason: HOSPADM

## 2023-11-16 RX ORDER — TALC
6 POWDER (GRAM) TOPICAL NIGHTLY PRN
Status: DISCONTINUED | OUTPATIENT
Start: 2023-11-16 | End: 2023-11-19 | Stop reason: HOSPADM

## 2023-11-16 RX ORDER — ATORVASTATIN CALCIUM 40 MG/1
80 TABLET, FILM COATED ORAL DAILY
Status: DISCONTINUED | OUTPATIENT
Start: 2023-11-16 | End: 2023-11-19 | Stop reason: HOSPADM

## 2023-11-16 RX ORDER — FLUTICASONE FUROATE AND VILANTEROL 100; 25 UG/1; UG/1
1 POWDER RESPIRATORY (INHALATION) DAILY
Status: DISCONTINUED | OUTPATIENT
Start: 2023-11-16 | End: 2023-11-19 | Stop reason: HOSPADM

## 2023-11-16 RX ORDER — FLUTICASONE PROPIONATE 50 MCG
2 SPRAY, SUSPENSION (ML) NASAL DAILY
Status: DISCONTINUED | OUTPATIENT
Start: 2023-11-16 | End: 2023-11-19 | Stop reason: HOSPADM

## 2023-11-16 RX ORDER — QUETIAPINE FUMARATE 25 MG/1
50 TABLET, FILM COATED ORAL NIGHTLY
Status: DISCONTINUED | OUTPATIENT
Start: 2023-11-16 | End: 2023-11-19 | Stop reason: HOSPADM

## 2023-11-16 RX ORDER — AMLODIPINE BESYLATE 5 MG/1
5 TABLET ORAL DAILY
Status: DISCONTINUED | OUTPATIENT
Start: 2023-11-16 | End: 2023-11-19 | Stop reason: HOSPADM

## 2023-11-16 RX ORDER — POTASSIUM CHLORIDE 20 MEQ/1
40 TABLET, EXTENDED RELEASE ORAL 2 TIMES DAILY
Status: DISCONTINUED | OUTPATIENT
Start: 2023-11-16 | End: 2023-11-16

## 2023-11-16 RX ORDER — GUAIFENESIN 600 MG/1
600 TABLET, EXTENDED RELEASE ORAL 2 TIMES DAILY
Status: DISCONTINUED | OUTPATIENT
Start: 2023-11-16 | End: 2023-11-16

## 2023-11-16 RX ORDER — ALBUTEROL SULFATE 90 UG/1
2 AEROSOL, METERED RESPIRATORY (INHALATION) EVERY 6 HOURS PRN
Status: DISCONTINUED | OUTPATIENT
Start: 2023-11-16 | End: 2023-11-19 | Stop reason: HOSPADM

## 2023-11-16 RX ORDER — FOLIC ACID 1 MG/1
1 TABLET ORAL DAILY
Status: DISCONTINUED | OUTPATIENT
Start: 2023-11-16 | End: 2023-11-19 | Stop reason: HOSPADM

## 2023-11-16 RX ORDER — CARVEDILOL 3.12 MG/1
6.25 TABLET ORAL 2 TIMES DAILY WITH MEALS
Status: DISCONTINUED | OUTPATIENT
Start: 2023-11-16 | End: 2023-11-19 | Stop reason: HOSPADM

## 2023-11-16 RX ORDER — MONTELUKAST SODIUM 10 MG/1
10 TABLET ORAL NIGHTLY
Status: DISCONTINUED | OUTPATIENT
Start: 2023-11-16 | End: 2023-11-19 | Stop reason: HOSPADM

## 2023-11-16 RX ADMIN — FLUTICASONE PROPIONATE 100 MCG: 50 SPRAY, METERED NASAL at 09:11

## 2023-11-16 RX ADMIN — BENZONATATE 200 MG: 100 CAPSULE ORAL at 11:11

## 2023-11-16 RX ADMIN — CARVEDILOL 6.25 MG: 3.12 TABLET, FILM COATED ORAL at 04:11

## 2023-11-16 RX ADMIN — ASPIRIN 81 MG: 81 TABLET, COATED ORAL at 09:11

## 2023-11-16 RX ADMIN — POTASSIUM BICARBONATE 50 MEQ: 978 TABLET, EFFERVESCENT ORAL at 08:11

## 2023-11-16 RX ADMIN — ACETAMINOPHEN 1000 MG: 500 TABLET ORAL at 01:11

## 2023-11-16 RX ADMIN — THERA TABS 1 TABLET: TAB at 09:11

## 2023-11-16 RX ADMIN — GUAIFENESIN 400 MG: 200 SOLUTION ORAL at 06:11

## 2023-11-16 RX ADMIN — PANTOPRAZOLE SODIUM 40 MG: 40 TABLET, DELAYED RELEASE ORAL at 09:11

## 2023-11-16 RX ADMIN — GUAIFENESIN 600 MG: 600 TABLET, EXTENDED RELEASE ORAL at 01:11

## 2023-11-16 RX ADMIN — REMDESIVIR 100 MG: 100 INJECTION, POWDER, LYOPHILIZED, FOR SOLUTION INTRAVENOUS at 11:11

## 2023-11-16 RX ADMIN — GUAIFENESIN 400 MG: 200 SOLUTION ORAL at 11:11

## 2023-11-16 RX ADMIN — Medication 6 MG: at 08:11

## 2023-11-16 RX ADMIN — POTASSIUM BICARBONATE 50 MEQ: 978 TABLET, EFFERVESCENT ORAL at 11:11

## 2023-11-16 RX ADMIN — FOLIC ACID 1 MG: 1 TABLET ORAL at 09:11

## 2023-11-16 RX ADMIN — Medication 6 MG: at 01:11

## 2023-11-16 RX ADMIN — BENZONATATE 200 MG: 100 CAPSULE ORAL at 06:11

## 2023-11-16 RX ADMIN — ALLOPURINOL 200 MG: 100 TABLET ORAL at 09:11

## 2023-11-16 RX ADMIN — TIOTROPIUM BROMIDE INHALATION SPRAY 2 PUFF: 3.12 SPRAY, METERED RESPIRATORY (INHALATION) at 08:11

## 2023-11-16 RX ADMIN — IOHEXOL 75 ML: 350 INJECTION, SOLUTION INTRAVENOUS at 12:11

## 2023-11-16 RX ADMIN — BENZONATATE 200 MG: 100 CAPSULE ORAL at 01:11

## 2023-11-16 RX ADMIN — CARVEDILOL 6.25 MG: 3.12 TABLET, FILM COATED ORAL at 09:11

## 2023-11-16 RX ADMIN — FLUTICASONE FUROATE AND VILANTEROL TRIFENATATE 1 PUFF: 100; 25 POWDER RESPIRATORY (INHALATION) at 08:11

## 2023-11-16 RX ADMIN — MONTELUKAST 10 MG: 10 TABLET, FILM COATED ORAL at 01:11

## 2023-11-16 RX ADMIN — ENOXAPARIN SODIUM 40 MG: 40 INJECTION SUBCUTANEOUS at 04:11

## 2023-11-16 RX ADMIN — QUETIAPINE FUMARATE 50 MG: 25 TABLET ORAL at 08:11

## 2023-11-16 RX ADMIN — ATORVASTATIN CALCIUM 80 MG: 40 TABLET, FILM COATED ORAL at 09:11

## 2023-11-16 RX ADMIN — SODIUM CHLORIDE 500 ML: 9 INJECTION, SOLUTION INTRAVENOUS at 02:11

## 2023-11-16 RX ADMIN — AMLODIPINE BESYLATE 5 MG: 5 TABLET ORAL at 09:11

## 2023-11-16 RX ADMIN — GUAIFENESIN 400 MG: 200 SOLUTION ORAL at 01:11

## 2023-11-16 RX ADMIN — MONTELUKAST 10 MG: 10 TABLET, FILM COATED ORAL at 08:11

## 2023-11-16 RX ADMIN — QUETIAPINE FUMARATE 50 MG: 25 TABLET ORAL at 01:11

## 2023-11-16 RX ADMIN — GUAIFENESIN 600 MG: 600 TABLET, EXTENDED RELEASE ORAL at 09:11

## 2023-11-16 NOTE — CARE UPDATE
Evaluation of Early Detection of Sepsis Risk     Patient Name: Jessica Floyd  MRN:  [id number]  Primary Care Team: [facility] D  Date of Admission:  11/15/2023     Principal Problem:  COVID-19   Date of Review: 11/16/2023    Patient reviewed for elevated sepsis risk score. Sepsis Screen Negative  Will continue to monitor for signs and symptoms of new or worsening infection and screen as needed. Please notify Rapid Response Team for any hypotension or decompensation.    Sepsis Screen Results     IP Sepsis Screen (most recent)       Sepsis Screen (IP) - 11/16/23 1052       Is the patient's history or complaint suggestive of a possible infection? Yes  -KW    Are there at least two of the following signs and symptoms present? Yes  -KW    Sepsis signs/symptoms - Tachypnea Tachypnea     >20  -KW    Sepsis signs/symptoms - WBC WBC < 4,000 or WBC > 12,000  -KW    Are any of the following organ dysfunction criteria present and not considered to be due to a chronic condition? No  -KW    Initiate Sepsis Protocol No  -KW              User Key  (r) = Recorded By, (t) = Taken By, (c) = Cosigned By      Initials Name    Simi Schmitz DNP Kacey C Wuertz, DNP  Unit Based [facility]   
FAMILY HISTORY:  Mother  Still living? Unknown  Family history of diabetes mellitus type II, Age at diagnosis: Age Unknown

## 2023-11-16 NOTE — ED NOTES
Telemetry Verification   Patient placed on Telemetry Box  Verified with War Room  Box # [id number]   Monitor Tech  Obed   Rate  104   Rhythm  Sinus tach

## 2023-11-16 NOTE — ED PROVIDER NOTES
Encounter Date: 11/15/2023       History     Chief Complaint   Patient presents with    Shortness of Breath     85% on 2 L on EMS arrival. Satting 98% currently on CPAP. Dx covid yesterday. Seen week ago here for SOB.     73-year-old female with a past medical history of CAD, dyslipidemia, ESBL UTI, subarachnoid hemorrhage and was recently diagnosed with COVID yesterday presents with a chief complaint of shortness of breath.  The patient was found by EMS at home oxygen saturations at 85% on 2L NC.  She was given a DuoNeb and brought in on BiPAP.  Patient does not have prior history of lung disease and was recently placed on home O2 as needed.    The history is provided by the patient and the EMS personnel. No  was used.     Review of patient's allergies indicates:  No Known Allergies  Past Medical History:   Diagnosis Date    Allergic rhinitis     Amblyopia     Carotid stenosis     Coronary atherosclerosis     Outside [facility] 6/2014: 20% mLAD, 50% mCx, 20%, mRCA    Dyslipidemia     ESBL (extended spectrum beta-lactamase) producing bacteria infection     UTI    Hypertension     Nausea and vomiting 05/26/2017    SAH (subarachnoid hemorrhage) 08/24/2016 1999, s/p clipping    Subarachnoid hemorrhage due to ruptured aneurysm 1999     Past Surgical History:   Procedure Laterality Date    ADENOIDECTOMY      ANTERIOR CRUCIATE LIGAMENT REPAIR  2012    APPENDECTOMY      CATARACT EXTRACTION W/  INTRAOCULAR LENS IMPLANT Right 11/07/2022    Procedure: EXTRACTION, CATARACT, WITH IOL INSERTION;  Surgeon: Higinio Cristina MD;  Location: [facility] OR;  Service: Ophthalmology;  Laterality: Right;    CATARACT EXTRACTION W/  INTRAOCULAR LENS IMPLANT Left 11/21/2022    Procedure: EXTRACTION, CATARACT, WITH IOL INSERTION;  Surgeon: Higinio Cristina MD;  Location: [facility] OR;  Service: Ophthalmology;  Laterality: Left;    CEREBRAL ANEURYSM REPAIR  1999    clip    DENTAL SURGERY      EYE SURGERY Bilateral     cataract removal and  lens implants    HIP ARTHROPLASTY Left 2023    Procedure: TOTAL HIP ARTHROPLASTY;  Surgeon: Juan Wan MD;  Location: [facility] OR [address] [place] [address];  Service: Orthopedics;  Laterality: Left;    TONSILLECTOMY       Family History   Problem Relation Age of Onset    Hypertension Mother     Aneurysm Mother     Hypertension Father     Alcohol abuse Father     Kidney disease Brother     Heart disease Brother     Gout Brother     No Known Problems Daughter     No Known Problems Daughter     No Known Problems Daughter      Social History     Tobacco Use    Smoking status: Former     Current packs/day: 0.00     Average packs/day: 0.5 packs/day for 20.0 years (10.0 ttl pk-yrs)     Types: Cigarettes     Start date: 1979     Quit date: 1999     Years since quittin.8     Passive exposure: Past    Smokeless tobacco: Never   Substance Use Topics    Alcohol use: Yes     Alcohol/week: 7.0 standard drinks of alcohol     Types: 7 Glasses of wine per week     Comment: One glass of wine prior to dinner    Drug use: No     Review of Systems    Physical Exam     Initial Vitals [11/15/23 1934]   BP Pulse Resp Temp SpO2   (!) 140/82 110 20 98 °F (36.7 °C) 98 %      MAP       --         Physical Exam    Nursing note and vitals reviewed.  Constitutional: She appears well-developed and well-nourished. She is not diaphoretic. No distress.   HENT:   Head: Normocephalic and atraumatic.   Eyes: EOM are normal. Pupils are equal, round, and reactive to light. Right eye exhibits no discharge. Left eye exhibits no discharge.   Neck: Neck supple.   Cardiovascular:  Normal rate, normal heart sounds and intact distal pulses.           Frequent PACs   Pulmonary/Chest: Breath sounds normal. No respiratory distress. She has no wheezes. She has no rhonchi. She has no rales. She exhibits no tenderness.   Abdominal: Abdomen is soft. There is no abdominal tenderness. There is no rebound and no guarding.   Musculoskeletal:         General: No  tenderness or edema. Normal range of motion.      Cervical back: Neck supple.     Neurological: She is alert and oriented to person, place, and time. She has normal strength.   Skin: Skin is warm and dry. Capillary refill takes less than 2 seconds. No rash noted. No erythema. No pallor.   Psychiatric: She has a normal mood and affect. Her behavior is normal. Judgment and thought content normal.         ED Course   Procedures  Labs Reviewed   CBC W/ AUTO DIFFERENTIAL - Abnormal; Notable for the following components:       Result Value    WBC 23.04 (*)     RBC 3.56 (*)      (*)     MCH 34.0 (*)     RDW 18.3 (*)     Immature Granulocytes 2.4 (*)     Gran # (ANC) 18.1 (*)     Immature Grans (Abs) 0.56 (*)     Lymph # 0.9 (*)     Mono # 2.5 (*)     Eos # 0.7 (*)     Baso # 0.27 (*)     Gran % 78.6 (*)     Lymph % 4.1 (*)     All other components within normal limits   COMPREHENSIVE METABOLIC PANEL - Abnormal; Notable for the following components:    Sodium 130 (*)     Potassium 2.3 (*)     Chloride 92 (*)     Glucose 154 (*)     BUN 5 (*)     Albumin 3.3 (*)     All other components within normal limits   B-TYPE NATRIURETIC PEPTIDE - Abnormal; Notable for the following components:     (*)     All other components within normal limits   D DIMER, QUANTITATIVE - Abnormal; Notable for the following components:    D-Dimer 1.71 (*)     All other components within normal limits   ISTAT PROCEDURE - Abnormal; Notable for the following components:    POC PCO2 45.4 (*)     POC PO2 27 (*)     POC HCO3 30.5 (*)     POC BE 6 (*)     POC TCO2 32 (*)     All other components within normal limits   POCT GLUCOSE - Abnormal; Notable for the following components:    POCT Glucose 146 (*)     All other components within normal limits   TROPONIN I   PROCALCITONIN   POCT GLUCOSE MONITORING CONTINUOUS          Imaging Results              X-Ray Chest 1 View (Final result)  Result time 11/15/23 20:40:01      Final result by Allie  Armani OCHOA MD (11/15/23 20:40:01)                   Impression:      No acute findings in the chest.      Electronically signed by: Armani Kelley MD  Date:    11/15/2023  Time:    20:40               Narrative:    EXAMINATION:  XR CHEST 1 VIEW    CLINICAL HISTORY:  Shortness of breath    TECHNIQUE:  Single frontal view of the chest was performed.    COMPARISON:  10/28/2023.    FINDINGS:  Increased markings in the lungs, similar to prior.    No consolidation, pleural effusion or pneumothorax.    Cardiomediastinal silhouette is unremarkable.                                       Medications   potassium bicarbonate disintegrating tablet 40 mEq (has no administration in time range)   remdesivir 200 mg in sodium chloride 0.9% 250 mL infusion (has no administration in time range)     Followed by   remdesivir 100 mg in sodium chloride 0.9% 250 mL infusion (has no administration in time range)   dexAMETHasone injection 6 mg (has no administration in time range)   sodium chloride 0.9% flush 5 mL (has no administration in time range)   melatonin tablet 6 mg (has no administration in time range)   polyethylene glycol packet 17 g (has no administration in time range)   bisacodyL suppository 10 mg (has no administration in time range)   simethicone chewable tablet 80 mg (has no administration in time range)   aluminum-magnesium hydroxide-simethicone 200-200-20 mg/5 mL suspension 30 mL (has no administration in time range)   acetaminophen tablet 650 mg (has no administration in time range)   acetaminophen tablet 1,000 mg (has no administration in time range)   naloxone 0.4 mg/mL injection 0.02 mg (has no administration in time range)   glucose chewable tablet 16 g (has no administration in time range)   glucose chewable tablet 24 g (has no administration in time range)   glucagon (human recombinant) injection 1 mg (has no administration in time range)   enoxaparin injection 40 mg (has no administration in time range)    dextrose 10% bolus 125 mL 125 mL (has no administration in time range)   dextrose 10% bolus 250 mL 250 mL (has no administration in time range)   potassium bicarbonate disintegrating tablet 40 mEq (40 mEq Oral Given 11/15/23 2144)     Medical Decision Making  See ED course for remainder of care    Amount and/or Complexity of Data Reviewed  Independent Historian: spouse  Labs: ordered. Decision-making details documented in ED Course.  Radiology: ordered.  ECG/medicine tests:  Decision-making details documented in ED Course.    Risk  Prescription drug management.  Decision regarding hospitalization.               ED Course as of 11/15/23 2315   Wed Nov 15, 2023   2017 EKG 12-lead  SR at 89 bpm, incomplete RBBB, no STEMI [BP]   2256 73-year-old female in no acute distress.  Patient was transitioned to nasal cannula when she got here because she did not want to wear the mask.  Was maintaining oxygen saturation, VBG was normal.  Differential includes but is not limited to COVID-19 versus pneumonia versus PE [BP]   2300 WBC(!): 23.04  Increased from 2 weeks ago, likely secondary to viral process [BP]   2301 Potassium(!!): 2.3  Replacement given, no significant EKG changes [BP]   2311 D-Dimer(!): 1.71  CTA ordered to rule out PE [BP]   2312 Troponin I: 0.016 [BP]   2312 BNP(!): 391 [BP]   2312 Patient was ultimately admitted to Hospital Medicine for management of her acute hypoxemic respiratory failure, likely secondary to COVID-19, CTA pending.  Patient and family updated and agreeable to admission. [BP]      ED Course User Index  [BP] Michael Salazar MD                      Clinical Impression:   Final diagnoses:  [R06.02] Shortness of breath  [U07.1] COVID-19 (Primary)  [J96.01] Acute hypoxemic respiratory failure        ED Disposition Condition    Admit Stable                Michael Salazar MD  Resident  11/15/23 2314       Michael Salazar MD  Resident  11/15/23 2316

## 2023-11-16 NOTE — HPI
Jessica Floyd is a 73 year old white woman with former smoking, coronary artery disease, dyslipidemia, hypertension, gout, anxiety, history of subarachnoid hemorrhage in 1999 due to ruptured aneurysm status post clipping, pulmonary emphysema, mild pulmonary hypertension, allergic rhinitis, stress induced reflex syncope, history of ESBL urinary tract infection, history of anterior cruciate ligament repair in 2012, history of left hip arthroplasty on 5/28/2023, history of carotid artery stent, history of appendectomy. She lives in [facility]. She is . Her primary care physician is Dr. Effie Olmedo. Her cardiologist is Dr. Michael Lal.   She was hospitalized at [facility] from 10/28/2023 to 10/31/2023 for shortness of breath with hypoxia. Chest CTA showed pulmonary emphysema. Echocardiogram showed mild pulmonary hypertension. She was prescribed fluticasone-vilanterol, tiotropium, folic acid, multivitamin, quetiapine, and 1 liter/minute of supplemental oxygen as needed.   She was seen at [facility] clinic on 11/14/2023 for nasal congestion and cough for a week. She was found to have COVID-19. She received COVID-19 vaccinations on 1/9/2021, 1/30/2021, 8/23/2021, 4/20/2022, and 9/30/2023. She was prescribed fluticasone nasal spray.   She presented to [facility] Emergency Department on 11/15/2023 for acute shortness of breath while lying down, not improved with supplemental oxygen, and generalized weakness for the last few days. She continued to have chest and nasal congestion. Emergency medical services found her to have oxygen saturation of 85%. She was given albuterol-ipratropium nebulizer and put on BiPAP.    In the emergency department, heart rate was 90 to 110. She had intermittent tachypnea. Oxygen saturation was 94% on room air. Labs showed WBC 36234/uL, from 9910 on 10/31/2023, sodium 130 mmol/L from 133, potassium 2.3 mmol/L, BNP  391 pg/mL, D-dimer 1.71 mg/L. Chest X-ray showed increased lung markings similar to previous. She was given 40 mEq of potassium. She was admitted to Hospital Medicine Team D.

## 2023-11-16 NOTE — ASSESSMENT & PLAN NOTE
Patient with Hypoxic Respiratory failure which is Acute.  she is on home oxygen at recently started on 1L NC PRN at home LPM. Supplemental oxygen was provided and noted-   Found hypoxic to O2 sat of 85% at home on RA with EMS, Improved s/p bipap.     Signs/symptoms of respiratory failure include- tachypnea. Contributing diagnoses includes - COPD and Covid-19  Labs and images were reviewed. Patient Has not had a recent ABG. Will treat underlying causes and adjust management of respiratory failure as follows-     -Leukocytosis of 23k on admission, procal negative   -Covid positive. D-dimer 1.71, likely elevated in setting of acute covid infection. Age adjust d-dimer makes VTA unlikely. Wells' score for PE is 1.5. .   -CTA chest without large PE but or right heart strain but small thromboemboli are not excluded. Patchy ground-glass attenuation with atelectatic versus consolidative.  -Check lower extremity US given inconclusive CTA   -Recent TTE (10/29/23) reviewed.   -Steroids/Remdesivir for Covid  -PRN duo nebs, mucinex, tessalon perles   -Continue home singular and inhalers

## 2023-11-16 NOTE — ASSESSMENT & PLAN NOTE
Patient has hypokalemia which is Acute and currently controlled. Most recent potassium levels reviewed-   Lab Results   Component Value Date    K 2.3 (LL) 11/15/2023   . Will continue potassium replacement per protocol and recheck repeat levels after replacement completed.   Hold home HCTZ

## 2023-11-16 NOTE — NURSING
Pt stable. Aox4. 3L NC. NSR. BP WNL. Good appetite. Adequate UOP. No BM.  at bedside. Review POC. NAD. Safety precautions maintained. Call light within reach.

## 2023-11-16 NOTE — ED TRIAGE NOTES
Jessica Floyd, a 73 y.o. female presents to the ED via EMS w/ complaint of SOB. Pt tested pos for Covid yesterday. Pt states that she started feeling SOB at appx 0300 this morning. SOB got progressively worse throughout the day which caused the pt to call EMS.     Triage note:  Chief Complaint   Patient presents with    Shortness of Breath     85% on 2 L on EMS arrival. Satting 98% currently on CPAP. Dx covid yesterday. Seen week ago here for SOB.     Review of patient's allergies indicates:  No Known Allergies  Past Medical History:   Diagnosis Date    Allergic rhinitis     Amblyopia     Carotid stenosis     Coronary atherosclerosis     Outside [facility] 6/2014: 20% mLAD, 50% mCx, 20%, mRCA    Dyslipidemia     ESBL (extended spectrum beta-lactamase) producing bacteria infection     UTI    Hypertension     Nausea and vomiting 05/26/2017    SAH (subarachnoid hemorrhage) 08/24/2016 1999, s/p clipping    Subarachnoid hemorrhage due to ruptured aneurysm 1999

## 2023-11-16 NOTE — HOSPITAL COURSE
Chest CTA showed no pulmonary embolism. Lower extremity ultrasound showed no deep venous thrombosis. She was put on dexamethasone, remdesivir, and prophylactic dose enoxaparin. Symptoms improved. She finished the course of remdesivir. She was prescribed the rest of the course of dexamethasone.

## 2023-11-16 NOTE — SUBJECTIVE & OBJECTIVE
Past Medical History:   Diagnosis Date    Allergic rhinitis     Amblyopia     Carotid stenosis     Coronary atherosclerosis     Outside [facility] 6/2014: 20% mLAD, 50% mCx, 20%, mRCA    Dyslipidemia     ESBL (extended spectrum beta-lactamase) producing bacteria infection     UTI    Hypertension     Nausea and vomiting 05/26/2017    SAH (subarachnoid hemorrhage) 08/24/2016 1999, s/p clipping    Subarachnoid hemorrhage due to ruptured aneurysm 1999       Past Surgical History:   Procedure Laterality Date    ADENOIDECTOMY      ANTERIOR CRUCIATE LIGAMENT REPAIR  2012    APPENDECTOMY      CATARACT EXTRACTION W/  INTRAOCULAR LENS IMPLANT Right 11/07/2022    Procedure: EXTRACTION, CATARACT, WITH IOL INSERTION;  Surgeon: Higinio Cristina MD;  Location: [facility];  Service: Ophthalmology;  Laterality: Right;    CATARACT EXTRACTION W/  INTRAOCULAR LENS IMPLANT Left 11/21/2022    Procedure: EXTRACTION, CATARACT, WITH IOL INSERTION;  Surgeon: Higinio Cristina MD;  Location: [facility];  Service: Ophthalmology;  Laterality: Left;    CEREBRAL ANEURYSM REPAIR  1999    clip    DENTAL SURGERY      EYE SURGERY Bilateral     cataract removal and lens implants    HIP ARTHROPLASTY Left 05/28/2023    Procedure: TOTAL HIP ARTHROPLASTY;  Surgeon: Juan Wan MD;  Location: [address];  Service: Orthopedics;  Laterality: Left;    TONSILLECTOMY         Review of patient's allergies indicates:  No Known Allergies    Current Facility-Administered Medications on File Prior to Encounter   Medication    mupirocin 2 % ointment    tranexamic acid (CYKLOKAPRON) 1,000 mg in sodium chloride 0.9 % 100 mL IVPB (MB+)    tranexamic acid (CYKLOKAPRON) 1,000 mg in sodium chloride 0.9 % 100 mL IVPB (MB+)     Current Outpatient Medications on File Prior to Encounter   Medication Sig    allopurinoL (ZYLOPRIM) 100 MG tablet Take 2 tablets (200 mg total) by mouth once daily.    amLODIPine (NORVASC) 5 MG tablet TAKE 1 TABLET BY MOUTH EVERY DAY     atorvastatin (LIPITOR) 80 MG tablet TAKE 1 TABLET BY MOUTH EVERY DAY    carvediloL (COREG) 6.25 MG tablet Take 1 tablet (6.25 mg total) by mouth 2 (two) times daily with meals.    hydroCHLOROthiazide (HYDRODIURIL) 25 MG tablet Take 1 tablet (25 mg total) by mouth once daily.    pantoprazole (PROTONIX) 40 MG tablet Take 1 tablet (40 mg total) by mouth once daily.    acetaminophen (TYLENOL) 650 MG TbSR Take 1 tablet (650 mg total) by mouth every 6 to 8 hours as needed (pain (mild-moderate)).    aspirin (ECOTRIN) 81 MG EC tablet Take 81 mg by mouth once daily.    benzonatate (TESSALON) 200 MG capsule Take 1 capsule (200 mg total) by mouth 3 (three) times daily as needed for Cough.    celecoxib (CELEBREX) 200 MG capsule Take 1 capsule (200 mg total) by mouth as needed.    colchicine, gout, (COLCRYS) 0.6 mg tablet Take 1 tablet (0.6 mg total) by mouth once daily. As needed for gout    fluticasone furoate-vilanteroL (BREO) 100-25 mcg/dose diskus inhaler Inhale 1 puff into the lungs once daily. Controller    fluticasone propionate (FLONASE) 50 mcg/actuation nasal spray SPRAY 2 pumps INTO EACH NOSTRIL ONCE DAILY    folic acid (FOLVITE) 1 MG tablet Take 1 tablet (1 mg total) by mouth once daily.    melatonin (MELATIN) 3 mg tablet Take 2 tablets (6 mg total) by mouth nightly as needed for Insomnia.    montelukast (SINGULAIR) 10 mg tablet Take 1 tablet (10 mg total) by mouth every evening.    multivitamin (THERAGRAN) tablet Take 1 tablet by mouth once daily.    prenatal no122/iron/folic acid (PRENATAL MULTI ORAL) Take 1 tablet by mouth once daily.    QUEtiapine (SEROQUEL) 50 MG tablet Take 1 tablet (50 mg total) by mouth every evening.    tiotropium bromide (SPIRIVA RESPIMAT) 2.5 mcg/actuation inhaler Inhale 2 puffs into the lungs once daily. Controller     Family History       Problem Relation (Age of Onset)    Alcohol abuse Father    Aneurysm Mother    Gout Brother    Heart disease Brother    Hypertension Mother, Father     Kidney disease Brother    No Known Problems Daughter, Daughter, Daughter          Tobacco Use    Smoking status: Former     Current packs/day: 0.00     Average packs/day: 0.5 packs/day for 20.0 years (10.0 ttl pk-yrs)     Types: Cigarettes     Start date: 1979     Quit date: 1999     Years since quittin.8     Passive exposure: Past    Smokeless tobacco: Never   Substance and Sexual Activity    Alcohol use: Yes     Alcohol/week: 7.0 standard drinks of alcohol     Types: 7 Glasses of wine per week     Comment: One glass of wine prior to dinner    Drug use: No    Sexual activity: Yes     Partners: Male     Review of Systems   Constitutional:  Negative for chills and fever.   HENT:  Negative for trouble swallowing.    Eyes:  Negative for photophobia and visual disturbance.   Respiratory:  Positive for shortness of breath. Negative for cough.    Cardiovascular:  Negative for chest pain, palpitations and leg swelling.   Gastrointestinal:  Positive for nausea. Negative for abdominal pain, constipation, diarrhea and vomiting.   Genitourinary:  Negative for dysuria, frequency and hematuria.   Musculoskeletal:  Negative for back pain, gait problem and neck pain.   Skin:  Negative for rash and wound.   Neurological:  Positive for weakness. Negative for syncope, speech difficulty and light-headedness.   Psychiatric/Behavioral:  Negative for agitation and confusion. The patient is not nervous/anxious.      Objective:     Vital Signs (Most Recent):  Temp: 99 °F (37.2 °C) (23)  Pulse: 89 (23)  Resp: 15 (23)  BP: 127/70 (23)  SpO2: 97 % (23) Vital Signs (24h Range):  Temp:  [98 °F (36.7 °C)-99 °F (37.2 °C)] 99 °F (37.2 °C)  Pulse:  [] 89  Resp:  [15-29] 15  SpO2:  [94 %-98 %] 97 %  BP: (127-149)/(70-82) 127/70     Weight: 54.3 kg (119 lb 11.4 oz)  Body mass index is 20.55 kg/m².     Physical Exam  Vitals and nursing note reviewed.   Constitutional:        General: She is not in acute distress.     Appearance: She is well-developed.   HENT:      Head: Normocephalic and atraumatic.      Mouth/Throat:      Pharynx: No oropharyngeal exudate.   Eyes:      Extraocular Movements: Extraocular movements intact.      Conjunctiva/sclera: Conjunctivae normal.   Cardiovascular:      Rate and Rhythm: Normal rate and regular rhythm.      Heart sounds: Normal heart sounds.   Pulmonary:      Effort: Pulmonary effort is normal. No respiratory distress.      Breath sounds: Normal breath sounds. No wheezing.      Comments: On 2L NC  Abdominal:      General: Bowel sounds are normal. There is no distension.      Palpations: Abdomen is soft.      Tenderness: There is no abdominal tenderness.   Musculoskeletal:         General: Normal range of motion.      Cervical back: Normal range of motion and neck supple.      Right lower leg: No edema.      Left lower leg: No edema.   Skin:     General: Skin is warm and dry.      Capillary Refill: Capillary refill takes less than 2 seconds.   Neurological:      General: No focal deficit present.      Mental Status: She is alert and oriented to person, place, and time.      Cranial Nerves: No cranial nerve deficit.      Sensory: No sensory deficit.      Motor: No weakness.   Psychiatric:         Behavior: Behavior normal.         Thought Content: Thought content normal.         Judgment: Judgment normal.                Significant Labs: All pertinent labs within the past 24 hours have been reviewed.  CBC:   Recent Labs   Lab 11/15/23  2004   WBC 23.04*   HGB 12.1   HCT 37.0        CMP:   Recent Labs   Lab 11/15/23  2004   *   K 2.3*   CL 92*   CO2 26   *   BUN 5*   CREATININE 0.9   CALCIUM 8.8   PROT 7.2   ALBUMIN 3.3*   BILITOT 0.8   ALKPHOS 90   AST 28   ALT 17   ANIONGAP 12     Cardiac Markers:   Recent Labs   Lab 11/15/23  2004   *       Troponin:   Recent Labs   Lab 11/15/23  2004   TROPONINI 0.016     TSH:   Recent Labs    Lab 10/28/23  1348   TSH 0.719       Significant Imaging: I have reviewed all pertinent imaging results/findings within the past 24 hours.  CTA Chest Non-Coronary (PE Studies)  Narrative: EXAMINATION:  CTA CHEST NON CORONARY (PE STUDIES)    CLINICAL HISTORY:  Pulmonary embolism (PE) suspected, positive D-dimer;    TECHNIQUE:  Low dose axial images, sagittal and coronal reformations were obtained from the thoracic inlet to the lung bases following the IV administration of 75 mL of Omnipaque 350.  MIP images were performed.    COMPARISON:  Chest radiograph 11/15/2023.  CT chest 10/31/2023.    FINDINGS:  Base of Neck: Right carotid artery stent.    Thoracic soft tissues: Normal.    Aorta/vasculature: Left-sided aortic arch.  No aneurysm.  Calcific atherosclerosis including branch vessels.    Heart: Left atrial prominence.  No effusion.  Coronary artery calcific atherosclerosis.  Aortic valve annular calcifications.    Pulmonary vasculature: Evaluation limited due to extensive respiratory motion and streak artifact from the right upper extremity in the field of view.  No large saddle embolus or pulmonary infarct.  Questionable filling defect in a right lower lobe segmental branch (coronal 601:158), favored artifactual.  Small thromboemboli are not excluded on the basis of this examination.  No evidence for right heart strain.  Main pulmonary trunk measures at the upper limit of normal.    Aaliyah/Mediastinum: No pathologic pooja enlargement.    Airways: Patent.    Lungs/Pleura: Severely limited evaluation due to respiratory motion.  Suspected ground-glass attenuation with atelectatic versus consolidative change involving the lingula, right middle lobe, and right lung base.    Esophagus: Normal.    Upper Abdomen: Stable left hepatic lobe hypodensity.    Bones: No acute displaced fracture.  No suspicious lytic or sclerotic lesions.  Superior subluxation of the humeral head with respect to the glenoid, which appears to abut  the inferior chromium, compatible with rotator cuff pathology.  Mild height loss involving the lower thoracic/upper lumbar vertebral bodies.  Impression: 1. Limited examination as above.  No large saddle embolus or pulmonary infarct.  No evidence for right heart strain. Questionable filling defect in a right lower lobe segmental branch (coronal 601:158), favored artifactual. Small thromboemboli are not excluded on the basis of this examination.  2. Patchy ground-glass attenuation with atelectatic versus consolidative change, noting significant motion artifact.  Nonspecific appearance, may reflect multifocal infectious/inflammatory process.  3. Additional findings as above.  This report was flagged in Epic as abnormal.    Electronically signed by resident: Misha Greenwood  Date:    11/16/2023  Time:    00:47    Electronically signed by: Armani Kelley MD  Date:    11/16/2023  Time:    01:22

## 2023-11-16 NOTE — ASSESSMENT & PLAN NOTE
Patient with known CAD s/p  no intervention , which is controlled Will continue ASA and Statin and monitor for S/Sx of angina/ACS. Continue to monitor on telemetry.  Denies chest pain. Troponin negative.

## 2023-11-16 NOTE — ASSESSMENT & PLAN NOTE
Patient has hyponatremia which is controlled,We will aim to correct the sodium by 4-6mEq in 24 hours. We will monitor sodium Daily. The hyponatremia is due to Medications: Thiazide diuretics. We will obtain the following studies: Urine sodium, urine osmolality, serum osmolality. Recent Tsh wnl. We will treat the hyponatremia with Removal of offending medications. The patient's sodium results have been reviewed and are listed below. Hold home HCTZ for now  Recent Labs   Lab 11/15/23  2004   *

## 2023-11-16 NOTE — ASSESSMENT & PLAN NOTE
Chronic, controlled. Latest blood pressure and vitals reviewed-     Temp:  [98 °F (36.7 °C)-99 °F (37.2 °C)]   Pulse:  []   Resp:  [15-29]   BP: (127-149)/(70-82)   SpO2:  [94 %-98 %] .   Home meds for hypertension were reviewed and noted below.   Hypertension Medications               amLODIPine (NORVASC) 5 MG tablet TAKE 1 TABLET BY MOUTH EVERY DAY    carvediloL (COREG) 6.25 MG tablet Take 1 tablet (6.25 mg total) by mouth 2 (two) times daily with meals.    hydroCHLOROthiazide (HYDRODIURIL) 25 MG tablet Take 1 tablet (25 mg total) by mouth once daily.            While in the hospital, will manage blood pressure as follows; Continue home antihypertensive regimen. Hold home HCTZ    Will utilize p.r.n. blood pressure medication only if patient's blood pressure greater than 180/110 and she develops symptoms such as worsening chest pain or shortness of breath.

## 2023-11-16 NOTE — H&P
[facility] ([address] Medicine  History & Physical    Patient Name: Jessica Floyd  MRN: [id number]  Patient Class: IP- Inpatient  Admission Date: 11/15/2023  Attending Physician: No att. providers found   Primary Care Provider: Effie Olmedo MD         Patient information was obtained from patient, spouse/SO, past medical records, and ER records.     Subjective:     Principal Problem:COVID-19    Chief Complaint:   Chief Complaint   Patient presents with    Shortness of Breath     85% on 2 L on EMS arrival. Satting 98% currently on CPAP. Dx covid yesterday. Seen week ago here for SOB.        HPI: Jessica Floyd is a 73 y.o. female with past medical history of CAD, HLD, ESBL UTI, subarachnoid hemorrhage, mild pulmonary HTN, recently thought to have COPD/emphysema during a hospital admission and started on inhalers/singular and 1L NC prn. She presents for acute shortness of breath and generalized weakness. She has one week history of nasal congestion and cough and saw PCP yesterday and diagnosed with Covid and sent home with supportive care. was recently diagnosed with COVID. However, today she was lying down and became acutely short of breath, not improved with supplemental oxygen. Her  at bedside states she was also generally weak the last few days. Able to ambulate on her own but feels more weak than usual. Continues to have chest and nasal congestion. Denies any chest pain, palpitations, fever, chills, leg swelling. EMS found patient to have oxygen saturations at 85% and she was given DuoNebs and brought in on BiPAP.       In the ED, afebrile, HR , intermittent tachypnea, saturating 94% on RA, improved on 2-3L NC. Labs with elevated d-dimer and BNP. Troponin wnl. Hypokalemia and hyponatremia. Leukocytosis of 23k. CXR without acute findings. CTA chest pending. Given 40 mEq of potassium. Admitted to .      Past Medical History:   Diagnosis Date    Allergic rhinitis      Amblyopia     Carotid stenosis     Coronary atherosclerosis     Outside [facility] 6/2014: 20% mLAD, 50% mCx, 20%, mRCA    Dyslipidemia     ESBL (extended spectrum beta-lactamase) producing bacteria infection     UTI    Hypertension     Nausea and vomiting 05/26/2017    SAH (subarachnoid hemorrhage) 08/24/2016 1999, s/p clipping    Subarachnoid hemorrhage due to ruptured aneurysm 1999       Past Surgical History:   Procedure Laterality Date    ADENOIDECTOMY      ANTERIOR CRUCIATE LIGAMENT REPAIR  2012    APPENDECTOMY      CATARACT EXTRACTION W/  INTRAOCULAR LENS IMPLANT Right 11/07/2022    Procedure: EXTRACTION, CATARACT, WITH IOL INSERTION;  Surgeon: Higinio Cristina MD;  Location: [facility];  Service: Ophthalmology;  Laterality: Right;    CATARACT EXTRACTION W/  INTRAOCULAR LENS IMPLANT Left 11/21/2022    Procedure: EXTRACTION, CATARACT, WITH IOL INSERTION;  Surgeon: Higinio Cristina MD;  Location: [facility];  Service: Ophthalmology;  Laterality: Left;    CEREBRAL ANEURYSM REPAIR  1999    clip    DENTAL SURGERY      EYE SURGERY Bilateral     cataract removal and lens implants    HIP ARTHROPLASTY Left 05/28/2023    Procedure: TOTAL HIP ARTHROPLASTY;  Surgeon: Juan Wan MD;  Location: [address];  Service: Orthopedics;  Laterality: Left;    TONSILLECTOMY         Review of patient's allergies indicates:  No Known Allergies    Current Facility-Administered Medications on File Prior to Encounter   Medication    mupirocin 2 % ointment    tranexamic acid (CYKLOKAPRON) 1,000 mg in sodium chloride 0.9 % 100 mL IVPB (MB+)    tranexamic acid (CYKLOKAPRON) 1,000 mg in sodium chloride 0.9 % 100 mL IVPB (MB+)     Current Outpatient Medications on File Prior to Encounter   Medication Sig    allopurinoL (ZYLOPRIM) 100 MG tablet Take 2 tablets (200 mg total) by mouth once daily.    amLODIPine (NORVASC) 5 MG tablet TAKE 1 TABLET BY MOUTH EVERY DAY    atorvastatin (LIPITOR) 80 MG tablet TAKE 1 TABLET BY MOUTH EVERY DAY     carvediloL (COREG) 6.25 MG tablet Take 1 tablet (6.25 mg total) by mouth 2 (two) times daily with meals.    hydroCHLOROthiazide (HYDRODIURIL) 25 MG tablet Take 1 tablet (25 mg total) by mouth once daily.    pantoprazole (PROTONIX) 40 MG tablet Take 1 tablet (40 mg total) by mouth once daily.    acetaminophen (TYLENOL) 650 MG TbSR Take 1 tablet (650 mg total) by mouth every 6 to 8 hours as needed (pain (mild-moderate)).    aspirin (ECOTRIN) 81 MG EC tablet Take 81 mg by mouth once daily.    benzonatate (TESSALON) 200 MG capsule Take 1 capsule (200 mg total) by mouth 3 (three) times daily as needed for Cough.    celecoxib (CELEBREX) 200 MG capsule Take 1 capsule (200 mg total) by mouth as needed.    colchicine, gout, (COLCRYS) 0.6 mg tablet Take 1 tablet (0.6 mg total) by mouth once daily. As needed for gout    fluticasone furoate-vilanteroL (BREO) 100-25 mcg/dose diskus inhaler Inhale 1 puff into the lungs once daily. Controller    fluticasone propionate (FLONASE) 50 mcg/actuation nasal spray SPRAY 2 pumps INTO EACH NOSTRIL ONCE DAILY    folic acid (FOLVITE) 1 MG tablet Take 1 tablet (1 mg total) by mouth once daily.    melatonin (MELATIN) 3 mg tablet Take 2 tablets (6 mg total) by mouth nightly as needed for Insomnia.    montelukast (SINGULAIR) 10 mg tablet Take 1 tablet (10 mg total) by mouth every evening.    multivitamin (THERAGRAN) tablet Take 1 tablet by mouth once daily.    prenatal no122/iron/folic acid (PRENATAL MULTI ORAL) Take 1 tablet by mouth once daily.    QUEtiapine (SEROQUEL) 50 MG tablet Take 1 tablet (50 mg total) by mouth every evening.    tiotropium bromide (SPIRIVA RESPIMAT) 2.5 mcg/actuation inhaler Inhale 2 puffs into the lungs once daily. Controller     Family History       Problem Relation (Age of Onset)    Alcohol abuse Father    Aneurysm Mother    Gout Brother    Heart disease Brother    Hypertension Mother, Father    Kidney disease Brother    No Known Problems Daughter, Daughter, Daughter           Tobacco Use    Smoking status: Former     Current packs/day: 0.00     Average packs/day: 0.5 packs/day for 20.0 years (10.0 ttl pk-yrs)     Types: Cigarettes     Start date: 1979     Quit date: 1999     Years since quittin.8     Passive exposure: Past    Smokeless tobacco: Never   Substance and Sexual Activity    Alcohol use: Yes     Alcohol/week: 7.0 standard drinks of alcohol     Types: 7 Glasses of wine per week     Comment: One glass of wine prior to dinner    Drug use: No    Sexual activity: Yes     Partners: Male     Review of Systems   Constitutional:  Negative for chills and fever.   HENT:  Negative for trouble swallowing.    Eyes:  Negative for photophobia and visual disturbance.   Respiratory:  Positive for shortness of breath. Negative for cough.    Cardiovascular:  Negative for chest pain, palpitations and leg swelling.   Gastrointestinal:  Positive for nausea. Negative for abdominal pain, constipation, diarrhea and vomiting.   Genitourinary:  Negative for dysuria, frequency and hematuria.   Musculoskeletal:  Negative for back pain, gait problem and neck pain.   Skin:  Negative for rash and wound.   Neurological:  Positive for weakness. Negative for syncope, speech difficulty and light-headedness.   Psychiatric/Behavioral:  Negative for agitation and confusion. The patient is not nervous/anxious.      Objective:     Vital Signs (Most Recent):  Temp: 99 °F (37.2 °C) (23)  Pulse: 89 (23 011)  Resp: 15 (23)  BP: 127/70 (23)  SpO2: 97 % (23) Vital Signs (24h Range):  Temp:  [98 °F (36.7 °C)-99 °F (37.2 °C)] 99 °F (37.2 °C)  Pulse:  [] 89  Resp:  [15-29] 15  SpO2:  [94 %-98 %] 97 %  BP: (127-149)/(70-82) 127/70     Weight: 54.3 kg (119 lb 11.4 oz)  Body mass index is 20.55 kg/m².     Physical Exam  Vitals and nursing note reviewed.   Constitutional:       General: She is not in acute distress.     Appearance: She is well-developed.    HENT:      Head: Normocephalic and atraumatic.      Mouth/Throat:      Pharynx: No oropharyngeal exudate.   Eyes:      Extraocular Movements: Extraocular movements intact.      Conjunctiva/sclera: Conjunctivae normal.   Cardiovascular:      Rate and Rhythm: Normal rate and regular rhythm.      Heart sounds: Normal heart sounds.   Pulmonary:      Effort: Pulmonary effort is normal. No respiratory distress.      Breath sounds: Normal breath sounds. No wheezing.      Comments: On 2L NC  Abdominal:      General: Bowel sounds are normal. There is no distension.      Palpations: Abdomen is soft.      Tenderness: There is no abdominal tenderness.   Musculoskeletal:         General: Normal range of motion.      Cervical back: Normal range of motion and neck supple.      Right lower leg: No edema.      Left lower leg: No edema.   Skin:     General: Skin is warm and dry.      Capillary Refill: Capillary refill takes less than 2 seconds.   Neurological:      General: No focal deficit present.      Mental Status: She is alert and oriented to person, place, and time.      Cranial Nerves: No cranial nerve deficit.      Sensory: No sensory deficit.      Motor: No weakness.   Psychiatric:         Behavior: Behavior normal.         Thought Content: Thought content normal.         Judgment: Judgment normal.                Significant Labs: All pertinent labs within the past 24 hours have been reviewed.  CBC:   Recent Labs   Lab 11/15/23  2004   WBC 23.04*   HGB 12.1   HCT 37.0        CMP:   Recent Labs   Lab 11/15/23  2004   *   K 2.3*   CL 92*   CO2 26   *   BUN 5*   CREATININE 0.9   CALCIUM 8.8   PROT 7.2   ALBUMIN 3.3*   BILITOT 0.8   ALKPHOS 90   AST 28   ALT 17   ANIONGAP 12     Cardiac Markers:   Recent Labs   Lab 11/15/23  2004   *       Troponin:   Recent Labs   Lab 11/15/23  2004   TROPONINI 0.016     TSH:   Recent Labs   Lab 10/28/23  1348   TSH 0.719       Significant Imaging: I have reviewed  all pertinent imaging results/findings within the past 24 hours.  CTA Chest Non-Coronary (PE Studies)  Narrative: EXAMINATION:  CTA CHEST NON CORONARY (PE STUDIES)    CLINICAL HISTORY:  Pulmonary embolism (PE) suspected, positive D-dimer;    TECHNIQUE:  Low dose axial images, sagittal and coronal reformations were obtained from the thoracic inlet to the lung bases following the IV administration of 75 mL of Omnipaque 350.  MIP images were performed.    COMPARISON:  Chest radiograph 11/15/2023.  CT chest 10/31/2023.    FINDINGS:  Base of Neck: Right carotid artery stent.    Thoracic soft tissues: Normal.    Aorta/vasculature: Left-sided aortic arch.  No aneurysm.  Calcific atherosclerosis including branch vessels.    Heart: Left atrial prominence.  No effusion.  Coronary artery calcific atherosclerosis.  Aortic valve annular calcifications.    Pulmonary vasculature: Evaluation limited due to extensive respiratory motion and streak artifact from the right upper extremity in the field of view.  No large saddle embolus or pulmonary infarct.  Questionable filling defect in a right lower lobe segmental branch (coronal 601:158), favored artifactual.  Small thromboemboli are not excluded on the basis of this examination.  No evidence for right heart strain.  Main pulmonary trunk measures at the upper limit of normal.    Aaliyah/Mediastinum: No pathologic pooja enlargement.    Airways: Patent.    Lungs/Pleura: Severely limited evaluation due to respiratory motion.  Suspected ground-glass attenuation with atelectatic versus consolidative change involving the lingula, right middle lobe, and right lung base.    Esophagus: Normal.    Upper Abdomen: Stable left hepatic lobe hypodensity.    Bones: No acute displaced fracture.  No suspicious lytic or sclerotic lesions.  Superior subluxation of the humeral head with respect to the glenoid, which appears to abut the inferior chromium, compatible with rotator cuff pathology.  Mild height  loss involving the lower thoracic/upper lumbar vertebral bodies.  Impression: 1. Limited examination as above.  No large saddle embolus or pulmonary infarct.  No evidence for right heart strain. Questionable filling defect in a right lower lobe segmental branch (coronal 601:158), favored artifactual. Small thromboemboli are not excluded on the basis of this examination.  2. Patchy ground-glass attenuation with atelectatic versus consolidative change, noting significant motion artifact.  Nonspecific appearance, may reflect multifocal infectious/inflammatory process.  3. Additional findings as above.  This report was flagged in Epic as abnormal.    Electronically signed by resident: Misha Greenwood  Date:    11/16/2023  Time:    00:47    Electronically signed by: Armani Kelley MD  Date:    11/16/2023  Time:    01:22       Assessment/Plan:     * COVID-19  Patient is identified as Severe COVID-19 based on hypoxemia with O2 saturations <94% on room air or on ambulation   Initiate standard COVID protocols; COVID-19 testing Collection Date: 5/30/2023 Collection Time:   8:46 AM ,Infection Control notification  and isolation- respiratory, contact and droplet per protocol    Diagnostics: CBC, CMP, Procalcitonin, BNP, Troponin, ECG, and Portable CXR    Management: Initiate targeted therapy with Remdesivir, 200mg IV x1, followed by 100mg IV daily x5 days total and Dexamethasone PO/IV 6mg daily x10 days, Maintain oxygen saturations 92-96% via Nasal Cannula  LPM and monitor with continuous/intermittent pulse oximetry. , Inhaled bronchodilators as needed for shortness of breath., and Continuous cardiac monitoring.    Advance Care Planning Current advance care plan has not been discussed with patient/family/POA. Continue status of Full Code for now     Acute hypoxic respiratory failure  Patient with Hypoxic Respiratory failure which is Acute.  she is on home oxygen at recently started on 1L NC PRN at home LPM. Supplemental oxygen  was provided and noted-   Found hypoxic to O2 sat of 85% at home on RA with EMS, Improved s/p bipap.     Signs/symptoms of respiratory failure include- tachypnea. Contributing diagnoses includes - COPD and Covid-19  Labs and images were reviewed. Patient Has not had a recent ABG. Will treat underlying causes and adjust management of respiratory failure as follows-     -Leukocytosis of 23k on admission, procal negative   -Covid positive. D-dimer 1.71, likely elevated in setting of acute covid infection. Age adjust d-dimer makes VTA unlikely. Wells' score for PE is 1.5. .   -CTA chest without large PE but or right heart strain but small thromboemboli are not excluded. Patchy ground-glass attenuation with atelectatic versus consolidative.  -Check lower extremity US given inconclusive CTA   -Recent TTE (10/29/23) reviewed.   -Steroids/Remdesivir for Covid  -PRN duo nebs, mucinex, tessalon perles   -Continue home singular and inhalers         Severe anxiety  -Continue home Seroquel    Gout  -Chronic, no acute flare  -Continue allopurinol     Hyponatremia  Patient has hyponatremia which is controlled,We will aim to correct the sodium by 4-6mEq in 24 hours. We will monitor sodium Daily. The hyponatremia is due to Medications: Thiazide diuretics. We will obtain the following studies: Urine sodium, urine osmolality, serum osmolality. Recent Tsh wnl. We will treat the hyponatremia with Removal of offending medications. The patient's sodium results have been reviewed and are listed below. Hold home HCTZ for now  Recent Labs   Lab 11/15/23  2004   *       Hypertension  Chronic, controlled. Latest blood pressure and vitals reviewed-     Temp:  [98 °F (36.7 °C)-99 °F (37.2 °C)]   Pulse:  []   Resp:  [15-29]   BP: (127-149)/(70-82)   SpO2:  [94 %-98 %] .   Home meds for hypertension were reviewed and noted below.   Hypertension Medications               amLODIPine (NORVASC) 5 MG tablet TAKE 1 TABLET BY MOUTH  EVERY DAY    carvediloL (COREG) 6.25 MG tablet Take 1 tablet (6.25 mg total) by mouth 2 (two) times daily with meals.    hydroCHLOROthiazide (HYDRODIURIL) 25 MG tablet Take 1 tablet (25 mg total) by mouth once daily.            While in the hospital, will manage blood pressure as follows; Continue home antihypertensive regimen. Hold home HCTZ    Will utilize p.r.n. blood pressure medication only if patient's blood pressure greater than 180/110 and she develops symptoms such as worsening chest pain or shortness of breath.    Hypokalemia  Patient has hypokalemia which is Acute and currently controlled. Most recent potassium levels reviewed-   Lab Results   Component Value Date    K 2.3 (LL) 11/15/2023   . Will continue potassium replacement per protocol and recheck repeat levels after replacement completed.   Hold home HCTZ    Gastroesophageal reflux disease without esophagitis  -Continue Protonix     Dyslipidemia  -Continue statin.      Coronary artery disease involving native coronary artery of native heart without angina pectoris  Patient with known CAD s/p  no intervention , which is controlled Will continue ASA and Statin and monitor for S/Sx of angina/ACS. Continue to monitor on telemetry.  Denies chest pain. Troponin negative.       VTE Risk Mitigation (From admission, onward)           Ordered     enoxaparin injection 40 mg  Daily         11/15/23 2257     IP VTE HIGH RISK PATIENT  Once         11/16/23 0049     Place sequential compression device  Until discontinued         11/16/23 0049     Place sequential compression device  Until discontinued         11/15/23 2257                                   ANALISA RamiresC  Department of Hospital Medicine  [facility] - Intensive Care ([place]-)    N/A  Family history non-contributory to current problem   Pertinent information:

## 2023-11-16 NOTE — ASSESSMENT & PLAN NOTE
Patient is identified as Severe COVID-19 based on hypoxemia with O2 saturations <94% on room air or on ambulation   Initiate standard COVID protocols; COVID-19 testing Collection Date: 5/30/2023 Collection Time:   8:46 AM ,Infection Control notification  and isolation- respiratory, contact and droplet per protocol    Diagnostics: CBC, CMP, Procalcitonin, BNP, Troponin, ECG, and Portable CXR    Management: Initiate targeted therapy with Remdesivir, 200mg IV x1, followed by 100mg IV daily x5 days total and Dexamethasone PO/IV 6mg daily x10 days, Maintain oxygen saturations 92-96% via Nasal Cannula  LPM and monitor with continuous/intermittent pulse oximetry. , Inhaled bronchodilators as needed for shortness of breath., and Continuous cardiac monitoring.    Advance Care Planning  Current advance care plan has not been discussed with patient/family/POA. Continue status of Full Code for now

## 2023-11-16 NOTE — SUBJECTIVE & OBJECTIVE
Interval History: Patient eating dinner.  at bedside. Feeling better than yesterday.    Review of Systems   Respiratory:  Positive for cough and shortness of breath.    Neurological:  Negative for seizures and syncope.     Objective:     Vital Signs (Most Recent):  Temp: 98.2 °F (36.8 °C) (11/16/23 1500)  Pulse: 80 (11/16/23 1519)  Resp: 20 (11/16/23 1500)  BP: 128/62 (11/16/23 1600)  SpO2: 95 % (11/16/23 1519) Vital Signs (24h Range):  Temp:  [97.4 °F (36.3 °C)-99 °F (37.2 °C)] 98.2 °F (36.8 °C)  Pulse:  [] 80  Resp:  [13-29] 20  SpO2:  [94 %-99 %] 95 %  BP: (110-149)/(59-82) 128/62     Weight: 54.3 kg (119 lb 11.4 oz)  Body mass index is 20.55 kg/m².    Intake/Output Summary (Last 24 hours) at 11/16/2023 1657  Last data filed at 11/16/2023 1345  Gross per 24 hour   Intake 124.92 ml   Output 1350 ml   Net -1225.08 ml         Physical Exam  Vitals and nursing note reviewed.   Constitutional:       General: She is not in acute distress.     Appearance: She is well-developed and normal weight. She is not diaphoretic.      Interventions: Nasal cannula in place.   Pulmonary:      Effort: Pulmonary effort is normal. No respiratory distress.   Skin:     General: Skin is warm and dry.      Coloration: Skin is not jaundiced or pale.   Neurological:      Mental Status: She is alert and oriented to person, place, and time. Mental status is at baseline.      Motor: No seizure activity.   Psychiatric:         Attention and Perception: Attention normal.         Mood and Affect: Mood and affect normal.         Behavior: Behavior is cooperative.             Significant Labs: All pertinent labs within the past 24 hours have been reviewed.    Significant Imaging: I have reviewed all pertinent imaging results/findings within the past 24 hours.  X-Ray Chest 1 View 11/15/23: FINDINGS:   Increased markings in the lungs, similar to prior.   No consolidation, pleural effusion or pneumothorax.   Cardiomediastinal silhouette is  unremarkable.   CTA Chest Non-Coronary (PE Studies) 11/16/23: FINDINGS:   Base of Neck: Right carotid artery stent.   Thoracic soft tissues: Normal.   Aorta/vasculature: Left-sided aortic arch.  No aneurysm.  Calcific atherosclerosis including branch vessels.   Heart: Left atrial prominence.  No effusion.  Coronary artery calcific atherosclerosis.  Aortic valve annular calcifications.   Pulmonary vasculature: Evaluation limited due to extensive respiratory motion and streak artifact from the right upper extremity in the field of view.  No large saddle embolus or pulmonary infarct.  Questionable filling defect in a right lower lobe segmental branch (coronal 601:158), favored artifactual.  Small thromboemboli are not excluded on the basis of this examination.  No evidence for right heart strain.  Main pulmonary trunk measures at the upper limit of normal.   Aaliyah/Mediastinum: No pathologic pooja enlargement.   Airways: Patent.   Lungs/Pleura: Severely limited evaluation due to respiratory motion.  Suspected ground-glass attenuation with atelectatic versus consolidative change involving the lingula, right middle lobe, and right lung base.   Esophagus: Normal.   Upper Abdomen: Stable left hepatic lobe hypodensity.   Bones: No acute displaced fracture.  No suspicious lytic or sclerotic lesions.  Superior subluxation of the humeral head with respect to the glenoid, which appears to abut the inferior chromium, compatible with rotator cuff pathology.  Mild height loss involving the lower thoracic/upper lumbar vertebral bodies.   Impression:  1. Limited examination as above.  No large saddle embolus or pulmonary infarct.  No evidence for right heart strain. Questionable filling defect in a right lower lobe segmental branch (coronal 601:158), favored artifactual. Small thromboemboli are not excluded on the basis of this examination.   2. Patchy ground-glass attenuation with atelectatic versus consolidative change, noting  significant motion artifact.  Nonspecific appearance, may reflect multifocal infectious/inflammatory process.   3. Additional findings as above.   US Lower Extremity Veins Bilateral 11/16/23: FINDINGS:   Right thigh veins: The common femoral, femoral, popliteal, upper greater saphenous, and deep femoral veins are patent and free of thrombus. The veins are normally compressible and have normal phasic flow and augmentation response.   Right calf veins: The visualized calf veins are patent.   Left thigh veins: The common femoral, femoral, popliteal, upper greater saphenous, and deep femoral veins are patent and free of thrombus. The veins are normally compressible and have normal phasic flow and augmentation response.   Left calf veins: The visualized calf veins are patent.   Miscellaneous: None   Impression:  No evidence of deep venous thrombosis in either lower extremity.

## 2023-11-17 ENCOUNTER — PATIENT MESSAGE (OUTPATIENT)
Dept: PRIMARY CARE CLINIC | Facility: CLINIC | Age: 73
End: 2023-11-17
Payer: MEDICARE

## 2023-11-17 DIAGNOSIS — F41.9 ANXIETY: Primary | ICD-10-CM

## 2023-11-17 PROBLEM — E87.6 HYPOKALEMIA: Status: RESOLVED | Noted: 2017-05-26 | Resolved: 2023-11-17

## 2023-11-17 PROBLEM — E87.1 HYPONATREMIA: Status: RESOLVED | Noted: 2023-06-02 | Resolved: 2023-11-17

## 2023-11-17 LAB
ALBUMIN SERPL BCP-MCNC: 2.7 G/DL (ref 3.5–5.2)
ALP SERPL-CCNC: 69 U/L (ref 55–135)
ALT SERPL W/O P-5'-P-CCNC: 17 U/L (ref 10–44)
ANION GAP SERPL CALC-SCNC: 8 MMOL/L (ref 8–16)
ANISOCYTOSIS BLD QL SMEAR: SLIGHT
AST SERPL-CCNC: 26 U/L (ref 10–40)
BASOPHILS # BLD AUTO: 0.04 K/UL (ref 0–0.2)
BASOPHILS NFR BLD: 0.2 % (ref 0–1.9)
BILIRUB SERPL-MCNC: 0.5 MG/DL (ref 0.1–1)
BUN SERPL-MCNC: 16 MG/DL (ref 8–23)
CALCIUM SERPL-MCNC: 8.7 MG/DL (ref 8.7–10.5)
CHLORIDE SERPL-SCNC: 95 MMOL/L (ref 95–110)
CO2 SERPL-SCNC: 33 MMOL/L (ref 23–29)
CREAT SERPL-MCNC: 0.8 MG/DL (ref 0.5–1.4)
DIFFERENTIAL METHOD: ABNORMAL
EOSINOPHIL # BLD AUTO: 0 K/UL (ref 0–0.5)
EOSINOPHIL NFR BLD: 0 % (ref 0–8)
ERYTHROCYTE [DISTWIDTH] IN BLOOD BY AUTOMATED COUNT: 19.5 % (ref 11.5–14.5)
EST. GFR  (NO RACE VARIABLE): >60 ML/MIN/1.73 M^2
GLUCOSE SERPL-MCNC: 120 MG/DL (ref 70–110)
HCT VFR BLD AUTO: 33.1 % (ref 37–48.5)
HGB BLD-MCNC: 10.8 G/DL (ref 12–16)
HYPOCHROMIA BLD QL SMEAR: ABNORMAL
IMM GRANULOCYTES # BLD AUTO: 0.56 K/UL (ref 0–0.04)
IMM GRANULOCYTES NFR BLD AUTO: 2.9 % (ref 0–0.5)
LYMPHOCYTES # BLD AUTO: 1.7 K/UL (ref 1–4.8)
LYMPHOCYTES NFR BLD: 8.8 % (ref 18–48)
MAGNESIUM SERPL-MCNC: 1.5 MG/DL (ref 1.6–2.6)
MCH RBC QN AUTO: 34.4 PG (ref 27–31)
MCHC RBC AUTO-ENTMCNC: 32.6 G/DL (ref 32–36)
MCV RBC AUTO: 105 FL (ref 82–98)
MONOCYTES # BLD AUTO: 3.2 K/UL (ref 0.3–1)
MONOCYTES NFR BLD: 16.7 % (ref 4–15)
NEUTROPHILS # BLD AUTO: 13.6 K/UL (ref 1.8–7.7)
NEUTROPHILS NFR BLD: 71.4 % (ref 38–73)
NRBC BLD-RTO: 0 /100 WBC
PHOSPHATE SERPL-MCNC: 2 MG/DL (ref 2.7–4.5)
PLATELET # BLD AUTO: 324 K/UL (ref 150–450)
PLATELET BLD QL SMEAR: ABNORMAL
PMV BLD AUTO: 10.9 FL (ref 9.2–12.9)
POCT GLUCOSE: 133 MG/DL (ref 70–110)
POCT GLUCOSE: 137 MG/DL (ref 70–110)
POCT GLUCOSE: 174 MG/DL (ref 70–110)
POCT GLUCOSE: 221 MG/DL (ref 70–110)
POLYCHROMASIA BLD QL SMEAR: ABNORMAL
POTASSIUM SERPL-SCNC: 3.8 MMOL/L (ref 3.5–5.1)
PROT SERPL-MCNC: 6.3 G/DL (ref 6–8.4)
RBC # BLD AUTO: 3.14 M/UL (ref 4–5.4)
SODIUM SERPL-SCNC: 136 MMOL/L (ref 136–145)
WBC # BLD AUTO: 19.02 K/UL (ref 3.9–12.7)

## 2023-11-17 PROCEDURE — 97535 SELF CARE MNGMENT TRAINING: CPT | Mod: HCNC

## 2023-11-17 PROCEDURE — 63600175 PHARM REV CODE 636 W HCPCS: Mod: HCNC

## 2023-11-17 PROCEDURE — 85025 COMPLETE CBC W/AUTO DIFF WBC: CPT | Mod: HCNC

## 2023-11-17 PROCEDURE — 80053 COMPREHEN METABOLIC PANEL: CPT | Mod: HCNC

## 2023-11-17 PROCEDURE — 94761 N-INVAS EAR/PLS OXIMETRY MLT: CPT | Mod: HCNC

## 2023-11-17 PROCEDURE — 94640 AIRWAY INHALATION TREATMENT: CPT | Mod: HCNC

## 2023-11-17 PROCEDURE — 63600175 PHARM REV CODE 636 W HCPCS: Mod: HCNC | Performed by: HOSPITALIST

## 2023-11-17 PROCEDURE — 83735 ASSAY OF MAGNESIUM: CPT | Mod: HCNC

## 2023-11-17 PROCEDURE — 97530 THERAPEUTIC ACTIVITIES: CPT | Mod: HCNC

## 2023-11-17 PROCEDURE — 84100 ASSAY OF PHOSPHORUS: CPT | Mod: HCNC

## 2023-11-17 PROCEDURE — 20600001 HC STEP DOWN PRIVATE ROOM: Mod: HCNC

## 2023-11-17 PROCEDURE — 97165 OT EVAL LOW COMPLEX 30 MIN: CPT | Mod: HCNC

## 2023-11-17 PROCEDURE — 25000003 PHARM REV CODE 250: Mod: HCNC

## 2023-11-17 PROCEDURE — 97162 PT EVAL MOD COMPLEX 30 MIN: CPT | Mod: HCNC

## 2023-11-17 PROCEDURE — 36415 COLL VENOUS BLD VENIPUNCTURE: CPT | Mod: HCNC

## 2023-11-17 PROCEDURE — 97116 GAIT TRAINING THERAPY: CPT | Mod: HCNC

## 2023-11-17 PROCEDURE — 99900035 HC TECH TIME PER 15 MIN (STAT): Mod: HCNC

## 2023-11-17 PROCEDURE — 25000003 PHARM REV CODE 250: Mod: HCNC | Performed by: HOSPITALIST

## 2023-11-17 PROCEDURE — 27000221 HC OXYGEN, UP TO 24 HOURS: Mod: HCNC

## 2023-11-17 PROCEDURE — 27000207 HC ISOLATION: Mod: HCNC

## 2023-11-17 RX ORDER — MAGNESIUM SULFATE HEPTAHYDRATE 40 MG/ML
2 INJECTION, SOLUTION INTRAVENOUS ONCE
Status: COMPLETED | OUTPATIENT
Start: 2023-11-17 | End: 2023-11-17

## 2023-11-17 RX ORDER — SODIUM,POTASSIUM PHOSPHATES 280-250MG
2 POWDER IN PACKET (EA) ORAL
Status: COMPLETED | OUTPATIENT
Start: 2023-11-17 | End: 2023-11-18

## 2023-11-17 RX ORDER — CALCIUM CARBONATE 200(500)MG
500 TABLET,CHEWABLE ORAL 3 TIMES DAILY PRN
Status: DISCONTINUED | OUTPATIENT
Start: 2023-11-17 | End: 2023-11-18

## 2023-11-17 RX ORDER — QUETIAPINE FUMARATE 25 MG/1
50 TABLET, FILM COATED ORAL DAILY PRN
Status: DISCONTINUED | OUTPATIENT
Start: 2023-11-17 | End: 2023-11-19 | Stop reason: HOSPADM

## 2023-11-17 RX ADMIN — FLUTICASONE FUROATE AND VILANTEROL TRIFENATATE 1 PUFF: 100; 25 POWDER RESPIRATORY (INHALATION) at 08:11

## 2023-11-17 RX ADMIN — THERA TABS 1 TABLET: TAB at 08:11

## 2023-11-17 RX ADMIN — CARVEDILOL 6.25 MG: 3.12 TABLET, FILM COATED ORAL at 08:11

## 2023-11-17 RX ADMIN — GUAIFENESIN 400 MG: 200 SOLUTION ORAL at 04:11

## 2023-11-17 RX ADMIN — PANTOPRAZOLE SODIUM 40 MG: 40 TABLET, DELAYED RELEASE ORAL at 08:11

## 2023-11-17 RX ADMIN — GUAIFENESIN 400 MG: 200 SOLUTION ORAL at 08:11

## 2023-11-17 RX ADMIN — FLUTICASONE PROPIONATE 100 MCG: 50 SPRAY, METERED NASAL at 08:11

## 2023-11-17 RX ADMIN — ASPIRIN 81 MG: 81 TABLET, COATED ORAL at 08:11

## 2023-11-17 RX ADMIN — MONTELUKAST 10 MG: 10 TABLET, FILM COATED ORAL at 08:11

## 2023-11-17 RX ADMIN — POTASSIUM & SODIUM PHOSPHATES POWDER PACK 280-160-250 MG 2 PACKET: 280-160-250 PACK at 11:11

## 2023-11-17 RX ADMIN — ATORVASTATIN CALCIUM 80 MG: 40 TABLET, FILM COATED ORAL at 08:11

## 2023-11-17 RX ADMIN — GUAIFENESIN 400 MG: 200 SOLUTION ORAL at 05:11

## 2023-11-17 RX ADMIN — REMDESIVIR 100 MG: 100 INJECTION, POWDER, LYOPHILIZED, FOR SOLUTION INTRAVENOUS at 08:11

## 2023-11-17 RX ADMIN — ALLOPURINOL 200 MG: 100 TABLET ORAL at 08:11

## 2023-11-17 RX ADMIN — BENZONATATE 200 MG: 100 CAPSULE ORAL at 08:11

## 2023-11-17 RX ADMIN — POLYETHYLENE GLYCOL 3350 17 G: 17 POWDER, FOR SOLUTION ORAL at 01:11

## 2023-11-17 RX ADMIN — MAGNESIUM SULFATE HEPTAHYDRATE 2 G: 40 INJECTION, SOLUTION INTRAVENOUS at 11:11

## 2023-11-17 RX ADMIN — ANTACID TABLETS 500 MG: 500 TABLET, CHEWABLE ORAL at 05:11

## 2023-11-17 RX ADMIN — AMLODIPINE BESYLATE 5 MG: 5 TABLET ORAL at 08:11

## 2023-11-17 RX ADMIN — ENOXAPARIN SODIUM 40 MG: 40 INJECTION SUBCUTANEOUS at 05:11

## 2023-11-17 RX ADMIN — POTASSIUM & SODIUM PHOSPHATES POWDER PACK 280-160-250 MG 2 PACKET: 280-160-250 PACK at 09:11

## 2023-11-17 RX ADMIN — POTASSIUM & SODIUM PHOSPHATES POWDER PACK 280-160-250 MG 2 PACKET: 280-160-250 PACK at 05:11

## 2023-11-17 RX ADMIN — CARVEDILOL 6.25 MG: 3.12 TABLET, FILM COATED ORAL at 05:11

## 2023-11-17 RX ADMIN — TIOTROPIUM BROMIDE INHALATION SPRAY 2 PUFF: 3.12 SPRAY, METERED RESPIRATORY (INHALATION) at 08:11

## 2023-11-17 RX ADMIN — DEXAMETHASONE SODIUM PHOSPHATE 6 MG: 4 INJECTION INTRA-ARTICULAR; INTRALESIONAL; INTRAMUSCULAR; INTRAVENOUS; SOFT TISSUE at 08:11

## 2023-11-17 RX ADMIN — ACETAMINOPHEN 1000 MG: 500 TABLET ORAL at 08:11

## 2023-11-17 RX ADMIN — FOLIC ACID 1 MG: 1 TABLET ORAL at 08:11

## 2023-11-17 RX ADMIN — QUETIAPINE FUMARATE 50 MG: 25 TABLET ORAL at 08:11

## 2023-11-17 NOTE — PT/OT/SLP EVAL
Occupational Therapy  Co- Evaluation/tx    Name: Jessica Floyd  MRN: [id number]  Admitting Diagnosis: COVID-19  Recent Surgery: * No surgery found *    Co-treatment performed due to patient's multiple deficits requiring two skilled therapiststo appropriately and safely assess patient's strength and endurance while facilitating functional tasks in addition to accommodating for patient's activity tolerance.     Recommendations:     Discharge Recommendations: Low Intensity Therapy  Discharge Equipment Recommendations:     Barriers to discharge:  None    Assessment:     Jessica Floyd is a 73 y.o. female with a medical diagnosis of COVID-19.  She presents with decreased endurance, mobility and self-care tasks as compared to PLOF. Pt. Engaged in session well on this date.  Patient would benefit from continued OT services to maximize level of safety and independence with self-care tasks.   . Performance deficits affecting function: weakness, impaired endurance, impaired self care skills, impaired functional mobility, impaired cardiopulmonary response to activity.      Rehab Prognosis: Good; patient would benefit from acute skilled OT services to address these deficits and reach maximum level of function.       Plan:     Patient to be seen 3 x/week to address the above listed problems via self-care/home management, therapeutic activities, therapeutic exercises  Plan of Care Expires: 12/17/23  Plan of Care Reviewed with: patient    Subjective     Chief Complaint: Pt. Reported she felt like she needed to be burped  Patient/Family Comments/goals: to get better and back home    Occupational Profile:  Living Environment: Pt. Resides with spouse in  house with 6 steps to enter and BHR. Pt. Has a WIS with a built in seat and grab bars. Pt. Has raised toilets. Spouse does work. Pt. Has other visitors during the day.   Previous level of function: Pt. Reported that she typically ambulates Independently and rarely uses a hurry cane. Pt.  Was I with ADL tasks. Pt. Has 2 dogs, grandchildren - driving  Roles and Routines: caretaker of self, grandmother, spouse, mother  Equipment Used at Home: cane, quad, walker, rolling, bath bench, grab bar, raised toilet (elevated toilet)  Assistance upon Discharge: spouse does work    Pain/Comfort:  Pain Rating 1: 0/10  Pain Rating Post-Intervention 1: 0/10    Patients cultural, spiritual, Worship conflicts given the current situation: no    Objective:     Communicated with: nurse prior to session.  Patient found supine with peripheral IV, pulse ox (continuous), telemetry, oxygen upon OT entry to room.    General Precautions: Standard, airborne, contact, fall, droplet  Orthopedic Precautions: N/A  Braces: N/A  Respiratory Status: Nasal cannula, flow 2 L/min    Occupational Performance:    Bed Mobility:    Patient completed Supine to Sit with supervision    Functional Mobility/Transfers:  Patient completed Sit <> Stand Transfer with contact guard assistance  with  rolling walker   Patient completed Toilet Transfer Stand Pivot technique with contact guard assistance with  rolling walker  Functional Mobility: Pt. Ambulated to and from the bathroom with RW and SBA    Activities of Daily Living:  Toileting: minimum assistance to manage brief back over hips in stand    Cognitive/Visual Perceptual:  Cognitive/Psychosocial Skills:     -       Oriented to: Person, Place, Time, and Situation   -       Follows Commands/attention:Follows multistep  commands  -       Communication: clear/fluent  -       Memory: No Deficits noted  -       Safety awareness/insight to disability: intact   -       Mood/Affect/Coping skills/emotional control: Appropriate to situation  Visual/Perceptual:      -Decreased peripheral vision in left eye    Physical Exam:  Balance: -       sit: good  stand: CGA with RW  Postural examination/scapula alignment:    -       Rounded shoulders  -       Posterior pelvic tilt  Skin integrity: Visible skin  intact  Upper Extremity Range of Motion:     -       Right Upper Extremity: Deficits: shoulder flexion to 90 degrees (reported RTC tear in right shoulder) elbow and below WNL  -       Left Upper Extremity: WNL   Strength:    -       Right Upper Extremity: WFL  -       Left Upper Extremity: WFL    AMPAC 6 Click ADL:  AMPAC Total Score: 19    Treatment & Education:  Pt. Educated on role of OT and POC    Patient left up in chair with all lines intact, call button in reach, and nurse notified    GOALS:   Multidisciplinary Problems       Occupational Therapy Goals          Problem: Occupational Therapy    Goal Priority Disciplines Outcome Interventions   Occupational Therapy Goal     OT, PT/OT Ongoing, Progressing    Description: Goals to be met by: 12-01-23     Patient will increase functional independence with ADLs by performing:    UE Dressing with Set-up Assistance.  LE Dressing with Supervision and AE as needed  Grooming while standing at sink with Supervision.  Toileting from bedside commode with Supervision for hygiene and clothing management.   Supine to sit with [place].  Stand pivot transfers with Supervision.  Toilet transfer to toilet with Supervision.                         History:     Past Medical History:   Diagnosis Date    Allergic rhinitis     Amblyopia     Carotid stenosis     Coronary atherosclerosis     [facility] 6/2014: 20% mLAD, 50% mCx, 20%, mRCA    Dyslipidemia     ESBL (extended spectrum beta-lactamase) producing bacteria infection     UTI    Hypertension     Nausea and vomiting 05/26/2017    Pulmonary emphysema 10/28/2023    SAH (subarachnoid hemorrhage) 08/24/2016 1999, s/p clipping    Subarachnoid hemorrhage due to ruptured aneurysm 1999         Past Surgical History:   Procedure Laterality Date    ADENOIDECTOMY      ANTERIOR CRUCIATE LIGAMENT REPAIR  2012    APPENDECTOMY      CATARACT EXTRACTION W/  INTRAOCULAR LENS IMPLANT Right 11/07/2022    Procedure: EXTRACTION, CATARACT,  WITH IOL INSERTION;  Surgeon: Higinio Cristina MD;  Location: [facility];  Service: Ophthalmology;  Laterality: Right;    CATARACT EXTRACTION W/  INTRAOCULAR LENS IMPLANT Left 11/21/2022    Procedure: EXTRACTION, CATARACT, WITH IOL INSERTION;  Surgeon: Higinio Cristina MD;  Location: [facility];  Service: Ophthalmology;  Laterality: Left;    CEREBRAL ANEURYSM REPAIR  1999    clip    DENTAL SURGERY      EYE SURGERY Bilateral     cataract removal and lens implants    HIP ARTHROPLASTY Left 05/28/2023    Procedure: TOTAL HIP ARTHROPLASTY;  Surgeon: Juan Wan MD;  Location: [address];  Service: Orthopedics;  Laterality: Left;    TONSILLECTOMY         Time Tracking:     OT Date of Treatment: 11/17/23  OT Start Time: 1346  OT Stop Time: 1419  OT Total Time (min): 33 min    Billable Minutes:Evaluation 15  Self Care/Home Management 18 11/17/2023

## 2023-11-17 NOTE — PROGRESS NOTES
[facility] - Intensive Care ([address] Medicine  Progress Note    Patient Name: Jessica Floyd  MRN: [id number]  Patient Class: IP- Inpatient   Admission Date: 11/15/2023  Length of Stay: 1 days  Attending Physician: Daljit Trivedi MD  Primary Care Provider: Effie Olmedo MD        Subjective:     Principal Problem:COVID-19        HPI:  Jessica Floyd is a 73 year old white woman with former smoking, coronary artery disease, dyslipidemia, hypertension, gout, anxiety, history of subarachnoid hemorrhage in 1999 due to ruptured aneurysm status post clipping, pulmonary emphysema, mild pulmonary hypertension, allergic rhinitis, stress induced reflex syncope, history of ESBL urinary tract infection, history of anterior cruciate ligament repair in 2012, history of left hip arthroplasty on 5/28/2023, history of carotid artery stent, history of appendectomy. She lives in [facility]. She is . Her primary care physician is Dr. Effie Olmedo. Her cardiologist is Dr. Michael Lal.   She was hospitalized at [facility] from 10/28/2023 to 10/31/2023 for shortness of breath with hypoxia. Chest CTA showed pulmonary emphysema. Echocardiogram showed mild pulmonary hypertension. She was prescribed fluticasone-vilanterol, tiotropium, folic acid, multivitamin, quetiapine, and 1 liter/minute of supplemental oxygen as needed.   She was seen at [facility] on 11/14/2023 for nasal congestion and cough for a week. She was found to have COVID-19. She received COVID-19 vaccinations on 1/9/2021, 1/30/2021, 8/23/2021, 4/20/2022, and 9/30/2023. She was prescribed fluticasone nasal spray.   She presented to [facility] Emergency Department on 11/15/2023 for acute shortness of breath while lying down, not improved with supplemental oxygen, and generalized weakness for the last few days. She continued to have chest and nasal congestion. Emergency medical  services found her to have oxygen saturation of 85%. She was given albuterol-ipratropium nebulizer and put on BiPAP.    In the emergency department, heart rate was 90 to 110. She had intermittent tachypnea. Oxygen saturation was 94% on room air. Labs showed WBC 27002/uL, from 9910 on 10/31/2023, sodium 130 mmol/L from 133, potassium 2.3 mmol/L,  pg/mL, D-dimer 1.71 mg/L. Chest X-ray showed increased lung markings similar to previous. She was given 40 mEq of potassium. She was admitted to Hospital Medicine Team D.       Overview/Hospital Course:  Chest CTA showed no pulmonary embolism. Lower extremity ultrasound showed no deep venous thrombosis. She was put on dexamethasone, remdesivir, and prophylactic dose enoxaparin.    Interval History: Patient eating dinner.  at bedside. Feeling better than yesterday.    Review of Systems   Respiratory:  Positive for cough and shortness of breath.    Neurological:  Negative for seizures and syncope.     Objective:     Vital Signs (Most Recent):  Temp: 98.2 °F (36.8 °C) (11/16/23 1500)  Pulse: 80 (11/16/23 1519)  Resp: 20 (11/16/23 1500)  BP: 128/62 (11/16/23 1600)  SpO2: 95 % (11/16/23 1519) Vital Signs (24h Range):  Temp:  [97.4 °F (36.3 °C)-99 °F (37.2 °C)] 98.2 °F (36.8 °C)  Pulse:  [] 80  Resp:  [13-29] 20  SpO2:  [94 %-99 %] 95 %  BP: (110-149)/(59-82) 128/62     Weight: 54.3 kg (119 lb 11.4 oz)  Body mass index is 20.55 kg/m².    Intake/Output Summary (Last 24 hours) at 11/16/2023 1657  Last data filed at 11/16/2023 1345  Gross per 24 hour   Intake 124.92 ml   Output 1350 ml   Net -1225.08 ml         Physical Exam  Vitals and nursing note reviewed.   Constitutional:       General: She is not in acute distress.     Appearance: She is well-developed and normal weight. She is not diaphoretic.      Interventions: Nasal cannula in place.   Pulmonary:      Effort: Pulmonary effort is normal. No respiratory distress.   Skin:     General: Skin is warm and dry.       Coloration: Skin is not jaundiced or pale.   Neurological:      Mental Status: She is alert and oriented to person, place, and time. Mental status is at baseline.      Motor: No seizure activity.   Psychiatric:         Attention and Perception: Attention normal.         Mood and Affect: Mood and affect normal.         Behavior: Behavior is cooperative.             Significant Labs: All pertinent labs within the past 24 hours have been reviewed.    Significant Imaging: I have reviewed all pertinent imaging results/findings within the past 24 hours.  X-Ray Chest 1 View 11/15/23: FINDINGS:   Increased markings in the lungs, similar to prior.   No consolidation, pleural effusion or pneumothorax.   Cardiomediastinal silhouette is unremarkable.   CTA Chest Non-Coronary (PE Studies) 11/16/23: FINDINGS:   Base of Neck: Right carotid artery stent.   Thoracic soft tissues: Normal.   Aorta/vasculature: Left-sided aortic arch.  No aneurysm.  Calcific atherosclerosis including branch vessels.   Heart: Left atrial prominence.  No effusion.  Coronary artery calcific atherosclerosis.  Aortic valve annular calcifications.   Pulmonary vasculature: Evaluation limited due to extensive respiratory motion and streak artifact from the right upper extremity in the field of view.  No large saddle embolus or pulmonary infarct.  Questionable filling defect in a right lower lobe segmental branch (coronal 601:158), favored artifactual.  Small thromboemboli are not excluded on the basis of this examination.  No evidence for right heart strain.  Main pulmonary trunk measures at the upper limit of normal.   Aaliyah/Mediastinum: No pathologic pooja enlargement.   Airways: Patent.   Lungs/Pleura: Severely limited evaluation due to respiratory motion.  Suspected ground-glass attenuation with atelectatic versus consolidative change involving the lingula, right middle lobe, and right lung base.   Esophagus: Normal.   Upper Abdomen: Stable left  hepatic lobe hypodensity.   Bones: No acute displaced fracture.  No suspicious lytic or sclerotic lesions.  Superior subluxation of the humeral head with respect to the glenoid, which appears to abut the inferior chromium, compatible with rotator cuff pathology.  Mild height loss involving the lower thoracic/upper lumbar vertebral bodies.   Impression:  1. Limited examination as above.  No large saddle embolus or pulmonary infarct.  No evidence for right heart strain. Questionable filling defect in a right lower lobe segmental branch (coronal 601:158), favored artifactual. Small thromboemboli are not excluded on the basis of this examination.   2. Patchy ground-glass attenuation with atelectatic versus consolidative change, noting significant motion artifact.  Nonspecific appearance, may reflect multifocal infectious/inflammatory process.   3. Additional findings as above.   US Lower Extremity Veins Bilateral 11/16/23: FINDINGS:   Right thigh veins: The common femoral, femoral, popliteal, upper greater saphenous, and deep femoral veins are patent and free of thrombus. The veins are normally compressible and have normal phasic flow and augmentation response.   Right calf veins: The visualized calf veins are patent.   Left thigh veins: The common femoral, femoral, popliteal, upper greater saphenous, and deep femoral veins are patent and free of thrombus. The veins are normally compressible and have normal phasic flow and augmentation response.   Left calf veins: The visualized calf veins are patent.   Miscellaneous: None   Impression:  No evidence of deep venous thrombosis in either lower extremity.     Assessment/Plan:      * COVID-19  Patient is identified as Severe COVID-19 based on hypoxemia with O2 saturations <94% on room air or on ambulation   Initiate standard COVID protocols; COVID-19 testing Collection Date: 5/30/2023 Collection Time:   8:46 AM ,Infection Control notification  and isolation- respiratory,  contact and droplet per protocol    Diagnostics: CBC, CMP, Procalcitonin, BNP, Troponin, ECG, and Portable CXR    Management: Initiate targeted therapy with Remdesivir, 200mg IV x1, followed by 100mg IV daily x5 days total and Dexamethasone PO/IV 6mg daily x10 days, Maintain oxygen saturations 92-96% via Nasal Cannula  LPM and monitor with continuous/intermittent pulse oximetry. , Inhaled bronchodilators as needed for shortness of breath., and Continuous cardiac monitoring.    Advance Care Planning Current advance care plan has not been discussed with patient/family/POA. Continue status of Full Code for now     Pulmonary emphysema  Continue home tiotropium, fluticasone-vilanterol. Add albuterol inhaler prn and prescribe it.    Acute hypoxic respiratory failure  Patient with Hypoxic Respiratory failure which is Acute.  she is on home oxygen at recently started on 1L NC PRN at home LPM. Supplemental oxygen was provided and noted-   Found hypoxic to O2 sat of 85% at home on RA with EMS, Improved s/p bipap.     Signs/symptoms of respiratory failure include- tachypnea. Contributing diagnoses includes - COPD and Covid-19  Labs and images were reviewed. Patient Has not had a recent ABG. Will treat underlying causes and adjust management of respiratory failure as follows-     -Leukocytosis of 23k on admission, procal negative   -Covid positive. D-dimer 1.71, likely elevated in setting of acute covid infection. Age adjust d-dimer makes VTA unlikely. Wells' score for PE is 1.5. .   -CTA chest without large PE but or right heart strain but small thromboemboli are not excluded. Patchy ground-glass attenuation with atelectatic versus consolidative.  -Check lower extremity US given inconclusive CTA   -Recent TTE (10/29/23) reviewed.   -Steroids/Remdesivir for Covid  -PRN duo nebs, mucinex, tessalon perles   -Continue home singular and inhalers         Severe anxiety  Continue home quetiapine.    Gout  Continue home  allopurinol.    Hyponatremia  Improving.     Hypertension  Continue home amlodipine, carvedilol. Holding home hydrochlorothiazide. Monitor blood pressures.    Hypokalemia  Give potassium bicarbonate.    Gastroesophageal reflux disease without esophagitis  Continue home pantoprazole.    Dyslipidemia  Continue home atorvastatin.    Coronary artery disease involving native coronary artery of native heart without angina pectoris  Continue home aspirin and atorvastatin.      VTE Risk Mitigation (From admission, onward)           Ordered     enoxaparin injection 40 mg  Daily         11/15/23 2257     IP VTE HIGH RISK PATIENT  Once         11/16/23 0049     Place sequential compression device  Until discontinued         11/16/23 0049     Place sequential compression device  Until discontinued         11/15/23 2257                    Discharge Planning   DEBBIE: 11/18/2023     Code Status: Full Code   Is the patient medically ready for discharge?: No    Reason for patient still in hospital (select all that apply): Patient unstable, Patient trending condition, and Treatment                     Daljit Trivedi MD  Department of Hospital Medicine   [facility] - Intensive Care ([place]

## 2023-11-17 NOTE — NURSING
AO on 3L titrated down to 2L via NC. Tolerated well sats >95%. C/o congested cough happy with liquid guaifenesin and tessalon. Up to bedside commode. X 1 this morning small BM. Notable weakness and anxiety upon transfer to commode. Purewick changed; UOP 1L cloudy in color. Refused bath will complete later on in the day. No other concerns call bell within reach.

## 2023-11-17 NOTE — SUBJECTIVE & OBJECTIVE
Interval History: Off nasal cannula and O2 sat normal while sitting. Heartburn is bothering her. Already takes pantoprazole scheduled. Wants prn dose of quetiapine during the day. Coughed up a lot of phlegm since yesterday. Feeling better.    Review of Systems   Respiratory:  Positive for cough. Negative for shortness of breath.    Neurological:  Negative for seizures and syncope.   Psychiatric/Behavioral:  The patient is nervous/anxious.      Objective:     Vital Signs (Most Recent):  Temp: 98 °F (36.7 °C) (11/17/23 1315)  Pulse: 85 (11/17/23 1534)  Resp: (!) 22 (11/17/23 1315)  BP: (!) 114/55 (11/17/23 1315)  SpO2: (!) 94 % (11/17/23 1545) Vital Signs (24h Range):  Temp:  [97.9 °F (36.6 °C)-98.2 °F (36.8 °C)] 98 °F (36.7 °C)  Pulse:  [61-87] 85  Resp:  [17-22] 22  SpO2:  [94 %-98 %] 94 %  BP: (114-146)/(55-74) 114/55     Weight: 54.3 kg (119 lb 11.4 oz)  Body mass index is 20.55 kg/m².    Intake/Output Summary (Last 24 hours) at 11/17/2023 1646  Last data filed at 11/17/2023 1306  Gross per 24 hour   Intake 300 ml   Output 1600 ml   Net -1300 ml           Physical Exam  Vitals and nursing note reviewed.   Constitutional:       General: She is not in acute distress.     Appearance: She is well-developed and normal weight. She is not diaphoretic.      Interventions: Nasal cannula in place.   Pulmonary:      Effort: Pulmonary effort is normal. No respiratory distress.   Skin:     General: Skin is warm and dry.      Coloration: Skin is not jaundiced or pale.   Neurological:      Mental Status: She is alert and oriented to person, place, and time. Mental status is at baseline.      Motor: No seizure activity.   Psychiatric:         Attention and Perception: Attention normal.         Mood and Affect: Mood and affect normal.         Behavior: Behavior is cooperative.             Significant Labs: All pertinent labs within the past 24 hours have been reviewed.    Significant Imaging: I have reviewed all pertinent imaging  results/findings within the past 24 hours.

## 2023-11-17 NOTE — PLAN OF CARE
Problem: Occupational Therapy  Goal: Occupational Therapy Goal  Description: Goals to be met by: 12-01-23     Patient will increase functional independence with ADLs by performing:    UE Dressing with Set-up Assistance.  LE Dressing with Supervision and AE as needed  Grooming while standing at sink with Supervision.  Toileting from bedside commode with Supervision for hygiene and clothing management.   Supine to sit with [place].  Stand pivot transfers with Supervision.  Toilet transfer to toilet with Supervision.    Outcome: Ongoing, Progressing

## 2023-11-17 NOTE — PLAN OF CARE
Eval completed; POC established    Problem: Physical Therapy  Goal: Physical Therapy Goal  Description: Goals to be met by: 12/15/2023     Patient will increase functional independence with mobility by performin. Supine to sit with [place]  2. Sit to stand transfer with [place]  3. Gait  x 100 feet with Stand-by Assistance using LRAD.   4. Ascend/descend 6 stairs with bilateral Handrails Contact Guard Assistance using LRAD .     Outcome: Ongoing, Progressing

## 2023-11-17 NOTE — PLAN OF CARE
Problem: Adult Inpatient Plan of Care  Goal: Plan of Care Review  11/17/2023 1141 by Linda Chinchilla RN  Outcome: Ongoing, Progressing  11/17/2023 1141 by Linda Chinchilla RN  Outcome: Ongoing, Progressing  Goal: Patient-Specific Goal (Individualized)  11/17/2023 1141 by Linda Chinchilla RN  Outcome: Ongoing, Progressing  11/17/2023 1141 by Linda Chinchilla RN  Outcome: Ongoing, Progressing  Goal: Absence of Hospital-Acquired Illness or Injury  11/17/2023 1141 by Linda Chinchilla RN  Outcome: Ongoing, Progressing  11/17/2023 1141 by Linda Chinchilla RN  Outcome: Ongoing, Progressing  Goal: Optimal Comfort and Wellbeing  11/17/2023 1141 by Linda Chinchilla RN  Outcome: Ongoing, Progressing  11/17/2023 1141 by Linda Chinchilla RN  Outcome: Ongoing, Progressing  Goal: Readiness for Transition of Care  11/17/2023 1141 by Linda Chinchilla RN  Outcome: Ongoing, Progressing  11/17/2023 1141 by Linda Chinchilla RN  Problem: Infection  Goal: Absence of Infection Signs and Symptoms  11/17/2023 1141 by Linda Chinchilla RN  Outcome: Ongoing, Progressing  11/17/2023 1141 by Linda Chinchilla RN  Outcome: Ongoing, Progressing     Problem: Impaired Wound Healing  Goal: Optimal Wound Healing  11/17/2023 1141 by Linda Chinchilla RN  Outcome: Ongoing, Progressing  11/17/2023 1141 by Linda Chinchilla RN  Outcome: Ongoing, Progressing     Problem: Gas Exchange Impaired  Goal: Optimal Gas Exchange  Outcome: Ongoing, Progressing     Outcome: Ongoing, Progressing

## 2023-11-17 NOTE — PLAN OF CARE
Problem: Occupational Therapy  Goal: Occupational Therapy Goal  Description: Goals to be met by: 12-01-23     Patient will increase functional independence with ADLs by performing:    UE Dressing with Set-up Assistance.  LE Dressing with Supervision and AE as needed  Grooming while standing at sink with Supervision.  Toileting from bedside commode with Supervision for hygiene and clothing management.   Supine to sit with [place].  Stand pivot transfers with Supervision.  Toilet transfer to toilet with Supervision.    11/17/2023 1439 by Mona Kim LOTR  Outcome: Ongoing, Progressing  11/17/2023 1429 by Mona Kim LOTR  Outcome: Ongoing, Progressing

## 2023-11-17 NOTE — PROGRESS NOTES
[facility] - Intensive Care ([address] Medicine  Progress Note    Patient Name: Jessica Floyd  MRN: [id number]  Patient Class: IP- Inpatient   Admission Date: 11/15/2023  Length of Stay: 2 days  Attending Physician: Daljit Trivedi MD  Primary Care Provider: Effie Olmedo MD        Subjective:     Principal Problem:COVID-19        HPI:  Jessica Floyd is a 73 year old white woman with former smoking, coronary artery disease, dyslipidemia, hypertension, gout, anxiety, history of subarachnoid hemorrhage in 1999 due to ruptured aneurysm status post clipping, pulmonary emphysema, mild pulmonary hypertension, allergic rhinitis, stress induced reflex syncope, history of ESBL urinary tract infection, history of anterior cruciate ligament repair in 2012, history of left hip arthroplasty on 5/28/2023, history of carotid artery stent, history of appendectomy. She lives in [facility]. She is . Her primary care physician is Dr. Effie Olmedo. Her cardiologist is Dr. Michael Lal.   She was hospitalized at [facility] from 10/28/2023 to 10/31/2023 for shortness of breath with hypoxia. Chest CTA showed pulmonary emphysema. Echocardiogram showed mild pulmonary hypertension. She was prescribed fluticasone-vilanterol, tiotropium, folic acid, multivitamin, quetiapine, and 1 liter/minute of supplemental oxygen as needed.   She was seen at [facility] on 11/14/2023 for nasal congestion and cough for a week. She was found to have COVID-19. She received COVID-19 vaccinations on 1/9/2021, 1/30/2021, 8/23/2021, 4/20/2022, and 9/30/2023. She was prescribed fluticasone nasal spray.   She presented to [facility] Emergency Department on 11/15/2023 for acute shortness of breath while lying down, not improved with supplemental oxygen, and generalized weakness for the last few days. She continued to have chest and nasal congestion. Emergency medical  services found her to have oxygen saturation of 85%. She was given albuterol-ipratropium nebulizer and put on BiPAP.    In the emergency department, heart rate was 90 to 110. She had intermittent tachypnea. Oxygen saturation was 94% on room air. Labs showed WBC 08342/uL, from 9910 on 10/31/2023, sodium 130 mmol/L from 133, potassium 2.3 mmol/L,  pg/mL, D-dimer 1.71 mg/L. Chest X-ray showed increased lung markings similar to previous. She was given 40 mEq of potassium. She was admitted to Hospital Medicine Team D.       Overview/Hospital Course:  Chest CTA showed no pulmonary embolism. Lower extremity ultrasound showed no deep venous thrombosis. She was put on dexamethasone, remdesivir, and prophylactic dose enoxaparin.    Interval History: Off nasal cannula and O2 sat normal while sitting. Heartburn is bothering her. Already takes pantoprazole scheduled. Wants prn dose of quetiapine during the day. Coughed up a lot of phlegm since yesterday. Feeling better.    Review of Systems   Respiratory:  Positive for cough. Negative for shortness of breath.    Neurological:  Negative for seizures and syncope.   Psychiatric/Behavioral:  The patient is nervous/anxious.      Objective:     Vital Signs (Most Recent):  Temp: 98 °F (36.7 °C) (11/17/23 1315)  Pulse: 85 (11/17/23 1534)  Resp: (!) 22 (11/17/23 1315)  BP: (!) 114/55 (11/17/23 1315)  SpO2: (!) 94 % (11/17/23 1545) Vital Signs (24h Range):  Temp:  [97.9 °F (36.6 °C)-98.2 °F (36.8 °C)] 98 °F (36.7 °C)  Pulse:  [61-87] 85  Resp:  [17-22] 22  SpO2:  [94 %-98 %] 94 %  BP: (114-146)/(55-74) 114/55     Weight: 54.3 kg (119 lb 11.4 oz)  Body mass index is 20.55 kg/m².    Intake/Output Summary (Last 24 hours) at 11/17/2023 1646  Last data filed at 11/17/2023 1306  Gross per 24 hour   Intake 300 ml   Output 1600 ml   Net -1300 ml           Physical Exam  Vitals and nursing note reviewed.   Constitutional:       General: She is not in acute distress.     Appearance: She is  well-developed and normal weight. She is not diaphoretic.      Interventions: Nasal cannula in place.   Pulmonary:      Effort: Pulmonary effort is normal. No respiratory distress.   Skin:     General: Skin is warm and dry.      Coloration: Skin is not jaundiced or pale.   Neurological:      Mental Status: She is alert and oriented to person, place, and time. Mental status is at baseline.      Motor: No seizure activity.   Psychiatric:         Attention and Perception: Attention normal.         Mood and Affect: Mood and affect normal.         Behavior: Behavior is cooperative.             Significant Labs: All pertinent labs within the past 24 hours have been reviewed.    Significant Imaging: I have reviewed all pertinent imaging results/findings within the past 24 hours.    Assessment/Plan:      * COVID-19  Patient is identified as Severe COVID-19 based on hypoxemia with O2 saturations <94% on room air or on ambulation   Initiate standard COVID protocols; COVID-19 testing Collection Date: 5/30/2023 Collection Time:   8:46 AM ,Infection Control notification  and isolation- respiratory, contact and droplet per protocol    Diagnostics: CBC, CMP, Procalcitonin, BNP, Troponin, ECG, and Portable CXR    Management: Initiate targeted therapy with Remdesivir, 200mg IV x1, followed by 100mg IV daily x5 days total and Dexamethasone PO/IV 6mg daily x10 days, Maintain oxygen saturations 92-96% via Nasal Cannula  LPM and monitor with continuous/intermittent pulse oximetry. , Inhaled bronchodilators as needed for shortness of breath., and Continuous cardiac monitoring.    Advance Care Planning Current advance care plan has not been discussed with patient/family/POA. Continue status of Full Code for now     Pulmonary emphysema  Continue home tiotropium, fluticasone-vilanterol. Add albuterol inhaler prn and prescribe it.    Acute hypoxic respiratory failure  Patient with Hypoxic Respiratory failure which is Acute.  she is on home  oxygen at recently started on 1L NC PRN at home LPM. Supplemental oxygen was provided and noted-   Found hypoxic to O2 sat of 85% at home on RA with EMS, Improved s/p bipap.     Signs/symptoms of respiratory failure include- tachypnea. Contributing diagnoses includes - COPD and Covid-19  Labs and images were reviewed. Patient Has not had a recent ABG. Will treat underlying causes and adjust management of respiratory failure as follows-     -Leukocytosis of 23k on admission, procal negative   -Covid positive. D-dimer 1.71, likely elevated in setting of acute covid infection. Age adjust d-dimer makes VTA unlikely. Wells' score for PE is 1.5. .   -CTA chest without large PE but or right heart strain but small thromboemboli are not excluded. Patchy ground-glass attenuation with atelectatic versus consolidative.  -Check lower extremity US given inconclusive CTA   -Recent TTE (10/29/23) reviewed.   -Steroids/Remdesivir for Covid  -PRN duo nebs, mucinex, tessalon perles   -Continue home singular and inhalers         Severe anxiety  Continue home quetiapine. Add prn dose.    Gout  Continue home allopurinol.    Hypertension  Continue home amlodipine, carvedilol. Holding home hydrochlorothiazide. Monitor blood pressures.    Gastroesophageal reflux disease without esophagitis  Continue home pantoprazole. Mylanta and Tums prn.    Dyslipidemia  Continue home atorvastatin.    Coronary artery disease involving native coronary artery of native heart without angina pectoris  Continue home aspirin and atorvastatin.      VTE Risk Mitigation (From admission, onward)           Ordered     enoxaparin injection 40 mg  Daily         11/15/23 2257     IP VTE HIGH RISK PATIENT  Once         11/16/23 0049     Place sequential compression device  Until discontinued         11/16/23 0049     Place sequential compression device  Until discontinued         11/15/23 2257                    Discharge Planning   DEBBIE: 11/18/2023     Code  Status: Full Code   Is the patient medically ready for discharge?: No    Reason for patient still in hospital (select all that apply): Patient trending condition and Treatment                     Daljit Trivedi MD  Department of Hospital Medicine   [facility] - Intensive Care ([place]-14)

## 2023-11-17 NOTE — PT/OT/SLP EVAL
Physical Therapy Co-Evaluation    Patient Name:  Jessica Floyd   MRN:  [id number]    Recommendations:     Discharge Recommendations: Low Intensity Therapy   Discharge Equipment Recommendations: none   Barriers to discharge: None    Co-eval performed to appropriately and safely assess patient's strength and endurance while facilitating functional tasks in addition to accommodating for patient's activity/pain tolerance.    Assessment:     Jessica Floyd is a 73 y.o. female admitted with a medical diagnosis of COVID-19.  She presents with the following impairments/functional limitations: weakness, impaired balance, impaired endurance, impaired cardiopulmonary response to activity, impaired self care skills, impaired functional mobility, gait instability. Pt with decreased mobility compared to PLOF. Pt ambulated to/from bathroom using RW with SBA. Patient currently demonstrates a need for low intensity therapy on a scheduled basis secondary to a decline in functional status due to illness     Rehab Prognosis: Good; patient would benefit from acute skilled PT services to address these deficits and reach maximum level of function.    Recent Surgery: * No surgery found *      Plan:     During this hospitalization, patient to be seen 3 x/week to address the identified rehab impairments via gait training, therapeutic activities, therapeutic exercises, neuromuscular re-education and progress toward the following goals:    Plan of Care Expires:  12/15/23    Subjective     Chief Complaint: None stated  Patient/Family Comments/goals: To go to bathroom  Pain/Comfort:  Pain Rating 1: 0/10    Patients cultural, spiritual, Quaker conflicts given the current situation: no    Living Environment:  Pt lives with spouse in [facility] with 6 EMILY to enter and BHR. WIS with seat and grab bars. Spouse works during the day. Pt reports having cane and RW, but rarely uses them.   Prior to admission, patients level of function was independent.   Equipment used at home: cane, quad, walker, rolling, bath bench, grab bar, raised toilet.  DME owned (not currently used): none.  Upon discharge, patient will have assistance from spouse.    Objective:     Communicated with nurse prior to session.  Patient found HOB elevated with peripheral IV, pulse ox (continuous), telemetry, oxygen  upon PT entry to room.    General Precautions: Standard, airborne, contact, fall, droplet  Orthopedic Precautions:N/A   Braces: N/A  Respiratory Status: Nasal cannula, flow 2 L/min    Exams:  Cognitive Exam:  Patient is oriented to Person, Place, Time, and Situation  Sensation:    -       Intact  RLE ROM: WFL  RLE Strength: grossly 4+/5  LLE ROM: WFL  LLE Strength: grossly 4+/5    Functional Mobility:  Bed Mobility:     Supine to Sit: stand by assistance  Transfers:     Sit to Stand:  contact guard assistance with rolling walker  Toilet Transfer: contact guard assistance with  rolling walker  using  Step Transfer  Gait: 16ft using RW with SBA  No LOB or SOB      AM-PAC 6 CLICK MOBILITY  Total Score:17       Treatment & Education:  Toileting and pericare with SBA  Patient educated on role of therapy, goals of session, and benefits of mobilizing.   Patient educated on importance of sitting up in chair.  Discussed PT plan of care during hospitalization.   Patient educated on calling for assistance.   All questions answered within PT scope of practice.     Patient left up in chair with all lines intact, call button in reach, nurse notified, and spouse present.    GOALS:   Multidisciplinary Problems       Physical Therapy Goals          Problem: Physical Therapy    Goal Priority Disciplines Outcome Goal Variances Interventions   Physical Therapy Goal     PT, PT/OT Ongoing, Progressing     Description: Goals to be met by: 12/15/2023     Patient will increase functional independence with mobility by performin. Supine to sit with [place]  2. Sit to stand transfer with  [place]  3. Gait  x 100 feet with Stand-by Assistance using LRAD.   4. Ascend/descend 6 stairs with bilateral Handrails Contact Guard Assistance using LRAD .                          History:     Past Medical History:   Diagnosis Date    Allergic rhinitis     Amblyopia     Carotid stenosis     Coronary atherosclerosis     [facility] 6/2014: 20% mLAD, 50% mCx, 20%, mRCA    Dyslipidemia     ESBL (extended spectrum beta-lactamase) producing bacteria infection     UTI    Hypertension     Nausea and vomiting 05/26/2017    Pulmonary emphysema 10/28/2023    SAH (subarachnoid hemorrhage) 08/24/2016 1999, s/p clipping    Subarachnoid hemorrhage due to ruptured aneurysm 1999       Past Surgical History:   Procedure Laterality Date    ADENOIDECTOMY      ANTERIOR CRUCIATE LIGAMENT REPAIR  2012    APPENDECTOMY      CATARACT EXTRACTION W/  INTRAOCULAR LENS IMPLANT Right 11/07/2022    Procedure: EXTRACTION, CATARACT, WITH IOL INSERTION;  Surgeon: Higinio Cristina MD;  Location: [facility];  Service: Ophthalmology;  Laterality: Right;    CATARACT EXTRACTION W/  INTRAOCULAR LENS IMPLANT Left 11/21/2022    Procedure: EXTRACTION, CATARACT, WITH IOL INSERTION;  Surgeon: Higinio Cristina MD;  Location: [facility];  Service: Ophthalmology;  Laterality: Left;    CEREBRAL ANEURYSM REPAIR  1999    clip    DENTAL SURGERY      EYE SURGERY Bilateral     cataract removal and lens implants    HIP ARTHROPLASTY Left 05/28/2023    Procedure: TOTAL HIP ARTHROPLASTY;  Surgeon: Juan Wan MD;  Location: [address];  Service: Orthopedics;  Laterality: Left;    TONSILLECTOMY         Time Tracking:     PT Received On: 11/17/23  PT Start Time: 1346     PT Stop Time: 1419  PT Total Time (min): 33 min     Billable Minutes: Evaluation 10, Gait Training 10, and Therapeutic Activity 13      11/17/2023

## 2023-11-18 LAB
ALBUMIN SERPL BCP-MCNC: 2.8 G/DL (ref 3.5–5.2)
ALP SERPL-CCNC: 72 U/L (ref 55–135)
ALT SERPL W/O P-5'-P-CCNC: 20 U/L (ref 10–44)
ANION GAP SERPL CALC-SCNC: 9 MMOL/L (ref 8–16)
ANISOCYTOSIS BLD QL SMEAR: SLIGHT
AST SERPL-CCNC: 31 U/L (ref 10–40)
BASOPHILS # BLD AUTO: 0.17 K/UL (ref 0–0.2)
BASOPHILS NFR BLD: 1 % (ref 0–1.9)
BILIRUB SERPL-MCNC: 0.6 MG/DL (ref 0.1–1)
BUN SERPL-MCNC: 16 MG/DL (ref 8–23)
CALCIUM SERPL-MCNC: 8.9 MG/DL (ref 8.7–10.5)
CHLORIDE SERPL-SCNC: 100 MMOL/L (ref 95–110)
CO2 SERPL-SCNC: 31 MMOL/L (ref 23–29)
CREAT SERPL-MCNC: 0.7 MG/DL (ref 0.5–1.4)
DIFFERENTIAL METHOD: ABNORMAL
EOSINOPHIL # BLD AUTO: 0 K/UL (ref 0–0.5)
EOSINOPHIL NFR BLD: 0 % (ref 0–8)
ERYTHROCYTE [DISTWIDTH] IN BLOOD BY AUTOMATED COUNT: 19.8 % (ref 11.5–14.5)
EST. GFR  (NO RACE VARIABLE): >60 ML/MIN/1.73 M^2
ESTIMATED AVG GLUCOSE: 103 MG/DL (ref 68–131)
GLUCOSE SERPL-MCNC: 93 MG/DL (ref 70–110)
HBA1C MFR BLD: 5.2 % (ref 4–5.6)
HCT VFR BLD AUTO: 36 % (ref 37–48.5)
HGB BLD-MCNC: 11.7 G/DL (ref 12–16)
IMM GRANULOCYTES # BLD AUTO: 0.46 K/UL (ref 0–0.04)
IMM GRANULOCYTES NFR BLD AUTO: 2.7 % (ref 0–0.5)
LYMPHOCYTES # BLD AUTO: 1.5 K/UL (ref 1–4.8)
LYMPHOCYTES NFR BLD: 8.7 % (ref 18–48)
MAGNESIUM SERPL-MCNC: 1.8 MG/DL (ref 1.6–2.6)
MCH RBC QN AUTO: 34.4 PG (ref 27–31)
MCHC RBC AUTO-ENTMCNC: 32.5 G/DL (ref 32–36)
MCV RBC AUTO: 106 FL (ref 82–98)
MONOCYTES # BLD AUTO: 2.4 K/UL (ref 0.3–1)
MONOCYTES NFR BLD: 13.9 % (ref 4–15)
NEUTROPHILS # BLD AUTO: 12.7 K/UL (ref 1.8–7.7)
NEUTROPHILS NFR BLD: 73.7 % (ref 38–73)
NRBC BLD-RTO: 0 /100 WBC
OVALOCYTES BLD QL SMEAR: ABNORMAL
PHOSPHATE SERPL-MCNC: 3.6 MG/DL (ref 2.7–4.5)
PLATELET # BLD AUTO: 349 K/UL (ref 150–450)
PLATELET BLD QL SMEAR: ABNORMAL
PMV BLD AUTO: 10.8 FL (ref 9.2–12.9)
POCT GLUCOSE: 104 MG/DL (ref 70–110)
POCT GLUCOSE: 117 MG/DL (ref 70–110)
POCT GLUCOSE: 138 MG/DL (ref 70–110)
POCT GLUCOSE: 185 MG/DL (ref 70–110)
POCT GLUCOSE: 226 MG/DL (ref 70–110)
POIKILOCYTOSIS BLD QL SMEAR: SLIGHT
POTASSIUM SERPL-SCNC: 4.2 MMOL/L (ref 3.5–5.1)
PROT SERPL-MCNC: 6.6 G/DL (ref 6–8.4)
RBC # BLD AUTO: 3.4 M/UL (ref 4–5.4)
SODIUM SERPL-SCNC: 140 MMOL/L (ref 136–145)
WBC # BLD AUTO: 17.17 K/UL (ref 3.9–12.7)

## 2023-11-18 PROCEDURE — 83036 HEMOGLOBIN GLYCOSYLATED A1C: CPT | Mod: HCNC | Performed by: HOSPITALIST

## 2023-11-18 PROCEDURE — 83735 ASSAY OF MAGNESIUM: CPT | Mod: HCNC

## 2023-11-18 PROCEDURE — 20600001 HC STEP DOWN PRIVATE ROOM: Mod: HCNC

## 2023-11-18 PROCEDURE — 84100 ASSAY OF PHOSPHORUS: CPT | Mod: HCNC

## 2023-11-18 PROCEDURE — 25000003 PHARM REV CODE 250: Mod: HCNC

## 2023-11-18 PROCEDURE — 85025 COMPLETE CBC W/AUTO DIFF WBC: CPT | Mod: HCNC

## 2023-11-18 PROCEDURE — 27000207 HC ISOLATION: Mod: HCNC

## 2023-11-18 PROCEDURE — 36415 COLL VENOUS BLD VENIPUNCTURE: CPT | Mod: HCNC | Performed by: HOSPITALIST

## 2023-11-18 PROCEDURE — 94640 AIRWAY INHALATION TREATMENT: CPT | Mod: HCNC

## 2023-11-18 PROCEDURE — 80053 COMPREHEN METABOLIC PANEL: CPT | Mod: HCNC

## 2023-11-18 PROCEDURE — 99900035 HC TECH TIME PER 15 MIN (STAT): Mod: HCNC

## 2023-11-18 PROCEDURE — 63600175 PHARM REV CODE 636 W HCPCS: Mod: HCNC | Performed by: HOSPITALIST

## 2023-11-18 PROCEDURE — 63600175 PHARM REV CODE 636 W HCPCS: Mod: HCNC

## 2023-11-18 PROCEDURE — 36415 COLL VENOUS BLD VENIPUNCTURE: CPT | Mod: HCNC

## 2023-11-18 PROCEDURE — 94761 N-INVAS EAR/PLS OXIMETRY MLT: CPT | Mod: HCNC

## 2023-11-18 PROCEDURE — 25000003 PHARM REV CODE 250: Mod: HCNC | Performed by: HOSPITALIST

## 2023-11-18 RX ORDER — IBUPROFEN 200 MG
16 TABLET ORAL
Status: DISCONTINUED | OUTPATIENT
Start: 2023-11-18 | End: 2023-11-19

## 2023-11-18 RX ORDER — INSULIN ASPART 100 [IU]/ML
0-5 INJECTION, SOLUTION INTRAVENOUS; SUBCUTANEOUS
Status: DISCONTINUED | OUTPATIENT
Start: 2023-11-18 | End: 2023-11-19

## 2023-11-18 RX ORDER — GLUCAGON 1 MG
1 KIT INJECTION
Status: DISCONTINUED | OUTPATIENT
Start: 2023-11-18 | End: 2023-11-19

## 2023-11-18 RX ORDER — CALCIUM CARBONATE 200(500)MG
1000 TABLET,CHEWABLE ORAL 3 TIMES DAILY PRN
Status: DISCONTINUED | OUTPATIENT
Start: 2023-11-18 | End: 2023-11-19 | Stop reason: HOSPADM

## 2023-11-18 RX ORDER — IBUPROFEN 200 MG
24 TABLET ORAL
Status: DISCONTINUED | OUTPATIENT
Start: 2023-11-18 | End: 2023-11-19

## 2023-11-18 RX ADMIN — BENZONATATE 200 MG: 100 CAPSULE ORAL at 12:11

## 2023-11-18 RX ADMIN — AMLODIPINE BESYLATE 5 MG: 5 TABLET ORAL at 08:11

## 2023-11-18 RX ADMIN — QUETIAPINE FUMARATE 50 MG: 25 TABLET ORAL at 09:11

## 2023-11-18 RX ADMIN — ALUMINUM HYDROXIDE, MAGNESIUM HYDROXIDE, AND SIMETHICONE 30 ML: 200; 200; 20 SUSPENSION ORAL at 10:11

## 2023-11-18 RX ADMIN — CARVEDILOL 6.25 MG: 3.12 TABLET, FILM COATED ORAL at 04:11

## 2023-11-18 RX ADMIN — PANTOPRAZOLE SODIUM 40 MG: 40 TABLET, DELAYED RELEASE ORAL at 08:11

## 2023-11-18 RX ADMIN — SIMETHICONE 80 MG: 80 TABLET, CHEWABLE ORAL at 12:11

## 2023-11-18 RX ADMIN — FLUTICASONE PROPIONATE 100 MCG: 50 SPRAY, METERED NASAL at 08:11

## 2023-11-18 RX ADMIN — BENZONATATE 200 MG: 100 CAPSULE ORAL at 06:11

## 2023-11-18 RX ADMIN — ANTACID TABLETS 500 MG: 500 TABLET, CHEWABLE ORAL at 08:11

## 2023-11-18 RX ADMIN — GUAIFENESIN 400 MG: 200 SOLUTION ORAL at 06:11

## 2023-11-18 RX ADMIN — SIMETHICONE 80 MG: 80 TABLET, CHEWABLE ORAL at 04:11

## 2023-11-18 RX ADMIN — ACETAMINOPHEN 1000 MG: 500 TABLET ORAL at 08:11

## 2023-11-18 RX ADMIN — INSULIN ASPART 2 UNITS: 100 INJECTION, SOLUTION INTRAVENOUS; SUBCUTANEOUS at 04:11

## 2023-11-18 RX ADMIN — ACETAMINOPHEN 1000 MG: 500 TABLET ORAL at 04:11

## 2023-11-18 RX ADMIN — GUAIFENESIN 400 MG: 200 SOLUTION ORAL at 04:11

## 2023-11-18 RX ADMIN — GUAIFENESIN 400 MG: 200 SOLUTION ORAL at 12:11

## 2023-11-18 RX ADMIN — FOLIC ACID 1 MG: 1 TABLET ORAL at 08:11

## 2023-11-18 RX ADMIN — Medication 6 MG: at 09:11

## 2023-11-18 RX ADMIN — BENZONATATE 200 MG: 100 CAPSULE ORAL at 09:11

## 2023-11-18 RX ADMIN — ATORVASTATIN CALCIUM 80 MG: 40 TABLET, FILM COATED ORAL at 08:11

## 2023-11-18 RX ADMIN — ANTACID TABLETS 1000 MG: 500 TABLET, CHEWABLE ORAL at 12:11

## 2023-11-18 RX ADMIN — ALLOPURINOL 200 MG: 100 TABLET ORAL at 08:11

## 2023-11-18 RX ADMIN — THERA TABS 1 TABLET: TAB at 08:11

## 2023-11-18 RX ADMIN — TIOTROPIUM BROMIDE INHALATION SPRAY 2 PUFF: 3.12 SPRAY, METERED RESPIRATORY (INHALATION) at 08:11

## 2023-11-18 RX ADMIN — POTASSIUM & SODIUM PHOSPHATES POWDER PACK 280-160-250 MG 2 PACKET: 280-160-250 PACK at 08:11

## 2023-11-18 RX ADMIN — REMDESIVIR 100 MG: 100 INJECTION, POWDER, LYOPHILIZED, FOR SOLUTION INTRAVENOUS at 08:11

## 2023-11-18 RX ADMIN — CARVEDILOL 6.25 MG: 3.12 TABLET, FILM COATED ORAL at 08:11

## 2023-11-18 RX ADMIN — FLUTICASONE FUROATE AND VILANTEROL TRIFENATATE 1 PUFF: 100; 25 POWDER RESPIRATORY (INHALATION) at 08:11

## 2023-11-18 RX ADMIN — ENOXAPARIN SODIUM 40 MG: 40 INJECTION SUBCUTANEOUS at 04:11

## 2023-11-18 RX ADMIN — POLYETHYLENE GLYCOL 3350 17 G: 17 POWDER, FOR SOLUTION ORAL at 12:11

## 2023-11-18 RX ADMIN — MONTELUKAST 10 MG: 10 TABLET, FILM COATED ORAL at 09:11

## 2023-11-18 RX ADMIN — GUAIFENESIN 400 MG: 200 SOLUTION ORAL at 09:11

## 2023-11-18 RX ADMIN — DEXAMETHASONE SODIUM PHOSPHATE 6 MG: 4 INJECTION INTRA-ARTICULAR; INTRALESIONAL; INTRAMUSCULAR; INTRAVENOUS; SOFT TISSUE at 08:11

## 2023-11-18 RX ADMIN — ASPIRIN 81 MG: 81 TABLET, COATED ORAL at 08:11

## 2023-11-18 NOTE — PLAN OF CARE
11/18/23 1312   Post-Acute Status   Post-Acute Authorization HME   HME Status Referrals Sent   Coverage [employer] [facility] [place] MEDICARE - [employer] MEDICARE HMO   Hospital Resources/Appts/Education Provided Provided education on problems/symptoms using teachback   Patient choice form signed by patient/caregiver List from CMS Compare   Discharge Delays None known at this time   Discharge Plan   Discharge Plan A Home Health   Discharge Plan B Home with family     CM Met with patient  to review discharge recommendation of home health  and is agreeable to plan    Patient/family provided list of facilities in-network with patient's payor plan. Providers that are owned, operated, or affiliated with [facility] are included on the list.     Notified that referral sent to below listed facilities from in-network list based on proximity to home/family support:   [facility] Phone: [phone]   2. [facility] [employer] [facility] Phone: [phone]   [facility] Phone: [phone]    4.[facility] Phone: [phone]    5. [facility] Phone: [phone]    6. [facility] Phone: [phone]  7. [facility] Phone: [phone]  Patient/family instructed to identify preference.    Preferred Facility: (if more than 1, listed in order of descending preference)  No verbalized preference     If an additional preferred facility not listed above is identified, additional referral to be sent. If above facilities unable to accept, will send additional referrals to in-network providers.      Erica Rubio RN  Case Management  [facility]  [phone]

## 2023-11-18 NOTE — PROGRESS NOTES
[facility] - Intensive Care ([address] Medicine  Progress Note    Patient Name: Jessica Floyd  MRN: [id number]  Patient Class: IP- Inpatient   Admission Date: 11/15/2023  Length of Stay: 3 days  Attending Physician: Daljit Trivedi MD  Primary Care Provider: Effie Olmedo MD        Subjective:     Principal Problem:COVID-19        HPI:  Jessica Floyd is a 73 year old white woman with former smoking, coronary artery disease, dyslipidemia, hypertension, gout, anxiety, history of subarachnoid hemorrhage in 1999 due to ruptured aneurysm status post clipping, pulmonary emphysema, mild pulmonary hypertension, allergic rhinitis, stress induced reflex syncope, history of ESBL urinary tract infection, history of anterior cruciate ligament repair in 2012, history of left hip arthroplasty on 5/28/2023, history of carotid artery stent, history of appendectomy. She lives in [facility]. She is . Her primary care physician is Dr. Effie Olmedo. Her cardiologist is Dr. Michael Lal.   She was hospitalized at [facility] from 10/28/2023 to 10/31/2023 for shortness of breath with hypoxia. Chest CTA showed pulmonary emphysema. Echocardiogram showed mild pulmonary hypertension. She was prescribed fluticasone-vilanterol, tiotropium, folic acid, multivitamin, quetiapine, and 1 liter/minute of supplemental oxygen as needed.   She was seen at [facility] clinic on 11/14/2023 for nasal congestion and cough for a week. She was found to have COVID-19. She received COVID-19 vaccinations on 1/9/2021, 1/30/2021, 8/23/2021, 4/20/2022, and 9/30/2023. She was prescribed fluticasone nasal spray.   She presented to [facility] Emergency Department on 11/15/2023 for acute shortness of breath while lying down, not improved with supplemental oxygen, and generalized weakness for the last few days. She continued to have chest and nasal congestion. Emergency medical  services found her to have oxygen saturation of 85%. She was given albuterol-ipratropium nebulizer and put on BiPAP.    In the emergency department, heart rate was 90 to 110. She had intermittent tachypnea. Oxygen saturation was 94% on room air. Labs showed WBC 91315/uL, from 9910 on 10/31/2023, sodium 130 mmol/L from 133, potassium 2.3 mmol/L,  pg/mL, D-dimer 1.71 mg/L. Chest X-ray showed increased lung markings similar to previous. She was given 40 mEq of potassium. She was admitted to Hospital Medicine Team D.       Overview/Hospital Course:  Chest CTA showed no pulmonary embolism. Lower extremity ultrasound showed no deep venous thrombosis. She was put on dexamethasone, remdesivir, and prophylactic dose enoxaparin.    Interval History: Had elevated glucose. Dealing with heartburn. Feels like she can go home tomorrow after last dose of remdesivir.    Review of Systems   Constitutional:  Negative for chills and fever.   Neurological:  Negative for seizures and syncope.     Objective:     Vital Signs (Most Recent):  Temp: 98.2 °F (36.8 °C) (11/18/23 1628)  Pulse: 81 (11/18/23 1628)  Resp: 17 (11/18/23 1628)  BP: 120/60 (11/18/23 1628)  SpO2: 97 % (11/18/23 1628) Vital Signs (24h Range):  Temp:  [98.1 °F (36.7 °C)-98.6 °F (37 °C)] 98.2 °F (36.8 °C)  Pulse:  [71-89] 81  Resp:  [17-20] 17  SpO2:  [94 %-97 %] 97 %  BP: (120-150)/(60-77) 120/60     Weight: 54.3 kg (119 lb 11.4 oz)  Body mass index is 20.55 kg/m².    Intake/Output Summary (Last 24 hours) at 11/18/2023 1727  Last data filed at 11/18/2023 1200  Gross per 24 hour   Intake --   Output 1700 ml   Net -1700 ml           Physical Exam  Vitals and nursing note reviewed.   Constitutional:       General: She is not in acute distress.     Appearance: She is well-developed and normal weight. She is not diaphoretic.      Interventions: Nasal cannula in place.   Pulmonary:      Effort: Pulmonary effort is normal. No respiratory distress.   Skin:     General:  Skin is warm and dry.      Coloration: Skin is not jaundiced or pale.   Neurological:      Mental Status: She is alert and oriented to person, place, and time. Mental status is at baseline.      Motor: No seizure activity.   Psychiatric:         Attention and Perception: Attention normal.         Mood and Affect: Mood and affect normal.         Behavior: Behavior is cooperative.             Significant Labs: All pertinent labs within the past 24 hours have been reviewed.    Significant Imaging: I have reviewed all pertinent imaging results/findings within the past 24 hours.    Assessment/Plan:      * COVID-19  Patient is identified as Severe COVID-19 based on hypoxemia with O2 saturations <94% on room air or on ambulation   Initiate standard COVID protocols; COVID-19 testing Collection Date: 5/30/2023 Collection Time:   8:46 AM ,Infection Control notification  and isolation- respiratory, contact and droplet per protocol    Diagnostics: CBC, CMP, Procalcitonin, BNP, Troponin, ECG, and Portable CXR    Management: Initiate targeted therapy with Remdesivir, 200mg IV x1, followed by 100mg IV daily x5 days total and Dexamethasone PO/IV 6mg daily x10 days, Maintain oxygen saturations 92-96% via Nasal Cannula  LPM and monitor with continuous/intermittent pulse oximetry. , Inhaled bronchodilators as needed for shortness of breath., and Continuous cardiac monitoring.    Advance Care Planning Current advance care plan has not been discussed with patient/family/POA. Continue status of Full Code for now     Pulmonary emphysema  Continue home tiotropium, fluticasone-vilanterol. Add albuterol inhaler prn and prescribe it.    Acute hypoxic respiratory failure  Patient with Hypoxic Respiratory failure which is Acute.  she is on home oxygen at recently started on 1L NC PRN at home LPM. Supplemental oxygen was provided and noted-   Found hypoxic to O2 sat of 85% at home on RA with EMS, Improved s/p bipap.     Signs/symptoms of  respiratory failure include- tachypnea. Contributing diagnoses includes - COPD and Covid-19  Labs and images were reviewed. Patient Has not had a recent ABG. Will treat underlying causes and adjust management of respiratory failure as follows-     -Leukocytosis of 23k on admission, procal negative   -Covid positive. D-dimer 1.71, likely elevated in setting of acute covid infection. Age adjust d-dimer makes VTA unlikely. Wells' score for PE is 1.5. .   -CTA chest without large PE but or right heart strain but small thromboemboli are not excluded. Patchy ground-glass attenuation with atelectatic versus consolidative.  -Check lower extremity US given inconclusive CTA   -Recent TTE (10/29/23) reviewed.   -Steroids/Remdesivir for Covid  -PRN duo nebs, mucinex, tessalon perles   -Continue home singular and inhalers         Severe anxiety  Continue home quetiapine. Giving prn dose.    Gout  Continue home allopurinol.    Hypertension  Continue home amlodipine, carvedilol. Holding home hydrochlorothiazide. Monitor blood pressures.    Gastroesophageal reflux disease without esophagitis  Continue home pantoprazole. Mylanta and Tums prn.    Dyslipidemia  Continue home atorvastatin.    Coronary artery disease involving native coronary artery of native heart without angina pectoris  Continue home aspirin and atorvastatin.      VTE Risk Mitigation (From admission, onward)           Ordered     enoxaparin injection 40 mg  Daily         11/15/23 2257     IP VTE HIGH RISK PATIENT  Once         11/16/23 0049     Place sequential compression device  Until discontinued         11/16/23 0049     Place sequential compression device  Until discontinued         11/15/23 2257                    Discharge Planning   DEBBIE: 11/19/2023     Code Status: Full Code   Is the patient medically ready for discharge?: No    Reason for patient still in hospital (select all that apply): Treatment  Discharge Plan A: Home Health   Discharge Delays:  None known at this time              Daljit Trivedi MD  Department of Hospital Medicine   [facility] - Intensive Care ([place]-[facility])

## 2023-11-18 NOTE — NURSING
Pt AO anxious requiring seroquel BID. Remains on 2L NC;O2 humidification added as pt reports epistaxis. Seen up to chair chatting with  and enjoying a meal. +congested cough dim breath sounds given guaifenesin and tessalon; continues on remdesivir however med now scheduled for 0900. Completed doses 2/5. Accucheck Ac/hs BG 174mg/dl not DM no coverage; Regular diet. No BM overnight; already started on bowel regimen yesterday. Standing activities with 1 person and walker. Restful sleep overnight.

## 2023-11-18 NOTE — PROGRESS NOTES
Shift Summary    Patient alert and oriented x4. Complains of headache and generalized aches this AM- PRN meds given. NSR on monitor. On 2L and sating well- no signs of respiratory distress. Up to chair with PT/OT. Incontinence care provided, new purewick placed. Safety precautions in place. Call light in reach.

## 2023-11-18 NOTE — SUBJECTIVE & OBJECTIVE
Interval History: Had elevated glucose. Dealing with heartburn. Feels like she can go home tomorrow after last dose of remdesivir.    Review of Systems   Constitutional:  Negative for chills and fever.   Neurological:  Negative for seizures and syncope.     Objective:     Vital Signs (Most Recent):  Temp: 98.2 °F (36.8 °C) (11/18/23 1628)  Pulse: 81 (11/18/23 1628)  Resp: 17 (11/18/23 1628)  BP: 120/60 (11/18/23 1628)  SpO2: 97 % (11/18/23 1628) Vital Signs (24h Range):  Temp:  [98.1 °F (36.7 °C)-98.6 °F (37 °C)] 98.2 °F (36.8 °C)  Pulse:  [71-89] 81  Resp:  [17-20] 17  SpO2:  [94 %-97 %] 97 %  BP: (120-150)/(60-77) 120/60     Weight: 54.3 kg (119 lb 11.4 oz)  Body mass index is 20.55 kg/m².    Intake/Output Summary (Last 24 hours) at 11/18/2023 1727  Last data filed at 11/18/2023 1200  Gross per 24 hour   Intake --   Output 1700 ml   Net -1700 ml           Physical Exam  Vitals and nursing note reviewed.   Constitutional:       General: She is not in acute distress.     Appearance: She is well-developed and normal weight. She is not diaphoretic.      Interventions: Nasal cannula in place.   Pulmonary:      Effort: Pulmonary effort is normal. No respiratory distress.   Skin:     General: Skin is warm and dry.      Coloration: Skin is not jaundiced or pale.   Neurological:      Mental Status: She is alert and oriented to person, place, and time. Mental status is at baseline.      Motor: No seizure activity.   Psychiatric:         Attention and Perception: Attention normal.         Mood and Affect: Mood and affect normal.         Behavior: Behavior is cooperative.             Significant Labs: All pertinent labs within the past 24 hours have been reviewed.    Significant Imaging: I have reviewed all pertinent imaging results/findings within the past 24 hours.

## 2023-11-18 NOTE — PLAN OF CARE
Problem: Adult Inpatient Plan of Care  Goal: Plan of Care Review  Outcome: Ongoing, Progressing     Problem: Infection  Goal: Absence of Infection Signs and Symptoms  Outcome: Ongoing, Progressing     Problem: Skin Injury Risk Increased  Goal: Skin Health and Integrity  Outcome: Ongoing, Progressing     Problem: Gas Exchange Impaired  Goal: Optimal Gas Exchange  Outcome: Ongoing, Progressing

## 2023-11-18 NOTE — PLAN OF CARE
[facility])  Initial Discharge Assessment    SW spoke w/pt's , Sandie, via phone to complete Discharge Planning Assessment. Per , pt lives with him in a H with 6 EMILY and hand rails on both sides. Per , pt was independent w/ADLs and used a rolling walker and quad cane for ambulation. Pt will have assistance from her  upon discharge. All questions addressed. SW will follow for needs.    Primary Care Provider: Effie Olmedo MD    Admission Diagnosis: Shortness of breath [R06.02]  Chest pain [R07.9]  Acute hypoxemic respiratory failure [J96.01]  COVID-19 [U07.1]    Admission Date: 11/15/2023  Expected Discharge Date: 11/18/2023    Transition of Care Barriers: (P) None    Payor: [employer] MANAGED MEDICARE / Plan: HU[employer] MEDICARE HMO / Product Type: Capitation /     Extended Emergency Contact Information  Primary Emergency Contact: Sandie Floyd  Address: [address] [place] [address] [id number] [place] [address]  Home Phone: [phone]  Work Phone: [phone]  Mobile Phone: [phone]  Relation: Spouse   needed? No  Secondary Emergency Contact: Ramila Floyd  Address: [address] [place] [address] [place]  Mobile Phone: [phone]  Relation: Daughter    Discharge Plan A: (P) Home with family, Home Health  Discharge Plan B: (P) Home with family, Home Health      [id number] - [place], [place] - [address]  [address] [facility] [address]  Phone: [phone] Fax: [phone]    [facility] - [place], [place] - [address]  [address] [address]  Phone: [phone] Fax: [phone]      Initial Assessment (most recent)       Adult Discharge Assessment - 11/17/23 9807          Discharge Assessment    Assessment Type Discharge Planning Assessment     Confirmed/corrected address, phone number and insurance Yes     Confirmed  Demographics Correct on Facesheet     Source of Information health record;family     If unable to respond/provide information was family/caregiver contacted? Yes     Contact Name/Number Sandie Floyd (Spouse) [phone]     Communicated DEBBIE with patient/caregiver Date not available/Unable to determine     Reason For Admission COVID-19     People in Home spouse     Do you expect to return to your current living situation? Yes     Do you have help at home or someone to help you manage your care at home? Yes     Who are your caregiver(s) and their phone number(s)? Sandie Floyd (Spouse) [phone]     Prior to hospitilization cognitive status: Unable to Assess     Current cognitive status: Unable to Assess     Walking or Climbing Stairs ambulation difficulty, requires equipment     Dressing/Bathing --   Independent    Do you have any problems with: --   Independent    Home Layout Able to live on 1st floor     Equipment Currently Used at Home bedside commode;bath bench;cane, quad;grab bar;raised toilet;walker, rolling     Readmission within 30 days? Yes     Patient currently being followed by outpatient case management? Unable to determine (comments)     Do you currently have service(s) that help you manage your care at home? No (P)      Do you take prescription medications? Yes (P)      Do you have prescription coverage? Yes (P)      Coverage [employer] MANAGED MEDICARE - [employer] MEDICARE HMO (P)      Do you have any problems affording any of your prescribed medications? TBD (P)      Who is going to help you get home at discharge? Sandie Floyd (Spouse) [phone] (P)      How do you get to doctors appointments? car, drives self;family or friend will provide (P)      Are you on dialysis? No (P)      Do you take coumadin? No (P)      DME Needed Upon Discharge  other (see comments) (P)    TBD    Discharge Plan discussed with: Spouse/sig other (P)      Name(s) and Number(s) Sandie Floyd (Spouse) [phone] (P)       Transition of Care Barriers None (P)      Discharge Plan A Home with family;Home Health (P)      Discharge Plan B Home with family;Home Health (P)         OTHER    Name(s) of People in Home Sandie Floyd (Spouse) [phone] (P)                      Readmission Assessment (most recent)       Readmission Assessment - 11/17/23 1758          Readmission    Why were you hospitalized in the last 30 days? Pulmonary HTN     Why were you readmitted? New medical problem     Did patient/caregiver refused recommended DC plan? No     Was this a planned readmission? No                    Discharge Plan A and Plan B have been determined by review of patient's clinical status, future medical and therapeutic needs, and coverage/benefits for post-acute care in coordination with multidisciplinary team members.    BARRINGTON Wilkins, CSW    Case Management Department  [facility]

## 2023-11-18 NOTE — PLAN OF CARE
Problem: Adult Inpatient Plan of Care  Goal: Plan of Care Review  Outcome: Ongoing, Progressing  Goal: Patient-Specific Goal (Individualized)  Outcome: Ongoing, Progressing  Goal: Absence of Hospital-Acquired Illness or Injury  Outcome: Ongoing, Progressing  Goal: Optimal Comfort and Wellbeing  Outcome: Ongoing, Progressing  Goal: Readiness for Transition of Care  Outcome: Ongoing, Progressing     Problem: Infection  Goal: Absence of Infection Signs and Symptoms  Outcome: Ongoing, Progressing     Problem: Impaired Wound Healing  Goal: Optimal Wound Healing  Outcome: Ongoing, Progressing     Problem: Skin Injury Risk Increased  Goal: Skin Health and Integrity  Outcome: Ongoing, Progressing     Problem: Gas Exchange Impaired  Goal: Optimal Gas Exchange  Outcome: Ongoing, Progressing

## 2023-11-19 VITALS
DIASTOLIC BLOOD PRESSURE: 82 MMHG | WEIGHT: 119.69 LBS | SYSTOLIC BLOOD PRESSURE: 136 MMHG | TEMPERATURE: 98 F | HEART RATE: 86 BPM | RESPIRATION RATE: 19 BRPM | BODY MASS INDEX: 20.43 KG/M2 | OXYGEN SATURATION: 98 % | HEIGHT: 64 IN

## 2023-11-19 LAB
POCT GLUCOSE: 104 MG/DL (ref 70–110)
POCT GLUCOSE: 88 MG/DL (ref 70–110)

## 2023-11-19 PROCEDURE — 63600175 PHARM REV CODE 636 W HCPCS: Mod: HCNC | Performed by: HOSPITALIST

## 2023-11-19 PROCEDURE — 63600175 PHARM REV CODE 636 W HCPCS: Mod: JZ,TB,HCNC

## 2023-11-19 PROCEDURE — 25000003 PHARM REV CODE 250: Mod: HCNC

## 2023-11-19 PROCEDURE — 25000003 PHARM REV CODE 250: Mod: HCNC | Performed by: INTERNAL MEDICINE

## 2023-11-19 PROCEDURE — 25000003 PHARM REV CODE 250: Mod: HCNC | Performed by: HOSPITALIST

## 2023-11-19 RX ORDER — DEXAMETHASONE 6 MG/1
6 TABLET ORAL DAILY
Qty: 5 TABLET | Refills: 0 | Status: SHIPPED | OUTPATIENT
Start: 2023-11-20 | End: 2023-11-25

## 2023-11-19 RX ORDER — QUETIAPINE FUMARATE 50 MG/1
50 TABLET, FILM COATED ORAL NIGHTLY
Qty: 30 TABLET | Refills: 0 | Status: SHIPPED | OUTPATIENT
Start: 2023-11-19 | End: 2024-01-08 | Stop reason: SDUPTHER

## 2023-11-19 RX ADMIN — DEXAMETHASONE 6 MG: 4 TABLET ORAL at 08:11

## 2023-11-19 RX ADMIN — REMDESIVIR 100 MG: 100 INJECTION, POWDER, LYOPHILIZED, FOR SOLUTION INTRAVENOUS at 08:11

## 2023-11-19 RX ADMIN — ATORVASTATIN CALCIUM 80 MG: 40 TABLET, FILM COATED ORAL at 08:11

## 2023-11-19 RX ADMIN — SIMETHICONE 80 MG: 80 TABLET, CHEWABLE ORAL at 08:11

## 2023-11-19 RX ADMIN — GUAIFENESIN 400 MG: 200 SOLUTION ORAL at 03:11

## 2023-11-19 RX ADMIN — ANTACID TABLETS 1000 MG: 500 TABLET, CHEWABLE ORAL at 08:11

## 2023-11-19 RX ADMIN — ALLOPURINOL 200 MG: 100 TABLET ORAL at 08:11

## 2023-11-19 RX ADMIN — PANTOPRAZOLE SODIUM 40 MG: 40 TABLET, DELAYED RELEASE ORAL at 08:11

## 2023-11-19 RX ADMIN — BENZONATATE 200 MG: 100 CAPSULE ORAL at 08:11

## 2023-11-19 RX ADMIN — NASAL 1 SPRAY: 6.5 SPRAY NASAL at 08:11

## 2023-11-19 RX ADMIN — ASPIRIN 81 MG: 81 TABLET, COATED ORAL at 08:11

## 2023-11-19 RX ADMIN — TIOTROPIUM BROMIDE INHALATION SPRAY 2 PUFF: 3.12 SPRAY, METERED RESPIRATORY (INHALATION) at 08:11

## 2023-11-19 RX ADMIN — THERA TABS 1 TABLET: TAB at 08:11

## 2023-11-19 RX ADMIN — AMLODIPINE BESYLATE 5 MG: 5 TABLET ORAL at 08:11

## 2023-11-19 RX ADMIN — BENZONATATE 200 MG: 100 CAPSULE ORAL at 02:11

## 2023-11-19 RX ADMIN — FOLIC ACID 1 MG: 1 TABLET ORAL at 08:11

## 2023-11-19 RX ADMIN — CARVEDILOL 6.25 MG: 3.12 TABLET, FILM COATED ORAL at 08:11

## 2023-11-19 RX ADMIN — FLUTICASONE PROPIONATE 100 MCG: 50 SPRAY, METERED NASAL at 08:11

## 2023-11-19 RX ADMIN — FLUTICASONE FUROATE AND VILANTEROL TRIFENATATE 1 PUFF: 100; 25 POWDER RESPIRATORY (INHALATION) at 08:11

## 2023-11-19 RX ADMIN — GUAIFENESIN 400 MG: 200 SOLUTION ORAL at 02:11

## 2023-11-19 NOTE — PROGRESS NOTES
Shift Summary/ DC Summary    Patient alert and oriented x4. Denies pain. On RA - 2L NC PRN. NSR on monitor. Up to chair this AM with Ax1. Patient assisted with showering. Incotinence care provided. DC orders received this afternoon. DC education provided. Prescriptions delivered to bedside.  agency information provided to . Escorted via wheelchair to private vehicle for DC. All personal belongings packed and sent home with patient.

## 2023-11-19 NOTE — PROGRESS NOTES
Shift Summary    Patient alert and oriented x4. Complains of shoulder pain and headache this AM- PRN tylenol given. NSR on monitor. On RA to 2L NC, intermittent episodes of desaturations. Up to chair this AM, then  assisted back to bed around lunchtime. Complains of bad heart burn- multiple PRNs given with moderate relief after multiple attempts.  at bedside. BG elevated- PRN insulin ordered and given. Incontinence care provided x2 during day, purewick changed and pericare provided. Safety precautions in place. Call light in reach.

## 2023-11-19 NOTE — PLAN OF CARE
Pt slept well this shift. A&Ox 4. VSS. Denies pain. PRN cough and acid reflux medications given with effect. T&RQ2. Skin care completed. Safety precautions in place. Call light in reach. No further concerns noted at this time.    Problem: Adult Inpatient Plan of Care  Goal: Plan of Care Review  Outcome: Ongoing, Progressing  Goal: Patient-Specific Goal (Individualized)  Outcome: Ongoing, Progressing  Goal: Absence of Hospital-Acquired Illness or Injury  Outcome: Ongoing, Progressing  Goal: Optimal Comfort and Wellbeing  Outcome: Ongoing, Progressing  Goal: Readiness for Transition of Care  Outcome: Ongoing, Progressing     Problem: Infection  Goal: Absence of Infection Signs and Symptoms  Outcome: Ongoing, Progressing     Problem: Impaired Wound Healing  Goal: Optimal Wound Healing  Outcome: Ongoing, Progressing     Problem: Skin Injury Risk Increased  Goal: Skin Health and Integrity  Outcome: Ongoing, Progressing     Problem: Gas Exchange Impaired  Goal: Optimal Gas Exchange  Outcome: Ongoing, Progressing

## 2023-11-19 NOTE — DISCHARGE SUMMARY
[facility] - Intensive Care ([address] Medicine  Discharge Summary      Patient Name: Jessica Floyd  MRN: [id number]  [facility]: [id number]  Patient Class: IP- Inpatient  Admission Date: 11/15/2023  Hospital Length of Stay: 4 days  Discharge Date and Time: 11/19/2023  4:10 PM  Attending Physician: Daljit Trivedi MD   Discharging Provider: Daljit Trivedi MD  Primary Care Provider: Effie Olmedo MD  [facility] Medicine Team: [facility] HOSP MED D Daljit Trivedi MD  Primary Care Team: [facility] MED D    HPI:   Jessica Floyd is a 73 year old white woman with former smoking, coronary artery disease, dyslipidemia, hypertension, gout, anxiety, history of subarachnoid hemorrhage in 1999 due to ruptured aneurysm status post clipping, pulmonary emphysema, mild pulmonary hypertension, allergic rhinitis, stress induced reflex syncope, history of ESBL urinary tract infection, history of anterior cruciate ligament repair in 2012, history of left hip arthroplasty on 5/28/2023, history of carotid artery stent, history of appendectomy. She lives in [facility]. She is . Her primary care physician is Dr. Effie Olmedo. Her cardiologist is Dr. Michael Lal.   She was hospitalized at [facility] from 10/28/2023 to 10/31/2023 for shortness of breath with hypoxia. Chest CTA showed pulmonary emphysema. Echocardiogram showed mild pulmonary hypertension. She was prescribed fluticasone-vilanterol, tiotropium, folic acid, multivitamin, quetiapine, and 1 liter/minute of supplemental oxygen as needed.   She was seen at [facility] on 11/14/2023 for nasal congestion and cough for a week. She was found to have COVID-19. She received COVID-19 vaccinations on 1/9/2021, 1/30/2021, 8/23/2021, 4/20/2022, and 9/30/2023. She was prescribed fluticasone nasal spray.   She presented to [facility] Emergency Department on 11/15/2023 for acute shortness of breath while  lying down, not improved with supplemental oxygen, and generalized weakness for the last few days. She continued to have chest and nasal congestion. Emergency medical services found her to have oxygen saturation of 85%. She was given albuterol-ipratropium nebulizer and put on BiPAP.    In the emergency department, heart rate was 90 to 110. She had intermittent tachypnea. Oxygen saturation was 94% on room air. Labs showed WBC 37048/uL, from 9910 on 10/31/2023, sodium 130 mmol/L from 133, potassium 2.3 mmol/L,  pg/mL, D-dimer 1.71 mg/L. Chest X-ray showed increased lung markings similar to previous. She was given 40 mEq of potassium. She was admitted to Hospital Medicine Team D.         Hospital Course:   Chest CTA showed no pulmonary embolism. Lower extremity ultrasound showed no deep venous thrombosis. She was put on dexamethasone, remdesivir, and prophylactic dose enoxaparin. Symptoms improved. She finished the course of remdesivir. She was prescribed the rest of the course of dexamethasone.      Goals of Care Treatment Preferences:  Code Status: Full Code      Consults: PT, OT    Final Active Diagnoses:    Diagnosis Date Noted POA    PRINCIPAL PROBLEM:  COVID-19 [U07.1] 11/15/2023 Yes    Pulmonary emphysema [J43.9] 11/16/2023 Yes     Chronic    Acute hypoxic respiratory failure [J96.01] 10/30/2023 Yes    Severe anxiety [F41.9] 10/28/2023 Yes     Chronic    Gout [M10.9] 10/16/2023 Yes     Chronic    Hypertension [I10]  Yes     Chronic    Gastroesophageal reflux disease without esophagitis [K21.9] 03/20/2017 Yes     Chronic    Coronary artery disease involving native coronary artery of native heart without angina pectoris [I25.10] 08/24/2016 Yes     Chronic    Dyslipidemia [E78.5] 08/24/2016 Yes     Chronic      Problems Resolved During this Admission:    Diagnosis Date Noted Date Resolved POA    Hyponatremia [E87.1] 06/02/2023 11/17/2023 Yes    Hypokalemia [E87.6] 05/26/2017 11/17/2023 Yes       Discharged  Condition: good    Disposition: Home or Self Care    Follow Up:   Follow-up Information       Effie Olmedo MD Follow up.    Specialty: Family Medicine  Why: As needed  Contact information:  [address] [id number] [phone]                           Patient Instructions:      Ambulatory referral/consult to [facility]   Standing Status: Future   Referral Priority: Routine Referral Type: Consultation   Referral Reason: Specialty Services Required   Number of Visits Requested: 1     Diet Adult Regular     Notify your health care provider if you experience any of the following:  difficulty breathing or increased cough     Activity as tolerated       Significant Diagnostic Studies:        [facility] - Intensive Care ([address] Medicine  Progress Note     Patient Name: Jessica Floyd  MRN: [id number]  Patient Class: IP- Inpatient     Admission Date: 11/15/2023  Length of Stay: 1 days  Attending Physician: Daljit Trivedi MD  Primary Care Provider: Effie Olmedo MD           Subjective:      Principal Problem:COVID-19           HPI:  Jessica Floyd is a 73 year old white woman with former smoking, coronary artery disease, dyslipidemia, hypertension, gout, anxiety, history of subarachnoid hemorrhage in 1999 due to ruptured aneurysm status post clipping, pulmonary emphysema, mild pulmonary hypertension, allergic rhinitis, stress induced reflex syncope, history of ESBL urinary tract infection, history of anterior cruciate ligament repair in 2012, history of left hip arthroplasty on 5/28/2023, history of carotid artery stent, history of appendectomy. She lives in [facility]. She is . Her primary care physician is Dr. Effie Olmedo. Her cardiologist is Dr. Michael Lal.              She was hospitalized at [facility] from 10/28/2023 to 10/31/2023 for shortness of breath with hypoxia. Chest CTA showed pulmonary  emphysema. Echocardiogram showed mild pulmonary hypertension. She was prescribed fluticasone-vilanterol, tiotropium, folic acid, multivitamin, quetiapine, and 1 liter/minute of supplemental oxygen as needed.              She was seen at [facility] on 11/14/2023 for nasal congestion and cough for a week. She was found to have COVID-19. She received COVID-19 vaccinations on 1/9/2021, 1/30/2021, 8/23/2021, 4/20/2022, and 9/30/2023. She was prescribed fluticasone nasal spray.              She presented to [facility] Emergency Department on 11/15/2023 for acute shortness of breath while lying down, not improved with supplemental oxygen, and generalized weakness for the last few days. She continued to have chest and nasal congestion. Emergency medical services found her to have oxygen saturation of 85%. She was given albuterol-ipratropium nebulizer and put on BiPAP.               In the emergency department, heart rate was 90 to 110. She had intermittent tachypnea. Oxygen saturation was 94% on room air. Labs showed WBC 92508/uL, from 9910 on 10/31/2023, sodium 130 mmol/L from 133, potassium 2.3 mmol/L,  pg/mL, D-dimer 1.71 mg/L. Chest X-ray showed increased lung markings similar to previous. She was given 40 mEq of potassium. She was admitted to Hospital Medicine Team D.         Overview/Hospital Course:  Chest CTA showed no pulmonary embolism. Lower extremity ultrasound showed no deep venous thrombosis. She was put on dexamethasone, remdesivir, and prophylactic dose enoxaparin.     Interval History: Patient eating dinner.  at bedside. Feeling better than yesterday.     Review of Systems   Respiratory:  Positive for cough and shortness of breath.    Neurological:  Negative for seizures and syncope.      Objective:      Vital Signs (Most Recent):  Temp: 98.2 °F (36.8 °C) (11/16/23 1500)  Pulse: 80 (11/16/23 1519)  Resp: 20 (11/16/23 1500)  BP: 128/62  (11/16/23 1600)  SpO2: 95 % (11/16/23 1519) Vital Signs (24h Range):  Temp:  [97.4 °F (36.3 °C)-99 °F (37.2 °C)] 98.2 °F (36.8 °C)  Pulse:  [] 80  Resp:  [13-29] 20  SpO2:  [94 %-99 %] 95 %  BP: (110-149)/(59-82) 128/62      Weight: 54.3 kg (119 lb 11.4 oz)  Body mass index is 20.55 kg/m².     Intake/Output Summary (Last 24 hours) at 11/16/2023 1657  Last data filed at 11/16/2023 1345      Gross per 24 hour   Intake 124.92 ml   Output 1350 ml   Net -1225.08 ml         Physical Exam  Vitals and nursing note reviewed.   Constitutional:       General: She is not in acute distress.     Appearance: She is well-developed and normal weight. She is not diaphoretic.      Interventions: Nasal cannula in place.   Pulmonary:      Effort: Pulmonary effort is normal. No respiratory distress.   Skin:     General: Skin is warm and dry.      Coloration: Skin is not jaundiced or pale.   Neurological:      Mental Status: She is alert and oriented to person, place, and time. Mental status is at baseline.      Motor: No seizure activity.   Psychiatric:         Attention and Perception: Attention normal.         Mood and Affect: Mood and affect normal.         Behavior: Behavior is cooperative.                Significant Labs: All pertinent labs within the past 24 hours have been reviewed.     Significant Imaging: I have reviewed all pertinent imaging results/findings within the past 24 hours.  X-Ray Chest 1 View 11/15/23: FINDINGS:   Increased markings in the lungs, similar to prior.   No consolidation, pleural effusion or pneumothorax.   Cardiomediastinal silhouette is unremarkable.   CTA Chest Non-Coronary (PE Studies) 11/16/23: FINDINGS:   Base of Neck: Right carotid artery stent.   Thoracic soft tissues: Normal.   Aorta/vasculature: Left-sided aortic arch.  No aneurysm.  Calcific atherosclerosis including branch vessels.   Heart: Left atrial prominence.  No effusion.  Coronary artery calcific atherosclerosis.  Aortic valve  annular calcifications.   Pulmonary vasculature: Evaluation limited due to extensive respiratory motion and streak artifact from the right upper extremity in the field of view.  No large saddle embolus or pulmonary infarct.  Questionable filling defect in a right lower lobe segmental branch (coronal 601:158), favored artifactual.  Small thromboemboli are not excluded on the basis of this examination.  No evidence for right heart strain.  Main pulmonary trunk measures at the upper limit of normal.   Aaliyah/Mediastinum: No pathologic pooja enlargement.   Airways: Patent.   Lungs/Pleura: Severely limited evaluation due to respiratory motion.  Suspected ground-glass attenuation with atelectatic versus consolidative change involving the lingula, right middle lobe, and right lung base.   Esophagus: Normal.   Upper Abdomen: Stable left hepatic lobe hypodensity.   Bones: No acute displaced fracture.  No suspicious lytic or sclerotic lesions.  Superior subluxation of the humeral head with respect to the glenoid, which appears to abut the inferior chromium, compatible with rotator cuff pathology.  Mild height loss involving the lower thoracic/upper lumbar vertebral bodies.   Impression:  1. Limited examination as above.  No large saddle embolus or pulmonary infarct.  No evidence for right heart strain. Questionable filling defect in a right lower lobe segmental branch (coronal 601:158), favored artifactual. Small thromboemboli are not excluded on the basis of this examination.   2. Patchy ground-glass attenuation with atelectatic versus consolidative change, noting significant motion artifact.  Nonspecific appearance, may reflect multifocal infectious/inflammatory process.   3. Additional findings as above.   US Lower Extremity Veins Bilateral 11/16/23: FINDINGS:   Right thigh veins: The common femoral, femoral, popliteal, upper greater saphenous, and deep femoral veins are patent and free of thrombus. The veins are normally  compressible and have normal phasic flow and augmentation response.   Right calf veins: The visualized calf veins are patent.   Left thigh veins: The common femoral, femoral, popliteal, upper greater saphenous, and deep femoral veins are patent and free of thrombus. The veins are normally compressible and have normal phasic flow and augmentation response.   Left calf veins: The visualized calf veins are patent.   Miscellaneous: None   Impression:  No evidence of deep venous thrombosis in either lower extremity.            Medications:  Reconciled Home Medications:      Medication List        START taking these medications      dexAMETHasone 6 MG tablet  Commonly known as: DECADRON  Take 1 tablet (6 mg total) by mouth once daily. for 5 days  Start taking on: November 20, 2023            CONTINUE taking these medications      acetaminophen 650 MG Tbsr  Commonly known as: TYLENOL  Take 1 tablet (650 mg total) by mouth every 6 to 8 hours as needed (pain (mild-moderate)).     allopurinoL 100 MG tablet  Commonly known as: ZYLOPRIM  Take 2 tablets (200 mg total) by mouth once daily.     amLODIPine 5 MG tablet  Commonly known as: NORVASC  TAKE 1 TABLET BY MOUTH EVERY DAY     aspirin 81 MG EC tablet  Commonly known as: ECOTRIN  Take 81 mg by mouth once daily.     atorvastatin 80 MG tablet  Commonly known as: LIPITOR  TAKE 1 TABLET BY MOUTH EVERY DAY     benzonatate 200 MG capsule  Commonly known as: TESSALON  Take 1 capsule (200 mg total) by mouth 3 (three) times daily as needed for Cough.     BREO ELLIPTA 100-25 mcg/dose diskus inhaler  Generic drug: fluticasone furoate-vilanteroL  Inhale 1 puff into the lungs once daily. Controller     carvediloL 6.25 MG tablet  Commonly known as: COREG  Take 1 tablet (6.25 mg total) by mouth 2 (two) times daily with meals.     celecoxib 200 MG capsule  Commonly known as: CeleBREX  Take 1 capsule (200 mg total) by mouth as needed.     colchicine 0.6 mg tablet  Commonly known as:  COLCRYS  Take 1 tablet (0.6 mg total) by mouth once daily. As needed for gout     fluticasone propionate 50 mcg/actuation nasal spray  Commonly known as: FLONASE  SPRAY 2 pumps INTO EACH NOSTRIL ONCE DAILY     folic acid 1 MG tablet  Commonly known as: FOLVITE  Take 1 tablet (1 mg total) by mouth once daily.     hydroCHLOROthiazide 25 MG tablet  Commonly known as: HYDRODIURIL  Take 1 tablet (25 mg total) by mouth once daily.     melatonin 3 mg tablet  Commonly known as: MELATIN  Take 2 tablets (6 mg total) by mouth nightly as needed for Insomnia.     montelukast 10 mg tablet  Commonly known as: SINGULAIR  Take 1 tablet (10 mg total) by mouth every evening.     pantoprazole 40 MG tablet  Commonly known as: PROTONIX  Take 1 tablet (40 mg total) by mouth once daily.     PRENATAL MULTI ORAL  Take 1 tablet by mouth once daily.     QUEtiapine 50 MG tablet  Commonly known as: SEROQUEL  Take 1 tablet (50 mg total) by mouth every evening.     SPIRIVA RESPIMAT 2.5 mcg/actuation inhaler  Generic drug: tiotropium bromide  Inhale 2 puffs into the lungs once daily. Controller     THERA-TABS tablet  Generic drug: multivitamin  Take 1 tablet by mouth once daily.              Indwelling Lines/Drains at time of discharge:   none       Time spent on the discharge of patient: 35 minutes         Daljit Trivedi MD  Department of Hospital Medicine  [facility] - Intensive Care ([place]-[facility])

## 2023-11-20 NOTE — PLAN OF CARE
[facility]  Discharge Final Note    Primary Care Provider: Effie Olmedo MD    Expected Discharge Date: 11/19/2023    Final Discharge Note (most recent)       Final Note - 11/18/23 1312          Final Note    Hospital Resources/Appts/Education Provided Provided education on problems/symptoms using teachback        Post-Acute Status    Post-Acute Authorization HME     HME Status Referrals Sent     Coverage [employer] MA[employer]ED MEDICARE - [employer] H[employer]     Patient choice form signed by patient/caregiver List from CMS Compare     Discharge Delays None known at this time                                  Contact Info       Effie Olmedo MD   Specialty: Family Medicine   Relationship: PCP - General    [address] [place], [place] [id number]   Phone: [phone]       Next Steps: Follow up    Instructions: As needed          Future Appointments   Date Time Provider Department Center   11/29/2023  9:45 AM Carlos Garcia MD [facility] SPORTS [place]   12/1/2023  9:30 AM Mckay Cosby NP [facility] ORTHO [address]   12/15/2023  7:00 AM LAB, SAME DAY [facility] BAPH LABDRAW [facility]   12/18/2023 11:20 AM Anh Martinez MD [facility] HEM ONC [facility]   12/18/2023  1:00 PM Kvng Abrams MD [facility] PULMSVC [address]   12/27/2023 10:30 AM Juan Naranjo MD [facility] RMTLGY [place]   12/29/2023  2:00 PM Carmela Ace NP [facility] PSYCH [address]   3/5/2024  9:00 AM Michael Lal MD [facility] [id number] [facility]   6/17/2024  9:00 AM [facility] DEXA1 [facility] BMD [facility]       CM sent a home health denials to Allyn BONIFACIO with [employer] to assist in securing HH agency      The Medical Team - [place] Phone: [phone]  Response: Yes, willing to accept patient  Comments: pt will be resumed this week, thank    Erica Rubio RN  Case Management  [facility]  [phone]

## 2023-11-20 NOTE — PLAN OF CARE
[facility] ([place] [address])      HOME HEALTH ORDERS  FACE TO FACE ENCOUNTER    Patient Name: Jessica Floyd  YOB: 1950    PCP: Effie Olmedo MD   PCP Address: [address] [place] [address]  PCP Phone Number: [phone]  PCP Fax: [phone]    Encounter Date: 11/15/23    Admit to Home Health    Diagnoses:  Active Hospital Problems    Diagnosis  POA    *COVID-19 [U07.1]  Yes    Pulmonary emphysema [J43.9]  Yes     Chronic    Acute hypoxic respiratory failure [J96.01]  Yes    Severe anxiety [F41.9]  Yes     Chronic    Gout [M10.9]  Yes     Chronic    Hypertension [I10]  Yes     Chronic    Gastroesophageal reflux disease without esophagitis [K21.9]  Yes     Chronic    Coronary artery disease involving native coronary artery of native heart without angina pectoris [I25.10]  Yes     Chronic     Outside [facility] 2014:  mLAD 20%  mCx 50%  mRCA 20%      Dyslipidemia [E78.5]  Yes     Chronic      Resolved Hospital Problems    Diagnosis Date Resolved POA    Hyponatremia [E87.1] 11/17/2023 Yes    Hypokalemia [E87.6] 11/17/2023 Yes       Follow Up Appointments:  Future Appointments   Date Time Provider Department Center   11/29/2023  9:45 AM Carlos Garcia MD [facility] SPORTS [place]   12/1/2023  9:30 AM Mckay Cosby NP [facility] ORTHO [facility]   12/15/2023  7:00 AM LAB, SAME DAY [facility] LABDRAW [facility]   12/18/2023 11:20 AM Anh Martinez MD [facility] HEM ONC [facility]   12/18/2023  1:00 PM Kvng Abrams MD [facility] PULMSVC [facility]   12/27/2023 10:30 AM Juan Naranjo MD [facility]   3/5/2024  9:00 AM Michael Lal MD [facility] [id number] [facility]   6/17/2024  9:00 AM [facility] DEXA1 [facility] BMD [facility]       Allergies:Review of patient's allergies indicates:  No Known Allergies    Medications: Review discharge medications with patient and family and provide education.    No current facility-administered medications for this encounter.     Current  Outpatient Medications   Medication Sig Dispense Refill    allopurinoL (ZYLOPRIM) 100 MG tablet Take 2 tablets (200 mg total) by mouth once daily. 180 tablet 3    amLODIPine (NORVASC) 5 MG tablet TAKE 1 TABLET BY MOUTH EVERY DAY 90 tablet 1    atorvastatin (LIPITOR) 80 MG tablet TAKE 1 TABLET BY MOUTH EVERY DAY 90 tablet 3    carvediloL (COREG) 6.25 MG tablet Take 1 tablet (6.25 mg total) by mouth 2 (two) times daily with meals. 60 tablet 11    hydroCHLOROthiazide (HYDRODIURIL) 25 MG tablet Take 1 tablet (25 mg total) by mouth once daily. 90 tablet 3    pantoprazole (PROTONIX) 40 MG tablet Take 1 tablet (40 mg total) by mouth once daily. 90 tablet 0    acetaminophen (TYLENOL) 650 MG TbSR Take 1 tablet (650 mg total) by mouth every 6 to 8 hours as needed (pain (mild-moderate)). 120 tablet 0    aspirin (ECOTRIN) 81 MG EC tablet Take 81 mg by mouth once daily.      benzonatate (TESSALON) 200 MG capsule Take 1 capsule (200 mg total) by mouth 3 (three) times daily as needed for Cough. 21 capsule 0    celecoxib (CELEBREX) 200 MG capsule Take 1 capsule (200 mg total) by mouth as needed.      colchicine, gout, (COLCRYS) 0.6 mg tablet Take 1 tablet (0.6 mg total) by mouth once daily. As needed for gout 30 tablet 11    dexAMETHasone (DECADRON) 6 MG tablet Take 1 tablet (6 mg total) by mouth once daily. for 5 days 5 tablet 0    fluticasone furoate-vilanteroL (BREO) 100-25 mcg/dose diskus inhaler Inhale 1 puff into the lungs once daily. Controller 60 each 11    fluticasone propionate (FLONASE) 50 mcg/actuation nasal spray SPRAY 2 pumps INTO EACH NOSTRIL ONCE DAILY 16 mL 3    folic acid (FOLVITE) 1 MG tablet Take 1 tablet (1 mg total) by mouth once daily. 90 tablet 3    melatonin (MELATIN) 3 mg tablet Take 2 tablets (6 mg total) by mouth nightly as needed for Insomnia.  0    montelukast (SINGULAIR) 10 mg tablet Take 1 tablet (10 mg total) by mouth every evening. 90 tablet 11    multivitamin (THERAGRAN) tablet Take 1 tablet by  mouth once daily. 90 tablet 3    prenatal no122/iron/folic acid (PRENATAL MULTI ORAL) Take 1 tablet by mouth once daily.      QUEtiapine (SEROQUEL) 50 MG tablet Take 1 tablet (50 mg total) by mouth every evening. 30 tablet 0    tiotropium bromide (SPIRIVA RESPIMAT) 2.5 mcg/actuation inhaler Inhale 2 puffs into the lungs once daily. Controller 4 g 2     Facility-Administered Medications Ordered in Other Encounters   Medication Dose Route Frequency Provider Last Rate Last Admin    mupirocin 2 % ointment   Nasal On Call Procedure Kang Diaz MD   Given at 10/25/23 1045    tranexamic acid (CYKLOKAPRON) 1,000 mg in sodium chloride 0.9 % 100 mL IVPB (MB+)  1,000 mg Intravenous Once Kang Diaz MD        tranexamic acid (CYKLOKAPRON) 1,000 mg in sodium chloride 0.9 % 100 mL IVPB (MB+)  1,000 mg Intravenous Once Kang Diaz MD         Discharge Medication List as of 11/19/2023  2:13 PM        START taking these medications    Details   dexAMETHasone (DECADRON) 6 MG tablet Take 1 tablet (6 mg total) by mouth once daily. for 5 days, Starting Mon 11/20/2023, Until Sat 11/25/2023, Normal           CONTINUE these medications which have NOT CHANGED    Details   allopurinoL (ZYLOPRIM) 100 MG tablet Take 2 tablets (200 mg total) by mouth once daily., Starting Tue 8/22/2023, Normal      amLODIPine (NORVASC) 5 MG tablet TAKE 1 TABLET BY MOUTH EVERY DAY, Starting Tue 10/10/2023, Normal      atorvastatin (LIPITOR) 80 MG tablet TAKE 1 TABLET BY MOUTH EVERY DAY, Normal      carvediloL (COREG) 6.25 MG tablet Take 1 tablet (6.25 mg total) by mouth 2 (two) times daily with meals., Starting Fri 6/9/2023, Until Sat 6/8/2024, Normal      hydroCHLOROthiazide (HYDRODIURIL) 25 MG tablet Take 1 tablet (25 mg total) by mouth once daily., Starting Mon 7/3/2023, Until Tue 7/2/2024, Normal      pantoprazole (PROTONIX) 40 MG tablet Take 1 tablet (40 mg total) by mouth once daily., Starting Wed 10/4/2023, Until Tue 1/2/2024, Normal       acetaminophen (TYLENOL) 650 MG TbSR Take 1 tablet (650 mg total) by mouth every 6 to 8 hours as needed (pain (mild-moderate))., Starting Mon 10/23/2023, Normal      aspirin (ECOTRIN) 81 MG EC tablet Take 81 mg by mouth once daily., Historical Med      benzonatate (TESSALON) 200 MG capsule Take 1 capsule (200 mg total) by mouth 3 (three) times daily as needed for Cough., Starting Tue 11/14/2023, Until Tue 11/21/2023 at 2359, Normal      celecoxib (CELEBREX) 200 MG capsule Take 1 capsule (200 mg total) by mouth as needed., Starting Tue 10/31/2023, No Print      colchicine, gout, (COLCRYS) 0.6 mg tablet Take 1 tablet (0.6 mg total) by mouth once daily. As needed for gout, Starting Tue 10/31/2023, Until Wed 10/30/2024, No Print      fluticasone furoate-vilanteroL (BREO) 100-25 mcg/dose diskus inhaler Inhale 1 puff into the lungs once daily. Controller, Starting Wed 11/1/2023, Until Thu 10/31/2024, Normal      fluticasone propionate (FLONASE) 50 mcg/actuation nasal spray SPRAY 2 pumps INTO EACH NOSTRIL ONCE DAILY, Normal      folic acid (FOLVITE) 1 MG tablet Take 1 tablet (1 mg total) by mouth once daily., Starting Wed 11/1/2023, Until Thu 10/31/2024, Normal      melatonin (MELATIN) 3 mg tablet Take 2 tablets (6 mg total) by mouth nightly as needed for Insomnia., Starting Tue 5/30/2023, No Print      montelukast (SINGULAIR) 10 mg tablet Take 1 tablet (10 mg total) by mouth every evening., Starting Sat 5/27/2023, Normal      multivitamin (THERAGRAN) tablet Take 1 tablet by mouth once daily., Starting Wed 11/1/2023, Until Thu 10/31/2024, Normal      prenatal no122/iron/folic acid (PRENATAL MULTI ORAL) Take 1 tablet by mouth once daily., Historical Med      tiotropium bromide (SPIRIVA RESPIMAT) 2.5 mcg/actuation inhaler Inhale 2 puffs into the lungs once daily. Controller, Starting Wed 11/1/2023, Until u 10/31/2024, Normal      QUEtiapine (SEROQUEL) 50 MG tablet Take 1 tablet (50 mg total) by mouth every evening.,  Starting Tue 10/31/2023, Until Wed 10/30/2024, Normal               I have seen and examined this patient within the last 30 days. My clinical findings that support the need for the home health skilled services and home bound status are the following:no   Weakness/numbness causing balance and gait disturbance due to Infection making it taxing to leave home.     Diet:   regular diet    Labs:  none    Referrals/ Consults  Physical Therapy to evaluate and treat. Evaluate for home safety and equipment needs; Establish/upgrade home exercise program. Perform / instruct on therapeutic exercises, gait training, transfer training, and Range of Motion.  Occupational Therapy to evaluate and treat. Evaluate home environment for safety and equipment needs. Perform/Instruct on transfers, ADL training, ROM, and therapeutic exercises.    Activities:   activity as tolerated    Nursing:   Agency to admit patient within 24 hours of hospital discharge unless specified on physician order or at patient request    SN to complete comprehensive assessment including routine vital signs. Instruct on disease process and s/s of complications to report to MD. Review/verify medication list sent home with the patient at time of discharge  and instruct patient/caregiver as needed. Frequency may be adjusted depending on start of care date.     Skilled nurse to perform up to 3 visits PRN for symptoms related to diagnosis    Notify MD if SBP > 160 or < 90; DBP > 90 or < 50; HR > 120 or < 50; Temp > 101; O2 < 88%    Ok to schedule additional visits based on staff availability and patient request on consecutive days within the home health episode.    When multiple disciplines ordered:    Start of Care occurs on Sunday - Wednesday schedule remaining discipline evaluations as ordered on separate consecutive days following the start of care.    Thursday SOC -schedule subsequent evaluations Friday and Monday the following week.     Friday - Saturday SOC -  schedule subsequent discipline evaluations on consecutive days starting Monday of the following week.    For all post-discharge communication and subsequent orders please contact patient's primary care physician. If unable to reach primary care physician or do not receive response within 30 minutes, please contact primary care physician for clinical staff order clarification    Miscellaneous   Home Oxygen:  Oxygen at 1-2 L/min nasal canula to be used:  As needed for SOB.    Home Health Aide:  Physical Therapy Three times weekly and Occupational Therapy Three times weekly    Wound Care Orders  no    I certify that this patient is confined to her home and needs physical therapy and occupational therapy.        Daljit Trivedi MD  [facility]  11/20/2023

## 2023-11-20 NOTE — PHYSICIAN QUERY
PT Name: Jessica Floyd  MR #: [id number]     DOCUMENTATION CLARIFICATION     CDS: Amanda Echevarria RN, CCDS   Contact Information: [email or web address]    This form is a permanent document in the medical record.     Query Date: November 20, 2023    By submitting this query, we are merely seeking further clarification of documentation.  Please utilize your independent clinical judgment when addressing the question(s) below.  The Medical Record contains the following   Indicators   Supporting Clinical Findings Location in Medical Record   x Documentation of Respiratory Failure, ARDS Acute hypoxic respiratory failure 11/15-11/19 Hospital Medicine   x RR     O2 sat     O2 use 11/15 2044: R 29, SpO2 94% no oxygen documented  11/15 2303: R 26, SpO2 96% on 2L via Nasal Cannula  11/16 0838: R 13, SpO2 94% on 3L via Nasal Cannula  11/17 0814: R 21, SpO2 95% on 2L via Nasal Cannula  11/19 0400: R 17, SpO2 96% no oxygen documented Flowsheets   x Subjective Respiratory Signs/Symptoms Positive for shortness of breath 11/16 H&P   x Objective Respiratory Signs/Symptoms Pulmonary effort is normal. No respiratory distress. 11/16 H&P   x Acute/Chronic Illness 73 y.o. female with past medical history of CAD, HLD, ESBL UTI, subarachnoid hemorrhage, mild pulmonary HTN, recently thought to have COPD/emphysema during a hospital admission and started on inhalers/singular and 1L NC prn.  11/15 H&P   x Treatment Oxygen PRN  Pulse Oximetry Continuous then Q4H Orders       The clinical guidelines noted are only a system guideline. It does not replace the providers clinical judgment.    [facility] Approved Diagnostic Criteria      Acute Respiratory Failure    Hypoxic: ABG pO2<60 mmHg or O2 sat of <91% on RA   AND/OR   Hypercapnic: ABG pCO2>50 mmHg with pH <7.35   AND   Respiratory symptoms documented (Subjective: SOB; Objective: Tachypnea, respiratory distress, increased work of breathing, unable to speak in complete sentences,  labored breathing, use of accessory muscles, RR>26, cyanosis, dyspnea, wheezing, stridor, lethargy)      Chronic Respiratory Failure   Hypoxic: Continuous home oxygen    AND/OR   Hypercapnic: Normal pH with high CO2 (ex. COPD)      Acute on Chronic Respiratory Failure   Hypoxic: ABG pO2>10mmHg below baseline OR ABG pO2<60 mmHg OR SpO2<91% on usual home O2  OR O2>2L/min over baseline home O2   AND/OR   Hypercapnic: ABG pCO2>50 mmHg OR pCO2>10mmHg over baseline and pH <7.35   AND   Respiratory symptoms documented      Acute Respiratory Distress Syndrome (ARDS) - an acute, diffuse, inflammatory form of lung injury. Suspect with progressive dyspnea, hypoxemic respiratory failure, and bilateral alveolar infiltrates on chest imaging within 6 to 72 hours of an inciting event.   Acute Respiratory Distress - Generally describes less severe respiratory symptoms (tachypnea, in respiratory distress, increased work of breathing, unable to speak in complete sentences, labored breathing, use of accessory muscles, RR> 24, cyanosis, dyspnea, wheezing, stridor, lethargy) without sufficient measurements (pO2, SpO2, pH, and pCO2) to meet criteria for respiratory failure)   Acute Respiratory Insufficiency - Generally describes less severe respiratory symptoms and measurements (pO2, SpO2, pH, and pCO2) not meeting criteria for respiratory failure         Due to the conflicting clinical picture, please clinically validate the diagnosis of Acute Hypoxic Respiratory Failure.    If validated, please provide additional clinical support for the diagnosis.     [    ] Acute Hypoxic Respiratory Failure has been ruled out   [  x  ] Acute Hypoxic Respiratory Failure has been ruled out, other diagnosis ruled in:   [ x  ] Acute Respiratory Insufficiency   [   ] Other (please specify):__________________   [    ] Acute Respiratory Failure with Hypoxia diagnosis is confirmed.    Additional clinical support/decision-making indicators for the  diagnosis include:  _______________________________________________   [    ] Other clarification (please specify): ___________________     Please document in your progress notes daily for the duration of treatment until resolved and include in your discharge summary.     Reference:    KELSEY Howe MD. (2020, March 13). Acute respiratory distress syndrome: Clinical features, diagnosis, and complications in adults ([id number] PAULINE Ervin MD & [id number] NITIN Plata MD, Eds.). Retrieved November 13, 2020, from [email or web address]-[email or web address]-distress-syndrome-[email or web address]-features-diagnosis-[email or web address]-complications-in-adults?search=ards&source=search_result&selectedTitle=1~150&usage_type=default&display_rank=1  Form No. 99530

## 2023-11-21 ENCOUNTER — TELEPHONE (OUTPATIENT)
Dept: TRANSPLANT | Facility: CLINIC | Age: 73
End: 2023-11-21
Payer: MEDICARE

## 2023-11-21 NOTE — TELEPHONE ENCOUNTER
1314: Returned call to patient's  but received no answer at the number he provided not at the home number. Was unable to leave a message. Will call again later.  Appointments rescheduled to Dr. Roberts's next available opening, which in on Friday, 12/1/23.     1442: Called the patient's  again and he answered. Informed him that his wife's appointments have been rescheduled to Friday, 12/1/23. Reviewed the times and locations for each appointment. Mr. Floyd verbalized his understanding and accepted the appointments on behalf of his wife. All questions at this time were answered to his satisfaction. Will also mail printed appointment slips to them.          ----- Message from Amarilis Fry MA sent at 11/21/2023 12:32 PM CST -----  Regarding: FW: Appt  Contact: [phone] Sandie/      ----- Message -----  From: Malick Fry  Sent: 11/20/2023   4:42 PM CST  To: [facility] Staff  Subject: Appt                                             Pt  is calling to rs appt due to the pt had COVID and was unable to make it to the last appt. Please call would like to discuss.

## 2023-11-27 ENCOUNTER — OFFICE VISIT (OUTPATIENT)
Dept: PRIMARY CARE CLINIC | Facility: CLINIC | Age: 73
End: 2023-11-27
Attending: FAMILY MEDICINE
Payer: MEDICARE

## 2023-11-27 ENCOUNTER — OFFICE VISIT (OUTPATIENT)
Dept: HOME HEALTH SERVICES | Facility: CLINIC | Age: 73
End: 2023-11-27
Payer: MEDICARE

## 2023-11-27 DIAGNOSIS — U07.1 COVID-19: ICD-10-CM

## 2023-11-27 DIAGNOSIS — M25.519 CHRONIC SHOULDER PAIN, UNSPECIFIED LATERALITY: ICD-10-CM

## 2023-11-27 DIAGNOSIS — G89.29 CHRONIC SHOULDER PAIN, UNSPECIFIED LATERALITY: ICD-10-CM

## 2023-11-27 DIAGNOSIS — R26.89 IMBALANCE: ICD-10-CM

## 2023-11-27 DIAGNOSIS — R06.02 SOB (SHORTNESS OF BREATH) ON EXERTION: Primary | ICD-10-CM

## 2023-11-27 PROCEDURE — 3075F SYST BP GE 130 - 139MM HG: CPT | Mod: CPTII,S$GLB,, | Performed by: NURSE PRACTITIONER

## 2023-11-27 PROCEDURE — 3288F FALL RISK ASSESSMENT DOCD: CPT | Mod: CPTII,S$GLB,, | Performed by: NURSE PRACTITIONER

## 2023-11-27 PROCEDURE — 3079F PR MOST RECENT DIASTOLIC BLOOD PRESSURE 80-89 MM HG: ICD-10-PCS | Mod: CPTII,S$GLB,, | Performed by: NURSE PRACTITIONER

## 2023-11-27 PROCEDURE — 1159F PR MEDICATION LIST DOCUMENTED IN MEDICAL RECORD: ICD-10-PCS | Mod: HCNC,CPTII,95, | Performed by: FAMILY MEDICINE

## 2023-11-27 PROCEDURE — 1125F AMNT PAIN NOTED PAIN PRSNT: CPT | Mod: CPTII,S$GLB,, | Performed by: NURSE PRACTITIONER

## 2023-11-27 PROCEDURE — 99348 HOME/RES VST EST LOW MDM 30: CPT | Mod: S$GLB,,, | Performed by: NURSE PRACTITIONER

## 2023-11-27 PROCEDURE — 99215 OFFICE O/P EST HI 40 MIN: CPT | Mod: HCNC,95,, | Performed by: FAMILY MEDICINE

## 2023-11-27 PROCEDURE — 1160F RVW MEDS BY RX/DR IN RCRD: CPT | Mod: HCNC,CPTII,95, | Performed by: FAMILY MEDICINE

## 2023-11-27 PROCEDURE — 3075F PR MOST RECENT SYSTOLIC BLOOD PRESS GE 130-139MM HG: ICD-10-PCS | Mod: CPTII,S$GLB,, | Performed by: NURSE PRACTITIONER

## 2023-11-27 PROCEDURE — 3288F PR FALLS RISK ASSESSMENT DOCUMENTED: ICD-10-PCS | Mod: CPTII,S$GLB,, | Performed by: NURSE PRACTITIONER

## 2023-11-27 PROCEDURE — 1101F PT FALLS ASSESS-DOCD LE1/YR: CPT | Mod: CPTII,S$GLB,, | Performed by: NURSE PRACTITIONER

## 2023-11-27 PROCEDURE — 3079F DIAST BP 80-89 MM HG: CPT | Mod: CPTII,S$GLB,, | Performed by: NURSE PRACTITIONER

## 2023-11-27 PROCEDURE — 3044F HG A1C LEVEL LT 7.0%: CPT | Mod: HCNC,CPTII,95, | Performed by: FAMILY MEDICINE

## 2023-11-27 PROCEDURE — 1111F PR DISCHARGE MEDS RECONCILED W/ CURRENT OUTPATIENT MED LIST: ICD-10-PCS | Mod: HCNC,CPTII,95, | Performed by: FAMILY MEDICINE

## 2023-11-27 PROCEDURE — 99348 PR HOME VISIT,ESTAB PATIENT,LEVEL II: ICD-10-PCS | Mod: S$GLB,,, | Performed by: NURSE PRACTITIONER

## 2023-11-27 PROCEDURE — 3044F HG A1C LEVEL LT 7.0%: CPT | Mod: CPTII,S$GLB,, | Performed by: NURSE PRACTITIONER

## 2023-11-27 PROCEDURE — 1111F DSCHRG MED/CURRENT MED MERGE: CPT | Mod: HCNC,CPTII,95, | Performed by: FAMILY MEDICINE

## 2023-11-27 PROCEDURE — 1160F PR REVIEW ALL MEDS BY PRESCRIBER/CLIN PHARMACIST DOCUMENTED: ICD-10-PCS | Mod: HCNC,CPTII,95, | Performed by: FAMILY MEDICINE

## 2023-11-27 PROCEDURE — 1101F PR PT FALLS ASSESS DOC 0-1 FALLS W/OUT INJ PAST YR: ICD-10-PCS | Mod: CPTII,S$GLB,, | Performed by: NURSE PRACTITIONER

## 2023-11-27 PROCEDURE — 1159F MED LIST DOCD IN RCRD: CPT | Mod: HCNC,CPTII,95, | Performed by: FAMILY MEDICINE

## 2023-11-27 PROCEDURE — 3044F PR MOST RECENT HEMOGLOBIN A1C LEVEL <7.0%: ICD-10-PCS | Mod: HCNC,CPTII,95, | Performed by: FAMILY MEDICINE

## 2023-11-27 PROCEDURE — 3044F PR MOST RECENT HEMOGLOBIN A1C LEVEL <7.0%: ICD-10-PCS | Mod: CPTII,S$GLB,, | Performed by: NURSE PRACTITIONER

## 2023-11-27 PROCEDURE — 99215 PR OFFICE/OUTPT VISIT, EST, LEVL V, 40-54 MIN: ICD-10-PCS | Mod: HCNC,95,, | Performed by: FAMILY MEDICINE

## 2023-11-27 PROCEDURE — 1125F PR PAIN SEVERITY QUANTIFIED, PAIN PRESENT: ICD-10-PCS | Mod: CPTII,S$GLB,, | Performed by: NURSE PRACTITIONER

## 2023-11-27 RX ORDER — FLUTICASONE FUROATE AND VILANTEROL 100; 25 UG/1; UG/1
1 POWDER RESPIRATORY (INHALATION) DAILY
Qty: 30 EACH | Refills: 11 | Status: SHIPPED | OUTPATIENT
Start: 2023-11-27 | End: 2024-11-26

## 2023-11-27 NOTE — PROGRESS NOTES
Subjective:       Patient ID: Jessica Floyd is a 73 y.o. female.    Chief Complaint: COVID f/u    The patient location is: home  The chief complaint leading to consultation is: above    Visit type: audiovisual    Face to Face time with patient: 30 minutes 40 minutes of total time spent on the encounter, which includes face to face time and non-face to face time preparing to see the patient (eg, review of tests), Obtaining and/or reviewing separately obtained history, Documenting clinical information in the electronic or other health record, Independently interpreting results (not separately reported) and communicating results to the patient/family/caregiver, or Care coordination (not separately reported).     Each patient to whom he or she provides medical services by telemedicine is:  (1) informed of the relationship between the physician and patient and the respective role of any other health care provider with respect to management of the patient; and (2) notified that he or she may decline to receive medical services by telemedicine and may withdraw from such care at any time.    Notes:     Patient presents today for follow-up on COVID 19 hospitalization.  Patient states that she is doing much better today she has not required oxygen except for 1 time at home.  Patient states that breathing is much better.  She is using the spirometer and is actively walking around her home.  Patient has follow-up with pulmonology next week.  Patient states it anemia has been managed by Hematology with iron infusions and is doing well at present.  Patient has 2nd opinion follow-up for for shoulder surgery at [place] next week.      Review of Systems   Constitutional:  Positive for activity change, appetite change and fatigue.   Eyes: Negative.    Respiratory:  Positive for cough, chest tightness and shortness of breath.    Cardiovascular:  Negative for chest pain, palpitations and leg swelling.   Gastrointestinal: Negative.     Musculoskeletal:  Positive for arthralgias, joint swelling and myalgias.   Skin: Negative.    Neurological:  Negative for dizziness, weakness, light-headedness and headaches.   All other systems reviewed and are negative.        Objective:      Physical Exam  Vitals reviewed.   Constitutional:       Appearance: Normal appearance. She is well-developed.   HENT:      Head: Normocephalic and atraumatic.   Eyes:      Extraocular Movements: Extraocular movements intact.   Pulmonary:      Effort: Pulmonary effort is normal.   Musculoskeletal:         General: Normal range of motion.      Cervical back: Neck supple.      Right lower leg: No edema.      Left lower leg: No edema.   Neurological:      Mental Status: She is alert and oriented to person, place, and time.   Psychiatric:         Mood and Affect: Mood normal.         Behavior: Behavior normal.         Thought Content: Thought content normal.         Judgment: Judgment normal.         Assessment:       1. SOB (shortness of breath) on exertion    2. Imbalance        Plan:   1. SOB (shortness of breath) on exertion  -     fluticasone furoate-vilanteroL (BREO) 100-25 mcg/dose diskus inhaler; Inhale 1 puff into the lungs once daily. Controller  Dispense: 30 each; Refill: 11  -     tiotropium bromide (SPIRIVA RESPIMAT) 2.5 mcg/actuation inhaler; Inhale 2 puffs into the lungs once daily. Controller  Dispense: 4 g; Refill: 2    2. Imbalance  -     Ambulatory referral/consult to Physical/Occupational Therapy; Future; Expected date: 12/04/2023    Patient overall doing well.  Patient is reassured and advised to keep follow up appointments for her bleeding/COVID-19 as well as her shoulder.  Patient will come see me in the new year.  No need for labs at this time.  E.d. prompts discussed with patient.  Meds up-to-date    Answers submitted by the patient for this visit:  Review of Systems Questionnaire (Submitted on 11/27/2023)  activity change: Yes  unexpected weight change:  No  neck pain: No  hearing loss: No  rhinorrhea: Yes  trouble swallowing: No  eye discharge: No  visual disturbance: No  chest tightness: No  wheezing: No  chest pain: No  palpitations: No  blood in stool: No  constipation: No  vomiting: No  diarrhea: No  polydipsia: No  polyuria: Yes  difficulty urinating: No  hematuria: No  menstrual problem: No  dysuria: No  joint swelling: No  arthralgias: No  headaches: Yes  weakness: Yes  confusion: No  dysphoric mood: Yes

## 2023-11-29 ENCOUNTER — OUTPATIENT CASE MANAGEMENT (OUTPATIENT)
Dept: ADMINISTRATIVE | Facility: OTHER | Age: 73
End: 2023-11-29
Payer: MEDICARE

## 2023-11-29 ENCOUNTER — OFFICE VISIT (OUTPATIENT)
Dept: SPORTS MEDICINE | Facility: CLINIC | Age: 73
End: 2023-11-29
Payer: MEDICARE

## 2023-11-29 VITALS
OXYGEN SATURATION: 90 % | DIASTOLIC BLOOD PRESSURE: 80 MMHG | DIASTOLIC BLOOD PRESSURE: 82 MMHG | HEART RATE: 90 BPM | HEART RATE: 88 BPM | HEIGHT: 64 IN | TEMPERATURE: 98 F | SYSTOLIC BLOOD PRESSURE: 138 MMHG | RESPIRATION RATE: 15 BRPM | BODY MASS INDEX: 20.32 KG/M2 | SYSTOLIC BLOOD PRESSURE: 132 MMHG | WEIGHT: 119.06 LBS

## 2023-11-29 DIAGNOSIS — M25.511 RIGHT SHOULDER PAIN, UNSPECIFIED CHRONICITY: ICD-10-CM

## 2023-11-29 DIAGNOSIS — S46.011A TRAUMATIC COMPLETE TEAR OF RIGHT ROTATOR CUFF, INITIAL ENCOUNTER: Primary | ICD-10-CM

## 2023-11-29 PROCEDURE — 99999 PR PBB SHADOW E&M-EST. PATIENT-LVL III: CPT | Mod: PBBFAC,HCNC,, | Performed by: ORTHOPAEDIC SURGERY

## 2023-11-29 PROCEDURE — 20611 LARGE JOINT ASPIRATION/INJECTION: R SUBACROMIAL BURSA: ICD-10-PCS | Mod: HCNC,RT,S$GLB, | Performed by: ORTHOPAEDIC SURGERY

## 2023-11-29 PROCEDURE — 1159F MED LIST DOCD IN RCRD: CPT | Mod: HCNC,CPTII,S$GLB, | Performed by: ORTHOPAEDIC SURGERY

## 2023-11-29 PROCEDURE — 3079F DIAST BP 80-89 MM HG: CPT | Mod: HCNC,CPTII,S$GLB, | Performed by: ORTHOPAEDIC SURGERY

## 2023-11-29 PROCEDURE — 3075F PR MOST RECENT SYSTOLIC BLOOD PRESS GE 130-139MM HG: ICD-10-PCS | Mod: HCNC,CPTII,S$GLB, | Performed by: ORTHOPAEDIC SURGERY

## 2023-11-29 PROCEDURE — 20611 DRAIN/INJ JOINT/BURSA W/US: CPT | Mod: HCNC,RT,S$GLB, | Performed by: ORTHOPAEDIC SURGERY

## 2023-11-29 PROCEDURE — 99214 PR OFFICE/OUTPT VISIT, EST, LEVL IV, 30-39 MIN: ICD-10-PCS | Mod: 25,HCNC,S$GLB, | Performed by: ORTHOPAEDIC SURGERY

## 2023-11-29 PROCEDURE — 1125F PR PAIN SEVERITY QUANTIFIED, PAIN PRESENT: ICD-10-PCS | Mod: HCNC,CPTII,S$GLB, | Performed by: ORTHOPAEDIC SURGERY

## 2023-11-29 PROCEDURE — 99999 PR PBB SHADOW E&M-EST. PATIENT-LVL III: ICD-10-PCS | Mod: PBBFAC,HCNC,, | Performed by: ORTHOPAEDIC SURGERY

## 2023-11-29 PROCEDURE — 99214 OFFICE O/P EST MOD 30 MIN: CPT | Mod: 25,HCNC,S$GLB, | Performed by: ORTHOPAEDIC SURGERY

## 2023-11-29 PROCEDURE — 3079F PR MOST RECENT DIASTOLIC BLOOD PRESSURE 80-89 MM HG: ICD-10-PCS | Mod: HCNC,CPTII,S$GLB, | Performed by: ORTHOPAEDIC SURGERY

## 2023-11-29 PROCEDURE — 1111F DSCHRG MED/CURRENT MED MERGE: CPT | Mod: HCNC,CPTII,S$GLB, | Performed by: ORTHOPAEDIC SURGERY

## 2023-11-29 PROCEDURE — 1111F PR DISCHARGE MEDS RECONCILED W/ CURRENT OUTPATIENT MED LIST: ICD-10-PCS | Mod: HCNC,CPTII,S$GLB, | Performed by: ORTHOPAEDIC SURGERY

## 2023-11-29 PROCEDURE — 3008F PR BODY MASS INDEX (BMI) DOCUMENTED: ICD-10-PCS | Mod: HCNC,CPTII,S$GLB, | Performed by: ORTHOPAEDIC SURGERY

## 2023-11-29 PROCEDURE — 3044F HG A1C LEVEL LT 7.0%: CPT | Mod: HCNC,CPTII,S$GLB, | Performed by: ORTHOPAEDIC SURGERY

## 2023-11-29 PROCEDURE — 1159F PR MEDICATION LIST DOCUMENTED IN MEDICAL RECORD: ICD-10-PCS | Mod: HCNC,CPTII,S$GLB, | Performed by: ORTHOPAEDIC SURGERY

## 2023-11-29 PROCEDURE — 3075F SYST BP GE 130 - 139MM HG: CPT | Mod: HCNC,CPTII,S$GLB, | Performed by: ORTHOPAEDIC SURGERY

## 2023-11-29 PROCEDURE — 1125F AMNT PAIN NOTED PAIN PRSNT: CPT | Mod: HCNC,CPTII,S$GLB, | Performed by: ORTHOPAEDIC SURGERY

## 2023-11-29 PROCEDURE — 3008F BODY MASS INDEX DOCD: CPT | Mod: HCNC,CPTII,S$GLB, | Performed by: ORTHOPAEDIC SURGERY

## 2023-11-29 PROCEDURE — 3044F PR MOST RECENT HEMOGLOBIN A1C LEVEL <7.0%: ICD-10-PCS | Mod: HCNC,CPTII,S$GLB, | Performed by: ORTHOPAEDIC SURGERY

## 2023-11-29 RX ORDER — METHYLPREDNISOLONE ACETATE 40 MG/ML
40 INJECTION, SUSPENSION INTRA-ARTICULAR; INTRALESIONAL; INTRAMUSCULAR; SOFT TISSUE
Status: DISCONTINUED | OUTPATIENT
Start: 2023-11-29 | End: 2023-11-29 | Stop reason: HOSPADM

## 2023-11-29 RX ADMIN — METHYLPREDNISOLONE ACETATE 40 MG: 40 INJECTION, SUSPENSION INTRA-ARTICULAR; INTRALESIONAL; INTRAMUSCULAR; SOFT TISSUE at 09:11

## 2023-11-29 NOTE — ASSESSMENT & PLAN NOTE
--denies shortness of breath, cough  --urine oxygen in home if necessary; maintain sats over 90%  --lung sounds clear to auscultation

## 2023-11-29 NOTE — PROGRESS NOTES
ESTABLISHED PATIENT    History 11/29/2023:  Jessica returns to clinic today for follow-up of right shoulder pain.  She wants to discuss options for her right shoulder.  She is had a few significant events occurred recently.  She was initially booked for right reverse total shoulder arthroplasty but secondary to anxiety did not want to proceed with the surgery.  She subsequently had a fall and sustained a left hip fracture which required a left total hip arthroplasty.  Her primary complaint is still pain in the shoulder but she still has good active range of motion of the right shoulder.  She wants to discuss other minimally invasive options other than a reverse total shoulder replacement.  She wants to discuss a balloon procedure as an option.    History 7/24/2023:  Jessica returns here today for follow-up evaluation of her right shoulder.  She has been attending physical therapy but continues to have constant right shoulder pain with limited range of motion and weakness.  Of note, she is still recovering from a recent total hip arthroplasty due to femoral neck fracture.    History 5/1/2023:   73 y.o. Female with a history of right shoulder pain who is referred today by Armani Cai PA-C. She is retired and she states she has been in physical therapy for a while for her knee and her shoulder. That has taken up most of her time. She has had a fall. She also had a incident a few months ago where she lifted herself up from a low toilet and she felt a pop in the shoulder. Armani gave her a CSI and she had some relief.         History 4/10/2023:  Jessica returns here today for follow-up evaluation of right shoulder.  She has been undergoing physical therapy for her large rotator cuff tear and glenohumeral joint osteoarthritis.  There has been improvement in her pain and function since her last visit.  She is still having difficulty performing simple ADLs such as cooking and trouble lifting objects overhead, but overall she  seems to be very happy with her progress.    History 02/28/2023:  Jessica returns here today for follow-up of her right shoulder.  She has a large rotator cuff tear and underlying osteoarthritis.  She has previously failed a subacromial corticosteroid injection and we are attempting to manage her symptoms conservatively.  She was only able to attend 1 visit with physical therapy due to having uncontrollable epistaxis and having to go to the emergency room and ENT several times over the last 2 weeks.  She complains of having significant right shoulder pain and stiffness.    History 02/06/2023:  Jessica returns here today for follow-up evaluation of her right shoulder.  She was given a subacromial corticosteroid injection at her last visit.  While attempting to lift herself up from a restroom toilet, she felt a pop in her shoulder and had immediate discomfort.  She was seen in the emergency room for this.  A CT arthrogram was ordered for further evaluation of her rotator cuff.  She is here today to discuss the results.  She has been using a shoulder sling intermittently for pain relief.    History 01/23/2023:  Jessica returns here today for follow-up evaluation of her right shoulder.  She states that the Medrol Dosepak gave her very minimal relief.  She has had worsening pain and weakness since her last visit.  She is having a lot of difficulty getting dressed and sleeping at night.  She has been taking Tylenol p.m. at night which has helped her get some sleep.    Of note, she states that her knee pain has resolved following the Orthovisc series.    History 01/10/2023:  72 y.o. Female with a history of right shoulder pain who began having pain 1 week ago.  She denies having any precipitating injury or trauma but states that she was moving a lot of boxes which may have irritated her shoulder.  She is having a lot of discomfort with movement and at night while sleeping.  She has had difficulty getting dressed due to the  "pain and is frequently dropping objects due to weakness.        Is affecting ADLs.  Pain is 7/10 at it's worst.      REVIEW OF SYSTEMS   Constitution: Negative. Negative for chills, fever and night sweats.   HENT: Negative for congestion and headaches.    Eyes: Negative for blurred vision, left vision loss and right vision loss.   Cardiovascular: Negative for chest pain and syncope.   Respiratory: Negative for cough and shortness of breath.    Endocrine: Negative for polydipsia, polyphagia and polyuria.   Hematologic/Lymphatic: Negative for bleeding problem. Does not bruise/bleed easily.   Skin: Negative for dry skin, itching and rash.   Musculoskeletal: Negative for falls. Positive for right shoulder pain  Gastrointestinal: Negative for abdominal pain and bowel incontinence.   Genitourinary: Negative for bladder incontinence and nocturia.   Neurological: Negative for disturbances in coordination, loss of balance and seizures.   Psychiatric/Behavioral: Negative for depression. The patient does not have insomnia.    Allergic/Immunologic: Negative for hives and persistent infections.     PHYSICAL EXAMINATION    Vitals: /82   Pulse 88   Ht 5' 4" (1.626 m)   Wt 54 kg (119 lb 0.8 oz)   BMI 20.43 kg/m²     General: The patient appears active and healthy with no apparent physical problems.  No disturbance of mood or affect is demonstrated. Alert and Oriented.    HEENT: Eyes normal, pupils equally round, nose normal.    Resp: Equal and symmetrical chest rises. No wheezing    CV: Regular rate    Neck: Supple; nonpainful range of motion.    Extremities: no cyanosis, clubbing, edema, or diffuse swelling.  Palpable pulses, good capillary refill of the digits.  No coolness, discoloration, edema or obvious varicosities.    Skin: no lesions noted.    Lymphatic: no detected adenopathy in the upper or lower extremities.    Neurologic: normal mental status, normal reflexes, normal gait and balance.  Patient is alert and " oriented to person, place and time.  No flaccidity or spasticity is noted.  No motor or sensory deficits are noted.  Light touch is intact    Orthopaedic:     SHOULDER EXAM - RIGHT    Inspection:   Normal skin color and appearance with no scars.  No muscle atrophy noted.  No scapular winging.      Palpation: No tenderness of the acromioclavicular joint, lateral edge of the acromion, biceps tendon, trapezius muscle or scapulothoracic bursa.  Crepitus of the glenohumeral joint    ROM:      PROM:     FE - 150°    Abd/ER -  80°  Abd/IR -  30°   Add/ER -  40°     AROM:    FE - 150°  with pain beyond 90°.  Abd/ER -  80°  Abd/IR -  30°   Add/ER -  40°  *pain resisted ER       Tests:     - Bonilla, - Neer's, - Cross Arm Adduction, - [place], - Yerguson, - Speed. - Belly Press,  + Jobes, - Lift Off    Stability: - sulcus, - apprehension, - relocation, - load and shift, - DLS      Motor:  Rotator cuff strength is 4/5 supraspinatus, 4/5 infraspinatus, 5/5 subscapularis. Biceps, triceps and deltoid strength is 5/5.      Neuro     Distally there are no paresthesias, and sensation is intact to light touch in the median, ulnar, and radial distributions.  Reflexes are 2/2 biceps, triceps and brachioradialis.        IMAGING    CT Arthrogram Shoulder Right  Order: [id number]  Status: Final result     Visible to patient: Yes (seen)     Next appt: 07/26/2023 at 11:00 AM in Radiology ([facility] DEXA1)     Dx: Acute pain of right shoulder     0 Result Notes  Details    Reading Physician Reading Date Result Priority   Musa Maloney MD  [phone] 2/3/2023 Routine     Narrative & Impression  EXAMINATION:  CT ARTHROGRAM SHOULDER RIGHT     CLINICAL HISTORY:  Shoulder pain, rotator cuff disorder suspected, xray done;  Pain in right shoulder     TECHNIQUE:  Routine right multiplanar CT arthrogram shoulder performed after the intra-articular injection of contrast.     COMPARISON:  None     FINDINGS:  Rotator cuff: There is a large full-thickness  rotator cuff tear involving the entire supra and infraspinatus tendons.  There is tendon retraction to the glenoid rim.  The teres minor and subscapularis are intact.  There is mild volume loss of both supra and infraspinatus muscles.  High-riding humeral head noted abutting the acromion.     Mild AC joint arthropathy.     The biceps tendon not identified, probably torn.     Generalized cartilage thinning/joint space narrowing noted with mild osteophytosis along the inferior margin of the humeral head.  No fractures or marrow replacement process.  No evidence of osteomyelitis.  No axillary lymphadenopathy.  Emphysematous changes noted in the right lung apex.     Impression:     Large full-thickness rotator cuff tear, involving the infraspinatus and supraspinatus tendons, as above.     This report was flagged in Epic as abnormal.        Electronically signed by: Musa Maloney MD  Date:                                            02/03/2023  Time:                                           13:22           Exam Ended: 02/03/23 12:33 Last Resulted: 02/03/23 13:22           X-Ray Shoulder Complete 2 View Right  Order: [id number]  Status: Final result     Visible to patient: Yes (seen)     Next appt: 07/26/2023 at 11:00 AM in Radiology ([facility] DEXA1)     Dx: Right shoulder pain     0 Result Notes  Details    Reading Physician Reading Date Result Priority   Myrna Hameed MD  [phone] 1/27/2023 STAT     Narrative & Impression  EXAMINATION:  XR SHOULDER COMPLETE 2 OR MORE VIEWS RIGHT     CLINICAL HISTORY:  Pain in right shoulder     TECHNIQUE:  Two views of the right shoulder were performed.     COMPARISON:  01/10/2023.     FINDINGS:  No evidence of acute displaced fracture, dislocation, or osseous destructive process.  Suspected remote healed fracture deformity is seen is seen of the right humeral head and neck.  Mild degenerative changes are seen at the glenohumeral and acromioclavicular joints.     Impression:     No  acute osseous abnormality identified.        Electronically signed by: Myrna Hameed MD  Date:                                            01/27/2023  Time:                                           17:41           Exam Ended: 01/27/23 17:33 Last Resulted: 01/27/23 17:41               IMPRESSION       ICD-10-CM ICD-9-CM   1. Right shoulder pain, unspecified chronicity  M25.511 719.41       MEDICATIONS PRESCRIBED      None    RECOMMENDATIONS     Activity modifications given to the patient today  CSI of right sub-acromial bursa performed under ultrasound guidance  RTC in 3 months with Armani Cai PA-C  I advised the patient that based on her current medical comorbidities that surgery right now may not be the best option.  She had relief with a subacromial injection.  I advised her that she can continue to do this for quite some time.  I also advised her she is not a candidate for a balloon procedure.  The only definitive surgery that would help improve her pain would be a reverse total shoulder arthroplasty.  However, I would reserve this procedure until she is medically optimized and subacromial injections have failed.    Large Joint Aspiration/Injection: R subacromial bursa    Date/Time: 11/29/2023 9:45 AM    Performed by: Carlos Garcia MD  Authorized by: Carlos Garcia MD    Consent Done?:  Yes (Verbal)  Indications:  Pain  Site marked: the procedure site was marked    Timeout: prior to procedure the correct patient, procedure, and site was verified    Prep: patient was prepped and draped in usual sterile fashion      Local anesthesia used?: Yes    Local anesthetic:  Co-phenylcaine spray (0.2% Naropin)  Anesthetic total (ml):  4      Details:  Needle Size:  22 G  Ultrasonic Guidance for needle placement?: Yes (Ultrasound guidance was used for needle localization.  Images were saved and stored for documentation.  Dynamic visualization of the needle was continuous throughout the procedure and maintained  accurate placement)    Images are saved and documented.  Approach:  Posterior  Location:  Shoulder  Site:  R subacromial bursa  Medications:  40 mg methylPREDNISolone acetate 40 mg/mL  Medications comment:  5 mL LIDOcaine HCL 10 mg/ml (1%) 10 mg/mL (1 %)  Patient tolerance:  Patient tolerated the procedure well with no immediate complications    All questions were answered, pt will contact us for questions or concerns in the interim.

## 2023-11-29 NOTE — PROGRESS NOTES
[facility]  Medical Chronic Care Home Visit    Encounter Provider: Jan Suárez   PCP: Effie Olmedo MD  Consult Requested By: Dr. Daljit Trivedi    HISTORY OF PRESENT ILLNESS      Patient ID: Jessica Floyd is a 73 y.o. female is being seen in the home due to physical debility that presents a taxing effort to leave the home, to mitigate high risk of hospital readmission and/or due to the limited availability of reliable or safe options for transportation to the point of access to the provider. Prior to treatment on this visit the chart was reviewed and patient verbal consent was obtained.    Chronic medical conditions synopsis:    COVID +. Chest CTA showed no pulmonary embolism. Lower extremity ultrasound showed no deep venous thrombosis. She was put on dexamethasone, remdesivir, and prophylactic dose enoxaparin. Symptoms improved. She finished the course of remdesivir. She was prescribed the rest of the course of dexamethasone.    DECISION MAKING TODAY       Assessment & Plan:  1. COVID-19  Assessment & Plan:  --denies shortness of breath, cough  --urine oxygen in home if necessary; maintain sats over 90%  --lung sounds clear to auscultation    Orders:  -     Ambulatory referral/consult to [facility] - Medical & Palliative           ENVIRONMENT OF CARE      Family and/or Caregiver present at visit?  Yes  Name of Caregiver: sitter with [facility]   History provided by: patient    Advance Care Planning   Advanced Care Planning Status:  Patient has had an ACP conversation  Living Will: No  Power of : No  LaPOST: No         Impression upon entering the home:  Physical Dwelling: single family home   Appearance of home environment: cleaniness: clean, walking pathways: clear, lighting: adequate, and home structure: sound structure  Functional Status: moderate assistance  Mobility: ambulatory with device  Nutritional access: adequate intake and access  Home Health: No, and does not need it  at this time   DME/Supplies: rolling walker and oxygen     Diagnostic tests reviewed/disposition: No diagnosic tests pending after this hospitalization.  Disease/illness education:  COVID  Establishment or re-establishment of referral orders for community resources: No other necessary community resources.   Discussion with other health care providers: No discussion with other health care providers necessary.   Does patient have a PCP at [facility]? Yes   Repatriation plan with PCP? follow-up with PCP within 30d   Does patient have an ostomy (ileostomy, colostomy, suprapubic catheter, nephrostomy tube, tracheostomy, PEG tube, pleurex catheter, cholecystostomy, etc)? No  Were BPAs reviewed? Yes    Social History     Socioeconomic History    Marital status:    Occupational History    Occupation: Retired   Tobacco Use    Smoking status: Former     Current packs/day: 0.00     Average packs/day: 0.5 packs/day for 20.0 years (10.0 ttl pk-yrs)     Types: Cigarettes     Start date: 1979     Quit date: 1999     Years since quittin.9     Passive exposure: Past    Smokeless tobacco: Never   Substance and Sexual Activity    Alcohol use: Yes     Alcohol/week: 7.0 standard drinks of alcohol     Types: 7 Glasses of wine per week     Comment: One glass of wine prior to dinner    Drug use: No    Sexual activity: Yes     Partners: Male   Social History Narrative    .  Has 3 grown daughters.       Social Determinants of Health     Financial Resource Strain: Low Risk  (2023)    Overall Financial Resource Strain (CARDIA)     Difficulty of Paying Living Expenses: Not hard at all   Food Insecurity: No Food Insecurity (2023)    Hunger Vital Sign     Worried About Running Out of Food in the Last Year: Never true     Ran Out of Food in the Last Year: Never true   Transportation Needs: No Transportation Needs (2023)    PRAPARE - Transportation     Lack of Transportation (Medical): No     Lack of  Transportation (Non-Medical): No   Physical Activity: Insufficiently Active (11/27/2023)    Exercise Vital Sign     Days of Exercise per Week: 1 day     Minutes of Exercise per Session: 30 min   Stress: No Stress Concern Present (11/27/2023)    [employer] - Occupational Stress Questionnaire     Feeling of Stress : Not at all   Recent Concern: Stress - Stress Concern Present (10/28/2023)    [employer] - Occupational Stress Questionnaire     Feeling of Stress : To some extent   Social Connections: Socially Integrated (11/27/2023)    Social Connection and Isolation Panel [NHANES]     Frequency of Communication with Friends and Family: More than three times a week     Frequency of Social Gatherings with Friends and Family: More than three times a week     Attends Taoist Services: More than 4 times per year     Active Member of Clubs or Organizations: Yes     Attends Club or Organization Meetings: 1 to 4 times per year     Marital Status:    Housing Stability: Low Risk  (11/27/2023)    Housing Stability Vital Sign     Unable to Pay for Housing in the Last Year: No     Number of Places Lived in the Last Year: 1     Unstable Housing in the Last Year: No         OBJECTIVE:     Vital Signs:  Vitals:    11/27/23 1400   BP: 132/80   Pulse: 90   Resp: 15   Temp: 97.7 °F (36.5 °C)       Review of Systems   Constitutional:  Negative for activity change and appetite change.   HENT:  Negative for congestion and dental problem.    Eyes:  Negative for discharge and itching.   Respiratory:  Negative for choking and chest tightness.    Cardiovascular:  Negative for chest pain and palpitations.   Gastrointestinal:  Negative for rectal pain and vomiting.   Endocrine: Negative for cold intolerance and heat intolerance.   Genitourinary:  Negative for enuresis and flank pain.   Musculoskeletal:  Positive for gait problem. Negative for myalgias and neck pain.    Skin:  Negative for color change and wound.   Allergic/Immunologic: Negative for environmental allergies and food allergies.   Neurological:  Negative for tremors and syncope.   Hematological:  Does not bruise/bleed easily.   Psychiatric/Behavioral:  Negative for decreased concentration and dysphoric mood.      Physical Exam:  Physical Exam  Vitals reviewed.   Constitutional:       Appearance: She is well-developed.   HENT:      Head: Normocephalic and atraumatic.   Eyes:      Pupils: Pupils are equal, round, and reactive to light.   Cardiovascular:      Rate and Rhythm: Normal rate.   Pulmonary:      Effort: Pulmonary effort is normal.      Breath sounds: Normal breath sounds.   Abdominal:      General: Bowel sounds are normal.      Palpations: Abdomen is soft.   Musculoskeletal:         General: Normal range of motion.      Cervical back: Normal range of motion and neck supple.   Skin:     General: Skin is warm and dry.   Neurological:      Mental Status: She is alert. Mental status is at baseline.      GCS: GCS eye subscore is 4. GCS verbal subscore is 5. GCS motor subscore is 6.   Psychiatric:         Attention and Perception: Attention normal.         Mood and Affect: Mood normal.         Speech: Speech normal.     INSTRUCTIONS FOR PATIENT:     Scheduled Follow-up, Appts Reviewed with Modifications if Needed: Yes  Future Appointments   Date Time Provider Department Center   11/29/2023  9:45 AM Carlos Garcia MD [facility] SPORTS [place]   12/1/2023  8:00 AM SIX, MINUTE WALK [facility] PUL WLK [facility]   12/1/2023  8:30 AM PULMONARY FUNCTION [facility] PULMLAB [facility]   12/1/2023  8:45 AM PULMONARY FUNCTION [facility] PULMLAB [facility]   12/1/2023  9:00 AM PULMONARY FUNCTION [facility] PULMLAB [facility]   12/1/2023  9:30 AM Mckay Cosby NP [facility] ORTHO [facility] Ort   12/1/2023 10:30 AM Hina Roberts DO [facility] LUNGTX [facility]   12/15/2023  7:00 AM LAB, SAME DAY [facility] LABDRAW [facility]   12/18/2023 11:20 AM Juan  MD Anh [facility] HEM ONC [facility]   12/18/2023  1:00 PM Kvng Abrams MD [facility] PULMSVC [facility]   12/27/2023 10:30 AM Juan Naranjo MD [facility] RMTLGY [place]   12/29/2023  2:00 PM Carmela Ace NP [facility] PSYCH [facility]   3/5/2024  9:00 AM Michael Lal MD [facility] [id number] [facility]   6/17/2024  9:00 AM [facility] DEXA1 [facility] BMD [facility]         Current Outpatient Medications:     acetaminophen (TYLENOL) 650 MG TbSR, Take 1 tablet (650 mg total) by mouth every 6 to 8 hours as needed (pain (mild-moderate))., Disp: 120 tablet, Rfl: 0    allopurinoL (ZYLOPRIM) 100 MG tablet, Take 2 tablets (200 mg total) by mouth once daily., Disp: 180 tablet, Rfl: 3    amLODIPine (NORVASC) 5 MG tablet, TAKE 1 TABLET BY MOUTH EVERY DAY, Disp: 90 tablet, Rfl: 1    aspirin (ECOTRIN) 81 MG EC tablet, Take 81 mg by mouth once daily., Disp: , Rfl:     atorvastatin (LIPITOR) 80 MG tablet, TAKE 1 TABLET BY MOUTH EVERY DAY, Disp: 90 tablet, Rfl: 3    carvediloL (COREG) 6.25 MG tablet, Take 1 tablet (6.25 mg total) by mouth 2 (two) times daily with meals., Disp: 60 tablet, Rfl: 11    celecoxib (CELEBREX) 200 MG capsule, Take 1 capsule (200 mg total) by mouth as needed., Disp: , Rfl:     colchicine, gout, (COLCRYS) 0.6 mg tablet, Take 1 tablet (0.6 mg total) by mouth once daily. As needed for gout, Disp: 30 tablet, Rfl: 11    fluticasone furoate-vilanteroL (BREO) 100-25 mcg/dose diskus inhaler, Inhale 1 puff into the lungs once daily. Controller, Disp: 30 each, Rfl: 11    fluticasone propionate (FLONASE) 50 mcg/actuation nasal spray, SPRAY 2 pumps INTO EACH NOSTRIL ONCE DAILY, Disp: 16 mL, Rfl: 3    folic acid (FOLVITE) 1 MG tablet, Take 1 tablet (1 mg total) by mouth once daily., Disp: 90 tablet, Rfl: 3    hydroCHLOROthiazide (HYDRODIURIL) 25 MG tablet, Take 1 tablet (25 mg total) by mouth once daily., Disp: 90 tablet, Rfl: 3    melatonin (MELATIN) 3 mg tablet, Take 2 tablets (6 mg total) by mouth nightly as needed for  Insomnia., Disp: , Rfl: 0    montelukast (SINGULAIR) 10 mg tablet, Take 1 tablet (10 mg total) by mouth every evening., Disp: 90 tablet, Rfl: 11    multivitamin (THERAGRAN) tablet, Take 1 tablet by mouth once daily., Disp: 90 tablet, Rfl: 3    pantoprazole (PROTONIX) 40 MG tablet, Take 1 tablet (40 mg total) by mouth once daily., Disp: 90 tablet, Rfl: 0    prenatal no122/iron/folic acid (PRENATAL MULTI ORAL), Take 1 tablet by mouth once daily., Disp: , Rfl:     QUEtiapine (SEROQUEL) 50 MG tablet, Take 1 tablet (50 mg total) by mouth every evening., Disp: 30 tablet, Rfl: 0    tiotropium bromide (SPIRIVA RESPIMAT) 2.5 mcg/actuation inhaler, Inhale 2 puffs into the lungs once daily. Controller, Disp: 4 g, Rfl: 2  No current facility-administered medications for this visit.    Facility-Administered Medications Ordered in Other Visits:     mupirocin 2 % ointment, , Nasal, On Call Procedure, Kang Diaz MD, Given at 10/25/23 1045    tranexamic acid (CYKLOKAPRON) 1,000 mg in sodium chloride 0.9 % 100 mL IVPB (MB+), 1,000 mg, Intravenous, Once, Kang Diaz MD    tranexamic acid (CYKLOKAPRON) 1,000 mg in sodium chloride 0.9 % 100 mL IVPB (MB+), 1,000 mg, Intravenous, Once, Kang Diaz MD    Medication Reconciliation:  Were medications changed during this appointment? No  Were medications in the home? Yes  Is the patient taking the medications as directed? Yes  Does the patient/caregiver understand the medications and changes? Yes  Does updated med list accurately reflects meds patient is currently taking? Yes    Signature: Jan Suárez NP

## 2023-11-29 NOTE — PATIENT INSTRUCTIONS
Instructions:  - [facility] to schedule home follow-up visit with patient in 6 weeks.  - Continue all medications, treatments and therapies as ordered.   - Follow all instructions, recommendations as discussed.  - Maintain Safety Precautions at all times.  - Attend all medical appointments as scheduled.  - For worsening symptoms: call Primary Care Physician or Nurse Practitioner.  - For emergencies, call 911 or immediately report to the nearest emergency room.  - Limit Risks of environmental exposure to coronavirus/COVID-19 as discussed including: social distancing, hand hygiene, and limiting departures from the home for necessities only.

## 2023-11-29 NOTE — PROGRESS NOTES
Outpatient Care Management  Patient Does Not Consent    Patient: Jessica Floyd  MRN:  [id number]  Date of Service:  11/29/2023  Completed by:  Shira Mejias RN    Chief Complaint   Patient presents with    Case Closure       Patient Summary           Consent Received:  Decline   Unable to reach patient.

## 2023-12-01 ENCOUNTER — HOSPITAL ENCOUNTER (OUTPATIENT)
Dept: PULMONOLOGY | Facility: CLINIC | Age: 73
Discharge: HOME | End: 2023-12-01
Payer: MEDICARE

## 2023-12-01 ENCOUNTER — OFFICE VISIT (OUTPATIENT)
Dept: TRANSPLANT | Facility: CLINIC | Age: 73
End: 2023-12-01
Payer: MEDICARE

## 2023-12-01 ENCOUNTER — OFFICE VISIT (OUTPATIENT)
Dept: ORTHOPEDICS | Facility: CLINIC | Age: 73
End: 2023-12-01
Payer: MEDICARE

## 2023-12-01 ENCOUNTER — HOSPITAL ENCOUNTER (OUTPATIENT)
Dept: RADIOLOGY | Facility: HOSPITAL | Age: 73
Discharge: HOME | End: 2023-12-01
Attending: NURSE PRACTITIONER
Payer: MEDICARE

## 2023-12-01 ENCOUNTER — PATIENT MESSAGE (OUTPATIENT)
Dept: PRIMARY CARE CLINIC | Facility: CLINIC | Age: 73
End: 2023-12-01
Payer: MEDICARE

## 2023-12-01 VITALS
DIASTOLIC BLOOD PRESSURE: 83 MMHG | HEIGHT: 63 IN | WEIGHT: 118.63 LBS | SYSTOLIC BLOOD PRESSURE: 155 MMHG | OXYGEN SATURATION: 96 % | TEMPERATURE: 98 F | HEART RATE: 95 BPM | BODY MASS INDEX: 21.02 KG/M2

## 2023-12-01 VITALS — HEIGHT: 63 IN | BODY MASS INDEX: 21.02 KG/M2 | WEIGHT: 118.63 LBS

## 2023-12-01 DIAGNOSIS — J43.2 CENTRILOBULAR EMPHYSEMA: Primary | Chronic | ICD-10-CM

## 2023-12-01 DIAGNOSIS — R05.9 COUGH, UNSPECIFIED TYPE: ICD-10-CM

## 2023-12-01 DIAGNOSIS — J06.9 UPPER RESPIRATORY TRACT INFECTION, UNSPECIFIED TYPE: ICD-10-CM

## 2023-12-01 DIAGNOSIS — R06.02 SOB (SHORTNESS OF BREATH): ICD-10-CM

## 2023-12-01 DIAGNOSIS — Z98.890 POST-OPERATIVE STATE: ICD-10-CM

## 2023-12-01 DIAGNOSIS — Z98.890 POST-OPERATIVE STATE: Primary | ICD-10-CM

## 2023-12-01 DIAGNOSIS — S72.002A LEFT DISPLACED FEMORAL NECK FRACTURE: Primary | ICD-10-CM

## 2023-12-01 DIAGNOSIS — J30.2 SEASONAL ALLERGIC RHINITIS, UNSPECIFIED TRIGGER: ICD-10-CM

## 2023-12-01 LAB
DLCO ADJ PRE: 6.28 ML/(MIN*MMHG) (ref 14.34–25.81)
DLCO SINGLE BREATH LLN: 14.34
DLCO SINGLE BREATH PRE REF: 29.5 %
DLCO SINGLE BREATH REF: 20.08
DLCOC SBVA LLN: 2.72
DLCOC SBVA PRE REF: 45.7 %
DLCOC SBVA REF: 4.21
DLCOC SINGLE BREATH LLN: 14.34
DLCOC SINGLE BREATH PRE REF: 31.3 %
DLCOC SINGLE BREATH REF: 20.08
DLCOCSBVAULN: 5.69
DLCOCSINGLEBREATHULN: 25.81
DLCOSINGLEBREATHULN: 25.81
DLCOVA LLN: 2.72
DLCOVA PRE REF: 43.1 %
DLCOVA PRE: 1.81 ML/(MIN*MMHG*L) (ref 2.72–5.69)
DLCOVA REF: 4.21
DLCOVAULN: 5.69
DLVAADJ PRE: 1.92 ML/(MIN*MMHG*L) (ref 2.72–5.69)
ERV LLN: -16449.42
ERV PRE REF: 88.8 %
ERV REF: 0.58
ERVULN: ABNORMAL
FEF 25 75 LLN: 0.75
FEF 25 75 PRE REF: 31.9 %
FEF 25 75 REF: 1.69
FEV05 LLN: 0.74
FEV05 REF: 1.6
FEV1 FVC LLN: 64
FEV1 FVC PRE REF: 73.8 %
FEV1 FVC REF: 78
FEV1 LLN: 1.44
FEV1 PRE REF: 55.6 %
FEV1 REF: 2.02
FRCPLETH LLN: 1.84
FRCPLETH PREREF: 138.9 %
FRCPLETH REF: 2.66
FRCPLETHULN: 3.48
FVC LLN: 1.88
FVC PRE REF: 74.7 %
FVC REF: 2.62
IVC PRE: 1.78 L (ref 1.88–3.4)
IVC SINGLE BREATH LLN: 1.88
IVC SINGLE BREATH PRE REF: 68.1 %
IVC SINGLE BREATH REF: 2.62
IVCSINGLEBREATHULN: 3.4
LLN IC: -16448.18
PEF LLN: 3.52
PEF PRE REF: 49.2 %
PEF REF: 5.18
PHYSICIAN COMMENT: ABNORMAL
PRE DLCO: 5.92 ML/(MIN*MMHG) (ref 14.34–25.81)
PRE ERV: 0.51 L (ref -16449.42–16450.58)
PRE FEF 25 75: 0.54 L/S (ref 0.75–3.06)
PRE FET 100: 5.38 SEC
PRE FEV05 REF: 47.6 %
PRE FEV1 FVC: 57.36 % (ref 63.86–89.74)
PRE FEV1: 1.12 L (ref 1.44–2.56)
PRE FEV5: 0.76 L (ref 0.74–2.45)
PRE FRC PL: 3.69 L (ref 1.84–3.48)
PRE FVC: 1.95 L (ref 1.88–3.4)
PRE IC: 1.44 L (ref -16448.18–16451.82)
PRE PEF: 2.55 L/S (ref 3.52–6.84)
PRE REF IC: 79 %
PRE RV: 3.18 L (ref 1.5–2.66)
PRE TLC: 5.13 L (ref 3.78–5.76)
RAW PRE REF: 186.2 %
RAW PRE: 5.7 CMH2O*S/L (ref 3.06–3.06)
RAW REF: 3.06
REF IC: 1.82
RV LLN: 1.5
RV PRE REF: 152.9 %
RV REF: 2.08
RVTLC LLN: 35
RVTLC PRE REF: 140.4 %
RVTLC PRE: 61.94 % (ref 34.53–53.71)
RVTLC REF: 44
RVTLCULN: 54
RVULN: 2.66
SGAW PRE REF: 42 %
SGAW PRE: 0.04 1/(CMH2O*S) (ref 0.1–0.1)
SGAW REF: 0.1
TLC LLN: 3.78
TLC PRE REF: 107.6 %
TLC REF: 4.77
TLC ULN: 5.76
ULN IC: ABNORMAL
VA PRE: 3.26 L (ref 4.62–4.62)
VA SINGLE BREATH LLN: 4.62
VA SINGLE BREATH PRE REF: 70.6 %
VA SINGLE BREATH REF: 4.62
VASINGLEBREATHULN: 4.62
VC LLN: 1.88
VC PRE REF: 74.7 %
VC PRE: 1.95 L (ref 1.88–3.4)
VC REF: 2.62
VC ULN: 3.4

## 2023-12-01 PROCEDURE — 1111F DSCHRG MED/CURRENT MED MERGE: CPT | Mod: HCNC,CPTII,S$GLB, | Performed by: INTERNAL MEDICINE

## 2023-12-01 PROCEDURE — 99204 OFFICE O/P NEW MOD 45 MIN: CPT | Mod: HCNC,S$GLB,, | Performed by: INTERNAL MEDICINE

## 2023-12-01 PROCEDURE — 94729 PR C02/MEMBANE DIFFUSE CAPACITY: ICD-10-PCS | Mod: HCNC,S$GLB,, | Performed by: INTERNAL MEDICINE

## 2023-12-01 PROCEDURE — 1159F PR MEDICATION LIST DOCUMENTED IN MEDICAL RECORD: ICD-10-PCS | Mod: HCNC,CPTII,S$GLB, | Performed by: NURSE PRACTITIONER

## 2023-12-01 PROCEDURE — 3077F PR MOST RECENT SYSTOLIC BLOOD PRESSURE >= 140 MM HG: ICD-10-PCS | Mod: HCNC,CPTII,S$GLB, | Performed by: INTERNAL MEDICINE

## 2023-12-01 PROCEDURE — 1111F DSCHRG MED/CURRENT MED MERGE: CPT | Mod: HCNC,CPTII,S$GLB, | Performed by: NURSE PRACTITIONER

## 2023-12-01 PROCEDURE — 3288F FALL RISK ASSESSMENT DOCD: CPT | Mod: HCNC,CPTII,S$GLB, | Performed by: NURSE PRACTITIONER

## 2023-12-01 PROCEDURE — 3079F PR MOST RECENT DIASTOLIC BLOOD PRESSURE 80-89 MM HG: ICD-10-PCS | Mod: HCNC,CPTII,S$GLB, | Performed by: INTERNAL MEDICINE

## 2023-12-01 PROCEDURE — 99213 PR OFFICE/OUTPT VISIT, EST, LEVL III, 20-29 MIN: ICD-10-PCS | Mod: HCNC,S$GLB,, | Performed by: NURSE PRACTITIONER

## 2023-12-01 PROCEDURE — 3079F DIAST BP 80-89 MM HG: CPT | Mod: HCNC,CPTII,S$GLB, | Performed by: INTERNAL MEDICINE

## 2023-12-01 PROCEDURE — 73502 X-RAY EXAM HIP UNI 2-3 VIEWS: CPT | Mod: 26,HCNC,LT, | Performed by: RADIOLOGY

## 2023-12-01 PROCEDURE — 1111F PR DISCHARGE MEDS RECONCILED W/ CURRENT OUTPATIENT MED LIST: ICD-10-PCS | Mod: HCNC,CPTII,S$GLB, | Performed by: NURSE PRACTITIONER

## 2023-12-01 PROCEDURE — 3044F PR MOST RECENT HEMOGLOBIN A1C LEVEL <7.0%: ICD-10-PCS | Mod: HCNC,CPTII,S$GLB, | Performed by: NURSE PRACTITIONER

## 2023-12-01 PROCEDURE — 99999 PR PBB SHADOW E&M-EST. PATIENT-LVL III: CPT | Mod: PBBFAC,HCNC,, | Performed by: NURSE PRACTITIONER

## 2023-12-01 PROCEDURE — 94726 PLETHYSMOGRAPHY LUNG VOLUMES: CPT | Mod: HCNC,S$GLB,, | Performed by: INTERNAL MEDICINE

## 2023-12-01 PROCEDURE — 1159F PR MEDICATION LIST DOCUMENTED IN MEDICAL RECORD: ICD-10-PCS | Mod: HCNC,CPTII,S$GLB, | Performed by: INTERNAL MEDICINE

## 2023-12-01 PROCEDURE — 1111F PR DISCHARGE MEDS RECONCILED W/ CURRENT OUTPATIENT MED LIST: ICD-10-PCS | Mod: HCNC,CPTII,S$GLB, | Performed by: INTERNAL MEDICINE

## 2023-12-01 PROCEDURE — 99213 OFFICE O/P EST LOW 20 MIN: CPT | Mod: HCNC,S$GLB,, | Performed by: NURSE PRACTITIONER

## 2023-12-01 PROCEDURE — 1125F PR PAIN SEVERITY QUANTIFIED, PAIN PRESENT: ICD-10-PCS | Mod: HCNC,CPTII,S$GLB, | Performed by: INTERNAL MEDICINE

## 2023-12-01 PROCEDURE — 1101F PT FALLS ASSESS-DOCD LE1/YR: CPT | Mod: HCNC,CPTII,S$GLB, | Performed by: INTERNAL MEDICINE

## 2023-12-01 PROCEDURE — 1101F PT FALLS ASSESS-DOCD LE1/YR: CPT | Mod: HCNC,CPTII,S$GLB, | Performed by: NURSE PRACTITIONER

## 2023-12-01 PROCEDURE — 3044F HG A1C LEVEL LT 7.0%: CPT | Mod: HCNC,CPTII,S$GLB, | Performed by: INTERNAL MEDICINE

## 2023-12-01 PROCEDURE — 94618 PULMONARY STRESS TESTING: CPT | Mod: HCNC,S$GLB,, | Performed by: INTERNAL MEDICINE

## 2023-12-01 PROCEDURE — 3044F HG A1C LEVEL LT 7.0%: CPT | Mod: HCNC,CPTII,S$GLB, | Performed by: NURSE PRACTITIONER

## 2023-12-01 PROCEDURE — 1101F PR PT FALLS ASSESS DOC 0-1 FALLS W/OUT INJ PAST YR: ICD-10-PCS | Mod: HCNC,CPTII,S$GLB, | Performed by: INTERNAL MEDICINE

## 2023-12-01 PROCEDURE — 1159F MED LIST DOCD IN RCRD: CPT | Mod: HCNC,CPTII,S$GLB, | Performed by: NURSE PRACTITIONER

## 2023-12-01 PROCEDURE — 3288F PR FALLS RISK ASSESSMENT DOCUMENTED: ICD-10-PCS | Mod: HCNC,CPTII,S$GLB, | Performed by: NURSE PRACTITIONER

## 2023-12-01 PROCEDURE — 3008F BODY MASS INDEX DOCD: CPT | Mod: HCNC,CPTII,S$GLB, | Performed by: INTERNAL MEDICINE

## 2023-12-01 PROCEDURE — 1101F PR PT FALLS ASSESS DOC 0-1 FALLS W/OUT INJ PAST YR: ICD-10-PCS | Mod: HCNC,CPTII,S$GLB, | Performed by: NURSE PRACTITIONER

## 2023-12-01 PROCEDURE — 3044F PR MOST RECENT HEMOGLOBIN A1C LEVEL <7.0%: ICD-10-PCS | Mod: HCNC,CPTII,S$GLB, | Performed by: INTERNAL MEDICINE

## 2023-12-01 PROCEDURE — 1160F PR REVIEW ALL MEDS BY PRESCRIBER/CLIN PHARMACIST DOCUMENTED: ICD-10-PCS | Mod: HCNC,CPTII,S$GLB, | Performed by: NURSE PRACTITIONER

## 2023-12-01 PROCEDURE — 99999 PR PBB SHADOW E&M-EST. PATIENT-LVL III: ICD-10-PCS | Mod: PBBFAC,HCNC,, | Performed by: NURSE PRACTITIONER

## 2023-12-01 PROCEDURE — 3288F FALL RISK ASSESSMENT DOCD: CPT | Mod: HCNC,CPTII,S$GLB, | Performed by: INTERNAL MEDICINE

## 2023-12-01 PROCEDURE — 94618 PULMONARY STRESS TESTING: ICD-10-PCS | Mod: HCNC,S$GLB,, | Performed by: INTERNAL MEDICINE

## 2023-12-01 PROCEDURE — 3077F SYST BP >= 140 MM HG: CPT | Mod: HCNC,CPTII,S$GLB, | Performed by: INTERNAL MEDICINE

## 2023-12-01 PROCEDURE — 94726 PULM FUNCT TST PLETHYSMOGRAP: ICD-10-PCS | Mod: HCNC,S$GLB,, | Performed by: INTERNAL MEDICINE

## 2023-12-01 PROCEDURE — 94010 BREATHING CAPACITY TEST: ICD-10-PCS | Mod: HCNC,S$GLB,, | Performed by: INTERNAL MEDICINE

## 2023-12-01 PROCEDURE — 3008F PR BODY MASS INDEX (BMI) DOCUMENTED: ICD-10-PCS | Mod: HCNC,CPTII,S$GLB, | Performed by: INTERNAL MEDICINE

## 2023-12-01 PROCEDURE — 73502 X-RAY EXAM HIP UNI 2-3 VIEWS: CPT | Mod: TC,HCNC,LT

## 2023-12-01 PROCEDURE — 73502 XR HIP WITH PELVIS WHEN PERFORMED, 2 OR 3 VIEWS LEFT: ICD-10-PCS | Mod: 26,HCNC,LT, | Performed by: RADIOLOGY

## 2023-12-01 PROCEDURE — 1160F RVW MEDS BY RX/DR IN RCRD: CPT | Mod: HCNC,CPTII,S$GLB, | Performed by: NURSE PRACTITIONER

## 2023-12-01 PROCEDURE — 94729 DIFFUSING CAPACITY: CPT | Mod: HCNC,S$GLB,, | Performed by: INTERNAL MEDICINE

## 2023-12-01 PROCEDURE — 1125F AMNT PAIN NOTED PAIN PRSNT: CPT | Mod: HCNC,CPTII,S$GLB, | Performed by: INTERNAL MEDICINE

## 2023-12-01 PROCEDURE — 94010 BREATHING CAPACITY TEST: CPT | Mod: HCNC,S$GLB,, | Performed by: INTERNAL MEDICINE

## 2023-12-01 PROCEDURE — 1159F MED LIST DOCD IN RCRD: CPT | Mod: HCNC,CPTII,S$GLB, | Performed by: INTERNAL MEDICINE

## 2023-12-01 PROCEDURE — 99999 PR PBB SHADOW E&M-EST. PATIENT-LVL III: ICD-10-PCS | Mod: PBBFAC,HCNC,, | Performed by: INTERNAL MEDICINE

## 2023-12-01 PROCEDURE — 1160F RVW MEDS BY RX/DR IN RCRD: CPT | Mod: HCNC,CPTII,S$GLB, | Performed by: INTERNAL MEDICINE

## 2023-12-01 PROCEDURE — 1160F PR REVIEW ALL MEDS BY PRESCRIBER/CLIN PHARMACIST DOCUMENTED: ICD-10-PCS | Mod: HCNC,CPTII,S$GLB, | Performed by: INTERNAL MEDICINE

## 2023-12-01 PROCEDURE — 99204 PR OFFICE/OUTPT VISIT, NEW, LEVL IV, 45-59 MIN: ICD-10-PCS | Mod: HCNC,S$GLB,, | Performed by: INTERNAL MEDICINE

## 2023-12-01 PROCEDURE — 3288F PR FALLS RISK ASSESSMENT DOCUMENTED: ICD-10-PCS | Mod: HCNC,CPTII,S$GLB, | Performed by: INTERNAL MEDICINE

## 2023-12-01 PROCEDURE — 99999 PR PBB SHADOW E&M-EST. PATIENT-LVL III: CPT | Mod: PBBFAC,HCNC,, | Performed by: INTERNAL MEDICINE

## 2023-12-01 RX ORDER — COVID-19 VACCINE, MRNA 0.04 MG/.418ML
INJECTION, SUSPENSION INTRAMUSCULAR
COMMUNITY
Start: 2023-09-30

## 2023-12-01 RX ORDER — HYALURONATE SODIUM 30 MG/2 ML
SYRINGE (ML) INTRAARTICULAR
COMMUNITY
Start: 2023-09-18

## 2023-12-01 RX ORDER — INFLUENZA A VIRUS A/VICTORIA/4897/2022 IVR-238 (H1N1) ANTIGEN (FORMALDEHYDE INACTIVATED), INFLUENZA A VIRUS A/DARWIN/6/2021 IVR-227 (H3N2) ANTIGEN (FORMALDEHYDE INACTIVATED), INFLUENZA B VIRUS B/AUSTRIA/1359417/2021 BVR-26 ANTIGEN (FORMALDEHYDE INACTIVATED), INFLUENZA B VIRUS B/PHUKET/3073/2013 BVR-1B ANTIGEN (FORMALDEHYDE INACTIVATED) 15; 15; 15; 15 UG/.5ML; UG/.5ML; UG/.5ML; UG/.5ML
INJECTION, SUSPENSION INTRAMUSCULAR
COMMUNITY
Start: 2023-09-30

## 2023-12-01 RX ORDER — IRON SUCROSE 20 MG/ML
INJECTION, SOLUTION INTRAVENOUS
COMMUNITY
Start: 2023-10-06

## 2023-12-01 NOTE — PROGRESS NOTES
Ms. Floyd is here today for a follow up visit after undergoing an Lt IDA by Dr. Wan on 5/28/2023.    Interval History:  She reports that she is doing well.  She reports no pain in her left hip.  She is not taking pain medication.  She was supposed to have shoulder replacement surgery but elected not to proceed with the surgery.  She was then diagnosed with COVID a proximally 2 weeks ago.  She is now COVID negative.  She denies any falls or injuries since last seen in clinic.  She denies fever, chills, and sweats since the time of the surgery.     Physical exam:  Incision is open air and healed.  No signs of infection seen.    She has tactile stimulation to their lower leg, she denies calf pain, there is no leg edema and their pedal pulse is palpable x 2.  Range of motion of the hip is 0-100 degrees.  Patient walks with a antalgic gait with in the clinic room using a Rolator.      RADS: Left hip xray was obtained and personally reviewed by me, findings show left IDA appears to be in good position.  No evidence of hardware failure or new fractures seen.    Assessment:  6 month follow up    Plan:    ICD-10-CM ICD-9-CM   1. Left displaced femoral neck fracture s/p IDA 5/28/23  S72.002A 820.8     Current care, treatment plan, precautions, activity level/ modifications, limitations, rehabilitation exercises and proposed future treatment were discussed with the patient. We discussed the need to monitor for changes in symptoms and condition and report them to the physician.  Discussed importance of compliance with all appointments and follow up examinations.       PHYSICAL THERAPY:   - HEP.  - Weight bearing as tolerated.    - Range of motion as tolerates.       FOLLOW UP:   - Patient will follow up in the clinic in 6 months.  - X-ray of her Left hip is needed.    - Future Appointments:   Future Appointments   Date Time Provider Department Center   12/1/2023 10:30 AM Hina Roberts, DO [facility] LUNGTX [place] [facility]    12/12/2023  9:30 AM AUDIOGRAM, AUDIO [facility] AUDIO [facility]   12/12/2023 10:00 AM Shiela Sin NP [facility] ENT [facility]   12/15/2023  7:00 AM LAB, SAME DAY [facility] LABDRAW [facility]   12/18/2023 11:20 AM Anh Martinez MD [facility] HEM ONC [facility]   12/18/2023  1:00 PM Kvng Abrams MD [facility] PULMSVC [facility]   12/27/2023 10:30 AM Juan Naranjo MD [facility] RMTLGY [place]   12/29/2023  2:00 PM Carmela Ace NP [facility] PSYCH [facility]   2/26/2024 10:30 AM Armani Cai PA-C [facility] SPMEDPC [place]   3/5/2024  9:00 AM Michael Lal MD [facility] [id number] [facility]   6/17/2024  9:00 AM [facility] DEXA1 [facility] BMD [facility]       If there are any questions prior to scheduled follow up, the patient was instructed to contact the office

## 2023-12-01 NOTE — TELEPHONE ENCOUNTER
Refill Encounter    PCP Visits: Recent Visits  Date Type Provider Dept   11/27/23 Office Visit Effie Olmedo MD [facility]   03/28/23 Office Visit Effie Olmedo MD [facility]   03/14/23 Office Visit Effie Olmedo MD [facility]   Showing recent visits within past 360 days and meeting all other requirements  Future Appointments  No visits were found meeting these conditions.  Showing future appointments within next 720 days and meeting all other requirements     Last 3 Blood Pressure:   BP Readings from Last 3 Encounters:   12/01/23 (!) 155/83   11/29/23 138/82   11/27/23 132/80     Preferred Pharmacy:   [facility]/pharmacy #[id number] - [place], [place] - [address]  [address] [address]  Phone: [phone] Fax: [phone]    [facility] - [place], [place] - [address]  [address] [address]  Phone: [phone] Fax: [phone]    Requested RX:  Requested Prescriptions     Pending Prescriptions Disp Refills    benzonatate (TESSALON) 200 MG capsule 21 capsule 0     Sig: Take 1 capsule (200 mg total) by mouth 3 (three) times daily as needed for Cough.      RX Route: Normal

## 2023-12-01 NOTE — PROGRESS NOTES
ADVANCED LUNG DISEASE INITIAL VISIT                                                                                                                                             Reason for Visit:  Dyspnea    Referring Physician: Eric Rodriguez MD    History of Present Illness: Jessica Floyd is a 73 y.o. female who is on 0L of oxygen.  She is on no assisted ventilation.  Her New York Heart Association Class is II and a Karnofsky score of 90% - Able to carry on normal activity: minor symptoms of disease. She is not diabetic.    Pt presents for initial evaluation of dyspnea.  She developed acute dyspnea a few weeks ago.  She was found to have COVID and ended up being admitted to the hospital for remdesivir, dexamethazone, and azithromycin.  She states that since then, she has returned to baseline.  No further issues and is back to her normal activity.  She had a post hospital follow-up with her PCP and was started on breo and spiriva.  She states prior to COVID, she did not have any pulmonary history.  She states that she did have occasional exertional dyspnea but otherwise felt fine.  She states that she was a previous smoker and quit in 1999.  She was given inhalers recently but was told that she did not have any lung problems.  She remains active and walks daily.  She does not feel limited in anyway by her dyspnea.  No cough or sinus troubles.  No GERD.        Past Medical History:   Diagnosis Date    Allergic rhinitis     Amblyopia     Carotid stenosis     Coronary atherosclerosis     Outside [facility] 6/2014: 20% mLAD, 50% mCx, 20%, mRCA    Dyslipidemia     ESBL (extended spectrum beta-lactamase) producing bacteria infection     UTI    Hypertension     Nausea and vomiting 05/26/2017    Pulmonary emphysema 10/28/2023    SAH (subarachnoid hemorrhage) 08/24/2016 1999, s/p clipping    Subarachnoid hemorrhage due to ruptured aneurysm 1999       Past Surgical History:   Procedure Laterality Date    ADENOIDECTOMY       ANTERIOR CRUCIATE LIGAMENT REPAIR  2012    APPENDECTOMY      CATARACT EXTRACTION W/  INTRAOCULAR LENS IMPLANT Right 11/07/2022    Procedure: EXTRACTION, CATARACT, WITH IOL INSERTION;  Surgeon: Higinio Cristina MD;  Location: [facility];  Service: Ophthalmology;  Laterality: Right;    CATARACT EXTRACTION W/  INTRAOCULAR LENS IMPLANT Left 11/21/2022    Procedure: EXTRACTION, CATARACT, WITH IOL INSERTION;  Surgeon: Higinio Cristina MD;  Location: [facility] OR;  Service: Ophthalmology;  Laterality: Left;    CEREBRAL ANEURYSM REPAIR  1999    [facility]    DENTAL SURGERY      EYE SURGERY Bilateral     cataract removal and lens implants    HIP ARTHROPLASTY Left 05/28/2023    Procedure: TOTAL HIP ARTHROPLASTY;  Surgeon: Juan Wan MD;  Location: [address];  Service: Orthopedics;  Laterality: Left;    TONSILLECTOMY         Allergies: Patient has no known allergies.    Current Outpatient Medications   Medication Sig    acetaminophen (TYLENOL) 650 MG TbSR Take 1 tablet (650 mg total) by mouth every 6 to 8 hours as needed (pain (mild-moderate)).    allopurinoL (ZYLOPRIM) 100 MG tablet Take 2 tablets (200 mg total) by mouth once daily.    amLODIPine (NORVASC) 5 MG tablet TAKE 1 TABLET BY MOUTH EVERY DAY    aspirin (ECOTRIN) 81 MG EC tablet Take 81 mg by mouth once daily.    atorvastatin (LIPITOR) 80 MG tablet TAKE 1 TABLET BY MOUTH EVERY DAY    carvediloL (COREG) 6.25 MG tablet Take 1 tablet (6.25 mg total) by mouth 2 (two) times daily with meals.    celecoxib (CELEBREX) 200 MG capsule Take 1 capsule (200 mg total) by mouth as needed.    colchicine, gout, (COLCRYS) 0.6 mg tablet Take 1 tablet (0.6 mg total) by mouth once daily. As needed for gout    fluticasone furoate-vilanteroL (BREO) 100-25 mcg/dose diskus inhaler Inhale 1 puff into the lungs once daily. Controller    fluticasone propionate (FLONASE) 50 mcg/actuation nasal spray SPRAY 2 pumps INTO EACH NOSTRIL ONCE DAILY    folic acid (FOLVITE) 1 MG tablet Take 1 tablet (1  mg total) by mouth once daily.    hydroCHLOROthiazide (HYDRODIURIL) 25 MG tablet Take 1 tablet (25 mg total) by mouth once daily.    melatonin (MELATIN) 3 mg tablet Take 2 tablets (6 mg total) by mouth nightly as needed for Insomnia.    montelukast (SINGULAIR) 10 mg tablet Take 1 tablet (10 mg total) by mouth every evening.    multivitamin (THERAGRAN) tablet Take 1 tablet by mouth once daily.    pantoprazole (PROTONIX) 40 MG tablet Take 1 tablet (40 mg total) by mouth once daily.    prenatal no122/iron/folic acid (PRENATAL MULTI ORAL) Take 1 tablet by mouth once daily.    QUEtiapine (SEROQUEL) 50 MG tablet Take 1 tablet (50 mg total) by mouth every evening.    tiotropium bromide (SPIRIVA RESPIMAT) 2.5 mcg/actuation inhaler Inhale 2 puffs into the lungs once daily. Controller    COMIRNATY 2023-24, 12Y UP,,PF, 30 mcg/0.3 mL inection     FLUAD QUAD 2023-24,65Y UP,,PF, 60 mcg (15 mcg x 4)/0.5 mL Syrg     ORTHOVISC 30 mg/2 mL     VENOFER 100 mg iron/5 mL injection      No current facility-administered medications for this visit.     Facility-Administered Medications Ordered in Other Visits   Medication    mupirocin 2 % ointment    tranexamic acid (CYKLOKAPRON) 1,000 mg in sodium chloride 0.9 % 100 mL IVPB (MB+)    tranexamic acid (CYKLOKAPRON) 1,000 mg in sodium chloride 0.9 % 100 mL IVPB (MB+)       Immunization History   Administered Date(s) Administered    COVID-19, MRNA, LN-S, PF (Pfizer) (Gray Cap) 04/20/2022    COVID-19, MRNA, LN-S, PF (Pfizer) (Purple Cap) 01/09/2021, 01/30/2021, 08/23/2021    COVID-19, mRNA, LNP-S, PF, astrid-sucrose, 30 mcg/0.3 mL (Pfizer 2023 Ages 12+) 09/30/2023    Influenza - High Dose - PF (65 years and older) 10/03/2016, 11/21/2017, 10/16/2018, 11/12/2019    Influenza - Quadrivalent - High Dose - PF (65 years and older) 10/02/2020, 10/08/2021, 09/24/2022    Pneumococcal Conjugate - 13 Valent 10/03/2016    Pneumococcal Polysaccharide - 23 Valent 11/21/2017    RSVpreF (Arexvy) 12/01/2023     Tdap 2017    Zoster Recombinant 10/08/2021, 2022     Family History:    Family History   Problem Relation Age of Onset    Hypertension Mother     Aneurysm Mother     Hypertension Father     Alcohol abuse Father     Kidney disease Brother     Heart disease Brother     Gout Brother     No Known Problems Daughter     No Known Problems Daughter     No Known Problems Daughter      Social History     Substance and Sexual Activity   Alcohol Use Yes    Alcohol/week: 7.0 standard drinks of alcohol    Types: 7 Glasses of wine per week    Comment: One glass of Red wine prior to dinner      Social History     Substance and Sexual Activity   Drug Use No      Social History     Socioeconomic History    Marital status:    Occupational History    Occupation: Retired   Tobacco Use    Smoking status: Former     Current packs/day: 0.00     Average packs/day: 0.5 packs/day for 20.0 years (10.0 ttl pk-yrs)     Types: Cigarettes     Start date: 1979     Quit date: 1999     Years since quittin.9     Passive exposure: Past    Smokeless tobacco: Never   Substance and Sexual Activity    Alcohol use: Yes     Alcohol/week: 7.0 standard drinks of alcohol     Types: 7 Glasses of wine per week     Comment: One glass of Red wine prior to dinner    Drug use: No    Sexual activity: Yes     Partners: Male   Social History Narrative    .  Has 3 grown daughters.       Social Determinants of Health     Financial Resource Strain: Low Risk  (2023)    Overall Financial Resource Strain (CARDIA)     Difficulty of Paying Living Expenses: Not hard at all   Food Insecurity: No Food Insecurity (2023)    Hunger Vital Sign     Worried About Running Out of Food in the Last Year: Never true     Ran Out of Food in the Last Year: Never true   Transportation Needs: No Transportation Needs (2023)    PRAPARE - Transportation     Lack of Transportation (Medical): No     Lack of Transportation (Non-Medical): No    Physical Activity: Insufficiently Active (11/27/2023)    Exercise Vital Sign     Days of Exercise per Week: 1 day     Minutes of Exercise per Session: 30 min   Stress: No Stress Concern Present (11/27/2023)    [employer] [place] [employer] - Occupational Stress Questionnaire     Feeling of Stress : Not at all   Recent Concern: Stress - Stress Concern Present (10/28/2023)    [employer] [place] [employer] - Occupational Stress Questionnaire     Feeling of Stress : To some extent   Social Connections: Socially Integrated (11/27/2023)    Social Connection and Isolation Panel [NHANES]     Frequency of Communication with Friends and Family: More than three times a week     Frequency of Social Gatherings with Friends and Family: More than three times a week     Attends Mosque Services: More than 4 times per year     Active Member of Clubs or Organizations: Yes     Attends Club or Organization Meetings: 1 to 4 times per year     Marital Status:    Housing Stability: Low Risk  (11/27/2023)    Housing Stability Vital Sign     Unable to Pay for Housing in the Last Year: No     Number of Places Lived in the Last Year: 1     Unstable Housing in the Last Year: No     Review of Systems   Constitutional:  Negative for chills, diaphoresis, fever, malaise/fatigue and weight loss.   HENT:  Negative for congestion, ear discharge, ear pain, hearing loss, nosebleeds, sinus pain, sore throat and tinnitus.    Eyes:  Negative for blurred vision, double vision, photophobia, pain, discharge and redness.   Respiratory:  Negative for cough, hemoptysis, sputum production, shortness of breath, wheezing and stridor.    Cardiovascular:  Negative for chest pain, palpitations, orthopnea, claudication, leg swelling and PND.   Gastrointestinal:  Negative for abdominal pain, blood in stool, constipation, diarrhea, heartburn, melena, nausea and vomiting.   Genitourinary:  Negative for dysuria, flank pain, frequency,  "hematuria and urgency.   Musculoskeletal:  Negative for back pain, falls, joint pain, myalgias and neck pain.   Skin:  Negative for itching and rash.   Neurological:  Negative for dizziness, tingling, tremors, sensory change, speech change, focal weakness, seizures, loss of consciousness, weakness and headaches.   Endo/Heme/Allergies:  Negative for environmental allergies and polydipsia. Does not bruise/bleed easily.   Psychiatric/Behavioral:  Negative for depression, hallucinations, memory loss, substance abuse and suicidal ideas. The patient is not nervous/anxious and does not have insomnia.      Vitals  BP (!) 155/83 (BP Location: Left arm, Patient Position: Sitting, BP Method: Small (Automatic))   Pulse 95   Temp 97.9 °F (36.6 °C) (Oral)   Ht 5' 3" (1.6 m)   Wt 53.8 kg (118 lb 9.7 oz)   SpO2 96% Comment: Room Air  BMI 21.01 kg/m²   Physical Exam  Vitals and nursing note reviewed.   Constitutional:       General: She is not in acute distress.     Appearance: She is well-developed. She is not diaphoretic.   HENT:      Head: Normocephalic and atraumatic.      Nose: Nose normal.      Mouth/Throat:      Pharynx: No oropharyngeal exudate.   Eyes:      General: No scleral icterus.     Conjunctiva/sclera: Conjunctivae normal.      Pupils: Pupils are equal, round, and reactive to light.   Neck:      Thyroid: No thyromegaly.      Vascular: No JVD.      Trachea: No tracheal deviation.   Cardiovascular:      Rate and Rhythm: Normal rate and regular rhythm.      Heart sounds: Normal heart sounds. No murmur heard.     No friction rub. No gallop.   Pulmonary:      Effort: Pulmonary effort is normal. No respiratory distress.      Breath sounds: Decreased air movement (bilateral) present. No wheezing or rales.   Abdominal:      General: Bowel sounds are normal. There is no distension.      Palpations: Abdomen is soft.      Tenderness: There is no abdominal tenderness.   Musculoskeletal:         General: No deformity. " Normal range of motion.      Cervical back: Normal range of motion and neck supple.   Skin:     General: Skin is warm and dry.      Capillary Refill: Capillary refill takes less than 2 seconds.      Coloration: Skin is not pale.      Findings: No erythema or rash.   Neurological:      Mental Status: She is alert and oriented to person, place, and time.      Cranial Nerves: No cranial nerve deficit.   Psychiatric:         Judgment: Judgment normal.         Labs:  Hospital Outpatient Visit on 12/01/2023   Component Date Value    Physician Comment 12/01/2023                      Value:Spirometry shows moderate-severe obstruction. Lung volume determination shows TLC is normal with evidence air trapping is present. Airway mechanics are abnormal showing increased airway resistance and decreased conductance. DLCO is severely decreased.   Â   Notes: DLCO interpretation based on the adjusted DLCO value due to a low hemoglobin.       Pre FVC 12/01/2023 1.95     PRE FEV5 12/01/2023 0.76     Pre FEV1 12/01/2023 1.12 (L)     Pre FEV1 FVC 12/01/2023 57.36 (L)     Pre FEF 25 75 12/01/2023 0.54 (L)     Pre PEF 12/01/2023 2.55 (L)     Pre  12/01/2023 5.38     Pre DLCO 12/01/2023 5.92 (L)     DLCO ADJ PRE 12/01/2023 6.28 (L)     DLCOVA PRE 12/01/2023 1.81 (L)     DLVAAdj PRE 12/01/2023 1.92 (L)     VA PRE 12/01/2023 3.26 (L)     IVC PRE 12/01/2023 1.78 (L)     Pre TLC 12/01/2023 5.13     VC PRE 12/01/2023 1.95     PRE IC 12/01/2023 1.44     Pre FRC PL 12/01/2023 3.69 (H)     Pre ERV 12/01/2023 0.51     Pre RV 12/01/2023 3.18 (H)     RVTLC PRE 12/01/2023 61.94 (H)     Raw PRE 12/01/2023 5.70 (H)     sGaw PRE 12/01/2023 0.04 (L)     FVC Ref 12/01/2023 2.62     FVC LLN 12/01/2023 1.88     FVC Pre Ref 12/01/2023 74.7     FEV05 REF 12/01/2023 1.60     FEV05 LLN 12/01/2023 0.74     PRE FEV05 REF 12/01/2023 47.6     FEV1 Ref 12/01/2023 2.02     FEV1 LLN 12/01/2023 1.44     FEV1 Pre Ref 12/01/2023 55.6     FEV1 FVC Ref 12/01/2023 78      FEV1 FVC LLN 12/01/2023 64     FEV1 FVC Pre Ref 12/01/2023 73.8     FEF 25 75 Ref 12/01/2023 1.69     FEF 25 75 LLN 12/01/2023 0.75     FEF 25 75 Pre Ref 12/01/2023 31.9     PEF Ref 12/01/2023 5.18     PEF LLN 12/01/2023 3.52     PEF Pre Ref 12/01/2023 49.2     TLC Ref 12/01/2023 4.77     TLC LLN 12/01/2023 3.78     TLC ULN 12/01/2023 5.76     TLC Pre Ref 12/01/2023 107.6     VC Ref 12/01/2023 2.62     VC LLN 12/01/2023 1.88     VC ULN 12/01/2023 3.40     VC Pre Ref 12/01/2023 74.7     REF IC 12/01/2023 1.82     LLN IC 12/01/2023 -28112.18     ULN IC 12/01/2023 78664.82     PRE REF IC 12/01/2023 79.0     FRCpleth Ref 12/01/2023 2.66     FRCpleth LLN 12/01/2023 1.84     FRC PLETH ULN 12/01/2023 3.48     FRCpleth PreRef 12/01/2023 138.9     ERV Ref 12/01/2023 0.58     ERV LLN 12/01/2023 -95647.42     ERV ULN 12/01/2023 90476.58     ERV Pre Ref 12/01/2023 88.8     RV Ref 12/01/2023 2.08     RV LLN 12/01/2023 1.50     RV ULN 12/01/2023 2.66     RV Pre Ref 12/01/2023 152.9     RVTLC Ref 12/01/2023 44     RVTLC LLN 12/01/2023 35     RV TLC ULN 12/01/2023 54     RVTLC Pre Ref 12/01/2023 140.4     Raw Ref 12/01/2023 3.06     Raw Pre Ref 12/01/2023 186.2     sGaw Ref 12/01/2023 0.10     sGaw Pre Ref 12/01/2023 42.0     DLCO Single Breath Ref 12/01/2023 20.08     DLCO Single Breath LLN 12/01/2023 14.34     DLCO SINGLEBREATH ULN 12/01/2023 25.81     DLCO Single Breath Pre R* 12/01/2023 29.5     DLCOc Single Breath Ref 12/01/2023 20.08     DLCOc Single Breath LLN 12/01/2023 14.34     DLCOC SINGLEBREATH ULN 12/01/2023 25.81     DLCOc Single Breath Pre * 12/01/2023 31.3     DLCOVA Ref 12/01/2023 4.21     DLCOVA LLN 12/01/2023 2.72     DLCOVA ULN 12/01/2023 5.69     DLCOVA Pre Ref 12/01/2023 43.1     DLCOc SBVA Ref 12/01/2023 4.21     DLCOc SBVA LLN 12/01/2023 2.72     DLCOCSBVA ULN 12/01/2023 5.69     DLCOc SBVA Pre Ref 12/01/2023 45.7     VA Single Breath Ref 12/01/2023 4.62     VA Single Breath LLN 12/01/2023 4.62     VA  SINGLEBREATH ULN 12/01/2023 4.62     VA Single Breath Pre Ref 12/01/2023 70.6     IVC Single Breath Ref 12/01/2023 2.62     IVC Single Breath LLN 12/01/2023 1.88     IVC SINGLEBREATH ULN 12/01/2023 3.40     IVC Single Breath Pre Ref 12/01/2023 68.1            12/1/2023     8:58 AM   Pulmonary Function Tests   FVC 1.95 liters   FEV1 1.12 liters   TLC (liters) 5.13 liters   DLCO (ml/mmHg sec) 5.92 ml/mmHg sec   FVC% 74   FEV1% 55   FEF 25-75 0.54   FEF 25-75% 31   TLC% 107   RV 3.18   RV% 152   DLCO% 29         12/1/2023     9:05 AM   6MW   6MWT Status completed with stops   Patient Reported Dyspnea   Was O2 used? No   6MW Distance walked (feet) 650 feet   Distance walked (meters) 198.12 meters   Did patient stop? Yes   Oxygen Saturation 96 %   Supplemental Oxygen Room Air   Heart Rate 87 bpm   Blood Pressure 138/72   Jessica Dyspnea Rating  nothing at all   Oxygen Saturation 94 %   Supplemental Oxygen Room Air   Heart Rate 92 bpm   Blood Pressure 136/81   Jessica Dyspnea Rating  moderate   Recovery Time (seconds) 101 seconds   Oxygen Saturation 97 %   Supplemental Oxygen Room Air   Heart Rate 79 bpm       Imaging:  Results for orders placed in visit on 09/23/18    XR CHEST PA AND LATERAL    Narrative  EXAMINATION:  XR CHEST PA AND LATERAL    CLINICAL HISTORY:  Cough    TECHNIQUE:  PA and lateral views of the chest were performed.    COMPARISON:  CT 05/25/2017, radiograph 05/25/2017    FINDINGS:  The cardiomediastinal silhouette is not enlarged, noting calcification of the aortic arch..  There is no pleural effusion.  The trachea is midline.  The lungs are symmetrically expanded bilaterally with mildly coarse interstitial attenuation, and mild bilateral basilar subsegmental atelectasis..  No large focal consolidation seen.  There is no pneumothorax.  The osseous structures are remarkable for degenerative changes..    IMPRESSION:    1. No acute cardiopulmonary process.      Electronically signed by: Armani Yuan  "MD  Date:    09/23/2018  Time:    12:50    Results for orders placed during the hospital encounter of 07/26/23        US Carotid Bilateral    Narrative  EXAMINATION:  US CAROTID BILATERAL    CLINICAL HISTORY:  check patency of known carotid stent given syncope;    TECHNIQUE:  Grayscale and color spectral Doppler ultrasound imaging of the carotid and vertebral artery systems bilaterally.    COMPARISON:  None    FINDINGS:  Patient with reported right common carotid artery stent.    Right:    Internal Carotid Artery (ICA) peak systolic velocity 22 cm/sec    Internal Carotid Artery (ICA) end-diastolic velocity 7 cm/sec    Common Carotid Artery (CCA) peak systolic velocity 30 cm/sec    Common Carotid Artery (CCA) end-diastolic velocity 8 cm/sec    ICA/CCA peak systolic velocity ratio: 0.7    ICA/CCA end-diastolic velocity ratio: 0.8    Plaque formation: Homogeneous    Vertebral artery: Antegrade flow and normal waveform.    Left:    Internal Carotid Artery (ICA)  peak systolic velocity 43 cm/sec    Internal Carotid Artery (ICA) end-diastolic velocity 15 cm/sec    Common Carotid Artery (CCA) peak systolic velocity 47 cm/sec    Common Carotid Artery (CCA) end-diastolic velocity 11 cm/sec    ICA/CCA peak systolic velocity ratio: 0.9    ICA/CCA end diastolic velocity ratio: 1.4    Plaque formation: Homogeneous    Vertebral artery: Antegrade flow and normal waveform.    Impression  1. Right common carotid artery stent patent.  2. 1 - 39% stenosis of the right internal carotid artery.  3. 1 - 39% stenosis of the left internal carotid artery.    Electronically signed by resident: Keo Jang  Date:    10/29/2023  Time:    08:45    Electronically signed by: Danis Guerrero Jr  Date:    10/29/2023  Time:    08:50    Results for orders placed during the hospital encounter of 10/28/23    CT Chest Without Contrast    Narrative  EXAMINATION:  CT CHEST WITHOUT CONTRAST    CLINICAL HISTORY:  "Dyspnea, chronic, unclear " "etiology;"    COMPARISON:  10/28/2023.    TECHNIQUE:  Volumetric data acquisition of the chest from the lung apices to the adrenals was obtained without intravenous contrast. Sagittal and coronal multiplanar reconstructions were performed. Lack of IV contrast material limits the assessment of mediastinal and abdominal structures.    FINDINGS:  The airways are patent.    The thyroid gland is homogeneous, normal in size.    No mediastinal, hilar or subcarinal adenopathy.    The thoracic aorta is of normal caliber and demonstrates moderate atherosclerotic plaque.    The cardiac silhouette appears within normal limits in size, no pericardial effusion.  Coronary artery calcifications seen.  Stent in the right common carotid artery.    The esophagus appears normal in course and caliber.    Three lobular and paraseptal emphysema.    New cluster of micro nodules within the right lower lobe, 302:362.  Small Bochdalek hernia.    The left lung is well aerated.    No pleural effusion.    The axillary regions appear normal.    The upper abdominal organs demonstrate a left hepatic lobe low attenuating lesion 2.3 cm.    The osseous structures straight mild loss of height at T11-T12 L1, unchanged.  No new fracture.  Moderate degenerative change of the right shoulder joint.    Impression  Small, new cluster of micro nodules, right lower lobe, most often seen in the setting of infection, follow-up recommended after treatment completed to ensure resolution.    Emphysema.    Left hepatic lobe hypoattenuating lesion suggestive of a cyst consider non emergent ultrasound evaluation.    See other details above.    This report was flagged in Epic as abnormal.      Electronically signed by: Joan Fernández MD  Date:    10/31/2023  Time:    15:05        Cardiodiagnostics:  Results for orders placed during the hospital encounter of 10/28/23    Echo    Interpretation Summary    Left Ventricle: The left ventricle is normal in size. Normal " wall thickness. Normal wall motion. There is normal systolic function with a visually estimated ejection fraction of 60 - 65%. There is normal diastolic function.    Right Ventricle: Mild right ventricular enlargement. Wall thickness is normal. Right ventricle wall motion  is normal. Systolic function is normal.    Aortic Valve: The aortic valve is a unicuspid valve. There is mild aortic valve sclerosis. There is trace aortic regurgitation.    Mitral Valve: There is mild regurgitation.    Pulmonary Artery: There is mild pulmonary hypertension. The estimated pulmonary artery systolic pressure is 40 mmHg.    IVC/SVC: Intermediate venous pressure at 8 mmHg.        Assessment:  1. Centrilobular emphysema    2. SOB (shortness of breath)    3. Seasonal allergic rhinitis, unspecified trigger      Plan:   Initially sent for evaluation of dyspnea.  She was subsequently diagnosed with COVID and received remdesivir, dexamethasone, and azithro.  She recovered and is back to her baseline.  No supplemental oxygen needs.  Her PFTs today show airflow obstruction and reduced DLCO.  She has no desaturations on 6MWT and ePAP on TTE is 40mmHg.  Her CT chest shows pulmonary emphysema and no evidence of ILD.  She is currently on Breo and Spirva.  No exacerbations or need for ANTON.  We discussed the diagnosis and treatment of COPD at length.  Continue with current therapy.  SOB has resolved and was related to acute COVID infection.  Continue with exercise as tolerated.    Continue with allergy regimen for peak allergy seasons.    Follow-up in 6 months with PFTs.        Hina Roberts D.O.  Pulmonary/Critical Care and Lung Transplantation  [facility]

## 2023-12-04 ENCOUNTER — TELEPHONE (OUTPATIENT)
Dept: PRIMARY CARE CLINIC | Facility: CLINIC | Age: 73
End: 2023-12-04
Payer: MEDICARE

## 2023-12-05 NOTE — PROCEDURES
Jessica Floyd is a 73 y.o.  female patient, who presents for a 6 minute walk test ordered by DO Alejandra.  The diagnosis is Shortness of Breath; COPD/Emphysema.  The patient's BMI is 21 kg/m2.  Predicted distance (lower limit of normal) is 315.54 meters.      Test Results:    The test was completed with stops. The patient stopped 1 time for a total of 45 seconds. The total time walked was 315 seconds. During walking, the patient reported:  Dyspnea. The patient used a walker for assistance during testing.     12/01/2023---------Distance: 198.12 meters (650 feet)     Lap Walk Time O2 Sat % Supplemental Oxygen Heart Rate Blood Pressure Jessica Scale Comment   Pre-exercise  (Resting) 0 0 96 % Room Air 87 bpm 138/72 mmHg 0    During Exercise 1 90 sec 95 % Room Air 93 bpm      During Exercise 2 190 sec 95 % Room Air 99 bpm   Rested for 45 seconds   During Exercise 3 330 sec 96 % Room Air 98 bpm      End of Exercise  360 sec 94 % Room Air 92 bpm 136/81 mmHg 3    Post-exercise  (Recovery)   97 % Room Air  79 bpm        Recovery Time: 101 seconds    Performing nurse/tech: Dipti HERNANDEZ      PREVIOUS STUDY:   The patient has not had a previous study.      CLINICAL INTERPRETATION:  Six minute walk distance is 198.12 meters (650 feet) with moderate dyspnea.  During exercise, there was no significant desaturation while breathing room air.  Both blood pressure and heart rate remained stable with walking.  The patient did not report non-pulmonary symptoms during exercise.  Significant exercise impairment is likely due to subjective symptoms.  The patient did complete the study, walking 315 seconds of the 360 second test.  No previous study performed.  Based upon age and body mass index, exercise capacity is less than predicted.

## 2023-12-06 ENCOUNTER — TELEPHONE (OUTPATIENT)
Dept: PRIMARY CARE CLINIC | Facility: CLINIC | Age: 73
End: 2023-12-06
Payer: MEDICARE

## 2023-12-06 DIAGNOSIS — F41.9 ANXIETY: Primary | ICD-10-CM

## 2023-12-06 RX ORDER — BENZONATATE 200 MG/1
200 CAPSULE ORAL 3 TIMES DAILY PRN
Qty: 21 CAPSULE | Refills: 0 | Status: SHIPPED | OUTPATIENT
Start: 2023-12-06 | End: 2023-12-13

## 2023-12-06 NOTE — TELEPHONE ENCOUNTER
Pt state that the referral has to be sent to [facility] to be approved. After approval the pt would like for the referral to be sent to a referral [facility].

## 2023-12-08 ENCOUNTER — DOCUMENT SCAN (OUTPATIENT)
Dept: HOME HEALTH SERVICES | Facility: HOSPITAL | Age: 73
End: 2023-12-08
Payer: MEDICARE

## 2023-12-11 ENCOUNTER — TELEPHONE (OUTPATIENT)
Dept: HEMATOLOGY/ONCOLOGY | Facility: CLINIC | Age: 73
End: 2023-12-11
Payer: MEDICARE

## 2023-12-11 NOTE — TELEPHONE ENCOUNTER
Called Ms Floyd, no answer. Message left on voice mail. Informed Ms Floyd, her appointment schedule on Dec 18 th with Dr Martinez will need to be rescheduled. Dr Martinez will be away from the clinic on the 18 th. Request Ms Floyd return my call to discuss rescheduling her appointment

## 2023-12-12 ENCOUNTER — CLINICAL SUPPORT (OUTPATIENT)
Dept: AUDIOLOGY | Facility: CLINIC | Age: 73
End: 2023-12-12
Payer: MEDICARE

## 2023-12-12 ENCOUNTER — OFFICE VISIT (OUTPATIENT)
Dept: OTOLARYNGOLOGY | Facility: CLINIC | Age: 73
End: 2023-12-12
Payer: MEDICARE

## 2023-12-12 DIAGNOSIS — H90.3 SENSORINEURAL HEARING LOSS, BILATERAL: Primary | ICD-10-CM

## 2023-12-12 DIAGNOSIS — J32.0 CHRONIC MAXILLARY SINUSITIS: Primary | ICD-10-CM

## 2023-12-12 DIAGNOSIS — R42 DIZZINESS AND GIDDINESS: ICD-10-CM

## 2023-12-12 DIAGNOSIS — H90.3 SENSORINEURAL HEARING LOSS (SNHL), BILATERAL: ICD-10-CM

## 2023-12-12 DIAGNOSIS — Z86.79 HX OF SUBARACHNOID HEMORRHAGE: ICD-10-CM

## 2023-12-12 PROCEDURE — 99213 PR OFFICE/OUTPT VISIT, EST, LEVL III, 20-29 MIN: ICD-10-PCS | Mod: HCNC,S$GLB,,

## 2023-12-12 PROCEDURE — 99213 OFFICE O/P EST LOW 20 MIN: CPT | Mod: HCNC,S$GLB,,

## 2023-12-12 PROCEDURE — 1159F MED LIST DOCD IN RCRD: CPT | Mod: HCNC,CPTII,S$GLB,

## 2023-12-12 PROCEDURE — 92557 COMPREHENSIVE HEARING TEST: CPT | Mod: HCNC,S$GLB,,

## 2023-12-12 PROCEDURE — 1159F PR MEDICATION LIST DOCUMENTED IN MEDICAL RECORD: ICD-10-PCS | Mod: HCNC,CPTII,S$GLB,

## 2023-12-12 PROCEDURE — 3044F PR MOST RECENT HEMOGLOBIN A1C LEVEL <7.0%: ICD-10-PCS | Mod: HCNC,CPTII,S$GLB,

## 2023-12-12 PROCEDURE — 1160F RVW MEDS BY RX/DR IN RCRD: CPT | Mod: HCNC,CPTII,S$GLB,

## 2023-12-12 PROCEDURE — 92567 PR TYMPA2METRY: ICD-10-PCS | Mod: HCNC,S$GLB,,

## 2023-12-12 PROCEDURE — 1111F PR DISCHARGE MEDS RECONCILED W/ CURRENT OUTPATIENT MED LIST: ICD-10-PCS | Mod: HCNC,CPTII,S$GLB,

## 2023-12-12 PROCEDURE — 1160F PR REVIEW ALL MEDS BY PRESCRIBER/CLIN PHARMACIST DOCUMENTED: ICD-10-PCS | Mod: HCNC,CPTII,S$GLB,

## 2023-12-12 PROCEDURE — 92557 PR COMPREHENSIVE HEARING TEST: ICD-10-PCS | Mod: HCNC,S$GLB,,

## 2023-12-12 PROCEDURE — 99999 PR PBB SHADOW E&M-EST. PATIENT-LVL III: CPT | Mod: PBBFAC,HCNC,,

## 2023-12-12 PROCEDURE — 92567 TYMPANOMETRY: CPT | Mod: HCNC,S$GLB,,

## 2023-12-12 PROCEDURE — 1111F DSCHRG MED/CURRENT MED MERGE: CPT | Mod: HCNC,CPTII,S$GLB,

## 2023-12-12 PROCEDURE — 3044F HG A1C LEVEL LT 7.0%: CPT | Mod: HCNC,CPTII,S$GLB,

## 2023-12-12 PROCEDURE — 99999 PR PBB SHADOW E&M-EST. PATIENT-LVL III: ICD-10-PCS | Mod: PBBFAC,HCNC,,

## 2023-12-12 NOTE — PROGRESS NOTES
Jessica Floyd was seen today in the clinic for an audiologic evaluation.  Patient's main complaint was dizziness.  Mrs. Floyd reported sinus pressure, fluid, and fullness. She experiences vertigo as well which she attributed to the fluid issues she experiences.     Tympanometry revealed Type A in the right ear and Type A in the left ear.     Audiogram results revealed mild to moderate sensorineural hearing loss (SNHL) in the right ear and mild SNHL from 250-6000 dropping to moderately severe at 8000 Hz in the left ear.      Speech reception thresholds were noted at 10 dB in the right ear and 10 dB in the left ear.    Speech discrimination scores were 100% in the right ear and 96% in the left ear.    Recommendations:  Otologic evaluation  Annual audiogram  Hearing protection when in noise

## 2023-12-12 NOTE — PROGRESS NOTES
"Subjective:       Patient ID: Jessica Floyd is a 73 y.o. female.    Chief Complaint: No chief complaint on file.    HPI  Ms. Floyd is a 73 year old female here today with complaints of dizziness. Reports having stressed induced syncope. Denies any room spinning sensation or head movement triggered dizziness. Has nausea and headaches . Denies any tinnitus, otalgia, ear pain. Feels better when she lays down. Reports having a perforated eardrum 40 years ago and "low tone hearing loss".  Concerned sinus congestion could be causing her dizziness. Hx of chronic maxillary sinusitis and nasal polyps, allergic rhinitis  Saw Dr. Eason in 04/12/2023. Denies any discolored mucopurulence. Using Mucinex, rinses, Flonase and vaporizer every so often. Hx of nose bleeds. Using rolling walker. Going to PT. Also has a hx of SAH with clipping and left eye reduced vision.   Past Medical History:   Diagnosis Date    Allergic rhinitis     Amblyopia     Carotid stenosis     Coronary atherosclerosis     Outside [facility] 6/2014: 20% mLAD, 50% mCx, 20%, mRCA    Dyslipidemia     ESBL (extended spectrum beta-lactamase) producing bacteria infection     UTI    Hypertension     Nausea and vomiting 05/26/2017    Pulmonary emphysema 10/28/2023    SAH (subarachnoid hemorrhage) 08/24/2016 1999, s/p clipping    Subarachnoid hemorrhage due to ruptured aneurysm 1999     Past Surgical History:   Procedure Laterality Date    ADENOIDECTOMY      ANTERIOR CRUCIATE LIGAMENT REPAIR  2012    APPENDECTOMY      CATARACT EXTRACTION W/  INTRAOCULAR LENS IMPLANT Right 11/07/2022    Procedure: EXTRACTION, CATARACT, WITH IOL INSERTION;  Surgeon: Higinio Cristina MD;  Location: [facility] OR;  Service: Ophthalmology;  Laterality: Right;    CATARACT EXTRACTION W/  INTRAOCULAR LENS IMPLANT Left 11/21/2022    Procedure: EXTRACTION, CATARACT, WITH IOL INSERTION;  Surgeon: Higinio Cristina MD;  Location: [facility] OR;  Service: Ophthalmology;  Laterality: Left;    CEREBRAL ANEURYSM REPAIR  1999 "    clip    DENTAL SURGERY      EYE SURGERY Bilateral     cataract removal and lens implants    HIP ARTHROPLASTY Left 2023    Procedure: TOTAL HIP ARTHROPLASTY;  Surgeon: Juan Wan MD;  Location: [facility] OR [address] [place] [address];  Service: Orthopedics;  Laterality: Left;    TONSILLECTOMY       Family History   Problem Relation Age of Onset    Hypertension Mother     Aneurysm Mother     Hypertension Father     Alcohol abuse Father     Kidney disease Brother     Heart disease Brother     Gout Brother     No Known Problems Daughter     No Known Problems Daughter     No Known Problems Daughter      Social History  Social History     Tobacco Use    Smoking status: Former     Current packs/day: 0.00     Average packs/day: 0.5 packs/day for 20.0 years (10.0 ttl pk-yrs)     Types: Cigarettes     Start date: 1979     Quit date: 1999     Years since quittin.9     Passive exposure: Past    Smokeless tobacco: Never   Substance Use Topics    Alcohol use: Yes     Alcohol/week: 7.0 standard drinks of alcohol     Types: 7 Glasses of wine per week     Comment: One glass of Red wine prior to dinner    Drug use: No       Review of Systems   HENT: Positive for sinus infection, sinus pressure and stuffy nose.  Negative for ear discharge, ear infection, postnasal drip, ringing in the ears and sore throat.      Neurological: Positive for dizziness.               Objective:      Physical Exam  Vitals reviewed.   Constitutional:       General: She is not in acute distress.     Appearance: Normal appearance. She is not ill-appearing.   HENT:      Head: Normocephalic and atraumatic.      Right Ear: Hearing, tympanic membrane, ear canal and external ear normal. No decreased hearing noted. No laceration, drainage, swelling or tenderness. No middle ear effusion. There is no impacted cerumen. No foreign body. No mastoid tenderness. No PE tube. No hemotympanum. Tympanic membrane is not injected, scarred, perforated, erythematous,  retracted or bulging. Tympanic membrane has normal mobility.      Left Ear: Hearing, tympanic membrane, ear canal and external ear normal. No decreased hearing noted. No laceration, drainage, swelling or tenderness.  No middle ear effusion. There is no impacted cerumen. No foreign body. No mastoid tenderness. No PE tube. No hemotympanum. Tympanic membrane is not injected, scarred, perforated, erythematous, retracted or bulging. Tympanic membrane has normal mobility.      Nose: No nasal deformity or signs of injury.      Mouth/Throat:      Lips: Pink.   Pulmonary:      Effort: Pulmonary effort is normal. No respiratory distress.   Neurological:      Mental Status: She is alert and oriented to person, place, and time.   Psychiatric:         Attention and Perception: Attention normal.         Mood and Affect: Mood normal.         Speech: Speech normal.         Behavior: Behavior normal. Behavior is cooperative.           Romberg: Unable to perform     Head thrust: normal no saccade    EOM: saccade            Audiogram tracings independently reviewed and discussed with patient   ympanometry revealed Type A in the right ear and Type A in the left ear.    Audiogram results revealed mild to moderate sensorineural hearing loss (SNHL) in the right ear and mild SNHL from 250-6000 dropping to moderately severe at 8000 Hz in the left ear  Assessment:       1. Chronic maxillary sinusitis    2. Sensorineural hearing loss (SNHL), bilateral    3. Dizziness and giddiness        Plan:       Diagnoses and all orders for this visit:    Chronic maxillary sinusitis  I recommended nasal saline irrigation and Flonase.   Continue Singulair for management of polyps per Dr. Eason.  FU with Dr. Eason in 2-3 months. Possible CT scan of sinuses or repeat nasal endo.  Sensorineural hearing loss (SNHL), bilateral  Audiometric testing interpretation consistent with sensorineural hearing loss.  Discussed the etiology of SNHL. Medically cleared for  hearing amplification, and will follow-up with Audiology if interested. Hearing conservation in noisy environments. Return to clinic every year for audiometric testing.   Dizziness and giddiness  Continue PT  Recommended she continue to FU with neurology and cardiology.   Possible VNG .         Questions answered.

## 2023-12-14 ENCOUNTER — TELEPHONE (OUTPATIENT)
Dept: PRIMARY CARE CLINIC | Facility: CLINIC | Age: 73
End: 2023-12-14
Payer: MEDICARE

## 2023-12-14 NOTE — TELEPHONE ENCOUNTER
----- Message from Amberjocelyn Cardozory sent at 12/14/2023  1:48 PM CST -----  Regarding: pharmacy auth  Name of Who is Calling: Ronald with [facility]        What is the request in detail: has clinic questions that need to be answered,for a prior auth, please advise.         Can the clinic reply by [facility]: no           [facility]: [phone] FAX: [phone]

## 2023-12-14 NOTE — TELEPHONE ENCOUNTER
Addended by: RHIANNA POSEY on: 12/28/2020 01:55 PM     Modules accepted: Orders     Pt needs a PA for fluticasone furoate - vilanteroL ( BREO) , pt PA requested will  in 10 hours. Staff informed Ronald from [facility] that  is currently out of office until 24 this message will be forwarded over to the covering provider

## 2023-12-15 ENCOUNTER — LAB VISIT (OUTPATIENT)
Dept: LAB | Facility: OTHER | Age: 73
End: 2023-12-15
Attending: STUDENT IN AN ORGANIZED HEALTH CARE EDUCATION/TRAINING PROGRAM
Payer: MEDICARE

## 2023-12-15 DIAGNOSIS — D50.8 OTHER IRON DEFICIENCY ANEMIA: ICD-10-CM

## 2023-12-15 LAB
BASOPHILS # BLD AUTO: 0.21 K/UL (ref 0–0.2)
BASOPHILS NFR BLD: 1.7 % (ref 0–1.9)
DIFFERENTIAL METHOD: ABNORMAL
EOSINOPHIL # BLD AUTO: 0.8 K/UL (ref 0–0.5)
EOSINOPHIL NFR BLD: 6.6 % (ref 0–8)
ERYTHROCYTE [DISTWIDTH] IN BLOOD BY AUTOMATED COUNT: 18.6 % (ref 11.5–14.5)
FERRITIN SERPL-MCNC: 508 NG/ML (ref 20–300)
HCT VFR BLD AUTO: 33.4 % (ref 37–48.5)
HGB BLD-MCNC: 10.7 G/DL (ref 12–16)
IMM GRANULOCYTES # BLD AUTO: 0.26 K/UL (ref 0–0.04)
IMM GRANULOCYTES NFR BLD AUTO: 2.1 % (ref 0–0.5)
IRON SERPL-MCNC: 49 UG/DL (ref 30–160)
LYMPHOCYTES # BLD AUTO: 2 K/UL (ref 1–4.8)
LYMPHOCYTES NFR BLD: 16.1 % (ref 18–48)
MCH RBC QN AUTO: 34.7 PG (ref 27–31)
MCHC RBC AUTO-ENTMCNC: 32 G/DL (ref 32–36)
MCV RBC AUTO: 108 FL (ref 82–98)
MONOCYTES # BLD AUTO: 2 K/UL (ref 0.3–1)
MONOCYTES NFR BLD: 16 % (ref 4–15)
NEUTROPHILS # BLD AUTO: 7.2 K/UL (ref 1.8–7.7)
NEUTROPHILS NFR BLD: 57.5 % (ref 38–73)
NRBC BLD-RTO: 0 /100 WBC
PLATELET # BLD AUTO: 330 K/UL (ref 150–450)
PMV BLD AUTO: 10.2 FL (ref 9.2–12.9)
RBC # BLD AUTO: 3.08 M/UL (ref 4–5.4)
SATURATED IRON: 18 % (ref 20–50)
TOTAL IRON BINDING CAPACITY: 280 UG/DL (ref 250–450)
TRANSFERRIN SERPL-MCNC: 189 MG/DL (ref 200–375)
WBC # BLD AUTO: 12.46 K/UL (ref 3.9–12.7)

## 2023-12-15 PROCEDURE — 36415 COLL VENOUS BLD VENIPUNCTURE: CPT | Mod: HCNC | Performed by: STUDENT IN AN ORGANIZED HEALTH CARE EDUCATION/TRAINING PROGRAM

## 2023-12-15 PROCEDURE — 83540 ASSAY OF IRON: CPT | Mod: HCNC | Performed by: STUDENT IN AN ORGANIZED HEALTH CARE EDUCATION/TRAINING PROGRAM

## 2023-12-15 PROCEDURE — 84466 ASSAY OF TRANSFERRIN: CPT | Mod: HCNC | Performed by: STUDENT IN AN ORGANIZED HEALTH CARE EDUCATION/TRAINING PROGRAM

## 2023-12-15 PROCEDURE — 85025 COMPLETE CBC W/AUTO DIFF WBC: CPT | Mod: HCNC | Performed by: STUDENT IN AN ORGANIZED HEALTH CARE EDUCATION/TRAINING PROGRAM

## 2023-12-15 PROCEDURE — 82728 ASSAY OF FERRITIN: CPT | Mod: HCNC | Performed by: STUDENT IN AN ORGANIZED HEALTH CARE EDUCATION/TRAINING PROGRAM

## 2023-12-19 ENCOUNTER — PATIENT MESSAGE (OUTPATIENT)
Dept: SPORTS MEDICINE | Facility: CLINIC | Age: 73
End: 2023-12-19
Payer: MEDICARE

## 2023-12-19 ENCOUNTER — OFFICE VISIT (OUTPATIENT)
Dept: HEMATOLOGY/ONCOLOGY | Facility: CLINIC | Age: 73
End: 2023-12-19
Payer: MEDICARE

## 2023-12-19 VITALS
OXYGEN SATURATION: 96 % | BODY MASS INDEX: 21.21 KG/M2 | HEART RATE: 83 BPM | TEMPERATURE: 98 F | WEIGHT: 119.69 LBS | DIASTOLIC BLOOD PRESSURE: 79 MMHG | SYSTOLIC BLOOD PRESSURE: 159 MMHG | RESPIRATION RATE: 19 BRPM | HEIGHT: 63 IN

## 2023-12-19 DIAGNOSIS — D64.9 CHRONIC ANEMIA: Primary | ICD-10-CM

## 2023-12-19 DIAGNOSIS — N18.31 STAGE 3A CHRONIC KIDNEY DISEASE: ICD-10-CM

## 2023-12-19 DIAGNOSIS — D50.8 OTHER IRON DEFICIENCY ANEMIA: ICD-10-CM

## 2023-12-19 PROCEDURE — 1111F DSCHRG MED/CURRENT MED MERGE: CPT | Mod: HCNC,CPTII,S$GLB, | Performed by: STUDENT IN AN ORGANIZED HEALTH CARE EDUCATION/TRAINING PROGRAM

## 2023-12-19 PROCEDURE — 3008F BODY MASS INDEX DOCD: CPT | Mod: HCNC,CPTII,S$GLB, | Performed by: STUDENT IN AN ORGANIZED HEALTH CARE EDUCATION/TRAINING PROGRAM

## 2023-12-19 PROCEDURE — 1125F AMNT PAIN NOTED PAIN PRSNT: CPT | Mod: HCNC,CPTII,S$GLB, | Performed by: STUDENT IN AN ORGANIZED HEALTH CARE EDUCATION/TRAINING PROGRAM

## 2023-12-19 PROCEDURE — 1101F PT FALLS ASSESS-DOCD LE1/YR: CPT | Mod: HCNC,CPTII,S$GLB, | Performed by: STUDENT IN AN ORGANIZED HEALTH CARE EDUCATION/TRAINING PROGRAM

## 2023-12-19 PROCEDURE — 3288F FALL RISK ASSESSMENT DOCD: CPT | Mod: HCNC,CPTII,S$GLB, | Performed by: STUDENT IN AN ORGANIZED HEALTH CARE EDUCATION/TRAINING PROGRAM

## 2023-12-19 PROCEDURE — 99999 PR PBB SHADOW E&M-EST. PATIENT-LVL V: CPT | Mod: PBBFAC,HCNC,, | Performed by: STUDENT IN AN ORGANIZED HEALTH CARE EDUCATION/TRAINING PROGRAM

## 2023-12-19 PROCEDURE — 3288F PR FALLS RISK ASSESSMENT DOCUMENTED: ICD-10-PCS | Mod: HCNC,CPTII,S$GLB, | Performed by: STUDENT IN AN ORGANIZED HEALTH CARE EDUCATION/TRAINING PROGRAM

## 2023-12-19 PROCEDURE — 1125F PR PAIN SEVERITY QUANTIFIED, PAIN PRESENT: ICD-10-PCS | Mod: HCNC,CPTII,S$GLB, | Performed by: STUDENT IN AN ORGANIZED HEALTH CARE EDUCATION/TRAINING PROGRAM

## 2023-12-19 PROCEDURE — 1111F PR DISCHARGE MEDS RECONCILED W/ CURRENT OUTPATIENT MED LIST: ICD-10-PCS | Mod: HCNC,CPTII,S$GLB, | Performed by: STUDENT IN AN ORGANIZED HEALTH CARE EDUCATION/TRAINING PROGRAM

## 2023-12-19 PROCEDURE — 3008F PR BODY MASS INDEX (BMI) DOCUMENTED: ICD-10-PCS | Mod: HCNC,CPTII,S$GLB, | Performed by: STUDENT IN AN ORGANIZED HEALTH CARE EDUCATION/TRAINING PROGRAM

## 2023-12-19 PROCEDURE — 1159F PR MEDICATION LIST DOCUMENTED IN MEDICAL RECORD: ICD-10-PCS | Mod: HCNC,CPTII,S$GLB, | Performed by: STUDENT IN AN ORGANIZED HEALTH CARE EDUCATION/TRAINING PROGRAM

## 2023-12-19 PROCEDURE — 1101F PR PT FALLS ASSESS DOC 0-1 FALLS W/OUT INJ PAST YR: ICD-10-PCS | Mod: HCNC,CPTII,S$GLB, | Performed by: STUDENT IN AN ORGANIZED HEALTH CARE EDUCATION/TRAINING PROGRAM

## 2023-12-19 PROCEDURE — 3078F DIAST BP <80 MM HG: CPT | Mod: HCNC,CPTII,S$GLB, | Performed by: STUDENT IN AN ORGANIZED HEALTH CARE EDUCATION/TRAINING PROGRAM

## 2023-12-19 PROCEDURE — 3044F PR MOST RECENT HEMOGLOBIN A1C LEVEL <7.0%: ICD-10-PCS | Mod: HCNC,CPTII,S$GLB, | Performed by: STUDENT IN AN ORGANIZED HEALTH CARE EDUCATION/TRAINING PROGRAM

## 2023-12-19 PROCEDURE — 99214 PR OFFICE/OUTPT VISIT, EST, LEVL IV, 30-39 MIN: ICD-10-PCS | Mod: HCNC,S$GLB,, | Performed by: STUDENT IN AN ORGANIZED HEALTH CARE EDUCATION/TRAINING PROGRAM

## 2023-12-19 PROCEDURE — 99214 OFFICE O/P EST MOD 30 MIN: CPT | Mod: HCNC,S$GLB,, | Performed by: STUDENT IN AN ORGANIZED HEALTH CARE EDUCATION/TRAINING PROGRAM

## 2023-12-19 PROCEDURE — 3077F SYST BP >= 140 MM HG: CPT | Mod: HCNC,CPTII,S$GLB, | Performed by: STUDENT IN AN ORGANIZED HEALTH CARE EDUCATION/TRAINING PROGRAM

## 2023-12-19 PROCEDURE — 3077F PR MOST RECENT SYSTOLIC BLOOD PRESSURE >= 140 MM HG: ICD-10-PCS | Mod: HCNC,CPTII,S$GLB, | Performed by: STUDENT IN AN ORGANIZED HEALTH CARE EDUCATION/TRAINING PROGRAM

## 2023-12-19 PROCEDURE — 1160F RVW MEDS BY RX/DR IN RCRD: CPT | Mod: HCNC,CPTII,S$GLB, | Performed by: STUDENT IN AN ORGANIZED HEALTH CARE EDUCATION/TRAINING PROGRAM

## 2023-12-19 PROCEDURE — 3044F HG A1C LEVEL LT 7.0%: CPT | Mod: HCNC,CPTII,S$GLB, | Performed by: STUDENT IN AN ORGANIZED HEALTH CARE EDUCATION/TRAINING PROGRAM

## 2023-12-19 PROCEDURE — 1159F MED LIST DOCD IN RCRD: CPT | Mod: HCNC,CPTII,S$GLB, | Performed by: STUDENT IN AN ORGANIZED HEALTH CARE EDUCATION/TRAINING PROGRAM

## 2023-12-19 PROCEDURE — 3078F PR MOST RECENT DIASTOLIC BLOOD PRESSURE < 80 MM HG: ICD-10-PCS | Mod: HCNC,CPTII,S$GLB, | Performed by: STUDENT IN AN ORGANIZED HEALTH CARE EDUCATION/TRAINING PROGRAM

## 2023-12-19 PROCEDURE — 1160F PR REVIEW ALL MEDS BY PRESCRIBER/CLIN PHARMACIST DOCUMENTED: ICD-10-PCS | Mod: HCNC,CPTII,S$GLB, | Performed by: STUDENT IN AN ORGANIZED HEALTH CARE EDUCATION/TRAINING PROGRAM

## 2023-12-19 PROCEDURE — 99999 PR PBB SHADOW E&M-EST. PATIENT-LVL V: ICD-10-PCS | Mod: PBBFAC,HCNC,, | Performed by: STUDENT IN AN ORGANIZED HEALTH CARE EDUCATION/TRAINING PROGRAM

## 2023-12-19 NOTE — PROGRESS NOTES
Hematology- Oncology Clinic Note :      12/19/2023    RFV / chief complaint- Other iron deficiency anemia        HPI  Pt is a 73 y.o. female who  has a past medical history of Allergic rhinitis, Amblyopia, Carotid stenosis, Coronary atherosclerosis, Dyslipidemia, ESBL (extended spectrum beta-lactamase) producing bacteria infection, Hypertension, Nausea and vomiting (05/26/2017), Pulmonary emphysema (10/28/2023), SAH (subarachnoid hemorrhage) (08/24/2016), and Subarachnoid hemorrhage due to ruptured aneurysm (1999).   Pt presents to the clinic today for anemia    Here with .   C/o fatigue, stable . Recovering and improved strength after covid infection last month in Nov 2023.   Walking with walker.       Bleeding- severe nose bleed in Feb 2023, went to the ER, rhino rocket was placed. Has seen ENT since then. No more bleeding recently. No other bleeding reported  Oral iron -  gi side effects.     5/8/2023 5/15/2023 5/22/2023 6/22/2023 7/6/2023   Onc All Plan Medications        iron sucrose (VENOFER)  mg  200 mg  200 mg  200 mg  200 mg        Reviewed past medical/surgical/social history    Past Medical History:   Diagnosis Date    Allergic rhinitis     Amblyopia     Carotid stenosis     Coronary atherosclerosis     [facility] 6/2014: 20% mLAD, 50% mCx, 20%, mRCA    Dyslipidemia     ESBL (extended spectrum beta-lactamase) producing bacteria infection     UTI    Hypertension     Nausea and vomiting 05/26/2017    Pulmonary emphysema 10/28/2023    SAH (subarachnoid hemorrhage) 08/24/2016 1999, s/p clipping    Subarachnoid hemorrhage due to ruptured aneurysm 1999      Past Surgical History:   Procedure Laterality Date    ADENOIDECTOMY      ANTERIOR CRUCIATE LIGAMENT REPAIR  2012    APPENDECTOMY      CATARACT EXTRACTION W/  INTRAOCULAR LENS IMPLANT Right 11/07/2022    Procedure: EXTRACTION, CATARACT, WITH IOL INSERTION;  Surgeon: Higinio Cristina MD;  Location: [facility];  Service: Ophthalmology;   Laterality: Right;    CATARACT EXTRACTION W/  INTRAOCULAR LENS IMPLANT Left 2022    Procedure: EXTRACTION, CATARACT, WITH IOL INSERTION;  Surgeon: Higinio Cristina MD;  Location: [facility];  Service: Ophthalmology;  Laterality: Left;    CEREBRAL ANEURYSM REPAIR      [facility]    DENTAL SURGERY      EYE SURGERY Bilateral     cataract removal and lens implants    HIP ARTHROPLASTY Left 2023    Procedure: TOTAL HIP ARTHROPLASTY;  Surgeon: Juan Wan MD;  Location: [address];  Service: Orthopedics;  Laterality: Left;    TONSILLECTOMY        Review of patient's allergies indicates:  No Known Allergies   Social History     Tobacco Use    Smoking status: Former     Current packs/day: 0.00     Average packs/day: 0.5 packs/day for 20.0 years (10.0 ttl pk-yrs)     Types: Cigarettes     Start date: 1979     Quit date: 1999     Years since quittin.9     Passive exposure: Past    Smokeless tobacco: Never   Substance Use Topics    Alcohol use: Yes     Alcohol/week: 7.0 standard drinks of alcohol     Types: 7 Glasses of wine per week     Comment: One glass of Red wine prior to dinner      Family History   Problem Relation Age of Onset    Hypertension Mother     Aneurysm Mother     Hypertension Father     Alcohol abuse Father     Kidney disease Brother     Heart disease Brother     Gout Brother     No Known Problems Daughter     No Known Problems Daughter     No Known Problems Daughter           Review of Systems :  Review of Systems   Constitutional:  Positive for malaise/fatigue. Negative for chills, diaphoresis, fever and weight loss.   HENT: Negative.  Negative for congestion, hearing loss, nosebleeds, sore throat and tinnitus.    Eyes: Negative.  Negative for blurred vision and discharge.   Respiratory:  Negative for cough, hemoptysis, sputum production, shortness of breath and wheezing.    Cardiovascular: Negative.  Negative for chest pain, palpitations and leg swelling.  "  Gastrointestinal: Negative.  Negative for abdominal pain, blood in stool, constipation, diarrhea, heartburn, melena, nausea and vomiting.   Genitourinary: Negative.    Musculoskeletal:  Positive for joint pain. Negative for back pain, falls and myalgias.   Skin: Negative.  Negative for itching and rash.   Neurological: Negative.  Negative for dizziness, tingling, sensory change, speech change, focal weakness, seizures, loss of consciousness, weakness and headaches.   Endo/Heme/Allergies: Negative.  Does not bruise/bleed easily.   Psychiatric/Behavioral: Negative.  Negative for depression. The patient is not nervous/anxious and does not have insomnia.                Physical Exam :  BP (!) 159/79 (BP Location: Right arm, Patient Position: Sitting, BP Method: Small (Automatic))   Pulse 83   Temp 98 °F (36.7 °C) (Oral)   Resp 19   Ht 5' 3" (1.6 m)   Wt 54.3 kg (119 lb 11.4 oz)   SpO2 96%   BMI 21.21 kg/m²   Wt Readings from Last 3 Encounters:   12/19/23 54.3 kg (119 lb 11.4 oz)   12/01/23 53.8 kg (118 lb 9.7 oz)   12/01/23 53.8 kg (118 lb 9.7 oz)       Body mass index is 21.21 kg/m².      Physical Exam  Vitals and nursing note reviewed.   Constitutional:       General: She is not in acute distress.     Appearance: Normal appearance. She is not ill-appearing.   HENT:      Head: Normocephalic and atraumatic.      Right Ear: External ear normal.      Left Ear: External ear normal.      Mouth/Throat:      Pharynx: No oropharyngeal exudate.   Eyes:      General: No scleral icterus.        Right eye: No discharge.         Left eye: No discharge.   Cardiovascular:      Rate and Rhythm: Normal rate.   Pulmonary:      Effort: Pulmonary effort is normal. No respiratory distress.   Abdominal:      General: There is no distension.   Musculoskeletal:         General: No swelling or deformity.      Cervical back: Normal range of motion and neck supple.      Right lower leg: No edema.      Left lower leg: No edema.   Skin:   "   Coloration: Skin is not jaundiced.      Findings: No bruising, erythema, lesion or rash.   Neurological:      General: No focal deficit present.      Mental Status: She is alert and oriented to person, place, and time. Mental status is at baseline.      Coordination: Coordination normal.      Gait: Gait normal.   Psychiatric:         Mood and Affect: Mood normal.         Behavior: Behavior normal.             Current Outpatient Medications   Medication Sig Dispense Refill    acetaminophen (TYLENOL) 650 MG TbSR Take 1 tablet (650 mg total) by mouth every 6 to 8 hours as needed (pain (mild-moderate)). 120 tablet 0    allopurinoL (ZYLOPRIM) 100 MG tablet Take 2 tablets (200 mg total) by mouth once daily. 180 tablet 3    amLODIPine (NORVASC) 5 MG tablet TAKE 1 TABLET BY MOUTH EVERY DAY 90 tablet 1    aspirin (ECOTRIN) 81 MG EC tablet Take 81 mg by mouth once daily.      atorvastatin (LIPITOR) 80 MG tablet TAKE 1 TABLET BY MOUTH EVERY DAY 90 tablet 3    carvediloL (COREG) 6.25 MG tablet Take 1 tablet (6.25 mg total) by mouth 2 (two) times daily with meals. 60 tablet 11    celecoxib (CELEBREX) 200 MG capsule Take 1 capsule (200 mg total) by mouth as needed.      colchicine, gout, (COLCRYS) 0.6 mg tablet Take 1 tablet (0.6 mg total) by mouth once daily. As needed for gout 30 tablet 11    COMIRNATY 2023-24, 12Y UP,,PF, 30 mcg/0.3 mL inection       FLUAD QUAD 2023-24,65Y UP,,PF, 60 mcg (15 mcg x 4)/0.5 mL Syrg       fluticasone furoate-vilanteroL (BREO) 100-25 mcg/dose diskus inhaler Inhale 1 puff into the lungs once daily. Controller 30 each 11    fluticasone propionate (FLONASE) 50 mcg/actuation nasal spray SPRAY 2 pumps INTO EACH NOSTRIL ONCE DAILY 16 mL 3    folic acid (FOLVITE) 1 MG tablet Take 1 tablet (1 mg total) by mouth once daily. 90 tablet 3    hydroCHLOROthiazide (HYDRODIURIL) 25 MG tablet Take 1 tablet (25 mg total) by mouth once daily. 90 tablet 3    melatonin (MELATIN) 3 mg tablet Take 2 tablets (6 mg  total) by mouth nightly as needed for Insomnia.  0    montelukast (SINGULAIR) 10 mg tablet Take 1 tablet (10 mg total) by mouth every evening. 90 tablet 11    multivitamin (THERAGRAN) tablet Take 1 tablet by mouth once daily. 90 tablet 3    ORTHOVISC 30 mg/2 mL       pantoprazole (PROTONIX) 40 MG tablet Take 1 tablet (40 mg total) by mouth once daily. 90 tablet 0    prenatal no122/iron/folic acid (PRENATAL MULTI ORAL) Take 1 tablet by mouth once daily.      QUEtiapine (SEROQUEL) 50 MG tablet Take 1 tablet (50 mg total) by mouth every evening. 30 tablet 0    tiotropium bromide (SPIRIVA RESPIMAT) 2.5 mcg/actuation inhaler Inhale 2 puffs into the lungs once daily. Controller 4 g 2    VENOFER 100 mg iron/5 mL injection        No current facility-administered medications for this visit.     Facility-Administered Medications Ordered in Other Visits   Medication Dose Route Frequency Provider Last Rate Last Admin    mupirocin 2 % ointment   Nasal On Call Procedure Kang Diaz MD   Given at 10/25/23 1045    tranexamic acid (CYKLOKAPRON) 1,000 mg in sodium chloride 0.9 % 100 mL IVPB (MB+)  1,000 mg Intravenous Once Kang Diaz MD        tranexamic acid (CYKLOKAPRON) 1,000 mg in sodium chloride 0.9 % 100 mL IVPB (MB+)  1,000 mg Intravenous Once Kang Diaz MD           Pertinent Diagnostic studies:      Lab Visit on 12/15/2023   Component Date Value Ref Range Status    WBC 12/15/2023 12.46  3.90 - 12.70 K/uL Final    RBC 12/15/2023 3.08 (L)  4.00 - 5.40 M/uL Final    Hemoglobin 12/15/2023 10.7 (L)  12.0 - 16.0 g/dL Final    Hematocrit 12/15/2023 33.4 (L)  37.0 - 48.5 % Final    MCV 12/15/2023 108 (H)  82 - 98 fL Final    MCH 12/15/2023 34.7 (H)  27.0 - 31.0 pg Final    MCHC 12/15/2023 32.0  32.0 - 36.0 g/dL Final    RDW 12/15/2023 18.6 (H)  11.5 - 14.5 % Final    Platelets 12/15/2023 330  150 - 450 K/uL Final    MPV 12/15/2023 10.2  9.2 - 12.9 fL Final    Immature Granulocytes 12/15/2023 2.1 (H)  0.0 - 0.5 %  Final    Gran # (ANC) 12/15/2023 7.2  1.8 - 7.7 K/uL Final    Immature Grans (Abs) 12/15/2023 0.26 (H)  0.00 - 0.04 K/uL Final    Comment: Mild elevation in immature granulocytes is non specific and   can be seen in a variety of conditions including stress response,   acute inflammation, trauma and pregnancy. Correlation with other   laboratory and clinical findings is essential.      Lymph # 12/15/2023 2.0  1.0 - 4.8 K/uL Final    Mono # 12/15/2023 2.0 (H)  0.3 - 1.0 K/uL Final    Eos # 12/15/2023 0.8 (H)  0.0 - 0.5 K/uL Final    Baso # 12/15/2023 0.21 (H)  0.00 - 0.20 K/uL Final    nRBC 12/15/2023 0  0 /100 WBC Final    Gran % 12/15/2023 57.5  38.0 - 73.0 % Final    Lymph % 12/15/2023 16.1 (L)  18.0 - 48.0 % Final    Mono % 12/15/2023 16.0 (H)  4.0 - 15.0 % Final    Eosinophil % 12/15/2023 6.6  0.0 - 8.0 % Final    Basophil % 12/15/2023 1.7  0.0 - 1.9 % Final    Differential Method 12/15/2023 Automated   Final    Ferritin 12/15/2023 508 (H)  20.0 - 300.0 ng/mL Final    Iron 12/15/2023 49  30 - 160 ug/dL Final    Transferrin 12/15/2023 189 (L)  200 - 375 mg/dL Final    TIBC 12/15/2023 280  250 - 450 ug/dL Final    Saturated Iron 12/15/2023 18 (L)  20 - 50 % Final           Oncology History    No history exists.     Assessment :       1. Chronic anemia    2. Other iron deficiency anemia    3. Stage 3a chronic kidney disease          Plan :       Iron def anemia due to blood loss /nose bleed/ surgeries  Intolerant to oral iron due to gi side effects.   Iv iron given in May with repeat labs in Aug with improvement in hb. Iron levels was still low, additional iron infusions given in Sept/ Oct 2023  Admitted with Covid in Nov 2023.   Labs this visit with elevated ferritin level. 12/15/23 hb 10.7. macrocytosis noted.   Continue monitoring. We discussed that Bmbx can be done if levels worsen or other level lines are involved.   ER precautions for bleeding.       Discussed side effects of Injectafer (or venofer) which  include but are not limited to infusion reaction, shortness of breath, hives, rash, flushing, itching, headaches, skin discoloration at the injection site, nausea, vomiting, low phosphorus level, elevated blood pressure, elevated liver enzymes, risks to the unborn baby if pregnant. Patient understands the risks and agrees with proceeding with Injectafer (or venofer).     CKD- chronic, stable, montior    Joint pains- chronic, stable, per ortho      Route Chart for Scheduling  Med Onc Route Chart for Scheduling      Therapy Plan Information  ZOLEDRONIC ACID (RECLAST) VISIT  Medications  zoledronic acid-mannitol & water 5 mg/100 mL infusion 5 mg  5 mg, Intravenous, Every visit  Flushes  sodium chloride 0.9% flush 10 mL  10 mL, Intravenous, Every visit  sodium chloride 0.9% 100 mL flush bag  Intravenous, Every visit    VENOFER (IRON SUCROSE) QW  Flushes  sodium chloride 0.9% 250 mL flush bag  Intravenous, 1 time a week  sodium chloride 0.9% flush 10 mL  10 mL, Intravenous, 1 time a week  heparin, porcine (PF) 100 unit/mL injection flush 500 Units  500 Units, Intravenous, 1 time a week  alteplase injection 2 mg  2 mg, Intra-Catheter, 1 time a week  PRN Medications  EPINEPHrine (EPIPEN) 0.3 mg/0.3 mL pen injection 0.3 mg  0.3 mg, Intramuscular, PRN  diphenhydrAMINE injection 50 mg  50 mg, Intravenous, PRN  methylPREDNISolone sodium succinate injection 125 mg  125 mg, Intravenous, PRN  sodium chloride 0.9% bolus 1,000 mL 1,000 mL  1,000 mL, Intravenous, PRN  5. Medications  iron sucrose (VENOFER) 300 mg in sodium chloride 0.9% 100 mL IVPB  300 mg, Intravenous, Every visit        Electronically signed by Anh Martinez    [facility] Future Appointments   Date Time Provider Department Center   12/27/2023 10:30 AM Juan Naranjo MD [facility] [id number] [place]   12/29/2023  2:00 PM Carmela Ace NP [facility] PSYCH [place] [facility]   2/26/2024 10:30 AM Armani Cai PA-C [facility] [id number] [place]   3/5/2024  9:00 AM  Michael Lal MD [facility] [id number] [facility]   6/17/2024  9:00 AM [facility] DEXA1 [facility] BMD [facility]     I spent > 30 mins on reviewing epic chart notes, reviewing tests, nursing concerns,obtaining history, performing physical exam, counseling and educating patient/family/caregiver, documentation, independently interpreting results and discussing them with patient/family/caregiver, care coordination, ordering medications/ tests/ procedures and referring and communicating with other health care professionals.           This note was created with voice recognition software.  Grammatical, syntax and spelling errors may be inevitable.

## 2023-12-21 ENCOUNTER — EXTERNAL HOME HEALTH (OUTPATIENT)
Dept: HOME HEALTH SERVICES | Facility: HOSPITAL | Age: 73
End: 2023-12-21
Payer: MEDICARE

## 2023-12-22 ENCOUNTER — PATIENT MESSAGE (OUTPATIENT)
Dept: TRANSPLANT | Facility: CLINIC | Age: 73
End: 2023-12-22
Payer: MEDICARE

## 2023-12-22 ENCOUNTER — PATIENT MESSAGE (OUTPATIENT)
Dept: PRIMARY CARE CLINIC | Facility: CLINIC | Age: 73
End: 2023-12-22
Payer: MEDICARE

## 2023-12-25 ENCOUNTER — HOSPITAL ENCOUNTER (INPATIENT)
Facility: HOSPITAL | Age: 73
LOS: 3 days | Discharge: HOME HEALTH | DRG: 871 | End: 2023-12-29
Attending: EMERGENCY MEDICINE | Admitting: EMERGENCY MEDICINE
Payer: MEDICARE

## 2023-12-25 DIAGNOSIS — K21.9 GASTROESOPHAGEAL REFLUX DISEASE WITHOUT ESOPHAGITIS: Chronic | ICD-10-CM

## 2023-12-25 DIAGNOSIS — R07.9 CHEST PAIN: ICD-10-CM

## 2023-12-25 DIAGNOSIS — R06.02 SHORTNESS OF BREATH: ICD-10-CM

## 2023-12-25 DIAGNOSIS — R09.02 HYPOXIA: ICD-10-CM

## 2023-12-25 DIAGNOSIS — I49.9 ARRHYTHMIA: ICD-10-CM

## 2023-12-25 DIAGNOSIS — J10.1 INFLUENZA A: ICD-10-CM

## 2023-12-25 DIAGNOSIS — J44.1 COPD EXACERBATION: Primary | ICD-10-CM

## 2023-12-25 PROBLEM — R63.4 WEIGHT LOSS: Status: ACTIVE | Noted: 2023-12-25

## 2023-12-25 PROBLEM — A41.9 SEPSIS: Status: ACTIVE | Noted: 2023-12-25

## 2023-12-25 LAB
ALBUMIN SERPL BCP-MCNC: 2.6 G/DL (ref 3.5–5.2)
ALLENS TEST: ABNORMAL
ALP SERPL-CCNC: 97 U/L (ref 55–135)
ALT SERPL W/O P-5'-P-CCNC: 13 U/L (ref 10–44)
ANION GAP SERPL CALC-SCNC: 13 MMOL/L (ref 8–16)
ANISOCYTOSIS BLD QL SMEAR: SLIGHT
AST SERPL-CCNC: 21 U/L (ref 10–40)
BACTERIA #/AREA URNS AUTO: ABNORMAL /HPF
BASO STIPL BLD QL SMEAR: ABNORMAL
BASOPHILS NFR BLD: 0 % (ref 0–1.9)
BILIRUB SERPL-MCNC: 0.7 MG/DL (ref 0.1–1)
BILIRUB UR QL STRIP: NEGATIVE
BNP SERPL-MCNC: 485 PG/ML (ref 0–99)
BUN SERPL-MCNC: 13 MG/DL (ref 8–23)
CALCIUM SERPL-MCNC: 8.5 MG/DL (ref 8.7–10.5)
CHLORIDE SERPL-SCNC: 100 MMOL/L (ref 95–110)
CLARITY UR REFRACT.AUTO: CLEAR
CO2 SERPL-SCNC: 20 MMOL/L (ref 23–29)
COLOR UR AUTO: YELLOW
CREAT SERPL-MCNC: 0.9 MG/DL (ref 0.5–1.4)
DIFFERENTIAL METHOD BLD: ABNORMAL
EOSINOPHIL NFR BLD: 0 % (ref 0–8)
ERYTHROCYTE [DISTWIDTH] IN BLOOD BY AUTOMATED COUNT: 18.2 % (ref 11.5–14.5)
EST. GFR  (NO RACE VARIABLE): >60 ML/MIN/1.73 M^2
FERRITIN SERPL-MCNC: 611 NG/ML (ref 20–300)
GLUCOSE SERPL-MCNC: 134 MG/DL (ref 70–110)
GLUCOSE UR QL STRIP: NEGATIVE
HAPTOGLOB SERPL-MCNC: 264 MG/DL (ref 30–250)
HCO3 UR-SCNC: 21.7 MMOL/L (ref 24–28)
HCT VFR BLD AUTO: 33.1 % (ref 37–48.5)
HGB BLD-MCNC: 10.7 G/DL (ref 12–16)
HGB UR QL STRIP: NEGATIVE
HYALINE CASTS UR QL AUTO: 1 /LPF
IMM GRANULOCYTES # BLD AUTO: ABNORMAL K/UL (ref 0–0.04)
IMM GRANULOCYTES NFR BLD AUTO: ABNORMAL % (ref 0–0.5)
INFLUENZA A, MOLECULAR: DETECTED
INFLUENZA B, MOLECULAR: NOT DETECTED
IRON SERPL-MCNC: <10 UG/DL (ref 30–160)
KETONES UR QL STRIP: NEGATIVE
LACTATE SERPL-SCNC: 0.9 MMOL/L (ref 0.5–2.2)
LDH SERPL L TO P-CCNC: 371 U/L (ref 110–260)
LEUKOCYTE ESTERASE UR QL STRIP: ABNORMAL
LYMPHOCYTES NFR BLD: 3 % (ref 18–48)
MCH RBC QN AUTO: 35 PG (ref 27–31)
MCHC RBC AUTO-ENTMCNC: 32.3 G/DL (ref 32–36)
MCV RBC AUTO: 108 FL (ref 82–98)
MICROSCOPIC COMMENT: ABNORMAL
MONOCYTES NFR BLD: 2 % (ref 4–15)
NEUTROPHILS NFR BLD: 90.5 % (ref 38–73)
NEUTS BAND NFR BLD MANUAL: 4.5 %
NITRITE UR QL STRIP: POSITIVE
NRBC BLD-RTO: 0 /100 WBC
PCO2 BLDA: 33 MMHG (ref 35–45)
PH SMN: 7.43 [PH] (ref 7.35–7.45)
PH UR STRIP: 6 [PH] (ref 5–8)
PLATELET # BLD AUTO: 303 K/UL (ref 150–450)
PLATELET BLD QL SMEAR: ABNORMAL
PMV BLD AUTO: 10.4 FL (ref 9.2–12.9)
PO2 BLDA: 36 MMHG (ref 40–60)
POC BE: -3 MMOL/L
POC SATURATED O2: 72 % (ref 95–100)
POC TCO2: 23 MMOL/L (ref 24–29)
POIKILOCYTOSIS BLD QL SMEAR: SLIGHT
POTASSIUM SERPL-SCNC: 2.9 MMOL/L (ref 3.5–5.1)
PROCALCITONIN SERPL IA-MCNC: 3.25 NG/ML
PROT SERPL-MCNC: 6.8 G/DL (ref 6–8.4)
PROT UR QL STRIP: ABNORMAL
RBC # BLD AUTO: 3.06 M/UL (ref 4–5.4)
RBC #/AREA URNS AUTO: 2 /HPF (ref 0–4)
RETICS/RBC NFR AUTO: 4 % (ref 0.5–2.5)
RSV AG BY MOLECULAR METHOD: NOT DETECTED
SAMPLE: ABNORMAL
SARS-COV-2 RNA RESP QL NAA+PROBE: NOT DETECTED
SATURATED IRON: ABNORMAL % (ref 20–50)
SITE: ABNORMAL
SODIUM SERPL-SCNC: 133 MMOL/L (ref 136–145)
SP GR UR STRIP: 1.01 (ref 1–1.03)
TOTAL IRON BINDING CAPACITY: 247 UG/DL (ref 250–450)
TOXIC GRANULES BLD QL SMEAR: PRESENT
TRANSFERRIN SERPL-MCNC: 167 MG/DL (ref 200–375)
TROPONIN I SERPL DL<=0.01 NG/ML-MCNC: 0.01 NG/ML (ref 0–0.03)
URN SPEC COLLECT METH UR: ABNORMAL
WBC # BLD AUTO: 31.65 K/UL (ref 3.9–12.7)
WBC #/AREA URNS AUTO: 11 /HPF (ref 0–5)

## 2023-12-25 PROCEDURE — 25000242 PHARM REV CODE 250 ALT 637 W/ HCPCS: Mod: HCNC

## 2023-12-25 PROCEDURE — 99900035 HC TECH TIME PER 15 MIN (STAT): Mod: HCNC

## 2023-12-25 PROCEDURE — 93010 ELECTROCARDIOGRAM REPORT: CPT | Mod: HCNC,,, | Performed by: INTERNAL MEDICINE

## 2023-12-25 PROCEDURE — G0378 HOSPITAL OBSERVATION PER HR: HCPCS | Mod: HCNC

## 2023-12-25 PROCEDURE — 85045 AUTOMATED RETICULOCYTE COUNT: CPT | Mod: HCNC

## 2023-12-25 PROCEDURE — 83880 ASSAY OF NATRIURETIC PEPTIDE: CPT | Mod: HCNC

## 2023-12-25 PROCEDURE — 0241U SARS-COV2 (COVID) WITH FLU/RSV BY PCR: CPT | Mod: HCNC

## 2023-12-25 PROCEDURE — 25000003 PHARM REV CODE 250: Mod: HCNC

## 2023-12-25 PROCEDURE — 27100171 HC OXYGEN HIGH FLOW UP TO 24 HOURS: Mod: HCNC

## 2023-12-25 PROCEDURE — 63600175 PHARM REV CODE 636 W HCPCS: Mod: HCNC | Performed by: EMERGENCY MEDICINE

## 2023-12-25 PROCEDURE — 83615 LACTATE (LD) (LDH) ENZYME: CPT | Mod: HCNC

## 2023-12-25 PROCEDURE — 85027 COMPLETE CBC AUTOMATED: CPT | Mod: HCNC

## 2023-12-25 PROCEDURE — 36415 COLL VENOUS BLD VENIPUNCTURE: CPT | Mod: HCNC

## 2023-12-25 PROCEDURE — 87086 URINE CULTURE/COLONY COUNT: CPT | Mod: HCNC

## 2023-12-25 PROCEDURE — 96366 THER/PROPH/DIAG IV INF ADDON: CPT | Mod: HCNC

## 2023-12-25 PROCEDURE — 63600175 PHARM REV CODE 636 W HCPCS: Mod: HCNC

## 2023-12-25 PROCEDURE — 85007 BL SMEAR W/DIFF WBC COUNT: CPT | Mod: HCNC

## 2023-12-25 PROCEDURE — 87040 BLOOD CULTURE FOR BACTERIA: CPT | Mod: 59,HCNC

## 2023-12-25 PROCEDURE — 82728 ASSAY OF FERRITIN: CPT | Mod: HCNC

## 2023-12-25 PROCEDURE — 84165 PROTEIN E-PHORESIS SERUM: CPT | Mod: HCNC

## 2023-12-25 PROCEDURE — 94760 N-INVAS EAR/PLS OXIMETRY 1: CPT | Mod: HCNC,XB

## 2023-12-25 PROCEDURE — 27000190 HC CPAP FULL FACE MASK W/VALVE: Mod: HCNC

## 2023-12-25 PROCEDURE — 86334 IMMUNOFIX E-PHORESIS SERUM: CPT | Mod: 26,HCNC,, | Performed by: PATHOLOGY

## 2023-12-25 PROCEDURE — 94640 AIRWAY INHALATION TREATMENT: CPT | Mod: HCNC,XB

## 2023-12-25 PROCEDURE — 96375 TX/PRO/DX INJ NEW DRUG ADDON: CPT | Mod: HCNC

## 2023-12-25 PROCEDURE — 83521 IG LIGHT CHAINS FREE EACH: CPT | Mod: HCNC

## 2023-12-25 PROCEDURE — 93005 ELECTROCARDIOGRAM TRACING: CPT | Mod: HCNC

## 2023-12-25 PROCEDURE — 86334 IMMUNOFIX E-PHORESIS SERUM: CPT | Mod: HCNC

## 2023-12-25 PROCEDURE — 94660 CPAP INITIATION&MGMT: CPT | Mod: HCNC

## 2023-12-25 PROCEDURE — 83540 ASSAY OF IRON: CPT | Mod: HCNC

## 2023-12-25 PROCEDURE — 25000003 PHARM REV CODE 250: Mod: HCNC | Performed by: EMERGENCY MEDICINE

## 2023-12-25 PROCEDURE — 96367 TX/PROPH/DG ADDL SEQ IV INF: CPT | Mod: HCNC

## 2023-12-25 PROCEDURE — 84165 PROTEIN E-PHORESIS SERUM: CPT | Mod: 26,HCNC,, | Performed by: PATHOLOGY

## 2023-12-25 PROCEDURE — 83010 ASSAY OF HAPTOGLOBIN QUANT: CPT | Mod: HCNC

## 2023-12-25 PROCEDURE — 81001 URINALYSIS AUTO W/SCOPE: CPT | Mod: HCNC

## 2023-12-25 PROCEDURE — 80053 COMPREHEN METABOLIC PANEL: CPT | Mod: HCNC

## 2023-12-25 PROCEDURE — 96365 THER/PROPH/DIAG IV INF INIT: CPT | Mod: 59,HCNC

## 2023-12-25 PROCEDURE — 84484 ASSAY OF TROPONIN QUANT: CPT | Mod: HCNC

## 2023-12-25 PROCEDURE — 94761 N-INVAS EAR/PLS OXIMETRY MLT: CPT | Mod: HCNC,XB

## 2023-12-25 PROCEDURE — 83605 ASSAY OF LACTIC ACID: CPT | Mod: HCNC

## 2023-12-25 PROCEDURE — 84145 PROCALCITONIN (PCT): CPT | Mod: HCNC

## 2023-12-25 PROCEDURE — 82803 BLOOD GASES ANY COMBINATION: CPT | Mod: HCNC

## 2023-12-25 PROCEDURE — 99285 EMERGENCY DEPT VISIT HI MDM: CPT | Mod: 25,HCNC

## 2023-12-25 RX ORDER — AZITHROMYCIN 250 MG/1
500 TABLET, FILM COATED ORAL DAILY
Status: COMPLETED | OUTPATIENT
Start: 2023-12-26 | End: 2023-12-27

## 2023-12-25 RX ORDER — FUROSEMIDE 10 MG/ML
20 INJECTION INTRAMUSCULAR; INTRAVENOUS ONCE
Status: COMPLETED | OUTPATIENT
Start: 2023-12-25 | End: 2023-12-25

## 2023-12-25 RX ORDER — POTASSIUM CHLORIDE 20 MEQ/1
40 TABLET, EXTENDED RELEASE ORAL
Status: DISCONTINUED | OUTPATIENT
Start: 2023-12-25 | End: 2023-12-25

## 2023-12-25 RX ORDER — TALC
6 POWDER (GRAM) TOPICAL NIGHTLY PRN
Status: DISCONTINUED | OUTPATIENT
Start: 2023-12-25 | End: 2023-12-29 | Stop reason: HOSPADM

## 2023-12-25 RX ORDER — IPRATROPIUM BROMIDE AND ALBUTEROL SULFATE 2.5; .5 MG/3ML; MG/3ML
3 SOLUTION RESPIRATORY (INHALATION)
Status: COMPLETED | OUTPATIENT
Start: 2023-12-25 | End: 2023-12-25

## 2023-12-25 RX ORDER — CARVEDILOL 3.12 MG/1
6.25 TABLET ORAL 2 TIMES DAILY WITH MEALS
Status: DISCONTINUED | OUTPATIENT
Start: 2023-12-25 | End: 2023-12-29 | Stop reason: HOSPADM

## 2023-12-25 RX ORDER — GLUCAGON 1 MG
1 KIT INJECTION
Status: DISCONTINUED | OUTPATIENT
Start: 2023-12-25 | End: 2023-12-29 | Stop reason: HOSPADM

## 2023-12-25 RX ORDER — IBUPROFEN 200 MG
16 TABLET ORAL
Status: DISCONTINUED | OUTPATIENT
Start: 2023-12-25 | End: 2023-12-29 | Stop reason: HOSPADM

## 2023-12-25 RX ORDER — ONDANSETRON 8 MG/1
8 TABLET, ORALLY DISINTEGRATING ORAL EVERY 8 HOURS PRN
Status: DISCONTINUED | OUTPATIENT
Start: 2023-12-25 | End: 2023-12-28

## 2023-12-25 RX ORDER — PANTOPRAZOLE SODIUM 40 MG/1
40 TABLET, DELAYED RELEASE ORAL DAILY
Status: DISCONTINUED | OUTPATIENT
Start: 2023-12-25 | End: 2023-12-29 | Stop reason: HOSPADM

## 2023-12-25 RX ORDER — MAGNESIUM SULFATE HEPTAHYDRATE 40 MG/ML
2 INJECTION, SOLUTION INTRAVENOUS
Status: COMPLETED | OUTPATIENT
Start: 2023-12-25 | End: 2023-12-25

## 2023-12-25 RX ORDER — ATORVASTATIN CALCIUM 40 MG/1
80 TABLET, FILM COATED ORAL DAILY
Status: DISCONTINUED | OUTPATIENT
Start: 2023-12-25 | End: 2023-12-29 | Stop reason: HOSPADM

## 2023-12-25 RX ORDER — OSELTAMIVIR PHOSPHATE 75 MG/1
75 CAPSULE ORAL 2 TIMES DAILY
Status: DISCONTINUED | OUTPATIENT
Start: 2023-12-25 | End: 2023-12-26

## 2023-12-25 RX ORDER — NALOXONE HCL 0.4 MG/ML
0.02 VIAL (ML) INJECTION
Status: DISCONTINUED | OUTPATIENT
Start: 2023-12-25 | End: 2023-12-29 | Stop reason: HOSPADM

## 2023-12-25 RX ORDER — FOLIC ACID 1 MG/1
1 TABLET ORAL DAILY
Status: DISCONTINUED | OUTPATIENT
Start: 2023-12-25 | End: 2023-12-29 | Stop reason: HOSPADM

## 2023-12-25 RX ORDER — THIAMINE HCL 100 MG
100 TABLET ORAL DAILY
Status: DISCONTINUED | OUTPATIENT
Start: 2023-12-26 | End: 2023-12-29 | Stop reason: HOSPADM

## 2023-12-25 RX ORDER — CALCIUM CARBONATE 200(500)MG
1000 TABLET,CHEWABLE ORAL 2 TIMES DAILY PRN
Status: DISCONTINUED | OUTPATIENT
Start: 2023-12-25 | End: 2023-12-29 | Stop reason: HOSPADM

## 2023-12-25 RX ORDER — IBUPROFEN 200 MG
24 TABLET ORAL
Status: DISCONTINUED | OUTPATIENT
Start: 2023-12-25 | End: 2023-12-29 | Stop reason: HOSPADM

## 2023-12-25 RX ORDER — FLUTICASONE FUROATE AND VILANTEROL 100; 25 UG/1; UG/1
1 POWDER RESPIRATORY (INHALATION) DAILY
Status: DISCONTINUED | OUTPATIENT
Start: 2023-12-25 | End: 2023-12-29 | Stop reason: HOSPADM

## 2023-12-25 RX ORDER — IPRATROPIUM BROMIDE AND ALBUTEROL SULFATE 2.5; .5 MG/3ML; MG/3ML
3 SOLUTION RESPIRATORY (INHALATION)
Status: DISCONTINUED | OUTPATIENT
Start: 2023-12-25 | End: 2023-12-26

## 2023-12-25 RX ORDER — ASPIRIN 81 MG/1
81 TABLET ORAL DAILY
Status: DISCONTINUED | OUTPATIENT
Start: 2023-12-25 | End: 2023-12-29 | Stop reason: HOSPADM

## 2023-12-25 RX ORDER — GUAIFENESIN 600 MG/1
600 TABLET, EXTENDED RELEASE ORAL 2 TIMES DAILY
Status: DISCONTINUED | OUTPATIENT
Start: 2023-12-25 | End: 2023-12-26

## 2023-12-25 RX ORDER — SODIUM CHLORIDE 0.9 % (FLUSH) 0.9 %
10 SYRINGE (ML) INJECTION EVERY 12 HOURS PRN
Status: DISCONTINUED | OUTPATIENT
Start: 2023-12-25 | End: 2023-12-29 | Stop reason: HOSPADM

## 2023-12-25 RX ORDER — PREDNISONE 20 MG/1
40 TABLET ORAL DAILY
Status: DISCONTINUED | OUTPATIENT
Start: 2023-12-26 | End: 2023-12-29 | Stop reason: HOSPADM

## 2023-12-25 RX ORDER — SIMETHICONE 80 MG
1 TABLET,CHEWABLE ORAL 4 TIMES DAILY PRN
Status: DISCONTINUED | OUTPATIENT
Start: 2023-12-25 | End: 2023-12-29 | Stop reason: HOSPADM

## 2023-12-25 RX ORDER — ALLOPURINOL 100 MG/1
200 TABLET ORAL DAILY
Status: DISCONTINUED | OUTPATIENT
Start: 2023-12-25 | End: 2023-12-29 | Stop reason: HOSPADM

## 2023-12-25 RX ORDER — ACETAMINOPHEN 325 MG/1
650 TABLET ORAL EVERY 4 HOURS PRN
Status: DISCONTINUED | OUTPATIENT
Start: 2023-12-25 | End: 2023-12-29 | Stop reason: HOSPADM

## 2023-12-25 RX ORDER — MONTELUKAST SODIUM 10 MG/1
10 TABLET ORAL NIGHTLY
Status: DISCONTINUED | OUTPATIENT
Start: 2023-12-25 | End: 2023-12-29 | Stop reason: HOSPADM

## 2023-12-25 RX ADMIN — IPRATROPIUM BROMIDE AND ALBUTEROL SULFATE 3 ML: .5; 3 SOLUTION RESPIRATORY (INHALATION) at 08:12

## 2023-12-25 RX ADMIN — Medication 6 MG: at 10:12

## 2023-12-25 RX ADMIN — OSELTAMIVIR PHOSPHATE 75 MG: 75 CAPSULE ORAL at 10:12

## 2023-12-25 RX ADMIN — ASPIRIN 81 MG: 81 TABLET, COATED ORAL at 12:12

## 2023-12-25 RX ADMIN — IPRATROPIUM BROMIDE AND ALBUTEROL SULFATE 3 ML: .5; 3 SOLUTION RESPIRATORY (INHALATION) at 04:12

## 2023-12-25 RX ADMIN — AZITHROMYCIN 500 MG: 500 INJECTION, POWDER, LYOPHILIZED, FOR SOLUTION INTRAVENOUS at 06:12

## 2023-12-25 RX ADMIN — ATORVASTATIN CALCIUM 80 MG: 40 TABLET, FILM COATED ORAL at 09:12

## 2023-12-25 RX ADMIN — MONTELUKAST 10 MG: 10 TABLET, FILM COATED ORAL at 10:12

## 2023-12-25 RX ADMIN — GUAIFENESIN AND DEXTROMETHORPHAN HYDROBROMIDE 1 TABLET: 600; 30 TABLET, EXTENDED RELEASE ORAL at 10:12

## 2023-12-25 RX ADMIN — IPRATROPIUM BROMIDE AND ALBUTEROL SULFATE 3 ML: .5; 3 SOLUTION RESPIRATORY (INHALATION) at 02:12

## 2023-12-25 RX ADMIN — POTASSIUM BICARBONATE 40 MEQ: 391 TABLET, EFFERVESCENT ORAL at 12:12

## 2023-12-25 RX ADMIN — MAGNESIUM SULFATE HEPTAHYDRATE 2 G: 40 INJECTION, SOLUTION INTRAVENOUS at 05:12

## 2023-12-25 RX ADMIN — FOLIC ACID 1 MG: 1 TABLET ORAL at 09:12

## 2023-12-25 RX ADMIN — OSELTAMIVIR PHOSPHATE 75 MG: 75 CAPSULE ORAL at 07:12

## 2023-12-25 RX ADMIN — ALLOPURINOL 200 MG: 100 TABLET ORAL at 09:12

## 2023-12-25 RX ADMIN — FLUTICASONE FUROATE AND VILANTEROL TRIFENATATE 1 PUFF: 100; 25 POWDER RESPIRATORY (INHALATION) at 08:12

## 2023-12-25 RX ADMIN — CARVEDILOL 6.25 MG: 3.12 TABLET, FILM COATED ORAL at 05:12

## 2023-12-25 RX ADMIN — ANTACID TABLETS 1000 MG: 500 TABLET, CHEWABLE ORAL at 01:12

## 2023-12-25 RX ADMIN — POTASSIUM BICARBONATE 40 MEQ: 391 TABLET, EFFERVESCENT ORAL at 10:12

## 2023-12-25 RX ADMIN — CARVEDILOL 6.25 MG: 3.12 TABLET, FILM COATED ORAL at 09:12

## 2023-12-25 RX ADMIN — CEFTRIAXONE SODIUM 1 G: 1 INJECTION, POWDER, FOR SOLUTION INTRAMUSCULAR; INTRAVENOUS at 05:12

## 2023-12-25 RX ADMIN — ACETAMINOPHEN 650 MG: 325 TABLET ORAL at 09:12

## 2023-12-25 RX ADMIN — IPRATROPIUM BROMIDE AND ALBUTEROL SULFATE 3 ML: .5; 3 SOLUTION RESPIRATORY (INHALATION) at 07:12

## 2023-12-25 RX ADMIN — PANTOPRAZOLE SODIUM 40 MG: 40 TABLET, DELAYED RELEASE ORAL at 09:12

## 2023-12-25 RX ADMIN — FUROSEMIDE 20 MG: 10 INJECTION, SOLUTION INTRAMUSCULAR; INTRAVENOUS at 07:12

## 2023-12-25 RX ADMIN — POTASSIUM BICARBONATE 25 MEQ: 978 TABLET, EFFERVESCENT ORAL at 06:12

## 2023-12-25 NOTE — CARE UPDATE
RAPID RESPONSE NURSE ROUND       Rounding completed with charge RNJasper for O2 requirements. Pt on 8L bubble flow 90-96%, weaned from BiPAP in ED prior to admit. Pt leija not appear in any distress, laying in bed with a breathing treatment being administered.3 No additional concerns verbalized at this time. Instructed to call [phone] for further concerns or assistance.

## 2023-12-25 NOTE — ED NOTES
Nurses Note -- 4 Eyes      12/25/2023   7:04 AM      Skin assessed during: Q Shift Change      [] No Altered Skin Integrity Present    []Prevention Measures Documented      [x] Yes- Altered Skin Integrity Present or Discovered   [x] LDA Added if Not in Epic (Describe Wound)   [x] New Altered Skin Integrity was Present on Admit and Documented in LDA   [x] Wound Image Taken    Wound Care Consulted? Yes    Attending Nurse:  Yony Dominguez RN/Staff Member:   Ulisses         
5

## 2023-12-25 NOTE — ASSESSMENT & PLAN NOTE
This patient does have evidence of infective focus  My overall impression is sepsis.  Source: Respiratory  Antibiotics given-   Antibiotics (72h ago, onward)      Start     Stop Route Frequency Ordered    12/26/23 0900  azithromycin tablet 500 mg         12/28/23 0859 Oral Daily 12/25/23 0910    12/26/23 0600  cefTRIAXone (ROCEPHIN) 1 g in dextrose 5 % in water (D5W) 100 mL IVPB (MB+)         -- IV Every 24 hours (non-standard times) 12/25/23 0910          Latest lactate reviewed-  Recent Labs   Lab 12/25/23  0655   LACTATE 0.9     Organ dysfunction indicated by Acute respiratory failure    Fluid challenge Not needed - patient is not hypotensive      Post- resuscitation assessment No - Post resuscitation assessment not needed       Will Not start Pressors- Levophed for MAP of 65  Source control achieved by: Abx    Ceftriaxone and Azithro and Tamiflu. Ua and blood ctx pending

## 2023-12-25 NOTE — ASSESSMENT & PLAN NOTE
"Patient with Hypoxic Respiratory failure which is Acute on chronic.  she is on home oxygen at 1L PRN LPM. Supplemental oxygen was provided and noted- Oxygen Concentration (%):  [30] 30 Signs/symptoms of respiratory failure include- tachypnea, increased work of breathing, respiratory distress, and wheezing. Contributing diagnoses includes - Aspiration, CHF, COPD, and Pneumonia Labs and images were reviewed. Patient Has not had a recent ABG. Will treat underlying causes and adjust management of respiratory failure as follows    73-year-old female with a past medical history of former smoking, CAD, R Carotid stent, dyslipidemia, HTN, gout, anxiety, prev SAH (1999) COPD on home O2 (1L PRN), mild pulmonary hypertension, stress induced reflex syncope, history of ESBL UTI,  now presenting with chief complaint of shortness of breath.  Patient reports that yesterday during the day she experienced worsening shortness of breath and cough.  She has had SOB for weeks, possibly months, and a lingering cough since her COVID infection last month but yesterday had an acute change. Xmas eric she was not feeling well, and around 6 or 7 pm she called ambulence. After some SOB at home and 3L didn't help. Was observed by  to be satting 81 or 82.  Baseline is 94 or 95.  Patient denies any hemoptysis, fevers, or sick contacts. Admits to SAMPSON, productive gray cough, chest tightness with exertion improved with rest, leg swelling, poor exercise tolerance, and balance issues. Reports weight loss of 20-30 lbs since 6 months. Vaxed for flu rsv, pna, and covid. Follows with pulm OP. Recent hospitalization 1 month ago for Covid.   claims pt "does not walk at all" and gets SOB after walking to the restroom. Uses asistance from others or cane / walker to ambulate.     EMS reports that patient had saturation of 80% on room air requiring 3 L nasal cannula.  Albuterol neb was given by EMS in addition to 125 mg of methylprednisone. In the Ed " at presentation her vitals were: /100 P116 RR22 98.5F O2% 93, Initally requiring Bipap, now satting 97 with 8L NC s/p breathing treatment. WBC 31.6 H &H 10.7 33.1  Na 133 K 2.9 CO2 20 Albumin 2.6  Trop WNL Lactate WNL Flu A+ PCO2 33 Po 26 PHCO3 21.7 EKG? Sinus Tach around 115, T wave inversions V3-V5, II, III, AVF, Xray: Patchy right basilar consolidation may relate to infection/pneumonia or aspiration.  Clinical correlation and continued imaging follow-up is recommended. Procal +.     Receive Rocephin, Azithro, Mag, and Steroids in ED. Admitted to 3 for management of Acute Hypoxic Respiratory Failure.       Plan  Q4Hr Breathing treatments  CAP coverage with Ceftriaxone and Azithro  Tamiflu for influenza   Lasix for volume overload 2/2 HFpEF  Continue home inhalers  Prednisone 40 mg qd  Ween O2 as needed

## 2023-12-25 NOTE — RESPIRATORY THERAPY
RAPID RESPONSE RESPIRATORY THERAPY PROACTIVE ROUNDING NOTE             Time of visit: 826     Code Status: Full Code   : 1950  Bed: 57172/01106 A:   MRN: [id number]  Time spent at the bedside: < 15 min    SITUATION    Evaluated patient for: HFNC Compliance     BACKGROUND    Patient has a past medical history of Allergic rhinitis, Amblyopia, Carotid stenosis, Coronary atherosclerosis, Dyslipidemia, ESBL (extended spectrum beta-lactamase) producing bacteria infection, Hypertension, Nausea and vomiting, Pulmonary emphysema, SAH (subarachnoid hemorrhage), and Subarachnoid hemorrhage due to ruptured aneurysm.    24 Hours Vitals Range:  Temp:  [98.2 °F (36.8 °C)-98.6 °F (37 °C)]   Pulse:  []   Resp:  [19-27]   BP: (116-170)/()   SpO2:  [90 %-95 %]     Labs:    Recent Labs     23  0451   *   K 2.9*      CO2 20*   BUN 13   CREATININE 0.9   *        Recent Labs     23  0506   PH 7.426   PCO2 33.0*   PO2 36*   HCO3 21.7*   POCSATURATED 72   BE -3*       ASSESSMENT/INTERVENTIONS    Pt in bed consulting with MD.    Last VS   Temp: 98.2 °F (36.8 °C) (1200)  Pulse: 90 ( 1200)  Resp: 20 ( 1200)  BP: 116/64 ( 1200)  SpO2: 94 % (1200)    Level of Consciousness: Level of Consciousness (AVPU): alert  Respiratory Effort: Respiratory Effort: Mild Expansion/Accessory Muscle Usage: Expansion/Accessory Muscles/Retractions: no use of accessory muscles, no retractions, expansion symmetric  All Lung Field Breath Sounds: All Lung Fields Breath Sounds: crackles  O2 Device/Concentration: [place] 8LPM  Was the O2 device able to be weaned? No  Ambu at bedside:      Active Orders   Respiratory Care    Inhalation Treatment Q4H WAKE     Frequency: Q4H WAKE     Number of Occurrences: Until Specified    Oxygen Continuous     Frequency: Continuous     Number of Occurrences: Until Specified     Order Questions:      Device type: High flow      Device: High Flow Nasal Cannula  (6 -15 Liters)      LPM: 8      Titrate O2 per Oxygen Titration Protocol: Yes      To maintain SpO2 goal of: >= 90%      Notify MD of: Inability to achieve desired SpO2; Sudden change in patient status and requires 20% increase in FiO2; Patient requires >60% FiO2       RECOMMENDATIONS    We recommend: RRT Recs: Continue POC per primary team.      FOLLOW-UP    Please call back the Rapid Response RT, Keo Castro, RRT at x [phone] for any questions or concerns.

## 2023-12-25 NOTE — ASSESSMENT & PLAN NOTE
20-30lb weight loss, will ask nutrition to see pt. Lung Ct neg, will consider other cancer screening. Pt complains of early saiety, no GI symptoms. Dcreased appetite.

## 2023-12-25 NOTE — ED PROVIDER NOTES
Encounter Date: 12/25/2023       History     Chief Complaint   Patient presents with    Shortness of Breath     Started this afternoon. 1L NC at baseline. Upon EMS arrival was 88% on 3L     73-year-old female with a past medical history of former smoking, CAD s/p stent, dyslipidemia, HTN, gout, anxiety, prev SAH (1999) COPD on home O2 (1L), mild pulmonary hypertension, stress induced reflex syncope, history of ESBL UTI, prev appendectomy now presenting with chief complaint of shortness of breath.  Patient reports that during the day yesterday she experienced worsening shortness of breath and cough.  Patient denies any hemoptysis, chest pain, fevers, productive cough, or sick contacts.  EMS reports that patient had saturation of 80% on room air requiring 3 L nasal cannula.  Albuterol neb was given by EMS in addition to 125 mg of methylprednisone.     The history is provided by the patient and the EMS personnel.     Review of patient's allergies indicates:  No Known Allergies  Past Medical History:   Diagnosis Date    Allergic rhinitis     Amblyopia     Carotid stenosis     Coronary atherosclerosis     Outside [facility] 6/2014: 20% mLAD, 50% mCx, 20%, mRCA    Dyslipidemia     ESBL (extended spectrum beta-lactamase) producing bacteria infection     UTI    Hypertension     Nausea and vomiting 05/26/2017    Pulmonary emphysema 10/28/2023    SAH (subarachnoid hemorrhage) 08/24/2016 1999, s/p clipping    Subarachnoid hemorrhage due to ruptured aneurysm 1999     Past Surgical History:   Procedure Laterality Date    ADENOIDECTOMY      ANTERIOR CRUCIATE LIGAMENT REPAIR  2012    APPENDECTOMY      CATARACT EXTRACTION W/  INTRAOCULAR LENS IMPLANT Right 11/07/2022    Procedure: EXTRACTION, CATARACT, WITH IOL INSERTION;  Surgeon: Higinio Cristina MD;  [facility];  Service: Ophthalmology;  Laterality: Right;    CATARACT EXTRACTION W/  INTRAOCULAR LENS IMPLANT Left 11/21/2022    Procedure: EXTRACTION, CATARACT, WITH IOL INSERTION;   Surgeon: Higinio Cristina MD;  Location: [facility] OR;  Service: Ophthalmology;  Laterality: Left;    CEREBRAL ANEURYSM REPAIR      clip    DENTAL SURGERY      EYE SURGERY Bilateral     cataract removal and lens implants    HIP ARTHROPLASTY Left 2023    Procedure: TOTAL HIP ARTHROPLASTY;  Surgeon: Juan Wan MD;  [address];  Service: Orthopedics;  Laterality: Left;    TONSILLECTOMY       Family History   Problem Relation Age of Onset    Hypertension Mother     Aneurysm Mother     Hypertension Father     Alcohol abuse Father     Kidney disease Brother     Heart disease Brother     Gout Brother     No Known Problems Daughter     No Known Problems Daughter     No Known Problems Daughter      Social History     Tobacco Use    Smoking status: Former     Current packs/day: 0.00     Average packs/day: 0.5 packs/day for 20.0 years (10.0 ttl pk-yrs)     Types: Cigarettes     Start date: 1979     Quit date: 1999     Years since quittin.9     Passive exposure: Past    Smokeless tobacco: Never   Substance Use Topics    Alcohol use: Yes     Alcohol/week: 7.0 standard drinks of alcohol     Types: 7 Glasses of wine per week     Comment: One glass of Red wine prior to dinner    Drug use: No     Review of Systems  See HPI    Physical Exam     Initial Vitals [23 0346]   BP Pulse Resp Temp SpO2   (!) 170/100 (!) 116 (!) 22 98.5 °F (36.9 °C) (!) 93 %      MAP       --         Physical Exam    Nursing note and vitals reviewed.      Gen: AxOx3, well nourished, appears stated age, no pallor, no jaundice, appears well hydrated  Eye: EOMI, no scleral icterus, no periorbital edema or ecchymosis  Head: Normocephalic, atraumatic, no lesions, scalp appears normal  ENT: Neck supple, no stridor, no masses, no drooling or voice changes  CVS: All distal pulses intact with normal rate and rhythm, no JVD, normal S1/S2, no murmur  Pulm: Increased work of breathing with bilateral biphasic wheeze and  bibasilar crackles.  Abd:  Nondistended, soft, nontender, no organomegaly, no CVAT  Ext: No edema, no signs of DVT, no lesions, rashes, or deformity  Neuro: GCS15, moving all extremities, gait intact, face grossly symmetric  Psych: normal affect, cooperative, well groomed, makes good eye contact      ED Course   Procedures  Labs Reviewed   CBC W/ AUTO DIFFERENTIAL - Abnormal; Notable for the following components:       Result Value    WBC 31.65 (*)     RBC 3.06 (*)     Hemoglobin 10.7 (*)     Hematocrit 33.1 (*)      (*)     MCH 35.0 (*)     RDW 18.2 (*)     Gran % 90.5 (*)     Lymph % 3.0 (*)     Mono % 2.0 (*)     All other components within normal limits   COMPREHENSIVE METABOLIC PANEL - Abnormal; Notable for the following components:    Sodium 133 (*)     Potassium 2.9 (*)     CO2 20 (*)     Glucose 134 (*)     Calcium 8.5 (*)     Albumin 2.6 (*)     All other components within normal limits   B-TYPE NATRIURETIC PEPTIDE - Abnormal; Notable for the following components:     (*)     All other components within normal limits   SARS-COV2 (COVID) WITH FLU/RSV BY PCR - Abnormal; Notable for the following components:    Influenza A, Molecular Detected (*)     All other components within normal limits   ISTAT PROCEDURE - Abnormal; Notable for the following components:    POC PCO2 33.0 (*)     POC PO2 36 (*)     POC HCO3 21.7 (*)     POC BE -3 (*)     POC TCO2 23 (*)     All other components within normal limits   CULTURE, BLOOD   CULTURE, BLOOD   TROPONIN I     EKG Readings: (Independently Interpreted)   Initial Reading: No STEMI.   sinus tachycardia with rate one hundred ten with T-wave inversions noted in lateral leads consistent with previous EKGs without ST depressions or elevations           Imaging Results              X-Ray Chest AP Portable (Final result)  Result time 12/25/23 06:12:25      Final result by Rich Anderson MD (12/25/23 06:12:25)                   Impression:      Patchy right  basilar consolidation may relate to infection/pneumonia or aspiration.  Clinical correlation and continued imaging follow-up is recommended.      Electronically signed by: Rich Anderson MD  Date:    12/25/2023  Time:    06:12               Narrative:    EXAMINATION:  XR CHEST AP PORTABLE    CLINICAL HISTORY:  Shortness of breath    TECHNIQUE:  Single frontal view of the chest was performed.    COMPARISON:  11/15/2023    FINDINGS:  Cardiac silhouette is stable in size and configuration.  There is atherosclerotic calcification of the thoracic aorta.  The lungs demonstrate chronic coarse interstitial attenuation.  There is patchy consolidation at the right lung base which may relate to infection/pneumonia or aspiration.  No significant volume of pleural fluid or pneumothorax identified.  Osseous structures demonstrate degenerative changes.                                       Medications   magnesium sulfate 2g in water 50mL IVPB (premix) (2 g Intravenous New Bag 12/25/23 0534)   azithromycin (ZITHROMAX) 500 mg in dextrose 5 % (D5W) 250 mL IVPB (Vial-Mate) (500 mg Intravenous New Bag 12/25/23 0609)   albuterol-ipratropium 2.5 mg-0.5 mg/3 mL nebulizer solution 3 mL (3 mLs Nebulization Given 12/25/23 0429)   cefTRIAXone (ROCEPHIN) 1 g in dextrose 5 % in water (D5W) 100 mL IVPB (MB+) (0 g Intravenous Stopped 12/25/23 0602)   potassium bicarbonate disintegrating tablet 25 mEq (25 mEq Oral Given 12/25/23 0609)     Medical Decision Making  Initial assessment  73-year-old female presenting with shortness of breath. Patient is able to vocalise, breathing spontaneously, hemodynamically stable, oriented, moving all 4 limbs spontaneously.  Examination consistent with biphasic wheeze with bibasilar crackles.      Differential diagnosis  COPD exacerbation  Pneumonia  CHF  Considered ACS      ED management  On presentation patient had shortness of breath with wheeze concerning for COPD exacerbation.  Patient received  methylprednisolone by EMS.  Patient is started on BiPAP with DuoNeb.  X-ray concerning for pneumonia and community-acquired pneumonia coverage started.  CBC consistent with leukocytosis with left shift.  CMP consistent with hypokalemia and potassium replaced.  Influenza positive.  EKG consistent with sinus tachycardia with rate one hundred ten with T-wave inversions noted in lateral leads consistent with previous EKGs without ST depressions or elevations.  Patient weaned from BiPAP to high-flow nasal cannula and admitted to Hospital Medicine.      Amount and/or Complexity of Data Reviewed  Labs: ordered. Decision-making details documented in ED Course.  Radiology: ordered.    Risk  Prescription drug management.    Dictation #1  MRN:[id number]  CSN:[id number]           Attending Attestation:   Physician Attestation Statement for Resident:  As the supervising MD   Physician Attestation Statement: I have personally seen and examined this patient.   I agree with the above history.  -:   As the supervising MD I agree with the above PE.     As the supervising MD I agree with the above treatment, course, plan, and disposition.    I have reviewed and agree with the residents interpretation of the following: lab data, x-rays and EKG.  I have reviewed the following: old records at this facility.                ED Course as of 12/25/23 0618   Mon Dec 25, 2023   0416 BP(!): 170/100 [PM]   0417 Temp: 98.5 °F (36.9 °C) [PM]   0417 SpO2(!): 93 % [PM]   0417 Pulse(!): 116 [PM]   0417 Resp(!): 22 [PM]   0539 WBC(!): 31.65 [PM]   0539 Hemoglobin(!): 10.7 [PM]   0544 Potassium(!): 2.9  Replacing [PM]   0544 BNP(!): 485 [PM]   0616 Influenza A, Molecular(!): Detected [PM]   0616 Troponin I: 0.015 [PM]   0616 BNP(!): 485 [PM]      ED Course User Index  [PM] Luis Becerra MD                             Clinical Impression:  Final diagnoses:  [R06.02] Shortness of breath  [J44.1] COPD exacerbation (Primary)  [R09.02] Hypoxia  [J10.1]  Influenza A          ED Disposition Condition    Observation Stable                Luis Becerra MD  Resident  12/25/23 0619       Alyssia Marie MD  12/27/23 0709

## 2023-12-25 NOTE — DISCHARGE INSTRUCTIONS
Please take Tamiflu once the evening of 12/29.  We are prescribe steroids for 6 days total at home. Starting Tomorrow: Please take 20 mg for 2 days, 10 mg for 2 days, then 5 mg for 2 days; your last dose should be the 1/4/24. For next 6 days please take Lasix everyday.  Please take Augmentin every 12 hours. Your last dose will be the evening of 12/31.  Please take mucinex as needed, every 12 hours for chest congestion. Please continue to use your spirometer at home, ideally once an hour.     We are not making changes to your long term medication. We are adding lasix 20mg to help you urinate excess fluids. Please take it as needed for leg swelling, SOB, or weight gain of 3lbs in 24 hours or 5 lbs in 48 hours. Please weight yourself daily.     Please follow up with Cardiology for your heart failure (known as heart failure with preserved ejection fraction). We are also sending your home with a 30 day event monitor for your noted arrhythmia on telemetry.      Please follow up with Pulmonology for your lung disease and medication optimization. We are sending you home with a nebulizer and mask (and medication for it) Please use it if you get SOB and wheezing. Please continue to use oxygen at home as needed. Aim for an O2 sat of 90-95.    Please follow up with GI the burning sensation we discussed.     Please follow up with your PCP    We are sending you home with Home Health for PT/OT/RT/Wound care and help with medications.     Please go to [address] [place] [address]  at discharge to get your nebulizer equipment. Their phone number is [phone]     It was a pleasure taking care of you, I hope you are feeling better,    Dr. Alejo Bonilla, Dr. Salinas,  & Dr. Hernández  [facility] Medicine Team 3

## 2023-12-25 NOTE — ASSESSMENT & PLAN NOTE
Patient with known CAD. Will continue Statin, ASA at home for primary prevention will cosnider holding. SAMPSON and some chest discomfort with exertion, will conisder stress test

## 2023-12-25 NOTE — SUBJECTIVE & OBJECTIVE
Past Medical History:   Diagnosis Date    Allergic rhinitis     Amblyopia     Carotid stenosis     Coronary atherosclerosis     Outside [facility] 6/2014: 20% mLAD, 50% mCx, 20%, mRCA    Dyslipidemia     ESBL (extended spectrum beta-lactamase) producing bacteria infection     UTI    Hypertension     Nausea and vomiting 05/26/2017    Pulmonary emphysema 10/28/2023    SAH (subarachnoid hemorrhage) 08/24/2016 1999, s/p clipping    Subarachnoid hemorrhage due to ruptured aneurysm 1999       Past Surgical History:   Procedure Laterality Date    ADENOIDECTOMY      ANTERIOR CRUCIATE LIGAMENT REPAIR  2012    APPENDECTOMY      CATARACT EXTRACTION W/  INTRAOCULAR LENS IMPLANT Right 11/07/2022    Procedure: EXTRACTION, CATARACT, WITH IOL INSERTION;  Surgeon: Higinio Cristina MD;  Location: [facility];  Service: Ophthalmology;  Laterality: Right;    CATARACT EXTRACTION W/  INTRAOCULAR LENS IMPLANT Left 11/21/2022    Procedure: EXTRACTION, CATARACT, WITH IOL INSERTION;  Surgeon: Higinio Cristina MD;  Location: [facility];  Service: Ophthalmology;  Laterality: Left;    CEREBRAL ANEURYSM REPAIR  1999    clip    DENTAL SURGERY      EYE SURGERY Bilateral     cataract removal and lens implants    HIP ARTHROPLASTY Left 05/28/2023    Procedure: TOTAL HIP ARTHROPLASTY;  Surgeon: Juan Wan MD;  Location: [address];  Service: Orthopedics;  Laterality: Left;    TONSILLECTOMY         Review of patient's allergies indicates:  No Known Allergies    Current Facility-Administered Medications on File Prior to Encounter   Medication    mupirocin 2 % ointment    tranexamic acid (CYKLOKAPRON) 1,000 mg in sodium chloride 0.9 % 100 mL IVPB (MB+)    tranexamic acid (CYKLOKAPRON) 1,000 mg in sodium chloride 0.9 % 100 mL IVPB (MB+)     Current Outpatient Medications on File Prior to Encounter   Medication Sig    acetaminophen (TYLENOL) 650 MG TbSR Take 1 tablet (650 mg total) by mouth every 6 to 8 hours as needed (pain (mild-moderate)).     allopurinoL (ZYLOPRIM) 100 MG tablet Take 2 tablets (200 mg total) by mouth once daily.    amLODIPine (NORVASC) 5 MG tablet TAKE 1 TABLET BY MOUTH EVERY DAY    aspirin (ECOTRIN) 81 MG EC tablet Take 81 mg by mouth once daily.    atorvastatin (LIPITOR) 80 MG tablet TAKE 1 TABLET BY MOUTH EVERY DAY    carvediloL (COREG) 6.25 MG tablet Take 1 tablet (6.25 mg total) by mouth 2 (two) times daily with meals.    celecoxib (CELEBREX) 200 MG capsule Take 1 capsule (200 mg total) by mouth as needed.    colchicine, gout, (COLCRYS) 0.6 mg tablet Take 1 tablet (0.6 mg total) by mouth once daily. As needed for gout    COMIRNATY 2023-24, 12Y UP,,PF, 30 mcg/0.3 mL inection     FLUAD QUAD 2023-24,65Y UP,,PF, 60 mcg (15 mcg x 4)/0.5 mL Syrg     fluticasone furoate-vilanteroL (BREO) 100-25 mcg/dose diskus inhaler Inhale 1 puff into the lungs once daily. Controller    fluticasone propionate (FLONASE) 50 mcg/actuation nasal spray SPRAY 2 pumps INTO EACH NOSTRIL ONCE DAILY    folic acid (FOLVITE) 1 MG tablet Take 1 tablet (1 mg total) by mouth once daily.    hydroCHLOROthiazide (HYDRODIURIL) 25 MG tablet Take 1 tablet (25 mg total) by mouth once daily.    melatonin (MELATIN) 3 mg tablet Take 2 tablets (6 mg total) by mouth nightly as needed for Insomnia.    montelukast (SINGULAIR) 10 mg tablet Take 1 tablet (10 mg total) by mouth every evening.    multivitamin (THERAGRAN) tablet Take 1 tablet by mouth once daily.    ORTHOVISC 30 mg/2 mL     pantoprazole (PROTONIX) 40 MG tablet Take 1 tablet (40 mg total) by mouth once daily.    prenatal no122/iron/folic acid (PRENATAL MULTI ORAL) Take 1 tablet by mouth once daily.    QUEtiapine (SEROQUEL) 50 MG tablet Take 1 tablet (50 mg total) by mouth every evening.    tiotropium bromide (SPIRIVA RESPIMAT) 2.5 mcg/actuation inhaler Inhale 2 puffs into the lungs once daily. Controller    VENOFER 100 mg iron/5 mL injection      Family History       Problem Relation (Age of Onset)    Alcohol abuse Father     Aneurysm Mother    Gout Brother    Heart disease Brother    Hypertension Mother, Father    Kidney disease Brother    No Known Problems Daughter, Daughter, Daughter          Tobacco Use    Smoking status: Former     Current packs/day: 0.00     Average packs/day: 0.5 packs/day for 20.0 years (10.0 ttl pk-yrs)     Types: Cigarettes     Start date: 1979     Quit date: 1999     Years since quittin.9     Passive exposure: Past    Smokeless tobacco: Never   Substance and Sexual Activity    Alcohol use: Yes     Alcohol/week: 7.0 standard drinks of alcohol     Types: 7 Glasses of wine per week     Comment: One glass of Red wine prior to dinner    Drug use: No    Sexual activity: Yes     Partners: Male     ROS as per HPI    Objective:     Vital Signs (Most Recent):  Temp: 98.6 °F (37 °C) (23 0702)  Pulse: 96 (23 1059)  Resp: 20 (23 0829)  BP: 125/66 (23 0800)  SpO2: (!) 94 % (23 1059) Vital Signs (24h Range):  Temp:  [98.5 °F (36.9 °C)-98.6 °F (37 °C)] 98.6 °F (37 °C)  Pulse:  [] 96  Resp:  [19-27] 20  SpO2:  [90 %-95 %] 94 %  BP: (125-170)/() 125/66     Weight: 54 kg (119 lb)  Body mass index is 21.08 kg/m².     Physical Exam  Vitals and nursing note reviewed. Exam conducted with a chaperone present.   Constitutional:       General: She is not in acute distress.     Appearance: She is not ill-appearing, toxic-appearing or diaphoretic.   HENT:      Head: Normocephalic and atraumatic.      Right Ear: External ear normal.      Left Ear: External ear normal.      Mouth/Throat:      Mouth: Mucous membranes are moist.      Pharynx: No oropharyngeal exudate.   Eyes:      Extraocular Movements: Extraocular movements intact.      Pupils: Pupils are equal, round, and reactive to light.   Neck:      Comments: JVD  Cardiovascular:      Rate and Rhythm: Normal rate and regular rhythm.      Pulses: Normal pulses.      Heart sounds: Normal heart sounds. No murmur heard.  Pulmonary:       Effort: Respiratory distress present.      Breath sounds: No wheezing or rales.      Comments: NC in place  Decreased breath sounds RLL  Chest:      Chest wall: No tenderness.   Abdominal:      General: Abdomen is flat. Bowel sounds are normal. There is no distension.      Palpations: Abdomen is soft. There is no mass.      Tenderness: There is no abdominal tenderness.   Musculoskeletal:         General: No swelling or tenderness. Normal range of motion.      Cervical back: Normal range of motion and neck supple.      Right lower leg: No edema.      Left lower leg: No edema.      Comments: Trace edema in BLE   Skin:     General: Skin is warm and dry.      Capillary Refill: Capillary refill takes less than 2 seconds.   Neurological:      General: No focal deficit present.      Mental Status: She is alert and oriented to person, place, and time. Mental status is at baseline.   Psychiatric:         Mood and Affect: Mood normal.         Behavior: Behavior normal.               Significant Labs: All pertinent labs within the past 24 hours have been reviewed.    Significant Imaging: I have reviewed all pertinent imaging results/findings within the past 24 hours.

## 2023-12-25 NOTE — ASSESSMENT & PLAN NOTE
Chronic, controlled. Latest blood pressure and vitals reviewed-     Temp:  [98.5 °F (36.9 °C)-98.6 °F (37 °C)]   Pulse:  []   Resp:  [19-27]   BP: (125-170)/()   SpO2:  [90 %-95 %] .   Home meds for hypertension were reviewed and noted below.   Hypertension Medications               amLODIPine (NORVASC) 5 MG tablet TAKE 1 TABLET BY MOUTH EVERY DAY    carvediloL (COREG) 6.25 MG tablet Take 1 tablet (6.25 mg total) by mouth 2 (two) times daily with meals.    hydroCHLOROthiazide (HYDRODIURIL) 25 MG tablet Take 1 tablet (25 mg total) by mouth once daily.            While in the hospital, will manage blood pressure as follows; Adjust home antihypertensive regimen as follows- Continue Coreg, hold amlodipine and monitor bp in setting of sepsis with good BPs    Will utilize p.r.n. blood pressure medication only if patient's blood pressure greater than 180/110 and she develops symptoms such as worsening chest pain or shortness of breath.

## 2023-12-25 NOTE — HPI
"73-year-old female with a past medical history of former smoking, CAD, R Carotid stent, dyslipidemia, HTN, gout, anxiety, prev SAH (1999) COPD on home O2 (1L PRN), mild pulmonary hypertension, stress induced reflex syncope, history of ESBL UTI,  now presenting with chief complaint of shortness of breath.  Patient reports that yesterday during the day she experienced worsening shortness of breath and cough.  She has had SOB for weeks, possibly months, and a lingering cough since her COVID infection last month but yesterday had an acute change. Xmas eric she was not feeling well, and around 6 or 7 pm she called ambulence. After some SOB at home and 3L didn't help. Was observed by  to be satting 81 or 82.  Baseline is 94 or 95.     Patient denies any hemoptysis, fevers, or sick contacts. Admits to SAMPSON, productive gray cough, chest tightness with exertion improved with rest, leg swelling, poor exercise tolerance, and balance issues. Reports weight loss of 20-30 lbs since 6 months. Reports decreased PO intake d/t early satiety. Former Smoker, Drinks 2-3 glasses of wine daily. Denies drugs. Family history of emphysema. Vaxed for flu rsv, pna, and covid. Follows with pulm OP. Recent hospitalization 1 month ago for Covid.   claims pt "does not walk at all" and gets SOB after walking to the restroom. Uses asistance from others or cane / walker to ambulate. Claims to have balance issues. Hospitalized June 2023 for a broken hip, claims a table fell on her. She denies falls.     EMS reports that patient had saturation of 80% on room air requiring 3 L nasal cannula.  Albuterol neb was given by EMS in addition to 125 mg of methylprednisone. In the Ed at presentation her vitals were: /100 P116 RR22 98.5F O2% 93, Initally requiring Bipap, now satting 97 with 8L NC s/p breathing treatment. WBC 31.6 H &H 10.7 33.1  Na 133 K 2.9 CO2 20 Albumin 2.6  Trop WNL Lactate WNL Flu A+ PCO2 33 Po 26 PHCO3 21.7 " EKG? Sinus Tach around 115, T wave inversions V3-V5, II, III, AVF, Xray: Patchy right basilar consolidation may relate to infection/pneumonia or aspiration.  Clinical correlation and continued imaging follow-up is recommended. Procal +.     Receive Rocephin, Azithro, Mag, and Steroids in ED. Admitted to [facility] for management of Acute Hypoxic Respiratory Failure.

## 2023-12-25 NOTE — NURSING
Nurses Note -- 4 Eyes      12/25/2023   5:02 PM      Skin assessed during: Admit      [] No Altered Skin Integrity Present    []Prevention Measures Documented      [x] Yes- Altered Skin Integrity Present or Discovered   [x] LDA Added if Not in Epic (Describe Wound)   [x] New Altered Skin Integrity was Present on Admit and Documented in LDA   [x] Wound Image Taken    Wound Care Consulted? Yes    Attending Nurse:  Erika Dominguez RN/Staff Member:     BARRY Hutchinson

## 2023-12-25 NOTE — ED TRIAGE NOTES
Jessica Floyd, an 73 y.o. female presents to the ED with c/o SOB starting this afternoon. +cough, +congestion. 1L chronic NC.      Chief Complaint   Patient presents with    Shortness of Breath     Started this afternoon. 1L NC at baseline. Upon EMS arrival was 88% on 3L     Review of patient's allergies indicates:  No Known Allergies  Past Medical History:   Diagnosis Date    Allergic rhinitis     Amblyopia     Carotid stenosis     Coronary atherosclerosis     Outside [facility] 6/2014: 20% mLAD, 50% mCx, 20%, mRCA    Dyslipidemia     ESBL (extended spectrum beta-lactamase) producing bacteria infection     UTI    Hypertension     Nausea and vomiting 05/26/2017    Pulmonary emphysema 10/28/2023    SAH (subarachnoid hemorrhage) 08/24/2016 1999, s/p clipping    Subarachnoid hemorrhage due to ruptured aneurysm 1999

## 2023-12-25 NOTE — H&P
"[facility] ([address] Medicine  History & Physical    Patient Name: Jessica Floyd  MRN: [id number]  Patient Class: OP- Observation  Admission Date: 12/25/2023  Attending Physician: Earline Hernández MD   Primary Care Provider: Effie Olmedo MD         Patient information was obtained from patient, spouse/SO, EMS personnel, past medical records, and ER records.     Subjective:     Principal Problem:Acute hypoxic respiratory failure    Chief Complaint:   Chief Complaint   Patient presents with    Shortness of Breath     Started this afternoon. 1L NC at baseline. Upon EMS arrival was 88% on 3L        HPI: 73-year-old female with a past medical history of former smoking, CAD, R Carotid stent, dyslipidemia, HTN, gout, anxiety, prev SAH (1999) COPD on home O2 (1L PRN), mild pulmonary hypertension, stress induced reflex syncope, history of ESBL UTI,  now presenting with chief complaint of shortness of breath.  Patient reports that yesterday during the day she experienced worsening shortness of breath and cough.  She has had SOB for weeks, possibly months, and a lingering cough since her COVID infection last month but yesterday had an acute change. Xmas eric she was not feeling well, and around 6 or 7 pm she called ambulence. After some SOB at home and 3L didn't help. Was observed by  to be satting 81 or 82.  Baseline is 94 or 95.     Patient denies any hemoptysis, fevers, or sick contacts. Admits to SAMPSON, productive gray cough, chest tightness with exertion improved with rest, leg swelling, poor exercise tolerance, and balance issues. Reports weight loss of 20-30 lbs since 6 months. Reports decreased PO intake d/t early satiety. Former Smoker, Drinks 2-3 glasses of wine daily. Denies drugs. Family history of emphysema. Vaxed for flu rsv, pna, and covid. Follows with pulm OP. Recent hospitalization 1 month ago for Covid.   claims pt "does not walk at all" and gets SOB after walking " to the restroom. Uses asistance from others or cane / walker to ambulate. Claims to have balance issues. Hospitalized June 2023 for a broken hip, claims a table fell on her. She denies falls.     EMS reports that patient had saturation of 80% on room air requiring 3 L nasal cannula.  Albuterol neb was given by EMS in addition to 125 mg of methylprednisone. In the Ed at presentation her vitals were: /100 P116 RR22 98.5F O2% 93, Initally requiring Bipap, now satting 97 with 8L NC s/p breathing treatment. WBC 31.6 H &H 10.7 33.1  Na 133 K 2.9 CO2 20 Albumin 2.6  Trop WNL Lactate WNL Flu A+ PCO2 33 Po 26 PHCO3 21.7 EKG? Sinus Tach around 115, T wave inversions V3-V5, II, III, AVF, Xray: Patchy right basilar consolidation may relate to infection/pneumonia or aspiration.  Clinical correlation and continued imaging follow-up is recommended. Procal +.     Receive Rocephin, Azithro, Mag, and Steroids in ED. Admitted to [facility] for management of Acute Hypoxic Respiratory Failure.     Past Medical History:   Diagnosis Date    Allergic rhinitis     Amblyopia     Carotid stenosis     Coronary atherosclerosis     Outside [facility] 6/2014: 20% mLAD, 50% mCx, 20%, mRCA    Dyslipidemia     ESBL (extended spectrum beta-lactamase) producing bacteria infection     UTI    Hypertension     Nausea and vomiting 05/26/2017    Pulmonary emphysema 10/28/2023    SAH (subarachnoid hemorrhage) 08/24/2016 1999, s/p clipping    Subarachnoid hemorrhage due to ruptured aneurysm 1999       Past Surgical History:   Procedure Laterality Date    ADENOIDECTOMY      ANTERIOR CRUCIATE LIGAMENT REPAIR  2012    APPENDECTOMY      CATARACT EXTRACTION W/  INTRAOCULAR LENS IMPLANT Right 11/07/2022    Procedure: EXTRACTION, CATARACT, WITH IOL INSERTION;  Surgeon: Higinio Cristina MD;  Location: [facility] OR;  Service: Ophthalmology;  Laterality: Right;    CATARACT EXTRACTION W/  INTRAOCULAR LENS IMPLANT Left 11/21/2022    Procedure: EXTRACTION, CATARACT,  WITH IOL INSERTION;  Surgeon: Higinio Cristina MD;  Location: [facility] OR;  Service: Ophthalmology;  Laterality: Left;    CEREBRAL ANEURYSM REPAIR  1999    clip    DENTAL SURGERY      EYE SURGERY Bilateral     cataract removal and lens implants    HIP ARTHROPLASTY Left 05/28/2023    Procedure: TOTAL HIP ARTHROPLASTY;  Surgeon: Juan Wan MD;  Location: [facility] OR 2ND FLR;  Service: Orthopedics;  Laterality: Left;    TONSILLECTOMY         Review of patient's allergies indicates:  No Known Allergies    Current Facility-Administered Medications on File Prior to Encounter   Medication    mupirocin 2 % ointment    tranexamic acid (CYKLOKAPRON) 1,000 mg in sodium chloride 0.9 % 100 mL IVPB (MB+)    tranexamic acid (CYKLOKAPRON) 1,000 mg in sodium chloride 0.9 % 100 mL IVPB (MB+)     Current Outpatient Medications on File Prior to Encounter   Medication Sig    acetaminophen (TYLENOL) 650 MG TbSR Take 1 tablet (650 mg total) by mouth every 6 to 8 hours as needed (pain (mild-moderate)).    allopurinoL (ZYLOPRIM) 100 MG tablet Take 2 tablets (200 mg total) by mouth once daily.    amLODIPine (NORVASC) 5 MG tablet TAKE 1 TABLET BY MOUTH EVERY DAY    aspirin (ECOTRIN) 81 MG EC tablet Take 81 mg by mouth once daily.    atorvastatin (LIPITOR) 80 MG tablet TAKE 1 TABLET BY MOUTH EVERY DAY    carvediloL (COREG) 6.25 MG tablet Take 1 tablet (6.25 mg total) by mouth 2 (two) times daily with meals.    celecoxib (CELEBREX) 200 MG capsule Take 1 capsule (200 mg total) by mouth as needed.    colchicine, gout, (COLCRYS) 0.6 mg tablet Take 1 tablet (0.6 mg total) by mouth once daily. As needed for gout    COMIRNATY 2023-24, 12Y UP,,PF, 30 mcg/0.3 mL inection     FLUAD QUAD 2023-24,65Y UP,,PF, 60 mcg (15 mcg x 4)/0.5 mL Syrg     fluticasone furoate-vilanteroL (BREO) 100-25 mcg/dose diskus inhaler Inhale 1 puff into the lungs once daily. Controller    fluticasone propionate (FLONASE) 50 mcg/actuation nasal spray SPRAY 2 pumps INTO EACH  NOSTRIL ONCE DAILY    folic acid (FOLVITE) 1 MG tablet Take 1 tablet (1 mg total) by mouth once daily.    hydroCHLOROthiazide (HYDRODIURIL) 25 MG tablet Take 1 tablet (25 mg total) by mouth once daily.    melatonin (MELATIN) 3 mg tablet Take 2 tablets (6 mg total) by mouth nightly as needed for Insomnia.    montelukast (SINGULAIR) 10 mg tablet Take 1 tablet (10 mg total) by mouth every evening.    multivitamin (THERAGRAN) tablet Take 1 tablet by mouth once daily.    ORTHOVISC 30 mg/2 mL     pantoprazole (PROTONIX) 40 MG tablet Take 1 tablet (40 mg total) by mouth once daily.    prenatal no122/iron/folic acid (PRENATAL MULTI ORAL) Take 1 tablet by mouth once daily.    QUEtiapine (SEROQUEL) 50 MG tablet Take 1 tablet (50 mg total) by mouth every evening.    tiotropium bromide (SPIRIVA RESPIMAT) 2.5 mcg/actuation inhaler Inhale 2 puffs into the lungs once daily. Controller    VENOFER 100 mg iron/5 mL injection      Family History       Problem Relation (Age of Onset)    Alcohol abuse Father    Aneurysm Mother    Gout Brother    Heart disease Brother    Hypertension Mother, Father    Kidney disease Brother    No Known Problems Daughter, Daughter, Daughter          Tobacco Use    Smoking status: Former     Current packs/day: 0.00     Average packs/day: 0.5 packs/day for 20.0 years (10.0 ttl pk-yrs)     Types: Cigarettes     Start date: 1979     Quit date: 1999     Years since quittin.9     Passive exposure: Past    Smokeless tobacco: Never   Substance and Sexual Activity    Alcohol use: Yes     Alcohol/week: 7.0 standard drinks of alcohol     Types: 7 Glasses of wine per week     Comment: One glass of Red wine prior to dinner    Drug use: No    Sexual activity: Yes     Partners: Male     ROS as per HPI    Objective:     Vital Signs (Most Recent):  Temp: 98.6 °F (37 °C) (23 0702)  Pulse: 96 (23 1059)  Resp: 20 (23 0829)  BP: 125/66 (23 0800)  SpO2: (!) 94 % (23 1059) Vital Signs  (24h Range):  Temp:  [98.5 °F (36.9 °C)-98.6 °F (37 °C)] 98.6 °F (37 °C)  Pulse:  [] 96  Resp:  [19-27] 20  SpO2:  [90 %-95 %] 94 %  BP: (125-170)/() 125/66     Weight: 54 kg (119 lb)  Body mass index is 21.08 kg/m².     Physical Exam  Vitals and nursing note reviewed. Exam conducted with a chaperone present.   Constitutional:       General: She is not in acute distress.     Appearance: She is not ill-appearing, toxic-appearing or diaphoretic.   HENT:      Head: Normocephalic and atraumatic.      Right Ear: External ear normal.      Left Ear: External ear normal.      Mouth/Throat:      Mouth: Mucous membranes are moist.      Pharynx: No oropharyngeal exudate.   Eyes:      Extraocular Movements: Extraocular movements intact.      Pupils: Pupils are equal, round, and reactive to light.   Neck:      Comments: JVD  Cardiovascular:      Rate and Rhythm: Normal rate and regular rhythm.      Pulses: Normal pulses.      Heart sounds: Normal heart sounds. No murmur heard.  Pulmonary:      Effort: Respiratory distress present.      Breath sounds: No wheezing or rales.      Comments: NC in place  Decreased breath sounds RLL  Chest:      Chest wall: No tenderness.   Abdominal:      General: Abdomen is flat. Bowel sounds are normal. There is no distension.      Palpations: Abdomen is soft. There is no mass.      Tenderness: There is no abdominal tenderness.   Musculoskeletal:         General: No swelling or tenderness. Normal range of motion.      Cervical back: Normal range of motion and neck supple.      Right lower leg: No edema.      Left lower leg: No edema.      Comments: Trace edema in BLE   Skin:     General: Skin is warm and dry.      Capillary Refill: Capillary refill takes less than 2 seconds.   Neurological:      General: No focal deficit present.      Mental Status: She is alert and oriented to person, place, and time. Mental status is at baseline.   Psychiatric:         Mood and Affect: Mood normal.     "     Behavior: Behavior normal.               Significant Labs: All pertinent labs within the past 24 hours have been reviewed.    Significant Imaging: I have reviewed all pertinent imaging results/findings within the past 24 hours.  Assessment/Plan:     * Acute hypoxic respiratory failure  Patient with Hypoxic Respiratory failure which is Acute on chronic.  she is on home oxygen at 1L PRN LPM. Supplemental oxygen was provided and noted- Oxygen Concentration (%):  [30] 30 Signs/symptoms of respiratory failure include- tachypnea, increased work of breathing, respiratory distress, and wheezing. Contributing diagnoses includes - Aspiration, CHF, COPD, and Pneumonia Labs and images were reviewed. Patient Has not had a recent ABG. Will treat underlying causes and adjust management of respiratory failure as follows    73-year-old female with a past medical history of former smoking, CAD, R Carotid stent, dyslipidemia, HTN, gout, anxiety, prev SAH (1999) COPD on home O2 (1L PRN), mild pulmonary hypertension, stress induced reflex syncope, history of ESBL UTI,  now presenting with chief complaint of shortness of breath.  Patient reports that yesterday during the day she experienced worsening shortness of breath and cough.  She has had SOB for weeks, possibly months, and a lingering cough since her COVID infection last month but yesterday had an acute change. Xmas eric she was not feeling well, and around 6 or 7 pm she called ambulence. After some SOB at home and 3L didn't help. Was observed by  to be satting 81 or 82.  Baseline is 94 or 95.  Patient denies any hemoptysis, fevers, or sick contacts. Admits to SAMPSON, productive gray cough, chest tightness with exertion improved with rest, leg swelling, poor exercise tolerance, and balance issues. Reports weight loss of 20-30 lbs since 6 months. Vaxed for flu rsv, pna, and covid. Follows with pulm OP. Recent hospitalization 1 month ago for Covid.   claims pt "does " "not walk at all" and gets SOB after walking to the restroom. Uses asistance from others or cane / walker to ambulate.     EMS reports that patient had saturation of 80% on room air requiring 3 L nasal cannula.  Albuterol neb was given by EMS in addition to 125 mg of methylprednisone. In the Ed at presentation her vitals were: /100 P116 RR22 98.5F O2% 93, Initally requiring Bipap, now satting 97 with 8L NC s/p breathing treatment. WBC 31.6 H &H 10.7 33.1  Na 133 K 2.9 CO2 20 Albumin 2.6  Trop WNL Lactate WNL Flu A+ PCO2 33 Po 26 PHCO3 21.7 EKG? Sinus Tach around 115, T wave inversions V3-V5, II, III, AVF, Xray: Patchy right basilar consolidation may relate to infection/pneumonia or aspiration.  Clinical correlation and continued imaging follow-up is recommended. Procal +.     Receive Rocephin, Azithro, Mag, and Steroids in ED. Admitted to [facility] for management of Acute Hypoxic Respiratory Failure.       Plan  Q4Hr Breathing treatments  CAP coverage with Ceftriaxone and Azithro  Tamiflu for influenza   Lasix for volume overload 2/2 HFpEF  Continue home inhalers  Prednisone 40 mg qd  Ween O2 as needed      Weight loss  20-30lb weight loss, will ask nutrition to see pt. Lung Ct neg, will consider other cancer screening. Pt complains of early saiety, no GI symptoms. Dcreased appetite.     Sepsis  This patient does have evidence of infective focus  My overall impression is sepsis.  Source: Respiratory  Antibiotics given-   Antibiotics (72h ago, onward)      Start     Stop Route Frequency Ordered    12/26/23 0900  azithromycin tablet 500 mg         12/28/23 0859 Oral Daily 12/25/23 0910    12/26/23 0600  cefTRIAXone (ROCEPHIN) 1 g in dextrose 5 % in water (D5W) 100 mL IVPB (MB+)         -- IV Every 24 hours (non-standard times) 12/25/23 0910          Latest lactate reviewed-  Recent Labs   Lab 12/25/23  0655   LACTATE 0.9     Organ dysfunction indicated by Acute respiratory failure    Fluid challenge Not " needed - patient is not hypotensive      Post- resuscitation assessment No - Post resuscitation assessment not needed       Will Not start Pressors- Levophed for MAP of 65  Source control achieved by: Abx    Ceftriaxone and Azithro and Tamiflu. Ua and blood ctx pending      Pulmonary emphysema  Patient's COPD is with exacerbation noted by continued dyspnea and worsening of baseline hypoxia currently.  Patient is currently off COPD Pathway. Continue scheduled inhalers Steroids, Antibiotics, and Supplemental oxygen and monitor respiratory status closely.     Gout  Continue home meds      Hypertension  Chronic, controlled. Latest blood pressure and vitals reviewed-     Temp:  [98.5 °F (36.9 °C)-98.6 °F (37 °C)]   Pulse:  []   Resp:  [19-27]   BP: (125-170)/()   SpO2:  [90 %-95 %] .   Home meds for hypertension were reviewed and noted below.   Hypertension Medications               amLODIPine (NORVASC) 5 MG tablet TAKE 1 TABLET BY MOUTH EVERY DAY    carvediloL (COREG) 6.25 MG tablet Take 1 tablet (6.25 mg total) by mouth 2 (two) times daily with meals.    hydroCHLOROthiazide (HYDRODIURIL) 25 MG tablet Take 1 tablet (25 mg total) by mouth once daily.            While in the hospital, will manage blood pressure as follows; Adjust home antihypertensive regimen as follows- Continue Coreg, hold amlodipine and monitor bp in setting of sepsis with good BPs    Will utilize p.r.n. blood pressure medication only if patient's blood pressure greater than 180/110 and she develops symptoms such as worsening chest pain or shortness of breath.    Gastroesophageal reflux disease without esophagitis  Continue home meds        Dyslipidemia  Continue home meds        Coronary artery disease involving native coronary artery of native heart without angina pectoris  Patient with known CAD. Will continue Statin, ASA at home for primary prevention will cosnider holding. SAMPSON and some chest discomfort with exertion, will conisder  stress test      VTE Risk Mitigation (From admission, onward)           Ordered     IP VTE HIGH RISK PATIENT  Once         12/25/23 0634     Place sequential compression device  Until discontinued         12/25/23 0634                              Alejo Bonilla DO  Department of Hospital Medicine  [facility] - Intensive Care ([place] [address])                      12/25/2023                             STAFF PHYSICIAN NOTE                                   Attending Attestation for Rounds with Resident  I have reviewed and concur with the resident's history, physical, assessment, and plan.  I have personally interviewed and examined the patient at bedside and agree with the resident's findings.                                    72yo F, CAD, Right carotid stent, Gout, Anxiety, SAH 1999, on home O2 1 liter NC, Hx of ESBL UTI, Anemia, COVID 11/2023, urinary incontinence,  coming in for pneumonia complicated by COPD exacerbation. Has pulm HTN. Former smoker, drinks wine daily. Acute resp. failure & SEPSIS upon arrival. + FLU A. Initially on BiPAP now transitioning to NC.received duonebs, methylpred by EMS, ceftriaxone,Tamilflu,  and azithro. Continuing duonebs, steroids.      Earline Hernández MD  Senior Hospitalist  Department of Hospital Medicine  [facility]  [id number], [phone]

## 2023-12-26 ENCOUNTER — DOCUMENT SCAN (OUTPATIENT)
Dept: HOME HEALTH SERVICES | Facility: HOSPITAL | Age: 73
End: 2023-12-26
Payer: MEDICARE

## 2023-12-26 PROBLEM — E44.0 MODERATE MALNUTRITION: Status: ACTIVE | Noted: 2023-12-26

## 2023-12-26 LAB
ALBUMIN SERPL BCP-MCNC: 2.5 G/DL (ref 3.5–5.2)
ALBUMIN SERPL ELPH-MCNC: 2.85 G/DL (ref 3.35–5.55)
ALP SERPL-CCNC: 102 U/L (ref 55–135)
ALPHA1 GLOB SERPL ELPH-MCNC: 0.6 G/DL (ref 0.17–0.41)
ALPHA2 GLOB SERPL ELPH-MCNC: 0.92 G/DL (ref 0.43–0.99)
ALT SERPL W/O P-5'-P-CCNC: 14 U/L (ref 10–44)
ANION GAP SERPL CALC-SCNC: 11 MMOL/L (ref 8–16)
ANISOCYTOSIS BLD QL SMEAR: SLIGHT
AST SERPL-CCNC: 22 U/L (ref 10–40)
B-GLOBULIN SERPL ELPH-MCNC: 0.86 G/DL (ref 0.5–1.1)
BACTERIA UR CULT: NO GROWTH
BASOPHILS # BLD AUTO: 0.04 K/UL (ref 0–0.2)
BASOPHILS NFR BLD: 0.1 % (ref 0–1.9)
BILIRUB SERPL-MCNC: 0.6 MG/DL (ref 0.1–1)
BUN SERPL-MCNC: 19 MG/DL (ref 8–23)
CALCIUM SERPL-MCNC: 9.1 MG/DL (ref 8.7–10.5)
CHLORIDE SERPL-SCNC: 97 MMOL/L (ref 95–110)
CO2 SERPL-SCNC: 29 MMOL/L (ref 23–29)
CREAT SERPL-MCNC: 0.8 MG/DL (ref 0.5–1.4)
DIFFERENTIAL METHOD BLD: ABNORMAL
EOSINOPHIL # BLD AUTO: 0 K/UL (ref 0–0.5)
EOSINOPHIL NFR BLD: 0.1 % (ref 0–8)
ERYTHROCYTE [DISTWIDTH] IN BLOOD BY AUTOMATED COUNT: 18.4 % (ref 11.5–14.5)
EST. GFR  (NO RACE VARIABLE): >60 ML/MIN/1.73 M^2
GAMMA GLOB SERPL ELPH-MCNC: 1.16 G/DL (ref 0.67–1.58)
GLUCOSE SERPL-MCNC: 121 MG/DL (ref 70–110)
HCT VFR BLD AUTO: 32.7 % (ref 37–48.5)
HGB BLD-MCNC: 10.6 G/DL (ref 12–16)
IMM GRANULOCYTES # BLD AUTO: 1.25 K/UL (ref 0–0.04)
IMM GRANULOCYTES NFR BLD AUTO: 4.6 % (ref 0–0.5)
INTERPRETATION SERPL IFE-IMP: NORMAL
KAPPA LC SER QL IA: 8.17 MG/DL (ref 0.33–1.94)
KAPPA LC/LAMBDA SER IA: 1.35 (ref 0.26–1.65)
LAMBDA LC SER QL IA: 6.03 MG/DL (ref 0.57–2.63)
LYMPHOCYTES # BLD AUTO: 1.1 K/UL (ref 1–4.8)
LYMPHOCYTES NFR BLD: 4 % (ref 18–48)
MAGNESIUM SERPL-MCNC: 1.7 MG/DL (ref 1.6–2.6)
MCH RBC QN AUTO: 34.6 PG (ref 27–31)
MCHC RBC AUTO-ENTMCNC: 32.4 G/DL (ref 32–36)
MCV RBC AUTO: 107 FL (ref 82–98)
MONOCYTES # BLD AUTO: 2.5 K/UL (ref 0.3–1)
MONOCYTES NFR BLD: 9 % (ref 4–15)
NEUTROPHILS # BLD AUTO: 22.6 K/UL (ref 1.8–7.7)
NEUTROPHILS NFR BLD: 82.2 % (ref 38–73)
NRBC BLD-RTO: 0 /100 WBC
OVALOCYTES BLD QL SMEAR: ABNORMAL
PATHOLOGIST INTERPRETATION IFE: NORMAL
PATHOLOGIST INTERPRETATION SPE: NORMAL
PHOSPHATE SERPL-MCNC: 2.8 MG/DL (ref 2.7–4.5)
PLATELET # BLD AUTO: 359 K/UL (ref 150–450)
PLATELET BLD QL SMEAR: ABNORMAL
PMV BLD AUTO: 10.2 FL (ref 9.2–12.9)
POIKILOCYTOSIS BLD QL SMEAR: SLIGHT
POTASSIUM SERPL-SCNC: 3.2 MMOL/L (ref 3.5–5.1)
PROT SERPL-MCNC: 6.4 G/DL (ref 6–8.4)
PROT SERPL-MCNC: 6.9 G/DL (ref 6–8.4)
RBC # BLD AUTO: 3.06 M/UL (ref 4–5.4)
SCHISTOCYTES BLD QL SMEAR: ABNORMAL
SODIUM SERPL-SCNC: 137 MMOL/L (ref 136–145)
TOXIC GRANULES BLD QL SMEAR: PRESENT
WBC # BLD AUTO: 27.47 K/UL (ref 3.9–12.7)

## 2023-12-26 PROCEDURE — 97530 THERAPEUTIC ACTIVITIES: CPT | Mod: HCNC

## 2023-12-26 PROCEDURE — 5A0945A ASSISTANCE WITH RESPIRATORY VENTILATION, 24-96 CONSECUTIVE HOURS, HIGH NASAL FLOW/VELOCITY: ICD-10-PCS | Performed by: EMERGENCY MEDICINE

## 2023-12-26 PROCEDURE — 63700000 PHARM REV CODE 250 ALT 637 W/O HCPCS: Mod: HCNC

## 2023-12-26 PROCEDURE — 25000003 PHARM REV CODE 250: Mod: HCNC

## 2023-12-26 PROCEDURE — 97535 SELF CARE MNGMENT TRAINING: CPT | Mod: HCNC

## 2023-12-26 PROCEDURE — 99900035 HC TECH TIME PER 15 MIN (STAT): Mod: HCNC

## 2023-12-26 PROCEDURE — 80053 COMPREHEN METABOLIC PANEL: CPT | Mod: HCNC

## 2023-12-26 PROCEDURE — 25000242 PHARM REV CODE 250 ALT 637 W/ HCPCS: Mod: HCNC

## 2023-12-26 PROCEDURE — 97165 OT EVAL LOW COMPLEX 30 MIN: CPT | Mod: HCNC

## 2023-12-26 PROCEDURE — 94761 N-INVAS EAR/PLS OXIMETRY MLT: CPT | Mod: HCNC

## 2023-12-26 PROCEDURE — 97161 PT EVAL LOW COMPLEX 20 MIN: CPT | Mod: HCNC

## 2023-12-26 PROCEDURE — 94668 MNPJ CHEST WALL SBSQ: CPT | Mod: HCNC

## 2023-12-26 PROCEDURE — 63600175 PHARM REV CODE 636 W HCPCS: Mod: HCNC

## 2023-12-26 PROCEDURE — 84100 ASSAY OF PHOSPHORUS: CPT | Mod: HCNC

## 2023-12-26 PROCEDURE — 5A09357 ASSISTANCE WITH RESPIRATORY VENTILATION, LESS THAN 24 CONSECUTIVE HOURS, CONTINUOUS POSITIVE AIRWAY PRESSURE: ICD-10-PCS | Performed by: EMERGENCY MEDICINE

## 2023-12-26 PROCEDURE — 36415 COLL VENOUS BLD VENIPUNCTURE: CPT | Mod: HCNC

## 2023-12-26 PROCEDURE — 94640 AIRWAY INHALATION TREATMENT: CPT | Mod: HCNC

## 2023-12-26 PROCEDURE — 27000207 HC ISOLATION: Mod: HCNC

## 2023-12-26 PROCEDURE — 85025 COMPLETE CBC W/AUTO DIFF WBC: CPT | Mod: HCNC

## 2023-12-26 PROCEDURE — 96366 THER/PROPH/DIAG IV INF ADDON: CPT

## 2023-12-26 PROCEDURE — 83735 ASSAY OF MAGNESIUM: CPT | Mod: HCNC

## 2023-12-26 PROCEDURE — 27000221 HC OXYGEN, UP TO 24 HOURS: Mod: HCNC

## 2023-12-26 PROCEDURE — 96376 TX/PRO/DX INJ SAME DRUG ADON: CPT

## 2023-12-26 PROCEDURE — 25000242 PHARM REV CODE 250 ALT 637 W/ HCPCS: Mod: HCNC | Performed by: HOSPITALIST

## 2023-12-26 PROCEDURE — 27100171 HC OXYGEN HIGH FLOW UP TO 24 HOURS: Mod: HCNC

## 2023-12-26 PROCEDURE — 20600001 HC STEP DOWN PRIVATE ROOM: Mod: HCNC

## 2023-12-26 RX ORDER — OSELTAMIVIR PHOSPHATE 30 MG/1
30 CAPSULE ORAL 2 TIMES DAILY
Status: DISCONTINUED | OUTPATIENT
Start: 2023-12-26 | End: 2023-12-29 | Stop reason: HOSPADM

## 2023-12-26 RX ORDER — FUROSEMIDE 10 MG/ML
20 INJECTION INTRAMUSCULAR; INTRAVENOUS ONCE
Status: COMPLETED | OUTPATIENT
Start: 2023-12-26 | End: 2023-12-26

## 2023-12-26 RX ORDER — IPRATROPIUM BROMIDE AND ALBUTEROL SULFATE 2.5; .5 MG/3ML; MG/3ML
3 SOLUTION RESPIRATORY (INHALATION) EVERY 4 HOURS
Status: DISCONTINUED | OUTPATIENT
Start: 2023-12-26 | End: 2023-12-29 | Stop reason: HOSPADM

## 2023-12-26 RX ORDER — QUETIAPINE FUMARATE 25 MG/1
50 TABLET, FILM COATED ORAL ONCE
Status: COMPLETED | OUTPATIENT
Start: 2023-12-26 | End: 2023-12-26

## 2023-12-26 RX ORDER — QUETIAPINE FUMARATE 25 MG/1
50 TABLET, FILM COATED ORAL NIGHTLY
Status: DISCONTINUED | OUTPATIENT
Start: 2023-12-26 | End: 2023-12-29 | Stop reason: HOSPADM

## 2023-12-26 RX ORDER — ALUMINUM HYDROXIDE, MAGNESIUM HYDROXIDE, AND SIMETHICONE 2400; 240; 2400 MG/30ML; MG/30ML; MG/30ML
30 SUSPENSION ORAL EVERY 6 HOURS PRN
Status: DISCONTINUED | OUTPATIENT
Start: 2023-12-26 | End: 2023-12-28

## 2023-12-26 RX ORDER — IPRATROPIUM BROMIDE AND ALBUTEROL SULFATE 2.5; .5 MG/3ML; MG/3ML
3 SOLUTION RESPIRATORY (INHALATION) EVERY 4 HOURS
Status: DISCONTINUED | OUTPATIENT
Start: 2023-12-26 | End: 2023-12-26

## 2023-12-26 RX ORDER — ENOXAPARIN SODIUM 100 MG/ML
40 INJECTION SUBCUTANEOUS EVERY 24 HOURS
Status: DISCONTINUED | OUTPATIENT
Start: 2023-12-26 | End: 2023-12-29 | Stop reason: HOSPADM

## 2023-12-26 RX ORDER — GUAIFENESIN 100 MG/5ML
200 SOLUTION ORAL EVERY 4 HOURS
Status: DISCONTINUED | OUTPATIENT
Start: 2023-12-26 | End: 2023-12-29 | Stop reason: HOSPADM

## 2023-12-26 RX ORDER — ONDANSETRON 2 MG/ML
4 INJECTION INTRAMUSCULAR; INTRAVENOUS ONCE
Status: COMPLETED | OUTPATIENT
Start: 2023-12-26 | End: 2023-12-26

## 2023-12-26 RX ADMIN — ONDANSETRON 4 MG: 2 INJECTION INTRAMUSCULAR; INTRAVENOUS at 08:12

## 2023-12-26 RX ADMIN — AZITHROMYCIN DIHYDRATE 500 MG: 250 TABLET ORAL at 09:12

## 2023-12-26 RX ADMIN — QUETIAPINE FUMARATE 50 MG: 25 TABLET ORAL at 03:12

## 2023-12-26 RX ADMIN — POTASSIUM BICARBONATE 40 MEQ: 391 TABLET, EFFERVESCENT ORAL at 11:12

## 2023-12-26 RX ADMIN — GUAIFENESIN 200 MG: 200 SOLUTION ORAL at 02:12

## 2023-12-26 RX ADMIN — GUAIFENESIN 200 MG: 200 SOLUTION ORAL at 05:12

## 2023-12-26 RX ADMIN — Medication 100 MG: at 09:12

## 2023-12-26 RX ADMIN — PREDNISONE 40 MG: 20 TABLET ORAL at 09:12

## 2023-12-26 RX ADMIN — FUROSEMIDE 20 MG: 10 INJECTION, SOLUTION INTRAMUSCULAR; INTRAVENOUS at 11:12

## 2023-12-26 RX ADMIN — IPRATROPIUM BROMIDE AND ALBUTEROL SULFATE 3 ML: .5; 3 SOLUTION RESPIRATORY (INHALATION) at 03:12

## 2023-12-26 RX ADMIN — PANTOPRAZOLE SODIUM 40 MG: 40 TABLET, DELAYED RELEASE ORAL at 09:12

## 2023-12-26 RX ADMIN — FLUTICASONE FUROATE AND VILANTEROL TRIFENATATE 1 PUFF: 100; 25 POWDER RESPIRATORY (INHALATION) at 08:12

## 2023-12-26 RX ADMIN — CARVEDILOL 6.25 MG: 3.12 TABLET, FILM COATED ORAL at 09:12

## 2023-12-26 RX ADMIN — GUAIFENESIN 200 MG: 200 SOLUTION ORAL at 09:12

## 2023-12-26 RX ADMIN — ALLOPURINOL 200 MG: 100 TABLET ORAL at 09:12

## 2023-12-26 RX ADMIN — CEFTRIAXONE 1 G: 1 INJECTION, POWDER, FOR SOLUTION INTRAMUSCULAR; INTRAVENOUS at 06:12

## 2023-12-26 RX ADMIN — ENOXAPARIN SODIUM 40 MG: 40 INJECTION SUBCUTANEOUS at 05:12

## 2023-12-26 RX ADMIN — ATORVASTATIN CALCIUM 80 MG: 40 TABLET, FILM COATED ORAL at 09:12

## 2023-12-26 RX ADMIN — CARVEDILOL 6.25 MG: 3.12 TABLET, FILM COATED ORAL at 05:12

## 2023-12-26 RX ADMIN — ASPIRIN 81 MG: 81 TABLET, COATED ORAL at 09:12

## 2023-12-26 RX ADMIN — IPRATROPIUM BROMIDE AND ALBUTEROL SULFATE 3 ML: .5; 3 SOLUTION RESPIRATORY (INHALATION) at 08:12

## 2023-12-26 RX ADMIN — IPRATROPIUM BROMIDE AND ALBUTEROL SULFATE 3 ML: .5; 3 SOLUTION RESPIRATORY (INHALATION) at 12:12

## 2023-12-26 RX ADMIN — FOLIC ACID 1 MG: 1 TABLET ORAL at 09:12

## 2023-12-26 RX ADMIN — OSELTAMIVIR PHOSPHATE 75 MG: 75 CAPSULE ORAL at 09:12

## 2023-12-26 RX ADMIN — IPRATROPIUM BROMIDE AND ALBUTEROL SULFATE 3 ML: .5; 3 SOLUTION RESPIRATORY (INHALATION) at 07:12

## 2023-12-26 RX ADMIN — TIOTROPIUM BROMIDE INHALATION SPRAY 2 PUFF: 3.12 SPRAY, METERED RESPIRATORY (INHALATION) at 08:12

## 2023-12-26 NOTE — PROGRESS NOTES
"[facility] ([address] Medicine  Progress Note    Patient Name: Jessica Floyd  MRN: [id number]  Patient Class: OP- Observation   Admission Date: 12/25/2023  Length of Stay: 0 days  Attending Physician: Earline Hernández MD  Primary Care Provider: Effie Olmedo MD        Subjective:     Principal Problem:Acute hypoxic respiratory failure        HPI:  73-year-old female with a past medical history of former smoking, CAD, R Carotid stent, dyslipidemia, HTN, gout, anxiety, prev SAH (1999) COPD on home O2 (1L PRN), mild pulmonary hypertension, stress induced reflex syncope, history of ESBL UTI,  now presenting with chief complaint of shortness of breath.  Patient reports that yesterday during the day she experienced worsening shortness of breath and cough.  She has had SOB for weeks, possibly months, and a lingering cough since her COVID infection last month but yesterday had an acute change. Xmas eric she was not feeling well, and around 6 or 7 pm she called ambulence. After some SOB at home and 3L didn't help. Was observed by  to be satting 81 or 82.  Baseline is 94 or 95.     Patient denies any hemoptysis, fevers, or sick contacts. Admits to SAMPSON, productive gray cough, chest tightness with exertion improved with rest, leg swelling, poor exercise tolerance, and balance issues. Reports weight loss of 20-30 lbs since 6 months. Reports decreased PO intake d/t early satiety. Former Smoker, Drinks 2-3 glasses of wine daily. Denies drugs. Family history of emphysema. Vaxed for flu rsv, pna, and covid. Follows with pulm OP. Recent hospitalization 1 month ago for Covid.   claims pt "does not walk at all" and gets SOB after walking to the restroom. Uses asistance from others or cane / walker to ambulate. Claims to have balance issues. Hospitalized June 2023 for a broken hip, claims a table fell on her. She denies falls.     EMS reports that patient had saturation of 80% on room air " requiring 3 L nasal cannula.  Albuterol neb was given by EMS in addition to 125 mg of methylprednisone. In the Ed at presentation her vitals were: /100 P116 RR22 98.5F O2% 93, Initally requiring Bipap, now satting 97 with 8L NC s/p breathing treatment. WBC 31.6 H &H 10.7 33.1  Na 133 K 2.9 CO2 20 Albumin 2.6  Trop WNL Lactate WNL Flu A+ PCO2 33 Po 26 PHCO3 21.7 EKG? Sinus Tach around 115, T wave inversions V3-V5, II, III, AVF, Xray: Patchy right basilar consolidation may relate to infection/pneumonia or aspiration.  Clinical correlation and continued imaging follow-up is recommended. Procal +.     Receive Rocephin, Azithro, Mag, and Steroids in ED. Admitted to [facility] for management of Acute Hypoxic Respiratory Failure.     Overview/Hospital Course:  No notes on file    Interval History: No Acute Overnight Events. Patient is afebrile and hemodynamically stable. Coughing spell- received DXM and Guaf. Insomnia- Received Seroquel. Scheduling those drugs for today.     Review of Systems   Reason unable to perform ROS: as per hpi.           Objective:     Vital Signs (Most Recent):  Temp: 98.7 °F (37.1 °C) (12/26/23 0501)  Pulse: 93 (12/26/23 0501)  Resp: 20 (12/26/23 0501)  BP: 131/67 (12/26/23 0501)  SpO2: (!) 94 % (12/26/23 0345) Vital Signs (24h Range):  Temp:  [98.2 °F (36.8 °C)-98.9 °F (37.2 °C)] 98.7 °F (37.1 °C)  Pulse:  [] 93  Resp:  [14-27] 20  SpO2:  [90 %-98 %] 94 %  BP: (116-141)/(63-72) 131/67     Weight: 54 kg (119 lb)  Body mass index is 21.08 kg/m².    Intake/Output Summary (Last 24 hours) at 12/26/2023 0564  Last data filed at 12/26/2023 0532  Gross per 24 hour   Intake 87.89 ml   Output 1250 ml   Net -1162.11 ml         Physical Exam  Vitals and nursing note reviewed. Exam conducted with a chaperone present.   Constitutional:       General: She is not in acute distress.     Appearance: She is not ill-appearing, toxic-appearing or diaphoretic.   HENT:      Head: Normocephalic and  atraumatic.      Right Ear: External ear normal.      Left Ear: External ear normal.      Mouth/Throat:      Mouth: Mucous membranes are moist.      Pharynx: No oropharyngeal exudate.   Eyes:      Extraocular Movements: Extraocular movements intact.      Pupils: Pupils are equal, round, and reactive to light.   Neck:      Comments: JVD  Cardiovascular:      Rate and Rhythm: Normal rate and regular rhythm.      Pulses: Normal pulses.      Heart sounds: Normal heart sounds. No murmur heard.  Pulmonary:      Effort: Respiratory distress present.      Breath sounds: No wheezing or rales.      Comments: NC in place  Decreased breath sounds RLL  Chest:      Chest wall: No tenderness.   Abdominal:      General: Abdomen is flat. Bowel sounds are normal. There is no distension.      Palpations: Abdomen is soft. There is no mass.      Tenderness: There is no abdominal tenderness.   Musculoskeletal:         General: No swelling or tenderness. Normal range of motion.      Cervical back: Normal range of motion and neck supple.      Right lower leg: No edema.      Left lower leg: No edema.      Comments: Trace edema in BLE   Skin:     General: Skin is warm and dry.      Capillary Refill: Capillary refill takes less than 2 seconds.   Neurological:      General: No focal deficit present.      Mental Status: She is alert and oriented to person, place, and time. Mental status is at baseline.   Psychiatric:         Mood and Affect: Mood normal.         Behavior: Behavior normal.               Significant Labs: All pertinent labs within the past 24 hours have been reviewed.    Significant Imaging: I have reviewed all pertinent imaging results/findings within the past 24 hours.    Assessment/Plan:      * Acute hypoxic respiratory failure  Patient with Hypoxic Respiratory failure which is Acute on chronic.  she is on home oxygen at 1L PRN LPM. Supplemental oxygen was provided and noted-   Signs/symptoms of respiratory failure include-  "tachypnea, increased work of breathing, respiratory distress, and wheezing. Contributing diagnoses includes - Aspiration, CHF, COPD, and Pneumonia Labs and images were reviewed. Patient Has not had a recent ABG. Will treat underlying causes and adjust management of respiratory failure as follows    73-year-old female with a past medical history of former smoking, CAD, R Carotid stent, dyslipidemia, HTN, gout, anxiety, prev SAH (1999) COPD on home O2 (1L PRN), mild pulmonary hypertension, stress induced reflex syncope, history of ESBL UTI,  now presenting with chief complaint of shortness of breath.  Patient reports that yesterday during the day she experienced worsening shortness of breath and cough.  She has had SOB for weeks, possibly months, and a lingering cough since her COVID infection last month but yesterday had an acute change. Xmas eric she was not feeling well, and around 6 or 7 pm she called ambulence. After some SOB at home and 3L didn't help. Was observed by  to be satting 81 or 82.  Baseline is 94 or 95.  Patient denies any hemoptysis, fevers, or sick contacts. Admits to SAMPSON, productive gray cough, chest tightness with exertion improved with rest, leg swelling, poor exercise tolerance, and balance issues. Reports weight loss of 20-30 lbs since 6 months. Vaxed for flu rsv, pna, and covid. Follows with pulm OP. Recent hospitalization 1 month ago for Covid.   claims pt "does not walk at all" and gets SOB after walking to the restroom. Uses asistance from others or cane / walker to ambulate.     EMS reports that patient had saturation of 80% on room air requiring 3 L nasal cannula.  Albuterol neb was given by EMS in addition to 125 mg of methylprednisone. In the Ed at presentation her vitals were: /100 P116 RR22 98.5F O2% 93, Initally requiring Bipap, now satting 97 with 8L NC s/p breathing treatment. WBC 31.6 H &H 10.7 33.1  Na 133 K 2.9 CO2 20 Albumin 2.6  Trop WNL " Lactate WNL Flu A+ PCO2 33 Po 26 PHCO3 21.7 EKG? Sinus Tach around 115, T wave inversions V3-V5, II, III, AVF, Xray: Patchy right basilar consolidation may relate to infection/pneumonia or aspiration.  Clinical correlation and continued imaging follow-up is recommended. Procal +.     Receive Rocephin, Azithro, Mag, and Steroids in ED. Admitted to [facility] for management of Acute Hypoxic Respiratory Failure.       Plan  Q4Hr Breathing treatments  Q4H chest physiotherapy  Q4H IS  Q4H Guaifenesin   CAP coverage with Ceftriaxone and Azithro  Tamiflu for influenza   Spot dosing Lasix for volume overload 2/2 HFpEF  Continue home inhalers  Prednisone 40 mg qd  Ween O2 as possible. Still significant       Weight loss  20-30lb weight loss, will ask nutrition to see pt. Lung Ct neg, will consider other cancer screening. Pt complains of early saiety, no GI symptoms. Dcreased appetite.     Sepsis  This patient does have evidence of infective focus  My overall impression is sepsis.  Source: Respiratory  Antibiotics given-   Antibiotics (72h ago, onward)      Start     Stop Route Frequency Ordered    12/26/23 0900  azithromycin tablet 500 mg         12/28/23 0859 Oral Daily 12/25/23 0910    12/26/23 0600  cefTRIAXone (ROCEPHIN) 1 g in dextrose 5 % in water (D5W) 100 mL IVPB (MB+)         -- IV Every 24 hours (non-standard times) 12/25/23 0910          Latest lactate reviewed-  Recent Labs   Lab 12/25/23  0655   LACTATE 0.9     Organ dysfunction indicated by Acute respiratory failure    Fluid challenge Not needed - patient is not hypotensive      Post- resuscitation assessment No - Post resuscitation assessment not needed       Will Not start Pressors- Levophed for MAP of 65  Source control achieved by: Abx    Ceftriaxone and Azithro and Tamiflu. Ua and blood ctx pending      Pulmonary emphysema  Patient's COPD is with exacerbation noted by continued dyspnea and worsening of baseline hypoxia currently.  Patient is currently off COPD  Pathway. Continue scheduled inhalers Steroids, Antibiotics, and Supplemental oxygen and monitor respiratory status closely.     Gout  Continue home meds      Hypertension  Chronic, controlled. Latest blood pressure and vitals reviewed-     Temp:  [98.5 °F (36.9 °C)-98.6 °F (37 °C)]   Pulse:  []   Resp:  [19-27]   BP: (125-170)/()   SpO2:  [90 %-95 %] .   Home meds for hypertension were reviewed and noted below.   Hypertension Medications               amLODIPine (NORVASC) 5 MG tablet TAKE 1 TABLET BY MOUTH EVERY DAY    carvediloL (COREG) 6.25 MG tablet Take 1 tablet (6.25 mg total) by mouth 2 (two) times daily with meals.    hydroCHLOROthiazide (HYDRODIURIL) 25 MG tablet Take 1 tablet (25 mg total) by mouth once daily.            While in the hospital, will manage blood pressure as follows; Adjust home antihypertensive regimen as follows- Continue Coreg, hold amlodipine and monitor bp in setting of sepsis with good BPs    Will utilize p.r.n. blood pressure medication only if patient's blood pressure greater than 180/110 and she develops symptoms such as worsening chest pain or shortness of breath.    Gastroesophageal reflux disease without esophagitis  Continue home meds        Dyslipidemia  Continue home meds        Coronary artery disease involving native coronary artery of native heart without angina pectoris  Patient with known CAD. Will continue Statin, ASA at home for primary prevention will cosnider holding. SAMPSON and some chest discomfort with exertion, will conisder stress test      VTE Risk Mitigation (From admission, onward)           Ordered     IP VTE HIGH RISK PATIENT  Once         12/25/23 0634     Place sequential compression device  Until discontinued         12/25/23 0634                    Discharge Planning   DEBBIE: 12/27/2023     Code Status: Full Code   Is the patient medically ready for discharge?: No    Reason for patient still in hospital (select all that apply): Treatment            Alejo Bonilla DO  Department of Hospital Medicine   [facility] ([facility]                      12/26/2023                             STAFF PHYSICIAN NOTE                                   Attending Attestation for Rounds with Resident  I have reviewed and concur with the resident's history, physical, assessment, and plan.  I have personally interviewed and examined the patient at bedside and agree with the resident's findings.                      72yo F, CAD, Right carotid stent, Gout, Anxiety, SAH 1999, on home O2 1 liter NC, Hx of ESBL UTI, Anemia, COVID 11/2023, urinary incontinence,  coming in for pneumonia complicated by COPD exacerbation. Has pulm HTN. Former smoker, drinks wine daily. Acute resp. failure & SEPSIS upon arrival. + FLU A. Initially on BiPAP now transitioning to NC.received duonebs, methylpred by EMS, ceftriaxone,Tamilflu,  and azithro. Continuing duonebs, steroids.                    Earline Hernández MD  Senior Hospitalist  Department of Hospital Medicine  [facility] [address], [phone]

## 2023-12-26 NOTE — CARE UPDATE
RAPID RESPONSE NURSE CHART REVIEW        Chart Reviewed: 12/26/2023, 7:30 AM    MRN: [id number]  Bed: 31807/07320 A    Dx: Acute hypoxic respiratory failure    Jessica Floyd has a past medical history of Allergic rhinitis, Amblyopia, Carotid stenosis, Coronary atherosclerosis, Dyslipidemia, ESBL (extended spectrum beta-lactamase) producing bacteria infection, Hypertension, Nausea and vomiting, Pulmonary emphysema, SAH (subarachnoid hemorrhage), and Subarachnoid hemorrhage due to ruptured aneurysm.    Last VS: /63 (BP Location: Right arm, Patient Position: Lying)   Pulse 90   Temp 98.5 °F (36.9 °C) (Oral)   Resp 20   Wt 54 kg (119 lb)   SpO2 (!) 94%   Breastfeeding No   BMI 21.08 kg/m²     24H Vital Sign Range:  Temp:  [98.2 °F (36.8 °C)-98.9 °F (37.2 °C)]   Pulse:  []   Resp:  [14-23]   BP: (113-131)/(63-72)   SpO2:  [89 %-98 %]     Level of Consciousness (AVPU): alert    Recent Labs     12/25/23  0451 12/26/23  0513   WBC 31.65* 27.47*   HGB 10.7* 10.6*   HCT 33.1* 32.7*    359       Recent Labs     12/25/23  0451 12/26/23  0513   * 137   K 2.9* 3.2*    97   CO2 20* 29   BUN 13 19   CREATININE 0.9 0.8   * 121*   PHOS  --  2.8   MG  --  1.7        Recent Labs     12/25/23  0506   PH 7.426   PCO2 33.0*   PO2 36*   HCO3 21.7*   POCSATURATED 72   BE -3*        OXYGEN:  Flow (L/min): 8  Oxygen Concentration (%): 30       MEWS score: 3    Charge RNErma  contacted for AI alert within 24 hours. Reports maintaining SPO2 > 90% on 8L, no increase in oxygen requirements. No additional concerns verbalized at this time. Instructed to call [phone] for further concerns or assistance.    Mary Jo Ron RN

## 2023-12-26 NOTE — ASSESSMENT & PLAN NOTE
"Patient with Hypoxic Respiratory failure which is Acute on chronic.  she is on home oxygen at 1L PRN LPM. Supplemental oxygen was provided and noted-   Signs/symptoms of respiratory failure include- tachypnea, increased work of breathing, respiratory distress, and wheezing. Contributing diagnoses includes - Aspiration, CHF, COPD, and Pneumonia Labs and images were reviewed. Patient Has not had a recent ABG. Will treat underlying causes and adjust management of respiratory failure as follows    73-year-old female with a past medical history of former smoking, CAD, R Carotid stent, dyslipidemia, HTN, gout, anxiety, prev SAH (1999) COPD on home O2 (1L PRN), mild pulmonary hypertension, stress induced reflex syncope, history of ESBL UTI,  now presenting with chief complaint of shortness of breath.  Patient reports that yesterday during the day she experienced worsening shortness of breath and cough.  She has had SOB for weeks, possibly months, and a lingering cough since her COVID infection last month but yesterday had an acute change. Xmas eric she was not feeling well, and around 6 or 7 pm she called ambulence. After some SOB at home and 3L didn't help. Was observed by  to be satting 81 or 82.  Baseline is 94 or 95.  Patient denies any hemoptysis, fevers, or sick contacts. Admits to SAMPSON, productive gray cough, chest tightness with exertion improved with rest, leg swelling, poor exercise tolerance, and balance issues. Reports weight loss of 20-30 lbs since 6 months. Vaxed for flu rsv, pna, and covid. Follows with pulm OP. Recent hospitalization 1 month ago for Covid.   claims pt "does not walk at all" and gets SOB after walking to the restroom. Uses asistance from others or cane / walker to ambulate.     EMS reports that patient had saturation of 80% on room air requiring 3 L nasal cannula.  Albuterol neb was given by EMS in addition to 125 mg of methylprednisone. In the Ed at presentation her vitals were: " /100 P116 RR22 98.5F O2% 93, Initally requiring Bipap, now satting 97 with 8L NC s/p breathing treatment. WBC 31.6 H &H 10.7 33.1  Na 133 K 2.9 CO2 20 Albumin 2.6  Trop WNL Lactate WNL Flu A+ PCO2 33 Po 26 PHCO3 21.7 EKG? Sinus Tach around 115, T wave inversions V3-V5, II, III, AVF, Xray: Patchy right basilar consolidation may relate to infection/pneumonia or aspiration.  Clinical correlation and continued imaging follow-up is recommended. Procal +.     Receive Rocephin, Azithro, Mag, and Steroids in ED. Admitted to [facility] for management of Acute Hypoxic Respiratory Failure.       Plan  Q4Hr Breathing treatments  Q4H chest physiotherapy  Q4H IS  Q4H Guaifenesin   CAP coverage with Ceftriaxone and Azithro  Tamiflu for influenza   Spot dosing Lasix for volume overload 2/2 HFpEF  Continue home inhalers  Prednisone 40 mg qd  Ween O2 as possible. Still significant

## 2023-12-26 NOTE — AI DETERIORATION ALERT
RAPID RESPONSE NURSE AI ALERT       AI alert received.    Chart Reviewed: 12/26/2023, 3:26 AM    MRN: [id number]  Bed: 20265/40583 A    Dx: Acute hypoxic respiratory failure    Jessica Floyd has a past medical history of Allergic rhinitis, Amblyopia, Carotid stenosis, Coronary atherosclerosis, Dyslipidemia, ESBL (extended spectrum beta-lactamase) producing bacteria infection, Hypertension, Nausea and vomiting, Pulmonary emphysema, SAH (subarachnoid hemorrhage), and Subarachnoid hemorrhage due to ruptured aneurysm.    Last VS: /72 (BP Location: Right arm, Patient Position: Lying)   Pulse 106   Temp 98.9 °F (37.2 °C) (Oral)   Resp 19   Wt 54 kg (119 lb)   SpO2 (!) 90%   Breastfeeding No   BMI 21.08 kg/m²     24H Vital Sign Range:  Temp:  [98.2 °F (36.8 °C)-98.9 °F (37.2 °C)]   Pulse:  []   Resp:  [14-27]   BP: (116-170)/()   SpO2:  [90 %-98 %]     Level of Consciousness (AVPU): alert    Recent Labs     12/25/23  0451   WBC 31.65*   HGB 10.7*   HCT 33.1*          Recent Labs     12/25/23  0451   *   K 2.9*      CO2 20*   BUN 13   CREATININE 0.9   *        Recent Labs     12/25/23  0506   PH 7.426   PCO2 33.0*   PO2 36*   HCO3 21.7*   POCSATURATED 72   BE -3*        OXYGEN:  Flow (L/min): 8  Oxygen Concentration (%): 30       MEWS score: 2    charge RNRoberta  contacted. No concerns verbalized at this time, states patient is currently having a coughing spell, SpO2 low 90s on 8L HFNC.     Q4hr breathing treatments modified to not just be given during the day shift, so patient can get breathing treatment now (last given at 2041)  Charge RN to notify RT    Instructed to call [phone] for further concerns or assistance.    KELSEY Velasquez RN

## 2023-12-26 NOTE — PROGRESS NOTES
Pharmacist Renal Dose Adjustment Note    Jessica Floyd is a 73 y.o. female being treated with the medication Tamiflu    Patient Data:    Vital Signs (Most Recent):  Temp: 98.5 °F (36.9 °C) (12/26/23 0717)  Pulse: 92 (12/26/23 0912)  Resp: 20 (12/26/23 0843)  BP: 113/63 (12/26/23 0717)  SpO2: (!) 94 % (12/26/23 0843) Vital Signs (72h Range):  Temp:  [98.2 °F (36.8 °C)-98.9 °F (37.2 °C)]   Pulse:  []   Resp:  [14-27]   BP: (113-170)/()   SpO2:  [89 %-98 %]      Recent Labs   Lab 12/25/23  0451 12/26/23  0513   CREATININE 0.9 0.8     Serum creatinine: 0.8 mg/dL 12/26/23 0513  Estimated creatinine clearance: 51 mL/min    Medication:Tamiflu dose: 75 mg frequency every 12 hours will be changed to medication:Tamiflu dose:30 mg frequency:every 12 hours    Pharmacist's Name: Alyssia Wagner  Pharmacist's Extension: [phone]

## 2023-12-26 NOTE — NURSING
Patient sat in the chair a majority of the day.  Still on 8L of high flow oxygen with sats 93%.  Now resting in bed with safety precautions in place.

## 2023-12-26 NOTE — PT/OT/SLP EVAL
Occupational Therapy  Co- Evaluation/tx    Name: Jessica Floyd  MRN: [id number]  Admitting Diagnosis: Acute hypoxic respiratory failure  Recent Surgery: * No surgery found *    Co-eval to ensure pt. Safety and to accommodate for pt. Activity tolerance  Recommendations:     Discharge Recommendations: Low Intensity Therapy  Discharge Equipment Recommendations:  none  Barriers to discharge:  None    Assessment:     Jessica Floyd is a 73 y.o. female with a medical diagnosis of Acute hypoxic respiratory failure.  She presents with deficits in mobility, ADL tasks and overall level of endurance. Patient would benefit from continued OT services to maximize level of safety and independence with self-care tasks.   . Performance deficits affecting function: weakness, impaired endurance, impaired self care skills, gait instability, impaired cardiopulmonary response to activity, decreased ROM, decreased upper extremity function, decreased lower extremity function.      Rehab Prognosis: Good; patient would benefit from acute skilled OT services to address these deficits and reach maximum level of function.       Plan:     Patient to be seen 3 x/week to address the above listed problems via self-care/home management, therapeutic activities, therapeutic exercises, neuromuscular re-education  Plan of Care Expires: 01/25/24  Plan of Care Reviewed with: patient    Subjective     Chief Complaint: Being sick again. Pt. Reporting it has been a bad year.  Patient/Family Comments/goals: To get better and get home    Occupational Profile:  Living Environment: pt. Resides with her spouse in  house with 6 steps to enter and BHR. Pt. Has a WIS with a built in seat and grab bars. Pt. Has been using a hurry cane.   Previous level of function: pt. Reports that she was using a hurry cane. Pt. Reported that she just had Covid 1 month ago. Normally Independent with ADL tasks. Has been using a cane, walker or spouses arm to walk short distances.    Roles and Routines: caretaker of self, spouse  Equipment Used at Home: bedside commode, bath bench, cane, quad, raised toilet, walker, rolling  Assistance upon Discharge: spouse works    Pain/Comfort:  Pain Rating 1: 0/10  Pain Rating Post-Intervention 1: 0/10    Patients cultural, spiritual, Roman Catholic conflicts given the current situation: no    Objective:     Communicated with: nurse prior to session.  Patient found supine with PureWick, oxygen, peripheral IV, pulse ox (continuous), telemetry upon OT entry to room.    General Precautions: Standard, aspiration, fall, seizure  Orthopedic Precautions: N/A  Braces: N/A  Respiratory Status: Nasal cannula, flow 7 L/min    Occupational Performance:    Bed Mobility:    Pt. Already seated EOB    Functional Mobility/Transfers:  Patient completed Sit <> Stand Transfer with contact guard assistance  with  rolling walker   Patient completed Bed <> Chair Transfer using Stand Pivot technique with contact guard assistance with rolling walker  Functional Mobility: Pt. Ambulated ~ 14 feet with RW and CGA    Activities of Daily Living:  Grooming: set up A seated to brush teeth and wash face  Upper Body Dressing: moderate assistance to don gown like robe  Lower Body Dressing: total assistance to don socks    Cognitive/Visual Perceptual:  Cognitive/Psychosocial Skills:     -       Oriented to: Person, Place, Time, and Situation   -       Follows Commands/attention:Follows multistep  commands  -       Communication: clear/fluent  -       Memory: No Deficits noted  -       Safety awareness/insight to disability: intact   -       Mood/Affect/Coping skills/emotional control: Appropriate to situation  Visual/Perceptual:      -Intact .    Physical Exam:  Balance: -       sit: good; stand: CGA  Postural examination/scapula alignment:    -       Rounded shoulders  -       Forward head  -       Posterior pelvic tilt  Upper Extremity Range of Motion:     -       Right Upper Extremity: AAROM  at shoulder WFL; elbow and below WFL  -       Left Upper Extremity: WFL   Strength:    -       Right Upper Extremity: WFL  -       Left Upper Extremity: WFL    AMPAC 6 Click ADL:  [facility] Total Score: 19    Treatment & Education:  Pt. Educated on role of OT and POC  Pt. Educated on importance of OOB/therapy to get stronger    Patient left up in chair with all lines intact, call button in reach, and nurse notified    GOALS:   Multidisciplinary Problems       Occupational Therapy Goals          Problem: Occupational Therapy    Goal Priority Disciplines Outcome Interventions   Occupational Therapy Goal     OT, PT/OT Ongoing, Progressing    Description: Goals to be met by: 01-08-24     Patient will increase functional independence with ADLs by performing:    UE Dressing with Set-up Assistance.  LE Dressing with Supervision with AE as needed  Grooming while standing at sink with Supervision.  Toileting from toilet with Supervision for hygiene and clothing management.   Supine to sit with Supervision.  Stand pivot transfers with Supervision with RW  Toilet transfer to toilet with Supervision.                         History:     Past Medical History:   Diagnosis Date    Allergic rhinitis     Amblyopia     Carotid stenosis     Coronary atherosclerosis     Outside [facility] 6/2014: 20% mLAD, 50% mCx, 20%, mRCA    Dyslipidemia     ESBL (extended spectrum beta-lactamase) producing bacteria infection     UTI    Hypertension     Nausea and vomiting 05/26/2017    Pulmonary emphysema 10/28/2023    SAH (subarachnoid hemorrhage) 08/24/2016 1999, s/p clipping    Subarachnoid hemorrhage due to ruptured aneurysm 1999         Past Surgical History:   Procedure Laterality Date    ADENOIDECTOMY      ANTERIOR CRUCIATE LIGAMENT REPAIR  2012    APPENDECTOMY      CATARACT EXTRACTION W/  INTRAOCULAR LENS IMPLANT Right 11/07/2022    Procedure: EXTRACTION, CATARACT, WITH IOL INSERTION;  Surgeon: Higinio Cristina MD;  Location: [facility];  Service:  Ophthalmology;  Laterality: Right;    CATARACT EXTRACTION W/  INTRAOCULAR LENS IMPLANT Left 11/21/2022    Procedure: EXTRACTION, CATARACT, WITH IOL INSERTION;  Surgeon: Higinio Cristina MD;  Location: [facility];  Service: Ophthalmology;  Laterality: Left;    CEREBRAL ANEURYSM REPAIR  1999    clip    DENTAL SURGERY      EYE SURGERY Bilateral     cataract removal and lens implants    HIP ARTHROPLASTY Left 05/28/2023    Procedure: TOTAL HIP ARTHROPLASTY;  Surgeon: Juan Wan MD;  Location: [address];  Service: Orthopedics;  Laterality: Left;    TONSILLECTOMY         Time Tracking:     OT Date of Treatment: 12/26/23  OT Start Time: 1010  OT Stop Time: 1030  OT Total Time (min): 20 min    Billable Minutes:Evaluation 10  Self Care/Home Management 10    12/26/2023

## 2023-12-26 NOTE — CONSULTS
[facility] - Intensive Care ([facility]  Adult Nutrition  Consult Note    SUMMARY     Recommendations    Liberalize diet to Regular (fluid restriction per MD) & add Boost Plus ONS TID.  RD to monitor & follow-up.    Goals: Meet % EEN, EPN by RD f/u date  Nutrition Goal Status: new  Communication of RD Recs: reviewed with RN    Assessment and Plan    Moderate malnutrition    Malnutrition Type:  Context: acute illness or injury  Level: moderate    Related to (etiology):   Decreased appetite     Signs and Symptoms (as evidenced by):   Weight loss, NFPE, inadequate energy intake    Malnutrition Characteristic Summary:  Weight Loss (Malnutrition): other (see comments) (15% x 8-9 months)  Energy Intake (Malnutrition): less than 75% for greater than or equal to 1 month  Subcutaneous Fat (Malnutrition): moderate depletion  Muscle Mass (Malnutrition): moderate depletion    Interventions/Recommendations (treatment strategy):  Collaboration of nutrition care w/ other providers  ONS    Nutrition Diagnosis Status:   New    Malnutrition Assessment    Malnutrition Context: acute illness or injury  Malnutrition Level: moderate    Weight Loss (Malnutrition): other (see comments) (15% x 8-9 months)  Energy Intake (Malnutrition): less than 75% for greater than or equal to 1 month  Subcutaneous Fat (Malnutrition): moderate depletion  Muscle Mass (Malnutrition): moderate depletion     Reason for Assessment    Reason For Assessment: consult  Diagnosis: other (see comments) (Resp. fx)  Relevant Medical History: HTN  Interdisciplinary Rounds: did not attend    General Information Comments: Pt w/ decreased appetite currently/PTA & UBW ~140#. Pt meets criteria for moderate malnutrition - please see PES statement for details. Visual NFPE was complete as pt was resting this AM during visit.  Nutrition Discharge Planning: Adequate PO intake    Nutrition/Diet History    Factors Affecting Nutritional Intake: decreased  "appetite    Anthropometrics    Temp: 98.5 °F (36.9 °C)  Height: 5' 3" (160 cm)  Height (inches): 63 in  Weight Method: Standard Scale  Weight: 54 kg (119 lb 0.8 oz)  Weight (lb): 119.05 lb  Ideal Body Weight (IBW), Female: 115 lb  % Ideal Body Weight, Female (lb): 103.52 %  BMI (Calculated): 21.1  BMI Grade: 18.5-24.9 - normal  Usual Body Weight (UBW), k.6 kg  % Usual Body Weight: 85.08  % Weight Change From Usual Weight: -15.1 %    Lab/Procedures/Meds    Pertinent Labs Reviewed: reviewed  Pertinent Medications Reviewed: reviewed  Pertinent Medications Comments: Statin    Estimated/Assessed Needs    Weight Used For Calorie Calculations: 54 kg (119 lb 0.8 oz)    Energy Calorie Requirements (kcal): 1620 kcal/d  Energy Need Method: Kcal/kg (30 kcal/kg)    Protein Requirements: 70 g/d (1.3 g/kg)  Weight Used For Protein Calculations: 54 kg (119 lb 0.8 oz)    Estimated Fluid Requirement Method: other (see comments) (Per MD or 1 mL/kcal)  RDA Method (mL): 1620    Nutrition Prescription Ordered    Current Diet Order: Cardiac  Nutrition Order Comments: 1500 mL FR    Evaluation of Received Nutrient/Fluid Intake    I/O: -8.6L since admit    Comments: LBM:     Tolerance: tolerating    Nutrition Risk    Level of Risk/Frequency of Follow-up:  (1x/week)     Monitor and Evaluation    Food and Nutrient Intake: food and beverage intake, energy intake  Food and Nutrient Adminstration: diet order  Physical Activity and Function: nutrition-related ADLs and IADLs  Anthropometric Measurements: weight, weight change  Biochemical Data, Medical Tests and Procedures: inflammatory profile, lipid profile, glucose/endocrine profile, gastrointestinal profile, electrolyte and renal panel  Nutrition-Focused Physical Findings: overall appearance     Nutrition Follow-Up    RD Follow-up?: Yes    "

## 2023-12-26 NOTE — PT/OT/SLP EVAL
Physical Therapy Co-Evaluation and Co-Treatment    Patient Name:  Jessica Floyd   MRN:  [id number]  Admit Date: 12/25/2023  Admitting Diagnosis:  Acute hypoxic respiratory failure   Length of Stay: 0 days  Recent Surgery: * No surgery found *      Recommendations:     Discharge Recommendations:    Low Intensity Therapy   Discharge Equipment Recommendations: none   Barriers to discharge: None    Appropriate transfer level with nursing staff: Safe to transfer to bedside chair/bedside commode: stand pivot with 1 person with RW.    Plan:     During this hospitalization, patient to be seen 3 x/week to address the identified rehab impairments via gait training, therapeutic activities, therapeutic exercises, neuromuscular re-education and progress towards the established goals.  Plan of Care Expires:  01/26/24  Plan of Care Reviewed with: patient    Assessment:     Jessica Floyd is a 73 y.o. female admitted with a medical diagnosis of Acute hypoxic respiratory failure. Pt agreeable and motivated for therapy session. Pt reports Ind with mobility in the home PTA and mod I for community mobility with hurrycane or RW use. Pt reports today with decreased strength, endurance and activity tolerance. Pt functioning below her baseline PLOF and is a fall risk. Patient would benefit from skilled therapy services to maximize safety and independence, increase activity tolerance, decrease fall risk, decrease caregiver burden, improve QOL, improve patient's functional mobility, and decrease risk of contractures and pressure sores. Patient currently demonstrates a need for low intensity therapy on a scheduled basis secondary to a decline in functional status due to illness     Problem List: weakness, impaired endurance, impaired self care skills, impaired functional mobility, gait instability, impaired balance, impaired cardiopulmonary response to activity, decreased safety awareness, decreased upper extremity function, decreased lower  extremity function.  Rehab Prognosis: Good; patient would benefit from acute skilled PT services to address these deficits and reach maximum level of function.      Goals:   Multidisciplinary Problems       Physical Therapy Goals          Problem: Physical Therapy    Goal Priority Disciplines Outcome Goal Variances Interventions   Physical Therapy Goal     PT, PT/OT Ongoing, Progressing     Description: Goals to be met by: 24     Patient will increase functional independence with mobility by performin. Supine to sit with [place]  2. Sit to stand transfer with [place]  3. Bed to chair transfer with [place] using LRAD  4. Gait  x 200 feet with Supervision using LRAD.   5. Ascend/descend 6 stair with bilateral Handrails Supervision using LRAD.   6. Lower extremity exercise program x30 reps per handout, with independence                         Subjective     RN notified prior to session. No one present upon PT entrance into room. Patient agreeable to PT evaluation.    Chief Complaint: Feeling bad, dealing with illness throughout the year  Patient/Family Comments/goals: feel better, go home  Pain/Comfort:  Pain Rating 1: 0/10  Pain Rating Post-Intervention 1: 0/10    Social History:  Residence: lives with their spouse 1-story house with 6 EMILY and B HR. Pt's bathroom has a WIS with seat and grab bars.  Support available:  spouse  Equipment Owned (using): bath bench, raised toilet, walker, rolling, grab bar, hurrycane  Prior level of function: pt reports Ind with household mobility and mod I for community mobility with either RW or hurrycane; reports Ind with ADLs      Patient reports they will have assistance from spouse upon discharge.    Objective:     Additional staff present: OT; OT for co-evaluation due to suspected patient need for two skilled therapists to appropriately assess patient's functional deficits as well as ensure patient safety, accommodate for limited activity tolerance, and  "provide appropriate, skilled assistance to maximize functional potential during evaluation.    Patient found sitting edge of bed with: pulse ox (continuous), telemetry, PureWick, blood pressure cuff, oxygen     General Precautions: Standard, Cardiac fall, aspiration, seizure   Orthopedic Precautions:N/A   Braces: N/A   Body mass index is 21.09 kg/m².  Oxygen Device: High Flow Nasal Cannula 7L  Vitals: /66 (BP Location: Right arm, Patient Position: Sitting)   Pulse 88   Temp 98.9 °F (37.2 °C) (Oral)   Resp 18   Ht 5' 3" (1.6 m)   Wt 54 kg (119 lb 0.8 oz)   SpO2 (!) 91%   Breastfeeding No   BMI 21.09 kg/m²       Exams:    Cognition:  Alert and Cooperative  Command following: Follows multistep verbal commands  Communication: clear/fluent    Sensation:   Light touch sensation: Intact BLEs    Gross Motor Coordination: No deficits noted during functional mobility tasks     Edema/Skin Integrity: None noted; Visible skin intact    Postural examination/scapula alignment: Rounded shoulder    Lower Extremity Range of Motion:  Right Lower Extremity: WFL  Left Lower Extremity: WFL    Lower Extremity Strength:  Right Lower Extremity: WFL except hip flexors 4/5  Left Lower Extremity: WFL except hip flexors 4/5       Functional Mobility:    Bed Mobility:  Pt found sitting EOB/left in chair     Transfers:   Sit <> Stand Transfer: Contact Guard Assistance x 1 trials from EOB with RW    Bed <> Chair: Step Transfer with Contact Guard Assistance with RW               Gait:  Distance: 4' to chair   Assistance level: Contact Guard Assistance  Assistive Device: rolling walker  Gait Deviation(s): occasional unsteady gait, decreased step length, flexed posture, and decreased aliza  all lines remained intact throughout ambulation trial  Comments: Pt steady with no LOB. Pt with oxygen saturation >90% during trial.   Balance:  Sitting Balance  Static: supervision  Dynamic: supervision  Standing:  Static: stand by assistance with " RW  Dynamic: contact guard assistance with RW    Outcome Measures:  AM-PAC 6 CLICK MOBILITY  Turning over in bed (including adjusting bedclothes, sheets and blankets)?: 3  Sitting down on and standing up from a chair with arms (e.g., wheelchair, bedside commode, etc.): 3  Moving from lying on back to sitting on the side of the bed?: 3  Moving to and from a bed to a chair (including a wheelchair)?: 3  Need to walk in hospital room?: 3  Climbing 3-5 steps with a railing?: 3  Basic Mobility Total Score: 18       Education:  Time provided for education, counseling and discussion of health disposition in regards to patient's current status  All questions answered within PT scope of practice and to patient's satisfaction  PT role in POC to address current functional deficits  Pt educated on proper body mechanics, safety techniques, and energy conservation with PT facilitation and cueing throughout session  Call nursing/pct to transfer to chair/use bathroom. Pt stated understanding.    Patient left up in chair with all lines intact, call button in reach, and RN notified.      History:     Past Medical History:   Diagnosis Date    Allergic rhinitis     Amblyopia     Carotid stenosis     Coronary atherosclerosis     Outside [facility] 6/2014: 20% mLAD, 50% mCx, 20%, mRCA    Dyslipidemia     ESBL (extended spectrum beta-lactamase) producing bacteria infection     UTI    Hypertension     Nausea and vomiting 05/26/2017    Pulmonary emphysema 10/28/2023    SAH (subarachnoid hemorrhage) 08/24/2016 1999, s/p clipping    Subarachnoid hemorrhage due to ruptured aneurysm 1999       Past Surgical History:   Procedure Laterality Date    ADENOIDECTOMY      ANTERIOR CRUCIATE LIGAMENT REPAIR  2012    APPENDECTOMY      CATARACT EXTRACTION W/  INTRAOCULAR LENS IMPLANT Right 11/07/2022    Procedure: EXTRACTION, CATARACT, WITH IOL INSERTION;  Surgeon: Higinio Cristina MD;  Location: [facility];  Service: Ophthalmology;  Laterality: Right;    CATARACT  EXTRACTION W/  INTRAOCULAR LENS IMPLANT Left 2022    Procedure: EXTRACTION, CATARACT, WITH IOL INSERTION;  Surgeon: Higinio Cristina MD;  Location: [facility];  Service: Ophthalmology;  Laterality: Left;    CEREBRAL ANEURYSM REPAIR      clip    DENTAL SURGERY      EYE SURGERY Bilateral     cataract removal and lens implants    HIP ARTHROPLASTY Left 2023    Procedure: TOTAL HIP ARTHROPLASTY;  Surgeon: Juan Wna MD;  Location: [address];  Service: Orthopedics;  Laterality: Left;    TONSILLECTOMY         Family History   Problem Relation Age of Onset    Hypertension Mother     Aneurysm Mother     Hypertension Father     Alcohol abuse Father     Kidney disease Brother     Heart disease Brother     Gout Brother     No Known Problems Daughter     No Known Problems Daughter     No Known Problems Daughter        Social History     Socioeconomic History    Marital status:    Occupational History    Occupation: Retired   Tobacco Use    Smoking status: Former     Current packs/day: 0.00     Average packs/day: 0.5 packs/day for 20.0 years (10.0 ttl pk-yrs)     Types: Cigarettes     Start date: 1979     Quit date: 1999     Years since quittin.0     Passive exposure: Past    Smokeless tobacco: Never   Substance and Sexual Activity    Alcohol use: Yes     Alcohol/week: 7.0 standard drinks of alcohol     Types: 7 Glasses of wine per week     Comment: One glass of Red wine prior to dinner    Drug use: No    Sexual activity: Yes     Partners: Male   Social History Narrative    .  Has 3 grown daughters.       Social Determinants of Health     Financial Resource Strain: Low Risk  (2023)    Overall Financial Resource Strain (CARDIA)     Difficulty of Paying Living Expenses: Not hard at all   Food Insecurity: No Food Insecurity (2023)    Hunger Vital Sign     Worried About Running Out of Food in the Last Year: Never true     Ran Out of Food in the Last Year: Never  true   Transportation Needs: No Transportation Needs (11/27/2023)    PRAPARE - Transportation     Lack of Transportation (Medical): No     Lack of Transportation (Non-Medical): No   Physical Activity: Insufficiently Active (11/27/2023)    Exercise Vital Sign     Days of Exercise per Week: 1 day     Minutes of Exercise per Session: 30 min   Stress: No Stress Concern Present (11/27/2023)    [place] [place] [employer] - Occupational Stress Questionnaire     Feeling of Stress : Not at all   Recent Concern: Stress - Stress Concern Present (10/28/2023)    [place] [employer] - Occupational Stress Questionnaire     Feeling of Stress : To some extent   Social Connections: Socially Integrated (11/27/2023)    Social Connection and Isolation Panel [NHANES]     Frequency of Communication with Friends and Family: More than three times a week     Frequency of Social Gatherings with Friends and Family: More than three times a week     Attends [employer] Services: More than 4 times per year     Active Member of Clubs or Organizations: Yes     Attends Club or Organization Meetings: 1 to 4 times per year     Marital Status:    Housing Stability: Low Risk  (11/27/2023)    Housing Stability Vital Sign     Unable to Pay for Housing in the Last Year: No     Number of Places Lived in the Last Year: 1     Unstable Housing in the Last Year: No       Time Tracking:     PT Received On: 12/26/23  PT Start Time: 1010     PT Stop Time: 1030  PT Total Time (min): 20 min     Billable Minutes: Evaluation 10 minutes and Therapeutic Activity 10 minutes    12/26/2023

## 2023-12-26 NOTE — PLAN OF CARE
PT evaluation completed- see note for details. PT POC and goals established.    Problem: Physical Therapy  Goal: Physical Therapy Goal  Description: Goals to be met by: 24     Patient will increase functional independence with mobility by performin. Supine to sit with [place]  2. Sit to stand transfer with [place]  3. Bed to chair transfer with [place] using LRAD  4. Gait  x 200 feet with Supervision using LRAD.   5. Ascend/descend 6 stair with bilateral Handrails Supervision using LRAD.   6. Lower extremity exercise program x30 reps per handout, with independence    Outcome: Ongoing, Progressing

## 2023-12-26 NOTE — ASSESSMENT & PLAN NOTE
Malnutrition Type:  Context: acute illness or injury  Level: moderate    Related to (etiology):   Decreased appetite     Signs and Symptoms (as evidenced by):   Weight loss, NFPE, inadequate energy intake    Malnutrition Characteristic Summary:  Weight Loss (Malnutrition): other (see comments) (15% x 8-9 months)  Energy Intake (Malnutrition): less than 75% for greater than or equal to 1 month  Subcutaneous Fat (Malnutrition): moderate depletion  Muscle Mass (Malnutrition): moderate depletion    Interventions/Recommendations (treatment strategy):  Collaboration of nutrition care w/ other providers  ONS    Nutrition Diagnosis Status:   New

## 2023-12-26 NOTE — SUBJECTIVE & OBJECTIVE
Interval History: No Acute Overnight Events. Patient is afebrile and hemodynamically stable. Coughing spell- received DXM and Guaf. Insomnia- Received Seroquel. Scheduling those drugs for today.     Review of Systems   Reason unable to perform ROS: as per hpi.           Objective:     Vital Signs (Most Recent):  Temp: 98.7 °F (37.1 °C) (12/26/23 0501)  Pulse: 93 (12/26/23 0501)  Resp: 20 (12/26/23 0501)  BP: 131/67 (12/26/23 0501)  SpO2: (!) 94 % (12/26/23 0345) Vital Signs (24h Range):  Temp:  [98.2 °F (36.8 °C)-98.9 °F (37.2 °C)] 98.7 °F (37.1 °C)  Pulse:  [] 93  Resp:  [14-27] 20  SpO2:  [90 %-98 %] 94 %  BP: (116-141)/(63-72) 131/67     Weight: 54 kg (119 lb)  Body mass index is 21.08 kg/m².    Intake/Output Summary (Last 24 hours) at 12/26/2023 0554  Last data filed at 12/26/2023 0532  Gross per 24 hour   Intake 87.89 ml   Output 1250 ml   Net -1162.11 ml         Physical Exam  Vitals and nursing note reviewed. Exam conducted with a chaperone present.   Constitutional:       General: She is not in acute distress.     Appearance: She is not ill-appearing, toxic-appearing or diaphoretic.   HENT:      Head: Normocephalic and atraumatic.      Right Ear: External ear normal.      Left Ear: External ear normal.      Mouth/Throat:      Mouth: Mucous membranes are moist.      Pharynx: No oropharyngeal exudate.   Eyes:      Extraocular Movements: Extraocular movements intact.      Pupils: Pupils are equal, round, and reactive to light.   Neck:      Comments: JVD  Cardiovascular:      Rate and Rhythm: Normal rate and regular rhythm.      Pulses: Normal pulses.      Heart sounds: Normal heart sounds. No murmur heard.  Pulmonary:      Effort: Respiratory distress present.      Breath sounds: No wheezing or rales.      Comments: NC in place  Decreased breath sounds RLL  Chest:      Chest wall: No tenderness.   Abdominal:      General: Abdomen is flat. Bowel sounds are normal. There is no distension.      Palpations:  Abdomen is soft. There is no mass.      Tenderness: There is no abdominal tenderness.   Musculoskeletal:         General: No swelling or tenderness. Normal range of motion.      Cervical back: Normal range of motion and neck supple.      Right lower leg: No edema.      Left lower leg: No edema.      Comments: Trace edema in BLE   Skin:     General: Skin is warm and dry.      Capillary Refill: Capillary refill takes less than 2 seconds.   Neurological:      General: No focal deficit present.      Mental Status: She is alert and oriented to person, place, and time. Mental status is at baseline.   Psychiatric:         Mood and Affect: Mood normal.         Behavior: Behavior normal.               Significant Labs: All pertinent labs within the past 24 hours have been reviewed.    Significant Imaging: I have reviewed all pertinent imaging results/findings within the past 24 hours.

## 2023-12-26 NOTE — PLAN OF CARE
Problem: Occupational Therapy  Goal: Occupational Therapy Goal  Description: Goals to be met by: 01-08-24     Patient will increase functional independence with ADLs by performing:    UE Dressing with Set-up Assistance.  LE Dressing with Supervision with AE as needed  Grooming while standing at sink with Supervision.  Toileting from toilet with Supervision for hygiene and clothing management.   Supine to sit with Supervision.  Stand pivot transfers with Supervision with RW  Toilet transfer to toilet with Supervision.    Outcome: Ongoing, Progressing

## 2023-12-26 NOTE — PLAN OF CARE
Pt A and O x4, NC with 8LPM, humidified, pt with copious secretions from productive strong cough, afebrile, continent of B and B except stress cough/incontinence. Pt has namita treaments and meds well and has all equip within arms reach, no further concerns at this time.     .  Problem: Adult Inpatient Plan of Care  Goal: Plan of Care Review  Outcome: Ongoing, Progressing  Goal: Patient-Specific Goal (Individualized)  Outcome: Ongoing, Progressing  Goal: Absence of Hospital-Acquired Illness or Injury  Outcome: Ongoing, Progressing  Goal: Optimal Comfort and Wellbeing  Outcome: Ongoing, Progressing  Goal: Readiness for Transition of Care  Outcome: Ongoing, Progressing     Problem: Impaired Wound Healing  Goal: Optimal Wound Healing  Outcome: Ongoing, Progressing     Problem: Adjustment to Illness (Sepsis/Septic Shock)  Goal: Optimal Coping  Outcome: Ongoing, Progressing

## 2023-12-26 NOTE — PLAN OF CARE
Recommendations     Liberalize diet to Regular (fluid restriction per MD) & add Boost Plus ONS TID.  RD to monitor & follow-up.     Goals: Meet % EEN, EPN by RD f/u date  Nutrition Goal Status: new  Communication of RD Recs: reviewed with RN

## 2023-12-27 LAB
ALBUMIN SERPL BCP-MCNC: 2.6 G/DL (ref 3.5–5.2)
ALP SERPL-CCNC: 90 U/L (ref 55–135)
ALT SERPL W/O P-5'-P-CCNC: 21 U/L (ref 10–44)
ANION GAP SERPL CALC-SCNC: 13 MMOL/L (ref 8–16)
AST SERPL-CCNC: 34 U/L (ref 10–40)
BASOPHILS # BLD AUTO: 0.17 K/UL (ref 0–0.2)
BASOPHILS NFR BLD: 1 % (ref 0–1.9)
BILIRUB SERPL-MCNC: 0.6 MG/DL (ref 0.1–1)
BUN SERPL-MCNC: 18 MG/DL (ref 8–23)
CALCIUM SERPL-MCNC: 9.3 MG/DL (ref 8.7–10.5)
CHLORIDE SERPL-SCNC: 100 MMOL/L (ref 95–110)
CO2 SERPL-SCNC: 28 MMOL/L (ref 23–29)
CREAT SERPL-MCNC: 0.8 MG/DL (ref 0.5–1.4)
DIFFERENTIAL METHOD BLD: ABNORMAL
EOSINOPHIL # BLD AUTO: 0 K/UL (ref 0–0.5)
EOSINOPHIL NFR BLD: 0.1 % (ref 0–8)
ERYTHROCYTE [DISTWIDTH] IN BLOOD BY AUTOMATED COUNT: 18.4 % (ref 11.5–14.5)
EST. GFR  (NO RACE VARIABLE): >60 ML/MIN/1.73 M^2
GLUCOSE SERPL-MCNC: 97 MG/DL (ref 70–110)
HCT VFR BLD AUTO: 32.7 % (ref 37–48.5)
HGB BLD-MCNC: 10.9 G/DL (ref 12–16)
IMM GRANULOCYTES # BLD AUTO: 0.32 K/UL (ref 0–0.04)
IMM GRANULOCYTES NFR BLD AUTO: 1.8 % (ref 0–0.5)
LYMPHOCYTES # BLD AUTO: 1.9 K/UL (ref 1–4.8)
LYMPHOCYTES NFR BLD: 10.8 % (ref 18–48)
MAGNESIUM SERPL-MCNC: 1.6 MG/DL (ref 1.6–2.6)
MCH RBC QN AUTO: 34.4 PG (ref 27–31)
MCHC RBC AUTO-ENTMCNC: 33.3 G/DL (ref 32–36)
MCV RBC AUTO: 103 FL (ref 82–98)
MONOCYTES # BLD AUTO: 1.8 K/UL (ref 0.3–1)
MONOCYTES NFR BLD: 10.2 % (ref 4–15)
NEUTROPHILS # BLD AUTO: 13.5 K/UL (ref 1.8–7.7)
NEUTROPHILS NFR BLD: 76.1 % (ref 38–73)
NRBC BLD-RTO: 0 /100 WBC
PHOSPHATE SERPL-MCNC: 2.8 MG/DL (ref 2.7–4.5)
PLATELET # BLD AUTO: 382 K/UL (ref 150–450)
PMV BLD AUTO: 10.4 FL (ref 9.2–12.9)
POTASSIUM SERPL-SCNC: 3 MMOL/L (ref 3.5–5.1)
PROT SERPL-MCNC: 7.1 G/DL (ref 6–8.4)
RBC # BLD AUTO: 3.17 M/UL (ref 4–5.4)
SODIUM SERPL-SCNC: 141 MMOL/L (ref 136–145)
WBC # BLD AUTO: 17.74 K/UL (ref 3.9–12.7)

## 2023-12-27 PROCEDURE — C9113 INJ PANTOPRAZOLE SODIUM, VIA: HCPCS | Mod: HCNC

## 2023-12-27 PROCEDURE — 25000242 PHARM REV CODE 250 ALT 637 W/ HCPCS: Mod: HCNC | Performed by: HOSPITALIST

## 2023-12-27 PROCEDURE — 99900035 HC TECH TIME PER 15 MIN (STAT): Mod: HCNC

## 2023-12-27 PROCEDURE — 25000003 PHARM REV CODE 250: Mod: HCNC

## 2023-12-27 PROCEDURE — 27100171 HC OXYGEN HIGH FLOW UP TO 24 HOURS: Mod: HCNC

## 2023-12-27 PROCEDURE — 27000646 HC AEROBIKA DEVICE: Mod: HCNC

## 2023-12-27 PROCEDURE — 36415 COLL VENOUS BLD VENIPUNCTURE: CPT | Mod: HCNC

## 2023-12-27 PROCEDURE — 93005 ELECTROCARDIOGRAM TRACING: CPT | Mod: HCNC

## 2023-12-27 PROCEDURE — 80053 COMPREHEN METABOLIC PANEL: CPT | Mod: HCNC

## 2023-12-27 PROCEDURE — 25000003 PHARM REV CODE 250: Mod: HCNC | Performed by: HOSPITALIST

## 2023-12-27 PROCEDURE — 27000207 HC ISOLATION: Mod: HCNC

## 2023-12-27 PROCEDURE — 63700000 PHARM REV CODE 250 ALT 637 W/O HCPCS: Mod: HCNC

## 2023-12-27 PROCEDURE — 84100 ASSAY OF PHOSPHORUS: CPT | Mod: HCNC

## 2023-12-27 PROCEDURE — 20600001 HC STEP DOWN PRIVATE ROOM: Mod: HCNC

## 2023-12-27 PROCEDURE — 94761 N-INVAS EAR/PLS OXIMETRY MLT: CPT | Mod: HCNC

## 2023-12-27 PROCEDURE — 94664 DEMO&/EVAL PT USE INHALER: CPT | Mod: HCNC

## 2023-12-27 PROCEDURE — 85025 COMPLETE CBC W/AUTO DIFF WBC: CPT | Mod: HCNC

## 2023-12-27 PROCEDURE — 93010 ELECTROCARDIOGRAM REPORT: CPT | Mod: HCNC,,, | Performed by: INTERNAL MEDICINE

## 2023-12-27 PROCEDURE — 97535 SELF CARE MNGMENT TRAINING: CPT | Mod: HCNC

## 2023-12-27 PROCEDURE — 83735 ASSAY OF MAGNESIUM: CPT | Mod: HCNC

## 2023-12-27 PROCEDURE — 63600175 PHARM REV CODE 636 W HCPCS: Mod: HCNC

## 2023-12-27 PROCEDURE — 94640 AIRWAY INHALATION TREATMENT: CPT | Mod: HCNC

## 2023-12-27 RX ORDER — METOCLOPRAMIDE HYDROCHLORIDE 5 MG/ML
5 INJECTION INTRAMUSCULAR; INTRAVENOUS ONCE
Status: COMPLETED | OUTPATIENT
Start: 2023-12-27 | End: 2023-12-27

## 2023-12-27 RX ORDER — BACITRACIN 500 [USP'U]/G
OINTMENT TOPICAL 3 TIMES DAILY
Status: DISCONTINUED | OUTPATIENT
Start: 2023-12-27 | End: 2023-12-29 | Stop reason: HOSPADM

## 2023-12-27 RX ORDER — PROCHLORPERAZINE EDISYLATE 5 MG/ML
5 INJECTION INTRAMUSCULAR; INTRAVENOUS ONCE AS NEEDED
Status: COMPLETED | OUTPATIENT
Start: 2023-12-27 | End: 2023-12-27

## 2023-12-27 RX ORDER — PANTOPRAZOLE SODIUM 40 MG/10ML
40 INJECTION, POWDER, LYOPHILIZED, FOR SOLUTION INTRAVENOUS ONCE
Status: COMPLETED | OUTPATIENT
Start: 2023-12-27 | End: 2023-12-27

## 2023-12-27 RX ORDER — QUETIAPINE FUMARATE 25 MG/1
25 TABLET, FILM COATED ORAL ONCE
Status: DISCONTINUED | OUTPATIENT
Start: 2023-12-27 | End: 2023-12-29 | Stop reason: HOSPADM

## 2023-12-27 RX ADMIN — ATORVASTATIN CALCIUM 80 MG: 40 TABLET, FILM COATED ORAL at 09:12

## 2023-12-27 RX ADMIN — OSELTAMIVIR PHOSPHATE 30 MG: 30 CAPSULE ORAL at 09:12

## 2023-12-27 RX ADMIN — IPRATROPIUM BROMIDE AND ALBUTEROL SULFATE 3 ML: .5; 3 SOLUTION RESPIRATORY (INHALATION) at 12:12

## 2023-12-27 RX ADMIN — ONDANSETRON 8 MG: 8 TABLET, ORALLY DISINTEGRATING ORAL at 03:12

## 2023-12-27 RX ADMIN — CARVEDILOL 6.25 MG: 3.12 TABLET, FILM COATED ORAL at 05:12

## 2023-12-27 RX ADMIN — CARVEDILOL 6.25 MG: 3.12 TABLET, FILM COATED ORAL at 07:12

## 2023-12-27 RX ADMIN — ALLOPURINOL 200 MG: 100 TABLET ORAL at 09:12

## 2023-12-27 RX ADMIN — PANTOPRAZOLE SODIUM 40 MG: 40 INJECTION, POWDER, FOR SOLUTION INTRAVENOUS at 04:12

## 2023-12-27 RX ADMIN — BACITRACIN: 500 OINTMENT TOPICAL at 01:12

## 2023-12-27 RX ADMIN — ASPIRIN 81 MG: 81 TABLET, COATED ORAL at 09:12

## 2023-12-27 RX ADMIN — METOCLOPRAMIDE 5 MG: 5 INJECTION, SOLUTION INTRAMUSCULAR; INTRAVENOUS at 01:12

## 2023-12-27 RX ADMIN — ENOXAPARIN SODIUM 40 MG: 40 INJECTION SUBCUTANEOUS at 05:12

## 2023-12-27 RX ADMIN — GUAIFENESIN 200 MG: 200 SOLUTION ORAL at 09:12

## 2023-12-27 RX ADMIN — GUAIFENESIN 200 MG: 200 SOLUTION ORAL at 05:12

## 2023-12-27 RX ADMIN — BACITRACIN: 500 OINTMENT TOPICAL at 09:12

## 2023-12-27 RX ADMIN — FOLIC ACID 1 MG: 1 TABLET ORAL at 09:12

## 2023-12-27 RX ADMIN — AZITHROMYCIN DIHYDRATE 500 MG: 250 TABLET ORAL at 09:12

## 2023-12-27 RX ADMIN — ALUMINUM HYDROXIDE, MAGNESIUM HYDROXIDE, AND DIMETHICONE 30 ML: 400; 400; 40 SUSPENSION ORAL at 09:12

## 2023-12-27 RX ADMIN — PROCHLORPERAZINE EDISYLATE 5 MG: 5 INJECTION INTRAMUSCULAR; INTRAVENOUS at 04:12

## 2023-12-27 RX ADMIN — PANTOPRAZOLE SODIUM 40 MG: 40 TABLET, DELAYED RELEASE ORAL at 09:12

## 2023-12-27 RX ADMIN — CEFTRIAXONE 1 G: 1 INJECTION, POWDER, FOR SOLUTION INTRAMUSCULAR; INTRAVENOUS at 04:12

## 2023-12-27 RX ADMIN — IPRATROPIUM BROMIDE AND ALBUTEROL SULFATE 3 ML: .5; 3 SOLUTION RESPIRATORY (INHALATION) at 07:12

## 2023-12-27 RX ADMIN — Medication 100 MG: at 09:12

## 2023-12-27 RX ADMIN — PREDNISONE 40 MG: 20 TABLET ORAL at 09:12

## 2023-12-27 RX ADMIN — QUETIAPINE FUMARATE 50 MG: 25 TABLET ORAL at 09:12

## 2023-12-27 NOTE — PT/OT/SLP PROGRESS
Occupational Therapy   Treatment    Name: Jessica Floyd  MRN: [id number]  Admitting Diagnosis:  Acute hypoxic respiratory failure       Recommendations:     Discharge Recommendations: Low Intensity Therapy  Discharge Equipment Recommendations:  none  Barriers to discharge:  None    Assessment:     Jessica Floyd is a 73 y.o. female with a medical diagnosis of Acute hypoxic respiratory failure.  She presents with deficits in self-care tasks as well as higher level ADL tasks involving standing.Pt. participated well on this date and appears to be making improvements with mobility and ADL tasks. Patient would benefit from continued OT services to maximize level of safety and independence with self-care tasks.    . Performance deficits affecting function are weakness, impaired endurance, impaired self care skills, impaired functional mobility, decreased upper extremity function, impaired cardiopulmonary response to activity, gait instability.     Rehab Prognosis:  Good; patient would benefit from acute skilled OT services to address these deficits and reach maximum level of function.       Plan:     Patient to be seen 3 x/week to address the above listed problems via self-care/home management, therapeutic activities, therapeutic exercises, neuromuscular re-education  Plan of Care Expires: 01/25/24  Plan of Care Reviewed with: patient    Subjective     Chief Complaint: Pt. Reported she had a bad night with a lot of acid reflux  Patient/Family Comments/goals: to get better  Pain/Comfort:  Pain Rating 1: 0/10  Pain Rating Post-Intervention 1: 0/10    Objective:     Communicated with: nurse prior to session.  Patient found sitting edge of bed with pulse ox (continuous), telemetry, oxygen, PureWick upon OT entry to room.    General Precautions: Standard, fall, aspiration, seizure    Orthopedic Precautions:N/A  Braces: N/A  Respiratory Status: Nasal cannula, flow 5 L/min     Occupational Performance:     Bed Mobility:    Not tested  as pt. Was sitting EOB     Functional Mobility/Transfers:  Patient completed Sit <> Stand Transfer with stand by assistance  with  rolling walker   Patient completed Bed <> Chair Transfer using Stand Pivot technique with stand by assistance with rolling walker  Patient completed Toilet Transfer Stand Pivot technique with stand by assistance with  rolling walker  Functional Mobility: pt. Ambulated to and from the bathroom with SBA and use of RW    Activities of Daily Living:  Grooming: stand by assistance in stand at sink to brush teeth  Lower Body Dressing: minimum assistance to initially thread LLE into undergarment and pants as well as to steady with management over hips with use of walker  Toileting: minimum assistance for clothing management      [facility] 6 Click ADL: 20    Treatment & Education:  Pt. Educated on role of OT and POC  Pt. Educated on safety with ADL tasks.   Pt. Encouraged to sit up in chair during the day and perform UE/LE exercises as recommended previously    Patient left up in chair with all lines intact, call button in reach, nurse notified, and spouse present    GOALS:   Multidisciplinary Problems       Occupational Therapy Goals          Problem: Occupational Therapy    Goal Priority Disciplines Outcome Interventions   Occupational Therapy Goal     OT, PT/OT Ongoing, Progressing    Description: Goals to be met by: 01-08-24     Patient will increase functional independence with ADLs by performing:    UE Dressing with Set-up Assistance.  LE Dressing with Supervision with AE as needed  Grooming while standing at sink with Supervision.  Toileting from toilet with Supervision for hygiene and clothing management.   Supine to sit with Supervision.  Stand pivot transfers with Supervision with RW  Toilet transfer to toilet with Supervision.                         Time Tracking:     OT Date of Treatment: 12/27/23  OT Start Time: 0855  OT Stop Time: 0926  OT Total Time (min): 31 min    Billable  Minutes:Self Care/Home Management 31    OT/OCTAVIO: OT          12/27/2023

## 2023-12-27 NOTE — CARE UPDATE
"RAPID RESPONSE NURSE AI ALERT       AI alert received.    Chart Reviewed: 12/27/2023, 4:56 AM    MRN: [id number]  Bed: 50315/18366 A    Dx: Acute hypoxic respiratory failure    Jessica Floyd has a past medical history of Allergic rhinitis, Amblyopia, Carotid stenosis, Coronary atherosclerosis, Dyslipidemia, ESBL (extended spectrum beta-lactamase) producing bacteria infection, Hypertension, Nausea and vomiting, Pulmonary emphysema, SAH (subarachnoid hemorrhage), and Subarachnoid hemorrhage due to ruptured aneurysm.    Last VS: BP (!) 152/74   Pulse 87   Temp 98.9 °F (37.2 °C)   Resp (!) 21   Ht 5' 3" (1.6 m)   Wt 54 kg (119 lb 0.8 oz)   SpO2 (!) 91%   Breastfeeding No   BMI 21.09 kg/m²     24H Vital Sign Range:  Temp:  [98.5 °F (36.9 °C)-98.9 °F (37.2 °C)]   Pulse:  []   Resp:  [16-26]   BP: (113-152)/(63-74)   SpO2:  [89 %-98 %]     Level of Consciousness (AVPU): alert    Recent Labs     12/25/23  0451 12/26/23  0513   WBC 31.65* 27.47*   HGB 10.7* 10.6*   HCT 33.1* 32.7*    359       Recent Labs     12/25/23  0451 12/26/23  0513   * 137   K 2.9* 3.2*    97   CO2 20* 29   BUN 13 19   CREATININE 0.9 0.8   * 121*   PHOS  --  2.8   MG  --  1.7        Recent Labs     12/25/23  0506   PH 7.426   PCO2 33.0*   PO2 36*   HCO3 21.7*   POCSATURATED 72   BE -3*        OXYGEN:  Flow (L/min): 6  Oxygen Concentration (%): 30       MEWS score: 2    bedside RNNiyah  contacted.  Pt was expressing anxiety and nausea. RN gave Zofran and Compazine, symptoms improved.   No other concerns verbalized at this time. Instructed to call [phone] for further concerns or assistance.    Tracy Prasad RN       "

## 2023-12-27 NOTE — NURSING
"Pt refusing VS, on monitor for HR and SPO2 c/o  CP and feeling a block in "throat, that sometimes things go down and sometimes it comes back up" Dr Kidd at bedside, Rapid came and assessed pt, new order for Zofran IV given, CXR ordered. CBWR  "

## 2023-12-27 NOTE — ASSESSMENT & PLAN NOTE
This patient does have evidence of infective focus  My overall impression is sepsis.  Source: Respiratory  Antibiotics given-   Antibiotics (72h ago, onward)    Start     Stop Route Frequency Ordered    12/27/23 0400  bacitracin ointment         -- Top 3 times daily 12/27/23 0345    12/26/23 0600  cefTRIAXone (ROCEPHIN) 1 g in dextrose 5 % in water (D5W) 100 mL IVPB (MB+)         -- IV Every 24 hours (non-standard times) 12/25/23 0910        Latest lactate reviewed-  Recent Labs   Lab 12/25/23  0655   LACTATE 0.9       Organ dysfunction indicated by Acute respiratory failure    Fluid challenge Not needed - patient is not hypotensive      Post- resuscitation assessment No - Post resuscitation assessment not needed       Will Not start Pressors- Levophed for MAP of 65  Source control achieved by: Abx    Ceftriaxone and Azithro and Tamiflu. Ua and blood ctx pending

## 2023-12-27 NOTE — CARE UPDATE
"Called to bedside at 8:15 PM    Rapid came and assessed pt, ordered CXR.     Pt had episode of CP and "feeling of food caught in her esophagus". Pt regularly has these episodes when hospitalized. She gets them with solid/liquids. Takes home PPI for GERD. Pt states CP is related to food sensation and feeling of something stuck. Breathing treatment just prior relieved her pain and sensation with vomiting up the food bolus. On exam, pt was sitting on edge of bed in no acute distress. VSS and sating of 6L NC with poor waveform. Denies fever, chills, SOB, CP, abdominal pain, hematemesis, hemoptysis. Has had regular BMs and urination. Ordered IV zofran and will continue to assess. Pending CXR.     Chavez Kidd MD  Internal Medicine PGY-1  [facility]   "

## 2023-12-27 NOTE — RESPIRATORY THERAPY
RAPID RESPONSE RESPIRATORY THERAPY PROACTIVE ROUNDING NOTE             Time of visit: 925     Code Status: Full Code   : 1950  Bed: 91288/86382 A:   MRN: [id number]  Time spent at the bedside: < 15 min    SITUATION    Evaluated patient for: HFNC Compliance     BACKGROUND    Patient has a past medical history of Allergic rhinitis, Amblyopia, Carotid stenosis, Coronary atherosclerosis, Dyslipidemia, ESBL (extended spectrum beta-lactamase) producing bacteria infection, Hypertension, Nausea and vomiting, Pulmonary emphysema, SAH (subarachnoid hemorrhage), and Subarachnoid hemorrhage due to ruptured aneurysm.    24 Hours Vitals Range:  Temp:  [98.7 °F (37.1 °C)-98.9 °F (37.2 °C)]   Pulse:  []   Resp:  [16-37]   BP: (114-152)/(66-78)   SpO2:  [91 %-98 %]     Labs:    Recent Labs     23  0451 23  0513 23  0726   * 137 141   K 2.9* 3.2* 3.0*    97 100   CO2 20* 29 28   BUN 13 19 18   CREATININE 0.9 0.8 0.8   * 121* 97   PHOS  --  2.8 2.8   MG  --  1.7 1.6        Recent Labs     23  0506   PH 7.426   PCO2 33.0*   PO2 36*   HCO3 21.7*   POCSATURATED 72   BE -3*       ASSESSMENT/INTERVENTIONS    Patient weaned on Liter flow previously. Continue weaning as able to tolerate.    Last VS   Temp: 98.7 °F (37.1 °C) (705)  Pulse: 86 (937)  Resp: 37 (937)  BP: 140/78 (705)  SpO2: 94 % (937)    Level of Consciousness: Level of Consciousness (AVPU): alert  Respiratory Effort: Respiratory Effort: Unlabored, Normal Expansion/Accessory Muscle Usage: Expansion/Accessory Muscles/Retractions: no retractions, no use of accessory muscles, expansion symmetric  All Lung Field Breath Sounds: All Lung Fields Breath Sounds: Anterior:, Lateral:, coarse  O2 Device/Concentration: HFNC bubbler 4L  Was the O2 device able to be weaned? Yes  Ambu at bedside:      Active Orders   Respiratory Care    Chest physiotherapy Q4H WAKE     Frequency: Q4H WAKE     Number of  Occurrences: Until Specified     Order Questions:      Indications: COPIOUS SPUTUM PRODUCTION    Incentive spirometry     Frequency: Q4H WAKE     Number of Occurrences: Until Specified    Inhalation Treatment Q4H     Frequency: Q4H     Number of Occurrences: Until Specified    Oxygen Continuous     Frequency: Continuous     Number of Occurrences: Until Specified     Order Questions:      Device type: High flow      Device: High Flow Nasal Cannula (6 -15 Liters)      LPM: 8      Titrate O2 per Oxygen Titration Protocol: Yes      To maintain SpO2 goal of: >= 88%      Notify MD of: Inability to achieve desired SpO2; Sudden change in patient status and requires 20% increase in FiO2; Patient requires >60% FiO2       RECOMMENDATIONS    We recommend: RRT Recs: Continue POC per primary team.      FOLLOW-UP    Please call back the Rapid Response RT, KAREN IRBY RRT at x [phone] for any questions or concerns.

## 2023-12-27 NOTE — ASSESSMENT & PLAN NOTE
Chronic, controlled. Latest blood pressure and vitals reviewed-     Temp:  [97.7 °F (36.5 °C)-98.9 °F (37.2 °C)]   Pulse:  []   Resp:  [16-37]   BP: (140-156)/(74-83)   SpO2:  [91 %-98 %] .   Home meds for hypertension were reviewed and noted below.   Hypertension Medications               amLODIPine (NORVASC) 5 MG tablet TAKE 1 TABLET BY MOUTH EVERY DAY    carvediloL (COREG) 6.25 MG tablet Take 1 tablet (6.25 mg total) by mouth 2 (two) times daily with meals.    hydroCHLOROthiazide (HYDRODIURIL) 25 MG tablet Take 1 tablet (25 mg total) by mouth once daily.            While in the hospital, will manage blood pressure as follows; Adjust home antihypertensive regimen as follows- Continue Coreg, hold amlodipine and monitor bp in setting of sepsis with good BPs    Will utilize p.r.n. blood pressure medication only if patient's blood pressure greater than 180/110 and she develops symptoms such as worsening chest pain or shortness of breath.

## 2023-12-27 NOTE — PROGRESS NOTES
"[facility] ([address] Medicine  Progress Note    Patient Name: Jessica Floyd  MRN: [id number]  Patient Class: IP- Inpatient   Admission Date: 12/25/2023  Length of Stay: 1 days  Attending Physician: Earline Hernández MD  Primary Care Provider: Effie Olmedo MD        Subjective:     Principal Problem:Acute hypoxic respiratory failure        HPI:  73-year-old female with a past medical history of former smoking, CAD, R Carotid stent, dyslipidemia, HTN, gout, anxiety, prev SAH (1999) COPD on home O2 (1L PRN), mild pulmonary hypertension, stress induced reflex syncope, history of ESBL UTI,  now presenting with chief complaint of shortness of breath.  Patient reports that yesterday during the day she experienced worsening shortness of breath and cough.  She has had SOB for weeks, possibly months, and a lingering cough since her COVID infection last month but yesterday had an acute change. Xmas eric she was not feeling well, and around 6 or 7 pm she called ambulence. After some SOB at home and 3L didn't help. Was observed by  to be satting 81 or 82.  Baseline is 94 or 95.     Patient denies any hemoptysis, fevers, or sick contacts. Admits to SAMPSON, productive gray cough, chest tightness with exertion improved with rest, leg swelling, poor exercise tolerance, and balance issues. Reports weight loss of 20-30 lbs since 6 months. Reports decreased PO intake d/t early satiety. Former Smoker, Drinks 2-3 glasses of wine daily. Denies drugs. Family history of emphysema. Vaxed for flu rsv, pna, and covid. Follows with pulm OP. Recent hospitalization 1 month ago for Covid.   claims pt "does not walk at all" and gets SOB after walking to the restroom. Uses asistance from others or cane / walker to ambulate. Claims to have balance issues. Hospitalized June 2023 for a broken hip, claims a table fell on her. She denies falls.     EMS reports that patient had saturation of 80% on room air " requiring 3 L nasal cannula.  Albuterol neb was given by EMS in addition to 125 mg of methylprednisone. In the Ed at presentation her vitals were: /100 P116 RR22 98.5F O2% 93, Initally requiring Bipap, now satting 97 with 8L NC s/p breathing treatment. WBC 31.6 H &H 10.7 33.1  Na 133 K 2.9 CO2 20 Albumin 2.6  Trop WNL Lactate WNL Flu A+ PCO2 33 Po 26 PHCO3 21.7 EKG? Sinus Tach around 115, T wave inversions V3-V5, II, III, AVF, Xray: Patchy right basilar consolidation may relate to infection/pneumonia or aspiration.  Clinical correlation and continued imaging follow-up is recommended. Procal +.     Receive Rocephin, Azithro, Mag, and Steroids in ED. Admitted to [facility] for management of Acute Hypoxic Respiratory Failure.     Overview/Hospital Course:  No notes on file    Interval History: Overnight, patient had an episode of heartburn and chest pain that she described as food getting stuck in her throat. Overnight intern ordered a chest xray and gave antiemetic which alleviated symptoms. Today, patient still complains that it feels like she can't swallow. Reglan was prescribed and will consider barium swallow if patient continues to not be able to get down food or pills. Oxygen is being weaned down appropriately.     Review of Systems   Constitutional:  Negative for chills, diaphoresis, fever and malaise/fatigue.   HENT:  Negative for sore throat.    Eyes:  Negative for double vision.   Respiratory:  Negative for cough, shortness of breath and wheezing.    Cardiovascular:  Negative for chest pain, palpitations, orthopnea, leg swelling and PND.   Gastrointestinal:  Positive for abdominal pain, heartburn and nausea. Negative for blood in stool, constipation and vomiting.   Genitourinary:  Negative for hematuria.   Musculoskeletal:  Negative for falls and myalgias.   Neurological:  Negative for dizziness, loss of consciousness, weakness and headaches.   Psychiatric/Behavioral:  The patient is not  nervous/anxious.            Objective:     Vital Signs (Most Recent):  Temp: 97.7 °F (36.5 °C) (12/27/23 1122)  Pulse: 96 (12/27/23 1122)  Resp: 18 (12/27/23 1122)  BP: (!) 156/83 (12/27/23 1122)  SpO2: (!) 91 % (12/27/23 1122) Vital Signs (24h Range):  Temp:  [97.7 °F (36.5 °C)-98.9 °F (37.2 °C)] 97.7 °F (36.5 °C)  Pulse:  [] 96  Resp:  [16-37] 18  SpO2:  [91 %-98 %] 91 %  BP: (140-156)/(74-83) 156/83     Weight: 54 kg (119 lb 0.8 oz)  Body mass index is 21.09 kg/m².    Intake/Output Summary (Last 24 hours) at 12/27/2023 1316  Last data filed at 12/27/2023 0800  Gross per 24 hour   Intake 220 ml   Output 800 ml   Net -580 ml           Physical Exam  Vitals and nursing note reviewed. Exam conducted with a chaperone present.   Constitutional:       General: She is not in acute distress.     Appearance: She is not ill-appearing, toxic-appearing or diaphoretic.   HENT:      Head: Normocephalic and atraumatic.      Right Ear: External ear normal.      Left Ear: External ear normal.      Mouth/Throat:      Mouth: Mucous membranes are moist.      Pharynx: No oropharyngeal exudate.   Eyes:      Extraocular Movements: Extraocular movements intact.      Pupils: Pupils are equal, round, and reactive to light.   Neck:      Comments: JVD  Cardiovascular:      Rate and Rhythm: Normal rate and regular rhythm.      Pulses: Normal pulses.      Heart sounds: Normal heart sounds. No murmur heard.  Pulmonary:      Effort: Respiratory distress present.      Breath sounds: No wheezing or rales.      Comments: NC in place  Decreased breath sounds RLL  Chest:      Chest wall: No tenderness.   Abdominal:      General: Abdomen is flat. Bowel sounds are normal. There is no distension.      Palpations: Abdomen is soft. There is no mass.      Tenderness: There is no abdominal tenderness.   Musculoskeletal:         General: No swelling or tenderness. Normal range of motion.      Cervical back: Normal range of motion and neck supple.       Right lower leg: No edema.      Left lower leg: No edema.      Comments: Trace edema in BLE   Skin:     General: Skin is warm and dry.      Capillary Refill: Capillary refill takes less than 2 seconds.   Neurological:      General: No focal deficit present.      Mental Status: She is alert and oriented to person, place, and time. Mental status is at baseline.   Psychiatric:         Mood and Affect: Mood normal.         Behavior: Behavior normal.               Significant Labs: All pertinent labs within the past 24 hours have been reviewed.    Significant Imaging: I have reviewed all pertinent imaging results/findings within the past 24 hours.      Assessment/Plan:      * Acute hypoxic respiratory failure  Patient with Hypoxic Respiratory failure which is Acute on chronic.  she is on home oxygen at 1L PRN LPM. Supplemental oxygen was provided and noted-   Signs/symptoms of respiratory failure include- tachypnea, increased work of breathing, respiratory distress, and wheezing. Contributing diagnoses includes - Aspiration, CHF, COPD, and Pneumonia Labs and images were reviewed. Patient Has not had a recent ABG. Will treat underlying causes and adjust management of respiratory failure as follows    73-year-old female with a past medical history of former smoking, CAD, R Carotid stent, dyslipidemia, HTN, gout, anxiety, prev SAH (1999) COPD on home O2 (1L PRN), mild pulmonary hypertension, stress induced reflex syncope, history of ESBL UTI,  now presenting with chief complaint of shortness of breath.  Patient reports that yesterday during the day she experienced worsening shortness of breath and cough.  She has had SOB for weeks, possibly months, and a lingering cough since her COVID infection last month but yesterday had an acute change. Rebel reyes she was not feeling well, and around 6 or 7 pm she called ambulence. After some SOB at home and 3L didn't help. Was observed by  to be satting 81 or 82.  Baseline is 94  "or 95.  Patient denies any hemoptysis, fevers, or sick contacts. Admits to SAMPSON, productive gray cough, chest tightness with exertion improved with rest, leg swelling, poor exercise tolerance, and balance issues. Reports weight loss of 20-30 lbs since 6 months. Vaxed for flu rsv, pna, and covid. Follows with pulm OP. Recent hospitalization 1 month ago for Covid.   claims pt "does not walk at all" and gets SOB after walking to the restroom. Uses asistance from others or cane / walker to ambulate.     EMS reports that patient had saturation of 80% on room air requiring 3 L nasal cannula.  Albuterol neb was given by EMS in addition to 125 mg of methylprednisone. In the Ed at presentation her vitals were: /100 P116 RR22 98.5F O2% 93, Initally requiring Bipap, now satting 97 with 8L NC s/p breathing treatment. WBC 31.6 H &H 10.7 33.1  Na 133 K 2.9 CO2 20 Albumin 2.6  Trop WNL Lactate WNL Flu A+ PCO2 33 Po 26 PHCO3 21.7 EKG? Sinus Tach around 115, T wave inversions V3-V5, II, III, AVF, Xray: Patchy right basilar consolidation may relate to infection/pneumonia or aspiration.  Clinical correlation and continued imaging follow-up is recommended. Procal +.     Receive Rocephin, Azithro, Mag, and Steroids in ED. Admitted to [facility] for management of Acute Hypoxic Respiratory Failure.       Plan  Q4Hr Breathing treatments  Q4H chest physiotherapy  Q4H IS  Q4H Guaifenesin   CAP coverage with Ceftriaxone and Azithro  Tamiflu for influenza   Spot dosing Lasix for volume overload 2/2 HFpEF  Continue home inhalers  Prednisone 40 mg qd  Ween O2 as possible. Still significant       Moderate malnutrition  Nutrition consulted. Most recent weight and BMI monitored-     Measurements:  Wt Readings from Last 1 Encounters:   12/26/23 54 kg (119 lb 0.8 oz)   Body mass index is 21.09 kg/m².    Patient has been screened and assessed by RD.    Malnutrition Type:  Context: acute illness or injury  Level: " moderate    Malnutrition Characteristic Summary:  Weight Loss (Malnutrition): other (see comments) (15% x 8-9 months)  Energy Intake (Malnutrition): less than 75% for greater than or equal to 1 month  Subcutaneous Fat (Malnutrition): moderate depletion  Muscle Mass (Malnutrition): moderate depletion    Interventions/Recommendations (treatment strategy):  1.      Weight loss  20-30lb weight loss, will ask nutrition to see pt. Lung Ct neg, will consider other cancer screening. Pt complains of early saiety, no GI symptoms. Dcreased appetite.     Sepsis  This patient does have evidence of infective focus  My overall impression is sepsis.  Source: Respiratory  Antibiotics given-   Antibiotics (72h ago, onward)      Start     Stop Route Frequency Ordered    12/27/23 0400  bacitracin ointment         -- Top 3 times daily 12/27/23 0345    12/26/23 0600  cefTRIAXone (ROCEPHIN) 1 g in dextrose 5 % in water (D5W) 100 mL IVPB (MB+)         -- IV Every 24 hours (non-standard times) 12/25/23 0910          Latest lactate reviewed-  Recent Labs   Lab 12/25/23  0655   LACTATE 0.9       Organ dysfunction indicated by Acute respiratory failure    Fluid challenge Not needed - patient is not hypotensive      Post- resuscitation assessment No - Post resuscitation assessment not needed       Will Not start Pressors- Levophed for MAP of 65  Source control achieved by: Abx    Ceftriaxone and Azithro and Tamiflu. Ua and blood ctx pending      Pulmonary emphysema  Patient's COPD is with exacerbation noted by continued dyspnea and worsening of baseline hypoxia currently.  Patient is currently off COPD Pathway. Continue scheduled inhalers Steroids, Antibiotics, and Supplemental oxygen and monitor respiratory status closely.     Aortic atherosclerosis        Gout  Continue home meds      Osteoporosis        Hypertension  Chronic, controlled. Latest blood pressure and vitals reviewed-     Temp:  [97.7 °F (36.5 °C)-98.9 °F (37.2 °C)]   Pulse:   []   Resp:  [16-37]   BP: (140-156)/(74-83)   SpO2:  [91 %-98 %] .   Home meds for hypertension were reviewed and noted below.   Hypertension Medications               amLODIPine (NORVASC) 5 MG tablet TAKE 1 TABLET BY MOUTH EVERY DAY    carvediloL (COREG) 6.25 MG tablet Take 1 tablet (6.25 mg total) by mouth 2 (two) times daily with meals.    hydroCHLOROthiazide (HYDRODIURIL) 25 MG tablet Take 1 tablet (25 mg total) by mouth once daily.            While in the hospital, will manage blood pressure as follows; Adjust home antihypertensive regimen as follows- Continue Coreg, hold amlodipine and monitor bp in setting of sepsis with good BPs    Will utilize p.r.n. blood pressure medication only if patient's blood pressure greater than 180/110 and she develops symptoms such as worsening chest pain or shortness of breath.    Gastroesophageal reflux disease without esophagitis  Continue home meds        Dyslipidemia  Continue home meds        Coronary artery disease involving native coronary artery of native heart without angina pectoris  Patient with known CAD. Will continue Statin, ASA at home for primary prevention will cosnider holding. SAMPSON and some chest discomfort with exertion, will conisder stress test      VTE Risk Mitigation (From admission, onward)           Ordered     enoxaparin injection 40 mg  Every 24 hours         12/26/23 1651     IP VTE HIGH RISK PATIENT  Once         12/25/23 0634     Place sequential compression device  Until discontinued         12/25/23 0634                    Discharge Planning   DEBBIE: 1/1/2024     Code Status: Full Code   Is the patient medically ready for discharge?: No    Reason for patient still in hospital (select all that apply): Patient trending condition and Treatment  Discharge Plan A: Home Health          Rick Ventura DO  Department of Hospital Medicine   [facility] ([place] [id number])                      12/27/2023                             STAFF  PHYSICIAN NOTE                                   Attending Attestation for Rounds with Resident  I have reviewed and concur with the resident's history, physical, assessment, and plan.  I have personally interviewed and examined the patient at bedside and agree with the resident's findings.          74yo F, CAD, Right carotid stent, Gout, Anxiety, SAH 1999, on home O2 1 liter NC, Hx of ESBL UTI, Anemia, COVID 11/2023, urinary incontinence,  coming in for pneumonia complicated by COPD exacerbation. Has pulm HTN. Former smoker, drinks wine daily. Acute resp. failure & SEPSIS upon arrival. + FLU A. Initially on BiPAP now transitioning to NC.received duonebs, methylpred by EMS, ceftriaxone,Tamilflu,  and azithro. Continuing duonebs, steroids. N&V. On 6 liters per NC. 91% sat.                                Earline Hernández MD  Senior Hospitalist  Department of Hospital Medicine  [facility]  [id number], [phone]

## 2023-12-27 NOTE — CARE UPDATE
RAPID RESPONSE NURSE PROACTIVE ROUNDING NOTE       Time of Visit:     Admit Date: 2023  LOS: 0  Code Status: Full Code   Date of Visit: 2023  : 1950  Age: 73 y.o.  Sex: female  Race: White  Bed: 16287/88211 A:   MRN: [id number]  Was the patient discharged from an ICU this admission? No   Was the patient discharged from a PACU within last 24 hours? No   Did the patient receive conscious sedation/general anesthesia in last 24 hours? No  Was the patient in the ED within the past 24 hours? No  Was the patient on NIPPV within the past 24 hours? No   Attending Physician: Earline Hernández MD  Primary Service: [facility] MED 3   Time spent at the bedside: 15 -30 min    SITUATION    Notified by bedside RN via phone call.  Reason for alert: chest pain after eating  Called to evaluate the patient for Circulatory    BACKGROUND     Why is the patient in the hospital?: Acute hypoxic respiratory failure    Patient has a past medical history of Allergic rhinitis, Amblyopia, Carotid stenosis, Coronary atherosclerosis, Dyslipidemia, ESBL (extended spectrum beta-lactamase) producing bacteria infection, Hypertension, Nausea and vomiting, Pulmonary emphysema, SAH (subarachnoid hemorrhage), and Subarachnoid hemorrhage due to ruptured aneurysm.    Last Vitals:  Temp: 98.9 °F (37.2 °C) ( 1200)  Pulse: 99 (1943)  Resp: 18 (1943)  BP: 114/66 ( 1200)  SpO2: 92 % (1943)    24 Hours Vitals Range:  Temp:  [98.5 °F (36.9 °C)-98.9 °F (37.2 °C)]   Pulse:  []   Resp:  [18-23]   BP: (113-131)/(63-72)   SpO2:  [89 %-95 %]     Labs:  Recent Labs     23  0451 23  0513   WBC 31.65* 27.47*   HGB 10.7* 10.6*   HCT 33.1* 32.7*    359       Recent Labs     23  0451 23  0513   * 137   K 2.9* 3.2*    97   CO2 20* 29   BUN 13 19   CREATININE 0.9 0.8   * 121*   PHOS  --  2.8   MG  --  1.7        Recent Labs     23  0506   PH 7.426   PCO2 33.0*   PO2 36*  "  HCO3 21.7*   POCSATURATED 72   BE -3*        ASSESSMENT    Physical Exam  Constitutional:       Appearance: She is ill-appearing.   Cardiovascular:      Rate and Rhythm: Tachycardia present.      Pulses: Normal pulses.   Pulmonary:      Effort: Tachypnea present.      Breath sounds: Rales present.   Skin:     General: Skin is dry.      Coloration: Skin is pale.      Findings: Bruising present.   Neurological:      Mental Status: She is alert and oriented to person, place, and time.      GCS: GCS eye subscore is 4. GCS verbal subscore is 5. GCS motor subscore is 6.   Psychiatric:         Mood and Affect: Mood is anxious.       RR 26, SpO2 92% on 6L HFNC    Patient sitting up on side of bed, awake and alert. States she has chest pain when she tries to swallow some food, and frequently has nausea and vomiting after trying to eat. States "this happens every time I'm in the hospital" and that it feels like food is caught in her chest/throat. Denies any bloody emesis, having regular bowel movements. Bedside RN to administer IV anti nausea medication    INTERVENTIONS    The patient was seen for Cardiac problem. Staff concerns included chest pain. The following interventions were performed: portable chest x-ray.    CXR ordered, MD to come to bedside to assess patient    RECOMMENDATIONS    Follow up with chest xray, consider SLP consult if concerned for aspiration  Continue to monitor VS, respiratory status closely    PROVIDER ESCALATION    Yes/No  Yes    Orders received and case discussed with Dr. Kidd with  .    Disposition: Remain in room [address]281.    FOLLOW-UP    bedside Niyah REDDY  updated on plan of care. Instructed to call the Rapid Response NurseKELSEY RN at [phone] for additional questions or concerns.            "

## 2023-12-27 NOTE — SUBJECTIVE & OBJECTIVE
Interval History: Overnight, patient had an episode of heartburn and chest pain that she described as food getting stuck in her throat. Overnight intern ordered a chest xray and gave antiemetic which alleviated symptoms. Today, patient still complains that it feels like she can't swallow. Reglan was prescribed and will consider barium swallow if patient continues to not be able to get down food or pills. Oxygen is being weaned down appropriately.     Review of Systems   Constitutional:  Negative for chills, diaphoresis, fever and malaise/fatigue.   HENT:  Negative for sore throat.    Eyes:  Negative for double vision.   Respiratory:  Negative for cough, shortness of breath and wheezing.    Cardiovascular:  Negative for chest pain, palpitations, orthopnea, leg swelling and PND.   Gastrointestinal:  Positive for abdominal pain, heartburn and nausea. Negative for blood in stool, constipation and vomiting.   Genitourinary:  Negative for hematuria.   Musculoskeletal:  Negative for falls and myalgias.   Neurological:  Negative for dizziness, loss of consciousness, weakness and headaches.   Psychiatric/Behavioral:  The patient is not nervous/anxious.            Objective:     Vital Signs (Most Recent):  Temp: 97.7 °F (36.5 °C) (12/27/23 1122)  Pulse: 96 (12/27/23 1122)  Resp: 18 (12/27/23 1122)  BP: (!) 156/83 (12/27/23 1122)  SpO2: (!) 91 % (12/27/23 1122) Vital Signs (24h Range):  Temp:  [97.7 °F (36.5 °C)-98.9 °F (37.2 °C)] 97.7 °F (36.5 °C)  Pulse:  [] 96  Resp:  [16-37] 18  SpO2:  [91 %-98 %] 91 %  BP: (140-156)/(74-83) 156/83     Weight: 54 kg (119 lb 0.8 oz)  Body mass index is 21.09 kg/m².    Intake/Output Summary (Last 24 hours) at 12/27/2023 1316  Last data filed at 12/27/2023 0800  Gross per 24 hour   Intake 220 ml   Output 800 ml   Net -580 ml           Physical Exam  Vitals and nursing note reviewed. Exam conducted with a chaperone present.   Constitutional:       General: She is not in acute distress.      Appearance: She is not ill-appearing, toxic-appearing or diaphoretic.   HENT:      Head: Normocephalic and atraumatic.      Right Ear: External ear normal.      Left Ear: External ear normal.      Mouth/Throat:      Mouth: Mucous membranes are moist.      Pharynx: No oropharyngeal exudate.   Eyes:      Extraocular Movements: Extraocular movements intact.      Pupils: Pupils are equal, round, and reactive to light.   Neck:      Comments: JVD  Cardiovascular:      Rate and Rhythm: Normal rate and regular rhythm.      Pulses: Normal pulses.      Heart sounds: Normal heart sounds. No murmur heard.  Pulmonary:      Effort: Respiratory distress present.      Breath sounds: No wheezing or rales.      Comments: NC in place  Decreased breath sounds RLL  Chest:      Chest wall: No tenderness.   Abdominal:      General: Abdomen is flat. Bowel sounds are normal. There is no distension.      Palpations: Abdomen is soft. There is no mass.      Tenderness: There is no abdominal tenderness.   Musculoskeletal:         General: No swelling or tenderness. Normal range of motion.      Cervical back: Normal range of motion and neck supple.      Right lower leg: No edema.      Left lower leg: No edema.      Comments: Trace edema in BLE   Skin:     General: Skin is warm and dry.      Capillary Refill: Capillary refill takes less than 2 seconds.   Neurological:      General: No focal deficit present.      Mental Status: She is alert and oriented to person, place, and time. Mental status is at baseline.   Psychiatric:         Mood and Affect: Mood normal.         Behavior: Behavior normal.               Significant Labs: All pertinent labs within the past 24 hours have been reviewed.    Significant Imaging: I have reviewed all pertinent imaging results/findings within the past 24 hours.

## 2023-12-27 NOTE — NURSING
12/26 2015 pt with episodes of intense heart burn/nausea/vomiting- marycruz consulted, Dr Kidd called, both arrived to room quickly-CXR and IV Zofran given, relief obtained. Next episode around 4 am, Rapid made aware after, DR Kidd contacted for new orders, new orders obtained

## 2023-12-27 NOTE — ASSESSMENT & PLAN NOTE
"Patient with Hypoxic Respiratory failure which is Acute on chronic.  she is on home oxygen at 1L PRN LPM. Supplemental oxygen was provided and noted-   Signs/symptoms of respiratory failure include- tachypnea, increased work of breathing, respiratory distress, and wheezing. Contributing diagnoses includes - Aspiration, CHF, COPD, and Pneumonia Labs and images were reviewed. Patient Has not had a recent ABG. Will treat underlying causes and adjust management of respiratory failure as follows    73-year-old female with a past medical history of former smoking, CAD, R Carotid stent, dyslipidemia, HTN, gout, anxiety, prev SAH (1999) COPD on home O2 (1L PRN), mild pulmonary hypertension, stress induced reflex syncope, history of ESBL UTI,  now presenting with chief complaint of shortness of breath.  Patient reports that yesterday during the day she experienced worsening shortness of breath and cough.  She has had SOB for weeks, possibly months, and a lingering cough since her COVID infection last month but yesterday had an acute change. Xmas eric she was not feeling well, and around 6 or 7 pm she called ambulence. After some SOB at home and 3L didn't help. Was observed by  to be satting 81 or 82.  Baseline is 94 or 95.  Patient denies any hemoptysis, fevers, or sick contacts. Admits to SAMPSON, productive gray cough, chest tightness with exertion improved with rest, leg swelling, poor exercise tolerance, and balance issues. Reports weight loss of 20-30 lbs since 6 months. Vaxed for flu rsv, pna, and covid. Follows with pulm OP. Recent hospitalization 1 month ago for Covid.   claims pt "does not walk at all" and gets SOB after walking to the restroom. Uses asistance from others or cane / walker to ambulate.     EMS reports that patient had saturation of 80% on room air requiring 3 L nasal cannula.  Albuterol neb was given by EMS in addition to 125 mg of methylprednisone. In the Ed at presentation her vitals were: " /100 P116 RR22 98.5F O2% 93, Initally requiring Bipap, now satting 97 with 8L NC s/p breathing treatment. WBC 31.6 H &H 10.7 33.1  Na 133 K 2.9 CO2 20 Albumin 2.6  Trop WNL Lactate WNL Flu A+ PCO2 33 Po 26 PHCO3 21.7 EKG? Sinus Tach around 115, T wave inversions V3-V5, II, III, AVF, Xray: Patchy right basilar consolidation may relate to infection/pneumonia or aspiration.  Clinical correlation and continued imaging follow-up is recommended. Procal +.     Receive Rocephin, Azithro, Mag, and Steroids in ED. Admitted to [facility] for management of Acute Hypoxic Respiratory Failure.       Plan  Q4Hr Breathing treatments  Q4H chest physiotherapy  Q4H IS  Q4H Guaifenesin   CAP coverage with Ceftriaxone and Azithro  Tamiflu for influenza   Spot dosing Lasix for volume overload 2/2 HFpEF  Continue home inhalers  Prednisone 40 mg qd  Ween O2 as possible. Still significant

## 2023-12-27 NOTE — PLAN OF CARE
[facility] ([place] [address])  Initial Discharge Assessment       Primary Care Provider: Effie Olmedo MD    Admission Diagnosis: Shortness of breath [R06.02]  Influenza A [J10.1]  Hypoxia [R09.02]  COPD exacerbation [J44.1]  Chest pain [R07.9]    Admission Date: 12/25/2023  Expected Discharge Date: 1/1/2024    Transition of Care Barriers: (P) None    Payor: HU[employer] MANAGED MEDICARE / Plan: HU[employer] MEDICARE HMO / Product Type: Capitation /     Extended Emergency Contact Information  Primary Emergency Contact: Sandie Floyd  Address: [address] [place] [address] [facility]  Home Phone: [phone]  Work Phone: [phone]  Mobile Phone: [phone]  Relation: Spouse   needed? No  Secondary Emergency Contact: Ramila Floyd  Address: [address] [place] [address] [place]  Mobile Phone: [phone]  Relation: Daughter    Discharge Plan A: (P) Home Health  Discharge Plan B: (P) Skilled Nursing Facility      [id number] [place] [place] [facility] [address] [facility]  [address] [facility] [address]  Phone: [phone] Fax: [phone]    [facility] - [facility] [address] [facility]  [address] [facility] [address]  Phone: [phone] Fax: [phone]      Initial Assessment (most recent)       Adult Discharge Assessment - 12/27/23 1219          Discharge Assessment    Assessment Type Discharge Planning Assessment (P)      Confirmed/corrected address, phone number and insurance Yes (P)      Confirmed Demographics Correct on Facesheet (P)      Source of Information family (P)      If unable to respond/provide information was family/caregiver contacted? Yes (P)      Contact Name/Number Michelle Floyd [phone] (P)      Communicated DEBBIE with patient/caregiver Other (see comments) (P)    Date tentative    Reason For Admission Acute Hypoxic Respiratory Failure (P)      People in  Home spouse (P)      Facility Arrived From: Home (P)      Do you expect to return to your current living situation? Yes (P)      Do you have help at home or someone to help you manage your care at home? Yes (P)      Who are your caregiver(s) and their phone number(s)?  Sandie Floyd [phone] (P)      Prior to hospitilization cognitive status: Alert/Oriented (P)      Current cognitive status: Unable to Assess (P)      Walking or Climbing Stairs Difficulty yes (P)      Walking or Climbing Stairs ambulation difficulty, requires equipment (P)      Mobility Management Rolling walker, cane (P)      Dressing/Bathing Difficulty yes (P)      Dressing/Bathing bathing difficulty, assistance 1 person (P)      Home Accessibility not wheelchair accessible (P)      Home Layout Able to live on 1st floor (P)      Equipment Currently Used at Home bedside commode;cane, straight;walker, rolling;oxygen (P)      Readmission within 30 days? No (P)      Patient currently being followed by outpatient case management? No (P)      Do you currently have service(s) that help you manage your care at home? No (P)      Do you take prescription medications? Yes (P)      Do you have prescription coverage? Yes (P)      Coverage [employer] Medicare (P)      Do you have any problems affording any of your prescribed medications? No (P)      Is the patient taking medications as prescribed? yes (P)      How do you get to doctors appointments? family or friend will provide (P)      Are you on dialysis? No (P)      Do you take coumadin? No (P)      Discharge Plan A Home Health (P)      Discharge Plan B Skilled Nursing Facility (P)      DME Needed Upon Discharge  -- (P)    TBD; Pt's  requests a wheelchair, MD notified    Discharge Plan discussed with: Spouse/sig other (P)      Name(s) and Number(s)  Sandie Floyd [phone] (P)      Transition of Care Barriers None (P)         OTHER    Name(s) of People in Home  Sandie Floyd  "[phone] (P)                    Discharge Plan A and Plan B have been determined by review of patient's clinical status, future medical and therapeutic needs, and coverage/benefits for post-acute care in coordination with multidisciplinary team members.    Pt lives with her  in a one story home with 5 EMILY. Pt was mostly independent with ADL's prior to admission but uses a rolling walker, and  assists with care. She is not on dialysis or Coumadin. Pt has transportation home with family.     Pt was hospitalized in November, insurance denied home health services. Pt's family paid for private " services", which is not covered by insurance ([facility]). Pt was discharged from those services.      requests a wheelchair, BRYAN sent MD a note, Pt may not qualify.    BRYAN will follow for all discharge planning needs.     IGLESIA Wright, LMSW [facility]  [id number]               "

## 2023-12-27 NOTE — ASSESSMENT & PLAN NOTE
Nutrition consulted. Most recent weight and BMI monitored-     Measurements:  Wt Readings from Last 1 Encounters:   12/26/23 54 kg (119 lb 0.8 oz)   Body mass index is 21.09 kg/m².    Patient has been screened and assessed by RD.    Malnutrition Type:  Context: acute illness or injury  Level: moderate    Malnutrition Characteristic Summary:  Weight Loss (Malnutrition): other (see comments) (15% x 8-9 months)  Energy Intake (Malnutrition): less than 75% for greater than or equal to 1 month  Subcutaneous Fat (Malnutrition): moderate depletion  Muscle Mass (Malnutrition): moderate depletion    Interventions/Recommendations (treatment strategy):  1.

## 2023-12-28 PROBLEM — J10.1 INFLUENZA A: Status: ACTIVE | Noted: 2023-12-28

## 2023-12-28 LAB
ALBUMIN SERPL BCP-MCNC: 2.8 G/DL (ref 3.5–5.2)
ALP SERPL-CCNC: 88 U/L (ref 55–135)
ALT SERPL W/O P-5'-P-CCNC: 18 U/L (ref 10–44)
ANION GAP SERPL CALC-SCNC: 13 MMOL/L (ref 8–16)
AST SERPL-CCNC: 27 U/L (ref 10–40)
BASOPHILS # BLD AUTO: 0.1 K/UL (ref 0–0.2)
BASOPHILS NFR BLD: 1 % (ref 0–1.9)
BILIRUB SERPL-MCNC: 0.7 MG/DL (ref 0.1–1)
BUN SERPL-MCNC: 20 MG/DL (ref 8–23)
CALCIUM SERPL-MCNC: 9.7 MG/DL (ref 8.7–10.5)
CHLORIDE SERPL-SCNC: 98 MMOL/L (ref 95–110)
CO2 SERPL-SCNC: 31 MMOL/L (ref 23–29)
CREAT SERPL-MCNC: 0.8 MG/DL (ref 0.5–1.4)
DIFFERENTIAL METHOD BLD: ABNORMAL
EOSINOPHIL # BLD AUTO: 0 K/UL (ref 0–0.5)
EOSINOPHIL NFR BLD: 0.1 % (ref 0–8)
ERYTHROCYTE [DISTWIDTH] IN BLOOD BY AUTOMATED COUNT: 18 % (ref 11.5–14.5)
EST. GFR  (NO RACE VARIABLE): >60 ML/MIN/1.73 M^2
GLUCOSE SERPL-MCNC: 93 MG/DL (ref 70–110)
HCT VFR BLD AUTO: 38.2 % (ref 37–48.5)
HGB BLD-MCNC: 12.1 G/DL (ref 12–16)
IMM GRANULOCYTES # BLD AUTO: 0.21 K/UL (ref 0–0.04)
IMM GRANULOCYTES NFR BLD AUTO: 2 % (ref 0–0.5)
LYMPHOCYTES # BLD AUTO: 2 K/UL (ref 1–4.8)
LYMPHOCYTES NFR BLD: 19 % (ref 18–48)
MAGNESIUM SERPL-MCNC: 1.8 MG/DL (ref 1.6–2.6)
MCH RBC QN AUTO: 34.7 PG (ref 27–31)
MCHC RBC AUTO-ENTMCNC: 31.7 G/DL (ref 32–36)
MCV RBC AUTO: 110 FL (ref 82–98)
MONOCYTES # BLD AUTO: 1.2 K/UL (ref 0.3–1)
MONOCYTES NFR BLD: 11 % (ref 4–15)
NEUTROPHILS # BLD AUTO: 7 K/UL (ref 1.8–7.7)
NEUTROPHILS NFR BLD: 66.9 % (ref 38–73)
NRBC BLD-RTO: 0 /100 WBC
PHOSPHATE SERPL-MCNC: 4.1 MG/DL (ref 2.7–4.5)
PLATELET # BLD AUTO: 405 K/UL (ref 150–450)
PMV BLD AUTO: 10.2 FL (ref 9.2–12.9)
POTASSIUM SERPL-SCNC: 3.2 MMOL/L (ref 3.5–5.1)
PROT SERPL-MCNC: 7.5 G/DL (ref 6–8.4)
RBC # BLD AUTO: 3.49 M/UL (ref 4–5.4)
SODIUM SERPL-SCNC: 142 MMOL/L (ref 136–145)
WBC # BLD AUTO: 10.5 K/UL (ref 3.9–12.7)

## 2023-12-28 PROCEDURE — 25000003 PHARM REV CODE 250: Mod: HCNC

## 2023-12-28 PROCEDURE — 97116 GAIT TRAINING THERAPY: CPT | Mod: HCNC,CQ

## 2023-12-28 PROCEDURE — 94640 AIRWAY INHALATION TREATMENT: CPT | Mod: HCNC

## 2023-12-28 PROCEDURE — 36415 COLL VENOUS BLD VENIPUNCTURE: CPT | Mod: HCNC

## 2023-12-28 PROCEDURE — 94799 UNLISTED PULMONARY SVC/PX: CPT | Mod: HCNC,XB

## 2023-12-28 PROCEDURE — 80053 COMPREHEN METABOLIC PANEL: CPT | Mod: HCNC

## 2023-12-28 PROCEDURE — 97110 THERAPEUTIC EXERCISES: CPT | Mod: HCNC,CQ

## 2023-12-28 PROCEDURE — 94761 N-INVAS EAR/PLS OXIMETRY MLT: CPT | Mod: HCNC

## 2023-12-28 PROCEDURE — 99900035 HC TECH TIME PER 15 MIN (STAT): Mod: HCNC

## 2023-12-28 PROCEDURE — 94664 DEMO&/EVAL PT USE INHALER: CPT | Mod: HCNC

## 2023-12-28 PROCEDURE — 20600001 HC STEP DOWN PRIVATE ROOM: Mod: HCNC

## 2023-12-28 PROCEDURE — 63600175 PHARM REV CODE 636 W HCPCS: Mod: HCNC

## 2023-12-28 PROCEDURE — 27100171 HC OXYGEN HIGH FLOW UP TO 24 HOURS: Mod: HCNC

## 2023-12-28 PROCEDURE — 27000207 HC ISOLATION: Mod: HCNC

## 2023-12-28 PROCEDURE — 85025 COMPLETE CBC W/AUTO DIFF WBC: CPT | Mod: HCNC

## 2023-12-28 PROCEDURE — 83735 ASSAY OF MAGNESIUM: CPT | Mod: HCNC

## 2023-12-28 PROCEDURE — 84100 ASSAY OF PHOSPHORUS: CPT | Mod: HCNC

## 2023-12-28 PROCEDURE — 27000646 HC AEROBIKA DEVICE: Mod: HCNC

## 2023-12-28 PROCEDURE — 94668 MNPJ CHEST WALL SBSQ: CPT | Mod: HCNC

## 2023-12-28 PROCEDURE — 25000003 PHARM REV CODE 250: Mod: HCNC | Performed by: HOSPITALIST

## 2023-12-28 PROCEDURE — 25000242 PHARM REV CODE 250 ALT 637 W/ HCPCS: Mod: HCNC | Performed by: HOSPITALIST

## 2023-12-28 RX ORDER — METOCLOPRAMIDE 5 MG/1
10 TABLET ORAL
Status: DISCONTINUED | OUTPATIENT
Start: 2023-12-28 | End: 2023-12-29 | Stop reason: HOSPADM

## 2023-12-28 RX ORDER — POTASSIUM CHLORIDE 7.45 MG/ML
10 INJECTION INTRAVENOUS
Status: DISPENSED | OUTPATIENT
Start: 2023-12-28 | End: 2023-12-28

## 2023-12-28 RX ORDER — PREDNISONE 20 MG/1
40 TABLET ORAL DAILY
Qty: 1 TABLET | Refills: 0 | Status: CANCELLED | OUTPATIENT
Start: 2023-12-29 | End: 2023-12-30

## 2023-12-28 RX ORDER — GUAIFENESIN 100 MG/5ML
200 SOLUTION ORAL EVERY 4 HOURS
Qty: 236 ML | Refills: 0 | Status: CANCELLED
Start: 2023-12-28 | End: 2023-12-30

## 2023-12-28 RX ORDER — ALUMINUM HYDROXIDE, MAGNESIUM HYDROXIDE, AND SIMETHICONE 2400; 240; 2400 MG/30ML; MG/30ML; MG/30ML
30 SUSPENSION ORAL EVERY 6 HOURS PRN
Status: DISCONTINUED | OUTPATIENT
Start: 2023-12-28 | End: 2023-12-29 | Stop reason: HOSPADM

## 2023-12-28 RX ORDER — AMOXICILLIN AND CLAVULANATE POTASSIUM 875; 125 MG/1; MG/1
1 TABLET, FILM COATED ORAL EVERY 12 HOURS
Status: DISCONTINUED | OUTPATIENT
Start: 2023-12-29 | End: 2023-12-29 | Stop reason: HOSPADM

## 2023-12-28 RX ADMIN — GUAIFENESIN 200 MG: 200 SOLUTION ORAL at 02:12

## 2023-12-28 RX ADMIN — GUAIFENESIN 200 MG: 200 SOLUTION ORAL at 09:12

## 2023-12-28 RX ADMIN — TIOTROPIUM BROMIDE INHALATION SPRAY 2 PUFF: 3.12 SPRAY, METERED RESPIRATORY (INHALATION) at 08:12

## 2023-12-28 RX ADMIN — IPRATROPIUM BROMIDE AND ALBUTEROL SULFATE 3 ML: .5; 3 SOLUTION RESPIRATORY (INHALATION) at 07:12

## 2023-12-28 RX ADMIN — GUAIFENESIN 200 MG: 200 SOLUTION ORAL at 05:12

## 2023-12-28 RX ADMIN — FOLIC ACID 1 MG: 1 TABLET ORAL at 09:12

## 2023-12-28 RX ADMIN — CEFTRIAXONE 1 G: 1 INJECTION, POWDER, FOR SOLUTION INTRAMUSCULAR; INTRAVENOUS at 05:12

## 2023-12-28 RX ADMIN — POTASSIUM CHLORIDE 10 MEQ: 7.46 INJECTION, SOLUTION INTRAVENOUS at 05:12

## 2023-12-28 RX ADMIN — IPRATROPIUM BROMIDE AND ALBUTEROL SULFATE 3 ML: .5; 3 SOLUTION RESPIRATORY (INHALATION) at 04:12

## 2023-12-28 RX ADMIN — ENOXAPARIN SODIUM 40 MG: 40 INJECTION SUBCUTANEOUS at 06:12

## 2023-12-28 RX ADMIN — POTASSIUM CHLORIDE 10 MEQ: 7.46 INJECTION, SOLUTION INTRAVENOUS at 06:12

## 2023-12-28 RX ADMIN — POTASSIUM CHLORIDE 10 MEQ: 7.46 INJECTION, SOLUTION INTRAVENOUS at 02:12

## 2023-12-28 RX ADMIN — IPRATROPIUM BROMIDE AND ALBUTEROL SULFATE 3 ML: .5; 3 SOLUTION RESPIRATORY (INHALATION) at 12:12

## 2023-12-28 RX ADMIN — POTASSIUM CHLORIDE 10 MEQ: 7.46 INJECTION, SOLUTION INTRAVENOUS at 04:12

## 2023-12-28 RX ADMIN — QUETIAPINE FUMARATE 50 MG: 25 TABLET ORAL at 08:12

## 2023-12-28 RX ADMIN — ATORVASTATIN CALCIUM 80 MG: 40 TABLET, FILM COATED ORAL at 09:12

## 2023-12-28 RX ADMIN — OSELTAMIVIR PHOSPHATE 30 MG: 30 CAPSULE ORAL at 09:12

## 2023-12-28 RX ADMIN — FLUTICASONE FUROATE AND VILANTEROL TRIFENATATE 1 PUFF: 100; 25 POWDER RESPIRATORY (INHALATION) at 08:12

## 2023-12-28 RX ADMIN — POTASSIUM CHLORIDE 10 MEQ: 7.46 INJECTION, SOLUTION INTRAVENOUS at 12:12

## 2023-12-28 RX ADMIN — POTASSIUM BICARBONATE 40 MEQ: 391 TABLET, EFFERVESCENT ORAL at 08:12

## 2023-12-28 RX ADMIN — Medication 100 MG: at 09:12

## 2023-12-28 RX ADMIN — PANTOPRAZOLE SODIUM 40 MG: 40 TABLET, DELAYED RELEASE ORAL at 09:12

## 2023-12-28 RX ADMIN — PREDNISONE 40 MG: 20 TABLET ORAL at 09:12

## 2023-12-28 RX ADMIN — CARVEDILOL 6.25 MG: 3.12 TABLET, FILM COATED ORAL at 06:12

## 2023-12-28 RX ADMIN — CARVEDILOL 6.25 MG: 3.12 TABLET, FILM COATED ORAL at 08:12

## 2023-12-28 RX ADMIN — ASPIRIN 81 MG: 81 TABLET, COATED ORAL at 09:12

## 2023-12-28 RX ADMIN — ALLOPURINOL 200 MG: 100 TABLET ORAL at 09:12

## 2023-12-28 RX ADMIN — ALUMINUM HYDROXIDE, MAGNESIUM HYDROXIDE, AND DIMETHICONE 30 ML: 400; 400; 40 SUSPENSION ORAL at 09:12

## 2023-12-28 RX ADMIN — IPRATROPIUM BROMIDE AND ALBUTEROL SULFATE 3 ML: .5; 3 SOLUTION RESPIRATORY (INHALATION) at 03:12

## 2023-12-28 RX ADMIN — GUAIFENESIN 200 MG: 200 SOLUTION ORAL at 06:12

## 2023-12-28 RX ADMIN — IPRATROPIUM BROMIDE AND ALBUTEROL SULFATE 3 ML: .5; 3 SOLUTION RESPIRATORY (INHALATION) at 08:12

## 2023-12-28 RX ADMIN — BACITRACIN: 500 OINTMENT TOPICAL at 09:12

## 2023-12-28 NOTE — PLAN OF CARE
Problem: Adult Inpatient Plan of Care  Goal: Plan of Care Review  Outcome: Ongoing, Progressing  Goal: Patient-Specific Goal (Individualized)  Outcome: Ongoing, Progressing  Goal: Absence of Hospital-Acquired Illness or Injury  Outcome: Ongoing, Progressing  Goal: Optimal Comfort and Wellbeing  Outcome: Ongoing, Progressing  Goal: Readiness for Transition of Care  Outcome: Ongoing, Progressing     Problem: Impaired Wound Healing  Goal: Optimal Wound Healing  Outcome: Ongoing, Progressing     Problem: Adjustment to Illness (Sepsis/Septic Shock)  Goal: Optimal Coping  Outcome: Ongoing, Progressing     Problem: Bleeding (Sepsis/Septic Shock)  Goal: Absence of Bleeding  Outcome: Ongoing, Progressing     Problem: Glycemic Control Impaired (Sepsis/Septic Shock)  Goal: Blood Glucose Level Within Desired Range  Outcome: Ongoing, Progressing     Problem: Infection Progression (Sepsis/Septic Shock)  Goal: Absence of Infection Signs and Symptoms  Outcome: Ongoing, Progressing     Problem: Nutrition Impaired (Sepsis/Septic Shock)  Goal: Optimal Nutrition Intake  Outcome: Ongoing, Progressing     Problem: Skin Injury Risk Increased  Goal: Skin Health and Integrity  Outcome: Ongoing, Progressing     Problem: Infection  Goal: Absence of Infection Signs and Symptoms  Outcome: Ongoing, Progressing   Pt Aox4, on 3.5L N/C. Pt denies any current pain. Pt ambulatory with walker assist x1. Rounded per facility policy, safety measures in place, call light within reach.

## 2023-12-28 NOTE — SUBJECTIVE & OBJECTIVE
Interval History: No Acute Overnight Events. Patient is afebrile and hemodynamically stable. O2 currently at 4L, will continue to ween; Mylanta and Reglan PRNs in for continued reflux/ abdominal cramps.       Review of Systems   Constitutional:  Negative for chills, diaphoresis, fever and malaise/fatigue.   HENT:  Negative for sore throat.    Eyes:  Negative for double vision.   Respiratory:  Negative for cough, shortness of breath and wheezing.    Cardiovascular:  Negative for chest pain, palpitations, orthopnea, leg swelling and PND.   Gastrointestinal:  Positive for abdominal pain, heartburn and nausea. Negative for blood in stool, constipation and vomiting.   Genitourinary:  Negative for hematuria.   Musculoskeletal:  Negative for falls and myalgias.   Neurological:  Negative for dizziness, loss of consciousness, weakness and headaches.   Psychiatric/Behavioral:  The patient is not nervous/anxious.            Objective:     Vital Signs (Most Recent):  Temp: 98.2 °F (36.8 °C) (12/28/23 0803)  Pulse: 84 (12/28/23 0848)  Resp: (!) 24 (12/28/23 0848)  BP: 119/69 (12/28/23 0803)  SpO2: 98 % (12/28/23 0848) Vital Signs (24h Range):  Temp:  [97.7 °F (36.5 °C)-98.2 °F (36.8 °C)] 98.2 °F (36.8 °C)  Pulse:  [] 84  Resp:  [15-24] 24  SpO2:  [91 %-98 %] 98 %  BP: (119-161)/(69-91) 119/69     Weight: 54 kg (119 lb 0.8 oz)  Body mass index is 21.09 kg/m².    Intake/Output Summary (Last 24 hours) at 12/28/2023 1056  Last data filed at 12/27/2023 1615  Gross per 24 hour   Intake 240 ml   Output 500 ml   Net -260 ml           Physical Exam  Vitals and nursing note reviewed. Exam conducted with a chaperone present.   Constitutional:       General: She is not in acute distress.     Appearance: She is not ill-appearing, toxic-appearing or diaphoretic.   HENT:      Head: Normocephalic and atraumatic.      Right Ear: External ear normal.      Left Ear: External ear normal.      Mouth/Throat:      Mouth: Mucous membranes are  moist.      Pharynx: No oropharyngeal exudate.   Eyes:      Extraocular Movements: Extraocular movements intact.      Pupils: Pupils are equal, round, and reactive to light.   Neck:      Comments: JVD  Cardiovascular:      Rate and Rhythm: Normal rate and regular rhythm.      Pulses: Normal pulses.      Heart sounds: Normal heart sounds. No murmur heard.  Pulmonary:      Effort: Respiratory distress present.      Breath sounds: No wheezing or rales.      Comments: NC in place  Decreased breath sounds RLL  Chest:      Chest wall: No tenderness.   Abdominal:      General: Abdomen is flat. Bowel sounds are normal. There is no distension.      Palpations: Abdomen is soft. There is no mass.      Tenderness: There is no abdominal tenderness.   Musculoskeletal:         General: No swelling or tenderness. Normal range of motion.      Cervical back: Normal range of motion and neck supple.      Right lower leg: No edema.      Left lower leg: No edema.      Comments: Trace edema in BLE   Skin:     General: Skin is warm and dry.      Capillary Refill: Capillary refill takes less than 2 seconds.   Neurological:      General: No focal deficit present.      Mental Status: She is alert and oriented to person, place, and time. Mental status is at baseline.   Psychiatric:         Mood and Affect: Mood normal.         Behavior: Behavior normal.               Significant Labs: All pertinent labs within the past 24 hours have been reviewed.    Significant Imaging: I have reviewed all pertinent imaging results/findings within the past 24 hours.

## 2023-12-28 NOTE — HOSPITAL COURSE
Admitted to [facility] for acute hypoxic respiratory failure. Initally requiring 8L, currently weening as tolerated. On 1L prn at home. Patient has been started on Abx for PNA coverage (Est end date 12/31) initally Iv transitioned to PO, Tamiflu for Influenza A positivity (end date 12/29), prednisone w/ 6 day taper (estimated end date 1/4 ) for COPD exacerbation, and is being diuresis for hypervolemia on PE w/ hx of HFpEF with PRN Lasix. Patient received supportive care via mucinex, Q4H breathing treatments, IS, and chest percussions. Patient back to baseline home oxygen evening of 12/28-12/29 and medically stable for D/C. Will send with remaining Abx & anti viral, steroid taper, prn lasix, nebulizer and HH. Some intermittent complaints of burning chest pain/dysphagia.. Cardiac workup negative and patient responded well to reglan and mylanta prns. Some sinus arrhyhtmia, PAC and PVCs  noted on tele; patient denies any acute sensations of palpitations. Will consider d/c with event monitor. Will place outpatient referrals to cards for HFpEF and possible arrhyhtmia. Will place Pulm Consult for outpatient follow-up and medication optimization. Will place GI referral for outpatient management of dysphagia/gerd. Patient reports satisfaction with care received and feels comfortable with discharge home.

## 2023-12-28 NOTE — RESPIRATORY THERAPY
"RAPID RESPONSE RESPIRATORY THERAPY PROACTIVE ROUNDING NOTE             Time of visit: 0850     Code Status: Full Code   : 1950  Bed: 02679/35770 A:   MRN: [id number]  Time spent at the bedside: < 15 min    SITUATION    Evaluated patient for: HFNC Compliance     BACKGROUND    Patient has a past medical history of Allergic rhinitis, Amblyopia, Carotid stenosis, Coronary atherosclerosis, Dyslipidemia, ESBL (extended spectrum beta-lactamase) producing bacteria infection, Hypertension, Nausea and vomiting, Pulmonary emphysema, SAH (subarachnoid hemorrhage), and Subarachnoid hemorrhage due to ruptured aneurysm.    24 Hours Vitals Range:  Temp:  [97.7 °F (36.5 °C)-98.2 °F (36.8 °C)]   Pulse:  []   Resp:  [15-24]   BP: (119-161)/(69-91)   SpO2:  [94 %-98 %]     Labs:    Recent Labs     23  0513 23  0726 23  0442    141 142   K 3.2* 3.0* 3.2*   CL 97 100 98   CO2 29 28 31*   BUN 19 18 20   CREATININE 0.8 0.8 0.8   * 97 93   PHOS 2.8 2.8 4.1   MG 1.7 1.6 1.8        No results for input(s): "PH", "PCO2", "PO2", "HCO3", "POCSATURATED", "BE" in the last 72 hours.    ASSESSMENT/INTERVENTIONS    Assessed oxygen status    Last VS   Temp: 98.2 °F (36.8 °C) ( 0803)  Pulse: 95 ( 1114)  Resp: 22 ( 0850)  BP: 119/69 ( 0803)  SpO2: 97 % ( 0850)    Level of Consciousness: Level of Consciousness (AVPU): alert  Respiratory Effort: Respiratory Effort: Normal, Unlabored Expansion/Accessory Muscle Usage: Expansion/Accessory Muscles/Retractions: expansion symmetric, no retractions, no use of accessory muscles  All Lung Field Breath Sounds: All Lung Fields Breath Sounds: Anterior:, Posterior:, Lateral:, coarse  O2 Device/Concentration: 4 lpm nasal cannula  Was the O2 device able to be weaned? No  Ambu at bedside:      Active Orders   Respiratory Care    Chest physiotherapy Q4H WAKE     Frequency: Q4H WAKE     Number of Occurrences: Until Specified     Order Questions:      " Indications: COPIOUS SPUTUM PRODUCTION    Inhalation Treatment Q4H     Frequency: Q4H     Number of Occurrences: Until Specified    Oxygen Continuous     Frequency: Continuous     Number of Occurrences: Until Specified     Order Questions:      Device type: High flow      Device: High Flow Nasal Cannula (6 -15 Liters)      LPM: 8      Titrate O2 per Oxygen Titration Protocol: Yes      To maintain SpO2 goal of: >= 88%      Notify MD of: Inability to achieve desired SpO2; Sudden change in patient status and requires 20% increase in FiO2; Patient requires >60% FiO2       RECOMMENDATIONS    We recommend: RRT Recs: Continue POC per primary team.      FOLLOW-UP    Please call back the Rapid Response RT, Keo Castro RRT at x [phone] for any questions or concerns.

## 2023-12-28 NOTE — PT/OT/SLP PROGRESS
"Physical Therapy Treatment    Patient Name:  Jessica Floyd   MRN:  [id number]    Recommendations:     Discharge Recommendations: Low Intensity Therapy  Discharge Equipment Recommendations: none  Barriers to discharge: None    Assessment:     Jessica Floyd is a 73 y.o. female admitted with a medical diagnosis of Acute hypoxic respiratory failure.  She presents with the following impairments/functional limitations: weakness, impaired endurance, impaired self care skills, impaired cardiopulmonary response to activity Pt tolerated treatment session well today. Pt was able to increase distance ambulated and tolerated LE exercises. Patient can still benefit from skilled services within the acute environment and goals remain appropriate.    Rehab Prognosis: Good; patient would benefit from acute skilled PT services to address these deficits and reach maximum level of function.    Recent Surgery: * No surgery found *      Plan:     During this hospitalization, patient to be seen 3 x/week to address the identified rehab impairments via therapeutic activities, gait training, therapeutic exercises, neuromuscular re-education and progress toward the following goals:    Plan of Care Expires:  01/26/24    Subjective     Chief Complaint: None stated   Patient/Family Comments/goals: "I walk with my  and daughter."  Pain/Comfort:  Pain Rating 1: 0/10      Objective:     Communicated with Rn prior to session.  Patient found sitting edge of bed with telemetry, pulse ox (continuous), oxygen upon PT entry to room.     General Precautions: Standard, aspiration, fall, seizure  Orthopedic Precautions: N/A  Braces: N/A  Respiratory Status: Nasal cannula, flow 4 L/min     Functional Mobility:  Transfers:     Sit to Stand:  contact guard assistance with rolling walker  Gait: 40 ft CGA with RW within room.   Pt performed 10 repetitions of seated B LE exercises consisting of: Marching, LAQ, ABD/ADD, heel raises, and toe raises. "         AM-PAC 6 CLICK MOBILITY  Turning over in bed (including adjusting bedclothes, sheets and blankets)?: 3  Sitting down on and standing up from a chair with arms (e.g., wheelchair, bedside commode, etc.): 3  Moving from lying on back to sitting on the side of the bed?: 3  Moving to and from a bed to a chair (including a wheelchair)?: 3  Need to walk in hospital room?: 3  Climbing 3-5 steps with a railing?: 3  Basic Mobility Total Score: 18       Treatment & Education:  Therapist provided instruction and educated for safety during transfers and gait training. As well as proper body mechanics, energy conservation, and fall prevention strategies during tasks listed above, and the effects of prolonged immobility and the importance of performing EOB/OOB activity and exercises to promote healing and reduce recovery time.       Patient left up in chair with all lines intact, call button in reach, and Rn notified..    GOALS:   Multidisciplinary Problems       Physical Therapy Goals          Problem: Physical Therapy    Goal Priority Disciplines Outcome Goal Variances Interventions   Physical Therapy Goal     PT, PT/OT Ongoing, Progressing     Description: Goals to be met by: 24     Patient will increase functional independence with mobility by performin. Supine to sit with [place]  2. Sit to stand transfer with [place]  3. Bed to chair transfer with [place] using LRAD  4. Gait  x 200 feet with Supervision using LRAD.   5. Ascend/descend 6 stair with bilateral Handrails Supervision using LRAD.   6. Lower extremity exercise program x30 reps per handout, with independence                         Time Tracking:     PT Received On: 23  PT Start Time: 908     PT Stop Time: 933  PT Total Time (min): 25 min     Billable Minutes: Gait Training 16 and Therapeutic Exercise 9    Treatment Type: Treatment  PT/PTA: PTA     Number of PTA visits since last PT visit: 2023

## 2023-12-28 NOTE — ASSESSMENT & PLAN NOTE
"Patient with Hypoxic Respiratory failure which is Acute on chronic.  she is on home oxygen at 1L PRN LPM. Supplemental oxygen was provided and noted-   Signs/symptoms of respiratory failure include- tachypnea, increased work of breathing, respiratory distress, and wheezing. Contributing diagnoses includes - Aspiration, CHF, COPD, and Pneumonia Labs and images were reviewed. Patient Has not had a recent ABG. Will treat underlying causes and adjust management of respiratory failure as follows    73-year-old female with a past medical history of former smoking, CAD, R Carotid stent, dyslipidemia, HTN, gout, anxiety, prev SAH (1999) COPD on home O2 (1L PRN), mild pulmonary hypertension, stress induced reflex syncope, history of ESBL UTI,  now presenting with chief complaint of shortness of breath.  Patient reports that yesterday during the day she experienced worsening shortness of breath and cough.  She has had SOB for weeks, possibly months, and a lingering cough since her COVID infection last month but yesterday had an acute change. Xmas eric she was not feeling well, and around 6 or 7 pm she called ambulence. After some SOB at home and 3L didn't help. Was observed by  to be satting 81 or 82.  Baseline is 94 or 95.  Patient denies any hemoptysis, fevers, or sick contacts. Admits to SAMPSON, productive gray cough, chest tightness with exertion improved with rest, leg swelling, poor exercise tolerance, and balance issues. Reports weight loss of 20-30 lbs since 6 months. Vaxed for flu rsv, pna, and covid. Follows with pulm OP. Recent hospitalization 1 month ago for Covid.   claims pt "does not walk at all" and gets SOB after walking to the restroom. Uses asistance from others or cane / walker to ambulate.     EMS reports that patient had saturation of 80% on room air requiring 3 L nasal cannula.  Albuterol neb was given by EMS in addition to 125 mg of methylprednisone. In the Ed at presentation her vitals were: " /100 P116 RR22 98.5F O2% 93, Initally requiring Bipap, now satting 97 with 8L NC s/p breathing treatment. WBC 31.6 H &H 10.7 33.1  Na 133 K 2.9 CO2 20 Albumin 2.6  Trop WNL Lactate WNL Flu A+ PCO2 33 Po 26 PHCO3 21.7 EKG? Sinus Tach around 115, T wave inversions V3-V5, II, III, AVF, Xray: Patchy right basilar consolidation may relate to infection/pneumonia or aspiration.  Clinical correlation and continued imaging follow-up is recommended. Procal +.     Receive Rocephin, Azithro, Mag, and Steroids in ED. Admitted to [facility] for management of Acute Hypoxic Respiratory Failure.       Plan  Q4Hr Breathing treatments  Q4H chest physiotherapy  Q4H IS  Q4H Guaifenesin   CAP coverage with Ceftriaxone and Azithro  Tamiflu for influenza x5 days  Spot dosing Lasix for volume overload 2/2 HFpEF  Continue home inhalers  Prednisone 40 mg qd  Ween O2 as possible. Still significant above baseline

## 2023-12-28 NOTE — PROGRESS NOTES
"[facility] ([address] Medicine  Progress Note    Patient Name: Jessica Floyd  MRN: [id number]  Patient Class: IP- Inpatient   Admission Date: 12/25/2023  Length of Stay: 2 days  Attending Physician: Enrique Salinas MD  Primary Care Provider: Effie Olmedo MD        Subjective:     Principal Problem:Acute hypoxic respiratory failure        HPI:  73-year-old female with a past medical history of former smoking, CAD, R Carotid stent, dyslipidemia, HTN, gout, anxiety, prev SAH (1999) COPD on home O2 (1L PRN), mild pulmonary hypertension, stress induced reflex syncope, history of ESBL UTI,  now presenting with chief complaint of shortness of breath.  Patient reports that yesterday during the day she experienced worsening shortness of breath and cough.  She has had SOB for weeks, possibly months, and a lingering cough since her COVID infection last month but yesterday had an acute change. Xmas eric she was not feeling well, and around 6 or 7 pm she called ambulence. After some SOB at home and 3L didn't help. Was observed by  to be satting 81 or 82.  Baseline is 94 or 95.     Patient denies any hemoptysis, fevers, or sick contacts. Admits to SAMPSON, productive gray cough, chest tightness with exertion improved with rest, leg swelling, poor exercise tolerance, and balance issues. Reports weight loss of 20-30 lbs since 6 months. Reports decreased PO intake d/t early satiety. Former Smoker, Drinks 2-3 glasses of wine daily. Denies drugs. Family history of emphysema. Vaxed for flu rsv, pna, and covid. Follows with pulm OP. Recent hospitalization 1 month ago for Covid.   claims pt "does not walk at all" and gets SOB after walking to the restroom. Uses asistance from others or cane / walker to ambulate. Claims to have balance issues. Hospitalized June 2023 for a broken hip, claims a table fell on her. She denies falls.     EMS reports that patient had saturation of 80% on room air " requiring 3 L nasal cannula.  Albuterol neb was given by EMS in addition to 125 mg of methylprednisone. In the Ed at presentation her vitals were: /100 P116 RR22 98.5F O2% 93, Initally requiring Bipap, now satting 97 with 8L NC s/p breathing treatment. WBC 31.6 H &H 10.7 33.1  Na 133 K 2.9 CO2 20 Albumin 2.6  Trop WNL Lactate WNL Flu A+ PCO2 33 Po 26 PHCO3 21.7 EKG? Sinus Tach around 115, T wave inversions V3-V5, II, III, AVF, Xray: Patchy right basilar consolidation may relate to infection/pneumonia or aspiration.  Clinical correlation and continued imaging follow-up is recommended. Procal +.     Receive Rocephin, Azithro, Mag, and Steroids in ED. Admitted to [facility] for management of Acute Hypoxic Respiratory Failure.     Overview/Hospital Course:  Admitted to [facility] for acute hypoxic respiratory failure. Initally requiring 8L, currently weening as toleated. On 1L prn at home. Patient has been started on Abx for PNA coverage, Tamiflu for Influenza A positivity, and is being diuresis for hypervolemia on PE w/ hx of HFpEF. Q4H breathing treatments, IS, and chest percussions. Some complaints of chest pain/dysphagia. Cardiac workup negative and patient responded well to reglan and mylanta prns. Overall patient is doing well but still has O2 requirements significantly above baseline.     Interval History: No Acute Overnight Events. Patient is afebrile and hemodynamically stable. O2 currently at 4L, will continue to ween; Mylanta and Reglan PRNs in for continued reflux/ abdominal cramps.       Review of Systems   Constitutional:  Negative for chills, diaphoresis, fever and malaise/fatigue.   HENT:  Negative for sore throat.    Eyes:  Negative for double vision.   Respiratory:  Negative for cough, shortness of breath and wheezing.    Cardiovascular:  Negative for chest pain, palpitations, orthopnea, leg swelling and PND.   Gastrointestinal:  Positive for abdominal pain, heartburn and nausea. Negative for  blood in stool, constipation and vomiting.   Genitourinary:  Negative for hematuria.   Musculoskeletal:  Negative for falls and myalgias.   Neurological:  Negative for dizziness, loss of consciousness, weakness and headaches.   Psychiatric/Behavioral:  The patient is not nervous/anxious.            Objective:     Vital Signs (Most Recent):  Temp: 98.2 °F (36.8 °C) (12/28/23 0803)  Pulse: 84 (12/28/23 0848)  Resp: (!) 24 (12/28/23 0848)  BP: 119/69 (12/28/23 0803)  SpO2: 98 % (12/28/23 0848) Vital Signs (24h Range):  Temp:  [97.7 °F (36.5 °C)-98.2 °F (36.8 °C)] 98.2 °F (36.8 °C)  Pulse:  [] 84  Resp:  [15-24] 24  SpO2:  [91 %-98 %] 98 %  BP: (119-161)/(69-91) 119/69     Weight: 54 kg (119 lb 0.8 oz)  Body mass index is 21.09 kg/m².    Intake/Output Summary (Last 24 hours) at 12/28/2023 1056  Last data filed at 12/27/2023 1615  Gross per 24 hour   Intake 240 ml   Output 500 ml   Net -260 ml           Physical Exam  Vitals and nursing note reviewed. Exam conducted with a chaperone present.   Constitutional:       General: She is not in acute distress.     Appearance: She is not ill-appearing, toxic-appearing or diaphoretic.   HENT:      Head: Normocephalic and atraumatic.      Right Ear: External ear normal.      Left Ear: External ear normal.      Mouth/Throat:      Mouth: Mucous membranes are moist.      Pharynx: No oropharyngeal exudate.   Eyes:      Extraocular Movements: Extraocular movements intact.      Pupils: Pupils are equal, round, and reactive to light.   Neck:      Comments: JVD  Cardiovascular:      Rate and Rhythm: Normal rate and regular rhythm.      Pulses: Normal pulses.      Heart sounds: Normal heart sounds. No murmur heard.  Pulmonary:      Effort: Respiratory distress present.      Breath sounds: No wheezing or rales.      Comments: NC in place  Decreased breath sounds RLL  Chest:      Chest wall: No tenderness.   Abdominal:      General: Abdomen is flat. Bowel sounds are normal. There is no  distension.      Palpations: Abdomen is soft. There is no mass.      Tenderness: There is no abdominal tenderness.   Musculoskeletal:         General: No swelling or tenderness. Normal range of motion.      Cervical back: Normal range of motion and neck supple.      Right lower leg: No edema.      Left lower leg: No edema.      Comments: Trace edema in BLE   Skin:     General: Skin is warm and dry.      Capillary Refill: Capillary refill takes less than 2 seconds.   Neurological:      General: No focal deficit present.      Mental Status: She is alert and oriented to person, place, and time. Mental status is at baseline.   Psychiatric:         Mood and Affect: Mood normal.         Behavior: Behavior normal.               Significant Labs: All pertinent labs within the past 24 hours have been reviewed.    Significant Imaging: I have reviewed all pertinent imaging results/findings within the past 24 hours.        Assessment/Plan:      * Acute hypoxic respiratory failure  Patient with Hypoxic Respiratory failure which is Acute on chronic.  she is on home oxygen at 1L PRN LPM. Supplemental oxygen was provided and noted-   Signs/symptoms of respiratory failure include- tachypnea, increased work of breathing, respiratory distress, and wheezing. Contributing diagnoses includes - Aspiration, CHF, COPD, and Pneumonia Labs and images were reviewed. Patient Has not had a recent ABG. Will treat underlying causes and adjust management of respiratory failure as follows    73-year-old female with a past medical history of former smoking, CAD, R Carotid stent, dyslipidemia, HTN, gout, anxiety, prev SAH (1999) COPD on home O2 (1L PRN), mild pulmonary hypertension, stress induced reflex syncope, history of ESBL UTI,  now presenting with chief complaint of shortness of breath.  Patient reports that yesterday during the day she experienced worsening shortness of breath and cough.  She has had SOB for weeks, possibly months, and a  "lingering cough since her COVID infection last month but yesterday had an acute change. Xmas eric she was not feeling well, and around 6 or 7 pm she called ambulence. After some SOB at home and 3L didn't help. Was observed by  to be satting 81 or 82.  Baseline is 94 or 95.  Patient denies any hemoptysis, fevers, or sick contacts. Admits to SAMPSON, productive gray cough, chest tightness with exertion improved with rest, leg swelling, poor exercise tolerance, and balance issues. Reports weight loss of 20-30 lbs since 6 months. Vaxed for flu rsv, pna, and covid. Follows with pulm OP. Recent hospitalization 1 month ago for Covid.   claims pt "does not walk at all" and gets SOB after walking to the restroom. Uses asistance from others or cane / walker to ambulate.     EMS reports that patient had saturation of 80% on room air requiring 3 L nasal cannula.  Albuterol neb was given by EMS in addition to 125 mg of methylprednisone. In the Ed at presentation her vitals were: /100 P116 RR22 98.5F O2% 93, Initally requiring Bipap, now satting 97 with 8L NC s/p breathing treatment. WBC 31.6 H &H 10.7 33.1  Na 133 K 2.9 CO2 20 Albumin 2.6  Trop WNL Lactate WNL Flu A+ PCO2 33 Po 26 PHCO3 21.7 EKG? Sinus Tach around 115, T wave inversions V3-V5, II, III, AVF, Xray: Patchy right basilar consolidation may relate to infection/pneumonia or aspiration.  Clinical correlation and continued imaging follow-up is recommended. Procal +.     Receive Rocephin, Azithro, Mag, and Steroids in ED. Admitted to [facility] for management of Acute Hypoxic Respiratory Failure.       Plan  Q4Hr Breathing treatments  Q4H chest physiotherapy  Q4H IS  Q4H Guaifenesin   CAP coverage with Ceftriaxone and Azithro  Tamiflu for influenza x5 days  Spot dosing Lasix for volume overload 2/2 HFpEF  Continue home inhalers  Prednisone 40 mg qd  Ween O2 as possible. Still significant above baseline      Influenza A  5 days of tamiflu. See [facility]      Moderate malnutrition  Nutrition consulted. Most recent weight and BMI monitored-     Measurements:  Wt Readings from Last 1 Encounters:   12/26/23 54 kg (119 lb 0.8 oz)   Body mass index is 21.09 kg/m².    Patient has been screened and assessed by RD.    Malnutrition Type:  Context: acute illness or injury  Level: moderate    Malnutrition Characteristic Summary:  Weight Loss (Malnutrition): other (see comments) (15% x 8-9 months)  Energy Intake (Malnutrition): less than 75% for greater than or equal to 1 month  Subcutaneous Fat (Malnutrition): moderate depletion  Muscle Mass (Malnutrition): moderate depletion    Interventions/Recommendations (treatment strategy):  1.      Weight loss  20-30lb weight loss, will ask nutrition to see pt. Lung Ct neg, will consider other cancer screening. Pt complains of early saiety, no GI symptoms. Dcreased appetite.     Sepsis  This patient does have evidence of infective focus  My overall impression is sepsis.  Source: Respiratory  Antibiotics given-   Antibiotics (72h ago, onward)      Start     Stop Route Frequency Ordered    12/27/23 0400  bacitracin ointment         -- Top 3 times daily 12/27/23 0345    12/26/23 0600  cefTRIAXone (ROCEPHIN) 1 g in dextrose 5 % in water (D5W) 100 mL IVPB (MB+)         -- IV Every 24 hours (non-standard times) 12/25/23 0910          Latest lactate reviewed-  Recent Labs   Lab 12/25/23  0655   LACTATE 0.9       Organ dysfunction indicated by Acute respiratory failure    Fluid challenge Not needed - patient is not hypotensive      Post- resuscitation assessment No - Post resuscitation assessment not needed       Will Not start Pressors- Levophed for MAP of 65  Source control achieved by: Abx    Ceftriaxone and Azithro and Tamiflu. Ua and blood ctx pending      Pulmonary emphysema  Patient's COPD is with exacerbation noted by continued dyspnea and worsening of baseline hypoxia currently.  Patient is currently off COPD Pathway. Continue  scheduled inhalers Steroids, Antibiotics, and Supplemental oxygen and monitor respiratory status closely.     Aortic atherosclerosis        Gout  Continue home meds      Osteoporosis        Hypertension  Chronic, controlled. Latest blood pressure and vitals reviewed-     Temp:  [97.7 °F (36.5 °C)-98.9 °F (37.2 °C)]   Pulse:  []   Resp:  [16-37]   BP: (140-156)/(74-83)   SpO2:  [91 %-98 %] .   Home meds for hypertension were reviewed and noted below.   Hypertension Medications               amLODIPine (NORVASC) 5 MG tablet TAKE 1 TABLET BY MOUTH EVERY DAY    carvediloL (COREG) 6.25 MG tablet Take 1 tablet (6.25 mg total) by mouth 2 (two) times daily with meals.    hydroCHLOROthiazide (HYDRODIURIL) 25 MG tablet Take 1 tablet (25 mg total) by mouth once daily.            While in the hospital, will manage blood pressure as follows; Adjust home antihypertensive regimen as follows- Continue Coreg, hold amlodipine and monitor bp in setting of sepsis with good BPs    Will utilize p.r.n. blood pressure medication only if patient's blood pressure greater than 180/110 and she develops symptoms such as worsening chest pain or shortness of breath.    Gastroesophageal reflux disease without esophagitis  Continue home meds        Dyslipidemia  Continue home meds        Coronary artery disease involving native coronary artery of native heart without angina pectoris  Patient with known CAD. Will continue Statin, ASA at home for primary prevention will cosnider holding. SAMPSON and some chest discomfort with exertion, will conisder stress test      VTE Risk Mitigation (From admission, onward)           Ordered     enoxaparin injection 40 mg  Every 24 hours         12/26/23 1651     IP VTE HIGH RISK PATIENT  Once         12/25/23 0634     Place sequential compression device  Until discontinued         12/25/23 0634                    Discharge Planning   DEBBIE: 12/29/2023     Code Status: Full Code   Is the patient medically ready  for discharge?: No    Reason for patient still in hospital (select all that apply): Treatment  Discharge Plan A: Home Health                  Alejo Bonilla DO  Department of Hospital Medicine   [facility] - Intensive Care ([place]-[facility])

## 2023-12-29 VITALS
HEART RATE: 95 BPM | HEIGHT: 63 IN | OXYGEN SATURATION: 95 % | TEMPERATURE: 99 F | WEIGHT: 119.06 LBS | RESPIRATION RATE: 20 BRPM | BODY MASS INDEX: 21.09 KG/M2 | DIASTOLIC BLOOD PRESSURE: 88 MMHG | SYSTOLIC BLOOD PRESSURE: 152 MMHG

## 2023-12-29 LAB
ALBUMIN SERPL BCP-MCNC: 3 G/DL (ref 3.5–5.2)
ALP SERPL-CCNC: 77 U/L (ref 55–135)
ALT SERPL W/O P-5'-P-CCNC: 20 U/L (ref 10–44)
ANION GAP SERPL CALC-SCNC: 9 MMOL/L (ref 8–16)
ANISOCYTOSIS BLD QL SMEAR: SLIGHT
AST SERPL-CCNC: 20 U/L (ref 10–40)
BASO STIPL BLD QL SMEAR: ABNORMAL
BASOPHILS # BLD AUTO: 0.01 K/UL (ref 0–0.2)
BASOPHILS NFR BLD: 0.1 % (ref 0–1.9)
BILIRUB SERPL-MCNC: 1.1 MG/DL (ref 0.1–1)
BUN SERPL-MCNC: 19 MG/DL (ref 8–23)
CALCIUM SERPL-MCNC: 9.9 MG/DL (ref 8.7–10.5)
CHLORIDE SERPL-SCNC: 100 MMOL/L (ref 95–110)
CO2 SERPL-SCNC: 32 MMOL/L (ref 23–29)
CREAT SERPL-MCNC: 0.7 MG/DL (ref 0.5–1.4)
DIFFERENTIAL METHOD BLD: ABNORMAL
EOSINOPHIL # BLD AUTO: 0.1 K/UL (ref 0–0.5)
EOSINOPHIL NFR BLD: 1 % (ref 0–8)
ERYTHROCYTE [DISTWIDTH] IN BLOOD BY AUTOMATED COUNT: 17.9 % (ref 11.5–14.5)
EST. GFR  (NO RACE VARIABLE): >60 ML/MIN/1.73 M^2
GLUCOSE SERPL-MCNC: 84 MG/DL (ref 70–110)
HCT VFR BLD AUTO: 38.8 % (ref 37–48.5)
HGB BLD-MCNC: 12.3 G/DL (ref 12–16)
IMM GRANULOCYTES # BLD AUTO: 0.24 K/UL (ref 0–0.04)
IMM GRANULOCYTES NFR BLD AUTO: 1.9 % (ref 0–0.5)
LYMPHOCYTES # BLD AUTO: 2 K/UL (ref 1–4.8)
LYMPHOCYTES NFR BLD: 15.7 % (ref 18–48)
MAGNESIUM SERPL-MCNC: 1.9 MG/DL (ref 1.6–2.6)
MCH RBC QN AUTO: 33.8 PG (ref 27–31)
MCHC RBC AUTO-ENTMCNC: 31.7 G/DL (ref 32–36)
MCV RBC AUTO: 107 FL (ref 82–98)
MONOCYTES # BLD AUTO: 1.9 K/UL (ref 0.3–1)
MONOCYTES NFR BLD: 14.9 % (ref 4–15)
NEUTROPHILS # BLD AUTO: 8.3 K/UL (ref 1.8–7.7)
NEUTROPHILS NFR BLD: 66.4 % (ref 38–73)
NRBC BLD-RTO: 0 /100 WBC
PHOSPHATE SERPL-MCNC: 3.1 MG/DL (ref 2.7–4.5)
PLATELET # BLD AUTO: 430 K/UL (ref 150–450)
PMV BLD AUTO: 9.9 FL (ref 9.2–12.9)
POIKILOCYTOSIS BLD QL SMEAR: SLIGHT
POTASSIUM SERPL-SCNC: 3.6 MMOL/L (ref 3.5–5.1)
PROT SERPL-MCNC: 7.5 G/DL (ref 6–8.4)
RBC # BLD AUTO: 3.64 M/UL (ref 4–5.4)
SODIUM SERPL-SCNC: 141 MMOL/L (ref 136–145)
SPHEROCYTES BLD QL SMEAR: ABNORMAL
TOXIC GRANULES BLD QL SMEAR: PRESENT
WBC # BLD AUTO: 12.45 K/UL (ref 3.9–12.7)

## 2023-12-29 PROCEDURE — 84100 ASSAY OF PHOSPHORUS: CPT | Mod: HCNC

## 2023-12-29 PROCEDURE — 94761 N-INVAS EAR/PLS OXIMETRY MLT: CPT | Mod: HCNC

## 2023-12-29 PROCEDURE — 94640 AIRWAY INHALATION TREATMENT: CPT | Mod: HCNC

## 2023-12-29 PROCEDURE — 85025 COMPLETE CBC W/AUTO DIFF WBC: CPT | Mod: HCNC

## 2023-12-29 PROCEDURE — 97530 THERAPEUTIC ACTIVITIES: CPT | Mod: HCNC,CQ

## 2023-12-29 PROCEDURE — 80053 COMPREHEN METABOLIC PANEL: CPT | Mod: HCNC

## 2023-12-29 PROCEDURE — 99900035 HC TECH TIME PER 15 MIN (STAT): Mod: HCNC

## 2023-12-29 PROCEDURE — 25000003 PHARM REV CODE 250: Mod: HCNC

## 2023-12-29 PROCEDURE — 25000242 PHARM REV CODE 250 ALT 637 W/ HCPCS: Mod: HCNC | Performed by: HOSPITALIST

## 2023-12-29 PROCEDURE — 27000221 HC OXYGEN, UP TO 24 HOURS: Mod: HCNC

## 2023-12-29 PROCEDURE — 36415 COLL VENOUS BLD VENIPUNCTURE: CPT | Mod: HCNC

## 2023-12-29 PROCEDURE — 94664 DEMO&/EVAL PT USE INHALER: CPT | Mod: HCNC

## 2023-12-29 PROCEDURE — 97116 GAIT TRAINING THERAPY: CPT | Mod: HCNC,CQ

## 2023-12-29 PROCEDURE — 83735 ASSAY OF MAGNESIUM: CPT | Mod: HCNC

## 2023-12-29 RX ORDER — FUROSEMIDE 20 MG/1
20 TABLET ORAL DAILY PRN
Qty: 30 TABLET | Refills: 11 | Status: SHIPPED | OUTPATIENT
Start: 2023-12-29 | End: 2024-12-28

## 2023-12-29 RX ORDER — AMOXICILLIN AND CLAVULANATE POTASSIUM 875; 125 MG/1; MG/1
1 TABLET, FILM COATED ORAL 2 TIMES DAILY
Qty: 3 TABLET | Refills: 0 | Status: SHIPPED | OUTPATIENT
Start: 2023-12-29 | End: 2023-12-29

## 2023-12-29 RX ORDER — FUROSEMIDE 20 MG/1
20 TABLET ORAL DAILY
Status: DISCONTINUED | OUTPATIENT
Start: 2023-12-29 | End: 2023-12-29 | Stop reason: HOSPADM

## 2023-12-29 RX ORDER — CALCIUM CARBONATE 200(500)MG
1000 TABLET,CHEWABLE ORAL 2 TIMES DAILY PRN
Start: 2023-12-29 | End: 2024-01-28

## 2023-12-29 RX ORDER — FUROSEMIDE 20 MG/1
20 TABLET ORAL DAILY
Status: DISCONTINUED | OUTPATIENT
Start: 2023-12-29 | End: 2023-12-29

## 2023-12-29 RX ORDER — OSELTAMIVIR PHOSPHATE 30 MG/1
30 CAPSULE ORAL 2 TIMES DAILY
Qty: 1 CAPSULE | Refills: 0 | Status: SHIPPED | OUTPATIENT
Start: 2023-12-29 | End: 2023-12-30

## 2023-12-29 RX ORDER — ALBUTEROL SULFATE 90 UG/1
2 AEROSOL, METERED RESPIRATORY (INHALATION) EVERY 4 HOURS PRN
Qty: 18 G | Refills: 11 | Status: SHIPPED | OUTPATIENT
Start: 2023-12-29 | End: 2023-12-29 | Stop reason: HOSPADM

## 2023-12-29 RX ORDER — IPRATROPIUM BROMIDE AND ALBUTEROL SULFATE 2.5; .5 MG/3ML; MG/3ML
3 SOLUTION RESPIRATORY (INHALATION) EVERY 6 HOURS PRN
Qty: 90 ML | Refills: 0 | Status: SHIPPED | OUTPATIENT
Start: 2023-12-29 | End: 2024-12-28

## 2023-12-29 RX ORDER — AMOXICILLIN AND CLAVULANATE POTASSIUM 875; 125 MG/1; MG/1
1 TABLET, FILM COATED ORAL 2 TIMES DAILY
Qty: 5 TABLET | Refills: 0 | Status: SHIPPED | OUTPATIENT
Start: 2023-12-29 | End: 2024-01-01

## 2023-12-29 RX ORDER — BACITRACIN 500 [USP'U]/G
OINTMENT TOPICAL 3 TIMES DAILY
Refills: 0
Start: 2023-12-29

## 2023-12-29 RX ORDER — METOCLOPRAMIDE 10 MG/1
10 TABLET ORAL
Qty: 20 TABLET | Refills: 0 | Status: SHIPPED | OUTPATIENT
Start: 2023-12-29

## 2023-12-29 RX ORDER — ALUMINUM HYDROXIDE, MAGNESIUM HYDROXIDE, AND SIMETHICONE 2400; 240; 2400 MG/30ML; MG/30ML; MG/30ML
30 SUSPENSION ORAL EVERY 6 HOURS PRN
Qty: 300 ML | Refills: 0
Start: 2023-12-29 | End: 2024-12-28

## 2023-12-29 RX ORDER — GUAIFENESIN 600 MG/1
600 TABLET, EXTENDED RELEASE ORAL 2 TIMES DAILY
Qty: 28 TABLET | Refills: 0
Start: 2023-12-29 | End: 2024-01-12

## 2023-12-29 RX ORDER — PREDNISONE 10 MG/1
TABLET ORAL
Qty: 7 TABLET | Refills: 0 | Status: SHIPPED | OUTPATIENT
Start: 2023-12-29 | End: 2024-01-04

## 2023-12-29 RX ADMIN — IPRATROPIUM BROMIDE AND ALBUTEROL SULFATE 3 ML: .5; 3 SOLUTION RESPIRATORY (INHALATION) at 08:12

## 2023-12-29 RX ADMIN — IPRATROPIUM BROMIDE AND ALBUTEROL SULFATE 3 ML: .5; 3 SOLUTION RESPIRATORY (INHALATION) at 12:12

## 2023-12-29 RX ADMIN — GUAIFENESIN 200 MG: 200 SOLUTION ORAL at 09:12

## 2023-12-29 RX ADMIN — IPRATROPIUM BROMIDE AND ALBUTEROL SULFATE 3 ML: .5; 3 SOLUTION RESPIRATORY (INHALATION) at 04:12

## 2023-12-29 RX ADMIN — TIOTROPIUM BROMIDE INHALATION SPRAY 2 PUFF: 3.12 SPRAY, METERED RESPIRATORY (INHALATION) at 08:12

## 2023-12-29 RX ADMIN — IPRATROPIUM BROMIDE AND ALBUTEROL SULFATE 3 ML: .5; 3 SOLUTION RESPIRATORY (INHALATION) at 11:12

## 2023-12-29 RX ADMIN — FLUTICASONE FUROATE AND VILANTEROL TRIFENATATE 1 PUFF: 100; 25 POWDER RESPIRATORY (INHALATION) at 08:12

## 2023-12-29 NOTE — PLAN OF CARE
[facility] ([facility])  Discharge Final Note    Primary Care Provider: Effie Olmedo MD    Expected Discharge Date: 12/29/2023    Final Discharge Note (most recent)       Final Note - 12/29/23 1043          Final Note    Assessment Type Final Discharge Note (P)      Anticipated Discharge Disposition Home-Health Care Svc (P)      What phone number can be called within the next 1-3 days to see how you are doing after discharge? [phone] (P)      Hospital Resources/Appts/Education Provided Provided patient/caregiver with written discharge plan information;Appointments scheduled and added to AVS (P)         Post-Acute Status    Patient choice form signed by patient/caregiver List from System Post-Acute Care (P)      Discharge Delays None known at this time (P)                      Important Message from Medicare  Important Message from Medicare regarding Discharge Appeal Rights: Signed/date by patient/caregiver     Date IMM was signed: 12/28/23  Time IMM was signed: 1334    Contact Info       Effie Olmedo MD   Specialty: Family Medicine   Relationship: PCP - General    [address] [place], [place] [id number]   Phone: [phone]       Next Steps: Schedule an appointment as soon as possible for a visit          Patient is discharging home with [facility] . Pt's hospital follow up appointments are scheduled and in the Pt's AVS. [facility] accepts PHN, Pt is switching to PHN 1/1/23.     Pt has no other post acute needs and is cleared for discharge from a case management standpoint.     IGLESIA Wright, TIMA  [facility]  [id number]

## 2023-12-29 NOTE — PT/OT/SLP PROGRESS
Occupational Therapy      Patient Name:  Jessica Floyd   MRN:  [id number]    Upon arrival in pt's room  at 1010AM, pt seated in bedside chair fully dressed. Pt states she is going home today and her  will be arriving to pick her up. Pt politely declined and stated she has been walking and no need for therapy at this time. Pt left seated in chair with all needs within reach. Pt appears in NAD. Patient not seen today secondary to hospital discharge. OT will discharge per POC.    JASBIR Nath  12/29/2023

## 2023-12-29 NOTE — DISCHARGE SUMMARY
"[facility] ([address] Medicine  Discharge Summary      Patient Name: Jessica Floyd  MRN: [id number]  LENORA: [id number]  Patient Class: IP- Inpatient  Admission Date: 12/25/2023  Hospital Length of Stay: 3 days  Discharge Date and Time:  12/29/2023 7:59 AM  Attending Physician: Enrique Salinas MD   Discharging Provider: Alejo Bonilla DO  Primary Care Provider: Effie Olmedo MD  [facility] Medicine Team: [facility] [place] Alejo Bonilla DO  Primary Care Team: [facility] [place]    HPI:   73-year-old female with a past medical history of former smoking, CAD, R Carotid stent, dyslipidemia, HTN, gout, anxiety, prev SAH (1999) COPD on home O2 (1L PRN), mild pulmonary hypertension, stress induced reflex syncope, history of ESBL UTI,  now presenting with chief complaint of shortness of breath.  Patient reports that yesterday during the day she experienced worsening shortness of breath and cough.  She has had SOB for weeks, possibly months, and a lingering cough since her COVID infection last month but yesterday had an acute change. Xmas eric she was not feeling well, and around 6 or 7 pm she called ambulence. After some SOB at home and 3L didn't help. Was observed by  to be satting 81 or 82.  Baseline is 94 or 95.     Patient denies any hemoptysis, fevers, or sick contacts. Admits to SAMPSON, productive gray cough, chest tightness with exertion improved with rest, leg swelling, poor exercise tolerance, and balance issues. Reports weight loss of 20-30 lbs since 6 months. Reports decreased PO intake d/t early satiety. Former Smoker, Drinks 2-3 glasses of wine daily. Denies drugs. Family history of emphysema. Vaxed for flu rsv, pna, and covid. Follows with pulm OP. Recent hospitalization 1 month ago for Covid.   claims pt "does not walk at all" and gets SOB after walking to the restroom. Uses asistance from others or cane / walker to ambulate. Claims to have balance issues. Hospitalized June " 2023 for a broken hip, claims a table fell on her. She denies falls.     EMS reports that patient had saturation of 80% on room air requiring 3 L nasal cannula.  Albuterol neb was given by EMS in addition to 125 mg of methylprednisone. In the Ed at presentation her vitals were: /100 P116 RR22 98.5F O2% 93, Initally requiring Bipap, now satting 97 with 8L NC s/p breathing treatment. WBC 31.6 H &H 10.7 33.1  Na 133 K 2.9 CO2 20 Albumin 2.6  Trop WNL Lactate WNL Flu A+ PCO2 33 Po 26 PHCO3 21.7 EKG? Sinus Tach around 115, T wave inversions V3-V5, II, III, AVF, Xray: Patchy right basilar consolidation may relate to infection/pneumonia or aspiration.  Clinical correlation and continued imaging follow-up is recommended. Procal +.     Receive Rocephin, Azithro, Mag, and Steroids in ED. Admitted to [facility] for management of Acute Hypoxic Respiratory Failure.     * No surgery found *      Hospital Course:   Admitted to [facility] for acute hypoxic respiratory failure. Initally requiring 8L, currently weening as tolerated. On 1L prn at home. Patient has been started on Abx for PNA coverage (Est end date 12/31) initally Iv transitioned to PO, Tamiflu for Influenza A positivity (end date 12/29), prednisone w/ 6 day taper (estimated end date 1/4 ) for COPD exacerbation, and is being diuresis for hypervolemia on PE w/ hx of HFpEF with PRN Lasix. Patient received supportive care via mucinex, Q4H breathing treatments, IS, and chest percussions. Patient back to baseline home oxygen evening of 12/28-12/29 and medically stable for D/C. Will send with remaining Abx & anti viral, steroid taper, prn lasix, nebulizer and HH. Some intermittent complaints of burning chest pain/dysphagia.. Cardiac workup negative and patient responded well to reglan and mylanta prns. Some sinus arrhyhtmia, PAC and PVCs  noted on tele; patient denies any acute sensations of palpitations. Will consider d/c with event monitor. Will place outpatient  referrals to cards for HFpEF and possible arrhyhtmia. Will place Pulm Consult for outpatient follow-up and medication optimization. Will place GI referral for outpatient management of dysphagia/gerd. Patient reports satisfaction with care received and feels comfortable with discharge home.      Goals of Care Treatment Preferences:  Code Status: Full Code      Consults:   Consults (From admission, onward)          Status Ordering Provider     Inpatient consult to Skin Integrity  Practitioner  Once        Provider:  Lenore Oconnell NP    Acknowledged EDDIE MONTES     Inpatient consult to Registered Dietitian/Nutritionist  Once        Provider:  (Not yet assigned)    MYLENE Perez            No new Assessment & Plan notes have been filed under this hospital service since the last note was generated.  Service: Hospital Medicine    Final Active Diagnoses:    Diagnosis Date Noted POA    PRINCIPAL PROBLEM:  Acute hypoxic respiratory failure [J96.01] 10/30/2023 Yes    Influenza A [J10.1] 12/28/2023 Yes    Moderate malnutrition [E44.0] 12/26/2023 Yes    Sepsis [A41.9] 12/25/2023 Yes    Weight loss [R63.4] 12/25/2023 Yes    Pulmonary emphysema [J43.9] 11/16/2023 Yes     Chronic    Aortic atherosclerosis [I70.0] 11/07/2023 Yes    Gout [M10.9] 10/16/2023 Yes     Chronic    Osteoporosis [M81.0] 08/02/2023 Yes    Hypertension [I10]  Yes     Chronic    Gastroesophageal reflux disease without esophagitis [K21.9] 03/20/2017 Yes     Chronic    Coronary artery disease involving native coronary artery of native heart without angina pectoris [I25.10] 08/24/2016 Yes     Chronic    Dyslipidemia [E78.5] 08/24/2016 Yes     Chronic      Problems Resolved During this Admission:       Discharged Condition: fair    Disposition: [place]-Health Care [facility] [place]    Follow Up:   Follow-up Information       Effie Olmedo MD. Schedule an appointment as soon as possible for a visit .    Specialty: Family Medicine  Contact information:  [id number] [facility] [address]  [facility] [address]  [phone]                           Patient Instructions:      NEBULIZER KIT (SUPPLIES) FOR HOME USE     Order Specific Question Answer Comments   Height: 5' 3" (1.6 m)    Weight: 54 kg (119 lb 0.8 oz)    Does patient have medical equipment at home? bedside commode    Does patient have medical equipment at home? cane, straight    Does patient have medical equipment at home? walker, rolling    Does patient have medical equipment at home? oxygen    Length of need (1-99 months): 12    Mask or Mouthpiece? Mask      Ambulatory referral/consult to Pulmonology   Standing Status: Future   Referral Priority: Routine Referral Type: Consultation   Referral Reason: Specialty Services Required   Requested Specialty: Pulmonary Disease   Number of Visits Requested: 1     Ambulatory referral/consult to Cardiology   Standing Status: Future   Referral Priority: Routine Referral Type: Consultation   Referral Reason: Specialty Services Required   Requested Specialty: Cardiology   Number of Visits Requested: 1     Ambulatory referral/consult to Gastroenterology   Standing Status: Future   Referral Priority: Routine Referral Type: Consultation   Referral Reason: Specialty Services Required   Requested Specialty: Gastroenterology   Number of Visits Requested: 1     Diet Cardiac     Notify your health care provider if you experience any of the following:  temperature >100.4     Notify your health care provider if you experience any of the following:  persistent nausea and vomiting or diarrhea     Notify your health care provider if you experience any of the following:  severe uncontrolled pain     Notify your health care provider if you experience any of the following:  redness, tenderness, or signs of infection (pain, swelling, redness, odor or green/yellow discharge around incision site)     Notify your health care provider if you experience any of the following:  difficulty breathing " or increased cough     Notify your health care provider if you experience any of the following:  severe persistent headache     Notify your health care provider if you experience any of the following:  worsening rash     Notify your health care provider if you experience any of the following:  persistent dizziness, light-headedness, or visual disturbances     Notify your health care provider if you experience any of the following:  increased confusion or weakness     Activity as tolerated       Significant Diagnostic Studies: N/A    Pending Diagnostic Studies:       None           Medications:  Reconciled Home Medications:      Medication List        START taking these medications      albuterol-ipratropium 2.5 mg-0.5 mg/3 mL nebulizer solution  Commonly known as: DUO-NEB  Take 3 mLs by nebulization every 6 (six) hours as needed for Wheezing. Rescue     aluminum & magnesium hydroxide-simethicone 400-400-40 mg/5 mL suspension  Commonly known as: MYLANTA MAX STRENGTH  Take 30 mLs by mouth every 6 (six) hours as needed.     amoxicillin-clavulanate 875-125mg 875-125 mg per tablet  Commonly known as: AUGMENTIN  Take 1 tablet by mouth 2 (two) times daily. for 5 doses     bacitracin 500 unit/gram ointment  Apply topically 3 (three) times daily.     calcium carbonate 200 mg calcium (500 mg) chewable tablet  Commonly known as: TUMS  Take 2 tablets (1,000 mg total) by mouth 2 (two) times daily as needed for Heartburn.     furosemide 20 MG tablet  Commonly known as: LASIX  Take 1 tablet (20 mg total) by mouth daily as needed (Leg swelling, SOB, Weight gain 3lb in 1 day or 5 lbs in 2 days.).     guaiFENesin 600 mg 12 hr tablet  Commonly known as: MUCINEX  Take 1 tablet (600 mg total) by mouth 2 (two) times daily. for 14 days     metoclopramide HCl 10 MG tablet  Commonly known as: REGLAN  Take 1 tablet (10 mg total) by mouth before meals as needed (Abdominal Pain).     oseltamivir 30 MG capsule  Commonly known as: TAMIFLU  Take  1 capsule (30 mg total) by mouth 2 (two) times daily. for 1 dose     predniSONE 10 MG tablet  Commonly known as: DELTASONE  Take 2 tablets by mouth once daily for 2 days, THEN 1 tablet once daily for 2 days, THEN 0.5 tablets once daily for 2 days. Please take your lasix everyday while you take your prednisone.  Start taking on: December 29, 2023            CONTINUE taking these medications      acetaminophen 650 MG Tbsr  Commonly known as: TYLENOL  Take 1 tablet (650 mg total) by mouth every 6 to 8 hours as needed (pain (mild-moderate)).     allopurinoL 100 MG tablet  Commonly known as: ZYLOPRIM  Take 2 tablets (200 mg total) by mouth once daily.     amLODIPine 5 MG tablet  Commonly known as: NORVASC  TAKE 1 TABLET BY MOUTH EVERY DAY     aspirin 81 MG EC tablet  Commonly known as: ECOTRIN  Take 81 mg by mouth once daily.     atorvastatin 80 MG tablet  Commonly known as: LIPITOR  TAKE 1 TABLET BY MOUTH EVERY DAY     carvediloL 6.25 MG tablet  Commonly known as: COREG  Take 1 tablet (6.25 mg total) by mouth 2 (two) times daily with meals.     celecoxib 200 MG capsule  Commonly known as: CeleBREX  Take 1 capsule (200 mg total) by mouth as needed.     colchicine 0.6 mg tablet  Commonly known as: COLCRYS  Take 1 tablet (0.6 mg total) by mouth once daily. As needed for gout     COMIRNATY 2023-24 (12Y UP)(PF) 30 mcg/0.3 mL inection  Generic drug: COVID obw57-23(12up)(raxt)(PF)     FLUAD QUAD 2023-24(65Y UP)(PF) 60 mcg (15 mcg x 4)/0.5 mL Syrg  Generic drug: flu vac 2023 [id number]-[id number](PF)     fluticasone furoate-vilanteroL 100-25 mcg/dose diskus inhaler  Commonly known as: BREO  Inhale 1 puff into the lungs once daily. Controller     fluticasone propionate 50 mcg/actuation nasal spray  Commonly known as: FLONASE  SPRAY 2 pumps INTO EACH NOSTRIL ONCE DAILY     folic acid 1 MG tablet  Commonly known as: FOLVITE  Take 1 tablet (1 mg total) by mouth once daily.     hydroCHLOROthiazide 25 MG tablet  Commonly known as:  HYDRODIURIL  Take 1 tablet (25 mg total) by mouth once daily.     melatonin 3 mg tablet  Commonly known as: MELATIN  Take 2 tablets (6 mg total) by mouth nightly as needed for Insomnia.     montelukast 10 mg tablet  Commonly known as: SINGULAIR  Take 1 tablet (10 mg total) by mouth every evening.     ORTHOVISC 30 mg/2 mL  Generic drug: sodium hyaluronate (orthovisc)     pantoprazole 40 MG tablet  Commonly known as: PROTONIX  Take 1 tablet (40 mg total) by mouth once daily.     PRENATAL MULTI ORAL  Take 1 tablet by mouth once daily.     QUEtiapine 50 MG tablet  Commonly known as: SEROQUEL  Take 1 tablet (50 mg total) by mouth every evening.     THERA-TABS tablet  Generic drug: multivitamin  Take 1 tablet by mouth once daily.     tiotropium bromide 2.5 mcg/actuation inhaler  Commonly known as: SPIRIVA RESPIMAT  Inhale 2 puffs into the lungs once daily. Controller     VENOFER 100 mg iron/5 mL injection  Generic drug: iron sucrose              Indwelling Lines/Drains at time of discharge:   Lines/Drains/Airways       Drain  Duration             Female External Urinary Catheter w/ Suction 12/25/23 0712 4 days                    Time spent on the discharge of patient: 60 minutes         Alejo Bonilla DO  Department of Hospital Medicine  [facility] - Intensive Care ([place]-14)

## 2023-12-29 NOTE — NURSING
Patient rested comfortably, refused PO meds except seroquel and PRN for indigestion. Stated she would like to see GI before being discharged due to her unrelieved reflux. Oxygen maintained at 1L , patient slightly desats when anxious . She has multiple concerns in regards to discharge. Will defer to dayshift nurse and doctors.   Current 95% on 1L  No vomiting, denies nausea.

## 2023-12-29 NOTE — SUBJECTIVE & OBJECTIVE
Interval History: No Acute Overnight Events. Patient is afebrile and hemodynamically stable. AM labs pending. Weened to RA overnight. Using 1-2 L PRN. C/o of dysphagia/reflux, satisfied with outpatient GI workup. Will consider D/C this afternoon.         Review of Systems   Constitutional:  Negative for chills, diaphoresis, fever and malaise/fatigue.   HENT:  Negative for sore throat.    Eyes:  Negative for double vision.   Respiratory:  Negative for cough, shortness of breath and wheezing.    Cardiovascular:  Negative for chest pain, palpitations, orthopnea, leg swelling and PND.   Gastrointestinal:  Positive for abdominal pain, heartburn and nausea. Negative for blood in stool, constipation and vomiting.   Genitourinary:  Negative for hematuria.   Musculoskeletal:  Negative for falls and myalgias.   Neurological:  Negative for dizziness, loss of consciousness, weakness and headaches.   Psychiatric/Behavioral:  The patient is not nervous/anxious.        Objective:     Vital Signs (Most Recent):  Temp: 98.1 °F (36.7 °C) (12/29/23 0517)  Pulse: 100 (12/29/23 0517)  Resp: (!) 22 (12/29/23 0517)  BP: (!) 156/85 (12/29/23 0517)  SpO2: 98 % (12/29/23 0517) Vital Signs (24h Range):  Temp:  [97.9 °F (36.6 °C)-98.4 °F (36.9 °C)] 98.1 °F (36.7 °C)  Pulse:  [] 100  Resp:  [16-24] 22  SpO2:  [85 %-98 %] 98 %  BP: (119-161)/(69-96) 156/85     Weight: 54 kg (119 lb 0.8 oz)  Body mass index is 21.09 kg/m².    Intake/Output Summary (Last 24 hours) at 12/29/2023 0731  Last data filed at 12/28/2023 1920  Gross per 24 hour   Intake --   Output 600 ml   Net -600 ml         Physical Exam  Vitals and nursing note reviewed. Exam conducted with a chaperone present.   Constitutional:       General: She is not in acute distress.     Appearance: She is not ill-appearing, toxic-appearing or diaphoretic.   HENT:      Head: Normocephalic and atraumatic.      Right Ear: External ear normal.      Left Ear: External ear normal.       Mouth/Throat:      Mouth: Mucous membranes are moist.      Pharynx: No oropharyngeal exudate.   Eyes:      Extraocular Movements: Extraocular movements intact.      Pupils: Pupils are equal, round, and reactive to light.   Neck:      Comments: JVD with HJR  Cardiovascular:      Rate and Rhythm: Normal rate and regular rhythm.      Pulses: Normal pulses.      Heart sounds: Normal heart sounds. No murmur heard.  Pulmonary:      Effort: Pulmonary effort is normal. No respiratory distress.      Breath sounds: No wheezing or rales.      Comments: NC in place  Decreased breath sounds RLL  Chest:      Chest wall: No tenderness.   Abdominal:      General: Abdomen is flat. Bowel sounds are normal. There is no distension.      Palpations: Abdomen is soft. There is no mass.      Tenderness: There is no abdominal tenderness.   Musculoskeletal:         General: No swelling or tenderness. Normal range of motion.      Cervical back: Normal range of motion and neck supple.      Right lower leg: No edema.      Left lower leg: No edema.      Comments: Trace edema in BLE   Skin:     General: Skin is warm and dry.      Capillary Refill: Capillary refill takes less than 2 seconds.   Neurological:      General: No focal deficit present.      Mental Status: She is alert and oriented to person, place, and time. Mental status is at baseline.   Psychiatric:         Mood and Affect: Mood normal.         Behavior: Behavior normal.             Significant Labs: All pertinent labs within the past 24 hours have been reviewed.    Significant Imaging: I have reviewed all pertinent imaging results/findings within the past 24 hours.

## 2023-12-29 NOTE — PT/OT/SLP PROGRESS
Physical Therapy Treatment    Patient Name:  Jessica Floyd   MRN:  [id number]    Recommendations:     Discharge Recommendations: Low Intensity Therapy  Discharge Equipment Recommendations: none  Barriers to discharge: None    Assessment:     Jessica Floyd is a 73 y.o. female admitted with a medical diagnosis of Acute hypoxic respiratory failure.  She presents with the following impairments/functional limitations: weakness, impaired endurance, impaired self care skills, impaired cardiopulmonary response to activity Pt tolerated treatment session well today.      Rehab Prognosis: Good; patient would benefit from acute skilled PT services to address these deficits and reach maximum level of function.    Recent Surgery: * No surgery found *      Plan:     During this hospitalization, patient to be seen 3 x/week to address the identified rehab impairments via gait training, therapeutic activities, therapeutic exercises, neuromuscular re-education and progress toward the following goals:    Plan of Care Expires:  01/26/24    Subjective     Chief Complaint: None stated   Patient/Family Comments/goals: Pt agreeable to PT   Pain/Comfort:  Pain Rating 1: 0/10      Objective:     Communicated with Rn prior to session.  Patient found sitting edge of bed with telemetry, pulse ox (continuous), oxygen upon PT entry to room.     General Precautions: Standard, aspiration, fall, seizure  Orthopedic Precautions: N/A  Braces: N/A  Respiratory Status: Nasal cannula, flow 2 L/min, Increased to 3 L/min during ambulation and stair training.       Functional Mobility:  Transfers:     Sit to Stand:  contact guard assistance with rolling walker  Gait: 150 ft CGA with RW   Pt ascended and descended 6 steps CGA with use of B handrails      AM-PAC 6 CLICK MOBILITY  Turning over in bed (including adjusting bedclothes, sheets and blankets)?: 3  Sitting down on and standing up from a chair with arms (e.g., wheelchair, bedside commode, etc.): 3  Moving  from lying on back to sitting on the side of the bed?: 3  Moving to and from a bed to a chair (including a wheelchair)?: 3  Need to walk in hospital room?: 3  Climbing 3-5 steps with a railing?: 3  Basic Mobility Total Score: 18       Treatment & Education:  Therapist provided instruction and educated for safety during transfers and gait training. As well as proper body mechanics, energy conservation, and fall prevention strategies during tasks listed above, and the effects of prolonged immobility and the importance of performing EOB/OOB activity and exercises to promote healing and reduce recovery time.       Patient left up in chair with all lines intact, call button in reach, and RN notified..    GOALS:   Multidisciplinary Problems       Physical Therapy Goals          Problem: Physical Therapy    Goal Priority Disciplines Outcome Goal Variances Interventions   Physical Therapy Goal     PT, PT/OT Ongoing, Progressing     Description: Goals to be met by: 24     Patient will increase functional independence with mobility by performin. Supine to sit with [place]  2. Sit to stand transfer with [place]  3. Bed to chair transfer with [place] using LRAD  4. Gait  x 200 feet with Supervision using LRAD.   5. Ascend/descend 6 stair with bilateral Handrails Supervision using LRAD.   6. Lower extremity exercise program x30 reps per handout, with independence                         Time Tracking:     PT Received On: 23  PT Start Time: 859     PT Stop Time: 933  PT Total Time (min): 34 min     Billable Minutes: Gait Training 17 and Therapeutic Activity 17    Treatment Type: Treatment  PT/PTA: PTA     Number of PTA visits since last PT visit: 2     2023

## 2023-12-29 NOTE — PROGRESS NOTES
"[facility] ([address] Medicine  Progress Note    Patient Name: Jessica Floyd  MRN: [id number]  Patient Class: IP- Inpatient   Admission Date: 12/25/2023  Length of Stay: 3 days  Attending Physician: Enrique Salinas MD  Primary Care Provider: Effie Olmedo MD        Subjective:     Principal Problem:Acute hypoxic respiratory failure        HPI:  73-year-old female with a past medical history of former smoking, CAD, R Carotid stent, dyslipidemia, HTN, gout, anxiety, prev SAH (1999) COPD on home O2 (1L PRN), mild pulmonary hypertension, stress induced reflex syncope, history of ESBL UTI,  now presenting with chief complaint of shortness of breath.  Patient reports that yesterday during the day she experienced worsening shortness of breath and cough.  She has had SOB for weeks, possibly months, and a lingering cough since her COVID infection last month but yesterday had an acute change. Xmas eric she was not feeling well, and around 6 or 7 pm she called ambulence. After some SOB at home and 3L didn't help. Was observed by  to be satting 81 or 82.  Baseline is 94 or 95.     Patient denies any hemoptysis, fevers, or sick contacts. Admits to SAMPSON, productive gray cough, chest tightness with exertion improved with rest, leg swelling, poor exercise tolerance, and balance issues. Reports weight loss of 20-30 lbs since 6 months. Reports decreased PO intake d/t early satiety. Former Smoker, Drinks 2-3 glasses of wine daily. Denies drugs. Family history of emphysema. Vaxed for flu rsv, pna, and covid. Follows with pulm OP. Recent hospitalization 1 month ago for Covid.   claims pt "does not walk at all" and gets SOB after walking to the restroom. Uses asistance from others or cane / walker to ambulate. Claims to have balance issues. Hospitalized June 2023 for a broken hip, claims a table fell on her. She denies falls.     EMS reports that patient had saturation of 80% on room air " requiring 3 L nasal cannula.  Albuterol neb was given by EMS in addition to 125 mg of methylprednisone. In the Ed at presentation her vitals were: /100 P116 RR22 98.5F O2% 93, Initally requiring Bipap, now satting 97 with 8L NC s/p breathing treatment. WBC 31.6 H &H 10.7 33.1  Na 133 K 2.9 CO2 20 Albumin 2.6  Trop WNL Lactate WNL Flu A+ PCO2 33 Po 26 PHCO3 21.7 EKG? Sinus Tach around 115, T wave inversions V3-V5, II, III, AVF, Xray: Patchy right basilar consolidation may relate to infection/pneumonia or aspiration.  Clinical correlation and continued imaging follow-up is recommended. Procal +.     Receive Rocephin, Azithro, Mag, and Steroids in ED. Admitted to [facility] for management of Acute Hypoxic Respiratory Failure.     Overview/Hospital Course:  Admitted to [facility] for acute hypoxic respiratory failure. Initally requiring 8L, currently weening as tolerated. On 1L prn at home. Patient has been started on Abx for PNA coverage (Est end date 12/31) initally Iv transitioned to PO, Tamiflu for Influenza A positivity (end date 12/29), prednisone (estimated end date 12/29) for COPD exacerbation, and is being diuresis for hypervolemia on PE w/ hx of HFpEF with PRN Lasix. Patient received supportive care via mucinex, Q4H breathing treatments, IS, and chest percussions. Patient back to baseline home oxygen evening of 12/28-12/29 and medically stable for D/C. Will send with remaining Abx & anti viral and HH. Some intermittent complaints of burning chest pain/dysphagia.. Cardiac workup negative and patient responded well to reglan and mylanta prns. Some sinus arrhyhtmia, PAC and PVCs  noted on tele; patient denies any acute sensations of palpitations. Will consider d/c with event monitor. Will place outpatient referrals to cards for HFpEF and possible arrhyhtmia. Will place Pulm Consult for outpatient follow-up and medication optimization. Will place GI referral for outpatient management of dysphagia/gerd.  Patient reports satisfaction with care received and feels comfortable with discharge home.     Interval History: No Acute Overnight Events. Patient is afebrile and hemodynamically stable. AM labs pending. Weened to RA overnight. Using 1-2 L PRN. C/o of dysphagia/reflux, satisfied with outpatient GI workup. Will consider D/C this afternoon.         Review of Systems   Constitutional:  Negative for chills, diaphoresis, fever and malaise/fatigue.   HENT:  Negative for sore throat.    Eyes:  Negative for double vision.   Respiratory:  Negative for cough, shortness of breath and wheezing.    Cardiovascular:  Negative for chest pain, palpitations, orthopnea, leg swelling and PND.   Gastrointestinal:  Positive for abdominal pain, heartburn and nausea. Negative for blood in stool, constipation and vomiting.   Genitourinary:  Negative for hematuria.   Musculoskeletal:  Negative for falls and myalgias.   Neurological:  Negative for dizziness, loss of consciousness, weakness and headaches.   Psychiatric/Behavioral:  The patient is not nervous/anxious.        Objective:     Vital Signs (Most Recent):  Temp: 98.1 °F (36.7 °C) (12/29/23 0517)  Pulse: 100 (12/29/23 0517)  Resp: (!) 22 (12/29/23 0517)  BP: (!) 156/85 (12/29/23 0517)  SpO2: 98 % (12/29/23 0517) Vital Signs (24h Range):  Temp:  [97.9 °F (36.6 °C)-98.4 °F (36.9 °C)] 98.1 °F (36.7 °C)  Pulse:  [] 100  Resp:  [16-24] 22  SpO2:  [85 %-98 %] 98 %  BP: (119-161)/(69-96) 156/85     Weight: 54 kg (119 lb 0.8 oz)  Body mass index is 21.09 kg/m².    Intake/Output Summary (Last 24 hours) at 12/29/2023 0731  Last data filed at 12/28/2023 1920  Gross per 24 hour   Intake --   Output 600 ml   Net -600 ml         Physical Exam  Vitals and nursing note reviewed. Exam conducted with a chaperone present.   Constitutional:       General: She is not in acute distress.     Appearance: She is not ill-appearing, toxic-appearing or diaphoretic.   HENT:      Head: Normocephalic and  atraumatic.      Right Ear: External ear normal.      Left Ear: External ear normal.      Mouth/Throat:      Mouth: Mucous membranes are moist.      Pharynx: No oropharyngeal exudate.   Eyes:      Extraocular Movements: Extraocular movements intact.      Pupils: Pupils are equal, round, and reactive to light.   Neck:      Comments: JVD with HJR  Cardiovascular:      Rate and Rhythm: Normal rate and regular rhythm.      Pulses: Normal pulses.      Heart sounds: Normal heart sounds. No murmur heard.  Pulmonary:      Effort: Pulmonary effort is normal. No respiratory distress.      Breath sounds: No wheezing or rales.      Comments: NC in place  Decreased breath sounds RLL  Chest:      Chest wall: No tenderness.   Abdominal:      General: Abdomen is flat. Bowel sounds are normal. There is no distension.      Palpations: Abdomen is soft. There is no mass.      Tenderness: There is no abdominal tenderness.   Musculoskeletal:         General: No swelling or tenderness. Normal range of motion.      Cervical back: Normal range of motion and neck supple.      Right lower leg: No edema.      Left lower leg: No edema.      Comments: Trace edema in BLE   Skin:     General: Skin is warm and dry.      Capillary Refill: Capillary refill takes less than 2 seconds.   Neurological:      General: No focal deficit present.      Mental Status: She is alert and oriented to person, place, and time. Mental status is at baseline.   Psychiatric:         Mood and Affect: Mood normal.         Behavior: Behavior normal.             Significant Labs: All pertinent labs within the past 24 hours have been reviewed.    Significant Imaging: I have reviewed all pertinent imaging results/findings within the past 24 hours.    Assessment/Plan:      * Acute hypoxic respiratory failure  Patient with Hypoxic Respiratory failure which is Acute on chronic.  she is on home oxygen at 1L PRN LPM. Supplemental oxygen was provided and noted-   Signs/symptoms of  "respiratory failure include- tachypnea, increased work of breathing, respiratory distress, and wheezing. Contributing diagnoses includes - Aspiration, CHF, COPD, and Pneumonia Labs and images were reviewed. Patient Has not had a recent ABG. Will treat underlying causes and adjust management of respiratory failure as follows    73-year-old female with a past medical history of former smoking, CAD, R Carotid stent, dyslipidemia, HTN, gout, anxiety, prev SAH (1999) COPD on home O2 (1L PRN), mild pulmonary hypertension, stress induced reflex syncope, history of ESBL UTI,  now presenting with chief complaint of shortness of breath.  Patient reports that yesterday during the day she experienced worsening shortness of breath and cough.  She has had SOB for weeks, possibly months, and a lingering cough since her COVID infection last month but yesterday had an acute change. Xmas eric she was not feeling well, and around 6 or 7 pm she called ambulence. After some SOB at home and 3L didn't help. Was observed by  to be satting 81 or 82.  Baseline is 94 or 95.  Patient denies any hemoptysis, fevers, or sick contacts. Admits to SAMPSON, productive gray cough, chest tightness with exertion improved with rest, leg swelling, poor exercise tolerance, and balance issues. Reports weight loss of 20-30 lbs since 6 months. Vaxed for flu rsv, pna, and covid. Follows with pulm OP. Recent hospitalization 1 month ago for Covid.   claims pt "does not walk at all" and gets SOB after walking to the restroom. Uses asistance from others or cane / walker to ambulate.     EMS reports that patient had saturation of 80% on room air requiring 3 L nasal cannula.  Albuterol neb was given by EMS in addition to 125 mg of methylprednisone. In the Ed at presentation her vitals were: /100 P116 RR22 98.5F O2% 93, Initally requiring Bipap, now satting 97 with 8L NC s/p breathing treatment. WBC 31.6 H &H 10.7 33.1  Na 133 K 2.9 CO2 20 " Albumin 2.6  Trop WNL Lactate WNL Flu A+ PCO2 33 Po 26 PHCO3 21.7 EKG? Sinus Tach around 115, T wave inversions V3-V5, II, III, AVF, Xray: Patchy right basilar consolidation may relate to infection/pneumonia or aspiration.  Clinical correlation and continued imaging follow-up is recommended. Procal +.     Receive Rocephin, Azithro, Mag, and Steroids in ED. Admitted to [facility] for management of Acute Hypoxic Respiratory Failure.       Plan  Q4Hr Breathing treatments  Q4H chest physiotherapy  Q4H IS  Q4H Guaifenesin   CAP coverage with Ceftriaxone and Azithro  Tamiflu for influenza x5 days  Spot dosing Lasix for volume overload 2/2 HFpEF  Continue home inhalers  Prednisone 40 mg qd  Ween O2 as possible. Back to baseline O2  Pulm/Cards referral placed        Influenza A  5 days of tamiflu. See AHRF      Moderate malnutrition  Nutrition consulted. Most recent weight and BMI monitored-     Measurements:  Wt Readings from Last 1 Encounters:   12/26/23 54 kg (119 lb 0.8 oz)   Body mass index is 21.09 kg/m².    Patient has been screened and assessed by RD.    Malnutrition Type:  Context: acute illness or injury  Level: moderate    Malnutrition Characteristic Summary:  Weight Loss (Malnutrition): other (see comments) (15% x 8-9 months)  Energy Intake (Malnutrition): less than 75% for greater than or equal to 1 month  Subcutaneous Fat (Malnutrition): moderate depletion  Muscle Mass (Malnutrition): moderate depletion    Interventions/Recommendations (treatment strategy):  1.      Weight loss  20-30lb weight loss, will ask nutrition to see pt. Lung Ct neg, will consider other cancer screening. Pt complains of early saiety, no GI symptoms. Dcreased appetite.     Sepsis  This patient does have evidence of infective focus  My overall impression is sepsis.  Source: Respiratory  Antibiotics given-   Antibiotics (72h ago, onward)      Start     Stop Route Frequency Ordered    12/27/23 0400  bacitracin ointment         -- Top 3  times daily 12/27/23 0345    12/26/23 0600  cefTRIAXone (ROCEPHIN) 1 g in dextrose 5 % in water (D5W) 100 mL IVPB (MB+)         -- IV Every 24 hours (non-standard times) 12/25/23 0910          Latest lactate reviewed-  Recent Labs   Lab 12/25/23  0655   LACTATE 0.9       Organ dysfunction indicated by Acute respiratory failure    Fluid challenge Not needed - patient is not hypotensive      Post- resuscitation assessment No - Post resuscitation assessment not needed       Will Not start Pressors- Levophed for MAP of 65  Source control achieved by: Abx    Ceftriaxone and Azithro and Tamiflu. Ua and blood ctx pending      Pulmonary emphysema  Patient's COPD is with exacerbation noted by continued dyspnea and worsening of baseline hypoxia currently.  Patient is currently off COPD Pathway. Continue scheduled inhalers Steroids, Antibiotics, and Supplemental oxygen and monitor respiratory status closely.     Aortic atherosclerosis        Gout  Continue home meds      Osteoporosis        Hypertension  Chronic, controlled. Latest blood pressure and vitals reviewed-     Temp:  [97.7 °F (36.5 °C)-98.9 °F (37.2 °C)]   Pulse:  []   Resp:  [16-37]   BP: (140-156)/(74-83)   SpO2:  [91 %-98 %] .   Home meds for hypertension were reviewed and noted below.   Hypertension Medications               amLODIPine (NORVASC) 5 MG tablet TAKE 1 TABLET BY MOUTH EVERY DAY    carvediloL (COREG) 6.25 MG tablet Take 1 tablet (6.25 mg total) by mouth 2 (two) times daily with meals.    hydroCHLOROthiazide (HYDRODIURIL) 25 MG tablet Take 1 tablet (25 mg total) by mouth once daily.            While in the hospital, will manage blood pressure as follows; Adjust home antihypertensive regimen as follows- Continue Coreg, hold amlodipine and monitor bp in setting of sepsis with good BPs    Will utilize p.r.n. blood pressure medication only if patient's blood pressure greater than 180/110 and she develops symptoms such as worsening chest pain or  shortness of breath.    Gastroesophageal reflux disease without esophagitis  Continue home meds        Dyslipidemia  Continue home meds        Coronary artery disease involving native coronary artery of native heart without angina pectoris  Patient with known CAD. Will continue Statin, ASA at home for primary prevention will cosnider holding. SAMPSON and some chest discomfort with exertion, will conisder stress test      VTE Risk Mitigation (From admission, onward)           Ordered     enoxaparin injection 40 mg  Every 24 hours         12/26/23 1651     IP VTE HIGH RISK PATIENT  Once         12/25/23 0634     Place sequential compression device  Until discontinued         12/25/23 0634                    Discharge Planning   DEBBIE: 12/29/2023     Code Status: Full Code   Is the patient medically ready for discharge?: Yes    Reason for patient still in hospital (select all that apply): Laboratory test and Pending disposition  Discharge Plan A: Home Health                  Alejo Bonilla DO  Department of Hospital Medicine   [facility] ([place]

## 2023-12-29 NOTE — PLAN OF CARE
Alejo Bonilla DO   [facility] Medicine     Plan of Care     Addendum     Creation Time: 12/29/2023  7:40 AM     Expand All Collapse All    [address] - Intensive Care ([place] [id number])        HOME HEALTH ORDERS  FACE TO FACE ENCOUNTER     Patient Name: Jessica Floyd  YOB: 1950     PCP: Effie Olmedo MD   PCP Address: [address] [place] [address]  PCP Phone Number: [phone]  PCP Fax: [phone]     Encounter Date: 12/25/23     Admit to Home Health     Diagnoses:         Active Hospital Problems     Diagnosis   POA    *Acute hypoxic respiratory failure [J96.01]   Yes    Influenza A [J10.1]   Yes    Moderate malnutrition [E44.0]   Yes    Sepsis [A41.9]   Yes    Weight loss [R63.4]   Yes    Pulmonary emphysema [J43.9]   Yes       Chronic    Aortic atherosclerosis [I70.0]   Yes    Gout [M10.9]   Yes       Chronic    Osteoporosis [M81.0]   Yes    Hypertension [I10]   Yes       Chronic    Gastroesophageal reflux disease without esophagitis [K21.9]   Yes       Chronic    Coronary artery disease involving native coronary artery of native heart without angina pectoris [I25.10]   Yes       Chronic       Outside [facility] 2014:  mLAD 20%  mCx 50%  mRCA 20%       Dyslipidemia [E78.5]   Yes       Chronic       Resolved Hospital Problems   No resolved problems to display.         Follow Up Appointments:         Future Appointments   Date Time Provider Department Center   1/3/2024  9:00 AM Fernanda Manzanares MD [facility] PAINMGT [facility]   2/26/2024 10:30 AM Armani Cai PA-C [facility] SPMEDPC [facility]\""p   3/5/2024  9:00 AM Michael Lal MD [facility] [id number] [facility]   6/17/2024  9:00 AM [facility] DEXA1 [facility] BMD [facility]         Allergies:Review of patient's allergies indicates:  No Known Allergies     Medications: Review discharge medications with patient and family and provide education.     Current Medications             Current Facility-Administered Medications   Medication Dose Route  Frequency Provider Last Rate Last Admin    acetaminophen tablet 650 mg  650 mg Oral Q4H PRN Alejo Bonilla DO   650 mg at 12/25/23 0931    albuterol-ipratropium 2.5 mg-0.5 mg/3 mL nebulizer solution 3 mL  3 mL Nebulization Q4H Earline Hernández MD   3 mL at 12/29/23 0810    allopurinoL tablet 200 mg  200 mg Oral Daily Alejo Bonilla DO   200 mg at 12/28/23 0941    aluminum & magnesium hydroxide-simethicone 400-400-40 mg/5 mL suspension 30 mL  30 mL Oral Q6H PRN Alejo Bonilla DO   30 mL at 12/28/23 2144    amoxicillin-clavulanate 875-125mg per tablet 1 tablet  1 tablet Oral Q12H Alejo Bonilla DO        aspirin EC tablet 81 mg  81 mg Oral Daily Alejo Bonilla DO   81 mg at 12/28/23 0941    atorvastatin tablet 80 mg  80 mg Oral Daily Alejo Bonilla DO   80 mg at 12/28/23 0941    bacitracin ointment   Topical (Top) TID Chavez Kidd MD   Given at 12/28/23 0941    calcium carbonate 200 mg calcium (500 mg) chewable tablet 1,000 mg  1,000 mg Oral BID PRN Rick Ventura DO   1,000 mg at 12/25/23 1348    carvediloL tablet 6.25 mg  6.25 mg Oral BID WM Alejo Bonilla DO   6.25 mg at 12/28/23 1817    dextrose 10% bolus 125 mL 125 mL  12.5 g Intravenous PRN Alejo Bonilla, DO        dextrose 10% bolus 250 mL 250 mL  25 g Intravenous PRN Alejo Bonilla, DO        enoxaparin injection 40 mg  40 mg Subcutaneous Q24H (prophylaxis, 1700) Rick Ventura,    40 mg at 12/28/23 1817    fluticasone furoate-vilanteroL 100-25 mcg/dose diskus inhaler 1 puff  1 puff Inhalation Daily Alejo Bonilla DO   1 puff at 12/29/23 0812    folic acid tablet 1 mg  1 mg Oral Daily Alejo Bonilla DO   1 mg at 12/28/23 0941    furosemide tablet 20 mg  20 mg Oral Daily Alejo Bonilla DO        glucagon (human recombinant) injection 1 mg  1 mg Intramuscular PRN Alejo Bonilla DO        glucose chewable tablet 16 g  16 g Oral PRN Alejo Bonilla DO        glucose chewable tablet 24 g  24 g Oral PRN Alejo Bonilla DO         guaiFENesin 100 mg/5 ml syrup 200 mg  200 mg Oral Q4H Alejo Bonilla, DO   200 mg at 12/29/23 0944    melatonin tablet 6 mg  6 mg Oral Nightly PRN Andrea Bonillail, DO   6 mg at 12/25/23 2212    melatonin tablet 6 mg  6 mg Oral Nightly PRN Andrea Bonillail, DO        metoclopramide HCl tablet 10 mg  10 mg Oral TID AC PRN Andrea Bonillail, DO        montelukast tablet 10 mg  10 mg Oral QHS Andrea Bonillail, DO   10 mg at 12/25/23 2212    naloxone 0.4 mg/mL injection 0.02 mg  0.02 mg Intravenous PRN Alejo Bonilla, DO        oseltamivir capsule 30 mg  30 mg Oral BID Earline Hernández MD   30 mg at 12/28/23 0941    pantoprazole EC tablet 40 mg  40 mg Oral Daily Alejo Bonilla, DO   40 mg at 12/28/23 0941    predniSONE tablet 40 mg  40 mg Oral Daily Alejo Bonilla, DO   40 mg at 12/28/23 0941    QUEtiapine tablet 25 mg  25 mg Oral Once Chavez Kidd MD        QUEtiapine tablet 50 mg  50 mg Oral QHS Alejo Bonilla, DO   50 mg at 12/28/23 2033    simethicone chewable tablet 80 mg  1 tablet Oral QID PRN Alejo Bonilla, DO        sodium chloride 0.9% flush 10 mL  10 mL Intravenous Q12H PRN Alejo Bonilla, DO        thiamine tablet 100 mg  100 mg Oral Daily Alejo Bonilla, DO   100 mg at 12/28/23 0941    tiotropium bromide 2.5 mcg/actuation inhaler 2 puff  2 puff Inhalation Daily Alejo Bonilla, DO   2 puff at 12/29/23 0813                Facility-Administered Medications Ordered in Other Encounters   Medication Dose Route Frequency Provider Last Rate Last Admin    mupirocin 2 % ointment   Nasal On Call Procedure Kang Diaz MD   Given at 10/25/23 1045    tranexamic acid (CYKLOKAPRON) 1,000 mg in sodium chloride 0.9 % 100 mL IVPB (MB+)  1,000 mg Intravenous Once Kang Diaz MD        tranexamic acid (CYKLOKAPRON) 1,000 mg in sodium chloride 0.9 % 100 mL IVPB (MB+)  1,000 mg Intravenous Once Kang Diaz MD             Current Discharge Medication List              START taking these medications      Details   albuterol-ipratropium (DUO-NEB) 2.5 mg-0.5 mg/3 mL nebulizer solution Take 3 mLs by nebulization every 6 (six) hours as needed for Wheezing. Rescue  Qty: 90 mL, Refills: 0       aluminum & magnesium hydroxide-simethicone (MYLANTA MAX STRENGTH) 400-400-40 mg/5 mL suspension Take 30 mLs by mouth every 6 (six) hours as needed.  Qty: 300 mL, Refills: 0       amoxicillin-clavulanate 875-125mg (AUGMENTIN) 875-125 mg per tablet Take 1 tablet by mouth 2 (two) times daily. for 5 doses  Qty: 5 tablet, Refills: 0       bacitracin 500 unit/gram ointment Apply topically 3 (three) times daily.  Refills: 0       calcium carbonate (TUMS) 200 mg calcium (500 mg) chewable tablet Take 2 tablets (1,000 mg total) by mouth 2 (two) times daily as needed for Heartburn.       furosemide (LASIX) 20 MG tablet Take 1 tablet (20 mg total) by mouth daily as needed (Leg swelling, SOB, Weight gain 3lb in 1 day or 5 lbs in 2 days.).  Qty: 30 tablet, Refills: 11       guaiFENesin (MUCINEX) 600 mg 12 hr tablet Take 1 tablet (600 mg total) by mouth 2 (two) times daily. for 14 days  Qty: 28 tablet, Refills: 0       metoclopramide HCl (REGLAN) 10 MG tablet Take 1 tablet (10 mg total) by mouth before meals as needed (Abdominal Pain).  Qty: 20 tablet, Refills: 0       oseltamivir (TAMIFLU) 30 MG capsule Take 1 capsule (30 mg total) by mouth 2 (two) times daily. for 1 dose  Qty: 1 capsule, Refills: 0       predniSONE (DELTASONE) 10 MG tablet Take 2 tablets by mouth once daily for 2 days, THEN 1 tablet once daily for 2 days, THEN 0.5 tablets once daily for 2 days. Please take your lasix everyday while you take your prednisone.  Qty: 7 tablet, Refills: 0                   CONTINUE these medications which have NOT CHANGED     Details   acetaminophen (TYLENOL) 650 MG TbSR Take 1 tablet (650 mg total) by mouth every 6 to 8 hours as needed (pain (mild-moderate)).  Qty: 120 tablet, Refills: 0     Comments: PO delivery  Associated Diagnoses:  Post-operative state       allopurinoL (ZYLOPRIM) 100 MG tablet Take 2 tablets (200 mg total) by mouth once daily.  Qty: 180 tablet, Refills: 3     Associated Diagnoses: Chronic gout without tophus, unspecified cause, unspecified site       amLODIPine (NORVASC) 5 MG tablet TAKE 1 TABLET BY MOUTH EVERY DAY  Qty: 90 tablet, Refills: 1     Comments: DX Code Needed  . - .  Associated Diagnoses: Essential hypertension       aspirin (ECOTRIN) 81 MG EC tablet Take 81 mg by mouth once daily.       atorvastatin (LIPITOR) 80 MG tablet TAKE 1 TABLET BY MOUTH EVERY DAY  Qty: 90 tablet, Refills: 3     Associated Diagnoses: Dyslipidemia       carvediloL (COREG) 6.25 MG tablet Take 1 tablet (6.25 mg total) by mouth 2 (two) times daily with meals.  Qty: 60 tablet, Refills: 11     Comments: .       colchicine, gout, (COLCRYS) 0.6 mg tablet Take 1 tablet (0.6 mg total) by mouth once daily. As needed for gout  Qty: 30 tablet, Refills: 11     Associated Diagnoses: Gout, unspecified cause, unspecified chronicity, unspecified site       folic acid (FOLVITE) 1 MG tablet Take 1 tablet (1 mg total) by mouth once daily.  Qty: 90 tablet, Refills: 3       pantoprazole (PROTONIX) 40 MG tablet Take 1 tablet (40 mg total) by mouth once daily.  Qty: 90 tablet, Refills: 0     Associated Diagnoses: Gastroesophageal reflux disease without esophagitis       celecoxib (CELEBREX) 200 MG capsule Take 1 capsule (200 mg total) by mouth as needed.     Comments: PO delivery  Associated Diagnoses: Post-operative state       COMIRNATY 2023-24, 12Y UP,,PF, 30 mcg/0.3 mL inection         FLUAD QUAD 2023-24,65Y UP,,PF, 60 mcg (15 mcg x 4)/0.5 mL Syrg         fluticasone furoate-vilanteroL (BREO) 100-25 mcg/dose diskus inhaler Inhale 1 puff into the lungs once daily. Controller  Qty: 30 each, Refills: 11     Associated Diagnoses: SOB (shortness of breath) on exertion       fluticasone propionate (FLONASE) 50 mcg/actuation nasal spray SPRAY 2 pumps INTO EACH  NOSTRIL ONCE DAILY  Qty: 16 mL, Refills: 3     Associated Diagnoses: Viral upper respiratory tract infection       hydroCHLOROthiazide (HYDRODIURIL) 25 MG tablet Take 1 tablet (25 mg total) by mouth once daily.  Qty: 90 tablet, Refills: 3     Associated Diagnoses: Primary hypertension       melatonin (MELATIN) 3 mg tablet Take 2 tablets (6 mg total) by mouth nightly as needed for Insomnia.  Refills: 0       montelukast (SINGULAIR) 10 mg tablet Take 1 tablet (10 mg total) by mouth every evening.  Qty: 90 tablet, Refills: 11       multivitamin (THERAGRAN) tablet Take 1 tablet by mouth once daily.  Qty: 90 tablet, Refills: 3       ORTHOVISC 30 mg/2 mL         prenatal no122/iron/folic acid (PRENATAL MULTI ORAL) Take 1 tablet by mouth once daily.       QUEtiapine (SEROQUEL) 50 MG tablet Take 1 tablet (50 mg total) by mouth every evening.  Qty: 30 tablet, Refills: 0       tiotropium bromide (SPIRIVA RESPIMAT) 2.5 mcg/actuation inhaler Inhale 2 puffs into the lungs once daily. Controller  Qty: 4 g, Refills: 2     Associated Diagnoses: SOB (shortness of breath) on exertion       VENOFER 100 mg iron/5 mL injection                      I have seen and examined this patient within the last 30 days. My clinical findings that support the need for the home health skilled services and home bound status are the following:no              Weakness/numbness causing balance and gait disturbance due to Heart Failure, COPD Exacerbation, and Weakness/Debility making it taxing to leave home.      Diet:   cardiac diet and fluid restriction     Labs:  SN to perform labs:  CBC: Weekly; 8 week(s) and BMP: Weekly; 8 week(s)     Referrals/ Consults  Physical Therapy to evaluate and treat. Evaluate for home safety and equipment needs; Establish/upgrade home exercise program. Perform / instruct on therapeutic exercises, gait training, transfer training, and Range of Motion.  Occupational Therapy to evaluate and treat. Evaluate home environment  for safety and equipment needs. Perform/Instruct on transfers, ADL training, ROM, and therapeutic exercises.  Aide to provide assistance with personal care, ADLs, and vital signs.     Activities:   activity as tolerated     Nursing:   Agency to admit patient within 24 hours of hospital discharge unless specified on physician order or at patient request     SN to complete comprehensive assessment including routine vital signs. Instruct on disease process and s/s of complications to report to MD. Review/verify medication list sent home with the patient at time of discharge  and instruct patient/caregiver as needed. Frequency may be adjusted depending on start of care date.      Skilled nurse to perform up to 3 visits PRN for symptoms related to diagnosis     Notify MD if SBP > 160 or < 90; DBP > 90 or < 50; HR > 120 or < 50; Temp > 101; O2 < 88%; Other:   Weight gain/fluid overload     Ok to schedule additional visits based on staff availability and patient request on consecutive days within the home health episode.     When multiple disciplines ordered:     Start of Care occurs on Sunday - Wednesday schedule remaining discipline evaluations as ordered on separate consecutive days following the start of care.     Thursday SOC -schedule subsequent evaluations Friday and Monday the following week.      Friday - Saturday SOC - schedule subsequent discipline evaluations on consecutive days starting Monday of the following week.     For all post-discharge communication and subsequent orders please contact patient's primary care physician. If unable to reach primary care physician or do not receive response within 30 minutes, please contact PCP for clinical staff order clarification     Miscellaneous   Routine Skin for Bedridden Patients: Instruct patient/caregiver to apply moisture barrier cream to all skin folds and wet areas in perineal area daily and after baths and all bowel movements.  Heart Failure:       SN to  instruct on the following:               Instruct on the definition of CHF.              Instruct on the signs/sympoms of CHF to be reported.              Instruct on and monitor daily weights.              Instruct on factors that cause exacerbation.              Instruct on action, dose, schedule, and side effects of medications.              Instruct on diet as prescribed.              Instruct on activity allowed.              Instruct on life-style modifications for life long management of CHF              SN to assess compliance with daily weights, diet, medications, fluid retention,                          safety precautions, activities permitted and life-style modifications.              Additional 1-2 SN visits per week as needed for signs and symptoms                           of CHF exacerbation.        For Weight Gain > 2-3 lbs in 1 day or 4-6 lbs over 1 week notify PCP:  CHF DIURETIC SLIDING SCALE     Skilled nurse visits daily to instruct and monitor medication adherence until target weight. Then resume prior order frequency.     If weight gain exceeds 5 lbs over target weight, call MD  If weight gain of 3-4 lbs over target weight, then:     Increase Furosemide - current dose (mg/day) to   double dose and frequency of daily until target weight achieved or maximum 3 days.     If already on max oral daily dose, see IV diuretic instructions.     After 3 days of increased oral diuretic dose, get BMP. If patient is on increased oral diuretic dose greater than 5 days, repeat BMP at day 7 of increased dose.     Potassium supplementation    Scr > 1.5 mg/dl  Scr  1.5 mg/dl    K < 3.0 - NOTIFY MD and 40 mEq bid  40 mEq tid   K- 3.1-3.3  20 mEq bid  20 mEq tid    K 3.4-3.7  10 mEq bid  10 mEq tid       If target weight not reached after 5 days of increased oral diuretic, proceed to IV diuretic with daily patient contact to include face-to-face visit and telephone encounters.        Home Oxygen:  Oxygen at 1-3  L/min nasal canula to be used:  As needed for SOB., Assess oxygen saturation via pulse oximeter as needed for increase in SOB., Notify physician if oxygen saturation less than 88%, Consult respiratory therapy for instruction on home oxygen use, and Breathing treatments via nebulizer if needed.     Home Health Aide:  Nursing Weekly, Physical Therapy Three times weekly, Occupational Therapy Three times weekly, Respiratory Therapy Three times weekly, and Home Health Aide Weekly    Wound Care Orders  yes:  Pressure Ulcer(s) Stage II :   Location: Coccyx/ Right buttock- altered skin integrity: 1)clean wound and surrounding area with normal saline. 2)fill R buttock wound with Aquacel Ag rope and cover coccyx wound with same dressing.  3)secure Aquacel Ag with border foam dressing. Perform care every other day.             I certify that this patient is confined to her home and needs intermittent skilled nursing care, physical therapy, and occupational therapy.                  Cosigned by: Enrique Salinas MD at 12/29/2023 10:07 AM     Revision History

## 2023-12-29 NOTE — NURSING
Patient c/o indigestion. Refused most scheduled meds except seroquel. Treated with PRNs, offered chewables, refused.   Patient  at bedside.   As of 2120 Patient walked around the unit , tolerated well with walker and O2 tank on 1L.  On return to bedside 92% will tritrate O2 down as tolerated per orders.  Linens changed by this RN.

## 2023-12-29 NOTE — ASSESSMENT & PLAN NOTE
"Patient with Hypoxic Respiratory failure which is Acute on chronic.  she is on home oxygen at 1L PRN LPM. Supplemental oxygen was provided and noted-   Signs/symptoms of respiratory failure include- tachypnea, increased work of breathing, respiratory distress, and wheezing. Contributing diagnoses includes - Aspiration, CHF, COPD, and Pneumonia Labs and images were reviewed. Patient Has not had a recent ABG. Will treat underlying causes and adjust management of respiratory failure as follows    73-year-old female with a past medical history of former smoking, CAD, R Carotid stent, dyslipidemia, HTN, gout, anxiety, prev SAH (1999) COPD on home O2 (1L PRN), mild pulmonary hypertension, stress induced reflex syncope, history of ESBL UTI,  now presenting with chief complaint of shortness of breath.  Patient reports that yesterday during the day she experienced worsening shortness of breath and cough.  She has had SOB for weeks, possibly months, and a lingering cough since her COVID infection last month but yesterday had an acute change. Xmas eric she was not feeling well, and around 6 or 7 pm she called ambulence. After some SOB at home and 3L didn't help. Was observed by  to be satting 81 or 82.  Baseline is 94 or 95.  Patient denies any hemoptysis, fevers, or sick contacts. Admits to SAMPSON, productive gray cough, chest tightness with exertion improved with rest, leg swelling, poor exercise tolerance, and balance issues. Reports weight loss of 20-30 lbs since 6 months. Vaxed for flu rsv, pna, and covid. Follows with pulm OP. Recent hospitalization 1 month ago for Covid.   claims pt "does not walk at all" and gets SOB after walking to the restroom. Uses asistance from others or cane / walker to ambulate.     EMS reports that patient had saturation of 80% on room air requiring 3 L nasal cannula.  Albuterol neb was given by EMS in addition to 125 mg of methylprednisone. In the Ed at presentation her vitals were: " /100 P116 RR22 98.5F O2% 93, Initally requiring Bipap, now satting 97 with 8L NC s/p breathing treatment. WBC 31.6 H &H 10.7 33.1  Na 133 K 2.9 CO2 20 Albumin 2.6  Trop WNL Lactate WNL Flu A+ PCO2 33 Po 26 PHCO3 21.7 EKG? Sinus Tach around 115, T wave inversions V3-V5, II, III, AVF, Xray: Patchy right basilar consolidation may relate to infection/pneumonia or aspiration.  Clinical correlation and continued imaging follow-up is recommended. Procal +.     Receive Rocephin, Azithro, Mag, and Steroids in ED. Admitted to [facility] for management of Acute Hypoxic Respiratory Failure.       Plan  Q4Hr Breathing treatments  Q4H chest physiotherapy  Q4H IS  Q4H Guaifenesin   CAP coverage with Ceftriaxone and Azithro  Tamiflu for influenza x5 days  Spot dosing Lasix for volume overload 2/2 HFpEF  Continue home inhalers  Prednisone 40 mg qd  Ween O2 as possible. Back to baseline O2  Pulm/Cards referral placed

## 2023-12-29 NOTE — PLAN OF CARE
Problem: Adult Inpatient Plan of Care  Goal: Plan of Care Review  Outcome: Ongoing, Progressing  Goal: Patient-Specific Goal (Individualized)  Outcome: Ongoing, Progressing  Goal: Absence of Hospital-Acquired Illness or Injury  Outcome: Ongoing, Progressing  Goal: Optimal Comfort and Wellbeing  Outcome: Ongoing, Progressing  Goal: Readiness for Transition of Care  Outcome: Ongoing, Progressing     Problem: Impaired Wound Healing  Goal: Optimal Wound Healing  Outcome: Ongoing, Progressing     Problem: Adjustment to Illness (Sepsis/Septic Shock)  Goal: Optimal Coping  Outcome: Ongoing, Progressing     Problem: Bleeding (Sepsis/Septic Shock)  Goal: Absence of Bleeding  Outcome: Ongoing, Progressing     Problem: Glycemic Control Impaired (Sepsis/Septic Shock)  Goal: Blood Glucose Level Within Desired Range  Outcome: Ongoing, Progressing     Problem: Infection Progression (Sepsis/Septic Shock)  Goal: Absence of Infection Signs and Symptoms  Outcome: Ongoing, Progressing

## 2023-12-29 NOTE — PLAN OF CARE
[facility] ([place] [address])      HOME HEALTH ORDERS  FACE TO FACE ENCOUNTER    Patient Name: Jessica Floyd  YOB: 1950    PCP: Effie Olmedo MD   PCP Address: [address] [place] [address]  PCP Phone Number: [phone]  PCP Fax: [phone]    Encounter Date: 12/25/23    Admit to Home Health    Diagnoses:  Active Hospital Problems    Diagnosis  POA    *Acute hypoxic respiratory failure [J96.01]  Yes    Influenza A [J10.1]  Yes    Moderate malnutrition [E44.0]  Yes    Sepsis [A41.9]  Yes    Weight loss [R63.4]  Yes    Pulmonary emphysema [J43.9]  Yes     Chronic    Aortic atherosclerosis [I70.0]  Yes    Gout [M10.9]  Yes     Chronic    Osteoporosis [M81.0]  Yes    Hypertension [I10]  Yes     Chronic    Gastroesophageal reflux disease without esophagitis [K21.9]  Yes     Chronic    Coronary artery disease involving native coronary artery of native heart without angina pectoris [I25.10]  Yes     Chronic     Outside [facility] 2014:  mLAD 20%  mCx 50%  mRCA 20%      Dyslipidemia [E78.5]  Yes     Chronic      Resolved Hospital Problems   No resolved problems to display.       Follow Up Appointments:  Future Appointments   Date Time Provider Department Center   1/3/2024  9:00 AM Fernanda Manzanares MD [facility] [id number] [facility]   2/26/2024 10:30 AM Armani Cai, PA-C [facility] [id number] [facility]   3/5/2024  9:00 AM Michael Lal MD [facility] [id number] [facility]   6/17/2024  9:00 AM [facility] [id number] [facility] BMD [facility]       Allergies:Review of patient's allergies indicates:  No Known Allergies    Medications: Review discharge medications with patient and family and provide education.    Current Facility-Administered Medications   Medication Dose Route Frequency Provider Last Rate Last Admin    acetaminophen tablet 650 mg  650 mg Oral Q4H PRN Alejo Bonilla DO   650 mg at 12/25/23 0931    albuterol-ipratropium 2.5 mg-0.5 mg/3 mL nebulizer solution 3 mL  3 mL Nebulization Q4H Edgar  MD Earline   3 mL at 12/29/23 0810    allopurinoL tablet 200 mg  200 mg Oral Daily Alejo Bonilla, DO   200 mg at 12/28/23 0941    aluminum & magnesium hydroxide-simethicone 400-400-40 mg/5 mL suspension 30 mL  30 mL Oral Q6H PRN Alejo Bonilla, DO   30 mL at 12/28/23 2144    amoxicillin-clavulanate 875-125mg per tablet 1 tablet  1 tablet Oral Q12H Alejo Bonilla, DO        aspirin EC tablet 81 mg  81 mg Oral Daily Alejo Bonilla, DO   81 mg at 12/28/23 0941    atorvastatin tablet 80 mg  80 mg Oral Daily Alejo Bonilla DO   80 mg at 12/28/23 0941    bacitracin ointment   Topical (Top) TID Chavez Kidd MD   Given at 12/28/23 0941    calcium carbonate 200 mg calcium (500 mg) chewable tablet 1,000 mg  1,000 mg Oral BID PRN Rick Ventura DO   1,000 mg at 12/25/23 1348    carvediloL tablet 6.25 mg  6.25 mg Oral BID WM Alejo Bonilla DO   6.25 mg at 12/28/23 1817    dextrose 10% bolus 125 mL 125 mL  12.5 g Intravenous PRN Alejo Bonilla, DO        dextrose 10% bolus 250 mL 250 mL  25 g Intravenous PRN Alejo Bonilla, DO        enoxaparin injection 40 mg  40 mg Subcutaneous Q24H (prophylaxis, 1700) Rick Ventura, DO   40 mg at 12/28/23 1817    fluticasone furoate-vilanteroL 100-25 mcg/dose diskus inhaler 1 puff  1 puff Inhalation Daily Alejo Bonilla DO   1 puff at 12/29/23 0812    folic acid tablet 1 mg  1 mg Oral Daily Alejo Bonilla, DO   1 mg at 12/28/23 0941    furosemide tablet 20 mg  20 mg Oral Daily Alejo Bonilla, DO        glucagon (human recombinant) injection 1 mg  1 mg Intramuscular PRN Alejo Bonilla, DO        glucose chewable tablet 16 g  16 g Oral PRN Alejo Bonilla,         glucose chewable tablet 24 g  24 g Oral PRN Alejo Bonilla DO        guaiFENesin 100 mg/5 ml syrup 200 mg  200 mg Oral Q4H Alejo Bonilla DO   200 mg at 12/29/23 0944    melatonin tablet 6 mg  6 mg Oral Nightly PRN Alejo Bonilla DO   6 mg at 12/25/23 2212    melatonin tablet 6 mg  6 mg Oral Nightly PRN  Alejo Bonilla, DO        metoclopramide HCl tablet 10 mg  10 mg Oral TID AC PRN Alejo Bonilla, DO        montelukast tablet 10 mg  10 mg Oral QHS Alejo Bonilla, DO   10 mg at 12/25/23 2212    naloxone 0.4 mg/mL injection 0.02 mg  0.02 mg Intravenous PRN Alejo Bonilla, DO        oseltamivir capsule 30 mg  30 mg Oral BID Earline Hernández MD   30 mg at 12/28/23 0941    pantoprazole EC tablet 40 mg  40 mg Oral Daily Alejo Bonilla, DO   40 mg at 12/28/23 0941    predniSONE tablet 40 mg  40 mg Oral Daily Alejo Bonilla, DO   40 mg at 12/28/23 0941    QUEtiapine tablet 25 mg  25 mg Oral Once Chavez Kidd MD        QUEtiapine tablet 50 mg  50 mg Oral QHS Alejo Bonilla, DO   50 mg at 12/28/23 2033    simethicone chewable tablet 80 mg  1 tablet Oral QID PRN Alejo Bonilla, DO        sodium chloride 0.9% flush 10 mL  10 mL Intravenous Q12H PRN Alejo Bonilla, DO        thiamine tablet 100 mg  100 mg Oral Daily Alejo Bonilla, DO   100 mg at 12/28/23 0941    tiotropium bromide 2.5 mcg/actuation inhaler 2 puff  2 puff Inhalation Daily Alejo Bonilla, DO   2 puff at 12/29/23 0813     Facility-Administered Medications Ordered in Other Encounters   Medication Dose Route Frequency Provider Last Rate Last Admin    mupirocin 2 % ointment   Nasal On Call Procedure Kang Diaz MD   Given at 10/25/23 1045    tranexamic acid (CYKLOKAPRON) 1,000 mg in sodium chloride 0.9 % 100 mL IVPB (MB+)  1,000 mg Intravenous Once Kang Diaz MD        tranexamic acid (CYKLOKAPRON) 1,000 mg in sodium chloride 0.9 % 100 mL IVPB (MB+)  1,000 mg Intravenous Once Kang Diaz MD         Current Discharge Medication List        START taking these medications    Details   albuterol-ipratropium (DUO-NEB) 2.5 mg-0.5 mg/3 mL nebulizer solution Take 3 mLs by nebulization every 6 (six) hours as needed for Wheezing. Rescue  Qty: 90 mL, Refills: 0      aluminum & magnesium hydroxide-simethicone (MYLANTA MAX STRENGTH) 400-400-40 mg/5  mL suspension Take 30 mLs by mouth every 6 (six) hours as needed.  Qty: 300 mL, Refills: 0      amoxicillin-clavulanate 875-125mg (AUGMENTIN) 875-125 mg per tablet Take 1 tablet by mouth 2 (two) times daily. for 5 doses  Qty: 5 tablet, Refills: 0      bacitracin 500 unit/gram ointment Apply topically 3 (three) times daily.  Refills: 0      calcium carbonate (TUMS) 200 mg calcium (500 mg) chewable tablet Take 2 tablets (1,000 mg total) by mouth 2 (two) times daily as needed for Heartburn.      furosemide (LASIX) 20 MG tablet Take 1 tablet (20 mg total) by mouth daily as needed (Leg swelling, SOB, Weight gain 3lb in 1 day or 5 lbs in 2 days.).  Qty: 30 tablet, Refills: 11      guaiFENesin (MUCINEX) 600 mg 12 hr tablet Take 1 tablet (600 mg total) by mouth 2 (two) times daily. for 14 days  Qty: 28 tablet, Refills: 0      metoclopramide HCl (REGLAN) 10 MG tablet Take 1 tablet (10 mg total) by mouth before meals as needed (Abdominal Pain).  Qty: 20 tablet, Refills: 0      oseltamivir (TAMIFLU) 30 MG capsule Take 1 capsule (30 mg total) by mouth 2 (two) times daily. for 1 dose  Qty: 1 capsule, Refills: 0      predniSONE (DELTASONE) 10 MG tablet Take 2 tablets by mouth once daily for 2 days, THEN 1 tablet once daily for 2 days, THEN 0.5 tablets once daily for 2 days. Please take your lasix everyday while you take your prednisone.  Qty: 7 tablet, Refills: 0           CONTINUE these medications which have NOT CHANGED    Details   acetaminophen (TYLENOL) 650 MG TbSR Take 1 tablet (650 mg total) by mouth every 6 to 8 hours as needed (pain (mild-moderate)).  Qty: 120 tablet, Refills: 0    Comments: PO delivery  Associated Diagnoses: Post-operative state      allopurinoL (ZYLOPRIM) 100 MG tablet Take 2 tablets (200 mg total) by mouth once daily.  Qty: 180 tablet, Refills: 3    Associated Diagnoses: Chronic gout without tophus, unspecified cause, unspecified site      amLODIPine (NORVASC) 5 MG tablet TAKE 1 TABLET BY MOUTH EVERY  DAY  Qty: 90 tablet, Refills: 1    Comments: DX Code Needed  . - .  Associated Diagnoses: Essential hypertension      aspirin (ECOTRIN) 81 MG EC tablet Take 81 mg by mouth once daily.      atorvastatin (LIPITOR) 80 MG tablet TAKE 1 TABLET BY MOUTH EVERY DAY  Qty: 90 tablet, Refills: 3    Associated Diagnoses: Dyslipidemia      carvediloL (COREG) 6.25 MG tablet Take 1 tablet (6.25 mg total) by mouth 2 (two) times daily with meals.  Qty: 60 tablet, Refills: 11    Comments: .      colchicine, gout, (COLCRYS) 0.6 mg tablet Take 1 tablet (0.6 mg total) by mouth once daily. As needed for gout  Qty: 30 tablet, Refills: 11    Associated Diagnoses: Gout, unspecified cause, unspecified chronicity, unspecified site      folic acid (FOLVITE) 1 MG tablet Take 1 tablet (1 mg total) by mouth once daily.  Qty: 90 tablet, Refills: 3      pantoprazole (PROTONIX) 40 MG tablet Take 1 tablet (40 mg total) by mouth once daily.  Qty: 90 tablet, Refills: 0    Associated Diagnoses: Gastroesophageal reflux disease without esophagitis      celecoxib (CELEBREX) 200 MG capsule Take 1 capsule (200 mg total) by mouth as needed.    Comments: PO delivery  Associated Diagnoses: Post-operative state      COMIRNATY 2023-24, 12Y UP,,PF, 30 mcg/0.3 mL inection       FLUAD QUAD 2023-24,65Y UP,,PF, 60 mcg (15 mcg x 4)/0.5 mL Syrg       fluticasone furoate-vilanteroL (BREO) 100-25 mcg/dose diskus inhaler Inhale 1 puff into the lungs once daily. Controller  Qty: 30 each, Refills: 11    Associated Diagnoses: SOB (shortness of breath) on exertion      fluticasone propionate (FLONASE) 50 mcg/actuation nasal spray SPRAY 2 pumps INTO EACH NOSTRIL ONCE DAILY  Qty: 16 mL, Refills: 3    Associated Diagnoses: Viral upper respiratory tract infection      hydroCHLOROthiazide (HYDRODIURIL) 25 MG tablet Take 1 tablet (25 mg total) by mouth once daily.  Qty: 90 tablet, Refills: 3    Associated Diagnoses: Primary hypertension      melatonin (MELATIN) 3 mg tablet Take 2  tablets (6 mg total) by mouth nightly as needed for Insomnia.  Refills: 0      montelukast (SINGULAIR) 10 mg tablet Take 1 tablet (10 mg total) by mouth every evening.  Qty: 90 tablet, Refills: 11      multivitamin (THERAGRAN) tablet Take 1 tablet by mouth once daily.  Qty: 90 tablet, Refills: 3      ORTHOVISC 30 mg/2 mL       prenatal no122/iron/folic acid (PRENATAL MULTI ORAL) Take 1 tablet by mouth once daily.      QUEtiapine (SEROQUEL) 50 MG tablet Take 1 tablet (50 mg total) by mouth every evening.  Qty: 30 tablet, Refills: 0      tiotropium bromide (SPIRIVA RESPIMAT) 2.5 mcg/actuation inhaler Inhale 2 puffs into the lungs once daily. Controller  Qty: 4 g, Refills: 2    Associated Diagnoses: SOB (shortness of breath) on exertion      VENOFER 100 mg iron/5 mL injection                I have seen and examined this patient within the last 30 days. My clinical findings that support the need for the home health skilled services and home bound status are the following:no   Weakness/numbness causing balance and gait disturbance due to Heart Failure, COPD Exacerbation, and Weakness/Debility making it taxing to leave home.     Diet:   cardiac diet and fluid restriction    Labs:  SN to perform labs:  CBC: Weekly; 8 week(s) and BMP: Weekly; 8 week(s)    Referrals/ Consults  Physical Therapy to evaluate and treat. Evaluate for home safety and equipment needs; Establish/upgrade home exercise program. Perform / instruct on therapeutic exercises, gait training, transfer training, and Range of Motion.  Occupational Therapy to evaluate and treat. Evaluate home environment for safety and equipment needs. Perform/Instruct on transfers, ADL training, ROM, and therapeutic exercises.  Aide to provide assistance with personal care, ADLs, and vital signs.    Activities:   activity as tolerated    Nursing:   Agency to admit patient within 24 hours of hospital discharge unless specified on physician order or at patient request    SN to  complete comprehensive assessment including routine vital signs. Instruct on disease process and s/s of complications to report to MD. Review/verify medication list sent home with the patient at time of discharge  and instruct patient/caregiver as needed. Frequency may be adjusted depending on start of care date.     Skilled nurse to perform up to 3 visits PRN for symptoms related to diagnosis    Notify MD if SBP > 160 or < 90; DBP > 90 or < 50; HR > 120 or < 50; Temp > 101; O2 < 88%; Other:   Weight gain/fluid overload    Ok to schedule additional visits based on staff availability and patient request on consecutive days within the home health episode.    When multiple disciplines ordered:    Start of Care occurs on Sunday - Wednesday schedule remaining discipline evaluations as ordered on separate consecutive days following the start of care.    Thursday SOC -schedule subsequent evaluations Friday and Monday the following week.     Friday - Saturday SOC - schedule subsequent discipline evaluations on consecutive days starting Monday of the following week.    For all post-discharge communication and subsequent orders please contact patient's primary care physician. If unable to reach primary care physician or do not receive response within 30 minutes, please contact PCP for clinical staff order clarification    Miscellaneous   Routine Skin for Bedridden Patients: Instruct patient/caregiver to apply moisture barrier cream to all skin folds and wet areas in perineal area daily and after baths and all bowel movements.  Heart Failure:      SN to instruct on the following:    Instruct on the definition of CHF.   Instruct on the signs/sympoms of CHF to be reported.   Instruct on and monitor daily weights.   Instruct on factors that cause exacerbation.   Instruct on action, dose, schedule, and side effects of medications.   Instruct on diet as prescribed.   Instruct on activity allowed.   Instruct on life-style  modifications for life long management of CHF   SN to assess compliance with daily weights, diet, medications, fluid retention,    safety precautions, activities permitted and life-style modifications.   Additional 1-2 SN visits per week as needed for signs and symptoms     of CHF exacerbation.      For Weight Gain > 2-3 lbs in 1 day or 4-6 lbs over 1 week notify PCP:  CHF DIURETIC SLIDING SCALE     Skilled nurse visits daily to instruct and monitor medication adherence until target weight. Then resume prior order frequency.     If weight gain exceeds 5 lbs over target weight, call MD  If weight gain of 3-4 lbs over target weight, then:     Increase Furosemide - current dose (mg/day) to   double dose and frequency of daily until target weight achieved or maximum 3 days.     If already on max oral daily dose, see IV diuretic instructions.    After 3 days of increased oral diuretic dose, get BMP. If patient is on increased oral diuretic dose greater than 5 days, repeat BMP at day 7 of increased dose.     Potassium supplementation   Scr > 1.5 mg/dl  Scr  1.5 mg/dl    K < 3.0 - NOTIFY MD and 40 mEq bid  40 mEq tid   K- 3.1-3.3  20 mEq bid  20 mEq tid    K 3.4-3.7  10 mEq bid  10 mEq tid      If target weight not reached after 5 days of increased oral diuretic, proceed to IV diuretic with daily patient contact to include face-to-face visit and telephone encounters.       Home Oxygen:  Oxygen at 1-3 L/min nasal canula to be used:  As needed for SOB., Assess oxygen saturation via pulse oximeter as needed for increase in SOB., Notify physician if oxygen saturation less than 88%, Consult respiratory therapy for instruction on home oxygen use, and Breathing treatments via nebulizer if needed.    Home Health Aide:  Nursing Weekly, Physical Therapy Three times weekly, Occupational Therapy Three times weekly, Respiratory Therapy Three times weekly, and Home Health Aide Weekly    Wound Care Orders  yes:  Pressure Ulcer(s) Stage  II :   Location: Coccyx/ Right buttock- altered skin integrity: 1)clean wound and surrounding area with normal saline. 2)fill R buttock wound with Aquacel Ag rope and cover coccyx wound with same dressing.  3)secure Aquacel Ag with border foam dressing. Perform care every other day.            I certify that this patient is confined to her home and needs intermittent skilled nursing care, physical therapy, and occupational therapy.

## 2023-12-30 LAB
BACTERIA BLD CULT: NORMAL
BACTERIA BLD CULT: NORMAL

## 2023-12-31 ENCOUNTER — PATIENT MESSAGE (OUTPATIENT)
Dept: ORTHOPEDICS | Facility: CLINIC | Age: 73
End: 2023-12-31
Payer: MEDICARE

## 2024-01-03 ENCOUNTER — TELEPHONE (OUTPATIENT)
Dept: PRIMARY CARE CLINIC | Facility: CLINIC | Age: 74
End: 2024-01-03
Payer: MEDICARE

## 2024-01-03 NOTE — TELEPHONE ENCOUNTER
"----- Message from Alice Shafer sent at 1/3/2024 11:13 AM CST -----   Name of Who is Calling:     What is the request in detail: request call back in reference to patient has change insurance from [employer] to [employer]"[facility]    and  [facility] home health is now out of network / please arrange for home health with another agency  patient new insurance ID# [id number]  Please contact to further discuss and advise      Can the clinic reply by [facility]:     What Number to Call Back if not in [facility]:  Steven / [facility]  home health [phone]     "

## 2024-01-05 ENCOUNTER — PATIENT MESSAGE (OUTPATIENT)
Dept: OTOLARYNGOLOGY | Facility: CLINIC | Age: 74
End: 2024-01-05
Payer: MEDICARE

## 2024-01-06 ENCOUNTER — DOCUMENT SCAN (OUTPATIENT)
Dept: HOME HEALTH SERVICES | Facility: HOSPITAL | Age: 74
End: 2024-01-06
Payer: MEDICARE

## 2024-01-07 ENCOUNTER — PATIENT MESSAGE (OUTPATIENT)
Dept: PRIMARY CARE CLINIC | Facility: CLINIC | Age: 74
End: 2024-01-07
Payer: MEDICARE

## 2024-01-08 RX ORDER — QUETIAPINE FUMARATE 50 MG/1
50 TABLET, FILM COATED ORAL NIGHTLY
Qty: 90 TABLET | Refills: 0 | Status: SHIPPED | OUTPATIENT
Start: 2024-01-08 | End: 2024-02-23 | Stop reason: SDUPTHER

## 2024-01-13 ENCOUNTER — PATIENT MESSAGE (OUTPATIENT)
Dept: PRIMARY CARE CLINIC | Facility: CLINIC | Age: 74
End: 2024-01-13
Payer: MEDICARE

## 2024-01-13 DIAGNOSIS — M10.00 ACUTE IDIOPATHIC GOUT, UNSPECIFIED SITE: Chronic | ICD-10-CM

## 2024-01-13 DIAGNOSIS — M10.9 GOUT, UNSPECIFIED CAUSE, UNSPECIFIED CHRONICITY, UNSPECIFIED SITE: Primary | ICD-10-CM

## 2024-01-14 ENCOUNTER — PATIENT MESSAGE (OUTPATIENT)
Dept: PRIMARY CARE CLINIC | Facility: CLINIC | Age: 74
End: 2024-01-14
Payer: MEDICARE

## 2024-01-14 DIAGNOSIS — M75.101 RIGHT ROTATOR CUFF TEAR ARTHROPATHY: ICD-10-CM

## 2024-01-14 DIAGNOSIS — J96.01 ACUTE HYPOXIC RESPIRATORY FAILURE: ICD-10-CM

## 2024-01-14 DIAGNOSIS — M12.811 RIGHT ROTATOR CUFF TEAR ARTHROPATHY: ICD-10-CM

## 2024-01-14 DIAGNOSIS — I25.10 CORONARY ARTERY DISEASE INVOLVING NATIVE CORONARY ARTERY OF NATIVE HEART WITHOUT ANGINA PECTORIS: Primary | Chronic | ICD-10-CM

## 2024-01-14 DIAGNOSIS — Z87.81 HISTORY OF HIP FRACTURE: ICD-10-CM

## 2024-01-17 ENCOUNTER — PATIENT MESSAGE (OUTPATIENT)
Dept: HEMATOLOGY/ONCOLOGY | Facility: CLINIC | Age: 74
End: 2024-01-17
Payer: MEDICARE

## 2024-01-17 RX ORDER — COLCHICINE 0.6 MG/1
0.6 TABLET ORAL DAILY
Qty: 30 TABLET | Refills: 11 | Status: SHIPPED | OUTPATIENT
Start: 2024-01-17 | End: 2025-01-16

## 2024-01-22 ENCOUNTER — TELEPHONE (OUTPATIENT)
Dept: TRANSPLANT | Facility: CLINIC | Age: 74
End: 2024-01-22
Payer: MEDICARE

## 2024-01-22 ENCOUNTER — OFFICE VISIT (OUTPATIENT)
Dept: TRANSPLANT | Facility: CLINIC | Age: 74
End: 2024-01-22
Payer: MEDICARE

## 2024-01-22 VITALS
RESPIRATION RATE: 20 BRPM | HEIGHT: 63 IN | SYSTOLIC BLOOD PRESSURE: 145 MMHG | HEART RATE: 85 BPM | BODY MASS INDEX: 21.09 KG/M2 | DIASTOLIC BLOOD PRESSURE: 75 MMHG | OXYGEN SATURATION: 96 % | WEIGHT: 119 LBS | TEMPERATURE: 98 F

## 2024-01-22 DIAGNOSIS — J43.2 CENTRILOBULAR EMPHYSEMA: Primary | ICD-10-CM

## 2024-01-22 DIAGNOSIS — J96.11 CHRONIC HYPOXEMIC RESPIRATORY FAILURE: ICD-10-CM

## 2024-01-22 DIAGNOSIS — F43.10 PTSD (POST-TRAUMATIC STRESS DISORDER): ICD-10-CM

## 2024-01-22 DIAGNOSIS — J30.9 ALLERGIC RHINITIS, UNSPECIFIED SEASONALITY, UNSPECIFIED TRIGGER: ICD-10-CM

## 2024-01-22 PROCEDURE — 99214 OFFICE O/P EST MOD 30 MIN: CPT | Mod: S$GLB,,, | Performed by: INTERNAL MEDICINE

## 2024-01-22 PROCEDURE — 99999 PR PBB SHADOW E&M-EST. PATIENT-LVL IV: CPT | Mod: PBBFAC,,, | Performed by: INTERNAL MEDICINE

## 2024-01-23 ENCOUNTER — TELEPHONE (OUTPATIENT)
Dept: TRANSPLANT | Facility: CLINIC | Age: 74
End: 2024-01-23
Payer: MEDICARE

## 2024-01-23 NOTE — PROGRESS NOTES
ADVANCED LUNG DISEASE INITIAL VISIT                                                                                                                                             Reason for Visit:  Dyspnea    Referring Physician: No ref. provider found    History of Present Illness: Jessica Floyd is a 73 y.o. female who is on 1-2L of oxygen.  She is on no assisted ventilation.  Her New York Heart Association Class is II and a Karnofsky score of 90% - Able to carry on normal activity: minor symptoms of disease. She is not diabetic.    Pt presents for follow-up of her COPD.  She has COPD with PH on TTE.  She was hospitalized in December for acute on chronic hypoxic RF 2/2 influenza.  She recovered and now feels back to her baseline.  She takes her inhalers as directed.  Has an albuterol prn inhaler but does not need to use it.  Remains active.  Does not have hypercapnea or require NIPPV.  She would like testing to see how much O2 she needs as she is planning a trip and does not have a portable concentrator to travel with.  She also endorses panic attacks and anxiety/PTSD over medical traumas in the past.  Has been trying to establish with psych without any luck.  Overall, feels at her baseline.         Past Medical History:   Diagnosis Date    Allergic rhinitis     Amblyopia     Carotid stenosis     Coronary atherosclerosis     Outside [facility] 6/2014: 20% mLAD, 50% mCx, 20%, mRCA    Dyslipidemia     ESBL (extended spectrum beta-lactamase) producing bacteria infection     UTI    Hypertension     Nausea and vomiting 05/26/2017    Pulmonary emphysema 10/28/2023    SAH (subarachnoid hemorrhage) 08/24/2016 1999, s/p clipping    Subarachnoid hemorrhage due to ruptured aneurysm 1999       Past Surgical History:   Procedure Laterality Date    ADENOIDECTOMY      ANTERIOR CRUCIATE LIGAMENT REPAIR  2012    APPENDECTOMY      CATARACT EXTRACTION W/  INTRAOCULAR LENS IMPLANT Right 11/07/2022    Procedure: EXTRACTION, CATARACT, WITH IOL  INSERTION;  Surgeon: Higinio Cristina MD;  Location: [facility];  Service: Ophthalmology;  Laterality: Right;    CATARACT EXTRACTION W/  INTRAOCULAR LENS IMPLANT Left 11/21/2022    Procedure: EXTRACTION, CATARACT, WITH IOL INSERTION;  Surgeon: Higinio Cristina MD;  Location: [facility];  Service: Ophthalmology;  Laterality: Left;    CEREBRAL ANEURYSM REPAIR  1999    [facility]    DENTAL SURGERY      EYE SURGERY Bilateral     cataract removal and lens implants    HIP ARTHROPLASTY Left 05/28/2023    Procedure: TOTAL HIP ARTHROPLASTY;  Surgeon: Juan Wan MD;  Location: [address];  Service: Orthopedics;  Laterality: Left;    TONSILLECTOMY         Allergies: Patient has no known allergies.    Current Outpatient Medications   Medication Sig    acetaminophen (TYLENOL) 650 MG TbSR Take 1 tablet (650 mg total) by mouth every 6 to 8 hours as needed (pain (mild-moderate)).    albuterol-ipratropium (DUO-NEB) 2.5 mg-0.5 mg/3 mL nebulizer solution Take 3 mLs by nebulization every 6 (six) hours as needed for Wheezing. Rescue    allopurinoL (ZYLOPRIM) 100 MG tablet Take 2 tablets (200 mg total) by mouth once daily.    aluminum & magnesium hydroxide-simethicone (MYLANTA MAX STRENGTH) 400-400-40 mg/5 mL suspension Take 30 mLs by mouth every 6 (six) hours as needed.    amLODIPine (NORVASC) 5 MG tablet TAKE 1 TABLET BY MOUTH EVERY DAY    aspirin (ECOTRIN) 81 MG EC tablet Take 81 mg by mouth once daily.    atorvastatin (LIPITOR) 80 MG tablet TAKE 1 TABLET BY MOUTH EVERY DAY    bacitracin 500 unit/gram ointment Apply topically 3 (three) times daily.    calcium carbonate (TUMS) 200 mg calcium (500 mg) chewable tablet Take 2 tablets (1,000 mg total) by mouth 2 (two) times daily as needed for Heartburn.    carvediloL (COREG) 6.25 MG tablet Take 1 tablet (6.25 mg total) by mouth 2 (two) times daily with meals.    celecoxib (CELEBREX) 200 MG capsule Take 1 capsule (200 mg total) by mouth as needed.    colchicine (COLCRYS) 0.6 mg tablet  Take 1 tablet (0.6 mg total) by mouth once daily. As needed for gout    COMIRNATY 2023-24, 12Y UP,,PF, 30 mcg/0.3 mL inection     FLUAD QUAD 2023-24,65Y UP,,PF, 60 mcg (15 mcg x 4)/0.5 mL Syrg     fluticasone furoate-vilanteroL (BREO) 100-25 mcg/dose diskus inhaler Inhale 1 puff into the lungs once daily. Controller    fluticasone propionate (FLONASE) 50 mcg/actuation nasal spray SPRAY 2 pumps INTO EACH NOSTRIL ONCE DAILY    folic acid (FOLVITE) 1 MG tablet Take 1 tablet (1 mg total) by mouth once daily.    furosemide (LASIX) 20 MG tablet Take 1 tablet (20 mg total) by mouth daily as needed (Leg swelling, SOB, Weight gain 3lb in 1 day or 5 lbs in 2 days.).    hydroCHLOROthiazide (HYDRODIURIL) 25 MG tablet Take 1 tablet (25 mg total) by mouth once daily.    melatonin (MELATIN) 3 mg tablet Take 2 tablets (6 mg total) by mouth nightly as needed for Insomnia.    metoclopramide HCl (REGLAN) 10 MG tablet Take 1 tablet (10 mg total) by mouth before meals as needed (Abdominal Pain).    montelukast (SINGULAIR) 10 mg tablet Take 1 tablet (10 mg total) by mouth every evening.    multivitamin (THERAGRAN) tablet Take 1 tablet by mouth once daily.    ORTHOVISC 30 mg/2 mL     pantoprazole (PROTONIX) 40 MG tablet Take 1 tablet (40 mg total) by mouth once daily.    prenatal no122/iron/folic acid (PRENATAL MULTI ORAL) Take 1 tablet by mouth once daily.    QUEtiapine (SEROQUEL) 50 MG tablet Take 1 tablet (50 mg total) by mouth every evening.    tiotropium bromide (SPIRIVA RESPIMAT) 2.5 mcg/actuation inhaler Inhale 2 puffs into the lungs once daily. Controller    VENOFER 100 mg iron/5 mL injection      No current facility-administered medications for this visit.     Facility-Administered Medications Ordered in Other Visits   Medication    mupirocin 2 % ointment    tranexamic acid (CYKLOKAPRON) 1,000 mg in sodium chloride 0.9 % 100 mL IVPB (MB+)    tranexamic acid (CYKLOKAPRON) 1,000 mg in sodium chloride 0.9 % 100 mL IVPB (MB+)        Immunization History   Administered Date(s) Administered    COVID-19, MRNA, LN-S, PF (Pfizer) (Gray Cap) 2022    COVID-19, MRNA, LN-S, PF (Pfizer) (Purple Cap) 2021, 2021, 2021    COVID-19, mRNA, LNP-S, PF, astrid-sucrose, 30 mcg/0.3 mL (Pfizer  Ages 12+) 2023    Influenza - High Dose - PF (65 years and older) 10/03/2016, 2017, 10/16/2018, 2019    Influenza - Quadrivalent - High Dose - PF (65 years and older) 10/02/2020, 10/08/2021, 2022    Pneumococcal Conjugate - 13 Valent 10/03/2016    Pneumococcal Polysaccharide - 23 Valent 2017    RSVpreF (Arexvy) 2023    Tdap 2017    Zoster Recombinant 10/08/2021, 2022     Family History:    Family History   Problem Relation Age of Onset    Hypertension Mother     Aneurysm Mother     Hypertension Father     Alcohol abuse Father     Kidney disease Brother     Heart disease Brother     Gout Brother     No Known Problems Daughter     No Known Problems Daughter     No Known Problems Daughter      Social History     Substance and Sexual Activity   Alcohol Use Yes    Alcohol/week: 7.0 standard drinks of alcohol    Types: 7 Glasses of wine per week    Comment: One glass of Red wine prior to dinner      Social History     Substance and Sexual Activity   Drug Use No      Social History     Socioeconomic History    Marital status:    Occupational History    Occupation: Retired   Tobacco Use    Smoking status: Former     Current packs/day: 0.00     Average packs/day: 0.5 packs/day for 20.0 years (10.0 ttl pk-yrs)     Types: Cigarettes     Start date: 1979     Quit date: 1999     Years since quittin.0     Passive exposure: Past    Smokeless tobacco: Never   Substance and Sexual Activity    Alcohol use: Yes     Alcohol/week: 7.0 standard drinks of alcohol     Types: 7 Glasses of wine per week     Comment: One glass of Red wine prior to dinner    Drug use: No    Sexual activity: Yes      Partners: Male   Social History Narrative    .  Has 3 grown daughters.       Social Determinants of Health     Financial Resource Strain: Low Risk  (11/27/2023)    Overall Financial Resource Strain (CARDIA)     Difficulty of Paying Living Expenses: Not hard at all   Food Insecurity: No Food Insecurity (11/27/2023)    Hunger Vital Sign     Worried About Running Out of Food in the Last Year: Never true     Ran Out of Food in the Last Year: Never true   Transportation Needs: No Transportation Needs (11/27/2023)    PRAPARE - Transportation     Lack of Transportation (Medical): No     Lack of Transportation (Non-Medical): No   Physical Activity: Insufficiently Active (11/27/2023)    Exercise Vital Sign     Days of Exercise per Week: 1 day     Minutes of Exercise per Session: 30 min   Stress: No Stress Concern Present (11/27/2023)    [employer] - Occupational Stress Questionnaire     Feeling of Stress : Not at all   Recent Concern: Stress - Stress Concern Present (10/28/2023)    [employer] - Occupational Stress Questionnaire     Feeling of Stress : To some extent   Social Connections: Socially Integrated (11/27/2023)    Social Connection and Isolation Panel [NHANES]     Frequency of Communication with Friends and Family: More than three times a week     Frequency of Social Gatherings with Friends and Family: More than three times a week     Attends Sabianist Services: More than 4 times per year     Active Member of Clubs or Organizations: Yes     Attends Club or Organization Meetings: 1 to 4 times per year     Marital Status:    Housing Stability: Low Risk  (11/27/2023)    Housing Stability Vital Sign     Unable to Pay for Housing in the Last Year: No     Number of Places Lived in the Last Year: 1     Unstable Housing in the Last Year: No     Review of Systems   Constitutional:  Negative for chills, diaphoresis, fever, malaise/fatigue and weight  "loss.   HENT:  Negative for congestion, ear discharge, ear pain, hearing loss, nosebleeds, sinus pain, sore throat and tinnitus.    Eyes:  Negative for blurred vision, double vision, photophobia, pain, discharge and redness.   Respiratory:  Positive for shortness of breath. Negative for cough, hemoptysis, sputum production, wheezing and stridor.    Cardiovascular:  Negative for chest pain, palpitations, orthopnea, claudication, leg swelling and PND.   Gastrointestinal:  Negative for abdominal pain, blood in stool, constipation, diarrhea, heartburn, melena, nausea and vomiting.   Genitourinary:  Negative for dysuria, flank pain, frequency, hematuria and urgency.   Musculoskeletal:  Negative for back pain, falls, joint pain, myalgias and neck pain.   Skin:  Negative for itching and rash.   Neurological:  Negative for dizziness, tingling, tremors, sensory change, speech change, focal weakness, seizures, loss of consciousness, weakness and headaches.   Endo/Heme/Allergies:  Negative for environmental allergies and polydipsia. Does not bruise/bleed easily.   Psychiatric/Behavioral:  Negative for depression, hallucinations, memory loss, substance abuse and suicidal ideas. The patient is not nervous/anxious and does not have insomnia.      Vitals  BP (!) 145/75 (BP Location: Right arm, Patient Position: Sitting, BP Method: Small (Automatic))   Pulse 85   Temp 97.8 °F (36.6 °C) (Oral)   Resp 20   Ht 5' 3" (1.6 m)   Wt 54 kg (119 lb)   SpO2 96% Comment: Room Air  BMI 21.08 kg/m²   Physical Exam  Vitals and nursing note reviewed.   Constitutional:       General: She is not in acute distress.     Appearance: She is well-developed. She is not diaphoretic.   HENT:      Head: Normocephalic and atraumatic.      Nose: Nose normal.      Mouth/Throat:      Pharynx: No oropharyngeal exudate.   Eyes:      General: No scleral icterus.     Conjunctiva/sclera: Conjunctivae normal.      Pupils: Pupils are equal, round, and reactive " to light.   Neck:      Thyroid: No thyromegaly.      Vascular: No JVD.      Trachea: No tracheal deviation.   Cardiovascular:      Rate and Rhythm: Normal rate and regular rhythm.      Heart sounds: Normal heart sounds. No murmur heard.     No friction rub. No gallop.   Pulmonary:      Effort: Pulmonary effort is normal. No respiratory distress.      Breath sounds: Decreased air movement (bilateral) present. No wheezing or rales.   Abdominal:      General: Bowel sounds are normal. There is no distension.      Palpations: Abdomen is soft.      Tenderness: There is no abdominal tenderness.   Musculoskeletal:         General: No deformity. Normal range of motion.      Cervical back: Normal range of motion and neck supple.   Skin:     General: Skin is warm and dry.      Capillary Refill: Capillary refill takes less than 2 seconds.      Coloration: Skin is not pale.      Findings: No erythema or rash.   Neurological:      Mental Status: She is alert and oriented to person, place, and time.      Cranial Nerves: No cranial nerve deficit.   Psychiatric:         Judgment: Judgment normal.         Labs:  No visits with results within 7 Day(s) from this visit.   Latest known visit with results is:   No results displayed because visit has over 200 results.              12/1/2023     8:58 AM   Pulmonary Function Tests   FVC 1.95 liters   FEV1 1.12 liters   TLC (liters) 5.13 liters   DLCO (ml/mmHg sec) 5.92 ml/mmHg sec   FVC% 74   FEV1% 55   FEF 25-75 0.54   FEF 25-75% 31   TLC% 107   RV 3.18   RV% 152   DLCO% 29         12/1/2023     9:05 AM   6MW   6MWT Status completed with stops   Patient Reported Dyspnea   Was O2 used? No   6MW Distance walked (feet) 650 feet   Distance walked (meters) 198.12 meters   Did patient stop? Yes   Oxygen Saturation 96 %   Supplemental Oxygen Room Air   Heart Rate 87 bpm   Blood Pressure 138/72   Jessica Dyspnea Rating  nothing at all   Oxygen Saturation 94 %   Supplemental Oxygen Room Air   Heart  Rate 92 bpm   Blood Pressure 136/81   Jessica Dyspnea Rating  moderate   Recovery Time (seconds) 101 seconds   Oxygen Saturation 97 %   Supplemental Oxygen Room Air   Heart Rate 79 bpm       Imaging:  Results for orders placed in visit on 09/23/18    XR CHEST PA AND LATERAL    Narrative  EXAMINATION:  XR CHEST PA AND LATERAL    CLINICAL HISTORY:  Cough    TECHNIQUE:  PA and lateral views of the chest were performed.    COMPARISON:  CT 05/25/2017, radiograph 05/25/2017    FINDINGS:  The cardiomediastinal silhouette is not enlarged, noting calcification of the aortic arch..  There is no pleural effusion.  The trachea is midline.  The lungs are symmetrically expanded bilaterally with mildly coarse interstitial attenuation, and mild bilateral basilar subsegmental atelectasis..  No large focal consolidation seen.  There is no pneumothorax.  The osseous structures are remarkable for degenerative changes..    IMPRESSION:    1. No acute cardiopulmonary process.      Electronically signed by: Armani Yuan MD  Date:    09/23/2018  Time:    12:50    Results for orders placed during the hospital encounter of 07/26/23        US Carotid Bilateral    Narrative  EXAMINATION:  US CAROTID BILATERAL    CLINICAL HISTORY:  check patency of known carotid stent given syncope;    TECHNIQUE:  Grayscale and color spectral Doppler ultrasound imaging of the carotid and vertebral artery systems bilaterally.    COMPARISON:  None    FINDINGS:  Patient with reported right common carotid artery stent.    Right:    Internal Carotid Artery (ICA) peak systolic velocity 22 cm/sec    Internal Carotid Artery (ICA) end-diastolic velocity 7 cm/sec    Common Carotid Artery (CCA) peak systolic velocity 30 cm/sec    Common Carotid Artery (CCA) end-diastolic velocity 8 cm/sec    ICA/CCA peak systolic velocity ratio: 0.7    ICA/CCA end-diastolic velocity ratio: 0.8    Plaque formation: Homogeneous    Vertebral artery: Antegrade flow and normal  "waveform.    Left:    Internal Carotid Artery (ICA)  peak systolic velocity 43 cm/sec    Internal Carotid Artery (ICA) end-diastolic velocity 15 cm/sec    Common Carotid Artery (CCA) peak systolic velocity 47 cm/sec    Common Carotid Artery (CCA) end-diastolic velocity 11 cm/sec    ICA/CCA peak systolic velocity ratio: 0.9    ICA/CCA end diastolic velocity ratio: 1.4    Plaque formation: Homogeneous    Vertebral artery: Antegrade flow and normal waveform.    Impression  1. Right common carotid artery stent patent.  2. 1 - 39% stenosis of the right internal carotid artery.  3. 1 - 39% stenosis of the left internal carotid artery.    Electronically signed by resident: Keo Jang  Date:    10/29/2023  Time:    08:45    Electronically signed by: Danis Guerrero Jr  Date:    10/29/2023  Time:    08:50    Results for orders placed during the hospital encounter of 10/28/23    CT Chest Without Contrast    Narrative  EXAMINATION:  CT CHEST WITHOUT CONTRAST    CLINICAL HISTORY:  "Dyspnea, chronic, unclear etiology;"    COMPARISON:  10/28/2023.    TECHNIQUE:  Volumetric data acquisition of the chest from the lung apices to the adrenals was obtained without intravenous contrast. Sagittal and coronal multiplanar reconstructions were performed. Lack of IV contrast material limits the assessment of mediastinal and abdominal structures.    FINDINGS:  The airways are patent.    The thyroid gland is homogeneous, normal in size.    No mediastinal, hilar or subcarinal adenopathy.    The thoracic aorta is of normal caliber and demonstrates moderate atherosclerotic plaque.    The cardiac silhouette appears within normal limits in size, no pericardial effusion.  Coronary artery calcifications seen.  Stent in the right common carotid artery.    The esophagus appears normal in course and caliber.    Three lobular and paraseptal emphysema.    New cluster of micro nodules within the right lower lobe, 302:362.  Small Bochdalek " hernia.    The left lung is well aerated.    No pleural effusion.    The axillary regions appear normal.    The upper abdominal organs demonstrate a left hepatic lobe low attenuating lesion 2.3 cm.    The osseous structures straight mild loss of height at T11-T12 L1, unchanged.  No new fracture.  Moderate degenerative change of the right shoulder joint.    Impression  Small, new cluster of micro nodules, right lower lobe, most often seen in the setting of infection, follow-up recommended after treatment completed to ensure resolution.    Emphysema.    Left hepatic lobe hypoattenuating lesion suggestive of a cyst consider non emergent ultrasound evaluation.    See other details above.    This report was flagged in Epic as abnormal.      Electronically signed by: Joan Fernández MD  Date:    10/31/2023  Time:    15:05        Cardiodiagnostics:  Results for orders placed during the hospital encounter of 10/28/23    Echo    Interpretation Summary    Left Ventricle: The left ventricle is normal in size. Normal wall thickness. Normal wall motion. There is normal systolic function with a visually estimated ejection fraction of 60 - 65%. There is normal diastolic function.    Right Ventricle: Mild right ventricular enlargement. Wall thickness is normal. Right ventricle wall motion  is normal. Systolic function is normal.    Aortic Valve: The aortic valve is a unicuspid valve. There is mild aortic valve sclerosis. There is trace aortic regurgitation.    Mitral Valve: There is mild regurgitation.    Pulmonary Artery: There is mild pulmonary hypertension. The estimated pulmonary artery systolic pressure is 40 mmHg.    IVC/SVC: Intermediate venous pressure at 8 mmHg.        Assessment:  1. Centrilobular emphysema    2. Allergic rhinitis, unspecified seasonality, unspecified trigger    3. Chronic hypoxemic respiratory failure    4. PTSD (post-traumatic stress disorder)      Plan:   Followed for COPD.  On Breo and Spiriva.   Tolerating well.  Recent exacerbation in Dec 2023 2/2 influenza.  Has recovered.  TTE with ePAP of 40mmHg likely related to hyperinflation from emphysema.  Needs a 6MWT for oxygen needs assessment.    Continue with allergy regimen.      States that she has a home concentrator and is on 1-2L at home.  Does not have a portable concentrator.  Will obtain 6MWT for oxygen needs assessment so we can update O2 orders and get a portable concentrator should she qualify.    Refer to psych.     Follow-up in 6 months with PFTs.        Hina Roberts D.O.  Pulmonary/Critical Care and Lung Transplantation  [facility]

## 2024-01-26 ENCOUNTER — PATIENT MESSAGE (OUTPATIENT)
Dept: TRANSPLANT | Facility: CLINIC | Age: 74
End: 2024-01-26
Payer: MEDICARE

## 2024-01-26 ENCOUNTER — TELEPHONE (OUTPATIENT)
Dept: TRANSPLANT | Facility: CLINIC | Age: 74
End: 2024-01-26
Payer: MEDICARE

## 2024-01-26 ENCOUNTER — PATIENT MESSAGE (OUTPATIENT)
Dept: PRIMARY CARE CLINIC | Facility: CLINIC | Age: 74
End: 2024-01-26
Payer: MEDICARE

## 2024-01-26 DIAGNOSIS — J43.2 CENTRILOBULAR EMPHYSEMA: Primary | ICD-10-CM

## 2024-01-26 DIAGNOSIS — R06.02 SOB (SHORTNESS OF BREATH): ICD-10-CM

## 2024-01-26 NOTE — TELEPHONE ENCOUNTER
BRYAN routed internal nebulizer kit/nebulizer supply orders to [facility] (ph: , fx: ). BRYAN in communication w/ VINITA Luciano. BRYAN following and remains available.         ----- Message from Khushi Hampton RN sent at 1/26/2024 10:13 AM CST -----  Good morning.    Can you please send these orders to [facility]? Patient is okay using them.    Thanks!

## 2024-01-26 NOTE — TELEPHONE ENCOUNTER
Patient sent a portal message requesting a nebulizer to use for inhalation treatments, as she received the medication from another provider but not the equipment. Received approval from Dr. Roberts to order the necessary equipment. With the patient's permission, the orders will be sent to [facility].

## 2024-01-27 PROCEDURE — G0180 MD CERTIFICATION HHA PATIENT: HCPCS | Mod: NTX,,, | Performed by: FAMILY MEDICINE

## 2024-01-29 ENCOUNTER — DOCUMENT SCAN (OUTPATIENT)
Dept: HOME HEALTH SERVICES | Facility: HOSPITAL | Age: 74
End: 2024-01-29
Payer: MEDICARE

## 2024-01-31 ENCOUNTER — HOSPITAL ENCOUNTER (OUTPATIENT)
Dept: PULMONOLOGY | Facility: CLINIC | Age: 74
Discharge: HOME | End: 2024-01-31
Payer: MEDICARE

## 2024-01-31 VITALS — HEIGHT: 63 IN | BODY MASS INDEX: 21.09 KG/M2 | WEIGHT: 119.06 LBS

## 2024-01-31 DIAGNOSIS — J43.2 CENTRILOBULAR EMPHYSEMA: ICD-10-CM

## 2024-01-31 PROCEDURE — 94618 PULMONARY STRESS TESTING: CPT | Mod: NTX,S$GLB,, | Performed by: INTERNAL MEDICINE

## 2024-01-31 NOTE — PROCEDURES
Jessica Floyd is a 73 y.o.  female patient, who presents for a 6 minute walk test ordered by DO Alejandra.  The diagnosis is COPD/Emphysema.  The patient's BMI is 21.1 kg/m2.  Predicted distance (lower limit of normal) is 314.92 meters.      Test Results:    The test was completed with stops. The patient stopped 2 times for a total of 79 seconds. The total time walked was 281 seconds. During walking, the patient reported:  Dyspnea, Leg pain (left knee pain). The patient used a walker for assistance during testing.     01/31/2024---------Distance: 198.12 meters (650 feet)     Lap Walk Time O2 Sat % Supplemental Oxygen Heart Rate Blood Pressure Jessica Scale Comments   Pre-exercise  (Resting) 0 0 92 % Room Air 92 bpm 132/81 mmHg 0    During Exercise 1 74 sec 91 % Room Air 96 bpm   Patient rested 13 seconds..   During Exercise 2 184 sec 90 % Room Air 106 bpm   Patient rested 66 seconds.   During Exercise 3 338 sec 90 % Room Air 111 bpm      End of Exercise  360 sec 89 % Room Air 101 bpm 149/71 mmHg 3    Post-exercise  (Recovery)   91 % Room Air  93 bpm        Recovery Time: 72 seconds    Performing nurse/tech: DAVID Presley      PREVIOUS STUDY:   12/01/2023---------Distance: 198.12 meters (650 feet)       Lap Walk Time O2 Sat % Supplemental Oxygen Heart Rate Blood Pressure Jessica Scale Comment   Pre-exercise  (Resting) 0 0 96 % Room Air 87 bpm 138/72 mmHg 0     During Exercise 1 90 sec 95 % Room Air 93 bpm         During Exercise 2 190 sec 95 % Room Air 99 bpm     Rested for 45 seconds   During Exercise 3 330 sec 96 % Room Air 98 bpm         End of Exercise   360 sec 94 % Room Air 92 bpm 136/81 mmHg 3     Post-exercise  (Recovery)     97 % Room Air  79 bpm             CLINICAL INTERPRETATION:  Six minute walk distance is 198.12 meters (650 feet) with moderate dyspnea.  During exercise, there was significant desaturation while breathing room air.  Both blood pressure and heart rate remained stable with walking.  The  patient reported non-pulmonary symptoms during exercise.  Significant exercise impairment is likely due to desaturation and subjective symptoms.  The patient did complete the study, walking 281 seconds of the 360 second test.  Since the previous study in December 2023, exercise capacity is unchanged.  Based upon age and body mass index, exercise capacity is less than predicted.

## 2024-02-01 ENCOUNTER — PATIENT MESSAGE (OUTPATIENT)
Dept: TRANSPLANT | Facility: CLINIC | Age: 74
End: 2024-02-01
Payer: MEDICARE

## 2024-02-10 NOTE — ASSESSMENT & PLAN NOTE
Problem: Discharge Planning  Goal: Discharge to home or other facility with appropriate resources  Outcome: Progressing     Problem: Risk for Elopement  Goal: Patient will not exit the unit/facility without proper excort  Outcome: Progressing  Pt makes frequent threats of leaving, no actual attempts made, spoke with urologist and agrees to remain in hospital setting as long as he see him daily    Problem: Respiratory - Adult  Goal: Achieves optimal ventilation and oxygenation  2/10/2024 1357 by Ruthy Ding, RN  Outcome: Progressing   Shortness of breath w/activity, able to produce a strong cough and clear airway of secretions and position self for comfort, educated on pacing activities, recognizing limitations and when to rest, remains on roomair, pulse ox checks, Spo2 maintained @ >92%    Problem: Safety - Adult  Goal: Free from fall injury  Outcome: Adequate for Discharge    Pt alert/oriented, ambulates as desired, aware of safety limitations and remains within boundaries, fall risk completed every shift, he remains free from falls and injuries, gait steady, utilizes call light appropriately and asks for assistance as needed    Problem: Pain  Goal: Verbalizes/displays adequate comfort level or baseline comfort level  Outcome: Adequate for Discharge   Pain scale reviewed with patient, verbalizes understanding, denies pain/discomfort at this time, will continue to monitor, educate and encourage patient to report discomfort at the earliest onset   -replace prn

## 2024-02-16 ENCOUNTER — DOCUMENT SCAN (OUTPATIENT)
Dept: HOME HEALTH SERVICES | Facility: HOSPITAL | Age: 74
End: 2024-02-16
Payer: MEDICARE

## 2024-02-18 ENCOUNTER — PATIENT MESSAGE (OUTPATIENT)
Dept: SPORTS MEDICINE | Facility: CLINIC | Age: 74
End: 2024-02-18
Payer: MEDICARE

## 2024-02-19 ENCOUNTER — PATIENT MESSAGE (OUTPATIENT)
Dept: PRIMARY CARE CLINIC | Facility: CLINIC | Age: 74
End: 2024-02-19
Payer: MEDICARE

## 2024-02-19 ENCOUNTER — PATIENT MESSAGE (OUTPATIENT)
Dept: TRANSPLANT | Facility: CLINIC | Age: 74
End: 2024-02-19
Payer: MEDICARE

## 2024-02-19 DIAGNOSIS — K21.9 GASTROESOPHAGEAL REFLUX DISEASE WITHOUT ESOPHAGITIS: ICD-10-CM

## 2024-02-19 RX ORDER — PANTOPRAZOLE SODIUM 40 MG/1
40 TABLET, DELAYED RELEASE ORAL DAILY
Qty: 90 TABLET | Refills: 0 | Status: ON HOLD | OUTPATIENT
Start: 2024-02-19 | End: 2024-06-03

## 2024-02-19 NOTE — TELEPHONE ENCOUNTER
Refill Encounter    PCP Visits: Recent Visits  Date Type Provider Dept   11/27/23 Office Visit Effie Olmedo MD [facility]   03/28/23 Office Visit Effie Olmedo MD [facility]   03/14/23 Office Visit Effie Olmedo MD [facility]   Showing recent visits within past 360 days and meeting all other requirements  Future Appointments  No visits were found meeting these conditions.  Showing future appointments within next 720 days and meeting all other requirements     Last 3 Blood Pressure:   BP Readings from Last 3 Encounters:   01/22/24 (!) 145/75   12/29/23 (!) 152/88   12/19/23 (!) 159/79     Preferred Pharmacy:   [facility]/pharmacy #[id number] - [place], [place] - [address]  [address] [address]  Phone: [phone] Fax: [phone]    [facility] - [place], [place] - [address]  [address] [address]  Phone: [phone] Fax: [phone]    Requested RX:  Requested Prescriptions     Pending Prescriptions Disp Refills    pantoprazole (PROTONIX) 40 MG tablet 90 tablet 0     Sig: Take 1 tablet (40 mg total) by mouth once daily.      RX Route: Normal

## 2024-02-19 NOTE — TELEPHONE ENCOUNTER
Care Due:                  Date            Visit Type   Department     Provider  --------------------------------------------------------------------------------                                ESTABLISHED                              PATIENT -    [facility] PR[facility]  Last Visit: 11-      VIRTUAL      CARE           Effie Olmedo  Next Visit: None Scheduled  None         None Found                                                            Last  Test          Frequency    Reason                     Performed    Due Date  --------------------------------------------------------------------------------    Lipid Panel.  12 months..  atorvastatin.............  04- 03-    [facility] Embedded Care Due Messages. Reference number: [id number].   2/19/2024 10:20:44 AM CST

## 2024-02-23 ENCOUNTER — PATIENT MESSAGE (OUTPATIENT)
Dept: TRANSPLANT | Facility: CLINIC | Age: 74
End: 2024-02-23
Payer: MEDICARE

## 2024-02-23 DIAGNOSIS — F41.9 SEVERE ANXIETY: Primary | Chronic | ICD-10-CM

## 2024-02-23 DIAGNOSIS — F43.10 PTSD (POST-TRAUMATIC STRESS DISORDER): ICD-10-CM

## 2024-02-23 RX ORDER — QUETIAPINE FUMARATE 50 MG/1
50 TABLET, FILM COATED ORAL 2 TIMES DAILY
Qty: 60 TABLET | Refills: 1 | Status: SHIPPED | OUTPATIENT
Start: 2024-02-23 | End: 2024-03-26

## 2024-02-23 NOTE — TELEPHONE ENCOUNTER
Patient sent a portal message requesting a refill prescription for quetiapine 50 mg, but with twice daily dosing, not once daily dosing as prescribed by another provider. Notified Dr. Roberts about the patient request. She stated she will prescribe quetiapine 50 mg by mouth twice a day, with one refill, to last until the patient's initial psychiatry appointment in April. Updated the patient about Dr. Roberts's plan via portal message.

## 2024-02-26 ENCOUNTER — TELEPHONE (OUTPATIENT)
Dept: RHEUMATOLOGY | Facility: CLINIC | Age: 74
End: 2024-02-26
Payer: MEDICARE

## 2024-02-26 ENCOUNTER — TELEPHONE (OUTPATIENT)
Dept: SPORTS MEDICINE | Facility: CLINIC | Age: 74
End: 2024-02-26
Payer: MEDICARE

## 2024-02-26 ENCOUNTER — PATIENT MESSAGE (OUTPATIENT)
Dept: PRIMARY CARE CLINIC | Facility: CLINIC | Age: 74
End: 2024-02-26
Payer: MEDICARE

## 2024-02-26 DIAGNOSIS — I10 ESSENTIAL HYPERTENSION: Primary | ICD-10-CM

## 2024-02-26 NOTE — TELEPHONE ENCOUNTER
Called patient and left VM with call back number to see if patient was interested in establishing care with Dr. Salazar.

## 2024-02-26 NOTE — TELEPHONE ENCOUNTER
Called and spoke with patient to see if she wanted to come in this Wednesday 02/28/24 at 1:30 due to [facility] schedule is full for the rest of the day today.    ----- Message from Delano Nicholas sent at 2/26/2024 11:02 AM CST -----      Name of Who is Calling: MARY SLATER [[id number]]      What is the request in detail: Pt called to speak with the office said they are delayed for shot in shoulder they are at [facility].Please contact to further discuss and advise.          Can the clinic reply by [facility]: Y      What Number [facility]:  [phone]

## 2024-02-27 ENCOUNTER — OFFICE VISIT (OUTPATIENT)
Dept: INTERNAL MEDICINE | Facility: CLINIC | Age: 74
End: 2024-02-27
Payer: MEDICARE

## 2024-02-27 VITALS
BODY MASS INDEX: 19.84 KG/M2 | HEART RATE: 97 BPM | DIASTOLIC BLOOD PRESSURE: 71 MMHG | SYSTOLIC BLOOD PRESSURE: 121 MMHG | WEIGHT: 112 LBS | OXYGEN SATURATION: 98 %

## 2024-02-27 DIAGNOSIS — I10 ESSENTIAL HYPERTENSION: ICD-10-CM

## 2024-02-27 DIAGNOSIS — F41.9 SEVERE ANXIETY: Primary | Chronic | ICD-10-CM

## 2024-02-27 DIAGNOSIS — K21.9 GASTROESOPHAGEAL REFLUX DISEASE WITHOUT ESOPHAGITIS: Chronic | ICD-10-CM

## 2024-02-27 DIAGNOSIS — I25.10 CORONARY ARTERY DISEASE INVOLVING NATIVE CORONARY ARTERY OF NATIVE HEART WITHOUT ANGINA PECTORIS: ICD-10-CM

## 2024-02-27 DIAGNOSIS — J30.9 ALLERGIC RHINITIS, UNSPECIFIED SEASONALITY, UNSPECIFIED TRIGGER: ICD-10-CM

## 2024-02-27 DIAGNOSIS — J43.2 CENTRILOBULAR EMPHYSEMA: Chronic | ICD-10-CM

## 2024-02-27 DIAGNOSIS — M10.9 GOUT, UNSPECIFIED CAUSE, UNSPECIFIED CHRONICITY, UNSPECIFIED SITE: Chronic | ICD-10-CM

## 2024-02-27 DIAGNOSIS — I70.0 AORTIC ATHEROSCLEROSIS: ICD-10-CM

## 2024-02-27 PROCEDURE — 99999 PR PBB SHADOW E&M-EST. PATIENT-LVL III: CPT | Mod: PBBFAC,,, | Performed by: STUDENT IN AN ORGANIZED HEALTH CARE EDUCATION/TRAINING PROGRAM

## 2024-02-27 PROCEDURE — 99214 OFFICE O/P EST MOD 30 MIN: CPT | Mod: S$GLB,,, | Performed by: STUDENT IN AN ORGANIZED HEALTH CARE EDUCATION/TRAINING PROGRAM

## 2024-02-27 RX ORDER — AMLODIPINE BESYLATE 5 MG/1
5 TABLET ORAL DAILY
Qty: 90 TABLET | Refills: 1 | Status: SHIPPED | OUTPATIENT
Start: 2024-02-27 | End: 2024-02-27 | Stop reason: SDUPTHER

## 2024-02-27 RX ORDER — AMLODIPINE BESYLATE 5 MG/1
5 TABLET ORAL DAILY
Qty: 90 TABLET | Refills: 3 | Status: SHIPPED | OUTPATIENT
Start: 2024-02-27

## 2024-02-27 NOTE — TELEPHONE ENCOUNTER
Care Due:                  Date            Visit Type   Department     Provider  --------------------------------------------------------------------------------                                ESTABLISHED                              PATIENT -    [facility] PRIMARY  Last Visit: 11-      VIRTUAL      CARE           Effie Olmedo  Next Visit: None Scheduled  None         None Found                                                            Last  Test          Frequency    Reason                     Performed    Due Date  --------------------------------------------------------------------------------    Uric Acid...  12 months..  colchicine...............  05- 05-    [facility] Embedded Care Due Messages. Reference number: [id number].   2/27/2024 8:42:31 AM CST

## 2024-02-27 NOTE — TELEPHONE ENCOUNTER
Refill Encounter    PCP Visits: Recent Visits  Date Type Provider Dept   11/27/23 Office Visit Effie Olmedo MD [facility]   03/28/23 Office Visit Effie Olmedo MD [facility]   03/14/23 Office Visit Effie Olmedo MD [facility]   Showing recent visits within past 360 days and meeting all other requirements  Future Appointments  No visits were found meeting these conditions.  Showing future appointments within next 720 days and meeting all other requirements     Last 3 Blood Pressure:   BP Readings from Last 3 Encounters:   01/22/24 (!) 145/75   12/29/23 (!) 152/88   12/19/23 (!) 159/79     Preferred Pharmacy:   [facility]/pharmacy #[id number] [facility] [address]  [address] [address]  Phone: [phone] Fax: [phone]    Requested RX:  Requested Prescriptions     Pending Prescriptions Disp Refills    amLODIPine (NORVASC) 5 MG tablet 90 tablet 1     Sig: Take 1 tablet (5 mg total) by mouth.      RX Route: Normal

## 2024-02-27 NOTE — PROGRESS NOTES
Subjective:       Patient ID: Jessica Floyd is a 73 y.o. female.    Chief Complaint: Follow-up    HPI  Here for f/u.     She recently had epistaxis and went to UC 1 day ago, has a packing in her right nares. She is scheduled for f/u with the ENT UC for packing removal tomorrow.     HTN-current medication(s) include norvasc 5mg, HCTZ 25mg daily, coreg 6.25mg daily. Denies HA, vision changes, CP, SOB.     Aortic atherosclerosis, CAD/HLD-Currently on lipitor 80mg daily. The 10-year ASCVD risk score (Joyce GONZALEZ, et al., 2019) is: 16.8%    Values used to calculate the score:      Age: 73 years      Sex: Female      Is Non- : No      Diabetic: No      Tobacco smoker: No      Systolic Blood Pressure: 121 mmHg      Is BP treated: Yes      HDL Cholesterol: 69 mg/dL      Total Cholesterol: 176 mg/dL . Issues with medication none.    Anxiety- she takes seroquel 50mg BID. She will establish with psych for f/u. Seroquel is helping with controlling her anxiety symptoms.     Emphysema-she is seeing pulm. She is on Breo and spiriva for this. Currently denies SOB, dyspnea. She has home oxygen, which she uses prn.     Allergic rhinits-she takes singulair and flonase daily for this.     Hx of ERIK-previously receiving iron infusions. She is seeing heme/onc.     GERD-mainly asymptomatic at home. Has known food triggers, takes protonix prn. Denies dysphagia, odynophagia, changes in BM, stools.     Hx of Gout-takes allopurinol and colchicine daily. She has not had issues with this.  All of your core healthy metrics are met.      Social History     Social History Narrative    .  Has 3 grown daughters.         Family History   Problem Relation Age of Onset    Hypertension Mother     Aneurysm Mother     Hypertension Father     Alcohol abuse Father     Kidney disease Brother     Heart disease Brother     Gout Brother     No Known Problems Daughter     No Known Problems Daughter     No Known Problems Daughter         Current Outpatient Medications:     acetaminophen (TYLENOL) 650 MG TbSR, Take 1 tablet (650 mg total) by mouth every 6 to 8 hours as needed (pain (mild-moderate))., Disp: 120 tablet, Rfl: 0    albuterol-ipratropium (DUO-NEB) 2.5 mg-0.5 mg/3 mL nebulizer solution, Take 3 mLs by nebulization every 6 (six) hours as needed for Wheezing. Rescue, Disp: 90 mL, Rfl: 0    allopurinoL (ZYLOPRIM) 100 MG tablet, Take 2 tablets (200 mg total) by mouth once daily., Disp: 180 tablet, Rfl: 3    aluminum & magnesium hydroxide-simethicone (MYLANTA MAX STRENGTH) 400-400-40 mg/5 mL suspension, Take 30 mLs by mouth every 6 (six) hours as needed., Disp: 300 mL, Rfl: 0    amLODIPine (NORVASC) 5 MG tablet, Take 1 tablet (5 mg total) by mouth once daily., Disp: 90 tablet, Rfl: 1    aspirin (ECOTRIN) 81 MG EC tablet, Take 81 mg by mouth once daily., Disp: , Rfl:     atorvastatin (LIPITOR) 80 MG tablet, TAKE 1 TABLET BY MOUTH EVERY DAY, Disp: 90 tablet, Rfl: 3    bacitracin 500 unit/gram ointment, Apply topically 3 (three) times daily., Disp: , Rfl: 0    carvediloL (COREG) 6.25 MG tablet, Take 1 tablet (6.25 mg total) by mouth 2 (two) times daily with meals., Disp: 60 tablet, Rfl: 11    celecoxib (CELEBREX) 200 MG capsule, Take 1 capsule (200 mg total) by mouth as needed., Disp: , Rfl:     colchicine (COLCRYS) 0.6 mg tablet, Take 1 tablet (0.6 mg total) by mouth once daily. As needed for gout, Disp: 30 tablet, Rfl: 11    COMIRNATY 2023-24, 12Y UP,,PF, 30 mcg/0.3 mL inection, , Disp: , Rfl:     FLUAD QUAD 2023-24,65Y UP,,PF, 60 mcg (15 mcg x 4)/0.5 mL Syrg, , Disp: , Rfl:     fluticasone furoate-vilanteroL (BREO) 100-25 mcg/dose diskus inhaler, Inhale 1 puff into the lungs once daily. Controller, Disp: 30 each, Rfl: 11    fluticasone propionate (FLONASE) 50 mcg/actuation nasal spray, SPRAY 2 pumps INTO EACH NOSTRIL ONCE DAILY, Disp: 16 mL, Rfl: 3    folic acid (FOLVITE) 1 MG tablet, Take 1 tablet (1 mg total) by mouth once daily., Disp:  90 tablet, Rfl: 3    furosemide (LASIX) 20 MG tablet, Take 1 tablet (20 mg total) by mouth daily as needed (Leg swelling, SOB, Weight gain 3lb in 1 day or 5 lbs in 2 days.)., Disp: 30 tablet, Rfl: 11    hydroCHLOROthiazide (HYDRODIURIL) 25 MG tablet, Take 1 tablet (25 mg total) by mouth once daily., Disp: 90 tablet, Rfl: 3    melatonin (MELATIN) 3 mg tablet, Take 2 tablets (6 mg total) by mouth nightly as needed for Insomnia., Disp: , Rfl: 0    metoclopramide HCl (REGLAN) 10 MG tablet, Take 1 tablet (10 mg total) by mouth before meals as needed (Abdominal Pain)., Disp: 20 tablet, Rfl: 0    montelukast (SINGULAIR) 10 mg tablet, Take 1 tablet (10 mg total) by mouth every evening., Disp: 90 tablet, Rfl: 11    multivitamin (THERAGRAN) tablet, Take 1 tablet by mouth once daily., Disp: 90 tablet, Rfl: 3    ORTHOVISC 30 mg/2 mL, , Disp: , Rfl:     pantoprazole (PROTONIX) 40 MG tablet, Take 1 tablet (40 mg total) by mouth once daily., Disp: 90 tablet, Rfl: 0    prenatal no122/iron/folic acid (PRENATAL MULTI ORAL), Take 1 tablet by mouth once daily., Disp: , Rfl:     QUEtiapine (SEROQUEL) 50 MG tablet, Take 1 tablet (50 mg total) by mouth 2 (two) times daily., Disp: 60 tablet, Rfl: 1    tiotropium bromide (SPIRIVA RESPIMAT) 2.5 mcg/actuation inhaler, Inhale 2 puffs into the lungs once daily. Controller, Disp: 4 g, Rfl: 2    VENOFER 100 mg iron/5 mL injection, , Disp: , Rfl:     calcium carbonate (TUMS) 200 mg calcium (500 mg) chewable tablet, Take 2 tablets (1,000 mg total) by mouth 2 (two) times daily as needed for Heartburn., Disp: , Rfl:   No current facility-administered medications for this visit.    Facility-Administered Medications Ordered in Other Visits:     mupirocin 2 % ointment, , Nasal, On Call Procedure, Kang Diaz MD, Given at 10/25/23 1045    tranexamic acid (CYKLOKAPRON) 1,000 mg in sodium chloride 0.9 % 100 mL IVPB (MB+), 1,000 mg, Intravenous, Once, Kang Diaz MD    tranexamic acid  (CYKLOKAPRON) 1,000 mg in sodium chloride 0.9 % 100 mL IVPB (MB+), 1,000 mg, Intravenous, Once, Kang Diaz MD    Review of Systems   Constitutional:  Negative for chills and fever.   Respiratory:  Negative for cough and shortness of breath.    Cardiovascular:  Negative for chest pain and palpitations.   Gastrointestinal:  Negative for nausea and vomiting.   Musculoskeletal:  Negative for gait problem.   Neurological:  Negative for weakness.   Psychiatric/Behavioral:  Negative for behavioral problems.        Objective:   /71 (BP Location: Left arm, Patient Position: Sitting)   Pulse 97   Wt 50.8 kg (111 lb 15.9 oz)   SpO2 98%   BMI 19.84 kg/m²      Physical Exam  Vitals and nursing note reviewed.   Constitutional:       General: She is not in acute distress.     Appearance: Normal appearance. She is not ill-appearing, toxic-appearing or diaphoretic.   Eyes:      General:         Right eye: No discharge.         Left eye: No discharge.      Conjunctiva/sclera: Conjunctivae normal.   Cardiovascular:      Rate and Rhythm: Normal rate and regular rhythm.   Pulmonary:      Effort: Pulmonary effort is normal. No respiratory distress.      Breath sounds: Normal breath sounds. No wheezing.   Neurological:      Mental Status: She is alert.   Psychiatric:         Behavior: Behavior normal.         Assessment & Plan   1. Essential hypertension  -BP stable in clinic. Continue current med.  - amLODIPine (NORVASC) 5 MG tablet; Take 1 tablet (5 mg total) by mouth once daily.  Dispense: 90 tablet; Refill: 3    2. Severe anxiety  -mental health referrals list provided for establishing with therapy. F/u with scheduled psych for med management.  - Ambulatory referral/consult to Psychology; Future    3. Aortic atherosclerosis  4. Coronary artery disease involving native coronary artery of native heart without angina pectoris  -on ASA and stating.    5. Allergic rhinitis, unspecified seasonality, unspecified  trigger  -controlled with current meds.     6. Centrilobular emphysema  -controlled with current inhalers.    7. Gastroesophageal reflux disease without esophagitis  -avoids known food triggers. Stable without meds.    8. Gout, unspecified cause, unspecified chronicity, unspecified site  -controlled. No recent symptoms.    No follow-ups on file.    Disclaimer:  This note may have been prepared using voice recognition software, it may have not been extensively proofed, as such there could be errors within the text such as sound alike errors.

## 2024-02-28 ENCOUNTER — PATIENT MESSAGE (OUTPATIENT)
Dept: INTERNAL MEDICINE | Facility: CLINIC | Age: 74
End: 2024-02-28
Payer: MEDICARE

## 2024-02-28 ENCOUNTER — OFFICE VISIT (OUTPATIENT)
Dept: SPORTS MEDICINE | Facility: CLINIC | Age: 74
End: 2024-02-28
Payer: MEDICARE

## 2024-02-28 VITALS
SYSTOLIC BLOOD PRESSURE: 113 MMHG | DIASTOLIC BLOOD PRESSURE: 67 MMHG | WEIGHT: 113 LBS | BODY MASS INDEX: 20.02 KG/M2 | HEART RATE: 106 BPM | HEIGHT: 63 IN

## 2024-02-28 DIAGNOSIS — R06.02 SOB (SHORTNESS OF BREATH) ON EXERTION: ICD-10-CM

## 2024-02-28 DIAGNOSIS — M25.511 ACUTE PAIN OF RIGHT SHOULDER: Primary | ICD-10-CM

## 2024-02-28 DIAGNOSIS — S46.011A TRAUMATIC COMPLETE TEAR OF RIGHT ROTATOR CUFF, INITIAL ENCOUNTER: Primary | ICD-10-CM

## 2024-02-28 PROCEDURE — 99999 PR PBB SHADOW E&M-EST. PATIENT-LVL IV: CPT | Mod: PBBFAC,TXP,, | Performed by: PHYSICIAN ASSISTANT

## 2024-02-28 PROCEDURE — 76882 US LMTD JT/FCL EVL NVASC XTR: CPT | Mod: S$GLB,TXP,, | Performed by: PHYSICIAN ASSISTANT

## 2024-02-28 PROCEDURE — 99214 OFFICE O/P EST MOD 30 MIN: CPT | Mod: 25,S$GLB,TXP, | Performed by: PHYSICIAN ASSISTANT

## 2024-02-28 PROCEDURE — 20611 DRAIN/INJ JOINT/BURSA W/US: CPT | Mod: S$GLB,TXP,, | Performed by: PHYSICIAN ASSISTANT

## 2024-02-28 RX ORDER — BUPIVACAINE HYDROCHLORIDE 2.5 MG/ML
2 INJECTION, SOLUTION INFILTRATION; PERINEURAL
Status: DISCONTINUED | OUTPATIENT
Start: 2024-02-28 | End: 2024-02-28 | Stop reason: HOSPADM

## 2024-02-28 RX ORDER — METHYLPREDNISOLONE ACETATE 40 MG/ML
40 INJECTION, SUSPENSION INTRA-ARTICULAR; INTRALESIONAL; INTRAMUSCULAR; SOFT TISSUE
Status: DISCONTINUED | OUTPATIENT
Start: 2024-02-28 | End: 2024-02-28 | Stop reason: HOSPADM

## 2024-02-28 RX ADMIN — METHYLPREDNISOLONE ACETATE 40 MG: 40 INJECTION, SUSPENSION INTRA-ARTICULAR; INTRALESIONAL; INTRAMUSCULAR; SOFT TISSUE at 01:02

## 2024-02-28 RX ADMIN — BUPIVACAINE HYDROCHLORIDE 2 ML: 2.5 INJECTION, SOLUTION INFILTRATION; PERINEURAL at 01:02

## 2024-02-28 NOTE — PROGRESS NOTES
ESTABLISHED PATIENT    History 2/28/2024:  Jessica returns here today for follow-up evaluation of her right shoulder.  She has rotator cuff tear arthropathy and has been treating her symptoms subjectively.  She was last given a subacromial injection in November which gave her temporary relief.  She complains today of having recurrent pain and is requesting a repeat injection.    History 11/29/2023:  Jessica returns to clinic today for follow-up of right shoulder pain.  She wants to discuss options for her right shoulder.  She has had a few significant events occurred recently.  She was initially booked for right reverse total shoulder arthroplasty but secondary to anxiety did not want to proceed with the surgery.  She subsequently had a fall and sustained a left hip fracture which required a left total hip arthroplasty.  Her primary complaint is still pain in the shoulder but she still has good active range of motion of the right shoulder.  She wants to discuss other minimally invasive options other than a reverse total shoulder replacement.  She wants to discuss a balloon procedure as an option.    History 7/24/2023:  Jessica returns here today for follow-up evaluation of her right shoulder.  She has been attending physical therapy but continues to have constant right shoulder pain with limited range of motion and weakness.  Of note, she is still recovering from a recent total hip arthroplasty due to femoral neck fracture.    History 5/1/2023:   73 y.o. Female with a history of right shoulder pain who is referred today by Armani Cai PA-C. She is retired and she states she has been in physical therapy for a while for her knee and her shoulder. That has taken up most of her time. She has had a fall. She also had a incident a few months ago where she lifted herself up from a low toilet and she felt a pop in the shoulder. Armani gave her a CSI and she had some relief.         History 4/10/2023:  Jessica returns here  today for follow-up evaluation of right shoulder.  She has been undergoing physical therapy for her large rotator cuff tear and glenohumeral joint osteoarthritis.  There has been improvement in her pain and function since her last visit.  She is still having difficulty performing simple ADLs such as cooking and trouble lifting objects overhead, but overall she seems to be very happy with her progress.    History 02/28/2023:  Jessica returns here today for follow-up of her right shoulder.  She has a large rotator cuff tear and underlying osteoarthritis.  She has previously failed a subacromial corticosteroid injection and we are attempting to manage her symptoms conservatively.  She was only able to attend 1 visit with physical therapy due to having uncontrollable epistaxis and having to go to the emergency room and ENT several times over the last 2 weeks.  She complains of having significant right shoulder pain and stiffness.    History 02/06/2023:  Jessica returns here today for follow-up evaluation of her right shoulder.  She was given a subacromial corticosteroid injection at her last visit.  While attempting to lift herself up from a restroom toilet, she felt a pop in her shoulder and had immediate discomfort.  She was seen in the emergency room for this.  A CT arthrogram was ordered for further evaluation of her rotator cuff.  She is here today to discuss the results.  She has been using a shoulder sling intermittently for pain relief.    History 01/23/2023:  Jessica returns here today for follow-up evaluation of her right shoulder.  She states that the Medrol Dosepak gave her very minimal relief.  She has had worsening pain and weakness since her last visit.  She is having a lot of difficulty getting dressed and sleeping at night.  She has been taking Tylenol p.m. at night which has helped her get some sleep.    Of note, she states that her knee pain has resolved following the Orthovisc series.    History  "01/10/2023:  72 y.o. Female with a history of right shoulder pain who began having pain 1 week ago.  She denies having any precipitating injury or trauma but states that she was moving a lot of boxes which may have irritated her shoulder.  She is having a lot of discomfort with movement and at night while sleeping.  She has had difficulty getting dressed due to the pain and is frequently dropping objects due to weakness.        Is affecting ADLs.  Pain is 7/10 at it's worst.      REVIEW OF SYSTEMS   Constitution: Negative. Negative for chills, fever and night sweats.   HENT: Negative for congestion and headaches.    Eyes: Negative for blurred vision, left vision loss and right vision loss.   Cardiovascular: Negative for chest pain and syncope.   Respiratory: Negative for cough and shortness of breath.    Endocrine: Negative for polydipsia, polyphagia and polyuria.   Hematologic/Lymphatic: Negative for bleeding problem. Does not bruise/bleed easily.   Skin: Negative for dry skin, itching and rash.   Musculoskeletal: Negative for falls. Positive for right shoulder pain  Gastrointestinal: Negative for abdominal pain and bowel incontinence.   Genitourinary: Negative for bladder incontinence and nocturia.   Neurological: Negative for disturbances in coordination, loss of balance and seizures.   Psychiatric/Behavioral: Negative for depression. The patient does not have insomnia.    Allergic/Immunologic: Negative for hives and persistent infections.     PHYSICAL EXAMINATION    Vitals: /67   Pulse 106   Ht 5' 3" (1.6 m)   Wt 51.2 kg (112 lb 15.8 oz)   BMI 20.01 kg/m²     General: The patient appears active and healthy with no apparent physical problems.  No disturbance of mood or affect is demonstrated. Alert and Oriented.    HEENT: Eyes normal, pupils equally round, nose normal.    Resp: Equal and symmetrical chest rises. No wheezing    CV: Regular rate    Neck: Supple; nonpainful range of motion.    Extremities: " no cyanosis, clubbing, edema, or diffuse swelling.  Palpable pulses, good capillary refill of the digits.  No coolness, discoloration, edema or obvious varicosities.    Skin: no lesions noted.    Lymphatic: no detected adenopathy in the upper or lower extremities.    Neurologic: normal mental status, normal reflexes, normal gait and balance.  Patient is alert and oriented to person, place and time.  No flaccidity or spasticity is noted.  No motor or sensory deficits are noted.  Light touch is intact    Orthopaedic:     SHOULDER EXAM - RIGHT    Inspection:   Normal skin color and appearance with no scars.  No muscle atrophy noted.  No scapular winging.      Palpation: No tenderness of the acromioclavicular joint, lateral edge of the acromion, biceps tendon, trapezius muscle or scapulothoracic bursa.  Crepitus of the glenohumeral joint    ROM:      PROM:     FE - 150°    Abd/ER -  80°  Abd/IR -  30°   Add/ER -  40°     AROM:    FE - 150°  with pain beyond 90°.  Abd/ER -  80°  Abd/IR -  30°   Add/ER -  40°  *pain resisted ER       Tests:     - [place], - Neer's, - Cross Arm Adduction, - [place], - [place], - Speed. - Belly Press,  + Jobes, - Lift Off    Stability: - sulcus, - apprehension, - relocation, - load and shift, - DLS      Motor:  Rotator cuff strength is 4/5 supraspinatus, 4/5 infraspinatus, 5/5 subscapularis. Biceps, triceps and deltoid strength is 5/5.      Neuro     Distally there are no paresthesias, and sensation is intact to light touch in the median, ulnar, and radial distributions.  Reflexes are 2/2 biceps, triceps and brachioradialis.        IMAGING    CT Arthrogram Shoulder Right  Order: [id number]  Status: Final result     Visible to patient: Yes (seen)     Next appt: 07/26/2023 at 11:00 AM in Radiology ([facility], [place] DEXA1)     Dx: Acute pain of right shoulder     0 Result Notes  Details    Reading Physician Reading Date Result Priority   Musa Maloney MD  [phone] 2/3/2023 Routine     Narrative &  Impression  EXAMINATION:  CT ARTHROGRAM SHOULDER RIGHT     CLINICAL HISTORY:  Shoulder pain, rotator cuff disorder suspected, xray done;  Pain in right shoulder     TECHNIQUE:  Routine right multiplanar CT arthrogram shoulder performed after the intra-articular injection of contrast.     COMPARISON:  None     FINDINGS:  Rotator cuff: There is a large full-thickness rotator cuff tear involving the entire supra and infraspinatus tendons.  There is tendon retraction to the glenoid rim.  The teres minor and subscapularis are intact.  There is mild volume loss of both supra and infraspinatus muscles.  High-riding humeral head noted abutting the acromion.     Mild AC joint arthropathy.     The biceps tendon not identified, probably torn.     Generalized cartilage thinning/joint space narrowing noted with mild osteophytosis along the inferior margin of the humeral head.  No fractures or marrow replacement process.  No evidence of osteomyelitis.  No axillary lymphadenopathy.  Emphysematous changes noted in the right lung apex.     Impression:     Large full-thickness rotator cuff tear, involving the infraspinatus and supraspinatus tendons, as above.     This report was flagged in Epic as abnormal.        Electronically signed by: Musa Maloney MD  Date:                                            02/03/2023  Time:                                           13:22           Exam Ended: 02/03/23 12:33 Last Resulted: 02/03/23 13:22           X-Ray Shoulder Complete 2 View Right  Order: [id number]  Status: Final result     Visible to patient: Yes (seen)     Next appt: 07/26/2023 at 11:00 AM in Radiology ([facility] DEXA1)     Dx: Right shoulder pain     0 Result Notes  Details    Reading Physician Reading Date Result Priority   Myrna Hameed MD  [phone] 1/27/2023 STAT     Narrative & Impression  EXAMINATION:  XR SHOULDER COMPLETE 2 OR MORE VIEWS RIGHT     CLINICAL HISTORY:  Pain in right shoulder     TECHNIQUE:  Two views of the  right shoulder were performed.     COMPARISON:  01/10/2023.     FINDINGS:  No evidence of acute displaced fracture, dislocation, or osseous destructive process.  Suspected remote healed fracture deformity is seen is seen of the right humeral head and neck.  Mild degenerative changes are seen at the glenohumeral and acromioclavicular joints.     Impression:     No acute osseous abnormality identified.        Electronically signed by: Myrna Hameed MD  Date:                                            01/27/2023  Time:                                           17:41           Exam Ended: 01/27/23 17:33 Last Resulted: 01/27/23 17:41               IMPRESSION       ICD-10-CM ICD-9-CM   1. Traumatic complete tear of right rotator cuff, initial encounter  S46.011A 840.4         MEDICATIONS PRESCRIBED      None    RECOMMENDATIONS     Activity modifications given to the patient today  Repeat CSI of right sub-acromial bursa performed under ultrasound guidance  RTC in 3 months if needed  I advised the patient that based on her current medical comorbidities that surgery right now may not be the best option.  She had relief with a subacromial injection.  I advised her that she can continue to do this for quite some time.  I also advised her she is not a candidate for a balloon procedure.  The only definitive surgery that would help improve her pain would be a reverse total shoulder arthroplasty.  However, I would reserve this procedure until she is medically optimized and subacromial injections have failed.      PROCEDURE:  DIAGNOSTIC ULTRASOUND performed on 02/28/2024  FOCUSED:  1. Diagnostic Extremity - [facility]-Sports Ultrasound was recommended due to right shoulder pain.  2. Diagnostic Extremity - [facility]-Sports Ultrasound Performed: Armani Cai Extremity Study: right shoulder.    TECHNIQUE: Real time ultrasound examination of the right shoulder was performed with SonEnChromate Edge 2, 9-L MHz linear probe(s).     FINDINGS: The images  are of adequate diagnostic quality with identification of all echogenic structures made except for the vascular structures unless otherwise noted. There is no sonographic evidence of periosteal abnormalities, soft tissue edema, or nerve irregularities.  There is evidence of a full-thickness rotator cuff tear with retraction and superior migration of the humeral head in relation to the glenoid resulting in decreased subacromial space. The rest of the sonographic examination was unremarkable.  Ultrasound images were directly reviewed with the patient and then a treatment plan was discussed.      Sports Medicine US - Guidance for Needle Placement    Date/Time: 2/28/2024 1:30 PM    Performed by: Armani Cai PA-C  Authorized by: Armani Cai PA-C  Preparation: Patient was prepped and draped in the usual sterile fashion.  Local anesthesia used: yes    Anesthesia:  Local anesthesia used: yes  Local Anesthetic: co-phenylcaine spray    Sedation:  Patient sedated: no    Patient tolerance: patient tolerated the procedure well with no immediate complications      Large Joint Aspiration/Injection: R subacromial bursa    Date/Time: 2/28/2024 1:30 PM    Performed by: Armani Cai PA-C  Authorized by: Armani Cai PA-C    Consent Done?:  Yes (Verbal)  Indications:  Pain  Site marked: the procedure site was marked    Timeout: prior to procedure the correct patient, procedure, and site was verified    Prep: patient was prepped and draped in usual sterile fashion      Local anesthesia used?: Yes    Local anesthetic:  Co-phenylcaine spray    Details:  Needle Size:  22 G  Ultrasonic Guidance for needle placement?: Yes (Ultrasound guidance was used for needle localization.  Images were saved and stored for documentation.  Dynamic visualization of the needle was continuous throughout the procedure and maintained accurate placement)    Images are saved and documented.  Approach:  Lateral  Location:  Shoulder  Site:  R  subacromial bursa  Medications:  40 mg methylPREDNISolone acetate 40 mg/mL; 2 mL BUPivacaine 0.25% (2.5 mg/ml) 0.25 % (2.5 mg/mL)  Patient tolerance:  Patient tolerated the procedure well with no immediate complications      All questions were answered, pt will contact us for questions or concerns in the interim.

## 2024-02-28 NOTE — TELEPHONE ENCOUNTER
No care due was identified.  [facility] Embedded Care Due Messages. Reference number: [id number].   2/28/2024 4:49:16 PM CST

## 2024-02-29 ENCOUNTER — PATIENT MESSAGE (OUTPATIENT)
Dept: ADMINISTRATIVE | Facility: OTHER | Age: 74
End: 2024-02-29
Payer: MEDICARE

## 2024-02-29 RX ORDER — TIOTROPIUM BROMIDE INHALATION SPRAY 3.12 UG/1
2 SPRAY, METERED RESPIRATORY (INHALATION)
Qty: 4 G | Refills: 2 | Status: SHIPPED | OUTPATIENT
Start: 2024-02-29 | End: 2024-05-01

## 2024-02-29 RX ORDER — TIOTROPIUM BROMIDE INHALATION SPRAY 3.12 UG/1
2 SPRAY, METERED RESPIRATORY (INHALATION)
Qty: 4 G | Refills: 2 | OUTPATIENT
Start: 2024-02-29

## 2024-02-29 NOTE — TELEPHONE ENCOUNTER
Refill Encounter    PCP Visits: Recent Visits  Date Type Provider Dept   11/27/23 Office Visit Effie Olmedo MD [facility]   03/28/23 Office Visit Effie Olmedo MD [facility]   03/14/23 Office Visit Effie Olmedo MD [facility]   Showing recent visits within past 360 days and meeting all other requirements  Future Appointments  No visits were found meeting these conditions.  Showing future appointments within next 720 days and meeting all other requirements     Last 3 Blood Pressure:   BP Readings from Last 3 Encounters:   02/28/24 113/67   02/27/24 121/71   01/22/24 (!) 145/75     Preferred Pharmacy:   [facility]/pharmacy #[id number] [place], [place] - [address] [facility]  [address] [place] [id number]  Phone: [phone] Fax: [phone]      Requested RX:  Requested Prescriptions     Pending Prescriptions Disp Refills    SPIRIVA RESPIMAT 2.5 mcg/actuation inhaler [Pharmacy Med Name: SPIRIVA RESPIMAT 2.5 MCG INH]  2     Sig: INHALE 2 PUFFS INTO THE LUNGS ONCE DAILY. CONTROLLER      RX Route: Normal

## 2024-03-04 ENCOUNTER — OFFICE VISIT (OUTPATIENT)
Dept: GASTROENTEROLOGY | Facility: CLINIC | Age: 74
End: 2024-03-04
Payer: MEDICARE

## 2024-03-04 ENCOUNTER — DOCUMENT SCAN (OUTPATIENT)
Dept: HOME HEALTH SERVICES | Facility: HOSPITAL | Age: 74
End: 2024-03-04
Payer: MEDICARE

## 2024-03-04 VITALS — HEIGHT: 63 IN | BODY MASS INDEX: 20.71 KG/M2 | WEIGHT: 116.88 LBS

## 2024-03-04 DIAGNOSIS — R13.10 DYSPHAGIA, UNSPECIFIED TYPE: ICD-10-CM

## 2024-03-04 DIAGNOSIS — K21.9 GASTROESOPHAGEAL REFLUX DISEASE WITHOUT ESOPHAGITIS: Primary | Chronic | ICD-10-CM

## 2024-03-04 PROCEDURE — 99214 OFFICE O/P EST MOD 30 MIN: CPT | Mod: NTX,S$GLB,, | Performed by: NURSE PRACTITIONER

## 2024-03-04 PROCEDURE — 99999 PR PBB SHADOW E&M-EST. PATIENT-LVL III: CPT | Mod: PBBFAC,TXP,, | Performed by: NURSE PRACTITIONER

## 2024-03-04 NOTE — PROGRESS NOTES
GASTROENTEROLOGY CLINIC NOTE    Chief Complaint: The primary encounter diagnosis was Gastroesophageal reflux disease without esophagitis. A diagnosis of Dysphagia, unspecified type was also pertinent to this visit.  Referring provider/PCP: Effie Olmedo MD    Jessica Floyd is a 73 y.o. female who is a new patient to me who presents for reflux and epigastric pain. She is accompanied by her . Patient hospitalized 12/2023 for SOB, Flu A, and COPD exacerbation.   During hospitalization had episode of chest pain with feeling of food caught in throat. Patient and  state this only happens when she is hospitalized. Does not occur at home. She takes Protonix 40mg daily.    When this occurred in hospital, she was prescribed reglan PRN and given GI cocktail which helped symptoms considerably. She had no further episodes during hospitalization and has not had any since discharge.   She has no complaints at this time. Appetite has improved since discharge and weight is improving.     Reflux: No. Controlled with daily Protonix.  Dysphagia/Odynophagia: No  Nausea/Vomiting: No  Appetite Changes: No  Abdominal Pain: No  Bowel Habits: regular bowel movements. No change in bowel habits.  GI Bleeding: Denies hematochezia, hematemesis, melena, BRBPR, Black/tarry stool, coffee ground emesis  Unexplained Weight Loss: No       GLP-1s: No  NSAIDs: No  Anticoagulation or Antiplatelet: No    History of H.pylori:  H.pylori Treatment:  Prior Upper Endoscopy:   Prior Colonoscopy: unsure of date; no abnormal findings  Family h/o Colon Cancer: No  Family h/o Crohn's Disease or Ulcerative Colitis: No  Family h/o Celiac Sprue: No      Review of Systems   Constitutional:  Negative for weight loss.   HENT:  Negative for sore throat.    Eyes:  Negative for blurred vision.   Respiratory:  Negative for cough.    Cardiovascular:  Negative for chest pain.   Gastrointestinal:  Negative for abdominal pain, blood in stool, constipation,  diarrhea, heartburn, melena, nausea and vomiting.   Genitourinary:  Negative for dysuria.   Musculoskeletal:  Negative for myalgias.   Skin:  Negative for rash.   Neurological:  Negative for headaches.   Endo/Heme/Allergies:  Negative for environmental allergies.   Psychiatric/Behavioral:  Negative for suicidal ideas. The patient is not nervous/anxious.        Past Medical History: has a past medical history of Allergic rhinitis, Amblyopia, Carotid stenosis, Coronary atherosclerosis, Dyslipidemia, ESBL (extended spectrum beta-lactamase) producing bacteria infection, Hypertension, Nausea and vomiting, Pulmonary emphysema, SAH (subarachnoid hemorrhage), and Subarachnoid hemorrhage due to ruptured aneurysm.    Past Surgical History: has a past surgical history that includes Anterior cruciate ligament repair (2012); Dental surgery; Cerebral aneurysm repair (1999); Tonsillectomy; Adenoidectomy; Appendectomy; Cataract extraction w/  intraocular lens implant (Right, 11/07/2022); Cataract extraction w/  intraocular lens implant (Left, 11/21/2022); Eye surgery (Bilateral); and Hip Arthroplasty (Left, 05/28/2023).    Family History:family history includes Alcohol abuse in her father; Aneurysm in her mother; Gout in her brother; Heart disease in her brother; Hypertension in her father and mother; Kidney disease in her brother; No Known Problems in her daughter, daughter, and daughter.    Allergies: Review of patient's allergies indicates:  No Known Allergies    Social History: reports that she quit smoking about 25 years ago. Her smoking use included cigarettes. She started smoking about 45 years ago. She has a 10.0 pack-year smoking history. She has been exposed to tobacco smoke. She has never used smokeless tobacco. She reports current alcohol use of about 7.0 standard drinks of alcohol per week. She reports that she does not use drugs.    Home medications:   Current Outpatient Medications on File Prior to Visit   Medication  Sig Dispense Refill    acetaminophen (TYLENOL) 650 MG TbSR Take 1 tablet (650 mg total) by mouth every 6 to 8 hours as needed (pain (mild-moderate)). 120 tablet 0    albuterol-ipratropium (DUO-NEB) 2.5 mg-0.5 mg/3 mL nebulizer solution Take 3 mLs by nebulization every 6 (six) hours as needed for Wheezing. Rescue 90 mL 0    allopurinoL (ZYLOPRIM) 100 MG tablet Take 2 tablets (200 mg total) by mouth once daily. 180 tablet 3    aluminum & magnesium hydroxide-simethicone (MYLANTA MAX STRENGTH) 400-400-40 mg/5 mL suspension Take 30 mLs by mouth every 6 (six) hours as needed. 300 mL 0    amLODIPine (NORVASC) 5 MG tablet Take 1 tablet (5 mg total) by mouth once daily. 90 tablet 3    aspirin (ECOTRIN) 81 MG EC tablet Take 81 mg by mouth once daily.      atorvastatin (LIPITOR) 80 MG tablet TAKE 1 TABLET BY MOUTH EVERY DAY 90 tablet 3    bacitracin 500 unit/gram ointment Apply topically 3 (three) times daily.  0    carvediloL (COREG) 6.25 MG tablet Take 1 tablet (6.25 mg total) by mouth 2 (two) times daily with meals. 60 tablet 11    celecoxib (CELEBREX) 200 MG capsule Take 1 capsule (200 mg total) by mouth as needed.      colchicine (COLCRYS) 0.6 mg tablet Take 1 tablet (0.6 mg total) by mouth once daily. As needed for gout 30 tablet 11    COMIRNATY 2023-24, 12Y UP,,PF, 30 mcg/0.3 mL inection       FLUAD QUAD 2023-24,65Y UP,,PF, 60 mcg (15 mcg x 4)/0.5 mL Syrg       fluticasone furoate-vilanteroL (BREO) 100-25 mcg/dose diskus inhaler Inhale 1 puff into the lungs once daily. Controller 30 each 11    fluticasone propionate (FLONASE) 50 mcg/actuation nasal spray SPRAY 2 pumps INTO EACH NOSTRIL ONCE DAILY 16 mL 3    folic acid (FOLVITE) 1 MG tablet Take 1 tablet (1 mg total) by mouth once daily. 90 tablet 3    furosemide (LASIX) 20 MG tablet Take 1 tablet (20 mg total) by mouth daily as needed (Leg swelling, SOB, Weight gain 3lb in 1 day or 5 lbs in 2 days.). 30 tablet 11    hydroCHLOROthiazide (HYDRODIURIL) 25 MG tablet Take 1  "tablet (25 mg total) by mouth once daily. 90 tablet 3    melatonin (MELATIN) 3 mg tablet Take 2 tablets (6 mg total) by mouth nightly as needed for Insomnia.  0    montelukast (SINGULAIR) 10 mg tablet Take 1 tablet (10 mg total) by mouth every evening. 90 tablet 11    multivitamin (THERAGRAN) tablet Take 1 tablet by mouth once daily. 90 tablet 3    ORTHOVISC 30 mg/2 mL       pantoprazole (PROTONIX) 40 MG tablet Take 1 tablet (40 mg total) by mouth once daily. 90 tablet 0    prenatal no122/iron/folic acid (PRENATAL MULTI ORAL) Take 1 tablet by mouth once daily.      QUEtiapine (SEROQUEL) 50 MG tablet Take 1 tablet (50 mg total) by mouth 2 (two) times daily. 60 tablet 1    tiotropium bromide (SPIRIVA RESPIMAT) 2.5 mcg/actuation inhaler INHALE 2 PUFFS INTO THE LUNGS ONCE DAILY. CONTROLLER 4 g 2    VENOFER 100 mg iron/5 mL injection       calcium carbonate (TUMS) 200 mg calcium (500 mg) chewable tablet Take 2 tablets (1,000 mg total) by mouth 2 (two) times daily as needed for Heartburn.       Current Facility-Administered Medications on File Prior to Visit   Medication Dose Route Frequency Provider Last Rate Last Admin    mupirocin 2 % ointment   Nasal On Call Procedure Kang Diaz MD   Given at 10/25/23 1045    tranexamic acid (CYKLOKAPRON) 1,000 mg in sodium chloride 0.9 % 100 mL IVPB (MB+)  1,000 mg Intravenous Once Kang Diaz MD        tranexamic acid (CYKLOKAPRON) 1,000 mg in sodium chloride 0.9 % 100 mL IVPB (MB+)  1,000 mg Intravenous Once Kang Diaz MD           Vital signs:  Ht 5' 3" (1.6 m)   Wt 53 kg (116 lb 13.5 oz)   BMI 20.70 kg/m²     Physical Exam  Vitals reviewed.   Constitutional:       Appearance: Normal appearance.   HENT:      Head: Normocephalic.   Pulmonary:      Effort: Pulmonary effort is normal. No respiratory distress.   Abdominal:      General: There is no distension.      Palpations: Abdomen is soft.      Tenderness: There is no abdominal tenderness.   Musculoskeletal:     " " Comments: Ambulates with walker   Skin:     General: Skin is warm.   Neurological:      Mental Status: She is alert and oriented to person, place, and time.   Psychiatric:         Behavior: Behavior normal.         Thought Content: Thought content normal.         Routine labs:  Lab Results   Component Value Date    WBC 12.45 12/29/2023    HGB 12.3 12/29/2023    HCT 38.8 12/29/2023     (H) 12/29/2023     12/29/2023     Lab Results   Component Value Date    INR 1.0 10/29/2023     Lab Results   Component Value Date    IRON <10 (L) 12/25/2023    FERRITIN 611 (H) 12/25/2023    TIBC 247 (L) 12/25/2023    FESATURATED Unable to calculate 12/25/2023     Lab Results   Component Value Date     12/29/2023    K 3.6 12/29/2023     12/29/2023    CO2 32 (H) 12/29/2023    BUN 19 12/29/2023    CREATININE 0.7 12/29/2023     Lab Results   Component Value Date    ALBUMIN 3.0 (L) 12/29/2023    ALT 20 12/29/2023    AST 20 12/29/2023    ALKPHOS 77 12/29/2023    BILITOT 1.1 (H) 12/29/2023     No results found for: "GLUCOSE"  Lab Results   Component Value Date    TSH 0.719 10/28/2023     Lab Results   Component Value Date    CALCIUM 9.9 12/29/2023    PHOS 3.1 12/29/2023       Imaging:      I have reviewed prior labs, imaging, and notes.      Assessment:  1. Gastroesophageal reflux disease without esophagitis    2. Dysphagia, unspecified type      One episode of "feeling like food sticking in esophagus" accompanied by chest pain during hospitalization 12/2023.   She was prescribed Reglan PRN and given GI cocktail with full relief of symptoms. States this only occurs during hospital stays.   Does not experience any symptoms at home. Has done well since discharge. Reflux currently controlled with Protonix 40mg daily.     Plan:       Continue Protonix as previously prescribed.   If recurs consider esophagram with barium tablet vs EGD    Plan of care discussed with patient who is in agreement and verbalized " understanding.     I have explained the planned procedures to the patient.The risks, benefits and alternatives of the procedure were also explained in detail. Patient verbalized understanding, all questions were answered. The patient agrees to proceed as planned    Follow Up: RONAL Jimenez, MAREN,FNP-BC [facility] [place]    (Portions of this note were dictated using voice recognition software and may contain dictation related errors in spelling/grammar/syntax not found on text review)

## 2024-03-05 ENCOUNTER — LAB VISIT (OUTPATIENT)
Dept: LAB | Facility: OTHER | Age: 74
End: 2024-03-05
Attending: FAMILY MEDICINE
Payer: MEDICARE

## 2024-03-05 ENCOUNTER — OFFICE VISIT (OUTPATIENT)
Dept: OTOLARYNGOLOGY | Facility: CLINIC | Age: 74
End: 2024-03-05
Payer: MEDICARE

## 2024-03-05 ENCOUNTER — OFFICE VISIT (OUTPATIENT)
Dept: CARDIOLOGY | Facility: CLINIC | Age: 74
End: 2024-03-05
Payer: MEDICARE

## 2024-03-05 ENCOUNTER — TELEPHONE (OUTPATIENT)
Dept: HEMATOLOGY/ONCOLOGY | Facility: CLINIC | Age: 74
End: 2024-03-05
Payer: MEDICARE

## 2024-03-05 VITALS — OXYGEN SATURATION: 95 % | HEART RATE: 80 BPM | SYSTOLIC BLOOD PRESSURE: 122 MMHG | DIASTOLIC BLOOD PRESSURE: 68 MMHG

## 2024-03-05 VITALS
WEIGHT: 116 LBS | SYSTOLIC BLOOD PRESSURE: 130 MMHG | BODY MASS INDEX: 20.55 KG/M2 | HEART RATE: 106 BPM | DIASTOLIC BLOOD PRESSURE: 85 MMHG

## 2024-03-05 DIAGNOSIS — I65.21 STENOSIS OF RIGHT CAROTID ARTERY: ICD-10-CM

## 2024-03-05 DIAGNOSIS — R04.0 EPISTAXIS: Primary | ICD-10-CM

## 2024-03-05 DIAGNOSIS — I10 PRIMARY HYPERTENSION: Primary | ICD-10-CM

## 2024-03-05 DIAGNOSIS — I25.10 ATHEROSCLEROSIS OF NATIVE CORONARY ARTERY OF NATIVE HEART WITHOUT ANGINA PECTORIS: ICD-10-CM

## 2024-03-05 DIAGNOSIS — I70.0 AORTIC ATHEROSCLEROSIS: ICD-10-CM

## 2024-03-05 DIAGNOSIS — D53.9 MACROCYTIC ANEMIA: ICD-10-CM

## 2024-03-05 DIAGNOSIS — I27.20 PULMONARY HTN: ICD-10-CM

## 2024-03-05 DIAGNOSIS — R55 VASOVAGAL SYNCOPE: ICD-10-CM

## 2024-03-05 DIAGNOSIS — D53.9 MACROCYTIC ANEMIA: Primary | ICD-10-CM

## 2024-03-05 LAB
ALBUMIN SERPL BCP-MCNC: 3.3 G/DL (ref 3.5–5.2)
ALP SERPL-CCNC: 71 U/L (ref 55–135)
ALT SERPL W/O P-5'-P-CCNC: 19 U/L (ref 10–44)
ANION GAP SERPL CALC-SCNC: 9 MMOL/L (ref 8–16)
AST SERPL-CCNC: 25 U/L (ref 10–40)
BASOPHILS # BLD AUTO: 0.17 K/UL (ref 0–0.2)
BASOPHILS NFR BLD: 1.1 % (ref 0–1.9)
BILIRUB SERPL-MCNC: 0.8 MG/DL (ref 0.1–1)
BUN SERPL-MCNC: 24 MG/DL (ref 8–23)
CALCIUM SERPL-MCNC: 9.9 MG/DL (ref 8.7–10.5)
CHLORIDE SERPL-SCNC: 100 MMOL/L (ref 95–110)
CO2 SERPL-SCNC: 27 MMOL/L (ref 23–29)
CREAT SERPL-MCNC: 0.8 MG/DL (ref 0.5–1.4)
DIFFERENTIAL METHOD BLD: ABNORMAL
EOSINOPHIL # BLD AUTO: 1.4 K/UL (ref 0–0.5)
EOSINOPHIL NFR BLD: 9.2 % (ref 0–8)
ERYTHROCYTE [DISTWIDTH] IN BLOOD BY AUTOMATED COUNT: 19 % (ref 11.5–14.5)
EST. GFR  (NO RACE VARIABLE): >60 ML/MIN/1.73 M^2
FERRITIN SERPL-MCNC: 69 NG/ML (ref 20–300)
FOLATE SERPL-MCNC: 14.1 NG/ML (ref 4–24)
GLUCOSE SERPL-MCNC: 101 MG/DL (ref 70–110)
HAPTOGLOB SERPL-MCNC: 143 MG/DL (ref 30–250)
HBV CORE AB SERPL QL IA: NORMAL
HBV SURFACE AB SER-ACNC: <3 MIU/ML
HBV SURFACE AB SER-ACNC: NORMAL M[IU]/ML
HBV SURFACE AG SERPL QL IA: NORMAL
HCT VFR BLD AUTO: 33.2 % (ref 37–48.5)
HCV AB SERPL QL IA: NORMAL
HGB BLD-MCNC: 10.3 G/DL (ref 12–16)
HIV 1+2 AB+HIV1 P24 AG SERPL QL IA: NORMAL
HYPOCHROMIA BLD QL SMEAR: ABNORMAL
IMM GRANULOCYTES # BLD AUTO: 0.13 K/UL (ref 0–0.04)
IMM GRANULOCYTES NFR BLD AUTO: 0.9 % (ref 0–0.5)
IRON SERPL-MCNC: 42 UG/DL (ref 30–160)
LDH SERPL L TO P-CCNC: 218 U/L (ref 110–260)
LYMPHOCYTES # BLD AUTO: 1.6 K/UL (ref 1–4.8)
LYMPHOCYTES NFR BLD: 10.6 % (ref 18–48)
MCH RBC QN AUTO: 34 PG (ref 27–31)
MCHC RBC AUTO-ENTMCNC: 31 G/DL (ref 32–36)
MCV RBC AUTO: 110 FL (ref 82–98)
MONOCYTES # BLD AUTO: 3.1 K/UL (ref 0.3–1)
MONOCYTES NFR BLD: 20.6 % (ref 4–15)
NEUTROPHILS # BLD AUTO: 8.5 K/UL (ref 1.8–7.7)
NEUTROPHILS NFR BLD: 57.6 % (ref 38–73)
NRBC BLD-RTO: 0 /100 WBC
PATH REV BLD -IMP: NORMAL
PATH REV BLD -IMP: NORMAL
PLATELET # BLD AUTO: 387 K/UL (ref 150–450)
PLATELET BLD QL SMEAR: ABNORMAL
PMV BLD AUTO: 10.3 FL (ref 9.2–12.9)
POTASSIUM SERPL-SCNC: 3.1 MMOL/L (ref 3.5–5.1)
PROT SERPL-MCNC: 7.2 G/DL (ref 6–8.4)
RBC # BLD AUTO: 3.03 M/UL (ref 4–5.4)
RETICS/RBC NFR AUTO: 7.6 % (ref 0.5–2.5)
SATURATED IRON: 10 % (ref 20–50)
SODIUM SERPL-SCNC: 136 MMOL/L (ref 136–145)
TOTAL IRON BINDING CAPACITY: 406 UG/DL (ref 250–450)
TRANSFERRIN SERPL-MCNC: 274 MG/DL (ref 200–375)
VIT B12 SERPL-MCNC: 586 PG/ML (ref 210–950)
WBC # BLD AUTO: 14.84 K/UL (ref 3.9–12.7)

## 2024-03-05 PROCEDURE — 84165 PROTEIN E-PHORESIS SERUM: CPT | Mod: NTX | Performed by: INTERNAL MEDICINE

## 2024-03-05 PROCEDURE — 85025 COMPLETE CBC W/AUTO DIFF WBC: CPT | Mod: TXP | Performed by: INTERNAL MEDICINE

## 2024-03-05 PROCEDURE — 99214 OFFICE O/P EST MOD 30 MIN: CPT | Mod: S$GLB,TXP,, | Performed by: OTOLARYNGOLOGY

## 2024-03-05 PROCEDURE — 83521 IG LIGHT CHAINS FREE EACH: CPT | Mod: 59,TXP | Performed by: INTERNAL MEDICINE

## 2024-03-05 PROCEDURE — 99999 PR PBB SHADOW E&M-EST. PATIENT-LVL III: CPT | Mod: PBBFAC,TXP,, | Performed by: INTERNAL MEDICINE

## 2024-03-05 PROCEDURE — 87389 HIV-1 AG W/HIV-1&-2 AB AG IA: CPT | Mod: TXP | Performed by: INTERNAL MEDICINE

## 2024-03-05 PROCEDURE — 85060 BLOOD SMEAR INTERPRETATION: CPT | Mod: NTX,,, | Performed by: STUDENT IN AN ORGANIZED HEALTH CARE EDUCATION/TRAINING PROGRAM

## 2024-03-05 PROCEDURE — 82607 VITAMIN B-12: CPT | Mod: TXP | Performed by: INTERNAL MEDICINE

## 2024-03-05 PROCEDURE — 86704 HEP B CORE ANTIBODY TOTAL: CPT | Mod: TXP | Performed by: INTERNAL MEDICINE

## 2024-03-05 PROCEDURE — 86706 HEP B SURFACE ANTIBODY: CPT | Mod: 91,NTX | Performed by: INTERNAL MEDICINE

## 2024-03-05 PROCEDURE — 99214 OFFICE O/P EST MOD 30 MIN: CPT | Mod: NTX,S$GLB,, | Performed by: INTERNAL MEDICINE

## 2024-03-05 PROCEDURE — 82746 ASSAY OF FOLIC ACID SERUM: CPT | Mod: NTX | Performed by: INTERNAL MEDICINE

## 2024-03-05 PROCEDURE — 82728 ASSAY OF FERRITIN: CPT | Mod: TXP | Performed by: INTERNAL MEDICINE

## 2024-03-05 PROCEDURE — 80053 COMPREHEN METABOLIC PANEL: CPT | Mod: NTX | Performed by: INTERNAL MEDICINE

## 2024-03-05 PROCEDURE — 83540 ASSAY OF IRON: CPT | Mod: NTX | Performed by: INTERNAL MEDICINE

## 2024-03-05 PROCEDURE — 84165 PROTEIN E-PHORESIS SERUM: CPT | Mod: 26,NTX,, | Performed by: PATHOLOGY

## 2024-03-05 PROCEDURE — 83010 ASSAY OF HAPTOGLOBIN QUANT: CPT | Mod: TXP | Performed by: INTERNAL MEDICINE

## 2024-03-05 PROCEDURE — 83921 ORGANIC ACID SINGLE QUANT: CPT | Mod: TXP | Performed by: INTERNAL MEDICINE

## 2024-03-05 PROCEDURE — 36415 COLL VENOUS BLD VENIPUNCTURE: CPT | Mod: NTX | Performed by: INTERNAL MEDICINE

## 2024-03-05 PROCEDURE — 87340 HEPATITIS B SURFACE AG IA: CPT | Mod: NTX | Performed by: INTERNAL MEDICINE

## 2024-03-05 PROCEDURE — 86334 IMMUNOFIX E-PHORESIS SERUM: CPT | Mod: TXP | Performed by: INTERNAL MEDICINE

## 2024-03-05 PROCEDURE — 85045 AUTOMATED RETICULOCYTE COUNT: CPT | Mod: NTX | Performed by: INTERNAL MEDICINE

## 2024-03-05 PROCEDURE — 83615 LACTATE (LD) (LDH) ENZYME: CPT | Mod: NTX | Performed by: INTERNAL MEDICINE

## 2024-03-05 PROCEDURE — 86803 HEPATITIS C AB TEST: CPT | Mod: TXP | Performed by: INTERNAL MEDICINE

## 2024-03-05 PROCEDURE — 86334 IMMUNOFIX E-PHORESIS SERUM: CPT | Mod: 26,NTX,, | Performed by: PATHOLOGY

## 2024-03-05 RX ORDER — CARVEDILOL 6.25 MG/1
6.25 TABLET ORAL 2 TIMES DAILY WITH MEALS
Qty: 180 TABLET | Refills: 3 | Status: ON HOLD | OUTPATIENT
Start: 2024-03-05 | End: 2024-06-03 | Stop reason: HOSPADM

## 2024-03-05 NOTE — PROGRESS NOTES
[facility]    Chief Complaint  Chief Complaint   Patient presents with    Follow-up       HPI:    Returns for scheduled follow-up without complaints.  Since seen in November, she has had 1 additional hospitalization with viral pneumonia (influenza?).  Uses oxygen intermittently at home.  No syncope or near-syncope.    Medications  Current Outpatient Medications   Medication Sig Dispense Refill    acetaminophen (TYLENOL) 650 MG TbSR Take 1 tablet (650 mg total) by mouth every 6 to 8 hours as needed (pain (mild-moderate)). 120 tablet 0    albuterol-ipratropium (DUO-NEB) 2.5 mg-0.5 mg/3 mL nebulizer solution Take 3 mLs by nebulization every 6 (six) hours as needed for Wheezing. Rescue 90 mL 0    allopurinoL (ZYLOPRIM) 100 MG tablet Take 2 tablets (200 mg total) by mouth once daily. 180 tablet 3    aluminum & magnesium hydroxide-simethicone (MYLANTA MAX STRENGTH) 400-400-40 mg/5 mL suspension Take 30 mLs by mouth every 6 (six) hours as needed. 300 mL 0    amLODIPine (NORVASC) 5 MG tablet Take 1 tablet (5 mg total) by mouth once daily. 90 tablet 3    aspirin (ECOTRIN) 81 MG EC tablet Take 81 mg by mouth once daily.      atorvastatin (LIPITOR) 80 MG tablet TAKE 1 TABLET BY MOUTH EVERY DAY 90 tablet 3    bacitracin 500 unit/gram ointment Apply topically 3 (three) times daily.  0    calcium carbonate (TUMS) 200 mg calcium (500 mg) chewable tablet Take 2 tablets (1,000 mg total) by mouth 2 (two) times daily as needed for Heartburn.      carvediloL (COREG) 6.25 MG tablet Take 1 tablet (6.25 mg total) by mouth 2 (two) times daily with meals. 180 tablet 3    celecoxib (CELEBREX) 200 MG capsule Take 1 capsule (200 mg total) by mouth as needed.      colchicine (COLCRYS) 0.6 mg tablet Take 1 tablet (0.6 mg total) by mouth once daily. As needed for gout 30 tablet 11    COMIRNATY 2023-24, 11Y UP,,PF, 30 mcg/0.3 mL inection       FLUAD QUAD 2023-24,65Y UP,,PF, 60 mcg (15 mcg x 4)/0.5 mL Syrg       fluticasone  furoate-vilanteroL (BREO) 100-25 mcg/dose diskus inhaler Inhale 1 puff into the lungs once daily. Controller 30 each 11    fluticasone propionate (FLONASE) 50 mcg/actuation nasal spray SPRAY 2 pumps INTO EACH NOSTRIL ONCE DAILY 16 mL 3    folic acid (FOLVITE) 1 MG tablet Take 1 tablet (1 mg total) by mouth once daily. 90 tablet 3    furosemide (LASIX) 20 MG tablet Take 1 tablet (20 mg total) by mouth daily as needed (Leg swelling, SOB, Weight gain 3lb in 1 day or 5 lbs in 2 days.). 30 tablet 11    hydroCHLOROthiazide (HYDRODIURIL) 25 MG tablet Take 1 tablet (25 mg total) by mouth once daily. 90 tablet 3    melatonin (MELATIN) 3 mg tablet Take 2 tablets (6 mg total) by mouth nightly as needed for Insomnia.  0    montelukast (SINGULAIR) 10 mg tablet Take 1 tablet (10 mg total) by mouth every evening. 90 tablet 11    multivitamin (THERAGRAN) tablet Take 1 tablet by mouth once daily. 90 tablet 3    ORTHOVISC 30 mg/2 mL       pantoprazole (PROTONIX) 40 MG tablet Take 1 tablet (40 mg total) by mouth once daily. 90 tablet 0    prenatal no122/iron/folic acid (PRENATAL MULTI ORAL) Take 1 tablet by mouth once daily.      QUEtiapine (SEROQUEL) 50 MG tablet Take 1 tablet (50 mg total) by mouth 2 (two) times daily. 60 tablet 1    tiotropium bromide (SPIRIVA RESPIMAT) 2.5 mcg/actuation inhaler INHALE 2 PUFFS INTO THE LUNGS ONCE DAILY. CONTROLLER 4 g 2    VENOFER 100 mg iron/5 mL injection        No current facility-administered medications for this visit.     Facility-Administered Medications Ordered in Other Visits   Medication Dose Route Frequency Provider Last Rate Last Admin    mupirocin 2 % ointment   Nasal On Call Procedure Kang Diaz MD   Given at 10/25/23 1045    tranexamic acid (CYKLOKAPRON) 1,000 mg in sodium chloride 0.9 % 100 mL IVPB (MB+)  1,000 mg Intravenous Once Kang Diaz MD        tranexamic acid (CYKLOKAPRON) 1,000 mg in sodium chloride 0.9 % 100 mL IVPB (MB+)  1,000 mg Intravenous Once Joe,  MD Kang            History  Past Medical History:   Diagnosis Date    Allergic rhinitis     Amblyopia     Carotid stenosis     Coronary atherosclerosis     [facility] 2014: 20% mLAD, 50% mCx, 20%, mRCA    Dyslipidemia     ESBL (extended spectrum beta-lactamase) producing bacteria infection     UTI    Hypertension     Nausea and vomiting 2017    Pulmonary emphysema 10/28/2023    SAH (subarachnoid hemorrhage) 2016, s/p clipping    Subarachnoid hemorrhage due to ruptured aneurysm      Past Surgical History:   Procedure Laterality Date    ADENOIDECTOMY      ANTERIOR CRUCIATE LIGAMENT REPAIR      APPENDECTOMY      CATARACT EXTRACTION W/  INTRAOCULAR LENS IMPLANT Right 2022    Procedure: EXTRACTION, CATARACT, WITH IOL INSERTION;  Surgeon: Higinio Cristina MD;  Location: [facility];  Service: Ophthalmology;  Laterality: Right;    CATARACT EXTRACTION W/  INTRAOCULAR LENS IMPLANT Left 2022    Procedure: EXTRACTION, CATARACT, WITH IOL INSERTION;  Surgeon: Higinio Cristina MD;  Location: [facility] OR;  Service: Ophthalmology;  Laterality: Left;    CEREBRAL ANEURYSM REPAIR      clip    DENTAL SURGERY      EYE SURGERY Bilateral     cataract removal and lens implants    HIP ARTHROPLASTY Left 2023    Procedure: TOTAL HIP ARTHROPLASTY;  Surgeon: Juan Wan MD;  Location: [address];  Service: Orthopedics;  Laterality: Left;    TONSILLECTOMY       Social History     Socioeconomic History    Marital status:    Occupational History    Occupation: Retired   Tobacco Use    Smoking status: Former     Current packs/day: 0.00     Average packs/day: 0.5 packs/day for 20.0 years (10.0 ttl pk-yrs)     Types: Cigarettes     Start date: 1979     Quit date: 1999     Years since quittin.1     Passive exposure: Past    Smokeless tobacco: Never   Substance and Sexual Activity    Alcohol use: Yes     Alcohol/week: 7.0 standard drinks of alcohol     Types: 7 Glasses of  wine per week     Comment: One glass of Red wine prior to dinner    Drug use: No    Sexual activity: Yes     Partners: Male   Social History Narrative    .  Has 3 grown daughters.       Social Determinants of Health     Financial Resource Strain: Low Risk  (11/27/2023)    Overall Financial Resource Strain (CARDIA)     Difficulty of Paying Living Expenses: Not hard at all   Food Insecurity: No Food Insecurity (11/27/2023)    Hunger Vital Sign     Worried About Running Out of Food in the Last Year: Never true     Ran Out of Food in the Last Year: Never true   Transportation Needs: No Transportation Needs (11/27/2023)    PRAPARE - Transportation     Lack of Transportation (Medical): No     Lack of Transportation (Non-Medical): No   Physical Activity: Insufficiently Active (11/27/2023)    Exercise Vital Sign     Days of Exercise per Week: 1 day     Minutes of Exercise per Session: 30 min   Stress: No Stress Concern Present (11/27/2023)    Citizen of [place] of Occupational Health - Occupational Stress Questionnaire     Feeling of Stress : Not at all   Recent Concern: Stress - Stress Concern Present (10/28/2023)    Citizen of [place] of Occupational Health - Occupational Stress Questionnaire     Feeling of Stress : To some extent   Social Connections: Socially Integrated (11/27/2023)    Social Connection and Isolation Panel [NHANES]     Frequency of Communication with Friends and Family: More than three times a week     Frequency of Social Gatherings with Friends and Family: More than three times a week     Attends Rastafarian Services: More than 4 times per year     Active Member of Clubs or Organizations: Yes     Attends Club or Organization Meetings: 1 to 4 times per year     Marital Status:    Housing Stability: Low Risk  (11/27/2023)    Housing Stability Vital Sign     Unable to Pay for Housing in the Last Year: No     Number of Places Lived in the Last Year: 1     Unstable Housing in the Last Year: No      Family History   Problem Relation Age of Onset    Hypertension Mother     Aneurysm Mother     Hypertension Father     Alcohol abuse Father     Kidney disease Brother     Heart disease Brother     Gout Brother     No Known Problems Daughter     No Known Problems Daughter     No Known Problems Daughter         Allergies  Review of patient's allergies indicates:  No Known Allergies    Review of Systems   Review of Systems   Constitutional: Negative for malaise/fatigue, weight gain and weight loss.   Eyes:  Negative for visual disturbance.   Cardiovascular:  Positive for dyspnea on exertion. Negative for chest pain, claudication, cyanosis, irregular heartbeat, leg swelling, near-syncope, orthopnea, palpitations, paroxysmal nocturnal dyspnea and syncope.   Respiratory:  Negative for cough, hemoptysis, shortness of breath, sleep disturbances due to breathing and wheezing.    Hematologic/Lymphatic: Negative for bleeding problem. Does not bruise/bleed easily.   Skin:  Negative for poor wound healing.   Musculoskeletal:  Negative for muscle cramps and myalgias.   Gastrointestinal:  Negative for abdominal pain, anorexia, diarrhea, heartburn, hematemesis, hematochezia, melena, nausea and vomiting.   Genitourinary:  Negative for hematuria and nocturia.   Neurological:  Negative for excessive daytime sleepiness, dizziness, focal weakness, light-headedness and weakness.       Physical Exam  Vitals:    03/05/24 0939   BP: 122/68   Pulse: 80     Wt Readings from Last 1 Encounters:   03/04/24 53 kg (116 lb 13.5 oz)     Physical Exam  Constitutional:       General: She is not in acute distress.     Appearance: She is not toxic-appearing or diaphoretic.   HENT:      Head: Normocephalic and atraumatic.   Eyes:      General: No scleral icterus.     Conjunctiva/sclera: Conjunctivae normal.   Neck:      Thyroid: No thyromegaly.      Vascular: No carotid bruit, hepatojugular reflux or JVD.      Trachea: Trachea normal.    Cardiovascular:      Rate and Rhythm: Normal rate and regular rhythm. No extrasystoles are present.     Chest Wall: PMI is not displaced.      Pulses:           Carotid pulses are 2+ on the right side and 2+ on the left side.       Radial pulses are 2+ on the right side and 2+ on the left side.        Dorsalis pedis pulses are 2+ on the right side and 2+ on the left side.        Posterior tibial pulses are 2+ on the right side and 2+ on the left side.      Heart sounds: S1 normal and S2 normal. No murmur heard.     No S3 or S4 sounds.   Pulmonary:      Effort: No accessory muscle usage or respiratory distress.      Breath sounds: No decreased breath sounds, wheezing, rhonchi or rales.   Abdominal:      General: Bowel sounds are normal. There is no abdominal bruit.      Palpations: Abdomen is soft. There is no hepatomegaly, splenomegaly or pulsatile mass.      Tenderness: There is no abdominal tenderness.   Musculoskeletal:         General: No tenderness or deformity.      Right lower leg: No edema.      Left lower leg: No edema.   Skin:     General: Skin is warm and dry.      Coloration: Skin is pale. Skin is not cyanotic.      Nails: There is no clubbing.   Neurological:      General: No focal deficit present.      Mental Status: She is alert and oriented to person, place, and time.   Psychiatric:         Speech: Speech normal.         Behavior: Behavior normal. Behavior is cooperative.         Labs  No results displayed because visit has over 200 results.      Lab Visit on 12/15/2023   Component Date Value Ref Range Status    WBC 12/15/2023 12.46  3.90 - 12.70 K/uL Final    RBC 12/15/2023 3.08 (L)  4.00 - 5.40 M/uL Final    Hemoglobin 12/15/2023 10.7 (L)  12.0 - 16.0 g/dL Final    Hematocrit 12/15/2023 33.4 (L)  37.0 - 48.5 % Final    MCV 12/15/2023 108 (H)  82 - 98 fL Final    MCH 12/15/2023 34.7 (H)  27.0 - 31.0 pg Final    MCHC 12/15/2023 32.0  32.0 - 36.0 g/dL Final    RDW 12/15/2023 18.6 (H)  11.5 - 14.5 %  Final    Platelets 12/15/2023 330  150 - 450 K/uL Final    MPV 12/15/2023 10.2  9.2 - 12.9 fL Final    Immature Granulocytes 12/15/2023 2.1 (H)  0.0 - 0.5 % Final    Gran # (ANC) 12/15/2023 7.2  1.8 - 7.7 K/uL Final    Immature Grans (Abs) 12/15/2023 0.26 (H)  0.00 - 0.04 K/uL Final    Comment: Mild elevation in immature granulocytes is non specific and   can be seen in a variety of conditions including stress response,   acute inflammation, trauma and pregnancy. Correlation with other   laboratory and clinical findings is essential.      Lymph # 12/15/2023 2.0  1.0 - 4.8 K/uL Final    Mono # 12/15/2023 2.0 (H)  0.3 - 1.0 K/uL Final    Eos # 12/15/2023 0.8 (H)  0.0 - 0.5 K/uL Final    Baso # 12/15/2023 0.21 (H)  0.00 - 0.20 K/uL Final    nRBC 12/15/2023 0  0 /100 WBC Final    Gran % 12/15/2023 57.5  38.0 - 73.0 % Final    Lymph % 12/15/2023 16.1 (L)  18.0 - 48.0 % Final    Mono % 12/15/2023 16.0 (H)  4.0 - 15.0 % Final    Eosinophil % 12/15/2023 6.6  0.0 - 8.0 % Final    Basophil % 12/15/2023 1.7  0.0 - 1.9 % Final    Differential Method 12/15/2023 Automated   Final    Ferritin 12/15/2023 508 (H)  20.0 - 300.0 ng/mL Final    Iron 12/15/2023 49  30 - 160 ug/dL Final    Transferrin 12/15/2023 189 (L)  200 - 375 mg/dL Final    TIBC 12/15/2023 280  250 - 450 ug/dL Final    Saturated Iron 12/15/2023 18 (L)  20 - 50 % Final   Hospital Outpatient Visit on 12/01/2023   Component Date Value Ref Range Status    Physician Comment 12/01/2023    Final                    Value:Spirometry shows moderate-severe obstruction. Lung volume determination shows TLC is normal with evidence air trapping is present. Airway mechanics are abnormal showing increased airway resistance and decreased conductance. DLCO is severely decreased.   Â   Notes: DLCO interpretation based on the adjusted DLCO value due to a low hemoglobin.       Pre FVC 12/01/2023 1.95  1.88 - 3.40 L Final    PRE FEV5 12/01/2023 0.76  0.74 - 2.45 L Final    Pre FEV1  12/01/2023 1.12 (L)  1.44 - 2.56 L Final    Pre FEV1 FVC 12/01/2023 57.36 (L)  63.86 - 89.74 % Final    Pre FEF 25 75 12/01/2023 0.54 (L)  0.75 - 3.06 L/s Final    Pre PEF 12/01/2023 2.55 (L)  3.52 - 6.84 L/s Final    Pre  12/01/2023 5.38  sec Final    Pre DLCO 12/01/2023 5.92 (L)  14.34 - 25.81 ml/(min*mmHg) Final    DLCO ADJ PRE 12/01/2023 6.28 (L)  14.34 - 25.81 ml/(min*mmHg) Final    DLCOVA PRE 12/01/2023 1.81 (L)  2.72 - 5.69 ml/(min*mmHg*L) Final    DLVAAdj PRE 12/01/2023 1.92 (L)  2.72 - 5.69 ml/(min*mmHg*L) Final    VA PRE 12/01/2023 3.26 (L)  4.62 - 4.62 L Final    IVC PRE 12/01/2023 1.78 (L)  1.88 - 3.40 L Final    Pre TLC 12/01/2023 5.13  3.78 - 5.76 L Final    VC PRE 12/01/2023 1.95  1.88 - 3.40 L Final    PRE IC 12/01/2023 1.44  -18283.18 - 23872.82 L Final    Pre FRC PL 12/01/2023 3.69 (H)  1.84 - 3.48 L Final    Pre ERV 12/01/2023 0.51  -91230.42 - 08059.58 L Final    Pre RV 12/01/2023 3.18 (H)  1.50 - 2.66 L Final    RVTLC PRE 12/01/2023 61.94 (H)  34.53 - 53.71 % Final    Raw PRE 12/01/2023 5.70 (H)  3.06 - 3.06 cmH2O*s/L Final    sGaw PRE 12/01/2023 0.04 (L)  0.10 - 0.10 1/(cmH2O*s) Final    FVC Ref 12/01/2023 2.62   Final    FVC LLN 12/01/2023 1.88   Final    FVC Pre Ref 12/01/2023 74.7  % Final    FEV05 REF 12/01/2023 1.60   Final    FEV05 LLN 12/01/2023 0.74   Final    PRE FEV05 REF 12/01/2023 47.6  % Final    FEV1 Ref 12/01/2023 2.02   Final    FEV1 LLN 12/01/2023 1.44   Final    FEV1 Pre Ref 12/01/2023 55.6  % Final    FEV1 FVC Ref 12/01/2023 78   Final    FEV1 FVC LLN 12/01/2023 64   Final    FEV1 FVC Pre Ref 12/01/2023 73.8  % Final    FEF 25 75 Ref 12/01/2023 1.69   Final    FEF 25 75 LLN 12/01/2023 0.75   Final    FEF 25 75 Pre Ref 12/01/2023 31.9  % Final    PEF Ref 12/01/2023 5.18   Final    PEF LLN 12/01/2023 3.52   Final    PEF Pre Ref 12/01/2023 49.2  % Final    TLC Ref 12/01/2023 4.77   Final    TLC LLN 12/01/2023 3.78   Final    TLC ULN 12/01/2023 5.76   Final    TLC Pre Ref  12/01/2023 107.6  % Final    VC Ref 12/01/2023 2.62   Final    VC LLN 12/01/2023 1.88   Final    VC ULN 12/01/2023 3.40   Final    VC Pre Ref 12/01/2023 74.7  % Final    REF IC 12/01/2023 1.82   Final    LLN IC 12/01/2023 -59447.18   Final    ULN IC 12/01/2023 54189.82   Final    PRE REF IC 12/01/2023 79.0  % Final    FRCpleth Ref 12/01/2023 2.66   Final    FRCpleth LLN 12/01/2023 1.84   Final    FRC PLETH ULN 12/01/2023 3.48   Final    FRCpleth PreRef 12/01/2023 138.9  % Final    ERV Ref 12/01/2023 0.58   Final    ERV LLN 12/01/2023 -90595.42   Final    ERV ULN 12/01/2023 80332.58   Final    ERV Pre Ref 12/01/2023 88.8  % Final    RV Ref 12/01/2023 2.08   Final    RV LLN 12/01/2023 1.50   Final    RV ULN 12/01/2023 2.66   Final    RV Pre Ref 12/01/2023 152.9  % Final    RVTLC Ref 12/01/2023 44   Final    RVTLC LLN 12/01/2023 35   Final    RV TLC ULN 12/01/2023 54   Final    RVTLC Pre Ref 12/01/2023 140.4  % Final    Raw Ref 12/01/2023 3.06   Final    Raw Pre Ref 12/01/2023 186.2  % Final    sGaw Ref 12/01/2023 0.10   Final    sGaw Pre Ref 12/01/2023 42.0  % Final    DLCO Single Breath Ref 12/01/2023 20.08   Final    DLCO Single Breath LLN 12/01/2023 14.34   Final    DLCO SINGLEBREATH ULN 12/01/2023 25.81   Final    DLCO Single Breath Pre Ref 12/01/2023 29.5  % Final    DLCOc Single Breath Ref 12/01/2023 20.08   Final    DLCOc Single Breath LLN 12/01/2023 14.34   Final    DLCOC SINGLEBREATH ULN 12/01/2023 25.81   Final    DLCOc Single Breath Pre Ref 12/01/2023 31.3  % Final    DLCOVA Ref 12/01/2023 4.21   Final    DLCOVA LLN 12/01/2023 2.72   Final    DLCOVA ULN 12/01/2023 5.69   Final    DLCOVA Pre Ref 12/01/2023 43.1  % Final    DLCOc SBVA Ref 12/01/2023 4.21   Final    DLCOc SBVA LLN 12/01/2023 2.72   Final    DLCOCSBVA ULN 12/01/2023 5.69   Final    DLCOc SBVA Pre Ref 12/01/2023 45.7  % Final    VA Single Breath Ref 12/01/2023 4.62   Final    VA Single Breath LLN 12/01/2023 4.62   Final    VA SINGLEBREATH ULN  12/01/2023 4.62   Final    VA Single Breath Pre Ref 12/01/2023 70.6  % Final    IVC Single Breath Ref 12/01/2023 2.62   Final    IVC Single Breath LLN 12/01/2023 1.88   Final    IVC SINGLEBREATH ULN 12/01/2023 3.40   Final    IVC Single Breath Pre Ref 12/01/2023 68.1  % Final   No results displayed because visit has over 200 results.      Office Visit on 11/14/2023   Component Date Value Ref Range Status    POC Molecular Influenza A Ag 11/14/2023 Negative  Negative, Not Reported Final    POC Molecular Influenza B Ag 11/14/2023 Negative  Negative, Not Reported Final     Acceptable 11/14/2023 Yes   Final    SARS Coronavirus 2 Antigen 11/14/2023 Positive (A)  Negative Final     Acceptable 11/14/2023 Yes   Final   No results displayed because visit has over 200 results.      Hospital Outpatient Visit on 10/24/2023   Component Date Value Ref Range Status    Group & Rh 10/24/2023 A POS   Final    Indirect Gerardo 10/24/2023 NEG   Final    Specimen Outdate 10/24/2023 10/27/2023 23:59   Final   Office Visit on 09/20/2023   Component Date Value Ref Range Status    SARS Coronavirus 2 Antigen 09/20/2023 Negative  Negative Final     Acceptable 09/20/2023 Yes   Final       Imaging  Sports Medicine US - Guidance for Needle Placement    Result Date: 2/28/2024  Armani Cai PA-C     2/28/2024  2:38 PM Sports Medicine US - Guidance for Needle Placement Date/Time: 2/28/2024 1:30 PM Performed by: Armani Cai PA-C Authorized by: Armani Cai PA-C  Preparation: Patient was prepped and draped in the usual sterile fashion. Local anesthesia used: yes Anesthesia: Local anesthesia used: yes Local Anesthetic: co-phenylcaine spray Sedation: Patient sedated: no Patient tolerance: patient tolerated the procedure well with no immediate complications       Assessment  1. Pulmonary HTN  Mild and requires no further investigations  - Ambulatory referral/consult to Cardiology    2. Primary  hypertension  Controlled    3. Vasovagal syncope  Controlled    4. Stenosis of right carotid artery  Last Doppler in October showed no significant narrowing    5. Atherosclerosis of native coronary artery of native heart without angina pectoris  Nonobstructive disease    6. Aortic atherosclerosis  Continue risk factor modification efforts      Plan and Discussion    No change to current guideline directed medical therapy.    The 10-year ASCVD risk score (Joyce GONZALEZ, et al., 2019) is: 17.1%    Values used to calculate the score:      Age: 73 years      Sex: Female      Is Non- : No      Diabetic: No      Tobacco smoker: No      Systolic Blood Pressure: 122 mmHg      Is BP treated: Yes      HDL Cholesterol: 69 mg/dL      Total Cholesterol: 176 mg/dL     Follow Up  Follow up in about 6 months (around 9/5/2024).      Michael Lal MD

## 2024-03-05 NOTE — TELEPHONE ENCOUNTER
LVM for patient to let her know that  would like to get some labs prior to seeing her. I made her a lab appt for today and I will r/s her Doctors visit in two weeks. If she has any questions she may give me a call.     Geneva BOOTH

## 2024-03-05 NOTE — PROGRESS NOTES
Ms. Floyd     Vitals:    24 1418   BP: 130/85   Pulse: 106       Chief Complaint:  Nosebleeds     HPI:   is a 73-year-old white female who presents with complaints of nosebleeds.  She has had a past history of these and had seen Dr. Eason last year twice for this most recent approximately 6 months ago.  She states that she had done well until last week when she developed an additional significant nosebleed again from her right side.  She presented to the [facility] urgent care where she was cauterized and packed.  She presented back 2 days later in the packs were removed.  She has had no further bleeding over this past week.  She is on a baby aspirin daily as she is status post carotid stents.    SNOT22- 15 NOSE- 30    Review of Systems:  Constitutional:   weight loss or weight gain: Negative  Allergy/Immunologic:   Negative  Nasal Congestion/Obstruction:   Negative  Nosebleeds:   Positive as above  Sinus infections:   Negative  Headache/Facial Pain:   Positive for facial pressure pain and swelling as well as headache  Snoring/TISHA:   Negative  Throat: Infections/Pain:   Negative  Hoarseness/Speech Disturbance:   Negative  Trauma Hx:  Negative    Cardiovascular:  M/I Angina:  Positive for coronary artery disease  Hypertension:  Positive  Endocrine:    DM/Steroids: Negative  GI:   Dysphagia/Reflux:  Positive  :   GYN Pregnancy: Negative  Renal:   Dialysis: Negative  Lymphatic:   Neck Mass/Lymphadenopathy: Negative  Muscoloskeletal:   Negative  Hematologic:   Bleeding Disorders/Anemia: Negative  Neurologic:    Cranial/Neuralgia: Negative  Pulmonary:   Asthma/SOB/Cough:  Positive history of emphysema  Skin Disorders: Negative    Past Medical/Surgical/Family/Social History:    ENT Surgery: Negative  Occupational Exposure: Negative   Problems: Negative  Cancer: Negative    Past Family History:   Family history of Cancer: Negative    Past Social History:   Tobacco: Nonsmoker   Alcohol:  Non  Drinker      Allergies and medications: Reviewed per med card.    Physical Examination:  Ears:   External auditory canals:  Clear   Hearing: Grossly intact   Tympanic Membranes: Clear  Nose:   External: Normal   Intranasal:  Anterior septal deviation to the left with 2+ turbinates.  Healing area of cauterization in the right superior anterior to mid septal region.  Mouth:   Intraorally: Lips, teeth, and gums: Normal   Oropharynx: Normal   Mucosa: Normal   Tongue: Normal  Throat:      Palate: Normal palate with elevation   Tonsils:  Absent   Posterior Pharynx: Normal  Fiberoptic exam: Not performed  Head/Face:     Inspection: Normal and atraumatic   Palpation/Percussion: Non tender   Facial strength: Normal and symmetric   Salivary glands: Normal  Neck: Supple  Thyroid: No masses  Lymphatics: No nodes  Respiratory:   Effort: Normal  Eyes:   Ocular Mobility: Normal   Vision: Grossly intact  Neuro/Psych:   Cranial Nerves: Grossly Intact   Orientation: Normal   Mood/Affect: Normal      Assessment/Plan:  I have discussed my findings with her in detail as well as my recommendations for treatment.  I have given her an epistaxis precaution sheet and reviewed all the recommendations with her in detail.  I have recommended that if she have another nasal bleed which she is unable to stop she should present to the [facility] [place] ED for consideration of interventional radiology evaluation and treatment.  She will return to clinic here p.r.n.

## 2024-03-06 ENCOUNTER — PATIENT MESSAGE (OUTPATIENT)
Dept: INTERNAL MEDICINE | Facility: CLINIC | Age: 74
End: 2024-03-06
Payer: MEDICARE

## 2024-03-06 LAB
ALBUMIN SERPL ELPH-MCNC: 3.64 G/DL (ref 3.35–5.55)
ALPHA1 GLOB SERPL ELPH-MCNC: 0.3 G/DL (ref 0.17–0.41)
ALPHA2 GLOB SERPL ELPH-MCNC: 0.76 G/DL (ref 0.43–0.99)
B-GLOBULIN SERPL ELPH-MCNC: 0.74 G/DL (ref 0.5–1.1)
GAMMA GLOB SERPL ELPH-MCNC: 1.46 G/DL (ref 0.67–1.58)
INTERPRETATION SERPL IFE-IMP: NORMAL
KAPPA LC SER QL IA: 5.5 MG/DL (ref 0.33–1.94)
KAPPA LC/LAMBDA SER IA: 1.38 (ref 0.26–1.65)
LAMBDA LC SER QL IA: 3.99 MG/DL (ref 0.57–2.63)
PATHOLOGIST INTERPRETATION IFE: NORMAL
PATHOLOGIST INTERPRETATION SPE: NORMAL
PROT SERPL-MCNC: 6.9 G/DL (ref 6–8.4)

## 2024-03-12 LAB — METHYLMALONATE SERPL-SCNC: 1.72 UMOL/L

## 2024-03-22 ENCOUNTER — DOCUMENT SCAN (OUTPATIENT)
Dept: HOME HEALTH SERVICES | Facility: HOSPITAL | Age: 74
End: 2024-03-22
Payer: MEDICARE

## 2024-03-25 DIAGNOSIS — F43.10 PTSD (POST-TRAUMATIC STRESS DISORDER): ICD-10-CM

## 2024-03-25 DIAGNOSIS — F41.9 SEVERE ANXIETY: Chronic | ICD-10-CM

## 2024-03-25 PROBLEM — A41.9 SEPSIS: Status: RESOLVED | Noted: 2023-12-25 | Resolved: 2024-03-25

## 2024-03-25 PROBLEM — J96.01 ACUTE HYPOXIC RESPIRATORY FAILURE: Status: RESOLVED | Noted: 2023-10-30 | Resolved: 2024-03-25

## 2024-03-25 NOTE — TELEPHONE ENCOUNTER
No care due was identified.  [facility] Embedded Care Due Messages. Reference number: [id number].   3/25/2024 4:53:44 PM CDT

## 2024-03-26 ENCOUNTER — OFFICE VISIT (OUTPATIENT)
Dept: HEMATOLOGY/ONCOLOGY | Facility: CLINIC | Age: 74
End: 2024-03-26
Payer: MEDICARE

## 2024-03-26 ENCOUNTER — TELEPHONE (OUTPATIENT)
Dept: GASTROENTEROLOGY | Facility: CLINIC | Age: 74
End: 2024-03-26
Payer: MEDICARE

## 2024-03-26 ENCOUNTER — TELEPHONE (OUTPATIENT)
Dept: INTERNAL MEDICINE | Facility: CLINIC | Age: 74
End: 2024-03-26
Payer: MEDICARE

## 2024-03-26 ENCOUNTER — LAB VISIT (OUTPATIENT)
Dept: LAB | Facility: OTHER | Age: 74
End: 2024-03-26
Attending: FAMILY MEDICINE
Payer: MEDICARE

## 2024-03-26 VITALS
RESPIRATION RATE: 17 BRPM | HEIGHT: 63 IN | HEART RATE: 82 BPM | DIASTOLIC BLOOD PRESSURE: 64 MMHG | BODY MASS INDEX: 20.54 KG/M2 | WEIGHT: 115.94 LBS | SYSTOLIC BLOOD PRESSURE: 119 MMHG | OXYGEN SATURATION: 95 % | TEMPERATURE: 98 F

## 2024-03-26 DIAGNOSIS — E78.5 DYSLIPIDEMIA: Chronic | ICD-10-CM

## 2024-03-26 DIAGNOSIS — M81.0 OSTEOPOROSIS, UNSPECIFIED OSTEOPOROSIS TYPE, UNSPECIFIED PATHOLOGICAL FRACTURE PRESENCE: ICD-10-CM

## 2024-03-26 DIAGNOSIS — N18.31 CKD STAGE G3A/A1, GFR 45-59 AND ALBUMIN CREATININE RATIO <30 MG/G: ICD-10-CM

## 2024-03-26 DIAGNOSIS — I10 PRIMARY HYPERTENSION: Chronic | ICD-10-CM

## 2024-03-26 DIAGNOSIS — E53.8 B12 DEFICIENCY: Primary | ICD-10-CM

## 2024-03-26 DIAGNOSIS — E53.8 VITAMIN B12 DEFICIENCY: ICD-10-CM

## 2024-03-26 DIAGNOSIS — R13.10 DYSPHAGIA, UNSPECIFIED TYPE: ICD-10-CM

## 2024-03-26 DIAGNOSIS — D50.0 IRON DEFICIENCY ANEMIA SECONDARY TO BLOOD LOSS (CHRONIC): ICD-10-CM

## 2024-03-26 DIAGNOSIS — K21.9 GASTROESOPHAGEAL REFLUX DISEASE WITHOUT ESOPHAGITIS: Primary | ICD-10-CM

## 2024-03-26 DIAGNOSIS — D50.8 OTHER IRON DEFICIENCY ANEMIA: ICD-10-CM

## 2024-03-26 DIAGNOSIS — I50.30 HEART FAILURE WITH PRESERVED EJECTION FRACTION, UNSPECIFIED HF CHRONICITY: ICD-10-CM

## 2024-03-26 DIAGNOSIS — J10.1 INFLUENZA A: ICD-10-CM

## 2024-03-26 DIAGNOSIS — J43.2 CENTRILOBULAR EMPHYSEMA: Chronic | ICD-10-CM

## 2024-03-26 DIAGNOSIS — D72.10 EOSINOPHILIA, UNSPECIFIED TYPE: ICD-10-CM

## 2024-03-26 DIAGNOSIS — I25.10 CORONARY ARTERY DISEASE INVOLVING NATIVE CORONARY ARTERY OF NATIVE HEART WITHOUT ANGINA PECTORIS: Chronic | ICD-10-CM

## 2024-03-26 DIAGNOSIS — D53.9 MACROCYTIC ANEMIA: Primary | ICD-10-CM

## 2024-03-26 DIAGNOSIS — Z12.11 SCREENING FOR COLON CANCER: ICD-10-CM

## 2024-03-26 DIAGNOSIS — U07.1 COVID-19: ICD-10-CM

## 2024-03-26 PROBLEM — R11.2 NAUSEA AND VOMITING: Status: RESOLVED | Noted: 2023-10-16 | Resolved: 2024-03-26

## 2024-03-26 LAB
ALBUMIN SERPL BCP-MCNC: 3.7 G/DL (ref 3.5–5.2)
ALP SERPL-CCNC: 78 U/L (ref 55–135)
ALT SERPL W/O P-5'-P-CCNC: 22 U/L (ref 10–44)
ANION GAP SERPL CALC-SCNC: 13 MMOL/L (ref 8–16)
ANISOCYTOSIS BLD QL SMEAR: SLIGHT
AST SERPL-CCNC: 32 U/L (ref 10–40)
BASOPHILS # BLD AUTO: 0.16 K/UL (ref 0–0.2)
BASOPHILS NFR BLD: 1.2 % (ref 0–1.9)
BILIRUB SERPL-MCNC: 0.6 MG/DL (ref 0.1–1)
BUN SERPL-MCNC: 32 MG/DL (ref 8–23)
CALCIUM SERPL-MCNC: 9.7 MG/DL (ref 8.7–10.5)
CHLORIDE SERPL-SCNC: 94 MMOL/L (ref 95–110)
CO2 SERPL-SCNC: 29 MMOL/L (ref 23–29)
CREAT SERPL-MCNC: 1.1 MG/DL (ref 0.5–1.4)
DIFFERENTIAL METHOD BLD: ABNORMAL
EOSINOPHIL # BLD AUTO: 0.8 K/UL (ref 0–0.5)
EOSINOPHIL NFR BLD: 6.2 % (ref 0–8)
ERYTHROCYTE [DISTWIDTH] IN BLOOD BY AUTOMATED COUNT: 17.5 % (ref 11.5–14.5)
EST. GFR  (NO RACE VARIABLE): 53 ML/MIN/1.73 M^2
FERRITIN SERPL-MCNC: 36 NG/ML (ref 20–300)
GLUCOSE SERPL-MCNC: 99 MG/DL (ref 70–110)
HCT VFR BLD AUTO: 32.2 % (ref 37–48.5)
HGB BLD-MCNC: 10 G/DL (ref 12–16)
HYPOCHROMIA BLD QL SMEAR: ABNORMAL
IMM GRANULOCYTES # BLD AUTO: 0.08 K/UL (ref 0–0.04)
IMM GRANULOCYTES NFR BLD AUTO: 0.6 % (ref 0–0.5)
LYMPHOCYTES # BLD AUTO: 2.2 K/UL (ref 1–4.8)
LYMPHOCYTES NFR BLD: 17.1 % (ref 18–48)
MCH RBC QN AUTO: 32.8 PG (ref 27–31)
MCHC RBC AUTO-ENTMCNC: 31.1 G/DL (ref 32–36)
MCV RBC AUTO: 106 FL (ref 82–98)
MONOCYTES # BLD AUTO: 2.4 K/UL (ref 0.3–1)
MONOCYTES NFR BLD: 18.6 % (ref 4–15)
NEUTROPHILS # BLD AUTO: 7.2 K/UL (ref 1.8–7.7)
NEUTROPHILS NFR BLD: 56.3 % (ref 38–73)
NRBC BLD-RTO: 0 /100 WBC
OVALOCYTES BLD QL SMEAR: ABNORMAL
PLATELET # BLD AUTO: 365 K/UL (ref 150–450)
PLATELET BLD QL SMEAR: ABNORMAL
PMV BLD AUTO: 10.4 FL (ref 9.2–12.9)
POIKILOCYTOSIS BLD QL SMEAR: SLIGHT
POTASSIUM SERPL-SCNC: 3 MMOL/L (ref 3.5–5.1)
PROT SERPL-MCNC: 7.8 G/DL (ref 6–8.4)
RBC # BLD AUTO: 3.05 M/UL (ref 4–5.4)
SODIUM SERPL-SCNC: 136 MMOL/L (ref 136–145)
WBC # BLD AUTO: 12.81 K/UL (ref 3.9–12.7)

## 2024-03-26 PROCEDURE — 99215 OFFICE O/P EST HI 40 MIN: CPT | Mod: S$GLB,,, | Performed by: INTERNAL MEDICINE

## 2024-03-26 PROCEDURE — 86340 INTRINSIC FACTOR ANTIBODY: CPT | Mod: TXP | Performed by: INTERNAL MEDICINE

## 2024-03-26 PROCEDURE — 82607 VITAMIN B-12: CPT | Mod: NTX | Performed by: INTERNAL MEDICINE

## 2024-03-26 PROCEDURE — 80053 COMPREHEN METABOLIC PANEL: CPT | Mod: NTX | Performed by: INTERNAL MEDICINE

## 2024-03-26 PROCEDURE — 85025 COMPLETE CBC W/AUTO DIFF WBC: CPT | Mod: TXP | Performed by: INTERNAL MEDICINE

## 2024-03-26 PROCEDURE — 82728 ASSAY OF FERRITIN: CPT | Mod: TXP | Performed by: INTERNAL MEDICINE

## 2024-03-26 PROCEDURE — 99999 PR PBB SHADOW E&M-EST. PATIENT-LVL III: CPT | Mod: PBBFAC,,, | Performed by: INTERNAL MEDICINE

## 2024-03-26 PROCEDURE — 82941 ASSAY OF GASTRIN: CPT | Mod: NTX | Performed by: INTERNAL MEDICINE

## 2024-03-26 RX ORDER — CYANOCOBALAMIN 1000 UG/ML
INJECTION, SOLUTION INTRAMUSCULAR; SUBCUTANEOUS
Qty: 30 ML | Refills: 0 | Status: SHIPPED | OUTPATIENT
Start: 2024-03-26 | End: 2024-04-26 | Stop reason: SDUPTHER

## 2024-03-26 RX ORDER — QUETIAPINE FUMARATE 50 MG/1
50 TABLET, FILM COATED ORAL NIGHTLY
Qty: 90 TABLET | Refills: 1 | Status: SHIPPED | OUTPATIENT
Start: 2024-03-26 | End: 2024-04-17 | Stop reason: SDUPTHER

## 2024-03-26 NOTE — Clinical Note
Kishan Roberts (pulmonology) and Dr. Jacinto (PCP),  My name is Agata Rolon, one of the med oncs. I met Ms. Floyd today for her multifactorial anemia - I looked back at her last CT (10/31 and 11/2023) when she was admitted for COVID and there is comment of some new micronodules, presumably infectious/ inflammatory but I wanted to make sure additional imaging isn't warranted to ensure resolution.   Thanks!

## 2024-03-26 NOTE — TELEPHONE ENCOUNTER
Spoke with pt and informed that Dr. Rolon with hematology reached out to us about scheduling her for an egd/colonoscopy. Pt stated she will give us a call back to scheduled b/s she would like to speak with her  first. Verbal understanding

## 2024-03-26 NOTE — PROGRESS NOTES
[facility]     Outpatient Hematology/Medical Oncology Note  Patient: Jessica Floyd  MRN: [id number]  Primary Care Provider: Effie Olmedo MD  Chief Complaint: Macrocytic Anemia - Evidence of Iron and B12 Deficiency     Subjective     Subjective:   HPI:   Jessica Floyd is a 73 y.o. female with PMH significant for:   - Nonobstructive CAD  - HTN (HCTZ)/CKD  - COPD/PH on TTE   - SAH ( to ruptured aneurysm in )  - Right carotid artery stent (iatrogenic rupture during cerebral angiography)  - Epistaxis (2023, presumably from dry nasal mucosa, requiring rhino rocket)   - HFpEF (lasix)  - Osteoporosis (Left femur fracture s/p THR -2023)    She is followed by Hematology for a 2 year history of macrocytic anemia, first identified in 10/2022 with lab evidence of iron deficiency. She was previously following with Dr. Martinez and presents today to transfer care.     HPI:  2023: presented to the ED with significant epistaxis  2023 :ENT - diagnosed with chronic maxillary sinusitis and nasal polyposis  2023: first appointment with Hematology for new onset ERIK of unclear cause   Nov and Dec 2023: admissions for COVID and influenza with subsequent COPD exacerbations     Hematology History:  10/2022: first noted to be anemic - Hgb Range: 8.5 - 12 range, MCV: 105, WBC and platelets: wnl (WBC elevated in setting of recent viral illnesses, anemia nadiring after L THR and hospitalizations for COVID, flu, CAP, ER visits for significant epistaxis)  Treatment: IV Iron - (venofer): May 2023 (, 5/15, , , ), 2023 (, , 10/6)  Labs: 2024: WBC: 14, Hgb: 10.3, MCV: 110, platelets: 387, WBC: 14 (ANC: 8500), TSAT: 10%, Ferritin: 24 (2023) to 49 (2023), 611 (2023), now 69,  folate : 14, B12: 586, MMA: 1.72, retic: 7.6, smear: unremarkable (moderate anisopoikilocytosis, leukocytosis w/ reactive changes), Cr: 0.8, GFR: 60, LFTs: wnl, KF.5, LFLC: 3.99, Ratio:  wnl, HIV and hepatitis testing: negative, haptoglobin: 264 (12/2023), retic: 4 (12/2023)     Interval History:    Ms. Floyd is unaccompanied today. She is able to do most ADLs on her own, can walk without assistance at home but does require a walker for long distances.    She is overall feeling well but does admit to:   (1) Epistaxis: She had a significant episode of epistaxis in 2/2023 (see ENT note on 2/22/23) requiring rhino rocket. Since then, her epistaxis has been mild, last episode on 3/23, lasted a few hours. No other clinical bleeding - denies GI bleeding, hematuria, vaginal bleeding etc. She was previously on PO iron but stopped last year due to constipation     Patient otherwise denies fatigue, fevers, chills, night sweats, headaches, chest pain, SOB, cough, abdominal pain, N/V, diarrhea, constipation, weight loss, rashes.     Review of Systems:  14-point review of systems was asked with the pertinent positives and/or negatives stated in HPI.     Past Medical History:   Nonobstructive CAD, HLD, HTN (HCTZ)/CKD, COPD/PH on TTE (on 2L home )2 after COVID in 11/2023), HFpEF, SAH (2/2 to ruptured aneurysm in 1999), Right Carotid artery stent for complication (iatrogenic rupture during cerebral angiography), Gout, R Rotator Cuff Tear, GERD,     Past Surgical History:   SAH due to ruptured aneurysm (1999 s/p clipping) c/b right carotid artery dissection requiring a stent, Left femur fracture after mechanical fall s/p L THR (5/2023)    Past Surgical History:   Procedure Laterality Date    ADENOIDECTOMY      ANTERIOR CRUCIATE LIGAMENT REPAIR  2012    APPENDECTOMY      CATARACT EXTRACTION W/  INTRAOCULAR LENS IMPLANT Right 11/07/2022    Procedure: EXTRACTION, CATARACT, WITH IOL INSERTION;  Surgeon: Higinio Cristina MD;  Location: [facility];  Service: Ophthalmology;  Laterality: Right;    CATARACT EXTRACTION W/  INTRAOCULAR LENS IMPLANT Left 11/21/2022    Procedure: EXTRACTION, CATARACT, WITH IOL INSERTION;  Surgeon:  Higinio Cristina MD;  Location: [facility];  Service: Ophthalmology;  Laterality: Left;    CEREBRAL ANEURYSM REPAIR  1999    clip    DENTAL SURGERY      EYE SURGERY Bilateral     cataract removal and lens implants    HIP ARTHROPLASTY Left 05/28/2023    Procedure: TOTAL HIP ARTHROPLASTY;  Surgeon: Juan Wan MD;  [address];  Service: Orthopedics;  Laterality: Left;    TONSILLECTOMY       Family History:   - No known family history of cancer     Social History:   - originally from the [place], 3 daughters (1 daughter in [facility], 1 in [place] and 1 in [place]) and 4 granddaughters   - previously lived in [facility] [place]   - Smoking History: Former, quit in her 50s, started in her 20s, about 2 cigarettes per day  - Alcohol: socially, red wine   - Illicit Drug Use: denies    reports that she quit smoking about 25 years ago. Her smoking use included cigarettes. She started smoking about 45 years ago. She has a 10.0 pack-year smoking history. She has been exposed to tobacco smoke. She has never used smokeless tobacco. She reports current alcohol use of about 7.0 standard drinks of alcohol per week. She reports that she does not use drugs.    Allergies:  Review of patient's allergies indicates:  No Known Allergies    Medications:  Current Outpatient Medications   Medication Sig Dispense Refill    acetaminophen (TYLENOL) 650 MG TbSR Take 1 tablet (650 mg total) by mouth every 6 to 8 hours as needed (pain (mild-moderate)). 120 tablet 0    albuterol-ipratropium (DUO-NEB) 2.5 mg-0.5 mg/3 mL nebulizer solution Take 3 mLs by nebulization every 6 (six) hours as needed for Wheezing. Rescue 90 mL 0    allopurinoL (ZYLOPRIM) 100 MG tablet Take 2 tablets (200 mg total) by mouth once daily. 180 tablet 3    aluminum & magnesium hydroxide-simethicone (MYLANTA MAX STRENGTH) 400-400-40 mg/5 mL suspension Take 30 mLs by mouth every 6 (six) hours as needed. 300 mL 0    amLODIPine (NORVASC) 5 MG tablet Take 1 tablet (5 mg total)  by mouth once daily. 90 tablet 3    aspirin (ECOTRIN) 81 MG EC tablet Take 81 mg by mouth once daily.      atorvastatin (LIPITOR) 80 MG tablet TAKE 1 TABLET BY MOUTH EVERY DAY 90 tablet 3    bacitracin 500 unit/gram ointment Apply topically 3 (three) times daily.  0    carvediloL (COREG) 6.25 MG tablet Take 1 tablet (6.25 mg total) by mouth 2 (two) times daily with meals. 180 tablet 3    celecoxib (CELEBREX) 200 MG capsule Take 1 capsule (200 mg total) by mouth as needed.      colchicine (COLCRYS) 0.6 mg tablet Take 1 tablet (0.6 mg total) by mouth once daily. As needed for gout 30 tablet 11    COMIRNATY 2023-24, 12Y UP,,PF, 30 mcg/0.3 mL inection       FLUAD QUAD 2023-24,65Y UP,,PF, 60 mcg (15 mcg x 4)/0.5 mL Syrg       fluticasone furoate-vilanteroL (BREO) 100-25 mcg/dose diskus inhaler Inhale 1 puff into the lungs once daily. Controller 30 each 11    fluticasone propionate (FLONASE) 50 mcg/actuation nasal spray SPRAY 2 pumps INTO EACH NOSTRIL ONCE DAILY 16 mL 3    folic acid (FOLVITE) 1 MG tablet Take 1 tablet (1 mg total) by mouth once daily. 90 tablet 3    furosemide (LASIX) 20 MG tablet Take 1 tablet (20 mg total) by mouth daily as needed (Leg swelling, SOB, Weight gain 3lb in 1 day or 5 lbs in 2 days.). 30 tablet 11    hydroCHLOROthiazide (HYDRODIURIL) 25 MG tablet Take 1 tablet (25 mg total) by mouth once daily. 90 tablet 3    melatonin (MELATIN) 3 mg tablet Take 2 tablets (6 mg total) by mouth nightly as needed for Insomnia.  0    montelukast (SINGULAIR) 10 mg tablet Take 1 tablet (10 mg total) by mouth every evening. 90 tablet 11    multivitamin (THERAGRAN) tablet Take 1 tablet by mouth once daily. 90 tablet 3    ORTHOVISC 30 mg/2 mL       pantoprazole (PROTONIX) 40 MG tablet Take 1 tablet (40 mg total) by mouth once daily. 90 tablet 0    prenatal no122/iron/folic acid (PRENATAL MULTI ORAL) Take 1 tablet by mouth once daily.      tiotropium bromide (SPIRIVA RESPIMAT) 2.5 mcg/actuation inhaler INHALE 2  "PUFFS INTO THE LUNGS ONCE DAILY. CONTROLLER 4 g 2    VENOFER 100 mg iron/5 mL injection       calcium carbonate (TUMS) 200 mg calcium (500 mg) chewable tablet Take 2 tablets (1,000 mg total) by mouth 2 (two) times daily as needed for Heartburn.      QUEtiapine (SEROQUEL) 50 MG tablet TAKE 1 TABLET BY MOUTH EVERY EVENING. 90 tablet 1     No current facility-administered medications for this visit.     Facility-Administered Medications Ordered in Other Visits   Medication Dose Route Frequency Provider Last Rate Last Admin    mupirocin 2 % ointment   Nasal On Call Procedure Kang Diaz MD   Given at 10/25/23 1045    tranexamic acid (CYKLOKAPRON) 1,000 mg in sodium chloride 0.9 % 100 mL IVPB (MB+)  1,000 mg Intravenous Once Kang Diaz MD        tranexamic acid (CYKLOKAPRON) 1,000 mg in sodium chloride 0.9 % 100 mL IVPB (MB+)  1,000 mg Intravenous Once Kang Diaz MD              Objective:   Vitals:   Vitals:    03/26/24 0943   BP: 119/64   BP Location: Left arm   Patient Position: Sitting   BP Method: Medium (Automatic)   Pulse: 82   Resp: 17   Temp: 98 °F (36.7 °C)   TempSrc: Oral   SpO2: 95%   Weight: 52.6 kg (115 lb 15.4 oz)   Height: 5' 3" (1.6 m)     BMI: Body mass index is 20.54 kg/m².    Physical Exam  ECOG Performance Status:    General Appearance:    Alert, cooperative, no distress    Head:    Normocephalic, without obvious abnormality, atraumatic   Throat:   Lips, mucosa, and tongue normal; teeth and gums normal   Neck:   Supple, symmetrical, no adenopathy;     thyroid:  no enlargement/tenderness/nodules   Lungs:     Clear to auscultation bilaterally, respirations unlabored    Heart:    Regular rate and rhythm, S1 and S2 normal   Abdomen:     Soft, non-tender, bowel sounds active, no masses, no organomegaly   Extremities:   Extremities normal, atraumatic, no cyanosis or edema   Pulses:   2+ and symmetric all extremities   Skin:   Skin color, texture, turgor normal, no rashes or lesions   Lymph " nodes:   Cervical, supraclavicular, and axillary nodes normal   Neurologic:   CNII-XII intact, normal strength, sensation     Laboratory Data:  Lab Visit on 03/26/2024   Component Date Value Ref Range Status    WBC 03/26/2024 12.81 (H)  3.90 - 12.70 K/uL Final    RBC 03/26/2024 3.05 (L)  4.00 - 5.40 M/uL Final    Hemoglobin 03/26/2024 10.0 (L)  12.0 - 16.0 g/dL Final    Hematocrit 03/26/2024 32.2 (L)  37.0 - 48.5 % Final    MCV 03/26/2024 106 (H)  82 - 98 fL Final    MCH 03/26/2024 32.8 (H)  27.0 - 31.0 pg Final    MCHC 03/26/2024 31.1 (L)  32.0 - 36.0 g/dL Final    RDW 03/26/2024 17.5 (H)  11.5 - 14.5 % Final    Platelets 03/26/2024 365  150 - 450 K/uL Final    MPV 03/26/2024 10.4  9.2 - 12.9 fL Final    Sodium 03/26/2024 136  136 - 145 mmol/L Final    Potassium 03/26/2024 3.0 (L)  3.5 - 5.1 mmol/L Final    Chloride 03/26/2024 94 (L)  95 - 110 mmol/L Final    CO2 03/26/2024 29  23 - 29 mmol/L Final    Glucose 03/26/2024 99  70 - 110 mg/dL Final    BUN 03/26/2024 32 (H)  8 - 23 mg/dL Final    Creatinine 03/26/2024 1.1  0.5 - 1.4 mg/dL Final    Calcium 03/26/2024 9.7  8.7 - 10.5 mg/dL Final    Total Protein 03/26/2024 7.8  6.0 - 8.4 g/dL Final    Albumin 03/26/2024 3.7  3.5 - 5.2 g/dL Final    Total Bilirubin 03/26/2024 0.6  0.1 - 1.0 mg/dL Final    Comment: For infants and newborns, interpretation of results should be based  on gestational age, weight and in agreement with clinical  observations.    Premature Infant recommended reference ranges:  Up to 24 hours.............<8.0 mg/dL  Up to 48 hours............<12.0 mg/dL  3-5 days..................<15.0 mg/dL  6-29 days.................<15.0 mg/dL      Alkaline Phosphatase 03/26/2024 78  55 - 135 U/L Final    AST 03/26/2024 32  10 - 40 U/L Final    ALT 03/26/2024 22  10 - 44 U/L Final    eGFR 03/26/2024 53 (A)  >60 mL/min/1.73 m^2 Final    Anion Gap 03/26/2024 13  8 - 16 mmol/L Final       Imaging:    10/31/2023: CT chest- small new cluster of RLL micronodules,  likely infectious/inflammatory     2023: CTA chest - evaluation limited due to motion artifact, patchy nonspecific GGOs involving lingula, RML and right lung base, may reflect infectious/inflammatory change     Assessment   Assessment and Plan:   Jessica Floyd is a 73 y.o. female with nonobstructive CAD, HTN/CKD3, COPD/PH, SAH ( to ruptured aneurysm in ), Right carotid artery stent (iatrogenic rupture during cerebral angiography, on aspirin), HFpEF (lasix). She is followed by Hematology for a 2 year history of macrocytic anemia, first identified in 10/2022. She was previously following with Dr. Martinez and presents today to transfer care.     # Macrocytic Anemia  # Iron Deficiency    # Vitamin B12 Deficiency    - 2024: WBC: 14, Hgb: 10.3, MCV: 110, platelets: 387, WBC: 14 (ANC: 8500), TSAT: 10%, Ferritin: 24 (2023) to 49 (2023), 611 (2023), now 69,  folate : 14, B12: 586, MMA: 1.72, smear: unremarkable (moderate anisopoikilocytosis, leukocytosis w/ reactive changes), Cr: 0.8, GFR: 60, LFTs: wnl, KF.5, LFLC: 3.99, Ratio: wnl, HIV and hepatitis testing: negative, haptoglobin: 264 (2023), retic: 4 (2023), LDH: 218    - Causes of Mrs. Powers anemia is likely multifactorial with a component of iron deficiency (TSAT: 10%, ferritin previously <40, last received IV iron in Oct 2023), anemia of chronic disease/inflammation (her anemia nadirs during COVID, influenza infections, etc), anemia of renal disease, vitamin b12 deficiency (MMA: 1.72, MCV >100), and possible early MDS based on age/elevated MCV.   - Iron deficiency source not entirely clear; does have significant bleeding with epistaxis, but has not had a severe episode since 2023. Will refer to GI for endoscopies (pt reports last colonoscopy was outside of [facility] in  and was wnl)  - Given degree of anemia with Hgb <10,  can consider IV iron infusion as below.     # Other Co-Morbidities  - B12 deficiency: obtain  pernicious anemia workup, messaged PCP for IM B12  - COPD: follow up with pulmonology, 2024   - Recent COVID and flu pneumonia: CT chest-10/31/23 - small new cluster of RLL micronodules, pt to follow up with pulmonary to discuss follow up imaging to ensure resolution (pulmonology and PCP messaged)  - Epistaxis (2023, presumably from dry nasal mucosa, requiring rhino rocket): per ENT  - Nonobstructive CAD, HTN (HCTZ)/CKD3, COPD/PH on TTE, SAH ( to ruptured aneurysm in ), Right Carotid artery stent for complication (iatrogenic rupture during cerebral angiography), HFpEF (lasix): stable on current regimen, per PCP  - Cancer Screening: Colonoscopy:  (reportedly wnl), Pap:  (Pap: wnl, HPV: neg), Lung cancer screening? (CT chest: 10/2023 - new micronodules in RLL likely inflammatory but should f/u to ensure resolution - see above), MM2022: wnl    To do:  - Labs today: CBC, CMP, B12, MMA, IF abs, gastrin   - PCP (IM B12), GI (scopes), Pulmonology (micronodules)  - RTC in 2024 with labs prior CBC, CMP, Peripheral Smear, Ferritin, Iron studies, Soluble Transferrin Receptor, Vit B12, MMA, folate, Copper, Zinc, SPEP/IF, FLC     # Treatment Plam  - Medication: IV Iron Sucrose    - Dose: Iron Sucrose or Venofer  200 mg IV x 5 doses over a 14 day period  - Monitoring: Baseline CBC/Iron Profile and CBC/Iron Profile in 6 to 8 weeks post infusion   - Side effects reviewed. Discussed IV iron is typically overall well tolerated but risks include and are not limited to hypersensitivity rxns, hypotension, GI toxicity, HA, myalgias, multisystem organ failure, infection, life threatening side effects including shock.      MDM includes:    - Acute or chronic illness or injury that poses a threat to life or bodily function  - Review of prior external notes from unique source  - Independent review and explanation of 3+ results from unique tests   - Discussion of management and ordering 3+ unique tests   -  Extensive discussion of treatment and management  - Prescription drug management  - Drug therapy requiring intensive monitoring for toxicity    - Overall, I discussed the diagnosis, history, stage, labs/imaging, prognosis, management, and treatment plan as applicable. I reviewed adverse short and long term effects as applicable.   - Informed patient if symptoms are getting worse that it is their responsibility to call the clinic and schedule follow up sooner than stated follow up. Also informed patient if they do not hear from the appointment center in 2-5 business days for their referrals, the patient must call the Oncology clinic so we can follow up on procedures or referral scheduling. Patient was fully informed of current medical plan, all questions were answered and patient verbalized understanding. No further questions.   - Face to Face Visit time I spent with the patient: 60 minutes of total time spent on the encounter, including counseling patient and/or coordinating care, which includes face to face time and non-face to face time preparing to see the patient (eg, review of tests), Obtaining and/or reviewing separately obtained history, Documenting clinical information in the electronic or other health record, Independently interpreting results (not separately reported) and communicating results to the patient/family/caregiver, or Care coordination (not separately reported).     Signed   Agata Rolon MD  [facility]

## 2024-03-26 NOTE — Clinical Note
Kishan Norman It looks like you saw Mrs. Floyd in Gi clinic recently. I met her in heme clinic for multifactorial anemia, she does remain iron deficient after stopping iv iron in oct 2023. Her last scopes were in oct 2023. I talked to her about it today and she's willing to go through an EGD/scope to ensure no GI source as a cause  I was hoping you could coordinate. Thanks!

## 2024-03-26 NOTE — TELEPHONE ENCOUNTER
Patient seen by me recently for dysphagia during hospital stay. Now with anemia. EGD and Colonoscopy ordered. Will reach out to patient to schedule.     Discussed risks versus benefits given the sensitivity to the prep solution limitations.  Patient understands risks and wishes to proceed with Miralax Dulcolax prep for colonoscopy.        ----- Message from Agata Rolon MD sent at 3/26/2024 11:55 AM CDT -----  Kishan Norman  It looks like you saw Mrs. Floyd in Gi clinic recently. I met her in heme clinic for multifactorial anemia, she does remain iron deficient after stopping iv iron in oct 2023. Her last scopes were in oct 2023. I talked to her about it today and she's willing to go through an EGD/scope to ensure no GI source as a cause    I was hoping you could coordinate. Thanks!

## 2024-03-26 NOTE — TELEPHONE ENCOUNTER
Refill Encounter    PCP Visits: Recent Visits  Date Type Provider Dept   11/27/23 Office Visit Effie Olmedo MD [facility]   Showing recent visits within past 360 days and meeting all other requirements  Future Appointments  No visits were found meeting these conditions.  Showing future appointments within next 720 days and meeting all other requirements     Last 3 Blood Pressure:   BP Readings from Last 3 Encounters:   03/05/24 130/85   03/05/24 122/68   02/28/24 113/67     Preferred Pharmacy:   [facility]/pharmacy #[id number] [place] [id number] [address]  Phone: [phone] Fax: [phone]    Requested RX:  Requested Prescriptions     Pending Prescriptions Disp Refills    QUEtiapine (SEROQUEL) 50 MG tablet [Pharmacy Med Name: QUETIAPINE FUMARATE 50 MG TAB] 90 tablet      Sig: TAKE 1 TABLET BY MOUTH EVERY EVENING.      RX Route: Normal

## 2024-03-26 NOTE — TELEPHONE ENCOUNTER
Please notify patient vitamin b12 medication ordered and sent to patients pharmacy. Please schedule patient for nursing visit to teach proper injection techniques after picking up medication and needles.

## 2024-03-26 NOTE — Clinical Note
Kishan Jacinto, I met Mrs. Floyd in heme clinic today for 2 yrs of multifactorial anemia. Her MMA is quite elevated at 1.7 suggesting b12 deficiency. I was hoping your team could start her on IM B12. Pt is agreeable to it  Thanks!

## 2024-03-27 ENCOUNTER — TELEPHONE (OUTPATIENT)
Dept: INTERNAL MEDICINE | Facility: CLINIC | Age: 74
End: 2024-03-27
Payer: MEDICARE

## 2024-03-27 LAB
ANNOTATION COMMENT IMP: NORMAL
IF BLOCK AB SER QL: NEGATIVE
VIT B12 SERPL-MCNC: 447 NG/L (ref 180–914)

## 2024-03-27 NOTE — TELEPHONE ENCOUNTER
LVM, asking Pt. to call Int. Med. clinic to schedule a Nurse Visit for B12 INJ administration & teaching.     [employer] message sent.

## 2024-03-28 ENCOUNTER — PATIENT MESSAGE (OUTPATIENT)
Dept: OTOLARYNGOLOGY | Facility: CLINIC | Age: 74
End: 2024-03-28
Payer: MEDICARE

## 2024-03-28 ENCOUNTER — PATIENT MESSAGE (OUTPATIENT)
Dept: PRIMARY CARE CLINIC | Facility: CLINIC | Age: 74
End: 2024-03-28
Payer: MEDICARE

## 2024-03-28 DIAGNOSIS — Z98.890 POST-OPERATIVE STATE: ICD-10-CM

## 2024-03-28 LAB — GASTRIN SERPL-MCNC: 142 PG/ML

## 2024-03-28 RX ORDER — CELECOXIB 200 MG/1
200 CAPSULE ORAL DAILY PRN
Qty: 30 CAPSULE | Refills: 2 | Status: SHIPPED | OUTPATIENT
Start: 2024-03-28

## 2024-03-28 NOTE — TELEPHONE ENCOUNTER
Orders Placed This Encounter    celecoxib (CELEBREX) 200 MG capsule     Dr. Makayla Stock D.O.   [facility]

## 2024-03-29 ENCOUNTER — HOSPITAL ENCOUNTER (EMERGENCY)
Facility: HOSPITAL | Age: 74
Discharge: HOME | End: 2024-03-29
Attending: EMERGENCY MEDICINE
Payer: MEDICARE

## 2024-03-29 VITALS
SYSTOLIC BLOOD PRESSURE: 136 MMHG | HEIGHT: 63 IN | DIASTOLIC BLOOD PRESSURE: 70 MMHG | HEART RATE: 88 BPM | OXYGEN SATURATION: 93 % | RESPIRATION RATE: 18 BRPM | TEMPERATURE: 99 F | WEIGHT: 116 LBS | BODY MASS INDEX: 20.55 KG/M2

## 2024-03-29 DIAGNOSIS — R04.0 EPISTAXIS: Primary | ICD-10-CM

## 2024-03-29 DIAGNOSIS — R04.0 RECURRENT EPISTAXIS: ICD-10-CM

## 2024-03-29 LAB
BASOPHILS # BLD AUTO: 0.16 K/UL (ref 0–0.2)
BASOPHILS NFR BLD: 0.7 % (ref 0–1.9)
DIFFERENTIAL METHOD BLD: ABNORMAL
EOSINOPHIL # BLD AUTO: 0.9 K/UL (ref 0–0.5)
EOSINOPHIL NFR BLD: 4 % (ref 0–8)
ERYTHROCYTE [DISTWIDTH] IN BLOOD BY AUTOMATED COUNT: 17.4 % (ref 11.5–14.5)
HCT VFR BLD AUTO: 31.8 % (ref 37–48.5)
HGB BLD-MCNC: 10.1 G/DL (ref 12–16)
IMM GRANULOCYTES # BLD AUTO: 0.42 K/UL (ref 0–0.04)
IMM GRANULOCYTES NFR BLD AUTO: 1.9 % (ref 0–0.5)
LYMPHOCYTES # BLD AUTO: 2.3 K/UL (ref 1–4.8)
LYMPHOCYTES NFR BLD: 10.4 % (ref 18–48)
MCH RBC QN AUTO: 33.6 PG (ref 27–31)
MCHC RBC AUTO-ENTMCNC: 31.8 G/DL (ref 32–36)
MCV RBC AUTO: 106 FL (ref 82–98)
MONOCYTES # BLD AUTO: 5.1 K/UL (ref 0.3–1)
MONOCYTES NFR BLD: 23.4 % (ref 4–15)
NEUTROPHILS # BLD AUTO: 13.1 K/UL (ref 1.8–7.7)
NEUTROPHILS NFR BLD: 59.6 % (ref 38–73)
NRBC BLD-RTO: 0 /100 WBC
PLATELET # BLD AUTO: 424 K/UL (ref 150–450)
PLATELET BLD QL SMEAR: ABNORMAL
PMV BLD AUTO: 10.9 FL (ref 9.2–12.9)
RBC # BLD AUTO: 3.01 M/UL (ref 4–5.4)
WBC # BLD AUTO: 21.93 K/UL (ref 3.9–12.7)

## 2024-03-29 PROCEDURE — 99282 EMERGENCY DEPT VISIT SF MDM: CPT | Mod: NTX

## 2024-03-29 PROCEDURE — 85025 COMPLETE CBC W/AUTO DIFF WBC: CPT | Mod: NTX

## 2024-03-29 NOTE — ED TRIAGE NOTES
Jessica Floyd, a 73 y.o. female presents to the ED w/ complaint of epistaxis. Pt recently had a nose bleed prior to arrival. Bleeding was controlled upon arrival, pt administered 3 puffs of afrin nose spray. Pt was told by ENT to come to the ER if she has another nose bleed for possible IR. Pt denies all other complaints at this time.     Triage note:  Chief Complaint   Patient presents with    Epistaxis     Resolved nose bleed now, used afrin and pressure, stopped hr ago, told by dr anatoly briceño at [facility] next time bleeds come to er and ask for interventional radiology      Review of patient's allergies indicates:  No Known Allergies  Past Medical History:   Diagnosis Date    Allergic rhinitis     Amblyopia     Carotid stenosis     Coronary atherosclerosis     [facility] 6/2014: 20% mLAD, 50% mCx, 20%, mRCA    Dyslipidemia     ESBL (extended spectrum beta-lactamase) producing bacteria infection     UTI    Hypertension     Nausea and vomiting 05/26/2017    Pulmonary emphysema 10/28/2023    SAH (subarachnoid hemorrhage) 08/24/2016 1999, s/p clipping    Subarachnoid hemorrhage due to ruptured aneurysm 1999

## 2024-03-29 NOTE — ED PROVIDER NOTES
"Encounter Date: 3/29/2024       History     Chief Complaint   Patient presents with    Epistaxis     Resolved nose bleed now, used afrin and pressure, stopped hr ago, told by dr ledbetter ent at [facility] next time bleeds come to er and ask for interventional radiology      73-year-old female with past medical history as below presents for evaluation of epistaxis occurring today.  Patient states that she was at home when she sneezed, after which she began to experience bleeding from her nose, right nare worse than left.  Patient used to prescribed Afrin spray 3 times and applied pressure at her nasal bridge for approximately 30 minutes with good control of her bleeding.  On arrival in ED, her bleeding has stopped.  Patient is unsure how much she bled, but thinks it was "a good amount." Patient has had recurrent nosebleeds over the past 2 years, most recently 3 or 4 days ago, and was recently evaluated by her ENT specialist, who recommended that patient go to the ED and ask for Interventional Radiology of the next time she experienced a nosebleed.  Patient reports she has significant medical anxiety; she currently reports right shoulder and left knee pain that are chronic as well as anxiety.  She denies chest pain, shortness of breath, headache, lightheadedness, weakness, nausea/vomiting, fevers/chills, any other symptoms at this time.    The history is provided by the patient, the spouse and medical records.     Review of patient's allergies indicates:  No Known Allergies  Past Medical History:   Diagnosis Date    Allergic rhinitis     Amblyopia     Carotid stenosis     Coronary atherosclerosis     Outside [facility] 6/2014: 20% mLAD, 50% mCx, 20%, mRCA    Dyslipidemia     ESBL (extended spectrum beta-lactamase) producing bacteria infection     UTI    Hypertension     Nausea and vomiting 05/26/2017    Pulmonary emphysema 10/28/2023    SAH (subarachnoid hemorrhage) 08/24/2016 1999, s/p clipping    Subarachnoid hemorrhage due " to ruptured aneurysm      Past Surgical History:   Procedure Laterality Date    ADENOIDECTOMY      ANTERIOR CRUCIATE LIGAMENT REPAIR      APPENDECTOMY      CATARACT EXTRACTION W/  INTRAOCULAR LENS IMPLANT Right 2022    Procedure: EXTRACTION, CATARACT, WITH IOL INSERTION;  Surgeon: Higinio Cristina MD;  Location: [facility];  Service: Ophthalmology;  Laterality: Right;    CATARACT EXTRACTION W/  INTRAOCULAR LENS IMPLANT Left 2022    Procedure: EXTRACTION, CATARACT, WITH IOL INSERTION;  Surgeon: Higinio Cristina MD;  Location: [facility];  Service: Ophthalmology;  Laterality: Left;    CEREBRAL ANEURYSM REPAIR      clip    DENTAL SURGERY      EYE SURGERY Bilateral     cataract removal and lens implants    HIP ARTHROPLASTY Left 2023    Procedure: TOTAL HIP ARTHROPLASTY;  Surgeon: Juan Wan MD;  Location: [address];  Service: Orthopedics;  Laterality: Left;    TONSILLECTOMY       Family History   Problem Relation Age of Onset    Hypertension Mother     Aneurysm Mother     Hypertension Father     Alcohol abuse Father     Kidney disease Brother     Heart disease Brother     Gout Brother     No Known Problems Daughter     No Known Problems Daughter     No Known Problems Daughter      Social History     Tobacco Use    Smoking status: Former     Current packs/day: 0.00     Average packs/day: 0.5 packs/day for 20.0 years (10.0 ttl pk-yrs)     Types: Cigarettes     Start date: 1979     Quit date: 1999     Years since quittin.2     Passive exposure: Past    Smokeless tobacco: Never   Substance Use Topics    Alcohol use: Yes     Alcohol/week: 7.0 standard drinks of alcohol     Types: 7 Glasses of wine per week     Comment: One glass of Red wine prior to dinner    Drug use: No         Physical Exam     Initial Vitals [24 1613]   BP Pulse Resp Temp SpO2   134/78 100 18 99.1 °F (37.3 °C) (!) 93 %      MAP       --         Physical Exam    Nursing note and vitals  reviewed.  Constitutional: She appears well-developed and well-nourished.   HENT:   Nose: No nose lacerations, sinus tenderness, nasal deformity or nasal septal hematoma. No epistaxis.  No foreign bodies.   Unremarkable nasal turbinates. No signs of trauma or bleeding on nasal exam.   Eyes: Conjunctivae and EOM are normal. Pupils are equal, round, and reactive to light.   No conjunctival rim pallor   Cardiovascular:    Tachycardia present.         No murmur heard.  Pulmonary/Chest: Effort normal and breath sounds normal.     Neurological: GCS score is 15. GCS eye subscore is 4. GCS verbal subscore is 5. GCS motor subscore is 6.   Skin: Skin is warm and dry. Capillary refill takes less than 2 seconds.         ED Course   Procedures  Labs Reviewed   CBC W/ AUTO DIFFERENTIAL - Abnormal; Notable for the following components:       Result Value    WBC 21.93 (*)     RBC 3.01 (*)     Hemoglobin 10.1 (*)     Hematocrit 31.8 (*)      (*)     MCH 33.6 (*)     MCHC 31.8 (*)     RDW 17.4 (*)     Immature Granulocytes 1.9 (*)     Gran # (ANC) 13.1 (*)     Immature Grans (Abs) 0.42 (*)     Mono # 5.1 (*)     Eos # 0.9 (*)     Lymph % 10.4 (*)     Mono % 23.4 (*)     All other components within normal limits          Imaging Results    None          Medications - No data to display  Medical Decision Making  73-year-old female with past medical history hypertension, pulmonary emphysema, subarachnoid hemorrhage, and recurrent epistaxis presents for evaluation of nosebleed occurring today and resolving with Afrin and pressure prior to arrival.    Pathologies I considered my differential diagnosis include, but are not limited to, trauma, coagulopathy, anemia, tumor, vascular malformation.    CBC showing mild anemia, unchanged from recent labs. No recurrence of bleeding while in Emergency Department. Patient with ENT follow up and good understanding of how to stop her nosebleeds. Provided referral to vascular surgery for  possible intervention. Patient is stable for discharge. Return precautions given. Patient and family are comfortable with plan. Answered all questions.    Amount and/or Complexity of Data Reviewed  Labs: ordered. Decision-making details documented in ED Course.               ED Course as of 03/30/24 0046   Fri Mar 29, 2024   1810 CBC auto differential(!)  Anemia, unchanged from 3 days and 3 weeks ago [BH]      ED Course User Index  [] Bj Mcmahon MD                           Clinical Impression:  Final diagnoses:  [R04.0] Epistaxis (Primary)  [R04.0] Recurrent epistaxis          ED Disposition Condition    Discharge Stable          ED Prescriptions    None       Follow-up Information       Follow up With Specialties Details Why Contact Info Additional Information    Effie Olmedo MD Family Medicine   [address]  [address]  [place] [address]  [phone]       [facility] Interventional Radiology   [address] [id number] [phone] [facility] [place] - 6th Floor Please park in [address] and use Clinic elevator             Bj Mcmahon MD  Resident  03/30/24 0046

## 2024-03-29 NOTE — DISCHARGE INSTRUCTIONS
You were evaluated for nosebleed.  Your lab work showed some mild anemia, but it was unchanged from your lab evaluations 3 days ago and also 3 weeks ago.    You have a referral to interventional radiology for further evaluation.  You should hear from soon, but if you do not hear from them within 3-5 days, you may call the number provided.    Follow up with your PCP within 3-5 days as needed.  It is also recommended that you follow-up with your ENT.    Return to the emergency department for any chest pain, dizziness or lightheadedness, shortness of breath, bleeding that does not stop with Afrin and pressure, any other new or concerning symptoms.

## 2024-04-01 ENCOUNTER — PATIENT MESSAGE (OUTPATIENT)
Dept: INTERNAL MEDICINE | Facility: CLINIC | Age: 74
End: 2024-04-01
Payer: MEDICARE

## 2024-04-01 ENCOUNTER — PATIENT OUTREACH (OUTPATIENT)
Dept: EMERGENCY MEDICINE | Facility: HOSPITAL | Age: 74
End: 2024-04-01
Payer: MEDICARE

## 2024-04-01 DIAGNOSIS — J84.9 INTERSTITIAL PULMONARY DISEASE, UNSPECIFIED: Primary | ICD-10-CM

## 2024-04-01 DIAGNOSIS — R04.0 HEMORRHAGE FROM NOSE: ICD-10-CM

## 2024-04-01 DIAGNOSIS — R04.0 EPISTAXIS: Primary | ICD-10-CM

## 2024-04-01 DIAGNOSIS — E53.8 B12 DEFICIENCY: ICD-10-CM

## 2024-04-02 ENCOUNTER — TELEPHONE (OUTPATIENT)
Dept: PRIMARY CARE CLINIC | Facility: CLINIC | Age: 74
End: 2024-04-02
Payer: MEDICARE

## 2024-04-02 DIAGNOSIS — R04.0 EPISTAXIS: Primary | ICD-10-CM

## 2024-04-02 RX ORDER — SYRINGE W-NEEDLE,DISPOSAB,3 ML 25GX5/8"
1 SYRINGE, EMPTY DISPOSABLE MISCELLANEOUS
Qty: 15 EACH | Refills: 0 | Status: SHIPPED | OUTPATIENT
Start: 2024-04-02

## 2024-04-02 NOTE — TELEPHONE ENCOUNTER
Re B12 shots  The nurse reminded us that we need syringes AND the needles. Better if there are in one piece already.  Thank you.  Jessica Jacinto,  I came to a clinic at [facility] to get a B12 shot. They said my  can give them to me. However the B12 RX did not have syringes. Please send [facility] an RX for at least 15 syringes so we can take the shots at home.  Thank you.   (I am getting my first shot at [facility] today.). I will need the syringes for starting tomorrow.

## 2024-04-02 NOTE — TELEPHONE ENCOUNTER
No care due was identified.  [facility] Embedded Care Due Messages. Reference number: [id number].   4/02/2024 1:33:38 PM CDT

## 2024-04-02 NOTE — TELEPHONE ENCOUNTER
Pt states that she is currently seeing an ENT doctor, but states they suggested that she get a referral from you to see interventional radiology.     Pended referral

## 2024-04-04 ENCOUNTER — TELEPHONE (OUTPATIENT)
Dept: INTERNAL MEDICINE | Facility: CLINIC | Age: 74
End: 2024-04-04
Payer: MEDICARE

## 2024-04-05 ENCOUNTER — PATIENT MESSAGE (OUTPATIENT)
Dept: OTOLARYNGOLOGY | Facility: CLINIC | Age: 74
End: 2024-04-05
Payer: MEDICARE

## 2024-04-05 DIAGNOSIS — J43.9 PULMONARY EMPHYSEMA, UNSPECIFIED EMPHYSEMA TYPE: Primary | Chronic | ICD-10-CM

## 2024-04-09 ENCOUNTER — TELEPHONE (OUTPATIENT)
Dept: OTOLARYNGOLOGY | Facility: CLINIC | Age: 74
End: 2024-04-09
Payer: MEDICARE

## 2024-04-09 DIAGNOSIS — J43.9 PULMONARY EMPHYSEMA, UNSPECIFIED EMPHYSEMA TYPE: Primary | Chronic | ICD-10-CM

## 2024-04-09 NOTE — TELEPHONE ENCOUNTER
Adina, Can you help her get in touch with Interventional Radiology please?  I have put 2 referrals in for her.

## 2024-04-09 NOTE — TELEPHONE ENCOUNTER
----- Message from Alpine LORETTA Mayes III, MD sent at 4/9/2024  8:29 AM CDT -----  Adina, Can you help her get in touch with Interventional Radiology please?  I have put 2 referrals in for her.

## 2024-04-10 DIAGNOSIS — R04.0 EPISTAXIS: Primary | ICD-10-CM

## 2024-04-16 DIAGNOSIS — R04.0 EPISTAXIS: Primary | ICD-10-CM

## 2024-04-17 ENCOUNTER — OFFICE VISIT (OUTPATIENT)
Dept: PSYCHIATRY | Facility: CLINIC | Age: 74
End: 2024-04-17
Payer: MEDICARE

## 2024-04-17 ENCOUNTER — EXTERNAL HOME HEALTH (OUTPATIENT)
Dept: HOME HEALTH SERVICES | Facility: HOSPITAL | Age: 74
End: 2024-04-17
Payer: MEDICARE

## 2024-04-17 VITALS — HEART RATE: 75 BPM | DIASTOLIC BLOOD PRESSURE: 61 MMHG | SYSTOLIC BLOOD PRESSURE: 113 MMHG

## 2024-04-17 DIAGNOSIS — F41.9 ANXIETY: Primary | ICD-10-CM

## 2024-04-17 DIAGNOSIS — F17.211 NICOTINE DEPENDENCE, CIGARETTES, IN REMISSION: ICD-10-CM

## 2024-04-17 DIAGNOSIS — I65.23 BILATERAL CAROTID ARTERY STENOSIS: ICD-10-CM

## 2024-04-17 DIAGNOSIS — Z87.81 HISTORY OF FRACTURE OF LEFT HIP: ICD-10-CM

## 2024-04-17 DIAGNOSIS — I25.10 CORONARY ARTERY DISEASE INVOLVING NATIVE CORONARY ARTERY OF NATIVE HEART WITHOUT ANGINA PECTORIS: Chronic | ICD-10-CM

## 2024-04-17 DIAGNOSIS — E05.90 SUBCLINICAL HYPERTHYROIDISM: ICD-10-CM

## 2024-04-17 DIAGNOSIS — R55 SITUATIONAL SYNCOPE: ICD-10-CM

## 2024-04-17 DIAGNOSIS — N18.31 CKD STAGE G3A/A1, GFR 45-59 AND ALBUMIN CREATININE RATIO <30 MG/G: ICD-10-CM

## 2024-04-17 DIAGNOSIS — J43.9 PULMONARY EMPHYSEMA, UNSPECIFIED EMPHYSEMA TYPE: Chronic | ICD-10-CM

## 2024-04-17 PROCEDURE — 3074F SYST BP LT 130 MM HG: CPT | Mod: CPTII,NTX,S$GLB, | Performed by: PSYCHIATRY & NEUROLOGY

## 2024-04-17 PROCEDURE — 3078F DIAST BP <80 MM HG: CPT | Mod: CPTII,NTX,S$GLB, | Performed by: PSYCHIATRY & NEUROLOGY

## 2024-04-17 PROCEDURE — 99999 PR PBB SHADOW E&M-EST. PATIENT-LVL III: CPT | Mod: PBBFAC,TXP,, | Performed by: PSYCHIATRY & NEUROLOGY

## 2024-04-17 PROCEDURE — 99417 PROLNG OP E/M EACH 15 MIN: CPT | Mod: NTX,S$GLB,, | Performed by: PSYCHIATRY & NEUROLOGY

## 2024-04-17 PROCEDURE — 99205 OFFICE O/P NEW HI 60 MIN: CPT | Mod: NTX,S$GLB,, | Performed by: PSYCHIATRY & NEUROLOGY

## 2024-04-17 RX ORDER — QUETIAPINE FUMARATE 50 MG/1
50 TABLET, FILM COATED ORAL NIGHTLY
Qty: 90 TABLET | Refills: 1 | Status: SHIPPED | OUTPATIENT
Start: 2024-04-17

## 2024-04-17 NOTE — PROGRESS NOTES
"  PSYCHIATRIC EVALUATION    Disclaimer: Evaluation and treatment is based on information presented to date. Any new information may affect assessment and findings.     Note:Face to Face time with patient: 90 minutes of total time spent on the encounter, which includes face to face time and non-face to face time preparing to see the patient including but not limited to: 1) Obtaining and/or reviewing separately obtained history, 2) Documenting clinical information in the electronic or other health record, 3) Independently  reviewing / interpreting results as clinically indicated ; 4) discussing medication options including risks and benefits as well as 5) recommendations  for therapeutic interventions and 5) where appropriate additional educational resources (ex: reference books / websites); 6) communicating assessment and plan of care to the patient/family/caregiver  7) explaining importance of keeping appts ; and 8) rescheduling IF miss appt  IF acute concerns to call 911 or go to nearest ER.     Name: Jessica Floyd  Age: 73 y.o. 1950  Date of Encounter: 4/17/2024    Sources of Information:  Patient  Interview and chart review     Chief Complaint:  "anxiety / in context having pulmonary issues "    Referred by: (Whose idea to come see Psychiatry) :  Pulmonologist  [facility]    History of Present Illness    Jessica Floyd a 73 y.o.  female  (5' 3" and 116 lbs) sittingin wheelchair.  stayed in wait area tho seems caring well intended.    Pt was  referred by [facility] pulmonologist: Alejandra WALLER.     She is a former smoker of 18 yrs (from 16y to 34y).    She has pulmonary emphysema; and reports getting heightened anxiety at times  (often assoc with her pulmonary status and such has has led to situational  reflex syncope.     Pt says such was originally diag in [place] yrs ago.    Says that she a had recent bout  in 12-  and [facility],    She does present as "worrier" / and " "seems to have anticipatory worry. .    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     She has Been  x 57y to [place] ("[place] HE was director prop [facility] for [facility]; did that 3 yrs    He retired and did facilities [facility] for credit aguirre ; based in  [place] 12y and then he went head [facility] ; so moved [facility] x 6yrs/ for a time he was also placed [place] x 3 yrs while she had moved to [place].    Says when gets stressed nervous; she says was called reflex syncope; says has had since diag in [facility] (2015).    Says when was put on seroquel  in Feb 2024 when in hospital  ; says has not had ANY reflex syncopal    Excerpt of 12-25-23  note of [facility]   Intensive care Hospitalist Earline Hernández MD    Assessment/Plan:      * Acute hypoxic respiratory failure  Patient with Hypoxic Respiratory failure which is Acute on chronic.  she is on home oxygen at 1L PRN LPM. Supplemental oxygen was provided and noted- Oxygen Concentration (%):  [30] 30 Signs/symptoms of respiratory failure include- tachypnea, increased work of breathing, respiratory distress, and wheezing. Contributing diagnoses includes - Aspiration, CHF, COPD, and Pneumonia Labs and images were reviewed. Patient Has not had a recent ABG. Will treat underlying causes and adjust management of respiratory failure as follows     73-year-old female with a past medical history of former smoking, CAD, R Carotid stent, dyslipidemia, HTN, gout, anxiety, prev SAH (1999) COPD on home O2 (1L PRN), mild pulmonary hypertension, stress induced reflex syncope, history of ESBL UTI,  now presenting with chief complaint of shortness of breath.  " "Patient reports that yesterday during the day she experienced worsening shortness of breath and cough.  She has had SOB for weeks, possibly months, and a lingering cough since her COVID infection last month but yesterday had an acute change. Rebel reyes she was not feeling well, and around 6 or 7 pm she called ambulence. After some SOB at home and 3L didn't help. Was observed by  to be satting 81 or 82.  Baseline is 94 or 95.  Patient denies any hemoptysis, fevers, or sick contacts. Admits to SAMPSON, productive gray cough, chest tightness with exertion improved with rest, leg swelling, poor exercise tolerance, and balance issues. Reports weight loss of 20-30 lbs since 6 months. Vaxed for flu rsv, pna, and covid. Follows with pulm OP. Recent hospitalization 1 month ago for Covid.   claims pt "does not walk at all" and gets SOB after walking to the restroom. Uses asistance from others or cane / walker to ambulate.      EMS reports that patient had saturation of 80% on room air requiring 3 L nasal cannula.  Albuterol neb was given by EMS in addition to 125 mg of methylprednisone. In the Ed at presentation her vitals were: /100 P116 RR22 98.5F O2% 93, Initally requiring Bipap, now satting 97 with 8L NC s/p breathing treatment. WBC 31.6 H &H 10.7 33.1  Na 133 K 2.9 CO2 20 Albumin 2.6  Trop WNL Lactate WNL Flu A+ PCO2 33 Po 26 PHCO3 21.7 EKG? Sinus Tach around 115, T wave inversions V3-V5, II, III, AVF, Xray: Patchy right basilar consolidation may relate to infection/pneumonia or aspiration.  Clinical correlation and continued imaging follow-up is recommended. Procal +.      Receive Rocephin, Azithro, Mag, and Steroids in ED. Admitted to [facility] for management of Acute Hypoxic Respiratory Failure.         Plan  Q4Hr Breathing treatments  CAP coverage with Ceftriaxone and Azithro  Tamiflu for influenza   Lasix for volume overload 2/2 HFpEF  Continue home inhalers  Prednisone 40 mg qd  Ween O2 as " needed        Weight loss  20-30lb weight loss, will ask nutrition to see pt. Lung Ct neg, will consider other cancer screening. Pt complains of early saiety, no GI symptoms. Dcreased appetite.      Sepsis  This patient does have evidence of infective focus  My overall impression is sepsis.  Source: Respiratory  Antibiotics given-   Antibiotics (72h ago, onward)        Start     Stop Route Frequency Ordered     12/26/23 0900   azithromycin tablet 500 mg         12/28/23 0859 Oral Daily 12/25/23 0910     12/26/23 0600   cefTRIAXone (ROCEPHIN) 1 g in dextrose 5 % in water (D5W) 100 mL IVPB (MB+)         -- IV Every 24 hours (non-standard times) 12/25/23 0910             Latest lactate reviewed-      Recent Labs   Lab 12/25/23  0655   LACTATE 0.9      Organ dysfunction indicated by Acute respiratory failure     Fluid challenge Not needed - patient is not hypotensive       Post- resuscitation assessment No - Post resuscitation assessment not needed         Will Not start Pressors- Levophed for MAP of 65  Source control achieved by: Abx     Ceftriaxone and Azithro and Tamiflu. Ua and blood ctx pending        Pulmonary emphysema  Patient's COPD is with exacerbation noted by continued dyspnea and worsening of baseline hypoxia currently.  Patient is currently off COPD Pathway. Continue scheduled inhalers Steroids, Antibiotics, and Supplemental oxygen and monitor respiratory status closely.      Gout  Continue home meds        Hypertension  Chronic, controlled. Latest blood pressure and vitals reviewed-      Temp:  [98.5 °F (36.9 °C)-98.6 °F (37 °C)]   Pulse:  []   Resp:  [19-27]   BP: (125-170)/()   SpO2:  [90 %-95 %] .   Home meds for hypertension were reviewed and noted below.   Hypertension Medications                    amLODIPine (NORVASC) 5 MG tablet TAKE 1 TABLET BY MOUTH EVERY DAY     carvediloL (COREG) 6.25 MG tablet Take 1 tablet (6.25 mg total) by mouth 2 (two) times daily with meals.      hydroCHLOROthiazide (HYDRODIURIL) 25 MG tablet Take 1 tablet (25 mg total) by mouth once daily.                While in the hospital, will manage blood pressure as follows; Adjust home antihypertensive regimen as follows- Continue Coreg, hold amlodipine and monitor bp in setting of sepsis with good BPs     Will utilize p.r.n. blood pressure medication only if patient's blood pressure greater than 180/110 and she develops symptoms such as worsening chest pain or shortness of breath.     Gastroesophageal reflux disease without esophagitis  Continue home meds     Dyslipidemia  Continue home meds     Coronary artery disease involving native coronary artery of native heart without angina pectoris  Patient with known CAD. Will continue Statin, ASA at home for primary prevention will cosnider holding. SAMPSON and some chest discomfort with exertion, will conisder stress test     >>          4/17/2024    12:19 PM   GAD7   1. Feeling nervous, anxious, or on edge? 1   2. Not being able to stop or control worrying? 2   3. Worrying too much about different things? 1   4. Trouble relaxing? 0   5. Being so restless that it is hard to sit still? 0   6. Becoming easily annoyed or irritable? 0   7. Feeling afraid as if something awful might happen? 1   8. If you checked off any problems, how difficult have these problems made it for you to do your work, take care of things at home, or get along with other people? 1   MELIA-7 Score 5             4/17/2024    12:21 PM 2/27/2024     2:02 PM 2/27/2024     2:01 PM 11/14/2023    10:51 AM 6/9/2023     7:22 AM 6/6/2023     4:35 PM 3/28/2023    11:46 AM   Depression Patient Health Questionnaire   Over the last two weeks how often have you been bothered by little interest or pleasure in doing things Not at all Not at all Not at all Not at all Not at all Not at all Not at all   Over the last two weeks how often have you been bothered by feeling down, depressed or hopeless Not at all Not at all Not  at all Not at all Not at all Not at all Not at all   PHQ-2 Total Score 0 0 0 0 0 0 0   Over the last two weeks how often have you been bothered by trouble falling or staying asleep, or sleeping too much Several days    Not at all Not at all    Over the last two weeks how often have you been bothered by feeling tired or having little energy Several days    Not at all Not at all    Over the last two weeks how often have you been bothered by a poor appetite or overeating Not at all    Not at all Not at all    Over the last two weeks how often have you been bothered by feeling bad about yourself - or that you are a failure or have let yourself or your family down Several days    Not at all Not at all    Over the last two weeks how often have you been bothered by trouble concentrating on things, such as reading the newspaper or watching television Not at all    Not at all Not at all    Over the last two weeks how often have you been bothered by moving or speaking so slowly that other people could have noticed. Or the opposite - being so fidgety or restless that you have been moving around a lot more than usual. Several days    Not at all Not at all    Over the last two weeks how often have you been bothered by thoughts that you would be better off dead, or of hurting yourself Not at all    Not at all Not at all    If you checked off any problems, how difficult have these problems made it for you to do your work, take care of things at home or get along with other people? Somewhat difficult         PHQ-9 Score 4    0 0    PHQ-9 Interpretation Minimal or None    Minimal or None Minimal or None      No psychosis    No SI no HI    No evidence of rekha    No substance use    Denies prior behavioral health treatment / says no counseling nor seeing psyc providers / did not santo on me      History:     Past Psychiatric History:     Previous psychiatric treatment and medication trials:  comes in on SEROQUEL 50 mg  Previous  psychiatric hospitalizations: No  Previous diagnoses: No  Previous suicide attempts: No    Abuse History:   Physical Abuse: No  Sexual Abuse: No  Other Abuse / Trauma : No    Substance Abuse History:  Use of alcohol:  one glass a day / no control issues  Tobacco use:  FORMER SMOKER  about 17yrs from 16y to about 34y / smoked LUPE:F pack a day  Cannabis: no  Recreational drugs:  none    Family History Mental Health:   Suicide :  No  Other:  No    Weapons: No    Psyc Meds:   Seroquel  50 mg at bed    Top 3 Stressors:  health pulmonary  (says we koffi)     Self Rating Scales:        4/17/2024    12:19 PM   GAD7   1. Feeling nervous, anxious, or on edge? 1   2. Not being able to stop or control worrying? 2   3. Worrying too much about different things? 1   4. Trouble relaxing? 0   5. Being so restless that it is hard to sit still? 0   6. Becoming easily annoyed or irritable? 0   7. Feeling afraid as if something awful might happen? 1   8. If you checked off any problems, how difficult have these problems made it for you to do your work, take care of things at home, or get along with other people? 1   MELIA-7 Score 5            4/17/2024    12:21 PM 2/27/2024     2:02 PM 2/27/2024     2:01 PM 11/14/2023    10:51 AM 6/9/2023     7:22 AM 6/6/2023     4:35 PM 3/28/2023    11:46 AM   Depression Patient Health Questionnaire   Over the last two weeks how often have you been bothered by little interest or pleasure in doing things Not at all Not at all Not at all Not at all Not at all Not at all Not at all   Over the last two weeks how often have you been bothered by feeling down, depressed or hopeless Not at all Not at all Not at all Not at all Not at all Not at all Not at all   PHQ-2 Total Score 0 0 0 0 0 0 0   Over the last two weeks how often have you been bothered by trouble falling or staying asleep, or sleeping too much Several days    Not at all Not at all    Over the last two weeks how often have you been bothered by  feeling tired or having little energy Several days    Not at all Not at all    Over the last two weeks how often have you been bothered by a poor appetite or overeating Not at all    Not at all Not at all    Over the last two weeks how often have you been bothered by feeling bad about yourself - or that you are a failure or have let yourself or your family down Several days    Not at all Not at all    Over the last two weeks how often have you been bothered by trouble concentrating on things, such as reading the newspaper or watching television Not at all    Not at all Not at all    Over the last two weeks how often have you been bothered by moving or speaking so slowly that other people could have noticed. Or the opposite - being so fidgety or restless that you have been moving around a lot more than usual. Several days    Not at all Not at all    Over the last two weeks how often have you been bothered by thoughts that you would be better off dead, or of hurting yourself Not at all    Not at all Not at all    If you checked off any problems, how difficult have these problems made it for you to do your work, take care of things at home or get along with other people? Somewhat difficult         PHQ-9 Score 4    0 0    PHQ-9 Interpretation Minimal or None    Minimal or None Minimal or None        Medical Review Of Systems:  Patient Active Problem List   Diagnosis    Coronary artery disease involving native coronary artery of native heart without angina pectoris    Dyslipidemia    Bilateral carotid artery stenosis    Gastroesophageal reflux disease without esophagitis    CKD stage G3a/A1, GFR 45-59 and albumin creatinine ratio <30 mg/g    Hypertension    Subclinical hyperthyroidism    Allergic rhinitis    Iron deficiency anemia secondary to blood loss (chronic)    History of fracture of left hip    Malnutrition of mild degree    Pathological fracture due to osteoporosis    Osteoporosis    Localized osteoporosis of  "[place]    Postnasal drip    Reduced vision-Left eye    Gout    Right rotator cuff tear arthropathy    SOB (shortness of breath)    Eosinophilia    Anxiety    PAC (premature atrial contraction)    Aortic atherosclerosis    COVID-19    Pulmonary emphysema    Weight loss    Moderate malnutrition    Influenza A    (HFpEF) heart failure with preserved ejection fraction    History Situational (ie Stress Induced) syncope ([facility] 12-25-23    Nicotine dependence, cigarettes, in FULL REMISSION:in past smoked for 17yrs from 16y to about 34y / LUPE:F pack a day        Cerebral aneurysm "CLIP in [place] 1998"  [facility]     Past Surgical History:   Procedure Laterality Date    ADENOIDECTOMY      ANTERIOR CRUCIATE LIGAMENT REPAIR  2012    APPENDECTOMY      CATARACT EXTRACTION W/  INTRAOCULAR LENS IMPLANT Right 11/07/2022    Procedure: EXTRACTION, CATARACT, WITH IOL INSERTION;  Surgeon: Higinio Cristina MD;  Location: [facility];  Service: Ophthalmology;  Laterality: Right;    CATARACT EXTRACTION W/  INTRAOCULAR LENS IMPLANT Left 11/21/2022    Procedure: EXTRACTION, CATARACT, WITH IOL INSERTION;  Surgeon: Higinio Cristina MD;  Location: [facility];  Service: Ophthalmology;  Laterality: Left;    CEREBRAL ANEURYSM REPAIR  1999    [facility]    DENTAL SURGERY      EYE SURGERY Bilateral     cataract removal and lens implants    HIP ARTHROPLASTY Left 05/28/2023    Procedure: TOTAL HIP ARTHROPLASTY;  Surgeon: Juan Wan MD;  Location: [address];  Service: Orthopedics;  Laterality: Left;    TONSILLECTOMY        Musculoskeletal : no tremors     History Seizures? Mini seizure and pass out / reflux syncope    History Head Injury?  N tho has clip for anneurysm    Current Evaluation:     Mental Status Exam:   Appearance: casual /  in wheelchair / can walk around house ok / just used today / get around quickly  Oriented: x 3 / including: Date: April 17 2024 ; and aware that [place] [facility] [place]  Attitude: " "cooperative engaging bit anxious   Eye Contact: good   Behavior: sits calm in wheel chair   Mood: says anxious"  Cognition: alert / 20-3: 'math has been my worst 17, xx14,xxx 11,xxx 8, xxx5 ,xx2  (x = approx 1 sec)  Concentration: grossly intact   Affect: appropriate range   Anxiety: moderate  Thought Process: goal directed   Speech: Volume : WNL   Quantity WNL   Quality: appears to openly answer questions   Eye Contact: good   Insight: fair to good   Threats: no SI / no HI   Memory: intact (as relay information across time spans) Pres?  BIDEN        Prior Pres? "The Vladislav" (Trump)  Psychosis: denies all   Estimate of Intellectual Function: average   Judgment (to simple situation): if saw a house on fire / A: call 911   Impulse Control: no history SI / nor HI ; calm here     3 wishes ?  Health / stamina      Current Outpatient Medications:     acetaminophen (TYLENOL) 650 MG TbSR, Take 1 tablet (650 mg total) by mouth every 6 to 8 hours as needed (pain (mild-moderate))., Disp: 120 tablet, Rfl: 0    albuterol-ipratropium (DUO-NEB) 2.5 mg-0.5 mg/3 mL nebulizer solution, Take 3 mLs by nebulization every 6 (six) hours as needed for Wheezing. Rescue, Disp: 90 mL, Rfl: 0    allopurinoL (ZYLOPRIM) 100 MG tablet, Take 2 tablets (200 mg total) by mouth once daily., Disp: 180 tablet, Rfl: 3    aluminum & magnesium hydroxide-simethicone (MYLANTA MAX STRENGTH) 400-400-40 mg/5 mL suspension, Take 30 mLs by mouth every 6 (six) hours as needed., Disp: 300 mL, Rfl: 0    amLODIPine (NORVASC) 5 MG tablet, Take 1 tablet (5 mg total) by mouth once daily., Disp: 90 tablet, Rfl: 3    aspirin (ECOTRIN) 81 MG EC tablet, Take 81 mg by mouth once daily., Disp: , Rfl:     atorvastatin (LIPITOR) 80 MG tablet, TAKE 1 TABLET BY MOUTH EVERY DAY, Disp: 90 tablet, Rfl: 3    bacitracin 500 unit/gram ointment, Apply topically 3 (three) times daily., Disp: , Rfl: 0    calcium carbonate (TUMS) 200 mg calcium (500 mg) chewable tablet, Take 2 tablets " "(1,000 mg total) by mouth 2 (two) times daily as needed for Heartburn., Disp: , Rfl:     carvediloL (COREG) 6.25 MG tablet, Take 1 tablet (6.25 mg total) by mouth 2 (two) times daily with meals., Disp: 180 tablet, Rfl: 3    celecoxib (CELEBREX) 200 MG capsule, Take 1 capsule (200 mg total) by mouth daily as needed for Pain., Disp: 30 capsule, Rfl: 2    colchicine (COLCRYS) 0.6 mg tablet, Take 1 tablet (0.6 mg total) by mouth once daily. As needed for gout, Disp: 30 tablet, Rfl: 11    COMIRNATY 2023-24, 12Y UP,,PF, 30 mcg/0.3 mL inection, , Disp: , Rfl:     cyanocobalamin 1,000 mcg/mL injection, Inject 1mL intramuscularly, once every day for the first week, then once weekly for 8 weeks., Disp: 30 mL, Rfl: 0    FLUAD QUAD 2023-24,65Y UP,,PF, 60 mcg (15 mcg x 4)/0.5 mL Syrg, , Disp: , Rfl:     fluticasone furoate-vilanteroL (BREO) 100-25 mcg/dose diskus inhaler, Inhale 1 puff into the lungs once daily. Controller, Disp: 30 each, Rfl: 11    fluticasone propionate (FLONASE) 50 mcg/actuation nasal spray, SPRAY 2 pumps INTO EACH NOSTRIL ONCE DAILY, Disp: 16 mL, Rfl: 3    folic acid (FOLVITE) 1 MG tablet, Take 1 tablet (1 mg total) by mouth once daily., Disp: 90 tablet, Rfl: 3    furosemide (LASIX) 20 MG tablet, Take 1 tablet (20 mg total) by mouth daily as needed (Leg swelling, SOB, Weight gain 3lb in 1 day or 5 lbs in 2 days.)., Disp: 30 tablet, Rfl: 11    hydroCHLOROthiazide (HYDRODIURIL) 25 MG tablet, Take 1 tablet (25 mg total) by mouth once daily., Disp: 90 tablet, Rfl: 3    melatonin (MELATIN) 3 mg tablet, Take 2 tablets (6 mg total) by mouth nightly as needed for Insomnia., Disp: , Rfl: 0    montelukast (SINGULAIR) 10 mg tablet, Take 1 tablet (10 mg total) by mouth every evening., Disp: 90 tablet, Rfl: 11    multivitamin (THERAGRAN) tablet, Take 1 tablet by mouth once daily., Disp: 90 tablet, Rfl: 3    needle, disp, 30 gauge 30 gauge x 1/2" Ndle, Use a new needle once, use new needle daily for the first week, then a " "new needle once weekly for 8 weeks., Disp: 30 each, Rfl: 0    ORTHOVISC 30 mg/2 mL, , Disp: , Rfl:     pantoprazole (PROTONIX) 40 MG tablet, Take 1 tablet (40 mg total) by mouth once daily., Disp: 90 tablet, Rfl: 0    prenatal no122/iron/folic acid (PRENATAL MULTI ORAL), Take 1 tablet by mouth once daily., Disp: , Rfl:     QUEtiapine (SEROQUEL) 50 MG tablet, Take 1 tablet (50 mg total) by mouth every evening., Disp: 90 tablet, Rfl: 1    syringe with needle (SYRINGE 3CC/22GX1") 3 mL 22 gauge x 1" Syrg, 1 mL by Misc.(Non-Drug; Combo Route) route every 30 days., Disp: 15 each, Rfl: 0    tiotropium bromide (SPIRIVA RESPIMAT) 2.5 mcg/actuation inhaler, INHALE 2 PUFFS INTO THE LUNGS ONCE DAILY. CONTROLLER, Disp: 4 g, Rfl: 2    VENOFER 100 mg iron/5 mL injection, , Disp: , Rfl:   No current facility-administered medications for this visit.    Facility-Administered Medications Ordered in Other Visits:     mupirocin 2 % ointment, , Nasal, On Call Procedure, Kang Diaz MD, Given at 10/25/23 1045    tranexamic acid (CYKLOKAPRON) 1,000 mg in sodium chloride 0.9 % 100 mL IVPB (MB+), 1,000 mg, Intravenous, Once, Kang Diaz MD    tranexamic acid (CYKLOKAPRON) 1,000 mg in sodium chloride 0.9 % 100 mL IVPB (MB+), 1,000 mg, Intravenous, Once, Kang Diaz MD     Psychosocial History :    City Born: [facility]    Siblings (full or half)  Brothers: 1 who passed 67y health issues  Sisters: none    Parents :    Briefly Describe  your Mom: hard working uneducated tho wished she could have been educated worked 2-3  / she was from [place]  Briefly Describe your Dad:  [employer]IC [employer] / he was from [place] / (pt sponsored him for citizen when she 17y ; never abusive ; he was kind loving    Bio Mom: Occupation:  /     Bio Dad:  Occupation: in [place]    Marital Status: one time / 57 yrs    Children   Girls  (ages): three  Hannah (54y / outside [place]/ psyc " ); Aby (50y [employer] 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 [place] [facility] [employer] [facility]; help a St[employer]il green [employer])    Anglican / Spiritual: raised Bahai  Religious / became unitarian /  born [place] / came to [place] to get educated ; he was going [facility] / hired to teach blind     Legal Issues? none  DWI ?n  alf time? n    Ever homeless? n    Employment:   Last Job?  As below  Longest Job? [employer] 22y (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  n      Assessment - Diagnosis - Goals:     Encounter Diagnoses   Name Primary?    Anxiety, unspecified, tho prominent generalized features (ie, worrier / chintan whenexacerbated by flare ups of  health conditions Yes    History Situational (ie Stress Anxiety Induced) REFLEX SYNCOPE syncope ([facility] 12[facility]25-23     Pulmonary emphysema, unspecified emphysema type     Subclinical hyperthyroidism     Coronary artery disease involving native coronary artery of native heart without angina pectoris     Nicotine dependence, cigarettes, in FULL REMISSION:in past smoked for 17yrs from 16y to about 34y / LUPE:F pack a day     Bilateral carotid artery stenosis     CKD stage G3a/A1, GFR 45-59 and albumin creatinine ratio <30 mg/g     History of fracture of left hip         Patient Instructions     PLAN:     Follow Up:   7/8/2024  2:30 PM ESTABLISHED PATIENT SHORT Spencer [facility] - [facility] 4th Fl Post, Danis RUBIO MD       We discussed her doing some counseling such as [facility] brief evidence based psychotherapy program OR [facility] (IOP) // as well as seeking indiv  counselor so as to attempt to have improved coping skills to manage her anxiety. MD encouraged her to call for more information 9as below       1)  [facility]enced[facility], [facility]: ([phone])   and ask to set up an intake eval    Website: ([email or web address]   (Copy and paste to your browser)     Current Conditions Treated:  PTSD  Insomnia  Panic Attacks  Depression  Anxiety  Chronic Pain  Stress    The [facility] is ideal for patients who:  Desire short-term rather than long-term psychotherapy  Want structured psychotherapy sessions for specific targets  Are willing to engage in daily practice assignments  Can commit to weekly psychotherapy for a time-limited period (usually between 6-12 weeks)    Note: Possibility of virtual  (telehealth) participation may exist    call for more information ([phone])     2) Individual Counseling:        A) Call [facility]              [phone] (there may be significant wait times)     B) Contact   your  insurance carrier:  via their Website or call and ask for assistance    C) Psychology Today: [email or web address]     Psyc Meds: renew Seroquel 50 mg at bed #90x1 / risk benefit discussed  and told read package insert and IF any questions to ask Psyc MD  / discussed  Remeron Paxil Buspar as possible add on: ONE /starting low dose / thjo for now mutually agree to leave on just seroquel / did discuss came in on that  / off label use THO WORKING / largely eliminated  spells she having     References:     Relaxation stress reduction workbook: ABBY Freeman PhD ( used: $7-10)    Feeling Good Website: Danis Hernandez MD / [email or web address] website (free) / chintan. PODCASTS    Anxiety &  phobia workbook by AHMET Oneal PhD  (web retailers: used: $ 7-10)    [place]: Path to Better Sleep : https://www.veterantraining.[place].gov/insomnia/ (free)     Pt expressed appreciation  for the visit today and did not have further question at this time though pt  was still informed to:     Call  if problems.    Call / Report Side Effects to Psyc MD     Encouraged to follow up with primary care / Gen Med MD for continued monitoring of general health and wellness.    Understanding was expressed; and no further concerns nor questions were raised at this time.     Remember healthy self care:   eat right  attempt adequate rest   HANDWASHING / encourage such chintan. During this corona virus time   walk or light exercise within reason and as your general med team approves  read or explore any of reference materials / homework mentioned  reach out (I.e.,  connect with)  others who nuture and bring out best in you  avoid risky behaviors    Keep your appointments:    IF you  cannot make your appt THEN please call [phone]  or go online (via My Chart pedro) to reschedule.    It is the responsibility of the patient to reschedule an appointment if an appointment has been canceled or missed.    Avoid  alcohol and illicit substances.  Look for the positive.  All is often relative-seek balance  Call sooner if needed : [phone]  Call 911 or go to Emergency Room  (ER)  if  any acute concerns

## 2024-04-17 NOTE — PATIENT INSTRUCTIONS
PLAN:     Follow Up:   7/8/2024  2:30 PM ESTABLISHED PATIENT YANIRA Grace - [facility] 4th Fl Post, Danis RUBIO MD       We discussed her doing some counseling such as [facility] OR [facility] ([facility]) // as well as seeking indiv counselor so as to attempt to have improved coping skills to manage her anxiety. MD encouraged her to call for more information 9as below       1)  [facility] program, [facility]: ([phone])   and ask to set up an intake eval    Website: ([email or web address]   (Copy and paste to your browser)     Current Conditions Treated:  PTSD  Insomnia  Panic Attacks  Depression  Anxiety  Chronic Pain  Stress    The [facility] is ideal for patients who:  Desire short-term rather than long-term psychotherapy  Want structured psychotherapy sessions for specific targets  Are willing to engage in daily practice assignments  Can commit to weekly psychotherapy for a time-limited period (usually between 6-12 weeks)    Note: Possibility of virtual  (telehealth) participation may exist    call for more information ([phone])     2) Individual Counseling:        A) Call [facility]              [phone] (there may be significant wait times)     B) Contact   your  insurance carrier:  via their Website or call and ask for assistance    C) Psychology Today: [email or web address]/us/therapists     Psyc Meds: renew Seroquel 50 mg at bed #90x1 / risk benefit discussed  and told read package insert and IF any questions to ask Psyc MD  / discussed  Remeron Paxil Buspar as possible add on: ONE /starting low dose / thjo for now mutually agree to leave on just seroquel / did discuss came in on that  / off label use THO WORKING / largely eliminated  spells she having     References:     Relaxation stress reduction workbook: ABBY Freeman PhD (web  retailer used: $7-10)    Feeling Good Website: Danis Hernandez MD / [email or web address] website (free) / chintan. PODCASTS    Anxiety &  phobia workbook by AHMET Oneal PhD  (web retailers: used: $ 7-10)    [place]: Path to Better Sleep : https://www.veterantraining.[place].gov/insomnia/ (free)     Pt expressed appreciation for the visit today and did not have further question at this time though pt  was still informed to:     Call  if problems.    Call / Report Side Effects to Psyc MD     Encouraged to follow up with primary care / Gen Med MD for continued monitoring of general health and wellness.    Understanding was expressed; and no further concerns nor questions were raised at this time.     Remember healthy self care:   eat right  attempt adequate rest   HANDWASHING / encourage such chintan. During this corona virus time   walk or light exercise within reason and as your general med team approves  read or explore any of reference materials / homework mentioned  reach out (I.e.,  connect with)  others who nuture and bring out best in you  avoid risky behaviors    Keep your appointments:    IF you  cannot make your appt THEN please call [phone]  or go online (via My Chart pedro) to reschedule.    It is the responsibility of the patient to reschedule an appointment if an appointment has been canceled or missed.    Avoid  alcohol and illicit substances.  Look for the positive.  All is often relative-seek balance  Call sooner if needed : [phone]  Call 361 or go to Emergency Room  (ER)  if  any acute concerns

## 2024-04-20 PROBLEM — F17.211 NICOTINE DEPENDENCE, CIGARETTES, IN REMISSION: Status: ACTIVE | Noted: 2024-04-20

## 2024-04-20 PROBLEM — R55 SITUATIONAL SYNCOPE: Status: ACTIVE | Noted: 2024-04-20

## 2024-04-26 ENCOUNTER — PATIENT MESSAGE (OUTPATIENT)
Dept: INTERNAL MEDICINE | Facility: CLINIC | Age: 74
End: 2024-04-26
Payer: MEDICARE

## 2024-04-26 DIAGNOSIS — E53.8 B12 DEFICIENCY: Primary | ICD-10-CM

## 2024-04-29 RX ORDER — CYANOCOBALAMIN 1000 UG/ML
INJECTION, SOLUTION INTRAMUSCULAR; SUBCUTANEOUS
Qty: 30 ML | Refills: 0 | Status: SHIPPED | OUTPATIENT
Start: 2024-04-29

## 2024-05-01 DIAGNOSIS — R06.02 SOB (SHORTNESS OF BREATH) ON EXERTION: ICD-10-CM

## 2024-05-01 RX ORDER — TIOTROPIUM BROMIDE INHALATION SPRAY 3.12 UG/1
2 SPRAY, METERED RESPIRATORY (INHALATION)
Qty: 4 G | Refills: 2 | Status: SHIPPED | OUTPATIENT
Start: 2024-05-01 | End: 2024-06-11 | Stop reason: SDUPTHER

## 2024-05-01 NOTE — TELEPHONE ENCOUNTER
Refill Encounter    PCP Visits: Recent Visits  Date Type Provider Dept   11/27/23 Office Visit Effie Olmedo MD [facility] Primary Care   Showing recent visits within past 360 days and meeting all other requirements  Future Appointments  No visits were found meeting these conditions.  Showing future appointments within next 720 days and meeting all other requirements     Last 3 Blood Pressure:   BP Readings from Last 3 Encounters:   03/29/24 136/70   03/26/24 119/64   03/05/24 130/85     Preferred Pharmacy:   [facility]/pharmacy #[id number] [place], [place] - [address] [address]  Phone: [phone] Fax: [phone]      Requested RX:  Requested Prescriptions     Pending Prescriptions Disp Refills    SPIRIVA RESPIMAT 2.5 mcg/actuation inhaler [Pharmacy Med Name: SPIRIVA RESPIMAT 2.5 MCG INH]  2     Sig: INHALE 2 PUFFS INTO THE LUNGS ONCE DAILY. CONTROLLER      RX Route: Normal

## 2024-05-03 NOTE — TELEPHONE ENCOUNTER
Refill Encounter    PCP Visits: Recent Visits  Date Type Provider Dept   11/27/23 Office Visit Effie Olmedo MD [facility] Primary Care   Showing recent visits within past 360 days and meeting all other requirements  Future Appointments  No visits were found meeting these conditions.  Showing future appointments within next 720 days and meeting all other requirements     Last 3 Blood Pressure:   BP Readings from Last 3 Encounters:   03/29/24 136/70   03/26/24 119/64   03/05/24 130/85     Preferred Pharmacy:   [facility]/pharmacy #[id number] [place], [place] - [address] [address]  Phone: [phone] Fax: [phone]      Requested RX:  Requested Prescriptions     Pending Prescriptions Disp Refills    montelukast (SINGULAIR) 10 mg tablet [Pharmacy Med Name: MONTELUKAST SOD 10 MG TABLET] 90 tablet 6     Sig: TAKE 1 TABLET BY MOUTH EVERY DAY IN THE EVENING      RX Route: Normal

## 2024-05-06 RX ORDER — MONTELUKAST SODIUM 10 MG/1
10 TABLET ORAL NIGHTLY
Qty: 90 TABLET | Refills: 6 | Status: SHIPPED | OUTPATIENT
Start: 2024-05-06

## 2024-05-10 ENCOUNTER — INFUSION (OUTPATIENT)
Dept: INFUSION THERAPY | Facility: OTHER | Age: 74
End: 2024-05-10
Attending: FAMILY MEDICINE
Payer: MEDICARE

## 2024-05-10 VITALS
DIASTOLIC BLOOD PRESSURE: 63 MMHG | RESPIRATION RATE: 16 BRPM | OXYGEN SATURATION: 94 % | SYSTOLIC BLOOD PRESSURE: 108 MMHG | HEART RATE: 88 BPM

## 2024-05-10 DIAGNOSIS — D50.0 IRON DEFICIENCY ANEMIA SECONDARY TO BLOOD LOSS (CHRONIC): Primary | ICD-10-CM

## 2024-05-10 PROCEDURE — 96361 HYDRATE IV INFUSION ADD-ON: CPT

## 2024-05-10 PROCEDURE — 96366 THER/PROPH/DIAG IV INF ADDON: CPT

## 2024-05-10 PROCEDURE — 96365 THER/PROPH/DIAG IV INF INIT: CPT

## 2024-05-10 PROCEDURE — 63600175 PHARM REV CODE 636 W HCPCS: Performed by: INTERNAL MEDICINE

## 2024-05-10 PROCEDURE — 25000003 PHARM REV CODE 250: Performed by: INTERNAL MEDICINE

## 2024-05-10 RX ORDER — EPINEPHRINE 0.3 MG/.3ML
0.3 INJECTION SUBCUTANEOUS ONCE AS NEEDED
Status: DISCONTINUED | OUTPATIENT
Start: 2024-05-10 | End: 2024-05-10 | Stop reason: HOSPADM

## 2024-05-10 RX ORDER — HEPARIN 100 UNIT/ML
500 SYRINGE INTRAVENOUS
Status: DISCONTINUED | OUTPATIENT
Start: 2024-05-10 | End: 2024-05-10 | Stop reason: HOSPADM

## 2024-05-10 RX ORDER — SODIUM CHLORIDE 0.9 % (FLUSH) 0.9 %
10 SYRINGE (ML) INJECTION
Status: DISCONTINUED | OUTPATIENT
Start: 2024-05-10 | End: 2024-05-10 | Stop reason: HOSPADM

## 2024-05-10 RX ORDER — METHYLPREDNISOLONE SOD SUCC 125 MG
125 VIAL (EA) INJECTION ONCE AS NEEDED
Status: DISCONTINUED | OUTPATIENT
Start: 2024-05-10 | End: 2024-05-10 | Stop reason: HOSPADM

## 2024-05-10 RX ORDER — DIPHENHYDRAMINE HYDROCHLORIDE 50 MG/ML
50 INJECTION INTRAMUSCULAR; INTRAVENOUS ONCE AS NEEDED
Status: DISCONTINUED | OUTPATIENT
Start: 2024-05-10 | End: 2024-05-10 | Stop reason: HOSPADM

## 2024-05-10 RX ADMIN — SODIUM CHLORIDE 1000 ML: 9 INJECTION, SOLUTION INTRAVENOUS at 10:05

## 2024-05-10 RX ADMIN — IRON SUCROSE 300 MG: 20 INJECTION, SOLUTION INTRAVENOUS at 10:05

## 2024-05-10 NOTE — PLAN OF CARE
Problem: Anemia  Goal: Anemia Symptom Improvement  Outcome: Progressing     Problem: Adult Inpatient Plan of Care  Goal: Plan of Care Review  Outcome: Progressing       Patient presented today for iv venofer & normal saline infusions. PIV started to right hand and infusion given with no issues. VS remained stable throughout visit and assessment provided no issues. PIV removed w/o complications, all questions answered and left clinic via wheelchair in no acute distress.

## 2024-05-13 ENCOUNTER — PATIENT OUTREACH (OUTPATIENT)
Dept: ADMINISTRATIVE | Facility: HOSPITAL | Age: 74
End: 2024-05-13
Payer: MEDICARE

## 2024-05-13 ENCOUNTER — OFFICE VISIT (OUTPATIENT)
Dept: INTERNAL MEDICINE | Facility: CLINIC | Age: 74
End: 2024-05-13
Payer: MEDICARE

## 2024-05-13 VITALS
OXYGEN SATURATION: 95 % | DIASTOLIC BLOOD PRESSURE: 72 MMHG | WEIGHT: 115.94 LBS | HEIGHT: 63 IN | HEART RATE: 75 BPM | BODY MASS INDEX: 20.54 KG/M2 | SYSTOLIC BLOOD PRESSURE: 128 MMHG

## 2024-05-13 DIAGNOSIS — J30.9 ALLERGIC RHINITIS, UNSPECIFIED SEASONALITY, UNSPECIFIED TRIGGER: Primary | ICD-10-CM

## 2024-05-13 PROCEDURE — 99999 PR PBB SHADOW E&M-EST. PATIENT-LVL V: CPT | Mod: PBBFAC,,, | Performed by: STUDENT IN AN ORGANIZED HEALTH CARE EDUCATION/TRAINING PROGRAM

## 2024-05-13 PROCEDURE — 3288F FALL RISK ASSESSMENT DOCD: CPT | Mod: CPTII,S$GLB,, | Performed by: STUDENT IN AN ORGANIZED HEALTH CARE EDUCATION/TRAINING PROGRAM

## 2024-05-13 PROCEDURE — 3078F DIAST BP <80 MM HG: CPT | Mod: CPTII,S$GLB,, | Performed by: STUDENT IN AN ORGANIZED HEALTH CARE EDUCATION/TRAINING PROGRAM

## 2024-05-13 PROCEDURE — 1159F MED LIST DOCD IN RCRD: CPT | Mod: CPTII,S$GLB,, | Performed by: STUDENT IN AN ORGANIZED HEALTH CARE EDUCATION/TRAINING PROGRAM

## 2024-05-13 PROCEDURE — 1125F AMNT PAIN NOTED PAIN PRSNT: CPT | Mod: CPTII,S$GLB,, | Performed by: STUDENT IN AN ORGANIZED HEALTH CARE EDUCATION/TRAINING PROGRAM

## 2024-05-13 PROCEDURE — 3074F SYST BP LT 130 MM HG: CPT | Mod: CPTII,S$GLB,, | Performed by: STUDENT IN AN ORGANIZED HEALTH CARE EDUCATION/TRAINING PROGRAM

## 2024-05-13 PROCEDURE — 1101F PT FALLS ASSESS-DOCD LE1/YR: CPT | Mod: CPTII,S$GLB,, | Performed by: STUDENT IN AN ORGANIZED HEALTH CARE EDUCATION/TRAINING PROGRAM

## 2024-05-13 PROCEDURE — 99213 OFFICE O/P EST LOW 20 MIN: CPT | Mod: S$GLB,,, | Performed by: STUDENT IN AN ORGANIZED HEALTH CARE EDUCATION/TRAINING PROGRAM

## 2024-05-13 RX ORDER — FLUTICASONE PROPIONATE 50 MCG
SPRAY, SUSPENSION (ML) NASAL
Qty: 16 ML | Refills: 3 | Status: SHIPPED | OUTPATIENT
Start: 2024-05-13

## 2024-05-13 NOTE — PROGRESS NOTES
Health Maintenance Due   Topic Date Due    Mammogram  06/15/2024   Health Maintenance reviewed, updated and links triggered. Mammogram due. Portal message sent for   Scheduling. ([place]) 5/13/24

## 2024-05-13 NOTE — PROGRESS NOTES
Subjective:       Patient ID: Jessica Floyd is a 73 y.o. female.    Chief Complaint: Nasal Congestion and Otalgia (right)    HPI  Here for nasal congestion. She notes she has chronic nasal congestion for many years. She notes symptoms started worsening a bit last week. She notes using saline more frequently, which helps with the congestion. She continues on singulair. She has been without her flonase for the last 2 weeks. She uses afrin prn if she has epistaxis with clearing her nares. She does not use frequently.     #Not addressed today.  HTN-current medication(s) include norvasc 5mg, HCTZ 25mg daily, coreg 6.25mg daily. Denies HA, vision changes, CP, SOB.     Aortic atherosclerosis, CAD/HLD-Currently on ASA and lipitor 80mg daily. The 10-year ASCVD risk score (Joyce GONZALEZ, et al., 2019) is: 18.6%    Values used to calculate the score:      Age: 73 years      Sex: Female      Is Non- : No      Diabetic: No      Tobacco smoker: No      Systolic Blood Pressure: 128 mmHg      Is BP treated: Yes      HDL Cholesterol: 69 mg/dL      Total Cholesterol: 176 mg/dL . Issues with medication none.     Anxiety- she takes seroquel 50mg BID. Seroquel is helping with controlling her anxiety symptoms. She has established with psychiatry.    Emphysema-she is seeing pulm. She is on Breo and spiriva for this. Currently denies SOB, dyspnea. She has home oxygen, which she uses prn.     Allergic rhinits-she takes singulair and flonase daily for this.     GERD-mainly asymptomatic at home. She has seen GI for this. Has known food triggers, is directed to take protonix prn. Denies dysphagia, odynophagia, changes in BM, stools. Per GI, if symptoms recurs consider esophagram with barium tablet vs EGD.    Hx of Gout-takes allopurinol and colchicine daily. She has not had issues with this.    Multifactorial anemia-It is suspected due to Macrocytic anemia and ERIK, possible early MDS -she has received iron infusions. She is  on IM B12 injections. She is pending eval with GI for need for scope.  All of your core healthy metrics are met.      Social History     Social History Narrative    .  Has 3 grown daughters.         Family History   Problem Relation Name Age of Onset    Hypertension Mother      Aneurysm Mother      Hypertension Father      Alcohol abuse Father      Kidney disease Brother      Heart disease Brother      Gout Brother      No Known Problems Daughter Hannah     No Known Problems Daughter Aby     No Known Problems Daughter Diya        Current Outpatient Medications:     acetaminophen (TYLENOL) 650 MG TbSR, Take 1 tablet (650 mg total) by mouth every 6 to 8 hours as needed (pain (mild-moderate))., Disp: 120 tablet, Rfl: 0    albuterol-ipratropium (DUO-NEB) 2.5 mg-0.5 mg/3 mL nebulizer solution, Take 3 mLs by nebulization every 6 (six) hours as needed for Wheezing. Rescue, Disp: 90 mL, Rfl: 0    allopurinoL (ZYLOPRIM) 100 MG tablet, Take 2 tablets (200 mg total) by mouth once daily., Disp: 180 tablet, Rfl: 3    aluminum & magnesium hydroxide-simethicone (MYLANTA MAX STRENGTH) 400-400-40 mg/5 mL suspension, Take 30 mLs by mouth every 6 (six) hours as needed., Disp: 300 mL, Rfl: 0    amLODIPine (NORVASC) 5 MG tablet, Take 1 tablet (5 mg total) by mouth once daily., Disp: 90 tablet, Rfl: 3    aspirin (ECOTRIN) 81 MG EC tablet, Take 81 mg by mouth once daily., Disp: , Rfl:     atorvastatin (LIPITOR) 80 MG tablet, TAKE 1 TABLET BY MOUTH EVERY DAY, Disp: 90 tablet, Rfl: 3    bacitracin 500 unit/gram ointment, Apply topically 3 (three) times daily., Disp: , Rfl: 0    carvediloL (COREG) 6.25 MG tablet, Take 1 tablet (6.25 mg total) by mouth 2 (two) times daily with meals., Disp: 180 tablet, Rfl: 3    celecoxib (CELEBREX) 200 MG capsule, Take 1 capsule (200 mg total) by mouth daily as needed for Pain., Disp: 30 capsule, Rfl: 2    colchicine (COLCRYS) 0.6 mg tablet, Take 1 tablet (0.6 mg total) by mouth once daily. As  "needed for gout, Disp: 30 tablet, Rfl: 11    COMIRNATY 2023-24, 12Y UP,,PF, 30 mcg/0.3 mL inection, , Disp: , Rfl:     cyanocobalamin 1,000 mcg/mL injection, Inject 1mL intramuscularly once weekly., Disp: 30 mL, Rfl: 0    FLUAD QUAD 2023-24,65Y UP,,PF, 60 mcg (15 mcg x 4)/0.5 mL Syrg, , Disp: , Rfl:     fluticasone furoate-vilanteroL (BREO) 100-25 mcg/dose diskus inhaler, Inhale 1 puff into the lungs once daily. Controller, Disp: 30 each, Rfl: 11    fluticasone propionate (FLONASE) 50 mcg/actuation nasal spray, SPRAY 2 pumps INTO EACH NOSTRIL ONCE DAILY, Disp: 16 mL, Rfl: 3    folic acid (FOLVITE) 1 MG tablet, Take 1 tablet (1 mg total) by mouth once daily., Disp: 90 tablet, Rfl: 3    furosemide (LASIX) 20 MG tablet, Take 1 tablet (20 mg total) by mouth daily as needed (Leg swelling, SOB, Weight gain 3lb in 1 day or 5 lbs in 2 days.)., Disp: 30 tablet, Rfl: 11    hydroCHLOROthiazide (HYDRODIURIL) 25 MG tablet, Take 1 tablet (25 mg total) by mouth once daily., Disp: 90 tablet, Rfl: 3    melatonin (MELATIN) 3 mg tablet, Take 2 tablets (6 mg total) by mouth nightly as needed for Insomnia., Disp: , Rfl: 0    montelukast (SINGULAIR) 10 mg tablet, TAKE 1 TABLET BY MOUTH EVERY DAY IN THE EVENING, Disp: 90 tablet, Rfl: 6    multivitamin (THERAGRAN) tablet, Take 1 tablet by mouth once daily., Disp: 90 tablet, Rfl: 3    needle, disp, 30 gauge 30 gauge x 1/2" Ndle, Use a new needle once, use new needle daily for the first week, then a new needle once weekly for 8 weeks., Disp: 30 each, Rfl: 0    ORTHOVISC 30 mg/2 mL, , Disp: , Rfl:     pantoprazole (PROTONIX) 40 MG tablet, Take 1 tablet (40 mg total) by mouth once daily., Disp: 90 tablet, Rfl: 0    prenatal no122/iron/folic acid (PRENATAL MULTI ORAL), Take 1 tablet by mouth once daily., Disp: , Rfl:     QUEtiapine (SEROQUEL) 50 MG tablet, Take 1 tablet (50 mg total) by mouth every evening., Disp: 90 tablet, Rfl: 1    syringe with needle (SYRINGE 3CC/22GX1") 3 mL 22 gauge x 1" " "Syrg, 1 mL by Misc.(Non-Drug; Combo Route) route every 30 days., Disp: 15 each, Rfl: 0    tiotropium bromide (SPIRIVA RESPIMAT) 2.5 mcg/actuation inhaler, INHALE 2 PUFFS INTO THE LUNGS ONCE DAILY. CONTROLLER, Disp: 4 g, Rfl: 2    VENOFER 100 mg iron/5 mL injection, , Disp: , Rfl:     calcium carbonate (TUMS) 200 mg calcium (500 mg) chewable tablet, Take 2 tablets (1,000 mg total) by mouth 2 (two) times daily as needed for Heartburn., Disp: , Rfl:   No current facility-administered medications for this visit.    Facility-Administered Medications Ordered in Other Visits:     mupirocin 2 % ointment, , Nasal, On Call Procedure, Kang Diaz MD, Given at 10/25/23 1045    tranexamic acid (CYKLOKAPRON) 1,000 mg in sodium chloride 0.9 % 100 mL IVPB (MB+), 1,000 mg, Intravenous, Once, Kang Diaz MD    tranexamic acid (CYKLOKAPRON) 1,000 mg in sodium chloride 0.9 % 100 mL IVPB (MB+), 1,000 mg, Intravenous, Once, Kang Diaz MD    Review of Systems   Constitutional:  Negative for chills and fever.   HENT:  Positive for congestion, postnasal drip and rhinorrhea. Negative for ear pain and sore throat.         Pressure sensation in right ear. Throat irritation with post nasal drip     Respiratory:  Negative for cough, chest tightness and shortness of breath.    Cardiovascular:  Negative for chest pain and palpitations.   Gastrointestinal:  Negative for nausea and vomiting.   Musculoskeletal:  Negative for gait problem.   Neurological:  Negative for weakness.   Psychiatric/Behavioral:  Negative for behavioral problems.        Objective:   /72 (BP Location: Right arm, Patient Position: Sitting, BP Method: Small (Manual))   Pulse 75   Ht 5' 3" (1.6 m)   Wt 52.6 kg (115 lb 15.4 oz)   SpO2 95%   BMI 20.54 kg/m²      Physical Exam  Vitals and nursing note reviewed.   Constitutional:       General: She is not in acute distress.     Appearance: Normal appearance. She is not ill-appearing, toxic-appearing or " diaphoretic.   HENT:      Right Ear: Tympanic membrane, ear canal and external ear normal.      Left Ear: External ear normal.      Ears:      Comments: Cerumen obstructing L TM.     Nose: Nose normal.      Mouth/Throat:      Mouth: Mucous membranes are moist.      Pharynx: Oropharynx is clear.   Eyes:      General:         Right eye: No discharge.         Left eye: No discharge.      Conjunctiva/sclera: Conjunctivae normal.   Cardiovascular:      Rate and Rhythm: Normal rate and regular rhythm.   Pulmonary:      Effort: Pulmonary effort is normal. No respiratory distress.      Breath sounds: Normal breath sounds. No wheezing.   Neurological:      Mental Status: She is alert.   Psychiatric:         Behavior: Behavior normal.         Assessment & Plan   1. Allergic rhinitis, unspecified seasonality, unspecified trigger  -has been without Flonase which she was using daily previously.  Refilled today.  Her symptoms are consistent with her previous chronic allergic rhinitis.  I have discussed with her continued nasal saline cleanse as she is doing.  Continue current medications.  Follow up if symptoms persist or worsen.  - fluticasone propionate (FLONASE) 50 mcg/actuation nasal spray; SPRAY 2 pumps INTO EACH NOSTRIL ONCE DAILY  Dispense: 16 mL; Refill: 3    No follow-ups on file.    Disclaimer:  This note may have been prepared using voice recognition software, it may have not been extensively proofed, as such there could be errors within the text such as sound alike errors.

## 2024-05-14 ENCOUNTER — ANESTHESIA EVENT (OUTPATIENT)
Dept: INTERVENTIONAL RADIOLOGY/VASCULAR | Facility: HOSPITAL | Age: 74
DRG: 329 | End: 2024-05-14
Payer: MEDICARE

## 2024-05-14 ENCOUNTER — TELEPHONE (OUTPATIENT)
Dept: HEMATOLOGY/ONCOLOGY | Facility: CLINIC | Age: 74
End: 2024-05-14
Payer: MEDICARE

## 2024-05-14 ENCOUNTER — NURSE TRIAGE (OUTPATIENT)
Dept: ADMINISTRATIVE | Facility: CLINIC | Age: 74
End: 2024-05-14
Payer: MEDICARE

## 2024-05-14 ENCOUNTER — HOSPITAL ENCOUNTER (INPATIENT)
Facility: HOSPITAL | Age: 74
LOS: 20 days | Discharge: NURSING, REHAB OR LONG-TERM CARE | DRG: 329 | End: 2024-06-03
Attending: EMERGENCY MEDICINE | Admitting: INTERNAL MEDICINE
Payer: MEDICARE

## 2024-05-14 ENCOUNTER — TELEPHONE (OUTPATIENT)
Dept: TRANSPLANT | Facility: CLINIC | Age: 74
End: 2024-05-14
Payer: MEDICARE

## 2024-05-14 ENCOUNTER — PATIENT MESSAGE (OUTPATIENT)
Dept: HEMATOLOGY/ONCOLOGY | Facility: CLINIC | Age: 74
End: 2024-05-14
Payer: MEDICARE

## 2024-05-14 DIAGNOSIS — M79.605 LEFT LEG PAIN: ICD-10-CM

## 2024-05-14 DIAGNOSIS — K55.9 ISCHEMIA, BOWEL: ICD-10-CM

## 2024-05-14 DIAGNOSIS — I73.9 PAD (PERIPHERAL ARTERY DISEASE): ICD-10-CM

## 2024-05-14 DIAGNOSIS — K92.1 MELENA: ICD-10-CM

## 2024-05-14 DIAGNOSIS — N18.31 CKD STAGE G3A/A1, GFR 45-59 AND ALBUMIN CREATININE RATIO <30 MG/G: ICD-10-CM

## 2024-05-14 DIAGNOSIS — I50.30 HEART FAILURE WITH PRESERVED EJECTION FRACTION, UNSPECIFIED HF CHRONICITY: ICD-10-CM

## 2024-05-14 DIAGNOSIS — D75.839 THROMBOCYTOSIS: ICD-10-CM

## 2024-05-14 DIAGNOSIS — J43.9 PULMONARY EMPHYSEMA, UNSPECIFIED EMPHYSEMA TYPE: Chronic | ICD-10-CM

## 2024-05-14 DIAGNOSIS — K21.9 GASTROESOPHAGEAL REFLUX DISEASE WITHOUT ESOPHAGITIS: ICD-10-CM

## 2024-05-14 DIAGNOSIS — R22.42 LOCALIZED SWELLING OF LEFT LOWER EXTREMITY: ICD-10-CM

## 2024-05-14 DIAGNOSIS — D72.829 LEUKOCYTOSIS, UNSPECIFIED TYPE: ICD-10-CM

## 2024-05-14 DIAGNOSIS — D62 ACUTE BLOOD LOSS ANEMIA: ICD-10-CM

## 2024-05-14 DIAGNOSIS — D72.829 LEUKOCYTOSIS: ICD-10-CM

## 2024-05-14 DIAGNOSIS — K92.2 ACUTE LOWER GI BLEEDING: Primary | ICD-10-CM

## 2024-05-14 DIAGNOSIS — K56.7 ILEUS: ICD-10-CM

## 2024-05-14 LAB
ABO + RH BLD: NORMAL
ACANTHOCYTES BLD QL SMEAR: PRESENT
ALBUMIN SERPL BCP-MCNC: 2.7 G/DL (ref 3.5–5.2)
ALLENS TEST: NORMAL
ALP SERPL-CCNC: 70 U/L (ref 55–135)
ALT SERPL W/O P-5'-P-CCNC: 19 U/L (ref 10–44)
ANION GAP SERPL CALC-SCNC: 16 MMOL/L (ref 8–16)
ANISOCYTOSIS BLD QL SMEAR: SLIGHT
ANISOCYTOSIS BLD QL SMEAR: SLIGHT
APTT PPP: 24.5 SEC (ref 21–32)
AST SERPL-CCNC: 32 U/L (ref 10–40)
BASO STIPL BLD QL SMEAR: ABNORMAL
BASOPHILS # BLD AUTO: 0.05 K/UL (ref 0–0.2)
BASOPHILS # BLD AUTO: ABNORMAL K/UL (ref 0–0.2)
BASOPHILS NFR BLD: 0 % (ref 0–1.9)
BASOPHILS NFR BLD: 0.2 % (ref 0–1.9)
BILIRUB SERPL-MCNC: 0.6 MG/DL (ref 0.1–1)
BLD GP AB SCN CELLS X3 SERPL QL: NORMAL
BLD PROD TYP BPU: NORMAL
BLD PROD TYP BPU: NORMAL
BLOOD UNIT EXPIRATION DATE: NORMAL
BLOOD UNIT EXPIRATION DATE: NORMAL
BLOOD UNIT TYPE CODE: 6200
BLOOD UNIT TYPE CODE: 6200
BLOOD UNIT TYPE: NORMAL
BLOOD UNIT TYPE: NORMAL
BUN SERPL-MCNC: 26 MG/DL (ref 6–30)
BUN SERPL-MCNC: 27 MG/DL (ref 8–23)
BURR CELLS BLD QL SMEAR: ABNORMAL
CALCIUM SERPL-MCNC: 8.1 MG/DL (ref 8.7–10.5)
CHLORIDE SERPL-SCNC: 98 MMOL/L (ref 95–110)
CHLORIDE SERPL-SCNC: 99 MMOL/L (ref 95–110)
CO2 SERPL-SCNC: 21 MMOL/L (ref 23–29)
CODING SYSTEM: NORMAL
CODING SYSTEM: NORMAL
CREAT SERPL-MCNC: 1.2 MG/DL (ref 0.5–1.4)
CREAT SERPL-MCNC: 1.3 MG/DL (ref 0.5–1.4)
CROSSMATCH INTERPRETATION: NORMAL
CROSSMATCH INTERPRETATION: NORMAL
DACRYOCYTES BLD QL SMEAR: ABNORMAL
DIFFERENTIAL METHOD BLD: ABNORMAL
DIFFERENTIAL METHOD BLD: ABNORMAL
DISPENSE STATUS: NORMAL
DISPENSE STATUS: NORMAL
DOHLE BOD BLD QL SMEAR: PRESENT
EOSINOPHIL # BLD AUTO: 0.1 K/UL (ref 0–0.5)
EOSINOPHIL # BLD AUTO: ABNORMAL K/UL (ref 0–0.5)
EOSINOPHIL NFR BLD: 0 % (ref 0–8)
EOSINOPHIL NFR BLD: 0.6 % (ref 0–8)
ERYTHROCYTE [DISTWIDTH] IN BLOOD BY AUTOMATED COUNT: 16.6 % (ref 11.5–14.5)
ERYTHROCYTE [DISTWIDTH] IN BLOOD BY AUTOMATED COUNT: 22.7 % (ref 11.5–14.5)
EST. GFR  (NO RACE VARIABLE): 47.5 ML/MIN/1.73 M^2
GLUCOSE SERPL-MCNC: 125 MG/DL (ref 70–110)
GLUCOSE SERPL-MCNC: 127 MG/DL (ref 70–110)
HCT VFR BLD AUTO: 19.1 % (ref 37–48.5)
HCT VFR BLD AUTO: 26.9 % (ref 37–48.5)
HCT VFR BLD CALC: 22 %PCV (ref 36–54)
HGB BLD-MCNC: 5.9 G/DL (ref 12–16)
HGB BLD-MCNC: 8.8 G/DL (ref 12–16)
HYPOCHROMIA BLD QL SMEAR: ABNORMAL
HYPOCHROMIA BLD QL SMEAR: ABNORMAL
IMM GRANULOCYTES # BLD AUTO: 1.07 K/UL (ref 0–0.04)
IMM GRANULOCYTES # BLD AUTO: ABNORMAL K/UL (ref 0–0.04)
IMM GRANULOCYTES NFR BLD AUTO: 4.8 % (ref 0–0.5)
IMM GRANULOCYTES NFR BLD AUTO: ABNORMAL % (ref 0–0.5)
INR PPP: 1.1 (ref 0.8–1.2)
LDH SERPL L TO P-CCNC: 1.35 MMOL/L (ref 0.5–2.2)
LYMPHOCYTES # BLD AUTO: 1.5 K/UL (ref 1–4.8)
LYMPHOCYTES # BLD AUTO: ABNORMAL K/UL (ref 1–4.8)
LYMPHOCYTES NFR BLD: 3 % (ref 18–48)
LYMPHOCYTES NFR BLD: 6.8 % (ref 18–48)
MAGNESIUM SERPL-MCNC: 1.1 MG/DL (ref 1.6–2.6)
MCH RBC QN AUTO: 27.7 PG (ref 27–31)
MCH RBC QN AUTO: 29.7 PG (ref 27–31)
MCHC RBC AUTO-ENTMCNC: 30.9 G/DL (ref 32–36)
MCHC RBC AUTO-ENTMCNC: 32.7 G/DL (ref 32–36)
MCV RBC AUTO: 90 FL (ref 82–98)
MCV RBC AUTO: 91 FL (ref 82–98)
METAMYELOCYTES NFR BLD MANUAL: 1 %
MONOCYTES # BLD AUTO: 2.5 K/UL (ref 0.3–1)
MONOCYTES # BLD AUTO: ABNORMAL K/UL (ref 0.3–1)
MONOCYTES NFR BLD: 10 % (ref 4–15)
MONOCYTES NFR BLD: 11 % (ref 4–15)
NEUTROPHILS # BLD AUTO: 17.1 K/UL (ref 1.8–7.7)
NEUTROPHILS NFR BLD: 76.6 % (ref 38–73)
NEUTROPHILS NFR BLD: 80 % (ref 38–73)
NEUTS BAND NFR BLD MANUAL: 6 %
NRBC BLD-RTO: 0 /100 WBC
NRBC BLD-RTO: 1 /100 WBC
NUM UNITS TRANS PACKED RBC: NORMAL
NUM UNITS TRANS PACKED RBC: NORMAL
OHS QRS DURATION: 80 MS
OHS QTC CALCULATION: 461 MS
OVALOCYTES BLD QL SMEAR: ABNORMAL
OVALOCYTES BLD QL SMEAR: ABNORMAL
PAPPENHEIMER BOD BLD QL SMEAR: PRESENT
PLATELET # BLD AUTO: 289 K/UL (ref 150–450)
PLATELET # BLD AUTO: 452 K/UL (ref 150–450)
PLATELET BLD QL SMEAR: ABNORMAL
PLATELET BLD QL SMEAR: ABNORMAL
PMV BLD AUTO: 9.7 FL (ref 9.2–12.9)
PMV BLD AUTO: 9.8 FL (ref 9.2–12.9)
POC IONIZED CALCIUM: 0.96 MMOL/L (ref 1.06–1.42)
POC TCO2 (MEASURED): 23 MMOL/L (ref 23–29)
POIKILOCYTOSIS BLD QL SMEAR: SLIGHT
POIKILOCYTOSIS BLD QL SMEAR: SLIGHT
POLYCHROMASIA BLD QL SMEAR: ABNORMAL
POLYCHROMASIA BLD QL SMEAR: ABNORMAL
POTASSIUM BLD-SCNC: 3.1 MMOL/L (ref 3.5–5.1)
POTASSIUM SERPL-SCNC: 3.1 MMOL/L (ref 3.5–5.1)
PROT SERPL-MCNC: 6.2 G/DL (ref 6–8.4)
PROTHROMBIN TIME: 11.7 SEC (ref 9–12.5)
RBC # BLD AUTO: 2.13 M/UL (ref 4–5.4)
RBC # BLD AUTO: 2.96 M/UL (ref 4–5.4)
SAMPLE: ABNORMAL
SAMPLE: NORMAL
SCHISTOCYTES BLD QL SMEAR: ABNORMAL
SCHISTOCYTES BLD QL SMEAR: PRESENT
SCHISTOCYTES BLD QL SMEAR: PRESENT
SITE: NORMAL
SODIUM BLD-SCNC: 135 MMOL/L (ref 136–145)
SODIUM SERPL-SCNC: 135 MMOL/L (ref 136–145)
SPECIMEN OUTDATE: NORMAL
SPHEROCYTES BLD QL SMEAR: ABNORMAL
TARGETS BLD QL SMEAR: ABNORMAL
TARGETS BLD QL SMEAR: ABNORMAL
TOXIC GRANULES BLD QL SMEAR: PRESENT
TOXIC GRANULES BLD QL SMEAR: PRESENT
WBC # BLD AUTO: 22.37 K/UL (ref 3.9–12.7)
WBC # BLD AUTO: 32.73 K/UL (ref 3.9–12.7)

## 2024-05-14 PROCEDURE — 99900035 HC TECH TIME PER 15 MIN (STAT): Mod: NTX

## 2024-05-14 PROCEDURE — 99285 EMERGENCY DEPT VISIT HI MDM: CPT | Mod: 25,NTX

## 2024-05-14 PROCEDURE — C9113 INJ PANTOPRAZOLE SODIUM, VIA: HCPCS | Mod: NTX | Performed by: PHYSICIAN ASSISTANT

## 2024-05-14 PROCEDURE — 96375 TX/PRO/DX INJ NEW DRUG ADDON: CPT | Mod: NTX

## 2024-05-14 PROCEDURE — P9016 RBC LEUKOCYTES REDUCED: HCPCS | Mod: NTX | Performed by: RADIOLOGY

## 2024-05-14 PROCEDURE — 85730 THROMBOPLASTIN TIME PARTIAL: CPT | Mod: NTX | Performed by: PHYSICIAN ASSISTANT

## 2024-05-14 PROCEDURE — 83735 ASSAY OF MAGNESIUM: CPT | Mod: NTX | Performed by: PHYSICIAN ASSISTANT

## 2024-05-14 PROCEDURE — 85027 COMPLETE CBC AUTOMATED: CPT | Mod: NTX

## 2024-05-14 PROCEDURE — P9016 RBC LEUKOCYTES REDUCED: HCPCS | Mod: NTX | Performed by: PHYSICIAN ASSISTANT

## 2024-05-14 PROCEDURE — 93005 ELECTROCARDIOGRAM TRACING: CPT | Mod: NTX

## 2024-05-14 PROCEDURE — 36430 TRANSFUSION BLD/BLD COMPNT: CPT | Mod: NTX

## 2024-05-14 PROCEDURE — 25000003 PHARM REV CODE 250: Mod: NTX | Performed by: PHYSICIAN ASSISTANT

## 2024-05-14 PROCEDURE — 96365 THER/PROPH/DIAG IV INF INIT: CPT | Mod: NTX

## 2024-05-14 PROCEDURE — 63600175 PHARM REV CODE 636 W HCPCS: Mod: NTX

## 2024-05-14 PROCEDURE — 99223 1ST HOSP IP/OBS HIGH 75: CPT | Mod: GC,NTX,, | Performed by: INTERNAL MEDICINE

## 2024-05-14 PROCEDURE — D9220A PRA ANESTHESIA: Mod: CRNA,NTX,, | Performed by: NURSE ANESTHETIST, CERTIFIED REGISTERED

## 2024-05-14 PROCEDURE — 25000003 PHARM REV CODE 250: Mod: NTX | Performed by: NURSE ANESTHETIST, CERTIFIED REGISTERED

## 2024-05-14 PROCEDURE — 83605 ASSAY OF LACTIC ACID: CPT | Mod: NTX

## 2024-05-14 PROCEDURE — 93010 ELECTROCARDIOGRAM REPORT: CPT | Mod: NTX,,, | Performed by: INTERNAL MEDICINE

## 2024-05-14 PROCEDURE — 86920 COMPATIBILITY TEST SPIN: CPT | Mod: NTX | Performed by: RADIOLOGY

## 2024-05-14 PROCEDURE — C9113 INJ PANTOPRAZOLE SODIUM, VIA: HCPCS | Mod: NTX

## 2024-05-14 PROCEDURE — 25500020 PHARM REV CODE 255: Mod: NTX | Performed by: INTERNAL MEDICINE

## 2024-05-14 PROCEDURE — 27000221 HC OXYGEN, UP TO 24 HOURS: Mod: NTX

## 2024-05-14 PROCEDURE — 80047 BASIC METABLC PNL IONIZED CA: CPT | Mod: NTX

## 2024-05-14 PROCEDURE — 04L53DZ OCCLUSION OF SUPERIOR MESENTERIC ARTERY WITH INTRALUMINAL DEVICE, PERCUTANEOUS APPROACH: ICD-10-PCS | Performed by: RADIOLOGY

## 2024-05-14 PROCEDURE — 86920 COMPATIBILITY TEST SPIN: CPT | Mod: NTX | Performed by: PHYSICIAN ASSISTANT

## 2024-05-14 PROCEDURE — 63600175 PHARM REV CODE 636 W HCPCS: Mod: NTX | Performed by: NURSE ANESTHETIST, CERTIFIED REGISTERED

## 2024-05-14 PROCEDURE — B41C1ZZ FLUOROSCOPY OF PELVIC ARTERIES USING LOW OSMOLAR CONTRAST: ICD-10-PCS | Performed by: RADIOLOGY

## 2024-05-14 PROCEDURE — 96361 HYDRATE IV INFUSION ADD-ON: CPT | Mod: NTX

## 2024-05-14 PROCEDURE — B4141ZZ FLUOROSCOPY OF SUPERIOR MESENTERIC ARTERY USING LOW OSMOLAR CONTRAST: ICD-10-PCS | Performed by: RADIOLOGY

## 2024-05-14 PROCEDURE — 86901 BLOOD TYPING SEROLOGIC RH(D): CPT | Mod: NTX | Performed by: PHYSICIAN ASSISTANT

## 2024-05-14 PROCEDURE — 85025 COMPLETE CBC W/AUTO DIFF WBC: CPT | Mod: NTX | Performed by: PHYSICIAN ASSISTANT

## 2024-05-14 PROCEDURE — 80053 COMPREHEN METABOLIC PANEL: CPT | Mod: NTX | Performed by: PHYSICIAN ASSISTANT

## 2024-05-14 PROCEDURE — 85610 PROTHROMBIN TIME: CPT | Mod: NTX | Performed by: PHYSICIAN ASSISTANT

## 2024-05-14 PROCEDURE — 85007 BL SMEAR W/DIFF WBC COUNT: CPT | Mod: NTX

## 2024-05-14 PROCEDURE — 30233N1 TRANSFUSION OF NONAUTOLOGOUS RED BLOOD CELLS INTO PERIPHERAL VEIN, PERCUTANEOUS APPROACH: ICD-10-PCS | Performed by: INTERNAL MEDICINE

## 2024-05-14 PROCEDURE — 82803 BLOOD GASES ANY COMBINATION: CPT | Mod: NTX

## 2024-05-14 PROCEDURE — 96366 THER/PROPH/DIAG IV INF ADDON: CPT | Mod: NTX

## 2024-05-14 PROCEDURE — D9220A PRA ANESTHESIA: Mod: ANES,NTX,, | Performed by: ANESTHESIOLOGY

## 2024-05-14 PROCEDURE — 25500020 PHARM REV CODE 255: Mod: NTX | Performed by: EMERGENCY MEDICINE

## 2024-05-14 PROCEDURE — 94761 N-INVAS EAR/PLS OXIMETRY MLT: CPT | Mod: NTX,XB

## 2024-05-14 PROCEDURE — 63600175 PHARM REV CODE 636 W HCPCS: Mod: NTX | Performed by: PHYSICIAN ASSISTANT

## 2024-05-14 PROCEDURE — 20000000 HC ICU ROOM: Mod: NTX

## 2024-05-14 RX ORDER — LIDOCAINE HYDROCHLORIDE 20 MG/ML
INJECTION INTRAVENOUS
Status: DISCONTINUED | OUTPATIENT
Start: 2024-05-14 | End: 2024-05-14

## 2024-05-14 RX ORDER — ADHESIVE TAPE 1.5"X360"
TAPE, NON-MEDICATED TOPICAL
COMMUNITY

## 2024-05-14 RX ORDER — SODIUM CHLORIDE 0.9 % (FLUSH) 0.9 %
10 SYRINGE (ML) INJECTION
OUTPATIENT
Start: 2024-05-14

## 2024-05-14 RX ORDER — FENTANYL CITRATE 50 UG/ML
25 INJECTION, SOLUTION INTRAMUSCULAR; INTRAVENOUS EVERY 5 MIN PRN
OUTPATIENT
Start: 2024-05-14

## 2024-05-14 RX ORDER — HYDROCODONE BITARTRATE AND ACETAMINOPHEN 500; 5 MG/1; MG/1
TABLET ORAL
Status: DISCONTINUED | OUTPATIENT
Start: 2024-05-14 | End: 2024-05-15

## 2024-05-14 RX ORDER — FENTANYL CITRATE 50 UG/ML
INJECTION, SOLUTION INTRAMUSCULAR; INTRAVENOUS
Status: DISCONTINUED | OUTPATIENT
Start: 2024-05-14 | End: 2024-05-14

## 2024-05-14 RX ORDER — ONDANSETRON 2 MG/ML
INJECTION INTRAMUSCULAR; INTRAVENOUS
Status: DISCONTINUED | OUTPATIENT
Start: 2024-05-14 | End: 2024-05-14

## 2024-05-14 RX ORDER — SUCCINYLCHOLINE CHLORIDE 20 MG/ML
INJECTION INTRAMUSCULAR; INTRAVENOUS
Status: DISCONTINUED | OUTPATIENT
Start: 2024-05-14 | End: 2024-05-14

## 2024-05-14 RX ORDER — ALLOPURINOL 100 MG/1
200 TABLET ORAL DAILY
Status: DISCONTINUED | OUTPATIENT
Start: 2024-05-15 | End: 2024-05-17

## 2024-05-14 RX ORDER — PANTOPRAZOLE SODIUM 40 MG/10ML
40 INJECTION, POWDER, LYOPHILIZED, FOR SOLUTION INTRAVENOUS 2 TIMES DAILY
Status: DISCONTINUED | OUTPATIENT
Start: 2024-05-14 | End: 2024-05-16

## 2024-05-14 RX ORDER — ONDANSETRON HYDROCHLORIDE 2 MG/ML
INJECTION, SOLUTION INTRAVENOUS
Status: DISCONTINUED | OUTPATIENT
Start: 2024-05-14 | End: 2024-05-14

## 2024-05-14 RX ORDER — PANTOPRAZOLE SODIUM 40 MG/10ML
80 INJECTION, POWDER, LYOPHILIZED, FOR SOLUTION INTRAVENOUS
Status: COMPLETED | OUTPATIENT
Start: 2024-05-14 | End: 2024-05-14

## 2024-05-14 RX ORDER — ACETAMINOPHEN 325 MG/1
650 TABLET ORAL EVERY 6 HOURS PRN
Status: DISCONTINUED | OUTPATIENT
Start: 2024-05-14 | End: 2024-05-17

## 2024-05-14 RX ORDER — IPRATROPIUM BROMIDE AND ALBUTEROL SULFATE 2.5; .5 MG/3ML; MG/3ML
3 SOLUTION RESPIRATORY (INHALATION) EVERY 6 HOURS PRN
Status: DISCONTINUED | OUTPATIENT
Start: 2024-05-14 | End: 2024-05-20

## 2024-05-14 RX ORDER — FLUTICASONE FUROATE AND VILANTEROL 100; 25 UG/1; UG/1
1 POWDER RESPIRATORY (INHALATION) DAILY
Status: DISCONTINUED | OUTPATIENT
Start: 2024-05-15 | End: 2024-05-17

## 2024-05-14 RX ORDER — MAGNESIUM SULFATE HEPTAHYDRATE 40 MG/ML
2 INJECTION, SOLUTION INTRAVENOUS
Status: COMPLETED | OUTPATIENT
Start: 2024-05-14 | End: 2024-05-14

## 2024-05-14 RX ORDER — QUETIAPINE FUMARATE 25 MG/1
50 TABLET, FILM COATED ORAL
Status: COMPLETED | OUTPATIENT
Start: 2024-05-14 | End: 2024-05-14

## 2024-05-14 RX ORDER — PROPOFOL 10 MG/ML
VIAL (ML) INTRAVENOUS
Status: DISCONTINUED | OUTPATIENT
Start: 2024-05-14 | End: 2024-05-14

## 2024-05-14 RX ORDER — ROCURONIUM BROMIDE 10 MG/ML
INJECTION, SOLUTION INTRAVENOUS
Status: DISCONTINUED | OUTPATIENT
Start: 2024-05-14 | End: 2024-05-14

## 2024-05-14 RX ORDER — MIDAZOLAM HYDROCHLORIDE 1 MG/ML
INJECTION INTRAMUSCULAR; INTRAVENOUS
Status: DISCONTINUED | OUTPATIENT
Start: 2024-05-14 | End: 2024-05-14

## 2024-05-14 RX ORDER — HALOPERIDOL 5 MG/ML
0.5 INJECTION INTRAMUSCULAR EVERY 10 MIN PRN
OUTPATIENT
Start: 2024-05-14

## 2024-05-14 RX ORDER — PHENYLEPHRINE HYDROCHLORIDE 10 MG/ML
INJECTION INTRAVENOUS
Status: DISCONTINUED | OUTPATIENT
Start: 2024-05-14 | End: 2024-05-14

## 2024-05-14 RX ADMIN — PHENYLEPHRINE HYDROCHLORIDE 100 MCG: 10 INJECTION INTRAVENOUS at 07:05

## 2024-05-14 RX ADMIN — PHENYLEPHRINE HYDROCHLORIDE 0.25 MCG/KG/MIN: 10 INJECTION INTRAVENOUS at 07:05

## 2024-05-14 RX ADMIN — CALCIUM CHLORIDE INJECTION 1 G: 100 INJECTION, SOLUTION INTRAVENOUS at 06:05

## 2024-05-14 RX ADMIN — SUGAMMADEX 200 MG: 100 INJECTION, SOLUTION INTRAVENOUS at 08:05

## 2024-05-14 RX ADMIN — SUCCINYLCHOLINE CHLORIDE 120 MG: 20 INJECTION, SOLUTION INTRAMUSCULAR; INTRAVENOUS at 05:05

## 2024-05-14 RX ADMIN — FENTANYL CITRATE 50 MCG: 50 INJECTION, SOLUTION INTRAMUSCULAR; INTRAVENOUS at 05:05

## 2024-05-14 RX ADMIN — SODIUM CHLORIDE 1000 ML: 9 INJECTION, SOLUTION INTRAVENOUS at 01:05

## 2024-05-14 RX ADMIN — ROCURONIUM BROMIDE 10 MG: 10 INJECTION, SOLUTION INTRAVENOUS at 05:05

## 2024-05-14 RX ADMIN — PHENYLEPHRINE HYDROCHLORIDE 200 MCG: 10 INJECTION INTRAVENOUS at 05:05

## 2024-05-14 RX ADMIN — PHENYLEPHRINE HYDROCHLORIDE 200 MCG: 10 INJECTION INTRAVENOUS at 06:05

## 2024-05-14 RX ADMIN — ONDANSETRON 4 MG: 2 INJECTION INTRAMUSCULAR; INTRAVENOUS at 07:05

## 2024-05-14 RX ADMIN — PANTOPRAZOLE SODIUM 80 MG: 40 INJECTION, POWDER, FOR SOLUTION INTRAVENOUS at 01:05

## 2024-05-14 RX ADMIN — LIDOCAINE HYDROCHLORIDE 70 MG: 20 INJECTION INTRAVENOUS at 05:05

## 2024-05-14 RX ADMIN — MIDAZOLAM HYDROCHLORIDE 2 MG: 2 INJECTION, SOLUTION INTRAMUSCULAR; INTRAVENOUS at 07:05

## 2024-05-14 RX ADMIN — IOHEXOL 100 ML: 350 INJECTION, SOLUTION INTRAVENOUS at 03:05

## 2024-05-14 RX ADMIN — MAGNESIUM SULFATE HEPTAHYDRATE 2 G: 40 INJECTION, SOLUTION INTRAVENOUS at 02:05

## 2024-05-14 RX ADMIN — PROPOFOL 100 MG: 10 INJECTION, EMULSION INTRAVENOUS at 05:05

## 2024-05-14 RX ADMIN — PANTOPRAZOLE SODIUM 40 MG: 40 INJECTION, POWDER, FOR SOLUTION INTRAVENOUS at 09:05

## 2024-05-14 RX ADMIN — QUETIAPINE FUMARATE 50 MG: 25 TABLET ORAL at 12:05

## 2024-05-14 RX ADMIN — ROCURONIUM BROMIDE 20 MG: 10 INJECTION, SOLUTION INTRAVENOUS at 06:05

## 2024-05-14 RX ADMIN — IOHEXOL 80 ML: 300 INJECTION, SOLUTION INTRAVENOUS at 07:05

## 2024-05-14 RX ADMIN — PHENYLEPHRINE HYDROCHLORIDE 150 MCG: 10 INJECTION INTRAVENOUS at 07:05

## 2024-05-14 RX ADMIN — SODIUM CHLORIDE: 0.9 INJECTION, SOLUTION INTRAVENOUS at 05:05

## 2024-05-14 NOTE — ED NOTES
Pt had syncope episode while in bed. Sessions, MD informed. MD requested for blood infusion to be given by high flow.

## 2024-05-14 NOTE — ASSESSMENT & PLAN NOTE
73 year old female with pmh of CAD, GERD, COPD, CKD, HFpEF, HRN and HLD presents to [facility] for new onset weakness, dizziness, SOB, and passing of melena. Last evening she had a large volume episode of melena with clots. She reports feeling SOB and needing O2 for the first time in 5 months as well as dizzy and weak. She regularly takes celocoxib for arthritic pain and enjoys red wine,she is a former smoker. [place] but she reports it being normal. Her last colonoscopy was in 2021 and was unremarkable. Her Hgb on admission was 5.9 and she is receiving 2 units of pRBC.       - Recommend IR consult for embolization given positive CTA (spoke with radiology reading room)  - Keep NPO for now; pending IR intervention  - If IR unable to embolize, plan for colonoscopy tomorrow; CLD today and NPO at midnight. If this is the case, please page GI fellow on call to order bowel prep.  - Trend Hgb q8hrs. Transfuse for Hgb > 7, unless otherwise indicated  - Bolus IV pantoprazole 80mg followed by 40mg BID  - Maintain IV access with 2 large bore Ivs  - Intravascular resuscitation/support with IVFs   - Hold all NSAIDs and anticoagulants, unless contraindicated  - Please correct any coagulopathy with platelets and FFP for goal of platelets >50K and INR <2.0  - Please notify GI team if there is significant change in patient's clinical status

## 2024-05-14 NOTE — LETTER
Ramila Floyd accompanied her mother to the hospital. She was admitted on 5/14/2024 and remains in the hospital to this date. Please excuse Ramila Floyd from from jury duty requirements.    If you have any questions or concerns, please don't hesitate to call.      Alesia Archer MD  Hospital Medicine [facility]

## 2024-05-14 NOTE — ED PROVIDER NOTES
"Encounter Date: 5/14/2024       History     Chief Complaint   Patient presents with    Melena     Arrives via EMS from home, diarrhea x 2 days, Had BM last night with dark blood in it, had iron infusion on Monday, denies pain     The history is provided by the patient and medical records. No  was used.     Jessica Floyd is a 73 y.o. female with medical history of COPD/pulm HTN, CRF on O2 PRN, R carotid stent on ASA, HFpEF on Lasix, Macrocytic anemia, ERIK on infusions presenting to the ED with the chief complaint of melena.     Reports 2 days of dark colored diarrhea. Estimates to have had about 6 episodes. Reports her stools look like "afterbirth." Reports associated generalized weakness and lightheadedness. Almost passed out last night walking in her house and  had to assist her to sit down. Reports non-localized abdominal "discomfort." No nausea, vomiting, fever, chest pain, SOB, cough, urinary changes. Daily ASA use. Last colonoscopy at outside facility in 2021 and was WNL per her recollection. No history of GI bleeds. She is on iron infusions for ERIK. History of severe nosebleeds. She also reports feeling anxious. She is on Seroquel BID and did not take her morning dose. Received about 100cc with EMS.     Review of patient's allergies indicates:  No Known Allergies  Past Medical History:   Diagnosis Date    Allergic rhinitis     Amblyopia     Carotid stenosis     Coronary atherosclerosis     Outside [facility] 6/2014: 20% mLAD, 50% mCx, 20%, mRCA    Dyslipidemia     ESBL (extended spectrum beta-lactamase) producing bacteria infection     UTI    Hypertension     Nausea and vomiting 05/26/2017    Pulmonary emphysema 10/28/2023    SAH (subarachnoid hemorrhage) 08/24/2016 1999, s/p clipping    Subarachnoid hemorrhage due to ruptured aneurysm 1999     Past Surgical History:   Procedure Laterality Date    ADENOIDECTOMY      ANTERIOR CRUCIATE LIGAMENT REPAIR  2012    APPENDECTOMY      CATARACT " EXTRACTION W/  INTRAOCULAR LENS IMPLANT Right 2022    Procedure: EXTRACTION, CATARACT, WITH IOL INSERTION;  Surgeon: Higinio Cristina MD;  Location: [facility] OR;  Service: Ophthalmology;  Laterality: Right;    CATARACT EXTRACTION W/  INTRAOCULAR LENS IMPLANT Left 2022    Procedure: EXTRACTION, CATARACT, WITH IOL INSERTION;  Surgeon: Higinio Cristina MD;  Location: [facility] OR;  Service: Ophthalmology;  Laterality: Left;    CEREBRAL ANEURYSM REPAIR      clip    DENTAL SURGERY      EYE SURGERY Bilateral     cataract removal and lens implants    HIP ARTHROPLASTY Left 2023    Procedure: TOTAL HIP ARTHROPLASTY;  Surgeon: Juan Wan MD;  Location: [address];  Service: Orthopedics;  Laterality: Left;    TONSILLECTOMY       Family History   Problem Relation Name Age of Onset    Hypertension Mother      Aneurysm Mother      Hypertension Father      Alcohol abuse Father      Kidney disease Brother      Heart disease Brother      Gout Brother      No Known Problems Daughter Hannah     No Known Problems Daughter Aby     No Known Problems Daughter Diya      Social History     Tobacco Use    Smoking status: Former     Current packs/day: 0.00     Average packs/day: 0.5 packs/day for 20.0 years (10.0 ttl pk-yrs)     Types: Cigarettes     Start date: 1979     Quit date: 1999     Years since quittin.3     Passive exposure: Past    Smokeless tobacco: Never   Substance Use Topics    Alcohol use: Yes     Alcohol/week: 7.0 standard drinks of alcohol     Types: 7 Glasses of wine per week     Comment: One glass of Red wine prior to dinner    Drug use: No     Review of Systems   Gastrointestinal:  Positive for diarrhea.       Physical Exam     Initial Vitals [24 1217]   BP Pulse Resp Temp SpO2   123/70 99 16 96.8 °F (36 °C) 97 %      MAP       --         Physical Exam    Constitutional: She appears well-developed and well-nourished. She is not diaphoretic. No distress.   HENT:   Head:  Normocephalic and atraumatic.   Mouth/Throat: Oropharynx is clear and moist. No oropharyngeal exudate.   Eyes: Conjunctivae and EOM are normal. Pupils are equal, round, and reactive to light. No scleral icterus.   Neck: Neck supple.   Normal range of motion.  Cardiovascular:  Regular rhythm.   Tachycardia present.         Pulmonary/Chest: Breath sounds normal. No respiratory distress. She has no wheezes.   Abdominal: Abdomen is soft. She exhibits no distension. There is no abdominal tenderness. There is no rebound.   Genitourinary: Rectum:      Guaiac result positive.   Guaiac positive stool. : Acceptable.   Genitourinary Comments: Rectal: Melena mixed with maroon colored stools. FOBT positive. No visible hemorrhoids.     Musculoskeletal:         General: No tenderness or edema. Normal range of motion.      Cervical back: Normal range of motion and neck supple.     Neurological: She is alert and oriented to person, place, and time. She has normal strength. No sensory deficit.   Skin: Skin is warm and dry. No rash noted. No erythema.   Psychiatric: She has a normal mood and affect.         ED Course   Procedures  Labs Reviewed   CBC W/ AUTO DIFFERENTIAL - Abnormal; Notable for the following components:       Result Value    WBC 22.37 (*)     RBC 2.13 (*)     Hemoglobin 5.9 (*)     Hematocrit 19.1 (*)     MCHC 30.9 (*)     RDW 22.7 (*)     Platelets 452 (*)     Immature Granulocytes 4.8 (*)     Gran # (ANC) 17.1 (*)     Immature Grans (Abs) 1.07 (*)     Mono # 2.5 (*)     nRBC 1 (*)     Gran % 76.6 (*)     Lymph % 6.8 (*)     All other components within normal limits    Narrative:     H&H   critical result(s) called and verbal readback obtained from   Vivien Zamora RN by JERI 05/14/2024 13:50   COMPREHENSIVE METABOLIC PANEL - Abnormal; Notable for the following components:    Sodium 135 (*)     Potassium 3.1 (*)     CO2 21 (*)     Glucose 125 (*)     BUN 27 (*)     Calcium 8.1 (*)     Albumin 2.7 (*)      eGFR 47.5 (*)     All other components within normal limits   MAGNESIUM - Abnormal; Notable for the following components:    Magnesium 1.1 (*)     All other components within normal limits   ISTAT PROCEDURE - Abnormal; Notable for the following components:    POC Glucose 127 (*)     POC Sodium 135 (*)     POC Potassium 3.1 (*)     POC Ionized Calcium 0.96 (*)     POC Hematocrit 22 (*)     All other components within normal limits   PROTIME-INR   APTT   URINALYSIS, REFLEX TO URINE CULTURE   CBC W/ AUTO DIFFERENTIAL   TYPE & SCREEN   ISTAT LACTATE   ISTAT CHEM8   PREPARE RBC SOFT   PREPARE RBC SOFT        ECG Results              EKG 12-lead (Final result)        Collection Time Result Time QRS Duration OHS QTC Calculation    05/14/24 12:36:07 05/14/24 12:59:19 80 461                     Final result by Interface, Lab In [facility] (05/14/24 12:59:27)                   Narrative:    Test Reason : K92.1,    Vent. Rate : 102 BPM     Atrial Rate : 102 BPM     P-R Int : 134 ms          QRS Dur : 080 ms      QT Int : 354 ms       P-R-T Axes : 082 041 256 degrees     QTc Int : 461 ms    Sinus tachycardia with Premature supraventricular complexes  ST and T wave abnormality, consider inferolateral ischemia  Abnormal ECG  When compared with ECG of 27-DEC-2023 10:37,  T wave inversions now present in the inferolateral leads  Confirmed by Michelle Hyman MD ([id number]) on 5/14/2024 12:59:14 PM    Referred By: [email or web address]   SELF           Confirmed By:Michelle Hyman MD                                  Imaging Results              X-Ray Chest AP Portable (Final result)  Result time 05/14/24 16:53:10      Final result by Joan Fernández MD (05/14/24 16:53:10)                   Impression:      No acute intrathoracic process seen.      Electronically signed by: Joan Fernández MD  Date:    05/14/2024  Time:    16:53               Narrative:    EXAMINATION:  XR CHEST AP PORTABLE    CLINICAL HISTORY:  Elevated white blood cell count,  unspecified    TECHNIQUE:  Single frontal view of the chest was performed.    COMPARISON:  12/26/2023    FINDINGS:  The cardiac silhouette is normal in size.  The pulmonary vascularity is normal.    The lungs are well inflated, clear.    No pleural effusion or pneumothorax.    The osseous structures appear normal.    Atherosclerotic plaque of the aorta.                                       CTA Acute GI [place], Abdomen and Pelvis (Final result)  Result time 05/14/24 16:43:22      Final result by Joan Fernández MD (05/14/24 16:43:22)                   Impression:    Impression:    Acute gastrointestinal bleed at the level of the cecum.  The results were communicated to  Checo Lara PA-C and Dr. Harsha Dawson at 4:35 pm by Epic chat system    Severe burden of plaque with near occlusion at the bilateral common iliac artery and bilateral common femoral artery.    Small branching opacity with adjacent nodularity right lower lobe suggestive of inspissated mucus.    Benign left hepatic cyst.    Moderate amount of retained stool.      Electronically signed by: Joan Fernández MD  Date:    05/14/2024  Time:    16:43               Narrative:    EXAMINATION:  CTA acute GI bleed, abdomen and pelvis without and with contrast    CLINICAL HISTORY:  Acute lower GI.    TECHNIQUE:  3.75 mm axial images of the abdomen and pelvis obtained before and after the administration of contrast.  Delayed 3 minutes axial images also acquired.  Coronal and sagittal images reformatted.  The patient received 100 cc of Omni 350 IV contrast.    COMPARISON:  None    FINDINGS:  The lung bases demonstrate a small branching structure right lower lobe with adjacent micronodule suggestive of inspissated mucus (4:8).    No pleural effusion.  No pericardial effusion.    The liver appears normal.  There is a 2.5 cm benign left hepatic cyst.  The gallbladder is mildly distended, no radiopaque stones seen within.    The distal esophagus and  stomach appear normal.    The pancreas, spleen and bilateral adrenal glands appear normal.    Lobulated contour of the bilateral kidneys.  No definite solid kidney mass.  No hydronephrosis.  The bilateral ureters cannot be followed in their entirety within the pelvis.  There is significant artifact created by the left hip hardware obscuring details of the pelvis.  The bladder is moderately enlarged with urine.    There is a moderate amount of stool in the colon.  There is acute bleeding at the cecum (3:401, 4:104).  Few scattered colonic diverticula seen along the sigmoid colon.  No intra-abdominal free air or free fluid.    The abdominal aorta tapers normally.  Severe burden of calcified plaque at the bilateral common iliac artery and bilateral common femoral artery    The abdominal wall is intact, there is a bowel containing left inguinal hernia.  There is moderate compression fracture deformity of L1, mild concavity at the superior endplate of L2 and L4 vertebrae.  (L1 and L2 are chronic, unchanged from prior CTA chest).  Severe facet joint osseous hypertrophy at the lower lumbar spine.                                       Medications   0.9%  NaCl infusion (for blood administration) (has no administration in time range)   allopurinoL tablet 200 mg (has no administration in time range)   fluticasone furoate-vilanteroL 100-25 mcg/dose diskus inhaler 1 puff (has no administration in time range)   albuterol-ipratropium 2.5 mg-0.5 mg/3 mL nebulizer solution 3 mL (has no administration in time range)   0.9%  NaCl infusion (for blood administration) (has no administration in time range)   pantoprazole injection 40 mg (has no administration in time range)   pantoprazole injection 80 mg (80 mg Intravenous Given 5/14/24 1301)   sodium chloride 0.9% bolus 1,000 mL 1,000 mL (0 mLs Intravenous Stopped 5/14/24 1432)   QUEtiapine tablet 50 mg (50 mg Oral Given 5/14/24 1246)   magnesium sulfate 2g in water 50mL IVPB (premix) (0  g Intravenous Stopped 5/14/24 1635)   iohexoL (OMNIPAQUE 350) injection 100 mL (100 mLs Intravenous Given 5/14/24 1538)     Medical Decision Making  73 y.o. female with medical history of COPD/pulm HTN, CRF on O2 PRN, R carotid stent on ASA, HFpEF on Lasix, Macrocytic anemia, ERIK on infusions presenting to the ED c/o dark colored diarrhea, generalized weakness, near syncopal episode over the last 2 days.     She has melena and maroon colored blood on JAMILA. Unable to determine if upper or lower GI bleed as iron therapy influences the color of her stool. DDx includes but not limited to upper GI bleed, diverticular bleed, malignancy, symptomatic anemia, PUD. Will obtain CTA GI bleed for further evaluation. Protonix, IVF, T&S ordered on ED arrival.     Amount and/or Complexity of Data Reviewed  Labs: ordered. Decision-making details documented in ED Course.  Radiology: ordered and independent interpretation performed.  ECG/medicine tests: ordered and independent interpretation performed.  Discussion of management or test interpretation with external provider(s):   GI regarding GI bleed  IR regarding CTA results  Medical ICU regarding admission for symptomatic GI bleed    Risk  Prescription drug management.  Decision regarding hospitalization.               ED Course as of 05/14/24 1803   Tue May 14, 2024   1439 Hemoglobin(!!): 5.9 [BA]   1439 Hematocrit(!!): 19.1  T&S and blood consent obtained. 2 units ordered for transfusion. GI consult placed for GI bleed concerns [BA]   1440 Magnesium (!): 1.1  IV replacement ordered [BA]   1440 WBC(!): 22.37  No clear infectious etiology and afebrile. Will check CXR and UA for further evaluation. WBC was elevated similarly from a month ago.  [BA]   1634 CTA GI bleed pre-[place] read concerning for acute bleed in the cecum. IR consulted. Additionally consulted medical ICU given active bleed, tachycardia, and soft BPs.  [BA]   1712 Notified by nursing staff for concerns of AMS. Patient  hypotensive 86/53 on exam and non-verbal. Placed in Trendelenberg and bolus the remaining first unit of RBC with improvement in mental status and BP. Medical ICU at bedside.  [BA]   1723 IR evaluated at bedside and will plan for embolization tonight.  [BA]   1724 POC Lactate: 1.35  Normal lactate [BA]   1802 Medical ICU admitted to their service for further management. Patient expresses understanding and agreeable to the plan. I have discussed the care of this patient with my supervising physician.  [BA]      ED Course User Index  [BA] Checo Lara PA-C                           Clinical Impression:  Final diagnoses:  [K92.1] Melena  [D72.829] Leukocytosis  [K92.2] Acute lower GI bleeding (Primary)  [D62] Acute blood loss anemia          ED Disposition Condition    Admit Stable                Checo Lara PA-C  05/14/24 1801

## 2024-05-14 NOTE — H&P
Vascular and Interventional Radiology History & Physical    Date:  5/14/2024    Chief Complaint:   GI bleed    History of Present Illness:  Jessica Floyd is a 73 year old female with pmh of CAD, GERD, COPD, CKD, HFpEF, HRN and HLD presented to [facility] for new onset weakness, dizziness, SOB, and passing of melena. She had been feeling weak/dizzy over the weekend but they became significantly more pronounced last evening, to the point where she briefly fainted. After she had gotten back up, she passed a large volume of  melena with clots; family estimated the amount to be 5 cups. She also became SOB and for the first time in 5 months needed her oxygen. After this, they decided to come to the ED. She has never had a GIB before. She does take celecoxib regularly for arthritic pain. She is a former smoker and reports drinking some red wine. She takes asa daily. She denies any N/V or abdominal pain. Pt reportedly has a history of cerebral aneurysm s/p angiogram (unclear intervention). Mother has a history of abdominal aneurysms.     Initial H/H in the ED was 5.9/19.1 with /70 and HR 99. She remained stable until approximately 1630 when her BP began to drop into 70s/40s and she became tachycardic to the 100s.  During evaluation, patient was persistently hypotensive and had an episode of unresponsiveness that responded to trendelenberg and unit bolus of pRBCs. CTA GI bleed positive for acute GI bleed at the level of the cecum. IR has been consulted for GI bleeding.       Past Medical History:  Past Medical History:   Diagnosis Date    Allergic rhinitis     Amblyopia     Carotid stenosis     Coronary atherosclerosis     Outside [facility] 6/2014: 20% mLAD, 50% mCx, 20%, mRCA    Dyslipidemia     ESBL (extended spectrum beta-lactamase) producing bacteria infection     UTI    Hypertension     Nausea and vomiting 05/26/2017    Pulmonary emphysema 10/28/2023    SAH (subarachnoid hemorrhage) 08/24/2016 1999, s/p clipping     Subarachnoid hemorrhage due to ruptured aneurysm        Past Surgical History:  Past Surgical History:   Procedure Laterality Date    ADENOIDECTOMY      ANTERIOR CRUCIATE LIGAMENT REPAIR  2012    APPENDECTOMY      CATARACT EXTRACTION W/  INTRAOCULAR LENS IMPLANT Right 2022    Procedure: EXTRACTION, CATARACT, WITH IOL INSERTION;  Surgeon: Higinio Cristina MD;  Location: [facility];  Service: Ophthalmology;  Laterality: Right;    CATARACT EXTRACTION W/  INTRAOCULAR LENS IMPLANT Left 2022    Procedure: EXTRACTION, CATARACT, WITH IOL INSERTION;  Surgeon: Higinio Cristina MD;  Location: [facility];  Service: Ophthalmology;  Laterality: Left;    CEREBRAL ANEURYSM REPAIR      clip    DENTAL SURGERY      EYE SURGERY Bilateral     cataract removal and lens implants    HIP ARTHROPLASTY Left 2023    Procedure: TOTAL HIP ARTHROPLASTY;  Surgeon: Juan Wan MD;  Location: [address];  Service: Orthopedics;  Laterality: Left;    TONSILLECTOMY          Sedation History:    Denies any adverse reactions.  Denies problems laying flat.    Social History:  Social History     Tobacco Use    Smoking status: Former     Current packs/day: 0.00     Average packs/day: 0.5 packs/day for 20.0 years (10.0 ttl pk-yrs)     Types: Cigarettes     Start date: 1979     Quit date: 1999     Years since quittin.3     Passive exposure: Past    Smokeless tobacco: Never   Substance Use Topics    Alcohol use: Yes     Alcohol/week: 7.0 standard drinks of alcohol     Types: 7 Glasses of wine per week     Comment: One glass of Red wine prior to dinner    Drug use: No        Home Medications:   Prior to Admission medications    Medication Sig Start Date End Date Taking? Authorizing Provider   acetaminophen (TYLENOL) 650 MG TbSR Take 1 tablet (650 mg total) by mouth every 6 to 8 hours as needed (pain (mild-moderate)). 10/23/23  Yes Alina Alston PA-C   allopurinoL (ZYLOPRIM) 100 MG tablet Take 2 tablets (200 mg  total) by mouth once daily. 8/22/23  Yes Juan Naranjo MD   amLODIPine (NORVASC) 5 MG tablet Take 1 tablet (5 mg total) by mouth once daily. 2/27/24  Yes Andrew Jacinto MD   aspirin (ECOTRIN) 81 MG EC tablet Take 81 mg by mouth once daily.   Yes Provider, Historical   atorvastatin (LIPITOR) 80 MG tablet TAKE 1 TABLET BY MOUTH EVERY DAY 6/28/23  Yes Effie Olmedo MD   bacitracin 500 unit/gram ointment Apply topically 3 (three) times daily.  Patient taking differently: Apply topically 3 (three) times daily as needed (Skin injuries). 12/29/23  Yes Alejo Bonilla DO   carvediloL (COREG) 6.25 MG tablet Take 1 tablet (6.25 mg total) by mouth 2 (two) times daily with meals. 3/5/24 3/5/25 Yes Michael Lal MD   celecoxib (CELEBREX) 200 MG capsule Take 1 capsule (200 mg total) by mouth daily as needed for Pain. 3/28/24  Yes Makayla Stock, DO   colchicine (COLCRYS) 0.6 mg tablet Take 1 tablet (0.6 mg total) by mouth once daily. As needed for gout 1/17/24 1/16/25 Yes Effie Olmedo MD   cyanocobalamin 1,000 mcg/mL injection Inject 1mL intramuscularly once weekly. 4/29/24  Yes Makayla Stock, DO   fluticasone furoate-vilanteroL (BREO) 100-25 mcg/dose diskus inhaler Inhale 1 puff into the lungs once daily. Controller 11/27/23 11/26/24 Yes Effie Olmedo MD   fluticasone propionate (FLONASE) 50 mcg/actuation nasal spray SPRAY 2 pumps INTO EACH NOSTRIL ONCE DAILY 5/13/24  Yes Andrew Jacinto MD   furosemide (LASIX) 20 MG tablet Take 1 tablet (20 mg total) by mouth daily as needed (Leg swelling, SOB, Weight gain 3lb in 1 day or 5 lbs in 2 days.). 12/29/23 12/28/24 Yes Alejo Bonilla DO   hydroCHLOROthiazide (HYDRODIURIL) 25 MG tablet Take 1 tablet (25 mg total) by mouth once daily. 7/3/23 7/2/24 Yes Michael Lal MD   montelukast (SINGULAIR) 10 mg tablet TAKE 1 TABLET BY MOUTH EVERY DAY IN THE EVENING 5/6/24  Yes Makayla Stock DO   pantoprazole (PROTONIX) 40 MG tablet Take 1 tablet (40 mg total) by mouth once  "daily. 2/19/24 5/19/24 Yes Makayla Stock, DO   prenatal no122/iron/folic acid (PRENATAL MULTI ORAL) Take 1 tablet by mouth once daily.   Yes Provider, Historical   QUEtiapine (SEROQUEL) 50 MG tablet Take 1 tablet (50 mg total) by mouth every evening. 4/17/24  Yes Danis Gallegos MD   sodium chloride (SALINE MIST NASL) Apply to each nostril daily for dryness   Yes Provider, Historical   sodium chloride-aloe vera (SALINE NASAL, ALOE VERA,) Gel Apply to each nostril daily for dryness   Yes Provider, Historical   tiotropium bromide (SPIRIVA RESPIMAT) 2.5 mcg/actuation inhaler INHALE 2 PUFFS INTO THE LUNGS ONCE DAILY. CONTROLLER 5/1/24  Yes Effie Olmedo MD   COMIRNATY 2023-24, 12Y UP,,PF, 30 mcg/0.3 mL inection  9/30/23   Provider, Historical   FLUAD QUAD 2023-24,65Y UP,,PF, 60 mcg (15 mcg x 4)/0.5 mL Syrg  9/30/23   Provider, Historical   needle, disp, 30 gauge 30 gauge x 1/2" Ndle Use a new needle once, use new needle daily for the first week, then a new needle once weekly for 8 weeks. 3/26/24   Andrew Jacinto MD   ORTHOVISC 30 mg/2 mL  9/18/23   Provider, Historical   syringe with needle (SYRINGE 3CC/22GX1") 3 mL 22 gauge x 1" Syrg 1 mL by Misc.(Non-Drug; Combo Route) route every 30 days. 4/2/24   Effie Olmedo MD   VENOFER 100 mg iron/5 mL injection  10/6/23   Provider, Historical   albuterol-ipratropium (DUO-NEB) 2.5 mg-0.5 mg/3 mL nebulizer solution Take 3 mLs by nebulization every 6 (six) hours as needed for Wheezing. Rescue 12/29/23 5/14/24  Alejo Bonilla, DO   aluminum & magnesium hydroxide-simethicone (MYLANTA MAX STRENGTH) 400-400-40 mg/5 mL suspension Take 30 mLs by mouth every 6 (six) hours as needed. 12/29/23 5/14/24  Alejo Bonilla, DO   calcium carbonate (TUMS) 200 mg calcium (500 mg) chewable tablet Take 2 tablets (1,000 mg total) by mouth 2 (two) times daily as needed for Heartburn. 12/29/23 5/14/24  Alejo Bonilla,    folic acid (FOLVITE) 1 MG tablet Take 1 tablet (1 mg total) by mouth once " daily. 11/1/23 5/14/24  Adan Cabrera MD   melatonin (MELATIN) 3 mg tablet Take 2 tablets (6 mg total) by mouth nightly as needed for Insomnia. 5/30/23 5/14/24  Angela Nolan MD   multivitamin (THERAGRAN) tablet Take 1 tablet by mouth once daily. 11/1/23 5/14/24  Adan Cabrera MD       Inpatient Medications:    Current Facility-Administered Medications:     0.9%  NaCl infusion (for blood administration), , Intravenous, Q24H PRN, Checo Lara PA-C    Current Outpatient Medications:     acetaminophen (TYLENOL) 650 MG TbSR, Take 1 tablet (650 mg total) by mouth every 6 to 8 hours as needed (pain (mild-moderate))., Disp: 120 tablet, Rfl: 0    allopurinoL (ZYLOPRIM) 100 MG tablet, Take 2 tablets (200 mg total) by mouth once daily., Disp: 180 tablet, Rfl: 3    amLODIPine (NORVASC) 5 MG tablet, Take 1 tablet (5 mg total) by mouth once daily., Disp: 90 tablet, Rfl: 3    aspirin (ECOTRIN) 81 MG EC tablet, Take 81 mg by mouth once daily., Disp: , Rfl:     atorvastatin (LIPITOR) 80 MG tablet, TAKE 1 TABLET BY MOUTH EVERY DAY, Disp: 90 tablet, Rfl: 3    bacitracin 500 unit/gram ointment, Apply topically 3 (three) times daily. (Patient taking differently: Apply topically 3 (three) times daily as needed (Skin injuries).), Disp: , Rfl: 0    carvediloL (COREG) 6.25 MG tablet, Take 1 tablet (6.25 mg total) by mouth 2 (two) times daily with meals., Disp: 180 tablet, Rfl: 3    celecoxib (CELEBREX) 200 MG capsule, Take 1 capsule (200 mg total) by mouth daily as needed for Pain., Disp: 30 capsule, Rfl: 2    colchicine (COLCRYS) 0.6 mg tablet, Take 1 tablet (0.6 mg total) by mouth once daily. As needed for gout, Disp: 30 tablet, Rfl: 11    cyanocobalamin 1,000 mcg/mL injection, Inject 1mL intramuscularly once weekly., Disp: 30 mL, Rfl: 0    fluticasone furoate-vilanteroL (BREO) 100-25 mcg/dose diskus inhaler, Inhale 1 puff into the lungs once daily. Controller, Disp: 30 each, Rfl: 11    fluticasone propionate (FLONASE) 50  "mcg/actuation nasal spray, SPRAY 2 pumps INTO EACH NOSTRIL ONCE DAILY, Disp: 16 mL, Rfl: 3    furosemide (LASIX) 20 MG tablet, Take 1 tablet (20 mg total) by mouth daily as needed (Leg swelling, SOB, Weight gain 3lb in 1 day or 5 lbs in 2 days.)., Disp: 30 tablet, Rfl: 11    hydroCHLOROthiazide (HYDRODIURIL) 25 MG tablet, Take 1 tablet (25 mg total) by mouth once daily., Disp: 90 tablet, Rfl: 3    montelukast (SINGULAIR) 10 mg tablet, TAKE 1 TABLET BY MOUTH EVERY DAY IN THE EVENING, Disp: 90 tablet, Rfl: 6    pantoprazole (PROTONIX) 40 MG tablet, Take 1 tablet (40 mg total) by mouth once daily., Disp: 90 tablet, Rfl: 0    prenatal no122/iron/folic acid (PRENATAL MULTI ORAL), Take 1 tablet by mouth once daily., Disp: , Rfl:     QUEtiapine (SEROQUEL) 50 MG tablet, Take 1 tablet (50 mg total) by mouth every evening., Disp: 90 tablet, Rfl: 1    sodium chloride (SALINE MIST NASL), Apply to each nostril daily for dryness, Disp: , Rfl:     sodium chloride-aloe vera (SALINE NASAL, ALOE VERA,) Gel, Apply to each nostril daily for dryness, Disp: , Rfl:     tiotropium bromide (SPIRIVA RESPIMAT) 2.5 mcg/actuation inhaler, INHALE 2 PUFFS INTO THE LUNGS ONCE DAILY. CONTROLLER, Disp: 4 g, Rfl: 2    COMIRNATY 2023-24, 12Y UP,,PF, 30 mcg/0.3 mL inection, , Disp: , Rfl:     FLUAD QUAD 2023-24,65Y UP,,PF, 60 mcg (15 mcg x 4)/0.5 mL Syrg, , Disp: , Rfl:     needle, disp, 30 gauge 30 gauge x 1/2" Ndle, Use a new needle once, use new needle daily for the first week, then a new needle once weekly for 8 weeks., Disp: 30 each, Rfl: 0    ORTHOVISC 30 mg/2 mL, , Disp: , Rfl:     syringe with needle (SYRINGE 3CC/22GX1") 3 mL 22 gauge x 1" Syrg, 1 mL by Misc.(Non-Drug; Combo Route) route every 30 days., Disp: 15 each, Rfl: 0    VENOFER 100 mg iron/5 mL injection, , Disp: , Rfl:     Facility-Administered Medications Ordered in Other Encounters:     mupirocin 2 % ointment, , Nasal, On Call Procedure, Kang Diaz MD, Given at 10/25/23 1045    " tranexamic acid (CYKLOKAPRON) 1,000 mg in sodium chloride 0.9 % 100 mL IVPB (MB+), 1,000 mg, Intravenous, Once, Kang Diaz MD    tranexamic acid (CYKLOKAPRON) 1,000 mg in sodium chloride 0.9 % 100 mL IVPB (MB+), 1,000 mg, Intravenous, Once, Kang Diaz MD     Anticoagulants/Antiplatelets:   aspirin    Allergies:   Review of patient's allergies indicates:  No Known Allergies    Review of Systems:   As documented in primary provider H&P.    Vital Signs (Most Recent):  Temp: 97.1 °F (36.2 °C) (05/14/24 1601)  Pulse: 98 (05/14/24 1708)  Resp: 17 (05/14/24 1708)  BP: 128/60 (05/14/24 1708)  SpO2: 95 % (05/14/24 1708)    Physical Exam:  Alert, responsive; no acute distress  Tachycardic, regular rhythm  Breathing unlabored  Abdomen benign  Extremities warm and well perfused    Sedation Exam:  ASA: II - Patient appears to have mild systemic disease, adequately controlled   Mallampati: II (hard and soft palate, upper portion of tonsils anduvula visible)     Laboratory:  Lab Results   Component Value Date    INR 1.1 05/14/2024       Lab Results   Component Value Date    WBC 22.37 (H) 05/14/2024    HGB 5.9 (LL) 05/14/2024    HCT 22 (L) 05/14/2024    MCV 90 05/14/2024     (H) 05/14/2024      Lab Results   Component Value Date     (H) 05/14/2024     (L) 05/14/2024    K 3.1 (L) 05/14/2024    CL 98 05/14/2024    CO2 21 (L) 05/14/2024    BUN 27 (H) 05/14/2024    CREATININE 1.2 05/14/2024    CALCIUM 8.1 (L) 05/14/2024    MG 1.1 (L) 05/14/2024    ALT 19 05/14/2024    AST 32 05/14/2024    ALBUMIN 2.7 (L) 05/14/2024    BILITOT 0.6 05/14/2024       Imaging:  Reviewed.      ASSESSMENT/PLAN:   Jessica Floyd is a 73 year old female with pmh of CAD, GERD, COPD, CKD, HFpEF, HRN and HLD who presented to [facility] for weakness in the setting of GI bleeding. CTA GIB positive for active bleeding at the level of the cecum. IR has been consulted for further management.    -Will proceed with angiogram and possible  embolization for acute GI bleed under anesthesia (Class A)  -Pertinent labs and imaging reviewed     -Risks and benefits of the procedure were discussed with the patient and her . Informed consent was obtained.                   Sedation Plan: per anesthesia  Patient will undergo: visceral angiogram and possible embolization      Emery Harman MD  [facility]

## 2024-05-14 NOTE — ED TRIAGE NOTES
Jessica Floyd, a 73 y.o. female presents to the ED w/ complaint of dark stool, that is thick and chunky. Pt is taking iron supplements.     Triage note:  Chief Complaint   Patient presents with    Melena     Arrives via EMS from home, diarrhea x 2 days, Had BM last night with dark blood in it, had iron infusion on Monday, denies pain     Review of patient's allergies indicates:  No Known Allergies  Past Medical History:   Diagnosis Date    Allergic rhinitis     Amblyopia     Carotid stenosis     Coronary atherosclerosis     Outside [facility] 6/2014: 20% mLAD, 50% mCx, 20%, mRCA    Dyslipidemia     ESBL (extended spectrum beta-lactamase) producing bacteria infection     UTI    Hypertension     Nausea and vomiting 05/26/2017    Pulmonary emphysema 10/28/2023    SAH (subarachnoid hemorrhage) 08/24/2016 1999, s/p clipping    Subarachnoid hemorrhage due to ruptured aneurysm 1999

## 2024-05-14 NOTE — PLAN OF CARE
Pt arrived to IR room 188 via stretcher w/ RN on monitor. AAO x4. Name//allergies/procedure verified. Pt will be monitored by Rn & CRNA @ bedside throughout procedure/sedation. Sedation/airway/vs per anesthesia team

## 2024-05-14 NOTE — ASSESSMENT & PLAN NOTE
History of syncope. Thought to be reflex mediated. Had pre-syncopal episode at home after having diarrhea but before she noticed active bleeding.

## 2024-05-14 NOTE — SUBJECTIVE & OBJECTIVE
Past Medical History:   Diagnosis Date    Allergic rhinitis     Amblyopia     Carotid stenosis     Coronary atherosclerosis     Outside [facility] 2014: 20% mLAD, 50% mCx, 20%, mRCA    Dyslipidemia     ESBL (extended spectrum beta-lactamase) producing bacteria infection     UTI    Hypertension     Nausea and vomiting 2017    Pulmonary emphysema 10/28/2023    SAH (subarachnoid hemorrhage) 2016, s/p clipping    Subarachnoid hemorrhage due to ruptured aneurysm        Past Surgical History:   Procedure Laterality Date    ADENOIDECTOMY      ANTERIOR CRUCIATE LIGAMENT REPAIR      APPENDECTOMY      CATARACT EXTRACTION W/  INTRAOCULAR LENS IMPLANT Right 2022    Procedure: EXTRACTION, CATARACT, WITH IOL INSERTION;  Surgeon: Higinio Cristina MD;  Location: [facility];  Service: Ophthalmology;  Laterality: Right;    CATARACT EXTRACTION W/  INTRAOCULAR LENS IMPLANT Left 2022    Procedure: EXTRACTION, CATARACT, WITH IOL INSERTION;  Surgeon: Higinio Cristina MD;  Location: [facility] OR;  Service: Ophthalmology;  Laterality: Left;    CEREBRAL ANEURYSM REPAIR      clip    DENTAL SURGERY      EYE SURGERY Bilateral     cataract removal and lens implants    HIP ARTHROPLASTY Left 2023    Procedure: TOTAL HIP ARTHROPLASTY;  Surgeon: Juan Wan MD;  Location: [address];  Service: Orthopedics;  Laterality: Left;    TONSILLECTOMY         Review of patient's allergies indicates:  No Known Allergies    Family History       Problem Relation (Age of Onset)    Alcohol abuse Father    Aneurysm Mother    Gout Brother    Heart disease Brother    Hypertension Mother, Father    Kidney disease Brother    No Known Problems Daughter, Daughter, Daughter          Tobacco Use    Smoking status: Former     Current packs/day: 0.00     Average packs/day: 0.5 packs/day for 20.0 years (10.0 ttl pk-yrs)     Types: Cigarettes     Start date: 1979     Quit date: 1999     Years since quittin.3      Passive exposure: Past    Smokeless tobacco: Never   Substance and Sexual Activity    Alcohol use: Yes     Alcohol/week: 7.0 standard drinks of alcohol     Types: 7 Glasses of wine per week     Comment: One glass of Red wine prior to dinner    Drug use: No    Sexual activity: Yes     Partners: Male      Review of Systems   Constitutional:  Negative for chills and fever.   HENT:  Positive for nosebleeds. Negative for rhinorrhea, sore throat and trouble swallowing.    Eyes:  Negative for visual disturbance.   Respiratory:  Positive for shortness of breath. Negative for cough and wheezing.    Cardiovascular:  Negative for chest pain and leg swelling.   Gastrointestinal:  Positive for blood in stool and diarrhea. Negative for abdominal distention, abdominal pain, nausea and vomiting.   Endocrine: Positive for cold intolerance.   Genitourinary:  Negative for dysuria, hematuria and pelvic pain.   Musculoskeletal:  Negative for back pain.   Skin:  Positive for pallor.   Neurological:  Positive for syncope, weakness and light-headedness.   Psychiatric/Behavioral:  Negative for confusion and decreased concentration.      Objective:     Vital Signs (Most Recent):  Temp: 97.1 °F (36.2 °C) (05/14/24 1601)  Pulse: 103 (05/14/24 1632)  Resp: 20 (05/14/24 1632)  BP: (!) 86/53 (05/14/24 1632)  SpO2: 96 % (05/14/24 1632) Vital Signs (24h Range):  Temp:  [96.8 °F (36 °C)-97.1 °F (36.2 °C)] 97.1 °F (36.2 °C)  Pulse:  [] 103  Resp:  [16-20] 20  SpO2:  [95 %-97 %] 96 %  BP: ()/(47-70) 86/53      There is no height or weight on file to calculate BMI.      Intake/Output Summary (Last 24 hours) at 5/14/2024 1710  Last data filed at 5/14/2024 1707  Gross per 24 hour   Intake 1343.96 ml   Output --   Net 1343.96 ml          Physical Exam  Vitals and nursing note reviewed.   Constitutional:       General: She is not in acute distress.     Comments: Lying in bed in trendelenberg position   HENT:      Head: Normocephalic and  atraumatic.      Mouth/Throat:      Mouth: Mucous membranes are dry.      Pharynx: Oropharynx is clear.   Eyes:      General: No scleral icterus.     Extraocular Movements: Extraocular movements intact.      Conjunctiva/sclera: Conjunctivae normal.   Cardiovascular:      Rate and Rhythm: Normal rate and regular rhythm.      Heart sounds: Normal heart sounds. No murmur heard.  Pulmonary:      Effort: Pulmonary effort is normal. No respiratory distress.   Abdominal:      General: Abdomen is flat. There is no distension.      Palpations: Abdomen is soft.      Tenderness: There is no abdominal tenderness. There is no guarding or rebound.   Musculoskeletal:         General: No tenderness.      Cervical back: Normal range of motion and neck supple.      Right lower leg: No edema.      Left lower leg: No edema.   Skin:     General: Skin is warm and dry.      Coloration: Skin is pale. Skin is not jaundiced.   Neurological:      General: No focal deficit present.      Mental Status: She is alert and oriented to person, place, and time. Mental status is at baseline.            Vents:     Lines/Drains/Airways       Peripheral Intravenous Line  Duration                  Peripheral IV - Single Lumen 05/14/24 1219 20 G Left Antecubital <1 day         Peripheral IV - Single Lumen 05/14/24 1601 22 G Right Antecubital <1 day                  Significant Labs:    CBC/Anemia Profile:  Recent Labs   Lab 05/14/24  1256 05/14/24  1303   WBC 22.37*  --    HGB 5.9*  --    HCT 19.1* 22*   *  --    MCV 90  --    RDW 22.7*  --         Chemistries:  Recent Labs   Lab 05/14/24  1256   *   K 3.1*   CL 98   CO2 21*   BUN 27*   CREATININE 1.2   CALCIUM 8.1*   ALBUMIN 2.7*   PROT 6.2   BILITOT 0.6   ALKPHOS 70   ALT 19   AST 32   MG 1.1*       All pertinent labs within the past 24 hours have been reviewed.    Significant Imaging: I have reviewed all pertinent imaging results/findings within the past 24 hours.

## 2024-05-14 NOTE — ASSESSMENT & PLAN NOTE
April 2022     No evidence of hemodynamically significant stenosis is demonstrated in the extracranial carotid arteries.  Patent right carotid stent

## 2024-05-14 NOTE — TELEPHONE ENCOUNTER
Spoke to  , she had a iron infusion Friday. She is currently weak , unable to stand, they believe she has blood in her stool ( she said it looks like liver) , she has no pain and but they are wondering if she has iron toxicity or a side effect . Informed patient and spouse message [facility].

## 2024-05-14 NOTE — ED NOTES
I-STAT Chem-8+ Results:   Value Reference Range   Sodium 135 136-145 mmol/L   Potassium  3.1 3.5-5.1 mmol/L   Chloride 99  mmol/L   Ionized Calcium 0.96 1.06-1.42 mmol/L   CO2 (measured) 23 23-29 mmol/L   Glucose 127  mg/dL   BUN 26 6-30 mg/dL   Creatinine 1.3 0.5-1.4 mg/dL   Hematocrit 22 36-54%

## 2024-05-14 NOTE — ASSESSMENT & PLAN NOTE
Hypertension Medications                    amLODIPine (NORVASC) 5 MG tablet TAKE 1 TABLET BY MOUTH EVERY DAY     carvediloL (COREG) 6.25 MG tablet Take 1 tablet (6.25 mg total) by mouth 2 (two) times daily with meals.     hydroCHLOROthiazide (HYDRODIURIL) 25 MG tablet Take 1 tablet (25 mg total) by mouth once daily.       -Will hold antihypertensives in setting of acute bleed and hypotension

## 2024-05-14 NOTE — PHARMACY MED REC
"Admission Medication History     The home medication history was taken by [facility].    You may go to "Admission" then "Reconcile Home Medications" tabs to review and/or act upon these items.     The home medication list has been updated by the Pharmacy department.   Please read ALL comments highlighted in yellow.   Please address this information as you see fit.    Feel free to contact us if you have any questions or require assistance.      The medications listed below were removed from the home medication list. Please reorder if appropriate:  Patient reports no longer taking the following medication(s):  ALBUTEROL-IPRATROPIUM 2.5 MG-0.5 MG/3 ML NEB  ALUMINUM & MAGNESIUM HYDROXIDE-SIMETHICONE 400-400-40 MG/5 ML SUSP  CALCIUM CARBONATE 200 MG/500 MG  FOLIC ACID 1 MG  MELATONIN 3 MG  MULTIVITAMIN TAB    Medications listed below were obtained from: Patient and Analytic software- [employer]  Current Outpatient Medications on File Prior to Encounter   Medication Sig    acetaminophen (TYLENOL) 650 MG TbSR Take 1 tablet (650 mg total) by mouth every 6 to 8 hours as needed pain (mild-moderate).    allopurinoL (ZYLOPRIM) 100 MG tablet Take 2 tablets (200 mg total) by mouth once daily.    amLODIPine (NORVASC) 5 MG tablet Take 1 tablet (5 mg total) by mouth once daily.    aspirin (ECOTRIN) 81 MG EC tablet Take 81 mg by mouth once daily.    atorvastatin (LIPITOR) 80 MG tablet Take 1 tablet by mouth every day.    bacitracin 500 unit/gram ointment Apply topically 3 (three) times daily as needed (Skin injuries).    carvediloL (COREG) 6.25 MG tablet Take 1 tablet (6.25 mg total) by mouth 2 (two) times daily with meals.    celecoxib (CELEBREX) 200 MG capsule Take 1 capsule (200 mg total) by mouth daily as needed for Pain.    colchicine (COLCRYS) 0.6 mg tablet Take 1 tablet (0.6 mg total) by mouth once daily. As needed for gout    cyanocobalamin 1,000 mcg/mL injection Inject 1mL intramuscularly once weekly.    fluticasone " "furoate-vilanteroL (BREO) 100-25 mcg/dose diskus inhaler Inhale 1 puff into the lungs once daily. Controller    fluticasone propionate (FLONASE) 50 mcg/actuation nasal spray Spray 2 pumps into each nostril once daily.    furosemide (LASIX) 20 MG tablet Take 1 tablet (20 mg total) by mouth daily as needed (Leg swelling, SOB, Weight gain 3lb in 1 day or 5 lbs in 2 days.).    hydroCHLOROthiazide (HYDRODIURIL) 25 MG tablet Take 1 tablet (25 mg total) by mouth once daily.    montelukast (SINGULAIR) 10 mg tablet Take 1 tablet by mouth every day in the evening.    pantoprazole (PROTONIX) 40 MG tablet Take 1 tablet (40 mg total) by mouth once daily.    prenatal no122/iron/folic acid (PRENATAL MULTI ORAL) Take 1 tablet by mouth once daily.    QUEtiapine (SEROQUEL) 50 MG tablet Take 1 tablet (50 mg total) by mouth every evening.    sodium chloride (SALINE MIST NASL) Apply to each nostril daily for dryness    sodium chloride-aloe vera (SALINE NASAL, ALOE VERA,) Gel Apply to each nostril daily for dryness    tiotropium bromide (SPIRIVA RESPIMAT) 2.5 mcg/actuation inhaler Inhale 2 puffs into the lungs once daily. Controller.    COMIRNATY 2023-24, 12Y UP,,PF, 30 mcg/0.3 mL inection     FLUAD QUAD 2023-24,65Y UP,,PF, 60 mcg (15 mcg x 4)/0.5 mL Syrg     needle, disp, 30 gauge 30 gauge x 1/2" Ndle Use a new needle once, use new needle daily for the first week, then a new needle once weekly for 8 weeks.    ORTHOVISC 30 mg/2 mL     syringe with needle (SYRINGE 3CC/22GX1") 3 mL 22 gauge x 1" Syrg 1 mL by Misc.(Non-Drug; Combo Route) route every 30 days.    VENOFER 100 mg iron/5 mL injection                [facility] a[facility]rtinez  EXT [id number]                  .          "

## 2024-05-14 NOTE — LETTER
Sandie Floyd accompanied his wife to the hospital on 5/14/2024. Mr. Floyd is the patient's primary caregiver and will need to provide around the clock care for the patient when she is discharged.     If you have any questions or concerns, please don't hesitate to call.      Dr. Kristen Archer MD

## 2024-05-14 NOTE — H&P
"  [facility] - [facility] Dept  Critical Care Medicine  History & Physical    Patient Name: Jessica Floyd  MRN: [id number]  Admission Date: 5/14/2024  Hospital Length of Stay: 0 days  Code Status: Prior  Attending Physician: Sachin Rich*   Primary Care Provider: Effie Olmedo MD   Principal Problem: GI bleed  Inpatient consult to Critical Care Medicine  Consult performed by: Ene Urrutia DO  Consult ordered by: Checo Lara PA-C           Subjective:     HPI:  Mrs. Floyd is a 73 year old female with PMH notable for COPD on home 2L NC, pulm HTN, R carotid stent on ASA, HFpEF, anemia, and ERIK on infusions who presented to [facility] ED with the melena.  is at bedside and reports that the patient had an episode of diarrhea and lethargy yesterday. She reports that it felt as if "she was going to pass out", so she was using her walker to get around. Patient's  reports that this has never happened before. Patient denies excessive OTC NSAID use. She reports that she drinks 2 wine glasses a day. This morning when the patient woke up she felt very lethargic. Patient went to use the bathroom, and  noted bright red stool in the bowl. Patient reported a pre syncopal episode and felt lightheaded. She admits to weakness, SOB, light-headedness, hematochezia, and pre syncope. Patient denies nausea, vomiting, hematemesis, falls, confusion, chest pain, urinary changes, and fevers. Patient reports taking aspirin daily but no DOAC.     Hemoglobin 5.9 at presentation to ED (baseline ~10). GI and IR consulted given concern for active bleed seen on CTA. In the emergency department she was given 1L IVF and a 80 mg IV protonix bolus. Critical care medicine. consulted for GIB. CTA in the Ed revealed "Acute gastrointestinal bleed at the level of the cecum". IR planning for embolization today. Patient is also receiving 2 pRBC due to acute anemia.     Hospital/ICU Course:  No notes on file     Past Medical History: "   Diagnosis Date    Allergic rhinitis     Amblyopia     Carotid stenosis     Coronary atherosclerosis     Outside [facility] 2014: 20% mLAD, 50% mCx, 20%, mRCA    Dyslipidemia     ESBL (extended spectrum beta-lactamase) producing bacteria infection     UTI    Hypertension     Nausea and vomiting 2017    Pulmonary emphysema 10/28/2023    SAH (subarachnoid hemorrhage) 2016, s/p clipping    Subarachnoid hemorrhage due to ruptured aneurysm        Past Surgical History:   Procedure Laterality Date    ADENOIDECTOMY      ANTERIOR CRUCIATE LIGAMENT REPAIR      APPENDECTOMY      CATARACT EXTRACTION W/  INTRAOCULAR LENS IMPLANT Right 2022    Procedure: EXTRACTION, CATARACT, WITH IOL INSERTION;  Surgeon: Higinio Cristina MD;  Location: [facility];  Service: Ophthalmology;  Laterality: Right;    CATARACT EXTRACTION W/  INTRAOCULAR LENS IMPLANT Left 2022    Procedure: EXTRACTION, CATARACT, WITH IOL INSERTION;  Surgeon: Higinio Cristina MD;  Location: [facility];  Service: Ophthalmology;  Laterality: Left;    CEREBRAL ANEURYSM REPAIR      clip    DENTAL SURGERY      EYE SURGERY Bilateral     cataract removal and lens implants    HIP ARTHROPLASTY Left 2023    Procedure: TOTAL HIP ARTHROPLASTY;  Surgeon: Juan Wan MD;  Location: [address];  Service: Orthopedics;  Laterality: Left;    TONSILLECTOMY         Review of patient's allergies indicates:  No Known Allergies    Family History       Problem Relation (Age of Onset)    Alcohol abuse Father    Aneurysm Mother    Gout Brother    Heart disease Brother    Hypertension Mother, Father    Kidney disease Brother    No Known Problems Daughter, Daughter, Daughter          Tobacco Use    Smoking status: Former     Current packs/day: 0.00     Average packs/day: 0.5 packs/day for 20.0 years (10.0 ttl pk-yrs)     Types: Cigarettes     Start date: 1979     Quit date: 1999     Years since quittin.3     Passive exposure: Past     Smokeless tobacco: Never   Substance and Sexual Activity    Alcohol use: Yes     Alcohol/week: 7.0 standard drinks of alcohol     Types: 7 Glasses of wine per week     Comment: One glass of Red wine prior to dinner    Drug use: No    Sexual activity: Yes     Partners: Male      Review of Systems   Constitutional:  Negative for chills and fever.   HENT:  Positive for nosebleeds. Negative for rhinorrhea, sore throat and trouble swallowing.    Eyes:  Negative for visual disturbance.   Respiratory:  Positive for shortness of breath. Negative for cough and wheezing.    Cardiovascular:  Negative for chest pain and leg swelling.   Gastrointestinal:  Positive for blood in stool and diarrhea. Negative for abdominal distention, abdominal pain, nausea and vomiting.   Endocrine: Positive for cold intolerance.   Genitourinary:  Negative for dysuria, hematuria and pelvic pain.   Musculoskeletal:  Negative for back pain.   Skin:  Positive for pallor.   Neurological:  Positive for syncope, weakness and light-headedness.   Psychiatric/Behavioral:  Negative for confusion and decreased concentration.      Objective:     Vital Signs (Most Recent):  Temp: 97.1 °F (36.2 °C) (05/14/24 1601)  Pulse: 103 (05/14/24 1632)  Resp: 20 (05/14/24 1632)  BP: (!) 86/53 (05/14/24 1632)  SpO2: 96 % (05/14/24 1632) Vital Signs (24h Range):  Temp:  [96.8 °F (36 °C)-97.1 °F (36.2 °C)] 97.1 °F (36.2 °C)  Pulse:  [] 103  Resp:  [16-20] 20  SpO2:  [95 %-97 %] 96 %  BP: ()/(47-70) 86/53      There is no height or weight on file to calculate BMI.      Intake/Output Summary (Last 24 hours) at 5/14/2024 1710  Last data filed at 5/14/2024 1707  Gross per 24 hour   Intake 1343.96 ml   Output --   Net 1343.96 ml          Physical Exam  Vitals and nursing note reviewed.   Constitutional:       General: She is not in acute distress.     Comments: Lying in bed in trendelenberg position   HENT:      Head: Normocephalic and atraumatic.      Mouth/Throat:       Mouth: Mucous membranes are dry.      Pharynx: Oropharynx is clear.   Eyes:      General: No scleral icterus.     Extraocular Movements: Extraocular movements intact.      Conjunctiva/sclera: Conjunctivae normal.   Cardiovascular:      Rate and Rhythm: Normal rate and regular rhythm.      Heart sounds: Normal heart sounds. No murmur heard.  Pulmonary:      Effort: Pulmonary effort is normal. No respiratory distress.   Abdominal:      General: Abdomen is flat. There is no distension.      Palpations: Abdomen is soft.      Tenderness: There is no abdominal tenderness. There is no guarding or rebound.   Musculoskeletal:         General: No tenderness.      Cervical back: Normal range of motion and neck supple.      Right lower leg: No edema.      Left lower leg: No edema.   Skin:     General: Skin is warm and dry.      Coloration: Skin is pale. Skin is not jaundiced.   Neurological:      General: No focal deficit present.      Mental Status: She is alert and oriented to person, place, and time. Mental status is at baseline.            Vents:     Lines/Drains/Airways       Peripheral Intravenous Line  Duration                  Peripheral IV - Single Lumen 05/14/24 1219 20 G Left Antecubital <1 day         Peripheral IV - Single Lumen 05/14/24 1601 22 G Right Antecubital <1 day                  Significant Labs:    CBC/Anemia Profile:  Recent Labs   Lab 05/14/24  1256 05/14/24  1303   WBC 22.37*  --    HGB 5.9*  --    HCT 19.1* 22*   *  --    MCV 90  --    RDW 22.7*  --         Chemistries:  Recent Labs   Lab 05/14/24  1256   *   K 3.1*   CL 98   CO2 21*   BUN 27*   CREATININE 1.2   CALCIUM 8.1*   ALBUMIN 2.7*   PROT 6.2   BILITOT 0.6   ALKPHOS 70   ALT 19   AST 32   MG 1.1*       All pertinent labs within the past 24 hours have been reviewed.    Significant Imaging: I have reviewed all pertinent imaging results/findings within the past 24 hours.  Assessment/Plan:     Pulmonary  Pulmonary emphysema  Not  on oxygen at home.     -Continue home inhalers  -Duonebs PRN    Cardiac/Vascular  (HFpEF) heart failure with preserved ejection fraction  Noted in history but last echo reveals normal function. Not on home medications.    Transthoracic echo (TTE) complete 10/29/2023    Interpretation Summary    Left Ventricle: The left ventricle is normal in size. Normal wall thickness. Normal wall motion. There is normal systolic function with a visually estimated ejection fraction of 60 - 65%. There is normal diastolic function.    Right Ventricle: Mild right ventricular enlargement. Wall thickness is normal. Right ventricle wall motion  is normal. Systolic function is normal.    Aortic Valve: The aortic valve is a unicuspid valve. There is mild aortic valve sclerosis. There is trace aortic regurgitation.    Mitral Valve: There is mild regurgitation.    Pulmonary Artery: There is mild pulmonary hypertension. The estimated pulmonary artery systolic pressure is 40 mmHg.    IVC/SVC: Intermediate venous pressure at 8 mmHg.        Hypertension  Hypertension Medications                    amLODIPine (NORVASC) 5 MG tablet TAKE 1 TABLET BY MOUTH EVERY DAY     carvediloL (COREG) 6.25 MG tablet Take 1 tablet (6.25 mg total) by mouth 2 (two) times daily with meals.     hydroCHLOROthiazide (HYDRODIURIL) 25 MG tablet Take 1 tablet (25 mg total) by mouth once daily.       -Will hold antihypertensives in setting of acute bleed and hypotension    Bilateral carotid artery stenosis  April 2022     No evidence of hemodynamically significant stenosis is demonstrated in the extracranial carotid arteries.  Patent right carotid stent    GI  * GI bleed  73 year old female with multiple medical problems presenting with melena and some bright red blood with associated pre-syncope found to have hgb of 5.9 (from baseline ~10). CTA with acute gastrointestinal bleed at the level of the cecum.    -- GI consulted. Appreciate assistance   -- IR consulted, taking  for coiling procedure today 5/14  -- NPO   -- IR consulted   -- 2 u PRBC given in ED   -- Protonix 80 mg IV x 1 and 40 mg BID   -- Hold all A/C and A/P agents   -- Correct any coagulopathy with platelets and FFP for goal of platelets > 50K and INR <2.0   -- Maintain large bore IV access   -- MAP > 65 goal   -- Maintain type and screen   -- Trend CBCs    Other  History Situational (ie Stress Induced) syncope ([facility] admission 12-25-23  History of syncope. Thought to be reflex mediated. Had pre-syncopal episode at home after having diarrhea but before she noticed active bleeding.         Critical Care Daily Checklist:    A: Awake: RASS Goal/Actual Goal:    Actual:     B: Spontaneous Breathing Trial Performed?     C: SAT & SBT Coordinated?  n                      D: Delirium: CAM-ICU     E: Early Mobility Performed? No   F: Feeding Goal:    Status:     Current Diet Order   Procedures    Diet NPO      AS: Analgesia/Sedation n   T: Thromboembolic Prophylaxis n   H: HOB > 300 Yes   U: Stress Ulcer Prophylaxis (if needed) PPI   G: Glucose Control 140-180   B: Bowel Function     I: Indwelling Catheter (Lines & Maldonado) Necessity 2 large bore IV   D: De-escalation of Antimicrobials/Pharmacotherapies n    Plan for the day/ETD IR embolization    Code Status:  Family/Goals of Care: Prior  n       Critical secondary to Patient has a condition that poses threat to life and bodily function: Acute GI bleed    Critical care was time spent personally by me on the following activities: development of treatment plan with patient or surrogate and bedside caregivers, discussions with consultants, evaluation of patient's response to treatment, examination of patient, ordering and performing treatments and interventions, ordering and review of laboratory studies, ordering and review of radiographic studies, pulse oximetry, re-evaluation of patient's condition. This critical care time did not overlap with that of any other provider or  involve time for any procedures.     Ene Urrutia,   Critical Care Medicine  [facility] - Emergency Dept

## 2024-05-14 NOTE — HPI
73 year old female with pmh of CAD, GERD, COPD, CKD, HFpEF, HRN and HLD [place] [facility] [place] onset weakness, dizziness, SOB, and passing of melena. She had been feeling weak/dizzy over the weekend but they became significantly more pronounced last evening, to the point where she briefly fainted. After she had gotten back up, she passed a large volume of  melena with clots; family estimated the amount to be 5 cups. She also became SOB and for the first time in 5 months needed her oxygen. After this, they decided to come to the ED. She has never had a GIB before. She does take celecoxib regularly for arthritic pain. She is a former smoker and reports drinking some red wine. She is not on any anticoagulants. She denies any N/V or abdominal pain.    She states she had a EGD in [place] years ago for GERD, and as far as she can remember it was normal. Her last colonoscopy was in 2021 and it was unremarkable. GI was consulted for melena.

## 2024-05-14 NOTE — SUBJECTIVE & OBJECTIVE
Past Medical History:   Diagnosis Date    Allergic rhinitis     Amblyopia     Carotid stenosis     Coronary atherosclerosis     Outside [facility] 2014: 20% mLAD, 50% mCx, 20%, mRCA    Dyslipidemia     ESBL (extended spectrum beta-lactamase) producing bacteria infection     UTI    Hypertension     Nausea and vomiting 2017    Pulmonary emphysema 10/28/2023    SAH (subarachnoid hemorrhage) 2016, s/p clipping    Subarachnoid hemorrhage due to ruptured aneurysm        Past Surgical History:   Procedure Laterality Date    ADENOIDECTOMY      ANTERIOR CRUCIATE LIGAMENT REPAIR      APPENDECTOMY      CATARACT EXTRACTION W/  INTRAOCULAR LENS IMPLANT Right 2022    Procedure: EXTRACTION, CATARACT, WITH IOL INSERTION;  Surgeon: Higinio Cristina MD;  Location: [facility];  Service: Ophthalmology;  Laterality: Right;    CATARACT EXTRACTION W/  INTRAOCULAR LENS IMPLANT Left 2022    Procedure: EXTRACTION, CATARACT, WITH IOL INSERTION;  Surgeon: Higinio Cristina MD;  Location: [facility];  Service: Ophthalmology;  Laterality: Left;    CEREBRAL ANEURYSM REPAIR      clip    DENTAL SURGERY      EYE SURGERY Bilateral     cataract removal and lens implants    HIP ARTHROPLASTY Left 2023    Procedure: TOTAL HIP ARTHROPLASTY;  Surgeon: Juan Wan MD;  Location: [address];  Service: Orthopedics;  Laterality: Left;    TONSILLECTOMY         Review of patient's allergies indicates:  No Known Allergies  Family History       Problem Relation (Age of Onset)    Alcohol abuse Father    Aneurysm Mother    Gout Brother    Heart disease Brother    Hypertension Mother, Father    Kidney disease Brother    No Known Problems Daughter, Daughter, Daughter          Tobacco Use    Smoking status: Former     Current packs/day: 0.00     Average packs/day: 0.5 packs/day for 20.0 years (10.0 ttl pk-yrs)     Types: Cigarettes     Start date: 1979     Quit date: 1999     Years since quittin.3      Passive exposure: Past    Smokeless tobacco: Never   Substance and Sexual Activity    Alcohol use: Yes     Alcohol/week: 7.0 standard drinks of alcohol     Types: 7 Glasses of wine per week     Comment: One glass of Red wine prior to dinner    Drug use: No    Sexual activity: Yes     Partners: Male     Review of Systems   Constitutional:  Positive for fatigue. Negative for chills and fever.   Respiratory:  Positive for shortness of breath. Negative for chest tightness.    Cardiovascular:  Negative for chest pain.   Gastrointestinal:  Positive for blood in stool and diarrhea. Negative for abdominal distention, abdominal pain, constipation, nausea and vomiting.   Neurological:  Positive for dizziness, weakness and light-headedness.     Objective:     Vital Signs (Most Recent):  Temp: 97.1 °F (36.2 °C) (05/14/24 1601)  Pulse: 109 (05/14/24 1601)  Resp: 18 (05/14/24 1601)  BP: (!) 101/57 (05/14/24 1601)  SpO2: 96 % (05/14/24 1601) Vital Signs (24h Range):  Temp:  [96.8 °F (36 °C)-97.1 °F (36.2 °C)] 97.1 °F (36.2 °C)  Pulse:  [] 109  Resp:  [16-20] 18  SpO2:  [95 %-97 %] 96 %  BP: (101-133)/(57-70) 101/57        There is no height or weight on file to calculate BMI.      Intake/Output Summary (Last 24 hours) at 5/14/2024 1607  Last data filed at 5/14/2024 1432  Gross per 24 hour   Intake 1000 ml   Output --   Net 1000 ml       Lines/Drains/Airways       Peripheral Intravenous Line  Duration                  Peripheral IV - Single Lumen 05/14/24 1219 20 G Left Antecubital <1 day         Peripheral IV - Single Lumen 05/14/24 1601 22 G Right Antecubital <1 day                     Physical Exam  Constitutional:       General: She is not in acute distress.     Appearance: She is ill-appearing.   HENT:      Head: Normocephalic and atraumatic.   Eyes:      General: No scleral icterus.  Cardiovascular:      Rate and Rhythm: Regular rhythm. Tachycardia present.      Heart sounds: Normal heart sounds.   Pulmonary:       Effort: Pulmonary effort is normal.      Breath sounds: Normal breath sounds.      Comments: On 2L NC  Abdominal:      General: Abdomen is flat. There is no distension.      Palpations: Abdomen is soft.      Tenderness: There is no abdominal tenderness. There is no guarding or rebound.   Skin:     General: Skin is warm and dry.      Coloration: Skin is pale.   Neurological:      General: No focal deficit present.      Mental Status: She is alert and oriented to person, place, and time.   Psychiatric:         Mood and Affect: Mood normal.         Behavior: Behavior normal.          Significant Labs:  CBC:   Recent Labs   Lab 05/14/24  1256 05/14/24  1303   WBC 22.37*  --    HGB 5.9*  --    HCT 19.1* 22*   *  --      CMP:   Recent Labs   Lab 05/14/24  1256   *   CALCIUM 8.1*   ALBUMIN 2.7*   PROT 6.2   *   K 3.1*   CO2 21*   CL 98   BUN 27*   CREATININE 1.2   ALKPHOS 70   ALT 19   AST 32   BILITOT 0.6     All pertinent lab results from the last 24 hours have been reviewed.    Significant Imaging:  Imaging results within the past 24 hours have been reviewed.

## 2024-05-14 NOTE — CONSULTS
[facility] - Emergency Dept  Gastroenterology  Consult Note    Patient Name: Jessica Floyd  MRN: [id number]  Admission Date: 5/14/2024  Hospital Length of Stay: 0 days  Code Status: Prior   Attending Provider: Harsha Dawson MD   Consulting Provider: Pablo Kevin DO  Primary Care Physician: Effie Olmedo MD  Principal Problem:<principal problem not specified>    Inpatient consult to Gastroenterology  Consult performed by: Pablo Kevin DO  Consult ordered by: Checo Lara PA-C        Subjective:     HPI:  73 year old female with pmh of CAD, GERD, COPD, CKD, HFpEF, HRN and HLD presents to [facility] for new onset weakness, dizziness, SOB, and passing of melena. She had been feeling weak/dizzy over the weekend but they became significantly more pronounced last evening, to the point where she briefly fainted. After she had gotten back up, she passed a large volume of  melena with clots; family estimated the amount to be 5 cups. She also became SOB and for the first time in 5 months needed her oxygen. After this, they decided to come to the ED. She has never had a GIB before. She does take celecoxib regularly for arthritic pain. She is a former smoker and reports drinking some red wine. She is not on any anticoagulants. She denies any N/V or abdominal pain.    She states she had a EGD in [facility] years ago for GERD, and as far as she can remember it was normal. Her last colonoscopy was in 2021 and it was unremarkable. GI was consulted for melena.     Past Medical History:   Diagnosis Date    Allergic rhinitis     Amblyopia     Carotid stenosis     Coronary atherosclerosis     Outside [facility] 6/2014: 20% mLAD, 50% mCx, 20%, mRCA    Dyslipidemia     ESBL (extended spectrum beta-lactamase) producing bacteria infection     UTI    Hypertension     Nausea and vomiting 05/26/2017    Pulmonary emphysema 10/28/2023    SAH (subarachnoid hemorrhage) 08/24/2016 1999, s/p clipping    Subarachnoid hemorrhage due to ruptured aneurysm 1999        Past Surgical History:   Procedure Laterality Date    ADENOIDECTOMY      ANTERIOR CRUCIATE LIGAMENT REPAIR  2012    APPENDECTOMY      CATARACT EXTRACTION W/  INTRAOCULAR LENS IMPLANT Right 2022    Procedure: EXTRACTION, CATARACT, WITH IOL INSERTION;  Surgeon: Higinio Cristina MD;  Location: [facility];  Service: Ophthalmology;  Laterality: Right;    CATARACT EXTRACTION W/  INTRAOCULAR LENS IMPLANT Left 2022    Procedure: EXTRACTION, CATARACT, WITH IOL INSERTION;  Surgeon: Higinio Cristina MD;  Location: [facility];  Service: Ophthalmology;  Laterality: Left;    CEREBRAL ANEURYSM REPAIR      clip    DENTAL SURGERY      EYE SURGERY Bilateral     cataract removal and lens implants    HIP ARTHROPLASTY Left 2023    Procedure: TOTAL HIP ARTHROPLASTY;  Surgeon: Juan Wan MD;  Location: [address];  Service: Orthopedics;  Laterality: Left;    TONSILLECTOMY         Review of patient's allergies indicates:  No Known Allergies  Family History       Problem Relation (Age of Onset)    Alcohol abuse Father    Aneurysm Mother    Gout Brother    Heart disease Brother    Hypertension Mother, Father    Kidney disease Brother    No Known Problems Daughter, Daughter, Daughter          Tobacco Use    Smoking status: Former     Current packs/day: 0.00     Average packs/day: 0.5 packs/day for 20.0 years (10.0 ttl pk-yrs)     Types: Cigarettes     Start date: 1979     Quit date: 1999     Years since quittin.3     Passive exposure: Past    Smokeless tobacco: Never   Substance and Sexual Activity    Alcohol use: Yes     Alcohol/week: 7.0 standard drinks of alcohol     Types: 7 Glasses of wine per week     Comment: One glass of Red wine prior to dinner    Drug use: No    Sexual activity: Yes     Partners: Male     Review of Systems   Constitutional:  Positive for fatigue. Negative for chills and fever.   Respiratory:  Positive for shortness of breath. Negative for chest tightness.     Cardiovascular:  Negative for chest pain.   Gastrointestinal:  Positive for blood in stool and diarrhea. Negative for abdominal distention, abdominal pain, constipation, nausea and vomiting.   Neurological:  Positive for dizziness, weakness and light-headedness.     Objective:     Vital Signs (Most Recent):  Temp: 97.1 °F (36.2 °C) (05/14/24 1601)  Pulse: 109 (05/14/24 1601)  Resp: 18 (05/14/24 1601)  BP: (!) 101/57 (05/14/24 1601)  SpO2: 96 % (05/14/24 1601) Vital Signs (24h Range):  Temp:  [96.8 °F (36 °C)-97.1 °F (36.2 °C)] 97.1 °F (36.2 °C)  Pulse:  [] 109  Resp:  [16-20] 18  SpO2:  [95 %-97 %] 96 %  BP: (101-133)/(57-70) 101/57        There is no height or weight on file to calculate BMI.      Intake/Output Summary (Last 24 hours) at 5/14/2024 1607  Last data filed at 5/14/2024 1432  Gross per 24 hour   Intake 1000 ml   Output --   Net 1000 ml       Lines/Drains/Airways       Peripheral Intravenous Line  Duration                  Peripheral IV - Single Lumen 05/14/24 1219 20 G Left Antecubital <1 day         Peripheral IV - Single Lumen 05/14/24 1601 22 G Right Antecubital <1 day                     Physical Exam  Constitutional:       General: She is not in acute distress.     Appearance: She is ill-appearing.   HENT:      Head: Normocephalic and atraumatic.   Eyes:      General: No scleral icterus.  Cardiovascular:      Rate and Rhythm: Regular rhythm. Tachycardia present.      Heart sounds: Normal heart sounds.   Pulmonary:      Effort: Pulmonary effort is normal.      Breath sounds: Normal breath sounds.      Comments: On 2L NC  Abdominal:      General: Abdomen is flat. There is no distension.      Palpations: Abdomen is soft.      Tenderness: There is no abdominal tenderness. There is no guarding or rebound.   Skin:     General: Skin is warm and dry.      Coloration: Skin is pale.   Neurological:      General: No focal deficit present.      Mental Status: She is alert and oriented to person, place,  and time.   Psychiatric:         Mood and Affect: Mood normal.         Behavior: Behavior normal.          Significant Labs:  CBC:   Recent Labs   Lab 05/14/24  1256 05/14/24  1303   WBC 22.37*  --    HGB 5.9*  --    HCT 19.1* 22*   *  --      CMP:   Recent Labs   Lab 05/14/24  1256   *   CALCIUM 8.1*   ALBUMIN 2.7*   PROT 6.2   *   K 3.1*   CO2 21*   CL 98   BUN 27*   CREATININE 1.2   ALKPHOS 70   ALT 19   AST 32   BILITOT 0.6     All pertinent lab results from the last 24 hours have been reviewed.    Significant Imaging:  Imaging results within the past 24 hours have been reviewed.  Assessment/Plan:     GI  GI bleed  73 year old female with pmh of CAD, GERD, COPD, CKD, HFpEF, HRN and HLD [place] [facility] [place] new onset weakness, dizziness, SOB, and passing of melena. Last evening she had a large volume episode of melena with clots. She reports feeling SOB and needing O2 for the first time in 5 months as well as dizzy and weak. She regularly takes celocoxib for arthritic pain and enjoys red wine,she is a former smoker. [place] but she reports it being normal. Her last colonoscopy was in 2021 and was unremarkable. Her Hgb on admission was 5.9 and she is receiving 2 units of pRBC.       - Recommend IR consult for embolization given positive CTA (spoke with radiology reading room)  - Keep NPO for now; pending IR intervention  - If IR unable to embolize, plan for colonoscopy tomorrow; CLD today and NPO at midnight. If this is the case, please page GI fellow on call to order bowel prep.  - Trend Hgb q8hrs. Transfuse for Hgb > 7, unless otherwise indicated  - Bolus IV pantoprazole 80mg followed by 40mg BID  - Maintain IV access with 2 large bore Ivs  - Intravascular resuscitation/support with IVFs   - Hold all NSAIDs and anticoagulants, unless contraindicated  - Please correct any coagulopathy with platelets and FFP for goal of platelets >50K and INR <2.0  - Please  notify GI team if there is significant change in patient's clinical status         Thank you for your consult.     Pablo Kevin DO  Gastroenterology  [facility] - Emergency Dept

## 2024-05-14 NOTE — TELEPHONE ENCOUNTER
Pt must be seen in the ED. This is an emergency and cannot be handled in the office setting. Thanks, PV

## 2024-05-14 NOTE — ANESTHESIA PREPROCEDURE EVALUATION
[facility]  Anesthesia Pre-Operative Evaluation         Patient Name: Jessica Floyd  YOB: 1950  MRN: [id number]    SUBJECTIVE:     Pre-operative evaluation for * No procedures listed *     05/14/2024    Jessica Floyd is a 73 y.o. female w/ a significant PMHx of CAD, GERD, COPD, CKD3, HFpEF, and HLD presented to [facility] [place] for new onset weakness, dizziness, SOB, and passing of melena.     Initial H/H in the ED was 5.9/19.1 with /70 and HR 99. She remained stable until approximately 1630 when her BP began to drop into 70s/40s and she became tachycardic to the 100s. During evaluation, patient was persistently hypotensive and had an episode of unresponsiveness that responded to trendelenberg and unit bolus of pRBCs. CTA GI bleed positive for acute GI bleed at the level of the cecum.    Patient now presents for the above procedure(s).    Echo 10/2023    Left Ventricle: The left ventricle is normal in size. Normal wall thickness. Normal wall motion. There is normal systolic function with a visually estimated ejection fraction of 60 - 65%. There is normal diastolic function.    Right Ventricle: Mild right ventricular enlargement. Wall thickness is normal. Right ventricle wall motion  is normal. Systolic function is normal.    Aortic Valve: The aortic valve is a unicuspid valve. There is mild aortic valve sclerosis. There is trace aortic regurgitation.    Mitral Valve: There is mild regurgitation.    Pulmonary Artery: There is mild pulmonary hypertension. The estimated pulmonary artery systolic pressure is 40 mmHg.    IVC/SVC: Intermediate venous pressure at 8 mmHg.      LDA:        Peripheral IV - Single Lumen 05/14/24 1219 20 G Left Antecubital (Active)   Site Assessment Clean;Dry;Intact 05/14/24 1219   Number of days: 0            Peripheral IV - Single Lumen 05/14/24 1601 22 G Right Antecubital (Active)   Site Assessment Clean;Dry;Intact 05/14/24 1601   Line Status Blood return noted  05/14/24 1601   Dressing Status Clean;Dry;Intact 05/14/24 1601   Dressing Intervention First dressing 05/14/24 1601   Number of days: 0       Prev airway:   Placement Date: 05/28/23; Placement Time: 0806 (created via procedure documentation); Method of Intubation: Video Laryngoscopy; Mask Ventilation: Easy; Intubated: Postinduction; Blade: Ibrahim #3; Airway Device Size: 7.0; Placement Verified By: Capnometry; Complicating Factors: None; Intubation Findings: Bilateral breath sounds, Atraumatic/Condition of teeth unchanged; Securment: Lips; Complications: None     Drips: None documented.      Patient Active Problem List   Diagnosis    Coronary artery disease involving native coronary artery of native heart without angina pectoris    Bilateral carotid artery stenosis    Gastroesophageal reflux disease without esophagitis    CKD stage G3a/A1, GFR 45-59 and albumin creatinine ratio <30 mg/g    Hypertension    Subclinical hyperthyroidism    Allergic rhinitis    Iron deficiency anemia secondary to blood loss (chronic)    History of fracture of left hip    Malnutrition of mild degree    Pathological fracture due to osteoporosis    Osteoporosis    Localized osteoporosis of [place]    Postnasal drip    Reduced vision-Left eye    Gout    Right rotator cuff tear arthropathy    SOB (shortness of breath)    Eosinophilia    Anxiety    PAC (premature atrial contraction)    Aortic atherosclerosis    COVID-19    Pulmonary emphysema    Weight loss    Moderate malnutrition    Influenza A    (HFpEF) heart failure with preserved ejection fraction    History Situational (ie Stress Induced) syncope ([facility] admission 12-25-23    Nicotine dependence, cigarettes, in FULL REMISSION:in past smoked for 17yrs from 16y to about 34y / LUPE:F pack a day    GI bleed       Review of patient's allergies indicates:  No Known Allergies    Current Inpatient Medications:   [START ON 5/15/2024] allopurinoL  200 mg Oral Daily    [START ON 5/15/2024]  fluticasone furoate-vilanteroL  1 puff Inhalation Daily       Current Facility-Administered Medications on File Prior to Encounter   Medication Dose Route Frequency Provider Last Rate Last Admin    mupirocin 2 % ointment   Nasal On Call Procedure Kang Diaz MD   Given at 10/25/23 1045    tranexamic acid (CYKLOKAPRON) 1,000 mg in sodium chloride 0.9 % 100 mL IVPB (MB+)  1,000 mg Intravenous Once Kang Diaz MD        tranexamic acid (CYKLOKAPRON) 1,000 mg in sodium chloride 0.9 % 100 mL IVPB (MB+)  1,000 mg Intravenous Once Kang Diaz MD         Current Outpatient Medications on File Prior to Encounter   Medication Sig Dispense Refill    acetaminophen (TYLENOL) 650 MG TbSR Take 1 tablet (650 mg total) by mouth every 6 to 8 hours as needed (pain (mild-moderate)). 120 tablet 0    allopurinoL (ZYLOPRIM) 100 MG tablet Take 2 tablets (200 mg total) by mouth once daily. 180 tablet 3    amLODIPine (NORVASC) 5 MG tablet Take 1 tablet (5 mg total) by mouth once daily. 90 tablet 3    aspirin (ECOTRIN) 81 MG EC tablet Take 81 mg by mouth once daily.      atorvastatin (LIPITOR) 80 MG tablet TAKE 1 TABLET BY MOUTH EVERY DAY 90 tablet 3    bacitracin 500 unit/gram ointment Apply topically 3 (three) times daily. (Patient taking differently: Apply topically 3 (three) times daily as needed (Skin injuries).)  0    carvediloL (COREG) 6.25 MG tablet Take 1 tablet (6.25 mg total) by mouth 2 (two) times daily with meals. 180 tablet 3    celecoxib (CELEBREX) 200 MG capsule Take 1 capsule (200 mg total) by mouth daily as needed for Pain. 30 capsule 2    colchicine (COLCRYS) 0.6 mg tablet Take 1 tablet (0.6 mg total) by mouth once daily. As needed for gout 30 tablet 11    cyanocobalamin 1,000 mcg/mL injection Inject 1mL intramuscularly once weekly. 30 mL 0    fluticasone furoate-vilanteroL (BREO) 100-25 mcg/dose diskus inhaler Inhale 1 puff into the lungs once daily. Controller 30 each 11    fluticasone propionate (FLONASE)  "50 mcg/actuation nasal spray SPRAY 2 pumps INTO EACH NOSTRIL ONCE DAILY 16 mL 3    furosemide (LASIX) 20 MG tablet Take 1 tablet (20 mg total) by mouth daily as needed (Leg swelling, SOB, Weight gain 3lb in 1 day or 5 lbs in 2 days.). 30 tablet 11    hydroCHLOROthiazide (HYDRODIURIL) 25 MG tablet Take 1 tablet (25 mg total) by mouth once daily. 90 tablet 3    montelukast (SINGULAIR) 10 mg tablet TAKE 1 TABLET BY MOUTH EVERY DAY IN THE EVENING 90 tablet 6    pantoprazole (PROTONIX) 40 MG tablet Take 1 tablet (40 mg total) by mouth once daily. 90 tablet 0    prenatal no122/iron/folic acid (PRENATAL MULTI ORAL) Take 1 tablet by mouth once daily.      QUEtiapine (SEROQUEL) 50 MG tablet Take 1 tablet (50 mg total) by mouth every evening. 90 tablet 1    sodium chloride (SALINE MIST NASL) Apply to each nostril daily for dryness      sodium chloride-aloe vera (SALINE NASAL, ALOE VERA,) Gel Apply to each nostril daily for dryness      tiotropium bromide (SPIRIVA RESPIMAT) 2.5 mcg/actuation inhaler INHALE 2 PUFFS INTO THE LUNGS ONCE DAILY. CONTROLLER 4 g 2    COMIRNATY 2023-24, 12Y UP,,PF, 30 mcg/0.3 mL inection       FLUAD QUAD 2023-24,65Y UP,,PF, 60 mcg (15 mcg x 4)/0.5 mL Syrg       needle, disp, 30 gauge 30 gauge x 1/2" Ndle Use a new needle once, use new needle daily for the first week, then a new needle once weekly for 8 weeks. 30 each 0    ORTHOVISC 30 mg/2 mL       syringe with needle (SYRINGE 3CC/22GX1") 3 mL 22 gauge x 1" Syrg 1 mL by Misc.(Non-Drug; Combo Route) route every 30 days. 15 each 0    VENOFER 100 mg iron/5 mL injection          Past Surgical History:   Procedure Laterality Date    ADENOIDECTOMY      ANTERIOR CRUCIATE LIGAMENT REPAIR  2012    APPENDECTOMY      CATARACT EXTRACTION W/  INTRAOCULAR LENS IMPLANT Right 11/07/2022    Procedure: EXTRACTION, CATARACT, WITH IOL INSERTION;  Surgeon: Higinio Cristina MD;  Location: [facility];  Service: Ophthalmology;  Laterality: Right;    CATARACT EXTRACTION W/  " INTRAOCULAR LENS IMPLANT Left 2022    Procedure: EXTRACTION, CATARACT, WITH IOL INSERTION;  Surgeon: Higinio Cristina MD;  Location: [facility] OR;  Service: Ophthalmology;  Laterality: Left;    CEREBRAL ANEURYSM REPAIR      clip    DENTAL SURGERY      EYE SURGERY Bilateral     cataract removal and lens implants    HIP ARTHROPLASTY Left 2023    Procedure: TOTAL HIP ARTHROPLASTY;  Surgeon: Juan Wan MD;  Location: [address];  Service: Orthopedics;  Laterality: Left;    TONSILLECTOMY         Social History     Socioeconomic History    Marital status:    Occupational History    Occupation: Retired   Tobacco Use    Smoking status: Former     Current packs/day: 0.00     Average packs/day: 0.5 packs/day for 20.0 years (10.0 ttl pk-yrs)     Types: Cigarettes     Start date: 1979     Quit date: 1999     Years since quittin.3     Passive exposure: Past    Smokeless tobacco: Never   Substance and Sexual Activity    Alcohol use: Yes     Alcohol/week: 7.0 standard drinks of alcohol     Types: 7 Glasses of wine per week     Comment: One glass of Red wine prior to dinner    Drug use: No    Sexual activity: Yes     Partners: Male   Social History Narrative    .  Has 3 grown daughters.       Social Determinants of Health     Financial Resource Strain: Low Risk  (2023)    Overall Financial Resource Strain (CARDIA)     Difficulty of Paying Living Expenses: Not hard at all   Food Insecurity: No Food Insecurity (2023)    Hunger Vital Sign     Worried About Running Out of Food in the Last Year: Never true     Ran Out of Food in the Last Year: Never true   Transportation Needs: No Transportation Needs (2023)    PRAPARE - Transportation     Lack of Transportation (Medical): No     Lack of Transportation (Non-Medical): No   Physical Activity: Insufficiently Active (2023)    Exercise Vital Sign     Days of Exercise per Week: 1 day     Minutes of Exercise per  Session: 30 min   Stress: No Stress Concern Present (11/27/2023)    [employer] - Occupational Stress Questionnaire     Feeling of Stress : Not at all   Recent Concern: Stress - Stress Concern Present (10/28/2023)    [employer] - Occupational Stress Questionnaire     Feeling of Stress : To some extent   Housing Stability: Low Risk  (11/27/2023)    Housing Stability Vital Sign     Unable to Pay for Housing in the Last Year: No     Number of Places Lived in the Last Year: 1     Unstable Housing in the Last Year: No       OBJECTIVE:     Vital Signs Range (Last 24H):  Temp:  [36 °C (96.8 °F)-36.2 °C (97.1 °F)]   Pulse:  []   Resp:  [16-20]   BP: ()/(47-70)   SpO2:  [95 %-99 %]       Significant Labs:  Lab Results   Component Value Date    WBC 22.37 (H) 05/14/2024    HGB 5.9 (LL) 05/14/2024    HCT 22 (L) 05/14/2024     (H) 05/14/2024    CHOL 176 04/05/2022    TRIG 88 04/05/2022    HDL 69 04/05/2022    ALT 19 05/14/2024    AST 32 05/14/2024     (L) 05/14/2024    K 3.1 (L) 05/14/2024    CL 98 05/14/2024    CREATININE 1.2 05/14/2024    BUN 27 (H) 05/14/2024    CO2 21 (L) 05/14/2024    TSH 0.719 10/28/2023    INR 1.1 05/14/2024    HGBA1C 5.2 11/18/2023       Diagnostic Studies: No relevant studies.    EKG:   Results for orders placed or performed during the hospital encounter of 05/14/24   EKG 12-lead    Collection Time: 05/14/24 12:36 PM   Result Value Ref Range    QRS Duration 80 ms    [facility] QTC Calculation 461 ms    Narrative    Test Reason : K92.1,    Vent. Rate : 102 BPM     Atrial Rate : 102 BPM     P-R Int : 134 ms          QRS Dur : 080 ms      QT Int : 354 ms       P-R-T Axes : 082 041 256 degrees     QTc Int : 461 ms    Sinus tachycardia with Premature supraventricular complexes  ST and T wave abnormality, consider inferolateral ischemia  Abnormal ECG  When compared with ECG of 27-DEC-2023 10:37,  T wave inversions now present in the  inferolateral leads  Confirmed by Michelle Hyman MD ([id number]) on 5/14/2024 12:59:14 PM    Referred By: [email or web address]   SELF           Confirmed By:Michelle Hyman MD       2D ECHO:  TTE:  Results for orders placed or performed during the hospital encounter of 10/28/23   Echo   Result Value Ref Range    Ascending aorta 4.14 cm    STJ 2.98 cm    AV mean gradient 4 mmHg    Ao peak rah 1.33 m/s    Ao VTI 27.32 cm    IVS 0.79 0.6 - 1.1 cm    LA size 2.93 cm    Left Atrium Major Axis 3.88 cm    Left Atrium Minor Axis 4.39 cm    LVIDd 4.98 3.5 - 6.0 cm    LVIDs 3.18 2.1 - 4.0 cm    LVOT diameter 2.20 cm    LVOT peak VTI 17.59 cm    Posterior Wall 0.67 0.6 - 1.1 cm    MV Peak A Rah 0.79 m/s    E wave deceleration time 244.94 msec    MV Peak E Rah 0.55 m/s    RA Major Axis 4.35 cm    RA Width 4.45 cm    RVDD 3.60 cm    Sinus 3.69 cm    TAPSE 1.78 cm    TR Max Rah 2.84 m/s    LA WIDTH 4.23 cm    MV stenosis pressure 1/2 time 71.03 ms    LV Diastolic Volume 116.90 mL    LV Systolic Volume 40.37 mL    LVOT peak rah 0.83 m/s    TDI LATERAL 0.08 m/s    TDI SEPTAL 0.04 m/s    LA volume (mod) 47.60 cm3    LV LATERAL E/E' RATIO 6.88 m/s    LV SEPTAL E/E' RATIO 13.75 m/s    FS 36 %    LA volume 43.40 cm3    LV mass 120.06 g    ZLVIDD 0.96     ZLVIDS 0.99     Left Ventricle Relative Wall Thickness 0.27 cm    AV valve area 2.45 cm²    AV Velocity Ratio 0.62     AV index (prosthetic) 0.64     MV valve area p 1/2 method 3.10 cm2    E/A ratio 0.70     Mean e' 0.06 m/s    LVOT area 3.8 cm2    LVOT stroke volume 66.83 cm3    AV peak gradient 7 mmHg    E/E' ratio 9.17 m/s    LV Systolic Volume Index 25.6 mL/m2    LV Diastolic Volume Index 73.99 mL/m2    LA Volume Index 27.5 mL/m2    LV Mass Index 76 g/m2    Triscuspid Valve Regurgitation Peak Gradient 32 mmHg    LA Volume Index (Mod) 30.1 mL/m2    RUDDY by Velocity Ratio 2.37 cm²    BSA 1.57 m2    TV resting pulmonary artery pressure 40 mmHg    RV TB RVSP 11 mmHg    Est. RA pres 8 mmHg    Narrative       Left Ventricle: The left ventricle is normal in size. Normal wall   thickness. Normal wall motion. There is normal systolic function with a   visually estimated ejection fraction of 60 - 65%. There is normal   diastolic function.    Right Ventricle: Mild right ventricular enlargement. Wall thickness is   normal. Right ventricle wall motion  is normal. Systolic function is   normal.    Aortic Valve: The aortic valve is a unicuspid valve. There is mild   aortic valve sclerosis. There is trace aortic regurgitation.    Mitral Valve: There is mild regurgitation.    Pulmonary Artery: There is mild pulmonary hypertension. The estimated   pulmonary artery systolic pressure is 40 mmHg.    IVC/SVC: Intermediate venous pressure at 8 mmHg.         MALLY:  No results found. However, due to the size of the patient record, not all encounters were searched. Please check Results Review for a complete set of results.    ASSESSMENT/PLAN:           Pre-op Assessment    I have reviewed the Patient Summary Reports.     I have reviewed the Nursing Notes.    I have reviewed the Medications.     Review of Systems  Anesthesia Hx:  No problems with previous Anesthesia   History of prior surgery of interest to airway management or planning:          Denies Family Hx of Anesthesia complications.    Denies Personal Hx of Anesthesia complications.                    Social:  No Alcohol Use, Former Smoker       Hematology/Oncology:       -- Anemia:                  Denies Current/Recent Cancer                Cardiovascular:     Hypertension   CAD     Denies Dysrhythmias.    Denies CHF.    hyperlipidemia                             Pulmonary:   COPD  Denies Asthma.     Denies Sleep Apnea.                Renal/:   Denies Chronic Renal Disease.                Hepatic/GI:      Denies GERD. Denies Liver Disease.            Musculoskeletal:  Denies Arthritis.               Neurological:    Denies CVA. Denies Neuromuscular Disease.   Denies Seizures.           Denies Chronic Pain Syndrome                         Endocrine:  Denies Diabetes.   Denies Hyperthyroidism.       Denies Obesity / BMI > 30  Psych:   denies anxiety denies depression                Physical Exam  General: Well nourished, Cooperative, Alert and Oriented    Airway:  Mallampati: III   Mouth Opening: Normal  TM Distance: Normal  Tongue: Normal  Neck ROM: Normal ROM    Dental:  Intact        Anesthesia Plan  Type of Anesthesia, risks & benefits discussed:    Anesthesia Type: Gen ETT  Intra-op Monitoring Plan: Standard ASA Monitors  Post Op Pain Control Plan: multimodal analgesia and IV/PO Opioids PRN  Induction:  IV  Airway Plan: Direct and Video, Post-Induction  Informed Consent: Informed consent signed with the Patient and all parties understand the risks and agree with anesthesia plan.  All questions answered.   ASA Score: 3 Emergent  Day of Surgery Review of History & Physical: H&P Update referred to the surgeon/provider.    Ready For Surgery From Anesthesia Perspective.     .

## 2024-05-14 NOTE — TELEPHONE ENCOUNTER
Spoke with Mr. Floyd - Mrs. Floyd is being admitted to the hospital. He has questions as to why oxygen levels read high, bit she still has a hard time breathing Asking Dr. Roberts to call him.

## 2024-05-14 NOTE — ANESTHESIA PROCEDURE NOTES
Intubation    Date/Time: 5/14/2024 5:58 PM    Performed by: Snehal Lyle CRNA  Authorized by: Massiel Lozano MD    Intubation:     Induction:  Rapid sequence induction    Intubated:  Postinduction    Mask Ventilation:  Not attempted    Attempts:  1    Attempted By:  CRNA    Method of Intubation:  Video laryngoscopy    Blade:  Ibrahim 3    Laryngeal View Grade: Grade I - full view of cords      Difficult Airway Encountered?: No      Complications:  None    Airway Device:  Oral endotracheal tube    Airway Device Size:  7.5    Style/Cuff Inflation:  Cuffed (inflated to minimal occlusive pressure)    Tube secured:  24    Secured at:  The lips    Placement Verified By:  Capnometry    Complicating Factors:  None    Findings Post-Intubation:  BS equal bilateral and atraumatic/condition of teeth unchanged

## 2024-05-14 NOTE — HPI
"Mrs. Floyd is a 73 year old female with PMH notable for COPD home O2 PRN, pulm HTN, R carotid stent on ASA, HFpEF, anemia, and ERIK on infusions who presented to [facility] ED with the melena.  is at bedside and reports that the patient had an episode of diarrhea and lethargy yesterday. She reports that it felt as if "she was going to pass out", so she was using her walker to get around. Patient's  reports that this has never happened before. Patient denies excessive OTC NSAID use. She reports that she drinks 2 wine glasses a day. This morning when the patient woke up she felt very lethargic. Patient went to use the bathroom, and  noted bright red stool in the bowl. Patient reported a pre syncopal episode and felt lightheaded. She admits to weakness, SOB, light-headedness, hematochezia, and pre syncope. Patient denies nausea, vomiting, hematemesis, falls, confusion, chest pain, urinary changes, and fevers. Patient reports taking aspirin daily but no DOAC.     Hemoglobin 5.9 at presentation to ED (baseline ~10). GI and IR consulted given concern for active bleed seen on CTA. In the emergency department she was given 1L IVF and a 80 mg IV protonix bolus. Critical care medicine. consulted for GIB. CTA in the Ed revealed "Acute gastrointestinal bleed at the level of the cecum". IR planning for embolization today. Patient is also receiving 2 pRBC due to acute anemia.   "

## 2024-05-14 NOTE — TELEPHONE ENCOUNTER
Pt had diarrhea last night with a near syncopal episode. She does have oxygen at home as needed and has been using since last night. Pt has continued with diarrhea this morning and it looks bloody and tarry and pt's daughter describes as a large blood clot and large amount of bleeding.  Pt also has increased weakness. Pt did have an iron infusion on Friday. Care advice is be seen in ED now. Pt's daughter asked about going to MD office instead, stressed care advice of getting seen now in ED. Instructed to call back with additional questions. Pt and her daughter verbalized understanding.   Reason for Disposition   SEVERE rectal bleeding (large blood clots; constant or on and off bleeding)    Additional Information   Negative: Passed out (i.e., fainted, collapsed and was not responding)   Negative: Shock suspected (e.g., cold/pale/clammy skin, too weak to stand, low BP, rapid pulse)   Negative: Vomiting red blood or black (coffee ground) material   Negative: Sounds like a life-threatening emergency to the triager    Protocols used: Rectal Bleeding-A-OH

## 2024-05-14 NOTE — TELEPHONE ENCOUNTER
Following up with patient and spouse, informed them per dr singh if concern for GI bleeding w/ either melena or hematochezia, go to ER at [facility] for further eval. Patient's spouse verbalized understanding.

## 2024-05-14 NOTE — ASSESSMENT & PLAN NOTE
Noted in history but last echo reveals normal function. Not on home medications.    Transthoracic echo (TTE) complete 10/29/2023    Interpretation Summary    Left Ventricle: The left ventricle is normal in size. Normal wall thickness. Normal wall motion. There is normal systolic function with a visually estimated ejection fraction of 60 - 65%. There is normal diastolic function.    Right Ventricle: Mild right ventricular enlargement. Wall thickness is normal. Right ventricle wall motion  is normal. Systolic function is normal.    Aortic Valve: The aortic valve is a unicuspid valve. There is mild aortic valve sclerosis. There is trace aortic regurgitation.    Mitral Valve: There is mild regurgitation.    Pulmonary Artery: There is mild pulmonary hypertension. The estimated pulmonary artery systolic pressure is 40 mmHg.    IVC/SVC: Intermediate venous pressure at 8 mmHg.

## 2024-05-14 NOTE — ASSESSMENT & PLAN NOTE
73 year old female with multiple medical problems presenting with melena and some bright red blood with associated pre-syncope found to have hgb of 5.9 (from baseline ~10). CTA with acute gastrointestinal bleed at the level of the cecum.    -- GI consulted. Appreciate assistance   -- IR consulted, taking for coiling procedure today 5/14  -- NPO   -- IR consulted   -- 2 u PRBC given in ED   -- Protonix 80 mg IV x 1 and 40 mg BID   -- Hold all A/C and A/P agents   -- Correct any coagulopathy with platelets and FFP for goal of platelets > 50K and INR <2.0   -- Maintain large bore IV access   -- MAP > 65 goal   -- Maintain type and screen   -- Trend CBCs

## 2024-05-15 ENCOUNTER — ANESTHESIA EVENT (OUTPATIENT)
Dept: ENDOSCOPY | Facility: HOSPITAL | Age: 74
DRG: 329 | End: 2024-05-15
Payer: MEDICARE

## 2024-05-15 ENCOUNTER — ANESTHESIA (OUTPATIENT)
Dept: ENDOSCOPY | Facility: HOSPITAL | Age: 74
DRG: 329 | End: 2024-05-15
Payer: MEDICARE

## 2024-05-15 VITALS — RESPIRATION RATE: 24 BRPM | OXYGEN SATURATION: 82 % | HEART RATE: 105 BPM

## 2024-05-15 LAB
ABO + RH BLD: NORMAL
ALBUMIN SERPL BCP-MCNC: 2.1 G/DL (ref 3.5–5.2)
ALLENS TEST: ABNORMAL
ALP SERPL-CCNC: 55 U/L (ref 55–135)
ALT SERPL W/O P-5'-P-CCNC: 15 U/L (ref 10–44)
ANION GAP SERPL CALC-SCNC: 10 MMOL/L (ref 8–16)
ANISOCYTOSIS BLD QL SMEAR: ABNORMAL
ANISOCYTOSIS BLD QL SMEAR: SLIGHT
AST SERPL-CCNC: 26 U/L (ref 10–40)
BASO STIPL BLD QL SMEAR: ABNORMAL
BASO STIPL BLD QL SMEAR: ABNORMAL
BASOPHILS # BLD AUTO: 0.03 K/UL (ref 0–0.2)
BASOPHILS # BLD AUTO: 0.04 K/UL (ref 0–0.2)
BASOPHILS # BLD AUTO: 0.05 K/UL (ref 0–0.2)
BASOPHILS # BLD AUTO: ABNORMAL K/UL (ref 0–0.2)
BASOPHILS NFR BLD: 0 % (ref 0–1.9)
BASOPHILS NFR BLD: 0.1 % (ref 0–1.9)
BILIRUB SERPL-MCNC: 1 MG/DL (ref 0.1–1)
BILIRUB UR QL STRIP: NEGATIVE
BLD GP AB SCN CELLS X3 SERPL QL: NORMAL
BLD PROD TYP BPU: NORMAL
BLOOD UNIT EXPIRATION DATE: NORMAL
BLOOD UNIT TYPE CODE: 6200
BLOOD UNIT TYPE: NORMAL
BUN SERPL-MCNC: 21 MG/DL (ref 8–23)
BURR CELLS BLD QL SMEAR: ABNORMAL
CALCIUM SERPL-MCNC: 7.7 MG/DL (ref 8.7–10.5)
CHLORIDE SERPL-SCNC: 109 MMOL/L (ref 95–110)
CLARITY UR REFRACT.AUTO: CLEAR
CO2 SERPL-SCNC: 19 MMOL/L (ref 23–29)
CODING SYSTEM: NORMAL
COLOR UR AUTO: YELLOW
CREAT SERPL-MCNC: 0.8 MG/DL (ref 0.5–1.4)
CROSSMATCH INTERPRETATION: NORMAL
DACRYOCYTES BLD QL SMEAR: ABNORMAL
DACRYOCYTES BLD QL SMEAR: ABNORMAL
DELSYS: ABNORMAL
DIFFERENTIAL METHOD BLD: ABNORMAL
DISPENSE STATUS: NORMAL
DOHLE BOD BLD QL SMEAR: PRESENT
DOHLE BOD BLD QL SMEAR: PRESENT
EOSINOPHIL # BLD AUTO: 0 K/UL (ref 0–0.5)
EOSINOPHIL # BLD AUTO: ABNORMAL K/UL (ref 0–0.5)
EOSINOPHIL NFR BLD: 0 % (ref 0–8)
EOSINOPHIL NFR BLD: 0.1 % (ref 0–8)
EOSINOPHIL NFR BLD: 0.1 % (ref 0–8)
ERYTHROCYTE [DISTWIDTH] IN BLOOD BY AUTOMATED COUNT: 16.2 % (ref 11.5–14.5)
ERYTHROCYTE [DISTWIDTH] IN BLOOD BY AUTOMATED COUNT: 16.7 % (ref 11.5–14.5)
ERYTHROCYTE [DISTWIDTH] IN BLOOD BY AUTOMATED COUNT: 17 % (ref 11.5–14.5)
ERYTHROCYTE [DISTWIDTH] IN BLOOD BY AUTOMATED COUNT: 17.2 % (ref 11.5–14.5)
ERYTHROCYTE [DISTWIDTH] IN BLOOD BY AUTOMATED COUNT: 17.2 % (ref 11.5–14.5)
ERYTHROCYTE [DISTWIDTH] IN BLOOD BY AUTOMATED COUNT: 17.4 % (ref 11.5–14.5)
ERYTHROCYTE [SEDIMENTATION RATE] IN BLOOD BY WESTERGREN METHOD: 5 MM/H
EST. GFR  (NO RACE VARIABLE): >60 ML/MIN/1.73 M^2
FIO2: 40
GLUCOSE SERPL-MCNC: 120 MG/DL (ref 70–110)
GLUCOSE SERPL-MCNC: 120 MG/DL (ref 70–110)
GLUCOSE SERPL-MCNC: 131 MG/DL (ref 70–110)
GLUCOSE UR QL STRIP: NEGATIVE
HCO3 UR-SCNC: 22.2 MMOL/L (ref 24–28)
HCO3 UR-SCNC: 22.5 MMOL/L (ref 24–28)
HCO3 UR-SCNC: 26.2 MMOL/L (ref 24–28)
HCT VFR BLD AUTO: 18.9 % (ref 37–48.5)
HCT VFR BLD AUTO: 20.7 % (ref 37–48.5)
HCT VFR BLD AUTO: 21 % (ref 37–48.5)
HCT VFR BLD AUTO: 21.3 % (ref 37–48.5)
HCT VFR BLD AUTO: 22.4 % (ref 37–48.5)
HCT VFR BLD AUTO: 24.2 % (ref 37–48.5)
HCT VFR BLD CALC: 16 %PCV (ref 36–54)
HCT VFR BLD CALC: 21 %PCV (ref 36–54)
HCT VFR BLD CALC: 23 %PCV (ref 36–54)
HGB BLD-MCNC: 6.4 G/DL (ref 12–16)
HGB BLD-MCNC: 6.6 G/DL (ref 12–16)
HGB BLD-MCNC: 7 G/DL (ref 12–16)
HGB BLD-MCNC: 7.1 G/DL (ref 12–16)
HGB BLD-MCNC: 7.5 G/DL (ref 12–16)
HGB BLD-MCNC: 7.9 G/DL (ref 12–16)
HGB UR QL STRIP: NEGATIVE
HYPOCHROMIA BLD QL SMEAR: ABNORMAL
IMM GRANULOCYTES # BLD AUTO: 1.01 K/UL (ref 0–0.04)
IMM GRANULOCYTES # BLD AUTO: 1.08 K/UL (ref 0–0.04)
IMM GRANULOCYTES # BLD AUTO: 1.42 K/UL (ref 0–0.04)
IMM GRANULOCYTES # BLD AUTO: 1.7 K/UL (ref 0–0.04)
IMM GRANULOCYTES # BLD AUTO: 1.81 K/UL (ref 0–0.04)
IMM GRANULOCYTES # BLD AUTO: ABNORMAL K/UL (ref 0–0.04)
IMM GRANULOCYTES NFR BLD AUTO: 3 % (ref 0–0.5)
IMM GRANULOCYTES NFR BLD AUTO: 3.3 % (ref 0–0.5)
IMM GRANULOCYTES NFR BLD AUTO: 3.6 % (ref 0–0.5)
IMM GRANULOCYTES NFR BLD AUTO: 3.6 % (ref 0–0.5)
IMM GRANULOCYTES NFR BLD AUTO: 3.9 % (ref 0–0.5)
IMM GRANULOCYTES NFR BLD AUTO: ABNORMAL % (ref 0–0.5)
INR PPP: 1 (ref 0.8–1.2)
KETONES UR QL STRIP: NEGATIVE
LACTATE SERPL-SCNC: 1.2 MMOL/L (ref 0.5–2.2)
LEUKOCYTE ESTERASE UR QL STRIP: NEGATIVE
LYMPHOCYTES # BLD AUTO: 1.2 K/UL (ref 1–4.8)
LYMPHOCYTES # BLD AUTO: 1.6 K/UL (ref 1–4.8)
LYMPHOCYTES # BLD AUTO: 1.6 K/UL (ref 1–4.8)
LYMPHOCYTES # BLD AUTO: 1.7 K/UL (ref 1–4.8)
LYMPHOCYTES # BLD AUTO: 1.7 K/UL (ref 1–4.8)
LYMPHOCYTES # BLD AUTO: ABNORMAL K/UL (ref 1–4.8)
LYMPHOCYTES NFR BLD: 3 % (ref 18–48)
LYMPHOCYTES NFR BLD: 3.6 % (ref 18–48)
LYMPHOCYTES NFR BLD: 3.6 % (ref 18–48)
LYMPHOCYTES NFR BLD: 4.7 % (ref 18–48)
LYMPHOCYTES NFR BLD: 5 % (ref 18–48)
LYMPHOCYTES NFR BLD: 5 % (ref 18–48)
MAGNESIUM SERPL-MCNC: 1.6 MG/DL (ref 1.6–2.6)
MCH RBC QN AUTO: 29.4 PG (ref 27–31)
MCH RBC QN AUTO: 29.8 PG (ref 27–31)
MCH RBC QN AUTO: 29.8 PG (ref 27–31)
MCH RBC QN AUTO: 30 PG (ref 27–31)
MCH RBC QN AUTO: 30.5 PG (ref 27–31)
MCH RBC QN AUTO: 30.8 PG (ref 27–31)
MCHC RBC AUTO-ENTMCNC: 31.9 G/DL (ref 32–36)
MCHC RBC AUTO-ENTMCNC: 32.6 G/DL (ref 32–36)
MCHC RBC AUTO-ENTMCNC: 33.3 G/DL (ref 32–36)
MCHC RBC AUTO-ENTMCNC: 33.3 G/DL (ref 32–36)
MCHC RBC AUTO-ENTMCNC: 33.5 G/DL (ref 32–36)
MCHC RBC AUTO-ENTMCNC: 33.9 G/DL (ref 32–36)
MCV RBC AUTO: 89 FL (ref 82–98)
MCV RBC AUTO: 90 FL (ref 82–98)
MCV RBC AUTO: 97 FL (ref 82–98)
MODE: ABNORMAL
MONOCYTES # BLD AUTO: 5.6 K/UL (ref 0.3–1)
MONOCYTES # BLD AUTO: 5.7 K/UL (ref 0.3–1)
MONOCYTES # BLD AUTO: 6.2 K/UL (ref 0.3–1)
MONOCYTES # BLD AUTO: 7.4 K/UL (ref 0.3–1)
MONOCYTES # BLD AUTO: 7.7 K/UL (ref 0.3–1)
MONOCYTES # BLD AUTO: ABNORMAL K/UL (ref 0.3–1)
MONOCYTES NFR BLD: 10 % (ref 4–15)
MONOCYTES NFR BLD: 14.1 % (ref 4–15)
MONOCYTES NFR BLD: 15.6 % (ref 4–15)
MONOCYTES NFR BLD: 16.4 % (ref 4–15)
MONOCYTES NFR BLD: 17.2 % (ref 4–15)
MONOCYTES NFR BLD: 18.2 % (ref 4–15)
MYELOCYTES NFR BLD MANUAL: 1 %
NEUTROPHILS # BLD AUTO: 24.4 K/UL (ref 1.8–7.7)
NEUTROPHILS # BLD AUTO: 24.9 K/UL (ref 1.8–7.7)
NEUTROPHILS # BLD AUTO: 31.4 K/UL (ref 1.8–7.7)
NEUTROPHILS # BLD AUTO: 35.6 K/UL (ref 1.8–7.7)
NEUTROPHILS # BLD AUTO: 36.4 K/UL (ref 1.8–7.7)
NEUTROPHILS NFR BLD: 73.9 % (ref 38–73)
NEUTROPHILS NFR BLD: 74.3 % (ref 38–73)
NEUTROPHILS NFR BLD: 76 % (ref 38–73)
NEUTROPHILS NFR BLD: 77.1 % (ref 38–73)
NEUTROPHILS NFR BLD: 79.2 % (ref 38–73)
NEUTROPHILS NFR BLD: 82 % (ref 38–73)
NEUTS BAND NFR BLD MANUAL: 1 %
NITRITE UR QL STRIP: NEGATIVE
NRBC BLD-RTO: 0 /100 WBC
NRBC BLD-RTO: 1 /100 WBC
NUM UNITS TRANS PACKED RBC: NORMAL
OVALOCYTES BLD QL SMEAR: ABNORMAL
PCO2 BLDA: 47.6 MMHG (ref 35–45)
PCO2 BLDA: 47.9 MMHG (ref 35–45)
PCO2 BLDA: 49.2 MMHG (ref 35–45)
PH SMN: 7.27 [PH] (ref 7.35–7.45)
PH SMN: 7.28 [PH] (ref 7.35–7.45)
PH SMN: 7.35 [PH] (ref 7.35–7.45)
PH UR STRIP: 6 [PH] (ref 5–8)
PHOSPHATE SERPL-MCNC: 3.8 MG/DL (ref 2.7–4.5)
PLATELET # BLD AUTO: 228 K/UL (ref 150–450)
PLATELET # BLD AUTO: 236 K/UL (ref 150–450)
PLATELET # BLD AUTO: 278 K/UL (ref 150–450)
PLATELET # BLD AUTO: 293 K/UL (ref 150–450)
PLATELET # BLD AUTO: 293 K/UL (ref 150–450)
PLATELET # BLD AUTO: 298 K/UL (ref 150–450)
PLATELET BLD QL SMEAR: ABNORMAL
PMV BLD AUTO: 10 FL (ref 9.2–12.9)
PMV BLD AUTO: 10 FL (ref 9.2–12.9)
PMV BLD AUTO: 9.7 FL (ref 9.2–12.9)
PMV BLD AUTO: 9.8 FL (ref 9.2–12.9)
PMV BLD AUTO: 9.9 FL (ref 9.2–12.9)
PMV BLD AUTO: 9.9 FL (ref 9.2–12.9)
PO2 BLDA: 18 MMHG (ref 40–60)
PO2 BLDA: 193 MMHG (ref 80–100)
PO2 BLDA: 82 MMHG (ref 80–100)
POC BE: -4 MMOL/L
POC BE: -5 MMOL/L
POC BE: 1 MMOL/L
POC IONIZED CALCIUM: 1.09 MMOL/L (ref 1.06–1.42)
POC IONIZED CALCIUM: 1.18 MMOL/L (ref 1.06–1.42)
POC IONIZED CALCIUM: 1.35 MMOL/L (ref 1.06–1.42)
POC SATURATED O2: 100 % (ref 95–100)
POC SATURATED O2: 23 % (ref 95–100)
POC SATURATED O2: 94 % (ref 95–100)
POC TCO2: 24 MMOL/L (ref 23–27)
POC TCO2: 24 MMOL/L (ref 23–27)
POC TCO2: 28 MMOL/L (ref 24–29)
POIKILOCYTOSIS BLD QL SMEAR: SLIGHT
POLYCHROMASIA BLD QL SMEAR: ABNORMAL
POTASSIUM BLD-SCNC: 2.8 MMOL/L (ref 3.5–5.1)
POTASSIUM BLD-SCNC: 3 MMOL/L (ref 3.5–5.1)
POTASSIUM BLD-SCNC: 3.3 MMOL/L (ref 3.5–5.1)
POTASSIUM SERPL-SCNC: 3 MMOL/L (ref 3.5–5.1)
PROMYELOCYTES NFR BLD MANUAL: 1 %
PROT SERPL-MCNC: 4.6 G/DL (ref 6–8.4)
PROT UR QL STRIP: NEGATIVE
PROTHROMBIN TIME: 11.2 SEC (ref 9–12.5)
RBC # BLD AUTO: 2.1 M/UL (ref 4–5.4)
RBC # BLD AUTO: 2.14 M/UL (ref 4–5.4)
RBC # BLD AUTO: 2.35 M/UL (ref 4–5.4)
RBC # BLD AUTO: 2.38 M/UL (ref 4–5.4)
RBC # BLD AUTO: 2.5 M/UL (ref 4–5.4)
RBC # BLD AUTO: 2.69 M/UL (ref 4–5.4)
SAMPLE: ABNORMAL
SCHISTOCYTES BLD QL SMEAR: ABNORMAL
SCHISTOCYTES BLD QL SMEAR: PRESENT
SITE: ABNORMAL
SMUDGE CELLS BLD QL SMEAR: PRESENT
SODIUM BLD-SCNC: 137 MMOL/L (ref 136–145)
SODIUM BLD-SCNC: 138 MMOL/L (ref 136–145)
SODIUM BLD-SCNC: 142 MMOL/L (ref 136–145)
SODIUM SERPL-SCNC: 138 MMOL/L (ref 136–145)
SP GR UR STRIP: >=1.03 (ref 1–1.03)
SPECIMEN OUTDATE: NORMAL
SPHEROCYTES BLD QL SMEAR: ABNORMAL
TARGETS BLD QL SMEAR: ABNORMAL
TARGETS BLD QL SMEAR: ABNORMAL
TOXIC GRANULES BLD QL SMEAR: PRESENT
URN SPEC COLLECT METH UR: ABNORMAL
WBC # BLD AUTO: 32.81 K/UL (ref 3.9–12.7)
WBC # BLD AUTO: 33.74 K/UL (ref 3.9–12.7)
WBC # BLD AUTO: 34.02 K/UL (ref 3.9–12.7)
WBC # BLD AUTO: 39.72 K/UL (ref 3.9–12.7)
WBC # BLD AUTO: 46.87 K/UL (ref 3.9–12.7)
WBC # BLD AUTO: 47.26 K/UL (ref 3.9–12.7)

## 2024-05-15 PROCEDURE — P9016 RBC LEUKOCYTES REDUCED: HCPCS | Mod: NTX

## 2024-05-15 PROCEDURE — C9113 INJ PANTOPRAZOLE SODIUM, VIA: HCPCS | Mod: NTX

## 2024-05-15 PROCEDURE — 85027 COMPLETE CBC AUTOMATED: CPT | Mod: NTX

## 2024-05-15 PROCEDURE — 20000000 HC ICU ROOM: Mod: NTX

## 2024-05-15 PROCEDURE — 37000009 HC ANESTHESIA EA ADD 15 MINS: Mod: NTX | Performed by: INTERNAL MEDICINE

## 2024-05-15 PROCEDURE — 86920 COMPATIBILITY TEST SPIN: CPT | Mod: NTX | Performed by: STUDENT IN AN ORGANIZED HEALTH CARE EDUCATION/TRAINING PROGRAM

## 2024-05-15 PROCEDURE — 84100 ASSAY OF PHOSPHORUS: CPT | Mod: NTX

## 2024-05-15 PROCEDURE — D9220A PRA ANESTHESIA: Mod: CRNA,NTX,, | Performed by: NURSE ANESTHETIST, CERTIFIED REGISTERED

## 2024-05-15 PROCEDURE — 27000221 HC OXYGEN, UP TO 24 HOURS: Mod: NTX

## 2024-05-15 PROCEDURE — 43235 EGD DIAGNOSTIC BRUSH WASH: CPT | Mod: GC,NTX,, | Performed by: INTERNAL MEDICINE

## 2024-05-15 PROCEDURE — 86920 COMPATIBILITY TEST SPIN: CPT | Mod: NTX

## 2024-05-15 PROCEDURE — 25000003 PHARM REV CODE 250: Mod: NTX

## 2024-05-15 PROCEDURE — 85025 COMPLETE CBC W/AUTO DIFF WBC: CPT | Mod: 91,NTX | Performed by: INTERNAL MEDICINE

## 2024-05-15 PROCEDURE — 83735 ASSAY OF MAGNESIUM: CPT | Mod: NTX

## 2024-05-15 PROCEDURE — 25000242 PHARM REV CODE 250 ALT 637 W/ HCPCS: Mod: NTX

## 2024-05-15 PROCEDURE — 51702 INSERT TEMP BLADDER CATH: CPT | Mod: NTX

## 2024-05-15 PROCEDURE — 81003 URINALYSIS AUTO W/O SCOPE: CPT | Mod: NTX

## 2024-05-15 PROCEDURE — D9220A PRA ANESTHESIA: Mod: ANES,NTX,, | Performed by: STUDENT IN AN ORGANIZED HEALTH CARE EDUCATION/TRAINING PROGRAM

## 2024-05-15 PROCEDURE — 63600175 PHARM REV CODE 636 W HCPCS: Mod: NTX

## 2024-05-15 PROCEDURE — 85007 BL SMEAR W/DIFF WBC COUNT: CPT | Mod: NTX

## 2024-05-15 PROCEDURE — 85610 PROTHROMBIN TIME: CPT | Mod: NTX

## 2024-05-15 PROCEDURE — 99900035 HC TECH TIME PER 15 MIN (STAT): Mod: NTX

## 2024-05-15 PROCEDURE — 94640 AIRWAY INHALATION TREATMENT: CPT | Mod: NTX

## 2024-05-15 PROCEDURE — P9016 RBC LEUKOCYTES REDUCED: HCPCS | Mod: NTX | Performed by: STUDENT IN AN ORGANIZED HEALTH CARE EDUCATION/TRAINING PROGRAM

## 2024-05-15 PROCEDURE — 85025 COMPLETE CBC W/AUTO DIFF WBC: CPT | Mod: 91,NTX

## 2024-05-15 PROCEDURE — 86901 BLOOD TYPING SEROLOGIC RH(D): CPT | Mod: NTX

## 2024-05-15 PROCEDURE — 25000003 PHARM REV CODE 250: Mod: NTX | Performed by: STUDENT IN AN ORGANIZED HEALTH CARE EDUCATION/TRAINING PROGRAM

## 2024-05-15 PROCEDURE — 25500020 PHARM REV CODE 255: Mod: NTX | Performed by: INTERNAL MEDICINE

## 2024-05-15 PROCEDURE — 37000008 HC ANESTHESIA 1ST 15 MINUTES: Mod: NTX | Performed by: INTERNAL MEDICINE

## 2024-05-15 PROCEDURE — 80053 COMPREHEN METABOLIC PANEL: CPT | Mod: NTX

## 2024-05-15 PROCEDURE — 43235 EGD DIAGNOSTIC BRUSH WASH: CPT | Mod: NTX | Performed by: INTERNAL MEDICINE

## 2024-05-15 PROCEDURE — 63600175 PHARM REV CODE 636 W HCPCS: Mod: NTX | Performed by: STUDENT IN AN ORGANIZED HEALTH CARE EDUCATION/TRAINING PROGRAM

## 2024-05-15 PROCEDURE — 83605 ASSAY OF LACTIC ACID: CPT | Mod: NTX

## 2024-05-15 PROCEDURE — 99223 1ST HOSP IP/OBS HIGH 75: CPT | Mod: GC,NTX,, | Performed by: INTERNAL MEDICINE

## 2024-05-15 PROCEDURE — 0DJ08ZZ INSPECTION OF UPPER INTESTINAL TRACT, VIA NATURAL OR ARTIFICIAL OPENING ENDOSCOPIC: ICD-10-PCS | Performed by: INTERNAL MEDICINE

## 2024-05-15 PROCEDURE — 94761 N-INVAS EAR/PLS OXIMETRY MLT: CPT | Mod: NTX

## 2024-05-15 PROCEDURE — 36430 TRANSFUSION BLD/BLD COMPNT: CPT | Mod: NTX

## 2024-05-15 PROCEDURE — 85014 HEMATOCRIT: CPT | Mod: NTX

## 2024-05-15 RX ORDER — QUETIAPINE FUMARATE 25 MG/1
50 TABLET, FILM COATED ORAL NIGHTLY
Status: DISCONTINUED | OUTPATIENT
Start: 2024-05-16 | End: 2024-05-15

## 2024-05-15 RX ORDER — MAGNESIUM SULFATE HEPTAHYDRATE 40 MG/ML
2 INJECTION, SOLUTION INTRAVENOUS ONCE
Status: COMPLETED | OUTPATIENT
Start: 2024-05-15 | End: 2024-05-15

## 2024-05-15 RX ORDER — DICYCLOMINE HYDROCHLORIDE 10 MG/1
10 CAPSULE ORAL 4 TIMES DAILY
Status: DISCONTINUED | OUTPATIENT
Start: 2024-05-15 | End: 2024-05-16

## 2024-05-15 RX ORDER — HYDROCODONE BITARTRATE AND ACETAMINOPHEN 500; 5 MG/1; MG/1
TABLET ORAL
Status: DISCONTINUED | OUTPATIENT
Start: 2024-05-15 | End: 2024-05-16

## 2024-05-15 RX ORDER — LORAZEPAM 2 MG/ML
1 INJECTION INTRAMUSCULAR ONCE
Status: COMPLETED | OUTPATIENT
Start: 2024-05-15 | End: 2024-05-15

## 2024-05-15 RX ORDER — TALC
6 POWDER (GRAM) TOPICAL NIGHTLY PRN
Status: DISCONTINUED | OUTPATIENT
Start: 2024-05-15 | End: 2024-05-17

## 2024-05-15 RX ORDER — PROPOFOL 10 MG/ML
VIAL (ML) INTRAVENOUS
Status: DISCONTINUED | OUTPATIENT
Start: 2024-05-15 | End: 2024-05-15

## 2024-05-15 RX ORDER — POLYETHYLENE GLYCOL 3350, SODIUM SULFATE ANHYDROUS, SODIUM BICARBONATE, SODIUM CHLORIDE, POTASSIUM CHLORIDE 236; 22.74; 6.74; 5.86; 2.97 G/4L; G/4L; G/4L; G/4L; G/4L
4000 POWDER, FOR SOLUTION ORAL ONCE
Status: COMPLETED | OUTPATIENT
Start: 2024-05-15 | End: 2024-05-15

## 2024-05-15 RX ORDER — LIDOCAINE HYDROCHLORIDE 10 MG/ML
INJECTION, SOLUTION INTRAVENOUS
Status: DISCONTINUED | OUTPATIENT
Start: 2024-05-15 | End: 2024-05-15

## 2024-05-15 RX ORDER — QUETIAPINE FUMARATE 25 MG/1
50 TABLET, FILM COATED ORAL NIGHTLY
Status: DISCONTINUED | OUTPATIENT
Start: 2024-05-15 | End: 2024-05-17

## 2024-05-15 RX ORDER — SODIUM CHLORIDE 0.9 % (FLUSH) 0.9 %
10 SYRINGE (ML) INJECTION
Status: DISCONTINUED | OUTPATIENT
Start: 2024-05-15 | End: 2024-06-03 | Stop reason: HOSPADM

## 2024-05-15 RX ADMIN — Medication 6 MG: at 09:05

## 2024-05-15 RX ADMIN — IOHEXOL 75 ML: 350 INJECTION, SOLUTION INTRAVENOUS at 07:05

## 2024-05-15 RX ADMIN — DICYCLOMINE HYDROCHLORIDE 10 MG: 10 CAPSULE ORAL at 09:05

## 2024-05-15 RX ADMIN — PROPOFOL 20 MG: 10 INJECTION, EMULSION INTRAVENOUS at 04:05

## 2024-05-15 RX ADMIN — ACETAMINOPHEN 650 MG: 325 TABLET ORAL at 07:05

## 2024-05-15 RX ADMIN — POLYETHYLENE GLYCOL 3350, SODIUM SULFATE ANHYDROUS, SODIUM BICARBONATE, SODIUM CHLORIDE, POTASSIUM CHLORIDE 4000 ML: 236; 22.74; 6.74; 5.86; 2.97 POWDER, FOR SOLUTION ORAL at 05:05

## 2024-05-15 RX ADMIN — POTASSIUM BICARBONATE 40 MEQ: 391 TABLET, EFFERVESCENT ORAL at 07:05

## 2024-05-15 RX ADMIN — LIDOCAINE HYDROCHLORIDE 30 MG: 10 INJECTION, SOLUTION INTRAVENOUS at 04:05

## 2024-05-15 RX ADMIN — QUETIAPINE FUMARATE 50 MG: 25 TABLET ORAL at 09:05

## 2024-05-15 RX ADMIN — FLUTICASONE FUROATE AND VILANTEROL TRIFENATATE 1 PUFF: 100; 25 POWDER RESPIRATORY (INHALATION) at 08:05

## 2024-05-15 RX ADMIN — MAGNESIUM SULFATE HEPTAHYDRATE 2 G: 40 INJECTION, SOLUTION INTRAVENOUS at 06:05

## 2024-05-15 RX ADMIN — ALLOPURINOL 200 MG: 100 TABLET ORAL at 09:05

## 2024-05-15 RX ADMIN — POTASSIUM BICARBONATE 40 MEQ: 391 TABLET, EFFERVESCENT ORAL at 09:05

## 2024-05-15 RX ADMIN — LORAZEPAM 1 MG: 2 INJECTION INTRAMUSCULAR; INTRAVENOUS at 11:05

## 2024-05-15 RX ADMIN — PANTOPRAZOLE SODIUM 40 MG: 40 INJECTION, POWDER, FOR SOLUTION INTRAVENOUS at 09:05

## 2024-05-15 NOTE — SUBJECTIVE & OBJECTIVE
Interval History/Significant Events: Patient had coiling procedure overnight and tolerated procedure well. GI saw patient today and plans for colonoscopy tomorrow.    Review of Systems   Constitutional:  Negative for chills and fever.   HENT:  Positive for nosebleeds. Negative for rhinorrhea, sore throat and trouble swallowing.    Eyes:  Negative for visual disturbance.   Respiratory:  Positive for shortness of breath. Negative for cough and wheezing.    Cardiovascular:  Negative for chest pain and leg swelling.   Gastrointestinal:  Positive for blood in stool and diarrhea. Negative for abdominal distention, abdominal pain, nausea and vomiting.   Endocrine: Positive for cold intolerance.   Genitourinary:  Negative for dysuria, hematuria and pelvic pain.   Musculoskeletal:  Negative for back pain.   Skin:  Positive for pallor.   Neurological:  Positive for syncope, weakness and light-headedness.   Psychiatric/Behavioral:  Negative for confusion and decreased concentration.      Objective:     Vital Signs (Most Recent):  Temp: 98.8 °F (37.1 °C) (05/15/24 0700)  Pulse: 93 (05/15/24 0900)  Resp: 16 (05/15/24 0900)  BP: (!) 90/51 (05/15/24 0900)  SpO2: 98 % (05/15/24 0900) Vital Signs (24h Range):  Temp:  [96.8 °F (36 °C)-98.8 °F (37.1 °C)] 98.8 °F (37.1 °C)  Pulse:  [] 93  Resp:  [14-42] 16  SpO2:  [95 %-100 %] 98 %  BP: ()/(47-83) 90/51   Weight: 52.6 kg (115 lb 15.4 oz)  Body mass index is 20.54 kg/m².      Intake/Output Summary (Last 24 hours) at 5/15/2024 0949  Last data filed at 5/15/2024 0600  Gross per 24 hour   Intake 3217.96 ml   Output 300 ml   Net 2917.96 ml          Physical Exam  Vitals and nursing note reviewed.   Constitutional:       General: She is not in acute distress.     Comments: Patient irritated today and states she has not been able to sleep.   HENT:      Head: Normocephalic and atraumatic.      Mouth/Throat:      Mouth: Mucous membranes are dry.      Pharynx: Oropharynx is clear.    Eyes:      General: No scleral icterus.     Extraocular Movements: Extraocular movements intact.      Conjunctiva/sclera: Conjunctivae normal.   Cardiovascular:      Rate and Rhythm: Normal rate and regular rhythm.      Heart sounds: Normal heart sounds. No murmur heard.  Pulmonary:      Effort: Pulmonary effort is normal. No respiratory distress.   Abdominal:      General: Abdomen is flat. There is no distension.      Palpations: Abdomen is soft.      Tenderness: There is no abdominal tenderness. There is no guarding or rebound.   Musculoskeletal:         General: No tenderness.      Cervical back: Normal range of motion and neck supple.      Right lower leg: No edema.      Left lower leg: No edema.   Skin:     General: Skin is warm and dry.      Coloration: Skin is pale. Skin is not jaundiced.   Neurological:      General: No focal deficit present.      Mental Status: She is alert and oriented to person, place, and time. Mental status is at baseline.            Vents:  Oxygen Concentration (%): 32 (05/15/24 0400)  Lines/Drains/Airways       Peripheral Intravenous Line  Duration                  Peripheral IV - Single Lumen 05/14/24 1219 20 G Left Antecubital <1 day         Peripheral IV - Single Lumen 05/14/24 2100 18 G Anterior;Left;Medial Antecubital <1 day                  Significant Labs:    CBC/Anemia Profile:  Recent Labs   Lab 05/15/24  0026 05/15/24  0409 05/15/24  0641   WBC 34.02* 32.81* 33.74*   HGB 7.9* 7.5* 7.0*   HCT 24.2* 22.4* 21.0*    278 293   MCV 90 90 89   RDW 16.7* 17.2* 17.2*        Chemistries:  Recent Labs   Lab 05/14/24  1256 05/15/24  0409   * 138   K 3.1* 3.0*   CL 98 109   CO2 21* 19*   BUN 27* 21   CREATININE 1.2 0.8   CALCIUM 8.1* 7.7*   ALBUMIN 2.7* 2.1*   PROT 6.2 4.6*   BILITOT 0.6 1.0   ALKPHOS 70 55   ALT 19 15   AST 32 26   MG 1.1* 1.6   PHOS  --  3.8       CMP:   Recent Labs   Lab 05/14/24  1256 05/15/24  0409   * 138   K 3.1* 3.0*   CL 98 109   CO2 21*  19*   * 131*   BUN 27* 21   CREATININE 1.2 0.8   CALCIUM 8.1* 7.7*   PROT 6.2 4.6*   ALBUMIN 2.7* 2.1*   BILITOT 0.6 1.0   ALKPHOS 70 55   AST 32 26   ALT 19 15   ANIONGAP 16 10       Significant Imaging:  I have reviewed all pertinent imaging results/findings within the past 24 hours.

## 2024-05-15 NOTE — EICU
Nurses Note -- 4 Eyes      5/15/2024   12:20 AM      Skin assessed during: Admit      [] No Altered Skin Integrity Present    []Prevention Measures Documented      [x] Yes- Altered Skin Integrity Present or Discovered   [x] LDA Added if Not in Epic (Describe Wound)   [] New Altered Skin Integrity was Present on Admit and Documented in LDA   [x] Wound Image Taken    Wound Care Consulted? Yes    Attending Nurse:  Margaret Dominguez RN/Staff Member:   Aga

## 2024-05-15 NOTE — HOSPITAL COURSE
Admitted to ICU for lower GIB s/p embolization with IR 5/14. Colonoscopy performed 5/16 without evidence of active bleed, old blood/clots evacuated. Patient is s/p 8U pRBCs, 1 FFP, 1 platelet. Course complicated by worsening abdominal pain, general surgery consulted for further evaluation. Patient underwent ex lap 5/17 and found to have necrotic bowel at site of incarcerated inguinal hernia. On zosyn for intraabdominal coverage, BC NGTD. Patient oversedated on 5/18 and required BIPAP briefly for CO2 retention. Subsequently placed on precedex for agitation and IV tylenol for pain control. Precedex discontinued and tolerating QHS seroquel. Anemia stable, advanced to regular diet, on 1L NC. Patient reporting left lower extremity pain and weakness during this admission. Lower extremity xrays and ultrasounds negative for acute pathology. Most likely related to PAD however, outpatient neuro consultation placed for EMG to rule out alternative etiology.     Patient was stepped down to medicine 5/22 after reporting 2 normal non-bloody bowel movements with negative abdominal exam. On 5/23 the patient reported worsening abdominal pain and distension. She experienced a dark tarry stool over night, as well has worsening reflux. Daughter notes that patient spitting up large volume dark emesis. KUB was obtained concerning for ileus vs partial ABO, at which point surgery was notified. NG tube was inserted and placed to low intermittent suction, with ~3 liters removed since insertion. She also had an episode of dark stool. She stepped back up to MICU. Hgb stable, no further episodes of dark stools. Abdominal exam with significant improvement after NG tube drainage. NG tube removed 5/25, advancing diet slowly per surg. Stable for stepdown.

## 2024-05-15 NOTE — TRANSFER OF CARE
Anesthesia Transfer of Care Note    Patient: Jessica Floyd    Procedure(s) Performed: * No procedures listed *    Patient location: ICU    Anesthesia Type: general    Transport from OR: Transported from OR on 6-10 L/min O2 by face mask with adequate spontaneous ventilation. Continuous ECG monitoring in transport. Continuous SpO2 monitoring in transport. Continuos invasive BP monitoring in transport    Post pain: adequate analgesia    Post assessment: no apparent anesthetic complications    Post vital signs: stable    Level of consciousness: awake    Nausea/Vomiting: no nausea/vomiting    Complications: none    Transfer of care protocol was followed      Last vitals: Visit Vitals  BP (!) 142/66 (BP Location: Right arm, Patient Position: Lying)   Pulse 85   Temp 36.2 °C (97.1 °F) (Oral)   Resp 19   SpO2 99%

## 2024-05-15 NOTE — PROCEDURES
Radiology Post-Procedure Note    Pre Op Diagnosis: GI bleed  Post Op Diagnosis: Same    Procedure: Angiogram and embolization    Procedure performed by: Ruben Hutchinson MD    Written Informed Consent Obtained: Yes  Specimen Removed: NO  Estimated Blood Loss: Minimal    Findings:   R CFA access.  Extensive atherosclerotic disease noted throughout the right CFA, EIA, AJ, and SMA.  Multiple selective catheterizations and CBCTs demonstrated evidence of active bleeding corresponding to the site of CTA positive bleed.  Embolization performed with Embold coils.  Post-embolization angiography demonstrates satisfactory embolization of the target branch vessels.  Catheters and sheath removed.  Hemostasis achieved with Vascade closure device.  No complications.    Patient tolerated procedure well.    Ruben Hutchinson MD  Diagnostic and Interventional Radiologist  Department of Radiology  Pager: [phone]

## 2024-05-15 NOTE — H&P
Short Stay Endoscopy History and Physical    PCP - Effie Olmedo MD  Referring Physician - Harsha Dawson MD  [address] [place] [address]    Procedure - Endoscopy  ASA - per anesthesia  Mallampati - per anesthesia  History of Anesthesia problems - no  Family history Anesthesia problems -  no   Plan of anesthesia - General    HPI  73 y.o. female  Reason for procedure:   BRBPR    ROS:  Constitutional: No fevers, chills, No weight loss  CV: No chest pain  Pulm: No cough, No shortness of breath  GI: see HPI    Medical History:  has a past medical history of Allergic rhinitis, Amblyopia, Carotid stenosis, Coronary atherosclerosis, Dyslipidemia, ESBL (extended spectrum beta-lactamase) producing bacteria infection, Hypertension, Nausea and vomiting (05/26/2017), Pulmonary emphysema (10/28/2023), SAH (subarachnoid hemorrhage) (08/24/2016), and Subarachnoid hemorrhage due to ruptured aneurysm (1999).    Surgical History:  has a past surgical history that includes Anterior cruciate ligament repair (2012); Dental surgery; Cerebral aneurysm repair (1999); Tonsillectomy; Adenoidectomy; Appendectomy; Cataract extraction w/  intraocular lens implant (Right, 11/07/2022); Cataract extraction w/  intraocular lens implant (Left, 11/21/2022); Eye surgery (Bilateral); and Hip Arthroplasty (Left, 05/28/2023).    Family History: family history includes Alcohol abuse in her father; Aneurysm in her mother; Gout in her brother; Heart disease in her brother; Hypertension in her father and mother; Kidney disease in her brother; No Known Problems in her daughter, daughter, and daughter..    Social History:  reports that she quit smoking about 25 years ago. Her smoking use included cigarettes. She started smoking about 45 years ago. She has a 10 pack-year smoking history. She has been exposed to tobacco smoke. She has never used smokeless tobacco. She reports current alcohol use of about 7.0 standard drinks of alcohol per  week. She reports that she does not use drugs.    Review of patient's allergies indicates:  No Known Allergies    Medications:   Medications Prior to Admission   Medication Sig Dispense Refill Last Dose    acetaminophen (TYLENOL) 650 MG TbSR Take 1 tablet (650 mg total) by mouth every 6 to 8 hours as needed (pain (mild-moderate)). 120 tablet 0     allopurinoL (ZYLOPRIM) 100 MG tablet Take 2 tablets (200 mg total) by mouth once daily. 180 tablet 3     amLODIPine (NORVASC) 5 MG tablet Take 1 tablet (5 mg total) by mouth once daily. 90 tablet 3     aspirin (ECOTRIN) 81 MG EC tablet Take 81 mg by mouth once daily.       atorvastatin (LIPITOR) 80 MG tablet TAKE 1 TABLET BY MOUTH EVERY DAY 90 tablet 3     bacitracin 500 unit/gram ointment Apply topically 3 (three) times daily. (Patient taking differently: Apply topically 3 (three) times daily as needed (Skin injuries).)  0     carvediloL (COREG) 6.25 MG tablet Take 1 tablet (6.25 mg total) by mouth 2 (two) times daily with meals. 180 tablet 3     celecoxib (CELEBREX) 200 MG capsule Take 1 capsule (200 mg total) by mouth daily as needed for Pain. 30 capsule 2     colchicine (COLCRYS) 0.6 mg tablet Take 1 tablet (0.6 mg total) by mouth once daily. As needed for gout 30 tablet 11     cyanocobalamin 1,000 mcg/mL injection Inject 1mL intramuscularly once weekly. 30 mL 0     fluticasone furoate-vilanteroL (BREO) 100-25 mcg/dose diskus inhaler Inhale 1 puff into the lungs once daily. Controller 30 each 11     fluticasone propionate (FLONASE) 50 mcg/actuation nasal spray SPRAY 2 pumps INTO EACH NOSTRIL ONCE DAILY 16 mL 3     furosemide (LASIX) 20 MG tablet Take 1 tablet (20 mg total) by mouth daily as needed (Leg swelling, SOB, Weight gain 3lb in 1 day or 5 lbs in 2 days.). 30 tablet 11 5/12/2024    hydroCHLOROthiazide (HYDRODIURIL) 25 MG tablet Take 1 tablet (25 mg total) by mouth once daily. 90 tablet 3     montelukast (SINGULAIR) 10 mg tablet TAKE 1 TABLET BY MOUTH EVERY DAY  "IN THE EVENING 90 tablet 6     pantoprazole (PROTONIX) 40 MG tablet Take 1 tablet (40 mg total) by mouth once daily. 90 tablet 0     prenatal no122/iron/folic acid (PRENATAL MULTI ORAL) Take 1 tablet by mouth once daily.       QUEtiapine (SEROQUEL) 50 MG tablet Take 1 tablet (50 mg total) by mouth every evening. 90 tablet 1     sodium chloride (SALINE MIST NASL) Apply to each nostril daily for dryness       sodium chloride-aloe vera (SALINE NASAL, ALOE VERA,) Gel Apply to each nostril daily for dryness       tiotropium bromide (SPIRIVA RESPIMAT) 2.5 mcg/actuation inhaler INHALE 2 PUFFS INTO THE LUNGS ONCE DAILY. CONTROLLER 4 g 2     COMIRNATY 2023-24, 12Y UP,,PF, 30 mcg/0.3 mL inection        FLUAD QUAD 2023-24,65Y UP,,PF, 60 mcg (15 mcg x 4)/0.5 mL Syrg        needle, disp, 30 gauge 30 gauge x 1/2" Ndle Use a new needle once, use new needle daily for the first week, then a new needle once weekly for 8 weeks. 30 each 0     ORTHOVISC 30 mg/2 mL        syringe with needle (SYRINGE 3CC/22GX1") 3 mL 22 gauge x 1" Syrg 1 mL by Misc.(Non-Drug; Combo Route) route every 30 days. 15 each 0     VENOFER 100 mg iron/5 mL injection    Unknown       Physical Exam:    Vital Signs:   Vitals:    05/15/24 1200   BP: 139/68   Pulse: 101   Resp: (!) 33   Temp:        General Appearance: Well appearing in no acute distress  Abdomen: Soft, non tender, non distended with normal bowel sounds, no masses    Labs:  Lab Results   Component Value Date    WBC 33.74 (H) 05/15/2024    HGB 7.0 (L) 05/15/2024    HCT 21.0 (L) 05/15/2024     05/15/2024    CHOL 176 04/05/2022    TRIG 88 04/05/2022    HDL 69 04/05/2022    ALT 15 05/15/2024    AST 26 05/15/2024     05/15/2024    K 3.0 (L) 05/15/2024     05/15/2024    CREATININE 0.8 05/15/2024    BUN 21 05/15/2024    CO2 19 (L) 05/15/2024    TSH 0.719 10/28/2023    INR 1.0 05/15/2024    HGBA1C 5.2 11/18/2023       I have explained the risks and benefits of this endoscopic procedure to " the patient including but not limited to bleeding, inflammation, infection, perforation, and death.      Pieter Strickland MD

## 2024-05-15 NOTE — PLAN OF CARE
Pt tolerated gl embolizer well placed coils under crna care, rg groin closure device placed @1956, bed rest up @2156, dressing c/d/I, rg leg immobilizer place, report given @ bedside

## 2024-05-15 NOTE — ANESTHESIA POSTPROCEDURE EVALUATION
Anesthesia Post Evaluation    Patient: Jessica Floyd    Procedure(s) Performed: * No procedures listed *    Final Anesthesia Type: general      Patient location during evaluation: PACU  Patient participation: Yes- Able to Participate  Level of consciousness: awake and alert  Post-procedure vital signs: reviewed and stable  Pain management: adequate  Airway patency: patent    PONV status: None or treated.  Anesthetic complications: no      Cardiovascular status: hemodynamically stable  Respiratory status: spontaneous ventilation  Hydration status: euvolemic  Follow-up not needed.          Vitals Value Taken Time   /61 05/15/24 0502   Temp 36.8 °C (98.3 °F) 05/14/24 2045   Pulse 89 05/15/24 0504   Resp 19 05/15/24 0504   SpO2 99 % 05/15/24 0504   Vitals shown include unfiled device data.      No case tracking events are documented in the log.      Pain/Paris Score: No data recorded

## 2024-05-15 NOTE — PROVATION PATIENT INSTRUCTIONS
Discharge Summary/Instructions after an Endoscopic Procedure  Patient Name: Jessica Floyd  Patient MRN: [id number]  Patient YOB: 1950  Wednesday, May 15, 2024  Rich Syed MD  Dear patient,  As a result of recent federal legislation (The Federal Cures Act), you may   receive lab or pathology results from your procedure in your [facility]   account before your physician is able to contact you. Your physician or   their representative will relay the results to you with their   recommendations at their soonest availability.  Thank you,  RESTRICTIONS:  During your procedure today, you received medications for sedation.  These   medications may affect your judgment, balance and coordination.  Therefore,   for 24 hours, you have the following restrictions:   - DO NOT drive a car, operate machinery, make legal/financial decisions,   sign important papers or drink alcohol.    ACTIVITY:  Today: no heavy lifting, straining or running due to procedural   sedation/anesthesia.  The following day: return to full activity including work.  DIET:  Eat and drink normally unless instructed otherwise.     TREATMENT FOR COMMON SIDE EFFECTS:  - Mild abdominal pain, nausea, belching, bloating or excessive gas:  rest,   eat lightly and use a heating pad.  - Sore Throat: treat with throat lozenges and/or gargle with warm salt   water.  - Because air was used during the procedure, expelling large amounts of air   from your rectum or belching is normal.  - If a bowel prep was taken, you may not have a bowel movement for 1-3 days.    This is normal.  SYMPTOMS TO WATCH FOR AND REPORT TO YOUR PHYSICIAN:  1. Abdominal pain or bloating, other than gas cramps.  2. Chest pain.  3. Back pain.  4. Signs of infection such as: chills or fever occurring within 24 hours   after the procedure.  5. Rectal bleeding, which would show as bright red, maroon, or black stools.   (A tablespoon of blood from the rectum is not serious, especially if    hemorrhoids are present.)  6. Vomiting.  7. Weakness or dizziness.  GO DIRECTLY TO THE NEAREST EMERGENCY ROOM IF YOU HAVE ANY OF THE FOLLOWING:      Difficulty breathing              Chills and/or fever over 101 F   Persistent vomiting and/or vomiting blood   Severe abdominal pain   Severe chest pain   Black, tarry stools   Bleeding- more than one tablespoon   Any other symptom or condition that you feel may need urgent attention  Your doctor recommends these additional instructions:  If any biopsies were taken, your doctors clinic will contact you in 1 to 2   weeks with any results.  - Observe patient in ICU.   - Clear liquid diet.   - Continue present medications.   - Follow-up repeat CTA and if negative, will plan for colonoscopy.  For questions, problems or results please call your physician - Rich Syed MD at Work:  [phone].  [facility], EMERGENCY ROOM PHONE NUMBER: [phone]  IF A COMPLICATION OR EMERGENCY SITUATION ARISES AND YOU ARE UNABLE TO REACH   YOUR PHYSICIAN - GO DIRECTLY TO THE EMERGENCY ROOM.  Rich Syed MD  5/15/2024 4:37:36 PM  This report has been verified and signed electronically.  Dear patient,  As a result of recent federal legislation (The Federal Cures Act), you may   receive lab or pathology results from your procedure in your [facility]   account before your physician is able to contact you. Your physician or   their representative will relay the results to you with their   recommendations at their soonest availability.  Thank you,  PROVATION

## 2024-05-15 NOTE — PLAN OF CARE
MICU DAILY GOALS     Family/Goals of care/Code Status   Code Status: Full Code    24H Vital Sign Range  Temp:  [97.8 °F (36.6 °C)-98.8 °F (37.1 °C)]   Pulse:  []   Resp:  [14-42]   BP: ()/(51-83)   SpO2:  [82 %-100 %]      Shift Events (include procedures and significant events)   Pt with multiple tarry dark BM throughout shift. MD notified, CBC checks made more frequent. H/H resulted 6.4/18.9. 1 unit PRBCs ordered. CTA abdomen ordered. Endoscopy completed, nothing noted during scope (see note for more info). Colonoscopy planned for tomorrow, colon prep started at appx 1715.     AWAKE RASS: Goal - RASS Goal: 0-->alert and calm  Actual - RASS ([place]by Agitation-Sedation Scale): alert and calm    Restraint necessity: Not necessary   BREATHE SBT: Not intubated    Coordinate A & B, analgesics/sedatives Pain: managed   SAT: Not intubated   Delirium CAM-ICU: Overall CAM-ICU: Negative   Early(intubated/ Progressive (non-intubated) Mobility MOVE Screen (INTUBATED ONLY): Pass    Activity: Activity Management: Rolling - L1   Feeding/Nutrition Diet order: Diet/Nutrition Received: NPO,     Thrombus DVT prophylaxis: VTE Required Core Measure: Pharmacological prophylaxis initiated/maintained   HOB Elevation Head of Bed (HOB) Positioning: HOB at 30-45 degrees   Ulcer Prophylaxis GI: yes   Glucose control managed     Skin Skin assessed during: Daily Assessment    Sacrum intact/not altered? No  Heels intact/not altered? Yes  Surgical wound? Yes    CHECK ONE!   (no altered skin or altered skin) and sub boxes:  [] No Altered Skin Integrity Present    []Prevention Measures Documented    [x] Altered Skin Integrity Present or Discovered   [x] LDA present in EPIC, daily doc completed              [] LDA added if not in EPIC (describe wound).                    When describing wound, do not stage, use descriptive words only.    [] Wound Image Taken (required on admit,                   transfer/discharge and every  Tuesday)    Wound Care Consulted? Yes    4 EYES:  Attending Nurse (1st set of eyes): BARRY Mabry    Second RN/Staff Member (2nd set of eyes): Otis, RN   Bowel Function diarrhea. GIB.    Indwelling Catheter Necessity      Urethral Catheter 05/15/24 1600-Reason for Continuing Urinary Catheterization: Urinary retention          De-escalation Antibiotics No        VS and assessment per flow sheet, patient progressing towards goals as tolerated, plan of care reviewed with  patient and family at bedside , all concerns addressed, will continue to monitor.

## 2024-05-15 NOTE — ASSESSMENT & PLAN NOTE
Noted in history but last echo reveals normal function. Not on home medications.    Transthoracic echo (TTE) complete 10/29/2023    Interpretation Summary    Left Ventricle: The left ventricle is normal in size. Normal wall thickness. Normal wall motion. There is normal systolic function with a visually estimated ejection fraction of 60 - 65%. There is normal diastolic function.    Right Ventricle: Mild right ventricular enlargement. Wall thickness is normal. Right ventricle wall motion  is normal. Systolic function is normal.    Aortic Valve: The aortic valve is a unicuspid valve. There is mild aortic valve sclerosis. There is trace aortic regurgitation.    Mitral Valve: There is mild regurgitation.    Pulmonary Artery: There is mild pulmonary hypertension. The estimated pulmonary artery systolic pressure is 40 mmHg.    IVC/SVC: Intermediate venous pressure at 8 mmHg.

## 2024-05-15 NOTE — PROGRESS NOTES
"[facility]  Critical Care Medicine  Progress Note    Patient Name: Jsesica Floyd  MRN: [id number]  Admission Date: 5/14/2024  Hospital Length of Stay: 1 days  Code Status: Full Code  Attending Provider: Sachin Rich*  Primary Care Provider: Effie Olmedo MD   Principal Problem: GI bleed    Subjective:     HPI:  Mrs. Floyd is a 73 year old female with PMH notable for COPD on home 2L NC, pulm HTN, R carotid stent on ASA, HFpEF, anemia, and ERIK on infusions who presented to [facility] ED with the melena.  is at bedside and reports that the patient had an episode of diarrhea and lethargy yesterday. She reports that it felt as if "she was going to pass out", so she was using her walker to get around. Patient's  reports that this has never happened before. Patient denies excessive OTC NSAID use. She reports that she drinks 2 wine glasses a day. This morning when the patient woke up she felt very lethargic. Patient went to use the bathroom, and  noted bright red stool in the bowl. Patient reported a pre syncopal episode and felt lightheaded. She admits to weakness, SOB, light-headedness, hematochezia, and pre syncope. Patient denies nausea, vomiting, hematemesis, falls, confusion, chest pain, urinary changes, and fevers. Patient reports taking aspirin daily but no DOAC.     Hemoglobin 5.9 at presentation to ED (baseline ~10). GI and IR consulted given concern for active bleed seen on CTA. In the emergency department she was given 1L IVF and a 80 mg IV protonix bolus. Critical care medicine. consulted for GIB. CTA in the Ed revealed "Acute gastrointestinal bleed at the level of the cecum". IR planning for embolization today. Patient is also receiving 2 pRBC due to acute anemia.     Hospital/ICU Course:  Patient admitted with lower GI bleed with multiple episodes of blood per rectum. Initially hypotensive on arrival and MICU consulted. Hgb initially 5.9 in ED down from baseline of " 10. Received two units of blood. CTA abdomen pelvis showed acute bleed within cecum. GI and IR consulted. IR took her for coiling night of admission. Blood pressure improved with fluids and blood. GI planning for colonoscopy.    Interval History/Significant Events: Patient had coiling procedure overnight and tolerated procedure well. GI saw patient today and plans for colonoscopy tomorrow.    Review of Systems   Constitutional:  Negative for chills and fever.   HENT:  Positive for nosebleeds. Negative for rhinorrhea, sore throat and trouble swallowing.    Eyes:  Negative for visual disturbance.   Respiratory:  Positive for shortness of breath. Negative for cough and wheezing.    Cardiovascular:  Negative for chest pain and leg swelling.   Gastrointestinal:  Positive for blood in stool and diarrhea. Negative for abdominal distention, abdominal pain, nausea and vomiting.   Endocrine: Positive for cold intolerance.   Genitourinary:  Negative for dysuria, hematuria and pelvic pain.   Musculoskeletal:  Negative for back pain.   Skin:  Positive for pallor.   Neurological:  Positive for syncope, weakness and light-headedness.   Psychiatric/Behavioral:  Negative for confusion and decreased concentration.      Objective:     Vital Signs (Most Recent):  Temp: 98.8 °F (37.1 °C) (05/15/24 0700)  Pulse: 93 (05/15/24 0900)  Resp: 16 (05/15/24 0900)  BP: (!) 90/51 (05/15/24 0900)  SpO2: 98 % (05/15/24 0900) Vital Signs (24h Range):  Temp:  [96.8 °F (36 °C)-98.8 °F (37.1 °C)] 98.8 °F (37.1 °C)  Pulse:  [] 93  Resp:  [14-42] 16  SpO2:  [95 %-100 %] 98 %  BP: ()/(47-83) 90/51   Weight: 52.6 kg (115 lb 15.4 oz)  Body mass index is 20.54 kg/m².      Intake/Output Summary (Last 24 hours) at 5/15/2024 0949  Last data filed at 5/15/2024 0600  Gross per 24 hour   Intake 3217.96 ml   Output 300 ml   Net 2917.96 ml          Physical Exam  Vitals and nursing note reviewed.   Constitutional:       General: She is not in acute  distress.     Comments: Patient irritated today and states she has not been able to sleep.   HENT:      Head: Normocephalic and atraumatic.      Mouth/Throat:      Mouth: Mucous membranes are dry.      Pharynx: Oropharynx is clear.   Eyes:      General: No scleral icterus.     Extraocular Movements: Extraocular movements intact.      Conjunctiva/sclera: Conjunctivae normal.   Cardiovascular:      Rate and Rhythm: Normal rate and regular rhythm.      Heart sounds: Normal heart sounds. No murmur heard.  Pulmonary:      Effort: Pulmonary effort is normal. No respiratory distress.   Abdominal:      General: Abdomen is flat. There is no distension.      Palpations: Abdomen is soft.      Tenderness: There is no abdominal tenderness. There is no guarding or rebound.   Musculoskeletal:         General: No tenderness.      Cervical back: Normal range of motion and neck supple.      Right lower leg: No edema.      Left lower leg: No edema.   Skin:     General: Skin is warm and dry.      Coloration: Skin is pale. Skin is not jaundiced.   Neurological:      General: No focal deficit present.      Mental Status: She is alert and oriented to person, place, and time. Mental status is at baseline.            Vents:  Oxygen Concentration (%): 32 (05/15/24 0400)  Lines/Drains/Airways       Peripheral Intravenous Line  Duration                  Peripheral IV - Single Lumen 05/14/24 1219 20 G Left Antecubital <1 day         Peripheral IV - Single Lumen 05/14/24 2100 18 G Anterior;Left;Medial Antecubital <1 day                  Significant Labs:    CBC/Anemia Profile:  Recent Labs   Lab 05/15/24  0026 05/15/24  0409 05/15/24  0641   WBC 34.02* 32.81* 33.74*   HGB 7.9* 7.5* 7.0*   HCT 24.2* 22.4* 21.0*    278 293   MCV 90 90 89   RDW 16.7* 17.2* 17.2*        Chemistries:  Recent Labs   Lab 05/14/24  1256 05/15/24  0409   * 138   K 3.1* 3.0*   CL 98 109   CO2 21* 19*   BUN 27* 21   CREATININE 1.2 0.8   CALCIUM 8.1* 7.7*  "  ALBUMIN 2.7* 2.1*   PROT 6.2 4.6*   BILITOT 0.6 1.0   ALKPHOS 70 55   ALT 19 15   AST 32 26   MG 1.1* 1.6   PHOS  --  3.8       CMP:   Recent Labs   Lab 05/14/24  1256 05/15/24  0409   * 138   K 3.1* 3.0*   CL 98 109   CO2 21* 19*   * 131*   BUN 27* 21   CREATININE 1.2 0.8   CALCIUM 8.1* 7.7*   PROT 6.2 4.6*   ALBUMIN 2.7* 2.1*   BILITOT 0.6 1.0   ALKPHOS 70 55   AST 32 26   ALT 19 15   ANIONGAP 16 10       Significant Imaging:  I have reviewed all pertinent imaging results/findings within the past 24 hours.    ABG  No results for input(s): "PH", "PO2", "PCO2", "HCO3", "BE" in the last 168 hours.  Assessment/Plan:     Pulmonary  Pulmonary emphysema  Not on oxygen at home.     -Continue home inhalers  -Duonebs PRN    Cardiac/Vascular  (HFpEF) heart failure with preserved ejection fraction  Noted in history but last echo reveals normal function. Not on home medications.    Transthoracic echo (TTE) complete 10/29/2023    Interpretation Summary    Left Ventricle: The left ventricle is normal in size. Normal wall thickness. Normal wall motion. There is normal systolic function with a visually estimated ejection fraction of 60 - 65%. There is normal diastolic function.    Right Ventricle: Mild right ventricular enlargement. Wall thickness is normal. Right ventricle wall motion  is normal. Systolic function is normal.    Aortic Valve: The aortic valve is a unicuspid valve. There is mild aortic valve sclerosis. There is trace aortic regurgitation.    Mitral Valve: There is mild regurgitation.    Pulmonary Artery: There is mild pulmonary hypertension. The estimated pulmonary artery systolic pressure is 40 mmHg.    IVC/SVC: Intermediate venous pressure at 8 mmHg.        Hypertension  Hypertension Medications                    amLODIPine (NORVASC) 5 MG tablet TAKE 1 TABLET BY MOUTH EVERY DAY     carvediloL (COREG) 6.25 MG tablet Take 1 tablet (6.25 mg total) by mouth 2 (two) times daily with meals.     " hydroCHLOROthiazide (HYDRODIURIL) 25 MG tablet Take 1 tablet (25 mg total) by mouth once daily.       -Will hold antihypertensives in setting of acute bleed and hypotension    Bilateral carotid artery stenosis  April 2022     No evidence of hemodynamically significant stenosis is demonstrated in the extracranial carotid arteries.  Patent right carotid stent    GI  * GI bleed  73 year old female with multiple medical problems presenting with melena and some bright red blood with associated pre-syncope found to have hgb of 5.9 (from baseline ~10). CTA with acute gastrointestinal bleed at the level of the cecum. Patient had embolization by IR but continued to to have larger volume dark red blood in stool with falling hgb so CTA a/p repeated.     -- Repeat CTA a/p pending   -- GI consulted, plans for EGD and colonoscopy  -- IR consulted, embolization performed 5/14  -- NPO   -- 2 u PRBC given in ED   -- Protonix 80 mg IV x 1 and 40 mg BID   -- Hold all A/C and A/P agents   -- Correct any coagulopathy with platelets and FFP for goal of platelets > 50K and INR <2.0   -- Maintain large bore IV access   -- MAP > 65 goal   -- Maintain type and screen   -- Trend CBCs    Other  History Situational (ie Stress Induced) syncope ([facility] admission 12-25-23  History of syncope. Thought to be reflex mediated. Had pre-syncopal episode at home after having diarrhea but before she noticed active bleeding.        Critical care was time spent personally by me on the following activities: development of treatment plan with patient or surrogate and bedside caregivers, discussions with consultants, evaluation of patient's response to treatment, examination of patient, ordering and performing treatments and interventions, ordering and review of laboratory studies, ordering and review of radiographic studies, pulse oximetry, re-evaluation of patient's condition. This critical care time did not overlap with that of any other provider or  involve time for any procedures.     Rick Ventura,   Critical Care Medicine  [facility]

## 2024-05-15 NOTE — PLAN OF CARE
MICU DAILY GOALS     Family/Goals of care/Code Status   Code Status: Full Code    24H Vital Sign Range  Temp:  [96.8 °F (36 °C)-98.3 °F (36.8 °C)]   Pulse:  []   Resp:  [14-42]   BP: ()/(47-83)   SpO2:  [95 %-100 %]      Shift Events (include procedures and significant events)   No acute events throughout shift. Pt arrived on unit @ 2025. Multiple dark tarry BM with clots overnight. MD notified. VSS. Daughter at bedside updated on plan of care.     AWAKE RASS: Goal - RASS Goal: 0-->alert and calm  Actual - RASS ([place] Agitation-Sedation Scale): alert and calm    Restraint necessity: Not necessary   BREATHE SBT: Not intubated    Coordinate A & B, analgesics/sedatives Pain: managed   SAT: Not intubated   Delirium CAM-ICU: Overall CAM-ICU: Negative   Early(intubated/ Progressive (non-intubated) Mobility MOVE Screen (INTUBATED ONLY): Not intubated    Activity: Activity Management: Rolling - L1, Leg kicks - L2, Heel slide - L1   Feeding/Nutrition Diet order: Diet/Nutrition Received: NPO,     Thrombus DVT prophylaxis:     HOB Elevation Head of Bed (HOB) Positioning: HOB at 30-45 degrees   Ulcer Prophylaxis GI: yes   Glucose control managed     Skin Skin assessed during: Daily Assessment    Sacrum intact/not altered? No  Heels intact/not altered? Yes  Surgical wound? Yes    CHECK ONE!   (no altered skin or altered skin) and sub boxes:  [] No Altered Skin Integrity Present    []Prevention Measures Documented    [x] Altered Skin Integrity Present or Discovered   [x] LDA present in EPIC, daily doc completed              [x] LDA added if not in EPIC (describe wound).                    When describing wound, do not stage, use descriptive words only.    [x] Wound Image Taken (required on admit,                   transfer/discharge and every Tuesday)    Wound Care Consulted? Yes    4 EYES:  Attending Nurse (1st set of eyes): BARRY Cole    Second RN/Staff Member (2nd set of eyes): BARRY Bach   Bowel Function GI  Bleed   Indwelling Catheter Necessity            De-escalation Antibiotics Yes        VS and assessment per flow sheet, patient progressing towards goals as tolerated, plan of care reviewed with patient and family, all concerns addressed, will continue to monitor.

## 2024-05-15 NOTE — ANESTHESIA PREPROCEDURE EVALUATION
[facility]  Anesthesia Pre-Operative Evaluation         Patient Name: Jessica Floyd  YOB: 1950  MRN: [id number]    SUBJECTIVE:     Pre-operative evaluation for Procedure(s) (LRB):  EGD (ESOPHAGOGASTRODUODENOSCOPY) (N/A)     05/15/2024    Jessica Floyd is a 73 y.o. female w/ a significant PMHx of  COPD on home 2L NC, pulm HTN, R carotid stent on ASA, HFpEF, anemia, and ERIK on infusions who presented to [facility] ED with the melena, now s/p IR embolization on 5/14/24.     Patient now presents for the above procedure(s).      TTE 10/29/2023:    Left Ventricle: The left ventricle is normal in size. Normal wall thickness. Normal wall motion. There is normal systolic function with a visually estimated ejection fraction of 60 - 65%. There is normal diastolic function.    Right Ventricle: Mild right ventricular enlargement. Wall thickness is normal. Right ventricle wall motion  is normal. Systolic function is normal.    Aortic Valve: The aortic valve is a unicuspid valve. There is mild aortic valve sclerosis. There is trace aortic regurgitation.    Mitral Valve: There is mild regurgitation.    Pulmonary Artery: There is mild pulmonary hypertension. The estimated pulmonary artery systolic pressure is 40 mmHg.    IVC/SVC: Intermediate venous pressure at 8 mmHg.    LDA:        Peripheral IV - Single Lumen 05/14/24 1219 20 G Left Antecubital (Active)   Site Assessment Clean;Dry;Intact;No redness;No swelling 05/15/24 1100   Extremity Assessment Distal to IV No abnormal discoloration;No redness;No swelling;No warmth 05/15/24 1100   Line Status Flushed 05/15/24 1100   Dressing Status Clean;Dry;Intact 05/15/24 1100   Dressing Intervention Integrity maintained 05/15/24 1100   Dressing Change Due 05/18/24 05/15/24 1100   Site Change Due 05/18/24 05/15/24 0700   Reason Not Rotated Not due 05/15/24 1100   Number of days: 1            Peripheral IV - Single Lumen 05/14/24 2100 18 G Anterior;Left;Medial  Antecubital (Active)   Site Assessment Clean;Dry;Intact;No redness;No swelling 05/15/24 1100   Extremity Assessment Distal to IV No abnormal discoloration;No redness;No swelling;No warmth 05/15/24 1100   Line Status Flushed 05/15/24 1100   Dressing Status Clean;Dry;Intact 05/15/24 1100   Dressing Intervention Integrity maintained 05/15/24 1100   Dressing Change Due 05/18/24 05/15/24 1100   Site Change Due 05/18/24 05/15/24 0700   Reason Not Rotated Not due 05/15/24 1100   Number of days: 0       Prev airway: None documented.    Drips: None documented.      Patient Active Problem List   Diagnosis    Coronary artery disease involving native coronary artery of native heart without angina pectoris    Bilateral carotid artery stenosis    Gastroesophageal reflux disease without esophagitis    CKD stage G3a/A1, GFR 45-59 and albumin creatinine ratio <30 mg/g    Hypertension    Subclinical hyperthyroidism    Allergic rhinitis    Iron deficiency anemia secondary to blood loss (chronic)    History of fracture of left hip    Malnutrition of mild degree    Pathological fracture due to osteoporosis    Osteoporosis    Localized osteoporosis of [place]    Postnasal drip    Reduced vision-Left eye    Gout    Right rotator cuff tear arthropathy    SOB (shortness of breath)    Eosinophilia    Anxiety    PAC (premature atrial contraction)    Aortic atherosclerosis    COVID-19    Pulmonary emphysema    Weight loss    Moderate malnutrition    Influenza A    (HFpEF) heart failure with preserved ejection fraction    History Situational (ie Stress Induced) syncope ([facility] 12-25-23    Nicotine dependence, cigarettes, in FULL REMISSION:in past smoked for 17yrs from 16y to about 34y / LUPE:F pack a day    GI bleed       Review of patient's allergies indicates:  No Known Allergies    Current Inpatient Medications:   allopurinoL  200 mg Oral Daily    fluticasone furoate-vilanteroL  1 puff Inhalation Daily    pantoprazole  40 mg  Intravenous BID       Current Facility-Administered Medications on File Prior to Encounter   Medication Dose Route Frequency Provider Last Rate Last Admin    mupirocin 2 % ointment   Nasal On Call Procedure Kang Diaz MD   Given at 10/25/23 1045    tranexamic acid (CYKLOKAPRON) 1,000 mg in sodium chloride 0.9 % 100 mL IVPB (MB+)  1,000 mg Intravenous Once Kang Diaz MD        tranexamic acid (CYKLOKAPRON) 1,000 mg in sodium chloride 0.9 % 100 mL IVPB (MB+)  1,000 mg Intravenous Once Kang Diaz MD         Current Outpatient Medications on File Prior to Encounter   Medication Sig Dispense Refill    acetaminophen (TYLENOL) 650 MG TbSR Take 1 tablet (650 mg total) by mouth every 6 to 8 hours as needed (pain (mild-moderate)). 120 tablet 0    allopurinoL (ZYLOPRIM) 100 MG tablet Take 2 tablets (200 mg total) by mouth once daily. 180 tablet 3    amLODIPine (NORVASC) 5 MG tablet Take 1 tablet (5 mg total) by mouth once daily. 90 tablet 3    aspirin (ECOTRIN) 81 MG EC tablet Take 81 mg by mouth once daily.      atorvastatin (LIPITOR) 80 MG tablet TAKE 1 TABLET BY MOUTH EVERY DAY 90 tablet 3    bacitracin 500 unit/gram ointment Apply topically 3 (three) times daily. (Patient taking differently: Apply topically 3 (three) times daily as needed (Skin injuries).)  0    carvediloL (COREG) 6.25 MG tablet Take 1 tablet (6.25 mg total) by mouth 2 (two) times daily with meals. 180 tablet 3    celecoxib (CELEBREX) 200 MG capsule Take 1 capsule (200 mg total) by mouth daily as needed for Pain. 30 capsule 2    colchicine (COLCRYS) 0.6 mg tablet Take 1 tablet (0.6 mg total) by mouth once daily. As needed for gout 30 tablet 11    cyanocobalamin 1,000 mcg/mL injection Inject 1mL intramuscularly once weekly. 30 mL 0    fluticasone furoate-vilanteroL (BREO) 100-25 mcg/dose diskus inhaler Inhale 1 puff into the lungs once daily. Controller 30 each 11    fluticasone propionate (FLONASE) 50 mcg/actuation nasal spray SPRAY 2  "pumps INTO EACH NOSTRIL ONCE DAILY 16 mL 3    furosemide (LASIX) 20 MG tablet Take 1 tablet (20 mg total) by mouth daily as needed (Leg swelling, SOB, Weight gain 3lb in 1 day or 5 lbs in 2 days.). 30 tablet 11    hydroCHLOROthiazide (HYDRODIURIL) 25 MG tablet Take 1 tablet (25 mg total) by mouth once daily. 90 tablet 3    montelukast (SINGULAIR) 10 mg tablet TAKE 1 TABLET BY MOUTH EVERY DAY IN THE EVENING 90 tablet 6    pantoprazole (PROTONIX) 40 MG tablet Take 1 tablet (40 mg total) by mouth once daily. 90 tablet 0    prenatal no122/iron/folic acid (PRENATAL MULTI ORAL) Take 1 tablet by mouth once daily.      QUEtiapine (SEROQUEL) 50 MG tablet Take 1 tablet (50 mg total) by mouth every evening. 90 tablet 1    sodium chloride (SALINE MIST NASL) Apply to each nostril daily for dryness      sodium chloride-aloe vera (SALINE NASAL, ALOE VERA,) Gel Apply to each nostril daily for dryness      tiotropium bromide (SPIRIVA RESPIMAT) 2.5 mcg/actuation inhaler INHALE 2 PUFFS INTO THE LUNGS ONCE DAILY. CONTROLLER 4 g 2    COMIRNATY 2023-24, 12Y UP,,PF, 30 mcg/0.3 mL inection       FLUAD QUAD 2023-24,65Y UP,,PF, 60 mcg (15 mcg x 4)/0.5 mL Syrg       needle, disp, 30 gauge 30 gauge x 1/2" Ndle Use a new needle once, use new needle daily for the first week, then a new needle once weekly for 8 weeks. 30 each 0    ORTHOVISC 30 mg/2 mL       syringe with needle (SYRINGE 3CC/22GX1") 3 mL 22 gauge x 1" Syrg 1 mL by Misc.(Non-Drug; Combo Route) route every 30 days. 15 each 0    VENOFER 100 mg iron/5 mL injection          Past Surgical History:   Procedure Laterality Date    ADENOIDECTOMY      ANTERIOR CRUCIATE LIGAMENT REPAIR  2012    APPENDECTOMY      CATARACT EXTRACTION W/  INTRAOCULAR LENS IMPLANT Right 11/07/2022    Procedure: EXTRACTION, CATARACT, WITH IOL INSERTION;  Surgeon: Higinio Cristina MD;  Location: [facility];  Service: Ophthalmology;  Laterality: Right;    CATARACT EXTRACTION W/  INTRAOCULAR LENS IMPLANT Left 11/21/2022    " Procedure: EXTRACTION, CATARACT, WITH IOL INSERTION;  Surgeon: Higinio Cristina MD;  Location: [facility];  Service: Ophthalmology;  Laterality: Left;    CEREBRAL ANEURYSM REPAIR      clip    DENTAL SURGERY      EYE SURGERY Bilateral     cataract removal and lens implants    HIP ARTHROPLASTY Left 2023    Procedure: TOTAL HIP ARTHROPLASTY;  Surgeon: Juan Wan MD;  Location: [facility] OR [address] [place] [address];  Service: Orthopedics;  Laterality: Left;    TONSILLECTOMY         Social History     Socioeconomic History    Marital status:    Occupational History    Occupation: Retired   Tobacco Use    Smoking status: Former     Current packs/day: 0.00     Average packs/day: 0.5 packs/day for 20.0 years (10.0 ttl pk-yrs)     Types: Cigarettes     Start date: 1979     Quit date: 1999     Years since quittin.3     Passive exposure: Past    Smokeless tobacco: Never   Substance and Sexual Activity    Alcohol use: Yes     Alcohol/week: 7.0 standard drinks of alcohol     Types: 7 Glasses of wine per week     Comment: One glass of Red wine prior to dinner    Drug use: No    Sexual activity: Yes     Partners: Male   Social History Narrative    .  Has 3 grown daughters.       Social Determinants of Health     Financial Resource Strain: Low Risk  (2023)    Overall Financial Resource Strain (CARDIA)     Difficulty of Paying Living Expenses: Not hard at all   Food Insecurity: No Food Insecurity (2023)    Hunger Vital Sign     Worried About Running Out of Food in the Last Year: Never true     Ran Out of Food in the Last Year: Never true   Transportation Needs: No Transportation Needs (2023)    PRAPARE - Transportation     Lack of Transportation (Medical): No     Lack of Transportation (Non-Medical): No   Physical Activity: Insufficiently Active (2023)    Exercise Vital Sign     Days of Exercise per Week: 1 day     Minutes of Exercise per Session: 30 min   Stress: No Stress Concern  Present (11/27/2023)    [employer] - Occupational Stress Questionnaire     Feeling of Stress : Not at all   Recent Concern: Stress - Stress Concern Present (10/28/2023)    [employer] - Occupational Stress Questionnaire     Feeling of Stress : To some extent   Housing Stability: Low Risk  (11/27/2023)    Housing Stability Vital Sign     Unable to Pay for Housing in the Last Year: No     Number of Places Lived in the Last Year: 1     Unstable Housing in the Last Year: No       OBJECTIVE:     Vital Signs Range (Last 24H):  Temp:  [36.2 °C (97.1 °F)-37.1 °C (98.8 °F)]   Pulse:  []   Resp:  [14-42]   BP: ()/(47-83)   SpO2:  [95 %-100 %]       Significant Labs:  Lab Results   Component Value Date    WBC 33.74 (H) 05/15/2024    HGB 7.0 (L) 05/15/2024    HCT 21.0 (L) 05/15/2024     05/15/2024    CHOL 176 04/05/2022    TRIG 88 04/05/2022    HDL 69 04/05/2022    ALT 15 05/15/2024    AST 26 05/15/2024     05/15/2024    K 3.0 (L) 05/15/2024     05/15/2024    CREATININE 0.8 05/15/2024    BUN 21 05/15/2024    CO2 19 (L) 05/15/2024    TSH 0.719 10/28/2023    INR 1.0 05/15/2024    HGBA1C 5.2 11/18/2023       Diagnostic Studies: No relevant studies.    EKG:   Results for orders placed or performed during the hospital encounter of 05/14/24   EKG 12-lead    Collection Time: 05/14/24 12:36 PM   Result Value Ref Range    QRS Duration 80 ms    [facility] QTC Calculation 461 ms    Narrative    Test Reason : K92.1,    Vent. Rate : 102 BPM     Atrial Rate : 102 BPM     P-R Int : 134 ms          QRS Dur : 080 ms      QT Int : 354 ms       P-R-T Axes : 082 041 256 degrees     QTc Int : 461 ms    Sinus tachycardia with Premature supraventricular complexes  ST and T wave abnormality, consider inferolateral ischemia  Abnormal ECG  When compared with ECG of 27-DEC-2023 10:37,  T wave inversions now present in the inferolateral leads  Confirmed by Yenni WALLER, Michelle  ([id number]) on 5/14/2024 12:59:14 PM    Referred By: [email or web address]   SELF           Confirmed By:Michelle Hyman MD       2D ECHO:  TTE:  Results for orders placed or performed during the hospital encounter of 10/28/23   Echo   Result Value Ref Range    Ascending aorta 4.14 cm    STJ 2.98 cm    AV mean gradient 4 mmHg    Ao peak rah 1.33 m/s    Ao VTI 27.32 cm    IVS 0.79 0.6 - 1.1 cm    LA size 2.93 cm    Left Atrium Major Axis 3.88 cm    Left Atrium Minor Axis 4.39 cm    LVIDd 4.98 3.5 - 6.0 cm    LVIDs 3.18 2.1 - 4.0 cm    LVOT diameter 2.20 cm    LVOT peak VTI 17.59 cm    Posterior Wall 0.67 0.6 - 1.1 cm    MV Peak A Rah 0.79 m/s    E wave deceleration time 244.94 msec    MV Peak E Rah 0.55 m/s    RA Major Axis 4.35 cm    RA Width 4.45 cm    RVDD 3.60 cm    Sinus 3.69 cm    TAPSE 1.78 cm    TR Max Rah 2.84 m/s    LA WIDTH 4.23 cm    MV stenosis pressure 1/2 time 71.03 ms    LV Diastolic Volume 116.90 mL    LV Systolic Volume 40.37 mL    LVOT peak rah 0.83 m/s    TDI LATERAL 0.08 m/s    TDI SEPTAL 0.04 m/s    LA volume (mod) 47.60 cm3    LV LATERAL E/E' RATIO 6.88 m/s    LV SEPTAL E/E' RATIO 13.75 m/s    FS 36 %    LA volume 43.40 cm3    LV mass 120.06 g    ZLVIDD 0.96     ZLVIDS 0.99     Left Ventricle Relative Wall Thickness 0.27 cm    AV valve area 2.45 cm²    AV Velocity Ratio 0.62     AV index (prosthetic) 0.64     MV valve area p 1/2 method 3.10 cm2    E/A ratio 0.70     Mean e' 0.06 m/s    LVOT area 3.8 cm2    LVOT stroke volume 66.83 cm3    AV peak gradient 7 mmHg    E/E' ratio 9.17 m/s    LV Systolic Volume Index 25.6 mL/m2    LV Diastolic Volume Index 73.99 mL/m2    LA Volume Index 27.5 mL/m2    LV Mass Index 76 g/m2    Triscuspid Valve Regurgitation Peak Gradient 32 mmHg    LA Volume Index (Mod) 30.1 mL/m2    RUDDY by Velocity Ratio 2.37 cm²    BSA 1.57 m2    TV resting pulmonary artery pressure 40 mmHg    RV TB RVSP 11 mmHg    Est. RA pres 8 mmHg    Narrative      Left Ventricle: The left ventricle is normal in  size. Normal wall   thickness. Normal wall motion. There is normal systolic function with a   visually estimated ejection fraction of 60 - 65%. There is normal   diastolic function.    Right Ventricle: Mild right ventricular enlargement. Wall thickness is   normal. Right ventricle wall motion  is normal. Systolic function is   normal.    Aortic Valve: The aortic valve is a unicuspid valve. There is mild   aortic valve sclerosis. There is trace aortic regurgitation.    Mitral Valve: There is mild regurgitation.    Pulmonary Artery: There is mild pulmonary hypertension. The estimated   pulmonary artery systolic pressure is 40 mmHg.    IVC/SVC: Intermediate venous pressure at 8 mmHg.         MALLY:  No results found. However, due to the size of the patient record, not all encounters were searched. Please check Results Review for a complete set of results.    ASSESSMENT/PLAN:         Pre-op Assessment    I have reviewed the Patient Summary Reports.     I have reviewed the Nursing Notes.    I have reviewed the Medications.     Review of Systems  Anesthesia Hx:  No problems with previous Anesthesia   History of prior surgery of interest to airway management or planning:          Denies Family Hx of Anesthesia complications.    Denies Personal Hx of Anesthesia complications.                    Social:  No Alcohol Use, Former Smoker       Hematology/Oncology:       -- Anemia:                  Denies Current/Recent Cancer                Cardiovascular:     Hypertension   CAD     Denies Dysrhythmias.    Denies CHF.    hyperlipidemia                             Pulmonary:   COPD  Denies Asthma.     Denies Sleep Apnea.                Renal/:   Denies Chronic Renal Disease.                Hepatic/GI:      Denies GERD. Denies Liver Disease.            Musculoskeletal:  Denies Arthritis.               Neurological:    Denies CVA. Denies Neuromuscular Disease.   Denies Seizures.          Denies Chronic Pain Syndrome                          Endocrine:  Denies Diabetes.   Denies Hyperthyroidism.       Denies Obesity / BMI > 30  Psych:   denies anxiety denies depression                Physical Exam  General: Well nourished, Cooperative, Alert and Oriented    Airway:  Mallampati: III   Mouth Opening: Normal  TM Distance: Normal  Tongue: Normal  Neck ROM: Normal ROM    Dental:  Intact        Anesthesia Plan  Type of Anesthesia, risks & benefits discussed:    Anesthesia Type: Gen Natural Airway, MAC, Gen ETT  Intra-op Monitoring Plan: Standard ASA Monitors  Post Op Pain Control Plan: multimodal analgesia  Induction:  IV  Airway Plan: Direct, Post-Induction  Informed Consent: Informed consent signed with the Patient and all parties understand the risks and agree with anesthesia plan.  All questions answered.   ASA Score: 3  Day of Surgery Review of History & Physical: H&P Update referred to the surgeon/provider.    Ready For Surgery From Anesthesia Perspective.     .

## 2024-05-15 NOTE — ASSESSMENT & PLAN NOTE
73 year old female with multiple medical problems presenting with melena and some bright red blood with associated pre-syncope found to have hgb of 5.9 (from baseline ~10). CTA with acute gastrointestinal bleed at the level of the cecum. Patient had embolization by IR but continued to to have larger volume dark red blood in stool with falling hgb so CTA a/p repeated.     -- Repeat CTA a/p pending   -- GI consulted, plans for EGD and colonoscopy  -- IR consulted, embolization performed 5/14  -- NPO   -- 2 u PRBC given in ED   -- Protonix 80 mg IV x 1 and 40 mg BID   -- Hold all A/C and A/P agents   -- Correct any coagulopathy with platelets and FFP for goal of platelets > 50K and INR <2.0   -- Maintain large bore IV access   -- MAP > 65 goal   -- Maintain type and screen   -- Trend CBCs

## 2024-05-15 NOTE — TRANSFER OF CARE
Anesthesia Transfer of Care Note    Patient: Jessica Floyd    Procedure(s) Performed: Procedure(s) (LRB):  EGD (ESOPHAGOGASTRODUODENOSCOPY) (N/A)    Patient location: ICU    Anesthesia Type: general    Transport from OR: Transported from OR on 2-3 L/min O2 by NC with adequate spontaneous ventilation    Post pain: adequate analgesia    Post assessment: no apparent anesthetic complications and tolerated procedure well    Post vital signs: stable    Level of consciousness: awake, alert and oriented    Nausea/Vomiting: no nausea/vomiting    Complications: none    Transfer of care protocol was followed      Last vitals: Visit Vitals  BP (!) 102/56 (BP Location: Right arm, Patient Position: Lying)   Pulse 99   Temp 36.8 °C (98.3 °F) (Axillary)   Resp (!) 28   Wt 52.6 kg (115 lb 15.4 oz)   SpO2 100%   BMI 20.54 kg/m²

## 2024-05-15 NOTE — PROGRESS NOTES
[facility] Cardiac Medical ICU  Wound Care    Patient Name:  Jessica Floyd   MRN:  [id number]  Date: 5/15/2024  Diagnosis: GI bleed    History:     Past Medical History:   Diagnosis Date    Allergic rhinitis     Amblyopia     Carotid stenosis     Coronary atherosclerosis     [facility] 2014: 20% mLAD, 50% mCx, 20%, mRCA    Dyslipidemia     ESBL (extended spectrum beta-lactamase) producing bacteria infection     UTI    Hypertension     Nausea and vomiting 2017    Pulmonary emphysema 10/28/2023    SAH (subarachnoid hemorrhage) 2016, s/p clipping    Subarachnoid hemorrhage due to ruptured aneurysm        Social History     Socioeconomic History    Marital status:    Occupational History    Occupation: Retired   Tobacco Use    Smoking status: Former     Current packs/day: 0.00     Average packs/day: 0.5 packs/day for 20.0 years (10.0 ttl pk-yrs)     Types: Cigarettes     Start date: 1979     Quit date: 1999     Years since quittin.3     Passive exposure: Past    Smokeless tobacco: Never   Substance and Sexual Activity    Alcohol use: Yes     Alcohol/week: 7.0 standard drinks of alcohol     Types: 7 Glasses of wine per week     Comment: One glass of Red wine prior to dinner    Drug use: No    Sexual activity: Yes     Partners: Male   Social History Narrative    .  Has 3 grown daughters.       Social Determinants of Health     Financial Resource Strain: Low Risk  (2023)    Overall Financial Resource Strain (CARDIA)     Difficulty of Paying Living Expenses: Not hard at all   Food Insecurity: No Food Insecurity (2023)    Hunger Vital Sign     Worried About Running Out of Food in the Last Year: Never true     Ran Out of Food in the Last Year: Never true   Transportation Needs: No Transportation Needs (2023)    PRAPARE - Transportation     Lack of Transportation (Medical): No     Lack of Transportation (Non-Medical): No   Physical Activity: Insufficiently  Active (11/27/2023)    Exercise Vital Sign     Days of Exercise per Week: 1 day     Minutes of Exercise per Session: 30 min   Stress: No Stress Concern Present (11/27/2023)    [employer] - Occupational Stress Questionnaire     Feeling of Stress : Not at all   Recent Concern: Stress - Stress Concern Present (10/28/2023)    [employer] - Occupational Stress Questionnaire     Feeling of Stress : To some extent   Housing Stability: Low Risk  (11/27/2023)    Housing Stability Vital Sign     Unable to Pay for Housing in the Last Year: No     Number of Places Lived in the Last Year: 1     Unstable Housing in the Last Year: No       Precautions:     Allergies as of 05/14/2024    (No Known Allergies)       [facility] Assessment Details/Treatment   Patient seen for wound care consultation.   Reviewed chart for this encounter.   See Flow Sheet for findings.    Pt in bed and agreeable to care. Pt positioned herself to the left side for assessment. Pt cleansed due to oozing stool/blood from rectum. Bedside RN notified. Partial thickness skin loss to the sacrum with a pink and moist wound bed. The wound is likely due to incontinence, moisture and friction. The periwound is intact and moist. Triad applied. Pt educated on triad use and the importance of turning every 2 hours.     RECOMMENDATIONS:  - Sacrum and buttocks: Bedside nursing to cleanse with soap and water, pat dry and apply triad BID and PRN; no diapers nor dressings   - Turning every 2 hours  - Bedside nursing to maintain pressure injury prevention interventions     05/15/24 1133        Wound 05/15/24 0020 Moisture associated dermatitis Sacral spine #1   Date First Assessed/Time First Assessed: 05/15/24 0020   Present on Original Admission: Yes  Primary Wound Type: Moisture associated dermatitis  Location: Sacral spine  Wound Number: #1   Wound Image    Dressing Appearance Open to air   Drainage Amount None    Appearance Pink;Moist   Tissue loss description Partial thickness   Periwound Area Intact;Pink;Moist   Care Cleansed with:;Soap and water   Dressing Applied;Other (comment)  (triad)   Periwound Care Moisture barrier applied     Recommendations made to primary team for above plan via secure chat. Wound care will follow-up as needed.   05/15/2024

## 2024-05-16 ENCOUNTER — ANESTHESIA (OUTPATIENT)
Dept: ENDOSCOPY | Facility: HOSPITAL | Age: 74
DRG: 329 | End: 2024-05-16
Payer: MEDICARE

## 2024-05-16 ENCOUNTER — ANESTHESIA EVENT (OUTPATIENT)
Dept: ENDOSCOPY | Facility: HOSPITAL | Age: 74
DRG: 329 | End: 2024-05-16
Payer: MEDICARE

## 2024-05-16 LAB
ALBUMIN SERPL BCP-MCNC: 1.9 G/DL (ref 3.5–5.2)
ALBUMIN SERPL BCP-MCNC: 2 G/DL (ref 3.5–5.2)
ALP SERPL-CCNC: 55 U/L (ref 55–135)
ALP SERPL-CCNC: 75 U/L (ref 55–135)
ALT SERPL W/O P-5'-P-CCNC: 12 U/L (ref 10–44)
ALT SERPL W/O P-5'-P-CCNC: 20 U/L (ref 10–44)
ANION GAP SERPL CALC-SCNC: 12 MMOL/L (ref 8–16)
ANION GAP SERPL CALC-SCNC: 13 MMOL/L (ref 8–16)
ANISOCYTOSIS BLD QL SMEAR: SLIGHT
AST SERPL-CCNC: 25 U/L (ref 10–40)
AST SERPL-CCNC: 54 U/L (ref 10–40)
BASO STIPL BLD QL SMEAR: ABNORMAL
BASOPHILS # BLD AUTO: 0.02 K/UL (ref 0–0.2)
BASOPHILS # BLD AUTO: 0.03 K/UL (ref 0–0.2)
BASOPHILS # BLD AUTO: 0.04 K/UL (ref 0–0.2)
BASOPHILS NFR BLD: 0 % (ref 0–1.9)
BASOPHILS NFR BLD: 0.1 % (ref 0–1.9)
BASOPHILS NFR BLD: 0.1 % (ref 0–1.9)
BILIRUB SERPL-MCNC: 0.9 MG/DL (ref 0.1–1)
BILIRUB SERPL-MCNC: 1.5 MG/DL (ref 0.1–1)
BLD PROD TYP BPU: NORMAL
BLOOD UNIT EXPIRATION DATE: NORMAL
BLOOD UNIT TYPE CODE: 6200
BLOOD UNIT TYPE: NORMAL
BUN SERPL-MCNC: 25 MG/DL (ref 8–23)
BUN SERPL-MCNC: 25 MG/DL (ref 8–23)
BURR CELLS BLD QL SMEAR: ABNORMAL
CALCIUM SERPL-MCNC: 7.2 MG/DL (ref 8.7–10.5)
CALCIUM SERPL-MCNC: 7.3 MG/DL (ref 8.7–10.5)
CHLORIDE SERPL-SCNC: 110 MMOL/L (ref 95–110)
CHLORIDE SERPL-SCNC: 110 MMOL/L (ref 95–110)
CO2 SERPL-SCNC: 22 MMOL/L (ref 23–29)
CO2 SERPL-SCNC: 23 MMOL/L (ref 23–29)
CODING SYSTEM: NORMAL
CREAT SERPL-MCNC: 0.9 MG/DL (ref 0.5–1.4)
CREAT SERPL-MCNC: 0.9 MG/DL (ref 0.5–1.4)
CROSSMATCH INTERPRETATION: NORMAL
DACRYOCYTES BLD QL SMEAR: ABNORMAL
DACRYOCYTES BLD QL SMEAR: ABNORMAL
DIFFERENTIAL METHOD BLD: ABNORMAL
DISPENSE STATUS: NORMAL
DOHLE BOD BLD QL SMEAR: PRESENT
DOHLE BOD BLD QL SMEAR: PRESENT
EOSINOPHIL # BLD AUTO: 0 K/UL (ref 0–0.5)
EOSINOPHIL NFR BLD: 0 % (ref 0–8)
ERYTHROCYTE [DISTWIDTH] IN BLOOD BY AUTOMATED COUNT: 15.7 % (ref 11.5–14.5)
ERYTHROCYTE [DISTWIDTH] IN BLOOD BY AUTOMATED COUNT: 15.8 % (ref 11.5–14.5)
ERYTHROCYTE [DISTWIDTH] IN BLOOD BY AUTOMATED COUNT: 16 % (ref 11.5–14.5)
EST. GFR  (NO RACE VARIABLE): >60 ML/MIN/1.73 M^2
EST. GFR  (NO RACE VARIABLE): >60 ML/MIN/1.73 M^2
GIANT PLATELETS BLD QL SMEAR: PRESENT
GLUCOSE SERPL-MCNC: 127 MG/DL (ref 70–110)
GLUCOSE SERPL-MCNC: 148 MG/DL (ref 70–110)
HCT VFR BLD AUTO: 16.4 % (ref 37–48.5)
HCT VFR BLD AUTO: 21.7 % (ref 37–48.5)
HCT VFR BLD AUTO: 24.6 % (ref 37–48.5)
HGB BLD-MCNC: 5.8 G/DL (ref 12–16)
HGB BLD-MCNC: 7.8 G/DL (ref 12–16)
HGB BLD-MCNC: 8.4 G/DL (ref 12–16)
HYPOCHROMIA BLD QL SMEAR: ABNORMAL
HYPOCHROMIA BLD QL SMEAR: ABNORMAL
IMM GRANULOCYTES # BLD AUTO: 1.65 K/UL (ref 0–0.04)
IMM GRANULOCYTES # BLD AUTO: 1.66 K/UL (ref 0–0.04)
IMM GRANULOCYTES # BLD AUTO: 1.68 K/UL (ref 0–0.04)
IMM GRANULOCYTES NFR BLD AUTO: 3.7 % (ref 0–0.5)
IMM GRANULOCYTES NFR BLD AUTO: 3.8 % (ref 0–0.5)
IMM GRANULOCYTES NFR BLD AUTO: 4.4 % (ref 0–0.5)
INR PPP: 1 (ref 0.8–1.2)
LACTATE SERPL-SCNC: 1.1 MMOL/L (ref 0.5–2.2)
LACTATE SERPL-SCNC: 2.5 MMOL/L (ref 0.5–2.2)
LYMPHOCYTES # BLD AUTO: 0.7 K/UL (ref 1–4.8)
LYMPHOCYTES # BLD AUTO: 1.4 K/UL (ref 1–4.8)
LYMPHOCYTES # BLD AUTO: 1.5 K/UL (ref 1–4.8)
LYMPHOCYTES NFR BLD: 1.9 % (ref 18–48)
LYMPHOCYTES NFR BLD: 3.1 % (ref 18–48)
LYMPHOCYTES NFR BLD: 3.3 % (ref 18–48)
MAGNESIUM SERPL-MCNC: 2.1 MG/DL (ref 1.6–2.6)
MCH RBC QN AUTO: 30.5 PG (ref 27–31)
MCH RBC QN AUTO: 31.5 PG (ref 27–31)
MCH RBC QN AUTO: 31.7 PG (ref 27–31)
MCHC RBC AUTO-ENTMCNC: 34.1 G/DL (ref 32–36)
MCHC RBC AUTO-ENTMCNC: 35.4 G/DL (ref 32–36)
MCHC RBC AUTO-ENTMCNC: 35.9 G/DL (ref 32–36)
MCV RBC AUTO: 88 FL (ref 82–98)
MCV RBC AUTO: 89 FL (ref 82–98)
MCV RBC AUTO: 90 FL (ref 82–98)
MONOCYTES # BLD AUTO: 5.9 K/UL (ref 0.3–1)
MONOCYTES # BLD AUTO: 7.6 K/UL (ref 0.3–1)
MONOCYTES # BLD AUTO: 7.9 K/UL (ref 0.3–1)
MONOCYTES NFR BLD: 15.9 % (ref 4–15)
MONOCYTES NFR BLD: 16.8 % (ref 4–15)
MONOCYTES NFR BLD: 18.5 % (ref 4–15)
NEUTROPHILS # BLD AUTO: 29 K/UL (ref 1.8–7.7)
NEUTROPHILS # BLD AUTO: 31.9 K/UL (ref 1.8–7.7)
NEUTROPHILS # BLD AUTO: 34.3 K/UL (ref 1.8–7.7)
NEUTROPHILS NFR BLD: 74.6 % (ref 38–73)
NEUTROPHILS NFR BLD: 76.1 % (ref 38–73)
NEUTROPHILS NFR BLD: 77.7 % (ref 38–73)
NRBC BLD-RTO: 2 /100 WBC
NUM UNITS TRANS PACKED RBC: NORMAL
OVALOCYTES BLD QL SMEAR: ABNORMAL
PAPPENHEIMER BOD BLD QL SMEAR: PRESENT
PHOSPHATE SERPL-MCNC: 3.6 MG/DL (ref 2.7–4.5)
PLATELET # BLD AUTO: 172 K/UL (ref 150–450)
PLATELET # BLD AUTO: 219 K/UL (ref 150–450)
PLATELET # BLD AUTO: 220 K/UL (ref 150–450)
PLATELET BLD QL SMEAR: ABNORMAL
PLATELET BLD QL SMEAR: ABNORMAL
PMV BLD AUTO: 10.1 FL (ref 9.2–12.9)
PMV BLD AUTO: 10.1 FL (ref 9.2–12.9)
PMV BLD AUTO: 10.5 FL (ref 9.2–12.9)
POIKILOCYTOSIS BLD QL SMEAR: SLIGHT
POLYCHROMASIA BLD QL SMEAR: ABNORMAL
POTASSIUM SERPL-SCNC: 2.6 MMOL/L (ref 3.5–5.1)
POTASSIUM SERPL-SCNC: 3.3 MMOL/L (ref 3.5–5.1)
PROT SERPL-MCNC: 4.3 G/DL (ref 6–8.4)
PROT SERPL-MCNC: 4.3 G/DL (ref 6–8.4)
PROTHROMBIN TIME: 11.1 SEC (ref 9–12.5)
RBC # BLD AUTO: 1.84 M/UL (ref 4–5.4)
RBC # BLD AUTO: 2.46 M/UL (ref 4–5.4)
RBC # BLD AUTO: 2.75 M/UL (ref 4–5.4)
SCHISTOCYTES BLD QL SMEAR: ABNORMAL
SCHISTOCYTES BLD QL SMEAR: ABNORMAL
SCHISTOCYTES BLD QL SMEAR: PRESENT
SCHISTOCYTES BLD QL SMEAR: PRESENT
SODIUM SERPL-SCNC: 144 MMOL/L (ref 136–145)
SODIUM SERPL-SCNC: 146 MMOL/L (ref 136–145)
SPHEROCYTES BLD QL SMEAR: ABNORMAL
SPHEROCYTES BLD QL SMEAR: ABNORMAL
TARGETS BLD QL SMEAR: ABNORMAL
TOXIC GRANULES BLD QL SMEAR: PRESENT
TOXIC GRANULES BLD QL SMEAR: PRESENT
UNIT NUMBER: NORMAL
WBC # BLD AUTO: 37.33 K/UL (ref 3.9–12.7)
WBC # BLD AUTO: 42.87 K/UL (ref 3.9–12.7)
WBC # BLD AUTO: 45.08 K/UL (ref 3.9–12.7)
WBC TOXIC VACUOLES BLD QL SMEAR: PRESENT

## 2024-05-16 PROCEDURE — 3E1H88Z IRRIGATION OF LOWER GI USING IRRIGATING SUBSTANCE, VIA NATURAL OR ARTIFICIAL OPENING ENDOSCOPIC: ICD-10-PCS | Performed by: INTERNAL MEDICINE

## 2024-05-16 PROCEDURE — 85025 COMPLETE CBC W/AUTO DIFF WBC: CPT | Mod: 91,NTX

## 2024-05-16 PROCEDURE — 99233 SBSQ HOSP IP/OBS HIGH 50: CPT | Mod: GC,NTX,, | Performed by: INTERNAL MEDICINE

## 2024-05-16 PROCEDURE — D9220A PRA ANESTHESIA: Mod: ANES,NTX,, | Performed by: STUDENT IN AN ORGANIZED HEALTH CARE EDUCATION/TRAINING PROGRAM

## 2024-05-16 PROCEDURE — 84100 ASSAY OF PHOSPHORUS: CPT | Mod: NTX

## 2024-05-16 PROCEDURE — 86920 COMPATIBILITY TEST SPIN: CPT | Mod: NTX

## 2024-05-16 PROCEDURE — 25000003 PHARM REV CODE 250: Mod: NTX

## 2024-05-16 PROCEDURE — 63600175 PHARM REV CODE 636 W HCPCS: Mod: NTX

## 2024-05-16 PROCEDURE — P9035 PLATELET PHERES LEUKOREDUCED: HCPCS | Mod: NTX

## 2024-05-16 PROCEDURE — 45378 DIAGNOSTIC COLONOSCOPY: CPT | Mod: NTX | Performed by: INTERNAL MEDICINE

## 2024-05-16 PROCEDURE — 85025 COMPLETE CBC W/AUTO DIFF WBC: CPT | Mod: 91,NTX | Performed by: INTERNAL MEDICINE

## 2024-05-16 PROCEDURE — 36430 TRANSFUSION BLD/BLD COMPNT: CPT | Mod: NTX

## 2024-05-16 PROCEDURE — 25000003 PHARM REV CODE 250: Mod: NTX | Performed by: NURSE ANESTHETIST, CERTIFIED REGISTERED

## 2024-05-16 PROCEDURE — 37000009 HC ANESTHESIA EA ADD 15 MINS: Mod: NTX | Performed by: INTERNAL MEDICINE

## 2024-05-16 PROCEDURE — 25000242 PHARM REV CODE 250 ALT 637 W/ HCPCS: Mod: NTX

## 2024-05-16 PROCEDURE — 94640 AIRWAY INHALATION TREATMENT: CPT | Mod: NTX

## 2024-05-16 PROCEDURE — 83605 ASSAY OF LACTIC ACID: CPT | Mod: 91,NTX

## 2024-05-16 PROCEDURE — 80053 COMPREHEN METABOLIC PANEL: CPT | Mod: NTX

## 2024-05-16 PROCEDURE — 30233R1 TRANSFUSION OF NONAUTOLOGOUS PLATELETS INTO PERIPHERAL VEIN, PERCUTANEOUS APPROACH: ICD-10-PCS | Performed by: INTERNAL MEDICINE

## 2024-05-16 PROCEDURE — 99900035 HC TECH TIME PER 15 MIN (STAT): Mod: NTX

## 2024-05-16 PROCEDURE — 87040 BLOOD CULTURE FOR BACTERIA: CPT | Mod: NTX

## 2024-05-16 PROCEDURE — 45378 DIAGNOSTIC COLONOSCOPY: CPT | Mod: 22,53,NTX, | Performed by: INTERNAL MEDICINE

## 2024-05-16 PROCEDURE — D9220A PRA ANESTHESIA: Mod: CRNA,NTX,, | Performed by: NURSE ANESTHETIST, CERTIFIED REGISTERED

## 2024-05-16 PROCEDURE — P9016 RBC LEUKOCYTES REDUCED: HCPCS | Mod: NTX

## 2024-05-16 PROCEDURE — 80053 COMPREHEN METABOLIC PANEL: CPT | Mod: 91,NTX

## 2024-05-16 PROCEDURE — 27000221 HC OXYGEN, UP TO 24 HOURS: Mod: NTX

## 2024-05-16 PROCEDURE — 83735 ASSAY OF MAGNESIUM: CPT | Mod: NTX

## 2024-05-16 PROCEDURE — 94761 N-INVAS EAR/PLS OXIMETRY MLT: CPT | Mod: NTX

## 2024-05-16 PROCEDURE — 63600175 PHARM REV CODE 636 W HCPCS: Mod: NTX | Performed by: NURSE ANESTHETIST, CERTIFIED REGISTERED

## 2024-05-16 PROCEDURE — 37000008 HC ANESTHESIA 1ST 15 MINUTES: Mod: NTX | Performed by: INTERNAL MEDICINE

## 2024-05-16 PROCEDURE — C9113 INJ PANTOPRAZOLE SODIUM, VIA: HCPCS | Mod: NTX

## 2024-05-16 PROCEDURE — 85610 PROTHROMBIN TIME: CPT | Mod: NTX

## 2024-05-16 PROCEDURE — 83605 ASSAY OF LACTIC ACID: CPT | Mod: NTX | Performed by: INTERNAL MEDICINE

## 2024-05-16 PROCEDURE — 20000000 HC ICU ROOM: Mod: NTX

## 2024-05-16 RX ORDER — MORPHINE SULFATE 2 MG/ML
2 INJECTION, SOLUTION INTRAMUSCULAR; INTRAVENOUS EVERY 6 HOURS PRN
Status: DISCONTINUED | OUTPATIENT
Start: 2024-05-16 | End: 2024-05-16

## 2024-05-16 RX ORDER — LORAZEPAM 2 MG/ML
0.5 INJECTION INTRAMUSCULAR ONCE
Status: COMPLETED | OUTPATIENT
Start: 2024-05-16 | End: 2024-05-16

## 2024-05-16 RX ORDER — PHENYLEPHRINE HYDROCHLORIDE 10 MG/ML
INJECTION INTRAVENOUS
Status: DISCONTINUED | OUTPATIENT
Start: 2024-05-16 | End: 2024-05-16

## 2024-05-16 RX ORDER — PANTOPRAZOLE SODIUM 40 MG/10ML
40 INJECTION, POWDER, LYOPHILIZED, FOR SOLUTION INTRAVENOUS DAILY
Status: CANCELLED | OUTPATIENT
Start: 2024-05-16

## 2024-05-16 RX ORDER — POTASSIUM CHLORIDE 7.45 MG/ML
10 INJECTION INTRAVENOUS
Status: COMPLETED | OUTPATIENT
Start: 2024-05-16 | End: 2024-05-16

## 2024-05-16 RX ORDER — LORAZEPAM 2 MG/ML
1 INJECTION INTRAMUSCULAR EVERY 4 HOURS PRN
Status: DISCONTINUED | OUTPATIENT
Start: 2024-05-16 | End: 2024-05-17

## 2024-05-16 RX ORDER — IPRATROPIUM BROMIDE AND ALBUTEROL SULFATE 2.5; .5 MG/3ML; MG/3ML
3 SOLUTION RESPIRATORY (INHALATION) ONCE
Status: COMPLETED | OUTPATIENT
Start: 2024-05-16 | End: 2024-05-16

## 2024-05-16 RX ORDER — MORPHINE SULFATE 2 MG/ML
INJECTION, SOLUTION INTRAMUSCULAR; INTRAVENOUS
Status: COMPLETED
Start: 2024-05-16 | End: 2024-05-16

## 2024-05-16 RX ORDER — PANTOPRAZOLE SODIUM 40 MG/10ML
40 INJECTION, POWDER, LYOPHILIZED, FOR SOLUTION INTRAVENOUS DAILY
Status: DISCONTINUED | OUTPATIENT
Start: 2024-05-17 | End: 2024-05-21

## 2024-05-16 RX ORDER — MORPHINE SULFATE 2 MG/ML
1 INJECTION, SOLUTION INTRAMUSCULAR; INTRAVENOUS EVERY 6 HOURS PRN
Status: DISCONTINUED | OUTPATIENT
Start: 2024-05-16 | End: 2024-05-16

## 2024-05-16 RX ORDER — PROPOFOL 10 MG/ML
VIAL (ML) INTRAVENOUS
Status: DISCONTINUED | OUTPATIENT
Start: 2024-05-16 | End: 2024-05-16

## 2024-05-16 RX ORDER — MORPHINE SULFATE 2 MG/ML
2 INJECTION, SOLUTION INTRAMUSCULAR; INTRAVENOUS EVERY 6 HOURS PRN
Status: DISCONTINUED | OUTPATIENT
Start: 2024-05-16 | End: 2024-05-17

## 2024-05-16 RX ORDER — OXYCODONE HYDROCHLORIDE 5 MG/1
5 TABLET ORAL EVERY 4 HOURS PRN
Status: DISCONTINUED | OUTPATIENT
Start: 2024-05-16 | End: 2024-05-17

## 2024-05-16 RX ADMIN — POTASSIUM BICARBONATE 40 MEQ: 391 TABLET, EFFERVESCENT ORAL at 05:05

## 2024-05-16 RX ADMIN — POTASSIUM CHLORIDE 10 MEQ: 7.46 INJECTION, SOLUTION INTRAVENOUS at 05:05

## 2024-05-16 RX ADMIN — QUETIAPINE FUMARATE 50 MG: 25 TABLET ORAL at 09:05

## 2024-05-16 RX ADMIN — OXYCODONE 5 MG: 5 TABLET ORAL at 10:05

## 2024-05-16 RX ADMIN — POTASSIUM CHLORIDE 10 MEQ: 7.46 INJECTION, SOLUTION INTRAVENOUS at 07:05

## 2024-05-16 RX ADMIN — TIOTROPIUM BROMIDE INHALATION SPRAY 2 PUFF: 3.12 SPRAY, METERED RESPIRATORY (INHALATION) at 09:05

## 2024-05-16 RX ADMIN — POTASSIUM CHLORIDE 10 MEQ: 7.46 INJECTION, SOLUTION INTRAVENOUS at 06:05

## 2024-05-16 RX ADMIN — PROPOFOL 40 MG: 10 INJECTION, EMULSION INTRAVENOUS at 03:05

## 2024-05-16 RX ADMIN — IPRATROPIUM BROMIDE AND ALBUTEROL SULFATE 3 ML: 2.5; .5 SOLUTION RESPIRATORY (INHALATION) at 09:05

## 2024-05-16 RX ADMIN — POTASSIUM CHLORIDE 10 MEQ: 7.46 INJECTION, SOLUTION INTRAVENOUS at 10:05

## 2024-05-16 RX ADMIN — PANTOPRAZOLE SODIUM 40 MG: 40 INJECTION, POWDER, FOR SOLUTION INTRAVENOUS at 10:05

## 2024-05-16 RX ADMIN — PHENYLEPHRINE HYDROCHLORIDE 200 MCG: 10 INJECTION INTRAVENOUS at 04:05

## 2024-05-16 RX ADMIN — SODIUM CHLORIDE: 9 INJECTION, SOLUTION INTRAVENOUS at 04:05

## 2024-05-16 RX ADMIN — LORAZEPAM 1 MG: 2 INJECTION INTRAMUSCULAR; INTRAVENOUS at 10:05

## 2024-05-16 RX ADMIN — MORPHINE SULFATE 2 MG: 2 INJECTION, SOLUTION INTRAMUSCULAR; INTRAVENOUS at 05:05

## 2024-05-16 RX ADMIN — POTASSIUM CHLORIDE 10 MEQ: 7.46 INJECTION, SOLUTION INTRAVENOUS at 12:05

## 2024-05-16 RX ADMIN — FLUTICASONE FUROATE AND VILANTEROL TRIFENATATE 1 PUFF: 100; 25 POWDER RESPIRATORY (INHALATION) at 09:05

## 2024-05-16 RX ADMIN — PHENYLEPHRINE HYDROCHLORIDE 150 MCG: 10 INJECTION INTRAVENOUS at 03:05

## 2024-05-16 RX ADMIN — SODIUM CHLORIDE: 9 INJECTION, SOLUTION INTRAVENOUS at 03:05

## 2024-05-16 RX ADMIN — PHENYLEPHRINE HYDROCHLORIDE 100 MCG: 10 INJECTION INTRAVENOUS at 03:05

## 2024-05-16 RX ADMIN — LORAZEPAM 0.5 MG: 2 INJECTION INTRAMUSCULAR; INTRAVENOUS at 01:05

## 2024-05-16 RX ADMIN — POTASSIUM CHLORIDE 10 MEQ: 7.46 INJECTION, SOLUTION INTRAVENOUS at 08:05

## 2024-05-16 RX ADMIN — LORAZEPAM 0.5 MG: 2 INJECTION INTRAMUSCULAR; INTRAVENOUS at 11:05

## 2024-05-16 RX ADMIN — POTASSIUM BICARBONATE 40 MEQ: 391 TABLET, EFFERVESCENT ORAL at 09:05

## 2024-05-16 RX ADMIN — MORPHINE SULFATE 2 MG: 2 INJECTION, SOLUTION INTRAMUSCULAR; INTRAVENOUS at 11:05

## 2024-05-16 NOTE — SUBJECTIVE & OBJECTIVE
Interval History/Significant Events: Patient extremely anxious today, pending colonoscopy per GI. CTA overnight with no signs of acute bleed but concern for possible ileus. Patient with multiple bloody bowel movements overnight s/p 1U pRBC.     Review of Systems   Constitutional:  Negative for chills and fever.   HENT:  Negative for nosebleeds, rhinorrhea, sore throat and trouble swallowing.    Eyes:  Negative for visual disturbance.   Respiratory:  Positive for shortness of breath. Negative for cough and wheezing.    Cardiovascular:  Negative for chest pain and leg swelling.   Gastrointestinal:  Positive for blood in stool and diarrhea. Negative for abdominal distention, abdominal pain, nausea and vomiting.   Endocrine: Positive for cold intolerance.   Genitourinary:  Negative for dysuria, hematuria and pelvic pain.   Musculoskeletal:  Negative for back pain.   Skin:  Positive for pallor.   Neurological:  Positive for syncope, weakness and light-headedness.   Psychiatric/Behavioral:  Negative for confusion and decreased concentration.      Objective:     Vital Signs (Most Recent):  Temp: 97.4 °F (36.3 °C) (05/16/24 0800)  Pulse: 106 (05/16/24 1100)  Resp: (!) 30 (05/16/24 1145)  BP: 128/65 (05/16/24 1100)  SpO2: 97 % (05/16/24 1100) Vital Signs (24h Range):  Temp:  [97.4 °F (36.3 °C)-100.2 °F (37.9 °C)] 97.4 °F (36.3 °C)  Pulse:  [] 106  Resp:  [16-39] 30  SpO2:  [82 %-100 %] 97 %  BP: ()/(52-94) 128/65   Weight: 52.6 kg (115 lb 15.4 oz)  Body mass index is 20.54 kg/m².      Intake/Output Summary (Last 24 hours) at 5/16/2024 1220  Last data filed at 5/16/2024 1100  Gross per 24 hour   Intake 3268.99 ml   Output 2500 ml   Net 768.99 ml          Physical Exam  Vitals and nursing note reviewed.   Constitutional:       General: She is not in acute distress.     Comments: Anxious   HENT:      Head: Normocephalic and atraumatic.      Mouth/Throat:      Mouth: Mucous membranes are dry.      Pharynx: Oropharynx  is clear.   Eyes:      General: No scleral icterus.     Extraocular Movements: Extraocular movements intact.      Conjunctiva/sclera: Conjunctivae normal.   Cardiovascular:      Rate and Rhythm: Normal rate and regular rhythm.      Heart sounds: Normal heart sounds. No murmur heard.  Pulmonary:      Effort: Pulmonary effort is normal. No respiratory distress.   Abdominal:      General: Abdomen is flat. There is no distension.      Palpations: Abdomen is soft.      Tenderness: There is abdominal tenderness. There is no guarding or rebound.   Musculoskeletal:         General: No tenderness.      Cervical back: Normal range of motion and neck supple.      Right lower leg: No edema.      Left lower leg: No edema.   Skin:     General: Skin is warm and dry.      Coloration: Skin is pale. Skin is not jaundiced.   Neurological:      General: No focal deficit present.      Mental Status: She is alert and oriented to person, place, and time. Mental status is at baseline.            Vents:  Oxygen Concentration (%): 32 (05/15/24 1954)  Lines/Drains/Airways       Drain  Duration                  Fecal Incontinence  05/16/24 0300 <1 day         Urethral Catheter 05/15/24 1600 <1 day              Peripheral Intravenous Line  Duration                  Peripheral IV - Single Lumen 05/14/24 2100 20 G Anterior;Right Forearm 1 day         Peripheral IV - Single Lumen 05/16/24 0300 20 G Posterior;Right Wrist <1 day                  Significant Labs:    CBC/Anemia Profile:  Recent Labs   Lab 05/15/24  1832 05/15/24  2300 05/15/24  2302 05/16/24  0554   WBC 46.87*  --  39.72* 45.08*   HGB 6.6*  --  7.1* 8.4*   HCT 20.7* 21* 21.3* 24.6*     --  228 219   MCV 97  --  90 90   RDW 17.0*  --  16.2* 15.7*        Chemistries:  Recent Labs   Lab 05/14/24  1256 05/15/24  0409 05/16/24  0408   * 138 146*   K 3.1* 3.0* 2.6*   CL 98 109 110   CO2 21* 19* 23   BUN 27* 21 25*   CREATININE 1.2 0.8 0.9   CALCIUM 8.1* 7.7* 7.3*    ALBUMIN 2.7* 2.1* 2.0*   PROT 6.2 4.6* 4.3*   BILITOT 0.6 1.0 1.5*   ALKPHOS 70 55 55   ALT 19 15 12   AST 32 26 25   MG 1.1* 1.6 2.1   PHOS  --  3.8 3.6       CMP:   Recent Labs   Lab 05/14/24  1256 05/15/24  0409 05/16/24  0408   * 138 146*   K 3.1* 3.0* 2.6*   CL 98 109 110   CO2 21* 19* 23   * 131* 148*   BUN 27* 21 25*   CREATININE 1.2 0.8 0.9   CALCIUM 8.1* 7.7* 7.3*   PROT 6.2 4.6* 4.3*   ALBUMIN 2.7* 2.1* 2.0*   BILITOT 0.6 1.0 1.5*   ALKPHOS 70 55 55   AST 32 26 25   ALT 19 15 12   ANIONGAP 16 10 13       Significant Imaging:  I have reviewed all pertinent imaging results/findings within the past 24 hours.

## 2024-05-16 NOTE — ANESTHESIA PREPROCEDURE EVALUATION
[facility]  Anesthesia Pre-Operative Evaluation         Patient Name: Jessica Floyd  YOB: 1950  MRN: [id number]    SUBJECTIVE:     Pre-operative evaluation for Procedure(s) (LRB):  COLONOSCOPY (N/A)     05/16/2024    Jessica Floyd is a 73 y.o. female w/ a significant PMHx of  of  COPD on home 2L NC, pulm HTN, R carotid stent on ASA, HFpEF, anemia, and ERIK on infusions who presented [place] [facility] [place] with the melena, now s/p IR embolization on 5/14/24.      Patient now presents for the above procedure(s).        TTE 10/29/2023:    Left Ventricle: The left ventricle is normal in size. Normal wall thickness. Normal wall motion. There is normal systolic function with a visually estimated ejection fraction of 60 - 65%. There is normal diastolic function.    Right Ventricle: Mild right ventricular enlargement. Wall thickness is normal. Right ventricle wall motion  is normal. Systolic function is normal.    Aortic Valve: The aortic valve is a unicuspid valve. There is mild aortic valve sclerosis. There is trace aortic regurgitation.    Mitral Valve: There is mild regurgitation.    Pulmonary Artery: There is mild pulmonary hypertension. The estimated pulmonary artery systolic pressure is 40 mmHg.    IVC/SVC: Intermediate venous pressure at 8 mmHg.      LDA:        Peripheral IV - Single Lumen 05/14/24 1219 20 G Left Antecubital (Active)   Site Assessment Clean;Dry;Intact;No swelling;No redness 05/16/24 0301   Extremity Assessment Distal to IV No abnormal discoloration;No redness;No swelling;No warmth 05/16/24 0301   Line Status Flushed;Saline locked 05/16/24 0301   Dressing Status Clean;Dry;Intact 05/16/24 0301   Dressing Intervention Integrity maintained 05/16/24 0301   Dressing Change Due 05/18/24 05/16/24 0301   Site Change Due 05/18/24 05/16/24 0301   Reason Not Rotated Not due 05/16/24 0301   Number of days: 1            Peripheral IV - Single Lumen 05/14/24 2100 18 G Anterior;Left;Medial  "Antecubital (Active)   Site Assessment Clean;Dry;Intact;No redness;No swelling 05/16/24 0301   Extremity Assessment Distal to IV No abnormal discoloration;No redness;No swelling;No warmth 05/16/24 0301   Line Status Flushed;Saline locked 05/16/24 0301   Dressing Status Clean;Dry;Intact 05/16/24 0301   Dressing Intervention Integrity maintained 05/16/24 0301   Dressing Change Due 05/18/24 05/16/24 0301   Site Change Due 05/18/24 05/16/24 0301   Reason Not Rotated Not due 05/16/24 0301   Number of days: 1            Peripheral IV - Single Lumen 05/14/24 2100 20 G Anterior;Right Forearm (Active)   Site Assessment Clean;Dry;Intact;No swelling;No redness 05/16/24 0301   Extremity Assessment Distal to IV No abnormal discoloration;No redness;No swelling;No warmth 05/16/24 0301   Line Status Flushed;Saline locked 05/16/24 0301   Dressing Status Clean;Dry;Intact 05/16/24 0301   Dressing Intervention Integrity maintained 05/16/24 0301   Dressing Change Due 05/18/24 05/16/24 0301   Site Change Due 05/18/24 05/16/24 0301   Reason Not Rotated Not due 05/16/24 0301   Number of days: 1            Peripheral IV - Single Lumen 05/16/24 0300 20 G Posterior;Right Wrist (Active)   Number of days: 0            Urethral Catheter 05/15/24 1600 (Active)   $ Maldonado Insertion Bedside Insertion Performed 05/15/24 1700   Site Assessment Clean;Intact 05/16/24 0301   Collection Container Urimeter 05/16/24 0301   Securement Method secured to top of thigh w/ adhesive device 05/16/24 0301   Catheter Care Performed yes 05/16/24 0301   Reason for Continuing Urinary Catheterization Urinary retention 05/16/24 0301   CAUTI Prevention Bundle Securement Device in place with 1" slack;Intact seal between catheter & drainage tubing;Drainage bag/urimeter off the floor;No dependent loops or kinks;Sheeting clip in use;Drainage bag/urimeter not overfilled (<2/3 full);Drainage bag/urimeter below bladder 05/16/24 0301   Output (mL) 300 mL 05/16/24 0501   Number of " days: 0       Prev airway:   Induction:  Rapid sequence induction    Intubated:  Postinduction    Mask Ventilation:  Not attempted    Attempts:  1    Attempted By:  CRNA    Method of Intubation:  Video laryngoscopy    Blade:  Ibrahim 3    Laryngeal View Grade: Grade I - full view of cords      Difficult Airway Encountered?: No      Complications:  None    Airway Device:  Oral endotracheal tube    Airway Device Size:  7.5    Style/Cuff Inflation:  Cuffed (inflated to minimal occlusive pressure)    Tube secured:  24    Secured at:  The lips    Placement Verified By:  Capnometry    Complicating Factors:  None    Findings Post-Intubation:  BS equal bilateral and atraumatic/condition of teeth unchanged    Drips: None documented.    Patient Active Problem List   Diagnosis    Coronary artery disease involving native coronary artery of native heart without angina pectoris    Bilateral carotid artery stenosis    Gastroesophageal reflux disease without esophagitis    CKD stage G3a/A1, GFR 45-59 and albumin creatinine ratio <30 mg/g    Hypertension    Subclinical hyperthyroidism    Allergic rhinitis    Iron deficiency anemia secondary to blood loss (chronic)    History of fracture of left hip    Malnutrition of mild degree    Pathological fracture due to osteoporosis    Osteoporosis    Localized osteoporosis of [place]    Postnasal drip    Reduced vision-Left eye    Gout    Right rotator cuff tear arthropathy    SOB (shortness of breath)    Eosinophilia    Anxiety    PAC (premature atrial contraction)    Aortic atherosclerosis    COVID-19    Pulmonary emphysema    Weight loss    Moderate malnutrition    Influenza A    (HFpEF) heart failure with preserved ejection fraction    History Situational (ie Stress Induced) syncope ([facility] admission 12-25-23    Nicotine dependence, cigarettes, in FULL REMISSION:in past smoked for 17yrs from 16y to about 34y / LUPE:F pack a day    GI bleed       Review of patient's allergies  indicates:  No Known Allergies    Current Inpatient Medications:   allopurinoL  200 mg Oral Daily    dicyclomine  10 mg Oral QID    fluticasone furoate-vilanteroL  1 puff Inhalation Daily    pantoprazole  40 mg Intravenous BID    potassium bicarbonate  40 mEq Oral Q2H    potassium chloride  10 mEq Intravenous Q1H    QUEtiapine  50 mg Oral Nightly    tiotropium bromide  2 puff Inhalation Daily       Current Facility-Administered Medications on File Prior to Encounter   Medication Dose Route Frequency Provider Last Rate Last Admin    mupirocin 2 % ointment   Nasal On Call Procedure Kang Diaz MD   Given at 10/25/23 1045    tranexamic acid (CYKLOKAPRON) 1,000 mg in sodium chloride 0.9 % 100 mL IVPB (MB+)  1,000 mg Intravenous Once Kang Diaz MD        tranexamic acid (CYKLOKAPRON) 1,000 mg in sodium chloride 0.9 % 100 mL IVPB (MB+)  1,000 mg Intravenous Once Kang Diaz MD         Current Outpatient Medications on File Prior to Encounter   Medication Sig Dispense Refill    acetaminophen (TYLENOL) 650 MG TbSR Take 1 tablet (650 mg total) by mouth every 6 to 8 hours as needed (pain (mild-moderate)). 120 tablet 0    allopurinoL (ZYLOPRIM) 100 MG tablet Take 2 tablets (200 mg total) by mouth once daily. 180 tablet 3    amLODIPine (NORVASC) 5 MG tablet Take 1 tablet (5 mg total) by mouth once daily. 90 tablet 3    aspirin (ECOTRIN) 81 MG EC tablet Take 81 mg by mouth once daily.      atorvastatin (LIPITOR) 80 MG tablet TAKE 1 TABLET BY MOUTH EVERY DAY 90 tablet 3    bacitracin 500 unit/gram ointment Apply topically 3 (three) times daily. (Patient taking differently: Apply topically 3 (three) times daily as needed (Skin injuries).)  0    carvediloL (COREG) 6.25 MG tablet Take 1 tablet (6.25 mg total) by mouth 2 (two) times daily with meals. 180 tablet 3    celecoxib (CELEBREX) 200 MG capsule Take 1 capsule (200 mg total) by mouth daily as needed for Pain. 30 capsule 2    colchicine (COLCRYS) 0.6 mg tablet  "Take 1 tablet (0.6 mg total) by mouth once daily. As needed for gout 30 tablet 11    cyanocobalamin 1,000 mcg/mL injection Inject 1mL intramuscularly once weekly. 30 mL 0    fluticasone furoate-vilanteroL (BREO) 100-25 mcg/dose diskus inhaler Inhale 1 puff into the lungs once daily. Controller 30 each 11    fluticasone propionate (FLONASE) 50 mcg/actuation nasal spray SPRAY 2 pumps INTO EACH NOSTRIL ONCE DAILY 16 mL 3    furosemide (LASIX) 20 MG tablet Take 1 tablet (20 mg total) by mouth daily as needed (Leg swelling, SOB, Weight gain 3lb in 1 day or 5 lbs in 2 days.). 30 tablet 11    hydroCHLOROthiazide (HYDRODIURIL) 25 MG tablet Take 1 tablet (25 mg total) by mouth once daily. 90 tablet 3    montelukast (SINGULAIR) 10 mg tablet TAKE 1 TABLET BY MOUTH EVERY DAY IN THE EVENING 90 tablet 6    pantoprazole (PROTONIX) 40 MG tablet Take 1 tablet (40 mg total) by mouth once daily. 90 tablet 0    prenatal no122/iron/folic acid (PRENATAL MULTI ORAL) Take 1 tablet by mouth once daily.      QUEtiapine (SEROQUEL) 50 MG tablet Take 1 tablet (50 mg total) by mouth every evening. 90 tablet 1    sodium chloride (SALINE MIST NASL) Apply to each nostril daily for dryness      sodium chloride-aloe vera (SALINE NASAL, ALOE VERA,) Gel Apply to each nostril daily for dryness      tiotropium bromide (SPIRIVA RESPIMAT) 2.5 mcg/actuation inhaler INHALE 2 PUFFS INTO THE LUNGS ONCE DAILY. CONTROLLER 4 g 2    COMIRNATY 2023-24, 12Y UP,,PF, 30 mcg/0.3 mL inection       FLUAD QUAD 2023-24,65Y UP,,PF, 60 mcg (15 mcg x 4)/0.5 mL Syrg       needle, disp, 30 gauge 30 gauge x 1/2" Ndle Use a new needle once, use new needle daily for the first week, then a new needle once weekly for 8 weeks. 30 each 0    ORTHOVISC 30 mg/2 mL       syringe with needle (SYRINGE 3CC/22GX1") 3 mL 22 gauge x 1" Syrg 1 mL by Misc.(Non-Drug; Combo Route) route every 30 days. 15 each 0    VENOFER 100 mg iron/5 mL injection          Past Surgical History:   Procedure " Laterality Date    ADENOIDECTOMY      ANTERIOR CRUCIATE LIGAMENT REPAIR  2012    APPENDECTOMY      CATARACT EXTRACTION W/  INTRAOCULAR LENS IMPLANT Right 2022    Procedure: EXTRACTION, CATARACT, WITH IOL INSERTION;  Surgeon: Higinio Cristina MD;  Location: [facility] OR;  Service: Ophthalmology;  Laterality: Right;    CATARACT EXTRACTION W/  INTRAOCULAR LENS IMPLANT Left 2022    Procedure: EXTRACTION, CATARACT, WITH IOL INSERTION;  Surgeon: Higinio Cristina MD;  Location: [facility] OR;  Service: Ophthalmology;  Laterality: Left;    CEREBRAL ANEURYSM REPAIR      clip    DENTAL SURGERY      ESOPHAGOGASTRODUODENOSCOPY N/A 5/15/2024    Procedure: EGD (ESOPHAGOGASTRODUODENOSCOPY);  Surgeon: Rich Syed MD;  [facility]  Service: Endoscopy;  Laterality: N/A;    EYE SURGERY Bilateral     cataract removal and lens implants    HIP ARTHROPLASTY Left 2023    Procedure: TOTAL HIP ARTHROPLASTY;  Surgeon: Juan Wan MD;  [facility];  Service: Orthopedics;  Laterality: Left;    TONSILLECTOMY         Social History     Socioeconomic History    Marital status:    Occupational History    Occupation: Retired   Tobacco Use    Smoking status: Former     Current packs/day: 0.00     Average packs/day: 0.5 packs/day for 20.0 years (10.0 ttl pk-yrs)     Types: Cigarettes     Start date: 1979     Quit date: 1999     Years since quittin.3     Passive exposure: Past    Smokeless tobacco: Never   Substance and Sexual Activity    Alcohol use: Yes     Alcohol/week: 7.0 standard drinks of alcohol     Types: 7 Glasses of wine per week     Comment: One glass of Red wine prior to dinner    Drug use: No    Sexual activity: Yes     Partners: Male   Social History Narrative    .  Has 3 grown daughters.       Social Determinants of Health     Financial Resource Strain: Low Risk  (2023)    Overall Financial Resource Strain (CARDIA)     Difficulty of Paying Living  Expenses: Not hard at all   Food Insecurity: No Food Insecurity (11/27/2023)    Hunger Vital Sign     Worried About Running Out of Food in the Last Year: Never true     Ran Out of Food in the Last Year: Never true   Transportation Needs: No Transportation Needs (11/27/2023)    PRAPARE - Transportation     Lack of Transportation (Medical): No     Lack of Transportation (Non-Medical): No   Physical Activity: Insufficiently Active (11/27/2023)    Exercise Vital Sign     Days of Exercise per Week: 1 day     Minutes of Exercise per Session: 30 min   Stress: No Stress Concern Present (11/27/2023)    [facility] - Occupational Stress Questionnaire     Feeling of Stress : Not at all   Recent Concern: Stress - Stress Concern Present (10/28/2023)    [facility]ational [facility] - Occupational Stress Questionnaire     Feeling of Stress : To some extent   Housing Stability: Low Risk  (11/27/2023)    Housing Stability Vital Sign     Unable to Pay for Housing in the Last Year: No     Number of Places Lived in the Last Year: 1     Unstable Housing in the Last Year: No       OBJECTIVE:     Vital Signs Range (Last 24H):  Temp:  [36.8 °C (98.3 °F)-37.9 °C (100.2 °F)]   Pulse:  []   Resp:  [15-34]   BP: ()/(51-94)   SpO2:  [82 %-100 %]       Significant Labs:  Lab Results   Component Value Date    WBC 39.72 (H) 05/15/2024    HGB 7.1 (L) 05/15/2024    HCT 21.3 (L) 05/15/2024     05/15/2024    CHOL 176 04/05/2022    TRIG 88 04/05/2022    HDL 69 04/05/2022    ALT 12 05/16/2024    AST 25 05/16/2024     (H) 05/16/2024    K 2.6 (LL) 05/16/2024     05/16/2024    CREATININE 0.9 05/16/2024    BUN 25 (H) 05/16/2024    CO2 23 05/16/2024    TSH 0.719 10/28/2023    INR 1.0 05/16/2024    HGBA1C 5.2 11/18/2023       Diagnostic Studies: No relevant studies.    EKG:   Results for orders placed or performed during the hospital encounter of 05/14/24   EKG 12-lead    Collection Time:  05/14/24 12:36 PM   Result Value Ref Range    QRS Duration 80 ms    [facility] QTC Calculation 461 ms    Narrative    Test Reason : K92.1,    Vent. Rate : 102 BPM     Atrial Rate : 102 BPM     P-R Int : 134 ms          QRS Dur : 080 ms      QT Int : 354 ms       P-R-T Axes : 082 041 256 degrees     QTc Int : 461 ms    Sinus tachycardia with Premature supraventricular complexes  ST and T wave abnormality, consider inferolateral ischemia  Abnormal ECG  When compared with ECG of 27-DEC-2023 10:37,  T wave inversions now present in the inferolateral leads  Confirmed by Michelle Hyman MD ([id number]) on 5/14/2024 12:59:14 PM    Referred By: [email or web address]   SELF           Confirmed By:Michelle Hyman MD       2D ECHO:  TTE:  Results for orders placed or performed during the hospital encounter of 10/28/23   Echo   Result Value Ref Range    Ascending aorta 4.14 cm    STJ 2.98 cm    AV mean gradient 4 mmHg    Ao peak rah 1.33 m/s    Ao VTI 27.32 cm    IVS 0.79 0.6 - 1.1 cm    LA size 2.93 cm    Left Atrium Major Axis 3.88 cm    Left Atrium Minor Axis 4.39 cm    LVIDd 4.98 3.5 - 6.0 cm    LVIDs 3.18 2.1 - 4.0 cm    LVOT diameter 2.20 cm    LVOT peak VTI 17.59 cm    Posterior Wall 0.67 0.6 - 1.1 cm    MV Peak A Rah 0.79 m/s    E wave deceleration time 244.94 msec    MV Peak E Rah 0.55 m/s    RA Major Axis 4.35 cm    RA Width 4.45 cm    RVDD 3.60 cm    Sinus 3.69 cm    TAPSE 1.78 cm    TR Max Rah 2.84 m/s    LA WIDTH 4.23 cm    MV stenosis pressure 1/2 time 71.03 ms    LV Diastolic Volume 116.90 mL    LV Systolic Volume 40.37 mL    LVOT peak rah 0.83 m/s    TDI LATERAL 0.08 m/s    TDI SEPTAL 0.04 m/s    LA volume (mod) 47.60 cm3    LV LATERAL E/E' RATIO 6.88 m/s    LV SEPTAL E/E' RATIO 13.75 m/s    FS 36 %    LA volume 43.40 cm3    LV mass 120.06 g    ZLVIDD 0.96     ZLVIDS 0.99     Left Ventricle Relative Wall Thickness 0.27 cm    AV valve area 2.45 cm²    AV Velocity Ratio 0.62     AV index (prosthetic) 0.64     MV valve area p 1/2 method 3.10  cm2    E/A ratio 0.70     Mean e' 0.06 m/s    LVOT area 3.8 cm2    LVOT stroke volume 66.83 cm3    AV peak gradient 7 mmHg    E/E' ratio 9.17 m/s    LV Systolic Volume Index 25.6 mL/m2    LV Diastolic Volume Index 73.99 mL/m2    LA Volume Index 27.5 mL/m2    LV Mass Index 76 g/m2    Triscuspid Valve Regurgitation Peak Gradient 32 mmHg    LA Volume Index (Mod) 30.1 mL/m2    RUDDY by Velocity Ratio 2.37 cm²    BSA 1.57 m2    TV resting pulmonary artery pressure 40 mmHg    RV TB RVSP 11 mmHg    Est. RA pres 8 mmHg    Narrative      Left Ventricle: The left ventricle is normal in size. Normal wall   thickness. Normal wall motion. There is normal systolic function with a   visually estimated ejection fraction of 60 - 65%. There is normal   diastolic function.    Right Ventricle: Mild right ventricular enlargement. Wall thickness is   normal. Right ventricle wall motion  is normal. Systolic function is   normal.    Aortic Valve: The aortic valve is a unicuspid valve. There is mild   aortic valve sclerosis. There is trace aortic regurgitation.    Mitral Valve: There is mild regurgitation.    Pulmonary Artery: There is mild pulmonary hypertension. The estimated   pulmonary artery systolic pressure is 40 mmHg.    IVC/SVC: Intermediate venous pressure at 8 mmHg.         MALLY:  No results found. However, due to the size of the patient record, not all encounters were searched. Please check Results Review for a complete set of results.    ASSESSMENT/PLAN:         Pre-op Assessment    I have reviewed the Patient Summary Reports.     I have reviewed the Nursing Notes. I have reviewed the NPO Status.   I have reviewed the Medications.     Review of Systems  Anesthesia Hx:  No problems with previous Anesthesia   History of prior surgery of interest to airway management or planning:          Denies Family Hx of Anesthesia complications.    Denies Personal Hx of Anesthesia complications.                    Social:  No Alcohol Use, Former  Smoker       Hematology/Oncology:       -- Anemia:                  Denies Current/Recent Cancer                Cardiovascular:     Hypertension   CAD     Denies Dysrhythmias.    Denies CHF.    hyperlipidemia                             Pulmonary:   COPD  Denies Asthma.     Denies Sleep Apnea.                Renal/:   Denies Chronic Renal Disease.                Hepatic/GI:      Denies GERD. Denies Liver Disease.            Musculoskeletal:  Denies Arthritis.               Neurological:    Denies CVA. Denies Neuromuscular Disease.   Denies Seizures.          Denies Chronic Pain Syndrome                         Endocrine:  Denies Diabetes.   Denies Hyperthyroidism.       Denies Obesity / BMI > 30  Psych:   denies anxiety denies depression                Physical Exam  General: Well nourished, Cooperative, Alert and Oriented    Airway:  Mallampati: III   Mouth Opening: Normal  TM Distance: Normal  Tongue: Normal  Neck ROM: Normal ROM    Dental:  Intact        Anesthesia Plan  Type of Anesthesia, risks & benefits discussed:    Anesthesia Type: Gen Natural Airway, MAC, Gen ETT  Intra-op Monitoring Plan: Standard ASA Monitors  Post Op Pain Control Plan: multimodal analgesia  Induction:  IV  Airway Plan: Direct, Post-Induction  Informed Consent: Informed consent signed with the Patient and all parties understand the risks and agree with anesthesia plan.  All questions answered.   ASA Score: 3  Day of Surgery Review of History & Physical: H&P Update referred to the surgeon/provider.    Ready For Surgery From Anesthesia Perspective.     .

## 2024-05-16 NOTE — PLAN OF CARE
MICU DAILY GOALS     Family/Goals of care/Code Status   Code Status: Full Code    24H Vital Sign Range  Temp:  [98.3 °F (36.8 °C)-100.2 °F (37.9 °C)]   Pulse:  []   Resp:  [16-34]   BP: ()/(51-94)   SpO2:  [82 %-100 %]      Shift Events (include procedures and significant events)   Patient traveled CT at start of shift on portable monitor and 4L NC. VSS. Overnight, Pt had multiple dark red large liquid BM, MD notified and assessed at bedside. 2 units of PRBCs administered overnight per MD order. Colonoscopy planned for today.  at bedside.     AWAKE RASS: Goal - RASS Goal: 0-->alert and calm  Actual - RASS (Colby Agitation-Sedation Scale): alert and calm    Restraint necessity: Not necessary   BREATHE SBT: Not intubated    Coordinate A & B, analgesics/sedatives Pain: managed   SAT: Not intubated   Delirium CAM-ICU: Overall CAM-ICU: Negative   Early(intubated/ Progressive (non-intubated) Mobility MOVE Screen (INTUBATED ONLY): Not intubated    Activity: Activity Management: Rolling - L1, Arm raise - L1, Ankle pumps - L1, Leg kicks - L2   Feeding/Nutrition Diet order: Diet/Nutrition Received: NPO,     Thrombus DVT prophylaxis: VTE Required Core Measure: Pharmacological prophylaxis initiated/maintained   HOB Elevation Head of Bed (HOB) Positioning: HOB at 30-45 degrees   Ulcer Prophylaxis GI: yes   Glucose control managed     Skin Skin assessed during: Daily Assessment    Sacrum intact/not altered? No  Heels intact/not altered? Yes  Surgical wound? Yes    CHECK ONE!   (no altered skin or altered skin) and sub boxes:  [] No Altered Skin Integrity Present    []Prevention Measures Documented    [x] Altered Skin Integrity Present or Discovered   [x] LDA present in EPIC, daily doc completed              [] LDA added if not in EPIC (describe wound).                    When describing wound, do not stage, use descriptive words only.    [] Wound Image Taken (required on admit,                    transfer/discharge and every Tuesday)    Wound Care Consulted? Yes    4 EYES:  Attending Nurse (1st set of eyes): BARRY Cole     Second RN/Staff Member (2nd set of eyes): BARRY Yung   Bowel Function GI bleed   Indwelling Catheter Necessity      Urethral Catheter 05/15/24 1600-Reason for Continuing Urinary Catheterization: Urinary retention          De-escalation Antibiotics No        VS and assessment per flow sheet, patient progressing towards goals as tolerated, plan of care reviewed with patient and family, all concerns addressed, will continue to monitor.

## 2024-05-16 NOTE — CHAPLAIN
Staff     Patient: Jessica Floyd  MRN: [id number]  : 1950  Age: 73 y.o.  Legal sex: female   Hospital Length of Stay: 2 days  Code Status: Full Code   Attending Provider: Sachin Rich*  Principal Problem: GI bleed  Patient's Mosque: [employer]  Length of my visit: 15    What prompted this initial visit?: General Rounding - While I was charting at the pod, both of pt's family members who were at bedside came out into hallway. They were both very anxious about pt's condition so I engaged them.    Who was present during my visit: Patient and family - Pt's  and a daughter were in the room.    Feelings (Emotions/Fears/Experiences/Coping): Pt was crying out in pain much of the time I was with the family. They were understandably anxious and concerned but were otherwise coping appropriately and advocating for the pt by asking for her GI team to come and also asking to speak to the charge nurse. I called the charge nurse to help out pt's nurse, Steven, as he was entering the adjacent room. A member of pt's med team arrived at the room shortly thereafter. Charge nurse was going to visit as well.         Family/Friends (Support, Community, Culture): It appears that Mrs. Floyd enjoys the close support of family and friends who are local to her.      Theresa (Beliefs/Meaning/Philosophy): Pt's  confirmed that he and pt are Un[employer]ists.  was on the phone at one point with their most recent  who has moved out of state.  welcomes a  visit but asked that chaplains only speak words of comfort and encouragement to her. Mrs. Floyd was in too much pain to talk so I told her  one of us will try to visit when her pain is managed and she feels like a visit.    Future (Spiritual Care Plan of Action): I'll continue to round on this pt periodically while she is on MICU    I educated the pt's family/friend(s) regarding the services offered by the  Spiritual Care team and told them a  was in the building at all hours to offer them support as needed and told them how to reach out to us.     To Staff: [facility] staff can always reach a  who is in-house and rounding on patients 24/7 by calling [phone]. If a patient or family is in distress and you feel they need support, please call us. In the event a request is made for a visit by a , let us know and we'll arrange that visit as soon as possible.    My work hours and personal [employer] can be found below.     A Spiritual Care Team  is in the building and rounding on patients 24/7. To reach them, call [phone]        Rev. Musa Everett M.Div.  Staff   Spiritual Care Department  [facility]    My SpectraLink: [id number]  Office: [phone]  Email: [email or web address]  Work hours: [phone]    Here is the Code of Ethics which shapes the care I provide.

## 2024-05-16 NOTE — TREATMENT PLAN
GI Treatment Plan:    Please see procedure note for details.    Colonoscopy was very difficult and attempted cecum for approximately 1h 15 minutes. Appeared to reach hepatic flexure. Apparent old blood and clot was seen throughout colon. Diverticula also seen.    Recommendations:  - Monitor Hgb  - Clear liquid diet tonight  - If overt signs of GI blood loss with bright red blood per rectum and associated hypotension and tachycardia, recommend stat CTA to evaluate for possible IR intervention.

## 2024-05-16 NOTE — NURSING
"Pt increasingly restless in bed, moaning out, notified providers and received orders for IV morphine and administered per pain scale. Mild relieft obtained, pt states, "I want to be knocked out and not feel anything.". Team 1 resident notified of pt complaints. Pt daughter/ increasingly agitated d/t GI scheduling conflicts and frustrated with care provided. RN educated pt and family on scheduling and care pt was currently receiving.and discussed plans of care with provider team with family requests presented. Rn escalated to charge RN/Unit manager/ICU team 1. Able to communicate with endoscopy on when they can see pt for bedside colonoscopy. Charge RN met with pt and family at bedside and helped facilitate communication with endo team, pt family satisified at this time time with plan for patient.   "

## 2024-05-16 NOTE — PROVATION PATIENT INSTRUCTIONS
Discharge Summary/Instructions after an Endoscopic Procedure  Patient Name: Jessica Floyd  Patient MRN: [id number]  Patient YOB: 1950  Thursday, May 16, 2024  Rich Syed MD  Dear patient,  As a result of recent federal legislation (The Federal Cures Act), you may   receive lab or pathology results from your procedure in your [facility]   account before your physician is able to contact you. Your physician or   their representative will relay the results to you with their   recommendations at their soonest availability.  Thank you,  RESTRICTIONS:  During your procedure today, you received medications for sedation.  These   medications may affect your judgment, balance and coordination.  Therefore,   for 24 hours, you have the following restrictions:   - DO NOT drive a car, operate machinery, make legal/financial decisions,   sign important papers or drink alcohol.    ACTIVITY:  Today: no heavy lifting, straining or running due to procedural   sedation/anesthesia.  The following day: return to full activity including work.  DIET:  Eat and drink normally unless instructed otherwise.     TREATMENT FOR COMMON SIDE EFFECTS:  - Mild abdominal pain, nausea, belching, bloating or excessive gas:  rest,   eat lightly and use a heating pad.  - Sore Throat: treat with throat lozenges and/or gargle with warm salt   water.  - Because air was used during the procedure, expelling large amounts of air   from your rectum or belching is normal.  - If a bowel prep was taken, you may not have a bowel movement for 1-3 days.    This is normal.  SYMPTOMS TO WATCH FOR AND REPORT TO YOUR PHYSICIAN:  1. Abdominal pain or bloating, other than gas cramps.  2. Chest pain.  3. Back pain.  4. Signs of infection such as: chills or fever occurring within 24 hours   after the procedure.  5. Rectal bleeding, which would show as bright red, maroon, or black stools.   (A tablespoon of blood from the rectum is not serious, especially if    hemorrhoids are present.)  6. Vomiting.  7. Weakness or dizziness.  GO DIRECTLY TO THE NEAREST EMERGENCY ROOM IF YOU HAVE ANY OF THE FOLLOWING:      Difficulty breathing              Chills and/or fever over 101 F   Persistent vomiting and/or vomiting blood   Severe abdominal pain   Severe chest pain   Black, tarry stools   Bleeding- more than one tablespoon   Any other symptom or condition that you feel may need urgent attention  Your doctor recommends these additional instructions:  If any biopsies were taken, your doctors clinic will contact you in 1 to 2   weeks with any results.  - Observe patient in ICU.   - Clear liquid diet.   - Continue present medications.   - I do not feel that repeat colonoscopy is in her best interest at this time   given the difficulty of her colon and inability to advance to the cecum   (risks outweigh benefits at this time).  Monitor for signs of active   bleeding and suggest repeat CTA for if there is evidence of bleeding.   - The findings and recommendations were discussed with the patient's family.     - The findings and recommendations were discussed with the patient's primary   physician.   - No recommendation at this time regarding repeat colonoscopy.  For questions, problems or results please call your physician - Rich Syed MD at Work:  [phone].  [facility], EMERGENCY ROOM PHONE NUMBER: [phone]  IF A COMPLICATION OR EMERGENCY SITUATION ARISES AND YOU ARE UNABLE TO REACH   YOUR PHYSICIAN - GO DIRECTLY TO THE EMERGENCY ROOM.  Rich Syed MD  5/16/2024 5:44:32 PM  This report has been verified and signed electronically.  Dear patient,  As a result of recent federal legislation (The Federal Cures Act), you may   receive lab or pathology results from your procedure in your [facility]   account before your physician is able to contact you. Your physician or   their representative will relay the results to you with their   recommendations at their  soonest availability.  Thank you,  PROVATION

## 2024-05-16 NOTE — PROGRESS NOTES
"[facility] Medical [facility]  Critical Care Medicine  Progress Note    Patient Name: Jessica Floyd  MRN: [id number]  Admission Date: 5/14/2024  Hospital Length of Stay: 2 days  Code Status: Full Code  Attending Provider: Sachin Rich*  Primary Care Provider: Effie Olmedo MD   Principal Problem: GI bleed    Subjective:     HPI:  Mrs. Floyd is a 73 year old female with PMH notable for COPD on home 2L NC, pulm HTN, R carotid stent on ASA, HFpEF, anemia, and ERIK on infusions who presented to [facility] ED with the melena.  is at bedside and reports that the patient had an episode of diarrhea and lethargy yesterday. She reports that it felt as if "she was going to pass out", so she was using her walker to get around. Patient's  reports that this has never happened before. Patient denies excessive OTC NSAID use. She reports that she drinks 2 wine glasses a day. This morning when the patient woke up she felt very lethargic. Patient went to use the bathroom, and  noted bright red stool in the bowl. Patient reported a pre syncopal episode and felt lightheaded. She admits to weakness, SOB, light-headedness, hematochezia, and pre syncope. Patient denies nausea, vomiting, hematemesis, falls, confusion, chest pain, urinary changes, and fevers. Patient reports taking aspirin daily but no DOAC.     Hemoglobin 5.9 at presentation to ED (baseline ~10). GI and IR consulted given concern for active bleed seen on CTA. In the emergency department she was given 1L IVF and a 80 mg IV protonix bolus. Critical care medicine. consulted for GIB. CTA in the Ed revealed "Acute gastrointestinal bleed at the level of the cecum". IR planning for embolization today. Patient is also receiving 2 pRBC due to acute anemia.     Hospital/ICU Course:  Patient admitted with lower GI bleed with multiple episodes of blood per rectum. Initially hypotensive on arrival and MICU consulted. Hgb initially 5.9 in ED down from baseline of " 10. Received two units of blood. CTA abdomen pelvis showed acute bleed within cecum. GI and IR consulted. IR took her for coiling night of admission. Blood pressure improved with fluids and blood. GI planning for colonoscopy.     Interval History/Significant Events: Patient extremely anxious today, pending colonoscopy per GI. CTA overnight with no signs of acute bleed but concern for possible ileus. Patient with multiple bloody bowel movements overnight s/p 1U pRBC.     Review of Systems   Constitutional:  Negative for chills and fever.   HENT:  Negative for nosebleeds, rhinorrhea, sore throat and trouble swallowing.    Eyes:  Negative for visual disturbance.   Respiratory:  Positive for shortness of breath. Negative for cough and wheezing.    Cardiovascular:  Negative for chest pain and leg swelling.   Gastrointestinal:  Positive for blood in stool and diarrhea. Negative for abdominal distention, abdominal pain, nausea and vomiting.   Endocrine: Positive for cold intolerance.   Genitourinary:  Negative for dysuria, hematuria and pelvic pain.   Musculoskeletal:  Negative for back pain.   Skin:  Positive for pallor.   Neurological:  Positive for syncope, weakness and light-headedness.   Psychiatric/Behavioral:  Negative for confusion and decreased concentration.      Objective:     Vital Signs (Most Recent):  Temp: 97.4 °F (36.3 °C) (05/16/24 0800)  Pulse: 106 (05/16/24 1100)  Resp: (!) 30 (05/16/24 1145)  BP: 128/65 (05/16/24 1100)  SpO2: 97 % (05/16/24 1100) Vital Signs (24h Range):  Temp:  [97.4 °F (36.3 °C)-100.2 °F (37.9 °C)] 97.4 °F (36.3 °C)  Pulse:  [] 106  Resp:  [16-39] 30  SpO2:  [82 %-100 %] 97 %  BP: ()/(52-94) 128/65   Weight: 52.6 kg (115 lb 15.4 oz)  Body mass index is 20.54 kg/m².      Intake/Output Summary (Last 24 hours) at 5/16/2024 1220  Last data filed at 5/16/2024 1100  Gross per 24 hour   Intake 3268.99 ml   Output 2500 ml   Net 768.99 ml          Physical Exam  Vitals and nursing  note reviewed.   Constitutional:       General: She is not in acute distress.     Comments: Anxious   HENT:      Head: Normocephalic and atraumatic.      Mouth/Throat:      Mouth: Mucous membranes are dry.      Pharynx: Oropharynx is clear.   Eyes:      General: No scleral icterus.     Extraocular Movements: Extraocular movements intact.      Conjunctiva/sclera: Conjunctivae normal.   Cardiovascular:      Rate and Rhythm: Normal rate and regular rhythm.      Heart sounds: Normal heart sounds. No murmur heard.  Pulmonary:      Effort: Pulmonary effort is normal. No respiratory distress.   Abdominal:      General: Abdomen is flat. There is no distension.      Palpations: Abdomen is soft.      Tenderness: There is abdominal tenderness. There is no guarding or rebound.   Musculoskeletal:         General: No tenderness.      Cervical back: Normal range of motion and neck supple.      Right lower leg: No edema.      Left lower leg: No edema.   Skin:     General: Skin is warm and dry.      Coloration: Skin is pale. Skin is not jaundiced.   Neurological:      General: No focal deficit present.      Mental Status: She is alert and oriented to person, place, and time. Mental status is at baseline.            Vents:  Oxygen Concentration (%): 32 (05/15/24 1954)  Lines/Drains/Airways       Drain  Duration                  Fecal Incontinence  05/16/24 0300 <1 day         Urethral Catheter 05/15/24 1600 <1 day              Peripheral Intravenous Line  Duration                  Peripheral IV - Single Lumen 05/14/24 2100 20 G Anterior;Right Forearm 1 day         Peripheral IV - Single Lumen 05/16/24 0300 20 G Posterior;Right Wrist <1 day                  Significant Labs:    CBC/Anemia Profile:  Recent Labs   Lab 05/15/24  1832 05/15/24  2300 05/15/24  2302 05/16/24  0554   WBC 46.87*  --  39.72* 45.08*   HGB 6.6*  --  7.1* 8.4*   HCT 20.7* 21* 21.3* 24.6*     --  228 219   MCV 97  --  90 90   RDW 17.0*  --  16.2*  15.7*        Chemistries:  Recent Labs   Lab 05/14/24  1256 05/15/24  0409 05/16/24  0408   * 138 146*   K 3.1* 3.0* 2.6*   CL 98 109 110   CO2 21* 19* 23   BUN 27* 21 25*   CREATININE 1.2 0.8 0.9   CALCIUM 8.1* 7.7* 7.3*   ALBUMIN 2.7* 2.1* 2.0*   PROT 6.2 4.6* 4.3*   BILITOT 0.6 1.0 1.5*   ALKPHOS 70 55 55   ALT 19 15 12   AST 32 26 25   MG 1.1* 1.6 2.1   PHOS  --  3.8 3.6       CMP:   Recent Labs   Lab 05/14/24  1256 05/15/24  0409 05/16/24  0408   * 138 146*   K 3.1* 3.0* 2.6*   CL 98 109 110   CO2 21* 19* 23   * 131* 148*   BUN 27* 21 25*   CREATININE 1.2 0.8 0.9   CALCIUM 8.1* 7.7* 7.3*   PROT 6.2 4.6* 4.3*   ALBUMIN 2.7* 2.1* 2.0*   BILITOT 0.6 1.0 1.5*   ALKPHOS 70 55 55   AST 32 26 25   ALT 19 15 12   ANIONGAP 16 10 13       Significant Imaging:  I have reviewed all pertinent imaging results/findings within the past 24 hours.    ABG  Recent Labs   Lab 05/15/24  2300   PH 7.347*   PO2 18*   PCO2 47.9*   HCO3 26.2   BE 1     Assessment/Plan:     Pulmonary  Pulmonary emphysema  Not on oxygen at home.     -Continue home inhalers  -Duonebs PRN    Cardiac/Vascular  (HFpEF) heart failure with preserved ejection fraction  Noted in history but last echo reveals normal function. Not on home medications.    Transthoracic echo (TTE) complete 10/29/2023    Interpretation Summary    Left Ventricle: The left ventricle is normal in size. Normal wall thickness. Normal wall motion. There is normal systolic function with a visually estimated ejection fraction of 60 - 65%. There is normal diastolic function.    Right Ventricle: Mild right ventricular enlargement. Wall thickness is normal. Right ventricle wall motion  is normal. Systolic function is normal.    Aortic Valve: The aortic valve is a unicuspid valve. There is mild aortic valve sclerosis. There is trace aortic regurgitation.    Mitral Valve: There is mild regurgitation.    Pulmonary Artery: There is mild pulmonary hypertension. The estimated  pulmonary artery systolic pressure is 40 mmHg.    IVC/SVC: Intermediate venous pressure at 8 mmHg.        Hypertension  Hypertension Medications                    amLODIPine (NORVASC) 5 MG tablet TAKE 1 TABLET BY MOUTH EVERY DAY     carvediloL (COREG) 6.25 MG tablet Take 1 tablet (6.25 mg total) by mouth 2 (two) times daily with meals.     hydroCHLOROthiazide (HYDRODIURIL) 25 MG tablet Take 1 tablet (25 mg total) by mouth once daily.       -Will hold antihypertensives in setting of acute bleed and hypotension    Bilateral carotid artery stenosis  April 2022     No evidence of hemodynamically significant stenosis is demonstrated in the extracranial carotid arteries.  Patent right carotid stent    GI  * GI bleed  73 year old female with multiple medical problems presenting with melena and some bright red blood with associated pre-syncope found to have hgb of 5.9 (from baseline ~10). CTA with acute gastrointestinal bleed at the level of the cecum. Patient had embolization by IR but continued to to have larger volume dark red blood in stool with falling hgb, repeat CTA without active bleed.     S/p 6U pRBCs    -- GI consulted, plans for colonoscopy  -- IR consulted, embolization performed 5/14  -- NPO   -- Protonix 40 mg BID   -- Hold all A/C and A/P agents   -- Correct any coagulopathy with platelets and FFP for goal of platelets > 50K and INR <2.0   -- Maintain large bore IV access   -- MAP > 65 goal   -- Maintain type and screen   -- Trend CBCs    Other  History Situational (ie Stress Induced) syncope ([facility] admission 12-25-23  History of syncope. Thought to be reflex mediated. Had pre-syncopal episode at home after having diarrhea but before she noticed active bleeding.       Critical secondary to Patient has a condition that poses threat to life and bodily function: GIB      Critical care was time spent personally by me on the following activities: development of treatment plan with patient or surrogate and  bedside caregivers, discussions with consultants, evaluation of patient's response to treatment, examination of patient, ordering and performing treatments and interventions, ordering and review of laboratory studies, ordering and review of radiographic studies, pulse oximetry, re-evaluation of patient's condition. This critical care time did not overlap with that of any other provider or involve time for any procedures.     Alfredito Cobb MD  Critical Care Medicine  [facility] - Cardiac Medical [facility]

## 2024-05-16 NOTE — ANESTHESIA POSTPROCEDURE EVALUATION
Anesthesia Post Evaluation    Patient: Jessica Floyd    Procedure(s) Performed: Procedure(s) (LRB):  COLONOSCOPY (N/A)    Final Anesthesia Type: general      Patient location during evaluation: ICU  Patient participation: Yes- Able to Participate  Level of consciousness: awake and alert  Post-procedure vital signs: reviewed and stable  Pain management: adequate  Airway patency: patent    PONV status at discharge: No PONV  Anesthetic complications: no      Cardiovascular status: blood pressure returned to baseline and hemodynamically stable  Respiratory status: unassisted and nasal cannula  Follow-up not needed.              Vitals Value Taken Time   /65 05/16/24 1647   Temp 36.3 °C (97.4 °F) 05/16/24 0800   Pulse 100 05/16/24 1650   Resp 30 05/16/24 1650   SpO2 100 % 05/16/24 1650   Vitals shown include unfiled device data.      No case tracking events are documented in the log.      Pain/Paris Score: Pain Rating Prior to Med Admin: 9 (5/16/2024 11:45 AM)

## 2024-05-16 NOTE — ANESTHESIA POSTPROCEDURE EVALUATION
Anesthesia Post Evaluation    Patient: Jessica Floyd    Procedure(s) Performed: Procedure(s) (LRB):  EGD (ESOPHAGOGASTRODUODENOSCOPY) (N/A)    Final Anesthesia Type: general      Patient location during evaluation: ICU  Patient participation: Yes- Able to Participate  Level of consciousness: awake and alert and oriented  Post-procedure vital signs: reviewed and stable  Pain management: adequate  Airway patency: patent    PONV status at discharge: No PONV  Anesthetic complications: no      Cardiovascular status: hemodynamically stable  Respiratory status: unassisted  Hydration status: euvolemic  Follow-up not needed.              Vitals Value Taken Time   /58 05/16/24 0732   Temp 37.8 °C (100 °F) 05/16/24 0417   Pulse 99 05/16/24 0742   Resp 20 05/16/24 0742   SpO2 94 % 05/16/24 0742   Vitals shown include unfiled device data.      No case tracking events are documented in the log.      Pain/Paris Score: Pain Rating Prior to Med Admin: 3 (5/15/2024  7:39 AM)

## 2024-05-16 NOTE — ASSESSMENT & PLAN NOTE
73 year old female with multiple medical problems presenting with melena and some bright red blood with associated pre-syncope found to have hgb of 5.9 (from baseline ~10). CTA with acute gastrointestinal bleed at the level of the cecum. Patient had embolization by IR but continued to to have larger volume dark red blood in stool with falling hgb, repeat CTA without active bleed.     S/p 6U pRBCs    -- GI consulted, plans for colonoscopy  -- IR consulted, embolization performed 5/14  -- NPO   -- Protonix 40 mg BID   -- Hold all A/C and A/P agents   -- Correct any coagulopathy with platelets and FFP for goal of platelets > 50K and INR <2.0   -- Maintain large bore IV access   -- MAP > 65 goal   -- Maintain type and screen   -- Trend CBCs

## 2024-05-16 NOTE — H&P
Short Stay Endoscopy History and Physical    PCP - Effie Olmedo MD  Referring Physician - Sachin Rich MD  [address] [place] [address]    Procedure - Colonoscopy  ASA - per anesthesia  Mallampati - per anesthesia  History of Anesthesia problems - no  Family history Anesthesia problems -  no   Plan of anesthesia - General    HPI  73 y.o. female  Reason for procedure:   hematochezia     ROS:  Constitutional: No fevers, chills, No weight loss  CV: No chest pain  Pulm: No cough, No shortness of breath  GI: see HPI    Medical History:  has a past medical history of Allergic rhinitis, Amblyopia, Carotid stenosis, Coronary atherosclerosis, Dyslipidemia, ESBL (extended spectrum beta-lactamase) producing bacteria infection, Hypertension, Nausea and vomiting (05/26/2017), Pulmonary emphysema (10/28/2023), SAH (subarachnoid hemorrhage) (08/24/2016), and Subarachnoid hemorrhage due to ruptured aneurysm (1999).    Surgical History:  has a past surgical history that includes Anterior cruciate ligament repair (2012); Dental surgery; Cerebral aneurysm repair (1999); Tonsillectomy; Adenoidectomy; Appendectomy; Cataract extraction w/  intraocular lens implant (Right, 11/07/2022); Cataract extraction w/  intraocular lens implant (Left, 11/21/2022); Eye surgery (Bilateral); Hip Arthroplasty (Left, 05/28/2023); and Esophagogastroduodenoscopy (N/A, 5/15/2024).    Family History: family history includes Alcohol abuse in her father; Aneurysm in her mother; Gout in her brother; Heart disease in her brother; Hypertension in her father and mother; Kidney disease in her brother; No Known Problems in her daughter, daughter, and daughter..    Social History:  reports that she quit smoking about 25 years ago. Her smoking use included cigarettes. She started smoking about 45 years ago. She has a 10 pack-year smoking history. She has been exposed to tobacco smoke. She has never used smokeless tobacco. She reports  current alcohol use of about 7.0 standard drinks of alcohol per week. She reports that she does not use drugs.    Review of patient's allergies indicates:  No Known Allergies    Medications:   Medications Prior to Admission   Medication Sig Dispense Refill Last Dose    acetaminophen (TYLENOL) 650 MG TbSR Take 1 tablet (650 mg total) by mouth every 6 to 8 hours as needed (pain (mild-moderate)). 120 tablet 0     allopurinoL (ZYLOPRIM) 100 MG tablet Take 2 tablets (200 mg total) by mouth once daily. 180 tablet 3     amLODIPine (NORVASC) 5 MG tablet Take 1 tablet (5 mg total) by mouth once daily. 90 tablet 3     aspirin (ECOTRIN) 81 MG EC tablet Take 81 mg by mouth once daily.       atorvastatin (LIPITOR) 80 MG tablet TAKE 1 TABLET BY MOUTH EVERY DAY 90 tablet 3     bacitracin 500 unit/gram ointment Apply topically 3 (three) times daily. (Patient taking differently: Apply topically 3 (three) times daily as needed (Skin injuries).)  0     carvediloL (COREG) 6.25 MG tablet Take 1 tablet (6.25 mg total) by mouth 2 (two) times daily with meals. 180 tablet 3     celecoxib (CELEBREX) 200 MG capsule Take 1 capsule (200 mg total) by mouth daily as needed for Pain. 30 capsule 2     colchicine (COLCRYS) 0.6 mg tablet Take 1 tablet (0.6 mg total) by mouth once daily. As needed for gout 30 tablet 11     cyanocobalamin 1,000 mcg/mL injection Inject 1mL intramuscularly once weekly. 30 mL 0     fluticasone furoate-vilanteroL (BREO) 100-25 mcg/dose diskus inhaler Inhale 1 puff into the lungs once daily. Controller 30 each 11     fluticasone propionate (FLONASE) 50 mcg/actuation nasal spray SPRAY 2 pumps INTO EACH NOSTRIL ONCE DAILY 16 mL 3     furosemide (LASIX) 20 MG tablet Take 1 tablet (20 mg total) by mouth daily as needed (Leg swelling, SOB, Weight gain 3lb in 1 day or 5 lbs in 2 days.). 30 tablet 11 5/12/2024    hydroCHLOROthiazide (HYDRODIURIL) 25 MG tablet Take 1 tablet (25 mg total) by mouth once daily. 90 tablet 3      "montelukast (SINGULAIR) 10 mg tablet TAKE 1 TABLET BY MOUTH EVERY DAY IN THE EVENING 90 tablet 6     pantoprazole (PROTONIX) 40 MG tablet Take 1 tablet (40 mg total) by mouth once daily. 90 tablet 0     prenatal no122/iron/folic acid (PRENATAL MULTI ORAL) Take 1 tablet by mouth once daily.       QUEtiapine (SEROQUEL) 50 MG tablet Take 1 tablet (50 mg total) by mouth every evening. 90 tablet 1     sodium chloride (SALINE MIST NASL) Apply to each nostril daily for dryness       sodium chloride-aloe vera (SALINE NASAL, ALOE VERA,) Gel Apply to each nostril daily for dryness       tiotropium bromide (SPIRIVA RESPIMAT) 2.5 mcg/actuation inhaler INHALE 2 PUFFS INTO THE LUNGS ONCE DAILY. CONTROLLER 4 g 2     COMIRNATY 2023-24, 11Y UP,,PF, 30 mcg/0.3 mL inection        FLUAD QUAD 2023-24,64Y UP,,PF, 60 mcg (15 mcg x 4)/0.5 mL Syrg        needle, disp, 30 gauge 30 gauge x 1/2" Ndle Use a new needle once, use new needle daily for the first week, then a new needle once weekly for 8 weeks. 30 each 0     ORTHOVISC 30 mg/2 mL        syringe with needle (SYRINGE 3CC/22GX1") 3 mL 22 gauge x 1" Syrg 1 mL by Misc.(Non-Drug; Combo Route) route every 30 days. 15 each 0     VENOFER 100 mg iron/5 mL injection    Unknown       Physical Exam:    Vital Signs:   Vitals:    05/16/24 1430   BP: (!) 140/73   Pulse: (!) 111   Resp: (!) 33   Temp:        General Appearance: Well appearing in no acute distress  Abdomen: Soft, non tender, non distended with normal bowel sounds, no masses    Labs:  Lab Results   Component Value Date    WBC 42.87 (H) 05/16/2024    HGB 7.8 (L) 05/16/2024    HCT 21.7 (L) 05/16/2024     05/16/2024    CHOL 176 04/05/2022    TRIG 88 04/05/2022    HDL 69 04/05/2022    ALT 12 05/16/2024    AST 25 05/16/2024     (H) 05/16/2024    K 2.6 (LL) 05/16/2024     05/16/2024    CREATININE 0.9 05/16/2024    BUN 25 (H) 05/16/2024    CO2 23 05/16/2024    TSH 0.719 10/28/2023    INR 1.0 05/16/2024    HGBA1C 5.2 " 11/18/2023       I have explained the risks and benefits of this endoscopic procedure to the patient including but not limited to bleeding, inflammation, infection, perforation, and death.      Pieter Strickland MD

## 2024-05-17 ENCOUNTER — ANESTHESIA EVENT (OUTPATIENT)
Dept: SURGERY | Facility: HOSPITAL | Age: 74
DRG: 329 | End: 2024-05-17
Payer: MEDICARE

## 2024-05-17 ENCOUNTER — ANESTHESIA (OUTPATIENT)
Dept: SURGERY | Facility: HOSPITAL | Age: 74
DRG: 329 | End: 2024-05-17
Payer: MEDICARE

## 2024-05-17 PROBLEM — R10.9 ABDOMINAL PAIN: Status: ACTIVE | Noted: 2024-05-17

## 2024-05-17 PROBLEM — K56.7 ILEUS: Status: ACTIVE | Noted: 2024-05-17

## 2024-05-17 LAB
ABO + RH BLD: NORMAL
ALBUMIN SERPL BCP-MCNC: 2 G/DL (ref 3.5–5.2)
ALBUMIN SERPL BCP-MCNC: 2.4 G/DL (ref 3.5–5.2)
ALLENS TEST: ABNORMAL
ALLENS TEST: NORMAL
ALP SERPL-CCNC: 102 U/L (ref 55–135)
ALP SERPL-CCNC: 93 U/L (ref 55–135)
ALT SERPL W/O P-5'-P-CCNC: 29 U/L (ref 10–44)
ALT SERPL W/O P-5'-P-CCNC: 38 U/L (ref 10–44)
ANION GAP SERPL CALC-SCNC: 13 MMOL/L (ref 8–16)
ANION GAP SERPL CALC-SCNC: 14 MMOL/L (ref 8–16)
ANISOCYTOSIS BLD QL SMEAR: ABNORMAL
ANISOCYTOSIS BLD QL SMEAR: SLIGHT
ANISOCYTOSIS BLD QL SMEAR: SLIGHT
AST SERPL-CCNC: 140 U/L (ref 10–40)
AST SERPL-CCNC: 80 U/L (ref 10–40)
BASO STIPL BLD QL SMEAR: ABNORMAL
BASOPHILS # BLD AUTO: 0.02 K/UL (ref 0–0.2)
BASOPHILS # BLD AUTO: 0.03 K/UL (ref 0–0.2)
BASOPHILS # BLD AUTO: 0.05 K/UL (ref 0–0.2)
BASOPHILS # BLD AUTO: 0.08 K/UL (ref 0–0.2)
BASOPHILS NFR BLD: 0.1 % (ref 0–1.9)
BASOPHILS NFR BLD: 0.2 % (ref 0–1.9)
BILIRUB SERPL-MCNC: 1.3 MG/DL (ref 0.1–1)
BILIRUB SERPL-MCNC: 2.2 MG/DL (ref 0.1–1)
BLD GP AB SCN CELLS X3 SERPL QL: NORMAL
BLD PROD TYP BPU: NORMAL
BLOOD UNIT EXPIRATION DATE: NORMAL
BLOOD UNIT TYPE CODE: 6200
BLOOD UNIT TYPE: NORMAL
BUN SERPL-MCNC: 16 MG/DL (ref 8–23)
BUN SERPL-MCNC: 22 MG/DL (ref 8–23)
BURR CELLS BLD QL SMEAR: ABNORMAL
BURR CELLS BLD QL SMEAR: ABNORMAL
CALCIUM SERPL-MCNC: 7.6 MG/DL (ref 8.7–10.5)
CALCIUM SERPL-MCNC: 8.3 MG/DL (ref 8.7–10.5)
CANCER AG19-9 SERPL-ACNC: 4.1 U/ML (ref 0–40)
CHLORIDE SERPL-SCNC: 111 MMOL/L (ref 95–110)
CHLORIDE SERPL-SCNC: 113 MMOL/L (ref 95–110)
CO2 SERPL-SCNC: 21 MMOL/L (ref 23–29)
CO2 SERPL-SCNC: 21 MMOL/L (ref 23–29)
CODING SYSTEM: NORMAL
CREAT SERPL-MCNC: 0.8 MG/DL (ref 0.5–1.4)
CREAT SERPL-MCNC: 0.9 MG/DL (ref 0.5–1.4)
CROSSMATCH INTERPRETATION: NORMAL
DACRYOCYTES BLD QL SMEAR: ABNORMAL
DELSYS: ABNORMAL
DELSYS: NORMAL
DIFFERENTIAL METHOD BLD: ABNORMAL
DISPENSE STATUS: NORMAL
DOHLE BOD BLD QL SMEAR: PRESENT
EOSINOPHIL # BLD AUTO: 0 K/UL (ref 0–0.5)
EOSINOPHIL NFR BLD: 0.1 % (ref 0–8)
ERYTHROCYTE [DISTWIDTH] IN BLOOD BY AUTOMATED COUNT: 15.7 % (ref 11.5–14.5)
ERYTHROCYTE [DISTWIDTH] IN BLOOD BY AUTOMATED COUNT: 15.9 % (ref 11.5–14.5)
ERYTHROCYTE [DISTWIDTH] IN BLOOD BY AUTOMATED COUNT: 16.8 % (ref 11.5–14.5)
ERYTHROCYTE [DISTWIDTH] IN BLOOD BY AUTOMATED COUNT: 16.9 % (ref 11.5–14.5)
EST. GFR  (NO RACE VARIABLE): >60 ML/MIN/1.73 M^2
EST. GFR  (NO RACE VARIABLE): >60 ML/MIN/1.73 M^2
FLOW: 12
FLOW: 12
GIANT PLATELETS BLD QL SMEAR: PRESENT
GIANT PLATELETS BLD QL SMEAR: PRESENT
GLUCOSE SERPL-MCNC: 100 MG/DL (ref 70–110)
GLUCOSE SERPL-MCNC: 105 MG/DL (ref 70–110)
HCO3 UR-SCNC: 30.2 MMOL/L (ref 24–28)
HCT VFR BLD AUTO: 23.8 % (ref 37–48.5)
HCT VFR BLD AUTO: 25.5 % (ref 37–48.5)
HCT VFR BLD AUTO: 27.5 % (ref 37–48.5)
HCT VFR BLD AUTO: 32.7 % (ref 37–48.5)
HCT VFR BLD CALC: 26 %PCV (ref 36–54)
HGB BLD-MCNC: 11 G/DL (ref 12–16)
HGB BLD-MCNC: 8.4 G/DL (ref 12–16)
HGB BLD-MCNC: 8.6 G/DL (ref 12–16)
HGB BLD-MCNC: 9.3 G/DL (ref 12–16)
HYPOCHROMIA BLD QL SMEAR: ABNORMAL
IMM GRANULOCYTES # BLD AUTO: 1.52 K/UL (ref 0–0.04)
IMM GRANULOCYTES # BLD AUTO: 1.62 K/UL (ref 0–0.04)
IMM GRANULOCYTES # BLD AUTO: 1.65 K/UL (ref 0–0.04)
IMM GRANULOCYTES # BLD AUTO: 1.85 K/UL (ref 0–0.04)
IMM GRANULOCYTES NFR BLD AUTO: 3.7 % (ref 0–0.5)
IMM GRANULOCYTES NFR BLD AUTO: 4.1 % (ref 0–0.5)
IMM GRANULOCYTES NFR BLD AUTO: 4.2 % (ref 0–0.5)
IMM GRANULOCYTES NFR BLD AUTO: 4.3 % (ref 0–0.5)
INR PPP: 1 (ref 0.8–1.2)
LACTATE SERPL-SCNC: 1.1 MMOL/L (ref 0.5–2.2)
LACTATE SERPL-SCNC: 1.3 MMOL/L (ref 0.5–2.2)
LDH SERPL L TO P-CCNC: 1.06 MMOL/L (ref 0.5–2.2)
LYMPHOCYTES # BLD AUTO: 0.8 K/UL (ref 1–4.8)
LYMPHOCYTES # BLD AUTO: 1.4 K/UL (ref 1–4.8)
LYMPHOCYTES # BLD AUTO: 1.7 K/UL (ref 1–4.8)
LYMPHOCYTES # BLD AUTO: 2.2 K/UL (ref 1–4.8)
LYMPHOCYTES NFR BLD: 2 % (ref 18–48)
LYMPHOCYTES NFR BLD: 3.5 % (ref 18–48)
LYMPHOCYTES NFR BLD: 4.1 % (ref 18–48)
LYMPHOCYTES NFR BLD: 5.1 % (ref 18–48)
MAGNESIUM SERPL-MCNC: 1.9 MG/DL (ref 1.6–2.6)
MAGNESIUM SERPL-MCNC: 2.6 MG/DL (ref 1.6–2.6)
MCH RBC QN AUTO: 30.1 PG (ref 27–31)
MCH RBC QN AUTO: 30.3 PG (ref 27–31)
MCH RBC QN AUTO: 30.5 PG (ref 27–31)
MCH RBC QN AUTO: 30.8 PG (ref 27–31)
MCHC RBC AUTO-ENTMCNC: 33.6 G/DL (ref 32–36)
MCHC RBC AUTO-ENTMCNC: 33.7 G/DL (ref 32–36)
MCHC RBC AUTO-ENTMCNC: 33.8 G/DL (ref 32–36)
MCHC RBC AUTO-ENTMCNC: 35.3 G/DL (ref 32–36)
MCV RBC AUTO: 87 FL (ref 82–98)
MCV RBC AUTO: 89 FL (ref 82–98)
MCV RBC AUTO: 90 FL (ref 82–98)
MCV RBC AUTO: 90 FL (ref 82–98)
MODE: ABNORMAL
MODE: NORMAL
MONOCYTES # BLD AUTO: 4.6 K/UL (ref 0.3–1)
MONOCYTES # BLD AUTO: 5.7 K/UL (ref 0.3–1)
MONOCYTES # BLD AUTO: 6.4 K/UL (ref 0.3–1)
MONOCYTES # BLD AUTO: 6.7 K/UL (ref 0.3–1)
MONOCYTES NFR BLD: 10.8 % (ref 4–15)
MONOCYTES NFR BLD: 14.4 % (ref 4–15)
MONOCYTES NFR BLD: 16.3 % (ref 4–15)
MONOCYTES NFR BLD: 16.4 % (ref 4–15)
NEUTROPHILS # BLD AUTO: 29.6 K/UL (ref 1.8–7.7)
NEUTROPHILS # BLD AUTO: 31 K/UL (ref 1.8–7.7)
NEUTROPHILS # BLD AUTO: 31.7 K/UL (ref 1.8–7.7)
NEUTROPHILS # BLD AUTO: 33.9 K/UL (ref 1.8–7.7)
NEUTROPHILS NFR BLD: 75.5 % (ref 38–73)
NEUTROPHILS NFR BLD: 75.8 % (ref 38–73)
NEUTROPHILS NFR BLD: 79.3 % (ref 38–73)
NEUTROPHILS NFR BLD: 79.6 % (ref 38–73)
NRBC BLD-RTO: 1 /100 WBC
NRBC BLD-RTO: 1 /100 WBC
NRBC BLD-RTO: 2 /100 WBC
NRBC BLD-RTO: 2 /100 WBC
NUM UNITS TRANS FFP: NORMAL
OVALOCYTES BLD QL SMEAR: ABNORMAL
OVALOCYTES BLD QL SMEAR: ABNORMAL
PCO2 BLDA: 56.8 MMHG (ref 35–45)
PH SMN: 7.33 [PH] (ref 7.35–7.45)
PHOSPHATE SERPL-MCNC: 3.4 MG/DL (ref 2.7–4.5)
PLATELET # BLD AUTO: 210 K/UL (ref 150–450)
PLATELET # BLD AUTO: 226 K/UL (ref 150–450)
PLATELET # BLD AUTO: 227 K/UL (ref 150–450)
PLATELET # BLD AUTO: 272 K/UL (ref 150–450)
PLATELET BLD QL SMEAR: ABNORMAL
PMV BLD AUTO: 10.2 FL (ref 9.2–12.9)
PMV BLD AUTO: 10.2 FL (ref 9.2–12.9)
PMV BLD AUTO: 10.6 FL (ref 9.2–12.9)
PMV BLD AUTO: 10.7 FL (ref 9.2–12.9)
PO2 BLDA: 23 MMHG (ref 40–60)
POC BE: 4 MMOL/L
POC IONIZED CALCIUM: 1.08 MMOL/L (ref 1.06–1.42)
POC SATURATED O2: 34 % (ref 95–100)
POC TCO2: 32 MMOL/L (ref 24–29)
POCT GLUCOSE: 111 MG/DL (ref 70–110)
POIKILOCYTOSIS BLD QL SMEAR: SLIGHT
POIKILOCYTOSIS BLD QL SMEAR: SLIGHT
POLYCHROMASIA BLD QL SMEAR: ABNORMAL
POTASSIUM BLD-SCNC: 3.4 MMOL/L (ref 3.5–5.1)
POTASSIUM SERPL-SCNC: 3.7 MMOL/L (ref 3.5–5.1)
POTASSIUM SERPL-SCNC: 4.1 MMOL/L (ref 3.5–5.1)
POTASSIUM SERPL-SCNC: 4.2 MMOL/L (ref 3.5–5.1)
PROT SERPL-MCNC: 5.3 G/DL (ref 6–8.4)
PROT SERPL-MCNC: 5.3 G/DL (ref 6–8.4)
PROTHROMBIN TIME: 10.5 SEC (ref 9–12.5)
RBC # BLD AUTO: 2.73 M/UL (ref 4–5.4)
RBC # BLD AUTO: 2.82 M/UL (ref 4–5.4)
RBC # BLD AUTO: 3.09 M/UL (ref 4–5.4)
RBC # BLD AUTO: 3.63 M/UL (ref 4–5.4)
SAMPLE: ABNORMAL
SAMPLE: NORMAL
SCHISTOCYTES BLD QL SMEAR: ABNORMAL
SCHISTOCYTES BLD QL SMEAR: ABNORMAL
SITE: ABNORMAL
SITE: NORMAL
SODIUM BLD-SCNC: 146 MMOL/L (ref 136–145)
SODIUM SERPL-SCNC: 146 MMOL/L (ref 136–145)
SODIUM SERPL-SCNC: 147 MMOL/L (ref 136–145)
SPECIMEN OUTDATE: NORMAL
SPHEROCYTES BLD QL SMEAR: ABNORMAL
TARGETS BLD QL SMEAR: ABNORMAL
TARGETS BLD QL SMEAR: ABNORMAL
TOXIC GRANULES BLD QL SMEAR: PRESENT
TOXIC GRANULES BLD QL SMEAR: PRESENT
WBC # BLD AUTO: 39.01 K/UL (ref 3.9–12.7)
WBC # BLD AUTO: 39.95 K/UL (ref 3.9–12.7)
WBC # BLD AUTO: 41.09 K/UL (ref 3.9–12.7)
WBC # BLD AUTO: 42.68 K/UL (ref 3.9–12.7)

## 2024-05-17 PROCEDURE — 36000709 HC OR TIME LEV III EA ADD 15 MIN: Mod: NTX | Performed by: STUDENT IN AN ORGANIZED HEALTH CARE EDUCATION/TRAINING PROGRAM

## 2024-05-17 PROCEDURE — 63600175 PHARM REV CODE 636 W HCPCS: Mod: NTX

## 2024-05-17 PROCEDURE — 44120 REMOVAL OF SMALL INTESTINE: CPT | Mod: NTX,,, | Performed by: STUDENT IN AN ORGANIZED HEALTH CARE EDUCATION/TRAINING PROGRAM

## 2024-05-17 PROCEDURE — 63600175 PHARM REV CODE 636 W HCPCS: Mod: NTX | Performed by: NURSE ANESTHETIST, CERTIFIED REGISTERED

## 2024-05-17 PROCEDURE — 86901 BLOOD TYPING SEROLOGIC RH(D): CPT | Mod: NTX | Performed by: INTERNAL MEDICINE

## 2024-05-17 PROCEDURE — 87040 BLOOD CULTURE FOR BACTERIA: CPT | Mod: NTX

## 2024-05-17 PROCEDURE — 83605 ASSAY OF LACTIC ACID: CPT | Mod: NTX

## 2024-05-17 PROCEDURE — 84132 ASSAY OF SERUM POTASSIUM: CPT | Mod: NTX

## 2024-05-17 PROCEDURE — 85014 HEMATOCRIT: CPT | Mod: NTX

## 2024-05-17 PROCEDURE — P9016 RBC LEUKOCYTES REDUCED: HCPCS | Mod: NTX | Performed by: ANESTHESIOLOGY

## 2024-05-17 PROCEDURE — D9220A PRA ANESTHESIA: Mod: CRNA,NTX,, | Performed by: NURSE ANESTHETIST, CERTIFIED REGISTERED

## 2024-05-17 PROCEDURE — 94761 N-INVAS EAR/PLS OXIMETRY MLT: CPT | Mod: NTX

## 2024-05-17 PROCEDURE — 37000008 HC ANESTHESIA 1ST 15 MINUTES: Mod: NTX | Performed by: STUDENT IN AN ORGANIZED HEALTH CARE EDUCATION/TRAINING PROGRAM

## 2024-05-17 PROCEDURE — C9113 INJ PANTOPRAZOLE SODIUM, VIA: HCPCS | Mod: NTX | Performed by: INTERNAL MEDICINE

## 2024-05-17 PROCEDURE — 83735 ASSAY OF MAGNESIUM: CPT | Mod: 91,NTX | Performed by: INTERNAL MEDICINE

## 2024-05-17 PROCEDURE — 63600175 PHARM REV CODE 636 W HCPCS: Mod: JZ,JG,NTX | Performed by: STUDENT IN AN ORGANIZED HEALTH CARE EDUCATION/TRAINING PROGRAM

## 2024-05-17 PROCEDURE — 37000009 HC ANESTHESIA EA ADD 15 MINS: Mod: NTX | Performed by: STUDENT IN AN ORGANIZED HEALTH CARE EDUCATION/TRAINING PROGRAM

## 2024-05-17 PROCEDURE — 27000221 HC OXYGEN, UP TO 24 HOURS: Mod: NTX

## 2024-05-17 PROCEDURE — 84295 ASSAY OF SERUM SODIUM: CPT | Mod: NTX

## 2024-05-17 PROCEDURE — 36620 INSERTION CATHETER ARTERY: CPT | Mod: 59,NTX,, | Performed by: ANESTHESIOLOGY

## 2024-05-17 PROCEDURE — 86301 IMMUNOASSAY TUMOR CA 19-9: CPT | Mod: NTX

## 2024-05-17 PROCEDURE — 36415 COLL VENOUS BLD VENIPUNCTURE: CPT | Mod: NTX | Performed by: INTERNAL MEDICINE

## 2024-05-17 PROCEDURE — D9220A PRA ANESTHESIA: Mod: ANES,NTX,, | Performed by: ANESTHESIOLOGY

## 2024-05-17 PROCEDURE — P9017 PLASMA 1 DONOR FRZ W/IN 8 HR: HCPCS | Mod: NTX

## 2024-05-17 PROCEDURE — 20000000 HC ICU ROOM: Mod: NTX

## 2024-05-17 PROCEDURE — 86920 COMPATIBILITY TEST SPIN: CPT | Mod: NTX | Performed by: ANESTHESIOLOGY

## 2024-05-17 PROCEDURE — 27201423 OPTIME MED/SURG SUP & DEVICES STERILE SUPPLY: Mod: NTX | Performed by: STUDENT IN AN ORGANIZED HEALTH CARE EDUCATION/TRAINING PROGRAM

## 2024-05-17 PROCEDURE — 25000003 PHARM REV CODE 250: Mod: NTX

## 2024-05-17 PROCEDURE — 0DB80ZZ EXCISION OF SMALL INTESTINE, OPEN APPROACH: ICD-10-PCS | Performed by: STUDENT IN AN ORGANIZED HEALTH CARE EDUCATION/TRAINING PROGRAM

## 2024-05-17 PROCEDURE — 84132 ASSAY OF SERUM POTASSIUM: CPT | Mod: NTX | Performed by: INTERNAL MEDICINE

## 2024-05-17 PROCEDURE — 83735 ASSAY OF MAGNESIUM: CPT | Mod: NTX

## 2024-05-17 PROCEDURE — 83605 ASSAY OF LACTIC ACID: CPT | Mod: 91,NTX

## 2024-05-17 PROCEDURE — 85025 COMPLETE CBC W/AUTO DIFF WBC: CPT | Mod: NTX

## 2024-05-17 PROCEDURE — 82330 ASSAY OF CALCIUM: CPT | Mod: NTX

## 2024-05-17 PROCEDURE — 80053 COMPREHEN METABOLIC PANEL: CPT | Mod: NTX

## 2024-05-17 PROCEDURE — 63600175 PHARM REV CODE 636 W HCPCS: Mod: NTX | Performed by: INTERNAL MEDICINE

## 2024-05-17 PROCEDURE — 36430 TRANSFUSION BLD/BLD COMPNT: CPT | Mod: NTX

## 2024-05-17 PROCEDURE — 82803 BLOOD GASES ANY COMBINATION: CPT | Mod: NTX

## 2024-05-17 PROCEDURE — 49505 PRP I/HERN INIT REDUC >5 YR: CPT | Mod: 51,RT,NTX, | Performed by: STUDENT IN AN ORGANIZED HEALTH CARE EDUCATION/TRAINING PROGRAM

## 2024-05-17 PROCEDURE — 25500020 PHARM REV CODE 255: Mod: NTX | Performed by: INTERNAL MEDICINE

## 2024-05-17 PROCEDURE — 36000708 HC OR TIME LEV III 1ST 15 MIN: Mod: NTX | Performed by: STUDENT IN AN ORGANIZED HEALTH CARE EDUCATION/TRAINING PROGRAM

## 2024-05-17 PROCEDURE — 80053 COMPREHEN METABOLIC PANEL: CPT | Mod: 91,NTX | Performed by: INTERNAL MEDICINE

## 2024-05-17 PROCEDURE — 25000003 PHARM REV CODE 250: Mod: NTX | Performed by: NURSE ANESTHETIST, CERTIFIED REGISTERED

## 2024-05-17 PROCEDURE — 85610 PROTHROMBIN TIME: CPT | Mod: NTX

## 2024-05-17 PROCEDURE — 99900035 HC TECH TIME PER 15 MIN (STAT): Mod: NTX

## 2024-05-17 PROCEDURE — 49507 PRP I/HERN INIT BLOCK >5 YR: CPT | Mod: 59,LT,NTX, | Performed by: STUDENT IN AN ORGANIZED HEALTH CARE EDUCATION/TRAINING PROGRAM

## 2024-05-17 PROCEDURE — 99233 SBSQ HOSP IP/OBS HIGH 50: CPT | Mod: GC,NTX,, | Performed by: INTERNAL MEDICINE

## 2024-05-17 PROCEDURE — 88307 TISSUE EXAM BY PATHOLOGIST: CPT | Mod: NTX | Performed by: PATHOLOGY

## 2024-05-17 PROCEDURE — 30233K1 TRANSFUSION OF NONAUTOLOGOUS FROZEN PLASMA INTO PERIPHERAL VEIN, PERCUTANEOUS APPROACH: ICD-10-PCS | Performed by: INTERNAL MEDICINE

## 2024-05-17 PROCEDURE — 88307 TISSUE EXAM BY PATHOLOGIST: CPT | Mod: 26,NTX,, | Performed by: PATHOLOGY

## 2024-05-17 PROCEDURE — 0YJA4ZZ INSPECTION OF BILATERAL INGUINAL REGION, PERCUTANEOUS ENDOSCOPIC APPROACH: ICD-10-PCS | Performed by: STUDENT IN AN ORGANIZED HEALTH CARE EDUCATION/TRAINING PROGRAM

## 2024-05-17 PROCEDURE — 84100 ASSAY OF PHOSPHORUS: CPT | Mod: NTX

## 2024-05-17 PROCEDURE — 27100171 HC OXYGEN HIGH FLOW UP TO 24 HOURS: Mod: NTX

## 2024-05-17 PROCEDURE — 0YQA0ZZ REPAIR BILATERAL INGUINAL REGION, OPEN APPROACH: ICD-10-PCS | Performed by: STUDENT IN AN ORGANIZED HEALTH CARE EDUCATION/TRAINING PROGRAM

## 2024-05-17 RX ORDER — ROCURONIUM BROMIDE 10 MG/ML
INJECTION, SOLUTION INTRAVENOUS
Status: DISCONTINUED | OUTPATIENT
Start: 2024-05-17 | End: 2024-05-17

## 2024-05-17 RX ORDER — VECURONIUM BROMIDE FOR INJECTION 1 MG/ML
INJECTION, POWDER, LYOPHILIZED, FOR SOLUTION INTRAVENOUS
Status: DISCONTINUED | OUTPATIENT
Start: 2024-05-17 | End: 2024-05-17

## 2024-05-17 RX ORDER — HYDROMORPHONE HYDROCHLORIDE 1 MG/ML
1 INJECTION, SOLUTION INTRAMUSCULAR; INTRAVENOUS; SUBCUTANEOUS EVERY 4 HOURS PRN
Status: DISCONTINUED | OUTPATIENT
Start: 2024-05-17 | End: 2024-05-17

## 2024-05-17 RX ORDER — PROPOFOL 10 MG/ML
VIAL (ML) INTRAVENOUS
Status: DISCONTINUED | OUTPATIENT
Start: 2024-05-17 | End: 2024-05-17

## 2024-05-17 RX ORDER — HYDROMORPHONE HYDROCHLORIDE 1 MG/ML
0.5 INJECTION, SOLUTION INTRAMUSCULAR; INTRAVENOUS; SUBCUTANEOUS ONCE
Status: COMPLETED | OUTPATIENT
Start: 2024-05-17 | End: 2024-05-17

## 2024-05-17 RX ORDER — POTASSIUM CHLORIDE 7.45 MG/ML
10 INJECTION INTRAVENOUS
Status: DISPENSED | OUTPATIENT
Start: 2024-05-17 | End: 2024-05-17

## 2024-05-17 RX ORDER — MAGNESIUM SULFATE HEPTAHYDRATE 40 MG/ML
2 INJECTION, SOLUTION INTRAVENOUS ONCE
Status: COMPLETED | OUTPATIENT
Start: 2024-05-17 | End: 2024-05-17

## 2024-05-17 RX ORDER — LIDOCAINE 50 MG/G
1 PATCH TOPICAL
Status: DISCONTINUED | OUTPATIENT
Start: 2024-05-18 | End: 2024-05-19

## 2024-05-17 RX ORDER — ONDANSETRON 2 MG/ML
INJECTION INTRAMUSCULAR; INTRAVENOUS
Status: DISCONTINUED | OUTPATIENT
Start: 2024-05-17 | End: 2024-05-17

## 2024-05-17 RX ORDER — BUPIVACAINE HYDROCHLORIDE 2.5 MG/ML
INJECTION, SOLUTION EPIDURAL; INFILTRATION; INTRACAUDAL
Status: DISCONTINUED | OUTPATIENT
Start: 2024-05-17 | End: 2024-05-17 | Stop reason: HOSPADM

## 2024-05-17 RX ORDER — ONDANSETRON HYDROCHLORIDE 2 MG/ML
INJECTION, SOLUTION INTRAVENOUS
Status: DISCONTINUED | OUTPATIENT
Start: 2024-05-17 | End: 2024-05-17

## 2024-05-17 RX ORDER — HYDROMORPHONE HYDROCHLORIDE 1 MG/ML
0.5 INJECTION, SOLUTION INTRAMUSCULAR; INTRAVENOUS; SUBCUTANEOUS
Status: DISCONTINUED | OUTPATIENT
Start: 2024-05-17 | End: 2024-05-18

## 2024-05-17 RX ORDER — KETAMINE HCL IN 0.9 % NACL 50 MG/5 ML
SYRINGE (ML) INTRAVENOUS
Status: DISCONTINUED | OUTPATIENT
Start: 2024-05-17 | End: 2024-05-17

## 2024-05-17 RX ORDER — PHENYLEPHRINE HYDROCHLORIDE 10 MG/ML
INJECTION INTRAVENOUS
Status: DISCONTINUED | OUTPATIENT
Start: 2024-05-17 | End: 2024-05-17

## 2024-05-17 RX ORDER — LORAZEPAM 2 MG/ML
0.5 INJECTION INTRAMUSCULAR EVERY 6 HOURS PRN
Status: DISCONTINUED | OUTPATIENT
Start: 2024-05-17 | End: 2024-05-17

## 2024-05-17 RX ORDER — LORAZEPAM 2 MG/ML
0.5 INJECTION INTRAMUSCULAR EVERY 4 HOURS PRN
Status: DISCONTINUED | OUTPATIENT
Start: 2024-05-17 | End: 2024-05-18

## 2024-05-17 RX ORDER — ONDANSETRON HYDROCHLORIDE 2 MG/ML
4 INJECTION, SOLUTION INTRAVENOUS EVERY 4 HOURS PRN
Status: DISCONTINUED | OUTPATIENT
Start: 2024-05-17 | End: 2024-06-03 | Stop reason: HOSPADM

## 2024-05-17 RX ORDER — LORAZEPAM 2 MG/ML
0.5 INJECTION INTRAMUSCULAR ONCE
Status: COMPLETED | OUTPATIENT
Start: 2024-05-17 | End: 2024-05-17

## 2024-05-17 RX ORDER — HYDROMORPHONE HYDROCHLORIDE 1 MG/ML
1 INJECTION, SOLUTION INTRAMUSCULAR; INTRAVENOUS; SUBCUTANEOUS EVERY 4 HOURS
Status: DISCONTINUED | OUTPATIENT
Start: 2024-05-17 | End: 2024-05-17

## 2024-05-17 RX ORDER — FENTANYL CITRATE 50 UG/ML
INJECTION, SOLUTION INTRAMUSCULAR; INTRAVENOUS
Status: DISCONTINUED | OUTPATIENT
Start: 2024-05-17 | End: 2024-05-17

## 2024-05-17 RX ORDER — SUCCINYLCHOLINE CHLORIDE 20 MG/ML
INJECTION INTRAMUSCULAR; INTRAVENOUS
Status: DISCONTINUED | OUTPATIENT
Start: 2024-05-17 | End: 2024-05-17

## 2024-05-17 RX ADMIN — SUCCINYLCHOLINE CHLORIDE 120 MG: 20 INJECTION, SOLUTION INTRAMUSCULAR; INTRAVENOUS at 06:05

## 2024-05-17 RX ADMIN — LORAZEPAM 0.5 MG: 2 INJECTION INTRAMUSCULAR; INTRAVENOUS at 11:05

## 2024-05-17 RX ADMIN — ONDANSETRON 0.25 G: 2 INJECTION INTRAMUSCULAR; INTRAVENOUS at 07:05

## 2024-05-17 RX ADMIN — LORAZEPAM 0.5 MG: 2 INJECTION INTRAMUSCULAR; INTRAVENOUS at 03:05

## 2024-05-17 RX ADMIN — MORPHINE SULFATE 2 MG: 2 INJECTION, SOLUTION INTRAMUSCULAR; INTRAVENOUS at 04:05

## 2024-05-17 RX ADMIN — ONDANSETRON 4 MG: 2 INJECTION INTRAMUSCULAR; INTRAVENOUS at 08:05

## 2024-05-17 RX ADMIN — PROPOFOL 100 MG: 10 INJECTION, EMULSION INTRAVENOUS at 06:05

## 2024-05-17 RX ADMIN — PIPERACILLIN SODIUM AND TAZOBACTAM SODIUM 4.5 G: 4; .5 INJECTION, POWDER, FOR SOLUTION INTRAVENOUS at 04:05

## 2024-05-17 RX ADMIN — PHENYLEPHRINE HYDROCHLORIDE 0.3 MCG/KG/MIN: 10 INJECTION INTRAVENOUS at 06:05

## 2024-05-17 RX ADMIN — HYDROMORPHONE HYDROCHLORIDE 0.5 MG: 0.5 INJECTION, SOLUTION INTRAMUSCULAR; INTRAVENOUS; SUBCUTANEOUS at 08:05

## 2024-05-17 RX ADMIN — HYDROMORPHONE HYDROCHLORIDE 0.5 MG: 0.5 INJECTION, SOLUTION INTRAMUSCULAR; INTRAVENOUS; SUBCUTANEOUS at 10:05

## 2024-05-17 RX ADMIN — PHENYLEPHRINE HYDROCHLORIDE 200 MCG: 10 INJECTION INTRAVENOUS at 06:05

## 2024-05-17 RX ADMIN — PHENYLEPHRINE HYDROCHLORIDE 300 MCG: 10 INJECTION INTRAVENOUS at 06:05

## 2024-05-17 RX ADMIN — IOHEXOL 75 ML: 350 INJECTION, SOLUTION INTRAVENOUS at 05:05

## 2024-05-17 RX ADMIN — FENTANYL CITRATE 50 MCG: 50 INJECTION INTRAMUSCULAR; INTRAVENOUS at 07:05

## 2024-05-17 RX ADMIN — SUGAMMADEX 200 MG: 100 INJECTION, SOLUTION INTRAVENOUS at 08:05

## 2024-05-17 RX ADMIN — LORAZEPAM 0.5 MG: 2 INJECTION INTRAMUSCULAR; INTRAVENOUS at 01:05

## 2024-05-17 RX ADMIN — ONDANSETRON 0.25 G: 2 INJECTION INTRAMUSCULAR; INTRAVENOUS at 08:05

## 2024-05-17 RX ADMIN — POTASSIUM CHLORIDE 10 MEQ: 7.46 INJECTION, SOLUTION INTRAVENOUS at 10:05

## 2024-05-17 RX ADMIN — Medication 30 MG: at 06:05

## 2024-05-17 RX ADMIN — VECURONIUM BROMIDE 1 MG: 10 INJECTION, POWDER, LYOPHILIZED, FOR SOLUTION INTRAVENOUS at 08:05

## 2024-05-17 RX ADMIN — FENTANYL CITRATE 50 MCG: 50 INJECTION INTRAMUSCULAR; INTRAVENOUS at 06:05

## 2024-05-17 RX ADMIN — MAGNESIUM SULFATE HEPTAHYDRATE 2 G: 40 INJECTION, SOLUTION INTRAVENOUS at 09:05

## 2024-05-17 RX ADMIN — HYDROMORPHONE HYDROCHLORIDE 0.5 MG: 0.5 INJECTION, SOLUTION INTRAMUSCULAR; INTRAVENOUS; SUBCUTANEOUS at 07:05

## 2024-05-17 RX ADMIN — SODIUM CHLORIDE, SODIUM GLUCONATE, SODIUM ACETATE, POTASSIUM CHLORIDE, MAGNESIUM CHLORIDE, SODIUM PHOSPHATE, DIBASIC, AND POTASSIUM PHOSPHATE: .53; .5; .37; .037; .03; .012; .00082 INJECTION, SOLUTION INTRAVENOUS at 06:05

## 2024-05-17 RX ADMIN — LIDOCAINE 1 PATCH: 50 PATCH CUTANEOUS at 11:05

## 2024-05-17 RX ADMIN — PIPERACILLIN SODIUM AND TAZOBACTAM SODIUM 4.5 G: 4; .5 INJECTION, POWDER, FOR SOLUTION INTRAVENOUS at 10:05

## 2024-05-17 RX ADMIN — ROCURONIUM BROMIDE 10 MG: 10 INJECTION, SOLUTION INTRAVENOUS at 08:05

## 2024-05-17 RX ADMIN — ROCURONIUM BROMIDE 40 MG: 10 INJECTION, SOLUTION INTRAVENOUS at 06:05

## 2024-05-17 RX ADMIN — HYDROMORPHONE HYDROCHLORIDE 0.5 MG: 1 INJECTION, SOLUTION INTRAMUSCULAR; INTRAVENOUS; SUBCUTANEOUS at 11:05

## 2024-05-17 RX ADMIN — PIPERACILLIN SODIUM AND TAZOBACTAM SODIUM 4.5 G: 4; .5 INJECTION, POWDER, FOR SOLUTION INTRAVENOUS at 06:05

## 2024-05-17 RX ADMIN — HYDROMORPHONE HYDROCHLORIDE 0.5 MG: 1 INJECTION, SOLUTION INTRAMUSCULAR; INTRAVENOUS; SUBCUTANEOUS at 05:05

## 2024-05-17 RX ADMIN — HYDROMORPHONE HYDROCHLORIDE 0.5 MG: 1 INJECTION, SOLUTION INTRAMUSCULAR; INTRAVENOUS; SUBCUTANEOUS at 03:05

## 2024-05-17 RX ADMIN — PANTOPRAZOLE SODIUM 40 MG: 40 INJECTION, POWDER, FOR SOLUTION INTRAVENOUS at 09:05

## 2024-05-17 RX ADMIN — HYDROMORPHONE HYDROCHLORIDE 0.5 MG: 1 INJECTION, SOLUTION INTRAMUSCULAR; INTRAVENOUS; SUBCUTANEOUS at 12:05

## 2024-05-17 RX ADMIN — POTASSIUM CHLORIDE 10 MEQ: 7.46 INJECTION, SOLUTION INTRAVENOUS at 12:05

## 2024-05-17 NOTE — ANESTHESIA PREPROCEDURE EVALUATION
[facility]  Anesthesia Pre-Operative Evaluation     Patient Name: Jessica Floyd  YOB: 1950  MRN: [id number]  [facility]: [id number]       Admit Date: 5/14/2024   Admit Team: Networked reference to record PCT   Hospital Day: 4  Date of Procedure: 5/17/2024  Anesthesia: General Procedure: Procedure(s) (LRB):  LAPAROSCOPY, DIAGNOSTIC (N/A)  LAPAROTOMY, EXPLORATORY (N/A)  EXCISION, SMALL INTESTINE (N/A)  Pre-Operative Diagnosis: Melena [K92.1]  Proceduralist:Surgeons and Role:     * Ruben Oscar MD - Primary  Code Status: Full Code   Advanced Directive: <no information>  Isolation Precautions: No active isolations  Capacity: Full capacity     SUBJECTIVE:   Jessica Floyd is a 73 y.o. female who  has a past medical history of Allergic rhinitis, Amblyopia, Carotid stenosis, Coronary atherosclerosis, Dyslipidemia, ESBL (extended spectrum beta-lactamase) producing bacteria infection, Hypertension, Nausea and vomiting (05/26/2017), Pulmonary emphysema (10/28/2023), SAH (subarachnoid hemorrhage) (08/24/2016), and Subarachnoid hemorrhage due to ruptured aneurysm (1999).  Jessica Floyd is a 73 y.o. female with a history of HTN, HFpEF, CKD3, GERD, and CAD who presented to the ED on 5/14/24 with melena. She is altered on my interview and so the history was collected from chart review and the family. Appears she was feeling light headed and as though she was going to pass out. She also noted hematochezia. Upon arrival she was AF, tachycardic, and hypotensive. CTA was concerning for GIB and so she was taken to IR where she was found to have extravasation from the ileal branch of the SMA. They performed a coil embolization of a tertiary branch. Following this, she was reportedly doing well from a mentation standpoint, but has continued to require blood transfusion. She had an EGD on 5/15 and C-scope on 5/16 neither of which revealed a source for her bleed although GI was not able to reach the cecum  due to tortuosity. Over the last couple of days, she has had worsening abdominal pain and she is not able to participate with my interview due to AMS. Her family feels this is pain related although she has been received narcotics and ativan PRN.      She had a low grade fever to 100.6 earlier today. Currently tachycardic to the 100s but HDS. Her CBC is notable for WBC 40 (stable from prior) and H/H 8.6/25.5. her CMP shows a 2 bili of 2.2 but is otherwise unremarkable. Lactic acid is normal at 1.3. She had a repeat CT A/P today which showed dilated bowel consistent with an ileus. However, per her family and the primary team, her pain seems out of proportion to the findings.         Jessica Floyd is a 73 y.o. female w/ a significant PMHx of HTN, HFpEF, CKD3, GERD, and CAD who presented to the ED on 5/14/24 with melena. S/p IR coil embolization of SMA. Now with continued abdominal pain and AMS.    Patient now presents for above procedure(s).     Level of Care: ICU  Hemodynamics: No vasopressor or inotropic support.  Respiratory Status: Room air  NPO: 5/17/24 0000    ECHO:  Results for orders placed during the hospital encounter of 10/28/23    Echo    Interpretation Summary    Left Ventricle: The left ventricle is normal in size. Normal wall thickness. Normal wall motion. There is normal systolic function with a visually estimated ejection fraction of 60 - 65%. There is normal diastolic function.    Right Ventricle: Mild right ventricular enlargement. Wall thickness is normal. Right ventricle wall motion  is normal. Systolic function is normal.    Aortic Valve: The aortic valve is a unicuspid valve. There is mild aortic valve sclerosis. There is trace aortic regurgitation.    Mitral Valve: There is mild regurgitation.    Pulmonary Artery: There is mild pulmonary hypertension. The estimated pulmonary artery systolic pressure is 40 mmHg.    IVC/SVC: Intermediate venous pressure at 8 mmHg.  Hospital LOS: 3 days  ICU  LOS: 2d 20h    she has a current medication list which includes the following long-term medication(s): amlodipine, atorvastatin, carvedilol, colchicine, fluticasone furoate-vilanterol, furosemide, hydrochlorothiazide, pantoprazole, and quetiapine.     ALLERGIES:   Review of patient's allergies indicates:  No Known Allergies  LDA:   AIRWAY:   Oxygen Concentration (%):  [35] 35     * No LDAs found *      Lines/Drains/Airways       Drain  Duration                  Urethral Catheter 05/15/24 1600 2 days         NG/OG Tube 05/17/24 0930 16 Fr. Right nostril <1 day              Peripheral Intravenous Line  Duration                  Peripheral IV - Single Lumen 05/16/24 1846 20 G Anterior;Right Upper Arm <1 day         Peripheral IV - Single Lumen 05/17/24 0800 20 G Distal;Left;Posterior Forearm <1 day         Peripheral IV - Single Lumen 05/17/24 1112 20 G Posterior;Right Forearm <1 day                   Anesthesia Evaluation      Airway   Mallampati: unable to assess  TM distance: Normal  Neck ROM: Normal ROM  Dental    (+) Intact    Pulmonary    (+) COPD  (-) asthma, sleep apnea  Cardiovascular   (+) hypertension, CAD, CHF (HFpEF)  (-) dysrhythmias    Neuro/Psych    (-) seizures, neuromuscular disease, CVA    GI/Hepatic/Renal    (+) chronic renal disease CKD  (-) GERD, liver disease    Endo/Other    (+) hypothyroidism  (-) diabetes mellitus, hyperthyroidism, arthritis  Abdominal                  MEDICATIONS:     Current Outpatient Medications on File Prior to Encounter   Medication Sig Dispense Refill Last Dose    acetaminophen (TYLENOL) 650 MG TbSR Take 1 tablet (650 mg total) by mouth every 6 to 8 hours as needed (pain (mild-moderate)). 120 tablet 0     allopurinoL (ZYLOPRIM) 100 MG tablet Take 2 tablets (200 mg total) by mouth once daily. 180 tablet 3     amLODIPine (NORVASC) 5 MG tablet Take 1 tablet (5 mg total) by mouth once daily. 90 tablet 3     aspirin (ECOTRIN) 81 MG EC tablet Take 81 mg by mouth once  daily.       atorvastatin (LIPITOR) 80 MG tablet TAKE 1 TABLET BY MOUTH EVERY DAY 90 tablet 3     bacitracin 500 unit/gram ointment Apply topically 3 (three) times daily. (Patient taking differently: Apply topically 3 (three) times daily as needed (Skin injuries).)  0     carvediloL (COREG) 6.25 MG tablet Take 1 tablet (6.25 mg total) by mouth 2 (two) times daily with meals. 180 tablet 3     celecoxib (CELEBREX) 200 MG capsule Take 1 capsule (200 mg total) by mouth daily as needed for Pain. 30 capsule 2     colchicine (COLCRYS) 0.6 mg tablet Take 1 tablet (0.6 mg total) by mouth once daily. As needed for gout 30 tablet 11     cyanocobalamin 1,000 mcg/mL injection Inject 1mL intramuscularly once weekly. 30 mL 0     fluticasone furoate-vilanteroL (BREO) 100-25 mcg/dose diskus inhaler Inhale 1 puff into the lungs once daily. Controller 30 each 11     fluticasone propionate (FLONASE) 50 mcg/actuation nasal spray SPRAY 2 pumps INTO EACH NOSTRIL ONCE DAILY 16 mL 3     furosemide (LASIX) 20 MG tablet Take 1 tablet (20 mg total) by mouth daily as needed (Leg swelling, SOB, Weight gain 3lb in 1 day or 5 lbs in 2 days.). 30 tablet 11 5/12/2024    hydroCHLOROthiazide (HYDRODIURIL) 25 MG tablet Take 1 tablet (25 mg total) by mouth once daily. 90 tablet 3     montelukast (SINGULAIR) 10 mg tablet TAKE 1 TABLET BY MOUTH EVERY DAY IN THE EVENING 90 tablet 6     pantoprazole (PROTONIX) 40 MG tablet Take 1 tablet (40 mg total) by mouth once daily. 90 tablet 0     prenatal no122/iron/folic acid (PRENATAL MULTI ORAL) Take 1 tablet by mouth once daily.       QUEtiapine (SEROQUEL) 50 MG tablet Take 1 tablet (50 mg total) by mouth every evening. 90 tablet 1     sodium chloride (SALINE MIST NASL) Apply to each nostril daily for dryness       sodium chloride-aloe vera (SALINE NASAL, ALOE VERA,) Gel Apply to each nostril daily for dryness       tiotropium bromide (SPIRIVA RESPIMAT) 2.5 mcg/actuation inhaler INHALE 2 PUFFS  "INTO THE LUNGS ONCE DAILY. CONTROLLER 4 g 2     COMIRNATY 2023-24, 12Y UP,,PF, 30 mcg/0.3 mL inection        FLUAD QUAD 2023-24,65Y UP,,PF, 60 mcg (15 mcg x 4)/0.5 mL Syrg        needle, disp, 30 gauge 30 gauge x 1/2" Ndle Use a new needle once, use new needle daily for the first week, then a new needle once weekly for 8 weeks. 30 each 0     ORTHOVISC 30 mg/2 mL        syringe with needle (SYRINGE 3CC/22GX1") 3 mL 22 gauge x 1" Syrg 1 mL by Misc.(Non-Drug; Combo Route) route every 30 days. 15 each 0     VENOFER 100 mg iron/5 mL injection    Unknown      Inpatient Medications:  Antibiotics (From admission, onward)      Start     Stop Route Frequency Ordered    05/17/24 0700  piperacillin-tazobactam (ZOSYN) 4.5 g in dextrose 5 % in water (D5W) 100 mL IVPB (MB+)         -- IV Every 8 hours (non-standard times) 05/17/24 0558          VTE Risk Mitigation (From admission, onward)           Ordered     IP VTE HIGH RISK PATIENT  Once         05/15/24 0104     Place sequential compression device  Until discontinued         05/15/24 0104                   pantoprazole  40 mg Intravenous Daily    piperacillin-tazobactam (Zosyn) IV (PEDS and ADULTS) (extended infusion is not appropriate)  4.5 g Intravenous Q8H    sodium chloride  1 spray Each Nostril Daily       Current Facility-Administered Medications   Medication Dose Route Frequency Provider Last Rate Last Admin    albuterol-ipratropium 2.5 mg-0.5 mg/3 mL nebulizer solution 3 mL  3 mL Nebulization Q6H PRN Marcelo Stone DO        HYDROmorphone injection 0.5 mg  0.5 mg Intravenous Q2H PRN Ene Urrutia DO   0.5 mg at 05/17/24 1521    LORazepam injection 0.5 mg  0.5 mg Intravenous Q4H PRN Alfredito Cobb MD   0.5 mg at 05/17/24 1552    ondansetron injection 4 mg  4 mg Intravenous Q4H PRN Alfredito Cobb MD        pantoprazole injection 40 mg  40 mg Intravenous Daily Sachin Rich MD   40 mg at 05/17/24 0956    piperacillin-tazobactam (ZOSYN) 4.5 g " in dextrose 5 % in water (D5W) 100 mL IVPB (MB+)  4.5 g Intravenous Q8H Emeka Cid MD 25 mL/hr at 05/17/24 1621 4.5 g at 05/17/24 1621    sodium chloride 0.65 % nasal spray 1 spray  1 spray Each Nostril Daily Alfredito Cobb MD        sodium chloride 0.9% flush 10 mL  10 mL Intravenous PRN Emeka Cid MD         Facility-Administered Medications Ordered in Other Encounters   Medication Dose Route Frequency Provider Last Rate Last Admin    mupirocin 2 % ointment   Nasal On Call Procedure Kang Diaz MD   Given at 10/25/23 1045    tranexamic acid (CYKLOKAPRON) 1,000 mg in sodium chloride 0.9 % 100 mL IVPB (MB+)  1,000 mg Intravenous Once Kang Diaz MD        tranexamic acid (CYKLOKAPRON) 1,000 mg in sodium chloride 0.9 % 100 mL IVPB (MB+)  1,000 mg Intravenous Once Kang Diaz MD              History:     Active Hospital Problems    Diagnosis  POA    *Acute lower GI bleeding [K92.2]  Yes    Ileus [K56.7]  No    Abdominal pain [R10.9]  No    History Situational (ie Stress Induced) syncope ([facility] 12-25-23 [R55]  Yes    (HFpEF) heart failure with preserved ejection fraction [I50.30]  Yes    Pulmonary emphysema [J43.9]  Yes     Chronic    Acute blood loss anemia [D62]  Yes    Leukocytosis [D72.829]  Yes    Hypertension [I10]  Yes     Chronic    Bilateral carotid artery stenosis [I65.23]  Yes     S/p R CCA stent due to complication from cerebral angiography        Resolved Hospital Problems   No resolved problems to display.     Surgical History:    has a past surgical history that includes Anterior cruciate ligament repair (2012); Dental surgery; Cerebral aneurysm repair (1999); Tonsillectomy; Adenoidectomy; Appendectomy; Cataract extraction w/  intraocular lens implant (Right, 11/07/2022); Cataract extraction w/  intraocular lens implant (Left, 11/21/2022); Eye surgery (Bilateral); Hip Arthroplasty (Left, 05/28/2023); Esophagogastroduodenoscopy (N/A, 5/15/2024); and  Colonoscopy (N/A, 5/16/2024).   Social History:    reports being sexually active and has had partner(s) who are male.  reports that she quit smoking about 25 years ago. Her smoking use included cigarettes. She started smoking about 45 years ago. She has a 10 pack-year smoking history. She has been exposed to tobacco smoke. She has never used smokeless tobacco. She reports current alcohol use of about 7.0 standard drinks of alcohol per week. She reports that she does not use drugs.    Vitals:    05/17/24 1235 05/17/24 1300 05/17/24 1400 05/17/24 1521   BP:  126/66 (!) 149/67    BP Location:  Left arm Left arm    Patient Position:  Lying Lying    Pulse:  102 107    Resp: (!) 26 12 (!) 26 (!) 22   Temp:       TempSrc:       SpO2:  97% 96%    Weight:         Vital Signs Range (Last 24H):  Temp:  [36.7 °C (98.1 °F)-38.1 °C (100.6 °F)]   Pulse:  []   Resp:  [10-45]   BP: (100-163)/(55-91)   SpO2:  [87 %-100 %]     Body mass index is 20.54 kg/m².  Wt Readings from Last 4 Encounters:   05/15/24 52.6 kg (115 lb 15.4 oz)   05/13/24 52.6 kg (115 lb 15.4 oz)   03/29/24 52.6 kg (116 lb)   03/26/24 52.6 kg (115 lb 15.4 oz)        Intake/Output - Last 3 Shifts         05/15 0700  05/16 0659 05/16 0700  05/17 0659 05/17 0700  05/18 0659    P.O. 2120      I.V. (mL/kg) 55.9 (1.1) 34.3 (0.7) 48 (0.9)    Blood 936.8 720.6 01866.8    IV Piggyback  1150 243.3    Total Intake(mL/kg) 3112.7 (59.2) 1905 (36.2) 80921 (206.5)    Urine (mL/kg/hr) 2025 (1.6) 1050 (0.8) 600 (1.1)    Stool 250      Total Output 2275 1050 600    Net +837.7 +855 +00350           Urine Occurrence 1 x      Stool Occurrence 13 x            Lab Results   Component Value Date    WBC 39.95 (H) 05/17/2024    HGB 8.6 (L) 05/17/2024    HCT 25.5 (L) 05/17/2024     05/17/2024     (H) 05/17/2024    K 3.7 05/17/2024     (H) 05/17/2024    CREATININE 0.9 05/17/2024    BUN 22 05/17/2024    CO2 21 (L) 05/17/2024     05/17/2024    CALCIUM 7.6 (L)  05/17/2024    MG 1.9 05/17/2024    PHOS 3.4 05/17/2024    ALKPHOS 93 05/17/2024    ALT 29 05/17/2024    AST 80 (H) 05/17/2024    ALBUMIN 2.4 (L) 05/17/2024    INR 1.0 05/17/2024    APTT 24.5 05/14/2024    HGBA1C 5.2 11/18/2023    TROPONINI 0.015 12/25/2023     (H) 12/25/2023     Recent Results (from the past 12 hour(s))   Cancer antigen 19-9    Collection Time: 05/17/24  8:22 AM   Result Value Ref Range    CA 19-9 4.1 0.0 - 40.0 U/mL   CBC auto differential    Collection Time: 05/17/24 12:06 PM   Result Value Ref Range    WBC 39.95 (H) 3.90 - 12.70 K/uL    RBC 2.82 (L) 4.00 - 5.40 M/uL    Hemoglobin 8.6 (L) 12.0 - 16.0 g/dL    Hematocrit 25.5 (L) 37.0 - 48.5 %    MCV 90 82 - 98 fL    MCH 30.5 27.0 - 31.0 pg    MCHC 33.7 32.0 - 36.0 g/dL    RDW 16.8 (H) 11.5 - 14.5 %    Platelets 210 150 - 450 K/uL    MPV 10.2 9.2 - 12.9 fL    Immature Granulocytes 4.1 (H) 0.0 - 0.5 %    Gran # (ANC) 31.7 (H) 1.8 - 7.7 K/uL    Immature Grans (Abs) 1.65 (H) 0.00 - 0.04 K/uL    Lymph # 0.8 (L) 1.0 - 4.8 K/uL    Mono # 5.7 (H) 0.3 - 1.0 K/uL    Eos # 0.0 0.0 - 0.5 K/uL    Baso # 0.02 0.00 - 0.20 K/uL    nRBC 1 (A) 0 /100 WBC    Gran % 79.3 (H) 38.0 - 73.0 %    Lymph % 2.0 (L) 18.0 - 48.0 %    Mono % 14.4 4.0 - 15.0 %    Eosinophil % 0.1 0.0 - 8.0 %    Basophil % 0.1 0.0 - 1.9 %    Platelet Estimate Appears normal     Aniso Slight     Poly Occasional     Hypo Occasional     Basophilic Stippling Occasional     Differential Method Automated      Recent Labs   Lab 05/15/24  0409 05/15/24  0641 05/16/24  0408 05/16/24  0554 05/16/24 2047 05/17/24  0208 05/17/24  0423 05/17/24  0442 05/17/24  1206   WBC 32.81*   < >  --    < >  --  39.01* 41.09*  --  39.95*   HGB 7.5*   < >  --    < >  --  8.4* 9.3*  --  8.6*   HCT 22.4*   < >  --    < >  --  23.8* 27.5* 26* 25.5*      < >  --    < >  --  227 226  --  210     --  146*  --  144  --  146*  --   --    K 3.0*  --  2.6*  --  3.3*  --  3.7  --   --    CREATININE 0.8  --  0.9   --  0.9  --  0.9  --   --    *  --  148*  --  127*  --  105  --   --    INR 1.0  --  1.0  --   --   --  1.0  --   --     < > = values in this interval not displayed.     No LMP recorded. Patient is postmenopausal.    EKG:   Results for orders placed or performed during the hospital encounter of 05/14/24   EKG 12-lead    Collection Time: 05/14/24 12:36 PM   Result Value Ref Range    QRS Duration 80 ms    [facility] QTC Calculation 461 ms    Narrative    Test Reason : K92.1,    Vent. Rate : 102 BPM     Atrial Rate : 102 BPM     P-R Int : 134 ms          QRS Dur : 080 ms      QT Int : 354 ms       P-R-T Axes : 082 041 256 degrees     QTc Int : 461 ms    Sinus tachycardia with Premature supraventricular complexes  ST and T wave abnormality, consider inferolateral ischemia  Abnormal ECG  When compared with ECG of 27-DEC-2023 10:37,  T wave inversions now present in the inferolateral leads  Confirmed by Michelle Hyman MD ([id number]) on 5/14/2024 12:59:14 PM    Referred By: [email or web address]   SELF           Confirmed By:Michelle Hyman MD     TTE:  Results for orders placed or performed during the hospital encounter of 10/28/23   Echo   Result Value Ref Range    Ascending aorta 4.14 cm    STJ 2.98 cm    AV mean gradient 4 mmHg    Ao peak rah 1.33 m/s    Ao VTI 27.32 cm    IVS 0.79 0.6 - 1.1 cm    LA size 2.93 cm    Left Atrium Major Axis 3.88 cm    Left Atrium Minor Axis 4.39 cm    LVIDd 4.98 3.5 - 6.0 cm    LVIDs 3.18 2.1 - 4.0 cm    LVOT diameter 2.20 cm    LVOT peak VTI 17.59 cm    Posterior Wall 0.67 0.6 - 1.1 cm    MV Peak A Rah 0.79 m/s    E wave deceleration time 244.94 msec    MV Peak E Rah 0.55 m/s    RA Major Axis 4.35 cm    RA Width 4.45 cm    RVDD 3.60 cm    Sinus 3.69 cm    TAPSE 1.78 cm    TR Max Rah 2.84 m/s    LA WIDTH 4.23 cm    MV stenosis pressure 1/2 time 71.03 ms    LV Diastolic Volume 116.90 mL    LV Systolic Volume 40.37 mL    LVOT peak rah 0.83 m/s    TDI LATERAL 0.08 m/s    TDI SEPTAL 0.04 m/s    LA volume (mod)  47.60 cm3    LV LATERAL E/E' RATIO 6.88 m/s    LV SEPTAL E/E' RATIO 13.75 m/s    FS 36 %    LA volume 43.40 cm3    LV mass 120.06 g    ZLVIDD 0.96     ZLVIDS 0.99     Left Ventricle Relative Wall Thickness 0.27 cm    AV valve area 2.45 cm²    AV Velocity Ratio 0.62     AV index (prosthetic) 0.64     MV valve area p 1/2 method 3.10 cm2    E/A ratio 0.70     Mean e' 0.06 m/s    LVOT area 3.8 cm2    LVOT stroke volume 66.83 cm3    AV peak gradient 7 mmHg    E/E' ratio 9.17 m/s    LV Systolic Volume Index 25.6 mL/m2    LV Diastolic Volume Index 73.99 mL/m2    LA Volume Index 27.5 mL/m2    LV Mass Index 76 g/m2    Triscuspid Valve Regurgitation Peak Gradient 32 mmHg    LA Volume Index (Mod) 30.1 mL/m2    RUDDY by Velocity Ratio 2.37 cm²    BSA 1.57 m2    TV resting pulmonary artery pressure 40 mmHg    RV TB RVSP 11 mmHg    Est. RA pres 8 mmHg    Narrative      Left Ventricle: The left ventricle is normal in size. Normal wall   thickness. Normal wall motion. There is normal systolic function with a   visually estimated ejection fraction of 60 - 65%. There is normal   diastolic function.    Right Ventricle: Mild right ventricular enlargement. Wall thickness is   normal. Right ventricle wall motion  is normal. Systolic function is   normal.    Aortic Valve: The aortic valve is a unicuspid valve. There is mild   aortic valve sclerosis. There is trace aortic regurgitation.    Mitral Valve: There is mild regurgitation.    Pulmonary Artery: There is mild pulmonary hypertension. The estimated   pulmonary artery systolic pressure is 40 mmHg.    IVC/SVC: Intermediate venous pressure at 8 mmHg.       No results found. However, due to the size of the patient record, not all encounters were searched. Please check Results Review for a complete set of results.  MALLY:  No results found. However, due to the size of the patient record, not all encounters were searched. Please check Results Review for a complete set of results.  Stress  "Test:  No results found for this or any previous visit.     LHC:  No results found for this or any previous visit.     PFT:  No results found for: "FEV1", "FVC", "BDO4WAO", "TLC", "DLCO"     Pre-op Assessment    I have reviewed the Patient Summary Reports.     I have reviewed the Nursing Notes. I have reviewed the NPO Status.   I have reviewed the Medications.     Review of Systems  Anesthesia Hx:  No problems with previous Anesthesia   History of prior surgery of interest to airway management or planning:          Denies Family Hx of Anesthesia complications.    Denies Personal Hx of Anesthesia complications.                    Social:  No Alcohol Use, Former Smoker       Hematology/Oncology:       -- Anemia:                  Denies Current/Recent Cancer                Cardiovascular:     Hypertension   CAD     Denies Dysrhythmias.   CHF (HFpEF)    hyperlipidemia                             Pulmonary:   COPD  Denies Asthma.     Denies Sleep Apnea.                Renal/:  Chronic Renal Disease, CKD                Hepatic/GI:      Denies GERD. Denies Liver Disease.            Musculoskeletal:  Denies Arthritis.               Neurological:    Denies CVA. Denies Neuromuscular Disease.   Denies Seizures.          Denies Chronic Pain Syndrome                         Endocrine:  Denies Diabetes. Hypothyroidism  Denies Hyperthyroidism.       Denies Obesity / BMI > 30  Psych:   denies anxiety denies depression              Physical Exam  General: Lethargic and Somnolent    Airway:  Mallampati: unable to assess / II  Mouth Opening: Normal  TM Distance: Normal  Tongue: Normal  Neck ROM: Normal ROM    Dental:  Intact    Abdomen:  Tenderness      Anesthesia Plan  Type of Anesthesia, risks & benefits discussed:    Anesthesia Type: Gen ETT  Intra-op Monitoring Plan: Standard ASA Monitors and Art Line  Post Op Pain Control Plan: multimodal analgesia and IV/PO Opioids PRN  Induction:  IV  Airway Plan: Direct and Video, " Post-Induction  Informed Consent: Informed consent signed with the Patient and all parties understand the risks and agree with anesthesia plan.  All questions answered. Patient consented to blood products? Yes  ASA Score: 4 Emergent  Day of Surgery Review of History & Physical: H&P Update referred to the surgeon/provider.    Ready For Surgery From Anesthesia Perspective.     .

## 2024-05-17 NOTE — PLAN OF CARE
MICU DAILY GOALS     Family/Goals of care/Code Status   Code Status: Full Code    24H Vital Sign Range  Temp:  [98.1 °F (36.7 °C)-100.6 °F (38.1 °C)]   Pulse:  []   Resp:  [10-40]   BP: (100-163)/(55-91)   SpO2:  [88 %-100 %]      Shift Events (include procedures and significant events)   Pt to Ex Lap    AWAKE RASS: Goal - RASS Goal: 0-->alert and calm  Actual - RASS ([place] Agitation-Sedation Scale): agitated    Restraint necessity: Not necessary   BREATHE SBT: Not intubated    Coordinate A & B, analgesics/sedatives Pain: managed   SAT: Not intubated   Delirium CAM-ICU: Overall CAM-ICU: Negative   Early(intubated/ Progressive (non-intubated) Mobility MOVE Screen (INTUBATED ONLY): Not intubated    Activity: Activity Management: Rolling - L1   Feeding/Nutrition Diet order: Diet/Nutrition Received: NPO,     Thrombus DVT prophylaxis: VTE Required Core Measure: Per order contraindicated for SCDs/Anticoagulants   HOB Elevation Head of Bed (HOB) Positioning: HOB at 30-45 degrees   Ulcer Prophylaxis GI: yes   Glucose control managed     Skin Skin assessed during: Q Shift Change    Sacrum intact/not altered? No  Heels intact/not altered? Yes  Surgical wound? No    CHECK ONE!   (no altered skin or altered skin) and sub boxes:  [] No Altered Skin Integrity Present    []Prevention Measures Documented    [] Altered Skin Integrity Present or Discovered   [] LDA present in EPIC, daily doc completed              [] LDA added if not in EPIC (describe wound).                    When describing wound, do not stage, use descriptive words only.    [] Wound Image Taken (required on admit,                   transfer/discharge and every Tuesday)    Wound Care Consulted? No    4 EYES:  Attending Nurse (1st set of eyes):     Second RN/Staff Member (2nd set of eyes):    Bowel Function no issues    Indwelling Catheter Necessity      Urethral Catheter 05/15/24 1600-Reason for Continuing Urinary Catheterization: Critically ill in ICU  and requiring hourly monitoring of intake/output          De-escalation Antibiotics Yes        VS and assessment per flow sheet, patient progressing towards goals as tolerated, plan of care reviewed with [unfilled] and family, all concerns addressed, will continue to monitor.

## 2024-05-17 NOTE — ASSESSMENT & PLAN NOTE
Not on oxygen at home.     -Home inhalers held in the setting of worsening ileus, resume when appropriate   -Misha STAPLESN

## 2024-05-17 NOTE — PLAN OF CARE
[facility] - Cardiac Medical ICU  Initial Discharge Assessment       Primary Care Provider: Effie Olmedo MD    Admission Diagnosis: Melena [K92.1]  Acute blood loss anemia [D62]  Leukocytosis [D72.829]  Acute lower GI bleeding [K92.2]    Admission Date: 5/14/2024  Expected Discharge Date: 5/21/2024    Transition of Care Barriers: None    Payor: [employer] MGD MCARE [facility] / Plan: [employer] CHOICES / Product Type: Medicare Advantage /     Extended Emergency Contact Information  Primary Emergency Contact: Sandie Floyd  Address: [address] [place] [address] [id number] [place] [address]  Home Phone: [phone]  Work Phone: [phone]  Mobile Phone: [phone]  Relation: Spouse   needed? No  Secondary Emergency Contact: Ramila Floyd  Address: [address] [place] [address] [place]  Mobile Phone: [phone]  Relation: Daughter    Discharge Plan A: Home with family  Discharge Plan B: Skilled Nursing Facility      [id number] - [place], [place] - [address]  [address] [place] [address]  Phone: [phone] Fax: [phone]    [facility] - [facility] [address]  [address] [facility] [address]  Phone: [phone] Fax: [phone]      Transferred from:     Past Medical History:   Diagnosis Date    Allergic rhinitis     Amblyopia     Carotid stenosis     Coronary atherosclerosis     Outside [facility] 6/2014: 20% mLAD, 50% mCx, 20%, mRCA    Dyslipidemia     ESBL (extended spectrum beta-lactamase) producing bacteria infection     UTI    Hypertension     Nausea and vomiting 05/26/2017    Pulmonary emphysema 10/28/2023    SAH (subarachnoid hemorrhage) 08/24/2016 1999, s/p clipping    Subarachnoid hemorrhage due to ruptured aneurysm 1999         CM met with patient and Sandie Floyd (spouse) [phone], Ramila Floyd (daughter) [phone]  in room for Discharge Planning Assessment.   Patient unable to answer questions due to being in pain.  Per Ramila, patient lives with Sandie Floyd (spouse) [phone]  in a 1-story home with 3 step(s) to enter.   Per Ramila, patient was independent with ADLS and used rollator, straight cane, and wheelchair for ambulation. Per Ramila, patient is not on dialysis and does not take Coumadin. PCP and pharmacy verified. Patient does not have home health, and has not been hospitalized in past 30 days. Patient will have help from Sandie Floyd (spouse) [phone], Ramila Floyd (daughter) [phone]  upon discharge.   Discharge Planning Booklet given to patient/family and discussed.  All questions addressed.  CM will follow for needs.    Discharge Plan A and Plan B have been determined by review of patient's clinical status, future medical and therapeutic needs, and coverage/benefits for post-acute care in coordination with multidisciplinary team members.      Initial Assessment (most recent)       Adult Discharge Assessment - 05/17/24 8988          Discharge Assessment    Assessment Type Discharge Planning Assessment     Confirmed/corrected address, phone number and insurance Yes     Confirmed Demographics Correct on Facesheet     Source of Information family     When was your last doctors appointment? 05/13/24     Communicated DEBBIE with patient/caregiver Date not available/Unable to determine     Reason For Admission Acute lower GI bleeding, Melena     People in Home spouse     Facility Arrived From: Home     Do you expect to return to your current living situation? Yes     Do you have help at home or someone to help you manage your care at home? Yes     Who are your caregiver(s) and their phone number(s)? Sandie Floyd (spouse) [phone], Ramila Floyd (daughter) [phone]     Prior to hospitilization cognitive status: Alert/Oriented     Current cognitive status: Unable to Assess   patient very uncomfortable with pain.    Walking or Climbing Stairs  Difficulty yes     Mobility Management straight cane, rollator, wheelchair, oxygen, raised toilet seat    Dressing/Bathing Difficulty yes     Dressing/Bathing bathing difficulty, assistance 1 person;dressing difficulty, assistance 1 person     Dressing/Bathing Management daughter assists with these tasks     Equipment Currently Used at Home cane, straight;rollator;wheelchair     Readmission within 30 days? No     Patient currently being followed by outpatient case management? No     Do you currently have service(s) that help you manage your care at home? No     Do you take prescription medications? Yes     Do you have prescription coverage? Yes     Coverage [facility] [employer] MGD MCARE [facility] CHOICES     Do you have any problems affording any of your prescribed medications? No     Is the patient taking medications as prescribed? yes     Who is going to help you get home at discharge? Sandie Floyd (spouse) [phone], Ramila Floyd (daughter) [phone]     How do you get to doctors appointments? family or friend will provide     Are you on dialysis? No     Do you take coumadin? No     Discharge Plan A Home with family     Discharge Plan B Skilled Nursing Facility     DME Needed Upon Discharge  other (see comments)   TBD    Discharge Plan discussed with: Adult children;Spouse/sig other     Name(s) and Number(s) Sandie Floyd (spouse) [phone], Ramila Floyd (daughter) [phone]     Transition of Care Barriers None        Physical Activity    On average, how many days per week do you engage in moderate to strenuous exercise (like a brisk walk)? 0 days     On average, how many minutes do you engage in exercise at this level? 0 min        Financial Resource Strain    How hard is it for you to pay for the very basics like food, housing, medical care, and heating? Not hard at all        Housing Stability    In the last 12 months, was there a time when you were not able to pay the mortgage or rent on  time? No     At any time in the past 12 months, were you homeless or living in a shelter (including now)? No        Transportation Needs    Has the lack of transportation kept you from medical appointments, meetings, work or from getting things needed for daily living? No        Food Insecurity    Within the past 12 months, you worried that your food would run out before you got the money to buy more. Never true     Within the past 12 months, the food you bought just didn't last and you didn't have money to get more. Never true        Stress    Do you feel stress - tense, restless, nervous, or anxious, or unable to sleep at night because your mind is troubled all the time - these days? Rather much        Social Isolation    How often do you feel lonely or isolated from those around you?  Patient unable to answer        Alcohol Use    Q1: How often do you have a drink containing alcohol? 2-3 times a week     Q2: How many drinks containing alcohol do you have on a typical day when you are drinking? 1 or 2     Q3: How often do you have six or more drinks on one occasion? Never        Utilities    In the past 12 months has the electric, gas, oil, or water company threatened to shut off services in your home? No        Health Literacy    How often do you need to have someone help you when you read instructions, pamphlets, or other written material from your doctor or pharmacy? Sometimes        OTHER    Name(s) of People in Home Sandie Floyd (spouse) [phone]                            PCP:  Effie Olmedo MD  [phone]        Pharmacy:    [id number] - [facility] [address]  [address] [facility] [address]  Phone: [phone] Fax: [phone]    [facility] - [place], [place] - [address]  [address] [facility] [address]  Phone: [phone] Fax: [phone]        Emergency Contacts:  Extended Emergency Contact Information  Primary Emergency  Contact: Sandie Floyd  Address: [address] [place] [address] [facility]  Home Phone: [phone]  Work Phone: [phone]  Mobile Phone: [phone]  Relation: Spouse   needed? No  Secondary Emergency Contact: Ramila Floyd  Address: [address] [place] [address] [place]  Mobile Phone: [phone]  Relation: Daughter      Insurance:    Payor: [employer] MGD MCARE [facility] / Plan: [employer] CHOICES / Product Type: Medicare Advantage /     Dorothy Hung RN     [phone]      05/17/2024  4:41 PM

## 2024-05-17 NOTE — ASSESSMENT & PLAN NOTE
Jessica Floyd is a 73 y.o. female who presented with a GIB s/p IR embolization of a tertiary branch of the ileal artery now with severe LLQ pain.     - Patient seen and examined. Labs and imaging reviewed. Case discussed with Dr. Oscar  - With regards to her imaging, the bowel with the coil embolization appears to be in the LLQ near her hernia. This is where she is focally tender. Given her pain, I think a diagnostic laparoscopy is reasonable to investigate further, but I explained to the family that this only allows us to evaluate the outside of the bowel. If there is an ischemic process, bowel dies from the inside out so there is a chance we could miss it. However, if we do see ischemic gut, then she would require a laparotomy with small bowel resection.   - Discussed the risks, benefits, and alternatives to diagnostic laparoscopy with possible laparotomy, small bowel resection, and all indicated procedures including but not limited to bleeding, infection, damage to surrounding structures, and missing an ischemic process due to how this affects the bowel. All questions and concerns were addressed to the patient's satisfaction. Informed consent obtained and placed in the chart.  - To OR for Class B  - Please keep NGT to [facility]  - NPO  - mIVF

## 2024-05-17 NOTE — CONSULTS
[facility] - Cardiac Medical ICU  General Surgery  Consult Note    Patient Name: Jessica Floyd  MRN: [id number]  Code Status: Full Code  Admission Date: 5/14/2024  Hospital Length of Stay: 3 days  Attending Physician: Sachin Rich*  Primary Care Provider: Effie Olmedo MD    Patient information was obtained from spouse/SO, relative(s), and past medical records.     Inpatient consult to General Surgery  Consult performed by: Amber Jay MD  Consult ordered by: Alfredito Cobb MD        Subjective:     Principal Problem: Acute lower GI bleeding    History of Present Illness: Jessica Floyd is a 73 y.o. female with a history of HTN, HFpEF, CKD3, GERD, and CAD who presented to the ED on 5/14/24 with melena. She is altered on my interview and so the history was collected from chart review and the family. Appears she was feeling light headed and as though she was going to pass out. She also noted hematochezia. Upon arrival she was AF, tachycardic, and hypotensive. CTA was concerning for GIB and so she was taken to IR where she was found to have extravasation from the ileal branch of the SMA. They performed a coil embolization of a tertiary branch. Following this, she was reportedly doing well from a mentation standpoint, but has continued to require blood transfusion. She had an EGD on 5/15 and C-scope on 5/16 neither of which revealed a source for her bleed although GI was not able to reach the cecum due to tortuosity. Over the last couple of days, she has had worsening abdominal pain and she is not able to participate with my interview due to AMS. Her family feels this is pain related although she has been received narcotics and ativan PRN.      She had a low grade fever to 100.6 earlier today. Currently tachycardic to the 100s but HDS. Her CBC is notable for WBC 40 (stable from prior) and H/H 8.6/25.5. her CMP shows a 2 bili of 2.2 but is otherwise unremarkable. Lactic acid is normal at 1.3. She had a  repeat CT A/P today which showed dilated bowel consistent with an ileus. However, per her family and the primary team, her pain seems out of proportion to the findings.     Current Facility-Administered Medications on File Prior to Encounter   Medication    mupirocin 2 % ointment    tranexamic acid (CYKLOKAPRON) 1,000 mg in sodium chloride 0.9 % 100 mL IVPB (MB+)    tranexamic acid (CYKLOKAPRON) 1,000 mg in sodium chloride 0.9 % 100 mL IVPB (MB+)     Current Outpatient Medications on File Prior to Encounter   Medication Sig    acetaminophen (TYLENOL) 650 MG TbSR Take 1 tablet (650 mg total) by mouth every 6 to 8 hours as needed (pain (mild-moderate)).    allopurinoL (ZYLOPRIM) 100 MG tablet Take 2 tablets (200 mg total) by mouth once daily.    amLODIPine (NORVASC) 5 MG tablet Take 1 tablet (5 mg total) by mouth once daily.    aspirin (ECOTRIN) 81 MG EC tablet Take 81 mg by mouth once daily.    atorvastatin (LIPITOR) 80 MG tablet TAKE 1 TABLET BY MOUTH EVERY DAY    bacitracin 500 unit/gram ointment Apply topically 3 (three) times daily. (Patient taking differently: Apply topically 3 (three) times daily as needed (Skin injuries).)    carvediloL (COREG) 6.25 MG tablet Take 1 tablet (6.25 mg total) by mouth 2 (two) times daily with meals.    celecoxib (CELEBREX) 200 MG capsule Take 1 capsule (200 mg total) by mouth daily as needed for Pain.    colchicine (COLCRYS) 0.6 mg tablet Take 1 tablet (0.6 mg total) by mouth once daily. As needed for gout    cyanocobalamin 1,000 mcg/mL injection Inject 1mL intramuscularly once weekly.    fluticasone furoate-vilanteroL (BREO) 100-25 mcg/dose diskus inhaler Inhale 1 puff into the lungs once daily. Controller    fluticasone propionate (FLONASE) 50 mcg/actuation nasal spray SPRAY 2 pumps INTO EACH NOSTRIL ONCE DAILY    furosemide (LASIX) 20 MG tablet Take 1 tablet (20 mg total) by mouth daily as needed (Leg swelling, SOB, Weight gain 3lb in 1 day or 5 lbs in 2 days.).     "hydroCHLOROthiazide (HYDRODIURIL) 25 MG tablet Take 1 tablet (25 mg total) by mouth once daily.    montelukast (SINGULAIR) 10 mg tablet TAKE 1 TABLET BY MOUTH EVERY DAY IN THE EVENING    pantoprazole (PROTONIX) 40 MG tablet Take 1 tablet (40 mg total) by mouth once daily.    prenatal no122/iron/folic acid (PRENATAL MULTI ORAL) Take 1 tablet by mouth once daily.    QUEtiapine (SEROQUEL) 50 MG tablet Take 1 tablet (50 mg total) by mouth every evening.    sodium chloride (SALINE MIST NASL) Apply to each nostril daily for dryness    sodium chloride-aloe vera (SALINE NASAL, ALOE VERA,) Gel Apply to each nostril daily for dryness    tiotropium bromide (SPIRIVA RESPIMAT) 2.5 mcg/actuation inhaler INHALE 2 PUFFS INTO THE LUNGS ONCE DAILY. CONTROLLER    COMIRNATY 2023-24, 12Y UP,,PF, 30 mcg/0.3 mL inection     FLUAD QUAD 2023-24,65Y UP,,PF, 60 mcg (15 mcg x 4)/0.5 mL Syrg     needle, disp, 30 gauge 30 gauge x 1/2" Ndle Use a new needle once, use new needle daily for the first week, then a new needle once weekly for 8 weeks.    ORTHOVISC 30 mg/2 mL     syringe with needle (SYRINGE 3CC/22GX1") 3 mL 22 gauge x 1" Syrg 1 mL by Misc.(Non-Drug; Combo Route) route every 30 days.    VENOFER 100 mg iron/5 mL injection        Review of patient's allergies indicates:  No Known Allergies    Past Medical History:   Diagnosis Date    Allergic rhinitis     Amblyopia     Carotid stenosis     Coronary atherosclerosis     Outside [facility] 6/2014: 20% mLAD, 50% mCx, 20%, mRCA    Dyslipidemia     ESBL (extended spectrum beta-lactamase) producing bacteria infection     UTI    Hypertension     Nausea and vomiting 05/26/2017    Pulmonary emphysema 10/28/2023    SAH (subarachnoid hemorrhage) 08/24/2016 1999, s/p clipping    Subarachnoid hemorrhage due to ruptured aneurysm 1999     Past Surgical History:   Procedure Laterality Date    ADENOIDECTOMY      ANTERIOR CRUCIATE LIGAMENT REPAIR  2012    APPENDECTOMY      CATARACT EXTRACTION W/  INTRAOCULAR " LENS IMPLANT Right 2022    Procedure: EXTRACTION, CATARACT, WITH IOL INSERTION;  Surgeon: Higinio Cristina MD;  Location: [facility] OR;  Service: Ophthalmology;  Laterality: Right;    CATARACT EXTRACTION W/  INTRAOCULAR LENS IMPLANT Left 2022    Procedure: EXTRACTION, CATARACT, WITH IOL INSERTION;  Surgeon: Higinio Cristina MD;  Location: [facility] OR;  Service: Ophthalmology;  Laterality: Left;    CEREBRAL ANEURYSM REPAIR      clip    COLONOSCOPY N/A 2024    Procedure: COLONOSCOPY;  Surgeon: Rich Syed MD;  Location: [facility] ENDO (2ND FLR);  Service: Endoscopy;  Laterality: N/A;    DENTAL SURGERY      ESOPHAGOGASTRODUODENOSCOPY N/A 5/15/2024    Procedure: EGD (ESOPHAGOGASTRODUODENOSCOPY);  Surgeon: Rich Syed MD;  Location: [facility] ENDO (2ND FLR);  Service: Endoscopy;  Laterality: N/A;    EYE SURGERY Bilateral     cataract removal and lens implants    HIP ARTHROPLASTY Left 2023    Procedure: TOTAL HIP ARTHROPLASTY;  Surgeon: Juan Wan MD;  Location: [facility] OR 2ND FLR;  Service: Orthopedics;  Laterality: Left;    TONSILLECTOMY       Family History       Problem Relation (Age of Onset)    Alcohol abuse Father    Aneurysm Mother    Gout Brother    Heart disease Brother    Hypertension Mother, Father    Kidney disease Brother    No Known Problems Daughter, Daughter, Daughter          Tobacco Use    Smoking status: Former     Current packs/day: 0.00     Average packs/day: 0.5 packs/day for 20.0 years (10.0 ttl pk-yrs)     Types: Cigarettes     Start date: 1979     Quit date: 1999     Years since quittin.3     Passive exposure: Past    Smokeless tobacco: Never   Substance and Sexual Activity    Alcohol use: Yes     Alcohol/week: 7.0 standard drinks of alcohol     Types: 7 Glasses of wine per week     Comment: One glass of Red wine prior to dinner    Drug use: No    Sexual activity: Yes     Partners: Male     Review of Systems   Unable to perform ROS: Mental status change      Objective:     Vital Signs (Most Recent):  Temp: (!) 100.6 °F (38.1 °C) (05/17/24 1100)  Pulse: 107 (05/17/24 1400)  Resp: (!) 26 (05/17/24 1400)  BP: (!) 149/67 (05/17/24 1400)  SpO2: 96 % (05/17/24 1400) Vital Signs (24h Range):  Temp:  [98.1 °F (36.7 °C)-100.6 °F (38.1 °C)] 100.6 °F (38.1 °C)  Pulse:  [] 107  Resp:  [10-45] 26  SpO2:  [87 %-100 %] 96 %  BP: (100-163)/(55-91) 149/67     Weight: 52.6 kg (115 lb 15.4 oz)  Body mass index is 20.54 kg/m².     Physical Exam  Vitals and nursing note reviewed.   Constitutional:       Appearance: She is well-developed. She is not diaphoretic.      Interventions: Nasal cannula in place.      Comments: Appears uncomfortable   HENT:      Nose:      Comments: NGT in place     Mouth/Throat:      Pharynx: Oropharynx is clear. No oropharyngeal exudate.   Eyes:      General: No scleral icterus.     Extraocular Movements: Extraocular movements intact.   Cardiovascular:      Rate and Rhythm: Normal rate and regular rhythm.   Pulmonary:      Effort: Pulmonary effort is normal. No respiratory distress.   Abdominal:      Comments: Soft, non-distended. Diffusely tender but mostly in the LLQ at her hernia site   Genitourinary:     Comments: Maldonado in place  Musculoskeletal:         General: No deformity. Normal range of motion.   Skin:     General: Skin is warm and dry.      Coloration: Skin is not jaundiced.   Neurological:      General: No focal deficit present.      Cranial Nerves: No cranial nerve deficit.   Psychiatric:      Comments: Unable to assess            I have reviewed all pertinent lab results within the past 24 hours.  CBC:   Recent Labs   Lab 05/17/24  1206   WBC 39.95*   RBC 2.82*   HGB 8.6*   HCT 25.5*      MCV 90   MCH 30.5   MCHC 33.7     CMP:   Recent Labs   Lab 05/17/24  0423      CALCIUM 7.6*   ALBUMIN 2.4*   PROT 5.3*   *   K 3.7   CO2 21*   *   BUN 22   CREATININE 0.9   ALKPHOS 93   ALT 29   AST 80*   BILITOT 2.2*        Significant Diagnostics:  I have reviewed all pertinent imaging results/findings within the past 24 hours.    Assessment/Plan:     * Acute lower GI bleeding  Jessica Floyd is a 73 y.o. female who presented with a GIB s/p IR embolization of a tertiary branch of the ileal artery now with severe LLQ pain.     - Patient seen and examined. Labs and imaging reviewed. Case discussed with Dr. Oscar  - With regards to her imaging, the bowel with the coil embolization appears to be in the LLQ near her hernia. This is where she is focally tender. Given her pain, I think a diagnostic laparoscopy is reasonable to investigate further, but I explained to the family that this only allows us to evaluate the outside of the bowel. If there is an ischemic process, bowel dies from the inside out so there is a chance we could miss it. However, if we do see ischemic gut, then she would require a laparotomy with small bowel resection.   - Discussed the risks, benefits, and alternatives to diagnostic laparoscopy with possible laparotomy, small bowel resection, and all indicated procedures including but not limited to bleeding, infection, damage to surrounding structures, and missing an ischemic process due to how this affects the bowel. All questions and concerns were addressed to the patient's satisfaction. Informed consent obtained and placed in the chart.  - To OR for Class B  - Please keep NGT to LIWS  - NPO  - mIVF        VTE Risk Mitigation (From admission, onward)           Ordered     IP VTE HIGH RISK PATIENT  Once         05/15/24 0104     Place sequential compression device  Until discontinued         05/15/24 0104                    Thank you for your consult. I will follow-up with patient. Please contact us if you have any additional questions.    Amber Jay MD  General Surgery  [facility] - Cardiac Medical ICU

## 2024-05-17 NOTE — ASSESSMENT & PLAN NOTE
Pain out of proportion to exam  General surgery consulted with plans for exploratory lap to look for bowel ischemia  Lactic acid WNL  Concern for possible bowel ischemia of herniated bowel at this time

## 2024-05-17 NOTE — SUBJECTIVE & OBJECTIVE
Interval History/Significant Events: Patient with Hg 5.8 overnight, s/p 2U pRBCs, FFP, platelets with repeat Hg 9.3. 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 CT chest without concern for new PNA but has pulmonary nodules, Ca-19 pending. CT abdomen with continued ileus and gut wall edema.    Patient continues to have abdominal pain out of proportion to exam. General surgery consulted for evaluation. NGT placed to suction.     Review of Systems   Constitutional:  Negative for chills and fever.   HENT:  Negative for nosebleeds, rhinorrhea, sore throat and trouble swallowing.    Eyes:  Negative for visual disturbance.   Respiratory:  Positive for shortness of breath. Negative for cough and wheezing.    Cardiovascular:  Negative for chest pain and leg swelling.   Gastrointestinal:  Positive for abdominal distention and abdominal pain.   Endocrine: Positive for cold intolerance.   Genitourinary:  Negative for dysuria, hematuria and pelvic pain.   Musculoskeletal:  Negative for back pain.   Skin:  Positive for pallor.   Neurological:  Positive for syncope, weakness and light-headedness.   Psychiatric/Behavioral:  Negative for confusion and decreased concentration.      Objective:     Vital Signs (Most Recent):  Temp: (!) 100.6 °F (38.1 °C) (05/17/24 1100)  Pulse: 98 (05/17/24 1132)  Resp: (!) 26 (05/17/24 1235)  BP: (!) 122/58 (05/17/24 1100)  SpO2: 97 % (05/17/24 1100) Vital Signs (24h Range):  Temp:  [98.1 °F (36.7 °C)-100.6 °F (38.1 °C)] 100.6 °F (38.1 °C)  Pulse:  [] 98  Resp:  [10-45] 26  SpO2:  [87 %-100 %] 97 %  BP: (100-163)/(55-91) 122/58   Weight: 52.6 kg (115 lb 15.4 oz)  Body mass index is 20.54 kg/m².      Intake/Output Summary (Last 24 hours) at 5/17/2024 1429  Last data filed at 5/17/2024 1228  Gross per 24 hour   Intake 87428.65 ml   Output 1425 ml   Net 92565.65 ml          Physical Exam  Vitals and nursing note reviewed.    Constitutional:       General: She is not in acute distress.     Comments: Anxious   HENT:      Head: Normocephalic and atraumatic.      Mouth/Throat:      Mouth: Mucous membranes are dry.      Pharynx: Oropharynx is clear.   Eyes:      General: No scleral icterus.     Extraocular Movements: Extraocular movements intact.      Conjunctiva/sclera: Conjunctivae normal.   Cardiovascular:      Rate and Rhythm: Normal rate and regular rhythm.      Heart sounds: Normal heart sounds. No murmur heard.  Pulmonary:      Effort: Pulmonary effort is normal. No respiratory distress.   Abdominal:      General: Abdomen is flat. There is no distension.      Palpations: Abdomen is soft.      Tenderness: There is abdominal tenderness. There is no guarding or rebound.   Musculoskeletal:         General: No tenderness.      Cervical back: Normal range of motion and neck supple.      Right lower leg: No edema.      Left lower leg: No edema.   Skin:     General: Skin is warm and dry.      Coloration: Skin is pale. Skin is not jaundiced.   Neurological:      General: No focal deficit present.      Mental Status: She is alert and oriented to person, place, and time. Mental status is at baseline.            Vents:  Oxygen Concentration (%): 35 (05/17/24 0442)  Lines/Drains/Airways       Drain  Duration                  Urethral Catheter 05/15/24 1600 1 day         NG/OG Tube 05/17/24 0930 16 Fr. Right nostril <1 day              Peripheral Intravenous Line  Duration                  Peripheral IV - Single Lumen 05/16/24 1846 20 G Anterior;Right Upper Arm <1 day         Peripheral IV - Single Lumen 05/17/24 0800 20 G Distal;Left;Posterior Forearm <1 day         Peripheral IV - Single Lumen 05/17/24 1112 20 G Posterior;Right Forearm <1 day                  Significant Labs:    CBC/Anemia Profile:  Recent Labs   Lab 05/17/24  0208 05/17/24  0423 05/17/24  0442 05/17/24  1206   WBC 39.01* 41.09*  --  39.95*   HGB 8.4* 9.3*  --  8.6*   HCT  23.8* 27.5* 26* 25.5*    226  --  210   MCV 87 89  --  90   RDW 15.7* 15.9*  --  16.8*        Chemistries:  Recent Labs   Lab 05/16/24 0408 05/16/24 2047 05/17/24  0423   * 144 146*   K 2.6* 3.3* 3.7    110 111*   CO2 23 22* 21*   BUN 25* 25* 22   CREATININE 0.9 0.9 0.9   CALCIUM 7.3* 7.2* 7.6*   ALBUMIN 2.0* 1.9* 2.4*   PROT 4.3* 4.3* 5.3*   BILITOT 1.5* 0.9 2.2*   ALKPHOS 55 75 93   ALT 12 20 29   AST 25 54* 80*   MG 2.1  --  1.9   PHOS 3.6  --  3.4       CMP:   Recent Labs   Lab 05/16/24 0408 05/16/24 2047 05/17/24  0423   * 144 146*   K 2.6* 3.3* 3.7    110 111*   CO2 23 22* 21*   * 127* 105   BUN 25* 25* 22   CREATININE 0.9 0.9 0.9   CALCIUM 7.3* 7.2* 7.6*   PROT 4.3* 4.3* 5.3*   ALBUMIN 2.0* 1.9* 2.4*   BILITOT 1.5* 0.9 2.2*   ALKPHOS 55 75 93   AST 25 54* 80*   ALT 12 20 29   ANIONGAP 13 12 14       Significant Imaging:  I have reviewed all pertinent imaging results/findings within the past 24 hours.

## 2024-05-17 NOTE — ASSESSMENT & PLAN NOTE
Leukocytosis on admission, initially thought to be acute phase reactant to GIB as patient afebrile.   - Started on IV Zosyn for concern for intraabdominal infection given worsening abdominal pain, distension  - Will follow up Bcx2

## 2024-05-17 NOTE — SUBJECTIVE & OBJECTIVE
Current Facility-Administered Medications on File Prior to Encounter   Medication    mupirocin 2 % ointment    tranexamic acid (CYKLOKAPRON) 1,000 mg in sodium chloride 0.9 % 100 mL IVPB (MB+)    tranexamic acid (CYKLOKAPRON) 1,000 mg in sodium chloride 0.9 % 100 mL IVPB (MB+)     Current Outpatient Medications on File Prior to Encounter   Medication Sig    acetaminophen (TYLENOL) 650 MG TbSR Take 1 tablet (650 mg total) by mouth every 6 to 8 hours as needed (pain (mild-moderate)).    allopurinoL (ZYLOPRIM) 100 MG tablet Take 2 tablets (200 mg total) by mouth once daily.    amLODIPine (NORVASC) 5 MG tablet Take 1 tablet (5 mg total) by mouth once daily.    aspirin (ECOTRIN) 81 MG EC tablet Take 81 mg by mouth once daily.    atorvastatin (LIPITOR) 80 MG tablet TAKE 1 TABLET BY MOUTH EVERY DAY    bacitracin 500 unit/gram ointment Apply topically 3 (three) times daily. (Patient taking differently: Apply topically 3 (three) times daily as needed (Skin injuries).)    carvediloL (COREG) 6.25 MG tablet Take 1 tablet (6.25 mg total) by mouth 2 (two) times daily with meals.    celecoxib (CELEBREX) 200 MG capsule Take 1 capsule (200 mg total) by mouth daily as needed for Pain.    colchicine (COLCRYS) 0.6 mg tablet Take 1 tablet (0.6 mg total) by mouth once daily. As needed for gout    cyanocobalamin 1,000 mcg/mL injection Inject 1mL intramuscularly once weekly.    fluticasone furoate-vilanteroL (BREO) 100-25 mcg/dose diskus inhaler Inhale 1 puff into the lungs once daily. Controller    fluticasone propionate (FLONASE) 50 mcg/actuation nasal spray SPRAY 2 pumps INTO EACH NOSTRIL ONCE DAILY    furosemide (LASIX) 20 MG tablet Take 1 tablet (20 mg total) by mouth daily as needed (Leg swelling, SOB, Weight gain 3lb in 1 day or 5 lbs in 2 days.).    hydroCHLOROthiazide (HYDRODIURIL) 25 MG tablet Take 1 tablet (25 mg total) by mouth once daily.    montelukast (SINGULAIR) 10 mg tablet TAKE 1 TABLET BY MOUTH EVERY DAY IN THE EVENING  "   pantoprazole (PROTONIX) 40 MG tablet Take 1 tablet (40 mg total) by mouth once daily.    prenatal no122/iron/folic acid (PRENATAL MULTI ORAL) Take 1 tablet by mouth once daily.    QUEtiapine (SEROQUEL) 50 MG tablet Take 1 tablet (50 mg total) by mouth every evening.    sodium chloride (SALINE MIST NASL) Apply to each nostril daily for dryness    sodium chloride-aloe vera (SALINE NASAL, ALOE VERA,) Gel Apply to each nostril daily for dryness    tiotropium bromide (SPIRIVA RESPIMAT) 2.5 mcg/actuation inhaler INHALE 2 PUFFS INTO THE LUNGS ONCE DAILY. CONTROLLER    COMIRNATY 2023-24, 12Y UP,,PF, 30 mcg/0.3 mL inection     FLUAD QUAD 2023-24,65Y UP,,PF, 60 mcg (15 mcg x 4)/0.5 mL Syrg     needle, disp, 30 gauge 30 gauge x 1/2" Ndle Use a new needle once, use new needle daily for the first week, then a new needle once weekly for 8 weeks.    ORTHOVISC 30 mg/2 mL     syringe with needle (SYRINGE 3CC/22GX1") 3 mL 22 gauge x 1" Syrg 1 mL by Misc.(Non-Drug; Combo Route) route every 30 days.    VENOFER 100 mg iron/5 mL injection        Review of patient's allergies indicates:  No Known Allergies    Past Medical History:   Diagnosis Date    Allergic rhinitis     Amblyopia     Carotid stenosis     Coronary atherosclerosis     [facility] 6/2014: 20% mLAD, 50% mCx, 20%, mRCA    Dyslipidemia     ESBL (extended spectrum beta-lactamase) producing bacteria infection     UTI    Hypertension     Nausea and vomiting 05/26/2017    Pulmonary emphysema 10/28/2023    SAH (subarachnoid hemorrhage) 08/24/2016 1999, s/p clipping    Subarachnoid hemorrhage due to ruptured aneurysm 1999     Past Surgical History:   Procedure Laterality Date    ADENOIDECTOMY      ANTERIOR CRUCIATE LIGAMENT REPAIR  2012    APPENDECTOMY      CATARACT EXTRACTION W/  INTRAOCULAR LENS IMPLANT Right 11/07/2022    Procedure: EXTRACTION, CATARACT, WITH IOL INSERTION;  Surgeon: Higinio Cristina MD;  Location: [facility];  Service: Ophthalmology;  Laterality: Right;    " CATARACT EXTRACTION W/  INTRAOCULAR LENS IMPLANT Left 2022    Procedure: EXTRACTION, CATARACT, WITH IOL INSERTION;  Surgeon: Higinio Cristina MD;  Location: [facility];  Service: Ophthalmology;  Laterality: Left;    CEREBRAL ANEURYSM REPAIR      clip    COLONOSCOPY N/A 2024    Procedure: COLONOSCOPY;  Surgeon: Rich Syed MD;  Location: [facility] ENDO (2ND FLR);  Service: Endoscopy;  Laterality: N/A;    DENTAL SURGERY      ESOPHAGOGASTRODUODENOSCOPY N/A 5/15/2024    Procedure: EGD (ESOPHAGOGASTRODUODENOSCOPY);  Surgeon: Rich Syed MD;  Location: [facility] ENDO (2ND FLR);  Service: Endoscopy;  Laterality: N/A;    EYE SURGERY Bilateral     cataract removal and lens implants    HIP ARTHROPLASTY Left 2023    Procedure: TOTAL HIP ARTHROPLASTY;  Surgeon: Juan Wan MD;  Location: [facility] 2ND FLR;  Service: Orthopedics;  Laterality: Left;    TONSILLECTOMY       Family History       Problem Relation (Age of Onset)    Alcohol abuse Father    Aneurysm Mother    Gout Brother    Heart disease Brother    Hypertension Mother, Father    Kidney disease Brother    No Known Problems Daughter, Daughter, Daughter          Tobacco Use    Smoking status: Former     Current packs/day: 0.00     Average packs/day: 0.5 packs/day for 20.0 years (10.0 ttl pk-yrs)     Types: Cigarettes     Start date: 1979     Quit date: 1999     Years since quittin.3     Passive exposure: Past    Smokeless tobacco: Never   Substance and Sexual Activity    Alcohol use: Yes     Alcohol/week: 7.0 standard drinks of alcohol     Types: 7 Glasses of wine per week     Comment: One glass of Red wine prior to dinner    Drug use: No    Sexual activity: Yes     Partners: Male     Review of Systems   Unable to perform ROS: Mental status change     Objective:     Vital Signs (Most Recent):  Temp: (!) 100.6 °F (38.1 °C) (24 1100)  Pulse: 107 (24 1400)  Resp: (!) 26 (24 1400)  BP: (!) 149/67 (24  1400)  SpO2: 96 % (05/17/24 1400) Vital Signs (24h Range):  Temp:  [98.1 °F (36.7 °C)-100.6 °F (38.1 °C)] 100.6 °F (38.1 °C)  Pulse:  [] 107  Resp:  [10-45] 26  SpO2:  [87 %-100 %] 96 %  BP: (100-163)/(55-91) 149/67     Weight: 52.6 kg (115 lb 15.4 oz)  Body mass index is 20.54 kg/m².     Physical Exam  Vitals and nursing note reviewed.   Constitutional:       Appearance: She is well-developed. She is not diaphoretic.      Interventions: Nasal cannula in place.      Comments: Appears uncomfortable   HENT:      Nose:      Comments: NGT in place     Mouth/Throat:      Pharynx: Oropharynx is clear. No oropharyngeal exudate.   Eyes:      General: No scleral icterus.     Extraocular Movements: Extraocular movements intact.   Cardiovascular:      Rate and Rhythm: Normal rate and regular rhythm.   Pulmonary:      Effort: Pulmonary effort is normal. No respiratory distress.   Abdominal:      Comments: Soft, non-distended. Diffusely tender but mostly in the LLQ at her hernia site   Genitourinary:     Comments: Maldonado in place  Musculoskeletal:         General: No deformity. Normal range of motion.   Skin:     General: Skin is warm and dry.      Coloration: Skin is not jaundiced.   Neurological:      General: No focal deficit present.      Cranial Nerves: No cranial nerve deficit.   Psychiatric:      Comments: Unable to assess            I have reviewed all pertinent lab results within the past 24 hours.  CBC:   Recent Labs   Lab 05/17/24  1206   WBC 39.95*   RBC 2.82*   HGB 8.6*   HCT 25.5*      MCV 90   MCH 30.5   MCHC 33.7     CMP:   Recent Labs   Lab 05/17/24  0423      CALCIUM 7.6*   ALBUMIN 2.4*   PROT 5.3*   *   K 3.7   CO2 21*   *   BUN 22   CREATININE 0.9   ALKPHOS 93   ALT 29   AST 80*   BILITOT 2.2*       Significant Diagnostics:  I have reviewed all pertinent imaging results/findings within the past 24 hours.

## 2024-05-17 NOTE — NURSING
MICU DAILY GOALS     Family/Goals of care/Code Status   Code Status: Full Code    24H Vital Sign Range  Temp:  [97.4 °F (36.3 °C)-100 °F (37.8 °C)]   Pulse:  []   Resp:  [12-52]   BP: (100-159)/(53-83)   SpO2:  [87 %-100 %]      Shift Events (include procedures and significant events)   No acute events throughout shift    AWAKE RASS: Goal - RASS Goal: 0-->alert and calm  Actual - RASS ([place] Agitation-Sedation Scale): very agitated    Restraint necessity: Not necessary   BREATHE SBT: Not intubated    Coordinate A & B, analgesics/sedatives Pain: managed   SAT: Not intubated   Delirium CAM-ICU: Overall CAM-ICU: Negative   Early(intubated/ Progressive (non-intubated) Mobility MOVE Screen (INTUBATED ONLY): Not intubated    Activity: Activity Management: Rolling - L1   Feeding/Nutrition Diet order: Diet/Nutrition Received: clear liquid,     Thrombus DVT prophylaxis: VTE Required Core Measure: Per order contraindicated for SCDs/Anticoagulants   HOB Elevation Head of Bed (HOB) Positioning: HOB at 30 degrees   Ulcer Prophylaxis GI: yes   Glucose control managed     Skin Skin assessed during: Daily Assessment    Sacrum intact/not altered? Yes  Heels intact/not altered? Yes  Surgical wound? No    CHECK ONE!   (no altered skin or altered skin) and sub boxes:  [] No Altered Skin Integrity Present    []Prevention Measures Documented    [] Altered Skin Integrity Present or Discovered   [] LDA present in EPIC, daily doc completed              [] LDA added if not in EPIC (describe wound).                    When describing wound, do not stage, use descriptive words only.    [] Wound Image Taken (required on admit,                   transfer/discharge and every Tuesday)    Wound Care Consulted? No    4 EYES:  Attending Nurse (1st set of eyes):     Second RN/Staff Member (2nd set of eyes):    Bowel Function no issues    Indwelling Catheter Necessity      Urethral Catheter 05/15/24 1600-Reason for Continuing Urinary  Catheterization: Urinary retention          De-escalation Antibiotics No        VS and assessment per flow sheet, patient progressing towards goals as tolerated, plan of care reviewed with [unfilled] and family, all concerns addressed, will continue to monitor.

## 2024-05-17 NOTE — HPI
Jessica Floyd is a 73 y.o. female with a history of HTN, HFpEF, CKD3, GERD, and CAD who presented to the ED on 5/14/24 with melena. She is altered on my interview and so the history was collected from chart review and the family. Appears she was feeling light headed and as though she was going to pass out. She also noted hematochezia. Upon arrival she was AF, tachycardic, and hypotensive. CTA was concerning for GIB and so she was taken to IR where she was found to have extravasation from the ileal branch of the SMA. They performed a coil embolization of a tertiary branch. Following this, she was reportedly doing well from a mentation standpoint, but has continued to require blood transfusion. She had an EGD on 5/15 and C-scope on 5/16 neither of which revealed a source for her bleed although GI was not able to reach the cecum due to tortuosity. Over the last couple of days, she has had worsening abdominal pain and she is not able to participate with my interview due to AMS. Her family feels this is pain related although she has been received narcotics and ativan PRN.      She had a low grade fever to 100.6 earlier today. Currently tachycardic to the 100s but HDS. Her CBC is notable for WBC 40 (stable from prior) and H/H 8.6/25.5. her CMP shows a 2 bili of 2.2 but is otherwise unremarkable. Lactic acid is normal at 1.3. She had a repeat CT A/P today which showed dilated bowel consistent with an ileus. However, per her family and the primary team, her pain seems out of proportion to the findings.

## 2024-05-17 NOTE — PROGRESS NOTES
"[facility] - Cardiac Medical ICU  Critical Care Medicine  Progress Note    Patient Name: Jessica Floyd  MRN: [id number]  Admission Date: 5/14/2024  Hospital Length of Stay: 3 days  Code Status: Full Code  Attending Provider: Sachin Rich*  Primary Care Provider: Effie Olmedo MD   Principal Problem: Acute lower GI bleeding    Subjective:     HPI:  Mrs. Floyd is a 73 year old female with PMH notable for COPD on home 2L NC, pulm HTN, R carotid stent on ASA, HFpEF, anemia, and ERIK on infusions who presented to [facility] ED with the melena.  is at bedside and reports that the patient had an episode of diarrhea and lethargy yesterday. She reports that it felt as if "she was going to pass out", so she was using her walker to get around. Patient's  reports that this has never happened before. Patient denies excessive OTC NSAID use. She reports that she drinks 2 wine glasses a day. This morning when the patient woke up she felt very lethargic. Patient went to use the bathroom, and  noted bright red stool in the bowl. Patient reported a pre syncopal episode and felt lightheaded. She admits to weakness, SOB, light-headedness, hematochezia, and pre syncope. Patient denies nausea, vomiting, hematemesis, falls, confusion, chest pain, urinary changes, and fevers. Patient reports taking aspirin daily but no DOAC.     Hemoglobin 5.9 at presentation to ED (baseline ~10). GI and IR consulted given concern for active bleed seen on CTA. In the emergency department she was given 1L IVF and a 80 mg IV protonix bolus. Critical care medicine. consulted for GIB. CTA in the Ed revealed "Acute gastrointestinal bleed at the level of the cecum". IR planning for embolization today. Patient is also receiving 2 pRBC due to acute anemia.     Hospital/ICU Course:  Admitted to ICU for lower GIB s/p embolization with IR 5/14. Colonoscopy performed 5/16 without evidence of active bleed, old blood/clots evacuated. Patient is s/p " 8U pRBCs, 1 FFP, 1 platelet. Course complicated by worsening abdominal pain, general surgery consulted for further evaluation.     Interval History/Significant Events: Patient with Hg 5.8 overnight, s/p 2U pRBCs, FFP, platelets with repeat Hg 9.3. 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 CT chest without concern for new PNA but has pulmonary nodules, Ca-19 pending. CT abdomen with continued ileus and gut wall edema.    Patient continues to have abdominal pain out of proportion to exam. General surgery consulted for evaluation. NGT placed to suction.     Review of Systems   Constitutional:  Negative for chills and fever.   HENT:  Negative for nosebleeds, rhinorrhea, sore throat and trouble swallowing.    Eyes:  Negative for visual disturbance.   Respiratory:  Positive for shortness of breath. Negative for cough and wheezing.    Cardiovascular:  Negative for chest pain and leg swelling.   Gastrointestinal:  Positive for abdominal distention and abdominal pain.   Endocrine: Positive for cold intolerance.   Genitourinary:  Negative for dysuria, hematuria and pelvic pain.   Musculoskeletal:  Negative for back pain.   Skin:  Positive for pallor.   Neurological:  Positive for syncope, weakness and light-headedness.   Psychiatric/Behavioral:  Negative for confusion and decreased concentration.      Objective:     Vital Signs (Most Recent):  Temp: (!) 100.6 °F (38.1 °C) (05/17/24 1100)  Pulse: 98 (05/17/24 1132)  Resp: (!) 26 (05/17/24 1235)  BP: (!) 122/58 (05/17/24 1100)  SpO2: 97 % (05/17/24 1100) Vital Signs (24h Range):  Temp:  [98.1 °F (36.7 °C)-100.6 °F (38.1 °C)] 100.6 °F (38.1 °C)  Pulse:  [] 98  Resp:  [10-45] 26  SpO2:  [87 %-100 %] 97 %  BP: (100-163)/(55-91) 122/58   Weight: 52.6 kg (115 lb 15.4 oz)  Body mass index is 20.54 kg/m².      Intake/Output Summary (Last 24 hours) at 5/17/2024 1429  Last data filed at 5/17/2024 1228  Gross per 24 hour    Intake 47084.65 ml   Output 1425 ml   Net 11449.65 ml          Physical Exam  Vitals and nursing note reviewed.   Constitutional:       General: She is not in acute distress.     Comments: Anxious   HENT:      Head: Normocephalic and atraumatic.      Mouth/Throat:      Mouth: Mucous membranes are dry.      Pharynx: Oropharynx is clear.   Eyes:      General: No scleral icterus.     Extraocular Movements: Extraocular movements intact.      Conjunctiva/sclera: Conjunctivae normal.   Cardiovascular:      Rate and Rhythm: Normal rate and regular rhythm.      Heart sounds: Normal heart sounds. No murmur heard.  Pulmonary:      Effort: Pulmonary effort is normal. No respiratory distress.   Abdominal:      General: Abdomen is flat. There is no distension.      Palpations: Abdomen is soft.      Tenderness: There is abdominal tenderness. There is no guarding or rebound.   Musculoskeletal:         General: No tenderness.      Cervical back: Normal range of motion and neck supple.      Right lower leg: No edema.      Left lower leg: No edema.   Skin:     General: Skin is warm and dry.      Coloration: Skin is pale. Skin is not jaundiced.   Neurological:      General: No focal deficit present.      Mental Status: She is alert and oriented to person, place, and time. Mental status is at baseline.            Vents:  Oxygen Concentration (%): 35 (05/17/24 0442)  Lines/Drains/Airways       Drain  Duration                  Urethral Catheter 05/15/24 1600 1 day         NG/OG Tube 05/17/24 0930 16 Fr. Right nostril <1 day              Peripheral Intravenous Line  Duration                  Peripheral IV - Single Lumen 05/16/24 1846 20 G Anterior;Right Upper Arm <1 day         Peripheral IV - Single Lumen 05/17/24 0800 20 G Distal;Left;Posterior Forearm <1 day         Peripheral IV - Single Lumen 05/17/24 1112 20 G Posterior;Right Forearm <1 day                  Significant Labs:    CBC/Anemia Profile:  Recent Labs   Lab 05/17/24  0208  05/17/24 0423 05/17/24 0442 05/17/24  1206   WBC 39.01* 41.09*  --  39.95*   HGB 8.4* 9.3*  --  8.6*   HCT 23.8* 27.5* 26* 25.5*    226  --  210   MCV 87 89  --  90   RDW 15.7* 15.9*  --  16.8*        Chemistries:  Recent Labs   Lab 05/16/24 0408 05/16/24 2047 05/17/24  0423   * 144 146*   K 2.6* 3.3* 3.7    110 111*   CO2 23 22* 21*   BUN 25* 25* 22   CREATININE 0.9 0.9 0.9   CALCIUM 7.3* 7.2* 7.6*   ALBUMIN 2.0* 1.9* 2.4*   PROT 4.3* 4.3* 5.3*   BILITOT 1.5* 0.9 2.2*   ALKPHOS 55 75 93   ALT 12 20 29   AST 25 54* 80*   MG 2.1  --  1.9   PHOS 3.6  --  3.4       CMP:   Recent Labs   Lab 05/16/24 0408 05/16/24 2047 05/17/24 0423   * 144 146*   K 2.6* 3.3* 3.7    110 111*   CO2 23 22* 21*   * 127* 105   BUN 25* 25* 22   CREATININE 0.9 0.9 0.9   CALCIUM 7.3* 7.2* 7.6*   PROT 4.3* 4.3* 5.3*   ALBUMIN 2.0* 1.9* 2.4*   BILITOT 1.5* 0.9 2.2*   ALKPHOS 55 75 93   AST 25 54* 80*   ALT 12 20 29   ANIONGAP 13 12 14       Significant Imaging:  I have reviewed all pertinent imaging results/findings within the past 24 hours.    ABG  Recent Labs   Lab 05/17/24 0442   PH 7.334*   PO2 23*   PCO2 56.8*   HCO3 30.2*   BE 4*     Assessment/Plan:     Pulmonary  Pulmonary emphysema  Not on oxygen at home.     -Home inhalers held in the setting of worsening ileus, resume when appropriate   -Misha PRN    Cardiac/Vascular  (HFpEF) heart failure with preserved ejection fraction  Noted in history but last echo reveals normal function. Not on home medications.    Transthoracic echo (TTE) complete 10/29/2023    Interpretation Summary    Left Ventricle: The left ventricle is normal in size. Normal wall thickness. Normal wall motion. There is normal systolic function with a visually estimated ejection fraction of 60 - 65%. There is normal diastolic function.    Right Ventricle: Mild right ventricular enlargement. Wall thickness is normal. Right ventricle wall motion  is normal. Systolic function is  normal.    Aortic Valve: The aortic valve is a unicuspid valve. There is mild aortic valve sclerosis. There is trace aortic regurgitation.    Mitral Valve: There is mild regurgitation.    Pulmonary Artery: There is mild pulmonary hypertension. The estimated pulmonary artery systolic pressure is 40 mmHg.    IVC/SVC: Intermediate venous pressure at 8 mmHg.        Hypertension  Hypertension Medications                    amLODIPine (NORVASC) 5 MG tablet TAKE 1 TABLET BY MOUTH EVERY DAY     carvediloL (COREG) 6.25 MG tablet Take 1 tablet (6.25 mg total) by mouth 2 (two) times daily with meals.     hydroCHLOROthiazide (HYDRODIURIL) 25 MG tablet Take 1 tablet (25 mg total) by mouth once daily.       -Will hold antihypertensives in setting of acute bleed and hypotension    Bilateral carotid artery stenosis  April 2022     No evidence of hemodynamically significant stenosis is demonstrated in the extracranial carotid arteries.  Patent right carotid stent    Oncology  Leukocytosis  Leukocytosis on admission, initially thought to be acute phase reactant to GIB as patient afebrile.   - Started on IV Zosyn for concern for intraabdominal infection given worsening abdominal pain, distension  - Will follow up Bcx2    GI  * Acute lower GI bleeding  73 year old female with multiple medical problems presenting with melena and some bright red blood with associated pre-syncope found to have hgb of 5.9 (from baseline ~10). CTA with acute gastrointestinal bleed at the level of the cecum. Patient had embolization by IR but continued to to have larger volume dark red blood in stool with falling hgb, repeat CTA without active bleed.     S/p 8U pRBCs, 1 FFP, 1 platelet      -- Colonoscopy performed 5/16 with no evidence of active bleed, old blood and clots evacuated  -- IR consulted, embolization performed 5/14  -- NPO   -- Protonix 40 mg qd  -- Hold all A/C and A/P agents   -- Correct any coagulopathy with platelets and FFP for goal of  platelets > 50K and INR <2.0   -- Maintain large bore IV access   -- MAP > 65 goal   -- Maintain type and screen   -- Trend CBCs    Abdominal pain  Pain out of proportion to exam  General surgery consulted with plans for exploratory lap to look for bowel ischemia  Lactic acid WNL  Concern for possible bowel ischemia of herniated bowel at this time     Ileus  Noted on CT A/P  NGT placed to suction 5/17    Other  History Situational (ie Stress Induced) syncope ([facility] admission 12-25-23  History of syncope. Thought to be reflex mediated. Had pre-syncopal episode at home after having diarrhea but before she noticed active bleeding.           Critical secondary to Patient has a condition that poses threat to life and bodily function: GIB      Critical care was time spent personally by me on the following activities: development of treatment plan with patient or surrogate and bedside caregivers, discussions with consultants, evaluation of patient's response to treatment, examination of patient, ordering and performing treatments and interventions, ordering and review of laboratory studies, ordering and review of radiographic studies, pulse oximetry, re-evaluation of patient's condition. This critical care time did not overlap with that of any other provider or involve time for any procedures.     Alfredito Cobb MD  Critical Care Medicine  [facility] - Cardiac Medical ICU

## 2024-05-17 NOTE — ASSESSMENT & PLAN NOTE
73 year old female with multiple medical problems presenting with melena and some bright red blood with associated pre-syncope found to have hgb of 5.9 (from baseline ~10). CTA with acute gastrointestinal bleed at the level of the cecum. Patient had embolization by IR but continued to to have larger volume dark red blood in stool with falling hgb, repeat CTA without active bleed.     S/p 8U pRBCs, 1 FFP, 1 platelet      -- Colonoscopy performed 5/16 with no evidence of active bleed, old blood and clots evacuated  -- IR consulted, embolization performed 5/14  -- NPO   -- Protonix 40 mg qd  -- Hold all A/C and A/P agents   -- Correct any coagulopathy with platelets and FFP for goal of platelets > 50K and INR <2.0   -- Maintain large bore IV access   -- MAP > 65 goal   -- Maintain type and screen   -- Trend CBCs

## 2024-05-18 PROBLEM — E87.0 HYPERNATREMIA: Status: ACTIVE | Noted: 2024-05-18

## 2024-05-18 PROBLEM — K40.30 INCARCERATED INGUINAL HERNIA: Status: ACTIVE | Noted: 2024-05-18

## 2024-05-18 PROBLEM — J96.12 ACUTE HYPOXIC ON CHRONIC HYPERCAPNIC RESPIRATORY FAILURE: Status: ACTIVE | Noted: 2023-10-30

## 2024-05-18 PROBLEM — K55.9 ISCHEMIA, BOWEL: Status: ACTIVE | Noted: 2024-05-17

## 2024-05-18 LAB
ALBUMIN SERPL BCP-MCNC: 1.9 G/DL (ref 3.5–5.2)
ALLENS TEST: ABNORMAL
ALLENS TEST: ABNORMAL
ALP SERPL-CCNC: 100 U/L (ref 55–135)
ALT SERPL W/O P-5'-P-CCNC: 43 U/L (ref 10–44)
ANION GAP SERPL CALC-SCNC: 11 MMOL/L (ref 8–16)
ANISOCYTOSIS BLD QL SMEAR: ABNORMAL
AST SERPL-CCNC: 135 U/L (ref 10–40)
BASO STIPL BLD QL SMEAR: ABNORMAL
BASOPHILS # BLD AUTO: 0.02 K/UL (ref 0–0.2)
BASOPHILS # BLD AUTO: 0.02 K/UL (ref 0–0.2)
BASOPHILS # BLD AUTO: 0.04 K/UL (ref 0–0.2)
BASOPHILS # BLD AUTO: ABNORMAL K/UL (ref 0–0.2)
BASOPHILS NFR BLD: 0 % (ref 0–1.9)
BASOPHILS NFR BLD: 0.1 % (ref 0–1.9)
BILIRUB SERPL-MCNC: 1.3 MG/DL (ref 0.1–1)
BUN SERPL-MCNC: 17 MG/DL (ref 8–23)
BURR CELLS BLD QL SMEAR: ABNORMAL
CALCIUM SERPL-MCNC: 8.2 MG/DL (ref 8.7–10.5)
CHLORIDE SERPL-SCNC: 114 MMOL/L (ref 95–110)
CO2 SERPL-SCNC: 24 MMOL/L (ref 23–29)
CREAT SERPL-MCNC: 0.9 MG/DL (ref 0.5–1.4)
DELSYS: ABNORMAL
DELSYS: ABNORMAL
DIFFERENTIAL METHOD BLD: ABNORMAL
EOSINOPHIL # BLD AUTO: 0 K/UL (ref 0–0.5)
EOSINOPHIL # BLD AUTO: 0 K/UL (ref 0–0.5)
EOSINOPHIL # BLD AUTO: 0.2 K/UL (ref 0–0.5)
EOSINOPHIL # BLD AUTO: ABNORMAL K/UL (ref 0–0.5)
EOSINOPHIL NFR BLD: 0 % (ref 0–8)
EOSINOPHIL NFR BLD: 0.1 % (ref 0–8)
EOSINOPHIL NFR BLD: 0.6 % (ref 0–8)
EOSINOPHIL NFR BLD: 1 % (ref 0–8)
EP: 5
ERYTHROCYTE [DISTWIDTH] IN BLOOD BY AUTOMATED COUNT: 16.5 % (ref 11.5–14.5)
ERYTHROCYTE [DISTWIDTH] IN BLOOD BY AUTOMATED COUNT: 17.2 % (ref 11.5–14.5)
ERYTHROCYTE [DISTWIDTH] IN BLOOD BY AUTOMATED COUNT: 17.6 % (ref 11.5–14.5)
ERYTHROCYTE [DISTWIDTH] IN BLOOD BY AUTOMATED COUNT: 17.9 % (ref 11.5–14.5)
EST. GFR  (NO RACE VARIABLE): >60 ML/MIN/1.73 M^2
GIANT PLATELETS BLD QL SMEAR: PRESENT
GLUCOSE SERPL-MCNC: 99 MG/DL (ref 70–110)
HCO3 UR-SCNC: 28.2 MMOL/L (ref 24–28)
HCO3 UR-SCNC: 30.2 MMOL/L (ref 24–28)
HCT VFR BLD AUTO: 26.2 % (ref 37–48.5)
HCT VFR BLD AUTO: 27.4 % (ref 37–48.5)
HCT VFR BLD AUTO: 28.5 % (ref 37–48.5)
HCT VFR BLD AUTO: 30.3 % (ref 37–48.5)
HCT VFR BLD CALC: 25 %PCV (ref 36–54)
HCT VFR BLD CALC: 26 %PCV (ref 36–54)
HGB BLD-MCNC: 10 G/DL (ref 12–16)
HGB BLD-MCNC: 8.7 G/DL (ref 12–16)
HGB BLD-MCNC: 9.2 G/DL (ref 12–16)
HGB BLD-MCNC: 9.7 G/DL (ref 12–16)
HYPOCHROMIA BLD QL SMEAR: ABNORMAL
IMM GRANULOCYTES # BLD AUTO: 1.34 K/UL (ref 0–0.04)
IMM GRANULOCYTES # BLD AUTO: 1.45 K/UL (ref 0–0.04)
IMM GRANULOCYTES # BLD AUTO: 1.72 K/UL (ref 0–0.04)
IMM GRANULOCYTES # BLD AUTO: ABNORMAL K/UL (ref 0–0.04)
IMM GRANULOCYTES NFR BLD AUTO: 4.3 % (ref 0–0.5)
IMM GRANULOCYTES NFR BLD AUTO: 4.5 % (ref 0–0.5)
IMM GRANULOCYTES NFR BLD AUTO: 4.7 % (ref 0–0.5)
IMM GRANULOCYTES NFR BLD AUTO: ABNORMAL % (ref 0–0.5)
INR PPP: 1 (ref 0.8–1.2)
IP: 12
LYMPHOCYTES # BLD AUTO: 0.7 K/UL (ref 1–4.8)
LYMPHOCYTES # BLD AUTO: 0.9 K/UL (ref 1–4.8)
LYMPHOCYTES # BLD AUTO: 1.2 K/UL (ref 1–4.8)
LYMPHOCYTES # BLD AUTO: ABNORMAL K/UL (ref 1–4.8)
LYMPHOCYTES NFR BLD: 2.4 % (ref 18–48)
LYMPHOCYTES NFR BLD: 2.9 % (ref 18–48)
LYMPHOCYTES NFR BLD: 3 % (ref 18–48)
LYMPHOCYTES NFR BLD: 5 % (ref 18–48)
MAGNESIUM SERPL-MCNC: 2.3 MG/DL (ref 1.6–2.6)
MCH RBC QN AUTO: 29.9 PG (ref 27–31)
MCH RBC QN AUTO: 30.3 PG (ref 27–31)
MCH RBC QN AUTO: 30.3 PG (ref 27–31)
MCH RBC QN AUTO: 30.8 PG (ref 27–31)
MCHC RBC AUTO-ENTMCNC: 33 G/DL (ref 32–36)
MCHC RBC AUTO-ENTMCNC: 33.2 G/DL (ref 32–36)
MCHC RBC AUTO-ENTMCNC: 33.6 G/DL (ref 32–36)
MCHC RBC AUTO-ENTMCNC: 34 G/DL (ref 32–36)
MCV RBC AUTO: 89 FL (ref 82–98)
MCV RBC AUTO: 90 FL (ref 82–98)
MCV RBC AUTO: 91 FL (ref 82–98)
MCV RBC AUTO: 92 FL (ref 82–98)
MODE: ABNORMAL
MONOCYTES # BLD AUTO: 2.5 K/UL (ref 0.3–1)
MONOCYTES # BLD AUTO: 3.1 K/UL (ref 0.3–1)
MONOCYTES # BLD AUTO: 3.5 K/UL (ref 0.3–1)
MONOCYTES # BLD AUTO: ABNORMAL K/UL (ref 0.3–1)
MONOCYTES NFR BLD: 8 % (ref 4–15)
MONOCYTES NFR BLD: 8.8 % (ref 4–15)
MONOCYTES NFR BLD: 8.8 % (ref 4–15)
MONOCYTES NFR BLD: 9.6 % (ref 4–15)
NEUTROPHILS # BLD AUTO: 23.6 K/UL (ref 1.8–7.7)
NEUTROPHILS # BLD AUTO: 26.5 K/UL (ref 1.8–7.7)
NEUTROPHILS # BLD AUTO: 33.5 K/UL (ref 1.8–7.7)
NEUTROPHILS NFR BLD: 82.8 % (ref 38–73)
NEUTROPHILS NFR BLD: 83.4 % (ref 38–73)
NEUTROPHILS NFR BLD: 83.8 % (ref 38–73)
NEUTROPHILS NFR BLD: 86 % (ref 38–73)
NRBC BLD-RTO: 1 /100 WBC
OVALOCYTES BLD QL SMEAR: ABNORMAL
PCO2 BLDA: 51.4 MMHG (ref 35–45)
PCO2 BLDA: 61.6 MMHG (ref 35–45)
PH SMN: 7.3 [PH] (ref 7.35–7.45)
PH SMN: 7.35 [PH] (ref 7.35–7.45)
PHOSPHATE SERPL-MCNC: 4.7 MG/DL (ref 2.7–4.5)
PLATELET # BLD AUTO: 175 K/UL (ref 150–450)
PLATELET # BLD AUTO: 176 K/UL (ref 150–450)
PLATELET # BLD AUTO: 194 K/UL (ref 150–450)
PLATELET # BLD AUTO: 204 K/UL (ref 150–450)
PLATELET BLD QL SMEAR: ABNORMAL
PMV BLD AUTO: 10 FL (ref 9.2–12.9)
PMV BLD AUTO: 10 FL (ref 9.2–12.9)
PMV BLD AUTO: 10.2 FL (ref 9.2–12.9)
PMV BLD AUTO: 10.3 FL (ref 9.2–12.9)
PO2 BLDA: 60 MMHG (ref 80–100)
PO2 BLDA: 69 MMHG (ref 80–100)
POC BE: 3 MMOL/L
POC BE: 4 MMOL/L
POC IONIZED CALCIUM: 1.13 MMOL/L (ref 1.06–1.42)
POC IONIZED CALCIUM: 1.14 MMOL/L (ref 1.06–1.42)
POC SATURATED O2: 87 % (ref 95–100)
POC SATURATED O2: 92 % (ref 95–100)
POC TCO2: 30 MMOL/L (ref 23–27)
POC TCO2: 32 MMOL/L (ref 23–27)
POCT GLUCOSE: 106 MG/DL (ref 70–110)
POIKILOCYTOSIS BLD QL SMEAR: SLIGHT
POLYCHROMASIA BLD QL SMEAR: ABNORMAL
POTASSIUM BLD-SCNC: 3.7 MMOL/L (ref 3.5–5.1)
POTASSIUM BLD-SCNC: 4.2 MMOL/L (ref 3.5–5.1)
POTASSIUM SERPL-SCNC: 3.5 MMOL/L (ref 3.5–5.1)
PROT SERPL-MCNC: 4.8 G/DL (ref 6–8.4)
PROTHROMBIN TIME: 10.9 SEC (ref 9–12.5)
RBC # BLD AUTO: 2.87 M/UL (ref 4–5.4)
RBC # BLD AUTO: 2.99 M/UL (ref 4–5.4)
RBC # BLD AUTO: 3.2 M/UL (ref 4–5.4)
RBC # BLD AUTO: 3.35 M/UL (ref 4–5.4)
SAMPLE: ABNORMAL
SAMPLE: ABNORMAL
SCHISTOCYTES BLD QL SMEAR: ABNORMAL
SITE: ABNORMAL
SITE: ABNORMAL
SODIUM BLD-SCNC: 149 MMOL/L (ref 136–145)
SODIUM BLD-SCNC: 151 MMOL/L (ref 136–145)
SODIUM SERPL-SCNC: 149 MMOL/L (ref 136–145)
TARGETS BLD QL SMEAR: ABNORMAL
TOXIC GRANULES BLD QL SMEAR: PRESENT
WBC # BLD AUTO: 28.31 K/UL (ref 3.9–12.7)
WBC # BLD AUTO: 32.07 K/UL (ref 3.9–12.7)
WBC # BLD AUTO: 39.83 K/UL (ref 3.9–12.7)
WBC # BLD AUTO: 39.93 K/UL (ref 3.9–12.7)

## 2024-05-18 PROCEDURE — 80053 COMPREHEN METABOLIC PANEL: CPT | Mod: NTX

## 2024-05-18 PROCEDURE — 63600175 PHARM REV CODE 636 W HCPCS: Mod: NTX

## 2024-05-18 PROCEDURE — 83735 ASSAY OF MAGNESIUM: CPT | Mod: NTX

## 2024-05-18 PROCEDURE — 85027 COMPLETE CBC AUTOMATED: CPT | Mod: NTX

## 2024-05-18 PROCEDURE — 5A09357 ASSISTANCE WITH RESPIRATORY VENTILATION, LESS THAN 24 CONSECUTIVE HOURS, CONTINUOUS POSITIVE AIRWAY PRESSURE: ICD-10-PCS | Performed by: INTERNAL MEDICINE

## 2024-05-18 PROCEDURE — 99900035 HC TECH TIME PER 15 MIN (STAT): Mod: NTX

## 2024-05-18 PROCEDURE — 63600175 PHARM REV CODE 636 W HCPCS: Mod: NTX | Performed by: INTERNAL MEDICINE

## 2024-05-18 PROCEDURE — 94761 N-INVAS EAR/PLS OXIMETRY MLT: CPT | Mod: NTX

## 2024-05-18 PROCEDURE — 27000190 HC CPAP FULL FACE MASK W/VALVE: Mod: NTX

## 2024-05-18 PROCEDURE — 85610 PROTHROMBIN TIME: CPT | Mod: NTX

## 2024-05-18 PROCEDURE — 84100 ASSAY OF PHOSPHORUS: CPT | Mod: NTX

## 2024-05-18 PROCEDURE — 94660 CPAP INITIATION&MGMT: CPT | Mod: NTX

## 2024-05-18 PROCEDURE — 85025 COMPLETE CBC W/AUTO DIFF WBC: CPT | Mod: 91,NTX

## 2024-05-18 PROCEDURE — 27000221 HC OXYGEN, UP TO 24 HOURS: Mod: NTX

## 2024-05-18 PROCEDURE — 37799 UNLISTED PX VASCULAR SURGERY: CPT | Mod: NTX

## 2024-05-18 PROCEDURE — 85007 BL SMEAR W/DIFF WBC COUNT: CPT | Mod: NTX

## 2024-05-18 PROCEDURE — 25000003 PHARM REV CODE 250: Mod: NTX

## 2024-05-18 PROCEDURE — 20000000 HC ICU ROOM: Mod: NTX

## 2024-05-18 PROCEDURE — 99291 CRITICAL CARE FIRST HOUR: CPT | Mod: GC,NTX,, | Performed by: INTERNAL MEDICINE

## 2024-05-18 PROCEDURE — 82803 BLOOD GASES ANY COMBINATION: CPT | Mod: NTX

## 2024-05-18 PROCEDURE — C9113 INJ PANTOPRAZOLE SODIUM, VIA: HCPCS | Mod: NTX | Performed by: INTERNAL MEDICINE

## 2024-05-18 PROCEDURE — 27100171 HC OXYGEN HIGH FLOW UP TO 24 HOURS: Mod: NTX

## 2024-05-18 RX ORDER — ACETAMINOPHEN 10 MG/ML
1000 INJECTION, SOLUTION INTRAVENOUS ONCE
Status: COMPLETED | OUTPATIENT
Start: 2024-05-18 | End: 2024-05-18

## 2024-05-18 RX ORDER — HYDROMORPHONE HYDROCHLORIDE 1 MG/ML
0.5 INJECTION, SOLUTION INTRAMUSCULAR; INTRAVENOUS; SUBCUTANEOUS EVERY 4 HOURS PRN
Status: DISCONTINUED | OUTPATIENT
Start: 2024-05-18 | End: 2024-05-18

## 2024-05-18 RX ORDER — HALOPERIDOL 5 MG/ML
5 INJECTION INTRAMUSCULAR EVERY 6 HOURS PRN
Status: DISCONTINUED | OUTPATIENT
Start: 2024-05-18 | End: 2024-05-18

## 2024-05-18 RX ORDER — POTASSIUM CHLORIDE 7.45 MG/ML
10 INJECTION INTRAVENOUS
Status: COMPLETED | OUTPATIENT
Start: 2024-05-18 | End: 2024-05-18

## 2024-05-18 RX ORDER — HALOPERIDOL 5 MG/ML
2.5 INJECTION INTRAMUSCULAR ONCE
Status: DISCONTINUED | OUTPATIENT
Start: 2024-05-18 | End: 2024-05-18

## 2024-05-18 RX ORDER — NALOXONE HCL 0.4 MG/ML
VIAL (ML) INJECTION
Status: DISCONTINUED
Start: 2024-05-18 | End: 2024-05-18 | Stop reason: WASHOUT

## 2024-05-18 RX ORDER — HALOPERIDOL 5 MG/ML
2.5 INJECTION INTRAMUSCULAR ONCE
Status: COMPLETED | OUTPATIENT
Start: 2024-05-18 | End: 2024-05-18

## 2024-05-18 RX ORDER — DEXMEDETOMIDINE HYDROCHLORIDE 4 UG/ML
INJECTION, SOLUTION INTRAVENOUS
Status: DISPENSED
Start: 2024-05-18 | End: 2024-05-19

## 2024-05-18 RX ADMIN — HYDROMORPHONE HYDROCHLORIDE 0.5 MG: 1 INJECTION, SOLUTION INTRAMUSCULAR; INTRAVENOUS; SUBCUTANEOUS at 02:05

## 2024-05-18 RX ADMIN — ACETAMINOPHEN 1000 MG: 10 INJECTION, SOLUTION INTRAVENOUS at 06:05

## 2024-05-18 RX ADMIN — PIPERACILLIN SODIUM AND TAZOBACTAM SODIUM 4.5 G: 4; .5 INJECTION, POWDER, FOR SOLUTION INTRAVENOUS at 08:05

## 2024-05-18 RX ADMIN — DEXMEDETOMIDINE HYDROCHLORIDE 0.2 MCG/KG/HR: 4 INJECTION INTRAVENOUS at 08:05

## 2024-05-18 RX ADMIN — POTASSIUM CHLORIDE 10 MEQ: 7.46 INJECTION, SOLUTION INTRAVENOUS at 12:05

## 2024-05-18 RX ADMIN — HYDROMORPHONE HYDROCHLORIDE 0.5 MG: 1 INJECTION, SOLUTION INTRAMUSCULAR; INTRAVENOUS; SUBCUTANEOUS at 08:05

## 2024-05-18 RX ADMIN — HYDROMORPHONE HYDROCHLORIDE 0.5 MG: 1 INJECTION, SOLUTION INTRAMUSCULAR; INTRAVENOUS; SUBCUTANEOUS at 11:05

## 2024-05-18 RX ADMIN — POTASSIUM CHLORIDE 10 MEQ: 7.46 INJECTION, SOLUTION INTRAVENOUS at 10:05

## 2024-05-18 RX ADMIN — PIPERACILLIN SODIUM AND TAZOBACTAM SODIUM 4.5 G: 4; .5 INJECTION, POWDER, FOR SOLUTION INTRAVENOUS at 03:05

## 2024-05-18 RX ADMIN — LORAZEPAM 0.5 MG: 2 INJECTION INTRAMUSCULAR; INTRAVENOUS at 06:05

## 2024-05-18 RX ADMIN — PANTOPRAZOLE SODIUM 40 MG: 40 INJECTION, POWDER, FOR SOLUTION INTRAVENOUS at 10:05

## 2024-05-18 RX ADMIN — SODIUM CHLORIDE, POTASSIUM CHLORIDE, SODIUM LACTATE AND CALCIUM CHLORIDE 1000 ML: 600; 310; 30; 20 INJECTION, SOLUTION INTRAVENOUS at 09:05

## 2024-05-18 RX ADMIN — ACETAMINOPHEN 1000 MG: 10 INJECTION, SOLUTION INTRAVENOUS at 08:05

## 2024-05-18 RX ADMIN — POTASSIUM CHLORIDE 10 MEQ: 7.46 INJECTION, SOLUTION INTRAVENOUS at 03:05

## 2024-05-18 RX ADMIN — POTASSIUM CHLORIDE 10 MEQ: 7.46 INJECTION, SOLUTION INTRAVENOUS at 02:05

## 2024-05-18 RX ADMIN — HALOPERIDOL LACTATE 2.5 MG: 5 INJECTION, SOLUTION INTRAMUSCULAR at 02:05

## 2024-05-18 RX ADMIN — PIPERACILLIN SODIUM AND TAZOBACTAM SODIUM 4.5 G: 4; .5 INJECTION, POWDER, FOR SOLUTION INTRAVENOUS at 11:05

## 2024-05-18 RX ADMIN — HYDROMORPHONE HYDROCHLORIDE 0.5 MG: 1 INJECTION, SOLUTION INTRAMUSCULAR; INTRAVENOUS; SUBCUTANEOUS at 06:05

## 2024-05-18 RX ADMIN — METHOCARBAMOL 250 MG: 100 INJECTION, SOLUTION INTRAMUSCULAR; INTRAVENOUS at 06:05

## 2024-05-18 RX ADMIN — POTASSIUM CHLORIDE 10 MEQ: 7.46 INJECTION, SOLUTION INTRAVENOUS at 01:05

## 2024-05-18 NOTE — ASSESSMENT & PLAN NOTE
Leukocytosis on admission, initially thought to be acute phase reactant to GIB as patient afebrile.   - Started on IV Zosyn for concern for intraabdominal infection given worsening abdominal pain, distension  - Bcx2 NGTD

## 2024-05-18 NOTE — PROGRESS NOTES
[facility] - Cardiac Medical ICU  General Surgery  Progress Note    Subjective:     History of Present Illness:  Jessica Floyd is a 73 y.o. female with a history of HTN, HFpEF, CKD3, GERD, and CAD who presented to the ED on 5/14/24 with melena. She is altered on my interview and so the history was collected from chart review and the family. Appears she was feeling light headed and as though she was going to pass out. She also noted hematochezia. Upon arrival she was AF, tachycardic, and hypotensive. CTA was concerning for GIB and so she was taken to IR where she was found to have extravasation from the ileal branch of the SMA. They performed a coil embolization of a tertiary branch. Following this, she was reportedly doing well from a mentation standpoint, but has continued to require blood transfusion. She had an EGD on 5/15 and C-scope on 5/16 neither of which revealed a source for her bleed although GI was not able to reach the cecum due to tortuosity. Over the last couple of days, she has had worsening abdominal pain and she is not able to participate with my interview due to AMS. Her family feels this is pain related although she has been received narcotics and ativan PRN.      She had a low grade fever to 100.6 earlier today. Currently tachycardic to the 100s but HDS. Her CBC is notable for WBC 40 (stable from prior) and H/H 8.6/25.5. her CMP shows a 2 bili of 2.2 but is otherwise unremarkable. Lactic acid is normal at 1.3. She had a repeat CT A/P today which showed dilated bowel consistent with an ileus. However, per her family and the primary team, her pain seems out of proportion to the findings.     Post-Op Info:  Procedure(s) (LRB):  LAPAROSCOPY, DIAGNOSTIC (N/A)  LAPAROTOMY, EXPLORATORY (N/A)  EXCISION, SMALL INTESTINE (N/A)  REPAIR, HERNIA, INGUINAL (Bilateral)   1 Day Post-Op     Interval History: Taken to the OR last night and found to have ischemic gut within left inguinal hernia. Resection performed  and tissue repair of bilateral hernias performed. Has remained AF and HDS since then. Persistently elevated WBC at 40. Minimal output from NGT    Medications:  Continuous Infusions:  Scheduled Meds:   LIDOcaine  1 patch Transdermal Q24H    pantoprazole  40 mg Intravenous Daily    piperacillin-tazobactam (Zosyn) IV (PEDS and ADULTS) (extended infusion is not appropriate)  4.5 g Intravenous Q8H    sodium chloride  1 spray Each Nostril Daily     PRN Meds:  Current Facility-Administered Medications:     albuterol-ipratropium, 3 mL, Nebulization, Q6H PRN    HYDROmorphone, 0.5 mg, Intravenous, Q2H PRN    lorazepam, 0.5 mg, Intravenous, Q4H PRN    ondansetron, 4 mg, Intravenous, Q4H PRN    sodium chloride 0.9%, 10 mL, Intravenous, PRN     Review of patient's allergies indicates:  No Known Allergies  Objective:     Vital Signs (Most Recent):  Temp: 98.2 °F (36.8 °C) (05/18/24 0301)  Pulse: 94 (05/18/24 0700)  Resp: (!) 58 (05/18/24 0700)  BP: (!) 149/67 (05/17/24 1400)  SpO2: 95 % (05/18/24 0700) Vital Signs (24h Range):  Temp:  [98.2 °F (36.8 °C)-100.6 °F (38.1 °C)] 98.2 °F (36.8 °C)  Pulse:  [] 94  Resp:  [10-64] 58  SpO2:  [90 %-100 %] 95 %  BP: (115-154)/(58-76) 149/67  Arterial Line BP: ()/(48-70) 127/58     Weight: 52.6 kg (115 lb 15.4 oz)  Body mass index is 20.54 kg/m².    Intake/Output - Last 3 Shifts         05/16 0700 05/17 0659 05/17 0700 05/18 0659 05/18 0700 05/19 0659    P.O.       I.V. (mL/kg) 34.3 (0.7) 48 (0.9)     Blood 720.6 07575.8     IV Piggyback 1150 1933     Total Intake(mL/kg) 1905 (36.2) 04751.8 (254)     Urine (mL/kg/hr) 1050 (0.8) 1900 (1.5)     Stool       Total Output 1050 1900     Net +855 +83278.8                     Physical Exam  Vitals and nursing note reviewed.   Constitutional:       Appearance: She is well-developed. She is not diaphoretic.      Interventions: Nasal cannula in place.      Comments: Appears uncomfortable but improved from prior   HENT:      Nose:       Comments: NGT in place     Mouth/Throat:      Pharynx: Oropharynx is clear. No oropharyngeal exudate.   Eyes:      General: No scleral icterus.     Extraocular Movements: Extraocular movements intact.   Cardiovascular:      Rate and Rhythm: Normal rate and regular rhythm.   Pulmonary:      Effort: Pulmonary effort is normal. No respiratory distress.   Abdominal:      Comments: Midline incision with dressing in place. No strike through.  Tenderness significantly improved from prior to surgery   Genitourinary:     Comments: Maldonado in place  Musculoskeletal:         General: No deformity. Normal range of motion.   Skin:     General: Skin is warm and dry.      Coloration: Skin is not jaundiced.   Neurological:      General: No focal deficit present.      Cranial Nerves: No cranial nerve deficit.   Psychiatric:      Comments: Unable to assess          Significant Labs:  I have reviewed all pertinent lab results within the past 24 hours.  CBC:   Recent Labs   Lab 05/18/24 0453   WBC 39.93*   RBC 3.35*   HGB 10.0*   HCT 30.3*      MCV 90   MCH 29.9   MCHC 33.0     CMP:   Recent Labs   Lab 05/18/24 0453   GLU 99   CALCIUM 8.2*   ALBUMIN 1.9*   PROT 4.8*   *   K 3.5   CO2 24   *   BUN 17   CREATININE 0.9   ALKPHOS 100   ALT 43   *   BILITOT 1.3*       Significant Diagnostics:  I have reviewed all pertinent imaging results/findings within the past 24 hours.  Assessment/Plan:     * Acute lower GI bleeding  Jessica Floyd is a 73 y.o. female who presented with a GIB s/p IR embolization of a tertiary branch of the ileal artery now with severe LLQ pain. She is now s/p diagnostic laparotomy converted to ex lap with small bowel resection and primary tissue repair of bilateral inguinal hernias.     - NPO  - mIVF  - NGT to LIWS. Okay for meds.  - Would hold off on enteral nutrition until has evidence of ROBF  - Abx per primary  - Okay for PT/OT. Would recommend early mobilization as tolerated  - Remainder of  care per MICU        Amber Jay MD  General Surgery  [facility] - Cardiac Medical ICU

## 2024-05-18 NOTE — ANESTHESIA PROCEDURE NOTES
Arterial    Diagnosis: GIB    Patient location during procedure: done in OR    Staffing  Authorizing Provider: Tracy Rahman MD  Performing Provider: Tracy Rahman MD    Staffing  Performed by: Tracy Rahman MD  Authorized by: Tracy Rahman MD    Anesthesiologist was present at the time of the procedure.    Preanesthetic Checklist  Completed: patient identified, IV checked, site marked, risks and benefits discussed, surgical consent, monitors and equipment checked, pre-op evaluation, timeout performed and anesthesia consent givenArterial  Skin Prep: chlorhexidine gluconate  Local Infiltration: none  Orientation: left  Location: radial    Catheter Size: 20 G  Catheter placement by Ultrasound guidance. Heme positive aspiration all ports.   Vessel Caliber: medium, patent, compressibility normal  Vascular Doppler:  not done  Needle advanced into vessel with real time Ultrasound guidance.Insertion Attempts: 1

## 2024-05-18 NOTE — PLAN OF CARE
MICU DAILY GOALS     Family/Goals of care/Code Status   Code Status: Full Code    24H Vital Sign Range  Temp:  [98.2 °F (36.8 °C)-99.2 °F (37.3 °C)]   Pulse:  []   Resp:  [11-64]   BP: (146)/(62)   SpO2:  [88 %-100 %]   Arterial Line BP: ()/(47-79)      Shift Events (include procedures and significant events)   No acute events throughout shift    AWAKE RASS: Goal - RASS Goal: 0-->alert and calm  Actual - RASS ([place] Agitation-Sedation Scale): agitated    Restraint necessity: Not necessary   BREATHE SBT: Not intubated    Coordinate A & B, analgesics/sedatives Pain: uncontrolled   SAT: Not intubated   Delirium CAM-ICU: Overall CAM-ICU: Positive   Early(intubated/ Progressive (non-intubated) Mobility MOVE Screen (INTUBATED ONLY): Not intubated    Activity: Activity Management: Rolling - L1   Feeding/Nutrition Diet order: Diet/Nutrition Received: NPO,     Thrombus DVT prophylaxis: VTE Required Core Measure: Per order contraindicated for SCDs/Anticoagulants   HOB Elevation Head of Bed (HOB) Positioning: HOB at 30-45 degrees   Ulcer Prophylaxis GI: yes   Glucose control managed     Skin Skin assessed during: Q Shift Change    Sacrum intact/not altered? No  Heels intact/not altered? Yes  Surgical wound? Yes    CHECK ONE!   (no altered skin or altered skin) and sub boxes:  [] No Altered Skin Integrity Present    []Prevention Measures Documented    [x] Altered Skin Integrity Present or Discovered   [x] LDA present in EPIC, daily doc completed              [] LDA added if not in EPIC (describe wound).                    When describing wound, do not stage, use descriptive words only.    [] Wound Image Taken (required on admit,                   transfer/discharge and every Tuesday)    Wound Care Consulted? No    4 EYES:  Attending Nurse (1st set of eyes):     Second RN/Staff Member (2nd set of eyes):    Bowel Function no issues    Indwelling Catheter Necessity      Urethral Catheter 05/15/24 1600-Reason for  Continuing Urinary Catheterization: Critically ill in ICU and requiring hourly monitoring of intake/output          De-escalation Antibiotics Yes        VS and assessment per flow sheet, patient progressing towards goals as tolerated, plan of care reviewed with [unfilled] and family, all concerns addressed, will continue to monitor.

## 2024-05-18 NOTE — SUBJECTIVE & OBJECTIVE
Interval History: Taken to the OR last night and found to have ischemic gut within left inguinal hernia. Resection performed and tissue repair of bilateral hernias performed. Has remained AF and HDS since then. Persistently elevated WBC at 40. Minimal output from NGT    Medications:  Continuous Infusions:  Scheduled Meds:   LIDOcaine  1 patch Transdermal Q24H    pantoprazole  40 mg Intravenous Daily    piperacillin-tazobactam (Zosyn) IV (PEDS and ADULTS) (extended infusion is not appropriate)  4.5 g Intravenous Q8H    sodium chloride  1 spray Each Nostril Daily     PRN Meds:  Current Facility-Administered Medications:     albuterol-ipratropium, 3 mL, Nebulization, Q6H PRN    HYDROmorphone, 0.5 mg, Intravenous, Q2H PRN    lorazepam, 0.5 mg, Intravenous, Q4H PRN    ondansetron, 4 mg, Intravenous, Q4H PRN    sodium chloride 0.9%, 10 mL, Intravenous, PRN     Review of patient's allergies indicates:  No Known Allergies  Objective:     Vital Signs (Most Recent):  Temp: 98.2 °F (36.8 °C) (05/18/24 0301)  Pulse: 94 (05/18/24 0700)  Resp: (!) 58 (05/18/24 0700)  BP: (!) 149/67 (05/17/24 1400)  SpO2: 95 % (05/18/24 0700) Vital Signs (24h Range):  Temp:  [98.2 °F (36.8 °C)-100.6 °F (38.1 °C)] 98.2 °F (36.8 °C)  Pulse:  [] 94  Resp:  [10-64] 58  SpO2:  [90 %-100 %] 95 %  BP: (115-154)/(58-76) 149/67  Arterial Line BP: ()/(48-70) 127/58     Weight: 52.6 kg (115 lb 15.4 oz)  Body mass index is 20.54 kg/m².    Intake/Output - Last 3 Shifts         05/16 0700 05/17 0659 05/17 0700 05/18 0659 05/18 0700 05/19 0659    P.O.       I.V. (mL/kg) 34.3 (0.7) 48 (0.9)     Blood 720.6 33284.8     IV Piggyback 1150 1933     Total Intake(mL/kg) 1905 (36.2) 07082.8 (254)     Urine (mL/kg/hr) 1050 (0.8) 1900 (1.5)     Stool       Total Output 1050 1900     Net +855 +73038.8                     Physical Exam  Vitals and nursing note reviewed.   Constitutional:       Appearance: She is well-developed. She is not diaphoretic.       Interventions: Nasal cannula in place.      Comments: Appears uncomfortable but improved from prior   HENT:      Nose:      Comments: NGT in place     Mouth/Throat:      Pharynx: Oropharynx is clear. No oropharyngeal exudate.   Eyes:      General: No scleral icterus.     Extraocular Movements: Extraocular movements intact.   Cardiovascular:      Rate and Rhythm: Normal rate and regular rhythm.   Pulmonary:      Effort: Pulmonary effort is normal. No respiratory distress.   Abdominal:      Comments: Midline incision with dressing in place. No strike through.  Tenderness significantly improved from prior to surgery   Genitourinary:     Comments: Maldonado in place  Musculoskeletal:         General: No deformity. Normal range of motion.   Skin:     General: Skin is warm and dry.      Coloration: Skin is not jaundiced.   Neurological:      General: No focal deficit present.      Cranial Nerves: No cranial nerve deficit.   Psychiatric:      Comments: Unable to assess          Significant Labs:  I have reviewed all pertinent lab results within the past 24 hours.  CBC:   Recent Labs   Lab 05/18/24  0453   WBC 39.93*   RBC 3.35*   HGB 10.0*   HCT 30.3*      MCV 90   MCH 29.9   MCHC 33.0     CMP:   Recent Labs   Lab 05/18/24  0453   GLU 99   CALCIUM 8.2*   ALBUMIN 1.9*   PROT 4.8*   *   K 3.5   CO2 24   *   BUN 17   CREATININE 0.9   ALKPHOS 100   ALT 43   *   BILITOT 1.3*       Significant Diagnostics:  I have reviewed all pertinent imaging results/findings within the past 24 hours.

## 2024-05-18 NOTE — TRANSFER OF CARE
Anesthesia Transfer of Care Note    Patient: Jessica Floyd    Procedure(s) Performed: Procedure(s) (LRB):  LAPAROSCOPY, DIAGNOSTIC (N/A)  LAPAROTOMY, EXPLORATORY (N/A)  EXCISION, SMALL INTESTINE (N/A)  REPAIR, HERNIA, INGUINAL (Bilateral)    Patient location: ICU    Anesthesia Type: general    Transport from OR: Transported from OR on 6-10 L/min O2 by face mask with adequate spontaneous ventilation. Continuous ECG monitoring in transport. Continuous SpO2 monitoring in transport. Continuos invasive BP monitoring in transport    Post pain: adequate analgesia    Post assessment: no apparent anesthetic complications    Post vital signs: stable    Level of consciousness: awake    Nausea/Vomiting: no nausea/vomiting    Complications: none    Transfer of care protocol was followed      Last vitals: Visit Vitals  BP (!) 149/67 (BP Location: Left arm, Patient Position: Lying)   Pulse 107   Temp (!) 38.1 °C (100.6 °F) (Axillary)   Resp (!) 22   Wt 52.6 kg (115 lb 15.4 oz)   SpO2 96%   BMI 20.54 kg/m²

## 2024-05-18 NOTE — CARE UPDATE
General Surgery Update    S/p bowel resection for incarcerated inguinal hernia, though the same segment of bowel also had coils in the vasculature. Small bowel anastomosis created. Bilateral inguinal hernias repaired primarily without mesh given contaminated nature of case.     Continue NG to low intermittent wall suction  Multimodal pain control  Await return of bowel function before feeding  IV resuscitation  Zosyn for 4 days (5/17-5/21)  Surgery will continue to follow    Olivier Moeller MD  [facility] General Surgery PGY4

## 2024-05-18 NOTE — CONSULTS
NIAS consulted for PIV insertion in real time using ultrasound guidance.    Two PIVs were obtained after consult placed.    Patient has adequate IV access at this time.    Re consult NIAS if needed.

## 2024-05-18 NOTE — BRIEF OP NOTE
[facility] - Cardiac Medical ICU  Brief Operative Note    SUMMARY     Surgery Date: 5/17/2024     Surgeons and Role:     * Ruben Oscar MD - Primary     * Olivier Moeller MD - Resident - Assisting    Pre-op Diagnosis:  Melena [K92.1]    Post-op Diagnosis:  Post-Op Diagnosis Codes:     * Melena [K92.1]    Procedure(s) (LRB):  LAPAROSCOPY, DIAGNOSTIC (N/A)  LAPAROTOMY, EXPLORATORY (N/A)  EXCISION, SMALL INTESTINE (N/A)  REPAIR, HERNIA, INGUINAL (Bilateral)    Anesthesia: General    Implants:  * No implants in log *    Operative Findings: diagnostic laparoscopy. Incarcerated left inguinal hernia with necrotic bowel, not yet perforated. Of note, the same segment of bowel had coils in the vasculature presumably from embolization. Converted to exploratory laparotomy. Bowel resection and stapled anastomosis. Bilateral primary repair of right non-obstructed and left incarcerated inguinal hernias.     Estimated Blood Loss: 25    Estimated Blood Loss has been documented.         Specimens:   Specimen (24h ago, onward)       Start     Ordered    05/17/24 2031  Specimen to Pathology, Surgery General Surgery  Once        Comments: Pre-op Diagnosis: Melena [K92.1]Procedure(s):LAPAROSCOPY, DIAGNOSTICLAPAROTOMY, EXPLORATORYEXCISION, SMALL INTESTINEREPAIR, HERNIA, INGUINAL 1. Small bowel--permanent     References:    Click here for ordering Quick Tip   Question:  Procedure Type:  Answer:  General Surgery    05/17/24 2030                    [id number]

## 2024-05-18 NOTE — ASSESSMENT & PLAN NOTE
Jessica Floyd is a 73 y.o. female who presented with a GIB s/p IR embolization of a tertiary branch of the ileal artery now with severe LLQ pain. She is now s/p diagnostic laparotomy converted to ex lap with small bowel resection and primary tissue repair of bilateral inguinal hernias.     - NPO  - mIVF  - NGT to LIWS. Okay for meds.  - Would hold off on enteral nutrition until has evidence of ROBF  - Abx per primary  - Okay for PT/OT. Would recommend early mobilization as tolerated  - Remainder of care per MICU

## 2024-05-18 NOTE — SUBJECTIVE & OBJECTIVE
Interval History/Significant Events: S/p ex lap with incarcerated hernia repair with bowel resection. Tolerated procedure well. NGT remains to suction, treating pain with PRN dilaudid and IV tylenol.     Review of Systems   Unable to perform ROS: Mental status change     Objective:     Vital Signs (Most Recent):  Temp: 98.2 °F (36.8 °C) (05/18/24 0301)  Pulse: 94 (05/18/24 0700)  Resp: 20 (05/18/24 0835)  BP: (!) 149/67 (05/17/24 1400)  SpO2: 95 % (05/18/24 0700) Vital Signs (24h Range):  Temp:  [98.2 °F (36.8 °C)-100.6 °F (38.1 °C)] 98.2 °F (36.8 °C)  Pulse:  [] 94  Resp:  [11-64] 20  SpO2:  [90 %-100 %] 95 %  BP: (122-149)/(58-76) 149/67  Arterial Line BP: ()/(48-70) 127/58   Weight: 52.6 kg (115 lb 15.4 oz)  Body mass index is 20.54 kg/m².      Intake/Output Summary (Last 24 hours) at 5/18/2024 1007  Last data filed at 5/18/2024 0600  Gross per 24 hour   Intake 67335.78 ml   Output 1500 ml   Net 70703.78 ml          Physical Exam  Vitals and nursing note reviewed.   Constitutional:       General: She is not in acute distress.     Comments: Lethargic   HENT:      Head: Normocephalic and atraumatic.      Mouth/Throat:      Mouth: Mucous membranes are dry.      Pharynx: Oropharynx is clear.   Eyes:      General: No scleral icterus.     Extraocular Movements: Extraocular movements intact.      Conjunctiva/sclera: Conjunctivae normal.   Cardiovascular:      Rate and Rhythm: Normal rate and regular rhythm.      Heart sounds: Normal heart sounds. No murmur heard.  Pulmonary:      Effort: Pulmonary effort is normal. No respiratory distress.   Abdominal:      General: Abdomen is flat. There is no distension.      Palpations: Abdomen is soft.      Tenderness: There is abdominal tenderness. There is no guarding or rebound.      Comments: Less distension, surgical wound dressings clean and intact   Musculoskeletal:         General: No tenderness.      Cervical back: Normal range of motion and neck supple.       Right lower leg: No edema.      Left lower leg: No edema.   Skin:     General: Skin is warm and dry.      Coloration: Skin is not jaundiced or pale.   Neurological:      General: No focal deficit present.      Mental Status: She is oriented to person, place, and time. Mental status is at baseline.            Vents:  Oxygen Concentration (%): 35 (05/17/24 0442)  Lines/Drains/Airways       Drain  Duration                  Urethral Catheter 05/15/24 1600 2 days         NG/OG Tube 05/17/24 0930 16 Fr. Right nostril 1 day              Arterial Line  Duration             Arterial Line 05/17/24 1919 Left Radial <1 day              Peripheral Intravenous Line  Duration                  Peripheral IV - Single Lumen 05/16/24 1846 20 G Anterior;Right Upper Arm 1 day         Peripheral IV - Single Lumen 05/17/24 0800 20 G Distal;Left;Posterior Forearm 1 day         Peripheral IV - Single Lumen 05/17/24 1112 20 G Posterior;Right Forearm <1 day         Peripheral IV - Single Lumen 05/17/24 1845 18 G Right Hand <1 day                  Significant Labs:    CBC/Anemia Profile:  Recent Labs   Lab 05/17/24 2114 05/18/24 0054 05/18/24  0453   WBC 42.68* 39.83* 39.93*   HGB 11.0* 9.7* 10.0*   HCT 32.7* 28.5* 30.3*    194 204   MCV 90 89 90   RDW 16.9* 16.5* 17.2*        Chemistries:  Recent Labs   Lab 05/17/24 0423 05/17/24 2114 05/18/24  0453   * 147* 149*   K 3.7 4.1  4.2 3.5   * 113* 114*   CO2 21* 21* 24   BUN 22 16 17   CREATININE 0.9 0.8 0.9   CALCIUM 7.6* 8.3* 8.2*   ALBUMIN 2.4* 2.0* 1.9*   PROT 5.3* 5.3* 4.8*   BILITOT 2.2* 1.3* 1.3*   ALKPHOS 93 102 100   ALT 29 38 43   AST 80* 140* 135*   MG 1.9 2.6 2.3   PHOS 3.4  --  4.7*       CMP:   Recent Labs   Lab 05/17/24 0423 05/17/24 2114 05/18/24  0453   * 147* 149*   K 3.7 4.1  4.2 3.5   * 113* 114*   CO2 21* 21* 24    100 99   BUN 22 16 17   CREATININE 0.9 0.8 0.9   CALCIUM 7.6* 8.3* 8.2*   PROT 5.3* 5.3* 4.8*   ALBUMIN 2.4* 2.0* 1.9*    BILITOT 2.2* 1.3* 1.3*   ALKPHOS 93 102 100   AST 80* 140* 135*   ALT 29 38 43   ANIONGAP 14 13 11       Significant Imaging:  I have reviewed all pertinent imaging results/findings within the past 24 hours.

## 2024-05-18 NOTE — OP NOTE
[facility]  General Surgery  Operative Note    DATE: 05/17/2024.    PREOPERATIVE DIAGNOSIS: Acute abdomen.    POSTOPERATIVE DIAGNOSIS:   Incarcerated left inguinal hernia  Small bowel ischemia  Right inguinal hernia    PROCEDURE:   Diagnostic laparoscopy converted to open  Small bowel resection  Repair of incarcerated left inguinal hernia  Repair of right inguinal hernia    ATTENDING SURGEON: Ruben Oscar M.D.    RESIDENT: Olivier Moeller M.D. (PGY-4)    ANESTHESIA: General and local using 0.25% bupivacaine.    FINDINGS: Necrotic small bowel in incarcerated left inguinal hernia. Small bowel resection performed with primary anastomosis. Repair of inguinal defects intraperitoneally with 2-0 PDS.    INDICATION: Jessica Floyd is a 73 y.o. woman with a history of HTN, HFpEF, GERD, CAD, CKD stage 3 who has been admitted to MICU with melena. She required IR embolization given her ongoing drop in H/H when a CTA revealed a bleed near the cecum. Her abdominal pain became worse and out of proportion. CT was concerning for possible bowel ischemia. Consent was obtained from her family after the risks and benefits of the procedure were discussed with her family.     PROCEDURE IN DETAIL: The patient was seen in the ICU.  She was transferred to the Operating Room and laid supine on the OR table.  General endotracheal anesthesia was induced without any issues.  A Maldonado catheter was inserted.  She was positioned appropriately and all pressure points were padded. Her abdomen was prepped and draped in usual sterile fashion.  A time-out was performed to identify the correct patient, procedure, and that preoperative antibiotics were given.    An infraumbilical incision was made and we dissected through the soft tissues carefully since she was then.  We are able to dissect down to fascia and it was grasped.  A vertical incision was made in the fascia and we safely entered the abdomen.  A 12 mm Chadwick trocar was  placed in the abdomen was insufflated.  The angled 12 mm scope was placed and we verified that we had not cause any injuries with our initial trocar placement.  Attention was turned down in the pelvis and we immediately noted that there was incarcerated bowel in a left inguinal hernia.  We placed a 5 mm trocar in the right lateral abdomen.  A grasper was used to reduce the bowel.  Of note the bowel was moderately dilated.  We will able to reduce the bowel that was incarcerated noted that it had several areas of transmural necrosis.  The decision was made to convert to open so we could Maldonado assessed the bowel and repair the hernias.  The laparoscope was removed in the air was evacuated.  The vertical incision at the umbilicus was extended inferiorly.  Bovie electrocautery was used to dissect through fascia.  We then eviscerated the small bowel and ran it starting at the ligament of Treitz all the way to the terminal ileum.  The only areas of abnormality we noted was due to part of the bowel that was involved in the incarcerated hernia.  Blue towels were placed all around the incision and we performed a bowel resection using two blue loads of the LUISA 75 mm stapler.  The mesentery was taken with the LigaSure device.  While using the LigaSure, we noted that it would not fire one part and realized that it was because of were IR had previously embolized the vessel.  The small bowel that was resected was passed off as specimen.  The staple lines were oversewn with 3-0 Vicryl.  Next we created a side-to-side, isoperistaltic small bowel anastomosis using another blue load of the LUISA 75 mm stapler.  A ring forcep was used to make sure that both limbs of the anastomosis were patent and that the staple line was hemostatic.  The staple line was hemostatic.  The common enterotomy was closed with a running 3-0 PDS suture.  The suture line was oversewn using 3-0 Vicryl sutures.  The mesenteric defect was closed with multiple 3-0  Vicryl sutures.    We turned our attention to assess her groin hernias.  The decision was made to repair them intraperitoneally instead of making two groin incisions.  The defects were closed primarily using 2-0 PDS bilaterally.  The left inguinal hernia was larger compared to the right side.  We closed the peritoneum involving the hernias using a running 2-0 PDS suture.  We then examined the remaining bowel and the colon appeared normal were just some air in it.  The remaining small bowel was not dilated and appeared benign.  She had good palpable pulses throughout.  The gallbladder and liver appeared normal.  The abdomen was irrigated with warm saline.  Local analgesia using 0.25% bupivacaine was injected all around the incision.  The fascia was closed with a running nonlooped 1#1 PDS suture.  Skin was closed with staples.  A midline dressing was placed.  The 5 mm trocar site in the right lateral abdomen was closed with one staple.  This completed procedure.  All needles, sponges, and instrument counts were correct at the end of the case.    EBL: Minimal.    COMPLICATIONS: None.    SPECIMEN: Small bowel    DRAINS: None    DISPOSITION: ICU - extubated and hemodynamically stable.    ATTESTATION:  I was present and scrubbed for the entire procedure.

## 2024-05-18 NOTE — ANESTHESIA POSTPROCEDURE EVALUATION
Anesthesia Post Evaluation    Patient: Jessica Floyd    Procedure(s) Performed: Procedure(s) (LRB):  LAPAROSCOPY, DIAGNOSTIC (N/A)  LAPAROTOMY, EXPLORATORY (N/A)  EXCISION, SMALL INTESTINE (N/A)  REPAIR, HERNIA, INGUINAL (Bilateral)    Final Anesthesia Type: general      Patient location during evaluation: ICU  Patient participation: No - Unable to Participate, Other Reason (see comments)  Level of consciousness: lethargic  Post-procedure vital signs: reviewed and stable  Pain management: adequate  Airway patency: patent    PONV status: None or treated.  Anesthetic complications: no      Cardiovascular status: hemodynamically stable  Respiratory status: spontaneous ventilation  Hydration status: euvolemic  Follow-up not needed.          Vitals Value Taken Time   /70 05/17/24 2105   Temp 37.3 °C (99.2 °F) 05/17/24 2301   Pulse 99 05/18/24 0606   Resp 27 05/18/24 0606   SpO2 96 % 05/18/24 0606   Vitals shown include unfiled device data.      No case tracking events are documented in the log.      Pain/Paris Score: Pain Rating Prior to Med Admin: 8 (5/18/2024  2:18 AM)  Pain Rating Post Med Admin: 2 (5/18/2024  2:30 AM)

## 2024-05-18 NOTE — PROGRESS NOTES
"[facility] - Cardiac Medical ICU  Critical Care Medicine  Progress Note    Patient Name: Jessica Floyd  MRN: [id number]  Admission Date: 5/14/2024  Hospital Length of Stay: 4 days  Code Status: Full Code  Attending Provider: Sachin Rich*  Primary Care Provider: Effie Olmedo MD   Principal Problem: Acute lower GI bleeding    Subjective:     HPI:  Mrs. Floyd is a 73 year old female with PMH notable for COPD on home 2L NC, pulm HTN, R carotid stent on ASA, HFpEF, anemia, and ERIK on infusions who [place] [facility] [place].  is at bedside and reports that the patient had an episode of diarrhea and lethargy yesterday. She reports that it felt as if "she was going to pass out", so she was using her walker to get around. Patient's  reports that this has never happened before. Patient denies excessive OTC NSAID use. She reports that she drinks 2 wine glasses a day. This morning when the patient woke up she felt very lethargic. Patient went to use the bathroom, and  noted bright red stool in the bowl. Patient reported a pre syncopal episode and felt lightheaded. She admits to weakness, SOB, light-headedness, hematochezia, and pre syncope. Patient denies nausea, vomiting, hematemesis, falls, confusion, chest pain, urinary changes, and fevers. Patient reports taking aspirin daily but no DOAC.     Hemoglobin 5.9 at presentation to ED (baseline ~10). GI and IR consulted given concern for active bleed seen on CTA. In the emergency department she was given 1L IVF and a 80 mg IV protonix bolus. Critical care medicine. consulted for GIB. CTA in the Ed revealed "Acute gastrointestinal bleed at the level of the cecum". IR planning for embolization today. Patient is also receiving 2 pRBC due to acute anemia.     Hospital/ICU Course:  Admitted to ICU for lower GIB s/p embolization with IR 5/14. Colonoscopy performed 5/16 without evidence of active bleed, old blood/clots evacuated. Patient is s/p " 8U pRBCs, 1 FFP, 1 platelet. Course complicated by worsening abdominal pain, general surgery consulted for further evaluation. Patient underwent ex lap 5/17 and found to have necrotic bowel at site of incarcerated hernia. On zosyn for intraabdominal coverage, BC NGTD.     Interval History/Significant Events: S/p ex lap with incarcerated hernia repair with bowel resection. Tolerated procedure well. NGT remains to suction, treating pain with PRN dilaudid and IV tylenol.     Review of Systems   Unable to perform ROS: Mental status change     Objective:     Vital Signs (Most Recent):  Temp: 98.2 °F (36.8 °C) (05/18/24 0301)  Pulse: 94 (05/18/24 0700)  Resp: 20 (05/18/24 0835)  BP: (!) 149/67 (05/17/24 1400)  SpO2: 95 % (05/18/24 0700) Vital Signs (24h Range):  Temp:  [98.2 °F (36.8 °C)-100.6 °F (38.1 °C)] 98.2 °F (36.8 °C)  Pulse:  [] 94  Resp:  [11-64] 20  SpO2:  [90 %-100 %] 95 %  BP: (122-149)/(58-76) 149/67  Arterial Line BP: ()/(48-70) 127/58   Weight: 52.6 kg (115 lb 15.4 oz)  Body mass index is 20.54 kg/m².      Intake/Output Summary (Last 24 hours) at 5/18/2024 1007  Last data filed at 5/18/2024 0600  Gross per 24 hour   Intake 30326.78 ml   Output 1500 ml   Net 79038.78 ml          Physical Exam  Vitals and nursing note reviewed.   Constitutional:       General: She is not in acute distress.     Comments: Lethargic   HENT:      Head: Normocephalic and atraumatic.      Mouth/Throat:      Mouth: Mucous membranes are dry.      Pharynx: Oropharynx is clear.   Eyes:      General: No scleral icterus.     Extraocular Movements: Extraocular movements intact.      Conjunctiva/sclera: Conjunctivae normal.   Cardiovascular:      Rate and Rhythm: Normal rate and regular rhythm.      Heart sounds: Normal heart sounds. No murmur heard.  Pulmonary:      Effort: Pulmonary effort is normal. No respiratory distress.   Abdominal:      General: Abdomen is flat. There is no distension.      Palpations: Abdomen is soft.       Tenderness: There is abdominal tenderness. There is no guarding or rebound.      Comments: Less distension, surgical wound dressings clean and intact   Musculoskeletal:         General: No tenderness.      Cervical back: Normal range of motion and neck supple.      Right lower leg: No edema.      Left lower leg: No edema.   Skin:     General: Skin is warm and dry.      Coloration: Skin is not jaundiced or pale.   Neurological:      General: No focal deficit present.      Mental Status: She is oriented to person, place, and time. Mental status is at baseline.            Vents:  Oxygen Concentration (%): 35 (05/17/24 0442)  Lines/Drains/Airways       Drain  Duration                  Urethral Catheter 05/15/24 1600 2 days         NG/OG Tube 05/17/24 0930 16 Fr. Right nostril 1 day              Arterial Line  Duration             Arterial Line 05/17/24 1919 Left Radial <1 day              Peripheral Intravenous Line  Duration                  Peripheral IV - Single Lumen 05/16/24 1846 20 G Anterior;Right Upper Arm 1 day         Peripheral IV - Single Lumen 05/17/24 0800 20 G Distal;Left;Posterior Forearm 1 day         Peripheral IV - Single Lumen 05/17/24 1112 20 G Posterior;Right Forearm <1 day         Peripheral IV - Single Lumen 05/17/24 1845 18 G Right Hand <1 day                  Significant Labs:    CBC/Anemia Profile:  Recent Labs   Lab 05/17/24 2114 05/18/24  0054 05/18/24  0453   WBC 42.68* 39.83* 39.93*   HGB 11.0* 9.7* 10.0*   HCT 32.7* 28.5* 30.3*    194 204   MCV 90 89 90   RDW 16.9* 16.5* 17.2*        Chemistries:  Recent Labs   Lab 05/17/24  0423 05/17/24 2114 05/18/24  0453   * 147* 149*   K 3.7 4.1  4.2 3.5   * 113* 114*   CO2 21* 21* 24   BUN 22 16 17   CREATININE 0.9 0.8 0.9   CALCIUM 7.6* 8.3* 8.2*   ALBUMIN 2.4* 2.0* 1.9*   PROT 5.3* 5.3* 4.8*   BILITOT 2.2* 1.3* 1.3*   ALKPHOS 93 102 100   ALT 29 38 43   AST 80* 140* 135*   MG 1.9 2.6 2.3   PHOS 3.4  --  4.7*       CMP:    Recent Labs   Lab 05/17/24  0423 05/17/24  2114 05/18/24  0453   * 147* 149*   K 3.7 4.1  4.2 3.5   * 113* 114*   CO2 21* 21* 24    100 99   BUN 22 16 17   CREATININE 0.9 0.8 0.9   CALCIUM 7.6* 8.3* 8.2*   PROT 5.3* 5.3* 4.8*   ALBUMIN 2.4* 2.0* 1.9*   BILITOT 2.2* 1.3* 1.3*   ALKPHOS 93 102 100   AST 80* 140* 135*   ALT 29 38 43   ANIONGAP 14 13 11       Significant Imaging:  I have reviewed all pertinent imaging results/findings within the past 24 hours.    ABG  Recent Labs   Lab 05/17/24  0442   PH 7.334*   PO2 23*   PCO2 56.8*   HCO3 30.2*   BE 4*     Assessment/Plan:     Pulmonary  Pulmonary emphysema  Not on oxygen at home.     -Home inhalers held in the setting of worsening ileus, resume when appropriate   -Duonebs PRN    Cardiac/Vascular  (HFpEF) heart failure with preserved ejection fraction  Noted in history but last echo reveals normal function. Not on home medications.    Transthoracic echo (TTE) complete 10/29/2023    Interpretation Summary    Left Ventricle: The left ventricle is normal in size. Normal wall thickness. Normal wall motion. There is normal systolic function with a visually estimated ejection fraction of 60 - 65%. There is normal diastolic function.    Right Ventricle: Mild right ventricular enlargement. Wall thickness is normal. Right ventricle wall motion  is normal. Systolic function is normal.    Aortic Valve: The aortic valve is a unicuspid valve. There is mild aortic valve sclerosis. There is trace aortic regurgitation.    Mitral Valve: There is mild regurgitation.    Pulmonary Artery: There is mild pulmonary hypertension. The estimated pulmonary artery systolic pressure is 40 mmHg.    IVC/SVC: Intermediate venous pressure at 8 mmHg.        Hypertension  Hypertension Medications                    amLODIPine (NORVASC) 5 MG tablet TAKE 1 TABLET BY MOUTH EVERY DAY     carvediloL (COREG) 6.25 MG tablet Take 1 tablet (6.25 mg total) by mouth 2 (two) times daily with  meals.     hydroCHLOROthiazide (HYDRODIURIL) 25 MG tablet Take 1 tablet (25 mg total) by mouth once daily.       -Will hold antihypertensives in setting of acute bleed and hypotension    Bilateral carotid artery stenosis  April 2022     No evidence of hemodynamically significant stenosis is demonstrated in the extracranial carotid arteries.  Patent right carotid stent    Oncology  Leukocytosis  Leukocytosis on admission, initially thought to be acute phase reactant to GIB as patient afebrile.   - Started on IV Zosyn for concern for intraabdominal infection given worsening abdominal pain, distension  - Bcx2 NGTD    GI  * Acute lower GI bleeding  73 year old female with multiple medical problems presenting with melena and some bright red blood with associated pre-syncope found to have hgb of 5.9 (from baseline ~10). CTA with acute gastrointestinal bleed at the level of the cecum. Patient had embolization by IR but continued to to have larger volume dark red blood in stool with falling hgb, repeat CTA without active bleed.     S/p 8U pRBCs, 1 FFP, 1 platelet      -- Colonoscopy performed 5/16 with no evidence of active bleed, old blood and clots evacuated  -- IR consulted, embolization performed 5/14  -- NPO   -- Protonix 40 mg qd  -- Hold all A/C and A/P agents   -- Correct any coagulopathy with platelets and FFP for goal of platelets > 50K and INR <2.0   -- Maintain large bore IV access   -- MAP > 65 goal   -- Maintain type and screen   -- Trend CBCs    Ischemia, bowel  Pain out of proportion to exam  S/p ex lap 5/17 with incarcerated hernia repair with bowel resection per general surgery  General surgery consulted, following  H/H stable    Ileus  Noted on CT A/P  NGT placed to suction 5/17    Other  History Situational (ie Stress Induced) syncope ([facility] admission 12-25-23  History of syncope. Thought to be reflex mediated. Had pre-syncopal episode at home after having diarrhea but before she noticed active  bleeding.           Critical secondary to Patient has a condition that poses threat to life and bodily function: Bowel ischemia      Critical care was time spent personally by me on the following activities: development of treatment plan with patient or surrogate and bedside caregivers, discussions with consultants, evaluation of patient's response to treatment, examination of patient, ordering and performing treatments and interventions, ordering and review of laboratory studies, ordering and review of radiographic studies, pulse oximetry, re-evaluation of patient's condition. This critical care time did not overlap with that of any other provider or involve time for any procedures.     Alfredito Cobb MD  Critical Care Medicine  [facility] - Cardiac Medical [facility]

## 2024-05-18 NOTE — ANESTHESIA PROCEDURE NOTES
Intubation    Date/Time: 5/17/2024 6:41 PM    Performed by: Tracy Rahman MD  Authorized by: Tracy Rahman MD    Intubation:     Induction:  Intravenous    Intubated:  Postinduction    Mask Ventilation:  Easy mask    Attempts:  1    Attempted By:  Staff anesthesiologist    Method of Intubation:  Video laryngoscopy    Blade:  Ibrahim 3    Laryngeal View Grade: Grade IIA - cords partially seen      Difficult Airway Encountered?: No      Complications:  None    Airway Device:  Oral endotracheal tube    Airway Device Size:  7.0    Style/Cuff Inflation:  Cuffed    Inflation Amount (mL):  7    Tube secured:  22    Secured at:  The teeth    Placement Verified By:  Capnometry    Complicating Factors:  None    Findings Post-Intubation:  BS equal bilateral and atraumatic/condition of teeth unchanged

## 2024-05-19 LAB
ALBUMIN SERPL BCP-MCNC: 1.6 G/DL (ref 3.5–5.2)
ALLENS TEST: ABNORMAL
ALP SERPL-CCNC: 80 U/L (ref 55–135)
ALT SERPL W/O P-5'-P-CCNC: 54 U/L (ref 10–44)
ANION GAP SERPL CALC-SCNC: 8 MMOL/L (ref 8–16)
ANISOCYTOSIS BLD QL SMEAR: ABNORMAL
ANISOCYTOSIS BLD QL SMEAR: SLIGHT
AST SERPL-CCNC: 161 U/L (ref 10–40)
BASO STIPL BLD QL SMEAR: ABNORMAL
BASO STIPL BLD QL SMEAR: ABNORMAL
BASOPHILS # BLD AUTO: 0.02 K/UL (ref 0–0.2)
BASOPHILS # BLD AUTO: 0.02 K/UL (ref 0–0.2)
BASOPHILS NFR BLD: 0.1 % (ref 0–1.9)
BASOPHILS NFR BLD: 0.1 % (ref 0–1.9)
BILIRUB SERPL-MCNC: 0.9 MG/DL (ref 0.1–1)
BLD PROD TYP BPU: NORMAL
BLD PROD TYP BPU: NORMAL
BLOOD UNIT EXPIRATION DATE: NORMAL
BLOOD UNIT EXPIRATION DATE: NORMAL
BLOOD UNIT TYPE CODE: 6200
BLOOD UNIT TYPE CODE: 6200
BLOOD UNIT TYPE: NORMAL
BLOOD UNIT TYPE: NORMAL
BUN SERPL-MCNC: 15 MG/DL (ref 8–23)
BURR CELLS BLD QL SMEAR: ABNORMAL
CALCIUM SERPL-MCNC: 7.7 MG/DL (ref 8.7–10.5)
CHLORIDE SERPL-SCNC: 114 MMOL/L (ref 95–110)
CO2 SERPL-SCNC: 26 MMOL/L (ref 23–29)
CODING SYSTEM: NORMAL
CODING SYSTEM: NORMAL
CREAT SERPL-MCNC: 0.7 MG/DL (ref 0.5–1.4)
CROSSMATCH INTERPRETATION: NORMAL
CROSSMATCH INTERPRETATION: NORMAL
DELSYS: ABNORMAL
DIFFERENTIAL METHOD BLD: ABNORMAL
DIFFERENTIAL METHOD BLD: ABNORMAL
DISPENSE STATUS: NORMAL
DISPENSE STATUS: NORMAL
DOHLE BOD BLD QL SMEAR: PRESENT
EOSINOPHIL # BLD AUTO: 0.4 K/UL (ref 0–0.5)
EOSINOPHIL # BLD AUTO: 0.6 K/UL (ref 0–0.5)
EOSINOPHIL NFR BLD: 1.6 % (ref 0–8)
EOSINOPHIL NFR BLD: 3 % (ref 0–8)
ERYTHROCYTE [DISTWIDTH] IN BLOOD BY AUTOMATED COUNT: 18.4 % (ref 11.5–14.5)
ERYTHROCYTE [DISTWIDTH] IN BLOOD BY AUTOMATED COUNT: 18.7 % (ref 11.5–14.5)
ERYTHROCYTE [SEDIMENTATION RATE] IN BLOOD BY WESTERGREN METHOD: 14 MM/H
ERYTHROCYTE [SEDIMENTATION RATE] IN BLOOD BY WESTERGREN METHOD: 16 MM/H
EST. GFR  (NO RACE VARIABLE): >60 ML/MIN/1.73 M^2
FLOW: 15
FLOW: 15
GIANT PLATELETS BLD QL SMEAR: PRESENT
GLUCOSE SERPL-MCNC: 109 MG/DL (ref 70–110)
HCO3 UR-SCNC: 29.4 MMOL/L (ref 24–28)
HCO3 UR-SCNC: 29.5 MMOL/L (ref 24–28)
HCO3 UR-SCNC: 29.6 MMOL/L (ref 24–28)
HCT VFR BLD AUTO: 25.7 % (ref 37–48.5)
HCT VFR BLD AUTO: 28.1 % (ref 37–48.5)
HCT VFR BLD CALC: 25 %PCV (ref 36–54)
HGB BLD-MCNC: 8.1 G/DL (ref 12–16)
HGB BLD-MCNC: 9 G/DL (ref 12–16)
HYPOCHROMIA BLD QL SMEAR: ABNORMAL
HYPOCHROMIA BLD QL SMEAR: ABNORMAL
IMM GRANULOCYTES # BLD AUTO: 0.85 K/UL (ref 0–0.04)
IMM GRANULOCYTES # BLD AUTO: 1.14 K/UL (ref 0–0.04)
IMM GRANULOCYTES NFR BLD AUTO: 4.5 % (ref 0–0.5)
IMM GRANULOCYTES NFR BLD AUTO: 5 % (ref 0–0.5)
INR PPP: 1 (ref 0.8–1.2)
LYMPHOCYTES # BLD AUTO: 0.8 K/UL (ref 1–4.8)
LYMPHOCYTES # BLD AUTO: 0.8 K/UL (ref 1–4.8)
LYMPHOCYTES NFR BLD: 3.6 % (ref 18–48)
LYMPHOCYTES NFR BLD: 4.3 % (ref 18–48)
MAGNESIUM SERPL-MCNC: 1.9 MG/DL (ref 1.6–2.6)
MCH RBC QN AUTO: 29.8 PG (ref 27–31)
MCH RBC QN AUTO: 30 PG (ref 27–31)
MCHC RBC AUTO-ENTMCNC: 31.5 G/DL (ref 32–36)
MCHC RBC AUTO-ENTMCNC: 32 G/DL (ref 32–36)
MCV RBC AUTO: 94 FL (ref 82–98)
MCV RBC AUTO: 95 FL (ref 82–98)
MODE: ABNORMAL
MODE: ABNORMAL
MONOCYTES # BLD AUTO: 1.4 K/UL (ref 0.3–1)
MONOCYTES # BLD AUTO: 1.7 K/UL (ref 0.3–1)
MONOCYTES NFR BLD: 7.5 % (ref 4–15)
MONOCYTES NFR BLD: 7.7 % (ref 4–15)
NEUTROPHILS # BLD AUTO: 15.4 K/UL (ref 1.8–7.7)
NEUTROPHILS # BLD AUTO: 18.6 K/UL (ref 1.8–7.7)
NEUTROPHILS NFR BLD: 80.6 % (ref 38–73)
NEUTROPHILS NFR BLD: 82 % (ref 38–73)
NRBC BLD-RTO: 1 /100 WBC
NRBC BLD-RTO: 1 /100 WBC
NUM UNITS TRANS PACKED RBC: NORMAL
NUM UNITS TRANS PACKED RBC: NORMAL
OVALOCYTES BLD QL SMEAR: ABNORMAL
PCO2 BLDA: 51.5 MMHG (ref 35–45)
PCO2 BLDA: 53.8 MMHG (ref 35–45)
PCO2 BLDA: 57.5 MMHG (ref 35–45)
PH SMN: 7.32 [PH] (ref 7.35–7.45)
PH SMN: 7.35 [PH] (ref 7.35–7.45)
PH SMN: 7.37 [PH] (ref 7.35–7.45)
PHOSPHATE SERPL-MCNC: 3.4 MG/DL (ref 2.7–4.5)
PLATELET # BLD AUTO: 168 K/UL (ref 150–450)
PLATELET # BLD AUTO: 170 K/UL (ref 150–450)
PLATELET BLD QL SMEAR: ABNORMAL
PLATELET BLD QL SMEAR: ABNORMAL
PMV BLD AUTO: 10.2 FL (ref 9.2–12.9)
PMV BLD AUTO: 10.2 FL (ref 9.2–12.9)
PO2 BLDA: 50 MMHG (ref 80–100)
PO2 BLDA: 86 MMHG (ref 80–100)
PO2 BLDA: 96 MMHG (ref 80–100)
POC BE: 3 MMOL/L
POC BE: 4 MMOL/L
POC BE: 4 MMOL/L
POC IONIZED CALCIUM: 1.13 MMOL/L (ref 1.06–1.42)
POC SATURATED O2: 81 % (ref 95–100)
POC SATURATED O2: 96 % (ref 95–100)
POC SATURATED O2: 97 % (ref 95–100)
POC TCO2: 31 MMOL/L (ref 23–27)
POIKILOCYTOSIS BLD QL SMEAR: SLIGHT
POLYCHROMASIA BLD QL SMEAR: ABNORMAL
POLYCHROMASIA BLD QL SMEAR: ABNORMAL
POTASSIUM BLD-SCNC: 3.6 MMOL/L (ref 3.5–5.1)
POTASSIUM SERPL-SCNC: 3.3 MMOL/L (ref 3.5–5.1)
PROT SERPL-MCNC: 4.5 G/DL (ref 6–8.4)
PROTHROMBIN TIME: 11.4 SEC (ref 9–12.5)
RBC # BLD AUTO: 2.72 M/UL (ref 4–5.4)
RBC # BLD AUTO: 3 M/UL (ref 4–5.4)
SAMPLE: ABNORMAL
SCHISTOCYTES BLD QL SMEAR: ABNORMAL
SITE: ABNORMAL
SODIUM BLD-SCNC: 151 MMOL/L (ref 136–145)
SODIUM SERPL-SCNC: 148 MMOL/L (ref 136–145)
SP02: 100
SP02: 98
TARGETS BLD QL SMEAR: ABNORMAL
TOXIC GRANULES BLD QL SMEAR: PRESENT
TOXIC GRANULES BLD QL SMEAR: PRESENT
WBC # BLD AUTO: 19.04 K/UL (ref 3.9–12.7)
WBC # BLD AUTO: 22.67 K/UL (ref 3.9–12.7)
WBC TOXIC VACUOLES BLD QL SMEAR: PRESENT

## 2024-05-19 PROCEDURE — 27100171 HC OXYGEN HIGH FLOW UP TO 24 HOURS: Mod: NTX

## 2024-05-19 PROCEDURE — 85610 PROTHROMBIN TIME: CPT | Mod: NTX

## 2024-05-19 PROCEDURE — 94761 N-INVAS EAR/PLS OXIMETRY MLT: CPT | Mod: NTX,XB

## 2024-05-19 PROCEDURE — C9113 INJ PANTOPRAZOLE SODIUM, VIA: HCPCS | Mod: NTX | Performed by: INTERNAL MEDICINE

## 2024-05-19 PROCEDURE — 97162 PT EVAL MOD COMPLEX 30 MIN: CPT | Mod: NTX

## 2024-05-19 PROCEDURE — 85007 BL SMEAR W/DIFF WBC COUNT: CPT | Mod: NTX

## 2024-05-19 PROCEDURE — 37799 UNLISTED PX VASCULAR SURGERY: CPT | Mod: NTX

## 2024-05-19 PROCEDURE — 25000003 PHARM REV CODE 250: Mod: NTX

## 2024-05-19 PROCEDURE — 97530 THERAPEUTIC ACTIVITIES: CPT | Mod: NTX

## 2024-05-19 PROCEDURE — 85025 COMPLETE CBC W/AUTO DIFF WBC: CPT | Mod: NTX

## 2024-05-19 PROCEDURE — 80053 COMPREHEN METABOLIC PANEL: CPT | Mod: NTX

## 2024-05-19 PROCEDURE — 99233 SBSQ HOSP IP/OBS HIGH 50: CPT | Mod: GC,NTX,, | Performed by: INTERNAL MEDICINE

## 2024-05-19 PROCEDURE — 63600175 PHARM REV CODE 636 W HCPCS: Mod: NTX

## 2024-05-19 PROCEDURE — 20000000 HC ICU ROOM: Mod: NTX

## 2024-05-19 PROCEDURE — 84100 ASSAY OF PHOSPHORUS: CPT | Mod: NTX

## 2024-05-19 PROCEDURE — 63600175 PHARM REV CODE 636 W HCPCS: Mod: NTX | Performed by: INTERNAL MEDICINE

## 2024-05-19 PROCEDURE — 97166 OT EVAL MOD COMPLEX 45 MIN: CPT | Mod: NTX

## 2024-05-19 PROCEDURE — 36600 WITHDRAWAL OF ARTERIAL BLOOD: CPT | Mod: NTX

## 2024-05-19 PROCEDURE — 27000221 HC OXYGEN, UP TO 24 HOURS: Mod: NTX

## 2024-05-19 PROCEDURE — 97116 GAIT TRAINING THERAPY: CPT | Mod: NTX

## 2024-05-19 PROCEDURE — 99900035 HC TECH TIME PER 15 MIN (STAT): Mod: NTX

## 2024-05-19 PROCEDURE — 85027 COMPLETE CBC AUTOMATED: CPT | Mod: NTX

## 2024-05-19 PROCEDURE — 82803 BLOOD GASES ANY COMBINATION: CPT | Mod: NTX

## 2024-05-19 PROCEDURE — 83735 ASSAY OF MAGNESIUM: CPT | Mod: NTX

## 2024-05-19 RX ORDER — SODIUM CHLORIDE, SODIUM LACTATE, POTASSIUM CHLORIDE, CALCIUM CHLORIDE 600; 310; 30; 20 MG/100ML; MG/100ML; MG/100ML; MG/100ML
INJECTION, SOLUTION INTRAVENOUS CONTINUOUS
Status: DISCONTINUED | OUTPATIENT
Start: 2024-05-19 | End: 2024-05-19

## 2024-05-19 RX ORDER — DEXTROSE MONOHYDRATE 50 MG/ML
INJECTION, SOLUTION INTRAVENOUS CONTINUOUS
Status: ACTIVE | OUTPATIENT
Start: 2024-05-19 | End: 2024-05-19

## 2024-05-19 RX ORDER — ACETAMINOPHEN 325 MG/1
650 TABLET ORAL EVERY 6 HOURS PRN
Status: DISCONTINUED | OUTPATIENT
Start: 2024-05-19 | End: 2024-05-24

## 2024-05-19 RX ORDER — DEXTROSE MONOHYDRATE 50 MG/ML
INJECTION, SOLUTION INTRAVENOUS CONTINUOUS
Status: DISCONTINUED | OUTPATIENT
Start: 2024-05-19 | End: 2024-05-19

## 2024-05-19 RX ORDER — MAGNESIUM SULFATE HEPTAHYDRATE 40 MG/ML
2 INJECTION, SOLUTION INTRAVENOUS ONCE
Status: COMPLETED | OUTPATIENT
Start: 2024-05-19 | End: 2024-05-19

## 2024-05-19 RX ORDER — LIDOCAINE 50 MG/G
1 PATCH TOPICAL
Status: DISCONTINUED | OUTPATIENT
Start: 2024-05-20 | End: 2024-05-22

## 2024-05-19 RX ORDER — POTASSIUM CHLORIDE 7.45 MG/ML
10 INJECTION INTRAVENOUS
Status: COMPLETED | OUTPATIENT
Start: 2024-05-19 | End: 2024-05-19

## 2024-05-19 RX ORDER — QUETIAPINE FUMARATE 25 MG/1
50 TABLET, FILM COATED ORAL NIGHTLY
Status: DISCONTINUED | OUTPATIENT
Start: 2024-05-19 | End: 2024-05-23

## 2024-05-19 RX ORDER — ACETAMINOPHEN 10 MG/ML
1000 INJECTION, SOLUTION INTRAVENOUS ONCE
Status: COMPLETED | OUTPATIENT
Start: 2024-05-19 | End: 2024-05-19

## 2024-05-19 RX ADMIN — ACETAMINOPHEN 1000 MG: 10 INJECTION, SOLUTION INTRAVENOUS at 04:05

## 2024-05-19 RX ADMIN — PIPERACILLIN SODIUM AND TAZOBACTAM SODIUM 4.5 G: 4; .5 INJECTION, POWDER, FOR SOLUTION INTRAVENOUS at 04:05

## 2024-05-19 RX ADMIN — POTASSIUM CHLORIDE 10 MEQ: 7.46 INJECTION, SOLUTION INTRAVENOUS at 10:05

## 2024-05-19 RX ADMIN — ACETAMINOPHEN 650 MG: 325 TABLET ORAL at 08:05

## 2024-05-19 RX ADMIN — METHOCARBAMOL 250 MG: 100 INJECTION, SOLUTION INTRAMUSCULAR; INTRAVENOUS at 01:05

## 2024-05-19 RX ADMIN — POTASSIUM CHLORIDE 10 MEQ: 7.46 INJECTION, SOLUTION INTRAVENOUS at 11:05

## 2024-05-19 RX ADMIN — PANTOPRAZOLE SODIUM 40 MG: 40 INJECTION, POWDER, FOR SOLUTION INTRAVENOUS at 12:05

## 2024-05-19 RX ADMIN — POTASSIUM CHLORIDE 10 MEQ: 7.46 INJECTION, SOLUTION INTRAVENOUS at 09:05

## 2024-05-19 RX ADMIN — POTASSIUM CHLORIDE 10 MEQ: 7.46 INJECTION, SOLUTION INTRAVENOUS at 06:05

## 2024-05-19 RX ADMIN — DEXTROSE MONOHYDRATE: 50 INJECTION, SOLUTION INTRAVENOUS at 04:05

## 2024-05-19 RX ADMIN — DEXMEDETOMIDINE HYDROCHLORIDE 0.4 MCG/KG/HR: 4 INJECTION INTRAVENOUS at 09:05

## 2024-05-19 RX ADMIN — PIPERACILLIN SODIUM AND TAZOBACTAM SODIUM 4.5 G: 4; .5 INJECTION, POWDER, FOR SOLUTION INTRAVENOUS at 07:05

## 2024-05-19 RX ADMIN — QUETIAPINE FUMARATE 50 MG: 25 TABLET ORAL at 08:05

## 2024-05-19 RX ADMIN — MAGNESIUM SULFATE HEPTAHYDRATE 2 G: 40 INJECTION, SOLUTION INTRAVENOUS at 09:05

## 2024-05-19 RX ADMIN — DEXTROSE MONOHYDRATE: 50 INJECTION, SOLUTION INTRAVENOUS at 12:05

## 2024-05-19 RX ADMIN — POTASSIUM CHLORIDE 10 MEQ: 7.46 INJECTION, SOLUTION INTRAVENOUS at 07:05

## 2024-05-19 RX ADMIN — POTASSIUM CHLORIDE 10 MEQ: 7.46 INJECTION, SOLUTION INTRAVENOUS at 12:05

## 2024-05-19 NOTE — PT/OT/SLP EVAL
Physical Therapy Evaluation    Patient Name:  Jessica Floyd   MRN:  [id number]  Admit Date: 5/14/2024  Admitting Diagnosis:  Acute lower GI bleeding  Length of Stay: 5 days  Recent Surgery: Procedure(s) (LRB):  LAPAROSCOPY, DIAGNOSTIC (N/A)  LAPAROTOMY, EXPLORATORY (N/A)  EXCISION, SMALL INTESTINE (N/A)  REPAIR, HERNIA, INGUINAL (Bilateral) 2 Days Post-Op    Recommendations:     Discharge Recommendations:  High Intensity Therapy   Discharge Equipment Recommendations: shower chair     Assessment:     Jessica Floyd is a 73 y.o. female admitted with a medical diagnosis of Acute lower GI bleeding. Pt found in significant pain . VSS and pt able to participate in therapy. Pt was able to perform a step transfer to the chair using RW and two person assistance. Will continue to work on gait distance and activity tolerance. Pt would benefit from continued acute PT to maximize functional mobility after surgical intervention. Patient presents with good participation and motivation to return to prior level of function with High Intensity Therapy.  The Patient demonstrates appropriate endurance and strength to participate in up to 3 hours or 15hrs of combined therapy post acute.    Problem List: weakness, impaired endurance, impaired functional mobility, gait instability, impaired balance, impaired cardiopulmonary response to activity, pain  Rehab Prognosis: Good; patient would benefit from acute skilled PT services to address these deficits and reach maximum level of function.      Plan:     During this hospitalization, patient to be seen 4 x/week to address the identified rehab impairments via gait training, therapeutic activities, therapeutic exercises, neuromuscular re-education and progress towards the established goals.    Plan of Care Expires:  06/18/24    Subjective   Communicated with RN prior to session.  Patient found HOB elevated upon PT entry to room, agreeable to evaluation. Jessica Floyd's  and daughter present  during session.    Chief Complaint: Melena (Arrives via EMS from home, diarrhea x 2 days, Had BM last night with dark blood in it, had iron infusion on Monday, denies pain)    Patient/Family Comments/goals: to get better   Pain/Comfort:  Location 1:  (abdomen; unrated)  Pain Addressed 1: Reposition, Distraction    Living Environment:  Living Environment: Pt lives with her  in a [facility] with 3 EMILY and B HR. She has a walk-in shower with a bench and GB present.   Previous level of function: Independent with ADLs and Mod I with rollator for mobility more recently.   Equipment Used at Home: cane, straight, rollator, wheelchair, oxygen, walker, rolling, raised toilet  Assistance upon Discharge: Pt will have assistance from her      Objective:   Patient found with: telemetry, pulse ox (continuous), blood pressure cuff, peripheral IV, abreu catheter, NG tube, arterial line, oxygen     General Precautions: Standard, Cardiac fall   Orthopedic Precautions:N/A   Braces: N/A   Oxygen Device: High Flow Nasal Cannula   Vitals: BP (!) 146/62 (BP Location: Left arm, Patient Position: Lying)   Pulse 75   Temp 97.9 °F (36.6 °C) (Axillary)   Resp (!) 28   Wt 52.6 kg (115 lb 15.4 oz)   SpO2 (!) 93%   BMI 20.54 kg/m²     Exams:  Cognition:   Alert, Cooperative, Anxious   Ox4  Command following: Follows two step commands   Fluency: clear/fluent    RLE ROM: WFL  RLE Strength: grossly 4/5  LLE ROM: WFL  LLE Strength: grossly 4/5      Outcome Measures:  AM-PAC 6 CLICK MOBILITY  Turning over in bed (including adjusting bedclothes, sheets and blankets)?: 2  Sitting down on and standing up from a chair with arms (e.g., wheelchair, bedside commode, etc.): 2  Moving from lying on back to sitting on the side of the bed?: 2  Moving to and from a bed to a chair (including a wheelchair)?: 2  Need to walk in hospital room?: 2  Climbing 3-5 steps with a railing?: 1  Basic Mobility Total Score: 11     Functional Mobility:  Additional  staff present: OT  Bed Mobility:  Supine to Sit: maximal assistance and 2 persons; HOB elevated  Scooting anteriorly to EOB to have both feet planted on floor: maximal assistance    Sitting Balance at Edge of Bed:  Assistance Level Required: Stand-by Assistance  Time: 5 minutes   Comments:   Worked on activity tolerance sitting EOB and worked on tolerance to positional change   Performed ADLs with OT    Transfers:   Sit <> Stand Transfer: moderate assistance with rolling walker from EOB  Facilitation of anterior weight shift and hip and thoracic extension       Gait:   Patient ambulated: 2ft to chair    Patient required: moderate assist  Patient used: rolling walker  Gait Pattern observed: reciprocal gait  Gait Deviation(s): occasional unsteady gait, decreased step length, decreased aliza, and impaired sequencing due to increased anxiety   Impairments due to: impaired balance, pain, and decreased endurance  Comments:    Facilitation of RW management and upright posture  Verbal cueing for advancement of B LE  Encouragement and support provided       Therapeutic Activities, Exercises, & Education:   Educated pt on PT role/POC  Educated pt on importance of OOB activity and daily ambulation   Pt verbalized understanding     T/f to chair to increase tolerance to OOB activity and to create optimal positioning for lung expansion       Patient left up in chair with all lines intact, call button in reach, and RN notified and  and daughter present     GOALS:   Multidisciplinary Problems       Physical Therapy Goals          Problem: Physical Therapy    Goal Priority Disciplines Outcome Goal Variances Interventions   Physical Therapy Goal     PT, PT/OT Progressing     Description: Goals to be met by: 2024    Patient will increase functional independence with mobility by performin. Supine to sit with Minimal Assistance   2. Sit to stand transfer with Contact Guard Assistance using RW  3. Gait  x 25 feet with  Contact Guard Assistance using Rolling Walker.   4. Ascend/descend 3 stair with bilateral Handrails Contact Guard Assistance using RW.   5. Stand for 3 minutes with Contact Guard Assistance using using RW                          History:     Past Medical History:   Diagnosis Date    Allergic rhinitis     Amblyopia     Carotid stenosis     Coronary atherosclerosis     Outside [facility] 6/2014: 20% mLAD, 50% mCx, 20%, mRCA    Dyslipidemia     ESBL (extended spectrum beta-lactamase) producing bacteria infection     UTI    Hypertension     Nausea and vomiting 05/26/2017    Pulmonary emphysema 10/28/2023    SAH (subarachnoid hemorrhage) 08/24/2016 1999, s/p clipping    Subarachnoid hemorrhage due to ruptured aneurysm 1999       Past Surgical History:   Procedure Laterality Date    ADENOIDECTOMY      ANTERIOR CRUCIATE LIGAMENT REPAIR  2012    APPENDECTOMY      CATARACT EXTRACTION W/  INTRAOCULAR LENS IMPLANT Right 11/07/2022    Procedure: EXTRACTION, CATARACT, WITH IOL INSERTION;  Surgeon: Higinio Cristina MD;  Location: [facility];  Service: Ophthalmology;  Laterality: Right;    CATARACT EXTRACTION W/  INTRAOCULAR LENS IMPLANT Left 11/21/2022    Procedure: EXTRACTION, CATARACT, WITH IOL INSERTION;  Surgeon: Higinio Cristina MD;  Location: [facility];  Service: Ophthalmology;  Laterality: Left;    CEREBRAL ANEURYSM REPAIR  1999    clip    COLONOSCOPY N/A 5/16/2024    Procedure: COLONOSCOPY;  Surgeon: Rich Syed MD;  Location: [facility] ([address] [place] [address]);  Service: Endoscopy;  Laterality: N/A;    DENTAL SURGERY      DIAGNOSTIC LAPAROSCOPY N/A 5/17/2024    Procedure: LAPAROSCOPY, DIAGNOSTIC;  Surgeon: Ruben Oscar MD;  [address];  Service: General;  Laterality: N/A;    ESOPHAGOGASTRODUODENOSCOPY N/A 5/15/2024    Procedure: EGD (ESOPHAGOGASTRODUODENOSCOPY);  Surgeon: Rich Syed MD;  Location: [facility] ([address] [place] [address]);  Service: Endoscopy;  Laterality: N/A;    EXCISION, SMALL INTESTINE N/A 5/17/2024     Procedure: EXCISION, SMALL INTESTINE;  Surgeon: Ruben Oscar MD;  Location: [address];  Service: General;  Laterality: N/A;    EYE SURGERY Bilateral     cataract removal and lens implants    HIP ARTHROPLASTY Left 05/28/2023    Procedure: TOTAL HIP ARTHROPLASTY;  Surgeon: Juan Wan MD;  Location: [address];  Service: Orthopedics;  Laterality: Left;    LAPAROTOMY, EXPLORATORY N/A 5/17/2024    Procedure: LAPAROTOMY, EXPLORATORY;  Surgeon: Ruben Oscar MD;  Location: [address];  Service: General;  Laterality: N/A;    REPAIR, HERNIA, INGUINAL Bilateral 5/17/2024    Procedure: REPAIR, HERNIA, INGUINAL;  Surgeon: Ruben Oscar MD;  Location: [address];  Service: General;  Laterality: Bilateral;    TONSILLECTOMY         Time Tracking:     PT Received On: 05/19/24  PT Start Time: 0840     PT Stop Time: 0903  PT Total Time (min): 23 min     Billable Minutes: Evaluation 8 and Gait Training 8

## 2024-05-19 NOTE — SUBJECTIVE & OBJECTIVE
Interval History/Significant Events: Patient oversedated on 5/18 and required BIPAP briefly for CO2 retention. Subsequently placed on precedex for agitation and IV tylenol for pain control, tolerating well.    Review of Systems   Unable to perform ROS: Mental status change     Objective:     Vital Signs (Most Recent):  Temp: 97.9 °F (36.6 °C) (05/19/24 0701)  Pulse: 89 (05/19/24 1000)  Resp: (!) 28 (05/19/24 1000)  BP: (!) 146/62 (05/18/24 0800)  SpO2: (!) 92 % (05/19/24 1000) Vital Signs (24h Range):  Temp:  [97.7 °F (36.5 °C)-99 °F (37.2 °C)] 97.9 °F (36.6 °C)  Pulse:  [] 89  Resp:  [13-61] 28  SpO2:  [82 %-100 %] 92 %  Arterial Line BP: (100-166)/(52-79) 140/75   Weight: 52.6 kg (115 lb 15.4 oz)  Body mass index is 20.54 kg/m².      Intake/Output Summary (Last 24 hours) at 5/19/2024 1222  Last data filed at 5/19/2024 0601  Gross per 24 hour   Intake 744.97 ml   Output 975 ml   Net -230.03 ml          Physical Exam  Vitals and nursing note reviewed.   Constitutional:       General: She is not in acute distress.  HENT:      Head: Normocephalic and atraumatic.      Mouth/Throat:      Mouth: Mucous membranes are dry.      Pharynx: Oropharynx is clear.   Eyes:      General: No scleral icterus.     Extraocular Movements: Extraocular movements intact.      Conjunctiva/sclera: Conjunctivae normal.   Cardiovascular:      Rate and Rhythm: Normal rate and regular rhythm.      Heart sounds: Normal heart sounds. No murmur heard.  Pulmonary:      Effort: Pulmonary effort is normal. No respiratory distress.   Abdominal:      General: Abdomen is flat. There is no distension.      Palpations: Abdomen is soft.      Tenderness: There is abdominal tenderness (Mild). There is no guarding or rebound.      Comments: Less distension, surgical wound dressings clean and intact   Musculoskeletal:         General: No tenderness.      Cervical back: Normal range of motion and neck supple.      Right lower leg: No edema.      Left lower  leg: No edema.   Skin:     General: Skin is warm and dry.      Coloration: Skin is not jaundiced or pale.   Neurological:      General: No focal deficit present.      Mental Status: She is oriented to person, place, and time. Mental status is at baseline.            Vents:  Oxygen Concentration (%): 70 (05/18/24 1335)  Lines/Drains/Airways       Drain  Duration                  Urethral Catheter 05/15/24 1600 3 days         NG/OG Tube 05/17/24 0930 16 Fr. Right nostril 2 days              Arterial Line  Duration             Arterial Line 05/17/24 1919 Left Radial 1 day              Peripheral Intravenous Line  Duration                  Peripheral IV - Single Lumen 05/18/24 0715 20 G;1 3/4 in Anterior;Right Forearm 1 day         Peripheral IV - Single Lumen 05/18/24 0900 20 G;1 3/4 in Anterior;Left Upper Arm 1 day                  Significant Labs:    CBC/Anemia Profile:  Recent Labs   Lab 05/18/24  1229 05/18/24  1559 05/18/24  2140 05/19/24  0850   WBC 32.07*  --  28.31* 22.67*   HGB 9.2*  --  8.7* 9.0*   HCT 27.4* 26* 26.2* 28.1*     --  176 168   MCV 92  --  91 94   RDW 17.6*  --  17.9* 18.7*        Chemistries:  Recent Labs   Lab 05/17/24 2114 05/18/24 0453 05/19/24  0417   * 149* 148*   K 4.1  4.2 3.5 3.3*   * 114* 114*   CO2 21* 24 26   BUN 16 17 15   CREATININE 0.8 0.9 0.7   CALCIUM 8.3* 8.2* 7.7*   ALBUMIN 2.0* 1.9* 1.6*   PROT 5.3* 4.8* 4.5*   BILITOT 1.3* 1.3* 0.9   ALKPHOS 102 100 80   ALT 38 43 54*   * 135* 161*   MG 2.6 2.3 1.9   PHOS  --  4.7* 3.4       CMP:   Recent Labs   Lab 05/17/24 2114 05/18/24 0453 05/19/24  0417   * 149* 148*   K 4.1  4.2 3.5 3.3*   * 114* 114*   CO2 21* 24 26    99 109   BUN 16 17 15   CREATININE 0.8 0.9 0.7   CALCIUM 8.3* 8.2* 7.7*   PROT 5.3* 4.8* 4.5*   ALBUMIN 2.0* 1.9* 1.6*   BILITOT 1.3* 1.3* 0.9   ALKPHOS 102 100 80   * 135* 161*   ALT 38 43 54*   ANIONGAP 13 11 8       Significant Imaging:  I have reviewed all  pertinent imaging results/findings within the past 24 hours.

## 2024-05-19 NOTE — ASSESSMENT & PLAN NOTE
73 year old female with multiple medical problems presenting with melena and some bright red blood with associated pre-syncope found to have hgb of 5.9 (from baseline ~10). CTA with acute gastrointestinal bleed at the level of the cecum. Patient had embolization by IR but continued to to have larger volume dark red blood in stool with falling hgb, repeat CTA without active bleed.     S/p 9U pRBCs, 1 FFP, 1 platelet      -- Colonoscopy performed 5/16 with no evidence of active bleed, old blood and clots evacuated  -- IR consulted, embolization performed 5/14  -- NPO   -- Protonix 40 mg qd  -- Hold all A/C and A/P agents   -- Correct any coagulopathy with platelets and FFP for goal of platelets > 50K and INR <2.0   -- Maintain large bore IV access   -- MAP > 65 goal   -- Maintain type and screen   -- Trend CBCs

## 2024-05-19 NOTE — PLAN OF CARE
MICU DAILY GOALS     Family/Goals of care/Code Status   Code Status: Full Code    24H Vital Sign Range  Temp:  [97.7 °F (36.5 °C)-98.4 °F (36.9 °C)]   Pulse:  []   Resp:  [13-61]   SpO2:  [82 %-100 %]   Arterial Line BP: (100-166)/(52-79)      Shift Events (include procedures and significant events)   No acute events throughout shift    AWAKE RASS: Goal - RASS Goal: -1-->drowsy  Actual - RASS (Colby Agitation-Sedation Scale): alert and calm    Restraint necessity: Not necessary   BREATHE SBT: Not intubated    Coordinate A & B, analgesics/sedatives Pain: managed   SAT: Not intubated   Delirium CAM-ICU: Overall CAM-ICU: Positive   Early(intubated/ Progressive (non-intubated) Mobility MOVE Screen (INTUBATED ONLY): Not intubated    Activity: Activity Management: Rolling - L1   Feeding/Nutrition Diet order: Diet/Nutrition Received: NPO,     Thrombus DVT prophylaxis: VTE Required Core Measure: Per order contraindicated for SCDs/Anticoagulants   HOB Elevation Head of Bed (HOB) Positioning: HOB at 30-45 degrees   Ulcer Prophylaxis GI: yes   Glucose control managed     Skin Skin assessed during: Q Shift Change    Sacrum intact/not altered? No  Heels intact/not altered? Yes  Surgical wound? Yes    CHECK ONE!   (no altered skin or altered skin) and sub boxes:  [] No Altered Skin Integrity Present    []Prevention Measures Documented    [x] Altered Skin Integrity Present or Discovered   [x] LDA present in EPIC, daily doc completed              [] LDA added if not in EPIC (describe wound).                    When describing wound, do not stage, use descriptive words only.    [] Wound Image Taken (required on admit,                   transfer/discharge and every Tuesday)    Wound Care Consulted? No    4 EYES:  Attending Nurse (1st set of eyes):     Second RN/Staff Member (2nd set of eyes):    Bowel Function no issues    Indwelling Catheter Necessity      Urethral Catheter 05/15/24 1600-Reason for Continuing Urinary  Catheterization: Critically ill in ICU and requiring hourly monitoring of intake/output          De-escalation Antibiotics Yes        VS and assessment per flow sheet, patient progressing towards goals as tolerated, plan of care reviewed with family, all concerns addressed, will continue to monitor.

## 2024-05-19 NOTE — PLAN OF CARE
Problem: Physical Therapy  Goal: Physical Therapy Goal  Description: Goals to be met by: 2024    Patient will increase functional independence with mobility by performin. Supine to sit with Minimal Assistance   2. Sit to stand transfer with Contact Guard Assistance using RW  3. Gait  x 25 feet with Contact Guard Assistance using Rolling Walker.   4. Ascend/descend 3 stair with bilateral Handrails Contact Guard Assistance using RW.   5. Stand for 3 minutes with Contact Guard Assistance using using RW     Outcome: Progressing     Eval completed. Goals appropriate.

## 2024-05-19 NOTE — ASSESSMENT & PLAN NOTE
Pain out of proportion to exam  S/p ex lap 5/17 with incarcerated hernia repair with bowel resection per general surgery  General surgery consulted, following  H/H stable

## 2024-05-19 NOTE — PROGRESS NOTES
[facility] - Cardiac Medical ICU  General Surgery  Progress Note    Subjective:     History of Present Illness:  Jessica Floyd is a 73 y.o. female with a history of HTN, HFpEF, CKD3, GERD, and CAD who presented to the ED on 5/14/24 with melena. She is altered on my interview and so the history was collected from chart review and the family. Appears she was feeling light headed and as though she was going to pass out. She also noted hematochezia. Upon arrival she was AF, tachycardic, and hypotensive. CTA was concerning for GIB and so she was taken to IR where she was found to have extravasation from the ileal branch of the SMA. They performed a coil embolization of a tertiary branch. Following this, she was reportedly doing well from a mentation standpoint, but has continued to require blood transfusion. She had an EGD on 5/15 and C-scope on 5/16 neither of which revealed a source for her bleed although GI was not able to reach the cecum due to tortuosity. Over the last couple of days, she has had worsening abdominal pain and she is not able to participate with my interview due to AMS. Her family feels this is pain related although she has been received narcotics and ativan PRN.      She had a low grade fever to 100.6 earlier today. Currently tachycardic to the 100s but HDS. Her CBC is notable for WBC 40 (stable from prior) and H/H 8.6/25.5. her CMP shows a 2 bili of 2.2 but is otherwise unremarkable. Lactic acid is normal at 1.3. She had a repeat CT A/P today which showed dilated bowel consistent with an ileus. However, per her family and the primary team, her pain seems out of proportion to the findings.     Post-Op Info:  Procedure(s) (LRB):  LAPAROSCOPY, DIAGNOSTIC (N/A)  LAPAROTOMY, EXPLORATORY (N/A)  EXCISION, SMALL INTESTINE (N/A)  REPAIR, HERNIA, INGUINAL (Bilateral)   2 Days Post-Op     Interval History: Ms. Floyd is accompanied by her  this morning who states she was able to get some sleep overnight.  She endorses appropriate raquel-incisional pain. No ROBF at this time.     Medications:  Continuous Infusions:   dexmedetomidine (PRECEDEX) infusion (non-titrating)  0.4 mcg/kg/hr Intravenous Continuous 5.3 mL/hr at 05/19/24 0601 0.4 mcg/kg/hr at 05/19/24 0601     Scheduled Meds:   LIDOcaine  1 patch Transdermal Q24H    magnesium sulfate IVPB  2 g Intravenous Once    pantoprazole  40 mg Intravenous Daily    piperacillin-tazobactam (Zosyn) IV (PEDS and ADULTS) (extended infusion is not appropriate)  4.5 g Intravenous Q8H    potassium chloride  10 mEq Intravenous Q1H     PRN Meds:  Current Facility-Administered Medications:     albuterol-ipratropium, 3 mL, Nebulization, Q6H PRN    ondansetron, 4 mg, Intravenous, Q4H PRN    sodium chloride, 1 spray, Each Nostril, PRN    sodium chloride 0.9%, 10 mL, Intravenous, PRN     Review of patient's allergies indicates:  No Known Allergies  Objective:     Vital Signs (Most Recent):  Temp: 97.7 °F (36.5 °C) (05/19/24 0300)  Pulse: 70 (05/19/24 0600)  Resp: (!) 40 (05/19/24 0600)  BP: (!) 146/62 (05/18/24 0800)  SpO2: (!) 93 % (05/19/24 0600) Vital Signs (24h Range):  Temp:  [97.7 °F (36.5 °C)-99 °F (37.2 °C)] 97.7 °F (36.5 °C)  Pulse:  [] 70  Resp:  [13-61] 40  SpO2:  [86 %-100 %] 93 %  Arterial Line BP: ()/(47-79) 119/58     Weight: 52.6 kg (115 lb 15.4 oz)  Body mass index is 20.54 kg/m².    Intake/Output - Last 3 Shifts         05/17 0700 05/18 0659 05/18 0700 05/19 0659 05/19 0700 05/20 0659    I.V. (mL/kg) 48 (0.9) 46.7 (0.9)     Blood 04159.8      IV Piggyback 1933 698.3     Total Intake(mL/kg) 04326.8 (254) 745 (14.2)     Urine (mL/kg/hr) 1900 (1.5) 1325 (1)     Total Output 1900 1325     Net +23114.8 -580                     Physical Exam  Vitals and nursing note reviewed.   Constitutional:       Appearance: She is well-developed. She is not diaphoretic.      Interventions: Nasal cannula in place.      Comments: Appears uncomfortable but improved from prior    HENT:      Nose:      Comments: NGT in place     Mouth/Throat:      Pharynx: Oropharynx is clear. No oropharyngeal exudate.   Eyes:      General: No scleral icterus.     Extraocular Movements: Extraocular movements intact.   Cardiovascular:      Rate and Rhythm: Normal rate and regular rhythm.   Pulmonary:      Effort: Pulmonary effort is normal. No respiratory distress.   Abdominal:      Comments: Midline incision with dressing in place, removed to reveal well approximated incision C/D/I  Appropriate raquel-incisional tenderness   Genitourinary:     Comments: Maldonado in place  Musculoskeletal:         General: No deformity. Normal range of motion.   Skin:     General: Skin is warm and dry.      Coloration: Skin is not jaundiced.   Neurological:      General: No focal deficit present.      Cranial Nerves: No cranial nerve deficit.   Psychiatric:      Comments: Unable to assess          Significant Labs:  I have reviewed all pertinent lab results within the past 24 hours.    Significant Diagnostics:  I have reviewed all pertinent imaging results/findings within the past 24 hours.  Assessment/Plan:     * Acute lower GI bleeding  Jessica Floyd is a 73 y.o. female who presented with a GIB s/p IR embolization of a tertiary branch of the ileal artery now with severe LLQ pain. She is now s/p diagnostic laparotomy converted to ex lap with small bowel resection and primary tissue repair of bilateral inguinal hernias.     - NPO  - mIVF  - NGT to [facility] for meds.  - Would hold off on enteral nutrition until has evidence of ROBF  - Abx per primary  - Okay for PT/OT. Would recommend early mobilization as tolerated    Dispo: Remainder of care per MICU        Kang Maddox MD  General Surgery  [facility] - Cardiac Medical ICU

## 2024-05-19 NOTE — ASSESSMENT & PLAN NOTE
Jessica Floyd is a 73 y.o. female who presented with a GIB s/p IR embolization of a tertiary branch of the ileal artery now with severe LLQ pain. She is now s/p diagnostic laparotomy converted to ex lap with small bowel resection and primary tissue repair of bilateral inguinal hernias.     - NPO  - mIVF  - NGT to [facility] for meds.  - Would hold off on enteral nutrition until has evidence of ROBF  - Abx per primary  - Okay for PT/OT. Would recommend early mobilization as tolerated    Dispo: Remainder of care per MICU

## 2024-05-19 NOTE — PT/OT/SLP EVAL
Occupational Therapy   Co-Evaluation/Treatment     Name: Jessica Floyd  MRN: [id number]  Admitting Diagnosis: Acute lower GI bleeding  Recent Surgery: Procedure(s) (LRB):  LAPAROSCOPY, DIAGNOSTIC (N/A)  LAPAROTOMY, EXPLORATORY (N/A)  EXCISION, SMALL INTESTINE (N/A)  REPAIR, HERNIA, INGUINAL (Bilateral) 2 Days Post-Op    Recommendations:     Discharge Recommendations: High Intensity Therapy  Discharge Equipment Recommendations:  shower chair  Barriers to discharge:  None    Assessment:     Jessica lFoyd is a 73 y.o. female with a medical diagnosis of Acute lower GI bleeding.  She presents with the following performance deficits affecting function: weakness, impaired endurance, decreased upper extremity function, impaired functional mobility, gait instability, impaired self care skills, impaired balance, decreased lower extremity function, pain, impaired cardiopulmonary response to activity, decreased ROM, impaired coordination, decreased coordination.      Pt with fair tolerance to the session this date. Pt fearful of movement and complaining of SOB, but all vital signs stable and RN present throughout. Pt sat EOB where she performed grooming with SBA to wash her face. Pt was able to transfer to the bedside chair with Mod A x 2 and RW where she was left to sit up in the chair. At this time she is far from her functional baseline of Mod I to perform mobility and independence for ADLs. Pt would benefit from continued skilled acute OT services during this admission in order to maximize independence and safety with ADLs and functional mobility to ensure safe return to PLOF in the least restrictive environment. Patient presents with good participation and motivation to return to prior level of function with high intensity therapy. The patient demonstrates appropriate endurance and strength to participate in up to 3 hours or 15hrs of combined therapy post acute.      Rehab Prognosis: Good; patient would benefit from acute  skilled OT services to address these deficits and reach maximum level of function.       Plan:     Patient to be seen 4 x/week to address the above listed problems via self-care/home management, therapeutic activities, therapeutic exercises, neuromuscular re-education  Plan of Care Expires: 06/19/24  Plan of Care Reviewed with: patient, spouse, daughter    Subjective     Chief Complaint: Pain  Patient/Family Comments/goals: To return to PLOF    Occupational Profile:  Living Environment: Pt lives with her  in a [facility] with 3 EMILY and B HR. She has a walk-in shower with a bench and GB present.   Previous level of function: Independent with ADLs and Mod I with rollator for mobility more recently.   Equipment Used at Home: cane, straight, rollator, wheelchair, oxygen, walker, rolling, raised toilet  Assistance upon Discharge: Pt will have assistance from her      Pain/Comfort:  Pain Rating 1: other (see comments) (not rated)  Location - Side 1: Bilateral  Location - Orientation 1: generalized  Location 1: abdomen  Pain Addressed 1: Reposition, Distraction, Cessation of Activity  Pain Rating Post-Intervention 1: other (see comments) (not rated)    Patients cultural, spiritual, Druze conflicts given the current situation: no    Objective:     Co-evaluation/treatment performed due to patient's multiple deficits requiring two skilled therapists to appropriately and safely assess patient's strength and endurance while facilitating functional tasks in addition to accommodating for patient's activity tolerance.     Communicated with: RN prior to session.  Patient found HOB elevated with arterial line, pulse ox (continuous), telemetry, blood pressure cuff, oxygen, NG tube, peripheral IV, abreu catheter upon OT entry to room.    General Precautions: Standard, fall  Orthopedic Precautions: N/A  Braces: N/A  Respiratory Status: Nasal cannula     Occupational Performance:    Bed Mobility:    Patient completed  Rolling/Turning to Left with  maximal assistance  Patient completed Scooting/Bridging with maximal assistance and 2 persons  Patient completed Supine to Sit with maximal assistance and 2 persons  Pt sat EOB with SBA- CGA     Functional Mobility/Transfers:  Patient completed Sit <> Stand Transfer with moderate assistance and of 2 persons  with  rolling walker   Patient completed Bed <> Chair Transfer using Step Transfer technique with moderate assistance and of 2 persons with rolling walker    Activities of Daily Living:  Grooming: stand by assistance : to wash her face sitting EOB with use of the RUE   Lower Body Dressing: total assistance : to don socks at bed level     Cognitive/Visual Perceptual:  Cognitive/Psychosocial Skills:     -       Follows Commands/attention:Follows multistep  commands  -       Safety awareness/insight to disability: impaired   -       Mood/Affect/Coping skills/emotional control: Tearful  Visual/Perceptual:      -Intact visual field     Physical Exam:  Balance:  Static Sitting   stand by assistance   Dynamic Sitting   stand by assistance and contact guard assistance   Static Standing   moderate assistance   Dynamic Standing   moderate assistance and of 2 persons     Upper Extremity Function:   Dominance: Right   Left UE Right UE   UE Edema None noted None noted   UE ROM WFL except sh flexion WFL except Sh flexion    UE Strength Sh 3-/4  Elbow 4/5 Sh 3-/4  Elbow 4/5    Strength WFL WFL   Sensation    -       Intact    -       Intact   Fine Motor Skills:     -       Intact    -       Intact   Gross Motor Skills:   WFL   WFL         AMPAC 6 Click ADL:  AMPAC Total Score: 14    Treatment & Education:  Therapist provided facilitation and instruction of proper body mechanics and fall prevention strategies during tasks listed above.  Instructed patient to sit in bedside chair daily to increase OOB/activity tolerance.  Instructed patient to use call light to have nursing staff assist with  needs/transfers.  Discussed OT POC and answered all questions within OT scope of practice.  Whiteboard updated       Patient left up in chair with all lines intact, call button in reach, RN notified, and family present    GOALS:   Multidisciplinary Problems       Occupational Therapy Goals          Problem: Occupational Therapy    Goal Priority Disciplines Outcome Interventions   Occupational Therapy Goal     OT, PT/OT Progressing    Description: Goals to be met by: 6/2/24     Patient will increase functional independence with ADLs by performing:    UE Dressing with Stand-by Assistance.  LE Dressing with Minimal Assistance.  Grooming while standing at sink with Minimal Assistance.  Toileting from toilet with Minimal Assistance for hygiene and clothing management.   Toilet transfer to toilet with Minimal Assistance.                         History:     Past Medical History:   Diagnosis Date    Allergic rhinitis     Amblyopia     Carotid stenosis     Coronary atherosclerosis     Outside [facility] 6/2014: 20% mLAD, 50% mCx, 20%, mRCA    Dyslipidemia     ESBL (extended spectrum beta-lactamase) producing bacteria infection     UTI    Hypertension     Nausea and vomiting 05/26/2017    Pulmonary emphysema 10/28/2023    SAH (subarachnoid hemorrhage) 08/24/2016 1999, s/p clipping    Subarachnoid hemorrhage due to ruptured aneurysm 1999         Past Surgical History:   Procedure Laterality Date    ADENOIDECTOMY      ANTERIOR CRUCIATE LIGAMENT REPAIR  2012    APPENDECTOMY      CATARACT EXTRACTION W/  INTRAOCULAR LENS IMPLANT Right 11/07/2022    Procedure: EXTRACTION, CATARACT, WITH IOL INSERTION;  Surgeon: Higinio Cristina MD;  Location: [facility] OR;  Service: Ophthalmology;  Laterality: Right;    CATARACT EXTRACTION W/  INTRAOCULAR LENS IMPLANT Left 11/21/2022    Procedure: EXTRACTION, CATARACT, WITH IOL INSERTION;  Surgeon: Higinio Cristina MD;  Location: [facility] OR;  Service: Ophthalmology;  Laterality: Left;    CEREBRAL ANEURYSM REPAIR   1999    clip    COLONOSCOPY N/A 5/16/2024    Procedure: COLONOSCOPY;  Surgeon: Rich Syed MD;  [facility]  Service: Endoscopy;  Laterality: N/A;    DENTAL SURGERY      DIAGNOSTIC LAPAROSCOPY N/A 5/17/2024    Procedure: LAPAROSCOPY, DIAGNOSTIC;  Surgeon: Ruben Oscar MD;  [facility];  Service: General;  Laterality: N/A;    ESOPHAGOGASTRODUODENOSCOPY N/A 5/15/2024    Procedure: EGD (ESOPHAGOGASTRODUODENOSCOPY);  Surgeon: Rich Syed MD;  [facility]  Service: Endoscopy;  Laterality: N/A;    EXCISION, SMALL INTESTINE N/A 5/17/2024    Procedure: EXCISION, SMALL INTESTINE;  Surgeon: Ruben Oscar MD;  Location: [facility] OR [address] [place] [address];  Service: General;  Laterality: N/A;    EYE SURGERY Bilateral     cataract removal and lens implants    HIP ARTHROPLASTY Left 05/28/2023    Procedure: TOTAL HIP ARTHROPLASTY;  Surgeon: Juan Wan MD;  [facility];  Service: Orthopedics;  Laterality: Left;    LAPAROTOMY, EXPLORATORY N/A 5/17/2024    Procedure: LAPAROTOMY, EXPLORATORY;  Surgeon: Ruben Oscar MD;  Location: [facility] OR [address] [place] [address];  Service: General;  Laterality: N/A;    REPAIR, HERNIA, INGUINAL Bilateral 5/17/2024    Procedure: REPAIR, HERNIA, INGUINAL;  Surgeon: Ruben Oscar MD;  Location: [facility] OR [address] [place] [address];  Service: General;  Laterality: Bilateral;    TONSILLECTOMY         Time Tracking:     OT Date of Treatment: 05/19/24  OT Start Time: 0840  OT Stop Time: 0903  OT Total Time (min): 23 min    Billable Minutes:Evaluation 10  Therapeutic Activity 13    5/19/2024

## 2024-05-19 NOTE — PLAN OF CARE
Problem: Adult Inpatient Plan of Care  Goal: Plan of Care Review  Outcome: Progressing  Goal: Patient-Specific Goal (Individualized)  Outcome: Progressing  Goal: Absence of Hospital-Acquired Illness or Injury  Outcome: Progressing  Goal: Optimal Comfort and Wellbeing  Outcome: Progressing  Goal: Readiness for Transition of Care  Outcome: Progressing     Problem: Wound  Goal: Optimal Coping  Outcome: Progressing  Goal: Optimal Functional Ability  Outcome: Progressing  Goal: Absence of Infection Signs and Symptoms  Outcome: Progressing  Goal: Improved Oral Intake  Outcome: Progressing  Goal: Optimal Pain Control and Function  Outcome: Progressing  Goal: Skin Health and Integrity  Outcome: Progressing  Goal: Optimal Wound Healing  Outcome: Progressing     Problem: Fall Injury Risk  Goal: Absence of Fall and Fall-Related Injury  Outcome: Progressing     Problem: Skin Injury Risk Increased  Goal: Skin Health and Integrity  Outcome: Progressing     Problem: Infection  Goal: Absence of Infection Signs and Symptoms  Outcome: Progressing

## 2024-05-19 NOTE — SUBJECTIVE & OBJECTIVE
Interval History: Ms. Floyd is accompanied by her  this morning who states she was able to get some sleep overnight. She endorses appropriate raquel-incisional pain. No ROBF at this time.     Medications:  Continuous Infusions:   dexmedetomidine (PRECEDEX) infusion (non-titrating)  0.4 mcg/kg/hr Intravenous Continuous 5.3 mL/hr at 05/19/24 0601 0.4 mcg/kg/hr at 05/19/24 0601     Scheduled Meds:   LIDOcaine  1 patch Transdermal Q24H    magnesium sulfate IVPB  2 g Intravenous Once    pantoprazole  40 mg Intravenous Daily    piperacillin-tazobactam (Zosyn) IV (PEDS and ADULTS) (extended infusion is not appropriate)  4.5 g Intravenous Q8H    potassium chloride  10 mEq Intravenous Q1H     PRN Meds:  Current Facility-Administered Medications:     albuterol-ipratropium, 3 mL, Nebulization, Q6H PRN    ondansetron, 4 mg, Intravenous, Q4H PRN    sodium chloride, 1 spray, Each Nostril, PRN    sodium chloride 0.9%, 10 mL, Intravenous, PRN     Review of patient's allergies indicates:  No Known Allergies  Objective:     Vital Signs (Most Recent):  Temp: 97.7 °F (36.5 °C) (05/19/24 0300)  Pulse: 70 (05/19/24 0600)  Resp: (!) 40 (05/19/24 0600)  BP: (!) 146/62 (05/18/24 0800)  SpO2: (!) 93 % (05/19/24 0600) Vital Signs (24h Range):  Temp:  [97.7 °F (36.5 °C)-99 °F (37.2 °C)] 97.7 °F (36.5 °C)  Pulse:  [] 70  Resp:  [13-61] 40  SpO2:  [86 %-100 %] 93 %  Arterial Line BP: ()/(47-79) 119/58     Weight: 52.6 kg (115 lb 15.4 oz)  Body mass index is 20.54 kg/m².    Intake/Output - Last 3 Shifts         05/17 0700 05/18 0659 05/18 0700 05/19 0659 05/19 0700 05/20 0659    I.V. (mL/kg) 48 (0.9) 46.7 (0.9)     Blood 98141.8      IV Piggyback 1933 698.3     Total Intake(mL/kg) 15227.8 (254) 745 (14.2)     Urine (mL/kg/hr) 1900 (1.5) 1325 (1)     Total Output 1900 1325     Net +97417.8 -580                     Physical Exam  Vitals and nursing note reviewed.   Constitutional:       Appearance: She is well-developed. She is  not diaphoretic.      Interventions: Nasal cannula in place.      Comments: Appears uncomfortable but improved from prior   HENT:      Nose:      Comments: NGT in place     Mouth/Throat:      Pharynx: Oropharynx is clear. No oropharyngeal exudate.   Eyes:      General: No scleral icterus.     Extraocular Movements: Extraocular movements intact.   Cardiovascular:      Rate and Rhythm: Normal rate and regular rhythm.   Pulmonary:      Effort: Pulmonary effort is normal. No respiratory distress.   Abdominal:      Comments: Midline incision with dressing in place, removed to reveal well approximated incision C/D/I  Appropriate raquel-incisional tenderness   Genitourinary:     Comments: Maldonado in place  Musculoskeletal:         General: No deformity. Normal range of motion.   Skin:     General: Skin is warm and dry.      Coloration: Skin is not jaundiced.   Neurological:      General: No focal deficit present.      Cranial Nerves: No cranial nerve deficit.   Psychiatric:      Comments: Unable to assess          Significant Labs:  I have reviewed all pertinent lab results within the past 24 hours.    Significant Diagnostics:  I have reviewed all pertinent imaging results/findings within the past 24 hours.

## 2024-05-19 NOTE — PROGRESS NOTES
"[facility] - Cardiac Medical ICU  Critical Care Medicine  Progress Note    Patient Name: Jessica Floyd  MRN: [id number]  Admission Date: 5/14/2024  Hospital Length of Stay: 5 days  Code Status: Full Code  Attending Provider: Sachin Rich*  Primary Care Provider: Effie Olmedo MD   Principal Problem: Acute lower GI bleeding    Subjective:     HPI:  Mrs. Floyd is a 73 year old female with PMH notable for COPD on home 2L NC, pulm HTN, R carotid stent on ASA, HFpEF, anemia, and ERIK on infusions who presented to [facility] ED with the melena.  is at bedside and reports that the patient had an episode of diarrhea and lethargy yesterday. She reports that it felt as if "she was going to pass out", so she was using her walker to get around. Patient's  reports that this has never happened before. Patient denies excessive OTC NSAID use. She reports that she drinks 2 wine glasses a day. This morning when the patient woke up she felt very lethargic. Patient went to use the bathroom, and  noted bright red stool in the bowl. Patient reported a pre syncopal episode and felt lightheaded. She admits to weakness, SOB, light-headedness, hematochezia, and pre syncope. Patient denies nausea, vomiting, hematemesis, falls, confusion, chest pain, urinary changes, and fevers. Patient reports taking aspirin daily but no DOAC.     Hemoglobin 5.9 at presentation to ED (baseline ~10). GI and IR consulted given concern for active bleed seen on CTA. In the emergency department she was given 1L IVF and a 80 mg IV protonix bolus. Critical care medicine. consulted for GIB. CTA in the Ed revealed "Acute gastrointestinal bleed at the level of the cecum". IR planning for embolization today. Patient is also receiving 2 pRBC due to acute anemia.     Hospital/ICU Course:  Admitted to ICU for lower GIB s/p embolization with IR 5/14. Colonoscopy performed 5/16 without evidence of active bleed, old blood/clots evacuated. Patient is s/p " 8U pRBCs, 1 FFP, 1 platelet. Course complicated by worsening abdominal pain, general surgery consulted for further evaluation. Patient underwent ex lap 5/17 and found to have necrotic bowel at site of incarcerated hernia. On zosyn for intraabdominal coverage, BC NGTD. Patient oversedated on 5/18 and required BIPAP briefly for CO2 retention. Subsequently placed on precedex for agitation and IV tylenol for pain control, tolerating well.    Interval History/Significant Events: Patient oversedated on 5/18 and required BIPAP briefly for CO2 retention. Subsequently placed on precedex for agitation and IV tylenol for pain control, tolerating well.    Review of Systems   Unable to perform ROS: Mental status change     Objective:     Vital Signs (Most Recent):  Temp: 97.9 °F (36.6 °C) (05/19/24 0701)  Pulse: 89 (05/19/24 1000)  Resp: (!) 28 (05/19/24 1000)  BP: (!) 146/62 (05/18/24 0800)  SpO2: (!) 92 % (05/19/24 1000) Vital Signs (24h Range):  Temp:  [97.7 °F (36.5 °C)-99 °F (37.2 °C)] 97.9 °F (36.6 °C)  Pulse:  [] 89  Resp:  [13-61] 28  SpO2:  [82 %-100 %] 92 %  Arterial Line BP: (100-166)/(52-79) 140/75   Weight: 52.6 kg (115 lb 15.4 oz)  Body mass index is 20.54 kg/m².      Intake/Output Summary (Last 24 hours) at 5/19/2024 1222  Last data filed at 5/19/2024 0601  Gross per 24 hour   Intake 744.97 ml   Output 975 ml   Net -230.03 ml          Physical Exam  Vitals and nursing note reviewed.   Constitutional:       General: She is not in acute distress.  HENT:      Head: Normocephalic and atraumatic.      Mouth/Throat:      Mouth: Mucous membranes are dry.      Pharynx: Oropharynx is clear.   Eyes:      General: No scleral icterus.     Extraocular Movements: Extraocular movements intact.      Conjunctiva/sclera: Conjunctivae normal.   Cardiovascular:      Rate and Rhythm: Normal rate and regular rhythm.      Heart sounds: Normal heart sounds. No murmur heard.  Pulmonary:      Effort: Pulmonary effort is normal. No  respiratory distress.   Abdominal:      General: Abdomen is flat. There is no distension.      Palpations: Abdomen is soft.      Tenderness: There is abdominal tenderness (Mild). There is no guarding or rebound.      Comments: Less distension, surgical wound dressings clean and intact   Musculoskeletal:         General: No tenderness.      Cervical back: Normal range of motion and neck supple.      Right lower leg: No edema.      Left lower leg: No edema.   Skin:     General: Skin is warm and dry.      Coloration: Skin is not jaundiced or pale.   Neurological:      General: No focal deficit present.      Mental Status: She is oriented to person, place, and time. Mental status is at baseline.            Vents:  Oxygen Concentration (%): 70 (05/18/24 1335)  Lines/Drains/Airways       Drain  Duration                  Urethral Catheter 05/15/24 1600 3 days         NG/OG Tube 05/17/24 0930 16 Fr. Right nostril 2 days              Arterial Line  Duration             Arterial Line 05/17/24 1919 Left Radial 1 day              Peripheral Intravenous Line  Duration                  Peripheral IV - Single Lumen 05/18/24 0715 20 G;1 3/4 in Anterior;Right Forearm 1 day         Peripheral IV - Single Lumen 05/18/24 0900 20 G;1 3/4 in Anterior;Left Upper Arm 1 day                  Significant Labs:    CBC/Anemia Profile:  Recent Labs   Lab 05/18/24  1229 05/18/24  1559 05/18/24  2140 05/19/24  0850   WBC 32.07*  --  28.31* 22.67*   HGB 9.2*  --  8.7* 9.0*   HCT 27.4* 26* 26.2* 28.1*     --  176 168   MCV 92  --  91 94   RDW 17.6*  --  17.9* 18.7*        Chemistries:  Recent Labs   Lab 05/17/24  2114 05/18/24  0453 05/19/24  0417   * 149* 148*   K 4.1  4.2 3.5 3.3*   * 114* 114*   CO2 21* 24 26   BUN 16 17 15   CREATININE 0.8 0.9 0.7   CALCIUM 8.3* 8.2* 7.7*   ALBUMIN 2.0* 1.9* 1.6*   PROT 5.3* 4.8* 4.5*   BILITOT 1.3* 1.3* 0.9   ALKPHOS 102 100 80   ALT 38 43 54*   * 135* 161*   MG 2.6 2.3 1.9   PHOS  --   4.7* 3.4       CMP:   Recent Labs   Lab 05/17/24  2114 05/18/24  0453 05/19/24  0417   * 149* 148*   K 4.1  4.2 3.5 3.3*   * 114* 114*   CO2 21* 24 26    99 109   BUN 16 17 15   CREATININE 0.8 0.9 0.7   CALCIUM 8.3* 8.2* 7.7*   PROT 5.3* 4.8* 4.5*   ALBUMIN 2.0* 1.9* 1.6*   BILITOT 1.3* 1.3* 0.9   ALKPHOS 102 100 80   * 135* 161*   ALT 38 43 54*   ANIONGAP 13 11 8       Significant Imaging:  I have reviewed all pertinent imaging results/findings within the past 24 hours.    ABG  Recent Labs   Lab 05/19/24  0501   PH 7.368   PO2 96   PCO2 51.5*   HCO3 29.6*   BE 4*     Assessment/Plan:     Pulmonary  Pulmonary emphysema  Not on oxygen at home.     -Home inhalers held in the setting of worsening ileus, resume when appropriate   -Duonebs PRN    Cardiac/Vascular  (HFpEF) heart failure with preserved ejection fraction  Noted in history but last echo reveals normal function. Not on home medications.    Transthoracic echo (TTE) complete 10/29/2023    Interpretation Summary    Left Ventricle: The left ventricle is normal in size. Normal wall thickness. Normal wall motion. There is normal systolic function with a visually estimated ejection fraction of 60 - 65%. There is normal diastolic function.    Right Ventricle: Mild right ventricular enlargement. Wall thickness is normal. Right ventricle wall motion  is normal. Systolic function is normal.    Aortic Valve: The aortic valve is a unicuspid valve. There is mild aortic valve sclerosis. There is trace aortic regurgitation.    Mitral Valve: There is mild regurgitation.    Pulmonary Artery: There is mild pulmonary hypertension. The estimated pulmonary artery systolic pressure is 40 mmHg.    IVC/SVC: Intermediate venous pressure at 8 mmHg.        Hypertension  Hypertension Medications                    amLODIPine (NORVASC) 5 MG tablet TAKE 1 TABLET BY MOUTH EVERY DAY     carvediloL (COREG) 6.25 MG tablet Take 1 tablet (6.25 mg total) by mouth 2  (two) times daily with meals.     hydroCHLOROthiazide (HYDRODIURIL) 25 MG tablet Take 1 tablet (25 mg total) by mouth once daily.       -Will hold antihypertensives in setting of acute bleed and hypotension    Bilateral carotid artery stenosis  April 2022     No evidence of hemodynamically significant stenosis is demonstrated in the extracranial carotid arteries.  Patent right carotid stent    Renal/  Hypernatremia  In the setting of decreased PO intake for multiple days, 1.5L deficit    - Gentle IVF x 10hrs    Oncology  Leukocytosis  Leukocytosis on admission, initially thought to be acute phase reactant to GIB as patient afebrile.   - Started on IV Zosyn for concern for intraabdominal infection given worsening abdominal pain, distension  - Bcx2 NGTD    GI  * Acute lower GI bleeding  73 year old female with multiple medical problems presenting with melena and some bright red blood with associated pre-syncope found to have hgb of 5.9 (from baseline ~10). CTA with acute gastrointestinal bleed at the level of the cecum. Patient had embolization by IR but continued to to have larger volume dark red blood in stool with falling hgb, repeat CTA without active bleed.     S/p 9U pRBCs, 1 FFP, 1 platelet      -- Colonoscopy performed 5/16 with no evidence of active bleed, old blood and clots evacuated  -- IR consulted, embolization performed 5/14  -- NPO   -- Protonix 40 mg qd  -- Hold all A/C and A/P agents   -- Correct any coagulopathy with platelets and FFP for goal of platelets > 50K and INR <2.0   -- Maintain large bore IV access   -- MAP > 65 goal   -- Maintain type and screen   -- Trend CBCs    Incarcerated inguinal hernia  See bowel ischemia    Ischemia, bowel  Pain out of proportion to exam  S/p ex lap 5/17 with incarcerated hernia repair with bowel resection per general surgery  General surgery consulted, following  H/H stable    Ileus  Noted on CT A/P  NGT placed to suction 5/17    Other  History Situational (ie  Stress Induced) syncope ([facility] admission 12-25-23  History of syncope. Thought to be reflex mediated. Had pre-syncopal episode at home after having diarrhea but before she noticed active bleeding.     Critical secondary to Patient has a condition that poses threat to life and bodily function: Agitation       Critical care was time spent personally by me on the following activities: development of treatment plan with patient or surrogate and bedside caregivers, discussions with consultants, evaluation of patient's response to treatment, examination of patient, ordering and performing treatments and interventions, ordering and review of laboratory studies, ordering and review of radiographic studies, pulse oximetry, re-evaluation of patient's condition. This critical care time did not overlap with that of any other provider or involve time for any procedures.     Alfredito Cobb MD  Critical Care Medicine  [facility] - Cardiac Medical ICU

## 2024-05-19 NOTE — PLAN OF CARE
Problem: Occupational Therapy  Goal: Occupational Therapy Goal  Description: Goals to be met by: 6/2/24     Patient will increase functional independence with ADLs by performing:    UE Dressing with Stand-by Assistance.  LE Dressing with Minimal Assistance.  Grooming while standing at sink with Minimal Assistance.  Toileting from toilet with Minimal Assistance for hygiene and clothing management.   Toilet transfer to toilet with Minimal Assistance.    Outcome: Progressing

## 2024-05-20 PROBLEM — M79.605 LEFT LEG PAIN: Status: ACTIVE | Noted: 2024-05-20

## 2024-05-20 LAB
ALBUMIN SERPL BCP-MCNC: 1.6 G/DL (ref 3.5–5.2)
ALLENS TEST: ABNORMAL
ALP SERPL-CCNC: 92 U/L (ref 55–135)
ALT SERPL W/O P-5'-P-CCNC: 62 U/L (ref 10–44)
ANION GAP SERPL CALC-SCNC: 9 MMOL/L (ref 8–16)
ANISOCYTOSIS BLD QL SMEAR: SLIGHT
AST SERPL-CCNC: 149 U/L (ref 10–40)
BASO STIPL BLD QL SMEAR: ABNORMAL
BASOPHILS # BLD AUTO: 0.03 K/UL (ref 0–0.2)
BASOPHILS # BLD AUTO: 0.19 K/UL (ref 0–0.2)
BASOPHILS NFR BLD: 0.2 % (ref 0–1.9)
BASOPHILS NFR BLD: 1.2 % (ref 0–1.9)
BILIRUB SERPL-MCNC: 0.8 MG/DL (ref 0.1–1)
BUN SERPL-MCNC: 10 MG/DL (ref 8–23)
CALCIUM SERPL-MCNC: 7.3 MG/DL (ref 8.7–10.5)
CHLORIDE SERPL-SCNC: 112 MMOL/L (ref 95–110)
CO2 SERPL-SCNC: 26 MMOL/L (ref 23–29)
CREAT SERPL-MCNC: 0.7 MG/DL (ref 0.5–1.4)
DELSYS: ABNORMAL
DIFFERENTIAL METHOD BLD: ABNORMAL
DIFFERENTIAL METHOD BLD: ABNORMAL
EOSINOPHIL # BLD AUTO: 0.7 K/UL (ref 0–0.5)
EOSINOPHIL # BLD AUTO: 1 K/UL (ref 0–0.5)
EOSINOPHIL NFR BLD: 4.3 % (ref 0–8)
EOSINOPHIL NFR BLD: 5.7 % (ref 0–8)
ERYTHROCYTE [DISTWIDTH] IN BLOOD BY AUTOMATED COUNT: 18.1 % (ref 11.5–14.5)
ERYTHROCYTE [DISTWIDTH] IN BLOOD BY AUTOMATED COUNT: 18.5 % (ref 11.5–14.5)
EST. GFR  (NO RACE VARIABLE): >60 ML/MIN/1.73 M^2
GLUCOSE SERPL-MCNC: 100 MG/DL (ref 70–110)
GLUCOSE SERPL-MCNC: 106 MG/DL (ref 70–110)
HCO3 UR-SCNC: 20.2 MMOL/L (ref 24–28)
HCO3 UR-SCNC: 27.9 MMOL/L (ref 24–28)
HCT VFR BLD AUTO: 29 % (ref 37–48.5)
HCT VFR BLD AUTO: 29.1 % (ref 37–48.5)
HCT VFR BLD CALC: 21 %PCV (ref 36–54)
HCT VFR BLD CALC: 22 %PCV (ref 36–54)
HGB BLD-MCNC: 9.3 G/DL (ref 12–16)
HGB BLD-MCNC: 9.3 G/DL (ref 12–16)
IMM GRANULOCYTES # BLD AUTO: 0.63 K/UL (ref 0–0.04)
IMM GRANULOCYTES # BLD AUTO: 0.82 K/UL (ref 0–0.04)
IMM GRANULOCYTES NFR BLD AUTO: 3.9 % (ref 0–0.5)
IMM GRANULOCYTES NFR BLD AUTO: 4.7 % (ref 0–0.5)
INR PPP: 1 (ref 0.8–1.2)
LYMPHOCYTES # BLD AUTO: 0.9 K/UL (ref 1–4.8)
LYMPHOCYTES # BLD AUTO: 1.3 K/UL (ref 1–4.8)
LYMPHOCYTES NFR BLD: 5.4 % (ref 18–48)
LYMPHOCYTES NFR BLD: 8.3 % (ref 18–48)
MAGNESIUM SERPL-MCNC: 2.1 MG/DL (ref 1.6–2.6)
MCH RBC QN AUTO: 30.1 PG (ref 27–31)
MCH RBC QN AUTO: 30.2 PG (ref 27–31)
MCHC RBC AUTO-ENTMCNC: 32 G/DL (ref 32–36)
MCHC RBC AUTO-ENTMCNC: 32.1 G/DL (ref 32–36)
MCV RBC AUTO: 94 FL (ref 82–98)
MCV RBC AUTO: 95 FL (ref 82–98)
MONOCYTES # BLD AUTO: 1.4 K/UL (ref 0.3–1)
MONOCYTES # BLD AUTO: 1.7 K/UL (ref 0.3–1)
MONOCYTES NFR BLD: 10.6 % (ref 4–15)
MONOCYTES NFR BLD: 8 % (ref 4–15)
NEUTROPHILS # BLD AUTO: 11.6 K/UL (ref 1.8–7.7)
NEUTROPHILS # BLD AUTO: 13.3 K/UL (ref 1.8–7.7)
NEUTROPHILS NFR BLD: 71.7 % (ref 38–73)
NEUTROPHILS NFR BLD: 76 % (ref 38–73)
NRBC BLD-RTO: 1 /100 WBC
NRBC BLD-RTO: 1 /100 WBC
OVALOCYTES BLD QL SMEAR: ABNORMAL
PCO2 BLDA: 26.5 MMHG (ref 35–45)
PCO2 BLDA: 50.4 MMHG (ref 35–45)
PH SMN: 7.35 [PH] (ref 7.35–7.45)
PH SMN: 7.49 [PH] (ref 7.35–7.45)
PHOSPHATE SERPL-MCNC: 2.2 MG/DL (ref 2.7–4.5)
PLATELET # BLD AUTO: 195 K/UL (ref 150–450)
PLATELET # BLD AUTO: 205 K/UL (ref 150–450)
PMV BLD AUTO: 10 FL (ref 9.2–12.9)
PMV BLD AUTO: 9.4 FL (ref 9.2–12.9)
PO2 BLDA: 200 MMHG (ref 80–100)
PO2 BLDA: 69 MMHG (ref 80–100)
POC BE: -3 MMOL/L
POC BE: 2 MMOL/L
POC IONIZED CALCIUM: 0.88 MMOL/L (ref 1.06–1.42)
POC IONIZED CALCIUM: 1.04 MMOL/L (ref 1.06–1.42)
POC SATURATED O2: 100 % (ref 95–100)
POC SATURATED O2: 95 % (ref 95–100)
POC TCO2: 21 MMOL/L (ref 23–27)
POC TCO2: 29 MMOL/L (ref 23–27)
POIKILOCYTOSIS BLD QL SMEAR: SLIGHT
POLYCHROMASIA BLD QL SMEAR: ABNORMAL
POTASSIUM BLD-SCNC: 3 MMOL/L (ref 3.5–5.1)
POTASSIUM BLD-SCNC: 3.6 MMOL/L (ref 3.5–5.1)
POTASSIUM SERPL-SCNC: 3.3 MMOL/L (ref 3.5–5.1)
PROT SERPL-MCNC: 4.7 G/DL (ref 6–8.4)
PROTHROMBIN TIME: 10.6 SEC (ref 9–12.5)
RBC # BLD AUTO: 3.08 M/UL (ref 4–5.4)
RBC # BLD AUTO: 3.09 M/UL (ref 4–5.4)
SAMPLE: ABNORMAL
SAMPLE: ABNORMAL
SITE: ABNORMAL
SODIUM BLD-SCNC: 147 MMOL/L (ref 136–145)
SODIUM BLD-SCNC: 149 MMOL/L (ref 136–145)
SODIUM SERPL-SCNC: 147 MMOL/L (ref 136–145)
TOXIC GRANULES BLD QL SMEAR: PRESENT
WBC # BLD AUTO: 16.16 K/UL (ref 3.9–12.7)
WBC # BLD AUTO: 17.49 K/UL (ref 3.9–12.7)

## 2024-05-20 PROCEDURE — 20000000 HC ICU ROOM: Mod: NTX

## 2024-05-20 PROCEDURE — 99292 CRITICAL CARE ADDL 30 MIN: CPT | Mod: GC,NTX,, | Performed by: INTERNAL MEDICINE

## 2024-05-20 PROCEDURE — 99024 POSTOP FOLLOW-UP VISIT: CPT | Mod: NTX,,, | Performed by: STUDENT IN AN ORGANIZED HEALTH CARE EDUCATION/TRAINING PROGRAM

## 2024-05-20 PROCEDURE — 94660 CPAP INITIATION&MGMT: CPT | Mod: NTX

## 2024-05-20 PROCEDURE — 63600175 PHARM REV CODE 636 W HCPCS: Mod: NTX | Performed by: INTERNAL MEDICINE

## 2024-05-20 PROCEDURE — 97110 THERAPEUTIC EXERCISES: CPT | Mod: NTX

## 2024-05-20 PROCEDURE — 97530 THERAPEUTIC ACTIVITIES: CPT | Mod: NTX

## 2024-05-20 PROCEDURE — 84132 ASSAY OF SERUM POTASSIUM: CPT | Mod: NTX

## 2024-05-20 PROCEDURE — 85014 HEMATOCRIT: CPT | Mod: NTX

## 2024-05-20 PROCEDURE — 84295 ASSAY OF SERUM SODIUM: CPT | Mod: NTX

## 2024-05-20 PROCEDURE — 25000003 PHARM REV CODE 250: Mod: NTX

## 2024-05-20 PROCEDURE — 94761 N-INVAS EAR/PLS OXIMETRY MLT: CPT | Mod: NTX

## 2024-05-20 PROCEDURE — 85610 PROTHROMBIN TIME: CPT | Mod: NTX

## 2024-05-20 PROCEDURE — 99222 1ST HOSP IP/OBS MODERATE 55: CPT | Mod: NTX,,, | Performed by: NURSE PRACTITIONER

## 2024-05-20 PROCEDURE — 99900035 HC TECH TIME PER 15 MIN (STAT): Mod: NTX

## 2024-05-20 PROCEDURE — 27000646 HC AEROBIKA DEVICE: Mod: NTX

## 2024-05-20 PROCEDURE — 94667 MNPJ CHEST WALL 1ST: CPT | Mod: NTX

## 2024-05-20 PROCEDURE — 99291 CRITICAL CARE FIRST HOUR: CPT | Mod: GC,NTX,, | Performed by: INTERNAL MEDICINE

## 2024-05-20 PROCEDURE — 80053 COMPREHEN METABOLIC PANEL: CPT | Mod: NTX

## 2024-05-20 PROCEDURE — 94664 DEMO&/EVAL PT USE INHALER: CPT | Mod: NTX

## 2024-05-20 PROCEDURE — 63600175 PHARM REV CODE 636 W HCPCS: Mod: NTX

## 2024-05-20 PROCEDURE — 37799 UNLISTED PX VASCULAR SURGERY: CPT | Mod: NTX

## 2024-05-20 PROCEDURE — 94799 UNLISTED PULMONARY SVC/PX: CPT | Mod: NTX

## 2024-05-20 PROCEDURE — 83735 ASSAY OF MAGNESIUM: CPT | Mod: NTX

## 2024-05-20 PROCEDURE — 97112 NEUROMUSCULAR REEDUCATION: CPT | Mod: NTX

## 2024-05-20 PROCEDURE — 25000242 PHARM REV CODE 250 ALT 637 W/ HCPCS: Mod: NTX | Performed by: INTERNAL MEDICINE

## 2024-05-20 PROCEDURE — C9113 INJ PANTOPRAZOLE SODIUM, VIA: HCPCS | Mod: NTX | Performed by: INTERNAL MEDICINE

## 2024-05-20 PROCEDURE — 82803 BLOOD GASES ANY COMBINATION: CPT | Mod: NTX

## 2024-05-20 PROCEDURE — 94668 MNPJ CHEST WALL SBSQ: CPT | Mod: NTX

## 2024-05-20 PROCEDURE — 25000242 PHARM REV CODE 250 ALT 637 W/ HCPCS: Mod: NTX

## 2024-05-20 PROCEDURE — 84100 ASSAY OF PHOSPHORUS: CPT | Mod: NTX

## 2024-05-20 PROCEDURE — 82330 ASSAY OF CALCIUM: CPT | Mod: NTX

## 2024-05-20 PROCEDURE — 94640 AIRWAY INHALATION TREATMENT: CPT | Mod: NTX

## 2024-05-20 PROCEDURE — 85025 COMPLETE CBC W/AUTO DIFF WBC: CPT | Mod: NTX

## 2024-05-20 PROCEDURE — 27000221 HC OXYGEN, UP TO 24 HOURS: Mod: NTX

## 2024-05-20 RX ORDER — DEXMEDETOMIDINE HYDROCHLORIDE 4 UG/ML
0-1.4 INJECTION, SOLUTION INTRAVENOUS CONTINUOUS
Status: DISCONTINUED | OUTPATIENT
Start: 2024-05-20 | End: 2024-05-21

## 2024-05-20 RX ORDER — IPRATROPIUM BROMIDE AND ALBUTEROL SULFATE 2.5; .5 MG/3ML; MG/3ML
3 SOLUTION RESPIRATORY (INHALATION) EVERY 4 HOURS PRN
Status: DISCONTINUED | OUTPATIENT
Start: 2024-05-20 | End: 2024-06-03 | Stop reason: HOSPADM

## 2024-05-20 RX ORDER — QUETIAPINE FUMARATE 25 MG/1
50 TABLET, FILM COATED ORAL ONCE AS NEEDED
Status: COMPLETED | OUTPATIENT
Start: 2024-05-20 | End: 2024-05-20

## 2024-05-20 RX ORDER — SODIUM,POTASSIUM PHOSPHATES 280-250MG
2 POWDER IN PACKET (EA) ORAL ONCE
Status: COMPLETED | OUTPATIENT
Start: 2024-05-20 | End: 2024-05-20

## 2024-05-20 RX ORDER — IPRATROPIUM BROMIDE AND ALBUTEROL SULFATE 2.5; .5 MG/3ML; MG/3ML
3 SOLUTION RESPIRATORY (INHALATION)
Status: DISCONTINUED | OUTPATIENT
Start: 2024-05-20 | End: 2024-05-23

## 2024-05-20 RX ORDER — CYANOCOBALAMIN 1000 UG/ML
1000 INJECTION, SOLUTION INTRAMUSCULAR; SUBCUTANEOUS WEEKLY
Status: DISCONTINUED | OUTPATIENT
Start: 2024-05-20 | End: 2024-06-03 | Stop reason: HOSPADM

## 2024-05-20 RX ORDER — ENOXAPARIN SODIUM 100 MG/ML
40 INJECTION SUBCUTANEOUS EVERY 24 HOURS
Status: DISCONTINUED | OUTPATIENT
Start: 2024-05-20 | End: 2024-05-23

## 2024-05-20 RX ADMIN — IPRATROPIUM BROMIDE AND ALBUTEROL SULFATE 3 ML: 2.5; .5 SOLUTION RESPIRATORY (INHALATION) at 10:05

## 2024-05-20 RX ADMIN — ENOXAPARIN SODIUM 40 MG: 40 INJECTION SUBCUTANEOUS at 04:05

## 2024-05-20 RX ADMIN — CYANOCOBALAMIN 1000 MCG: 1000 INJECTION INTRAMUSCULAR; SUBCUTANEOUS at 05:05

## 2024-05-20 RX ADMIN — ACETAMINOPHEN 650 MG: 325 TABLET ORAL at 12:05

## 2024-05-20 RX ADMIN — IPRATROPIUM BROMIDE AND ALBUTEROL SULFATE 3 ML: 2.5; .5 SOLUTION RESPIRATORY (INHALATION) at 08:05

## 2024-05-20 RX ADMIN — PIPERACILLIN SODIUM AND TAZOBACTAM SODIUM 4.5 G: 4; .5 INJECTION, POWDER, FOR SOLUTION INTRAVENOUS at 07:05

## 2024-05-20 RX ADMIN — DEXMEDETOMIDINE HYDROCHLORIDE 0.5 MCG/KG/HR: 4 INJECTION INTRAVENOUS at 06:05

## 2024-05-20 RX ADMIN — PIPERACILLIN SODIUM AND TAZOBACTAM SODIUM 4.5 G: 4; .5 INJECTION, POWDER, FOR SOLUTION INTRAVENOUS at 04:05

## 2024-05-20 RX ADMIN — QUETIAPINE FUMARATE 50 MG: 25 TABLET ORAL at 08:05

## 2024-05-20 RX ADMIN — QUETIAPINE FUMARATE 50 MG: 25 TABLET ORAL at 10:05

## 2024-05-20 RX ADMIN — IPRATROPIUM BROMIDE AND ALBUTEROL SULFATE 3 ML: 2.5; .5 SOLUTION RESPIRATORY (INHALATION) at 04:05

## 2024-05-20 RX ADMIN — LIDOCAINE 1 PATCH: 700 PATCH TOPICAL at 08:05

## 2024-05-20 RX ADMIN — PANTOPRAZOLE SODIUM 40 MG: 40 INJECTION, POWDER, FOR SOLUTION INTRAVENOUS at 08:05

## 2024-05-20 RX ADMIN — POTASSIUM BICARBONATE 40 MEQ: 391 TABLET, EFFERVESCENT ORAL at 07:05

## 2024-05-20 RX ADMIN — PIPERACILLIN SODIUM AND TAZOBACTAM SODIUM 4.5 G: 4; .5 INJECTION, POWDER, FOR SOLUTION INTRAVENOUS at 11:05

## 2024-05-20 RX ADMIN — PIPERACILLIN SODIUM AND TAZOBACTAM SODIUM 4.5 G: 4; .5 INJECTION, POWDER, FOR SOLUTION INTRAVENOUS at 12:05

## 2024-05-20 RX ADMIN — ACETAMINOPHEN 650 MG: 325 TABLET ORAL at 06:05

## 2024-05-20 RX ADMIN — POTASSIUM & SODIUM PHOSPHATES POWDER PACK 280-160-250 MG 2 PACKET: 280-160-250 PACK at 06:05

## 2024-05-20 RX ADMIN — LIDOCAINE 1 PATCH: 700 PATCH TOPICAL at 12:05

## 2024-05-20 RX ADMIN — POTASSIUM BICARBONATE 40 MEQ: 391 TABLET, EFFERVESCENT ORAL at 06:05

## 2024-05-20 NOTE — ASSESSMENT & PLAN NOTE
Patient with Hypercapnic and Hypoxic Respiratory failure which is Acute.  she is on home oxygen at 2 LPM. Signs/symptoms of respiratory failure include- respiratory distress. Contributing diagnoses includes - COPD and oversedation.  Labs and images were reviewed. Patient Has recent ABG, which has been reviewed. Will treat underlying causes and adjust management of respiratory failure as follows: Patient required intermittent short term BIPAP, now satting well on 4L NC.    - Repeat ABG  - Scheduled Duonebs  - Chest physiotherapy  - Acapella  - IS

## 2024-05-20 NOTE — CONSULTS
Inpatient consult to Physical Medicine Rehab  Consult performed by: Faina Prado NP  Consult ordered by: Edouard Giles MD      Consult received.      Faina Prado NP  Physical Medicine & Rehabilitation   05/20/2024

## 2024-05-20 NOTE — PT/OT/SLP PROGRESS
Physical Therapy   Progress Note    Patient Name:  Jessica Floyd  MRN: [id number]    Admit Date: 5/14/2024  Admitting Diagnosis:  Acute lower GI bleeding  Length of Stay: 6 days  Recent Surgery: Procedure(s) (LRB):  LAPAROSCOPY, DIAGNOSTIC (N/A)  LAPAROTOMY, EXPLORATORY (N/A)  EXCISION, SMALL INTESTINE (N/A)  REPAIR, HERNIA, INGUINAL (Bilateral) 3 Days Post-Op    Recommendations:     Discharge Recommendations: high intensity therapy  Equipment recommendations: shower chair  Barriers to discharge: Increased level of skilled assistance required and Fall risk     Assessment:     Jessica Floyd is a 73 y.o. female admitted to [facility] on 5/14/2024 with medical diagnosis of Acute lower GI bleeding. Pt presents with weakness, impaired endurance, impaired self care skills, impaired functional mobility, impaired balance, decreased coordination, decreased safety awareness, visual deficits, gait instability, impaired coordination, impaired cardiopulmonary response to activity. Pt is progressing towards goals, but has not yet reached prior level of function.     Pt agreeable to therapy session with  at bedside. Pt requires significant assist for bed mobility with decreased initiation. Once eob, pt states she is feeling anxious with increased WOB noted. Reviewed and practiced pursed lip breathing. Pt then transferred to the recliner with no steps taken. Upon sitting, therapist noted that IV in LUE was unscrewed and bleeding. Pressure immediately applied and nsg notified. Nsg arrived and managed IV. Pt experienced another episode of anxiety in chair; reviewed pursed lip breathing. Pt left in recliner with  and nsg in room; all VSS.     Jessica Floyd would benefit from continued acute PT intervention to improve quality of life, focus on recovery of impairments, provide patient/caregiver education, reduce fall risk, and maximize (I) and safety with functional mobility. Once medically stable, recommending pt discharge to high  intensity therapy. Patient has demonstrated sufficient progression to warrant high intensity therapy evidenced by objectives noted below. .      Rehab Prognosis: Fair    Plan:     During this hospitalization, patient to be seen 4 x/week to address the identified rehab impairments via gait training, therapeutic activities, therapeutic exercises, neuromuscular re-education and progress towards stated goals.     Plan of Care Expires:  06/18/24  Plan of Care reviewed with: patient and spouse    This plan of care has been discussed with the patient/caregiver, who was included in its development and is in agreement with the identified goals and treatment plan.     Subjective     Communicated with RN prior to session.  Patient found supine upon PT entry to room, agreeable to therapy session. Pt's  present during session.    Patient/Family Comments/goals: none stated    Pain/Comfort:  Pain Rating 1: 0/10  Pain Rating Post-Intervention 1: 0/10    Patients cultural, spiritual, Oriental orthodox conflicts given the current situation: None identified     Objective:   OT present for cotreat due to pt's multiple medical comorbidities and functional/cognition deficits requiring two skilled therapists to appropriately progress pt's musculoskeletal strength, neuromuscular control, and endurance while taking into consideration medical acuity and pt safety.    Patient found with: telemetry, pulse ox (continuous), blood pressure cuff, abreu catheter, arterial line, oxygen    General Precautions: Standard, fall   Orthopedic Precautions:N/A   Braces:     Oxygen Device: nasal cannula 3L    Cognition:  Pt is Alert during session.    Therapist provided skilled verbal and tactile cueing to facilitate the following functional mobility tasks. Listed tasks are focused on recovery of impairments and improving pt's independence and efficiency with bed mobility, transfers and ambulation as able.     Bed Mobility:  Supine > Sit: Maximum  Assistance    Transfers:   Sit <> Stand Transfer: Moderate Assistance from eob with no AD   Bed <> Chair: Total Assistance with no AD                  Gait:  Pt unable to take steps during transfer    Balance:  Dynamic Sitting: FAIR+: Maintains balance through MINIMAL excursions of active trunk motion  CGA; relies on L handrail for UE support  Standing:  Static: POOR: Needs MODERATE assist to maintain   Dynamic: 0: N/A    Outcome Measure: AM-PAC 6 CLICK MOBILITY  Total Score:11     Patient/Caregiver Education and Additional Therapeutic Activities/Exercises       Provided pt/caregiver education regarding:   PT POC and goals for therapy   Safety with mobility and fall risk   Safe management of AD as needed   Energy conservation techniques   Instruction on use of call button and importance of calling nursing staff for assistance with mobility     Patient/caregiver able to verbalize understanding; will follow-up with pt/caregiver during current admit for additional questions/concerns within scope of practice.     White board updated.     Patient left up in chair with all lines intact, call button in reach, and nsg present.    Goals:     Multidisciplinary Problems       Physical Therapy Goals          Problem: Physical Therapy    Goal Priority Disciplines Outcome Goal Variances Interventions   Physical Therapy Goal     PT, PT/OT Progressing     Description: Goals to be met by: 2024    Patient will increase functional independence with mobility by performin. Supine to sit with Minimal Assistance   2. Sit to stand transfer with Contact Guard Assistance using RW  3. Gait  x 25 feet with Contact Guard Assistance using Rolling Walker.   4. Ascend/descend 3 stair with bilateral Handrails Contact Guard Assistance using RW.   5. Stand for 3 minutes with Contact Guard Assistance using using RW                          Time Tracking:       PT Received On: 24  PT Start Time: 0950     PT Stop Time: 1014  PT Total  Time (min): 24 min     Billable Minutes: Therapeutic Activity 10 and Neuromuscular Re-education 14    05/20/2024

## 2024-05-20 NOTE — PROGRESS NOTES
[facility]  General Surgery  Progress Note    Subjective:     History of Present Illness:  Jessica Floyd is a 73 y.o. female with a history of HTN, HFpEF, CKD3, GERD, and CAD who presented to the ED on 5/14/24 with melena. She is altered on my interview and so the history was collected from chart review and the family. Appears she was feeling light headed and as though she was going to pass out. She also noted hematochezia. Upon arrival she was AF, tachycardic, and hypotensive. CTA was concerning for GIB and so she was taken to IR where she was found to have extravasation from the ileal branch of the SMA. They performed a coil embolization of a tertiary branch. Following this, she was reportedly doing well from a mentation standpoint, but has continued to require blood transfusion. She had an EGD on 5/15 and C-scope on 5/16 neither of which revealed a source for her bleed although GI was not able to reach the cecum due to tortuosity. Over the last couple of days, she has had worsening abdominal pain and she is not able to participate with my interview due to AMS. Her family feels this is pain related although she has been received narcotics and ativan PRN.      She had a low grade fever to 100.6 earlier today. Currently tachycardic to the 100s but HDS. Her CBC is notable for WBC 40 (stable from prior) and H/H 8.6/25.5. her CMP shows a 2 bili of 2.2 but is otherwise unremarkable. Lactic acid is normal at 1.3. She had a repeat CT A/P today which showed dilated bowel consistent with an ileus. However, per her family and the primary team, her pain seems out of proportion to the findings.     Post-Op Info:  Procedure(s) (LRB):  LAPAROSCOPY, DIAGNOSTIC (N/A)  LAPAROTOMY, EXPLORATORY (N/A)  EXCISION, SMALL INTESTINE (N/A)  REPAIR, HERNIA, INGUINAL (Bilateral)   3 Days Post-Op     Interval History: NAEON. Afebrile. HDS. Pain is controlled. Passing flatus, no BM. Ice chips yesterday, no n/v. Daughter at  bedside.   WBC 19 > 17    Medications:  Continuous Infusions:   dexmedetomidine (PRECEDEX) infusion (non-titrating)  0.5 mcg/kg/hr Intravenous Continuous 5.3 mL/hr at 05/20/24 0700 0.4 mcg/kg/hr at 05/20/24 0700     Scheduled Meds:   LIDOcaine  1 patch Transdermal Q24H    LIDOcaine  1 patch Transdermal Q24H    pantoprazole  40 mg Intravenous Daily    piperacillin-tazobactam (Zosyn) IV (PEDS and ADULTS) (extended infusion is not appropriate)  4.5 g Intravenous Q8H    QUEtiapine  50 mg Oral QHS     PRN Meds:  Current Facility-Administered Medications:     acetaminophen, 650 mg, Oral, Q6H PRN    albuterol-ipratropium, 3 mL, Nebulization, Q6H PRN    ondansetron, 4 mg, Intravenous, Q4H PRN    sodium chloride, 1 spray, Each Nostril, PRN    sodium chloride 0.9%, 10 mL, Intravenous, PRN     Review of patient's allergies indicates:  No Known Allergies  Objective:     Vital Signs (Most Recent):  Temp: 97.9 °F (36.6 °C) (05/20/24 0705)  Pulse: 83 (05/20/24 0712)  Resp: (!) 24 (05/20/24 0712)  BP: 136/73 (05/20/24 0705)  SpO2: 96 % (05/20/24 0712) Vital Signs (24h Range):  Temp:  [97.8 °F (36.6 °C)-98.1 °F (36.7 °C)] 97.9 °F (36.6 °C)  Pulse:  [] 83  Resp:  [14-47] 24  SpO2:  [65 %-99 %] 96 %  BP: (136)/(73) 136/73  Arterial Line BP: (120-155)/(56-79) 130/73     Weight: 52.6 kg (115 lb 15.4 oz)  Body mass index is 20.54 kg/m².    Intake/Output - Last 3 Shifts         05/18 0700 05/19 0659 05/19 0700 05/20 0659 05/20 0700 05/21 0659    P.O.   0    I.V. (mL/kg) 46.7 (0.9) 873 (16.6) 15.7 (0.3)    Blood       IV Piggyback 698.3 898.3     Total Intake(mL/kg) 745 (14.2) 1771.2 (33.7) 15.7 (0.3)    Urine (mL/kg/hr) 1325 (1) 1950 (1.5)     Drains  100     Total Output 1325 2050     Net -580 -278.8 +15.7                    Physical Exam  Vitals and nursing note reviewed.   Constitutional:       General: She is not in acute distress.     Appearance: She is well-developed. She is not diaphoretic.      Interventions: Nasal  cannula in place.      Comments: O2 via NC   HENT:      Head: Normocephalic and atraumatic.      Mouth/Throat:      Mouth: Mucous membranes are moist.      Pharynx: Oropharynx is clear. No oropharyngeal exudate.   Eyes:      General: No scleral icterus.     Extraocular Movements: Extraocular movements intact.   Cardiovascular:      Rate and Rhythm: Normal rate.   Pulmonary:      Effort: Pulmonary effort is normal. No respiratory distress.   Abdominal:      General: There is no distension.      Palpations: Abdomen is soft.      Tenderness: There is no abdominal tenderness. There is no guarding or rebound.      Comments: Incision is clean, dry, and intact without drainage, erythema, or increased warmth. Appropriately TTP.   Genitourinary:     Comments: Maldonado in place  Musculoskeletal:         General: No deformity.   Skin:     General: Skin is warm and dry.      Coloration: Skin is not jaundiced.   Neurological:      General: No focal deficit present.      Mental Status: She is alert and oriented to person, place, and time.      Cranial Nerves: No cranial nerve deficit.          Significant Labs:  I have reviewed all pertinent lab results within the past 24 hours.    Significant Diagnostics:  I have reviewed all pertinent imaging results/findings within the past 24 hours.  Assessment/Plan:     * Acute lower GI bleeding  Jessica Floyd is a 73 y.o. female who presented with a GIB s/p IR embolization of a tertiary branch of the ileal artery now with severe LLQ pain. She is now s/p diagnostic laparotomy converted to ex lap with small bowel resection and primary tissue repair of bilateral inguinal hernias.     - Okay for trial CLD  - Aspiration precautions  - Replete lytes PRN   - K>4, Mag>2, Phos>3  - Serial abdominal exams   - Abx per primary  - Okay for PT/OT. Would recommend early mobilization as tolerated  - Remainder of care per primary team  - Please contact general surgery with any questions, concerns, or clinical  status changes      Dispo: Remainder of care per MICU      Case discussed with Dr. Oscar.    KAVITA Edwards-C  General Surgery  [facility]

## 2024-05-20 NOTE — ASSESSMENT & PLAN NOTE
In the setting of decreased PO intake for multiple days, 1.5L deficit. Received gentle IVF, now advanced to CLD. Will prioritize PO intake.

## 2024-05-20 NOTE — ASSESSMENT & PLAN NOTE
Patient reporting left left pain and weakness associated with pain. Patient has known severe atherosclerosis which may be associated with pain/PAD as is worse with ambulation. Will rule out slipped disk/fracture. Patient has remote history of left hip fracture.    - L spine, pelvis, left hip, left femur, left knee, left tib/fib xrays  - PT/OT  - PT recommending rehab, PM&R consulted

## 2024-05-20 NOTE — ASSESSMENT & PLAN NOTE
Jessica Floyd is a 73 y.o. female who presented with a GIB s/p IR embolization of a tertiary branch of the ileal artery now with severe LLQ pain. She is now s/p diagnostic laparotomy converted to ex lap with small bowel resection and primary tissue repair of bilateral inguinal hernias.     - Okay for trial CLD  - Aspiration precautions  - Replete lytes PRN   - K>4, Mag>2, Phos>3  - Serial abdominal exams   - Abx per primary  - Okay for PT/OT. Would recommend early mobilization as tolerated  - Remainder of care per primary team  - Please contact general surgery with any questions, concerns, or clinical status changes      Dispo: Remainder of care per MICU

## 2024-05-20 NOTE — ASSESSMENT & PLAN NOTE
Noted in history but last echo reveals normal function. Holding home coreg.    Transthoracic echo (TTE) complete 10/29/2023    Interpretation Summary    Left Ventricle: The left ventricle is normal in size. Normal wall thickness. Normal wall motion. There is normal systolic function with a visually estimated ejection fraction of 60 - 65%. There is normal diastolic function.    Right Ventricle: Mild right ventricular enlargement. Wall thickness is normal. Right ventricle wall motion  is normal. Systolic function is normal.    Aortic Valve: The aortic valve is a unicuspid valve. There is mild aortic valve sclerosis. There is trace aortic regurgitation.    Mitral Valve: There is mild regurgitation.    Pulmonary Artery: There is mild pulmonary hypertension. The estimated pulmonary artery systolic pressure is 40 mmHg.    IVC/SVC: Intermediate venous pressure at 8 mmHg.

## 2024-05-20 NOTE — SUBJECTIVE & OBJECTIVE
Interval History/Significant Events: Patient became delirious/agitated overnight. Precedex restarted now at 0.4. Patient is A&Ox4 this morning. On 4LNC, Leukocytosis and anemia with improvement. Patient reporting left leg pain/weakness which is new per patient, but patient with severe lower extremity weakness at baseline. Can only tolerate short distances with a walker per daughter, long distances with wheel chair. PT recommending high intensity therapy.    Review of Systems   Constitutional:  Positive for fatigue.   Eyes:  Negative for visual disturbance.   Respiratory:  Positive for cough. Negative for shortness of breath.    Cardiovascular:  Negative for chest pain and leg swelling.   Gastrointestinal:  Negative for abdominal pain, diarrhea, nausea and vomiting.   Neurological:  Positive for weakness (left leg).   Psychiatric/Behavioral:  Negative for confusion.      Objective:     Vital Signs (Most Recent):  Temp: 97.9 °F (36.6 °C) (05/20/24 0705)  Pulse: 83 (05/20/24 0712)  Resp: (!) 24 (05/20/24 0712)  BP: 136/73 (05/20/24 0705)  SpO2: 96 % (05/20/24 0712) Vital Signs (24h Range):  Temp:  [97.8 °F (36.6 °C)-98.1 °F (36.7 °C)] 97.9 °F (36.6 °C)  Pulse:  [] 83  Resp:  [14-47] 24  SpO2:  [65 %-99 %] 96 %  BP: (136)/(73) 136/73  Arterial Line BP: (120-155)/(56-79) 130/73   Weight: 52.6 kg (115 lb 15.4 oz)  Body mass index is 20.54 kg/m².      Intake/Output Summary (Last 24 hours) at 5/20/2024 0848  Last data filed at 5/20/2024 0830  Gross per 24 hour   Intake 1786.95 ml   Output 2175 ml   Net -388.05 ml          Physical Exam  Vitals and nursing note reviewed.   Constitutional:       General: She is not in acute distress.  HENT:      Head: Normocephalic and atraumatic.      Mouth/Throat:      Mouth: Mucous membranes are moist.   Eyes:      General: No scleral icterus.     Conjunctiva/sclera: Conjunctivae normal.   Cardiovascular:      Rate and Rhythm: Normal rate and regular rhythm.      Heart sounds: Normal  heart sounds. No murmur heard.  Pulmonary:      Effort: Pulmonary effort is normal. No respiratory distress.   Abdominal:      General: Abdomen is flat. There is no distension.      Palpations: Abdomen is soft.      Tenderness: There is abdominal tenderness (Mild). There is no guarding or rebound.      Comments: Less distension, surgical wound dressings clean and intact   Musculoskeletal:         General: No tenderness.      Cervical back: Normal range of motion and neck supple.      Right lower leg: No edema.      Left lower leg: No edema.   Skin:     General: Skin is warm and dry.      Coloration: Skin is not jaundiced or pale.   Neurological:      Mental Status: She is alert and oriented to person, place, and time. Mental status is at baseline.      Comments: Weakness noted to left hip extension, left knee flexion. Unclear the chronicity due to severe atherosclerosis. Walker at baseline for short distances.   Psychiatric:         Mood and Affect: Mood normal.         Behavior: Behavior normal.            Vents:  Oxygen Concentration (%): 70 (05/18/24 1335)  Lines/Drains/Airways       Drain  Duration                  Urethral Catheter 05/15/24 1600 4 days              Arterial Line  Duration             Arterial Line 05/17/24 1919 Left Radial 2 days              Peripheral Intravenous Line  Duration                  Peripheral IV - Single Lumen 05/18/24 0715 20 G;1 3/4 in Anterior;Right Forearm 2 days         Peripheral IV - Single Lumen 05/18/24 0900 20 G;1 3/4 in Anterior;Left Upper Arm 1 day                  Significant Labs:    CBC/Anemia Profile:  Recent Labs   Lab 05/19/24  0850 05/19/24  2130 05/20/24  0749   WBC 22.67* 19.04* 17.49*   HGB 9.0* 8.1* 9.3*   HCT 28.1* 25.7* 29.0*    170 195   MCV 94 95 94   RDW 18.7* 18.4* 18.5*        Chemistries:  Recent Labs   Lab 05/19/24  0417 05/20/24  0333   * 147*   K 3.3* 3.3*   * 112*   CO2 26 26   BUN 15 10   CREATININE 0.7 0.7   CALCIUM 7.7*  7.3*   ALBUMIN 1.6* 1.6*   PROT 4.5* 4.7*   BILITOT 0.9 0.8   ALKPHOS 80 92   ALT 54* 62*   * 149*   MG 1.9 2.1   PHOS 3.4 2.2*       CMP:   Recent Labs   Lab 05/19/24  0417 05/20/24  0333   * 147*   K 3.3* 3.3*   * 112*   CO2 26 26    100   BUN 15 10   CREATININE 0.7 0.7   CALCIUM 7.7* 7.3*   PROT 4.5* 4.7*   ALBUMIN 1.6* 1.6*   BILITOT 0.9 0.8   ALKPHOS 80 92   * 149*   ALT 54* 62*   ANIONGAP 8 9       Significant Imaging:  I have reviewed all pertinent imaging results/findings within the past 24 hours.

## 2024-05-20 NOTE — PROGRESS NOTES
"[facility] - Card[facility] Medical [facility]  Critical Care Medicine  Progress Note    Patient Name: Jessica Floyd  MRN: [id number]  Admission Date: 5/14/2024  Hospital Length of Stay: 6 days  Code Status: Full Code  Attending Provider: Edouard Giles MD  Primary Care Provider: Effie Olmedo MD   Principal Problem: Acute lower GI bleeding    Subjective:     HPI:  Mrs. Floyd is a 73 year old female with PMH notable for COPD on home 2L NC, pulm HTN, R carotid stent on ASA, HFpEF, anemia, and ERIK on infusions who presented to [facility] ED with the melena.  is at bedside and reports that the patient had an episode of diarrhea and lethargy yesterday. She reports that it felt as if "she was going to pass out", so she was using her walker to get around. Patient's  reports that this has never happened before. Patient denies excessive OTC NSAID use. She reports that she drinks 2 wine glasses a day. This morning when the patient woke up she felt very lethargic. Patient went to use the bathroom, and  noted bright red stool in the bowl. Patient reported a pre syncopal episode and felt lightheaded. She admits to weakness, SOB, light-headedness, hematochezia, and pre syncope. Patient denies nausea, vomiting, hematemesis, falls, confusion, chest pain, urinary changes, and fevers. Patient reports taking aspirin daily but no DOAC.     Hemoglobin 5.9 at presentation to ED (baseline ~10). GI and IR consulted given concern for active bleed seen on CTA. In the emergency department she was given 1L IVF and a 80 mg IV protonix bolus. Critical care medicine. consulted for GIB. CTA in the Ed revealed "Acute gastrointestinal bleed at the level of the cecum". IR planning for embolization today. Patient is also receiving 2 pRBC due to acute anemia.     Hospital/ICU Course:  Admitted to ICU for lower GIB s/p embolization with IR 5/14. Colonoscopy performed 5/16 without evidence of active bleed, old blood/clots evacuated. Patient is s/p " 8U pRBCs, 1 FFP, 1 platelet. Course complicated by worsening abdominal pain, general surgery consulted for further evaluation. Patient underwent ex lap 5/17 and found to have necrotic bowel at site of incarcerated hernia. On zosyn for intraabdominal coverage, BC NGTD. Patient oversedated on 5/18 and required BIPAP briefly for CO2 retention. Subsequently placed on precedex for agitation and IV tylenol for pain control, tolerating well.    Interval History/Significant Events: Patient became delirious/agitated overnight. Precedex restarted now at 0.4. Patient is A&Ox4 this morning. On 4LNC, Leukocytosis and anemia with improvement. Patient reporting left leg pain/weakness which is new per patient, but patient with severe lower extremity weakness at baseline. Can only tolerate short distances with a walker per daughter, long distances with wheel chair. PT recommending high intensity therapy.    Review of Systems   Constitutional:  Positive for fatigue.   Eyes:  Negative for visual disturbance.   Respiratory:  Positive for cough. Negative for shortness of breath.    Cardiovascular:  Negative for chest pain and leg swelling.   Gastrointestinal:  Negative for abdominal pain, diarrhea, nausea and vomiting.   Neurological:  Positive for weakness (left leg).   Psychiatric/Behavioral:  Negative for confusion.      Objective:     Vital Signs (Most Recent):  Temp: 97.9 °F (36.6 °C) (05/20/24 0705)  Pulse: 83 (05/20/24 0712)  Resp: (!) 24 (05/20/24 0712)  BP: 136/73 (05/20/24 0705)  SpO2: 96 % (05/20/24 0712) Vital Signs (24h Range):  Temp:  [97.8 °F (36.6 °C)-98.1 °F (36.7 °C)] 97.9 °F (36.6 °C)  Pulse:  [] 83  Resp:  [14-47] 24  SpO2:  [65 %-99 %] 96 %  BP: (136)/(73) 136/73  Arterial Line BP: (120-155)/(56-79) 130/73   Weight: 52.6 kg (115 lb 15.4 oz)  Body mass index is 20.54 kg/m².      Intake/Output Summary (Last 24 hours) at 5/20/2024 0848  Last data filed at 5/20/2024 0830  Gross per 24 hour   Intake 1786.95 ml    Output 2175 ml   Net -388.05 ml          Physical Exam  Vitals and nursing note reviewed.   Constitutional:       General: She is not in acute distress.  HENT:      Head: Normocephalic and atraumatic.      Mouth/Throat:      Mouth: Mucous membranes are moist.   Eyes:      General: No scleral icterus.     Conjunctiva/sclera: Conjunctivae normal.   Cardiovascular:      Rate and Rhythm: Normal rate and regular rhythm.      Heart sounds: Normal heart sounds. No murmur heard.  Pulmonary:      Effort: Pulmonary effort is normal. No respiratory distress.   Abdominal:      General: Abdomen is flat. There is no distension.      Palpations: Abdomen is soft.      Tenderness: There is abdominal tenderness (Mild). There is no guarding or rebound.      Comments: Less distension, surgical wound dressings clean and intact   Musculoskeletal:         General: No tenderness.      Cervical back: Normal range of motion and neck supple.      Right lower leg: No edema.      Left lower leg: No edema.   Skin:     General: Skin is warm and dry.      Coloration: Skin is not jaundiced or pale.   Neurological:      Mental Status: She is alert and oriented to person, place, and time. Mental status is at baseline.      Comments: Weakness noted to left hip extension, left knee flexion. Unclear the chronicity due to severe atherosclerosis. Walker at baseline for short distances.   Psychiatric:         Mood and Affect: Mood normal.         Behavior: Behavior normal.            Vents:  Oxygen Concentration (%): 70 (05/18/24 1335)  Lines/Drains/Airways       Drain  Duration                  Urethral Catheter 05/15/24 1600 4 days              Arterial Line  Duration             Arterial Line 05/17/24 1919 Left Radial 2 days              Peripheral Intravenous Line  Duration                  Peripheral IV - Single Lumen 05/18/24 0715 20 G;1 3/4 in Anterior;Right Forearm 2 days         Peripheral IV - Single Lumen 05/18/24 0900 20 G;1 3/4 in  Anterior;Left Upper Arm 1 day                  Significant Labs:    CBC/Anemia Profile:  Recent Labs   Lab 05/19/24  0850 05/19/24  2130 05/20/24  0749   WBC 22.67* 19.04* 17.49*   HGB 9.0* 8.1* 9.3*   HCT 28.1* 25.7* 29.0*    170 195   MCV 94 95 94   RDW 18.7* 18.4* 18.5*        Chemistries:  Recent Labs   Lab 05/19/24  0417 05/20/24  0333   * 147*   K 3.3* 3.3*   * 112*   CO2 26 26   BUN 15 10   CREATININE 0.7 0.7   CALCIUM 7.7* 7.3*   ALBUMIN 1.6* 1.6*   PROT 4.5* 4.7*   BILITOT 0.9 0.8   ALKPHOS 80 92   ALT 54* 62*   * 149*   MG 1.9 2.1   PHOS 3.4 2.2*       CMP:   Recent Labs   Lab 05/19/24  0417 05/20/24  0333   * 147*   K 3.3* 3.3*   * 112*   CO2 26 26    100   BUN 15 10   CREATININE 0.7 0.7   CALCIUM 7.7* 7.3*   PROT 4.5* 4.7*   ALBUMIN 1.6* 1.6*   BILITOT 0.9 0.8   ALKPHOS 80 92   * 149*   ALT 54* 62*   ANIONGAP 8 9       Significant Imaging:  I have reviewed all pertinent imaging results/findings within the past 24 hours.    ABG  Recent Labs   Lab 05/19/24  0501   PH 7.368   PO2 96   PCO2 51.5*   HCO3 29.6*   BE 4*     Assessment/Plan:     Pulmonary  Pulmonary emphysema  Not on oxygen at home.     -Home inhalers held in the setting of worsening ileus, resume when appropriate   -Duonebs scheduled    Cardiac/Vascular  (HFpEF) heart failure with preserved ejection fraction  Noted in history but last echo reveals normal function. Holding home coreg.    Transthoracic echo (TTE) complete 10/29/2023    Interpretation Summary    Left Ventricle: The left ventricle is normal in size. Normal wall thickness. Normal wall motion. There is normal systolic function with a visually estimated ejection fraction of 60 - 65%. There is normal diastolic function.    Right Ventricle: Mild right ventricular enlargement. Wall thickness is normal. Right ventricle wall motion  is normal. Systolic function is normal.    Aortic Valve: The aortic valve is a unicuspid valve. There is  [Initial Evaluation] : an initial evaluation for [FreeTextEntry2] : frequent streptococcus infections for the past year.   Patient states his level of severity is a level 7 out of 10 and it occurs constant.  Patient states nothing helps to improve or worsens his  mild aortic valve sclerosis. There is trace aortic regurgitation.    Mitral Valve: There is mild regurgitation.    Pulmonary Artery: There is mild pulmonary hypertension. The estimated pulmonary artery systolic pressure is 40 mmHg.    IVC/SVC: Intermediate venous pressure at 8 mmHg.        Hypertension  Hypertension Medications                    amLODIPine (NORVASC) 5 MG tablet TAKE 1 TABLET BY MOUTH EVERY DAY     carvediloL (COREG) 6.25 MG tablet Take 1 tablet (6.25 mg total) by mouth 2 (two) times daily with meals.     hydroCHLOROthiazide (HYDRODIURIL) 25 MG tablet Take 1 tablet (25 mg total) by mouth once daily.       -Will hold antihypertensives in setting of acute bleed and hypotension    Bilateral carotid artery stenosis  April 2022     No evidence of hemodynamically significant stenosis is demonstrated in the extracranial carotid arteries.  Patent right carotid stent    Renal/  Hypernatremia  In the setting of decreased PO intake for multiple days, 1.5L deficit. Received gentle IVF, now advanced to CLD. Will prioritize PO intake.    Oncology  Acute blood loss anemia  See acute lower GIB    Leukocytosis  Leukocytosis on admission, initially thought to be acute phase reactant to GIB as patient afebrile.   - Started on IV Zosyn for concern for intraabdominal infection given worsening abdominal pain, distension   - Bcx2 NGTD    GI  * Acute lower GI bleeding  73 year old female with multiple medical problems presenting with melena and some bright red blood with associated pre-syncope found to have hgb of 5.9 (from baseline ~10). CTA with acute gastrointestinal bleed at the level of the cecum. Patient had embolization by IR but continued to to have larger volume dark red blood in stool with falling hgb, repeat CTA without active bleed.     S/p 9U pRBCs, 1 FFP, 1 platelet      -- Colonoscopy performed 5/16 with no evidence of active bleed, old blood and clots evacuated  -- IR consulted, embolization performed  5/14  -- Advance diet to clears  -- Protonix 40 mg qd  -- Hold all A/C and A/P agents   -- Correct any coagulopathy with platelets and FFP for goal of platelets > 50K and INR <2.0   -- Maintain large bore IV access   -- MAP > 65 goal   -- Maintain type and screen   -- Trend CBCs    Incarcerated inguinal hernia  See bowel ischemia    Ischemia, bowel  Pain out of proportion to exam  S/p ex lap 5/17 with incarcerated hernia repair with bowel resection per general surgery  General surgery consulted, following  H/H stable    Ileus  Noted on CT A/P  NGT placed to suction 5/17    Orthopedic  Left leg pain  Patient reporting left left pain and weakness associated with pain. Patient has known severe atherosclerosis which may be associated with pain/PAD as is worse with ambulation. Will rule out slipped disk/fracture. Patient has remote history of left hip fracture.    - L spine, pelvis, left hip, left femur, left knee, left tib/fib xrays  - PT/OT  - PT recommending rehab, PM&R consulted    Other  History Situational (ie Stress Induced) syncope ([facility] admission 12-25-23  History of syncope. Thought to be reflex mediated. Had pre-syncopal episode at home after having diarrhea but before she noticed active bleeding.     Acute hypoxic on chronic hypercapnic respiratory failure  Patient with Hypercapnic and Hypoxic Respiratory failure which is Acute.  she is on home oxygen at 2 LPM. Signs/symptoms of respiratory failure include- respiratory distress. Contributing diagnoses includes - COPD and oversedation.  Labs and images were reviewed. Patient Has recent ABG, which has been reviewed. Will treat underlying causes and adjust management of respiratory failure as follows: Patient required intermittent short term BIPAP, now satting well on 4L NC.    - Repeat ABG  - Scheduled Duonebs  - Chest physiotherapy  - Acapella  - IS       Critical Care Daily Checklist:    A: Awake: RASS Goal/Actual Goal: RASS Goal: 0-->alert and  calm  Actual:     B: Spontaneous Breathing Trial Performed?     C: SAT & SBT Coordinated?  N/A                      D: Delirium: CAM-ICU Overall CAM-ICU: Negative   E: Early Mobility Performed? Yes   F: Feeding Goal:    Status:     Current Diet Order   Procedures    Diet Clear Liquid      AS: Analgesia/Sedation precedex   T: Thromboembolic Prophylaxis N/A   H: HOB > 300 Yes   U: Stress Ulcer Prophylaxis (if needed) pantoprazole   G: Glucose Control N/A   B: Bowel Function Stool Occurrence: 1   I: Indwelling Catheter (Lines & Abreu) Necessity Art line, PIV x2, abreu    D: De-escalation of Antimicrobials/Pharmacotherapies N/A    Plan for the day/ETD Pain control, PT/OT, advance diet    Code Status:  Family/Goals of Care: Full Code         Critical secondary to Patient has a condition that poses threat to life and bodily function.     Critical care was time spent personally by me on the following activities: development of treatment plan with patient or surrogate and bedside caregivers, discussions with consultants, evaluation of patient's response to treatment, examination of patient, ordering and performing treatments and interventions, ordering and review of laboratory studies, ordering and review of radiographic studies, pulse oximetry, re-evaluation of patient's condition. This critical care time did not overlap with that of any other provider or involve time for any procedures.     Alesia Archer MD  Critical Care Medicine  [facility] - Cardiac Medical ICU

## 2024-05-20 NOTE — PT/OT/SLP PROGRESS
Occupational Therapy  Co Treatment    Name: Jessica Floyd  MRN: [id number]  Admitting Diagnosis:  Acute lower GI bleeding  3 Days Post-Op    Recommendations:     Discharge Recommendations: High Intensity Therapy  Discharge Equipment Recommendations:  shower chair      Assessment:     Jessica Floyd is a 73 y.o. female with a medical diagnosis of Acute lower GI bleeding.Performance deficits affecting function are weakness, impaired endurance, impaired self care skills, impaired functional mobility, gait instability, impaired balance. Pt tolerated session fairly well. Increased anxiety noted with all activity.       Rehab Prognosis:  Good; patient would benefit from acute skilled OT services to address these deficits and reach maximum level of function.       Plan:     Patient to be seen 4 x/week to address the above listed problems via self-care/home management, therapeutic activities, therapeutic exercises  Plan of Care Expires: 06/19/24  Plan of Care Reviewed with: patient    Subjective     Pt agreeable to therapy     Pain/Comfort:  Pain Rating 1: 0/10    Objective:     Communicated with: nsg prior to session.  Patient found in bed with tele, pulse ox, BP cuff, A-line, abreu, 3 LPM.   Cotx completed this date to optimize functional performance and safety given impaired tolerance for activity     General Precautions: Standard, fall      Occupational Performance:     Bed Mobility:    Supine>sit with MAX A     Functional Mobility/Transfers:  Sit>stand with MOD  A  Stand pivot with TOTAL A   Increased anxiety with transfer requiring cues for slow breathing.     Activities of Daily Living:  G/H; set-up seated only. Unable  to tolerance tasks in standing due to impaired standing balance.   UE dressing: TOTAL A   Toileting: TOTAL A     AMPAC 6 Click ADL: 14    Treatment & Education:  Pt awake, alert and following commands.   There exs completed via postural control tasks seated EOB to assess cardiopulmonary response to  functional activity. Pt demo CGA for postural control seated EOB. Pt reaching for external support at times needing cues for safety hand placement. VSS remained stable throughout.   Pt also with 2 separate episodes of sudden onset increased anxiety. Increased cues needed for encouragement and focus on breathing technique. Increased time required for all activity/transitions due to anxiety.   Pt also with IV dislodged during session. Pressure placed and nsg arrived to room to address.     Education provided re: role of OT and safety with functional mobility/ADl skills.       Patient left up in chair with all lines intact, call button in reach, nsg notified, and spouse present    GOALS:   Multidisciplinary Problems       Occupational Therapy Goals          Problem: Occupational Therapy    Goal Priority Disciplines Outcome Interventions   Occupational Therapy Goal     OT, PT/OT Progressing    Description: Goals to be met by: 6/2/24     Patient will increase functional independence with ADLs by performing:    UE Dressing with Stand-by Assistance.  LE Dressing with Minimal Assistance.  Grooming while standing at sink with Minimal Assistance.  Toileting from toilet with Minimal Assistance for hygiene and clothing management.   Toilet transfer to toilet with Minimal Assistance.                         Time Tracking:     OT Date of Treatment: 05/20/24  OT Start Time: 0950  OT Stop Time: 1014  OT Total Time (min): 24 min    Billable Minutes:Therapeutic Activity 14  Therapeutic Exercise 10    OT/OCTAVIO: OT          5/20/2024

## 2024-05-20 NOTE — ASSESSMENT & PLAN NOTE
Not on oxygen at home.     -Home inhalers held in the setting of worsening ileus, resume when appropriate   -Mihsa mckenna

## 2024-05-20 NOTE — PLAN OF CARE
MICU DAILY GOALS     Family/Goals of care/Code Status   Code Status: Full Code    24H Vital Sign Range  Temp:  [97.8 °F (36.6 °C)-99.7 °F (37.6 °C)]   Pulse:  []   Resp:  [14-35]   BP: ()/(59-78)   SpO2:  [65 %-99 %]   Arterial Line BP: (105-177)/()      Shift Events (include procedures and significant events)   No acute events throughout shift. Administered medications per orders, pt tolerated well. Strict I and O maintained. Precedex gtt titrated off, oxygen weaned as tolerated.    AWAKE RASS: Goal - RASS Goal: 0-->alert and calm  Actual - RASS ([place] Agitation-Sedation Scale): alert and calm    Restraint necessity: Not necessary   BREATHE SBT: Not intubated    Coordinate A & B, analgesics/sedatives Pain: managed   SAT: Not intubated   Delirium CAM-ICU: Overall CAM-ICU: Negative   Early(intubated/ Progressive (non-intubated) Mobility MOVE Screen (INTUBATED ONLY): Not intubated    Activity: Activity Management: Rolling - L1   Feeding/Nutrition Diet order: Diet/Nutrition Received: clear liquid,     Thrombus DVT prophylaxis: VTE Required Core Measure: Pharmacological prophylaxis initiated/maintained   HOB Elevation Head of Bed (HOB) Positioning: HOB elevated   Ulcer Prophylaxis GI: yes   Glucose control managed     Skin Skin assessed during: Daily Assessment    Sacrum intact/not altered? Yes  Heels intact/not altered? Yes  Surgical wound? Yes    CHECK ONE!   (no altered skin or altered skin) and sub boxes:  [] No Altered Skin Integrity Present    []Prevention Measures Documented    [x] Altered Skin Integrity Present or Discovered   [x] LDA present in EPIC, daily doc completed              [x] LDA added if not in EPIC (describe wound).                    When describing wound, do not stage, use descriptive words only.    [x] Wound Image Taken (required on admit,                   transfer/discharge and every Tuesday)    Wound Care Consulted? Yes    4 EYES:  Attending Nurse (1st set of eyes):  Jocelyn RN    Second RN/Staff Member (2nd set of eyes): PT   Bowel Function constipation    Indwelling Catheter Necessity      Urethral Catheter 05/15/24 1600-Reason for Continuing Urinary Catheterization: Critically ill in ICU and requiring hourly monitoring of intake/output          De-escalation Antibiotics No        VS and assessment per flow sheet, patient progressing towards goals as tolerated, plan of care reviewed with family, all concerns addressed, will continue to monitor.

## 2024-05-20 NOTE — SUBJECTIVE & OBJECTIVE
Interval History: NAEON. Afebrile. HDS. Pain is controlled. Passing flatus, no BM. Ice chips yesterday, no n/v. Daughter at bedside.   WBC 19 > 17    Medications:  Continuous Infusions:   dexmedetomidine (PRECEDEX) infusion (non-titrating)  0.5 mcg/kg/hr Intravenous Continuous 5.3 mL/hr at 05/20/24 0700 0.4 mcg/kg/hr at 05/20/24 0700     Scheduled Meds:   LIDOcaine  1 patch Transdermal Q24H    LIDOcaine  1 patch Transdermal Q24H    pantoprazole  40 mg Intravenous Daily    piperacillin-tazobactam (Zosyn) IV (PEDS and ADULTS) (extended infusion is not appropriate)  4.5 g Intravenous Q8H    QUEtiapine  50 mg Oral QHS     PRN Meds:  Current Facility-Administered Medications:     acetaminophen, 650 mg, Oral, Q6H PRN    albuterol-ipratropium, 3 mL, Nebulization, Q6H PRN    ondansetron, 4 mg, Intravenous, Q4H PRN    sodium chloride, 1 spray, Each Nostril, PRN    sodium chloride 0.9%, 10 mL, Intravenous, PRN     Review of patient's allergies indicates:  No Known Allergies  Objective:     Vital Signs (Most Recent):  Temp: 97.9 °F (36.6 °C) (05/20/24 0705)  Pulse: 83 (05/20/24 0712)  Resp: (!) 24 (05/20/24 0712)  BP: 136/73 (05/20/24 0705)  SpO2: 96 % (05/20/24 0712) Vital Signs (24h Range):  Temp:  [97.8 °F (36.6 °C)-98.1 °F (36.7 °C)] 97.9 °F (36.6 °C)  Pulse:  [] 83  Resp:  [14-47] 24  SpO2:  [65 %-99 %] 96 %  BP: (136)/(73) 136/73  Arterial Line BP: (120-155)/(56-79) 130/73     Weight: 52.6 kg (115 lb 15.4 oz)  Body mass index is 20.54 kg/m².    Intake/Output - Last 3 Shifts         05/18 0700 05/19 0659 05/19 0700 05/20 0659 05/20 0700  05/21 0659    P.O.   0    I.V. (mL/kg) 46.7 (0.9) 873 (16.6) 15.7 (0.3)    Blood       IV Piggyback 698.3 898.3     Total Intake(mL/kg) 745 (14.2) 1771.2 (33.7) 15.7 (0.3)    Urine (mL/kg/hr) 1325 (1) 1950 (1.5)     Drains  100     Total Output 1325 2050     Net -580 -278.8 +15.7                    Physical Exam  Vitals and nursing note reviewed.   Constitutional:       General:  She is not in acute distress.     Appearance: She is well-developed. She is not diaphoretic.      Interventions: Nasal cannula in place.      Comments: O2 via NC   HENT:      Head: Normocephalic and atraumatic.      Mouth/Throat:      Mouth: Mucous membranes are moist.      Pharynx: Oropharynx is clear. No oropharyngeal exudate.   Eyes:      General: No scleral icterus.     Extraocular Movements: Extraocular movements intact.   Cardiovascular:      Rate and Rhythm: Normal rate.   Pulmonary:      Effort: Pulmonary effort is normal. No respiratory distress.   Abdominal:      General: There is no distension.      Palpations: Abdomen is soft.      Tenderness: There is no abdominal tenderness. There is no guarding or rebound.      Comments: Incision is clean, dry, and intact without drainage, erythema, or increased warmth. Appropriately TTP.   Genitourinary:     Comments: Maldonado in place  Musculoskeletal:         General: No deformity.   Skin:     General: Skin is warm and dry.      Coloration: Skin is not jaundiced.   Neurological:      General: No focal deficit present.      Mental Status: She is alert and oriented to person, place, and time.      Cranial Nerves: No cranial nerve deficit.          Significant Labs:  I have reviewed all pertinent lab results within the past 24 hours.    Significant Diagnostics:  I have reviewed all pertinent imaging results/findings within the past 24 hours.

## 2024-05-20 NOTE — PLAN OF CARE
CM sent rehab referral to [facility] via [facility].    PMR placed.    Dorothy Hung RN     [phone]

## 2024-05-20 NOTE — HPI
Jessica Floyd is a 73-year-old female with PMHx of L eye blindness, R eye visual impairment, fall 5/27/23 and was found to have a left femoral neck fracture and s/p left total hip arthroplasty for femoral neck fracture on 5/28/23, COPD on home 2L NC (per  has been on room air), pulm HTN, R carotid stent on ASA, HF, anemia, and ERIK on infusions. Patient presented to [facility] [place] on 5/14/24 for acute lower GI bleed. S/p embolization with IR 5/14. Colonoscopy performed 5/16 without evidence of active bleed, old blood/clots evacuated. Hospital course complicated by worsening abdominal pain, general surgery consulted for further evaluation. Patient underwent diagnostic laparotomy converted to ex lap with small bowel resection and primary tissue repair of bilateral inguinal hernias 5/17/24.     Functional History: Patient lives with  in a single story home with 3 steps to enter. Prior to admission, [place]. DME: [employer].

## 2024-05-20 NOTE — ASSESSMENT & PLAN NOTE
73 year old female with multiple medical problems presenting with melena and some bright red blood with associated pre-syncope found to have hgb of 5.9 (from baseline ~10). CTA with acute gastrointestinal bleed at the level of the cecum. Patient had embolization by IR but continued to to have larger volume dark red blood in stool with falling hgb, repeat CTA without active bleed.     S/p 9U pRBCs, 1 FFP, 1 platelet      -- Colonoscopy performed 5/16 with no evidence of active bleed, old blood and clots evacuated  -- IR consulted, embolization performed 5/14  -- Advance diet to clears  -- Protonix 40 mg qd  -- Hold all A/C and A/P agents   -- Correct any coagulopathy with platelets and FFP for goal of platelets > 50K and INR <2.0   -- Maintain large bore IV access   -- MAP > 65 goal   -- Maintain type and screen   -- Trend CBCs

## 2024-05-21 ENCOUNTER — TELEPHONE (OUTPATIENT)
Dept: PRIMARY CARE CLINIC | Facility: CLINIC | Age: 74
End: 2024-05-21
Payer: MEDICARE

## 2024-05-21 LAB
ALBUMIN SERPL BCP-MCNC: 1.6 G/DL (ref 3.5–5.2)
ALP SERPL-CCNC: 77 U/L (ref 55–135)
ALT SERPL W/O P-5'-P-CCNC: 61 U/L (ref 10–44)
ANION GAP SERPL CALC-SCNC: 11 MMOL/L (ref 8–16)
ANISOCYTOSIS BLD QL SMEAR: SLIGHT
AST SERPL-CCNC: 114 U/L (ref 10–40)
BACTERIA BLD CULT: NORMAL
BASOPHILS # BLD AUTO: 0.03 K/UL (ref 0–0.2)
BASOPHILS # BLD AUTO: 0.03 K/UL (ref 0–0.2)
BASOPHILS NFR BLD: 0.2 % (ref 0–1.9)
BASOPHILS NFR BLD: 0.2 % (ref 0–1.9)
BILIRUB SERPL-MCNC: 0.8 MG/DL (ref 0.1–1)
BUN SERPL-MCNC: 11 MG/DL (ref 8–23)
CALCIUM SERPL-MCNC: 7.3 MG/DL (ref 8.7–10.5)
CHLORIDE SERPL-SCNC: 105 MMOL/L (ref 95–110)
CO2 SERPL-SCNC: 25 MMOL/L (ref 23–29)
CREAT SERPL-MCNC: 0.8 MG/DL (ref 0.5–1.4)
DIFFERENTIAL METHOD BLD: ABNORMAL
DIFFERENTIAL METHOD BLD: ABNORMAL
EOSINOPHIL # BLD AUTO: 0.4 K/UL (ref 0–0.5)
EOSINOPHIL # BLD AUTO: 0.5 K/UL (ref 0–0.5)
EOSINOPHIL NFR BLD: 2.6 % (ref 0–8)
EOSINOPHIL NFR BLD: 3.4 % (ref 0–8)
ERYTHROCYTE [DISTWIDTH] IN BLOOD BY AUTOMATED COUNT: 17.4 % (ref 11.5–14.5)
ERYTHROCYTE [DISTWIDTH] IN BLOOD BY AUTOMATED COUNT: 17.6 % (ref 11.5–14.5)
EST. GFR  (NO RACE VARIABLE): >60 ML/MIN/1.73 M^2
GLUCOSE SERPL-MCNC: 190 MG/DL (ref 70–110)
HCT VFR BLD AUTO: 29.7 % (ref 37–48.5)
HCT VFR BLD AUTO: 30.1 % (ref 37–48.5)
HGB BLD-MCNC: 9.5 G/DL (ref 12–16)
HGB BLD-MCNC: 9.6 G/DL (ref 12–16)
IMM GRANULOCYTES # BLD AUTO: 0.45 K/UL (ref 0–0.04)
IMM GRANULOCYTES # BLD AUTO: 0.61 K/UL (ref 0–0.04)
IMM GRANULOCYTES NFR BLD AUTO: 3.3 % (ref 0–0.5)
IMM GRANULOCYTES NFR BLD AUTO: 3.9 % (ref 0–0.5)
INR PPP: 1 (ref 0.8–1.2)
LYMPHOCYTES # BLD AUTO: 1 K/UL (ref 1–4.8)
LYMPHOCYTES # BLD AUTO: 1.7 K/UL (ref 1–4.8)
LYMPHOCYTES NFR BLD: 12.6 % (ref 18–48)
LYMPHOCYTES NFR BLD: 6.5 % (ref 18–48)
MAGNESIUM SERPL-MCNC: 1.7 MG/DL (ref 1.6–2.6)
MCH RBC QN AUTO: 30.2 PG (ref 27–31)
MCH RBC QN AUTO: 30.4 PG (ref 27–31)
MCHC RBC AUTO-ENTMCNC: 31.9 G/DL (ref 32–36)
MCHC RBC AUTO-ENTMCNC: 32 G/DL (ref 32–36)
MCV RBC AUTO: 94 FL (ref 82–98)
MCV RBC AUTO: 95 FL (ref 82–98)
MONOCYTES # BLD AUTO: 1.6 K/UL (ref 0.3–1)
MONOCYTES # BLD AUTO: 1.9 K/UL (ref 0.3–1)
MONOCYTES NFR BLD: 10.4 % (ref 4–15)
MONOCYTES NFR BLD: 14.2 % (ref 4–15)
NEUTROPHILS # BLD AUTO: 11.9 K/UL (ref 1.8–7.7)
NEUTROPHILS # BLD AUTO: 9 K/UL (ref 1.8–7.7)
NEUTROPHILS NFR BLD: 66.3 % (ref 38–73)
NEUTROPHILS NFR BLD: 76.4 % (ref 38–73)
NRBC BLD-RTO: 0 /100 WBC
NRBC BLD-RTO: 0 /100 WBC
PHOSPHATE SERPL-MCNC: 2.7 MG/DL (ref 2.7–4.5)
PLATELET # BLD AUTO: 207 K/UL (ref 150–450)
PLATELET # BLD AUTO: 233 K/UL (ref 150–450)
PLATELET BLD QL SMEAR: ABNORMAL
PLATELET BLD QL SMEAR: ABNORMAL
PMV BLD AUTO: 10.2 FL (ref 9.2–12.9)
PMV BLD AUTO: 9.7 FL (ref 9.2–12.9)
POLYCHROMASIA BLD QL SMEAR: ABNORMAL
POTASSIUM SERPL-SCNC: 3.5 MMOL/L (ref 3.5–5.1)
PROT SERPL-MCNC: 4.9 G/DL (ref 6–8.4)
PROTHROMBIN TIME: 11.2 SEC (ref 9–12.5)
RBC # BLD AUTO: 3.15 M/UL (ref 4–5.4)
RBC # BLD AUTO: 3.16 M/UL (ref 4–5.4)
SODIUM SERPL-SCNC: 141 MMOL/L (ref 136–145)
WBC # BLD AUTO: 13.62 K/UL (ref 3.9–12.7)
WBC # BLD AUTO: 15.52 K/UL (ref 3.9–12.7)

## 2024-05-21 PROCEDURE — 94660 CPAP INITIATION&MGMT: CPT | Mod: NTX

## 2024-05-21 PROCEDURE — 63600175 PHARM REV CODE 636 W HCPCS: Mod: NTX

## 2024-05-21 PROCEDURE — 94664 DEMO&/EVAL PT USE INHALER: CPT | Mod: NTX

## 2024-05-21 PROCEDURE — 51798 US URINE CAPACITY MEASURE: CPT | Mod: NTX

## 2024-05-21 PROCEDURE — 99291 CRITICAL CARE FIRST HOUR: CPT | Mod: GC,NTX,, | Performed by: INTERNAL MEDICINE

## 2024-05-21 PROCEDURE — 94761 N-INVAS EAR/PLS OXIMETRY MLT: CPT | Mod: NTX

## 2024-05-21 PROCEDURE — 25000003 PHARM REV CODE 250: Mod: NTX | Performed by: INTERNAL MEDICINE

## 2024-05-21 PROCEDURE — 94640 AIRWAY INHALATION TREATMENT: CPT | Mod: NTX

## 2024-05-21 PROCEDURE — 99900035 HC TECH TIME PER 15 MIN (STAT): Mod: NTX

## 2024-05-21 PROCEDURE — 51701 INSERT BLADDER CATHETER: CPT | Mod: NTX

## 2024-05-21 PROCEDURE — 20000000 HC ICU ROOM: Mod: NTX

## 2024-05-21 PROCEDURE — 85025 COMPLETE CBC W/AUTO DIFF WBC: CPT | Mod: 91,NTX

## 2024-05-21 PROCEDURE — 25000003 PHARM REV CODE 250: Mod: NTX

## 2024-05-21 PROCEDURE — 85610 PROTHROMBIN TIME: CPT | Mod: NTX

## 2024-05-21 PROCEDURE — 63600175 PHARM REV CODE 636 W HCPCS: Mod: NTX | Performed by: INTERNAL MEDICINE

## 2024-05-21 PROCEDURE — 94668 MNPJ CHEST WALL SBSQ: CPT | Mod: NTX

## 2024-05-21 PROCEDURE — 27000221 HC OXYGEN, UP TO 24 HOURS: Mod: NTX

## 2024-05-21 PROCEDURE — 83735 ASSAY OF MAGNESIUM: CPT | Mod: NTX

## 2024-05-21 PROCEDURE — 80053 COMPREHEN METABOLIC PANEL: CPT | Mod: NTX

## 2024-05-21 PROCEDURE — C9113 INJ PANTOPRAZOLE SODIUM, VIA: HCPCS | Mod: NTX | Performed by: INTERNAL MEDICINE

## 2024-05-21 PROCEDURE — 84100 ASSAY OF PHOSPHORUS: CPT | Mod: NTX

## 2024-05-21 PROCEDURE — 25000242 PHARM REV CODE 250 ALT 637 W/ HCPCS: Mod: NTX

## 2024-05-21 RX ORDER — SODIUM CHLORIDE 9 MG/ML
INJECTION, SOLUTION INTRAVENOUS
Status: DISCONTINUED | OUTPATIENT
Start: 2024-05-21 | End: 2024-06-03 | Stop reason: HOSPADM

## 2024-05-21 RX ORDER — POTASSIUM CHLORIDE 20 MEQ/1
40 TABLET, EXTENDED RELEASE ORAL ONCE
Status: DISCONTINUED | OUTPATIENT
Start: 2024-05-21 | End: 2024-05-21

## 2024-05-21 RX ORDER — MAGNESIUM SULFATE HEPTAHYDRATE 40 MG/ML
2 INJECTION, SOLUTION INTRAVENOUS ONCE
Status: COMPLETED | OUTPATIENT
Start: 2024-05-21 | End: 2024-05-21

## 2024-05-21 RX ORDER — PANTOPRAZOLE SODIUM 40 MG/1
40 TABLET, DELAYED RELEASE ORAL DAILY
Status: DISCONTINUED | OUTPATIENT
Start: 2024-05-22 | End: 2024-05-23

## 2024-05-21 RX ORDER — QUETIAPINE FUMARATE 25 MG/1
50 TABLET, FILM COATED ORAL NIGHTLY PRN
Status: DISCONTINUED | OUTPATIENT
Start: 2024-05-21 | End: 2024-05-22

## 2024-05-21 RX ADMIN — IPRATROPIUM BROMIDE AND ALBUTEROL SULFATE 3 ML: 2.5; .5 SOLUTION RESPIRATORY (INHALATION) at 01:05

## 2024-05-21 RX ADMIN — PANTOPRAZOLE SODIUM 40 MG: 40 INJECTION, POWDER, FOR SOLUTION INTRAVENOUS at 08:05

## 2024-05-21 RX ADMIN — QUETIAPINE FUMARATE 50 MG: 25 TABLET ORAL at 08:05

## 2024-05-21 RX ADMIN — POTASSIUM BICARBONATE 40 MEQ: 391 TABLET, EFFERVESCENT ORAL at 09:05

## 2024-05-21 RX ADMIN — IPRATROPIUM BROMIDE AND ALBUTEROL SULFATE 3 ML: 2.5; .5 SOLUTION RESPIRATORY (INHALATION) at 07:05

## 2024-05-21 RX ADMIN — SODIUM CHLORIDE: 9 INJECTION, SOLUTION INTRAVENOUS at 09:05

## 2024-05-21 RX ADMIN — PIPERACILLIN SODIUM AND TAZOBACTAM SODIUM 4.5 G: 4; .5 INJECTION, POWDER, FOR SOLUTION INTRAVENOUS at 04:05

## 2024-05-21 RX ADMIN — LIDOCAINE 1 PATCH: 700 PATCH TOPICAL at 10:05

## 2024-05-21 RX ADMIN — PIPERACILLIN SODIUM AND TAZOBACTAM SODIUM 4.5 G: 4; .5 INJECTION, POWDER, FOR SOLUTION INTRAVENOUS at 08:05

## 2024-05-21 RX ADMIN — MAGNESIUM SULFATE HEPTAHYDRATE 2 G: 40 INJECTION, SOLUTION INTRAVENOUS at 09:05

## 2024-05-21 RX ADMIN — ACETAMINOPHEN 650 MG: 325 TABLET ORAL at 11:05

## 2024-05-21 RX ADMIN — IPRATROPIUM BROMIDE AND ALBUTEROL SULFATE 3 ML: 2.5; .5 SOLUTION RESPIRATORY (INHALATION) at 08:05

## 2024-05-21 RX ADMIN — ENOXAPARIN SODIUM 40 MG: 40 INJECTION SUBCUTANEOUS at 04:05

## 2024-05-21 RX ADMIN — ACETAMINOPHEN 650 MG: 325 TABLET ORAL at 08:05

## 2024-05-21 NOTE — ASSESSMENT & PLAN NOTE
73 year old female with multiple medical problems presenting with melena and some bright red blood with associated pre-syncope found to have hgb of 5.9 (from baseline ~10). CTA with acute gastrointestinal bleed at the level of the cecum. Patient had embolization by IR but continued to to have larger volume dark red blood in stool with falling hgb, repeat CTA without active bleed.     S/p 9U pRBCs, 1 FFP, 1 platelet      Colonoscopy performed 5/16 with no evidence of active bleed, old blood and clots evacuated  IR consulted, embolization performed 5/14    -- Advance diet to regular  -- Protonix 40 mg qd  -- Hold all A/C and A/P agents   -- Correct any coagulopathy with platelets and FFP for goal of platelets > 50K and INR <2.0   -- Maintain large bore IV access   -- MAP > 65 goal   -- Maintain type and screen   -- Trend CBCs

## 2024-05-21 NOTE — ASSESSMENT & PLAN NOTE
Patient with Hypercapnic and Hypoxic Respiratory failure which is Acute.  she is on home oxygen at 2 LPM. Signs/symptoms of respiratory failure include- respiratory distress. Contributing diagnoses includes - COPD and oversedation.  Labs and images were reviewed. Patient Has recent ABG, which has been reviewed. Will treat underlying causes and adjust management of respiratory failure as follows: Patient required intermittent short term BIPAP, now satting well on 4L NC.    - Duonebs  - Chest physiotherapy  - Acapella  - IS

## 2024-05-21 NOTE — ASSESSMENT & PLAN NOTE
Patient reporting left left pain and weakness associated with pain. Patient has known severe atherosclerosis which may be associated with pain/PAD as is worse with ambulation. Will rule out slipped disk/fracture. Patient has remote history of left hip fracture.  L spine, pelvis, left hip, left femur, left knee, left tib/fib xrays unrevealing    - PT/OT  - PT recommending rehab, PM&R consulted

## 2024-05-21 NOTE — TELEPHONE ENCOUNTER
----- Message from Melanie Mccormick sent at 5/21/2024 12:37 PM CDT -----  Regarding: Patient Advice              Name of Who is Calling:    Patient's     Who Left The Message:     Patient's            What is the request in detail:         As per patient's ; his wife is presently in ICU at [facility] and he would like a letter to present to the [employer] advising that since he's his wife is in ICU he has to postpone their trip to [facility].  Patient's  states he will send the exact  wording thru the [employer] [facility]ro.   Please further advise.   Thank you      Reply by MY [facility]: NO      Preferred Call Back : [phone] (m)

## 2024-05-21 NOTE — HOSPITAL COURSE
5/20/24: Participated w/ PT. Sit <> Stand Transfer: Moderate Assistance from eob with no AD, Bed <> Chair: Total Assistance with no AD. Pt unable to take steps during transfer   5/22/24: Participated w/ OT. Feeding: set-up  G/H seated with SBA; unable to tolerate standing g/h skills due to impaired endurance.   LE dressing; TOTAL A   5/26/24: Participated w/ PT. Sit to Stand:  contact guard assistance with rolling walker. Gait: pt received gait training ~ 3 steps forward with RW, O2 and CGA. Pt was followed by chair for safety

## 2024-05-21 NOTE — PLAN OF CARE
MICU DAILY GOALS     Family/Goals of care/Code Status   Code Status: Full Code    24H Vital Sign Range  Temp:  [97.5 °F (36.4 °C)-98.7 °F (37.1 °C)]   Pulse:  []   Resp:  [12-39]   BP: (102-147)/(61-80)   SpO2:  [92 %-99 %]      Shift Events (include procedures and significant events)   No acute events throughout shift. Administered medications per orders, pt tolerated well. Strict I and O maintained. Pt to bedside chair for 2 hrs.    AWAKE RASS: Goal - RASS Goal: 0-->alert and calm  Actual - RASS ([place] Agitation-Sedation Scale): alert and calm    Restraint necessity: Not necessary   BREATHE SBT: Not intubated    Coordinate A & B, analgesics/sedatives Pain: managed   SAT: Not intubated   Delirium CAM-ICU: Overall CAM-ICU: Negative   Early(intubated/ Progressive (non-intubated) Mobility MOVE Screen (INTUBATED ONLY): Not intubated    Activity: Activity Management: Rolling - L1   Feeding/Nutrition Diet order: Diet/Nutrition Received: clear liquid,     Thrombus DVT prophylaxis: VTE Required Core Measure: Pharmacological prophylaxis initiated/maintained   HOB Elevation Head of Bed (HOB) Positioning: HOB elevated   Ulcer Prophylaxis GI: yes   Glucose control managed     Skin Skin assessed during: Daily Assessment    Sacrum intact/not altered? Yes  Heels intact/not altered? Yes  Surgical wound? Yes    CHECK ONE!   (no altered skin or altered skin) and sub boxes:  [] No Altered Skin Integrity Present    []Prevention Measures Documented    [x] Altered Skin Integrity Present or Discovered   [x] LDA present in EPIC, daily doc completed              [x] LDA added if not in EPIC (describe wound).                    When describing wound, do not stage, use descriptive words only.    [x] Wound Image Taken (required on admit,                   transfer/discharge and every Tuesday)    Wound Care Consulted? Yes    4 EYES:  Attending Nurse (1st set of eyes): BARRY Banks    Second RN/Staff Member (2nd set of eyes): JCARLOS Pacheco    Bowel Function diarrhea    Indwelling Catheter Necessity [REMOVED]      Urethral Catheter 05/15/24 1600-Reason for Continuing Urinary Catheterization: Critically ill in ICU and requiring hourly monitoring of intake/output       NA   De-escalation Antibiotics Yes        VS and assessment per flow sheet, patient progressing towards goals as tolerated, plan of care reviewed with family, all concerns addressed, will continue to monitor.

## 2024-05-21 NOTE — ASSESSMENT & PLAN NOTE
Jessica Floyd is a 73 y.o. female who presented with a GIB s/p IR embolization of a tertiary branch of the ileal artery now with severe LLQ pain. She is now s/p diagnostic laparotomy converted to ex lap with small bowel resection and primary tissue repair of bilateral inguinal hernias.     - Can advance to regular diet  - Aspiration precautions  - Replete lytes PRN   - K>4, Mag>2, Phos>3  - Abx per primary  - Okay for PT/OT. Would recommend early mobilization as tolerated  - Remainder of care per primary team  - Please contact general surgery with any questions, concerns, or clinical status changes      Dispo: Remainder of care per MICU

## 2024-05-21 NOTE — SUBJECTIVE & OBJECTIVE
Interval History: NAEON. Patient had difficulty sleeping. She tolerated a CLD without emesis. Had two bowel movements yesterday.     Medications:  Continuous Infusions:   dexmedeTOMIDine (Precedex) infusion (titrating)  0-1.4 mcg/kg/hr Intravenous Continuous   Stopped at 05/20/24 1901     Scheduled Meds:   albuterol-ipratropium  3 mL Nebulization Q6H WAKE    cyanocobalamin  1,000 mcg Intramuscular Weekly    enoxparin  40 mg Subcutaneous Q24H (prophylaxis, 1700)    LIDOcaine  1 patch Transdermal Q24H    LIDOcaine  1 patch Transdermal Q24H    pantoprazole  40 mg Intravenous Daily    piperacillin-tazobactam (Zosyn) IV (PEDS and ADULTS) (extended infusion is not appropriate)  4.5 g Intravenous Q8H    QUEtiapine  50 mg Oral QHS     PRN Meds:  Current Facility-Administered Medications:     acetaminophen, 650 mg, Oral, Q6H PRN    albuterol-ipratropium, 3 mL, Nebulization, Q4H PRN    ondansetron, 4 mg, Intravenous, Q4H PRN    sodium chloride, 1 spray, Each Nostril, PRN    sodium chloride 0.9%, 10 mL, Intravenous, PRN     Review of patient's allergies indicates:  No Known Allergies  Objective:     Vital Signs (Most Recent):  Temp: 98.7 °F (37.1 °C) (05/21/24 0705)  Pulse: 108 (05/21/24 0705)  Resp: (!) 31 (05/21/24 0705)  BP: 116/66 (05/21/24 0705)  SpO2: 97 % (05/21/24 0705) Vital Signs (24h Range):  Temp:  [97.7 °F (36.5 °C)-99.7 °F (37.6 °C)] 98.7 °F (37.1 °C)  Pulse:  [] 108  Resp:  [12-37] 31  SpO2:  [92 %-99 %] 97 %  BP: ()/(59-80) 116/66  Arterial Line BP: (105-177)/() 177/118     Weight: 52.6 kg (115 lb 15.4 oz)  Body mass index is 20.54 kg/m².    Intake/Output - Last 3 Shifts         05/19 0700 05/20 0659 05/20 0700  05/21 0659 05/21 0700 05/22 0659    P.O.  410     I.V. (mL/kg) 873 (16.6) 56.6 (1.1)     IV Piggyback 898.3 276.4     Total Intake(mL/kg) 1771.2 (33.7) 743 (14.1)     Urine (mL/kg/hr) 1950 (1.5) 925 (0.7)     Drains 100      Stool  0     Total Output 2050 925     Net -278.8 -182             Stool Occurrence  1 x              Physical Exam  Vitals and nursing note reviewed.   Constitutional:       General: She is not in acute distress.     Appearance: She is well-developed. She is not diaphoretic.      Interventions: Nasal cannula in place.      Comments: O2 via NC   HENT:      Head: Normocephalic and atraumatic.      Mouth/Throat:      Mouth: Mucous membranes are moist.      Pharynx: Oropharynx is clear. No oropharyngeal exudate.   Eyes:      General: No scleral icterus.     Extraocular Movements: Extraocular movements intact.   Cardiovascular:      Rate and Rhythm: Normal rate.   Pulmonary:      Effort: Pulmonary effort is normal. No respiratory distress.   Abdominal:      General: There is no distension.      Palpations: Abdomen is soft.      Tenderness: There is no abdominal tenderness. There is no guarding or rebound.      Comments: Incision is clean, dry, and intact without drainage, erythema, or increased warmth. Appropriately TTP.   Genitourinary:     Comments: Maldonado in place  Musculoskeletal:         General: No deformity.   Skin:     General: Skin is warm and dry.      Coloration: Skin is not jaundiced.   Neurological:      General: No focal deficit present.      Mental Status: She is alert and oriented to person, place, and time.      Cranial Nerves: No cranial nerve deficit.          Significant Labs:  I have reviewed all pertinent lab results within the past 24 hours.    Significant Diagnostics:  I have reviewed all pertinent imaging results/findings within the past 24 hours.

## 2024-05-21 NOTE — PROGRESS NOTES
[facility] - Cardiac Medical ICU  General Surgery  Progress Note    Subjective:     History of Present Illness:  Jessica Floyd is a 73 y.o. female with a history of HTN, HFpEF, CKD3, GERD, and CAD who presented to the ED on 5/14/24 with melena. She is altered on my interview and so the history was collected from chart review and the family. Appears she was feeling light headed and as though she was going to pass out. She also noted hematochezia. Upon arrival she was AF, tachycardic, and hypotensive. CTA was concerning for GIB and so she was taken to IR where she was found to have extravasation from the ileal branch of the SMA. They performed a coil embolization of a tertiary branch. Following this, she was reportedly doing well from a mentation standpoint, but has continued to require blood transfusion. She had an EGD on 5/15 and C-scope on 5/16 neither of which revealed a source for her bleed although GI was not able to reach the cecum due to tortuosity. Over the last couple of days, she has had worsening abdominal pain and she is not able to participate with my interview due to AMS. Her family feels this is pain related although she has been received narcotics and ativan PRN.      She had a low grade fever to 100.6 earlier today. Currently tachycardic to the 100s but HDS. Her CBC is notable for WBC 40 (stable from prior) and H/H 8.6/25.5. her CMP shows a 2 bili of 2.2 but is otherwise unremarkable. Lactic acid is normal at 1.3. She had a repeat CT A/P today which showed dilated bowel consistent with an ileus. However, per her family and the primary team, her pain seems out of proportion to the findings.     Post-Op Info:  Procedure(s) (LRB):  LAPAROSCOPY, DIAGNOSTIC (N/A)  LAPAROTOMY, EXPLORATORY (N/A)  EXCISION, SMALL INTESTINE (N/A)  REPAIR, HERNIA, INGUINAL (Bilateral)   4 Days Post-Op     Interval History: NAEON. Patient had difficulty sleeping. She tolerated a CLD without emesis. Had two bowel movements  yesterday.     Medications:  Continuous Infusions:   dexmedeTOMIDine (Precedex) infusion (titrating)  0-1.4 mcg/kg/hr Intravenous Continuous   Stopped at 05/20/24 1901     Scheduled Meds:   albuterol-ipratropium  3 mL Nebulization Q6H WAKE    cyanocobalamin  1,000 mcg Intramuscular Weekly    enoxparin  40 mg Subcutaneous Q24H (prophylaxis, 1700)    LIDOcaine  1 patch Transdermal Q24H    LIDOcaine  1 patch Transdermal Q24H    pantoprazole  40 mg Intravenous Daily    piperacillin-tazobactam (Zosyn) IV (PEDS and ADULTS) (extended infusion is not appropriate)  4.5 g Intravenous Q8H    QUEtiapine  50 mg Oral QHS     PRN Meds:  Current Facility-Administered Medications:     acetaminophen, 650 mg, Oral, Q6H PRN    albuterol-ipratropium, 3 mL, Nebulization, Q4H PRN    ondansetron, 4 mg, Intravenous, Q4H PRN    sodium chloride, 1 spray, Each Nostril, PRN    sodium chloride 0.9%, 10 mL, Intravenous, PRN     Review of patient's allergies indicates:  No Known Allergies  Objective:     Vital Signs (Most Recent):  Temp: 98.7 °F (37.1 °C) (05/21/24 0705)  Pulse: 108 (05/21/24 0705)  Resp: (!) 31 (05/21/24 0705)  BP: 116/66 (05/21/24 0705)  SpO2: 97 % (05/21/24 0705) Vital Signs (24h Range):  Temp:  [97.7 °F (36.5 °C)-99.7 °F (37.6 °C)] 98.7 °F (37.1 °C)  Pulse:  [] 108  Resp:  [12-37] 31  SpO2:  [92 %-99 %] 97 %  BP: ()/(59-80) 116/66  Arterial Line BP: (105-177)/() 177/118     Weight: 52.6 kg (115 lb 15.4 oz)  Body mass index is 20.54 kg/m².    Intake/Output - Last 3 Shifts         05/19 0700  05/20 0659 05/20 0700  05/21 0659 05/21 0700  05/22 0659    P.O.  410     I.V. (mL/kg) 873 (16.6) 56.6 (1.1)     IV Piggyback 898.3 276.4     Total Intake(mL/kg) 1771.2 (33.7) 743 (14.1)     Urine (mL/kg/hr) 1950 (1.5) 925 (0.7)     Drains 100      Stool  0     Total Output 2050 925     Net -278.8 -182            Stool Occurrence  1 x              Physical Exam  Vitals and nursing note reviewed.   Constitutional:        General: She is not in acute distress.     Appearance: She is well-developed. She is not diaphoretic.      Interventions: Nasal cannula in place.      Comments: O2 via NC   HENT:      Head: Normocephalic and atraumatic.      Mouth/Throat:      Mouth: Mucous membranes are moist.      Pharynx: Oropharynx is clear. No oropharyngeal exudate.   Eyes:      General: No scleral icterus.     Extraocular Movements: Extraocular movements intact.   Cardiovascular:      Rate and Rhythm: Normal rate.   Pulmonary:      Effort: Pulmonary effort is normal. No respiratory distress.   Abdominal:      General: There is no distension.      Palpations: Abdomen is soft.      Tenderness: There is no abdominal tenderness. There is no guarding or rebound.      Comments: Incision is clean, dry, and intact without drainage, erythema, or increased warmth. Appropriately TTP.   Genitourinary:     Comments: Maldonado in place  Musculoskeletal:         General: No deformity.   Skin:     General: Skin is warm and dry.      Coloration: Skin is not jaundiced.   Neurological:      General: No focal deficit present.      Mental Status: She is alert and oriented to person, place, and time.      Cranial Nerves: No cranial nerve deficit.          Significant Labs:  I have reviewed all pertinent lab results within the past 24 hours.    Significant Diagnostics:  I have reviewed all pertinent imaging results/findings within the past 24 hours.  Assessment/Plan:     * Acute lower GI bleeding  Jessica Floyd is a 73 y.o. female who presented with a GIB s/p IR embolization of a tertiary branch of the ileal artery now with severe LLQ pain. She is now s/p diagnostic laparotomy converted to ex lap with small bowel resection and primary tissue repair of bilateral inguinal hernias.     - Can advance to regular diet  - Aspiration precautions  - Replete lytes PRN   - K>4, Mag>2, Phos>3  - Abx per primary  - Okay for PT/OT. Would recommend early mobilization as tolerated  -  Remainder of care per primary team  - Please contact general surgery with any questions, concerns, or clinical status changes      Dispo: Remainder of care per MICU        Giovany Vanegas MD  General Surgery  [facility] - Card[facility]

## 2024-05-21 NOTE — ASSESSMENT & PLAN NOTE
In the setting of decreased PO intake for multiple days, 1.5L deficit. Received gentle IVF, now advanced to CLD. Will prioritize PO intake.   OBSERVATION

## 2024-05-21 NOTE — TELEPHONE ENCOUNTER
Spoke with patients spouse and he informed me that he will send a message in the patients portal so that we could type up something on the [employer] pedro.

## 2024-05-21 NOTE — CONSULTS
[facility] - Cardiac Medical ICU  Physical Medicine & Rehab  Consult Note    Patient Name: Jessica Floyd  MRN: [id number]  Admission Date: 5/14/2024  Hospital Length of Stay: 7 days  Attending Physician: Edouard Giles MD     Inpatient consult to Physical Medicine & Rehabilitation  Consult performed by: Niyah Lawler NP  Consult requested by:  Edouard Giles MD    Collaborating Physician: Ruby Dooley MD  Reason for Consult:  Assess rehabilitation needs      Consults  Subjective:     Principal Problem: Acute lower GI bleeding    HPI: Jessica Floyd is a 73-year-old female with PMHx of L eye blindness, R eye visual impairment, fall 5/27/23 and was found to have a left femoral neck fracture and s/p left total hip arthroplasty for femoral neck fracture on 5/28/23, COPD on home 2L NC (per  has been on room air), pulm HTN, R carotid stent on ASA, HF, anemia, and ERIK on infusions. Patient [place] to [facility] [place]24 for acute lower GI bleed. S/p embolization with IR 5/14. Colonoscopy performed 5/16 without evidence of active bleed, old blood/clots evacuated. Hospital course complicated by worsening abdominal pain, general surgery consulted for further evaluation. Patient underwent diagnostic laparotomy converted to ex lap with small bowel resection and primary tissue repair of bilateral inguinal hernias 5/17/24.     Functional History: Patient lives with  in a single story home with 3 steps to enter. Prior to admission, [place]. DME: rollator.     Hospital Course:   5/19/24: Participated w/ PT. Patient ambulated: 2ft to chair    Patient required: moderate assist. Patient used: rolling walker  5/20/24: Participated w/ PT. Sit <> Stand Transfer: Moderate Assistance from eob with no AD, Bed <> Chair: Total Assistance with no AD. Pt unable to take steps during transfer     Past Medical History:   Diagnosis Date    Allergic rhinitis     Amblyopia     Carotid stenosis     Coronary atherosclerosis      Outside [facility] 6/2014: 20% mLAD, 50% mCx, 20%, mRCA    Dyslipidemia     ESBL (extended spectrum beta-lactamase) producing bacteria infection     UTI    Hypertension     Nausea and vomiting 05/26/2017    Pulmonary emphysema 10/28/2023    SAH (subarachnoid hemorrhage) 08/24/2016    1999, s/p clipping    Subarachnoid hemorrhage due to ruptured aneurysm 1999     Past Surgical History:   Procedure Laterality Date    ADENOIDECTOMY      ANTERIOR CRUCIATE LIGAMENT REPAIR  2012    APPENDECTOMY      CATARACT EXTRACTION W/  INTRAOCULAR LENS IMPLANT Right 11/07/2022    Procedure: EXTRACTION, CATARACT, WITH IOL INSERTION;  Surgeon: Higinio Cristina MD;  Location: [facility];  Service: Ophthalmology;  Laterality: Right;    CATARACT EXTRACTION W/  INTRAOCULAR LENS IMPLANT Left 11/21/2022    Procedure: EXTRACTION, CATARACT, WITH IOL INSERTION;  Surgeon: Higinio Cristina MD;  Location: [facility];  Service: Ophthalmology;  Laterality: Left;    CEREBRAL ANEURYSM REPAIR  1999    clip    COLONOSCOPY N/A 5/16/2024    Procedure: COLONOSCOPY;  Surgeon: Rich Syed MD;  Location: [facility] ([address] [place] [address]);  Service: Endoscopy;  Laterality: N/A;    DENTAL SURGERY      DIAGNOSTIC LAPAROSCOPY N/A 5/17/2024    Procedure: LAPAROSCOPY, DIAGNOSTIC;  Surgeon: Ruben Oscar MD;  Location: [facility] OR 2ND FLR;  Service: General;  Laterality: N/A;    ESOPHAGOGASTRODUODENOSCOPY N/A 5/15/2024    Procedure: EGD (ESOPHAGOGASTRODUODENOSCOPY);  Surgeon: Rich Syed MD;  Location: [facility] (2ND FLR);  Service: Endoscopy;  Laterality: N/A;    EXCISION, SMALL INTESTINE N/A 5/17/2024    Procedure: EXCISION, SMALL INTESTINE;  Surgeon: Ruben Oscar MD;  [facility];  Service: General;  Laterality: N/A;    EYE SURGERY Bilateral     cataract removal and lens implants    HIP ARTHROPLASTY Left 05/28/2023    Procedure: TOTAL HIP ARTHROPLASTY;  Surgeon: Juan Wan MD;  Location: [facility] OR 2ND FLR;  Service: Orthopedics;  Laterality:  Left;    LAPAROTOMY, EXPLORATORY N/A 2024    Procedure: LAPAROTOMY, EXPLORATORY;  Surgeon: Ruben Oscar MD;  [facility];  Service: General;  Laterality: N/A;    REPAIR, HERNIA, INGUINAL Bilateral 2024    Procedure: REPAIR, HERNIA, INGUINAL;  Surgeon: Ruben Oscar MD;  Location: [facility] OR 2ND FLR;  Service: General;  Laterality: Bilateral;    TONSILLECTOMY       Review of patient's allergies indicates:  No Known Allergies    Scheduled Medications:    albuterol-ipratropium  3 mL Nebulization Q6H WAKE    cyanocobalamin  1,000 mcg Intramuscular Weekly    enoxparin  40 mg Subcutaneous Q24H (prophylaxis, 1700)    LIDOcaine  1 patch Transdermal Q24H    LIDOcaine  1 patch Transdermal Q24H    magnesium sulfate IVPB  2 g Intravenous Once    [START ON 2024] pantoprazole  40 mg Oral Daily    piperacillin-tazobactam (Zosyn) IV (PEDS and ADULTS) (extended infusion is not appropriate)  4.5 g Intravenous Q8H    QUEtiapine  50 mg Oral QHS       PRN Medications:   Current Facility-Administered Medications:     sodium chloride 0.9%, , Intravenous, PRN    acetaminophen, 650 mg, Oral, Q6H PRN    albuterol-ipratropium, 3 mL, Nebulization, Q4H PRN    ondansetron, 4 mg, Intravenous, Q4H PRN    sodium chloride, 1 spray, Each Nostril, PRN    sodium chloride 0.9%, 10 mL, Intravenous, PRN    Family History       Problem Relation (Age of Onset)    Alcohol abuse Father    Aneurysm Mother    Gout Brother    Heart disease Brother    Hypertension Mother, Father    Kidney disease Brother    No Known Problems Daughter, Daughter, Daughter          Tobacco Use    Smoking status: Former     Current packs/day: 0.00     Average packs/day: 0.5 packs/day for 20.0 years (10.0 ttl pk-yrs)     Types: Cigarettes     Start date: 1979     Quit date: 1999     Years since quittin.4     Passive exposure: Past    Smokeless tobacco: Never   Substance and Sexual Activity    Alcohol use: Yes     Alcohol/week:  7.0 standard drinks of alcohol     Types: 7 Glasses of wine per week     Comment: One glass of Red wine prior to dinner    Drug use: No    Sexual activity: Yes     Partners: Male     Review of Systems   Constitutional:  Positive for activity change and fatigue. Negative for fever.   Respiratory:  Negative for cough and shortness of breath.    Musculoskeletal:  Positive for gait problem.   Neurological:  Positive for weakness.   Psychiatric/Behavioral:  Negative for agitation, behavioral problems and confusion.      Objective:     Vital Signs (Most Recent):  Temp: 98.7 °F (37.1 °C) (05/21/24 0705)  Pulse: 105 (05/21/24 0900)  Resp: 17 (05/21/24 0900)  BP: 118/67 (05/21/24 0900)  SpO2: (!) 94 % (05/21/24 0900)    Vital Signs (24h Range):  Temp:  [97.7 °F (36.5 °C)-98.7 °F (37.1 °C)] 98.7 °F (37.1 °C)  Pulse:  [] 105  Resp:  [12-37] 17  SpO2:  [92 %-99 %] 94 %  BP: ()/(59-80) 118/67  Arterial Line BP: (177)/(118) 177/118     Body mass index is 20.54 kg/m².     Physical Exam  Vitals and nursing note reviewed.   HENT:      Head: Normocephalic and atraumatic.      Nose: Nose normal.      Mouth/Throat:      Mouth: Mucous membranes are moist.   Eyes:      Extraocular Movements: Extraocular movements intact.      Pupils: Pupils are equal, round, and reactive to light.   Pulmonary:      Effort: Pulmonary effort is normal. No respiratory distress.   Musculoskeletal:      Cervical back: Normal range of motion.      Comments: BLE weakness, LLE>RLE  Deconditioned    Skin:     General: Skin is warm.   Neurological:      Mental Status: She is alert. Mental status is at baseline.      Motor: Weakness present.      Gait: Gait abnormal.   Psychiatric:         Mood and Affect: Mood normal.         Behavior: Behavior normal.     Diagnostic Results:   Labs: Reviewed  ECG: Reviewed  CT: Reviewed    Assessment/Plan:     * Acute lower GI bleeding  - S/p embolization with IR 5/14.   - Colonoscopy performed 5/16 without evidence  of active bleed, old blood/clots evacuated.   - underwent diagnostic laparotomy converted to ex lap with small bowel resection and primary tissue repair of bilateral inguinal hernias 5/17/24.     Impaired mobility  - Related to prolonged/acute hospital course.     Recommendations  -  Encourage mobility, OOB in chair at least 3 hours per day, and early ambulation as appropriate  -  PT/OT evaluate and treat  -  Pain management  -  Monitor for and prevent skin breakdown and pressure ulcers  Early mobility, repositioning/weight shifting every 20-30 minutes when sitting, turn patient every 2 hours, proper mattress/overlay and chair cushioning, pressure relief/heel protector boots  -  DVT prophylaxis    -  Reviewed discharge options (IP rehab, SNF, HH therapy, and OP therapy)     PM&R Recommendation:     At this time, the PM&R team has reviewed this patient's ongoing medical case including inpatient diagnosis, medical history, clinical examination, labs, vitals, current social and functional history to provide the post-acute recommendation as follows:     RECOMMENDATIONS: inpatient rehabilitation due to good motivation/participation with therapies, has been determined to tolerate 3 hours of therapy and good potential for recovery.     The patient will be admitted for comprehensive interdisciplinary inpatient rehabilitation to address the impairments due to medical diagnosis of Debility. The patient will benefit from an inpatient rehabilitation program to promote functional recovery, implement compensatory strategies and will undergo assessment for needs for durable medical equipment for safe discharge to the community. This patient will benefit from a coordinated interdisciplinary rehabilitation program services that require close monitoring and treatment with 24-hour rehabilitative nursing and physical/occupational therapies for 3 hours/day for 5 days/week.This interdisciplinary program will be performed under the  direction of a physiatrist.    MEDICAL STABILITY:     At this time, barriers for post-acute rehab admission include tolerating diet and abx plan.     We will continue to follow.     Thank you for your consult.     Niyah Lawler NP  Department of Physical Medicine & Rehab  [facility] - Cardiac Medical ICU

## 2024-05-21 NOTE — ASSESSMENT & PLAN NOTE
Leukocytosis on admission, initially thought to be acute phase reactant to GIB as patient afebrile.   Bcx2 NGTD    - Started on IV Zosyn for concern for intraabdominal infection given worsening abdominal pain, distension

## 2024-05-21 NOTE — ASSESSMENT & PLAN NOTE
- S/p embolization with IR 5/14.   - Colonoscopy performed 5/16 without evidence of active bleed, old blood/clots evacuated.   - underwent diagnostic laparotomy converted to ex lap with small bowel resection and primary tissue repair of bilateral inguinal hernias 5/17/24.

## 2024-05-21 NOTE — PROGRESS NOTES
"[facility] - Cardiac Medical ICU  Critical Care Medicine  Progress Note    Patient Name: Jessica Floyd  MRN: [id number]  Admission Date: 5/14/2024  Hospital Length of Stay: 7 days  Code Status: Full Code  Attending Provider: Edouard Giles MD  Primary Care Provider: Effie Olmedo MD   Principal Problem: Acute lower GI bleeding    Subjective:     HPI:  Mrs. Floyd is a 73 year old female with PMH notable for COPD on home 2L NC, pulm HTN, R carotid stent on ASA, HFpEF, anemia, and ERIK on infusions who [place] [facility] [place].  is at bedside and reports that the patient had an episode of diarrhea and lethargy yesterday. She reports that it felt as if "she was going to pass out", so she was using her walker to get around. Patient's  reports that this has never happened before. Patient denies excessive OTC NSAID use. She reports that she drinks 2 wine glasses a day. This morning when the patient woke up she felt very lethargic. Patient went to use the bathroom, and  noted bright red stool in the bowl. Patient reported a pre syncopal episode and felt lightheaded. She admits to weakness, SOB, light-headedness, hematochezia, and pre syncope. Patient denies nausea, vomiting, hematemesis, falls, confusion, chest pain, urinary changes, and fevers. Patient reports taking aspirin daily but no DOAC.     Hemoglobin 5.9 at presentation to ED (baseline ~10). GI and IR consulted given concern for active bleed seen on CTA. In the emergency department she was given 1L IVF and a 80 mg IV protonix bolus. Critical care medicine. consulted for GIB. CTA in the Ed revealed "Acute gastrointestinal bleed at the level of the cecum". IR planning for embolization today. Patient is also receiving 2 pRBC due to acute anemia.     Hospital/ICU Course:  Admitted to ICU for lower GIB s/p embolization with IR 5/14. Colonoscopy performed 5/16 without evidence of active bleed, old blood/clots evacuated. Patient is s/p " 8U pRBCs, 1 FFP, 1 platelet. Course complicated by worsening abdominal pain, general surgery consulted for further evaluation. Patient underwent ex lap 5/17 and found to have necrotic bowel at site of incarcerated hernia. On zosyn for intraabdominal coverage, BC NGTD. Patient oversedated on 5/18 and required BIPAP briefly for CO2 retention. Subsequently placed on precedex for agitation and IV tylenol for pain control, tolerating well.    Interval History/Significant Events: NAEO, did not get precedex overnight and subsequently did not sleep. She otherwise feels improvement in respiratory status and leg pain. She did have a large bowel movement this morning that was noted to be dark, concerning for blood. Repeat CBC was stable, likely residual blood, but will continue to monitor    Review of Systems   Constitutional:  Positive for fatigue.   Eyes:  Negative for visual disturbance.   Respiratory:  Negative for cough and shortness of breath.    Cardiovascular:  Negative for chest pain and leg swelling.   Gastrointestinal:  Negative for abdominal pain, diarrhea, nausea and vomiting.   Neurological:  Positive for weakness (left leg).   Psychiatric/Behavioral:  Negative for confusion.      Objective:     Vital Signs (Most Recent):  Temp: 98.7 °F (37.1 °C) (05/21/24 0705)  Pulse: 105 (05/21/24 0900)  Resp: 17 (05/21/24 0900)  BP: 118/67 (05/21/24 0900)  SpO2: (!) 94 % (05/21/24 0900) Vital Signs (24h Range):  Temp:  [97.7 °F (36.5 °C)-99.7 °F (37.6 °C)] 98.7 °F (37.1 °C)  Pulse:  [] 105  Resp:  [12-37] 17  SpO2:  [92 %-99 %] 94 %  BP: ()/(59-80) 118/67  Arterial Line BP: (105-177)/() 177/118   Weight: 52.6 kg (115 lb 15.4 oz)  Body mass index is 20.54 kg/m².      Intake/Output Summary (Last 24 hours) at 5/21/2024 1101  Last data filed at 5/21/2024 1028  Gross per 24 hour   Intake 761.73 ml   Output 1025 ml   Net -263.27 ml          Physical Exam  Vitals and nursing note reviewed.   Constitutional:        General: She is not in acute distress.  HENT:      Head: Normocephalic and atraumatic.      Mouth/Throat:      Mouth: Mucous membranes are moist.   Eyes:      General: No scleral icterus.     Conjunctiva/sclera: Conjunctivae normal.   Cardiovascular:      Rate and Rhythm: Normal rate and regular rhythm.      Heart sounds: Normal heart sounds. No murmur heard.  Pulmonary:      Effort: Pulmonary effort is normal. No respiratory distress.   Abdominal:      General: Abdomen is flat. There is no distension.      Palpations: Abdomen is soft.      Tenderness: There is abdominal tenderness (Mild). There is no guarding or rebound.      Comments: Less distension, surgical wound dressings clean and intact   Musculoskeletal:         General: No tenderness.      Cervical back: Normal range of motion and neck supple.      Right lower leg: No edema.      Left lower leg: No edema.   Skin:     General: Skin is warm and dry.      Coloration: Skin is not jaundiced or pale.   Neurological:      Mental Status: She is alert and oriented to person, place, and time. Mental status is at baseline.   Psychiatric:         Mood and Affect: Mood normal.         Behavior: Behavior normal.            Vents:  Oxygen Concentration (%): 24 (05/21/24 0816)  Lines/Drains/Airways       Peripheral Intravenous Line  Duration                  Peripheral IV - Single Lumen 05/18/24 0715 20 G;1 3/4 in Anterior;Right Forearm 3 days         Peripheral IV - Single Lumen 05/18/24 0900 20 G;1 3/4 in Anterior;Left Upper Arm 3 days                  Significant Labs:    CBC/Anemia Profile:  Recent Labs   Lab 05/20/24  0749 05/20/24  1056 05/20/24  2025 05/21/24  0817   WBC 17.49*  --  16.16* 15.52*   HGB 9.3*  --  9.3* 9.6*   HCT 29.0* 21* 29.1* 30.1*     --  205 207   MCV 94  --  95 95   RDW 18.5*  --  18.1* 17.4*        Chemistries:  Recent Labs   Lab 05/20/24  0333 05/21/24  0434   * 141   K 3.3* 3.5   * 105   CO2 26 25   BUN 10 11   CREATININE  0.7 0.8   CALCIUM 7.3* 7.3*   ALBUMIN 1.6* 1.6*   PROT 4.7* 4.9*   BILITOT 0.8 0.8   ALKPHOS 92 77   ALT 62* 61*   * 114*   MG 2.1 1.7   PHOS 2.2* 2.7       All pertinent labs within the past 24 hours have been reviewed.    Significant Imaging:  I have reviewed all pertinent imaging results/findings within the past 24 hours.    ABG  Recent Labs   Lab 05/20/24  1056   PH 7.490*   PO2 69*   PCO2 26.5*   HCO3 20.2*   BE -3*     Assessment/Plan:     Pulmonary  Pulmonary emphysema  Not on oxygen at home.     -Home inhalers held in the setting of worsening ileus, resume when appropriate   -Duonebs scheduled    Cardiac/Vascular  (HFpEF) heart failure with preserved ejection fraction  Noted in history but last echo reveals normal function. Holding home coreg.    Transthoracic echo (TTE) complete 10/29/2023    Interpretation Summary    Left Ventricle: The left ventricle is normal in size. Normal wall thickness. Normal wall motion. There is normal systolic function with a visually estimated ejection fraction of 60 - 65%. There is normal diastolic function.    Right Ventricle: Mild right ventricular enlargement. Wall thickness is normal. Right ventricle wall motion  is normal. Systolic function is normal.    Aortic Valve: The aortic valve is a unicuspid valve. There is mild aortic valve sclerosis. There is trace aortic regurgitation.    Mitral Valve: There is mild regurgitation.    Pulmonary Artery: There is mild pulmonary hypertension. The estimated pulmonary artery systolic pressure is 40 mmHg.    IVC/SVC: Intermediate venous pressure at 8 mmHg.        Hypertension  Hypertension Medications                    amLODIPine (NORVASC) 5 MG tablet TAKE 1 TABLET BY MOUTH EVERY DAY     carvediloL (COREG) 6.25 MG tablet Take 1 tablet (6.25 mg total) by mouth 2 (two) times daily with meals.     hydroCHLOROthiazide (HYDRODIURIL) 25 MG tablet Take 1 tablet (25 mg total) by mouth once daily.       -Will hold antihypertensives in  setting of acute bleed and hypotension    Bilateral carotid artery stenosis  April 2022     No evidence of hemodynamically significant stenosis is demonstrated in the extracranial carotid arteries.  Patent right carotid stent    Renal/  Hypernatremia  In the setting of decreased PO intake for multiple days, 1.5L deficit. Received gentle IVF, now advanced to CLD. Will prioritize PO intake.    Oncology  Acute blood loss anemia  See acute lower GIB    Leukocytosis  Leukocytosis on admission, initially thought to be acute phase reactant to GIB as patient afebrile.   Bcx2 NGTD    - Started on IV Zosyn for concern for intraabdominal infection given worsening abdominal pain, distension    GI  * Acute lower GI bleeding  73 year old female with multiple medical problems presenting with melena and some bright red blood with associated pre-syncope found to have hgb of 5.9 (from baseline ~10). CTA with acute gastrointestinal bleed at the level of the cecum. Patient had embolization by IR but continued to to have larger volume dark red blood in stool with falling hgb, repeat CTA without active bleed.     S/p 9U pRBCs, 1 FFP, 1 platelet      Colonoscopy performed 5/16 with no evidence of active bleed, old blood and clots evacuated  IR consulted, embolization performed 5/14    -- Advance diet to regular  -- Protonix 40 mg qd  -- Hold all A/C and A/P agents   -- Correct any coagulopathy with platelets and FFP for goal of platelets > 50K and INR <2.0   -- Maintain large bore IV access   -- MAP > 65 goal   -- Maintain type and screen   -- Trend CBCs    Incarcerated inguinal hernia  See bowel ischemia    Ischemia, bowel  Pain out of proportion to exam  S/p ex lap 5/17 with incarcerated hernia repair with bowel resection per general surgery  General surgery consulted, following  H/H stable    Ileus  (Resolved)  Noted on CT A/P    Orthopedic  Left leg pain  Patient reporting left left pain and weakness associated with pain. Patient  has known severe atherosclerosis which may be associated with pain/PAD as is worse with ambulation. Will rule out slipped disk/fracture. Patient has remote history of left hip fracture.  L spine, pelvis, left hip, left femur, left knee, left tib/fib xrays unrevealing    - PT/OT  - PT recommending rehab, PM&R consulted    Other  History Situational (ie Stress Induced) syncope ([facility] admission 12-25-23  History of syncope. Thought to be reflex mediated. Had pre-syncopal episode at home after having diarrhea but before she noticed active bleeding.     Acute hypoxic on chronic hypercapnic respiratory failure  Patient with Hypercapnic and Hypoxic Respiratory failure which is Acute.  she is on home oxygen at 2 LPM. Signs/symptoms of respiratory failure include- respiratory distress. Contributing diagnoses includes - COPD and oversedation.  Labs and images were reviewed. Patient Has recent ABG, which has been reviewed. Will treat underlying causes and adjust management of respiratory failure as follows: Patient required intermittent short term BIPAP, now satting well on 4L NC.    - Duonebs  - Chest physiotherapy  - Acapella  - IS        Critical Care Daily Checklist:     A: Awake: RASS Goal/Actual Goal: RASS Goal: 0-->alert and calm  Actual:     B: Spontaneous Breathing Trial Performed?    C: SAT & SBT Coordinated?  N/A                      D: Delirium: CAM-ICU Overall CAM-ICU: Negative   E: Early Mobility Performed? Yes   F: Feeding Goal:    Status:     Diet Regular   AS: Analgesia/Sedation none   T: Thromboembolic Prophylaxis N/A   H: HOB > 300 Yes   U: Stress Ulcer Prophylaxis (if needed) pantoprazole   G: Glucose Control N/A   B: Bowel Function Stool Occurrence: 1   I: Indwelling Catheter (Lines & Maldonado) Necessity none   D: De-escalation of Antimicrobials/Pharmacotherapies N/A     Plan for the day/ETD Pain control, PT/OT, advance diet     Code Status:  Family/Goals of Care: Full Code              Critical care was  time spent personally by me on the following activities: development of treatment plan with patient or surrogate and bedside caregivers, discussions with consultants, evaluation of patient's response to treatment, examination of patient, ordering and performing treatments and interventions, ordering and review of laboratory studies, ordering and review of radiographic studies, pulse oximetry, re-evaluation of patient's condition. This critical care time did not overlap with that of any other provider or involve time for any procedures.     Raghu Lopez MD  Critical Care Medicine  [facility] - Cardiac [facility]

## 2024-05-21 NOTE — SUBJECTIVE & OBJECTIVE
Interval History/Significant Events: VARUNO, did not get precedex overnight and subsequently did not sleep. She otherwise feels improvement in respiratory status and leg pain. She did have a large bowel movement this morning that was noted to be dark, concerning for blood. Repeat CBC was stable, likely residual blood, but will continue to monitor    Review of Systems   Constitutional:  Positive for fatigue.   Eyes:  Negative for visual disturbance.   Respiratory:  Negative for cough and shortness of breath.    Cardiovascular:  Negative for chest pain and leg swelling.   Gastrointestinal:  Negative for abdominal pain, diarrhea, nausea and vomiting.   Neurological:  Positive for weakness (left leg).   Psychiatric/Behavioral:  Negative for confusion.      Objective:     Vital Signs (Most Recent):  Temp: 98.7 °F (37.1 °C) (05/21/24 0705)  Pulse: 105 (05/21/24 0900)  Resp: 17 (05/21/24 0900)  BP: 118/67 (05/21/24 0900)  SpO2: (!) 94 % (05/21/24 0900) Vital Signs (24h Range):  Temp:  [97.7 °F (36.5 °C)-99.7 °F (37.6 °C)] 98.7 °F (37.1 °C)  Pulse:  [] 105  Resp:  [12-37] 17  SpO2:  [92 %-99 %] 94 %  BP: ()/(59-80) 118/67  Arterial Line BP: (105-177)/() 177/118   Weight: 52.6 kg (115 lb 15.4 oz)  Body mass index is 20.54 kg/m².      Intake/Output Summary (Last 24 hours) at 5/21/2024 1101  Last data filed at 5/21/2024 1028  Gross per 24 hour   Intake 761.73 ml   Output 1025 ml   Net -263.27 ml          Physical Exam  Vitals and nursing note reviewed.   Constitutional:       General: She is not in acute distress.  HENT:      Head: Normocephalic and atraumatic.      Mouth/Throat:      Mouth: Mucous membranes are moist.   Eyes:      General: No scleral icterus.     Conjunctiva/sclera: Conjunctivae normal.   Cardiovascular:      Rate and Rhythm: Normal rate and regular rhythm.      Heart sounds: Normal heart sounds. No murmur heard.  Pulmonary:      Effort: Pulmonary effort is normal. No respiratory distress.    Abdominal:      General: Abdomen is flat. There is no distension.      Palpations: Abdomen is soft.      Tenderness: There is abdominal tenderness (Mild). There is no guarding or rebound.      Comments: Less distension, surgical wound dressings clean and intact   Musculoskeletal:         General: No tenderness.      Cervical back: Normal range of motion and neck supple.      Right lower leg: No edema.      Left lower leg: No edema.   Skin:     General: Skin is warm and dry.      Coloration: Skin is not jaundiced or pale.   Neurological:      Mental Status: She is alert and oriented to person, place, and time. Mental status is at baseline.   Psychiatric:         Mood and Affect: Mood normal.         Behavior: Behavior normal.            Vents:  Oxygen Concentration (%): 24 (05/21/24 0816)  Lines/Drains/Airways       Peripheral Intravenous Line  Duration                  Peripheral IV - Single Lumen 05/18/24 0715 20 G;1 3/4 in Anterior;Right Forearm 3 days         Peripheral IV - Single Lumen 05/18/24 0900 20 G;1 3/4 in Anterior;Left Upper Arm 3 days                  Significant Labs:    CBC/Anemia Profile:  Recent Labs   Lab 05/20/24  0749 05/20/24  1056 05/20/24  2025 05/21/24  0817   WBC 17.49*  --  16.16* 15.52*   HGB 9.3*  --  9.3* 9.6*   HCT 29.0* 21* 29.1* 30.1*     --  205 207   MCV 94  --  95 95   RDW 18.5*  --  18.1* 17.4*        Chemistries:  Recent Labs   Lab 05/20/24  0333 05/21/24  0434   * 141   K 3.3* 3.5   * 105   CO2 26 25   BUN 10 11   CREATININE 0.7 0.8   CALCIUM 7.3* 7.3*   ALBUMIN 1.6* 1.6*   PROT 4.7* 4.9*   BILITOT 0.8 0.8   ALKPHOS 92 77   ALT 62* 61*   * 114*   MG 2.1 1.7   PHOS 2.2* 2.7       All pertinent labs within the past 24 hours have been reviewed.    Significant Imaging:  I have reviewed all pertinent imaging results/findings within the past 24 hours.

## 2024-05-21 NOTE — ASSESSMENT & PLAN NOTE
Noted in history but last echo reveals normal function. Holding home coreg.    Transthoracic echo (TTE) complete 10/29/2023    Interpretation Summary    Left Ventricle: The left ventricle is normal in size. Normal wall thickness. Normal wall motion. There is normal systolic function with a visually estimated ejection fraction of 60 - 65%. There is normal diastolic function.    Right Ventricle: Mild right ventricular enlargement. Wall thickness is normal. Right ventricle wall motion  is normal. Systolic function is normal.    Aortic Valve: The aortic valve is a unicuspid valve. There is mild aortic valve sclerosis. There is trace aortic regurgitation.    Mitral Valve: There is mild regurgitation.    Pulmonary Artery: There is mild pulmonary hypertension. The estimated pulmonary artery systolic pressure is 40 mmHg.    IVC/SVC: Intermediate venous pressure at 8 mmHg.

## 2024-05-21 NOTE — ASSESSMENT & PLAN NOTE
Not on oxygen at home.     -Home inhalers held in the setting of worsening ileus, resume when appropriate   -Misha mckenna

## 2024-05-21 NOTE — SUBJECTIVE & OBJECTIVE
Past Medical History:   Diagnosis Date    Allergic rhinitis     Amblyopia     Carotid stenosis     Coronary atherosclerosis     Outside [facility] 6/2014: 20% mLAD, 50% mCx, 20%, mRCA    Dyslipidemia     ESBL (extended spectrum beta-lactamase) producing bacteria infection     UTI    Hypertension     Nausea and vomiting 05/26/2017    Pulmonary emphysema 10/28/2023    SAH (subarachnoid hemorrhage) 08/24/2016 1999, s/p clipping    Subarachnoid hemorrhage due to ruptured aneurysm 1999     Past Surgical History:   Procedure Laterality Date    ADENOIDECTOMY      ANTERIOR CRUCIATE LIGAMENT REPAIR  2012    APPENDECTOMY      CATARACT EXTRACTION W/  INTRAOCULAR LENS IMPLANT Right 11/07/2022    Procedure: EXTRACTION, CATARACT, WITH IOL INSERTION;  Surgeon: Higinio Cristina MD;  Location: [facility];  Service: Ophthalmology;  Laterality: Right;    CATARACT EXTRACTION W/  INTRAOCULAR LENS IMPLANT Left 11/21/2022    Procedure: EXTRACTION, CATARACT, WITH IOL INSERTION;  Surgeon: Higinio Cristina MD;  Location: [facility] OR;  Service: Ophthalmology;  Laterality: Left;    CEREBRAL ANEURYSM REPAIR  1999    clip    COLONOSCOPY N/A 5/16/2024    Procedure: COLONOSCOPY;  Surgeon: Rich Syed MD;  Location: [facility] ([address] [place] [address]);  Service: Endoscopy;  Laterality: N/A;    DENTAL SURGERY      DIAGNOSTIC LAPAROSCOPY N/A 5/17/2024    Procedure: LAPAROSCOPY, DIAGNOSTIC;  Surgeon: Ruben Oscar MD;  [address];  Service: General;  Laterality: N/A;    ESOPHAGOGASTRODUODENOSCOPY N/A 5/15/2024    Procedure: EGD (ESOPHAGOGASTRODUODENOSCOPY);  Surgeon: Rich Syed MD;  Location: [facility] (2ND FLR);  Service: Endoscopy;  Laterality: N/A;    EXCISION, SMALL INTESTINE N/A 5/17/2024    Procedure: EXCISION, SMALL INTESTINE;  Surgeon: Ruben Oscar MD;  [facility];  Service: General;  Laterality: N/A;    EYE SURGERY Bilateral     cataract removal and lens implants    HIP ARTHROPLASTY Left 05/28/2023    Procedure:  TOTAL HIP ARTHROPLASTY;  Surgeon: Juan Wan MD;  Location: [facility] OR [address] [place] [address];  Service: Orthopedics;  Laterality: Left;    LAPAROTOMY, EXPLORATORY N/A 2024    Procedure: LAPAROTOMY, EXPLORATORY;  Surgeon: Ruben Oscar MD;  [facility];  Service: General;  Laterality: N/A;    REPAIR, HERNIA, INGUINAL Bilateral 2024    Procedure: REPAIR, HERNIA, INGUINAL;  Surgeon: Ruben Oscar MD;  [facility];  Service: General;  Laterality: Bilateral;    TONSILLECTOMY       Review of patient's allergies indicates:  No Known Allergies    Scheduled Medications:    albuterol-ipratropium  3 mL Nebulization Q6H WAKE    cyanocobalamin  1,000 mcg Intramuscular Weekly    enoxparin  40 mg Subcutaneous Q24H (prophylaxis, 1700)    LIDOcaine  1 patch Transdermal Q24H    LIDOcaine  1 patch Transdermal Q24H    magnesium sulfate IVPB  2 g Intravenous Once    [START ON 2024] pantoprazole  40 mg Oral Daily    piperacillin-tazobactam (Zosyn) IV (PEDS and ADULTS) (extended infusion is not appropriate)  4.5 g Intravenous Q8H    QUEtiapine  50 mg Oral QHS       PRN Medications:   Current Facility-Administered Medications:     sodium chloride 0.9%, , Intravenous, PRN    acetaminophen, 650 mg, Oral, Q6H PRN    albuterol-ipratropium, 3 mL, Nebulization, Q4H PRN    ondansetron, 4 mg, Intravenous, Q4H PRN    sodium chloride, 1 spray, Each Nostril, PRN    sodium chloride 0.9%, 10 mL, Intravenous, PRN    Family History       Problem Relation (Age of Onset)    Alcohol abuse Father    Aneurysm Mother    Gout Brother    Heart disease Brother    Hypertension Mother, Father    Kidney disease Brother    No Known Problems Daughter, Daughter, Daughter          Tobacco Use    Smoking status: Former     Current packs/day: 0.00     Average packs/day: 0.5 packs/day for 20.0 years (10.0 ttl pk-yrs)     Types: Cigarettes     Start date: 1979     Quit date: 1999     Years since quittin.4      Passive exposure: Past    Smokeless tobacco: Never   Substance and Sexual Activity    Alcohol use: Yes     Alcohol/week: 7.0 standard drinks of alcohol     Types: 7 Glasses of wine per week     Comment: One glass of Red wine prior to dinner    Drug use: No    Sexual activity: Yes     Partners: Male     Review of Systems   Constitutional:  Positive for activity change and fatigue. Negative for fever.   Respiratory:  Negative for cough and shortness of breath.    Musculoskeletal:  Positive for gait problem.   Neurological:  Positive for weakness.   Psychiatric/Behavioral:  Negative for agitation, behavioral problems and confusion.      Objective:     Vital Signs (Most Recent):  Temp: 98.7 °F (37.1 °C) (05/21/24 0705)  Pulse: 105 (05/21/24 0900)  Resp: 17 (05/21/24 0900)  BP: 118/67 (05/21/24 0900)  SpO2: (!) 94 % (05/21/24 0900)    Vital Signs (24h Range):  Temp:  [97.7 °F (36.5 °C)-98.7 °F (37.1 °C)] 98.7 °F (37.1 °C)  Pulse:  [] 105  Resp:  [12-37] 17  SpO2:  [92 %-99 %] 94 %  BP: ()/(59-80) 118/67  Arterial Line BP: (177)/(118) 177/118     Body mass index is 20.54 kg/m².     Physical Exam  Vitals and nursing note reviewed.   HENT:      Head: Normocephalic and atraumatic.      Nose: Nose normal.      Mouth/Throat:      Mouth: Mucous membranes are moist.   Eyes:      Extraocular Movements: Extraocular movements intact.      Pupils: Pupils are equal, round, and reactive to light.   Pulmonary:      Effort: Pulmonary effort is normal. No respiratory distress.   Musculoskeletal:      Cervical back: Normal range of motion.      Comments: BLE weakness, LLE>RLE  Deconditioned    Skin:     General: Skin is warm.   Neurological:      Mental Status: She is alert. Mental status is at baseline.      Motor: Weakness present.      Gait: Gait abnormal.   Psychiatric:         Mood and Affect: Mood normal.         Behavior: Behavior normal.          NEUROLOGICAL EXAMINATION:     CRANIAL NERVES     CN III, IV, VI    Pupils are equal, round, and reactive to light.      Diagnostic Results: Labs: Reviewed  ECG: Reviewed  CT: Reviewed

## 2024-05-21 NOTE — PROGRESS NOTES
[facility] - Cardiac Medical ICU  Wound Care    Patient Name:  Jessica Floyd   MRN:  [id number]  Date: 2024  Diagnosis: Acute lower GI bleeding    History:     Past Medical History:   Diagnosis Date    Allergic rhinitis     Amblyopia     Carotid stenosis     Coronary atherosclerosis     Outside [facility] 2014: 20% mLAD, 50% mCx, 20%, mRCA    Dyslipidemia     ESBL (extended spectrum beta-lactamase) producing bacteria infection     UTI    Hypertension     Nausea and vomiting 2017    Pulmonary emphysema 10/28/2023    SAH (subarachnoid hemorrhage) 2016, s/p clipping    Subarachnoid hemorrhage due to ruptured aneurysm        Social History     Socioeconomic History    Marital status:    Occupational History    Occupation: Retired   Tobacco Use    Smoking status: Former     Current packs/day: 0.00     Average packs/day: 0.5 packs/day for 20.0 years (10.0 ttl pk-yrs)     Types: Cigarettes     Start date: 1979     Quit date: 1999     Years since quittin.4     Passive exposure: Past    Smokeless tobacco: Never   Substance and Sexual Activity    Alcohol use: Yes     Alcohol/week: 7.0 standard drinks of alcohol     Types: 7 Glasses of wine per week     Comment: One glass of Red wine prior to dinner    Drug use: No    Sexual activity: Yes     Partners: Male   Social History Narrative    .  Has 3 grown daughters.       Social Determinants of Health     Financial Resource Strain: Low Risk  (2024)    Overall Financial Resource Strain (CARDIA)     Difficulty of Paying Living Expenses: Not hard at all   Food Insecurity: No Food Insecurity (2024)    Hunger Vital Sign     Worried About Running Out of Food in the Last Year: Never true     Ran Out of Food in the Last Year: Never true   Transportation Needs: No Transportation Needs (2024)    TRANSPORTATION NEEDS     Transportation : No   Physical Activity: Inactive (2024)    Exercise Vital Sign     Days of Exercise per  Week: 0 days     Minutes of Exercise per Session: 0 min   Stress: Stress Concern Present (5/17/2024)    [employer] [place] [employer] - Occupational Stress Questionnaire     Feeling of Stress : Rather much   Housing Stability: Low Risk  (5/17/2024)    Housing Stability Vital Sign     Unable to Pay for Housing in the Last Year: No     Homeless in the Last Year: No       Precautions:     Allergies as of 05/14/2024    (No Known Allergies)       [facility] Assessment Details/Treatment   Patient seen for wound care follow-up.   Reviewed chart for this encounter.   See Flow Sheet for findings.     Pt in bed and agreeable to care. Pt positioned to left side with assistance for assessment. Partial thickness skin loss to the sacrum with a pink and moist wound bed. The periwound is intact, pink and dry. Pt's family stated the wound is from an outside facility. Small maroon area slow to klaus. Triad applied to the sacrum and buttocks. The right ear is intact, pink and blanchable, left open to air. Pt and pt's family educated on triad use and the importance of turning every 2 hours.      RECOMMENDATIONS:  - Sacrum and buttocks: Bedside nursing to cleanse with soap and water, pat dry and apply triad BID and PRN; no diapers nor dressings   - Turning every 2 hours  - Bedside nursing to maintain pressure injury prevention interventions     05/21/24 1043        Wound 05/15/24 0020 Moisture associated dermatitis Sacral spine #1   Date First Assessed/Time First Assessed: 05/15/24 0020   Present on Original Admission: Yes  Primary Wound Type: Moisture associated dermatitis  Location: Sacral spine  Wound Number: #1   Wound Image    Dressing Appearance Open to air   Drainage Amount None   Appearance Pink;Moist   Tissue loss description Partial thickness   Periwound Area Intact;Dry;Pink  (blanchable)   Wound Edges Open   Care Cleansed with:;Soap and water   Dressing Applied;Other (comment)  (triad)   Periwound Care Moisture barrier  applied  (triad)        Wound 05/18/24 1128 Medical adhesive related skin injury Right Ear   Date First Assessed/Time First Assessed: 05/18/24 1128   Present on Original Admission: No  Primary Wound Type: Medical adhesive related skin injury  Side: Right  Location: Ear  Is this injury device related?: Yes   Wound Image    Dressing Appearance Open to air   Drainage Amount None   Appearance Intact;Pink;Dry;Tan  (blanchable)   Periwound Area Intact;Pink;Dry  (blanchable)     Recommendations made to primary team for above plan via secure chat. Wound care will follow-up as needed.   05/21/2024

## 2024-05-22 PROBLEM — T14.8XXA ABRASION: Status: ACTIVE | Noted: 2024-05-22

## 2024-05-22 LAB
ALBUMIN SERPL BCP-MCNC: 1.7 G/DL (ref 3.5–5.2)
ALP SERPL-CCNC: 71 U/L (ref 55–135)
ALT SERPL W/O P-5'-P-CCNC: 53 U/L (ref 10–44)
ANION GAP SERPL CALC-SCNC: 9 MMOL/L (ref 8–16)
ANISOCYTOSIS BLD QL SMEAR: SLIGHT
ANISOCYTOSIS BLD QL SMEAR: SLIGHT
AST SERPL-CCNC: 84 U/L (ref 10–40)
BACTERIA BLD CULT: NORMAL
BASO STIPL BLD QL SMEAR: ABNORMAL
BASOPHILS # BLD AUTO: 0.03 K/UL (ref 0–0.2)
BASOPHILS # BLD AUTO: ABNORMAL K/UL (ref 0–0.2)
BASOPHILS NFR BLD: 0 % (ref 0–1.9)
BASOPHILS NFR BLD: 0.3 % (ref 0–1.9)
BILIRUB SERPL-MCNC: 0.6 MG/DL (ref 0.1–1)
BUN SERPL-MCNC: 9 MG/DL (ref 8–23)
BURR CELLS BLD QL SMEAR: ABNORMAL
BURR CELLS BLD QL SMEAR: ABNORMAL
CALCIUM SERPL-MCNC: 7.5 MG/DL (ref 8.7–10.5)
CHLORIDE SERPL-SCNC: 108 MMOL/L (ref 95–110)
CO2 SERPL-SCNC: 24 MMOL/L (ref 23–29)
CREAT SERPL-MCNC: 0.7 MG/DL (ref 0.5–1.4)
DIFFERENTIAL METHOD BLD: ABNORMAL
DIFFERENTIAL METHOD BLD: ABNORMAL
DOHLE BOD BLD QL SMEAR: PRESENT
EOSINOPHIL # BLD AUTO: 0.6 K/UL (ref 0–0.5)
EOSINOPHIL # BLD AUTO: ABNORMAL K/UL (ref 0–0.5)
EOSINOPHIL NFR BLD: 3 % (ref 0–8)
EOSINOPHIL NFR BLD: 5.7 % (ref 0–8)
ERYTHROCYTE [DISTWIDTH] IN BLOOD BY AUTOMATED COUNT: 17.5 % (ref 11.5–14.5)
ERYTHROCYTE [DISTWIDTH] IN BLOOD BY AUTOMATED COUNT: 17.9 % (ref 11.5–14.5)
EST. GFR  (NO RACE VARIABLE): >60 ML/MIN/1.73 M^2
GIANT PLATELETS BLD QL SMEAR: PRESENT
GLUCOSE SERPL-MCNC: 85 MG/DL (ref 70–110)
HCT VFR BLD AUTO: 29.2 % (ref 37–48.5)
HCT VFR BLD AUTO: 32.9 % (ref 37–48.5)
HGB BLD-MCNC: 10.5 G/DL (ref 12–16)
HGB BLD-MCNC: 9.3 G/DL (ref 12–16)
HYPOCHROMIA BLD QL SMEAR: ABNORMAL
HYPOCHROMIA BLD QL SMEAR: ABNORMAL
IMM GRANULOCYTES # BLD AUTO: 0.25 K/UL (ref 0–0.04)
IMM GRANULOCYTES # BLD AUTO: ABNORMAL K/UL (ref 0–0.04)
IMM GRANULOCYTES NFR BLD AUTO: 2.4 % (ref 0–0.5)
IMM GRANULOCYTES NFR BLD AUTO: ABNORMAL % (ref 0–0.5)
INR PPP: 1 (ref 0.8–1.2)
LYMPHOCYTES # BLD AUTO: 1.2 K/UL (ref 1–4.8)
LYMPHOCYTES # BLD AUTO: ABNORMAL K/UL (ref 1–4.8)
LYMPHOCYTES NFR BLD: 11.9 % (ref 18–48)
LYMPHOCYTES NFR BLD: 8 % (ref 18–48)
MAGNESIUM SERPL-MCNC: 1.9 MG/DL (ref 1.6–2.6)
MCH RBC QN AUTO: 30.1 PG (ref 27–31)
MCH RBC QN AUTO: 30.2 PG (ref 27–31)
MCHC RBC AUTO-ENTMCNC: 31.8 G/DL (ref 32–36)
MCHC RBC AUTO-ENTMCNC: 31.9 G/DL (ref 32–36)
MCV RBC AUTO: 94 FL (ref 82–98)
MCV RBC AUTO: 95 FL (ref 82–98)
MONOCYTES # BLD AUTO: 1.8 K/UL (ref 0.3–1)
MONOCYTES # BLD AUTO: ABNORMAL K/UL (ref 0.3–1)
MONOCYTES NFR BLD: 17 % (ref 4–15)
MONOCYTES NFR BLD: 6 % (ref 4–15)
NEUTROPHILS # BLD AUTO: 6.5 K/UL (ref 1.8–7.7)
NEUTROPHILS NFR BLD: 62.7 % (ref 38–73)
NEUTROPHILS NFR BLD: 81 % (ref 38–73)
NEUTS BAND NFR BLD MANUAL: 2 %
NRBC BLD-RTO: 0 /100 WBC
NRBC BLD-RTO: 0 /100 WBC
OVALOCYTES BLD QL SMEAR: ABNORMAL
OVALOCYTES BLD QL SMEAR: ABNORMAL
PHOSPHATE SERPL-MCNC: 2.9 MG/DL (ref 2.7–4.5)
PLATELET # BLD AUTO: 208 K/UL (ref 150–450)
PLATELET # BLD AUTO: 280 K/UL (ref 150–450)
PLATELET BLD QL SMEAR: ABNORMAL
PMV BLD AUTO: 10 FL (ref 9.2–12.9)
PMV BLD AUTO: 10.4 FL (ref 9.2–12.9)
POIKILOCYTOSIS BLD QL SMEAR: ABNORMAL
POIKILOCYTOSIS BLD QL SMEAR: SLIGHT
POLYCHROMASIA BLD QL SMEAR: ABNORMAL
POLYCHROMASIA BLD QL SMEAR: ABNORMAL
POTASSIUM SERPL-SCNC: 3.5 MMOL/L (ref 3.5–5.1)
PROT SERPL-MCNC: 5.1 G/DL (ref 6–8.4)
PROTHROMBIN TIME: 10.7 SEC (ref 9–12.5)
RBC # BLD AUTO: 3.08 M/UL (ref 4–5.4)
RBC # BLD AUTO: 3.49 M/UL (ref 4–5.4)
SCHISTOCYTES BLD QL SMEAR: ABNORMAL
SCHISTOCYTES BLD QL SMEAR: PRESENT
SODIUM SERPL-SCNC: 141 MMOL/L (ref 136–145)
SPHEROCYTES BLD QL SMEAR: ABNORMAL
SPHEROCYTES BLD QL SMEAR: ABNORMAL
TARGETS BLD QL SMEAR: ABNORMAL
TOXIC GRANULES BLD QL SMEAR: PRESENT
WBC # BLD AUTO: 10.34 K/UL (ref 3.9–12.7)
WBC # BLD AUTO: 13.04 K/UL (ref 3.9–12.7)

## 2024-05-22 PROCEDURE — 80053 COMPREHEN METABOLIC PANEL: CPT | Mod: NTX

## 2024-05-22 PROCEDURE — 97535 SELF CARE MNGMENT TRAINING: CPT | Mod: NTX

## 2024-05-22 PROCEDURE — 20600001 HC STEP DOWN PRIVATE ROOM: Mod: NTX

## 2024-05-22 PROCEDURE — 27000221 HC OXYGEN, UP TO 24 HOURS: Mod: NTX

## 2024-05-22 PROCEDURE — 94660 CPAP INITIATION&MGMT: CPT | Mod: NTX

## 2024-05-22 PROCEDURE — 25000003 PHARM REV CODE 250: Mod: NTX

## 2024-05-22 PROCEDURE — 94640 AIRWAY INHALATION TREATMENT: CPT | Mod: NTX

## 2024-05-22 PROCEDURE — 99222 1ST HOSP IP/OBS MODERATE 55: CPT | Mod: NTX,,, | Performed by: NURSE PRACTITIONER

## 2024-05-22 PROCEDURE — 99233 SBSQ HOSP IP/OBS HIGH 50: CPT | Mod: GC,NTX,, | Performed by: INTERNAL MEDICINE

## 2024-05-22 PROCEDURE — 83735 ASSAY OF MAGNESIUM: CPT | Mod: NTX

## 2024-05-22 PROCEDURE — 36415 COLL VENOUS BLD VENIPUNCTURE: CPT | Mod: NTX

## 2024-05-22 PROCEDURE — 85007 BL SMEAR W/DIFF WBC COUNT: CPT | Mod: NTX

## 2024-05-22 PROCEDURE — 25000242 PHARM REV CODE 250 ALT 637 W/ HCPCS: Mod: NTX

## 2024-05-22 PROCEDURE — 85610 PROTHROMBIN TIME: CPT | Mod: NTX

## 2024-05-22 PROCEDURE — 99900035 HC TECH TIME PER 15 MIN (STAT): Mod: NTX

## 2024-05-22 PROCEDURE — 84100 ASSAY OF PHOSPHORUS: CPT | Mod: NTX

## 2024-05-22 PROCEDURE — 85027 COMPLETE CBC AUTOMATED: CPT | Mod: NTX

## 2024-05-22 PROCEDURE — 97116 GAIT TRAINING THERAPY: CPT | Mod: NTX

## 2024-05-22 PROCEDURE — 63600175 PHARM REV CODE 636 W HCPCS: Mod: NTX

## 2024-05-22 PROCEDURE — 99024 POSTOP FOLLOW-UP VISIT: CPT | Mod: NTX,,, | Performed by: STUDENT IN AN ORGANIZED HEALTH CARE EDUCATION/TRAINING PROGRAM

## 2024-05-22 PROCEDURE — 94761 N-INVAS EAR/PLS OXIMETRY MLT: CPT | Mod: NTX

## 2024-05-22 PROCEDURE — 85025 COMPLETE CBC W/AUTO DIFF WBC: CPT | Mod: NTX

## 2024-05-22 PROCEDURE — 99232 SBSQ HOSP IP/OBS MODERATE 35: CPT | Mod: NTX,,, | Performed by: NURSE PRACTITIONER

## 2024-05-22 PROCEDURE — 97530 THERAPEUTIC ACTIVITIES: CPT | Mod: NTX

## 2024-05-22 RX ORDER — LIDOCAINE 40 MG/G
CREAM TOPICAL 2 TIMES DAILY PRN
Status: DISCONTINUED | OUTPATIENT
Start: 2024-05-22 | End: 2024-06-03 | Stop reason: HOSPADM

## 2024-05-22 RX ORDER — ALUMINUM HYDROXIDE, MAGNESIUM HYDROXIDE, AND SIMETHICONE 2400; 240; 2400 MG/30ML; MG/30ML; MG/30ML
30 SUSPENSION ORAL EVERY 6 HOURS PRN
Status: DISCONTINUED | OUTPATIENT
Start: 2024-05-23 | End: 2024-06-03 | Stop reason: HOSPADM

## 2024-05-22 RX ORDER — TALC
6 POWDER (GRAM) TOPICAL NIGHTLY PRN
Status: DISCONTINUED | OUTPATIENT
Start: 2024-05-22 | End: 2024-05-24

## 2024-05-22 RX ORDER — AMLODIPINE BESYLATE 5 MG/1
5 TABLET ORAL DAILY
Status: DISCONTINUED | OUTPATIENT
Start: 2024-05-22 | End: 2024-05-23

## 2024-05-22 RX ORDER — SIMETHICONE 80 MG
1 TABLET,CHEWABLE ORAL 3 TIMES DAILY PRN
Status: DISCONTINUED | OUTPATIENT
Start: 2024-05-22 | End: 2024-05-24

## 2024-05-22 RX ORDER — SODIUM,POTASSIUM PHOSPHATES 280-250MG
2 POWDER IN PACKET (EA) ORAL ONCE
Status: COMPLETED | OUTPATIENT
Start: 2024-05-22 | End: 2024-05-22

## 2024-05-22 RX ORDER — MAGNESIUM SULFATE HEPTAHYDRATE 40 MG/ML
2 INJECTION, SOLUTION INTRAVENOUS ONCE
Status: COMPLETED | OUTPATIENT
Start: 2024-05-22 | End: 2024-05-22

## 2024-05-22 RX ADMIN — IPRATROPIUM BROMIDE AND ALBUTEROL SULFATE 3 ML: 2.5; .5 SOLUTION RESPIRATORY (INHALATION) at 08:05

## 2024-05-22 RX ADMIN — MAGNESIUM SULFATE HEPTAHYDRATE 2 G: 40 INJECTION, SOLUTION INTRAVENOUS at 11:05

## 2024-05-22 RX ADMIN — LIDOCAINE 1 PATCH: 700 PATCH TOPICAL at 01:05

## 2024-05-22 RX ADMIN — POTASSIUM BICARBONATE 40 MEQ: 391 TABLET, EFFERVESCENT ORAL at 08:05

## 2024-05-22 RX ADMIN — LIDOCAINE 1 PATCH: 700 PATCH TOPICAL at 02:05

## 2024-05-22 RX ADMIN — LIDOCAINE: 40 CREAM TOPICAL at 04:05

## 2024-05-22 RX ADMIN — Medication 6 MG: at 08:05

## 2024-05-22 RX ADMIN — SIMETHICONE 80 MG: 80 TABLET, CHEWABLE ORAL at 04:05

## 2024-05-22 RX ADMIN — POTASSIUM & SODIUM PHOSPHATES POWDER PACK 280-160-250 MG 2 PACKET: 280-160-250 PACK at 11:05

## 2024-05-22 RX ADMIN — QUETIAPINE FUMARATE 50 MG: 25 TABLET ORAL at 08:05

## 2024-05-22 RX ADMIN — AMLODIPINE BESYLATE 5 MG: 5 TABLET ORAL at 11:05

## 2024-05-22 RX ADMIN — ACETAMINOPHEN 650 MG: 325 TABLET ORAL at 04:05

## 2024-05-22 RX ADMIN — PANTOPRAZOLE SODIUM 40 MG: 40 TABLET, DELAYED RELEASE ORAL at 08:05

## 2024-05-22 RX ADMIN — ENOXAPARIN SODIUM 40 MG: 40 INJECTION SUBCUTANEOUS at 04:05

## 2024-05-22 RX ADMIN — IPRATROPIUM BROMIDE AND ALBUTEROL SULFATE 3 ML: 2.5; .5 SOLUTION RESPIRATORY (INHALATION) at 01:05

## 2024-05-22 NOTE — ASSESSMENT & PLAN NOTE
Patient reporting left left pain and weakness associated with pain. Patient has known severe atherosclerosis which may be associated with pain/PAD. Patient has remote history of left hip fracture. Lower extremity ultrasounds and L spine, pelvis, left hip, left femur, left knee, left tib/fib xrays unrevealing.    - PT/OT  - PT recommending rehab, PM&R consulted  - Lidocaine patch/spray PRN  - Neurology consultation at discharge for EMG to rule out nerve damage

## 2024-05-22 NOTE — SUBJECTIVE & OBJECTIVE
Scheduled Meds:   albuterol-ipratropium  3 mL Nebulization Q6H WAKE    amLODIPine  5 mg Oral Daily    cyanocobalamin  1,000 mcg Intramuscular Weekly    enoxparin  40 mg Subcutaneous Q24H (prophylaxis, 1700)    pantoprazole  40 mg Oral Daily    QUEtiapine  50 mg Oral QHS     Continuous Infusions:  PRN Meds:  Current Facility-Administered Medications:     sodium chloride 0.9%, , Intravenous, PRN    acetaminophen, 650 mg, Oral, Q6H PRN    albuterol-ipratropium, 3 mL, Nebulization, Q4H PRN    LIDOcaine, , Topical (Top), BID PRN    melatonin, 6 mg, Oral, Nightly PRN    ondansetron, 4 mg, Intravenous, Q4H PRN    sodium chloride, 1 spray, Each Nostril, PRN    sodium chloride 0.9%, 10 mL, Intravenous, PRN    Review of patient's allergies indicates:  No Known Allergies     Past Medical History:   Diagnosis Date    Allergic rhinitis     Amblyopia     Carotid stenosis     Coronary atherosclerosis     Outside [facility] 6/2014: 20% mLAD, 50% mCx, 20%, mRCA    Dyslipidemia     ESBL (extended spectrum beta-lactamase) producing bacteria infection     UTI    Hypertension     Nausea and vomiting 05/26/2017    Pulmonary emphysema 10/28/2023    SAH (subarachnoid hemorrhage) 08/24/2016 1999, s/p clipping    Subarachnoid hemorrhage due to ruptured aneurysm 1999     Past Surgical History:   Procedure Laterality Date    ADENOIDECTOMY      ANTERIOR CRUCIATE LIGAMENT REPAIR  2012    APPENDECTOMY      CATARACT EXTRACTION W/  INTRAOCULAR LENS IMPLANT Right 11/07/2022    Procedure: EXTRACTION, CATARACT, WITH IOL INSERTION;  Surgeon: Higinio Cristina MD;  Location: [facility] OR;  Service: Ophthalmology;  Laterality: Right;    CATARACT EXTRACTION W/  INTRAOCULAR LENS IMPLANT Left 11/21/2022    Procedure: EXTRACTION, CATARACT, WITH IOL INSERTION;  Surgeon: Higinio Cristina MD;  Location: [facility] OR;  Service: Ophthalmology;  Laterality: Left;    CEREBRAL ANEURYSM REPAIR  1999    clip    COLONOSCOPY N/A 5/16/2024    Procedure: COLONOSCOPY;  Surgeon: Rich Syed  MD JOANN;  Location: [facility] ENDO (2ND FLR);  Service: Endoscopy;  Laterality: N/A;    DENTAL SURGERY      DIAGNOSTIC LAPAROSCOPY N/A 2024    Procedure: LAPAROSCOPY, DIAGNOSTIC;  Surgeon: Ruben Oscar MD;  Location: [facility] OR 2ND FLR;  Service: General;  Laterality: N/A;    ESOPHAGOGASTRODUODENOSCOPY N/A 5/15/2024    Procedure: EGD (ESOPHAGOGASTRODUODENOSCOPY);  Surgeon: Rich Syed MD;  Location: [facility] ENDO (2ND FLR);  Service: Endoscopy;  Laterality: N/A;    EXCISION, SMALL INTESTINE N/A 2024    Procedure: EXCISION, SMALL INTESTINE;  Surgeon: Ruben Oscar MD;  [facility]  Service: General;  Laterality: N/A;    EYE SURGERY Bilateral     cataract removal and lens implants    HIP ARTHROPLASTY Left 2023    Procedure: TOTAL HIP ARTHROPLASTY;  Surgeon: Juan Wan MD;  [facility];  Service: Orthopedics;  Laterality: Left;    LAPAROTOMY, EXPLORATORY N/A 2024    Procedure: LAPAROTOMY, EXPLORATORY;  Surgeon: Ruben Oscar MD;  [facility];  Service: General;  Laterality: N/A;    REPAIR, HERNIA, INGUINAL Bilateral 2024    Procedure: REPAIR, HERNIA, INGUINAL;  Surgeon: Ruben Oscar MD;  [facility];  Service: General;  Laterality: Bilateral;    TONSILLECTOMY         Family History       Problem Relation (Age of Onset)    Alcohol abuse Father    Aneurysm Mother    Gout Brother    Heart disease Brother    Hypertension Mother, Father    Kidney disease Brother    No Known Problems Daughter, Daughter, Daughter          Tobacco Use    Smoking status: Former     Current packs/day: 0.00     Average packs/day: 0.5 packs/day for 20.0 years (10.0 ttl pk-yrs)     Types: Cigarettes     Start date: 1979     Quit date: 1999     Years since quittin.4     Passive exposure: Past    Smokeless tobacco: Never   Substance and Sexual Activity    Alcohol use: Yes     Alcohol/week: 7.0 standard drinks of  alcohol     Types: 7 Glasses of wine per week     Comment: One glass of Red wine prior to dinner    Drug use: No    Sexual activity: Yes     Partners: Male     Review of Systems   Skin:  Positive for wound.       Objective:     Vital Signs (Most Recent):  Temp: 98.3 °F (36.8 °C) (05/22/24 1105)  Pulse: 89 (05/22/24 1342)  Resp: (!) 30 (05/22/24 1315)  BP: (!) 158/78 (05/22/24 1305)  SpO2: 95 % (05/22/24 1315) Vital Signs (24h Range):  Temp:  [97.5 °F (36.4 °C)-98.5 °F (36.9 °C)] 98.3 °F (36.8 °C)  Pulse:  [] 89  Resp:  [14-49] 30  SpO2:  [90 %-99 %] 95 %  BP: (119-160)/(64-86) 158/78     Weight: 52.6 kg (115 lb 15.4 oz)  Body mass index is 20.54 kg/m².     Physical Exam  Constitutional:       Appearance: Normal appearance.   Skin:     General: Skin is warm and dry.      Findings: Lesion present.   Neurological:      Mental Status: She is alert.          Laboratory:  All pertinent labs reviewed within the last 24 hours.    Diagnostic Results:  None

## 2024-05-22 NOTE — ASSESSMENT & PLAN NOTE
In the setting of decreased PO intake for multiple days, 1.5L deficit. Received gentle IVF, now advanced to [facility]D. Will prioritize PO intake. Currently improving.

## 2024-05-22 NOTE — PROGRESS NOTES
"[facility] - [facility] Medical [facility]  Critical Care Medicine  Progress Note    Patient Name: Jessica Floyd  MRN: [id number]  Admission Date: 5/14/2024  Hospital Length of Stay: 8 days  Code Status: Full Code  Attending Provider: Edouard Giles MD  Primary Care Provider: Effie Olmedo MD   Principal Problem: Acute lower GI bleeding    Subjective:     HPI:  Mrs. Floyd is a 73 year old female with PMH notable for COPD on home 2L NC, pulm HTN, R carotid stent on ASA, HFpEF, anemia, and ERIK on infusions who presented to [facility] ED with the melena.  is at bedside and reports that the patient had an episode of diarrhea and lethargy yesterday. She reports that it felt as if "she was going to pass out", so she was using her walker to get around. Patient's  reports that this has never happened before. Patient denies excessive OTC NSAID use. She reports that she drinks 2 wine glasses a day. This morning when the patient woke up she felt very lethargic. Patient went to use the bathroom, and  noted bright red stool in the bowl. Patient reported a pre syncopal episode and felt lightheaded. She admits to weakness, SOB, light-headedness, hematochezia, and pre syncope. Patient denies nausea, vomiting, hematemesis, falls, confusion, chest pain, urinary changes, and fevers. Patient reports taking aspirin daily but no DOAC.     Hemoglobin 5.9 at presentation to ED (baseline ~10). GI and IR consulted given concern for active bleed seen on CTA. In the emergency department she was given 1L IVF and a 80 mg IV protonix bolus. Critical care medicine. consulted for GIB. CTA in the Ed revealed "Acute gastrointestinal bleed at the level of the cecum". IR planning for embolization today. Patient is also receiving 2 pRBC due to acute anemia.     Hospital/ICU Course:  Admitted to ICU for lower GIB s/p embolization with IR 5/14. Colonoscopy performed 5/16 without evidence of active bleed, old blood/clots evacuated. Patient is s/p " 8U pRBCs, 1 FFP, 1 platelet. Course complicated by worsening abdominal pain, general surgery consulted for further evaluation. Patient underwent ex lap 5/17 and found to have necrotic bowel at site of incarcerated hernia. On zosyn for intraabdominal coverage, BC NGTD. Patient oversedated on 5/18 and required BIPAP briefly for CO2 retention. Subsequently placed on precedex for agitation and IV tylenol for pain control. Patient now off precedex and tolerating QHS seroquel. Anemia stable, advanced to regular diet, on 1L NC. Patient reporting left lower extremity pain and weakness during this admission. Lower extremity xrays and ultrasounds negative for acute pathology. Most likely related to PAD however, will need outpatient neuro consultation for EMG to rule out alternative etiology. Stable for stepdown.    Interval History/Significant Events: Ms. Floyd is doing well today. She had another bowel movement, normal in color. She continues to have left lower extremity pain. Workup so far negative. Restarting home HTN meds as tolerated. Anemia stable. Tolerating regular diet. Stable for stepdown.    Review of Systems  Objective:     Vital Signs (Most Recent):  Temp: 98.5 °F (36.9 °C) (05/22/24 0705)  Pulse: 95 (05/22/24 0905)  Resp: (!) 38 (05/22/24 0905)  BP: (!) 158/79 (05/22/24 0805)  SpO2: 96 % (05/22/24 0905) Vital Signs (24h Range):  Temp:  [97.5 °F (36.4 °C)-98.6 °F (37 °C)] 98.5 °F (36.9 °C)  Pulse:  [] 95  Resp:  [14-49] 38  SpO2:  [90 %-99 %] 96 %  BP: (102-160)/(64-84) 158/79   Weight: 52.6 kg (115 lb 15.4 oz)  Body mass index is 20.54 kg/m².      Intake/Output Summary (Last 24 hours) at 5/22/2024 1046  Last data filed at 5/22/2024 0600  Gross per 24 hour   Intake 359.49 ml   Output 400 ml   Net -40.51 ml          Physical Exam  Vitals and nursing note reviewed.   Constitutional:       General: She is not in acute distress.  HENT:      Head: Normocephalic and atraumatic.      Mouth/Throat:      Mouth:  Mucous membranes are moist.   Eyes:      General: No scleral icterus.     Conjunctiva/sclera: Conjunctivae normal.   Cardiovascular:      Rate and Rhythm: Normal rate and regular rhythm.      Heart sounds: Normal heart sounds. No murmur heard.  Pulmonary:      Effort: Pulmonary effort is normal. No respiratory distress.   Abdominal:      General: Abdomen is flat. There is no distension.      Palpations: Abdomen is soft.      Tenderness: There is abdominal tenderness (Mild). There is no guarding or rebound.      Comments: Less distension, surgical wound dressings clean and intact   Musculoskeletal:         General: No tenderness.      Cervical back: Normal range of motion and neck supple.      Right lower leg: No edema.      Left lower leg: No edema.   Skin:     General: Skin is warm and dry.      Coloration: Skin is not jaundiced or pale.   Neurological:      Mental Status: She is alert and oriented to person, place, and time. Mental status is at baseline.   Psychiatric:         Mood and Affect: Mood normal.         Behavior: Behavior normal.            Vents:  Oxygen Concentration (%): 28 (05/21/24 1329)  Lines/Drains/Airways       Peripheral Intravenous Line  Duration                  Peripheral IV - Single Lumen 05/18/24 0715 20 G;1 3/4 in Anterior;Right Forearm 4 days         Peripheral IV - Single Lumen 05/18/24 0900 20 G;1 3/4 in Anterior;Left Upper Arm 4 days                  Significant Labs:    CBC/Anemia Profile:  Recent Labs   Lab 05/21/24  0817 05/21/24  2100 05/22/24  0846   WBC 15.52* 13.62* 10.34   HGB 9.6* 9.5* 9.3*   HCT 30.1* 29.7* 29.2*    233 208   MCV 95 94 95   RDW 17.4* 17.6* 17.5*        Chemistries:  Recent Labs   Lab 05/21/24  0434 05/22/24  0237    141   K 3.5 3.5    108   CO2 25 24   BUN 11 9   CREATININE 0.8 0.7   CALCIUM 7.3* 7.5*   ALBUMIN 1.6* 1.7*   PROT 4.9* 5.1*   BILITOT 0.8 0.6   ALKPHOS 77 71   ALT 61* 53*   * 84*   MG 1.7 1.9   PHOS 2.7 2.9       CMP:    Recent Labs   Lab 05/21/24  0434 05/22/24  0237    141   K 3.5 3.5    108   CO2 25 24   * 85   BUN 11 9   CREATININE 0.8 0.7   CALCIUM 7.3* 7.5*   PROT 4.9* 5.1*   ALBUMIN 1.6* 1.7*   BILITOT 0.8 0.6   ALKPHOS 77 71   * 84*   ALT 61* 53*   ANIONGAP 11 9       Significant Imaging:  I have reviewed all pertinent imaging results/findings within the past 24 hours.    ABG  Recent Labs   Lab 05/20/24  1056   PH 7.490*   PO2 69*   PCO2 26.5*   HCO3 20.2*   BE -3*     Assessment/Plan:     Pulmonary  Pulmonary emphysema  Not on oxygen at home.     -Home inhalers held in the setting of ileus, resume when appropriate   -Duonebs scheduled    Cardiac/Vascular  (HFpEF) heart failure with preserved ejection fraction  Noted in history but last echo reveals normal function. Holding home coreg.    Transthoracic echo (TTE) complete 10/29/2023    Interpretation Summary    Left Ventricle: The left ventricle is normal in size. Normal wall thickness. Normal wall motion. There is normal systolic function with a visually estimated ejection fraction of 60 - 65%. There is normal diastolic function.    Right Ventricle: Mild right ventricular enlargement. Wall thickness is normal. Right ventricle wall motion  is normal. Systolic function is normal.    Aortic Valve: The aortic valve is a unicuspid valve. There is mild aortic valve sclerosis. There is trace aortic regurgitation.    Mitral Valve: There is mild regurgitation.    Pulmonary Artery: There is mild pulmonary hypertension. The estimated pulmonary artery systolic pressure is 40 mmHg.    IVC/SVC: Intermediate venous pressure at 8 mmHg.        Hypertension  Hypertension Medications                    amLODIPine (NORVASC) 5 MG tablet TAKE 1 TABLET BY MOUTH EVERY DAY     carvediloL (COREG) 6.25 MG tablet Take 1 tablet (6.25 mg total) by mouth 2 (two) times daily with meals.     hydroCHLOROthiazide (HYDRODIURIL) 25 MG tablet Take 1 tablet (25 mg total) by mouth once  daily.       -Will restart amlodipine today and restart coreg/HCTZ as blood pressure permits    Bilateral carotid artery stenosis  April 2022     No evidence of hemodynamically significant stenosis is demonstrated in the extracranial carotid arteries.  Patent right carotid stent    Renal/  Hypernatremia  In the setting of decreased PO intake for multiple days, 1.5L deficit. Received gentle IVF, now advanced to CLD. Will prioritize PO intake. Currently improving.    Oncology  Acute blood loss anemia  See acute lower GIB    Leukocytosis  Leukocytosis on admission, initially thought to be acute phase reactant to GIB as patient afebrile.   Bcx2 NGTD    - S/p 5 days IV Zosyn to cover for intraabdominal infection    GI  * Acute lower GI bleeding  73 year old female with multiple medical problems presenting with melena and some bright red blood with associated pre-syncope found to have hgb of 5.9 (from baseline ~10). CTA with acute gastrointestinal bleed at the level of the cecum. Patient had embolization by IR but continued to to have larger volume dark red blood in stool with falling hgb, repeat CTA without active bleed.     S/p 9U pRBCs, 1 FFP, 1 platelet      Colonoscopy performed 5/16 with no evidence of active bleed, old blood and clots evacuated  IR consulted, embolization performed 5/14    -- Regular diet  -- Protonix 40 mg qd  -- Hold all A/C and A/P agents, on only DVT prophylaxis  -- Correct any coagulopathy with platelets and FFP for goal of platelets > 50K and INR <2.0   -- Maintain large bore IV access   -- MAP > 65 goal   -- Maintain type and screen   -- Trend CBCs  -- Stable for stepdown    Incarcerated inguinal hernia  See bowel ischemia    Ischemia, bowel  Pain out of proportion to exam  S/p ex lap 5/17 with incarcerated hernia repair with bowel resection per general surgery  General surgery consulted, following  H/H stable    Ileus  (Resolved)  Noted on CT A/P    Orthopedic  Left leg pain  Patient  reporting left left pain and weakness associated with pain. Patient has known severe atherosclerosis which may be associated with pain/PAD. Patient has remote history of left hip fracture. Lower extremity ultrasounds and L spine, pelvis, left hip, left femur, left knee, left tib/fib xrays unrevealing.    - PT/OT  - PT recommending rehab, PM&R consulted  - Lidocaine patch/spray PRN  - Neurology consultation at discharge for EMG to rule out nerve damage    Other  History Situational (ie Stress Induced) syncope ([facility] admission 12-25-23  History of syncope. Thought to be reflex mediated. Had pre-syncopal episode at home after having diarrhea but before she noticed active bleeding.     Acute hypoxic on chronic hypercapnic respiratory failure  Patient with Hypercapnic and Hypoxic Respiratory failure which is Acute.  she is on home oxygen at 2 LPM. Signs/symptoms of respiratory failure include- respiratory distress. Contributing diagnoses includes - COPD and oversedation.  Labs and images were reviewed. Patient Has recent ABG, which has been reviewed. Will treat underlying causes and adjust management of respiratory failure as follows: Patient required intermittent short term BIPAP, now satting well on 4L NC.    - Duonebs  - Chest physiotherapy  - Acapella  - IS    Impaired mobility  See left leg pain       Critical Care Daily Checklist:    A: Awake: RASS Goal/Actual Goal: RASS Goal: 0-->alert and calm  Actual:     B: Spontaneous Breathing Trial Performed?     C: SAT & SBT Coordinated?  N/A                      D: Delirium: CAM-ICU Overall CAM-ICU: Negative   E: Early Mobility Performed? Yes   F: Feeding Goal:    Status:     Current Diet Order   Procedures    Diet Adult Regular (IDDSI Level 7)      AS: Analgesia/Sedation N/A   T: Thromboembolic Prophylaxis enoxaparin   H: HOB > 300 Yes   U: Stress Ulcer Prophylaxis (if needed) PPI   G: Glucose Control N/A   B: Bowel Function Stool Occurrence: 1   I: Indwelling Catheter  (Lines & Maldonado) Necessity Maldonado, PIV   D: De-escalation of Antimicrobials/Pharmacotherapies N/A    Plan for the day/ETD Stepdown    Code Status:  Family/Goals of Care: Full Code     Stable for stepdown     Critical care was time spent personally by me on the following activities: development of treatment plan with patient or surrogate and bedside caregivers, discussions with consultants, evaluation of patient's response to treatment, examination of patient, ordering and performing treatments and interventions, ordering and review of laboratory studies, ordering and review of radiographic studies, pulse oximetry, re-evaluation of patient's condition. This critical care time did not overlap with that of any other provider or involve time for any procedures.     Alesia Archer MD  Critical Care Medicine  [facility] - Cardiac Medical ICU

## 2024-05-22 NOTE — ASSESSMENT & PLAN NOTE
Hypertension Medications                    amLODIPine (NORVASC) 5 MG tablet TAKE 1 TABLET BY MOUTH EVERY DAY     carvediloL (COREG) 6.25 MG tablet Take 1 tablet (6.25 mg total) by mouth 2 (two) times daily with meals.     hydroCHLOROthiazide (HYDRODIURIL) 25 MG tablet Take 1 tablet (25 mg total) by mouth once daily.       -Will restart amlodipine today and restart coreg/HCTZ as blood pressure permits

## 2024-05-22 NOTE — ASSESSMENT & PLAN NOTE
Not on oxygen at home.     -Home inhalers held in the setting of ileus, resume when appropriate   -Misha mckenna

## 2024-05-22 NOTE — NURSING
Patient arrived to floor escorted by [facility] nurse. Patient alert and oriented. Cooperative with care. Family present upon transfer. Purwick implemented per patient request. Skin assessment done with RN. Staples in place in abdomin with no signs of infection. Redness to coccyx noted. Small scab to right ear noted. Patient resting comfortably at this time

## 2024-05-22 NOTE — ASSESSMENT & PLAN NOTE
- consult received for evaluation of skin injury.  - pt presented to [facility] ED with the melena.  - small medical adhesive related injury to right ear. Partial thickness tissue loss. Pink and dry.   - leave open to air.  - nursing to maintain pressure injury prevention measures.

## 2024-05-22 NOTE — PROGRESS NOTES
[facility] - Card[facility]  General Surgery  Progress Note    Subjective:     History of Present Illness:  Jessica Floyd is a 73 y.o. female with a history of HTN, HFpEF, CKD3, GERD, and CAD who presented to the ED on 5/14/24 with melena. She is altered on my interview and so the history was collected from chart review and the family. Appears she was feeling light headed and as though she was going to pass out. She also noted hematochezia. Upon arrival she was AF, tachycardic, and hypotensive. CTA was concerning for GIB and so she was taken to IR where she was found to have extravasation from the ileal branch of the SMA. They performed a coil embolization of a tertiary branch. Following this, she was reportedly doing well from a mentation standpoint, but has continued to require blood transfusion. She had an EGD on 5/15 and C-scope on 5/16 neither of which revealed a source for her bleed although GI was not able to reach the cecum due to tortuosity. Over the last couple of days, she has had worsening abdominal pain and she is not able to participate with my interview due to AMS. Her family feels this is pain related although she has been received narcotics and ativan PRN.      She had a low grade fever to 100.6 earlier today. Currently tachycardic to the 100s but HDS. Her CBC is notable for WBC 40 (stable from prior) and H/H 8.6/25.5. her CMP shows a 2 bili of 2.2 but is otherwise unremarkable. Lactic acid is normal at 1.3. She had a repeat CT A/P today which showed dilated bowel consistent with an ileus. However, per her family and the primary team, her pain seems out of proportion to the findings.     Post-Op Info:  Procedure(s) (LRB):  LAPAROSCOPY, DIAGNOSTIC (N/A)  LAPAROTOMY, EXPLORATORY (N/A)  EXCISION, SMALL INTESTINE (N/A)  REPAIR, HERNIA, INGUINAL (Bilateral)   5 Days Post-Op     Interval History: NAEON. Afebrile. HDS. Pain is controlled. Tolerating regular diet without n/v. BM x2, some blood noted. H/H  stable    Medications:  Continuous Infusions:      Scheduled Meds:   albuterol-ipratropium  3 mL Nebulization Q6H WAKE    amLODIPine  5 mg Oral Daily    cyanocobalamin  1,000 mcg Intramuscular Weekly    enoxparin  40 mg Subcutaneous Q24H (prophylaxis, 1700)    magnesium sulfate IVPB  2 g Intravenous Once    pantoprazole  40 mg Oral Daily    QUEtiapine  50 mg Oral QHS     PRN Meds:  Current Facility-Administered Medications:     sodium chloride 0.9%, , Intravenous, PRN    acetaminophen, 650 mg, Oral, Q6H PRN    albuterol-ipratropium, 3 mL, Nebulization, Q4H PRN    LIDOcaine, , Topical (Top), BID PRN    melatonin, 6 mg, Oral, Nightly PRN    ondansetron, 4 mg, Intravenous, Q4H PRN    sodium chloride, 1 spray, Each Nostril, PRN    sodium chloride 0.9%, 10 mL, Intravenous, PRN     Review of patient's allergies indicates:  No Known Allergies  Objective:     Vital Signs (Most Recent):  Temp: 98.3 °F (36.8 °C) (05/22/24 1105)  Pulse: 98 (05/22/24 1105)  Resp: (!) 25 (05/22/24 1105)  BP: 131/81 (05/22/24 1105)  SpO2: 95 % (05/22/24 1105) Vital Signs (24h Range):  Temp:  [97.5 °F (36.4 °C)-98.5 °F (36.9 °C)] 98.3 °F (36.8 °C)  Pulse:  [] 98  Resp:  [14-49] 25  SpO2:  [90 %-99 %] 95 %  BP: (102-160)/(64-84) 131/81     Weight: 52.6 kg (115 lb 15.4 oz)  Body mass index is 20.54 kg/m².    Intake/Output - Last 3 Shifts         05/20 0700 05/21 0659 05/21 0700 05/22 0659 05/22 0700 05/23 0659    P.O. 410 328     I.V. (mL/kg) 56.6 (1.1) 50 (1)     IV Piggyback 276.4 219.6     Total Intake(mL/kg) 743 (14.1) 597.5 (11.4)     Urine (mL/kg/hr) 925 (0.7) 625 (0.5)     Drains       Stool 0 0     Total Output 925 625     Net -182 -27.5            Urine Occurrence  1 x     Stool Occurrence 1 x 3 x              Physical Exam  Vitals and nursing note reviewed.   Constitutional:       General: She is not in acute distress.     Appearance: She is well-developed. She is not diaphoretic.      Interventions: Nasal cannula in place.       Comments: O2 via NC   HENT:      Head: Normocephalic and atraumatic.      Mouth/Throat:      Mouth: Mucous membranes are moist.      Pharynx: Oropharynx is clear. No oropharyngeal exudate.   Eyes:      General: No scleral icterus.     Extraocular Movements: Extraocular movements intact.   Cardiovascular:      Rate and Rhythm: Normal rate.   Pulmonary:      Effort: Pulmonary effort is normal. No respiratory distress.   Abdominal:      General: There is no distension.      Palpations: Abdomen is soft.      Tenderness: There is no abdominal tenderness. There is no guarding or rebound.      Comments: Incision is clean, dry, and intact without drainage, erythema, or increased warmth. Appropriately TTP.   Musculoskeletal:         General: No deformity.   Skin:     General: Skin is warm and dry.      Coloration: Skin is not jaundiced.   Neurological:      General: No focal deficit present.      Mental Status: She is alert and oriented to person, place, and time.      Cranial Nerves: No cranial nerve deficit.          Significant Labs:  I have reviewed all pertinent lab results within the past 24 hours.    Significant Diagnostics:  I have reviewed all pertinent imaging results/findings within the past 24 hours.  Assessment/Plan:     * Acute lower GI bleeding  Jessica Floyd is a 73 y.o. female who presented with a GIB s/p IR embolization of a tertiary branch of the ileal artery now with severe LLQ pain. She is now s/p diagnostic laparotomy converted to ex lap with small bowel resection and primary tissue repair of bilateral inguinal hernias.     - Regular diet  - Aspiration precautions  - Replete lytes PRN   - K>4, Mag>2, Phos>3  - Abx per primary  - Okay for PT/OT. Would recommend early mobilization as tolerated  - Post post instructions as listed below  - Follow up in general surgery clinic in 2 weeks. Message sent to clinic for scheduling. Clinic # is [phone]  - Remainder of care per primary team  - Please contact  general surgery with any questions, concerns, or clinical status changes    Post Op Instructions:  No heavy lifting (>10lbs or a gallon of milk) for 6 weeks from day of surgery.   No pushing or pulling activities such as vacuuming or raking.   Activity as tolerated.   Patient can shower, allow water to run over incisions. No soaking in bath or pools for 2 weeks. Do not pick at surgical glue, it will come off on its own.   You have been prescribed a narcotic pain medication. No driving while taking narcotics and until you can react quickly without pain. Please wean narcotic over the next few days. You may alternate tylenol and ibuprofen for additional pain control.  No straining, avoid constipation. May take OTC stool softeners as described and laxatives as needed.          Case discussed with Dr. Oscar.      ANALISA EdwardsC  General Surgery  [facility] - Cardiac Medical ICU

## 2024-05-22 NOTE — PLAN OF CARE
MICU DAILY GOALS     Family/Goals of care/Code Status   Code Status: Full Code    24H Vital Sign Range  Temp:  [97.5 °F (36.4 °C)-98.5 °F (36.9 °C)]   Pulse:  []   Resp:  [14-49]   BP: (119-160)/(64-86)   SpO2:  [90 %-99 %]      Shift Events (include procedures and significant events)   No acute events throughout shift. Pt transferred to [facility].  as well as daughter at bedside. All meds as well as personal belongings and chart sent with pt. Report given to nurse Neri.    AWAKE RASS: Goal - RASS Goal: 0-->alert and calm  Actual - RASS ([place] Agitation-Sedation Scale): alert and calm    Restraint necessity: Not necessary   BREATHE SBT: Not intubated    Coordinate A & B, analgesics/sedatives Pain: managed   SAT: Not intubated   Delirium CAM-ICU: Overall CAM-ICU: Negative   Early(intubated/ Progressive (non-intubated) Mobility MOVE Screen (INTUBATED ONLY): Not intubated    Activity: Activity Management: Arm raise - L1, Rolling - L1   Feeding/Nutrition Diet order: Diet/Nutrition Received: regular,     Thrombus DVT prophylaxis: VTE Required Core Measure: Pharmacological prophylaxis initiated/maintained   HOB Elevation Head of Bed (HOB) Positioning: HOB at 30-45 degrees   Ulcer Prophylaxis GI: yes   Glucose control N/a      Skin Skin assessed during:     Sacrum intact/not altered?   Heels intact/not altered?   Surgical wound?     CHECK ONE!   (no altered skin or altered skin) and sub boxes:  [] No Altered Skin Integrity Present    []Prevention Measures Documented    [x] Altered Skin Integrity Present or Discovered   [x] LDA present in EPIC, daily doc completed              [] LDA added if not in EPIC (describe wound).                    When describing wound, do not stage, use descriptive words only.    [] Wound Image Taken (required on admit,                   transfer/discharge and every Tuesday)    Wound Care Consulted? Yes    4 EYES:  Attending Nurse (1st set of eyes):     Second RN/Staff Member (2nd set  of eyes):    Bowel Function diarrhea    Indwelling Catheter Necessity [REMOVED]      Urethral Catheter 05/15/24 1600-Reason for Continuing Urinary Catheterization: Critically ill in ICU and requiring hourly monitoring of intake/output       External cath    De-escalation Antibiotics No        VS and assessment per flow sheet, patient progressing towards goals as tolerated, plan of care reviewed with family, all concerns addressed, will continue to monitor.

## 2024-05-22 NOTE — CONSULTS
"[facility]  Skin Integrity PALMER  Consult Note    Patient Name: Jessica Floyd  MRN: [id number]  Admission Date: 5/14/2024  Hospital Length of Stay: 8 days  Attending Physician: Edouard Giles MD  Primary Care Provider: Effie Olmedo MD     Inpatient consult to Skin Integrity  Practitioner  Consult performed by: Lenore Oconnell, NP  Consult ordered by: Lenore Oconnell, PAXTON        Subjective:     History of Present Illness:  Jessica Floyd is a 73 year old female with PMH notable for COPD on home 2L NC, pulm HTN, R carotid stent on ASA, HFpEF, anemia, and ERIK on infusions who presented to [facility] ED with the melena.  is at bedside and reports that the patient had an episode of diarrhea and lethargy yesterday. She reports that it felt as if "she was going to pass out", so she was using her walker to get around. Patient's  reports that this has never happened before. Patient denies excessive OTC NSAID use. She reports that she drinks 2 wine glasses a day. This morning when the patient woke up she felt very lethargic. Patient went to use the bathroom, and  noted bright red stool in the bowl. Patient reported a pre syncopal episode and felt lightheaded. She admits to weakness, SOB, light-headedness, hematochezia, and pre syncope. Patient denies nausea, vomiting, hematemesis, falls, confusion, chest pain, urinary changes, and fevers. Patient reports taking aspirin daily but no DOAC.      Hemoglobin 5.9 at presentation to ED (baseline ~10). GI and IR consulted given concern for active bleed seen on CTA. In the emergency department she was given 1L IVF and a 80 mg IV protonix bolus. Critical care medicine consulted for GIB. CTA in the Ed revealed "Acute gastrointestinal bleed at the level of the cecum". IR planning for embolization today. Patient is also receiving 2 pRBC due to acute anemia. Pt admitted to critical care medicine for further management. Skin integrity PALMER consulted for " evaluation of skin injury.    Scheduled Meds:   albuterol-ipratropium  3 mL Nebulization Q6H WAKE    amLODIPine  5 mg Oral Daily    cyanocobalamin  1,000 mcg Intramuscular Weekly    enoxparin  40 mg Subcutaneous Q24H (prophylaxis, 1700)    pantoprazole  40 mg Oral Daily    QUEtiapine  50 mg Oral QHS     Continuous Infusions:  PRN Meds:  Current Facility-Administered Medications:     sodium chloride 0.9%, , Intravenous, PRN    acetaminophen, 650 mg, Oral, Q6H PRN    albuterol-ipratropium, 3 mL, Nebulization, Q4H PRN    LIDOcaine, , Topical (Top), BID PRN    melatonin, 6 mg, Oral, Nightly PRN    ondansetron, 4 mg, Intravenous, Q4H PRN    sodium chloride, 1 spray, Each Nostril, PRN    sodium chloride 0.9%, 10 mL, Intravenous, PRN    Review of patient's allergies indicates:  No Known Allergies     Past Medical History:   Diagnosis Date    Allergic rhinitis     Amblyopia     Carotid stenosis     Coronary atherosclerosis     Outside [facility] 6/2014: 20% mLAD, 50% mCx, 20%, mRCA    Dyslipidemia     ESBL (extended spectrum beta-lactamase) producing bacteria infection     UTI    Hypertension     Nausea and vomiting 05/26/2017    Pulmonary emphysema 10/28/2023    SAH (subarachnoid hemorrhage) 08/24/2016 1999, s/p clipping    Subarachnoid hemorrhage due to ruptured aneurysm 1999     Past Surgical History:   Procedure Laterality Date    ADENOIDECTOMY      ANTERIOR CRUCIATE LIGAMENT REPAIR  2012    APPENDECTOMY      CATARACT EXTRACTION W/  INTRAOCULAR LENS IMPLANT Right 11/07/2022    Procedure: EXTRACTION, CATARACT, WITH IOL INSERTION;  Surgeon: Higinio Cristina MD;  Location: [facility] [place], operated by [facility] OR;  Service: Ophthalmology;  Laterality: Right;    CATARACT EXTRACTION W/  INTRAOCULAR LENS IMPLANT Left 11/21/2022    Procedure: EXTRACTION, CATARACT, WITH IOL INSERTION;  Surgeon: Higinio Cristina MD;  Location: [facility], [place], operated by [facility] OR;  Service: Ophthalmology;  Laterality: Left;    CEREBRAL ANEURYSM REPAIR  1999    clip    COLONOSCOPY N/A 5/16/2024    Procedure:  COLONOSCOPY;  Surgeon: Rich Syed MD;  Location: [facility] ENDO (2ND FLR);  Service: Endoscopy;  Laterality: N/A;    DENTAL SURGERY      DIAGNOSTIC LAPAROSCOPY N/A 2024    Procedure: LAPAROSCOPY, DIAGNOSTIC;  Surgeon: Ruben Oscar MD;  Location: [facility] OR 2ND FLR;  Service: General;  Laterality: N/A;    ESOPHAGOGASTRODUODENOSCOPY N/A 5/15/2024    Procedure: EGD (ESOPHAGOGASTRODUODENOSCOPY);  Surgeon: Rich Syed MD;  Location: [facility] ENDO (2ND FLR);  Service: Endoscopy;  Laterality: N/A;    EXCISION, SMALL INTESTINE N/A 2024    Procedure: EXCISION, SMALL INTESTINE;  Surgeon: Ruben Oscar MD;  [facility];  Service: General;  Laterality: N/A;    EYE SURGERY Bilateral     cataract removal and lens implants    HIP ARTHROPLASTY Left 2023    Procedure: TOTAL HIP ARTHROPLASTY;  Surgeon: Juan Wan MD;  [facility];  Service: Orthopedics;  Laterality: Left;    LAPAROTOMY, EXPLORATORY N/A 2024    Procedure: LAPAROTOMY, EXPLORATORY;  Surgeon: Ruben Oscar MD;  [facility];  Service: General;  Laterality: N/A;    REPAIR, HERNIA, INGUINAL Bilateral 2024    Procedure: REPAIR, HERNIA, INGUINAL;  Surgeon: Ruben Oscar MD;  [facility];  Service: General;  Laterality: Bilateral;    TONSILLECTOMY         Family History       Problem Relation (Age of Onset)    Alcohol abuse Father    Aneurysm Mother    Gout Brother    Heart disease Brother    Hypertension Mother, Father    Kidney disease Brother    No Known Problems Daughter, Daughter, Daughter          Tobacco Use    Smoking status: Former     Current packs/day: 0.00     Average packs/day: 0.5 packs/day for 20.0 years (10.0 ttl pk-yrs)     Types: Cigarettes     Start date: 1979     Quit date: 1999     Years since quittin.4     Passive exposure: Past    Smokeless tobacco: Never   Substance and Sexual Activity    Alcohol use: Yes      Alcohol/week: 7.0 standard drinks of alcohol     Types: 7 Glasses of wine per week     Comment: One glass of Red wine prior to dinner    Drug use: No    Sexual activity: Yes     Partners: Male     Review of Systems   Skin:  Positive for wound.       Objective:     Vital Signs (Most Recent):  Temp: 98.3 °F (36.8 °C) (05/22/24 1105)  Pulse: 89 (05/22/24 1342)  Resp: (!) 30 (05/22/24 1315)  BP: (!) 158/78 (05/22/24 1305)  SpO2: 95 % (05/22/24 1315) Vital Signs (24h Range):  Temp:  [97.5 °F (36.4 °C)-98.5 °F (36.9 °C)] 98.3 °F (36.8 °C)  Pulse:  [] 89  Resp:  [14-49] 30  SpO2:  [90 %-99 %] 95 %  BP: (119-160)/(64-86) 158/78     Weight: 52.6 kg (115 lb 15.4 oz)  Body mass index is 20.54 kg/m².     Physical Exam  Constitutional:       Appearance: Normal appearance.   Skin:     General: Skin is warm and dry.      Findings: Lesion present.   Neurological:      Mental Status: She is alert.          Laboratory:  All pertinent labs reviewed within the last 24 hours.    Diagnostic Results:  None      Assessment/Plan:         PALMER Skin Integrity Evaluation      Skin Integrity PALMER evaluation of patient as part of the comprehensive skin care team.     She has been admitted for 8 days. Skin injury was noted on 5/18/24. POA no.    R ear        Orthopedic  Abrasion  - consult received for evaluation of skin injury.  - pt presented to [facility] ED with the melena.  - small medical adhesive related injury to right ear. Partial thickness tissue loss. Pink and dry.   - leave open to air.  - nursing to maintain pressure injury prevention measures.      Moisture related injury to sacrum present but pt unable to turn today. Will f/u for sacral assessment at a later date. Continue current orders.     Thank you for your consult. I will follow-up with patient. Please contact us if you have any additional questions.      Lenore Oconnell NP  Skin Integrity PALMER  [facility]

## 2024-05-22 NOTE — SUBJECTIVE & OBJECTIVE
Interval History: NAEON. Afebrile. HDS. Pain is controlled. Tolerating regular diet without n/v. BM x2, some blood noted. H/H stable    Medications:  Continuous Infusions:      Scheduled Meds:   albuterol-ipratropium  3 mL Nebulization Q6H WAKE    amLODIPine  5 mg Oral Daily    cyanocobalamin  1,000 mcg Intramuscular Weekly    enoxparin  40 mg Subcutaneous Q24H (prophylaxis, 1700)    magnesium sulfate IVPB  2 g Intravenous Once    pantoprazole  40 mg Oral Daily    QUEtiapine  50 mg Oral QHS     PRN Meds:  Current Facility-Administered Medications:     sodium chloride 0.9%, , Intravenous, PRN    acetaminophen, 650 mg, Oral, Q6H PRN    albuterol-ipratropium, 3 mL, Nebulization, Q4H PRN    LIDOcaine, , Topical (Top), BID PRN    melatonin, 6 mg, Oral, Nightly PRN    ondansetron, 4 mg, Intravenous, Q4H PRN    sodium chloride, 1 spray, Each Nostril, PRN    sodium chloride 0.9%, 10 mL, Intravenous, PRN     Review of patient's allergies indicates:  No Known Allergies  Objective:     Vital Signs (Most Recent):  Temp: 98.3 °F (36.8 °C) (05/22/24 1105)  Pulse: 98 (05/22/24 1105)  Resp: (!) 25 (05/22/24 1105)  BP: 131/81 (05/22/24 1105)  SpO2: 95 % (05/22/24 1105) Vital Signs (24h Range):  Temp:  [97.5 °F (36.4 °C)-98.5 °F (36.9 °C)] 98.3 °F (36.8 °C)  Pulse:  [] 98  Resp:  [14-49] 25  SpO2:  [90 %-99 %] 95 %  BP: (102-160)/(64-84) 131/81     Weight: 52.6 kg (115 lb 15.4 oz)  Body mass index is 20.54 kg/m².    Intake/Output - Last 3 Shifts         05/20 0700  05/21 0659 05/21 0700  05/22 0659 05/22 0700 05/23 0659    P.O. 410 328     I.V. (mL/kg) 56.6 (1.1) 50 (1)     IV Piggyback 276.4 219.6     Total Intake(mL/kg) 743 (14.1) 597.5 (11.4)     Urine (mL/kg/hr) 925 (0.7) 625 (0.5)     Drains       Stool 0 0     Total Output 925 625     Net -182 -27.5            Urine Occurrence  1 x     Stool Occurrence 1 x 3 x              Physical Exam  Vitals and nursing note reviewed.   Constitutional:       General: She is not in  acute distress.     Appearance: She is well-developed. She is not diaphoretic.      Interventions: Nasal cannula in place.      Comments: O2 via NC   HENT:      Head: Normocephalic and atraumatic.      Mouth/Throat:      Mouth: Mucous membranes are moist.      Pharynx: Oropharynx is clear. No oropharyngeal exudate.   Eyes:      General: No scleral icterus.     Extraocular Movements: Extraocular movements intact.   Cardiovascular:      Rate and Rhythm: Normal rate.   Pulmonary:      Effort: Pulmonary effort is normal. No respiratory distress.   Abdominal:      General: There is no distension.      Palpations: Abdomen is soft.      Tenderness: There is no abdominal tenderness. There is no guarding or rebound.      Comments: Incision is clean, dry, and intact without drainage, erythema, or increased warmth. Appropriately TTP.   Musculoskeletal:         General: No deformity.   Skin:     General: Skin is warm and dry.      Coloration: Skin is not jaundiced.   Neurological:      General: No focal deficit present.      Mental Status: She is alert and oriented to person, place, and time.      Cranial Nerves: No cranial nerve deficit.          Significant Labs:  I have reviewed all pertinent lab results within the past 24 hours.    Significant Diagnostics:  I have reviewed all pertinent imaging results/findings within the past 24 hours.

## 2024-05-22 NOTE — ASSESSMENT & PLAN NOTE
Leukocytosis on admission, initially thought to be acute phase reactant to GIB as patient afebrile.   Bcx2 NGTD    - S/p 5 days IV Zosyn to cover for intraabdominal infection

## 2024-05-22 NOTE — PT/OT/SLP PROGRESS
Physical Therapy   Progress Note    Patient Name:  Jessica Floyd  MRN: [id number]    Admit Date: 5/14/2024  Admitting Diagnosis:  Acute lower GI bleeding  Length of Stay: 8 days  Recent Surgery: Procedure(s) (LRB):  LAPAROSCOPY, DIAGNOSTIC (N/A)  LAPAROTOMY, EXPLORATORY (N/A)  EXCISION, SMALL INTESTINE (N/A)  REPAIR, HERNIA, INGUINAL (Bilateral) 5 Days Post-Op    Recommendations:     Discharge Recommendations: high intensity therapy  Equipment recommendations: none  Barriers to discharge: Increased level of skilled assistance required    Assessment:     Jessica Floyd is a 73 y.o. female admitted to [facility] on 5/14/2024 with medical diagnosis of Acute lower GI bleeding. Pt presents with impaired functional mobility, weakness, impaired endurance, gait instability. Pt is progressing towards goals, but has not yet reached prior level of function.     Pt found in bed with HOB elevated, with  and daughters at bedside. Pt denied any pain. Performed supine to sit with Myah, scooting with CGA, STS and ambulated 7 ft using a RW with CGA. Pt left in chair, call bell in reach, VSS, and nsg notified.     Pt encouraged to perform feeding and self care by herself, and to use the bedside commode with nsg.    Jessica Floyd would benefit from continued acute PT intervention to improve quality of life, focus on recovery of impairments, provide patient/caregiver education, reduce fall risk, and maximize (I) and safety with functional mobility. Once medically stable, recommending pt discharge to high intensity therapy. Patient has demonstrated sufficient progression to warrant high intensity therapy evidenced by objectives noted below.     Rehab Prognosis: Good    Plan:     During this hospitalization, patient to be seen 4 x/week to address the identified rehab impairments via gait training, therapeutic activities, therapeutic exercises, neuromuscular re-education and progress towards stated goals.     Plan of Care Expires:   "06/18/24  Plan of Care reviewed with: patient, spouse, and family    This plan of care has been discussed with the patient/caregiver, who was included in its development and is in agreement with the identified goals and treatment plan.     Subjective     Communicated with RN prior to session.  Patient found HOB elevated upon PT entry to room, agreeable to therapy session. Pt's  and daughter present during session.    Patient/Family Comments/goals: "I'll try my best"    Pain/Comfort:  Pain Rating 1: 0/10    Patients cultural, spiritual, Confucianism conflicts given the current situation: None identified     Objective:   OT present for cotreat due to pt's multiple medical comorbidities and functional/cognition deficits requiring two skilled therapists to appropriately progress pt's musculoskeletal strength, neuromuscular control, and endurance while taking into consideration medical acuity and pt safety.       Patient found with: blood pressure cuff, pulse ox (continuous), telemetry, oxygen, PureWick    General Precautions: Standard, fall   Orthopedic Precautions:N/A   Braces: N/A   Oxygen Device: nasal cannula .5 L    Cognition:  Pt is Alert during session.    Therapist provided skilled verbal and tactile cueing to facilitate the following functional mobility tasks. Listed tasks are focused on recovery of impairments and improving pt's independence and efficiency with bed mobility, transfers and ambulation as able.     Bed Mobility:  Supine > Sit: Minimal Assistance    Transfers:   Sit <> Stand Transfer: Contact Guard Assistance from bed with RW AD                 Gait:  Distance: 7 ft   Assistance level: Contact Guard Assistance  Assistive Device: rolling walker  Gait Assessment: decreased step length , decreased stride length , decreased aliza, decreased gait speed, and narrow base of support    Balance:  Dynamic Sitting: FAIR+: Maintains balance through MINIMAL excursions of active trunk " motion  Standing:  Static: FAIR+: Takes MINIMAL challenges from all directions   Dynamic: FAIR: Needs CONTACT GUARD during gait    Outcome Measure: AM-PAC 6 CLICK MOBILITY  Total Score:15     Patient/Caregiver Education and Additional Therapeutic Activities/Exercises       Provided pt/caregiver education regarding:   PT POC and goals for therapy   Safety with mobility and fall risk   Safe management of AD as needed   Energy conservation techniques   Instruction on use of call button and importance of calling nursing staff for assistance with mobility     Patient/caregiver able to verbalize understanding; will follow-up with pt/caregiver during current admit for additional questions/concerns within scope of practice.     White board updated.     Patient left up in chair with all lines intact, call button in reach, nsg notified, and daughter present.    Goals:     Multidisciplinary Problems       Physical Therapy Goals          Problem: Physical Therapy    Goal Priority Disciplines Outcome Goal Variances Interventions   Physical Therapy Goal     PT, PT/OT Progressing     Description: Goals to be met by: 2024    Patient will increase functional independence with mobility by performin. Supine to sit with Minimal Assistance   2. Sit to stand transfer with Contact Guard Assistance using RW  3. Gait  x 25 feet with Contact Guard Assistance using Rolling Walker.   4. Ascend/descend 3 stair with bilateral Handrails Contact Guard Assistance using RW.   5. Stand for 3 minutes with Contact Guard Assistance using using RW                          Time Tracking:       PT Received On: 24  PT Start Time: 1019     PT Stop Time: 1050  PT Total Time (min): 31 min     Billable Minutes: Gait Training 31    2024

## 2024-05-22 NOTE — HPI
"Jessica Floyd is a 73 year old female with PMH notable for COPD on home 2L NC, pulm HTN, R carotid stent on ASA, HFpEF, anemia, and ERIK on infusions who [facility] with [facility]ena.  is at bedside and reports that the patient had an episode of diarrhea and lethargy yesterday. She reports that it felt as if "she was going to pass out", so she was using her walker to get around. Patient's  reports that this has never happened before. Patient denies excessive OTC NSAID use. She reports that she drinks 2 wine glasses a day. This morning when the patient woke up she felt very lethargic. Patient went to use the bathroom, and  noted bright red stool in the bowl. Patient reported a pre syncopal episode and felt lightheaded. She admits to weakness, SOB, light-headedness, hematochezia, and pre syncope. Patient denies nausea, vomiting, hematemesis, falls, confusion, chest pain, urinary changes, and fevers. Patient reports taking aspirin daily but no DOAC.      Hemoglobin 5.9 at presentation to ED (baseline ~10). GI and IR consulted given concern for active bleed seen on CTA. In the emergency department she was given 1L IVF and a 80 mg IV protonix bolus. Critical care medicine consulted for GIB. CTA in the Ed revealed "Acute gastrointestinal bleed at the level of the cecum". IR planning for embolization today. Patient is also receiving 2 pRBC due to acute anemia. Pt admitted to critical care medicine for further management. Skin integrity PALMER consulted for evaluation of skin injury.  "

## 2024-05-22 NOTE — SUBJECTIVE & OBJECTIVE
Interval History/Significant Events: Ms. Floyd is doing well today. She had another bowel movement, normal in color. She continues to have left lower extremity pain. Workup so far negative. Restarting home HTN meds as tolerated. Anemia stable. Tolerating regular diet. Stable for stepdown.    Review of Systems  Objective:     Vital Signs (Most Recent):  Temp: 98.5 °F (36.9 °C) (05/22/24 0705)  Pulse: 95 (05/22/24 0905)  Resp: (!) 38 (05/22/24 0905)  BP: (!) 158/79 (05/22/24 0805)  SpO2: 96 % (05/22/24 0905) Vital Signs (24h Range):  Temp:  [97.5 °F (36.4 °C)-98.6 °F (37 °C)] 98.5 °F (36.9 °C)  Pulse:  [] 95  Resp:  [14-49] 38  SpO2:  [90 %-99 %] 96 %  BP: (102-160)/(64-84) 158/79   Weight: 52.6 kg (115 lb 15.4 oz)  Body mass index is 20.54 kg/m².      Intake/Output Summary (Last 24 hours) at 5/22/2024 1046  Last data filed at 5/22/2024 0600  Gross per 24 hour   Intake 359.49 ml   Output 400 ml   Net -40.51 ml          Physical Exam  Vitals and nursing note reviewed.   Constitutional:       General: She is not in acute distress.  HENT:      Head: Normocephalic and atraumatic.      Mouth/Throat:      Mouth: Mucous membranes are moist.   Eyes:      General: No scleral icterus.     Conjunctiva/sclera: Conjunctivae normal.   Cardiovascular:      Rate and Rhythm: Normal rate and regular rhythm.      Heart sounds: Normal heart sounds. No murmur heard.  Pulmonary:      Effort: Pulmonary effort is normal. No respiratory distress.   Abdominal:      General: Abdomen is flat. There is no distension.      Palpations: Abdomen is soft.      Tenderness: There is abdominal tenderness (Mild). There is no guarding or rebound.      Comments: Less distension, surgical wound dressings clean and intact   Musculoskeletal:         General: No tenderness.      Cervical back: Normal range of motion and neck supple.      Right lower leg: No edema.      Left lower leg: No edema.   Skin:     General: Skin is warm and dry.      Coloration:  Skin is not jaundiced or pale.   Neurological:      Mental Status: She is alert and oriented to person, place, and time. Mental status is at baseline.   Psychiatric:         Mood and Affect: Mood normal.         Behavior: Behavior normal.            Vents:  Oxygen Concentration (%): 28 (05/21/24 1329)  Lines/Drains/Airways       Peripheral Intravenous Line  Duration                  Peripheral IV - Single Lumen 05/18/24 0715 20 G;1 3/4 in Anterior;Right Forearm 4 days         Peripheral IV - Single Lumen 05/18/24 0900 20 G;1 3/4 in Anterior;Left Upper Arm 4 days                  Significant Labs:    CBC/Anemia Profile:  Recent Labs   Lab 05/21/24  0817 05/21/24  2100 05/22/24  0846   WBC 15.52* 13.62* 10.34   HGB 9.6* 9.5* 9.3*   HCT 30.1* 29.7* 29.2*    233 208   MCV 95 94 95   RDW 17.4* 17.6* 17.5*        Chemistries:  Recent Labs   Lab 05/21/24  0434 05/22/24  0237    141   K 3.5 3.5    108   CO2 25 24   BUN 11 9   CREATININE 0.8 0.7   CALCIUM 7.3* 7.5*   ALBUMIN 1.6* 1.7*   PROT 4.9* 5.1*   BILITOT 0.8 0.6   ALKPHOS 77 71   ALT 61* 53*   * 84*   MG 1.7 1.9   PHOS 2.7 2.9       CMP:   Recent Labs   Lab 05/21/24  0434 05/22/24  0237    141   K 3.5 3.5    108   CO2 25 24   * 85   BUN 11 9   CREATININE 0.8 0.7   CALCIUM 7.3* 7.5*   PROT 4.9* 5.1*   ALBUMIN 1.6* 1.7*   BILITOT 0.8 0.6   ALKPHOS 77 71   * 84*   ALT 61* 53*   ANIONGAP 11 9       Significant Imaging:  I have reviewed all pertinent imaging results/findings within the past 24 hours.

## 2024-05-22 NOTE — NURSING
Nurses Note -- 4 Eyes      5/22/2024   6:44 PM      Skin assessed during: Transfer      [] No Altered Skin Integrity Present    []Prevention Measures Documented      [x] Yes- Altered Skin Integrity Present or Discovered   [] LDA Added if Not in Epic (Describe Wound)   [] New Altered Skin Integrity was Present on Admit and Documented in LDA   [] Wound Image Taken    Wound Care Consulted? No    Attending Nurse:  Nano GONZALEZ    Second RN/Staff Member: Philippe REDDY

## 2024-05-22 NOTE — PLAN OF CARE
Hospital Medicine ICU Acceptance Note    Date of Admit: 5/14/2024  Date of Transfer / Stepdown: 5/22/2024  Matthew, TATO/J, L, Onc (IV chemo w/in 1 month), Gyn/Onc, or other special case?: No  ICU team stepping patient down: MICU  Accepting  team: DAYRON      Hospital/ICU Course:     73 year old female with PMH notable for COPD, pulm HTN, R carotid stent on ASA, HFpEF, anemia, and ERIK on infusions who presented to [facility] ED with melena. Determined to have an active bleed at the level of the cecum s/p embolization with IR. Post operatively she was noted to have increasing abdominal pain and underwent ex lap with general surgery for incarcerated inguinal hernia on 5/17. She has been stable postoperatively. She was requiring intermittent precedex for severe delirium/agitation but has now been off precedex for 2 days and is stable for stepdown. Hgb stable, tolerating regular diet.     Deemed appropriate for transfer to the floor on 5/22/2024, and was accepted to  team DAYRON for further care and management.    Consultants and Procedures:     Consultants:  Consults (From admission, onward)          Status Ordering Provider     Inpatient consult to Skin Integrity  Practitioner  Once        Provider:  (Not yet assigned)    Completed KAR MIGUEL     Inpatient consult to Physical Medicine Rehab  Once        Provider:  (Not yet assigned)    Completed JAKE LEE     Inpatient consult to Midline team  Once        Provider:  (Not yet assigned)    Completed AMARJIT HONG     Inpatient consult to General Surgery  Once        Provider:  (Not yet assigned)    Completed GETACHEW CHERY     Inpatient consult to Critical Care Medicine  Once        Provider:  (Not yet assigned)    Completed FREDRICK BARAHONA     Inpatient consult to Interventional Radiology  Once        Provider:  (Not yet assigned)    Completed FREDRICK BARAHONA     Inpatient consult to Gastroenterology  Once        Provider:  (Not yet assigned)    Completed BROOKLYN  FREDRICK TAVERAS            Procedures:    As documented    Transfer Information:     Diet:  As ordered    Physical Activity:  As tolerated      Pending plan at time of transfer to the floor:  Continue current plan initiated by ICU, will further monitor and adjust as clinically indicated upon arrival to the floor.    Patient has been accepted by Hospital Medicine Team Z, who will assume care of the patient upon arrival to the floor from the ICU. Please contact ICU team with any concerns prior to transfer to the floor.

## 2024-05-22 NOTE — ASSESSMENT & PLAN NOTE
73 year old female with multiple medical problems presenting with melena and some bright red blood with associated pre-syncope found to have hgb of 5.9 (from baseline ~10). CTA with acute gastrointestinal bleed at the level of the cecum. Patient had embolization by IR but continued to to have larger volume dark red blood in stool with falling hgb, repeat CTA without active bleed.     S/p 9U pRBCs, 1 FFP, 1 platelet      Colonoscopy performed 5/16 with no evidence of active bleed, old blood and clots evacuated  IR consulted, embolization performed 5/14    -- Regular diet  -- Protonix 40 mg qd  -- Hold all A/C and A/P agents, on only DVT prophylaxis  -- Correct any coagulopathy with platelets and FFP for goal of platelets > 50K and INR <2.0   -- Maintain large bore IV access   -- MAP > 65 goal   -- Maintain type and screen   -- Trend CBCs  -- Stable for stepdown

## 2024-05-22 NOTE — PLAN OF CARE
Problem: Fall Injury Risk  Goal: Absence of Fall and Fall-Related Injury  Outcome: Progressing     Problem: Skin Injury Risk Increased  Goal: Skin Health and Integrity  Outcome: Progressing     Problem: Infection  Goal: Absence of Infection Signs and Symptoms  Outcome: Progressing   ..  MICU DAILY GOALS     Family/Goals of care/Code Status   Code Status: Full Code    24H Vital Sign Range  Temp:  [97.5 °F (36.4 °C)-98.7 °F (37.1 °C)]   Pulse:  []   Resp:  [14-49]   BP: (102-159)/(64-84)   SpO2:  [90 %-99 %]      Shift Events (include procedures and significant events)   No acute events throughout shift    AWAKE RASS: Goal - RASS Goal: 0-->alert and calm  Actual - RASS ([place]by Agitation-Sedation Scale): alert and calm    Restraint necessity: Not necessary   BREATHE SBT: Not intubated    Coordinate A & B, analgesics/sedatives Pain: managed   SAT: Not intubated   Delirium CAM-ICU: Overall CAM-ICU: Negative   Early(intubated/ Progressive (non-intubated) Mobility MOVE Screen (INTUBATED ONLY): Not intubated    Activity: Activity Management: Rolling - L1   Feeding/Nutrition Diet order: Diet/Nutrition Received: clear liquid,     Thrombus DVT prophylaxis: VTE Required Core Measure: Pharmacological prophylaxis initiated/maintained   HOB Elevation Head of Bed (HOB) Positioning: HOB at 30-45 degrees   Ulcer Prophylaxis GI: no   Glucose control managed     Skin Skin assessed during: Daily Assessment    Sacrum intact/not altered? Yes  Heels intact/not altered? Yes  Surgical wound? No    CHECK ONE!   (no altered skin or altered skin) and sub boxes:  [] No Altered Skin Integrity Present    []Prevention Measures Documented    [] Altered Skin Integrity Present or Discovered   [x] LDA present in EPIC, daily doc completed              [] LDA added if not in EPIC (describe wound).                    When describing wound, do not stage, use descriptive words only.    [] Wound Image Taken (required on admit,                    transfer/discharge and every Tuesday)    Wound Care Consulted? No    4 EYES:  Attending Nurse (1st set of eyes): Matthew Dominguez RN/Staff Member (2nd set of eyes):  Journea   Bowel Function no issues    Indwelling Catheter Necessity [REMOVED]      Urethral Catheter 05/15/24 1600-Reason for Continuing Urinary Catheterization: Critically ill in ICU and requiring hourly monitoring of intake/output          De-escalation Antibiotics No        VS and assessment per flow sheet, patient progressing towards goals as tolerated, plan of care reviewed with [unfilled] and family, all concerns addressed, will continue to monitor.

## 2024-05-22 NOTE — PROGRESS NOTES
[facility] Cardiac Medical ICU  Physical Medicine & Rehab  Progress Note    Patient Name: Jessica Floyd  MRN: [id number]  Admission Date: 5/14/2024  Length of Stay: 8 days  Attending Physician: Edouard Giles MD    Subjective:     Principal Problem:Acute lower GI bleeding    Hospital Course:   5/20/24: Participated w/ PT. Sit <> Stand Transfer: Moderate Assistance from eob with no AD, Bed <> Chair: Total Assistance with no AD. Pt unable to take steps during transfer   5/22/24: Participated w/ OT. Feeding: set-up  G/H seated with SBA; unable to tolerate standing g/h skills due to impaired endurance.   LE dressing; TOTAL A     Interval History 5/22/2024:  Patient is seen for follow-up PM&R evaluation and recommendations: Participated w/ PT & OT today. Pain is controlled. Tolerating regular diet.     HPI, Past Medical, Family, and Social History remains the same as documented in the initial encounter.    Scheduled Medications:    albuterol-ipratropium  3 mL Nebulization Q6H WAKE    amLODIPine  5 mg Oral Daily    cyanocobalamin  1,000 mcg Intramuscular Weekly    enoxparin  40 mg Subcutaneous Q24H (prophylaxis, 1700)    pantoprazole  40 mg Oral Daily    QUEtiapine  50 mg Oral QHS     Diagnostic Results:   Labs: Reviewed  ECG: Reviewed  CT: Reviewed    PRN Medications:   Current Facility-Administered Medications:     sodium chloride 0.9%, , Intravenous, PRN    acetaminophen, 650 mg, Oral, Q6H PRN    albuterol-ipratropium, 3 mL, Nebulization, Q4H PRN    LIDOcaine, , Topical (Top), BID PRN    melatonin, 6 mg, Oral, Nightly PRN    ondansetron, 4 mg, Intravenous, Q4H PRN    simethicone, 1 tablet, Oral, TID PRN    sodium chloride, 1 spray, Each Nostril, PRN    sodium chloride 0.9%, 10 mL, Intravenous, PRN    Review of Systems   Constitutional:  Positive for activity change and fatigue. Negative for fever.   Respiratory:  Negative for cough and shortness of breath.    Musculoskeletal:  Positive for gait problem.    Neurological:  Positive for weakness.   Psychiatric/Behavioral:  Negative for agitation, behavioral problems and confusion.      Objective:     Vital Signs (Most Recent):  Temp: 98.3 °F (36.8 °C) (05/22/24 1105)  Pulse: 98 (05/22/24 1505)  Resp: 20 (05/22/24 1505)  BP: (!) 147/78 (05/22/24 1505)  SpO2: (!) 94 % (05/22/24 1505)    Vital Signs (24h Range):  Temp:  [97.8 °F (36.6 °C)-98.5 °F (36.9 °C)] 98.3 °F (36.8 °C)  Pulse:  [] 98  Resp:  [14-49] 20  SpO2:  [90 %-99 %] 94 %  BP: (119-160)/(64-86) 147/78     Physical Exam  Vitals and nursing note reviewed.   HENT:      Head: Normocephalic and atraumatic.      Nose: Nose normal.      Mouth/Throat:      Mouth: Mucous membranes are moist.   Eyes:      Extraocular Movements: Extraocular movements intact.      Pupils: Pupils are equal, round, and reactive to light.   Pulmonary:      Effort: Pulmonary effort is normal. No respiratory distress.   Musculoskeletal:      Cervical back: Normal range of motion.      Comments: BLE weakness, LLE>RLE  Deconditioned    Skin:     General: Skin is warm.   Neurological:      Mental Status: She is alert. Mental status is at baseline.      Motor: Weakness present.      Gait: Gait abnormal.   Psychiatric:         Mood and Affect: Mood normal.         Behavior: Behavior normal.     Assessment/Plan:      * Acute lower GI bleeding  - S/p embolization with IR 5/14.   - Colonoscopy performed 5/16 without evidence of active bleed, old blood/clots evacuated.   - underwent diagnostic laparotomy converted to ex lap with small bowel resection and primary tissue repair of bilateral inguinal hernias 5/17/24.     Impaired mobility  - Related to prolonged/acute hospital course.     Recommendations  -  Encourage mobility, OOB in chair at least 3 hours per day, and early ambulation as appropriate  -  PT/OT evaluate and treat  -  Pain management  -  Monitor for and prevent skin breakdown and pressure ulcers  Early mobility, repositioning/weight  shifting every 20-30 minutes when sitting, turn patient every 2 hours, proper mattress/overlay and chair cushioning, pressure relief/heel protector boots  -  DVT prophylaxis    -  Reviewed discharge options (IP rehab, SNF, HH therapy, and OP therapy)     PM&R Recommendation:     At this time, the PM&R team has reviewed this patient's ongoing medical case including inpatient diagnosis, medical history, clinical examination, labs, vitals, current social and functional history to provide the post-acute recommendation as follows:     RECOMMENDATIONS: inpatient rehabilitation due to good motivation/participation with therapies, has been determined to tolerate 3 hours of therapy and good potential for recovery.     The patient will be admitted for comprehensive interdisciplinary inpatient rehabilitation to address the impairments due to medical diagnosis of Debility. The patient will benefit from an inpatient rehabilitation program to promote functional recovery, implement compensatory strategies and will undergo assessment for needs for durable medical equipment for safe discharge to the community. This patient will benefit from a coordinated interdisciplinary rehabilitation program services that require close monitoring and treatment with 24-hour rehabilitative nursing and physical/occupational therapies for 3 hours/day for 5 days/week.This interdisciplinary program will be performed under the direction of a physiatrist.    MEDICAL STABILITY:     At this time, no barriers for post-acute rehab admission.    We will continue to follow.     Niyah Lawler NP  Department of Physical Medicine & Rehab   [facility] - Cardiac Medical ICU

## 2024-05-22 NOTE — RESIDENT HANDOFF
Handoff     Primary Team: Networked reference to record PCT  Room Number: 6089/6089 A     Patient Name: Jessica Floyd MRN: [id number]     Date of Birth: [id number] Allergies: Patient has no known allergies.     Age: 73 y.o. Admit Date: 5/14/2024     Sex: female  BMI: Body mass index is 20.54 kg/m².     Code Status: Full Code        Illness Level (current clinical status): Watcher - No    Reason for Admission: Acute lower GI bleeding    Brief HPI (pertinent PMH and diagnosis or differential diagnosis):     73 year old female in the MICU with PMH notable for COPD, pulm HTN, R carotid stent on ASA, HFpEF, anemia, and ERIK on infusions who presented to [facility] ED with melena. Determined to have an active bleed at the level of the cecum s/p embolization with IR. Post operatively she was noted to have increasing abdominal pain and underwent ex lap with general surgery for incarcerated inguinal hernia on 5/17. She has been stable postoperatively. She was requiring intermittent precedex for severe delirium/agitation but has now been off precedex for 2 days and is stable for stepdown. Hgb stable, tolerating regular diet.     Procedure Date: ex-lap 5/17, embolization with IR 5/14    Hospital Course (updated, brief assessment by system or problem, significant events): Admitted to ICU for lower GIB s/p embolization with IR 5/14. Colonoscopy performed 5/16 without evidence of active bleed, old blood/clots evacuated. Patient is s/p 8U pRBCs, 1 FFP, 1 platelet. Course complicated by worsening abdominal pain, general surgery consulted for further evaluation. Patient underwent ex lap 5/17 and found to have necrotic bowel at site of incarcerated hernia. Anemia stable, now tolerating regular diet. On zosyn for intraabdominal coverage, BC NGTD. Patient oversedated on 5/18 and required BIPAP briefly for CO2 retention, now on 1L NC. Subsequently placed on precedex for agitation and IV tylenol for pain control. No longer requiring precedex and  tolerating QHS Seroquel. Patient reported left lower extremity pain and weakness during this admission. Most likely related to PAD as patient has severe atherosclerosis. Lower extremity xrays/ultrasounds negative.    Tasks (specific, using if-then statements): Restarting home blood pressure medications as tolerated. Would recommend neuro consultation outpatient for EMG of left lower extremity. Consider vascular outpatient follow up. However, it is unlikely they will place any arterial stents as patient will not be able to tolerate anticoagulation/DAPT due to GIBs.    Contingency Plan (special circumstances anticipated and plan):  If acute changes in abdominal status would repeat abdominal CT, gen surg still following.     Estimated Discharge Date: 5/25    Discharge Disposition: Rehab Facility    Mentored By: Dr. Giles

## 2024-05-22 NOTE — SUBJECTIVE & OBJECTIVE
Interval History 5/22/2024:  Patient is seen for follow-up PM&R evaluation and recommendations: Participated w/ PT & OT today.     HPI, Past Medical, Family, and Social History remains the same as documented in the initial encounter.    Scheduled Medications:    albuterol-ipratropium  3 mL Nebulization Q6H WAKE    amLODIPine  5 mg Oral Daily    cyanocobalamin  1,000 mcg Intramuscular Weekly    enoxparin  40 mg Subcutaneous Q24H (prophylaxis, 1700)    pantoprazole  40 mg Oral Daily    QUEtiapine  50 mg Oral QHS       Diagnostic Results: Labs: Reviewed  ECG: Reviewed  CT: Reviewed    PRN Medications:   Current Facility-Administered Medications:     sodium chloride 0.9%, , Intravenous, PRN    acetaminophen, 650 mg, Oral, Q6H PRN    albuterol-ipratropium, 3 mL, Nebulization, Q4H PRN    LIDOcaine, , Topical (Top), BID PRN    melatonin, 6 mg, Oral, Nightly PRN    ondansetron, 4 mg, Intravenous, Q4H PRN    simethicone, 1 tablet, Oral, TID PRN    sodium chloride, 1 spray, Each Nostril, PRN    sodium chloride 0.9%, 10 mL, Intravenous, PRN    Review of Systems   Constitutional:  Positive for activity change and fatigue. Negative for fever.   Respiratory:  Negative for cough and shortness of breath.    Musculoskeletal:  Positive for gait problem.   Neurological:  Positive for weakness.   Psychiatric/Behavioral:  Negative for agitation, behavioral problems and confusion.      Objective:     Vital Signs (Most Recent):  Temp: 98.3 °F (36.8 °C) (05/22/24 1105)  Pulse: 98 (05/22/24 1505)  Resp: 20 (05/22/24 1505)  BP: (!) 147/78 (05/22/24 1505)  SpO2: (!) 94 % (05/22/24 1505)    Vital Signs (24h Range):  Temp:  [97.8 °F (36.6 °C)-98.5 °F (36.9 °C)] 98.3 °F (36.8 °C)  Pulse:  [] 98  Resp:  [14-49] 20  SpO2:  [90 %-99 %] 94 %  BP: (119-160)/(64-86) 147/78         Physical Exam  Vitals and nursing note reviewed.   HENT:      Head: Normocephalic and atraumatic.      Nose: Nose normal.      Mouth/Throat:      Mouth: Mucous  membranes are moist.   Eyes:      Extraocular Movements: Extraocular movements intact.      Pupils: Pupils are equal, round, and reactive to light.   Pulmonary:      Effort: Pulmonary effort is normal. No respiratory distress.   Musculoskeletal:      Cervical back: Normal range of motion.      Comments: BLE weakness, LLE>RLE  Deconditioned    Skin:     General: Skin is warm.   Neurological:      Mental Status: She is alert. Mental status is at baseline.      Motor: Weakness present.      Gait: Gait abnormal.   Psychiatric:         Mood and Affect: Mood normal.         Behavior: Behavior normal.          NEUROLOGICAL EXAMINATION:     CRANIAL NERVES     CN III, IV, VI   Pupils are equal, round, and reactive to light.

## 2024-05-22 NOTE — ASSESSMENT & PLAN NOTE
Jessica Floyd is a 73 y.o. female who presented with a GIB s/p IR embolization of a tertiary branch of the ileal artery now with severe LLQ pain. She is now s/p diagnostic laparotomy converted to ex lap with small bowel resection and primary tissue repair of bilateral inguinal hernias.     - Regular diet  - Aspiration precautions  - Replete lytes PRN   - K>4, Mag>2, Phos>3  - Abx per primary  - Okay for PT/OT. Would recommend early mobilization as tolerated  - Post post instructions as listed below  - Follow up in general surgery clinic in 2 weeks. Message sent to clinic for scheduling. Clinic # is [phone]  - Remainder of care per primary team  - Please contact general surgery with any questions, concerns, or clinical status changes    Post Op Instructions:  No heavy lifting (>10lbs or a gallon of milk) for 6 weeks from day of surgery.   No pushing or pulling activities such as vacuuming or raking.   Activity as tolerated.   Patient can shower, allow water to run over incisions. No soaking in bath or pools for 2 weeks. Do not pick at surgical glue, it will come off on its own.   You have been prescribed a narcotic pain medication. No driving while taking narcotics and until you can react quickly without pain. Please wean narcotic over the next few days. You may alternate tylenol and ibuprofen for additional pain control.  No straining, avoid constipation. May take OTC stool softeners as described and laxatives as needed.

## 2024-05-23 ENCOUNTER — PATIENT MESSAGE (OUTPATIENT)
Dept: PRIMARY CARE CLINIC | Facility: CLINIC | Age: 74
End: 2024-05-23
Payer: MEDICARE

## 2024-05-23 PROBLEM — Z51.5 COMFORT MEASURES ONLY STATUS: Status: ACTIVE | Noted: 2024-05-23

## 2024-05-23 LAB
ALBUMIN SERPL BCP-MCNC: 1.9 G/DL (ref 3.5–5.2)
ALP SERPL-CCNC: 75 U/L (ref 55–135)
ALT SERPL W/O P-5'-P-CCNC: 50 U/L (ref 10–44)
ANION GAP SERPL CALC-SCNC: 12 MMOL/L (ref 8–16)
ANISOCYTOSIS BLD QL SMEAR: ABNORMAL
ANISOCYTOSIS BLD QL SMEAR: SLIGHT
ANISOCYTOSIS BLD QL SMEAR: SLIGHT
AST SERPL-CCNC: 64 U/L (ref 10–40)
BASO STIPL BLD QL SMEAR: ABNORMAL
BASOPHILS # BLD AUTO: 0.04 K/UL (ref 0–0.2)
BASOPHILS # BLD AUTO: 0.11 K/UL (ref 0–0.2)
BASOPHILS # BLD AUTO: 0.17 K/UL (ref 0–0.2)
BASOPHILS # BLD AUTO: 0.18 K/UL (ref 0–0.2)
BASOPHILS NFR BLD: 0.2 % (ref 0–1.9)
BASOPHILS NFR BLD: 0.6 % (ref 0–1.9)
BASOPHILS NFR BLD: 0.7 % (ref 0–1.9)
BASOPHILS NFR BLD: 0.7 % (ref 0–1.9)
BILIRUB SERPL-MCNC: 0.6 MG/DL (ref 0.1–1)
BUN SERPL-MCNC: 11 MG/DL (ref 8–23)
BURR CELLS BLD QL SMEAR: ABNORMAL
CALCIUM SERPL-MCNC: 8.3 MG/DL (ref 8.7–10.5)
CHLORIDE SERPL-SCNC: 102 MMOL/L (ref 95–110)
CO2 SERPL-SCNC: 27 MMOL/L (ref 23–29)
CREAT SERPL-MCNC: 0.9 MG/DL (ref 0.5–1.4)
DIFFERENTIAL METHOD BLD: ABNORMAL
DOHLE BOD BLD QL SMEAR: PRESENT
EOSINOPHIL # BLD AUTO: 0 K/UL (ref 0–0.5)
EOSINOPHIL # BLD AUTO: 0.1 K/UL (ref 0–0.5)
EOSINOPHIL # BLD AUTO: 0.1 K/UL (ref 0–0.5)
EOSINOPHIL # BLD AUTO: 0.2 K/UL (ref 0–0.5)
EOSINOPHIL NFR BLD: 0.1 % (ref 0–8)
EOSINOPHIL NFR BLD: 0.2 % (ref 0–8)
EOSINOPHIL NFR BLD: 0.3 % (ref 0–8)
EOSINOPHIL NFR BLD: 1 % (ref 0–8)
ERYTHROCYTE [DISTWIDTH] IN BLOOD BY AUTOMATED COUNT: 17.5 % (ref 11.5–14.5)
ERYTHROCYTE [DISTWIDTH] IN BLOOD BY AUTOMATED COUNT: 17.8 % (ref 11.5–14.5)
ERYTHROCYTE [DISTWIDTH] IN BLOOD BY AUTOMATED COUNT: 17.9 % (ref 11.5–14.5)
ERYTHROCYTE [DISTWIDTH] IN BLOOD BY AUTOMATED COUNT: 18 % (ref 11.5–14.5)
EST. GFR  (NO RACE VARIABLE): >60 ML/MIN/1.73 M^2
GLUCOSE SERPL-MCNC: 101 MG/DL (ref 70–110)
HCT VFR BLD AUTO: 24.4 % (ref 37–48.5)
HCT VFR BLD AUTO: 25.9 % (ref 37–48.5)
HCT VFR BLD AUTO: 30 % (ref 37–48.5)
HCT VFR BLD AUTO: 31.7 % (ref 37–48.5)
HGB BLD-MCNC: 10.1 G/DL (ref 12–16)
HGB BLD-MCNC: 7.5 G/DL (ref 12–16)
HGB BLD-MCNC: 8.2 G/DL (ref 12–16)
HGB BLD-MCNC: 9.1 G/DL (ref 12–16)
HYPOCHROMIA BLD QL SMEAR: ABNORMAL
IMM GRANULOCYTES # BLD AUTO: 0.3 K/UL (ref 0–0.04)
IMM GRANULOCYTES # BLD AUTO: 0.35 K/UL (ref 0–0.04)
IMM GRANULOCYTES # BLD AUTO: 0.59 K/UL (ref 0–0.04)
IMM GRANULOCYTES # BLD AUTO: 0.98 K/UL (ref 0–0.04)
IMM GRANULOCYTES NFR BLD AUTO: 1.8 % (ref 0–0.5)
IMM GRANULOCYTES NFR BLD AUTO: 1.9 % (ref 0–0.5)
IMM GRANULOCYTES NFR BLD AUTO: 2.3 % (ref 0–0.5)
IMM GRANULOCYTES NFR BLD AUTO: 3.8 % (ref 0–0.5)
INR PPP: 1 (ref 0.8–1.2)
LACTATE SERPL-SCNC: 1 MMOL/L (ref 0.5–2.2)
LYMPHOCYTES # BLD AUTO: 0.6 K/UL (ref 1–4.8)
LYMPHOCYTES # BLD AUTO: 0.7 K/UL (ref 1–4.8)
LYMPHOCYTES # BLD AUTO: 0.9 K/UL (ref 1–4.8)
LYMPHOCYTES # BLD AUTO: 1.2 K/UL (ref 1–4.8)
LYMPHOCYTES NFR BLD: 2.6 % (ref 18–48)
LYMPHOCYTES NFR BLD: 3.3 % (ref 18–48)
LYMPHOCYTES NFR BLD: 3.6 % (ref 18–48)
LYMPHOCYTES NFR BLD: 7 % (ref 18–48)
MAGNESIUM SERPL-MCNC: 2.2 MG/DL (ref 1.6–2.6)
MCH RBC QN AUTO: 29.5 PG (ref 27–31)
MCH RBC QN AUTO: 30.2 PG (ref 27–31)
MCH RBC QN AUTO: 30.5 PG (ref 27–31)
MCH RBC QN AUTO: 30.6 PG (ref 27–31)
MCHC RBC AUTO-ENTMCNC: 30.3 G/DL (ref 32–36)
MCHC RBC AUTO-ENTMCNC: 30.7 G/DL (ref 32–36)
MCHC RBC AUTO-ENTMCNC: 31.7 G/DL (ref 32–36)
MCHC RBC AUTO-ENTMCNC: 31.9 G/DL (ref 32–36)
MCV RBC AUTO: 96 FL (ref 82–98)
MCV RBC AUTO: 97 FL (ref 82–98)
MCV RBC AUTO: 97 FL (ref 82–98)
MCV RBC AUTO: 98 FL (ref 82–98)
MONOCYTES # BLD AUTO: 2.7 K/UL (ref 0.3–1)
MONOCYTES # BLD AUTO: 3.2 K/UL (ref 0.3–1)
MONOCYTES # BLD AUTO: 4.3 K/UL (ref 0.3–1)
MONOCYTES # BLD AUTO: 4.6 K/UL (ref 0.3–1)
MONOCYTES NFR BLD: 16 % (ref 4–15)
MONOCYTES NFR BLD: 16.5 % (ref 4–15)
MONOCYTES NFR BLD: 16.8 % (ref 4–15)
MONOCYTES NFR BLD: 17.7 % (ref 4–15)
NEUTROPHILS # BLD AUTO: 12.4 K/UL (ref 1.8–7.7)
NEUTROPHILS # BLD AUTO: 14.5 K/UL (ref 1.8–7.7)
NEUTROPHILS # BLD AUTO: 19.6 K/UL (ref 1.8–7.7)
NEUTROPHILS # BLD AUTO: 19.6 K/UL (ref 1.8–7.7)
NEUTROPHILS NFR BLD: 74 % (ref 38–73)
NEUTROPHILS NFR BLD: 75.6 % (ref 38–73)
NEUTROPHILS NFR BLD: 76.2 % (ref 38–73)
NEUTROPHILS NFR BLD: 77.1 % (ref 38–73)
NRBC BLD-RTO: 0 /100 WBC
OVALOCYTES BLD QL SMEAR: ABNORMAL
PHOSPHATE SERPL-MCNC: 4.3 MG/DL (ref 2.7–4.5)
PLATELET # BLD AUTO: 263 K/UL (ref 150–450)
PLATELET # BLD AUTO: 283 K/UL (ref 150–450)
PLATELET # BLD AUTO: 315 K/UL (ref 150–450)
PLATELET # BLD AUTO: 324 K/UL (ref 150–450)
PLATELET BLD QL SMEAR: ABNORMAL
PLATELET BLD QL SMEAR: ABNORMAL
PMV BLD AUTO: 10.4 FL (ref 9.2–12.9)
PMV BLD AUTO: 10.5 FL (ref 9.2–12.9)
PMV BLD AUTO: 10.6 FL (ref 9.2–12.9)
PMV BLD AUTO: 10.8 FL (ref 9.2–12.9)
POIKILOCYTOSIS BLD QL SMEAR: SLIGHT
POLYCHROMASIA BLD QL SMEAR: ABNORMAL
POLYCHROMASIA BLD QL SMEAR: ABNORMAL
POTASSIUM SERPL-SCNC: 3.7 MMOL/L (ref 3.5–5.1)
PROT SERPL-MCNC: 5.6 G/DL (ref 6–8.4)
PROTHROMBIN TIME: 10.6 SEC (ref 9–12.5)
RBC # BLD AUTO: 2.48 M/UL (ref 4–5.4)
RBC # BLD AUTO: 2.68 M/UL (ref 4–5.4)
RBC # BLD AUTO: 3.08 M/UL (ref 4–5.4)
RBC # BLD AUTO: 3.31 M/UL (ref 4–5.4)
SODIUM SERPL-SCNC: 141 MMOL/L (ref 136–145)
SPHEROCYTES BLD QL SMEAR: ABNORMAL
SPHEROCYTES BLD QL SMEAR: ABNORMAL
TOXIC GRANULES BLD QL SMEAR: PRESENT
WBC # BLD AUTO: 16.77 K/UL (ref 3.9–12.7)
WBC # BLD AUTO: 18.77 K/UL (ref 3.9–12.7)
WBC # BLD AUTO: 25.75 K/UL (ref 3.9–12.7)
WBC # BLD AUTO: 25.87 K/UL (ref 3.9–12.7)

## 2024-05-23 PROCEDURE — 80053 COMPREHEN METABOLIC PANEL: CPT | Mod: NTX

## 2024-05-23 PROCEDURE — 25000003 PHARM REV CODE 250: Mod: NTX | Performed by: INTERNAL MEDICINE

## 2024-05-23 PROCEDURE — 99024 POSTOP FOLLOW-UP VISIT: CPT | Mod: NTX,,, | Performed by: STUDENT IN AN ORGANIZED HEALTH CARE EDUCATION/TRAINING PROGRAM

## 2024-05-23 PROCEDURE — C9113 INJ PANTOPRAZOLE SODIUM, VIA: HCPCS | Mod: NTX | Performed by: INTERNAL MEDICINE

## 2024-05-23 PROCEDURE — 63600175 PHARM REV CODE 636 W HCPCS: Mod: NTX | Performed by: INTERNAL MEDICINE

## 2024-05-23 PROCEDURE — 27000221 HC OXYGEN, UP TO 24 HOURS: Mod: NTX

## 2024-05-23 PROCEDURE — 36415 COLL VENOUS BLD VENIPUNCTURE: CPT | Mod: NTX,XB

## 2024-05-23 PROCEDURE — 63600175 PHARM REV CODE 636 W HCPCS: Mod: NTX | Performed by: PHYSICIAN ASSISTANT

## 2024-05-23 PROCEDURE — 85025 COMPLETE CBC W/AUTO DIFF WBC: CPT | Mod: 91,NTX

## 2024-05-23 PROCEDURE — 36415 COLL VENOUS BLD VENIPUNCTURE: CPT | Mod: NTX | Performed by: INTERNAL MEDICINE

## 2024-05-23 PROCEDURE — 99900035 HC TECH TIME PER 15 MIN (STAT): Mod: NTX

## 2024-05-23 PROCEDURE — 20000000 HC ICU ROOM: Mod: NTX

## 2024-05-23 PROCEDURE — 99233 SBSQ HOSP IP/OBS HIGH 50: CPT | Mod: GC,NTX,, | Performed by: INTERNAL MEDICINE

## 2024-05-23 PROCEDURE — 83605 ASSAY OF LACTIC ACID: CPT | Mod: NTX | Performed by: INTERNAL MEDICINE

## 2024-05-23 PROCEDURE — 25000003 PHARM REV CODE 250: Mod: NTX

## 2024-05-23 PROCEDURE — 85610 PROTHROMBIN TIME: CPT | Mod: NTX

## 2024-05-23 PROCEDURE — 84100 ASSAY OF PHOSPHORUS: CPT | Mod: NTX

## 2024-05-23 PROCEDURE — 94761 N-INVAS EAR/PLS OXIMETRY MLT: CPT | Mod: NTX

## 2024-05-23 PROCEDURE — 25000242 PHARM REV CODE 250 ALT 637 W/ HCPCS: Mod: NTX

## 2024-05-23 PROCEDURE — 85025 COMPLETE CBC W/AUTO DIFF WBC: CPT | Mod: 91,NTX | Performed by: INTERNAL MEDICINE

## 2024-05-23 PROCEDURE — 83735 ASSAY OF MAGNESIUM: CPT | Mod: NTX

## 2024-05-23 PROCEDURE — 94660 CPAP INITIATION&MGMT: CPT | Mod: NTX

## 2024-05-23 PROCEDURE — 63600175 PHARM REV CODE 636 W HCPCS: Mod: NTX

## 2024-05-23 RX ORDER — LORAZEPAM 2 MG/ML
1 INJECTION INTRAMUSCULAR ONCE
Status: COMPLETED | OUTPATIENT
Start: 2024-05-23 | End: 2024-05-23

## 2024-05-23 RX ORDER — LORAZEPAM 2 MG/ML
INJECTION INTRAMUSCULAR
Status: COMPLETED
Start: 2024-05-23 | End: 2024-05-25

## 2024-05-23 RX ORDER — SODIUM CHLORIDE 9 MG/ML
INJECTION, SOLUTION INTRAVENOUS CONTINUOUS
Status: ACTIVE | OUTPATIENT
Start: 2024-05-23 | End: 2024-05-23

## 2024-05-23 RX ORDER — PANTOPRAZOLE SODIUM 40 MG/10ML
40 INJECTION, POWDER, LYOPHILIZED, FOR SOLUTION INTRAVENOUS 2 TIMES DAILY
Status: DISCONTINUED | OUTPATIENT
Start: 2024-05-23 | End: 2024-05-28

## 2024-05-23 RX ADMIN — SODIUM CHLORIDE: 9 INJECTION, SOLUTION INTRAVENOUS at 05:05

## 2024-05-23 RX ADMIN — IPRATROPIUM BROMIDE AND ALBUTEROL SULFATE 3 ML: 2.5; .5 SOLUTION RESPIRATORY (INHALATION) at 09:05

## 2024-05-23 RX ADMIN — LIDOCAINE: 40 CREAM TOPICAL at 08:05

## 2024-05-23 RX ADMIN — PANTOPRAZOLE SODIUM 40 MG: 40 INJECTION, POWDER, FOR SOLUTION INTRAVENOUS at 08:05

## 2024-05-23 RX ADMIN — LORAZEPAM 1 MG: 2 INJECTION INTRAMUSCULAR; INTRAVENOUS at 09:05

## 2024-05-23 RX ADMIN — ONDANSETRON 4 MG: 2 INJECTION INTRAMUSCULAR; INTRAVENOUS at 05:05

## 2024-05-23 RX ADMIN — SODIUM CHLORIDE: 9 INJECTION, SOLUTION INTRAVENOUS at 11:05

## 2024-05-23 RX ADMIN — ALUMINUM HYDROXIDE, MAGNESIUM HYDROXIDE, AND DIMETHICONE 30 ML: 400; 400; 40 SUSPENSION ORAL at 02:05

## 2024-05-23 RX ADMIN — PANTOPRAZOLE SODIUM 40 MG: 40 INJECTION, POWDER, FOR SOLUTION INTRAVENOUS at 09:05

## 2024-05-23 RX ADMIN — SIMETHICONE 80 MG: 80 TABLET, CHEWABLE ORAL at 12:05

## 2024-05-23 RX ADMIN — ONDANSETRON 4 MG: 2 INJECTION INTRAMUSCULAR; INTRAVENOUS at 01:05

## 2024-05-23 NOTE — NURSING
Pt arrived safely to the unit from 14WT, hooked up to bedside monitoring, oriented to unit,  and two daughters at bedside. NGT in place to intermittent wall suction with dark green bilious output. VSS. Pt in NAD.

## 2024-05-23 NOTE — RESPIRATORY THERAPY
"RAPID RESPONSE RESPIRATORY THERAPY PROACTIVE NOTE           Time of visit: 903     Code Status: Full Code   : 1950  Bed: 55439/11884 A:   MRN: [id number]  Time spent at the bedside: < 15 min    SITUATION    Evaluated patient for: Respiratory    BACKGROUND    Why is the patient in the hospital?: Acute lower GI bleeding    Patient has a past medical history of Allergic rhinitis, Amblyopia, Carotid stenosis, Coronary atherosclerosis, Dyslipidemia, ESBL (extended spectrum beta-lactamase) producing bacteria infection, Hypertension, Nausea and vomiting, Pulmonary emphysema, SAH (subarachnoid hemorrhage), and Subarachnoid hemorrhage due to ruptured aneurysm.    24 Hours Vitals Range:  Temp:  [98.3 °F (36.8 °C)-98.7 °F (37.1 °C)]   Pulse:  []   Resp:  [16-43]   BP: (102-159)/(65-87)   SpO2:  [85 %-100 %]     Labs:    Recent Labs     24  0434 24  0237 24  0452    141 141   K 3.5 3.5 3.7    108 102   CO2  24 27   BUN 11 9 11   CREATININE 0.8 0.7 0.9   * 85 101   PHOS 2.7 2.9 4.3   MG 1.7 1.9 2.2        No results for input(s): "PH", "PCO2", "PO2", "HCO3", "POCSATURATED", "BE" in the last 72 hours.    ASSESSMENT/INTERVENTIONS  Before arriving to patient's room, it was said that the patient desated to 70's, but did not have a good wave form on the monitor. When I arrived patient was back on 4L NC SAT's >90%.    Last VS   Temp: 98.7 °F (37.1 °C) ( 1000)  Pulse: 107 ( 1000)  Resp: 25 ( 1000)  BP: 122/65 ( 1000)  SpO2: 85 % ( 1000)    Level of Consciousness: Level of Consciousness (AVPU): alert  Respiratory Effort: Respiratory Effort: Normal, Unlabored Expansion/Accessory Muscle Usage: Expansion/Accessory Muscles/Retractions: expansion symmetric, no retractions, no use of accessory muscles  All Lung Field Breath Sounds: All Lung Fields Breath Sounds: Anterior:, Posterior:, diminished, coarse  BJORN Breath Sounds: diminished  LLL Breath Sounds: coarse  RUL " Breath Sounds: diminished  RML Breath Sounds: coarse  RLL Breath Sounds: coarse  O2 Device/Concentration: 4L N C  NIPPV: No Surgical airway: No  ETCO2 monitored:    Ambu at bedside:      Active Orders   Respiratory Care    ACAPELLA TREATMENT Q6H     Frequency: Q6H     Number of Occurrences: Until Specified    Bipap Nighttime and Daytime Nap Use     Frequency: Nighttime and Daytime Nap Use     Number of Occurrences: Until Specified     Order Questions:      Mode Bilevel      FiO2% 40      Inspiratory pressure: 12 (Comment: whatever you can tolerate)      Expiratory pressure: 5    Chest physiotherapy BID     Frequency: BID     Number of Occurrences: Until Specified     Order Questions:      Indications: LOBAR OR > ATELECTASIS    Incentive spirometry     Frequency: Q4H WAKE     Number of Occurrences: Until Specified    Inhalation Treatment Q6H WAKE     Frequency: Q6H WAKE     Number of Occurrences: Until Specified    Oxygen Continuous     Frequency: Continuous     Number of Occurrences: Until Specified     Order Questions:      Device type: Low flow      Device: Nasal Cannula (1- 5 Liters)      LPM: 2      Titrate O2 per Oxygen Titration Protocol: Yes      Notify MD of: Inability to achieve desired SpO2    Pulse Oximetry Continuous     Frequency: Continuous     Number of Occurrences: Until Specified       RECOMMENDATIONS    We recommend: RRT Recs: Continue POC per primary team.    FOLLOW-UP    Please call back the Rapid Response RTChula RRT at x [phone] for any questions or concerns.

## 2024-05-23 NOTE — ASSESSMENT & PLAN NOTE
Jessica Floyd is a 73 y.o. female who presented with a GIB s/p IR embolization of a tertiary branch of the ileal artery now with severe LLQ pain. She is now s/p diagnostic laparotomy converted to ex lap with small bowel resection and primary tissue repair of bilateral inguinal hernias 5/17. Previously with ROBF and tolerating diet, now with emesis and dilated bowel concerning for ileus     - NPO  - NGT to LIWS  - Convert meds to IV as able. Likely will not absorb PO in setting of ileus   - H/H stable   - Aspiration precautions  - Replete lytes PRN   - K>4, Mag>2, Phos>3  - Abx per primary  - Okay for PT/OT. Would recommend early mobilization as tolerated  - Remainder of care per primary team  - Please contact general surgery with any questions, concerns, or clinical status changes    Post Op Instructions:  Follow up in general surgery clinic in 2 weeks. Message sent to clinic for scheduling. Clinic # is [phone]  No heavy lifting (>10lbs or a gallon of milk) for 6 weeks from day of surgery.   No pushing or pulling activities such as vacuuming or raking.   Activity as tolerated.   Patient can shower, allow water to run over incisions. No soaking in bath or pools for 2 weeks. Do not pick at surgical glue, it will come off on its own.   You have been prescribed a narcotic pain medication. No driving while taking narcotics and until you can react quickly without pain. Please wean narcotic over the next few days. You may alternate tylenol and ibuprofen for additional pain control.  No straining, avoid constipation. May take OTC stool softeners as described and laxatives as needed.

## 2024-05-23 NOTE — ASSESSMENT & PLAN NOTE
Transferred from ICU with a history of GI bleed, seen by GI and General surgery, status post embolization and laparotomy.  Has been stable in ICU.  Overnight had clinical evidence of GI bleed.  Mildly tachycardic, no significant hypotension.  We will do isotonic IV fluids, obtain lactic acid.  Switch to IV Protonix.  GI, General surgery and ICU notified.  We will continue to monitor patient closely.  Stat AXR and CTA abdomen pelvis. Serial CBC.  NPO.  Discontinue Lovenox and antihypertensive

## 2024-05-23 NOTE — ASSESSMENT & PLAN NOTE
Echo done in October of 2023 shows no reports of systolic or diastolic heart failure or significant valvulopathy

## 2024-05-23 NOTE — PROGRESS NOTES
"[facility] to [facility] ([address] Medicine  Progress Note    Patient Name: Jessica Floyd  MRN: [id number]  Patient Class: IP- Inpatient   Admission Date: 5/14/2024  Length of Stay: 9 days  Attending Physician: Danis Davis MD  Primary Care Provider: Effie Olmedo MD        Subjective:     Principal Problem:Acute lower GI bleeding        HPI:  Mrs. Floyd is a 73 year old female with PMH notable for COPD on home 2L NC, pulm HTN, R carotid stent on ASA, HFpEF, anemia, and ERIK on infusions who presented to [facility] [place] ED with the melena.  is at bedside and reports that the patient had an episode of diarrhea and lethargy yesterday. She reports that it felt as if "she was going to pass out", so she was using her walker to get around. Patient's  reports that this has never happened before. Patient denies excessive OTC NSAID use. She reports that she drinks 2 wine glasses a day. This morning when the patient woke up she felt very lethargic. Patient went to use the bathroom, and  noted bright red stool in the bowl. Patient reported a pre syncopal episode and felt lightheaded. She admits to weakness, SOB, light-headedness, hematochezia, and pre syncope. Patient denies nausea, vomiting, hematemesis, falls, confusion, chest pain, urinary changes, and fevers. Patient reports taking aspirin daily but no DOAC.      Hemoglobin 5.9 at presentation to ED (baseline ~10). GI and IR consulted given concern for active bleed seen on CTA. In the emergency department she was given 1L IVF and a 80 mg IV protonix bolus. Critical care medicine. consulted for GIB. CTA in the Ed revealed "Acute gastrointestinal bleed at the level of the cecum". IR planning for embolization today. Patient is also receiving 2 pRBC due to acute anemia.     Overview/Hospital Course:  73 year old female with PMH notable for COPD, pulm HTN, R carotid stent on ASA, HFpEF, anemia, and ERIK on infusions who presented to [facility] [place] ED with melena. " Determined to have an active bleed at the level of the cecum s/p embolization with IR. Post operatively she was noted to have increasing abdominal pain and underwent ex lap with general surgery for incarcerated inguinal hernia on 5/17. She has been stable postoperatively. She was requiring intermittent precedex for severe delirium/agitation but has now been off precedex for 2 days and is stable for stepdown. Hgb stable, tolerating regular diet.      Deemed appropriate for transfer to the floor on 5/22/2024, and was accepted to  team Z for further care and management.      Interval History:  Reports of melena and coffee-ground emesis last night into early this morning.  Patient denies any symptoms of anemia at this time and hemoglobin has been stable.  Reports passing flatus, denies any abdominal pain.  No evidence of hypovolemic shock    Review of Systems   Unable to perform ROS: Other     Objective:     Vital Signs (Most Recent):  Temp: 98.3 °F (36.8 °C) (05/23/24 0445)  Pulse: 94 (05/23/24 0445)  Resp: 18 (05/23/24 0445)  BP: 116/73 (05/23/24 0445)  SpO2: (!) 92 % (05/23/24 0445) Vital Signs (24h Range):  Temp:  [98.3 °F (36.8 °C)-98.7 °F (37.1 °C)] 98.3 °F (36.8 °C)  Pulse:  [] 94  Resp:  [16-43] 18  SpO2:  [91 %-97 %] 92 %  BP: (116-159)/(73-87) 116/73     Weight: 52.6 kg (115 lb 15.4 oz)  Body mass index is 20.54 kg/m².    Intake/Output Summary (Last 24 hours) at 5/23/2024 0825  Last data filed at 5/22/2024 1505  Gross per 24 hour   Intake 45.28 ml   Output --   Net 45.28 ml      Physical Exam  Constitutional:       General: She is not in acute distress.     Appearance: She is ill-appearing.   HENT:      Head: Normocephalic.      Right Ear: External ear normal.      Left Ear: External ear normal.      Nose: Nose normal.      Mouth/Throat:      Mouth: Mucous membranes are dry.   Eyes:      General: No scleral icterus.  Cardiovascular:      Comments: Mild tachycardia, reduce volume pulse  Pulmonary:       Breath sounds: Wheezing (few) present.   Abdominal:      Palpations: Abdomen is soft and distended.      Tenderness: There is minimal abdominal tenderness.      Comments: Staples to abdomen   Decreased bowel sounds  Musculoskeletal:      Right lower leg: No edema.      Left lower leg: No edema.   Skin:     General: Skin is warm.   Neurological:      General: No focal deficit present.      Mental Status: She is alert and oriented to person, place, and time.   Psychiatric:         Mood and Affect: Mood normal.         MELD 3.0: 10 at 5/23/2024  4:52 AM  MELD-Na: 6 at 5/23/2024  4:52 AM  Calculated from:  Serum Creatinine: 0.9 mg/dL (Using min of 1 mg/dL) at 5/23/2024  4:52 AM  Serum Sodium: 141 mmol/L (Using max of 137 mmol/L) at 5/23/2024  4:52 AM  Total Bilirubin: 0.6 mg/dL (Using min of 1 mg/dL) at 5/23/2024  4:52 AM  Serum Albumin: 1.9 g/dL at 5/23/2024  4:52 AM  INR(ratio): 1.0 at 5/23/2024  4:52 AM  Age at listing (hypothetical): 73 years  Sex: Female at 5/23/2024  4:52 AM      Significant Labs:  CBC:  Recent Labs   Lab 05/22/24  0846 05/22/24  1959 05/23/24  0734   WBC 10.34 13.04* 16.77*   HGB 9.3* 10.5* 10.1*   HCT 29.2* 32.9* 31.7*    280 315     CMP:  Recent Labs   Lab 05/22/24 0237 05/23/24  0452    141   K 3.5 3.7    102   CO2 24 27   GLU 85 101   BUN 9 11   CREATININE 0.7 0.9   CALCIUM 7.5* 8.3*   PROT 5.1* 5.6*   ALBUMIN 1.7* 1.9*   BILITOT 0.6 0.6   ALKPHOS 71 75   AST 84* 64*   ALT 53* 50*   ANIONGAP 9 12     PTINR:  Recent Labs   Lab 05/22/24 0237 05/23/24  0452   INR 1.0 1.0       Significant Procedures:   Dobutamine Stress Test with Color Flow: No results found. However, due to the size of the patient record, not all encounters were searched. Please check Results Review for a complete set of results.        Assessment/Plan:      * Acute lower GI bleeding  Transferred from ICU with a history of GI bleed, seen by GI and General surgery, status post embolization and laparotomy.   Has been stable in ICU.  Overnight had clinical evidence of GI bleed.  Mildly tachycardic, no significant hypotension.  We will do isotonic IV fluids, obtain lactic acid.  Switch to IV Protonix.  GI, General surgery and ICU notified.  We will continue to monitor patient closely.  Stat AXR and CTA abdomen pelvis. Serial CBC.  NPO.  Discontinue Lovenox and antihypertensive    Ileus  AXR was done. Patient with distended abdomen with AXR showing dilated bowel, Nurse passing NG at bedside, f/u with Gen sx      (HFpEF) heart failure with preserved ejection fraction  Echo done in October of 2023 shows no reports of systolic or diastolic heart failure or significant valvulopathy      Pulmonary emphysema  On home oxygen.  Continue with nebs p.r.n. and home inhaler    Impaired mobility  PT OT evaluation once patient stabilize      Acute blood loss anemia  See #1    Leukocytosis  Completed course of IV Zosyn      Hypertension  Holding antihypertensive for now given ongoing GI bleed      VTE Risk Mitigation (From admission, onward)           Ordered     Place sequential compression device  Until discontinued         05/23/24 0826     IP VTE HIGH RISK PATIENT  Once         05/15/24 0104     Place sequential compression device  Until discontinued         05/15/24 0104                    Discharge Planning   DEBBIE: 5/24/2024     Code Status: Full Code   Is the patient medically ready for discharge?:     Reason for patient still in hospital (select all that apply): Patient trending condition  Discharge Plan A: Home with family        I spent a total of 60 minutes on this patient          Danis Davis MD  Department of Hospital Medicine   [facility] ([place])

## 2024-05-23 NOTE — PROGRESS NOTES
"[facility] - Cardiac Medical ICU  Critical Care Medicine  Progress Note    Patient Name: Jessica Floyd  MRN: [id number]  Admission Date: 5/14/2024  Hospital Length of Stay: 9 days  Code Status: Full Code  Attending Provider: Edouard Giles MD  Primary Care Provider: Effie Olmedo MD   Principal Problem: Acute lower GI bleeding    Subjective:     HPI:  Mrs. Floyd is a 73 year old female with PMH notable for COPD on home 2L NC, pulm HTN, R carotid stent on ASA, HFpEF, anemia, and ERIK on infusions who presented to [facility] ED with the melena.  is at bedside and reports that the patient had an episode of diarrhea and lethargy yesterday. She reports that it felt as if "she was going to pass out", so she was using her walker to get around. Patient's  reports that this has never happened before. Patient denies excessive OTC NSAID use. She reports that she drinks 2 wine glasses a day. This morning when the patient woke up she felt very lethargic. Patient went to use the bathroom, and  noted bright red stool in the bowl. Patient reported a pre syncopal episode and felt lightheaded. She admits to weakness, SOB, light-headedness, hematochezia, and pre syncope. Patient denies nausea, vomiting, hematemesis, falls, confusion, chest pain, urinary changes, and fevers. Patient reports taking aspirin daily but no DOAC.     Hemoglobin 5.9 at presentation to ED (baseline ~10). GI and IR consulted given concern for active bleed seen on CTA. In the emergency department she was given 1L IVF and a 80 mg IV protonix bolus. Critical care medicine. consulted for GIB. CTA in the Ed revealed "Acute gastrointestinal bleed at the level of the cecum". IR planning for embolization today. Patient is also receiving 2 pRBC due to acute anemia.     Hospital/ICU Course:  Admitted to ICU for lower GIB s/p embolization with IR 5/14. Colonoscopy performed 5/16 without evidence of active bleed, old blood/clots evacuated. Patient is s/p " 8U pRBCs, 1 FFP, 1 platelet. Course complicated by worsening abdominal pain, general surgery consulted for further evaluation. Patient underwent ex lap 5/17 and found to have necrotic bowel at site of incarcerated hernia. On zosyn for intraabdominal coverage, BC NGTD. Patient oversedated on 5/18 and required BIPAP briefly for CO2 retention. Subsequently placed on precedex for agitation and IV tylenol for pain control. Patient now off precedex and tolerating QHS seroquel. Anemia stable, advanced to regular diet, on 1L NC. Patient reporting left lower extremity pain and weakness during this admission. Lower extremity xrays and ultrasounds negative for acute pathology. Most likely related to PAD however, will need outpatient neuro consultation for EMG to rule out alternative etiology.     Patient was stepped down to medicine 5/22 after reporting 2 normal non-bloody bowel movements with negative abdominal exam. On 5/23 the patient reported worsening abdominal pain and distension. She experienced a dark tarry stool over night, as well has worsening reflux. Daughter notes that patient started spitting up small volume green emesis, which progressed over several hours to large volume dark emesis. KUB was obtained converning for ileus vs partial ABO, at which point surgery was notified. NG tube was inserted and placed to low intermittent suction, with likely liters removed since insertion. She also had episode of melenic stool and corresponding drop in Hgb from 10-->7. On examination patient was on 5 L NC and asymptomatic. She did appear dry but extremities felt warm and perfused. She was HDS. MICU consulted for higher level of care.      Review of Systems   Unable to perform ROS: Other   Constitutional:  Positive for fatigue. Negative for chills, diaphoresis and fever.   Eyes:  Negative for visual disturbance.   Respiratory:  Negative for cough and shortness of breath.    Cardiovascular:  Negative for chest pain and leg  swelling.   Gastrointestinal:  Positive for abdominal pain, blood in stool, nausea and vomiting. Negative for constipation and diarrhea.   Neurological:  Positive for weakness (left leg).   Psychiatric/Behavioral:  Negative for confusion.      Objective:     Vital Signs (Most Recent):  Temp: 98.6 °F (37 °C) (05/23/24 1100)  Pulse: (!) 116 (05/23/24 1437)  Resp: (!) 26 (05/23/24 1437)  BP: 130/66 (05/23/24 1300)  SpO2: 96 % (05/23/24 1437) Vital Signs (24h Range):  Temp:  [98.3 °F (36.8 °C)-98.7 °F (37.1 °C)] 98.6 °F (37 °C)  Pulse:  [] 116  Resp:  [16-43] 26  SpO2:  [85 %-100 %] 96 %  BP: (102-158)/(63-87) 130/66   Weight: 52.6 kg (115 lb 15.4 oz)  Body mass index is 20.54 kg/m².      Intake/Output Summary (Last 24 hours) at 5/23/2024 1559  Last data filed at 5/23/2024 0936  Gross per 24 hour   Intake 0 ml   Output 800 ml   Net -800 ml          Physical Exam  Vitals and nursing note reviewed.   Constitutional:       General: She is not in acute distress.     Appearance: She is ill-appearing.   HENT:      Nose: No congestion or rhinorrhea.      Mouth/Throat:      Mouth: Mucous membranes are dry.   Eyes:      General:         Right eye: No discharge.         Left eye: No discharge.   Cardiovascular:      Rate and Rhythm: Regular rhythm. Tachycardia present.      Pulses: Normal pulses.      Heart sounds: No murmur heard.  Pulmonary:      Effort: Pulmonary effort is normal. No respiratory distress.      Breath sounds: Normal breath sounds. No wheezing.   Abdominal:      General: Abdomen is flat. There is no distension.      Palpations: Abdomen is soft.      Tenderness: There is no abdominal tenderness.   Musculoskeletal:      Cervical back: No rigidity.      Right lower leg: Edema (1+) present.      Left lower leg: Edema (1+) present.   Neurological:      General: No focal deficit present.      Mental Status: She is alert and oriented to person, place, and time.            Vents:  Oxygen Concentration (%): 0.5  (05/22/24 1315)  Lines/Drains/Airways       Drain  Duration                  NG/OG Tube 05/23/24 0930 16 Fr. Right nostril <1 day              Peripheral Intravenous Line  Duration                  Peripheral IV - Single Lumen 05/18/24 0715 20 G;1 3/4 in Anterior;Right Forearm 5 days         Peripheral IV - Single Lumen 05/18/24 0900 20 G;1 3/4 in Anterior;Left Upper Arm 5 days                  Significant Labs:    CBC/Anemia Profile:  Recent Labs   Lab 05/22/24  1959 05/23/24  0734 05/23/24  1254   WBC 13.04* 16.77* 18.77*   HGB 10.5* 10.1* 7.5*   HCT 32.9* 31.7* 24.4*    315 263   MCV 94 96 98   RDW 17.9* 17.9* 17.5*        Chemistries:  Recent Labs   Lab 05/22/24  0237 05/23/24  0452    141   K 3.5 3.7    102   CO2 24 27   BUN 9 11   CREATININE 0.7 0.9   CALCIUM 7.5* 8.3*   ALBUMIN 1.7* 1.9*   PROT 5.1* 5.6*   BILITOT 0.6 0.6   ALKPHOS 71 75   ALT 53* 50*   AST 84* 64*   MG 1.9 2.2   PHOS 2.9 4.3       All pertinent labs within the past 24 hours have been reviewed.    Significant Imaging:  I have reviewed all pertinent imaging results/findings within the past 24 hours.    ABG  Recent Labs   Lab 05/20/24  1056   PH 7.490*   PO2 69*   PCO2 26.5*   HCO3 20.2*   BE -3*     Assessment/Plan:     Pulmonary  Pulmonary emphysema  Not on oxygen at home.     -Duonebs prn    Cardiac/Vascular  (HFpEF) heart failure with preserved ejection fraction  Noted in history but last echo reveals normal function. Holding home coreg.    Transthoracic echo (TTE) complete 10/29/2023    Interpretation Summary    Left Ventricle: The left ventricle is normal in size. Normal wall thickness. Normal wall motion. There is normal systolic function with a visually estimated ejection fraction of 60 - 65%. There is normal diastolic function.    Right Ventricle: Mild right ventricular enlargement. Wall thickness is normal. Right ventricle wall motion  is normal. Systolic function is normal.    Aortic Valve: The aortic valve is a  unicuspid valve. There is mild aortic valve sclerosis. There is trace aortic regurgitation.    Mitral Valve: There is mild regurgitation.    Pulmonary Artery: There is mild pulmonary hypertension. The estimated pulmonary artery systolic pressure is 40 mmHg.    IVC/SVC: Intermediate venous pressure at 8 mmHg.        Hypertension  Hypertension Medications                    amLODIPine (NORVASC) 5 MG tablet TAKE 1 TABLET BY MOUTH EVERY DAY     carvediloL (COREG) 6.25 MG tablet Take 1 tablet (6.25 mg total) by mouth 2 (two) times daily with meals.     hydroCHLOROthiazide (HYDRODIURIL) 25 MG tablet Take 1 tablet (25 mg total) by mouth once daily.       -Will restart amlodipine today and restart coreg/HCTZ as blood pressure permits    Bilateral carotid artery stenosis  April 2022     No evidence of hemodynamically significant stenosis is demonstrated in the extracranial carotid arteries.  Patent right carotid stent    Oncology  Acute blood loss anemia  See acute lower GIB    Leukocytosis  Leukocytosis on admission, initially thought to be acute phase reactant to GIB as patient afebrile.   S/p 5 days IV Zosyn to cover for intraabdominal infection    GI  * Acute lower GI bleeding  73 year old female with multiple medical problems presenting with melena and some bright red blood with associated pre-syncope found to have hgb of 5.9 (from baseline ~10). CTA with acute gastrointestinal bleed at the level of the cecum. Patient had embolization by IR but continued to to have larger volume dark red blood in stool with falling hgb, repeat CTA without active bleed.     S/p 9U pRBCs, 1 FFP, 1 platelet    Colonoscopy performed 5/16 with no evidence of active bleed, old blood and clots evacuated  IR consulted, embolization performed 5/14    Patient stepped back up to MICU for higher level of care  Hgb down to 7 from 10  Had episode of melena  Dark NG tube output, likely bilious as opposed to blood    -- diet NPO  -- Protonix 40 mg IV  BID  -- Hold all A/C and A/P agents, on only DVT prophylaxis  -- will reach out to GI for re-evaluation  -- Correct any coagulopathy with platelets and FFP for goal of platelets > 50K and INR <2.0   -- Maintain large bore IV access   -- MAP > 65 goal   -- Maintain type and screen   -- Trend CBCs    Incarcerated inguinal hernia  See bowel ischemia    Ischemia, bowel  S/p ex lap 5/17 with incarcerated hernia repair with bowel resection per general surgery    Ileus  Noted on CT A/P  Previously thought to have resolved  Now with significant emesis  KUB concerning for continued ileus vs pSBO  Surgery away  Now s/p NGT    -NGT to LIWS  -IVF to match output  -NPO    Orthopedic  Left leg pain  Patient reporting left left pain and weakness associated with pain. Patient has known severe atherosclerosis which may be associated with pain/PAD. Patient has remote history of left hip fracture. Lower extremity ultrasounds and L spine, pelvis, left hip, left femur, left knee, left tib/fib xrays unrevealing.    - PT/OT  - PT recommending rehab, PM&R consulted  - Lidocaine patch/spray PRN  - Neurology consultation at discharge for EMG to rule out nerve damage    Other  History Situational (ie Stress Induced) syncope ([facility] admission 12-25-23  History of syncope. Thought to be reflex mediated. Had pre-syncopal episode at home after having diarrhea but before she noticed active bleeding.     Acute hypoxic on chronic hypercapnic respiratory failure  Patient with Hypercapnic and Hypoxic Respiratory failure which is Acute.  she is on home oxygen at 2 LPM. Signs/symptoms of respiratory failure include- respiratory distress. Contributing diagnoses includes - COPD and oversedation.  Labs and images were reviewed. Patient Has recent ABG, which has been reviewed. Will treat underlying causes and adjust management of respiratory failure as follows: Patient required intermittent short term BIPAP, now satting well on 4L NC.    - Duonebs  - Chest  physiotherapy  - Acapella  - IS    Impaired mobility  See left leg pain       Critical Care Daily Checklist:    A: Awake: RASS Goal/Actual Goal: RASS Goal: 0-->alert and calm  Actual:     B: Spontaneous Breathing Trial Performed?     C: SAT & SBT Coordinated?  N/a                      D: Delirium: CAM-ICU Overall CAM-ICU: Negative   E: Early Mobility Performed? yes   F: Feeding Goal:    Status:     Current Diet Order   Procedures    Diet NPO Except for: Ice Chips, Sips with Medication, Medication     Order Specific Question:   Except for     Answer:   Ice Chips     Order Specific Question:   Except for     Answer:   Sips with Medication     Order Specific Question:   Except for     Answer:   Medication      AS: Analgesia/Sedation none   T: Thromboembolic Prophylaxis none   H: HOB > 300 yes   U: Stress Ulcer Prophylaxis (if needed) PPI   G: Glucose Control none   B: Bowel Function Stool Occurrence: 2   I: Indwelling Catheter (Lines & Maldonado) Necessity NG tube, PIVs   D: De-escalation of Antimicrobials/Pharmacotherapies none    Plan for the day/ETD Monitor for bleeding    Code Status:  Family/Goals of Care: Full Code       Critical secondary to Patient has a condition that poses threat to life and bodily function: acute GI bleed      Critical care was time spent personally by me on the following activities: development of treatment plan with patient or surrogate and bedside caregivers, discussions with consultants, evaluation of patient's response to treatment, examination of patient, ordering and performing treatments and interventions, ordering and review of laboratory studies, ordering and review of radiographic studies, pulse oximetry, re-evaluation of patient's condition. This critical care time did not overlap with that of any other provider or involve time for any procedures.     Raghu Lopez MD  Critical Care Medicine  [facility] - Cardiac Medical ICU

## 2024-05-23 NOTE — ASSESSMENT & PLAN NOTE
73 year old female with multiple medical problems presenting with melena and some bright red blood with associated pre-syncope found to have hgb of 5.9 (from baseline ~10). CTA with acute gastrointestinal bleed at the level of the cecum. Patient had embolization by IR but continued to to have larger volume dark red blood in stool with falling hgb, repeat CTA without active bleed.     S/p 9U pRBCs, 1 FFP, 1 platelet      Colonoscopy performed 5/16 with no evidence of active bleed, old blood and clots evacuated  IR consulted, embolization performed 5/14    Patient stepped back up to MICU for higher level of care  Hgb down to 7 from 10  Had episode of melena  Dark NG tube output, likely bilious as opposed to blood    -- diet NPO  -- Protonix 40 mg IV BID  -- Hold all A/C and A/P agents, on only DVT prophylaxis  -- will reach out to GI for re-evaluation  -- Correct any coagulopathy with platelets and FFP for goal of platelets > 50K and INR <2.0   -- Maintain large bore IV access   -- MAP > 65 goal   -- Maintain type and screen   -- Trend CBCs

## 2024-05-23 NOTE — SUBJECTIVE & OBJECTIVE
Interval History:  Reports of melena and coffee-ground emesis last night into early this morning.  Patient denies any symptoms of anemia at this time and hemoglobin has been stable.  Reports passing flatus, denies any abdominal pain.  No evidence of hypovolemic shock    Review of Systems   Unable to perform ROS: Other     Objective:     Vital Signs (Most Recent):  Temp: 98.3 °F (36.8 °C) (05/23/24 0445)  Pulse: 94 (05/23/24 0445)  Resp: 18 (05/23/24 0445)  BP: 116/73 (05/23/24 0445)  SpO2: (!) 92 % (05/23/24 0445) Vital Signs (24h Range):  Temp:  [98.3 °F (36.8 °C)-98.7 °F (37.1 °C)] 98.3 °F (36.8 °C)  Pulse:  [] 94  Resp:  [16-43] 18  SpO2:  [91 %-97 %] 92 %  BP: (116-159)/(73-87) 116/73     Weight: 52.6 kg (115 lb 15.4 oz)  Body mass index is 20.54 kg/m².    Intake/Output Summary (Last 24 hours) at 5/23/2024 0825  Last data filed at 5/22/2024 1505  Gross per 24 hour   Intake 45.28 ml   Output --   Net 45.28 ml      Physical Exam  Constitutional:       General: She is not in acute distress.     Appearance: She is ill-appearing.   HENT:      Head: Normocephalic.      Right Ear: External ear normal.      Left Ear: External ear normal.      Nose: Nose normal.      Mouth/Throat:      Mouth: Mucous membranes are dry.   Eyes:      General: No scleral icterus.  Cardiovascular:      Comments: Mild tachycardia, reduce volume pulse  Pulmonary:      Breath sounds: Wheezing (few) present.   Abdominal:      Palpations: Abdomen is soft.      Tenderness: There is no abdominal tenderness.      Comments: Staples to abdomen   Musculoskeletal:      Right lower leg: No edema.      Left lower leg: No edema.   Skin:     General: Skin is warm.   Neurological:      General: No focal deficit present.      Mental Status: She is alert and oriented to person, place, and time.   Psychiatric:         Mood and Affect: Mood normal.         MELD 3.0: 10 at 5/23/2024  4:52 AM  MELD-Na: 6 at 5/23/2024  4:52 AM  Calculated from:  Serum  Creatinine: 0.9 mg/dL (Using min of 1 mg/dL) at 5/23/2024  4:52 AM  Serum Sodium: 141 mmol/L (Using max of 137 mmol/L) at 5/23/2024  4:52 AM  Total Bilirubin: 0.6 mg/dL (Using min of 1 mg/dL) at 5/23/2024  4:52 AM  Serum Albumin: 1.9 g/dL at 5/23/2024  4:52 AM  INR(ratio): 1.0 at 5/23/2024  4:52 AM  Age at listing (hypothetical): 73 years  Sex: Female at 5/23/2024  4:52 AM      Significant Labs:  CBC:  Recent Labs   Lab 05/22/24  0846 05/22/24 1959 05/23/24  0734   WBC 10.34 13.04* 16.77*   HGB 9.3* 10.5* 10.1*   HCT 29.2* 32.9* 31.7*    280 315     CMP:  Recent Labs   Lab 05/22/24 0237 05/23/24  0452    141   K 3.5 3.7    102   CO2 24 27   GLU 85 101   BUN 9 11   CREATININE 0.7 0.9   CALCIUM 7.5* 8.3*   PROT 5.1* 5.6*   ALBUMIN 1.7* 1.9*   BILITOT 0.6 0.6   ALKPHOS 71 75   AST 84* 64*   ALT 53* 50*   ANIONGAP 9 12     PTINR:  Recent Labs   Lab 05/22/24 0237 05/23/24  0452   INR 1.0 1.0       Significant Procedures:   Dobutamine Stress Test with Color Flow: No results found. However, due to the size of the patient record, not all encounters were searched. Please check Results Review for a complete set of results.

## 2024-05-23 NOTE — LETTER
Geraldine 3, 2024                     [facility]  [address] [address]  Phone: [phone]  Fax: [phone]   Patient: Jessica Floyd   MR Number: [id number]   YOB: 1950   Date of Visit: 5/23/2024       To Whom It May Concern::    Ms. Jessica Floyd is a well established patient of my medical practice.     Ms. Lopez is presently admitted at [facility] and is currently in the Intensive care unit. If and when she is released from hospital she will be under the care of her primary caregivers, , Sandie Floyd and her daughter Ramila Floyd. Both of them will be unable to travel on Geraldine 15, when their trip was originally planned to begin. They are requesting that their [employer] tickets to and from [place] to [facility] be placed on indefinite hold if in fact they cannot be reimbursed.     Your cooperation in assisting the family during this difficult time will be greatly appreciated.       If you should have questions, please do not hesitate to call me.     Sincerely,        Effie Olmedo MD           CC    No Recipients

## 2024-05-23 NOTE — SUBJECTIVE & OBJECTIVE
Review of Systems   Unable to perform ROS: Other   Constitutional:  Positive for fatigue. Negative for chills, diaphoresis and fever.   Eyes:  Negative for visual disturbance.   Respiratory:  Negative for cough and shortness of breath.    Cardiovascular:  Negative for chest pain and leg swelling.   Gastrointestinal:  Positive for abdominal pain, blood in stool, nausea and vomiting. Negative for constipation and diarrhea.   Neurological:  Positive for weakness (left leg).   Psychiatric/Behavioral:  Negative for confusion.      Objective:     Vital Signs (Most Recent):  Temp: 98.6 °F (37 °C) (05/23/24 1100)  Pulse: (!) 116 (05/23/24 1437)  Resp: (!) 26 (05/23/24 1437)  BP: 130/66 (05/23/24 1300)  SpO2: 96 % (05/23/24 1437) Vital Signs (24h Range):  Temp:  [98.3 °F (36.8 °C)-98.7 °F (37.1 °C)] 98.6 °F (37 °C)  Pulse:  [] 116  Resp:  [16-43] 26  SpO2:  [85 %-100 %] 96 %  BP: (102-158)/(63-87) 130/66   Weight: 52.6 kg (115 lb 15.4 oz)  Body mass index is 20.54 kg/m².      Intake/Output Summary (Last 24 hours) at 5/23/2024 1559  Last data filed at 5/23/2024 0936  Gross per 24 hour   Intake 0 ml   Output 800 ml   Net -800 ml          Physical Exam  Vitals and nursing note reviewed.   Constitutional:       General: She is not in acute distress.     Appearance: She is ill-appearing.   HENT:      Nose: No congestion or rhinorrhea.      Mouth/Throat:      Mouth: Mucous membranes are dry.   Eyes:      General:         Right eye: No discharge.         Left eye: No discharge.   Cardiovascular:      Rate and Rhythm: Regular rhythm. Tachycardia present.      Pulses: Normal pulses.      Heart sounds: No murmur heard.  Pulmonary:      Effort: Pulmonary effort is normal. No respiratory distress.      Breath sounds: Normal breath sounds. No wheezing.   Abdominal:      General: Abdomen is flat. There is no distension.      Palpations: Abdomen is soft.      Tenderness: There is no abdominal tenderness.   Musculoskeletal:       Cervical back: No rigidity.      Right lower leg: Edema (1+) present.      Left lower leg: Edema (1+) present.   Neurological:      General: No focal deficit present.      Mental Status: She is alert and oriented to person, place, and time.            Vents:  Oxygen Concentration (%): 0.5 (05/22/24 1315)  Lines/Drains/Airways       Drain  Duration                  NG/OG Tube 05/23/24 0930 16 Fr. Right nostril <1 day              Peripheral Intravenous Line  Duration                  Peripheral IV - Single Lumen 05/18/24 0715 20 G;1 3/4 in Anterior;Right Forearm 5 days         Peripheral IV - Single Lumen 05/18/24 0900 20 G;1 3/4 in Anterior;Left Upper Arm 5 days                  Significant Labs:    CBC/Anemia Profile:  Recent Labs   Lab 05/22/24  1959 05/23/24  0734 05/23/24  1254   WBC 13.04* 16.77* 18.77*   HGB 10.5* 10.1* 7.5*   HCT 32.9* 31.7* 24.4*    315 263   MCV 94 96 98   RDW 17.9* 17.9* 17.5*        Chemistries:  Recent Labs   Lab 05/22/24  0237 05/23/24  0452    141   K 3.5 3.7    102   CO2 24 27   BUN 9 11   CREATININE 0.7 0.9   CALCIUM 7.5* 8.3*   ALBUMIN 1.7* 1.9*   PROT 5.1* 5.6*   BILITOT 0.6 0.6   ALKPHOS 71 75   ALT 53* 50*   AST 84* 64*   MG 1.9 2.2   PHOS 2.9 4.3       All pertinent labs within the past 24 hours have been reviewed.    Significant Imaging:  I have reviewed all pertinent imaging results/findings within the past 24 hours.

## 2024-05-23 NOTE — CONSULTS
Patient evaluated by Critical Care. To be admitted to MICU. Full H&P to follow.    Karen Trevino MD  U Critical Care Fellow  5/23/2024 @ 5:16 PM

## 2024-05-23 NOTE — PROGRESS NOTES
[facility] - Stepdown Flex ([facility]-14)  General Surgery  Progress Note    Subjective:     History of Present Illness:  Jessica Floyd is a 73 y.o. female with a history of HTN, HFpEF, CKD3, GERD, and CAD who presented to the ED on 5/14/24 with melena. She is altered on my interview and so the history was collected from chart review and the family. Appears she was feeling light headed and as though she was going to pass out. She also noted hematochezia. Upon arrival she was AF, tachycardic, and hypotensive. CTA was concerning for GIB and so she was taken to IR where she was found to have extravasation from the ileal branch of the SMA. They performed a coil embolization of a tertiary branch. Following this, she was reportedly doing well from a mentation standpoint, but has continued to require blood transfusion. She had an EGD on 5/15 and C-scope on 5/16 neither of which revealed a source for her bleed although GI was not able to reach the cecum due to tortuosity. Over the last couple of days, she has had worsening abdominal pain and she is not able to participate with my interview due to AMS. Her family feels this is pain related although she has been received narcotics and ativan PRN.      She had a low grade fever to 100.6 earlier today. Currently tachycardic to the 100s but HDS. Her CBC is notable for WBC 40 (stable from prior) and H/H 8.6/25.5. her CMP shows a 2 bili of 2.2 but is otherwise unremarkable. Lactic acid is normal at 1.3. She had a repeat CT A/P today which showed dilated bowel consistent with an ileus. However, per her family and the primary team, her pain seems out of proportion to the findings.     Post-Op Info:  Procedure(s) (LRB):  LAPAROSCOPY, DIAGNOSTIC (N/A)  LAPAROTOMY, EXPLORATORY (N/A)  EXCISION, SMALL INTESTINE (N/A)  REPAIR, HERNIA, INGUINAL (Bilateral)   6 Days Post-Op     Interval History: Multiple episodes of emesis overnight, reportedly coffee ground in appearance. Passing flatus, BM  this morning. Distended on exam. Episode of desaturation this AM to mid 80s, improved with NRB then weaned to NC.   KUB with dilated loops of bowel and gastric bubble. NGT placed with immediate return ~1L of enteric contents.   H/H stable   Family at bedside    Medications:  Continuous Infusions:   sodium chloride 0.9%   Intravenous Continuous           Scheduled Meds:   cyanocobalamin  1,000 mcg Intramuscular Weekly    LORazepam        pantoprazole  40 mg Intravenous BID     PRN Meds:  Current Facility-Administered Medications:     sodium chloride 0.9%, , Intravenous, PRN    acetaminophen, 650 mg, Oral, Q6H PRN    albuterol-ipratropium, 3 mL, Nebulization, Q4H PRN    aluminum & magnesium hydroxide-simethicone, 30 mL, Oral, Q6H PRN    LIDOcaine, , Topical (Top), BID PRN    LORazepam, , ,     melatonin, 6 mg, Oral, Nightly PRN    ondansetron, 4 mg, Intravenous, Q4H PRN    simethicone, 1 tablet, Oral, TID PRN    sodium chloride, 1 spray, Each Nostril, PRN    sodium chloride 0.9%, 10 mL, Intravenous, PRN     Review of patient's allergies indicates:  No Known Allergies  Objective:     Vital Signs (Most Recent):  Temp: 98.7 °F (37.1 °C) (05/23/24 1000)  Pulse: 107 (05/23/24 1000)  Resp: (!) 25 (05/23/24 1000)  BP: 122/65 (05/23/24 1000)  SpO2: (!) 85 % (05/23/24 1000) Vital Signs (24h Range):  Temp:  [98.3 °F (36.8 °C)-98.7 °F (37.1 °C)] 98.7 °F (37.1 °C)  Pulse:  [] 107  Resp:  [16-43] 25  SpO2:  [85 %-100 %] 85 %  BP: (102-159)/(65-87) 122/65     Weight: 52.6 kg (115 lb 15.4 oz)  Body mass index is 20.54 kg/m².    Intake/Output - Last 3 Shifts         05/21 0700 05/22 0659 05/22 0700 05/23 0659 05/23 0700 05/24 0659    P.O. 328  0    I.V. (mL/kg) 50 (1) 45.3 (0.9)     IV Piggyback 219.6      Total Intake(mL/kg) 597.5 (11.4) 45.3 (0.9) 0 (0)    Urine (mL/kg/hr) 625 (0.5)      Drains   800    Stool 0      Total Output 625  800    Net -27.5 +45.3 -800           Urine Occurrence 1 x 3 x     Stool Occurrence 3 x 2  x              Physical Exam  Vitals and nursing note reviewed.   Constitutional:       General: She is not in acute distress.     Appearance: She is well-developed. She is not diaphoretic.      Interventions: Nasal cannula in place.      Comments: O2 via NC   HENT:      Head: Normocephalic and atraumatic.      Mouth/Throat:      Mouth: Mucous membranes are moist.      Pharynx: Oropharynx is clear. No oropharyngeal exudate.   Eyes:      General: No scleral icterus.     Extraocular Movements: Extraocular movements intact.   Cardiovascular:      Rate and Rhythm: Normal rate.   Pulmonary:      Effort: Pulmonary effort is normal. No respiratory distress.   Abdominal:      General: There is distension.      Palpations: Abdomen is soft.      Tenderness: There is no guarding or rebound.      Comments: Distended but soft. Incision is clean, dry, and intact without drainage, erythema, or increased warmth. Appropriately TTP.   Musculoskeletal:         General: No deformity.   Skin:     General: Skin is warm and dry.      Coloration: Skin is not jaundiced.   Neurological:      General: No focal deficit present.      Mental Status: She is alert and oriented to person, place, and time.      Cranial Nerves: No cranial nerve deficit.          Significant Labs:  I have reviewed all pertinent lab results within the past 24 hours.    Significant Diagnostics:  I have reviewed all pertinent imaging results/findings within the past 24 hours.  Assessment/Plan:     * Acute lower GI bleeding  Jessica Floyd is a 73 y.o. female who presented with a GIB s/p IR embolization of a tertiary branch of the ileal artery now with severe LLQ pain. She is now s/p diagnostic laparotomy converted to ex lap with small bowel resection and primary tissue repair of bilateral inguinal hernias 5/17. Previously with ROBF and tolerating diet, now with emesis and dilated bowel concerning for ileus     - NPO  - NGT to LIWS  - Convert meds to IV as able. Likely will  not absorb PO in setting of ileus   - H/H stable   - Aspiration precautions  - Replete lytes PRN   - K>4, Mag>2, Phos>3  - Abx per primary  - Okay for PT/OT. Would recommend early mobilization as tolerated  - Remainder of care per primary team  - Please contact general surgery with any questions, concerns, or clinical status changes    Case discussed with Dr. Oscar.      Jose Armando Knight PA-C  General Surgery  [facility] - Stepdown Flex ([place]-14)

## 2024-05-23 NOTE — PLAN OF CARE
Unit [facility] Care Support Interaction      I have reviewed the chart of Jessica Floyd who is hospitalized for Acute lower GI bleeding. The patient is currently located in the following unit: 14W       I have seen and examined the patient and provided the following support:     Proactive rounds - patient was unstable - activated RRT and primary team was alerted. Patient with bilious emesis and abdominal distension, loose dark stool. Proactively rounded, stat KUB with dilated loops of bowel. Episode of desaturation to mid 80s with quick recovery on NRB and able to wean to NC. NG tube placed with ~1.4L thus far and improvement in symptoms. Hgb stable.        Caitlyn Su PA-C  Unit Based [facility]

## 2024-05-23 NOTE — CARE UPDATE
Per outcome of multispecialists discussion.  GI has no plans for further scoping and sees no further need for CTA abdomen pelvis imaging, Gen surg in agreement with NG tube and strict NPO, given stable hemoglobin and no reports of bleeding from NG after placement, hold off on CT for now.  We will continue to monitor patient closely

## 2024-05-23 NOTE — SUBJECTIVE & OBJECTIVE
Interval History: Multiple episodes of emesis overnight, reportedly coffee ground in appearance. Passing flatus, BM this morning. Distended on exam. Episode of desaturation this AM to mid 80s, improved with NRB then weaned to NC.   KUB with dilated loops of bowel and gastric bubble. NGT placed with immediate return ~1L of enteric contents.   H/H stable   Family at bedside    Medications:  Continuous Infusions:   sodium chloride 0.9%   Intravenous Continuous           Scheduled Meds:   cyanocobalamin  1,000 mcg Intramuscular Weekly    LORazepam        pantoprazole  40 mg Intravenous BID     PRN Meds:  Current Facility-Administered Medications:     sodium chloride 0.9%, , Intravenous, PRN    acetaminophen, 650 mg, Oral, Q6H PRN    albuterol-ipratropium, 3 mL, Nebulization, Q4H PRN    aluminum & magnesium hydroxide-simethicone, 30 mL, Oral, Q6H PRN    LIDOcaine, , Topical (Top), BID PRN    LORazepam, , ,     melatonin, 6 mg, Oral, Nightly PRN    ondansetron, 4 mg, Intravenous, Q4H PRN    simethicone, 1 tablet, Oral, TID PRN    sodium chloride, 1 spray, Each Nostril, PRN    sodium chloride 0.9%, 10 mL, Intravenous, PRN     Review of patient's allergies indicates:  No Known Allergies  Objective:     Vital Signs (Most Recent):  Temp: 98.7 °F (37.1 °C) (05/23/24 1000)  Pulse: 107 (05/23/24 1000)  Resp: (!) 25 (05/23/24 1000)  BP: 122/65 (05/23/24 1000)  SpO2: (!) 85 % (05/23/24 1000) Vital Signs (24h Range):  Temp:  [98.3 °F (36.8 °C)-98.7 °F (37.1 °C)] 98.7 °F (37.1 °C)  Pulse:  [] 107  Resp:  [16-43] 25  SpO2:  [85 %-100 %] 85 %  BP: (102-159)/(65-87) 122/65     Weight: 52.6 kg (115 lb 15.4 oz)  Body mass index is 20.54 kg/m².    Intake/Output - Last 3 Shifts         05/21 0700 05/22 0659 05/22 0700 05/23 0659 05/23 0700 05/24 0659    P.O. 328  0    I.V. (mL/kg) 50 (1) 45.3 (0.9)     IV Piggyback 219.6      Total Intake(mL/kg) 597.5 (11.4) 45.3 (0.9) 0 (0)    Urine (mL/kg/hr) 625 (0.5)      Drains   800    Stool  0      Total Output 625  800    Net -27.5 +45.3 -800           Urine Occurrence 1 x 3 x     Stool Occurrence 3 x 2 x              Physical Exam  Vitals and nursing note reviewed.   Constitutional:       General: She is not in acute distress.     Appearance: She is well-developed. She is not diaphoretic.      Interventions: Nasal cannula in place.      Comments: O2 via NC   HENT:      Head: Normocephalic and atraumatic.      Mouth/Throat:      Mouth: Mucous membranes are moist.      Pharynx: Oropharynx is clear. No oropharyngeal exudate.   Eyes:      General: No scleral icterus.     Extraocular Movements: Extraocular movements intact.   Cardiovascular:      Rate and Rhythm: Normal rate.   Pulmonary:      Effort: Pulmonary effort is normal. No respiratory distress.   Abdominal:      General: There is distension.      Palpations: Abdomen is soft.      Tenderness: There is no guarding or rebound.      Comments: Distended but soft. Incision is clean, dry, and intact without drainage, erythema, or increased warmth. Appropriately TTP.   Musculoskeletal:         General: No deformity.   Skin:     General: Skin is warm and dry.      Coloration: Skin is not jaundiced.   Neurological:      General: No focal deficit present.      Mental Status: She is alert and oriented to person, place, and time.      Cranial Nerves: No cranial nerve deficit.          Significant Labs:  I have reviewed all pertinent lab results within the past 24 hours.    Significant Diagnostics:  I have reviewed all pertinent imaging results/findings within the past 24 hours.

## 2024-05-23 NOTE — PLAN OF CARE
MICU DAILY GOALS     Family/Goals of care/Code Status   Code Status: Full Code    24H Vital Sign Range  Temp:  [98.3 °F (36.8 °C)-98.7 °F (37.1 °C)]   Pulse:  []   Resp:  [18-32]   BP: (102-153)/(63-87)   SpO2:  [85 %-100 %]      Shift Events (include procedures and significant events)   No acute events throughout shift    AWAKE RASS: Goal - RASS Goal: 0-->alert and calm  Actual - RASS ([place] Agitation-Sedation Scale): drowsy    Restraint necessity: Not necessary   BREATHE SBT: Not intubated    Coordinate A & B, analgesics/sedatives Pain: managed   SAT: Not intubated   Delirium CAM-ICU: Overall CAM-ICU: Negative   Early(intubated/ Progressive (non-intubated) Mobility MOVE Screen (INTUBATED ONLY): Not intubated    Activity: Activity Management: Rolling - L1   Feeding/Nutrition Diet order: Diet/Nutrition Received: NPO,     Thrombus DVT prophylaxis: VTE Required Core Measure: Pharmacological prophylaxis initiated/maintained   HOB Elevation Head of Bed (HOB) Positioning: HOB elevated   Ulcer Prophylaxis GI: yes   Glucose control managed     Skin Skin assessed during: Daily Assessment    Sacrum intact/not altered? Yes  Heels intact/not altered? Yes  Surgical wound? Yes    CHECK ONE!   (no altered skin or altered skin) and sub boxes:  [] No Altered Skin Integrity Present    []Prevention Measures Documented    [x] Altered Skin Integrity Present or Discovered   [x] LDA present in EPIC, daily doc completed              [x] LDA added if not in EPIC (describe wound).                    When describing wound, do not stage, use descriptive words only.    [] Wound Image Taken (required on admit,                   transfer/discharge and every Tuesday)    Wound Care Consulted? No    4 EYES:  Attending Nurse (1st set of eyes): BARRY Fox    Second RN/Staff Member (2nd set of eyes): KACIE Johnson   Bowel Function no issues    Indwelling Catheter Necessity [REMOVED]      Urethral Catheter 05/15/24 1600-Reason for Continuing  Urinary Catheterization: Critically ill in ICU and requiring hourly monitoring of intake/output          De-escalation Antibiotics No        VS and assessment per flow sheet, patient progressing towards goals as tolerated, plan of care reviewed with  pt Jessica Floyd and family , all concerns addressed, will continue to monitor.

## 2024-05-23 NOTE — CARE UPDATE
RAPID RESPONSE NURSE PROACTIVE ROUNDING NOTE       Time of Visit: 1005    Admit Date: 2024  LOS: 9  Code Status: Full Code   Date of Visit: 2024  : 1950  Age: 73 y.o.  Sex: female  Race: White  Bed: 31647/61383 A:   MRN: [id number]  Was the patient discharged from an ICU this admission? Yes   Was the patient discharged from a PACU within last 24 hours? No   Did the patient receive conscious sedation/general anesthesia in last 24 hours? No  Was the patient in the ED within the past 24 hours? No  Was the patient on NIPPV within the past 24 hours? No   Attending Physician: Danis Davis MD  Primary Service: [facility]   Time spent at the bedside: 30 - 45 min    SITUATION    Notified by charge RN during rounding.  Reason for alert: desaturation  Called to evaluate the patient for Respiratory    BACKGROUND     Why is the patient in the hospital?: Acute lower GI bleeding    Patient has a past medical history of Allergic rhinitis, Amblyopia, Carotid stenosis, Coronary atherosclerosis, Dyslipidemia, ESBL (extended spectrum beta-lactamase) producing bacteria infection, Hypertension, Nausea and vomiting, Pulmonary emphysema, SAH (subarachnoid hemorrhage), and Subarachnoid hemorrhage due to ruptured aneurysm.    Last Vitals:  Temp: 98.7 °F (37.1 °C) ( 1000)  Pulse: 107 ( 1000)  Resp: 25 ( 1000)  BP: 122/65 ( 1000)  SpO2: 85 % ( 1000)    24 Hours Vitals Range:  Temp:  [98.3 °F (36.8 °C)-98.7 °F (37.1 °C)]   Pulse:  []   Resp:  [16-43]   BP: (102-159)/(65-87)   SpO2:  [85 %-100 %]     Labs:  Recent Labs     24  0846 24  1959 24  0734   WBC 10.34 13.04* 16.77*   HGB 9.3* 10.5* 10.1*   HCT 29.2* 32.9* 31.7*    280 315       Recent Labs     24  0434 24  0237 24  0452    141 141   K 3.5 3.5 3.7    108 102   CO2 25 24 27   BUN 11 9 11   CREATININE 0.8 0.7 0.9   * 85 101   PHOS 2.7 2.9 4.3   MG 1.7 1.9 2.2        No results  "for input(s): "PH", "PCO2", "PO2", "HCO3", "POCSATURATED", "BE" in the last 72 hours.     ASSESSMENT    Physical Exam  Eyes:      Pupils: Pupils are equal, round, and reactive to light.   Pulmonary:      Effort: Pulmonary effort is normal.   Abdominal:      General: There is distension.      Palpations: Abdomen is rigid.      Tenderness: There is abdominal tenderness.   Skin:     Capillary Refill: Capillary refill takes less than 2 seconds.   Neurological:      Mental Status: She is alert.         INTERVENTIONS    The patient was seen for Respiratory problem. Staff concerns included oxygen saturation < 90% despite supplemental oxygen and increased oxygen requirements. The following interventions were performed: supplemental oxygen, albuterol-ipratropium (DUO-NEB) 0.5-3 mg/ 3 ml nebulizer for wheezing, and NG tube insertion.  NG tube inserted with brown colored fluid return.  Connected to LIWS and drained over 500 mL immediately.  Surgery team at bedside and aware of drainage color and amount.      RECOMMENDATIONS    NG to suction.  Oxygen as needed.    PROVIDER ESCALATION    Yes/No  Yes    Orders received and case discussed with  KAVITA Adkins.    Disposition: Remain in room 56314.    FOLLOW-UP    Charge Erma REDDY  updated on plan of care. Instructed to call the Rapid Response Nurse, Ginger Amaya RN at [phone] for additional questions or concerns.           "

## 2024-05-23 NOTE — HOSPITAL COURSE
MICU Course:  Determined to have an active bleed at the level of the cecum s/p embolization with IR. Post operatively she was noted to have increasing abdominal pain and underwent ex lap with general surgery for incarcerated inguinal hernia on 5/17. She remained stable postoperatively. Patient did require intermittent precedex for severe delirium/agitation but was able to be weaned off. Hgb remained stable, tolerating regular diet. She was deemed stable for stepdown to  05/22/2024.      Course:  On 5/23/24, patient had significant clinical decline and found to have bowel obstruction, respiratory failure and a fall in hemoglobin 2/2 GIB.  She was transferred back to ICU.    MICU Course:   NG tube placed, and general surgery consulted.  During stay in ICU, patient had improvement, NG tube was removed, no further reports of bleed and hemoglobin stable. She was deemed stable to step back down to .      Course:  Initially, [facility] accepted patient, but insurance denied. Patient was awaiting SNF placement. Staples removed. She had normalization of bowel movements. HCTZ added back w/LE edema. She was found to be accepted into SNF and deemed stable for discharge.     Pt deemed appropriate for discharge. Plan discussed with pt, who was agreeable and amenable; medications were discussed and reviewed, outpatient follow-up scheduled, ER precautions were given, all questions were answered to the pt's satisfaction, and Mrs. Floyd was subsequently discharged.    Physical Exam  Gen: in NAD, appears stated age  Neuro: AAOx3, motor, sensory, and strength grossly intact BL  HEENT: NTNC, EOMI, PERRL, MMM  CVS: RRR, no m/r/g; S1/S2 auscultated with no S3 or S4; capillary refill < 2 sec  Resp: lungs CTAB, no w/r/r; no belabored breathing or accessory muscle use appreciated   Abd: well-healing vertical midline incision, BS+ in all 4 quadrants; NTND, soft to palpation; no organomegaly appreciated   Extrem: pulses full, equal, and  regular over all 4 extremities; no UE or LE edema

## 2024-05-23 NOTE — PT/OT/SLP PROGRESS
Physical Therapy      Patient Name:  Jessica Floyd   MRN:  [id number]    Patient not seen today secondary to nursing hold. RN Brody stating that pt likely with GI bleed and is set to be transferred to ICU.  Will follow-up as appropriate. At this time, pt is within and still meeting her current PT POC.      Pura James, PTA  5/23/2024

## 2024-05-23 NOTE — ASSESSMENT & PLAN NOTE
AXR was done. Patient with distended abdomen with AXR showing dilated bowel, Nurse passing NG at bedside, f/u with Gen sx

## 2024-05-23 NOTE — PT/OT/SLP PROGRESS
Occupational Therapy      Patient Name:  Jessica Floyd   MRN:  [id number]    Patient not seen today secondary to  (NSG hold). Will follow-up /c OT POC.    5/23/2024

## 2024-05-23 NOTE — HPI
"Mrs. Floyd is a 73 year old female with PMH notable for COPD on home 2L NC, pulm HTN, R carotid stent on ASA, HFpEF, anemia, and ERIK on infusions who presented to [facility] ED with the melena.  is at bedside and reports that the patient had an episode of diarrhea and lethargy yesterday. She reports that it felt as if "she was going to pass out", so she was using her walker to get around. Patient's  reports that this has never happened before. Patient denies excessive OTC NSAID use. She reports that she drinks 2 wine glasses a day. This morning when the patient woke up she felt very lethargic. Patient went to use the bathroom, and  noted bright red stool in the bowl. Patient reported a pre syncopal episode and felt lightheaded. She admits to weakness, SOB, light-headedness, hematochezia, and pre syncope. Patient denies nausea, vomiting, hematemesis, falls, confusion, chest pain, urinary changes, and fevers. Patient reports taking aspirin daily but no DOAC.      Hemoglobin 5.9 at presentation to ED (baseline ~10). GI and IR consulted given concern for active bleed seen on CTA. In the emergency department she was given 1L IVF and a 80 mg IV protonix bolus. Critical care medicine. consulted for GIB. CTA in the Ed revealed "Acute gastrointestinal bleed at the level of the cecum". IR planning for embolization today. Patient is also receiving 2 pRBC due to acute anemia.   "

## 2024-05-23 NOTE — ASSESSMENT & PLAN NOTE
Noted on CT A/P  Previously thought to have resolved  Now with significant emesis  KUB concerning for continued ileus vs pSBO  Surgery away  Now s/p NGT    -NGT to LIWS  -IVF to match output  -NPO

## 2024-05-23 NOTE — ASSESSMENT & PLAN NOTE
Leukocytosis on admission, initially thought to be acute phase reactant to GIB as patient afebrile.   S/p 5 days IV Zosyn to cover for intraabdominal infection

## 2024-05-24 LAB
ALBUMIN SERPL BCP-MCNC: 1.5 G/DL (ref 3.5–5.2)
ALP SERPL-CCNC: 63 U/L (ref 55–135)
ALT SERPL W/O P-5'-P-CCNC: 35 U/L (ref 10–44)
ANION GAP SERPL CALC-SCNC: 9 MMOL/L (ref 8–16)
ANISOCYTOSIS BLD QL SMEAR: SLIGHT
AST SERPL-CCNC: 46 U/L (ref 10–40)
BASOPHILS # BLD AUTO: 0.15 K/UL (ref 0–0.2)
BASOPHILS # BLD AUTO: 0.17 K/UL (ref 0–0.2)
BASOPHILS NFR BLD: 0.8 % (ref 0–1.9)
BASOPHILS NFR BLD: 1 % (ref 0–1.9)
BILIRUB SERPL-MCNC: 0.4 MG/DL (ref 0.1–1)
BUN SERPL-MCNC: 10 MG/DL (ref 8–23)
BURR CELLS BLD QL SMEAR: ABNORMAL
CALCIUM SERPL-MCNC: 7.3 MG/DL (ref 8.7–10.5)
CHLORIDE SERPL-SCNC: 109 MMOL/L (ref 95–110)
CO2 SERPL-SCNC: 25 MMOL/L (ref 23–29)
CREAT SERPL-MCNC: 0.7 MG/DL (ref 0.5–1.4)
DACRYOCYTES BLD QL SMEAR: ABNORMAL
DIFFERENTIAL METHOD BLD: ABNORMAL
DIFFERENTIAL METHOD BLD: ABNORMAL
DOHLE BOD BLD QL SMEAR: PRESENT
EOSINOPHIL # BLD AUTO: 0.3 K/UL (ref 0–0.5)
EOSINOPHIL # BLD AUTO: 0.5 K/UL (ref 0–0.5)
EOSINOPHIL NFR BLD: 1.6 % (ref 0–8)
EOSINOPHIL NFR BLD: 3.1 % (ref 0–8)
ERYTHROCYTE [DISTWIDTH] IN BLOOD BY AUTOMATED COUNT: 18 % (ref 11.5–14.5)
ERYTHROCYTE [DISTWIDTH] IN BLOOD BY AUTOMATED COUNT: 18.1 % (ref 11.5–14.5)
EST. GFR  (NO RACE VARIABLE): >60 ML/MIN/1.73 M^2
FINAL PATHOLOGIC DIAGNOSIS: NORMAL
GIANT PLATELETS BLD QL SMEAR: PRESENT
GLUCOSE SERPL-MCNC: 92 MG/DL (ref 70–110)
GROSS: NORMAL
HCT VFR BLD AUTO: 26.7 % (ref 37–48.5)
HCT VFR BLD AUTO: 26.8 % (ref 37–48.5)
HGB BLD-MCNC: 8.1 G/DL (ref 12–16)
HGB BLD-MCNC: 8.2 G/DL (ref 12–16)
HYPOCHROMIA BLD QL SMEAR: ABNORMAL
IMM GRANULOCYTES # BLD AUTO: 0.32 K/UL (ref 0–0.04)
IMM GRANULOCYTES # BLD AUTO: 0.39 K/UL (ref 0–0.04)
IMM GRANULOCYTES NFR BLD AUTO: 1.9 % (ref 0–0.5)
IMM GRANULOCYTES NFR BLD AUTO: 2.1 % (ref 0–0.5)
INR PPP: 1 (ref 0.8–1.2)
LYMPHOCYTES # BLD AUTO: 1 K/UL (ref 1–4.8)
LYMPHOCYTES # BLD AUTO: 1.2 K/UL (ref 1–4.8)
LYMPHOCYTES NFR BLD: 5 % (ref 18–48)
LYMPHOCYTES NFR BLD: 7 % (ref 18–48)
Lab: NORMAL
MAGNESIUM SERPL-MCNC: 1.8 MG/DL (ref 1.6–2.6)
MCH RBC QN AUTO: 29.9 PG (ref 27–31)
MCH RBC QN AUTO: 30 PG (ref 27–31)
MCHC RBC AUTO-ENTMCNC: 30.2 G/DL (ref 32–36)
MCHC RBC AUTO-ENTMCNC: 30.7 G/DL (ref 32–36)
MCV RBC AUTO: 98 FL (ref 82–98)
MCV RBC AUTO: 99 FL (ref 82–98)
MONOCYTES # BLD AUTO: 2.9 K/UL (ref 0.3–1)
MONOCYTES # BLD AUTO: 3.1 K/UL (ref 0.3–1)
MONOCYTES NFR BLD: 16.2 % (ref 4–15)
MONOCYTES NFR BLD: 17.4 % (ref 4–15)
NEUTROPHILS # BLD AUTO: 11.5 K/UL (ref 1.8–7.7)
NEUTROPHILS # BLD AUTO: 14 K/UL (ref 1.8–7.7)
NEUTROPHILS NFR BLD: 69.6 % (ref 38–73)
NEUTROPHILS NFR BLD: 74.3 % (ref 38–73)
NRBC BLD-RTO: 0 /100 WBC
NRBC BLD-RTO: 0 /100 WBC
OVALOCYTES BLD QL SMEAR: ABNORMAL
PHOSPHATE SERPL-MCNC: 3 MG/DL (ref 2.7–4.5)
PLATELET # BLD AUTO: 302 K/UL (ref 150–450)
PLATELET # BLD AUTO: 330 K/UL (ref 150–450)
PLATELET BLD QL SMEAR: ABNORMAL
PMV BLD AUTO: 10.2 FL (ref 9.2–12.9)
PMV BLD AUTO: 10.3 FL (ref 9.2–12.9)
POIKILOCYTOSIS BLD QL SMEAR: SLIGHT
POLYCHROMASIA BLD QL SMEAR: ABNORMAL
POTASSIUM SERPL-SCNC: 3.2 MMOL/L (ref 3.5–5.1)
PROT SERPL-MCNC: 4.5 G/DL (ref 6–8.4)
PROTHROMBIN TIME: 11.1 SEC (ref 9–12.5)
RBC # BLD AUTO: 2.71 M/UL (ref 4–5.4)
RBC # BLD AUTO: 2.73 M/UL (ref 4–5.4)
SMUDGE CELLS BLD QL SMEAR: PRESENT
SODIUM SERPL-SCNC: 143 MMOL/L (ref 136–145)
SPHEROCYTES BLD QL SMEAR: ABNORMAL
TARGETS BLD QL SMEAR: ABNORMAL
TOXIC GRANULES BLD QL SMEAR: PRESENT
WBC # BLD AUTO: 16.54 K/UL (ref 3.9–12.7)
WBC # BLD AUTO: 18.82 K/UL (ref 3.9–12.7)
WBC TOXIC VACUOLES BLD QL SMEAR: PRESENT

## 2024-05-24 PROCEDURE — 25000003 PHARM REV CODE 250: Mod: NTX

## 2024-05-24 PROCEDURE — 99900035 HC TECH TIME PER 15 MIN (STAT): Mod: NTX

## 2024-05-24 PROCEDURE — 80053 COMPREHEN METABOLIC PANEL: CPT | Mod: NTX

## 2024-05-24 PROCEDURE — C9113 INJ PANTOPRAZOLE SODIUM, VIA: HCPCS | Mod: NTX | Performed by: INTERNAL MEDICINE

## 2024-05-24 PROCEDURE — 84100 ASSAY OF PHOSPHORUS: CPT | Mod: NTX

## 2024-05-24 PROCEDURE — 99291 CRITICAL CARE FIRST HOUR: CPT | Mod: GC,NTX,, | Performed by: INTERNAL MEDICINE

## 2024-05-24 PROCEDURE — 63600175 PHARM REV CODE 636 W HCPCS: Mod: NTX

## 2024-05-24 PROCEDURE — 85610 PROTHROMBIN TIME: CPT | Mod: NTX

## 2024-05-24 PROCEDURE — 94761 N-INVAS EAR/PLS OXIMETRY MLT: CPT | Mod: NTX

## 2024-05-24 PROCEDURE — 63600175 PHARM REV CODE 636 W HCPCS: Mod: NTX | Performed by: INTERNAL MEDICINE

## 2024-05-24 PROCEDURE — 27000221 HC OXYGEN, UP TO 24 HOURS: Mod: NTX

## 2024-05-24 PROCEDURE — 83735 ASSAY OF MAGNESIUM: CPT | Mod: NTX

## 2024-05-24 PROCEDURE — 85025 COMPLETE CBC W/AUTO DIFF WBC: CPT | Mod: 91,NTX

## 2024-05-24 PROCEDURE — 20000000 HC ICU ROOM: Mod: NTX

## 2024-05-24 RX ORDER — SODIUM CHLORIDE, SODIUM LACTATE, POTASSIUM CHLORIDE, CALCIUM CHLORIDE 600; 310; 30; 20 MG/100ML; MG/100ML; MG/100ML; MG/100ML
INJECTION, SOLUTION INTRAVENOUS CONTINUOUS
Status: ACTIVE | OUTPATIENT
Start: 2024-05-24 | End: 2024-05-24

## 2024-05-24 RX ORDER — POTASSIUM CHLORIDE 7.45 MG/ML
10 INJECTION INTRAVENOUS
Status: DISPENSED | OUTPATIENT
Start: 2024-05-24 | End: 2024-05-24

## 2024-05-24 RX ORDER — ACETAMINOPHEN 10 MG/ML
1000 INJECTION, SOLUTION INTRAVENOUS ONCE
Status: COMPLETED | OUTPATIENT
Start: 2024-05-24 | End: 2024-05-24

## 2024-05-24 RX ORDER — SODIUM CHLORIDE, SODIUM LACTATE, POTASSIUM CHLORIDE, CALCIUM CHLORIDE 600; 310; 30; 20 MG/100ML; MG/100ML; MG/100ML; MG/100ML
INJECTION, SOLUTION INTRAVENOUS CONTINUOUS
Status: DISCONTINUED | OUTPATIENT
Start: 2024-05-24 | End: 2024-05-24

## 2024-05-24 RX ORDER — FLUTICASONE FUROATE AND VILANTEROL 100; 25 UG/1; UG/1
1 POWDER RESPIRATORY (INHALATION) DAILY
Status: DISCONTINUED | OUTPATIENT
Start: 2024-05-25 | End: 2024-06-03 | Stop reason: HOSPADM

## 2024-05-24 RX ORDER — MAGNESIUM SULFATE HEPTAHYDRATE 40 MG/ML
2 INJECTION, SOLUTION INTRAVENOUS ONCE
Status: COMPLETED | OUTPATIENT
Start: 2024-05-24 | End: 2024-05-24

## 2024-05-24 RX ORDER — KETOROLAC TROMETHAMINE 15 MG/ML
15 INJECTION, SOLUTION INTRAMUSCULAR; INTRAVENOUS ONCE
Status: DISCONTINUED | OUTPATIENT
Start: 2024-05-25 | End: 2024-05-24

## 2024-05-24 RX ORDER — ACETAMINOPHEN 650 MG/20.3ML
650 LIQUID ORAL EVERY 6 HOURS PRN
Status: DISCONTINUED | OUTPATIENT
Start: 2024-05-25 | End: 2024-06-03 | Stop reason: HOSPADM

## 2024-05-24 RX ADMIN — SODIUM CHLORIDE, POTASSIUM CHLORIDE, SODIUM LACTATE AND CALCIUM CHLORIDE: 600; 310; 30; 20 INJECTION, SOLUTION INTRAVENOUS at 08:05

## 2024-05-24 RX ADMIN — PANTOPRAZOLE SODIUM 40 MG: 40 INJECTION, POWDER, FOR SOLUTION INTRAVENOUS at 08:05

## 2024-05-24 RX ADMIN — POTASSIUM CHLORIDE 10 MEQ: 7.46 INJECTION, SOLUTION INTRAVENOUS at 06:05

## 2024-05-24 RX ADMIN — MAGNESIUM SULFATE HEPTAHYDRATE 2 G: 40 INJECTION, SOLUTION INTRAVENOUS at 04:05

## 2024-05-24 RX ADMIN — SODIUM CHLORIDE, POTASSIUM CHLORIDE, SODIUM LACTATE AND CALCIUM CHLORIDE: 600; 310; 30; 20 INJECTION, SOLUTION INTRAVENOUS at 04:05

## 2024-05-24 RX ADMIN — PIPERACILLIN SODIUM AND TAZOBACTAM SODIUM 4.5 G: 4; .5 INJECTION, POWDER, FOR SOLUTION INTRAVENOUS at 11:05

## 2024-05-24 RX ADMIN — POTASSIUM CHLORIDE 10 MEQ: 7.46 INJECTION, SOLUTION INTRAVENOUS at 04:05

## 2024-05-24 RX ADMIN — POTASSIUM CHLORIDE 10 MEQ: 7.46 INJECTION, SOLUTION INTRAVENOUS at 08:05

## 2024-05-24 RX ADMIN — LIDOCAINE: 40 CREAM TOPICAL at 11:05

## 2024-05-24 RX ADMIN — POTASSIUM CHLORIDE 10 MEQ: 7.46 INJECTION, SOLUTION INTRAVENOUS at 05:05

## 2024-05-24 RX ADMIN — SODIUM CHLORIDE, POTASSIUM CHLORIDE, SODIUM LACTATE AND CALCIUM CHLORIDE: 600; 310; 30; 20 INJECTION, SOLUTION INTRAVENOUS at 11:05

## 2024-05-24 RX ADMIN — ACETAMINOPHEN 1000 MG: 10 INJECTION, SOLUTION INTRAVENOUS at 01:05

## 2024-05-24 RX ADMIN — PIPERACILLIN SODIUM AND TAZOBACTAM SODIUM 4.5 G: 4; .5 INJECTION, POWDER, FOR SOLUTION INTRAVENOUS at 06:05

## 2024-05-24 NOTE — ASSESSMENT & PLAN NOTE
Leukocytosis on admission, initially thought to be acute phase reactant to GIB as patient afebrile. S/p 5 days IV Zosyn to cover for intraabdominal infection. Leukocytosis jumped from 13 to 25 after new ileus noted on abdominal imaging.     - Restart Zosyn for empiric intraabdominal coverage

## 2024-05-24 NOTE — PT/OT/SLP DISCHARGE
Occupational Therapy Discharge Summary    Jessica Floyd  MRN: [id number]   Principal Problem: Acute lower GI bleeding      Patient Discharged from acute Occupational Therapy on 5/24/24.  Please refer to prior OT note dated 5/22/24 for functional status.    Assessment:       Pt t/f to MICU for higher level of care. OT will require new orders once medically appropriate to resume participation in treatment.    Objective:     GOALS:   Multidisciplinary Problems       Occupational Therapy Goals          Problem: Occupational Therapy    Goal Priority Disciplines Outcome Interventions   Occupational Therapy Goal     OT, PT/OT Progressing    Description: Goals to be met by: 6/2/24     Patient will increase functional independence with ADLs by performing:    UE Dressing with Stand-by Assistance.  LE Dressing with Minimal Assistance.  Grooming while standing at sink with Minimal Assistance.  Toileting from toilet with Minimal Assistance for hygiene and clothing management.   Toilet transfer to toilet with Minimal Assistance.                         Reasons for Discontinuation of Therapy Services  Transfer to alternate level of care.      Plan:     Patient Discharged to:  ICU    5/24/2024

## 2024-05-24 NOTE — ASSESSMENT & PLAN NOTE
Jessica Floyd is a 73 y.o. female who presented with a GIB s/p IR embolization of a tertiary branch of the ileal artery now with severe LLQ pain. She is now s/p diagnostic laparotomy converted to ex lap with small bowel resection and primary tissue repair of bilateral inguinal hernias 5/17. Previously with ROBF and tolerating diet, now with emesis and dilated bowel concerning for ileus s/p NGT decompression.     - NPO  - NGT to LIWS  - Recommend replacing NGT output 1:1 with LR or other crystalloid   - Convert meds to IV as able. Likely will not absorb PO in setting of ileus   - Follow up today's H/H as it had dropped by one point yesterday afternoon and patients family reported melanotic stools overnight 5/23  - Aspiration precautions  - Replete lytes PRN   - K>4, Mag>2, Phos>3  - Abx per primary  - Okay for PT/OT. Would recommend early mobilization as tolerated  - Remainder of care per primary team  - Please contact general surgery with any questions, concerns, or clinical status changes    Post Op Instructions:  Follow up in general surgery clinic in 2 weeks. Message sent to clinic for scheduling. Clinic # is [phone]  No heavy lifting (>10lbs or a gallon of milk) for 6 weeks from day of surgery.   No pushing or pulling activities such as vacuuming or raking.   Activity as tolerated.   Patient can shower, allow water to run over incisions. No soaking in bath or pools for 2 weeks. Do not pick at surgical glue, it will come off on its own.   You have been prescribed a narcotic pain medication. No driving while taking narcotics and until you can react quickly without pain. Please wean narcotic over the next few days. You may alternate tylenol and ibuprofen for additional pain control.  No straining, avoid constipation. May take OTC stool softeners as described and laxatives as needed.

## 2024-05-24 NOTE — SUBJECTIVE & OBJECTIVE
Interval History: Ms. Floyd underwent NGT placement yesterday with significant output. She denies flatus or BM overnight. Overall she feels better following decompression with NG. AM CBC pending.     Medications:  Continuous Infusions:  Scheduled Meds:   cyanocobalamin  1,000 mcg Intramuscular Weekly    pantoprazole  40 mg Intravenous BID    potassium chloride  10 mEq Intravenous Q1H     PRN Meds:  Current Facility-Administered Medications:     sodium chloride 0.9%, , Intravenous, PRN    acetaminophen, 650 mg, Oral, Q6H PRN    albuterol-ipratropium, 3 mL, Nebulization, Q4H PRN    aluminum & magnesium hydroxide-simethicone, 30 mL, Oral, Q6H PRN    LIDOcaine, , Topical (Top), BID PRN    melatonin, 6 mg, Oral, Nightly PRN    ondansetron, 4 mg, Intravenous, Q4H PRN    simethicone, 1 tablet, Oral, TID PRN    sodium chloride, 1 spray, Each Nostril, PRN    sodium chloride 0.9%, 10 mL, Intravenous, PRN     Review of patient's allergies indicates:  No Known Allergies  Objective:     Vital Signs (Most Recent):  Temp: 98.4 °F (36.9 °C) (05/24/24 0700)  Pulse: 88 (05/24/24 0700)  Resp: (!) 50 (05/24/24 0700)  BP: 135/73 (05/24/24 0700)  SpO2: 98 % (05/24/24 0700) Vital Signs (24h Range):  Temp:  [98.4 °F (36.9 °C)-99.3 °F (37.4 °C)] 98.4 °F (36.9 °C)  Pulse:  [] 88  Resp:  [18-54] 50  SpO2:  [85 %-100 %] 98 %  BP: (103-151)/(54-73) 135/73     Weight: 52.6 kg (115 lb 15.4 oz)  Body mass index is 20.54 kg/m².    Intake/Output - Last 3 Shifts         05/22 0700 05/23 0659 05/23 0700 05/24 0659 05/24 0700 05/25 0659    P.O.  0     I.V. (mL/kg) 45.3 (0.9) 1770.1 (33.7)     NG/GT  0     IV Piggyback  210.4     Total Intake(mL/kg) 45.3 (0.9) 1980.4 (37.7)     Urine (mL/kg/hr)  0 (0)     Drains  810     Other  0     Stool       Total Output  810     Net +45.3 +1170.4            Urine Occurrence 3 x 2 x     Stool Occurrence 2 x               Physical Exam  Vitals and nursing note reviewed.   Constitutional:       General: She  is not in acute distress.     Appearance: She is well-developed. She is not diaphoretic.      Interventions: Nasal cannula in place.      Comments: O2 via NC   HENT:      Head: Normocephalic and atraumatic.      Mouth/Throat:      Mouth: Mucous membranes are moist.      Pharynx: Oropharynx is clear. No oropharyngeal exudate.   Eyes:      General: No scleral icterus.     Extraocular Movements: Extraocular movements intact.   Cardiovascular:      Rate and Rhythm: Normal rate.   Pulmonary:      Effort: Pulmonary effort is normal. No respiratory distress.   Abdominal:      Comments:   Improving abdominal distention that remains soft and appropriately tender to palpation.   Incision is clean, dry, and intact without drainage, erythema, or increased warmth.   Skin:     General: Skin is warm and dry.   Neurological:      General: No focal deficit present.      Mental Status: She is alert and oriented to person, place, and time.      Cranial Nerves: No cranial nerve deficit.          Significant Labs:  I have reviewed all pertinent lab results within the past 24 hours.    Significant Diagnostics:  I have reviewed all pertinent imaging results/findings within the past 24 hours.

## 2024-05-24 NOTE — ASSESSMENT & PLAN NOTE
Hypertension Medications                    amLODIPine (NORVASC) 5 MG tablet TAKE 1 TABLET BY MOUTH EVERY DAY     carvediloL (COREG) 6.25 MG tablet Take 1 tablet (6.25 mg total) by mouth 2 (two) times daily with meals.     hydroCHLOROthiazide (HYDRODIURIL) 25 MG tablet Take 1 tablet (25 mg total) by mouth once daily.       -Will restart home meds as tolerated

## 2024-05-24 NOTE — PROGRESS NOTES
[facility] - Cardiac Medical ICU  General Surgery  Progress Note    Subjective:     History of Present Illness:  Jessica Floyd is a 73 y.o. female with a history of HTN, HFpEF, CKD3, GERD, and CAD who presented to the ED on 5/14/24 with melena. She is altered on my interview and so the history was collected from chart review and the family. Appears she was feeling light headed and as though she was going to pass out. She also noted hematochezia. Upon arrival she was AF, tachycardic, and hypotensive. CTA was concerning for GIB and so she was taken to IR where she was found to have extravasation from the ileal branch of the SMA. They performed a coil embolization of a tertiary branch. Following this, she was reportedly doing well from a mentation standpoint, but has continued to require blood transfusion. She had an EGD on 5/15 and C-scope on 5/16 neither of which revealed a source for her bleed although GI was not able to reach the cecum due to tortuosity. Over the last couple of days, she has had worsening abdominal pain and she is not able to participate with my interview due to AMS. Her family feels this is pain related although she has been received narcotics and ativan PRN.      She had a low grade fever to 100.6 earlier today. Currently tachycardic to the 100s but HDS. Her CBC is notable for WBC 40 (stable from prior) and H/H 8.6/25.5. her CMP shows a 2 bili of 2.2 but is otherwise unremarkable. Lactic acid is normal at 1.3. She had a repeat CT A/P today which showed dilated bowel consistent with an ileus. However, per her family and the primary team, her pain seems out of proportion to the findings.     Post-Op Info:  Procedure(s) (LRB):  LAPAROSCOPY, DIAGNOSTIC (N/A)  LAPAROTOMY, EXPLORATORY (N/A)  EXCISION, SMALL INTESTINE (N/A)  REPAIR, HERNIA, INGUINAL (Bilateral)   7 Days Post-Op     Interval History: Ms. Floyd underwent NGT placement yesterday with significant output. She denies flatus or BM overnight.  Overall she feels better following decompression with NG. AM CBC pending.     Medications:  Continuous Infusions:  Scheduled Meds:   cyanocobalamin  1,000 mcg Intramuscular Weekly    pantoprazole  40 mg Intravenous BID    potassium chloride  10 mEq Intravenous Q1H     PRN Meds:  Current Facility-Administered Medications:     sodium chloride 0.9%, , Intravenous, PRN    acetaminophen, 650 mg, Oral, Q6H PRN    albuterol-ipratropium, 3 mL, Nebulization, Q4H PRN    aluminum & magnesium hydroxide-simethicone, 30 mL, Oral, Q6H PRN    LIDOcaine, , Topical (Top), BID PRN    melatonin, 6 mg, Oral, Nightly PRN    ondansetron, 4 mg, Intravenous, Q4H PRN    simethicone, 1 tablet, Oral, TID PRN    sodium chloride, 1 spray, Each Nostril, PRN    sodium chloride 0.9%, 10 mL, Intravenous, PRN     Review of patient's allergies indicates:  No Known Allergies  Objective:     Vital Signs (Most Recent):  Temp: 98.4 °F (36.9 °C) (05/24/24 0700)  Pulse: 88 (05/24/24 0700)  Resp: (!) 50 (05/24/24 0700)  BP: 135/73 (05/24/24 0700)  SpO2: 98 % (05/24/24 0700) Vital Signs (24h Range):  Temp:  [98.4 °F (36.9 °C)-99.3 °F (37.4 °C)] 98.4 °F (36.9 °C)  Pulse:  [] 88  Resp:  [18-54] 50  SpO2:  [85 %-100 %] 98 %  BP: (103-151)/(54-73) 135/73     Weight: 52.6 kg (115 lb 15.4 oz)  Body mass index is 20.54 kg/m².    Intake/Output - Last 3 Shifts         05/22 0700 05/23 0659 05/23 0700 05/24 0659 05/24 0700 05/25 0659    P.O.  0     I.V. (mL/kg) 45.3 (0.9) 1770.1 (33.7)     NG/GT  0     IV Piggyback  210.4     Total Intake(mL/kg) 45.3 (0.9) 1980.4 (37.7)     Urine (mL/kg/hr)  0 (0)     Drains  810     Other  0     Stool       Total Output  810     Net +45.3 +1170.4            Urine Occurrence 3 x 2 x     Stool Occurrence 2 x               Physical Exam  Vitals and nursing note reviewed.   Constitutional:       General: She is not in acute distress.     Appearance: She is well-developed. She is not diaphoretic.      Interventions: Nasal cannula  in place.      Comments: O2 via NC   HENT:      Head: Normocephalic and atraumatic.      Mouth/Throat:      Mouth: Mucous membranes are moist.      Pharynx: Oropharynx is clear. No oropharyngeal exudate.   Eyes:      General: No scleral icterus.     Extraocular Movements: Extraocular movements intact.   Cardiovascular:      Rate and Rhythm: Normal rate.   Pulmonary:      Effort: Pulmonary effort is normal. No respiratory distress.   Abdominal:      Comments:   Improving abdominal distention that remains soft and appropriately tender to palpation.   Incision is clean, dry, and intact without drainage, erythema, or increased warmth.   Skin:     General: Skin is warm and dry.   Neurological:      General: No focal deficit present.      Mental Status: She is alert and oriented to person, place, and time.      Cranial Nerves: No cranial nerve deficit.          Significant Labs:  I have reviewed all pertinent lab results within the past 24 hours.    Significant Diagnostics:  I have reviewed all pertinent imaging results/findings within the past 24 hours.  Assessment/Plan:     * Acute lower GI bleeding  Jessica Floyd is a 73 y.o. female who presented with a GIB s/p IR embolization of a tertiary branch of the ileal artery now with severe LLQ pain. She is now s/p diagnostic laparotomy converted to ex lap with small bowel resection and primary tissue repair of bilateral inguinal hernias 5/17. Previously with ROBF and tolerating diet, now with emesis and dilated bowel concerning for ileus s/p NGT decompression.     - NPO, NGT to LIWS  - Recommend replacing NGT output 1:1 with LR or other crystalloid   - Convert meds to IV as able. Likely will not absorb PO in setting of ileus   - Follow up today's H/H as it had dropped by one point yesterday afternoon and patients family reported melanotic stools overnight 5/23  - Recommend engaging GI once more if Hb continues to downtrend  - Aspiration precautions  - Replete lytes PRN   -  K>4, Mag>2, Phos>3  - Abx per primary  - Okay for PT/OT. Would recommend early mobilization as tolerated  - Remainder of care per primary team  - Please contact general surgery with any questions, concerns, or clinical status changes    Post Op Instructions:  Follow up in general surgery clinic in 2 weeks. Message sent to clinic for scheduling. Clinic # is [phone]  No heavy lifting (>10lbs or a gallon of milk) for 6 weeks from day of surgery.   No pushing or pulling activities such as vacuuming or raking.   Activity as tolerated.   Patient can shower, allow water to run over incisions. No soaking in bath or pools for 2 weeks. Do not pick at surgical glue, it will come off on its own.   You have been prescribed a narcotic pain medication. No driving while taking narcotics and until you can react quickly without pain. Please wean narcotic over the next few days. You may alternate tylenol and ibuprofen for additional pain control.  No straining, avoid constipation. May take OTC stool softeners as described and laxatives as needed.              Kang Maddox MD  General Surgery  [facility] - Cardiac Medical ICU

## 2024-05-24 NOTE — CONSULTS
20g x 1.75in PIV placed in Right Forearm by LAURA using USG.  20g x 1.75in PIV placed in Right Upper Arm by LAURA using USG.

## 2024-05-24 NOTE — PLAN OF CARE
MICU DAILY GOALS     Family/Goals of care/Code Status   Code Status: Full Code    24H Vital Sign Range  Temp:  [98.4 °F (36.9 °C)-99.3 °F (37.4 °C)]   Pulse:  []   Resp:  [18-54]   BP: (102-151)/(54-71)   SpO2:  [85 %-100 %]      Shift Events (include procedures and significant events)  No acute events throughout shift. No s/s of bleeding, denies abd pain, hemoglobin stable this shift. NGT to LIWS per provider orders with very minimal dark brown output. Weaned O2 from 6L to 3L. Potassium and Magnesium replaced IV.    AWAKE RASS: Goal - RASS Goal: 0-->alert and calm  Actual - RASS ([place] Agitation-Sedation Scale): alert and calm    Restraint necessity: Not necessary   BREATHE SBT: Not intubated    Coordinate A & B, analgesics/sedatives Pain: managed   SAT: Not intubated   Delirium CAM-ICU: Overall CAM-ICU: Negative   Early(intubated/ Progressive (non-intubated) Mobility MOVE Screen (INTUBATED ONLY): Not intubated    Activity: Activity Management: Rolling - L1, Arm raise - L1   Feeding/Nutrition Diet order: Diet/Nutrition Received: NPO,     Thrombus DVT prophylaxis: VTE Required Core Measure: Pharmacological prophylaxis initiated/maintained   HOB Elevation Head of Bed (HOB) Positioning: HOB at 20-30 degrees   Ulcer Prophylaxis GI: yes   Glucose control managed     Skin Skin assessed during: Daily Assessment    Sacrum intact/not altered? No  Heels intact/not altered? Yes  Surgical wound? Yes    CHECK ONE!   (no altered skin or altered skin) and sub boxes:  [] No Altered Skin Integrity Present    []Prevention Measures Documented    [] Altered Skin Integrity Present or Discovered   [] LDA present in EPIC, daily doc completed              [] LDA added if not in EPIC (describe wound).                    When describing wound, do not stage, use descriptive words only.    [] Wound Image Taken (required on admit,                   transfer/discharge and every Tuesday)    Wound Care Consulted? Yes    4  EYES:  Attending Nurse (1st set of eyes): Suraj Arreola RN    Second RN/Staff Member (2nd set of eyes): Mimi Patrick RN   Bowel Function       Indwelling Catheter Necessity [REMOVED]      Urethral Catheter 05/15/24 1600-Reason for Continuing Urinary Catheterization: Critically ill in ICU and requiring hourly monitoring of intake/output       De-escalation Antibiotics Yes        VS and assessment per flow sheet, patient progressing towards goals as tolerated, plan of care reviewed with [unfilled] and family - pt, pt spouse, and pt daughters, all concerns addressed at this time.    Suraj Arreola RN

## 2024-05-24 NOTE — PLAN OF CARE
[facility] - Cardiac Medical ICU  Discharge Reassessment    Primary Care Provider: Effie Olmedo MD    Expected Discharge Date: 5/29/2024    Patient stepped back up to MICU 5/23/2024 [id number] hours from Stepdown Flex [id number] for respiratory issues.     lactated ringers   Intravenous Continuous         Discharge Plan A and Plan B have been determined by review of patient's clinical status, future medical and therapeutic needs, and coverage/benefits for post-acute care in coordination with multidisciplinary team members.        Reassessment (most recent)       Discharge Reassessment - 05/24/24 1035          Discharge Reassessment    Assessment Type Discharge Planning Reassessment     Did the patient's condition or plan change since previous assessment? Yes     Communicated DEBBIE with patient/caregiver Date not available/Unable to determine     Discharge Plan A Rehab     Discharge Plan B Skilled Nursing Facility;Home with family     DME Needed Upon Discharge  other (see comments)   TBD    Transition of Care Barriers None     Why the patient remains in the hospital Requires continued medical care        Post-Acute Status    Post-Acute Authorization Placement     Coverage [facility]GD [facility] CHOICES     Discharge Delays None known at this time                   Dorothy Hung RN     [phone]

## 2024-05-24 NOTE — ASSESSMENT & PLAN NOTE
73 year old female with multiple medical problems presenting with melena and some bright red blood with associated pre-syncope found to have hgb of 5.9 (from baseline ~10). CTA with acute gastrointestinal bleed at the level of the cecum. Patient had embolization by IR (5/14) but continued to to have larger volume dark red blood in stool with falling hgb, repeat CTA without active bleed. S/p 9U pRBCs, 1 FFP, 1 platelet. Colonoscopy performed 5/16 with no evidence of active bleed, old blood and clots evacuated. Tolerated advanced diet and stepped down on 5/22. Patient stepped back up to MICU 5/23 after ileus noted on KUB, NG tube placed, concern for GIB with dark stools however Hgb remained stable. Dark NG tube output, likely bilious as opposed to blood.    -- diet NPO  -- Protonix 40 mg IV BID  -- Hold all A/C and A/P agents, on only DVT prophylaxis  -- If Hgb downtrends, will reach out to GI  -- Correct any coagulopathy with platelets and FFP for goal of platelets > 50K and INR <2.0   -- Maintain large bore IV access   -- MAP > 65 goal   -- Maintain type and screen   -- Trend CBCs

## 2024-05-24 NOTE — ASSESSMENT & PLAN NOTE
Patient with Hypercapnic and Hypoxic Respiratory failure which is Acute.  she is on home oxygen at 2 LPM. Signs/symptoms of respiratory failure include- respiratory distress. Contributing diagnoses includes - COPD and oversedation.  Labs and images were reviewed. Patient Has recent ABG, which has been reviewed. Will treat underlying causes and adjust management of respiratory failure as follows: Patient required intermittent short term BIPAP, now satting well on low flow O2 NC.    - Home inhalers  - IS

## 2024-05-24 NOTE — PROGRESS NOTES
"[facility] - [facility]  Critical Care Medicine  Progress Note    Patient Name: Jessica Floyd  MRN: [id number]  Admission Date: 5/14/2024  Hospital Length of Stay: 10 days  Code Status: Full Code  Attending Provider: Edouard Giles MD  Primary Care Provider: Effie Olmedo MD   Principal Problem: Acute lower GI bleeding    Subjective:     HPI:  Mrs. Floyd is a 73 year old female with PMH notable for COPD on home 2L NC, pulm HTN, R carotid stent on ASA, HFpEF, anemia, and ERIK on infusions who presented to [facility] ED with the melena.  is at bedside and reports that the patient had an episode of diarrhea and lethargy yesterday. She reports that it felt as if "she was going to pass out", so she was using her walker to get around. Patient's  reports that this has never happened before. Patient denies excessive OTC NSAID use. She reports that she drinks 2 wine glasses a day. This morning when the patient woke up she felt very lethargic. Patient went to use the bathroom, and  noted bright red stool in the bowl. Patient reported a pre syncopal episode and felt lightheaded. She admits to weakness, SOB, light-headedness, hematochezia, and pre syncope. Patient denies nausea, vomiting, hematemesis, falls, confusion, chest pain, urinary changes, and fevers. Patient reports taking aspirin daily but no DOAC.     Hemoglobin 5.9 at presentation to ED (baseline ~10). GI and IR consulted given concern for active bleed seen on CTA. In the emergency department she was given 1L IVF and a 80 mg IV protonix bolus. Critical care medicine. consulted for GIB. CTA in the Ed revealed "Acute gastrointestinal bleed at the level of the cecum". IR planning for embolization today. Patient is also receiving 2 pRBC due to acute anemia.     Hospital/ICU Course:  Admitted to ICU for lower GIB s/p embolization with IR 5/14. Colonoscopy performed 5/16 without evidence of active bleed, old blood/clots evacuated. Patient is s/p " 8U pRBCs, 1 FFP, 1 platelet. Course complicated by worsening abdominal pain, general surgery consulted for further evaluation. Patient underwent ex lap 5/17 and found to have necrotic bowel at site of incarcerated hernia. On zosyn for intraabdominal coverage, BC NGTD. Patient oversedated on 5/18 and required BIPAP briefly for CO2 retention. Subsequently placed on precedex for agitation and IV tylenol for pain control. Patient now off precedex and tolerating QHS seroquel. Anemia stable, advanced to regular diet, on 1L NC. Patient reporting left lower extremity pain and weakness during this admission. Lower extremity xrays and ultrasounds negative for acute pathology. Most likely related to PAD however, will need outpatient neuro consultation for EMG to rule out alternative etiology.     Patient was stepped down to medicine 5/22 after reporting 2 normal non-bloody bowel movements with negative abdominal exam. On 5/23 the patient reported worsening abdominal pain and distension. She experienced a dark tarry stool over night, as well has worsening reflux. Daughter notes that patient started spitting up small volume green emesis, which progressed over several hours to large volume dark emesis. KUB was obtained converning for ileus vs partial ABO, at which point surgery was notified. NG tube was inserted and placed to low intermittent suction, with likely liters removed since insertion. She also had episode of dark stool. On examination patient was on 5 L NC and asymptomatic. She stepped back up to MICU. Hgb stable, gen surg still following.     Interval History/Significant Events: Ms. Floyd is doing well this morning. Hgb stable. Gen surg following and recommend GI consult if Hgb trends down. Continue NPO and IVF resuscitation for NG output. Restart Zosyn for leukocytosis. PT/OT.     Review of Systems   Constitutional:  Negative for chills and fever.   Respiratory:  Negative for cough.    Cardiovascular:  Negative for  chest pain and leg swelling.   Gastrointestinal:  Negative for abdominal pain, nausea and vomiting.     Objective:     Vital Signs (Most Recent):  Temp: 98.4 °F (36.9 °C) (05/24/24 0700)  Pulse: 87 (05/24/24 0900)  Resp: (!) 34 (05/24/24 0900)  BP: (!) 155/75 (05/24/24 0900)  SpO2: 100 % (05/24/24 0900) Vital Signs (24h Range):  Temp:  [98.4 °F (36.9 °C)-99.3 °F (37.4 °C)] 98.4 °F (36.9 °C)  Pulse:  [] 87  Resp:  [20-54] 34  SpO2:  [89 %-100 %] 100 %  BP: (103-155)/(54-75) 155/75   Weight: 52.6 kg (115 lb 15.4 oz)  Body mass index is 20.54 kg/m².      Intake/Output Summary (Last 24 hours) at 5/24/2024 1240  Last data filed at 5/24/2024 0645  Gross per 24 hour   Intake 1980.43 ml   Output 10 ml   Net 1970.43 ml          Physical Exam  Vitals and nursing note reviewed.   Constitutional:       General: She is not in acute distress.  HENT:      Nose: No congestion or rhinorrhea.      Comments: NG tube in place with green output     Mouth/Throat:      Mouth: Mucous membranes are dry.   Eyes:      General:         Right eye: No discharge.         Left eye: No discharge.   Cardiovascular:      Rate and Rhythm: Regular rhythm. Tachycardia present.      Pulses: Normal pulses.      Heart sounds: No murmur heard.  Pulmonary:      Effort: Pulmonary effort is normal. No respiratory distress.      Breath sounds: Normal breath sounds. No wheezing.   Abdominal:      General: Abdomen is flat. There is no distension.      Palpations: Abdomen is soft.      Tenderness: There is no abdominal tenderness.   Musculoskeletal:      Cervical back: No rigidity.      Right lower leg: No edema.      Left lower leg: No edema.   Neurological:      General: No focal deficit present.      Mental Status: She is alert and oriented to person, place, and time.   Psychiatric:         Mood and Affect: Mood normal.         Behavior: Behavior normal.            Vents:  Oxygen Concentration (%): 0.5 (05/22/24 1315)  Lines/Drains/Airways       Drain   Duration                  NG/OG Tube 05/23/24 0930 16 Fr. Right nostril 1 day              Peripheral Intravenous Line  Duration                  Peripheral IV - Single Lumen 05/18/24 0715 20 G;1 3/4 in Anterior;Right Forearm 6 days         Peripheral IV - Single Lumen 05/18/24 0900 20 G;1 3/4 in Anterior;Left Upper Arm 6 days                  Significant Labs:    CBC/Anemia Profile:  Recent Labs   Lab 05/23/24  1543 05/23/24 2008 05/24/24  0823   WBC 25.75* 25.87* 18.82*   HGB 9.1* 8.2* 8.2*   HCT 30.0* 25.9* 26.7*    283 302   MCV 97 97 98   RDW 17.8* 18.0* 18.1*        Chemistries:  Recent Labs   Lab 05/23/24  0452 05/24/24  0250    143   K 3.7 3.2*    109   CO2 27 25   BUN 11 10   CREATININE 0.9 0.7   CALCIUM 8.3* 7.3*   ALBUMIN 1.9* 1.5*   PROT 5.6* 4.5*   BILITOT 0.6 0.4   ALKPHOS 75 63   ALT 50* 35   AST 64* 46*   MG 2.2 1.8   PHOS 4.3 3.0       BMP:   Recent Labs   Lab 05/24/24  0250   GLU 92      K 3.2*      CO2 25   BUN 10   CREATININE 0.7   CALCIUM 7.3*   MG 1.8     CMP:   Recent Labs   Lab 05/23/24  0452 05/24/24  0250    143   K 3.7 3.2*    109   CO2 27 25    92   BUN 11 10   CREATININE 0.9 0.7   CALCIUM 8.3* 7.3*   PROT 5.6* 4.5*   ALBUMIN 1.9* 1.5*   BILITOT 0.6 0.4   ALKPHOS 75 63   AST 64* 46*   ALT 50* 35   ANIONGAP 12 9       Significant Imaging:  I have reviewed all pertinent imaging results/findings within the past 24 hours.    ABG  Recent Labs   Lab 05/20/24  1056   PH 7.490*   PO2 69*   PCO2 26.5*   HCO3 20.2*   BE -3*     Assessment/Plan:     Pulmonary  Pulmonary emphysema  Not on oxygen at home. Restart home inhalers.    Cardiac/Vascular  (HFpEF) heart failure with preserved ejection fraction  Noted in history but last echo reveals normal function. Holding home coreg.    Transthoracic echo (TTE) complete 10/29/2023    Interpretation Summary    Left Ventricle: The left ventricle is normal in size. Normal wall thickness. Normal wall motion.  There is normal systolic function with a visually estimated ejection fraction of 60 - 65%. There is normal diastolic function.    Right Ventricle: Mild right ventricular enlargement. Wall thickness is normal. Right ventricle wall motion  is normal. Systolic function is normal.    Aortic Valve: The aortic valve is a unicuspid valve. There is mild aortic valve sclerosis. There is trace aortic regurgitation.    Mitral Valve: There is mild regurgitation.    Pulmonary Artery: There is mild pulmonary hypertension. The estimated pulmonary artery systolic pressure is 40 mmHg.    IVC/SVC: Intermediate venous pressure at 8 mmHg.        Hypertension  Hypertension Medications                    amLODIPine (NORVASC) 5 MG tablet TAKE 1 TABLET BY MOUTH EVERY DAY     carvediloL (COREG) 6.25 MG tablet Take 1 tablet (6.25 mg total) by mouth 2 (two) times daily with meals.     hydroCHLOROthiazide (HYDRODIURIL) 25 MG tablet Take 1 tablet (25 mg total) by mouth once daily.       -Will restart home meds as tolerated    Bilateral carotid artery stenosis  April 2022     No evidence of hemodynamically significant stenosis is demonstrated in the extracranial carotid arteries.  Patent right carotid stent    Renal/  Hypernatremia  In the setting of decreased PO intake for multiple days, 1.5L deficit. Received gentle IVF, now advanced to CLD. Will prioritize PO intake. Currently improving.    Oncology  Acute blood loss anemia  See acute lower GIB    Leukocytosis  Leukocytosis on admission, initially thought to be acute phase reactant to GIB as patient afebrile. S/p 5 days IV Zosyn to cover for intraabdominal infection. Leukocytosis jumped from 13 to 25 after new ileus noted on abdominal imaging.     - Restart Zosyn for empiric intraabdominal coverage    GI  * Acute lower GI bleeding  73 year old female with multiple medical problems presenting with melena and some bright red blood with associated pre-syncope found to have hgb of 5.9 (from  baseline ~10). CTA with acute gastrointestinal bleed at the level of the cecum. Patient had embolization by IR (5/14) but continued to to have larger volume dark red blood in stool with falling hgb, repeat CTA without active bleed. S/p 9U pRBCs, 1 FFP, 1 platelet. Colonoscopy performed 5/16 with no evidence of active bleed, old blood and clots evacuated. Tolerated advanced diet and stepped down on 5/22. Patient stepped back up to MICU 5/23 after ileus noted on KUB, NG tube placed, concern for GIB with dark stools however Hgb remained stable. Dark NG tube output, likely bilious as opposed to blood.    -- diet NPO  -- Protonix 40 mg IV BID  -- Hold all A/C and A/P agents, on only DVT prophylaxis  -- If Hgb downtrends, will reach out to GI  -- Correct any coagulopathy with platelets and FFP for goal of platelets > 50K and INR <2.0   -- Maintain large bore IV access   -- MAP > 65 goal   -- Maintain type and screen   -- Trend CBCs    Incarcerated inguinal hernia  See bowel ischemia    Ischemia, bowel  S/p ex lap 5/17 with incarcerated hernia repair with bowel resection per general surgery    Ileus  Noted on CT A/P. Previously thought to have resolved. KUB concerning for continued ileus vs pSBO. S/p NGT with significant output.    -NGT to LIWS  -IVF to match output  -NPO    Orthopedic  Left leg pain  Patient reporting left left pain and weakness associated with pain. Patient has known severe atherosclerosis which may be associated with pain/PAD. Patient has remote history of left hip fracture. Lower extremity ultrasounds and L spine, pelvis, left hip, left femur, left knee, left tib/fib xrays unrevealing.    - PT/OT  - PT recommending rehab, PM&R consulted  - Lidocaine lotion PRN  - Neurology consultation at discharge for EMG to rule out nerve damage    Other  History Situational (ie Stress Induced) syncope ([facility] admission 12-25-23  History of syncope. Thought to be reflex mediated. Had pre-syncopal episode at home  after having diarrhea but before she noticed active bleeding.     Acute hypoxic on chronic hypercapnic respiratory failure  Patient with Hypercapnic and Hypoxic Respiratory failure which is Acute.  she is on home oxygen at 2 LPM. Signs/symptoms of respiratory failure include- respiratory distress. Contributing diagnoses includes - COPD and oversedation.  Labs and images were reviewed. Patient Has recent ABG, which has been reviewed. Will treat underlying causes and adjust management of respiratory failure as follows: Patient required intermittent short term BIPAP, now satting well on low flow O2 NC.    - Home inhalers  - IS    Impaired mobility  See left leg pain       Critical Care Daily Checklist:    A: Awake: RASS Goal/Actual Goal: RASS Goal: 0-->alert and calm  Actual:     B: Spontaneous Breathing Trial Performed?     C: SAT & SBT Coordinated?  yes                      D: Delirium: CAM-ICU Overall CAM-ICU: Negative   E: Early Mobility Performed? Yes   F: Feeding Goal:    Status:     Current Diet Order   Procedures    Diet NPO Except for: Ice Chips, Sips with Medication, Medication     Order Specific Question:   Except for     Answer:   Ice Chips     Order Specific Question:   Except for     Answer:   Sips with Medication     Order Specific Question:   Except for     Answer:   Medication      AS: Analgesia/Sedation na   T: Thromboembolic Prophylaxis GIB   H: HOB > 300 Yes   U: Stress Ulcer Prophylaxis (if needed) PPI   G: Glucose Control na   B: Bowel Function Stool Occurrence: 2   I: Indwelling Catheter (Lines & Maldonado) Necessity PIV   D: De-escalation of Antimicrobials/Pharmacotherapies Zosyn    Plan for the day/ETD monitor    Code Status:  Family/Goals of Care: Full Code        Critical care was time spent personally by me on the following activities: development of treatment plan with patient or surrogate and bedside caregivers, discussions with consultants, evaluation of patient's response to treatment,  examination of patient, ordering and performing treatments and interventions, ordering and review of laboratory studies, ordering and review of radiographic studies, pulse oximetry, re-evaluation of patient's condition. This critical care time did not overlap with that of any other provider or involve time for any procedures.     Alesia Archer MD  Critical Care Medicine  [facility] - Cardiac Medical [facility]

## 2024-05-24 NOTE — ASSESSMENT & PLAN NOTE
Patient reporting left left pain and weakness associated with pain. Patient has known severe atherosclerosis which may be associated with pain/PAD. Patient has remote history of left hip fracture. Lower extremity ultrasounds and L spine, pelvis, left hip, left femur, left knee, left tib/fib xrays unrevealing.    - PT/OT  - PT recommending rehab, PM&R consulted  - Lidocaine lotion PRN  - Neurology consultation at discharge for EMG to rule out nerve damage

## 2024-05-24 NOTE — ASSESSMENT & PLAN NOTE
Noted on CT A/P. Previously thought to have resolved. KUB concerning for continued ileus vs pSBO. S/p NGT with significant output.    -NGT to LIWS  -IVF to match output  -NPO

## 2024-05-24 NOTE — SUBJECTIVE & OBJECTIVE
Interval History/Significant Events: Ms. Floyd is doing well this morning. Hgb stable. Gen surg following and recommend GI consult if Hgb trends down. Continue NPO and IVF resuscitation for NG output. Restart Zosyn for leukocytosis. PT/OT.     Review of Systems   Constitutional:  Negative for chills and fever.   Respiratory:  Negative for cough.    Cardiovascular:  Negative for chest pain and leg swelling.   Gastrointestinal:  Negative for abdominal pain, nausea and vomiting.     Objective:     Vital Signs (Most Recent):  Temp: 98.4 °F (36.9 °C) (05/24/24 0700)  Pulse: 87 (05/24/24 0900)  Resp: (!) 34 (05/24/24 0900)  BP: (!) 155/75 (05/24/24 0900)  SpO2: 100 % (05/24/24 0900) Vital Signs (24h Range):  Temp:  [98.4 °F (36.9 °C)-99.3 °F (37.4 °C)] 98.4 °F (36.9 °C)  Pulse:  [] 87  Resp:  [20-54] 34  SpO2:  [89 %-100 %] 100 %  BP: (103-155)/(54-75) 155/75   Weight: 52.6 kg (115 lb 15.4 oz)  Body mass index is 20.54 kg/m².      Intake/Output Summary (Last 24 hours) at 5/24/2024 1240  Last data filed at 5/24/2024 0645  Gross per 24 hour   Intake 1980.43 ml   Output 10 ml   Net 1970.43 ml          Physical Exam  Vitals and nursing note reviewed.   Constitutional:       General: She is not in acute distress.  HENT:      Nose: No congestion or rhinorrhea.      Comments: NG tube in place with green output     Mouth/Throat:      Mouth: Mucous membranes are dry.   Eyes:      General:         Right eye: No discharge.         Left eye: No discharge.   Cardiovascular:      Rate and Rhythm: Regular rhythm. Tachycardia present.      Pulses: Normal pulses.      Heart sounds: No murmur heard.  Pulmonary:      Effort: Pulmonary effort is normal. No respiratory distress.      Breath sounds: Normal breath sounds. No wheezing.   Abdominal:      General: Abdomen is flat. There is no distension.      Palpations: Abdomen is soft.      Tenderness: There is no abdominal tenderness.   Musculoskeletal:      Cervical back: No rigidity.       Right lower leg: No edema.      Left lower leg: No edema.   Neurological:      General: No focal deficit present.      Mental Status: She is alert and oriented to person, place, and time.   Psychiatric:         Mood and Affect: Mood normal.         Behavior: Behavior normal.            Vents:  Oxygen Concentration (%): 0.5 (05/22/24 1315)  Lines/Drains/Airways       Drain  Duration                  NG/OG Tube 05/23/24 0930 16 Fr. Right nostril 1 day              Peripheral Intravenous Line  Duration                  Peripheral IV - Single Lumen 05/18/24 0715 20 G;1 3/4 in Anterior;Right Forearm 6 days         Peripheral IV - Single Lumen 05/18/24 0900 20 G;1 3/4 in Anterior;Left Upper Arm 6 days                  Significant Labs:    CBC/Anemia Profile:  Recent Labs   Lab 05/23/24  1543 05/23/24 2008 05/24/24  0823   WBC 25.75* 25.87* 18.82*   HGB 9.1* 8.2* 8.2*   HCT 30.0* 25.9* 26.7*    283 302   MCV 97 97 98   RDW 17.8* 18.0* 18.1*        Chemistries:  Recent Labs   Lab 05/23/24  0452 05/24/24  0250    143   K 3.7 3.2*    109   CO2 27 25   BUN 11 10   CREATININE 0.9 0.7   CALCIUM 8.3* 7.3*   ALBUMIN 1.9* 1.5*   PROT 5.6* 4.5*   BILITOT 0.6 0.4   ALKPHOS 75 63   ALT 50* 35   AST 64* 46*   MG 2.2 1.8   PHOS 4.3 3.0       BMP:   Recent Labs   Lab 05/24/24  0250   GLU 92      K 3.2*      CO2 25   BUN 10   CREATININE 0.7   CALCIUM 7.3*   MG 1.8     CMP:   Recent Labs   Lab 05/23/24  0452 05/24/24  0250    143   K 3.7 3.2*    109   CO2 27 25    92   BUN 11 10   CREATININE 0.9 0.7   CALCIUM 8.3* 7.3*   PROT 5.6* 4.5*   ALBUMIN 1.9* 1.5*   BILITOT 0.6 0.4   ALKPHOS 75 63   AST 64* 46*   ALT 50* 35   ANIONGAP 12 9       Significant Imaging:  I have reviewed all pertinent imaging results/findings within the past 24 hours.

## 2024-05-25 LAB
ALBUMIN SERPL BCP-MCNC: 1.5 G/DL (ref 3.5–5.2)
ALP SERPL-CCNC: 65 U/L (ref 55–135)
ALT SERPL W/O P-5'-P-CCNC: 36 U/L (ref 10–44)
ANION GAP SERPL CALC-SCNC: 8 MMOL/L (ref 8–16)
AST SERPL-CCNC: 52 U/L (ref 10–40)
BASO STIPL BLD QL SMEAR: ABNORMAL
BASOPHILS # BLD AUTO: 0.16 K/UL (ref 0–0.2)
BASOPHILS # BLD AUTO: 0.22 K/UL (ref 0–0.2)
BASOPHILS NFR BLD: 1.2 % (ref 0–1.9)
BASOPHILS NFR BLD: 1.7 % (ref 0–1.9)
BILIRUB SERPL-MCNC: 0.4 MG/DL (ref 0.1–1)
BUN SERPL-MCNC: 7 MG/DL (ref 8–23)
CALCIUM SERPL-MCNC: 7.7 MG/DL (ref 8.7–10.5)
CHLORIDE SERPL-SCNC: 107 MMOL/L (ref 95–110)
CO2 SERPL-SCNC: 24 MMOL/L (ref 23–29)
CREAT SERPL-MCNC: 0.7 MG/DL (ref 0.5–1.4)
DIFFERENTIAL METHOD BLD: ABNORMAL
DIFFERENTIAL METHOD BLD: ABNORMAL
DOHLE BOD BLD QL SMEAR: PRESENT
EOSINOPHIL # BLD AUTO: 0.6 K/UL (ref 0–0.5)
EOSINOPHIL # BLD AUTO: 0.6 K/UL (ref 0–0.5)
EOSINOPHIL NFR BLD: 4.6 % (ref 0–8)
EOSINOPHIL NFR BLD: 4.8 % (ref 0–8)
ERYTHROCYTE [DISTWIDTH] IN BLOOD BY AUTOMATED COUNT: 17.6 % (ref 11.5–14.5)
ERYTHROCYTE [DISTWIDTH] IN BLOOD BY AUTOMATED COUNT: 18 % (ref 11.5–14.5)
EST. GFR  (NO RACE VARIABLE): >60 ML/MIN/1.73 M^2
GLUCOSE SERPL-MCNC: 72 MG/DL (ref 70–110)
HCT VFR BLD AUTO: 26.1 % (ref 37–48.5)
HCT VFR BLD AUTO: 27.3 % (ref 37–48.5)
HGB BLD-MCNC: 8.1 G/DL (ref 12–16)
HGB BLD-MCNC: 8.4 G/DL (ref 12–16)
IMM GRANULOCYTES # BLD AUTO: 0.21 K/UL (ref 0–0.04)
IMM GRANULOCYTES # BLD AUTO: 0.24 K/UL (ref 0–0.04)
IMM GRANULOCYTES NFR BLD AUTO: 1.6 % (ref 0–0.5)
IMM GRANULOCYTES NFR BLD AUTO: 1.8 % (ref 0–0.5)
INR PPP: 1 (ref 0.8–1.2)
LYMPHOCYTES # BLD AUTO: 1.1 K/UL (ref 1–4.8)
LYMPHOCYTES # BLD AUTO: 1.1 K/UL (ref 1–4.8)
LYMPHOCYTES NFR BLD: 8 % (ref 18–48)
LYMPHOCYTES NFR BLD: 8.3 % (ref 18–48)
MAGNESIUM SERPL-MCNC: 2 MG/DL (ref 1.6–2.6)
MCH RBC QN AUTO: 30.1 PG (ref 27–31)
MCH RBC QN AUTO: 30.2 PG (ref 27–31)
MCHC RBC AUTO-ENTMCNC: 30.8 G/DL (ref 32–36)
MCHC RBC AUTO-ENTMCNC: 31 G/DL (ref 32–36)
MCV RBC AUTO: 97 FL (ref 82–98)
MCV RBC AUTO: 98 FL (ref 82–98)
MONOCYTES # BLD AUTO: 2 K/UL (ref 0.3–1)
MONOCYTES # BLD AUTO: 2.1 K/UL (ref 0.3–1)
MONOCYTES NFR BLD: 15.1 % (ref 4–15)
MONOCYTES NFR BLD: 15.7 % (ref 4–15)
NEUTROPHILS # BLD AUTO: 8.8 K/UL (ref 1.8–7.7)
NEUTROPHILS # BLD AUTO: 9.1 K/UL (ref 1.8–7.7)
NEUTROPHILS NFR BLD: 67.9 % (ref 38–73)
NEUTROPHILS NFR BLD: 69.3 % (ref 38–73)
NRBC BLD-RTO: 0 /100 WBC
NRBC BLD-RTO: 0 /100 WBC
PHOSPHATE SERPL-MCNC: 2.5 MG/DL (ref 2.7–4.5)
PLATELET # BLD AUTO: 360 K/UL (ref 150–450)
PLATELET # BLD AUTO: 395 K/UL (ref 150–450)
PLATELET BLD QL SMEAR: ABNORMAL
PMV BLD AUTO: 10.4 FL (ref 9.2–12.9)
PMV BLD AUTO: 10.7 FL (ref 9.2–12.9)
POTASSIUM SERPL-SCNC: 3.4 MMOL/L (ref 3.5–5.1)
PROT SERPL-MCNC: 4.8 G/DL (ref 6–8.4)
PROTHROMBIN TIME: 10.8 SEC (ref 9–12.5)
RBC # BLD AUTO: 2.68 M/UL (ref 4–5.4)
RBC # BLD AUTO: 2.79 M/UL (ref 4–5.4)
SCHISTOCYTES BLD QL SMEAR: ABNORMAL
SODIUM SERPL-SCNC: 139 MMOL/L (ref 136–145)
TOXIC GRANULES BLD QL SMEAR: PRESENT
WBC # BLD AUTO: 13.03 K/UL (ref 3.9–12.7)
WBC # BLD AUTO: 13.14 K/UL (ref 3.9–12.7)
WBC TOXIC VACUOLES BLD QL SMEAR: PRESENT

## 2024-05-25 PROCEDURE — 63600175 PHARM REV CODE 636 W HCPCS: Mod: NTX | Performed by: INTERNAL MEDICINE

## 2024-05-25 PROCEDURE — 25000003 PHARM REV CODE 250: Mod: NTX

## 2024-05-25 PROCEDURE — 80053 COMPREHEN METABOLIC PANEL: CPT | Mod: NTX

## 2024-05-25 PROCEDURE — 85610 PROTHROMBIN TIME: CPT | Mod: NTX

## 2024-05-25 PROCEDURE — 97168 OT RE-EVAL EST PLAN CARE: CPT | Mod: NTX

## 2024-05-25 PROCEDURE — 83735 ASSAY OF MAGNESIUM: CPT | Mod: NTX

## 2024-05-25 PROCEDURE — 25000242 PHARM REV CODE 250 ALT 637 W/ HCPCS: Mod: NTX

## 2024-05-25 PROCEDURE — 99900035 HC TECH TIME PER 15 MIN (STAT): Mod: NTX

## 2024-05-25 PROCEDURE — 94761 N-INVAS EAR/PLS OXIMETRY MLT: CPT | Mod: NTX

## 2024-05-25 PROCEDURE — 94660 CPAP INITIATION&MGMT: CPT | Mod: NTX

## 2024-05-25 PROCEDURE — 94640 AIRWAY INHALATION TREATMENT: CPT | Mod: NTX

## 2024-05-25 PROCEDURE — 84100 ASSAY OF PHOSPHORUS: CPT | Mod: NTX

## 2024-05-25 PROCEDURE — 63600175 PHARM REV CODE 636 W HCPCS: Mod: NTX

## 2024-05-25 PROCEDURE — C9113 INJ PANTOPRAZOLE SODIUM, VIA: HCPCS | Mod: NTX | Performed by: INTERNAL MEDICINE

## 2024-05-25 PROCEDURE — 27000221 HC OXYGEN, UP TO 24 HOURS: Mod: NTX

## 2024-05-25 PROCEDURE — 99233 SBSQ HOSP IP/OBS HIGH 50: CPT | Mod: GC,NTX,, | Performed by: INTERNAL MEDICINE

## 2024-05-25 PROCEDURE — 97530 THERAPEUTIC ACTIVITIES: CPT | Mod: NTX

## 2024-05-25 PROCEDURE — 85025 COMPLETE CBC W/AUTO DIFF WBC: CPT | Mod: 91,NTX

## 2024-05-25 PROCEDURE — 20000000 HC ICU ROOM: Mod: NTX

## 2024-05-25 RX ORDER — SODIUM CHLORIDE, SODIUM LACTATE, POTASSIUM CHLORIDE, CALCIUM CHLORIDE 600; 310; 30; 20 MG/100ML; MG/100ML; MG/100ML; MG/100ML
INJECTION, SOLUTION INTRAVENOUS CONTINUOUS
Status: ACTIVE | OUTPATIENT
Start: 2024-05-25 | End: 2024-05-25

## 2024-05-25 RX ORDER — LORAZEPAM 2 MG/ML
0.5 INJECTION INTRAMUSCULAR ONCE
Status: COMPLETED | OUTPATIENT
Start: 2024-05-25 | End: 2024-05-25

## 2024-05-25 RX ORDER — POTASSIUM CHLORIDE 7.45 MG/ML
10 INJECTION INTRAVENOUS
Status: DISPENSED | OUTPATIENT
Start: 2024-05-25 | End: 2024-05-25

## 2024-05-25 RX ORDER — DICLOFENAC SODIUM 10 MG/G
2 GEL TOPICAL 2 TIMES DAILY
Status: DISCONTINUED | OUTPATIENT
Start: 2024-05-25 | End: 2024-06-03 | Stop reason: HOSPADM

## 2024-05-25 RX ORDER — AMLODIPINE BESYLATE 5 MG/1
5 TABLET ORAL DAILY
Status: DISCONTINUED | OUTPATIENT
Start: 2024-05-25 | End: 2024-06-03 | Stop reason: HOSPADM

## 2024-05-25 RX ORDER — QUETIAPINE FUMARATE 25 MG/1
50 TABLET, FILM COATED ORAL NIGHTLY
Status: DISCONTINUED | OUTPATIENT
Start: 2024-05-25 | End: 2024-06-03 | Stop reason: HOSPADM

## 2024-05-25 RX ADMIN — LORAZEPAM 0.5 MG: 2 INJECTION INTRAMUSCULAR at 09:05

## 2024-05-25 RX ADMIN — PANTOPRAZOLE SODIUM 40 MG: 40 INJECTION, POWDER, FOR SOLUTION INTRAVENOUS at 08:05

## 2024-05-25 RX ADMIN — POTASSIUM CHLORIDE 10 MEQ: 7.46 INJECTION, SOLUTION INTRAVENOUS at 05:05

## 2024-05-25 RX ADMIN — ACETAMINOPHEN 789 MG: 10 INJECTION, SOLUTION INTRAVENOUS at 07:05

## 2024-05-25 RX ADMIN — DICLOFENAC SODIUM 2 G: 10 GEL TOPICAL at 08:05

## 2024-05-25 RX ADMIN — DICLOFENAC SODIUM 2 G: 10 GEL TOPICAL at 11:05

## 2024-05-25 RX ADMIN — SODIUM CHLORIDE, POTASSIUM CHLORIDE, SODIUM LACTATE AND CALCIUM CHLORIDE: 600; 310; 30; 20 INJECTION, SOLUTION INTRAVENOUS at 05:05

## 2024-05-25 RX ADMIN — FLUTICASONE FUROATE AND VILANTEROL TRIFENATATE 1 PUFF: 100; 25 POWDER RESPIRATORY (INHALATION) at 07:05

## 2024-05-25 RX ADMIN — LORAZEPAM 0.5 MG: 2 INJECTION INTRAMUSCULAR; INTRAVENOUS at 09:05

## 2024-05-25 RX ADMIN — ACETAMINOPHEN 789 MG: 10 INJECTION, SOLUTION INTRAVENOUS at 04:05

## 2024-05-25 RX ADMIN — PIPERACILLIN SODIUM AND TAZOBACTAM SODIUM 4.5 G: 4; .5 INJECTION, POWDER, FOR SOLUTION INTRAVENOUS at 03:05

## 2024-05-25 RX ADMIN — POTASSIUM CHLORIDE 10 MEQ: 7.46 INJECTION, SOLUTION INTRAVENOUS at 07:05

## 2024-05-25 RX ADMIN — POTASSIUM CHLORIDE 10 MEQ: 7.46 INJECTION, SOLUTION INTRAVENOUS at 06:05

## 2024-05-25 RX ADMIN — QUETIAPINE FUMARATE 50 MG: 25 TABLET ORAL at 08:05

## 2024-05-25 RX ADMIN — PIPERACILLIN SODIUM AND TAZOBACTAM SODIUM 4.5 G: 4; .5 INJECTION, POWDER, FOR SOLUTION INTRAVENOUS at 10:05

## 2024-05-25 RX ADMIN — AMLODIPINE BESYLATE 5 MG: 5 TABLET ORAL at 08:05

## 2024-05-25 RX ADMIN — SODIUM CHLORIDE, POTASSIUM CHLORIDE, SODIUM LACTATE AND CALCIUM CHLORIDE: 600; 310; 30; 20 INJECTION, SOLUTION INTRAVENOUS at 06:05

## 2024-05-25 RX ADMIN — ACETAMINOPHEN 789 MG: 10 INJECTION, SOLUTION INTRAVENOUS at 12:05

## 2024-05-25 RX ADMIN — PANTOPRAZOLE SODIUM 40 MG: 40 INJECTION, POWDER, FOR SOLUTION INTRAVENOUS at 09:05

## 2024-05-25 RX ADMIN — POTASSIUM CHLORIDE 10 MEQ: 7.46 INJECTION, SOLUTION INTRAVENOUS at 09:05

## 2024-05-25 RX ADMIN — SODIUM CHLORIDE, POTASSIUM CHLORIDE, SODIUM LACTATE AND CALCIUM CHLORIDE: 600; 310; 30; 20 INJECTION, SOLUTION INTRAVENOUS at 09:05

## 2024-05-25 RX ADMIN — TIOTROPIUM BROMIDE INHALATION SPRAY 2 PUFF: 3.12 SPRAY, METERED RESPIRATORY (INHALATION) at 07:05

## 2024-05-25 RX ADMIN — POTASSIUM PHOSPHATE, MONOBASIC AND POTASSIUM PHOSPHATE, DIBASIC 20 MMOL: 224; 236 INJECTION, SOLUTION, CONCENTRATE INTRAVENOUS at 07:05

## 2024-05-25 RX ADMIN — PIPERACILLIN SODIUM AND TAZOBACTAM SODIUM 4.5 G: 4; .5 INJECTION, POWDER, FOR SOLUTION INTRAVENOUS at 06:05

## 2024-05-25 NOTE — PT/OT/SLP RE-EVAL
Occupational Therapy   Re-evaluation/tx    Name: Jessica Floyd  MRN: [id number]  Admitting Diagnosis:  Acute lower GI bleeding  Recent Surgery: Procedure(s) (LRB):  LAPAROSCOPY, DIAGNOSTIC (N/A)  LAPAROTOMY, EXPLORATORY (N/A)  EXCISION, SMALL INTESTINE (N/A)  REPAIR, HERNIA, INGUINAL (Bilateral) 8 Days Post-Op    Tech: Phyllis  Recommendations:     Discharge Recommendations: High Intensity Therapy  Discharge Equipment Recommendations: none  Barriers to discharge:  Other (Comment) (requires increased assist)    Assessment:     Jessica Floyd is a 73 y.o. female with a medical diagnosis of Acute lower GI bleeding.  She presents with increased anxiety noted with all mobility on this date that did improve during session.. Pt. Required assist for transfer to chair as well as sit to stand trials from bedside chair. Pt. Required reassurance of therapist assist for transfer and proper technique. Pt. Is significantly below PLOF. Patient would benefit from continued OT services to maximize level of safety and independence with self-care tasks.   .  Performance deficits affecting function are weakness, impaired endurance, impaired self care skills, impaired functional mobility, gait instability, impaired balance.      Rehab Prognosis:  good; patient would benefit from acute skilled OT services to address these deficits and reach maximum level of function.       Plan:     Patient to be seen 4 x/week to address the above listed problems via self-care/home management, therapeutic activities, therapeutic exercises  Plan of Care Expires: 06/19/24  Plan of Care Reviewed with: patient, daughter    Subjective     Chief Complaint: Pt. Reporting she needed 1 person to hold under both of her arms for the transfer and needed transfer done her way to feel safe  Patient/Family stated goals: to get better  Communicated with: nurse prior to session.  Pain/Comfort:  Pain Rating 1: 0/10  Pain Rating Post-Intervention 1: 0/10    Objective:      Communicated with: nurse prior to session.  Patient found sitting edge of bed with: telemetry, pulse ox (continuous), blood pressure cuff, peripheral IV upon OT entry to room.2 daughters present    General Precautions: Standard, fall, NPO  Orthopedic Precautions: N/A  Braces: N/A  Respiratory Status: Nasal cannula,     Occupational Performance:    Bed Mobility:    Not tested    Functional Mobility/Transfers:  Patient completed Sit <> Stand Transfer with minimum assistance and of 2 persons  with  rolling walker   Patient completed Bed <> Chair Transfer using Step Transfer technique with minimum assistance and of 2 persons with rolling walker pt. Became very anxious during transfer to chair and requested male nurse to assist pt. From behind   Patient completed Toilet Transfer sit to stand from bedside chair with Min A and RW x 2 per pt. Request and then recliner backed away and BSC put in place for pt. Use.   Functional Mobility: Pt. Performed step transfer to bedside chair with Min A x 2 with RW (second person per pt. Request for safety and due to increased anxiety)    Activities of Daily Living:  Upper Body Dressing: minimum assistance to don fresh gown forward seated in chair    Cognitive/Visual Perceptual:  Cognitive/Psychosocial Skills:     -       Oriented to: Person, Place, and Situation   -       Follows Commands/attention:Follows multistep  commands  -       Communication: clear/fluent  -       Memory: No Deficits noted  -       Safety awareness/insight to disability: intact   -       Mood/Affect/Coping skills/emotional control: Anxious  Visual/Perceptual:      -not  tested    Physical Exam:  Balance: -       sit: good; stand Min A x 2   Postural examination/scapula alignment:    -       Rounded shoulders  -       Posterior pelvic tilt  Skin integrity: Visible skin intact   Strength:    -       Right Upper Extremity: WFL  -       Left Upper Extremity: WFL    AMPA 6 Click:  [facility] Total Score:  17    Treatment & Education:  Pt. Performed sit to stand from bedside chair x 3 trials with use of RW and Min A x 2 as well as safety belt in place. Pt educated on proper technique to perform sit to stand transfers    Patient left  seated on bedside commode  with  nurse notified and family present and all lines in tact    GOALS:   Multidisciplinary Problems       Occupational Therapy Goals          Problem: Occupational Therapy    Goal Priority Disciplines Outcome Interventions   Occupational Therapy Goal     OT, PT/OT Progressing    Description: Re-eval completed 05-25 Goals to be met by: 6/8/24     Patient will increase functional independence with ADLs by performing:    UE Dressing with Stand-by Assistance.  LE Dressing with Minimal Assistance.  Grooming while standing at sink with stand by Assistance.  Toileting from toilet with Minimal Assistance for hygiene and clothing management.   Toilet transfer to toilet with stand by assist                         History:     Past Medical History:   Diagnosis Date    Allergic rhinitis     Amblyopia     Carotid stenosis     Coronary atherosclerosis     Outside [facility] 6/2014: 20% mLAD, 50% mCx, 20%, mRCA    Dyslipidemia     ESBL (extended spectrum beta-lactamase) producing bacteria infection     UTI    Hypertension     Nausea and vomiting 05/26/2017    Pulmonary emphysema 10/28/2023    SAH (subarachnoid hemorrhage) 08/24/2016 1999, s/p clipping    Subarachnoid hemorrhage due to ruptured aneurysm 1999         Past Surgical History:   Procedure Laterality Date    ADENOIDECTOMY      ANTERIOR CRUCIATE LIGAMENT REPAIR  2012    APPENDECTOMY      CATARACT EXTRACTION W/  INTRAOCULAR LENS IMPLANT Right 11/07/2022    Procedure: EXTRACTION, CATARACT, WITH IOL INSERTION;  Surgeon: Higinio Cristina MD;  Location: [facility];  Service: Ophthalmology;  Laterality: Right;    CATARACT EXTRACTION W/  INTRAOCULAR LENS IMPLANT Left 11/21/2022    Procedure: EXTRACTION, CATARACT, WITH IOL  INSERTION;  Surgeon: Higinio Cristina MD;  Location: [facility];  Service: Ophthalmology;  Laterality: Left;    CEREBRAL ANEURYSM REPAIR  1999    clip    COLONOSCOPY N/A 5/16/2024    Procedure: COLONOSCOPY;  Surgeon: Rich Syed MD;  Location: [facility] ENDO (2ND FLR);  Service: Endoscopy;  Laterality: N/A;    DENTAL SURGERY      DIAGNOSTIC LAPAROSCOPY N/A 5/17/2024    Procedure: LAPAROSCOPY, DIAGNOSTIC;  Surgeon: Ruben Oscar MD;  [address];  Service: General;  Laterality: N/A;    ESOPHAGOGASTRODUODENOSCOPY N/A 5/15/2024    Procedure: EGD (ESOPHAGOGASTRODUODENOSCOPY);  Surgeon: Rich Syed MD;  [facility]  Service: Endoscopy;  Laterality: N/A;    EXCISION, SMALL INTESTINE N/A 5/17/2024    Procedure: EXCISION, SMALL INTESTINE;  Surgeon: Ruben Oscar MD;  [address];  Service: General;  Laterality: N/A;    EYE SURGERY Bilateral     cataract removal and lens implants    HIP ARTHROPLASTY Left 05/28/2023    Procedure: TOTAL HIP ARTHROPLASTY;  Surgeon: Juan Wan MD;  [address];  Service: Orthopedics;  Laterality: Left;    LAPAROTOMY, EXPLORATORY N/A 5/17/2024    Procedure: LAPAROTOMY, EXPLORATORY;  Surgeon: Ruben Oscar MD;  Location: [address];  Service: General;  Laterality: N/A;    REPAIR, HERNIA, INGUINAL Bilateral 5/17/2024    Procedure: REPAIR, HERNIA, INGUINAL;  Surgeon: Ruben Oscar MD;  [address];  Service: General;  Laterality: Bilateral;    TONSILLECTOMY         Time Tracking:     OT Date of Treatment: 05/25/24  OT Start Time: 1025  OT Stop Time: 1056  OT Total Time (min): 31 min    Billable Minutes:Self Care/Home Management 10  Therapeutic Activity 21    5/25/2024

## 2024-05-25 NOTE — ASSESSMENT & PLAN NOTE
Patient unable to take gout prophylactic home meds while NPO. Experiencing gout flare 5/25, left foot. NSAIDs contraindicated with GIB. Avoiding opioids in setting of ileus. No IV uric acid reducing medications on formulary.     - Voltaren gel   - Ice   - IV steroids if absolutely necessary

## 2024-05-25 NOTE — PLAN OF CARE
MICU DAILY GOALS     Family/Goals of care/Code Status   Code Status: Full Code    24H Vital Sign Range  Temp:  [97.8 °F (36.6 °C)-98.8 °F (37.1 °C)]   Pulse:  [75-99]   Resp:  [12-54]   BP: (110-155)/(59-87)   SpO2:  [83 %-100 %]      Shift Events (include procedures and significant events)  No acute events throughout shift. No c/o abd pain, no evidence of bleeding. NGT with ~ 500 ccs of dark bilious output. Hgb stable.    Given IV tylenol for headache and bilateral (L>R) leg and ankle pains.  Potassium replaced.    AWAKE RASS: Goal - RASS Goal: 0-->alert and calm  Actual - RASS ([place] Agitation-Sedation Scale): alert and calm    Restraint necessity: Not necessary   BREATHE SBT: Not intubated    Coordinate A & B, analgesics/sedatives Pain: managed   SAT: Not intubated   Delirium CAM-ICU: Overall CAM-ICU: Negative   Early(intubated/ Progressive (non-intubated) Mobility MOVE Screen (INTUBATED ONLY): Not intubated    Activity: Activity Management: Rolling - L1, Arm raise - L1, Ankle pumps - L1, Leg kicks - L2   Feeding/Nutrition Diet order: Diet/Nutrition Received: NPO, ice chips, sips of water,     Thrombus DVT prophylaxis: VTE Required Core Measure: Pharmacological prophylaxis initiated/maintained   HOB Elevation Head of Bed (HOB) Positioning: HOB at 30-45 degrees   Ulcer Prophylaxis GI: yes   Glucose control managed     Skin Skin assessed during: Daily Assessment    Sacrum intact/not altered? No  Heels intact/not altered? Yes  Surgical wound? Yes    CHECK ONE!   (no altered skin or altered skin) and sub boxes:  [] No Altered Skin Integrity Present    []Prevention Measures Documented    [x] Altered Skin Integrity Present or Discovered   [x] LDA present in EPIC, daily doc completed              [] LDA added if not in EPIC (describe wound).                    When describing wound, do not stage, use descriptive words only.    [] Wound Image Taken (required on admit,                   transfer/discharge and every  Tuesday)    Wound Care Consulted? Yes    4 EYES:  Attending Nurse (1st set of eyes): Suraj Arreola RN    Second RN/Staff Member (2nd set of eyes): Mimi Patrick RN   Bowel Function no issues    Indwelling Catheter Necessity [REMOVED]      Urethral Catheter 05/15/24 1600-Reason for Continuing Urinary Catheterization: Critically ill in ICU and requiring hourly monitoring of intake/output       De-escalation Antibiotics No        VS and assessment per flow sheet, patient progressing towards goals as tolerated, plan of care reviewed with [unfilled] and family, all concerns addressed at this time.    Suraj Arreola RN

## 2024-05-25 NOTE — ASSESSMENT & PLAN NOTE
73 year old female with multiple medical problems presenting with melena and some bright red blood with associated pre-syncope found to have hgb of 5.9 (from baseline ~10). CTA with acute gastrointestinal bleed at the level of the cecum. Patient had embolization by IR (5/14) but continued to to have larger volume dark red blood in stool with falling hgb, repeat CTA without active bleed. S/p 9U pRBCs, 1 FFP, 1 platelet. Colonoscopy performed 5/16 with no evidence of active bleed, old blood and clots evacuated. Tolerated advanced diet and stepped down on 5/22. Patient stepped back up to MICU 5/23 after ileus noted on KUB, NG tube placed, concern for GIB with dark stools however Hgb remained stable. Dark NG tube output, likely bilious as opposed to blood.    -- diet NPO except ice chips  -- NG tube removed  -- Hgb stable, if down-trends will reach out to GI  -- Protonix 40 mg IV BID  -- Hold all A/C and A/P agents, on only DVT prophylaxis  -- Correct any coagulopathy with platelets and FFP for goal of platelets > 50K and INR <2.0   -- Maintain large bore IV access   -- MAP > 65 goal   -- Maintain type and screen   -- Trend CBCs

## 2024-05-25 NOTE — ASSESSMENT & PLAN NOTE
Leukocytosis on admission, initially thought to be acute phase reactant to GIB as patient afebrile. S/p 5 days IV Zosyn to cover for intraabdominal infection. Leukocytosis jumped from 13 to 25 after new ileus noted on abdominal imaging.     - Restart Zosyn for empiric intraabdominal coverage - day 2  - Leukocytosis down trending

## 2024-05-25 NOTE — PLAN OF CARE
MICU DAILY GOALS     Family/Goals of care/Code Status   Code Status: Full Code    24H Vital Sign Range  Temp:  [97.3 °F (36.3 °C)-97.8 °F (36.6 °C)]   Pulse:  []   Resp:  [12-45]   BP: (110-176)/()   SpO2:  [94 %-100 %]      Shift Events (include procedures and significant events)   No acute events throughout shift    AWAKE RASS: Goal - RASS Goal: 0-->alert and calm  Actual - RASS ([place] Agitation-Sedation Scale): alert and calm    Restraint necessity: Not necessary   BREATHE SBT: Not intubated    Coordinate A & B, analgesics/sedatives Pain: managed   SAT: Not intubated   Delirium CAM-ICU: Overall CAM-ICU: Negative   Early(intubated/ Progressive (non-intubated) Mobility MOVE Screen (INTUBATED ONLY): Not intubated    Activity: Activity Management: Leg kicks - L2   Feeding/Nutrition Diet order: Diet/Nutrition Received: NPO, ice chips,     Thrombus DVT prophylaxis: VTE Required Core Measure: Pharmacological prophylaxis initiated/maintained   HOB Elevation Head of Bed (HOB) Positioning: HOB elevated   Ulcer Prophylaxis GI: yes   Glucose control managed     Skin Skin assessed during: Daily Assessment    Sacrum intact/not altered? Yes  Heels intact/not altered? Yes  Surgical wound? Yes    CHECK ONE!   (no altered skin or altered skin) and sub boxes:  [] No Altered Skin Integrity Present    []Prevention Measures Documented    [x] Altered Skin Integrity Present or Discovered   [x] LDA present in EPIC, daily doc completed              [] LDA added if not in EPIC (describe wound).                    When describing wound, do not stage, use descriptive words only.    [] Wound Image Taken (required on admit,                   transfer/discharge and every Tuesday)    Wound Care Consulted? Yes    4 EYES:  Attending Nurse (1st set of eyes): BARRY Fox    Second RN/Staff Member (2nd set of eyes): KACIE Salazar   Bowel Function no issues    Indwelling Catheter Necessity [REMOVED]      Urethral Catheter 05/15/24  1600-Reason for Continuing Urinary Catheterization: Critically ill in ICU and requiring hourly monitoring of intake/output          De-escalation Antibiotics No        VS and assessment per flow sheet, patient progressing towards goals as tolerated, plan of care reviewed with  pt Jessica Floyd and family , all concerns addressed, will continue to monitor.

## 2024-05-25 NOTE — PROGRESS NOTES
"[facility]  Critical Care Medicine  Progress Note    Patient Name: Jessica Floyd  MRN: [id number]  Admission Date: 5/14/2024  Hospital Length of Stay: 11 days  Code Status: Full Code  Attending Provider: Edouard Giles MD  Primary Care Provider: Effie Olmedo MD   Principal Problem: Acute lower GI bleeding    Subjective:     HPI:  Mrs. Floyd is a 73 year old female with PMH notable for COPD on home 2L NC, pulm HTN, R carotid stent on ASA, HFpEF, anemia, and ERIK on infusions who presented to [facility] ED with the melena.  is at bedside and reports that the patient had an episode of diarrhea and lethargy yesterday. She reports that it felt as if "she was going to pass out", so she was using her walker to get around. Patient's  reports that this has never happened before. Patient denies excessive OTC NSAID use. She reports that she drinks 2 wine glasses a day. This morning when the patient woke up she felt very lethargic. Patient went to use the bathroom, and  noted bright red stool in the bowl. Patient reported a pre syncopal episode and felt lightheaded. She admits to weakness, SOB, light-headedness, hematochezia, and pre syncope. Patient denies nausea, vomiting, hematemesis, falls, confusion, chest pain, urinary changes, and fevers. Patient reports taking aspirin daily but no DOAC.     Hemoglobin 5.9 at presentation to ED (baseline ~10). GI and IR consulted given concern for active bleed seen on CTA. In the emergency department she was given 1L IVF and a 80 mg IV protonix bolus. Critical care medicine. consulted for GIB. CTA in the Ed revealed "Acute gastrointestinal bleed at the level of the cecum". IR planning for embolization today. Patient is also receiving 2 pRBC due to acute anemia.     Hospital/ICU Course:  Admitted to ICU for lower GIB s/p embolization with IR 5/14. Colonoscopy performed 5/16 without evidence of active bleed, old blood/clots evacuated. Patient is s/p " 8U pRBCs, 1 FFP, 1 platelet. Course complicated by worsening abdominal pain, general surgery consulted for further evaluation. Patient underwent ex lap 5/17 and found to have necrotic bowel at site of incarcerated hernia. On zosyn for intraabdominal coverage, BC NGTD. Patient oversedated on 5/18 and required BIPAP briefly for CO2 retention. Subsequently placed on precedex for agitation and IV tylenol for pain control. Patient now off precedex and tolerating QHS seroquel. Anemia stable, advanced to regular diet, on 1L NC. Patient reporting left lower extremity pain and weakness during this admission. Lower extremity xrays and ultrasounds negative for acute pathology. Most likely related to PAD however, will need outpatient neuro consultation for EMG to rule out alternative etiology.     Patient was stepped down to medicine 5/22 after reporting 2 normal non-bloody bowel movements with negative abdominal exam. On 5/23 the patient reported worsening abdominal pain and distension. She experienced a dark tarry stool over night, as well has worsening reflux. Daughter notes that patient started spitting up small volume green emesis, which progressed over several hours to large volume dark emesis. KUB was obtained converning for ileus vs partial ABO, at which point surgery was notified. NG tube was inserted and placed to low intermittent suction, with likely liters removed since insertion. She also had episode of dark stool. On examination patient was on 5 L NC and asymptomatic. She stepped back up to MICU. Hgb stable, no further episodes of dark stools. Abdominal exam with significant improvement after NG tube drainage. NG tube removed today.    Interval History/Significant Events: NG tube removed today. Advance to ice chips. Patient reporting gout flare.     Review of Systems  Objective:     Vital Signs (Most Recent):  Temp: 97.5 °F (36.4 °C) (05/25/24 0700)  Pulse: 91 (05/25/24 0800)  Resp: (!) 28 (05/25/24 0800)  BP: (!)  159/74 (05/25/24 0800)  SpO2: 98 % (05/25/24 0800) Vital Signs (24h Range):  Temp:  [97.5 °F (36.4 °C)-97.8 °F (36.6 °C)] 97.5 °F (36.4 °C)  Pulse:  [75-99] 91  Resp:  [12-54] 28  SpO2:  [83 %-100 %] 98 %  BP: (110-159)/(59-87) 159/74   Weight: 52.6 kg (115 lb 15.4 oz)  Body mass index is 20.54 kg/m².      Intake/Output Summary (Last 24 hours) at 5/25/2024 1102  Last data filed at 5/25/2024 0800  Gross per 24 hour   Intake 2564.67 ml   Output 600 ml   Net 1964.67 ml          Physical Exam  Vitals and nursing note reviewed.   Constitutional:       General: She is not in acute distress.  HENT:      Nose: No congestion or rhinorrhea.      Comments: NG tube removed     Mouth/Throat:      Mouth: Mucous membranes are dry.   Eyes:      General:         Right eye: No discharge.         Left eye: No discharge.   Cardiovascular:      Rate and Rhythm: Regular rhythm. Tachycardia present.      Pulses: Normal pulses.      Heart sounds: No murmur heard.  Pulmonary:      Effort: Pulmonary effort is normal. No respiratory distress.      Breath sounds: Normal breath sounds. No wheezing.   Abdominal:      General: Abdomen is flat. There is no distension.      Palpations: Abdomen is soft.      Tenderness: There is no abdominal tenderness.      Comments: Abdominal incision healing well. Clean/dry/no drainage.   Musculoskeletal:      Cervical back: No rigidity.      Right lower leg: No edema.      Left lower leg: No edema.   Neurological:      General: No focal deficit present.      Mental Status: She is alert and oriented to person, place, and time.   Psychiatric:         Mood and Affect: Mood normal.         Behavior: Behavior normal.            Vents:  Oxygen Concentration (%): 0.5 (05/22/24 1315)  Lines/Drains/Airways       Drain  Duration                  NG/OG Tube 05/23/24 0930 16 Fr. Right nostril 2 days    Female External Urinary Catheter w/ Suction 05/24/24 0900 1 day              Peripheral Intravenous Line  Duration                   Peripheral IV - Single Lumen 05/24/24 1750 20 G;1 3/4 in Anterior;Right Forearm <1 day         Peripheral IV - Single Lumen 05/24/24 1805 20 G;1 3/4 in Anterior;Right Upper Arm <1 day                  Significant Labs:    CBC/Anemia Profile:  Recent Labs   Lab 05/24/24  0823 05/24/24 2025 05/25/24  0732   WBC 18.82* 16.54* 13.14*   HGB 8.2* 8.1* 8.4*   HCT 26.7* 26.8* 27.3*    330 360   MCV 98 99* 98   RDW 18.1* 18.0* 18.0*        Chemistries:  Recent Labs   Lab 05/24/24  0250 05/25/24  0302    139   K 3.2* 3.4*    107   CO2 25 24   BUN 10 7*   CREATININE 0.7 0.7   CALCIUM 7.3* 7.7*   ALBUMIN 1.5* 1.5*   PROT 4.5* 4.8*   BILITOT 0.4 0.4   ALKPHOS 63 65   ALT 35 36   AST 46* 52*   MG 1.8 2.0   PHOS 3.0 2.5*         Significant Imaging:  I have reviewed all pertinent imaging results/findings within the past 24 hours.    ABG  Recent Labs   Lab 05/20/24  1056   PH 7.490*   PO2 69*   PCO2 26.5*   HCO3 20.2*   BE -3*     Assessment/Plan:     Pulmonary  Pulmonary emphysema  Not on oxygen at home. Restart home inhalers.    Cardiac/Vascular  (HFpEF) heart failure with preserved ejection fraction  Noted in history but last echo reveals normal function. Holding home coreg.    Transthoracic echo (TTE) complete 10/29/2023    Interpretation Summary    Left Ventricle: The left ventricle is normal in size. Normal wall thickness. Normal wall motion. There is normal systolic function with a visually estimated ejection fraction of 60 - 65%. There is normal diastolic function.    Right Ventricle: Mild right ventricular enlargement. Wall thickness is normal. Right ventricle wall motion  is normal. Systolic function is normal.    Aortic Valve: The aortic valve is a unicuspid valve. There is mild aortic valve sclerosis. There is trace aortic regurgitation.    Mitral Valve: There is mild regurgitation.    Pulmonary Artery: There is mild pulmonary hypertension. The estimated pulmonary artery systolic pressure is  40 mmHg.    IVC/SVC: Intermediate venous pressure at 8 mmHg.        Hypertension  Hypertension Medications                    amLODIPine (NORVASC) 5 MG tablet TAKE 1 TABLET BY MOUTH EVERY DAY     carvediloL (COREG) 6.25 MG tablet Take 1 tablet (6.25 mg total) by mouth 2 (two) times daily with meals.     hydroCHLOROthiazide (HYDRODIURIL) 25 MG tablet Take 1 tablet (25 mg total) by mouth once daily.       -Will restart home meds as tolerated    Bilateral carotid artery stenosis  April 2022     No evidence of hemodynamically significant stenosis is demonstrated in the extracranial carotid arteries.  Patent right carotid stent    Renal/  Hypernatremia  In the setting of decreased PO intake for multiple days, 1.5L deficit. Received gentle IVF, now advanced to CLD. Will prioritize PO intake. Currently improving.    Oncology  Acute blood loss anemia  See acute lower GIB    Leukocytosis  Leukocytosis on admission, initially thought to be acute phase reactant to GIB as patient afebrile. S/p 5 days IV Zosyn to cover for intraabdominal infection. Leukocytosis jumped from 13 to 25 after new ileus noted on abdominal imaging.     - Restart Zosyn for empiric intraabdominal coverage - day 2  - Leukocytosis down trending    GI  * Acute lower GI bleeding  73 year old female with multiple medical problems presenting with melena and some bright red blood with associated pre-syncope found to have hgb of 5.9 (from baseline ~10). CTA with acute gastrointestinal bleed at the level of the cecum. Patient had embolization by IR (5/14) but continued to to have larger volume dark red blood in stool with falling hgb, repeat CTA without active bleed. S/p 9U pRBCs, 1 FFP, 1 platelet. Colonoscopy performed 5/16 with no evidence of active bleed, old blood and clots evacuated. Tolerated advanced diet and stepped down on 5/22. Patient stepped back up to MICU 5/23 after ileus noted on KUB, NG tube placed, concern for GIB with dark stools however Hgb  remained stable. Dark NG tube output, likely bilious as opposed to blood.    -- diet NPO except ice chips  -- NG tube removed  -- Hgb stable, if down-trends will reach out to GI  -- Protonix 40 mg IV BID  -- Hold all A/C and A/P agents, on only DVT prophylaxis  -- Correct any coagulopathy with platelets and FFP for goal of platelets > 50K and INR <2.0   -- Maintain large bore IV access   -- MAP > 65 goal   -- Maintain type and screen   -- Trend CBCs    Incarcerated inguinal hernia  See bowel ischemia    Ischemia, bowel  S/p ex lap 5/17 with incarcerated hernia repair with bowel resection per general surgery    Ileus  Noted on CT A/P. Previously thought to have resolved. KUB concerning for continued ileus vs pSBO. S/p NGT with significant output.    -NGT removed  -IVF while NPO    Orthopedic  Left leg pain  Patient reporting left left pain and weakness associated with pain. Patient has known severe atherosclerosis which may be associated with pain/PAD. Patient has remote history of left hip fracture. Lower extremity ultrasounds and L spine, pelvis, left hip, left femur, left knee, left tib/fib xrays unrevealing.    - PT/OT  - PT recommending rehab, PM&R consulted  - Lidocaine lotion PRN  - Neurology consultation at discharge for EMG to rule out nerve damage    Gout  Patient unable to take gout prophylactic home meds while NPO. Experiencing gout flare 5/25, left foot. NSAIDs contraindicated with GIB. Avoiding opioids in setting of ileus. No IV uric acid reducing medications on formulary.     - Voltaren gel   - Ice   - IV steroids if absolutely necessary    Other  History Situational (ie Stress Induced) syncope ([facility] admission 12-25-23  History of syncope. Thought to be reflex mediated. Had pre-syncopal episode at home after having diarrhea but before she noticed active bleeding.     Acute hypoxic on chronic hypercapnic respiratory failure  Patient with Hypercapnic and Hypoxic Respiratory failure which is Acute.   she is on home oxygen at 2 LPM. Signs/symptoms of respiratory failure include- respiratory distress. Contributing diagnoses includes - COPD and oversedation.  Labs and images were reviewed. Patient Has recent ABG, which has been reviewed. Will treat underlying causes and adjust management of respiratory failure as follows: Patient required intermittent short term BIPAP, now satting well on low flow O2 NC.    - Home inhalers  - IS    Impaired mobility  See left leg pain       Critical Care Daily Checklist:    A: Awake: RASS Goal/Actual Goal: RASS Goal: 0-->alert and calm  Actual:     B: Spontaneous Breathing Trial Performed?     C: SAT & SBT Coordinated?  na                      D: Delirium: CAM-ICU Overall CAM-ICU: Negative   E: Early Mobility Performed? Yes   F: Feeding Goal:    Status:     Current Diet Order   Procedures    Diet NPO Except for: Ice Chips, Sips with Medication, Medication     Order Specific Question:   Except for     Answer:   Ice Chips     Order Specific Question:   Except for     Answer:   Sips with Medication     Order Specific Question:   Except for     Answer:   Medication      AS: Analgesia/Sedation na   T: Thromboembolic Prophylaxis GIB   H: HOB > 300 Yes   U: Stress Ulcer Prophylaxis (if needed) PPI   G: Glucose Control na   B: Bowel Function Stool Occurrence: 1   I: Indwelling Catheter (Lines & Maldonado) Necessity PIV   D: De-escalation of Antimicrobials/Pharmacotherapies Zosyn    Plan for the day/ETD PT/OT, remove NG    Code Status:  Family/Goals of Care: Full Code        Critical care was time spent personally by me on the following activities: development of treatment plan with patient or surrogate and bedside caregivers, discussions with consultants, evaluation of patient's response to treatment, examination of patient, ordering and performing treatments and interventions, ordering and review of laboratory studies, ordering and review of radiographic studies, pulse oximetry, re-evaluation  of patient's condition. This critical care time did not overlap with that of any other provider or involve time for any procedures.     Alesia Archer MD  Critical Care Medicine  [facility]

## 2024-05-25 NOTE — SUBJECTIVE & OBJECTIVE
Interval History: NAEON. HDS. 1 BM recorded yesterday. NGT with bilious output ~ 500cc. Abdomen non-distended. Hgb stable.     Medications:  Continuous Infusions:  Scheduled Meds:   acetaminophen  15 mg/kg Intravenous Q8H    cyanocobalamin  1,000 mcg Intramuscular Weekly    fluticasone furoate-vilanteroL  1 puff Inhalation Daily    pantoprazole  40 mg Intravenous BID    piperacillin-tazobactam (Zosyn) IV (PEDS and ADULTS) (extended infusion is not appropriate)  4.5 g Intravenous Q8H    potassium chloride  10 mEq Intravenous Q1H    tiotropium bromide  2 puff Inhalation Daily     PRN Meds:  Current Facility-Administered Medications:     sodium chloride 0.9%, , Intravenous, PRN    acetaminophen, 650 mg, Oral, Q6H PRN    albuterol-ipratropium, 3 mL, Nebulization, Q4H PRN    aluminum & magnesium hydroxide-simethicone, 30 mL, Oral, Q6H PRN    LIDOcaine, , Topical (Top), BID PRN    ondansetron, 4 mg, Intravenous, Q4H PRN    sodium chloride, 1 spray, Each Nostril, PRN    sodium chloride 0.9%, 10 mL, Intravenous, PRN     Review of patient's allergies indicates:  No Known Allergies  Objective:     Vital Signs (Most Recent):  Temp: 97.5 °F (36.4 °C) (05/25/24 0700)  Pulse: 91 (05/25/24 0800)  Resp: (!) 28 (05/25/24 0800)  BP: (!) 159/74 (05/25/24 0800)  SpO2: 98 % (05/25/24 0800) Vital Signs (24h Range):  Temp:  [97.5 °F (36.4 °C)-98.8 °F (37.1 °C)] 97.5 °F (36.4 °C)  Pulse:  [75-99] 91  Resp:  [12-54] 28  SpO2:  [83 %-100 %] 98 %  BP: (110-159)/(59-87) 159/74     Weight: 52.6 kg (115 lb 15.4 oz)  Body mass index is 20.54 kg/m².    Intake/Output - Last 3 Shifts         05/23 0700 05/24 0659 05/24 0700 05/25 0659 05/25 0700 05/26 0659    P.O. 0      I.V. (mL/kg) 1770.1 (33.7) 1928.4 (36.7)     NG/GT 0      IV Piggyback 210.4 636.2     Total Intake(mL/kg) 1980.4 (37.7) 2564.7 (48.8)     Urine (mL/kg/hr) 0 (0)  100 (1.1)    Drains 810 500     Other 0      Total Output 810 500 100    Net +1170.4 +2064.7 -100           Urine  Occurrence 2 x 1 x     Stool Occurrence  1 x              Physical Exam  Vitals and nursing note reviewed.   Constitutional:       General: She is not in acute distress.     Appearance: She is well-developed. She is not diaphoretic.      Interventions: Nasal cannula in place.      Comments: O2 via NC   HENT:      Head: Normocephalic and atraumatic.      Mouth/Throat:      Mouth: Mucous membranes are moist.      Pharynx: Oropharynx is clear. No oropharyngeal exudate.   Eyes:      General: No scleral icterus.     Extraocular Movements: Extraocular movements intact.   Cardiovascular:      Rate and Rhythm: Normal rate.   Pulmonary:      Effort: Pulmonary effort is normal. No respiratory distress.   Abdominal:      Comments:   Improving abdominal distention that remains soft and NTD   Incision is clean, dry, and intact without drainage, erythema, or increased warmth.   Skin:     General: Skin is warm and dry.   Neurological:      General: No focal deficit present.      Mental Status: She is alert and oriented to person, place, and time.      Cranial Nerves: No cranial nerve deficit.      Significant Labs:  I have reviewed all pertinent lab results within the past 24 hours.    Significant Diagnostics:  I have reviewed all pertinent imaging results/findings within the past 24 hours.

## 2024-05-25 NOTE — PROGRESS NOTES
[facility] - Cardiac Medical ICU  General Surgery  Progress Note    Subjective:     History of Present Illness:  Jessica Floyd is a 73 y.o. female with a history of HTN, HFpEF, CKD3, GERD, and CAD who presented to the ED on 5/14/24 with melena. She is altered on my interview and so the history was collected from chart review and the family. Appears she was feeling light headed and as though she was going to pass out. She also noted hematochezia. Upon arrival she was AF, tachycardic, and hypotensive. CTA was concerning for GIB and so she was taken to IR where she was found to have extravasation from the ileal branch of the SMA. They performed a coil embolization of a tertiary branch. Following this, she was reportedly doing well from a mentation standpoint, but has continued to require blood transfusion. She had an EGD on 5/15 and C-scope on 5/16 neither of which revealed a source for her bleed although GI was not able to reach the cecum due to tortuosity. Over the last couple of days, she has had worsening abdominal pain and she is not able to participate with my interview due to AMS. Her family feels this is pain related although she has been received narcotics and ativan PRN.      She had a low grade fever to 100.6 earlier today. Currently tachycardic to the 100s but HDS. Her CBC is notable for WBC 40 (stable from prior) and H/H 8.6/25.5. her CMP shows a 2 bili of 2.2 but is otherwise unremarkable. Lactic acid is normal at 1.3. She had a repeat CT A/P today which showed dilated bowel consistent with an ileus. However, per her family and the primary team, her pain seems out of proportion to the findings.     Post-Op Info:  Procedure(s) (LRB):  LAPAROSCOPY, DIAGNOSTIC (N/A)  LAPAROTOMY, EXPLORATORY (N/A)  EXCISION, SMALL INTESTINE (N/A)  REPAIR, HERNIA, INGUINAL (Bilateral)   8 Days Post-Op     Interval History: NAEON. HDS. 1 BM recorded yesterday. NGT with bilious output ~ 500cc. Abdomen non-distended. Hgb stable.      Medications:  Continuous Infusions:  Scheduled Meds:   acetaminophen  15 mg/kg Intravenous Q8H    cyanocobalamin  1,000 mcg Intramuscular Weekly    fluticasone furoate-vilanteroL  1 puff Inhalation Daily    pantoprazole  40 mg Intravenous BID    piperacillin-tazobactam (Zosyn) IV (PEDS and ADULTS) (extended infusion is not appropriate)  4.5 g Intravenous Q8H    potassium chloride  10 mEq Intravenous Q1H    tiotropium bromide  2 puff Inhalation Daily     PRN Meds:  Current Facility-Administered Medications:     sodium chloride 0.9%, , Intravenous, PRN    acetaminophen, 650 mg, Oral, Q6H PRN    albuterol-ipratropium, 3 mL, Nebulization, Q4H PRN    aluminum & magnesium hydroxide-simethicone, 30 mL, Oral, Q6H PRN    LIDOcaine, , Topical (Top), BID PRN    ondansetron, 4 mg, Intravenous, Q4H PRN    sodium chloride, 1 spray, Each Nostril, PRN    sodium chloride 0.9%, 10 mL, Intravenous, PRN     Review of patient's allergies indicates:  No Known Allergies  Objective:     Vital Signs (Most Recent):  Temp: 97.5 °F (36.4 °C) (05/25/24 0700)  Pulse: 91 (05/25/24 0800)  Resp: (!) 28 (05/25/24 0800)  BP: (!) 159/74 (05/25/24 0800)  SpO2: 98 % (05/25/24 0800) Vital Signs (24h Range):  Temp:  [97.5 °F (36.4 °C)-98.8 °F (37.1 °C)] 97.5 °F (36.4 °C)  Pulse:  [75-99] 91  Resp:  [12-54] 28  SpO2:  [83 %-100 %] 98 %  BP: (110-159)/(59-87) 159/74     Weight: 52.6 kg (115 lb 15.4 oz)  Body mass index is 20.54 kg/m².    Intake/Output - Last 3 Shifts         05/23 0700 05/24 0659 05/24 0700 05/25 0659 05/25 0700 05/26 0659    P.O. 0      I.V. (mL/kg) 1770.1 (33.7) 1928.4 (36.7)     NG/GT 0      IV Piggyback 210.4 636.2     Total Intake(mL/kg) 1980.4 (37.7) 2564.7 (48.8)     Urine (mL/kg/hr) 0 (0)  100 (1.1)    Drains 810 500     Other 0      Total Output 810 500 100    Net +1170.4 +2064.7 -100           Urine Occurrence 2 x 1 x     Stool Occurrence  1 x              Physical Exam  Vitals and nursing note reviewed.   Constitutional:        General: She is not in acute distress.     Appearance: She is well-developed. She is not diaphoretic.      Interventions: Nasal cannula in place.      Comments: O2 via NC   HENT:      Head: Normocephalic and atraumatic.      Mouth/Throat:      Mouth: Mucous membranes are moist.      Pharynx: Oropharynx is clear. No oropharyngeal exudate.   Eyes:      General: No scleral icterus.     Extraocular Movements: Extraocular movements intact.   Cardiovascular:      Rate and Rhythm: Normal rate.   Pulmonary:      Effort: Pulmonary effort is normal. No respiratory distress.   Abdominal:      Comments:   Improving abdominal distention that remains soft and NTD   Incision is clean, dry, and intact without drainage, erythema, or increased warmth.   Skin:     General: Skin is warm and dry.   Neurological:      General: No focal deficit present.      Mental Status: She is alert and oriented to person, place, and time.      Cranial Nerves: No cranial nerve deficit.      Significant Labs:  I have reviewed all pertinent lab results within the past 24 hours.    Significant Diagnostics:  I have reviewed all pertinent imaging results/findings within the past 24 hours.  Assessment/Plan:     * Acute lower GI bleeding  Jessica Floyd is a 73 y.o. female who presented with a GIB s/p IR embolization of a tertiary branch of the ileal artery now with severe LLQ pain. She is now s/p diagnostic laparotomy converted to ex lap with small bowel resection and primary tissue repair of bilateral inguinal hernias 5/17. Previously with ROBF and tolerating diet, now with emesis and dilated bowel concerning for ileus s/p NGT decompression.     - Ok to remove NGT.  - Ok for meds with sips and ice chips only.   - Hgb stable  - Aspiration precautions  - Replete lytes PRN   - K>4, Mag>2, Phos>3  - Abx per primary  - Okay for PT/OT. Would recommend early mobilization as tolerated  - Remainder of care per primary team  - Please contact general surgery with  any questions, concerns, or clinical status changes    Post Op Instructions:  Follow up in general surgery clinic in 2 weeks. Message sent to clinic for scheduling. Clinic # is [phone]  No heavy lifting (>10lbs or a gallon of milk) for 6 weeks from day of surgery.   No pushing or pulling activities such as vacuuming or raking.   Activity as tolerated.   Patient can shower, allow water to run over incisions. No soaking in bath or pools for 2 weeks. Do not pick at surgical glue, it will come off on its own.   You have been prescribed a narcotic pain medication. No driving while taking narcotics and until you can react quickly without pain. Please wean narcotic over the next few days. You may alternate tylenol and ibuprofen for additional pain control.  No straining, avoid constipation. May take OTC stool softeners as described and laxatives as needed.              Arlene Lee MD  General Surgery  [facility] - Cardiac Medical ICU

## 2024-05-25 NOTE — PLAN OF CARE
MICU DAILY GOALS     Family/Goals of care/Code Status   Code Status: Full Code    24H Vital Sign Range  Temp:  [97.8 °F (36.6 °C)-99.3 °F (37.4 °C)]   Pulse:  []   Resp:  [14-54]   BP: (103-155)/(54-87)   SpO2:  [83 %-100 %]      Shift Events (include procedures and significant events)   No acute events throughout shift    AWAKE RASS: Goal - RASS Goal: 0-->alert and calm  Actual - RASS ([place] Agitation-Sedation Scale): alert and calm    Restraint necessity: Not necessary   BREATHE SBT: Not intubated    Coordinate A & B, analgesics/sedatives Pain: managed   SAT: Not intubated   Delirium CAM-ICU: Overall CAM-ICU: Negative   Early(intubated/ Progressive (non-intubated) Mobility MOVE Screen (INTUBATED ONLY): Not intubated    Activity: Activity Management: Up in chair - L3   Feeding/Nutrition Diet order: Diet/Nutrition Received: NPO, ice chips,     Thrombus DVT prophylaxis: VTE Required Core Measure: Pharmacological prophylaxis initiated/maintained   HOB Elevation Head of Bed (HOB) Positioning: HOB elevated   Ulcer Prophylaxis GI: yes   Glucose control managed     Skin Skin assessed during: Daily Assessment    Sacrum intact/not altered? Yes  Heels intact/not altered? Yes  Surgical wound? Yes    CHECK ONE!   (no altered skin or altered skin) and sub boxes:  [] No Altered Skin Integrity Present    []Prevention Measures Documented    [x] Altered Skin Integrity Present or Discovered   [x] LDA present in EPIC, daily doc completed              [] LDA added if not in EPIC (describe wound).                    When describing wound, do not stage, use descriptive words only.    [] Wound Image Taken (required on admit,                   transfer/discharge and every Tuesday)    Wound Care Consulted? Yes    4 EYES:  Attending Nurse (1st set of eyes): BARRY Fox    Second RN/Staff Member (2nd set of eyes): BARRY Mcgowan   Bowel Function no issues    Indwelling Catheter Necessity [REMOVED]      Urethral Catheter 05/15/24  1600-Reason for Continuing Urinary Catheterization: Critically ill in ICU and requiring hourly monitoring of intake/output          De-escalation Antibiotics Yes        VS and assessment per flow sheet, patient progressing towards goals as tolerated, plan of care reviewed with  pt Jessica Floyd and family , all concerns addressed, will continue to monitor.

## 2024-05-25 NOTE — ASSESSMENT & PLAN NOTE
Noted on CT A/P. Previously thought to have resolved. KUB concerning for continued ileus vs pSBO. S/p NGT with significant output.    -NGT removed  -IVF while NPO

## 2024-05-25 NOTE — SUBJECTIVE & OBJECTIVE
Interval History/Significant Events: NG tube removed today. Advance to ice chips. Patient reporting gout flare.     Review of Systems  Objective:     Vital Signs (Most Recent):  Temp: 97.5 °F (36.4 °C) (05/25/24 0700)  Pulse: 91 (05/25/24 0800)  Resp: (!) 28 (05/25/24 0800)  BP: (!) 159/74 (05/25/24 0800)  SpO2: 98 % (05/25/24 0800) Vital Signs (24h Range):  Temp:  [97.5 °F (36.4 °C)-97.8 °F (36.6 °C)] 97.5 °F (36.4 °C)  Pulse:  [75-99] 91  Resp:  [12-54] 28  SpO2:  [83 %-100 %] 98 %  BP: (110-159)/(59-87) 159/74   Weight: 52.6 kg (115 lb 15.4 oz)  Body mass index is 20.54 kg/m².      Intake/Output Summary (Last 24 hours) at 5/25/2024 1102  Last data filed at 5/25/2024 0800  Gross per 24 hour   Intake 2564.67 ml   Output 600 ml   Net 1964.67 ml          Physical Exam  Vitals and nursing note reviewed.   Constitutional:       General: She is not in acute distress.  HENT:      Nose: No congestion or rhinorrhea.      Comments: NG tube removed     Mouth/Throat:      Mouth: Mucous membranes are dry.   Eyes:      General:         Right eye: No discharge.         Left eye: No discharge.   Cardiovascular:      Rate and Rhythm: Regular rhythm. Tachycardia present.      Pulses: Normal pulses.      Heart sounds: No murmur heard.  Pulmonary:      Effort: Pulmonary effort is normal. No respiratory distress.      Breath sounds: Normal breath sounds. No wheezing.   Abdominal:      General: Abdomen is flat. There is no distension.      Palpations: Abdomen is soft.      Tenderness: There is no abdominal tenderness.      Comments: Abdominal incision healing well. Clean/dry/no drainage.   Musculoskeletal:      Cervical back: No rigidity.      Right lower leg: No edema.      Left lower leg: No edema.   Neurological:      General: No focal deficit present.      Mental Status: She is alert and oriented to person, place, and time.   Psychiatric:         Mood and Affect: Mood normal.         Behavior: Behavior normal.             Vents:  Oxygen Concentration (%): 0.5 (05/22/24 1315)  Lines/Drains/Airways       Drain  Duration                  NG/OG Tube 05/23/24 0930 16 Fr. Right nostril 2 days    Female External Urinary Catheter w/ Suction 05/24/24 0900 1 day              Peripheral Intravenous Line  Duration                  Peripheral IV - Single Lumen 05/24/24 1750 20 G;1 3/4 in Anterior;Right Forearm <1 day         Peripheral IV - Single Lumen 05/24/24 1805 20 G;1 3/4 in Anterior;Right Upper Arm <1 day                  Significant Labs:    CBC/Anemia Profile:  Recent Labs   Lab 05/24/24  0823 05/24/24 2025 05/25/24  0732   WBC 18.82* 16.54* 13.14*   HGB 8.2* 8.1* 8.4*   HCT 26.7* 26.8* 27.3*    330 360   MCV 98 99* 98   RDW 18.1* 18.0* 18.0*        Chemistries:  Recent Labs   Lab 05/24/24  0250 05/25/24  0302    139   K 3.2* 3.4*    107   CO2 25 24   BUN 10 7*   CREATININE 0.7 0.7   CALCIUM 7.3* 7.7*   ALBUMIN 1.5* 1.5*   PROT 4.5* 4.8*   BILITOT 0.4 0.4   ALKPHOS 63 65   ALT 35 36   AST 46* 52*   MG 1.8 2.0   PHOS 3.0 2.5*         Significant Imaging:  I have reviewed all pertinent imaging results/findings within the past 24 hours.

## 2024-05-25 NOTE — PLAN OF CARE
Problem: Occupational Therapy  Goal: Occupational Therapy Goal  Description: Re-eval completed 05-25 Goals to be met by: 6/8/24     Patient will increase functional independence with ADLs by performing:    UE Dressing with Stand-by Assistance.  LE Dressing with Minimal Assistance.  Grooming while standing at sink with stand by Assistance.  Toileting from toilet with Minimal Assistance for hygiene and clothing management.   Toilet transfer to toilet with stand by assist    Outcome: Progressing

## 2024-05-25 NOTE — ASSESSMENT & PLAN NOTE
Jessica Floyd is a 73 y.o. female who presented with a GIB s/p IR embolization of a tertiary branch of the ileal artery now with severe LLQ pain. She is now s/p diagnostic laparotomy converted to ex lap with small bowel resection and primary tissue repair of bilateral inguinal hernias 5/17. Previously with ROBF and tolerating diet, now with emesis and dilated bowel concerning for ileus s/p NGT decompression.     - NPO  - NGT to LIWS - Will discuss removal with staff  - Recommend replacing NGT output 1:1 with LR or other crystalloid   - Convert meds to IV as able. Likely will not absorb PO in setting of ileus   - Hgb stable  - Aspiration precautions  - Replete lytes PRN   - K>4, Mag>2, Phos>3  - Abx per primary  - Okay for PT/OT. Would recommend early mobilization as tolerated  - Remainder of care per primary team  - Please contact general surgery with any questions, concerns, or clinical status changes    Post Op Instructions:  Follow up in general surgery clinic in 2 weeks. Message sent to clinic for scheduling. Clinic # is [phone]  No heavy lifting (>10lbs or a gallon of milk) for 6 weeks from day of surgery.   No pushing or pulling activities such as vacuuming or raking.   Activity as tolerated.   Patient can shower, allow water to run over incisions. No soaking in bath or pools for 2 weeks. Do not pick at surgical glue, it will come off on its own.   You have been prescribed a narcotic pain medication. No driving while taking narcotics and until you can react quickly without pain. Please wean narcotic over the next few days. You may alternate tylenol and ibuprofen for additional pain control.  No straining, avoid constipation. May take OTC stool softeners as described and laxatives as needed.

## 2024-05-26 LAB
ALBUMIN SERPL BCP-MCNC: 1.6 G/DL (ref 3.5–5.2)
ALP SERPL-CCNC: 65 U/L (ref 55–135)
ALT SERPL W/O P-5'-P-CCNC: 35 U/L (ref 10–44)
ANION GAP SERPL CALC-SCNC: 10 MMOL/L (ref 8–16)
ANISOCYTOSIS BLD QL SMEAR: SLIGHT
AST SERPL-CCNC: 46 U/L (ref 10–40)
BASO STIPL BLD QL SMEAR: ABNORMAL
BASOPHILS # BLD AUTO: 0.18 K/UL (ref 0–0.2)
BASOPHILS # BLD AUTO: 0.23 K/UL (ref 0–0.2)
BASOPHILS NFR BLD: 1.6 % (ref 0–1.9)
BASOPHILS NFR BLD: 1.7 % (ref 0–1.9)
BILIRUB SERPL-MCNC: 0.5 MG/DL (ref 0.1–1)
BUN SERPL-MCNC: 5 MG/DL (ref 8–23)
BURR CELLS BLD QL SMEAR: ABNORMAL
CALCIUM SERPL-MCNC: 7.5 MG/DL (ref 8.7–10.5)
CHLORIDE SERPL-SCNC: 103 MMOL/L (ref 95–110)
CO2 SERPL-SCNC: 22 MMOL/L (ref 23–29)
CREAT SERPL-MCNC: 0.7 MG/DL (ref 0.5–1.4)
DIFFERENTIAL METHOD BLD: ABNORMAL
DIFFERENTIAL METHOD BLD: ABNORMAL
EOSINOPHIL # BLD AUTO: 0.6 K/UL (ref 0–0.5)
EOSINOPHIL # BLD AUTO: 0.7 K/UL (ref 0–0.5)
EOSINOPHIL NFR BLD: 4.2 % (ref 0–8)
EOSINOPHIL NFR BLD: 6.3 % (ref 0–8)
ERYTHROCYTE [DISTWIDTH] IN BLOOD BY AUTOMATED COUNT: 17.3 % (ref 11.5–14.5)
ERYTHROCYTE [DISTWIDTH] IN BLOOD BY AUTOMATED COUNT: 17.4 % (ref 11.5–14.5)
EST. GFR  (NO RACE VARIABLE): >60 ML/MIN/1.73 M^2
GLUCOSE SERPL-MCNC: 78 MG/DL (ref 70–110)
HCT VFR BLD AUTO: 26.7 % (ref 37–48.5)
HCT VFR BLD AUTO: 28.3 % (ref 37–48.5)
HGB BLD-MCNC: 8.4 G/DL (ref 12–16)
HGB BLD-MCNC: 8.6 G/DL (ref 12–16)
HYPOCHROMIA BLD QL SMEAR: ABNORMAL
IMM GRANULOCYTES # BLD AUTO: 0.15 K/UL (ref 0–0.04)
IMM GRANULOCYTES # BLD AUTO: 0.28 K/UL (ref 0–0.04)
IMM GRANULOCYTES NFR BLD AUTO: 1.3 % (ref 0–0.5)
IMM GRANULOCYTES NFR BLD AUTO: 2.1 % (ref 0–0.5)
INR PPP: 1 (ref 0.8–1.2)
LYMPHOCYTES # BLD AUTO: 1.2 K/UL (ref 1–4.8)
LYMPHOCYTES # BLD AUTO: 1.3 K/UL (ref 1–4.8)
LYMPHOCYTES NFR BLD: 11.6 % (ref 18–48)
LYMPHOCYTES NFR BLD: 9.2 % (ref 18–48)
MAGNESIUM SERPL-MCNC: 1.5 MG/DL (ref 1.6–2.6)
MCH RBC QN AUTO: 29.7 PG (ref 27–31)
MCH RBC QN AUTO: 30.3 PG (ref 27–31)
MCHC RBC AUTO-ENTMCNC: 30.4 G/DL (ref 32–36)
MCHC RBC AUTO-ENTMCNC: 31.5 G/DL (ref 32–36)
MCV RBC AUTO: 96 FL (ref 82–98)
MCV RBC AUTO: 98 FL (ref 82–98)
MONOCYTES # BLD AUTO: 1.7 K/UL (ref 0.3–1)
MONOCYTES # BLD AUTO: 2.1 K/UL (ref 0.3–1)
MONOCYTES NFR BLD: 14.9 % (ref 4–15)
MONOCYTES NFR BLD: 15.5 % (ref 4–15)
NEUTROPHILS # BLD AUTO: 7.4 K/UL (ref 1.8–7.7)
NEUTROPHILS # BLD AUTO: 9.1 K/UL (ref 1.8–7.7)
NEUTROPHILS NFR BLD: 64.3 % (ref 38–73)
NEUTROPHILS NFR BLD: 67.3 % (ref 38–73)
NRBC BLD-RTO: 0 /100 WBC
NRBC BLD-RTO: 0 /100 WBC
PHOSPHATE SERPL-MCNC: 3 MG/DL (ref 2.7–4.5)
PLATELET # BLD AUTO: 446 K/UL (ref 150–450)
PLATELET # BLD AUTO: 510 K/UL (ref 150–450)
PLATELET BLD QL SMEAR: ABNORMAL
PMV BLD AUTO: 10.1 FL (ref 9.2–12.9)
PMV BLD AUTO: 10.1 FL (ref 9.2–12.9)
POIKILOCYTOSIS BLD QL SMEAR: SLIGHT
POLYCHROMASIA BLD QL SMEAR: ABNORMAL
POTASSIUM SERPL-SCNC: 3.5 MMOL/L (ref 3.5–5.1)
PROT SERPL-MCNC: 4.9 G/DL (ref 6–8.4)
PROTHROMBIN TIME: 10.5 SEC (ref 9–12.5)
RBC # BLD AUTO: 2.77 M/UL (ref 4–5.4)
RBC # BLD AUTO: 2.9 M/UL (ref 4–5.4)
SODIUM SERPL-SCNC: 135 MMOL/L (ref 136–145)
SPHEROCYTES BLD QL SMEAR: ABNORMAL
WBC # BLD AUTO: 11.46 K/UL (ref 3.9–12.7)
WBC # BLD AUTO: 13.46 K/UL (ref 3.9–12.7)

## 2024-05-26 PROCEDURE — 25000003 PHARM REV CODE 250: Mod: NTX

## 2024-05-26 PROCEDURE — 85025 COMPLETE CBC W/AUTO DIFF WBC: CPT | Mod: 91,NTX

## 2024-05-26 PROCEDURE — 99900035 HC TECH TIME PER 15 MIN (STAT): Mod: NTX

## 2024-05-26 PROCEDURE — 94640 AIRWAY INHALATION TREATMENT: CPT | Mod: NTX

## 2024-05-26 PROCEDURE — 94761 N-INVAS EAR/PLS OXIMETRY MLT: CPT | Mod: NTX

## 2024-05-26 PROCEDURE — 94664 DEMO&/EVAL PT USE INHALER: CPT | Mod: NTX

## 2024-05-26 PROCEDURE — 85610 PROTHROMBIN TIME: CPT | Mod: NTX

## 2024-05-26 PROCEDURE — 27000221 HC OXYGEN, UP TO 24 HOURS: Mod: NTX

## 2024-05-26 PROCEDURE — 97116 GAIT TRAINING THERAPY: CPT | Mod: NTX

## 2024-05-26 PROCEDURE — 97164 PT RE-EVAL EST PLAN CARE: CPT | Mod: NTX

## 2024-05-26 PROCEDURE — 11000001 HC ACUTE MED/SURG PRIVATE ROOM: Mod: NTX

## 2024-05-26 PROCEDURE — C9113 INJ PANTOPRAZOLE SODIUM, VIA: HCPCS | Mod: NTX | Performed by: INTERNAL MEDICINE

## 2024-05-26 PROCEDURE — 63600175 PHARM REV CODE 636 W HCPCS: Mod: NTX | Performed by: INTERNAL MEDICINE

## 2024-05-26 PROCEDURE — 94660 CPAP INITIATION&MGMT: CPT | Mod: NTX

## 2024-05-26 PROCEDURE — 80053 COMPREHEN METABOLIC PANEL: CPT | Mod: NTX

## 2024-05-26 PROCEDURE — 63600175 PHARM REV CODE 636 W HCPCS: Mod: NTX

## 2024-05-26 PROCEDURE — 84100 ASSAY OF PHOSPHORUS: CPT | Mod: NTX

## 2024-05-26 PROCEDURE — 99233 SBSQ HOSP IP/OBS HIGH 50: CPT | Mod: GC,NTX,, | Performed by: INTERNAL MEDICINE

## 2024-05-26 PROCEDURE — 83735 ASSAY OF MAGNESIUM: CPT | Mod: NTX

## 2024-05-26 RX ORDER — COLCHICINE 0.6 MG/1
0.6 TABLET, FILM COATED ORAL ONCE
Status: COMPLETED | OUTPATIENT
Start: 2024-05-26 | End: 2024-05-26

## 2024-05-26 RX ORDER — POTASSIUM CHLORIDE 7.45 MG/ML
10 INJECTION INTRAVENOUS
Status: DISPENSED | OUTPATIENT
Start: 2024-05-26 | End: 2024-05-26

## 2024-05-26 RX ORDER — ALLOPURINOL 100 MG/1
200 TABLET ORAL DAILY
Status: DISCONTINUED | OUTPATIENT
Start: 2024-05-26 | End: 2024-06-03 | Stop reason: HOSPADM

## 2024-05-26 RX ORDER — MAGNESIUM SULFATE HEPTAHYDRATE 40 MG/ML
2 INJECTION, SOLUTION INTRAVENOUS ONCE
Status: COMPLETED | OUTPATIENT
Start: 2024-05-26 | End: 2024-05-26

## 2024-05-26 RX ORDER — TALC
6 POWDER (GRAM) TOPICAL NIGHTLY PRN
Status: DISCONTINUED | OUTPATIENT
Start: 2024-05-26 | End: 2024-06-03 | Stop reason: HOSPADM

## 2024-05-26 RX ORDER — COLCHICINE 0.6 MG/1
0.6 TABLET, FILM COATED ORAL ONCE
Status: DISCONTINUED | OUTPATIENT
Start: 2024-05-26 | End: 2024-05-26

## 2024-05-26 RX ORDER — COLCHICINE 0.6 MG/1
1.2 TABLET, FILM COATED ORAL ONCE
Status: DISCONTINUED | OUTPATIENT
Start: 2024-05-26 | End: 2024-05-26

## 2024-05-26 RX ORDER — COLCHICINE 0.6 MG/1
0.6 TABLET, FILM COATED ORAL DAILY
Status: DISCONTINUED | OUTPATIENT
Start: 2024-05-27 | End: 2024-06-03 | Stop reason: HOSPADM

## 2024-05-26 RX ORDER — COLCHICINE 0.6 MG/1
1.2 TABLET, FILM COATED ORAL ONCE
Status: COMPLETED | OUTPATIENT
Start: 2024-05-26 | End: 2024-05-26

## 2024-05-26 RX ADMIN — ACETAMINOPHEN 650 MG: 650 SOLUTION ORAL at 08:05

## 2024-05-26 RX ADMIN — PIPERACILLIN SODIUM AND TAZOBACTAM SODIUM 4.5 G: 4; .5 INJECTION, POWDER, FOR SOLUTION INTRAVENOUS at 06:05

## 2024-05-26 RX ADMIN — DICLOFENAC SODIUM 2 G: 10 GEL TOPICAL at 08:05

## 2024-05-26 RX ADMIN — ALLOPURINOL 200 MG: 100 TABLET ORAL at 08:05

## 2024-05-26 RX ADMIN — PANTOPRAZOLE SODIUM 40 MG: 40 INJECTION, POWDER, FOR SOLUTION INTRAVENOUS at 08:05

## 2024-05-26 RX ADMIN — POTASSIUM CHLORIDE 10 MEQ: 7.46 INJECTION, SOLUTION INTRAVENOUS at 11:05

## 2024-05-26 RX ADMIN — FLUTICASONE FUROATE AND VILANTEROL TRIFENATATE 1 PUFF: 100; 25 POWDER RESPIRATORY (INHALATION) at 07:05

## 2024-05-26 RX ADMIN — COLCHICINE 0.6 MG: 0.6 TABLET, FILM COATED ORAL at 09:05

## 2024-05-26 RX ADMIN — Medication 6 MG: at 08:05

## 2024-05-26 RX ADMIN — QUETIAPINE FUMARATE 50 MG: 25 TABLET ORAL at 08:05

## 2024-05-26 RX ADMIN — POTASSIUM CHLORIDE 10 MEQ: 7.46 INJECTION, SOLUTION INTRAVENOUS at 09:05

## 2024-05-26 RX ADMIN — ACETAMINOPHEN 650 MG: 650 SOLUTION ORAL at 03:05

## 2024-05-26 RX ADMIN — AMLODIPINE BESYLATE 5 MG: 5 TABLET ORAL at 08:05

## 2024-05-26 RX ADMIN — PIPERACILLIN SODIUM AND TAZOBACTAM SODIUM 4.5 G: 4; .5 INJECTION, POWDER, FOR SOLUTION INTRAVENOUS at 03:05

## 2024-05-26 RX ADMIN — COLCHICINE 1.2 MG: 0.6 TABLET, FILM COATED ORAL at 08:05

## 2024-05-26 RX ADMIN — TIOTROPIUM BROMIDE INHALATION SPRAY 2 PUFF: 3.12 SPRAY, METERED RESPIRATORY (INHALATION) at 07:05

## 2024-05-26 RX ADMIN — MAGNESIUM SULFATE HEPTAHYDRATE 2 G: 40 INJECTION, SOLUTION INTRAVENOUS at 05:05

## 2024-05-26 RX ADMIN — POTASSIUM CHLORIDE 10 MEQ: 7.46 INJECTION, SOLUTION INTRAVENOUS at 12:05

## 2024-05-26 RX ADMIN — PIPERACILLIN SODIUM AND TAZOBACTAM SODIUM 4.5 G: 4; .5 INJECTION, POWDER, FOR SOLUTION INTRAVENOUS at 12:05

## 2024-05-26 RX ADMIN — POTASSIUM CHLORIDE 10 MEQ: 7.46 INJECTION, SOLUTION INTRAVENOUS at 08:05

## 2024-05-26 RX ADMIN — LIDOCAINE: 40 CREAM TOPICAL at 10:05

## 2024-05-26 NOTE — PROGRESS NOTES
"[facility] - [facility]  Critical Care Medicine  Progress Note    Patient Name: Jessica Floyd  MRN: [id number]  Admission Date: 5/14/2024  Hospital Length of Stay: 12 days  Code Status: Full Code  Attending Provider: Edouard Giles MD  Primary Care Provider: Effie Olmedo MD   Principal Problem: Acute lower GI bleeding    Subjective:     HPI:  Mrs. Floyd is a 73 year old female with PMH notable for COPD home O2 PRN, pulm HTN, R carotid stent on ASA, HFpEF, anemia, and ERIK on infusions who [place] [facility] [place].  is at bedside and reports that the patient had an episode of diarrhea and lethargy yesterday. She reports that it felt as if "she was going to pass out", so she was using her walker to get around. Patient's  reports that this has never happened before. Patient denies excessive OTC NSAID use. She reports that she drinks 2 wine glasses a day. This morning when the patient woke up she felt very lethargic. Patient went to use the bathroom, and  noted bright red stool in the bowl. Patient reported a pre syncopal episode and felt lightheaded. She admits to weakness, SOB, light-headedness, hematochezia, and pre syncope. Patient denies nausea, vomiting, hematemesis, falls, confusion, chest pain, urinary changes, and fevers. Patient reports taking aspirin daily but no DOAC.     Hemoglobin 5.9 at presentation to ED (baseline ~10). GI and IR consulted given concern for active bleed seen on CTA. In the emergency department she was given 1L IVF and a 80 mg IV protonix bolus. Critical care medicine. consulted for GIB. CTA in the Ed revealed "Acute gastrointestinal bleed at the level of the cecum". IR planning for embolization today. Patient is also receiving 2 pRBC due to acute anemia.     Hospital/ICU Course:  Admitted to ICU for lower GIB s/p embolization with IR 5/14. Colonoscopy performed 5/16 without evidence of active bleed, old blood/clots evacuated. Patient is s/p " 8U pRBCs, 1 FFP, 1 platelet. Course complicated by worsening abdominal pain, general surgery consulted for further evaluation. Patient underwent ex lap 5/17 and found to have necrotic bowel at site of incarcerated inguinal hernia. On zosyn for intraabdominal coverage, BC NGTD. Patient oversedated on 5/18 and required BIPAP briefly for CO2 retention. Subsequently placed on precedex for agitation and IV tylenol for pain control. Precedex discontinued and tolerating QHS seroquel. Anemia stable, advanced to regular diet, on 1L NC. Patient reporting left lower extremity pain and weakness during this admission. Lower extremity xrays and ultrasounds negative for acute pathology. Most likely related to PAD however, outpatient neuro consultation placed for EMG to rule out alternative etiology.     Patient was stepped down to medicine 5/22 after reporting 2 normal non-bloody bowel movements with negative abdominal exam. On 5/23 the patient reported worsening abdominal pain and distension. She experienced a dark tarry stool over night, as well has worsening reflux. Daughter notes that patient spitting up large volume dark emesis. KUB was obtained concerning for ileus vs partial ABO, at which point surgery was notified. NG tube was inserted and placed to low intermittent suction, with ~3 liters removed since insertion. She also had an episode of dark stool. She stepped back up to MICU. Hgb stable, no further episodes of dark stools. Abdominal exam with significant improvement after NG tube drainage. NG tube removed 5/25, advancing diet slowly per surg. Stable for stepdown.    Interval History/Significant Events: Ms. Floyd is doing well today. Po ice chips and meds ok per surg. Will treat for gout flare. Stable for stepdown.    Review of Systems  Objective:     Vital Signs (Most Recent):  Temp: 98.2 °F (36.8 °C) (05/26/24 0705)  Pulse: 80 (05/26/24 0754)  Resp: 18 (05/26/24 0754)  BP: (!) 147/72 (05/26/24 0705)  SpO2: 99 %  (05/26/24 0754) Vital Signs (24h Range):  Temp:  [97.3 °F (36.3 °C)-98.2 °F (36.8 °C)] 98.2 °F (36.8 °C)  Pulse:  [] 80  Resp:  [14-44] 18  SpO2:  [95 %-100 %] 99 %  BP: (114-185)/() 147/72   Weight: 52.6 kg (115 lb 15.4 oz)  Body mass index is 20.54 kg/m².      Intake/Output Summary (Last 24 hours) at 5/26/2024 0818  Last data filed at 5/26/2024 0615  Gross per 24 hour   Intake 2892.24 ml   Output 500 ml   Net 2392.24 ml          Physical Exam  Vitals and nursing note reviewed.   Constitutional:       General: She is not in acute distress.  HENT:      Nose: No congestion or rhinorrhea.      Mouth/Throat:      Mouth: Mucous membranes are moist.   Eyes:      General:         Right eye: No discharge.         Left eye: No discharge.   Cardiovascular:      Rate and Rhythm: Regular rhythm. Tachycardia present.      Pulses: Normal pulses.      Heart sounds: No murmur heard.  Pulmonary:      Effort: Pulmonary effort is normal. No respiratory distress.      Breath sounds: Normal breath sounds. No wheezing.   Abdominal:      General: Abdomen is flat. There is no distension.      Palpations: Abdomen is soft.      Tenderness: There is no abdominal tenderness.      Comments: Abdominal incision healing well. Clean/dry/no drainage.   Musculoskeletal:      Cervical back: No rigidity.      Right lower leg: No edema.      Left lower leg: No edema.   Neurological:      General: No focal deficit present.      Mental Status: She is alert and oriented to person, place, and time.   Psychiatric:         Mood and Affect: Mood normal.         Behavior: Behavior normal.            Vents:  Oxygen Concentration (%): 0.5 (05/22/24 1315)  Lines/Drains/Airways       Drain  Duration             Female External Urinary Catheter w/ Suction 05/24/24 0900 1 day              Peripheral Intravenous Line  Duration                  Peripheral IV - Single Lumen 05/24/24 1750 20 G;1 3/4 in Anterior;Right Forearm 1 day         Peripheral IV -  Single Lumen 05/24/24 1805 20 G;1 3/4 in Anterior;Right Upper Arm 1 day                  Significant Labs:    CBC/Anemia Profile:  Recent Labs   Lab 05/24/24 2025 05/25/24  0732 05/25/24  2048   WBC 16.54* 13.14* 13.03*   HGB 8.1* 8.4* 8.1*   HCT 26.8* 27.3* 26.1*    360 395   MCV 99* 98 97   RDW 18.0* 18.0* 17.6*        Chemistries:  Recent Labs   Lab 05/25/24  0302 05/26/24  0323    135*   K 3.4* 3.5    103   CO2 24 22*   BUN 7* 5*   CREATININE 0.7 0.7   CALCIUM 7.7* 7.5*   ALBUMIN 1.5* 1.6*   PROT 4.8* 4.9*   BILITOT 0.4 0.5   ALKPHOS 65 65   ALT 36 35   AST 52* 46*   MG 2.0 1.5*   PHOS 2.5* 3.0         Significant Imaging:  I have reviewed all pertinent imaging results/findings within the past 24 hours.    ABG  Recent Labs   Lab 05/20/24  1056   PH 7.490*   PO2 69*   PCO2 26.5*   HCO3 20.2*   BE -3*     Assessment/Plan:     Pulmonary  Pulmonary emphysema  Not on oxygen at home. Restart home inhalers.    Cardiac/Vascular  (HFpEF) heart failure with preserved ejection fraction  Noted in history but last echo reveals normal function. Holding home coreg.    Transthoracic echo (TTE) complete 10/29/2023    Interpretation Summary    Left Ventricle: The left ventricle is normal in size. Normal wall thickness. Normal wall motion. There is normal systolic function with a visually estimated ejection fraction of 60 - 65%. There is normal diastolic function.    Right Ventricle: Mild right ventricular enlargement. Wall thickness is normal. Right ventricle wall motion  is normal. Systolic function is normal.    Aortic Valve: The aortic valve is a unicuspid valve. There is mild aortic valve sclerosis. There is trace aortic regurgitation.    Mitral Valve: There is mild regurgitation.    Pulmonary Artery: There is mild pulmonary hypertension. The estimated pulmonary artery systolic pressure is 40 mmHg.    IVC/SVC: Intermediate venous pressure at 8 mmHg.        Hypertension  Hypertension Medications                     amLODIPine (NORVASC) 5 MG tablet TAKE 1 TABLET BY MOUTH EVERY DAY     carvediloL (COREG) 6.25 MG tablet Take 1 tablet (6.25 mg total) by mouth 2 (two) times daily with meals.     hydroCHLOROthiazide (HYDRODIURIL) 25 MG tablet Take 1 tablet (25 mg total) by mouth once daily.       -Will restart home meds as tolerated    Bilateral carotid artery stenosis  April 2022     No evidence of hemodynamically significant stenosis is demonstrated in the extracranial carotid arteries.  Patent right carotid stent    Renal/  Hypernatremia  In the setting of decreased PO intake for multiple days, 1.5L deficit. Received gentle IVF, now advanced to CLD. Will prioritize PO intake. Currently improving.    Oncology  Acute blood loss anemia  See acute lower GIB    Leukocytosis  Leukocytosis on admission, initially thought to be acute phase reactant to GIB as patient afebrile. S/p 5 days IV Zosyn to cover for intraabdominal infection. Leukocytosis jumped from 13 to 25 after new ileus noted on abdominal imaging.     - Restart Zosyn for empiric intraabdominal coverage - day 3  - Leukocytosis down trending    GI  * Acute lower GI bleeding  73 year old female with multiple medical problems presenting with melena and some bright red blood with associated pre-syncope found to have hgb of 5.9 (from baseline ~10). CTA with acute gastrointestinal bleed at the level of the cecum. Patient had embolization by IR (5/14) but continued to to have larger volume dark red blood in stool with falling hgb, repeat CTA without active bleed. S/p 9U pRBCs, 1 FFP, 1 platelet. Colonoscopy performed 5/16 with no evidence of active bleed, old blood and clots evacuated. Tolerated advanced diet and stepped down on 5/22. Patient stepped back up to MICU 5/23 after ileus noted on KUB, NG tube placed, concern for GIB with dark stools however Hgb remained stable. Dark NG tube output, likely bilious as opposed to blood. NG tube removed 5/25.    -- diet NPO except  ice chips and sips with meds  -- Hgb stable, if down-trends will reach out to GI  -- Protonix 40 mg IV BID  -- Hold all A/C and A/P agents, on only DVT prophylaxis  -- Correct any coagulopathy with platelets and FFP for goal of platelets > 50K and INR <2.0   -- Maintain large bore IV access   -- MAP > 65 goal   -- Maintain type and screen   -- Trend CBCs    Incarcerated inguinal hernia  See bowel ischemia    Ischemia, bowel  S/p ex lap 5/17 with incarcerated hernia repair with bowel resection per general surgery    Ileus  Noted on CT A/P. Previously thought to have resolved. KUB concerning for continued ileus vs pSBO. S/p NGT with significant output.    -NGT removed  -NPO with ice chips, advance slowly    Orthopedic  Left leg pain  Patient reporting left left pain and weakness associated with pain. Patient has known severe atherosclerosis which may be associated with pain/PAD. Patient has remote history of left hip fracture. Lower extremity ultrasounds and L spine, pelvis, left hip, left femur, left knee, left tib/fib xrays unrevealing.    - PT/OT  - PT recommending rehab, PM&R consulted  - Lidocaine lotion PRN  - Neurology consultation at discharge for EMG to rule out nerve damage    Gout  Patient unable to take gout prophylactic home meds while NPO. Experiencing gout flare 5/25, left foot. NSAIDs contraindicated with GIB. Avoiding opioids in setting of ileus. No IV uric acid reducing medications on formulary.     - Per surg, okay with PO meds  - Colchicine 1.2mg, followed by 0.6mg 1 hour later for gout flare  - Restart home prophylactic medications  - Voltaren gel   - Ice PRN  - Would like to avoid steroids if possible    Other  History Situational (ie Stress Induced) syncope ([facility] admission 12-25-23  History of syncope. Thought to be reflex mediated. Had pre-syncopal episode at home after having diarrhea but before she noticed active bleeding.     Acute hypoxic on chronic hypercapnic respiratory  failure  Patient with Hypercapnic and Hypoxic Respiratory failure which is Acute.  she is on home oxygen at 2 LPM. Signs/symptoms of respiratory failure include- respiratory distress. Contributing diagnoses includes - COPD and oversedation.  Labs and images were reviewed. Patient Has recent ABG, which has been reviewed. Will treat underlying causes and adjust management of respiratory failure as follows: Patient required intermittent short term BIPAP, now satting well on low flow O2 NC.    - Home inhalers  - IS    Impaired mobility  See left leg pain       Critical Care Daily Checklist:    A: Awake: RASS Goal/Actual Goal: RASS Goal: 0-->alert and calm  Actual:     B: Spontaneous Breathing Trial Performed?     C: SAT & SBT Coordinated?  na                      D: Delirium: CAM-ICU Overall CAM-ICU: Negative   E: Early Mobility Performed? Yes   F: Feeding Goal:    Status:     Current Diet Order   Procedures    Diet NPO Except for: Ice Chips, Sips with Medication, Medication     Order Specific Question:   Except for     Answer:   Ice Chips     Order Specific Question:   Except for     Answer:   Sips with Medication     Order Specific Question:   Except for     Answer:   Medication      AS: Analgesia/Sedation na   T: Thromboembolic Prophylaxis GIB   H: HOB > 300 Yes   U: Stress Ulcer Prophylaxis (if needed) PPI   G: Glucose Control na   B: Bowel Function Stool Occurrence: 1   I: Indwelling Catheter (Lines & Maldonado) Necessity PIV   D: De-escalation of Antimicrobials/Pharmacotherapies Zosyn    Plan for the day/ETD SD    Code Status:  Family/Goals of Care: Full Code        Critical care was time spent personally by me on the following activities: development of treatment plan with patient or surrogate and bedside caregivers, discussions with consultants, evaluation of patient's response to treatment, examination of patient, ordering and performing treatments and interventions, ordering and review of laboratory studies,  ordering and review of radiographic studies, pulse oximetry, re-evaluation of patient's condition. This critical care time did not overlap with that of any other provider or involve time for any procedures.     Alesia Archer MD  Critical Care Medicine  [facility] - Cardiac Medical ICU

## 2024-05-26 NOTE — SUBJECTIVE & OBJECTIVE
Interval History/Significant Events: Ms. Floyd is doing well today. Po ice chips and meds ok per surg. Will treat for gout flare. Stable for stepdown.    Review of Systems  Objective:     Vital Signs (Most Recent):  Temp: 98.2 °F (36.8 °C) (05/26/24 0705)  Pulse: 80 (05/26/24 0754)  Resp: 18 (05/26/24 0754)  BP: (!) 147/72 (05/26/24 0705)  SpO2: 99 % (05/26/24 0754) Vital Signs (24h Range):  Temp:  [97.3 °F (36.3 °C)-98.2 °F (36.8 °C)] 98.2 °F (36.8 °C)  Pulse:  [] 80  Resp:  [14-44] 18  SpO2:  [95 %-100 %] 99 %  BP: (114-185)/() 147/72   Weight: 52.6 kg (115 lb 15.4 oz)  Body mass index is 20.54 kg/m².      Intake/Output Summary (Last 24 hours) at 5/26/2024 0818  Last data filed at 5/26/2024 0615  Gross per 24 hour   Intake 2892.24 ml   Output 500 ml   Net 2392.24 ml          Physical Exam  Vitals and nursing note reviewed.   Constitutional:       General: She is not in acute distress.  HENT:      Nose: No congestion or rhinorrhea.      Mouth/Throat:      Mouth: Mucous membranes are moist.   Eyes:      General:         Right eye: No discharge.         Left eye: No discharge.   Cardiovascular:      Rate and Rhythm: Regular rhythm. Tachycardia present.      Pulses: Normal pulses.      Heart sounds: No murmur heard.  Pulmonary:      Effort: Pulmonary effort is normal. No respiratory distress.      Breath sounds: Normal breath sounds. No wheezing.   Abdominal:      General: Abdomen is flat. There is no distension.      Palpations: Abdomen is soft.      Tenderness: There is no abdominal tenderness.      Comments: Abdominal incision healing well. Clean/dry/no drainage.   Musculoskeletal:      Cervical back: No rigidity.      Right lower leg: No edema.      Left lower leg: No edema.   Neurological:      General: No focal deficit present.      Mental Status: She is alert and oriented to person, place, and time.   Psychiatric:         Mood and Affect: Mood normal.         Behavior: Behavior normal.             Vents:  Oxygen Concentration (%): 0.5 (05/22/24 1315)  Lines/Drains/Airways       Drain  Duration             Female External Urinary Catheter w/ Suction 05/24/24 0900 1 day              Peripheral Intravenous Line  Duration                  Peripheral IV - Single Lumen 05/24/24 1750 20 G;1 3/4 in Anterior;Right Forearm 1 day         Peripheral IV - Single Lumen 05/24/24 1805 20 G;1 3/4 in Anterior;Right Upper Arm 1 day                  Significant Labs:    CBC/Anemia Profile:  Recent Labs   Lab 05/24/24 2025 05/25/24  0732 05/25/24 2048   WBC 16.54* 13.14* 13.03*   HGB 8.1* 8.4* 8.1*   HCT 26.8* 27.3* 26.1*    360 395   MCV 99* 98 97   RDW 18.0* 18.0* 17.6*        Chemistries:  Recent Labs   Lab 05/25/24  0302 05/26/24  0323    135*   K 3.4* 3.5    103   CO2 24 22*   BUN 7* 5*   CREATININE 0.7 0.7   CALCIUM 7.7* 7.5*   ALBUMIN 1.5* 1.6*   PROT 4.8* 4.9*   BILITOT 0.4 0.5   ALKPHOS 65 65   ALT 36 35   AST 52* 46*   MG 2.0 1.5*   PHOS 2.5* 3.0         Significant Imaging:  I have reviewed all pertinent imaging results/findings within the past 24 hours.

## 2024-05-26 NOTE — SUBJECTIVE & OBJECTIVE
Interval History: NAEON. HDS. NGT removed yesterday without issue. She had a BM this morning. Denies nausea or vomiting. Tolerated ice chips. No abdominal pain.     Medications:  Continuous Infusions:  Scheduled Meds:   allopurinoL  200 mg Oral Daily    amLODIPine  5 mg Oral Daily    [START ON 5/27/2024] colchicine  0.6 mg Oral Daily    cyanocobalamin  1,000 mcg Intramuscular Weekly    diclofenac sodium  2 g Topical (Top) BID    fluticasone furoate-vilanteroL  1 puff Inhalation Daily    pantoprazole  40 mg Intravenous BID    piperacillin-tazobactam (Zosyn) IV (PEDS and ADULTS) (extended infusion is not appropriate)  4.5 g Intravenous Q8H    potassium chloride  10 mEq Intravenous Q1H    QUEtiapine  50 mg Oral QHS    tiotropium bromide  2 puff Inhalation Daily     PRN Meds:  Current Facility-Administered Medications:     sodium chloride 0.9%, , Intravenous, PRN    acetaminophen, 650 mg, Oral, Q6H PRN    albuterol-ipratropium, 3 mL, Nebulization, Q4H PRN    aluminum & magnesium hydroxide-simethicone, 30 mL, Oral, Q6H PRN    LIDOcaine, , Topical (Top), BID PRN    ondansetron, 4 mg, Intravenous, Q4H PRN    sodium chloride, 1 spray, Each Nostril, PRN    sodium chloride 0.9%, 10 mL, Intravenous, PRN     Review of patient's allergies indicates:  No Known Allergies  Objective:     Vital Signs (Most Recent):  Temp: 98.2 °F (36.8 °C) (05/26/24 0705)  Pulse: 83 (05/26/24 0905)  Resp: (!) 21 (05/26/24 0905)  BP: (!) 150/80 (05/26/24 0905)  SpO2: 100 % (05/26/24 0905) Vital Signs (24h Range):  Temp:  [97.3 °F (36.3 °C)-98.2 °F (36.8 °C)] 98.2 °F (36.8 °C)  Pulse:  [75-98] 83  Resp:  [14-43] 21  SpO2:  [95 %-100 %] 100 %  BP: (114-185)/(66-99) 150/80     Weight: 52.6 kg (115 lb 15.4 oz)  Body mass index is 20.54 kg/m².    Intake/Output - Last 3 Shifts         05/24 0700 05/25 0659 05/25 0700 05/26 0659 05/26 0700 05/27 0659    P.O.       I.V. (mL/kg) 1928.4 (36.7) 1995.7 (37.9)     NG/GT       IV Piggyback 636.2 896.5     Total  Intake(mL/kg) 2564.7 (48.8) 2892.2 (55)     Urine (mL/kg/hr)  600 (0.5)     Drains 500      Other  0     Total Output 500 600     Net +2064.7 +2292.2            Urine Occurrence 1 x 3 x 2 x    Stool Occurrence 1 x             Physical Exam  Vitals and nursing note reviewed.   Constitutional:       General: She is not in acute distress.     Appearance: She is well-developed. She is not diaphoretic.      Interventions: Nasal cannula in place.      Comments: O2 via NC   HENT:      Head: Normocephalic and atraumatic.      Mouth/Throat:      Mouth: Mucous membranes are moist.      Pharynx: Oropharynx is clear. No oropharyngeal exudate.   Eyes:      General: No scleral icterus.     Extraocular Movements: Extraocular movements intact.   Cardiovascular:      Rate and Rhythm: Normal rate.   Pulmonary:      Effort: Pulmonary effort is normal. No respiratory distress.   Abdominal:      Comments:   Improving abdominal distention that remains soft and NTD   Incision is clean, dry, and intact without drainage, erythema, or increased warmth.   Skin:     General: Skin is warm and dry.   Neurological:      General: No focal deficit present.      Mental Status: She is alert and oriented to person, place, and time.      Cranial Nerves: No cranial nerve deficit.      Significant Labs:  I have reviewed all pertinent lab results within the past 24 hours.    Significant Diagnostics:  I have reviewed all pertinent imaging results/findings within the past 24 hours.

## 2024-05-26 NOTE — ASSESSMENT & PLAN NOTE
Patient unable to take gout prophylactic home meds while NPO. Experiencing gout flare 5/25, left foot. NSAIDs contraindicated with GIB. Avoiding opioids in setting of ileus. No IV uric acid reducing medications on formulary.     - Per surg, okay with PO meds  - Colchicine 1.2mg, followed by 0.6mg 1 hour later for gout flare  - Restart home prophylactic medications  - Voltaren gel   - Ice PRN  - Would like to avoid steroids if possible

## 2024-05-26 NOTE — PROGRESS NOTES
[facility] - Cardiac Medical ICU  General Surgery  Progress Note    Subjective:     History of Present Illness:  Jessica Floyd is a 73 y.o. female with a history of HTN, HFpEF, CKD3, GERD, and CAD who presented to the ED on 5/14/24 with melena. She is altered on my interview and so the history was collected from chart review and the family. Appears she was feeling light headed and as though she was going to pass out. She also noted hematochezia. Upon arrival she was AF, tachycardic, and hypotensive. CTA was concerning for GIB and so she was taken to IR where she was found to have extravasation from the ileal branch of the SMA. They performed a coil embolization of a tertiary branch. Following this, she was reportedly doing well from a mentation standpoint, but has continued to require blood transfusion. She had an EGD on 5/15 and C-scope on 5/16 neither of which revealed a source for her bleed although GI was not able to reach the cecum due to tortuosity. Over the last couple of days, she has had worsening abdominal pain and she is not able to participate with my interview due to AMS. Her family feels this is pain related although she has been received narcotics and ativan PRN.      She had a low grade fever to 100.6 earlier today. Currently tachycardic to the 100s but HDS. Her CBC is notable for WBC 40 (stable from prior) and H/H 8.6/25.5. her CMP shows a 2 bili of 2.2 but is otherwise unremarkable. Lactic acid is normal at 1.3. She had a repeat CT A/P today which showed dilated bowel consistent with an ileus. However, per her family and the primary team, her pain seems out of proportion to the findings.     Post-Op Info:  Procedure(s) (LRB):  LAPAROSCOPY, DIAGNOSTIC (N/A)  LAPAROTOMY, EXPLORATORY (N/A)  EXCISION, SMALL INTESTINE (N/A)  REPAIR, HERNIA, INGUINAL (Bilateral)   9 Days Post-Op     Interval History: NAEON. HDS. NGT removed yesterday without issue. She had a BM this morning. Denies nausea or vomiting.  Tolerated ice chips. No abdominal pain.     Medications:  Continuous Infusions:  Scheduled Meds:   allopurinoL  200 mg Oral Daily    amLODIPine  5 mg Oral Daily    [START ON 5/27/2024] colchicine  0.6 mg Oral Daily    cyanocobalamin  1,000 mcg Intramuscular Weekly    diclofenac sodium  2 g Topical (Top) BID    fluticasone furoate-vilanteroL  1 puff Inhalation Daily    pantoprazole  40 mg Intravenous BID    piperacillin-tazobactam (Zosyn) IV (PEDS and ADULTS) (extended infusion is not appropriate)  4.5 g Intravenous Q8H    potassium chloride  10 mEq Intravenous Q1H    QUEtiapine  50 mg Oral QHS    tiotropium bromide  2 puff Inhalation Daily     PRN Meds:  Current Facility-Administered Medications:     sodium chloride 0.9%, , Intravenous, PRN    acetaminophen, 650 mg, Oral, Q6H PRN    albuterol-ipratropium, 3 mL, Nebulization, Q4H PRN    aluminum & magnesium hydroxide-simethicone, 30 mL, Oral, Q6H PRN    LIDOcaine, , Topical (Top), BID PRN    ondansetron, 4 mg, Intravenous, Q4H PRN    sodium chloride, 1 spray, Each Nostril, PRN    sodium chloride 0.9%, 10 mL, Intravenous, PRN     Review of patient's allergies indicates:  No Known Allergies  Objective:     Vital Signs (Most Recent):  Temp: 98.2 °F (36.8 °C) (05/26/24 0705)  Pulse: 83 (05/26/24 0905)  Resp: (!) 21 (05/26/24 0905)  BP: (!) 150/80 (05/26/24 0905)  SpO2: 100 % (05/26/24 0905) Vital Signs (24h Range):  Temp:  [97.3 °F (36.3 °C)-98.2 °F (36.8 °C)] 98.2 °F (36.8 °C)  Pulse:  [75-98] 83  Resp:  [14-43] 21  SpO2:  [95 %-100 %] 100 %  BP: (114-185)/(66-99) 150/80     Weight: 52.6 kg (115 lb 15.4 oz)  Body mass index is 20.54 kg/m².    Intake/Output - Last 3 Shifts         05/24 0700  05/25 0659 05/25 0700 05/26 0659 05/26 0700 05/27 0659    P.O.       I.V. (mL/kg) 1928.4 (36.7) 1995.7 (37.9)     NG/GT       IV Piggyback 636.2 896.5     Total Intake(mL/kg) 2564.7 (48.8) 2892.2 (55)     Urine (mL/kg/hr)  600 (0.5)     Drains 500      Other  0     Total Output  500 600     Net +2064.7 +2292.2            Urine Occurrence 1 x 3 x 2 x    Stool Occurrence 1 x             Physical Exam  Vitals and nursing note reviewed.   Constitutional:       General: She is not in acute distress.     Appearance: She is well-developed. She is not diaphoretic.      Interventions: Nasal cannula in place.      Comments: O2 via NC   HENT:      Head: Normocephalic and atraumatic.      Mouth/Throat:      Mouth: Mucous membranes are moist.      Pharynx: Oropharynx is clear. No oropharyngeal exudate.   Eyes:      General: No scleral icterus.     Extraocular Movements: Extraocular movements intact.   Cardiovascular:      Rate and Rhythm: Normal rate.   Pulmonary:      Effort: Pulmonary effort is normal. No respiratory distress.   Abdominal:      Comments:   Improving abdominal distention that remains soft and NTD   Incision is clean, dry, and intact without drainage, erythema, or increased warmth.   Skin:     General: Skin is warm and dry.   Neurological:      General: No focal deficit present.      Mental Status: She is alert and oriented to person, place, and time.      Cranial Nerves: No cranial nerve deficit.      Significant Labs:  I have reviewed all pertinent lab results within the past 24 hours.    Significant Diagnostics:  I have reviewed all pertinent imaging results/findings within the past 24 hours.  Assessment/Plan:     * Acute lower GI bleeding  Jessica Floyd is a 73 y.o. female who presented with a GIB s/p IR embolization of a tertiary branch of the ileal artery now with severe LLQ pain. She is now s/p diagnostic laparotomy converted to ex lap with small bowel resection and primary tissue repair of bilateral inguinal hernias 5/17. Previously with ROBF and tolerating diet, now with emesis and dilated bowel concerning for ileus s/p NGT decompression.     - Ok to advance to clear liquid diet   - Hgb stable  - Aspiration precautions  - Replete lytes PRN   - K>4, Mag>2, Phos>3  - Abx per  primary  - Okay for PT/OT. Would recommend early mobilization as tolerated  - Remainder of care per primary team  - Please contact general surgery with any questions, concerns, or clinical status changes    Post Op Instructions:  Follow up in general surgery clinic in 2 weeks. Message sent to clinic for scheduling. Clinic # is [phone]  No heavy lifting (>10lbs or a gallon of milk) for 6 weeks from day of surgery.   No pushing or pulling activities such as vacuuming or raking.   Activity as tolerated.   Patient can shower, allow water to run over incisions. No soaking in bath or pools for 2 weeks. Do not pick at surgical glue, it will come off on its own.   You have been prescribed a narcotic pain medication. No driving while taking narcotics and until you can react quickly without pain. Please wean narcotic over the next few days. You may alternate tylenol and ibuprofen for additional pain control.  No straining, avoid constipation. May take OTC stool softeners as described and laxatives as needed.              Arlene Lee MD  General Surgery  [facility] - Cardiac Medical ICU

## 2024-05-26 NOTE — ASSESSMENT & PLAN NOTE
Leukocytosis on admission, initially thought to be acute phase reactant to GIB as patient afebrile. S/p 5 days IV Zosyn to cover for intraabdominal infection. Leukocytosis jumped from 13 to 25 after new ileus noted on abdominal imaging.     - Restart Zosyn for empiric intraabdominal coverage - day 3  - Leukocytosis down trending

## 2024-05-26 NOTE — ASSESSMENT & PLAN NOTE
Noted on CT A/P. Previously thought to have resolved. KUB concerning for continued ileus vs pSBO. S/p NGT with significant output.    -NGT removed  -NPO with ice chips, advance slowly

## 2024-05-26 NOTE — PLAN OF CARE
MICU DAILY GOALS     Family/Goals of care/Code Status   Code Status: Full Code    24H Vital Sign Range  Temp:  [97.3 °F (36.3 °C)-98.2 °F (36.8 °C)]   Pulse:  [75-98]   Resp:  [14-43]   BP: (114-185)/(66-96)   SpO2:  [92 %-100 %]      Shift Events (include procedures and significant events)   Diet upgraded to liquid tolerated well. All oral meds tolerated well.     AWAKE RASS: Goal - RASS Goal: 0-->alert and calm  Actual - RASS ([place] Agitation-Sedation Scale): alert and calm    Restraint necessity: Not necessary   BREATHE SBT: Not intubated    Coordinate A & B, analgesics/sedatives Pain: managed   SAT: Not intubated   Delirium CAM-ICU: Overall CAM-ICU: Negative   Early(intubated/ Progressive (non-intubated) Mobility MOVE Screen (INTUBATED ONLY): Not intubated    Activity: Activity Management: Arm raise - L1, Rolling - L1   Feeding/Nutrition Diet order: Diet/Nutrition Received: NPO, ice chips, sips of water,     Thrombus DVT prophylaxis: VTE Required Core Measure: Pharmacological prophylaxis initiated/maintained   HOB Elevation Head of Bed (HOB) Positioning: HOB at 30-45 degrees   Ulcer Prophylaxis GI: yes   Glucose control N/a      Skin Skin assessed during:     Sacrum intact/not altered?   Heels intact/not altered?   Surgical wound?     CHECK ONE!   (no altered skin or altered skin) and sub boxes:  [] No Altered Skin Integrity Present    []Prevention Measures Documented    [x] Altered Skin Integrity Present or Discovered   [x] LDA present in EPIC, daily doc completed              [] LDA added if not in EPIC (describe wound).                    When describing wound, do not stage, use descriptive words only.    [] Wound Image Taken (required on admit,                   transfer/discharge and every Tuesday)    Wound Care Consulted? No    4 EYES:  Attending Nurse (1st set of eyes):     Second RN/Staff Member (2nd set of eyes):    Bowel Function diarrhea    Indwelling Catheter Necessity [REMOVED]      Urethral  Catheter 05/15/24 1600-Reason for Continuing Urinary Catheterization: Critically ill in ICU and requiring hourly monitoring of intake/output       External cath    De-escalation Antibiotics No        VS and assessment per flow sheet, patient progressing towards goals as tolerated, plan of care reviewed with family, all concerns addressed, will continue to monitor.

## 2024-05-26 NOTE — PT/OT/SLP RE-EVAL
Physical Therapy Re-evaluation and treatment    Patient Name:  Jessica Floyd   MRN:  [id number]    Recommendations:     Discharge Recommendations: High Intensity Therapy  Discharge Equipment Recommendations: none   Barriers to discharge: Decreased caregiver support    Assessment:     Jessica Floyd is a 73 y.o. female admitted with a medical diagnosis of Acute lower GI bleeding.  She presents with the following impairments/functional limitations: weakness, impaired endurance, impaired functional mobility, gait instability, impaired balance, decreased safety awareness, decreased lower extremity function pt tolerated treatment well but reports of anxiety decreased functional mobility. Pt will benefit from skilled PT 4x/wk to progress physically. Pt was evaluated 5/19 being s/p exp lap, excision small intestines, inguinal hernia repair 5/17. Pt was transferred to ICU 5/23 due to melanic stool and decreased Hgb.    SOCIAL:pt lives with her retired  in 1 story with 3 steps and B handrails. Pt recently began using rollator. Pt owns sc, w/c, O2 (uses PRN) raised toilet seat, rollator, has walk in shower with built in bench and grab bars. Pt  will assist after discharge.     Rehab Prognosis:  good ; patient would benefit from acute skilled PT services to address these deficits and reach maximum level of function.      Recent Surgery: Procedure(s) (LRB):  LAPAROSCOPY, DIAGNOSTIC (N/A)  LAPAROTOMY, EXPLORATORY (N/A)  EXCISION, SMALL INTESTINE (N/A)  REPAIR, HERNIA, INGUINAL (Bilateral) 9 Days Post-Op    Plan:     During this hospitalization, patient to be seen 4 x/week to address the above listed problems via gait training, therapeutic activities, therapeutic exercises  Plan of Care Expires:  06/23/24  Plan of Care Reviewed with: patient, daughter    Subjective     Communicated with nurse  prior to session.  Patient found up in chair with telemetry, pulse ox (continuous), blood pressure cuff, oxygen, PureWick,  peripheral IV upon PT entry to room, agreeable to evaluation.      Chief Complaint: pt stated that she has anxiety and will pass out if she get anxious.   Patient comments/goals: to get better and go home.   Pain/Comfort:  Pain Rating 1: 0/10  Pain Rating Post-Intervention 1: 0/10    Patients cultural, spiritual, Sikh conflicts given the current situation: no      Objective:     Patient found with: telemetry, pulse ox (continuous), blood pressure cuff, oxygen, PureWick, peripheral IV     General Precautions: Standard, fall  Orthopedic Precautions: N/A  Braces: N/A  Respiratory Status: Nasal cannula, flow 1 L/min    Exams:  Cognitive Exam:  Patient is oriented to Person, Place, Time, and Situation  RLE ROM: WFL  RLE Strength: WFL  LLE ROM: WFL  LLE Strength: 3/5 for major muscle groups.     Functional Mobility:  Transfers:  pt needed verbal cues for hand placement and sequencing for RW usage.  Pt wanted additional assist of 1 in room during treatment.   Sit to Stand:  contact guard assistance with rolling walker    Gait: pt received gait training ~ 3 steps forward with RW, O2 and CGA. Pt was followed by chair for safety.     Balance: pt stood with CGA and RW for support.     Pt white board updated with current therapists name and level of mobility assistance needed.       AM-PAC 6 CLICK MOBILITY  Total Score:16       Treatment and Education:   Pt and daughter's received verbal instructions in role of PT and POC. All verbally expressed understanding of such.     Patient left up in chair with all lines intact, call button in reach, RN  notified, and daughters present.    GOALS:   Multidisciplinary Problems       Physical Therapy Goals          Problem: Physical Therapy    Goal Priority Disciplines Outcome Goal Variances Interventions   Physical Therapy Goal     PT, PT/OT Progressing     Description: Goals to be met by: 2024    Patient will increase functional independence with mobility by performin.  Supine to sit with Minimal Assistance   2. Sit to stand transfer with modified Independent using RW  3. Gait  x 50 feet with Contact Guard Assistance using Rolling Walker.   4. Ascend/descend 3 stair with bilateral Handrails Contact Guard Assistance using RW.   5. Pt perform AROM there exer LLE x 15 reps to increase strength for increased mobility.                          History:     Past Medical History:   Diagnosis Date    Allergic rhinitis     Amblyopia     Carotid stenosis     Coronary atherosclerosis     Outside [facility] 6/2014: 20% mLAD, 50% mCx, 20%, mRCA    Dyslipidemia     ESBL (extended spectrum beta-lactamase) producing bacteria infection     UTI    Hypertension     Nausea and vomiting 05/26/2017    Pulmonary emphysema 10/28/2023    SAH (subarachnoid hemorrhage) 08/24/2016 1999, s/p clipping    Subarachnoid hemorrhage due to ruptured aneurysm 1999       Past Surgical History:   Procedure Laterality Date    ADENOIDECTOMY      ANTERIOR CRUCIATE LIGAMENT REPAIR  2012    APPENDECTOMY      CATARACT EXTRACTION W/  INTRAOCULAR LENS IMPLANT Right 11/07/2022    Procedure: EXTRACTION, CATARACT, WITH IOL INSERTION;  Surgeon: Higinio Cristina MD;  Location: [facility];  Service: Ophthalmology;  Laterality: Right;    CATARACT EXTRACTION W/  INTRAOCULAR LENS IMPLANT Left 11/21/2022    Procedure: EXTRACTION, CATARACT, WITH IOL INSERTION;  Surgeon: Higinio Cristina MD;  Location: [facility];  Service: Ophthalmology;  Laterality: Left;    CEREBRAL ANEURYSM REPAIR  1999    clip    COLONOSCOPY N/A 5/16/2024    Procedure: COLONOSCOPY;  Surgeon: Rich Syed MD;  Location: [address]);  Service: Endoscopy;  Laterality: N/A;    DENTAL SURGERY      DIAGNOSTIC LAPAROSCOPY N/A 5/17/2024    Procedure: LAPAROSCOPY, DIAGNOSTIC;  Surgeon: Ruben Oscar MD;  [facility];  Service: General;  Laterality: N/A;    ESOPHAGOGASTRODUODENOSCOPY N/A 5/15/2024    Procedure: EGD (ESOPHAGOGASTRODUODENOSCOPY);  Surgeon:  Rich Syed MD;  Location: [facility] (2ND FLR);  Service: Endoscopy;  Laterality: N/A;    EXCISION, SMALL INTESTINE N/A 5/17/2024    Procedure: EXCISION, SMALL INTESTINE;  Surgeon: Ruben Oscar MD;  [facility];  Service: General;  Laterality: N/A;    EYE SURGERY Bilateral     cataract removal and lens implants    HIP ARTHROPLASTY Left 05/28/2023    Procedure: TOTAL HIP ARTHROPLASTY;  Surgeon: Juan Wna MD;  Location: [address];  Service: Orthopedics;  Laterality: Left;    LAPAROTOMY, EXPLORATORY N/A 5/17/2024    Procedure: LAPAROTOMY, EXPLORATORY;  Surgeon: Ruben Oscar MD;  Location: [facility] OR [address] [place] [address];  Service: General;  Laterality: N/A;    REPAIR, HERNIA, INGUINAL Bilateral 5/17/2024    Procedure: REPAIR, HERNIA, INGUINAL;  Surgeon: Ruben Oscar MD;  Location: [address];  Service: General;  Laterality: Bilateral;    TONSILLECTOMY         Time Tracking:     PT Received On: 05/26/24  PT Start Time: 1124     PT Stop Time: 1140  PT Total Time (min): 16 min     Billable Minutes: Re-eval 8 min  and Gait Training 8 min       05/26/2024

## 2024-05-26 NOTE — RESIDENT HANDOFF
"Handoff     Primary Team: [facility]AM 1 Room Number: [id number] A     Patient Name: Jessica Floyd MRN: [id number]     Date of Birth: [id number] Allergies: Patient has no known allergies.     Age: 73 y.o. Admit Date: 5/14/2024     Sex: female  BMI: Body mass index is 20.54 kg/m².     Code Status: Full Code        Illness Level (current clinical status): Watcher - No    Reason for Admission: Acute lower GI bleeding    Brief HPI (pertinent PMH and diagnosis or differential diagnosis): Mrs. Floyd is a 73 year old female with PMH notable for COPD home O2 PRN, pulm HTN, R carotid stent on ASA, HFpEF, anemia, and ERIK on infusions who presented to [facility] with the melena. Patient reports taking aspirin daily but no DOAC. Hemoglobin 5.9 at presentation to ED (baseline ~10). GI and IR consulted given concern for active bleed seen on CTA. In the emergency department she was given 1L IVF and a 80 mg IV protonix bolus. Critical care medicine. consulted for GIB. CTA in the Ed revealed "Acute gastrointestinal bleed at the level of the cecum".    Procedure Date: 5/14 embolization, 5/17 ex lap/bowel resection    Hospital Course (updated, brief assessment by system or problem, significant events): 83 GEN admitted to ICU for lower GIB s/p embolization with IR 5/14 for active cecal bleed. Course complicated by worsening abdominal pain, general surgery consulted for further evaluation. Patient underwent ex lap 5/17 and found to have necrotic bowel at site of incarcerated inguinal hernia. Anemia stable, advanced to regular diet, on 1L NC. Patient was stepped down to medicine 5/22. On 5/23 the patient reported worsening abdominal pain and distension. KUB was obtained concerning for ileus vs partial ABO, at which point surgery was notified. NG tube with significant output. She stepped back up to MICU 5/23. Hgb stable, no further episodes of dark stools. Abdominal exam with significant improvement after NG tube drainage. NG tube " removed 5/25, now tolerating clears and PO meds. Stable for stepdown.    Tasks (specific, using if-then statements): Advance diet per gen surg recommendations. Avoid NSAIDS/opioids. Patient experiencing gout flare as she was unable to take prophylactic medications while NPO. Colchicine flare dosing initiated today. Continue/restart home meds as able.    Contingency Plan (special circumstances anticipated and plan): If any acute change in abdominal examination, stat KUB vs CT abdomen, gen surg following.    Estimated Discharge Date: 5/29    Discharge Disposition: Rehab Facility    Mentored By: Dr. Giles

## 2024-05-26 NOTE — PLAN OF CARE
Problem: Physical Therapy  Goal: Physical Therapy Goal  Description: Goals to be met by: 2024    Patient will increase functional independence with mobility by performin. Supine to sit with Minimal Assistance   2. Sit to stand transfer with modified Independent using RW  3. Gait  x 50 feet with Contact Guard Assistance using Rolling Walker.   4. Ascend/descend 3 stair with bilateral Handrails Contact Guard Assistance using RW.   5. Pt perform AROM there exer LLE x 15 reps to increase strength for increased mobility.     Outcome: Progressing   Re-evaluation completed and goals appropriate. 2024

## 2024-05-26 NOTE — ASSESSMENT & PLAN NOTE
Jessica Floyd is a 73 y.o. female who presented with a GIB s/p IR embolization of a tertiary branch of the ileal artery now with severe LLQ pain. She is now s/p diagnostic laparotomy converted to ex lap with small bowel resection and primary tissue repair of bilateral inguinal hernias 5/17. Previously with ROBF and tolerating diet, now with emesis and dilated bowel concerning for ileus s/p NGT decompression.     - Ok to advance to clear liquid diet   - Hgb stable  - Aspiration precautions  - Replete lytes PRN   - K>4, Mag>2, Phos>3  - Abx per primary  - Okay for PT/OT. Would recommend early mobilization as tolerated  - Remainder of care per primary team  - Please contact general surgery with any questions, concerns, or clinical status changes    Post Op Instructions:  Follow up in general surgery clinic in 2 weeks. Message sent to clinic for scheduling. Clinic # is [phone]  No heavy lifting (>10lbs or a gallon of milk) for 6 weeks from day of surgery.   No pushing or pulling activities such as vacuuming or raking.   Activity as tolerated.   Patient can shower, allow water to run over incisions. No soaking in bath or pools for 2 weeks. Do not pick at surgical glue, it will come off on its own.   You have been prescribed a narcotic pain medication. No driving while taking narcotics and until you can react quickly without pain. Please wean narcotic over the next few days. You may alternate tylenol and ibuprofen for additional pain control.  No straining, avoid constipation. May take OTC stool softeners as described and laxatives as needed.

## 2024-05-26 NOTE — ASSESSMENT & PLAN NOTE
73 year old female with multiple medical problems presenting with melena and some bright red blood with associated pre-syncope found to have hgb of 5.9 (from baseline ~10). CTA with acute gastrointestinal bleed at the level of the cecum. Patient had embolization by IR (5/14) but continued to to have larger volume dark red blood in stool with falling hgb, repeat CTA without active bleed. S/p 9U pRBCs, 1 FFP, 1 platelet. Colonoscopy performed 5/16 with no evidence of active bleed, old blood and clots evacuated. Tolerated advanced diet and stepped down on 5/22. Patient stepped back up to MICU 5/23 after ileus noted on KUB, NG tube placed, concern for GIB with dark stools however Hgb remained stable. Dark NG tube output, likely bilious as opposed to blood. NG tube removed 5/25.    -- diet NPO except ice chips and sips with meds  -- Hgb stable, if down-trends will reach out to GI  -- Protonix 40 mg IV BID  -- Hold all A/C and A/P agents, on only DVT prophylaxis  -- Correct any coagulopathy with platelets and FFP for goal of platelets > 50K and INR <2.0   -- Maintain large bore IV access   -- MAP > 65 goal   -- Maintain type and screen   -- Trend CBCs

## 2024-05-26 NOTE — PLAN OF CARE
MICU DAILY GOALS     Family/Goals of care/Code Status   Code Status: Full Code    24H Vital Sign Range  Temp:  [97.3 °F (36.3 °C)-97.8 °F (36.6 °C)]   Pulse:  []   Resp:  [14-44]   BP: (114-185)/()   SpO2:  [95 %-100 %]      Shift Events (include procedures and significant events)  No acute events throughout shift. Restarted home Amlodipine and Seroquel. Tolerated PO meds. Potassium, Magnesium, Phosphorus replaced.    AWAKE RASS: Goal - RASS Goal: 0-->alert and calm  Actual - RASS ([place] Agitation-Sedation Scale): alert and calm    Restraint necessity: Not necessary   BREATHE SBT: Not intubated    Coordinate A & B, analgesics/sedatives Pain: managed   SAT: Not intubated   Delirium CAM-ICU: Overall CAM-ICU: Negative   Early(intubated/ Progressive (non-intubated) Mobility MOVE Screen (INTUBATED ONLY): Not intubated    Activity: Activity Management: Rolling - L1, Arm raise - L1, Leg kicks - L2, Ankle pumps - L1   Feeding/Nutrition Diet order: Diet/Nutrition Received: NPO, ice chips, sips of water,     Thrombus DVT prophylaxis: VTE Required Core Measure: Pharmacological prophylaxis initiated/maintained   HOB Elevation Head of Bed (HOB) Positioning: HOB at 30-45 degrees   Ulcer Prophylaxis GI: yes   Glucose control managed     Skin Skin assessed during: Daily Assessment    Sacrum intact/not altered? No  Heels intact/not altered? Yes  Surgical wound? Yes    CHECK ONE!   (no altered skin or altered skin) and sub boxes:  [] No Altered Skin Integrity Present    []Prevention Measures Documented    [x] Altered Skin Integrity Present or Discovered   [x] LDA present in EPIC, daily doc completed              [] LDA added if not in EPIC (describe wound).                    When describing wound, do not stage, use descriptive words only.    [] Wound Image Taken (required on admit,                   transfer/discharge and every Tuesday)    Wound Care Consulted? Yes    4 EYES:  Attending Nurse (1st set of eyes): Suraj  BARRY Arreola    Second RN/Staff Member (2nd set of eyes): Summer Liao, PCT   Bowel Function no issues    Indwelling Catheter Necessity [REMOVED]      Urethral Catheter 05/15/24 1600-Reason for Continuing Urinary Catheterization: Critically ill in ICU and requiring hourly monitoring of intake/output       De-escalation Antibiotics Yes        VS and assessment per flow sheet, patient progressing towards goals as tolerated, plan of care reviewed with [unfilled] and family, all concerns addressed at this time.    Suraj Arreola RN

## 2024-05-27 DIAGNOSIS — E53.8 B12 DEFICIENCY: ICD-10-CM

## 2024-05-27 LAB
ALBUMIN SERPL BCP-MCNC: 1.6 G/DL (ref 3.5–5.2)
ALP SERPL-CCNC: 63 U/L (ref 55–135)
ALT SERPL W/O P-5'-P-CCNC: 32 U/L (ref 10–44)
ANION GAP SERPL CALC-SCNC: 9 MMOL/L (ref 8–16)
AST SERPL-CCNC: 40 U/L (ref 10–40)
BASOPHILS # BLD AUTO: 0.25 K/UL (ref 0–0.2)
BASOPHILS NFR BLD: 2.5 % (ref 0–1.9)
BILIRUB SERPL-MCNC: 0.4 MG/DL (ref 0.1–1)
BUN SERPL-MCNC: 4 MG/DL (ref 8–23)
CALCIUM SERPL-MCNC: 7.5 MG/DL (ref 8.7–10.5)
CHLORIDE SERPL-SCNC: 105 MMOL/L (ref 95–110)
CO2 SERPL-SCNC: 21 MMOL/L (ref 23–29)
CREAT SERPL-MCNC: 0.7 MG/DL (ref 0.5–1.4)
DIFFERENTIAL METHOD BLD: ABNORMAL
EOSINOPHIL # BLD AUTO: 0.5 K/UL (ref 0–0.5)
EOSINOPHIL NFR BLD: 4.6 % (ref 0–8)
ERYTHROCYTE [DISTWIDTH] IN BLOOD BY AUTOMATED COUNT: 17.3 % (ref 11.5–14.5)
EST. GFR  (NO RACE VARIABLE): >60 ML/MIN/1.73 M^2
GLUCOSE SERPL-MCNC: 79 MG/DL (ref 70–110)
HCT VFR BLD AUTO: 28 % (ref 37–48.5)
HGB BLD-MCNC: 8.8 G/DL (ref 12–16)
IMM GRANULOCYTES # BLD AUTO: 0.18 K/UL (ref 0–0.04)
IMM GRANULOCYTES NFR BLD AUTO: 1.8 % (ref 0–0.5)
INR PPP: 1 (ref 0.8–1.2)
LYMPHOCYTES # BLD AUTO: 1.5 K/UL (ref 1–4.8)
LYMPHOCYTES NFR BLD: 14.3 % (ref 18–48)
MAGNESIUM SERPL-MCNC: 1.8 MG/DL (ref 1.6–2.6)
MCH RBC QN AUTO: 30.6 PG (ref 27–31)
MCHC RBC AUTO-ENTMCNC: 31.4 G/DL (ref 32–36)
MCV RBC AUTO: 97 FL (ref 82–98)
MONOCYTES # BLD AUTO: 2.1 K/UL (ref 0.3–1)
MONOCYTES NFR BLD: 21 % (ref 4–15)
NEUTROPHILS # BLD AUTO: 5.7 K/UL (ref 1.8–7.7)
NEUTROPHILS NFR BLD: 55.8 % (ref 38–73)
NRBC BLD-RTO: 0 /100 WBC
PHOSPHATE SERPL-MCNC: 3.1 MG/DL (ref 2.7–4.5)
PLATELET # BLD AUTO: 511 K/UL (ref 150–450)
PMV BLD AUTO: 10 FL (ref 9.2–12.9)
POTASSIUM SERPL-SCNC: 3.4 MMOL/L (ref 3.5–5.1)
PROT SERPL-MCNC: 4.9 G/DL (ref 6–8.4)
PROTHROMBIN TIME: 10.9 SEC (ref 9–12.5)
RBC # BLD AUTO: 2.88 M/UL (ref 4–5.4)
SODIUM SERPL-SCNC: 135 MMOL/L (ref 136–145)
WBC # BLD AUTO: 10.18 K/UL (ref 3.9–12.7)

## 2024-05-27 PROCEDURE — 25000003 PHARM REV CODE 250: Mod: NTX

## 2024-05-27 PROCEDURE — 94664 DEMO&/EVAL PT USE INHALER: CPT | Mod: NTX

## 2024-05-27 PROCEDURE — 97116 GAIT TRAINING THERAPY: CPT | Mod: NTX

## 2024-05-27 PROCEDURE — 63600175 PHARM REV CODE 636 W HCPCS: Mod: NTX

## 2024-05-27 PROCEDURE — 99233 SBSQ HOSP IP/OBS HIGH 50: CPT | Mod: GC,NTX,, | Performed by: INTERNAL MEDICINE

## 2024-05-27 PROCEDURE — 99900035 HC TECH TIME PER 15 MIN (STAT): Mod: NTX

## 2024-05-27 PROCEDURE — 84100 ASSAY OF PHOSPHORUS: CPT | Mod: NTX

## 2024-05-27 PROCEDURE — 94761 N-INVAS EAR/PLS OXIMETRY MLT: CPT | Mod: NTX

## 2024-05-27 PROCEDURE — 94640 AIRWAY INHALATION TREATMENT: CPT | Mod: NTX

## 2024-05-27 PROCEDURE — C9113 INJ PANTOPRAZOLE SODIUM, VIA: HCPCS | Mod: NTX | Performed by: INTERNAL MEDICINE

## 2024-05-27 PROCEDURE — 20600001 HC STEP DOWN PRIVATE ROOM: Mod: NTX

## 2024-05-27 PROCEDURE — 85610 PROTHROMBIN TIME: CPT | Mod: NTX

## 2024-05-27 PROCEDURE — 94660 CPAP INITIATION&MGMT: CPT | Mod: NTX

## 2024-05-27 PROCEDURE — 63600175 PHARM REV CODE 636 W HCPCS: Mod: NTX | Performed by: INTERNAL MEDICINE

## 2024-05-27 PROCEDURE — 97530 THERAPEUTIC ACTIVITIES: CPT | Mod: NTX

## 2024-05-27 PROCEDURE — 80053 COMPREHEN METABOLIC PANEL: CPT | Mod: NTX

## 2024-05-27 PROCEDURE — 85025 COMPLETE CBC W/AUTO DIFF WBC: CPT | Mod: NTX

## 2024-05-27 PROCEDURE — 97535 SELF CARE MNGMENT TRAINING: CPT | Mod: NTX

## 2024-05-27 PROCEDURE — 83735 ASSAY OF MAGNESIUM: CPT | Mod: NTX

## 2024-05-27 RX ORDER — MAGNESIUM SULFATE HEPTAHYDRATE 40 MG/ML
2 INJECTION, SOLUTION INTRAVENOUS ONCE
Status: COMPLETED | OUTPATIENT
Start: 2024-05-27 | End: 2024-05-27

## 2024-05-27 RX ORDER — POTASSIUM CHLORIDE 7.45 MG/ML
10 INJECTION INTRAVENOUS
Status: DISPENSED | OUTPATIENT
Start: 2024-05-27 | End: 2024-05-27

## 2024-05-27 RX ORDER — CYANOCOBALAMIN 1000 UG/ML
INJECTION, SOLUTION INTRAMUSCULAR; SUBCUTANEOUS
Qty: 30 ML | Refills: 0 | Status: CANCELLED | OUTPATIENT
Start: 2024-05-27

## 2024-05-27 RX ADMIN — ACETAMINOPHEN 650 MG: 650 SOLUTION ORAL at 08:05

## 2024-05-27 RX ADMIN — QUETIAPINE FUMARATE 50 MG: 25 TABLET ORAL at 08:05

## 2024-05-27 RX ADMIN — POTASSIUM CHLORIDE 10 MEQ: 7.46 INJECTION, SOLUTION INTRAVENOUS at 10:05

## 2024-05-27 RX ADMIN — POTASSIUM CHLORIDE 10 MEQ: 7.46 INJECTION, SOLUTION INTRAVENOUS at 11:05

## 2024-05-27 RX ADMIN — DICLOFENAC SODIUM 2 G: 10 GEL TOPICAL at 08:05

## 2024-05-27 RX ADMIN — COLCHICINE 0.6 MG: 0.6 TABLET, FILM COATED ORAL at 08:05

## 2024-05-27 RX ADMIN — TIOTROPIUM BROMIDE INHALATION SPRAY 2 PUFF: 3.12 SPRAY, METERED RESPIRATORY (INHALATION) at 08:05

## 2024-05-27 RX ADMIN — FLUTICASONE FUROATE AND VILANTEROL TRIFENATATE 1 PUFF: 100; 25 POWDER RESPIRATORY (INHALATION) at 08:05

## 2024-05-27 RX ADMIN — PANTOPRAZOLE SODIUM 40 MG: 40 INJECTION, POWDER, FOR SOLUTION INTRAVENOUS at 08:05

## 2024-05-27 RX ADMIN — ALLOPURINOL 200 MG: 100 TABLET ORAL at 08:05

## 2024-05-27 RX ADMIN — MAGNESIUM SULFATE HEPTAHYDRATE 2 G: 40 INJECTION, SOLUTION INTRAVENOUS at 04:05

## 2024-05-27 RX ADMIN — CYANOCOBALAMIN 1000 MCG: 1000 INJECTION INTRAMUSCULAR; SUBCUTANEOUS at 08:05

## 2024-05-27 RX ADMIN — POTASSIUM CHLORIDE 10 MEQ: 7.46 INJECTION, SOLUTION INTRAVENOUS at 06:05

## 2024-05-27 RX ADMIN — AMLODIPINE BESYLATE 5 MG: 5 TABLET ORAL at 08:05

## 2024-05-27 RX ADMIN — PIPERACILLIN SODIUM AND TAZOBACTAM SODIUM 4.5 G: 4; .5 INJECTION, POWDER, FOR SOLUTION INTRAVENOUS at 10:05

## 2024-05-27 RX ADMIN — POTASSIUM CHLORIDE 10 MEQ: 7.46 INJECTION, SOLUTION INTRAVENOUS at 08:05

## 2024-05-27 RX ADMIN — PIPERACILLIN SODIUM AND TAZOBACTAM SODIUM 4.5 G: 4; .5 INJECTION, POWDER, FOR SOLUTION INTRAVENOUS at 03:05

## 2024-05-27 RX ADMIN — Medication 6 MG: at 08:05

## 2024-05-27 NOTE — NURSING TRANSFER
Nursing Transfer Note      5/27/2024   3:54 PM    Nurse giving handoff: Jocelyn RN  Nurse receiving handoff:Greer     Reason patient is being transferred: transfer orders     Transfer To: 81005    Transfer via bed    Transfer with cardiac monitoring    Transported by PCT and RN     Transfer Vital Signs:  Blood Pressure:117/79  Heart Rate:88  O2:97  Temperature:98.3  Respirations:16    Telemetry: bedside tele   Order for Tele Monitor? Yes    Additional Lines: n/a    4eyes on Skin: no    Medicines sent: yes    Any special needs or follow-up needed: n/a    Patient belongings transferred with patient: Yes    Chart send with patient: Yes    Notified: daughter at bedside.     Patient reassessed at: 5/27/24 @ 1500   1  Upon arrival to floor: cardiac monitor applied, call bell in reach, and bed in lowest position

## 2024-05-27 NOTE — SUBJECTIVE & OBJECTIVE
conducted an initial consultation and Spiritual Assessment for Rueben Osler, who is a 39 y.o.,female. Patient's Primary Language is: [place]. According to the patient's EMR Pentecostal Affiliation is: [place]. The reason the Patient came to the hospital is:   Patient Active Problem List    Diagnosis Date Noted    Obesity, morbid ([facility] [address] 09/17/2019        The  provided the following Interventions:  Initiated a relationship of care and support. Explored issues of oseas, belief, spirituality and Temple/ritual needs while hospitalized. Listened empathically. Provided chaplaincy education. Provided information about Spiritual Care Services. Offered prayer and assurance of continued prayers on patient's behalf. Chart reviewed. The following outcomes where achieved:  Patient shared limited information about both their medical narrative and spiritual journey/beliefs. Patient processed feeling about current hospitalization. Patient expressed gratitude for 's visit. Assessment:  Patient does not have any Temple/cultural needs that will affect patient's preferences in health care. There are no spiritual or Temple issues which require intervention at this time. Plan:  Chaplains will continue to follow and will provide pastoral care on an as needed/requested basis.  recommends bedside caregivers page  on duty if patient shows signs of acute spiritual or emotional distress. Chaplain Kenn Morrison1 [facility]   [phone] Interval History/Significant Events: NAEON. Patient has remained stable and with no complaints. Patient accepted for stepdown to , and she was transferred to room [address]252.    Review of Systems  Objective:     Vital Signs (Most Recent):  Temp: 98.3 °F (36.8 °C) (05/27/24 1501)  Pulse: 91 (05/27/24 1501)  Resp: 19 (05/27/24 1501)  BP: 126/75 (05/27/24 1501)  SpO2: 98 % (05/27/24 1501) Vital Signs (24h Range):  Temp:  [97.9 °F (36.6 °C)-98.3 °F (36.8 °C)] 98.3 °F (36.8 °C)  Pulse:  [74-96] 91  Resp:  [13-59] 19  SpO2:  [92 %-100 %] 98 %  BP: (101-160)/(59-82) 126/75   Weight: 52.6 kg (115 lb 15.4 oz)  Body mass index is 20.54 kg/m².      Intake/Output Summary (Last 24 hours) at 5/27/2024 1533  Last data filed at 5/27/2024 1405  Gross per 24 hour   Intake 588.9 ml   Output 375 ml   Net 213.9 ml          Physical Exam  Vitals and nursing note reviewed.   Constitutional:       General: She is not in acute distress.  HENT:      Nose: No congestion or rhinorrhea.      Mouth/Throat:      Mouth: Mucous membranes are moist.   Eyes:      General:         Right eye: No discharge.         Left eye: No discharge.   Cardiovascular:      Rate and Rhythm: Regular rhythm. Tachycardia present.      Pulses: Normal pulses.      Heart sounds: No murmur heard.  Pulmonary:      Effort: Pulmonary effort is normal. No respiratory distress.      Breath sounds: Normal breath sounds. No wheezing.   Abdominal:      General: Abdomen is flat. There is no distension.      Palpations: Abdomen is soft.      Tenderness: There is no abdominal tenderness.      Comments: Abdominal incision healing well. Clean/dry/no drainage.   Musculoskeletal:      Cervical back: No rigidity.      Right lower leg: No edema.      Left lower leg: No edema.   Neurological:      General: No focal deficit present.      Mental Status: She is alert and oriented to person, place, and time.   Psychiatric:         Mood and Affect: Mood normal.         Behavior: Behavior normal.             Vents:  Oxygen Concentration (%): 0.5 (05/22/24 1315)  Lines/Drains/Airways       Drain  Duration             Female External Urinary Catheter w/ Suction 05/24/24 0900 3 days              Peripheral Intravenous Line  Duration                  Peripheral IV - Single Lumen 05/24/24 1750 20 G;1 3/4 in Anterior;Right Forearm 2 days         Peripheral IV - Single Lumen 05/24/24 1805 20 G;1 3/4 in Anterior;Right Upper Arm 2 days                  Significant Labs:    CBC/Anemia Profile:  Recent Labs   Lab 05/26/24  0835 05/26/24 2015 05/27/24  0901   WBC 11.46 13.46* 10.18   HGB 8.4* 8.6* 8.8*   HCT 26.7* 28.3* 28.0*    510* 511*   MCV 96 98 97   RDW 17.4* 17.3* 17.3*        Chemistries:  Recent Labs   Lab 05/26/24  0323 05/27/24  0301   * 135*   K 3.5 3.4*    105   CO2 22* 21*   BUN 5* 4*   CREATININE 0.7 0.7   CALCIUM 7.5* 7.5*   ALBUMIN 1.6* 1.6*   PROT 4.9* 4.9*   BILITOT 0.5 0.4   ALKPHOS 65 63   ALT 35 32   AST 46* 40   MG 1.5* 1.8   PHOS 3.0 3.1       All pertinent labs within the past 24 hours have been reviewed.    Significant Imaging:  I have reviewed all pertinent imaging results/findings within the past 24 hours.

## 2024-05-27 NOTE — PLAN OF CARE
[facility] has accepted patient in [facility]. [facility] will submit for authorization when patient is medically ready to discharge. Patient medically ready to step down.      Dorothy Hung RN     [phone]

## 2024-05-27 NOTE — PLAN OF CARE
Problem: Physical Therapy  Goal: Physical Therapy Goal  Description: Goals to be met by: 2024    Patient will increase functional independence with mobility by performin. Supine to sit with Minimal Assistance -met   2. Sit to stand transfer with modified Independent using RW  3. Gait  x 50 feet with Contact Guard Assistance using Rolling Walker.   4. Ascend/descend 3 stair with bilateral Handrails Contact Guard Assistance using RW.   5. Pt perform AROM there exer LLE x 15 reps to increase strength for increased mobility.     Outcome: Progressing   Goals remain appropriate. 2024

## 2024-05-27 NOTE — PT/OT/SLP PROGRESS
Physical Therapy  Co-Treatment with OT    Patient Name:  Jessica Floyd   MRN:  [id number]    Recommendations:     Discharge Recommendations: High Intensity Therapy  Discharge Equipment Recommendations: none  Barriers to discharge: None    Assessment:     Jessica Floyd is a 73 y.o. female admitted with a medical diagnosis of Acute lower GI bleeding.  She presents with the following impairments/functional limitations: impaired endurance, weakness, impaired functional mobility, gait instability, impaired balance, decreased safety awareness, decreased lower extremity function pt tolerated treatment better being able to gait train farther. Pt will benefit from cont skilled PT 4x/wk to progress physically.  Pt is significantly below previous functional level, increased risk of falls and increased burden of care currently. Patient has demonstrated sufficient progression to warrant high intensity therapy evidenced by objectives noted below. Pt is s/p exp lap, excision small intestine, hiatal hernia repair 5/17.      Rehab Prognosis: Good; patient would benefit from acute skilled PT services to address these deficits and reach maximum level of function.    Recent Surgery: Procedure(s) (LRB):  LAPAROSCOPY, DIAGNOSTIC (N/A)  LAPAROTOMY, EXPLORATORY (N/A)  EXCISION, SMALL INTESTINE (N/A)  REPAIR, HERNIA, INGUINAL (Bilateral) 10 Days Post-Op    Plan:     During this hospitalization, patient to be seen 4 x/week to address the identified rehab impairments via gait training, therapeutic activities, therapeutic exercises and progress toward the following goals:    Plan of Care Expires:  06/23/24    Subjective     Chief Complaint: pt c/o being SOB with treatment.   Patient/Family Comments/goals: to get better and go home   Pain/Comfort:  Pain Rating 1: 0/10  Pain Rating Post-Intervention 1: 0/10      Objective:     Communicated with nurse  prior to session.  Patient found supine with telemetry, pulse ox (continuous), blood pressure cuff  (hep lock IV) upon PT entry to room.     General Precautions: Standard, fall  Orthopedic Precautions: N/A  Braces: N/A  Respiratory Status: Room air     Functional Mobility:  Bed Mobility:   pt needed increased time to perform tasks due to reports of being SOB.   Rolling Left:  contact guard assistance  Supine to Sit: contact guard assistance HOB elevated for activities.     Transfers:     Sit to Stand:  contact guard assistance with rolling walker    Gait: pt received gait training ~ 6 ft with RW and CGA.     Pt white board updated with current therapists name and level of mobility assistance needed.       AM-PAC 6 CLICK MOBILITY  Turning over in bed (including adjusting bedclothes, sheets and blankets)?: 3  Sitting down on and standing up from a chair with arms (e.g., wheelchair, bedside commode, etc.): 3  Moving from lying on back to sitting on the side of the bed?: 3  Moving to and from a bed to a chair (including a wheelchair)?: 3  Need to walk in hospital room?: 3  Climbing 3-5 steps with a railing?: 1  Basic Mobility Total Score: 16       Treatment & Education:  Pt received verbal instructions in PT POC and BLE there exer. Pt verbally expressed understanding of such.     Patient left up in chair with all lines intact, call button in reach, and RN  notified..    GOALS:   Multidisciplinary Problems       Physical Therapy Goals          Problem: Physical Therapy    Goal Priority Disciplines Outcome Goal Variances Interventions   Physical Therapy Goal     PT, PT/OT Progressing     Description: Goals to be met by: 2024    Patient will increase functional independence with mobility by performin. Supine to sit with Minimal Assistance -met   2. Sit to stand transfer with modified Independent using RW  3. Gait  x 50 feet with Contact Guard Assistance using Rolling Walker.   4. Ascend/descend 3 stair with bilateral Handrails Contact Guard Assistance using RW.   5. Pt perform AROM there exer LLE x 15  reps to increase strength for increased mobility.                          Time Tracking:     PT Received On: 05/27/24  PT Start Time: 0905     PT Stop Time: 0928  PT Total Time (min): 23 min     Billable Minutes: Gait Training 23 min     Treatment Type: Re-evaluation, Treatment  PT/PTA: PT     Number of PTA visits since last PT visit: 0     05/27/2024

## 2024-05-27 NOTE — ASSESSMENT & PLAN NOTE
Leukocytosis on admission, initially thought to be acute phase reactant to GIB as patient afebrile. S/p 5 days IV Zosyn to cover for intraabdominal infection. Leukocytosis jumped from 13 to 25 after new ileus noted on abdominal imaging.     - Discontinued Zosyn; patient received 4 days of abx following new leukocytosis following ileus.  - Patient afebrile and all cultures NGTD  - Leukocytosis down trending

## 2024-05-27 NOTE — PROGRESS NOTES
[facility] - Cardiac Medical ICU  General Surgery  Progress Note    Subjective:     Post-Op Info:  Procedure(s) (LRB):  LAPAROSCOPY, DIAGNOSTIC (N/A)  LAPAROTOMY, EXPLORATORY (N/A)  EXCISION, SMALL INTESTINE (N/A)  REPAIR, HERNIA, INGUINAL (Bilateral)   10 Days Post-Op     Interval History: No acute events. Tolerated clear liquids without issues. Having normal bowel movements, formed with no blood.     Medications:  Continuous Infusions:  Scheduled Meds:   allopurinoL  200 mg Oral Daily    amLODIPine  5 mg Oral Daily    colchicine  0.6 mg Oral Daily    cyanocobalamin  1,000 mcg Intramuscular Weekly    diclofenac sodium  2 g Topical (Top) BID    fluticasone furoate-vilanteroL  1 puff Inhalation Daily    pantoprazole  40 mg Intravenous BID    piperacillin-tazobactam (Zosyn) IV (PEDS and ADULTS) (extended infusion is not appropriate)  4.5 g Intravenous Q8H    potassium chloride  10 mEq Intravenous Q1H    QUEtiapine  50 mg Oral QHS    tiotropium bromide  2 puff Inhalation Daily     PRN Meds:  Current Facility-Administered Medications:     sodium chloride 0.9%, , Intravenous, PRN    acetaminophen, 650 mg, Oral, Q6H PRN    albuterol-ipratropium, 3 mL, Nebulization, Q4H PRN    aluminum & magnesium hydroxide-simethicone, 30 mL, Oral, Q6H PRN    LIDOcaine, , Topical (Top), BID PRN    melatonin, 6 mg, Oral, Nightly PRN    ondansetron, 4 mg, Intravenous, Q4H PRN    sodium chloride, 1 spray, Each Nostril, PRN    sodium chloride 0.9%, 10 mL, Intravenous, PRN     Review of patient's allergies indicates:  No Known Allergies  Objective:     Vital Signs (Most Recent):  Temp: 97.9 °F (36.6 °C) (05/27/24 0300)  Pulse: 86 (05/27/24 0834)  Resp: 18 (05/27/24 0834)  BP: (!) 150/74 (05/27/24 0805)  SpO2: (!) 94 % (05/27/24 0834) Vital Signs (24h Range):  Temp:  [97.8 °F (36.6 °C)-98.1 °F (36.7 °C)] 97.9 °F (36.6 °C)  Pulse:  [] 86  Resp:  [13-59] 18  SpO2:  [92 %-100 %] 94 %  BP: (101-160)/(59-96) 150/74     Weight: 52.6 kg (115 lb  15.4 oz)  Body mass index is 20.54 kg/m².    Intake/Output - Last 3 Shifts         05/25 0700 05/26 0659 05/26 0700 05/27 0659 05/27 0700 05/28 0659    P.O.  250     I.V. (mL/kg) 1995.7 (37.9) 49.8 (0.9) 0 (0)    IV Piggyback 896.5 669.9 0    Total Intake(mL/kg) 2892.2 (55) 969.7 (18.4) 0 (0)    Urine (mL/kg/hr) 600 (0.5) 375 (0.3)     Drains       Other 0      Total Output 600 375     Net +2292.2 +594.7 0           Urine Occurrence 3 x 5 x     Stool Occurrence  2 x              Physical Exam  Vitals reviewed.   HENT:      Head: Normocephalic and atraumatic.   Eyes:      Extraocular Movements: Extraocular movements intact.      Conjunctiva/sclera: Conjunctivae normal.   Cardiovascular:      Rate and Rhythm: Normal rate.   Pulmonary:      Effort: Pulmonary effort is normal. No respiratory distress.   Abdominal:      General: There is distension (minimal).      Palpations: Abdomen is soft.      Tenderness: There is no abdominal tenderness.      Comments: Lower abdominal incision with staples in place, no drainage. Healing well.   Musculoskeletal:         General: No swelling. Normal range of motion.      Cervical back: Normal range of motion.   Skin:     General: Skin is warm and dry.   Neurological:      Mental Status: She is alert.          Significant Labs:  I have reviewed all pertinent lab results within the past 24 hours.  CBC:   Recent Labs   Lab 05/26/24 2015   WBC 13.46*   RBC 2.90*   HGB 8.6*   HCT 28.3*   *   MCV 98   MCH 29.7   MCHC 30.4*     CMP:   Recent Labs   Lab 05/27/24  0301   GLU 79   CALCIUM 7.5*   ALBUMIN 1.6*   PROT 4.9*   *   K 3.4*   CO2 21*      BUN 4*   CREATININE 0.7   ALKPHOS 63   ALT 32   AST 40   BILITOT 0.4       Significant Diagnostics:  I have reviewed all pertinent imaging results/findings within the past 24 hours.  Assessment/Plan:     * Acute lower GI bleeding  Jessica Floyd is a 73 y.o. female who presented with a GIB s/p IR embolization of a tertiary branch  of the ileal artery now with severe LLQ pain. She is now s/p diagnostic laparotomy converted to ex lap with small bowel resection and primary tissue repair of bilateral inguinal hernias 5/17. Previously with ROBF and tolerating diet, now with emesis and dilated bowel concerning for ileus s/p NGT decompression.     - Ok to advance to full liquid diet   - Hgb stable, can trend daily  - Aspiration precautions  - Replete lytes PRN   - K>4, Mag>2, Phos>3  - Abx per primary  - Okay for PT/OT. Would recommend early mobilization as tolerated  - Remainder of care per primary team  - Please contact general surgery with any questions, concerns, or clinical status changes  - Will plan to remove juana prior to discharge from hospital.          Niyah Figueroa MD  General Surgery  [facility] - Cardiac Medical ICU

## 2024-05-27 NOTE — ASSESSMENT & PLAN NOTE
Jessica Floyd is a 73 y.o. female who presented with a GIB s/p IR embolization of a tertiary branch of the ileal artery now with severe LLQ pain. She is now s/p diagnostic laparotomy converted to ex lap with small bowel resection and primary tissue repair of bilateral inguinal hernias 5/17. Previously with ROBF and tolerating diet, now with emesis and dilated bowel concerning for ileus s/p NGT decompression.     - Ok to advance to full liquid diet   - Hgb stable, can trend daily  - Aspiration precautions  - Replete lytes PRN   - K>4, Mag>2, Phos>3  - Abx per primary  - Okay for PT/OT. Would recommend early mobilization as tolerated  - Remainder of care per primary team  - Please contact general surgery with any questions, concerns, or clinical status changes  - Will plan to remove juana prior to discharge from hospital.

## 2024-05-27 NOTE — PROGRESS NOTES
"[facility] - Step[facility] ([place]  Critical Care Medicine  Progress Note    Patient Name: Jessica Floyd  MRN: [id number]  Admission Date: 5/14/2024  Hospital Length of Stay: 13 days  Code Status: Full Code  Attending Provider: Danis Davis MD  Primary Care Provider: Effie Olmedo MD   Principal Problem: Acute lower GI bleeding    Subjective:     HPI:  Mrs. Floyd is a 73 year old female with PMH notable for COPD home O2 PRN, pulm HTN, R carotid stent on ASA, HFpEF, anemia, and ERIK on infusions who presented to [facility] ED with the melena.  is at bedside and reports that the patient had an episode of diarrhea and lethargy yesterday. She reports that it felt as if "she was going to pass out", so she was using her walker to get around. Patient's  reports that this has never happened before. Patient denies excessive OTC NSAID use. She reports that she drinks 2 wine glasses a day. This morning when the patient woke up she felt very lethargic. Patient went to use the bathroom, and  noted bright red stool in the bowl. Patient reported a pre syncopal episode and felt lightheaded. She admits to weakness, SOB, light-headedness, hematochezia, and pre syncope. Patient denies nausea, vomiting, hematemesis, falls, confusion, chest pain, urinary changes, and fevers. Patient reports taking aspirin daily but no DOAC.     Hemoglobin 5.9 at presentation to ED (baseline ~10). GI and IR consulted given concern for active bleed seen on CTA. In the emergency department she was given 1L IVF and a 80 mg IV protonix bolus. Critical care medicine. consulted for GIB. CTA in the Ed revealed "Acute gastrointestinal bleed at the level of the cecum". IR planning for embolization today. Patient is also receiving 2 pRBC due to acute anemia.     Hospital/ICU Course:  Admitted to ICU for lower GIB s/p embolization with IR 5/14. Colonoscopy performed 5/16 without evidence of active bleed, old blood/clots evacuated. Patient is s/p 8U " pRBCs, 1 FFP, 1 platelet. Course complicated by worsening abdominal pain, general surgery consulted for further evaluation. Patient underwent ex lap 5/17 and found to have necrotic bowel at site of incarcerated inguinal hernia. On zosyn for intraabdominal coverage, BC NGTD. Patient oversedated on 5/18 and required BIPAP briefly for CO2 retention. Subsequently placed on precedex for agitation and IV tylenol for pain control. Precedex discontinued and tolerating QHS seroquel. Anemia stable, advanced to regular diet, on 1L NC. Patient reporting left lower extremity pain and weakness during this admission. Lower extremity xrays and ultrasounds negative for acute pathology. Most likely related to PAD however, outpatient neuro consultation placed for EMG to rule out alternative etiology.     Patient was stepped down to medicine 5/22 after reporting 2 normal non-bloody bowel movements with negative abdominal exam. On 5/23 the patient reported worsening abdominal pain and distension. She experienced a dark tarry stool over night, as well has worsening reflux. Daughter notes that patient spitting up large volume dark emesis. KUB was obtained concerning for ileus vs partial ABO, at which point surgery was notified. NG tube was inserted and placed to low intermittent suction, with ~3 liters removed since insertion. She also had an episode of dark stool. She stepped back up to MICU. Hgb stable, no further episodes of dark stools. Abdominal exam with significant improvement after NG tube drainage. NG tube removed 5/25, advancing diet slowly per surg. Stable for stepdown.    Interval History/Significant Events: NAEON. Patient has remained stable and with no complaints. Patient accepted for stepdown to , and she was transferred to room 87099.    Review of Systems  Objective:     Vital Signs (Most Recent):  Temp: 98.3 °F (36.8 °C) (05/27/24 1501)  Pulse: 91 (05/27/24 1501)  Resp: 19 (05/27/24 1501)  BP: 126/75 (05/27/24  1501)  SpO2: 98 % (05/27/24 1501) Vital Signs (24h Range):  Temp:  [97.9 °F (36.6 °C)-98.3 °F (36.8 °C)] 98.3 °F (36.8 °C)  Pulse:  [74-96] 91  Resp:  [13-59] 19  SpO2:  [92 %-100 %] 98 %  BP: (101-160)/(59-82) 126/75   Weight: 52.6 kg (115 lb 15.4 oz)  Body mass index is 20.54 kg/m².      Intake/Output Summary (Last 24 hours) at 5/27/2024 1533  Last data filed at 5/27/2024 1405  Gross per 24 hour   Intake 588.9 ml   Output 375 ml   Net 213.9 ml          Physical Exam  Vitals and nursing note reviewed.   Constitutional:       General: She is not in acute distress.  HENT:      Nose: No congestion or rhinorrhea.      Mouth/Throat:      Mouth: Mucous membranes are moist.   Eyes:      General:         Right eye: No discharge.         Left eye: No discharge.   Cardiovascular:      Rate and Rhythm: Regular rhythm. Tachycardia present.      Pulses: Normal pulses.      Heart sounds: No murmur heard.  Pulmonary:      Effort: Pulmonary effort is normal. No respiratory distress.      Breath sounds: Normal breath sounds. No wheezing.   Abdominal:      General: Abdomen is flat. There is no distension.      Palpations: Abdomen is soft.      Tenderness: There is no abdominal tenderness.      Comments: Abdominal incision healing well. Clean/dry/no drainage.   Musculoskeletal:      Cervical back: No rigidity.      Right lower leg: No edema.      Left lower leg: No edema.   Neurological:      General: No focal deficit present.      Mental Status: She is alert and oriented to person, place, and time.   Psychiatric:         Mood and Affect: Mood normal.         Behavior: Behavior normal.            Vents:  Oxygen Concentration (%): 0.5 (05/22/24 1315)  Lines/Drains/Airways       Drain  Duration             Female External Urinary Catheter w/ Suction 05/24/24 0900 3 days              Peripheral Intravenous Line  Duration                  Peripheral IV - Single Lumen 05/24/24 1750 20 G;1 3/4 in Anterior;Right Forearm 2 days          "Peripheral IV - Single Lumen 05/24/24 1805 20 G;1 3/4 in Anterior;Right Upper Arm 2 days                  Significant Labs:    CBC/Anemia Profile:  Recent Labs   Lab 05/26/24  0835 05/26/24 2015 05/27/24  0901   WBC 11.46 13.46* 10.18   HGB 8.4* 8.6* 8.8*   HCT 26.7* 28.3* 28.0*    510* 511*   MCV 96 98 97   RDW 17.4* 17.3* 17.3*        Chemistries:  Recent Labs   Lab 05/26/24  0323 05/27/24  0301   * 135*   K 3.5 3.4*    105   CO2 22* 21*   BUN 5* 4*   CREATININE 0.7 0.7   CALCIUM 7.5* 7.5*   ALBUMIN 1.6* 1.6*   PROT 4.9* 4.9*   BILITOT 0.5 0.4   ALKPHOS 65 63   ALT 35 32   AST 46* 40   MG 1.5* 1.8   PHOS 3.0 3.1       All pertinent labs within the past 24 hours have been reviewed.    Significant Imaging:  I have reviewed all pertinent imaging results/findings within the past 24 hours.    ABG  No results for input(s): "PH", "PO2", "PCO2", "HCO3", "BE" in the last 168 hours.  Assessment/Plan:     Pulmonary  Pulmonary emphysema  Not on oxygen at home. Restart home inhalers.    Cardiac/Vascular  (HFpEF) heart failure with preserved ejection fraction  Noted in history but last echo reveals normal function. Holding home coreg.    Transthoracic echo (TTE) complete 10/29/2023    Interpretation Summary    Left Ventricle: The left ventricle is normal in size. Normal wall thickness. Normal wall motion. There is normal systolic function with a visually estimated ejection fraction of 60 - 65%. There is normal diastolic function.    Right Ventricle: Mild right ventricular enlargement. Wall thickness is normal. Right ventricle wall motion  is normal. Systolic function is normal.    Aortic Valve: The aortic valve is a unicuspid valve. There is mild aortic valve sclerosis. There is trace aortic regurgitation.    Mitral Valve: There is mild regurgitation.    Pulmonary Artery: There is mild pulmonary hypertension. The estimated pulmonary artery systolic pressure is 40 mmHg.    IVC/SVC: Intermediate venous " pressure at 8 mmHg.        Hypertension  Hypertension Medications                    amLODIPine (NORVASC) 5 MG tablet TAKE 1 TABLET BY MOUTH EVERY DAY     carvediloL (COREG) 6.25 MG tablet Take 1 tablet (6.25 mg total) by mouth 2 (two) times daily with meals.     hydroCHLOROthiazide (HYDRODIURIL) 25 MG tablet Take 1 tablet (25 mg total) by mouth once daily.       -Will restart home meds as tolerated    Bilateral carotid artery stenosis  April 2022     No evidence of hemodynamically significant stenosis is demonstrated in the extracranial carotid arteries.  Patent right carotid stent    Renal/  Hypernatremia  In the setting of decreased PO intake for multiple days, 1.5L deficit. Received gentle IVF, now advanced to CLD. Will prioritize PO intake. Currently improving.    Oncology  Acute blood loss anemia  See acute lower GIB    Leukocytosis  Leukocytosis on admission, initially thought to be acute phase reactant to GIB as patient afebrile. S/p 5 days IV Zosyn to cover for intraabdominal infection. Leukocytosis jumped from 13 to 25 after new ileus noted on abdominal imaging.     - Discontinued Zosyn; patient received 4 days of abx following new leukocytosis following ileus.  - Patient afebrile and all cultures NGTD  - Leukocytosis down trending    GI  * Acute lower GI bleeding  73 year old female with multiple medical problems presenting with melena and some bright red blood with associated pre-syncope found to have hgb of 5.9 (from baseline ~10). CTA with acute gastrointestinal bleed at the level of the cecum. Patient had embolization by IR (5/14) but continued to to have larger volume dark red blood in stool with falling hgb, repeat CTA without active bleed. S/p 9U pRBCs, 1 FFP, 1 platelet. Colonoscopy performed 5/16 with no evidence of active bleed, old blood and clots evacuated. Tolerated advanced diet and stepped down on 5/22. Patient stepped back up to MICU 5/23 after ileus noted on KUB, NG tube placed, concern  for GIB with dark stools however Hgb remained stable. Dark NG tube output, likely bilious as opposed to blood. NG tube removed 5/25.    -- diet NPO except ice chips and sips with meds  -- Hgb stable, if down-trends will reach out to GI  -- Protonix 40 mg IV BID  -- Hold all A/C and A/P agents, on only DVT prophylaxis  -- Correct any coagulopathy with platelets and FFP for goal of platelets > 50K and INR <2.0   -- Maintain large bore IV access   -- MAP > 65 goal   -- Maintain type and screen   -- Trend CBCs    Incarcerated inguinal hernia  See bowel ischemia    Ischemia, bowel  S/p ex lap 5/17 with incarcerated hernia repair with bowel resection per general surgery    Ileus  Noted on CT A/P. Previously thought to have resolved. KUB concerning for continued ileus vs pSBO. S/p NGT with significant output.    -NGT removed  -Advanced to CLD and tolerating well    Orthopedic  Left leg pain  Patient reporting left left pain and weakness associated with pain. Patient has known severe atherosclerosis which may be associated with pain/PAD. Patient has remote history of left hip fracture. Lower extremity ultrasounds and L spine, pelvis, left hip, left femur, left knee, left tib/fib xrays unrevealing.    - PT/OT  - PT recommending rehab, PM&R consulted  - Lidocaine lotion PRN  - Neurology consultation at discharge for EMG to rule out nerve damage    Gout  Patient unable to take gout prophylactic home meds while NPO. Experiencing gout flare 5/25, left foot. NSAIDs contraindicated with GIB. Avoiding opioids in setting of ileus. No IV uric acid reducing medications on formulary.     - Per surg, okay with PO meds  - Colchicine 1.2mg, followed by 0.6mg 1 hour later for gout flare  - Restart home prophylactic medications  - Voltaren gel   - Ice PRN  - Would like to avoid steroids if possible    Other  History Situational (ie Stress Induced) syncope ([facility] admission 12-25-23  History of syncope. Thought to be reflex mediated. Had  pre-syncopal episode at home after having diarrhea but before she noticed active bleeding.     Acute hypoxic on chronic hypercapnic respiratory failure  Patient with Hypercapnic and Hypoxic Respiratory failure which is Acute.  she is on home oxygen at 2 LPM. Signs/symptoms of respiratory failure include- respiratory distress. Contributing diagnoses includes - COPD and oversedation.  Labs and images were reviewed. Patient Has recent ABG, which has been reviewed. Will treat underlying causes and adjust management of respiratory failure as follows: Patient required intermittent short term BIPAP, now satting well on low flow O2 NC.    - Home inhalers  - IS    Impaired mobility  See left leg pain       Critical Care Daily Checklist:    A: Awake: RASS Goal/Actual Goal: RASS Goal: 0-->alert and calm  Actual:     B: Spontaneous Breathing Trial Performed?     C: SAT & SBT Coordinated?  N/A                      D: Delirium: CAM-ICU Overall CAM-ICU: Negative   E: Early Mobility Performed? Yes   F: Feeding Goal:    Status:     Current Diet Order   Procedures    Diet Clear Liquid Standard Tray     Order Specific Question:   Tray type:     Answer:   Standard Tray      AS: Analgesia/Sedation None   T: Thromboembolic Prophylaxis No   H: HOB > 300 Yes   U: Stress Ulcer Prophylaxis (if needed) PPI   G: Glucose Control Managed   B: Bowel Function Stool Occurrence: 1   I: Indwelling Catheter (Lines & Maldonado) Necessity Purewic, PIV x2   D: De-escalation of Antimicrobials/Pharmacotherapies Discontinued zosyn    Plan for the day/ETD Stepdown to     Code Status:  Family/Goals of Care: Full Code           Critical care was time spent personally by me on the following activities: development of treatment plan with patient or surrogate and bedside caregivers, discussions with consultants, evaluation of patient's response to treatment, examination of patient, ordering and performing treatments and interventions, ordering and review of  laboratory studies, ordering and review of radiographic studies, pulse oximetry, re-evaluation of patient's condition. This critical care time did not overlap with that of any other provider or involve time for any procedures.     Marcelo Stone,   Critical Care Medicine  [facility] ([place])

## 2024-05-27 NOTE — PLAN OF CARE
Problem: Adult Inpatient Plan of Care  Goal: Plan of Care Review  Outcome: Progressing  Flowsheets (Taken 5/27/2024 1655)  Plan of Care Reviewed With:   patient   family  Goal: Patient-Specific Goal (Individualized)  Outcome: Progressing  Goal: Absence of Hospital-Acquired Illness or Injury  Outcome: Progressing  Intervention: Identify and Manage Fall Risk  Flowsheets (Taken 5/27/2024 1655)  Safety Promotion/Fall Prevention:   assistive device/personal item within reach   side rails raised x 2   instructed to call staff for mobility  Intervention: Prevent Skin Injury  Flowsheets (Taken 5/27/2024 1655)  Body Position: position maintained  Skin Protection: incontinence pads utilized  Device Skin Pressure Protection:   absorbent pad utilized/changed   tubing/devices free from skin contact  Intervention: Prevent and Manage VTE (Venous Thromboembolism) Risk  Flowsheets (Taken 5/27/2024 1655)  VTE Prevention/Management: bleeding precations maintained  Intervention: Prevent Infection  Flowsheets (Taken 5/27/2024 1655)  Infection Prevention: single patient room provided    Earl

## 2024-05-27 NOTE — PT/OT/SLP PROGRESS
"Occupational Therapy  Co Treatment    Name: Jessica Floyd  MRN: [id number]  Admitting Diagnosis:  Acute lower GI bleeding  10 Days Post-Op    Recommendations:     Discharge Recommendations: High Intensity Therapy      Assessment:     Jessica Floyd is a 73 y.o. female with a medical diagnosis of Acute lower GI bleeding. Performance deficits affecting function are weakness, impaired endurance, impaired self care skills, impaired functional mobility, gait instability, impaired balance, decreased upper extremity function, decreased lower extremity function.   Pt tolerated session well. Increased time needed for all tasks due to increased anxiety with activity   Rehab Prognosis:  Good; patient would benefit from acute skilled OT services to address these deficits and reach maximum level of function.       Plan:     Patient to be seen 4 x/week to address the above listed problems via self-care/home management, therapeutic activities, therapeutic exercises  Plan of Care Expires: 06/19/24  Plan of Care Reviewed with: patient    Subjective     "I can feel myself getting anxious" pt states     Pain/Comfort:  Pain Rating 1: 0/10    Objective:     Communicated with: nsg prior to session.  Patient found in bed with tele, pulse ox, BP cuff, IV.   Cotx completed this date to optimize functional performance and safety   General Precautions: Standard, fall, NPO      Occupational Performance:     Bed mobility:   Supine>sit with CGA  Rolling with CGA     Functional Mobility/Transfers:  Sit>stand with CGA     Activities of Daily Living:  Feeding:independent  G/H seated with set-up for oral care, washing face and combing hair   LE dressing: MAX  A  UE dressing; MAX A     AMPAC 6 Click ADL: 14    Treatment & Education:  Pt initially declining activity this date stating she wanted "a day off". Increased time spent providing education re: impacts of prolonged immobility and importance of OOB daily. Pt agreeable to therapy.   Pt demo SBA for " postural control seated EOB. Pt required CGA for standing and functional mobility with RW to chair.   Education provided re: role of OT and safety with functional mobility/ADL skills.         Patient left up in chair with all lines intact, call button in reach, and nsg notified    GOALS:   Multidisciplinary Problems       Occupational Therapy Goals          Problem: Occupational Therapy    Goal Priority Disciplines Outcome Interventions   Occupational Therapy Goal     OT, PT/OT Progressing    Description: Re-eval completed 05-25 Goals to be met by: 6/8/24     Patient will increase functional independence with ADLs by performing:    UE Dressing with Stand-by Assistance.  LE Dressing with Minimal Assistance.  Grooming while standing at sink with stand by Assistance.  Toileting from toilet with Minimal Assistance for hygiene and clothing management.   Toilet transfer to toilet with stand by assist                         Time Tracking:     OT Date of Treatment: 05/27/24  OT Start Time: 0904  OT Stop Time: 0927  OT Total Time (min): 23 min    Billable Minutes:Self Care/Home Management 12  Therapeutic Activity 11    OT/OCTAVIO: OT          5/27/2024

## 2024-05-27 NOTE — PROGRESS NOTES
Nurses Note -- 4 Eyes      5/27/2024   4:08 PM      Skin assessed during: Transfer      [] No Pressure Injuries Present    []Prevention Measures Documented      [x] Yes- Altered Skin Integrity Present or Discovered   [] LDA Added if Not in Epic (Describe Wound)   [] New Altered Skin Integrity was Present on Admit and Documented in LDA   [] Wound Image Taken    Wound Care Consulted? Yes    Attending Nurse:  Martha Shipley RN     Second RN/Staff Member:    Wound care orders in.

## 2024-05-27 NOTE — SUBJECTIVE & OBJECTIVE
Interval History: No acute events. Tolerated clear liquids without issues. Having normal bowel movements, formed with no blood.     Medications:  Continuous Infusions:  Scheduled Meds:   allopurinoL  200 mg Oral Daily    amLODIPine  5 mg Oral Daily    colchicine  0.6 mg Oral Daily    cyanocobalamin  1,000 mcg Intramuscular Weekly    diclofenac sodium  2 g Topical (Top) BID    fluticasone furoate-vilanteroL  1 puff Inhalation Daily    pantoprazole  40 mg Intravenous BID    piperacillin-tazobactam (Zosyn) IV (PEDS and ADULTS) (extended infusion is not appropriate)  4.5 g Intravenous Q8H    potassium chloride  10 mEq Intravenous Q1H    QUEtiapine  50 mg Oral QHS    tiotropium bromide  2 puff Inhalation Daily     PRN Meds:  Current Facility-Administered Medications:     sodium chloride 0.9%, , Intravenous, PRN    acetaminophen, 650 mg, Oral, Q6H PRN    albuterol-ipratropium, 3 mL, Nebulization, Q4H PRN    aluminum & magnesium hydroxide-simethicone, 30 mL, Oral, Q6H PRN    LIDOcaine, , Topical (Top), BID PRN    melatonin, 6 mg, Oral, Nightly PRN    ondansetron, 4 mg, Intravenous, Q4H PRN    sodium chloride, 1 spray, Each Nostril, PRN    sodium chloride 0.9%, 10 mL, Intravenous, PRN     Review of patient's allergies indicates:  No Known Allergies  Objective:     Vital Signs (Most Recent):  Temp: 97.9 °F (36.6 °C) (05/27/24 0300)  Pulse: 86 (05/27/24 0834)  Resp: 18 (05/27/24 0834)  BP: (!) 150/74 (05/27/24 0805)  SpO2: (!) 94 % (05/27/24 0834) Vital Signs (24h Range):  Temp:  [97.8 °F (36.6 °C)-98.1 °F (36.7 °C)] 97.9 °F (36.6 °C)  Pulse:  [] 86  Resp:  [13-59] 18  SpO2:  [92 %-100 %] 94 %  BP: (101-160)/(59-96) 150/74     Weight: 52.6 kg (115 lb 15.4 oz)  Body mass index is 20.54 kg/m².    Intake/Output - Last 3 Shifts         05/25 0700 05/26 0659 05/26 0700 05/27 0659 05/27 0700 05/28 0659    P.O.  250     I.V. (mL/kg) 1995.7 (37.9) 49.8 (0.9) 0 (0)    IV Piggyback 896.5 669.9 0    Total Intake(mL/kg) 2892.2  (55) 969.7 (18.4) 0 (0)    Urine (mL/kg/hr) 600 (0.5) 375 (0.3)     Drains       Other 0      Total Output 600 375     Net +2292.2 +594.7 0           Urine Occurrence 3 x 5 x     Stool Occurrence  2 x              Physical Exam  Vitals reviewed.   HENT:      Head: Normocephalic and atraumatic.   Eyes:      Extraocular Movements: Extraocular movements intact.      Conjunctiva/sclera: Conjunctivae normal.   Cardiovascular:      Rate and Rhythm: Normal rate.   Pulmonary:      Effort: Pulmonary effort is normal. No respiratory distress.   Abdominal:      General: There is distension (minimal).      Palpations: Abdomen is soft.      Tenderness: There is no abdominal tenderness.      Comments: Lower abdominal incision with staples in place, no drainage. Healing well.   Musculoskeletal:         General: No swelling. Normal range of motion.      Cervical back: Normal range of motion.   Skin:     General: Skin is warm and dry.   Neurological:      Mental Status: She is alert.          Significant Labs:  I have reviewed all pertinent lab results within the past 24 hours.  CBC:   Recent Labs   Lab 05/26/24 2015   WBC 13.46*   RBC 2.90*   HGB 8.6*   HCT 28.3*   *   MCV 98   MCH 29.7   MCHC 30.4*     CMP:   Recent Labs   Lab 05/27/24  0301   GLU 79   CALCIUM 7.5*   ALBUMIN 1.6*   PROT 4.9*   *   K 3.4*   CO2 21*      BUN 4*   CREATININE 0.7   ALKPHOS 63   ALT 32   AST 40   BILITOT 0.4       Significant Diagnostics:  I have reviewed all pertinent imaging results/findings within the past 24 hours.

## 2024-05-27 NOTE — PLAN OF CARE
MICU DAILY GOALS     Family/Goals of care/Code Status   Code Status: Full Code    24H Vital Sign Range  Temp:  [97.8 °F (36.6 °C)-98.2 °F (36.8 °C)]   Pulse:  []   Resp:  [16-59]   BP: (101-160)/(59-96)   SpO2:  [92 %-100 %]      Shift Events (include procedures and significant events)  No acute events throughout shift. Transfer orders in place.    AWAKE RASS: Goal - RASS Goal: 0-->alert and calm  Actual - RASS ([place] Agitation-Sedation Scale): alert and calm    Restraint necessity: Not necessary   BREATHE SBT: Not intubated    Coordinate A & B, analgesics/sedatives Pain: managed   SAT: Not intubated   Delirium CAM-ICU: Overall CAM-ICU: Negative   Early(intubated/ Progressive (non-intubated) Mobility MOVE Screen (INTUBATED ONLY): Not intubated    Activity: Activity Management: Rolling - L1, Arm raise - L1, Leg kicks - L2, Ankle pumps - L1   Feeding/Nutrition Diet order: Diet/Nutrition Received: clear liquid,     Thrombus DVT prophylaxis: VTE Required Core Measure: Pharmacological prophylaxis initiated/maintained   HOB Elevation Head of Bed (HOB) Positioning: HOB at 30-45 degrees   Ulcer Prophylaxis GI: yes   Glucose control managed     Skin Skin assessed during: Q Shift Change    Sacrum intact/not altered? No  Heels intact/not altered? Yes  Surgical wound? Yes    CHECK ONE!   (no altered skin or altered skin) and sub boxes:  [] No Altered Skin Integrity Present    []Prevention Measures Documented    [x] Altered Skin Integrity Present or Discovered   [x] LDA present in EPIC, daily doc completed              [] LDA added if not in EPIC (describe wound).                    When describing wound, do not stage, use descriptive words only.    [] Wound Image Taken (required on admit,                   transfer/discharge and every Tuesday)    Wound Care Consulted? Yes    4 EYES:  Attending Nurse (1st set of eyes): Suraj Arreola RN    Second RN/Staff Member (2nd set of eyes): Jocelyn Wood RN   Bowel Function no  issues    Indwelling Catheter Necessity [REMOVED]      Urethral Catheter 05/15/24 1600-Reason for Continuing Urinary Catheterization: Critically ill in ICU and requiring hourly monitoring of intake/output       De-escalation Antibiotics Yes        VS and assessment per flow sheet, patient progressing towards goals as tolerated, plan of care reviewed with [unfilled] and family, all concerns addressed at this time.    Suraj Arreola RN

## 2024-05-27 NOTE — ASSESSMENT & PLAN NOTE
Noted on CT A/P. Previously thought to have resolved. KUB concerning for continued ileus vs pSBO. S/p NGT with significant output.    -NGT removed  -Advanced to CLD and tolerating well

## 2024-05-28 PROBLEM — K56.7 ILEUS: Status: RESOLVED | Noted: 2024-05-17 | Resolved: 2024-05-28

## 2024-05-28 LAB
ALBUMIN SERPL BCP-MCNC: 1.5 G/DL (ref 3.5–5.2)
ALP SERPL-CCNC: 59 U/L (ref 55–135)
ALT SERPL W/O P-5'-P-CCNC: 27 U/L (ref 10–44)
ANION GAP SERPL CALC-SCNC: 8 MMOL/L (ref 8–16)
AST SERPL-CCNC: 30 U/L (ref 10–40)
BASOPHILS # BLD AUTO: 0.19 K/UL (ref 0–0.2)
BASOPHILS # BLD AUTO: 0.19 K/UL (ref 0–0.2)
BASOPHILS NFR BLD: 2.7 % (ref 0–1.9)
BASOPHILS NFR BLD: 2.7 % (ref 0–1.9)
BILIRUB SERPL-MCNC: 0.3 MG/DL (ref 0.1–1)
BUN SERPL-MCNC: 4 MG/DL (ref 8–23)
CALCIUM SERPL-MCNC: 7.3 MG/DL (ref 8.7–10.5)
CHLORIDE SERPL-SCNC: 108 MMOL/L (ref 95–110)
CO2 SERPL-SCNC: 22 MMOL/L (ref 23–29)
CREAT SERPL-MCNC: 0.7 MG/DL (ref 0.5–1.4)
DIFFERENTIAL METHOD BLD: ABNORMAL
DIFFERENTIAL METHOD BLD: ABNORMAL
EOSINOPHIL # BLD AUTO: 0.4 K/UL (ref 0–0.5)
EOSINOPHIL # BLD AUTO: 0.4 K/UL (ref 0–0.5)
EOSINOPHIL NFR BLD: 5.1 % (ref 0–8)
EOSINOPHIL NFR BLD: 5.2 % (ref 0–8)
ERYTHROCYTE [DISTWIDTH] IN BLOOD BY AUTOMATED COUNT: 17.2 % (ref 11.5–14.5)
ERYTHROCYTE [DISTWIDTH] IN BLOOD BY AUTOMATED COUNT: 17.5 % (ref 11.5–14.5)
EST. GFR  (NO RACE VARIABLE): >60 ML/MIN/1.73 M^2
GLUCOSE SERPL-MCNC: 78 MG/DL (ref 70–110)
HCT VFR BLD AUTO: 25.4 % (ref 37–48.5)
HCT VFR BLD AUTO: 26.3 % (ref 37–48.5)
HGB BLD-MCNC: 8 G/DL (ref 12–16)
HGB BLD-MCNC: 8.1 G/DL (ref 12–16)
IMM GRANULOCYTES # BLD AUTO: 0.11 K/UL (ref 0–0.04)
IMM GRANULOCYTES # BLD AUTO: 0.13 K/UL (ref 0–0.04)
IMM GRANULOCYTES NFR BLD AUTO: 1.6 % (ref 0–0.5)
IMM GRANULOCYTES NFR BLD AUTO: 1.8 % (ref 0–0.5)
INR PPP: 1 (ref 0.8–1.2)
LYMPHOCYTES # BLD AUTO: 1.2 K/UL (ref 1–4.8)
LYMPHOCYTES # BLD AUTO: 1.2 K/UL (ref 1–4.8)
LYMPHOCYTES NFR BLD: 16.3 % (ref 18–48)
LYMPHOCYTES NFR BLD: 16.5 % (ref 18–48)
MAGNESIUM SERPL-MCNC: 1.9 MG/DL (ref 1.6–2.6)
MCH RBC QN AUTO: 29.5 PG (ref 27–31)
MCH RBC QN AUTO: 30.4 PG (ref 27–31)
MCHC RBC AUTO-ENTMCNC: 30.8 G/DL (ref 32–36)
MCHC RBC AUTO-ENTMCNC: 31.5 G/DL (ref 32–36)
MCV RBC AUTO: 96 FL (ref 82–98)
MCV RBC AUTO: 97 FL (ref 82–98)
MONOCYTES # BLD AUTO: 1.5 K/UL (ref 0.3–1)
MONOCYTES # BLD AUTO: 1.6 K/UL (ref 0.3–1)
MONOCYTES NFR BLD: 20.9 % (ref 4–15)
MONOCYTES NFR BLD: 22.2 % (ref 4–15)
NEUTROPHILS # BLD AUTO: 3.7 K/UL (ref 1.8–7.7)
NEUTROPHILS # BLD AUTO: 3.8 K/UL (ref 1.8–7.7)
NEUTROPHILS NFR BLD: 51.9 % (ref 38–73)
NEUTROPHILS NFR BLD: 53.1 % (ref 38–73)
NRBC BLD-RTO: 0 /100 WBC
NRBC BLD-RTO: 0 /100 WBC
PHOSPHATE SERPL-MCNC: 3.1 MG/DL (ref 2.7–4.5)
PLATELET # BLD AUTO: 517 K/UL (ref 150–450)
PLATELET # BLD AUTO: 539 K/UL (ref 150–450)
PMV BLD AUTO: 10 FL (ref 9.2–12.9)
PMV BLD AUTO: 9.9 FL (ref 9.2–12.9)
POTASSIUM SERPL-SCNC: 3.2 MMOL/L (ref 3.5–5.1)
PROT SERPL-MCNC: 4.7 G/DL (ref 6–8.4)
PROTHROMBIN TIME: 11.4 SEC (ref 9–12.5)
RBC # BLD AUTO: 2.63 M/UL (ref 4–5.4)
RBC # BLD AUTO: 2.75 M/UL (ref 4–5.4)
SODIUM SERPL-SCNC: 138 MMOL/L (ref 136–145)
WBC # BLD AUTO: 7.08 K/UL (ref 3.9–12.7)
WBC # BLD AUTO: 7.13 K/UL (ref 3.9–12.7)

## 2024-05-28 PROCEDURE — 99900035 HC TECH TIME PER 15 MIN (STAT): Mod: NTX

## 2024-05-28 PROCEDURE — 94640 AIRWAY INHALATION TREATMENT: CPT | Mod: NTX

## 2024-05-28 PROCEDURE — 94660 CPAP INITIATION&MGMT: CPT | Mod: NTX

## 2024-05-28 PROCEDURE — 84100 ASSAY OF PHOSPHORUS: CPT | Mod: NTX

## 2024-05-28 PROCEDURE — 20600001 HC STEP DOWN PRIVATE ROOM: Mod: NTX

## 2024-05-28 PROCEDURE — 25000003 PHARM REV CODE 250: Mod: NTX

## 2024-05-28 PROCEDURE — 85610 PROTHROMBIN TIME: CPT | Mod: NTX

## 2024-05-28 PROCEDURE — 25000003 PHARM REV CODE 250: Mod: NTX | Performed by: INTERNAL MEDICINE

## 2024-05-28 PROCEDURE — 94761 N-INVAS EAR/PLS OXIMETRY MLT: CPT | Mod: NTX

## 2024-05-28 PROCEDURE — 63600175 PHARM REV CODE 636 W HCPCS: Mod: NTX | Performed by: INTERNAL MEDICINE

## 2024-05-28 PROCEDURE — C9113 INJ PANTOPRAZOLE SODIUM, VIA: HCPCS | Mod: NTX | Performed by: INTERNAL MEDICINE

## 2024-05-28 PROCEDURE — 80053 COMPREHEN METABOLIC PANEL: CPT | Mod: NTX

## 2024-05-28 PROCEDURE — 83735 ASSAY OF MAGNESIUM: CPT | Mod: NTX

## 2024-05-28 PROCEDURE — 36415 COLL VENOUS BLD VENIPUNCTURE: CPT | Mod: NTX

## 2024-05-28 PROCEDURE — 85025 COMPLETE CBC W/AUTO DIFF WBC: CPT | Mod: 91,NTX

## 2024-05-28 PROCEDURE — 94664 DEMO&/EVAL PT USE INHALER: CPT | Mod: NTX

## 2024-05-28 PROCEDURE — 27000646 HC AEROBIKA DEVICE: Mod: NTX

## 2024-05-28 RX ORDER — POTASSIUM CHLORIDE 7.45 MG/ML
10 INJECTION INTRAVENOUS
Status: CANCELLED | OUTPATIENT
Start: 2024-05-28 | End: 2024-05-28

## 2024-05-28 RX ORDER — PANTOPRAZOLE SODIUM 40 MG/1
40 TABLET, DELAYED RELEASE ORAL
Status: DISCONTINUED | OUTPATIENT
Start: 2024-05-28 | End: 2024-06-03 | Stop reason: HOSPADM

## 2024-05-28 RX ORDER — POTASSIUM CHLORIDE 20 MEQ/1
40 TABLET, EXTENDED RELEASE ORAL ONCE
Status: COMPLETED | OUTPATIENT
Start: 2024-05-28 | End: 2024-05-28

## 2024-05-28 RX ADMIN — POTASSIUM CHLORIDE 40 MEQ: 1500 TABLET, EXTENDED RELEASE ORAL at 12:05

## 2024-05-28 RX ADMIN — DICLOFENAC SODIUM 2 G: 10 GEL TOPICAL at 08:05

## 2024-05-28 RX ADMIN — ALLOPURINOL 200 MG: 100 TABLET ORAL at 08:05

## 2024-05-28 RX ADMIN — TIOTROPIUM BROMIDE INHALATION SPRAY 2 PUFF: 3.12 SPRAY, METERED RESPIRATORY (INHALATION) at 11:05

## 2024-05-28 RX ADMIN — PANTOPRAZOLE SODIUM 40 MG: 40 INJECTION, POWDER, FOR SOLUTION INTRAVENOUS at 08:05

## 2024-05-28 RX ADMIN — ACETAMINOPHEN 650 MG: 650 SOLUTION ORAL at 08:05

## 2024-05-28 RX ADMIN — FLUTICASONE FUROATE AND VILANTEROL TRIFENATATE 1 PUFF: 100; 25 POWDER RESPIRATORY (INHALATION) at 11:05

## 2024-05-28 RX ADMIN — LIDOCAINE: 40 CREAM TOPICAL at 05:05

## 2024-05-28 RX ADMIN — ACETAMINOPHEN 650 MG: 650 SOLUTION ORAL at 12:05

## 2024-05-28 RX ADMIN — COLCHICINE 0.6 MG: 0.6 TABLET, FILM COATED ORAL at 08:05

## 2024-05-28 RX ADMIN — AMLODIPINE BESYLATE 5 MG: 5 TABLET ORAL at 08:05

## 2024-05-28 RX ADMIN — PANTOPRAZOLE SODIUM 40 MG: 40 TABLET, DELAYED RELEASE ORAL at 04:05

## 2024-05-28 RX ADMIN — Medication 6 MG: at 08:05

## 2024-05-28 RX ADMIN — QUETIAPINE FUMARATE 50 MG: 25 TABLET ORAL at 08:05

## 2024-05-28 NOTE — ASSESSMENT & PLAN NOTE
Transferred from ICU with a history of GI bleed, seen by GI and General surgery, status post embolization and laparotomy.  Had to be stepped back up to ICU for ileus, GI bleed and hypoxia.  Clinically improved and was step-down to medicine.  Hemoglobin has been stable patient had bowel movements devoid of blood.  We will switch Protonix to p.o. b.i.d..  General surgery okay with advancement to regular diet with removal of staples on 05/31/2024.

## 2024-05-28 NOTE — PROGRESS NOTES
"[facility] - Stepdown Flex ([facility]-14)  Adult Nutrition  Progress Note    SUMMARY       Recommendations    Continue Regular diet as tolerated.  RD to monitor & follow-up.    Goals: Meet % EEN, EPN by RD f/u date  Nutrition Goal Status: new  Communication of RD Recs: reviewed with RN    Assessment and Plan    No nutrition dx at this time.     Reason for Assessment    Reason For Assessment: length of stay  Diagnosis: other (see comments) (GIB)  Relevant Medical History: COPD, HF  Interdisciplinary Rounds: did not attend    General Information Comments: Diet advanced to Regular this AM (was tolerating clear liquids w/ good appetite). Pt appears nourished w/ UBW of 115-120# - no indicators of malnutrition noted. S/p laparoscopy, ex-lap, hernia repair, small intestine excision.   Nutrition Discharge Planning: Adequate PO intake    Nutrition/Diet History    Patient Reported Diet/Restrictions/Preferences: general  Spiritual, Cultural Beliefs, Rastafari Practices, Values that Affect Care: no  Factors Affecting Nutritional Intake: None identified at this time    Anthropometrics    Temp: 98 °F (36.7 °C)  Height Method: Stated  Height: 5' 3" (160 cm)  Height (inches): 63 in  Weight Method: Bed Scale  Weight: 52.6 kg (115 lb 15.4 oz)  Weight (lb): 115.96 lb  Ideal Body Weight (IBW), Female: 115 lb  % Ideal Body Weight, Female (lb): 100.83 %  BMI (Calculated): 20.5  BMI Grade: 18.5-24.9 - normal    Lab/Procedures/Meds    Pertinent Labs Reviewed: reviewed  Pertinent Medications Reviewed: reviewed    Estimated/Assessed Needs    Weight Used For Calorie Calculations: 52.6 kg (115 lb 15.4 oz)    Energy Calorie Requirements (kcal): 1578 kcal/d  Energy Need Method: Kcal/kg (30 kcal/kg)    Protein Requirements: 53-63 g/d (1-1.2 g/kg)  Weight Used For Protein Calculations: 52.6 kg (115 lb 15.4 oz)    Estimated Fluid Requirement Method: other (see comments) (Per MD)  RDA Method (mL): 1578    Nutrition Prescription " Ordered    Current Diet Order: Regular    Evaluation of Received Nutrient/Fluid Intake    I/O: +2.1L since admit    Comments: LBM: 5/28    Tolerance: tolerating    Nutrition Risk    Level of Risk/Frequency of Follow-up:  (1x/week)     Monitor and Evaluation    Food and Nutrient Intake: food and beverage intake, energy intake  Food and Nutrient Adminstration: diet order  Physical Activity and Function: nutrition-related ADLs and IADLs  Anthropometric Measurements: weight, weight change  Biochemical Data, Medical Tests and Procedures: glucose/endocrine profile, lipid profile, inflammatory profile, gastrointestinal profile, electrolyte and renal panel  Nutrition-Focused Physical Findings: overall appearance     Nutrition Follow-Up    RD Follow-up?: Yes

## 2024-05-28 NOTE — PROGRESS NOTES
"[facility] - Stepdown [facility] ([address] Medicine  Progress Note    Patient Name: Jessica Floyd  MRN: [id number]  Patient Class: IP- Inpatient   Admission Date: 5/14/2024  Length of Stay: 14 days  Attending Physician: Danis Davis MD  Primary Care Provider: Effie Olmedo MD        Subjective:     Principal Problem:Acute lower GI bleeding        HPI:  Mrs. Floyd is a 73 year old female with PMH notable for COPD on home 2L NC, pulm HTN, R carotid stent on ASA, HFpEF, anemia, and ERIK on infusions who [place] [facility] [place].  is at bedside and reports that the patient had an episode of diarrhea and lethargy yesterday. She reports that it felt as if "she was going to pass out", so she was using her walker to get around. Patient's  reports that this has never happened before. Patient denies excessive OTC NSAID use. She reports that she drinks 2 wine glasses a day. This morning when the patient woke up she felt very lethargic. Patient went to use the bathroom, and  noted bright red stool in the bowl. Patient reported a pre syncopal episode and felt lightheaded. She admits to weakness, SOB, light-headedness, hematochezia, and pre syncope. Patient denies nausea, vomiting, hematemesis, falls, confusion, chest pain, urinary changes, and fevers. Patient reports taking aspirin daily but no DOAC.      Hemoglobin 5.9 at presentation to ED (baseline ~10). GI and IR consulted given concern for active bleed seen on CTA. In the emergency department she was given 1L IVF and a 80 mg IV protonix bolus. Critical care medicine. consulted for GIB. CTA in the Ed revealed "Acute gastrointestinal bleed at the level of the cecum". IR planning for embolization today. Patient is also receiving 2 pRBC due to acute anemia.     Overview/Hospital Course:  73 year old female with PMH notable for COPD, pulm HTN, R carotid stent on ASA, HFpEF, anemia, and ERIK on infusions who [place] [facility] [place]. " Determined to have an active bleed at the level of the cecum s/p embolization with IR. Post operatively she was noted to have increasing abdominal pain and underwent ex lap with general surgery for incarcerated inguinal hernia on 5/17. She has been stable postoperatively. She was requiring intermittent precedex for severe delirium/agitation but has now been off precedex for 2 days and is stable for stepdown. Hgb stable, tolerating regular diet.      Deemed appropriate for transfer to the floor on 5/22/2024, and was accepted to  team Z for further care and management.  On 5/23/24 patient had significant clinical decline, she had bowel obstruction, respiratory failure and fall in hemoglobin secondary to GI bleed.  She was transferred to ICU NG tube placed and general surgery consulted.  During stay in ICU, patient had improvement, NG tube was removed, no further reports of bleed and hemoglobin stable, she was accepted to [facility] and stepped down to medicine again      Interval History:  Patient had bowel movement at bedside, no blood or melena noted.  Hemoglobin is stable.  Has been tolerating liquid diet well and Gen surg advanced to regular diet.  She has been accepted to [facility].  Reports that she had gout flare to left great toe as she was off of medication, which has now improved since resumption of allopurinol and colchicine     Review of Systems   All other systems reviewed and are negative.     Objective:      Vital Signs (Most Recent):  Temp: 98.3 °F (36.8 °C) (05/28/24 0727)  Pulse: 95 (05/28/24 1122)  Resp: 19 (05/28/24 1122)  BP: 137/70 (05/28/24 0727)  SpO2: 97 % (05/28/24 1122) Vital Signs (24h Range):  Temp:  [97.9 °F (36.6 °C)-99.1 °F (37.3 °C)] 98.3 °F (36.8 °C)  Pulse:  [72-95] 95  Resp:  [13-24] 19  SpO2:  [90 %-99 %] 97 %  BP: (113-145)/(64-75) 137/70      Weight: 52.6 kg (115 lb 15.4 oz)  Body mass index is 20.54 kg/m².     Intake/Output Summary (Last 24 hours) at 5/28/2024  1305  Last data filed at 5/27/2024 1405      Gross per 24 hour   Intake 24.98 ml   Output --   Net 24.98 ml      Physical Exam  Constitutional:       General: She is not in acute distress.  HENT:      Head: Normocephalic.      Right Ear: External ear normal.      Left Ear: External ear normal.      Nose: Nose normal.   Eyes:      General: No scleral icterus.  Cardiovascular:      Rate and Rhythm: Normal rate.   Pulmonary:      Effort: Pulmonary effort is normal.   Abdominal:      Palpations: Abdomen is soft.      Tenderness: There is no abdominal tenderness.      Comments: Hannah noted to abdomen   Musculoskeletal:      Comments: Debility   Skin:     Comments: Swelling and redness noted to left great toe, nontender   Neurological:      General: No focal deficit present.      Mental Status: She is alert and oriented to person, place, and time.            MELD 3.0: 11 at 5/28/2024  2:37 AM  MELD-Na: 6 at 5/28/2024  2:37 AM  Calculated from:  Serum Creatinine: 0.7 mg/dL (Using min of 1 mg/dL) at 5/28/2024  2:37 AM  Serum Sodium: 138 mmol/L (Using max of 137 mmol/L) at 5/28/2024  2:37 AM  Total Bilirubin: 0.3 mg/dL (Using min of 1 mg/dL) at 5/28/2024  2:37 AM  Serum Albumin: 1.5 g/dL at 5/28/2024  2:37 AM  INR(ratio): 1.0 at 5/28/2024  2:37 AM  Age at listing (hypothetical): 73 years  Sex: Female at 5/28/2024  2:37 AM        Significant Labs:  CBC:        Recent Labs   Lab 05/26/24 2015 05/27/24  0901 05/28/24  0237   WBC 13.46* 10.18 7.08  7.13   HGB 8.6* 8.8* 8.0*  8.1*   HCT 28.3* 28.0* 25.4*  26.3*   * 511* 517*  539*      CMP:       Recent Labs   Lab 05/27/24  0301 05/28/24  0237   * 138   K 3.4* 3.2*    108   CO2 21* 22*   GLU 79 78   BUN 4* 4*   CREATININE 0.7 0.7   CALCIUM 7.5* 7.3*   PROT 4.9* 4.7*   ALBUMIN 1.6* 1.5*   BILITOT 0.4 0.3   ALKPHOS 63 59   AST 40 30   ALT 32 27   ANIONGAP 9 8      PTINR:       Recent Labs   Lab 05/27/24  0301 05/28/24  0237   INR 1.0 1.0          Significant Procedures:   Dobutamine Stress Test with Color Flow: No results found. However, due to the size of the patient record, not all encounters were searched. Please check Results Review for a complete set of results.       Assessment/Plan:      * Acute lower GI bleeding  Transferred from ICU with a history of GI bleed, seen by GI and General surgery, status post embolization and laparotomy.  Had to be stepped back up to ICU for ileus, GI bleed and hypoxia.  Clinically improved and was step-down to medicine.  Hemoglobin has been stable patient had bowel movements devoid of blood.  We will switch Protonix to p.o. b.i.d..  General surgery okay with advancement to regular diet with removal of staples on 05/31/2024.    (HFpEF) heart failure with preserved ejection fraction  Echo done in October of 2023 shows no reports of systolic or diastolic heart failure or significant valvulopathy      Pulmonary emphysema  On home oxygen.  Continue with nebs p.r.n. and home inhaler    Impaired mobility  For d/c to [facility]. PT OT     Acute blood loss anemia  See #1    Leukocytosis  Resolved. Completed course of IV Zosyn      Hypertension  BP meds resumed    Hypokalemia  Patient has hypokalemia which is Acute and currently controlled. Most recent potassium levels reviewed-   Lab Results   Component Value Date    K 3.2 (L) 05/28/2024   . Will continue potassium replacement per protocol and recheck repeat levels after replacement completed.       VTE Risk Mitigation (From admission, onward)           Ordered     Place sequential compression device  Until discontinued         05/23/24 0826     IP VTE HIGH RISK PATIENT  Once         05/15/24 0104     Place sequential compression device  Until discontinued         05/15/24 0104                    Discharge Planning   DEBBIE: 5/29/2024     Code Status: Full Code   Is the patient medically ready for discharge?:     Reason for patient still in hospital (select all that apply):  Patient trending condition  Discharge Plan A: Rehab ([facility] accepted patient for rehab.)   Discharge Delays: None known at this time              Danis Davis MD  Department of Hospital Medicine   [facility] - Stepdown Flex ([place]-14)

## 2024-05-28 NOTE — SUBJECTIVE & OBJECTIVE
Interval History: NAEON. HDS. Tolerating clear liquids with nausea, vomiting, abdominal pain. Multiple BM yesterday.     Medications:  Continuous Infusions:  Scheduled Meds:   allopurinoL  200 mg Oral Daily    amLODIPine  5 mg Oral Daily    colchicine  0.6 mg Oral Daily    cyanocobalamin  1,000 mcg Intramuscular Weekly    diclofenac sodium  2 g Topical (Top) BID    fluticasone furoate-vilanteroL  1 puff Inhalation Daily    pantoprazole  40 mg Intravenous BID    QUEtiapine  50 mg Oral QHS    tiotropium bromide  2 puff Inhalation Daily     PRN Meds:  Current Facility-Administered Medications:     sodium chloride 0.9%, , Intravenous, PRN    acetaminophen, 650 mg, Oral, Q6H PRN    albuterol-ipratropium, 3 mL, Nebulization, Q4H PRN    aluminum & magnesium hydroxide-simethicone, 30 mL, Oral, Q6H PRN    LIDOcaine, , Topical (Top), BID PRN    melatonin, 6 mg, Oral, Nightly PRN    ondansetron, 4 mg, Intravenous, Q4H PRN    sodium chloride, 1 spray, Each Nostril, PRN    sodium chloride 0.9%, 10 mL, Intravenous, PRN     Review of patient's allergies indicates:  No Known Allergies  Objective:     Vital Signs (Most Recent):  Temp: 98 °F (36.7 °C) (05/28/24 0348)  Pulse: 75 (05/28/24 0619)  Resp: 13 (05/28/24 0619)  BP: 113/64 (05/28/24 0348)  SpO2: 97 % (05/28/24 0619) Vital Signs (24h Range):  Temp:  [97.9 °F (36.6 °C)-99.1 °F (37.3 °C)] 98 °F (36.7 °C)  Pulse:  [72-93] 75  Resp:  [13-26] 13  SpO2:  [90 %-99 %] 97 %  BP: (113-150)/(64-82) 113/64     Weight: 52.6 kg (115 lb 15.4 oz)  Body mass index is 20.54 kg/m².    Intake/Output - Last 3 Shifts         05/26 0700 05/27 0659 05/27 0700 05/28 0659 05/28 0700 05/29 0659    P.O. 250      I.V. (mL/kg) 49.8 (0.9) 0 (0)     IV Piggyback 669.9 389.1     Total Intake(mL/kg) 969.7 (18.4) 389.1 (7.4)     Urine (mL/kg/hr) 375 (0.3)      Other       Total Output 375      Net +594.7 +389.1            Urine Occurrence 5 x 2 x     Stool Occurrence 2 x 1 x              Physical  Exam  Vitals reviewed.   HENT:      Head: Normocephalic and atraumatic.   Eyes:      Extraocular Movements: Extraocular movements intact.      Conjunctiva/sclera: Conjunctivae normal.   Cardiovascular:      Rate and Rhythm: Normal rate.   Pulmonary:      Effort: Pulmonary effort is normal. No respiratory distress.   Abdominal:      General: There is distension (minimal).      Palpations: Abdomen is soft.      Tenderness: There is no abdominal tenderness.      Comments: Lower abdominal incision with staples in place, no drainage. Healing well.   Musculoskeletal:         General: No swelling. Normal range of motion.      Cervical back: Normal range of motion.   Skin:     General: Skin is warm and dry.   Neurological:      Mental Status: She is alert.      Significant Labs:  I have reviewed all pertinent lab results within the past 24 hours.    Significant Diagnostics:  I have reviewed all pertinent imaging results/findings within the past 24 hours.

## 2024-05-28 NOTE — ASSESSMENT & PLAN NOTE
Patient has hypokalemia which is Acute and currently controlled. Most recent potassium levels reviewed-   Lab Results   Component Value Date    K 3.2 (L) 05/28/2024   . Will continue potassium replacement per protocol and recheck repeat levels after replacement completed.

## 2024-05-28 NOTE — NURSING
Pt AAO x 4, c/o headache x 1. PRN tylenol given. VSS, NSR on monitor. PIVs to right arm intact and flush well. Midline abdominal incision is open to air with staples intact. Abdomen is soft and non tender. Pt's diet was advanced for lunch. Pt tolerated a regular diet with no n/v. Pt turned frequently. Family at the bedside throughout the day. Pt and family updated on POC. No acute events this shift. Bed low and locked, call light in reach.

## 2024-05-28 NOTE — PROGRESS NOTES
[facility] - Stepdown Flex ([facility]-14)  General Surgery  Progress Note    Subjective:     History of Present Illness:  Jessica Floyd is a 73 y.o. female with a history of HTN, HFpEF, CKD3, GERD, and CAD who presented to the ED on 5/14/24 with melena. She is altered on my interview and so the history was collected from chart review and the family. Appears she was feeling light headed and as though she was going to pass out. She also noted hematochezia. Upon arrival she was AF, tachycardic, and hypotensive. CTA was concerning for GIB and so she was taken to IR where she was found to have extravasation from the ileal branch of the SMA. They performed a coil embolization of a tertiary branch. Following this, she was reportedly doing well from a mentation standpoint, but has continued to require blood transfusion. She had an EGD on 5/15 and C-scope on 5/16 neither of which revealed a source for her bleed although GI was not able to reach the cecum due to tortuosity. Over the last couple of days, she has had worsening abdominal pain and she is not able to participate with my interview due to AMS. Her family feels this is pain related although she has been received narcotics and ativan PRN.      She had a low grade fever to 100.6 earlier today. Currently tachycardic to the 100s but HDS. Her CBC is notable for WBC 40 (stable from prior) and H/H 8.6/25.5. her CMP shows a 2 bili of 2.2 but is otherwise unremarkable. Lactic acid is normal at 1.3. She had a repeat CT A/P today which showed dilated bowel consistent with an ileus. However, per her family and the primary team, her pain seems out of proportion to the findings.     Post-Op Info:  Procedure(s) (LRB):  LAPAROSCOPY, DIAGNOSTIC (N/A)  LAPAROTOMY, EXPLORATORY (N/A)  EXCISION, SMALL INTESTINE (N/A)  REPAIR, HERNIA, INGUINAL (Bilateral)   11 Days Post-Op     Interval History: NAEON. HDS. Tolerating clear liquids with nausea, vomiting, abdominal pain. Multiple BM  yesterday.     Medications:  Continuous Infusions:  Scheduled Meds:   allopurinoL  200 mg Oral Daily    amLODIPine  5 mg Oral Daily    colchicine  0.6 mg Oral Daily    cyanocobalamin  1,000 mcg Intramuscular Weekly    diclofenac sodium  2 g Topical (Top) BID    fluticasone furoate-vilanteroL  1 puff Inhalation Daily    pantoprazole  40 mg Intravenous BID    QUEtiapine  50 mg Oral QHS    tiotropium bromide  2 puff Inhalation Daily     PRN Meds:  Current Facility-Administered Medications:     sodium chloride 0.9%, , Intravenous, PRN    acetaminophen, 650 mg, Oral, Q6H PRN    albuterol-ipratropium, 3 mL, Nebulization, Q4H PRN    aluminum & magnesium hydroxide-simethicone, 30 mL, Oral, Q6H PRN    LIDOcaine, , Topical (Top), BID PRN    melatonin, 6 mg, Oral, Nightly PRN    ondansetron, 4 mg, Intravenous, Q4H PRN    sodium chloride, 1 spray, Each Nostril, PRN    sodium chloride 0.9%, 10 mL, Intravenous, PRN     Review of patient's allergies indicates:  No Known Allergies  Objective:     Vital Signs (Most Recent):  Temp: 98 °F (36.7 °C) (05/28/24 0348)  Pulse: 75 (05/28/24 0619)  Resp: 13 (05/28/24 0619)  BP: 113/64 (05/28/24 0348)  SpO2: 97 % (05/28/24 0619) Vital Signs (24h Range):  Temp:  [97.9 °F (36.6 °C)-99.1 °F (37.3 °C)] 98 °F (36.7 °C)  Pulse:  [72-93] 75  Resp:  [13-26] 13  SpO2:  [90 %-99 %] 97 %  BP: (113-150)/(64-82) 113/64     Weight: 52.6 kg (115 lb 15.4 oz)  Body mass index is 20.54 kg/m².    Intake/Output - Last 3 Shifts         05/26 0700 05/27 0659 05/27 0700 05/28 0659 05/28 0700 05/29 0659    P.O. 250      I.V. (mL/kg) 49.8 (0.9) 0 (0)     IV Piggyback 669.9 389.1     Total Intake(mL/kg) 969.7 (18.4) 389.1 (7.4)     Urine (mL/kg/hr) 375 (0.3)      Other       Total Output 375      Net +594.7 +389.1            Urine Occurrence 5 x 2 x     Stool Occurrence 2 x 1 x              Physical Exam  Vitals reviewed.   HENT:      Head: Normocephalic and atraumatic.   Eyes:      Extraocular Movements:  Extraocular movements intact.      Conjunctiva/sclera: Conjunctivae normal.   Cardiovascular:      Rate and Rhythm: Normal rate.   Pulmonary:      Effort: Pulmonary effort is normal. No respiratory distress.   Abdominal:      General: There is distension (minimal).      Palpations: Abdomen is soft.      Tenderness: There is no abdominal tenderness.      Comments: Lower abdominal incision with staples in place, no drainage. Healing well.   Musculoskeletal:         General: No swelling. Normal range of motion.      Cervical back: Normal range of motion.   Skin:     General: Skin is warm and dry.   Neurological:      Mental Status: She is alert.      Significant Labs:  I have reviewed all pertinent lab results within the past 24 hours.    Significant Diagnostics:  I have reviewed all pertinent imaging results/findings within the past 24 hours.  Assessment/Plan:     * Acute lower GI bleeding  Jessica Floyd is a 73 y.o. female who presented with a GIB s/p IR embolization of a tertiary branch of the ileal artery now with severe LLQ pain. She is now s/p diagnostic laparotomy converted to ex lap with small bowel resection and primary tissue repair of bilateral inguinal hernias 5/17. Previously with ROBF and tolerating diet, now with emesis and dilated bowel concerning for ileus s/p NGT decompression.     - Ok to advance to full liquid diet today   - Hgb stable, can trend daily  - Aspiration precautions  - Replete lytes PRN   - K>4, Mag>2, Phos>3  - Abx per primary  - Okay for PT/OT. Would recommend early mobilization as tolerated  - Remainder of care per primary team  - Please contact general surgery with any questions, concerns, or clinical status changes  - Will plan to remove juana prior to discharge from hospital.          Arlene Lee MD  General Surgery  [facility] - Stepdown Flex ([place]-14)

## 2024-05-28 NOTE — PLAN OF CARE
Recommendations     Continue Regular diet as tolerated.  RD to monitor & follow-up.     Goals: Meet % EEN, EPN by RD f/u date  Nutrition Goal Status: new  Communication of RD Recs: reviewed with RN

## 2024-05-28 NOTE — SUBJECTIVE & OBJECTIVE
Interval History:  Patient had bowel movement at bedside, no blood or melena noted.  Hemoglobin is stable.  Has been tolerating liquid diet well and Gen surg advanced to regular diet.  She has been accepted to [facility] skilled nursing facility.  Reports that she had gout flare to left great toe as she was off of medication, which has now improved since resumption of allopurinol and colchicine    Review of Systems   All other systems reviewed and are negative.    Objective:     Vital Signs (Most Recent):  Temp: 98.3 °F (36.8 °C) (05/28/24 0727)  Pulse: 95 (05/28/24 1122)  Resp: 19 (05/28/24 1122)  BP: 137/70 (05/28/24 0727)  SpO2: 97 % (05/28/24 1122) Vital Signs (24h Range):  Temp:  [97.9 °F (36.6 °C)-99.1 °F (37.3 °C)] 98.3 °F (36.8 °C)  Pulse:  [72-95] 95  Resp:  [13-24] 19  SpO2:  [90 %-99 %] 97 %  BP: (113-145)/(64-75) 137/70     Weight: 52.6 kg (115 lb 15.4 oz)  Body mass index is 20.54 kg/m².    Intake/Output Summary (Last 24 hours) at 5/28/2024 1305  Last data filed at 5/27/2024 1405  Gross per 24 hour   Intake 24.98 ml   Output --   Net 24.98 ml      Physical Exam  Constitutional:       General: She is not in acute distress.  HENT:      Head: Normocephalic.      Right Ear: External ear normal.      Left Ear: External ear normal.      Nose: Nose normal.   Eyes:      General: No scleral icterus.  Cardiovascular:      Rate and Rhythm: Normal rate.   Pulmonary:      Effort: Pulmonary effort is normal.   Abdominal:      Palpations: Abdomen is soft.      Tenderness: There is no abdominal tenderness.      Comments: Hannah noted to abdomen   Musculoskeletal:      Comments: Debility   Skin:     Comments: Swelling and redness noted to left great toe, nontender   Neurological:      General: No focal deficit present.      Mental Status: She is alert and oriented to person, place, and time.         MELD 3.0: 11 at 5/28/2024  2:37 AM  MELD-Na: 6 at 5/28/2024  2:37 AM  Calculated from:  Serum Creatinine: 0.7 mg/dL (Using  min of 1 mg/dL) at 5/28/2024  2:37 AM  Serum Sodium: 138 mmol/L (Using max of 137 mmol/L) at 5/28/2024  2:37 AM  Total Bilirubin: 0.3 mg/dL (Using min of 1 mg/dL) at 5/28/2024  2:37 AM  Serum Albumin: 1.5 g/dL at 5/28/2024  2:37 AM  INR(ratio): 1.0 at 5/28/2024  2:37 AM  Age at listing (hypothetical): 73 years  Sex: Female at 5/28/2024  2:37 AM      Significant Labs:  CBC:  Recent Labs   Lab 05/26/24 2015 05/27/24  0901 05/28/24  0237   WBC 13.46* 10.18 7.08  7.13   HGB 8.6* 8.8* 8.0*  8.1*   HCT 28.3* 28.0* 25.4*  26.3*   * 511* 517*  539*     CMP:  Recent Labs   Lab 05/27/24  0301 05/28/24  0237   * 138   K 3.4* 3.2*    108   CO2 21* 22*   GLU 79 78   BUN 4* 4*   CREATININE 0.7 0.7   CALCIUM 7.5* 7.3*   PROT 4.9* 4.7*   ALBUMIN 1.6* 1.5*   BILITOT 0.4 0.3   ALKPHOS 63 59   AST 40 30   ALT 32 27   ANIONGAP 9 8     PTINR:  Recent Labs   Lab 05/27/24 0301 05/28/24 0237   INR 1.0 1.0       Significant Procedures:   Dobutamine Stress Test with Color Flow: No results found. However, due to the size of the patient record, not all encounters were searched. Please check Results Review for a complete set of results.

## 2024-05-28 NOTE — ASSESSMENT & PLAN NOTE
Jessica Floyd is a 73 y.o. female who presented with a GIB s/p IR embolization of a tertiary branch of the ileal artery now with severe LLQ pain. She is now s/p diagnostic laparotomy converted to ex lap with small bowel resection and primary tissue repair of bilateral inguinal hernias 5/17. Previously with ROBF and tolerating diet, now with emesis and dilated bowel concerning for ileus s/p NGT decompression.     - Ok to advance to full liquid diet today   - Hgb stable, can trend daily  - Aspiration precautions  - Replete lytes PRN   - K>4, Mag>2, Phos>3  - Abx per primary  - Okay for PT/OT. Would recommend early mobilization as tolerated  - Remainder of care per primary team  - Please contact general surgery with any questions, concerns, or clinical status changes  - Will plan to remove juana prior to discharge from hospital.

## 2024-05-28 NOTE — PLAN OF CARE
Care plan reviewed.  Problem: Adult Inpatient Plan of Care  Goal: Plan of Care Review  Outcome: Progressing  Goal: Patient-Specific Goal (Individualized)  Outcome: Progressing  Goal: Absence of Hospital-Acquired Illness or Injury  Outcome: Progressing  Goal: Optimal Comfort and Wellbeing  Outcome: Progressing  Goal: Readiness for Transition of Care  Outcome: Progressing     Problem: Wound  Goal: Optimal Coping  Outcome: Progressing  Goal: Optimal Functional Ability  Outcome: Progressing  Goal: Absence of Infection Signs and Symptoms  Outcome: Progressing  Goal: Improved Oral Intake  Outcome: Progressing  Goal: Optimal Pain Control and Function  Outcome: Progressing  Goal: Skin Health and Integrity  Outcome: Progressing  Goal: Optimal Wound Healing  Outcome: Progressing     Problem: Fall Injury Risk  Goal: Absence of Fall and Fall-Related Injury  Outcome: Progressing     Problem: Skin Injury Risk Increased  Goal: Skin Health and Integrity  Outcome: Progressing     Problem: Infection  Goal: Absence of Infection Signs and Symptoms  Outcome: Progressing     Problem: Gastrointestinal Bleeding  Goal: Optimal Coping with Acute Illness  Outcome: Progressing  Goal: Hemostasis  Outcome: Progressing     Problem: Intestinal Obstruction  Goal: Optimal Bowel Function  Outcome: Progressing  Goal: Fluid and Electrolyte Balance  Outcome: Progressing  Goal: Absence of Infection Signs and Symptoms  Outcome: Progressing  Goal: Optimize Nutrition Status  Outcome: Progressing  Goal: Optimal Pain Control and Function  Outcome: Progressing

## 2024-05-28 NOTE — PLAN OF CARE
Problem: Adult Inpatient Plan of Care  Goal: Plan of Care Review  Outcome: Met  Goal: Patient-Specific Goal (Individualized)  Outcome: Met  Goal: Absence of Hospital-Acquired Illness or Injury  Outcome: Met  Goal: Optimal Comfort and Wellbeing  Outcome: Met  Goal: Readiness for Transition of Care  Outcome: Met     Problem: Wound  Goal: Optimal Coping  Outcome: Met  Goal: Optimal Functional Ability  Outcome: Met  Goal: Absence of Infection Signs and Symptoms  Outcome: Met  Goal: Improved Oral Intake  Outcome: Met  Goal: Optimal Pain Control and Function  Outcome: Met  Goal: Skin Health and Integrity  Outcome: Met  Goal: Optimal Wound Healing  Outcome: Met     Problem: Fall Injury Risk  Goal: Absence of Fall and Fall-Related Injury  Outcome: Met     Problem: Skin Injury Risk Increased  Goal: Skin Health and Integrity  Outcome: Met     Problem: Infection  Goal: Absence of Infection Signs and Symptoms  Outcome: Met     Problem: Gastrointestinal Bleeding  Goal: Optimal Coping with Acute Illness  Outcome: Met  Goal: Hemostasis  Outcome: Met     Problem: Intestinal Obstruction  Goal: Optimal Bowel Function  Outcome: Met  Goal: Fluid and Electrolyte Balance  Outcome: Met  Goal: Absence of Infection Signs and Symptoms  Outcome: Met  Goal: Optimize Nutrition Status  Outcome: Met  Goal: Optimal Pain Control and Function  Outcome: Met

## 2024-05-28 NOTE — PLAN OF CARE
I have reviewed the chart of Jessica Floyd who is hospitalized for the following:    Active Hospital Problems    Diagnosis    *Acute lower GI bleeding    Left leg pain    Incarcerated inguinal hernia    Ischemia, bowel    History Situational (ie Stress Induced) syncope ([facility] admission 12-25-23    (HFpEF) heart failure with preserved ejection fraction    Pulmonary emphysema    Acute hypoxic on chronic hypercapnic respiratory failure    Gout    Impaired mobility    Acute blood loss anemia    Leukocytosis    Hypertension    Hypokalemia    Bilateral carotid artery stenosis     S/p R CCA stent due to complication from cerebral angiography          Caitlyn Su PAAMANDEEP  [facility]

## 2024-05-28 NOTE — TELEPHONE ENCOUNTER
Get fluticasone (Flonase) or triamcinolone (Nasacort) nasal spray at the pharmacy.  Use 2 spray in each nostril once a day for 2 weeks.  After 2 weeks, decrease to one spray in each nostril once a day until symptoms have resolved.  You may then stop using the spray, but resume use if symptoms return.     Use Mucinex-D; 1 tablet every 12 hours as needed for congestion.  Take with large glass of water.  You need to ask the pharmacist for this medication, but you do not need a prescription for this medication.    Drink plenty of fluids and get plenty of rest.    Using a Neti-pot or saline sinus rinse can help provide relief of sinus congestion.  Steam from a hot shower can also help open the sinuses.    Minimize use of any nasal decongestants (like Afrin) to no more than 3 days.  Using these medications for more than 3 days can cause worsening of your sinus congestion.    Follow up with your primary care provider in about 5-7 days after starting antibiotic if symptoms are not improving or earlier if symptoms worsen.  If you do not have a primary care provider, I encourage you to establish with a provider as soon as possible.      Veronica, please let her  know that I am in the office only on Tues and Wed. I will have this letter for  today after clinic sessions end around 330/4pm. Thanks, PV

## 2024-05-29 LAB
ALBUMIN SERPL BCP-MCNC: 1.7 G/DL (ref 3.5–5.2)
ALP SERPL-CCNC: 68 U/L (ref 55–135)
ALT SERPL W/O P-5'-P-CCNC: 25 U/L (ref 10–44)
ANION GAP SERPL CALC-SCNC: 8 MMOL/L (ref 8–16)
AST SERPL-CCNC: 30 U/L (ref 10–40)
BILIRUB SERPL-MCNC: 0.3 MG/DL (ref 0.1–1)
BUN SERPL-MCNC: 3 MG/DL (ref 8–23)
CALCIUM SERPL-MCNC: 7.4 MG/DL (ref 8.7–10.5)
CHLORIDE SERPL-SCNC: 109 MMOL/L (ref 95–110)
CO2 SERPL-SCNC: 19 MMOL/L (ref 23–29)
CREAT SERPL-MCNC: 0.7 MG/DL (ref 0.5–1.4)
EST. GFR  (NO RACE VARIABLE): >60 ML/MIN/1.73 M^2
GLUCOSE SERPL-MCNC: 83 MG/DL (ref 70–110)
MAGNESIUM SERPL-MCNC: 1.6 MG/DL (ref 1.6–2.6)
PHOSPHATE SERPL-MCNC: 2.7 MG/DL (ref 2.7–4.5)
POTASSIUM SERPL-SCNC: 4 MMOL/L (ref 3.5–5.1)
PROT SERPL-MCNC: 5 G/DL (ref 6–8.4)
SODIUM SERPL-SCNC: 136 MMOL/L (ref 136–145)

## 2024-05-29 PROCEDURE — 80053 COMPREHEN METABOLIC PANEL: CPT | Mod: NTX

## 2024-05-29 PROCEDURE — 99900035 HC TECH TIME PER 15 MIN (STAT): Mod: NTX

## 2024-05-29 PROCEDURE — 94660 CPAP INITIATION&MGMT: CPT | Mod: NTX

## 2024-05-29 PROCEDURE — 25000003 PHARM REV CODE 250: Mod: NTX | Performed by: INTERNAL MEDICINE

## 2024-05-29 PROCEDURE — 83735 ASSAY OF MAGNESIUM: CPT | Mod: NTX

## 2024-05-29 PROCEDURE — 97110 THERAPEUTIC EXERCISES: CPT | Mod: NTX,CQ

## 2024-05-29 PROCEDURE — 97530 THERAPEUTIC ACTIVITIES: CPT | Mod: NTX,CO

## 2024-05-29 PROCEDURE — 25000003 PHARM REV CODE 250: Mod: NTX

## 2024-05-29 PROCEDURE — 99232 SBSQ HOSP IP/OBS MODERATE 35: CPT | Mod: NTX,,, | Performed by: NURSE PRACTITIONER

## 2024-05-29 PROCEDURE — 84100 ASSAY OF PHOSPHORUS: CPT | Mod: NTX

## 2024-05-29 PROCEDURE — 97535 SELF CARE MNGMENT TRAINING: CPT | Mod: NTX,CO

## 2024-05-29 PROCEDURE — 20600001 HC STEP DOWN PRIVATE ROOM: Mod: NTX

## 2024-05-29 PROCEDURE — 36415 COLL VENOUS BLD VENIPUNCTURE: CPT | Mod: NTX

## 2024-05-29 RX ADMIN — Medication 6 MG: at 08:05

## 2024-05-29 RX ADMIN — ALLOPURINOL 200 MG: 100 TABLET ORAL at 08:05

## 2024-05-29 RX ADMIN — DICLOFENAC SODIUM 2 G: 10 GEL TOPICAL at 08:05

## 2024-05-29 RX ADMIN — AMLODIPINE BESYLATE 5 MG: 5 TABLET ORAL at 08:05

## 2024-05-29 RX ADMIN — PANTOPRAZOLE SODIUM 40 MG: 40 TABLET, DELAYED RELEASE ORAL at 04:05

## 2024-05-29 RX ADMIN — TIOTROPIUM BROMIDE INHALATION SPRAY 2 PUFF: 3.12 SPRAY, METERED RESPIRATORY (INHALATION) at 09:05

## 2024-05-29 RX ADMIN — FLUTICASONE FUROATE AND VILANTEROL TRIFENATATE 1 PUFF: 100; 25 POWDER RESPIRATORY (INHALATION) at 10:05

## 2024-05-29 RX ADMIN — ACETAMINOPHEN 650 MG: 650 SOLUTION ORAL at 08:05

## 2024-05-29 RX ADMIN — PANTOPRAZOLE SODIUM 40 MG: 40 TABLET, DELAYED RELEASE ORAL at 05:05

## 2024-05-29 RX ADMIN — COLCHICINE 0.6 MG: 0.6 TABLET, FILM COATED ORAL at 08:05

## 2024-05-29 RX ADMIN — QUETIAPINE FUMARATE 50 MG: 25 TABLET ORAL at 08:05

## 2024-05-29 RX ADMIN — ACETAMINOPHEN 650 MG: 650 SOLUTION ORAL at 04:05

## 2024-05-29 NOTE — PLAN OF CARE
"BRYAN received a called from Alyssia who works for [employer]. Alyssia stated that pt auth would have to be sent to medical review unit in regards to rehab. Alyssia explained that [facility] doesn't see the need for rehab at this time for pt, "[facility] doesn't see clinical need for 3 days a week face visits." but are willing to approve pt for SNF.    BRYAN spoke with pt and daughter about what [facility] stated.  Daughter wants to speak with Dr. Davis and BRYAN to determine what's the best DC plan for pt.   Will follow up.     Jesenia Moran MSW, CSW    "

## 2024-05-29 NOTE — ASSESSMENT & PLAN NOTE
Transferred from ICU with a history of GI bleed, seen by GI and General surgery, status post embolization and laparotomy.  Had to be stepped back up to ICU for ileus, GI bleed and hypoxia.  Clinically improved and was step-down to medicine.  Hemoglobin has been stable patient had bowel movements devoid of blood.  We will switch Protonix to p.o. b.i.d..  General surgery okay with advancement to regular diet with removal of staples on 05/31/2024. Tolerated diet well

## 2024-05-29 NOTE — PROGRESS NOTES
"[facility] ([address] Medicine  Progress Note    Patient Name: Jessica Floyd  MRN: [id number]  Patient Class: IP- Inpatient   Admission Date: 5/14/2024  Length of Stay: 15 days  Attending Physician: Danis Davis MD  Primary Care Provider: Effie Olmedo MD        Subjective:     Principal Problem:Acute lower GI bleeding        HPI:  Mrs. Floyd is a 73 year old female with PMH notable for COPD on home 2L NC, pulm HTN, R carotid stent on ASA, HFpEF, anemia, and ERIK on infusions who presented to [facility] ED with the melena.  is at bedside and reports that the patient had an episode of diarrhea and lethargy yesterday. She reports that it felt as if "she was going to pass out", so she was using her walker to get around. Patient's  reports that this has never happened before. Patient denies excessive OTC NSAID use. She reports that she drinks 2 wine glasses a day. This morning when the patient woke up she felt very lethargic. Patient went to use the bathroom, and  noted bright red stool in the bowl. Patient reported a pre syncopal episode and felt lightheaded. She admits to weakness, SOB, light-headedness, hematochezia, and pre syncope. Patient denies nausea, vomiting, hematemesis, falls, confusion, chest pain, urinary changes, and fevers. Patient reports taking aspirin daily but no DOAC.      Hemoglobin 5.9 at presentation to ED (baseline ~10). GI and IR consulted given concern for active bleed seen on CTA. In the emergency department she was given 1L IVF and a 80 mg IV protonix bolus. Critical care medicine. consulted for GIB. CTA in the Ed revealed "Acute gastrointestinal bleed at the level of the cecum". IR planning for embolization today. Patient is also receiving 2 pRBC due to acute anemia.     Overview/Hospital Course:  73 year old female with PMH notable for COPD, pulm HTN, R carotid stent on ASA, HFpEF, anemia, and ERIK on infusions who presented to [facility] ED with melena. " Determined to have an active bleed at the level of the cecum s/p embolization with IR. Post operatively she was noted to have increasing abdominal pain and underwent ex lap with general surgery for incarcerated inguinal hernia on 5/17. She has been stable postoperatively. She was requiring intermittent precedex for severe delirium/agitation but has now been off precedex for 2 days and is stable for stepdown. Hgb stable, tolerating regular diet.      Deemed appropriate for transfer to the floor on 5/22/2024, and was accepted to  team Z for further care and management.  On 5/23/24 patient had significant clinical decline, she had bowel obstruction, respiratory failure and fall in hemoglobin secondary to GI bleed.  She was transferred to ICU NG tube placed and general surgery consulted.  During stay in ICU, patient had improvement, NG tube was removed, no further reports of bleed and hemoglobin stable, she was accepted to [facility] and stepped down to medicine again.  Insurance denied acute rehab but is okay with skilled nursing facility    Interval History: Insurance denied acute rehab but is okay with skilled nursing facility.  Tolerated diet without issues.  No complaints at this time.  Opted to do skilled nursing facility    Review of Systems   All other systems reviewed and are negative.    Objective:     Vital Signs (Most Recent):  Temp: 98.1 °F (36.7 °C) (05/29/24 0827)  Pulse: 84 (05/29/24 1009)  Resp: (!) 21 (05/29/24 1009)  BP: 137/79 (05/29/24 0827)  SpO2: 97 % (05/29/24 1010) Vital Signs (24h Range):  Temp:  [97.6 °F (36.4 °C)-98.1 °F (36.7 °C)] 98.1 °F (36.7 °C)  Pulse:  [76-97] 84  Resp:  [16-25] 21  SpO2:  [94 %-99 %] 97 %  BP: (118-137)/(69-79) 137/79     Weight: 52.6 kg (115 lb 15.4 oz)  Body mass index is 20.54 kg/m².    Intake/Output Summary (Last 24 hours) at 5/29/2024 1232  Last data filed at 5/28/2024 1850  Gross per 24 hour   Intake 480 ml   Output 900 ml   Net -420 ml      Physical  Exam  Constitutional:       General: She is not in acute distress.  HENT:      Head: Normocephalic.      Right Ear: External ear normal.      Left Ear: External ear normal.      Nose: Nose normal.   Eyes:      General: No scleral icterus.  Cardiovascular:      Rate and Rhythm: Normal rate.   Pulmonary:      Effort: Pulmonary effort is normal.   Abdominal:      Palpations: Abdomen is soft.      Tenderness: There is no abdominal tenderness.      Comments: Hannah noted to abdomen   Musculoskeletal:      Comments: Debility   Neurological:      General: No focal deficit present.      Mental Status: She is alert and oriented to person, place, and time.        MELD 3.0: 11 at 5/29/2024  2:36 AM  MELD-Na: 6 at 5/29/2024  2:36 AM  Calculated from:  Serum Creatinine: 0.7 mg/dL (Using min of 1 mg/dL) at 5/29/2024  2:36 AM  Serum Sodium: 136 mmol/L at 5/29/2024  2:36 AM  Total Bilirubin: 0.3 mg/dL (Using min of 1 mg/dL) at 5/29/2024  2:36 AM  Serum Albumin: 1.7 g/dL at 5/29/2024  2:36 AM  INR(ratio): 1.0 at 5/28/2024  2:37 AM  Age at listing (hypothetical): 73 years  Sex: Female at 5/29/2024  2:36 AM      Significant Labs:  CBC:  Recent Labs   Lab 05/28/24 0237   WBC 7.08  7.13   HGB 8.0*  8.1*   HCT 25.4*  26.3*   *  539*     CMP:  Recent Labs   Lab 05/28/24 0237 05/29/24 0236    136   K 3.2* 4.0    109   CO2 22* 19*   GLU 78 83   BUN 4* 3*   CREATININE 0.7 0.7   CALCIUM 7.3* 7.4*   PROT 4.7* 5.0*   ALBUMIN 1.5* 1.7*   BILITOT 0.3 0.3   ALKPHOS 59 68   AST 30 30   ALT 27 25   ANIONGAP 8 8     PTINR:  Recent Labs   Lab 05/28/24 0237   INR 1.0       Significant Procedures:   Dobutamine Stress Test with Color Flow: No results found. However, due to the size of the patient record, not all encounters were searched. Please check Results Review for a complete set of results.        Assessment/Plan:      * Acute lower GI bleeding  Transferred from ICU with a history of GI bleed, seen by GI and General  surgery, status post embolization and laparotomy.  Had to be stepped back up to ICU for ileus, GI bleed and hypoxia.  Clinically improved and was step-down to medicine.  Hemoglobin has been stable patient had bowel movements devoid of blood.  We will switch Protonix to p.o. b.i.d..  General surgery okay with advancement to regular diet with removal of staples on 05/31/2024. Tolerated diet well    Impaired mobility  For d/c to SNF    (HFpEF) heart failure with preserved ejection fraction  Echo done in October of 2023 shows no reports of systolic or diastolic heart failure or significant valvulopathy      Pulmonary emphysema  On home oxygen.  Continue with nebs p.r.n. and home inhaler    Acute blood loss anemia  See #1    Leukocytosis  Resolved. Completed course of IV Zosyn      Hypertension  BP meds resumed    Hypokalemia  Patient has hypokalemia which is Acute and currently controlled. Most recent potassium levels reviewed-   Lab Results   Component Value Date    K 3.2 (L) 05/28/2024   . Will continue potassium replacement per protocol and recheck repeat levels after replacement completed.       VTE Risk Mitigation (From admission, onward)           Ordered     Place sequential compression device  Until discontinued         05/23/24 0826     IP VTE HIGH RISK PATIENT  Once         05/15/24 0104     Place sequential compression device  Until discontinued         05/15/24 0104                    Discharge Planning   DEBBIE: 5/29/2024     Code Status: Full Code   Is the patient medically ready for discharge?:     Reason for patient still in hospital (select all that apply): Patient trending condition  Discharge Plan A: Rehab ([facility] accepted patient for rehab.)   Discharge Delays: None known at this time              Danis Davis MD  Department of Hospital Medicine   [facility] ([place])

## 2024-05-29 NOTE — PT/OT/SLP PROGRESS
Physical Therapy Treatment    Patient Name:  Jessica Flyod   MRN:  [id number]    Recommendations:     Discharge Recommendations: High Intensity Therapy  Discharge Equipment Recommendations: none  Barriers to discharge: None    Assessment:     Jessica Floyd is a 73 y.o. female admitted with a medical diagnosis of Acute lower GI bleeding.  She presents with the following impairments/functional limitations: weakness, impaired endurance, impaired self care skills, pain, impaired cardiopulmonary response to activity Pt tolerated treatment session well today. Pt reporting having worked with OT earlier in the day, having walked to and from the bathroom. Pt reporting she would not do any standing activities, yet was agreeable to seated LE exercises. Patient remains appropriate for continued skilled services within the acute environment and goals remain appropriate.   .    Rehab Prognosis: Good; patient would benefit from acute skilled PT services to address these deficits and reach maximum level of function.    Recent Surgery: Procedure(s) (LRB):  LAPAROSCOPY, DIAGNOSTIC (N/A)  LAPAROTOMY, EXPLORATORY (N/A)  EXCISION, SMALL INTESTINE (N/A)  REPAIR, HERNIA, INGUINAL (Bilateral) 12 Days Post-Op    Plan:     During this hospitalization, patient to be seen 4 x/week to address the identified rehab impairments via gait training, therapeutic activities, therapeutic exercises and progress toward the following goals:    Plan of Care Expires:  06/23/24    Subjective     Chief Complaint: L knee pain  Patient/Family Comments/goals: Pt agreeable to seated LE exercises   Pain/Comfort:  Pain Rating 1: 10/10  Location - Side 1: Right  Location - Orientation 1: generalized  Location 1: knee  Pain Addressed 1: Reposition, Distraction  Pain Rating Post-Intervention 1: 10/10      Objective:     Communicated with Rn prior to session.  Patient found up in chair with telemetry, pulse ox (continuous) upon PT entry to room.     General Precautions:  Standard, fall  Orthopedic Precautions: N/A  Braces: N/A  Respiratory Status: Room air     Functional Mobility:  T/F and Gait: pt unwilling to perform     Pt performed 20 repetitions of seated B LE exercises consisting of: Marching, LAQ, ABD/ADD, heel raises, and toe raises.   HS stretch 30 second holds B x 2        AM-PAC 6 CLICK MOBILITY  Turning over in bed (including adjusting bedclothes, sheets and blankets)?: 3  Sitting down on and standing up from a chair with arms (e.g., wheelchair, bedside commode, etc.): 3  Moving from lying on back to sitting on the side of the bed?: 3  Moving to and from a bed to a chair (including a wheelchair)?: 3  Need to walk in hospital room?: 3  Climbing 3-5 steps with a railing?: 1  Basic Mobility Total Score: 16       Treatment & Education:  Therapist provided instruction for proper technique and educated for safety during LE exercises. As well as proper body mechanics, energy conservation, and fall prevention strategies during tasks listed above, and the effects of prolonged immobility and the importance of performing EOB/OOB activity and exercises to promote healing and reduce recovery time.       Patient left up in chair with all lines intact, call button in reach, Rn notified, and daughter  present..    GOALS:   Multidisciplinary Problems       Physical Therapy Goals          Problem: Physical Therapy    Goal Priority Disciplines Outcome Goal Variances Interventions   Physical Therapy Goal     PT, PT/OT Progressing     Description: Goals to be met by: 2024    Patient will increase functional independence with mobility by performin. Supine to sit with Minimal Assistance -met   2. Sit to stand transfer with modified Independent using RW  3. Gait  x 50 feet with Contact Guard Assistance using Rolling Walker.   4. Ascend/descend 3 stair with bilateral Handrails Contact Guard Assistance using RW.   5. Pt perform AROM there exer LLE x 15 reps to increase strength  for increased mobility.                          Time Tracking:     PT Received On: 05/29/24  PT Start Time: 1335     PT Stop Time: 1354  PT Total Time (min): 19 min     Billable Minutes: Therapeutic Exercise 19    Treatment Type: Treatment  PT/PTA: PTA     Number of PTA visits since last PT visit: 1 05/29/2024

## 2024-05-29 NOTE — PROGRESS NOTES
[facility] ([place] [id number])  Physical Medicine & Rehab  Progress Note    Patient Name: Jessica Floyd  MRN: [id number]  Admission Date: 5/14/2024  Length of Stay: 15 days  Attending Physician: Danis Davis MD    Subjective:     Principal Problem:Acute lower GI bleeding    Hospital Course:   5/20/24: Participated w/ PT. Sit <> Stand Transfer: Moderate Assistance from eob with no AD, Bed <> Chair: Total Assistance with no AD. Pt unable to take steps during transfer   5/22/24: Participated w/ OT. Feeding: set-up  G/H seated with SBA; unable to tolerate standing g/h skills due to impaired endurance.   LE dressing; TOTAL A   5/26/24: Participated w/ PT. Sit to Stand:  contact guard assistance with rolling walker. Gait: pt received gait training ~ 3 steps forward with RW, O2 and CGA. Pt was followed by chair for safety    Interval History 5/29/2024:  Patient is seen for follow-up PM&R evaluation and recommendations: Tolerating regular diet. Stepped down from critical care. Participating with therapy and recommending high intensity.     HPI, Past Medical, Family, and Social History remains the same as documented in the initial encounter.    Scheduled Medications:    allopurinoL  200 mg Oral Daily    amLODIPine  5 mg Oral Daily    colchicine  0.6 mg Oral Daily    cyanocobalamin  1,000 mcg Intramuscular Weekly    diclofenac sodium  2 g Topical (Top) BID    fluticasone furoate-vilanteroL  1 puff Inhalation Daily    pantoprazole  40 mg Oral BID AC    QUEtiapine  50 mg Oral QHS    tiotropium bromide  2 puff Inhalation Daily     Diagnostic Results:   Labs: Reviewed  ECG: Reviewed  CT: Reviewed    PRN Medications:   Current Facility-Administered Medications:     sodium chloride 0.9%, , Intravenous, PRN    acetaminophen, 650 mg, Oral, Q6H PRN    albuterol-ipratropium, 3 mL, Nebulization, Q4H PRN    aluminum & magnesium hydroxide-simethicone, 30 mL, Oral, Q6H PRN    LIDOcaine, , Topical (Top), BID PRN    melatonin, 6 mg,  Oral, Nightly PRN    ondansetron, 4 mg, Intravenous, Q4H PRN    sodium chloride, 1 spray, Each Nostril, PRN    sodium chloride 0.9%, 10 mL, Intravenous, PRN    Review of Systems   Constitutional:  Positive for activity change. Negative for fatigue and fever.   Respiratory:  Negative for cough and shortness of breath.    Musculoskeletal:  Positive for gait problem.   Neurological:  Positive for weakness.   Psychiatric/Behavioral:  Negative for agitation, behavioral problems and confusion.      Objective:     Vital Signs (Most Recent):  Temp: 98.1 °F (36.7 °C) (05/29/24 0827)  Pulse: 84 (05/29/24 1009)  Resp: (!) 21 (05/29/24 1009)  BP: 137/79 (05/29/24 0827)  SpO2: 97 % (05/29/24 1010)    Vital Signs (24h Range):  Temp:  [97.6 °F (36.4 °C)-98.1 °F (36.7 °C)] 98.1 °F (36.7 °C)  Pulse:  [76-97] 84  Resp:  [16-25] 21  SpO2:  [94 %-99 %] 97 %  BP: (118-146)/(69-80) 137/79     Physical Exam  Vitals and nursing note reviewed.   Constitutional:       Appearance: Normal appearance.   HENT:      Head: Normocephalic and atraumatic.      Nose: Nose normal.      Mouth/Throat:      Mouth: Mucous membranes are moist.   Eyes:      Extraocular Movements: Extraocular movements intact.      Pupils: Pupils are equal, round, and reactive to light.   Pulmonary:      Effort: Pulmonary effort is normal. No respiratory distress.   Abdominal:      Palpations: Abdomen is soft.      Comments: Staples in place   Musculoskeletal:      Cervical back: Normal range of motion.      Comments: BLE weakness, LLE>RLE  Deconditioned    Skin:     General: Skin is warm.   Neurological:      Mental Status: She is alert. Mental status is at baseline.      Motor: Weakness present.      Gait: Gait abnormal.   Psychiatric:         Mood and Affect: Mood normal.         Behavior: Behavior normal.     Assessment/Plan:      * Acute lower GI bleeding  - S/p embolization with IR 5/14.   - Colonoscopy performed 5/16 without evidence of active bleed, old blood/clots  evacuated.   - underwent diagnostic laparotomy converted to ex lap with small bowel resection and primary tissue repair of bilateral inguinal hernias 5/17/24.     Impaired mobility  - Related to prolonged/acute hospital course.     Recommendations  -  Encourage mobility, OOB in chair at least 3 hours per day, and early ambulation as appropriate  -  PT/OT evaluate and treat  -  Pain management  -  Monitor for and prevent skin breakdown and pressure ulcers  Early mobility, repositioning/weight shifting every 20-30 minutes when sitting, turn patient every 2 hours, proper mattress/overlay and chair cushioning, pressure relief/heel protector boots  -  DVT prophylaxis    -  Reviewed discharge options (IP rehab, SNF, HH therapy, and OP therapy)     PM&R Recommendation:     At this time, the PM&R team has reviewed this patient's ongoing medical case including inpatient diagnosis, medical history, clinical examination, labs, vitals, current social and functional history to provide the post-acute recommendation as follows:     RECOMMENDATIONS: inpatient rehabilitation due to good motivation/participation with therapies, has been determined to tolerate 3 hours of therapy and good potential for recovery.     The patient will be admitted for comprehensive interdisciplinary inpatient rehabilitation to address the impairments due to medical diagnosis of Debility. The patient will benefit from an inpatient rehabilitation program to promote functional recovery, implement compensatory strategies and will undergo assessment for needs for durable medical equipment for safe discharge to the community. This patient will benefit from a coordinated interdisciplinary rehabilitation program services that require close monitoring and treatment with 24-hour rehabilitative nursing and physical/occupational therapies for 3 hours/day for 5 days/week.This interdisciplinary program will be performed under the direction of a  physiatrist.    MEDICAL STABILITY:     At this time, no barriers for post-acute rehab admission.    I will sign off with the transfer of the patient to [facility] liaison who will continue to follow going forward until admission to [facility].      Niyah aLwler NP  Department of Physical Medicine & Rehab   [facility] ([place])

## 2024-05-29 NOTE — PT/OT/SLP PROGRESS
"Occupational Therapy   Treatment    Name: Jessica Floyd  MRN: [id number]  Admitting Diagnosis:  Acute lower GI bleeding  12 Days Post-Op    Recommendations:     Discharge Recommendations: High Intensity Therapy  Discharge Equipment Recommendations:  none  Barriers to discharge:       Assessment:     Jessica Floyd is a 73 y.o. female with a medical diagnosis of Acute lower GI bleeding.  She presents with the following performance deficits affecting function: weakness, impaired endurance, impaired self care skills, impaired functional mobility, gait instability, decreased safety awareness, impaired cardiopulmonary response to activity, decreased lower extremity function. The patient is very anxious with mobility and fearful of falling.     Rehab Prognosis:  Good; patient would benefit from acute skilled OT services to address these deficits and reach maximum level of function.       Plan:     Patient to be seen 4 x/week to address the above listed problems via self-care/home management, therapeutic activities, therapeutic exercises, cognitive retraining  Plan of Care Expires: 06/19/24  Plan of Care Reviewed with: patient    Subjective     Chief Complaint: anxiety and knee pain  Patient/Family Comments/goals: "I need 2 people under each arm to feel safe"  Pain/Comfort:  Pain Rating 1: 10/10  Location - Side 1: Right  Location - Orientation 1: generalized  Location 1: knee  Pain Addressed 1: Reposition, Distraction  Pain Rating Post-Intervention 1: 10/10    Objective:     Communicated with: RN prior to session.  Patient found sitting edge of bed with telemetry, pulse ox (continuous) upon OT entry to room.  A client care conference was completed by the OTR and the MARTELL prior to treatment by the MARTELL to discuss the patient's POC and current status.    General Precautions: Standard, fall    Orthopedic Precautions:N/A  Braces: N/A  Respiratory Status: Room air     Occupational Performance:     Bed Mobility:    Not performed "     Functional Mobility/Transfers:  Patient completed Sit <> Stand Transfer with contact guard assistance  with  rolling walker   Patient completed Bed <> Chair Transfer using Step Transfer technique with contact guard assistance with rolling walker  Functional Mobility: pt ambulated ~10ft x 2 trials with CGA using RW. No LOB or SOB noted    Activities of Daily Living:  Grooming: contact guard assistance for oral hygiene and hand hygiene standing at sink with RW      [facility] 6 Click ADL: 17    Treatment & Education:  Pt educated on OT POC and frequency during hospital stay.   Pt educated on UE exercises to improve function and ROM.  Pt educated on importance of OOB activity to improve function and activity tolerance.  Pt educated on proper hand placement and techniques for RW mgmt to improve safety awareness.   Pt educated on deep breathing techniques to improve anxiety and endurance.  Addressed all patient questions/concerns within MARTELL scope of practice.     Patient left up in chair with all lines intact, call button in reach, and daughter present    GOALS:   Multidisciplinary Problems       Occupational Therapy Goals          Problem: Occupational Therapy    Goal Priority Disciplines Outcome Interventions   Occupational Therapy Goal     OT, PT/OT Progressing    Description: Re-eval completed 05-25 Goals to be met by: 6/8/24     Patient will increase functional independence with ADLs by performing:    UE Dressing with Stand-by Assistance.  LE Dressing with Minimal Assistance.  Grooming while standing at sink with stand by Assistance.  Toileting from toilet with Minimal Assistance for hygiene and clothing management.   Toilet transfer to toilet with stand by assist                         Time Tracking:     OT Date of Treatment: 05/29/24  OT Start Time: 1131  OT Stop Time: 1156  OT Total Time (min): 25 min    Billable Minutes:Self Care/Home Management 12  Therapeutic Activity 13    OT/OCTAVIO: OCTAVIO     Number of OCTAVIO  visits since last OT visit: 1 5/29/2024

## 2024-05-29 NOTE — SUBJECTIVE & OBJECTIVE
Interval History: Insurance denied acute rehab but is okay with skilled nursing facility.  Tolerated diet without issues.  No complaints at this time.  Opted to do skilled nursing facility    Review of Systems   All other systems reviewed and are negative.    Objective:     Vital Signs (Most Recent):  Temp: 98.1 °F (36.7 °C) (05/29/24 0827)  Pulse: 84 (05/29/24 1009)  Resp: (!) 21 (05/29/24 1009)  BP: 137/79 (05/29/24 0827)  SpO2: 97 % (05/29/24 1010) Vital Signs (24h Range):  Temp:  [97.6 °F (36.4 °C)-98.1 °F (36.7 °C)] 98.1 °F (36.7 °C)  Pulse:  [76-97] 84  Resp:  [16-25] 21  SpO2:  [94 %-99 %] 97 %  BP: (118-137)/(69-79) 137/79     Weight: 52.6 kg (115 lb 15.4 oz)  Body mass index is 20.54 kg/m².    Intake/Output Summary (Last 24 hours) at 5/29/2024 1232  Last data filed at 5/28/2024 1850  Gross per 24 hour   Intake 480 ml   Output 900 ml   Net -420 ml      Physical Exam  Constitutional:       General: She is not in acute distress.  HENT:      Head: Normocephalic.      Right Ear: External ear normal.      Left Ear: External ear normal.      Nose: Nose normal.   Eyes:      General: No scleral icterus.  Cardiovascular:      Rate and Rhythm: Normal rate.   Pulmonary:      Effort: Pulmonary effort is normal.   Abdominal:      Palpations: Abdomen is soft.      Tenderness: There is no abdominal tenderness.      Comments: Hannah noted to abdomen   Musculoskeletal:      Comments: Debility   Neurological:      General: No focal deficit present.      Mental Status: She is alert and oriented to person, place, and time.        MELD 3.0: 11 at 5/29/2024  2:36 AM  MELD-Na: 6 at 5/29/2024  2:36 AM  Calculated from:  Serum Creatinine: 0.7 mg/dL (Using min of 1 mg/dL) at 5/29/2024  2:36 AM  Serum Sodium: 136 mmol/L at 5/29/2024  2:36 AM  Total Bilirubin: 0.3 mg/dL (Using min of 1 mg/dL) at 5/29/2024  2:36 AM  Serum Albumin: 1.7 g/dL at 5/29/2024  2:36 AM  INR(ratio): 1.0 at 5/28/2024  2:37 AM  Age at listing (hypothetical): 73  years  Sex: Female at 5/29/2024  2:36 AM      Significant Labs:  CBC:  Recent Labs   Lab 05/28/24 0237   WBC 7.08  7.13   HGB 8.0*  8.1*   HCT 25.4*  26.3*   *  539*     CMP:  Recent Labs   Lab 05/28/24 0237 05/29/24 0236    136   K 3.2* 4.0    109   CO2 22* 19*   GLU 78 83   BUN 4* 3*   CREATININE 0.7 0.7   CALCIUM 7.3* 7.4*   PROT 4.7* 5.0*   ALBUMIN 1.5* 1.7*   BILITOT 0.3 0.3   ALKPHOS 59 68   AST 30 30   ALT 27 25   ANIONGAP 8 8     PTINR:  Recent Labs   Lab 05/28/24 0237   INR 1.0       Significant Procedures:   Dobutamine Stress Test with Color Flow: No results found. However, due to the size of the patient record, not all encounters were searched. Please check Results Review for a complete set of results.

## 2024-05-29 NOTE — SUBJECTIVE & OBJECTIVE
Interval History 5/29/2024:  Patient is seen for follow-up PM&R evaluation and recommendations: Tolerating regular diet. Stepped down from critical care. Participating with therapy and recommending high intensity.     HPI, Past Medical, Family, and Social History remains the same as documented in the initial encounter.    Scheduled Medications:    allopurinoL  200 mg Oral Daily    amLODIPine  5 mg Oral Daily    colchicine  0.6 mg Oral Daily    cyanocobalamin  1,000 mcg Intramuscular Weekly    diclofenac sodium  2 g Topical (Top) BID    fluticasone furoate-vilanteroL  1 puff Inhalation Daily    pantoprazole  40 mg Oral BID AC    QUEtiapine  50 mg Oral QHS    tiotropium bromide  2 puff Inhalation Daily     Diagnostic Results:   Labs: Reviewed  ECG: Reviewed  CT: Reviewed    PRN Medications:   Current Facility-Administered Medications:     sodium chloride 0.9%, , Intravenous, PRN    acetaminophen, 650 mg, Oral, Q6H PRN    albuterol-ipratropium, 3 mL, Nebulization, Q4H PRN    aluminum & magnesium hydroxide-simethicone, 30 mL, Oral, Q6H PRN    LIDOcaine, , Topical (Top), BID PRN    melatonin, 6 mg, Oral, Nightly PRN    ondansetron, 4 mg, Intravenous, Q4H PRN    sodium chloride, 1 spray, Each Nostril, PRN    sodium chloride 0.9%, 10 mL, Intravenous, PRN    Review of Systems   Constitutional:  Positive for activity change. Negative for fatigue and fever.   Respiratory:  Negative for cough and shortness of breath.    Musculoskeletal:  Positive for gait problem.   Neurological:  Positive for weakness.   Psychiatric/Behavioral:  Negative for agitation, behavioral problems and confusion.      Objective:     Vital Signs (Most Recent):  Temp: 98.1 °F (36.7 °C) (05/29/24 0827)  Pulse: 84 (05/29/24 1009)  Resp: (!) 21 (05/29/24 1009)  BP: 137/79 (05/29/24 0827)  SpO2: 97 % (05/29/24 1010)    Vital Signs (24h Range):  Temp:  [97.6 °F (36.4 °C)-98.1 °F (36.7 °C)] 98.1 °F (36.7 °C)  Pulse:  [76-97] 84  Resp:  [16-25] 21  SpO2:  [94  %-99 %] 97 %  BP: (118-146)/(69-80) 137/79         Physical Exam  Vitals and nursing note reviewed.   Constitutional:       Appearance: Normal appearance.   HENT:      Head: Normocephalic and atraumatic.      Nose: Nose normal.      Mouth/Throat:      Mouth: Mucous membranes are moist.   Eyes:      Extraocular Movements: Extraocular movements intact.      Pupils: Pupils are equal, round, and reactive to light.   Pulmonary:      Effort: Pulmonary effort is normal. No respiratory distress.   Abdominal:      Palpations: Abdomen is soft.      Comments: Staples in place   Musculoskeletal:      Cervical back: Normal range of motion.      Comments: BLE weakness, LLE>RLE  Deconditioned    Skin:     General: Skin is warm.   Neurological:      Mental Status: She is alert. Mental status is at baseline.      Motor: Weakness present.      Gait: Gait abnormal.   Psychiatric:         Mood and Affect: Mood normal.         Behavior: Behavior normal.          NEUROLOGICAL EXAMINATION:     CRANIAL NERVES     CN III, IV, VI   Pupils are equal, round, and reactive to light.

## 2024-05-30 ENCOUNTER — PATIENT MESSAGE (OUTPATIENT)
Dept: SPORTS MEDICINE | Facility: CLINIC | Age: 74
End: 2024-05-30
Payer: MEDICARE

## 2024-05-30 LAB
ALBUMIN SERPL BCP-MCNC: 1.8 G/DL (ref 3.5–5.2)
ALP SERPL-CCNC: 75 U/L (ref 55–135)
ALT SERPL W/O P-5'-P-CCNC: 23 U/L (ref 10–44)
ANION GAP SERPL CALC-SCNC: 7 MMOL/L (ref 8–16)
AST SERPL-CCNC: 26 U/L (ref 10–40)
BILIRUB SERPL-MCNC: 0.3 MG/DL (ref 0.1–1)
BUN SERPL-MCNC: 3 MG/DL (ref 8–23)
CALCIUM SERPL-MCNC: 7.6 MG/DL (ref 8.7–10.5)
CHLORIDE SERPL-SCNC: 110 MMOL/L (ref 95–110)
CO2 SERPL-SCNC: 21 MMOL/L (ref 23–29)
CREAT SERPL-MCNC: 0.7 MG/DL (ref 0.5–1.4)
EST. GFR  (NO RACE VARIABLE): >60 ML/MIN/1.73 M^2
GLUCOSE SERPL-MCNC: 83 MG/DL (ref 70–110)
MAGNESIUM SERPL-MCNC: 1.5 MG/DL (ref 1.6–2.6)
PHOSPHATE SERPL-MCNC: 2.7 MG/DL (ref 2.7–4.5)
POTASSIUM SERPL-SCNC: 2.9 MMOL/L (ref 3.5–5.1)
PROT SERPL-MCNC: 5.1 G/DL (ref 6–8.4)
SODIUM SERPL-SCNC: 138 MMOL/L (ref 136–145)

## 2024-05-30 PROCEDURE — 20600001 HC STEP DOWN PRIVATE ROOM: Mod: NTX

## 2024-05-30 PROCEDURE — 99900035 HC TECH TIME PER 15 MIN (STAT): Mod: NTX

## 2024-05-30 PROCEDURE — 25000003 PHARM REV CODE 250: Mod: NTX

## 2024-05-30 PROCEDURE — 25000003 PHARM REV CODE 250: Mod: NTX | Performed by: STUDENT IN AN ORGANIZED HEALTH CARE EDUCATION/TRAINING PROGRAM

## 2024-05-30 PROCEDURE — 80053 COMPREHEN METABOLIC PANEL: CPT | Mod: NTX

## 2024-05-30 PROCEDURE — 97116 GAIT TRAINING THERAPY: CPT | Mod: NTX,CQ

## 2024-05-30 PROCEDURE — 63600175 PHARM REV CODE 636 W HCPCS: Mod: NTX | Performed by: STUDENT IN AN ORGANIZED HEALTH CARE EDUCATION/TRAINING PROGRAM

## 2024-05-30 PROCEDURE — 94761 N-INVAS EAR/PLS OXIMETRY MLT: CPT | Mod: NTX

## 2024-05-30 PROCEDURE — 97530 THERAPEUTIC ACTIVITIES: CPT | Mod: NTX

## 2024-05-30 PROCEDURE — 25000003 PHARM REV CODE 250: Mod: NTX | Performed by: INTERNAL MEDICINE

## 2024-05-30 PROCEDURE — 83735 ASSAY OF MAGNESIUM: CPT | Mod: NTX

## 2024-05-30 PROCEDURE — 94660 CPAP INITIATION&MGMT: CPT | Mod: NTX

## 2024-05-30 PROCEDURE — 97530 THERAPEUTIC ACTIVITIES: CPT | Mod: NTX,CQ

## 2024-05-30 PROCEDURE — 84100 ASSAY OF PHOSPHORUS: CPT | Mod: NTX

## 2024-05-30 PROCEDURE — 36415 COLL VENOUS BLD VENIPUNCTURE: CPT | Mod: NTX

## 2024-05-30 PROCEDURE — 99232 SBSQ HOSP IP/OBS MODERATE 35: CPT | Mod: NTX,,, | Performed by: NURSE PRACTITIONER

## 2024-05-30 RX ORDER — POTASSIUM CHLORIDE 750 MG/1
50 CAPSULE, EXTENDED RELEASE ORAL
Status: COMPLETED | OUTPATIENT
Start: 2024-05-30 | End: 2024-05-30

## 2024-05-30 RX ORDER — ATORVASTATIN CALCIUM 40 MG/1
80 TABLET, FILM COATED ORAL DAILY
Status: DISCONTINUED | OUTPATIENT
Start: 2024-05-31 | End: 2024-06-03 | Stop reason: HOSPADM

## 2024-05-30 RX ORDER — MAGNESIUM SULFATE HEPTAHYDRATE 40 MG/ML
2 INJECTION, SOLUTION INTRAVENOUS ONCE
Status: COMPLETED | OUTPATIENT
Start: 2024-05-30 | End: 2024-05-30

## 2024-05-30 RX ADMIN — ALLOPURINOL 200 MG: 100 TABLET ORAL at 09:05

## 2024-05-30 RX ADMIN — AMLODIPINE BESYLATE 5 MG: 5 TABLET ORAL at 09:05

## 2024-05-30 RX ADMIN — POTASSIUM CHLORIDE 50 MEQ: 750 CAPSULE, EXTENDED RELEASE ORAL at 02:05

## 2024-05-30 RX ADMIN — DICLOFENAC SODIUM 2 G: 10 GEL TOPICAL at 09:05

## 2024-05-30 RX ADMIN — PANTOPRAZOLE SODIUM 40 MG: 40 TABLET, DELAYED RELEASE ORAL at 06:05

## 2024-05-30 RX ADMIN — COLCHICINE 0.6 MG: 0.6 TABLET, FILM COATED ORAL at 09:05

## 2024-05-30 RX ADMIN — PANTOPRAZOLE SODIUM 40 MG: 40 TABLET, DELAYED RELEASE ORAL at 05:05

## 2024-05-30 RX ADMIN — FLUTICASONE FUROATE AND VILANTEROL TRIFENATATE 1 PUFF: 100; 25 POWDER RESPIRATORY (INHALATION) at 09:05

## 2024-05-30 RX ADMIN — MAGNESIUM SULFATE HEPTAHYDRATE 2 G: 40 INJECTION, SOLUTION INTRAVENOUS at 12:05

## 2024-05-30 RX ADMIN — LIDOCAINE: 40 CREAM TOPICAL at 09:05

## 2024-05-30 RX ADMIN — ACETAMINOPHEN 650 MG: 650 SOLUTION ORAL at 10:05

## 2024-05-30 RX ADMIN — Medication 6 MG: at 09:05

## 2024-05-30 RX ADMIN — QUETIAPINE FUMARATE 50 MG: 25 TABLET ORAL at 09:05

## 2024-05-30 RX ADMIN — TIOTROPIUM BROMIDE INHALATION SPRAY 2 PUFF: 3.12 SPRAY, METERED RESPIRATORY (INHALATION) at 09:05

## 2024-05-30 RX ADMIN — POTASSIUM CHLORIDE 50 MEQ: 750 CAPSULE, EXTENDED RELEASE ORAL at 12:05

## 2024-05-30 NOTE — PROGRESS NOTES
"[facility] - [facility] ([address] Medicine  Progress Note    Patient Name: Jessica Floyd  MRN: [id number]  Patient Class: IP- Inpatient   Admission Date: 5/14/2024  Length of Stay: 16 days  Attending Physician: Lexy Olivares MD  Primary Care Provider: Effie Olmedo MD        Subjective:     Principal Problem:Acute lower GI bleeding        HPI:  Mrs. Floyd is a 73 year old female with PMH notable for COPD on home 2L NC, pulm HTN, R carotid stent on ASA, HFpEF, anemia, and ERIK on infusions who presented to [facility] ED with the melena.  is at bedside and reports that the patient had an episode of diarrhea and lethargy yesterday. She reports that it felt as if "she was going to pass out", so she was using her walker to get around. Patient's  reports that this has never happened before. Patient denies excessive OTC NSAID use. She reports that she drinks 2 wine glasses a day. This morning when the patient woke up she felt very lethargic. Patient went to use the bathroom, and  noted bright red stool in the bowl. Patient reported a pre syncopal episode and felt lightheaded. She admits to weakness, SOB, light-headedness, hematochezia, and pre syncope. Patient denies nausea, vomiting, hematemesis, falls, confusion, chest pain, urinary changes, and fevers. Patient reports taking aspirin daily but no DOAC.      Hemoglobin 5.9 at presentation to ED (baseline ~10). GI and IR consulted given concern for active bleed seen on CTA. In the emergency department she was given 1L IVF and a 80 mg IV protonix bolus. Critical care medicine. consulted for GIB. CTA in the Ed revealed "Acute gastrointestinal bleed at the level of the cecum". IR planning for embolization today. Patient is also receiving 2 pRBC due to acute anemia.     Overview/Hospital Course:  73 year old female with PMH notable for COPD, pulm HTN, R carotid stent on ASA, HFpEF, anemia, and ERIK on infusions who presented to [facility] ED with " melena. Determined to have an active bleed at the level of the cecum s/p embolization with IR. Post operatively she was noted to have increasing abdominal pain and underwent ex lap with general surgery for incarcerated inguinal hernia on 5/17. She has been stable postoperatively. She was requiring intermittent precedex for severe delirium/agitation but has now been off precedex for 2 days and is stable for stepdown. Hgb stable, tolerating regular diet.      Deemed appropriate for transfer to the floor on 5/22/2024, and was accepted to  team Z for further care and management.  On 5/23/24 patient had significant clinical decline, she had bowel obstruction, respiratory failure and fall in hemoglobin secondary to GI bleed.  She was transferred to ICU NG tube placed and general surgery consulted.  During stay in ICU, patient had improvement, NG tube was removed, no further reports of bleed and hemoglobin stable, she was accepted to [facility] and stepped down to medicine again.  Insurance denied acute rehab but is okay with skilled nursing facility. Awaiting SNF placement.     Interval History: No complaints this am- had a regular BM this am. No blood in stools. No nausea, vomiting, fevers, chills, night sweats.     Objective:     Vital Signs (Most Recent):  Temp: 98 °F (36.7 °C) (05/30/24 1616)  Pulse: 95 (05/30/24 1616)  Resp: 18 (05/30/24 1616)  BP: 126/71 (05/30/24 1616)  SpO2: 99 % (05/30/24 1616) Vital Signs (24h Range):  Temp:  [97.7 °F (36.5 °C)-98.5 °F (36.9 °C)] 98 °F (36.7 °C)  Pulse:  [79-95] 95  Resp:  [16-20] 18  SpO2:  [95 %-99 %] 99 %  BP: (117-140)/(61-78) 126/71     Weight: 52.6 kg (115 lb 15.4 oz)  Body mass index is 20.54 kg/m².    Intake/Output Summary (Last 24 hours) at 5/30/2024 1831  Last data filed at 5/30/2024 0900  Gross per 24 hour   Intake --   Output 650 ml   Net -650 ml      Physical Exam  Gen: in NAD, appears stated age  Neuro: AAOx3, motor, sensory, and strength grossly intact  "BL  HEENT: NTNC, EOMI, PERRL, MMM  CVS: RRR, no m/r/g; S1/S2 auscultated with no S3 or S4; capillary refill < 2 sec  Resp: lungs CTAB, no w/r/r; no belabored breathing or accessory muscle use appreciated   Abd: staples to abdomen- vertical orientation, BS+ in all 4 quadrants; NTND, soft to palpation; no organomegaly appreciated   Extrem: pulses full, equal, and regular over all 4 extremities; no UE or LE edema BL    Significant Labs:  CBC:  No results for input(s): "WBC", "HGB", "HCT", "PLT" in the last 48 hours.  CMP:  Recent Labs   Lab 05/29/24  0236 05/30/24  0309    138   K 4.0 2.9*    110   CO2 19* 21*   GLU 83 83   BUN 3* 3*   CREATININE 0.7 0.7   CALCIUM 7.4* 7.6*   PROT 5.0* 5.1*   ALBUMIN 1.7* 1.8*   BILITOT 0.3 0.3   ALKPHOS 68 75   AST 30 26   ALT 25 23   ANIONGAP 8 7*         Assessment/Plan:      * Acute lower GI bleeding  - ICU stepdown for GIB  - s/p IR embolization and ex-lap via gen surg  - briefly back on floor, but back to ICU due to ileus and bleed- upon improvement in condition, stepped back down to floor  - stable H:H  - PPI BID  - Gen surg planning for staple removal 05/31  - Tolerating regular diet     Abrasion        Left leg pain        Incarcerated inguinal hernia        Ischemia, bowel  - s/p ex lap, gen surg following, staple removal tomorrow, advanced to regular diet today       History Situational (ie Stress Induced) syncope ([facility] admission 12-25-23        (HFpEF) heart failure with preserved ejection fraction  - Interval history and physical exam findings as described above  - Most recent TTE results:  Results for orders placed during the hospital encounter of 10/28/23    Echo    Interpretation Summary    Left Ventricle: The left ventricle is normal in size. Normal wall thickness. Normal wall motion. There is normal systolic function with a visually estimated ejection fraction of 60 - 65%. There is normal diastolic function.    Right Ventricle: Mild right ventricular " enlargement. Wall thickness is normal. Right ventricle wall motion  is normal. Systolic function is normal.    Aortic Valve: The aortic valve is a unicuspid valve. There is mild aortic valve sclerosis. There is trace aortic regurgitation.    Mitral Valve: There is mild regurgitation.    Pulmonary Artery: There is mild pulmonary hypertension. The estimated pulmonary artery systolic pressure is 40 mmHg.    IVC/SVC: Intermediate venous pressure at 8 mmHg.    - Clinically euvolemic  - Continue home cardioprudent regimen  - Strict I/Os  - 1.5L fluid restriction   - Daily weights  - Will continue to monitor on tele      Pulmonary emphysema  - On home oxygen.  Continue with nebs p.r.n. and home inhaler    Acute hypoxic on chronic hypercapnic respiratory failure  - 2/2 COPD  - currently on home oxygen     Gout  - continue allopurinol      Impaired mobility  - awaiting SNF placement    Acute blood loss anemia  - See #1    Leukocytosis  - Resolved. Completed course of IV Zosyn      Hypertension  - BP currently well-controlled  - Continue home regimen of amlodipine 5mg qday   - Will continue to monitor and further titrate antihypertensives as clinically indicated       Hypokalemia  Patient has hypokalemia which is Acute and currently controlled. Most recent potassium levels reviewed-   Lab Results   Component Value Date    K 2.9 (L) 05/30/2024   . Will continue potassium replacement per protocol and recheck repeat levels after replacement completed.     Bilateral carotid artery stenosis  - resume statin        VTE Risk Mitigation (From admission, onward)           Ordered     Place sequential compression device  Until discontinued         05/23/24 0826     IP VTE HIGH RISK PATIENT  Once         05/15/24 0104     Place sequential compression device  Until discontinued         05/15/24 0104                    Discharge Planning   DEBBIE: 5/31/2024     Code Status: Full Code   Is the patient medically ready for discharge?:      Reason for patient still in hospital (select all that apply): Patient trending condition  Discharge Plan A: Skilled Nursing Facility   Discharge Delays: (!) Payor Issues                Lexy Olivares MD  Department of Hospital Medicine   [facility] - Stepdown Flex ([place]-[facility])

## 2024-05-30 NOTE — SUBJECTIVE & OBJECTIVE
Interval History: No acute events. Tolerating diet without nausea or vomiting. Passing gas and having bowel movements without issues.     Medications:  Continuous Infusions:  Scheduled Meds:   allopurinoL  200 mg Oral Daily    amLODIPine  5 mg Oral Daily    colchicine  0.6 mg Oral Daily    cyanocobalamin  1,000 mcg Intramuscular Weekly    diclofenac sodium  2 g Topical (Top) BID    fluticasone furoate-vilanteroL  1 puff Inhalation Daily    pantoprazole  40 mg Oral BID AC    QUEtiapine  50 mg Oral QHS    tiotropium bromide  2 puff Inhalation Daily     PRN Meds:  Current Facility-Administered Medications:     sodium chloride 0.9%, , Intravenous, PRN    acetaminophen, 650 mg, Oral, Q6H PRN    albuterol-ipratropium, 3 mL, Nebulization, Q4H PRN    aluminum & magnesium hydroxide-simethicone, 30 mL, Oral, Q6H PRN    LIDOcaine, , Topical (Top), BID PRN    melatonin, 6 mg, Oral, Nightly PRN    ondansetron, 4 mg, Intravenous, Q4H PRN    sodium chloride, 1 spray, Each Nostril, PRN    sodium chloride 0.9%, 10 mL, Intravenous, PRN     Review of patient's allergies indicates:  No Known Allergies  Objective:     Vital Signs (Most Recent):  Temp: 98.5 °F (36.9 °C) (05/30/24 0820)  Pulse: 81 (05/30/24 0820)  Resp: 16 (05/30/24 0820)  BP: (!) 140/78 (05/30/24 0820)  SpO2: 95 % (05/30/24 0820) Vital Signs (24h Range):  Temp:  [97.8 °F (36.6 °C)-98.5 °F (36.9 °C)] 98.5 °F (36.9 °C)  Pulse:  [] 81  Resp:  [16-21] 16  SpO2:  [95 %-98 %] 95 %  BP: (116-173)/(61-80) 140/78     Weight: 52.6 kg (115 lb 15.4 oz)  Body mass index is 20.54 kg/m².    Intake/Output - Last 3 Shifts         05/28 0700 05/29 0659 05/29 0700 05/30 0659 05/30 0700 05/31 0659    P.O. 600      I.V. (mL/kg)       IV Piggyback       Total Intake(mL/kg) 600 (11.4)      Urine (mL/kg/hr) 900 (0.7)      Total Output 900      Net -300                      Physical Exam  Vitals reviewed.   HENT:      Head: Normocephalic and atraumatic.   Eyes:      Extraocular  Movements: Extraocular movements intact.      Conjunctiva/sclera: Conjunctivae normal.   Cardiovascular:      Rate and Rhythm: Normal rate.   Pulmonary:      Effort: Pulmonary effort is normal. No respiratory distress.   Abdominal:      General: There is no distension.      Palpations: Abdomen is soft.      Tenderness: There is no abdominal tenderness.      Comments: Lower abdominal incision with staples in place, no drainage. Healing well.   Musculoskeletal:         General: No swelling. Normal range of motion.      Cervical back: Normal range of motion.   Skin:     General: Skin is warm and dry.   Neurological:      Mental Status: She is alert.          Significant Labs:  I have reviewed all pertinent lab results within the past 24 hours.  CBC:   Recent Labs   Lab 05/28/24  0237   WBC 7.08  7.13   RBC 2.63*  2.75*   HGB 8.0*  8.1*   HCT 25.4*  26.3*   *  539*   MCV 97  96   MCH 30.4  29.5   MCHC 31.5*  30.8*     CMP:   Recent Labs   Lab 05/30/24  0309   GLU 83   CALCIUM 7.6*   ALBUMIN 1.8*   PROT 5.1*      K 2.9*   CO2 21*      BUN 3*   CREATININE 0.7   ALKPHOS 75   ALT 23   AST 26   BILITOT 0.3       Significant Diagnostics:  I have reviewed all pertinent imaging results/findings within the past 24 hours.

## 2024-05-30 NOTE — ASSESSMENT & PLAN NOTE
- BP currently well-controlled  - Continue home regimen of amlodipine 5mg qday   - Will continue to monitor and further titrate antihypertensives as clinically indicated

## 2024-05-30 NOTE — ASSESSMENT & PLAN NOTE
- follow up evaluation of skin injury.  - pt presented to [facility] ED with the melena.  - small medical adhesive related injury to right ear. Partial thickness tissue loss on initial assessment. Pink and dry.   - now pin point sized healed scab to area.  - continue to leave open to air.  - nursing to maintain pressure injury prevention measures.

## 2024-05-30 NOTE — SUBJECTIVE & OBJECTIVE
"Interval History: No complaints this am- had a regular BM this am. No blood in stools. No nausea, vomiting, fevers, chills, night sweats.     Objective:     Vital Signs (Most Recent):  Temp: 98 °F (36.7 °C) (05/30/24 1616)  Pulse: 95 (05/30/24 1616)  Resp: 18 (05/30/24 1616)  BP: 126/71 (05/30/24 1616)  SpO2: 99 % (05/30/24 1616) Vital Signs (24h Range):  Temp:  [97.7 °F (36.5 °C)-98.5 °F (36.9 °C)] 98 °F (36.7 °C)  Pulse:  [79-95] 95  Resp:  [16-20] 18  SpO2:  [95 %-99 %] 99 %  BP: (117-140)/(61-78) 126/71     Weight: 52.6 kg (115 lb 15.4 oz)  Body mass index is 20.54 kg/m².    Intake/Output Summary (Last 24 hours) at 5/30/2024 1831  Last data filed at 5/30/2024 0900  Gross per 24 hour   Intake --   Output 650 ml   Net -650 ml      Physical Exam  Gen: in NAD, appears stated age  Neuro: AAOx3, motor, sensory, and strength grossly intact BL  HEENT: NTNC, EOMI, PERRL, MMM  CVS: RRR, no m/r/g; S1/S2 auscultated with no S3 or S4; capillary refill < 2 sec  Resp: lungs CTAB, no w/r/r; no belabored breathing or accessory muscle use appreciated   Abd: staples to abdomen- vertical orientation, BS+ in all 4 quadrants; NTND, soft to palpation; no organomegaly appreciated   Extrem: pulses full, equal, and regular over all 4 extremities; no UE or LE edema BL    Significant Labs:  CBC:  No results for input(s): "WBC", "HGB", "HCT", "PLT" in the last 48 hours.  CMP:  Recent Labs   Lab 05/29/24  0236 05/30/24  0309    138   K 4.0 2.9*    110   CO2 19* 21*   GLU 83 83   BUN 3* 3*   CREATININE 0.7 0.7   CALCIUM 7.4* 7.6*   PROT 5.0* 5.1*   ALBUMIN 1.7* 1.8*   BILITOT 0.3 0.3   ALKPHOS 68 75   AST 30 26   ALT 25 23   ANIONGAP 8 7*       "

## 2024-05-30 NOTE — PLAN OF CARE
Met with pt/spouse/daughter to review discharge recommendation of SNF and is agreeable to plan    Patient/family provided list of facilities in-network with patient's payor plan. Providers that are owned, operated, or affiliated with [facility] are included on the list.     Notified that referral sent to below listed facilities from in-network list based on proximity to home/family support:   [facility]  2.  [facility]  3.  [facility]  4.  [facility]  5. [facility]  6. [facility]  7. [facility]    Patient/family instructed to identify preference.    Preferred Facility: (if more than 1, listed in order of descending preference)  [facility]    If an additional preferred facility not listed above is identified, additional referral to be sent. If above facilities unable to accept, will send additional referrals to in-network providers.       Jesenia Moran MSW, CSW

## 2024-05-30 NOTE — PT/OT/SLP PROGRESS
"Occupational Therapy   Treatment    Name: Jessica Floyd  MRN: [id number]  Admitting Diagnosis:  Acute lower GI bleeding  13 Days Post-Op    Recommendations:     Discharge Recommendations: High Intensity Therapy  Discharge Equipment Recommendations:  none  Barriers to discharge:  None    Assessment:     Jessica Floyd is a 73 y.o. female with a medical diagnosis of Acute lower GI bleeding.  She presents with expressing frustration and anxiety regarding therapy and therapeutic movement. Attempted to educate on importance of functional mobility with patient repetitively refusing all options. Patient still wanting to participate with therapy but only wanting to complete patient chosen exercises. Performance deficits affecting function are weakness, impaired endurance, impaired self care skills, impaired functional mobility, gait instability, impaired balance, decreased lower extremity function, decreased upper extremity function, impaired sensation.     Rehab Prognosis:  Good; patient would benefit from acute skilled OT services to address these deficits and reach maximum level of function.       Plan:     Patient to be seen 4 x/week to address the above listed problems via self-care/home management, therapeutic activities, therapeutic exercises, neuromuscular re-education  Plan of Care Expires: 06/19/24  Plan of Care Reviewed with: patient, spouse    Subjective     Chief Complaint: patient reporting chronic pain with multiple joint issues  Patient/Family Comments/goals: "I want therapy still because it's important"   Pain/Comfort:  Pain Rating 1: other (see comments) (unrated)  Location 1:  ("all over")  Pain Rating Post-Intervention 1: other (see comments)    Objective:     Communicated with: nursing prior to session.  Patient found up in chair with telemetry, pulse ox (continuous) upon OT entry to room.    General Precautions: Standard, fall    Orthopedic Precautions:N/A  Braces: N/A  Respiratory Status: Room air   "     [facility] 6 Click ADL: 17    Treatment & Education:    Patient refusing all functional transfers, standing exercises, or functional mobility. Reported previously ambulated to bathroom with  and PCT to complete ADLs. Despite max encouragement patient only willing to complete chosen therapeutic exercises in sitting:   - 1x15 B ankle pumps with prolonged, passive stretch on LLE due to poor ROM  -1x10 seated marches (observed poor L hip ROM)  - shoulder horizontal abduction (tactile cueing required to reduce shoulder hiking)   1x5 LAQ    Patient left up in chair with all lines intact, call button in reach, nursing notified, and  present    GOALS:   Multidisciplinary Problems       Occupational Therapy Goals          Problem: Occupational Therapy    Goal Priority Disciplines Outcome Interventions   Occupational Therapy Goal     OT, PT/OT Progressing    Description: Re-eval completed 05-25 Goals to be met by: 6/8/24     Patient will increase functional independence with ADLs by performing:    UE Dressing with Stand-by Assistance.  LE Dressing with Minimal Assistance.  Grooming while standing at sink with stand by Assistance.  Toileting from toilet with Minimal Assistance for hygiene and clothing management.   Toilet transfer to toilet with stand by assist                         Time Tracking:     OT Date of Treatment: 05/30/24  OT Start Time: 1113  OT Stop Time: 1143  OT Total Time (min): 30 min    Billable Minutes:Therapeutic Activity 30    OT/OCTAVIO: OT     Number of OCTAVIO visits since last OT visit: 0    5/30/2024

## 2024-05-30 NOTE — PLAN OF CARE
Patient in bed, resting, daughter at bedside. No complaints or concerns at this time. Safety precautions maintained and call light in reach.       Problem: Adult Inpatient Plan of Care  Goal: Plan of Care Review  Outcome: Progressing     Problem: Adult Inpatient Plan of Care  Goal: Patient-Specific Goal (Individualized)  Outcome: Progressing     Problem: Adult Inpatient Plan of Care  Goal: Absence of Hospital-Acquired Illness or Injury  Outcome: Progressing

## 2024-05-30 NOTE — ASSESSMENT & PLAN NOTE
Patient has hypokalemia which is Acute and currently controlled. Most recent potassium levels reviewed-   Lab Results   Component Value Date    K 2.9 (L) 05/30/2024   . Will continue potassium replacement per protocol and recheck repeat levels after replacement completed.

## 2024-05-30 NOTE — ASSESSMENT & PLAN NOTE
Jessica Floyd is a 73 y.o. female who presented with a GIB s/p IR embolization of a tertiary branch of the ileal artery now with severe LLQ pain. She is now s/p diagnostic laparotomy converted to ex lap with small bowel resection and primary tissue repair of bilateral inguinal hernias 5/17. Previously with ROBF and tolerating diet, now with emesis and dilated bowel concerning for ileus s/p NGT decompression.     - Regular diet as tolerated  - Labs per primary, has been stable   - Aspiration precautions  - Replete lytes PRN   - K>4, Mag>2, Phos>3  - Abx per primary  - Okay for PT/OT. Would recommend early mobilization as tolerated  - Remainder of care per primary team    Please call General Surgery prior to patient leaving so that we may remove her staples.

## 2024-05-30 NOTE — PLAN OF CARE
Patient aaox4. Patient placed on iv magnesium once, oral atorvastatin and oral potasium. Patient medication administered and patient tolerating well. Patient position changed per order. Patient complains on no pain throughout this shift. Plan of care reviewed with patient and patient verbalized understanding. No other concerns at this time.       Problem: Adult Inpatient Plan of Care  Goal: Plan of Care Review  Outcome: Progressing  Goal: Patient-Specific Goal (Individualized)  Outcome: Progressing  Goal: Absence of Hospital-Acquired Illness or Injury  Outcome: Progressing  Goal: Optimal Comfort and Wellbeing  Outcome: Progressing  Goal: Readiness for Transition of Care  Outcome: Progressing

## 2024-05-30 NOTE — PROGRESS NOTES
"[facility] ([place]-14)  Skin Integrity PALMER  Progress Note    Patient Name: Jessica Floyd  MRN: [id number]  Admission Date: 5/14/2024  Hospital Length of Stay: 16 days  Attending Physician: Lexy Olivares MD  Primary Care Provider: Effie Olmedo MD         Subjective:     HPI:  Jessica Floyd is a 73 year old female with PMH notable for COPD on home 2L NC, pulm HTN, R carotid stent on ASA, HFpEF, anemia, and ERIK on infusions who presented to [facility] ED with the melena.  is at bedside and reports that the patient had an episode of diarrhea and lethargy yesterday. She reports that it felt as if "she was going to pass out", so she was using her walker to get around. Patient's  reports that this has never happened before. Patient denies excessive OTC NSAID use. She reports that she drinks 2 wine glasses a day. This morning when the patient woke up she felt very lethargic. Patient went to use the bathroom, and  noted bright red stool in the bowl. Patient reported a pre syncopal episode and felt lightheaded. She admits to weakness, SOB, light-headedness, hematochezia, and pre syncope. Patient denies nausea, vomiting, hematemesis, falls, confusion, chest pain, urinary changes, and fevers. Patient reports taking aspirin daily but no DOAC.      Hemoglobin 5.9 at presentation to ED (baseline ~10). GI and IR consulted given concern for active bleed seen on CTA. In the emergency department she was given 1L IVF and a 80 mg IV protonix bolus. Critical care medicine consulted for GIB. CTA in the Ed revealed "Acute gastrointestinal bleed at the level of the cecum". IR planning for embolization today. Patient is also receiving 2 pRBC due to acute anemia. Pt admitted to critical care medicine for further management. Skin integrity PALMER consulted for evaluation of skin injury.    Hospital Course:   No notes on file      Follow-up For: Procedure(s) (LRB):  LAPAROSCOPY, DIAGNOSTIC (N/A)  LAPAROTOMY, " EXPLORATORY (N/A)  EXCISION, SMALL INTESTINE (N/A)  REPAIR, HERNIA, INGUINAL (Bilateral)    Post-Operative Day: 13 Days Post-Op    Scheduled Meds:   allopurinoL  200 mg Oral Daily    amLODIPine  5 mg Oral Daily    colchicine  0.6 mg Oral Daily    cyanocobalamin  1,000 mcg Intramuscular Weekly    diclofenac sodium  2 g Topical (Top) BID    fluticasone furoate-vilanteroL  1 puff Inhalation Daily    magnesium sulfate IVPB  2 g Intravenous Once    pantoprazole  40 mg Oral BID AC    potassium chloride  50 mEq Oral Q2H    QUEtiapine  50 mg Oral QHS    tiotropium bromide  2 puff Inhalation Daily     Continuous Infusions:  PRN Meds:  Current Facility-Administered Medications:     sodium chloride 0.9%, , Intravenous, PRN    acetaminophen, 650 mg, Oral, Q6H PRN    albuterol-ipratropium, 3 mL, Nebulization, Q4H PRN    aluminum & magnesium hydroxide-simethicone, 30 mL, Oral, Q6H PRN    LIDOcaine, , Topical (Top), BID PRN    melatonin, 6 mg, Oral, Nightly PRN    ondansetron, 4 mg, Intravenous, Q4H PRN    sodium chloride, 1 spray, Each Nostril, PRN    sodium chloride 0.9%, 10 mL, Intravenous, PRN    Review of Systems   Skin:  Positive for wound.     Objective:     Vital Signs (Most Recent):  Temp: 97.7 °F (36.5 °C) (05/30/24 1203)  Pulse: 83 (05/30/24 1203)  Resp: 20 (05/30/24 1203)  BP: 135/66 (05/30/24 1203)  SpO2: 97 % (05/30/24 1203) Vital Signs (24h Range):  Temp:  [97.7 °F (36.5 °C)-98.5 °F (36.9 °C)] 97.7 °F (36.5 °C)  Pulse:  [] 83  Resp:  [16-20] 20  SpO2:  [95 %-98 %] 97 %  BP: (117-173)/(61-80) 135/66     Weight: 52.6 kg (115 lb 15.4 oz)  Body mass index is 20.54 kg/m².     Physical Exam  Constitutional:       Appearance: Normal appearance.   Skin:     General: Skin is warm and dry.      Findings: Lesion present.   Neurological:      Mental Status: She is alert.          Laboratory:  All pertinent labs reviewed within the last 24 hours.    Diagnostic Results:  None    Assessment/Plan:         PALMER Skin Integrity  Evaluation      Skin Integrity PALMER evaluation of patient as part of the comprehensive skin care team.     She has been admitted for 16 days. Skin injury was noted on 5/18/24. POA no.    R ear lobe      Sacrum/buttocks          Orthopedic  Abrasion  - follow up evaluation of skin injury.  - pt presented to [facility] ED with the melena.  - small medical adhesive related injury to right ear. Partial thickness tissue loss on initial assessment. Pink and dry.   - now pin point sized healed scab to area.  - continue to leave open to air.  - nursing to maintain pressure injury prevention measures.      Sacral/buttocks region moisture related injury now healed also. No open wounds noted.    Lenore Oconnell NP  Skin Integrity PALMER  [facility] ([place])

## 2024-05-30 NOTE — ASSESSMENT & PLAN NOTE
- Interval history and physical exam findings as described above  - Most recent TTE results:  Results for orders placed during the hospital encounter of 10/28/23    Echo    Interpretation Summary    Left Ventricle: The left ventricle is normal in size. Normal wall thickness. Normal wall motion. There is normal systolic function with a visually estimated ejection fraction of 60 - 65%. There is normal diastolic function.    Right Ventricle: Mild right ventricular enlargement. Wall thickness is normal. Right ventricle wall motion  is normal. Systolic function is normal.    Aortic Valve: The aortic valve is a unicuspid valve. There is mild aortic valve sclerosis. There is trace aortic regurgitation.    Mitral Valve: There is mild regurgitation.    Pulmonary Artery: There is mild pulmonary hypertension. The estimated pulmonary artery systolic pressure is 40 mmHg.    IVC/SVC: Intermediate venous pressure at 8 mmHg.    - Clinically euvolemic  - Continue home cardioprudent regimen  - Strict I/Os  - 1.5L fluid restriction   - Daily weights  - Will continue to monitor on tele

## 2024-05-30 NOTE — ASSESSMENT & PLAN NOTE
- ICU stepdown for GIB  - s/p IR embolization and ex-lap via gen surg  - briefly back on floor, but back to ICU due to ileus and bleed- upon improvement in condition, stepped back down to floor  - stable H:H  - PPI BID  - Gen surg planning for staple removal 05/31  - Tolerating regular diet

## 2024-05-30 NOTE — SUBJECTIVE & OBJECTIVE
"  Subjective:     HPI:  Jessica Floyd is a 73 year old female with PMH notable for COPD on home 2L NC, pulm HTN, R carotid stent on ASA, HFpEF, anemia, and ERIK on infusions who [place] [facility] [place].  is at bedside and reports that the patient had an episode of diarrhea and lethargy yesterday. She reports that it felt as if "she was going to pass out", so she was using her walker to get around. Patient's  reports that this has never happened before. Patient denies excessive OTC NSAID use. She reports that she drinks 2 wine glasses a day. This morning when the patient woke up she felt very lethargic. Patient went to use the bathroom, and  noted bright red stool in the bowl. Patient reported a pre syncopal episode and felt lightheaded. She admits to weakness, SOB, light-headedness, hematochezia, and pre syncope. Patient denies nausea, vomiting, hematemesis, falls, confusion, chest pain, urinary changes, and fevers. Patient reports taking aspirin daily but no DOAC.      Hemoglobin 5.9 at presentation to ED (baseline ~10). GI and IR consulted given concern for active bleed seen on CTA. In the emergency department she was given 1L IVF and a 80 mg IV protonix bolus. Critical care medicine consulted for GIB. CTA in the Ed revealed "Acute gastrointestinal bleed at the level of the cecum". IR planning for embolization today. Patient is also receiving 2 pRBC due to acute anemia. Pt admitted to critical care medicine for further management. Skin integrity PALMER consulted for evaluation of skin injury.    Hospital Course:   No notes on file      Follow-up For: Procedure(s) (LRB):  LAPAROSCOPY, DIAGNOSTIC (N/A)  LAPAROTOMY, EXPLORATORY (N/A)  EXCISION, SMALL INTESTINE (N/A)  REPAIR, HERNIA, INGUINAL (Bilateral)    Post-Operative Day: 13 Days Post-Op    Scheduled Meds:   allopurinoL  200 mg Oral Daily    amLODIPine  5 mg Oral Daily    colchicine  0.6 mg Oral Daily    cyanocobalamin  1,000 mcg " Intramuscular Weekly    diclofenac sodium  2 g Topical (Top) BID    fluticasone furoate-vilanteroL  1 puff Inhalation Daily    magnesium sulfate IVPB  2 g Intravenous Once    pantoprazole  40 mg Oral BID AC    potassium chloride  50 mEq Oral Q2H    QUEtiapine  50 mg Oral QHS    tiotropium bromide  2 puff Inhalation Daily     Continuous Infusions:  PRN Meds:  Current Facility-Administered Medications:     sodium chloride 0.9%, , Intravenous, PRN    acetaminophen, 650 mg, Oral, Q6H PRN    albuterol-ipratropium, 3 mL, Nebulization, Q4H PRN    aluminum & magnesium hydroxide-simethicone, 30 mL, Oral, Q6H PRN    LIDOcaine, , Topical (Top), BID PRN    melatonin, 6 mg, Oral, Nightly PRN    ondansetron, 4 mg, Intravenous, Q4H PRN    sodium chloride, 1 spray, Each Nostril, PRN    sodium chloride 0.9%, 10 mL, Intravenous, PRN    Review of Systems   Skin:  Positive for wound.     Objective:     Vital Signs (Most Recent):  Temp: 97.7 °F (36.5 °C) (05/30/24 1203)  Pulse: 83 (05/30/24 1203)  Resp: 20 (05/30/24 1203)  BP: 135/66 (05/30/24 1203)  SpO2: 97 % (05/30/24 1203) Vital Signs (24h Range):  Temp:  [97.7 °F (36.5 °C)-98.5 °F (36.9 °C)] 97.7 °F (36.5 °C)  Pulse:  [] 83  Resp:  [16-20] 20  SpO2:  [95 %-98 %] 97 %  BP: (117-173)/(61-80) 135/66     Weight: 52.6 kg (115 lb 15.4 oz)  Body mass index is 20.54 kg/m².     Physical Exam  Constitutional:       Appearance: Normal appearance.   Skin:     General: Skin is warm and dry.      Findings: Lesion present.   Neurological:      Mental Status: She is alert.          Laboratory:  All pertinent labs reviewed within the last 24 hours.    Diagnostic Results:  None

## 2024-05-30 NOTE — PLAN OF CARE
[facility] ([place]  Discharge Reassessment    Primary Care Provider: Effie Olmedo MD    Expected Discharge Date: 5/30/2024    Reassessment (most recent)       Discharge Reassessment - 05/30/24 1356          Discharge Reassessment    Assessment Type Discharge Planning Reassessment     Did the patient's condition or plan change since previous assessment? No     Discharge Plan discussed with: Patient;Spouse/sig other;Adult children     Name(s) and Number(s) Sandie Floyd spouse [phone] and Ramila Floyd daughter [phone]     Communicated DEBBIE with patient/caregiver Yes     Discharge Plan A Skilled Nursing Facility     Discharge Plan B Home with family     DME Needed Upon Discharge  none     Transition of Care Barriers None     Why the patient remains in the hospital Placement issues;Insurance issues        Post-Acute Status    Post-Acute Authorization Placement     Post-Acute Placement Status Referrals Sent     Coverage PHN     Discharge Delays Payor Issues                       Discharge Plan A and Plan B have been determined by review of patient's clinical status, future medical and therapeutic needs, and coverage/benefits for post-acute care in coordination with multidisciplinary team members.    Jesenia Moran MSW, CSW

## 2024-05-30 NOTE — PROGRESS NOTES
[facility] ([facility])  General Surgery  Progress Note    Subjective:     History of Present Illness:  Jessica Floyd is a 73 y.o. female with a history of HTN, HFpEF, CKD3, GERD, and CAD who presented to the ED on 5/14/24 with melena. She is altered on my interview and so the history was collected from chart review and the family. Appears she was feeling light headed and as though she was going to pass out. She also noted hematochezia. Upon arrival she was AF, tachycardic, and hypotensive. CTA was concerning for GIB and so she was taken to IR where she was found to have extravasation from the ileal branch of the SMA. They performed a coil embolization of a tertiary branch. Following this, she was reportedly doing well from a mentation standpoint, but has continued to require blood transfusion. She had an EGD on 5/15 and C-scope on 5/16 neither of which revealed a source for her bleed although GI was not able to reach the cecum due to tortuosity. Over the last couple of days, she has had worsening abdominal pain and she is not able to participate with my interview due to AMS. Her family feels this is pain related although she has been received narcotics and ativan PRN.      She had a low grade fever to 100.6 earlier today. Currently tachycardic to the 100s but HDS. Her CBC is notable for WBC 40 (stable from prior) and H/H 8.6/25.5. her CMP shows a 2 bili of 2.2 but is otherwise unremarkable. Lactic acid is normal at 1.3. She had a repeat CT A/P today which showed dilated bowel consistent with an ileus. However, per her family and the primary team, her pain seems out of proportion to the findings.     Post-Op Info:  Procedure(s) (LRB):  LAPAROSCOPY, DIAGNOSTIC (N/A)  LAPAROTOMY, EXPLORATORY (N/A)  EXCISION, SMALL INTESTINE (N/A)  REPAIR, HERNIA, INGUINAL (Bilateral)   13 Days Post-Op     Interval History: No acute events. Tolerating diet without nausea or vomiting. Passing gas and having bowel  movements without issues.     Medications:  Continuous Infusions:  Scheduled Meds:   allopurinoL  200 mg Oral Daily    amLODIPine  5 mg Oral Daily    colchicine  0.6 mg Oral Daily    cyanocobalamin  1,000 mcg Intramuscular Weekly    diclofenac sodium  2 g Topical (Top) BID    fluticasone furoate-vilanteroL  1 puff Inhalation Daily    pantoprazole  40 mg Oral BID AC    QUEtiapine  50 mg Oral QHS    tiotropium bromide  2 puff Inhalation Daily     PRN Meds:  Current Facility-Administered Medications:     sodium chloride 0.9%, , Intravenous, PRN    acetaminophen, 650 mg, Oral, Q6H PRN    albuterol-ipratropium, 3 mL, Nebulization, Q4H PRN    aluminum & magnesium hydroxide-simethicone, 30 mL, Oral, Q6H PRN    LIDOcaine, , Topical (Top), BID PRN    melatonin, 6 mg, Oral, Nightly PRN    ondansetron, 4 mg, Intravenous, Q4H PRN    sodium chloride, 1 spray, Each Nostril, PRN    sodium chloride 0.9%, 10 mL, Intravenous, PRN     Review of patient's allergies indicates:  No Known Allergies  Objective:     Vital Signs (Most Recent):  Temp: 98.5 °F (36.9 °C) (05/30/24 0820)  Pulse: 81 (05/30/24 0820)  Resp: 16 (05/30/24 0820)  BP: (!) 140/78 (05/30/24 0820)  SpO2: 95 % (05/30/24 0820) Vital Signs (24h Range):  Temp:  [97.8 °F (36.6 °C)-98.5 °F (36.9 °C)] 98.5 °F (36.9 °C)  Pulse:  [] 81  Resp:  [16-21] 16  SpO2:  [95 %-98 %] 95 %  BP: (116-173)/(61-80) 140/78     Weight: 52.6 kg (115 lb 15.4 oz)  Body mass index is 20.54 kg/m².    Intake/Output - Last 3 Shifts         05/28 0700 05/29 0659 05/29 0700 05/30 0659 05/30 0700 05/31 0659    P.O. 600      I.V. (mL/kg)       IV Piggyback       Total Intake(mL/kg) 600 (11.4)      Urine (mL/kg/hr) 900 (0.7)      Total Output 900      Net -300                      Physical Exam  Vitals reviewed.   HENT:      Head: Normocephalic and atraumatic.   Eyes:      Extraocular Movements: Extraocular movements intact.      Conjunctiva/sclera: Conjunctivae normal.   Cardiovascular:      Rate  and Rhythm: Normal rate.   Pulmonary:      Effort: Pulmonary effort is normal. No respiratory distress.   Abdominal:      General: There is no distension.      Palpations: Abdomen is soft.      Tenderness: There is no abdominal tenderness.      Comments: Lower abdominal incision with staples in place, no drainage. Healing well.   Musculoskeletal:         General: No swelling. Normal range of motion.      Cervical back: Normal range of motion.   Skin:     General: Skin is warm and dry.   Neurological:      Mental Status: She is alert.          Significant Labs:  I have reviewed all pertinent lab results within the past 24 hours.  CBC:   Recent Labs   Lab 05/28/24  0237   WBC 7.08  7.13   RBC 2.63*  2.75*   HGB 8.0*  8.1*   HCT 25.4*  26.3*   *  539*   MCV 97  96   MCH 30.4  29.5   MCHC 31.5*  30.8*     CMP:   Recent Labs   Lab 05/30/24  0309   GLU 83   CALCIUM 7.6*   ALBUMIN 1.8*   PROT 5.1*      K 2.9*   CO2 21*      BUN 3*   CREATININE 0.7   ALKPHOS 75   ALT 23   AST 26   BILITOT 0.3       Significant Diagnostics:  I have reviewed all pertinent imaging results/findings within the past 24 hours.  Assessment/Plan:     * Acute lower GI bleeding  Jessica Floyd is a 73 y.o. female who presented with a GIB s/p IR embolization of a tertiary branch of the ileal artery now with severe LLQ pain. She is now s/p diagnostic laparotomy converted to ex lap with small bowel resection and primary tissue repair of bilateral inguinal hernias 5/17. Previously with ROBF and tolerating diet, now with emesis and dilated bowel concerning for ileus s/p NGT decompression.     - Regular diet as tolerated  - Labs per primary, has been stable   - Aspiration precautions  - Replete lytes PRN   - K>4, Mag>2, Phos>3  - Abx per primary  - Okay for PT/OT. Would recommend early mobilization as tolerated  - Remainder of care per primary team    Please call General Surgery prior to patient leaving so that we may remove her  staples.           Niyah Figueroa MD  General Surgery  [facility] ([place])

## 2024-05-31 LAB
ALBUMIN SERPL BCP-MCNC: 1.8 G/DL (ref 3.5–5.2)
ALP SERPL-CCNC: 77 U/L (ref 55–135)
ALT SERPL W/O P-5'-P-CCNC: 22 U/L (ref 10–44)
ANION GAP SERPL CALC-SCNC: 7 MMOL/L (ref 8–16)
ANISOCYTOSIS BLD QL SMEAR: SLIGHT
AST SERPL-CCNC: 24 U/L (ref 10–40)
BASOPHILS # BLD AUTO: 0.28 K/UL (ref 0–0.2)
BASOPHILS NFR BLD: 2.7 % (ref 0–1.9)
BILIRUB SERPL-MCNC: 0.3 MG/DL (ref 0.1–1)
BUN SERPL-MCNC: 4 MG/DL (ref 8–23)
BURR CELLS BLD QL SMEAR: ABNORMAL
CALCIUM SERPL-MCNC: 7.6 MG/DL (ref 8.7–10.5)
CHLORIDE SERPL-SCNC: 110 MMOL/L (ref 95–110)
CO2 SERPL-SCNC: 19 MMOL/L (ref 23–29)
CREAT SERPL-MCNC: 0.7 MG/DL (ref 0.5–1.4)
DACRYOCYTES BLD QL SMEAR: ABNORMAL
DIFFERENTIAL METHOD BLD: ABNORMAL
DOHLE BOD BLD QL SMEAR: PRESENT
EOSINOPHIL # BLD AUTO: 0.6 K/UL (ref 0–0.5)
EOSINOPHIL NFR BLD: 6.2 % (ref 0–8)
ERYTHROCYTE [DISTWIDTH] IN BLOOD BY AUTOMATED COUNT: 19.1 % (ref 11.5–14.5)
EST. GFR  (NO RACE VARIABLE): >60 ML/MIN/1.73 M^2
GIANT PLATELETS BLD QL SMEAR: PRESENT
GLUCOSE SERPL-MCNC: 75 MG/DL (ref 70–110)
HCT VFR BLD AUTO: 32.4 % (ref 37–48.5)
HGB BLD-MCNC: 9.8 G/DL (ref 12–16)
HYPOCHROMIA BLD QL SMEAR: ABNORMAL
IMM GRANULOCYTES # BLD AUTO: 0.12 K/UL (ref 0–0.04)
IMM GRANULOCYTES NFR BLD AUTO: 1.2 % (ref 0–0.5)
LYMPHOCYTES # BLD AUTO: 1.9 K/UL (ref 1–4.8)
LYMPHOCYTES NFR BLD: 18.3 % (ref 18–48)
MAGNESIUM SERPL-MCNC: 1.7 MG/DL (ref 1.6–2.6)
MCH RBC QN AUTO: 30 PG (ref 27–31)
MCHC RBC AUTO-ENTMCNC: 30.2 G/DL (ref 32–36)
MCV RBC AUTO: 99 FL (ref 82–98)
MONOCYTES # BLD AUTO: 2.5 K/UL (ref 0.3–1)
MONOCYTES NFR BLD: 24.2 % (ref 4–15)
NEUTROPHILS # BLD AUTO: 4.9 K/UL (ref 1.8–7.7)
NEUTROPHILS NFR BLD: 47.4 % (ref 38–73)
NRBC BLD-RTO: 0 /100 WBC
OVALOCYTES BLD QL SMEAR: ABNORMAL
PHOSPHATE SERPL-MCNC: 2.7 MG/DL (ref 2.7–4.5)
PLATELET # BLD AUTO: 782 K/UL (ref 150–450)
PLATELET BLD QL SMEAR: ABNORMAL
PMV BLD AUTO: 9.8 FL (ref 9.2–12.9)
POIKILOCYTOSIS BLD QL SMEAR: SLIGHT
POLYCHROMASIA BLD QL SMEAR: ABNORMAL
POTASSIUM SERPL-SCNC: 4.2 MMOL/L (ref 3.5–5.1)
PROT SERPL-MCNC: 5.2 G/DL (ref 6–8.4)
RBC # BLD AUTO: 3.27 M/UL (ref 4–5.4)
SCHISTOCYTES BLD QL SMEAR: ABNORMAL
SCHISTOCYTES BLD QL SMEAR: PRESENT
SODIUM SERPL-SCNC: 136 MMOL/L (ref 136–145)
SPHEROCYTES BLD QL SMEAR: ABNORMAL
TOXIC GRANULES BLD QL SMEAR: PRESENT
WBC # BLD AUTO: 10.29 K/UL (ref 3.9–12.7)
WBC TOXIC VACUOLES BLD QL SMEAR: PRESENT

## 2024-05-31 PROCEDURE — 94664 DEMO&/EVAL PT USE INHALER: CPT | Mod: NTX

## 2024-05-31 PROCEDURE — 25000003 PHARM REV CODE 250: Mod: NTX

## 2024-05-31 PROCEDURE — 97116 GAIT TRAINING THERAPY: CPT | Mod: NTX,CQ

## 2024-05-31 PROCEDURE — 83735 ASSAY OF MAGNESIUM: CPT | Mod: NTX

## 2024-05-31 PROCEDURE — 97530 THERAPEUTIC ACTIVITIES: CPT | Mod: NTX

## 2024-05-31 PROCEDURE — 25000003 PHARM REV CODE 250: Mod: NTX | Performed by: INTERNAL MEDICINE

## 2024-05-31 PROCEDURE — 84100 ASSAY OF PHOSPHORUS: CPT | Mod: NTX

## 2024-05-31 PROCEDURE — 85025 COMPLETE CBC W/AUTO DIFF WBC: CPT | Mod: NTX | Performed by: STUDENT IN AN ORGANIZED HEALTH CARE EDUCATION/TRAINING PROGRAM

## 2024-05-31 PROCEDURE — 80053 COMPREHEN METABOLIC PANEL: CPT | Mod: NTX

## 2024-05-31 PROCEDURE — 36415 COLL VENOUS BLD VENIPUNCTURE: CPT | Mod: NTX

## 2024-05-31 PROCEDURE — 25000003 PHARM REV CODE 250: Mod: NTX | Performed by: STUDENT IN AN ORGANIZED HEALTH CARE EDUCATION/TRAINING PROGRAM

## 2024-05-31 PROCEDURE — 25000003 PHARM REV CODE 250: Mod: NTX | Performed by: HOSPITALIST

## 2024-05-31 PROCEDURE — 36415 COLL VENOUS BLD VENIPUNCTURE: CPT | Mod: NTX | Performed by: STUDENT IN AN ORGANIZED HEALTH CARE EDUCATION/TRAINING PROGRAM

## 2024-05-31 PROCEDURE — 20600001 HC STEP DOWN PRIVATE ROOM: Mod: NTX

## 2024-05-31 PROCEDURE — 97110 THERAPEUTIC EXERCISES: CPT | Mod: NTX,CQ

## 2024-05-31 PROCEDURE — 99900035 HC TECH TIME PER 15 MIN (STAT): Mod: NTX

## 2024-05-31 PROCEDURE — 94660 CPAP INITIATION&MGMT: CPT | Mod: NTX

## 2024-05-31 PROCEDURE — 94799 UNLISTED PULMONARY SVC/PX: CPT | Mod: NTX

## 2024-05-31 PROCEDURE — 94761 N-INVAS EAR/PLS OXIMETRY MLT: CPT | Mod: NTX

## 2024-05-31 RX ORDER — FUROSEMIDE 20 MG/1
20 TABLET ORAL ONCE
Status: COMPLETED | OUTPATIENT
Start: 2024-05-31 | End: 2024-05-31

## 2024-05-31 RX ORDER — LORAZEPAM 0.5 MG/1
0.5 TABLET ORAL ONCE
Status: COMPLETED | OUTPATIENT
Start: 2024-05-31 | End: 2024-05-31

## 2024-05-31 RX ADMIN — AMLODIPINE BESYLATE 5 MG: 5 TABLET ORAL at 08:05

## 2024-05-31 RX ADMIN — LORAZEPAM 0.5 MG: 0.5 TABLET ORAL at 08:05

## 2024-05-31 RX ADMIN — PANTOPRAZOLE SODIUM 40 MG: 40 TABLET, DELAYED RELEASE ORAL at 04:05

## 2024-05-31 RX ADMIN — ACETAMINOPHEN 650 MG: 650 SOLUTION ORAL at 05:05

## 2024-05-31 RX ADMIN — QUETIAPINE FUMARATE 50 MG: 25 TABLET ORAL at 08:05

## 2024-05-31 RX ADMIN — DICLOFENAC SODIUM 2 G: 10 GEL TOPICAL at 08:05

## 2024-05-31 RX ADMIN — FUROSEMIDE 20 MG: 20 TABLET ORAL at 09:05

## 2024-05-31 RX ADMIN — ATORVASTATIN CALCIUM 80 MG: 40 TABLET, FILM COATED ORAL at 08:05

## 2024-05-31 RX ADMIN — PANTOPRAZOLE SODIUM 40 MG: 40 TABLET, DELAYED RELEASE ORAL at 06:05

## 2024-05-31 RX ADMIN — ALLOPURINOL 200 MG: 100 TABLET ORAL at 08:05

## 2024-05-31 RX ADMIN — FLUTICASONE FUROATE AND VILANTEROL TRIFENATATE 1 PUFF: 100; 25 POWDER RESPIRATORY (INHALATION) at 08:05

## 2024-05-31 RX ADMIN — TIOTROPIUM BROMIDE INHALATION SPRAY 2 PUFF: 3.12 SPRAY, METERED RESPIRATORY (INHALATION) at 08:05

## 2024-05-31 RX ADMIN — Medication 6 MG: at 08:05

## 2024-05-31 RX ADMIN — COLCHICINE 0.6 MG: 0.6 TABLET, FILM COATED ORAL at 08:05

## 2024-05-31 NOTE — PLAN OF CARE
Pt rested well overnight with  at bedside. PRN tylenol given for left leg and foot pain. No further complaints or PRNs given. No signs of distress, vital signs WNL. Pt able to change positions in bed independently. POC reviewed and updated with family at beginning of shift, all questions answered. Bed in locked in low position, all personal items within reach,call light within reach.

## 2024-05-31 NOTE — CARE UPDATE
General Surgery    Patient is now 2 weeks s/p ex lap with small bowel resection. Tolerating diet and having bowel function. Awaiting SNF placement  - Staples removed  - Can f/u in clinic on an as needed basis  - Please call with questions or concerns

## 2024-05-31 NOTE — PT/OT/SLP PROGRESS
"Physical Therapy Treatment    Patient Name:  Jessica Floyd   MRN:  [id number]    Recommendations:     Discharge Recommendations: High Intensity Therapy  Discharge Equipment Recommendations: none  Barriers to discharge: None    Assessment:     Jessica Floyd is a 73 y.o. female admitted with a medical diagnosis of Acute lower GI bleeding.  She presents with the following impairments/functional limitations: weakness, impaired endurance, impaired self care skills, impaired functional mobility, gait instability, decreased safety awareness, impaired cardiopulmonary response to activity, decreased lower extremity function.    Pt agreeable to session at this time. Pt tolerates session fairly with emphasis on bed mobility, transfers, and gait. Pt perseverating on anxiety disorder throughout session and adamant that she needs two people for transfers and gait. VSS stable throughout session. Pt becomes anxious during gait trial. PTA attempted to educate pt on deep breathing techniques but pt not open to education stating "I know what I have to do to take care of myself". Pt will continue to benefit from skilled therapy services.    Rehab Prognosis: Good; patient would benefit from acute skilled PT services to address these deficits and reach maximum level of function.    Recent Surgery: Procedure(s) (LRB):  LAPAROSCOPY, DIAGNOSTIC (N/A)  LAPAROTOMY, EXPLORATORY (N/A)  EXCISION, SMALL INTESTINE (N/A)  REPAIR, HERNIA, INGUINAL (Bilateral) 14 Days Post-Op    Plan:     During this hospitalization, patient to be seen 4 x/week to address the identified rehab impairments via gait training, therapeutic activities, therapeutic exercises and progress toward the following goals:    Plan of Care Expires:  06/23/24    Subjective     Chief Complaint: "there needs to be two people"  Patient/Family Comments/goals: "I only trust my "   Pain/Comfort:  Pain Rating 1: 7/10  Location - Orientation 1: generalized  Location 1:  " "("everywhere")  Pain Addressed 1: Reposition, Distraction  Pain Rating Post-Intervention 1: other (see comments) (no further mention of pain)      Objective:     Communicated with RN (fernando) prior to session.  Patient found HOB elevated with telemetry, pulse ox (continuous) upon PTA entry to room.     General Precautions: Standard, fall  Orthopedic Precautions: N/A  Braces: N/A  Respiratory Status: Room air     Functional Mobility:  Bed Mobility:     Supine to Sit: stand by assistance  Transfers:     Sit to Stand:  contact guard assistance with rolling walker  Gait: Pt ambulates ~3 feet to bedside chair CGA with RW. Standing rest break required during trial 2/2 to pt becoming anxious. Further mobility deferred 2/2 to pt anxiety.      AM-PAC 6 CLICK MOBILITY  Turning over in bed (including adjusting bedclothes, sheets and blankets)?: 3  Sitting down on and standing up from a chair with arms (e.g., wheelchair, bedside commode, etc.): 3  Moving from lying on back to sitting on the side of the bed?: 3  Moving to and from a bed to a chair (including a wheelchair)?: 3  Need to walk in hospital room?: 3  Climbing 3-5 steps with a railing?: 1  Basic Mobility Total Score: 16       Treatment & Education:  Patient provided with daily orientation and goals of this PT session.     Attempted education on deep breathing techniques for anxiety and the role of PT and OT in the acute care setting and the importance of being able to tolerate participating with one therapist at a time. (Pt perseverating on having two person assist, pt current functional level does not warrant co treat or use of tech). Pt not open to education stating "I know what I need to do for myself, this is not my first rodeo".    Pt educated to call for assistance and to transfer with hospital staff only.  Also, pt was educated on the effects of prolonged immobility and the importance of performing OOB activity and exercises to promote healing and reduce " recovery time.    Patient left up in chair with all lines intact, call button in reach, and RN notified.    GOALS:   Multidisciplinary Problems       Physical Therapy Goals          Problem: Physical Therapy    Goal Priority Disciplines Outcome Goal Variances Interventions   Physical Therapy Goal     PT, PT/OT Progressing     Description: Goals to be met by: 2024    Patient will increase functional independence with mobility by performin. Supine to sit with Minimal Assistance -met   2. Sit to stand transfer with modified Independent using RW  3. Gait  x 50 feet with Contact Guard Assistance using Rolling Walker.   4. Ascend/descend 3 stair with bilateral Handrails Contact Guard Assistance using RW.   5. Pt perform AROM there exer LLE x 15 reps to increase strength for increased mobility.                          Time Tracking:     PT Received On: 24  PT Start Time: 0953     PT Stop Time: 1016  PT Total Time (min): 23 min     Billable Minutes: Gait Training 15 and Therapeutic Activity 8    Treatment Type: Treatment  PT/PTA: PTA     Number of PTA visits since last PT visit: 2     2024

## 2024-05-31 NOTE — PT/OT/SLP PROGRESS
Physical Therapy Treatment    Patient Name:  Jessica Floyd   MRN:  [id number]    Recommendations:     Discharge Recommendations: High Intensity Therapy  Discharge Equipment Recommendations: none  Barriers to discharge: None    Assessment:     Jessica Floyd is a 73 y.o. female admitted with a medical diagnosis of Acute lower GI bleeding.  She presents with the following impairments/functional limitations: weakness, impaired endurance, gait instability, pain, decreased lower extremity function, decreased ROM, impaired cardiopulmonary response to activity Pt tolerated treatment session well today. Pt required little assistance for t/f's and gait training. Pt was able to increase distance ambulated. Pt also tolerated LE exercises well. Pt POC met 5/30, writing PTA spoke with PT (Olga Steiner) regarding seeing the pt over POC. Patient remains appropriate for continued skilled services within the acute environment and goals remain appropriate.   .    Rehab Prognosis: Good; patient would benefit from acute skilled PT services to address these deficits and reach maximum level of function.    Recent Surgery: Procedure(s) (LRB):  LAPAROSCOPY, DIAGNOSTIC (N/A)  LAPAROTOMY, EXPLORATORY (N/A)  EXCISION, SMALL INTESTINE (N/A)  REPAIR, HERNIA, INGUINAL (Bilateral) 14 Days Post-Op    Plan:     During this hospitalization, patient to be seen 4 x/week to address the identified rehab impairments via gait training, therapeutic activities, therapeutic exercises and progress toward the following goals:    Plan of Care Expires:  06/23/24    Subjective     Chief Complaint: L leg pain   Patient/Family Comments/goals: Pt agreeable to PT   Pain/Comfort:  Pain Rating 1:  (not rated)  Location - Side 1: Left  Location - Orientation 1: generalized  Location 1: leg  Pain Addressed 1: Reposition, Distraction  Pain Rating Post-Intervention 1:  (not rated)      Objective:     Communicated with RN prior to session.  Patient found up in chair with  telemetry, pulse ox (continuous), blood pressure cuff upon PT entry to room.     General Precautions: Standard, fall  Orthopedic Precautions: N/A  Braces: N/A  Respiratory Status: Room air     Functional Mobility:  Transfers:     Sit to Stand x 2:  contact guard assistance with rolling walker  Gait belt utilized  Gait: 30 ft + 45 ft CGA with RW   Pt ambulated with kyphotic posture, slow aliza, decreased step length and narrow ASHLEY     Pt performed 10 repetitions of seated B LE exercises consisting of: Marching, LAQ, ABD/ADD, heel raises, and toe raises.   B hamstring stretch 2 sets of 30 second holds        AM-PAC 6 CLICK MOBILITY  Turning over in bed (including adjusting bedclothes, sheets and blankets)?: 3  Sitting down on and standing up from a chair with arms (e.g., wheelchair, bedside commode, etc.): 3  Moving from lying on back to sitting on the side of the bed?: 3  Moving to and from a bed to a chair (including a wheelchair)?: 3  Need to walk in hospital room?: 3  Climbing 3-5 steps with a railing?: 2  Basic Mobility Total Score: 17       Treatment & Education:  Therapist provided instruction and educated for safety during transfers and gait training. As well as proper body mechanics, energy conservation, and fall prevention strategies during tasks listed above, and the effects of prolonged immobility and the importance of performing EOB/OOB activity and exercises to promote healing and reduce recovery time.       Patient left up in chair with all lines intact, call button in reach, Rn notified, and family present..    GOALS:   Multidisciplinary Problems       Physical Therapy Goals          Problem: Physical Therapy    Goal Priority Disciplines Outcome Goal Variances Interventions   Physical Therapy Goal     PT, PT/OT Progressing     Description: Goals to be met by: 2024    Patient will increase functional independence with mobility by performin. Supine to sit with Minimal Assistance -met  5/27  2. Sit to stand transfer with modified Independent using RW  3. Gait  x 50 feet with Contact Guard Assistance using Rolling Walker.   4. Ascend/descend 3 stair with bilateral Handrails Contact Guard Assistance using RW.   5. Pt perform AROM there exer LLE x 15 reps to increase strength for increased mobility.                          Time Tracking:     PT Received On: 05/31/24  PT Start Time: 1153     PT Stop Time: 1227  PT Total Time (min): 34 min     Billable Minutes: Gait Training 20 and Therapeutic Exercise 14    Treatment Type: Treatment  PT/PTA: PTA     Number of PTA visits since last PT visit: 3     05/31/2024

## 2024-05-31 NOTE — PROGRESS NOTES
"[facility] - Stepdown Flex ([place] [id number])  [facility] Medicine  Progress Note    Patient Name: Jessica Floyd  MRN: [id number]  Patient Class: IP- Inpatient   Admission Date: 5/14/2024  Length of Stay: 17 days  Attending Physician: Lexy Olivares MD  Primary Care Provider: Effie Olmedo MD        Subjective:     Principal Problem:Acute lower GI bleeding        HPI:  Mrs. Floyd is a 73 year old female with PMH notable for COPD on home 2L NC, pulm HTN, R carotid stent on ASA, HFpEF, anemia, and ERIK on infusions who presented to [facility] ED with the melena.  is at bedside and reports that the patient had an episode of diarrhea and lethargy yesterday. She reports that it felt as if "she was going to pass out", so she was using her walker to get around. Patient's  reports that this has never happened before. Patient denies excessive OTC NSAID use. She reports that she drinks 2 wine glasses a day. This morning when the patient woke up she felt very lethargic. Patient went to use the bathroom, and  noted bright red stool in the bowl. Patient reported a pre syncopal episode and felt lightheaded. She admits to weakness, SOB, light-headedness, hematochezia, and pre syncope. Patient denies nausea, vomiting, hematemesis, falls, confusion, chest pain, urinary changes, and fevers. Patient reports taking aspirin daily but no DOAC.      Hemoglobin 5.9 at presentation to ED (baseline ~10). GI and IR consulted given concern for active bleed seen on CTA. In the emergency department she was given 1L IVF and a 80 mg IV protonix bolus. Critical care medicine. consulted for GIB. CTA in the Ed revealed "Acute gastrointestinal bleed at the level of the cecum". IR planning for embolization today. Patient is also receiving 2 pRBC due to acute anemia.     Overview/Hospital Course:  73yoF w/PMHx notable for COPD, pulm HTN, R carotid stent on ASA, HFpEF, anemia, and ERIK on infusions who presented to [facility] ED with melena. Determined " to have an active bleed at the level of the cecum s/p embolization with IR. Post operatively she was noted to have increasing abdominal pain and underwent ex lap with general surgery for incarcerated inguinal hernia on 5/17. She has been stable postoperatively. She was requiring intermittent precedex for severe delirium/agitation but has now been off precedex for 2 days and is stable for stepdown. Hgb stable, tolerating regular diet.      Deemed appropriate for transfer to the floor on 5/22/2024, and was accepted to  team Z for further care and management.  On 5/23/24 patient had significant clinical decline, she had bowel obstruction, respiratory failure and fall in hemoglobin secondary to GI bleed.  She was transferred to ICU NG tube placed and general surgery consulted.  During stay in ICU, patient had improvement, NG tube was removed, no further reports of bleed and hemoglobin stable, she was accepted to [facility] and stepped down to medicine again.  Insurance denied acute rehab but is okay with skilled nursing facility. Awaiting SNF placement.     Interval History: No complaints this am- regular Bms. No blood in stools. No nausea, vomiting, fevers, chills, night sweats. Still w/weakness- both myself and  had to assist patient to bathroom (while she used her walker)- she was unable to stand independently     Objective:     Vital Signs (Most Recent):  Temp: 97.9 °F (36.6 °C) (05/31/24 1158)  Pulse: 97 (05/31/24 1158)  Resp: 20 (05/31/24 1158)  BP: 134/74 (05/31/24 1158)  SpO2: 98 % (05/31/24 1158) Vital Signs (24h Range):  Temp:  [97.6 °F (36.4 °C)-98.1 °F (36.7 °C)] 97.9 °F (36.6 °C)  Pulse:  [] 97  Resp:  [14-35] 20  SpO2:  [95 %-100 %] 98 %  BP: (110-155)/(59-87) 134/74     Weight: 52.6 kg (115 lb 15.4 oz)  Body mass index is 20.54 kg/m².    Intake/Output Summary (Last 24 hours) at 5/31/2024 1411  Last data filed at 5/31/2024 0459  Gross per 24 hour   Intake --   Output 400 ml   Net -400 ml  "     Physical Exam  Gen: in NAD, appears stated age  Neuro: AAOx3, motor, sensory, and strength grossly intact BL  HEENT: NTNC, EOMI, PERRL, MMM  CVS: RRR, no m/r/g; S1/S2 auscultated with no S3 or S4; capillary refill < 2 sec  Resp: lungs CTAB, no w/r/r; no belabored breathing or accessory muscle use appreciated   Abd: s/p staple removal of the abdomen- vertical orientation, BS+ in all 4 quadrants; NTND, soft to palpation; no organomegaly appreciated   Extrem: pulses full, equal, and regular over all 4 extremities; no UE or LE edema BL    Significant Labs:  CBC:  No results for input(s): "WBC", "HGB", "HCT", "PLT" in the last 48 hours.  CMP:  Recent Labs   Lab 05/30/24  0309 05/31/24  0432    136   K 2.9* 4.2    110   CO2 21* 19*   GLU 83 75   BUN 3* 4*   CREATININE 0.7 0.7   CALCIUM 7.6* 7.6*   PROT 5.1* 5.2*   ALBUMIN 1.8* 1.8*   BILITOT 0.3 0.3   ALKPHOS 75 77   AST 26 24   ALT 23 22   ANIONGAP 7* 7*         Assessment/Plan:      * Acute lower GI bleeding  - ICU stepdown for GIB  - s/p IR embolization and ex-lap via gen surg  - briefly back on floor, but back to ICU due to ileus and bleed- upon improvement in condition, stepped back down to floor  - stable H:H  - PPI BID  - Gen surg planning for staple removal 05/31  - Tolerating regular diet     Ischemia, bowel  - s/p ex lap, gen surg following, staple removal tomorrow, advanced to regular diet today     (HFpEF) heart failure with preserved ejection fraction  - Interval history and physical exam findings as described above  - Most recent TTE results:  Results for orders placed during the hospital encounter of 10/28/23    Echo    Interpretation Summary    Left Ventricle: The left ventricle is normal in size. Normal wall thickness. Normal wall motion. There is normal systolic function with a visually estimated ejection fraction of 60 - 65%. There is normal diastolic function.    Right Ventricle: Mild right ventricular enlargement. Wall thickness is " normal. Right ventricle wall motion  is normal. Systolic function is normal.    Aortic Valve: The aortic valve is a unicuspid valve. There is mild aortic valve sclerosis. There is trace aortic regurgitation.    Mitral Valve: There is mild regurgitation.    Pulmonary Artery: There is mild pulmonary hypertension. The estimated pulmonary artery systolic pressure is 40 mmHg.    IVC/SVC: Intermediate venous pressure at 8 mmHg.    - Clinically euvolemic  - Continue home cardioprudent regimen  - Strict I/Os  - 1.5L fluid restriction   - Daily weights  - Will continue to monitor on tele    Pulmonary emphysema  - On home oxygen.  Continue with nebs p.r.n. and home inhaler    Acute hypoxic on chronic hypercapnic respiratory failure  - 2/2 COPD  - currently on home oxygen     Gout  - continue allopurinol    Impaired mobility  - awaiting SNF placement- very weak and debilitated    Acute blood loss anemia  - See #1    Leukocytosis  - Resolved. Completed course of IV Zosyn      Hypertension  - BP currently well-controlled  - Continue home regimen of amlodipine 5mg qday   - Will continue to monitor and further titrate antihypertensives as clinically indicated       Hypokalemia  Patient has hypokalemia which is Acute and currently controlled. Most recent potassium levels reviewed-   Lab Results   Component Value Date    K 4.2 05/31/2024   . Will continue potassium replacement per protocol and recheck repeat levels after replacement completed.     Bilateral carotid artery stenosis  - continue statin        VTE Risk Mitigation (From admission, onward)           Ordered     Place sequential compression device  Until discontinued         05/23/24 0826     IP VTE HIGH RISK PATIENT  Once         05/15/24 0104     Place sequential compression device  Until discontinued         05/15/24 0104                    Discharge Planning   DEBBIE: 6/3/2024     Code Status: Full Code   Is the patient medically ready for discharge?:     Reason for  patient still in hospital (select all that apply): Patient trending condition  Discharge Plan A: Skilled Nursing Facility   Discharge Delays: (!) Payor Issues              Lexy Olivares MD  Department of Hospital Medicine   [facility] - [facility]down Flex ([id number])

## 2024-05-31 NOTE — ASSESSMENT & PLAN NOTE
- Interval history and physical exam findings as described above  - Most recent TTE results:  Results for orders placed during the hospital encounter of 10/28/23    Echo    Interpretation Summary    Left Ventricle: The left ventricle is normal in size. Normal wall thickness. Normal wall motion. There is normal systolic function with a visually estimated ejection fraction of 60 - 65%. There is normal diastolic function.    Right Ventricle: Mild right ventricular enlargement. Wall thickness is normal. Right ventricle wall motion  is normal. Systolic function is normal.    Aortic Valve: The aortic valve is a unicuspid valve. There is mild aortic valve sclerosis. There is trace aortic regurgitation.    Mitral Valve: There is mild regurgitation.    Pulmonary Artery: There is mild pulmonary hypertension. The estimated pulmonary artery systolic pressure is 40 mmHg.    IVC/SVC: Intermediate venous pressure at 8 mmHg.    - Clinically euvolemic  - Continue home cardioprudent regimen  - Strict I/Os  - 1.5L fluid restriction   - Daily weights  - Will continue to monitor on tele

## 2024-05-31 NOTE — ASSESSMENT & PLAN NOTE
Patient has hypokalemia which is Acute and currently controlled. Most recent potassium levels reviewed-   Lab Results   Component Value Date    K 4.2 05/31/2024   . Will continue potassium replacement per protocol and recheck repeat levels after replacement completed.

## 2024-05-31 NOTE — SUBJECTIVE & OBJECTIVE
"Interval History: No complaints this am- regular Bms. No blood in stools. No nausea, vomiting, fevers, chills, night sweats. Still w/weakness- both myself and  had to assist patient to bathroom (while she used her walker)- she was unable to stand independently     Objective:     Vital Signs (Most Recent):  Temp: 97.9 °F (36.6 °C) (05/31/24 1158)  Pulse: 97 (05/31/24 1158)  Resp: 20 (05/31/24 1158)  BP: 134/74 (05/31/24 1158)  SpO2: 98 % (05/31/24 1158) Vital Signs (24h Range):  Temp:  [97.6 °F (36.4 °C)-98.1 °F (36.7 °C)] 97.9 °F (36.6 °C)  Pulse:  [] 97  Resp:  [14-35] 20  SpO2:  [95 %-100 %] 98 %  BP: (110-155)/(59-87) 134/74     Weight: 52.6 kg (115 lb 15.4 oz)  Body mass index is 20.54 kg/m².    Intake/Output Summary (Last 24 hours) at 5/31/2024 1411  Last data filed at 5/31/2024 0459  Gross per 24 hour   Intake --   Output 400 ml   Net -400 ml      Physical Exam  Gen: in NAD, appears stated age  Neuro: AAOx3, motor, sensory, and strength grossly intact BL  HEENT: NTNC, EOMI, PERRL, MMM  CVS: RRR, no m/r/g; S1/S2 auscultated with no S3 or S4; capillary refill < 2 sec  Resp: lungs CTAB, no w/r/r; no belabored breathing or accessory muscle use appreciated   Abd: s/p staple removal of the abdomen- vertical orientation, BS+ in all 4 quadrants; NTND, soft to palpation; no organomegaly appreciated   Extrem: pulses full, equal, and regular over all 4 extremities; no UE or LE edema BL    Significant Labs:  CBC:  No results for input(s): "WBC", "HGB", "HCT", "PLT" in the last 48 hours.  CMP:  Recent Labs   Lab 05/30/24  0309 05/31/24  0432    136   K 2.9* 4.2    110   CO2 21* 19*   GLU 83 75   BUN 3* 4*   CREATININE 0.7 0.7   CALCIUM 7.6* 7.6*   PROT 5.1* 5.2*   ALBUMIN 1.8* 1.8*   BILITOT 0.3 0.3   ALKPHOS 75 77   AST 26 24   ALT 23 22   ANIONGAP 7* 7*       "

## 2024-05-31 NOTE — PLAN OF CARE
Patient aaox4. Patient placed on iv magnesium once, oral atorvastatin and oral potasium. Patient complains of anxiety and ativan ordered and given once.. Patient worked with OT/PT today. Patient complains of pain and her prn tylenol given. Plan of care reviewed with patient and patient verbalized understanding. No other concerns at this time.             Problem: Adult Inpatient Plan of Care  Goal: Plan of Care Review  Outcome: Progressing  Goal: Patient-Specific Goal (Individualized)  Outcome: Progressing  Goal: Absence of Hospital-Acquired Illness or Injury  Outcome: Progressing  Goal: Optimal Comfort and Wellbeing  Outcome: Progressing  Goal: Readiness for Transition of Care  Outcome: Progressing

## 2024-05-31 NOTE — PT/OT/SLP PROGRESS
Occupational Therapy   Treatment    Name: Jessica Floyd  MRN: [id number]  Admitting Diagnosis:  Acute lower GI bleeding  14 Days Post-Op    Recommendations:     Discharge Recommendations: Moderate Intensity Therapy  Discharge Equipment Recommendations:  none  Barriers to discharge:  None    Assessment:     Jessica Floyd is a 73 y.o. female with a medical diagnosis of Acute lower GI bleeding.  She presents with fair motivation/participation for therapeutic interventions. Due to pt's continued low participation/motivation for skilled occupational therapy interventions as well as functional progression, pt's DC recommendations have been adjusted to moderate intensity. Pt currently presents with appropriate safety awareness and DME usage for optimal occupational participation, however has decreased activity tolerance needed to engage in preferred occupations warranting the need for continued OT services upon discharge.     Performance deficits affecting function are weakness, impaired endurance, impaired self care skills, impaired functional mobility, gait instability, impaired balance, decreased lower extremity function.     Rehab Prognosis:  Good; patient would benefit from acute skilled OT services to address these deficits and reach maximum level of function.       Plan:     Patient to be seen 4 x/week to address the above listed problems via self-care/home management, therapeutic activities, therapeutic exercises  Plan of Care Expires: 06/19/24  Plan of Care Reviewed with: patient, spouse, daughter    Subjective     Chief Complaint: wanting to discharge  Patient/Family Comments/goals: return to PLOF  Pain/Comfort:  Pain Rating 1: 0/10  Pain Rating Post-Intervention 1: 0/10    Objective:     Communicated with: RN prior to session.  Patient found up in chair with telemetry, pulse ox (continuous) upon OT entry to room.    General Precautions: Standard, fall    Orthopedic Precautions:N/A  Braces: N/A  Respiratory Status:  Room air     Occupational Performance:     Functional Mobility/Transfers:  Patient completed Sit <> Stand Transfer with minimum assistance  with  rolling walker   Patient completed Toilet Transfer Step Transfer technique with stand by assistance with  rolling walker and grab bars  Functional Mobility: Pt engaging in functional mobility to simulate household/community distances within hospital room and bathroom with SBA and utilizing RW in order to maximize functional activity tolerance and standing balance required for engagement in occupations of choice.     Activities of Daily Living:  Politely declined 2/2 having completed earlier this morning      [facility] 6 Click ADL: 20    Treatment & Education:  Provided education regarding role of OT, POC, & discharge recommendations with pt and family verbalizing understanding.  Pt had no further questions & when asked whether there were any concerns pt reported none.   Pt educated on the benefits of completing OOB ADL/functional mobility tasks with hospital staff present, pt with good understanding.     Patient left up in chair with all lines intact, call button in reach, RN notified, and family present    GOALS:   Multidisciplinary Problems       Occupational Therapy Goals          Problem: Occupational Therapy    Goal Priority Disciplines Outcome Interventions   Occupational Therapy Goal     OT, PT/OT Progressing    Description: Re-eval completed 05-25 Goals to be met by: 6/8/24     Patient will increase functional independence with ADLs by performing:    UE Dressing with Stand-by Assistance.  LE Dressing with Minimal Assistance.  Grooming while standing at sink with stand by Assistance.  Toileting from toilet with Minimal Assistance for hygiene and clothing management.   Toilet transfer to toilet with stand by assist                         Time Tracking:     OT Date of Treatment: 05/31/24  OT Start Time: 1349  OT Stop Time: 1402  OT Total Time (min): 13 min    Billable  Minutes:Therapeutic Activity 13    OT/OCTAVIO: OT     Number of OCTAVIO visits since last OT visit: 0    5/31/2024

## 2024-06-01 PROBLEM — R22.42 LOCALIZED SWELLING OF LEFT LOWER EXTREMITY: Status: ACTIVE | Noted: 2024-06-01

## 2024-06-01 PROBLEM — D75.839 THROMBOCYTOSIS: Status: ACTIVE | Noted: 2024-06-01

## 2024-06-01 LAB
ALBUMIN SERPL BCP-MCNC: 1.9 G/DL (ref 3.5–5.2)
ALP SERPL-CCNC: 90 U/L (ref 55–135)
ALT SERPL W/O P-5'-P-CCNC: 20 U/L (ref 10–44)
ANION GAP SERPL CALC-SCNC: 7 MMOL/L (ref 8–16)
ANISOCYTOSIS BLD QL SMEAR: SLIGHT
AST SERPL-CCNC: 24 U/L (ref 10–40)
BASO STIPL BLD QL SMEAR: ABNORMAL
BASOPHILS # BLD AUTO: 0.3 K/UL (ref 0–0.2)
BASOPHILS NFR BLD: 2.9 % (ref 0–1.9)
BILIRUB SERPL-MCNC: 0.4 MG/DL (ref 0.1–1)
BUN SERPL-MCNC: 7 MG/DL (ref 8–23)
BURR CELLS BLD QL SMEAR: ABNORMAL
CALCIUM SERPL-MCNC: 7.5 MG/DL (ref 8.7–10.5)
CHLORIDE SERPL-SCNC: 109 MMOL/L (ref 95–110)
CO2 SERPL-SCNC: 21 MMOL/L (ref 23–29)
CREAT SERPL-MCNC: 0.7 MG/DL (ref 0.5–1.4)
DIFFERENTIAL METHOD BLD: ABNORMAL
EOSINOPHIL # BLD AUTO: 0.6 K/UL (ref 0–0.5)
EOSINOPHIL NFR BLD: 6.3 % (ref 0–8)
ERYTHROCYTE [DISTWIDTH] IN BLOOD BY AUTOMATED COUNT: 18.9 % (ref 11.5–14.5)
EST. GFR  (NO RACE VARIABLE): >60 ML/MIN/1.73 M^2
GIANT PLATELETS BLD QL SMEAR: PRESENT
GLUCOSE SERPL-MCNC: 79 MG/DL (ref 70–110)
HCT VFR BLD AUTO: 29.3 % (ref 37–48.5)
HGB BLD-MCNC: 9 G/DL (ref 12–16)
HYPOCHROMIA BLD QL SMEAR: ABNORMAL
IMM GRANULOCYTES # BLD AUTO: 0.09 K/UL (ref 0–0.04)
IMM GRANULOCYTES NFR BLD AUTO: 0.9 % (ref 0–0.5)
LYMPHOCYTES # BLD AUTO: 1.8 K/UL (ref 1–4.8)
LYMPHOCYTES NFR BLD: 18 % (ref 18–48)
MAGNESIUM SERPL-MCNC: 1.6 MG/DL (ref 1.6–2.6)
MCH RBC QN AUTO: 29.9 PG (ref 27–31)
MCHC RBC AUTO-ENTMCNC: 30.7 G/DL (ref 32–36)
MCV RBC AUTO: 97 FL (ref 82–98)
MONOCYTES # BLD AUTO: 2.8 K/UL (ref 0.3–1)
MONOCYTES NFR BLD: 27.6 % (ref 4–15)
NEUTROPHILS # BLD AUTO: 4.5 K/UL (ref 1.8–7.7)
NEUTROPHILS NFR BLD: 44.3 % (ref 38–73)
NRBC BLD-RTO: 0 /100 WBC
OVALOCYTES BLD QL SMEAR: ABNORMAL
PHOSPHATE SERPL-MCNC: 2.7 MG/DL (ref 2.7–4.5)
PLATELET # BLD AUTO: 694 K/UL (ref 150–450)
PLATELET BLD QL SMEAR: ABNORMAL
PMV BLD AUTO: 9.5 FL (ref 9.2–12.9)
POIKILOCYTOSIS BLD QL SMEAR: SLIGHT
POLYCHROMASIA BLD QL SMEAR: ABNORMAL
POTASSIUM SERPL-SCNC: 3.6 MMOL/L (ref 3.5–5.1)
PROT SERPL-MCNC: 5.3 G/DL (ref 6–8.4)
RBC # BLD AUTO: 3.01 M/UL (ref 4–5.4)
SCHISTOCYTES BLD QL SMEAR: ABNORMAL
SODIUM SERPL-SCNC: 137 MMOL/L (ref 136–145)
TOXIC GRANULES BLD QL SMEAR: PRESENT
WBC # BLD AUTO: 10.22 K/UL (ref 3.9–12.7)

## 2024-06-01 PROCEDURE — 36415 COLL VENOUS BLD VENIPUNCTURE: CPT | Mod: NTX

## 2024-06-01 PROCEDURE — 94664 DEMO&/EVAL PT USE INHALER: CPT | Mod: NTX

## 2024-06-01 PROCEDURE — 25000003 PHARM REV CODE 250: Mod: NTX

## 2024-06-01 PROCEDURE — 20600001 HC STEP DOWN PRIVATE ROOM: Mod: NTX

## 2024-06-01 PROCEDURE — 94761 N-INVAS EAR/PLS OXIMETRY MLT: CPT | Mod: NTX

## 2024-06-01 PROCEDURE — 94668 MNPJ CHEST WALL SBSQ: CPT | Mod: NTX

## 2024-06-01 PROCEDURE — 80053 COMPREHEN METABOLIC PANEL: CPT | Mod: NTX

## 2024-06-01 PROCEDURE — 25000003 PHARM REV CODE 250: Mod: NTX | Performed by: INTERNAL MEDICINE

## 2024-06-01 PROCEDURE — 25000003 PHARM REV CODE 250: Mod: NTX | Performed by: STUDENT IN AN ORGANIZED HEALTH CARE EDUCATION/TRAINING PROGRAM

## 2024-06-01 PROCEDURE — 85025 COMPLETE CBC W/AUTO DIFF WBC: CPT | Mod: NTX | Performed by: STUDENT IN AN ORGANIZED HEALTH CARE EDUCATION/TRAINING PROGRAM

## 2024-06-01 PROCEDURE — 94640 AIRWAY INHALATION TREATMENT: CPT | Mod: NTX

## 2024-06-01 PROCEDURE — 84100 ASSAY OF PHOSPHORUS: CPT | Mod: NTX

## 2024-06-01 PROCEDURE — 27000646 HC AEROBIKA DEVICE: Mod: NTX

## 2024-06-01 PROCEDURE — 83735 ASSAY OF MAGNESIUM: CPT | Mod: NTX

## 2024-06-01 PROCEDURE — 36415 COLL VENOUS BLD VENIPUNCTURE: CPT | Mod: NTX | Performed by: STUDENT IN AN ORGANIZED HEALTH CARE EDUCATION/TRAINING PROGRAM

## 2024-06-01 PROCEDURE — 63600175 PHARM REV CODE 636 W HCPCS: Mod: NTX | Performed by: STUDENT IN AN ORGANIZED HEALTH CARE EDUCATION/TRAINING PROGRAM

## 2024-06-01 PROCEDURE — 99900035 HC TECH TIME PER 15 MIN (STAT): Mod: NTX

## 2024-06-01 RX ORDER — CALCIUM CARBONATE 200(500)MG
500 TABLET,CHEWABLE ORAL 2 TIMES DAILY
Status: DISCONTINUED | OUTPATIENT
Start: 2024-06-01 | End: 2024-06-03 | Stop reason: HOSPADM

## 2024-06-01 RX ORDER — POTASSIUM CHLORIDE 20 MEQ/1
40 TABLET, EXTENDED RELEASE ORAL ONCE
Status: COMPLETED | OUTPATIENT
Start: 2024-06-01 | End: 2024-06-01

## 2024-06-01 RX ORDER — FUROSEMIDE 10 MG/ML
20 INJECTION INTRAMUSCULAR; INTRAVENOUS ONCE
Status: COMPLETED | OUTPATIENT
Start: 2024-06-01 | End: 2024-06-01

## 2024-06-01 RX ADMIN — POTASSIUM CHLORIDE 40 MEQ: 1500 TABLET, EXTENDED RELEASE ORAL at 09:06

## 2024-06-01 RX ADMIN — COLCHICINE 0.6 MG: 0.6 TABLET, FILM COATED ORAL at 09:06

## 2024-06-01 RX ADMIN — DICLOFENAC SODIUM 2 G: 10 GEL TOPICAL at 09:06

## 2024-06-01 RX ADMIN — ALLOPURINOL 200 MG: 100 TABLET ORAL at 09:06

## 2024-06-01 RX ADMIN — CALCIUM CARBONATE (ANTACID) CHEW TAB 500 MG 500 MG: 500 CHEW TAB at 08:06

## 2024-06-01 RX ADMIN — ALUMINUM HYDROXIDE, MAGNESIUM HYDROXIDE, AND DIMETHICONE 30 ML: 400; 400; 40 SUSPENSION ORAL at 09:06

## 2024-06-01 RX ADMIN — TIOTROPIUM BROMIDE INHALATION SPRAY 2 PUFF: 3.12 SPRAY, METERED RESPIRATORY (INHALATION) at 09:06

## 2024-06-01 RX ADMIN — ACETAMINOPHEN 650 MG: 650 SOLUTION ORAL at 08:06

## 2024-06-01 RX ADMIN — Medication 6 MG: at 08:06

## 2024-06-01 RX ADMIN — PANTOPRAZOLE SODIUM 40 MG: 40 TABLET, DELAYED RELEASE ORAL at 06:06

## 2024-06-01 RX ADMIN — FUROSEMIDE 20 MG: 10 INJECTION, SOLUTION INTRAMUSCULAR; INTRAVENOUS at 12:06

## 2024-06-01 RX ADMIN — CALCIUM CARBONATE (ANTACID) CHEW TAB 500 MG 500 MG: 500 CHEW TAB at 09:06

## 2024-06-01 RX ADMIN — FLUTICASONE FUROATE AND VILANTEROL TRIFENATATE 1 PUFF: 100; 25 POWDER RESPIRATORY (INHALATION) at 09:06

## 2024-06-01 RX ADMIN — ATORVASTATIN CALCIUM 80 MG: 40 TABLET, FILM COATED ORAL at 09:06

## 2024-06-01 RX ADMIN — ACETAMINOPHEN 650 MG: 650 SOLUTION ORAL at 09:06

## 2024-06-01 RX ADMIN — PANTOPRAZOLE SODIUM 40 MG: 40 TABLET, DELAYED RELEASE ORAL at 05:06

## 2024-06-01 RX ADMIN — QUETIAPINE FUMARATE 50 MG: 25 TABLET ORAL at 08:06

## 2024-06-01 NOTE — SUBJECTIVE & OBJECTIVE
Interval History: Large BM this am. Some LE edema-- takes thiazide at home and intermittent lasix per patient.  No blood in stools. No nausea, vomiting, fevers, chills, night sweats. Daughter at bedside- encouraging patient to ambulate during day w/assistance.     Objective:     Vital Signs (Most Recent):  Temp: 97.3 °F (36.3 °C) (06/01/24 0904)  Pulse: 100 (06/01/24 0904)  Resp: 18 (06/01/24 0904)  BP: 99/67 (06/01/24 0904)  SpO2: 98 % (06/01/24 0904) Vital Signs (24h Range):  Temp:  [97.3 °F (36.3 °C)-98.7 °F (37.1 °C)] 97.3 °F (36.3 °C)  Pulse:  [] 100  Resp:  [16-20] 18  SpO2:  [96 %-98 %] 98 %  BP: ()/(65-93) 99/67     Weight: 52.6 kg (115 lb 15.4 oz)  Body mass index is 20.54 kg/m².  No intake or output data in the 24 hours ending 06/01/24 1226     Physical Exam  Gen: in NAD, appears stated age  Neuro: AAOx3, motor, sensory, and strength grossly intact BL  HEENT: NTNC, EOMI, PERRL, MMM  CVS: RRR, no m/r/g; S1/S2 auscultated with no S3 or S4; capillary refill < 2 sec  Resp: lungs CTAB, no w/r/r; no belabored breathing or accessory muscle use appreciated   Abd: well-healing vertical midline incision, BS+ in all 4 quadrants; NTND, soft to palpation; no organomegaly appreciated   Extrem: pulses full, equal, and regular over all 4 extremities; no UE, LE edema- LLE>RLE- no TTP, no erythema    Significant Labs:  CBC:  Recent Labs   Lab 05/31/24  1534 06/01/24  0847   WBC 10.29 10.22   HGB 9.8* 9.0*   HCT 32.4* 29.3*   * 694*     CMP:  Recent Labs   Lab 05/31/24  0432 06/01/24  0417    137   K 4.2 3.6    109   CO2 19* 21*   GLU 75 79   BUN 4* 7*   CREATININE 0.7 0.7   CALCIUM 7.6* 7.5*   PROT 5.2* 5.3*   ALBUMIN 1.8* 1.9*   BILITOT 0.3 0.4   ALKPHOS 77 90   AST 24 24   ALT 22 20   ANIONGAP 7* 7*

## 2024-06-01 NOTE — ASSESSMENT & PLAN NOTE
- patient reports intermittent swelling of LE prior to admission- worse when legs hanging down  - she has noticed worsening edema in the past couple of days- normally she is on HCTZ at home and will intermittently utilize lasix  - will give 1x dose of IV lasix 20mg  - doppler US of the LLE to r/o DVT as well- not currently on blood thinners w/recent significant GIB

## 2024-06-01 NOTE — ASSESSMENT & PLAN NOTE
- likely reactive in setting of recent surgery  - plt now down-trending   - will monitor trend for now

## 2024-06-01 NOTE — ASSESSMENT & PLAN NOTE
Patient has hypokalemia which is Acute and currently controlled. Most recent potassium levels reviewed-   Lab Results   Component Value Date    K 3.6 06/01/2024   . Will continue potassium replacement per protocol and recheck repeat levels after replacement completed.

## 2024-06-01 NOTE — PROGRESS NOTES
Patient escorted off unit via stretcher for ultrasound of the lower extremities. Patient transferred to stretcher with maximum assistance. No acute distress noted. Patient denies pain and peripheral IV intact. No redness or swelling noted. Awaiting patient's return.    Patient returned to the unit at 1730.

## 2024-06-01 NOTE — PROGRESS NOTES
"[facility] - Stepdown Flex ([place] [address])  [facility]  Progress Note    Patient Name: Jessica Floyd  MRN: [id number]  Patient Class: IP- Inpatient   Admission Date: 5/14/2024  Length of Stay: 18 days  Attending Physician: Lexy Olivares MD  Primary Care Provider: Effie Olmedo MD        Subjective:     Principal Problem:Acute lower GI bleeding        HPI:  Mrs. Floyd is a 73 year old female with PMH notable for COPD on home 2L NC, pulm HTN, R carotid stent on ASA, HFpEF, anemia, and ERIK on infusions who presented to [facility] ED with the melena.  is at bedside and reports that the patient had an episode of diarrhea and lethargy yesterday. She reports that it felt as if "she was going to pass out", so she was using her walker to get around. Patient's  reports that this has never happened before. Patient denies excessive OTC NSAID use. She reports that she drinks 2 wine glasses a day. This morning when the patient woke up she felt very lethargic. Patient went to use the bathroom, and  noted bright red stool in the bowl. Patient reported a pre syncopal episode and felt lightheaded. She admits to weakness, SOB, light-headedness, hematochezia, and pre syncope. Patient denies nausea, vomiting, hematemesis, falls, confusion, chest pain, urinary changes, and fevers. Patient reports taking aspirin daily but no DOAC.      Hemoglobin 5.9 at presentation to ED (baseline ~10). GI and IR consulted given concern for active bleed seen on CTA. In the emergency department she was given 1L IVF and a 80 mg IV protonix bolus. Critical care medicine. consulted for GIB. CTA in the Ed revealed "Acute gastrointestinal bleed at the level of the cecum". IR planning for embolization today. Patient is also receiving 2 pRBC due to acute anemia.     Overview/Hospital Course:  73yoF w/PMHx notable for COPD, pulm HTN, R carotid stent on ASA, HFpEF, anemia, and ERIK on infusions who presented to [facility] ED with melena. Determined " to have an active bleed at the level of the cecum s/p embolization with IR. Post operatively she was noted to have increasing abdominal pain and underwent ex lap with general surgery for incarcerated inguinal hernia on 5/17. She has been stable postoperatively. She was requiring intermittent precedex for severe delirium/agitation but has now been off precedex for 2 days and is stable for stepdown. Hgb stable, tolerating regular diet.      Deemed appropriate for transfer to the floor on 5/22/2024, and was accepted to  team Z for further care and management.  On 5/23/24 patient had significant clinical decline, she had bowel obstruction, respiratory failure and fall in hemoglobin secondary to GI bleed.  She was transferred to ICU NG tube placed and general surgery consulted.  During stay in ICU, patient had improvement, NG tube was removed, no further reports of bleed and hemoglobin stable, she was accepted to [facility] and stepped down to medicine again.  Insurance denied acute rehab but is okay with skilled nursing facility. Awaiting SNF placement.     Interval History: Large BM this am. Some LE edema-- takes thiazide at home and intermittent lasix per patient.  No blood in stools. No nausea, vomiting, fevers, chills, night sweats. Daughter at bedside- encouraging patient to ambulate during day w/assistance.     Objective:     Vital Signs (Most Recent):  Temp: 97.3 °F (36.3 °C) (06/01/24 0904)  Pulse: 100 (06/01/24 0904)  Resp: 18 (06/01/24 0904)  BP: 99/67 (06/01/24 0904)  SpO2: 98 % (06/01/24 0904) Vital Signs (24h Range):  Temp:  [97.3 °F (36.3 °C)-98.7 °F (37.1 °C)] 97.3 °F (36.3 °C)  Pulse:  [] 100  Resp:  [16-20] 18  SpO2:  [96 %-98 %] 98 %  BP: ()/(65-93) 99/67     Weight: 52.6 kg (115 lb 15.4 oz)  Body mass index is 20.54 kg/m².  No intake or output data in the 24 hours ending 06/01/24 1226     Physical Exam  Gen: in NAD, appears stated age  Neuro: AAOx3, motor, sensory, and strength  grossly intact BL  HEENT: NTNC, EOMI, PERRL, MMM  CVS: RRR, no m/r/g; S1/S2 auscultated with no S3 or S4; capillary refill < 2 sec  Resp: lungs CTAB, no w/r/r; no belabored breathing or accessory muscle use appreciated   Abd: well-healing vertical midline incision, BS+ in all 4 quadrants; NTND, soft to palpation; no organomegaly appreciated   Extrem: pulses full, equal, and regular over all 4 extremities; no UE, LE edema- LLE>RLE- no TTP, no erythema    Significant Labs:  CBC:  Recent Labs   Lab 05/31/24  1534 06/01/24  0847   WBC 10.29 10.22   HGB 9.8* 9.0*   HCT 32.4* 29.3*   * 694*     CMP:  Recent Labs   Lab 05/31/24  0432 06/01/24  0417    137   K 4.2 3.6    109   CO2 19* 21*   GLU 75 79   BUN 4* 7*   CREATININE 0.7 0.7   CALCIUM 7.6* 7.5*   PROT 5.2* 5.3*   ALBUMIN 1.8* 1.9*   BILITOT 0.3 0.4   ALKPHOS 77 90   AST 24 24   ALT 22 20   ANIONGAP 7* 7*         Assessment/Plan:      * Acute lower GI bleeding  - ICU stepdown for GIB  - s/p IR embolization and ex-lap via gen surg  - briefly back on floor, but back to ICU due to ileus and bleed- upon improvement in condition, stepped back down to floor  - stable H:H  - PPI BID  - Gen surg planning for staple removal 05/31  - Tolerating regular diet     Localized swelling of left lower extremity  - patient reports intermittent swelling of LE prior to admission- worse when legs hanging down  - she has noticed worsening edema in the past couple of days- normally she is on HCTZ at home and will intermittently utilize lasix  - will give 1x dose of IV lasix 20mg  - doppler US of the LLE to r/o DVT as well- not currently on blood thinners w/recent significant GIB      Thrombocytosis  - likely reactive in setting of recent surgery  - plt now down-trending   - will monitor trend for now      Abrasion        Left leg pain  - associated LLE swelling  - doppler US ordered to r/o DVT      Incarcerated inguinal hernia        Ischemia, bowel  - s/p ex lap, gen  surg following, staple removal tomorrow, advanced to regular diet- no issues      History Situational (ie Stress Induced) syncope ([facility] admission 12-25-23        (HFpEF) heart failure with preserved ejection fraction  - Interval history and physical exam findings as described above  - Most recent TTE results:  Results for orders placed during the hospital encounter of 10/28/23    Echo    Interpretation Summary    Left Ventricle: The left ventricle is normal in size. Normal wall thickness. Normal wall motion. There is normal systolic function with a visually estimated ejection fraction of 60 - 65%. There is normal diastolic function.    Right Ventricle: Mild right ventricular enlargement. Wall thickness is normal. Right ventricle wall motion  is normal. Systolic function is normal.    Aortic Valve: The aortic valve is a unicuspid valve. There is mild aortic valve sclerosis. There is trace aortic regurgitation.    Mitral Valve: There is mild regurgitation.    Pulmonary Artery: There is mild pulmonary hypertension. The estimated pulmonary artery systolic pressure is 40 mmHg.    IVC/SVC: Intermediate venous pressure at 8 mmHg.    - Clinically euvolemic  - Continue home cardioprudent regimen  - Strict I/Os  - 1.5L fluid restriction   - Daily weights  - Will continue to monitor on tele      Pulmonary emphysema  - On home oxygen.  Continue with nebs p.r.n. and home inhaler    Acute hypoxic on chronic hypercapnic respiratory failure  - 2/2 COPD  - currently on home oxygen     Gout  - continue allopurinol      Impaired mobility  - awaiting SNF placement- very weak and debilitated    Acute blood loss anemia  - See #1    Leukocytosis  - Resolved. Completed course of IV Zosyn      Hypertension  - BP currently well-controlled  - Continue home regimen of amlodipine 5mg qday   - Will continue to monitor and further titrate antihypertensives as clinically indicated       Hypokalemia  Patient has hypokalemia which is Acute and  currently controlled. Most recent potassium levels reviewed-   Lab Results   Component Value Date    K 3.6 06/01/2024   . Will continue potassium replacement per protocol and recheck repeat levels after replacement completed.     Bilateral carotid artery stenosis  - continue statin        VTE Risk Mitigation (From admission, onward)           Ordered     Place sequential compression device  Until discontinued         05/23/24 0826     IP VTE HIGH RISK PATIENT  Once         05/15/24 0104                    Discharge Planning   DEBBIE: 6/3/2024     Code Status: Full Code   Is the patient medically ready for discharge?:     Reason for patient still in hospital (select all that apply): Pending disposition  Discharge Plan A: Skilled Nursing Facility   Discharge Delays: (!) Payor Issues              Lexy Olivares MD  Department of Hospital Medicine   [facility] - Stepdown Flex ([place]-14)

## 2024-06-02 LAB
ALBUMIN SERPL BCP-MCNC: 2 G/DL (ref 3.5–5.2)
ALP SERPL-CCNC: 97 U/L (ref 55–135)
ALT SERPL W/O P-5'-P-CCNC: 18 U/L (ref 10–44)
ANION GAP SERPL CALC-SCNC: 9 MMOL/L (ref 8–16)
ANISOCYTOSIS BLD QL SMEAR: SLIGHT
AST SERPL-CCNC: 19 U/L (ref 10–40)
BASOPHILS # BLD AUTO: 0.26 K/UL (ref 0–0.2)
BASOPHILS NFR BLD: 2.8 % (ref 0–1.9)
BILIRUB SERPL-MCNC: 0.4 MG/DL (ref 0.1–1)
BUN SERPL-MCNC: 9 MG/DL (ref 8–23)
CALCIUM SERPL-MCNC: 7.9 MG/DL (ref 8.7–10.5)
CHLORIDE SERPL-SCNC: 107 MMOL/L (ref 95–110)
CO2 SERPL-SCNC: 20 MMOL/L (ref 23–29)
CREAT SERPL-MCNC: 0.7 MG/DL (ref 0.5–1.4)
DIFFERENTIAL METHOD BLD: ABNORMAL
EOSINOPHIL # BLD AUTO: 0.4 K/UL (ref 0–0.5)
EOSINOPHIL NFR BLD: 4.6 % (ref 0–8)
ERYTHROCYTE [DISTWIDTH] IN BLOOD BY AUTOMATED COUNT: 19 % (ref 11.5–14.5)
EST. GFR  (NO RACE VARIABLE): >60 ML/MIN/1.73 M^2
GLUCOSE SERPL-MCNC: 80 MG/DL (ref 70–110)
HCT VFR BLD AUTO: 28.8 % (ref 37–48.5)
HGB BLD-MCNC: 8.9 G/DL (ref 12–16)
HYPOCHROMIA BLD QL SMEAR: ABNORMAL
IMM GRANULOCYTES # BLD AUTO: 0.1 K/UL (ref 0–0.04)
IMM GRANULOCYTES NFR BLD AUTO: 1.1 % (ref 0–0.5)
LYMPHOCYTES # BLD AUTO: 1.6 K/UL (ref 1–4.8)
LYMPHOCYTES NFR BLD: 16.6 % (ref 18–48)
MAGNESIUM SERPL-MCNC: 1.4 MG/DL (ref 1.6–2.6)
MCH RBC QN AUTO: 30.2 PG (ref 27–31)
MCHC RBC AUTO-ENTMCNC: 30.9 G/DL (ref 32–36)
MCV RBC AUTO: 98 FL (ref 82–98)
MONOCYTES # BLD AUTO: 2.6 K/UL (ref 0.3–1)
MONOCYTES NFR BLD: 27.4 % (ref 4–15)
NEUTROPHILS # BLD AUTO: 4.4 K/UL (ref 1.8–7.7)
NEUTROPHILS NFR BLD: 47.5 % (ref 38–73)
NRBC BLD-RTO: 0 /100 WBC
OVALOCYTES BLD QL SMEAR: ABNORMAL
PHOSPHATE SERPL-MCNC: 2.5 MG/DL (ref 2.7–4.5)
PLATELET # BLD AUTO: 670 K/UL (ref 150–450)
PMV BLD AUTO: 9.6 FL (ref 9.2–12.9)
POIKILOCYTOSIS BLD QL SMEAR: SLIGHT
POLYCHROMASIA BLD QL SMEAR: ABNORMAL
POTASSIUM SERPL-SCNC: 4.6 MMOL/L (ref 3.5–5.1)
PROT SERPL-MCNC: 5.7 G/DL (ref 6–8.4)
RBC # BLD AUTO: 2.95 M/UL (ref 4–5.4)
SODIUM SERPL-SCNC: 136 MMOL/L (ref 136–145)
SPHEROCYTES BLD QL SMEAR: ABNORMAL
WBC # BLD AUTO: 9.31 K/UL (ref 3.9–12.7)

## 2024-06-02 PROCEDURE — 25000003 PHARM REV CODE 250: Mod: NTX | Performed by: INTERNAL MEDICINE

## 2024-06-02 PROCEDURE — 84100 ASSAY OF PHOSPHORUS: CPT | Mod: NTX

## 2024-06-02 PROCEDURE — 25000003 PHARM REV CODE 250: Mod: NTX

## 2024-06-02 PROCEDURE — 20600001 HC STEP DOWN PRIVATE ROOM: Mod: NTX

## 2024-06-02 PROCEDURE — 36415 COLL VENOUS BLD VENIPUNCTURE: CPT | Mod: NTX

## 2024-06-02 PROCEDURE — 25000003 PHARM REV CODE 250: Mod: NTX | Performed by: STUDENT IN AN ORGANIZED HEALTH CARE EDUCATION/TRAINING PROGRAM

## 2024-06-02 PROCEDURE — 85025 COMPLETE CBC W/AUTO DIFF WBC: CPT | Mod: NTX | Performed by: STUDENT IN AN ORGANIZED HEALTH CARE EDUCATION/TRAINING PROGRAM

## 2024-06-02 PROCEDURE — 80053 COMPREHEN METABOLIC PANEL: CPT | Mod: NTX

## 2024-06-02 PROCEDURE — 83735 ASSAY OF MAGNESIUM: CPT | Mod: NTX

## 2024-06-02 RX ORDER — POTASSIUM CHLORIDE 20 MEQ/1
40 TABLET, EXTENDED RELEASE ORAL ONCE
Status: COMPLETED | OUTPATIENT
Start: 2024-06-02 | End: 2024-06-02

## 2024-06-02 RX ADMIN — FLUTICASONE FUROATE AND VILANTEROL TRIFENATATE 1 PUFF: 100; 25 POWDER RESPIRATORY (INHALATION) at 08:06

## 2024-06-02 RX ADMIN — NASAL 1 SPRAY: 6.5 SPRAY NASAL at 02:06

## 2024-06-02 RX ADMIN — PANTOPRAZOLE SODIUM 40 MG: 40 TABLET, DELAYED RELEASE ORAL at 04:06

## 2024-06-02 RX ADMIN — QUETIAPINE FUMARATE 50 MG: 25 TABLET ORAL at 08:06

## 2024-06-02 RX ADMIN — DICLOFENAC SODIUM 2 G: 10 GEL TOPICAL at 08:06

## 2024-06-02 RX ADMIN — COLCHICINE 0.6 MG: 0.6 TABLET, FILM COATED ORAL at 08:06

## 2024-06-02 RX ADMIN — POTASSIUM CHLORIDE 40 MEQ: 1500 TABLET, EXTENDED RELEASE ORAL at 01:06

## 2024-06-02 RX ADMIN — ATORVASTATIN CALCIUM 80 MG: 40 TABLET, FILM COATED ORAL at 08:06

## 2024-06-02 RX ADMIN — ALLOPURINOL 200 MG: 100 TABLET ORAL at 08:06

## 2024-06-02 RX ADMIN — ALUMINUM HYDROXIDE, MAGNESIUM HYDROXIDE, AND DIMETHICONE 30 ML: 400; 400; 40 SUSPENSION ORAL at 09:06

## 2024-06-02 RX ADMIN — AMLODIPINE BESYLATE 5 MG: 5 TABLET ORAL at 08:06

## 2024-06-02 RX ADMIN — ACETAMINOPHEN 650 MG: 650 SOLUTION ORAL at 02:06

## 2024-06-02 RX ADMIN — PANTOPRAZOLE SODIUM 40 MG: 40 TABLET, DELAYED RELEASE ORAL at 06:06

## 2024-06-02 RX ADMIN — TIOTROPIUM BROMIDE INHALATION SPRAY 2 PUFF: 3.12 SPRAY, METERED RESPIRATORY (INHALATION) at 08:06

## 2024-06-02 RX ADMIN — CALCIUM CARBONATE (ANTACID) CHEW TAB 500 MG 500 MG: 500 CHEW TAB at 08:06

## 2024-06-02 RX ADMIN — Medication 6 MG: at 08:06

## 2024-06-02 NOTE — PLAN OF CARE
Patient aaox4. Patient placed on iv furosemide once , calcium and oral potasium. Patient OT/PT discontinued. Patient complains of pain and her prn tylenol given. Patient ultrasound of the lower left extremity done.Plan of care reviewed with patient and patient verbalized understanding. No other concerns at this time.                    Problem: Adult Inpatient Plan of Care  Goal: Plan of Care Review  Outcome: Progressing  Goal: Patient-Specific Goal (Individualized)  Outcome: Progressing  Goal: Absence of Hospital-Acquired Illness or Injury  Outcome: Progressing  Goal: Optimal Comfort and Wellbeing  Outcome: Progressing  Goal: Readiness for Transition of Care  Outcome: Progressing

## 2024-06-02 NOTE — ASSESSMENT & PLAN NOTE
- ICU stepdown for GIB  - s/p IR embolization and ex-lap via gen surg  - briefly back on floor, but back to ICU due to ileus and bleed- upon improvement in condition, stepped back down to floor  - stable H:H  - PPI BID  - Gen surg w/staple removal 05/31  - Tolerating regular diet

## 2024-06-02 NOTE — ASSESSMENT & PLAN NOTE
- patient reports intermittent swelling of LE prior to admission- worse when legs hanging down  - she has noticed worsening edema in the past couple of days- normally she is on HCTZ at home and will intermittently utilize lasix  - s/p 1x dose of IV lasix yesterday   - doppler US of the LLE neg for DVT  - improved

## 2024-06-02 NOTE — SUBJECTIVE & OBJECTIVE
Interval History: Regular BM this am. Good urination following lasix yesterday (not adequately documented via I's and O's). Improved LLE swelling- per patient, she has had pain and issues with her left leg prior to admission- she typically will have swelling in this leg. No nausea, vomiting, fevers, chills, night sweats. Daughter at bedside- encouraging patient to ambulate during day w/assistance.     Objective:     Vital Signs (Most Recent):  Temp: 97.4 °F (36.3 °C) (06/02/24 0743)  Pulse: 82 (06/02/24 0838)  Resp: 18 (06/02/24 0838)  BP: 132/70 (06/02/24 0743)  SpO2: 98 % (06/02/24 0838) Vital Signs (24h Range):  Temp:  [97.4 °F (36.3 °C)-98.6 °F (37 °C)] 97.4 °F (36.3 °C)  Pulse:  [82-92] 82  Resp:  [18-20] 18  SpO2:  [96 %-100 %] 98 %  BP: ()/(59-71) 132/70     Weight: 52.6 kg (115 lb 15.4 oz)  Body mass index is 20.54 kg/m².  No intake or output data in the 24 hours ending 06/02/24 1108     Physical Exam  Gen: in NAD, appears stated age  Neuro: AAOx3, motor, sensory, and strength grossly intact BL  HEENT: NTNC, EOMI, PERRL, MMM  CVS: RRR, no m/r/g; S1/S2 auscultated with no S3 or S4; capillary refill < 2 sec  Resp: lungs CTAB, no w/r/r; no belabored breathing or accessory muscle use appreciated   Abd: well-healing vertical midline incision, BS+ in all 4 quadrants; NTND, soft to palpation; no organomegaly appreciated   Extrem: pulses full, equal, and regular over all 4 extremities; no UE or LE edema     Significant Labs:  CBC:  Recent Labs   Lab 05/31/24  1534 06/01/24  0847   WBC 10.29 10.22   HGB 9.8* 9.0*   HCT 32.4* 29.3*   * 694*     CMP:  Recent Labs   Lab 06/01/24  0417      K 3.6      CO2 21*   GLU 79   BUN 7*   CREATININE 0.7   CALCIUM 7.5*   PROT 5.3*   ALBUMIN 1.9*   BILITOT 0.4   ALKPHOS 90   AST 24   ALT 20   ANIONGAP 7*

## 2024-06-02 NOTE — PLAN OF CARE
Patient aaox4. Patient placed on oral potasium once. Patient complains of pain and her prn tylenol given. Patient due medication administered and patient tolerating well. Patient position changed per order. Plan of care reviewed with patient and patient verbalized understanding. No other concerns at this time.                            Problem: Adult Inpatient Plan of Care  Goal: Plan of Care Review  Outcome: Progressing  Goal: Patient-Specific Goal (Individualized)  Outcome: Progressing  Goal: Absence of Hospital-Acquired Illness or Injury  Outcome: Progressing  Goal: Optimal Comfort and Wellbeing  Outcome: Progressing  Goal: Readiness for Transition of Care  Outcome: Progressing

## 2024-06-02 NOTE — PROGRESS NOTES
"[facility] - Stepdown Flex ([place] [address])  [facility] Medicine  Progress Note    Patient Name: Jessica Floyd  MRN: [id number]  Patient Class: IP- Inpatient   Admission Date: 5/14/2024  Length of Stay: 19 days  Attending Physician: Lexy Olivares MD  Primary Care Provider: Effie Olmedo MD        Subjective:     Principal Problem:Acute lower GI bleeding        HPI:  Mrs. Floyd is a 73 year old female with PMH notable for COPD on home 2L NC, pulm HTN, R carotid stent on ASA, HFpEF, anemia, and ERIK on infusions who presented to [facility] ED with the melena.  is at bedside and reports that the patient had an episode of diarrhea and lethargy yesterday. She reports that it felt as if "she was going to pass out", so she was using her walker to get around. Patient's  reports that this has never happened before. Patient denies excessive OTC NSAID use. She reports that she drinks 2 wine glasses a day. This morning when the patient woke up she felt very lethargic. Patient went to use the bathroom, and  noted bright red stool in the bowl. Patient reported a pre syncopal episode and felt lightheaded. She admits to weakness, SOB, light-headedness, hematochezia, and pre syncope. Patient denies nausea, vomiting, hematemesis, falls, confusion, chest pain, urinary changes, and fevers. Patient reports taking aspirin daily but no DOAC.      Hemoglobin 5.9 at presentation to ED (baseline ~10). GI and IR consulted given concern for active bleed seen on CTA. In the emergency department she was given 1L IVF and a 80 mg IV protonix bolus. Critical care medicine. consulted for GIB. CTA in the Ed revealed "Acute gastrointestinal bleed at the level of the cecum". IR planning for embolization today. Patient is also receiving 2 pRBC due to acute anemia.     Overview/Hospital Course:  73yoF w/PMHx notable for COPD, pulm HTN, R carotid stent on ASA, HFpEF, anemia, and ERIK on infusions who presented to [facility] ED with melena. Determined " to have an active bleed at the level of the cecum s/p embolization with IR. Post operatively she was noted to have increasing abdominal pain and underwent ex lap with general surgery for incarcerated inguinal hernia on 5/17. She has been stable postoperatively. She was requiring intermittent precedex for severe delirium/agitation but has now been off precedex for 2 days and is stable for stepdown. Hgb stable, tolerating regular diet.      Deemed appropriate for transfer to the floor on 5/22/2024, and was accepted to  team Z for further care and management.  On 5/23/24 patient had significant clinical decline, she had bowel obstruction, respiratory failure and fall in hemoglobin secondary to GI bleed.  She was transferred to ICU NG tube placed and general surgery consulted.  During stay in ICU, patient had improvement, NG tube was removed, no further reports of bleed and hemoglobin stable, she was accepted to [facility] and stepped down to medicine again.  Insurance denied acute rehab but is okay with skilled nursing facility. Awaiting SNF placement.     Interval History: Regular BM this am. Good urination following lasix yesterday (not adequately documented via I's and O's). Improved LLE swelling- per patient, she has had pain and issues with her left leg prior to admission- she typically will have swelling in this leg. No nausea, vomiting, fevers, chills, night sweats. Daughter at bedside- encouraging patient to ambulate during day w/assistance.     Objective:     Vital Signs (Most Recent):  Temp: 97.4 °F (36.3 °C) (06/02/24 0743)  Pulse: 82 (06/02/24 0838)  Resp: 18 (06/02/24 0838)  BP: 132/70 (06/02/24 0743)  SpO2: 98 % (06/02/24 0838) Vital Signs (24h Range):  Temp:  [97.4 °F (36.3 °C)-98.6 °F (37 °C)] 97.4 °F (36.3 °C)  Pulse:  [82-92] 82  Resp:  [18-20] 18  SpO2:  [96 %-100 %] 98 %  BP: ()/(59-71) 132/70     Weight: 52.6 kg (115 lb 15.4 oz)  Body mass index is 20.54 kg/m².  No intake or output data  in the 24 hours ending 06/02/24 1108     Physical Exam  Gen: in NAD, appears stated age  Neuro: AAOx3, motor, sensory, and strength grossly intact BL  HEENT: NTNC, EOMI, PERRL, MMM  CVS: RRR, no m/r/g; S1/S2 auscultated with no S3 or S4; capillary refill < 2 sec  Resp: lungs CTAB, no w/r/r; no belabored breathing or accessory muscle use appreciated   Abd: well-healing vertical midline incision, BS+ in all 4 quadrants; NTND, soft to palpation; no organomegaly appreciated   Extrem: pulses full, equal, and regular over all 4 extremities; no UE or LE edema     Significant Labs:  CBC:  Recent Labs   Lab 05/31/24  1534 06/01/24  0847   WBC 10.29 10.22   HGB 9.8* 9.0*   HCT 32.4* 29.3*   * 694*     CMP:  Recent Labs   Lab 06/01/24  0417      K 3.6      CO2 21*   GLU 79   BUN 7*   CREATININE 0.7   CALCIUM 7.5*   PROT 5.3*   ALBUMIN 1.9*   BILITOT 0.4   ALKPHOS 90   AST 24   ALT 20   ANIONGAP 7*         Assessment/Plan:      * Acute lower GI bleeding  - ICU stepdown for GIB  - s/p IR embolization and ex-lap via gen surg  - briefly back on floor, but back to ICU due to ileus and bleed- upon improvement in condition, stepped back down to floor  - stable H:H  - PPI BID  - Gen surg w/staple removal 05/31  - Tolerating regular diet     Localized swelling of left lower extremity  - patient reports intermittent swelling of LE prior to admission- worse when legs hanging down  - she has noticed worsening edema in the past couple of days- normally she is on HCTZ at home and will intermittently utilize lasix  - s/p 1x dose of IV lasix yesterday   - doppler US of the LLE neg for DVT  - improved      Thrombocytosis  - likely reactive in setting of recent surgery  - plt now down-trending   - will monitor trend for now      Abrasion        Left leg pain  - associated LLE swelling  - doppler US neg      Incarcerated inguinal hernia        Ischemia, bowel  - s/p ex lap, gen surg previously following- s/p staple removal-  tolerating regular diet    History Situational (ie Stress Induced) syncope ([facility] admission 12-25-23        (HFpEF) heart failure with preserved ejection fraction  - Interval history and physical exam findings as described above  - Most recent TTE results:  Results for orders placed during the hospital encounter of 10/28/23    Echo    Interpretation Summary    Left Ventricle: The left ventricle is normal in size. Normal wall thickness. Normal wall motion. There is normal systolic function with a visually estimated ejection fraction of 60 - 65%. There is normal diastolic function.    Right Ventricle: Mild right ventricular enlargement. Wall thickness is normal. Right ventricle wall motion  is normal. Systolic function is normal.    Aortic Valve: The aortic valve is a unicuspid valve. There is mild aortic valve sclerosis. There is trace aortic regurgitation.    Mitral Valve: There is mild regurgitation.    Pulmonary Artery: There is mild pulmonary hypertension. The estimated pulmonary artery systolic pressure is 40 mmHg.    IVC/SVC: Intermediate venous pressure at 8 mmHg.    - Clinically euvolemic  - Continue home cardioprudent regimen  - Strict I/Os  - 1.5L fluid restriction   - Daily weights  - Will continue to monitor on tele      Pulmonary emphysema  - On home oxygen.  Continue with nebs p.r.n. and home inhaler    Acute hypoxic on chronic hypercapnic respiratory failure  - 2/2 COPD  - currently on home oxygen     Gout  - continue allopurinol      Impaired mobility  - awaiting SNF placement- very weak and debilitated    Acute blood loss anemia  - See #1    Leukocytosis  - Resolved. Completed course of IV Zosyn      Hypertension  - BP currently well-controlled  - Continue home regimen of amlodipine 5mg qday   - Will continue to monitor and further titrate antihypertensives as clinically indicated       Hypokalemia  Patient has hypokalemia which is Acute and currently controlled. Most recent potassium levels  reviewed-   Lab Results   Component Value Date    K 3.6 06/01/2024   . Will continue potassium replacement per protocol and recheck repeat levels after replacement completed.     Bilateral carotid artery stenosis  - continue statin        VTE Risk Mitigation (From admission, onward)           Ordered     Place sequential compression device  Until discontinued         05/23/24 0826     IP VTE HIGH RISK PATIENT  Once         05/15/24 0104                    Discharge Planning   DEBBIE: 6/3/2024     Code Status: Full Code   Is the patient medically ready for discharge?:     Reason for patient still in hospital (select all that apply): Patient trending condition  Discharge Plan A: Skilled Nursing Facility   Discharge Delays: (!) Payor Issues                Lexy Olivares MD  Department of Hospital Medicine   [facility] - [facility] ([place]-[facility])

## 2024-06-03 ENCOUNTER — PATIENT MESSAGE (OUTPATIENT)
Dept: TRANSPLANT | Facility: CLINIC | Age: 74
End: 2024-06-03
Payer: MEDICARE

## 2024-06-03 VITALS
SYSTOLIC BLOOD PRESSURE: 120 MMHG | OXYGEN SATURATION: 96 % | WEIGHT: 115.94 LBS | BODY MASS INDEX: 20.54 KG/M2 | HEART RATE: 88 BPM | HEIGHT: 63 IN | TEMPERATURE: 99 F | RESPIRATION RATE: 22 BRPM | DIASTOLIC BLOOD PRESSURE: 59 MMHG

## 2024-06-03 LAB
ALBUMIN SERPL BCP-MCNC: 1.8 G/DL (ref 3.5–5.2)
ALP SERPL-CCNC: 84 U/L (ref 55–135)
ALT SERPL W/O P-5'-P-CCNC: 16 U/L (ref 10–44)
ANION GAP SERPL CALC-SCNC: 4 MMOL/L (ref 8–16)
AST SERPL-CCNC: 16 U/L (ref 10–40)
BASOPHILS # BLD AUTO: 0.29 K/UL (ref 0–0.2)
BASOPHILS NFR BLD: 3.6 % (ref 0–1.9)
BILIRUB SERPL-MCNC: 0.3 MG/DL (ref 0.1–1)
BUN SERPL-MCNC: 8 MG/DL (ref 8–23)
CALCIUM SERPL-MCNC: 7.7 MG/DL (ref 8.7–10.5)
CHLORIDE SERPL-SCNC: 108 MMOL/L (ref 95–110)
CO2 SERPL-SCNC: 24 MMOL/L (ref 23–29)
CREAT SERPL-MCNC: 0.7 MG/DL (ref 0.5–1.4)
DIFFERENTIAL METHOD BLD: ABNORMAL
EOSINOPHIL # BLD AUTO: 0.5 K/UL (ref 0–0.5)
EOSINOPHIL NFR BLD: 5.9 % (ref 0–8)
ERYTHROCYTE [DISTWIDTH] IN BLOOD BY AUTOMATED COUNT: 19 % (ref 11.5–14.5)
EST. GFR  (NO RACE VARIABLE): >60 ML/MIN/1.73 M^2
GLUCOSE SERPL-MCNC: 78 MG/DL (ref 70–110)
HCT VFR BLD AUTO: 27.3 % (ref 37–48.5)
HGB BLD-MCNC: 8.2 G/DL (ref 12–16)
IMM GRANULOCYTES # BLD AUTO: 0.1 K/UL (ref 0–0.04)
IMM GRANULOCYTES NFR BLD AUTO: 1.2 % (ref 0–0.5)
LYMPHOCYTES # BLD AUTO: 1.7 K/UL (ref 1–4.8)
LYMPHOCYTES NFR BLD: 20.9 % (ref 18–48)
MAGNESIUM SERPL-MCNC: 1.5 MG/DL (ref 1.6–2.6)
MCH RBC QN AUTO: 30.1 PG (ref 27–31)
MCHC RBC AUTO-ENTMCNC: 30 G/DL (ref 32–36)
MCV RBC AUTO: 100 FL (ref 82–98)
MONOCYTES # BLD AUTO: 2.2 K/UL (ref 0.3–1)
MONOCYTES NFR BLD: 27.6 % (ref 4–15)
NEUTROPHILS # BLD AUTO: 3.3 K/UL (ref 1.8–7.7)
NEUTROPHILS NFR BLD: 40.8 % (ref 38–73)
NRBC BLD-RTO: 0 /100 WBC
PHOSPHATE SERPL-MCNC: 2.4 MG/DL (ref 2.7–4.5)
PLATELET # BLD AUTO: 620 K/UL (ref 150–450)
PMV BLD AUTO: 9.7 FL (ref 9.2–12.9)
POTASSIUM SERPL-SCNC: 4.3 MMOL/L (ref 3.5–5.1)
PROT SERPL-MCNC: 5.2 G/DL (ref 6–8.4)
RBC # BLD AUTO: 2.72 M/UL (ref 4–5.4)
SODIUM SERPL-SCNC: 136 MMOL/L (ref 136–145)
WBC # BLD AUTO: 8.03 K/UL (ref 3.9–12.7)

## 2024-06-03 PROCEDURE — 25000003 PHARM REV CODE 250: Mod: NTX | Performed by: STUDENT IN AN ORGANIZED HEALTH CARE EDUCATION/TRAINING PROGRAM

## 2024-06-03 PROCEDURE — 36415 COLL VENOUS BLD VENIPUNCTURE: CPT | Mod: NTX

## 2024-06-03 PROCEDURE — 83735 ASSAY OF MAGNESIUM: CPT | Mod: NTX

## 2024-06-03 PROCEDURE — 80053 COMPREHEN METABOLIC PANEL: CPT | Mod: NTX

## 2024-06-03 PROCEDURE — 94761 N-INVAS EAR/PLS OXIMETRY MLT: CPT | Mod: NTX

## 2024-06-03 PROCEDURE — 25000003 PHARM REV CODE 250: Mod: NTX | Performed by: INTERNAL MEDICINE

## 2024-06-03 PROCEDURE — 94799 UNLISTED PULMONARY SVC/PX: CPT | Mod: NTX

## 2024-06-03 PROCEDURE — 97110 THERAPEUTIC EXERCISES: CPT | Mod: NTX,CQ

## 2024-06-03 PROCEDURE — 25000003 PHARM REV CODE 250: Mod: NTX

## 2024-06-03 PROCEDURE — 97530 THERAPEUTIC ACTIVITIES: CPT | Mod: NTX

## 2024-06-03 PROCEDURE — 94664 DEMO&/EVAL PT USE INHALER: CPT | Mod: NTX

## 2024-06-03 PROCEDURE — 85025 COMPLETE CBC W/AUTO DIFF WBC: CPT | Mod: NTX | Performed by: STUDENT IN AN ORGANIZED HEALTH CARE EDUCATION/TRAINING PROGRAM

## 2024-06-03 PROCEDURE — 99900035 HC TECH TIME PER 15 MIN (STAT): Mod: NTX

## 2024-06-03 PROCEDURE — 63600175 PHARM REV CODE 636 W HCPCS: Mod: NTX | Performed by: STUDENT IN AN ORGANIZED HEALTH CARE EDUCATION/TRAINING PROGRAM

## 2024-06-03 PROCEDURE — 84100 ASSAY OF PHOSPHORUS: CPT | Mod: NTX

## 2024-06-03 PROCEDURE — 63600175 PHARM REV CODE 636 W HCPCS: Mod: NTX

## 2024-06-03 PROCEDURE — 97535 SELF CARE MNGMENT TRAINING: CPT | Mod: NTX

## 2024-06-03 PROCEDURE — 94640 AIRWAY INHALATION TREATMENT: CPT | Mod: NTX

## 2024-06-03 RX ORDER — TALC
6 POWDER (GRAM) TOPICAL NIGHTLY PRN
Start: 2024-06-03

## 2024-06-03 RX ORDER — PANTOPRAZOLE SODIUM 40 MG/1
40 TABLET, DELAYED RELEASE ORAL 2 TIMES DAILY
Start: 2024-06-03 | End: 2024-09-01

## 2024-06-03 RX ORDER — ONDANSETRON 4 MG/1
4 TABLET, ORALLY DISINTEGRATING ORAL EVERY 6 HOURS PRN
Start: 2024-06-03

## 2024-06-03 RX ORDER — HYDROCHLOROTHIAZIDE 12.5 MG/1
12.5 TABLET ORAL DAILY
Start: 2024-06-03 | End: 2025-06-03

## 2024-06-03 RX ORDER — MAGNESIUM SULFATE HEPTAHYDRATE 40 MG/ML
2 INJECTION, SOLUTION INTRAVENOUS ONCE
Status: COMPLETED | OUTPATIENT
Start: 2024-06-03 | End: 2024-06-03

## 2024-06-03 RX ORDER — HYDROCHLOROTHIAZIDE 12.5 MG/1
12.5 TABLET ORAL DAILY
Status: DISCONTINUED | OUTPATIENT
Start: 2024-06-03 | End: 2024-06-03 | Stop reason: HOSPADM

## 2024-06-03 RX ORDER — LIDOCAINE 40 MG/G
CREAM TOPICAL 2 TIMES DAILY PRN
Start: 2024-06-03

## 2024-06-03 RX ADMIN — HYDROCHLOROTHIAZIDE 12.5 MG: 12.5 TABLET ORAL at 10:06

## 2024-06-03 RX ADMIN — ALLOPURINOL 200 MG: 100 TABLET ORAL at 09:06

## 2024-06-03 RX ADMIN — PANTOPRAZOLE SODIUM 40 MG: 40 TABLET, DELAYED RELEASE ORAL at 05:06

## 2024-06-03 RX ADMIN — AMLODIPINE BESYLATE 5 MG: 5 TABLET ORAL at 09:06

## 2024-06-03 RX ADMIN — TIOTROPIUM BROMIDE INHALATION SPRAY 2 PUFF: 3.12 SPRAY, METERED RESPIRATORY (INHALATION) at 08:06

## 2024-06-03 RX ADMIN — CYANOCOBALAMIN 1000 MCG: 1000 INJECTION INTRAMUSCULAR; SUBCUTANEOUS at 01:06

## 2024-06-03 RX ADMIN — MAGNESIUM SULFATE HEPTAHYDRATE 2 G: 40 INJECTION, SOLUTION INTRAVENOUS at 10:06

## 2024-06-03 RX ADMIN — CALCIUM CARBONATE (ANTACID) CHEW TAB 500 MG 500 MG: 500 CHEW TAB at 09:06

## 2024-06-03 RX ADMIN — DICLOFENAC SODIUM 2 G: 10 GEL TOPICAL at 09:06

## 2024-06-03 RX ADMIN — FLUTICASONE FUROATE AND VILANTEROL TRIFENATATE 1 PUFF: 100; 25 POWDER RESPIRATORY (INHALATION) at 08:06

## 2024-06-03 RX ADMIN — COLCHICINE 0.6 MG: 0.6 TABLET, FILM COATED ORAL at 09:06

## 2024-06-03 RX ADMIN — DIBASIC SODIUM PHOSPHATE, MONOBASIC POTASSIUM PHOSPHATE AND MONOBASIC SODIUM PHOSPHATE 2 TABLET: 852; 155; 130 TABLET ORAL at 10:06

## 2024-06-03 RX ADMIN — ATORVASTATIN CALCIUM 80 MG: 40 TABLET, FILM COATED ORAL at 09:06

## 2024-06-03 NOTE — DISCHARGE INSTRUCTIONS
Speak w/PCP about resuming aspirin at a later date.     In the case of worsening abdominal pain w/distension or grossly bloody stools or black stools- report to ED for further evaluation.     Monitor BP closely- may need to add back antihypertensives if BP remains >130/80mmHg.

## 2024-06-03 NOTE — PLAN OF CARE
CHW met with patient/family at bedside. Patient experience rounding completed and reviewed the following.     Do you know your discharge plan? Yes,SNF    Have you discussed your needs and preferences with your SW/CM? Yes     If you are discharging home, do you have help at home? Yes     Do you think you will need help additional at home at discharge? No     Do you currently have difficulty keeping up with bills, affording medicine or buying food?  No    Assigned SW/CM notified of any patient/family needs or concerns. Appropriate resources provided to address patient's needs.      Jovi Sutherland NICKOLAS   Case Management

## 2024-06-03 NOTE — PLAN OF CARE
NURSING HOME ORDERS    06/03/2024  [facility]  [facility] - STEPDOWN FLEX ([place])  [address] [place]  [facility] [address]  Dept: [phone]  Loc: [phone]     Admit to Nursing Home:      Diagnoses:  Active Hospital Problems    Diagnosis  POA    *Acute lower GI bleeding [K92.2]  Yes    Localized swelling of left lower extremity [R22.42]  No     Priority: 2     Thrombocytosis [D75.839]  No    Abrasion [T14.8XXA]  Yes    Left leg pain [M79.605]  No    Incarcerated inguinal hernia [K40.30]  No    Ischemia, bowel [K55.9]  No    History Situational (ie Stress Induced) syncope ([facility] admission 12-25-23 [R55]  Yes    (HFpEF) heart failure with preserved ejection fraction [I50.30]  Yes    Pulmonary emphysema [J43.9]  Yes     Chronic    Acute hypoxic on chronic hypercapnic respiratory failure [J96.01, J96.12]  Yes    Gout [M10.9]  Yes     Chronic    Impaired mobility [Z74.09]  Yes    Acute blood loss anemia [D62]  Yes    Leukocytosis [D72.829]  Yes    Hypertension [I10]  Yes     Chronic    Hypokalemia [E87.6]  Yes    Bilateral carotid artery stenosis [I65.23]  Yes     S/p R CCA stent due to complication from cerebral angiography        Resolved Hospital Problems    Diagnosis Date Resolved POA    Ileus [K56.7] 05/28/2024 No       Patient is homebound due to:  Acute lower GI bleeding    Allergies:Review of patient's allergies indicates:  No Known Allergies    Vitals:  Routine    Diet: regular diet    Activities:   Activity as tolerated    Goals of Care Treatment Preferences:  Code Status: Full Code      Labs:  cbc, cmp qweek x2 weeks    Nursing Precautions:  Fall and Pressure ulcer prevention    Consults:   PT to evaluate and treat- 5 times a week, OT to evaluate and treat- 5 times a week, and Wound Care     Miscellaneous Care: Routine Skin for Bedridden Patients:  Apply moisture barrier cream to all  Wound Care: yes:  moisture associated dermatitis sacral spine #1  - Sacrum and buttocks:  Bedside nursing to cleanse with soap and water, pat dry and apply triad BID and PRN; no diapers nor dressings              Home oxygen use of 2L- but patient currently on room air    Medications: Discontinue all previous medication orders, if any. See new list below.    *If w/persistent LLE swelling, but improved since initiation of HCTZ, can consider further up-titration of HCTZ if blood pressure tolerates.        Medication List        START taking these medications      LIDOcaine 4 % cream  Commonly known as: LMX  Apply topically 2 (two) times daily as needed (foot pain).     melatonin 3 mg tablet  Commonly known as: MELATIN  Take 2 tablets (6 mg total) by mouth nightly as needed for Insomnia.     ondansetron 4 MG Tbdl  Commonly known as: ZOFRAN-ODT  Take 1 tablet (4 mg total) by mouth every 6 (six) hours as needed (nausea/vomiting).            CHANGE how you take these medications      hydroCHLOROthiazide 12.5 MG Tab  Commonly known as: HYDRODIURIL  Take 1 tablet (12.5 mg total) by mouth once daily.  What changed:   medication strength  how much to take     pantoprazole 40 MG tablet  Commonly known as: PROTONIX  Take 1 tablet (40 mg total) by mouth 2 (two) times daily.  What changed: when to take this            CONTINUE taking these medications      acetaminophen 650 MG Tbsr  Commonly known as: TYLENOL  Take 1 tablet (650 mg total) by mouth every 6 to 8 hours as needed (pain (mild-moderate)).     allopurinoL 100 MG tablet  Commonly known as: ZYLOPRIM  Take 2 tablets (200 mg total) by mouth once daily.     amLODIPine 5 MG tablet  Commonly known as: NORVASC  Take 1 tablet (5 mg total) by mouth once daily.     atorvastatin 80 MG tablet  Commonly known as: LIPITOR  TAKE 1 TABLET BY MOUTH EVERY DAY     celecoxib 200 MG capsule  Commonly known as: CeleBREX  Take 1 capsule (200 mg total) by mouth daily as needed for Pain.     colchicine 0.6 mg tablet  Commonly known as: COLCRYS  Take 1 tablet (0.6 mg total) by mouth  "once daily. As needed for gout     cyanocobalamin 1,000 mcg/mL injection  Inject 1mL intramuscularly once weekly.     fluticasone furoate-vilanteroL 100-25 mcg/dose diskus inhaler  Commonly known as: BREO  Inhale 1 puff into the lungs once daily. Controller     fluticasone propionate 50 mcg/actuation nasal spray  Commonly known as: FLONASE  SPRAY 2 pumps INTO EACH NOSTRIL ONCE DAILY     furosemide 20 MG tablet  Commonly known as: LASIX  Take 1 tablet (20 mg total) by mouth daily as needed (Leg swelling, SOB, Weight gain 3lb in 1 day or 5 lbs in 2 days.).     montelukast 10 mg tablet  Commonly known as: SINGULAIR  TAKE 1 TABLET BY MOUTH EVERY DAY IN THE EVENING     needle (disp) 30 gauge 30 gauge x 1/2" Ndle  Use a new needle once, use new needle daily for the first week, then a new needle once weekly for 8 weeks.     ORTHOVISC 30 mg/2 mL  Generic drug: sodium hyaluronate (orthovisc)     QUEtiapine 50 MG tablet  Commonly known as: SEROQUEL  Take 1 tablet (50 mg total) by mouth every evening.     SALINE MIST NASL  Apply to each nostril daily for dryness     SALINE NASAL (ALOE VERA) Gel  Generic drug: sodium chloride-aloe vera  Apply to each nostril daily for dryness     SPIRIVA RESPIMAT 2.5 mcg/actuation inhaler  Generic drug: tiotropium bromide  INHALE 2 PUFFS INTO THE LUNGS ONCE DAILY. CONTROLLER     SYRINGE 3CC/22GX1" 3 mL 22 gauge x 1" Syrg  Generic drug: syringe with needle  1 mL by Misc.(Non-Drug; Combo Route) route every 30 days.     VENOFER 100 mg iron/5 mL injection  Generic drug: iron sucrose            STOP taking these medications      aspirin 81 MG EC tablet  Commonly known as: ECOTRIN     bacitracin 500 unit/gram ointment     carvediloL 6.25 MG tablet  Commonly known as: COREG     COMIRNATY 2023-24 (12Y UP)(PF) 30 mcg/0.3 mL inection  Generic drug: COVID mif89-12(12up)(raxt)(PF)     FLUAD QUAD 2023-24(65Y UP)(PF) 60 mcg (15 mcg x 4)/0.5 mL Syrg  Generic drug: flu vac 2023 [id number](PF)     PRENATAL " MULTI ORAL                Immunizations Administered as of 6/3/2024       Name Date Dose VIS Date Route Exp Date    COVID-19, MRNA, LN-S, PF (Pfizer) (Purple Cap) 8/23/2021  3:30 PM 0.3 mL 12/12/2020 Intramuscular 10/31/2021    Site: Left deltoid     Given By: Christi Castillo, RN     : [employer]     Lot: [id number]     COVID-19, MRNA, LN-S, PF (Pfizer) (Purple Cap) 1/30/2021  9:56 AM 0.3 mL 12/12/2020 Intramuscular 5/31/2021    Site: Right deltoid     Given By: Medina Childers LPN     : [employer]     Lot: [id number]     COVID-19, MRNA, LN-S, PF (Pfizer) (Purple Cap) 1/9/2021  9:53 AM 0.3 mL 12/12/2020 Intramuscular 3/31/2021    Site: Right deltoid     Given By: Hina Toro LPN     : [employer]     Lot: [id number]               Some patients may experience side effects after vaccination.  These may include fever, headache, muscle or joint aches.  Most symptoms resolve with 24-48 hours and do not require urgent medical evaluation unless they persist for more than 72 hours or symptoms are concerning for an unrelated medical condition.              _________________________________  Lexy Olivares MD  06/03/2024    Estimated Blood Loss (Cc): minimal

## 2024-06-03 NOTE — DISCHARGE SUMMARY
"[facility] - [facility] Flex ([place] [address])  [facility]  Discharge Summary      Patient Name: Jessica Floyd  MRN: [id number]  [facility]: [id number]  Patient Class: IP- Inpatient  Admission Date: 5/14/2024  Hospital Length of Stay: 20 days  Discharge Date and Time: No discharge date for patient encounter.  Attending Physician: Lexy Olivares MD   Discharging Provider: Lexy Olivares MD  Primary Care Provider: Effie Olmedo MD  Hospital Medicine Team[place] [facility] Lexy Olivares MD  Primary Care Team: [facility]    HPI:   Mrs. Floyd is a 73 year old female with PMH notable for COPD on home 2L NC, pulm HTN, R carotid stent on ASA, HFpEF, anemia, and ERIK on infusions who presented [place] [facility] [place] with the melena.  is at bedside and reports that the patient had an episode of diarrhea and lethargy yesterday. She reports that it felt as if "she was going to pass out", so she was using her walker to get around. Patient's  reports that this has never happened before. Patient denies excessive OTC NSAID use. She reports that she drinks 2 wine glasses a day. This morning when the patient woke up she felt very lethargic. Patient went to use the bathroom, and  noted bright red stool in the bowl. Patient reported a pre syncopal episode and felt lightheaded. She admits to weakness, SOB, light-headedness, hematochezia, and pre syncope. Patient denies nausea, vomiting, hematemesis, falls, confusion, chest pain, urinary changes, and fevers. Patient reports taking aspirin daily but no DOAC.      Hemoglobin 5.9 at presentation to ED (baseline ~10). GI and IR consulted given concern for active bleed seen on CTA. In the emergency department she was given 1L IVF and a 80 mg IV protonix bolus. Critical care medicine. consulted for GIB. CTA in the Ed revealed "Acute gastrointestinal bleed at the level of the cecum". IR planning for embolization today. Patient is also receiving 2 pRBC due to acute anemia. "     Procedure(s) (LRB):  LAPAROSCOPY, DIAGNOSTIC (N/A)  LAPAROTOMY, EXPLORATORY (N/A)  EXCISION, SMALL INTESTINE (N/A)  REPAIR, HERNIA, INGUINAL (Bilateral)      Hospital Course:   MICU Course:  Determined to have an active bleed at the level of the cecum s/p embolization with IR. Post operatively she was noted to have increasing abdominal pain and underwent ex lap with general surgery for incarcerated inguinal hernia on 5/17. She remained stable postoperatively. Patient did require intermittent precedex for severe delirium/agitation but was able to be weaned off. Hgb remained stable, tolerating regular diet. She was deemed stable for stepdown to  05/22/2024.     HM Course:  On 5/23/24, patient had significant clinical decline and found to have bowel obstruction, respiratory failure and a fall in hemoglobin 2/2 GIB.  She was transferred back to ICU.    MICU Course:   NG tube placed, and general surgery consulted.  During stay in ICU, patient had improvement, NG tube was removed, no further reports of bleed and hemoglobin stable. She was deemed stable to step back down to .      Course:  Initially, [facility] accepted patient, but insurance denied. Patient was awaiting SNF placement. Staples removed. She had normalization of bowel movements. HCTZ added back w/LE edema. She was found to be accepted into SNF and deemed stable for discharge.     Pt deemed appropriate for discharge. Plan discussed with pt, who was agreeable and amenable; medications were discussed and reviewed, outpatient follow-up scheduled, ER precautions were given, all questions were answered to the pt's satisfaction, and Mrs. Floyd was subsequently discharged.    Physical Exam  Gen: in NAD, appears stated age  Neuro: AAOx3, motor, sensory, and strength grossly intact BL  HEENT: NTNC, EOMI, PERRL, MMM  CVS: RRR, no m/r/g; S1/S2 auscultated with no S3 or S4; capillary refill < 2 sec  Resp: lungs CTAB, no w/r/r; no belabored breathing or accessory  muscle use appreciated   Abd: well-healing vertical midline incision, BS+ in all 4 quadrants; NTND, soft to palpation; no organomegaly appreciated   Extrem: pulses full, equal, and regular over all 4 extremities; no UE or LE edema         Goals of Care Treatment Preferences:  Code Status: Full Code      Consults:   Consults (From admission, onward)          Status Ordering Provider     Inpatient consult to Midline team  Once        Provider:  (Not yet assigned)    Completed JAKE LEE     Inpatient consult to Critical Care Medicine  Once        Provider:  (Not yet assigned)    Completed JOVANA HARRIS     Inpatient consult to Skin Integrity  Practitioner  Once        Provider:  (Not yet assigned)    Completed KAR MIGUEL     Inpatient consult to Physical Medicine Rehab  Once        Provider:  (Not yet assigned)    Completed JAKE LEE     Inpatient consult to Midline team  Once        Provider:  (Not yet assigned)    Completed AMARJIT HONG     Inpatient consult to General Surgery  Once        Provider:  (Not yet assigned)    Completed GETACHEW CHERY     Inpatient consult to Critical Care Medicine  Once        Provider:  (Not yet assigned)    Completed FREDRICK BARAHONA     Inpatient consult to Interventional Radiology  Once        Provider:  (Not yet assigned)    Completed FREDRICK BARAHONA     Inpatient consult to Gastroenterology  Once        Provider:  (Not yet assigned)    Completed FREDRICK BARAHONA            Final Active Diagnoses:    Diagnosis Date Noted POA    PRINCIPAL PROBLEM:  Acute lower GI bleeding [K92.2] 05/14/2024 Yes    Localized swelling of left lower extremity [R22.42] 06/01/2024 No    Thrombocytosis [D75.839] 06/01/2024 No    Abrasion [T14.8XXA] 05/22/2024 Yes    Left leg pain [M79.605] 05/20/2024 No    Incarcerated inguinal hernia [K40.30] 05/18/2024 No    Ischemia, bowel [K55.9] 05/17/2024 No    History Situational (ie Stress Induced) syncope ([facility] 12-25-23 [R55]  04/20/2024 Yes    (HFpEF) heart failure with preserved ejection fraction [I50.30] 03/26/2024 Yes    Pulmonary emphysema [J43.9] 11/16/2023 Yes     Chronic    Acute hypoxic on chronic hypercapnic respiratory failure [J96.01, J96.12] 10/30/2023 Yes    Gout [M10.9] 10/16/2023 Yes     Chronic    Impaired mobility [Z74.09] 08/07/2023 Yes    Acute blood loss anemia [D62] 05/29/2023 Yes    Leukocytosis [D72.829] 05/27/2023 Yes    Hypertension [I10]  Yes     Chronic    Hypokalemia [E87.6] 05/26/2017 Yes    Bilateral carotid artery stenosis [I65.23] 08/24/2016 Yes      Problems Resolved During this Admission:    Diagnosis Date Noted Date Resolved POA    Ileus [K56.7] 05/17/2024 05/28/2024 No       Discharged Condition: stable    Disposition: Skilled Nursing Facility    Follow Up:   Follow-up Information       Ruben Oscar MD Follow up.    Specialty: General Surgery  Why: As needed  Contact information:  [address] [facility] [address]  [phone]                           Patient Instructions:      Ambulatory referral/consult to Neurology   Standing Status: Future   Referral Priority: Routine Referral Type: Consultation   Referral Reason: Specialty Services Required   Requested Specialty: Neurology   Number of Visits Requested: 1     Diet Adult Regular     Notify your health care provider if you experience any of the following:  persistent nausea and vomiting or diarrhea     Notify your health care provider if you experience any of the following:  severe uncontrolled pain     Notify your health care provider if you experience any of the following:  difficulty breathing or increased cough     Activity as tolerated       Significant Diagnostic Studies: Labs: CMP   Recent Labs   Lab 06/02/24  1023 06/03/24  0326    136   K 4.6 4.3    108   CO2 20* 24   GLU 80 78   BUN 9 8   CREATININE 0.7 0.7   CALCIUM 7.9* 7.7*   PROT 5.7* 5.2*   ALBUMIN 2.0* 1.8*   BILITOT 0.4 0.3   ALKPHOS 97 84   AST 19 16    ALT 18 16   ANIONGAP 9 4*    and CBC   Recent Labs   Lab 06/02/24  1023 06/03/24  0326   WBC 9.31 8.03   HGB 8.9* 8.2*   HCT 28.8* 27.3*   * 620*       Pending Diagnostic Studies:       Procedure Component Value Units Date/Time    CBC auto differential [[id number]] Collected: 05/23/24 0925    Order Status: Sent Lab Status: No result     Specimen: Blood     Lactic acid, plasma [[id number]] Collected: 05/23/24 0925    Order Status: Sent Lab Status: No result     Specimen: Blood            Medications:  Reconciled Home Medications:      Medication List        START taking these medications      LIDOcaine 4 % cream  Commonly known as: LMX  Apply topically 2 (two) times daily as needed (foot pain).     melatonin 3 mg tablet  Commonly known as: MELATIN  Take 2 tablets (6 mg total) by mouth nightly as needed for Insomnia.     ondansetron 4 MG Tbdl  Commonly known as: ZOFRAN-ODT  Take 1 tablet (4 mg total) by mouth every 6 (six) hours as needed (nausea/vomiting).            CHANGE how you take these medications      hydroCHLOROthiazide 12.5 MG Tab  Commonly known as: HYDRODIURIL  Take 1 tablet (12.5 mg total) by mouth once daily.  What changed:   medication strength  how much to take     pantoprazole 40 MG tablet  Commonly known as: PROTONIX  Take 1 tablet (40 mg total) by mouth 2 (two) times daily.  What changed: when to take this            CONTINUE taking these medications      acetaminophen 650 MG Tbsr  Commonly known as: TYLENOL  Take 1 tablet (650 mg total) by mouth every 6 to 8 hours as needed (pain (mild-moderate)).     allopurinoL 100 MG tablet  Commonly known as: ZYLOPRIM  Take 2 tablets (200 mg total) by mouth once daily.     amLODIPine 5 MG tablet  Commonly known as: NORVASC  Take 1 tablet (5 mg total) by mouth once daily.     atorvastatin 80 MG tablet  Commonly known as: LIPITOR  TAKE 1 TABLET BY MOUTH EVERY DAY     celecoxib 200 MG capsule  Commonly known as: CeleBREX  Take 1 capsule (200 mg total) by  "mouth daily as needed for Pain.     colchicine 0.6 mg tablet  Commonly known as: COLCRYS  Take 1 tablet (0.6 mg total) by mouth once daily. As needed for gout     cyanocobalamin 1,000 mcg/mL injection  Inject 1mL intramuscularly once weekly.     fluticasone furoate-vilanteroL 100-25 mcg/dose diskus inhaler  Commonly known as: BREO  Inhale 1 puff into the lungs once daily. Controller     fluticasone propionate 50 mcg/actuation nasal spray  Commonly known as: FLONASE  SPRAY 2 pumps INTO EACH NOSTRIL ONCE DAILY     furosemide 20 MG tablet  Commonly known as: LASIX  Take 1 tablet (20 mg total) by mouth daily as needed (Leg swelling, SOB, Weight gain 3lb in 1 day or 5 lbs in 2 days.).     montelukast 10 mg tablet  Commonly known as: SINGULAIR  TAKE 1 TABLET BY MOUTH EVERY DAY IN THE EVENING     needle (disp) 30 gauge 30 gauge x 1/2" Ndle  Use a new needle once, use new needle daily for the first week, then a new needle once weekly for 8 weeks.     ORTHOVISC 30 mg/2 mL  Generic drug: sodium hyaluronate (orthovisc)     QUEtiapine 50 MG tablet  Commonly known as: SEROQUEL  Take 1 tablet (50 mg total) by mouth every evening.     SALINE MIST NASL  Apply to each nostril daily for dryness     SALINE NASAL (ALOE VERA) Gel  Generic drug: sodium chloride-aloe vera  Apply to each nostril daily for dryness     SPIRIVA RESPIMAT 2.5 mcg/actuation inhaler  Generic drug: tiotropium bromide  INHALE 2 PUFFS INTO THE LUNGS ONCE DAILY. CONTROLLER     SYRINGE 3CC/22GX1" 3 mL 22 gauge x 1" Syrg  Generic drug: syringe with needle  1 mL by Misc.(Non-Drug; Combo Route) route every 30 days.     VENOFER 100 mg iron/5 mL injection  Generic drug: iron sucrose            STOP taking these medications      aspirin 81 MG EC tablet  Commonly known as: ECOTRIN     bacitracin 500 unit/gram ointment     carvediloL 6.25 MG tablet  Commonly known as: COREG     COMIRNATY 2023-24 (12Y UP)(PF) 30 mcg/0.3 mL inection  Generic drug: COVID " clu09-97(12up)(raxt)(PF)     FLUAD QUAD 2023-24(65Y UP)(PF) 60 mcg (15 mcg x 4)/0.5 mL Syrg  Generic drug: flu vac 2023 [id number]-[id number](PF)     PRENATAL MULTI ORAL              Indwelling Lines/Drains at time of discharge:   Lines/Drains/Airways       Drain  Duration             Female External Urinary Catheter w/ Suction 05/30/24 1630 3 days                    Time spent on the discharge of patient: 45 minutes           Lexy Olivares MD  Department of Hospital Medicine  [facility] - Stepdown Flex ([place]

## 2024-06-03 NOTE — PROGRESS NOTES
"[facility] ([facility])  Adult Nutrition  Progress Note    SUMMARY       Recommendations    Continue Regular diet.  RD to monitor & follow-up.    Goals: Meet % EEN, EPN by RD f/u date  Nutrition Goal Status: goal met  Communication of RD Recs: reviewed with RN    Assessment and Plan    No nutrition dx at this time.     Reason for Assessment    Reason For Assessment: RD follow-up  Diagnosis: other (see comments) (GIB)  Relevant Medical History: COPD, HF  Interdisciplinary Rounds: did not attend    General Information Comments: Pt scheduled to discharge today - continues to tolerate diet w/ good appetite (per RN documentation).  Nutrition Discharge Planning: Adequate PO intake    Nutrition/Diet History    Patient Reported Diet/Restrictions/Preferences: general  Spiritual, Cultural Beliefs, Congregation Practices, Values that Affect Care: no  Factors Affecting Nutritional Intake: None identified at this time    Anthropometrics    Temp: 98.1 °F (36.7 °C)  Height Method: Stated  Height: 5' 3" (160 cm)  Height (inches): 63 in  Weight Method: Bed Scale  Weight: 52.6 kg (115 lb 15.4 oz)  Weight (lb): 115.96 lb  Ideal Body Weight (IBW), Female: 115 lb  % Ideal Body Weight, Female (lb): 100.83 %  BMI (Calculated): 20.5  BMI Grade: 18.5-24.9 - normal    Lab/Procedures/Meds    Pertinent Labs Reviewed: reviewed  Pertinent Medications Reviewed: reviewed    Estimated/Assessed Needs    Weight Used For Calorie Calculations: 52.6 kg (115 lb 15.4 oz)    Energy Calorie Requirements (kcal): 1578 kcal/d  Energy Need Method: Kcal/kg (30 kcal/kg)    Protein Requirements: 53-63 g/d (1-1.2 g/kg)  Weight Used For Protein Calculations: 52.6 kg (115 lb 15.4 oz)    Estimated Fluid Requirement Method: other (see comments) (Per MD)  RDA Method (mL): 1578    Nutrition Prescription Ordered    Current Diet Order: Regular    Evaluation of Received Nutrient/Fluid Intake    I/O: +5.9L since 5/20    Comments: LBM: 6/2    Tolerance: " tolerating    Nutrition Risk    Level of Risk/Frequency of Follow-up:  (1x/week)     Monitor and Evaluation    Food and Nutrient Intake: food and beverage intake, energy intake  Food and Nutrient Adminstration: diet order  Physical Activity and Function: nutrition-related ADLs and IADLs  Anthropometric Measurements: weight, weight change  Biochemical Data, Medical Tests and Procedures: glucose/endocrine profile, lipid profile, inflammatory profile, gastrointestinal profile, electrolyte and renal panel  Nutrition-Focused Physical Findings: overall appearance     Nutrition Follow-Up    RD Follow-up?: Yes

## 2024-06-03 NOTE — PLAN OF CARE
[facility] ([place]-14)  Discharge Reassessment    Primary Care Provider: Effie Olmedo MD    Expected Discharge Date: 6/3/2024    Reassessment (most recent)       Discharge Reassessment - 06/03/24 1610          Discharge Reassessment    Assessment Type Discharge Planning Reassessment     Did the patient's condition or plan change since previous assessment? No     Discharge Plan discussed with: Patient;Spouse/sig other     Name(s) and Number(s) Sandie Floyd spouse [phone]     Communicated DEBBIE with patient/caregiver Yes     Discharge Plan A Skilled Nursing Facility     Discharge Plan B Home with family     DME Needed Upon Discharge  none     Transition of Care Barriers None     Why the patient remains in the hospital Requires continued medical care        Post-Acute Status    Post-Acute Authorization Placement     Post-Acute Placement Status Set-up Complete/Auth obtained     Coverage PHN     Discharge Delays None known at this time                       Discharge Plan A and Plan B have been determined by review of patient's clinical status, future medical and therapeutic needs, and coverage/benefits for post-acute care in coordination with multidisciplinary team members.    Jesenia Moran MSW, CSW

## 2024-06-03 NOTE — CARE UPDATE
"RAPID RESPONSE NURSE CHART REVIEW        Chart Reviewed: 06/03/2024, 7:38 AM    MRN: [id number]  Bed: 86519/47743 A    Dx: Acute lower GI bleeding    Jessica Floyd has a past medical history of Allergic rhinitis, Amblyopia, Carotid stenosis, Coronary atherosclerosis, Dyslipidemia, ESBL (extended spectrum beta-lactamase) producing bacteria infection, Hypertension, Nausea and vomiting, Pulmonary emphysema, SAH (subarachnoid hemorrhage), and Subarachnoid hemorrhage due to ruptured aneurysm.    Last VS: /70   Pulse 75   Temp 97.6 °F (36.4 °C) (Oral)   Resp 17   Ht 5' 3" (1.6 m)   Wt 52.6 kg (115 lb 15.4 oz)   SpO2 100%   BMI 20.54 kg/m²     24H Vital Sign Range:  Temp:  [97.4 °F (36.3 °C)-98 °F (36.7 °C)]   Pulse:  [75-92]   Resp:  [16-20]   BP: (109-139)/(62-72)   SpO2:  [96 %-100 %]     Level of Consciousness (AVPU): alert    Recent Labs     06/01/24  0847 06/02/24  1023 06/03/24  0326   WBC 10.22 9.31 8.03   HGB 9.0* 8.9* 8.2*   HCT 29.3* 28.8* 27.3*   * 670* 620*       Recent Labs     06/01/24  0417 06/02/24  1023 06/03/24  0326    136 136   K 3.6 4.6 4.3    107 108   CO2 21* 20* 24   BUN 7* 9 8   CREATININE 0.7 0.7 0.7   GLU 79 80 78   PHOS 2.7 2.5* 2.4*   MG 1.6 1.4* 1.5*     OXYGEN:  Flow (L/min) (Oxygen Therapy): 0.5    MEWS score: 1    Charge RNErma contacted for abnormal lab values reports states team ordered replacements for Mg. No additional concerns verbalized at this time. Instructed to call [phone] for further concerns or assistance.    Donna Escobar RN        "

## 2024-06-03 NOTE — PT/OT/SLP PROGRESS
"Occupational Therapy   Treatment    Name: Jessica Floyd  MRN: [id number]  Admitting Diagnosis:  Acute lower GI bleeding  17 Days Post-Op    Recommendations:     Discharge Recommendations: Moderate Intensity Therapy  Discharge Equipment Recommendations:  shower chair  Barriers to discharge:  None    Assessment:     Jessica Floyd is a 73 y.o. female with a medical diagnosis of Acute lower GI bleeding.  She presents with the following performance deficits affecting function: weakness, impaired endurance, impaired self care skills, impaired functional mobility, gait instability, impaired balance, decreased coordination, decreased safety awareness, pain. Pt agreeable to session and motivated to participate. Pt demonstrates improved activity tolerance this session as indicated by greater distances achieved with functional mobility with no LOB, SOB, or excessive fatigue noted. Pt demonstrating good safety awareness with as indicated by verbalizing and demonstrating proper hand placement for functional transfers; however, pt still limited in optimal performance of functional transfers secondary to anxiety and decreased confidence levels with functional mobility. Pt would benefit from continued skilled OT services in the acute care setting to promote self-efficacy with functional transfers, increase participation in ADL routines, and to promote return to PLOF, IND with self-care tasks, and return to least restrictive home environment.     Rehab Prognosis:  Good; patient would benefit from acute skilled OT services to address these deficits and reach maximum level of function.       Plan:     Patient to be seen 4 x/week to address the above listed problems via self-care/home management, therapeutic activities, therapeutic exercises, neuromuscular re-education  Plan of Care Expires: 06/19/24  Plan of Care Reviewed with: patient, daughter    Subjective     Chief Complaint: pain in RUE, LLE  Patient/Family Comments/goals: "I have " "all the equipment I need."  Pain/Comfort:  Pain Rating 1: 4/10  Location - Side 1: Left  Location - Orientation 1: generalized  Location 1: leg  Pain Addressed 1: Reposition, Distraction  Pain Rating Post-Intervention 1: 4/10  Location - Side 2: Right  Location - Orientation 2: generalized  Location 2: shoulder  Pain Addressed 2: Reposition, Distraction, Cessation of Activity    Objective:     Communicated with: Nursing prior to session.  Patient found up in chair with telemetry, pulse ox (continuous) upon OT entry to room.    General Precautions: Standard, fall    Orthopedic Precautions:N/A  Braces: N/A  Respiratory Status: Room air     Occupational Performance:     Bed Mobility:    Not observed secondary to pt up in chair at start of session    Functional Mobility/Transfers:  Patient completed Sit <> Stand Transfer with contact guard assistance  with  rolling walker   Patient completed Toilet Transfer Stand Pivot technique with stand by assistance with  rolling walker  Functional Mobility: Pt able to ambulate throughout room and bathroom with CGA and RW. Pt initial attempt to stand unsuccessful - verbal cueing required to encourage forward momentum for standing. Pt verbalized understanding and was successful upon second attempt to stand with rocking technique. Pt requesting stabilization of RW and arm under arm assist for safety and pt comfort. Pt able to tolerate ~200 ft of ambulation with RW and SBA throughout room and hallway with no LOB, SOB, or fatigue noted.     Activities of Daily Living:  Lower Body Dressing: modified independence for clothing management before/after toileting  Toileting: modified independence for hygiene after voiding into toilet      [facility] 6 Click ADL: 21    Treatment & Education:  Provided education on OT role, POC, and therapy goals while in the acute care setting and what to expect after DC from acute care setting. Provided education on the importance of establishing self-efficacy " with functional transfers and participation in ADL routines to decrease anxiety levels and promote independence with self-care routines. Pt and daughter verbalized understanding.     Patient left up in chair with all lines intact, call button in reach, and daughter present    GOALS:   Multidisciplinary Problems       Occupational Therapy Goals          Problem: Occupational Therapy    Goal Priority Disciplines Outcome Interventions   Occupational Therapy Goal     OT, PT/OT Progressing    Description: Re-eval completed 05-25 Goals to be met by: 6/8/24     Patient will increase functional independence with ADLs by performing:    UE Dressing with Stand-by Assistance.  LE Dressing with Minimal Assistance.  Grooming while standing at sink with stand by Assistance.  Toileting from toilet with Minimal Assistance for hygiene and clothing management.   Toilet transfer to toilet with stand by assist                         Time Tracking:     OT Date of Treatment: 06/03/24  OT Start Time: 1305  OT Stop Time: 1330  OT Total Time (min): 25 min    Billable Minutes:Self Care/Home Management 10 minutes  Therapeutic Activity 15 minutes    OT/OCTAVIO: OT     Number of OCTAVIO visits since last OT visit: 0    6/3/2024

## 2024-06-03 NOTE — PT/OT/SLP PROGRESS
"Physical Therapy Treatment    Patient Name:  Jessica Floyd   MRN:  [id number]    Recommendations:     Discharge Recommendations: High Intensity Therapy  Discharge Equipment Recommendations: none  Barriers to discharge: None    Assessment:     Jessica Floyd is a 73 y.o. female admitted with a medical diagnosis of Acute lower GI bleeding.  She presents with the following impairments/functional limitations: weakness, impaired endurance, impaired self care skills, impaired functional mobility, gait instability, impaired balance, decreased lower extremity function.    Pt agreeable to BLE therex while seated in bedside chair this session. Pt tolerates session well with emphasis on therex.  Pt will continue to benefit from skilled therapy services.    Rehab Prognosis: Good; patient would benefit from acute skilled PT services to address these deficits and reach maximum level of function.    Recent Surgery: Procedure(s) (LRB):  LAPAROSCOPY, DIAGNOSTIC (N/A)  LAPAROTOMY, EXPLORATORY (N/A)  EXCISION, SMALL INTESTINE (N/A)  REPAIR, HERNIA, INGUINAL (Bilateral) 17 Days Post-Op    Plan:     During this hospitalization, patient to be seen 4 x/week to address the identified rehab impairments via gait training, therapeutic activities, therapeutic exercises and progress toward the following goals:    Plan of Care Expires:  06/23/24    Subjective     Chief Complaint: L hip pain  Patient/Family Comments/goals: "I guess I can do a little something"  Pain/Comfort:  Pain Rating 1: other (see comments) (pain not rated)  Location - Side 1: Left  Location - Orientation 1: generalized  Location 1: hip  Pain Addressed 1: Reposition, Distraction, Cessation of Activity  Pain Rating Post-Intervention 1: other (see comments) (pain not rated)      Objective:     Communicated with RN (Niyah)  prior to session.  Patient found up in chair with telemetry, pulse ox (continuous), daughter present upon PTA entry to room.     General Precautions: Standard, " fall  Orthopedic Precautions: N/A  Braces: N/A  Respiratory Status: Room air     Functional Mobility:  Pt declines functional mobility at this time    AM-PAC 6 CLICK MOBILITY  Turning over in bed (including adjusting bedclothes, sheets and blankets)?: 3  Sitting down on and standing up from a chair with arms (e.g., wheelchair, bedside commode, etc.): 3  Moving from lying on back to sitting on the side of the bed?: 3  Moving to and from a bed to a chair (including a wheelchair)?: 3  Need to walk in hospital room?: 3  Climbing 3-5 steps with a railing?: 2  Basic Mobility Total Score: 17       Treatment & Education:  Patient provided with daily orientation and goals of this PT session.     Pt performs the following BLE therex while seated in Bedside chair:  X20 AP, LAQ, Hip flexion  PTA provided skilled instruction on good form and technique throughout.     Pt educated to call for assistance and to transfer with hospital staff only.  Also, pt was educated on the effects of prolonged immobility and the importance of performing OOB activity and exercises to promote healing and reduce recovery time.    Patient left up in chair with all lines intact, call button in reach, and RN notified.    GOALS:   Multidisciplinary Problems       Physical Therapy Goals          Problem: Physical Therapy    Goal Priority Disciplines Outcome Goal Variances Interventions   Physical Therapy Goal     PT, PT/OT Progressing     Description: Goals to be met by: 2024    Patient will increase functional independence with mobility by performin. Supine to sit with Minimal Assistance -met   2. Sit to stand transfer with modified Independent using RW  3. Gait  x 50 feet with Contact Guard Assistance using Rolling Walker.   4. Ascend/descend 3 stair with bilateral Handrails Contact Guard Assistance using RW.   5. Pt perform AROM there exer LLE x 15 reps to increase strength for increased mobility.                          Time  Tracking:     PT Received On: 06/03/24  PT Start Time: 1349     PT Stop Time: 1357  PT Total Time (min): 8 min     Billable Minutes: Therapeutic Exercise 8    Treatment Type: Treatment  PT/PTA: PTA     Number of PTA visits since last PT visit: 4     06/03/2024

## 2024-06-04 NOTE — PLAN OF CARE
[facility] - Stepdown Flex ([place]-14)  Discharge Final Note    Primary Care Provider: Effie Olmedo MD    Expected Discharge Date: 6/3/2024    Patient discharged to [facility] via wheelchair van transportation.     Patient's bedside nurse and family notified of the above.    Discharge Plan A and Plan B have been determined by review of patient's clinical status, future medical and therapeutic needs, and coverage/benefits for post-acute care in coordination with multidisciplinary team members.        Final Discharge Note (most recent)       Final Note - 06/04/24 0947          Final Note    Assessment Type Final Discharge Note     Anticipated Discharge Disposition Skilled Nursing Facility     What phone number can be called within the next 1-3 days to see how you are doing after discharge? [phone]        Post-Acute Status    Post-Acute Authorization Placement     Coverage PHN     Discharge Delays None known at this time                     Important Message from Medicare  Important Message from Medicare regarding Discharge Appeal Rights: Given to patient/caregiver, Explained to patient/caregiver, Signed/date by patient/caregiver     Date IMM was signed: 06/03/24  Time IMM was signed: 1128    Contact Info       Ruben Oscar MD   Specialty: General Surgery    [address] [facility] [address] [address]   Phone: [phone]       Next Steps: Follow up    Instructions: As needed            Future Appointments   Date Time Provider Department Center   6/17/2024  9:00 AM [facility] DEXA1 [facility] BMD [facility]   7/2/2024  9:00 AM PULMONARY FUNCTION [facility] PULMLAB [facility]   7/2/2024  9:15 AM PULMONARY FUNCTION [facility] PULMLAB [facility]   7/2/2024  9:30 AM PULMONARY FUNCTION [facility] PULMLAB [facility]   7/2/2024 10:00 AM Hina Roberts DO [facility] LUNGTX [facility]   7/8/2024  2:30 PM Post, Danis RUBIO MD [facility] PSYCH [facility]   7/16/2024 10:15 AM LAB, SAME DAY [facility] LABDRAW [facility]   7/23/2024 10:30 AM Ольга  Agata PAREKH MD [facility] HEM ONC [facility]   8/13/2024 10:00 AM Effie Olmedo MD [facility] PRICARE [place]   9/10/2024 10:00 AM Michael Lal MD [facility] [id number] [facility]       CHW scheduled post-discharge follow-up appointment and information added to AVS.     Jesenia Moran MSW, CSW

## 2024-06-07 DIAGNOSIS — M17.12 PRIMARY OSTEOARTHRITIS OF LEFT KNEE: Primary | ICD-10-CM

## 2024-06-10 DIAGNOSIS — E78.5 DYSLIPIDEMIA: ICD-10-CM

## 2024-06-10 RX ORDER — ATORVASTATIN CALCIUM 80 MG/1
TABLET, FILM COATED ORAL
Qty: 90 TABLET | Refills: 3 | Status: SHIPPED | OUTPATIENT
Start: 2024-06-10

## 2024-06-10 NOTE — TELEPHONE ENCOUNTER
No care due was identified.  [facility] Embedded Care Due Messages. Reference number: [id number].   6/10/2024 9:09:36 AM CDT

## 2024-06-10 NOTE — TELEPHONE ENCOUNTER
Refill Encounter    PCP Visits: Recent Visits  Date Type Provider Dept   11/27/23 Office Visit Effie Olmedo MD [facility]   Showing recent visits within past 360 days and meeting all other requirements  Future Appointments  Date Type Provider Dept   08/13/24 Appointment Effie Olmedo MD [facility]   Showing future appointments within next 720 days and meeting all other requirements     Last 3 Blood Pressure:   BP Readings from Last 3 Encounters:   06/03/24 (!) 120/59   05/13/24 128/72   05/10/24 108/63     Preferred Pharmacy:   [facility]/pharmacy #[id number] - [place], [place] - [address]  [address] [address]  Phone: [phone] Fax: [phone]    [facility] - [place], [place] - [address]  [address] [address]  Phone: [phone] Fax: [phone]    Requested RX:  Requested Prescriptions     Pending Prescriptions Disp Refills    atorvastatin (LIPITOR) 80 MG tablet [Pharmacy Med Name: ATORVASTATIN 80 MG TABLET] 90 tablet 3     Sig: TAKE 1 TABLET BY MOUTH EVERY DAY      RX Route: Normal

## 2024-06-11 ENCOUNTER — PATIENT MESSAGE (OUTPATIENT)
Dept: PRIMARY CARE CLINIC | Facility: CLINIC | Age: 74
End: 2024-06-11
Payer: MEDICARE

## 2024-06-11 ENCOUNTER — TELEPHONE (OUTPATIENT)
Dept: PRIMARY CARE CLINIC | Facility: CLINIC | Age: 74
End: 2024-06-11
Payer: MEDICARE

## 2024-06-11 DIAGNOSIS — R06.02 SOB (SHORTNESS OF BREATH) ON EXERTION: ICD-10-CM

## 2024-06-11 RX ORDER — TIOTROPIUM BROMIDE INHALATION SPRAY 3.12 UG/1
2 SPRAY, METERED RESPIRATORY (INHALATION) DAILY
Qty: 4 G | Refills: 2 | Status: SHIPPED | OUTPATIENT
Start: 2024-06-11

## 2024-06-11 NOTE — TELEPHONE ENCOUNTER
Mariam Hebert Staff  Caller: Unspecified (Today,  1:38 PM)  Type: Patient Call Back    Who called: Kd Cardenas home health nurse    What is the request in detail: calling to inform provider pt has a wound on the back her calf, needs to connect with provider to get orders for care    Can the clinic reply by [facility]?no    Would the patient rather a call back or a response via [facility]? call    Best call back number: [phone], Theresa    Additional Information:

## 2024-06-11 NOTE — TELEPHONE ENCOUNTER
No care due was identified.  [facility] Embedded Care Due Messages. Reference number: [id number].   6/11/2024 7:58:04 AM CDT

## 2024-06-11 NOTE — TELEPHONE ENCOUNTER
Spoke with Theresa from [facility] health nurse. Theresa states that she was given the following order to deal with the patients wound:     Clean with normal saline  Pat dry  Apply iodine swabs to the wound bed  Put Mepilex dressing over it  Change dressing two times a week.    Theresa would like to know if these orders are okay.

## 2024-06-11 NOTE — TELEPHONE ENCOUNTER
Pt would also like some prescribed eye drops for conjunctivitis, states [facility] did not send in the prescription.

## 2024-06-12 ENCOUNTER — OFFICE VISIT (OUTPATIENT)
Dept: SURGERY | Facility: CLINIC | Age: 74
End: 2024-06-12
Payer: MEDICARE

## 2024-06-12 VITALS
DIASTOLIC BLOOD PRESSURE: 51 MMHG | SYSTOLIC BLOOD PRESSURE: 85 MMHG | HEIGHT: 63 IN | BODY MASS INDEX: 20.54 KG/M2 | WEIGHT: 115.94 LBS | OXYGEN SATURATION: 94 % | HEART RATE: 106 BPM

## 2024-06-12 DIAGNOSIS — Z98.890 S/P EXPLORATORY LAPAROTOMY: Primary | ICD-10-CM

## 2024-06-12 DIAGNOSIS — L03.116 CELLULITIS OF LEFT LOWER EXTREMITY: ICD-10-CM

## 2024-06-12 PROCEDURE — 3288F FALL RISK ASSESSMENT DOCD: CPT | Mod: CPTII,NTX,S$GLB, | Performed by: STUDENT IN AN ORGANIZED HEALTH CARE EDUCATION/TRAINING PROGRAM

## 2024-06-12 PROCEDURE — 1159F MED LIST DOCD IN RCRD: CPT | Mod: CPTII,NTX,S$GLB, | Performed by: STUDENT IN AN ORGANIZED HEALTH CARE EDUCATION/TRAINING PROGRAM

## 2024-06-12 PROCEDURE — 3078F DIAST BP <80 MM HG: CPT | Mod: CPTII,NTX,S$GLB, | Performed by: STUDENT IN AN ORGANIZED HEALTH CARE EDUCATION/TRAINING PROGRAM

## 2024-06-12 PROCEDURE — 99024 POSTOP FOLLOW-UP VISIT: CPT | Mod: NTX,S$GLB,, | Performed by: STUDENT IN AN ORGANIZED HEALTH CARE EDUCATION/TRAINING PROGRAM

## 2024-06-12 PROCEDURE — 99999 PR PBB SHADOW E&M-EST. PATIENT-LVL IV: CPT | Mod: PBBFAC,TXP,, | Performed by: STUDENT IN AN ORGANIZED HEALTH CARE EDUCATION/TRAINING PROGRAM

## 2024-06-12 PROCEDURE — 1160F RVW MEDS BY RX/DR IN RCRD: CPT | Mod: CPTII,NTX,S$GLB, | Performed by: STUDENT IN AN ORGANIZED HEALTH CARE EDUCATION/TRAINING PROGRAM

## 2024-06-12 PROCEDURE — 1101F PT FALLS ASSESS-DOCD LE1/YR: CPT | Mod: CPTII,NTX,S$GLB, | Performed by: STUDENT IN AN ORGANIZED HEALTH CARE EDUCATION/TRAINING PROGRAM

## 2024-06-12 PROCEDURE — 3074F SYST BP LT 130 MM HG: CPT | Mod: CPTII,NTX,S$GLB, | Performed by: STUDENT IN AN ORGANIZED HEALTH CARE EDUCATION/TRAINING PROGRAM

## 2024-06-12 RX ORDER — SULFAMETHOXAZOLE AND TRIMETHOPRIM 800; 160 MG/1; MG/1
1 TABLET ORAL 2 TIMES DAILY
Qty: 14 TABLET | Refills: 0 | Status: SHIPPED | OUTPATIENT
Start: 2024-06-12 | End: 2024-06-19

## 2024-06-12 NOTE — PROGRESS NOTES
"  [facility]  General Surgery  Post-Operative Note    Subjective:       Jessica Floyd is a 73 year old female who presents to the clinic 3 weeks following diagnostic converted to exploratory laparotomy for incarcerated inguinal hernia, small bowel resection. She is eating a regular diet without difficulty. Bowel movements are Normal.  The patient is not having any abdominal pain.  Her hospitalization was prolonged and she required additional therapy at a skilled nursing facility. Patient also has obvious erythema and wound to left lower leg. Per patient she had noticed pain in the area after sitting in the recliner and it pushing up against her leg. She discussed this with the nursing facility and they had just been cleansing the wound. She is not currently on any antibiotics. She has noticed some progressive swelling and redness to the leg and it has started to feel warm to the touch.      Objective:      BP (!) 85/51 (BP Location: Right arm, Patient Position: Sitting, BP Method: Small (Automatic))   Pulse 106   Ht 5' 3" (1.6 m)   Wt 52.6 kg (115 lb 15.4 oz)   SpO2 (!) 94%   BMI 20.54 kg/m²     Physical Exam  Vitals reviewed.   Constitutional:       General: She is not in acute distress.     Appearance: Normal appearance.      Comments: In wheelchair, room air   HENT:      Head: Normocephalic and atraumatic.   Cardiovascular:      Rate and Rhythm: Normal rate.   Pulmonary:      Effort: Pulmonary effort is normal. No respiratory distress.   Abdominal:      General: Abdomen is flat. There is no distension.      Palpations: Abdomen is soft.      Tenderness: There is no abdominal tenderness.      Comments: Incision healing appropriately, no signs of breakdown of wound   Musculoskeletal:      Comments: Obvious erythema and swelling to left lower extremity, foot and ankle.   Wound on posterior lower leg with fibrinous tissue present, not actively draining   Skin:     General: Skin is warm and " dry.   Neurological:      General: No focal deficit present.      Mental Status: She is alert and oriented to person, place, and time.   Psychiatric:         Mood and Affect: Mood normal.         Behavior: Behavior normal.      Pathology:   Small bowel, resection:   - Benign small bowel with acute serositis, serosal fibrovascular adhesions, mucosal ischemic changes, transmural necrosis, and edema.     Assessment:     Jessica Floyd is a 73 year old female s/p exploratory laparotomy and small bowel resection for incarcerated hernia after an IR embolization. Doing well from a GI standpoint. However, now with evidence of cellulitis of LLE.     Plan:     1. Continue any current medications. Sending in bactrim for her cellulitis, wound.   2. Wound care discussed. Put in additional home health orders for wound care nurse to see  3. Follow up in one week to evaluate leg wound

## 2024-06-12 NOTE — PROGRESS NOTES
[facility]      HOME HEALTH ORDERS  FACE TO FACE ENCOUNTER    Patient Name: Jessica Floyd  YOB: 1950    PCP: Effie Olmedo MD   PCP Address: [address] [place] [address]  PCP Phone Number: [phone]  PCP Fax: [phone]    Encounter Date: 6/12/24    Admit to Home Health    Diagnoses:  There are no hospital problems to display for this patient.      Follow Up Appointments:  Future Appointments   Date Time Provider Department Center   7/2/2024  9:00 AM PULMONARY FUNCTION [facility] PULMLAB [facility]   7/2/2024  9:15 AM PULMONARY FUNCTION [facility] PUL[facility]   7/2/2024  9:30 AM PULMONARY FUNCTION [facility] PUL[facility]   7/2/2024 10:00 AM iHna Roberts DO [facility] LUNGTX [facility]   7/16/2024 10:15 AM LAB, SAME DAY [facility] LABDRAW [facility]   7/23/2024 10:30 AM Agata Rolon MD [facility] HEM ONC [facility]   8/13/2024 10:00 AM Effie Olmedo MD [facility] [place]   9/10/2024 10:00 AM Michael Lal MD [facility] [id number] [facility]       Allergies:Review of patient's allergies indicates:  No Known Allergies    Medications: Review discharge medications with patient and family and provide education.    Current Outpatient Medications   Medication Sig Dispense Refill    acetaminophen (TYLENOL) 650 MG TbSR Take 1 tablet (650 mg total) by mouth every 6 to 8 hours as needed (pain (mild-moderate)). 120 tablet 0    allopurinoL (ZYLOPRIM) 100 MG tablet Take 2 tablets (200 mg total) by mouth once daily. 180 tablet 3    amLODIPine (NORVASC) 5 MG tablet Take 1 tablet (5 mg total) by mouth once daily. 90 tablet 3    atorvastatin (LIPITOR) 80 MG tablet TAKE 1 TABLET BY MOUTH EVERY DAY 90 tablet 3    celecoxib (CELEBREX) 200 MG capsule Take 1 capsule (200 mg total) by mouth daily as needed for Pain. 30 capsule 2    colchicine (COLCRYS) 0.6 mg tablet Take 1 tablet (0.6 mg total) by mouth once daily. As needed for gout 30 tablet 11    cyanocobalamin 1,000 mcg/mL  "injection Inject 1mL intramuscularly once weekly. 30 mL 0    fluticasone furoate-vilanteroL (BREO) 100-25 mcg/dose diskus inhaler Inhale 1 puff into the lungs once daily. Controller 30 each 11    fluticasone propionate (FLONASE) 50 mcg/actuation nasal spray SPRAY 2 pumps INTO EACH NOSTRIL ONCE DAILY 16 mL 3    furosemide (LASIX) 20 MG tablet Take 1 tablet (20 mg total) by mouth daily as needed (Leg swelling, SOB, Weight gain 3lb in 1 day or 5 lbs in 2 days.). 30 tablet 11    hydroCHLOROthiazide (HYDRODIURIL) 12.5 MG Tab Take 1 tablet (12.5 mg total) by mouth once daily.      LIDOcaine (LMX) 4 % cream Apply topically 2 (two) times daily as needed (foot pain).      melatonin (MELATIN) 3 mg tablet Take 2 tablets (6 mg total) by mouth nightly as needed for Insomnia.      montelukast (SINGULAIR) 10 mg tablet TAKE 1 TABLET BY MOUTH EVERY DAY IN THE EVENING 90 tablet 6    needle, disp, 30 gauge 30 gauge x 1/2" Ndle Use a new needle once, use new needle daily for the first week, then a new needle once weekly for 8 weeks. 30 each 0    ondansetron (ZOFRAN-ODT) 4 MG TbDL Take 1 tablet (4 mg total) by mouth every 6 (six) hours as needed (nausea/vomiting).      ORTHOVISC 30 mg/2 mL       pantoprazole (PROTONIX) 40 MG tablet Take 1 tablet (40 mg total) by mouth 2 (two) times daily.      QUEtiapine (SEROQUEL) 50 MG tablet Take 1 tablet (50 mg total) by mouth every evening. 90 tablet 1    sodium chloride (SALINE MIST NASL) Apply to each nostril daily for dryness      sodium chloride-aloe vera (SALINE NASAL, ALOE VERA,) Gel Apply to each nostril daily for dryness      syringe with needle (SYRINGE 3CC/22GX1") 3 mL 22 gauge x 1" Syrg 1 mL by Misc.(Non-Drug; Combo Route) route every 30 days. 15 each 0    tiotropium bromide (SPIRIVA RESPIMAT) 2.5 mcg/actuation inhaler Inhale 2 puffs into the lungs Daily. 4 g 2    VENOFER 100 mg iron/5 mL injection        No current facility-administered medications for this visit. "     Facility-Administered Medications Ordered in Other Visits   Medication Dose Route Frequency Provider Last Rate Last Admin    mupirocin 2 % ointment   Nasal On Call Procedure Kang Diaz MD   Given at 10/25/23 1045    tranexamic acid (CYKLOKAPRON) 1,000 mg in sodium chloride 0.9 % 100 mL IVPB (MB+)  1,000 mg Intravenous Once Kang Diaz MD        tranexamic acid (CYKLOKAPRON) 1,000 mg in sodium chloride 0.9 % 100 mL IVPB (MB+)  1,000 mg Intravenous Once Kang Diaz MD         Cannot display discharge medications since this is not an admission.        I have seen and examined this patient within the last 30 days. My clinical findings that support the need for the home health skilled services and home bound status are the following:no   Weakness/numbness causing balance and gait disturbance due to Weakness/Debility and Surgery making it taxing to leave home.     Diet:   regular diet      Referrals/ Consults  Physical Therapy to evaluate and treat. Evaluate for home safety and equipment needs; Establish/upgrade home exercise program. Perform / instruct on therapeutic exercises, gait training, transfer training, and Range of Motion.  Occupational Therapy to evaluate and treat. Evaluate home environment for safety and equipment needs. Perform/Instruct on transfers, ADL training, ROM, and therapeutic exercises.      Nursing:   Agency to admit patient within 24 hours of hospital discharge unless specified on physician order or at patient request    SN to complete comprehensive assessment including routine vital signs. Instruct on disease process and s/s of complications to report to MD. Review/verify medication list sent home with the patient at time of discharge  and instruct patient/caregiver as needed. Frequency may be adjusted depending on start of care date.     Skilled nurse to perform up to 3 visits PRN for symptoms related to diagnosis    Notify MD if SBP > 160 or < 90; DBP > 90 or < 50; HR > 120  or < 50; Temp > 101; O2 < 88%    Ok to schedule additional visits based on staff availability and patient request on consecutive days within the home health episode.    When multiple disciplines ordered:    Start of Care occurs on Sunday - Wednesday schedule remaining discipline evaluations as ordered on separate consecutive days following the start of care.    Thursday SOC -schedule subsequent evaluations Friday and Monday the following week.     Friday - Saturday SOC - schedule subsequent discipline evaluations on consecutive days starting Monday of the following week.    For all post-discharge communication and subsequent orders please contact patient's primary care physician. If unable to reach primary care physician or do not receive response within 30 minutes, please contact Dr. Oscar for Wound care clarification for clinical staff order clarification        Home Health Aide:  Nursing Three times weekly and Physical Therapy Three times weekly    Wound Care Orders  yes:  Surgical Wound:  Location: Abdomen, Leg    Left leg wound:   Consult ET nurse, Leg        Apply the following to wound:  Cleanse wound with dial soap, pat dry. Apply Mepilex dressing to wound and instruct patient to change dressing daily.  Keep clean and dry. Monitor for skin changes and signs of infection including but not limited to erythema, drainage, increased warmth.      I certify that this patient is confined to her home and needs intermittent skilled nursing care and physical therapy.

## 2024-06-13 ENCOUNTER — PATIENT OUTREACH (OUTPATIENT)
Dept: ADMINISTRATIVE | Facility: CLINIC | Age: 74
End: 2024-06-13
Payer: MEDICARE

## 2024-06-13 ENCOUNTER — PATIENT MESSAGE (OUTPATIENT)
Dept: ADMINISTRATIVE | Facility: CLINIC | Age: 74
End: 2024-06-13
Payer: MEDICARE

## 2024-06-13 ENCOUNTER — PATIENT MESSAGE (OUTPATIENT)
Dept: SURGERY | Facility: CLINIC | Age: 74
End: 2024-06-13
Payer: MEDICARE

## 2024-06-13 RX ORDER — AMOXICILLIN AND CLAVULANATE POTASSIUM 875; 125 MG/1; MG/1
1 TABLET, FILM COATED ORAL EVERY 12 HOURS
COMMUNITY
Start: 2024-02-26

## 2024-06-13 RX ORDER — UMECLIDINIUM 62.5 UG/1
1 AEROSOL, POWDER ORAL
COMMUNITY
Start: 2024-06-10

## 2024-06-13 RX ORDER — FLUTICASONE PROPIONATE AND SALMETEROL XINAFOATE 115; 21 UG/1; UG/1
2 AEROSOL, METERED RESPIRATORY (INHALATION)
COMMUNITY
Start: 2024-06-10 | End: 2025-06-10

## 2024-06-13 RX ORDER — CAPSAICIN 0.03 G/100G
CREAM TOPICAL 3 TIMES DAILY
COMMUNITY
Start: 2024-06-10 | End: 2025-06-10

## 2024-06-13 RX ORDER — FLUCONAZOLE 150 MG/1
150 TABLET ORAL ONCE
COMMUNITY
Start: 2024-02-26

## 2024-06-13 NOTE — PROGRESS NOTES
C3 nurse attempted to contact Jessica Floyd for a TCC post hospital discharge follow up call. No answer. Left voicemail with callback information. The patient does not have a scheduled HOSFU appointment. Message sent to PCP staff for assistance with scheduling visit with patient.

## 2024-06-13 NOTE — TELEPHONE ENCOUNTER
Left [facility] @ 1015 for pt to call back for message clarification. Awaiting pt response.

## 2024-06-13 NOTE — TELEPHONE ENCOUNTER
Spoke with patient and states that she will call her surgeon to see if she needs to be seen by the provider any time soon.

## 2024-06-14 DIAGNOSIS — R22.42 LOCALIZED SWELLING OF LEFT LOWER EXTREMITY: Primary | ICD-10-CM

## 2024-06-14 RX ORDER — HYDROCODONE BITARTRATE AND ACETAMINOPHEN 5; 325 MG/1; MG/1
1 TABLET ORAL EVERY 6 HOURS PRN
Qty: 12 TABLET | Refills: 0 | Status: SHIPPED | OUTPATIENT
Start: 2024-06-14

## 2024-06-17 ENCOUNTER — OFFICE VISIT (OUTPATIENT)
Dept: SPORTS MEDICINE | Facility: CLINIC | Age: 74
End: 2024-06-17
Payer: MEDICARE

## 2024-06-17 VITALS — BODY MASS INDEX: 20.54 KG/M2 | HEIGHT: 63 IN | WEIGHT: 115.94 LBS

## 2024-06-17 DIAGNOSIS — S46.011A TRAUMATIC COMPLETE TEAR OF RIGHT ROTATOR CUFF, INITIAL ENCOUNTER: Primary | ICD-10-CM

## 2024-06-17 DIAGNOSIS — M19.011 LOCALIZED PRIMARY OSTEOARTHRITIS OF RIGHT SHOULDER REGION: ICD-10-CM

## 2024-06-17 PROCEDURE — 3008F BODY MASS INDEX DOCD: CPT | Mod: CPTII,S$GLB,TXP, | Performed by: PHYSICIAN ASSISTANT

## 2024-06-17 PROCEDURE — 99213 OFFICE O/P EST LOW 20 MIN: CPT | Mod: 25,S$GLB,TXP, | Performed by: PHYSICIAN ASSISTANT

## 2024-06-17 PROCEDURE — 1111F DSCHRG MED/CURRENT MED MERGE: CPT | Mod: CPTII,S$GLB,TXP, | Performed by: PHYSICIAN ASSISTANT

## 2024-06-17 PROCEDURE — 20611 DRAIN/INJ JOINT/BURSA W/US: CPT | Mod: S$GLB,TXP,, | Performed by: PHYSICIAN ASSISTANT

## 2024-06-17 PROCEDURE — 1159F MED LIST DOCD IN RCRD: CPT | Mod: CPTII,S$GLB,TXP, | Performed by: PHYSICIAN ASSISTANT

## 2024-06-17 PROCEDURE — 99999 PR PBB SHADOW E&M-EST. PATIENT-LVL IV: CPT | Mod: PBBFAC,TXP,, | Performed by: PHYSICIAN ASSISTANT

## 2024-06-17 PROCEDURE — 1125F AMNT PAIN NOTED PAIN PRSNT: CPT | Mod: CPTII,S$GLB,TXP, | Performed by: PHYSICIAN ASSISTANT

## 2024-06-17 PROCEDURE — 1160F RVW MEDS BY RX/DR IN RCRD: CPT | Mod: CPTII,S$GLB,TXP, | Performed by: PHYSICIAN ASSISTANT

## 2024-06-17 RX ORDER — TRIAMCINOLONE ACETONIDE 40 MG/ML
40 INJECTION, SUSPENSION INTRA-ARTICULAR; INTRAMUSCULAR
Status: DISCONTINUED | OUTPATIENT
Start: 2024-06-17 | End: 2024-06-17 | Stop reason: HOSPADM

## 2024-06-17 RX ORDER — BUPIVACAINE HYDROCHLORIDE 2.5 MG/ML
2 INJECTION, SOLUTION INFILTRATION; PERINEURAL
Status: DISCONTINUED | OUTPATIENT
Start: 2024-06-17 | End: 2024-06-17 | Stop reason: HOSPADM

## 2024-06-17 RX ADMIN — TRIAMCINOLONE ACETONIDE 40 MG: 40 INJECTION, SUSPENSION INTRA-ARTICULAR; INTRAMUSCULAR at 02:06

## 2024-06-17 RX ADMIN — BUPIVACAINE HYDROCHLORIDE 2 ML: 2.5 INJECTION, SOLUTION INFILTRATION; PERINEURAL at 02:06

## 2024-06-17 NOTE — PROGRESS NOTES
C3 nurse spoke with Jessica Floyd for a TCC Post Discharge follow-up call. The patient has a scheduled [facility] appointment with the [facility] provider, Silva Arce NP., on 06/25/2024. The patient was explained that the NP will call you to provide a time-frame for the appointment.

## 2024-06-17 NOTE — PROGRESS NOTES
ESTABLISHED PATIENT    History 6/17/2024:  Jessica returns here today for follow-up evaluation of her right shoulder.  She complains today of having recurrent pain in his requesting a repeat injection.  She was recently hospitalized due to an incarcerated hernia and had to undergo urgent surgical correction with bowel resection.    History 2/28/2024:  Jessica returns here today for follow-up evaluation of her right shoulder.  She has rotator cuff tear arthropathy and has been treating her symptoms subjectively.  She was last given a subacromial injection in November which gave her temporary relief.  She complains today of having recurrent pain and is requesting a repeat injection.    History 11/29/2023:  Jessica returns to clinic today for follow-up of right shoulder pain.  She wants to discuss options for her right shoulder.  She has had a few significant events occurred recently.  She was initially booked for right reverse total shoulder arthroplasty but secondary to anxiety did not want to proceed with the surgery.  She subsequently had a fall and sustained a left hip fracture which required a left total hip arthroplasty.  Her primary complaint is still pain in the shoulder but she still has good active range of motion of the right shoulder.  She wants to discuss other minimally invasive options other than a reverse total shoulder replacement.  She wants to discuss a balloon procedure as an option.    History 7/24/2023:  Jessica returns here today for follow-up evaluation of her right shoulder.  She has been attending physical therapy but continues to have constant right shoulder pain with limited range of motion and weakness.  Of note, she is still recovering from a recent total hip arthroplasty due to femoral neck fracture.    History 5/1/2023:   73 y.o. Female with a history of right shoulder pain who is referred today by Armani Cai PA-C. She is retired and she states she has been in physical therapy for a  while for her knee and her shoulder. That has taken up most of her time. She has had a fall. She also had a incident a few months ago where she lifted herself up from a low toilet and she felt a pop in the shoulder. Armani gave her a CSI and she had some relief.         History 4/10/2023:  Jessica returns here today for follow-up evaluation of right shoulder.  She has been undergoing physical therapy for her large rotator cuff tear and glenohumeral joint osteoarthritis.  There has been improvement in her pain and function since her last visit.  She is still having difficulty performing simple ADLs such as cooking and trouble lifting objects overhead, but overall she seems to be very happy with her progress.    History 02/28/2023:  Jessica returns here today for follow-up of her right shoulder.  She has a large rotator cuff tear and underlying osteoarthritis.  She has previously failed a subacromial corticosteroid injection and we are attempting to manage her symptoms conservatively.  She was only able to attend 1 visit with physical therapy due to having uncontrollable epistaxis and having to go to the emergency room and ENT several times over the last 2 weeks.  She complains of having significant right shoulder pain and stiffness.    History 02/06/2023:  Jessica returns here today for follow-up evaluation of her right shoulder.  She was given a subacromial corticosteroid injection at her last visit.  While attempting to lift herself up from a restroom toilet, she felt a pop in her shoulder and had immediate discomfort.  She was seen in the emergency room for this.  A CT arthrogram was ordered for further evaluation of her rotator cuff.  She is here today to discuss the results.  She has been using a shoulder sling intermittently for pain relief.    History 01/23/2023:  Jessica returns here today for follow-up evaluation of her right shoulder.  She states that the Medrol Dosepak gave her very minimal relief.  She has had  "worsening pain and weakness since her last visit.  She is having a lot of difficulty getting dressed and sleeping at night.  She has been taking Tylenol p.m. at night which has helped her get some sleep.    Of note, she states that her knee pain has resolved following the Orthovisc series.    History 01/10/2023:  72 y.o. Female with a history of right shoulder pain who began having pain 1 week ago.  She denies having any precipitating injury or trauma but states that she was moving a lot of boxes which may have irritated her shoulder.  She is having a lot of discomfort with movement and at night while sleeping.  She has had difficulty getting dressed due to the pain and is frequently dropping objects due to weakness.        Is affecting ADLs.  Pain is 7/10 at it's worst.      REVIEW OF SYSTEMS   Constitution: Negative. Negative for chills, fever and night sweats.   HENT: Negative for congestion and headaches.    Eyes: Negative for blurred vision, left vision loss and right vision loss.   Cardiovascular: Negative for chest pain and syncope.   Respiratory: Negative for cough and shortness of breath.    Endocrine: Negative for polydipsia, polyphagia and polyuria.   Hematologic/Lymphatic: Negative for bleeding problem. Does not bruise/bleed easily.   Skin: Negative for dry skin, itching and rash.   Musculoskeletal: Negative for falls. Positive for right shoulder pain  Gastrointestinal: Negative for abdominal pain and bowel incontinence.   Genitourinary: Negative for bladder incontinence and nocturia.   Neurological: Negative for disturbances in coordination, loss of balance and seizures.   Psychiatric/Behavioral: Negative for depression. The patient does not have insomnia.    Allergic/Immunologic: Negative for hives and persistent infections.     PHYSICAL EXAMINATION    Vitals: Ht 5' 3" (1.6 m)   Wt 52.6 kg (115 lb 15.4 oz)   BMI 20.54 kg/m²     General: The patient appears active and healthy with no apparent physical " problems.  No disturbance of mood or affect is demonstrated. Alert and Oriented.    HEENT: Eyes normal, pupils equally round, nose normal.    Resp: Equal and symmetrical chest rises. No wheezing    CV: Regular rate    Neck: Supple; nonpainful range of motion.    Extremities: no cyanosis, clubbing, edema, or diffuse swelling.  Palpable pulses, good capillary refill of the digits.  No coolness, discoloration, edema or obvious varicosities.    Skin: no lesions noted.    Lymphatic: no detected adenopathy in the upper or lower extremities.    Neurologic: normal mental status, normal reflexes, normal gait and balance.  Patient is alert and oriented to person, place and time.  No flaccidity or spasticity is noted.  No motor or sensory deficits are noted.  Light touch is intact    Orthopaedic:     SHOULDER EXAM - RIGHT    Inspection:   Normal skin color and appearance with no scars.  No muscle atrophy noted.  No scapular winging.      Palpation: No tenderness of the acromioclavicular joint, lateral edge of the acromion, biceps tendon, trapezius muscle or scapulothoracic bursa.  Crepitus of the glenohumeral joint    ROM:      PROM:     FE - 150°    Abd/ER -  80°  Abd/IR -  30°   Add/ER -  40°     AROM:    FE - 150°  with pain beyond 90°.  Abd/ER -  80°  Abd/IR -  30°   Add/ER -  40°  *pain resisted ER       Tests:     - [place], - Neer's, - Cross Arm Adduction, - [place], - [place], - Speed. - Belly Press,  + Jobes, - Lift Off    Stability: - sulcus, - apprehension, - relocation, - load and shift, - DLS      Motor:  Rotator cuff strength is 4/5 supraspinatus, 4/5 infraspinatus, 5/5 subscapularis. Biceps, triceps and deltoid strength is 5/5.      Neuro     Distally there are no paresthesias, and sensation is intact to light touch in the median, ulnar, and radial distributions.  Reflexes are 2/2 biceps, triceps and brachioradialis.        IMAGING    CT Arthrogram Shoulder Right  Order: [id number]  Status: Final result      Visible to patient: Yes (seen)     Next appt: 07/26/2023 at 11:00 AM in Radiology ([facility] DEXA1)     Dx: Acute pain of right shoulder     0 Result Notes  Details    Reading Physician Reading Date Result Priority   Musa Maloney MD  [phone] 2/3/2023 Routine     Narrative & Impression  EXAMINATION:  CT ARTHROGRAM SHOULDER RIGHT     CLINICAL HISTORY:  Shoulder pain, rotator cuff disorder suspected, xray done;  Pain in right shoulder     TECHNIQUE:  Routine right multiplanar CT arthrogram shoulder performed after the intra-articular injection of contrast.     COMPARISON:  None     FINDINGS:  Rotator cuff: There is a large full-thickness rotator cuff tear involving the entire supra and infraspinatus tendons.  There is tendon retraction to the glenoid rim.  The teres minor and subscapularis are intact.  There is mild volume loss of both supra and infraspinatus muscles.  High-riding humeral head noted abutting the acromion.     Mild AC joint arthropathy.     The biceps tendon not identified, probably torn.     Generalized cartilage thinning/joint space narrowing noted with mild osteophytosis along the inferior margin of the humeral head.  No fractures or marrow replacement process.  No evidence of osteomyelitis.  No axillary lymphadenopathy.  Emphysematous changes noted in the right lung apex.     Impression:     Large full-thickness rotator cuff tear, involving the infraspinatus and supraspinatus tendons, as above.     This report was flagged in Epic as abnormal.        Electronically signed by: Musa Maloney MD  Date:                                            02/03/2023  Time:                                           13:22           Exam Ended: 02/03/23 12:33 Last Resulted: 02/03/23 13:22           X-Ray Shoulder Complete 2 View Right  Order: [id number]  Status: Final result     Visible to patient: Yes (seen)     Next appt: 07/26/2023 at 11:00 AM in Radiology ([facility] DEXA[facility])     Dx: Right shoulder pain     0 Result  Notes  Details    Reading Physician Reading Date Result Priority   Myrna Hameed MD  [phone] 1/27/2023 STAT     Narrative & Impression  EXAMINATION:  XR SHOULDER COMPLETE 2 OR MORE VIEWS RIGHT     CLINICAL HISTORY:  Pain in right shoulder     TECHNIQUE:  Two views of the right shoulder were performed.     COMPARISON:  01/10/2023.     FINDINGS:  No evidence of acute displaced fracture, dislocation, or osseous destructive process.  Suspected remote healed fracture deformity is seen is seen of the right humeral head and neck.  Mild degenerative changes are seen at the glenohumeral and acromioclavicular joints.     Impression:     No acute osseous abnormality identified.        Electronically signed by: Myrna Hameed MD  Date:                                            01/27/2023  Time:                                           17:41           Exam Ended: 01/27/23 17:33 Last Resulted: 01/27/23 17:41               IMPRESSION       ICD-10-CM ICD-9-CM   1. Traumatic complete tear of right rotator cuff, initial encounter  S46.011A 840.4   2. Localized primary osteoarthritis of right shoulder region  M19.011 715.11         MEDICATIONS PRESCRIBED      None    RECOMMENDATIONS     Activity modifications given to the patient today  Repeat CSI of right sub-acromial bursa performed under ultrasound guidance  RTC in 3 months if needed  I advised the patient that based on her current medical comorbidities that surgery right now may not be the best option.  She had relief with a subacromial injection.  I advised her that she can continue to do this for quite some time.  I also advised her she is not a candidate for a balloon procedure.  The only definitive surgery that would help improve her pain would be a reverse total shoulder arthroplasty.  However, I would reserve this procedure until she is medically optimized and subacromial injections have failed.        Sports Medicine US - Guidance for Needle  Placement    Date/Time: 6/17/2024 2:30 PM    Performed by: Armani Cai PA-C  Authorized by: Armani Cai PA-C  Preparation: Patient was prepped and draped in the usual sterile fashion.  Local anesthesia used: yes    Anesthesia:  Local anesthesia used: yes  Local Anesthetic: co-phenylcaine spray    Sedation:  Patient sedated: no    Patient tolerance: patient tolerated the procedure well with no immediate complications      Large Joint Aspiration/Injection: R subacromial bursa    Date/Time: 6/17/2024 2:30 PM    Performed by: Armani Cai PA-C  Authorized by: Armani Cai PA-C    Consent Done?:  Yes (Verbal)  Indications:  Pain  Site marked: the procedure site was marked    Timeout: prior to procedure the correct patient, procedure, and site was verified    Prep: patient was prepped and draped in usual sterile fashion      Local anesthesia used?: Yes    Local anesthetic:  Co-phenylcaine spray    Details:  Needle Size:  22 G  Ultrasonic Guidance for needle placement?: Yes (Ultrasound guidance was used for needle localization.  Images were saved and stored for documentation.  Dynamic visualization of the needle was continuous throughout the procedure and maintained accurate placement)    Images are saved and documented.  Approach:  Lateral  Location:  Shoulder  Site:  R subacromial bursa  Medications:  2 mL BUPivacaine 0.25% (2.5 mg/ml) 0.25 % (2.5 mg/mL); 40 mg triamcinolone acetonide 40 mg/mL  Patient tolerance:  Patient tolerated the procedure well with no immediate complications      All questions were answered, pt will contact us for questions or concerns in the interim.

## 2024-06-18 ENCOUNTER — OFFICE VISIT (OUTPATIENT)
Dept: PODIATRY | Facility: CLINIC | Age: 74
End: 2024-06-18
Payer: MEDICARE

## 2024-06-18 ENCOUNTER — APPOINTMENT (OUTPATIENT)
Dept: RADIOLOGY | Facility: OTHER | Age: 74
End: 2024-06-18
Attending: PODIATRIST
Payer: MEDICARE

## 2024-06-18 VITALS
DIASTOLIC BLOOD PRESSURE: 67 MMHG | BODY MASS INDEX: 20.54 KG/M2 | SYSTOLIC BLOOD PRESSURE: 145 MMHG | HEIGHT: 63 IN | HEART RATE: 87 BPM | WEIGHT: 115.94 LBS

## 2024-06-18 DIAGNOSIS — M10.072 ACUTE IDIOPATHIC GOUT INVOLVING TOE OF LEFT FOOT: ICD-10-CM

## 2024-06-18 DIAGNOSIS — L89.620 PRESSURE ULCER, HEEL, LEFT, UNSTAGEABLE: Primary | ICD-10-CM

## 2024-06-18 DIAGNOSIS — M79.672 FOOT PAIN, LEFT: ICD-10-CM

## 2024-06-18 DIAGNOSIS — B35.1 ONYCHOMYCOSIS DUE TO DERMATOPHYTE: ICD-10-CM

## 2024-06-18 DIAGNOSIS — L89.620 PRESSURE ULCER, HEEL, LEFT, UNSTAGEABLE: ICD-10-CM

## 2024-06-18 PROCEDURE — 99214 OFFICE O/P EST MOD 30 MIN: CPT | Mod: ,,, | Performed by: PODIATRIST

## 2024-06-18 PROCEDURE — 3078F DIAST BP <80 MM HG: CPT | Mod: CPTII,,, | Performed by: PODIATRIST

## 2024-06-18 PROCEDURE — 99999 PR PBB SHADOW E&M-EST. PATIENT-LVL IV: CPT | Mod: PBBFAC,,, | Performed by: PODIATRIST

## 2024-06-18 PROCEDURE — 1125F AMNT PAIN NOTED PAIN PRSNT: CPT | Mod: CPTII,,, | Performed by: PODIATRIST

## 2024-06-18 PROCEDURE — 3077F SYST BP >= 140 MM HG: CPT | Mod: CPTII,,, | Performed by: PODIATRIST

## 2024-06-18 PROCEDURE — 73650 X-RAY EXAM OF HEEL: CPT | Mod: TC,PN,LT,TXP

## 2024-06-18 PROCEDURE — 3288F FALL RISK ASSESSMENT DOCD: CPT | Mod: CPTII,,, | Performed by: PODIATRIST

## 2024-06-18 PROCEDURE — 17999 UNLISTD PX SKN MUC MEMB SUBQ: CPT | Mod: CSM,S$GLB,, | Performed by: PODIATRIST

## 2024-06-18 PROCEDURE — 1101F PT FALLS ASSESS-DOCD LE1/YR: CPT | Mod: CPTII,,, | Performed by: PODIATRIST

## 2024-06-18 PROCEDURE — 1111F DSCHRG MED/CURRENT MED MERGE: CPT | Mod: CPTII,,, | Performed by: PODIATRIST

## 2024-06-18 PROCEDURE — 1159F MED LIST DOCD IN RCRD: CPT | Mod: CPTII,,, | Performed by: PODIATRIST

## 2024-06-18 PROCEDURE — 3008F BODY MASS INDEX DOCD: CPT | Mod: CPTII,,, | Performed by: PODIATRIST

## 2024-06-18 RX ORDER — LIDOCAINE AND PRILOCAINE 25; 25 MG/G; MG/G
CREAM TOPICAL
Qty: 30 G | Refills: 3 | Status: SHIPPED | OUTPATIENT
Start: 2024-06-18

## 2024-06-18 NOTE — PROGRESS NOTES
Subjective:      Patient ID: Jessica Floyd is a 73 y.o. female.    Chief Complaint: Gout (Flare up), Nail Care (Proc B), and Foot Problem (Sore on back of L heel)    Pain central toes left forefoot.  Rapid onset in the postoperative phase recovering from abdominal surgery about 2 weeks ago.  Aggravated by touch pressure motion.  Wound gout medications were resumed following the attack in the hospital, she is improved some without complete resolution but feel she is still improving steadily.  Denies trauma and surgery left foot.    Cc2  discoloration/ulceration of the left heel.  Gradual onset with immobility in same position in recliner in the postop phase in the hospital.  Aggravated by pressure when at rest She relates it is improving slowly.  No prior medical treatment.  No self-treatment.  Denies trauma and surgery both feet    Cc3 long thick discolored toenails all 10 toes.  Chronic condition which periodically recurs.  This exacerbations been gradual onset worsening over the past several weeks aggravated by socks shoes pressure ambulation.  With the patient's permission, I debrided all ten toenails with a sterile nipper and curette, removing all offending nail and debris.  Patient tolerated the procedure well and related significant relief.   No self-treatment.  Denies trauma and surgery both feet      Review of Systems   Constitutional: Negative for chills, diaphoresis, fever, malaise/fatigue and night sweats.   Cardiovascular:  Negative for claudication, cyanosis, leg swelling and syncope.   Skin:  Positive for nail changes and poor wound healing. Negative for color change, dry skin, rash, suspicious lesions and unusual hair distribution.   Musculoskeletal:  Positive for gout and joint pain. Negative for falls, joint swelling, muscle cramps, muscle weakness and stiffness.   Gastrointestinal:  Negative for constipation, diarrhea, nausea and vomiting.   Neurological:  Negative for brief paralysis, disturbances  in coordination, focal weakness, numbness, paresthesias, sensory change and tremors.         Objective:      Physical Exam  Constitutional:       General: She is not in acute distress.     Appearance: She is well-developed. She is not diaphoretic.   Cardiovascular:      Pulses:           Popliteal pulses are 2+ on the right side and 2+ on the left side.        Dorsalis pedis pulses are 2+ on the right side and 2+ on the left side.        Posterior tibial pulses are 2+ on the right side and 2+ on the left side.      Comments: Capillary refill 3 seconds all toes/distal feet, all toes/both feet warm to touch.      Negative lymphadenopathy bilateral popliteal fossa and tarsal tunnel.      Negavie lower extremity edema bilateral.    Musculoskeletal:      Right ankle: No swelling, deformity, ecchymosis or lacerations. Normal range of motion. Normal pulse.      Right Achilles Tendon: Normal. No defects. Lopez's test negative.      Comments: Minimal residual pain with palpation range of motion toes 2 through 4 left with baseline hammertoe deformity wishes partially reducible without new deformity loss of function signs of acute trauma.   Lymphadenopathy:      Lower Body: No right inguinal adenopathy. No left inguinal adenopathy.      Comments: Negative lymphadenopathy bilateral popliteal fossa and tarsal tunnel.    Negative lymphangitic streaking bilateral feet/ankles/legs.   Skin:     General: Skin is warm and dry.      Capillary Refill: Capillary refill takes 2 to 3 seconds.      Coloration: Skin is not pale.      Findings: No abrasion, bruising, burn, ecchymosis, erythema, laceration, lesion or rash.      Nails: There is no clubbing.      Comments:   Generalized geographic superficial discoloration consistent with deep tissue injury which is not yet fully evolved in the posterior inferior aspect of the left heel without open skin drainage pus tracking fluctuance malodor signs of infection left heel.      Otherwise,  Skin thin, shiny, atrophic, with decreased density and distribution of pedal hair bilateral, but without hyperpigmentation, kirti discoloration,  ulcers, masses, nodules or cords palpated bilateral feet and legs.    Toenails 1st, 2nd, 3rd, 4th, 5th  bilateral are hypertrophic thickened 2-3 mm, dystrophic, discolored tanish brown with tan, gray crumbly subungual debris.  Tender to distal nail plate pressure, without periungual skin abnormality of each.     Neurological:      Mental Status: She is alert and oriented to person, place, and time.      Sensory: No sensory deficit.      Motor: No tremor, atrophy or abnormal muscle tone.      Gait: Gait normal.   Psychiatric:         Behavior: Behavior is cooperative.           Assessment:       Encounter Diagnoses   Name Primary?    Pressure ulcer, heel, left, unstageable Yes    Foot pain, left     Acute idiopathic gout involving toe of left foot     Onychomycosis due to dermatophyte          Plan:       Jessica was seen today for gout, nail care and foot problem.    Diagnoses and all orders for this visit:    Pressure ulcer, heel, left, unstageable  -     X-Ray Calcaneus 2 View Left; Future  -     SPLINT FOR HOME USE    Foot pain, left    Acute idiopathic gout involving toe of left foot    Onychomycosis due to dermatophyte    Other orders  -     LIDOcaine-prilocaine (EMLA) cream; Apply topically as needed.      I counseled the patient on her conditions, their implications and medical management.        Postop gout:  Continue current meds from preop allow them to optimize.  Topical EMLA cream once nightly left forefoot to help relieve pain and facilitate sleep.    Discoloration/heel wound left:  X-rays left foot.  Prescribed multi Podus boot to help offload the heel when at rest particularly in bed in the original position which contributed to the wound.      Long thick discolored misshapen toenails:  Patient is aware that with her current diagnoses the trimming of  nails is not a covered service by insurance.  She verbally expresses understanding and willingness to pay 42 dollars for non-covered foot care  With the patient's permission, I debrided all ten toenails with a sterile nipper and curette, removing all offending nail and debris.  Patient tolerated the procedure well and related significant relief.        One-week to monitor/we are evaluate wound left heel.        Follow up in about 1 week (around 6/25/2024) for Heel wound left.

## 2024-06-19 ENCOUNTER — OFFICE VISIT (OUTPATIENT)
Dept: SURGERY | Facility: CLINIC | Age: 74
End: 2024-06-19
Payer: MEDICARE

## 2024-06-19 VITALS
HEART RATE: 81 BPM | BODY MASS INDEX: 20.54 KG/M2 | OXYGEN SATURATION: 93 % | DIASTOLIC BLOOD PRESSURE: 62 MMHG | HEIGHT: 63 IN | SYSTOLIC BLOOD PRESSURE: 112 MMHG | WEIGHT: 115.94 LBS

## 2024-06-19 DIAGNOSIS — L03.116 CELLULITIS OF LEFT LOWER EXTREMITY: Primary | ICD-10-CM

## 2024-06-19 PROCEDURE — 99999 PR PBB SHADOW E&M-EST. PATIENT-LVL V: CPT | Mod: PBBFAC,TXP,, | Performed by: STUDENT IN AN ORGANIZED HEALTH CARE EDUCATION/TRAINING PROGRAM

## 2024-06-19 NOTE — PROGRESS NOTES
"[facility]  General Surgery  Post-Operative Note    Subjective:       Jessica Floyd is a 73 year-old woman who presents to the clinic 1 weeks following treatment for LLE wound and cellulitis. She is eating a regular diet without difficulty. Bowel movements are  normal . Her wound is healing nicely and she was seen by the  nurse.  The patient is not having any pain..      Objective:      /62 (BP Location: Right arm, Patient Position: Sitting, BP Method: Small (Automatic))   Pulse 81   Ht 5' 3" (1.6 m)   Wt 52.6 kg (115 lb 15.4 oz)   SpO2 (!) 93%   BMI 20.54 kg/m²     General:  alert, appears stated age, and cooperative   Abdomen: soft, bowel sounds active, non-tender   LLE Wound:   healing well, serous drainage, good granulation tissue, no foul odor, wound measured approximately 3.5 cm in length and 1.2 cm in width      Pathology: None    Assessment:     Jessica Floyd is a 73 year-old woman doing well s/p treatment for LLE wound and cellulitis    Plan:     1. Continue any current medications.  2. Wound care discussed. Ok to shower. No bathing until wound is completely healed.  3. Pt is to increase activities as tolerated.  4. Follow up as needed.      Ruben Oscar MD, [facility]  General Surgery and Surgical Critical Care  [facility]  "

## 2024-06-20 ENCOUNTER — PATIENT MESSAGE (OUTPATIENT)
Dept: SURGERY | Facility: CLINIC | Age: 74
End: 2024-06-20
Payer: MEDICARE

## 2024-06-25 ENCOUNTER — OFFICE VISIT (OUTPATIENT)
Dept: PODIATRY | Facility: CLINIC | Age: 74
End: 2024-06-25
Payer: MEDICARE

## 2024-06-25 VITALS
HEART RATE: 76 BPM | WEIGHT: 115.94 LBS | DIASTOLIC BLOOD PRESSURE: 69 MMHG | SYSTOLIC BLOOD PRESSURE: 122 MMHG | BODY MASS INDEX: 20.54 KG/M2 | HEIGHT: 63 IN

## 2024-06-25 DIAGNOSIS — L89.620 PRESSURE ULCER, HEEL, LEFT, UNSTAGEABLE: Primary | ICD-10-CM

## 2024-06-25 DIAGNOSIS — M79.672 FOOT PAIN, LEFT: ICD-10-CM

## 2024-06-25 PROCEDURE — 99999 PR PBB SHADOW E&M-EST. PATIENT-LVL IV: CPT | Mod: PBBFAC,,, | Performed by: PODIATRIST

## 2024-06-25 PROCEDURE — 1159F MED LIST DOCD IN RCRD: CPT | Mod: CPTII,S$GLB,, | Performed by: PODIATRIST

## 2024-06-25 PROCEDURE — 3008F BODY MASS INDEX DOCD: CPT | Mod: CPTII,S$GLB,, | Performed by: PODIATRIST

## 2024-06-25 PROCEDURE — 1101F PT FALLS ASSESS-DOCD LE1/YR: CPT | Mod: CPTII,S$GLB,, | Performed by: PODIATRIST

## 2024-06-25 PROCEDURE — 1111F DSCHRG MED/CURRENT MED MERGE: CPT | Mod: CPTII,S$GLB,, | Performed by: PODIATRIST

## 2024-06-25 PROCEDURE — 3078F DIAST BP <80 MM HG: CPT | Mod: CPTII,S$GLB,, | Performed by: PODIATRIST

## 2024-06-25 PROCEDURE — 99213 OFFICE O/P EST LOW 20 MIN: CPT | Mod: S$GLB,,, | Performed by: PODIATRIST

## 2024-06-25 PROCEDURE — 1126F AMNT PAIN NOTED NONE PRSNT: CPT | Mod: CPTII,S$GLB,, | Performed by: PODIATRIST

## 2024-06-25 PROCEDURE — 3288F FALL RISK ASSESSMENT DOCD: CPT | Mod: CPTII,S$GLB,, | Performed by: PODIATRIST

## 2024-06-25 PROCEDURE — 3074F SYST BP LT 130 MM HG: CPT | Mod: CPTII,S$GLB,, | Performed by: PODIATRIST

## 2024-06-25 NOTE — PROGRESS NOTES
Subjective:      Patient ID: Jessica Floyd is a 73 y.o. female.    Chief Complaint: Foot Ulcer    Pain central toes left forefoot.  Rapid onset in the postoperative phase recovering from abdominal surgery about 2 weeks ago.  Aggravated by touch pressure motion.  Wound gout medications were resumed following the attack in the hospital, she is improved some without complete resolution but feel she is still improving steadily.  Topical EMLA cream helps pain some.  Denies trauma and surgery left foot.    Cc2  discoloration/ulceration of the left heel.  Gradual onset with immobility in same position in recliner in the postop phase in the hospital.  X-rays June 18, 2024- negative for soft tissue gas, foreign body, osseous erosion left heel.  Aggravated by pressure when at rest She relates it is improving slowly.  Offload boot is cumbersome but helpful-it was discontinued at visit with her surgeon this week.  Denies trauma and surgery both feet        Review of Systems   Constitutional: Negative for chills, diaphoresis, fever, malaise/fatigue and night sweats.   Cardiovascular:  Negative for claudication, cyanosis, leg swelling and syncope.   Skin:  Positive for nail changes and poor wound healing. Negative for color change, dry skin, rash, suspicious lesions and unusual hair distribution.   Musculoskeletal:  Positive for gout and joint pain. Negative for falls, joint swelling, muscle cramps, muscle weakness and stiffness.   Gastrointestinal:  Negative for constipation, diarrhea, nausea and vomiting.   Neurological:  Negative for brief paralysis, disturbances in coordination, focal weakness, numbness, paresthesias, sensory change and tremors.           Objective:      Physical Exam  Constitutional:       General: She is not in acute distress.     Appearance: She is well-developed. She is not diaphoretic.   Cardiovascular:      Pulses:           Popliteal pulses are 2+ on the right side and 2+ on the left side.         Dorsalis pedis pulses are 2+ on the right side and 2+ on the left side.        Posterior tibial pulses are 2+ on the right side and 2+ on the left side.      Comments: Capillary refill 3 seconds all toes/distal feet, all toes/both feet warm to touch.      Negative lymphadenopathy bilateral popliteal fossa and tarsal tunnel.      Negavie lower extremity edema bilateral.    Musculoskeletal:      Right ankle: No swelling, deformity, ecchymosis or lacerations. Normal range of motion. Normal pulse.      Right Achilles Tendon: Normal. No defects. Lopez's test negative.      Comments: No current pain with palpation range of motion toes 2 through 4 left with baseline hammertoe deformity wishes partially reducible without new deformity loss of function signs of acute trauma.   Lymphadenopathy:      Lower Body: No right inguinal adenopathy. No left inguinal adenopathy.      Comments: Negative lymphadenopathy bilateral popliteal fossa and tarsal tunnel.    Negative lymphangitic streaking bilateral feet/ankles/legs.   Skin:     General: Skin is warm and dry.      Capillary Refill: Capillary refill takes 2 to 3 seconds.      Coloration: Skin is not pale.      Findings: No abrasion, bruising, burn, ecchymosis, erythema, laceration, lesion or rash.      Nails: There is no clubbing.      Comments:   Generalized geographic superficial discoloration consistent with deep tissue injury which now resolving well in the posterior inferior aspect of the left heel without open skin drainage pus tracking fluctuance malodor signs of infection left heel.      Otherwise, Skin thin, shiny, atrophic, with decreased density and distribution of pedal hair bilateral, but without hyperpigmentation, kirti discoloration,  ulcers, masses, nodules or cords palpated bilateral feet and legs.    Toenails 1st, 2nd, 3rd, 4th, 5th  bilateral are hypertrophic thickened 2-3 mm, dystrophic, discolored tanish brown with tan, gray crumbly subungual debris.   Neatly trimmed and not tender to distal nail plate pressure, without periungual skin abnormality of each.     Neurological:      Mental Status: She is alert and oriented to person, place, and time.      Sensory: No sensory deficit.      Motor: No tremor, atrophy or abnormal muscle tone.      Gait: Gait normal.   Psychiatric:         Behavior: Behavior is cooperative.             Assessment:       Encounter Diagnoses   Name Primary?    Pressure ulcer, heel, left, unstageable Yes    Foot pain, left          Plan:       Jessica was seen today for foot ulcer.    Diagnoses and all orders for this visit:    Pressure ulcer, heel, left, unstageable    Foot pain, left      I counseled the patient on her conditions, their implications and medical management.        Postop gout:  Continue current meds from preop allow them to optimize.  Topical EMLA cream once nightly left forefoot to help relieve pain and facilitate sleep.    Discoloration/heel wound left:  Continue reasonable offloading and monitoring daily.  Follow if any signs of discoloration swelling or skin damage occur.            P.r.n.        Follow up if symptoms worsen or fail to improve.

## 2024-06-26 ENCOUNTER — TELEPHONE (OUTPATIENT)
Dept: TRANSPLANT | Facility: CLINIC | Age: 74
End: 2024-06-26
Payer: MEDICARE

## 2024-07-04 ENCOUNTER — NURSE TRIAGE (OUTPATIENT)
Dept: ADMINISTRATIVE | Facility: CLINIC | Age: 74
End: 2024-07-04
Payer: MEDICARE

## 2024-07-04 ENCOUNTER — HOSPITAL ENCOUNTER (INPATIENT)
Facility: HOSPITAL | Age: 74
LOS: 5 days | Discharge: HOME | DRG: 389 | End: 2024-07-09
Attending: EMERGENCY MEDICINE | Admitting: SURGERY
Payer: MEDICARE

## 2024-07-04 DIAGNOSIS — F41.9 ANXIETY: ICD-10-CM

## 2024-07-04 DIAGNOSIS — R10.9 ABDOMINAL PAIN: ICD-10-CM

## 2024-07-04 DIAGNOSIS — K56.609 SMALL BOWEL OBSTRUCTION: Primary | ICD-10-CM

## 2024-07-04 DIAGNOSIS — Z46.59 ENCOUNTER FOR NASOGASTRIC (NG) TUBE PLACEMENT: ICD-10-CM

## 2024-07-04 LAB
ABO + RH BLD: NORMAL
ALBUMIN SERPL BCP-MCNC: 3.4 G/DL (ref 3.5–5.2)
ALLENS TEST: NORMAL
ALP SERPL-CCNC: 91 U/L (ref 55–135)
ALT SERPL W/O P-5'-P-CCNC: 13 U/L (ref 10–44)
ANION GAP SERPL CALC-SCNC: 17 MMOL/L (ref 8–16)
ANISOCYTOSIS BLD QL SMEAR: SLIGHT
ANISOCYTOSIS BLD QL SMEAR: SLIGHT
AST SERPL-CCNC: 26 U/L (ref 10–40)
BACTERIA #/AREA URNS AUTO: NORMAL /HPF
BASOPHILS # BLD AUTO: 0.13 K/UL (ref 0–0.2)
BASOPHILS # BLD AUTO: 0.2 K/UL (ref 0–0.2)
BASOPHILS NFR BLD: 0.7 % (ref 0–1.9)
BASOPHILS NFR BLD: 1.2 % (ref 0–1.9)
BILIRUB SERPL-MCNC: 0.7 MG/DL (ref 0.1–1)
BILIRUB UR QL STRIP: NEGATIVE
BLD GP AB SCN CELLS X3 SERPL QL: NORMAL
BNP SERPL-MCNC: 259 PG/ML (ref 0–99)
BUN SERPL-MCNC: 20 MG/DL (ref 8–23)
CALCIUM SERPL-MCNC: 9.7 MG/DL (ref 8.7–10.5)
CHLORIDE SERPL-SCNC: 98 MMOL/L (ref 95–110)
CLARITY UR REFRACT.AUTO: CLEAR
CO2 SERPL-SCNC: 22 MMOL/L (ref 23–29)
COLOR UR AUTO: YELLOW
CREAT SERPL-MCNC: 1.1 MG/DL (ref 0.5–1.4)
DIFFERENTIAL METHOD BLD: ABNORMAL
DIFFERENTIAL METHOD BLD: ABNORMAL
EOSINOPHIL # BLD AUTO: 0.1 K/UL (ref 0–0.5)
EOSINOPHIL # BLD AUTO: 0.1 K/UL (ref 0–0.5)
EOSINOPHIL NFR BLD: 0.4 % (ref 0–8)
EOSINOPHIL NFR BLD: 0.5 % (ref 0–8)
ERYTHROCYTE [DISTWIDTH] IN BLOOD BY AUTOMATED COUNT: 22.6 % (ref 11.5–14.5)
ERYTHROCYTE [DISTWIDTH] IN BLOOD BY AUTOMATED COUNT: 22.7 % (ref 11.5–14.5)
EST. GFR  (NO RACE VARIABLE): 52.7 ML/MIN/1.73 M^2
GLUCOSE SERPL-MCNC: 128 MG/DL (ref 70–110)
GLUCOSE UR QL STRIP: NEGATIVE
HCT VFR BLD AUTO: 30.7 % (ref 37–48.5)
HCT VFR BLD AUTO: 32.9 % (ref 37–48.5)
HGB BLD-MCNC: 10.4 G/DL (ref 12–16)
HGB BLD-MCNC: 9.3 G/DL (ref 12–16)
HGB UR QL STRIP: NEGATIVE
HYALINE CASTS UR QL AUTO: 0 /LPF
HYPOCHROMIA BLD QL SMEAR: ABNORMAL
HYPOCHROMIA BLD QL SMEAR: ABNORMAL
IMM GRANULOCYTES # BLD AUTO: 0.26 K/UL (ref 0–0.04)
IMM GRANULOCYTES # BLD AUTO: 0.4 K/UL (ref 0–0.04)
IMM GRANULOCYTES NFR BLD AUTO: 1.5 % (ref 0–0.5)
IMM GRANULOCYTES NFR BLD AUTO: 2 % (ref 0–0.5)
INR PPP: 1 (ref 0.8–1.2)
KETONES UR QL STRIP: NEGATIVE
LACTATE SERPL-SCNC: 1 MMOL/L (ref 0.5–2.2)
LDH SERPL L TO P-CCNC: 1.73 MMOL/L (ref 0.5–2.2)
LEUKOCYTE ESTERASE UR QL STRIP: NEGATIVE
LYMPHOCYTES # BLD AUTO: 0.8 K/UL (ref 1–4.8)
LYMPHOCYTES # BLD AUTO: 1.3 K/UL (ref 1–4.8)
LYMPHOCYTES NFR BLD: 3.8 % (ref 18–48)
LYMPHOCYTES NFR BLD: 7.5 % (ref 18–48)
MAGNESIUM SERPL-MCNC: 1.8 MG/DL (ref 1.6–2.6)
MCH RBC QN AUTO: 30.8 PG (ref 27–31)
MCH RBC QN AUTO: 31.3 PG (ref 27–31)
MCHC RBC AUTO-ENTMCNC: 30.3 G/DL (ref 32–36)
MCHC RBC AUTO-ENTMCNC: 31.6 G/DL (ref 32–36)
MCV RBC AUTO: 102 FL (ref 82–98)
MCV RBC AUTO: 99 FL (ref 82–98)
MICROSCOPIC COMMENT: NORMAL
MONOCYTES # BLD AUTO: 4.2 K/UL (ref 0.3–1)
MONOCYTES # BLD AUTO: 5 K/UL (ref 0.3–1)
MONOCYTES NFR BLD: 20.9 % (ref 4–15)
MONOCYTES NFR BLD: 29.6 % (ref 4–15)
NEUTROPHILS # BLD AUTO: 10.1 K/UL (ref 1.8–7.7)
NEUTROPHILS # BLD AUTO: 14.3 K/UL (ref 1.8–7.7)
NEUTROPHILS NFR BLD: 59.8 % (ref 38–73)
NEUTROPHILS NFR BLD: 72.1 % (ref 38–73)
NITRITE UR QL STRIP: NEGATIVE
NRBC BLD-RTO: 0 /100 WBC
NRBC BLD-RTO: 0 /100 WBC
OHS QRS DURATION: 86 MS
OHS QTC CALCULATION: 424 MS
OVALOCYTES BLD QL SMEAR: ABNORMAL
OVALOCYTES BLD QL SMEAR: ABNORMAL
PH UR STRIP: 8 [PH] (ref 5–8)
PLATELET # BLD AUTO: 304 K/UL (ref 150–450)
PLATELET # BLD AUTO: 346 K/UL (ref 150–450)
PLATELET BLD QL SMEAR: ABNORMAL
PMV BLD AUTO: 9.8 FL (ref 9.2–12.9)
PMV BLD AUTO: 9.9 FL (ref 9.2–12.9)
POIKILOCYTOSIS BLD QL SMEAR: SLIGHT
POIKILOCYTOSIS BLD QL SMEAR: SLIGHT
POLYCHROMASIA BLD QL SMEAR: ABNORMAL
POLYCHROMASIA BLD QL SMEAR: ABNORMAL
POTASSIUM SERPL-SCNC: 3.7 MMOL/L (ref 3.5–5.1)
PROT SERPL-MCNC: 8 G/DL (ref 6–8.4)
PROT UR QL STRIP: ABNORMAL
PROTHROMBIN TIME: 10.5 SEC (ref 9–12.5)
RBC # BLD AUTO: 3.02 M/UL (ref 4–5.4)
RBC # BLD AUTO: 3.32 M/UL (ref 4–5.4)
RBC #/AREA URNS AUTO: 2 /HPF (ref 0–4)
SAMPLE: NORMAL
SITE: NORMAL
SODIUM SERPL-SCNC: 137 MMOL/L (ref 136–145)
SP GR UR STRIP: >1.03 (ref 1–1.03)
SPECIMEN OUTDATE: NORMAL
SPHEROCYTES BLD QL SMEAR: ABNORMAL
SPHEROCYTES BLD QL SMEAR: ABNORMAL
SQUAMOUS #/AREA URNS AUTO: 2 /HPF
TROPONIN I SERPL DL<=0.01 NG/ML-MCNC: <0.006 NG/ML (ref 0–0.03)
URN SPEC COLLECT METH UR: ABNORMAL
WBC # BLD AUTO: 16.91 K/UL (ref 3.9–12.7)
WBC # BLD AUTO: 19.87 K/UL (ref 3.9–12.7)
WBC #/AREA URNS AUTO: 0 /HPF (ref 0–5)

## 2024-07-04 PROCEDURE — 85610 PROTHROMBIN TIME: CPT | Mod: NTX

## 2024-07-04 PROCEDURE — 83735 ASSAY OF MAGNESIUM: CPT | Mod: NTX

## 2024-07-04 PROCEDURE — 63600175 PHARM REV CODE 636 W HCPCS: Mod: NTX

## 2024-07-04 PROCEDURE — 81001 URINALYSIS AUTO W/SCOPE: CPT | Mod: NTX

## 2024-07-04 PROCEDURE — 96361 HYDRATE IV INFUSION ADD-ON: CPT | Mod: NTX

## 2024-07-04 PROCEDURE — 93010 ELECTROCARDIOGRAM REPORT: CPT | Mod: NTX,,, | Performed by: INTERNAL MEDICINE

## 2024-07-04 PROCEDURE — 84484 ASSAY OF TROPONIN QUANT: CPT | Mod: NTX

## 2024-07-04 PROCEDURE — 0D9670Z DRAINAGE OF STOMACH WITH DRAINAGE DEVICE, VIA NATURAL OR ARTIFICIAL OPENING: ICD-10-PCS | Performed by: SURGERY

## 2024-07-04 PROCEDURE — 83880 ASSAY OF NATRIURETIC PEPTIDE: CPT | Mod: NTX

## 2024-07-04 PROCEDURE — 21400001 HC TELEMETRY ROOM: Mod: NTX

## 2024-07-04 PROCEDURE — 99285 EMERGENCY DEPT VISIT HI MDM: CPT | Mod: 25,NTX

## 2024-07-04 PROCEDURE — 83605 ASSAY OF LACTIC ACID: CPT | Mod: NTX

## 2024-07-04 PROCEDURE — 85025 COMPLETE CBC W/AUTO DIFF WBC: CPT | Mod: NTX

## 2024-07-04 PROCEDURE — 83605 ASSAY OF LACTIC ACID: CPT | Mod: NTX | Performed by: SURGERY

## 2024-07-04 PROCEDURE — 99900035 HC TECH TIME PER 15 MIN (STAT): Mod: NTX

## 2024-07-04 PROCEDURE — 86901 BLOOD TYPING SEROLOGIC RH(D): CPT | Mod: NTX

## 2024-07-04 PROCEDURE — 27000221 HC OXYGEN, UP TO 24 HOURS: Mod: NTX

## 2024-07-04 PROCEDURE — 93005 ELECTROCARDIOGRAM TRACING: CPT | Mod: NTX

## 2024-07-04 PROCEDURE — 25000003 PHARM REV CODE 250: Mod: NTX

## 2024-07-04 PROCEDURE — 96374 THER/PROPH/DIAG INJ IV PUSH: CPT | Mod: NTX

## 2024-07-04 PROCEDURE — 25000242 PHARM REV CODE 250 ALT 637 W/ HCPCS: Mod: NTX | Performed by: STUDENT IN AN ORGANIZED HEALTH CARE EDUCATION/TRAINING PROGRAM

## 2024-07-04 PROCEDURE — 96375 TX/PRO/DX INJ NEW DRUG ADDON: CPT | Mod: NTX

## 2024-07-04 PROCEDURE — 80053 COMPREHEN METABOLIC PANEL: CPT | Mod: NTX

## 2024-07-04 PROCEDURE — 85025 COMPLETE CBC W/AUTO DIFF WBC: CPT | Mod: 91,NTX

## 2024-07-04 PROCEDURE — 25000003 PHARM REV CODE 250: Mod: NTX | Performed by: STUDENT IN AN ORGANIZED HEALTH CARE EDUCATION/TRAINING PROGRAM

## 2024-07-04 PROCEDURE — 25500020 PHARM REV CODE 255: Mod: NTX | Performed by: EMERGENCY MEDICINE

## 2024-07-04 PROCEDURE — 63600175 PHARM REV CODE 636 W HCPCS: Mod: NTX | Performed by: STUDENT IN AN ORGANIZED HEALTH CARE EDUCATION/TRAINING PROGRAM

## 2024-07-04 PROCEDURE — 94761 N-INVAS EAR/PLS OXIMETRY MLT: CPT | Mod: NTX

## 2024-07-04 PROCEDURE — 94640 AIRWAY INHALATION TREATMENT: CPT | Mod: NTX

## 2024-07-04 PROCEDURE — 94664 DEMO&/EVAL PT USE INHALER: CPT | Mod: NTX

## 2024-07-04 PROCEDURE — 36415 COLL VENOUS BLD VENIPUNCTURE: CPT | Mod: NTX | Performed by: SURGERY

## 2024-07-04 RX ORDER — HEPARIN SODIUM 5000 [USP'U]/ML
5000 INJECTION, SOLUTION INTRAVENOUS; SUBCUTANEOUS EVERY 8 HOURS
Status: DISCONTINUED | OUTPATIENT
Start: 2024-07-04 | End: 2024-07-09 | Stop reason: HOSPADM

## 2024-07-04 RX ORDER — HYDROMORPHONE HYDROCHLORIDE 1 MG/ML
0.2 INJECTION, SOLUTION INTRAMUSCULAR; INTRAVENOUS; SUBCUTANEOUS EVERY 4 HOURS PRN
Status: DISCONTINUED | OUTPATIENT
Start: 2024-07-04 | End: 2024-07-04

## 2024-07-04 RX ORDER — PANTOPRAZOLE SODIUM 40 MG/10ML
40 INJECTION, POWDER, LYOPHILIZED, FOR SOLUTION INTRAVENOUS DAILY
Status: DISCONTINUED | OUTPATIENT
Start: 2024-07-05 | End: 2024-07-08

## 2024-07-04 RX ORDER — LORAZEPAM 2 MG/ML
INJECTION INTRAMUSCULAR
Status: DISPENSED
Start: 2024-07-04 | End: 2024-07-05

## 2024-07-04 RX ORDER — ONDANSETRON HYDROCHLORIDE 2 MG/ML
4 INJECTION, SOLUTION INTRAVENOUS
Status: DISCONTINUED | OUTPATIENT
Start: 2024-07-04 | End: 2024-07-04

## 2024-07-04 RX ORDER — IPRATROPIUM BROMIDE AND ALBUTEROL SULFATE 2.5; .5 MG/3ML; MG/3ML
3 SOLUTION RESPIRATORY (INHALATION) EVERY 6 HOURS
Status: DISCONTINUED | OUTPATIENT
Start: 2024-07-04 | End: 2024-07-09 | Stop reason: HOSPADM

## 2024-07-04 RX ORDER — ACETAMINOPHEN 325 MG/1
650 TABLET ORAL EVERY 8 HOURS PRN
Status: DISCONTINUED | OUTPATIENT
Start: 2024-07-04 | End: 2024-07-05

## 2024-07-04 RX ORDER — SODIUM CHLORIDE 0.9 % (FLUSH) 0.9 %
10 SYRINGE (ML) INJECTION
Status: DISCONTINUED | OUTPATIENT
Start: 2024-07-04 | End: 2024-07-09 | Stop reason: HOSPADM

## 2024-07-04 RX ORDER — PROCHLORPERAZINE EDISYLATE 5 MG/ML
5 INJECTION INTRAMUSCULAR; INTRAVENOUS
Status: COMPLETED | OUTPATIENT
Start: 2024-07-04 | End: 2024-07-04

## 2024-07-04 RX ORDER — SODIUM CHLORIDE, SODIUM LACTATE, POTASSIUM CHLORIDE, CALCIUM CHLORIDE 600; 310; 30; 20 MG/100ML; MG/100ML; MG/100ML; MG/100ML
INJECTION, SOLUTION INTRAVENOUS CONTINUOUS
Status: DISCONTINUED | OUTPATIENT
Start: 2024-07-04 | End: 2024-07-08

## 2024-07-04 RX ORDER — ONDANSETRON HYDROCHLORIDE 2 MG/ML
4 INJECTION, SOLUTION INTRAVENOUS EVERY 6 HOURS PRN
Status: DISCONTINUED | OUTPATIENT
Start: 2024-07-04 | End: 2024-07-09 | Stop reason: HOSPADM

## 2024-07-04 RX ORDER — LORAZEPAM 2 MG/ML
1 INJECTION INTRAMUSCULAR ONCE
Status: COMPLETED | OUTPATIENT
Start: 2024-07-04 | End: 2024-07-04

## 2024-07-04 RX ORDER — LIDOCAINE HYDROCHLORIDE 10 MG/ML
1 INJECTION, SOLUTION EPIDURAL; INFILTRATION; INTRACAUDAL; PERINEURAL ONCE AS NEEDED
Status: DISCONTINUED | OUTPATIENT
Start: 2024-07-04 | End: 2024-07-09 | Stop reason: HOSPADM

## 2024-07-04 RX ORDER — LORAZEPAM 2 MG/ML
0.5 INJECTION INTRAMUSCULAR
Status: COMPLETED | OUTPATIENT
Start: 2024-07-04 | End: 2024-07-04

## 2024-07-04 RX ORDER — LIDOCAINE HYDROCHLORIDE 20 MG/ML
JELLY TOPICAL
Status: COMPLETED | OUTPATIENT
Start: 2024-07-04 | End: 2024-07-04

## 2024-07-04 RX ORDER — HYDRALAZINE HYDROCHLORIDE 20 MG/ML
5 INJECTION INTRAMUSCULAR; INTRAVENOUS EVERY 6 HOURS PRN
Status: DISCONTINUED | OUTPATIENT
Start: 2024-07-04 | End: 2024-07-09 | Stop reason: HOSPADM

## 2024-07-04 RX ORDER — MORPHINE SULFATE 4 MG/ML
4 INJECTION, SOLUTION INTRAMUSCULAR; INTRAVENOUS
Status: COMPLETED | OUTPATIENT
Start: 2024-07-04 | End: 2024-07-04

## 2024-07-04 RX ADMIN — SODIUM CHLORIDE, POTASSIUM CHLORIDE, SODIUM LACTATE AND CALCIUM CHLORIDE: 600; 310; 30; 20 INJECTION, SOLUTION INTRAVENOUS at 04:07

## 2024-07-04 RX ADMIN — PROCHLORPERAZINE EDISYLATE 5 MG: 5 INJECTION INTRAMUSCULAR; INTRAVENOUS at 10:07

## 2024-07-04 RX ADMIN — SODIUM CHLORIDE, POTASSIUM CHLORIDE, SODIUM LACTATE AND CALCIUM CHLORIDE 1000 ML: 600; 310; 30; 20 INJECTION, SOLUTION INTRAVENOUS at 10:07

## 2024-07-04 RX ADMIN — LIDOCAINE HYDROCHLORIDE 10 ML: 20 JELLY TOPICAL at 01:07

## 2024-07-04 RX ADMIN — LORAZEPAM 0.5 MG: 2 INJECTION INTRAMUSCULAR; INTRAVENOUS at 12:07

## 2024-07-04 RX ADMIN — IOHEXOL 75 ML: 350 INJECTION, SOLUTION INTRAVENOUS at 12:07

## 2024-07-04 RX ADMIN — MORPHINE SULFATE 4 MG: 4 INJECTION INTRAVENOUS at 10:07

## 2024-07-04 RX ADMIN — ACETAMINOPHEN 650 MG: 325 TABLET ORAL at 07:07

## 2024-07-04 RX ADMIN — IPRATROPIUM BROMIDE AND ALBUTEROL SULFATE 3 ML: 2.5; .5 SOLUTION RESPIRATORY (INHALATION) at 07:07

## 2024-07-04 RX ADMIN — LORAZEPAM 1 MG: 2 INJECTION INTRAMUSCULAR; INTRAVENOUS at 06:07

## 2024-07-04 NOTE — ED NOTES
Gen surg MD at bedside. This RN and MD attempted to advance NG multiple times per x-ray and unsuccessful. Per MD another nurse on surgery floor will attempt later.

## 2024-07-04 NOTE — HPI
Patient with complex medical history including HTN, HLD, HFwpEF, CAD, emphysema, GI bleed, and recent prolonged hospital admission for incarcerated inguinal hernia requiring exploratory laparotomy for ischemic bowel. She has been recovering quite well over the past few weeks but yesterday began to experience increasing abdominal pain and distention, nausea, and emesis. She does not note any inciting factors. Has not had any similar episodes since her last hospitalization.  On evaluation in the emergency room a CT scan of the abdomen and pelvis was performed revealing dilated small bowel loops with transition point in the mid-abdomen.  NG tube placement was attempted, but failed due to coiling.  Labs on admission revealed leukocytosis to 19, and a decreased GFR of 53.  General surgery has been consulted for evaluation.

## 2024-07-04 NOTE — ED NOTES
Received report and assumed care of patient at this time. Pt presents to ED w/ c/o abdominal pain. Patient is AAOx4 with a calm affect and aware of environment. Airway is open and patent, respirations are spontaneous, normal effort and rate noted. Pt denies chest pain at this time. Skin warm and dry. Movement to all extremities noted. Bed placed in low position, side rails up x 2, call light is within reach of patient. Explanation of care provided to patient, pt placed on BP, pulse-ox and cardiac monitoring. Patient offers no complaints at this time. Awaiting further MD orders and bed assignment, POC continues.

## 2024-07-04 NOTE — PLAN OF CARE
07/04/24 1519   Post-Acute Status   Post-Acute Authorization Home Health   Home Health Status Pending medical clearance/testing   Discharge Delays None known at this time   Discharge Plan   Discharge Plan A Home Health     Pt is current with [facility] and would like to continue on d/c     SW sent referral/update via [facility]      Pamela Worthington CD, MSW, LMSW, RSW   Case Management  [facility]  Email: [email or web address]

## 2024-07-04 NOTE — H&P
[facility] - Emergency Dept  General Surgery  History & Physical    Patient Name: Jessica Floyd  MRN: [id number]  Admission Date: 7/4/2024  Attending Physician: Rick Saunders MD   Primary Care Provider: Effie Olmedo MD    Patient information was obtained from patient, past medical records, and ER records.     Subjective:     Chief Complaint/Reason for Admission: abdominal pain    History of Present Illness: Patient with complex medical history including HTN, HLD, HFwpEF, CAD, emphysema, GI bleed, and recent prolonged hospital admission for incarcerated inguinal hernia requiring exploratory laparotomy for ischemic bowel. She has been recovering quite well over the past few weeks but yesterday began to experience increasing abdominal pain and distention, nausea, and emesis. She does not note any inciting factors. Has not had any similar episodes since her last hospitalization.  On evaluation in the emergency room a CT scan of the abdomen and pelvis was performed revealing dilated small bowel loops with transition point in the mid-abdomen.  NG tube placement was attempted, but failed due to coiling.  Labs on admission revealed leukocytosis to 19, and a decreased GFR of 53.  General surgery has been consulted for evaluation.    Current Facility-Administered Medications on File Prior to Encounter   Medication    mupirocin 2 % ointment    tranexamic acid (CYKLOKAPRON) 1,000 mg in sodium chloride 0.9 % 100 mL IVPB (MB+)    tranexamic acid (CYKLOKAPRON) 1,000 mg in sodium chloride 0.9 % 100 mL IVPB (MB+)     Current Outpatient Medications on File Prior to Encounter   Medication Sig    acetaminophen (TYLENOL) 650 MG TbSR Take 1 tablet (650 mg total) by mouth every 6 to 8 hours as needed (pain (mild-moderate)).    allopurinoL (ZYLOPRIM) 100 MG tablet Take 2 tablets (200 mg total) by mouth once daily.    amLODIPine (NORVASC) 5 MG tablet Take 1 tablet (5 mg total) by mouth once daily.    amoxicillin-clavulanate 875-125mg  "(AUGMENTIN) 875-125 mg per tablet Take 1 tablet by mouth every 12 (twelve) hours. (Patient not taking: Reported on 6/17/2024)    atorvastatin (LIPITOR) 80 MG tablet TAKE 1 TABLET BY MOUTH EVERY DAY    capsaicin (ZOSTRIX) 0.025 % cream Apply topically 3 (three) times daily.    celecoxib (CELEBREX) 200 MG capsule Take 1 capsule (200 mg total) by mouth daily as needed for Pain.    colchicine (COLCRYS) 0.6 mg tablet Take 1 tablet (0.6 mg total) by mouth once daily. As needed for gout    cyanocobalamin 1,000 mcg/mL injection Inject 1mL intramuscularly once weekly.    fluconazole (DIFLUCAN) 150 MG Tab Take 150 mg by mouth once. (Patient not taking: Reported on 6/17/2024)    fluticasone furoate-vilanteroL (BREO) 100-25 mcg/dose diskus inhaler Inhale 1 puff into the lungs once daily. Controller    fluticasone propion-salmeterol 115-21 mcg/dose (ADVAIR HFA) 115-21 mcg/actuation HFAA inhaler Inhale 2 puffs into the lungs.    fluticasone propionate (FLONASE) 50 mcg/actuation nasal spray SPRAY 2 pumps INTO EACH NOSTRIL ONCE DAILY    furosemide (LASIX) 20 MG tablet Take 1 tablet (20 mg total) by mouth daily as needed (Leg swelling, SOB, Weight gain 3lb in 1 day or 5 lbs in 2 days.).    hydroCHLOROthiazide (HYDRODIURIL) 12.5 MG Tab Take 1 tablet (12.5 mg total) by mouth once daily.    HYDROcodone-acetaminophen (NORCO) 5-325 mg per tablet Take 1 tablet by mouth every 6 (six) hours as needed for Pain.    INCRUSE ELLIPTA 62.5 mcg/actuation inhalation capsule Inhale 1 puff into the lungs. (Patient not taking: Reported on 6/17/2024)    LIDOcaine (LMX) 4 % cream Apply topically 2 (two) times daily as needed (foot pain).    LIDOcaine-prilocaine (EMLA) cream Apply topically as needed.    melatonin (MELATIN) 3 mg tablet Take 2 tablets (6 mg total) by mouth nightly as needed for Insomnia.    montelukast (SINGULAIR) 10 mg tablet TAKE 1 TABLET BY MOUTH EVERY DAY IN THE EVENING    needle, major, 30 gauge 30 gauge x 1/2" Ndle Use a new needle " "once, use new needle daily for the first week, then a new needle once weekly for 8 weeks.    ondansetron (ZOFRAN-ODT) 4 MG TbDL Take 1 tablet (4 mg total) by mouth every 6 (six) hours as needed (nausea/vomiting).    ORTHOVISC 30 mg/2 mL     pantoprazole (PROTONIX) 40 MG tablet Take 1 tablet (40 mg total) by mouth 2 (two) times daily.    QUEtiapine (SEROQUEL) 50 MG tablet Take 1 tablet (50 mg total) by mouth every evening.    sodium chloride (SALINE MIST NASL) Apply to each nostril daily for dryness    sodium chloride-aloe vera (SALINE NASAL, ALOE VERA,) Gel Apply to each nostril daily for dryness    syringe with needle (SYRINGE 3CC/22GX1") 3 mL 22 gauge x 1" Syrg 1 mL by Misc.(Non-Drug; Combo Route) route every 30 days.    tiotropium bromide (SPIRIVA RESPIMAT) 2.5 mcg/actuation inhaler Inhale 2 puffs into the lungs Daily.    VENOFER 100 mg iron/5 mL injection  (Patient not taking: Reported on 6/17/2024)       Review of patient's allergies indicates:  No Known Allergies    Past Medical History:   Diagnosis Date    Allergic rhinitis     Amblyopia     Carotid stenosis     Coronary atherosclerosis     [facility] 6/2014: 20% mLAD, 50% mCx, 20%, mRCA    Dyslipidemia     ESBL (extended spectrum beta-lactamase) producing bacteria infection     UTI    Hypertension     Nausea and vomiting 05/26/2017    Pulmonary emphysema 10/28/2023    SAH (subarachnoid hemorrhage) 08/24/2016 1999, s/p clipping    Subarachnoid hemorrhage due to ruptured aneurysm 1999     Past Surgical History:   Procedure Laterality Date    ADENOIDECTOMY      ANTERIOR CRUCIATE LIGAMENT REPAIR  2012    APPENDECTOMY      CATARACT EXTRACTION W/  INTRAOCULAR LENS IMPLANT Right 11/07/2022    Procedure: EXTRACTION, CATARACT, WITH IOL INSERTION;  Surgeon: Higinio Cristina MD;  Location: [facility];  Service: Ophthalmology;  Laterality: Right;    CATARACT EXTRACTION W/  INTRAOCULAR LENS IMPLANT Left 11/21/2022    Procedure: EXTRACTION, CATARACT, WITH IOL INSERTION;  " Surgeon: Higinio Cristina MD;  Location: [facility];  Service: Ophthalmology;  Laterality: Left;    CEREBRAL ANEURYSM REPAIR  1999    clip    COLONOSCOPY N/A 5/16/2024    Procedure: COLONOSCOPY;  Surgeon: Rich Syed MD;  Location: [facility] (2ND FLR);  Service: Endoscopy;  Laterality: N/A;    DENTAL SURGERY      DIAGNOSTIC LAPAROSCOPY N/A 5/17/2024    Procedure: LAPAROSCOPY, DIAGNOSTIC;  Surgeon: Ruben Oscar MD;  [address];  Service: General;  Laterality: N/A;    ESOPHAGOGASTRODUODENOSCOPY N/A 5/15/2024    Procedure: EGD (ESOPHAGOGASTRODUODENOSCOPY);  Surgeon: Rich Syed MD;  [facility]  Service: Endoscopy;  Laterality: N/A;    EXCISION, SMALL INTESTINE N/A 5/17/2024    Procedure: EXCISION, SMALL INTESTINE;  Surgeon: Ruben Oscar MD;  Location: [address];  Service: General;  Laterality: N/A;    EYE SURGERY Bilateral     cataract removal and lens implants    HIP ARTHROPLASTY Left 05/28/2023    Procedure: TOTAL HIP ARTHROPLASTY;  Surgeon: Juan Wan MD;  [address];  Service: Orthopedics;  Laterality: Left;    LAPAROTOMY, EXPLORATORY N/A 5/17/2024    Procedure: LAPAROTOMY, EXPLORATORY;  Surgeon: Ruben Oscar MD;  Location: [address];  Service: General;  Laterality: N/A;    REPAIR, HERNIA, INGUINAL Bilateral 5/17/2024    Procedure: REPAIR, HERNIA, INGUINAL;  Surgeon: Ruben Oscar MD;  [facility];  Service: General;  Laterality: Bilateral;    TONSILLECTOMY       Family History       Problem Relation (Age of Onset)    Alcohol abuse Father    Aneurysm Mother    Gout Brother    Heart disease Brother    Hypertension Mother, Father    Kidney disease Brother    No Known Problems Daughter, Daughter, Daughter          Tobacco Use    Smoking status: Former     Current packs/day: 0.00     Average packs/day: 0.5 packs/day for 20.0 years (10.0 ttl pk-yrs)     Types: Cigarettes     Start date:  1979     Quit date: 1999     Years since quittin.5     Passive exposure: Past    Smokeless tobacco: Never   Substance and Sexual Activity    Alcohol use: Yes     Alcohol/week: 7.0 standard drinks of alcohol     Types: 7 Glasses of wine per week     Comment: One glass of Red wine prior to dinner    Drug use: No    Sexual activity: Yes     Partners: Male     Review of Systems   Constitutional: Negative.    HENT: Negative.     Respiratory: Negative.     Cardiovascular: Negative.    Gastrointestinal:  Positive for abdominal distention, abdominal pain, nausea and vomiting.   Endocrine: Negative.    Genitourinary: Negative.    Musculoskeletal: Negative.    Neurological: Negative.    Hematological: Negative.    Psychiatric/Behavioral: Negative.       Objective:     Vital Signs (Most Recent):  Temp: 98.2 °F (36.8 °C) (24 1102)  Pulse: 91 (24 1503)  Resp: 20 (24 1503)  BP: 130/65 (24 1503)  SpO2: 95 % (24 1503) Vital Signs (24h Range):  Temp:  [98.2 °F (36.8 °C)-98.3 °F (36.8 °C)] 98.2 °F (36.8 °C)  Pulse:  [] 91  Resp:  [16-24] 20  SpO2:  [93 %-96 %] 95 %  BP: (125-168)/(65-80) 130/65     Weight: 52.2 kg (115 lb)  Body mass index is 20.37 kg/m².     Physical Exam  Vitals and nursing note reviewed.   Constitutional:       Appearance: She is ill-appearing.      Comments: Thin female.  Uncomfortable   HENT:      Head: Normocephalic.      Nose:      Comments: NG tube to Nare.  Not placed to suction.     Mouth/Throat:      Mouth: Mucous membranes are moist.      Pharynx: Oropharynx is clear.   Eyes:      General: No scleral icterus.     Extraocular Movements: Extraocular movements intact.      Conjunctiva/sclera: Conjunctivae normal.   Cardiovascular:      Rate and Rhythm: Normal rate.      Pulses: Normal pulses.   Pulmonary:      Effort: Pulmonary effort is normal. No respiratory distress.      Breath sounds: No wheezing.   Abdominal:      General: There is distension.       Tenderness: There is abdominal tenderness. There is no guarding or rebound.      Comments: Abdomen is moderately distended with diffuse tenderness.  No significant signs of peritonitis at this time.  Midline incision is well healed.   Genitourinary:     Comments: No evidence of inguinal hernia recurrence.  Musculoskeletal:         General: No swelling or tenderness. Normal range of motion.      Cervical back: Normal range of motion.   Skin:     General: Skin is warm and dry.      Coloration: Skin is not jaundiced or pale.   Neurological:      General: No focal deficit present.      Mental Status: She is alert and oriented to person, place, and time.   Psychiatric:         Mood and Affect: Mood normal.            I have reviewed all pertinent lab results within the past 24 hours.  CBC:   Recent Labs   Lab 07/04/24  1008   WBC 19.87*   RBC 3.32*   HGB 10.4*   HCT 32.9*      MCV 99*   MCH 31.3*   MCHC 31.6*     CMP:   Recent Labs   Lab 07/04/24  1008   *   CALCIUM 9.7   ALBUMIN 3.4*   PROT 8.0      K 3.7   CO2 22*   CL 98   BUN 20   CREATININE 1.1   ALKPHOS 91   ALT 13   AST 26   BILITOT 0.7       Significant Diagnostics:  I have reviewed all pertinent imaging results/findings within the past 24 hours.    Assessment/Plan:     Bowel obstruction  Patient with complex medical history including HTN, HLD, HFwpEF, CAD, emphysema, GI bleed, and recent prolonged hospital admission for incarcerated inguinal hernia requiring exploratory laparotomy for ischemic bowel.  Now presenting with signs/symptoms of partial small bowel obstruction.  CT scan with evidence of what seems to be an adhesive band with a midabdomen.  No peritonitic signs right now.    - admit to General surgery  - NPO  - patient needs NG tube.  I attempted placement myself at bedside, but tube coiled in the distal esophagus.  We will give her a little while to rest and once she was on the floor have one of the more experienced nurses attempt  placement  - maintenance fluids  - p.r.n. hydralazine  - multimodal pain control  - home meds  - Protonix  - DVT prophylaxis with heparin  - we will continue fluid resuscitation as needed.  Vitals are stable right now  - we will repeat a chest x-ray this evening due to worsening respiratory status and concerns for possible aspiration.      VTE Risk Mitigation (From admission, onward)           Ordered     heparin (porcine) injection 5,000 Units  Every 8 hours         07/04/24 1536     IP VTE HIGH RISK PATIENT  Once         07/04/24 1536     Place sequential compression device  Until discontinued         07/04/24 1536                    Michael Bhakta MD  General Surgery  [facility] - Emergency Dept        I have personally performed a detailed history and physical examination on this patient. My findings are summarized in the resident's note included in the record.   NG decompression in anticipation of a GGC

## 2024-07-04 NOTE — ED PROVIDER NOTES
Encounter Date: 7/4/2024       History     Chief Complaint   Patient presents with    Abdominal Pain     Via EMS from with abd pain starting yesterday. Believes she ate something that gave her food poisoning causing N/V but has hx of GI bleed that required repair 2 months ago. Pt denies CP, SOB, or dark stools. Arrives on 2L NC that she wears PRN at home.      Patient was a 73-year-old female with a past medical history of CAD, hypertension, pulmonary emphysema that presents to emergency department with abdominal pain.  Patient states that she was recently here in the emergency department with complicated hospital stay.  States that she had a GI bleed treated with coil, this was complicated by small-bowel obstruction treated with abdominal laparotomy with bowel resection.  Patient ultimately was discharged without any symptoms.  Or last 24 hours she was had progressive worsening abdominal pain.  States that she initially started having belching with abdominal discomfort however this has progressed to severe pain with vomiting.  States that her vomit it does appear dark and similar to what it looked like in the NG tube previously.  States that she was passing gas however has not had a bowel movement 24 hours.    The history is provided by the patient and medical records.     Review of patient's allergies indicates:   Allergen Reactions    Hydromorphone Anxiety, Other (See Comments) and Hallucinations     Severe agitation     Past Medical History:   Diagnosis Date    Allergic rhinitis     Amblyopia     Carotid stenosis     Coronary atherosclerosis     Outside [facility] 6/2014: 20% mLAD, 50% mCx, 20%, mRCA    Dyslipidemia     ESBL (extended spectrum beta-lactamase) producing bacteria infection     UTI    Hypertension     Nausea and vomiting 05/26/2017    Pulmonary emphysema 10/28/2023    SAH (subarachnoid hemorrhage) 08/24/2016 1999, s/p clipping    Subarachnoid hemorrhage due to ruptured aneurysm 1999     Past Surgical  History:   Procedure Laterality Date    ADENOIDECTOMY      ANTERIOR CRUCIATE LIGAMENT REPAIR  2012    APPENDECTOMY      CATARACT EXTRACTION W/  INTRAOCULAR LENS IMPLANT Right 11/07/2022    Procedure: EXTRACTION, CATARACT, WITH IOL INSERTION;  Surgeon: Higinio Cristina MD;  Location: [facility] OR;  Service: Ophthalmology;  Laterality: Right;    CATARACT EXTRACTION W/  INTRAOCULAR LENS IMPLANT Left 11/21/2022    Procedure: EXTRACTION, CATARACT, WITH IOL INSERTION;  Surgeon: Higinio Cristina MD;  Location: [facility] OR;  Service: Ophthalmology;  Laterality: Left;    CEREBRAL ANEURYSM REPAIR  1999    clip    COLONOSCOPY N/A 5/16/2024    Procedure: COLONOSCOPY;  Surgeon: Rich Syed MD;  Location: [facility] ENDO (2ND FLR);  Service: Endoscopy;  Laterality: N/A;    DENTAL SURGERY      DIAGNOSTIC LAPAROSCOPY N/A 5/17/2024    Procedure: LAPAROSCOPY, DIAGNOSTIC;  Surgeon: Ruben Oscar MD;  Location: [facility] OR 2ND FLR;  Service: General;  Laterality: N/A;    ESOPHAGOGASTRODUODENOSCOPY N/A 5/15/2024    Procedure: EGD (ESOPHAGOGASTRODUODENOSCOPY);  Surgeon: Rich Syed MD;  Location: [facility] (2ND FLR);  Service: Endoscopy;  Laterality: N/A;    EXCISION, SMALL INTESTINE N/A 5/17/2024    Procedure: EXCISION, SMALL INTESTINE;  Surgeon: Ruben Oscar MD;  Location: [facility] OR 2ND FLR;  Service: General;  Laterality: N/A;    EYE SURGERY Bilateral     cataract removal and lens implants    HIP ARTHROPLASTY Left 05/28/2023    Procedure: TOTAL HIP ARTHROPLASTY;  Surgeon: Juan Wan MD;  [facility];  Service: Orthopedics;  Laterality: Left;    LAPAROTOMY, EXPLORATORY N/A 5/17/2024    Procedure: LAPAROTOMY, EXPLORATORY;  Surgeon: Ruben Oscar MD;  [facility];  Service: General;  Laterality: N/A;    REPAIR, HERNIA, INGUINAL Bilateral 5/17/2024    Procedure: REPAIR, HERNIA, INGUINAL;  Surgeon: Ruben Oscar MD;  Location: [facility] OR 2ND FLR;  Service: General;  Laterality:  Bilateral;    TONSILLECTOMY       Family History   Problem Relation Name Age of Onset    Hypertension Mother      Aneurysm Mother      Hypertension Father      Alcohol abuse Father      Kidney disease Brother      Heart disease Brother      Gout Brother      No Known Problems Daughter Hannah     No Known Problems Daughter Aby     No Known Problems Daughter Diya      Social History     Tobacco Use    Smoking status: Former     Current packs/day: 0.00     Average packs/day: 0.5 packs/day for 20.0 years (10.0 ttl pk-yrs)     Types: Cigarettes     Start date: 1979     Quit date: 1999     Years since quittin.5     Passive exposure: Past    Smokeless tobacco: Never   Substance Use Topics    Alcohol use: Yes     Alcohol/week: 7.0 standard drinks of alcohol     Types: 7 Glasses of wine per week     Comment: One glass of Red wine prior to dinner    Drug use: No     Review of Systems  ROS negative except as noted in HPI    Physical Exam     Initial Vitals [24 0943]   BP Pulse Resp Temp SpO2   134/80 93 (!) 24 98.3 °F (36.8 °C) (!) 93 %      MAP       --         Vitals:    24 0712 24 0808 24 0813 24 0815   BP:  137/70     BP Location:  Right arm     Patient Position:  Lying     Pulse: 73 (!) 44 86    Resp:  18 19    Temp:  99.1 °F (37.3 °C)     TempSrc:  Oral     SpO2:  (!) 93% (!) 94% (!) 94%   Weight:         Physical Exam    Nursing note and vitals reviewed.  Constitutional: She appears distressed (Secondary to pain).   HENT:   Head: Normocephalic.   Right Ear: External ear normal.   Left Ear: External ear normal.   Nose: Nose normal.   Mouth/Throat: Mucous membranes are dry.   Eyes: Conjunctivae are normal.   Cardiovascular:  Regular rhythm, normal heart sounds and intact distal pulses.   Tachycardia present.         Pulmonary/Chest: Breath sounds normal. No respiratory distress.   Abdominal: She exhibits distension. There is abdominal tenderness. There is guarding. There is  no rebound.   Musculoskeletal:         General: No edema.     Neurological: She is alert and oriented to person, place, and time. She has normal strength.   Skin: Skin is warm. Capillary refill takes less than 2 seconds.   Psychiatric: She has a normal mood and affect.         ED Course   Procedures  Labs Reviewed   CBC W/ AUTO DIFFERENTIAL - Abnormal; Notable for the following components:       Result Value    WBC 19.87 (*)     RBC 3.32 (*)     Hemoglobin 10.4 (*)     Hematocrit 32.9 (*)     MCV 99 (*)     MCH 31.3 (*)     MCHC 31.6 (*)     RDW 22.6 (*)     Immature Granulocytes 2.0 (*)     Gran # (ANC) 14.3 (*)     Immature Grans (Abs) 0.40 (*)     Lymph # 0.8 (*)     Mono # 4.2 (*)     Lymph % 3.8 (*)     Mono % 20.9 (*)     All other components within normal limits   COMPREHENSIVE METABOLIC PANEL - Abnormal; Notable for the following components:    CO2 22 (*)     Glucose 128 (*)     Albumin 3.4 (*)     eGFR 52.7 (*)     Anion Gap 17 (*)     All other components within normal limits   URINALYSIS, REFLEX TO URINE CULTURE - Abnormal; Notable for the following components:    Specific Gravity, UA >1.030 (*)     Protein, UA 1+ (*)     All other components within normal limits    Narrative:     Specimen Source->Urine   B-TYPE NATRIURETIC PEPTIDE - Abnormal; Notable for the following components:     (*)     All other components within normal limits   TROPONIN I   PROTIME-INR   MAGNESIUM   URINALYSIS MICROSCOPIC    Narrative:     Specimen Source->Urine   TYPE & SCREEN   ISTAT LACTATE        ECG Results              EKG 12-lead (Final result)        Collection Time Result Time QRS Duration OHS QTC Calculation    07/04/24 09:52:11 07/04/24 13:41:21 86 424                     Final result by Interface, Lab In [facility] (07/04/24 13:41:28)                   Narrative:    Test Reason : R10.9,    Vent. Rate : 095 BPM     Atrial Rate : 095 BPM     P-R Int : 138 ms          QRS Dur : 086 ms      QT Int : 338 ms       P-R-T  Axes : 000 020 187 degrees     QTc Int : 424 ms    Sinus rhythm with Premature atrial complexes with Aberrant conduction  ST and T wave abnormality, consider lateral ischemia  Abnormal ECG  When compared with ECG of 14-MAY-2024 12:36,  No significant change was found    Confirmed by Lexie Ricardo MD ([id number]) on 7/4/2024 1:41:20 PM    Referred By: [email or web address]   SELF           Confirmed By:Lexie Ricardo MD                                  Imaging Results              X-Ray Abdomen AP 1 View (KUB) (Final result)  Result time 07/04/24 16:37:42      Final result by Jose An DO (07/04/24 16:37:42)                   Impression:      Nasogastric tube as above.      Electronically signed by: Jose An  Date:    07/04/2024  Time:    16:37               Narrative:    EXAMINATION:  XR ABDOMEN AP 1 VIEW    CLINICAL HISTORY:  Encounter for fitting and adjustment of other gastrointestinal appliance and device    TECHNIQUE:  AP View(s) of the abdomen was performed.    COMPARISON:  Radiograph from earlier today.    FINDINGS:  There is a nasogastric tube.  The tip remains above the diaphragm.  Position is not significantly changed from prior.  The bowel gas pattern is nonspecific.  Lung bases are clear.  Osseous structures demonstrate degenerative changes.                                       XR NG/OG tube placement check, non-radiologist performed (Final result)  Result time 07/04/24 16:35:38   Procedure changed from X-Ray Abdomen AP 1 View (KUB)     Final result by Jose An DO (07/04/24 16:35:38)                   Impression:      Nasogastric tube as above.      Electronically signed by: Jose An  Date:    07/04/2024  Time:    16:35               Narrative:    EXAMINATION:  XR NG/OG TUBE PLACEMENT CHECK, NON-RADIOLOGIST PERFORMED    CLINICAL HISTORY:  NGT;  Encounter for fitting and adjustment of other gastrointestinal appliance and device    TECHNIQUE:  AP radiograph of the  abdomen.    COMPARISON:  05/25/2024.    FINDINGS:  There is a nasogastric tube with the tip above the diaphragm.  The partially visualized bowel gas pattern is nonspecific.  The lung bases are clear.  The cardiac silhouette is unremarkable.  There are vascular calcifications.  Osseous structures demonstrate degenerative changes.                                       CT Abdomen Pelvis With IV Contrast NO Oral Contrast (Final result)  Result time 07/04/24 14:16:58      Final result by Marquis Dean MD (07/04/24 14:16:58)                   Impression:      Findings of high-grade small bowel obstruction with transition point in the right mid abdomen.    Small amount of ascites.    Additional findings as above.    Initial findings discussed with Dr. Bernard by Dr. Liu on 07/04/2024 at 11:26      Electronically signed by: Marquis Dean MD  Date:    07/04/2024  Time:    14:16               Narrative:    EXAMINATION:  CT ABDOMEN PELVIS WITH IV CONTRAST    CLINICAL HISTORY:  Abdominal abscess/infection suspected;    TECHNIQUE:  Low dose axial images, sagittal and coronal reformations were obtained from the lung bases to the pubic symphysis following the IV administration of 75 mL of Omnipaque 350 .  Oral contrast was not given.    COMPARISON:  Abdomen x-ray dated 05/25/2024    CT dated 05/17/2024.    FINDINGS:  There are no pleural effusions.  There is no evidence of a pneumothorax.  There are airspace opacities in the left lung base.    The heart is unremarkable.  There is normal tapering of the abdominal aorta.  There are extensive calcifications in the abdominal aorta and its branch vessels.  The celiac trunk, SMA, and CORRINE remain patent.  The portal veins and mesenteric veins are within normal limits.  The IVC and the remainder of the venous structures are within normal limits.    There is no evidence of lymphadenopathy in the abdomen or pelvis.    There is fluid noted in the distal aspect of the esophagus.  The  stomach is unremarkable.  The duodenum is within normal limits.  There is distention of the small bowel loops with multiple differential air-fluid levels.  There is transition into normal small bowel loops in the right of midline.    The appendix is not visualized.  There are no secondary findings of acute appendicitis.  The large bowel loops are unremarkable.    There is a 2.7 cm simple cyst in the left hepatic lobe.  There are additional low-attenuation lesions in the liver.  The gallbladder is unremarkable.  The biliary tree is within normal limits.  The spleen is unremarkable.  There is mild dilatation of the main pancreatic duct.  The pancreas is otherwise unremarkable.  The adrenal glands are unremarkable.    There are multiple low-attenuation lesions in both kidneys, too small complete characterization.  The ureters and urinary bladder are unremarkable.  The patient appears status post hysterectomy.    There is small amount of perihepatic ascites.  There is no evidence of free air.  There is no evidence of pneumatosis.  No portal venous air is identified.    The psoas margins are unremarkable.  The abdominal wall is unremarkable.  The changes of prior left hip arthroplasty.  There are degenerative changes in the osseous structures.  There is grade 1 anterolisthesis of L4 on L5.  There are chronic compression deformities of the T12 and L3 vertebral bodies.                                       X-Ray Chest AP Portable (Final result)  Result time 07/04/24 13:59:27      Final result by Jose An DO (07/04/24 13:59:27)                   Impression:      No acute abnormality.      Electronically signed by: Jose An  Date:    07/04/2024  Time:    13:59               Narrative:    EXAMINATION:  XR CHEST AP PORTABLE    CLINICAL HISTORY:  Chest Pain;    TECHNIQUE:  Single frontal view of the chest was performed.    COMPARISON:  05/23/2024.    FINDINGS:  The lungs are well expanded and clear. No focal  opacities are seen. The pleural spaces are clear. The cardiac silhouette is unremarkable.  There are calcifications of the aortic arch.  The visualized osseous structures demonstrate degenerative changes.                                       Medications   LIDOcaine (PF) 10 mg/ml (1%) injection 10 mg (10 mg Intradermal Not Given 7/6/24 0526)   sodium chloride 0.9% flush 10 mL (has no administration in time range)   lactated ringers infusion ( Intravenous New Bag 7/5/24 1257)   heparin (porcine) injection 5,000 Units (5,000 Units Subcutaneous Not Given 7/6/24 0600)   ondansetron injection 4 mg (has no administration in time range)   albuterol-ipratropium 2.5 mg-0.5 mg/3 mL nebulizer solution 3 mL (3 mLs Nebulization Given 7/6/24 0813)   pantoprazole injection 40 mg (40 mg Intravenous Given 7/6/24 0817)   hydrALAZINE injection 5 mg (has no administration in time range)   LORazepam (ATIVAN) 2 mg/mL injection (0 mg  Hold 7/4/24 1845)   ammonium lactate 12 % lotion (has no administration in time range)   allopurinoL tablet 200 mg (200 mg Oral Given 7/6/24 0818)   hydrOXYzine HCL tablet 25 mg (25 mg Per NG tube Not Given 7/6/24 0903)   acetaminophen tablet 650 mg (650 mg Oral Given 7/6/24 0340)   morphine injection 4 mg (4 mg Intravenous Given 7/4/24 1024)   lactated ringers bolus 1,000 mL (0 mLs Intravenous Stopped 7/4/24 1126)   prochlorperazine injection Soln 5 mg (5 mg Intravenous Given 7/4/24 1025)   iohexoL (OMNIPAQUE 350) injection 75 mL (75 mLs Intravenous Given 7/4/24 1200)   LORazepam injection 0.5 mg (0.5 mg Intravenous Given 7/4/24 1259)   LIDOcaine HCl 2% urojet (10 mLs Mucous Membrane Given 7/4/24 1300)   LORazepam injection 1 mg (1 mg Intravenous Given 7/4/24 1830)   potassium chloride 10 mEq in 100 mL IVPB (10 mEq Intravenous New Bag 7/6/24 0818)   magnesium sulfate 2g in water 50mL IVPB (premix) (0 g Intravenous Stopped 7/6/24 0758)   LORazepam injection 0.5 mg (0.5 mg Intravenous Given 7/6/24 5677)      Medical Decision Making  Patient was a 73-year-old female with a past medical history of CAD, hypertension, pulmonary emphysema that presents to emergency department with abdominal pain    On initial evaluation patient was tachycardic, and hypertensive, patient is cooperative with history and physical examination.     Differential for patient's presentation is broad.  Patient his elderly with multiple comorbidities.  Considering acute mesenteric ischemia, small-bowel obstruction, acute perforation, intra-abdominal abscess.  Patient has nausea and vomiting with nausea that has dark. no evidence of GI bleed.  See ED course below.    Multiple attempts were made to place a NG tube in the emergency department with nursing and with surgery at bedside.  Patient will be admitted to general surgery for small-bowel obstruction.    Amount and/or Complexity of Data Reviewed  Labs: ordered. Decision-making details documented in ED Course.  Radiology: ordered. Decision-making details documented in ED Course.  ECG/medicine tests:  Decision-making details documented in ED Course.    Risk  Prescription drug management.  Decision regarding hospitalization.              Attending Attestation:   Physician Attestation Statement for Resident:  As the supervising MD   Physician Attestation Statement: I have personally seen and examined this patient.   I agree with the above history.  -:   As the supervising MD I agree with the above PE.     As the supervising MD I agree with the above treatment, course, plan, and disposition.                Attending ED Notes:   STAFF ATTENDING PHYSICIAN NOTE:  I have individually/jointly evaluated Jessica Floyd and discussed their ED management with the resident physician. I have also reviewed their notes, assessments, and procedures documented.  I was present during all critical portions of any procedure(s) performed on Jessica Floyd.   ____________________  Renan Carson MD, [facility]  Emergency Medicine  Staff        ED Course as of 07/06/24 1120   Thu Jul 04, 2024   1021 WBC(!): 19.87 [TK]   1025 POC Lactate: 1.73 [TK]   1025 EKG 12-lead  Independent interpretation:  Normal sinus rhythm with nonspecific ST changes, no ST elevations or depressions. [TK]   1054 Creatinine: 1.1 [TK]   1054 BUN: 20 [TK]   1215 CT Abdomen Pelvis With IV Contrast NO Oral Contrast  Contacted by radiology with concerning findings for small-bowel obstruction with adhesion.  Discussed with on-call general surgery who agree and will evaluate the patient.  NG tube ordered. [TK]      ED Course User Index  [TK] Shahida Bernard DO                           Clinical Impression:  Final diagnoses:  [R10.9] Abdominal pain  [Z46.59] Encounter for nasogastric (NG) tube placement  [K56.609] Small bowel obstruction (Primary)  [F41.9] Anxiety          ED Disposition Condition    Admit                 Shahida Bernard DO  Resident  07/04/24 1517       Roshan Carson MD  07/06/24 1120

## 2024-07-04 NOTE — ASSESSMENT & PLAN NOTE
Patient with complex medical history including HTN, HLD, HFwpEF, CAD, emphysema, GI bleed, and recent prolonged hospital admission for incarcerated inguinal hernia requiring exploratory laparotomy for ischemic bowel.  Now presenting with signs/symptoms of partial small bowel obstruction.  CT scan with evidence of what seems to be an adhesive band with a midabdomen.  No peritonitic signs right now.    - admit to General surgery  - NPO  - patient needs NG tube.  I attempted placement myself at bedside, but tube coiled in the distal esophagus.  We will give her a little while to rest and once she was on the floor have one of the more experienced nurses attempt placement  - maintenance fluids  - p.r.n. hydralazine  - multimodal pain control  - home meds  - Protonix  - DVT prophylaxis with heparin  - we will continue fluid resuscitation as needed.  Vitals are stable right now  - we will repeat a chest x-ray this evening due to worsening respiratory status and concerns for possible aspiration.

## 2024-07-04 NOTE — SUBJECTIVE & OBJECTIVE
Current Facility-Administered Medications on File Prior to Encounter   Medication    mupirocin 2 % ointment    tranexamic acid (CYKLOKAPRON) 1,000 mg in sodium chloride 0.9 % 100 mL IVPB (MB+)    tranexamic acid (CYKLOKAPRON) 1,000 mg in sodium chloride 0.9 % 100 mL IVPB (MB+)     Current Outpatient Medications on File Prior to Encounter   Medication Sig    acetaminophen (TYLENOL) 650 MG TbSR Take 1 tablet (650 mg total) by mouth every 6 to 8 hours as needed (pain (mild-moderate)).    allopurinoL (ZYLOPRIM) 100 MG tablet Take 2 tablets (200 mg total) by mouth once daily.    amLODIPine (NORVASC) 5 MG tablet Take 1 tablet (5 mg total) by mouth once daily.    amoxicillin-clavulanate 875-125mg (AUGMENTIN) 875-125 mg per tablet Take 1 tablet by mouth every 12 (twelve) hours. (Patient not taking: Reported on 6/17/2024)    atorvastatin (LIPITOR) 80 MG tablet TAKE 1 TABLET BY MOUTH EVERY DAY    capsaicin (ZOSTRIX) 0.025 % cream Apply topically 3 (three) times daily.    celecoxib (CELEBREX) 200 MG capsule Take 1 capsule (200 mg total) by mouth daily as needed for Pain.    colchicine (COLCRYS) 0.6 mg tablet Take 1 tablet (0.6 mg total) by mouth once daily. As needed for gout    cyanocobalamin 1,000 mcg/mL injection Inject 1mL intramuscularly once weekly.    fluconazole (DIFLUCAN) 150 MG Tab Take 150 mg by mouth once. (Patient not taking: Reported on 6/17/2024)    fluticasone furoate-vilanteroL (BREO) 100-25 mcg/dose diskus inhaler Inhale 1 puff into the lungs once daily. Controller    fluticasone propion-salmeterol 115-21 mcg/dose (ADVAIR HFA) 115-21 mcg/actuation HFAA inhaler Inhale 2 puffs into the lungs.    fluticasone propionate (FLONASE) 50 mcg/actuation nasal spray SPRAY 2 pumps INTO EACH NOSTRIL ONCE DAILY    furosemide (LASIX) 20 MG tablet Take 1 tablet (20 mg total) by mouth daily as needed (Leg swelling, SOB, Weight gain 3lb in 1 day or 5 lbs in 2 days.).    hydroCHLOROthiazide (HYDRODIURIL) 12.5 MG Tab Take 1  "tablet (12.5 mg total) by mouth once daily.    HYDROcodone-acetaminophen (NORCO) 5-325 mg per tablet Take 1 tablet by mouth every 6 (six) hours as needed for Pain.    INCRUSE ELLIPTA 62.5 mcg/actuation inhalation capsule Inhale 1 puff into the lungs. (Patient not taking: Reported on 6/17/2024)    LIDOcaine (LMX) 4 % cream Apply topically 2 (two) times daily as needed (foot pain).    LIDOcaine-prilocaine (EMLA) cream Apply topically as needed.    melatonin (MELATIN) 3 mg tablet Take 2 tablets (6 mg total) by mouth nightly as needed for Insomnia.    montelukast (SINGULAIR) 10 mg tablet TAKE 1 TABLET BY MOUTH EVERY DAY IN THE EVENING    needle, disp, 30 gauge 30 gauge x 1/2" Ndle Use a new needle once, use new needle daily for the first week, then a new needle once weekly for 8 weeks.    ondansetron (ZOFRAN-ODT) 4 MG TbDL Take 1 tablet (4 mg total) by mouth every 6 (six) hours as needed (nausea/vomiting).    ORTHOVISC 30 mg/2 mL     pantoprazole (PROTONIX) 40 MG tablet Take 1 tablet (40 mg total) by mouth 2 (two) times daily.    QUEtiapine (SEROQUEL) 50 MG tablet Take 1 tablet (50 mg total) by mouth every evening.    sodium chloride (SALINE MIST NASL) Apply to each nostril daily for dryness    sodium chloride-aloe vera (SALINE NASAL, ALOE VERA,) Gel Apply to each nostril daily for dryness    syringe with needle (SYRINGE 3CC/22GX1") 3 mL 22 gauge x 1" Syrg 1 mL by Misc.(Non-Drug; Combo Route) route every 30 days.    tiotropium bromide (SPIRIVA RESPIMAT) 2.5 mcg/actuation inhaler Inhale 2 puffs into the lungs Daily.    VENOFER 100 mg iron/5 mL injection  (Patient not taking: Reported on 6/17/2024)       Review of patient's allergies indicates:  No Known Allergies    Past Medical History:   Diagnosis Date    Allergic rhinitis     Amblyopia     Carotid stenosis     Coronary atherosclerosis     [facility] 6/2014: 20% mLAD, 50% mCx, 20%, mRCA    Dyslipidemia     ESBL (extended spectrum beta-lactamase) producing bacteria " infection     UTI    Hypertension     Nausea and vomiting 05/26/2017    Pulmonary emphysema 10/28/2023    SAH (subarachnoid hemorrhage) 08/24/2016 1999, s/p clipping    Subarachnoid hemorrhage due to ruptured aneurysm 1999     Past Surgical History:   Procedure Laterality Date    ADENOIDECTOMY      ANTERIOR CRUCIATE LIGAMENT REPAIR  2012    APPENDECTOMY      CATARACT EXTRACTION W/  INTRAOCULAR LENS IMPLANT Right 11/07/2022    Procedure: EXTRACTION, CATARACT, WITH IOL INSERTION;  Surgeon: Higinio Cristina MD;  Location: [facility] OR;  Service: Ophthalmology;  Laterality: Right;    CATARACT EXTRACTION W/  INTRAOCULAR LENS IMPLANT Left 11/21/2022    Procedure: EXTRACTION, CATARACT, WITH IOL INSERTION;  Surgeon: Higinio Cristina MD;  Location: [facility] OR;  Service: Ophthalmology;  Laterality: Left;    CEREBRAL ANEURYSM REPAIR  1999    clip    COLONOSCOPY N/A 5/16/2024    Procedure: COLONOSCOPY;  Surgeon: Rich Syed MD;  Location: [facility] (2ND FLR);  Service: Endoscopy;  Laterality: N/A;    DENTAL SURGERY      DIAGNOSTIC LAPAROSCOPY N/A 5/17/2024    Procedure: LAPAROSCOPY, DIAGNOSTIC;  Surgeon: Ruben Oscar MD;  Location: [facility] OR 2ND FLR;  Service: General;  Laterality: N/A;    ESOPHAGOGASTRODUODENOSCOPY N/A 5/15/2024    Procedure: EGD (ESOPHAGOGASTRODUODENOSCOPY);  Surgeon: Rich Syed MD;  Location: [facility] (2ND FLR);  Service: Endoscopy;  Laterality: N/A;    EXCISION, SMALL INTESTINE N/A 5/17/2024    Procedure: EXCISION, SMALL INTESTINE;  Surgeon: Ruben Oscar MD;  Location: [facility] OR 2ND FLR;  Service: General;  Laterality: N/A;    EYE SURGERY Bilateral     cataract removal and lens implants    HIP ARTHROPLASTY Left 05/28/2023    Procedure: TOTAL HIP ARTHROPLASTY;  Surgeon: Juan Wan MD;  Location: [facility] OR 2ND FLR;  Service: Orthopedics;  Laterality: Left;    LAPAROTOMY, EXPLORATORY N/A 5/17/2024    Procedure: LAPAROTOMY, EXPLORATORY;  Surgeon: Ruben Oscar MD;   [facility];  Service: General;  Laterality: N/A;    REPAIR, HERNIA, INGUINAL Bilateral 2024    Procedure: REPAIR, HERNIA, INGUINAL;  Surgeon: Ruben Oscar MD;  [facility];  Service: General;  Laterality: Bilateral;    TONSILLECTOMY       Family History       Problem Relation (Age of Onset)    Alcohol abuse Father    Aneurysm Mother    Gout Brother    Heart disease Brother    Hypertension Mother, Father    Kidney disease Brother    No Known Problems Daughter, Daughter, Daughter          Tobacco Use    Smoking status: Former     Current packs/day: 0.00     Average packs/day: 0.5 packs/day for 20.0 years (10.0 ttl pk-yrs)     Types: Cigarettes     Start date: 1979     Quit date: 1999     Years since quittin.5     Passive exposure: Past    Smokeless tobacco: Never   Substance and Sexual Activity    Alcohol use: Yes     Alcohol/week: 7.0 standard drinks of alcohol     Types: 7 Glasses of wine per week     Comment: One glass of Red wine prior to dinner    Drug use: No    Sexual activity: Yes     Partners: Male     Review of Systems   Constitutional: Negative.    HENT: Negative.     Respiratory: Negative.     Cardiovascular: Negative.    Gastrointestinal:  Positive for abdominal distention, abdominal pain, nausea and vomiting.   Endocrine: Negative.    Genitourinary: Negative.    Musculoskeletal: Negative.    Neurological: Negative.    Hematological: Negative.    Psychiatric/Behavioral: Negative.       Objective:     Vital Signs (Most Recent):  Temp: 98.2 °F (36.8 °C) (24 1102)  Pulse: 91 (24 1503)  Resp: 20 (24 1503)  BP: 130/65 (24 1503)  SpO2: 95 % (24 1503) Vital Signs (24h Range):  Temp:  [98.2 °F (36.8 °C)-98.3 °F (36.8 °C)] 98.2 °F (36.8 °C)  Pulse:  [] 91  Resp:  [16-24] 20  SpO2:  [93 %-96 %] 95 %  BP: (125-168)/(65-80) 130/65     Weight: 52.2 kg (115 lb)  Body mass index is 20.37 kg/m².     Physical Exam  Vitals and  nursing note reviewed.   Constitutional:       Appearance: She is ill-appearing.      Comments: Thin female.  Uncomfortable   HENT:      Head: Normocephalic.      Nose:      Comments: NG tube to Nare.  Not placed to suction.     Mouth/Throat:      Mouth: Mucous membranes are moist.      Pharynx: Oropharynx is clear.   Eyes:      General: No scleral icterus.     Extraocular Movements: Extraocular movements intact.      Conjunctiva/sclera: Conjunctivae normal.   Cardiovascular:      Rate and Rhythm: Normal rate.      Pulses: Normal pulses.   Pulmonary:      Effort: Pulmonary effort is normal. No respiratory distress.      Breath sounds: No wheezing.   Abdominal:      General: There is distension.      Tenderness: There is abdominal tenderness. There is no guarding or rebound.      Comments: Abdomen is moderately distended with diffuse tenderness.  No significant signs of peritonitis at this time.  Midline incision is well healed.   Genitourinary:     Comments: No evidence of inguinal hernia recurrence.  Musculoskeletal:         General: No swelling or tenderness. Normal range of motion.      Cervical back: Normal range of motion.   Skin:     General: Skin is warm and dry.      Coloration: Skin is not jaundiced or pale.   Neurological:      General: No focal deficit present.      Mental Status: She is alert and oriented to person, place, and time.   Psychiatric:         Mood and Affect: Mood normal.            I have reviewed all pertinent lab results within the past 24 hours.  CBC:   Recent Labs   Lab 07/04/24  1008   WBC 19.87*   RBC 3.32*   HGB 10.4*   HCT 32.9*      MCV 99*   MCH 31.3*   MCHC 31.6*     CMP:   Recent Labs   Lab 07/04/24  1008   *   CALCIUM 9.7   ALBUMIN 3.4*   PROT 8.0      K 3.7   CO2 22*   CL 98   BUN 20   CREATININE 1.1   ALKPHOS 91   ALT 13   AST 26   BILITOT 0.7       Significant Diagnostics:  I have reviewed all pertinent imaging results/findings within the past 24  hours.

## 2024-07-04 NOTE — PLAN OF CARE
[facility] - Emergency Dept  Initial Discharge Assessment       Primary Care Provider: Effie Olmedo MD    Admission Diagnosis: No admission diagnoses are documented for this encounter.    Admission Date: 7/4/2024  Expected Discharge Date:     Pt uses a walker for ambulation and is independent with her ADL's and does not require assistance.    Pt daughter Ramila at bedside.  Pt stated she uses [facility] and would like to continue on d/c     Pt to d/c home with home health    Transition of Care Barriers: (P) None    [facility] [employer] [facility] / Plan: [employer] CHOICES / Product Type: Medicare Advantage /     Extended Emergency Contact Information  Primary Emergency Contact: Sandie Floyd  Address: [address] [place] [address] [facility]  Home Phone: [phone]  Work Phone: [phone]  Mobile Phone: [phone]  Relation: Spouse   needed? No  Secondary Emergency Contact: Ramila Floyd  Address: [address] [place] [address] [place]  Mobile Phone: [phone]  Relation: Daughter    Discharge Plan A: (P) Home, Home Health  Discharge Plan B: (P) Home      [id number] [place], [place] - [address]  [address] [facility] [address] [facility] [address]  Phone: [phone] Fax: [phone]    [facility] - [facility] [address]  [address] [facility] [address]  Phone: [phone] Fax: [phone]      Initial Assessment (most recent)       Adult Discharge Assessment - 07/04/24 1510          Discharge Assessment    Assessment Type Discharge Planning Assessment (P)      Confirmed/corrected address, phone number and insurance Yes (P)      Confirmed Demographics Correct on Facesheet (P)      Source of Information patient (P)      People in Home spouse (P)      Facility Arrived From: home (P)      Do you expect to return to your current living situation? Yes (P)      Do  you have help at home or someone to help you manage your care at home? No (P)      Prior to hospitilization cognitive status: Alert/Oriented;No Deficits (P)      Current cognitive status: Alert/Oriented;No Deficits (P)      Walking or Climbing Stairs Difficulty yes (P)      Walking or Climbing Stairs ambulation difficulty, requires equipment (P)      Mobility Management walker (P)      Dressing/Bathing Difficulty no (P)      Home Accessibility stairs to enter home (P)      Number of Stairs, Main Entrance five (P)      Home Layout Able to live on 1st floor (P)      Equipment Currently Used at Home commode;walker, standard;oxygen (P)    O2 - as needed 1-2 L    Patient currently being followed by outpatient case management? No (P)      Do you currently have service(s) that help you manage your care at home? Yes (P)      Name and Contact number of agency [facility] (P)      Is the pt/caregiver preference to resume services with current agency Yes (P)      Do you have any problems affording any of your prescribed medications? No (P)      Is the patient taking medications as prescribed? yes (P)      Who is going to help you get home at discharge? family/friends (P)      How do you get to doctors appointments? family or friend will provide (P)      Are you on dialysis? No (P)      Do you take coumadin? No (P)      Discharge Plan A Home;Home Health (P)      Discharge Plan B Home (P)      DME Needed Upon Discharge  none (P)      Discharge Plan discussed with: Patient (P)      Transition of Care Barriers None (P)         Physical Activity    On average, how many days per week do you engage in moderate to strenuous exercise (like a brisk walk)? 3 days (P)      On average, how many minutes do you engage in exercise at this level? 60 min (P)         Financial Resource Strain    How hard is it for you to pay for the very basics like food, housing, medical care, and heating? Not very hard (P)         Housing Stability     In the last 12 months, was there a time when you were not able to pay the mortgage or rent on time? No (P)      At any time in the past 12 months, were you homeless or living in a shelter (including now)? No (P)         Transportation Needs    Has the lack of transportation kept you from medical appointments, meetings, work or from getting things needed for daily living? No (P)         Food Insecurity    Within the past 12 months, you worried that your food would run out before you got the money to buy more. Never true (P)      Within the past 12 months, the food you bought just didn't last and you didn't have money to get more. Never true (P)         Stress    Do you feel stress - tense, restless, nervous, or anxious, or unable to sleep at night because your mind is troubled all the time - these days? To some extent (P)         Social Isolation    How often do you feel lonely or isolated from those around you?  Rarely (P)         Alcohol Use    Q1: How often do you have a drink containing alcohol? Never (P)      Q2: How many drinks containing alcohol do you have on a typical day when you are drinking? Patient does not drink (P)      Q3: How often do you have six or more drinks on one occasion? Never (P)         Utilities    In the past 12 months has the electric, gas, oil, or water company threatened to shut off services in your home? No (P)         Health Literacy    How often do you need to have someone help you when you read instructions, pamphlets, or other written material from your doctor or pharmacy? Never (P)         OTHER    Name(s) of People in Home spouse Natesh (P)                      Discharge Plan A and Plan B have been determined by review of patient's clinical status, future medical and therapeutic needs, and coverage/benefits for post-acute care in coordination with multidisciplinary team members.    Pamela Worthington CD, MSW, LMSW, RSW   Case Management  [facility]  Email:  [email or web address]

## 2024-07-04 NOTE — TELEPHONE ENCOUNTER
"Pt was c/o of upset stomach and indigestion on yesterday. Overnight every hour or two she would have a wet productive spit up. She is now saying it has gone from being clear and mucus looking to a brown color. Abdomen is sore from spitting up. Care advice recommend pt go to ER/UCC. Cg verbalized understanding. .   Reason for Disposition   Age > 60 years    Additional Information   Negative: Shock suspected (e.g., cold/pale/clammy skin, too weak to stand, low BP, rapid pulse)   Negative: Difficult to awaken or acting confused (e.g., disoriented, slurred speech)   Negative: Unable to walk, or can only walk with assistance (e.g., requires support)   Negative: Fainted   Negative: [1] Vomited blood AND [2] large amount (example: "a cup of blood")   Negative: Rectal bleeding (i.e., bloody stool, blood in stool)   Negative: Sounds like a life-threatening emergency to the triager   Negative: Feeling weak or lightheaded (e.g., woozy, feeling like they might faint)   Negative: Vomited blood  (Exceptions: Few streaks that occurred only once, or swallowed blood from a nosebleed or cut in the mouth.)   Negative: [1] Vomiting AND [2] contains black ("coffee ground") material   Negative: Black or tarry bowel movements   Negative: [1] Constant abdominal pain AND [2] present > 2 hours   Negative: Recent abdominal injury (within last 3 days)   Negative: Jaundice (yellow eyes) or known cirrhosis of the liver (or history of liver failure or ascites)   Negative: Taking Coumadin (warfarin) or other strong blood thinner, or known bleeding disorder (e.g., thrombocytopenia)   Negative: Pale skin (pallor) of new-onset or getting worse   Negative: [1] Drinking very little AND [2] dehydration suspected (e.g., no urine > 12 hours, very dry mouth, very lightheaded)    Protocols used: Vomiting Blood-A-AH    "

## 2024-07-04 NOTE — Clinical Note
Diagnosis: Small bowel obstruction [930960]   Future Attending Provider: MAGY ALSTON [[id number]]   Bed request comments: MAIA

## 2024-07-04 NOTE — ED TRIAGE NOTES
Pt arrives to ED via EMS with abd pain and N/V starting yesterday after eating  bag salad. Pt denies CP or SOB. Pt reports having a GI bleed 2 months ago that required repair. Denies constipation or diarrhea. Denies dark or tarry stools. Received 8mg zofran while en route and states nausea is better.

## 2024-07-04 NOTE — ED NOTES
Nurses Note -- 4 Eyes      7/4/2024   4:29 PM      Skin assessed during: Admit      [x] No Altered Skin Integrity Present    []Prevention Measures Documented      [] Yes- Altered Skin Integrity Present or Discovered   [] LDA Added if Not in Epic (Describe Wound)   [] New Altered Skin Integrity was Present on Admit and Documented in LDA   [] Wound Image Taken    Wound Care Consulted? No    Attending Nurse:  Emily Dominguez RN/Staff Member:   Linda

## 2024-07-05 LAB
ALBUMIN SERPL BCP-MCNC: 2.6 G/DL (ref 3.5–5.2)
ALP SERPL-CCNC: 71 U/L (ref 55–135)
ALT SERPL W/O P-5'-P-CCNC: 10 U/L (ref 10–44)
ANION GAP SERPL CALC-SCNC: 11 MMOL/L (ref 8–16)
AST SERPL-CCNC: 19 U/L (ref 10–40)
BASOPHILS # BLD AUTO: 0.23 K/UL (ref 0–0.2)
BASOPHILS NFR BLD: 1.4 % (ref 0–1.9)
BILIRUB SERPL-MCNC: 0.7 MG/DL (ref 0.1–1)
BUN SERPL-MCNC: 17 MG/DL (ref 8–23)
CALCIUM SERPL-MCNC: 8.8 MG/DL (ref 8.7–10.5)
CHLORIDE SERPL-SCNC: 103 MMOL/L (ref 95–110)
CO2 SERPL-SCNC: 25 MMOL/L (ref 23–29)
CREAT SERPL-MCNC: 0.9 MG/DL (ref 0.5–1.4)
DIFFERENTIAL METHOD BLD: ABNORMAL
EOSINOPHIL # BLD AUTO: 0.1 K/UL (ref 0–0.5)
EOSINOPHIL NFR BLD: 0.3 % (ref 0–8)
ERYTHROCYTE [DISTWIDTH] IN BLOOD BY AUTOMATED COUNT: 22.7 % (ref 11.5–14.5)
EST. GFR  (NO RACE VARIABLE): >60 ML/MIN/1.73 M^2
GLUCOSE SERPL-MCNC: 103 MG/DL (ref 70–110)
HCT VFR BLD AUTO: 29.5 % (ref 37–48.5)
HGB BLD-MCNC: 9 G/DL (ref 12–16)
IMM GRANULOCYTES # BLD AUTO: 0.2 K/UL (ref 0–0.04)
IMM GRANULOCYTES NFR BLD AUTO: 1.2 % (ref 0–0.5)
LACTATE SERPL-SCNC: 0.8 MMOL/L (ref 0.5–2.2)
LYMPHOCYTES # BLD AUTO: 1.6 K/UL (ref 1–4.8)
LYMPHOCYTES NFR BLD: 10 % (ref 18–48)
MAGNESIUM SERPL-MCNC: 1.7 MG/DL (ref 1.6–2.6)
MCH RBC QN AUTO: 31.4 PG (ref 27–31)
MCHC RBC AUTO-ENTMCNC: 30.5 G/DL (ref 32–36)
MCV RBC AUTO: 103 FL (ref 82–98)
MONOCYTES # BLD AUTO: 4.9 K/UL (ref 0.3–1)
MONOCYTES NFR BLD: 30.4 % (ref 4–15)
NEUTROPHILS # BLD AUTO: 9.1 K/UL (ref 1.8–7.7)
NEUTROPHILS NFR BLD: 56.7 % (ref 38–73)
NRBC BLD-RTO: 0 /100 WBC
PHOSPHATE SERPL-MCNC: 4.3 MG/DL (ref 2.7–4.5)
PLATELET # BLD AUTO: 294 K/UL (ref 150–450)
PMV BLD AUTO: 10.5 FL (ref 9.2–12.9)
POTASSIUM SERPL-SCNC: 4 MMOL/L (ref 3.5–5.1)
PROT SERPL-MCNC: 6.1 G/DL (ref 6–8.4)
RBC # BLD AUTO: 2.87 M/UL (ref 4–5.4)
SODIUM SERPL-SCNC: 139 MMOL/L (ref 136–145)
WBC # BLD AUTO: 16.11 K/UL (ref 3.9–12.7)

## 2024-07-05 PROCEDURE — 84100 ASSAY OF PHOSPHORUS: CPT | Mod: NTX | Performed by: STUDENT IN AN ORGANIZED HEALTH CARE EDUCATION/TRAINING PROGRAM

## 2024-07-05 PROCEDURE — 80053 COMPREHEN METABOLIC PANEL: CPT | Mod: NTX | Performed by: STUDENT IN AN ORGANIZED HEALTH CARE EDUCATION/TRAINING PROGRAM

## 2024-07-05 PROCEDURE — 21400001 HC TELEMETRY ROOM: Mod: NTX

## 2024-07-05 PROCEDURE — 83605 ASSAY OF LACTIC ACID: CPT | Mod: NTX | Performed by: SURGERY

## 2024-07-05 PROCEDURE — 63600175 PHARM REV CODE 636 W HCPCS: Mod: NTX | Performed by: STUDENT IN AN ORGANIZED HEALTH CARE EDUCATION/TRAINING PROGRAM

## 2024-07-05 PROCEDURE — 99900035 HC TECH TIME PER 15 MIN (STAT): Mod: NTX

## 2024-07-05 PROCEDURE — 25000242 PHARM REV CODE 250 ALT 637 W/ HCPCS: Mod: NTX | Performed by: STUDENT IN AN ORGANIZED HEALTH CARE EDUCATION/TRAINING PROGRAM

## 2024-07-05 PROCEDURE — 27000221 HC OXYGEN, UP TO 24 HOURS: Mod: NTX

## 2024-07-05 PROCEDURE — 94640 AIRWAY INHALATION TREATMENT: CPT | Mod: NTX

## 2024-07-05 PROCEDURE — 25000003 PHARM REV CODE 250: Mod: NTX

## 2024-07-05 PROCEDURE — 85025 COMPLETE CBC W/AUTO DIFF WBC: CPT | Mod: NTX | Performed by: STUDENT IN AN ORGANIZED HEALTH CARE EDUCATION/TRAINING PROGRAM

## 2024-07-05 PROCEDURE — 36415 COLL VENOUS BLD VENIPUNCTURE: CPT | Mod: NTX | Performed by: STUDENT IN AN ORGANIZED HEALTH CARE EDUCATION/TRAINING PROGRAM

## 2024-07-05 PROCEDURE — 94761 N-INVAS EAR/PLS OXIMETRY MLT: CPT | Mod: NTX

## 2024-07-05 PROCEDURE — 83735 ASSAY OF MAGNESIUM: CPT | Mod: NTX | Performed by: STUDENT IN AN ORGANIZED HEALTH CARE EDUCATION/TRAINING PROGRAM

## 2024-07-05 RX ORDER — ACETAMINOPHEN 325 MG/1
650 TABLET ORAL EVERY 8 HOURS PRN
Status: DISCONTINUED | OUTPATIENT
Start: 2024-07-05 | End: 2024-07-05

## 2024-07-05 RX ORDER — ACETAMINOPHEN 325 MG/1
650 TABLET ORAL EVERY 8 HOURS PRN
Status: CANCELLED | OUTPATIENT
Start: 2024-07-05

## 2024-07-05 RX ORDER — AMMONIUM LACTATE 12 G/100G
LOTION TOPICAL 2 TIMES DAILY PRN
Status: DISCONTINUED | OUTPATIENT
Start: 2024-07-05 | End: 2024-07-09 | Stop reason: HOSPADM

## 2024-07-05 RX ORDER — ACETAMINOPHEN 325 MG/1
650 TABLET ORAL EVERY 8 HOURS PRN
Status: DISCONTINUED | OUTPATIENT
Start: 2024-07-05 | End: 2024-07-09 | Stop reason: HOSPADM

## 2024-07-05 RX ORDER — ALLOPURINOL 100 MG/1
200 TABLET ORAL DAILY
Status: DISCONTINUED | OUTPATIENT
Start: 2024-07-05 | End: 2024-07-09 | Stop reason: HOSPADM

## 2024-07-05 RX ORDER — HYDROXYZINE HYDROCHLORIDE 25 MG/1
25 TABLET, FILM COATED ORAL 3 TIMES DAILY PRN
Status: DISCONTINUED | OUTPATIENT
Start: 2024-07-05 | End: 2024-07-09 | Stop reason: HOSPADM

## 2024-07-05 RX ADMIN — PANTOPRAZOLE SODIUM 40 MG: 40 INJECTION, POWDER, FOR SOLUTION INTRAVENOUS at 08:07

## 2024-07-05 RX ADMIN — IPRATROPIUM BROMIDE AND ALBUTEROL SULFATE 3 ML: 2.5; .5 SOLUTION RESPIRATORY (INHALATION) at 12:07

## 2024-07-05 RX ADMIN — IPRATROPIUM BROMIDE AND ALBUTEROL SULFATE 3 ML: 2.5; .5 SOLUTION RESPIRATORY (INHALATION) at 08:07

## 2024-07-05 RX ADMIN — IPRATROPIUM BROMIDE AND ALBUTEROL SULFATE 3 ML: 2.5; .5 SOLUTION RESPIRATORY (INHALATION) at 01:07

## 2024-07-05 RX ADMIN — SODIUM CHLORIDE, POTASSIUM CHLORIDE, SODIUM LACTATE AND CALCIUM CHLORIDE: 600; 310; 30; 20 INJECTION, SOLUTION INTRAVENOUS at 02:07

## 2024-07-05 RX ADMIN — IPRATROPIUM BROMIDE AND ALBUTEROL SULFATE 3 ML: 2.5; .5 SOLUTION RESPIRATORY (INHALATION) at 09:07

## 2024-07-05 RX ADMIN — ALLOPURINOL 200 MG: 100 TABLET ORAL at 03:07

## 2024-07-05 RX ADMIN — SODIUM CHLORIDE, POTASSIUM CHLORIDE, SODIUM LACTATE AND CALCIUM CHLORIDE: 600; 310; 30; 20 INJECTION, SOLUTION INTRAVENOUS at 12:07

## 2024-07-05 NOTE — NURSING
[facility] Plan of Care Note  Dx Bowel Obstruction    [facility] Specialty bed, allopurinol started.    Goals of Care: NG tube care, promote ambulation, & safety.    Neuro: AAO X 4    Vital Signs: WNL     Respiratory: 3L NC    Diet: NPO    Is patient tolerating current diet? Yes    GTTS: LR @ 100    Urine Output/Bowel Movement: Adequate urine output by purewick, LBM 7/2/24.    Drains/Tubes/Tube Feeds (include total output/shift): Right NG tube, purewick    Lines: 20g L FA    Accuchecks: None    Skin: Skin tear, left  ankleDTI    Fall Risk Score: 15    Activity level? Assist x 1    Any scheduled procedures? None    Any safety concerns? Falls    Other: None

## 2024-07-05 NOTE — CARE UPDATE
RAPID RESPONSE NURSE PROACTIVE ROUNDING NOTE       Time of Visit:     Admit Date: 2024  LOS: 0  Code Status: Full Code   Date of Visit: 2024  : 1950  Age: 73 y.o.  Sex: female  Race: White  Bed: [address] [place] [address] A:   MRN: [id number]  Was the patient discharged from an ICU this admission? No   Was the patient discharged from a PACU within last 24 hours? No   Did the patient receive conscious sedation/general anesthesia in last 24 hours? No  Was the patient in the ED within the past 24 hours? Yes  Was the patient on NIPPV within the past 24 hours? No   Attending Physician: Rick Saunders MD  Primary Service: General Surgery,Surgery   Time spent at the bedside: 15 -30 min    SITUATION    Notified by charge RN via phone call.  Reason for alert: increased oxygen demands  Called to evaluate the patient for Respiratory    BACKGROUND     Why is the patient in the hospital?: <principal problem not specified>    Patient has a past medical history of Allergic rhinitis, Amblyopia, Carotid stenosis, Coronary atherosclerosis, Dyslipidemia, ESBL (extended spectrum beta-lactamase) producing bacteria infection, Hypertension, Nausea and vomiting, Pulmonary emphysema, SAH (subarachnoid hemorrhage), and Subarachnoid hemorrhage due to ruptured aneurysm.    Last Vitals:  Temp: 99.3 °F (37.4 °C) (2220)  Pulse: 98 (2307)  Resp: 22 ( 190)  BP: 122/51 (2220)  SpO2: 93 % (2220)    24 Hours Vitals Range:  Temp:  [98 °F (36.7 °C)-101.5 °F (38.6 °C)]   Pulse:  []   Resp:  [16-24]   BP: (116-168)/(51-80)   SpO2:  [91 %-98 %]     Labs:  Recent Labs     24  1008   WBC 19.87*   HGB 10.4*   HCT 32.9*          Recent Labs     24  1008      K 3.7   CL 98   CO2 22*   BUN 20   CREATININE 1.1   *   MG 1.8          ASSESSMENT      Physical Exam  Constitutional:       Appearance: She is ill-appearing.      Interventions: Nasal cannula in place.   Cardiovascular:      Rate  and Rhythm: Tachycardia present.   Abdominal:      General: There is distension.   Genitourinary:     Comments: rufino  Musculoskeletal:         General: Swelling present.   Skin:     General: Skin is warm.      Coloration: Skin is pale.   Neurological:      Mental Status: She is alert and oriented to person, place, and time.     VS:   HR: 108  SpO2: 92% on 5L NC  BP: 122/51   T: 99.3 oral    Called by Charge Aquilino REDDY for increased oxygen demands. Upon arrival patient lying in bed, conversing with Dr. Loera while he performs neuro assessment. Family members at bedside assist with history.    Per bedside RN, patient desaturated to mid 80's on 3L NC, oxygen up titrated to 5L NC. Current SpO2 sustaining 92-95%.     Pt AOx3, c/o pain to abdomen. Pt's NGT currently connected to wall suction, was advanced this shift by bedside staff. STAT KUB and CXR ordered. Additional labs pending.     Per Dr. Loera patient stable to remain in 1025 at this time.     Please notify Rapid Response for any concerns.     INTERVENTIONS    The patient was seen for Respiratory problem. Staff concerns included oxygen saturation < 90% despite supplemental oxygen and increased oxygen requirements. The following interventions were performed: supplemental oxygen, portable chest x-ray, continuous cardiac monitoring continued, and Lactic Acid, CMP, CBC, KUB.    RECOMMENDATIONS    -Strict I/O    -Maintain IV access and telemetry monitoring    -Follow up with x-rays, adjust NGT per MD recommendations.    -Monitor NGT output, document in flowsheet      PROVIDER ESCALATION    Yes/No  Yes    Orders received and case discussed with Dr. MICHELE Loera .    Disposition: Remain in room 1025.    FOLLOW-UP    Charge Aquilino REDDY  updated on plan of care. Instructed to call the Rapid Response Nurse, Brayan Farah RN at [phone] for additional questions or concerns.

## 2024-07-05 NOTE — CARE UPDATE
"RAPID RESPONSE NURSE PROACTIVE ROUNDING NOTE       Time of Visit: 1831    Admit Date: 2024  LOS: 0  Code Status: Full Code   Date of Visit: 2024  : 1950  Age: 73 y.o.  Sex: female  Race: White  Bed: [address] [place] [address] A:   MRN: [id number]  Was the patient discharged from an ICU this admission? No   Was the patient discharged from a PACU within last 24 hours? No   Did the patient receive conscious sedation/general anesthesia in last 24 hours? No  Was the patient in the ED within the past 24 hours? Yes  Was the patient on NIPPV within the past 24 hours? No   Attending Physician: Rick Saunders MD  Primary Service: General Surgery,Surgery   Time spent at the bedside: 30 - 45 min    SITUATION    Notified by MD.  Reason for alert: NGT  Called to evaluate the patient for  NGT placement    BACKGROUND     Why is the patient in the hospital?: <principal problem not specified>    Patient has a past medical history of Allergic rhinitis, Amblyopia, Carotid stenosis, Coronary atherosclerosis, Dyslipidemia, ESBL (extended spectrum beta-lactamase) producing bacteria infection, Hypertension, Nausea and vomiting, Pulmonary emphysema, SAH (subarachnoid hemorrhage), and Subarachnoid hemorrhage due to ruptured aneurysm.    Last Vitals:  Temp: 98.6 °F (37 °C) (1756)  Pulse: 110 (1902)  Resp: 22 (1902)  BP: 135/75 (1756)  SpO2: 94 % (1902)    24 Hours Vitals Range:  Temp:  [98 °F (36.7 °C)-98.6 °F (37 °C)]   Pulse:  []   Resp:  [16-24]   BP: (116-168)/(61-80)   SpO2:  [91 %-98 %]     Labs:  Recent Labs     24  1008   WBC 19.87*   HGB 10.4*   HCT 32.9*          Recent Labs     24  1008      K 3.7   CL 98   CO2 22*   BUN 20   CREATININE 1.1   *   MG 1.8        No results for input(s): "PH", "PCO2", "PO2", "HCO3", "POCSATURATED", "BE" in the last 72 hours.     ASSESSMENT     Called to bedside for NGT placement. Multiple attempts, NGT currently in nare, but " requiring advancement per CT/xray abdomen. Attempted NGT advancement multiple times, with tube repeatedly curling in patient's mouth. NGT removed. New NGT placed, with resistance met, gastric content aspirated. NGT placed to LIS per MD. KUB ordered for placement and  ordered for wheezing and increased WOB, SpO2 94% on 2L NC. Respiratory at bedside for breathing tx. Pt temp 101.2. Dr. Bhakta notified.     Physical Exam  HENT:      Mouth/Throat:      Mouth: Mucous membranes are moist.      Pharynx: Oropharynx is clear.   Eyes:      Pupils: Pupils are equal, round, and reactive to light.   Cardiovascular:      Rate and Rhythm: Tachycardia present.   Pulmonary:      Effort: Tachypnea present.      Comments: Expiratory wheeze.  Abdominal:      General: There is distension.   Skin:     General: Skin is warm and dry.      Capillary Refill: Capillary refill takes less than 2 seconds.   Neurological:      General: No focal deficit present.      Mental Status: She is alert and oriented to person, place, and time.      GCS: GCS eye subscore is 4. GCS verbal subscore is 5. GCS motor subscore is 6.         INTERVENTIONS    The patient was seen for Medical problem. Staff concerns included NGT placement. The following interventions were performed: continuous pulse ox monitoring , continuous pulse ox monitoring continued, and NGT placed, KUB ordered.    RECOMMENDATIONS    F/u with film. Monitor NGT output. Add  and montior SpO2. Strict I/O. Maintain tele and IV access.    PROVIDER ESCALATION    Yes/No  Yes    Orders received and case discussed with Dr. Bhakta .    Disposition: Remain in room 1025.    FOLLOW-UP    Nightshift RN updated on plan of care. Instructed to call the Rapid Response Nurse, Noel Mercer RN at [phone] for additional questions or concerns.

## 2024-07-05 NOTE — CONSULTS
[facility]  Wound Care    Patient Name:  Jessica Floyd   MRN:  [id number]  Date: 2024  Diagnosis: <principal problem not specified>    History:     Past Medical History:   Diagnosis Date    Allergic rhinitis     Amblyopia     Carotid stenosis     Coronary atherosclerosis     Outside [facility] 2014: 20% mLAD, 50% mCx, 20%, mRCA    Dyslipidemia     ESBL (extended spectrum beta-lactamase) producing bacteria infection     UTI    Hypertension     Nausea and vomiting 2017    Pulmonary emphysema 10/28/2023    SAH (subarachnoid hemorrhage) 2016, s/p clipping    Subarachnoid hemorrhage due to ruptured aneurysm        Social History     Socioeconomic History    Marital status:    Occupational History    Occupation: Retired   Tobacco Use    Smoking status: Former     Current packs/day: 0.00     Average packs/day: 0.5 packs/day for 20.0 years (10.0 ttl pk-yrs)     Types: Cigarettes     Start date: 1979     Quit date: 1999     Years since quittin.5     Passive exposure: Past    Smokeless tobacco: Never   Substance and Sexual Activity    Alcohol use: Yes     Alcohol/week: 7.0 standard drinks of alcohol     Types: 7 Glasses of wine per week     Comment: One glass of Red wine prior to dinner    Drug use: No    Sexual activity: Yes     Partners: Male   Social History Narrative    .  Has 3 grown daughters.       Social Determinants of Health     Financial Resource Strain: Low Risk  (2024)    Overall Financial Resource Strain (CARDIA)     Difficulty of Paying Living Expenses: Not very hard   Food Insecurity: No Food Insecurity (2024)    Hunger Vital Sign     Worried About Running Out of Food in the Last Year: Never true     Ran Out of Food in the Last Year: Never true   Transportation Needs: No Transportation Needs (2024)    TRANSPORTATION NEEDS     Transportation : No   Physical Activity: Sufficiently Active (2024)    Exercise Vital Sign     Days of Exercise per  Week: 3 days     Minutes of Exercise per Session: 60 min   Recent Concern: Physical Activity - Inactive (5/17/2024)    Exercise Vital Sign     Days of Exercise per Week: 0 days     Minutes of Exercise per Session: 0 min   Stress: Stress Concern Present (7/4/2024)    [employer] [place] [employer] - Occupational Stress Questionnaire     Feeling of Stress : To some extent   Housing Stability: Low Risk  (7/4/2024)    Housing Stability Vital Sign     Unable to Pay for Housing in the Last Year: No     Homeless in the Last Year: No       Precautions:     Allergies as of 07/04/2024 - Reviewed 07/04/2024   Allergen Reaction Noted    Hydromorphone Anxiety, Other (See Comments), and Hallucinations 07/04/2024       WO Assessment Details/Treatment     Patient seen for inpatient wound care consult for sacrum and left ankle. Upon assessment, sacrum is pink with blanchable area of erythema. Evidence of healing previous wound. Per patient and family, previous wound almost completely healed. Cleanse sacrum with sterile normal saline and pat dry. Apply triad BID and PRN if soiled for moisture barrier. Immerse bed ordered for pressure redistribution and microclimate. Confirmation [id number]. Skin integrity PALMER consulted.     Left ankle, leg, and toe all have old, scabbed, healed wounds. Bilateral legs with dry and flaking skin. Apply amlactin to bilateral legs for moisturizing properties. Paint left ankle and left toe wound with betadine for antispesis and drying properties. Apply EHOB boots for offloading. No other issues or concerns at this time. Will follow up 7/12/2024 or sooner if needed.        Reviewed chart for this encounter.  See flowsheet for findings.        Discussed POC with patient and primary nurse.  See EMR for orders and patient education.    Bedside nursing to continue care and monitoring.  Bedside to maintain pressure injury prevention interventions.        07/05/24 0915   WOCN Assessment   WOCN Total Time  (mins) 45   Visit Date 07/05/24   Visit Time 0915   Consult Type New   WOCN Speciality Wound   Intervention assessed;changed;applied;chart review;coordination of care;orders   Teaching on-going        Wound 07/04/24 1900 Skin Tear Left anterior;distal;lower Ankle   Date First Assessed/Time First Assessed: 07/04/24 1900   Present on Original Admission: Yes  Primary Wound Type: Skin Tear  Side: Left  Orientation: anterior;distal;lower  Location: Ankle  Is this injury device related?: No   Wound Image     Dressing Appearance Open to air;Dry;Intact;Clean   Drainage Amount None   Drainage Characteristics/Odor No odor   Appearance [place];Intact;Tan   Care Cleansed with:;Sterile normal saline   Dressing Change Due 07/05/24        Wound 07/04/24 1900 Skin Tear medial Sacral spine   Date First Assessed/Time First Assessed: 07/04/24 1900   Present on Original Admission: Yes  Primary Wound Type: Skin Tear  Orientation: medial  Location: Sacral spine   Wound Image    Dressing Appearance Open to air;Dry;Intact   Drainage Amount None   Drainage Characteristics/Odor No odor   Appearance Intact;Pink   Care Cleansed with:;Sterile normal saline;Applied:;Skin Barrier   Dressing Change Due 07/05/24             Orders placed.   Denis DOSHI, RN  07/05/2024

## 2024-07-05 NOTE — SUBJECTIVE & OBJECTIVE
Interval History: NAEON. HDS. NGT not placed to suction overnight - 200cc output recorded. Passing gas no BM. Patient's daughter states her abdomen is more distended. Patient feels well.     Medications:  Continuous Infusions:   lactated ringers   Intravenous Continuous 100 mL/hr at 07/05/24 0251 New Bag at 07/05/24 0251     Scheduled Meds:   albuterol-ipratropium  3 mL Nebulization Q6H    heparin (porcine)  5,000 Units Subcutaneous Q8H    pantoprazole  40 mg Intravenous Daily     PRN Meds:  Current Facility-Administered Medications:     acetaminophen, 650 mg, Oral, Q8H PRN    hydrALAZINE, 5 mg, Intravenous, Q6H PRN    LIDOcaine (PF) 10 mg/ml (1%), 1 mL, Intradermal, Once PRN    ondansetron, 4 mg, Intravenous, Q6H PRN    sodium chloride 0.9%, 10 mL, Intravenous, PRN     Review of patient's allergies indicates:   Allergen Reactions    Hydromorphone Anxiety, Other (See Comments) and Hallucinations     Severe agitation     Objective:     Vital Signs (Most Recent):  Temp: 99.6 °F (37.6 °C) (07/05/24 0737)  Pulse: 82 (07/05/24 0744)  Resp: 20 (07/05/24 0737)  BP: 110/60 (07/05/24 0737)  SpO2: 97 % (07/05/24 0737) Vital Signs (24h Range):  Temp:  [98 °F (36.7 °C)-101.5 °F (38.6 °C)] 99.6 °F (37.6 °C)  Pulse:  [] 82  Resp:  [16-24] 20  SpO2:  [91 %-98 %] 97 %  BP: (110-168)/(51-80) 110/60     Weight: 52.2 kg (115 lb)  Body mass index is 20.37 kg/m².    Intake/Output - Last 3 Shifts         07/03 0700 07/04 0659 07/04 0700 07/05 0659 07/05 0700 07/06 0659    I.V. (mL/kg)  1071.3 (20.5)     IV Piggyback  999     Total Intake(mL/kg)  2070.3 (39.7)     Urine (mL/kg/hr)  600     Drains  200     Total Output  800     Net  +1270.3            Urine Occurrence  1 x     Stool Occurrence  0 x 0 x             Physical Exam  Vitals and nursing note reviewed.   Constitutional:       Comments: Thin female.  Uncomfortable   HENT:      Head: Normocephalic.      Nose:      Comments: NG tube to Nare.  Not placed to suction.      Mouth/Throat:      Mouth: Mucous membranes are moist.      Pharynx: Oropharynx is clear.   Eyes:      General: No scleral icterus.     Extraocular Movements: Extraocular movements intact.      Conjunctiva/sclera: Conjunctivae normal.   Cardiovascular:      Rate and Rhythm: Normal rate.      Pulses: Normal pulses.   Pulmonary:      Effort: Pulmonary effort is normal. No respiratory distress.      Breath sounds: No wheezing.   Abdominal:      General: There is distension.      Tenderness: There is abdominal tenderness. There is no guarding or rebound.      Comments: Abdomen is moderately distended with diffuse tenderness.  No significant signs of peritonitis at this time.  Midline incision is well healed.   Genitourinary:     Comments: No evidence of inguinal hernia recurrence.  Musculoskeletal:         General: No swelling or tenderness. Normal range of motion.      Cervical back: Normal range of motion.   Skin:     General: Skin is warm and dry.      Coloration: Skin is not jaundiced or pale.   Neurological:      General: No focal deficit present.      Mental Status: She is alert and oriented to person, place, and time.   Psychiatric:         Mood and Affect: Mood normal.          Significant Labs:  I have reviewed all pertinent lab results within the past 24 hours.  CBC:   Recent Labs   Lab 07/05/24  0430   WBC 16.11*   RBC 2.87*   HGB 9.0*   HCT 29.5*      *   MCH 31.4*   MCHC 30.5*     CMP:   Recent Labs   Lab 07/05/24  0430      CALCIUM 8.8   ALBUMIN 2.6*   PROT 6.1      K 4.0   CO2 25      BUN 17   CREATININE 0.9   ALKPHOS 71   ALT 10   AST 19   BILITOT 0.7       Significant Diagnostics:  I have reviewed all pertinent imaging results/findings within the past 24 hours.

## 2024-07-05 NOTE — NURSING
Pt arrived @1750. AOX4. VSS. Pt denies any pain, nausea or other discomfort at this time. NG tube hooked up to LIWS. Plan for NG advancement on the floor. RRT team came to advance the NG tube. 4 eyes check completed during shift change. Wound care consult in place.

## 2024-07-05 NOTE — PROGRESS NOTES
[facility]  General Surgery  Progress Note    Subjective:     History of Present Illness:  Patient with complex medical history including HTN, HLD, HFwpEF, CAD, emphysema, GI bleed, and recent prolonged hospital admission for incarcerated inguinal hernia requiring exploratory laparotomy for ischemic bowel. She has been recovering quite well over the past few weeks but yesterday began to experience increasing abdominal pain and distention, nausea, and emesis. She does not note any inciting factors. Has not had any similar episodes since her last hospitalization.  On evaluation in the emergency room a CT scan of the abdomen and pelvis was performed revealing dilated small bowel loops with transition point in the mid-abdomen.  NG tube placement was attempted, but failed due to coiling.  Labs on admission revealed leukocytosis to 19, and a decreased GFR of 53.  General surgery has been consulted for evaluation.    Post-Op Info:  * No surgery found *         Interval History: NAEON. HDS. NGT not placed to suction overnight - 200cc output recorded. Passing gas no BM. Patient's daughter states her abdomen is more distended. Patient feels well.     Medications:  Continuous Infusions:   lactated ringers   Intravenous Continuous 100 mL/hr at 07/05/24 0251 New Bag at 07/05/24 0251     Scheduled Meds:   albuterol-ipratropium  3 mL Nebulization Q6H    heparin (porcine)  5,000 Units Subcutaneous Q8H    pantoprazole  40 mg Intravenous Daily     PRN Meds:  Current Facility-Administered Medications:     acetaminophen, 650 mg, Oral, Q8H PRN    hydrALAZINE, 5 mg, Intravenous, Q6H PRN    LIDOcaine (PF) 10 mg/ml (1%), 1 mL, Intradermal, Once PRN    ondansetron, 4 mg, Intravenous, Q6H PRN    sodium chloride 0.9%, 10 mL, Intravenous, PRN     Review of patient's allergies indicates:   Allergen Reactions    Hydromorphone Anxiety, Other (See Comments) and Hallucinations     Severe agitation     Objective:     Vital Signs (Most  Recent):  Temp: 99.6 °F (37.6 °C) (07/05/24 0737)  Pulse: 82 (07/05/24 0744)  Resp: 20 (07/05/24 0737)  BP: 110/60 (07/05/24 0737)  SpO2: 97 % (07/05/24 0737) Vital Signs (24h Range):  Temp:  [98 °F (36.7 °C)-101.5 °F (38.6 °C)] 99.6 °F (37.6 °C)  Pulse:  [] 82  Resp:  [16-24] 20  SpO2:  [91 %-98 %] 97 %  BP: (110-168)/(51-80) 110/60     Weight: 52.2 kg (115 lb)  Body mass index is 20.37 kg/m².    Intake/Output - Last 3 Shifts         07/03 0700  07/04 0659 07/04 0700  07/05 0659 07/05 0700  07/06 0659    I.V. (mL/kg)  1071.3 (20.5)     IV Piggyback  999     Total Intake(mL/kg)  2070.3 (39.7)     Urine (mL/kg/hr)  600     Drains  200     Total Output  800     Net  +1270.3            Urine Occurrence  1 x     Stool Occurrence  0 x 0 x             Physical Exam  Vitals and nursing note reviewed.   Constitutional:       Comments: Thin female.  Uncomfortable   HENT:      Head: Normocephalic.      Nose:      Comments: NG tube to Nare.  Not placed to suction.     Mouth/Throat:      Mouth: Mucous membranes are moist.      Pharynx: Oropharynx is clear.   Eyes:      General: No scleral icterus.     Extraocular Movements: Extraocular movements intact.      Conjunctiva/sclera: Conjunctivae normal.   Cardiovascular:      Rate and Rhythm: Normal rate.      Pulses: Normal pulses.   Pulmonary:      Effort: Pulmonary effort is normal. No respiratory distress.      Breath sounds: No wheezing.   Abdominal:      General: There is distension.      Tenderness: There is abdominal tenderness. There is no guarding or rebound.      Comments: Abdomen is moderately distended with diffuse tenderness.  No significant signs of peritonitis at this time.  Midline incision is well healed.   Genitourinary:     Comments: No evidence of inguinal hernia recurrence.  Musculoskeletal:         General: No swelling or tenderness. Normal range of motion.      Cervical back: Normal range of motion.   Skin:     General: Skin is warm and dry.       Coloration: Skin is not jaundiced or pale.   Neurological:      General: No focal deficit present.      Mental Status: She is alert and oriented to person, place, and time.   Psychiatric:         Mood and Affect: Mood normal.          Significant Labs:  I have reviewed all pertinent lab results within the past 24 hours.  CBC:   Recent Labs   Lab 07/05/24  0430   WBC 16.11*   RBC 2.87*   HGB 9.0*   HCT 29.5*      *   MCH 31.4*   MCHC 30.5*     CMP:   Recent Labs   Lab 07/05/24  0430      CALCIUM 8.8   ALBUMIN 2.6*   PROT 6.1      K 4.0   CO2 25      BUN 17   CREATININE 0.9   ALKPHOS 71   ALT 10   AST 19   BILITOT 0.7       Significant Diagnostics:  I have reviewed all pertinent imaging results/findings within the past 24 hours.  Assessment/Plan:     Bowel obstruction  Patient with complex medical history including HTN, HLD, HFwpEF, CAD, emphysema, GI bleed, and recent prolonged hospital admission for incarcerated inguinal hernia requiring exploratory laparotomy for ischemic bowel.  Now presenting with signs/symptoms of partial small bowel obstruction.  CT scan with evidence of what seems to be an adhesive band with a midabdomen.  No peritonitic signs right now.    - NPO  - NGT to LIWS  - Continue maintenance fluids  - p.r.n. hydralazine  - multimodal pain control  - home meds  - Protonix  - DVT prophylaxis with heparin        Arlene Lee MD  General Surgery  [facility]

## 2024-07-05 NOTE — NURSING
Nurses Note -- 4 Eyes      7/4/2024   07:04 PM      Skin assessed during: Admit      [] No Altered Skin Integrity Present    []Prevention Measures Documented      [x] Yes- Altered Skin Integrity Present or Discovered   [x] LDA Added if Not in Epic (Describe Wound)   [x] New Altered Skin Integrity was Present on Admit and Documented in LDA   [] Wound Image Taken    Wound Care Consulted? Yes    Attending Nurse:  Dana Dominguez RN/Staff Member:  Mandie Gonzalez

## 2024-07-05 NOTE — CLINICAL REVIEW
RAPID RESPONSE NURSE CHART REVIEW        Chart Reviewed: 07/05/2024, 8:28 AM    MRN: [id number]  Bed: 1025/1025 A    Dx: <principal problem not specified>    Jessica Floyd has a past medical history of Allergic rhinitis, Amblyopia, Carotid stenosis, Coronary atherosclerosis, Dyslipidemia, ESBL (extended spectrum beta-lactamase) producing bacteria infection, Hypertension, Nausea and vomiting, Pulmonary emphysema, SAH (subarachnoid hemorrhage), and Subarachnoid hemorrhage due to ruptured aneurysm.    Last VS: /60 (BP Location: Right arm, Patient Position: Lying)   Pulse 82   Temp 99.6 °F (37.6 °C) (Oral)   Resp 20   Wt 52.2 kg (115 lb)   SpO2 97%   Breastfeeding No   BMI 20.37 kg/m²     24H Vital Sign Range:  Temp:  [98 °F (36.7 °C)-101.5 °F (38.6 °C)]   Pulse:  []   Resp:  [16-24]   BP: (110-168)/(51-80)   SpO2:  [91 %-98 %]     Level of Consciousness (AVPU): alert    Recent Labs     07/04/24  1008 07/04/24  2245 07/05/24  0430   WBC 19.87* 16.91* 16.11*   HGB 10.4* 9.3* 9.0*   HCT 32.9* 30.7* 29.5*    304 294       Recent Labs     07/04/24  1008 07/05/24  0430    139   K 3.7 4.0   CL 98 103   CO2 22* 25   BUN 20 17   CREATININE 1.1 0.9   * 103   PHOS  --  4.3   MG 1.8 1.7        OXYGEN:  Flow (L/min) (Oxygen Therapy): 3          MEWS score: 1    Rounding completed w/Charge BARRY Gomez. Discussed AMS overnight following ativan administration and NGT placement. VSS. No additional concerns verbalized at this time. Instructed to call [phone] for further concerns or assistance.    Selina Finch RN

## 2024-07-05 NOTE — ASSESSMENT & PLAN NOTE
Patient with complex medical history including HTN, HLD, HFwpEF, CAD, emphysema, GI bleed, and recent prolonged hospital admission for incarcerated inguinal hernia requiring exploratory laparotomy for ischemic bowel.  Now presenting with signs/symptoms of partial small bowel obstruction.  CT scan with evidence of what seems to be an adhesive band with a midabdomen.  No peritonitic signs right now.    - NPO  - NGT to LIWS  - Continue maintenance fluids  - p.r.n. hydralazine  - multimodal pain control  - home meds  - Protonix  - DVT prophylaxis with heparin

## 2024-07-05 NOTE — CARE UPDATE
I personally examined the patient multiple times through the night and followed her vitals and oxygen use. I was informed over phone the patient was acting different than normal. On examine the patient was alert and orientated x4 but had recently received ativan for the NG tube she needed placed. The family also agreed she has had poor sleep for the past few days.   CBC, lactate and CXR were ordered. WBC has decreased and she has been afebrile.  Recommended rest and continued tylenol use.

## 2024-07-05 NOTE — PLAN OF CARE
[facility] Plan of Care Note    Dx:   Anxiety [F41.9]  Small bowel obstruction [K56.609]  Encounter for nasogastric (NG) tube placement [Z46.59]  Abdominal pain [R10.9]    Shift Events: Episode of SOB      Goals of Care: Progressing towards goals    Neuro: AOx4    Vital Signs: /68 (Patient Position: Lying)   Pulse 92   Temp 98.9 °F (37.2 °C) (Oral)   Resp 18   Wt 52.2 kg (115 lb)   SpO2 96%   Breastfeeding No   BMI 20.37 kg/m²     Respiratory: 3 L    Diet: Diet NPO      Is patient tolerating current diet? Yes     GTTS: n/a    Urine Output/Bowel Movement:   I/O: flowsheets  Last Bowel Movement: 06/02/24      Drains/Tubes/Tube Feeds (include total output/shift):   No intake/output data recorded.      Lines: PIV x 1      Accuchecks:N/A    Skin: Wound x 2    Fall Risk Score: 14    Activity level? Assist x 1    Any scheduled procedures? N/a    Any safety concerns? N/a    Other: n/a    Problem: Adult Inpatient Plan of Care  Goal: Plan of Care Review  Outcome: Progressing  Goal: Patient-Specific Goal (Individualized)  Outcome: Progressing  Goal: Absence of Hospital-Acquired Illness or Injury  Outcome: Progressing  Goal: Optimal Comfort and Wellbeing  Outcome: Progressing  Goal: Readiness for Transition of Care  Outcome: Progressing     Problem: Wound  Goal: Optimal Coping  Outcome: Progressing  Goal: Optimal Functional Ability  Outcome: Progressing  Goal: Absence of Infection Signs and Symptoms  Outcome: Progressing  Goal: Improved Oral Intake  Outcome: Progressing  Goal: Optimal Pain Control and Function  Outcome: Progressing  Goal: Skin Health and Integrity  Outcome: Progressing  Goal: Optimal Wound Healing  Outcome: Progressing     Problem: Skin Injury Risk Increased  Goal: Skin Health and Integrity  Outcome: Progressing

## 2024-07-06 DIAGNOSIS — Z98.890 POST-OPERATIVE STATE: ICD-10-CM

## 2024-07-06 LAB
ALBUMIN SERPL BCP-MCNC: 2.5 G/DL (ref 3.5–5.2)
ALP SERPL-CCNC: 68 U/L (ref 55–135)
ALT SERPL W/O P-5'-P-CCNC: 8 U/L (ref 10–44)
ANION GAP SERPL CALC-SCNC: 8 MMOL/L (ref 8–16)
ANISOCYTOSIS BLD QL SMEAR: SLIGHT
AST SERPL-CCNC: 16 U/L (ref 10–40)
BASOPHILS # BLD AUTO: ABNORMAL K/UL (ref 0–0.2)
BASOPHILS NFR BLD: 0 % (ref 0–1.9)
BILIRUB SERPL-MCNC: 0.9 MG/DL (ref 0.1–1)
BUN SERPL-MCNC: 12 MG/DL (ref 8–23)
CALCIUM SERPL-MCNC: 8.9 MG/DL (ref 8.7–10.5)
CHLORIDE SERPL-SCNC: 104 MMOL/L (ref 95–110)
CO2 SERPL-SCNC: 26 MMOL/L (ref 23–29)
CREAT SERPL-MCNC: 0.7 MG/DL (ref 0.5–1.4)
DIFFERENTIAL METHOD BLD: ABNORMAL
EOSINOPHIL # BLD AUTO: ABNORMAL K/UL (ref 0–0.5)
EOSINOPHIL NFR BLD: 0 % (ref 0–8)
ERYTHROCYTE [DISTWIDTH] IN BLOOD BY AUTOMATED COUNT: 22.5 % (ref 11.5–14.5)
EST. GFR  (NO RACE VARIABLE): >60 ML/MIN/1.73 M^2
GLUCOSE SERPL-MCNC: 85 MG/DL (ref 70–110)
HCT VFR BLD AUTO: 30.8 % (ref 37–48.5)
HGB BLD-MCNC: 9.2 G/DL (ref 12–16)
HYPOCHROMIA BLD QL SMEAR: ABNORMAL
IMM GRANULOCYTES # BLD AUTO: ABNORMAL K/UL (ref 0–0.04)
IMM GRANULOCYTES NFR BLD AUTO: ABNORMAL % (ref 0–0.5)
LYMPHOCYTES # BLD AUTO: ABNORMAL K/UL (ref 1–4.8)
LYMPHOCYTES NFR BLD: 9 % (ref 18–48)
MAGNESIUM SERPL-MCNC: 1.6 MG/DL (ref 1.6–2.6)
MCH RBC QN AUTO: 31 PG (ref 27–31)
MCHC RBC AUTO-ENTMCNC: 29.9 G/DL (ref 32–36)
MCV RBC AUTO: 104 FL (ref 82–98)
METAMYELOCYTES NFR BLD MANUAL: 2 %
MONOCYTES # BLD AUTO: ABNORMAL K/UL (ref 0.3–1)
MONOCYTES NFR BLD: 12 % (ref 4–15)
MYELOCYTES NFR BLD MANUAL: 2 %
NEUTROPHILS NFR BLD: 74 % (ref 38–73)
NEUTS BAND NFR BLD MANUAL: 1 %
NRBC BLD-RTO: 0 /100 WBC
OVALOCYTES BLD QL SMEAR: ABNORMAL
PHOSPHATE SERPL-MCNC: 3.5 MG/DL (ref 2.7–4.5)
PLATELET # BLD AUTO: 269 K/UL (ref 150–450)
PLATELET BLD QL SMEAR: ABNORMAL
PMV BLD AUTO: 10.1 FL (ref 9.2–12.9)
POIKILOCYTOSIS BLD QL SMEAR: SLIGHT
POLYCHROMASIA BLD QL SMEAR: ABNORMAL
POTASSIUM SERPL-SCNC: 3.4 MMOL/L (ref 3.5–5.1)
PROT SERPL-MCNC: 6 G/DL (ref 6–8.4)
RBC # BLD AUTO: 2.97 M/UL (ref 4–5.4)
SODIUM SERPL-SCNC: 138 MMOL/L (ref 136–145)
TOXIC GRANULES BLD QL SMEAR: PRESENT
WBC # BLD AUTO: 10.4 K/UL (ref 3.9–12.7)

## 2024-07-06 PROCEDURE — 25000003 PHARM REV CODE 250: Mod: NTX

## 2024-07-06 PROCEDURE — 99900035 HC TECH TIME PER 15 MIN (STAT): Mod: NTX

## 2024-07-06 PROCEDURE — 94640 AIRWAY INHALATION TREATMENT: CPT | Mod: NTX

## 2024-07-06 PROCEDURE — 83735 ASSAY OF MAGNESIUM: CPT | Mod: NTX | Performed by: STUDENT IN AN ORGANIZED HEALTH CARE EDUCATION/TRAINING PROGRAM

## 2024-07-06 PROCEDURE — 21400001 HC TELEMETRY ROOM: Mod: NTX

## 2024-07-06 PROCEDURE — 85007 BL SMEAR W/DIFF WBC COUNT: CPT | Mod: NTX | Performed by: STUDENT IN AN ORGANIZED HEALTH CARE EDUCATION/TRAINING PROGRAM

## 2024-07-06 PROCEDURE — 85027 COMPLETE CBC AUTOMATED: CPT | Mod: NTX | Performed by: STUDENT IN AN ORGANIZED HEALTH CARE EDUCATION/TRAINING PROGRAM

## 2024-07-06 PROCEDURE — 36415 COLL VENOUS BLD VENIPUNCTURE: CPT | Mod: NTX | Performed by: STUDENT IN AN ORGANIZED HEALTH CARE EDUCATION/TRAINING PROGRAM

## 2024-07-06 PROCEDURE — 27000221 HC OXYGEN, UP TO 24 HOURS: Mod: NTX

## 2024-07-06 PROCEDURE — 84100 ASSAY OF PHOSPHORUS: CPT | Mod: NTX | Performed by: STUDENT IN AN ORGANIZED HEALTH CARE EDUCATION/TRAINING PROGRAM

## 2024-07-06 PROCEDURE — 94761 N-INVAS EAR/PLS OXIMETRY MLT: CPT | Mod: NTX

## 2024-07-06 PROCEDURE — 63600175 PHARM REV CODE 636 W HCPCS: Mod: NTX | Performed by: STUDENT IN AN ORGANIZED HEALTH CARE EDUCATION/TRAINING PROGRAM

## 2024-07-06 PROCEDURE — 80053 COMPREHEN METABOLIC PANEL: CPT | Mod: NTX | Performed by: STUDENT IN AN ORGANIZED HEALTH CARE EDUCATION/TRAINING PROGRAM

## 2024-07-06 PROCEDURE — 99223 1ST HOSP IP/OBS HIGH 75: CPT | Mod: 24,NTX,, | Performed by: SURGERY

## 2024-07-06 PROCEDURE — 25000242 PHARM REV CODE 250 ALT 637 W/ HCPCS: Mod: NTX | Performed by: STUDENT IN AN ORGANIZED HEALTH CARE EDUCATION/TRAINING PROGRAM

## 2024-07-06 RX ORDER — MAGNESIUM SULFATE HEPTAHYDRATE 40 MG/ML
2 INJECTION, SOLUTION INTRAVENOUS ONCE
Status: COMPLETED | OUTPATIENT
Start: 2024-07-06 | End: 2024-07-06

## 2024-07-06 RX ORDER — LORAZEPAM 2 MG/ML
0.5 INJECTION INTRAMUSCULAR ONCE
Status: COMPLETED | OUTPATIENT
Start: 2024-07-06 | End: 2024-07-06

## 2024-07-06 RX ORDER — POTASSIUM CHLORIDE 7.45 MG/ML
10 INJECTION INTRAVENOUS
Status: COMPLETED | OUTPATIENT
Start: 2024-07-06 | End: 2024-07-06

## 2024-07-06 RX ADMIN — ACETAMINOPHEN 650 MG: 325 TABLET ORAL at 11:07

## 2024-07-06 RX ADMIN — POTASSIUM CHLORIDE 10 MEQ: 7.46 INJECTION, SOLUTION INTRAVENOUS at 08:07

## 2024-07-06 RX ADMIN — PANTOPRAZOLE SODIUM 40 MG: 40 INJECTION, POWDER, FOR SOLUTION INTRAVENOUS at 08:07

## 2024-07-06 RX ADMIN — IPRATROPIUM BROMIDE AND ALBUTEROL SULFATE 3 ML: 2.5; .5 SOLUTION RESPIRATORY (INHALATION) at 01:07

## 2024-07-06 RX ADMIN — SODIUM CHLORIDE, POTASSIUM CHLORIDE, SODIUM LACTATE AND CALCIUM CHLORIDE: 600; 310; 30; 20 INJECTION, SOLUTION INTRAVENOUS at 11:07

## 2024-07-06 RX ADMIN — POTASSIUM CHLORIDE 10 MEQ: 7.46 INJECTION, SOLUTION INTRAVENOUS at 07:07

## 2024-07-06 RX ADMIN — HEPARIN SODIUM 5000 UNITS: 5000 INJECTION INTRAVENOUS; SUBCUTANEOUS at 09:07

## 2024-07-06 RX ADMIN — IPRATROPIUM BROMIDE AND ALBUTEROL SULFATE 3 ML: 2.5; .5 SOLUTION RESPIRATORY (INHALATION) at 08:07

## 2024-07-06 RX ADMIN — ACETAMINOPHEN 650 MG: 325 TABLET ORAL at 03:07

## 2024-07-06 RX ADMIN — HEPARIN SODIUM 5000 UNITS: 5000 INJECTION INTRAVENOUS; SUBCUTANEOUS at 01:07

## 2024-07-06 RX ADMIN — ALLOPURINOL 200 MG: 100 TABLET ORAL at 08:07

## 2024-07-06 RX ADMIN — LORAZEPAM 0.5 MG: 2 INJECTION INTRAMUSCULAR; INTRAVENOUS at 06:07

## 2024-07-06 RX ADMIN — MAGNESIUM SULFATE HEPTAHYDRATE 2 G: 40 INJECTION, SOLUTION INTRAVENOUS at 05:07

## 2024-07-06 RX ADMIN — POTASSIUM CHLORIDE 10 MEQ: 7.46 INJECTION, SOLUTION INTRAVENOUS at 06:07

## 2024-07-06 RX ADMIN — ACETAMINOPHEN 650 MG: 325 TABLET ORAL at 08:07

## 2024-07-06 NOTE — TELEPHONE ENCOUNTER
No care due was identified.  [facility] Embedded Care Due Messages. Reference number: [id number].   7/06/2024 8:05:09 AM CDT

## 2024-07-06 NOTE — PROGRESS NOTES
[facility]  General Surgery  Progress Note    Subjective:     History of Present Illness:  Patient with complex medical history including HTN, HLD, HFwpEF, CAD, emphysema, GI bleed, and recent prolonged hospital admission for incarcerated inguinal hernia requiring exploratory laparotomy for ischemic bowel. She has been recovering quite well over the past few weeks but yesterday began to experience increasing abdominal pain and distention, nausea, and emesis. She does not note any inciting factors. Has not had any similar episodes since her last hospitalization.  On evaluation in the emergency room a CT scan of the abdomen and pelvis was performed revealing dilated small bowel loops with transition point in the mid-abdomen.  NG tube placement was attempted, but failed due to coiling.  Labs on admission revealed leukocytosis to 19, and a decreased GFR of 53.  General surgery has been consulted for evaluation.    Post-Op Info:  * No surgery found *         Interval History: Low NGT output overnight. NGT appeared to be clogged this am, pt complaining of nausea. NGT replaced this am with 18F, confirmed placement with KUB. After flushing, immediate return of bilious fluid, 300mL initially. Pt reports feeling better after initial decompression. Spoke with family re plan to continue decompression for the next day, and they are happy with the plan. Pt also had bladder pain and difficulty urinating, resolved after urination at bedside commode.      Medications:  Continuous Infusions:   lactated ringers   Intravenous Continuous 100 mL/hr at 07/05/24 1257 New Bag at 07/05/24 1257     Scheduled Meds:   albuterol-ipratropium  3 mL Nebulization Q6H    allopurinoL  200 mg Oral Daily    heparin (porcine)  5,000 Units Subcutaneous Q8H    magnesium sulfate IVPB  2 g Intravenous Once    pantoprazole  40 mg Intravenous Daily    potassium chloride  10 mEq Intravenous Q1H     PRN Meds:  Current Facility-Administered Medications:      acetaminophen, 650 mg, Oral, Q8H PRN    ammonium lactate, , Topical (Top), BID PRN    hydrALAZINE, 5 mg, Intravenous, Q6H PRN    hydrOXYzine HCL, 25 mg, Per NG tube, TID PRN    LIDOcaine (PF) 10 mg/ml (1%), 1 mL, Intradermal, Once PRN    ondansetron, 4 mg, Intravenous, Q6H PRN    sodium chloride 0.9%, 10 mL, Intravenous, PRN     Review of patient's allergies indicates:   Allergen Reactions    Hydromorphone Anxiety, Other (See Comments) and Hallucinations     Severe agitation     Objective:     Vital Signs (Most Recent):  Temp: 98.1 °F (36.7 °C) (07/06/24 0356)  Pulse: 73 (07/06/24 0712)  Resp: 17 (07/06/24 0356)  BP: 133/66 (07/06/24 0356)  SpO2: (!) 94 % (07/06/24 0457) Vital Signs (24h Range):  Temp:  [97.3 °F (36.3 °C)-99.7 °F (37.6 °C)] 98.1 °F (36.7 °C)  Pulse:  [73-89] 73  Resp:  [16-20] 17  SpO2:  [92 %-98 %] 94 %  BP: (107-141)/(60-75) 133/66     Weight: 52.2 kg (115 lb)  Body mass index is 20.37 kg/m².    Intake/Output - Last 3 Shifts         07/04 0700 07/05 0659 07/05 0700 07/06 0659 07/06 0700 07/07 0659    I.V. (mL/kg) 1071.3 (20.5)      IV Piggyback 999      Total Intake(mL/kg) 2070.3 (39.7)      Urine (mL/kg/hr) 600 300 (0.2)     Drains 200 150     Total Output 800 450     Net +1270.3 -450            Urine Occurrence 1 x 2 x     Stool Occurrence 0 x 0 x              Physical Exam  Constitutional:       General: She is not in acute distress.     Comments: Thin female. Appears uncomfortable but in NAD   HENT:      Head: Normocephalic.      Nose:      Comments: NGT in nare, on suction with bilious output     Mouth/Throat:      Mouth: Mucous membranes are moist.   Eyes:      General: No scleral icterus.     Extraocular Movements: Extraocular movements intact.      Conjunctiva/sclera: Conjunctivae normal.   Cardiovascular:      Rate and Rhythm: Normal rate.      Pulses: Normal pulses.   Pulmonary:      Effort: Pulmonary effort is normal. No respiratory distress.   Abdominal:      General: There is  distension.      Palpations: Abdomen is soft.      Tenderness: There is abdominal tenderness. There is no guarding.      Comments: Abdomen is mildly distended with no signs of peritonitis. Diffuse tenderness, no guarding or rebound tenderness. Midline incision is well healed.   Neurological:      General: No focal deficit present.      Mental Status: She is alert.   Psychiatric:         Mood and Affect: Mood normal.         Behavior: Behavior normal.          Significant Labs:  I have reviewed all pertinent lab results within the past 24 hours.  CBC:   Recent Labs   Lab 07/06/24  0326   WBC 10.40   RBC 2.97*   HGB 9.2*   HCT 30.8*      *   MCH 31.0   MCHC 29.9*     CMP:   Recent Labs   Lab 07/06/24  0326   GLU 85   CALCIUM 8.9   ALBUMIN 2.5*   PROT 6.0      K 3.4*   CO2 26      BUN 12   CREATININE 0.7   ALKPHOS 68   ALT 8*   AST 16   BILITOT 0.9       Significant Diagnostics:  I have reviewed all pertinent imaging results/findings within the past 24 hours.  Assessment/Plan:     Bowel obstruction  Patient with complex medical history including HTN, HLD, HFwpEF, CAD, emphysema, GI bleed, and recent prolonged hospital admission for incarcerated inguinal hernia requiring exploratory laparotomy for ischemic bowel.  Now presenting with signs/symptoms of partial small bowel obstruction.  CT scan with evidence of what seems to be an adhesive band with a midabdomen.  No peritonitic signs right now.    - NPO  - NGT to LIWS, will continue decompression and reassess tomorrow. Family on board  - q4 hr NGT flushes w/ tumi syringe  - Continue maintenance fluids  - p.r.n. hydralazine  - multimodal pain control  - home meds  - Protonix  - DVT prophylaxis with heparin        Armani Zepeda MD  General Surgery  [facility]

## 2024-07-06 NOTE — SUBJECTIVE & OBJECTIVE
Interval History: Low NGT output overnight. NGT appeared to be clogged this am, pt complaining of nausea. NGT replaced this am with 18F, confirmed placement with KUB. After flushing, immediate return of bilious fluid, 300mL initially. Pt reports feeling better after initial decompression. Spoke with family re plan to continue decompression for the next day, and they are happy with the plan. Pt also had bladder pain and difficulty urinating, resolved after urination at bedside commode.      Medications:  Continuous Infusions:   lactated ringers   Intravenous Continuous 100 mL/hr at 07/05/24 1257 New Bag at 07/05/24 1257     Scheduled Meds:   albuterol-ipratropium  3 mL Nebulization Q6H    allopurinoL  200 mg Oral Daily    heparin (porcine)  5,000 Units Subcutaneous Q8H    magnesium sulfate IVPB  2 g Intravenous Once    pantoprazole  40 mg Intravenous Daily    potassium chloride  10 mEq Intravenous Q1H     PRN Meds:  Current Facility-Administered Medications:     acetaminophen, 650 mg, Oral, Q8H PRN    ammonium lactate, , Topical (Top), BID PRN    hydrALAZINE, 5 mg, Intravenous, Q6H PRN    hydrOXYzine HCL, 25 mg, Per NG tube, TID PRN    LIDOcaine (PF) 10 mg/ml (1%), 1 mL, Intradermal, Once PRN    ondansetron, 4 mg, Intravenous, Q6H PRN    sodium chloride 0.9%, 10 mL, Intravenous, PRN     Review of patient's allergies indicates:   Allergen Reactions    Hydromorphone Anxiety, Other (See Comments) and Hallucinations     Severe agitation     Objective:     Vital Signs (Most Recent):  Temp: 98.1 °F (36.7 °C) (07/06/24 0356)  Pulse: 73 (07/06/24 0712)  Resp: 17 (07/06/24 0356)  BP: 133/66 (07/06/24 0356)  SpO2: (!) 94 % (07/06/24 0457) Vital Signs (24h Range):  Temp:  [97.3 °F (36.3 °C)-99.7 °F (37.6 °C)] 98.1 °F (36.7 °C)  Pulse:  [73-89] 73  Resp:  [16-20] 17  SpO2:  [92 %-98 %] 94 %  BP: (107-141)/(60-75) 133/66     Weight: 52.2 kg (115 lb)  Body mass index is 20.37 kg/m².    Intake/Output - Last 3 Shifts         07/04  0700  07/05 0659 07/05 0700  07/06 0659 07/06 0700  07/07 0659    I.V. (mL/kg) 1071.3 (20.5)      IV Piggyback 999      Total Intake(mL/kg) 2070.3 (39.7)      Urine (mL/kg/hr) 600 300 (0.2)     Drains 200 150     Total Output 800 450     Net +1270.3 -450            Urine Occurrence 1 x 2 x     Stool Occurrence 0 x 0 x              Physical Exam  Constitutional:       General: She is not in acute distress.     Comments: Thin female. Appears uncomfortable but in NAD   HENT:      Head: Normocephalic.      Nose:      Comments: NGT in nare, on suction with bilious output     Mouth/Throat:      Mouth: Mucous membranes are moist.   Eyes:      General: No scleral icterus.     Extraocular Movements: Extraocular movements intact.      Conjunctiva/sclera: Conjunctivae normal.   Cardiovascular:      Rate and Rhythm: Normal rate.      Pulses: Normal pulses.   Pulmonary:      Effort: Pulmonary effort is normal. No respiratory distress.   Abdominal:      General: There is distension.      Palpations: Abdomen is soft.      Tenderness: There is abdominal tenderness. There is no guarding.      Comments: Abdomen is mildly distended with no signs of peritonitis. Diffuse tenderness, no guarding or rebound tenderness. Midline incision is well healed.   Neurological:      General: No focal deficit present.      Mental Status: She is alert.   Psychiatric:         Mood and Affect: Mood normal.         Behavior: Behavior normal.          Significant Labs:  I have reviewed all pertinent lab results within the past 24 hours.  CBC:   Recent Labs   Lab 07/06/24  0326   WBC 10.40   RBC 2.97*   HGB 9.2*   HCT 30.8*      *   MCH 31.0   MCHC 29.9*     CMP:   Recent Labs   Lab 07/06/24  0326   GLU 85   CALCIUM 8.9   ALBUMIN 2.5*   PROT 6.0      K 3.4*   CO2 26      BUN 12   CREATININE 0.7   ALKPHOS 68   ALT 8*   AST 16   BILITOT 0.9       Significant Diagnostics:  I have reviewed all pertinent imaging results/findings within  the past 24 hours.

## 2024-07-06 NOTE — NURSING
[facility] Plan of Care Note  Dx Bowel Obstruction     Shift Events Pt ambulated in room with walker.    Goals of Care: NG tube care, promote ambulation, & safety.     Neuro: AAO X 4     Vital Signs: WNL              Respiratory: 3L NC     Diet: NPO     Is patient tolerating current diet? Yes     GTTS: LR @ 100     Urine Output/Bowel Movement: Adequate urine output by purewick, LBM 7/2/24.     Drains/Tubes/Tube Feeds (include total output/shift): Right NG tube, purewick     Lines: 20g L FA     Accuchecks: None     Skin: Skin tear, left  ankle DTI     Fall Risk Score: 9     Activity level? Assist x 1     Any scheduled procedures? None     Any safety concerns? Falls     Other: None

## 2024-07-06 NOTE — ASSESSMENT & PLAN NOTE
Patient with complex medical history including HTN, HLD, HFwpEF, CAD, emphysema, GI bleed, and recent prolonged hospital admission for incarcerated inguinal hernia requiring exploratory laparotomy for ischemic bowel.  Now presenting with signs/symptoms of partial small bowel obstruction.  CT scan with evidence of what seems to be an adhesive band with a midabdomen.  No peritonitic signs right now.    - NPO  - NGT to LIWS, will continue decompression and reassess tomorrow. Family on board  - q4 hr NGT flushes w/ tumi syringe  - Continue maintenance fluids  - p.r.n. hydralazine  - multimodal pain control  - home meds  - Protonix  - DVT prophylaxis with heparin

## 2024-07-07 LAB
ALBUMIN SERPL BCP-MCNC: 2.5 G/DL (ref 3.5–5.2)
ALP SERPL-CCNC: 61 U/L (ref 55–135)
ALT SERPL W/O P-5'-P-CCNC: 8 U/L (ref 10–44)
ANION GAP SERPL CALC-SCNC: 9 MMOL/L (ref 8–16)
AST SERPL-CCNC: 16 U/L (ref 10–40)
BASOPHILS # BLD AUTO: 0.12 K/UL (ref 0–0.2)
BASOPHILS NFR BLD: 1.4 % (ref 0–1.9)
BILIRUB SERPL-MCNC: 0.8 MG/DL (ref 0.1–1)
BUN SERPL-MCNC: 9 MG/DL (ref 8–23)
CALCIUM SERPL-MCNC: 8.7 MG/DL (ref 8.7–10.5)
CHLORIDE SERPL-SCNC: 103 MMOL/L (ref 95–110)
CO2 SERPL-SCNC: 25 MMOL/L (ref 23–29)
CREAT SERPL-MCNC: 0.7 MG/DL (ref 0.5–1.4)
DIFFERENTIAL METHOD BLD: ABNORMAL
EOSINOPHIL # BLD AUTO: 0.1 K/UL (ref 0–0.5)
EOSINOPHIL NFR BLD: 1.4 % (ref 0–8)
ERYTHROCYTE [DISTWIDTH] IN BLOOD BY AUTOMATED COUNT: 21.7 % (ref 11.5–14.5)
EST. GFR  (NO RACE VARIABLE): >60 ML/MIN/1.73 M^2
GLUCOSE SERPL-MCNC: 75 MG/DL (ref 70–110)
HCT VFR BLD AUTO: 32 % (ref 37–48.5)
HGB BLD-MCNC: 9.5 G/DL (ref 12–16)
IMM GRANULOCYTES # BLD AUTO: 0.1 K/UL (ref 0–0.04)
IMM GRANULOCYTES NFR BLD AUTO: 1.1 % (ref 0–0.5)
LYMPHOCYTES # BLD AUTO: 1.4 K/UL (ref 1–4.8)
LYMPHOCYTES NFR BLD: 15.9 % (ref 18–48)
MAGNESIUM SERPL-MCNC: 1.7 MG/DL (ref 1.6–2.6)
MCH RBC QN AUTO: 30.9 PG (ref 27–31)
MCHC RBC AUTO-ENTMCNC: 29.7 G/DL (ref 32–36)
MCV RBC AUTO: 104 FL (ref 82–98)
MONOCYTES # BLD AUTO: 1.5 K/UL (ref 0.3–1)
MONOCYTES NFR BLD: 17.4 % (ref 4–15)
NEUTROPHILS # BLD AUTO: 5.5 K/UL (ref 1.8–7.7)
NEUTROPHILS NFR BLD: 62.8 % (ref 38–73)
NRBC BLD-RTO: 0 /100 WBC
PHOSPHATE SERPL-MCNC: 3.4 MG/DL (ref 2.7–4.5)
PLATELET # BLD AUTO: 252 K/UL (ref 150–450)
PMV BLD AUTO: 10.1 FL (ref 9.2–12.9)
POTASSIUM SERPL-SCNC: 3.8 MMOL/L (ref 3.5–5.1)
PROT SERPL-MCNC: 5.7 G/DL (ref 6–8.4)
RBC # BLD AUTO: 3.07 M/UL (ref 4–5.4)
SODIUM SERPL-SCNC: 137 MMOL/L (ref 136–145)
WBC # BLD AUTO: 8.74 K/UL (ref 3.9–12.7)

## 2024-07-07 PROCEDURE — 85025 COMPLETE CBC W/AUTO DIFF WBC: CPT | Mod: NTX | Performed by: STUDENT IN AN ORGANIZED HEALTH CARE EDUCATION/TRAINING PROGRAM

## 2024-07-07 PROCEDURE — 80053 COMPREHEN METABOLIC PANEL: CPT | Mod: NTX | Performed by: STUDENT IN AN ORGANIZED HEALTH CARE EDUCATION/TRAINING PROGRAM

## 2024-07-07 PROCEDURE — 27000221 HC OXYGEN, UP TO 24 HOURS: Mod: NTX

## 2024-07-07 PROCEDURE — 84100 ASSAY OF PHOSPHORUS: CPT | Mod: NTX | Performed by: STUDENT IN AN ORGANIZED HEALTH CARE EDUCATION/TRAINING PROGRAM

## 2024-07-07 PROCEDURE — 25000003 PHARM REV CODE 250: Mod: NTX

## 2024-07-07 PROCEDURE — 63600175 PHARM REV CODE 636 W HCPCS: Mod: NTX | Performed by: STUDENT IN AN ORGANIZED HEALTH CARE EDUCATION/TRAINING PROGRAM

## 2024-07-07 PROCEDURE — 99900035 HC TECH TIME PER 15 MIN (STAT): Mod: NTX

## 2024-07-07 PROCEDURE — 21400001 HC TELEMETRY ROOM: Mod: NTX

## 2024-07-07 PROCEDURE — 83735 ASSAY OF MAGNESIUM: CPT | Mod: NTX | Performed by: STUDENT IN AN ORGANIZED HEALTH CARE EDUCATION/TRAINING PROGRAM

## 2024-07-07 PROCEDURE — 94640 AIRWAY INHALATION TREATMENT: CPT | Mod: NTX

## 2024-07-07 PROCEDURE — 94761 N-INVAS EAR/PLS OXIMETRY MLT: CPT | Mod: NTX

## 2024-07-07 PROCEDURE — 36415 COLL VENOUS BLD VENIPUNCTURE: CPT | Mod: NTX | Performed by: STUDENT IN AN ORGANIZED HEALTH CARE EDUCATION/TRAINING PROGRAM

## 2024-07-07 PROCEDURE — 25000242 PHARM REV CODE 250 ALT 637 W/ HCPCS: Mod: NTX | Performed by: STUDENT IN AN ORGANIZED HEALTH CARE EDUCATION/TRAINING PROGRAM

## 2024-07-07 PROCEDURE — 25500020 PHARM REV CODE 255: Mod: NTX

## 2024-07-07 RX ORDER — OXYMETAZOLINE HCL 0.05 %
2 SPRAY, NON-AEROSOL (ML) NASAL 2 TIMES DAILY
Status: DISCONTINUED | OUTPATIENT
Start: 2024-07-07 | End: 2024-07-09 | Stop reason: HOSPADM

## 2024-07-07 RX ORDER — OXYMETAZOLINE HCL 0.05 %
2 SPRAY, NON-AEROSOL (ML) NASAL 2 TIMES DAILY
Status: CANCELLED | OUTPATIENT
Start: 2024-07-07 | End: 2024-07-10

## 2024-07-07 RX ORDER — MAGNESIUM SULFATE HEPTAHYDRATE 40 MG/ML
2 INJECTION, SOLUTION INTRAVENOUS ONCE
Status: COMPLETED | OUTPATIENT
Start: 2024-07-07 | End: 2024-07-07

## 2024-07-07 RX ADMIN — IPRATROPIUM BROMIDE AND ALBUTEROL SULFATE 3 ML: 2.5; .5 SOLUTION RESPIRATORY (INHALATION) at 01:07

## 2024-07-07 RX ADMIN — IPRATROPIUM BROMIDE AND ALBUTEROL SULFATE 3 ML: 2.5; .5 SOLUTION RESPIRATORY (INHALATION) at 08:07

## 2024-07-07 RX ADMIN — IPRATROPIUM BROMIDE AND ALBUTEROL SULFATE 3 ML: 2.5; .5 SOLUTION RESPIRATORY (INHALATION) at 12:07

## 2024-07-07 RX ADMIN — HEPARIN SODIUM 5000 UNITS: 5000 INJECTION INTRAVENOUS; SUBCUTANEOUS at 01:07

## 2024-07-07 RX ADMIN — IPRATROPIUM BROMIDE AND ALBUTEROL SULFATE 3 ML: 2.5; .5 SOLUTION RESPIRATORY (INHALATION) at 07:07

## 2024-07-07 RX ADMIN — ALLOPURINOL 200 MG: 100 TABLET ORAL at 08:07

## 2024-07-07 RX ADMIN — ACETAMINOPHEN 650 MG: 325 TABLET ORAL at 09:07

## 2024-07-07 RX ADMIN — HEPARIN SODIUM 5000 UNITS: 5000 INJECTION INTRAVENOUS; SUBCUTANEOUS at 09:07

## 2024-07-07 RX ADMIN — ONDANSETRON 4 MG: 2 INJECTION INTRAMUSCULAR; INTRAVENOUS at 01:07

## 2024-07-07 RX ADMIN — HEPARIN SODIUM 5000 UNITS: 5000 INJECTION INTRAVENOUS; SUBCUTANEOUS at 05:07

## 2024-07-07 RX ADMIN — OXYMETAZOLINE HYDROCHLORIDE 2 SPRAY: 0.05 SPRAY NASAL at 11:07

## 2024-07-07 RX ADMIN — SODIUM CHLORIDE, POTASSIUM CHLORIDE, SODIUM LACTATE AND CALCIUM CHLORIDE: 600; 310; 30; 20 INJECTION, SOLUTION INTRAVENOUS at 09:07

## 2024-07-07 RX ADMIN — MAGNESIUM SULFATE HEPTAHYDRATE 2 G: 40 INJECTION, SOLUTION INTRAVENOUS at 08:07

## 2024-07-07 RX ADMIN — DIATRIZOATE MEGLUMINE AND DIATRIZOATE SODIUM 100 ML: 660; 100 LIQUID ORAL; RECTAL at 08:07

## 2024-07-07 RX ADMIN — PANTOPRAZOLE SODIUM 40 MG: 40 INJECTION, POWDER, FOR SOLUTION INTRAVENOUS at 08:07

## 2024-07-07 NOTE — SUBJECTIVE & OBJECTIVE
"Interval History:   Continued decompression overnight   17F NGT with much less clogging  Patient passing "a lot" of gas, no bowel movement yet  Feeling much less distended  No abdominal pain    Medications:  Continuous Infusions:   lactated ringers   Intravenous Continuous 100 mL/hr at 07/07/24 0903 New Bag at 07/07/24 0903     Scheduled Meds:   albuterol-ipratropium  3 mL Nebulization Q6H    allopurinoL  200 mg Oral Daily    heparin (porcine)  5,000 Units Subcutaneous Q8H    pantoprazole  40 mg Intravenous Daily     PRN Meds:  Current Facility-Administered Medications:     acetaminophen, 650 mg, Oral, Q8H PRN    ammonium lactate, , Topical (Top), BID PRN    hydrALAZINE, 5 mg, Intravenous, Q6H PRN    hydrOXYzine HCL, 25 mg, Per NG tube, TID PRN    LIDOcaine (PF) 10 mg/ml (1%), 1 mL, Intradermal, Once PRN    ondansetron, 4 mg, Intravenous, Q6H PRN    sodium chloride 0.9%, 10 mL, Intravenous, PRN     Review of patient's allergies indicates:   Allergen Reactions    Hydromorphone Anxiety, Other (See Comments) and Hallucinations     Severe agitation     Objective:     Vital Signs (Most Recent):  Temp: 97.6 °F (36.4 °C) (07/07/24 1132)  Pulse: 77 (07/07/24 1132)  Resp: 12 (07/07/24 1132)  BP: (!) 141/79 (07/07/24 1132)  SpO2: (!) 94 % (07/07/24 1132) Vital Signs (24h Range):  Temp:  [97.6 °F (36.4 °C)-98.9 °F (37.2 °C)] 97.6 °F (36.4 °C)  Pulse:  [70-99] 77  Resp:  [12-20] 12  SpO2:  [93 %-97 %] 94 %  BP: (134-161)/(70-88) 141/79     Weight: 52.2 kg (115 lb)  Body mass index is 20.37 kg/m².    Intake/Output - Last 3 Shifts         07/05 0700 07/06 0659 07/06 0700 07/07 0659 07/07 0700 07/08 0659    I.V. (mL/kg)       NG/GT  325 50    IV Piggyback       Total Intake(mL/kg)  325 (6.2) 50 (1)    Urine (mL/kg/hr) 300 (0.2) 1550 (1.2) 700 (2.8)    Drains 150 950     Stool   0    Total Output 450 3598 854    Net -981 -0235 -541           Urine Occurrence 2 x 1 x     Stool Occurrence 0 x 0 x 0 x             Physical " Exam  Constitutional:       General: She is not in acute distress.     Comments: Thin female. Appears comfortable   HENT:      Head: Normocephalic.      Nose:      Comments: NGT in nare, on suction with bilious output     Mouth/Throat:      Mouth: Mucous membranes are moist.   Eyes:      General: No scleral icterus.     Extraocular Movements: Extraocular movements intact.      Conjunctiva/sclera: Conjunctivae normal.   Cardiovascular:      Rate and Rhythm: Normal rate.      Pulses: Normal pulses.   Pulmonary:      Effort: Pulmonary effort is normal. No respiratory distress.   Abdominal:      General: There is distension.      Palpations: Abdomen is soft.      Tenderness: There is no abdominal tenderness. There is no guarding.      Comments: Abdomen is mildly distended, improved from yesterday   Neurological:      General: No focal deficit present.      Mental Status: She is alert.   Psychiatric:         Mood and Affect: Mood normal.         Behavior: Behavior normal.          Significant Labs:  I have reviewed all pertinent lab results within the past 24 hours.  CBC:   Recent Labs   Lab 07/07/24  0336   WBC 8.74   RBC 3.07*   HGB 9.5*   HCT 32.0*      *   MCH 30.9   MCHC 29.7*     CMP:   Recent Labs   Lab 07/07/24  0336   GLU 75   CALCIUM 8.7   ALBUMIN 2.5*   PROT 5.7*      K 3.8   CO2 25      BUN 9   CREATININE 0.7   ALKPHOS 61   ALT 8*   AST 16   BILITOT 0.8       Significant Diagnostics:  I have reviewed all pertinent imaging results/findings within the past 24 hours.

## 2024-07-07 NOTE — ASSESSMENT & PLAN NOTE
Patient with complex medical history including HTN, HLD, HFwpEF, CAD, emphysema, GI bleed, and recent prolonged hospital admission for incarcerated inguinal hernia requiring exploratory laparotomy for ischemic bowel.  Now presenting with signs/symptoms of partial small bowel obstruction.  CT scan with evidence of what seems to be an adhesive band with a midabdomen.  No peritonitic signs right now.    - NPO  - Gastrograffin challenge today   - NGT clamped for duration of this   - Do not place to suction unless you speak to resident   - Continue maintenance fluids  - p.r.n. hydralazine  - multimodal pain control  - home meds  - Protonix  - DVT prophylaxis with heparin

## 2024-07-07 NOTE — NURSING
[facility] Plan of Care Note    Dx Bowel Obstruction     Shift Events Gastrograffin challenge.     Goals of Care: NG tube care, promote ambulation, & safety.     Neuro: AAO X 4     Vital Signs: WNL              Respiratory: 3L NC     Diet: NPO     Is patient tolerating current diet? Yes     GTTS: LR @ 100     Urine Output/Bowel Movement: Adequate urine output by purewick, LBM 7/2/24.     Drains/Tubes/Tube Feeds (include total output/shift): Right NG tube, purewick     Lines: 20g L FA     Accuchecks: None     Skin: Skin tear, left  ankle DTI     Fall Risk Score: 9     Activity level? Assist x 1     Any scheduled procedures? None     Any safety concerns? Falls     Other: None

## 2024-07-07 NOTE — PROGRESS NOTES
"[facility]  General Surgery  Progress Note    Subjective:     History of Present Illness:  Patient with complex medical history including HTN, HLD, HFwpEF, CAD, emphysema, GI bleed, and recent prolonged hospital admission for incarcerated inguinal hernia requiring exploratory laparotomy for ischemic bowel. She has been recovering quite well over the past few weeks but yesterday began to experience increasing abdominal pain and distention, nausea, and emesis. She does not note any inciting factors. Has not had any similar episodes since her last hospitalization.  On evaluation in the emergency room a CT scan of the abdomen and pelvis was performed revealing dilated small bowel loops with transition point in the mid-abdomen.  NG tube placement was attempted, but failed due to coiling.  Labs on admission revealed leukocytosis to 19, and a decreased GFR of 53.  General surgery has been consulted for evaluation.    Post-Op Info:  * No surgery found *         Interval History:   Continued decompression overnight   17F NGT with much less clogging  Patient passing "a lot" of gas, no bowel movement yet  Feeling much less distended  No abdominal pain    Medications:  Continuous Infusions:   lactated ringers   Intravenous Continuous 100 mL/hr at 07/07/24 0903 New Bag at 07/07/24 0903     Scheduled Meds:   albuterol-ipratropium  3 mL Nebulization Q6H    allopurinoL  200 mg Oral Daily    heparin (porcine)  5,000 Units Subcutaneous Q8H    pantoprazole  40 mg Intravenous Daily     PRN Meds:  Current Facility-Administered Medications:     acetaminophen, 650 mg, Oral, Q8H PRN    ammonium lactate, , Topical (Top), BID PRN    hydrALAZINE, 5 mg, Intravenous, Q6H PRN    hydrOXYzine HCL, 25 mg, Per NG tube, TID PRN    LIDOcaine (PF) 10 mg/ml (1%), 1 mL, Intradermal, Once PRN    ondansetron, 4 mg, Intravenous, Q6H PRN    sodium chloride 0.9%, 10 mL, Intravenous, PRN     Review of patient's allergies indicates:   Allergen Reactions "    Hydromorphone Anxiety, Other (See Comments) and Hallucinations     Severe agitation     Objective:     Vital Signs (Most Recent):  Temp: 97.6 °F (36.4 °C) (07/07/24 1132)  Pulse: 77 (07/07/24 1132)  Resp: 12 (07/07/24 1132)  BP: (!) 141/79 (07/07/24 1132)  SpO2: (!) 94 % (07/07/24 1132) Vital Signs (24h Range):  Temp:  [97.6 °F (36.4 °C)-98.9 °F (37.2 °C)] 97.6 °F (36.4 °C)  Pulse:  [70-99] 77  Resp:  [12-20] 12  SpO2:  [93 %-97 %] 94 %  BP: (134-161)/(70-88) 141/79     Weight: 52.2 kg (115 lb)  Body mass index is 20.37 kg/m².    Intake/Output - Last 3 Shifts         07/05 0700 07/06 0659 07/06 0700 07/07 0659 07/07 0700 07/08 0659    I.V. (mL/kg)       NG/GT  325 50    IV Piggyback       Total Intake(mL/kg)  325 (6.2) 50 (1)    Urine (mL/kg/hr) 300 (0.2) 1550 (1.2) 700 (2.8)    Drains 150 950     Stool   0    Total Output 450 2500 700    Net -450 -2175 -650           Urine Occurrence 2 x 1 x     Stool Occurrence 0 x 0 x 0 x             Physical Exam  Constitutional:       General: She is not in acute distress.     Comments: Thin female. Appears comfortable   HENT:      Head: Normocephalic.      Nose:      Comments: NGT in nare, on suction with bilious output     Mouth/Throat:      Mouth: Mucous membranes are moist.   Eyes:      General: No scleral icterus.     Extraocular Movements: Extraocular movements intact.      Conjunctiva/sclera: Conjunctivae normal.   Cardiovascular:      Rate and Rhythm: Normal rate.      Pulses: Normal pulses.   Pulmonary:      Effort: Pulmonary effort is normal. No respiratory distress.   Abdominal:      General: There is distension.      Palpations: Abdomen is soft.      Tenderness: There is no abdominal tenderness. There is no guarding.      Comments: Abdomen is mildly distended, improved from yesterday   Neurological:      General: No focal deficit present.      Mental Status: She is alert.   Psychiatric:         Mood and Affect: Mood normal.         Behavior: Behavior normal.           Significant Labs:  I have reviewed all pertinent lab results within the past 24 hours.  CBC:   Recent Labs   Lab 07/07/24  0336   WBC 8.74   RBC 3.07*   HGB 9.5*   HCT 32.0*      *   MCH 30.9   MCHC 29.7*     CMP:   Recent Labs   Lab 07/07/24  0336   GLU 75   CALCIUM 8.7   ALBUMIN 2.5*   PROT 5.7*      K 3.8   CO2 25      BUN 9   CREATININE 0.7   ALKPHOS 61   ALT 8*   AST 16   BILITOT 0.8       Significant Diagnostics:  I have reviewed all pertinent imaging results/findings within the past 24 hours.  Assessment/Plan:     * Bowel obstruction  Patient with complex medical history including HTN, HLD, HFwpEF, CAD, emphysema, GI bleed, and recent prolonged hospital admission for incarcerated inguinal hernia requiring exploratory laparotomy for ischemic bowel.  Now presenting with signs/symptoms of partial small bowel obstruction.  CT scan with evidence of what seems to be an adhesive band with a midabdomen.  No peritonitic signs right now.    - NPO  - Gastrograffin challenge today   - NGT clamped for duration of this   - Do not place to suction unless you speak to resident   - Continue maintenance fluids  - p.r.n. hydralazine  - multimodal pain control  - home meds  - Protonix  - DVT prophylaxis with heparin        Roslyn العراقي MD  General Surgery  [facility]

## 2024-07-07 NOTE — PLAN OF CARE
[facility] Plan of Care Note    Dx:   Anxiety [F41.9]  Small bowel obstruction [K56.609]  Encounter for nasogastric (NG) tube placement [Z46.59]  Abdominal pain [R10.9]    Shift Events: n/a    Goals of Care: Progressing towards goals     Neuro: AO x 4    Vital Signs: /70 (BP Location: Right arm, Patient Position: Lying)   Pulse 70   Temp 98.9 °F (37.2 °C) (Oral)   Resp 16   Wt 52.2 kg (115 lb)   SpO2 97%   Breastfeeding No   BMI 20.37 kg/m²     Respiratory: 3L    Diet: Diet NPO      Is patient tolerating current diet? yes    GTTS: n/a    Urine Output/Bowel Movement:   I/O this shift:  In: 100 [NG/GT:100]  Out: 1000 [Urine:600; Drains:400]  Last Bowel Movement:07/02/24      Drains/Tubes/Tube Feeds (include total output/shift):   I/O this shift:  In: 100 [NG/GT:100]  Out: 1000 [Urine:600; Drains:400]      Lines: PIV x 2       Accuchecks:n/a    Skin: Toe & Ankle DTI, Sacral wound    Fall Risk Score: 9    Activity level? X 1 Assist     Any scheduled procedures? N/a    Any safety concerns? Fall Risk    Other: n/a    Problem: Adult Inpatient Plan of Care  Goal: Plan of Care Review  7/7/2024 0155 by Lisa Haynes RN  Outcome: Progressing  7/6/2024 2001 by Lisa Haynes, RN  Outcome: Progressing  Goal: Patient-Specific Goal (Individualized)  7/7/2024 0155 by Lisa Haynes RN  Outcome: Progressing  7/6/2024 2001 by Lisa Haynes, RN  Outcome: Progressing  Goal: Absence of Hospital-Acquired Illness or Injury  7/7/2024 0155 by Lisa Haynes, RN  Outcome: Progressing  7/6/2024 2001 by Lisa Haynes, RN  Outcome: Progressing  Goal: Optimal Comfort and Wellbeing  7/7/2024 0155 by Lisa Haynes, RN  Outcome: Progressing  7/6/2024 2001 by Lisa Haynes, RN  Outcome: Progressing  Goal: Readiness for Transition of Care  7/7/2024 0155 by Lisa Haynes, RN  Outcome: Progressing  7/6/2024 2001 by Lisa Haynes, RN  Outcome: Progressing     Problem: Wound  Goal: Optimal Coping  7/7/2024 0155 by Lisa Haynes, RN  Outcome: Progressing  7/6/2024 2001  by Haynes, Lisa, RN  Outcome: Progressing  Goal: Optimal Functional Ability  7/7/2024 0155 by Lisa Haynes RN  Outcome: Progressing  7/6/2024 2001 by Lisa Haynes RN  Outcome: Progressing  Goal: Absence of Infection Signs and Symptoms  7/7/2024 0155 by Lisa Haynes RN  Outcome: Progressing  7/6/2024 2001 by Lisa Haynes RN  Outcome: Progressing  Goal: Improved Oral Intake  7/7/2024 0155 by Lisa Haynes RN  Outcome: Progressing  7/6/2024 2001 by Lisa Haynes RN  Outcome: Progressing  Goal: Optimal Pain Control and Function  7/7/2024 0155 by Lisa Haynes RN  Outcome: Progressing  7/6/2024 2001 by Lisa Haynes RN  Outcome: Progressing  Goal: Skin Health and Integrity  7/7/2024 0155 by Lisa Haynes RN  Outcome: Progressing  7/6/2024 2001 by Lisa Haynes RN  Outcome: Progressing  Goal: Optimal Wound Healing  7/7/2024 0155 by Lisa Haynes RN  Outcome: Progressing  7/6/2024 2001 by Lisa Haynes RN  Outcome: Progressing     Problem: Skin Injury Risk Increased  Goal: Skin Health and Integrity  7/7/2024 0155 by Lisa Haynes RN  Outcome: Progressing  7/6/2024 2001 by Lisa Haynes RN  Outcome: Progressing

## 2024-07-08 LAB
ALBUMIN SERPL BCP-MCNC: 2.5 G/DL (ref 3.5–5.2)
ALP SERPL-CCNC: 63 U/L (ref 55–135)
ALT SERPL W/O P-5'-P-CCNC: 6 U/L (ref 10–44)
ANION GAP SERPL CALC-SCNC: 8 MMOL/L (ref 8–16)
ANISOCYTOSIS BLD QL SMEAR: SLIGHT
AST SERPL-CCNC: 16 U/L (ref 10–40)
BASOPHILS # BLD AUTO: 0.2 K/UL (ref 0–0.2)
BASOPHILS NFR BLD: 1.6 % (ref 0–1.9)
BILIRUB SERPL-MCNC: 0.7 MG/DL (ref 0.1–1)
BUN SERPL-MCNC: 8 MG/DL (ref 8–23)
CALCIUM SERPL-MCNC: 8.3 MG/DL (ref 8.7–10.5)
CHLORIDE SERPL-SCNC: 104 MMOL/L (ref 95–110)
CO2 SERPL-SCNC: 25 MMOL/L (ref 23–29)
CREAT SERPL-MCNC: 0.7 MG/DL (ref 0.5–1.4)
DIFFERENTIAL METHOD BLD: ABNORMAL
EOSINOPHIL # BLD AUTO: 0.1 K/UL (ref 0–0.5)
EOSINOPHIL NFR BLD: 0.7 % (ref 0–8)
ERYTHROCYTE [DISTWIDTH] IN BLOOD BY AUTOMATED COUNT: 22.2 % (ref 11.5–14.5)
EST. GFR  (NO RACE VARIABLE): >60 ML/MIN/1.73 M^2
GLUCOSE SERPL-MCNC: 100 MG/DL (ref 70–110)
HCT VFR BLD AUTO: 32.8 % (ref 37–48.5)
HGB BLD-MCNC: 9.6 G/DL (ref 12–16)
IMM GRANULOCYTES # BLD AUTO: 0.34 K/UL (ref 0–0.04)
IMM GRANULOCYTES NFR BLD AUTO: 2.8 % (ref 0–0.5)
LYMPHOCYTES # BLD AUTO: 1.3 K/UL (ref 1–4.8)
LYMPHOCYTES NFR BLD: 10.7 % (ref 18–48)
MAGNESIUM SERPL-MCNC: 1.6 MG/DL (ref 1.6–2.6)
MCH RBC QN AUTO: 30.7 PG (ref 27–31)
MCHC RBC AUTO-ENTMCNC: 29.3 G/DL (ref 32–36)
MCV RBC AUTO: 105 FL (ref 82–98)
MONOCYTES # BLD AUTO: 2.2 K/UL (ref 0.3–1)
MONOCYTES NFR BLD: 18.1 % (ref 4–15)
NEUTROPHILS # BLD AUTO: 8.1 K/UL (ref 1.8–7.7)
NEUTROPHILS NFR BLD: 66.1 % (ref 38–73)
NRBC BLD-RTO: 0 /100 WBC
OVALOCYTES BLD QL SMEAR: ABNORMAL
PHOSPHATE SERPL-MCNC: 3.2 MG/DL (ref 2.7–4.5)
PLATELET # BLD AUTO: 265 K/UL (ref 150–450)
PLATELET BLD QL SMEAR: ABNORMAL
PMV BLD AUTO: 10.6 FL (ref 9.2–12.9)
POIKILOCYTOSIS BLD QL SMEAR: SLIGHT
POTASSIUM SERPL-SCNC: 2.9 MMOL/L (ref 3.5–5.1)
PROT SERPL-MCNC: 6 G/DL (ref 6–8.4)
RBC # BLD AUTO: 3.13 M/UL (ref 4–5.4)
SODIUM SERPL-SCNC: 137 MMOL/L (ref 136–145)
TOXIC GRANULES BLD QL SMEAR: PRESENT
WBC # BLD AUTO: 12.22 K/UL (ref 3.9–12.7)

## 2024-07-08 PROCEDURE — 84100 ASSAY OF PHOSPHORUS: CPT | Mod: NTX | Performed by: STUDENT IN AN ORGANIZED HEALTH CARE EDUCATION/TRAINING PROGRAM

## 2024-07-08 PROCEDURE — 80053 COMPREHEN METABOLIC PANEL: CPT | Mod: NTX | Performed by: STUDENT IN AN ORGANIZED HEALTH CARE EDUCATION/TRAINING PROGRAM

## 2024-07-08 PROCEDURE — 25000003 PHARM REV CODE 250: Mod: NTX

## 2024-07-08 PROCEDURE — 63600175 PHARM REV CODE 636 W HCPCS: Mod: NTX | Performed by: STUDENT IN AN ORGANIZED HEALTH CARE EDUCATION/TRAINING PROGRAM

## 2024-07-08 PROCEDURE — 83735 ASSAY OF MAGNESIUM: CPT | Mod: NTX | Performed by: STUDENT IN AN ORGANIZED HEALTH CARE EDUCATION/TRAINING PROGRAM

## 2024-07-08 PROCEDURE — 36415 COLL VENOUS BLD VENIPUNCTURE: CPT | Mod: NTX | Performed by: STUDENT IN AN ORGANIZED HEALTH CARE EDUCATION/TRAINING PROGRAM

## 2024-07-08 PROCEDURE — 94640 AIRWAY INHALATION TREATMENT: CPT | Mod: NTX

## 2024-07-08 PROCEDURE — 94761 N-INVAS EAR/PLS OXIMETRY MLT: CPT | Mod: NTX

## 2024-07-08 PROCEDURE — 94664 DEMO&/EVAL PT USE INHALER: CPT | Mod: NTX

## 2024-07-08 PROCEDURE — 25000003 PHARM REV CODE 250: Mod: NTX | Performed by: STUDENT IN AN ORGANIZED HEALTH CARE EDUCATION/TRAINING PROGRAM

## 2024-07-08 PROCEDURE — 25000003 PHARM REV CODE 250: Mod: NTX | Performed by: SURGERY

## 2024-07-08 PROCEDURE — 27000221 HC OXYGEN, UP TO 24 HOURS: Mod: NTX

## 2024-07-08 PROCEDURE — 99900035 HC TECH TIME PER 15 MIN (STAT): Mod: NTX

## 2024-07-08 PROCEDURE — 27000646 HC AEROBIKA DEVICE: Mod: NTX

## 2024-07-08 PROCEDURE — 85025 COMPLETE CBC W/AUTO DIFF WBC: CPT | Mod: NTX | Performed by: STUDENT IN AN ORGANIZED HEALTH CARE EDUCATION/TRAINING PROGRAM

## 2024-07-08 PROCEDURE — 21400001 HC TELEMETRY ROOM: Mod: NTX

## 2024-07-08 PROCEDURE — 25000242 PHARM REV CODE 250 ALT 637 W/ HCPCS: Mod: NTX | Performed by: STUDENT IN AN ORGANIZED HEALTH CARE EDUCATION/TRAINING PROGRAM

## 2024-07-08 RX ORDER — ATORVASTATIN CALCIUM 40 MG/1
80 TABLET, FILM COATED ORAL DAILY
Status: DISCONTINUED | OUTPATIENT
Start: 2024-07-08 | End: 2024-07-09 | Stop reason: HOSPADM

## 2024-07-08 RX ORDER — CYANOCOBALAMIN (VITAMIN B-12) 250 MCG
1000 TABLET ORAL DAILY
Status: COMPLETED | OUTPATIENT
Start: 2024-07-08 | End: 2024-07-08

## 2024-07-08 RX ORDER — POTASSIUM CHLORIDE 7.45 MG/ML
10 INJECTION INTRAVENOUS
Status: DISCONTINUED | OUTPATIENT
Start: 2024-07-08 | End: 2024-07-08

## 2024-07-08 RX ORDER — CELECOXIB 200 MG/1
CAPSULE ORAL
Qty: 30 CAPSULE | Refills: 2 | OUTPATIENT
Start: 2024-07-08 | End: 2024-07-09

## 2024-07-08 RX ORDER — PANTOPRAZOLE SODIUM 40 MG/1
40 TABLET, DELAYED RELEASE ORAL 2 TIMES DAILY
Status: DISCONTINUED | OUTPATIENT
Start: 2024-07-08 | End: 2024-07-09 | Stop reason: HOSPADM

## 2024-07-08 RX ADMIN — PANTOPRAZOLE SODIUM 40 MG: 40 INJECTION, POWDER, FOR SOLUTION INTRAVENOUS at 09:07

## 2024-07-08 RX ADMIN — PANTOPRAZOLE SODIUM 40 MG: 40 TABLET, DELAYED RELEASE ORAL at 12:07

## 2024-07-08 RX ADMIN — CYANOCOBALAMIN TAB 250 MCG 1000 MCG: 250 TAB at 12:07

## 2024-07-08 RX ADMIN — HEPARIN SODIUM 5000 UNITS: 5000 INJECTION INTRAVENOUS; SUBCUTANEOUS at 03:07

## 2024-07-08 RX ADMIN — POTASSIUM BICARBONATE 25 MEQ: 978 TABLET, EFFERVESCENT ORAL at 10:07

## 2024-07-08 RX ADMIN — IPRATROPIUM BROMIDE AND ALBUTEROL SULFATE 3 ML: 2.5; .5 SOLUTION RESPIRATORY (INHALATION) at 08:07

## 2024-07-08 RX ADMIN — ACETAMINOPHEN 650 MG: 325 TABLET ORAL at 09:07

## 2024-07-08 RX ADMIN — POTASSIUM BICARBONATE 25 MEQ: 978 TABLET, EFFERVESCENT ORAL at 08:07

## 2024-07-08 RX ADMIN — ATORVASTATIN CALCIUM 80 MG: 40 TABLET, FILM COATED ORAL at 12:07

## 2024-07-08 RX ADMIN — PANTOPRAZOLE SODIUM 40 MG: 40 TABLET, DELAYED RELEASE ORAL at 08:07

## 2024-07-08 RX ADMIN — SODIUM CHLORIDE, POTASSIUM CHLORIDE, SODIUM LACTATE AND CALCIUM CHLORIDE: 600; 310; 30; 20 INJECTION, SOLUTION INTRAVENOUS at 03:07

## 2024-07-08 RX ADMIN — OXYMETAZOLINE HYDROCHLORIDE 2 SPRAY: 0.05 SPRAY NASAL at 08:07

## 2024-07-08 RX ADMIN — IPRATROPIUM BROMIDE AND ALBUTEROL SULFATE 3 ML: 2.5; .5 SOLUTION RESPIRATORY (INHALATION) at 03:07

## 2024-07-08 RX ADMIN — ACETAMINOPHEN 650 MG: 325 TABLET ORAL at 08:07

## 2024-07-08 RX ADMIN — ALLOPURINOL 200 MG: 100 TABLET ORAL at 09:07

## 2024-07-08 RX ADMIN — HEPARIN SODIUM 5000 UNITS: 5000 INJECTION INTRAVENOUS; SUBCUTANEOUS at 07:07

## 2024-07-08 RX ADMIN — IPRATROPIUM BROMIDE AND ALBUTEROL SULFATE 3 ML: 2.5; .5 SOLUTION RESPIRATORY (INHALATION) at 09:07

## 2024-07-08 NOTE — CONSULTS
LAURA consulted for PIV insertion in real time using ultrasound guidance.    Indication: PVA   Gauge and length: 20 g x 1 3/4 inch  Location: [facility]

## 2024-07-08 NOTE — SUBJECTIVE & OBJECTIVE
Interval History:   Gastrograffin challenge yesterday with 3 bowel movements  Feeling much better  Distension much improved   NGT removed this morning and starting clear liquid diet    Medications:  Continuous Infusions:   lactated ringers   Intravenous Continuous 100 mL/hr at 07/08/24 0321 New Bag at 07/08/24 0321     Scheduled Meds:   albuterol-ipratropium  3 mL Nebulization Q6H    allopurinoL  200 mg Oral Daily    heparin (porcine)  5,000 Units Subcutaneous Q8H    oxymetazoline  2 spray Each Nostril BID    pantoprazole  40 mg Intravenous Daily    potassium chloride  10 mEq Intravenous Q1H     PRN Meds:  Current Facility-Administered Medications:     acetaminophen, 650 mg, Oral, Q8H PRN    ammonium lactate, , Topical (Top), BID PRN    hydrALAZINE, 5 mg, Intravenous, Q6H PRN    hydrOXYzine HCL, 25 mg, Per NG tube, TID PRN    LIDOcaine (PF) 10 mg/ml (1%), 1 mL, Intradermal, Once PRN    ondansetron, 4 mg, Intravenous, Q6H PRN    sodium chloride 0.9%, 10 mL, Intravenous, PRN     Review of patient's allergies indicates:   Allergen Reactions    Hydromorphone Anxiety, Other (See Comments) and Hallucinations     Severe agitation     Objective:     Vital Signs (Most Recent):  Temp: 98.2 °F (36.8 °C) (07/08/24 0733)  Pulse: 84 (07/08/24 0932)  Resp: (!) 24 (07/08/24 0932)  BP: (!) 155/74 (07/08/24 0733)  SpO2: (!) 94 % (07/08/24 0932) Vital Signs (24h Range):  Temp:  [97.6 °F (36.4 °C)-99.1 °F (37.3 °C)] 98.2 °F (36.8 °C)  Pulse:  [] 84  Resp:  [12-24] 24  SpO2:  [93 %-97 %] 94 %  BP: (135-165)/(70-91) 155/74     Weight: 52.2 kg (115 lb)  Body mass index is 20.37 kg/m².    Intake/Output - Last 3 Shifts         07/06 0700  07/07 0659 07/07 0700  07/08 0659 07/08 0700  07/09 0659    NG/ 50     Total Intake(mL/kg) 325 (6.2) 50 (1)     Urine (mL/kg/hr) 1550 (1.2) 1400 (1.1)     Drains 950      Stool  0     Total Output 2500 1400     Net -2175 -1350            Urine Occurrence 1 x      Stool Occurrence 0 x 3 x               Physical Exam  Constitutional:       General: She is not in acute distress.     Comments: Thin female. Appears comfortable   HENT:      Head: Normocephalic.      Nose:      Comments: NGT in nare, on suction with bilious output     Mouth/Throat:      Mouth: Mucous membranes are moist.   Eyes:      General: No scleral icterus.     Extraocular Movements: Extraocular movements intact.      Conjunctiva/sclera: Conjunctivae normal.   Cardiovascular:      Rate and Rhythm: Normal rate.      Pulses: Normal pulses.   Pulmonary:      Effort: Pulmonary effort is normal. No respiratory distress.   Abdominal:      General: There is distension.      Palpations: Abdomen is soft.      Tenderness: There is no abdominal tenderness. There is no guarding.      Comments: Abdomen is mildly distended, improved from yesterday   Neurological:      General: No focal deficit present.      Mental Status: She is alert.   Psychiatric:         Mood and Affect: Mood normal.         Behavior: Behavior normal.          Significant Labs:  I have reviewed all pertinent lab results within the past 24 hours.  CBC:   Recent Labs   Lab 07/08/24  0531   WBC 12.22   RBC 3.13*   HGB 9.6*   HCT 32.8*      *   MCH 30.7   MCHC 29.3*     CMP:   Recent Labs   Lab 07/08/24  0531      CALCIUM 8.3*   ALBUMIN 2.5*   PROT 6.0      K 2.9*   CO2 25      BUN 8   CREATININE 0.7   ALKPHOS 63   ALT 6*   AST 16   BILITOT 0.7       Significant Diagnostics:  I have reviewed all pertinent imaging results/findings within the past 24 hours.

## 2024-07-08 NOTE — PROGRESS NOTES
[facility]  General Surgery  Progress Note    Subjective:     History of Present Illness:  Patient with complex medical history including HTN, HLD, HFwpEF, CAD, emphysema, GI bleed, and recent prolonged hospital admission for incarcerated inguinal hernia requiring exploratory laparotomy for ischemic bowel. She has been recovering quite well over the past few weeks but yesterday began to experience increasing abdominal pain and distention, nausea, and emesis. She does not note any inciting factors. Has not had any similar episodes since her last hospitalization.  On evaluation in the emergency room a CT scan of the abdomen and pelvis was performed revealing dilated small bowel loops with transition point in the mid-abdomen.  NG tube placement was attempted, but failed due to coiling.  Labs on admission revealed leukocytosis to 19, and a decreased GFR of 53.  General surgery has been consulted for evaluation.    Post-Op Info:  Procedure(s) (LRB):  LAPAROTOMY, EXPLORATORY (N/A)         Interval History:   Gastrograffin challenge yesterday with 3 bowel movements  Feeling much better  Distension much improved   NGT removed this morning and starting clear liquid diet    Medications:  Continuous Infusions:   lactated ringers   Intravenous Continuous 100 mL/hr at 07/08/24 0321 New Bag at 07/08/24 0321     Scheduled Meds:   albuterol-ipratropium  3 mL Nebulization Q6H    allopurinoL  200 mg Oral Daily    heparin (porcine)  5,000 Units Subcutaneous Q8H    oxymetazoline  2 spray Each Nostril BID    pantoprazole  40 mg Intravenous Daily    potassium chloride  10 mEq Intravenous Q1H     PRN Meds:  Current Facility-Administered Medications:     acetaminophen, 650 mg, Oral, Q8H PRN    ammonium lactate, , Topical (Top), BID PRN    hydrALAZINE, 5 mg, Intravenous, Q6H PRN    hydrOXYzine HCL, 25 mg, Per NG tube, TID PRN    LIDOcaine (PF) 10 mg/ml (1%), 1 mL, Intradermal, Once PRN    ondansetron, 4 mg, Intravenous, Q6H PRN     sodium chloride 0.9%, 10 mL, Intravenous, PRN     Review of patient's allergies indicates:   Allergen Reactions    Hydromorphone Anxiety, Other (See Comments) and Hallucinations     Severe agitation     Objective:     Vital Signs (Most Recent):  Temp: 98.2 °F (36.8 °C) (07/08/24 0733)  Pulse: 84 (07/08/24 0932)  Resp: (!) 24 (07/08/24 0932)  BP: (!) 155/74 (07/08/24 0733)  SpO2: (!) 94 % (07/08/24 0932) Vital Signs (24h Range):  Temp:  [97.6 °F (36.4 °C)-99.1 °F (37.3 °C)] 98.2 °F (36.8 °C)  Pulse:  [] 84  Resp:  [12-24] 24  SpO2:  [93 %-97 %] 94 %  BP: (135-165)/(70-91) 155/74     Weight: 52.2 kg (115 lb)  Body mass index is 20.37 kg/m².    Intake/Output - Last 3 Shifts         07/06 0700  07/07 0659 07/07 0700  07/08 0659 07/08 0700  07/09 0659    NG/ 50     Total Intake(mL/kg) 325 (6.2) 50 (1)     Urine (mL/kg/hr) 1550 (1.2) 1400 (1.1)     Drains 950      Stool  0     Total Output 2500 1400     Net -2175 -1350            Urine Occurrence 1 x      Stool Occurrence 0 x 3 x              Physical Exam  Constitutional:       General: She is not in acute distress.     Comments: Thin female. Appears comfortable   HENT:      Head: Normocephalic.      Nose:      Comments: NGT in nare, on suction with bilious output     Mouth/Throat:      Mouth: Mucous membranes are moist.   Eyes:      General: No scleral icterus.     Extraocular Movements: Extraocular movements intact.      Conjunctiva/sclera: Conjunctivae normal.   Cardiovascular:      Rate and Rhythm: Normal rate.      Pulses: Normal pulses.   Pulmonary:      Effort: Pulmonary effort is normal. No respiratory distress.   Abdominal:      General: There is distension.      Palpations: Abdomen is soft.      Tenderness: There is no abdominal tenderness. There is no guarding.      Comments: Abdomen is mildly distended, improved from yesterday   Neurological:      General: No focal deficit present.      Mental Status: She is alert.   Psychiatric:         Mood and  Affect: Mood normal.         Behavior: Behavior normal.          Significant Labs:  I have reviewed all pertinent lab results within the past 24 hours.  CBC:   Recent Labs   Lab 07/08/24  0531   WBC 12.22   RBC 3.13*   HGB 9.6*   HCT 32.8*      *   MCH 30.7   MCHC 29.3*     CMP:   Recent Labs   Lab 07/08/24  0531      CALCIUM 8.3*   ALBUMIN 2.5*   PROT 6.0      K 2.9*   CO2 25      BUN 8   CREATININE 0.7   ALKPHOS 63   ALT 6*   AST 16   BILITOT 0.7       Significant Diagnostics:  I have reviewed all pertinent imaging results/findings within the past 24 hours.  Assessment/Plan:     * Bowel obstruction  Patient with complex medical history including HTN, HLD, HFwpEF, CAD, emphysema, GI bleed, and recent prolonged hospital admission for incarcerated inguinal hernia requiring exploratory laparotomy for ischemic bowel.  Now presenting with signs/symptoms of partial small bowel obstruction.  CT scan with evidence of what seems to be an adhesive band with a midabdomen.  No peritonitic signs right now.    - D/c NGT  - Clear liquid diet  - d/c IVF  - p.r.n. hydralazine  - multimodal pain control  - home meds  - Ambulate  - Protonix  - DVT prophylaxis with heparin        Roslyn العراقي MD  General Surgery  [facility]

## 2024-07-08 NOTE — PLAN OF CARE
Problem: Adult Inpatient Plan of Care  Goal: Plan of Care Review  Outcome: Progressing  Goal: Patient-Specific Goal (Individualized)  Outcome: Progressing  Goal: Absence of Hospital-Acquired Illness or Injury  Outcome: Progressing  Goal: Optimal Comfort and Wellbeing  Outcome: Progressing  Goal: Readiness for Transition of Care  Outcome: Progressing     Problem: Wound  Goal: Optimal Coping  Outcome: Progressing  Goal: Optimal Functional Ability  Outcome: Progressing  Goal: Absence of Infection Signs and Symptoms  Outcome: Progressing  Goal: Improved Oral Intake  Outcome: Progressing  Goal: Optimal Pain Control and Function  Outcome: Progressing  Goal: Skin Health and Integrity  Outcome: Progressing  Goal: Optimal Wound Healing  Outcome: Progressing     Problem: Skin Injury Risk Increased  Goal: Skin Health and Integrity  Outcome: Progressing       [facility] Plan of Care Note    Dx:   Anxiety [F41.9]  Small bowel obstruction [K56.609]  Encounter for nasogastric (NG) tube placement [Z46.59]  Abdominal pain [R10.9]    Shift Events: Epistaxis     Goals of Care: Gastrografin success    Neuro: WDL    Vital Signs: BP (!) 165/79 (BP Location: Left arm, Patient Position: Lying)   Pulse 82   Temp 97.8 °F (36.6 °C) (Oral)   Resp 18   Wt 52.2 kg (115 lb)   SpO2 (!) 93%   Breastfeeding No   BMI 20.37 kg/m²     Respiratory: WDL    Diet: Diet NPO      Is patient tolerating current diet? NA    GTTS: IVF    Urine Output/Bowel Movement:   I/O this shift:  In: -   Out: 400 [Urine:400]  Last Bowel Movement: 07/07/24      Drains/Tubes/Tube Feeds (include total output/shift):   I/O this shift:  In: -   Out: 400 [Urine:400]      Lines: PIV x1      Accuchecks:NA    Skin: WDL ex wounds/ drains    Fall Risk Score: see flow sheets    Activity level? Up with assistance    Any scheduled procedures? Possible sx 7/8    Any safety concerns? NA    Other: NA

## 2024-07-08 NOTE — TELEPHONE ENCOUNTER
Refill Encounter    PCP Visits: Recent Visits  Date Type Provider Dept   11/27/23 Office Visit Effie Olmedo MD [facility]   Showing recent visits within past 360 days and meeting all other requirements  Future Appointments  Date Type Provider Dept   08/13/24 Appointment Effie Olmedo MD [facility]   Showing future appointments within next 720 days and meeting all other requirements     Last 3 Blood Pressure:   BP Readings from Last 3 Encounters:   07/08/24 (!) 165/79   06/25/24 122/69   06/19/24 112/62     Preferred Pharmacy:   [facility]/pharmacy #[id number] - [facility] [address]  [address] [address]  Phone: [phone] Fax: [phone]    Requested RX:  Requested Prescriptions     Pending Prescriptions Disp Refills    celecoxib (CELEBREX) 200 MG capsule [Pharmacy Med Name: CELECOXIB 200 MG CAPSULE] 30 capsule 2     Sig: TAKE 1 CAPSULE BY MOUTH DAILY AS NEEDED FOR PAIN.      RX Route: Normal

## 2024-07-09 VITALS
TEMPERATURE: 100 F | HEART RATE: 93 BPM | OXYGEN SATURATION: 95 % | DIASTOLIC BLOOD PRESSURE: 66 MMHG | WEIGHT: 109.56 LBS | BODY MASS INDEX: 19.41 KG/M2 | RESPIRATION RATE: 20 BRPM | SYSTOLIC BLOOD PRESSURE: 114 MMHG

## 2024-07-09 PROBLEM — K56.609 BOWEL OBSTRUCTION: Status: RESOLVED | Noted: 2024-05-17 | Resolved: 2024-07-09

## 2024-07-09 PROBLEM — L98.429: Status: ACTIVE | Noted: 2024-07-09

## 2024-07-09 LAB
ALBUMIN SERPL BCP-MCNC: 2.3 G/DL (ref 3.5–5.2)
ALP SERPL-CCNC: 51 U/L (ref 55–135)
ALT SERPL W/O P-5'-P-CCNC: 5 U/L (ref 10–44)
ANION GAP SERPL CALC-SCNC: 7 MMOL/L (ref 8–16)
AST SERPL-CCNC: 15 U/L (ref 10–40)
BASOPHILS # BLD AUTO: 0.18 K/UL (ref 0–0.2)
BASOPHILS NFR BLD: 2.3 % (ref 0–1.9)
BILIRUB SERPL-MCNC: 0.6 MG/DL (ref 0.1–1)
BUN SERPL-MCNC: 7 MG/DL (ref 8–23)
CALCIUM SERPL-MCNC: 8 MG/DL (ref 8.7–10.5)
CHLORIDE SERPL-SCNC: 104 MMOL/L (ref 95–110)
CO2 SERPL-SCNC: 27 MMOL/L (ref 23–29)
CREAT SERPL-MCNC: 0.6 MG/DL (ref 0.5–1.4)
DIFFERENTIAL METHOD BLD: ABNORMAL
EOSINOPHIL # BLD AUTO: 0.1 K/UL (ref 0–0.5)
EOSINOPHIL NFR BLD: 1.3 % (ref 0–8)
ERYTHROCYTE [DISTWIDTH] IN BLOOD BY AUTOMATED COUNT: 21.8 % (ref 11.5–14.5)
EST. GFR  (NO RACE VARIABLE): >60 ML/MIN/1.73 M^2
GLUCOSE SERPL-MCNC: 89 MG/DL (ref 70–110)
HCT VFR BLD AUTO: 27.1 % (ref 37–48.5)
HGB BLD-MCNC: 8.4 G/DL (ref 12–16)
IMM GRANULOCYTES # BLD AUTO: 0.28 K/UL (ref 0–0.04)
IMM GRANULOCYTES NFR BLD AUTO: 3.7 % (ref 0–0.5)
LYMPHOCYTES # BLD AUTO: 1.2 K/UL (ref 1–4.8)
LYMPHOCYTES NFR BLD: 15.1 % (ref 18–48)
MAGNESIUM SERPL-MCNC: 1.5 MG/DL (ref 1.6–2.6)
MCH RBC QN AUTO: 30.9 PG (ref 27–31)
MCHC RBC AUTO-ENTMCNC: 31 G/DL (ref 32–36)
MCV RBC AUTO: 100 FL (ref 82–98)
MONOCYTES # BLD AUTO: 1.4 K/UL (ref 0.3–1)
MONOCYTES NFR BLD: 18.3 % (ref 4–15)
NEUTROPHILS # BLD AUTO: 4.5 K/UL (ref 1.8–7.7)
NEUTROPHILS NFR BLD: 59.3 % (ref 38–73)
NRBC BLD-RTO: 0 /100 WBC
PHOSPHATE SERPL-MCNC: 2.6 MG/DL (ref 2.7–4.5)
PLATELET # BLD AUTO: 202 K/UL (ref 150–450)
PMV BLD AUTO: 10 FL (ref 9.2–12.9)
POTASSIUM SERPL-SCNC: 3.4 MMOL/L (ref 3.5–5.1)
PROT SERPL-MCNC: 5.4 G/DL (ref 6–8.4)
RBC # BLD AUTO: 2.72 M/UL (ref 4–5.4)
SODIUM SERPL-SCNC: 138 MMOL/L (ref 136–145)
WBC # BLD AUTO: 7.66 K/UL (ref 3.9–12.7)

## 2024-07-09 PROCEDURE — 27000221 HC OXYGEN, UP TO 24 HOURS: Mod: NTX

## 2024-07-09 PROCEDURE — 94761 N-INVAS EAR/PLS OXIMETRY MLT: CPT | Mod: NTX

## 2024-07-09 PROCEDURE — 94640 AIRWAY INHALATION TREATMENT: CPT | Mod: NTX

## 2024-07-09 PROCEDURE — 83735 ASSAY OF MAGNESIUM: CPT | Mod: NTX | Performed by: STUDENT IN AN ORGANIZED HEALTH CARE EDUCATION/TRAINING PROGRAM

## 2024-07-09 PROCEDURE — 36415 COLL VENOUS BLD VENIPUNCTURE: CPT | Mod: NTX | Performed by: STUDENT IN AN ORGANIZED HEALTH CARE EDUCATION/TRAINING PROGRAM

## 2024-07-09 PROCEDURE — 25000003 PHARM REV CODE 250: Mod: NTX | Performed by: STUDENT IN AN ORGANIZED HEALTH CARE EDUCATION/TRAINING PROGRAM

## 2024-07-09 PROCEDURE — 25000003 PHARM REV CODE 250: Mod: NTX

## 2024-07-09 PROCEDURE — 84100 ASSAY OF PHOSPHORUS: CPT | Mod: NTX | Performed by: STUDENT IN AN ORGANIZED HEALTH CARE EDUCATION/TRAINING PROGRAM

## 2024-07-09 PROCEDURE — 99024 POSTOP FOLLOW-UP VISIT: CPT | Mod: NTX,,, | Performed by: STUDENT IN AN ORGANIZED HEALTH CARE EDUCATION/TRAINING PROGRAM

## 2024-07-09 PROCEDURE — 99222 1ST HOSP IP/OBS MODERATE 55: CPT | Mod: NTX,,, | Performed by: NURSE PRACTITIONER

## 2024-07-09 PROCEDURE — 80053 COMPREHEN METABOLIC PANEL: CPT | Mod: NTX | Performed by: STUDENT IN AN ORGANIZED HEALTH CARE EDUCATION/TRAINING PROGRAM

## 2024-07-09 PROCEDURE — 85025 COMPLETE CBC W/AUTO DIFF WBC: CPT | Mod: NTX | Performed by: STUDENT IN AN ORGANIZED HEALTH CARE EDUCATION/TRAINING PROGRAM

## 2024-07-09 PROCEDURE — 99900035 HC TECH TIME PER 15 MIN (STAT): Mod: NTX

## 2024-07-09 PROCEDURE — 25000242 PHARM REV CODE 250 ALT 637 W/ HCPCS: Mod: NTX | Performed by: STUDENT IN AN ORGANIZED HEALTH CARE EDUCATION/TRAINING PROGRAM

## 2024-07-09 PROCEDURE — 63600175 PHARM REV CODE 636 W HCPCS: Mod: NTX | Performed by: STUDENT IN AN ORGANIZED HEALTH CARE EDUCATION/TRAINING PROGRAM

## 2024-07-09 RX ORDER — LIDOCAINE AND PRILOCAINE 25; 25 MG/G; MG/G
CREAM TOPICAL
Qty: 30 G | Refills: 2
Start: 2024-07-09 | End: 2024-08-08

## 2024-07-09 RX ORDER — HYDROCHLOROTHIAZIDE 12.5 MG/1
12.5 CAPSULE ORAL DAILY
Qty: 90 CAPSULE | Refills: 2
Start: 2024-07-09 | End: 2024-07-16 | Stop reason: SDUPTHER

## 2024-07-09 RX ORDER — POLYETHYLENE GLYCOL 3350 17 G/17G
17 POWDER, FOR SOLUTION ORAL DAILY PRN
COMMUNITY
Start: 2024-07-09

## 2024-07-09 RX ORDER — ATORVASTATIN CALCIUM 80 MG/1
80 TABLET, FILM COATED ORAL DAILY
Qty: 90 TABLET | Refills: 3
Start: 2024-07-09 | End: 2025-07-09

## 2024-07-09 RX ORDER — COLCHICINE 0.6 MG/1
0.6 TABLET ORAL DAILY
Qty: 30 TABLET | Refills: 8
Start: 2024-07-09 | End: 2025-04-05

## 2024-07-09 RX ORDER — FLUTICASONE PROPIONATE AND SALMETEROL XINAFOATE 115; 21 UG/1; UG/1
2 AEROSOL, METERED RESPIRATORY (INHALATION) EVERY 12 HOURS
Start: 2024-07-09 | End: 2025-07-09

## 2024-07-09 RX ORDER — ALLOPURINOL 100 MG/1
200 TABLET ORAL DAILY
Start: 2024-07-09 | End: 2024-10-07

## 2024-07-09 RX ORDER — PANTOPRAZOLE SODIUM 40 MG/1
40 TABLET, DELAYED RELEASE ORAL 2 TIMES DAILY
Start: 2024-07-09 | End: 2025-07-09

## 2024-07-09 RX ORDER — MONTELUKAST SODIUM 10 MG/1
10 TABLET ORAL NIGHTLY
Qty: 30 TABLET | Refills: 6
Start: 2024-07-09 | End: 2025-02-04

## 2024-07-09 RX ORDER — TALC
6 POWDER (GRAM) TOPICAL NIGHTLY PRN
Start: 2024-07-09 | End: 2024-08-08

## 2024-07-09 RX ORDER — LANOLIN ALCOHOL/MO/W.PET/CERES
400 CREAM (GRAM) TOPICAL 2 TIMES DAILY
Status: DISCONTINUED | OUTPATIENT
Start: 2024-07-09 | End: 2024-07-09 | Stop reason: HOSPADM

## 2024-07-09 RX ORDER — SODIUM,POTASSIUM PHOSPHATES 280-250MG
1 POWDER IN PACKET (EA) ORAL
Status: DISCONTINUED | OUTPATIENT
Start: 2024-07-09 | End: 2024-07-09 | Stop reason: HOSPADM

## 2024-07-09 RX ORDER — AMLODIPINE BESYLATE 5 MG/1
5 TABLET ORAL DAILY
Start: 2024-07-09 | End: 2025-07-09

## 2024-07-09 RX ORDER — POTASSIUM CHLORIDE 20 MEQ/1
40 TABLET, EXTENDED RELEASE ORAL ONCE
Status: COMPLETED | OUTPATIENT
Start: 2024-07-09 | End: 2024-07-09

## 2024-07-09 RX ORDER — QUETIAPINE FUMARATE 50 MG/1
50 TABLET, FILM COATED ORAL NIGHTLY
Qty: 30 TABLET | Refills: 5 | Status: SHIPPED | OUTPATIENT
Start: 2024-07-09 | End: 2025-01-05

## 2024-07-09 RX ORDER — FLUTICASONE PROPIONATE 50 MCG
2 SPRAY, SUSPENSION (ML) NASAL DAILY
Start: 2024-07-09

## 2024-07-09 RX ORDER — FUROSEMIDE 20 MG/1
20 TABLET ORAL DAILY
Start: 2024-07-09 | End: 2024-10-07

## 2024-07-09 RX ORDER — FLUTICASONE FUROATE AND VILANTEROL 100; 25 UG/1; UG/1
1 POWDER RESPIRATORY (INHALATION) DAILY
Start: 2024-07-09 | End: 2025-07-09

## 2024-07-09 RX ORDER — CELECOXIB 200 MG/1
200 CAPSULE ORAL DAILY PRN
Start: 2024-07-09 | End: 2024-08-08

## 2024-07-09 RX ORDER — ACETAMINOPHEN 500 MG
1000 TABLET ORAL EVERY 8 HOURS PRN
COMMUNITY
Start: 2024-07-09 | End: 2024-07-19

## 2024-07-09 RX ADMIN — ATORVASTATIN CALCIUM 80 MG: 40 TABLET, FILM COATED ORAL at 09:07

## 2024-07-09 RX ADMIN — POTASSIUM & SODIUM PHOSPHATES POWDER PACK 280-160-250 MG 1 PACKET: 280-160-250 PACK at 05:07

## 2024-07-09 RX ADMIN — ALLOPURINOL 200 MG: 100 TABLET ORAL at 09:07

## 2024-07-09 RX ADMIN — Medication 400 MG: at 09:07

## 2024-07-09 RX ADMIN — POTASSIUM CHLORIDE 40 MEQ: 1500 TABLET, EXTENDED RELEASE ORAL at 09:07

## 2024-07-09 RX ADMIN — HEPARIN SODIUM 5000 UNITS: 5000 INJECTION INTRAVENOUS; SUBCUTANEOUS at 05:07

## 2024-07-09 RX ADMIN — IPRATROPIUM BROMIDE AND ALBUTEROL SULFATE 3 ML: 2.5; .5 SOLUTION RESPIRATORY (INHALATION) at 08:07

## 2024-07-09 RX ADMIN — IPRATROPIUM BROMIDE AND ALBUTEROL SULFATE 3 ML: 2.5; .5 SOLUTION RESPIRATORY (INHALATION) at 12:07

## 2024-07-09 RX ADMIN — POTASSIUM & SODIUM PHOSPHATES POWDER PACK 280-160-250 MG 1 PACKET: 280-160-250 PACK at 11:07

## 2024-07-09 RX ADMIN — PANTOPRAZOLE SODIUM 40 MG: 40 TABLET, DELAYED RELEASE ORAL at 09:07

## 2024-07-09 NOTE — PROGRESS NOTES
[facility]  General Surgery  Progress Note    Subjective:     History of Present Illness:  Patient with complex medical history including HTN, HLD, HFwpEF, CAD, emphysema, GI bleed, and recent prolonged hospital admission for incarcerated inguinal hernia requiring exploratory laparotomy for ischemic bowel. She has been recovering quite well over the past few weeks but yesterday began to experience increasing abdominal pain and distention, nausea, and emesis. She does not note any inciting factors. Has not had any similar episodes since her last hospitalization.  On evaluation in the emergency room a CT scan of the abdomen and pelvis was performed revealing dilated small bowel loops with transition point in the mid-abdomen.  NG tube placement was attempted, but failed due to coiling.  Labs on admission revealed leukocytosis to 19, and a decreased GFR of 53.  General surgery has been consulted for evaluation.    Post-Op Info:  Procedure(s) (LRB):  LAPAROTOMY, EXPLORATORY (N/A)         Interval History: Consistent bowel movements overnight. Feeling much better with soft, NTND abd on exam.   Tolerating clear liquid diet    Medications:  Continuous Infusions:  Scheduled Meds:   albuterol-ipratropium  3 mL Nebulization Q6H    allopurinoL  200 mg Oral Daily    atorvastatin  80 mg Oral Daily    heparin (porcine)  5,000 Units Subcutaneous Q8H    magnesium oxide  400 mg Oral BID    oxymetazoline  2 spray Each Nostril BID    pantoprazole  40 mg Oral BID    potassium, sodium phosphates  1 packet Oral QID (AC & HS)     PRN Meds:  Current Facility-Administered Medications:     acetaminophen, 650 mg, Oral, Q8H PRN    ammonium lactate, , Topical (Top), BID PRN    hydrALAZINE, 5 mg, Intravenous, Q6H PRN    hydrOXYzine HCL, 25 mg, Per NG tube, TID PRN    LIDOcaine (PF) 10 mg/ml (1%), 1 mL, Intradermal, Once PRN    ondansetron, 4 mg, Intravenous, Q6H PRN    sodium chloride 0.9%, 10 mL, Intravenous, PRN     Review of patient's  allergies indicates:   Allergen Reactions    Hydromorphone Anxiety, Other (See Comments) and Hallucinations     Severe agitation     Objective:     Vital Signs (Most Recent):  Temp: 98.9 °F (37.2 °C) (07/09/24 0721)  Pulse: 75 (07/09/24 0721)  Resp: 18 (07/09/24 0721)  BP: 134/72 (07/09/24 0721)  SpO2: (!) 90 % (07/09/24 0721) Vital Signs (24h Range):  Temp:  [97.7 °F (36.5 °C)-98.9 °F (37.2 °C)] 98.9 °F (37.2 °C)  Pulse:  [] 75  Resp:  [18-24] 18  SpO2:  [90 %-100 %] 90 %  BP: (124-139)/(62-82) 134/72     Weight: 52.2 kg (115 lb)  Body mass index is 20.37 kg/m².    Intake/Output - Last 3 Shifts         07/07 0700  07/08 0659 07/08 0700 07/09 0659 07/09 0700  07/10 0659    P.O.  1080     NG/GT 50      Total Intake(mL/kg) 50 (1) 1080 (20.7)     Urine (mL/kg/hr) 1400 (1.1) 300 (0.2)     Drains       Stool 0 0     Total Output 1400 300     Net -1350 +780            Urine Occurrence  7 x     Stool Occurrence 3 x 3 x              Physical Exam  Constitutional:       General: She is not in acute distress.     Comments: Thin female. Appears comfortable   HENT:      Head: Normocephalic.      Mouth/Throat:      Mouth: Mucous membranes are moist.   Eyes:      General: No scleral icterus.     Extraocular Movements: Extraocular movements intact.      Conjunctiva/sclera: Conjunctivae normal.   Cardiovascular:      Rate and Rhythm: Normal rate.      Pulses: Normal pulses.   Pulmonary:      Effort: Pulmonary effort is normal. No respiratory distress.   Abdominal:      General: There is no distension.      Palpations: Abdomen is soft.      Tenderness: There is no abdominal tenderness. There is no guarding.      Comments: Soft and nondistended   Neurological:      General: No focal deficit present.      Mental Status: She is alert.   Psychiatric:         Mood and Affect: Mood normal.         Behavior: Behavior normal.          Significant Labs:  I have reviewed all pertinent lab results within the past 24  hours.    Significant Diagnostics:  I have reviewed all pertinent imaging results/findings within the past 24 hours.  Assessment/Plan:     * Bowel obstruction  Patient with complex medical history including HTN, HLD, HFwpEF, CAD, emphysema, GI bleed, and recent prolonged hospital admission for incarcerated inguinal hernia requiring exploratory laparotomy for ischemic bowel.  Now presenting with signs/symptoms of partial small bowel obstruction.  CT scan with evidence of what seems to be an adhesive band with a midabdomen.  No peritonitic signs right now.    - Reg diet  - Will reassess this pm to see if she tolerates a reg diet and is well oxygenated without O2 supplementation. Likely d/c today.  - Continue maintenance fluids  - p.r.n. hydralazine  - multimodal pain control  - home meds  - Protonix  - DVT prophylaxis with heparin        Armani Zepeda MD  General Surgery  [facility]

## 2024-07-09 NOTE — PLAN OF CARE
Pt discharged home with daughter.  Discharge instructions given with teach back.  PIV removed.  No meds prescribed.

## 2024-07-09 NOTE — ASSESSMENT & PLAN NOTE
- consult received for evaluation of skin injury.  - pt with recent prolonged hospital admission for incarcerated inguinal hernia requiring exploratory laparotomy for ischemic bowel.  - pt known to wound care dept and seen previously for skin injury to sacrum.  - chronic wound to the area which heals and reopens frequently.  - continue Triad bid/prn.  - Immerse surface with appropriate settings.  - pt ambulatory. Seen walking from restroom with walker.  - encouraged to turn q2h.  - isaias score documented at 18 with a nutrition sub scale score of 2.  - nursing to maintain pressure injury prevention measures and continue wound care per orders.

## 2024-07-09 NOTE — PLAN OF CARE
Problem: Adult Inpatient Plan of Care  Goal: Plan of Care Review  7/9/2024 0610 by Lenore Mendez RN  Outcome: Progressing  7/9/2024 0232 by Lenore Mendez RN  Outcome: Progressing  7/9/2024 0224 by Lenore Mendez RN  Outcome: Progressing  7/9/2024 0215 by Lenore Mendez, RN  Outcome: Progressing  Goal: Patient-Specific Goal (Individualized)  7/9/2024 0610 by Lenore Mendez RN  Outcome: Progressing  7/9/2024 0232 by Lenore Mendez RN  Outcome: Progressing  7/9/2024 0224 by Lenore Mendez, RN  Outcome: Progressing  7/9/2024 0215 by Lenore Mendez RN  Outcome: Progressing  Goal: Absence of Hospital-Acquired Illness or Injury  7/9/2024 0610 by Lenore Mendez RN  Outcome: Progressing  7/9/2024 0232 by Lenore Mendez, RN  Outcome: Progressing  7/9/2024 0224 by Lenore Mendez, RN  Outcome: Progressing  7/9/2024 0215 by Lenore Mendez RN  Outcome: Progressing  Goal: Optimal Comfort and Wellbeing  7/9/2024 0610 by Lenore Mendez RN  Outcome: Progressing  7/9/2024 0232 by Lenore Mendez RN  Outcome: Progressing  7/9/2024 0224 by Lenore Mendez, RN  Outcome: Progressing  7/9/2024 0215 by Lenore Mendez, RN  Outcome: Progressing  Goal: Readiness for Transition of Care  7/9/2024 0610 by Lenore Mendez, RN  Outcome: Progressing  7/9/2024 0232 by Lenore Mendez RN  Outcome: Progressing  7/9/2024 0224 by Lenore Mendez, RN  Outcome: Progressing  7/9/2024 0215 by Lenore Mendez RN  Outcome: Progressing     Problem: Wound  Goal: Optimal Coping  7/9/2024 0610 by Lenore Mendez, RN  Outcome: Progressing  7/9/2024 0232 by Lenore Mendez, RN  Outcome: Progressing  7/9/2024 0224 by Lenore Mendez, RN  Outcome: Progressing  7/9/2024 0215 by Lenore Mendez RN  Outcome: Progressing  Goal: Optimal Functional Ability  7/9/2024 0610 by Lenore Mendez RN  Outcome: Progressing  7/9/2024 0232 by Lenore Mendez RN  Outcome: Progressing  7/9/2024 0224 by Lenore Mendez RN  Outcome: Progressing  7/9/2024 0215 by Lenore Mendez,  RN  Outcome: Progressing  Goal: Absence of Infection Signs and Symptoms  7/9/2024 0610 by Lenore Mendez, RN  Outcome: Progressing  7/9/2024 0232 by Lenore Mendez, RN  Outcome: Progressing  7/9/2024 0224 by Lenore Mendez, RN  Outcome: Progressing  7/9/2024 0215 by Lenore Mendez, RN  Outcome: Progressing  Goal: Improved Oral Intake  7/9/2024 0610 by Lenore Mendez, RN  Outcome: Progressing  7/9/2024 0232 by Lenore Mendez, RN  Outcome: Progressing  7/9/2024 0224 by Lenore Mendez, RN  Outcome: Progressing  7/9/2024 0215 by Lenore Mendez, RN  Outcome: Progressing  Goal: Optimal Pain Control and Function  7/9/2024 0610 by Lenore Mendez, RN  Outcome: Progressing  7/9/2024 0232 by Lenore Mendez, RN  Outcome: Progressing  7/9/2024 0224 by Lenore Mendez, RN  Outcome: Progressing  7/9/2024 0215 by Lenore Mendez, RN  Outcome: Progressing  Goal: Skin Health and Integrity  7/9/2024 0610 by Lenore Mendez, RN  Outcome: Progressing  7/9/2024 0232 by Lenore Mendez, RN  Outcome: Progressing  7/9/2024 0224 by Lenore Mendez, RN  Outcome: Progressing  7/9/2024 0215 by Lenore Mendez, RN  Outcome: Progressing  Goal: Optimal Wound Healing  7/9/2024 0610 by Lenore Mendez, RN  Outcome: Progressing  7/9/2024 0232 by Lenore Mendez, RN  Outcome: Progressing  7/9/2024 0224 by Lenore Mendez, RN  Outcome: Progressing  7/9/2024 0215 by Lenore Mendez, RN  Outcome: Progressing

## 2024-07-09 NOTE — PLAN OF CARE
[facility] Plan of Care Note     Dx:   Anxiety [F41.9]  Small bowel obstruction [K56.609]  Encounter for nasogastric (NG) tube placement [Z46.59]  Abdominal pain [R10.9]     Shift Events: none     Goals of Care: Progressing towards goals     Neuro: AOx4     Vital Signs:stable     Respiratory: ra     Diet: regular        Is patient tolerating current diet? Yes      GTTS: n/a     Urine Output/Bowel Movement:   I/O: flowsheets  Last Bowel Movement: 07/08/24        Drains/Tubes/Tube Feeds (include total output/shift):   No intake/output data recorded.        Lines: PIV x 1        Accuchecks:N/A     Skin: Wound x 2     Fall Risk Score: 9     Activity level? Assist x 1     Any scheduled procedures? N/a     Any safety concerns? N/a     Other: tele/external cath

## 2024-07-09 NOTE — DISCHARGE SUMMARY
[facility]  General Surgery  Discharge Summary      Patient Name: Jessica Floyd  MRN: [id number]  Admission Date: 7/4/2024  Hospital Length of Stay: 5 days  Discharge Date and Time:  07/09/2024 12:55 PM  Attending Physician: Rick Saunders MD   Discharging Provider: Jose Armando Knight PA-C  Primary Care Provider: Effie Olmedo MD     HPI:   Patient with complex medical history including HTN, HLD, HFwpEF, CAD, emphysema, GI bleed, and recent prolonged hospital admission for incarcerated inguinal hernia requiring exploratory laparotomy for ischemic bowel. She has been recovering quite well over the past few weeks but yesterday began to experience increasing abdominal pain and distention, nausea, and emesis. She does not note any inciting factors. Has not had any similar episodes since her last hospitalization. On evaluation in the emergency room a CT scan of the abdomen and pelvis was performed revealing dilated small bowel loops with transition point in the mid-abdomen. NG tube placement was attempted, but failed due to coiling. Labs on admission revealed leukocytosis to 19, and a decreased GFR of 53. General surgery has been consulted for evaluation.     Procedure(s) (LRB):  LAPAROTOMY, EXPLORATORY (N/A)     Hospital Course:   Patient was admitted to the general surgery service. she was made NPO, given IVF, as well as pain and nausea control. She underwent conservative m,management of SBO with bowel rest, NGT decompression, and gastrograffin challenge. GGC with contrast into colon, had ROBF, diet progressed. The patient's clinical condition progressively improved. Had ROBF. Patient was HDS throughout admission. By the time of discharge, she was meeting all discharge milestones, tolerating a diet without nausea or vomiting, pain was well controlled with oral medications, and she was ambulating without difficulty. Voiding appropriately. On hospital day5,  the patient was discharged to home with  family. The patient will follow up in her PCP's clinic in 2 weeks. Discussed POC and ED precautions with patient. Patient verbalized understanding and is agreeable to plan. All questions answered.    Please see hospital and op notes for further detail regarding patient's admission.    Patient's discharge was discussed with Dr. Saunders.       Consults (From admission, onward)          Status Ordering Provider     Inpatient consult to Midline team  Once        Provider:  (Not yet assigned)    Completed MAGY SAUNDERS     Inpatient consult to Skin Integrity  Practitioner  Once        Provider:  Lenore Oconnell, NP    Acknowledged MAGY SAUNDERS     Inpatient consult to General surgery  Once        Provider:  (Not yet assigned)    Completed FRANCISCA JOHNSON              Indwelling Lines/Drains at time of discharge:   Lines/Drains/Airways       Drain  Duration             Female External Urinary Catheter w/ Suction 07/04/24 1234 5 days                    Significant Diagnostic Studies: Labs: CMP   Recent Labs   Lab 07/08/24  0531 07/09/24  0331    138   K 2.9* 3.4*    104   CO2 25 27    89   BUN 8 7*   CREATININE 0.7 0.6   CALCIUM 8.3* 8.0*   PROT 6.0 5.4*   ALBUMIN 2.5* 2.3*   BILITOT 0.7 0.6   ALKPHOS 63 51*   AST 16 15   ALT 6* 5*   ANIONGAP 8 7*   , CBC   Recent Labs   Lab 07/08/24  0531 07/09/24  0331   WBC 12.22 7.66   HGB 9.6* 8.4*   HCT 32.8* 27.1*    202   , and All labs within the past 24 hours have been reviewed  Radiology:     XR Gastrograffin Challenge [[id number]] Resulted: 07/08/24 0838   Order Status: Completed Updated: 07/08/24 0841   Narrative:     EXAMINATION:  XR GASTROGRAFFIN CHALLENGE    CLINICAL HISTORY:  small bowel obstruction;    TECHNIQUE:  KUB    COMPARISON:  07/07/2024 13:04 p.m.    FINDINGS:  Enteric tube distal tip within the stomach.    Several contrast filled loop small bowel, less distended compared to the prior exam.  Some air distension of the transverse  "colon.  No significant stool retention.    The lung bases are clear.    No organomegaly.    Postoperative changes of the left hip joint.   Impression:       Less distension of the small bowel compared to the prior exam.      Electronically signed by: Joan Fernández MD  Date: 07/08/2024  Time: 08:38   XR Gastrograffin Challenge [[id number]] Resulted: 07/08/24 0708   Order Status: Completed Updated: 07/08/24 0711   Narrative:     EXAMINATION:  XR GASTROGRAFFIN CHALLENGE    CLINICAL HISTORY:  small bowel obstruction;    TECHNIQUE:  Single supine AP image of abdomen acquired and labeled "4 hours GGC"    COMPARISON:  Present exam interpreted after review of abdomen and pelvis CT of July 4, 2024 and abdominal images from July 4 and July 6, 2024.    FINDINGS:  As seen on yesterday's abdomen exam, reconfirmed enteric tube in which the tip and side port hole or in the upper stomach beneath the left hemidiaphragm.  This exam confirms extensive centrally positioned  dilated small bowel loops, the majority contrast filled though a few are air distended.  The stomach is not distended.  I do not appreciate any nondilated small bowel loops containing air or contrast.  Little if any identifiable: Based on this single image and certainly no distended colon with air, contrast, or fecal material.  Some minimal scattered gas and fecal matter seen in the rectal vault.  Based on the single image, no free air or extravasated contrast.  In this patient whose of above CT head documented abnormal bowel-gas pattern findings consistent with high-grade small bowel obstruction, the present exam would suggest some aspect of ongoing small bowel obstruction.  Clinical correlation.  EKG leads superimposed chest and abdomen.  Mild lumbar levocurvature.  Degenerative changes spine.  Changes of left total hip procedure, visualized hardware in satisfactory alignment and position.   Impression:       As above      Electronically signed by: Manny" Aidan  Date: 07/08/2024  Time: 07:08   XR NG/OG tube placement check, non-radiologist performed [[id number]] Resulted: 07/06/24 0846   Order Status: Completed Updated: 07/06/24 0849   Narrative:     EXAMINATION:  XR NG/OG TUBE PLACEMENT CHECK, NON-RADIOLOGIST PERFORMED    CLINICAL HISTORY:  NGT replacement;    TECHNIQUE:  XR NG/OG TUBE PLACEMENT CHECK, NON-RADIOLOGIST PERFORMED    FINDINGS:  The bowel gas pattern is non-specific.Nasogastric tube level of the LEFT upper quadrant region of stomach.  Small bowel distension.    No airspace consolidation at the lung bases.No acute bony abnormality.No abnormal soft tissue masses.No abnormal calcific densities.   Impression:       Nasogastric tube in place LEFT upper quadrant body of stomach..      Electronically signed by: Valdo Li MD  Date: 07/06/2024  Time: 08:46   X-Ray Chest AP Portable [[id number]] Resulted: 07/05/24 0713   Order Status: Completed Updated: 07/05/24 0715   Narrative:     EXAMINATION:  XR CHEST AP PORTABLE    CLINICAL HISTORY:  aspiration risk;    TECHNIQUE:  Single frontal view of the chest was performed.    COMPARISON:  Chest radiograph 07/04/2024, CT abdomen pelvis 07/04/2024    FINDINGS:  Cardiac monitoring leads overlie the chest.  Enteric tube tip and radiolucent portion terminating in the stomach.    Mediastinal structures are midline. The cardiac silhouette is normal in size.  Atherosclerotic calcifications overlie the aortic arch.  The hilar and mediastinal contours are unremarkable.    The lungs are symmetrically expanded with lower lung zone predominant reticular opacities.  Bibasilar aeration is slightly worse when compared with prior study.  Normal appearance of the pulmonary vasculature. No focal or lobar consolidation.    No pneumothorax. No pleural effusion.    Gaseous distension of bowel partially imaged in the upper abdomen in this patient with known small bowel obstruction.   Impression:       Interval advancement of enteric  tube, with the tip and side port now overlying the stomach below the level of the diaphragm.    Minimally worse bibasilar interstitial opacities, potentially related to small volume aspiration.    Electronically signed by resident: Sabrina Patterson  Date: 07/05/2024  Time: 05:57    Electronically signed by: Rick Liu MD  Date: 07/05/2024  Time: 07:13   X-Ray Abdomen AP 1 View [[id number]] Resulted: 07/04/24 2010   Order Status: Completed Updated: 07/04/24 2013   Narrative:     EXAMINATION:  XR ABDOMEN AP 1 VIEW    CLINICAL HISTORY:  NGT placement - please do full KUB, tube is kinking in upper GI;    TECHNIQUE:  AP View(s) of the abdomen was performed.    COMPARISON:  07/04/2024 13:51 hours    FINDINGS:  The NG tube previously kinked at the GE junction now is linear with its side port over the distal 3rd of the thoracic esophagus and its tip over the fundus of the stomach.  Contrast within the urinary bladder from previous CT scan.  Ileus type small bowel loops in the jejunal region on the left side of the abdomen.  Atherosclerotic disease in the thoracic and abdominal aorta.   Impression:       NG tube now un kinked linear with side port over the thoracic esophagus and tip over the cardia of the stomach.  Consider slight advancement.      Electronically signed by: Danis Ortiz  Date: 07/04/2024  Time: 20:10   X-Ray Chest AP Portable [[id number]] Resulted: 07/04/24 1812   Order Status: Completed Updated: 07/04/24 1815   Narrative:     EXAMINATION:  XR CHEST AP PORTABLE    CLINICAL HISTORY:  increased oxygen requirement;    TECHNIQUE:  Single frontal portable view of the chest was performed.    COMPARISON:  Chest x-ray 07/04/2024    FINDINGS:  Nasogastric tube with tip coiled in the esophagus, narrowing the more proximal location.  Partially visualized carotid artery stent.    Lung are symmetrically expanded.  The lungs are clear. No focal consolidation. Normal appearance of pulmonary vasculature. No significant  pleural effusion. No pneumothorax.    The cardiomediastinal silhouette is normal in size. The hilar and mediastinal contours are normal.    Osseous structures are unchanged.   Impression:       Nasogastric tube remains coiled within the esophagus and does not pass below the diaphragm.    Findings discussed with Amol Loera MD by phone at 17:45 07/04/2024 by Elder Cheney MD    Electronically signed by resident: Elder Cheney  Date: 07/04/2024  Time: 17:17    Electronically signed by: Marquis Dean MD  Date: 07/04/2024  Time: 18:12   X-Ray Abdomen AP 1 View (KUB) [[id number]] Resulted: 07/04/24 1637   Order Status: Completed Updated: 07/04/24 1640   Narrative:     EXAMINATION:  XR ABDOMEN AP 1 VIEW    CLINICAL HISTORY:  Encounter for fitting and adjustment of other gastrointestinal appliance and device    TECHNIQUE:  AP View(s) of the abdomen was performed.    COMPARISON:  Radiograph from earlier today.    FINDINGS:  There is a nasogastric tube.  The tip remains above the diaphragm.  Position is not significantly changed from prior.  The bowel gas pattern is nonspecific.  Lung bases are clear.  Osseous structures demonstrate degenerative changes.   Impression:       Nasogastric tube as above.      Electronically signed by: Jose An  Date: 07/04/2024  Time: 16:37   XR NG/OG tube placement check, non-radiologist performed [[id number]] Resulted: 07/04/24 1635   Order Status: Completed Updated: 07/04/24 1638   Narrative:     EXAMINATION:  XR NG/OG TUBE PLACEMENT CHECK, NON-RADIOLOGIST PERFORMED    CLINICAL HISTORY:  NGT;  Encounter for fitting and adjustment of other gastrointestinal appliance and device    TECHNIQUE:  AP radiograph of the abdomen.    COMPARISON:  05/25/2024.    FINDINGS:  There is a nasogastric tube with the tip above the diaphragm.  The partially visualized bowel gas pattern is nonspecific.  The lung bases are clear.  The cardiac silhouette is unremarkable.  There are vascular  calcifications.  Osseous structures demonstrate degenerative changes.   Impression:       Nasogastric tube as above.      Electronically signed by: Jose An  Date: 07/04/2024  Time: 16:35   CT Abdomen Pelvis With IV Contrast NO Oral Contrast [[id number]] Resulted: 07/04/24 1416   Order Status: Completed Updated: 07/04/24 1419   Narrative:     EXAMINATION:  CT ABDOMEN PELVIS WITH IV CONTRAST    CLINICAL HISTORY:  Abdominal abscess/infection suspected;    TECHNIQUE:  Low dose axial images, sagittal and coronal reformations were obtained from the lung bases to the pubic symphysis following the IV administration of 75 mL of Omnipaque 350 .  Oral contrast was not given.    COMPARISON:  Abdomen x-ray dated 05/25/2024    CT dated 05/17/2024.    FINDINGS:  There are no pleural effusions.  There is no evidence of a pneumothorax.  There are airspace opacities in the left lung base.    The heart is unremarkable.  There is normal tapering of the abdominal aorta.  There are extensive calcifications in the abdominal aorta and its branch vessels.  The celiac trunk, SMA, and CORRINE remain patent.  The portal veins and mesenteric veins are within normal limits.  The IVC and the remainder of the venous structures are within normal limits.    There is no evidence of lymphadenopathy in the abdomen or pelvis.    There is fluid noted in the distal aspect of the esophagus.  The stomach is unremarkable.  The duodenum is within normal limits.  There is distention of the small bowel loops with multiple differential air-fluid levels.  There is transition into normal small bowel loops in the right of midline.    The appendix is not visualized.  There are no secondary findings of acute appendicitis.  The large bowel loops are unremarkable.    There is a 2.7 cm simple cyst in the left hepatic lobe.  There are additional low-attenuation lesions in the liver.  The gallbladder is unremarkable.  The biliary tree is within normal limits.  The  spleen is unremarkable.  There is mild dilatation of the main pancreatic duct.  The pancreas is otherwise unremarkable.  The adrenal glands are unremarkable.    There are multiple low-attenuation lesions in both kidneys, too small complete characterization.  The ureters and urinary bladder are unremarkable.  The patient appears status post hysterectomy.    There is small amount of perihepatic ascites.  There is no evidence of free air.  There is no evidence of pneumatosis.  No portal venous air is identified.    The psoas margins are unremarkable.  The abdominal wall is unremarkable.  The changes of prior left hip arthroplasty.  There are degenerative changes in the osseous structures.  There is grade 1 anterolisthesis of L4 on L5.  There are chronic compression deformities of the T12 and L3 vertebral bodies.   Impression:       Findings of high-grade small bowel obstruction with transition point in the right mid abdomen.    Small amount of ascites.    Additional findings as above.    Initial findings discussed with Dr. Bernard by Dr. Liu on 07/04/2024 at 11:26      Electronically signed by: Marquis Dean MD  Date: 07/04/2024  Time: 14:16   X-Ray Chest AP Portable [[id number]] Resulted: 07/04/24 1359   Order Status: Completed Updated: 07/04/24 1401   Narrative:     EXAMINATION:  XR CHEST AP PORTABLE    CLINICAL HISTORY:  Chest Pain;    TECHNIQUE:  Single frontal view of the chest was performed.    COMPARISON:  05/23/2024.    FINDINGS:  The lungs are well expanded and clear. No focal opacities are seen. The pleural spaces are clear. The cardiac silhouette is unremarkable.  There are calcifications of the aortic arch.  The visualized osseous structures demonstrate degenerative changes.   Impression:       No acute abnormality.       Pending Diagnostic Studies:       None            Final Active Diagnoses:      Problems Resolved During this Admission:    Diagnosis Date Noted Date Resolved POA    PRINCIPAL PROBLEM:   Bowel obstruction [K56.609] 05/17/2024 07/09/2024 Yes        Discharged Condition: good    Disposition: Home or Self Care    Follow Up:   Follow-up Information       Effie Olmedo MD Follow up in 2 week(s).    Specialty: Family Medicine  Why: Hospital follow up  Contact information:  [address] [address]  [phone]                             Patient Instructions:      Diet Cardiac     Notify your health care provider if you experience any of the following:  increased confusion or weakness     Notify your health care provider if you experience any of the following:  persistent dizziness, light-headedness, or visual disturbances     Notify your health care provider if you experience any of the following:  worsening rash     Notify your health care provider if you experience any of the following:  severe persistent headache     Notify your health care provider if you experience any of the following:  difficulty breathing or increased cough     Notify your health care provider if you experience any of the following:  redness, tenderness, or signs of infection (pain, swelling, redness, odor or green/yellow discharge around incision site)     Notify your health care provider if you experience any of the following:  severe uncontrolled pain     Notify your health care provider if you experience any of the following:  persistent nausea and vomiting or diarrhea     Notify your health care provider if you experience any of the following:  temperature >100.4     Activity as tolerated       Medications:  Reconciled Home Medications:      Medication List        START taking these medications      acetaminophen 500 MG tablet  Commonly known as: TYLENOL  Take 2 tablets (1,000 mg total) by mouth every 8 (eight) hours as needed for Pain.  Replaces: acetaminophen 650 MG Tbsr     polyethylene glycol 17 gram Pwpk  Commonly known as: GLYCOLAX  Take 17 g by mouth daily as needed for Constipation.    "         CONTINUE taking these medications      acetaminophen 650 MG Tbsr  Commonly known as: TYLENOL  Replaced by: acetaminophen 500 MG tablet     allopurinoL 100 MG tablet  Commonly known as: ZYLOPRIM     amLODIPine 5 MG tablet  Commonly known as: NORVASC     amoxicillin-clavulanate 875-125mg 875-125 mg per tablet  Commonly known as: AUGMENTIN     atorvastatin 80 MG tablet  Commonly known as: LIPITOR     capsaicin 0.025 % cream  Commonly known as: ZOSTRIX     celecoxib 200 MG capsule  Commonly known as: CeleBREX     colchicine 0.6 mg tablet  Commonly known as: COLCRYS     cyanocobalamin 1,000 mcg/mL injection     fluconazole 150 MG Tab  Commonly known as: DIFLUCAN     fluticasone furoate-vilanteroL 100-25 mcg/dose diskus inhaler  Commonly known as: BREO     fluticasone propion-salmeterol 115-21 mcg/dose 115-21 mcg/actuation Hfaa inhaler  Commonly known as: ADVAIR HFA     fluticasone propionate 50 mcg/actuation nasal spray  Commonly known as: FLONASE     furosemide 20 MG tablet  Commonly known as: LASIX     hydroCHLOROthiazide 12.5 MG Tab  Commonly known as: HYDRODIURIL     HYDROcodone-acetaminophen 5-325 mg per tablet  Commonly known as: NORCO     INCRUSE ELLIPTA 62.5 mcg/actuation inhalation capsule  Generic drug: umeclidinium     LIDOcaine 4 % cream  Commonly known as: LMX     LIDOcaine-prilocaine cream  Commonly known as: EMLA     melatonin 3 mg tablet  Commonly known as: MELATIN     montelukast 10 mg tablet  Commonly known as: SINGULAIR     needle (disp) 30 gauge 30 gauge x 1/2" Ndle     ondansetron 4 MG Tbdl  Commonly known as: ZOFRAN-ODT     ORTHOVISC 30 mg/2 mL  Generic drug: sodium hyaluronate (orthovisc)     pantoprazole 40 MG tablet  Commonly known as: PROTONIX     QUEtiapine 50 MG tablet  Commonly known as: SEROQUEL     SALINE MIST NASL     SALINE NASAL (ALOE VERA) Gel  Generic drug: sodium chloride-aloe vera     SPIRIVA RESPIMAT 2.5 mcg/actuation inhaler  Generic drug: tiotropium bromide     SYRINGE " "3CC/22GX1" 3 mL 22 gauge x 1" Syrg  Generic drug: syringe with needle     VENOFER 100 mg iron/5 mL injection  Generic drug: iron sucrose                Patient was seen and examined on the date of discharge and determined to be suitable for discharge.  Total time spent preparing discharge services: 15 minutes.  Time was spent speaking with consultants and case management, reviewing records, and/or discussing the plan of care with patient/family.    Jose Armando Knight PA-C  General Surgery   [facility]    "

## 2024-07-09 NOTE — ASSESSMENT & PLAN NOTE
Patient with complex medical history including HTN, HLD, HFwpEF, CAD, emphysema, GI bleed, and recent prolonged hospital admission for incarcerated inguinal hernia requiring exploratory laparotomy for ischemic bowel.  Now presenting with signs/symptoms of partial small bowel obstruction.  CT scan with evidence of what seems to be an adhesive band with a midabdomen.  No peritonitic signs right now.    - Reg diet  - Will reassess this pm to see if she tolerates a reg diet and is well oxygenated without O2 supplementation. Likely d/c today.  - Continue maintenance fluids  - p.r.n. hydralazine  - multimodal pain control  - home meds  - Protonix  - DVT prophylaxis with heparin

## 2024-07-09 NOTE — SUBJECTIVE & OBJECTIVE
Scheduled Meds:   albuterol-ipratropium  3 mL Nebulization Q6H    allopurinoL  200 mg Oral Daily    atorvastatin  80 mg Oral Daily    heparin (porcine)  5,000 Units Subcutaneous Q8H    magnesium oxide  400 mg Oral BID    oxymetazoline  2 spray Each Nostril BID    pantoprazole  40 mg Oral BID    potassium, sodium phosphates  1 packet Oral QID (AC & HS)     Continuous Infusions:  PRN Meds:  Current Facility-Administered Medications:     acetaminophen, 650 mg, Oral, Q8H PRN    ammonium lactate, , Topical (Top), BID PRN    hydrALAZINE, 5 mg, Intravenous, Q6H PRN    hydrOXYzine HCL, 25 mg, Per NG tube, TID PRN    LIDOcaine (PF) 10 mg/ml (1%), 1 mL, Intradermal, Once PRN    ondansetron, 4 mg, Intravenous, Q6H PRN    sodium chloride 0.9%, 10 mL, Intravenous, PRN    Review of patient's allergies indicates:   Allergen Reactions    Hydromorphone Anxiety, Other (See Comments) and Hallucinations     Severe agitation        Past Medical History:   Diagnosis Date    Allergic rhinitis     Amblyopia     Carotid stenosis     Coronary atherosclerosis     Outside [facility] 6/2014: 20% mLAD, 50% mCx, 20%, mRCA    Dyslipidemia     ESBL (extended spectrum beta-lactamase) producing bacteria infection     UTI    Hypertension     Nausea and vomiting 05/26/2017    Pulmonary emphysema 10/28/2023    SAH (subarachnoid hemorrhage) 08/24/2016 1999, s/p clipping    Subarachnoid hemorrhage due to ruptured aneurysm 1999     Past Surgical History:   Procedure Laterality Date    ADENOIDECTOMY      ANTERIOR CRUCIATE LIGAMENT REPAIR  2012    APPENDECTOMY      CATARACT EXTRACTION W/  INTRAOCULAR LENS IMPLANT Right 11/07/2022    Procedure: EXTRACTION, CATARACT, WITH IOL INSERTION;  Surgeon: Higinio Cristina MD;  Location: [facility] OR;  Service: Ophthalmology;  Laterality: Right;    CATARACT EXTRACTION W/  INTRAOCULAR LENS IMPLANT Left 11/21/2022    Procedure: EXTRACTION, CATARACT, WITH IOL INSERTION;  Surgeon: Higinio Cristina MD;  Location: [facility];  Service:  Ophthalmology;  Laterality: Left;    CEREBRAL ANEURYSM REPAIR  1999    clip    COLONOSCOPY N/A 5/16/2024    Procedure: COLONOSCOPY;  Surgeon: Rich Syed MD;  Location: [facility] ENDO (2ND FLR);  Service: Endoscopy;  Laterality: N/A;    DENTAL SURGERY      DIAGNOSTIC LAPAROSCOPY N/A 5/17/2024    Procedure: LAPAROSCOPY, DIAGNOSTIC;  Surgeon: Ruben Oscar MD;  [facility];  Service: General;  Laterality: N/A;    ESOPHAGOGASTRODUODENOSCOPY N/A 5/15/2024    Procedure: EGD (ESOPHAGOGASTRODUODENOSCOPY);  Surgeon: Rich Syed MD;  Location: [facility] ENDO (2ND FLR);  Service: Endoscopy;  Laterality: N/A;    EXCISION, SMALL INTESTINE N/A 5/17/2024    Procedure: EXCISION, SMALL INTESTINE;  Surgeon: Ruben Oscar MD;  [facility];  Service: General;  Laterality: N/A;    EYE SURGERY Bilateral     cataract removal and lens implants    HIP ARTHROPLASTY Left 05/28/2023    Procedure: TOTAL HIP ARTHROPLASTY;  Surgeon: Juan Wan MD;  [facility];  Service: Orthopedics;  Laterality: Left;    LAPAROTOMY, EXPLORATORY N/A 5/17/2024    Procedure: LAPAROTOMY, EXPLORATORY;  Surgeon: Ruben Oscar MD;  [facility];  Service: General;  Laterality: N/A;    REPAIR, HERNIA, INGUINAL Bilateral 5/17/2024    Procedure: REPAIR, HERNIA, INGUINAL;  Surgeon: Ruben Oscar MD;  [facility];  Service: General;  Laterality: Bilateral;    TONSILLECTOMY         Family History       Problem Relation (Age of Onset)    Alcohol abuse Father    Aneurysm Mother    Gout Brother    Heart disease Brother    Hypertension Mother, Father    Kidney disease Brother    No Known Problems Daughter, Daughter, Daughter          Tobacco Use    Smoking status: Former     Current packs/day: 0.00     Average packs/day: 0.5 packs/day for 20.0 years (10.0 ttl pk-yrs)     Types: Cigarettes     Start date: 1/1/1979     Quit date: 1/1/1999     Years since quitting:  25.5     Passive exposure: Past    Smokeless tobacco: Never   Substance and Sexual Activity    Alcohol use: Yes     Alcohol/week: 7.0 standard drinks of alcohol     Types: 7 Glasses of wine per week     Comment: One glass of Red wine prior to dinner    Drug use: No    Sexual activity: Yes     Partners: Male     Review of Systems   Skin:  Positive for wound.       Objective:     Vital Signs (Most Recent):  Temp: 99.6 °F (37.6 °C) (07/09/24 1133)  Pulse: 93 (07/09/24 1133)  Resp: 20 (07/09/24 1133)  BP: 114/66 (07/09/24 1133)  SpO2: 95 % (07/09/24 1133) Vital Signs (24h Range):  Temp:  [97.7 °F (36.5 °C)-99.6 °F (37.6 °C)] 99.6 °F (37.6 °C)  Pulse:  [] 93  Resp:  [18-20] 20  SpO2:  [90 %-100 %] 95 %  BP: (114-139)/(62-82) 114/66     Weight: 49.7 kg (109 lb 9.1 oz)  Body mass index is 19.41 kg/m².     Physical Exam  Constitutional:       Appearance: Normal appearance.   Skin:     General: Skin is warm and dry.      Findings: Lesion present.   Neurological:      Mental Status: She is alert.          Laboratory:  All pertinent labs reviewed within the last 24 hours.    Diagnostic Results:  None

## 2024-07-09 NOTE — SUBJECTIVE & OBJECTIVE
Interval History: Consistent bowel movements overnight. Feeling much better with soft, NTND abd on exam.   Tolerating clear liquid diet    Medications:  Continuous Infusions:  Scheduled Meds:   albuterol-ipratropium  3 mL Nebulization Q6H    allopurinoL  200 mg Oral Daily    atorvastatin  80 mg Oral Daily    heparin (porcine)  5,000 Units Subcutaneous Q8H    magnesium oxide  400 mg Oral BID    oxymetazoline  2 spray Each Nostril BID    pantoprazole  40 mg Oral BID    potassium, sodium phosphates  1 packet Oral QID (AC & HS)     PRN Meds:  Current Facility-Administered Medications:     acetaminophen, 650 mg, Oral, Q8H PRN    ammonium lactate, , Topical (Top), BID PRN    hydrALAZINE, 5 mg, Intravenous, Q6H PRN    hydrOXYzine HCL, 25 mg, Per NG tube, TID PRN    LIDOcaine (PF) 10 mg/ml (1%), 1 mL, Intradermal, Once PRN    ondansetron, 4 mg, Intravenous, Q6H PRN    sodium chloride 0.9%, 10 mL, Intravenous, PRN     Review of patient's allergies indicates:   Allergen Reactions    Hydromorphone Anxiety, Other (See Comments) and Hallucinations     Severe agitation     Objective:     Vital Signs (Most Recent):  Temp: 98.9 °F (37.2 °C) (07/09/24 0721)  Pulse: 75 (07/09/24 0721)  Resp: 18 (07/09/24 0721)  BP: 134/72 (07/09/24 0721)  SpO2: (!) 90 % (07/09/24 0721) Vital Signs (24h Range):  Temp:  [97.7 °F (36.5 °C)-98.9 °F (37.2 °C)] 98.9 °F (37.2 °C)  Pulse:  [] 75  Resp:  [18-24] 18  SpO2:  [90 %-100 %] 90 %  BP: (124-139)/(62-82) 134/72     Weight: 52.2 kg (115 lb)  Body mass index is 20.37 kg/m².    Intake/Output - Last 3 Shifts         07/07 0700 07/08 0659 07/08 0700 07/09 0659 07/09 0700  07/10 0659    P.O.  1080     NG/GT 50      Total Intake(mL/kg) 50 (1) 1080 (20.7)     Urine (mL/kg/hr) 1400 (1.1) 300 (0.2)     Drains       Stool 0 0     Total Output 1400 300     Net -1350 +780            Urine Occurrence  7 x     Stool Occurrence 3 x 3 x              Physical Exam  Constitutional:       General: She is not in  acute distress.     Comments: Thin female. Appears comfortable   HENT:      Head: Normocephalic.      Mouth/Throat:      Mouth: Mucous membranes are moist.   Eyes:      General: No scleral icterus.     Extraocular Movements: Extraocular movements intact.      Conjunctiva/sclera: Conjunctivae normal.   Cardiovascular:      Rate and Rhythm: Normal rate.      Pulses: Normal pulses.   Pulmonary:      Effort: Pulmonary effort is normal. No respiratory distress.   Abdominal:      General: There is no distension.      Palpations: Abdomen is soft.      Tenderness: There is no abdominal tenderness. There is no guarding.      Comments: Soft and nondistended   Neurological:      General: No focal deficit present.      Mental Status: She is alert.   Psychiatric:         Mood and Affect: Mood normal.         Behavior: Behavior normal.          Significant Labs:  I have reviewed all pertinent lab results within the past 24 hours.    Significant Diagnostics:  I have reviewed all pertinent imaging results/findings within the past 24 hours.

## 2024-07-09 NOTE — CONSULTS
[facility]  Skin Integrity PALMER  Consult Note    Patient Name: Jessica Floyd  MRN: [id number]  Admission Date: 7/4/2024  Hospital Length of Stay: 5 days  Attending Physician: Rick Saunders MD  Primary Care Provider: Effie Olmedo MD     Inpatient consult to Skin Integrity  Practitioner  Consult performed by: Lenore Oconnell, PAXTON  Consult ordered by: Rick Saunders MD        Subjective:     History of Present Illness:  Jessica Floyd is a 73 year old female with complex medical history including HTN, HLD, HFwpEF, CAD, emphysema, GI bleed, and recent prolonged hospital admission for incarcerated inguinal hernia requiring exploratory laparotomy for ischemic bowel. She has been recovering quite well over the past few weeks but yesterday began to experience increasing abdominal pain and distention, nausea, and emesis. She does not note any inciting factors. Has not had any similar episodes since her last hospitalization. On evaluation in the emergency room a CT scan of the abdomen and pelvis was performed revealing dilated small bowel loops with transition point in the mid-abdomen. NG tube placement was attempted, but failed due to coiling. Labs on admission revealed leukocytosis to 19, and a decreased GFR of 53. General surgery has been consulted for evaluation. Skin integrity PALMER consulted for evaluation of skin injury.    Scheduled Meds:   albuterol-ipratropium  3 mL Nebulization Q6H    allopurinoL  200 mg Oral Daily    atorvastatin  80 mg Oral Daily    heparin (porcine)  5,000 Units Subcutaneous Q8H    magnesium oxide  400 mg Oral BID    oxymetazoline  2 spray Each Nostril BID    pantoprazole  40 mg Oral BID    potassium, sodium phosphates  1 packet Oral QID (AC & HS)     Continuous Infusions:  PRN Meds:  Current Facility-Administered Medications:     acetaminophen, 650 mg, Oral, Q8H PRN    ammonium lactate, , Topical (Top), BID PRN    hydrALAZINE, 5 mg, Intravenous, Q6H PRN    hydrOXYzine HCL, 25 mg,  Per NG tube, TID PRN    LIDOcaine (PF) 10 mg/ml (1%), 1 mL, Intradermal, Once PRN    ondansetron, 4 mg, Intravenous, Q6H PRN    sodium chloride 0.9%, 10 mL, Intravenous, PRN    Review of patient's allergies indicates:   Allergen Reactions    Hydromorphone Anxiety, Other (See Comments) and Hallucinations     Severe agitation        Past Medical History:   Diagnosis Date    Allergic rhinitis     Amblyopia     Carotid stenosis     Coronary atherosclerosis     Outside [facility] 6/2014: 20% mLAD, 50% mCx, 20%, mRCA    Dyslipidemia     ESBL (extended spectrum beta-lactamase) producing bacteria infection     UTI    Hypertension     Nausea and vomiting 05/26/2017    Pulmonary emphysema 10/28/2023    SAH (subarachnoid hemorrhage) 08/24/2016 1999, s/p clipping    Subarachnoid hemorrhage due to ruptured aneurysm 1999     Past Surgical History:   Procedure Laterality Date    ADENOIDECTOMY      ANTERIOR CRUCIATE LIGAMENT REPAIR  2012    APPENDECTOMY      CATARACT EXTRACTION W/  INTRAOCULAR LENS IMPLANT Right 11/07/2022    Procedure: EXTRACTION, CATARACT, WITH IOL INSERTION;  Surgeon: Higinio Cristina MD;  Location: [facility];  Service: Ophthalmology;  Laterality: Right;    CATARACT EXTRACTION W/  INTRAOCULAR LENS IMPLANT Left 11/21/2022    Procedure: EXTRACTION, CATARACT, WITH IOL INSERTION;  Surgeon: Higinio Cristina MD;  Location: [facility];  Service: Ophthalmology;  Laterality: Left;    CEREBRAL ANEURYSM REPAIR  1999    clip    COLONOSCOPY N/A 5/16/2024    Procedure: COLONOSCOPY;  Surgeon: Rich Syed MD;  [facility] ([address] [place] [address]);  Service: Endoscopy;  Laterality: N/A;    DENTAL SURGERY      DIAGNOSTIC LAPAROSCOPY N/A 5/17/2024    Procedure: LAPAROSCOPY, DIAGNOSTIC;  Surgeon: Ruben Oscar MD;  [address];  Service: General;  Laterality: N/A;    ESOPHAGOGASTRODUODENOSCOPY N/A 5/15/2024    Procedure: EGD (ESOPHAGOGASTRODUODENOSCOPY);  Surgeon: Rich Syed MD;  [facility] ([facility]);   Service: Endoscopy;  Laterality: N/A;    EXCISION, SMALL INTESTINE N/A 2024    Procedure: EXCISION, SMALL INTESTINE;  Surgeon: Ruben Oscar MD;  Location: [facility] OR 2ND FLR;  Service: General;  Laterality: N/A;    EYE SURGERY Bilateral     cataract removal and lens implants    HIP ARTHROPLASTY Left 2023    Procedure: TOTAL HIP ARTHROPLASTY;  Surgeon: Juan Wan MD;  Location: [facility] OR [facility] FLR;  Service: Orthopedics;  Laterality: Left;    LAPAROTOMY, EXPLORATORY N/A 2024    Procedure: LAPAROTOMY, EXPLORATORY;  Surgeon: Ruben Oscar MD;  Location: [facility] OR 2ND FLR;  Service: General;  Laterality: N/A;    REPAIR, HERNIA, INGUINAL Bilateral 2024    Procedure: REPAIR, HERNIA, INGUINAL;  Surgeon: Ruben Oscar MD;  Location: [facility] OR 2ND FLR;  Service: General;  Laterality: Bilateral;    TONSILLECTOMY         Family History       Problem Relation (Age of Onset)    Alcohol abuse Father    Aneurysm Mother    Gout Brother    Heart disease Brother    Hypertension Mother, Father    Kidney disease Brother    No Known Problems Daughter, Daughter, Daughter          Tobacco Use    Smoking status: Former     Current packs/day: 0.00     Average packs/day: 0.5 packs/day for 20.0 years (10.0 ttl pk-yrs)     Types: Cigarettes     Start date: 1979     Quit date: 1999     Years since quittin.5     Passive exposure: Past    Smokeless tobacco: Never   Substance and Sexual Activity    Alcohol use: Yes     Alcohol/week: 7.0 standard drinks of alcohol     Types: 7 Glasses of wine per week     Comment: One glass of Red wine prior to dinner    Drug use: No    Sexual activity: Yes     Partners: Male     Review of Systems   Skin:  Positive for wound.       Objective:     Vital Signs (Most Recent):  Temp: 99.6 °F (37.6 °C) (24 1133)  Pulse: 93 (24)  Resp: 20 (24)  BP: 114/66 (24)  SpO2: 95 % (24) Vital Signs (24h Range):  Temp:   [97.7 °F (36.5 °C)-99.6 °F (37.6 °C)] 99.6 °F (37.6 °C)  Pulse:  [] 93  Resp:  [18-20] 20  SpO2:  [90 %-100 %] 95 %  BP: (114-139)/(62-82) 114/66     Weight: 49.7 kg (109 lb 9.1 oz)  Body mass index is 19.41 kg/m².     Physical Exam  Constitutional:       Appearance: Normal appearance.   Skin:     General: Skin is warm and dry.      Findings: Lesion present.   Neurological:      Mental Status: She is alert.          Laboratory:  All pertinent labs reviewed within the last 24 hours.    Diagnostic Results:  None      Assessment/Plan:         PALMER Skin Integrity Evaluation      Skin Integrity PALMER evaluation of patient as part of the comprehensive skin care team.     She has been admitted for 5 days. Skin injury was noted on 7/4/24. POA yes.    Sacrum      Orthopedic  Skin ulcer of sacrum, with unspecified severity  - consult received for evaluation of skin injury.  - pt with recent prolonged hospital admission for incarcerated inguinal hernia requiring exploratory laparotomy for ischemic bowel.  - pt known to wound care dept and seen previously for skin injury to sacrum.  - chronic wound to the area which heals and reopens frequently.  - continue Triad bid/prn.  - Immerse surface with appropriate settings.  - pt ambulatory. Seen walking from restroom with walker.  - encouraged to turn q2h.  - isaias score documented at 18 with a nutrition sub scale score of 2.  - nursing to maintain pressure injury prevention measures and continue wound care per orders.        Thank you for your consult. I will follow-up with patient. Please contact us if you have any additional questions.    Lenore Oconnell, NP  Skin Integrity PALMER  [facility]

## 2024-07-09 NOTE — HPI
Jessica Floyd is a 73 year old female with complex medical history including HTN, HLD, HFwpEF, CAD, emphysema, GI bleed, and recent prolonged hospital admission for incarcerated inguinal hernia requiring exploratory laparotomy for ischemic bowel. She has been recovering quite well over the past few weeks but yesterday began to experience increasing abdominal pain and distention, nausea, and emesis. She does not note any inciting factors. Has not had any similar episodes since her last hospitalization. On evaluation in the emergency room a CT scan of the abdomen and pelvis was performed revealing dilated small bowel loops with transition point in the mid-abdomen. NG tube placement was attempted, but failed due to coiling. Labs on admission revealed leukocytosis to 19, and a decreased GFR of 53. General surgery has been consulted for evaluation. Skin integrity PALMER consulted for evaluation of skin injury.

## 2024-07-10 NOTE — PLAN OF CARE
[facility]  Discharge Final Note    Primary Care Provider: Effie Olmedo MD  Expected Discharge Date: 7/9/2024    Patient medically ready for discharge to home.     Transportation by family.     Is family/patient aware of discharge: yes     Hospital follow up scheduled: yes       Final Discharge Note (most recent)       Final Note - 07/10/24 1213          Final Note    Assessment Type Final Discharge Note     Anticipated Discharge Disposition Home or Self Care     Hospital Resources/Appts/Education Provided Provided patient/caregiver with written discharge plan information                     Important Message from Medicare  Important Message from Medicare regarding Discharge Appeal Rights: Given to patient/caregiver, Explained to patient/caregiver, Signed/date by patient/caregiver   Time IMM was signed: 0942  Referral Info (most recent)       Referral Info    No documentation.                 Contact Info       Effie Olmedo MD   Specialty: Family Medicine   Relationship: PCP - General    [address] [place] [address]   Phone: [phone]       Next Steps: Follow up in 2 week(s)    Instructions: Hospital follow up          Future Appointments   Date Time Provider Department Center   7/16/2024 10:15 AM LAB, SAME DAY [facility] LABDRAW [facility]   7/17/2024 10:40 AM Jace Valencia MD [facility] IM [facility] PCW   7/23/2024  8:00 AM Silva Arce, NP [address]   7/23/2024 10:30 AM Agata Rolon MD [facility] HEM ONC [facility]   8/6/2024  2:30 PM Post, Danis RUBIO MD [facility] PSYCH [facility]   8/13/2024 10:00 AM Effie Olmedo [facility]   8/20/2024 10:00 AM PULMONARY FUNCTION [facility] PULMLAB [facility]   8/20/2024 10:15 AM PULMONARY FUNCTION [facility] PULMLAB [facility]   8/20/2024 10:30 AM Hina Roberts DO [facility] LUNGTX [facility]   9/10/2024 10:00 AM Michael Lal MD [facility] [id number] [facility]   9/16/2024 10:30 AM Armani Cai, PA-C [facility]     Rafael Farah,  RN  Case Management  Ext: [phone]  07/10/2024

## 2024-07-11 NOTE — PLAN OF CARE
[facility]      HOME HEALTH ORDERS  FACE TO FACE ENCOUNTER    Patient Name: Jessica Floyd  YOB: 1950    PCP: Effie Olmedo MD   PCP Address: [address] [place], [place] [id number]  PCP Phone Number: [phone]  PCP Fax: [phone]    Encounter Date: 7/4/24    Admit to Home Health    Diagnoses:  Active Hospital Problems    Diagnosis  POA    Skin ulcer of sacrum, with unspecified severity [L98.429]  Yes      Resolved Hospital Problems    Diagnosis Date Resolved POA    *Bowel obstruction [K56.609] 07/09/2024 Yes       Follow Up Appointments:  Future Appointments   Date Time Provider Department Center   7/16/2024 10:15 AM LAB, SAME DAY [facility] LABDRAW [facility]   7/17/2024 10:40 AM Jace Valencia MD [facility] IM [facility] PCW   7/23/2024  8:00 AM Silva Arce, NP [address]   7/23/2024 10:30 AM Agata Rolon MD [facility] HEM ONC [facility]   8/13/2024 10:00 AM Effie Olmedo MD [facility] PRICARE [place]   8/20/2024 10:00 AM PULMONARY FUNCTION [facility] PULMLAB [facility]   8/20/2024 10:15 AM PULMONARY FUNCTION [facility] PULMLAB [facility]   8/20/2024 10:30 AM Hina Roberts DO [facility] LUNGTX [facility]   9/10/2024 10:00 AM Michael Lal MD [facility] [id number] [facility]   9/16/2024 10:30 AM Armani Cai PA-C [facility]       Allergies:  Review of patient's allergies indicates:   Allergen Reactions    Hydromorphone Anxiety, Other (See Comments) and Hallucinations     Severe agitation       Medications: Review discharge medications with patient and family and provide education.    Continue all current medications    I have seen and examined this patient within the last 30 days. My clinical findings that support the need for the home health skilled services and home bound status are the following:no              Weakness/numbness causing balance and gait disturbance due to Weakness/Debility and Surgery making it taxing to leave home.      Diet:   regular diet         Referrals/ Consults  Physical Therapy to evaluate and treat. Evaluate for home safety and equipment needs; Establish/upgrade home exercise program. Perform / instruct on therapeutic exercises, gait training, transfer training, and Range of Motion.  Occupational Therapy to evaluate and treat. Evaluate home environment for safety and equipment needs. Perform/Instruct on transfers, ADL training, ROM, and therapeutic exercises.        Nursing:   Agency to admit patient within 24 hours of hospital discharge unless specified on physician order or at patient request     SN to complete comprehensive assessment including routine vital signs. Instruct on disease process and s/s of complications to report to MD. Review/verify medication list sent home with the patient at time of discharge  and instruct patient/caregiver as needed. Frequency may be adjusted depending on start of care date.      Skilled nurse to perform up to 3 visits PRN for symptoms related to diagnosis     Notify MD if SBP > 160 or < 90; DBP > 90 or < 50; HR > 120 or < 50; Temp > 101; O2 < 88%     Ok to schedule additional visits based on staff availability and patient request on consecutive days within the home health episode.     When multiple disciplines ordered:     Start of Care occurs on Sunday - Wednesday schedule remaining discipline evaluations as ordered on separate consecutive days following the start of care.     Thursday SOC -schedule subsequent evaluations Friday and Monday the following week.      Friday - Saturday SOC - schedule subsequent discipline evaluations on consecutive days starting Monday of the following week.        Home Health Aide:  Nursing Three times weekly and Physical Therapy Three times weekly    Wound Care Orders  yes:  Surgical Wound:  Location: Abdomen, Leg     Left leg wound:   Consult ET nurse, Leg        Apply the following to wound:  Cleanse wound with dial soap, pat dry. Apply Mepilex dressing to wound and instruct  patient to change dressing daily.  Keep clean and dry. Monitor for skin changes and signs of infection including but not limited to erythema, drainage, increased warmth.       I certify that this patient is confined to her home and needs intermittent skilled nursing care and physical therapy.

## 2024-07-16 ENCOUNTER — LAB VISIT (OUTPATIENT)
Dept: LAB | Facility: OTHER | Age: 74
End: 2024-07-16
Attending: INTERNAL MEDICINE
Payer: MEDICARE

## 2024-07-16 ENCOUNTER — PATIENT MESSAGE (OUTPATIENT)
Dept: PRIMARY CARE CLINIC | Facility: CLINIC | Age: 74
End: 2024-07-16
Payer: MEDICARE

## 2024-07-16 DIAGNOSIS — D50.8 OTHER IRON DEFICIENCY ANEMIA: ICD-10-CM

## 2024-07-16 LAB
ALBUMIN SERPL BCP-MCNC: 3.1 G/DL (ref 3.5–5.2)
ALP SERPL-CCNC: 77 U/L (ref 55–135)
ALT SERPL W/O P-5'-P-CCNC: 16 U/L (ref 10–44)
ANION GAP SERPL CALC-SCNC: 9 MMOL/L (ref 8–16)
ANISOCYTOSIS BLD QL SMEAR: SLIGHT
AST SERPL-CCNC: 20 U/L (ref 10–40)
BASOPHILS # BLD AUTO: 0.25 K/UL (ref 0–0.2)
BASOPHILS NFR BLD: 1.9 % (ref 0–1.9)
BILIRUB SERPL-MCNC: 0.6 MG/DL (ref 0.1–1)
BUN SERPL-MCNC: 28 MG/DL (ref 8–23)
CALCIUM SERPL-MCNC: 8.6 MG/DL (ref 8.7–10.5)
CHLORIDE SERPL-SCNC: 97 MMOL/L (ref 95–110)
CO2 SERPL-SCNC: 28 MMOL/L (ref 23–29)
CREAT SERPL-MCNC: 1.3 MG/DL (ref 0.5–1.4)
DACRYOCYTES BLD QL SMEAR: ABNORMAL
DIFFERENTIAL METHOD BLD: ABNORMAL
EOSINOPHIL # BLD AUTO: 0.2 K/UL (ref 0–0.5)
EOSINOPHIL NFR BLD: 1.8 % (ref 0–8)
ERYTHROCYTE [DISTWIDTH] IN BLOOD BY AUTOMATED COUNT: 22.1 % (ref 11.5–14.5)
EST. GFR  (NO RACE VARIABLE): 43 ML/MIN/1.73 M^2
FERRITIN SERPL-MCNC: 135 NG/ML (ref 20–300)
GLUCOSE SERPL-MCNC: 114 MG/DL (ref 70–110)
HCT VFR BLD AUTO: 31.5 % (ref 37–48.5)
HGB BLD-MCNC: 9.8 G/DL (ref 12–16)
HYPOCHROMIA BLD QL SMEAR: ABNORMAL
IMM GRANULOCYTES # BLD AUTO: 0.29 K/UL (ref 0–0.04)
IMM GRANULOCYTES NFR BLD AUTO: 2.2 % (ref 0–0.5)
IRON SERPL-MCNC: 12 UG/DL (ref 30–160)
LYMPHOCYTES # BLD AUTO: 1.5 K/UL (ref 1–4.8)
LYMPHOCYTES NFR BLD: 11.3 % (ref 18–48)
MCH RBC QN AUTO: 31 PG (ref 27–31)
MCHC RBC AUTO-ENTMCNC: 31.1 G/DL (ref 32–36)
MCV RBC AUTO: 100 FL (ref 82–98)
MONOCYTES # BLD AUTO: 3.1 K/UL (ref 0.3–1)
MONOCYTES NFR BLD: 23.5 % (ref 4–15)
NEUTROPHILS # BLD AUTO: 7.7 K/UL (ref 1.8–7.7)
NEUTROPHILS NFR BLD: 59.3 % (ref 38–73)
NRBC BLD-RTO: 0 /100 WBC
OVALOCYTES BLD QL SMEAR: ABNORMAL
PLATELET # BLD AUTO: 302 K/UL (ref 150–450)
PLATELET BLD QL SMEAR: ABNORMAL
PMV BLD AUTO: 9.9 FL (ref 9.2–12.9)
POIKILOCYTOSIS BLD QL SMEAR: SLIGHT
POTASSIUM SERPL-SCNC: 3.8 MMOL/L (ref 3.5–5.1)
PROT SERPL-MCNC: 7.1 G/DL (ref 6–8.4)
RBC # BLD AUTO: 3.16 M/UL (ref 4–5.4)
SATURATED IRON: 4 % (ref 20–50)
SODIUM SERPL-SCNC: 134 MMOL/L (ref 136–145)
TOTAL IRON BINDING CAPACITY: 332 UG/DL (ref 250–450)
TOXIC GRANULES BLD QL SMEAR: PRESENT
TRANSFERRIN SERPL-MCNC: 224 MG/DL (ref 200–375)
VIT B12 SERPL-MCNC: 1830 PG/ML (ref 210–950)
WBC # BLD AUTO: 13 K/UL (ref 3.9–12.7)

## 2024-07-16 PROCEDURE — 86334 IMMUNOFIX E-PHORESIS SERUM: CPT | Mod: TXP | Performed by: INTERNAL MEDICINE

## 2024-07-16 PROCEDURE — 84630 ASSAY OF ZINC: CPT | Mod: TXP | Performed by: INTERNAL MEDICINE

## 2024-07-16 PROCEDURE — 86334 IMMUNOFIX E-PHORESIS SERUM: CPT | Mod: 26,NTX,, | Performed by: PATHOLOGY

## 2024-07-16 PROCEDURE — 82525 ASSAY OF COPPER: CPT | Mod: NTX | Performed by: INTERNAL MEDICINE

## 2024-07-16 PROCEDURE — 84238 ASSAY NONENDOCRINE RECEPTOR: CPT | Mod: NTX | Performed by: INTERNAL MEDICINE

## 2024-07-16 PROCEDURE — 82728 ASSAY OF FERRITIN: CPT | Mod: TXP | Performed by: INTERNAL MEDICINE

## 2024-07-16 PROCEDURE — 85025 COMPLETE CBC W/AUTO DIFF WBC: CPT | Mod: TXP | Performed by: INTERNAL MEDICINE

## 2024-07-16 PROCEDURE — 84165 PROTEIN E-PHORESIS SERUM: CPT | Mod: NTX | Performed by: INTERNAL MEDICINE

## 2024-07-16 PROCEDURE — 83540 ASSAY OF IRON: CPT | Mod: TXP | Performed by: INTERNAL MEDICINE

## 2024-07-16 PROCEDURE — 84165 PROTEIN E-PHORESIS SERUM: CPT | Mod: 26,NTX,, | Performed by: PATHOLOGY

## 2024-07-16 PROCEDURE — 83521 IG LIGHT CHAINS FREE EACH: CPT | Mod: NTX | Performed by: INTERNAL MEDICINE

## 2024-07-16 PROCEDURE — 80053 COMPREHEN METABOLIC PANEL: CPT | Mod: NTX | Performed by: INTERNAL MEDICINE

## 2024-07-16 PROCEDURE — 83921 ORGANIC ACID SINGLE QUANT: CPT | Mod: NTX | Performed by: INTERNAL MEDICINE

## 2024-07-16 PROCEDURE — 82607 VITAMIN B-12: CPT | Mod: TXP | Performed by: INTERNAL MEDICINE

## 2024-07-16 RX ORDER — HYDROCHLOROTHIAZIDE 12.5 MG/1
12.5 CAPSULE ORAL DAILY
Qty: 90 CAPSULE | Refills: 1
Start: 2024-07-16 | End: 2025-07-16

## 2024-07-16 NOTE — TELEPHONE ENCOUNTER
No care due was identified.  [facility] Embedded Care Due Messages. Reference number: [id number].   7/16/2024 1:13:37 PM CDT

## 2024-07-16 NOTE — TELEPHONE ENCOUNTER
Refill Encounter    PCP Visits: Recent Visits  Date Type Provider Dept   11/27/23 Office Visit Effie Olmedo MD [facility]   Showing recent visits within past 360 days and meeting all other requirements  Future Appointments  Date Type Provider Dept   08/13/24 Appointment Effie Olmedo MD [facility]   Showing future appointments within next 720 days and meeting all other requirements     Last 3 Blood Pressure:   BP Readings from Last 3 Encounters:   07/09/24 114/66   06/25/24 122/69   06/19/24 112/62     Preferred Pharmacy:   [facility] #[id number] - [place], [place] - [address] [facility]  [address] [place] [id number]  Phone: [phone] Fax: [phone]      Requested RX:  Requested Prescriptions     Pending Prescriptions Disp Refills    hydroCHLOROthiazide (MICROZIDE) 12.5 mg capsule       Sig: Take 1 capsule (12.5 mg total) by mouth once daily.      RX Route: Normal

## 2024-07-17 ENCOUNTER — OFFICE VISIT (OUTPATIENT)
Dept: INTERNAL MEDICINE | Facility: CLINIC | Age: 74
End: 2024-07-17
Payer: MEDICARE

## 2024-07-17 VITALS
HEIGHT: 63 IN | WEIGHT: 112.44 LBS | OXYGEN SATURATION: 96 % | DIASTOLIC BLOOD PRESSURE: 66 MMHG | HEART RATE: 75 BPM | SYSTOLIC BLOOD PRESSURE: 118 MMHG | BODY MASS INDEX: 19.92 KG/M2

## 2024-07-17 DIAGNOSIS — E53.8 B12 DEFICIENCY: ICD-10-CM

## 2024-07-17 DIAGNOSIS — J43.2 CENTRILOBULAR EMPHYSEMA: ICD-10-CM

## 2024-07-17 DIAGNOSIS — Z87.19 HX SBO: ICD-10-CM

## 2024-07-17 DIAGNOSIS — F41.9 SEVERE ANXIETY: ICD-10-CM

## 2024-07-17 DIAGNOSIS — N18.31 STAGE 3A CHRONIC KIDNEY DISEASE: ICD-10-CM

## 2024-07-17 DIAGNOSIS — I10 ESSENTIAL HYPERTENSION: ICD-10-CM

## 2024-07-17 DIAGNOSIS — K21.9 GASTROESOPHAGEAL REFLUX DISEASE WITHOUT ESOPHAGITIS: ICD-10-CM

## 2024-07-17 DIAGNOSIS — E78.5 DYSLIPIDEMIA: ICD-10-CM

## 2024-07-17 DIAGNOSIS — K57.90 DIVERTICULOSIS: ICD-10-CM

## 2024-07-17 DIAGNOSIS — I60.9 SAH (SUBARACHNOID HEMORRHAGE): ICD-10-CM

## 2024-07-17 DIAGNOSIS — Z76.89 ENCOUNTER TO ESTABLISH CARE: Primary | ICD-10-CM

## 2024-07-17 DIAGNOSIS — I25.10 CORONARY ARTERY DISEASE INVOLVING NATIVE CORONARY ARTERY OF NATIVE HEART WITHOUT ANGINA PECTORIS: ICD-10-CM

## 2024-07-17 LAB
ALBUMIN SERPL ELPH-MCNC: 3.32 G/DL (ref 3.35–5.55)
ALPHA1 GLOB SERPL ELPH-MCNC: 0.44 G/DL (ref 0.17–0.41)
ALPHA2 GLOB SERPL ELPH-MCNC: 0.74 G/DL (ref 0.43–0.99)
B-GLOBULIN SERPL ELPH-MCNC: 0.74 G/DL (ref 0.5–1.1)
GAMMA GLOB SERPL ELPH-MCNC: 1.45 G/DL (ref 0.67–1.58)
INTERPRETATION SERPL IFE-IMP: NORMAL
KAPPA LC SER QL IA: 9.68 MG/DL (ref 0.33–1.94)
KAPPA LC/LAMBDA SER IA: 0.92 (ref 0.26–1.65)
LAMBDA LC SER QL IA: 10.57 MG/DL (ref 0.57–2.63)
PATHOLOGIST INTERPRETATION IFE: NORMAL
PATHOLOGIST INTERPRETATION SPE: NORMAL
PROT SERPL-MCNC: 6.7 G/DL (ref 6–8.4)
STFR SERPL-MCNC: 7.4 MG/L (ref 1.8–4.6)

## 2024-07-17 PROCEDURE — 3074F SYST BP LT 130 MM HG: CPT | Mod: CPTII,S$GLB,, | Performed by: INTERNAL MEDICINE

## 2024-07-17 PROCEDURE — 1125F AMNT PAIN NOTED PAIN PRSNT: CPT | Mod: CPTII,S$GLB,, | Performed by: INTERNAL MEDICINE

## 2024-07-17 PROCEDURE — 99214 OFFICE O/P EST MOD 30 MIN: CPT | Mod: S$GLB,,, | Performed by: INTERNAL MEDICINE

## 2024-07-17 PROCEDURE — 3008F BODY MASS INDEX DOCD: CPT | Mod: CPTII,S$GLB,, | Performed by: INTERNAL MEDICINE

## 2024-07-17 PROCEDURE — 1101F PT FALLS ASSESS-DOCD LE1/YR: CPT | Mod: CPTII,S$GLB,, | Performed by: INTERNAL MEDICINE

## 2024-07-17 PROCEDURE — 3078F DIAST BP <80 MM HG: CPT | Mod: CPTII,S$GLB,, | Performed by: INTERNAL MEDICINE

## 2024-07-17 PROCEDURE — 99999 PR PBB SHADOW E&M-EST. PATIENT-LVL IV: CPT | Mod: PBBFAC,,, | Performed by: INTERNAL MEDICINE

## 2024-07-17 PROCEDURE — 1111F DSCHRG MED/CURRENT MED MERGE: CPT | Mod: CPTII,S$GLB,, | Performed by: INTERNAL MEDICINE

## 2024-07-17 PROCEDURE — 3288F FALL RISK ASSESSMENT DOCD: CPT | Mod: CPTII,S$GLB,, | Performed by: INTERNAL MEDICINE

## 2024-07-17 PROCEDURE — 1159F MED LIST DOCD IN RCRD: CPT | Mod: CPTII,S$GLB,, | Performed by: INTERNAL MEDICINE

## 2024-07-17 NOTE — PROGRESS NOTES
Subjective:       Patient ID: Jessica Floyd is a 73 y.o. female.    Chief Complaint: Establish Care      HPI  Jessica Floyd is a 73 y.o. year old female with CAD, HTN, HLD, emphysema, hx of SAH (ruptured aneurysm, 1999, s/p clipping), carotid stenosis, hx of incarcerated here s/p bowel resection, recent SBO presents for establishment of care. Accompanied by . Requesting transfer of care. Doing well since last hospital discharge. Is scheduled to see heme/onc next week. Requesting refill of syringes for her B12 injections. In wheel chair today, uses walker with wheels and rollator at home.     Review of Systems   Constitutional:  Negative for chills and fatigue.   Respiratory:  Negative for chest tightness and shortness of breath.    Cardiovascular:  Negative for chest pain.   Gastrointestinal:  Negative for abdominal pain, blood in stool, constipation, diarrhea, nausea and vomiting.        Last BM today   Genitourinary:  Negative for dysuria.   Musculoskeletal:  Negative for arthralgias and myalgias.   Neurological:  Positive for weakness. Negative for headaches.   Psychiatric/Behavioral:  Negative for dysphoric mood.          Past Medical History:   Diagnosis Date    Allergic rhinitis     Amblyopia     Carotid stenosis     Coronary atherosclerosis     Outside [facility] 6/2014: 20% mLAD, 50% mCx, 20%, mRCA    Dyslipidemia     ESBL (extended spectrum beta-lactamase) producing bacteria infection     UTI    Hypertension     Nausea and vomiting 05/26/2017    Pulmonary emphysema 10/28/2023    SAH (subarachnoid hemorrhage) 08/24/2016 1999, s/p clipping    Subarachnoid hemorrhage due to ruptured aneurysm 1999        Prior to Admission medications    Medication Sig Start Date End Date Taking? Authorizing Provider   acetaminophen (TYLENOL) 500 MG tablet Take 2 tablets (1,000 mg total) by mouth every 8 (eight) hours as needed for Pain. 7/9/24 7/19/24 Yes Jose Armando Knight, KEYSHA   allopurinoL (ZYLOPRIM) 100 MG tablet Take 2  tablets (200 mg total) by mouth once daily. 7/9/24 10/7/24 Yes Jose Armando Knight PA-C   amLODIPine (NORVASC) 5 MG tablet Take 1 tablet (5 mg total) by mouth once daily. 7/9/24 7/9/25 Yes Jose Armando Knight PA-C   atorvastatin (LIPITOR) 80 MG tablet Take 1 tablet (80 mg total) by mouth once daily. 7/9/24 7/9/25 Yes Jose Armando Knight PA-C   celecoxib (CELEBREX) 200 MG capsule Take 1 capsule (200 mg total) by mouth daily as needed for Pain. 7/9/24 8/8/24 Yes Jose Armando Knight PA-C   colchicine (COLCRYS) 0.6 mg tablet Take 1 tablet (0.6 mg total) by mouth once daily. 7/9/24 4/5/25 Yes Jose Armando Knight PA-C   fluticasone furoate-vilanteroL (BREO ELLIPTA) 100-25 mcg/dose diskus inhaler Inhale 1 puff into the lungs once daily. Controller 7/9/24 7/9/25 Yes Jose Armando Knight PA-C   fluticasone propion-salmeterol 115-21 mcg/dose (ADVAIR HFA) 115-21 mcg/actuation HFAA inhaler Inhale 2 puffs into the lungs every 12 (twelve) hours. Controller 7/9/24 7/9/25 Yes Jose Armando Knight PA-C   fluticasone propionate (FLONASE) 50 mcg/actuation nasal spray 2 sprays (100 mcg total) by Each Nostril route once daily. 7/9/24  Yes Jose Armando Knight PA-C   furosemide (LASIX) 20 MG tablet Take 1 tablet (20 mg total) by mouth once daily. 7/9/24 10/7/24 Yes Jose Armando Knight PA-C   hydroCHLOROthiazide (MICROZIDE) 12.5 mg capsule Take 1 capsule (12.5 mg total) by mouth once daily. 7/16/24 7/16/25 Yes Effie Olmedo MD   LIDOcaine 4 % Gel Apply 1 g topically 2 (two) times daily as needed (foot pain). 7/9/24  Yes Jose Armando Knight PA-C   LIDOcaine-prilocaine (EMLA) cream Apply topically as needed (Apply topically as needed). 7/9/24 8/8/24 Yes Jose Armando Knight PA-C   melatonin (MELATIN) 3 mg tablet Take 2 tablets (6 mg total) by mouth nightly as needed for Insomnia. 7/9/24 8/8/24 Yes Jose Armando Knight PA-C   montelukast (SINGULAIR) 10 mg tablet Take 1 tablet (10 mg total) by mouth every evening. 7/9/24 2/4/25 Yes Eugene  "Jose Armando VALDOVINOS PA-C   pantoprazole (PROTONIX) 40 MG tablet Take 1 tablet (40 mg total) by mouth 2 (two) times daily. 7/9/24 7/9/25 Yes Jose Armando Knight PA-C   polyethylene glycol (GLYCOLAX) 17 gram PwPk Take 17 g by mouth daily as needed for Constipation. 7/9/24  Yes Jose Armando Knight PA-C   QUEtiapine (SEROQUEL) 50 MG tablet Take 1 tablet (50 mg total) by mouth every evening. 7/9/24 1/5/25 Yes Jose Armando Knight PA-C   tiotropium bromide (SPIRIVA RESPIMAT) 2.5 mcg/actuation inhaler Inhale 2 puffs into the lungs Daily. Controller 7/9/24 8/8/24 Yes Jose Armando Knight PA-C   sodium chloride-aloe vera Gel 1 Application by Nasal route daily as needed (Apply to each nostril daily for dryness).  Patient not taking: Reported on 7/17/2024 7/9/24   Jose Armando Knight PA-C        Past medical history, surgical history, and family medical history reviewed and updated as appropriate.    Medications and allergies reviewed.     Objective:          Vitals:    07/17/24 1049   BP: 118/66   BP Location: Right arm   Patient Position: Sitting   Pulse: 75   SpO2: 96%   Weight: 51 kg (112 lb 7 oz)   Height: 5' 3" (1.6 m)     Body mass index is 19.92 kg/m².  Physical Exam  Constitutional:       Appearance: Normal appearance.      Comments: Thin appearing  In good spirits   HENT:      Head: Normocephalic and atraumatic.      Comments: Temporal wasting     Nose: Nose normal.      Mouth/Throat:      Mouth: Mucous membranes are moist.      Pharynx: Oropharynx is clear.   Eyes:      Extraocular Movements: Extraocular movements intact.   Cardiovascular:      Rate and Rhythm: Normal rate and regular rhythm.   Pulmonary:      Effort: Pulmonary effort is normal.      Breath sounds: Normal breath sounds.   Abdominal:      General: Bowel sounds are normal. There is no distension.      Palpations: There is no mass.      Tenderness: There is no abdominal tenderness. There is no right CVA tenderness, left CVA tenderness, guarding or rebound. "   Musculoskeletal:         General: Normal range of motion.   Skin:     General: Skin is warm and dry.      Capillary Refill: Capillary refill takes less than 2 seconds.   Neurological:      General: No focal deficit present.      Mental Status: She is alert and oriented to person, place, and time.   Psychiatric:         Mood and Affect: Mood normal.         Thought Content: Thought content normal.         Lab Results   Component Value Date    WBC 13.00 (H) 07/16/2024    HGB 9.8 (L) 07/16/2024    HCT 31.5 (L) 07/16/2024     07/16/2024    CHOL 176 04/05/2022    TRIG 88 04/05/2022    HDL 69 04/05/2022    ALT 16 07/16/2024    AST 20 07/16/2024     (L) 07/16/2024    K 3.8 07/16/2024    CL 97 07/16/2024    CREATININE 1.3 07/16/2024    BUN 28 (H) 07/16/2024    CO2 28 07/16/2024    TSH 0.719 10/28/2023    INR 1.0 07/04/2024    HGBA1C 5.2 11/18/2023       Assessment:       1. Encounter to establish care    2. Hx SBO    3. Severe anxiety    4. Coronary artery disease involving native coronary artery of native heart without angina pectoris    5. Centrilobular emphysema    6. Essential hypertension    7. Gastroesophageal reflux disease without esophagitis    8. Dyslipidemia    9. Stage 3a chronic kidney disease    10. B12 deficiency    11. SAH (subarachnoid hemorrhage)    12. Diverticulosis          Plan:     Jessica was seen today for establish care.    Diagnoses and all orders for this visit:    Encounter to establish care    Hx SBO  Comments:  recent admission, NGT decompression with relief of symptoms, back to baseline health    Severe anxiety    Coronary artery disease involving native coronary artery of native heart without angina pectoris    Centrilobular emphysema    Essential hypertension    Gastroesophageal reflux disease without esophagitis    Dyslipidemia    Stage 3a chronic kidney disease    B12 deficiency  -     syringe, disposable, 1 mL Syrg; 1 Stick by Misc.(Non-Drug; Combo Route) route once a  week.    SAH (subarachnoid hemorrhage)  Comments:  1999, ruptured aneurysm    Diverticulosis  Comments:  seen on last c-scope      Health maintenance reviewed with patient.    Follow up in about 3 months (around 10/17/2024).    Jace Valencia MD  Internal Medicine / Primary Care  [facility]  7/17/2024

## 2024-07-17 NOTE — PATIENT INSTRUCTIONS
Return to clinic in 3 months or sooner if needed.     Follow up with hematology / oncology as scheduled.

## 2024-07-19 LAB — ZINC SERPL-MCNC: 40 UG/DL (ref 60–130)

## 2024-07-21 LAB — METHYLMALONATE SERPL-SCNC: 0.46 UMOL/L

## 2024-07-22 ENCOUNTER — TELEPHONE (OUTPATIENT)
Dept: HOME HEALTH SERVICES | Facility: CLINIC | Age: 74
End: 2024-07-22
Payer: MEDICARE

## 2024-07-22 LAB — COPPER SERPL-MCNC: 1445 UG/L (ref 810–1990)

## 2024-07-23 ENCOUNTER — OFFICE VISIT (OUTPATIENT)
Dept: HEMATOLOGY/ONCOLOGY | Facility: CLINIC | Age: 74
End: 2024-07-23
Payer: MEDICARE

## 2024-07-23 ENCOUNTER — INFUSION (OUTPATIENT)
Dept: INFUSION THERAPY | Facility: OTHER | Age: 74
End: 2024-07-23
Attending: FAMILY MEDICINE
Payer: MEDICARE

## 2024-07-23 VITALS
BODY MASS INDEX: 18.87 KG/M2 | SYSTOLIC BLOOD PRESSURE: 106 MMHG | HEIGHT: 63 IN | TEMPERATURE: 98 F | DIASTOLIC BLOOD PRESSURE: 69 MMHG | HEART RATE: 78 BPM | WEIGHT: 106.5 LBS | OXYGEN SATURATION: 95 %

## 2024-07-23 DIAGNOSIS — D53.9 MACROCYTIC ANEMIA: Primary | ICD-10-CM

## 2024-07-23 DIAGNOSIS — D50.0 IRON DEFICIENCY ANEMIA SECONDARY TO BLOOD LOSS (CHRONIC): Primary | ICD-10-CM

## 2024-07-23 PROCEDURE — 25000003 PHARM REV CODE 250: Performed by: INTERNAL MEDICINE

## 2024-07-23 PROCEDURE — 1111F DSCHRG MED/CURRENT MED MERGE: CPT | Mod: CPTII,S$GLB,, | Performed by: INTERNAL MEDICINE

## 2024-07-23 PROCEDURE — 3078F DIAST BP <80 MM HG: CPT | Mod: CPTII,S$GLB,, | Performed by: INTERNAL MEDICINE

## 2024-07-23 PROCEDURE — 3074F SYST BP LT 130 MM HG: CPT | Mod: CPTII,S$GLB,, | Performed by: INTERNAL MEDICINE

## 2024-07-23 PROCEDURE — 96365 THER/PROPH/DIAG IV INF INIT: CPT

## 2024-07-23 PROCEDURE — 1125F AMNT PAIN NOTED PAIN PRSNT: CPT | Mod: CPTII,S$GLB,, | Performed by: INTERNAL MEDICINE

## 2024-07-23 PROCEDURE — 3008F BODY MASS INDEX DOCD: CPT | Mod: CPTII,S$GLB,, | Performed by: INTERNAL MEDICINE

## 2024-07-23 PROCEDURE — 63600175 PHARM REV CODE 636 W HCPCS: Performed by: INTERNAL MEDICINE

## 2024-07-23 PROCEDURE — 99215 OFFICE O/P EST HI 40 MIN: CPT | Mod: S$GLB,,, | Performed by: INTERNAL MEDICINE

## 2024-07-23 PROCEDURE — 99999 PR PBB SHADOW E&M-EST. PATIENT-LVL IV: CPT | Mod: PBBFAC,,, | Performed by: INTERNAL MEDICINE

## 2024-07-23 PROCEDURE — 1101F PT FALLS ASSESS-DOCD LE1/YR: CPT | Mod: CPTII,S$GLB,, | Performed by: INTERNAL MEDICINE

## 2024-07-23 PROCEDURE — 1159F MED LIST DOCD IN RCRD: CPT | Mod: CPTII,S$GLB,, | Performed by: INTERNAL MEDICINE

## 2024-07-23 PROCEDURE — 3288F FALL RISK ASSESSMENT DOCD: CPT | Mod: CPTII,S$GLB,, | Performed by: INTERNAL MEDICINE

## 2024-07-23 RX ORDER — METHYLPREDNISOLONE SOD SUCC 125 MG
125 VIAL (EA) INJECTION ONCE AS NEEDED
Status: DISCONTINUED | OUTPATIENT
Start: 2024-07-23 | End: 2024-07-23 | Stop reason: HOSPADM

## 2024-07-23 RX ORDER — SODIUM CHLORIDE 0.9 % (FLUSH) 0.9 %
10 SYRINGE (ML) INJECTION
Status: DISCONTINUED | OUTPATIENT
Start: 2024-07-23 | End: 2024-07-23 | Stop reason: HOSPADM

## 2024-07-23 RX ORDER — DIPHENHYDRAMINE HYDROCHLORIDE 50 MG/ML
50 INJECTION INTRAMUSCULAR; INTRAVENOUS ONCE AS NEEDED
Status: DISCONTINUED | OUTPATIENT
Start: 2024-07-23 | End: 2024-07-23 | Stop reason: HOSPADM

## 2024-07-23 RX ORDER — HEPARIN 100 UNIT/ML
500 SYRINGE INTRAVENOUS
Status: DISCONTINUED | OUTPATIENT
Start: 2024-07-23 | End: 2024-07-23 | Stop reason: HOSPADM

## 2024-07-23 RX ORDER — EPINEPHRINE 0.3 MG/.3ML
0.3 INJECTION SUBCUTANEOUS ONCE AS NEEDED
Status: DISCONTINUED | OUTPATIENT
Start: 2024-07-23 | End: 2024-07-23 | Stop reason: HOSPADM

## 2024-07-23 RX ADMIN — SODIUM CHLORIDE: 9 INJECTION, SOLUTION INTRAVENOUS at 11:07

## 2024-07-23 RX ADMIN — IRON SUCROSE 300 MG: 20 INJECTION, SOLUTION INTRAVENOUS at 11:07

## 2024-07-23 NOTE — Clinical Note
Kishan Norman I saw Mrs. Floyd back in heme clinic today. Since our last visit, she had a prolonged hospitalization for a GI bleeding. It looks like the team caring for her thought the bleeding was from ischemic bowel from an incarcerated hernia. Initial CTA showed cecal bleeding requiring embolization.  She's still iron deficient but improving. I ask her to contact you to set up a return visit but also figured I'd message you as well. Thanks!

## 2024-07-23 NOTE — Clinical Note
Kishan Valencia, I met w/ Mrs. Floyd in heme clinic today, she remains a bit iron deficient after her hospitalization for GI bleed so going to give her more IV iron. She has a few other reasons for anemia so will watch her counts  Regarding cancer screening, I noticed some incidental pulm nodules on her CT in May 2024, she is a former light smoker, so wanted to make sure that's on your radar - it looks like she's seeing pulm in August for her copd. I would do a CT chest noncontrast in 3 months. She is also due for her screening mammogram. I didn't get the chance to talk about this w/ her - just noticed as I was finishing up on my note.   Thanks! -ellyn

## 2024-07-23 NOTE — PROGRESS NOTES
[facility]     Outpatient Hematology/Medical Oncology Note  Patient: Jessica Floyd  MRN: [id number]  Primary Care Provider: Effie Olmedo MD  Chief Complaint: Macrocytic Anemia     Subjective     Subjective:   HPI:   Jessica Floyd is a 73 y.o. 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 She presents today for follow up.     HPI:  Feb 2023: presented to the ED with significant epistaxis  April 2023 :ENT - diagnosed with chronic maxillary sinusitis and nasal polyposis  March 2023: first appointment with Hematology for new onset ERIK of unclear cause   Nov and Dec 2023: admissions for COVID and influenza with subsequent COPD exacerbations   5/14 - 6/3/2024: presented with acute GI bleed   5/14/24: CTA: acute bleed at the level of the cecum - incidental findings: severe plaque burden at bilateral common iliac artery and bilateral common femoral artery   5/15/24: EGD: 2 cm hiatal hernia, otherwise unremarkable; 5/16/24: Colonoscopy: poor prep - clotted blood in entire colon; unable to visualize colon  5/14/24: 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  5/17/24: Given continued pain after the embolization, she underwent exploratory laparotomy with findings of necrotic small bowel in incarcerated left inguinal hernia s/p small bowel resection and tissue repair of inguinal hernias (through the same segment of bowel  that had coils in the vasculature)    Hematology History:  10/2022: first noted to be anemic - Hgb Range: 8.5 - 12 range, MCV: 105, WBC and platelets: wnl (WBC elevated in setting of recent viral illnesses, anemia nadiring after L THR and hospitalizations for COVID, flu, CAP, ER visits for significant epistaxis)  Treatment: IV Iron - (venofer): May 2023 (, 5/15, , , ), 2023 (, , 10/6), May 2024 (5/10/24)  Labs: 2024: WBC: 14, Hgb: 10.3, MCV: 110, platelets: 387, WBC: 14 (ANC: 8500), TSAT: 10%, Ferritin: 24 (2023) to 49 (2023), 611 (2023), now 69,  folate : 14, B12: 586, MMA: 1.72, retic: 7.6, smear: unremarkable (moderate anisopoikilocytosis, leukocytosis w/ reactive changes), Cr: 0.8, GFR: 60, LFTs: wnl, KF.5, LFLC: 3.99, Ratio: wnl, HIV and hepatitis testing: negative, haptoglobin: 264 (2023), retic: 4 (2023)   2024 Labs: CBC: WBC: 13, Hgb: 9.8, MCV: 100, platelets: 302, monocytes: 23% (3100),CMP: unremarkable - Cr: 1.3, GFR: 43, TSAT: 4%, STFR: 7.4, Ferritn: 135, B12: 1830, MMA: 0.46 (1.72), copper: 1445, zinc: 40, SPEP/RADHA: wnl, k flc; 9.68, L FLC: 10,67, Ratio: 0.92    Interval History:    Ms. Floyd is accompanied today by her . She is able to do most ADLs on her own, can walk without assistance at home but does require a walker for long distances. She had recent prolonged hospitalization for GI bleed as above.     She is overall better after her recent hospitalization for an SBO.   (1) Epistaxis: She had a significant episode of epistaxis in 2023 (see ENT note on 23) requiring rhino rocket. Since then, her epistaxis has been mild, last episode on 3/23, lasted a few hours. No other clinical bleeding - denies GI bleeding, hematuria, vaginal bleeding etc. She was previously on PO iron but stopped in  due to constipation     Patient otherwise denies GI sx - no further episodes of bleeding, bowel movements have now normalized, denies  fatigue, fevers, chills, night sweats, headaches, chest pain, SOB, cough, abdominal pain, N/V, diarrhea, constipation, weight loss, rashes.     Review of Systems:  14-point review of systems was asked with the pertinent positives and/or negatives stated in HPI.     Past Medical History:   Nonobstructive CAD, HLD, HTN (HCTZ)/CKD, COPD/PH on TTE (on 2L home )2 after COVID in 11/2023), HFpEF, SAH (2/2 to ruptured aneurysm in 1999), Right Carotid artery stent for complication (iatrogenic rupture during cerebral angiography), Gout, R Rotator Cuff Tear, GERD,     Past Surgical History:   - Incarcerated inguinal hernia s/p necrotic small bowel resection (5/17/24)  - SAH due to ruptured aneurysm (1999 s/p clipping) c/b right carotid artery dissection requiring a stent, Left femur fracture after mechanical fall s/p L THR (5/2023)    Past Surgical History:   Procedure Laterality Date    ADENOIDECTOMY      ANTERIOR CRUCIATE LIGAMENT REPAIR  2012    APPENDECTOMY      CATARACT EXTRACTION W/  INTRAOCULAR LENS IMPLANT Right 11/07/2022    Procedure: EXTRACTION, CATARACT, WITH IOL INSERTION;  Surgeon: Higinio Cristina MD;  Location: [facility];  Service: Ophthalmology;  Laterality: Right;    CATARACT EXTRACTION W/  INTRAOCULAR LENS IMPLANT Left 11/21/2022    Procedure: EXTRACTION, CATARACT, WITH IOL INSERTION;  Surgeon: Higinio Cristina MD;  Location: [facility] OR;  Service: Ophthalmology;  Laterality: Left;    CEREBRAL ANEURYSM REPAIR  1999    clip    COLONOSCOPY N/A 5/16/2024    Procedure: COLONOSCOPY;  Surgeon: Rich Syed MD;  Location: [facility] (2ND FLR);  Service: Endoscopy;  Laterality: N/A;    DENTAL SURGERY      DIAGNOSTIC LAPAROSCOPY N/A 5/17/2024    Procedure: LAPAROSCOPY, DIAGNOSTIC;  Surgeon: Ruben Oscar MD;  Location: [facility] OR 2ND FLR;  Service: General;  Laterality: N/A;    ESOPHAGOGASTRODUODENOSCOPY N/A 5/15/2024    Procedure: EGD (ESOPHAGOGASTRODUODENOSCOPY);  Surgeon: Rich Syed MD;  Location: [facility]  ENDO (2ND FLR);  Service: Endoscopy;  Laterality: N/A;    EXCISION, SMALL INTESTINE N/A 5/17/2024    Procedure: EXCISION, SMALL INTESTINE;  Surgeon: Ruben Oscar MD;  Location: [facility] OR 2ND FLR;  Service: General;  Laterality: N/A;    EYE SURGERY Bilateral     cataract removal and lens implants    HIP ARTHROPLASTY Left 05/28/2023    Procedure: TOTAL HIP ARTHROPLASTY;  Surgeon: Juan Wan MD;  Location: [facility] OR [facility] FLR;  Service: Orthopedics;  Laterality: Left;    LAPAROTOMY, EXPLORATORY N/A 5/17/2024    Procedure: LAPAROTOMY, EXPLORATORY;  Surgeon: Ruben Oscar MD;  Location: [facility] OR [facility] FLR;  Service: General;  Laterality: N/A;    REPAIR, HERNIA, INGUINAL Bilateral 5/17/2024    Procedure: REPAIR, HERNIA, INGUINAL;  Surgeon: Ruben Oscar MD;  [facility];  Service: General;  Laterality: Bilateral;    TONSILLECTOMY       Family History:   - No known family history of cancer     Social History:   - originally from the [place], 3 daughters (1 daughter in [facility], 1 in [place] and 1 in [place]) and 4 granddaughters   - previously lived in [facility] [place]   - Smoking History: Former, quit in her 50s, started in her 20s, about 2 cigarettes per day  - Alcohol: socially, red wine   - Illicit Drug Use: denies    reports that she quit smoking about 25 years ago. Her smoking use included cigarettes. She started smoking about 45 years ago. She has a 10 pack-year smoking history. She has been exposed to tobacco smoke. She has never used smokeless tobacco. She reports current alcohol use of about 7.0 standard drinks of alcohol per week. She reports that she does not use drugs.    Allergies:  Review of patient's allergies indicates:   Allergen Reactions    Hydromorphone Anxiety, Other (See Comments) and Hallucinations     Severe agitation       Medications:  Current Outpatient Medications   Medication Sig Dispense Refill    allopurinoL (ZYLOPRIM) 100 MG tablet Take 2 tablets (200 mg  total) by mouth once daily.      amLODIPine (NORVASC) 5 MG tablet Take 1 tablet (5 mg total) by mouth once daily.      atorvastatin (LIPITOR) 80 MG tablet Take 1 tablet (80 mg total) by mouth once daily. 90 tablet 3    celecoxib (CELEBREX) 200 MG capsule Take 1 capsule (200 mg total) by mouth daily as needed for Pain.      colchicine (COLCRYS) 0.6 mg tablet Take 1 tablet (0.6 mg total) by mouth once daily. 30 tablet 8    fluticasone furoate-vilanteroL (BREO ELLIPTA) 100-25 mcg/dose diskus inhaler Inhale 1 puff into the lungs once daily. Controller      fluticasone propionate (FLONASE) 50 mcg/actuation nasal spray 2 sprays (100 mcg total) by Each Nostril route once daily.      furosemide (LASIX) 20 MG tablet Take 1 tablet (20 mg total) by mouth once daily.      hydroCHLOROthiazide (MICROZIDE) 12.5 mg capsule Take 1 capsule (12.5 mg total) by mouth once daily. 90 capsule 1    LIDOcaine 4 % Gel Apply 1 g topically 2 (two) times daily as needed (foot pain).      melatonin (MELATIN) 3 mg tablet Take 2 tablets (6 mg total) by mouth nightly as needed for Insomnia.      montelukast (SINGULAIR) 10 mg tablet Take 1 tablet (10 mg total) by mouth every evening. 30 tablet 6    pantoprazole (PROTONIX) 40 MG tablet Take 1 tablet (40 mg total) by mouth 2 (two) times daily.      QUEtiapine (SEROQUEL) 50 MG tablet Take 1 tablet (50 mg total) by mouth every evening. 30 tablet 5    sodium chloride-aloe vera Gel 1 Application by Nasal route daily as needed (Apply to each nostril daily for dryness).      syringe, disposable, 1 mL Syrg 1 Stick by Misc.(Non-Drug; Combo Route) route once a week. 25 each 1    tiotropium bromide (SPIRIVA RESPIMAT) 2.5 mcg/actuation inhaler Inhale 2 puffs into the lungs Daily. Controller      fluticasone propion-salmeterol 115-21 mcg/dose (ADVAIR HFA) 115-21 mcg/actuation HFAA inhaler Inhale 2 puffs into the lungs every 12 (twelve) hours. Controller      LIDOcaine-prilocaine (EMLA) cream Apply topically as  needed (Apply topically as needed). (Patient not taking: Reported on 7/23/2024) 30 g 2    polyethylene glycol (GLYCOLAX) 17 gram PwPk Take 17 g by mouth daily as needed for Constipation.       No current facility-administered medications for this visit.     Facility-Administered Medications Ordered in Other Visits   Medication Dose Route Frequency Provider Last Rate Last Admin    0.9% NaCl 250 mL flush bag   Intravenous PRN Agata Rolon MD 25 mL/hr at 07/23/24 1139 New Bag at 07/23/24 1139    alteplase injection 2 mg  2 mg Intra-Catheter PRN Agata Rolon MD        diphenhydrAMINE injection 50 mg  50 mg Intravenous Once PRN Agata Rolon MD        EPINEPHrine (EPIPEN) 0.3 mg/0.3 mL pen injection 0.3 mg  0.3 mg Intramuscular Once PRN Agata Rolon MD        heparin, porcine (PF) 100 unit/mL injection flush 500 Units  500 Units Intravenous PRN Agata Rolon MD        iron sucrose (VENOFER) 300 mg  in sodium chloride 0.9% 250 mL IVPB  300 mg Intravenous 1 time in Clinic/HOD Agata Rolon .7 mL/hr at 07/23/24 1155 300 mg at 07/23/24 1155    methylPREDNISolone sodium succinate injection 125 mg  125 mg Intravenous Once PRN Agata Rolon MD        mupirocin 2 % ointment   Nasal On Call Procedure Kang Diaz MD   Given at 10/25/23 1045    sodium chloride 0.9% bolus 1,000 mL 1,000 mL  1,000 mL Intravenous Once Agata Rolon MD        sodium chloride 0.9% flush 10 mL  10 mL Intravenous PRN Agata Rolon MD        tranexamic acid (CYKLOKAPRON) 1,000 mg in sodium chloride 0.9 % 100 mL IVPB (MB+)  1,000 mg Intravenous Once Kang Diaz MD        tranexamic acid (CYKLOKAPRON) 1,000 mg in sodium chloride 0.9 % 100 mL IVPB (MB+)  1,000 mg Intravenous Once Kang Diaz MD              Objective:   Vitals:   Vitals:    07/23/24 1043   BP: 106/69   BP Location: Left arm   Patient Position: Sitting   BP Method: Small (Automatic)   Pulse: 78   Temp: 97.9 °F (36.6  "°C)   TempSrc: Oral   SpO2: 95%   Weight: 48.3 kg (106 lb 7.7 oz)   Height: 5' 3" (1.6 m)       BMI: Body mass index is 18.86 kg/m².    Physical Exam  ECOG Performance Status:    General Appearance:    Alert, cooperative, no distress    Head:    Normocephalic, without obvious abnormality, atraumatic   Throat:   Lips, mucosa, and tongue normal; teeth and gums normal   Neck:   Supple, symmetrical, no adenopathy;     thyroid:  no enlargement/tenderness/nodules   Lungs:     Clear to auscultation bilaterally, respirations unlabored    Heart:    Regular rate and rhythm, S1 and S2 normal   Abdomen:     Soft, non-tender, bowel sounds active, no masses, no organomegaly   Extremities:   Extremities normal, atraumatic, no cyanosis or edema   Pulses:   2+ and symmetric all extremities   Skin:   Skin color, texture, turgor normal, no rashes or lesions   Lymph nodes:   Cervical, supraclavicular, and axillary nodes normal   Neurologic:   CNII-XII intact, normal strength, sensation     Laboratory Data:  No visits with results within 1 Week(s) from this visit.   Latest known visit with results is:   Lab Visit on 07/16/2024   Component Date Value Ref Range Status    WBC 07/16/2024 13.00 (H)  3.90 - 12.70 K/uL Final    RBC 07/16/2024 3.16 (L)  4.00 - 5.40 M/uL Final    Hemoglobin 07/16/2024 9.8 (L)  12.0 - 16.0 g/dL Final    Hematocrit 07/16/2024 31.5 (L)  37.0 - 48.5 % Final    MCV 07/16/2024 100 (H)  82 - 98 fL Final    MCH 07/16/2024 31.0  27.0 - 31.0 pg Final    MCHC 07/16/2024 31.1 (L)  32.0 - 36.0 g/dL Final    RDW 07/16/2024 22.1 (H)  11.5 - 14.5 % Final    Platelets 07/16/2024 302  150 - 450 K/uL Final    MPV 07/16/2024 9.9  9.2 - 12.9 fL Final    Immature Granulocytes 07/16/2024 2.2 (H)  0.0 - 0.5 % Final    Gran # (ANC) 07/16/2024 7.7  1.8 - 7.7 K/uL Final    Immature Grans (Abs) 07/16/2024 0.29 (H)  0.00 - 0.04 K/uL Final    Comment: Mild elevation in immature granulocytes is non specific and   can be seen in a variety of " conditions including stress response,   acute inflammation, trauma and pregnancy. Correlation with other   laboratory and clinical findings is essential.      Lymph # 07/16/2024 1.5  1.0 - 4.8 K/uL Final    Mono # 07/16/2024 3.1 (H)  0.3 - 1.0 K/uL Final    Eos # 07/16/2024 0.2  0.0 - 0.5 K/uL Final    Baso # 07/16/2024 0.25 (H)  0.00 - 0.20 K/uL Final    nRBC 07/16/2024 0  0 /100 WBC Final    Gran % 07/16/2024 59.3  38.0 - 73.0 % Final    Lymph % 07/16/2024 11.3 (L)  18.0 - 48.0 % Final    Mono % 07/16/2024 23.5 (H)  4.0 - 15.0 % Final    Eosinophil % 07/16/2024 1.8  0.0 - 8.0 % Final    Basophil % 07/16/2024 1.9  0.0 - 1.9 % Final    Platelet Estimate 07/16/2024 Appears normal   Final    Aniso 07/16/2024 Slight   Final    Poik 07/16/2024 Slight   Final    Hypo 07/16/2024 Occasional   Final    Ovalocytes 07/16/2024 Occasional   Final    Tear Drop Cells 07/16/2024 Occasional   Final    Toxic Granulation 07/16/2024 Present   Final    Differential Method 07/16/2024 Automated   Final    Sodium 07/16/2024 134 (L)  136 - 145 mmol/L Final    Potassium 07/16/2024 3.8  3.5 - 5.1 mmol/L Final    Chloride 07/16/2024 97  95 - 110 mmol/L Final    CO2 07/16/2024 28  23 - 29 mmol/L Final    Glucose 07/16/2024 114 (H)  70 - 110 mg/dL Final    BUN 07/16/2024 28 (H)  8 - 23 mg/dL Final    Creatinine 07/16/2024 1.3  0.5 - 1.4 mg/dL Final    Calcium 07/16/2024 8.6 (L)  8.7 - 10.5 mg/dL Final    Total Protein 07/16/2024 7.1  6.0 - 8.4 g/dL Final    Albumin 07/16/2024 3.1 (L)  3.5 - 5.2 g/dL Final    Total Bilirubin 07/16/2024 0.6  0.1 - 1.0 mg/dL Final    Comment: For infants and newborns, interpretation of results should be based  on gestational age, weight and in agreement with clinical  observations.    Premature Infant recommended reference ranges:  Up to 24 hours.............<8.0 mg/dL  Up to 48 hours............<12.0 mg/dL  3-5 days..................<15.0 mg/dL  6-29 days.................<15.0 mg/dL      Alkaline Phosphatase  07/16/2024 77  55 - 135 U/L Final    AST 07/16/2024 20  10 - 40 U/L Final    ALT 07/16/2024 16  10 - 44 U/L Final    eGFR 07/16/2024 43 (A)  >60 mL/min/1.73 m^2 Final    Anion Gap 07/16/2024 9  8 - 16 mmol/L Final    Ferritin 07/16/2024 135  20.0 - 300.0 ng/mL Final    Iron 07/16/2024 12 (L)  30 - 160 ug/dL Final    Transferrin 07/16/2024 224  200 - 375 mg/dL Final    TIBC 07/16/2024 332  250 - 450 ug/dL Final    Saturated Iron 07/16/2024 4 (L)  20 - 50 % Final    Vitamin B-12 07/16/2024 1830 (H)  210 - 950 pg/mL Final    Methlymalonic Acid 07/16/2024 0.46 (H)  <0.40 umol/L Final    Comment: If applicable, any drug confirmation testing reported  here was developed and the performance characteristics  determined by [facility]. This   confirmation testing has not been cleared or approved  by the FDA. The laboratory is regulated under CLIA as  qualified to perform high-complexity testing. This test  is used for patient testing purposes. It should not be  regarded as investigational or for research.    Test performed at [facility],  [address] [place]  [address]     [phone]  Wendi Laguerre MD, PhD - Medical Director      Protein, Serum 07/16/2024 6.7  6.0 - 8.4 g/dL Final    Comment: Serum protein electrophoresis and immunofixation results should be   interpreted in clinical context in that some therapeutic agents can   result   in false positive results (example, daratumumab). Correlation with   the   patient s therapeutic regimen is required.      Albumin 07/16/2024 3.32 (L)  3.35 - 5.55 g/dL Final    Alpha-1 07/16/2024 0.44 (H)  0.17 - 0.41 g/dL Final    Alpha-2 07/16/2024 0.74  0.43 - 0.99 g/dL Final    Beta 07/16/2024 0.74  0.50 - 1.10 g/dL Final    Gamma 07/16/2024 1.45  0.67 - 1.58 g/dL Final    Immunofix Interp. 07/16/2024 SEE COMMENT   Final    Comment: Serum protein electrophoresis and immunofixation results should be   interpreted in clinical context in that some  therapeutic agents can   result   in false positive results (example, daratumumab). Correlation with   the   patient s therapeutic regimen is required.  See pathologist's interpretation.      Becker Free Light Chains 07/16/2024 9.68 (H)  0.33 - 1.94 mg/dL Final    Lambda Free Light Chains 07/16/2024 10.57 (H)  0.57 - 2.63 mg/dL Final    Kappa/Lambda FLC Ratio 07/16/2024 0.92  0.26 - 1.65 Final    Comment: Undetected antigen excess is a rare event but cannot   be excluded. If these free light chain results do not   agree with other clinical or laboratory findings or   if the sample is from a patient that has previously   demonstrated antigen excess, discuss with the testing   laboratory.   Results should always be interpreted in conjunction   with other laboratory tests and clinical evidence.      Copper 07/16/2024 1445  810 - 1990 ug/L Final    Comment: Copper values may be elevated to twice the normal   levels in pregnancy.      Elevated results may be due to sample collected in a   non-certified trace element-free tube.     This test was developed and the performance   characteristics determined by [facility].   It has not been cleared or approved by the FDA.   The laboratory is regulated under CLIA as qualified to   perform high-complexity testing. This test is used for   patient testing purposes. It should not be regarded   as investigational or for research.    Test performed at [facility],  [facility] [place] [address] [place]  [address]     [phone]  Wendi Laguerre MD, PhD - Medical Director      Zinc 07/16/2024 40 (L)  60 - 130 ug/dL Final    Comment: Elevated results may be due to sample collected in a   non-certified trace element-free tube.     This test was developed and the performance   characteristics determined by [facility].   It has not been cleared or approved by the FDA.   The laboratory is regulated under CLIA as qualified to   perform high-complexity  testing. This test is used for   patient testing purposes. It should not be regarded   as investigational or for research.    Test performed at [facility],  [address] , [place]  [address]     [phone]  Wendi Laguerre MD, PhD - Medical Director      Sol. Transferrin Receptor 2024 7.4 (H)  1.8 - 4.6 mg/L Final    Comment: -------------------ADDITIONAL INFORMATION-------------------  It is reported that    Americans may   have slightly   higher values.    Test Performed by:  [facility] [place] [address]  : Raina Isaac Ph.D.; CLIA# [id number]      Pathologist Interpretation RADHA 2024 REVIEWED   Final    Comment:   Electronically reviewed and signed by:  Hina Nicholas MD  Signed on 24 at 13:01  No discrete monoclonal peaks identified.      Pathologist Interpretation SPE 2024 REVIEWED   Final    Comment:   Electronically reviewed and signed by:  Hina Nicholas MD  Signed on 24 at 13:02  Normal total protein.  No discrete paraprotein bands identified.         Imagin24: CTA: acute bleed at the level of the cecum - incidental findings: severe plaque burden at bilateral common iliac artery and bilateral common femoral artery     24: CT chest w/o contrast: COPD, scattered pulmonary nodules up to 7 mm in BJORN (exam limited by motion)    10/31/2023: CT chest- small new cluster of RLL micronodules, likely infectious/inflammatory     2023: CTA chest - evaluation limited due to motion artifact, patchy nonspecific GGOs involving lingula, RML and right lung base, may reflect infectious/inflammatory change     Endoscopies:   5/15/24: EGD: 2 cm hiatal hernia, otherwise unremarkable     24: Colonoscopy: poor prep   - clotted blood in entire colon; unable to visualize colon    Assessment   Assessment and Plan:   Jessica Floyd is a 73 y.o. female with  nonobstructive CAD, HTN/CKD3, COPD/PH, SAH (2/2 to ruptured aneurysm in 1999), Right carotid artery stent (iatrogenic rupture during cerebral angiography, on aspirin), HFpEF (lasix), Incarcerated inguinal hernia s/p necrotic small bowel resection (5/17/24). She is followed by Hematology for a 2 year history of macrocytic anemia, first identified in 10/2022 with lab evidence of iron deficiency and B12 deficiency. She presents today for follow up.     # Macrocytic Anemia  # Iron Deficiency    # Vitamin B12 Deficiency    - July 2024 Labs: CBC: WBC: 13, Hgb: 9.8, MCV: 100, platelets: 302, monocytes: 23% (3100),CMP: unremarkable - Cr: 1.3, GFR: 43, TSAT: 4%, STFR: 7.4, Ferritn: 135, B12: 1830, MMA: 0.46 (1.72), copper: 1445, zinc: 40, SPEP/RADHA: wnl, k flc; 9.68, L FLC: 10,67, Ratio: 0.92    - Causes of Mrs. Powers anemia is likely multifactorial with a component of iron deficiency (TSAT: 4%, ferritin previously <40, last received IV iron in May 2024), anemia of chronic disease/inflammation (her anemia nadirs during COVID, influenza infections, hospitalizations etc), anemia of renal disease (GFR: 43), vitamin b12 deficiency (MMA: 1.72, MCV >100), and possible early MDS vs. other bone marrow process based on age/elevated MCV.   - Iron deficiency source previously not entirely clear; but now w/ recent hospitalization for acute GI bleed as above - to continue to follow with GI to discuss repeating colonoscopy (5/2024 non- diagnostic)   - Given degree of anemia with Hgb <10, TSAT:4% and elevated STFR, proceed with IV iron infusion as below.      # Other Co-Morbidities  - B12 deficiency: IF abs negative, MMA: 0.46 from 1.72 after starting IM B12 in May 2024, continue IM B12, per PCP   - Leukocytosis: CTM after acute issues resolved, if persistent, may need additional workup  - Elevated FLCs: normal ratio, CTM  - Incarcerated inguinal hernia s/p necrotic small bowel resection (5/17/24): encouraged GI and surgery follow up  (GI messaged)  - COPD/Pulmonary nodules: noted on CT chest on 24, follow up with pulmonology in 2024 - would consider f/u CT chest in 3 months per Fleischner criteria  - PAD: severe plaque burden at bilateral common iliac artery and bilateral common femoral artery noted on CTA in May 2024, encouraged PCP f/u  - Epistaxis (2023, presumably from dry nasal mucosa, requiring rhino rocket): per ENT  - Nonobstructive CAD, HTN (HCTZ)/CKD3, COPD/PH on TTE, SAH ( to ruptured aneurysm in ), Right Carotid artery stent for complication (iatrogenic rupture during cerebral angiography), HFpEF (lasix): stable on current regimen, per PCP  - Cancer Screening: Colonoscopy:  (reportedly wnl), Pap:  (Pap: wnl, HPV: neg), Lung cancer screening? (CT chest: 10/2023 - new micronodules in RLL likely inflammatory but should f/u to ensure resolution - see above), MM2022: wnl    To do:  - RTC in 8 weeks from iron infusion with labs prior CBC, CMP, Peripheral Smear, Ferritin, Iron studies, Soluble Transferrin Receptor, Vit B12, MMA, folate, mgus labs prior - with Dr. Osorio or Chuckie as I'll be out on maternity leave  - RTC with me in 2025 with labs prior   - PCP (IM B12, MMG, pulmonary nodules), GI (scopes), Pulmonology (micronodules)  [] leukocytosis, FLC       Med Onc Chart Routing      Follow up with physician . RTC in 2025 with labs prior (please waitlist)   Follow up with PALMER    Infusion scheduling note    Injection scheduling note    Labs    Imaging    Pharmacy appointment    Other referrals                # Treatment Plam  - Medication: IV Iron Sucrose    - Dose: Iron Sucrose or Venofer 200 mg IV x 5 doses over a 14 day period  - Monitoring: Baseline CBC/Iron Profile and CBC/Iron Profile in 6 to 8 weeks post infusion   - Side effects reviewed. Discussed IV iron is typically overall well tolerated but risks include and are not limited to hypersensitivity rxns, hypotension, GI toxicity, HA,  myalgias, multisystem organ failure, infection, life threatening side effects including shock.  After reviewing risks, benefits, and alternatives, patient elects to proceed     MDM includes:    - Acute or chronic illness or injury that poses a threat to life or bodily function  - Review of prior external notes from unique source  - Independent review and explanation of 3+ results from unique tests   - Discussion of management and ordering 3+ unique tests   - Extensive discussion of treatment and management  - Prescription drug management  - Drug therapy requiring intensive monitoring for toxicity    - Overall, I discussed the diagnosis, history, stage, labs/imaging, prognosis, management, and treatment plan as applicable. I reviewed adverse short and long term effects as applicable.   - Informed patient if symptoms are getting worse that it is their responsibility to call the clinic and schedule follow up sooner than stated follow up. Also informed patient if they do not hear from the appointment center in 2-5 business days for their referrals, the patient must call the Oncology clinic so we can follow up on procedures or referral scheduling. Patient was fully informed of current medical plan, all questions were answered and patient verbalized understanding. No further questions.   - Face to Face Visit time I spent with the patient: 60 minutes of total time spent on the encounter, including counseling patient and/or coordinating care, which includes face to face time and non-face to face time preparing to see the patient (eg, review of tests), Obtaining and/or reviewing separately obtained history, Documenting clinical information in the electronic or other health record, Independently interpreting results (not separately reported) and communicating results to the patient/family/caregiver, or Care coordination (not separately reported).     Signed   Agata Rolon MD  [facility]

## 2024-07-23 NOTE — Clinical Note
Sorry one other thing I also noticed she has significant PAD on her CTA - severe plaque burden at bilateral common iliac artery and bilateral common femoral artery noted on CTA in May 2024, encouraged PCP f/u

## 2024-07-23 NOTE — PLAN OF CARE
Problem: Anemia  Goal: Anemia Symptom Improvement  Outcome: Progressing     Problem: Adult Inpatient Plan of Care  Goal: Plan of Care Review  Outcome: Progressing       Patient presented today for iv venofer infusion. PIV started to left wrist and infusion given with no issues. VS remained stable throughout visit and assessment provided no issues. PIV removed w/o complications and all questions answered. Patient scheduled for next appt on 7-30-24 and left clinic via wheelchair in no acute distress.

## 2024-07-25 ENCOUNTER — PATIENT MESSAGE (OUTPATIENT)
Dept: INTERNAL MEDICINE | Facility: CLINIC | Age: 74
End: 2024-07-25
Payer: MEDICARE

## 2024-07-25 NOTE — TELEPHONE ENCOUNTER
No care due was identified.  [facility] Embedded Care Due Messages. Reference number: [id number].   7/25/2024 10:47:53 AM CDT

## 2024-07-26 ENCOUNTER — TELEPHONE (OUTPATIENT)
Dept: GASTROENTEROLOGY | Facility: CLINIC | Age: 74
End: 2024-07-26
Payer: MEDICARE

## 2024-07-26 RX ORDER — HYDROCHLOROTHIAZIDE 12.5 MG/1
12.5 CAPSULE ORAL DAILY
Qty: 90 CAPSULE | Refills: 0 | Status: SHIPPED | OUTPATIENT
Start: 2024-07-26 | End: 2024-10-24

## 2024-07-26 NOTE — TELEPHONE ENCOUNTER
----- Message from Tiffany Amaya MA sent at 7/26/2024  1:53 PM CDT -----    ----- Message -----  From: Pamela Chavez  Sent: 7/26/2024   1:44 PM CDT  To: [facility] Staff    Type:  Needs Medical Advice    Who Called: pt  Symptoms (please be specific): pt needs to get in for follow up from bowel obstruction   Would the patient rather a call back or a response via [facility]? call  Best Call Back Number: [phone]  Additional Information:

## 2024-07-26 NOTE — TELEPHONE ENCOUNTER
Refill Routing Note   Medication(s) are not appropriate for processing by [facility] for the following reason(s):        ED/Hospital Visit since last OV with provider  New or recently adjusted medication    [facility] action(s):  Defer               Appointments  past 12m or future 3m with PCP    Date Provider   Last Visit   11/27/2023 Effie Olmedo MD   Next Visit   8/13/2024 Effie Olmedo MD   ED visits in past 90 days: 0        Note composed:10:08 PM 07/25/2024            To referral pool pleas verify the [facility] neurosurgery is not included in network      Please see other messages

## 2024-07-27 NOTE — CONSULTS
[facility] ([place] [id number])  Wound Care    Patient Name:  Jessica Floyd   MRN:  [id number]  Date: 2023  Diagnosis: Acute hypoxic respiratory failure    History:     Past Medical History:   Diagnosis Date    Allergic rhinitis     Amblyopia     Carotid stenosis     Coronary atherosclerosis     Outside [facility] 2014: 20% mLAD, 50% mCx, 20%, mRCA    Dyslipidemia     ESBL (extended spectrum beta-lactamase) producing bacteria infection     UTI    Hypertension     Nausea and vomiting 2017    Pulmonary emphysema 10/28/2023    SAH (subarachnoid hemorrhage) 2016, s/p clipping    Subarachnoid hemorrhage due to ruptured aneurysm        Social History     Socioeconomic History    Marital status:    Occupational History    Occupation: Retired   Tobacco Use    Smoking status: Former     Current packs/day: 0.00     Average packs/day: 0.5 packs/day for 20.0 years (10.0 ttl pk-yrs)     Types: Cigarettes     Start date: 1979     Quit date: 1999     Years since quittin.0     Passive exposure: Past    Smokeless tobacco: Never   Substance and Sexual Activity    Alcohol use: Yes     Alcohol/week: 7.0 standard drinks of alcohol     Types: 7 Glasses of wine per week     Comment: One glass of Red wine prior to dinner    Drug use: No    Sexual activity: Yes     Partners: Male   Social History Narrative    .  Has 3 grown daughters.       Social Determinants of Health     Financial Resource Strain: Low Risk  (2023)    Overall Financial Resource Strain (CARDIA)     Difficulty of Paying Living Expenses: Not hard at all   Food Insecurity: No Food Insecurity (2023)    Hunger Vital Sign     Worried About Running Out of Food in the Last Year: Never true     Ran Out of Food in the Last Year: Never true   Transportation Needs: No Transportation Needs (2023)    PRAPARE - Transportation     Lack of Transportation (Medical): No     Lack of Transportation (Non-Medical): No    Physical Activity: Insufficiently Active (11/27/2023)    Exercise Vital Sign     Days of Exercise per Week: 1 day     Minutes of Exercise per Session: 30 min   Stress: No Stress Concern Present (11/27/2023)    [employer] [place] [employer] - Occupational Stress Questionnaire     Feeling of Stress : Not at all   Recent Concern: Stress - Stress Concern Present (10/28/2023)    [employer] [place] [employer] - Occupational Stress Questionnaire     Feeling of Stress : To some extent   Social Connections: Socially Integrated (11/27/2023)    Social Connection and Isolation Panel [NHANES]     Frequency of Communication with Friends and Family: More than three times a week     Frequency of Social Gatherings with Friends and Family: More than three times a week     Attends Jewish Services: More than 4 times per year     Active Member of Clubs or Organizations: Yes     Attends Club or Organization Meetings: 1 to 4 times per year     Marital Status:    Housing Stability: Low Risk  (11/27/2023)    Housing Stability Vital Sign     Unable to Pay for Housing in the Last Year: No     Number of Places Lived in the Last Year: 1     Unstable Housing in the Last Year: No       Precautions:     Allergies as of 12/25/2023    (No Known Allergies)       [facility] Assessment Details/Treatment     Patient seen for wound care consultation. -placed by RN for wound to R buttock- per chart review: present on admission.    Reviewed chart for this encounter.   See Flow Sheet for findings.    Pt awake/ alert- and agreeable to targeted skin assmt with wound eval and care. Pt able to turn self and maintain position needed for care independently. R buttock and coccyx with punctate type wounds- both with slough present.R inner buttock with full thickness depth. Cleansed and filled space with Aquacel Ag for bacteriocidal property  and absorption- covered coccyx wound with same dressing- secured with border foam for added  protection and comfort. Pt tolerated care and assmt without c/o discomfort and able to voice good understanding re care provided.    RECOMMENDATIONS:  Coccyx/ Right buttock- altered skin integrity: 1)clean wound and surrounding area with normal saline. 2)fill R buttock wound with Aquacel Ag rope and cover coccyx wound with same dressing.  3)secure Aquacel Ag with border foam dressing. Perform care every other day.    Discussed POC with patient and charge nurse. IP skin integrity PALMER consulted.  See EMR for orders     Bedside nursing to continue care & monitoring.    Bedside nursing to maintain pressure injury prevention interventions.-Immerse specialty bed requested for increased comfort- decreased turn surface, and improved microclimate mgmt.  B heels clear.  Current documented Mamadou score is 17 with a nutrition subscale score of 3.     12/27/23 1545   WOCN Assessment   WOCN Total Time (mins) 45   Visit Date 12/27/23   Visit Time 1545   Consult Type New   WOCN Speciality Wound   Intervention assessed;changed;applied;chart review;orders   Teaching on-going  (pt- wound assmt/ recommendations for wound care. specialty  bed surface.)   Skin Interventions   Pressure Reduction Devices specialty bed utilized;foam padding utilized   Skin Protection adhesive use limited;incontinence pads utilized;silicone foam dressing in place   Positioning   Body Position position changed independently   Head of Bed (HOB) Positioning HOB elevated   Positioning/Transfer Devices pillows   Pressure Injury Prevention    Check Moisture Management Pad Done        Altered Skin Integrity 12/25/23 0900 medial Coccyx Ulceration Full thickness tissue loss. Subcutaneous fat may be visible but bone, tendon or muscle are not exposed   Date First Assessed/Time First Assessed: 12/25/23 0900   Altered Skin Integrity Present on Admission - Did Patient arrive to the hospital with altered skin?: yes  Orientation: medial  Location: Coccyx  Is this injury  device related?: No  Primary Wound...   Wound Image    Description of Altered Skin Integrity Full thickness tissue loss. Subcutaneous fat may be visible but bone, tendon or muscle are not exposed   Dressing Appearance Dried drainage   Drainage Amount Scant   Drainage Characteristics/Odor Serosanguineous   Appearance Pink;Red;Slough   Tissue loss description Full thickness   Periwound Area Redness  (epidermal sloughing.)   Wound Length (cm) 0.5 cm   Wound Width (cm) 0.5 cm   Wound Depth (cm) 1 cm   Wound Volume (cm^3) 0.25 cm^3   Wound Surface Area (cm^2) 0.25 cm^2   Care Cleansed with:;Sterile normal saline   Dressing Removed;Changed;Hydrofiber;Silver;Foam   Packing packed with;hydrofiber/alginate   Periwound Care Dry periwound area maintained   Dressing Change Due 12/29/23     Will continue to follow as needed and/ or as directed until discharge.        Danielle Steward BSN, RN, CWOCN    12/27/2023     36.3

## 2024-07-29 DIAGNOSIS — R91.8 MULTIPLE LUNG NODULES ON CT: Primary | ICD-10-CM

## 2024-07-30 ENCOUNTER — PATIENT MESSAGE (OUTPATIENT)
Dept: PRIMARY CARE CLINIC | Facility: CLINIC | Age: 74
End: 2024-07-30
Payer: MEDICARE

## 2024-07-30 ENCOUNTER — INFUSION (OUTPATIENT)
Dept: INFUSION THERAPY | Facility: OTHER | Age: 74
End: 2024-07-30
Attending: FAMILY MEDICINE
Payer: MEDICARE

## 2024-07-30 VITALS
DIASTOLIC BLOOD PRESSURE: 60 MMHG | OXYGEN SATURATION: 97 % | RESPIRATION RATE: 19 BRPM | HEART RATE: 80 BPM | SYSTOLIC BLOOD PRESSURE: 112 MMHG

## 2024-07-30 DIAGNOSIS — D50.0 IRON DEFICIENCY ANEMIA SECONDARY TO BLOOD LOSS (CHRONIC): Primary | ICD-10-CM

## 2024-07-30 PROCEDURE — 25000003 PHARM REV CODE 250: Performed by: INTERNAL MEDICINE

## 2024-07-30 PROCEDURE — 63600175 PHARM REV CODE 636 W HCPCS: Performed by: INTERNAL MEDICINE

## 2024-07-30 PROCEDURE — 96365 THER/PROPH/DIAG IV INF INIT: CPT

## 2024-07-30 RX ORDER — METHYLPREDNISOLONE SOD SUCC 125 MG
125 VIAL (EA) INJECTION ONCE AS NEEDED
Status: DISCONTINUED | OUTPATIENT
Start: 2024-07-30 | End: 2024-07-30 | Stop reason: HOSPADM

## 2024-07-30 RX ORDER — DIPHENHYDRAMINE HYDROCHLORIDE 50 MG/ML
50 INJECTION INTRAMUSCULAR; INTRAVENOUS ONCE AS NEEDED
Status: DISCONTINUED | OUTPATIENT
Start: 2024-07-30 | End: 2024-07-30 | Stop reason: HOSPADM

## 2024-07-30 RX ORDER — EPINEPHRINE 0.3 MG/.3ML
0.3 INJECTION SUBCUTANEOUS ONCE AS NEEDED
Status: DISCONTINUED | OUTPATIENT
Start: 2024-07-30 | End: 2024-07-30 | Stop reason: HOSPADM

## 2024-07-30 RX ADMIN — SODIUM CHLORIDE 1000 ML: 9 INJECTION, SOLUTION INTRAVENOUS at 09:07

## 2024-07-30 RX ADMIN — IRON SUCROSE 300 MG: 20 INJECTION, SOLUTION INTRAVENOUS at 09:07

## 2024-07-30 NOTE — PLAN OF CARE
Problem: Anemia  Goal: Anemia Symptom Improvement  Outcome: Progressing     Problem: Adult Inpatient Plan of Care  Goal: Plan of Care Review  Outcome: Progressing       Patient presented today for iv venofer infusion. PIV started to left wrist and infusion given with no issues. VS remained stable throughout visit and assessment provided no issues. PIV removed w/o complications and all questions answered. Patient scheduled for next appt on 8-6-24 and left clinic via wheelchair in no acute distress.

## 2024-08-06 ENCOUNTER — INFUSION (OUTPATIENT)
Dept: INFUSION THERAPY | Facility: OTHER | Age: 74
End: 2024-08-06
Attending: FAMILY MEDICINE
Payer: MEDICARE

## 2024-08-06 ENCOUNTER — OFFICE VISIT (OUTPATIENT)
Dept: PSYCHIATRY | Facility: CLINIC | Age: 74
End: 2024-08-06
Payer: MEDICARE

## 2024-08-06 VITALS
DIASTOLIC BLOOD PRESSURE: 56 MMHG | WEIGHT: 110 LBS | SYSTOLIC BLOOD PRESSURE: 105 MMHG | HEART RATE: 83 BPM | BODY MASS INDEX: 19.49 KG/M2

## 2024-08-06 VITALS
OXYGEN SATURATION: 99 % | DIASTOLIC BLOOD PRESSURE: 60 MMHG | RESPIRATION RATE: 16 BRPM | HEART RATE: 76 BPM | SYSTOLIC BLOOD PRESSURE: 132 MMHG

## 2024-08-06 DIAGNOSIS — Z87.81 HISTORY OF FRACTURE OF LEFT HIP: ICD-10-CM

## 2024-08-06 DIAGNOSIS — N18.31 CKD STAGE G3A/A1, GFR 45-59 AND ALBUMIN CREATININE RATIO <30 MG/G: ICD-10-CM

## 2024-08-06 DIAGNOSIS — R55 SITUATIONAL SYNCOPE: ICD-10-CM

## 2024-08-06 DIAGNOSIS — I65.23 BILATERAL CAROTID ARTERY STENOSIS: ICD-10-CM

## 2024-08-06 DIAGNOSIS — J43.9 PULMONARY EMPHYSEMA, UNSPECIFIED EMPHYSEMA TYPE: ICD-10-CM

## 2024-08-06 DIAGNOSIS — D50.0 IRON DEFICIENCY ANEMIA SECONDARY TO BLOOD LOSS (CHRONIC): Primary | ICD-10-CM

## 2024-08-06 DIAGNOSIS — F41.9 ANXIETY: Primary | ICD-10-CM

## 2024-08-06 DIAGNOSIS — E05.90 SUBCLINICAL HYPERTHYROIDISM: ICD-10-CM

## 2024-08-06 DIAGNOSIS — F17.211 NICOTINE DEPENDENCE, CIGARETTES, IN REMISSION: ICD-10-CM

## 2024-08-06 DIAGNOSIS — I25.10 CORONARY ARTERY DISEASE INVOLVING NATIVE CORONARY ARTERY OF NATIVE HEART WITHOUT ANGINA PECTORIS: ICD-10-CM

## 2024-08-06 PROCEDURE — 3078F DIAST BP <80 MM HG: CPT | Mod: CPTII,NTX,S$GLB, | Performed by: PSYCHIATRY & NEUROLOGY

## 2024-08-06 PROCEDURE — 25000003 PHARM REV CODE 250: Performed by: INTERNAL MEDICINE

## 2024-08-06 PROCEDURE — 1111F DSCHRG MED/CURRENT MED MERGE: CPT | Mod: CPTII,NTX,S$GLB, | Performed by: PSYCHIATRY & NEUROLOGY

## 2024-08-06 PROCEDURE — 99214 OFFICE O/P EST MOD 30 MIN: CPT | Mod: NTX,S$GLB,, | Performed by: PSYCHIATRY & NEUROLOGY

## 2024-08-06 PROCEDURE — 3074F SYST BP LT 130 MM HG: CPT | Mod: CPTII,NTX,S$GLB, | Performed by: PSYCHIATRY & NEUROLOGY

## 2024-08-06 PROCEDURE — 99999 PR PBB SHADOW E&M-EST. PATIENT-LVL II: CPT | Mod: PBBFAC,TXP,, | Performed by: PSYCHIATRY & NEUROLOGY

## 2024-08-06 PROCEDURE — 90833 PSYTX W PT W E/M 30 MIN: CPT | Mod: NTX,S$GLB,, | Performed by: PSYCHIATRY & NEUROLOGY

## 2024-08-06 PROCEDURE — 63600175 PHARM REV CODE 636 W HCPCS: Performed by: INTERNAL MEDICINE

## 2024-08-06 PROCEDURE — 96365 THER/PROPH/DIAG IV INF INIT: CPT

## 2024-08-06 RX ORDER — EPINEPHRINE 0.3 MG/.3ML
0.3 INJECTION SUBCUTANEOUS ONCE AS NEEDED
Status: DISCONTINUED | OUTPATIENT
Start: 2024-08-06 | End: 2024-08-06 | Stop reason: HOSPADM

## 2024-08-06 RX ORDER — QUETIAPINE FUMARATE 50 MG/1
50 TABLET, FILM COATED ORAL NIGHTLY
Qty: 90 TABLET | Refills: 1 | Status: SHIPPED | OUTPATIENT
Start: 2024-08-06 | End: 2025-02-02

## 2024-08-06 RX ORDER — METHYLPREDNISOLONE SOD SUCC 125 MG
125 VIAL (EA) INJECTION ONCE AS NEEDED
Status: DISCONTINUED | OUTPATIENT
Start: 2024-08-06 | End: 2024-08-06 | Stop reason: HOSPADM

## 2024-08-06 RX ORDER — DIPHENHYDRAMINE HYDROCHLORIDE 50 MG/ML
50 INJECTION INTRAMUSCULAR; INTRAVENOUS ONCE AS NEEDED
Status: DISCONTINUED | OUTPATIENT
Start: 2024-08-06 | End: 2024-08-06 | Stop reason: HOSPADM

## 2024-08-06 RX ADMIN — IRON SUCROSE 300 MG: 20 INJECTION, SOLUTION INTRAVENOUS at 09:08

## 2024-08-06 RX ADMIN — SODIUM CHLORIDE 1000 ML: 9 INJECTION, SOLUTION INTRAVENOUS at 09:08

## 2024-08-07 ENCOUNTER — DOCUMENT SCAN (OUTPATIENT)
Dept: HOME HEALTH SERVICES | Facility: HOSPITAL | Age: 74
End: 2024-08-07
Payer: MEDICARE

## 2024-08-09 ENCOUNTER — DOCUMENT SCAN (OUTPATIENT)
Dept: HOME HEALTH SERVICES | Facility: HOSPITAL | Age: 74
End: 2024-08-09
Payer: MEDICARE

## 2024-08-13 ENCOUNTER — INFUSION (OUTPATIENT)
Dept: INFUSION THERAPY | Facility: OTHER | Age: 74
End: 2024-08-13
Attending: FAMILY MEDICINE
Payer: MEDICARE

## 2024-08-13 VITALS — SYSTOLIC BLOOD PRESSURE: 111 MMHG | HEART RATE: 82 BPM | DIASTOLIC BLOOD PRESSURE: 59 MMHG

## 2024-08-13 DIAGNOSIS — D50.0 IRON DEFICIENCY ANEMIA SECONDARY TO BLOOD LOSS (CHRONIC): Primary | ICD-10-CM

## 2024-08-13 PROCEDURE — 96365 THER/PROPH/DIAG IV INF INIT: CPT

## 2024-08-13 PROCEDURE — 25000003 PHARM REV CODE 250: Performed by: INTERNAL MEDICINE

## 2024-08-13 PROCEDURE — 63600175 PHARM REV CODE 636 W HCPCS: Performed by: INTERNAL MEDICINE

## 2024-08-13 RX ORDER — DIPHENHYDRAMINE HYDROCHLORIDE 50 MG/ML
50 INJECTION INTRAMUSCULAR; INTRAVENOUS ONCE AS NEEDED
Status: DISCONTINUED | OUTPATIENT
Start: 2024-08-13 | End: 2024-08-13 | Stop reason: HOSPADM

## 2024-08-13 RX ORDER — METHYLPREDNISOLONE SOD SUCC 125 MG
125 VIAL (EA) INJECTION ONCE AS NEEDED
Status: DISCONTINUED | OUTPATIENT
Start: 2024-08-13 | End: 2024-08-13 | Stop reason: HOSPADM

## 2024-08-13 RX ORDER — EPINEPHRINE 0.3 MG/.3ML
0.3 INJECTION SUBCUTANEOUS ONCE AS NEEDED
Status: DISCONTINUED | OUTPATIENT
Start: 2024-08-13 | End: 2024-08-13 | Stop reason: HOSPADM

## 2024-08-13 RX ADMIN — SODIUM CHLORIDE 1000 ML: 9 INJECTION, SOLUTION INTRAVENOUS at 08:08

## 2024-08-13 RX ADMIN — IRON SUCROSE 300 MG: 20 INJECTION, SOLUTION INTRAVENOUS at 08:08

## 2024-08-13 NOTE — PLAN OF CARE
Problem: Anemia  Goal: Anemia Symptom Improvement  Outcome: Progressing     Problem: Adult Inpatient Plan of Care  Goal: Plan of Care Review  Outcome: Progressing       Patient presented today for iv venofer infusion. PIV started to LAC and infusion given with no issues. VS remained stable throughout visit and assessment provided no issues. PIV removed w/o complications, all questions answered and left clinic via wheelchair in no acute distress.

## 2024-08-19 ENCOUNTER — PATIENT MESSAGE (OUTPATIENT)
Dept: GASTROENTEROLOGY | Facility: CLINIC | Age: 74
End: 2024-08-19

## 2024-08-19 ENCOUNTER — OFFICE VISIT (OUTPATIENT)
Dept: GASTROENTEROLOGY | Facility: CLINIC | Age: 74
End: 2024-08-19
Payer: MEDICARE

## 2024-08-19 DIAGNOSIS — K21.9 GASTROESOPHAGEAL REFLUX DISEASE WITHOUT ESOPHAGITIS: Primary | ICD-10-CM

## 2024-08-19 DIAGNOSIS — Z09 HOSPITAL DISCHARGE FOLLOW-UP: ICD-10-CM

## 2024-08-19 DIAGNOSIS — D50.8 OTHER IRON DEFICIENCY ANEMIA: ICD-10-CM

## 2024-08-19 PROBLEM — J96.12 ACUTE HYPOXIC ON CHRONIC HYPERCAPNIC RESPIRATORY FAILURE: Status: RESOLVED | Noted: 2023-10-30 | Resolved: 2024-08-19

## 2024-08-19 PROBLEM — J96.01 ACUTE HYPOXIC ON CHRONIC HYPERCAPNIC RESPIRATORY FAILURE: Status: RESOLVED | Noted: 2023-10-30 | Resolved: 2024-08-19

## 2024-08-19 PROBLEM — K92.2 ACUTE LOWER GI BLEEDING: Status: RESOLVED | Noted: 2024-05-14 | Resolved: 2024-08-19

## 2024-08-19 PROCEDURE — 1160F RVW MEDS BY RX/DR IN RCRD: CPT | Mod: CPTII,95,NTX, | Performed by: NURSE PRACTITIONER

## 2024-08-19 PROCEDURE — 1159F MED LIST DOCD IN RCRD: CPT | Mod: CPTII,95,NTX, | Performed by: NURSE PRACTITIONER

## 2024-08-19 PROCEDURE — 99214 OFFICE O/P EST MOD 30 MIN: CPT | Mod: 95,NTX,, | Performed by: NURSE PRACTITIONER

## 2024-08-19 NOTE — PROGRESS NOTES
GASTROENTEROLOGY CLINIC NOTE    Chief Complaint: The primary encounter diagnosis was Gastroesophageal reflux disease without esophagitis. Diagnoses of Other iron deficiency anemia and Hospital discharge follow-up were also pertinent to this visit.  Referring provider/PCP: Effie Olmedo MD Eilenitin Floyd is a 74 y.o. female who is a new patient to me who presents for reflux and epigastric pain. She is accompanied by her . Patient hospitalized 12/2023 for SOB, Flu A, and COPD exacerbation.   During hospitalization had episode of chest pain with feeling of food caught in throat. Patient and  state this only happens when she is hospitalized. Does not occur at home. She takes Protonix 40mg daily.    When this occurred in hospital, she was prescribed reglan PRN and given GI cocktail which helped symptoms considerably. She had no further episodes during hospitalization and has not had any since discharge.   She has no complaints at this time. Appetite has improved since discharge and weight is improving.     Reflux: No. Controlled with daily Protonix.  Dysphagia/Odynophagia: No  Nausea/Vomiting: No  Appetite Changes: No  Abdominal Pain: No  Bowel Habits: regular bowel movements. No change in bowel habits.  GI Bleeding: Denies hematochezia, hematemesis, melena, BRBPR, Black/tarry stool, coffee ground emesis  Unexplained Weight Loss: No   ___________________________________________    Interval Note 8/19/2024    The patient location is: [place]  The chief complaint leading to consultation is: Hospital follow up    Visit type: audiovisual    Face to Face time with patient: 23:21  40 minutes of total time spent on the encounter, which includes face to face time and non-face to face time preparing to see the patient (eg, review of tests), Obtaining and/or reviewing separately obtained history, Documenting clinical information in the electronic or other health record, Independently interpreting results  (not separately reported) and communicating results to the patient/family/caregiver, or Care coordination (not separately reported).     Each patient to whom he or she provides medical services by telemedicine is:  (1) informed of the relationship between the physician and patient and the respective role of any other health care provider with respect to management of the patient; and (2) notified that he or she may decline to receive medical services by telemedicine and may withdraw from such care at any time.    Notes:    Ms. Floyd who is known to me presents via telemedicine encounter for hospital follow up. She is accompanied by her .   Since last office visit, she has been hospitalized twice. 5/2024 prolonged hospital stay for incarcerated inguinal hernia which required exploratory lap for ischemic bowel.   During hospitalization, she underwent EGD/Colonoscopy. EGD significant for 2 cm sliding hiatal hernia. Protonix BID controlling heartburn/reflux.   Colonoscopy with looping and redundant colon; aborted due to poor prep. Diverticula also noted.   Did well after discharge until 7/2024 when she developed abdominal pain, nausea, vomiting, and abdominal distension. CT concerning for SBO. She was managed conservatively and passed gastrograffin challenge.   Has been doing well since discharge. Bowel movements daily to every other day. She is watching her diet; taking in 1500 calories a day. Family is concerned about weight loss.     GLP-1s: No  NSAIDs: No  Anticoagulation or Antiplatelet: No    History of H.pylori:  H.pylori Treatment:  Prior Upper Endoscopy: 5/2024 with Dr. Syed  Impression:            - Normal esophagus.                          - 2 cm type-I sliding hiatal hernia.                          - Normal gastric body and antrum.                          - Normal examined duodenum.                          - No specimens collected.     Prior Colonoscopy:  5/2024 with Dr. Syed  Impression:             - Preparation of the colon was poor.                          - Clotted blood in the entire examined colon.                          - Diverticulosis in the sigmoid colon, in the                          descending colon and in the transverse colon.                          - The examination was otherwise normal.                          - No specimens collected.                          - Very difficult colon to maneuver with                          significant looping complicated by the presence of                          clots that could not be cleared.     Recommendation:   - I do not feel that repeat colonoscopy is in her                          best interest at this time given the difficulty of                          her colon and inability to advance to the cecum                          (risks outweigh benefits at this time). Monitor                          for signs of active bleeding and suggest repeat                          CTA for if there is evidence of bleeding.                         - No recommendation at this time regarding repeat                          colonoscopy.     Family h/o Colon Cancer: No  Family h/o Crohn's Disease or Ulcerative Colitis: No  Family h/o Celiac Sprue: No  Abdominal Surgeries: 5/17/2024 small bowel resection      Review of Systems   Constitutional:  Positive for weight loss.   HENT:  Negative for sore throat.    Eyes:  Negative for blurred vision.   Respiratory:  Negative for cough.    Cardiovascular:  Negative for chest pain.   Gastrointestinal:  Negative for abdominal pain, blood in stool, constipation, diarrhea, heartburn, melena, nausea and vomiting.   Genitourinary:  Negative for dysuria.   Musculoskeletal:  Negative for myalgias.   Skin:  Negative for rash.   Neurological:  Negative for headaches.   Endo/Heme/Allergies:  Negative for environmental allergies.   Psychiatric/Behavioral:  Negative for suicidal ideas. The patient is not nervous/anxious.         Past Medical History: has a past medical history of Allergic rhinitis, Amblyopia, Carotid stenosis, Coronary atherosclerosis, Dyslipidemia, ESBL (extended spectrum beta-lactamase) producing bacteria infection, Hypertension, Nausea and vomiting, Pulmonary emphysema, SAH (subarachnoid hemorrhage), and Subarachnoid hemorrhage due to ruptured aneurysm.    Past Surgical History: has a past surgical history that includes Anterior cruciate ligament repair (2012); Dental surgery; Cerebral aneurysm repair (1999); Tonsillectomy; Adenoidectomy; Appendectomy; Cataract extraction w/  intraocular lens implant (Right, 11/07/2022); Cataract extraction w/  intraocular lens implant (Left, 11/21/2022); Eye surgery (Bilateral); Hip Arthroplasty (Left, 05/28/2023); Esophagogastroduodenoscopy (N/A, 5/15/2024); Colonoscopy (N/A, 5/16/2024); Diagnostic laparoscopy (N/A, 5/17/2024); laparotomy, exploratory (N/A, 5/17/2024); excision, small intestine (N/A, 5/17/2024); and repair, hernia, inguinal (Bilateral, 5/17/2024).    Family History:family history includes Alcohol abuse in her father; Aneurysm in her mother; Gout in her brother; Heart disease in her brother; Hypertension in her father and mother; Kidney disease in her brother; No Known Problems in her daughter, daughter, and daughter.    Allergies:   Review of patient's allergies indicates:   Allergen Reactions    Hydromorphone Anxiety, Other (See Comments) and Hallucinations     Severe agitation       Social History: reports that she quit smoking about 25 years ago. Her smoking use included cigarettes. She started smoking about 45 years ago. She has a 10 pack-year smoking history. She has been exposed to tobacco smoke. She has never used smokeless tobacco. She reports current alcohol use of about 7.0 standard drinks of alcohol per week. She reports that she does not use drugs.    Home medications:   Current Outpatient Medications on File Prior to Visit   Medication Sig Dispense  Refill    allopurinoL (ZYLOPRIM) 100 MG tablet Take 2 tablets (200 mg total) by mouth once daily.      amLODIPine (NORVASC) 5 MG tablet Take 1 tablet (5 mg total) by mouth once daily.      atorvastatin (LIPITOR) 80 MG tablet Take 1 tablet (80 mg total) by mouth once daily. 90 tablet 3    colchicine (COLCRYS) 0.6 mg tablet Take 1 tablet (0.6 mg total) by mouth once daily. 30 tablet 8    fluticasone furoate-vilanteroL (BREO ELLIPTA) 100-25 mcg/dose diskus inhaler Inhale 1 puff into the lungs once daily. Controller      fluticasone propionate (FLONASE) 50 mcg/actuation nasal spray 2 sprays (100 mcg total) by Each Nostril route once daily.      furosemide (LASIX) 20 MG tablet Take 1 tablet (20 mg total) by mouth once daily.      hydroCHLOROthiazide (MICROZIDE) 12.5 mg capsule Take 1 capsule (12.5 mg total) by mouth once daily. 90 capsule 0    LIDOcaine 4 % Gel Apply 1 g topically 2 (two) times daily as needed (foot pain).      montelukast (SINGULAIR) 10 mg tablet Take 1 tablet (10 mg total) by mouth every evening. 30 tablet 6    pantoprazole (PROTONIX) 40 MG tablet Take 1 tablet (40 mg total) by mouth 2 (two) times daily.      QUEtiapine (SEROQUEL) 50 MG tablet Take 1 tablet (50 mg total) by mouth every evening. 90 tablet 1    syringe, disposable, 1 mL Syrg 1 Stick by Misc.(Non-Drug; Combo Route) route once a week. 25 each 1    [DISCONTINUED] fluticasone propion-salmeterol 115-21 mcg/dose (ADVAIR HFA) 115-21 mcg/actuation HFAA inhaler Inhale 2 puffs into the lungs every 12 (twelve) hours. Controller      [DISCONTINUED] polyethylene glycol (GLYCOLAX) 17 gram PwPk Take 17 g by mouth daily as needed for Constipation.      [DISCONTINUED] sodium chloride-aloe vera Gel 1 Application by Nasal route daily as needed (Apply to each nostril daily for dryness).       Current Facility-Administered Medications on File Prior to Visit   Medication Dose Route Frequency Provider Last Rate Last Admin    mupirocin 2 % ointment   Nasal On  "Call Procedure Kang Diaz MD   Given at 10/25/23 1045    tranexamic acid (CYKLOKAPRON) 1,000 mg in sodium chloride 0.9 % 100 mL IVPB (MB+)  1,000 mg Intravenous Once Kang Diaz MD        tranexamic acid (CYKLOKAPRON) 1,000 mg in sodium chloride 0.9 % 100 mL IVPB (MB+)  1,000 mg Intravenous Once Kang Diaz MD           Vital signs:  There were no vitals taken for this visit.    Physical Exam  Vitals reviewed.   Constitutional:       Appearance: Normal appearance.   HENT:      Head: Normocephalic.   Pulmonary:      Effort: Pulmonary effort is normal. No respiratory distress.   Abdominal:      General: There is no distension.      Palpations: Abdomen is soft.      Tenderness: There is no abdominal tenderness.   Musculoskeletal:      Comments: Ambulates with walker   Skin:     General: Skin is warm.   Neurological:      Mental Status: She is alert and oriented to person, place, and time.   Psychiatric:         Behavior: Behavior normal.         Thought Content: Thought content normal.         Routine labs:  Lab Results   Component Value Date    WBC 13.00 (H) 07/16/2024    HGB 9.8 (L) 07/16/2024    HCT 31.5 (L) 07/16/2024     (H) 07/16/2024     07/16/2024     Lab Results   Component Value Date    INR 1.0 07/04/2024     Lab Results   Component Value Date    IRON 12 (L) 07/16/2024    FERRITIN 135 07/16/2024    TIBC 332 07/16/2024    FESATURATED 4 (L) 07/16/2024     Lab Results   Component Value Date     (L) 07/16/2024    K 3.8 07/16/2024    CL 97 07/16/2024    CO2 28 07/16/2024    BUN 28 (H) 07/16/2024    CREATININE 1.3 07/16/2024     Lab Results   Component Value Date    ALBUMIN 3.1 (L) 07/16/2024    ALT 16 07/16/2024    AST 20 07/16/2024    ALKPHOS 77 07/16/2024    BILITOT 0.6 07/16/2024     No results found for: "GLUCOSE"  Lab Results   Component Value Date    TSH 0.719 10/28/2023     Lab Results   Component Value Date    CALCIUM 8.6 (L) 07/16/2024    PHOS 2.6 (L) 07/09/2024 "       Imaging:      I have reviewed prior labs, imaging, and notes.      Assessment:  1. Gastroesophageal reflux disease without esophagitis    2. Other iron deficiency anemia    3. Hospital discharge follow-up      Recent hospitalization for SBO which was managed conservatively. Prior hospitalization for incarcerated inguinal hernia and small bowel resection (5/2024)  EGD and Colonoscopy done 5/2024 without significant finding to explain anemia.   Follows with Hematology and receiving iron infusions.   Reflux controlled with Protonix.     Plan:       Continue Protonix as previously prescribed.   Follow with hematology as scheduled  Start daily fiber supplement such as metamucil or benefiber.       Plan of care discussed with patient who is in agreement and verbalized understanding.     I have explained the planned procedures to the patient.The risks, benefits and alternatives of the procedure were also explained in detail. Patient verbalized understanding, all questions were answered. The patient agrees to proceed as planned    Follow Up: RONAL Jimenez, APRN,FNP-BC  [facility] [place]    (Portions of this note were dictated using voice recognition software and may contain dictation related errors in spelling/grammar/syntax not found on text review)

## 2024-08-20 ENCOUNTER — HOSPITAL ENCOUNTER (OUTPATIENT)
Dept: PULMONOLOGY | Facility: CLINIC | Age: 74
Discharge: HOME | End: 2024-08-20
Payer: MEDICARE

## 2024-08-20 ENCOUNTER — OFFICE VISIT (OUTPATIENT)
Dept: TRANSPLANT | Facility: CLINIC | Age: 74
End: 2024-08-20
Payer: MEDICARE

## 2024-08-20 VITALS
RESPIRATION RATE: 16 BRPM | WEIGHT: 109 LBS | DIASTOLIC BLOOD PRESSURE: 73 MMHG | SYSTOLIC BLOOD PRESSURE: 111 MMHG | OXYGEN SATURATION: 94 % | HEIGHT: 63 IN | BODY MASS INDEX: 19.31 KG/M2 | TEMPERATURE: 98 F | HEART RATE: 72 BPM

## 2024-08-20 DIAGNOSIS — J43.9 PULMONARY EMPHYSEMA, UNSPECIFIED EMPHYSEMA TYPE: Primary | ICD-10-CM

## 2024-08-20 DIAGNOSIS — J96.11 CHRONIC HYPOXEMIC RESPIRATORY FAILURE: ICD-10-CM

## 2024-08-20 DIAGNOSIS — J43.9 PULMONARY EMPHYSEMA, UNSPECIFIED EMPHYSEMA TYPE: Chronic | ICD-10-CM

## 2024-08-20 DIAGNOSIS — J30.9 ALLERGIC RHINITIS, UNSPECIFIED SEASONALITY, UNSPECIFIED TRIGGER: ICD-10-CM

## 2024-08-20 PROCEDURE — 3074F SYST BP LT 130 MM HG: CPT | Mod: CPTII,NTX,S$GLB, | Performed by: INTERNAL MEDICINE

## 2024-08-20 PROCEDURE — 3288F FALL RISK ASSESSMENT DOCD: CPT | Mod: CPTII,NTX,S$GLB, | Performed by: INTERNAL MEDICINE

## 2024-08-20 PROCEDURE — 1125F AMNT PAIN NOTED PAIN PRSNT: CPT | Mod: CPTII,NTX,S$GLB, | Performed by: INTERNAL MEDICINE

## 2024-08-20 PROCEDURE — 1159F MED LIST DOCD IN RCRD: CPT | Mod: CPTII,NTX,S$GLB, | Performed by: INTERNAL MEDICINE

## 2024-08-20 PROCEDURE — 3078F DIAST BP <80 MM HG: CPT | Mod: CPTII,NTX,S$GLB, | Performed by: INTERNAL MEDICINE

## 2024-08-20 PROCEDURE — 1101F PT FALLS ASSESS-DOCD LE1/YR: CPT | Mod: CPTII,NTX,S$GLB, | Performed by: INTERNAL MEDICINE

## 2024-08-20 PROCEDURE — 1160F RVW MEDS BY RX/DR IN RCRD: CPT | Mod: CPTII,NTX,S$GLB, | Performed by: INTERNAL MEDICINE

## 2024-08-20 PROCEDURE — 99214 OFFICE O/P EST MOD 30 MIN: CPT | Mod: NTX,S$GLB,, | Performed by: INTERNAL MEDICINE

## 2024-08-20 PROCEDURE — 99999 PR PBB SHADOW E&M-EST. PATIENT-LVL III: CPT | Mod: PBBFAC,TXP,, | Performed by: INTERNAL MEDICINE

## 2024-08-20 NOTE — LETTER
August 20, 2024                      [facility]  [address]  [facility] [address]  Phone: [phone]   Patient: Jessica Floyd   MR Number: [id number]   YOB: 1950   Date of Visit: 8/20/2024       Dear       Thank you for referring Jessica Floyd to me for evaluation. Attached you will find relevant portions of my assessment and plan of care.    If you have questions, please do not hesitate to call me. I look forward to following Jessica Floyd along with you.    Sincerely,    Hina Roberts, DO    Enclosure    If you would like to receive this communication electronically, please contact [email or web address] or [phone] to request [employer] access.    [employer] is a tool which provides read-only access to select patient information with whom you have a relationship. Its easy to use and provides real time access to review your patients record including encounter summaries, notes, results, and demographic information.    If you feel you have received this communication in error or would no longer like to receive these types of communications, please e-mail [email or web address]

## 2024-08-20 NOTE — PROGRESS NOTES
ADVANCED LUNG DISEASE FOLLOW-UP                                                                                                                                             Reason for Visit:  Dyspnea; COPD    Referring Physician: Hina Roberts, *    History of Present Illness: Jessica Floyd is a 74 y.o. female who is on 1-2L of oxygen.  She is on no assisted ventilation.  Her New York Heart Association Class is II and a Karnofsky score of 90% - Able to carry on normal activity: minor symptoms of disease. She is not diabetic.    Here today for routine follow-up.  Since her last visit, she has had a prolonged hospitalization and multiple hospital admissions for GIB and ischemic bowel s/p resection of an incarcerated hernia.  Continues to follow with GI and with surgery.  From a breathing standpoint, she takes all inhalers as prescribed.  Feels like her breathing has improved and is at her baseline.  She continues to have post nasal gtt which she takes flonase for.  She has oxygen at home, but has not used it since her hospitalization in December for influenza.  She was unable to perform PFTs today due to difficulty with testing.      Previous Visti  Pt presents for follow-up of her COPD.  She has COPD with PH on TTE.  She was hospitalized in December for acute on chronic hypoxic RF 2/2 influenza.  She recovered and now feels back to her baseline.  She takes her inhalers as directed.  Has an albuterol prn inhaler but does not need to use it.  Remains active.  Does not have hypercapnea or require NIPPV.  She would like testing to see how much O2 she needs as she is planning a trip and does not have a portable concentrator to travel with.  She also endorses panic attacks and anxiety/PTSD over medical traumas in the past.  Has been trying to establish with psych without any luck.  Overall, feels at her baseline.         Past Medical History:   Diagnosis Date    Allergic rhinitis     Amblyopia     Carotid stenosis      Coronary atherosclerosis     Outside [facility] 6/2014: 20% mLAD, 50% mCx, 20%, mRCA    Dyslipidemia     ESBL (extended spectrum beta-lactamase) producing bacteria infection     UTI    Hypertension     Nausea and vomiting 05/26/2017    Pulmonary emphysema 10/28/2023    SAH (subarachnoid hemorrhage) 08/24/2016    1999, s/p clipping    Subarachnoid hemorrhage due to ruptured aneurysm 1999       Past Surgical History:   Procedure Laterality Date    ADENOIDECTOMY      ANTERIOR CRUCIATE LIGAMENT REPAIR  2012    APPENDECTOMY      CATARACT EXTRACTION W/  INTRAOCULAR LENS IMPLANT Right 11/07/2022    Procedure: EXTRACTION, CATARACT, WITH IOL INSERTION;  Surgeon: Higinio Cristina MD;  Location: [facility];  Service: Ophthalmology;  Laterality: Right;    CATARACT EXTRACTION W/  INTRAOCULAR LENS IMPLANT Left 11/21/2022    Procedure: EXTRACTION, CATARACT, WITH IOL INSERTION;  Surgeon: Higinio Cristina MD;  Location: [facility];  Service: Ophthalmology;  Laterality: Left;    CEREBRAL ANEURYSM REPAIR  1999    clip    COLONOSCOPY N/A 5/16/2024    Procedure: COLONOSCOPY;  Surgeon: Rich Syed MD;  Location: [facility] ([address] [place] [address]);  Service: Endoscopy;  Laterality: N/A;    DENTAL SURGERY      DIAGNOSTIC LAPAROSCOPY N/A 5/17/2024    Procedure: LAPAROSCOPY, DIAGNOSTIC;  Surgeon: Ruben Oscar MD;  [address];  Service: General;  Laterality: N/A;    ESOPHAGOGASTRODUODENOSCOPY N/A 5/15/2024    Procedure: EGD (ESOPHAGOGASTRODUODENOSCOPY);  Surgeon: Rich Syed MD;  Location: [facility] (2ND FLR);  Service: Endoscopy;  Laterality: N/A;    EXCISION, SMALL INTESTINE N/A 5/17/2024    Procedure: EXCISION, SMALL INTESTINE;  Surgeon: Ruben Oscar MD;  [address];  Service: General;  Laterality: N/A;    EYE SURGERY Bilateral     cataract removal and lens implants    HIP ARTHROPLASTY Left 05/28/2023    Procedure: TOTAL HIP ARTHROPLASTY;  Surgeon: Juan Wan MD;  [facility];   Service: Orthopedics;  Laterality: Left;    LAPAROTOMY, EXPLORATORY N/A 5/17/2024    Procedure: LAPAROTOMY, EXPLORATORY;  Surgeon: Ruben Oscar MD;  Location: [facility] OR [address] [place] [address];  Service: General;  Laterality: N/A;    REPAIR, HERNIA, INGUINAL Bilateral 5/17/2024    Procedure: REPAIR, HERNIA, INGUINAL;  Surgeon: Ruben Oscar MD;  [facility];  Service: General;  Laterality: Bilateral;    TONSILLECTOMY         Allergies: Hydromorphone    Current Outpatient Medications   Medication Sig    allopurinoL (ZYLOPRIM) 100 MG tablet Take 2 tablets (200 mg total) by mouth once daily.    amLODIPine (NORVASC) 5 MG tablet Take 1 tablet (5 mg total) by mouth once daily.    atorvastatin (LIPITOR) 80 MG tablet Take 1 tablet (80 mg total) by mouth once daily.    colchicine (COLCRYS) 0.6 mg tablet Take 1 tablet (0.6 mg total) by mouth once daily.    fluticasone furoate-vilanteroL (BREO ELLIPTA) 100-25 mcg/dose diskus inhaler Inhale 1 puff into the lungs once daily. Controller    fluticasone propionate (FLONASE) 50 mcg/actuation nasal spray 2 sprays (100 mcg total) by Each Nostril route once daily.    furosemide (LASIX) 20 MG tablet Take 1 tablet (20 mg total) by mouth once daily.    hydroCHLOROthiazide (MICROZIDE) 12.5 mg capsule Take 1 capsule (12.5 mg total) by mouth once daily.    LIDOcaine 4 % Gel Apply 1 g topically 2 (two) times daily as needed (foot pain).    montelukast (SINGULAIR) 10 mg tablet Take 1 tablet (10 mg total) by mouth every evening.    pantoprazole (PROTONIX) 40 MG tablet Take 1 tablet (40 mg total) by mouth 2 (two) times daily.    QUEtiapine (SEROQUEL) 50 MG tablet Take 1 tablet (50 mg total) by mouth every evening.    syringe, disposable, 1 mL Syrg 1 Stick by Misc.(Non-Drug; Combo Route) route once a week.     No current facility-administered medications for this visit.     Facility-Administered Medications Ordered in Other Visits   Medication    mupirocin 2 % ointment    tranexamic  acid (CYKLOKAPRON) 1,000 mg in sodium chloride 0.9 % 100 mL IVPB (MB+)    tranexamic acid (CYKLOKAPRON) 1,000 mg in sodium chloride 0.9 % 100 mL IVPB (MB+)       Immunization History   Administered Date(s) Administered    COVID-19, MRNA, LN-S, PF (Pfizer) (Gray Cap) 04/20/2022    COVID-19, MRNA, LN-S, PF (Pfizer) (Purple Cap) 01/09/2021, 01/30/2021, 08/23/2021    COVID-19, mRNA, LNP-S, PF, astrid-sucrose, 30 mcg/0.3 mL (Pfizer 2023 Ages 12+) 09/30/2023, 03/12/2024    COVID-19, mRNA, LNP-S, bivalent booster, PF (Moderna Omicron)12 + YEARS 09/30/2022    Influenza (FLUAD) - Quadrivalent - Adjuvanted - PF *Preferred* (65+) 09/30/2023    Influenza - High Dose - PF (65 years and older) 10/03/2016, 11/21/2017, 10/16/2018, 11/12/2019    Influenza - Quadrivalent - High Dose - PF (65 years and older) 10/02/2020, 10/08/2021, 09/24/2022    Pneumococcal Conjugate - 13 Valent 10/03/2016    Pneumococcal Polysaccharide - 23 Valent 11/21/2017    RSVpreF (Arexvy) 12/01/2023    Tdap 08/04/2017    Zoster Recombinant 10/08/2021, 05/23/2022     Family History:    Family History   Problem Relation Name Age of Onset    Hypertension Mother      Aneurysm Mother      Hypertension Father      Alcohol abuse Father      Kidney disease Brother      Heart disease Brother      Gout Brother      No Known Problems Daughter Hannah     No Known Problems Daughter Aby     No Known Problems Daughter Diya      Social History     Substance and Sexual Activity   Alcohol Use Yes    Alcohol/week: 7.0 standard drinks of alcohol    Types: 7 Glasses of wine per week    Comment: One glass of Red wine prior to dinner      Social History     Substance and Sexual Activity   Drug Use No      Social History     Socioeconomic History    Marital status:    Occupational History    Occupation: Retired   Tobacco Use    Smoking status: Former     Current packs/day: 0.00     Average packs/day: 0.5 packs/day for 20.0 years (10.0 ttl pk-yrs)     Types: Cigarettes      Start date: 1979     Quit date: 1999     Years since quittin.6     Passive exposure: Past    Smokeless tobacco: Never   Substance and Sexual Activity    Alcohol use: Yes     Alcohol/week: 7.0 standard drinks of alcohol     Types: 7 Glasses of wine per week     Comment: One glass of Red wine prior to dinner    Drug use: No    Sexual activity: Yes     Partners: Male   Social History Narrative    .  Has 3 grown daughters.       Social Determinants of Health     Financial Resource Strain: Low Risk  (2024)    Overall Financial Resource Strain (CARDIA)     Difficulty of Paying Living Expenses: Not very hard   Food Insecurity: No Food Insecurity (2024)    Hunger Vital Sign     Worried About Running Out of Food in the Last Year: Never true     Ran Out of Food in the Last Year: Never true   Transportation Needs: No Transportation Needs (2024)    TRANSPORTATION NEEDS     Transportation : No   Physical Activity: Sufficiently Active (2024)    Exercise Vital Sign     Days of Exercise per Week: 3 days     Minutes of Exercise per Session: 60 min   Recent Concern: Physical Activity - Inactive (2024)    Exercise Vital Sign     Days of Exercise per Week: 0 days     Minutes of Exercise per Session: 0 min   Stress: Stress Concern Present (2024)    [employer] [place] [employer] - Occupational Stress Questionnaire     Feeling of Stress : To some extent   Housing Stability: Low Risk  (2024)    Housing Stability Vital Sign     Unable to Pay for Housing in the Last Year: No     Homeless in the Last Year: No     Review of Systems   Constitutional:  Negative for chills, diaphoresis, fever, malaise/fatigue and weight loss.   HENT:  Negative for congestion, ear discharge, ear pain, hearing loss, nosebleeds, sinus pain, sore throat and tinnitus.    Eyes:  Negative for blurred vision, double vision, photophobia, pain, discharge and redness.   Respiratory:  Positive for shortness of  "breath. Negative for cough, hemoptysis, sputum production, wheezing and stridor.    Cardiovascular:  Negative for chest pain, palpitations, orthopnea, claudication, leg swelling and PND.   Gastrointestinal:  Negative for abdominal pain, blood in stool, constipation, diarrhea, heartburn, melena, nausea and vomiting.   Genitourinary:  Negative for dysuria, flank pain, frequency, hematuria and urgency.   Musculoskeletal:  Negative for back pain, falls, joint pain, myalgias and neck pain.   Skin:  Negative for itching and rash.   Neurological:  Negative for dizziness, tingling, tremors, sensory change, speech change, focal weakness, seizures, loss of consciousness, weakness and headaches.   Endo/Heme/Allergies:  Negative for environmental allergies and polydipsia. Does not bruise/bleed easily.   Psychiatric/Behavioral:  Negative for depression, hallucinations, memory loss, substance abuse and suicidal ideas. The patient is not nervous/anxious and does not have insomnia.      Vitals  /73 (BP Location: Left arm, Patient Position: Sitting, BP Method: Small (Automatic))   Pulse 72   Temp 97.7 °F (36.5 °C) (Oral)   Resp 16   Ht 5' 3" (1.6 m)   Wt 49.4 kg (109 lb)   SpO2 (!) 94%   BMI 19.31 kg/m²   Physical Exam  Vitals and nursing note reviewed.   Constitutional:       General: She is not in acute distress.     Appearance: She is well-developed. She is not diaphoretic.   HENT:      Head: Normocephalic and atraumatic.      Nose: Nose normal.      Mouth/Throat:      Pharynx: No oropharyngeal exudate.   Eyes:      General: No scleral icterus.     Conjunctiva/sclera: Conjunctivae normal.      Pupils: Pupils are equal, round, and reactive to light.   Neck:      Thyroid: No thyromegaly.      Vascular: No JVD.      Trachea: No tracheal deviation.   Cardiovascular:      Rate and Rhythm: Normal rate and regular rhythm.      Heart sounds: Normal heart sounds. No murmur heard.     No friction rub. No gallop.   Pulmonary: "      Effort: Pulmonary effort is normal. No respiratory distress.      Breath sounds: Decreased air movement (bilateral) present. No wheezing or rales.   Abdominal:      General: Bowel sounds are normal. There is no distension.      Palpations: Abdomen is soft.      Tenderness: There is no abdominal tenderness.   Musculoskeletal:         General: No deformity. Normal range of motion.      Cervical back: Normal range of motion and neck supple.   Skin:     General: Skin is warm and dry.      Capillary Refill: Capillary refill takes less than 2 seconds.      Coloration: Skin is not pale.      Findings: No erythema or rash.   Neurological:      Mental Status: She is alert and oriented to person, place, and time.      Cranial Nerves: No cranial nerve deficit.   Psychiatric:         Judgment: Judgment normal.         Labs:  No visits with results within 7 Day(s) from this visit.   Latest known visit with results is:   Lab Visit on 07/16/2024   Component Date Value    WBC 07/16/2024 13.00 (H)     RBC 07/16/2024 3.16 (L)     Hemoglobin 07/16/2024 9.8 (L)     Hematocrit 07/16/2024 31.5 (L)     MCV 07/16/2024 100 (H)     MCH 07/16/2024 31.0     MCHC 07/16/2024 31.1 (L)     RDW 07/16/2024 22.1 (H)     Platelets 07/16/2024 302     MPV 07/16/2024 9.9     Immature Granulocytes 07/16/2024 2.2 (H)     Gran # (ANC) 07/16/2024 7.7     Immature Grans (Abs) 07/16/2024 0.29 (H)     Lymph # 07/16/2024 1.5     Mono # 07/16/2024 3.1 (H)     Eos # 07/16/2024 0.2     Baso # 07/16/2024 0.25 (H)     nRBC 07/16/2024 0     Gran % 07/16/2024 59.3     Lymph % 07/16/2024 11.3 (L)     Mono % 07/16/2024 23.5 (H)     Eosinophil % 07/16/2024 1.8     Basophil % 07/16/2024 1.9     Platelet Estimate 07/16/2024 Appears normal     Aniso 07/16/2024 Slight     Poik 07/16/2024 Slight     Hypo 07/16/2024 Occasional     Ovalocytes 07/16/2024 Occasional     Tear Drop Cells 07/16/2024 Occasional     Toxic Granulation 07/16/2024 Present     Differential Method  07/16/2024 Automated     Sodium 07/16/2024 134 (L)     Potassium 07/16/2024 3.8     Chloride 07/16/2024 97     CO2 07/16/2024 28     Glucose 07/16/2024 114 (H)     BUN 07/16/2024 28 (H)     Creatinine 07/16/2024 1.3     Calcium 07/16/2024 8.6 (L)     Total Protein 07/16/2024 7.1     Albumin 07/16/2024 3.1 (L)     Total Bilirubin 07/16/2024 0.6     Alkaline Phosphatase 07/16/2024 77     AST 07/16/2024 20     ALT 07/16/2024 16     eGFR 07/16/2024 43 (A)     Anion Gap 07/16/2024 9     Ferritin 07/16/2024 135     Iron 07/16/2024 12 (L)     Transferrin 07/16/2024 224     TIBC 07/16/2024 332     Saturated Iron 07/16/2024 4 (L)     Vitamin B-12 07/16/2024 1830 (H)     Methlymalonic Acid 07/16/2024 0.46 (H)     Protein, Serum 07/16/2024 6.7     Albumin 07/16/2024 3.32 (L)     Alpha-1 07/16/2024 0.44 (H)     Alpha-2 07/16/2024 0.74     Beta 07/16/2024 0.74     Gamma 07/16/2024 1.45     Immunofix Interp. 07/16/2024 SEE COMMENT     [place]oca Free Light Chains 07/16/2024 9.68 (H)     Lambda Free Light Chains 07/16/2024 10.57 (H)     Kappa/Lambda FLC Ratio 07/16/2024 0.92     Copper 07/16/2024 1445     Zinc 07/16/2024 40 (L)     Sol. Transferrin Receptor 07/16/2024 7.4 (H)     Pathologist Interpretati* 07/16/2024 REVIEWED     Pathologist Interpretati* 07/16/2024 REVIEWED            12/1/2023     8:58 AM   Pulmonary Function Tests   FVC 1.95 liters   FEV1 1.12 liters   TLC (liters) 5.13 liters   DLCO (ml/mmHg sec) 5.92 ml/mmHg sec   FVC% 74   FEV1% 55   FEF 25-75 0.54   FEF 25-75% 31   TLC% 107   RV 3.18   RV% 152   DLCO% 29         1/31/2024    11:39 AM 12/1/2023     9:05 AM   6MW   6MWT Status completed with stops completed with stops   Patient Reported Dyspnea;Leg pain Dyspnea   Was O2 used? No No   6MW Distance walked (feet) 650 feet 650 feet   Distance walked (meters) 198.12 meters 198.12 meters   Did patient stop? Yes Yes   Oxygen Saturation 92 % 96 %   Supplemental Oxygen Room Air Room Air   Heart Rate 92 bpm 87 bpm   Blood  Pressure 132/81 138/72   Jessica Dyspnea Rating  nothing at all nothing at all   Oxygen Saturation 89 % 94 %   Supplemental Oxygen Room Air Room Air   Heart Rate 101 bpm 92 bpm   Blood Pressure 149/71 136/81   Jessica Dyspnea Rating  moderate moderate   Recovery Time (seconds) 72 seconds 101 seconds   Oxygen Saturation 91 % 97 %   Supplemental Oxygen Room Air Room Air   Heart Rate 93 bpm 79 bpm       Imaging:  Results for orders placed in visit on 09/23/18    XR CHEST PA AND LATERAL    Narrative  EXAMINATION:  XR CHEST PA AND LATERAL    CLINICAL HISTORY:  Cough    TECHNIQUE:  PA and lateral views of the chest were performed.    COMPARISON:  CT 05/25/2017, radiograph 05/25/2017    FINDINGS:  The cardiomediastinal silhouette is not enlarged, noting calcification of the aortic arch..  There is no pleural effusion.  The trachea is midline.  The lungs are symmetrically expanded bilaterally with mildly coarse interstitial attenuation, and mild bilateral basilar subsegmental atelectasis..  No large focal consolidation seen.  There is no pneumothorax.  The osseous structures are remarkable for degenerative changes..    IMPRESSION:    1. No acute cardiopulmonary process.      Electronically signed by: Armani Yuan MD  Date:    09/23/2018  Time:    12:50    Results for orders placed during the hospital encounter of 07/26/23        US Carotid Bilateral    Narrative  EXAMINATION:  US CAROTID BILATERAL    CLINICAL HISTORY:  check patency of known carotid stent given syncope;    TECHNIQUE:  Grayscale and color spectral Doppler ultrasound imaging of the carotid and vertebral artery systems bilaterally.    COMPARISON:  None    FINDINGS:  Patient with reported right common carotid artery stent.    Right:    Internal Carotid Artery (ICA) peak systolic velocity 22 cm/sec    Internal Carotid Artery (ICA) end-diastolic velocity 7 cm/sec    Common Carotid Artery (CCA) peak systolic velocity 30 cm/sec    Common Carotid Artery (CCA)  "end-diastolic velocity 8 cm/sec    ICA/CCA peak systolic velocity ratio: 0.7    ICA/CCA end-diastolic velocity ratio: 0.8    Plaque formation: Homogeneous    Vertebral artery: Antegrade flow and normal waveform.    Left:    Internal Carotid Artery (ICA)  peak systolic velocity 43 cm/sec    Internal Carotid Artery (ICA) end-diastolic velocity 15 cm/sec    Common Carotid Artery (CCA) peak systolic velocity 47 cm/sec    Common Carotid Artery (CCA) end-diastolic velocity 11 cm/sec    ICA/CCA peak systolic velocity ratio: 0.9    ICA/CCA end diastolic velocity ratio: 1.4    Plaque formation: Homogeneous    Vertebral artery: Antegrade flow and normal waveform.    Impression  1. Right common carotid artery stent patent.  2. 1 - 39% stenosis of the right internal carotid artery.  3. 1 - 39% stenosis of the left internal carotid artery.    Electronically signed by resident: Keo Jang  Date:    10/29/2023  Time:    08:45    Electronically signed by: Danis Guerrero Jr  Date:    10/29/2023  Time:    08:50    Results for orders placed during the hospital encounter of 10/28/23    CT Chest Without Contrast    Narrative  EXAMINATION:  CT CHEST WITHOUT CONTRAST    CLINICAL HISTORY:  "Dyspnea, chronic, unclear etiology;"    COMPARISON:  10/28/2023.    TECHNIQUE:  Volumetric data acquisition of the chest from the lung apices to the adrenals was obtained without intravenous contrast. Sagittal and coronal multiplanar reconstructions were performed. Lack of IV contrast material limits the assessment of mediastinal and abdominal structures.    FINDINGS:  The airways are patent.    The thyroid gland is homogeneous, normal in size.    No mediastinal, hilar or subcarinal adenopathy.    The thoracic aorta is of normal caliber and demonstrates moderate atherosclerotic plaque.    The cardiac silhouette appears within normal limits in size, no pericardial effusion.  Coronary artery calcifications seen.  Stent in the right common carotid " artery.    The esophagus appears normal in course and caliber.    Three lobular and paraseptal emphysema.    New cluster of micro nodules within the right lower lobe, 302:362.  Small Bochdalek hernia.    The left lung is well aerated.    No pleural effusion.    The axillary regions appear normal.    The upper abdominal organs demonstrate a left hepatic lobe low attenuating lesion 2.3 cm.    The osseous structures straight mild loss of height at T11-T12 L1, unchanged.  No new fracture.  Moderate degenerative change of the right shoulder joint.    Impression  Small, new cluster of micro nodules, right lower lobe, most often seen in the setting of infection, follow-up recommended after treatment completed to ensure resolution.    Emphysema.    Left hepatic lobe hypoattenuating lesion suggestive of a cyst consider non emergent ultrasound evaluation.    See other details above.    This report was flagged in Epic as abnormal.      Electronically signed by: Joan Fernández MD  Date:    10/31/2023  Time:    15:05        Cardiodiagnostics:  Results for orders placed during the hospital encounter of 10/28/23    Echo    Interpretation Summary    Left Ventricle: The left ventricle is normal in size. Normal wall thickness. Normal wall motion. There is normal systolic function with a visually estimated ejection fraction of 60 - 65%. There is normal diastolic function.    Right Ventricle: Mild right ventricular enlargement. Wall thickness is normal. Right ventricle wall motion  is normal. Systolic function is normal.    Aortic Valve: The aortic valve is a unicuspid valve. There is mild aortic valve sclerosis. There is trace aortic regurgitation.    Mitral Valve: There is mild regurgitation.    Pulmonary Artery: There is mild pulmonary hypertension. The estimated pulmonary artery systolic pressure is 40 mmHg.    IVC/SVC: Intermediate venous pressure at 8 mmHg.        Assessment:  1. Pulmonary emphysema, unspecified emphysema  type    2. Chronic hypoxemic respiratory failure    3. Allergic rhinitis, unspecified seasonality, unspecified trigger        Plan:   Followed for COPD.  On Breo and Spiriva.  Tolerating well.  Recent exacerbation in Dec 2023 2/2 influenza.  Has recovered and is at baseline following multiple hospitalizations for GI issues.  TTE with ePAP of 40mmHg likely related to hyperinflation from emphysema.  Needs a 6MWT for oxygen needs assessment.    Continue with allergy regimen.      States that she has a home concentrator and is on 1-2L at home.  Does not have a portable concentrator.  Will obtain 6MWT for oxygen needs assessment so we can update O2 orders and get a portable concentrator should she qualify.  She has not worn oxygen since her hospital discharge.      Follow-up in 3 months with no testing.  6MWT now to assess oxygen needs        Hina Roberts D.O.  Pulmonary/Critical Care and Lung Transplantation  [facility]

## 2024-08-22 ENCOUNTER — PATIENT MESSAGE (OUTPATIENT)
Dept: PSYCHIATRY | Facility: CLINIC | Age: 74
End: 2024-08-22
Payer: MEDICARE

## 2024-08-22 ENCOUNTER — TELEPHONE (OUTPATIENT)
Dept: TRANSPLANT | Facility: CLINIC | Age: 74
End: 2024-08-22
Payer: MEDICARE

## 2024-08-22 DIAGNOSIS — J43.9 PULMONARY EMPHYSEMA, UNSPECIFIED EMPHYSEMA TYPE: Primary | Chronic | ICD-10-CM

## 2024-08-22 NOTE — TELEPHONE ENCOUNTER
8/22/24: Per Dr. Roberts's clinic note from 8/20/24, patient should be scheduled for a 6 MWT to determine if she needs supplemental Oxygen. Sent patient a portal message to discuss options for scheduling an appointment.    8/23/24: Patient read the portal message but had not responded so I called her to discuss scheduling the 6 MWT. The patient asked if a slot was available on Tuesday, 8/27/24. Reviewed the available appointments and she chose 1100 am. Explained the check in location and process for this appointment. The patient verbalized her understanding and all questions at this time were answered to her satisfaction.

## 2024-08-24 NOTE — TELEPHONE ENCOUNTER
No care due was identified.  [facility] Embedded Care Due Messages. Reference number: [id number].   8/24/2024 9:08:30 AM CDT

## 2024-08-26 ENCOUNTER — PATIENT MESSAGE (OUTPATIENT)
Dept: PSYCHIATRY | Facility: CLINIC | Age: 74
End: 2024-08-26
Payer: MEDICARE

## 2024-08-26 RX ORDER — HYDROCHLOROTHIAZIDE 12.5 MG/1
12.5 CAPSULE ORAL
Qty: 90 CAPSULE | Refills: 0 | Status: SHIPPED | OUTPATIENT
Start: 2024-08-26

## 2024-08-26 NOTE — TELEPHONE ENCOUNTER
Refill Encounter    PCP Visits: Recent Visits  Date Type Provider Dept   11/27/23 Office Visit Effie Olmedo MD [facility]   Showing recent visits within past 360 days and meeting all other requirements  Future Appointments  No visits were found meeting these conditions.  Showing future appointments within next 720 days and meeting all other requirements     Last 3 Blood Pressure:   BP Readings from Last 3 Encounters:   08/20/24 111/73   08/13/24 (!) 111/59   08/06/24 132/60     Preferred Pharmacy:   [facility]/pharmacy #[id number] - [place] [id number] [address]  Phone: [phone] Fax: [phone]    Requested RX:  Requested Prescriptions     Pending Prescriptions Disp Refills    hydroCHLOROthiazide (MICROZIDE) 12.5 mg capsule [Pharmacy Med Name: HYDROCHLOROTHIAZIDE 12.5 MG CP] 90 capsule 0     Sig: TAKE 1 CAPSULE BY MOUTH ONCE DAILY      RX Route: Normal

## 2024-08-27 ENCOUNTER — TELEPHONE (OUTPATIENT)
Dept: TRANSPLANT | Facility: CLINIC | Age: 74
End: 2024-08-27
Payer: MEDICARE

## 2024-08-27 ENCOUNTER — PATIENT MESSAGE (OUTPATIENT)
Dept: TRANSPLANT | Facility: CLINIC | Age: 74
End: 2024-08-27
Payer: MEDICARE

## 2024-08-27 ENCOUNTER — HOSPITAL ENCOUNTER (OUTPATIENT)
Dept: PULMONOLOGY | Facility: CLINIC | Age: 74
Discharge: HOME | End: 2024-08-27
Payer: MEDICARE

## 2024-08-27 VITALS — BODY MASS INDEX: 19.14 KG/M2 | WEIGHT: 108 LBS | HEIGHT: 63 IN

## 2024-08-27 DIAGNOSIS — J43.9 PULMONARY EMPHYSEMA, UNSPECIFIED EMPHYSEMA TYPE: Chronic | ICD-10-CM

## 2024-08-27 NOTE — TELEPHONE ENCOUNTER
Sent patient a portal message to update her about her 6 MWT results.    ----- Message from Hina Roberts DO sent at 8/27/2024  1:49 PM CDT -----    No oxygen needs :)    ----- Message -----  From: Khushi Hampton RN  Sent: 8/27/2024  12:52 PM CDT  To: Hina Roberts DO    6 MWT to determine if updated orders for supplemental Oxygen are needed

## 2024-08-27 NOTE — PROCEDURES
Jessica Floyd is a 74 y.o.   female patient, who presents for a 6 minute walk test ordered by DO Alejandra.  The diagnosis is COPD/Emphysema.  The patient's BMI is 19.1 kg/m2.  Predicted distance (lower limit of normal) is 321.57 meters.      Test Results:    The test was completed without stopping.  The total time walked was 360 seconds.  During walking, the patient reported:  No complaints.  The patient used a walker during testing.     08/27/2024---------Distance: 206.35 meters (677 feet)     Lap Walk Time O2 Sat % Supplemental Oxygen Heart Rate Blood Pressure Jessica Scale Comment   Pre-exercise  (Resting) 0 0 91 % Room Air 84 bpm 81/54 mmHg 0    During Exercise 1 90 sec 90 % Room Air 79 bpm      During Exercise 2 208 sec 92 % Room Air 98 bpm      During Exercise 3 320 sec 90 % Room Air 95 bpm      End of Exercise  360 sec 95 % Room Air 94 bpm 113/68 mmHg 0    Post-exercise  (Recovery)   93 % Room Air  86 bpm 108/66 mmHg       Recovery Time: 182 seconds    Performing nurse/tech: Jennifer HERNANDEZ      PREVIOUS STUDY:   01/31/2024---------Distance: 198.12 meters (650 feet)       Lap Walk Time O2 Sat % Supplemental Oxygen Heart Rate Blood Pressure Jessica Scale Comments   Pre-exercise  (Resting) 0 0 92 % Room Air 92 bpm 132/81 mmHg 0     During Exercise 1 74 sec 91 % Room Air 96 bpm     Patient rested 13 seconds..   During Exercise 2 184 sec 90 % Room Air 106 bpm     Patient rested 66 seconds.   During Exercise 3 338 sec 90 % Room Air 111 bpm         End of Exercise   360 sec 89 % Room Air 101 bpm 149/71 mmHg 3     Post-exercise  (Recovery)     91 % Room Air  93 bpm             CLINICAL INTERPRETATION:  Six minute walk distance is 206.35 meters (677 feet) with no dyspnea.  At rest, oxygen saturation was moderately decreased while breathing room air.  During exercise, there was desaturation while breathing room air.  Blood pressure increased significantly and Heart rate remained stable with walking.  Hypotension was present  prior to exercise.  The patient did not report non-pulmonary symptoms during exercise.  Significant exercise impairment is likely due to desaturation, cardiovascular causes, and subjective symptoms.  The patient did complete the study, walking 360 seconds of the 360 second test.  Since the previous study in January 2024, exercise capacity is unchanged.  Based upon age and body mass index, exercise capacity is less than predicted.

## 2024-08-29 ENCOUNTER — TELEPHONE (OUTPATIENT)
Dept: PSYCHIATRY | Facility: HOSPITAL | Age: 74
End: 2024-08-29
Payer: MEDICARE

## 2024-08-29 DIAGNOSIS — F41.9 ANXIETY: ICD-10-CM

## 2024-08-29 RX ORDER — QUETIAPINE FUMARATE 50 MG/1
50 TABLET, FILM COATED ORAL 2 TIMES DAILY
Qty: 60 TABLET | Refills: 1 | Status: SHIPPED | OUTPATIENT
Start: 2024-08-29 | End: 2024-10-28

## 2024-08-29 NOTE — TELEPHONE ENCOUNTER
Patient sent in request for some extra Seroquel     Has been taking the Seroquel 50 mg once a night    Per her own message in she does need at time a few extra / citing her anxiety in reflex syncope    I had has the Pharm D to take a look / Clarisa Nicholas [facility] PhD    I called her and discussed with her plan for    My putting in new order for the Seroquel 50 mg twice a day with 1 refill to cover her until her appointment with me in late November 11/25/2024  2:00 PM ESTABLISHED PATIENT YANIRA Grace - [facility] [place] [address], Danis RUBIO MD     I did note that we would discuss at that time plan to revert to the 50 mg daily / would discuss clinical status at that time

## 2024-08-29 NOTE — PROGRESS NOTES
"  Patient sent in request for some extra Seroquel     Has been taking the Seroquel 50 mg once a night    Per her own message in she does need at time a few extra / citing her anxiety in reflex syncope    I had has the Pharm D to take a look / Clarisa Nicholas [facility] PhD    I called her and discussed with her plan for    My putting in new order for the Seroquel 50 mg twice a day with 1 refill to cover her until her appointment with me in late November 11/25/2024  2:00 PM ESTABLISHED PATIENT YANIRA Grace - [facility] [place] [address], Danis RUBIO MD     I did note that we would discuss at that time plan to revert to the 50 mg daily / would discuss clinical status at that time    REFERENCE INFO / copies from In Box Messaging:from 8-27-24    Erica Gallegos,  Spoke with pt and [facility]. The patient last got a 90 day supply on 7/3/24, which should have lasted until ~ 10/3/24 if taken as prescribed. Your most recent order sent on 8/6/24 was placed ON HOLD at the pharmacy since pt shouldn't need anything again until 10/3/24. However, pt recently called the pharmacy to request her order be filled because 1) she was taking extra as mentioned in her message so is running out sooner than she was taking as prescribed, 2) says she only has about 8-9 tabs left and 3) she's going on vacation next Monday and didn't want to run out.    So essentially, the issue is her insurance will not pay to refill it at this time because it's being requested well over 1 month "early". If you were to enter a new script with different directions, the insurance would pay since it's a dose change, hence the pharmacy suggestion about a new order of possibly 1 qam PRN and 1 qhs.    I also suspect she should have more than 8-9 tabs left if she only took extra 7 days in the last 3 months as mentioned below so there may be additional days where she took extra. She also states she spoke to you visit before last about these "extra doses."    Hope this helps " clarify things.     You  Clarisa Nicholas formerly [facility]; Taylor Woodson, BARRY3 days ago       Mon 8-26-24  7 pm    Clarisa [facility]:      Could you please  call [facility] and see IF any  better luck then I had? If unable let me know)    I called and was unable to speak to a live pharmacy staff;    They wanted me to leave a voicemail and call me back but I was not about to give my personal cell phone    As you can see by the message below I am not really looking to write for the medication p.r.n. basis as Seroquel    I looked at my last prescription was for 90 days I am not really sure what the issue is    If there is some work around / let me know    Any help appreciated    If you yourself need Something call my cell    Neno,  D Post Taylor Medina, RN routed conversation to You3 days ago     Jessica Gallegos Danis Staff (supporting You)3 days ago       Good morning Dr. Gallegos, the pharmacy will not fill the new prescription because there should be 7 pills left in the old prescription. As I have told you there are some days when I take 1 in the morning(if I am scheduled for anxiety inducing medical tests). I needed the morning pill on about 7 days the last 3 months I only take it if I really feel that there is a good chance that what the day could bring is too much for me. [facility] said if you change the instructions to say 1 in the morning as needed and 1 every evening they will fill it right away. I hope this is okay with you and you can help me out.  Thank you, Jessica Floyd

## 2024-09-02 ENCOUNTER — PATIENT MESSAGE (OUTPATIENT)
Dept: GASTROENTEROLOGY | Facility: CLINIC | Age: 74
End: 2024-09-02
Payer: MEDICARE

## 2024-09-06 ENCOUNTER — TELEPHONE (OUTPATIENT)
Dept: PRIMARY CARE CLINIC | Facility: CLINIC | Age: 74
End: 2024-09-06
Payer: MEDICARE

## 2024-09-06 NOTE — TELEPHONE ENCOUNTER
----- Message from Miriam Harris MA sent at 9/6/2024 11:43 AM CDT -----    ----- Message -----  From: Emperatriz Benavides MA  Sent: 9/6/2024  11:42 AM CDT  To: Dayami Benavides Staff    Name of Who is Calling: [email or web address] behalf of MARY SL[facility] [[id number]]                What is the request in detail: They need clarification on Celecoxib 200mg. Please assist.                Can the clinic reply by [facility]: No                What Number to Call Back if not in [facility]: [phone]

## 2024-09-10 ENCOUNTER — PATIENT MESSAGE (OUTPATIENT)
Dept: PODIATRY | Facility: CLINIC | Age: 74
End: 2024-09-10
Payer: MEDICARE

## 2024-09-10 ENCOUNTER — OFFICE VISIT (OUTPATIENT)
Dept: CARDIOLOGY | Facility: CLINIC | Age: 74
End: 2024-09-10
Payer: MEDICARE

## 2024-09-10 VITALS — HEART RATE: 80 BPM | OXYGEN SATURATION: 91 %

## 2024-09-10 DIAGNOSIS — I10 PRIMARY HYPERTENSION: ICD-10-CM

## 2024-09-10 DIAGNOSIS — I65.21 STENOSIS OF RIGHT CAROTID ARTERY: Primary | ICD-10-CM

## 2024-09-10 DIAGNOSIS — I25.10 ATHEROSCLEROSIS OF NATIVE CORONARY ARTERY OF NATIVE HEART WITHOUT ANGINA PECTORIS: ICD-10-CM

## 2024-09-10 DIAGNOSIS — R55 VASOVAGAL SYNCOPE: ICD-10-CM

## 2024-09-10 PROCEDURE — 1160F RVW MEDS BY RX/DR IN RCRD: CPT | Mod: CPTII,NTX,S$GLB, | Performed by: INTERNAL MEDICINE

## 2024-09-10 PROCEDURE — 99999 PR PBB SHADOW E&M-EST. PATIENT-LVL III: CPT | Mod: PBBFAC,TXP,, | Performed by: INTERNAL MEDICINE

## 2024-09-10 PROCEDURE — 3288F FALL RISK ASSESSMENT DOCD: CPT | Mod: CPTII,NTX,S$GLB, | Performed by: INTERNAL MEDICINE

## 2024-09-10 PROCEDURE — 1101F PT FALLS ASSESS-DOCD LE1/YR: CPT | Mod: CPTII,NTX,S$GLB, | Performed by: INTERNAL MEDICINE

## 2024-09-10 PROCEDURE — 99214 OFFICE O/P EST MOD 30 MIN: CPT | Mod: NTX,S$GLB,, | Performed by: INTERNAL MEDICINE

## 2024-09-10 PROCEDURE — 1159F MED LIST DOCD IN RCRD: CPT | Mod: CPTII,NTX,S$GLB, | Performed by: INTERNAL MEDICINE

## 2024-09-10 PROCEDURE — 1126F AMNT PAIN NOTED NONE PRSNT: CPT | Mod: CPTII,NTX,S$GLB, | Performed by: INTERNAL MEDICINE

## 2024-09-10 RX ORDER — HYDROCHLOROTHIAZIDE 25 MG/1
25 TABLET ORAL DAILY
Qty: 90 TABLET | Refills: 3 | Status: SHIPPED | OUTPATIENT
Start: 2024-09-10 | End: 2025-09-10

## 2024-09-10 NOTE — PROGRESS NOTES
[facility]    Chief Complaint  Chief Complaint   Patient presents with    Follow-up       HPI:    Recovering from a rough hospitalization.  Presented with a GI bleed.  Underwent embolization.  Also had an incarcerated inguinal hernia which required resection.  She is at home recovering.  Getting stronger.  Regaining some weight.  Has been self titrating hydrochlorothiazide.  At discharge it was decreased from 25 to 12.5 mg.  But now she is sometimes taking it twice a day because of elevated blood pressure.  No problems with fluid.  Not having to take Lasix.    Medications  Current Outpatient Medications   Medication Sig Dispense Refill    allopurinoL (ZYLOPRIM) 100 MG tablet Take 2 tablets (200 mg total) by mouth once daily.      amLODIPine (NORVASC) 5 MG tablet Take 1 tablet (5 mg total) by mouth once daily.      atorvastatin (LIPITOR) 80 MG tablet Take 1 tablet (80 mg total) by mouth once daily. 90 tablet 3    colchicine (COLCRYS) 0.6 mg tablet Take 1 tablet (0.6 mg total) by mouth once daily. 30 tablet 8    fluticasone furoate-vilanteroL (BREO ELLIPTA) 100-25 mcg/dose diskus inhaler Inhale 1 puff into the lungs once daily. Controller      fluticasone propionate (FLONASE) 50 mcg/actuation nasal spray 2 sprays (100 mcg total) by Each Nostril route once daily.      furosemide (LASIX) 20 MG tablet Take 1 tablet (20 mg total) by mouth once daily. (Patient taking differently: Take 20 mg by mouth once daily. As needed.)      LIDOcaine 4 % Gel Apply 1 g topically 2 (two) times daily as needed (foot pain).      montelukast (SINGULAIR) 10 mg tablet Take 1 tablet (10 mg total) by mouth every evening. 30 tablet 6    pantoprazole (PROTONIX) 40 MG tablet Take 1 tablet (40 mg total) by mouth 2 (two) times daily.      QUEtiapine (SEROQUEL) 50 MG tablet Take 1 tablet (50 mg total) by mouth 2 (two) times daily. 60 tablet 1    syringe, disposable, 1 mL Syrg 1 Stick by Misc.(Non-Drug; Combo Route) route once a week.  25 each 1    hydroCHLOROthiazide (HYDRODIURIL) 25 MG tablet Take 1 tablet (25 mg total) by mouth once daily. 90 tablet 3     No current facility-administered medications for this visit.     Facility-Administered Medications Ordered in Other Visits   Medication Dose Route Frequency Provider Last Rate Last Admin    mupirocin 2 % ointment   Nasal On Call Procedure Kang Diaz MD   Given at 10/25/23 1045    tranexamic acid (CYKLOKAPRON) 1,000 mg in sodium chloride 0.9 % 100 mL IVPB (MB+)  1,000 mg Intravenous Once Kang Diaz MD        tranexamic acid (CYKLOKAPRON) 1,000 mg in sodium chloride 0.9 % 100 mL IVPB (MB+)  1,000 mg Intravenous Once Kang Diaz MD            History  Past Medical History:   Diagnosis Date    Allergic rhinitis     Amblyopia     Carotid stenosis     Coronary atherosclerosis     Outside [facility] 6/2014: 20% mLAD, 50% mCx, 20%, mRCA    Dyslipidemia     ESBL (extended spectrum beta-lactamase) producing bacteria infection     UTI    Hypertension     Nausea and vomiting 05/26/2017    Pulmonary emphysema 10/28/2023    SAH (subarachnoid hemorrhage) 08/24/2016 1999, s/p clipping    Subarachnoid hemorrhage due to ruptured aneurysm 1999     Past Surgical History:   Procedure Laterality Date    ADENOIDECTOMY      ANTERIOR CRUCIATE LIGAMENT REPAIR  2012    APPENDECTOMY      CATARACT EXTRACTION W/  INTRAOCULAR LENS IMPLANT Right 11/07/2022    Procedure: EXTRACTION, CATARACT, WITH IOL INSERTION;  Surgeon: Higinio Cristina MD;  Location: [facility];  Service: Ophthalmology;  Laterality: Right;    CATARACT EXTRACTION W/  INTRAOCULAR LENS IMPLANT Left 11/21/2022    Procedure: EXTRACTION, CATARACT, WITH IOL INSERTION;  Surgeon: Higinio Cristina MD;  Location: [facility] OR;  Service: Ophthalmology;  Laterality: Left;    CEREBRAL ANEURYSM REPAIR  1999    clip    COLONOSCOPY N/A 5/16/2024    Procedure: COLONOSCOPY;  Surgeon: Rich Syed MD;  Location: [facility] ENDO ([address] [place] [address]);  Service: Endoscopy;  Laterality:  N/A;    DENTAL SURGERY      DIAGNOSTIC LAPAROSCOPY N/A 2024    Procedure: LAPAROSCOPY, DIAGNOSTIC;  Surgeon: Ruben Oscar MD;  Location: [facility] OR 2ND FLR;  Service: General;  Laterality: N/A;    ESOPHAGOGASTRODUODENOSCOPY N/A 5/15/2024    Procedure: EGD (ESOPHAGOGASTRODUODENOSCOPY);  Surgeon: Rich Syed MD;  Location: [facility] ENDO (2ND FLR);  Service: Endoscopy;  Laterality: N/A;    EXCISION, SMALL INTESTINE N/A 2024    Procedure: EXCISION, SMALL INTESTINE;  Surgeon: Ruben Oscar MD;  Location: [facility] OR 2ND FLR;  Service: General;  Laterality: N/A;    EYE SURGERY Bilateral     cataract removal and lens implants    HIP ARTHROPLASTY Left 2023    Procedure: TOTAL HIP ARTHROPLASTY;  Surgeon: Juan Wan MD;  Location: [facility] OR 2ND FLR;  Service: Orthopedics;  Laterality: Left;    LAPAROTOMY, EXPLORATORY N/A 2024    Procedure: LAPAROTOMY, EXPLORATORY;  Surgeon: Ruben Oscar MD;  Location: [facility] OR 2ND FLR;  Service: General;  Laterality: N/A;    REPAIR, HERNIA, INGUINAL Bilateral 2024    Procedure: REPAIR, HERNIA, INGUINAL;  Surgeon: Ruben Oscar MD;  Location: [facility] OR 2ND FLR;  Service: General;  Laterality: Bilateral;    TONSILLECTOMY       Social History     Socioeconomic History    Marital status:    Occupational History    Occupation: Retired   Tobacco Use    Smoking status: Former     Current packs/day: 0.00     Average packs/day: 0.5 packs/day for 20.0 years (10.0 ttl pk-yrs)     Types: Cigarettes     Start date: 1979     Quit date: 1999     Years since quittin.7     Passive exposure: Past    Smokeless tobacco: Never   Substance and Sexual Activity    Alcohol use: Yes     Alcohol/week: 7.0 standard drinks of alcohol     Types: 7 Glasses of wine per week     Comment: One glass of Red wine prior to dinner    Drug use: No    Sexual activity: Yes     Partners: Male   Social History Narrative    .  Has 3 grown  daughters.       Social Determinants of Health     Financial Resource Strain: Low Risk  (7/6/2024)    Overall Financial Resource Strain (CARDIA)     Difficulty of Paying Living Expenses: Not very hard   Food Insecurity: No Food Insecurity (7/6/2024)    Hunger Vital Sign     Worried About Running Out of Food in the Last Year: Never true     Ran Out of Food in the Last Year: Never true   Transportation Needs: No Transportation Needs (7/6/2024)    TRANSPORTATION NEEDS     Transportation : No   Physical Activity: Sufficiently Active (7/4/2024)    Exercise Vital Sign     Days of Exercise per Week: 3 days     Minutes of Exercise per Session: 60 min   Recent Concern: Physical Activity - Inactive (5/17/2024)    Exercise Vital Sign     Days of Exercise per Week: 0 days     Minutes of Exercise per Session: 0 min   Stress: Stress Concern Present (7/6/2024)    Citizen of [place] of Occupational Health - Occupational Stress Questionnaire     Feeling of Stress : To some extent   Housing Stability: Low Risk  (7/6/2024)    Housing Stability Vital Sign     Unable to Pay for Housing in the Last Year: No     Homeless in the Last Year: No     Family History   Problem Relation Name Age of Onset    Hypertension Mother      Aneurysm Mother      Hypertension Father      Alcohol abuse Father      Kidney disease Brother      Heart disease Brother      Gout Brother      No Known Problems Daughter Hannah     No Known Problems Daughter Aby     No Known Problems Daughter Diya         Allergies  Review of patient's allergies indicates:   Allergen Reactions    Hydromorphone Anxiety, Other (See Comments) and Hallucinations     Severe agitation       Review of Systems   Review of Systems   Constitutional: Negative for malaise/fatigue, weight gain and weight loss.   Eyes:  Negative for visual disturbance.   Cardiovascular:  Positive for dyspnea on exertion. Negative for chest pain, claudication, cyanosis, irregular heartbeat, leg swelling,  near-syncope, orthopnea, palpitations, paroxysmal nocturnal dyspnea and syncope.   Respiratory:  Negative for cough, hemoptysis, shortness of breath, sleep disturbances due to breathing and wheezing.    Hematologic/Lymphatic: Negative for bleeding problem. Does not bruise/bleed easily.   Skin:  Negative for poor wound healing.   Musculoskeletal:  Negative for muscle cramps and myalgias.   Gastrointestinal:  Negative for abdominal pain, anorexia, diarrhea, heartburn, hematemesis, hematochezia, melena, nausea and vomiting.   Genitourinary:  Negative for hematuria and nocturia.   Neurological:  Positive for weakness (Postoperatively.  Getting stronger.). Negative for excessive daytime sleepiness, dizziness, focal weakness and light-headedness.       Physical Exam  Vitals:    09/10/24 1018   BP: (P) 122/68   Pulse: 80     Wt Readings from Last 1 Encounters:   08/27/24 49 kg (108 lb)     Physical Exam  Constitutional:       General: She is not in acute distress.     Appearance: She is not toxic-appearing or diaphoretic.   HENT:      Head: Normocephalic and atraumatic.   Eyes:      General: No scleral icterus.     Conjunctiva/sclera: Conjunctivae normal.   Neck:      Thyroid: No thyromegaly.      Vascular: No carotid bruit, hepatojugular reflux or JVD.      Trachea: Trachea normal.   Cardiovascular:      Rate and Rhythm: Normal rate and regular rhythm. No extrasystoles are present.     Chest Wall: PMI is not displaced.      Pulses:           Carotid pulses are 2+ on the right side and 2+ on the left side.       Radial pulses are 2+ on the right side and 2+ on the left side.        Dorsalis pedis pulses are 2+ on the right side and 2+ on the left side.        Posterior tibial pulses are 2+ on the right side and 2+ on the left side.      Heart sounds: S1 normal and S2 normal. No murmur heard.     No S3 or S4 sounds.   Pulmonary:      Effort: No accessory muscle usage or respiratory distress.      Breath sounds: No decreased  breath sounds, wheezing, rhonchi or rales.   Abdominal:      General: Bowel sounds are normal. There is no abdominal bruit.      Palpations: Abdomen is soft. There is no hepatomegaly, splenomegaly or pulsatile mass.      Tenderness: There is no abdominal tenderness.   Musculoskeletal:         General: No tenderness or deformity.      Right lower leg: No edema.      Left lower leg: No edema.   Skin:     General: Skin is warm and dry.      Coloration: Skin is pale. Skin is not cyanotic.      Nails: There is no clubbing.   Neurological:      General: No focal deficit present.      Mental Status: She is alert and oriented to person, place, and time.   Psychiatric:         Speech: Speech normal.         Behavior: Behavior normal. Behavior is cooperative.         Labs  Lab Visit on 07/16/2024   Component Date Value Ref Range Status    WBC 07/16/2024 13.00 (H)  3.90 - 12.70 K/uL Final    RBC 07/16/2024 3.16 (L)  4.00 - 5.40 M/uL Final    Hemoglobin 07/16/2024 9.8 (L)  12.0 - 16.0 g/dL Final    Hematocrit 07/16/2024 31.5 (L)  37.0 - 48.5 % Final    MCV 07/16/2024 100 (H)  82 - 98 fL Final    MCH 07/16/2024 31.0  27.0 - 31.0 pg Final    MCHC 07/16/2024 31.1 (L)  32.0 - 36.0 g/dL Final    RDW 07/16/2024 22.1 (H)  11.5 - 14.5 % Final    Platelets 07/16/2024 302  150 - 450 K/uL Final    MPV 07/16/2024 9.9  9.2 - 12.9 fL Final    Immature Granulocytes 07/16/2024 2.2 (H)  0.0 - 0.5 % Final    Gran # (ANC) 07/16/2024 7.7  1.8 - 7.7 K/uL Final    Immature Grans (Abs) 07/16/2024 0.29 (H)  0.00 - 0.04 K/uL Final    Comment: Mild elevation in immature granulocytes is non specific and   can be seen in a variety of conditions including stress response,   acute inflammation, trauma and pregnancy. Correlation with other   laboratory and clinical findings is essential.      Lymph # 07/16/2024 1.5  1.0 - 4.8 K/uL Final    Mono # 07/16/2024 3.1 (H)  0.3 - 1.0 K/uL Final    Eos # 07/16/2024 0.2  0.0 - 0.5 K/uL Final    Baso # 07/16/2024 0.25  (H)  0.00 - 0.20 K/uL Final    nRBC 07/16/2024 0  0 /100 WBC Final    Gran % 07/16/2024 59.3  38.0 - 73.0 % Final    Lymph % 07/16/2024 11.3 (L)  18.0 - 48.0 % Final    Mono % 07/16/2024 23.5 (H)  4.0 - 15.0 % Final    Eosinophil % 07/16/2024 1.8  0.0 - 8.0 % Final    Basophil % 07/16/2024 1.9  0.0 - 1.9 % Final    Platelet Estimate 07/16/2024 Appears normal   Final    Aniso 07/16/2024 Slight   Final    Poik 07/16/2024 Slight   Final    Hypo 07/16/2024 Occasional   Final    Ovalocytes 07/16/2024 Occasional   Final    Tear Drop Cells 07/16/2024 Occasional   Final    Toxic Granulation 07/16/2024 Present   Final    Differential Method 07/16/2024 Automated   Final    Sodium 07/16/2024 134 (L)  136 - 145 mmol/L Final    Potassium 07/16/2024 3.8  3.5 - 5.1 mmol/L Final    Chloride 07/16/2024 97  95 - 110 mmol/L Final    CO2 07/16/2024 28  23 - 29 mmol/L Final    Glucose 07/16/2024 114 (H)  70 - 110 mg/dL Final    BUN 07/16/2024 28 (H)  8 - 23 mg/dL Final    Creatinine 07/16/2024 1.3  0.5 - 1.4 mg/dL Final    Calcium 07/16/2024 8.6 (L)  8.7 - 10.5 mg/dL Final    Total Protein 07/16/2024 7.1  6.0 - 8.4 g/dL Final    Albumin 07/16/2024 3.1 (L)  3.5 - 5.2 g/dL Final    Total Bilirubin 07/16/2024 0.6  0.1 - 1.0 mg/dL Final    Comment: For infants and newborns, interpretation of results should be based  on gestational age, weight and in agreement with clinical  observations.    Premature Infant recommended reference ranges:  Up to 24 hours.............<8.0 mg/dL  Up to 48 hours............<12.0 mg/dL  3-5 days..................<15.0 mg/dL  6-29 days.................<15.0 mg/dL      Alkaline Phosphatase 07/16/2024 77  55 - 135 U/L Final    AST 07/16/2024 20  10 - 40 U/L Final    ALT 07/16/2024 16  10 - 44 U/L Final    eGFR 07/16/2024 43 (A)  >60 mL/min/1.73 m^2 Final    Anion Gap 07/16/2024 9  8 - 16 mmol/L Final    Ferritin 07/16/2024 135  20.0 - 300.0 ng/mL Final    Iron 07/16/2024 12 (L)  30 - 160 ug/dL Final    Transferrin  07/16/2024 224  200 - 375 mg/dL Final    TIBC 07/16/2024 332  250 - 450 ug/dL Final    Saturated Iron 07/16/2024 4 (L)  20 - 50 % Final    Vitamin B-12 07/16/2024 1830 (H)  210 - 950 pg/mL Final    Methlymalonic Acid 07/16/2024 0.46 (H)  <0.40 umol/L Final    Comment: If applicable, any drug confirmation testing reported  here was developed and the performance characteristics  determined by [facility]. This   confirmation testing has not been cleared or approved  by the FDA. The laboratory is regulated under CLIA as  qualified to perform high-complexity testing. This test  is used for patient testing purposes. It should not be  regarded as investigational or for research.    Test performed at [facility],  [address] [place]  [address]     [phone]  Wendi Laguerre MD, PhD - Medical Director      Protein, Serum 07/16/2024 6.7  6.0 - 8.4 g/dL Final    Comment: Serum protein electrophoresis and immunofixation results should be   interpreted in clinical context in that some therapeutic agents can   result   in false positive results (example, daratumumab). Correlation with   the   patient s therapeutic regimen is required.      Albumin 07/16/2024 3.32 (L)  3.35 - 5.55 g/dL Final    Alpha-1 07/16/2024 0.44 (H)  0.17 - 0.41 g/dL Final    Alpha-2 07/16/2024 0.74  0.43 - 0.99 g/dL Final    Beta 07/16/2024 0.74  0.50 - 1.10 g/dL Final    Gamma 07/16/2024 1.45  0.67 - 1.58 g/dL Final    Immunofix Interp. 07/16/2024 SEE COMMENT   Final    Comment: Serum protein electrophoresis and immunofixation results should be   interpreted in clinical context in that some therapeutic agents can   result   in false positive results (example, daratumumab). Correlation with   the   patient s therapeutic regimen is required.  See pathologist's interpretation.      Herrick Free Light Chains 07/16/2024 9.68 (H)  0.33 - 1.94 mg/dL Final    Lambda Free Light Chains 07/16/2024 10.57 (H)  0.57 - 2.63 mg/dL  Final    Kappa/Lambda FLC Ratio 07/16/2024 0.92  0.26 - 1.65 Final    Comment: Undetected antigen excess is a rare event but cannot   be excluded. If these free light chain results do not   agree with other clinical or laboratory findings or   if the sample is from a patient that has previously   demonstrated antigen excess, discuss with the testing   laboratory.   Results should always be interpreted in conjunction   with other laboratory tests and clinical evidence.      Copper 07/16/2024 1445  810 - 1990 ug/L Final    Comment: Copper values may be elevated to twice the normal   levels in pregnancy.      Elevated results may be due to sample collected in a   non-certified trace element-free tube.     This test was developed and the performance   characteristics determined by [facility].   It has not been cleared or approved by the FDA.   The laboratory is regulated under CLIA as qualified to   perform high-complexity testing. This test is used for   patient testing purposes. It should not be regarded   as investigational or for research.    Test performed at [facility],  [address] [place]  [address]     [phone]  Wendi Laguerre MD, PhD - Medical Director      Zinc 07/16/2024 40 (L)  60 - 130 ug/dL Final    Comment: Elevated results may be due to sample collected in a   non-certified trace element-free tube.     This test was developed and the performance   characteristics determined by [facility].   It has not been cleared or approved by the FDA.   The laboratory is regulated under CLIA as qualified to   perform high-complexity testing. This test is used for   patient testing purposes. It should not be regarded   as investigational or for research.    Test performed at [facility],  [address] [place]  [address]     [phone]  Wendi Laguerre MD, PhD - Medical Director      Sol. Transferrin Receptor 07/16/2024 7.4 (H)  1.8 -  4.6 mg/L Final    Comment: -------------------ADDITIONAL INFORMATION-------------------  It is reported that    Americans may   have slightly   higher values.    Test Performed by:  [facility] - [address] [address]  : Raina Isaac Ph.D.; CLIA# [id number]      Pathologist Interpretation RADHA 07/16/2024 REVIEWED   Final    Comment:   Electronically reviewed and signed by:  Hina Nicholas MD  Signed on 07/17/24 at 13:01  No discrete monoclonal peaks identified.      Pathologist Interpretation SPE 07/16/2024 REVIEWED   Final    Comment:   Electronically reviewed and signed by:  Hina Nicholas MD  Signed on 07/17/24 at 13:02  Normal total protein.  No discrete paraprotein bands identified.     No results displayed because visit has over 200 results.      No results displayed because visit has over 200 results.      Admission on 03/29/2024, Discharged on 03/29/2024   Component Date Value Ref Range Status    WBC 03/29/2024 21.93 (H)  3.90 - 12.70 K/uL Final    RBC 03/29/2024 3.01 (L)  4.00 - 5.40 M/uL Final    Hemoglobin 03/29/2024 10.1 (L)  12.0 - 16.0 g/dL Final    Hematocrit 03/29/2024 31.8 (L)  37.0 - 48.5 % Final    MCV 03/29/2024 106 (H)  82 - 98 fL Final    MCH 03/29/2024 33.6 (H)  27.0 - 31.0 pg Final    MCHC 03/29/2024 31.8 (L)  32.0 - 36.0 g/dL Final    RDW 03/29/2024 17.4 (H)  11.5 - 14.5 % Final    Platelets 03/29/2024 424  150 - 450 K/uL Final    MPV 03/29/2024 10.9  9.2 - 12.9 fL Final    Immature Granulocytes 03/29/2024 1.9 (H)  0.0 - 0.5 % Final    Gran # (ANC) 03/29/2024 13.1 (H)  1.8 - 7.7 K/uL Final    Immature Grans (Abs) 03/29/2024 0.42 (H)  0.00 - 0.04 K/uL Final    Comment: Mild elevation in immature granulocytes is non specific and   can be seen in a variety of conditions including stress response,   acute inflammation, trauma and pregnancy. Correlation with other   laboratory and clinical findings is  essential.      Lymph # 03/29/2024 2.3  1.0 - 4.8 K/uL Final    Mono # 03/29/2024 5.1 (H)  0.3 - 1.0 K/uL Final    Eos # 03/29/2024 0.9 (H)  0.0 - 0.5 K/uL Final    Baso # 03/29/2024 0.16  0.00 - 0.20 K/uL Final    nRBC 03/29/2024 0  0 /100 WBC Final    Gran % 03/29/2024 59.6  38.0 - 73.0 % Final    Lymph % 03/29/2024 10.4 (L)  18.0 - 48.0 % Final    Mono % 03/29/2024 23.4 (H)  4.0 - 15.0 % Final    Eosinophil % 03/29/2024 4.0  0.0 - 8.0 % Final    Basophil % 03/29/2024 0.7  0.0 - 1.9 % Final    Platelet Estimate 03/29/2024 Appears normal   Final    Differential Method 03/29/2024 Automated   Final   Lab Visit on 03/26/2024   Component Date Value Ref Range Status    WBC 03/26/2024 12.81 (H)  3.90 - 12.70 K/uL Final    RBC 03/26/2024 3.05 (L)  4.00 - 5.40 M/uL Final    Hemoglobin 03/26/2024 10.0 (L)  12.0 - 16.0 g/dL Final    Hematocrit 03/26/2024 32.2 (L)  37.0 - 48.5 % Final    MCV 03/26/2024 106 (H)  82 - 98 fL Final    MCH 03/26/2024 32.8 (H)  27.0 - 31.0 pg Final    MCHC 03/26/2024 31.1 (L)  32.0 - 36.0 g/dL Final    RDW 03/26/2024 17.5 (H)  11.5 - 14.5 % Final    Platelets 03/26/2024 365  150 - 450 K/uL Final    MPV 03/26/2024 10.4  9.2 - 12.9 fL Final    Immature Granulocytes 03/26/2024 0.6 (H)  0.0 - 0.5 % Final    Gran # (ANC) 03/26/2024 7.2  1.8 - 7.7 K/uL Final    Immature Grans (Abs) 03/26/2024 0.08 (H)  0.00 - 0.04 K/uL Final    Comment: Mild elevation in immature granulocytes is non specific and   can be seen in a variety of conditions including stress response,   acute inflammation, trauma and pregnancy. Correlation with other   laboratory and clinical findings is essential.      Lymph # 03/26/2024 2.2  1.0 - 4.8 K/uL Final    Mono # 03/26/2024 2.4 (H)  0.3 - 1.0 K/uL Final    Eos # 03/26/2024 0.8 (H)  0.0 - 0.5 K/uL Final    Baso # 03/26/2024 0.16  0.00 - 0.20 K/uL Final    nRBC 03/26/2024 0  0 /100 WBC Final    Gran % 03/26/2024 56.3  38.0 - 73.0 % Final    Lymph % 03/26/2024 17.1 (L)  18.0 - 48.0 %  Final    Mono % 03/26/2024 18.6 (H)  4.0 - 15.0 % Final    Eosinophil % 03/26/2024 6.2  0.0 - 8.0 % Final    Basophil % 03/26/2024 1.2  0.0 - 1.9 % Final    Platelet Estimate 03/26/2024 Appears normal   Final    Aniso 03/26/2024 Slight   Final    Poik 03/26/2024 Slight   Final    Hypo 03/26/2024 Occasional   Final    Ovalocytes 03/26/2024 Occasional   Final    Differential Method 03/26/2024 Automated   Final    Sodium 03/26/2024 136  136 - 145 mmol/L Final    Potassium 03/26/2024 3.0 (L)  3.5 - 5.1 mmol/L Final    Chloride 03/26/2024 94 (L)  95 - 110 mmol/L Final    CO2 03/26/2024 29  23 - 29 mmol/L Final    Glucose 03/26/2024 99  70 - 110 mg/dL Final    BUN 03/26/2024 32 (H)  8 - 23 mg/dL Final    Creatinine 03/26/2024 1.1  0.5 - 1.4 mg/dL Final    Calcium 03/26/2024 9.7  8.7 - 10.5 mg/dL Final    Total Protein 03/26/2024 7.8  6.0 - 8.4 g/dL Final    Albumin 03/26/2024 3.7  3.5 - 5.2 g/dL Final    Total Bilirubin 03/26/2024 0.6  0.1 - 1.0 mg/dL Final    Comment: For infants and newborns, interpretation of results should be based  on gestational age, weight and in agreement with clinical  observations.    Premature Infant recommended reference ranges:  Up to 24 hours.............<8.0 mg/dL  Up to 48 hours............<12.0 mg/dL  3-5 days..................<15.0 mg/dL  6-29 days.................<15.0 mg/dL      Alkaline Phosphatase 03/26/2024 78  55 - 135 U/L Final    AST 03/26/2024 32  10 - 40 U/L Final    ALT 03/26/2024 22  10 - 44 U/L Final    eGFR 03/26/2024 53 (A)  >60 mL/min/1.73 m^2 Final    Anion Gap 03/26/2024 13  8 - 16 mmol/L Final    Vitamin B-12 03/26/2024 447  180 - 914 ng/L Final    Comment: Test Performed by:  [facility]  [address] [place] [address]  : Michael Barron M.D. Ph.D.; CLIA# [id number]      Gastrin 03/26/2024 142 (H)  <100 pg/mL Final    Intrinsic Factor 03/26/2024 Negative  Negative Final    AIF Comment 03/26/2024 SEE  BELOW   Final    Comment: Intrinsic Factor Blocking Antibody (IFBA) antibodies are   absent in approximately 50% of individuals with pernicious   anemia (PA). The absence of elevated IFBA antibodies does   not rule out the presence of PA; further studies such as   gastrin testing may be indicated.    Test Performed by:  [facility]  [address] [place] [address]  : Michael Barron M.D. Ph.D.; CLIA# [id number]      [facility] 03/26/2024 36  20.0 - 300.0 ng/mL Final       Imaging  No results found.    Assessment  1. Stenosis of right carotid artery  Due for reassessment  - CV Ultrasound Bilateral Doppler Carotid; Future    2. Vasovagal syncope  Stable    3. Primary hypertension  Controlled    4. Atherosclerosis of native coronary artery of native heart without angina pectoris  Nonobstructive      Plan and Discussion    No change to present management.  She is due for carotid Doppler.    The 10-year ASCVD risk score (Joyce DK, et al., 2019) is: 19.2%    Values used to calculate the score:      Age: 74 years      Sex: Female      Is Non- : No      Diabetic: No      Tobacco smoker: No      Systolic Blood Pressure: 122 mmHg      Is BP treated: Yes      HDL Cholesterol: 69 mg/dL      Total Cholesterol: 176 mg/dL     Follow Up  Follow up in about 1 year (around 9/10/2025).      Michael Lal MD

## 2024-09-12 ENCOUNTER — PATIENT MESSAGE (OUTPATIENT)
Dept: GASTROENTEROLOGY | Facility: CLINIC | Age: 74
End: 2024-09-12
Payer: MEDICARE

## 2024-09-16 ENCOUNTER — OFFICE VISIT (OUTPATIENT)
Dept: SPORTS MEDICINE | Facility: CLINIC | Age: 74
End: 2024-09-16
Payer: MEDICARE

## 2024-09-16 ENCOUNTER — OFFICE VISIT (OUTPATIENT)
Dept: GASTROENTEROLOGY | Facility: CLINIC | Age: 74
End: 2024-09-16
Payer: MEDICARE

## 2024-09-16 VITALS
DIASTOLIC BLOOD PRESSURE: 71 MMHG | WEIGHT: 108 LBS | HEART RATE: 84 BPM | SYSTOLIC BLOOD PRESSURE: 113 MMHG | BODY MASS INDEX: 19.14 KG/M2 | HEIGHT: 63 IN

## 2024-09-16 DIAGNOSIS — S46.011A TRAUMATIC COMPLETE TEAR OF RIGHT ROTATOR CUFF, INITIAL ENCOUNTER: Primary | ICD-10-CM

## 2024-09-16 DIAGNOSIS — D50.8 OTHER IRON DEFICIENCY ANEMIA: ICD-10-CM

## 2024-09-16 DIAGNOSIS — M19.011 LOCALIZED PRIMARY OSTEOARTHRITIS OF RIGHT SHOULDER REGION: ICD-10-CM

## 2024-09-16 DIAGNOSIS — K21.9 GASTROESOPHAGEAL REFLUX DISEASE WITHOUT ESOPHAGITIS: Primary | ICD-10-CM

## 2024-09-16 PROCEDURE — 1125F AMNT PAIN NOTED PAIN PRSNT: CPT | Mod: CPTII,S$GLB,TXP, | Performed by: PHYSICIAN ASSISTANT

## 2024-09-16 PROCEDURE — 1160F RVW MEDS BY RX/DR IN RCRD: CPT | Mod: CPTII,NTX,S$GLB, | Performed by: NURSE PRACTITIONER

## 2024-09-16 PROCEDURE — 1126F AMNT PAIN NOTED NONE PRSNT: CPT | Mod: CPTII,NTX,S$GLB, | Performed by: NURSE PRACTITIONER

## 2024-09-16 PROCEDURE — 99999 PR PBB SHADOW E&M-EST. PATIENT-LVL IV: CPT | Mod: PBBFAC,TXP,, | Performed by: PHYSICIAN ASSISTANT

## 2024-09-16 PROCEDURE — 3074F SYST BP LT 130 MM HG: CPT | Mod: CPTII,S$GLB,TXP, | Performed by: PHYSICIAN ASSISTANT

## 2024-09-16 PROCEDURE — 3078F DIAST BP <80 MM HG: CPT | Mod: CPTII,S$GLB,TXP, | Performed by: PHYSICIAN ASSISTANT

## 2024-09-16 PROCEDURE — 20611 DRAIN/INJ JOINT/BURSA W/US: CPT | Mod: S$GLB,TXP,, | Performed by: PHYSICIAN ASSISTANT

## 2024-09-16 PROCEDURE — 1159F MED LIST DOCD IN RCRD: CPT | Mod: CPTII,S$GLB,TXP, | Performed by: PHYSICIAN ASSISTANT

## 2024-09-16 PROCEDURE — 1159F MED LIST DOCD IN RCRD: CPT | Mod: CPTII,NTX,S$GLB, | Performed by: NURSE PRACTITIONER

## 2024-09-16 PROCEDURE — 1101F PT FALLS ASSESS-DOCD LE1/YR: CPT | Mod: CPTII,NTX,S$GLB, | Performed by: NURSE PRACTITIONER

## 2024-09-16 PROCEDURE — 99213 OFFICE O/P EST LOW 20 MIN: CPT | Mod: 25,S$GLB,TXP, | Performed by: PHYSICIAN ASSISTANT

## 2024-09-16 PROCEDURE — 3288F FALL RISK ASSESSMENT DOCD: CPT | Mod: CPTII,NTX,S$GLB, | Performed by: NURSE PRACTITIONER

## 2024-09-16 PROCEDURE — 99214 OFFICE O/P EST MOD 30 MIN: CPT | Mod: NTX,S$GLB,, | Performed by: NURSE PRACTITIONER

## 2024-09-16 PROCEDURE — 1160F RVW MEDS BY RX/DR IN RCRD: CPT | Mod: CPTII,S$GLB,TXP, | Performed by: PHYSICIAN ASSISTANT

## 2024-09-16 PROCEDURE — 99999 PR PBB SHADOW E&M-EST. PATIENT-LVL III: CPT | Mod: PBBFAC,TXP,, | Performed by: NURSE PRACTITIONER

## 2024-09-16 RX ORDER — PANTOPRAZOLE SODIUM 40 MG/1
40 TABLET, DELAYED RELEASE ORAL 2 TIMES DAILY
Qty: 180 TABLET | Refills: 3 | Status: SHIPPED | OUTPATIENT
Start: 2024-09-16 | End: 2025-09-16

## 2024-09-16 RX ORDER — BUPIVACAINE HYDROCHLORIDE 2.5 MG/ML
2 INJECTION, SOLUTION INFILTRATION; PERINEURAL
Status: DISCONTINUED | OUTPATIENT
Start: 2024-09-16 | End: 2024-09-16 | Stop reason: HOSPADM

## 2024-09-16 RX ORDER — TRIAMCINOLONE ACETONIDE 40 MG/ML
40 INJECTION, SUSPENSION INTRA-ARTICULAR; INTRAMUSCULAR
Status: DISCONTINUED | OUTPATIENT
Start: 2024-09-16 | End: 2024-09-16 | Stop reason: HOSPADM

## 2024-09-16 RX ADMIN — BUPIVACAINE HYDROCHLORIDE 2 ML: 2.5 INJECTION, SOLUTION INFILTRATION; PERINEURAL at 10:09

## 2024-09-16 RX ADMIN — TRIAMCINOLONE ACETONIDE 40 MG: 40 INJECTION, SUSPENSION INTRA-ARTICULAR; INTRAMUSCULAR at 10:09

## 2024-09-16 NOTE — PROGRESS NOTES
ESTABLISHED PATIENT    History 9/16/2024:  Jessica returns here today for follow-up evaluation of her right shoulder.  She complains today of having recurrent pain due to her chronic rotator cuff tear and is requesting a repeat injection.    History 6/17/2024:  Jessica returns here today for follow-up evaluation of her right shoulder.  She complains today of having recurrent pain in his requesting a repeat injection.  She was recently hospitalized due to an incarcerated hernia and had to undergo urgent surgical correction with bowel resection.    History 2/28/2024:  Jessica returns here today for follow-up evaluation of her right shoulder.  She has rotator cuff tear arthropathy and has been treating her symptoms subjectively.  She was last given a subacromial injection in November which gave her temporary relief.  She complains today of having recurrent pain and is requesting a repeat injection.    History 11/29/2023:  Jessica returns to clinic today for follow-up of right shoulder pain.  She wants to discuss options for her right shoulder.  She has had a few significant events occurred recently.  She was initially booked for right reverse total shoulder arthroplasty but secondary to anxiety did not want to proceed with the surgery.  She subsequently had a fall and sustained a left hip fracture which required a left total hip arthroplasty.  Her primary complaint is still pain in the shoulder but she still has good active range of motion of the right shoulder.  She wants to discuss other minimally invasive options other than a reverse total shoulder replacement.  She wants to discuss a balloon procedure as an option.    History 7/24/2023:  Jessica returns here today for follow-up evaluation of her right shoulder.  She has been attending physical therapy but continues to have constant right shoulder pain with limited range of motion and weakness.  Of note, she is still recovering from a recent total hip arthroplasty due to  femoral neck fracture.    History 5/1/2023:   74 y.o. Female with a history of right shoulder pain who is referred today by Armani Cai PA-C. She is retired and she states she has been in physical therapy for a while for her knee and her shoulder. That has taken up most of her time. She has had a fall. She also had a incident a few months ago where she lifted herself up from a low toilet and she felt a pop in the shoulder. Armani gave her a CSI and she had some relief.         History 4/10/2023:  Jessica returns here today for follow-up evaluation of right shoulder.  She has been undergoing physical therapy for her large rotator cuff tear and glenohumeral joint osteoarthritis.  There has been improvement in her pain and function since her last visit.  She is still having difficulty performing simple ADLs such as cooking and trouble lifting objects overhead, but overall she seems to be very happy with her progress.    History 02/28/2023:  Jessica returns here today for follow-up of her right shoulder.  She has a large rotator cuff tear and underlying osteoarthritis.  She has previously failed a subacromial corticosteroid injection and we are attempting to manage her symptoms conservatively.  She was only able to attend 1 visit with physical therapy due to having uncontrollable epistaxis and having to go to the emergency room and ENT several times over the last 2 weeks.  She complains of having significant right shoulder pain and stiffness.    History 02/06/2023:  Jessica returns here today for follow-up evaluation of her right shoulder.  She was given a subacromial corticosteroid injection at her last visit.  While attempting to lift herself up from a restroom toilet, she felt a pop in her shoulder and had immediate discomfort.  She was seen in the emergency room for this.  A CT arthrogram was ordered for further evaluation of her rotator cuff.  She is here today to discuss the results.  She has been using a shoulder  sling intermittently for pain relief.    History 01/23/2023:  Jessica returns here today for follow-up evaluation of her right shoulder.  She states that the Medrol Dosepak gave her very minimal relief.  She has had worsening pain and weakness since her last visit.  She is having a lot of difficulty getting dressed and sleeping at night.  She has been taking Tylenol p.m. at night which has helped her get some sleep.    Of note, she states that her knee pain has resolved following the Orthovisc series.    History 01/10/2023:  72 y.o. Female with a history of right shoulder pain who began having pain 1 week ago.  She denies having any precipitating injury or trauma but states that she was moving a lot of boxes which may have irritated her shoulder.  She is having a lot of discomfort with movement and at night while sleeping.  She has had difficulty getting dressed due to the pain and is frequently dropping objects due to weakness.        Is affecting ADLs.  Pain is 7/10 at it's worst.      REVIEW OF SYSTEMS   Constitution: Negative. Negative for chills, fever and night sweats.   HENT: Negative for congestion and headaches.    Eyes: Negative for blurred vision, left vision loss and right vision loss.   Cardiovascular: Negative for chest pain and syncope.   Respiratory: Negative for cough and shortness of breath.    Endocrine: Negative for polydipsia, polyphagia and polyuria.   Hematologic/Lymphatic: Negative for bleeding problem. Does not bruise/bleed easily.   Skin: Negative for dry skin, itching and rash.   Musculoskeletal: Negative for falls. Positive for right shoulder pain  Gastrointestinal: Negative for abdominal pain and bowel incontinence.   Genitourinary: Negative for bladder incontinence and nocturia.   Neurological: Negative for disturbances in coordination, loss of balance and seizures.   Psychiatric/Behavioral: Negative for depression. The patient does not have insomnia.    Allergic/Immunologic: Negative  "for hives and persistent infections.     PHYSICAL EXAMINATION    Vitals: /71   Pulse 84   Ht 5' 3" (1.6 m)   Wt 49 kg (108 lb 0.4 oz)   BMI 19.14 kg/m²     General: The patient appears active and healthy with no apparent physical problems.  No disturbance of mood or affect is demonstrated. Alert and Oriented.    HEENT: Eyes normal, pupils equally round, nose normal.    Resp: Equal and symmetrical chest rises. No wheezing    CV: Regular rate    Neck: Supple; nonpainful range of motion.    Extremities: no cyanosis, clubbing, edema, or diffuse swelling.  Palpable pulses, good capillary refill of the digits.  No coolness, discoloration, edema or obvious varicosities.    Skin: no lesions noted.    Lymphatic: no detected adenopathy in the upper or lower extremities.    Neurologic: normal mental status, normal reflexes, normal gait and balance.  Patient is alert and oriented to person, place and time.  No flaccidity or spasticity is noted.  No motor or sensory deficits are noted.  Light touch is intact    Orthopaedic:     SHOULDER EXAM - RIGHT    Inspection:   Normal skin color and appearance with no scars.  No muscle atrophy noted.  No scapular winging.      Palpation: No tenderness of the acromioclavicular joint, lateral edge of the acromion, biceps tendon, trapezius muscle or scapulothoracic bursa.  Crepitus of the glenohumeral joint    ROM:      PROM:     FE - 150°    Abd/ER -  80°  Abd/IR -  30°   Add/ER -  40°     AROM:    FE - 150°  with pain beyond 90°.  Abd/ER -  80°  Abd/IR -  30°   Add/ER -  40°  *pain resisted ER       Tests:     - Bonilla, - Neer's, - Cross Arm Adduction, - Camas, - Yerguson, - Speed. - Belly Press,  + Jobes, - Lift Off    Stability: - sulcus, - apprehension, - relocation, - load and shift, - DLS      Motor:  Rotator cuff strength is 4/5 supraspinatus, 4/5 infraspinatus, 5/5 subscapularis. Biceps, triceps and deltoid strength is 5/5.      Neuro     Distally there are no " paresthesias, and sensation is intact to light touch in the median, ulnar, and radial distributions.  Reflexes are 2/2 biceps, triceps and brachioradialis.        IMAGING    CT Arthrogram Shoulder Right  Order: [id number]  Status: Final result     Visible to patient: Yes (seen)     Next appt: 07/26/2023 at 11:00 AM in Radiology ([facility] D[facility])     Dx: Acute pain of right shoulder     0 Result Notes  Details    Reading Physician Reading Date Result Priority   Musa Maloney MD  [phone] 2/3/2023 Routine     Narrative & Impression  EXAMINATION:  CT ARTHROGRAM SHOULDER RIGHT     CLINICAL HISTORY:  Shoulder pain, rotator cuff disorder suspected, xray done;  Pain in right shoulder     TECHNIQUE:  Routine right multiplanar CT arthrogram shoulder performed after the intra-articular injection of contrast.     COMPARISON:  None     FINDINGS:  Rotator cuff: There is a large full-thickness rotator cuff tear involving the entire supra and infraspinatus tendons.  There is tendon retraction to the glenoid rim.  The teres minor and subscapularis are intact.  There is mild volume loss of both supra and infraspinatus muscles.  High-riding humeral head noted abutting the acromion.     Mild AC joint arthropathy.     The biceps tendon not identified, probably torn.     Generalized cartilage thinning/joint space narrowing noted with mild osteophytosis along the inferior margin of the humeral head.  No fractures or marrow replacement process.  No evidence of osteomyelitis.  No axillary lymphadenopathy.  Emphysematous changes noted in the right lung apex.     Impression:     Large full-thickness rotator cuff tear, involving the infraspinatus and supraspinatus tendons, as above.     This report was flagged in Epic as abnormal.        Electronically signed by: Musa Maloney MD  Date:                                            02/03/2023  Time:                                           13:22           Exam Ended: 02/03/23 12:33 Last  Resulted: 02/03/23 13:22           X-Ray Shoulder Complete 2 View Right  Order: [id number]  Status: Final result     Visible to patient: Yes (seen)     Next appt: 07/26/2023 at 11:00 AM in Radiology ([facility])     Dx: Right shoulder pain     0 Result Notes  Details    Reading Physician Reading Date Result Priority   Myrna Hameed MD  [phone] 1/27/2023 STAT     Narrative & Impression  EXAMINATION:  XR SHOULDER COMPLETE 2 OR MORE VIEWS RIGHT     CLINICAL HISTORY:  Pain in right shoulder     TECHNIQUE:  Two views of the right shoulder were performed.     COMPARISON:  01/10/2023.     FINDINGS:  No evidence of acute displaced fracture, dislocation, or osseous destructive process.  Suspected remote healed fracture deformity is seen is seen of the right humeral head and neck.  Mild degenerative changes are seen at the glenohumeral and acromioclavicular joints.     Impression:     No acute osseous abnormality identified.        Electronically signed by: Myrna Hameed MD  Date:                                            01/27/2023  Time:                                           17:41           Exam Ended: 01/27/23 17:33 Last Resulted: 01/27/23 17:41               IMPRESSION       ICD-10-CM ICD-9-CM   1. Traumatic complete tear of right rotator cuff, initial encounter  S46.011A 840.4   2. Localized primary osteoarthritis of right shoulder region  M19.011 715.11           MEDICATIONS PRESCRIBED      None    RECOMMENDATIONS     Continue activity modifications  Repeat CSI of right sub-acromial bursa performed under ultrasound guidance  RTC in 3 months if needed  I advised the patient that based on her current medical comorbidities that surgery right now may not be the best option.  She had relief with a subacromial injection.  I advised her that she can continue to do this for quite some time.  I also advised her she is not a candidate for a balloon procedure.  The only definitive surgery that would help improve her  pain would be a reverse total shoulder arthroplasty.  However, I would reserve this procedure until she is medically optimized and subacromial injections have failed.        Large Joint Aspiration/Injection: R subacromial bursa    Date/Time: 9/16/2024 10:30 AM    Performed by: Armani Cai PA-C  Authorized by: Armani Cai PA-C    Consent Done?:  Yes (Verbal)  Indications:  Pain  Site marked: the procedure site was marked    Timeout: prior to procedure the correct patient, procedure, and site was verified    Prep: patient was prepped and draped in usual sterile fashion      Local anesthesia used?: Yes    Local anesthetic:  Co-phenylcaine spray    Details:  Needle Size:  22 G  Ultrasonic Guidance for needle placement?: Yes (Ultrasound guidance was used for needle localization.  Images were saved and stored for documentation.  Dynamic visualization of the needle was continuous throughout the procedure and maintained accurate placement)    Images are saved and documented.  Approach:  Lateral  Location:  Shoulder  Site:  R subacromial bursa  Medications:  2 mL BUPivacaine 0.25% (2.5 mg/ml) 0.25 % (2.5 mg/mL); 40 mg triamcinolone acetonide 40 mg/mL  Patient tolerance:  Patient tolerated the procedure well with no immediate complications      All questions were answered, pt will contact us for questions or concerns in the interim.

## 2024-09-16 NOTE — PROGRESS NOTES
GASTROENTEROLOGY CLINIC NOTE    Chief Complaint: The primary encounter diagnosis was Gastroesophageal reflux disease without esophagitis. A diagnosis of Other iron deficiency anemia was also pertinent to this visit.  Referring provider/PCP: Effie Olmedo MD    Jessica Floyd is a 74 y.o. female who is a new patient to me who presents for reflux and epigastric pain. She is accompanied by her . Patient hospitalized 12/2023 for SOB, Flu A, and COPD exacerbation.   During hospitalization had episode of chest pain with feeling of food caught in throat. Patient and  state this only happens when she is hospitalized. Does not occur at home. She takes Protonix 40mg daily.    When this occurred in hospital, she was prescribed reglan PRN and given GI cocktail which helped symptoms considerably. She had no further episodes during hospitalization and has not had any since discharge.   She has no complaints at this time. Appetite has improved since discharge and weight is improving.     Reflux: No. Controlled with daily Protonix.  Dysphagia/Odynophagia: No  Nausea/Vomiting: No  Appetite Changes: No  Abdominal Pain: No  Bowel Habits: regular bowel movements. No change in bowel habits.  GI Bleeding: Denies hematochezia, hematemesis, melena, BRBPR, Black/tarry stool, coffee ground emesis  Unexplained Weight Loss: No   ___________________________________________    Interval Note 8/19/2024    Ms. Floyd who is known to me presents via telemedicine encounter for hospital follow up. She is accompanied by her .   Since last office visit, she has been hospitalized twice. 5/2024 prolonged hospital stay for incarcerated inguinal hernia which required exploratory lap for ischemic bowel.   During hospitalization, she underwent EGD/Colonoscopy. EGD significant for 2 cm sliding hiatal hernia. Protonix BID controlling heartburn/reflux.   Colonoscopy with looping and redundant colon; aborted due to poor prep. Diverticula  also noted.   Did well after discharge until 7/2024 when she developed abdominal pain, nausea, vomiting, and abdominal distension. CT concerning for SBO. She was managed conservatively and passed gastrograffin challenge.   Has been doing well since discharge. Bowel movements daily to every other day. She is watching her diet; taking in 1500 calories a day. Family is concerned about weight loss.     _____________________________________________    Interval Note 9/16/2024    Ms. Floyd presents to clinic for follow up. She is accompanied by her .   Protonix controlling reflux.  Doing well since last office visit. Bowel movements daily.   Weight is stable. Appetite has returned. She has no GI complaints at this time.       GLP-1s: No  NSAIDs: No  Anticoagulation or Antiplatelet: No    History of H.pylori:  H.pylori Treatment:  Prior Upper Endoscopy: 5/2024 with Dr. Syed  Impression:            - Normal esophagus.                          - 2 cm type-I sliding hiatal hernia.                          - Normal gastric body and antrum.                          - Normal examined duodenum.                          - No specimens collected.     Prior Colonoscopy:  5/2024 with Dr. Syed  Impression:            - Preparation of the colon was poor.                          - Clotted blood in the entire examined colon.                          - Diverticulosis in the sigmoid colon, in the                          descending colon and in the transverse colon.                          - The examination was otherwise normal.                          - No specimens collected.                          - Very difficult colon to maneuver with                          significant looping complicated by the presence of                          clots that could not be cleared.     Recommendation:   - I do not feel that repeat colonoscopy is in her                          best interest at this time given the difficulty of                           her colon and inability to advance to the cecum                          (risks outweigh benefits at this time). Monitor                          for signs of active bleeding and suggest repeat                          CTA for if there is evidence of bleeding.                         - No recommendation at this time regarding repeat                          colonoscopy.     Family h/o Colon Cancer: No  Family h/o Crohn's Disease or Ulcerative Colitis: No  Family h/o Celiac Sprue: No  Abdominal Surgeries: 5/17/2024 small bowel resection      Review of Systems   Constitutional:  Positive for weight loss.   HENT:  Negative for sore throat.    Eyes:  Negative for blurred vision.   Respiratory:  Negative for cough.    Cardiovascular:  Negative for chest pain.   Gastrointestinal:  Negative for abdominal pain, blood in stool, constipation, diarrhea, heartburn, melena, nausea and vomiting.   Genitourinary:  Negative for dysuria.   Musculoskeletal:  Negative for myalgias.   Skin:  Negative for rash.   Neurological:  Negative for headaches.   Endo/Heme/Allergies:  Negative for environmental allergies.   Psychiatric/Behavioral:  Negative for suicidal ideas. The patient is not nervous/anxious.        Past Medical History: has a past medical history of Allergic rhinitis, Amblyopia, Carotid stenosis, Coronary atherosclerosis, Dyslipidemia, ESBL (extended spectrum beta-lactamase) producing bacteria infection, Hypertension, Nausea and vomiting, Pulmonary emphysema, SAH (subarachnoid hemorrhage), and Subarachnoid hemorrhage due to ruptured aneurysm.    Past Surgical History: has a past surgical history that includes Anterior cruciate ligament repair (2012); Dental surgery; Cerebral aneurysm repair (1999); Tonsillectomy; Adenoidectomy; Appendectomy; Cataract extraction w/  intraocular lens implant (Right, 11/07/2022); Cataract extraction w/  intraocular lens implant (Left, 11/21/2022); Eye surgery (Bilateral); Hip  Arthroplasty (Left, 05/28/2023); Esophagogastroduodenoscopy (N/A, 5/15/2024); Colonoscopy (N/A, 5/16/2024); Diagnostic laparoscopy (N/A, 5/17/2024); laparotomy, exploratory (N/A, 5/17/2024); excision, small intestine (N/A, 5/17/2024); and repair, hernia, inguinal (Bilateral, 5/17/2024).    Family History:family history includes Alcohol abuse in her father; Aneurysm in her mother; Gout in her brother; Heart disease in her brother; Hypertension in her father and mother; Kidney disease in her brother; No Known Problems in her daughter, daughter, and daughter.    Allergies:   Review of patient's allergies indicates:   Allergen Reactions    Hydromorphone Anxiety, Other (See Comments) and Hallucinations     Severe agitation       Social History: reports that she quit smoking about 25 years ago. Her smoking use included cigarettes. She started smoking about 45 years ago. She has a 10 pack-year smoking history. She has been exposed to tobacco smoke. She has never used smokeless tobacco. She reports current alcohol use of about 7.0 standard drinks of alcohol per week. She reports that she does not use drugs.    Home medications:   Current Outpatient Medications on File Prior to Visit   Medication Sig Dispense Refill    allopurinoL (ZYLOPRIM) 100 MG tablet Take 2 tablets (200 mg total) by mouth once daily.      amLODIPine (NORVASC) 5 MG tablet Take 1 tablet (5 mg total) by mouth once daily.      atorvastatin (LIPITOR) 80 MG tablet Take 1 tablet (80 mg total) by mouth once daily. 90 tablet 3    colchicine (COLCRYS) 0.6 mg tablet Take 1 tablet (0.6 mg total) by mouth once daily. 30 tablet 8    fluticasone furoate-vilanteroL (BREO ELLIPTA) 100-25 mcg/dose diskus inhaler Inhale 1 puff into the lungs once daily. Controller      fluticasone propionate (FLONASE) 50 mcg/actuation nasal spray 2 sprays (100 mcg total) by Each Nostril route once daily.      furosemide (LASIX) 20 MG tablet Take 1 tablet (20 mg total) by mouth once daily.       hydroCHLOROthiazide (HYDRODIURIL) 25 MG tablet Take 1 tablet (25 mg total) by mouth once daily. 90 tablet 3    LIDOcaine 4 % Gel Apply 1 g topically 2 (two) times daily as needed (foot pain).      montelukast (SINGULAIR) 10 mg tablet Take 1 tablet (10 mg total) by mouth every evening. 30 tablet 6    QUEtiapine (SEROQUEL) 50 MG tablet Take 1 tablet (50 mg total) by mouth 2 (two) times daily. 60 tablet 1    syringe, disposable, 1 mL Syrg 1 Stick by Misc.(Non-Drug; Combo Route) route once a week. 25 each 1    [DISCONTINUED] pantoprazole (PROTONIX) 40 MG tablet Take 1 tablet (40 mg total) by mouth 2 (two) times daily.       Current Facility-Administered Medications on File Prior to Visit   Medication Dose Route Frequency Provider Last Rate Last Admin    mupirocin 2 % ointment   Nasal On Call Procedure Kang Diaz MD   Given at 10/25/23 1045    tranexamic acid (CYKLOKAPRON) 1,000 mg in sodium chloride 0.9 % 100 mL IVPB (MB+)  1,000 mg Intravenous Once Kang Diaz MD        tranexamic acid (CYKLOKAPRON) 1,000 mg in sodium chloride 0.9 % 100 mL IVPB (MB+)  1,000 mg Intravenous Once Kang Diaz MD        [DISCONTINUED] BUPivacaine 0.25% (2.5 mg/ml) injection 2 mL  2 mL   Armani Cai PA-C   2 mL at 09/16/24 1030    [DISCONTINUED] triamcinolone acetonide injection 40 mg  40 mg Intra-articular  Armani Cai PA-C   40 mg at 09/16/24 1030       Vital signs:  There were no vitals taken for this visit.    Physical Exam  Vitals reviewed.   Constitutional:       Appearance: Normal appearance.   HENT:      Head: Normocephalic.   Pulmonary:      Effort: Pulmonary effort is normal. No respiratory distress.   Abdominal:      General: There is no distension.      Palpations: Abdomen is soft.      Tenderness: There is no abdominal tenderness.   Musculoskeletal:      Comments: Ambulates with walker   Skin:     General: Skin is warm.   Neurological:      Mental Status: She is alert and oriented to person,  "place, and time.   Psychiatric:         Behavior: Behavior normal.         Thought Content: Thought content normal.         Routine labs:  Lab Results   Component Value Date    WBC 13.00 (H) 07/16/2024    HGB 9.8 (L) 07/16/2024    HCT 31.5 (L) 07/16/2024     (H) 07/16/2024     07/16/2024     Lab Results   Component Value Date    INR 1.0 07/04/2024     Lab Results   Component Value Date    IRON 12 (L) 07/16/2024    FERRITIN 135 07/16/2024    TIBC 332 07/16/2024    FESATURATED 4 (L) 07/16/2024     Lab Results   Component Value Date     (L) 07/16/2024    K 3.8 07/16/2024    CL 97 07/16/2024    CO2 28 07/16/2024    BUN 28 (H) 07/16/2024    CREATININE 1.3 07/16/2024     Lab Results   Component Value Date    ALBUMIN 3.1 (L) 07/16/2024    ALT 16 07/16/2024    AST 20 07/16/2024    ALKPHOS 77 07/16/2024    BILITOT 0.6 07/16/2024     No results found for: "GLUCOSE"  Lab Results   Component Value Date    TSH 0.719 10/28/2023     Lab Results   Component Value Date    CALCIUM 8.6 (L) 07/16/2024    PHOS 2.6 (L) 07/09/2024       Imaging:      I have reviewed prior labs, imaging, and notes.      Assessment:  1. Gastroesophageal reflux disease without esophagitis    2. Other iron deficiency anemia        EGD and Colonoscopy done 5/2024 without significant finding to explain anemia.   Follows with Hematology and receiving iron infusions. H/H stable.   Reflux controlled with Protonix.     Plan:  Orders Placed This Encounter    pantoprazole (PROTONIX) 40 MG tablet       Continue Protonix as previously prescribed. Refilled  Follow with hematology as scheduled  Continue daily fiber supplement such as metamucil or benefiber.     Consider referral to dietician in future.       Plan of care discussed with patient who is in agreement and verbalized understanding.     I have explained the planned procedures to the patient.The risks, benefits and alternatives of the procedure were also explained in detail. Patient " verbalized understanding, all questions were answered. The patient agrees to proceed as planned    Follow Up: RONAL Jimenez, MAREN,FNP-BC  [facility] [place]    (Portions of this note were dictated using voice recognition software and may contain dictation related errors in spelling/grammar/syntax not found on text review)

## 2024-09-17 ENCOUNTER — HOSPITAL ENCOUNTER (OUTPATIENT)
Dept: CARDIOLOGY | Facility: OTHER | Age: 74
Discharge: HOME | End: 2024-09-17
Attending: INTERNAL MEDICINE
Payer: MEDICARE

## 2024-09-17 ENCOUNTER — OFFICE VISIT (OUTPATIENT)
Dept: PODIATRY | Facility: CLINIC | Age: 74
End: 2024-09-17
Payer: MEDICARE

## 2024-09-17 VITALS — WEIGHT: 108 LBS | BODY MASS INDEX: 19.14 KG/M2 | HEIGHT: 63 IN

## 2024-09-17 DIAGNOSIS — M1A.0720 IDIOPATHIC CHRONIC GOUT OF LEFT FOOT WITHOUT TOPHUS: Primary | ICD-10-CM

## 2024-09-17 DIAGNOSIS — I65.21 STENOSIS OF RIGHT CAROTID ARTERY: ICD-10-CM

## 2024-09-17 PROCEDURE — 1101F PT FALLS ASSESS-DOCD LE1/YR: CPT | Mod: CPTII,S$GLB,, | Performed by: PODIATRIST

## 2024-09-17 PROCEDURE — 93880 EXTRACRANIAL BILAT STUDY: CPT | Mod: 26,NTX,, | Performed by: INTERNAL MEDICINE

## 2024-09-17 PROCEDURE — 93880 EXTRACRANIAL BILAT STUDY: CPT | Mod: TXP

## 2024-09-17 PROCEDURE — 99999 PR PBB SHADOW E&M-EST. PATIENT-LVL IV: CPT | Mod: PBBFAC,,, | Performed by: PODIATRIST

## 2024-09-17 PROCEDURE — 1159F MED LIST DOCD IN RCRD: CPT | Mod: CPTII,S$GLB,, | Performed by: PODIATRIST

## 2024-09-17 PROCEDURE — 1160F RVW MEDS BY RX/DR IN RCRD: CPT | Mod: CPTII,S$GLB,, | Performed by: PODIATRIST

## 2024-09-17 PROCEDURE — 3288F FALL RISK ASSESSMENT DOCD: CPT | Mod: CPTII,S$GLB,, | Performed by: PODIATRIST

## 2024-09-17 PROCEDURE — 99212 OFFICE O/P EST SF 10 MIN: CPT | Mod: S$GLB,,, | Performed by: PODIATRIST

## 2024-09-17 PROCEDURE — 1126F AMNT PAIN NOTED NONE PRSNT: CPT | Mod: CPTII,S$GLB,, | Performed by: PODIATRIST

## 2024-09-17 RX ORDER — CYANOCOBALAMIN 1000 UG/ML
1000 INJECTION, SOLUTION INTRAMUSCULAR; SUBCUTANEOUS
COMMUNITY

## 2024-09-17 RX ORDER — CELECOXIB 200 MG/1
200 CAPSULE ORAL DAILY PRN
COMMUNITY
Start: 2024-09-02

## 2024-09-17 RX ORDER — TIOTROPIUM BROMIDE INHALATION SPRAY 3.12 UG/1
SPRAY, METERED RESPIRATORY (INHALATION)
COMMUNITY
Start: 2024-09-15

## 2024-09-17 RX ORDER — LIDOCAINE AND PRILOCAINE 25; 25 MG/G; MG/G
CREAM TOPICAL
Qty: 30 G | Refills: 3 | Status: SHIPPED | OUTPATIENT
Start: 2024-09-17

## 2024-09-17 NOTE — PROGRESS NOTES
Subjective:      Patient ID: Jessica Floyd is a 74 y.o. female.    Chief Complaint: Foot Problem (Gout flare in left big toe)    Patient says she is having a gout attack of the left toe.  She is afraid it will start hurting.  It does not get hurt.  Prior medical treatment has included colchicine, steroid which helps.  She wears good shoes which are flexible inappropriate without seems over the 1st MTP area.  She denies trauma and surgery both feet.  She has recently lost her rheumatologist Dr. ABBY POWERS.    Review of Systems   Constitutional: Negative for chills, diaphoresis, fever, malaise/fatigue and night sweats.   Cardiovascular:  Negative for claudication, cyanosis, leg swelling and syncope.   Skin:  Negative for color change, dry skin, nail changes, rash, suspicious lesions and unusual hair distribution.   Musculoskeletal:  Positive for arthritis, gout and joint pain. Negative for falls, joint swelling, muscle cramps, muscle weakness and stiffness.   Gastrointestinal:  Negative for constipation, diarrhea, nausea and vomiting.   Neurological:  Negative for brief paralysis, disturbances in coordination, focal weakness, numbness, paresthesias, sensory change and tremors.         Objective:      Physical Exam  Constitutional:       General: She is not in acute distress.     Appearance: She is well-developed. She is not diaphoretic.   Cardiovascular:      Pulses:           Popliteal pulses are 2+ on the right side and 2+ on the left side.        Dorsalis pedis pulses are 2+ on the right side and 2+ on the left side.        Posterior tibial pulses are 2+ on the right side and 2+ on the left side.      Comments: Capillary refill 3 seconds all toes/distal feet, all toes/both feet warm to touch.      Negative lymphadenopathy bilateral popliteal fossa and tarsal tunnel.      Negavie lower extremity edema bilateral.    Musculoskeletal:      Right ankle: No swelling, deformity, ecchymosis or lacerations. Normal range of  motion. Normal pulse.      Right Achilles Tendon: Normal. No defects. Lopez's test negative.      Comments: Patient has hammertoes 2 through 5 bilateral without symptoms today.      No pain to palpation range motion stability unloading 1st  MTPJ right and left   Lymphadenopathy:      Lower Body: No right inguinal adenopathy. No left inguinal adenopathy.      Comments: Negative lymphadenopathy bilateral popliteal fossa and tarsal tunnel.    Negative lymphangitic streaking bilateral feet/ankles/legs.   Skin:     General: Skin is warm and dry.      Capillary Refill: Capillary refill takes 2 to 3 seconds.      Coloration: Skin is not pale.      Findings: No abrasion, bruising, burn, ecchymosis, erythema, laceration, lesion or rash.      Nails: There is no clubbing.      Comments:   Skin thin, shiny, atrophic, with decreased density and distribution of pedal hair bilateral, but without hyperpigmentation, kirti discoloration,  ulcers, masses, nodules or cords palpated bilateral feet and legs.    Neurological:      Mental Status: She is alert and oriented to person, place, and time.      Sensory: No sensory deficit.      Motor: No tremor, atrophy or abnormal muscle tone.      Gait: Gait normal.   Psychiatric:         Behavior: Behavior is cooperative.           Assessment:       Encounter Diagnosis   Name Primary?    Idiopathic chronic gout of left foot without tophus Yes         Plan:       Jessica was seen today for foot problem.    Diagnoses and all orders for this visit:    Idiopathic chronic gout of left foot without tophus  -     Ambulatory referral/consult to Rheumatology; Future    Other orders  -     LIDOcaine-prilocaine (EMLA) cream; Apply topically as needed.      I counseled the patient on her conditions, their implications and medical management.        EMLA topically once nightly p.r.n. for gout pain.      Consult Rheumatology-global management chronic crystal and deposition disease.      Patient returned  to clinic if acutely painful or new foot symptom arises.      Continue function to tolerance and shoes of choice.          No follow-ups on file.

## 2024-09-18 ENCOUNTER — PATIENT MESSAGE (OUTPATIENT)
Dept: FAMILY MEDICINE | Facility: CLINIC | Age: 74
End: 2024-09-18
Payer: MEDICARE

## 2024-09-18 LAB
LEFT ARM DIASTOLIC BLOOD PRESSURE: 63 MMHG
LEFT ARM SYSTOLIC BLOOD PRESSURE: 127 MMHG
LEFT CBA DIAS: 9 CM/S
LEFT CBA SYS: 27 CM/S
LEFT CCA DIST DIAS: 12 CM/S
LEFT CCA DIST SYS: 46 CM/S
LEFT CCA MID DIAS: 13 CM/S
LEFT CCA MID SYS: 52 CM/S
LEFT CCA PROX DIAS: 9 CM/S
LEFT CCA PROX SYS: 25 CM/S
LEFT ECA DIAS: 4 CM/S
LEFT ECA SYS: 32 CM/S
LEFT ICA DIST DIAS: 20 CM/S
LEFT ICA DIST SYS: 75 CM/S
LEFT ICA MID DIAS: 5 CM/S
LEFT ICA MID SYS: 33 CM/S
LEFT ICA PROX DIAS: 11 CM/S
LEFT ICA PROX SYS: 43 CM/S
LEFT VERTEBRAL DIAS: 9 CM/S
LEFT VERTEBRAL SYS: 36 CM/S
OHS CV CAROTID RIGHT ICA EDV HIGHEST: 23
OHS CV CAROTID ULTRASOUND LEFT ICA/CCA RATIO: 1.63
OHS CV CAROTID ULTRASOUND RIGHT ICA/CCA RATIO: 3.38
OHS CV PV CAROTID LEFT HIGHEST CCA: 52
OHS CV PV CAROTID LEFT HIGHEST ICA: 75
OHS CV PV CAROTID RIGHT HIGHEST CCA: 35
OHS CV PV CAROTID RIGHT HIGHEST ICA: 81
OHS CV US CAROTID LEFT HIGHEST EDV: 20
RIGHT ARM DIASTOLIC BLOOD PRESSURE: 70 MMHG
RIGHT ARM SYSTOLIC BLOOD PRESSURE: 130 MMHG
RIGHT CBA DIAS: 9 CM/S
RIGHT CBA SYS: 54 CM/S
RIGHT CCA DIST DIAS: 7 CM/S
RIGHT CCA DIST SYS: 24 CM/S
RIGHT CCA MID DIAS: 8 CM/S
RIGHT CCA MID SYS: 32 CM/S
RIGHT CCA PROX DIAS: 8 CM/S
RIGHT CCA PROX SYS: 35 CM/S
RIGHT ECA DIAS: 7 CM/S
RIGHT ECA SYS: 47 CM/S
RIGHT ICA DIST DIAS: 14 CM/S
RIGHT ICA DIST SYS: 53 CM/S
RIGHT ICA MID DIAS: 23 CM/S
RIGHT ICA MID SYS: 81 CM/S
RIGHT ICA PROX DIAS: 16 CM/S
RIGHT ICA PROX SYS: 50 CM/S
RIGHT VERTEBRAL DIAS: 15 CM/S
RIGHT VERTEBRAL SYS: 49 CM/S

## 2024-09-23 ENCOUNTER — PATIENT MESSAGE (OUTPATIENT)
Dept: TRANSPLANT | Facility: CLINIC | Age: 74
End: 2024-09-23
Payer: MEDICARE

## 2024-09-25 ENCOUNTER — LAB VISIT (OUTPATIENT)
Dept: LAB | Facility: OTHER | Age: 74
End: 2024-09-25
Attending: INTERNAL MEDICINE
Payer: MEDICARE

## 2024-09-25 DIAGNOSIS — D53.9 MACROCYTIC ANEMIA: ICD-10-CM

## 2024-09-25 LAB
ALBUMIN SERPL BCP-MCNC: 3.6 G/DL (ref 3.5–5.2)
ALP SERPL-CCNC: 84 U/L (ref 55–135)
ALT SERPL W/O P-5'-P-CCNC: 14 U/L (ref 10–44)
ANION GAP SERPL CALC-SCNC: 11 MMOL/L (ref 8–16)
ANISOCYTOSIS BLD QL SMEAR: SLIGHT
AST SERPL-CCNC: 19 U/L (ref 10–40)
BASOPHILS NFR BLD: 0 % (ref 0–1.9)
BILIRUB SERPL-MCNC: 0.7 MG/DL (ref 0.1–1)
BUN SERPL-MCNC: 25 MG/DL (ref 8–23)
CALCIUM SERPL-MCNC: 9.5 MG/DL (ref 8.7–10.5)
CHLORIDE SERPL-SCNC: 105 MMOL/L (ref 95–110)
CO2 SERPL-SCNC: 23 MMOL/L (ref 23–29)
CREAT SERPL-MCNC: 1 MG/DL (ref 0.5–1.4)
DIFFERENTIAL METHOD BLD: ABNORMAL
EOSINOPHIL NFR BLD: 3 % (ref 0–8)
ERYTHROCYTE [DISTWIDTH] IN BLOOD BY AUTOMATED COUNT: 21.5 % (ref 11.5–14.5)
EST. GFR  (NO RACE VARIABLE): 59 ML/MIN/1.73 M^2
FERRITIN SERPL-MCNC: 241 NG/ML (ref 20–300)
FOLATE SERPL-MCNC: >40 NG/ML (ref 4–24)
GLUCOSE SERPL-MCNC: 85 MG/DL (ref 70–110)
HCT VFR BLD AUTO: 39.3 % (ref 37–48.5)
HGB BLD-MCNC: 12 G/DL (ref 12–16)
IGA SERPL-MCNC: 277 MG/DL (ref 40–350)
IGG SERPL-MCNC: 2146 MG/DL (ref 650–1600)
IGM SERPL-MCNC: 279 MG/DL (ref 50–300)
IMM GRANULOCYTES # BLD AUTO: ABNORMAL K/UL (ref 0–0.04)
IMM GRANULOCYTES NFR BLD AUTO: ABNORMAL % (ref 0–0.5)
IRON SERPL-MCNC: 66 UG/DL (ref 30–160)
LYMPHOCYTES NFR BLD: 9 % (ref 18–48)
MCH RBC QN AUTO: 32.4 PG (ref 27–31)
MCHC RBC AUTO-ENTMCNC: 30.5 G/DL (ref 32–36)
MCV RBC AUTO: 106 FL (ref 82–98)
MONOCYTES NFR BLD: 14 % (ref 4–15)
NEUTROPHILS NFR BLD: 74 % (ref 38–73)
NRBC BLD-RTO: 0 /100 WBC
PATH REV BLD -IMP: NORMAL
PATH REV BLD -IMP: NORMAL
PLATELET # BLD AUTO: 370 K/UL (ref 150–450)
PLATELET BLD QL SMEAR: ABNORMAL
PMV BLD AUTO: 9.9 FL (ref 9.2–12.9)
POIKILOCYTOSIS BLD QL SMEAR: SLIGHT
POTASSIUM SERPL-SCNC: 4.2 MMOL/L (ref 3.5–5.1)
PROT SERPL-MCNC: 8.4 G/DL (ref 6–8.4)
RBC # BLD AUTO: 3.7 M/UL (ref 4–5.4)
SATURATED IRON: 19 % (ref 20–50)
SCHISTOCYTES BLD QL SMEAR: ABNORMAL
SODIUM SERPL-SCNC: 139 MMOL/L (ref 136–145)
STOMATOCYTES BLD QL SMEAR: PRESENT
TOTAL IRON BINDING CAPACITY: 354 UG/DL (ref 250–450)
TOXIC GRANULES BLD QL SMEAR: PRESENT
TRANSFERRIN SERPL-MCNC: 239 MG/DL (ref 200–375)
WBC # BLD AUTO: 22.49 K/UL (ref 3.9–12.7)

## 2024-09-25 PROCEDURE — 82728 ASSAY OF FERRITIN: CPT | Mod: NTX | Performed by: INTERNAL MEDICINE

## 2024-09-25 PROCEDURE — 84165 PROTEIN E-PHORESIS SERUM: CPT | Mod: NTX | Performed by: INTERNAL MEDICINE

## 2024-09-25 PROCEDURE — 84165 PROTEIN E-PHORESIS SERUM: CPT | Mod: 26,NTX,, | Performed by: PATHOLOGY

## 2024-09-25 PROCEDURE — 86334 IMMUNOFIX E-PHORESIS SERUM: CPT | Mod: NTX | Performed by: INTERNAL MEDICINE

## 2024-09-25 PROCEDURE — 85025 COMPLETE CBC W/AUTO DIFF WBC: CPT | Mod: TXP | Performed by: INTERNAL MEDICINE

## 2024-09-25 PROCEDURE — 82746 ASSAY OF FOLIC ACID SERUM: CPT | Mod: TXP | Performed by: INTERNAL MEDICINE

## 2024-09-25 PROCEDURE — 83921 ORGANIC ACID SINGLE QUANT: CPT | Mod: TXP | Performed by: INTERNAL MEDICINE

## 2024-09-25 PROCEDURE — 80053 COMPREHEN METABOLIC PANEL: CPT | Mod: TXP | Performed by: INTERNAL MEDICINE

## 2024-09-25 PROCEDURE — 83521 IG LIGHT CHAINS FREE EACH: CPT | Mod: NTX | Performed by: INTERNAL MEDICINE

## 2024-09-25 PROCEDURE — 84466 ASSAY OF TRANSFERRIN: CPT | Mod: TXP | Performed by: INTERNAL MEDICINE

## 2024-09-25 PROCEDURE — 85060 BLOOD SMEAR INTERPRETATION: CPT | Mod: NTX,,, | Performed by: STUDENT IN AN ORGANIZED HEALTH CARE EDUCATION/TRAINING PROGRAM

## 2024-09-25 PROCEDURE — 82784 ASSAY IGA/IGD/IGG/IGM EACH: CPT | Mod: 59,TXP | Performed by: INTERNAL MEDICINE

## 2024-09-25 PROCEDURE — 86334 IMMUNOFIX E-PHORESIS SERUM: CPT | Mod: 26,NTX,, | Performed by: PATHOLOGY

## 2024-09-25 PROCEDURE — 84238 ASSAY NONENDOCRINE RECEPTOR: CPT | Mod: NTX | Performed by: INTERNAL MEDICINE

## 2024-09-26 ENCOUNTER — PATIENT MESSAGE (OUTPATIENT)
Dept: INTERNAL MEDICINE | Facility: CLINIC | Age: 74
End: 2024-09-26
Payer: MEDICARE

## 2024-09-26 LAB
ALBUMIN SERPL ELPH-MCNC: 3.85 G/DL (ref 3.35–5.55)
ALPHA1 GLOB SERPL ELPH-MCNC: 0.37 G/DL (ref 0.17–0.41)
ALPHA2 GLOB SERPL ELPH-MCNC: 0.8 G/DL (ref 0.43–0.99)
B-GLOBULIN SERPL ELPH-MCNC: 0.83 G/DL (ref 0.5–1.1)
GAMMA GLOB SERPL ELPH-MCNC: 2.15 G/DL (ref 0.67–1.58)
INTERPRETATION SERPL IFE-IMP: NORMAL
KAPPA LC SER QL IA: 9.5 MG/DL (ref 0.33–1.94)
KAPPA LC/LAMBDA SER IA: 1.12 (ref 0.26–1.65)
LAMBDA LC SER QL IA: 8.52 MG/DL (ref 0.57–2.63)
PROT SERPL-MCNC: 8 G/DL (ref 6–8.4)

## 2024-09-27 LAB — STFR SERPL-MCNC: 5.4 MG/L (ref 1.8–4.6)

## 2024-09-29 LAB
PATHOLOGIST INTERPRETATION IFE: NORMAL
PATHOLOGIST INTERPRETATION SPE: NORMAL

## 2024-10-01 LAB — METHYLMALONATE SERPL-SCNC: 0.3 UMOL/L

## 2024-10-02 ENCOUNTER — OFFICE VISIT (OUTPATIENT)
Dept: HEMATOLOGY/ONCOLOGY | Facility: CLINIC | Age: 74
End: 2024-10-02
Payer: MEDICARE

## 2024-10-02 VITALS
WEIGHT: 108.69 LBS | BODY MASS INDEX: 19.26 KG/M2 | HEIGHT: 63 IN | RESPIRATION RATE: 18 BRPM | SYSTOLIC BLOOD PRESSURE: 121 MMHG | HEART RATE: 72 BPM | TEMPERATURE: 99 F | OXYGEN SATURATION: 95 % | DIASTOLIC BLOOD PRESSURE: 68 MMHG

## 2024-10-02 DIAGNOSIS — D72.825 BANDEMIA: ICD-10-CM

## 2024-10-02 DIAGNOSIS — E53.8 VITAMIN B12 DEFICIENCY: ICD-10-CM

## 2024-10-02 DIAGNOSIS — D53.9 MACROCYTIC ANEMIA: Primary | ICD-10-CM

## 2024-10-02 DIAGNOSIS — D50.0 IRON DEFICIENCY ANEMIA SECONDARY TO BLOOD LOSS (CHRONIC): ICD-10-CM

## 2024-10-02 PROCEDURE — 1126F AMNT PAIN NOTED NONE PRSNT: CPT | Mod: CPTII,S$GLB,, | Performed by: INTERNAL MEDICINE

## 2024-10-02 PROCEDURE — 3288F FALL RISK ASSESSMENT DOCD: CPT | Mod: CPTII,S$GLB,, | Performed by: INTERNAL MEDICINE

## 2024-10-02 PROCEDURE — 99999 PR PBB SHADOW E&M-EST. PATIENT-LVL IV: CPT | Mod: PBBFAC,,, | Performed by: INTERNAL MEDICINE

## 2024-10-02 PROCEDURE — G2211 COMPLEX E/M VISIT ADD ON: HCPCS | Mod: S$GLB,,, | Performed by: INTERNAL MEDICINE

## 2024-10-02 PROCEDURE — 1159F MED LIST DOCD IN RCRD: CPT | Mod: CPTII,S$GLB,, | Performed by: INTERNAL MEDICINE

## 2024-10-02 PROCEDURE — 3074F SYST BP LT 130 MM HG: CPT | Mod: CPTII,S$GLB,, | Performed by: INTERNAL MEDICINE

## 2024-10-02 PROCEDURE — 3078F DIAST BP <80 MM HG: CPT | Mod: CPTII,S$GLB,, | Performed by: INTERNAL MEDICINE

## 2024-10-02 PROCEDURE — 1101F PT FALLS ASSESS-DOCD LE1/YR: CPT | Mod: CPTII,S$GLB,, | Performed by: INTERNAL MEDICINE

## 2024-10-02 PROCEDURE — 99214 OFFICE O/P EST MOD 30 MIN: CPT | Mod: S$GLB,,, | Performed by: INTERNAL MEDICINE

## 2024-10-02 NOTE — PROGRESS NOTES
[facility]     Outpatient Hematology/Medical Oncology Note  Patient: Jessica Floyd  MRN: [id number]  Primary Care Provider: Effie Olmedo MD  Chief Complaint: Macrocytic Anemia     Subjective     Subjective:   HPI:   Jessica Floyd is a 74 y.o. 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 She presents today for follow up.     HPI:  Feb 2023: presented to the ED with significant epistaxis  April 2023 :ENT - diagnosed with chronic maxillary sinusitis and nasal polyposis  March 2023: first appointment with Hematology for new onset ERIK of unclear cause   Nov and Dec 2023: admissions for COVID and influenza with subsequent COPD exacerbations   5/14 - 6/3/2024: presented with acute GI bleed   5/14/24: CTA: acute bleed at the level of the cecum - incidental findings: severe plaque burden at bilateral common iliac artery and bilateral common femoral artery   5/15/24: EGD: 2 cm hiatal hernia, otherwise unremarkable; 5/16/24: Colonoscopy: poor prep - clotted blood in entire colon; unable to visualize colon  5/14/24: 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  5/17/24: Given continued pain after the embolization, she underwent exploratory laparotomy with findings of necrotic small bowel in incarcerated left inguinal hernia s/p small bowel resection and tissue repair of inguinal hernias (through the same segment of bowel  that had coils in the vasculature)    Hematology History:  10/2022: first noted to be anemic - Hgb Range: 8.5 - 12 range, MCV: 105, WBC and platelets: wnl (WBC elevated in setting of recent viral illnesses, anemia nadiring after L THR and hospitalizations for COVID, flu, CAP, ER visits for significant epistaxis)  Treatment: IV Iron - (venofer): May 2023 (, 5/15, , , ), 2023 (, , 10/6), May 2024 (5/10/24)  Labs: 2024: WBC: 14, Hgb: 10.3, MCV: 110, platelets: 387, WBC: 14 (ANC: 8500), TSAT: 10%, Ferritin: 24 (2023) to 49 (2023), 611 (2023), now 69,  folate : 14, B12: 586, MMA: 1.72, retic: 7.6, smear: unremarkable (moderate anisopoikilocytosis, leukocytosis w/ reactive changes), Cr: 0.8, GFR: 60, LFTs: wnl, KF.5, LFLC: 3.99, Ratio: wnl, HIV and hepatitis testing: negative, haptoglobin: 264 (2023), retic: 4 (2023)   2024 Labs: CBC: WBC: 13, Hgb: 9.8, MCV: 100, platelets: 302, monocytes: 23% (3100),CMP: unremarkable - Cr: 1.3, GFR: 43, TSAT: 4%, STFR: 7.4, Ferritn: 135, B12: 1830, MMA: 0.46 (1.72), copper: 1445, zinc: 40, SPEP/RADHA: wnl, k flc; 9.68, L FLC: 10,67, Ratio: 0.92    Interval History:    Ms. Floyd is accompanied today by her . She had labs done and presents for follow up. IV iron last 2024 and tolerated well. No recent hospitalization. No GIB      Review of Systems:  14-point review of systems was asked with the pertinent positives and/or negatives stated in HPI.     Past Medical History:   Nonobstructive CAD, HLD, HTN (HCTZ)/CKD, COPD/PH on TTE (on 2L home )2 after COVID in 2023), HFpEF, SAH (/ to ruptured aneurysm in ), Right Carotid artery stent for complication (iatrogenic rupture during cerebral angiography), Gout, R Rotator Cuff Tear, GERD,     Past Surgical History:   - Incarcerated inguinal hernia s/p necrotic small bowel resection (24)  - SAH due to ruptured aneurysm ( s/p clipping) c/b right carotid artery  dissection requiring a stent, Left femur fracture after mechanical fall s/p L THR (5/2023)    Past Surgical History:   Procedure Laterality Date    ADENOIDECTOMY      ANTERIOR CRUCIATE LIGAMENT REPAIR  2012    APPENDECTOMY      CATARACT EXTRACTION W/  INTRAOCULAR LENS IMPLANT Right 11/07/2022    Procedure: EXTRACTION, CATARACT, WITH IOL INSERTION;  Surgeon: Higinio Cristina MD;  Location: [facility];  Service: Ophthalmology;  Laterality: Right;    CATARACT EXTRACTION W/  INTRAOCULAR LENS IMPLANT Left 11/21/2022    Procedure: EXTRACTION, CATARACT, WITH IOL INSERTION;  Surgeon: Higinio Cristina MD;  Location: [facility] OR;  Service: Ophthalmology;  Laterality: Left;    CEREBRAL ANEURYSM REPAIR  1999    clip    COLONOSCOPY N/A 5/16/2024    Procedure: COLONOSCOPY;  Surgeon: Rich Syed MD;  Location: [facility] (2ND FLR);  Service: Endoscopy;  Laterality: N/A;    DENTAL SURGERY      DIAGNOSTIC LAPAROSCOPY N/A 5/17/2024    Procedure: LAPAROSCOPY, DIAGNOSTIC;  Surgeon: Ruben Oscar MD;  [facility];  Service: General;  Laterality: N/A;    ESOPHAGOGASTRODUODENOSCOPY N/A 5/15/2024    Procedure: EGD (ESOPHAGOGASTRODUODENOSCOPY);  Surgeon: Rich Syed MD;  Location: [facility] (2ND FLR);  Service: Endoscopy;  Laterality: N/A;    EXCISION, SMALL INTESTINE N/A 5/17/2024    Procedure: EXCISION, SMALL INTESTINE;  Surgeon: Ruben Oscar MD;  [facility];  Service: General;  Laterality: N/A;    EYE SURGERY Bilateral     cataract removal and lens implants    HIP ARTHROPLASTY Left 05/28/2023    Procedure: TOTAL HIP ARTHROPLASTY;  Surgeon: Juan Wan MD;  [facility];  Service: Orthopedics;  Laterality: Left;    LAPAROTOMY, EXPLORATORY N/A 5/17/2024    Procedure: LAPAROTOMY, EXPLORATORY;  Surgeon: Ruben Oscar MD;  [facility];  Service: General;  Laterality: N/A;    REPAIR, HERNIA, INGUINAL Bilateral 5/17/2024    Procedure: REPAIR, HERNIA,  INGUINAL;  Surgeon: Ruben Oscar MD;  Location: [facility] OR [address] [place] [address];  Service: General;  Laterality: Bilateral;    TONSILLECTOMY       Family History:   - No known family history of cancer     Social History:   - originally from the [place], 3 daughters (1 daughter in [facility], 1 in [place] and 1 in [place]) and 4 granddaughters   - previously lived in [place]   - Smoking History: Former, quit in her 50s, started in her 20s, about 2 cigarettes per day  - Alcohol: socially, red wine   - Illicit Drug Use: denies    reports that she quit smoking about 25 years ago. Her smoking use included cigarettes. She started smoking about 45 years ago. She has a 10 pack-year smoking history. She has been exposed to tobacco smoke. She has never used smokeless tobacco. She reports current alcohol use of about 7.0 standard drinks of alcohol per week. She reports that she does not use drugs.    Allergies:  Review of patient's allergies indicates:   Allergen Reactions    Hydromorphone Anxiety, Other (See Comments) and Hallucinations     Severe agitation       Medications:  Current Outpatient Medications   Medication Sig Dispense Refill    allopurinoL (ZYLOPRIM) 100 MG tablet Take 2 tablets (200 mg total) by mouth once daily.      amLODIPine (NORVASC) 5 MG tablet Take 1 tablet (5 mg total) by mouth once daily.      atorvastatin (LIPITOR) 80 MG tablet Take 1 tablet (80 mg total) by mouth once daily. 90 tablet 3    celecoxib (CELEBREX) 200 MG capsule Take 200 mg by mouth daily as needed.      colchicine (COLCRYS) 0.6 mg tablet Take 1 tablet (0.6 mg total) by mouth once daily. 30 tablet 8    cyanocobalamin 1,000 mcg/mL injection Inject 1,000 mcg into the muscle every 7 days.      fluticasone furoate-vilanteroL (BREO ELLIPTA) 100-25 mcg/dose diskus inhaler Inhale 1 puff into the lungs once daily. Controller      fluticasone propionate (FLONASE) 50 mcg/actuation nasal spray 2 sprays (100 mcg total) by Each Nostril route once daily.       "hydroCHLOROthiazide (HYDRODIURIL) 25 MG tablet Take 1 tablet (25 mg total) by mouth once daily. 90 tablet 3    LIDOcaine 4 % Gel Apply 1 g topically 2 (two) times daily as needed (foot pain).      LIDOcaine-prilocaine (EMLA) cream Apply topically as needed. 30 g 3    montelukast (SINGULAIR) 10 mg tablet Take 1 tablet (10 mg total) by mouth every evening. 30 tablet 6    pantoprazole (PROTONIX) 40 MG tablet Take 1 tablet (40 mg total) by mouth 2 (two) times daily. 180 tablet 3    QUEtiapine (SEROQUEL) 50 MG tablet Take 1 tablet (50 mg total) by mouth 2 (two) times daily. 60 tablet 1    SPIRIVA RESPIMAT 2.5 mcg/actuation inhaler SMARTSI Puff(s) Via Inhaler Daily      syringe, disposable, 1 mL Syrg 1 Stick by Misc.(Non-Drug; Combo Route) route once a week. 25 each 1    furosemide (LASIX) 20 MG tablet Take 1 tablet (20 mg total) by mouth once daily. (Patient not taking: Reported on 10/2/2024)       No current facility-administered medications for this visit.     Facility-Administered Medications Ordered in Other Visits   Medication Dose Route Frequency Provider Last Rate Last Admin    mupirocin 2 % ointment   Nasal On Call Procedure Kang Diaz MD   Given at 10/25/23 1045    tranexamic acid (CYKLOKAPRON) 1,000 mg in sodium chloride 0.9 % 100 mL IVPB (MB+)  1,000 mg Intravenous Once Kang Diaz MD        tranexamic acid (CYKLOKAPRON) 1,000 mg in sodium chloride 0.9 % 100 mL IVPB (MB+)  1,000 mg Intravenous Once Kang Diaz MD              Objective:   Vitals:   Vitals:    10/02/24 1028   BP: 121/68   BP Location: Left arm   Pulse: 72   Resp: 18   Temp: 98.5 °F (36.9 °C)   TempSrc: Oral   SpO2: 95%   Weight: 49.3 kg (108 lb 11 oz)   Height: 5' 3" (1.6 m)         BMI: Body mass index is 19.25 kg/m².    Physical Exam  ECOG Performance Status:    General Appearance:    Alert, cooperative, no distress    Head:    Normocephalic, without obvious abnormality, atraumatic   Throat:   Lips, mucosa, and tongue " normal; teeth and gums normal   Neck:   Supple, symmetrical, no adenopathy;     thyroid:  no enlargement/tenderness/nodules   Lungs:     Clear to auscultation bilaterally, respirations unlabored    Heart:    Regular rate and rhythm, S1 and S2 normal   Abdomen:     Soft, non-tender, bowel sounds active, no masses, no organomegaly   Extremities:   Extremities normal, atraumatic, no cyanosis or edema   Pulses:   2+ and symmetric all extremities   Skin:   Skin color, texture, turgor normal, no rashes or lesions   Lymph nodes:   Cervical, supraclavicular, and axillary nodes normal   Neurologic:   CNII-XII intact, normal strength, sensation     Laboratory Data:  No visits with results within 1 Week(s) from this visit.   Latest known visit with results is:   Lab Visit on 09/25/2024   Component Date Value Ref Range Status    WBC 09/25/2024 22.49 (H)  3.90 - 12.70 K/uL Final    RBC 09/25/2024 3.70 (L)  4.00 - 5.40 M/uL Final    Hemoglobin 09/25/2024 12.0  12.0 - 16.0 g/dL Final    Hematocrit 09/25/2024 39.3  37.0 - 48.5 % Final    MCV 09/25/2024 106 (H)  82 - 98 fL Final    MCH 09/25/2024 32.4 (H)  27.0 - 31.0 pg Final    MCHC 09/25/2024 30.5 (L)  32.0 - 36.0 g/dL Final    RDW 09/25/2024 21.5 (H)  11.5 - 14.5 % Final    Platelets 09/25/2024 370  150 - 450 K/uL Final    MPV 09/25/2024 9.9  9.2 - 12.9 fL Final    Immature Granulocytes 09/25/2024 CANCELED  0.0 - 0.5 % Final    Result canceled by the ancillary.    Immature Grans (Abs) 09/25/2024 CANCELED  0.00 - 0.04 K/uL Final    Comment: Mild elevation in immature granulocytes is non specific and   can be seen in a variety of conditions including stress response,   acute inflammation, trauma and pregnancy. Correlation with other   laboratory and clinical findings is essential.    Result canceled by the ancillary.      nRBC 09/25/2024 0  0 /100 WBC Final    Gran % 09/25/2024 74.0 (H)  38.0 - 73.0 % Final    Lymph % 09/25/2024 9.0 (L)  18.0 - 48.0 % Final    Mono % 09/25/2024  14.0  4.0 - 15.0 % Final    Eosinophil % 09/25/2024 3.0  0.0 - 8.0 % Final    Basophil % 09/25/2024 0.0  0.0 - 1.9 % Final    Platelet Estimate 09/25/2024 Appears normal   Final    Aniso 09/25/2024 Slight   Final    Poik 09/25/2024 Slight   Final    Stomatocytes 09/25/2024 Present   Final    Toxic Granulation 09/25/2024 Present   Final    Fragmented Cells 09/25/2024 Occasional   Final    Differential Method 09/25/2024 Automated   Final    Sodium 09/25/2024 139  136 - 145 mmol/L Final    Potassium 09/25/2024 4.2  3.5 - 5.1 mmol/L Final    Chloride 09/25/2024 105  95 - 110 mmol/L Final    CO2 09/25/2024 23  23 - 29 mmol/L Final    Glucose 09/25/2024 85  70 - 110 mg/dL Final    BUN 09/25/2024 25 (H)  8 - 23 mg/dL Final    Creatinine 09/25/2024 1.0  0.5 - 1.4 mg/dL Final    Calcium 09/25/2024 9.5  8.7 - 10.5 mg/dL Final    Total Protein 09/25/2024 8.4  6.0 - 8.4 g/dL Final    Albumin 09/25/2024 3.6  3.5 - 5.2 g/dL Final    Total Bilirubin 09/25/2024 0.7  0.1 - 1.0 mg/dL Final    Comment: For infants and newborns, interpretation of results should be based  on gestational age, weight and in agreement with clinical  observations.    Premature Infant recommended reference ranges:  Up to 24 hours.............<8.0 mg/dL  Up to 48 hours............<12.0 mg/dL  3-5 days..................<15.0 mg/dL  6-29 days.................<15.0 mg/dL      Alkaline Phosphatase 09/25/2024 84  55 - 135 U/L Final    AST 09/25/2024 19  10 - 40 U/L Final    ALT 09/25/2024 14  10 - 44 U/L Final    eGFR 09/25/2024 59 (A)  >60 mL/min/1.73 m^2 Final    Anion Gap 09/25/2024 11  8 - 16 mmol/L Final    Pathologist Review 09/25/2024 Review required   Final    Protein, Serum 09/25/2024 8.0  6.0 - 8.4 g/dL Final    Comment: Serum protein electrophoresis and immunofixation results should be   interpreted in clinical context in that some therapeutic agents can   result   in false positive results (example, daratumumab). Correlation with   the   patient s  therapeutic regimen is required.      Albumin 09/25/2024 3.85  3.35 - 5.55 g/dL Final    Alpha-1 09/25/2024 0.37  0.17 - 0.41 g/dL Final    Alpha-2 09/25/2024 0.80  0.43 - 0.99 g/dL Final    Beta 09/25/2024 0.83  0.50 - 1.10 g/dL Final    Gamma 09/25/2024 2.15 (H)  0.67 - 1.58 g/dL Final    Immunofix Interp. 09/25/2024 SEE COMMENT   Final    Comment: Serum protein electrophoresis and immunofixation results should be   interpreted in clinical context in that some therapeutic agents can   result   in false positive results (example, daratumumab). Correlation with   the   patient s therapeutic regimen is required.  See pathologist's interpretation.      [place] Free Light Chains 09/25/2024 9.50 (H)  0.33 - 1.94 mg/dL Final    Lambda Free Light Chains 09/25/2024 8.52 (H)  0.57 - 2.63 mg/dL Final    Kappa/Lambda FLC Ratio 09/25/2024 1.12  0.26 - 1.65 Final    Comment: Undetected antigen excess is a rare event but cannot   be excluded. If these free light chain results do not   agree with other clinical or laboratory findings or   if the sample is from a patient that has previously   demonstrated antigen excess, discuss with the testing   laboratory.   Results should always be interpreted in conjunction   with other laboratory tests and clinical evidence.      Methlymalonic Acid 09/25/2024 0.30  <0.40 umol/L Final    Comment: If applicable, any drug confirmation testing reported  here was developed and the performance characteristics  determined by [facility] [employer] [facility]. This   confirmation testing has not been cleared or approved  by the FDA. The laboratory is regulated under CLIA as  qualified to perform high-complexity testing. This test  is used for patient testing purposes. It should not be  regarded as investigational or for research.    Test performed at [facility],  [address], [place]  [address]     [phone]  Wendi Laguerre MD, PhD - Medical Director      Folate 09/25/2024 >40.0 (H)   4.0 - 24.0 ng/mL Final    Ferritin 2024 241  20.0 - 300.0 ng/mL Final    Iron 2024 66  30 - 160 ug/dL Final    Transferrin 2024 239  200 - 375 mg/dL Final    TIBC 2024 354  250 - 450 ug/dL Final    Saturated Iron 2024 19 (L)  20 - 50 % Final    IgG 2024 2146 (H)  650 - 1600 mg/dL Final    IgG Cord Blood Reference Range: 650-1600 mg/dL.    IgA 2024 277  40 - 350 mg/dL Final    IgA Cord Blood Reference Range: <5 mg/dL.    IgM 2024 279  50 - 300 mg/dL Final    IgM Cord Blood Reference Range: <25 mg/dL.    Sol. Transferrin Receptor 2024 5.4 (H)  1.8 - 4.6 mg/L Final    Comment: -------------------ADDITIONAL INFORMATION-------------------  It is reported that    Americans may   have slightly   higher values.    Test Performed by:  [facility] [place] [address]  : Raina Isaac Ph.D.; CLIA# [id number]      Pathologist Review Peripheral Smear 2024 REVIEWED   Final    Comment:   Electronically reviewed and signed by:  Marianela Centeno M.D.  Signed on 24 at 15:30  Examination of the peripheral blood smear reveal macrocytic   erythrocytes. No target cells or schistocytes are seen. Leukocytosis   with left shift are identified. No hypersegmented neutrophils are   seen. No definitive blasts are identified. Platelets are present in   normal numbers and demonstrate a normal morphology.      Pathologist Interpretation RADHA 2024 REVIEWED   Final    Comment:   Electronically reviewed and signed by:  Charla Odom D.O.  Signed on 24 at 23:35  No monoclonal peaks identified.      Pathologist Interpretation SPE 2024 REVIEWED   Final    Comment:   Electronically reviewed and signed by:  Charla Odom D.O.  Signed on 24 at 23:35  Normal total protein.  Increased gamma globulin, polyclonal.  No monoclonal peaks identified.         Imagin24: CTA: acute bleed at the  level of the cecum - incidental findings: severe plaque burden at bilateral common iliac artery and bilateral common femoral artery     5/17/24: CT chest w/o contrast: COPD, scattered pulmonary nodules up to 7 mm in BJORN (exam limited by motion)    10/31/2023: CT chest- small new cluster of RLL micronodules, likely infectious/inflammatory     11/16/2023: CTA chest - evaluation limited due to motion artifact, patchy nonspecific GGOs involving lingula, RML and right lung base, may reflect infectious/inflammatory change     Endoscopies:   5/15/24: EGD: 2 cm hiatal hernia, otherwise unremarkable     5/16/24: Colonoscopy: poor prep   - clotted blood in entire colon; unable to visualize colon    Assessment   Assessment and Plan:   Jessica Floyd is a 74 y.o. female with nonobstructive CAD, HTN/CKD3, COPD/PH, SAH (2/2 to ruptured aneurysm in 1999), Right carotid artery stent (iatrogenic rupture during cerebral angiography, on aspirin), HFpEF (lasix), Incarcerated inguinal hernia s/p necrotic small bowel resection (5/17/24). She is followed by Hematology for a 2 year history of macrocytic anemia, first identified in 10/2022 with lab evidence of iron deficiency and B12 deficiency. She presents today for follow up.     # Macrocytic Anemia  # Iron Deficiency    # Vitamin B12 Deficiency      - Causes of Mrs. Floyd's anemia is likely multifactorial with a component of iron deficiency. She received IV iron in August 2024 and presents for follow up. Labs are improved with normal hgb, iron, and ferritin.   - no indication for further iron at this time  - repeat labs with Dr Rolon in February 2025    - Elevated WBC appear reactive. SPEP/RADHA shows no abnormal protein. Kappa and lambda elevated but ration within normal limits, supporting inflammation. Peripheral smear shows left-shifted WBC without hypersegmented neutrophils. Historically elevated ESR/CRP    # Other Co-Morbidities  - B12 deficiency: IF abs negative, MMA: 0.46 from 1.72  after starting IM B12 in May 2024, continue IM B12, per PCP   - Leukocytosis: CTM after acute issues resolved, if persistent, may need additional workup  - Elevated FLCs: normal ratio, CTM  - Incarcerated inguinal hernia s/p necrotic small bowel resection (24): encouraged GI and surgery follow up (GI messaged)  - COPD/Pulmonary nodules: noted on CT chest on 24, follow up with pulmonology in 2024 - would consider f/u CT chest in 3 months per Fleischner criteria  - PAD: severe plaque burden at bilateral common iliac artery and bilateral common femoral artery noted on CTA in May 2024, encouraged PCP f/u  - Epistaxis (2023, presumably from dry nasal mucosa, requiring rhino rocket): per ENT  - Nonobstructive CAD, HTN (HCTZ)/CKD3, COPD/PH on TTE, SAH ( to ruptured aneurysm in ), Right Carotid artery stent for complication (iatrogenic rupture during cerebral angiography), HFpEF (lasix): stable on current regimen, per PCP  - Cancer Screening: Colonoscopy:  (reportedly wnl), Pap:  (Pap: wnl, HPV: neg), Lung cancer screening? (CT chest: 10/2023 - new micronodules in RLL likely inflammatory but should f/u to ensure resolution - see above), MM2022: wnl    To do:  - RTC with Dr Rolon in 2025 with labs prior   - PCP (IM B12, MMG, pulmonary nodules), GI (scopes), Pulmonology (micronodules)  [] leukocytosis, FLC      Med Onc Chart Routing      Follow up with physician . 2025 with Dr Rolon   Follow up with [facility]    Infusion scheduling note    Injection scheduling note    Labs    Imaging    Pharmacy appointment    Other referrals          # Treatment Plan  - Medication: home meds  - Monitoring: Baseline CBC/Iron Profile and CBC/Iron Profile in 2025    MDM includes:    - Acute or chronic illness or injury that poses a threat to life or bodily function  - Review of prior external notes from unique source  - Independent review and explanation of 3+ results from unique tests   -  Discussion of management and ordering 3+ unique tests   - Extensive discussion of treatment and management    - Overall, I discussed the diagnosis, history, stage, labs/imaging, prognosis, management, and treatment plan as applicable. I reviewed adverse short and long term effects as applicable.   - Informed patient if symptoms are getting worse that it is their responsibility to call the clinic and schedule follow up sooner than stated follow up. Also informed patient if they do not hear from the appointment center in 2-5 business days for their referrals, the patient must call the Oncology clinic so we can follow up on procedures or referral scheduling. Patient was fully informed of current medical plan, all questions were answered and patient verbalized understanding. No further questions.       Signed   Fara Osorio MD  [facility]

## 2024-10-05 DIAGNOSIS — Z98.890 OTHER SPECIFIED POSTPROCEDURAL STATES: ICD-10-CM

## 2024-10-05 NOTE — TELEPHONE ENCOUNTER
No care due was identified.  [facility] Embedded Care Due Messages. Reference number: [id number].   10/05/2024 7:31:31 AM CDT

## 2024-10-07 RX ORDER — CELECOXIB 200 MG/1
CAPSULE ORAL
Qty: 30 CAPSULE | Refills: 2 | Status: SHIPPED | OUTPATIENT
Start: 2024-10-07

## 2024-10-07 NOTE — TELEPHONE ENCOUNTER
Refill Encounter    PCP Visits: Recent Visits  Date Type Provider Dept   11/27/23 Office Visit Effie Olmedo MD [facility]   Showing recent visits within past 360 days and meeting all other requirements  Future Appointments  No visits were found meeting these conditions.  Showing future appointments within next 720 days and meeting all other requirements     Last 3 Blood Pressure:   BP Readings from Last 3 Encounters:   10/02/24 121/68   09/16/24 113/71   09/10/24 (P) 122/68     Preferred Pharmacy:   [id number] - [place], [place] - [address] [address]  Phone: [phone] Fax: [phone]    Requested RX:  Requested Prescriptions     Pending Prescriptions Disp Refills    celecoxib (CELEBREX) 200 MG capsule [Pharmacy Med Name: CELECOXIB 200 MG CAPSULE] 30 capsule 2     Sig: TAKE 1 CAPSULE BY MOUTH DAILY AS NEEDED FOR PAIN.      RX Route: Normal

## 2024-10-08 ENCOUNTER — OFFICE VISIT (OUTPATIENT)
Dept: HOME HEALTH SERVICES | Facility: CLINIC | Age: 74
End: 2024-10-08
Payer: MEDICARE

## 2024-10-08 VITALS
HEART RATE: 68 BPM | DIASTOLIC BLOOD PRESSURE: 70 MMHG | SYSTOLIC BLOOD PRESSURE: 148 MMHG | HEIGHT: 63 IN | RESPIRATION RATE: 16 BRPM | TEMPERATURE: 97 F | WEIGHT: 108.69 LBS | OXYGEN SATURATION: 93 % | BODY MASS INDEX: 19.26 KG/M2

## 2024-10-08 DIAGNOSIS — E05.90 SUBCLINICAL HYPERTHYROIDISM: ICD-10-CM

## 2024-10-08 DIAGNOSIS — Z00.00 ENCOUNTER FOR PREVENTIVE HEALTH EXAMINATION: Primary | ICD-10-CM

## 2024-10-08 DIAGNOSIS — F41.9 ANXIETY: ICD-10-CM

## 2024-10-08 DIAGNOSIS — K21.9 GASTROESOPHAGEAL REFLUX DISEASE WITHOUT ESOPHAGITIS: Chronic | ICD-10-CM

## 2024-10-08 DIAGNOSIS — I65.23 BILATERAL CAROTID ARTERY STENOSIS: ICD-10-CM

## 2024-10-08 DIAGNOSIS — M81.6 LOCALIZED OSTEOPOROSIS OF LEQUESNE: ICD-10-CM

## 2024-10-08 DIAGNOSIS — E44.0 MODERATE MALNUTRITION: ICD-10-CM

## 2024-10-08 DIAGNOSIS — I10 PRIMARY HYPERTENSION: Chronic | ICD-10-CM

## 2024-10-08 DIAGNOSIS — Z99.89 DEPENDENCE ON OTHER ENABLING MACHINES AND DEVICES: ICD-10-CM

## 2024-10-08 DIAGNOSIS — J43.9 PULMONARY EMPHYSEMA, UNSPECIFIED EMPHYSEMA TYPE: Chronic | ICD-10-CM

## 2024-10-08 DIAGNOSIS — N18.31 CKD STAGE G3A/A1, GFR 45-59 AND ALBUMIN CREATININE RATIO <30 MG/G: ICD-10-CM

## 2024-10-08 DIAGNOSIS — D50.0 IRON DEFICIENCY ANEMIA SECONDARY TO BLOOD LOSS (CHRONIC): ICD-10-CM

## 2024-10-08 DIAGNOSIS — I25.10 CORONARY ARTERY DISEASE INVOLVING NATIVE CORONARY ARTERY OF NATIVE HEART WITHOUT ANGINA PECTORIS: Chronic | ICD-10-CM

## 2024-10-08 DIAGNOSIS — I50.30 HEART FAILURE WITH PRESERVED EJECTION FRACTION, UNSPECIFIED HF CHRONICITY: ICD-10-CM

## 2024-10-08 DIAGNOSIS — I70.0 AORTIC ATHEROSCLEROSIS: ICD-10-CM

## 2024-10-08 DIAGNOSIS — I96 GANGRENE, NOT ELSEWHERE CLASSIFIED: ICD-10-CM

## 2024-10-08 DIAGNOSIS — M10.9 GOUT, UNSPECIFIED CAUSE, UNSPECIFIED CHRONICITY, UNSPECIFIED SITE: Chronic | ICD-10-CM

## 2024-10-08 PROBLEM — J10.1 INFLUENZA A: Status: RESOLVED | Noted: 2023-12-28 | Resolved: 2024-10-08

## 2024-10-08 PROBLEM — U07.1 COVID-19: Status: RESOLVED | Noted: 2023-11-15 | Resolved: 2024-10-08

## 2024-10-08 PROCEDURE — 1170F FXNL STATUS ASSESSED: CPT | Mod: CPTII,NTX,S$GLB,

## 2024-10-08 PROCEDURE — G0439 PPPS, SUBSEQ VISIT: HCPCS | Mod: NTX,S$GLB,,

## 2024-10-08 PROCEDURE — 1160F RVW MEDS BY RX/DR IN RCRD: CPT | Mod: CPTII,NTX,S$GLB,

## 2024-10-08 PROCEDURE — 1159F MED LIST DOCD IN RCRD: CPT | Mod: CPTII,NTX,S$GLB,

## 2024-10-08 PROCEDURE — 3078F DIAST BP <80 MM HG: CPT | Mod: CPTII,NTX,S$GLB,

## 2024-10-08 PROCEDURE — 1158F ADVNC CARE PLAN TLK DOCD: CPT | Mod: CPTII,NTX,S$GLB,

## 2024-10-08 PROCEDURE — 1126F AMNT PAIN NOTED NONE PRSNT: CPT | Mod: CPTII,NTX,S$GLB,

## 2024-10-08 PROCEDURE — 1101F PT FALLS ASSESS-DOCD LE1/YR: CPT | Mod: CPTII,NTX,S$GLB,

## 2024-10-08 PROCEDURE — 3077F SYST BP >= 140 MM HG: CPT | Mod: CPTII,NTX,S$GLB,

## 2024-10-08 PROCEDURE — 3288F FALL RISK ASSESSMENT DOCD: CPT | Mod: CPTII,NTX,S$GLB,

## 2024-10-08 NOTE — PROGRESS NOTES
"Jessica Floyd presented for an initial Medicare AWV today. The following components were reviewed and updated:    Medical history  Family History  Social history  Allergies and Current Medications  Health Risk Assessment  Health Maintenance  Care Team    **See Completed Assessments for Annual Wellness visit with in the encounter summary    The following assessments were completed:  Depression Screening  Cognitive function Screening: Declines  Timed Get Up Test: Deferred, impaired mobility  Whisper Test      Opioid documentation:      Patient does not have a current opioid prescription.          Vitals:    10/08/24 0844   BP: (!) 148/70   BP Location: Left arm   Patient Position: Sitting   Pulse: 68   Resp: 16   Temp: 97 °F (36.1 °C)   SpO2: (!) 93%   Weight: 49.3 kg (108 lb 11 oz)   Height: 5' 3" (1.6 m)     Body mass index is 19.25 kg/m².       Physical Exam  Vitals reviewed.   Constitutional:       General: She is not in acute distress.     Appearance: Normal appearance. She is underweight.   HENT:      Head: Normocephalic.   Cardiovascular:      Rate and Rhythm: Normal rate and regular rhythm.      Pulses: Normal pulses.      Heart sounds: Normal heart sounds.   Pulmonary:      Effort: Pulmonary effort is normal. No respiratory distress.      Breath sounds: Normal breath sounds. No wheezing.   Abdominal:      General: Bowel sounds are normal.      Tenderness: There is no abdominal tenderness.   Musculoskeletal:         General: Normal range of motion.      Cervical back: Normal range of motion.      Right lower leg: No edema.      Left lower leg: No edema.   Skin:     General: Skin is warm and dry.      Capillary Refill: Capillary refill takes less than 2 seconds.   Neurological:      General: No focal deficit present.      Mental Status: She is alert and oriented to person, place, and time.      Gait: Gait abnormal.   Psychiatric:         Mood and Affect: Mood normal.         Behavior: Behavior normal. "           Diagnoses and health risks identified today and associated recommendations/orders:    1. Encounter for preventive health examination  Assessments completed.  HM recommendations reviewed.  F/u with PCP as instructed.      2. CKD stage G3a/A1, GFR 45-59 and albumin creatinine ratio <30 mg/g  Chronic, stable on current regimen. Followed by PCP.     3. Pulmonary emphysema, unspecified emphysema type  Chronic, stable on current Breo Ellipta & Spiriva regimen. Followed by PCP.     4. Aortic atherosclerosis  Chronic, stable on current statin regimen. Followed by PCP.     5. Gangrene, not elsewhere classified  Chronic, stable on current regimen. Followed by PCP.     6. Heart failure with preserved ejection fraction, unspecified HF chronicity  Chronic, stable on current Lasix, HCTZ regimen. Followed by Cardiology.     7. Moderate malnutrition  Chronic, stable on current regimen. Followed by PCP.     8. Primary hypertension  Chronic, stable on current Amlodipine, HCTZ regimen. Followed by PCP.     9. Bilateral carotid artery stenosis  Chronic, stable on current statin regimen. Followed by PCP.    10. Coronary artery disease involving native coronary artery of native heart without angina pectoris  Chronic, stable on current regimen. Followed by Cardiology.     11. Iron deficiency anemia secondary to blood loss (chronic)  Chronic, stable on current regimen. Followed by PCP.   Lab Results   Component Value Date    WBC 22.49 (H) 09/25/2024    HGB 12.0 09/25/2024    HCT 39.3 09/25/2024     (H) 09/25/2024     09/25/2024     12. Subclinical hyperthyroidism  Chronic, stable on current regimen. Followed by PCP.   Lab Results   Component Value Date    TSH 0.719 10/28/2023     13. Localized osteoporosis of [place]  Chronic, stable on current regimen. Followed by PCP.     14. Gastroesophageal reflux disease without esophagitis  Chronic, stable on current Protonix regimen. Followed by PCP.     15. Gout,  unspecified cause, unspecified chronicity, unspecified site  Chronic, stable on current Allopurinol & Colcrys regimen. Followed by Rheumatology.     16. Anxiety  Chronic, stable on current regimen. Followed by PCP.     17. Dependence on other enabling machines and devices  Uses standard walker.       Provided Jessica with a 5-10 year written screening schedule and personal prevention plan. Recommendations were developed using the USPSTF age appropriate recommendations. Education, counseling, and referrals were provided as needed.  After Visit Summary printed and given to patient which includes a list of additional screenings\tests needed.    Follow up in about 1 year (around 10/8/2025) for Medicare AWV.      Felicity Swain NP    I offered to discuss advanced care planning, including how to pick a person who would make decisions for you if you were unable to make them for yourself, called a health care power of , and what kind of decisions you might make such as use of life sustaining treatments such as ventilators and tube feeding when faced with a life limiting illness recorded on a living will that they will need to know. (How you want to be cared for as you near the end of your natural life)     X Patient is interested in learning more about how to make advanced directives.  I provided them paperwork and offered to discuss this with them.

## 2024-10-08 NOTE — PATIENT INSTRUCTIONS
Counseling and Referral of Other Preventative  (Italic type indicates deductible and co-insurance are waived)    Patient Name: Jessica Floyd  Today's Date: 10/8/2024    Health Maintenance       Date Due Completion Date    DEXA Scan 07/26/2026 7/26/2023    Override on 2/9/2016: Done    Lipid Panel 04/05/2027 4/5/2022    Override on 6/7/2016: Done    TETANUS VACCINE 08/04/2027 8/4/2017    Colorectal Cancer Screening 05/16/2034 5/16/2024    Override on 2/9/2016: Done        No orders of the defined types were placed in this encounter.    The following information is provided to all patients.  This information is to help you find resources for any of the problems found today that may be affecting your health:                  Living healthy guide: www.[facility].[place].gov      Understanding Diabetes: www.diabetes.org      Eating healthy: www.cdc.gov/healthyweight      CDC home safety checklist: www.cdc.gov/steadi/patient.html      Agency on Aging: www.goea.[place].gov      Alcoholics anonymous (AA): www.aa.org      Physical Activity: www.[place]abrera.nih.gov/cn6kgdh      Tobacco use: www.quitwithusla.org

## 2024-10-12 ENCOUNTER — PATIENT MESSAGE (OUTPATIENT)
Dept: TRANSPLANT | Facility: CLINIC | Age: 74
End: 2024-10-12
Payer: MEDICARE

## 2024-10-14 DIAGNOSIS — J43.9 PULMONARY EMPHYSEMA, UNSPECIFIED EMPHYSEMA TYPE: Primary | Chronic | ICD-10-CM

## 2024-10-17 ENCOUNTER — PATIENT MESSAGE (OUTPATIENT)
Dept: TRANSPLANT | Facility: CLINIC | Age: 74
End: 2024-10-17
Payer: MEDICARE

## 2024-10-23 ENCOUNTER — OFFICE VISIT (OUTPATIENT)
Dept: INTERNAL MEDICINE | Facility: CLINIC | Age: 74
End: 2024-10-23
Payer: MEDICARE

## 2024-10-23 ENCOUNTER — HOSPITAL ENCOUNTER (OUTPATIENT)
Dept: RADIOLOGY | Facility: OTHER | Age: 74
Discharge: HOME | End: 2024-10-23
Attending: INTERNAL MEDICINE
Payer: MEDICARE

## 2024-10-23 VITALS
HEART RATE: 81 BPM | BODY MASS INDEX: 18.75 KG/M2 | OXYGEN SATURATION: 99 % | SYSTOLIC BLOOD PRESSURE: 132 MMHG | DIASTOLIC BLOOD PRESSURE: 72 MMHG | WEIGHT: 105.81 LBS | HEIGHT: 63 IN

## 2024-10-23 DIAGNOSIS — R91.8 MULTIPLE LUNG NODULES ON CT: ICD-10-CM

## 2024-10-23 DIAGNOSIS — I25.10 CORONARY ARTERY DISEASE INVOLVING NATIVE CORONARY ARTERY OF NATIVE HEART WITHOUT ANGINA PECTORIS: ICD-10-CM

## 2024-10-23 DIAGNOSIS — E79.0 HYPERURICEMIA: ICD-10-CM

## 2024-10-23 DIAGNOSIS — J43.2 CENTRILOBULAR EMPHYSEMA: ICD-10-CM

## 2024-10-23 DIAGNOSIS — Z09 FOLLOW-UP EXAM: Primary | ICD-10-CM

## 2024-10-23 DIAGNOSIS — E53.8 B12 DEFICIENCY: ICD-10-CM

## 2024-10-23 DIAGNOSIS — N18.31 STAGE 3A CHRONIC KIDNEY DISEASE: ICD-10-CM

## 2024-10-23 DIAGNOSIS — I10 ESSENTIAL HYPERTENSION: ICD-10-CM

## 2024-10-23 PROCEDURE — 1101F PT FALLS ASSESS-DOCD LE1/YR: CPT | Mod: CPTII,S$GLB,, | Performed by: INTERNAL MEDICINE

## 2024-10-23 PROCEDURE — 1126F AMNT PAIN NOTED NONE PRSNT: CPT | Mod: CPTII,S$GLB,, | Performed by: INTERNAL MEDICINE

## 2024-10-23 PROCEDURE — 3075F SYST BP GE 130 - 139MM HG: CPT | Mod: CPTII,S$GLB,, | Performed by: INTERNAL MEDICINE

## 2024-10-23 PROCEDURE — 99214 OFFICE O/P EST MOD 30 MIN: CPT | Mod: S$GLB,,, | Performed by: INTERNAL MEDICINE

## 2024-10-23 PROCEDURE — 3078F DIAST BP <80 MM HG: CPT | Mod: CPTII,S$GLB,, | Performed by: INTERNAL MEDICINE

## 2024-10-23 PROCEDURE — 3288F FALL RISK ASSESSMENT DOCD: CPT | Mod: CPTII,S$GLB,, | Performed by: INTERNAL MEDICINE

## 2024-10-23 PROCEDURE — 71250 CT THORAX DX C-: CPT | Mod: TC,TXP

## 2024-10-23 PROCEDURE — 99999 PR PBB SHADOW E&M-EST. PATIENT-LVL IV: CPT | Mod: PBBFAC,,, | Performed by: INTERNAL MEDICINE

## 2024-10-23 RX ORDER — ALLOPURINOL 100 MG/1
200 TABLET ORAL DAILY
Qty: 60 TABLET | Refills: 11 | Status: SHIPPED | OUTPATIENT
Start: 2024-10-23 | End: 2025-10-18

## 2024-10-23 NOTE — PROGRESS NOTES
Subjective:      History of Present Illness    CHIEF COMPLAINT:  Jessica presents for a follow-up visit to discuss recent health status and upcoming appointments.    HPI:  Jessica reports overall improvement in her health status. She had a minor gout flare 1 month ago, which was not painful but reminded her to manage her condition. She has sinus problems with rhinorrhea, which she finds significantly bothersome. She uses Flonase every morning, providing partial but insufficient relief.    Her breathing has been excellent recently, with no dyspnea at rest. She occasionally has shortness of breath with exertion and movement, often preceded or accompanied by anxiety. She takes Seroquel for these episodes, which she finds highly effective in managing her anxiety symptoms.    Her appetite has improved, and she consumes 3 large meals and 4 substantial snacks daily, including a 400-calorie muffin as a mid-morning snack. Her weight has been relatively stable, with a slight increase noted.    She continues to manage her gout with colchicine and allopurinol, taking 2 tablets of allopurinol daily. She takes hydrochlorothiazide 25 mg for edema, which has been highly effective in controlling swelling. She administers weekly B12 injections as prescribed.    She is scheduled for a chest CT today to follow up on previously identified small nodules, possibly related to aspiration or reflux during a past small bowel obstruction. She has upcoming appointments with Dr. Shapley from the lung transplant team for advanced lung disease and pulmonary hypertension on , and with rheumatologist Dr. Vela 5 days later.    She denies chest pain, significant shortness of breath at rest, and pedal edema.    MEDICAL HISTORY:  Jessica has a history of pulmonary hypertension, gout, and arthritis.    FAMILY HISTORY:  Family history is significant for brother who is .      ROS:  ENT: +runny nose  Cardiovascular: -chest  "pain  Respiratory: +shortness of breath  Musculoskeletal: -joint pain          Review of Systems     Past medical history, surgical history, and family medical history reviewed and updated as appropriate.    Medications and allergies reviewed.     Objective:          Vitals:    10/23/24 0959   BP: 132/72   BP Location: Left arm   Patient Position: Sitting   Pulse: 81   SpO2: 99%   Weight: 48 kg (105 lb 13.1 oz)   Height: 5' 3" (1.6 m)     Body mass index is 18.75 kg/m².  Physical Exam  Constitutional:       Appearance: She is well-developed.   HENT:      Head: Normocephalic and atraumatic.   Eyes:      Extraocular Movements: Extraocular movements intact.   Cardiovascular:      Rate and Rhythm: Normal rate and regular rhythm.      Heart sounds: Normal heart sounds.   Pulmonary:      Effort: Pulmonary effort is normal. No respiratory distress.      Breath sounds: Normal breath sounds. No wheezing.   Abdominal:      General: Bowel sounds are normal. There is no distension.      Palpations: Abdomen is soft.      Tenderness: There is no abdominal tenderness.   Musculoskeletal:         General: No tenderness. Normal range of motion.      Cervical back: Normal range of motion.   Skin:     General: Skin is warm and dry.   Neurological:      Mental Status: She is alert and oriented to person, place, and time.      Cranial Nerves: No cranial nerve deficit.      Deep Tendon Reflexes: Reflexes are normal and symmetric.         Lab Results   Component Value Date    WBC 22.49 (H) 09/25/2024    HGB 12.0 09/25/2024    HCT 39.3 09/25/2024     09/25/2024    CHOL 176 04/05/2022    TRIG 88 04/05/2022    HDL 69 04/05/2022    ALT 14 09/25/2024    AST 19 09/25/2024     09/25/2024    K 4.2 09/25/2024     09/25/2024    CREATININE 1.0 09/25/2024    BUN 25 (H) 09/25/2024    CO2 23 09/25/2024    TSH 0.719 10/28/2023    INR 1.0 07/04/2024    HGBA1C 5.2 11/18/2023       Assessment:       1. Follow-up exam    2. Essential " hypertension    3. Hyperuricemia    4. Coronary artery disease involving native coronary artery of native heart without angina pectoris    5. Centrilobular emphysema    6. B12 deficiency    7. Stage 3a chronic kidney disease          Plan:     Jessica was seen today for follow-up.    Diagnoses and all orders for this visit:    Follow-up exam    Essential hypertension    Hyperuricemia  -     allopurinoL (ZYLOPRIM) 100 MG tablet; Take 2 tablets (200 mg total) by mouth once daily.    Coronary artery disease involving native coronary artery of native heart without angina pectoris    Centrilobular emphysema  Comments:  follows pulmonology    B12 deficiency    Stage 3a chronic kidney disease        Health maintenance reviewed with patient.     Follow up in about 6 months (around 4/23/2025).    Jace Valencia MD  Internal Medicine / Primary Care  [facility]  10/23/2024    This note was generated with the assistance of ambient listening technology. Verbal consent was obtained by the patient and accompanying visitor(s) for the recording of patient appointment to facilitate this note. I attest to having reviewed and edited the generated note for accuracy, though some syntax or spelling errors may persist. Please contact the author of this note for any clarification.

## 2024-10-25 ENCOUNTER — TELEPHONE (OUTPATIENT)
Dept: CARDIOLOGY | Facility: CLINIC | Age: 74
End: 2024-10-25
Payer: MEDICARE

## 2024-10-25 RX ORDER — CARVEDILOL 6.25 MG/1
6.25 TABLET ORAL 2 TIMES DAILY
Qty: 180 TABLET | Refills: 3 | Status: SHIPPED | OUTPATIENT
Start: 2024-10-25 | End: 2025-10-25

## 2024-10-25 RX ORDER — HYDROCHLOROTHIAZIDE 12.5 MG/1
12.5 CAPSULE ORAL
Qty: 90 CAPSULE | Refills: 0 | OUTPATIENT
Start: 2024-10-25

## 2024-10-25 NOTE — TELEPHONE ENCOUNTER
Provider Staff:  Action required for this patient    Requires labs      Please see care gap opportunities below in Care Due Message.    Thanks!  [facility]     Appointments      Date Provider   Last Visit   10/23/2024 Jace Valencia MD   Next Visit   Visit date not found Jace Valencia MD     Refill Decision Note   Jessica Floyd  is requesting a refill authorization.  Brief Assessment and Rationale for Refill:  Quick Discontinue     Medication Therapy Plan: The original prescription was discontinued on 9/10/2024 by Michael Lal MD.      Comments:     Note composed:9:43 AM 10/25/2024

## 2024-10-25 NOTE — TELEPHONE ENCOUNTER
Care Due:                  Date            Visit Type   Department     Provider  --------------------------------------------------------------------------------                                EP -                              PRIMARY      [facility] INTERNAL  Last Visit: 10-      CARE ([facility])   GAVIN Valencia                              EP -                              PRIMARY      [facility] INTERNAL  Next Visit: 04-      CARE ([facility])   GAVIN Valencia                                                            Last  Test          Frequency    Reason                     Performed    Due Date  --------------------------------------------------------------------------------    Uric Acid...  12 months..  allopurinoL..............  05- 05-    [facility] Embedded Care Due Messages. Reference number: [id number].   10/25/2024 9:33:09 AM CDT

## 2024-10-25 NOTE — TELEPHONE ENCOUNTER
----- Message from Yara sent at 10/25/2024 10:57 AM CDT -----  Regarding: Pharmacy Authorization for Carvedilol 6.25 mg  10/25/2024    Hello,      I received a call from MARY SLATER [[id number]] regarding her refill at [facility] on [facility] [address] [facility] for Carvedilol 6.25 mg. The pharmacy phone number is [phone]. Please give her a call. She can be reached at [phone].     Thank you,   Yara CARPENTER   Access Navigator

## 2024-11-12 ENCOUNTER — TELEPHONE (OUTPATIENT)
Dept: TRANSPLANT | Facility: CLINIC | Age: 74
End: 2024-11-12
Payer: MEDICARE

## 2024-11-13 ENCOUNTER — OFFICE VISIT (OUTPATIENT)
Dept: TRANSPLANT | Facility: CLINIC | Age: 74
End: 2024-11-13
Payer: MEDICARE

## 2024-11-13 VITALS
HEIGHT: 63 IN | RESPIRATION RATE: 16 BRPM | TEMPERATURE: 98 F | HEART RATE: 70 BPM | DIASTOLIC BLOOD PRESSURE: 67 MMHG | SYSTOLIC BLOOD PRESSURE: 125 MMHG | WEIGHT: 108.5 LBS | BODY MASS INDEX: 19.22 KG/M2

## 2024-11-13 DIAGNOSIS — J43.9 PULMONARY EMPHYSEMA, UNSPECIFIED EMPHYSEMA TYPE: Chronic | ICD-10-CM

## 2024-11-13 DIAGNOSIS — J30.9 ALLERGIC RHINITIS, UNSPECIFIED SEASONALITY, UNSPECIFIED TRIGGER: Primary | ICD-10-CM

## 2024-11-13 PROCEDURE — 1160F RVW MEDS BY RX/DR IN RCRD: CPT | Mod: CPTII,NTX,S$GLB, | Performed by: INTERNAL MEDICINE

## 2024-11-13 PROCEDURE — 3288F FALL RISK ASSESSMENT DOCD: CPT | Mod: CPTII,NTX,S$GLB, | Performed by: INTERNAL MEDICINE

## 2024-11-13 PROCEDURE — 3074F SYST BP LT 130 MM HG: CPT | Mod: CPTII,NTX,S$GLB, | Performed by: INTERNAL MEDICINE

## 2024-11-13 PROCEDURE — 1159F MED LIST DOCD IN RCRD: CPT | Mod: CPTII,NTX,S$GLB, | Performed by: INTERNAL MEDICINE

## 2024-11-13 PROCEDURE — 3078F DIAST BP <80 MM HG: CPT | Mod: CPTII,NTX,S$GLB, | Performed by: INTERNAL MEDICINE

## 2024-11-13 PROCEDURE — 1125F AMNT PAIN NOTED PAIN PRSNT: CPT | Mod: CPTII,NTX,S$GLB, | Performed by: INTERNAL MEDICINE

## 2024-11-13 PROCEDURE — 99214 OFFICE O/P EST MOD 30 MIN: CPT | Mod: NTX,S$GLB,, | Performed by: INTERNAL MEDICINE

## 2024-11-13 PROCEDURE — 99999 PR PBB SHADOW E&M-EST. PATIENT-LVL V: CPT | Mod: PBBFAC,TXP,, | Performed by: INTERNAL MEDICINE

## 2024-11-13 PROCEDURE — 1101F PT FALLS ASSESS-DOCD LE1/YR: CPT | Mod: CPTII,NTX,S$GLB, | Performed by: INTERNAL MEDICINE

## 2024-11-13 NOTE — PROGRESS NOTES
ADVANCED LUNG DISEASE FOLLOW-UP                                                                                                                                             Reason for Visit:  Dyspnea; COPD    Referring Physician: Hina Roberts, *    History of Present Illness: Jessica Floyd is a 74 y.o. female who is on 0 of oxygen.  She is on no assisted ventilation.  Her New York Heart Association Class is II and a Karnofsky score of 90% - Able to carry on normal activity: minor symptoms of disease. She is not diabetic.    Here today for routine follow-up.  Since her last visit, she has been doing well.  She had a previous prolonged hospitalization for GIB and small bowel incarceration which required ICU stay and emergent surgery.  She has continued to improve.  Feels like her strength and stamina are improving.  Continues to have issues with weight gain although she is trying to gain weight and appetite is good.  She wants to be more active and is questioning if it is ok to walk and exert herself.  She no longer wears supplemental oxygen.  Overall, doing well.         Previous Visti  Pt presents for follow-up of her COPD.  She has COPD with PH on TTE.  She was hospitalized in December for acute on chronic hypoxic RF 2/2 influenza.  She recovered and now feels back to her baseline.  She takes her inhalers as directed.  Has an albuterol prn inhaler but does not need to use it.  Remains active.  Does not have hypercapnea or require NIPPV.  She would like testing to see how much O2 she needs as she is planning a trip and does not have a portable concentrator to travel with.  She also endorses panic attacks and anxiety/PTSD over medical traumas in the past.  Has been trying to establish with psych without any luck.  Overall, feels at her baseline.         Past Medical History:   Diagnosis Date    Allergic rhinitis     Amblyopia     Carotid stenosis     Coronary atherosclerosis     Outside [facility] 6/2014: 20% mLAD,  50% mCx, 20%, mRCA    COVID-19 11/15/2023    Dyslipidemia     ESBL (extended spectrum beta-lactamase) producing bacteria infection     UTI    Hypertension     Influenza A 12/28/2023    Nausea and vomiting 05/26/2017    Pulmonary emphysema 10/28/2023    SAH (subarachnoid hemorrhage) 08/24/2016    1999, s/p clipping    Subarachnoid hemorrhage due to ruptured aneurysm 1999       Past Surgical History:   Procedure Laterality Date    ADENOIDECTOMY      ANTERIOR CRUCIATE LIGAMENT REPAIR  2012    APPENDECTOMY      CATARACT EXTRACTION W/  INTRAOCULAR LENS IMPLANT Right 11/07/2022    Procedure: EXTRACTION, CATARACT, WITH IOL INSERTION;  Surgeon: Higinio Cristina MD;  Location: [facility];  Service: Ophthalmology;  Laterality: Right;    CATARACT EXTRACTION W/  INTRAOCULAR LENS IMPLANT Left 11/21/2022    Procedure: EXTRACTION, CATARACT, WITH IOL INSERTION;  Surgeon: Higinio Cristina MD;  Location: [facility];  Service: Ophthalmology;  Laterality: Left;    CEREBRAL ANEURYSM REPAIR  1999    clip    COLONOSCOPY N/A 5/16/2024    Procedure: COLONOSCOPY;  Surgeon: Rich Syed MD;  Location: [facility] (2ND FLR);  Service: Endoscopy;  Laterality: N/A;    DENTAL SURGERY      DIAGNOSTIC LAPAROSCOPY N/A 5/17/2024    Procedure: LAPAROSCOPY, DIAGNOSTIC;  Surgeon: Ruben Oscar MD;  [facility];  Service: General;  Laterality: N/A;    ESOPHAGOGASTRODUODENOSCOPY N/A 5/15/2024    Procedure: EGD (ESOPHAGOGASTRODUODENOSCOPY);  Surgeon: Rich Syed MD;  Location: [facility] (2ND FLR);  Service: Endoscopy;  Laterality: N/A;    EXCISION, SMALL INTESTINE N/A 5/17/2024    Procedure: EXCISION, SMALL INTESTINE;  Surgeon: Ruben Oscar MD;  [facility];  Service: General;  Laterality: N/A;    EYE SURGERY Bilateral     cataract removal and lens implants    HIP ARTHROPLASTY Left 05/28/2023    Procedure: TOTAL HIP ARTHROPLASTY;  Surgeon: Juan Wan MD;  [facility];  Service:  Orthopedics;  Laterality: Left;    LAPAROTOMY, EXPLORATORY N/A 5/17/2024    Procedure: LAPAROTOMY, EXPLORATORY;  Surgeon: Ruben Oscar MD;  Location: [facility] OR 2ND FLR;  Service: General;  Laterality: N/A;    REPAIR, HERNIA, INGUINAL Bilateral 5/17/2024    Procedure: REPAIR, HERNIA, INGUINAL;  Surgeon: Ruben Oscar MD;  Location: [facility] OR 2ND FLR;  Service: General;  Laterality: Bilateral;    TONSILLECTOMY         Allergies: Hydromorphone    Current Outpatient Medications   Medication Sig    allopurinoL (ZYLOPRIM) 100 MG tablet Take 2 tablets (200 mg total) by mouth once daily.    amLODIPine (NORVASC) 5 MG tablet Take 1 tablet (5 mg total) by mouth once daily.    atorvastatin (LIPITOR) 80 MG tablet Take 1 tablet (80 mg total) by mouth once daily.    carvediloL (COREG) 6.25 MG tablet Take 1 tablet (6.25 mg total) by mouth 2 (two) times daily.    celecoxib (CELEBREX) 200 MG capsule TAKE 1 CAPSULE BY MOUTH DAILY AS NEEDED FOR PAIN.    colchicine (COLCRYS) 0.6 mg tablet Take 1 tablet (0.6 mg total) by mouth once daily.    cyanocobalamin 1,000 mcg/mL injection Inject 1,000 mcg into the muscle every 7 days.    fluticasone propionate (FLONASE) 50 mcg/actuation nasal spray 2 sprays (100 mcg total) by Each Nostril route once daily.    fluticasone-umeclidin-vilanter (TRELEGY ELLIPTA) 200-62.5-25 mcg inhaler Inhale 1 puff into the lungs once daily.    furosemide (LASIX) 20 MG tablet Take 1 tablet (20 mg total) by mouth once daily. (Patient taking differently: Take 20 mg by mouth as needed.)    hydroCHLOROthiazide (HYDRODIURIL) 25 MG tablet Take 1 tablet (25 mg total) by mouth once daily.    LIDOcaine 4 % Gel Apply 1 g topically 2 (two) times daily as needed (foot pain).    LIDOcaine-prilocaine (EMLA) cream Apply topically as needed.    montelukast (SINGULAIR) 10 mg tablet Take 1 tablet (10 mg total) by mouth every evening.    pantoprazole (PROTONIX) 40 MG tablet Take 1 tablet (40 mg total) by mouth 2 (two) times  daily.    QUEtiapine (SEROQUEL) 50 MG tablet Take 1 tablet (50 mg total) by mouth 2 (two) times daily.    syringe, disposable, 1 mL Syrg 1 Stick by Misc.(Non-Drug; Combo Route) route once a week.     No current facility-administered medications for this visit.     Facility-Administered Medications Ordered in Other Visits   Medication    mupirocin 2 % ointment    tranexamic acid (CYKLOKAPRON) 1,000 mg in sodium chloride 0.9 % 100 mL IVPB (MB+)    tranexamic acid (CYKLOKAPRON) 1,000 mg in sodium chloride 0.9 % 100 mL IVPB (MB+)       Immunization History   Administered Date(s) Administered    COVID-19, MRNA, LN-S, PF (Pfizer) (Gray Cap) 04/20/2022    COVID-19, MRNA, LN-S, PF (Pfizer) (Purple Cap) 01/09/2021, 01/30/2021, 08/23/2021    COVID-19, mRNA, LNP-S, PF, astrid-sucrose, 30 mcg/0.3 mL (Pfizer Ages 12+) 09/30/2023, 09/16/2024    COVID-19, mRNA, LNP-S, bivalent booster, PF (Moderna Omicron)12 + YEARS 09/30/2022    Influenza (FLUAD) - Quadrivalent - Adjuvanted - PF *Preferred* (65+) 09/30/2023    Influenza - Quadrivalent - High Dose - PF (65 years and older) 10/02/2020, 10/08/2021, 09/24/2022    Influenza - Trivalent - Fluzone High Dose - PF (65 years and older) 10/03/2016, 11/21/2017, 10/16/2018, 11/12/2019    Pneumococcal Conjugate - 13 Valent 10/03/2016    Pneumococcal Polysaccharide - 23 Valent 11/21/2017    RSVpreF (Arexvy) 12/01/2023    Tdap 08/04/2017    Zoster Recombinant 10/08/2021, 05/23/2022     Family History:    Family History   Problem Relation Name Age of Onset    Hypertension Mother      Aneurysm Mother      Hypertension Father      Alcohol abuse Father      Kidney disease Brother      Heart disease Brother      Gout Brother      No Known Problems Daughter Hannah     No Known Problems Daughter Aby     No Known Problems Daughter Diya      Social History     Substance and Sexual Activity   Alcohol Use Yes    Alcohol/week: 7.0 standard drinks of alcohol    Types: 7 Glasses of wine per week    Comment:  One glass of Red wine prior to dinner      Social History     Substance and Sexual Activity   Drug Use No      Social History     Socioeconomic History    Marital status:    Occupational History    Occupation: Retired   Tobacco Use    Smoking status: Former     Current packs/day: 0.00     Average packs/day: 0.5 packs/day for 20.0 years (10.0 ttl pk-yrs)     Types: Cigarettes     Start date: 1979     Quit date: 1999     Years since quittin.8     Passive exposure: Past    Smokeless tobacco: Never   Substance and Sexual Activity    Alcohol use: Yes     Alcohol/week: 7.0 standard drinks of alcohol     Types: 7 Glasses of wine per week     Comment: One glass of Red wine prior to dinner    Drug use: No    Sexual activity: Yes     Partners: Male   Social History Narrative    .  Has 3 grown daughters.       Social Drivers of Health     Financial Resource Strain: Low Risk  (10/8/2024)    Overall Financial Resource Strain (CARDIA)     Difficulty of Paying Living Expenses: Not hard at all   Food Insecurity: No Food Insecurity (10/8/2024)    Hunger Vital Sign     Worried About Running Out of Food in the Last Year: Never true     Ran Out of Food in the Last Year: Never true   Transportation Needs: No Transportation Needs (10/8/2024)    PRAPARE - Transportation     Lack of Transportation (Medical): No     Lack of Transportation (Non-Medical): No   Physical Activity: Insufficiently Active (10/8/2024)    Exercise Vital Sign     Days of Exercise per Week: 7 days     Minutes of Exercise per Session: 10 min   Stress: No Stress Concern Present (10/8/2024)    [employer] - Occupational Stress Questionnaire     Feeling of Stress : Only a little   Housing Stability: Low Risk  (10/8/2024)    Housing Stability Vital Sign     Unable to Pay for Housing in the Last Year: No     Homeless in the Last Year: No     Review of Systems   Constitutional:  Negative for chills, diaphoresis, fever,  "malaise/fatigue and weight loss.   HENT:  Negative for congestion, ear discharge, ear pain, hearing loss, nosebleeds, sinus pain, sore throat and tinnitus.    Eyes:  Negative for blurred vision, double vision, photophobia, pain, discharge and redness.   Respiratory:  Positive for shortness of breath. Negative for cough, hemoptysis, sputum production, wheezing and stridor.    Cardiovascular:  Negative for chest pain, palpitations, orthopnea, claudication, leg swelling and PND.   Gastrointestinal:  Negative for abdominal pain, blood in stool, constipation, diarrhea, heartburn, melena, nausea and vomiting.   Genitourinary:  Negative for dysuria, flank pain, frequency, hematuria and urgency.   Musculoskeletal:  Negative for back pain, falls, joint pain, myalgias and neck pain.   Skin:  Negative for itching and rash.   Neurological:  Negative for dizziness, tingling, tremors, sensory change, speech change, focal weakness, seizures, loss of consciousness, weakness and headaches.   Endo/Heme/Allergies:  Negative for environmental allergies and polydipsia. Does not bruise/bleed easily.   Psychiatric/Behavioral:  Negative for depression, hallucinations, memory loss, substance abuse and suicidal ideas. The patient is not nervous/anxious and does not have insomnia.      Vitals  /67 (BP Location: Right arm, Patient Position: Sitting)   Pulse 70   Temp 97.9 °F (36.6 °C) (Oral)   Resp 16   Ht 5' 3" (1.6 m)   Wt 49.2 kg (108 lb 8 oz)   BMI 19.22 kg/m²   Physical Exam  Vitals and nursing note reviewed.   Constitutional:       General: She is not in acute distress.     Appearance: She is well-developed. She is not diaphoretic.   HENT:      Head: Normocephalic and atraumatic.      Nose: Nose normal.      Mouth/Throat:      Pharynx: No oropharyngeal exudate.   Eyes:      General: No scleral icterus.     Conjunctiva/sclera: Conjunctivae normal.      Pupils: Pupils are equal, round, and reactive to light.   Neck:      " Thyroid: No thyromegaly.      Vascular: No JVD.      Trachea: No tracheal deviation.   Cardiovascular:      Rate and Rhythm: Normal rate and regular rhythm.      Heart sounds: Normal heart sounds. No murmur heard.     No friction rub. No gallop.   Pulmonary:      Effort: Pulmonary effort is normal. No respiratory distress.      Breath sounds: Decreased air movement (bilateral) present. No wheezing or rales.   Abdominal:      General: Bowel sounds are normal. There is no distension.      Palpations: Abdomen is soft.      Tenderness: There is no abdominal tenderness.   Musculoskeletal:         General: No deformity. Normal range of motion.      Cervical back: Normal range of motion and neck supple.   Skin:     General: Skin is warm and dry.      Capillary Refill: Capillary refill takes less than 2 seconds.      Coloration: Skin is not pale.      Findings: No erythema or rash.   Neurological:      Mental Status: She is alert and oriented to person, place, and time.      Cranial Nerves: No cranial nerve deficit.   Psychiatric:         Judgment: Judgment normal.         Labs:  No visits with results within 7 Day(s) from this visit.   Latest known visit with results is:   Lab Visit on 09/25/2024   Component Date Value    WBC 09/25/2024 22.49 (H)     RBC 09/25/2024 3.70 (L)     Hemoglobin 09/25/2024 12.0     Hematocrit 09/25/2024 39.3     MCV 09/25/2024 106 (H)     MCH 09/25/2024 32.4 (H)     MCHC 09/25/2024 30.5 (L)     RDW 09/25/2024 21.5 (H)     Platelets 09/25/2024 370     MPV 09/25/2024 9.9     Immature Granulocytes 09/25/2024 CANCELED     Immature Grans (Abs) 09/25/2024 CANCELED     nRBC 09/25/2024 0     Gran % 09/25/2024 74.0 (H)     Lymph % 09/25/2024 9.0 (L)     Mono % 09/25/2024 14.0     Eosinophil % 09/25/2024 3.0     Basophil % 09/25/2024 0.0     Platelet Estimate 09/25/2024 Appears normal     Aniso 09/25/2024 Slight     Poik 09/25/2024 Slight     Stomatocytes 09/25/2024 Present     Toxic Granulation 09/25/2024  Present     Fragmented Cells 09/25/2024 Occasional     Differential Method 09/25/2024 Automated     Sodium 09/25/2024 139     Potassium 09/25/2024 4.2     Chloride 09/25/2024 105     CO2 09/25/2024 23     Glucose 09/25/2024 85     BUN 09/25/2024 25 (H)     Creatinine 09/25/2024 1.0     Calcium 09/25/2024 9.5     Total Protein 09/25/2024 8.4     Albumin 09/25/2024 3.6     Total Bilirubin 09/25/2024 0.7     Alkaline Phosphatase 09/25/2024 84     AST 09/25/2024 19     ALT 09/25/2024 14     eGFR 09/25/2024 59 (A)     Anion Gap 09/25/2024 11     Pathologist Review 09/25/2024 Review required     Protein, Serum 09/25/2024 8.0     Albumin 09/25/2024 3.85     Alpha-1 09/25/2024 0.37     Alpha-2 09/25/2024 0.80     Beta 09/25/2024 0.83     Gamma 09/25/2024 2.15 (H)     Immunofix Interp. 09/25/2024 SEE COMMENT     [place] Free Light Chains 09/25/2024 9.50 (H)     Lambda Free Light Chains 09/25/2024 8.52 (H)     Kappa/Lambda FLC Ratio 09/25/2024 1.12     Methlymalonic Acid 09/25/2024 0.30     Folate 09/25/2024 >40.0 (H)     Ferritin 09/25/2024 241     Iron 09/25/2024 66     Transferrin 09/25/2024 239     TIBC 09/25/2024 354     Saturated Iron 09/25/2024 19 (L)     IgG 09/25/2024 2146 (H)     IgA 09/25/2024 277     IgM 09/25/2024 279     Sol. Transferrin Receptor 09/25/2024 5.4 (H)     Pathologist Review Perip* 09/25/2024 REVIEWED     Pathologist Interpretati* 09/25/2024 REVIEWED     Pathologist Interpretati* 09/25/2024 REVIEWED            12/1/2023     8:58 AM   Pulmonary Function Tests   FVC 1.95 liters   FEV1 1.12 liters   TLC (liters) 5.13 liters   DLCO (ml/mmHg sec) 5.92 ml/mmHg sec   FVC% 74   FEV1% 55   FEF 25-75 0.54   FEF 25-75% 31   TLC% 107   RV 3.18   RV% 152   DLCO% 29         8/27/2024    11:38 AM 1/31/2024    11:39 AM 12/1/2023     9:05 AM   6MW   6MWT Status completed without stopping completed with stops completed with stops   Patient Reported No complaints Dyspnea;Leg pain Dyspnea   Was O2 used? No No No   6MW  Distance walked (feet) 677 feet 650 feet 650 feet   Distance walked (meters) 206.35 meters 198.12 meters 198.12 meters   Did patient stop? No Yes Yes   Oxygen Saturation 91 % 92 % 96 %   Supplemental Oxygen Room Air Room Air Room Air   Heart Rate 84 bpm 92 bpm 87 bpm   Blood Pressure 81/54 132/81 138/72   Jessica Dyspnea Rating  nothing at all nothing at all nothing at all   Oxygen Saturation 95 % 89 % 94 %   Supplemental Oxygen Room Air Room Air Room Air   Heart Rate 94 bpm 101 bpm 92 bpm   Blood Pressure 113/68 149/71 136/81   Jessica Dyspnea Rating  nothing at all moderate moderate   Recovery Time (seconds) 182 seconds 72 seconds 101 seconds   Oxygen Saturation 93 % 91 % 97 %   Supplemental Oxygen Room Air Room Air Room Air   Heart Rate 86 bpm 93 bpm 79 bpm       Imaging:  Results for orders placed in visit on 09/23/18    XR CHEST PA AND LATERAL    Narrative  EXAMINATION:  XR CHEST PA AND LATERAL    CLINICAL HISTORY:  Cough    TECHNIQUE:  PA and lateral views of the chest were performed.    COMPARISON:  CT 05/25/2017, radiograph 05/25/2017    FINDINGS:  The cardiomediastinal silhouette is not enlarged, noting calcification of the aortic arch..  There is no pleural effusion.  The trachea is midline.  The lungs are symmetrically expanded bilaterally with mildly coarse interstitial attenuation, and mild bilateral basilar subsegmental atelectasis..  No large focal consolidation seen.  There is no pneumothorax.  The osseous structures are remarkable for degenerative changes..    IMPRESSION:    1. No acute cardiopulmonary process.      Electronically signed by: Armani Yuan MD  Date:    09/23/2018  Time:    12:50    Results for orders placed during the hospital encounter of 07/26/23        US Carotid Bilateral    Narrative  EXAMINATION:  US CAROTID BILATERAL    CLINICAL HISTORY:  check patency of known carotid stent given syncope;    TECHNIQUE:  Grayscale and color spectral Doppler ultrasound imaging of the carotid and  "vertebral artery systems bilaterally.    COMPARISON:  None    FINDINGS:  Patient with reported right common carotid artery stent.    Right:    Internal Carotid Artery (ICA) peak systolic velocity 22 cm/sec    Internal Carotid Artery (ICA) end-diastolic velocity 7 cm/sec    Common Carotid Artery (CCA) peak systolic velocity 30 cm/sec    Common Carotid Artery (CCA) end-diastolic velocity 8 cm/sec    ICA/CCA peak systolic velocity ratio: 0.7    ICA/CCA end-diastolic velocity ratio: 0.8    Plaque formation: Homogeneous    Vertebral artery: Antegrade flow and normal waveform.    Left:    Internal Carotid Artery (ICA)  peak systolic velocity 43 cm/sec    Internal Carotid Artery (ICA) end-diastolic velocity 15 cm/sec    Common Carotid Artery (CCA) peak systolic velocity 47 cm/sec    Common Carotid Artery (CCA) end-diastolic velocity 11 cm/sec    ICA/CCA peak systolic velocity ratio: 0.9    ICA/CCA end diastolic velocity ratio: 1.4    Plaque formation: Homogeneous    Vertebral artery: Antegrade flow and normal waveform.    Impression  1. Right common carotid artery stent patent.  2. 1 - 39% stenosis of the right internal carotid artery.  3. 1 - 39% stenosis of the left internal carotid artery.    Electronically signed by resident: Keo Jang  Date:    10/29/2023  Time:    08:45    Electronically signed by: Danis Guerrero Jr  Date:    10/29/2023  Time:    08:50    Results for orders placed during the hospital encounter of 10/28/23    CT Chest Without Contrast    Narrative  EXAMINATION:  CT CHEST WITHOUT CONTRAST    CLINICAL HISTORY:  "Dyspnea, chronic, unclear etiology;"    COMPARISON:  10/28/2023.    TECHNIQUE:  Volumetric data acquisition of the chest from the lung apices to the adrenals was obtained without intravenous contrast. Sagittal and coronal multiplanar reconstructions were performed. Lack of IV contrast material limits the assessment of mediastinal and abdominal structures.    FINDINGS:  The airways are " patent.    The thyroid gland is homogeneous, normal in size.    No mediastinal, hilar or subcarinal adenopathy.    The thoracic aorta is of normal caliber and demonstrates moderate atherosclerotic plaque.    The cardiac silhouette appears within normal limits in size, no pericardial effusion.  Coronary artery calcifications seen.  Stent in the right common carotid artery.    The esophagus appears normal in course and caliber.    Three lobular and paraseptal emphysema.    New cluster of micro nodules within the right lower lobe, 302:362.  Small Bochdalek hernia.    The left lung is well aerated.    No pleural effusion.    The axillary regions appear normal.    The upper abdominal organs demonstrate a left hepatic lobe low attenuating lesion 2.3 cm.    The osseous structures straight mild loss of height at T11-T12 L1, unchanged.  No new fracture.  Moderate degenerative change of the right shoulder joint.    Impression  Small, new cluster of micro nodules, right lower lobe, most often seen in the setting of infection, follow-up recommended after treatment completed to ensure resolution.    Emphysema.    Left hepatic lobe hypoattenuating lesion suggestive of a cyst consider non emergent ultrasound evaluation.    See other details above.    This report was flagged in Epic as abnormal.      Electronically signed by: Joan Fernández MD  Date:    10/31/2023  Time:    15:05        Cardiodiagnostics:  Results for orders placed during the hospital encounter of 10/28/23    Echo    Interpretation Summary    Left Ventricle: The left ventricle is normal in size. Normal wall thickness. Normal wall motion. There is normal systolic function with a visually estimated ejection fraction of 60 - 65%. There is normal diastolic function.    Right Ventricle: Mild right ventricular enlargement. Wall thickness is normal. Right ventricle wall motion  is normal. Systolic function is normal.    Aortic Valve: The aortic valve is a unicuspid  valve. There is mild aortic valve sclerosis. There is trace aortic regurgitation.    Mitral Valve: There is mild regurgitation.    Pulmonary Artery: There is mild pulmonary hypertension. The estimated pulmonary artery systolic pressure is 40 mmHg.    IVC/SVC: Intermediate venous pressure at 8 mmHg.        Assessment:  1. Allergic rhinitis, unspecified seasonality, unspecified trigger    2. Pulmonary emphysema, unspecified emphysema type        Plan:   Followed for COPD.  On Breo and Spiriva.  Tolerating well.  Recent exacerbation in Dec 2023 2/2 influenza; no further exacerbations since.  Has recovered and is at baseline following multiple hospitalizations for GI issues.  TTE with ePAP of 40mmHg likely related to hyperinflation from emphysema.  No longer requires supplemental oxygen.    Continue with allergy regimen.         Follow-up in 3 months with PFTs and 6MWT.          Hina Roberts D.O.  Pulmonary/Critical Care and Lung Transplantation  [facility]

## 2024-11-13 NOTE — LETTER
November 18, 2024                      [facility] 1st [facility]  [address]  [facility] [address]  Phone: [phone]   Patient: Jessica Floyd   MR Number: [id number]   YOB: 1950   Date of Visit: 11/13/2024       Dear       Thank you for referring Jessica Floyd to me for evaluation. Attached you will find relevant portions of my assessment and plan of care.    If you have questions, please do not hesitate to call me. I look forward to following Jessica Floyd along with you.    Sincerely,    Hina Roberts, DO    Enclosure    If you would like to receive this communication electronically, please contact [email or web address] or [phone] to request [employer] access.    [employer] is a tool which provides read-only access to select patient information with whom you have a relationship. Its easy to use and provides real time access to review your patients record including encounter summaries, notes, results, and demographic information.    If you feel you have received this communication in error or would no longer like to receive these types of communications, please e-mail [email or web address]

## 2024-11-17 ENCOUNTER — PATIENT MESSAGE (OUTPATIENT)
Dept: TRANSPLANT | Facility: CLINIC | Age: 74
End: 2024-11-17
Payer: MEDICARE

## 2024-11-18 ENCOUNTER — OFFICE VISIT (OUTPATIENT)
Dept: RHEUMATOLOGY | Facility: CLINIC | Age: 74
End: 2024-11-18
Payer: MEDICARE

## 2024-11-18 ENCOUNTER — LAB VISIT (OUTPATIENT)
Dept: LAB | Facility: HOSPITAL | Age: 74
End: 2024-11-18
Attending: INTERNAL MEDICINE
Payer: MEDICARE

## 2024-11-18 ENCOUNTER — PATIENT MESSAGE (OUTPATIENT)
Dept: TRANSPLANT | Facility: CLINIC | Age: 74
End: 2024-11-18
Payer: MEDICARE

## 2024-11-18 VITALS
SYSTOLIC BLOOD PRESSURE: 115 MMHG | BODY MASS INDEX: 19.11 KG/M2 | WEIGHT: 107.88 LBS | DIASTOLIC BLOOD PRESSURE: 74 MMHG | HEART RATE: 80 BPM

## 2024-11-18 DIAGNOSIS — I25.10 CORONARY ARTERY DISEASE INVOLVING NATIVE CORONARY ARTERY OF NATIVE HEART WITHOUT ANGINA PECTORIS: Chronic | ICD-10-CM

## 2024-11-18 DIAGNOSIS — N18.31 CKD STAGE G3A/A1, GFR 45-59 AND ALBUMIN CREATININE RATIO <30 MG/G: ICD-10-CM

## 2024-11-18 DIAGNOSIS — J43.9 PULMONARY EMPHYSEMA, UNSPECIFIED EMPHYSEMA TYPE: Chronic | ICD-10-CM

## 2024-11-18 DIAGNOSIS — M1A.0720 IDIOPATHIC CHRONIC GOUT OF LEFT FOOT WITHOUT TOPHUS: Primary | ICD-10-CM

## 2024-11-18 DIAGNOSIS — M1A.0720 IDIOPATHIC CHRONIC GOUT OF LEFT FOOT WITHOUT TOPHUS: ICD-10-CM

## 2024-11-18 DIAGNOSIS — K21.9 GASTROESOPHAGEAL REFLUX DISEASE WITHOUT ESOPHAGITIS: Chronic | ICD-10-CM

## 2024-11-18 DIAGNOSIS — M15.0 PRIMARY OSTEOARTHRITIS INVOLVING MULTIPLE JOINTS: ICD-10-CM

## 2024-11-18 LAB — URATE SERPL-MCNC: 6.9 MG/DL (ref 2.4–5.7)

## 2024-11-18 PROCEDURE — 1101F PT FALLS ASSESS-DOCD LE1/YR: CPT | Mod: CPTII,NTX,S$GLB, | Performed by: INTERNAL MEDICINE

## 2024-11-18 PROCEDURE — 1125F AMNT PAIN NOTED PAIN PRSNT: CPT | Mod: CPTII,NTX,S$GLB, | Performed by: INTERNAL MEDICINE

## 2024-11-18 PROCEDURE — 3078F DIAST BP <80 MM HG: CPT | Mod: CPTII,NTX,S$GLB, | Performed by: INTERNAL MEDICINE

## 2024-11-18 PROCEDURE — 3074F SYST BP LT 130 MM HG: CPT | Mod: CPTII,NTX,S$GLB, | Performed by: INTERNAL MEDICINE

## 2024-11-18 PROCEDURE — 84550 ASSAY OF BLOOD/URIC ACID: CPT | Mod: TXP | Performed by: INTERNAL MEDICINE

## 2024-11-18 PROCEDURE — 1160F RVW MEDS BY RX/DR IN RCRD: CPT | Mod: CPTII,NTX,S$GLB, | Performed by: INTERNAL MEDICINE

## 2024-11-18 PROCEDURE — 99214 OFFICE O/P EST MOD 30 MIN: CPT | Mod: NTX,S$GLB,, | Performed by: INTERNAL MEDICINE

## 2024-11-18 PROCEDURE — 1159F MED LIST DOCD IN RCRD: CPT | Mod: CPTII,NTX,S$GLB, | Performed by: INTERNAL MEDICINE

## 2024-11-18 PROCEDURE — 36415 COLL VENOUS BLD VENIPUNCTURE: CPT | Mod: TXP | Performed by: INTERNAL MEDICINE

## 2024-11-18 PROCEDURE — 3288F FALL RISK ASSESSMENT DOCD: CPT | Mod: CPTII,NTX,S$GLB, | Performed by: INTERNAL MEDICINE

## 2024-11-18 PROCEDURE — 99999 PR PBB SHADOW E&M-EST. PATIENT-LVL IV: CPT | Mod: PBBFAC,TXP,, | Performed by: INTERNAL MEDICINE

## 2024-11-18 NOTE — PROGRESS NOTES
History of present illness:  74-year-old female with multiple medical problems including COPD, atherosclerotic disease, chronic GI disease, and osteoarthritis.  Two years ago she developed an episode of podagra.  She was clinically diagnosed as having gout.  She was seen by Dr. Naranjo and started on allopurinol and colchicine.  She was last seen 1 year ago.  She is on allopurinol 200 mg daily.    She had a gout attack over the summer.  She apparently had been hospitalized with GI problems and was not given her allopurinol.  She was seen by Podiatry but by then they attack had abated.  She was just placed on lidocaine cream for her bunions.  She has had no attacks since.  She is tolerating the medications.  She has had problems with her shoulders and gets repeated injections.  She is also having some pain in the right wrist.    Physical examination:  Musculoskeletal:  She has decreased range of motion of the shoulders bilaterally.  She has pain on range of motion of the right shoulder.    Elbows are unremarkable.  She has no tophi.    She has slight tenderness of the right wrist.  She has no swelling.  She has good range of motion.  Left wrist is unremarkable.    She has marked bony hypertrophy of the PIP in D IP bilaterally.    Knees and ankles are unremarkable.    She has bilateral cocked up toe deformities.  She has a bunion on her right great toe.  She has no tophi.  She has a small erosion of the right 2nd toe.      Assessment:  1. Inter critical gout  2.  Osteoarthritis   Plans:   1. Check uric acid level   2. Continue allopurinol 200 mg daily  3.  Return in 12 months      Answers submitted by the patient for this visit:  Rheumatology Questionnaire (Submitted on 11/11/2024)  fever: No  eye redness: Yes  mouth sores: No  headaches: Yes  shortness of breath: No  chest pain: No  trouble swallowing: No  diarrhea: No  constipation: No  unexpected weight change: No  genital sore: No  dysuria: No  During the last 3  days, have you had a skin rash?: No  Bruises or bleeds easily: Yes  cough: No

## 2024-11-19 ENCOUNTER — PATIENT MESSAGE (OUTPATIENT)
Dept: RHEUMATOLOGY | Facility: CLINIC | Age: 74
End: 2024-11-19
Payer: MEDICARE

## 2024-11-19 ENCOUNTER — TELEPHONE (OUTPATIENT)
Dept: TRANSPLANT | Facility: CLINIC | Age: 74
End: 2024-11-19
Payer: MEDICARE

## 2024-11-19 ENCOUNTER — TELEPHONE (OUTPATIENT)
Dept: RHEUMATOLOGY | Facility: CLINIC | Age: 74
End: 2024-11-19
Payer: MEDICARE

## 2024-11-19 DIAGNOSIS — M1A.0720 IDIOPATHIC CHRONIC GOUT OF LEFT FOOT WITHOUT TOPHUS: Primary | ICD-10-CM

## 2024-11-19 DIAGNOSIS — J43.9 PULMONARY EMPHYSEMA, UNSPECIFIED EMPHYSEMA TYPE: Primary | Chronic | ICD-10-CM

## 2024-11-19 NOTE — TELEPHONE ENCOUNTER
Received a verbal order from Dr. Roberts for discontinuation of the patient's home Oxygen equipment. Order sent to [facility] (Fax # [phone]). Received email confirmation that the email was sent successfully. Also sent an email to Lauryn Luciano with [facility] re: the new provider order.     11/22/24: Called patient to follow up re: her Oxygen equipment. She reported that [facility] is scheduled to  the equipment from her home on Monday, 11/25/24.

## 2024-11-22 ENCOUNTER — PATIENT MESSAGE (OUTPATIENT)
Dept: INTERNAL MEDICINE | Facility: CLINIC | Age: 74
End: 2024-11-22
Payer: MEDICARE

## 2024-11-22 DIAGNOSIS — H10.9 CONJUNCTIVITIS, UNSPECIFIED CONJUNCTIVITIS TYPE, UNSPECIFIED LATERALITY: Primary | ICD-10-CM

## 2024-11-25 ENCOUNTER — PATIENT MESSAGE (OUTPATIENT)
Dept: TRANSPLANT | Facility: CLINIC | Age: 74
End: 2024-11-25
Payer: MEDICARE

## 2024-11-25 ENCOUNTER — OFFICE VISIT (OUTPATIENT)
Dept: PSYCHIATRY | Facility: CLINIC | Age: 74
End: 2024-11-25
Payer: MEDICARE

## 2024-11-25 VITALS
HEART RATE: 80 BPM | SYSTOLIC BLOOD PRESSURE: 133 MMHG | DIASTOLIC BLOOD PRESSURE: 63 MMHG | WEIGHT: 108.31 LBS | BODY MASS INDEX: 19.18 KG/M2

## 2024-11-25 DIAGNOSIS — F17.211 NICOTINE DEPENDENCE, CIGARETTES, IN REMISSION: ICD-10-CM

## 2024-11-25 DIAGNOSIS — N18.31 CKD STAGE G3A/A1, GFR 45-59 AND ALBUMIN CREATININE RATIO <30 MG/G: ICD-10-CM

## 2024-11-25 DIAGNOSIS — J43.9 PULMONARY EMPHYSEMA, UNSPECIFIED EMPHYSEMA TYPE: Primary | ICD-10-CM

## 2024-11-25 DIAGNOSIS — E05.90 SUBCLINICAL HYPERTHYROIDISM: ICD-10-CM

## 2024-11-25 DIAGNOSIS — I65.23 BILATERAL CAROTID ARTERY STENOSIS: ICD-10-CM

## 2024-11-25 DIAGNOSIS — I25.10 CORONARY ARTERY DISEASE INVOLVING NATIVE CORONARY ARTERY OF NATIVE HEART WITHOUT ANGINA PECTORIS: ICD-10-CM

## 2024-11-25 DIAGNOSIS — R55 SITUATIONAL SYNCOPE: ICD-10-CM

## 2024-11-25 DIAGNOSIS — F41.9 ANXIETY: ICD-10-CM

## 2024-11-25 DIAGNOSIS — Z87.81 HISTORY OF FRACTURE OF LEFT HIP: ICD-10-CM

## 2024-11-25 PROCEDURE — 99999 PR PBB SHADOW E&M-EST. PATIENT-LVL II: CPT | Mod: PBBFAC,TXP,, | Performed by: PSYCHIATRY & NEUROLOGY

## 2024-11-25 RX ORDER — QUETIAPINE FUMARATE 50 MG/1
50 TABLET, FILM COATED ORAL 2 TIMES DAILY
Qty: 180 TABLET | Refills: 1 | Status: SHIPPED | OUTPATIENT
Start: 2024-11-25 | End: 2025-05-24

## 2024-11-25 RX ORDER — ERYTHROMYCIN 5 MG/G
OINTMENT OPHTHALMIC
Qty: 3.5 G | Refills: 0 | Status: SHIPPED | OUTPATIENT
Start: 2024-11-25

## 2024-11-25 NOTE — PATIENT INSTRUCTIONS
PLAN:   Follow UP Mar 26 2025 @ 2pm  IN CLINIC    Psyc meds: Seroquel 50 mg  TWICE a day #180 x1    We discussed her doing some counseling such as [facility] OR [facility] (IOP) // as well as seeking [facility] counselor so as to attempt to have improved coping skills to manage her anxiety. MD encouraged her to call for more information 9as below       1)  [facility]enced-Based Psych[facility] program, [facility]: ([phone])   and ask to set up an intake eval    Website: ([email or web address]   (Copy and paste to your browser)     Current Conditions Treated:  PTSD  Insomnia  Panic Attacks  Depression  Anxiety  Chronic Pain  Stress    The [facility] is ideal for patients who:  Desire short-term rather than long-term psychotherapy  Want structured psychotherapy sessions for specific targets  Are willing to engage in daily practice assignments  Can commit to weekly psychotherapy for a time-limited period (usually between 6-12 weeks)    Note: Possibility of virtual  (telehealth) participation may exist    call for more information ([phone])     2) Individual Counseling:        A) Call [facility]              [phone] (there may be significant wait times)     B) Contact   your  insurance carrier:  via their Website or call and ask for assistance    C) Psychology Today: [email or web address]     References:     Relaxation stress reduction workbook: ABBY Freeman PhD ( used: $7-10)    Feeling Good Website: Danis Hernandez MD / [email or web address] website (free) / chintan. PODCASTS    Anxiety &  phobia workbook by AHMET Oneal PhD  (web retailers: used: $ 7-10)    [place]: Path to Better Sleep : https://www.veterantraining.va.gov/insomnia/ (free)     Pt expressed appreciation for the visit today and did not have further question at this time though pt  was still informed to:      Call  if problems.    Call / Report Side Effects to Psyc MD     Encouraged to follow up with primary care / Gen Med MD for continued monitoring of general health and wellness.    Understanding was expressed; and no further concerns nor questions were raised at this time.     Remember healthy self care:   eat right  attempt adequate rest   HANDWASHING / encourage such chintan. During this corona virus time   walk or light exercise within reason and as your general med team approves  read or explore any of reference materials / homework mentioned  reach out (I.e.,  connect with)  others who nuture and bring out best in you  avoid risky behaviors    Keep your appointments:    IF you  cannot make your appt THEN please call [phone]  or go online (via My Chart pedro) to reschedule.    It is the responsibility of the patient to reschedule an appointment if an appointment has been canceled or missed.    Avoid  alcohol and illicit substances.  Look for the positive.  All is often relative-seek balance  Call sooner if needed : [phone]  Call 911 or go to Emergency Room  (ER)  if  any acute concerns

## 2024-11-25 NOTE — PROGRESS NOTES
"    Jessica Floyd   1950 11/25/2024     Disclaimer: Evaluation and treatment is based on information presented to date. Any new information may affect assessment and findings.     The patient location is: In Clinic      in wheelchair / tho does walk some at home    S: Patient's Own Perception of Condition (& Side Effects) : no side effects      O:      CURRENT PRESENTATION:     Reviewed elements pt initial history    Goal directed    Calm    No SI  no HI    No Psychosis    Mood: ok     Affect:   shows range    Psyc  Medication:     Seroquel 50 mg at bed #90 x1    [facility]  /noted off label use THO she is adamant that such is WORKING / largely eliminated spells she was having; denies lightereded / falls; risk benefit discussed and told read package insert and IF any questions to ask Psyc MD /   Have discussed med alternatives such as Remeron Paxil Buspar; yet she desires remain on what's working: Seroquel. Toild her IF any issues to let me know  Understanding Expressed      Constitutional Health Concerns: had GI adhesions tho cleared    Vitals:    11/25/24 1351   BP: 133/63   Pulse: 80        Wt Readings from Last 3 Encounters:   11/25/24 49.1 kg (108 lb 4.8 oz)   11/18/24 48.9 kg (107 lb 14.4 oz)   11/13/24 49.2 kg (108 lb 8 oz)      Laboratory Data  Lab Visit on 11/18/2024   Component Date Value Ref Range Status    Uric Acid 11/18/2024 6.9 (H)  2.4 - 5.7 mg/dL Final      Mental Status Exam:   Appearance: casual / in wheelchair / can walk around house ok / just used today / get around quickly  Oriented: x 3   Attitude: cooperative engaging   Eye Contact: good   Behavior: sits calm in wheel chair   Mood: says ok"  Cognition: alert  Concentration: grossly intact   Affect: appropriate range   Anxiety: moderate  Thought Process: goal directed   Speech: Volume : WNL   Quantity WNL   Quality: appears to openly answer questions   Eye Contact: good   Threats: no SI / no HI   Memory: intact " (as relay information across time spans)  Psychosis: denies all   Estimate of Intellectual Function: upper average   Impulse Control: no history SI / nor HI ; calm here   Musculoskeletal:      Pain Level: R shoulder 8 of 10  / soon to get injection / gets  injection about q 3 months     Social:  x 56yrs [place] ([place] / \She has previously spoke about being  56 yrs /   ([place]) is from country of [place]; pt recounts how they went to [place] to get  because he was 18 years old; she was 17; says back then  in [place] ; a man had to be 21 yr old    Patient Active Problem List   Diagnosis    Coronary artery disease involving native coronary artery of native heart without angina pectoris    Bilateral carotid artery stenosis    Gastroesophageal reflux disease without esophagitis    CKD stage G3a/A1, GFR 45-59 and albumin creatinine ratio <30 mg/g    Hypokalemia    Hypertension    Subclinical hyperthyroidism    Allergic rhinitis    Iron deficiency anemia secondary to blood loss (chronic)    History of fracture of left hip    Leukocytosis    Acute blood loss anemia    Malnutrition of mild degree    Pathological fracture due to osteoporosis    Osteoporosis    Localized osteoporosis of [place]    Impaired mobility    Postnasal drip    Reduced vision-Left eye    Gout    Right rotator cuff tear arthropathy    SOB (shortness of breath)    Eosinophilia    Anxiety    PAC (premature atrial contraction)    Aortic atherosclerosis    Pulmonary emphysema    Weight loss    Moderate malnutrition    (HFpEF) heart failure with preserved ejection fraction    History Situational (ie Stress Induced) syncope ([facility] 12-25-23    Nicotine dependence, cigarettes, in FULL REMISSION:in past smoked for 17yrs from 16y to about 34y / LUPE:F pack a day    Ischemia, bowel    Hypernatremia    Incarcerated inguinal hernia    Left leg pain    Abrasion    Comfort measures only status    Localized  swelling of left lower extremity    Thrombocytosis    Skin ulcer of sacrum, with unspecified severity          Current Outpatient Medications:     allopurinoL (ZYLOPRIM) 100 MG tablet, Take 2 tablets (200 mg total) by mouth once daily., Disp: 60 tablet, Rfl: 11    amLODIPine (NORVASC) 5 MG tablet, Take 1 tablet (5 mg total) by mouth once daily., Disp: , Rfl:     atorvastatin (LIPITOR) 80 MG tablet, Take 1 tablet (80 mg total) by mouth once daily., Disp: 90 tablet, Rfl: 3    carvediloL (COREG) 6.25 MG tablet, Take 1 tablet (6.25 mg total) by mouth 2 (two) times daily., Disp: 180 tablet, Rfl: 3    celecoxib (CELEBREX) 200 MG capsule, TAKE 1 CAPSULE BY MOUTH DAILY AS NEEDED FOR PAIN., Disp: 30 capsule, Rfl: 2    colchicine (COLCRYS) 0.6 mg tablet, Take 1 tablet (0.6 mg total) by mouth once daily., Disp: 30 tablet, Rfl: 8    cyanocobalamin 1,000 mcg/mL injection, Inject 1,000 mcg into the muscle every 7 days., Disp: , Rfl:     erythromycin (ROMYCIN) ophthalmic ointment, Instill ~1-cm ribbon into affected eye(s) 4 times daily for 7 days, Disp: 3.5 g, Rfl: 0    fluticasone propionate (FLONASE) 50 mcg/actuation nasal spray, 2 sprays (100 mcg total) by Each Nostril route once daily., Disp: , Rfl:     fluticasone-umeclidin-vilanter (TRELEGY ELLIPTA) 200-62.5-25 mcg inhaler, Inhale 1 puff into the lungs once daily., Disp: 60 each, Rfl: 11    hydroCHLOROthiazide (HYDRODIURIL) 25 MG tablet, Take 1 tablet (25 mg total) by mouth once daily., Disp: 90 tablet, Rfl: 3    LIDOcaine 4 % Gel, Apply 1 g topically 2 (two) times daily as needed (foot pain)., Disp: , Rfl:     LIDOcaine-prilocaine (EMLA) cream, Apply topically as needed., Disp: 30 g, Rfl: 3    montelukast (SINGULAIR) 10 mg tablet, Take 1 tablet (10 mg total) by mouth every evening., Disp: 30 tablet, Rfl: 6    pantoprazole (PROTONIX) 40 MG tablet, Take 1 tablet (40 mg total) by mouth 2 (two) times daily., Disp: 180 tablet, Rfl: 3    QUEtiapine (SEROQUEL) 50 MG tablet, Take 1  tablet (50 mg total) by mouth 2 (two) times daily., Disp: 180 tablet, Rfl: 1    syringe, disposable, 1 mL Syrg, 1 Stick by Misc.(Non-Drug; Combo Route) route once a week., Disp: 25 each, Rfl: 1  No current facility-administered medications for this visit.    Facility-Administered Medications Ordered in Other Visits:     mupirocin 2 % ointment, , Nasal, On Call Procedure, Kang Diaz MD, Given at 10/25/23 1045    tranexamic acid (CYKLOKAPRON) 1,000 mg in sodium chloride 0.9 % 100 mL IVPB (MB+), 1,000 mg, Intravenous, Once, Kang Diaz MD    tranexamic acid (CYKLOKAPRON) 1,000 mg in sodium chloride 0.9 % 100 mL IVPB (MB+), 1,000 mg, Intravenous, Once, Kang Diaz MD     Social History     Tobacco Use   Smoking Status Former    Current packs/day: 0.00    Average packs/day: 0.5 packs/day for 20.0 years (10.0 ttl pk-yrs)    Types: Cigarettes    Start date: 1979    Quit date: 1999    Years since quittin.9    Passive exposure: Past   Smokeless Tobacco Never        Review of patient's allergies indicates:   Allergen Reactions    Hydromorphone Anxiety, Other (See Comments) and Hallucinations     Severe agitation        ASSESSMENT:   Encounter Diagnoses   Name Primary?    Anxiety, unspecified, tho prominent generalized features (ie, worrier / chintan whenexacerbated by flare ups of  health conditions     Pulmonary emphysema, unspecified emphysema type Yes    Coronary artery disease involving native coronary artery of native heart without angina pectoris     Nicotine dependence, cigarettes, in FULL REMISSION:in past smoked for 17yrs from 16y to about 34y / LUPE:F pack a day     History of fracture of left hip     CKD stage G3a/A1, GFR 45-59 and albumin creatinine ratio <30 mg/g     Bilateral carotid artery stenosis     History Situational (ie Stress Anxiety Induced) REFLEX SYNCOPE syncope ([facility] 23     Subclinical hyperthyroidism      Patient Instructions     PLAN:   Follow UP Mar 26  2025 @ 2pm  IN CLINIC    Psyc meds: Seroquel 50 mg  TWICE a day #180 x1    We discussed her doing some counseling such as [facility] OR [facility] ([facility]) // as well as seeking [facility] counselor so as to attempt to have improved coping skills to manage her anxiety. MD encouraged her to call for more information 9as below       1)  [facility] program, [facility]: ([phone])   and ask to set up an intake eval    Website: ([email or web address]   (Copy and paste to your browser)     Current Conditions Treated:  PTSD  Insomnia  Panic Attacks  Depression  Anxiety  Chronic Pain  Stress    The [facility] is ideal for patients who:  Desire short-term rather than long-term psychotherapy  Want structured psychotherapy sessions for specific targets  Are willing to engage in daily practice assignments  Can commit to weekly psychotherapy for a time-limited period (usually between 6-12 weeks)    Note: Possibility of virtual  (telehealth) participation may exist    call for more information ([phone])     2) Individual Counseling:        A) Call [facility]              [phone] (there may be significant wait times)     B) Contact   your  insurance carrier:  via their Website or call and ask for assistance    C) Psychology Today: [email or web address]     References:     Relaxation stress reduction workbook: ABBY Freeman PhD ( used: $7-10)    Feeling Good Website: Danis Hernandez MD / [email or web address] website (free) / chintan. PODCASTS    Anxiety &  phobia workbook by AHMET Oneal PhD  (web retailers: used: $ 7-10)    [place]: Path to Better Sleep : https://www.veterantraining.va.gov/insomnia/ (free)     Pt expressed appreciation for the visit today and did not have further question at this time though pt  was still informed to:     Call  if  "problems.    Call / Report Side Effects to Psyc MD     Encouraged to follow up with primary care / Gen Med MD for continued monitoring of general health and wellness.    Understanding was expressed; and no further concerns nor questions were raised at this time.     Remember healthy self care:   eat right  attempt adequate rest   HANDWASHING / encourage such chintan. During this corona virus time   walk or light exercise within reason and as your general med team approves  read or explore any of reference materials / homework mentioned  reach out (I.e.,  connect with)  others who nuture and bring out best in you  avoid risky behaviors    Keep your appointments:    IF you  cannot make your appt THEN please call [phone]  or go online (via My Chart pedro) to reschedule.    It is the responsibility of the patient to reschedule an appointment if an appointment has been canceled or missed.    Avoid  alcohol and illicit substances.  Look for the positive.  All is often relative-seek balance  Call sooner if needed : [phone]  Call 911 or go to Emergency Room  (ER)  if  any acute concerns     >>Excerpt from Initial Psyc MD calixtoal from 4-17-24 <<    Jessica Floyd initially presented to psyc clinic as a 73 y.o. female (5' 3" and 116 lbs) sittingin wheelchair.  stayed in wait area tho seems caring well intended.     Pt was referred by [facility] pulmonologist: Alejandra WALLER.      She is a former smoker of 18 yrs (from 16y to 34y).     She has pulmonary emphysema; and reports getting heightened anxiety at times (often assoc with her pulmonary status and such has has led to situational reflex syncope.      Pt says such was originally diag in [place] yrs ago.     Says that she a had recent bout in 12- and [facility],     She does present as "worrier" / and seems to have anticipatory worry. .     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      She has Been  x 57y to [place] ("[place] HE was director prop [facility] for [employer]; did that 3 yrs     He retired and did facilities [employer] [facility] for credit [employer] ; based in [place] 12y and then he went head [facility] ; so moved [facility] x 6yrs/ for a time he was also placed [place] x 3 yrs while she had moved to [place].     Says when gets stressed nervous; she says was called reflex syncope; says has had since diag in [facility] (2015).     Says when was put on seroquel in Feb 2024 when in hospital ; says has not had ANY reflex syncopal    Excerpt of 12-25-23 note of [facility]ist Earline Hernández MD     * Acute hypoxic respiratory failure  Patient with Hypoxic Respiratory failure which is Acute on chronic. she is on home oxygen at 1L PRN LPM. Supplemental oxygen was provided and noted- Oxygen Concentration (%): [30] 30 Signs/symptoms of respiratory failure include- tachypnea, increased work of breathing, respiratory distress, and wheezing. Contributing diagnoses includes - Aspiration, CHF, COPD, and Pneumonia Labs and images were reviewed. Patient Has not had a recent ABG. Will treat underlying causes and adjust management of respiratory failure as follows     73-year-old female with a past medical history of former smoking, CAD, R Carotid stent, dyslipidemia, HTN, gout, anxiety, prev SAH (1999) COPD on home O2 (1L PRN), mild pulmonary hypertension, stress induced reflex syncope, history of ESBL UTI, now presenting with chief complaint of shortness of breath.  Patient reports that yesterday during the day she experienced worsening shortness of breath and cough.  She has had SOB for weeks, possibly months, and a lingering cough " "since her COVID infection last month but yesterday had an acute change. Xmas eric she was not feeling well, and around 6 or 7 pm she called ambulence. After some SOB at home and 3L didn't help. Was observed by  to be satting 81 or 82. Baseline is 94 or 95. Patient denies any hemoptysis, fevers, or sick contacts. Admits to SAMPSON, productive gray cough, chest tightness with exertion improved with rest, leg swelling, poor exercise tolerance, and balance issues. Reports weight loss of 20-30 lbs since 6 months. Vaxed for flu rsv, pna, and covid. Follows with pulm OP. Recent hospitalization 1 month ago for Covid.  claims pt "does not walk at all" and gets SOB after walking to the restroom. Uses asistance from others or cane / walker to ambulate.      EMS reports that patient had saturation of 80% on room air requiring 3 L nasal cannula.  Albuterol neb was given by EMS in addition to 125 mg of methylprednisone. In the Ed at presentation her vitals were: /100 P116 RR22 98.5F O2% 93, Initally requiring Bipap, now satting 97 with 8L NC s/p breathing treatment. WBC 31.6 H &H 10.7 33.1  Na 133 K 2.9 CO2 20 Albumin 2.6  Trop WNL Lactate WNL Flu A+ PCO2 33 Po 26 PHCO3 21.7 EKG? Sinus Tach around 115, T wave inversions V3-V5, II, III, AVF, Xray: Patchy right basilar consolidation may relate to infection/pneumonia or aspiration.  Clinical correlation and continued imaging follow-up is recommended. Procal +.      Receive Rocephin, Azithro, Mag, and Steroids in ED. Admitted to [facility] for management of Acute Hypoxic Respiratory Failure.         Plan  Q4Hr Breathing treatments  CAP coverage with Ceftriaxone and Azithro  Tamiflu for influenza   Lasix for volume overload 2/2 HFpEF  Continue home inhalers  Prednisone 40 mg qd  Ween O2 as needed      No psychosis     No SI no HI     No evidence of rekha     No substance use     Denies prior behavioral health treatment / says no counseling nor seeing psyc " "providers / did not santo on me     Past Psychiatric History:      Previous psychiatric treatment and medication trials: comes in on SEROQUEL 50 mg  Previous psychiatric hospitalizations: No  Previous diagnoses: No  Previous suicide attempts: No     Abuse History:   Physical Abuse: No  Sexual Abuse: No  Other Abuse / Trauma : No     Substance Abuse History:  Use of alcohol: one glass a day / no control issues  Tobacco use: FORMER SMOKER about 17yrs from 16y to about 34y / smoked LUPE:F pack a day  Cannabis: no  Recreational drugs: none     Family History Mental Health:   Suicide : No  Other: No     Weapons: No     Psyc Meds:   Seroquel 50 mg at bed     Top 3 Stressors: health pulmonary (says we koffi)      Cerebral aneurysm "CLIP in brian 1998" [facility]      Musculoskeletal : no tremors   History Seizures? Mini seizure and pass out / reflux syncope     History Head Injury? N tho has clip for aneurysm    3 wishes ? Health / stamina  Psychosocial History :     City Born: [facility]     Siblings (full or half)  Brothers: 1 who passed 67y health issues  Sisters: none     Parents :     Briefly Describe your Mom: hard working uneducated tho wished she could have been educated worked 2-3 / she was from [place]  Briefly Describe your Dad: [employer]IC [employer] / he was from [place] / (pt sponsored him for citizen when she 17y ; never abusive ; he was kind loving     Bio Mom: Occupation:  /     Bio Dad: Occupation: in [place]     Marital Status: one time / 57 yrs     Children   Girls (ages): three Hannah (54y / outside [place]/ psyc ); Aby (49y [employer] charter school and 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 none  DWI ?n  longterm time? n     Ever homeless? n     Employment:   Last Job? As below  Longest Job? 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

## 2024-11-26 RX ORDER — HYDROCHLOROTHIAZIDE 12.5 MG/1
12.5 CAPSULE ORAL
Qty: 90 CAPSULE | Refills: 0 | OUTPATIENT
Start: 2024-11-26

## 2024-11-26 NOTE — TELEPHONE ENCOUNTER
Refill Decision Note   Jessica Floyd  is requesting a refill authorization.  Brief Assessment and Rationale for Refill:  Quick Discontinue     Medication Therapy Plan: The original prescription was discontinued on 9/10/2024 by Michael Lal MD      Comments:     Note composed:5:41 PM 11/26/2024

## 2024-11-26 NOTE — TELEPHONE ENCOUNTER
No care due was identified.  [facility] Embedded Care Due Messages. Reference number: [id number].   11/26/2024 5:25:40 PM CST

## 2024-11-29 DIAGNOSIS — M10.9 GOUT, UNSPECIFIED: ICD-10-CM

## 2024-11-29 RX ORDER — COLCHICINE 0.6 MG/1
TABLET ORAL
Qty: 90 TABLET | Refills: 3 | Status: SHIPPED | OUTPATIENT
Start: 2024-11-29

## 2024-11-29 NOTE — TELEPHONE ENCOUNTER
Refill Routing Note   Medication(s) are not appropriate for processing by [facility] for the following reason(s):        No active prescription written by provider    [facility] action(s):  Defer               Appointments  past 12m or future 3m with PCP    Date Provider   Last Visit   10/23/2024 Jace Valencia MD   Next Visit   4/24/2025 Jace Valencia MD   ED visits in past 90 days: 0        Note composed:12:01 PM 11/29/2024

## 2024-11-29 NOTE — TELEPHONE ENCOUNTER
No care due was identified.  [facility] Embedded Care Due Messages. Reference number: [id number].   11/29/2024 8:56:15 AM CST

## 2024-12-16 ENCOUNTER — OFFICE VISIT (OUTPATIENT)
Dept: SPORTS MEDICINE | Facility: CLINIC | Age: 74
End: 2024-12-16
Payer: MEDICARE

## 2024-12-16 ENCOUNTER — LAB VISIT (OUTPATIENT)
Dept: LAB | Facility: OTHER | Age: 74
End: 2024-12-16
Attending: INTERNAL MEDICINE
Payer: MEDICARE

## 2024-12-16 VITALS
WEIGHT: 109.56 LBS | HEIGHT: 63 IN | BODY MASS INDEX: 19.41 KG/M2 | DIASTOLIC BLOOD PRESSURE: 84 MMHG | SYSTOLIC BLOOD PRESSURE: 141 MMHG | HEART RATE: 103 BPM

## 2024-12-16 DIAGNOSIS — M19.011 LOCALIZED PRIMARY OSTEOARTHRITIS OF RIGHT SHOULDER REGION: ICD-10-CM

## 2024-12-16 DIAGNOSIS — S46.011A TRAUMATIC COMPLETE TEAR OF RIGHT ROTATOR CUFF, INITIAL ENCOUNTER: Primary | ICD-10-CM

## 2024-12-16 DIAGNOSIS — M1A.0720 IDIOPATHIC CHRONIC GOUT OF LEFT FOOT WITHOUT TOPHUS: ICD-10-CM

## 2024-12-16 LAB — URATE SERPL-MCNC: 6.3 MG/DL (ref 2.4–5.7)

## 2024-12-16 PROCEDURE — 20611 DRAIN/INJ JOINT/BURSA W/US: CPT | Mod: S$GLB,TXP,, | Performed by: PHYSICIAN ASSISTANT

## 2024-12-16 PROCEDURE — 84550 ASSAY OF BLOOD/URIC ACID: CPT | Mod: TXP | Performed by: INTERNAL MEDICINE

## 2024-12-16 PROCEDURE — 1159F MED LIST DOCD IN RCRD: CPT | Mod: CPTII,S$GLB,TXP, | Performed by: PHYSICIAN ASSISTANT

## 2024-12-16 PROCEDURE — 3079F DIAST BP 80-89 MM HG: CPT | Mod: CPTII,S$GLB,TXP, | Performed by: PHYSICIAN ASSISTANT

## 2024-12-16 PROCEDURE — 1125F AMNT PAIN NOTED PAIN PRSNT: CPT | Mod: CPTII,S$GLB,TXP, | Performed by: PHYSICIAN ASSISTANT

## 2024-12-16 PROCEDURE — 99213 OFFICE O/P EST LOW 20 MIN: CPT | Mod: 25,S$GLB,TXP, | Performed by: PHYSICIAN ASSISTANT

## 2024-12-16 PROCEDURE — 3077F SYST BP >= 140 MM HG: CPT | Mod: CPTII,S$GLB,TXP, | Performed by: PHYSICIAN ASSISTANT

## 2024-12-16 PROCEDURE — 99999 PR PBB SHADOW E&M-EST. PATIENT-LVL IV: CPT | Mod: PBBFAC,TXP,, | Performed by: PHYSICIAN ASSISTANT

## 2024-12-16 PROCEDURE — 36415 COLL VENOUS BLD VENIPUNCTURE: CPT | Mod: TXP | Performed by: INTERNAL MEDICINE

## 2024-12-16 PROCEDURE — 1160F RVW MEDS BY RX/DR IN RCRD: CPT | Mod: CPTII,S$GLB,TXP, | Performed by: PHYSICIAN ASSISTANT

## 2024-12-16 RX ORDER — TRIAMCINOLONE ACETONIDE 40 MG/ML
40 INJECTION, SUSPENSION INTRA-ARTICULAR; INTRAMUSCULAR
Status: DISCONTINUED | OUTPATIENT
Start: 2024-12-16 | End: 2024-12-16 | Stop reason: HOSPADM

## 2024-12-16 RX ORDER — BUPIVACAINE HYDROCHLORIDE 2.5 MG/ML
2 INJECTION, SOLUTION INFILTRATION; PERINEURAL
Status: DISCONTINUED | OUTPATIENT
Start: 2024-12-16 | End: 2024-12-16 | Stop reason: HOSPADM

## 2024-12-16 RX ADMIN — BUPIVACAINE HYDROCHLORIDE 2 ML: 2.5 INJECTION, SOLUTION INFILTRATION; PERINEURAL at 10:12

## 2024-12-16 RX ADMIN — TRIAMCINOLONE ACETONIDE 40 MG: 40 INJECTION, SUSPENSION INTRA-ARTICULAR; INTRAMUSCULAR at 10:12

## 2024-12-16 NOTE — PROGRESS NOTES
ESTABLISHED PATIENT    History 12/16/2024:  Jessica returns here today for follow-up evaluation of her right shoulder.  She complains today of having recurrent pain due to her chronic rotator cuff tear and is requesting a repeat injection.  The previous injection gave her good relief and lasted nearly 3 months.  She is having increased pain over the last few weeks due to packing and moving into a new home.    History 9/16/2024:  Jessica returns here today for follow-up evaluation of her right shoulder.  She complains today of having recurrent pain due to her chronic rotator cuff tear and is requesting a repeat injection.    History 6/17/2024:  Jessica returns here today for follow-up evaluation of her right shoulder.  She complains today of having recurrent pain in his requesting a repeat injection.  She was recently hospitalized due to an incarcerated hernia and had to undergo urgent surgical correction with bowel resection.    History 2/28/2024:  Jessica returns here today for follow-up evaluation of her right shoulder.  She has rotator cuff tear arthropathy and has been treating her symptoms subjectively.  She was last given a subacromial injection in November which gave her temporary relief.  She complains today of having recurrent pain and is requesting a repeat injection.    History 11/29/2023:  Jessica returns to clinic today for follow-up of right shoulder pain.  She wants to discuss options for her right shoulder.  She has had a few significant events occurred recently.  She was initially booked for right reverse total shoulder arthroplasty but secondary to anxiety did not want to proceed with the surgery.  She subsequently had a fall and sustained a left hip fracture which required a left total hip arthroplasty.  Her primary complaint is still pain in the shoulder but she still has good active range of motion of the right shoulder.  She wants to discuss other minimally invasive options other than a reverse  total shoulder replacement.  She wants to discuss a balloon procedure as an option.    History 7/24/2023:  Jessica returns here today for follow-up evaluation of her right shoulder.  She has been attending physical therapy but continues to have constant right shoulder pain with limited range of motion and weakness.  Of note, she is still recovering from a recent total hip arthroplasty due to femoral neck fracture.    History 5/1/2023:   74 y.o. Female with a history of right shoulder pain who is referred today by Armani Cai PA-C. She is retired and she states she has been in physical therapy for a while for her knee and her shoulder. That has taken up most of her time. She has had a fall. She also had a incident a few months ago where she lifted herself up from a low toilet and she felt a pop in the shoulder. Armani gave her a CSI and she had some relief.         History 4/10/2023:  Jessica returns here today for follow-up evaluation of right shoulder.  She has been undergoing physical therapy for her large rotator cuff tear and glenohumeral joint osteoarthritis.  There has been improvement in her pain and function since her last visit.  She is still having difficulty performing simple ADLs such as cooking and trouble lifting objects overhead, but overall she seems to be very happy with her progress.    History 02/28/2023:  Jessica returns here today for follow-up of her right shoulder.  She has a large rotator cuff tear and underlying osteoarthritis.  She has previously failed a subacromial corticosteroid injection and we are attempting to manage her symptoms conservatively.  She was only able to attend 1 visit with physical therapy due to having uncontrollable epistaxis and having to go to the emergency room and ENT several times over the last 2 weeks.  She complains of having significant right shoulder pain and stiffness.    History 02/06/2023:  Jessica returns here today for follow-up evaluation of her right  shoulder.  She was given a subacromial corticosteroid injection at her last visit.  While attempting to lift herself up from a restroom toilet, she felt a pop in her shoulder and had immediate discomfort.  She was seen in the emergency room for this.  A CT arthrogram was ordered for further evaluation of her rotator cuff.  She is here today to discuss the results.  She has been using a shoulder sling intermittently for pain relief.    History 01/23/2023:  Jessica returns here today for follow-up evaluation of her right shoulder.  She states that the Medrol Dosepak gave her very minimal relief.  She has had worsening pain and weakness since her last visit.  She is having a lot of difficulty getting dressed and sleeping at night.  She has been taking Tylenol p.m. at night which has helped her get some sleep.    Of note, she states that her knee pain has resolved following the Orthovisc series.    History 01/10/2023:  72 y.o. Female with a history of right shoulder pain who began having pain 1 week ago.  She denies having any precipitating injury or trauma but states that she was moving a lot of boxes which may have irritated her shoulder.  She is having a lot of discomfort with movement and at night while sleeping.  She has had difficulty getting dressed due to the pain and is frequently dropping objects due to weakness.        Is affecting ADLs.  Pain is 7/10 at it's worst.      REVIEW OF SYSTEMS   Constitution: Negative. Negative for chills, fever and night sweats.   HENT: Negative for congestion and headaches.    Eyes: Negative for blurred vision, left vision loss and right vision loss.   Cardiovascular: Negative for chest pain and syncope.   Respiratory: Negative for cough and shortness of breath.    Endocrine: Negative for polydipsia, polyphagia and polyuria.   Hematologic/Lymphatic: Negative for bleeding problem. Does not bruise/bleed easily.   Skin: Negative for dry skin, itching and rash.   Musculoskeletal:  "Negative for falls. Positive for right shoulder pain  Gastrointestinal: Negative for abdominal pain and bowel incontinence.   Genitourinary: Negative for bladder incontinence and nocturia.   Neurological: Negative for disturbances in coordination, loss of balance and seizures.   Psychiatric/Behavioral: Negative for depression. The patient does not have insomnia.    Allergic/Immunologic: Negative for hives and persistent infections.     PHYSICAL EXAMINATION    Vitals: BP (!) 141/84   Pulse 103   Ht 5' 3" (1.6 m)   Wt 49.7 kg (109 lb 9.1 oz)   BMI 19.41 kg/m²     General: The patient appears active and healthy with no apparent physical problems.  No disturbance of mood or affect is demonstrated. Alert and Oriented.    HEENT: Eyes normal, pupils equally round, nose normal.    Resp: Equal and symmetrical chest rises. No wheezing    CV: Regular rate    Neck: Supple; nonpainful range of motion.    Extremities: no cyanosis, clubbing, edema, or diffuse swelling.  Palpable pulses, good capillary refill of the digits.  No coolness, discoloration, edema or obvious varicosities.    Skin: no lesions noted.    Lymphatic: no detected adenopathy in the upper or lower extremities.    Neurologic: normal mental status, normal reflexes, normal gait and balance.  Patient is alert and oriented to person, place and time.  No flaccidity or spasticity is noted.  No motor or sensory deficits are noted.  Light touch is intact    Orthopaedic:     SHOULDER EXAM - RIGHT    Inspection:   Normal skin color and appearance with no scars.  No muscle atrophy noted.  No scapular winging.      Palpation: No tenderness of the acromioclavicular joint, lateral edge of the acromion, biceps tendon, trapezius muscle or scapulothoracic bursa.  Crepitus of the glenohumeral joint    ROM:      PROM:     FE - 150°    Abd/ER -  80°  Abd/IR -  30°   Add/ER -  40°     AROM:    FE - 150°  with pain beyond 90°.  Abd/ER -  80°  Abd/IR -  30°   Add/ER -  40°  *pain " resisted ER       Tests:     - [place], - Neer's, - Cross Arm Adduction, - [place], - [place], - Speed. - Belly Press,  + Jobes, - Lift Off    Stability: - sulcus, - apprehension, - relocation, - load and shift, - DLS      Motor:  Rotator cuff strength is 4/5 supraspinatus, 4/5 infraspinatus, 5/5 subscapularis. Biceps, triceps and deltoid strength is 5/5.      Neuro     Distally there are no paresthesias, and sensation is intact to light touch in the median, ulnar, and radial distributions.  Reflexes are 2/2 biceps, triceps and brachioradialis.        IMAGING    CT Arthrogram Shoulder Right  Order: [id number]  Status: Final result     Visible to patient: Yes (seen)     Next appt: 07/26/2023 at 11:00 AM in Radiology ([facility] DEXA[facility])     Dx: Acute pain of right shoulder     0 Result Notes  Details    Reading Physician Reading Date Result Priority   Musa Maloney MD  [phone] 2/3/2023 Routine     Narrative & Impression  EXAMINATION:  CT ARTHROGRAM SHOULDER RIGHT     CLINICAL HISTORY:  Shoulder pain, rotator cuff disorder suspected, xray done;  Pain in right shoulder     TECHNIQUE:  Routine right multiplanar CT arthrogram shoulder performed after the intra-articular injection of contrast.     COMPARISON:  None     FINDINGS:  Rotator cuff: There is a large full-thickness rotator cuff tear involving the entire supra and infraspinatus tendons.  There is tendon retraction to the glenoid rim.  The teres minor and subscapularis are intact.  There is mild volume loss of both supra and infraspinatus muscles.  High-riding humeral head noted abutting the acromion.     Mild AC joint arthropathy.     The biceps tendon not identified, probably torn.     Generalized cartilage thinning/joint space narrowing noted with mild osteophytosis along the inferior margin of the humeral head.  No fractures or marrow replacement process.  No evidence of osteomyelitis.  No axillary lymphadenopathy.  Emphysematous changes noted in the right lung  apex.     Impression:     Large full-thickness rotator cuff tear, involving the infraspinatus and supraspinatus tendons, as above.     This report was flagged in Epic as abnormal.        Electronically signed by: Musa Maloney MD  Date:                                            02/03/2023  Time:                                           13:22           Exam Ended: 02/03/23 12:33 Last Resulted: 02/03/23 13:22           X-Ray Shoulder Complete 2 View Right  Order: [id number]  Status: Final result     Visible to patient: Yes (seen)     Next appt: 07/26/2023 at 11:00 AM in Radiology ([facility] D[facility])     Dx: Right shoulder pain     0 Result Notes  Details    Reading Physician Reading Date Result Priority   Myrna Hameed MD  [phone] 1/27/2023 STAT     Narrative & Impression  EXAMINATION:  XR SHOULDER COMPLETE 2 OR MORE VIEWS RIGHT     CLINICAL HISTORY:  Pain in right shoulder     TECHNIQUE:  Two views of the right shoulder were performed.     COMPARISON:  01/10/2023.     FINDINGS:  No evidence of acute displaced fracture, dislocation, or osseous destructive process.  Suspected remote healed fracture deformity is seen is seen of the right humeral head and neck.  Mild degenerative changes are seen at the glenohumeral and acromioclavicular joints.     Impression:     No acute osseous abnormality identified.        Electronically signed by: Myrna Hameed MD  Date:                                            01/27/2023  Time:                                           17:41           Exam Ended: 01/27/23 17:33 Last Resulted: 01/27/23 17:41               IMPRESSION       ICD-10-CM ICD-9-CM   1. Traumatic complete tear of right rotator cuff, initial encounter  S46.011A 840.4   2. Localized primary osteoarthritis of right shoulder region  M19.011 715.11             MEDICATIONS PRESCRIBED      None    RECOMMENDATIONS     Continue activity modifications  Repeat CSI of right sub-acromial bursa performed under ultrasound  guidance  RTC in 3 months if needed  I advised the patient that based on her current medical comorbidities that surgery right now may not be the best option.  She had relief with a subacromial injection.  I advised her that she can continue to do this for quite some time.  I also advised her she is not a candidate for a balloon procedure.  The only definitive surgery that would help improve her pain would be a reverse total shoulder arthroplasty.  However, I would reserve this procedure until she is medically optimized and subacromial injections have failed.        Large Joint Aspiration/Injection: R subacromial bursa    Date/Time: 12/16/2024 10:00 AM    Performed by: Armani Cai PA-C  Authorized by: Armani Cai PA-C    Consent Done?:  Yes (Verbal)  Indications:  Pain  Site marked: the procedure site was marked    Timeout: prior to procedure the correct patient, procedure, and site was verified    Prep: patient was prepped and draped in usual sterile fashion      Local anesthesia used?: Yes    Local anesthetic:  Co-phenylcaine spray    Details:  Needle Size:  22 G  Ultrasonic Guidance for needle placement?: Yes (Ultrasound guidance was used for needle localization.  Images were saved and stored for documentation.  Dynamic visualization of the needle was continuous throughout the procedure and maintained accurate placement)    Images are saved and documented.  Approach:  Lateral  Location:  Shoulder  Site:  R subacromial bursa  Medications:  2 mL BUPivacaine 0.25% (2.5 mg/ml) 0.25 % (2.5 mg/mL); 40 mg triamcinolone acetonide 40 mg/mL  Patient tolerance:  Patient tolerated the procedure well with no immediate complications      All questions were answered, pt will contact us for questions or concerns in the interim.

## 2024-12-27 ENCOUNTER — PATIENT MESSAGE (OUTPATIENT)
Dept: GASTROENTEROLOGY | Facility: CLINIC | Age: 74
End: 2024-12-27
Payer: MEDICARE

## 2025-01-14 DIAGNOSIS — Z98.890 OTHER SPECIFIED POSTPROCEDURAL STATES: ICD-10-CM

## 2025-01-14 NOTE — TELEPHONE ENCOUNTER
No care due was identified.  [facility] Embedded Care Due Messages. Reference number: [id number].   1/14/2025 12:05:10 AM CST

## 2025-01-14 NOTE — TELEPHONE ENCOUNTER
Refill Routing Note   Medication(s) are not appropriate for processing by [facility] for the following reason(s):        Outside of protocol    [facility] action(s):  Route               Appointments  past 12m or future 3m with PCP    Date Provider   Last Visit   10/23/2024 Jace Valencia MD   Next Visit   4/24/2025 Jace Valencia MD   ED visits in past 90 days: 0        Note composed:10:05 AM 01/14/2025

## 2025-01-15 RX ORDER — CELECOXIB 200 MG/1
CAPSULE ORAL
Qty: 30 CAPSULE | Refills: 2 | Status: SHIPPED | OUTPATIENT
Start: 2025-01-15

## 2025-01-15 RX ORDER — HYDROCHLOROTHIAZIDE 12.5 MG/1
12.5 CAPSULE ORAL
Qty: 90 CAPSULE | Refills: 0 | OUTPATIENT
Start: 2025-01-15

## 2025-01-15 NOTE — TELEPHONE ENCOUNTER
Refill Decision Note   Jessica Floyd  is requesting a refill authorization.  Brief Assessment and Rationale for Refill:  Quick Discontinue     Medication Therapy Plan: HCTZ increased to 25 mg tab on 9/10/24      Comments:     Note composed:12:03 PM 01/15/2025

## 2025-01-15 NOTE — TELEPHONE ENCOUNTER
No care due was identified.  [facility] Embedded Care Due Messages. Reference number: [id number].   1/15/2025 10:26:31 AM CST

## 2025-01-19 NOTE — TELEPHONE ENCOUNTER
No care due was identified.  [facility] Embedded Care Due Messages. Reference number: [id number].   1/19/2025 7:05:58 AM CST

## 2025-01-21 ENCOUNTER — PATIENT MESSAGE (OUTPATIENT)
Dept: INTERNAL MEDICINE | Facility: CLINIC | Age: 75
End: 2025-01-21
Payer: MEDICARE

## 2025-01-21 RX ORDER — HYDROCHLOROTHIAZIDE 12.5 MG/1
12.5 CAPSULE ORAL
Qty: 90 CAPSULE | Refills: 0 | OUTPATIENT
Start: 2025-01-21

## 2025-01-21 NOTE — TELEPHONE ENCOUNTER
Refill Decision Note  Antonino DC. Inappropriate Request     Jessica Floyd  is requesting a refill authorization.  Brief Assessment and Rationale for Refill:  Quick Discontinue     Medication Therapy Plan:  prescription was discontinued on 9/10/2024 by Michael Lal MD.    Medication Reconciliation Completed: No   Comments:           Note composed:7:04 AM 01/21/2025

## 2025-01-22 RX ORDER — CYANOCOBALAMIN 1000 UG/ML
INJECTION, SOLUTION INTRAMUSCULAR; SUBCUTANEOUS
Qty: 10 ML | Refills: 0 | Status: SHIPPED | OUTPATIENT
Start: 2025-01-22

## 2025-01-31 DIAGNOSIS — J43.9 PULMONARY EMPHYSEMA, UNSPECIFIED EMPHYSEMA TYPE: Primary | ICD-10-CM

## 2025-01-31 NOTE — PROGRESS NOTES
Per provider note  Yara WALLS, patient RTC February 2025 with PFTs, 6MWT. Start on room air, modified. Request to .

## 2025-02-03 ENCOUNTER — LAB VISIT (OUTPATIENT)
Dept: LAB | Facility: OTHER | Age: 75
End: 2025-02-03
Attending: INTERNAL MEDICINE
Payer: MEDICARE

## 2025-02-03 DIAGNOSIS — D53.9 MACROCYTIC ANEMIA: ICD-10-CM

## 2025-02-03 LAB
ALBUMIN SERPL BCP-MCNC: 3 G/DL (ref 3.5–5.2)
ALP SERPL-CCNC: 98 U/L (ref 40–150)
ALT SERPL W/O P-5'-P-CCNC: 32 U/L (ref 10–44)
ANION GAP SERPL CALC-SCNC: 10 MMOL/L (ref 8–16)
ANISOCYTOSIS BLD QL SMEAR: SLIGHT
AST SERPL-CCNC: 32 U/L (ref 10–40)
BASOPHILS # BLD AUTO: 0.28 K/UL (ref 0–0.2)
BASOPHILS NFR BLD: 1.7 % (ref 0–1.9)
BILIRUB SERPL-MCNC: 0.7 MG/DL (ref 0.1–1)
BUN SERPL-MCNC: 21 MG/DL (ref 8–23)
CALCIUM SERPL-MCNC: 9.3 MG/DL (ref 8.7–10.5)
CHLORIDE SERPL-SCNC: 104 MMOL/L (ref 95–110)
CO2 SERPL-SCNC: 25 MMOL/L (ref 23–29)
CREAT SERPL-MCNC: 0.9 MG/DL (ref 0.5–1.4)
DACRYOCYTES BLD QL SMEAR: ABNORMAL
DIFFERENTIAL METHOD BLD: ABNORMAL
EOSINOPHIL # BLD AUTO: 0.7 K/UL (ref 0–0.5)
EOSINOPHIL NFR BLD: 4.5 % (ref 0–8)
ERYTHROCYTE [DISTWIDTH] IN BLOOD BY AUTOMATED COUNT: 18.6 % (ref 11.5–14.5)
EST. GFR  (NO RACE VARIABLE): >60 ML/MIN/1.73 M^2
FERRITIN SERPL-MCNC: 229 NG/ML (ref 20–300)
FOLATE SERPL-MCNC: 7.7 NG/ML (ref 4–24)
GLUCOSE SERPL-MCNC: 99 MG/DL (ref 70–110)
HCT VFR BLD AUTO: 37.4 % (ref 37–48.5)
HGB BLD-MCNC: 12 G/DL (ref 12–16)
IMM GRANULOCYTES # BLD AUTO: 0.39 K/UL (ref 0–0.04)
IMM GRANULOCYTES NFR BLD AUTO: 2.4 % (ref 0–0.5)
IRON SERPL-MCNC: 67 UG/DL (ref 30–160)
LYMPHOCYTES # BLD AUTO: 1.9 K/UL (ref 1–4.8)
LYMPHOCYTES NFR BLD: 11.3 % (ref 18–48)
MCH RBC QN AUTO: 33.6 PG (ref 27–31)
MCHC RBC AUTO-ENTMCNC: 32.1 G/DL (ref 32–36)
MCV RBC AUTO: 105 FL (ref 82–98)
MONOCYTES # BLD AUTO: 3.1 K/UL (ref 0.3–1)
MONOCYTES NFR BLD: 18.8 % (ref 4–15)
NEUTROPHILS # BLD AUTO: 10.1 K/UL (ref 1.8–7.7)
NEUTROPHILS NFR BLD: 61.3 % (ref 38–73)
NRBC BLD-RTO: 0 /100 WBC
OVALOCYTES BLD QL SMEAR: ABNORMAL
PLATELET # BLD AUTO: 386 K/UL (ref 150–450)
PLATELET BLD QL SMEAR: ABNORMAL
PMV BLD AUTO: 9.8 FL (ref 9.2–12.9)
POIKILOCYTOSIS BLD QL SMEAR: SLIGHT
POTASSIUM SERPL-SCNC: 3.4 MMOL/L (ref 3.5–5.1)
PROT SERPL-MCNC: 7.6 G/DL (ref 6–8.4)
RBC # BLD AUTO: 3.57 M/UL (ref 4–5.4)
SATURATED IRON: 22 % (ref 20–50)
SODIUM SERPL-SCNC: 139 MMOL/L (ref 136–145)
TOTAL IRON BINDING CAPACITY: 309 UG/DL (ref 250–450)
TRANSFERRIN SERPL-MCNC: 209 MG/DL (ref 200–375)
WBC # BLD AUTO: 16.43 K/UL (ref 3.9–12.7)

## 2025-02-03 PROCEDURE — 84165 PROTEIN E-PHORESIS SERUM: CPT | Mod: TXP | Performed by: INTERNAL MEDICINE

## 2025-02-03 PROCEDURE — 83521 IG LIGHT CHAINS FREE EACH: CPT | Mod: 59,TXP | Performed by: INTERNAL MEDICINE

## 2025-02-03 PROCEDURE — 84165 PROTEIN E-PHORESIS SERUM: CPT | Mod: 26,NTX,, | Performed by: PATHOLOGY

## 2025-02-03 PROCEDURE — 84466 ASSAY OF TRANSFERRIN: CPT | Mod: TXP | Performed by: INTERNAL MEDICINE

## 2025-02-03 PROCEDURE — 85025 COMPLETE CBC W/AUTO DIFF WBC: CPT | Mod: TXP | Performed by: INTERNAL MEDICINE

## 2025-02-03 PROCEDURE — 83921 ORGANIC ACID SINGLE QUANT: CPT | Mod: TXP | Performed by: INTERNAL MEDICINE

## 2025-02-03 PROCEDURE — 80053 COMPREHEN METABOLIC PANEL: CPT | Mod: TXP | Performed by: INTERNAL MEDICINE

## 2025-02-03 PROCEDURE — 82746 ASSAY OF FOLIC ACID SERUM: CPT | Mod: TXP | Performed by: INTERNAL MEDICINE

## 2025-02-03 PROCEDURE — 86334 IMMUNOFIX E-PHORESIS SERUM: CPT | Mod: 26,NTX,, | Performed by: PATHOLOGY

## 2025-02-03 PROCEDURE — 36415 COLL VENOUS BLD VENIPUNCTURE: CPT | Mod: TXP | Performed by: INTERNAL MEDICINE

## 2025-02-03 PROCEDURE — 84238 ASSAY NONENDOCRINE RECEPTOR: CPT | Mod: TXP | Performed by: INTERNAL MEDICINE

## 2025-02-03 PROCEDURE — 86334 IMMUNOFIX E-PHORESIS SERUM: CPT | Mod: TXP | Performed by: INTERNAL MEDICINE

## 2025-02-03 PROCEDURE — 82728 ASSAY OF FERRITIN: CPT | Mod: TXP | Performed by: INTERNAL MEDICINE

## 2025-02-03 NOTE — TELEPHONE ENCOUNTER
No care due was identified.  [facility] Embedded Care Due Messages. Reference number: [id number].   2/03/2025 4:19:07 PM CST

## 2025-02-04 ENCOUNTER — OFFICE VISIT (OUTPATIENT)
Dept: HEMATOLOGY/ONCOLOGY | Facility: CLINIC | Age: 75
End: 2025-02-04
Payer: MEDICARE

## 2025-02-04 VITALS
HEIGHT: 63 IN | OXYGEN SATURATION: 95 % | HEART RATE: 81 BPM | TEMPERATURE: 98 F | DIASTOLIC BLOOD PRESSURE: 64 MMHG | WEIGHT: 106.94 LBS | BODY MASS INDEX: 18.95 KG/M2 | RESPIRATION RATE: 18 BRPM | SYSTOLIC BLOOD PRESSURE: 121 MMHG

## 2025-02-04 DIAGNOSIS — K55.9 ISCHEMIA, BOWEL: ICD-10-CM

## 2025-02-04 DIAGNOSIS — D72.10 EOSINOPHILIA, UNSPECIFIED TYPE: ICD-10-CM

## 2025-02-04 DIAGNOSIS — D72.821 MONOCYTOSIS: ICD-10-CM

## 2025-02-04 DIAGNOSIS — I70.0 AORTIC ATHEROSCLEROSIS: ICD-10-CM

## 2025-02-04 DIAGNOSIS — J43.9 PULMONARY EMPHYSEMA, UNSPECIFIED EMPHYSEMA TYPE: Chronic | ICD-10-CM

## 2025-02-04 DIAGNOSIS — Z12.31 ENCOUNTER FOR SCREENING MAMMOGRAM FOR MALIGNANT NEOPLASM OF BREAST: ICD-10-CM

## 2025-02-04 DIAGNOSIS — I50.30 HEART FAILURE WITH PRESERVED EJECTION FRACTION, UNSPECIFIED HF CHRONICITY: ICD-10-CM

## 2025-02-04 DIAGNOSIS — D72.829 LEUKOCYTOSIS, UNSPECIFIED TYPE: ICD-10-CM

## 2025-02-04 DIAGNOSIS — N18.31 CKD STAGE G3A/A1, GFR 45-59 AND ALBUMIN CREATININE RATIO <30 MG/G: ICD-10-CM

## 2025-02-04 DIAGNOSIS — Z00.00 HEALTHCARE MAINTENANCE: ICD-10-CM

## 2025-02-04 DIAGNOSIS — R63.4 WEIGHT LOSS: ICD-10-CM

## 2025-02-04 DIAGNOSIS — D53.9 MACROCYTIC ANEMIA: Primary | ICD-10-CM

## 2025-02-04 LAB
ALBUMIN SERPL ELPH-MCNC: 3.38 G/DL (ref 3.35–5.55)
ALPHA1 GLOB SERPL ELPH-MCNC: 0.36 G/DL (ref 0.17–0.41)
ALPHA2 GLOB SERPL ELPH-MCNC: 0.79 G/DL (ref 0.43–0.99)
B-GLOBULIN SERPL ELPH-MCNC: 0.77 G/DL (ref 0.5–1.1)
GAMMA GLOB SERPL ELPH-MCNC: 1.9 G/DL (ref 0.67–1.58)
INTERPRETATION SERPL IFE-IMP: NORMAL
KAPPA LC SER QL IA: 7.69 MG/DL (ref 0.33–1.94)
KAPPA LC/LAMBDA SER IA: 1.12 (ref 0.26–1.65)
LAMBDA LC SER QL IA: 6.84 MG/DL (ref 0.57–2.63)
PROT SERPL-MCNC: 7.2 G/DL (ref 6–8.4)

## 2025-02-04 PROCEDURE — 1159F MED LIST DOCD IN RCRD: CPT | Mod: CPTII,S$GLB,, | Performed by: INTERNAL MEDICINE

## 2025-02-04 PROCEDURE — 3288F FALL RISK ASSESSMENT DOCD: CPT | Mod: CPTII,S$GLB,, | Performed by: INTERNAL MEDICINE

## 2025-02-04 PROCEDURE — 1160F RVW MEDS BY RX/DR IN RCRD: CPT | Mod: CPTII,S$GLB,, | Performed by: INTERNAL MEDICINE

## 2025-02-04 PROCEDURE — 1125F AMNT PAIN NOTED PAIN PRSNT: CPT | Mod: CPTII,S$GLB,, | Performed by: INTERNAL MEDICINE

## 2025-02-04 PROCEDURE — 3074F SYST BP LT 130 MM HG: CPT | Mod: CPTII,S$GLB,, | Performed by: INTERNAL MEDICINE

## 2025-02-04 PROCEDURE — 99215 OFFICE O/P EST HI 40 MIN: CPT | Mod: S$GLB,,, | Performed by: INTERNAL MEDICINE

## 2025-02-04 PROCEDURE — 1101F PT FALLS ASSESS-DOCD LE1/YR: CPT | Mod: CPTII,S$GLB,, | Performed by: INTERNAL MEDICINE

## 2025-02-04 PROCEDURE — 3078F DIAST BP <80 MM HG: CPT | Mod: CPTII,S$GLB,, | Performed by: INTERNAL MEDICINE

## 2025-02-04 PROCEDURE — 99999 PR PBB SHADOW E&M-EST. PATIENT-LVL V: CPT | Mod: PBBFAC,,, | Performed by: INTERNAL MEDICINE

## 2025-02-04 RX ORDER — HYDROCHLOROTHIAZIDE 12.5 MG/1
12.5 CAPSULE ORAL
Qty: 90 CAPSULE | Refills: 0 | OUTPATIENT
Start: 2025-02-04

## 2025-02-04 NOTE — PROGRESS NOTES
[facility]     Outpatient Hematology/Medical Oncology Note  Patient: Jessica Floyd  MRN: [id number]  Primary Care Provider: Jace Valencia MD  Chief Complaint: Macrocytic Anemia, Leukocytosis    Subjective     Subjective:   HPI:   Jessica Floyd is a 74 y.o. 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 She presents today for follow up.     HPI:  Feb 2023: presented to the ED with significant epistaxis  April 2023 :ENT - diagnosed with chronic maxillary sinusitis and nasal polyposis  March 2023: first appointment with Hematology for new onset ERIK of unclear cause   Nov and Dec 2023: admissions for COVID and influenza with subsequent COPD exacerbations   5/14 - 6/3/2024: presented with acute GI bleed   5/14/24: CTA: acute bleed at the level of the cecum - incidental findings: severe plaque burden at bilateral common iliac artery and bilateral common femoral artery   5/15/24: EGD: 2 cm hiatal hernia, otherwise unremarkable; 5/16/24: Colonoscopy: poor prep - clotted blood in entire colon; unable to visualize colon  5/14/24: 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  5/17/24: Given continued pain after the embolization, she underwent exploratory laparotomy with findings of necrotic small bowel in incarcerated left inguinal hernia s/p small bowel resection and tissue repair of inguinal hernias  (through the same segment of bowel that had coils in the vasculature)    Hematology History:  10/2022: first noted to be anemic - Hgb Range: 8.5 - 12 range, MCV: 105, WBC and platelets: wnl (WBC elevated in setting of recent viral illnesses, anemia nadiring after L THR and hospitalizations for COVID, flu, CAP, ER visits for significant epistaxis)  Treatment: IV Iron - (venofer): May 2023 (5/8, 5/15, 5/22, 6/22, 7/6), Sept 2023 (9/19, 9/28, 10/6), May 2024 (5/10/24), August 2024 (8/13/2024)    Interval History:    Ms. Floyd is unaccompanied today. She is able to do most ADLs on her own, can walk without assistance at home but does require a walker for long distances.      (1) Unintentional weight loss: She is overall better after her hospitalization last year for an SBO. However, her main concern today is inability to gain weight. She typically weighs around 130 lbs. Over the last year and half, she has lost about 25 lbs unintentionally, currently weighing 106 lbs. No other GI symptoms or fevers, nightsweats, adenopathy, etc.    (2) Epistaxis: She had a significant episode of epistaxis in 2/2023 (see ENT note on 2/22/23) requiring rhino rocket. Since then, her epistaxis has been mild, last episode on 3/23, lasted a few hours. No other clinical bleeding - denies GI bleeding, hematuria, vaginal bleeding etc. She was previously on PO iron but stopped in 2023 due to constipation     Patient otherwise denies GI sx - no further episodes of bleeding, bowel movements have now normalized, denies fatigue, fevers, chills, night sweats, headaches, chest pain, SOB, cough, abdominal pain, N/V, diarrhea, constipation, rashes.     Review of Systems:  14-point review of systems was asked with the pertinent positives and/or negatives stated in HPI.     Past Medical History:   Nonobstructive CAD, HLD, HTN (HCTZ)/CKD, COPD/PH on TTE (on 2L home )2 after COVID in 11/2023), HFpEF, SAH (2/2 to ruptured aneurysm in 1999), Right Carotid  artery stent for complication (iatrogenic rupture during cerebral angiography), Gout, R Rotator Cuff Tear, GERD,     Past Surgical History:   - Incarcerated inguinal hernia s/p necrotic small bowel resection (5/17/24)  - SAH due to ruptured aneurysm (1999 s/p clipping) c/b right carotid artery dissection requiring a stent, Left femur fracture after mechanical fall s/p L THR (5/2023)    Family History:   - No known family history of cancer     Social History:   - originally from the [place], 3 daughters (1 daughter in [facility], 1 in [place] and 1 in [place]) and 4 granddaughters   - previously lived in [facility], moved to [place] in her early 60s  - Smoking History: Former, quit in her 50s, started in her 20s, about 2 cigarettes per day  - Alcohol: socially, red wine   - Illicit Drug Use: denies    reports that she quit smoking about 26 years ago. Her smoking use included cigarettes. She started smoking about 46 years ago. She has a 10 pack-year smoking history. She has been exposed to tobacco smoke. She has never used smokeless tobacco. She reports current alcohol use of about 14.0 standard drinks of alcohol per week. She reports that she does not use drugs.    Allergies:  Review of patient's allergies indicates:   Allergen Reactions    Hydromorphone Anxiety, Other (See Comments) and Hallucinations     Severe agitation       Medications:  Current Outpatient Medications   Medication Sig Dispense Refill    allopurinoL (ZYLOPRIM) 100 MG tablet Take 2 tablets (200 mg total) by mouth once daily. 60 tablet 11    amLODIPine (NORVASC) 5 MG tablet Take 1 tablet (5 mg total) by mouth once daily.      atorvastatin (LIPITOR) 80 MG tablet Take 1 tablet (80 mg total) by mouth once daily. 90 tablet 3    carvediloL (COREG) 6.25 MG tablet Take 1 tablet (6.25 mg total) by mouth 2 (two) times daily. 180 tablet 3    celecoxib (CELEBREX) 200 MG capsule TAKE 1 CAPSULE BY MOUTH DAILY AS NEEDED FOR PAIN. 30 capsule 2    colchicine  (COLCRYS) 0.6 mg tablet TAKE 1 TABLET (0.6 MG TOTAL) BY MOUTH ONCE DAILY. AS NEEDED FOR GOUT 90 tablet 3    cyanocobalamin 1,000 mcg/mL injection Inject 1,000 mcg into the muscle every 7 days. 10 mL 0    fluticasone propionate (FLONASE) 50 mcg/actuation nasal spray 2 sprays (100 mcg total) by Each Nostril route once daily.      fluticasone-umeclidin-vilanter (TRELEGY ELLIPTA) 200-62.5-25 mcg inhaler Inhale 1 puff into the lungs once daily. 60 each 11    hydroCHLOROthiazide (HYDRODIURIL) 25 MG tablet Take 1 tablet (25 mg total) by mouth once daily. 90 tablet 3    LIDOcaine 4 % Gel Apply 1 g topically 2 (two) times daily as needed (foot pain).      LIDOcaine-prilocaine (EMLA) cream Apply topically as needed. 30 g 3    montelukast (SINGULAIR) 10 mg tablet Take 1 tablet (10 mg total) by mouth every evening. 30 tablet 6    pantoprazole (PROTONIX) 40 MG tablet Take 1 tablet (40 mg total) by mouth 2 (two) times daily. 180 tablet 3    QUEtiapine (SEROQUEL) 50 MG tablet Take 1 tablet (50 mg total) by mouth 2 (two) times daily. 180 tablet 1    syringe, disposable, 1 mL Syrg 1 Stick by Misc.(Non-Drug; Combo Route) route once a week. 25 each 1     No current facility-administered medications for this visit.     Facility-Administered Medications Ordered in Other Visits   Medication Dose Route Frequency Provider Last Rate Last Admin    mupirocin 2 % ointment   Nasal On Call Procedure Kang Diaz MD   Given at 10/25/23 1045    tranexamic acid (CYKLOKAPRON) 1,000 mg in sodium chloride 0.9 % 100 mL IVPB (MB+)  1,000 mg Intravenous Once Kang Diaz MD        tranexamic acid (CYKLOKAPRON) 1,000 mg in sodium chloride 0.9 % 100 mL IVPB (MB+)  1,000 mg Intravenous Once Kang Diaz MD              Objective:   Vitals:   Vitals:    02/04/25 0921   BP: 121/64   BP Location: Right arm   Patient Position: Sitting   Pulse: 81   Resp: 18   Temp: 98 °F (36.7 °C)   TempSrc: Oral   SpO2: 95%   Weight: 48.5 kg (106 lb 14.8 oz)  "  Height: 5' 3" (1.6 m)       BMI: Body mass index is 18.94 kg/m².    Physical Exam  ECOG Performance Status:  2  General Appearance:    Alert, cooperative, no distress    Head:    Normocephalic, without obvious abnormality, atraumatic   Throat:   Lips, mucosa, and tongue normal; teeth and gums normal   Neck:   Supple, symmetrical, no adenopathy;     thyroid:  no enlargement/tenderness/nodules   Lungs:     Clear to auscultation bilaterally, respirations unlabored    Heart:    Regular rate and rhythm, S1 and S2 normal   Abdomen:     Soft, non-tender, bowel sounds active, no masses, no organomegaly   Extremities:   Extremities normal, atraumatic, no cyanosis or edema   Pulses:   2+ and symmetric all extremities   Skin:   Skin color, texture, turgor normal, no rashes or lesions   Lymph nodes:   Cervical, supraclavicular, and axillary nodes normal   Neurologic:   Generalized weaknes, normal sensation     Laboratory Data:  Lab Visit on 02/03/2025   Component Date Value Ref Range Status    WBC 02/03/2025 16.43 (H)  3.90 - 12.70 K/uL Final    RBC 02/03/2025 3.57 (L)  4.00 - 5.40 M/uL Final    Hemoglobin 02/03/2025 12.0  12.0 - 16.0 g/dL Final    Hematocrit 02/03/2025 37.4  37.0 - 48.5 % Final    MCV 02/03/2025 105 (H)  82 - 98 fL Final    MCH 02/03/2025 33.6 (H)  27.0 - 31.0 pg Final    MCHC 02/03/2025 32.1  32.0 - 36.0 g/dL Final    RDW 02/03/2025 18.6 (H)  11.5 - 14.5 % Final    Platelets 02/03/2025 386  150 - 450 K/uL Final    MPV 02/03/2025 9.8  9.2 - 12.9 fL Final    Immature Granulocytes 02/03/2025 2.4 (H)  0.0 - 0.5 % Final    Gran # (ANC) 02/03/2025 10.1 (H)  1.8 - 7.7 K/uL Final    Immature Grans (Abs) 02/03/2025 0.39 (H)  0.00 - 0.04 K/uL Final    Comment: Mild elevation in immature granulocytes is non specific and   can be seen in a variety of conditions including stress response,   acute inflammation, trauma and pregnancy. Correlation with other   laboratory and clinical findings is essential.      Lymph # " 02/03/2025 1.9  1.0 - 4.8 K/uL Final    Mono # 02/03/2025 3.1 (H)  0.3 - 1.0 K/uL Final    Eos # 02/03/2025 0.7 (H)  0.0 - 0.5 K/uL Final    Baso # 02/03/2025 0.28 (H)  0.00 - 0.20 K/uL Final    nRBC 02/03/2025 0  0 /100 WBC Final    Gran % 02/03/2025 61.3  38.0 - 73.0 % Final    Lymph % 02/03/2025 11.3 (L)  18.0 - 48.0 % Final    Mono % 02/03/2025 18.8 (H)  4.0 - 15.0 % Final    Eosinophil % 02/03/2025 4.5  0.0 - 8.0 % Final    Basophil % 02/03/2025 1.7  0.0 - 1.9 % Final    Platelet Estimate 02/03/2025 Appears normal   Final    Aniso 02/03/2025 Slight   Final    Poik 02/03/2025 Slight   Final    Ovalocytes 02/03/2025 Occasional   Final    Tear Drop Cells 02/03/2025 Occasional   Final    Differential Method 02/03/2025 Automated   Final    Sodium 02/03/2025 139  136 - 145 mmol/L Final    Potassium 02/03/2025 3.4 (L)  3.5 - 5.1 mmol/L Final    Chloride 02/03/2025 104  95 - 110 mmol/L Final    CO2 02/03/2025 25  23 - 29 mmol/L Final    Glucose 02/03/2025 99  70 - 110 mg/dL Final    BUN 02/03/2025 21  8 - 23 mg/dL Final    Creatinine 02/03/2025 0.9  0.5 - 1.4 mg/dL Final    Calcium 02/03/2025 9.3  8.7 - 10.5 mg/dL Final    Total Protein 02/03/2025 7.6  6.0 - 8.4 g/dL Final    Albumin 02/03/2025 3.0 (L)  3.5 - 5.2 g/dL Final    Total Bilirubin 02/03/2025 0.7  0.1 - 1.0 mg/dL Final    Comment: For infants and newborns, interpretation of results should be based  on gestational age, weight and in agreement with clinical  observations.    Premature Infant recommended reference ranges:  Up to 24 hours.............<8.0 mg/dL  Up to 48 hours............<12.0 mg/dL  3-5 days..................<15.0 mg/dL  6-29 days.................<15.0 mg/dL      Alkaline Phosphatase 02/03/2025 98  40 - 150 U/L Final    AST 02/03/2025 32  10 - 40 U/L Final    ALT 02/03/2025 32  10 - 44 U/L Final    eGFR 02/03/2025 >60  >60 mL/min/1.73 m^2 Final    Anion Gap 02/03/2025 10  8 - 16 mmol/L Final    Ferritin 02/03/2025 229  20.0 - 300.0 ng/mL Final     Iron 02/03/2025 67  30 - 160 ug/dL Final    Transferrin 02/03/2025 209  200 - 375 mg/dL Final    TIBC 02/03/2025 309  250 - 450 ug/dL Final    Saturated Iron 02/03/2025 22  20 - 50 % Final    Folate 02/03/2025 7.7  4.0 - 24.0 ng/mL Final    Protein, Serum 02/03/2025 7.2  6.0 - 8.4 g/dL Final    Comment: Serum protein electrophoresis and immunofixation results should be   interpreted in clinical context in that some therapeutic agents can   result   in false positive results (example, daratumumab). Correlation with   the   patient s therapeutic regimen is required.      Albumin 02/03/2025 3.38  3.35 - 5.55 g/dL Final    Alpha-1 02/03/2025 0.36  0.17 - 0.41 g/dL Final    Alpha-2 02/03/2025 0.79  0.43 - 0.99 g/dL Final    Beta 02/03/2025 0.77  0.50 - 1.10 g/dL Final    Gamma 02/03/2025 1.90 (H)  0.67 - 1.58 g/dL Final    Immunofix Interp. 02/03/2025 SEE COMMENT   Final    Comment: Serum protein electrophoresis and immunofixation results should be   interpreted in clinical context in that some therapeutic agents can   result   in false positive results (example, daratumumab). Correlation with   the   patient s therapeutic regimen is required.  See pathologist's interpretation.      Prue Free Light Chains 02/03/2025 7.69 (H)  0.33 - 1.94 mg/dL Final    Lambda Free Light Chains 02/03/2025 6.84 (H)  0.57 - 2.63 mg/dL Final    Kappa/Lambda FLC Ratio 02/03/2025 1.12  0.26 - 1.65 Final    Comment: Undetected antigen excess is a rare event but cannot   be excluded. If these free light chain results do not   agree with other clinical or laboratory findings or   if the sample is from a patient that has previously   demonstrated antigen excess, discuss with the testing   laboratory.   Results should always be interpreted in conjunction   with other laboratory tests and clinical evidence.       Labs:   March 2024:   - WBC: 14, Hgb: 10.3, MCV: 110, platelets: 387, WBC: 14 (ANC: 8500), TSAT: 10%, Ferritin: 24 (March 2023) to 49  (2023), 611 (2023), now 69,  folate : 14, B12: 586, MMA: 1.72, retic: 7.6, smear: unremarkable (moderate anisopoikilocytosis, leukocytosis w/ reactive changes), Cr: 0.8, GFR: 60, LFTs: wnl, KF.5, LFLC: 3.99, Ratio: wnl, HIV and hepatitis testing: negative, haptoglobin: 264 (2023), retic: 4 (2023)     2024:   - WBC: 13, Hgb: 9.8, MCV: 100, platelets: 302, monocytes: 23% (3100),CMP: unremarkable - Cr: 1.3, GFR: 43, TSAT: 4%, STFR: 7.4, Ferritn: 135, B12: 1830, MMA: 0.46 (1.72), copper: 1445, zinc: 40, SPEP/RADHA: wnl, k flc; 9.68, L FLC: 10,67, Ratio: 0.92    2025:   - WBC: 16 (22), ANC: 10,000, monocytes; 3000, eosinphils: 700, basophils; 280, Hgb: 12, MCV: 105, platelets: 386  - CMP: Cr: 0.9, GFR: 40-60, K: 3.4  - Ferritin: 229, TSAT: 22%, Folate: 7  - KF, LF, Ratio: 1.12; [] spep/radha    Imagin24: CTA: acute bleed at the level of the cecum - incidental findings: severe plaque burden at bilateral common iliac artery and bilateral common femoral artery     24: CT chest w/o contrast: COPD, scattered pulmonary nodules up to 7 mm in BJORN (exam limited by motion)    10/31/2023: CT chest- small new cluster of RLL micronodules, likely infectious/inflammatory     2023: CTA chest - evaluation limited due to motion artifact, patchy nonspecific GGOs involving lingula, RML and right lung base, may reflect infectious/inflammatory change     Endoscopies:   5/15/24: EGD: 2 cm hiatal hernia, otherwise unremarkable     24: Colonoscopy: poor prep   - clotted blood in entire colon; unable to visualize colon    Assessment   Assessment and Plan:   Jessica Floyd is a 74 y.o. female with nonobstructive CAD, HTN/CKD3, COPD/PH, SAH (/ to ruptured aneurysm in ), Right carotid artery stent (iatrogenic rupture during cerebral angiography, on aspirin), HFpEF (lasix), incarcerated inguinal hernia s/p necrotic small bowel resection (24). She is followed by Hematology for a 2  year history of macrocytic anemia, first identified in 10/2022 with prior evidence of iron deficiency and B12 deficiency; and leukocytosis. She presents today for follow up.     2025 Labs:   - WBC: 16 (22), ANC: 10,000, monocytes; 3000, eosinphils: 700, basophils; 280, Hgb: 12 (9.8 in 2025), MCV: 105, platelets: 386  - CMP: Cr: 0.9, GFR: 40-60, K: 3.4  - Ferritin: 229, TSAT: 22% (4%), Folate: 7, MMA pending  - KF, LF, Ratio: 1.12     # Leukocytosis  # Neutrophilia  # Monocytosis  - WBC intermittently elevated since , predominantly neutrophilia; smear 10/2024 with leukocytosis w/ left shift, no blasts; suggestive of an inflammatory process  - Given persistent monocytosis, plan to repeat labs including smear and flow cytometry in 3 months. Return precautions reviewed.     # Macrocytic Anemia, improving  # Vitamin B12 Deficiency    # Iron Deficiency    - Causes of Mrs. Powers anemia is likely multifactorial with a component of vitamin b12 deficiency (MMA: 1.72, MCV >100; anemia improving after starting IM B12 in May 2024), prior iron deficiency (TSAT: 4%, ferritin previously <40, last received IV iron in 2024), anemia of chronic disease/inflammation (her anemia nadirs during COVID, influenza infections, hospitalizations etc), anemia of renal disease (GFR at one point of 43), and possible early MDS vs. other bone marrow process based on age/elevated MCV/leukocytosis above.   - To continue to follow with GI to discuss repeating colonoscopy (2024 non- diagnostic) for prior iron deficiency    # Unintentional Weight Loss  - Unclear cause but started after small bowel resection last year; CT chest in 10/2024 and CT A/P in 2024 without overt malignancy  - Discussed considering repeating a CT CAP now. After discussion, pt would like to wait and reevaluate in 3 months  - Encouraged her to follow up with PCP and GI for further assessment    # Other Co-Morbidities  - B12 deficiency: IF abs  negative, MMA: 0.46 from 1.72 after starting IM B12 in May 2024 (1.72 in 3/2024 prior to small bowel resection), continue IM B12, per PCP   - Elevated FLCs: normal ratio, no monoclonal protein on spep/miroslava suggestive of inflammation, GFR upper 50s on occasional (which would make FLC in normal range for her level of renal function), CTM  - Incarcerated inguinal hernia s/p necrotic small bowel resection (24): encouraged GI and surgery follow up   - COPD/Pulmonary nodules: noted on CT chest on 24 and 10/2024 (few stable 6 mm pulmonary nodules), given smoking hx, consider repeat CT chest in 6 months, per PCP and pulmonary  - PAD: severe plaque burden at bilateral common iliac artery and bilateral common femoral artery noted on CTA in May 2024, per PCP  - Epistaxis (2023, presumably from dry nasal mucosa, requiring rhino rocket): per ENT  - Nonobstructive CAD, HTN (HCTZ)/CKD3, COPD/PH on TTE, SAH ( to ruptured aneurysm in ), Right Carotid artery stent for complication (iatrogenic rupture during cerebral angiography), HFpEF (lasix): stable on current regimen, per PCP  - Cancer Screening: Colonoscopy:  (reportedly wnl), Pap:  (Pap: wnl, HPV: neg), Lung cancer screening: last 10/2024, MM2022: wnl - per PCP    To do:  - RTC in 3 months with labs prior - CBC, CMP, Peripheral Smear, flow cytometry, ESR/CRP, Ferritin, Iron studies, Soluble Transferrin Receptor, Vit B12, MMA, folate   - Mammogram due now (ordered)  - PCP (IM B12, pulmonary nodules, weight loss), GI (scopes), Pulmonology (micronodules)    Med Onc Chart Routing      Follow up with physician . RTC in May 2025 with labs ordered today at least a week prior to appointment - at [facility] location   Follow up with [facility]    Infusion scheduling note    Injection scheduling note    Labs    Imaging   Mammogram first available   Pharmacy appointment    Other referrals              MDM includes:    - Acute or chronic illness or injury that poses a  threat to life or bodily function  - Review of prior external notes from unique source  - Independent review and explanation of 3+ results from unique tests   - Discussion of management and ordering 3+ unique tests   - Extensive discussion of treatment and management  - Prescription drug management  - Drug therapy requiring intensive monitoring for toxicity    - Overall, I discussed the diagnosis, history, stage, labs/imaging, prognosis, management, and treatment plan as applicable. I reviewed adverse short and long term effects as applicable.   - Informed patient if symptoms are getting worse that it is their responsibility to call the clinic and schedule follow up sooner than stated follow up. Also informed patient if they do not hear from the appointment center in 2-5 business days for their referrals, the patient must call the Oncology clinic so we can follow up on procedures or referral scheduling. Patient was fully informed of current medical plan, all questions were answered and patient verbalized understanding. No further questions.   - Face to Face Visit time I spent with the patient: 60 minutes of total time spent on the encounter, including counseling patient and/or coordinating care, which includes face to face time and non-face to face time preparing to see the patient (eg, review of tests), Obtaining and/or reviewing separately obtained history, Documenting clinical information in the electronic or other health record, Independently interpreting results (not separately reported) and communicating results to the patient/family/caregiver, or Care coordination (not separately reported).     Signed   Agata Rolon MD  [facility]

## 2025-02-04 NOTE — Clinical Note
Kishan Valencia, Saw Mrs. Floyd today in heme clinic for macrocytic anemia improving with the b12 injections. She has intermittent stable leukocytosis predominantly neutrophilia suggesting more of an inflammatory process. But she does also have monocytosis since 2023 so will continue to follow her and obtain flow cytometry on her next labs.  She has been have about 1.5 yr hx of unintentional weight loss, now weights about 106 from her usual of 130. I was hoping to plug her back in with you to discuss  Thanks!

## 2025-02-04 NOTE — TELEPHONE ENCOUNTER
Refill Decision Note   Jessica Floyd  is requesting a refill authorization.  Brief Assessment and Rationale for Refill:  Quick Discontinue     Medication Therapy Plan:  The original prescription was discontinued on 9/10/2024 by Michael Lal MD.      Comments:     Note composed:2:41 AM 02/04/2025

## 2025-02-05 LAB
PATHOLOGIST INTERPRETATION IFE: NORMAL
PATHOLOGIST INTERPRETATION SPE: NORMAL
STFR SERPL-MCNC: 4.4 MG/L (ref 1.8–4.6)

## 2025-02-07 LAB — METHYLMALONATE SERPL-SCNC: 0.34 UMOL/L

## 2025-02-14 DIAGNOSIS — E78.5 HYPERLIPIDEMIA, UNSPECIFIED: ICD-10-CM

## 2025-02-14 NOTE — TELEPHONE ENCOUNTER
Refill Routing Note   Medication(s) are not appropriate for processing by [facility] for the following reason(s):        Required labs outdated  No active prescription written by provider    [facility] action(s):  Defer               Appointments  past 12m or future 3m with PCP    Date Provider   Last Visit   10/23/2024 Jace Valencia MD   Next Visit   4/24/2025 Jace Valencia MD   ED visits in past 90 days: 0        Note composed:2:16 AM 02/14/2025

## 2025-02-14 NOTE — TELEPHONE ENCOUNTER
No care due was identified.  [facility] Embedded Care Due Messages. Reference number: [id number].   2/14/2025 12:37:27 AM CST

## 2025-02-19 RX ORDER — ATORVASTATIN CALCIUM 80 MG/1
80 TABLET, FILM COATED ORAL
Qty: 90 TABLET | Refills: 3 | Status: SHIPPED | OUTPATIENT
Start: 2025-02-19

## 2025-02-20 ENCOUNTER — TELEPHONE (OUTPATIENT)
Dept: TRANSPLANT | Facility: CLINIC | Age: 75
End: 2025-02-20
Payer: MEDICARE

## 2025-02-20 NOTE — TELEPHONE ENCOUNTER
Returned call to patient, who stated she will not have transportation to come for her appointments tomorrow. Offered to reschedule her appointments to next week. She asked for a date on which she and her daughter can see Dr. Roberts in clinic. Offered to re-book her appointments to Friday, 2/28/25, as Dr. Roberts will be in clinic that day. Mrs. Floyd accepted the offered morning appointments on 2/28. Patient stated she will view the updated appointment details in her [facility] portal. All questions and needs were addressed to the patient's satisfaction at this time.       ----- Message from Tech Jasimine sent at 2/20/2025  3:52 PM CST -----  Regarding: Reschedule 2/21 appts  Contact: [phone]  Type:  Reschedule apptWho Called: the pt is calling to reschedule all appts on 2/21.Would the patient rather a call back or a response via [facility]? CallBest Call Back Number: [phone]

## 2025-02-25 ENCOUNTER — PATIENT MESSAGE (OUTPATIENT)
Dept: INTERNAL MEDICINE | Facility: CLINIC | Age: 75
End: 2025-02-25
Payer: MEDICARE

## 2025-02-25 NOTE — TELEPHONE ENCOUNTER
Pts has concerns about current measles outbreak and is wondering if she needs the measles vaccine. Pt stated she had measles when she was 5 or 6.

## 2025-02-27 ENCOUNTER — TELEPHONE (OUTPATIENT)
Dept: TRANSPLANT | Facility: CLINIC | Age: 75
End: 2025-02-27
Payer: MEDICARE

## 2025-02-28 ENCOUNTER — HOSPITAL ENCOUNTER (OUTPATIENT)
Dept: PULMONOLOGY | Facility: CLINIC | Age: 75
Discharge: HOME | End: 2025-02-28
Payer: MEDICARE

## 2025-02-28 ENCOUNTER — OFFICE VISIT (OUTPATIENT)
Dept: TRANSPLANT | Facility: CLINIC | Age: 75
End: 2025-02-28
Payer: MEDICARE

## 2025-02-28 VITALS — BODY MASS INDEX: 19.49 KG/M2 | HEIGHT: 63 IN | WEIGHT: 110 LBS

## 2025-02-28 VITALS
BODY MASS INDEX: 19.49 KG/M2 | OXYGEN SATURATION: 91 % | RESPIRATION RATE: 16 BRPM | SYSTOLIC BLOOD PRESSURE: 126 MMHG | HEIGHT: 63 IN | HEART RATE: 81 BPM | WEIGHT: 110 LBS | TEMPERATURE: 98 F | DIASTOLIC BLOOD PRESSURE: 67 MMHG

## 2025-02-28 DIAGNOSIS — Z01.84 IMMUNITY STATUS TESTING: Primary | ICD-10-CM

## 2025-02-28 DIAGNOSIS — J43.9 PULMONARY EMPHYSEMA, UNSPECIFIED EMPHYSEMA TYPE: ICD-10-CM

## 2025-02-28 DIAGNOSIS — F41.9 SEVERE ANXIETY: ICD-10-CM

## 2025-02-28 DIAGNOSIS — J43.9 PULMONARY EMPHYSEMA, UNSPECIFIED EMPHYSEMA TYPE: Primary | ICD-10-CM

## 2025-02-28 DIAGNOSIS — J30.9 ALLERGIC RHINITIS, UNSPECIFIED SEASONALITY, UNSPECIFIED TRIGGER: ICD-10-CM

## 2025-02-28 DIAGNOSIS — J96.11 CHRONIC HYPOXEMIC RESPIRATORY FAILURE: ICD-10-CM

## 2025-02-28 DIAGNOSIS — R63.4 WEIGHT LOSS: ICD-10-CM

## 2025-02-28 PROCEDURE — 1101F PT FALLS ASSESS-DOCD LE1/YR: CPT | Mod: CPTII,NTX,S$GLB, | Performed by: INTERNAL MEDICINE

## 2025-02-28 PROCEDURE — 1159F MED LIST DOCD IN RCRD: CPT | Mod: CPTII,NTX,S$GLB, | Performed by: INTERNAL MEDICINE

## 2025-02-28 PROCEDURE — 3074F SYST BP LT 130 MM HG: CPT | Mod: CPTII,NTX,S$GLB, | Performed by: INTERNAL MEDICINE

## 2025-02-28 PROCEDURE — 99999 PR PBB SHADOW E&M-EST. PATIENT-LVL IV: CPT | Mod: PBBFAC,TXP,, | Performed by: INTERNAL MEDICINE

## 2025-02-28 PROCEDURE — 3078F DIAST BP <80 MM HG: CPT | Mod: CPTII,NTX,S$GLB, | Performed by: INTERNAL MEDICINE

## 2025-02-28 PROCEDURE — 3288F FALL RISK ASSESSMENT DOCD: CPT | Mod: CPTII,NTX,S$GLB, | Performed by: INTERNAL MEDICINE

## 2025-02-28 PROCEDURE — 1160F RVW MEDS BY RX/DR IN RCRD: CPT | Mod: CPTII,NTX,S$GLB, | Performed by: INTERNAL MEDICINE

## 2025-02-28 PROCEDURE — 99214 OFFICE O/P EST MOD 30 MIN: CPT | Mod: 25,NTX,S$GLB, | Performed by: INTERNAL MEDICINE

## 2025-02-28 PROCEDURE — 1125F AMNT PAIN NOTED PAIN PRSNT: CPT | Mod: CPTII,NTX,S$GLB, | Performed by: INTERNAL MEDICINE

## 2025-02-28 NOTE — PROGRESS NOTES
ADVANCED LUNG DISEASE FOLLOW-UP                                                                                                                                             Reason for Visit:  Dyspnea; COPD    Referring Physician: Kimberly Pascual P*    History of Present Illness: Jessica Floyd is a 74 y.o. female who is on 0 of oxygen.  She is on no assisted ventilation.  Her New York Heart Association Class is II and a Karnofsky score of 90% - Able to carry on normal activity: minor symptoms of disease. She is not diabetic.    Here today for routine follow-up.  Since her last visit, she has been doing fair.  Currently she feels poor due to allergies and post nasal gtt.  She takes antihistamines and flonase.  No reflux.  Takes Trelegy.  No prn albuterol.  Remains as active as she can be.  Her biggest complaint continues to be around her weight loss.  Her weight is currently stable but states over the last year, she has lost a significant amount of weight.  Follows with GI and hematology.  Continues to drink protein shakes.  Anxiety somewhat controlled.          Previous Visti  Pt presents for follow-up of her COPD.  She has COPD with PH on TTE.  She was hospitalized in December for acute on chronic hypoxic RF 2/2 influenza.  She recovered and now feels back to her baseline.  She takes her inhalers as directed.  Has an albuterol prn inhaler but does not need to use it.  Remains active.  Does not have hypercapnea or require NIPPV.  She would like testing to see how much O2 she needs as she is planning a trip and does not have a portable concentrator to travel with.  She also endorses panic attacks and anxiety/PTSD over medical traumas in the past.  Has been trying to establish with psych without any luck.  Overall, feels at her baseline.         Past Medical History:   Diagnosis Date    Allergic rhinitis     Amblyopia     Carotid stenosis     Coronary atherosclerosis     Outside [facility] 6/2014: 20% mLAD, 50% mCx, 20%,  mRCA    COVID-19 11/15/2023    Dyslipidemia     ESBL (extended spectrum beta-lactamase) producing bacteria infection     UTI    Hypertension     Influenza A 12/28/2023    Nausea and vomiting 05/26/2017    Pulmonary emphysema 10/28/2023    SAH (subarachnoid hemorrhage) 08/24/2016    1999, s/p clipping    Subarachnoid hemorrhage due to ruptured aneurysm 1999       Past Surgical History:   Procedure Laterality Date    ADENOIDECTOMY      ANTERIOR CRUCIATE LIGAMENT REPAIR  2012    APPENDECTOMY      CATARACT EXTRACTION W/  INTRAOCULAR LENS IMPLANT Right 11/07/2022    Procedure: EXTRACTION, CATARACT, WITH IOL INSERTION;  Surgeon: Higinio Cristina MD;  Location: [facility];  Service: Ophthalmology;  Laterality: Right;    CATARACT EXTRACTION W/  INTRAOCULAR LENS IMPLANT Left 11/21/2022    Procedure: EXTRACTION, CATARACT, WITH IOL INSERTION;  Surgeon: Higinio Cristina MD;  Location: [facility];  Service: Ophthalmology;  Laterality: Left;    CEREBRAL ANEURYSM REPAIR  1999    clip    COLONOSCOPY N/A 5/16/2024    Procedure: COLONOSCOPY;  Surgeon: Rich Syed MD;  Location: [facility] (2ND FLR);  Service: Endoscopy;  Laterality: N/A;    DENTAL SURGERY      DIAGNOSTIC LAPAROSCOPY N/A 5/17/2024    Procedure: LAPAROSCOPY, DIAGNOSTIC;  Surgeon: Ruben Oscar MD;  Location: [facility] OR 2ND FLR;  Service: General;  Laterality: N/A;    ESOPHAGOGASTRODUODENOSCOPY N/A 5/15/2024    Procedure: EGD (ESOPHAGOGASTRODUODENOSCOPY);  Surgeon: Rich Syed MD;  Location: [facility] (2ND FLR);  Service: Endoscopy;  Laterality: N/A;    EXCISION, SMALL INTESTINE N/A 5/17/2024    Procedure: EXCISION, SMALL INTESTINE;  Surgeon: Ruben Oscar MD;  [facility];  Service: General;  Laterality: N/A;    EYE SURGERY Bilateral     cataract removal and lens implants    HIP ARTHROPLASTY Left 05/28/2023    Procedure: TOTAL HIP ARTHROPLASTY;  Surgeon: Juan Wan MD;  Location: [facility] OR 2ND FLR;  Service: Orthopedics;   Laterality: Left;    LAPAROTOMY, EXPLORATORY N/A 5/17/2024    Procedure: LAPAROTOMY, EXPLORATORY;  Surgeon: Ruben Oscar MD;  Location: [facility] OR 2ND FLR;  Service: General;  Laterality: N/A;    REPAIR, HERNIA, INGUINAL Bilateral 5/17/2024    Procedure: REPAIR, HERNIA, INGUINAL;  Surgeon: Ruben Oscar MD;  Location: [facility] OR 2ND FLR;  Service: General;  Laterality: Bilateral;    TONSILLECTOMY         Allergies: Hydromorphone    Current Outpatient Medications   Medication Sig    allopurinoL (ZYLOPRIM) 100 MG tablet Take 2 tablets (200 mg total) by mouth once daily.    amLODIPine (NORVASC) 5 MG tablet Take 1 tablet (5 mg total) by mouth once daily.    atorvastatin (LIPITOR) 80 MG tablet TAKE 1 TABLET BY MOUTH EVERY DAY    carvediloL (COREG) 6.25 MG tablet Take 1 tablet (6.25 mg total) by mouth 2 (two) times daily.    celecoxib (CELEBREX) 200 MG capsule TAKE 1 CAPSULE BY MOUTH DAILY AS NEEDED FOR PAIN.    colchicine (COLCRYS) 0.6 mg tablet TAKE 1 TABLET (0.6 MG TOTAL) BY MOUTH ONCE DAILY. AS NEEDED FOR GOUT    cyanocobalamin 1,000 mcg/mL injection Inject 1,000 mcg into the muscle every 7 days.    fluticasone propionate (FLONASE) 50 mcg/actuation nasal spray 2 sprays (100 mcg total) by Each Nostril route once daily.    fluticasone-umeclidin-vilanter (TRELEGY ELLIPTA) 200-62.5-25 mcg inhaler Inhale 1 puff into the lungs once daily.    hydroCHLOROthiazide (HYDRODIURIL) 25 MG tablet Take 1 tablet (25 mg total) by mouth once daily.    LIDOcaine 4 % Gel Apply 1 g topically 2 (two) times daily as needed (foot pain).    LIDOcaine-prilocaine (EMLA) cream Apply topically as needed.    pantoprazole (PROTONIX) 40 MG tablet Take 1 tablet (40 mg total) by mouth 2 (two) times daily.    QUEtiapine (SEROQUEL) 50 MG tablet Take 1 tablet (50 mg total) by mouth 2 (two) times daily.    syringe, disposable, 1 mL Syrg 1 Stick by Misc.(Non-Drug; Combo Route) route once a week.     No current facility-administered medications  for this visit.     Facility-Administered Medications Ordered in Other Visits   Medication    mupirocin 2 % ointment    tranexamic acid (CYKLOKAPRON) 1,000 mg in sodium chloride 0.9 % 100 mL IVPB (MB+)    tranexamic acid (CYKLOKAPRON) 1,000 mg in sodium chloride 0.9 % 100 mL IVPB (MB+)       Immunization History   Administered Date(s) Administered    COVID-19, MRNA, LN-S, PF (Pfizer) (Gray Cap) 04/20/2022    COVID-19, MRNA, LN-S, PF (Pfizer) (Purple Cap) 01/09/2021, 01/30/2021, 08/23/2021    COVID-19, mRNA, LNP-S, PF, astrid-sucrose, 30 mcg/0.3 mL (Pfizer Ages 12+) 09/30/2023, 09/16/2024    COVID-19, mRNA, LNP-S, bivalent booster, PF (Moderna Omicron)12 + YEARS 09/30/2022    Influenza (FLUAD) - Quadrivalent - Adjuvanted - PF *Preferred* (65+) 09/30/2023    Influenza - Quadrivalent - High Dose - PF (65 years and older) 10/02/2020, 10/08/2021, 09/24/2022    Influenza - Trivalent - Fluzone High Dose - PF (65 years and older) 10/03/2016, 11/21/2017, 10/16/2018, 11/12/2019    Pneumococcal Conjugate - 13 Valent 10/03/2016    Pneumococcal Polysaccharide - 23 Valent 11/21/2017    RSVpreF (Arexvy) 12/01/2023    Tdap 08/04/2017    Zoster Recombinant 10/08/2021, 05/23/2022     Family History:    Family History   Problem Relation Name Age of Onset    Hypertension Mother      Aneurysm Mother      Hypertension Father      Alcohol abuse Father      Kidney disease Brother      Heart disease Brother      Gout Brother      No Known Problems Daughter Hannah     No Known Problems Daughter Aby     No Known Problems Daughter Diya      Social History     Substance and Sexual Activity   Alcohol Use Yes    Alcohol/week: 14.0 standard drinks of alcohol    Types: 14 Glasses of wine per week    Comment: 1 or 2 glasses of red wine      Social History     Substance and Sexual Activity   Drug Use No      Social History     Socioeconomic History    Marital status:    Occupational History    Occupation: Retired   Tobacco Use    Smoking  status: Former     Current packs/day: 0.00     Average packs/day: 0.5 packs/day for 20.0 years (10.0 ttl pk-yrs)     Types: Cigarettes     Start date: 1979     Quit date: 1999     Years since quittin.1     Passive exposure: Past    Smokeless tobacco: Never   Substance and Sexual Activity    Alcohol use: Yes     Alcohol/week: 14.0 standard drinks of alcohol     Types: 14 Glasses of wine per week     Comment: 1 or 2 glasses of red wine    Drug use: No    Sexual activity: Yes     Partners: Male   Social History Narrative    .  Has 3 grown daughters.       Social Drivers of Health     Financial Resource Strain: Low Risk  (2025)    Overall Financial Resource Strain (CARDIA)     Difficulty of Paying Living Expenses: Not hard at all   Food Insecurity: No Food Insecurity (2025)    Hunger Vital Sign     Worried About Running Out of Food in the Last Year: Never true     Ran Out of Food in the Last Year: Never true   Transportation Needs: No Transportation Needs (10/8/2024)    PRAPARE - Transportation     Lack of Transportation (Medical): No     Lack of Transportation (Non-Medical): No   Physical Activity: Insufficiently Active (2025)    Exercise Vital Sign     Days of Exercise per Week: 2 days     Minutes of Exercise per Session: 30 min   Stress: No Stress Concern Present (2025)    [employer] [place] [employer] - Occupational Stress Questionnaire     Feeling of Stress : Only a little   Housing Stability: Unknown (10/8/2024)    Housing Stability Vital Sign     Unable to Pay for Housing in the Last Year: No     Homeless in the Last Year: No     Review of Systems   Constitutional:  Negative for chills, diaphoresis, fever, malaise/fatigue and weight loss.   HENT:  Negative for congestion, ear discharge, ear pain, hearing loss, nosebleeds, sinus pain, sore throat and tinnitus.    Eyes:  Negative for blurred vision, double vision, photophobia, pain, discharge and redness.  "  Respiratory:  Positive for shortness of breath. Negative for cough, hemoptysis, sputum production, wheezing and stridor.    Cardiovascular:  Negative for chest pain, palpitations, orthopnea, claudication, leg swelling and PND.   Gastrointestinal:  Negative for abdominal pain, blood in stool, constipation, diarrhea, heartburn, melena, nausea and vomiting.   Genitourinary:  Negative for dysuria, flank pain, frequency, hematuria and urgency.   Musculoskeletal:  Negative for back pain, falls, joint pain, myalgias and neck pain.   Skin:  Negative for itching and rash.   Neurological:  Negative for dizziness, tingling, tremors, sensory change, speech change, focal weakness, seizures, loss of consciousness, weakness and headaches.   Endo/Heme/Allergies:  Negative for environmental allergies and polydipsia. Does not bruise/bleed easily.   Psychiatric/Behavioral:  Negative for depression, hallucinations, memory loss, substance abuse and suicidal ideas. The patient is not nervous/anxious and does not have insomnia.      Vitals  /67 (BP Location: Right arm, Patient Position: Sitting)   Pulse 81   Temp 97.9 °F (36.6 °C) (Oral)   Resp 16   Ht 5' 3" (1.6 m)   Wt 49.9 kg (110 lb)   SpO2 (!) 91%   BMI 19.49 kg/m²   Physical Exam  Vitals and nursing note reviewed.   Constitutional:       General: She is not in acute distress.     Appearance: She is well-developed. She is not diaphoretic.   HENT:      Head: Normocephalic and atraumatic.      Nose: Nose normal.      Mouth/Throat:      Pharynx: No oropharyngeal exudate.   Eyes:      General: No scleral icterus.     Conjunctiva/sclera: Conjunctivae normal.      Pupils: Pupils are equal, round, and reactive to light.   Neck:      Thyroid: No thyromegaly.      Vascular: No JVD.      Trachea: No tracheal deviation.   Cardiovascular:      Rate and Rhythm: Normal rate and regular rhythm.      Heart sounds: Normal heart sounds. No murmur heard.     No friction rub. No gallop. "   Pulmonary:      Effort: Pulmonary effort is normal. No respiratory distress.      Breath sounds: Decreased air movement (bilateral) present. No wheezing or rales.   Abdominal:      General: Bowel sounds are normal. There is no distension.      Palpations: Abdomen is soft.      Tenderness: There is no abdominal tenderness.   Musculoskeletal:         General: No deformity. Normal range of motion.      Cervical back: Normal range of motion and neck supple.   Skin:     General: Skin is warm and dry.      Capillary Refill: Capillary refill takes less than 2 seconds.      Coloration: Skin is not pale.      Findings: No erythema or rash.   Neurological:      Mental Status: She is alert and oriented to person, place, and time.      Cranial Nerves: No cranial nerve deficit.   Psychiatric:         Judgment: Judgment normal.         Labs:  Hospital Outpatient Visit on 02/28/2025   Component Date Value    Physician Comment 03/01/2025                      Value:Spirometry shows obstruction with severe ventilatory impairment. This impairment may be due to the obstruction alone but restrictive disease is not ruled out. DLCO is severely decreased.   Â   Notes:  Â   Consider lung volume determination to help explain the etiology for the reduced FVC (restriction and/or air trapping).       Pre FVC 03/01/2025 1.64 (L)     PRE FEV5 03/01/2025 0.58 (L)     Pre FEV1 03/01/2025 0.80 (L)     Pre FEV1 FVC 03/01/2025 49.00 (L)     Pre FEF 25 75 03/01/2025 0.30 (L)     Pre PEF 03/01/2025 2.93 (L)     Pre  03/01/2025 6.93     Pre DLCO 03/01/2025 3.18 (L)     DLCO ADJ PRE 03/01/2025 3.33 (L)     DLCOVA PRE 03/01/2025 1.37 (L)     DLVAAdj PRE 03/01/2025 1.44 (L)     VA PRE 03/01/2025 2.32 (L)     IVC PRE 03/01/2025 1.55 (L)     FVC Ref 03/01/2025 2.58     FVC LLN 03/01/2025 1.84     FVC Pre Ref 03/01/2025 63.6     FEV05 REF 03/01/2025 1.58     FEV05 LLN 03/01/2025 0.73     PRE FEV05 REF 03/01/2025 36.5     FEV1 Ref 03/01/2025 1.98      FEV1 LLN 03/01/2025 1.41     FEV1 Pre Ref 03/01/2025 40.5     FEV1 FVC Ref 03/01/2025 78     FEV1 FVC LLN 03/01/2025 64     FEV1 FVC Pre Ref 03/01/2025 63.1     FEF 25 75 Ref 03/01/2025 2.37     FEF 25 75 LLN 03/01/2025 0.97     FEF 25 75 Pre Ref 03/01/2025 12.7     PEF Ref 03/01/2025 5.09     PEF LLN 03/01/2025 3.43     PEF Pre Ref 03/01/2025 57.5     DLCO Single Breath Ref 03/01/2025 19.93     DLCO Single Breath LLN 03/01/2025 14.20     DLCO SINGLEBREATH ULN 03/01/2025 25.66     DLCO Single Breath Pre R* 03/01/2025 16.0     DLCOc Single Breath Ref 03/01/2025 19.93     DLCOc Single Breath LLN 03/01/2025 14.20     DLCOC SINGLEBREATH ULN 03/01/2025 25.66     DLCOc Single Breath Pre * 03/01/2025 16.7     DLCOVA Ref 03/01/2025 4.18     DLCOVA LLN 03/01/2025 2.69     DLCOVA ULN 03/01/2025 5.66     DLCOVA Pre Ref 03/01/2025 32.8     DLCOc SBVA Ref 03/01/2025 4.18     DLCOc SBVA LLN 03/01/2025 2.69     DLCOCSBVA ULN 03/01/2025 5.66     DLCOc SBVA Pre Ref 03/01/2025 34.4     VA Single Breath Ref 03/01/2025 4.62     VA Single Breath LLN 03/01/2025 4.62     VA SINGLEBREATH ULN 03/01/2025 4.62     VA Single Breath Pre Ref 03/01/2025 50.2     IVC Single Breath Ref 03/01/2025 2.58     IVC Single Breath LLN 03/01/2025 1.84     IVC SINGLEBREATH ULN 03/01/2025 3.36     IVC Single Breath Pre Ref 03/01/2025 60.2     FVCZSCORE 03/01/2025 -2.11     EUJ4QMCPTW 03/01/2025 -3.29     DSV5ZMCMABEHG 03/01/2025 -3.05     DLCOSINGLEBREATHZSCORE 03/01/2025 -4.81     DLCOcSingleBreathZSCORE 03/01/2025 -4.76            2/28/2025    11:58 AM 12/1/2023     8:58 AM   Pulmonary Function Tests   FVC 1.64 liters 1.95 liters   FEV1 0.8 liters 1.12 liters   TLC (liters)  5.13 liters   DLCO (ml/mmHg sec) 3.18 ml/mmHg sec 5.92 ml/mmHg sec   FVC% 63 74   FEV1% 40 55   FEF 25-75 0.97 0.54   FEF 25-75% 12 31   TLC%  107   RV  3.18   RV%  152   DLCO% 16 29         2/28/2025    10:32 AM 8/27/2024    11:38 AM 1/31/2024    11:39 AM 12/1/2023     9:05 AM   6MW    6MWT Status completed with stops completed without stopping completed with stops completed with stops   Patient Reported Dyspnea No complaints Dyspnea;Leg pain  Dyspnea    Was O2 used? Yes No No No   Delivery Method Cannula;Pull Tank;Continuous Flow      6MW Distance walked (feet) 600 feet 677 feet 650 feet 650 feet   Distance walked (meters) 182.88 meters 206.35 meters 198.12 meters 198.12 meters   Did patient stop? Yes No Yes Yes   Oxygen Saturation 90 % 91 % 92 % 96 %   Supplemental Oxygen Room Air Room Air Room Air  Room Air    Heart Rate 80 bpm 84 bpm 92 bpm 87 bpm   Blood Pressure 96/62 81/54 132/81 138/72   Jessica Dyspnea Rating  moderate nothing at all nothing at all nothing at all   Oxygen Saturation 93 % 95 % 89 % 94 %   Supplemental Oxygen 2 L/M Room Air Room Air  Room Air    Heart Rate 101 bpm 94 bpm 101 bpm 92 bpm   Blood Pressure 140/74 113/68 149/71 136/81   Jessica Dyspnea Rating  moderate nothing at all moderate moderate   Recovery Time (seconds) 215 seconds 182 seconds 72 seconds 101 seconds   Oxygen Saturation 94 % 93 % 91 % 97 %   Supplemental Oxygen 2 L/M Room Air Room Air  Room Air    Heart Rate 91 bpm 86 bpm 93 bpm 79 bpm       Data saved with a previous flowsheet row definition       Imaging:  Results for orders placed in visit on 09/23/18    XR CHEST PA AND LATERAL    Narrative  EXAMINATION:  XR CHEST PA AND LATERAL    CLINICAL HISTORY:  Cough    TECHNIQUE:  PA and lateral views of the chest were performed.    COMPARISON:  CT 05/25/2017, radiograph 05/25/2017    FINDINGS:  The cardiomediastinal silhouette is not enlarged, noting calcification of the aortic arch..  There is no pleural effusion.  The trachea is midline.  The lungs are symmetrically expanded bilaterally with mildly coarse interstitial attenuation, and mild bilateral basilar subsegmental atelectasis..  No large focal consolidation seen.  There is no pneumothorax.  The osseous structures are remarkable for degenerative  changes..    IMPRESSION:    1. No acute cardiopulmonary process.      Electronically signed by: Armani Yuan MD  Date:    09/23/2018  Time:    12:50    Results for orders placed during the hospital encounter of 07/26/23        US Carotid Bilateral    Narrative  EXAMINATION:  US CAROTID BILATERAL    CLINICAL HISTORY:  check patency of known carotid stent given syncope;    TECHNIQUE:  Grayscale and color spectral Doppler ultrasound imaging of the carotid and vertebral artery systems bilaterally.    COMPARISON:  None    FINDINGS:  Patient with reported right common carotid artery stent.    Right:    Internal Carotid Artery (ICA) peak systolic velocity 22 cm/sec    Internal Carotid Artery (ICA) end-diastolic velocity 7 cm/sec    Common Carotid Artery (CCA) peak systolic velocity 30 cm/sec    Common Carotid Artery (CCA) end-diastolic velocity 8 cm/sec    ICA/CCA peak systolic velocity ratio: 0.7    ICA/CCA end-diastolic velocity ratio: 0.8    Plaque formation: Homogeneous    Vertebral artery: Antegrade flow and normal waveform.    Left:    Internal Carotid Artery (ICA)  peak systolic velocity 43 cm/sec    Internal Carotid Artery (ICA) end-diastolic velocity 15 cm/sec    Common Carotid Artery (CCA) peak systolic velocity 47 cm/sec    Common Carotid Artery (CCA) end-diastolic velocity 11 cm/sec    ICA/CCA peak systolic velocity ratio: 0.9    ICA/CCA end diastolic velocity ratio: 1.4    Plaque formation: Homogeneous    Vertebral artery: Antegrade flow and normal waveform.    Impression  1. Right common carotid artery stent patent.  2. 1 - 39% stenosis of the right internal carotid artery.  3. 1 - 39% stenosis of the left internal carotid artery.    Electronically signed by resident: Keo Jang  Date:    10/29/2023  Time:    08:45    Electronically signed by: Danis Guerrero Jr  Date:    10/29/2023  Time:    08:50    Results for orders placed during the hospital encounter of 10/28/23    CT Chest Without  "Contrast    Narrative  EXAMINATION:  CT CHEST WITHOUT CONTRAST    CLINICAL HISTORY:  "Dyspnea, chronic, unclear etiology;"    COMPARISON:  10/28/2023.    TECHNIQUE:  Volumetric data acquisition of the chest from the lung apices to the adrenals was obtained without intravenous contrast. Sagittal and coronal multiplanar reconstructions were performed. Lack of IV contrast material limits the assessment of mediastinal and abdominal structures.    FINDINGS:  The airways are patent.    The thyroid gland is homogeneous, normal in size.    No mediastinal, hilar or subcarinal adenopathy.    The thoracic aorta is of normal caliber and demonstrates moderate atherosclerotic plaque.    The cardiac silhouette appears within normal limits in size, no pericardial effusion.  Coronary artery calcifications seen.  Stent in the right common carotid artery.    The esophagus appears normal in course and caliber.    Three lobular and paraseptal emphysema.    New cluster of micro nodules within the right lower lobe, 302:362.  Small Bochdalek hernia.    The left lung is well aerated.    No pleural effusion.    The axillary regions appear normal.    The upper abdominal organs demonstrate a left hepatic lobe low attenuating lesion 2.3 cm.    The osseous structures straight mild loss of height at T11-T12 L1, unchanged.  No new fracture.  Moderate degenerative change of the right shoulder joint.    Impression  Small, new cluster of micro nodules, right lower lobe, most often seen in the setting of infection, follow-up recommended after treatment completed to ensure resolution.    Emphysema.    Left hepatic lobe hypoattenuating lesion suggestive of a cyst consider non emergent ultrasound evaluation.    See other details above.    This report was flagged in Epic as abnormal.      Electronically signed by: Joan Fernández MD  Date:    10/31/2023  Time:    15:05        Cardiodiagnostics:  Results for orders placed during the hospital encounter " "of 10/28/23    Echo    Interpretation Summary    Left Ventricle: The left ventricle is normal in size. Normal wall thickness. Normal wall motion. There is normal systolic function with a visually estimated ejection fraction of 60 - 65%. There is normal diastolic function.    Right Ventricle: Mild right ventricular enlargement. Wall thickness is normal. Right ventricle wall motion  is normal. Systolic function is normal.    Aortic Valve: The aortic valve is a unicuspid valve. There is mild aortic valve sclerosis. There is trace aortic regurgitation.    Mitral Valve: There is mild regurgitation.    Pulmonary Artery: There is mild pulmonary hypertension. The estimated pulmonary artery systolic pressure is 40 mmHg.    IVC/SVC: Intermediate venous pressure at 8 mmHg.        Assessment:  1. Pulmonary emphysema, unspecified emphysema type    2. Chronic hypoxemic respiratory failure    3. Allergic rhinitis, unspecified seasonality, unspecified trigger    4. Severe anxiety    5. Weight loss          Plan:   Followed for COPD.  On Trelegy.  Tolerating well.  Is at baseline following multiple hospitalizations for GI issues.  TTE with ePAP of 40mmHg likely related to hyperinflation from emphysema.  Qualified for oxygen today and discussed re-prescribing oxygen which she is agreeable to.    Requires 2L oxygen with exertion on walk test today.  TTE with ePAP of 40mmHg.      Continue with allergy regimen.       Has anxiety at baseline and feels like she has her own maneuvers to help with calming.  She was upset over using terms "advanced lung disease" today in relation to why it is difficult to gain weight.  Explained that this may be the first time she has heard the term but that she has had severe emphysema for as long as I have seen her.  Explained that with ongoing smoking cessation, her lung disease is likely to stay stable.    Continue with protein supplementation for weight loss.  Explained that given her multiple hospital " admissions and severe emphysema, she has reasons to have weight loss.  Her daughter is concerned about a malabsorption process and possible malignancy.  I will reach out to her GI and hematologist just to relay concerns.      Follow-up in 3 months with PFTs and 6MWT.          Hina Roberts D.O.  Pulmonary/Critical Care and Lung Transplantation  [facility]

## 2025-02-28 NOTE — LETTER
March 6, 2025                      [facility]  [address]  [facility] [address]  Phone: [phone]   Patient: Jessica Floyd   MR Number: [id number]   YOB: 1950   Date of Visit: 2/28/2025       Dear       Thank you for referring Jessica Floyd to me for evaluation. Attached you will find relevant portions of my assessment and plan of care.    If you have questions, please do not hesitate to call me. I look forward to following Jessica Floyd along with you.    Sincerely,    Hina Roberts, DO    Enclosure    If you would like to receive this communication electronically, please contact [email or web address] or [phone] to request [employer] access.    [employer] Link is a tool which provides read-only access to select patient information with whom you have a relationship. Its easy to use and provides real time access to review your patients record including encounter summaries, notes, results, and demographic information.    If you feel you have received this communication in error or would no longer like to receive these types of communications, please e-mail [email or web address]

## 2025-03-01 LAB
DLCO ADJ PRE: 3.33 ML/(MIN*MMHG) (ref 14.2–25.66)
DLCO SINGLE BREATH LLN: 14.2
DLCO SINGLE BREATH PRE REF: 16 %
DLCO SINGLE BREATH REF: 19.93
DLCOC SBVA LLN: 2.69
DLCOC SBVA PRE REF: 34.4 %
DLCOC SBVA REF: 4.18
DLCOC SINGLE BREATH LLN: 14.2
DLCOC SINGLE BREATH PRE REF: 16.7 %
DLCOC SINGLE BREATH REF: 19.93
DLCOCSBVAULN: 5.66
DLCOCSINGLEBREATHULN: 25.66
DLCOCSINGLEBREATHZSCORE: -4.76
DLCOSINGLEBREATHULN: 25.66
DLCOSINGLEBREATHZSCORE: -4.81
DLCOVA LLN: 2.69
DLCOVA PRE REF: 32.8 %
DLCOVA PRE: 1.37 ML/(MIN*MMHG*L) (ref 2.69–5.66)
DLCOVA REF: 4.18
DLCOVAULN: 5.66
DLVAADJ PRE: 1.44 ML/(MIN*MMHG*L) (ref 2.69–5.66)
FEF 25 75 LLN: 0.97
FEF 25 75 PRE REF: 12.7 %
FEF 25 75 REF: 2.37
FEV05 LLN: 0.73
FEV05 REF: 1.58
FEV1 FVC LLN: 64
FEV1 FVC PRE REF: 63.1 %
FEV1 FVC REF: 78
FEV1 LLN: 1.41
FEV1 PRE REF: 40.5 %
FEV1 REF: 1.98
FEV1FVCZSCORE: -3.05
FEV1ZSCORE: -3.29
FVC LLN: 1.84
FVC PRE REF: 63.6 %
FVC REF: 2.58
FVCZSCORE: -2.11
IVC PRE: 1.55 L (ref 1.84–3.36)
IVC SINGLE BREATH LLN: 1.84
IVC SINGLE BREATH PRE REF: 60.2 %
IVC SINGLE BREATH REF: 2.58
IVCSINGLEBREATHULN: 3.36
PEF LLN: 3.43
PEF PRE REF: 57.5 %
PEF REF: 5.09
PHYSICIAN COMMENT: ABNORMAL
PRE DLCO: 3.18 ML/(MIN*MMHG) (ref 14.2–25.66)
PRE FEF 25 75: 0.3 L/S (ref 0.97–3.77)
PRE FET 100: 6.93 SEC
PRE FEV05 REF: 36.5 %
PRE FEV1 FVC: 49 % (ref 63.55–89.76)
PRE FEV1: 0.8 L (ref 1.41–2.53)
PRE FEV5: 0.58 L (ref 0.73–2.44)
PRE FVC: 1.64 L (ref 1.84–3.36)
PRE PEF: 2.93 L/S (ref 3.43–6.75)
VA PRE: 2.32 L (ref 4.62–4.62)
VA SINGLE BREATH LLN: 4.62
VA SINGLE BREATH PRE REF: 50.2 %
VA SINGLE BREATH REF: 4.62
VASINGLEBREATHULN: 4.62

## 2025-03-03 ENCOUNTER — PATIENT MESSAGE (OUTPATIENT)
Dept: TRANSPLANT | Facility: CLINIC | Age: 75
End: 2025-03-03
Payer: MEDICARE

## 2025-03-03 NOTE — PROCEDURES
Jessica Floyd is a 74 y.o.   female patient, who presents for a 6 minute walk test ordered by KEYSHA Pascual.  The diagnosis is Shortness of Breath.  The patient's BMI is 19.5 kg/m2.  Predicted distance (lower limit of normal) is 319.07 meters.      Test Results:    The test was completed with stops.  The patient stopped 1 times for a total of 30 seconds.  The total time walked was 330 seconds.  During walking, the patient reported:  Dyspnea. The patient used a wheelchair, supplemental oxygen and supplemental oxygen during testing.     03/02/2025---------Distance: 182.88 meters (600 feet)    Lap Walk Time  sec O2 Sat % Supplemental Oxygen  lpm Heart Rate  bpm Blood Pressure  mmHg Jessica Scale   Pre-exercise  (Resting) 0  90 RA 80 96/62 3   During Exercise 1 97 87 2 95     During Exercise 2 193 93 2 99     End of Exercise 3 334 93 2 101 140/74 3   Post-exercise  (Recovery)   94 2 91 137/78        Recovery Time: 215 seconds    Performing nurse/tech: LEOBARDO Rhodes      PREVIOUS STUDY:   The patient had a previous study.    08/27/2024---------Distance: 206.35 meters (677 feet)       Lap Walk Time O2 Sat % Supplemental Oxygen Heart Rate Blood Pressure Jessica Scale Comment   Pre-exercise  (Resting) 0 0 91 % Room Air 84 bpm 81/54 mmHg 0     During Exercise 1 90 sec 90 % Room Air 79 bpm         During Exercise 2 208 sec 92 % Room Air 98 bpm         During Exercise 3 320 sec 90 % Room Air 95 bpm         End of Exercise   360 sec 95 % Room Air 94 bpm 113/68 mmHg 0     Post-exercise  (Recovery)     93 % Room Air  86 bpm 108/66 mmHg          CLINICAL INTERPRETATION:  Six minute walk distance is 182.88 meters (600 feet) with light dyspnea.  During exercise, there was significant desaturation while breathing supplemental oxygen.  Both blood pressure and heart rate remained stable with walking.  The patient did not report non-pulmonary symptoms during exercise.  The patient did complete the study, walking 330 seconds of the 360 second  test.  The patient may benefit from using supplemental oxygen.  Since the previous study in 8/2024, exercise capacity may be somewhat worse.  Based upon age and body mass index, exercise capacity is less than predicted.

## 2025-03-05 ENCOUNTER — SOCIAL WORK (OUTPATIENT)
Dept: TRANSPLANT | Facility: CLINIC | Age: 75
End: 2025-03-05
Payer: MEDICARE

## 2025-03-05 NOTE — PROGRESS NOTES
BRYAN routed internal oxygen orders to pt's current oxygen provider [facility] (ph: [phone], fx: [phone]). BRYAN in communication with VINITA Luciano, who states oxygen will be delivered no later than Friday. BRYAN is following and remains available.

## 2025-03-06 ENCOUNTER — PATIENT MESSAGE (OUTPATIENT)
Dept: TRANSPLANT | Facility: CLINIC | Age: 75
End: 2025-03-06
Payer: MEDICARE

## 2025-03-11 ENCOUNTER — HOSPITAL ENCOUNTER (OUTPATIENT)
Dept: RADIOLOGY | Facility: OTHER | Age: 75
Discharge: HOME | End: 2025-03-11
Attending: INTERNAL MEDICINE
Payer: MEDICARE

## 2025-03-11 DIAGNOSIS — Z12.31 ENCOUNTER FOR SCREENING MAMMOGRAM FOR MALIGNANT NEOPLASM OF BREAST: ICD-10-CM

## 2025-03-11 PROCEDURE — 77067 SCR MAMMO BI INCL CAD: CPT | Mod: TC,TXP

## 2025-03-11 PROCEDURE — 77063 BREAST TOMOSYNTHESIS BI: CPT | Mod: 26,NTX,, | Performed by: RADIOLOGY

## 2025-03-11 PROCEDURE — 77067 SCR MAMMO BI INCL CAD: CPT | Mod: 26,NTX,, | Performed by: RADIOLOGY

## 2025-03-13 ENCOUNTER — RESULTS FOLLOW-UP (OUTPATIENT)
Dept: HEMATOLOGY/ONCOLOGY | Facility: CLINIC | Age: 75
End: 2025-03-13

## 2025-03-17 ENCOUNTER — OFFICE VISIT (OUTPATIENT)
Dept: SPORTS MEDICINE | Facility: CLINIC | Age: 75
End: 2025-03-17
Payer: MEDICARE

## 2025-03-17 VITALS — BODY MASS INDEX: 19.49 KG/M2 | HEIGHT: 63 IN | WEIGHT: 110 LBS

## 2025-03-17 DIAGNOSIS — S46.011A TRAUMATIC COMPLETE TEAR OF RIGHT ROTATOR CUFF, INITIAL ENCOUNTER: Primary | ICD-10-CM

## 2025-03-17 DIAGNOSIS — M19.011 LOCALIZED PRIMARY OSTEOARTHRITIS OF RIGHT SHOULDER REGION: ICD-10-CM

## 2025-03-17 PROCEDURE — 1159F MED LIST DOCD IN RCRD: CPT | Mod: CPTII,S$GLB,TXP, | Performed by: PHYSICIAN ASSISTANT

## 2025-03-17 PROCEDURE — 1160F RVW MEDS BY RX/DR IN RCRD: CPT | Mod: CPTII,S$GLB,TXP, | Performed by: PHYSICIAN ASSISTANT

## 2025-03-17 PROCEDURE — 99214 OFFICE O/P EST MOD 30 MIN: CPT | Mod: 25,S$GLB,TXP, | Performed by: PHYSICIAN ASSISTANT

## 2025-03-17 PROCEDURE — 1125F AMNT PAIN NOTED PAIN PRSNT: CPT | Mod: CPTII,S$GLB,TXP, | Performed by: PHYSICIAN ASSISTANT

## 2025-03-17 PROCEDURE — 99999 PR PBB SHADOW E&M-EST. PATIENT-LVL III: CPT | Mod: PBBFAC,TXP,, | Performed by: PHYSICIAN ASSISTANT

## 2025-03-17 PROCEDURE — 1101F PT FALLS ASSESS-DOCD LE1/YR: CPT | Mod: CPTII,S$GLB,TXP, | Performed by: PHYSICIAN ASSISTANT

## 2025-03-17 PROCEDURE — 3288F FALL RISK ASSESSMENT DOCD: CPT | Mod: CPTII,S$GLB,TXP, | Performed by: PHYSICIAN ASSISTANT

## 2025-03-17 PROCEDURE — 20611 DRAIN/INJ JOINT/BURSA W/US: CPT | Mod: S$GLB,TXP,, | Performed by: PHYSICIAN ASSISTANT

## 2025-03-17 RX ORDER — TRIAMCINOLONE ACETONIDE 40 MG/ML
40 INJECTION, SUSPENSION INTRA-ARTICULAR; INTRAMUSCULAR
Status: DISCONTINUED | OUTPATIENT
Start: 2025-03-17 | End: 2025-03-17 | Stop reason: HOSPADM

## 2025-03-17 RX ORDER — BUPIVACAINE HYDROCHLORIDE 2.5 MG/ML
2 INJECTION, SOLUTION INFILTRATION; PERINEURAL
Status: DISCONTINUED | OUTPATIENT
Start: 2025-03-17 | End: 2025-03-17 | Stop reason: HOSPADM

## 2025-03-17 RX ADMIN — BUPIVACAINE HYDROCHLORIDE 2 ML: 2.5 INJECTION, SOLUTION INFILTRATION; PERINEURAL at 10:03

## 2025-03-17 RX ADMIN — TRIAMCINOLONE ACETONIDE 40 MG: 40 INJECTION, SUSPENSION INTRA-ARTICULAR; INTRAMUSCULAR at 10:03

## 2025-03-17 NOTE — PROGRESS NOTES
ESTABLISHED PATIENT    History 3/17/2025:  Jessica returns here today for follow-up evaluation of her right shoulder.  She is having recurrent pain due to her chronic rotator cuff tear.  The last injection gave her relief and got her through [facility].    History 12/16/2024:  Jessica returns here today for follow-up evaluation of her right shoulder.  She complains today of having recurrent pain due to her chronic rotator cuff tear and is requesting a repeat injection.  The previous injection gave her good relief and lasted nearly 3 months.  She is having increased pain over the last few weeks due to packing and moving into a new home.    History 9/16/2024:  Jessica returns here today for follow-up evaluation of her right shoulder.  She complains today of having recurrent pain due to her chronic rotator cuff tear and is requesting a repeat injection.    History 6/17/2024:  Jessica returns here today for follow-up evaluation of her right shoulder.  She complains today of having recurrent pain in his requesting a repeat injection.  She was recently hospitalized due to an incarcerated hernia and had to undergo urgent surgical correction with bowel resection.    History 2/28/2024:  Jessica returns here today for follow-up evaluation of her right shoulder.  She has rotator cuff tear arthropathy and has been treating her symptoms subjectively.  She was last given a subacromial injection in November which gave her temporary relief.  She complains today of having recurrent pain and is requesting a repeat injection.    History 11/29/2023:  Jessica returns to clinic today for follow-up of right shoulder pain.  She wants to discuss options for her right shoulder.  She has had a few significant events occurred recently.  She was initially booked for right reverse total shoulder arthroplasty but secondary to anxiety did not want to proceed with the surgery.  She subsequently had a fall and sustained a left hip fracture which required  a left total hip arthroplasty.  Her primary complaint is still pain in the shoulder but she still has good active range of motion of the right shoulder.  She wants to discuss other minimally invasive options other than a reverse total shoulder replacement.  She wants to discuss a balloon procedure as an option.    History 7/24/2023:  Jessica returns here today for follow-up evaluation of her right shoulder.  She has been attending physical therapy but continues to have constant right shoulder pain with limited range of motion and weakness.  Of note, she is still recovering from a recent total hip arthroplasty due to femoral neck fracture.    History 5/1/2023:   74 y.o. Female with a history of right shoulder pain who is referred today by Armani Cai PA-C. She is retired and she states she has been in physical therapy for a while for her knee and her shoulder. That has taken up most of her time. She has had a fall. She also had a incident a few months ago where she lifted herself up from a low toilet and she felt a pop in the shoulder. Armani gave her a CSI and she had some relief.         History 4/10/2023:  Jessica returns here today for follow-up evaluation of right shoulder.  She has been undergoing physical therapy for her large rotator cuff tear and glenohumeral joint osteoarthritis.  There has been improvement in her pain and function since her last visit.  She is still having difficulty performing simple ADLs such as cooking and trouble lifting objects overhead, but overall she seems to be very happy with her progress.    History 02/28/2023:  Jessica returns here today for follow-up of her right shoulder.  She has a large rotator cuff tear and underlying osteoarthritis.  She has previously failed a subacromial corticosteroid injection and we are attempting to manage her symptoms conservatively.  She was only able to attend 1 visit with physical therapy due to having uncontrollable epistaxis and having to go to  the emergency room and ENT several times over the last 2 weeks.  She complains of having significant right shoulder pain and stiffness.    History 02/06/2023:  Jessica returns here today for follow-up evaluation of her right shoulder.  She was given a subacromial corticosteroid injection at her last visit.  While attempting to lift herself up from a restroom toilet, she felt a pop in her shoulder and had immediate discomfort.  She was seen in the emergency room for this.  A CT arthrogram was ordered for further evaluation of her rotator cuff.  She is here today to discuss the results.  She has been using a shoulder sling intermittently for pain relief.    History 01/23/2023:  Jessica returns here today for follow-up evaluation of her right shoulder.  She states that the Medrol Dosepak gave her very minimal relief.  She has had worsening pain and weakness since her last visit.  She is having a lot of difficulty getting dressed and sleeping at night.  She has been taking Tylenol p.m. at night which has helped her get some sleep.    Of note, she states that her knee pain has resolved following the Orthovisc series.    History 01/10/2023:  72 y.o. Female with a history of right shoulder pain who began having pain 1 week ago.  She denies having any precipitating injury or trauma but states that she was moving a lot of boxes which may have irritated her shoulder.  She is having a lot of discomfort with movement and at night while sleeping.  She has had difficulty getting dressed due to the pain and is frequently dropping objects due to weakness.        Is affecting ADLs.  Pain is 7/10 at it's worst.      REVIEW OF SYSTEMS   Constitution: Negative. Negative for chills, fever and night sweats.   HENT: Negative for congestion and headaches.    Eyes: Negative for blurred vision, left vision loss and right vision loss.   Cardiovascular: Negative for chest pain and syncope.   Respiratory: Negative for cough and shortness of  "breath.    Endocrine: Negative for polydipsia, polyphagia and polyuria.   Hematologic/Lymphatic: Negative for bleeding problem. Does not bruise/bleed easily.   Skin: Negative for dry skin, itching and rash.   Musculoskeletal: Negative for falls. Positive for right shoulder pain  Gastrointestinal: Negative for abdominal pain and bowel incontinence.   Genitourinary: Negative for bladder incontinence and nocturia.   Neurological: Negative for disturbances in coordination, loss of balance and seizures.   Psychiatric/Behavioral: Negative for depression. The patient does not have insomnia.    Allergic/Immunologic: Negative for hives and persistent infections.     PHYSICAL EXAMINATION    Vitals: Ht 5' 3" (1.6 m)   Wt 49.9 kg (110 lb 0.2 oz)   BMI 19.49 kg/m²     General: The patient appears active and healthy with no apparent physical problems.  No disturbance of mood or affect is demonstrated. Alert and Oriented.    HEENT: Eyes normal, pupils equally round, nose normal.    Resp: Equal and symmetrical chest rises. No wheezing    CV: Regular rate    Neck: Supple; nonpainful range of motion.    Extremities: no cyanosis, clubbing, edema, or diffuse swelling.  Palpable pulses, good capillary refill of the digits.  No coolness, discoloration, edema or obvious varicosities.    Skin: no lesions noted.    Lymphatic: no detected adenopathy in the upper or lower extremities.    Neurologic: normal mental status, normal reflexes, normal gait and balance.  Patient is alert and oriented to person, place and time.  No flaccidity or spasticity is noted.  No motor or sensory deficits are noted.  Light touch is intact    Orthopaedic:     SHOULDER EXAM - RIGHT    Inspection:   Normal skin color and appearance with no scars.  No muscle atrophy noted.  No scapular winging.      Palpation: No tenderness of the acromioclavicular joint, lateral edge of the acromion, biceps tendon, trapezius muscle or scapulothoracic bursa.  Crepitus of the " No glenohumeral joint    ROM:      PROM:     FE - 150°    Abd/ER -  80°  Abd/IR -  30°   Add/ER -  40°     AROM:    FE - 150°  with pain beyond 90°.  Abd/ER -  80°  Abd/IR -  30°   Add/ER -  40°  *pain resisted ER       Tests:     - Bonilla, - Neer's, - Cross Arm Adduction, - Horry, - Yerguson, - Speed. - Belly Press,  + Jobes, - Lift Off    Stability: - sulcus, - apprehension, - relocation, - load and shift, - DLS      Motor:  Rotator cuff strength is 4/5 supraspinatus, 4/5 infraspinatus, 5/5 subscapularis. Biceps, triceps and deltoid strength is 5/5.      Neuro     Distally there are no paresthesias, and sensation is intact to light touch in the median, ulnar, and radial distributions.  Reflexes are 2/2 biceps, triceps and brachioradialis.        IMAGING    CT Arthrogram Shoulder Right  Order: [id number]  Status: Final result     Visible to patient: Yes (seen)     Next appt: 07/26/2023 at 11:00 AM in Radiology ([facility] DEXA[facility])     Dx: Acute pain of right shoulder     0 Result Notes  Details    Reading Physician Reading Date Result Priority   Musa Maloney MD  [phone] 2/3/2023 Routine     Narrative & Impression  EXAMINATION:  CT ARTHROGRAM SHOULDER RIGHT     CLINICAL HISTORY:  Shoulder pain, rotator cuff disorder suspected, xray done;  Pain in right shoulder     TECHNIQUE:  Routine right multiplanar CT arthrogram shoulder performed after the intra-articular injection of contrast.     COMPARISON:  None     FINDINGS:  Rotator cuff: There is a large full-thickness rotator cuff tear involving the entire supra and infraspinatus tendons.  There is tendon retraction to the glenoid rim.  The teres minor and subscapularis are intact.  There is mild volume loss of both supra and infraspinatus muscles.  High-riding humeral head noted abutting the acromion.     Mild AC joint arthropathy.     The biceps tendon not identified, probably torn.     Generalized cartilage thinning/joint space narrowing noted with mild osteophytosis  along the inferior margin of the humeral head.  No fractures or marrow replacement process.  No evidence of osteomyelitis.  No axillary lymphadenopathy.  Emphysematous changes noted in the right lung apex.     Impression:     Large full-thickness rotator cuff tear, involving the infraspinatus and supraspinatus tendons, as above.     This report was flagged in Epic as abnormal.        Electronically signed by: Musa Maloney MD  Date:                                            02/03/2023  Time:                                           13:22           Exam Ended: 02/03/23 12:33 Last Resulted: 02/03/23 13:22           X-Ray Shoulder Complete 2 View Right  Order: [id number]  Status: Final result     Visible to patient: Yes (seen)     Next appt: 07/26/2023 at 11:00 AM in Radiology ([facility])     Dx: Right shoulder pain     0 Result Notes  Details    Reading Physician Reading Date Result Priority   Myrna Hameed MD  [phone] 1/27/2023 STAT     Narrative & Impression  EXAMINATION:  XR SHOULDER COMPLETE 2 OR MORE VIEWS RIGHT     CLINICAL HISTORY:  Pain in right shoulder     TECHNIQUE:  Two views of the right shoulder were performed.     COMPARISON:  01/10/2023.     FINDINGS:  No evidence of acute displaced fracture, dislocation, or osseous destructive process.  Suspected remote healed fracture deformity is seen is seen of the right humeral head and neck.  Mild degenerative changes are seen at the glenohumeral and acromioclavicular joints.     Impression:     No acute osseous abnormality identified.        Electronically signed by: Myrna Hameed MD  Date:                                            01/27/2023  Time:                                           17:41           Exam Ended: 01/27/23 17:33 Last Resulted: 01/27/23 17:41               IMPRESSION       ICD-10-CM ICD-9-CM   1. Traumatic complete tear of right rotator cuff, initial encounter  S46.011A 840.4   2. Localized primary osteoarthritis of right shoulder  region  M19.011 715.11               MEDICATIONS PRESCRIBED      None    RECOMMENDATIONS     Continue activity modifications  Repeat CSI of right sub-acromial bursa performed under ultrasound guidance  RTC in 3 months if needed  I advised the patient that based on her current medical comorbidities that surgery right now may not be the best option.  She continue to have relief with a subacromial injection.  I advised her that she can continue to do this for quite some time.  I also advised her she is not a candidate for a balloon procedure.  The only definitive surgery that would help improve her pain would be a reverse total shoulder arthroplasty.  However, I would reserve this procedure until she is medically optimized and subacromial injections have failed.        Large Joint Aspiration/Injection: R subacromial bursa    Date/Time: 3/17/2025 10:30 AM    Performed by: Armani Cai PA-C  Authorized by: Armani Cai PA-C    Consent Done?:  Yes (Verbal)  Indications:  Pain  Site marked: the procedure site was marked    Timeout: prior to procedure the correct patient, procedure, and site was verified    Prep: patient was prepped and draped in usual sterile fashion      Local anesthesia used?: Yes    Local anesthetic:  Co-phenylcaine spray    Details:  Needle Size:  22 G  Ultrasonic Guidance for needle placement?: Yes (Ultrasound guidance was used for needle localization.  Images were saved and stored for documentation.  Dynamic visualization of the needle was continuous throughout the procedure and maintained accurate placement)    Images are saved and documented.  Approach:  Lateral  Location:  Shoulder  Site:  R subacromial bursa  Medications:  2 mL BUPivacaine 0.25% (2.5 mg/ml) 0.25 % (2.5 mg/mL); 40 mg triamcinolone acetonide 40 mg/mL  Patient tolerance:  Patient tolerated the procedure well with no immediate complications      All questions were answered, pt will contact us for questions or concerns in  the interim.

## 2025-03-18 DIAGNOSIS — J84.9 INTERSTITIAL PULMONARY DISEASE, UNSPECIFIED: Primary | ICD-10-CM

## 2025-03-21 ENCOUNTER — PATIENT MESSAGE (OUTPATIENT)
Dept: TRANSPLANT | Facility: CLINIC | Age: 75
End: 2025-03-21
Payer: MEDICARE

## 2025-03-24 DIAGNOSIS — Z98.890 OTHER SPECIFIED POSTPROCEDURAL STATES: ICD-10-CM

## 2025-03-24 NOTE — TELEPHONE ENCOUNTER
No care due was identified.  [facility] Embedded Care Due Messages. Reference number: [id number].   3/24/2025 4:38:19 PM CDT

## 2025-03-25 RX ORDER — CELECOXIB 200 MG/1
CAPSULE ORAL
Qty: 30 CAPSULE | Refills: 2 | Status: SHIPPED | OUTPATIENT
Start: 2025-03-25

## 2025-03-25 NOTE — TELEPHONE ENCOUNTER
Refill Routing Note   Medication(s) are not appropriate for processing by [facility] for the following reason(s):        Outside of protocol    [facility] action(s):  Route               Appointments  past 12m or future 3m with PCP    Date Provider   Last Visit   10/23/2024 Jace Valencia MD   Next Visit   4/24/2025 Jace Valencia MD   ED visits in past 90 days: 0        Note composed:9:32 AM 03/25/2025

## 2025-03-26 ENCOUNTER — OFFICE VISIT (OUTPATIENT)
Dept: PSYCHIATRY | Facility: CLINIC | Age: 75
End: 2025-03-26
Payer: MEDICARE

## 2025-03-26 VITALS
SYSTOLIC BLOOD PRESSURE: 114 MMHG | WEIGHT: 108.94 LBS | DIASTOLIC BLOOD PRESSURE: 74 MMHG | BODY MASS INDEX: 19.29 KG/M2 | HEART RATE: 86 BPM

## 2025-03-26 DIAGNOSIS — I25.10 CORONARY ARTERY DISEASE INVOLVING NATIVE CORONARY ARTERY OF NATIVE HEART WITHOUT ANGINA PECTORIS: ICD-10-CM

## 2025-03-26 DIAGNOSIS — F17.211 NICOTINE DEPENDENCE, CIGARETTES, IN REMISSION: ICD-10-CM

## 2025-03-26 DIAGNOSIS — I65.23 BILATERAL CAROTID ARTERY STENOSIS: ICD-10-CM

## 2025-03-26 DIAGNOSIS — Z87.81 HISTORY OF FRACTURE OF LEFT HIP: ICD-10-CM

## 2025-03-26 DIAGNOSIS — F41.9 ANXIETY: Primary | ICD-10-CM

## 2025-03-26 DIAGNOSIS — R55 SITUATIONAL SYNCOPE: ICD-10-CM

## 2025-03-26 DIAGNOSIS — N18.31 CKD STAGE G3A/A1, GFR 45-59 AND ALBUMIN CREATININE RATIO <30 MG/G: ICD-10-CM

## 2025-03-26 DIAGNOSIS — J43.9 PULMONARY EMPHYSEMA, UNSPECIFIED EMPHYSEMA TYPE: ICD-10-CM

## 2025-03-26 PROCEDURE — 99999 PR PBB SHADOW E&M-EST. PATIENT-LVL II: CPT | Mod: PBBFAC,TXP,, | Performed by: PSYCHIATRY & NEUROLOGY

## 2025-03-26 RX ORDER — MIRTAZAPINE 7.5 MG/1
7.5 TABLET, FILM COATED ORAL NIGHTLY
Qty: 90 TABLET | Refills: 1 | Status: SHIPPED | OUTPATIENT
Start: 2025-03-26 | End: 2025-09-22

## 2025-03-26 RX ORDER — MIRTAZAPINE 7.5 MG/1
7.5 TABLET, FILM COATED ORAL NIGHTLY
Qty: 90 TABLET | Refills: 1 | Status: SHIPPED | OUTPATIENT
Start: 2025-03-26 | End: 2025-03-26

## 2025-03-26 RX ORDER — QUETIAPINE FUMARATE 50 MG/1
50 TABLET, FILM COATED ORAL 2 TIMES DAILY
Qty: 180 TABLET | Refills: 1 | Status: SHIPPED | OUTPATIENT
Start: 2025-03-26 | End: 2025-09-22

## 2025-03-26 NOTE — PATIENT INSTRUCTIONS
PLAN:   Follow Up July 1 2025 @ 3 pm IN CLINIC     Psyc meds: Seroquel 50 mg  TWICE a day #180 x1  Remeron 7.5 mg at bed #90 x1    We discussed her doing some counseling such as [facility] brief evidence based psychotherapy program OR [facility] ([facility]OP) // as well as seeking [facility] counselor so as to attempt to have improved coping skills to manage her anxiety. MD encouraged her to call for more information 9as below       2) Individual Counseling:        A) Call [facility]              [phone] (there may be significant wait times)     B) Contact   your  insurance carrier:  via their Website or call and ask for assistance    C) Psychology Today: [email or web address]     References:     Relaxation stress reduction workbook: ABBY Freeman PhD ( used: $7-10)    Feeling Good Website: Danis Hernandez MD / www.[employer] website (free) / chintan. PODCASTS    Anxiety &  phobia workbook by AHMET Oneal PhD  (web retailers: used: $ 7-10)    [place]: Path to Better Sleep : https://www.veterantraining.[place].gov/insomnia/ (free)     Pt expressed appreciation for the visit today and did not have further question at this time though pt  was still informed to:     Call  if problems.    Call / Report Side Effects to Psyc MD     Encouraged to follow up with primary care / Gen Med MD for continued monitoring of general health and wellness.    Understanding was expressed; and no further concerns nor questions were raised at this time.     Remember healthy self care:   eat right  attempt adequate rest   HANDWASHING / encourage such chintan. During this corona virus time   walk or light exercise within reason and as your general med team approves  read or explore any of reference materials / homework mentioned  reach out (I.e.,  connect with)  others who nuture and bring out best in you  avoid risky behaviors    Keep your appointments:    IF you  cannot make your appt THEN please call  [phone]  or go online (via My Chart pedro) to reschedule.    It is the responsibility of the patient to reschedule an appointment if an appointment has been canceled or missed.    Avoid  alcohol and illicit substances.  Look for the positive.  All is often relative-seek balance  Call sooner if needed : [phone]  Call 911 or go to Emergency Room  (ER)  if  any acute concerns

## 2025-03-26 NOTE — PROGRESS NOTES
"  Jessica Floyd   1950 03/26/2025     Disclaimer: Evaluation and treatment is based on information presented to date. Any new information may affect assessment and findings.     The patient location is: In Clinic      in wheelchair / tho does walk some at home    S: Patient's Own Perception of Condition (& Side Effects) : no side effects      O:      CURRENT PRESENTATION:     Relays was upset being told "advacned lung disease" tho has been told emphysema for some time.    I did read note from MD that states should remain same if remain off smoking     She concerns re difficulty re weight gain; discussed pro con remeron will write fo5 7.5 mg / told if not like or side effects to stop and let MD know  Jessica Floyd is a 74 y.o. female who is on 0 of oxygen.  She is on no assisted ventilation.  Her New [place] Heart Association Class is II and a Karnofsky score of 90% - Able to carry on normal activity: minor symptoms of disease. She is not diabetic.     Here today for routine follow-up.  Since her last visit, she has been doing fair.  Currently she feels poor due to allergies and post nasal gtt.  She takes antihistamines and flonase.  No reflux.  Takes Trelegy.  No prn albuterol.  Remains as active as she can be.  Her biggest complaint continues to be around her weight loss.  Her weight is currently stable but states over the last year, she has lost a significant amount of weight.  Follows with GI and hematology.  Continues to drink protein shakes.  Anxiety somewhat controlled.           Previous Visti  Pt presents for follow-up of her COPD.  She has COPD with PH on TTE.  She was hospitalized in December for acute on chronic hypoxic RF 2/2 influenza.  She recovered and now feels back to her baseline.  She takes her inhalers as directed.  Has an albuterol prn inhaler but does not need to use it.  Remains active.  Does not have hypercapnea or require NIPPV.  She would like testing to see how much O2 she needs as she " "is planning a trip and does not have a portable concentrator to travel with.  She also endorses panic attacks and anxiety/PTSD over medical traumas in the past.  Has been trying to establish with psych without any luck.  Overall, feels at her baseline.       2-28-25 : Advanced lung disease follow-up / Transplant team Hina Roberts, DO   Plan    Followed for COPD.  On Trelegy.  Tolerating well.  Is at baseline following multiple hospitalizations for GI issues.  TTE with ePAP of 40mmHg likely related to hyperinflation from emphysema.  Qualified for oxygen today and discussed re-prescribing oxygen which she is agreeable to.    Requires 2L oxygen with exertion on walk test today.  TTE with ePAP of 40mmHg.      Continue with allergy regimen.         Has anxiety at baseline and feels like she has her own maneuvers to help with calming.  She was upset over using terms "advanced lung disease" today in relation to why it is difficult to gain weight.  Explained that this may be the first time she has heard the term but that she has had severe emphysema for as long as I have seen her.  Explained that with ongoing smoking cessation, her lung disease is likely to stay stable.      Continue with protein supplementation for weight loss.  Explained that given her multiple hospital admissions and severe emphysema, she has reasons to have weight loss.  Her daughter is concerned about a malabsorption process and possible malignancy.  I will reach out to her GI and hematologist just to relay concerns.      Follow-up in 3 months with PFTs and 6MWT.        >>    Goal directed    Calm    No SI  no HI    No Psychosis    Mood: ok     Affect:   shows range    Psyc  Medication:     Seroquel 50 mg at bed #90 x1    Referred in to [facility] on S[facility]quel  /noted off label use THO she is adamant that such is WORKING / largely eliminated spells she was having; denies lightereded / falls; risk benefit discussed and told read package insert and " IF any questions to ask Psyc MD /     MD again offered her Rx for Remeron for her she is interested in trial; MD will write for starting dose 7.5 mg at bed; she is interested in weight gain told her it is possible this could not only help anxiety / mood yet in some instances does cause or contribute to weight gain.  Understanding Expressed        Constitutional Health Concerns: had GI adhesions tho cleared    Vitals:    03/26/25 1359   BP: 114/74   Pulse: 86        Wt Readings from Last 3 Encounters:   03/26/25 49.4 kg (108 lb 14.5 oz)   03/17/25 49.9 kg (110 lb 0.2 oz)   02/28/25 49.9 kg (110 lb)      Laboratory Data  Hospital Outpatient Visit on 02/28/2025   Component Date Value Ref Range Status    Physician Comment 03/01/2025    Final                    Value:Spirometry shows obstruction with severe ventilatory impairment. This impairment may be due to the obstruction alone but restrictive disease is not ruled out. DLCO is severely decreased.   Â   Notes:  Â   Consider lung volume determination to help explain the etiology for the reduced FVC (restriction and/or air trapping).       Pre FVC 03/01/2025 1.64 (L)  1.84 - 3.36 L Final    PRE FEV5 03/01/2025 0.58 (L)  0.73 - 2.44 L Final    Pre FEV1 03/01/2025 0.80 (L)  1.41 - 2.53 L Final    Pre FEV1 FVC 03/01/2025 49.00 (L)  63.55 - 89.76 % Final    Pre FEF 25 75 03/01/2025 0.30 (L)  0.97 - 3.77 L/s Final    Pre PEF 03/01/2025 2.93 (L)  3.43 - 6.75 L/s Final    Pre  03/01/2025 6.93  sec Final    Pre DLCO 03/01/2025 3.18 (L)  14.20 - 25.66 ml/(min*mmHg) Final    DLCO ADJ PRE 03/01/2025 3.33 (L)  14.20 - 25.66 ml/(min*mmHg) Final    DLCOVA PRE 03/01/2025 1.37 (L)  2.69 - 5.66 ml/(min*mmHg*L) Final    DLVAAdj PRE 03/01/2025 1.44 (L)  2.69 - 5.66 ml/(min*mmHg*L) Final    VA PRE 03/01/2025 2.32 (L)  4.62 - 4.62 L Final    IVC PRE 03/01/2025 1.55 (L)  1.84 - 3.36 L Final    FVC Ref 03/01/2025 2.58   Final    FVC LLN 03/01/2025 1.84   Final    FVC Pre Ref 03/01/2025  63.6  % Final    FEV05 REF 03/01/2025 1.58   Final    FEV05 LLN 03/01/2025 0.73   Final    PRE FEV05 REF 03/01/2025 36.5  % Final    FEV1 Ref 03/01/2025 1.98   Final    FEV1 LLN 03/01/2025 1.41   Final    FEV1 Pre Ref 03/01/2025 40.5  % Final    FEV1 FVC Ref 03/01/2025 78   Final    FEV1 FVC LLN 03/01/2025 64   Final    FEV1 FVC Pre Ref 03/01/2025 63.1  % Final    FEF 25 75 Ref 03/01/2025 2.37   Final    FEF 25 75 LLN 03/01/2025 0.97   Final    FEF 25 75 Pre Ref 03/01/2025 12.7  % Final    PEF Ref 03/01/2025 5.09   Final    PEF LLN 03/01/2025 3.43   Final    PEF Pre Ref 03/01/2025 57.5  % Final    DLCO Single Breath Ref 03/01/2025 19.93   Final    DLCO Single Breath LLN 03/01/2025 14.20   Final    DLCO SINGLEBREATH ULN 03/01/2025 25.66   Final    DLCO Single Breath Pre Ref 03/01/2025 16.0  % Final    DLCOc Single Breath Ref 03/01/2025 19.93   Final    DLCOc Single Breath LLN 03/01/2025 14.20   Final    DLCOC SINGLEBREATH ULN 03/01/2025 25.66   Final    DLCOc Single Breath Pre Ref 03/01/2025 16.7  % Final    DLCOVA Ref 03/01/2025 4.18   Final    DLCOVA LLN 03/01/2025 2.69   Final    DLCOVA ULN 03/01/2025 5.66   Final    DLCOVA Pre Ref 03/01/2025 32.8  % Final    DLCOc SBVA Ref 03/01/2025 4.18   Final    DLCOc SBVA LLN 03/01/2025 2.69   Final    DLCOCSBVA ULN 03/01/2025 5.66   Final    DLCOc SBVA Pre Ref 03/01/2025 34.4  % Final    VA Single Breath Ref 03/01/2025 4.62   Final    VA Single Breath LLN 03/01/2025 4.62   Final    VA SINGLEBREATH ULN 03/01/2025 4.62   Final    VA Single Breath Pre Ref 03/01/2025 50.2  % Final    IVC Single Breath Ref 03/01/2025 2.58   Final    IVC Single Breath LLN 03/01/2025 1.84   Final    IVC SINGLEBREATH ULN 03/01/2025 3.36   Final    IVC Single Breath Pre Ref 03/01/2025 60.2  % Final    FVCZSCORE 03/01/2025 -2.11   Final    DDY4XZDHDL 03/01/2025 -3.29   Final    AAA5VZEBXWVIA 03/01/2025 -3.05   Final    DLCOSINGLEBREATHZSCORE 03/01/2025 -4.81   Final    DLCOcSingleBreathZSCORE  "03/01/2025 -4.76   Final      Mental Status Exam:   Appearance: casual / in wheelchair / walks around house ok   Oriented: x 3   Attitude: cooperative engaging   Eye Contact: good   Behavior: sits calm in wheel chair   Mood: says ok"  Cognition: alert  Concentration: grossly intact   Affect: appropriate range   Anxiety: moderate  Thought Process: goal directed   Speech: Volume : WNL   Quantity WNL   Quality: appears to openly answer questions   Eye Contact: good   Threats: no SI / no HI   Memory: intact (as relay information across time spans)  Psychosis: denies all   Estimate of Intellectual Function: upper average   Impulse Control: no history SI / nor HI ; calm here   Musculoskeletal:      Pain Level: R shoulder 8 of 10  / soon to get injection / gets  injection about q 3 months     Social:  x 56yrs [place] ([place] / \She has previously spoke about being  56 yrs /   ([place]) is from country of [place]; pt recounts how they went to [place] to get  because he was 18 years old; she was 17; says back then  in [place] ; a man had to be 21 yr old    Patient Active Problem List   Diagnosis    Coronary artery disease involving native coronary artery of native heart without angina pectoris    Bilateral carotid artery stenosis    Gastroesophageal reflux disease without esophagitis    CKD stage G3a/A1, GFR 45-59 and albumin creatinine ratio <30 mg/g    Hypokalemia    Hypertension    Subclinical hyperthyroidism    Allergic rhinitis    Iron deficiency anemia secondary to blood loss (chronic)    History of fracture of left hip    Leukocytosis    Acute blood loss anemia    Malnutrition of mild degree    Pathological fracture due to osteoporosis    Osteoporosis    Localized osteoporosis of [place]    Impaired mobility    Postnasal drip    Reduced vision-Left eye    Gout    Right rotator cuff tear arthropathy    SOB (shortness of breath)    Eosinophilia    Anxiety    PAC " (premature atrial contraction)    Aortic atherosclerosis    Pulmonary emphysema    Weight loss    Moderate malnutrition    (HFpEF) heart failure with preserved ejection fraction    History Situational (ie Stress Induced) syncope ([facility] 12-25-23    Nicotine dependence, cigarettes, in FULL REMISSION:in past smoked for 17yrs from 16y to about 34y / LUPE:F pack a day    Ischemia, bowel    Hypernatremia    Incarcerated inguinal hernia    Left leg pain    Abrasion    Comfort measures only status    Localized swelling of left lower extremity    Thrombocytosis    Skin ulcer of sacrum, with unspecified severity    Monocytosis          Current Outpatient Medications:     allopurinoL (ZYLOPRIM) 100 MG tablet, Take 2 tablets (200 mg total) by mouth once daily., Disp: 60 tablet, Rfl: 11    amLODIPine (NORVASC) 5 MG tablet, Take 1 tablet (5 mg total) by mouth once daily., Disp: , Rfl:     atorvastatin (LIPITOR) 80 MG tablet, TAKE 1 TABLET BY MOUTH EVERY DAY, Disp: 90 tablet, Rfl: 3    carvediloL (COREG) 6.25 MG tablet, Take 1 tablet (6.25 mg total) by mouth 2 (two) times daily., Disp: 180 tablet, Rfl: 3    celecoxib (CELEBREX) 200 MG capsule, TAKE 1 CAPSULE BY MOUTH DAILY AS NEEDED FOR PAIN., Disp: 30 capsule, Rfl: 2    colchicine (COLCRYS) 0.6 mg tablet, TAKE 1 TABLET (0.6 MG TOTAL) BY MOUTH ONCE DAILY. AS NEEDED FOR GOUT, Disp: 90 tablet, Rfl: 3    cyanocobalamin 1,000 mcg/mL injection, Inject 1,000 mcg into the muscle every 7 days., Disp: 10 mL, Rfl: 0    fluticasone propionate (FLONASE) 50 mcg/actuation nasal spray, 2 sprays (100 mcg total) by Each Nostril route once daily., Disp: , Rfl:     fluticasone-umeclidin-vilanter (TRELEGY ELLIPTA) 200-62.5-25 mcg inhaler, Inhale 1 puff into the lungs once daily., Disp: 60 each, Rfl: 11    hydroCHLOROthiazide (HYDRODIURIL) 25 MG tablet, Take 1 tablet (25 mg total) by mouth once daily., Disp: 90 tablet, Rfl: 3    LIDOcaine 4 % Gel, Apply 1 g topically 2 (two) times daily as  needed (foot pain)., Disp: , Rfl:     LIDOcaine-prilocaine (EMLA) cream, Apply topically as needed., Disp: 30 g, Rfl: 3    mirtazapine (REMERON) 7.5 MG Tab, Take 1 tablet (7.5 mg total) by mouth every evening., Disp: 90 tablet, Rfl: 1    pantoprazole (PROTONIX) 40 MG tablet, Take 1 tablet (40 mg total) by mouth 2 (two) times daily., Disp: 180 tablet, Rfl: 3    QUEtiapine (SEROQUEL) 50 MG tablet, Take 1 tablet (50 mg total) by mouth 2 (two) times daily., Disp: 180 tablet, Rfl: 1    syringe, disposable, 1 mL Syrg, 1 Stick by Misc.(Non-Drug; Combo Route) route once a week., Disp: 25 each, Rfl: 1  No current facility-administered medications for this visit.    Facility-Administered Medications Ordered in Other Visits:     mupirocin 2 % ointment, , Nasal, On Call Procedure, Kang Diaz MD, Given at 10/25/23 1045    tranexamic acid (CYKLOKAPRON) 1,000 mg in sodium chloride 0.9 % 100 mL IVPB (MB+), 1,000 mg, Intravenous, Once, Kang Diaz MD    tranexamic acid (CYKLOKAPRON) 1,000 mg in sodium chloride 0.9 % 100 mL IVPB (MB+), 1,000 mg, Intravenous, Once, Kang Diaz MD     Social History     Tobacco Use   Smoking Status Former    Current packs/day: 0.00    Average packs/day: 0.5 packs/day for 20.0 years (10.0 ttl pk-yrs)    Types: Cigarettes    Start date: 1979    Quit date: 1999    Years since quittin.2    Passive exposure: Past   Smokeless Tobacco Never        Review of patient's allergies indicates:   Allergen Reactions    Hydromorphone Anxiety, Other (See Comments) and Hallucinations     Severe agitation      Psychotherapy:  Target symptoms: anxiety , medical (diff to gain wt COPD), relationship with   Why chosen therapy is appropriate versus another modality: relevant to diagnosis  Outcome monitoring methods: self-report, lab data, observation  Therapeutic intervention type: insight oriented psychotherapy, supportive psychotherapy  Topics discussed/themes:  see target symptoms  The  patient's response to the intervention is accepting. The patient's progress toward treatment goals is fair.   Duration of intervention: 20 minutes.     ASSESSMENT:   Encounter Diagnoses   Name Primary?    Anxiety, unspecified, tho prominent generalized features (ie, worrier / chintan whenexacerbated by flare ups of  health conditions Yes    Pulmonary emphysema, unspecified emphysema type     Nicotine dependence, cigarettes, in FULL REMISSION:in past smoked for 17yrs from 16y to about 34y / LUPE:F pack a day     CKD stage G3a/A1, GFR 45-59 and albumin creatinine ratio <30 mg/g     History of fracture of left hip     History Situational (ie Stress Anxiety Induced) REFLEX SYNCOPE syncope ([facility] 12-25-23     Coronary artery disease involving native coronary artery of native heart without angina pectoris     Bilateral carotid artery stenosis        Patient Instructions       PLAN:   Follow Up July 1 2025 @ 3 pm IN CLINIC     Psyc meds: Seroquel 50 mg  TWICE a day #180 x1  Remeron 7.5 mg at bed #90 x1    We discussed her doing some counseling such as [facility] brief evidence based psychotherapy program OR [facility]  Intensive Outpatient Program (IOP) // as well as seeking [facility] counselor so as to attempt to have improved coping skills to manage her anxiety. MD encouraged her to call for more information 9as below       2) Individual Counseling:        A) Call [facility]              [phone] (there may be significant wait times)     B) Contact   your  insurance carrier:  via their Website or call and ask for assistance    C) Psychology Today: www.psychologytoday.com/us/therapists     References:     Relaxation stress reduction workbook: ABBY Freeman PhD ( used: $7-10)    Feeling Good Website: Danis Hernandez MD / [email or web address] Sciences.com website (free) / chintan. PODCASTS    Anxiety &  phobia workbook by AHMET Oneal PhD  (web retailers: used: $ 7-10)    VA: Path to Better Sleep :  "https://www.veterantraining.va.gov/insomnia/ (free)     Pt expressed appreciation for the visit today and did not have further question at this time though pt  was still informed to:     Call  if problems.    Call / Report Side Effects to Psyc MD     Encouraged to follow up with primary care / Gen Med MD for continued monitoring of general health and wellness.    Understanding was expressed; and no further concerns nor questions were raised at this time.     Remember healthy self care:   eat right  attempt adequate rest   HANDWASHING / encourage such chintan. During this corona virus time   walk or light exercise within reason and as your general med team approves  read or explore any of reference materials / homework mentioned  reach out (I.e.,  connect with)  others who nuture and bring out best in you  avoid risky behaviors    Keep your appointments:    IF you  cannot make your appt THEN please call [phone]  or go online (via My Chart pedro) to reschedule.    It is the responsibility of the patient to reschedule an appointment if an appointment has been canceled or missed.    Avoid  alcohol and illicit substances.  Look for the positive.  All is often relative-seek balance  Call sooner if needed : [phone]  Call 911 or go to Emergency Room  (ER)  if  any acute concerns     >>Excerpt from Initial Psaddy MD marilyal from 4-17-24 <<    Jessica Floyd initially presented to psyc clinic as a 73 y.o. female (5' 3" and 116 lbs) sittingin wheelchair.  stayed in wait area tho seems caring well intended.     Pt was referred by [facility] pulmonologist: Alejandra WALLER.      She is a former smoker of 18 yrs (from 16y to 34y).     She has pulmonary emphysema; and reports getting heightened anxiety at times (often assoc with her pulmonary status and such has has led to situational reflex syncope.      Pt says such was originally diag in [place] yrs ago.     Says that she a had recent bout in 12- and [facility] "[facility],     She does present as "worrier" / and seems to have anticipatory worry. .     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      She has Been  x 57y to [place] ("[place] HE was director prop [employer] [facility] [employer] for [facility]; did that 3 yrs     He retired and did facilities [employer] for credit aguirre ; based in [place] 12y and then he went head [facility] ; so moved [facility] x 6yrs/ for a time he was also placed [employer] x 3 yrs while she had moved to [place].     Says when gets stressed nervous; she says was called reflex syncope; says has had since diag in [facility] (2015).     Says when was put on seroquel in Feb 2024 when in hospital ; says has not had ANY reflex syncopal    Excerpt of 12-25-23 note of [facility] Hospitalist Earline Hernández MD     * Acute hypoxic respiratory failure  Patient with Hypoxic Respiratory failure which is Acute on chronic. she is on home oxygen at 1L PRN LPM. Supplemental oxygen was provided and noted- Oxygen Concentration (%): [30] 30 Signs/symptoms of respiratory failure include- tachypnea, increased work of breathing, respiratory distress, and wheezing. Contributing diagnoses includes - Aspiration, CHF, COPD, and Pneumonia Labs and images were reviewed. Patient Has not had a recent ABG. Will treat underlying causes and adjust management of respiratory failure as follows     73-year-old female with a past medical history of former smoking, CAD, R Carotid stent, dyslipidemia, HTN, gout, anxiety, prev SAH (1999) COPD on home O2 (1L PRN), mild pulmonary hypertension, stress induced reflex syncope, history of ESBL UTI, now presenting with chief complaint of " "shortness of breath.  Patient reports that yesterday during the day she experienced worsening shortness of breath and cough.  She has had SOB for weeks, possibly months, and a lingering cough since her COVID infection last month but yesterday had an acute change. Rebel reyes she was not feeling well, and around 6 or 7 pm she called ambulence. After some SOB at home and 3L didn't help. Was observed by  to be satting 81 or 82. Baseline is 94 or 95. Patient denies any hemoptysis, fevers, or sick contacts. Admits to SAMPSON, productive gray cough, chest tightness with exertion improved with rest, leg swelling, poor exercise tolerance, and balance issues. Reports weight loss of 20-30 lbs since 6 months. Vaxed for flu rsv, pna, and covid. Follows with pulm OP. Recent hospitalization 1 month ago for Covid.  claims pt "does not walk at all" and gets SOB after walking to the restroom. Uses asistance from others or cane / walker to ambulate.      EMS reports that patient had saturation of 80% on room air requiring 3 L nasal cannula.  Albuterol neb was given by EMS in addition to 125 mg of methylprednisone. In the Ed at presentation her vitals were: /100 P116 RR22 98.5F O2% 93, Initally requiring Bipap, now satting 97 with 8L NC s/p breathing treatment. WBC 31.6 H &H 10.7 33.1  Na 133 K 2.9 CO2 20 Albumin 2.6  Trop WNL Lactate WNL Flu A+ PCO2 33 Po 26 PHCO3 21.7 EKG? Sinus Tach around 115, T wave inversions V3-V5, II, III, AVF, Xray: Patchy right basilar consolidation may relate to infection/pneumonia or aspiration.  Clinical correlation and continued imaging follow-up is recommended. Procal +.      Receive Rocephin, Azithro, Mag, and Steroids in ED. Admitted to [facility] for management of Acute Hypoxic Respiratory Failure.         Plan  Q4Hr Breathing treatments  CAP coverage with Ceftriaxone and Azithro  Tamiflu for influenza   Lasix for volume overload 2/2 HFpEF  Continue home inhalers  Prednisone 40 mg " "qd  Ween O2 as needed      No psychosis     No SI no HI     No evidence of rekha     No substance use     Denies prior behavioral health treatment / says no counseling nor seeing psyc providers / did not santo on me     Past Psychiatric History:      Previous psychiatric treatment and medication trials: comes in on SEROQUEL 50 mg  Previous psychiatric hospitalizations: No  Previous diagnoses: No  Previous suicide attempts: No     Abuse History:   Physical Abuse: No  Sexual Abuse: No  Other Abuse / Trauma : No     Substance Abuse History:  Use of alcohol: one glass a day / no control issues  Tobacco use: FORMER SMOKER about 17yrs from 16y to about 34y / smoked LUPE:F pack a day  Cannabis: no  Recreational drugs: none     Family History Mental Health:   Suicide : No  Other: No     Weapons: No     Psyc Meds:   Seroquel 50 mg at bed     Top 3 Stressors: health pulmonary (says esa rain)      Cerebral aneurysm "CLIP in [place] 1998" [facility]      Musculoskeletal : no tremors   History Seizures? Mini seizure and pass out / reflux syncope     History Head Injury? N tho has clip for aneurysm    3 wishes ? Health / stamina  Psychosocial History :     City [facility]: [facility]     Siblings (full or half)  Brothers: 1 who passed 67y health issues  Sisters: none     Parents :     Briefly Describe your Mom: hard working uneducated tho wished she could have been educated worked 2-3 / she was from [place]  Briefly Describe your Dad: [employer]IC [employer] / he was from [place] / (pt sponsored him for citizen when she 17y ; never abusive ; he was kind loving     Bio Mom: Occupation:  /     Bio Dad: Occupation: in [place]     Marital Status: one time / 57 yrs     Children   Girls (ages): three Hannah (54y / outside [place]/ psyc ); Aby (49y [facility] [employer] charter [employer] 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 none  DWI ?n  custodial time? n     Ever homeless? n     Employment:   Last Job? As below  Longest Job? [employer] 21y (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

## 2025-04-02 ENCOUNTER — PATIENT MESSAGE (OUTPATIENT)
Dept: RHEUMATOLOGY | Facility: CLINIC | Age: 75
End: 2025-04-02
Payer: MEDICARE

## 2025-04-02 DIAGNOSIS — E79.0 HYPERURICEMIA: ICD-10-CM

## 2025-04-02 RX ORDER — ALLOPURINOL 200 MG/1
300 TABLET ORAL DAILY
Qty: 90 TABLET | Refills: 3 | Status: SHIPPED | OUTPATIENT
Start: 2025-04-02 | End: 2025-04-04

## 2025-04-04 RX ORDER — ALLOPURINOL 200 MG/1
300 TABLET ORAL DAILY
Qty: 90 TABLET | Refills: 3 | Status: SHIPPED | OUTPATIENT
Start: 2025-04-04 | End: 2026-04-04

## 2025-04-10 ENCOUNTER — LAB VISIT (OUTPATIENT)
Dept: LAB | Facility: OTHER | Age: 75
End: 2025-04-10
Attending: INTERNAL MEDICINE
Payer: MEDICARE

## 2025-04-10 DIAGNOSIS — Z01.84 IMMUNITY STATUS TESTING: ICD-10-CM

## 2025-04-10 PROCEDURE — 86735 MUMPS ANTIBODY: CPT | Mod: TXP

## 2025-04-10 PROCEDURE — 86765 RUBEOLA ANTIBODY: CPT | Mod: TXP

## 2025-04-10 PROCEDURE — 86762 RUBELLA ANTIBODY: CPT | Mod: TXP

## 2025-04-10 PROCEDURE — 36415 COLL VENOUS BLD VENIPUNCTURE: CPT | Mod: TXP

## 2025-04-11 LAB
MUMPS IGG (OHS): 268 AU/ML
MUMPS IGG INTERPRETATION (OHS): POSITIVE
RUBEOLA (MEASLES) IGG (OHS): >300 AU/ML
RUBEOLA IGG INTERP. (OHS): POSITIVE
RUBV IGG SER-ACNC: 11.7 IU/ML
RUBV IGG SER-IMP: REACTIVE

## 2025-04-13 ENCOUNTER — RESULTS FOLLOW-UP (OUTPATIENT)
Dept: INTERNAL MEDICINE | Facility: CLINIC | Age: 75
End: 2025-04-13

## 2025-04-13 ENCOUNTER — PATIENT MESSAGE (OUTPATIENT)
Dept: INTERNAL MEDICINE | Facility: CLINIC | Age: 75
End: 2025-04-13
Payer: MEDICARE

## 2025-04-24 ENCOUNTER — OFFICE VISIT (OUTPATIENT)
Dept: INTERNAL MEDICINE | Facility: CLINIC | Age: 75
End: 2025-04-24
Payer: MEDICARE

## 2025-04-24 VITALS
SYSTOLIC BLOOD PRESSURE: 120 MMHG | HEIGHT: 63 IN | HEART RATE: 80 BPM | BODY MASS INDEX: 19.14 KG/M2 | OXYGEN SATURATION: 98 % | WEIGHT: 108 LBS | DIASTOLIC BLOOD PRESSURE: 78 MMHG

## 2025-04-24 DIAGNOSIS — F41.9 SEVERE ANXIETY: ICD-10-CM

## 2025-04-24 DIAGNOSIS — Z09 FOLLOW-UP EXAM: Primary | ICD-10-CM

## 2025-04-24 DIAGNOSIS — M10.9 GOUT, UNSPECIFIED CAUSE, UNSPECIFIED CHRONICITY, UNSPECIFIED SITE: Chronic | ICD-10-CM

## 2025-04-24 DIAGNOSIS — E53.8 B12 DEFICIENCY: ICD-10-CM

## 2025-04-24 DIAGNOSIS — H10.9 CONJUNCTIVITIS OF RIGHT EYE, UNSPECIFIED CONJUNCTIVITIS TYPE: ICD-10-CM

## 2025-04-24 DIAGNOSIS — H02.401 PTOSIS OF RIGHT EYELID: ICD-10-CM

## 2025-04-24 DIAGNOSIS — J43.2 CENTRILOBULAR EMPHYSEMA: ICD-10-CM

## 2025-04-24 PROCEDURE — 3288F FALL RISK ASSESSMENT DOCD: CPT | Mod: CPTII,S$GLB,, | Performed by: INTERNAL MEDICINE

## 2025-04-24 PROCEDURE — 1101F PT FALLS ASSESS-DOCD LE1/YR: CPT | Mod: CPTII,S$GLB,, | Performed by: INTERNAL MEDICINE

## 2025-04-24 PROCEDURE — 99999 PR PBB SHADOW E&M-EST. PATIENT-LVL IV: CPT | Mod: PBBFAC,,, | Performed by: INTERNAL MEDICINE

## 2025-04-24 PROCEDURE — 3074F SYST BP LT 130 MM HG: CPT | Mod: CPTII,S$GLB,, | Performed by: INTERNAL MEDICINE

## 2025-04-24 PROCEDURE — 1159F MED LIST DOCD IN RCRD: CPT | Mod: CPTII,S$GLB,, | Performed by: INTERNAL MEDICINE

## 2025-04-24 PROCEDURE — 99214 OFFICE O/P EST MOD 30 MIN: CPT | Mod: S$GLB,,, | Performed by: INTERNAL MEDICINE

## 2025-04-24 PROCEDURE — 3078F DIAST BP <80 MM HG: CPT | Mod: CPTII,S$GLB,, | Performed by: INTERNAL MEDICINE

## 2025-04-24 PROCEDURE — 1125F AMNT PAIN NOTED PAIN PRSNT: CPT | Mod: CPTII,S$GLB,, | Performed by: INTERNAL MEDICINE

## 2025-04-24 RX ORDER — CYANOCOBALAMIN 1000 UG/ML
INJECTION, SOLUTION INTRAMUSCULAR; SUBCUTANEOUS
Qty: 10 ML | Refills: 5 | Status: SHIPPED | OUTPATIENT
Start: 2025-04-24

## 2025-04-24 RX ORDER — ERYTHROMYCIN 5 MG/G
OINTMENT OPHTHALMIC NIGHTLY
Qty: 3.5 G | Refills: 0 | Status: SHIPPED | OUTPATIENT
Start: 2025-04-24

## 2025-04-24 NOTE — PROGRESS NOTES
Subjective:      History of Present Illness    CHIEF COMPLAINT:  Jessica presents for a follow-up visit to discuss ongoing health concerns, including eye irritation and COPD management.    HPI:  Jessica reports ongoing irritation in her right eye, describing it as constantly irritated with a gritty sensation, excessive discharge, and occasional eyelid closure. She was previously prescribed erythromycin ointment, which provided temporary relief, but the issue has recurred. She notes increased redness in the eye today due to exposure to a wet night pool yesterday.    Jessica has a history of COPD and emphysema, diagnosed approximately 2 years ago. She reports a history of COVID pneumonia and influenza, which exacerbated her condition. She uses oxygen as needed and has a portable oxygen device. She has dyspnea with overexertion and in hot weather. Jessica also reports having reflux syncope triggered by anxiety and stress, which can lead to seizures and loss of consciousness.    Jessica describes a recent negative interaction with her pulmonologist, Dr. Shapley, who informed her that her COPD was in an advanced stage. This information caused significant anxiety for patient, leading to social isolation for nearly 3 weeks. She expresses frustration with the sudden change in her diagnosis and the manner in which it was communicated.    Jessica manages her condition through various methods, including yoga breathing techniques and using a ceiling fan to help regulate her breathing during episodes of dyspnea. She states a preference for these methods before resorting to supplemental oxygen.    Jessica denies any chest pain or shortness of breath out of the ordinary.    MEDICAL HISTORY:  Jessica has a history of COPD/Emphysema, diagnosed 2 years ago. She also has severe anxiety disorder, reflux syncope, B12 deficiency, and has had COVID pneumonia and influenza. Jessica is fully vaccinated and boosted for COVID-19. She received a  measles vaccination in the past and still has adequate immunity with an IgG level of 11.    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   HENT:  Negative for congestion, hearing loss, postnasal drip, sneezing, sore throat, trouble swallowing and voice change.    Eyes:  Negative for pain and discharge.   Respiratory:  Negative for cough, choking, chest tightness, shortness of breath and wheezing.    Cardiovascular:  Negative for chest pain, palpitations and leg swelling.   Gastrointestinal:  Negative for abdominal distention, abdominal pain, blood in stool, constipation, diarrhea, nausea and vomiting.   Endocrine: Negative for polydipsia and polyuria.   Genitourinary:  Negative for difficulty urinating and flank pain.   Musculoskeletal:  Negative for arthralgias, back pain, joint swelling, myalgias and neck pain.   Skin:  Negative for rash.   Neurological:  Negative for dizziness, tremors, seizures, weakness, numbness and headaches.   Psychiatric/Behavioral:  Negative for agitation. The patient is not nervous/anxious.         Past medical history, surgical history, and family medical history reviewed and updated as appropriate.    Medications and allergies reviewed.     Objective:          Vitals:    04/24/25 1013   BP: 120/78   BP Location: Left arm   Patient Position: Sitting   Pulse: 80   SpO2: 98%   Weight: 49 kg (108 lb 0.4 oz)  "  Height: 5' 3" (1.6 m)     Body mass index is 19.14 kg/m².  Physical Exam  Constitutional:       Appearance: She is well-developed.   HENT:      Head: Normocephalic and atraumatic.   Eyes:      Extraocular Movements: Extraocular movements intact.   Cardiovascular:      Rate and Rhythm: Normal rate and regular rhythm.      Heart sounds: Normal heart sounds.   Pulmonary:      Effort: Pulmonary effort is normal. No respiratory distress.      Breath sounds: Normal breath sounds. No wheezing.   Abdominal:      General: Bowel sounds are normal. There is no distension.      Palpations: Abdomen is soft.      Tenderness: There is no abdominal tenderness.   Musculoskeletal:         General: No tenderness. Normal range of motion.      Cervical back: Normal range of motion.   Skin:     General: Skin is warm and dry.   Neurological:      Mental Status: She is alert and oriented to person, place, and time.      Cranial Nerves: No cranial nerve deficit.      Deep Tendon Reflexes: Reflexes are normal and symmetric.         Lab Results   Component Value Date    WBC 16.43 (H) 02/03/2025    HGB 12.0 02/03/2025    HCT 37.4 02/03/2025     02/03/2025    CHOL 176 04/05/2022    TRIG 88 04/05/2022    HDL 69 04/05/2022    ALT 32 02/03/2025    AST 32 02/03/2025     02/03/2025    K 3.4 (L) 02/03/2025     02/03/2025    CREATININE 0.9 02/03/2025    BUN 21 02/03/2025    CO2 25 02/03/2025    TSH 0.719 10/28/2023    INR 1.0 07/04/2024    HGBA1C 5.2 11/18/2023       Assessment:       1. Follow-up exam    2. B12 deficiency    3. Conjunctivitis of right eye, unspecified conjunctivitis type    4. Ptosis of right eyelid    5. Severe anxiety    6. Centrilobular emphysema    7. Gout, unspecified cause, unspecified chronicity, unspecified site          Plan:     Jessica was seen today for follow-up.    Diagnoses and all orders for this visit:    Follow-up exam    B12 deficiency  -     cyanocobalamin 1,000 mcg/mL injection; Inject 1,000 " mcg into the muscle every 7 days.    Conjunctivitis of right eye, unspecified conjunctivitis type  Comments:  allergic vs bacterial conjunctivitis, refilled erythomycin, will refer to optometry for evaluation as patient has had recurrent issues  Orders:  -     Ambulatory referral/consult to Optometry; Future  -     erythromycin (ROMYCIN) ophthalmic ointment; Place into the right eye every evening. Apply 1/2 inch ribbon.    Ptosis of right eyelid  -     Ambulatory referral/consult to Optometry; Future    Severe anxiety  Comments:  follows with psychiatry (Dr. Gallegos)    Centrilobular emphysema  Comments:  COPD - on trelegy, has portable O2    Gout, unspecified cause, unspecified chronicity, unspecified site  Comments:  on allopurinol, colchicine        Health maintenance reviewed with patient.   Follow up in about 6 months (around 10/24/2025).    Jace Valencia MD  Internal Medicine / Primary Care  [facility]  4/24/2025    This note was generated with the assistance of ambient listening technology. Verbal consent was obtained by the patient and accompanying visitor(s) for the recording of patient appointment to facilitate this note. I attest to having reviewed and edited the generated note for accuracy, though some syntax or spelling errors may persist. Please contact the author of this note for any clarification.

## 2025-04-29 ENCOUNTER — LAB VISIT (OUTPATIENT)
Dept: LAB | Facility: OTHER | Age: 75
End: 2025-04-29
Attending: INTERNAL MEDICINE
Payer: MEDICARE

## 2025-04-29 DIAGNOSIS — D53.9 MACROCYTIC ANEMIA: ICD-10-CM

## 2025-04-29 LAB
ABSOLUTE EOSINOPHIL (OHS): 1.21 K/UL
ABSOLUTE MONOCYTE (OHS): 2.61 K/UL (ref 0.3–1)
ABSOLUTE NEUTROPHIL COUNT (OHS): 9.46 K/UL (ref 1.8–7.7)
ALBUMIN SERPL BCP-MCNC: 3.3 G/DL (ref 3.5–5.2)
ALP SERPL-CCNC: 92 UNIT/L (ref 40–150)
ALT SERPL W/O P-5'-P-CCNC: 25 UNIT/L (ref 10–44)
ANION GAP (OHS): 8 MMOL/L (ref 8–16)
ANISOCYTOSIS BLD QL SMEAR: SLIGHT
AST SERPL-CCNC: 31 UNIT/L (ref 11–45)
BASOPHILS # BLD AUTO: 0.25 K/UL
BASOPHILS NFR BLD AUTO: 1.6 %
BILIRUB SERPL-MCNC: 0.8 MG/DL (ref 0.1–1)
BUN SERPL-MCNC: 23 MG/DL (ref 8–23)
CALCIUM SERPL-MCNC: 9 MG/DL (ref 8.7–10.5)
CHLORIDE SERPL-SCNC: 104 MMOL/L (ref 95–110)
CO2 SERPL-SCNC: 25 MMOL/L (ref 23–29)
CREAT SERPL-MCNC: 0.8 MG/DL (ref 0.5–1.4)
CRP SERPL-MCNC: 1.6 MG/L
ERYTHROCYTE [DISTWIDTH] IN BLOOD BY AUTOMATED COUNT: 17.5 % (ref 11.5–14.5)
ERYTHROCYTE [SEDIMENTATION RATE] IN BLOOD BY PHOTOMETRIC METHOD: 66 MM/HR
FERRITIN SERPL-MCNC: 55 NG/ML (ref 20–300)
GFR SERPLBLD CREATININE-BSD FMLA CKD-EPI: >60 ML/MIN/1.73/M2
GLUCOSE SERPL-MCNC: 95 MG/DL (ref 70–110)
HCT VFR BLD AUTO: 37.7 % (ref 37–48.5)
HGB BLD-MCNC: 11.8 GM/DL (ref 12–16)
HYPOCHROMIA BLD QL SMEAR: NORMAL
IMM GRANULOCYTES # BLD AUTO: 0.16 K/UL (ref 0–0.04)
IMM GRANULOCYTES NFR BLD AUTO: 1 % (ref 0–0.5)
IRON SATN MFR SERPL: 13 % (ref 20–50)
IRON SERPL-MCNC: 60 UG/DL (ref 30–160)
LYMPHOCYTES # BLD AUTO: 2.17 K/UL (ref 1–4.8)
MCH RBC QN AUTO: 32.8 PG (ref 27–31)
MCHC RBC AUTO-ENTMCNC: 31.3 G/DL (ref 32–36)
MCV RBC AUTO: 105 FL (ref 82–98)
NUCLEATED RBC (/100WBC) (OHS): 0 /100 WBC
PLATELET # BLD AUTO: 390 K/UL (ref 150–450)
PLATELET BLD QL SMEAR: NORMAL
PMV BLD AUTO: 9.9 FL (ref 9.2–12.9)
POTASSIUM SERPL-SCNC: 4 MMOL/L (ref 3.5–5.1)
PROT SERPL-MCNC: 7.8 GM/DL (ref 6–8.4)
RBC # BLD AUTO: 3.6 M/UL (ref 4–5.4)
RELATIVE EOSINOPHIL (OHS): 7.6 %
RELATIVE LYMPHOCYTE (OHS): 13.7 % (ref 18–48)
RELATIVE MONOCYTE (OHS): 16.5 % (ref 4–15)
RELATIVE NEUTROPHIL (OHS): 59.6 % (ref 38–73)
SODIUM SERPL-SCNC: 137 MMOL/L (ref 136–145)
TIBC SERPL-MCNC: 459 UG/DL (ref 250–450)
TOXIC GRANULES BLD QL SMEAR: PRESENT
TRANSFERRIN SERPL-MCNC: 310 MG/DL (ref 200–375)
WBC # BLD AUTO: 15.86 K/UL (ref 3.9–12.7)

## 2025-04-29 PROCEDURE — 86140 C-REACTIVE PROTEIN: CPT | Mod: TXP

## 2025-04-29 PROCEDURE — 85060 BLOOD SMEAR INTERPRETATION: CPT | Mod: NTX,,, | Performed by: STUDENT IN AN ORGANIZED HEALTH CARE EDUCATION/TRAINING PROGRAM

## 2025-04-29 PROCEDURE — 80053 COMPREHEN METABOLIC PANEL: CPT | Mod: TXP

## 2025-04-29 PROCEDURE — 82040 ASSAY OF SERUM ALBUMIN: CPT | Mod: TXP

## 2025-04-29 PROCEDURE — 84466 ASSAY OF TRANSFERRIN: CPT | Mod: TXP

## 2025-04-29 PROCEDURE — 88189 FLOWCYTOMETRY/READ 16 & >: CPT | Mod: NTX,,, | Performed by: PATHOLOGY

## 2025-04-29 PROCEDURE — 85025 COMPLETE CBC W/AUTO DIFF WBC: CPT | Mod: TXP

## 2025-04-29 PROCEDURE — 88185 FLOWCYTOMETRY/TC ADD-ON: CPT | Mod: 91,TXP

## 2025-04-29 PROCEDURE — 82728 ASSAY OF FERRITIN: CPT | Mod: TXP

## 2025-04-29 PROCEDURE — 88184 FLOWCYTOMETRY/ TC 1 MARKER: CPT | Mod: TXP

## 2025-04-29 PROCEDURE — 83540 ASSAY OF IRON: CPT | Mod: TXP

## 2025-04-29 PROCEDURE — 36415 COLL VENOUS BLD VENIPUNCTURE: CPT | Mod: TXP

## 2025-04-29 PROCEDURE — 83921 ORGANIC ACID SINGLE QUANT: CPT | Mod: TXP

## 2025-04-29 PROCEDURE — 85652 RBC SED RATE AUTOMATED: CPT | Mod: TXP

## 2025-04-29 PROCEDURE — 84238 ASSAY NONENDOCRINE RECEPTOR: CPT | Mod: TXP

## 2025-04-30 LAB — PATHOLOGIST REVIEW - CBC/DIFF (OHS): NORMAL

## 2025-05-01 LAB
LAB FLOW CYTOMETRY ANTIBODIES ANALYZED (OHS): NORMAL
LAB FLOW CYTOMETRY COMMENT (OHS): NORMAL
LAB FLOW CYTOMETRY INTERPRETATION (OHS): NORMAL
LABORATORY COMMENT REPORT: NORMAL
STFR SERPL-MCNC: 7.1 MG/L (ref 1.8–4.6)

## 2025-05-02 ENCOUNTER — PATIENT MESSAGE (OUTPATIENT)
Dept: HEMATOLOGY/ONCOLOGY | Facility: CLINIC | Age: 75
End: 2025-05-02
Payer: MEDICARE

## 2025-05-05 ENCOUNTER — RESULTS FOLLOW-UP (OUTPATIENT)
Dept: HEMATOLOGY/ONCOLOGY | Facility: CLINIC | Age: 75
End: 2025-05-05

## 2025-05-05 LAB — W METHYLMALONIC ACID: 0.46 UMOL/L

## 2025-05-06 ENCOUNTER — PATIENT MESSAGE (OUTPATIENT)
Dept: HEMATOLOGY/ONCOLOGY | Facility: CLINIC | Age: 75
End: 2025-05-06

## 2025-05-06 ENCOUNTER — OFFICE VISIT (OUTPATIENT)
Dept: HEMATOLOGY/ONCOLOGY | Facility: CLINIC | Age: 75
End: 2025-05-06
Payer: MEDICARE

## 2025-05-06 VITALS
RESPIRATION RATE: 12 BRPM | WEIGHT: 110.44 LBS | BODY MASS INDEX: 19.57 KG/M2 | OXYGEN SATURATION: 95 % | DIASTOLIC BLOOD PRESSURE: 59 MMHG | SYSTOLIC BLOOD PRESSURE: 110 MMHG | TEMPERATURE: 98 F | HEIGHT: 63 IN | HEART RATE: 74 BPM

## 2025-05-06 DIAGNOSIS — D72.821 MONOCYTOSIS: ICD-10-CM

## 2025-05-06 DIAGNOSIS — J43.9 PULMONARY EMPHYSEMA, UNSPECIFIED EMPHYSEMA TYPE: Chronic | ICD-10-CM

## 2025-05-06 DIAGNOSIS — I50.30 HEART FAILURE WITH PRESERVED EJECTION FRACTION, UNSPECIFIED HF CHRONICITY: ICD-10-CM

## 2025-05-06 DIAGNOSIS — D72.829 LEUKOCYTOSIS, UNSPECIFIED TYPE: ICD-10-CM

## 2025-05-06 DIAGNOSIS — K40.30 INCARCERATED INGUINAL HERNIA: ICD-10-CM

## 2025-05-06 DIAGNOSIS — R63.4 WEIGHT LOSS: ICD-10-CM

## 2025-05-06 DIAGNOSIS — D53.9 MACROCYTIC ANEMIA: Primary | ICD-10-CM

## 2025-05-06 DIAGNOSIS — D72.19 OTHER EOSINOPHILIA: ICD-10-CM

## 2025-05-06 PROBLEM — R09.82 POSTNASAL DRIP: Status: RESOLVED | Noted: 2023-09-20 | Resolved: 2025-05-06

## 2025-05-06 PROCEDURE — 1160F RVW MEDS BY RX/DR IN RCRD: CPT | Mod: CPTII,S$GLB,, | Performed by: INTERNAL MEDICINE

## 2025-05-06 PROCEDURE — 1125F AMNT PAIN NOTED PAIN PRSNT: CPT | Mod: CPTII,S$GLB,, | Performed by: INTERNAL MEDICINE

## 2025-05-06 PROCEDURE — 1159F MED LIST DOCD IN RCRD: CPT | Mod: CPTII,S$GLB,, | Performed by: INTERNAL MEDICINE

## 2025-05-06 PROCEDURE — 3078F DIAST BP <80 MM HG: CPT | Mod: CPTII,S$GLB,, | Performed by: INTERNAL MEDICINE

## 2025-05-06 PROCEDURE — 3074F SYST BP LT 130 MM HG: CPT | Mod: CPTII,S$GLB,, | Performed by: INTERNAL MEDICINE

## 2025-05-06 PROCEDURE — 3288F FALL RISK ASSESSMENT DOCD: CPT | Mod: CPTII,S$GLB,, | Performed by: INTERNAL MEDICINE

## 2025-05-06 PROCEDURE — 99215 OFFICE O/P EST HI 40 MIN: CPT | Mod: S$GLB,,, | Performed by: INTERNAL MEDICINE

## 2025-05-06 PROCEDURE — 99999 PR PBB SHADOW E&M-EST. PATIENT-LVL IV: CPT | Mod: PBBFAC,,, | Performed by: INTERNAL MEDICINE

## 2025-05-06 PROCEDURE — 1101F PT FALLS ASSESS-DOCD LE1/YR: CPT | Mod: CPTII,S$GLB,, | Performed by: INTERNAL MEDICINE

## 2025-05-06 NOTE — PROGRESS NOTES
[facility]     Outpatient Hematology/Medical Oncology Note  Patient: Jessica Floyd  MRN: [id number]  Primary Care Provider: Jace Valencia MD  Chief Complaint: Macrocytic Anemia, Leukocytosis    Subjective     Subjective:   HPI:   Jessica Floyd is a 74 y.o. 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 She presents today for follow up.     HPI:  Feb 2023: presented to the ED with significant epistaxis  April 2023 :ENT - diagnosed with chronic maxillary sinusitis and nasal polyposis  March 2023: first appointment with Hematology for new onset ERIK of unclear cause   Nov and Dec 2023: admissions for COVID and influenza with subsequent COPD exacerbations   5/14 - 6/3/2024: presented with acute GI bleed   5/14/24: CTA: acute bleed at the level of the cecum - incidental findings: severe plaque burden at bilateral common iliac artery and bilateral common femoral artery   5/15/24: EGD: 2 cm hiatal hernia, otherwise unremarkable; 5/16/24: Colonoscopy: poor prep - clotted blood in entire colon; unable to visualize colon  5/14/24: 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  5/17/24: Given continued pain after the embolization, she underwent exploratory laparotomy with findings of necrotic small bowel in incarcerated left  inguinal hernia s/p small bowel resection and tissue repair of inguinal hernias (through the same segment of bowel that had coils in the vasculature)    Hematology History:  10/2022: first noted to be anemic - Hgb Range: 8.5 - 12 range, MCV: 105, WBC and platelets: wnl (WBC elevated in setting of recent viral illnesses, anemia nadiring after L THR and hospitalizations for COVID, flu, CAP, ER visits for significant epistaxis)  Treatment: IV Iron - (venofer): May 2023 (5/8, 5/15, 5/22, 6/22, 7/6), Sept 2023 (9/19, 9/28, 10/6), May 2024 (5/10/24), August 2024 (8/13/2024)    Interval History:    Ms. Floyd is unaccompanied today. She is able to do most ADLs on her own, can walk without assistance at home but does require a walker for long distances.      (1) Unintentional weight loss: She is overall better after her hospitalization last year for an SBO. However, her main concern today is inability to gain weight. She typically weighs around 130 lbs. Over the last year and half, she has lost about 25 lbs unintentionally, weighing 106 lbs in 3/2025, now stable at 110 lbs. No other GI symptoms or fevers, nightsweats, adenopathy, etc.    (2) Epistaxis: She had a significant episode of epistaxis in 2/2023 (see ENT note on 2/22/23) requiring rhino rocket. Since then, her epistaxis has been mild, last episode on 3/23, lasted a few hours. No other clinical bleeding - denies GI bleeding, hematuria, vaginal bleeding etc. She was previously on PO iron but stopped in 2023 due to constipation     Patient otherwise denies GI sx - no further episodes of bleeding, bowel movements have now normalized, denies fatigue, fevers, chills, night sweats, headaches, chest pain, SOB, cough, abdominal pain, N/V, diarrhea, constipation, rashes.     Review of Systems:  14-point review of systems was asked with the pertinent positives and/or negatives stated in HPI.     Past Medical History:   Nonobstructive CAD, HLD, HTN (HCTZ)/CKD, COPD/PH on TTE  (on 2L home )2 after COVID in 11/2023), HFpEF, SAH (2/2 to ruptured aneurysm in 1999), Right Carotid artery stent for complication (iatrogenic rupture during cerebral angiography), Gout, R Rotator Cuff Tear, GERD,     Past Surgical History:   - Incarcerated inguinal hernia s/p necrotic small bowel resection (5/17/24)  - SAH due to ruptured aneurysm (1999 s/p clipping) c/b right carotid artery dissection requiring a stent, Left femur fracture after mechanical fall s/p L THR (5/2023)    Family History:   - No known family history of cancer     Social History:   - originally from the [place], 3 daughters (1 daughter in [facility], 1 in [place] and 1 in [place]) and 4 granddaughters (Mike)  - previously lived in [facility], moved to [place] in her early 60s  - Smoking History: Former, quit in her 50s, started in her 20s, about 2 cigarettes per day  - Alcohol: socially, red wine   - Illicit Drug Use: denies     Allergies:  Review of patient's allergies indicates:   Allergen Reactions    Hydromorphone Anxiety, Other (See Comments) and Hallucinations     Severe agitation       Medications:  Current Outpatient Medications   Medication Sig Dispense Refill    allopurinoL 200 mg Tab TAKE 300 MG BY MOUTH ONCE DAILY. 90 tablet 3    amLODIPine (NORVASC) 5 MG tablet Take 1 tablet (5 mg total) by mouth once daily.      atorvastatin (LIPITOR) 80 MG tablet TAKE 1 TABLET BY MOUTH EVERY DAY 90 tablet 3    carvediloL (COREG) 6.25 MG tablet Take 1 tablet (6.25 mg total) by mouth 2 (two) times daily. 180 tablet 3    celecoxib (CELEBREX) 200 MG capsule TAKE 1 CAPSULE BY MOUTH DAILY AS NEEDED FOR PAIN. 30 capsule 2    colchicine (COLCRYS) 0.6 mg tablet TAKE 1 TABLET (0.6 MG TOTAL) BY MOUTH ONCE DAILY. AS NEEDED FOR GOUT 90 tablet 3    cyanocobalamin 1,000 mcg/mL injection Inject 1,000 mcg into the muscle every 7 days. 10 mL 5    erythromycin (ROMYCIN) ophthalmic ointment Place into the right eye every evening. Apply 1/2 inch ribbon. 3.5 g 0  "   fluticasone propionate (FLONASE) 50 mcg/actuation nasal spray 2 sprays (100 mcg total) by Each Nostril route once daily.      fluticasone-umeclidin-vilanter (TRELEGY ELLIPTA) 200-62.5-25 mcg inhaler Inhale 1 puff into the lungs once daily. 60 each 11    hydroCHLOROthiazide (HYDRODIURIL) 25 MG tablet Take 1 tablet (25 mg total) by mouth once daily. 90 tablet 3    LIDOcaine 4 % Gel Apply 1 g topically 2 (two) times daily as needed (foot pain).      LIDOcaine-prilocaine (EMLA) cream Apply topically as needed. 30 g 3    mirtazapine (REMERON) 7.5 MG Tab Take 1 tablet (7.5 mg total) by mouth every evening. 90 tablet 1    pantoprazole (PROTONIX) 40 MG tablet Take 1 tablet (40 mg total) by mouth 2 (two) times daily. 180 tablet 3    QUEtiapine (SEROQUEL) 50 MG tablet Take 1 tablet (50 mg total) by mouth 2 (two) times daily. 180 tablet 1    syringe, disposable, 1 mL Syrg 1 Stick by Misc.(Non-Drug; Combo Route) route once a week. 25 each 1     No current facility-administered medications for this visit.     Facility-Administered Medications Ordered in Other Visits   Medication Dose Route Frequency Provider Last Rate Last Admin    mupirocin 2 % ointment   Nasal On Call Procedure Kang Diaz MD   Given at 10/25/23 1045    tranexamic acid (CYKLOKAPRON) 1,000 mg in sodium chloride 0.9 % 100 mL IVPB (MB+)  1,000 mg Intravenous Once Kang Diaz MD        tranexamic acid (CYKLOKAPRON) 1,000 mg in sodium chloride 0.9 % 100 mL IVPB (MB+)  1,000 mg Intravenous Once Kang Diaz MD              Objective:   Vitals:   Vitals:    05/06/25 0919   Weight: 50.1 kg (110 lb 7.2 oz)   Height: 5' 3" (1.6 m)       BMI: Body mass index is 19.57 kg/m².    Physical Exam  ECOG Performance Status:  2  General Appearance:    Alert, cooperative, no distress    Head:    Normocephalic, without obvious abnormality, atraumatic   Throat:   Lips, mucosa, and tongue normal; teeth and gums normal   Neck:   Supple, symmetrical, no adenopathy;     " thyroid:  no enlargement/tenderness/nodules   Lungs:     Clear to auscultation bilaterally, respirations unlabored    Heart:    Regular rate and rhythm, S1 and S2 normal   Abdomen:     Soft, non-tender, bowel sounds active, no masses, no organomegaly   Extremities:   Extremities normal, atraumatic, no cyanosis or edema   Pulses:   2+ and symmetric all extremities   Skin:   Skin color, texture, turgor normal, no rashes or lesions   Lymph nodes:   Cervical, supraclavicular, and axillary nodes normal   Neurologic:   Generalized weaknes, normal sensation     Laboratory Data:  No visits with results within 1 Week(s) from this visit.   Latest known visit with results is:   Lab Visit on 04/29/2025   Component Date Value Ref Range Status    Sodium 04/29/2025 137  136 - 145 mmol/L Final    Potassium 04/29/2025 4.0  3.5 - 5.1 mmol/L Final    Chloride 04/29/2025 104  95 - 110 mmol/L Final    CO2 04/29/2025 25  23 - 29 mmol/L Final    Glucose 04/29/2025 95  70 - 110 mg/dL Final    BUN 04/29/2025 23  8 - 23 mg/dL Final    Creatinine 04/29/2025 0.8  0.5 - 1.4 mg/dL Final    Calcium 04/29/2025 9.0  8.7 - 10.5 mg/dL Final    Protein Total 04/29/2025 7.8  6.0 - 8.4 gm/dL Final    Albumin 04/29/2025 3.3 (L)  3.5 - 5.2 g/dL Final    Bilirubin Total 04/29/2025 0.8  0.1 - 1.0 mg/dL Final    For infants and newborns, interpretation of results should be based   on gestational age, weight and in agreement with clinical   observations.    Premature Infant recommended reference ranges:   0-24 hours:  <8.0 mg/dL   24-48 hours: <12.0 mg/dL   3-5 days:    <15.0 mg/dL   6-29 days:   <15.0 mg/dL    ALP 04/29/2025 92  40 - 150 unit/L Final    AST 04/29/2025 31  11 - 45 unit/L Final    ALT 04/29/2025 25  10 - 44 unit/L Final    Anion Gap 04/29/2025 8  8 - 16 mmol/L Final    eGFR 04/29/2025 >60  >60 mL/min/1.73/m2 Final    Estimated GFR calculated using the CKD-EPI creatinine (2021) equation.    PATHOLOGIST REVIEW - CBC/DIFF ([facility]) 04/29/2025     Final                    Value:Examination of the peripheral blood smear reveal mild macrocytic erythrocytes. No target cells or schistocytes are seen. Leukocytosis with increased eosinophils and left shift is present. No hypersegmented neutrophils are seen. Platelets are present in normal numbers and demonstrate a normal morphology.       Interpreting Pathologist: Marianela Centeno MD        Flow Cytometry Interpretation 04/29/2025    Final                    Value:PERIPHERAL BLOOD, FLOW CYTOMETRY:  Mild relative monocytosis (see COMMENT)  No monotypic B-cell population   No aberrant T-cell antigen expression  No circulating blasts    COMMENT: Review of four (4) corresponding peripheral blood smears reveals leukocytosis with a very rare subset of dysplastic hypogranulated and/or hyposegmented neutrophils (<1%) and relative and absolute monocytosis with predominantly mature monocytes.    While this assay is not validated for monocyte re-partition analysis, the predominant majority of monocytes appear to represent CD14+/CD16(-) classical monocytes (albeit <94% of monocytes) with scant populations of CD14(-)/CD16+ non-classical monocytes (<1%) and CD14(dim)/CD16+ intermediate monocytes (<1%).     Due to the persistence of this patient's monocytosis, clinical correlation and close clinical surveillance of this patient are recommended.    Please correlate the immunophenotyping results with clinical findings for final diagnosis.    Interpreting                           Pathologist: Charla Odom DO      Flow Cytometry Comment 04/29/2025    Final                    Value:Clinical History: 74 year old woman with h/o chronic mild macrocytic anemia and persistent and progressive leukocytosis with relative and absolute monocytosis    Flow Differential: Lymphocyte gate 8.3%, Monocyte gate 12.7%, Granulocyte gate 65.2%, Blast gate 0%. Debris/nRBC gate 1.7%, Viability 99.2%.      Antibodies Analyzed 04/29/2025    Final                     Value:  7-AAD    CD10  CD13  CD19  CD20 CD2  CD34  CD3  CD45  CD4  CD5 CD7  CD8  [place]  [facility]         Disclaimer Statement 04/29/2025    Final                    Value:This test was developed and its performance characteristics determined by the [facility] Department of Pathology and Laboratory Medicine. It has not been cleared or approved by the U.S. Food and Drug Administration. The FDA has determined that such clearance or approval is not necessary. This test is used for clinical purposes. It should not be regarded as investigational or for research. This laboratory is certified under CLIA-88 as qualified to perform high complexity clinical laboratory testing.       CRP 04/29/2025 1.6  <=8.2 mg/L Final    Sed Rate 04/29/2025 66 (H)  <=36 mm/hr Final    Methylmalonic Acid 04/29/2025 0.46 (H)  <0.40 umol/L Final    If applicable, any drug confirmation testing reported  here was developed and the performance characteristics  determined by [facility]. This   confirmation testing has not been cleared or approved  by the FDA. The laboratory is regulated under CLIA as  qualified to perform high-complexity testing. This test  is used for patient testing purposes. It should not be  regarded as investigational or for research.     Test performed at [facility],  [address] [place]  [address] [phone]  Wendi Laguerre MD, PhD - Medical Director    Ferritin 04/29/2025 55.0  20.0 - 300.0 ng/mL Final    Iron Level 04/29/2025 60  30 - 160 ug/dL Final    Transferrin 04/29/2025 310  200 - 375 mg/dL Final    Iron Binding Capacity Total 04/29/2025 459 (H)  250 - 450 ug/dL Final    Iron Saturation 04/29/2025 13 (L)  20 - 50 % Final    Soluble Transferrin Receptor (sTfR) 04/29/2025 7.1 (H)  1.8 - 4.6 mg/L Final       -------------------ADDITIONAL INFORMATION-------------------  It is reported that    Americans may   have slightly   higher values.     Test  Performed by:  [address] [address]  : Raina Isaac Ph.D.; CLIA# [id number]    WBC 04/29/2025 15.86 (H)  3.90 - 12.70 K/uL Final    RBC 04/29/2025 3.60 (L)  4.00 - 5.40 M/uL Final    HGB 04/29/2025 11.8 (L)  12.0 - 16.0 gm/dL Final    HCT 04/29/2025 37.7  37.0 - 48.5 % Final    MCV 04/29/2025 105 (H)  82 - 98 fL Final    MCH 04/29/2025 32.8 (H)  27.0 - 31.0 pg Final    MCHC 04/29/2025 31.3 (L)  32.0 - 36.0 g/dL Final    RDW 04/29/2025 17.5 (H)  11.5 - 14.5 % Final    Platelet Count 04/29/2025 390  150 - 450 K/uL Final    MPV 04/29/2025 9.9  9.2 - 12.9 fL Final    Nucleated RBC 04/29/2025 0  <=0 /100 WBC Final    Neut % 04/29/2025 59.6  38 - 73 % Final    Lymph % 04/29/2025 13.7 (L)  18 - 48 % Final    Mono % 04/29/2025 16.5 (H)  4 - 15 % Final    Eos % 04/29/2025 7.6  <=8 % Final    Basophil % 04/29/2025 1.6  <=1.9 % Final    Imm Grans % 04/29/2025 1.0 (H)  0.0 - 0.5 % Final    Neut # 04/29/2025 9.46 (H)  1.8 - 7.7 K/uL Final    Lymph # 04/29/2025 2.17  1 - 4.8 K/uL Final    Mono # 04/29/2025 2.61 (H)  0.3 - 1 K/uL Final    Eos # 04/29/2025 1.21 (H)  <=0.5 K/uL Final    Baso # 04/29/2025 0.25 (H)  <=0.2 K/uL Final    Imm Grans # 04/29/2025 0.16 (H)  0.00 - 0.04 K/uL Final    Mild elevation in immature granulocytes is non specific and can be seen in a variety of conditions including stress response, acute inflammation, trauma and pregnancy. Correlation with other laboratory and clinical findings is essential.    Platelet Estimate 04/29/2025 Appears Normal    Final    Anisocytosis 04/29/2025 Slight   Final    Hypochromia 04/29/2025 Occasional   Final    Toxic Gran 04/29/2025 Present   Final     Labs:   March 2024:   - WBC: 14, Hgb: 10.3, MCV: 110, platelets: 387, WBC: 14 (ANC: 8500), TSAT: 10%, Ferritin: 24 (March 2023) to 49 (8/2023), 611 (12/2023), now 69,  folate : 14, B12: 586, MMA: 1.72, retic: 7.6, smear: unremarkable  (moderate anisopoikilocytosis, leukocytosis w/ reactive changes), Cr: 0.8, GFR: 60, LFTs: wnl, KF.5, LFLC: 3.99, Ratio: wnl, HIV and hepatitis testing: negative, haptoglobin: 264 (2023), retic: 4 (2023)     2024:   - WBC: 13, Hgb: 9.8, MCV: 100, platelets: 302, monocytes: 23% (3100),CMP: unremarkable - Cr: 1.3, GFR: 43, TSAT: 4%, STFR: 7.4, Ferritn: 135, B12: 1830, MMA: 0.46 (1.72), copper: 1445, zinc: 40, SPEP/RADHA: wnl, k flc; 9.68, L FLC: 10,67, Ratio: 0.92    2025:   - WBC: 16 (22), ANC: 10,000, monocytes; 3000, eosinphils: 700, basophils; 280, Hgb: 12, MCV: 105, platelets: 386  - CMP: Cr: 0.9, GFR: 40-60, K: 3.4  - Ferritin: 229, TSAT: 22%, Folate: 7  - KF, LF, Ratio: 1.12; [] spep/radha    2025:  - CBC: WBC: 15, Hgb: 11.8, , platelets: 390   - ANC: 9460, Monocytes: 2610, eosinophils: 1210, basophils: 250   - Flow cytometry (PB): mild relative monocytosis - no monotypic B cell population or aberrant T cell antigen expression  - Smear: very rare subset of dysplastic hypogranulated and/or hyposegmented neutrophils (<1%) and relative and absolute monocytosis with predominantly mature monocytes, mild macrocytic RBCs, increased eosinophils   - CMP: Cr: 0.8, GFR: 60, LFTs: wnl   - ESR: 66 (46), CRP: 1.6   - STFR: 7.1 , TSAT: 13%, ferritin: 55   - MMA: 0.46     Imagin24: CTA: acute bleed at the level of the cecum - incidental findings: severe plaque burden at bilateral common iliac artery and bilateral common femoral artery     24: CT chest w/o contrast: COPD, scattered pulmonary nodules up to 7 mm in BJORN (exam limited by motion)    10/31/2023: CT chest- small new cluster of RLL micronodules, likely infectious/inflammatory     2023: CTA chest - evaluation limited due to motion artifact, patchy nonspecific GGOs involving lingula, RML and right lung base, may reflect infectious/inflammatory change     Endoscopies:   5/15/24: EGD: 2 cm hiatal hernia, otherwise  unremarkable     5/16/24: Colonoscopy: poor prep   - clotted blood in entire colon; unable to visualize colon    Assessment   Assessment and Plan:   Jessica Floyd is a 74 y.o. female with nonobstructive CAD, HTN/CKD3, COPD (on intermittent home O2), SAH (2/2 to ruptured aneurysm in 1999), Right carotid artery stent (iatrogenic rupture during cerebral angiography, on aspirin), HFpEF (lasix), incarcerated inguinal hernia s/p necrotic small bowel resection (5/17/24). She is followed by Hematology for a 2 year history of macrocytic anemia, first identified in 10/2022 with prior evidence of iron deficiency and B12 deficiency; and leukocytosis. She presents today for follow up.     April 2025 Labs:   - CBC: WBC: 15 (16), Hgb: 11.8, (12), , platelets: 390   - ANC: 9460, Monocytes: 2610 (3000), eosinophils: 1210 (700), basophils: 250   - Flow cytometry (PB): mild relative monocytosis - no monotypic B cell population or aberrant T cell antigen expression  - Smear: very rare subset of dysplastic hypogranulated and/or hyposegmented neutrophils (<1%) and relative and absolute monocytosis with predominantly mature monocytes, mild macrocytic RBCs, increased eosinophils   - CMP: Cr: 0.8, GFR: 60 (GFR: 40-60), LFTs: wnl   - ESR: 66 (46), CRP: 1.6   - STFR: 7.1 , TSAT: 13% (22%), ferritin: 55 (229)  - MMA: 0.46    # Leukocytosis  # Neutrophilia  # Monocytosis  # Eosinophilia  - WBC intermittently elevated since 2023, predominantly neutrophilia; smear 10/2024 with leukocytosis w/ left shift, no blasts; suggestive of an inflammatory process  - Given persistent monocytosis/eosinophilia, repeat smear and flow cytometry performed and overall unrevealing (relative monocytosis with mature monocytes, increased eosinophils).  - Reviewed role of bone marrow biopsy, pt elects to CTM with labs in 2 months. Return precautions reviewed.     # Macrocytic Anemia, improving  # Vitamin B12 Deficiency    # Iron Deficiency    - Causes of Mrs.  Everett's anemia is likely multifactorial with a component of vitamin b12 deficiency (MMA: 1.72, MCV >100; anemia improving after starting IM B12 in May 2024), prior iron deficiency (TSAT: 4%, ferritin previously <40, last received IV iron in August 2024), anemia of chronic disease/inflammation (her anemia nadirs during COVID, influenza infections, hospitalizations etc), anemia of renal disease (GFR at one point of 43), and possible early MDS vs. other bone marrow process based on age/elevated MCV/leukocytosis above.   - To continue to follow with GI to discuss repeating colonoscopy (5/2024 non- diagnostic) for prior iron deficiency - messaged Ester Hernandez     # Unintentional Weight Loss  - Unclear cause but started after small bowel resection last year; CT chest in 10/2024 and CT A/P in July 2024 without overt malignancy  - Weight now stable  - Encouraged her to follow up with PCP and GI for further assessment    # Other Co-Morbidities  - B12 deficiency: IF abs negative, MMA: 0.46 from 1.72 after starting IM B12 in May 2024 (1.72 in 3/2024 prior to small bowel resection), continue IM B12 monthly, per PCP   - Elevated FLCs: normal ratio, no monoclonal protein on spep/miroslava suggestive of inflammation, GFR upper 50s on occasional (which would make FLC in normal range for her level of renal function), CTM  - Incarcerated inguinal hernia s/p necrotic small bowel resection (5/17/24) and GIB (embolization at level of cecum): encouraged GI and surgery follow up   - COPD/Pulmonary nodules: noted on CT chest on 5/17/24 and 10/2024 (few stable 6 mm pulmonary nodules), given smoking hx, consider repeat CT chest in 6 months, per PCP and pulmonary  - PAD: severe plaque burden at bilateral common iliac artery and bilateral common femoral artery noted on CTA in May 2024, per PCP  - Epistaxis (2/2023, presumably from dry nasal mucosa, requiring rhino rocket): per ENT  - Nonobstructive CAD, HTN (HCTZ)/CKD3, COPD/PH on TTE, SAH  (2/2 to ruptured aneurysm in ), Right Carotid artery stent for complication (iatrogenic rupture during cerebral angiography), HFpEF (lasix): stable on current regimen, per PCP  - Cancer Screening: Colonoscopy:  (reportedly wnl) - aborted in 2024 due to clotted blood in entire colon/poor prep, Pap: 2018 (Pap: wnl, HPV: neg), Lung cancer screening: last 10/2024, MM2022: wnl - per PCP    To do:  - RTC in 2 months with labs prior - CBC, CMP, Peripheral Smear, Ferritin, Iron studies, Soluble Transferrin Receptor  - PCP (IM B12, pulmonary nodules, weight loss), GI (scopes), Pulmonology (micronodules)      Med Onc Chart Routing  Urgent    Follow up with physician . RTC in end 2025 [facility] with labs ordered during today's encounter on Friday prior to appointment at [facility]   Follow up with [facility]    Infusion scheduling note    Injection scheduling note    Labs    Imaging    Pharmacy appointment    Other referrals                MDM includes:    - Acute or chronic illness or injury that poses a threat to life or bodily function  - Review of prior external notes from unique source  - Independent review and explanation of 3+ results from unique tests   - Discussion of management and ordering 3+ unique tests   - Extensive discussion of treatment and management  - Prescription drug management  - Drug therapy requiring intensive monitoring for toxicity    - Overall, I discussed the diagnosis, history, stage, labs/imaging, prognosis, management, and treatment plan as applicable. I reviewed adverse short and long term effects as applicable.   - Informed patient if symptoms are getting worse that it is their responsibility to call the clinic and schedule follow up sooner than stated follow up. Also informed patient if they do not hear from the appointment center in 2-5 business days for their referrals, the patient must call the Oncology clinic so we can follow up on procedures or referral scheduling. Patient  was fully informed of current medical plan, all questions were answered and patient verbalized understanding. No further questions.   - Face to Face Visit time I spent with the patient: 60 minutes of total time spent on the encounter, including counseling patient and/or coordinating care, which includes face to face time and non-face to face time preparing to see the patient (eg, review of tests), Obtaining and/or reviewing separately obtained history, Documenting clinical information in the electronic or other health record, Independently interpreting results (not separately reported) and communicating results to the patient/family/caregiver, or Care coordination (not separately reported).     Signed   Agata Rolon MD  [facility]

## 2025-05-06 NOTE — Clinical Note
Kishan Norman I saw this pt back in heme clinic for her macrocytic anemia, previous evidence of iron deficiency. Her most recent ferritin is 55 but dropping from 200, elevated stfr, suggesting ongoing iron deficiency. I know she had difficult scopes back in 5/2024 - she's been having issues w/ unintentional weight loss, stable but cannot gain the weight she lost  Wanted to reestablish her w/ you to see if repeating scopes or capsule endoscopy is needed or if we should just watch since hemoglobin is relatively stable for now  Thanks!

## 2025-05-07 DIAGNOSIS — D47.1 CHRONIC MYELOPROLIFERATIVE DISEASE: ICD-10-CM

## 2025-05-07 DIAGNOSIS — D72.829 LEUKOCYTOSIS, UNSPECIFIED TYPE: Primary | ICD-10-CM

## 2025-05-12 ENCOUNTER — PATIENT MESSAGE (OUTPATIENT)
Dept: INTERNAL MEDICINE | Facility: CLINIC | Age: 75
End: 2025-05-12
Payer: MEDICARE

## 2025-05-12 DIAGNOSIS — R26.89 BALANCE PROBLEM: ICD-10-CM

## 2025-05-12 DIAGNOSIS — R53.81 PHYSICAL DECONDITIONING: ICD-10-CM

## 2025-05-12 DIAGNOSIS — Z74.09 IMPAIRED MOBILITY: Primary | ICD-10-CM

## 2025-05-13 ENCOUNTER — TELEPHONE (OUTPATIENT)
Dept: GASTROENTEROLOGY | Facility: HOSPITAL | Age: 75
End: 2025-05-13
Payer: MEDICARE

## 2025-05-13 ENCOUNTER — PATIENT MESSAGE (OUTPATIENT)
Dept: HEMATOLOGY/ONCOLOGY | Facility: CLINIC | Age: 75
End: 2025-05-13
Payer: MEDICARE

## 2025-05-13 DIAGNOSIS — D53.9 MACROCYTIC ANEMIA: Primary | ICD-10-CM

## 2025-05-13 DIAGNOSIS — R79.1 ABNORMAL COAGULATION PROFILE: ICD-10-CM

## 2025-05-13 NOTE — TELEPHONE ENCOUNTER
LOV with Jace Valencia MD , 4/24/2025    Patient requesting referral to PT for balance and stamina  Order pended if appropriate diagnosis not complete

## 2025-05-13 NOTE — PROGRESS NOTES
Team,   Based on my conversation with GI, please coordinate fecal occult stool testing.     I follow her in heme clinic for for her macrocytic anemia, previous evidence of iron deficiency. Her most recent ferritin is 55 but dropping from 200, elevated stfr, suggesting ongoing iron deficiency. I know she had difficult scopes back in 5/2024 - she's been having issues w/ unintentional weight loss, stable but cannot gain the weight she lost. Will  reestablish her w/ GI to discuss repeating scopes or capsule endoscopy is needed or if we should just watch since hemoglobin is relatively stable for now     Thanks!

## 2025-05-13 NOTE — TELEPHONE ENCOUNTER
----- Message from Agata Rolon MD sent at 5/13/2025  8:40 AM CDT -----  Regarding: RE: Can you review?  Kishan Hall!Thanks for your help, and sorry for the delay!I think she would really appreciate talking to you about pros and cons of repeat scopes. I'll ask my team to arrange the fecal occult in the meantime.- Nini  ----- Message -----  From: Rafael Preston MD  Sent: 5/9/2025   4:41 PM CDT  To: Ester Jimenez NP; Agata Rolon MD  Subject: RE: Can you review?                              Sorry, adding dr rolon to response... This is quite the complex caseBack in 2024, she had GI bleeding, apparently IR embolization of a branch near the cecum for bleeding.Dr lowry attempted a colonoscopy for over 1 hour without success at reaching the cecum , she was then taken to OR for ex lap for takedown adhesions and small bowel obstruction / resection for necrotic small bowel incarcerated in inguinal hernia .She would perhaps need to me with one of us in clinic to discuss future colonoscopy, as she seemed to have significant dificulty with that.Another approach would be to test her for occult blood in stool, and if positive , then decide.If yall would like I can book into my office for a visit ?  ----- Message -----  From: Ester Jimenez NP  Sent: 5/6/2025   5:50 PM CDT  To: Rafael Preston MD  Subject: Can you review?                                  Attempt to repeat scopes with biopsy for h.pylori and celiac, VCE, imaging with/wo contrast, celiac and h.pylori labs or monitor??  ----- Message -----  From: Agata Rolon MD  Sent: 5/6/2025  10:54 AM CDT  To: PAXTON Presley  I saw this pt back in heme clinic for her macrocytic anemia, previous evidence of iron deficiency. Her most recent ferritin is 55 but dropping from 200, elevated stfr, suggesting ongoing iron deficiency. I know she had difficult scopes back in 5/2024 - she's been having issues w/ unintentional weight loss, stable  but cannot gain the weight she lost    Wanted to reestablish her w/ you to see if repeating scopes or capsule endoscopy is needed or if we should just watch since hemoglobin is relatively stable for now    Thanks!

## 2025-05-15 ENCOUNTER — TELEPHONE (OUTPATIENT)
Dept: HEMATOLOGY/ONCOLOGY | Facility: CLINIC | Age: 75
End: 2025-05-15
Payer: MEDICARE

## 2025-05-15 NOTE — TELEPHONE ENCOUNTER
Called spoke with pt .     Stool collection kit at  on the 3rd floor.  to .      called to say that pt had bm today in a diaper and wanted to submit sample.   Advised pt that unfortunately we need a clean sample.     Educated pt  on appropriate collection of stool.      verbalized understanding.     ----- Message from Spring sent at 5/15/2025  2:47 PM CDT -----  Type:  Needs Medical AdviceWho Called: Sandie Purvis (please be specific):  How long has patient had these symptoms:  Pharmacy name and phone #:  Would the patient rather a call back or a response via [facility]? Call Best Call Back Number:  [phone] Information: Have a stool sample

## 2025-05-16 ENCOUNTER — TELEPHONE (OUTPATIENT)
Dept: HEMATOLOGY/ONCOLOGY | Facility: CLINIC | Age: 75
End: 2025-05-16
Payer: MEDICARE

## 2025-05-16 NOTE — TELEPHONE ENCOUNTER
Called patients  as requested vis [employer] message. He answered. I gave him directions on where to drop off the stool sample. He said he picked up the kit and will bring it as soon as it is collected. Patient verbalized understanding of where to bring the sample and had no further questions or concerns.

## 2025-05-17 ENCOUNTER — LAB VISIT (OUTPATIENT)
Dept: LAB | Facility: HOSPITAL | Age: 75
End: 2025-05-17
Payer: MEDICARE

## 2025-05-17 DIAGNOSIS — R79.1 ABNORMAL COAGULATION PROFILE: ICD-10-CM

## 2025-05-17 DIAGNOSIS — D53.9 MACROCYTIC ANEMIA: ICD-10-CM

## 2025-05-17 LAB — OB PNL STL: NEGATIVE

## 2025-05-17 PROCEDURE — 82272 OCCULT BLD FECES 1-3 TESTS: CPT | Mod: TXP

## 2025-05-19 ENCOUNTER — LAB VISIT (OUTPATIENT)
Dept: LAB | Facility: OTHER | Age: 75
End: 2025-05-19
Attending: INTERNAL MEDICINE
Payer: MEDICARE

## 2025-05-19 DIAGNOSIS — D53.9 MACROCYTIC ANEMIA: ICD-10-CM

## 2025-05-19 DIAGNOSIS — D72.829 LEUKOCYTOSIS, UNSPECIFIED TYPE: ICD-10-CM

## 2025-05-19 DIAGNOSIS — D47.1 CHRONIC MYELOPROLIFERATIVE DISEASE: ICD-10-CM

## 2025-05-19 LAB
ABSOLUTE EOSINOPHIL (OHS): 0.19 K/UL
ABSOLUTE MONOCYTE (OHS): 1.69 K/UL (ref 0.3–1)
ABSOLUTE NEUTROPHIL COUNT (OHS): 5.84 K/UL (ref 1.8–7.7)
ALBUMIN SERPL BCP-MCNC: 2.6 G/DL (ref 3.5–5.2)
ALP SERPL-CCNC: 91 UNIT/L (ref 40–150)
ALT SERPL W/O P-5'-P-CCNC: 38 UNIT/L (ref 10–44)
ANION GAP (OHS): 9 MMOL/L (ref 8–16)
AST SERPL-CCNC: 39 UNIT/L (ref 11–45)
BASOPHILS # BLD AUTO: 0.16 K/UL
BASOPHILS NFR BLD AUTO: 1.7 %
BILIRUB SERPL-MCNC: 0.4 MG/DL (ref 0.1–1)
BUN SERPL-MCNC: 22 MG/DL (ref 8–23)
CALCIUM SERPL-MCNC: 8.7 MG/DL (ref 8.7–10.5)
CHLORIDE SERPL-SCNC: 102 MMOL/L (ref 95–110)
CO2 SERPL-SCNC: 25 MMOL/L (ref 23–29)
CREAT SERPL-MCNC: 1 MG/DL (ref 0.5–1.4)
ERYTHROCYTE [DISTWIDTH] IN BLOOD BY AUTOMATED COUNT: 17.8 % (ref 11.5–14.5)
FERRITIN SERPL-MCNC: 127 NG/ML (ref 20–300)
GFR SERPLBLD CREATININE-BSD FMLA CKD-EPI: 59 ML/MIN/1.73/M2
GLUCOSE SERPL-MCNC: 98 MG/DL (ref 70–110)
HCT VFR BLD AUTO: 32.7 % (ref 37–48.5)
HGB BLD-MCNC: 10.2 GM/DL (ref 12–16)
IMM GRANULOCYTES # BLD AUTO: 0.12 K/UL (ref 0–0.04)
IMM GRANULOCYTES NFR BLD AUTO: 1.3 % (ref 0–0.5)
IRON SATN MFR SERPL: 7 % (ref 20–50)
IRON SERPL-MCNC: 24 UG/DL (ref 30–160)
LYMPHOCYTES # BLD AUTO: 1.54 K/UL (ref 1–4.8)
MCH RBC QN AUTO: 31.5 PG (ref 27–31)
MCHC RBC AUTO-ENTMCNC: 31.2 G/DL (ref 32–36)
MCV RBC AUTO: 101 FL (ref 82–98)
NUCLEATED RBC (/100WBC) (OHS): 0 /100 WBC
PATHOLOGIST REVIEW - CBC/DIFF (OHS): NORMAL
PLATELET # BLD AUTO: 400 K/UL (ref 150–450)
PMV BLD AUTO: 10.9 FL (ref 9.2–12.9)
POTASSIUM SERPL-SCNC: 4.1 MMOL/L (ref 3.5–5.1)
PROT SERPL-MCNC: 7.4 GM/DL (ref 6–8.4)
RBC # BLD AUTO: 3.24 M/UL (ref 4–5.4)
RELATIVE EOSINOPHIL (OHS): 2 %
RELATIVE LYMPHOCYTE (OHS): 16.1 % (ref 18–48)
RELATIVE MONOCYTE (OHS): 17.7 % (ref 4–15)
RELATIVE NEUTROPHIL (OHS): 61.2 % (ref 38–73)
SODIUM SERPL-SCNC: 136 MMOL/L (ref 136–145)
TIBC SERPL-MCNC: 329 UG/DL (ref 250–450)
TRANSFERRIN SERPL-MCNC: 222 MG/DL (ref 200–375)
WBC # BLD AUTO: 9.54 K/UL (ref 3.9–12.7)

## 2025-05-19 PROCEDURE — 83540 ASSAY OF IRON: CPT | Mod: TXP

## 2025-05-19 PROCEDURE — 36415 COLL VENOUS BLD VENIPUNCTURE: CPT | Mod: TXP

## 2025-05-19 PROCEDURE — 85025 COMPLETE CBC W/AUTO DIFF WBC: CPT | Mod: TXP

## 2025-05-19 PROCEDURE — 84238 ASSAY NONENDOCRINE RECEPTOR: CPT | Mod: TXP

## 2025-05-19 PROCEDURE — 82728 ASSAY OF FERRITIN: CPT | Mod: TXP

## 2025-05-19 PROCEDURE — 80053 COMPREHEN METABOLIC PANEL: CPT | Mod: TXP

## 2025-05-19 PROCEDURE — 81208 BCR/ABL1 GENE OTHER BP: CPT | Mod: TXP

## 2025-05-21 ENCOUNTER — RESULTS FOLLOW-UP (OUTPATIENT)
Dept: HEMATOLOGY/ONCOLOGY | Facility: CLINIC | Age: 75
End: 2025-05-21

## 2025-05-21 LAB — STFR SERPL-MCNC: 6.1 MG/L (ref 1.8–4.6)

## 2025-05-22 LAB
GENETICIST REVIEW: NORMAL
M BCR/ABL1 REFLEX RESULT: NORMAL
SPECIMEN SOURCE: NORMAL

## 2025-05-27 ENCOUNTER — TELEPHONE (OUTPATIENT)
Dept: TRANSPLANT | Facility: CLINIC | Age: 75
End: 2025-05-27
Payer: MEDICARE

## 2025-05-27 ENCOUNTER — TELEPHONE (OUTPATIENT)
Dept: GASTROENTEROLOGY | Facility: CLINIC | Age: 75
End: 2025-05-27
Payer: MEDICARE

## 2025-05-27 ENCOUNTER — OFFICE VISIT (OUTPATIENT)
Dept: INTERNAL MEDICINE | Facility: CLINIC | Age: 75
End: 2025-05-27
Payer: MEDICARE

## 2025-05-27 ENCOUNTER — HOSPITAL ENCOUNTER (INPATIENT)
Facility: HOSPITAL | Age: 75
LOS: 4 days | Discharge: HOME | DRG: 378 | End: 2025-05-31
Attending: STUDENT IN AN ORGANIZED HEALTH CARE EDUCATION/TRAINING PROGRAM | Admitting: HOSPITALIST
Payer: MEDICARE

## 2025-05-27 VITALS — SYSTOLIC BLOOD PRESSURE: 112 MMHG | DIASTOLIC BLOOD PRESSURE: 52 MMHG

## 2025-05-27 DIAGNOSIS — R07.9 CHEST PAIN: ICD-10-CM

## 2025-05-27 DIAGNOSIS — Z13.6 SCREENING FOR CARDIOVASCULAR CONDITION: ICD-10-CM

## 2025-05-27 DIAGNOSIS — K92.1 MELENA: Primary | ICD-10-CM

## 2025-05-27 DIAGNOSIS — I74.09 AORTOILIAC OCCLUSIVE DISEASE: ICD-10-CM

## 2025-05-27 DIAGNOSIS — K92.2 ACUTE GI BLEEDING: ICD-10-CM

## 2025-05-27 DIAGNOSIS — D64.9 ANEMIA, UNSPECIFIED TYPE: ICD-10-CM

## 2025-05-27 DIAGNOSIS — M79.606 LEG PAIN: ICD-10-CM

## 2025-05-27 DIAGNOSIS — I73.9 PAD (PERIPHERAL ARTERY DISEASE): ICD-10-CM

## 2025-05-27 DIAGNOSIS — S81.802A WOUND OF LEFT LOWER EXTREMITY, INITIAL ENCOUNTER: ICD-10-CM

## 2025-05-27 DIAGNOSIS — R20.0 LEFT LEG NUMBNESS: ICD-10-CM

## 2025-05-27 DIAGNOSIS — N30.00 ACUTE CYSTITIS WITHOUT HEMATURIA: ICD-10-CM

## 2025-05-27 DIAGNOSIS — D50.8 OTHER IRON DEFICIENCY ANEMIA: Primary | ICD-10-CM

## 2025-05-27 DIAGNOSIS — I95.9 HYPOTENSION, UNSPECIFIED HYPOTENSION TYPE: Primary | ICD-10-CM

## 2025-05-27 DIAGNOSIS — R06.02 SHORTNESS OF BREATH: ICD-10-CM

## 2025-05-27 DIAGNOSIS — R19.7 DIARRHEA, UNSPECIFIED TYPE: ICD-10-CM

## 2025-05-27 DIAGNOSIS — S91.009A ANKLE WOUND: ICD-10-CM

## 2025-05-27 LAB
ABSOLUTE NEUTROPHIL MANUAL (OHS): 10.5 K/UL
ALBUMIN SERPL BCP-MCNC: 2.3 G/DL (ref 3.5–5.2)
ALLENS TEST: NORMAL
ALP SERPL-CCNC: 64 UNIT/L (ref 40–150)
ALT SERPL W/O P-5'-P-CCNC: 16 UNIT/L (ref 10–44)
ANION GAP (OHS): 16 MMOL/L (ref 8–16)
ANISOCYTOSIS BLD QL SMEAR: SLIGHT
AST SERPL-CCNC: 23 UNIT/L (ref 11–45)
BACTERIA #/AREA URNS AUTO: ABNORMAL /HPF
BILIRUB SERPL-MCNC: 0.3 MG/DL (ref 0.1–1)
BILIRUB UR QL STRIP.AUTO: NEGATIVE
BIPAP: 0
BNP SERPL-MCNC: 243 PG/ML (ref 0–99)
BUN SERPL-MCNC: 39 MG/DL (ref 8–23)
CALCIUM SERPL-MCNC: 7.5 MG/DL (ref 8.7–10.5)
CHLORIDE SERPL-SCNC: 103 MMOL/L (ref 95–110)
CLARITY UR: CLEAR
CO2 SERPL-SCNC: 20 MMOL/L (ref 23–29)
COLOR UR AUTO: YELLOW
CORRECTED TEMPERATURE (PCO2): 42.5 MMHG
CORRECTED TEMPERATURE (PH): 7.4
CORRECTED TEMPERATURE (PO2): 18.7 MMHG
CREAT SERPL-MCNC: 0.9 MG/DL (ref 0.5–1.4)
ERYTHROCYTE [DISTWIDTH] IN BLOOD BY AUTOMATED COUNT: 17.9 % (ref 11.5–14.5)
FIO2: 21 %
FOLATE SERPL-MCNC: 9.6 NG/ML (ref 4–24)
GFR SERPLBLD CREATININE-BSD FMLA CKD-EPI: >60 ML/MIN/1.73/M2
GLUCOSE SERPL-MCNC: 108 MG/DL (ref 70–110)
GLUCOSE UR QL STRIP: NEGATIVE
HCT VFR BLD AUTO: 16.4 % (ref 37–48.5)
HCT VFR BLD CALC: 16.5 % (ref 36–54)
HGB BLD-MCNC: 5.2 GM/DL (ref 12–16)
HGB UR QL STRIP: ABNORMAL
HYPOCHROMIA BLD QL SMEAR: ABNORMAL
KETONES UR QL STRIP: NEGATIVE
LDH SERPL L TO P-CCNC: 0.7 MMOL/L (ref 0.5–2.2)
LDH SERPL L TO P-CCNC: 1.1 MMOL/L (ref 0.5–2.2)
LEUKOCYTE ESTERASE UR QL STRIP: ABNORMAL
LYMPHOCYTES NFR BLD MANUAL: 11 % (ref 18–48)
MAGNESIUM SERPL-MCNC: 1.2 MG/DL (ref 1.6–2.6)
MCH RBC QN AUTO: 31.1 PG (ref 27–31)
MCHC RBC AUTO-ENTMCNC: 31.7 G/DL (ref 32–36)
MCV RBC AUTO: 98 FL (ref 82–98)
MICROSCOPIC COMMENT: ABNORMAL
MONOCYTES NFR BLD MANUAL: 6 % (ref 4–15)
NEUTROPHILS NFR BLD MANUAL: 83 % (ref 38–73)
NITRITE UR QL STRIP: NEGATIVE
NUCLEATED RBC (/100WBC) (OHS): 0 /100 WBC
OVALOCYTES BLD QL SMEAR: ABNORMAL
PCO2 BLDA: 42.5 MMHG (ref 35–45)
PH SMN: 7.4 [PH] (ref 7.35–7.45)
PH UR STRIP: 6 [PH]
PLATELET # BLD AUTO: 438 K/UL (ref 150–450)
PLATELET BLD QL SMEAR: ABNORMAL
PMV BLD AUTO: 10.5 FL (ref 9.2–12.9)
PO2 BLDA: 18.7 MMHG (ref 40–60)
POC BASE DEFICIT: 1.1 MMOL/L
POC HCO3: 24.7 MMOL/L (ref 24–28)
POC IONIZED CALCIUM: 1.08 MMOL/L (ref 1.06–1.42)
POC PERFORMED BY: ABNORMAL
POC SATURATED O2: 20.4 %
POC TEMPERATURE: 37 C
POIKILOCYTOSIS BLD QL SMEAR: SLIGHT
POLYCHROMASIA BLD QL SMEAR: ABNORMAL
POTASSIUM BLD-SCNC: 2.7 MMOL/L (ref 3.5–5.1)
POTASSIUM SERPL-SCNC: 2.8 MMOL/L (ref 3.5–5.1)
PROT SERPL-MCNC: 5.6 GM/DL (ref 6–8.4)
PROT UR QL STRIP: NEGATIVE
RBC # BLD AUTO: 1.67 M/UL (ref 4–5.4)
RBC #/AREA URNS AUTO: 4 /HPF (ref 0–4)
SAMPLE: NORMAL
SITE: ABNORMAL
SITE: NORMAL
SODIUM BLD-SCNC: 138 MMOL/L (ref 136–145)
SODIUM SERPL-SCNC: 139 MMOL/L (ref 136–145)
SP GR UR STRIP: 1.01
SPECIMEN SOURCE: ABNORMAL
SQUAMOUS #/AREA URNS AUTO: 1 /HPF
TARGETS BLD QL SMEAR: ABNORMAL
TROPONIN I SERPL HS-MCNC: 52 NG/L
TROPONIN I SERPL HS-MCNC: 54 NG/L
UROBILINOGEN UR STRIP-ACNC: NEGATIVE EU/DL
VIT B12 SERPL-MCNC: >2000 PG/ML (ref 210–950)
WBC # BLD AUTO: 12.67 K/UL (ref 3.9–12.7)
WBC #/AREA URNS AUTO: 12 /HPF (ref 0–5)

## 2025-05-27 PROCEDURE — 30233N1 TRANSFUSION OF NONAUTOLOGOUS RED BLOOD CELLS INTO PERIPHERAL VEIN, PERCUTANEOUS APPROACH: ICD-10-PCS

## 2025-05-27 PROCEDURE — 82728 ASSAY OF FERRITIN: CPT | Mod: NTX

## 2025-05-27 PROCEDURE — 85007 BL SMEAR W/DIFF WBC COUNT: CPT | Mod: NTX | Performed by: EMERGENCY MEDICINE

## 2025-05-27 PROCEDURE — 99285 EMERGENCY DEPT VISIT HI MDM: CPT | Mod: 25,NTX

## 2025-05-27 PROCEDURE — 94761 N-INVAS EAR/PLS OXIMETRY MLT: CPT | Mod: NTX,XB

## 2025-05-27 PROCEDURE — 84466 ASSAY OF TRANSFERRIN: CPT | Mod: NTX

## 2025-05-27 PROCEDURE — 80053 COMPREHEN METABOLIC PANEL: CPT | Mod: NTX | Performed by: EMERGENCY MEDICINE

## 2025-05-27 PROCEDURE — 83735 ASSAY OF MAGNESIUM: CPT | Mod: NTX | Performed by: STUDENT IN AN ORGANIZED HEALTH CARE EDUCATION/TRAINING PROGRAM

## 2025-05-27 PROCEDURE — 63600175 PHARM REV CODE 636 W HCPCS: Mod: NTX | Performed by: STUDENT IN AN ORGANIZED HEALTH CARE EDUCATION/TRAINING PROGRAM

## 2025-05-27 PROCEDURE — 86900 BLOOD TYPING SEROLOGIC ABO: CPT | Mod: NTX

## 2025-05-27 PROCEDURE — 87040 BLOOD CULTURE FOR BACTERIA: CPT | Mod: NTX

## 2025-05-27 PROCEDURE — 82607 VITAMIN B-12: CPT | Mod: NTX

## 2025-05-27 PROCEDURE — 27000221 HC OXYGEN, UP TO 24 HOURS: Mod: NTX

## 2025-05-27 PROCEDURE — 3074F SYST BP LT 130 MM HG: CPT | Mod: CPTII,S$GLB,, | Performed by: NURSE PRACTITIONER

## 2025-05-27 PROCEDURE — 82803 BLOOD GASES ANY COMBINATION: CPT | Mod: NTX

## 2025-05-27 PROCEDURE — 84484 ASSAY OF TROPONIN QUANT: CPT | Mod: NTX | Performed by: EMERGENCY MEDICINE

## 2025-05-27 PROCEDURE — 93005 ELECTROCARDIOGRAM TRACING: CPT | Mod: NTX

## 2025-05-27 PROCEDURE — 1125F AMNT PAIN NOTED PAIN PRSNT: CPT | Mod: CPTII,S$GLB,, | Performed by: NURSE PRACTITIONER

## 2025-05-27 PROCEDURE — 84484 ASSAY OF TROPONIN QUANT: CPT | Mod: NTX

## 2025-05-27 PROCEDURE — 83605 ASSAY OF LACTIC ACID: CPT | Mod: NTX

## 2025-05-27 PROCEDURE — 85027 COMPLETE CBC AUTOMATED: CPT | Mod: NTX | Performed by: STUDENT IN AN ORGANIZED HEALTH CARE EDUCATION/TRAINING PROGRAM

## 2025-05-27 PROCEDURE — 83880 ASSAY OF NATRIURETIC PEPTIDE: CPT | Mod: NTX | Performed by: EMERGENCY MEDICINE

## 2025-05-27 PROCEDURE — 82746 ASSAY OF FOLIC ACID SERUM: CPT | Mod: NTX | Performed by: STUDENT IN AN ORGANIZED HEALTH CARE EDUCATION/TRAINING PROGRAM

## 2025-05-27 PROCEDURE — 36430 TRANSFUSION BLD/BLD COMPNT: CPT | Mod: NTX

## 2025-05-27 PROCEDURE — 87186 SC STD MICRODIL/AGAR DIL: CPT | Mod: NTX

## 2025-05-27 PROCEDURE — 3288F FALL RISK ASSESSMENT DOCD: CPT | Mod: CPTII,S$GLB,, | Performed by: NURSE PRACTITIONER

## 2025-05-27 PROCEDURE — 1159F MED LIST DOCD IN RCRD: CPT | Mod: CPTII,S$GLB,, | Performed by: NURSE PRACTITIONER

## 2025-05-27 PROCEDURE — 93010 ELECTROCARDIOGRAM REPORT: CPT | Mod: NTX,,, | Performed by: INTERNAL MEDICINE

## 2025-05-27 PROCEDURE — 99900035 HC TECH TIME PER 15 MIN (STAT): Mod: NTX

## 2025-05-27 PROCEDURE — 99215 OFFICE O/P EST HI 40 MIN: CPT | Mod: S$GLB,,, | Performed by: NURSE PRACTITIONER

## 2025-05-27 PROCEDURE — 12000002 HC ACUTE/MED SURGE SEMI-PRIVATE ROOM: Mod: NTX

## 2025-05-27 PROCEDURE — 99999 PR PBB SHADOW E&M-EST. PATIENT-LVL III: CPT | Mod: PBBFAC,,, | Performed by: NURSE PRACTITIONER

## 2025-05-27 PROCEDURE — 81001 URINALYSIS AUTO W/SCOPE: CPT | Mod: NTX

## 2025-05-27 PROCEDURE — 1101F PT FALLS ASSESS-DOCD LE1/YR: CPT | Mod: CPTII,S$GLB,, | Performed by: NURSE PRACTITIONER

## 2025-05-27 PROCEDURE — 3078F DIAST BP <80 MM HG: CPT | Mod: CPTII,S$GLB,, | Performed by: NURSE PRACTITIONER

## 2025-05-27 PROCEDURE — 25000003 PHARM REV CODE 250: Mod: NTX

## 2025-05-27 RX ORDER — PANTOPRAZOLE SODIUM 40 MG/10ML
80 INJECTION, POWDER, LYOPHILIZED, FOR SOLUTION INTRAVENOUS ONCE
Status: COMPLETED | OUTPATIENT
Start: 2025-05-27 | End: 2025-05-27

## 2025-05-27 RX ORDER — MIRTAZAPINE 7.5 MG/1
7.5 TABLET, FILM COATED ORAL NIGHTLY
Status: DISCONTINUED | OUTPATIENT
Start: 2025-05-27 | End: 2025-05-31 | Stop reason: HOSPADM

## 2025-05-27 RX ORDER — MORPHINE SULFATE 4 MG/ML
4 INJECTION, SOLUTION INTRAMUSCULAR; INTRAVENOUS
Refills: 0 | Status: DISCONTINUED | OUTPATIENT
Start: 2025-05-27 | End: 2025-05-27

## 2025-05-27 RX ORDER — ALLOPURINOL 300 MG/1
300 TABLET ORAL DAILY
COMMUNITY

## 2025-05-27 RX ORDER — PANTOPRAZOLE SODIUM 40 MG/10ML
40 INJECTION, POWDER, LYOPHILIZED, FOR SOLUTION INTRAVENOUS EVERY 12 HOURS
Status: DISCONTINUED | OUTPATIENT
Start: 2025-05-28 | End: 2025-05-27

## 2025-05-27 RX ORDER — FLUTICASONE FUROATE AND VILANTEROL 100; 25 UG/1; UG/1
1 POWDER RESPIRATORY (INHALATION) DAILY
Status: DISCONTINUED | OUTPATIENT
Start: 2025-05-28 | End: 2025-05-31 | Stop reason: HOSPADM

## 2025-05-27 RX ORDER — ALLOPURINOL 300 MG/1
300 TABLET ORAL DAILY
Status: DISCONTINUED | OUTPATIENT
Start: 2025-05-28 | End: 2025-05-31 | Stop reason: HOSPADM

## 2025-05-27 RX ORDER — OXYCODONE HYDROCHLORIDE 5 MG/1
5 TABLET ORAL ONCE AS NEEDED
Refills: 0 | Status: COMPLETED | OUTPATIENT
Start: 2025-05-27 | End: 2025-05-28

## 2025-05-27 RX ORDER — PANTOPRAZOLE SODIUM 40 MG/10ML
80 INJECTION, POWDER, LYOPHILIZED, FOR SOLUTION INTRAVENOUS DAILY
Status: DISCONTINUED | OUTPATIENT
Start: 2025-05-28 | End: 2025-05-27

## 2025-05-27 RX ORDER — MONTELUKAST SODIUM 10 MG/1
10 TABLET ORAL NIGHTLY
COMMUNITY
Start: 2025-04-10

## 2025-05-27 RX ORDER — PANTOPRAZOLE SODIUM 40 MG/10ML
80 INJECTION, POWDER, LYOPHILIZED, FOR SOLUTION INTRAVENOUS
Status: DISCONTINUED | OUTPATIENT
Start: 2025-05-27 | End: 2025-05-27

## 2025-05-27 RX ORDER — PANTOPRAZOLE SODIUM 40 MG/10ML
40 INJECTION, POWDER, LYOPHILIZED, FOR SOLUTION INTRAVENOUS EVERY 12 HOURS
Status: DISCONTINUED | OUTPATIENT
Start: 2025-05-27 | End: 2025-05-27

## 2025-05-27 RX ORDER — HYDROCODONE BITARTRATE AND ACETAMINOPHEN 500; 5 MG/1; MG/1
TABLET ORAL
Status: DISCONTINUED | OUTPATIENT
Start: 2025-05-27 | End: 2025-05-31 | Stop reason: HOSPADM

## 2025-05-27 RX ORDER — ATORVASTATIN CALCIUM 40 MG/1
80 TABLET, FILM COATED ORAL DAILY
Status: DISCONTINUED | OUTPATIENT
Start: 2025-05-28 | End: 2025-05-31 | Stop reason: HOSPADM

## 2025-05-27 RX ORDER — POTASSIUM CHLORIDE 7.45 MG/ML
10 INJECTION INTRAVENOUS
Status: DISPENSED | OUTPATIENT
Start: 2025-05-27 | End: 2025-05-28

## 2025-05-27 RX ORDER — PANTOPRAZOLE SODIUM 40 MG/10ML
40 INJECTION, POWDER, LYOPHILIZED, FOR SOLUTION INTRAVENOUS EVERY 12 HOURS
Status: DISCONTINUED | OUTPATIENT
Start: 2025-05-28 | End: 2025-05-30

## 2025-05-27 RX ADMIN — POTASSIUM CHLORIDE 10 MEQ: 7.46 INJECTION, SOLUTION INTRAVENOUS at 11:05

## 2025-05-27 RX ADMIN — POTASSIUM BICARBONATE 40 MEQ: 391 TABLET, EFFERVESCENT ORAL at 10:05

## 2025-05-27 RX ADMIN — PANTOPRAZOLE SODIUM 80 MG: 40 INJECTION, POWDER, LYOPHILIZED, FOR SOLUTION INTRAVENOUS at 10:05

## 2025-05-27 NOTE — TELEPHONE ENCOUNTER
Returned call and scheduled appt with dr holguin June 6 8am. Informed that I will let provider know regarding stool studies and get back with them. Informed that if symptoms persist we advised to go to er. Verbal understanding

## 2025-05-27 NOTE — TELEPHONE ENCOUNTER
Spoke with patient to schedule visit for anemia. Patient asked if I could contact her  to schedule.  did not answer. LVM and call back number

## 2025-05-27 NOTE — PROGRESS NOTES
Subjective     Patient ID: Jessica Floyd is a 74 y.o. female.  BP (!) 112/52 (BP Location: Right arm, Patient Position: Sitting)      Chief Complaint: Follow-up, Diarrhea, Wound Check, and Numbness (On left  leg)    Patient accompanied to today's visit by her son and daughter in law. Son states patient was too week to get in the car to come to today's visit, so they had to call the fire department to put her in the car. Patient with acute onset diarrhea 24 hours ago. Son reports the stools have been frequent and black in color. Patient has not been able to eat today due to nausea. She now has new onset numbness to the L side of her body and cannot stand without assistance. She also has a wound to the outside of her L ankle that appears infected.     Rapid called in clinic when patient was noted to be lethargic / BP 61/39 / HR 48. Patient placed on exam table in trendelenburg position. 18g IV started to R hand and started NS bolus. Patient began to perk up and BP improved to 137/72 / HR 90. EKG obtained in clinic with PVC's and prolonged QT interval. Patient took her Seroquel today, but took now BP meds.       Problem List[1]     Current Medications[2]     Review of Systems   Constitutional:  Positive for fatigue. Negative for fever.   Gastrointestinal:  Positive for blood in stool, diarrhea and nausea.   Musculoskeletal:  Positive for gait problem.   Neurological:  Positive for weakness and numbness. Negative for seizures and syncope.          Objective     Physical Exam  Constitutional:       Appearance: She is ill-appearing. She is not toxic-appearing or diaphoretic.   HENT:      Head: Normocephalic and atraumatic.      Mouth/Throat:      Mouth: Mucous membranes are dry.   Cardiovascular:      Rate and Rhythm: Regular rhythm. Bradycardia present.      Heart sounds: Normal heart sounds.   Pulmonary:      Effort: Pulmonary effort is normal.      Breath sounds: Normal breath sounds.   Abdominal:      General: Abdomen  is flat. There is no distension.      Palpations: Abdomen is soft. There is no mass.      Tenderness: There is no abdominal tenderness. There is no right CVA tenderness, left CVA tenderness, guarding or rebound.      Hernia: No hernia is present.   Musculoskeletal:         General: Swelling present.      Right lower leg: Edema present.      Left lower leg: Edema present.   Skin:     General: Skin is dry.      Findings: Erythema (L ankle) and lesion (woud to L lateral ankle) present.   Neurological:      Motor: Weakness (LLE) present.          Assessment and Plan     1. Hypotension, unspecified hypotension type; hypotensive in clinic. BP 61/39, in setting of acute diarrhea and wound to L ankle. Patient responsive to IVF's. BP improved to 137/72. Differential diagnoses include: acute blood loss anemia, GIB, dehydration, sepsis. Refer to ED for further evaluation and management   -     Refer to Emergency Dept.    2. Wound of left lower extremity, initial encounter; new problem. Appears infected. Refer to ED for further evaluation and management   -     Refer to Emergency Dept.    3. Left leg numbness; new problem. Cannot rule out stroke. Refer to ED for further evaluation and management   -     Refer to Emergency Dept.    4. Diarrhea, unspecified type; new problem x 24 hours. Black stools. Suspect possible GIB. Refer to ED for further evaluation and management   -     Refer to Emergency Dept.          Rafael Ruiz NP   Internal Medicine           Should the patient's symptoms worsen, persist, or fail to improve they should return for reevaluation and I would be happy to see them back anytime            [1]   Patient Active Problem List  Diagnosis    Coronary artery disease involving native coronary artery of native heart without angina pectoris    Bilateral carotid artery stenosis    Gastroesophageal reflux disease without esophagitis    CKD stage G3a/A1, GFR 45-59 and albumin creatinine ratio <30 mg/g     Hypokalemia    Hypertension    Subclinical hyperthyroidism    Allergic rhinitis    Iron deficiency anemia secondary to blood loss (chronic)    History of fracture of left hip    Leukocytosis    Acute blood loss anemia    Malnutrition of mild degree    Pathological fracture due to osteoporosis    Osteoporosis    Localized osteoporosis of [place]    Impaired mobility    Reduced vision-Left eye    Gout    Right rotator cuff tear arthropathy    SOB (shortness of breath)    Eosinophilia    Anxiety    PAC (premature atrial contraction)    Aortic atherosclerosis    Pulmonary emphysema    Weight loss    Moderate malnutrition    (HFpEF) heart failure with preserved ejection fraction    History Situational (ie Stress Induced) syncope ([facility] 12-25-23    Nicotine dependence, cigarettes, in FULL REMISSION:in past smoked for 17yrs from 16y to about 34y / LUPE:F pack a day    Ischemia, bowel    Hypernatremia    Incarcerated inguinal hernia    Left leg pain    Abrasion    Comfort measures only status    Localized swelling of left lower extremity    Thrombocytosis    Skin ulcer of sacrum, with unspecified severity    Monocytosis   [2]   Current Outpatient Medications   Medication Sig Dispense Refill    allopurinoL 200 mg Tab TAKE 300 MG BY MOUTH ONCE DAILY. 90 tablet 3    amLODIPine (NORVASC) 5 MG tablet Take 1 tablet (5 mg total) by mouth once daily.      atorvastatin (LIPITOR) 80 MG tablet TAKE 1 TABLET BY MOUTH EVERY DAY 90 tablet 3    carvediloL (COREG) 6.25 MG tablet Take 1 tablet (6.25 mg total) by mouth 2 (two) times daily. 180 tablet 3    celecoxib (CELEBREX) 200 MG capsule TAKE 1 CAPSULE BY MOUTH DAILY AS NEEDED FOR PAIN. 30 capsule 2    colchicine (COLCRYS) 0.6 mg tablet TAKE 1 TABLET (0.6 MG TOTAL) BY MOUTH ONCE DAILY. AS NEEDED FOR GOUT 90 tablet 3    cyanocobalamin 1,000 mcg/mL injection Inject 1,000 mcg into the muscle every 7 days. 10 mL 5    erythromycin (ROMYCIN) ophthalmic ointment Place into the right  eye every evening. Apply 1/2 inch ribbon. 3.5 g 0    fluticasone propionate (FLONASE) 50 mcg/actuation nasal spray 2 sprays (100 mcg total) by Each Nostril route once daily.      fluticasone-umeclidin-vilanter (TRELEGY ELLIPTA) 200-62.5-25 mcg inhaler Inhale 1 puff into the lungs once daily. 60 each 11    hydroCHLOROthiazide (HYDRODIURIL) 25 MG tablet Take 1 tablet (25 mg total) by mouth once daily. 90 tablet 3    LIDOcaine 4 % Gel Apply 1 g topically 2 (two) times daily as needed (foot pain).      LIDOcaine-prilocaine (EMLA) cream Apply topically as needed. 30 g 3    mirtazapine (REMERON) 7.5 MG Tab Take 1 tablet (7.5 mg total) by mouth every evening. 90 tablet 1    montelukast (SINGULAIR) 10 mg tablet Take 10 mg by mouth.      pantoprazole (PROTONIX) 40 MG tablet Take 1 tablet (40 mg total) by mouth 2 (two) times daily. 180 tablet 3    QUEtiapine (SEROQUEL) 50 MG tablet Take 1 tablet (50 mg total) by mouth 2 (two) times daily. 180 tablet 1    syringe, disposable, 1 mL Syrg 1 Stick by Misc.(Non-Drug; Combo Route) route once a week. 25 each 1     No current facility-administered medications for this visit.     Facility-Administered Medications Ordered in Other Visits   Medication Dose Route Frequency Provider Last Rate Last Admin    mupirocin 2 % ointment   Nasal On Call Procedure Kang Diaz MD   Given at 10/25/23 1045    tranexamic acid (CYKLOKAPRON) 1,000 mg in sodium chloride 0.9 % 100 mL IVPB (MB+)  1,000 mg Intravenous Once Kang Diaz MD        tranexamic acid (CYKLOKAPRON) 1,000 mg in sodium chloride 0.9 % 100 mL IVPB (MB+)  1,000 mg Intravenous Once Kang Diaz MD

## 2025-05-27 NOTE — LETTER
May 31, 2025             [place] [id number] [facility] [address]  Phone: [phone]  May 31, 2025         To Whom It May Concern:    Ms. Ramila Floyd's mother was admitted to the hospital on 5/27/2025 and discharged on 05/31/25. During her hospitalization and in the immediate post hospitalization period she will need substantial assistance and care at home. Please excuse Ms. Ramila Floyd from work for the duration of her Mother's recovery period as Ms. Floyd continues to offer vital care at home, at least until 06/13 or further if her mother's condition does not improve as quickly as expected.           Sincerely,        Rody Klein MD  Department of Hospital Medicine

## 2025-05-27 NOTE — PROGRESS NOTES
..Response Team - Patient Flow Sheet     Date: 2025  Time: 3:00pm  Location: Primary Care  Name: Jessica Floyd  : 1950  MRN: [id number]  PCP: Jace Valencia MD  Allergies:   Review of patient's allergies indicates:   Allergen Reactions    Hydromorphone Anxiety, Other (See Comments) and Hallucinations     Severe agitation     Past Medical History:    Past Medical History:   Diagnosis Date    Allergic rhinitis     Amblyopia     Carotid stenosis     Coronary atherosclerosis     [facility] 2014: 20% mLAD, 50% mCx, 20%, mRCA    COVID-19 11/15/2023    Dyslipidemia     ESBL (extended spectrum beta-lactamase) producing bacteria infection     UTI    Hypertension     Influenza A 2023    Nausea and vomiting 2017    Pulmonary emphysema 10/28/2023    SAH (subarachnoid hemorrhage) 2016, s/p clipping    Subarachnoid hemorrhage due to ruptured aneurysm        Reason for response team call: Fatigue    Injury:  No    Vitals:  Time BP HR Resp SPO2 Glucose   3:15 61/39 48  98%    3:28 137/72 90  98% 157                               EKG performed: Yes  EKG Ordered By: Paxton Hall  EKG Read By: Paxton Hall    Medication 1  Fluid / Medication: Normal Saline  Time: 3:21pm  Gauge:  18   Rate: bolus   Started by: PAXTON Hall    Medication 2  Fluid / Medication: N/A  Time: n/a  Gauge:  N/A   Rate: n/a   Started by: n/a        Time Notes   3:16  EMS/security called   3:19 Pt. Placed in Trendelenburg position/ legs elevated hearted level   3:21 I.V inserted R hand 18g    3:25 EKG performed   3:29  3:42 EMS called for ETA   EMS arrived, departed with pt on stretcher at 3:50pm     Response Team Members: Shannon Waldrop Shonna, Kerry, Carolyn, and Earl  Provider Response Called:  Yes   Provider Name/Time: PAXTON Hall    Ambulance Called:  Yes  time: 3:16pm, arrival time: 3:50pm  Patient Transported To: [facility]  Report Called To: E.R nurse by provider  Family/Friend/Caregiver Notified: Yes

## 2025-05-27 NOTE — TELEPHONE ENCOUNTER
Spoke with pt  and informed that radha gomez put the stool study orders in. Pt will have to turn in 3 samples. Verbal understanding

## 2025-05-27 NOTE — TELEPHONE ENCOUNTER
Spoke with Mr Floyd - wants to reschedule Mrs Floyd a couple of weeks out. He was not able to stay on the phone while I tried to reschedule. I told him I would call him with a date and time.    ----- Message from Eduardo San sent at 5/27/2025 12:05 PM CDT -----    ----- Message -----  From: Kylah Smyth  Sent: 5/27/2025  12:04 PM CDT  To: Jaison Cornelius Staff    Consult/AdvisoryName Of Caller:Mr Floyd Contact Preference:[phone] of call: Pt  called to reschedule her appts it's showing me avail appts with the testing,but not with the dr please call to assist

## 2025-05-27 NOTE — TELEPHONE ENCOUNTER
----- Message from Julieta sent at 5/27/2025 11:05 AM CDT -----  Regarding: Sooner appt  Contact: Sandie [phone]  Type:  Sooner Apoointment RequestCaller is requesting a sooner appointment.  Caller declined first available appointment listed below.  Caller will not accept being placed on the waitlist and is requesting a message be sent to doctor.Name of Caller:Sandie called in regards to getting the pt a sooner appt original appt is Mon 7/7 When is the first available appointment?No available appt in Epic Symptoms:GI distress Would the patient rather a call back or a response via [facility]? Call back Best Call Back Number: Sandie [phone] Information: they are asking can they asking can they bring in a stool sample because it is believed to have blood in her stool

## 2025-05-27 NOTE — TELEPHONE ENCOUNTER
----- Message from Tracy sent at 5/27/2025  7:32 AM CDT -----  Regarding: Sooner Appt  Contact: Patient Spouse Lizeth  Type:  Sooner Apoointment RequestCaller is requesting a sooner appointment.  Caller declined first available appointment listed below.  Caller will not accept being placed on the waitlist and is requesting a message be sent to doctor.Name of Caller: Lizeth (Spouse)When is the first available appointment? 07/03/2025 Symptoms: diarrhea Would the patient rather a call back or a response via [facility]? Call back Best Call Back Number: [phone] Additional Information: Spouse stated patient is in digestive distress and would like patient to be seen today if possible Spouse stated it is not an emergency and does not want to go to ED but would like an appt with Dr. Syed or and PA or NP available for further assistance

## 2025-05-28 ENCOUNTER — ANESTHESIA EVENT (OUTPATIENT)
Dept: ENDOSCOPY | Facility: HOSPITAL | Age: 75
End: 2025-05-28
Payer: MEDICARE

## 2025-05-28 ENCOUNTER — ANESTHESIA (OUTPATIENT)
Dept: ENDOSCOPY | Facility: HOSPITAL | Age: 75
End: 2025-05-28
Payer: MEDICARE

## 2025-05-28 PROBLEM — D50.0 IRON DEFICIENCY ANEMIA SECONDARY TO BLOOD LOSS (CHRONIC): Chronic | Status: ACTIVE | Noted: 2023-04-17

## 2025-05-28 PROBLEM — K92.2 ACUTE GI BLEEDING: Chronic | Status: ACTIVE | Noted: 2024-05-14

## 2025-05-28 PROBLEM — E83.42 HYPOMAGNESEMIA: Chronic | Status: ACTIVE | Noted: 2025-05-28

## 2025-05-28 PROBLEM — I50.30 (HFPEF) HEART FAILURE WITH PRESERVED EJECTION FRACTION: Chronic | Status: ACTIVE | Noted: 2024-03-26

## 2025-05-28 PROBLEM — E87.6 HYPOKALEMIA: Chronic | Status: ACTIVE | Noted: 2017-05-26

## 2025-05-28 PROBLEM — T14.8XXA ABRASION: Chronic | Status: ACTIVE | Noted: 2024-05-22

## 2025-05-28 LAB
ABO + RH BLD: NORMAL
ABO + RH BLD: NORMAL
ABSOLUTE EOSINOPHIL (OHS): 0.14 K/UL
ABSOLUTE MONOCYTE (OHS): 3.74 K/UL (ref 0.3–1)
ABSOLUTE NEUTROPHIL COUNT (OHS): 7.34 K/UL (ref 1.8–7.7)
ABSOLUTE NEUTROPHIL MANUAL (OHS): 9.1 K/UL
ANION GAP (OHS): 10 MMOL/L (ref 8–16)
ANISOCYTOSIS BLD QL SMEAR: SLIGHT
ANISOCYTOSIS BLD QL SMEAR: SLIGHT
BASO STIPL BLD QL SMEAR: ABNORMAL
BASO STIPL BLD QL SMEAR: NORMAL
BASOPHILS # BLD AUTO: 0.25 K/UL
BASOPHILS NFR BLD AUTO: 1.9 %
BLD PROD TYP BPU: NORMAL
BLD PROD TYP BPU: NORMAL
BLOOD UNIT EXPIRATION DATE: NORMAL
BLOOD UNIT EXPIRATION DATE: NORMAL
BLOOD UNIT TYPE CODE: 6200
BLOOD UNIT TYPE CODE: 6200
BUN SERPL-MCNC: 28 MG/DL (ref 8–23)
CALCIUM SERPL-MCNC: 7.5 MG/DL (ref 8.7–10.5)
CHLORIDE SERPL-SCNC: 104 MMOL/L (ref 95–110)
CO2 SERPL-SCNC: 22 MMOL/L (ref 23–29)
CREAT SERPL-MCNC: 0.7 MG/DL (ref 0.5–1.4)
CROSSMATCH INTERPRETATION: NORMAL
CROSSMATCH INTERPRETATION: NORMAL
DISPENSE STATUS: NORMAL
DISPENSE STATUS: NORMAL
EOSINOPHIL NFR BLD MANUAL: 0.5 % (ref 0–8)
ERYTHROCYTE [DISTWIDTH] IN BLOOD BY AUTOMATED COUNT: 18.2 % (ref 11.5–14.5)
ERYTHROCYTE [DISTWIDTH] IN BLOOD BY AUTOMATED COUNT: 18.3 % (ref 11.5–14.5)
FERRITIN SERPL-MCNC: 233 NG/ML (ref 20–300)
GFR SERPLBLD CREATININE-BSD FMLA CKD-EPI: >60 ML/MIN/1.73/M2
GLUCOSE SERPL-MCNC: 92 MG/DL (ref 70–110)
HCT VFR BLD AUTO: 16.2 % (ref 37–48.5)
HCT VFR BLD AUTO: 24.7 % (ref 37–48.5)
HGB BLD-MCNC: 4.9 GM/DL (ref 12–16)
HGB BLD-MCNC: 7.8 GM/DL (ref 12–16)
HOLD SPECIMEN: NORMAL
HYPOCHROMIA BLD QL SMEAR: ABNORMAL
HYPOCHROMIA BLD QL SMEAR: NORMAL
IMM GRANULOCYTES # BLD AUTO: 0.63 K/UL (ref 0–0.04)
IMM GRANULOCYTES NFR BLD AUTO: 4.7 % (ref 0–0.5)
INDIRECT COOMBS: NORMAL
IRON SATN MFR SERPL: 7 % (ref 20–50)
IRON SERPL-MCNC: 21 UG/DL (ref 30–160)
LYMPHOCYTES # BLD AUTO: 1.37 K/UL (ref 1–4.8)
LYMPHOCYTES NFR BLD MANUAL: 14 % (ref 18–48)
MAGNESIUM SERPL-MCNC: 2.1 MG/DL (ref 1.6–2.6)
MCH RBC QN AUTO: 30.1 PG (ref 27–31)
MCH RBC QN AUTO: 30.2 PG (ref 27–31)
MCHC RBC AUTO-ENTMCNC: 30.2 G/DL (ref 32–36)
MCHC RBC AUTO-ENTMCNC: 31.6 G/DL (ref 32–36)
MCV RBC AUTO: 100 FL (ref 82–98)
MCV RBC AUTO: 95 FL (ref 82–98)
MONOCYTES NFR BLD MANUAL: 7 % (ref 4–15)
NEUTROPHILS NFR BLD MANUAL: 77 % (ref 38–73)
NEUTS BAND NFR BLD MANUAL: 1.5 %
NUCLEATED RBC (/100WBC) (OHS): 0 /100 WBC
NUCLEATED RBC (/100WBC) (OHS): 0 /100 WBC
OHS QRS DURATION: 88 MS
OHS QTC CALCULATION: 452 MS
PLATELET # BLD AUTO: 361 K/UL (ref 150–450)
PLATELET # BLD AUTO: 369 K/UL (ref 150–450)
PLATELET BLD QL SMEAR: ABNORMAL
PLATELET BLD QL SMEAR: NORMAL
PMV BLD AUTO: 10.2 FL (ref 9.2–12.9)
PMV BLD AUTO: 10.6 FL (ref 9.2–12.9)
POLYCHROMASIA BLD QL SMEAR: ABNORMAL
POLYCHROMASIA BLD QL SMEAR: NORMAL
POTASSIUM SERPL-SCNC: 4.4 MMOL/L (ref 3.5–5.1)
RBC # BLD AUTO: 1.62 M/UL (ref 4–5.4)
RBC # BLD AUTO: 2.59 M/UL (ref 4–5.4)
RELATIVE EOSINOPHIL (OHS): 1 %
RELATIVE LYMPHOCYTE (OHS): 10.2 % (ref 18–48)
RELATIVE MONOCYTE (OHS): 27.8 % (ref 4–15)
RELATIVE NEUTROPHIL (OHS): 54.4 % (ref 38–73)
RH BLD: NORMAL
SODIUM SERPL-SCNC: 136 MMOL/L (ref 136–145)
SPECIMEN OUTDATE: NORMAL
TIBC SERPL-MCNC: 300 UG/DL (ref 250–450)
TRANSFERRIN SERPL-MCNC: 203 MG/DL (ref 200–375)
UNIT NUMBER: NORMAL
UNIT NUMBER: NORMAL
WBC # BLD AUTO: 11.58 K/UL (ref 3.9–12.7)
WBC # BLD AUTO: 13.47 K/UL (ref 3.9–12.7)

## 2025-05-28 PROCEDURE — 27000221 HC OXYGEN, UP TO 24 HOURS: Mod: NTX

## 2025-05-28 PROCEDURE — 25000003 PHARM REV CODE 250: Mod: NTX | Performed by: STUDENT IN AN ORGANIZED HEALTH CARE EDUCATION/TRAINING PROGRAM

## 2025-05-28 PROCEDURE — 85025 COMPLETE CBC W/AUTO DIFF WBC: CPT | Mod: NTX | Performed by: STUDENT IN AN ORGANIZED HEALTH CARE EDUCATION/TRAINING PROGRAM

## 2025-05-28 PROCEDURE — 94761 N-INVAS EAR/PLS OXIMETRY MLT: CPT | Mod: NTX

## 2025-05-28 PROCEDURE — P9016 RBC LEUKOCYTES REDUCED: HCPCS | Mod: NTX

## 2025-05-28 PROCEDURE — 43235 EGD DIAGNOSTIC BRUSH WASH: CPT | Mod: GC,NTX,, | Performed by: INTERNAL MEDICINE

## 2025-05-28 PROCEDURE — 11000001 HC ACUTE MED/SURG PRIVATE ROOM: Mod: NTX

## 2025-05-28 PROCEDURE — 94799 UNLISTED PULMONARY SVC/PX: CPT | Mod: NTX

## 2025-05-28 PROCEDURE — 83735 ASSAY OF MAGNESIUM: CPT | Mod: NTX | Performed by: STUDENT IN AN ORGANIZED HEALTH CARE EDUCATION/TRAINING PROGRAM

## 2025-05-28 PROCEDURE — 25000242 PHARM REV CODE 250 ALT 637 W/ HCPCS: Mod: NTX | Performed by: STUDENT IN AN ORGANIZED HEALTH CARE EDUCATION/TRAINING PROGRAM

## 2025-05-28 PROCEDURE — 37000009 HC ANESTHESIA EA ADD 15 MINS: Mod: NTX | Performed by: INTERNAL MEDICINE

## 2025-05-28 PROCEDURE — 80048 BASIC METABOLIC PNL TOTAL CA: CPT | Mod: NTX | Performed by: STUDENT IN AN ORGANIZED HEALTH CARE EDUCATION/TRAINING PROGRAM

## 2025-05-28 PROCEDURE — 0DJ08ZZ INSPECTION OF UPPER INTESTINAL TRACT, VIA NATURAL OR ARTIFICIAL OPENING ENDOSCOPIC: ICD-10-PCS | Performed by: INTERNAL MEDICINE

## 2025-05-28 PROCEDURE — 25000003 PHARM REV CODE 250: Mod: NTX | Performed by: NURSE ANESTHETIST, CERTIFIED REGISTERED

## 2025-05-28 PROCEDURE — 37000008 HC ANESTHESIA 1ST 15 MINUTES: Mod: NTX | Performed by: INTERNAL MEDICINE

## 2025-05-28 PROCEDURE — 63600175 PHARM REV CODE 636 W HCPCS: Mod: NTX | Performed by: STUDENT IN AN ORGANIZED HEALTH CARE EDUCATION/TRAINING PROGRAM

## 2025-05-28 PROCEDURE — 43235 EGD DIAGNOSTIC BRUSH WASH: CPT | Mod: NTX | Performed by: INTERNAL MEDICINE

## 2025-05-28 PROCEDURE — 99223 1ST HOSP IP/OBS HIGH 75: CPT | Mod: 25,GC,NTX, | Performed by: INTERNAL MEDICINE

## 2025-05-28 PROCEDURE — 63600175 PHARM REV CODE 636 W HCPCS: Mod: NTX | Performed by: NURSE ANESTHETIST, CERTIFIED REGISTERED

## 2025-05-28 PROCEDURE — 36415 COLL VENOUS BLD VENIPUNCTURE: CPT | Mod: NTX | Performed by: STUDENT IN AN ORGANIZED HEALTH CARE EDUCATION/TRAINING PROGRAM

## 2025-05-28 PROCEDURE — 99900035 HC TECH TIME PER 15 MIN (STAT): Mod: NTX

## 2025-05-28 PROCEDURE — 20600001 HC STEP DOWN PRIVATE ROOM: Mod: NTX

## 2025-05-28 PROCEDURE — 86920 COMPATIBILITY TEST SPIN: CPT | Mod: NTX

## 2025-05-28 PROCEDURE — 25500020 PHARM REV CODE 255: Mod: NTX | Performed by: HOSPITALIST

## 2025-05-28 RX ORDER — LIDOCAINE HYDROCHLORIDE 20 MG/ML
INJECTION INTRAVENOUS
Status: DISCONTINUED | OUTPATIENT
Start: 2025-05-28 | End: 2025-05-28

## 2025-05-28 RX ORDER — SIMETHICONE 80 MG
1 TABLET,CHEWABLE ORAL 4 TIMES DAILY PRN
Status: DISCONTINUED | OUTPATIENT
Start: 2025-05-28 | End: 2025-05-31 | Stop reason: HOSPADM

## 2025-05-28 RX ORDER — ONDANSETRON 4 MG/1
8 TABLET, ORALLY DISINTEGRATING ORAL EVERY 8 HOURS PRN
Status: DISCONTINUED | OUTPATIENT
Start: 2025-05-28 | End: 2025-05-31 | Stop reason: HOSPADM

## 2025-05-28 RX ORDER — PROPOFOL 10 MG/ML
VIAL (ML) INTRAVENOUS
Status: DISCONTINUED | OUTPATIENT
Start: 2025-05-28 | End: 2025-05-28

## 2025-05-28 RX ORDER — GLUCAGON 1 MG
1 KIT INJECTION
Status: DISCONTINUED | OUTPATIENT
Start: 2025-05-28 | End: 2025-05-31 | Stop reason: HOSPADM

## 2025-05-28 RX ORDER — ACETAMINOPHEN 325 MG/1
650 TABLET ORAL EVERY 4 HOURS PRN
Status: DISCONTINUED | OUTPATIENT
Start: 2025-05-28 | End: 2025-05-31 | Stop reason: HOSPADM

## 2025-05-28 RX ORDER — POLYETHYLENE GLYCOL 3350 17 G/17G
17 POWDER, FOR SOLUTION ORAL DAILY PRN
Status: DISCONTINUED | OUTPATIENT
Start: 2025-05-28 | End: 2025-05-31 | Stop reason: HOSPADM

## 2025-05-28 RX ORDER — SODIUM CHLORIDE 0.9 % (FLUSH) 0.9 %
3 SYRINGE (ML) INJECTION
Status: DISCONTINUED | OUTPATIENT
Start: 2025-05-28 | End: 2025-05-28 | Stop reason: HOSPADM

## 2025-05-28 RX ORDER — IBUPROFEN 200 MG
24 TABLET ORAL
Status: DISCONTINUED | OUTPATIENT
Start: 2025-05-28 | End: 2025-05-31 | Stop reason: HOSPADM

## 2025-05-28 RX ORDER — MAGNESIUM SULFATE HEPTAHYDRATE 40 MG/ML
2 INJECTION, SOLUTION INTRAVENOUS ONCE
Status: COMPLETED | OUTPATIENT
Start: 2025-05-28 | End: 2025-05-28

## 2025-05-28 RX ORDER — QUETIAPINE FUMARATE 25 MG/1
50 TABLET, FILM COATED ORAL NIGHTLY
Status: DISCONTINUED | OUTPATIENT
Start: 2025-05-28 | End: 2025-05-31 | Stop reason: HOSPADM

## 2025-05-28 RX ORDER — BISACODYL 5 MG
10 TABLET, DELAYED RELEASE (ENTERIC COATED) ORAL ONCE
Status: COMPLETED | OUTPATIENT
Start: 2025-05-28 | End: 2025-05-28

## 2025-05-28 RX ORDER — ALUMINUM HYDROXIDE, MAGNESIUM HYDROXIDE, AND SIMETHICONE 1200; 120; 1200 MG/30ML; MG/30ML; MG/30ML
30 SUSPENSION ORAL 4 TIMES DAILY PRN
Status: DISCONTINUED | OUTPATIENT
Start: 2025-05-28 | End: 2025-05-31 | Stop reason: HOSPADM

## 2025-05-28 RX ORDER — GLUCAGON 1 MG
1 KIT INJECTION
Status: DISCONTINUED | OUTPATIENT
Start: 2025-05-28 | End: 2025-05-28 | Stop reason: HOSPADM

## 2025-05-28 RX ORDER — SODIUM CHLORIDE 0.9 % (FLUSH) 0.9 %
1-10 SYRINGE (ML) INJECTION EVERY 12 HOURS PRN
Status: DISCONTINUED | OUTPATIENT
Start: 2025-05-28 | End: 2025-05-31 | Stop reason: HOSPADM

## 2025-05-28 RX ORDER — POLYETHYLENE GLYCOL 3350, SODIUM SULFATE ANHYDROUS, SODIUM BICARBONATE, SODIUM CHLORIDE, POTASSIUM CHLORIDE 236; 22.74; 6.74; 5.86; 2.97 G/4L; G/4L; G/4L; G/4L; G/4L
4000 POWDER, FOR SOLUTION ORAL ONCE
Status: COMPLETED | OUTPATIENT
Start: 2025-05-28 | End: 2025-05-28

## 2025-05-28 RX ORDER — IBUPROFEN 200 MG
16 TABLET ORAL
Status: DISCONTINUED | OUTPATIENT
Start: 2025-05-28 | End: 2025-05-31 | Stop reason: HOSPADM

## 2025-05-28 RX ORDER — LORAZEPAM 2 MG/ML
1 INJECTION INTRAMUSCULAR ONCE AS NEEDED
Status: COMPLETED | OUTPATIENT
Start: 2025-05-28 | End: 2025-05-28

## 2025-05-28 RX ORDER — IPRATROPIUM BROMIDE AND ALBUTEROL SULFATE 2.5; .5 MG/3ML; MG/3ML
3 SOLUTION RESPIRATORY (INHALATION) EVERY 6 HOURS PRN
Status: DISCONTINUED | OUTPATIENT
Start: 2025-05-28 | End: 2025-05-31 | Stop reason: HOSPADM

## 2025-05-28 RX ORDER — NALOXONE HCL 0.4 MG/ML
0.02 VIAL (ML) INJECTION
Status: DISCONTINUED | OUTPATIENT
Start: 2025-05-28 | End: 2025-05-31 | Stop reason: HOSPADM

## 2025-05-28 RX ORDER — PROPOFOL 10 MG/ML
VIAL (ML) INTRAVENOUS CONTINUOUS PRN
Status: DISCONTINUED | OUTPATIENT
Start: 2025-05-28 | End: 2025-05-28

## 2025-05-28 RX ORDER — TALC
6 POWDER (GRAM) TOPICAL NIGHTLY PRN
Status: DISCONTINUED | OUTPATIENT
Start: 2025-05-28 | End: 2025-05-31 | Stop reason: HOSPADM

## 2025-05-28 RX ORDER — DOXYCYCLINE HYCLATE 100 MG
100 TABLET ORAL EVERY 12 HOURS
Status: DISCONTINUED | OUTPATIENT
Start: 2025-05-28 | End: 2025-05-31 | Stop reason: HOSPADM

## 2025-05-28 RX ADMIN — OXYCODONE HYDROCHLORIDE 5 MG: 5 TABLET ORAL at 12:05

## 2025-05-28 RX ADMIN — MIRTAZAPINE 7.5 MG: 7.5 TABLET, FILM COATED ORAL at 12:05

## 2025-05-28 RX ADMIN — ONDANSETRON 8 MG: 4 TABLET, ORALLY DISINTEGRATING ORAL at 11:05

## 2025-05-28 RX ADMIN — Medication 6 MG: at 01:05

## 2025-05-28 RX ADMIN — PANTOPRAZOLE SODIUM 40 MG: 40 INJECTION, POWDER, FOR SOLUTION INTRAVENOUS at 08:05

## 2025-05-28 RX ADMIN — ACETAMINOPHEN 650 MG: 325 TABLET ORAL at 08:05

## 2025-05-28 RX ADMIN — POTASSIUM CHLORIDE 10 MEQ: 7.46 INJECTION, SOLUTION INTRAVENOUS at 02:05

## 2025-05-28 RX ADMIN — DOXYCYCLINE HYCLATE 100 MG: 100 TABLET, COATED ORAL at 10:05

## 2025-05-28 RX ADMIN — MAGNESIUM SULFATE HEPTAHYDRATE 2 G: 40 INJECTION, SOLUTION INTRAVENOUS at 12:05

## 2025-05-28 RX ADMIN — MIRTAZAPINE 7.5 MG: 7.5 TABLET, FILM COATED ORAL at 10:05

## 2025-05-28 RX ADMIN — BISACODYL 10 MG: 5 TABLET, COATED ORAL at 02:05

## 2025-05-28 RX ADMIN — ATORVASTATIN CALCIUM 80 MG: 40 TABLET, FILM COATED ORAL at 08:05

## 2025-05-28 RX ADMIN — LIDOCAINE HYDROCHLORIDE 40 MG: 20 INJECTION INTRAVENOUS at 12:05

## 2025-05-28 RX ADMIN — ONDANSETRON 8 MG: 4 TABLET, ORALLY DISINTEGRATING ORAL at 01:05

## 2025-05-28 RX ADMIN — POLYETHYLENE GLYCOL 3350, SODIUM SULFATE ANHYDROUS, SODIUM BICARBONATE, SODIUM CHLORIDE, POTASSIUM CHLORIDE 4000 ML: 236; 22.74; 6.74; 5.86; 2.97 POWDER, FOR SOLUTION ORAL at 03:05

## 2025-05-28 RX ADMIN — Medication 6 MG: at 10:05

## 2025-05-28 RX ADMIN — PROPOFOL 20 MG: 10 INJECTION, EMULSION INTRAVENOUS at 12:05

## 2025-05-28 RX ADMIN — POTASSIUM CHLORIDE 10 MEQ: 7.46 INJECTION, SOLUTION INTRAVENOUS at 01:05

## 2025-05-28 RX ADMIN — PANTOPRAZOLE SODIUM 40 MG: 40 INJECTION, POWDER, FOR SOLUTION INTRAVENOUS at 10:05

## 2025-05-28 RX ADMIN — DOXYCYCLINE HYCLATE 100 MG: 100 TABLET, COATED ORAL at 02:05

## 2025-05-28 RX ADMIN — SIMETHICONE 80 MG: 80 TABLET, CHEWABLE ORAL at 11:05

## 2025-05-28 RX ADMIN — IOHEXOL 75 ML: 350 INJECTION, SOLUTION INTRAVENOUS at 12:05

## 2025-05-28 RX ADMIN — ALLOPURINOL 300 MG: 100 TABLET ORAL at 08:05

## 2025-05-28 RX ADMIN — ACETAMINOPHEN 650 MG: 325 TABLET ORAL at 11:05

## 2025-05-28 RX ADMIN — PROPOFOL 100 MCG/KG/MIN: 10 INJECTION, EMULSION INTRAVENOUS at 12:05

## 2025-05-28 RX ADMIN — FLUTICASONE FUROATE AND VILANTEROL TRIFENATATE 1 PUFF: 100; 25 POWDER RESPIRATORY (INHALATION) at 08:05

## 2025-05-28 RX ADMIN — SODIUM CHLORIDE: 9 INJECTION, SOLUTION INTRAVENOUS at 12:05

## 2025-05-28 RX ADMIN — LORAZEPAM 1 MG: 2 INJECTION INTRAMUSCULAR; INTRAVENOUS at 10:05

## 2025-05-28 NOTE — ASSESSMENT & PLAN NOTE
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 There is distal reconstitution   Pending lower extremity arterial and venous ultrasound. Given patient is symptomatic with LLE pain will need IC consult for revascularization? once acute GI bleed issue is resolved

## 2025-05-28 NOTE — ANESTHESIA PREPROCEDURE EVALUATION
"                                                                                                             05/28/2025  Jessica Floyd is a 74 y.o., female.     COPD, HFpEF, CAD, ERIK, history of GIB with small bowel resection/ IR embolization last year presenting with weakness and dark stool.     Patient and  at bedside provide history.      They report that this morning, she had a large-volume episode of fecal incontinence, which was "dark like hot fudge" and very watery. She has intermittent dark stools at baseline, however never this voluminous or severe. She has chronic pre-syncopal episodes and experienced another today while on the toilet.  Throughout the day, she has had significant weakness and fatigue, to the point where she had to be carried to the car.  At baseline, she ambulates with a walker, and sometimes gets around with a wheelchair.  They presented to primary care clinic, and she was noted to be hypotensive, improved with IV fluids.  She was therefore sent to the ED for further evaluation.     She uses oxygen p.r.n. for shortness for breath, but mainly at night.  Pre-op Assessment    I have reviewed the Patient Summary Reports.       I have reviewed the Medications.     Review of Systems  Anesthesia Hx:   History of prior surgery of interest to airway management or planning:            Denies Personal Hx of Anesthesia complications.                    Cardiovascular:     Hypertension   CAD           hyperlipidemia    NL BiV Fxn on 2023 Echo                           Pulmonary:   COPD   Shortness of breath                  Renal/:  Chronic Renal Disease                Hepatic/GI:     GERD                Endocrine:    Hyperthyroidism         Psych:  Psychiatric History anxiety                 Physical Exam  General: Cachexia    Airway:  Mallampati: III / II  Mouth Opening: Normal  TM Distance: Normal  Tongue: Normal  Neck ROM: Normal ROM    Dental:  Periodontal disease    Chest/Lungs:  Normal " Respiratory Rate    Heart:  Rate: Normal        Anesthesia Plan  Type of Anesthesia, risks & benefits discussed:    Anesthesia Type: Gen Natural Airway  Intra-op Monitoring Plan: Standard ASA Monitors  Post Op Pain Control Plan: multimodal analgesia  Induction:  IV  Informed Consent: Informed consent signed with the Patient and all parties understand the risks and agree with anesthesia plan.  All questions answered.   ASA Score: 4  Day of Surgery Review of History & Physical: H&P Update referred to the surgeon/provider.  Anesthesia Plan Notes: HCT improved 16 to 24 following transfusion, feels much better.  Patient extremely frail and deconditioned, anticipate reduced anesthetic requirements.  Plan aggressive use of phenylephrine to maintain BP    Ready For Surgery From Anesthesia Perspective.     .

## 2025-05-28 NOTE — H&P
"[facility] - Emergency Dept  [facility]  History & Physical    Patient Name: Jessica Floyd  MRN: [id number]  Patient Class: IP- Inpatient  Admission Date: 5/27/2025  Attending Physician: Basim Sutherland MD   Primary Care Provider: Jace Valencia MD         Patient information was obtained from patient, past medical records, and ER records.     Subjective:     Principal Problem:Acute GI bleeding    Chief Complaint:   Chief Complaint   Patient presents with    Weakness     Sent by PCP for weakness. Per clinic pt hypotensive on arrival. Pt reports weakness, sob on exertion. Pt arrives on 3L NC at 98%.         HPI: Jessica Floyd is a 74 y.o. female with COPD, HFpEF, CAD, ERIK, history of GIB with small bowel resection/ IR embolization last year presenting with weakness and dark stool.    Patient and  at bedside provide history.     They report that this morning, she had a large-volume episode of fecal incontinence, which was "dark like hot fudge" and very watery. She has intermittent dark stools at baseline, however never this voluminous or severe. She has chronic pre-syncopal episodes and experienced another today while on the toilet.  Throughout the day, she has had significant weakness and fatigue, to the point where she had to be carried to the car.  At baseline, she ambulates with a walker, and sometimes gets around with a wheelchair.  They presented to primary care clinic, and she was noted to be hypotensive, improved with IV fluids.  She was therefore sent to the ED for further evaluation.    She uses oxygen p.r.n. for shortness for breath, but mainly at night.  Denies any NSAID use.   Drinks 1-2 glasses of wine per day.     She denies any abdominal pain, nausea, vomiting, or fever.     Past Medical History:   Diagnosis Date    Allergic rhinitis     Amblyopia     Carotid stenosis     Coronary atherosclerosis     Outside [facility] 6/2014: 20% mLAD, 50% mCx, 20%, mRCA    COVID-19 11/15/2023    Dyslipidemia     " ESBL (extended spectrum beta-lactamase) producing bacteria infection     UTI    Hypertension     Influenza A 12/28/2023    Nausea and vomiting 05/26/2017    Pulmonary emphysema 10/28/2023    SAH (subarachnoid hemorrhage) 08/24/2016 1999, s/p clipping    Subarachnoid hemorrhage due to ruptured aneurysm 1999       Past Surgical History:   Procedure Laterality Date    ADENOIDECTOMY      ANTERIOR CRUCIATE LIGAMENT REPAIR  2012    APPENDECTOMY      CATARACT EXTRACTION W/  INTRAOCULAR LENS IMPLANT Right 11/07/2022    Procedure: EXTRACTION, CATARACT, WITH IOL INSERTION;  Surgeon: Higinio Cristina MD;  Location: [facility];  Service: Ophthalmology;  Laterality: Right;    CATARACT EXTRACTION W/  INTRAOCULAR LENS IMPLANT Left 11/21/2022    Procedure: EXTRACTION, CATARACT, WITH IOL INSERTION;  Surgeon: Higinio Cristina MD;  Location: [facility] OR;  Service: Ophthalmology;  Laterality: Left;    CEREBRAL ANEURYSM REPAIR  1999    clip    COLONOSCOPY N/A 5/16/2024    Procedure: COLONOSCOPY;  Surgeon: Rich Syed MD;  Location: [facility] (2ND FLR);  Service: Endoscopy;  Laterality: N/A;    DENTAL SURGERY      DIAGNOSTIC LAPAROSCOPY N/A 5/17/2024    Procedure: LAPAROSCOPY, DIAGNOSTIC;  Surgeon: Ruben Oscar MD;  Location: [facility] 2ND FLR;  Service: General;  Laterality: N/A;    ESOPHAGOGASTRODUODENOSCOPY N/A 5/15/2024    Procedure: EGD (ESOPHAGOGASTRODUODENOSCOPY);  Surgeon: Rich Syed MD;  Location: [facility] (2ND FLR);  Service: Endoscopy;  Laterality: N/A;    EXCISION, SMALL INTESTINE N/A 5/17/2024    Procedure: EXCISION, SMALL INTESTINE;  Surgeon: Ruben Oscar MD;  Location: [facility] OR 2ND FLR;  Service: General;  Laterality: N/A;    EYE SURGERY Bilateral     cataract removal and lens implants    HIP ARTHROPLASTY Left 05/28/2023    Procedure: TOTAL HIP ARTHROPLASTY;  Surgeon: Juan Wan MD;  [facility];  Service: Orthopedics;  Laterality: Left;    LAPAROTOMY, EXPLORATORY N/A  5/17/2024    Procedure: LAPAROTOMY, EXPLORATORY;  Surgeon: Ruben Oscar MD;  Location: [facility] OR 2ND FLR;  Service: General;  Laterality: N/A;    REPAIR, HERNIA, INGUINAL Bilateral 5/17/2024    Procedure: REPAIR, HERNIA, INGUINAL;  Surgeon: Ruben Oscar MD;  Location: [facility] OR 2ND FLR;  Service: General;  Laterality: Bilateral;    TONSILLECTOMY         Review of patient's allergies indicates:   Allergen Reactions    Hydromorphone Anxiety, Other (See Comments) and Hallucinations     Severe agitation       Current Facility-Administered Medications on File Prior to Encounter   Medication    mupirocin 2 % ointment    tranexamic acid (CYKLOKAPRON) 1,000 mg in sodium chloride 0.9 % 100 mL IVPB (MB+)    tranexamic acid (CYKLOKAPRON) 1,000 mg in sodium chloride 0.9 % 100 mL IVPB (MB+)     Current Outpatient Medications on File Prior to Encounter   Medication Sig    allopurinoL (ZYLOPRIM) 300 MG tablet Take 300 mg by mouth once daily.    amLODIPine (NORVASC) 5 MG tablet Take 1 tablet (5 mg total) by mouth once daily.    atorvastatin (LIPITOR) 80 MG tablet TAKE 1 TABLET BY MOUTH EVERY DAY (Patient taking differently: Take 80 mg by mouth once daily.)    carvediloL (COREG) 6.25 MG tablet Take 1 tablet (6.25 mg total) by mouth 2 (two) times daily.    celecoxib (CELEBREX) 200 MG capsule TAKE 1 CAPSULE BY MOUTH DAILY AS NEEDED FOR PAIN.    colchicine (COLCRYS) 0.6 mg tablet TAKE 1 TABLET (0.6 MG TOTAL) BY MOUTH ONCE DAILY. AS NEEDED FOR GOUT    cyanocobalamin 1,000 mcg/mL injection Inject 1,000 mcg into the muscle every 7 days.    fluticasone propionate (FLONASE) 50 mcg/actuation nasal spray 2 sprays (100 mcg total) by Each Nostril route once daily.    fluticasone-umeclidin-vilanter (TRELEGY ELLIPTA) 200-62.5-25 mcg inhaler Inhale 1 puff into the lungs once daily.    hydroCHLOROthiazide (HYDRODIURIL) 25 MG tablet Take 1 tablet (25 mg total) by mouth once daily.    LIDOcaine 4 % Gel Apply 1 g topically 2 (two) times  daily as needed (foot pain).    mirtazapine (REMERON) 7.5 MG Tab Take 1 tablet (7.5 mg total) by mouth every evening.    montelukast (SINGULAIR) 10 mg tablet Take 10 mg by mouth every evening.    pantoprazole (PROTONIX) 40 MG tablet Take 1 tablet (40 mg total) by mouth 2 (two) times daily.    erythromycin (ROMYCIN) ophthalmic ointment Place into the right eye every evening. Apply 1/2 inch ribbon. (Patient not taking: Reported on 2025)    LIDOcaine-prilocaine (EMLA) cream Apply topically as needed. (Patient not taking: Reported on 2025)    QUEtiapine (SEROQUEL) 50 MG tablet Take 1 tablet (50 mg total) by mouth 2 (two) times daily. (Patient not taking: Reported on 2025)    syringe, disposable, 1 mL Syrg 1 Stick by Misc.(Non-Drug; Combo Route) route once a week.     Family History       Problem Relation (Age of Onset)    Alcohol abuse Father    Aneurysm Mother    Gout Brother    Heart disease Brother    Hypertension Mother, Father    Kidney disease Brother    No Known Problems Daughter, Daughter, Daughter          Tobacco Use    Smoking status: Former     Current packs/day: 0.00     Average packs/day: 0.5 packs/day for 20.0 years (10.0 ttl pk-yrs)     Types: Cigarettes     Start date: 1979     Quit date: 1999     Years since quittin.4     Passive exposure: Past    Smokeless tobacco: Never   Substance and Sexual Activity    Alcohol use: Yes     Alcohol/week: 14.0 standard drinks of alcohol     Types: 14 Glasses of wine per week     Comment: 1 or 2 glasses of red wine    Drug use: No    Sexual activity: Yes     Partners: Male     Review of Systems   All other systems reviewed and are negative.    Objective:     Vital Signs (Most Recent):  Temp: 98.7 °F (37.1 °C) (25)  Pulse: 94 (25)  Resp: 20 (25)  BP: (!) 144/68 (25)  SpO2: (!) 90 % (25) Vital Signs (24h Range):  Temp:  [98.2 °F (36.8 °C)-98.7 °F (37.1 °C)] 98.7 °F (37.1 °C)  Pulse:   [] 94  Resp:  [16-20] 20  SpO2:  [83 %-100 %] 90 %  BP: (112-153)/(52-76) 144/68     Weight: 49.9 kg (110 lb)  Body mass index is 19.49 kg/m².     Physical Exam  Vitals and nursing note reviewed.   Constitutional:       General: She is not in acute distress.     Appearance: She is well-developed. She is not diaphoretic.   HENT:      Head: Normocephalic and atraumatic.   Eyes:      General: No scleral icterus.     Conjunctiva/sclera: Conjunctivae normal.   Neck:      Vascular: No JVD.   Cardiovascular:      Rate and Rhythm: Normal rate and regular rhythm.   Pulmonary:      Effort: Pulmonary effort is normal. No respiratory distress.   Abdominal:      General: There is no distension.   Musculoskeletal:      Right lower leg: No edema.      Left lower leg: No edema.      Comments: Abrasion/ulceration to L ankle without purulence or obvious signs of infection    Skin:     Coloration: Skin is pale. Skin is not jaundiced.   Neurological:      Mental Status: She is alert and oriented to person, place, and time.      Motor: No abnormal muscle tone.   Psychiatric:         Behavior: Behavior normal.      Comments: Mildly anxious but appropriately conversational, easily calmed                Significant Labs: All pertinent labs within the past 24 hours have been reviewed.  CBC:   Recent Labs   Lab 05/27/25 1935 05/27/25  2243   WBC 12.67  --    HGB 5.2*  --    HCT 16.4* 16.5*     --      CMP:   Recent Labs   Lab 05/27/25 1935      K 2.8*      CO2 20*      BUN 39*   CREATININE 0.9   CALCIUM 7.5*   PROT 5.6*   ALBUMIN 2.3*   BILITOT 0.3   ALKPHOS 64   AST 23   ALT 16   ANIONGAP 16       Significant Imaging: I have reviewed all pertinent imaging results/findings within the past 24 hours.  Assessment/Plan:     Assessment & Plan  Acute GI bleeding  Presents with large-volume dark stool, and H/H noted to be significantly decreased from 8 days prior (10.2 > 5.2). Concern for upper GI bleeding. Of  note, has history of GIB requiring IR embolization and ex-lap with partial small bowel resection.   -obtain STAT CTA   -Will place on GI bleed pathway.   Start IV PPI and clear liquid diet with NPO at midnight.   -Consult GI for evaluation and possible endoscopy. Monitor serial H/H, and transfuse to maintain Hgb 7 or greater or for hemodynamic instability. Hold anticoagulation and NSAIDs.   Iron deficiency anemia secondary to blood loss (chronic)  Anemia is likely due to Iron deficiency. Most recent hemoglobin and hematocrit are listed below.  Recent Labs     05/27/25 1935 05/27/25  2243   HGB 5.2*  --    HCT 16.4* 16.5*     Plan  - Monitor serial CBC: as above  - Transfuse PRBC if patient becomes hemodynamically unstable, symptomatic or H/H drops below 7/21.  - Patient will receive 2U RBC  Abrasion  Abrasion noted to L ankle. Does not appear actively infected. Arterial US pending. Monitor.     Coronary artery disease involving native coronary artery of native heart without angina pectoris  Patient with known CAD, which is controlled Will continue Statin, holding aspirin given GI bleed, and monitor for S/Sx of angina/ACS. Continue to monitor on telemetry.   Gastroesophageal reflux disease without esophagitis  Continue PPI as above.    Hypokalemia  Patient's most recent potassium results are listed below.   Recent Labs     05/27/25 1935   K 2.8*     Plan  - Replete potassium per protocol  - Monitor potassium Daily  - Replete Mg as well   Hypertension  Hold home antihypertensives in the setting of initial hypotension with GI bleed.  Pulmonary emphysema  History of COPD, not on home oxygen. Currently without evidence of acute exacerbation, and sats on room air at goal 88% or greater. Will continue hospital formulary equivalent of home regimen and monitor respiratory status.   (HFpEF) heart failure with preserved ejection fraction  Patient with known diastolic CHF. Currently without evidence of volume overload on  exam, and not in acute exacerbation. BNP lower than prior. Will hold home antihypertensives given GIB and initial hypotension.  Appropriate diet ordered. Monitor volume status.         Hypomagnesemia  Patient has Abnormal Magnesium: hypomagnesemia. Will continue to monitor electrolytes closely. Will replace the affected electrolytes and repeat labs to be done after interventions completed. The patient's magnesium results have been reviewed and are listed below.  Recent Labs   Lab 05/27/25  2146   MG 1.2*      VTE Risk Mitigation (From admission, onward)           Ordered     IP VTE HIGH RISK PATIENT  Once         05/28/25 0054     Place sequential compression device  Until discontinued         05/28/25 0054     Reason for No Pharmacological VTE Prophylaxis  Once        Question:  Reasons:  Answer:  Active Bleeding    05/28/25 0054     Place sequential compression device  Until discontinued         05/27/25 2231                     Advance Care Planning   I spent less than 16 minutes discussing advance care planning.   Patient is FULL CODE on discussion with her.  Patient's surrogate decision maker is her .                  Harsha Lora MD  Department of Hospital Medicine  [facility] - Emergency Dept

## 2025-05-28 NOTE — TREATMENT PLAN
Brief GI Treatment Plan    EGD today unrevealing. See Provation report for further details.     PLAN:  - Device assisted colonoscopy tomorrow  - Clears today, NPO @ 5AM tomorrow.   - 1/2 gallon prep between 6-8pm (8oz q15min)  - 1/2 gallon prep between 4-6am (8oz until complete)    Discussed with patient and daughter. We will follow.     Gabriel Mckinney MD  PGY-VI, GI/Hepatology

## 2025-05-28 NOTE — NURSING TRANSFER
Nursing Transfer Note      5/28/2025   1:59 PM    Nurse giving handoff:Francine  Nurse receiving handoff:Francine    Reason patient is being transferred: admit    Transfer To: ED 38    Transfer via stretcher    Transfer with 2L to O2    Transported by Tech    Telemetry:   Order for Tele Monitor?     Additional Lines:     Medicines sent: na    Any special needs or follow-up needed: na    Patient belongings transferred with patient: Yes    Chart send with patient: Yes    Notified: daughter

## 2025-05-28 NOTE — PLAN OF CARE
Pt's nurse was at bedside. Sw was unable to complete assessment       Kacey Broussard LMSW  Case Management  Emergency Department  [phone]

## 2025-05-28 NOTE — ASSESSMENT & PLAN NOTE
Patient has Abnormal Magnesium: hypomagnesemia. Will continue to monitor electrolytes closely. Will replace the affected electrolytes and repeat labs to be done after interventions completed. The patient's magnesium results have been reviewed and are listed below.  Recent Labs   Lab 05/27/25  2146   MG 1.2*

## 2025-05-28 NOTE — ED NOTES
Assumed care of the patient. Report received from Olga. Pt on continuous cardiac monitoring, continuous pulse oximetry, and automatic BP cuff cycling Q15min. Pt in hospital gown, side rails up X2, bed low and locked, and call light is placed within reach. 1 family/visitors at bedside at this time. Pt states she is went.  Pt. Cleaned, new pure wick applied, new bed pad applied.  Pt repositioned for comfort. VSS.     Jessica Floyd, a 74 y.o. female presents to the ED w/ complaint of generalized weakness.

## 2025-05-28 NOTE — ED NOTES
Pt refusing to change into hospital gown at this time. Stating she would rather stay in her own clothes d/t being cold. Nurse provided pt with blankets and heating packs.

## 2025-05-28 NOTE — ED TRIAGE NOTES
Jessica Floyd, a 74 y.o. female presents to the ED w/ complaint of increased weakness. Was hypotensive in the clinic today. On 2L NC 94%    Triage note:  Chief Complaint   Patient presents with    Weakness     Sent by PCP for weakness. Per clinic pt hypotensive on arrival. Pt reports weakness, sob on exertion. Pt arrives on 3L NC at 98%.      Review of patient's allergies indicates:   Allergen Reactions    Hydromorphone Anxiety, Other (See Comments) and Hallucinations     Severe agitation     Past Medical History:   Diagnosis Date    Allergic rhinitis     Amblyopia     Carotid stenosis     Coronary atherosclerosis     Outside [facility] 6/2014: 20% mLAD, 50% mCx, 20%, mRCA    COVID-19 11/15/2023    Dyslipidemia     ESBL (extended spectrum beta-lactamase) producing bacteria infection     UTI    Hypertension     Influenza A 12/28/2023    Nausea and vomiting 05/26/2017    Pulmonary emphysema 10/28/2023    SAH (subarachnoid hemorrhage) 08/24/2016 1999, s/p clipping    Subarachnoid hemorrhage due to ruptured aneurysm 1999

## 2025-05-28 NOTE — ASSESSMENT & PLAN NOTE
Patient with known diastolic CHF. Currently without evidence of volume overload on exam, and not in acute exacerbation. BNP lower than prior. Will hold home antihypertensives given GIB and initial hypotension.  Appropriate diet ordered. Monitor volume status.

## 2025-05-28 NOTE — PROVATION PATIENT INSTRUCTIONS
Discharge Summary/Instructions after an Endoscopic Procedure  Patient Name: Jessica Floyd  Patient MRN: [id number]  Patient YOB: 1950  Wednesday, May 28, 2025  Rich Syed MD  Dear patient,  As a result of recent federal legislation (The Federal Cures Act), you may   receive lab or pathology results from your procedure in your [facility]   account before your physician is able to contact you. Your physician or   their representative will relay the results to you with their   recommendations at their soonest availability.  Thank you,  RESTRICTIONS:  During your procedure today, you received medications for sedation.  These   medications may affect your judgment, balance and coordination.  Therefore,   for 24 hours, you have the following restrictions:   - DO NOT drive a car, operate machinery, make legal/financial decisions,   sign important papers or drink alcohol.    ACTIVITY:  Today: no heavy lifting, straining or running due to procedural   sedation/anesthesia.  The following day: return to full activity including work.  DIET:  Eat and drink normally unless instructed otherwise.     TREATMENT FOR COMMON SIDE EFFECTS:  - Mild abdominal pain, nausea, belching, bloating or excessive gas:  rest,   eat lightly and use a heating pad.  - Sore Throat: treat with throat lozenges and/or gargle with warm salt   water.  - Because air was used during the procedure, expelling large amounts of air   from your rectum or belching is normal.  - If a bowel prep was taken, you may not have a bowel movement for 1-3 days.    This is normal.  SYMPTOMS TO WATCH FOR AND REPORT TO YOUR PHYSICIAN:  1. Abdominal pain or bloating, other than gas cramps.  2. Chest pain.  3. Back pain.  4. Signs of infection such as: chills or fever occurring within 24 hours   after the procedure.  5. Rectal bleeding, which would show as bright red, maroon, or black stools.   (A tablespoon of blood from the rectum is not serious, especially if    hemorrhoids are present.)  6. Vomiting.  7. Weakness or dizziness.  GO DIRECTLY TO THE NEAREST EMERGENCY ROOM IF YOU HAVE ANY OF THE FOLLOWING:      Difficulty breathing              Chills and/or fever over 101 F   Persistent vomiting and/or vomiting blood   Severe abdominal pain   Severe chest pain   Black, tarry stools   Bleeding- more than one tablespoon   Any other symptom or condition that you feel may need urgent attention  Your doctor recommends these additional instructions:  If any biopsies were taken, your doctors clinic will contact you in 1 to 2   weeks with any results.  - Return patient to hospital [facility] for ongoing care.   - Clear liquid diet today.   - Continue present medications.   - Perform a colonoscopy tomorrow. Plan for device-assisted colonoscopy using   the balloon enteroscopy due to prior difficulty.  For questions, problems or results please call your physician - Rich Syed MD at Work:  [phone].  [facility] [place], EMERGENCY ROOM PHONE NUMBER: [phone]  IF A COMPLICATION OR EMERGENCY SITUATION ARISES AND YOU ARE UNABLE TO REACH   YOUR PHYSICIAN - GO DIRECTLY TO THE EMERGENCY ROOM.  Rich Syed MD  5/28/2025 5:27:32 PM  This report has been verified and signed electronically.  Dear patient,  As a result of recent federal legislation (The Federal Cures Act), you may   receive lab or pathology results from your procedure in your [facility]   account before your physician is able to contact you. Your physician or   their representative will relay the results to you with their   recommendations at their soonest availability.  Thank you,  [employer]ATION

## 2025-05-28 NOTE — ASSESSMENT & PLAN NOTE
admitted with dark melanotic stool/presyncope.  Hemoglobin 4.9 on presentation status post 2 unit PRBC transfusion hemoglobin improved to 7.8.  CTA negative for GI bleed.  GI was consulted status post EGD on 05/28/2025 with no evidence of active bleed plan for C scope in a.m.

## 2025-05-28 NOTE — H&P (VIEW-ONLY)
[facility]  Gastroenterology  Consult Note    Patient Name: Jessica Floyd  MRN: [id number]  Admission Date: 5/27/2025  Hospital Length of Stay: 1 days  Code Status: Full Code   Attending Provider: Abner Moncada,*   Consulting Provider: Pamela Dixon MD  Primary Care Physician: Jace Valencia MD  Principal Problem:Acute GI bleeding    Inpatient consult to Gastroenterology  Consult performed by: Pamela Dixon MD  Consult ordered by: Harsha Lora MD        Subjective:     HPI: Jessica Floyd is a 74 y.o. female with history of COPD, HFpEF, CAD, ERIK, hx bleeding from the cecum s/p IR embolization May 2024, incarcerated inguinal hernia s/p SB resection (17 cm) May 2024 presenting to the ED with dark melanotic appearing stool and watery, large volume. GI consulted for suspected upper gi bleed.   Patient with significant anemia, improved after 2 units prbc. Vitally stable. CTA negative for active gi bleeding. EGD done this AM was normal. Agreeable to prep for colonoscopy.  Of note, she has severe anxiety related to hospitalization, medical care. Needed lorazepam prior to egd. Seemed to be calmed down somewhat  thereafter. Granddaughter was at bedside and very helpful.        EGD 5/28  Impression:            - Normal esophagus.                          - Normal gastric body and antrum.                          - Normal examined duodenum.                          - No specimens collected.     ROS     Objective:     Vitals:    05/28/25 1600   BP: 125/60   Pulse: 76   Resp:    Temp:          Constitutional:  not in acute distress and well developed  HENT: Head: Normal, normocephalic, atraumatic.  Eyes: conjunctiva clear and sclera nonicteric  GI: soft, nondistended, and nontender  Skin: mild pallor  Neurological: alert, oriented x3      Significant Labs:  Reviewed the pertinent laboratory tests.    Significant Imaging:  CTA 5/28  Impression:     No intraluminal contrast  extravasation to suggest active arterial hemorrhage in the stomach or intestinal tract.  Note however that assessment is limited due to high-attenuation fecal residue/opaque material within the colonic lumen.     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  There is distal reconstitution.  Severe calcific atherosclerosis of the aorta and its major branches.     Bilateral somewhat striated nephrograms. Recommend correlation with urinalysis for possible pyelonephritis. Other underlying renal pathology, or effects of hypotension, also possible.     Left inguinal region gas-containing fluid-filled structure or collection, with leading differential diagnosis of bowel containing inguinal hernia versus inguinal abscess.  Correlate clinically.     Marked distension of the urinary bladder, correlate for voluntary versus involuntary urinary retention.         Assessment/Plan:     Jessica Floyd is a 74 y.o. female with history of COPD, HFpEF, CAD, ERIK, hx bleeding from the cecum s/p IR embolization May 2024, incarcerated inguinal hernia s/p SB resection (17 cm) May 2024 presenting to the ED with dark melanotic appearing stool and watery, large volume. GI consulted for suspected upper gi bleed. EGD normal on 5/28.    Problem List:  Probable lower gi bleed with negative egd on 5/28  Acute on chronic anemia ; iron deficiency, acute blood loss      Recommendations:  - clear liquid diet today  - golytely prep ordered for this evening  - npo midnight for colonoscopy tomorrow with balloon device assistance given difficulty of last colonoscopy 2022     Thank you for involving us in the care of Jessica Floyd. Please call with any additional questions, concerns or changes in the patient's clinical status. We will continue to follow.     Pamela Dixon MD  Gastroenterology and Hepatology Fellow, PGY-4

## 2025-05-28 NOTE — ASSESSMENT & PLAN NOTE
Patient with known diastolic CHF. Currently without evidence of volume overload on exam, and not in acute exacerbation. BNP lower than prior. Will hold home antihypertensives given GIB and initial hypotension.  Appropriate diet ordered. Monitor volume status.       Anxiety  required a dose of Ativan for anxiety resume home Remeron/Seroquel for anxiety..

## 2025-05-28 NOTE — ASSESSMENT & PLAN NOTE
Patient with known CAD, which is controlled Will continue Statin, holding aspirin given GI bleed, and monitor for S/Sx of angina/ACS. Continue to monitor on telemetry.

## 2025-05-28 NOTE — ASSESSMENT & PLAN NOTE
Presents with large-volume dark stool, and H/H noted to be significantly decreased from 8 days prior (10.2 > 5.2). Concern for upper GI bleeding. Of note, has history of GIB requiring IR embolization and ex-lap with partial small bowel resection.   -obtain STAT CTA   -Will place on GI bleed pathway.   Start IV PPI and clear liquid diet with NPO at midnight.   -Consult GI for evaluation and possible endoscopy. Monitor serial H/H, and transfuse to maintain Hgb 7 or greater or for hemodynamic instability. Hold anticoagulation and NSAIDs.

## 2025-05-28 NOTE — ASSESSMENT & PLAN NOTE
Patient's most recent potassium results are listed below.   Recent Labs     05/27/25  1935 05/28/25  0345   K 2.8* 4.4     Plan  - Replete potassium per protocol  - Monitor potassium Daily  - Replete Mg as well

## 2025-05-28 NOTE — ASSESSMENT & PLAN NOTE
Patient has Abnormal Magnesium: hypomagnesemia. Will continue to monitor electrolytes closely. Will replace the affected electrolytes and repeat labs to be done after interventions completed. The patient's magnesium results have been reviewed and are listed below.  Recent Labs   Lab 05/28/25  0345   MG 2.1

## 2025-05-28 NOTE — ED NOTES
Bed: [facility]  Expected date:   Expected time:   Means of arrival:   Comments:

## 2025-05-28 NOTE — ED PROVIDER NOTES
Encounter Date: 5/27/2025       History     Chief Complaint   Patient presents with    Weakness     Sent by PCP for weakness. Per clinic pt hypotensive on arrival. Pt reports weakness, sob on exertion. Pt arrives on 3L NC at 98%.      74-year-old female with a past medical history of carotid stenosis, ESBL UTI, nausea and vomiting, emphysema, subarachnoid hemorrhage status post clipping presents with a chief complaint of hypotension.  The patient was seen in clinic today and was noted to have systolic pressures in the 60s.  According to documentation, she received IV fluids and was sent to the emergency department to be evaluated.  Her the patient is most concerned about a wound on her left ankle.  She says it began as a blister about a week ago and has progressed to the point that it is now.  The patient also notes that she has struggled with GI issues for a long time now.  She says that she frequently has to take Pepto-Bismol for indigestion and diarrhea and she took it today but still had to large dark colored episodes of diarrhea.  She says she has significant left leg pain and was having trouble walking down the stairs to get into the car to go to her appointment today.  She is denying any chest pain or shortness of breath, abdominal pain nausea or vomiting or dysuria.    The history is provided by the patient. No  was used.     Review of patient's allergies indicates:   Allergen Reactions    Hydromorphone Anxiety, Other (See Comments) and Hallucinations     Severe agitation     Past Medical History:   Diagnosis Date    Allergic rhinitis     Amblyopia     Carotid stenosis     Coronary atherosclerosis     [facility] 6/2014: 20% mLAD, 50% mCx, 20%, mRCA    COVID-19 11/15/2023    Dyslipidemia     ESBL (extended spectrum beta-lactamase) producing bacteria infection     UTI    Hypertension     Influenza A 12/28/2023    Nausea and vomiting 05/26/2017    Pulmonary emphysema 10/28/2023    SAH  (subarachnoid hemorrhage) 08/24/2016 1999, s/p clipping    Subarachnoid hemorrhage due to ruptured aneurysm 1999     Past Surgical History:   Procedure Laterality Date    ADENOIDECTOMY      ANTERIOR CRUCIATE LIGAMENT REPAIR  2012    APPENDECTOMY      CATARACT EXTRACTION W/  INTRAOCULAR LENS IMPLANT Right 11/07/2022    Procedure: EXTRACTION, CATARACT, WITH IOL INSERTION;  Surgeon: Higinio Cristina MD;  Location: [facility];  Service: Ophthalmology;  Laterality: Right;    CATARACT EXTRACTION W/  INTRAOCULAR LENS IMPLANT Left 11/21/2022    Procedure: EXTRACTION, CATARACT, WITH IOL INSERTION;  Surgeon: Higinio Cristina MD;  Location: [facility] OR;  Service: Ophthalmology;  Laterality: Left;    CEREBRAL ANEURYSM REPAIR  1999    clip    COLONOSCOPY N/A 5/16/2024    Procedure: COLONOSCOPY;  Surgeon: Rich Syed MD;  Location: [facility] (2ND FLR);  Service: Endoscopy;  Laterality: N/A;    DENTAL SURGERY      DIAGNOSTIC LAPAROSCOPY N/A 5/17/2024    Procedure: LAPAROSCOPY, DIAGNOSTIC;  Surgeon: Ruben Oscar MD;  [facility];  Service: General;  Laterality: N/A;    ESOPHAGOGASTRODUODENOSCOPY N/A 5/15/2024    Procedure: EGD (ESOPHAGOGASTRODUODENOSCOPY);  Surgeon: Rich Syed MD;  Location: [facility] (2ND FLR);  Service: Endoscopy;  Laterality: N/A;    EXCISION, SMALL INTESTINE N/A 5/17/2024    Procedure: EXCISION, SMALL INTESTINE;  Surgeon: Ruben Oscar MD;  [facility];  Service: General;  Laterality: N/A;    EYE SURGERY Bilateral     cataract removal and lens implants    HIP ARTHROPLASTY Left 05/28/2023    Procedure: TOTAL HIP ARTHROPLASTY;  Surgeon: Juan Wan MD;  Location: [facility] OR 2ND FLR;  Service: Orthopedics;  Laterality: Left;    LAPAROTOMY, EXPLORATORY N/A 5/17/2024    Procedure: LAPAROTOMY, EXPLORATORY;  Surgeon: Ruben Oscar MD;  Location: [facility] OR 2ND FLR;  Service: General;  Laterality: N/A;    REPAIR, HERNIA, INGUINAL Bilateral 5/17/2024     Procedure: REPAIR, HERNIA, INGUINAL;  Surgeon: Ruben Oscar MD;  Location: [facility] OR [address] [place] [address];  Service: General;  Laterality: Bilateral;    TONSILLECTOMY       Family History   Problem Relation Name Age of Onset    Hypertension Mother      Aneurysm Mother      Hypertension Father      Alcohol abuse Father      Kidney disease Brother      Heart disease Brother      Gout Brother      No Known Problems Daughter Hannah     No Known Problems Daughter Aby     No Known Problems Daughter Diya      Social History[1]  Review of Systems    Physical Exam     Initial Vitals   BP Pulse Resp Temp SpO2   05/27/25 1610 05/27/25 1610 05/27/25 1610 05/27/25 2117 05/27/25 1610   136/73 84 20 98.2 °F (36.8 °C) 100 %      MAP       --                Physical Exam    Nursing note and vitals reviewed.  Constitutional: She appears well-developed and well-nourished. She is not diaphoretic. No distress.   HENT:   Head: Normocephalic.   Eyes: Pupils are equal, round, and reactive to light.   Neck: Neck supple.   Cardiovascular:  Normal rate, regular rhythm, normal heart sounds and intact distal pulses.           Pulmonary/Chest: Breath sounds normal. She has no wheezes. She has no rhonchi. She has no rales.   Abdominal: Abdomen is soft. There is no abdominal tenderness. There is no rebound and no guarding.   Musculoskeletal:         General: Tenderness present. No edema. Normal range of motion.      Cervical back: Neck supple.      Comments: There is tenderness to the dorsum of the left foot and the lateral malleolus, there was no erythema or fluctuance or heat     Neurological: She is alert and oriented to person, place, and time. She has normal strength.   Strength and sensation are equal and preserved in bilateral lower extremities   Skin: Skin is warm and dry. Capillary refill takes less than 2 seconds.   There is a 2 cm ulceration over the left lateral malleolus, there was no drainage or foul smell   Psychiatric: She has a  normal mood and affect. Her behavior is normal. Judgment and thought content normal.         ED Course   Procedures  Labs Reviewed   COMPREHENSIVE METABOLIC PANEL - Abnormal       Result Value    Sodium 139      Potassium 2.8 (*)     Chloride 103      CO2 20 (*)     Glucose 108      BUN 39 (*)     Creatinine 0.9      Calcium 7.5 (*)     Protein Total 5.6 (*)     Albumin 2.3 (*)     Bilirubin Total 0.3      ALP 64      AST 23      ALT 16      Anion Gap 16      eGFR >60     TROPONIN I HIGH SENSITIVITY - Abnormal    Troponin High Sensitive 52 (*)    B-TYPE NATRIURETIC PEPTIDE - Abnormal     (*)    CBC WITH DIFFERENTIAL - Abnormal    WBC 12.67      RBC 1.67 (*)     HGB 5.2 (*)     HCT 16.4 (*)     MCV 98      MCH 31.1 (*)     MCHC 31.7 (*)     RDW 17.9 (*)     Platelet Count 438      MPV 10.5      Nucleated RBC 0     URINALYSIS, REFLEX TO URINE CULTURE - Abnormal    Color, UA Yellow      Appearance, UA Clear      pH, UA 6.0      Spec Grav UA 1.010      Protein, UA Negative      Glucose, UA Negative      Ketones, UA Negative      Bilirubin, UA Negative      Blood, UA 2+ (*)     Nitrites, UA Negative      Urobilinogen, UA Negative      Leukocyte Esterase, UA 2+ (*)    MANUAL DIFFERENTIAL - Abnormal    Gran # (ANC) 10.5      Segmented Neutrophil % 83.0 (*)     Lymphocyte % 11.0 (*)     Monocyte % 6.0      Platelet Estimate Appears Normal      Anisocytosis Slight      Poik Slight      Polychromasia Occasional      Hypochromia Moderate      Ovalocytes Occasional      Target Cell Occasional     TROPONIN I HIGH SENSITIVITY - Abnormal    Troponin High Sensitive 54 (*)    IRON AND TIBC - Abnormal    Iron Level 21 (*)     Transferrin 203      Iron Binding Capacity Total 300      Iron Saturation 7 (*)    VITAMIN B12 - Abnormal    Vitamin B12 >2,000 (*)    MAGNESIUM - Abnormal    Magnesium  1.2 (*)    CBC WITH DIFFERENTIAL - Abnormal    WBC 11.58      RBC 1.62 (*)     HGB 4.9 (*)     HCT 16.2 (*)      (*)     MCH 30.2       MCHC 30.2 (*)     RDW 18.2 (*)     Platelet Count 369      MPV 10.6      Nucleated RBC 0     URINALYSIS MICROSCOPIC - Abnormal    RBC, UA 4      WBC, UA 12 (*)     Bacteria, UA Rare      Squamous Epithelial Cells, UA 1      Microscopic Comment       BASIC METABOLIC PANEL - Abnormal    Sodium 136      Potassium 4.4      Chloride 104      CO2 22 (*)     Glucose 92      BUN 28 (*)     Creatinine 0.7      Calcium 7.5 (*)     Anion Gap 10      eGFR >60     MANUAL DIFFERENTIAL - Abnormal    Gran # (ANC) 9.1      Segmented Neutrophil % 77.0 (*)     Bands % 1.5      Lymphocyte % 14.0 (*)     Monocyte % 7.0      Eosinophil % 0.5      Platelet Estimate Appears Normal      Anisocytosis Slight      Polychromasia Occasional      Hypochromia Occasional      Basophilic Stippling Occasional     CBC WITH DIFFERENTIAL - Abnormal    WBC 13.47 (*)     RBC 2.59 (*)     HGB 7.8 (*)     HCT 24.7 (*)     MCV 95      MCH 30.1      MCHC 31.6 (*)     RDW 18.3 (*)     Platelet Count 361      MPV 10.2      Nucleated RBC 0      Neut % 54.4      Lymph % 10.2 (*)     Mono % 27.8 (*)     Eos % 1.0      Basophil % 1.9      Imm Grans % 4.7 (*)     Neut # 7.34      Lymph # 1.37      Mono # 3.74 (*)     Eos # 0.14      Baso # 0.25 (*)     Imm Grans # 0.63 (*)     Narrative:     This is an appended report.  These results have been appended to a previously verified report.   CULTURE, BLOOD - Normal    Blood Culture No Growth After 6 Hours     CULTURE, BLOOD - Normal    Blood Culture No Growth After 6 Hours     FERRITIN - Normal    Ferritin 233.0     FOLATE - Normal    Folate Level 9.6     MAGNESIUM - Normal    Magnesium  2.1     CULTURE, URINE   CBC W/ AUTO DIFFERENTIAL    Narrative:     The following orders were created for panel order CBC auto differential.  Procedure                               Abnormality         Status                     ---------                               -----------         ------                     CBC with  Differential[[id number]]       Abnormal            Final result               Manual Differential[[id number]]         Abnormal            Final result                 Please view results for these tests on the individual orders.   CBC W/ AUTO DIFFERENTIAL    Narrative:     The following orders were created for panel order CBC auto differential.  Procedure                               Abnormality         Status                     ---------                               -----------         ------                     CBC with Differential[[id number]]       Abnormal            Final result               Manual Differential[[id number]]         Abnormal            Final result                 Please view results for these tests on the individual orders.   GREY TOP URINE HOLD    Extra Tube Hold for add-ons.     MORPHOLOGY    Platelet Estimate Appears Normal      Anisocytosis Slight      Polychromasia Occasional      Hypochromia Occasional      Basophilic Stippling Occasional     CBC W/ AUTO DIFFERENTIAL    Narrative:     The following orders were created for panel order CBC auto differential.  Procedure                               Abnormality         Status                     ---------                               -----------         ------                     CBC with Differential[[id number]]       Abnormal            Final result                 Please view results for these tests on the individual orders.   CBC W/ AUTO DIFFERENTIAL    Narrative:     The following orders were created for panel order CBC auto differential.  Procedure                               Abnormality         Status                     ---------                               -----------         ------                     CBC with Differential[[id number]]                                                        Please view results for these tests on the individual orders.   CBC WITH DIFFERENTIAL   TYPE & SCREEN    Specimen Outdate 05/30/2025  23:59      Group & Rh A POS      Indirect Gerardo NEG     ISTAT LACTATE    POC Lactate 0.70      Sample VENOUS      Site Other      Allens Test N/A     PREPARE RBC SOFT    UNIT NUMBER [id number]      UNIT ABO/RH A POS      DISPENSE STATUS Issued      Unit Expiration [id number]      Product Code E4957R09      Unit Blood Type Code 6200      CROSSMATCH INTERPRETATION Compatible      UNIT NUMBER [id number]      UNIT ABO/RH A POS      DISPENSE STATUS Issued      Unit Expiration [id number]      Product Code X0466X58      Unit Blood Type Code 6200      CROSSMATCH INTERPRETATION Compatible          ECG Results              EKG 12-lead (Final result)        Collection Time Result Time QRS Duration OHS QTC Calculation    05/27/25 19:15:55 05/28/25 10:40:28 88 452                     Final result by Interface, Lab In [facility] (05/28/25 10:40:33)                   Narrative:    Test Reason : R06.02,    Vent. Rate :  97 BPM     Atrial Rate :  97 BPM     P-R Int : 152 ms          QRS Dur :  88 ms      QT Int : 356 ms       P-R-T Axes :  86  74 248 degrees    QTcB Int : 452 ms    Sinus rhythm with Premature supraventricular complexes  Low septal forces  ST and T wave abnormality, consider inferolateral ischemia  Abnormal ECG  When compared with ECG of 27-May-2025 15:25,  Premature ventricular complexes are no longer Present  Premature supraventricular complexes are now Present  Low septal forces is now Present  ST now depressed in Inferior leads  Inverted T waves have replaced nonspecific T wave abnormality in Inferior  leads  Confirmed by Brody Palacios ([id number]) on 5/28/2025 10:40:25 AM    Referred By: AAAREFERRAL SELF           Confirmed By: Brody Palacios                                  Imaging Results              US Lower Extremity Arteries Left (In process)                      US Lower Extremity Veins Left (In process)                       CTA Acute GI Bleed, Abdomen and Pelvis (Final result)  Result time 05/28/25  02:24:27      Final result by Edward Strong MD (05/28/25 02:24:27)                   Impression:      No intraluminal contrast extravasation to suggest active arterial hemorrhage in the stomach or intestinal tract.  Note however that assessment is limited due to high-attenuation fecal residue/opaque material within the colonic lumen.    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  There is distal reconstitution.  Severe calcific atherosclerosis of the aorta and its major branches.    Bilateral somewhat striated nephrograms. Recommend correlation with urinalysis for possible pyelonephritis. Other underlying renal pathology, or effects of hypotension, also possible.    Left inguinal region gas-containing fluid-filled structure or collection, with leading differential diagnosis of bowel containing inguinal hernia versus inguinal abscess.  Correlate clinically.    Marked distension of the urinary bladder, correlate for voluntary versus involuntary urinary retention.    This report was flagged in Epic as abnormal.    Elder Cheney MD discussed these findings with Brooke Wu MD by phone at 01:32 05/28/2025    This final report has been modified from the preliminary report.  Please review this final report for changes.  The preliminary report does remain available in the Epic Chart.    Edward Strong MD, sent notification detailing the differences between the preliminary and final reports to Brooke Wu MD, via Epic Secure Chat message, on 05/28/2025 at 02:24.  Patient name and medical record number were specified/linked in the message.    Electronically signed by resident: Elder Cheney  Date:    05/28/2025  Time:    00:58    Electronically signed by: Edward Strong  Date:    05/28/2025  Time:    02:24               Narrative:    EXAMINATION:  CTA ACUTE GI BLEED, ABDOMEN AND PELVIS    CLINICAL HISTORY:  GIB    TECHNIQUE:  Initial noncontrast  images were obtained of  the abdomen and pelvis.  CT axial angiography images were then obtained from the lung bases to the pubic symphysis following the intravenous administration of 75 mL of Omnipaque 350with delayed images obtained per GI bleeding protocol.  Sagittal and coronal reformats were provided.    COMPARISON:  CT chest 10/23/2024, CT abdomen pelvis 07/04/2024    FINDINGS:  Lung bases: Emphysematous changes of the lungs.  Bibasilar dependent atelectasis.  No consolidation, pleural effusion, mass, or pneumothorax.   There remains a previously demonstrated small, fat-containing right Bochdalek hernia.    Heart: Normal in size.  Severe calcific atherosclerosis.  Partially visualized valvular calcifications.  Hypoattenuation of the blood pool suggesting anemia.    Liver: Hepatomegaly measuring up to 17.8 cm craniocaudal.  Simple cyst in the left lobe of the liver (series 2, image 48).    Gallbladder: Distended.  No pericholecystic inflammatory change.  No stones.    Bile Ducts: No intra or extrahepatic biliary ductal dilation.    Pancreas: No pancreatic mass lesion or peripancreatic inflammatory change.    Spleen: Unremarkable.    Adrenals: Unremarkable.    Kidneys/ Ureters: Normal in size and location. Bilateral striated nephrogram.  Multiple simple cysts bilaterally.  Additional subcentimeter hypodensities too small to fully characterize..  No hydroureteronephrosis.    Bladder: Bladder is significantly distended.    Reproductive organs: Uterus is unremarkable.  No adnexal mass.    GI Tract/Mesentery: The stomach is unremarkable.  Visualized loops of small and large bowel are normal in caliber without evidence for obstruction or inflammation. High-density stool within the colon limits evaluation for GI bleeding.  No intraluminal contrast extravasation to suggest active hemorrhage.    Peritoneum/Retroperitoneum: No abdominopelvic ascites or free air.    Lymph nodes: No significant adenopathy.    Abdominal wall/extraperitoneal soft  tissues: Gas containing fat fluid collection or fluid-filled structure in and inferior to the region of left inguinal surgical changes..  Artifacts from the left hip arthroplasty degrade images through this region.    Vasculature: There is severe calcific atherosclerosis of the abdominal aorta and its major branches.  There is occlusion of the left common iliac artery and left external iliac artery.  Left internal iliac artery reconstitutes distally via collateral vessels.  Left femoral artery reconstitute distally via collateral vessels.  Portal vein, SMV, splenic vein are patent.    Bones: Remote compression fractures of T12 and L1, unchanged.  Grade 1 L3-L4 and L4-L5 anterolisthesis, likely on the basis of lumbar facet disease; no associated spondylolysis is demonstrated.  Advanced degenerative changes of the spine, including multilevel lumbar Baastrup's disease.  Multilevel Schmorl's nodes.  Left hip total arthroplasty, incompletely visualized.  No acute fracture osseous destructive lesion in the visualized skeleton.                        Preliminary result by Elder Cheney MD (05/28/25 01:32:38)                   Impression:      No intraluminal contrast extravasation to suggest active arterial hemorrhage.  Note exam is limited due to high density stool within the colon.    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  There is distal reconstitution.  Severe calcific atherosclerosis of the aorta and its major branches.    Bilateral striated nephrograms.  Recommend correlation with urinalysis for pyelonephritis.    Marked distension of the bladder, correlate for urinary retention.    This report was flagged in Epic as abnormal.    Elder Cheney MD discussed these findings with Brooke Wu MD by phone at 01:32 05/28/2025    Electronically signed by resident: Elder Cheney  Date:    05/28/2025  Time:    00:58                 Narrative:    EXAMINATION:  CTA ACUTE GI  BLEED, ABDOMEN AND PELVIS    CLINICAL HISTORY:  GIB;    TECHNIQUE:  Initial noncontrast  images were obtained of the abdomen and pelvis.  CT axial angiography images were then obtained from the lung bases to the pubic symphysis following the intravenous administration of 75 of Omnipaque 350with delayed images obtained per GI bleeding protocol.  Sagittal and coronal reformats were provided.    COMPARISON:  CT chest 10/23/2024, CT abdomen pelvis 07/04/2024    FINDINGS:  Lung bases: Emphysematous changes of the lungs.  Bibasilar dependent atelectasis.  No consolidation, pleural effusion, mass, or pneumothorax.    Heart: Normal in size.  Severe calcific atherosclerosis.  Partially visualized valvular calcifications.  Hypoattenuation of the blood pool suggesting anemia.    Liver: Hepatomegaly measuring up to 17.8 cm craniocaudal.  Simple cyst in the left lobe of the liver (series 2, image 48).    Gallbladder: Distended.  No pericholecystic inflammatory change.  No stones.    Bile Ducts: No intra or extrahepatic biliary ductal dilation.    Pancreas: No pancreatic mass lesion or peripancreatic inflammatory change.    Spleen: Unremarkable.    Adrenals: Unremarkable.    Kidneys/ Ureters: Normal in size and location. Bilateral striated nephrogram.  Multiple simple cysts bilaterally.  Additional subcentimeter hypodensities too small to fully characterize..  No hydroureteronephrosis.    Bladder: Bladder is significantly distended.    Reproductive organs: Uterus is unremarkable.  No adnexal mass.    GI Tract/Mesentery: The stomach is unremarkable.  Visualized loops of small and large bowel are normal in caliber without evidence for obstruction or inflammation. High-density stool within the colon limits evaluation for GI bleeding.  No intraluminal contrast extravasation to suggest active hemorrhage.    Peritoneum/Retroperitoneum: No abdominopelvic ascites or free air.    Lymph nodes: No significant adenopathy.    Abdominal  wall/extraperitoneal soft tissues: Unremarkable.    Vasculature: There is severe calcific atherosclerosis of the abdominal aorta and its major branches.  There is occlusion of the left common iliac artery and left external iliac artery.  Left internal iliac artery reconstitutes distally via collateral vessels.  Left femoral artery reconstitute distally via collateral vessels.  Portal vein, SMV, splenic vein are patent.    Bones: Remote compression fracture of T12, unchanged.  Grade 1 anterolisthesis of L4 on L5.  Advanced degenerative changes of the spine.  Left hip total arthroplasty.  No acute fracture osseous destructive lesion.                                       X-Ray Ankle Complete Left (Final result)  Result time 05/27/25 22:48:29      Final result by Myrna Hameed MD (05/27/25 22:48:29)                   Impression:      No acute osseous abnormality identified.      Electronically signed by: Myrna Hameed MD  Date:    05/27/2025  Time:    22:48               Narrative:    EXAMINATION:  XR ANKLE COMPLETE 3 VIEW LEFT    CLINICAL HISTORY:  Unspecified open wound, unspecified ankle, initial encounter    TECHNIQUE:  AP, lateral and oblique views of the left ankle were performed.    COMPARISON:  June 2024.    FINDINGS:  No evidence of acute displaced fracture, dislocation, or osseous destructive process.  Ankle mortise is maintained.  Soft tissue swelling and edema are seen involving the lower leg and ankle region.                                       X-Ray Chest AP Portable (Final result)  Result time 05/27/25 22:39:30      Final result by Armani Kelley MD (05/27/25 22:39:30)                   Impression:      As above.      Electronically signed by: Armani Kelley MD  Date:    05/27/2025  Time:    22:39               Narrative:    EXAMINATION:  XR CHEST AP PORTABLE    CLINICAL HISTORY:  CHF;    TECHNIQUE:  Single frontal view of the chest was  performed.    COMPARISON:  07/04/2024.    FINDINGS:  Enteric tube removed compared to 2024 exam.    Platelike atelectasis mid right lung field.    Heart and lungs otherwise appear unchanged when allowing for differences in technique and positioning.                                       Medications   0.9%  NaCl infusion (for blood administration) (has no administration in time range)   potassium chloride 10 mEq in 100 mL IVPB (0 mEq Intravenous Stopped 5/28/25 0344)   pantoprazole injection 80 mg (80 mg Intravenous Given 5/27/25 2238)     And   pantoprazole injection 40 mg (40 mg Intravenous Given 5/28/25 0802)   allopurinoL tablet 300 mg (300 mg Oral Given 5/28/25 0801)   fluticasone furoate-vilanteroL 100-25 mcg/dose diskus inhaler 1 puff (1 puff Inhalation Given 5/28/25 0802)   mirtazapine tablet 7.5 mg (7.5 mg Oral Given 5/28/25 0056)   atorvastatin tablet 80 mg (80 mg Oral Given 5/28/25 0801)   sodium chloride 0.9% flush 1-10 mL (has no administration in time range)   melatonin tablet 6 mg (6 mg Oral Given 5/28/25 0106)   ondansetron disintegrating tablet 8 mg (8 mg Oral Given 5/28/25 0106)   polyethylene glycol packet 17 g (has no administration in time range)   simethicone chewable tablet 80 mg (has no administration in time range)   aluminum-magnesium hydroxide-simethicone 200-200-20 mg/5 mL suspension 30 mL (has no administration in time range)   acetaminophen tablet 650 mg (650 mg Oral Given 5/28/25 0813)   naloxone 0.4 mg/mL injection 0.02 mg (has no administration in time range)   glucose chewable tablet 16 g (has no administration in time range)   glucose chewable tablet 24 g (has no administration in time range)   glucagon (human recombinant) injection 1 mg (has no administration in time range)   doxycycline tablet 100 mg (100 mg Oral Given 5/28/25 1408)   albuterol-ipratropium 2.5 mg-0.5 mg/3 mL nebulizer solution 3 mL (has no administration in time range)   QUEtiapine tablet 50 mg (has no  administration in time range)   potassium bicarbonate disintegrating tablet 40 mEq (40 mEq Oral Given 5/27/25 2231)   oxyCODONE immediate release tablet 5 mg (5 mg Oral Given 5/28/25 0055)   magnesium sulfate 2g in water 50mL IVPB (premix) (0 g Intravenous Stopped 5/28/25 0255)   iohexoL (OMNIPAQUE 350) injection 75 mL (75 mLs Intravenous Given 5/28/25 0031)   LORazepam injection 1 mg (1 mg Intravenous Given 5/28/25 1027)   bisacodyL EC tablet 10 mg (10 mg Oral Given 5/28/25 1408)   polyethylene glycol (GoLYTELY) solution (4,000 mLs Oral Given 5/28/25 1524)     Medical Decision Making  See ED course for remainder of care    Amount and/or Complexity of Data Reviewed  Labs: ordered. Decision-making details documented in ED Course.  Radiology: ordered.    Risk  Prescription drug management.  Decision regarding hospitalization.               ED Course as of 05/28/25 2116   Tue May 27, 2025   1919 EKG with sinus rhythm, TWI in the inferior and lateral leads, seen on prior lateral leads but last EKG without inferior TWI, no STEMI [NN]   1922 Fluids at clinic for hypotension [NN]   2000 74-year-old female in no acute distress.  Vital signs are all within normal limits, most notably the patient is normotensive.  Physical exam significant for pain and tenderness in the left lower extremity with ulcerated wound concerning for arterial or venous insufficiency, I am unable to palpate a pulse, however the patient has intact sensation and the foot is warm.  Differential includes but is not limited to sepsis versus osteomyelitis versus venous or arterial insufficiency versus GI bleed [BP]   2115 Hemoglobin(!!): 5.2  Consented for blood tx [NN]   2116 Potassium(!): 2.8  Replacement ordrered [NN]   2200 Patient was found to be anemic with a hemoglobin of 5.8.  The family was surprised by this, I counseled them that it can be difficult to discriminate between dark stool from bismuth containing medicines and melena but that she likely  has a GI losses for some time.  The patient was admitted to Hospital Medicine with CT abdomen and pelvis and lower extremity imaging pending. [BP]   2201 Imaging pending at time of change over. Plan to admit for symptomatic anemia requiring blood transfusion.  [NN]      ED Course User Index  [BP] Michael Salazar MD  [NN] Leyla Varghese MD                           Clinical Impression:  Final diagnoses:  [R06.02] Shortness of breath  [Z13.6] Screening for cardiovascular condition  [S91.009A] Ankle wound  [M79.606] Leg pain  [M79.606] Leg pain - ulcer  [D64.9] Anemia, unspecified type          ED Disposition Condition    Admit                       [1]   Social History  Tobacco Use    Smoking status: Former     Current packs/day: 0.00     Average packs/day: 0.5 packs/day for 20.0 years (10.0 ttl pk-yrs)     Types: Cigarettes     Start date: 1979     Quit date: 1999     Years since quittin.4     Passive exposure: Past    Smokeless tobacco: Never   Substance Use Topics    Alcohol use: Yes     Alcohol/week: 14.0 standard drinks of alcohol     Types: 14 Glasses of wine per week     Comment: 1 or 2 glasses of red wine    Drug use: No        Michael Salazar MD  Resident  [phone]

## 2025-05-28 NOTE — PROGRESS NOTES
"[facility] Card[facility] Medicine  Progress Note    Patient Name: Jessica Floyd  MRN: [id number]  Patient Class: IP- Inpatient   Admission Date: 5/27/2025  Length of Stay: 1 days  Attending Physician: Abner Moncada,*  Primary Care Provider: Jace Valencia MD        Subjective     Principal Problem:Acute GI bleeding        HPI:  Jessica Floyd is a 74 y.o. female with COPD, HFpEF, CAD, ERIK, history of GIB with small bowel resection/ IR embolization last year presenting with weakness and dark stool.    Patient and  at bedside provide history.     They report that this morning, she had a large-volume episode of fecal incontinence, which was "dark like hot fudge" and very watery. She has intermittent dark stools at baseline, however never this voluminous or severe. She has chronic pre-syncopal episodes and experienced another today while on the toilet.  Throughout the day, she has had significant weakness and fatigue, to the point where she had to be carried to the car.  At baseline, she ambulates with a walker, and sometimes gets around with a wheelchair.  They presented to primary care clinic, and she was noted to be hypotensive, improved with IV fluids.  She was therefore sent to the ED for further evaluation.    She uses oxygen p.r.n. for shortness for breath, but mainly at night.  Denies any NSAID use.   Drinks 1-2 glasses of wine per day.     She denies any abdominal pain, nausea, vomiting, or fever.     Overview/Hospital Course:   74 y.o. female with history of COPD, HFpEF, CAD, ERIK, hx bleeding from the cecum s/p IR embolization May 2024, incarcerated inguinal hernia s/p SB resection (17 cm) May 2024 who was admitted with dark melanotic stool/presyncope.  Hemoglobin 4.9 on presentation status post 2 unit PRBC transfusion hemoglobin improved to 7.8.  CTA negative for GI bleed.  GI was consulted status post EGD on 05/28/2025 with no evidence of active bleed plan for C scope in a.m. " required a dose of Ativan for anxiety resume home Remeron/Seroquel for anxiety..  Noted to have left ankle wound initiate on doxy.  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 There is distal reconstitution   Pending lower extremity arterial and venous ultrasound. Given patient is symptomatic with LLE pain will need IC consult for revascularization? once acute GI bleed issue is resolved          Review of Systems  Objective:      Vital Signs (Most Recent):  Temp: 97.4 °F (36.3 °C) (05/28/25 1634)  Pulse: 82 (05/28/25 1634)  Resp: 18 (05/28/25 1634)  BP: 124/77 (05/28/25 1634)  SpO2: (!) 93 % (05/28/25 1634) Vital Signs (24h Range):  Temp:  [97.4 °F (36.3 °C)-98.7 °F (37.1 °C)] 97.4 °F (36.3 °C)  Pulse:  [] 82  Resp:  [13-29] 18  SpO2:  [83 %-99 %] 93 %  BP: (107-161)/(57-87) 124/77      Weight: 49.9 kg (110 lb)  Body mass index is 19.49 kg/m².     Intake/Output Summary (Last 24 hours) at 5/28/2025 1751  Last data filed at 5/28/2025 1243      Gross per 24 hour   Intake 2014 ml   Output 800 ml   Net 1214 ml         Physical Exam  Cardiovascular:      Rate and Rhythm: Normal rate.      Pulses: Normal pulses.   Pulmonary:      Effort: No respiratory distress.      Breath sounds: Normal breath sounds. No wheezing.   Abdominal:      General: Bowel sounds are normal. There is no distension.      Palpations: Abdomen is soft.   Musculoskeletal:      Right lower leg: No edema.      Left lower leg: No edema.   Skin:     Findings: Lesion (L ankle wound see media for pciture) present.   Neurological:      Mental Status: She is alert and oriented to person, place, and time. Mental status is at baseline.                   Significant Labs: All pertinent labs within the past 24 hours have been reviewed.     Significant Imaging: I have reviewed all pertinent imaging results/findings within the past 24 hours.     Assessment & Plan  Acute GI  bleeding  admitted with dark melanotic stool/presyncope.  Hemoglobin 4.9 on presentation status post 2 unit PRBC transfusion hemoglobin improved to 7.8.  CTA negative for GI bleed.  GI was consulted status post EGD on 05/28/2025 with no evidence of active bleed plan for C scope in a.m.   Iron deficiency anemia secondary to blood loss (chronic)  Anemia is likely due to Iron deficiency. Most recent hemoglobin and hematocrit are listed below.  Recent Labs     05/27/25  1935 05/27/25  2243 05/27/25  2347 05/28/25  0753   HGB 5.2*  --  4.9* 7.8*   HCT 16.4* 16.5* 16.2* 24.7*     Plan  - Monitor serial CBC: as above  - Transfuse PRBC if patient becomes hemodynamically unstable, symptomatic or H/H drops below 7/21.  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 There is distal reconstitution   Pending lower extremity arterial and venous ultrasound. Given patient is symptomatic with LLE pain will need IC consult for revascularization? once acute GI bleed issue is resolved      Coronary artery disease involving native coronary artery of native heart without angina pectoris  Patient with known CAD, which is controlled Will continue Statin, holding aspirin given GI bleed, and monitor for S/Sx of angina/ACS. Continue to monitor on telemetry.   Gastroesophageal reflux disease without esophagitis  Continue PPI as above.    Hypokalemia  Patient's most recent potassium results are listed below.   Recent Labs     05/27/25  1935 05/28/25  0345   K 2.8* 4.4     Plan  - Replete potassium per protocol  - Monitor potassium Daily  - Replete Mg as well   Hypertension  Hold home antihypertensives in the setting of initial hypotension with GI bleed.  Pulmonary emphysema  History of COPD, not on home oxygen. Currently without evidence of acute exacerbation, and sats on room air at goal 88% or greater. Will  continue hospital formulary equivalent of home regimen and monitor respiratory status.   (HFpEF) heart failure with preserved ejection fraction  Patient with known diastolic CHF. Currently without evidence of volume overload on exam, and not in acute exacerbation. BNP lower than prior. Will hold home antihypertensives given GIB and initial hypotension.  Appropriate diet ordered. Monitor volume status.       Anxiety  required a dose of Ativan for anxiety resume home Remeron/Seroquel for anxiety..   Hypomagnesemia  Patient has Abnormal Magnesium: hypomagnesemia. Will continue to monitor electrolytes closely. Will replace the affected electrolytes and repeat labs to be done after interventions completed. The patient's magnesium results have been reviewed and are listed below.  Recent Labs   Lab 05/28/25  0345   MG 2.1      Body mass index (BMI) 19.9 or less, adult        VTE Risk Mitigation (From admission, onward)           Ordered     IP VTE HIGH RISK PATIENT  Once         05/28/25 0054     Place sequential compression device  Until discontinued         05/28/25 0054     Reason for No Pharmacological VTE Prophylaxis  Once        Question:  Reasons:  Answer:  Active Bleeding    05/28/25 0054     Place sequential compression device  Until discontinued         05/27/25 2231                    Discharge Planning   DEBBIE:      Code Status: Full Code   Medical Readiness for Discharge Date:                            Abner Moncada MD  Department of Hospital Medicine   [facility] - [facility]

## 2025-05-28 NOTE — HPI
"Jessica Floyd is a 74 y.o. female with COPD, HFpEF, CAD, ERIK, history of GIB with small bowel resection/ IR embolization last year presenting with weakness and dark stool.    Patient and  at bedside provide history.     They report that this morning, she had a large-volume episode of fecal incontinence, which was "dark like hot fudge" and very watery. She has intermittent dark stools at baseline, however never this voluminous or severe. She has chronic pre-syncopal episodes and experienced another today while on the toilet.  Throughout the day, she has had significant weakness and fatigue, to the point where she had to be carried to the car.  At baseline, she ambulates with a walker, and sometimes gets around with a wheelchair.  They presented to primary care clinic, and she was noted to be hypotensive, improved with IV fluids.  She was therefore sent to the ED for further evaluation.    She uses oxygen p.r.n. for shortness for breath, but mainly at night.  Denies any NSAID use.   Drinks 1-2 glasses of wine per day.     She denies any abdominal pain, nausea, vomiting, or fever.   "

## 2025-05-28 NOTE — ANESTHESIA POSTPROCEDURE EVALUATION
Anesthesia Post Evaluation    Patient: Jessica Floyd    Procedure(s) Performed: Procedure(s) (LRB):  EGD (ESOPHAGOGASTRODUODENOSCOPY) (N/A)    Final Anesthesia Type: general      Patient location during evaluation: PACU  Patient participation: Yes- Able to Participate  Level of consciousness: awake and alert  Post-procedure vital signs: reviewed and stable  Pain management: adequate  Airway patency: patent    PONV status at discharge: No PONV  Anesthetic complications: no      Cardiovascular status: blood pressure returned to baseline and hemodynamically stable  Respiratory status: unassisted and spontaneous ventilation  Hydration status: euvolemic  Follow-up not needed.              Vitals Value Taken Time   /60 05/28/25 13:32   Temp 36.6 °C (97.9 °F) 05/28/25 13:00   Pulse 75 05/28/25 13:38   Resp 14 05/28/25 13:38   SpO2 92 % 05/28/25 13:38   Vitals shown include unfiled device data.      No case tracking events are documented in the log.      Pain/Paris Score: Pain Rating Prior to Med Admin: 6 (5/28/2025  8:13 AM)  Pain Rating Post Med Admin: 0 (5/28/2025 12:47 PM)  Paris Score: 9 (5/28/2025 12:47 PM)

## 2025-05-28 NOTE — SUBJECTIVE & OBJECTIVE
Interval History:     Review of Systems  Objective:     Vital Signs (Most Recent):  Temp: 97.4 °F (36.3 °C) (05/28/25 1634)  Pulse: 82 (05/28/25 1634)  Resp: 18 (05/28/25 1634)  BP: 124/77 (05/28/25 1634)  SpO2: (!) 93 % (05/28/25 1634) Vital Signs (24h Range):  Temp:  [97.4 °F (36.3 °C)-98.7 °F (37.1 °C)] 97.4 °F (36.3 °C)  Pulse:  [] 82  Resp:  [13-29] 18  SpO2:  [83 %-99 %] 93 %  BP: (107-161)/(57-87) 124/77     Weight: 49.9 kg (110 lb)  Body mass index is 19.49 kg/m².    Intake/Output Summary (Last 24 hours) at 5/28/2025 1751  Last data filed at 5/28/2025 1243  Gross per 24 hour   Intake 2014 ml   Output 800 ml   Net 1214 ml         Physical Exam  Cardiovascular:      Rate and Rhythm: Normal rate.      Pulses: Normal pulses.   Pulmonary:      Effort: No respiratory distress.      Breath sounds: Normal breath sounds. No wheezing.   Abdominal:      General: Bowel sounds are normal. There is no distension.      Palpations: Abdomen is soft.   Musculoskeletal:      Right lower leg: No edema.      Left lower leg: No edema.   Skin:     Findings: Lesion (L ankle wound see media for pciture) present.   Neurological:      Mental Status: She is alert and oriented to person, place, and time. Mental status is at baseline.               Significant Labs: All pertinent labs within the past 24 hours have been reviewed.    Significant Imaging: I have reviewed all pertinent imaging results/findings within the past 24 hours.

## 2025-05-28 NOTE — INTERVAL H&P NOTE
The patient has been examined and the H&P has been reviewed:    Pre-Procedure H and P Addendum    Patient seen and examined.  History and exam unchanged from prior history and physical.      Procedure: EGD  Indication: GI bleed  ASA Class: per anesthesiology  Airway: normal  Neck Mobility: full range of motion  Mallampatti score: per anesthesia  History of anesthesia problems: no  Family history of anesthesia problems: no  Anesthesia Plan: MAC      Active Hospital Problems    Diagnosis  POA    *Acute GI bleeding [K92.2]  Yes     Chronic    Hypomagnesemia [E83.42]  Yes     Chronic    Body mass index (BMI) 19.9 or less, adult [Z68.1]  Yes    Abrasion [T14.8XXA]  Yes     Chronic    (HFpEF) heart failure with preserved ejection fraction [I50.30]  Yes     Chronic    Pulmonary emphysema [J43.9]  Yes     Chronic    Iron deficiency anemia secondary to blood loss (chronic) [D50.0]  Yes     Chronic    Hypertension [I10]  Yes     Chronic    Hypokalemia [E87.6]  Yes     Chronic    Gastroesophageal reflux disease without esophagitis [K21.9]  Yes     Chronic    Coronary artery disease involving native coronary artery of native heart without angina pectoris [I25.10]  Yes     Chronic     Outside [facility] 2014:  mLAD 20%  mCx 50%  mRCA 20%        Resolved Hospital Problems   No resolved problems to display.

## 2025-05-28 NOTE — AI DETERIORATION ALERT
"RAPID RESPONSE NURSE AI ALERT       AI alert received.    Chart Reviewed: 05/27/2025, 11:52 PM    MRN: [id number]  Bed: ED 01/01    Dx: <principal problem not specified>    Jessica Floyd has a past medical history of Allergic rhinitis, Amblyopia, Carotid stenosis, Coronary atherosclerosis, COVID-19, Dyslipidemia, ESBL (extended spectrum beta-lactamase) producing bacteria infection, Hypertension, Influenza A, Nausea and vomiting, Pulmonary emphysema, SAH (subarachnoid hemorrhage), and Subarachnoid hemorrhage due to ruptured aneurysm.    Last VS: BP (!) 142/67   Pulse 100   Temp 98.2 °F (36.8 °C) (Oral)   Resp 20   Ht 5' 3" (1.6 m)   Wt 49.9 kg (110 lb)   SpO2 (!) 91%   Breastfeeding No   BMI 19.49 kg/m²     24H Vital Sign Range:  Temp:  [98.2 °F (36.8 °C)]   Pulse:  []   Resp:  [20]   BP: (112-142)/(52-76)   SpO2:  [90 %-100 %]     Level of Consciousness (AVPU): alert    Recent Labs     05/27/25 1935 05/27/25  2243   WBC 12.67  --    HGB 5.2*  --    HCT 16.4* 16.5*     --        Recent Labs     05/27/25 1935 05/27/25  2146     --    K 2.8*  --      --    CO2 20*  --    BUN 39*  --    CREATININE 0.9  --      --    MG  --  1.2*        Recent Labs     05/27/25  2243   PH 7.396   PCO2 42.5   PO2 18.7*   HCO3 24.7   POCSATURATED 20.4        OXYGEN:  Flow (L/min) (Oxygen Therapy): 2          MEWS score:      Rounding completed with bedside nurse Haroldo for AI Alert reports admitting provider aware of abnormal labs. 2 units pRBCs ordered to be transfused for H/H of 5.2/16.4. Potassium replacement ordered and started. Provider contacted by RRN for Mag replacement. Pt remains hemodynamically stable at this time. No additional concerns verbalized at this time. Instructed to call [phone] for further concerns or assistance.    Geneva Gan RN        "

## 2025-05-28 NOTE — TRANSFER OF CARE
"Anesthesia Transfer of Care Note    Patient: Jessica Floyd    Procedure(s) Performed: Procedure(s) (LRB):  EGD (ESOPHAGOGASTRODUODENOSCOPY) (N/A)    Patient location: PACU    Anesthesia Type: general    Transport from OR: Transported from OR on 2-3 L/min O2 by NC with adequate spontaneous ventilation    Post pain: adequate analgesia    Post assessment: no apparent anesthetic complications and tolerated procedure well    Post vital signs: stable    Level of consciousness: awake and alert    Nausea/Vomiting: no nausea/vomiting    Complications: none    Transfer of care protocol was followed      Last vitals: Visit Vitals  /68 (Patient Position: Lying)   Pulse 81   Temp 37 °C (98.6 °F) (Temporal)   Resp 16   Ht 5' 3" (1.6 m)   Wt 49.9 kg (110 lb)   SpO2 98%   Breastfeeding No   BMI 19.49 kg/m²     "

## 2025-05-28 NOTE — PHARMACY MED REC
"    Admission Medication History     The home medication history was taken by Nichelle Altson.    You may go to "Admission" then "Reconcile Home Medications" tabs to review and/or act upon these items.     The home medication list has been updated by the Pharmacy department.   Please read ALL comments highlighted in yellow.   Please address this information as you see fit.    Feel free to contact us if you have any questions or require assistance.        Medications listed below were obtained from: Patient/family and Analytic software- [employer]  Current Outpatient Medications on File Prior to Encounter   Medication Sig    allopurinoL (ZYLOPRIM) 300 MG tablet Take 300 mg by mouth once daily.        amLODIPine (NORVASC) 5 MG tablet Take 1 tablet (5 mg total) by mouth once daily.      atorvastatin (LIPITOR) 80 MG tablet Take 80 mg by mouth once daily.        carvediloL (COREG) 6.25 MG tablet Take 1 tablet (6.25 mg total) by mouth 2 (two) times daily.      celecoxib (CELEBREX) 200 MG capsule Take 1 capsule by mouth once daily as needed for pain.      colchicine (COLCRYS) 0.6 mg tablet Take 1 tablet by mouth once daily as needed for gout.      cyanocobalamin 1,000 mcg/mL injection Inject 1,000 mcg into the muscle every 7 days.      fluticasone propionate (FLONASE) 50 mcg/actuation nasal spray Instill 2 sprays (100 mcg total) by each nostril route once daily.        fluticasone-umeclidin-vilanter (TRELEGY ELLIPTA) 200-62.5-25 mcg inhaler Inhale 1 puff into the lungs once daily.        hydroCHLOROthiazide (HYDRODIURIL) 25 MG tablet Take 1 tablet (25 mg total) by mouth once daily.      LIDOcaine 4 % Gel Apply 1 g topically 2 (two) times daily as needed for foot pain.      mirtazapine (REMERON) 7.5 MG Tab Take 1 tablet (7.5 mg total) by mouth every evening.      montelukast (SINGULAIR) 10 mg tablet Take 10 mg by mouth every evening.        pantoprazole (PROTONIX) 40 MG tablet Take 1 tablet (40 mg total) by mouth 2 (two) " times daily.      erythromycin (ROMYCIN) ophthalmic ointment Place into the right eye every evening. Apply 1/2 inch ribbon. (Patient not taking: Reported on 5/27/2025)      LIDOcaine-prilocaine (EMLA) cream Apply topically as needed.   (Patient not taking: Reported on 5/27/2025)      QUEtiapine (SEROQUEL) 50 MG tablet Take 1 tablet (50 mg total) by mouth 2 (two) times daily.   (Patient not taking: Reported on 5/27/2025)      syringe, disposable, 1 mL Syrg 1 Stick by Misc.(Non-Drug; Combo Route) route once a week.           Potential issues to be addressed PRIOR TO DISCHARGE  Please discuss with the patient barriers to adherence with medication treatment plans    Nichelle Alston  EXT [phone]              .

## 2025-05-28 NOTE — ASSESSMENT & PLAN NOTE
Patient's most recent potassium results are listed below.   Recent Labs     05/27/25  1935   K 2.8*     Plan  - Replete potassium per protocol  - Monitor potassium Daily  - Replete Mg as well

## 2025-05-28 NOTE — CONSULTS
[facility]  Gastroenterology  Consult Note    Patient Name: Jessica Floyd  MRN: [id number]  Admission Date: 5/27/2025  Hospital Length of Stay: 1 days  Code Status: Full Code   Attending Provider: Abner Moncada,*   Consulting Provider: Pamela Dixon MD  Primary Care Physician: Jace Valencia MD  Principal Problem:Acute GI bleeding    Inpatient consult to Gastroenterology  Consult performed by: Pamela Dixon MD  Consult ordered by: Harsha Lora MD        Subjective:     HPI: Jessica Floyd is a 74 y.o. female with history of COPD, HFpEF, CAD, ERIK, hx bleeding from the cecum s/p IR embolization May 2024, incarcerated inguinal hernia s/p SB resection (17 cm) May 2024 presenting to the ED with dark melanotic appearing stool and watery, large volume. GI consulted for suspected upper gi bleed.   Patient with significant anemia, improved after 2 units prbc. Vitally stable. CTA negative for active gi bleeding. EGD done this AM was normal. Agreeable to prep for colonoscopy.  Of note, she has severe anxiety related to hospitalization, medical care. Needed lorazepam prior to egd. Seemed to be calmed down somewhat  thereafter. Granddaughter was at bedside and very helpful.        EGD 5/28  Impression:            - Normal esophagus.                          - Normal gastric body and antrum.                          - Normal examined duodenum.                          - No specimens collected.     ROS     Objective:     Vitals:    05/28/25 1600   BP: 125/60   Pulse: 76   Resp:    Temp:          Constitutional:  not in acute distress and well developed  HENT: Head: Normal, normocephalic, atraumatic.  Eyes: conjunctiva clear and sclera nonicteric  GI: soft, nondistended, and nontender  Skin: mild pallor  Neurological: alert, oriented x3      Significant Labs:  Reviewed the pertinent laboratory tests.    Significant Imaging:  CTA 5/28  Impression:     No intraluminal contrast  extravasation to suggest active arterial hemorrhage in the stomach or intestinal tract.  Note however that assessment is limited due to high-attenuation fecal residue/opaque material within the colonic lumen.     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  There is distal reconstitution.  Severe calcific atherosclerosis of the aorta and its major branches.     Bilateral somewhat striated nephrograms. Recommend correlation with urinalysis for possible pyelonephritis. Other underlying renal pathology, or effects of hypotension, also possible.     Left inguinal region gas-containing fluid-filled structure or collection, with leading differential diagnosis of bowel containing inguinal hernia versus inguinal abscess.  Correlate clinically.     Marked distension of the urinary bladder, correlate for voluntary versus involuntary urinary retention.         Assessment/Plan:     Jessica Floyd is a 74 y.o. female with history of COPD, HFpEF, CAD, ERIK, hx bleeding from the cecum s/p IR embolization May 2024, incarcerated inguinal hernia s/p SB resection (17 cm) May 2024 presenting to the ED with dark melanotic appearing stool and watery, large volume. GI consulted for suspected upper gi bleed. EGD normal on 5/28.    Problem List:  Probable lower gi bleed with negative egd on 5/28  Acute on chronic anemia ; iron deficiency, acute blood loss      Recommendations:  - clear liquid diet today  - golytely prep ordered for this evening  - npo midnight for colonoscopy tomorrow with balloon device assistance given difficulty of last colonoscopy 2022     Thank you for involving us in the care of Jessica Floyd. Please call with any additional questions, concerns or changes in the patient's clinical status. We will continue to follow.     Pamela Dixon MD  Gastroenterology and Hepatology Fellow, PGY-4

## 2025-05-28 NOTE — ED NOTES
Bed: [facility]  Expected date:   Expected time:   Means of arrival:   Comments:   SUBJECTIVE:    Patient ID: Amna is a 8 Serena Davenport MD

## 2025-05-28 NOTE — ASSESSMENT & PLAN NOTE
Anemia is likely due to Iron deficiency. Most recent hemoglobin and hematocrit are listed below.  Recent Labs     05/27/25  1935 05/27/25  2243 05/27/25  2347 05/28/25  0753   HGB 5.2*  --  4.9* 7.8*   HCT 16.4* 16.5* 16.2* 24.7*     Plan  - Monitor serial CBC: as above  - Transfuse PRBC if patient becomes hemodynamically unstable, symptomatic or H/H drops below 7/21.  - s/p 2U pRBC transfusion on 05/27

## 2025-05-28 NOTE — ED NOTES
Assumed care of pt at this time.    LOC: The patient is awake, alert and aware of environment with an appropriate affect, the patient is oriented x 3 and speaking appropriately.   APPEARANCE: Patient appears comfortable and in no acute distress, patient is clean and well groomed.  SKIN: The skin is warm and dry, color consistent with ethnicity, patient has normal skin turgor and moist mucus membranes. Pt has ulcer to left ankle, pictures/LDA updated.  MUSCULOSKELETAL: Patient moving all extremities spontaneously, no swelling noted. Pt is ambulatory.  RESPIRATORY: Airway is open and patent, respirations are spontaneous, patient has a normal effort and rate, no accessory muscle. Denies SOB, currently on 2L via NC, SATs >95%, no labored breathing noted.  CARDIAC: Pt placed on cardiac monitor. Patient has a normal rate and regular rhythm, no edema noted, capillary refill < 3 seconds.   GASTRO: Soft and non tender to palpation, no distention noted. Pt denies abd pain, N/V.   : Pt denies any pain or frequency with urination. Pt hooked up to pure wick system.  NEURO: Pt opens eyes spontaneously, behavior appropriate to situation, follows commands, facial expression symmetrical, bilateral hand grasp equal and even, purposeful motor response noted, normal sensation in all extremities when touched with a finger.

## 2025-05-28 NOTE — HOSPITAL COURSE
74 y.o. female with history of COPD, HFpEF, CAD, ERIK, hx bleeding from the cecum s/p IR embolization May 2024, incarcerated inguinal hernia s/p SB resection (17 cm) May 2024 who was admitted with dark melanotic stool/presyncope.  Hemoglobin 4.9 on presentation status post 2 unit PRBC transfusion hemoglobin improved to 7.8.  CTA negative for GI bleed.  GI was consulted status post EGD on 05/28/2025 with no evidence of active bleed plan for C scope in a.m. required a dose of Ativan for anxiety resume home Remeron/Seroquel for anxiety..  Noted to have left ankle wound initiated on doxy and wound care consulted. CT with New occlusion of the left common iliac and left external iliac arteries. There is distal reconstitution   Pending lower extremity arterial and venous ultrasound. Given patient is symptomatic with LLE pain and ulcer, vascular surgery consulted but after discussion with family decided to do close outpatient follow up. Patient tolerated PO well, hgb remained stable. PT/OT consulted because of her limited mobility 2/2 to increased ankle pain. Palliative care consulted for family support. She was discharged home with ne asprin script. Bactrim to cover a UTI, and told to hold her BP meds as they her BP had been quite soft while in the hospital.

## 2025-05-28 NOTE — ASSESSMENT & PLAN NOTE
Anemia is likely due to Iron deficiency. Most recent hemoglobin and hematocrit are listed below.  Recent Labs     05/27/25  1935 05/27/25  2243   HGB 5.2*  --    HCT 16.4* 16.5*     Plan  - Monitor serial CBC: as above  - Transfuse PRBC if patient becomes hemodynamically unstable, symptomatic or H/H drops below 7/21.  - Patient will receive 2U RBC

## 2025-05-28 NOTE — SUBJECTIVE & OBJECTIVE
Past Medical History:   Diagnosis Date    Allergic rhinitis     Amblyopia     Carotid stenosis     Coronary atherosclerosis     [facility] 6/2014: 20% mLAD, 50% mCx, 20%, mRCA    COVID-19 11/15/2023    Dyslipidemia     ESBL (extended spectrum beta-lactamase) producing bacteria infection     UTI    Hypertension     Influenza A 12/28/2023    Nausea and vomiting 05/26/2017    Pulmonary emphysema 10/28/2023    SAH (subarachnoid hemorrhage) 08/24/2016 1999, s/p clipping    Subarachnoid hemorrhage due to ruptured aneurysm 1999       Past Surgical History:   Procedure Laterality Date    ADENOIDECTOMY      ANTERIOR CRUCIATE LIGAMENT REPAIR  2012    APPENDECTOMY      CATARACT EXTRACTION W/  INTRAOCULAR LENS IMPLANT Right 11/07/2022    Procedure: EXTRACTION, CATARACT, WITH IOL INSERTION;  Surgeon: Higinio Cristina MD;  Location: [facility];  Service: Ophthalmology;  Laterality: Right;    CATARACT EXTRACTION W/  INTRAOCULAR LENS IMPLANT Left 11/21/2022    Procedure: EXTRACTION, CATARACT, WITH IOL INSERTION;  Surgeon: Higinio Cristina MD;  Location: [facility];  Service: Ophthalmology;  Laterality: Left;    CEREBRAL ANEURYSM REPAIR  1999    clip    COLONOSCOPY N/A 5/16/2024    Procedure: COLONOSCOPY;  Surgeon: Rich Syed MD;  [facility] ([address] [place] [address]);  Service: Endoscopy;  Laterality: N/A;    DENTAL SURGERY      DIAGNOSTIC LAPAROSCOPY N/A 5/17/2024    Procedure: LAPAROSCOPY, DIAGNOSTIC;  Surgeon: Ruben Oscar MD;  Location: [address];  Service: General;  Laterality: N/A;    ESOPHAGOGASTRODUODENOSCOPY N/A 5/15/2024    Procedure: EGD (ESOPHAGOGASTRODUODENOSCOPY);  Surgeon: Rich Syed MD;  Location: [facility] ([address] [place] [address]);  Service: Endoscopy;  Laterality: N/A;    EXCISION, SMALL INTESTINE N/A 5/17/2024    Procedure: EXCISION, SMALL INTESTINE;  Surgeon: Ruben Oscar MD;  Location: [facility] OR [address] [place] [address];  Service: General;  Laterality: N/A;    EYE SURGERY Bilateral     cataract removal and lens implants     HIP ARTHROPLASTY Left 05/28/2023    Procedure: TOTAL HIP ARTHROPLASTY;  Surgeon: Juan Wan MD;  Location: [facility] OR [address] [place] [address];  Service: Orthopedics;  Laterality: Left;    LAPAROTOMY, EXPLORATORY N/A 5/17/2024    Procedure: LAPAROTOMY, EXPLORATORY;  Surgeon: Ruben Oscar MD;  [facility];  Service: General;  Laterality: N/A;    REPAIR, HERNIA, INGUINAL Bilateral 5/17/2024    Procedure: REPAIR, HERNIA, INGUINAL;  Surgeon: Ruben Oscar MD;  [facility];  Service: General;  Laterality: Bilateral;    TONSILLECTOMY         Review of patient's allergies indicates:   Allergen Reactions    Hydromorphone Anxiety, Other (See Comments) and Hallucinations     Severe agitation       Current Facility-Administered Medications on File Prior to Encounter   Medication    mupirocin 2 % ointment    tranexamic acid (CYKLOKAPRON) 1,000 mg in sodium chloride 0.9 % 100 mL IVPB (MB+)    tranexamic acid (CYKLOKAPRON) 1,000 mg in sodium chloride 0.9 % 100 mL IVPB (MB+)     Current Outpatient Medications on File Prior to Encounter   Medication Sig    allopurinoL (ZYLOPRIM) 300 MG tablet Take 300 mg by mouth once daily.    amLODIPine (NORVASC) 5 MG tablet Take 1 tablet (5 mg total) by mouth once daily.    atorvastatin (LIPITOR) 80 MG tablet TAKE 1 TABLET BY MOUTH EVERY DAY (Patient taking differently: Take 80 mg by mouth once daily.)    carvediloL (COREG) 6.25 MG tablet Take 1 tablet (6.25 mg total) by mouth 2 (two) times daily.    celecoxib (CELEBREX) 200 MG capsule TAKE 1 CAPSULE BY MOUTH DAILY AS NEEDED FOR PAIN.    colchicine (COLCRYS) 0.6 mg tablet TAKE 1 TABLET (0.6 MG TOTAL) BY MOUTH ONCE DAILY. AS NEEDED FOR GOUT    cyanocobalamin 1,000 mcg/mL injection Inject 1,000 mcg into the muscle every 7 days.    fluticasone propionate (FLONASE) 50 mcg/actuation nasal spray 2 sprays (100 mcg total) by Each Nostril route once daily.    fluticasone-umeclidin-vilanter (TRELEGY ELLIPTA)  200-62.5-25 mcg inhaler Inhale 1 puff into the lungs once daily.    hydroCHLOROthiazide (HYDRODIURIL) 25 MG tablet Take 1 tablet (25 mg total) by mouth once daily.    LIDOcaine 4 % Gel Apply 1 g topically 2 (two) times daily as needed (foot pain).    mirtazapine (REMERON) 7.5 MG Tab Take 1 tablet (7.5 mg total) by mouth every evening.    montelukast (SINGULAIR) 10 mg tablet Take 10 mg by mouth every evening.    pantoprazole (PROTONIX) 40 MG tablet Take 1 tablet (40 mg total) by mouth 2 (two) times daily.    erythromycin (ROMYCIN) ophthalmic ointment Place into the right eye every evening. Apply 1/2 inch ribbon. (Patient not taking: Reported on 2025)    LIDOcaine-prilocaine (EMLA) cream Apply topically as needed. (Patient not taking: Reported on 2025)    QUEtiapine (SEROQUEL) 50 MG tablet Take 1 tablet (50 mg total) by mouth 2 (two) times daily. (Patient not taking: Reported on 2025)    syringe, disposable, 1 mL Syrg 1 Stick by Misc.(Non-Drug; Combo Route) route once a week.     Family History       Problem Relation (Age of Onset)    Alcohol abuse Father    Aneurysm Mother    Gout Brother    Heart disease Brother    Hypertension Mother, Father    Kidney disease Brother    No Known Problems Daughter, Daughter, Daughter          Tobacco Use    Smoking status: Former     Current packs/day: 0.00     Average packs/day: 0.5 packs/day for 20.0 years (10.0 ttl pk-yrs)     Types: Cigarettes     Start date: 1979     Quit date: 1999     Years since quittin.4     Passive exposure: Past    Smokeless tobacco: Never   Substance and Sexual Activity    Alcohol use: Yes     Alcohol/week: 14.0 standard drinks of alcohol     Types: 14 Glasses of wine per week     Comment: 1 or 2 glasses of red wine    Drug use: No    Sexual activity: Yes     Partners: Male     Review of Systems   All other systems reviewed and are negative.    Objective:     Vital Signs (Most Recent):  Temp: 98.7 °F (37.1 °C) (25  0045)  Pulse: 94 (05/28/25 0045)  Resp: 20 (05/28/25 0055)  BP: (!) 144/68 (05/28/25 0045)  SpO2: (!) 90 % (05/28/25 0045) Vital Signs (24h Range):  Temp:  [98.2 °F (36.8 °C)-98.7 °F (37.1 °C)] 98.7 °F (37.1 °C)  Pulse:  [] 94  Resp:  [16-20] 20  SpO2:  [83 %-100 %] 90 %  BP: (112-153)/(52-76) 144/68     Weight: 49.9 kg (110 lb)  Body mass index is 19.49 kg/m².     Physical Exam  Vitals and nursing note reviewed.   Constitutional:       General: She is not in acute distress.     Appearance: She is well-developed. She is not diaphoretic.   HENT:      Head: Normocephalic and atraumatic.   Eyes:      General: No scleral icterus.     Conjunctiva/sclera: Conjunctivae normal.   Neck:      Vascular: No JVD.   Cardiovascular:      Rate and Rhythm: Normal rate and regular rhythm.   Pulmonary:      Effort: Pulmonary effort is normal. No respiratory distress.   Abdominal:      General: There is no distension.   Musculoskeletal:      Right lower leg: No edema.      Left lower leg: No edema.      Comments: Abrasion/ulceration to L ankle without purulence or obvious signs of infection    Skin:     Coloration: Skin is pale. Skin is not jaundiced.   Neurological:      Mental Status: She is alert and oriented to person, place, and time.      Motor: No abnormal muscle tone.   Psychiatric:         Behavior: Behavior normal.      Comments: Mildly anxious but appropriately conversational, easily calmed                Significant Labs: All pertinent labs within the past 24 hours have been reviewed.  CBC:   Recent Labs   Lab 05/27/25 1935 05/27/25  2243   WBC 12.67  --    HGB 5.2*  --    HCT 16.4* 16.5*     --      CMP:   Recent Labs   Lab 05/27/25 1935      K 2.8*      CO2 20*      BUN 39*   CREATININE 0.9   CALCIUM 7.5*   PROT 5.6*   ALBUMIN 2.3*   BILITOT 0.3   ALKPHOS 64   AST 23   ALT 16   ANIONGAP 16       Significant Imaging: I have reviewed all pertinent imaging results/findings within the past 24  hours.

## 2025-05-29 ENCOUNTER — ANESTHESIA EVENT (OUTPATIENT)
Dept: ENDOSCOPY | Facility: HOSPITAL | Age: 75
End: 2025-05-29
Payer: MEDICARE

## 2025-05-29 ENCOUNTER — ANESTHESIA (OUTPATIENT)
Dept: ENDOSCOPY | Facility: HOSPITAL | Age: 75
End: 2025-05-29
Payer: MEDICARE

## 2025-05-29 PROBLEM — I73.9 PAD (PERIPHERAL ARTERY DISEASE): Status: ACTIVE | Noted: 2025-05-29

## 2025-05-29 PROBLEM — I74.09 AORTOILIAC OCCLUSIVE DISEASE: Status: ACTIVE | Noted: 2025-05-29

## 2025-05-29 PROBLEM — N30.00 ACUTE CYSTITIS WITHOUT HEMATURIA: Status: ACTIVE | Noted: 2025-05-29

## 2025-05-29 LAB
ABSOLUTE EOSINOPHIL (OHS): 0.19 K/UL
ABSOLUTE EOSINOPHIL (OHS): 0.26 K/UL
ABSOLUTE MONOCYTE (OHS): 3.26 K/UL (ref 0.3–1)
ABSOLUTE MONOCYTE (OHS): 4.09 K/UL (ref 0.3–1)
ABSOLUTE NEUTROPHIL COUNT (OHS): 6.35 K/UL (ref 1.8–7.7)
ABSOLUTE NEUTROPHIL COUNT (OHS): 7.85 K/UL (ref 1.8–7.7)
ALBUMIN SERPL BCP-MCNC: 2.3 G/DL (ref 3.5–5.2)
ALP SERPL-CCNC: 67 UNIT/L (ref 40–150)
ALT SERPL W/O P-5'-P-CCNC: 23 UNIT/L (ref 10–44)
ANION GAP (OHS): 12 MMOL/L (ref 8–16)
AST SERPL-CCNC: 59 UNIT/L (ref 11–45)
BASOPHILS # BLD AUTO: 0.3 K/UL
BASOPHILS # BLD AUTO: 0.39 K/UL
BASOPHILS NFR BLD AUTO: 2.5 %
BASOPHILS NFR BLD AUTO: 2.7 %
BILIRUB SERPL-MCNC: 0.7 MG/DL (ref 0.1–1)
BUN SERPL-MCNC: 13 MG/DL (ref 8–23)
CALCIUM SERPL-MCNC: 7.7 MG/DL (ref 8.7–10.5)
CHLORIDE SERPL-SCNC: 101 MMOL/L (ref 95–110)
CO2 SERPL-SCNC: 28 MMOL/L (ref 23–29)
CREAT SERPL-MCNC: 0.7 MG/DL (ref 0.5–1.4)
ERYTHROCYTE [DISTWIDTH] IN BLOOD BY AUTOMATED COUNT: 19.6 % (ref 11.5–14.5)
ERYTHROCYTE [DISTWIDTH] IN BLOOD BY AUTOMATED COUNT: 19.8 % (ref 11.5–14.5)
GFR SERPLBLD CREATININE-BSD FMLA CKD-EPI: >60 ML/MIN/1.73/M2
GLUCOSE SERPL-MCNC: 77 MG/DL (ref 70–110)
HCT VFR BLD AUTO: 23.2 % (ref 37–48.5)
HCT VFR BLD AUTO: 25.4 % (ref 37–48.5)
HGB BLD-MCNC: 7.4 GM/DL (ref 12–16)
HGB BLD-MCNC: 8.2 GM/DL (ref 12–16)
IMM GRANULOCYTES # BLD AUTO: 0.5 K/UL (ref 0–0.04)
IMM GRANULOCYTES # BLD AUTO: 0.69 K/UL (ref 0–0.04)
IMM GRANULOCYTES NFR BLD AUTO: 4.2 % (ref 0–0.5)
IMM GRANULOCYTES NFR BLD AUTO: 4.7 % (ref 0–0.5)
LYMPHOCYTES # BLD AUTO: 1.2 K/UL (ref 1–4.8)
LYMPHOCYTES # BLD AUTO: 1.26 K/UL (ref 1–4.8)
MAGNESIUM SERPL-MCNC: 1.4 MG/DL (ref 1.6–2.6)
MCH RBC QN AUTO: 30.2 PG (ref 27–31)
MCH RBC QN AUTO: 30.8 PG (ref 27–31)
MCHC RBC AUTO-ENTMCNC: 31.9 G/DL (ref 32–36)
MCHC RBC AUTO-ENTMCNC: 32.3 G/DL (ref 32–36)
MCV RBC AUTO: 95 FL (ref 82–98)
MCV RBC AUTO: 96 FL (ref 82–98)
NUCLEATED RBC (/100WBC) (OHS): 0 /100 WBC
NUCLEATED RBC (/100WBC) (OHS): 0 /100 WBC
OHS QRS DURATION: 86 MS
OHS QTC CALCULATION: 474 MS
PLATELET # BLD AUTO: 391 K/UL (ref 150–450)
PLATELET # BLD AUTO: 414 K/UL (ref 150–450)
PMV BLD AUTO: 9.8 FL (ref 9.2–12.9)
PMV BLD AUTO: 9.9 FL (ref 9.2–12.9)
POTASSIUM SERPL-SCNC: 2.9 MMOL/L (ref 3.5–5.1)
PROT SERPL-MCNC: 5.3 GM/DL (ref 6–8.4)
RBC # BLD AUTO: 2.45 M/UL (ref 4–5.4)
RBC # BLD AUTO: 2.66 M/UL (ref 4–5.4)
RELATIVE EOSINOPHIL (OHS): 1.6 %
RELATIVE EOSINOPHIL (OHS): 1.8 %
RELATIVE LYMPHOCYTE (OHS): 10.2 % (ref 18–48)
RELATIVE LYMPHOCYTE (OHS): 8.7 % (ref 18–48)
RELATIVE MONOCYTE (OHS): 27.6 % (ref 4–15)
RELATIVE MONOCYTE (OHS): 28.1 % (ref 4–15)
RELATIVE NEUTROPHIL (OHS): 53.9 % (ref 38–73)
RELATIVE NEUTROPHIL (OHS): 54 % (ref 38–73)
SODIUM SERPL-SCNC: 141 MMOL/L (ref 136–145)
WBC # BLD AUTO: 11.8 K/UL (ref 3.9–12.7)
WBC # BLD AUTO: 14.54 K/UL (ref 3.9–12.7)

## 2025-05-29 PROCEDURE — 63600175 PHARM REV CODE 636 W HCPCS: Mod: NTX

## 2025-05-29 PROCEDURE — 85025 COMPLETE CBC W/AUTO DIFF WBC: CPT | Mod: NTX | Performed by: STUDENT IN AN ORGANIZED HEALTH CARE EDUCATION/TRAINING PROGRAM

## 2025-05-29 PROCEDURE — 25000003 PHARM REV CODE 250: Mod: NTX | Performed by: STUDENT IN AN ORGANIZED HEALTH CARE EDUCATION/TRAINING PROGRAM

## 2025-05-29 PROCEDURE — 37000008 HC ANESTHESIA 1ST 15 MINUTES: Mod: NTX | Performed by: INTERNAL MEDICINE

## 2025-05-29 PROCEDURE — 27201238 HC BALLOON, OVERTUBE (ANY): Mod: NTX | Performed by: INTERNAL MEDICINE

## 2025-05-29 PROCEDURE — 11000001 HC ACUTE MED/SURG PRIVATE ROOM: Mod: NTX

## 2025-05-29 PROCEDURE — 27000221 HC OXYGEN, UP TO 24 HOURS: Mod: NTX

## 2025-05-29 PROCEDURE — 99900035 HC TECH TIME PER 15 MIN (STAT): Mod: NTX

## 2025-05-29 PROCEDURE — 27202087 HC PROBE, APC: Mod: NTX | Performed by: INTERNAL MEDICINE

## 2025-05-29 PROCEDURE — 83735 ASSAY OF MAGNESIUM: CPT | Mod: NTX

## 2025-05-29 PROCEDURE — 37000009 HC ANESTHESIA EA ADD 15 MINS: Mod: NTX | Performed by: INTERNAL MEDICINE

## 2025-05-29 PROCEDURE — 94640 AIRWAY INHALATION TREATMENT: CPT | Mod: NTX

## 2025-05-29 PROCEDURE — 45382 COLONOSCOPY W/CONTROL BLEED: CPT | Mod: NTX | Performed by: INTERNAL MEDICINE

## 2025-05-29 PROCEDURE — 63600175 PHARM REV CODE 636 W HCPCS: Mod: NTX | Performed by: STUDENT IN AN ORGANIZED HEALTH CARE EDUCATION/TRAINING PROGRAM

## 2025-05-29 PROCEDURE — 36415 COLL VENOUS BLD VENIPUNCTURE: CPT | Mod: NTX | Performed by: STUDENT IN AN ORGANIZED HEALTH CARE EDUCATION/TRAINING PROGRAM

## 2025-05-29 PROCEDURE — 45382 COLONOSCOPY W/CONTROL BLEED: CPT | Mod: 22,NTX,, | Performed by: INTERNAL MEDICINE

## 2025-05-29 PROCEDURE — 99223 1ST HOSP IP/OBS HIGH 75: CPT | Mod: 25,NTX,, | Performed by: SURGERY

## 2025-05-29 PROCEDURE — 82040 ASSAY OF SERUM ALBUMIN: CPT | Mod: NTX | Performed by: STUDENT IN AN ORGANIZED HEALTH CARE EDUCATION/TRAINING PROGRAM

## 2025-05-29 PROCEDURE — 0W3P8ZZ CONTROL BLEEDING IN GASTROINTESTINAL TRACT, VIA NATURAL OR ARTIFICIAL OPENING ENDOSCOPIC: ICD-10-PCS | Performed by: INTERNAL MEDICINE

## 2025-05-29 PROCEDURE — 25000003 PHARM REV CODE 250: Mod: NTX

## 2025-05-29 PROCEDURE — 94761 N-INVAS EAR/PLS OXIMETRY MLT: CPT | Mod: NTX

## 2025-05-29 RX ORDER — POTASSIUM CHLORIDE 7.45 MG/ML
20 INJECTION INTRAVENOUS ONCE
Status: COMPLETED | OUTPATIENT
Start: 2025-05-29 | End: 2025-05-29

## 2025-05-29 RX ORDER — LORAZEPAM 0.5 MG/1
2 TABLET ORAL ONCE AS NEEDED
Status: COMPLETED | OUTPATIENT
Start: 2025-05-29 | End: 2025-05-29

## 2025-05-29 RX ORDER — FENTANYL CITRATE 50 UG/ML
25 INJECTION, SOLUTION INTRAMUSCULAR; INTRAVENOUS EVERY 5 MIN PRN
Status: DISCONTINUED | OUTPATIENT
Start: 2025-05-29 | End: 2025-05-30

## 2025-05-29 RX ORDER — POTASSIUM CHLORIDE 20 MEQ/1
40 TABLET, EXTENDED RELEASE ORAL EVERY 4 HOURS
Status: DISCONTINUED | OUTPATIENT
Start: 2025-05-29 | End: 2025-05-29

## 2025-05-29 RX ORDER — LIDOCAINE HYDROCHLORIDE 20 MG/ML
INJECTION INTRAVENOUS
Status: DISCONTINUED | OUTPATIENT
Start: 2025-05-29 | End: 2025-05-29

## 2025-05-29 RX ORDER — GLUCAGON 1 MG
1 KIT INJECTION
Status: DISCONTINUED | OUTPATIENT
Start: 2025-05-29 | End: 2025-05-31 | Stop reason: HOSPADM

## 2025-05-29 RX ORDER — MAGNESIUM SULFATE HEPTAHYDRATE 40 MG/ML
2 INJECTION, SOLUTION INTRAVENOUS ONCE
Status: COMPLETED | OUTPATIENT
Start: 2025-05-29 | End: 2025-05-29

## 2025-05-29 RX ORDER — MIDAZOLAM HYDROCHLORIDE 1 MG/ML
2 INJECTION, SOLUTION INTRAMUSCULAR; INTRAVENOUS ONCE
Status: DISCONTINUED | OUTPATIENT
Start: 2025-05-29 | End: 2025-05-30

## 2025-05-29 RX ORDER — POTASSIUM CHLORIDE 7.45 MG/ML
10 INJECTION INTRAVENOUS ONCE
Status: COMPLETED | OUTPATIENT
Start: 2025-05-29 | End: 2025-05-29

## 2025-05-29 RX ORDER — PROPOFOL 10 MG/ML
VIAL (ML) INTRAVENOUS
Status: DISCONTINUED | OUTPATIENT
Start: 2025-05-29 | End: 2025-05-29

## 2025-05-29 RX ORDER — ONDANSETRON HYDROCHLORIDE 2 MG/ML
4 INJECTION, SOLUTION INTRAVENOUS DAILY PRN
Status: DISCONTINUED | OUTPATIENT
Start: 2025-05-29 | End: 2025-05-30

## 2025-05-29 RX ORDER — MIDAZOLAM HYDROCHLORIDE 1 MG/ML
1 INJECTION, SOLUTION INTRAMUSCULAR; INTRAVENOUS ONCE
Status: DISCONTINUED | OUTPATIENT
Start: 2025-05-29 | End: 2025-05-29

## 2025-05-29 RX ADMIN — POTASSIUM BICARBONATE 25 MEQ: 978 TABLET, EFFERVESCENT ORAL at 05:05

## 2025-05-29 RX ADMIN — MAGNESIUM SULFATE HEPTAHYDRATE 2 G: 40 INJECTION, SOLUTION INTRAVENOUS at 04:05

## 2025-05-29 RX ADMIN — PROPOFOL 40 MG: 10 INJECTION, EMULSION INTRAVENOUS at 02:05

## 2025-05-29 RX ADMIN — ATORVASTATIN CALCIUM 80 MG: 40 TABLET, FILM COATED ORAL at 09:05

## 2025-05-29 RX ADMIN — DOXYCYCLINE HYCLATE 100 MG: 100 TABLET, COATED ORAL at 09:05

## 2025-05-29 RX ADMIN — Medication 6 MG: at 09:05

## 2025-05-29 RX ADMIN — POTASSIUM BICARBONATE 25 MEQ: 978 TABLET, EFFERVESCENT ORAL at 09:05

## 2025-05-29 RX ADMIN — MAGNESIUM SULFATE HEPTAHYDRATE 2 G: 40 INJECTION, SOLUTION INTRAVENOUS at 06:05

## 2025-05-29 RX ADMIN — MIRTAZAPINE 7.5 MG: 7.5 TABLET, FILM COATED ORAL at 09:05

## 2025-05-29 RX ADMIN — FLUTICASONE FUROATE AND VILANTEROL TRIFENATATE 1 PUFF: 100; 25 POWDER RESPIRATORY (INHALATION) at 09:05

## 2025-05-29 RX ADMIN — QUETIAPINE FUMARATE 50 MG: 25 TABLET ORAL at 09:05

## 2025-05-29 RX ADMIN — POTASSIUM CHLORIDE 10 MEQ: 7.46 INJECTION, SOLUTION INTRAVENOUS at 01:05

## 2025-05-29 RX ADMIN — LIDOCAINE HYDROCHLORIDE 80 MG: 20 INJECTION INTRAVENOUS at 02:05

## 2025-05-29 RX ADMIN — PANTOPRAZOLE SODIUM 40 MG: 40 INJECTION, POWDER, FOR SOLUTION INTRAVENOUS at 09:05

## 2025-05-29 RX ADMIN — ACETAMINOPHEN 650 MG: 325 TABLET ORAL at 09:05

## 2025-05-29 RX ADMIN — ALLOPURINOL 300 MG: 100 TABLET ORAL at 09:05

## 2025-05-29 RX ADMIN — POTASSIUM CHLORIDE 10 MEQ: 7.46 INJECTION, SOLUTION INTRAVENOUS at 11:05

## 2025-05-29 RX ADMIN — SODIUM CHLORIDE: 9 INJECTION, SOLUTION INTRAVENOUS at 02:05

## 2025-05-29 RX ADMIN — PROPOFOL 150 MCG/KG/MIN: 10 INJECTION, EMULSION INTRAVENOUS at 02:05

## 2025-05-29 RX ADMIN — LORAZEPAM 2 MG: 0.5 TABLET ORAL at 09:05

## 2025-05-29 NOTE — PLAN OF CARE
Recommendations     Recommendation/Intervention:   1. Advance diet as tolerated    - please continue to document PO % intake via flowsheets     2. Recommend boost breeze berry TID when diet is advanced   3. Encourage good intake    4. RD to monitor weight, labs, intake, tolerance 5. Recommend MVI     Goals:   1. % nutritional needs met with diet during admission     2. Maintain weight during admission  Nutrition Goal Status: new  Communication of RD Recs: other (comment) (POC)     Nutrition Discharge Planning      Nutrition Discharge Planning: General healthy diet, Oral supplement regimen (comments), Other (comments)  Oral supplement regimen (comments): supplement of choice and MVI  Other (comments): Provide GI soft education at follow up.

## 2025-05-29 NOTE — PLAN OF CARE
Initial Discharge Planning Assessment:  Patient admitted on: 5/27/25     Chart reviewed, Care plan discussed with treatment team,  attending Dr. Klein      PCP updated in Epic: Dr. Valencia   Pharmacy, updated in Epic: Bedside      DME at home: WC, RW , [facility] [employer] [facility] sanchez       Current dispo: Home with family       Transportation: Family can provide      Power of  or Living Will:       Anticipated DC needs from CM perspective:  None     Discharge Plan A and Plan B have been determined by review of patient's clinical status, future medical and therapeutic needs, and coverage/benefits for post-acute care in coordination with multidisciplinary team members.      [facility] Stepdown  Initial Discharge Assessment       Primary Care Provider: Jace Valencia MD    Admission Diagnosis: Shortness of breath [R06.02]  Melena [K92.1]  Leg pain [M79.606]  Screening for cardiovascular condition [Z13.6]  Acute GI bleeding [K92.2]  Chest pain [R07.9]  Ankle wound [S91.009A]    Admission Date: 5/27/2025  Expected Discharge Date: 5/31/2025    Transition of Care Barriers: None    Payor: [employer] [facility] / Plan: [employer] CHOICES / Product Type: Medicare Advantage /     Extended Emergency Contact Information  Primary Emergency Contact: Sandie Floyd  Address: [address] [place] [address] [facility] [place]  Home Phone: [phone]  Work Phone: [phone]  Mobile Phone: [phone]  Relation: Spouse   needed? No  Secondary Emergency Contact: Ramila Floyd  Address: [address] [place] [address] [place]  Mobile Phone: [phone]  Relation: Daughter    Discharge Plan A: Home with family  Discharge Plan B: Home Health, Home with family      [id number] [place] [place] [facility] [address] [facility] [address]  [address] [address]  Phone: [phone] Fax: [phone]    [facility] [place] [facility] [place]  [place], [place] - [address] [facility]  [address] [facility] [address]  Phone: [phone] Fax: [phone]      Initial Assessment (most recent)       Adult Discharge Assessment - 05/29/25 0980          Discharge Assessment    Assessment Type Discharge Planning Assessment     Confirmed/corrected address, phone number and insurance Yes     Confirmed Demographics Correct on Facesheet     Source of Information patient;health record     People in Home spouse     Do you expect to return to your current living situation? Yes     Do you have help at home or someone to help you manage your care at home? Yes     Prior to hospitilization cognitive status: Alert/Oriented     Current cognitive status: Alert/Oriented     Walking or Climbing Stairs Difficulty no     Dressing/Bathing Difficulty no     Dressing/Bathing bathing difficulty, requires equipment     Equipment Currently Used at Home walker, rolling;wheelchair;cane, straight;grab bar;oxygen (P)      Patient currently being followed by outpatient case management? No     Do you currently have service(s) that help you manage your care at home? No     Is the pt/caregiver preference to resume services with current agency No     Do you take prescription medications? Yes     Do you have prescription coverage? Yes     Do you have any problems affording any of your prescribed medications? No     Is the patient taking medications as prescribed? yes     How do you get to doctors appointments? family or friend will provide     Are you on dialysis? No     Do you take coumadin? No     Discharge Plan A Home with family     Discharge Plan B Home Health;Home with family     DME Needed Upon Discharge  none     Discharge Plan discussed with: Patient     Transition of Care Barriers None        Physical Activity    On average, how many days per week do you engage in moderate to strenuous exercise (like a brisk walk)? 0 days (P)      On average, how many minutes do you engage in exercise  at this level? 0 min (P)         Financial Resource Strain    How hard is it for you to pay for the very basics like food, housing, medical care, and heating? Not hard at all (P)         Housing Stability    In the last 12 months, was there a time when you were not able to pay the mortgage or rent on time? No (P)      At any time in the past 12 months, were you homeless or living in a shelter (including now)? No (P)         Transportation Needs    In the past 12 months, has lack of transportation kept you from medical appointments or from getting medications? No (P)      In the past 12 months, has lack of transportation kept you from meetings, work, or from getting things needed for daily living? No (P)         Food Insecurity    Within the past 12 months, you worried that your food would run out before you got the money to buy more. Never true (P)      Within the past 12 months, the food you bought just didn't last and you didn't have money to get more. Never true (P)         Stress    Do you feel stress - tense, restless, nervous, or anxious, or unable to sleep at night because your mind is troubled all the time - these days? Not at all (P)         Social Isolation    How often do you feel lonely or isolated from those around you?  Never (P)         Alcohol Use    Q1: How often do you have a drink containing alcohol? Never (P)      Q2: How many drinks containing alcohol do you have on a typical day when you are drinking? Patient does not drink (P)      Q3: How often do you have six or more drinks on one occasion? Never (P)         Service2Mediaities    In the past 12 months has the electric, gas, oil, or water company threatened to shut off services in your home? No (P)         Health Literacy    How often do you need to have someone help you when you read instructions, pamphlets, or other written material from your doctor or pharmacy? Never (P)

## 2025-05-29 NOTE — NURSING
0838; Notified Dr. Klein of patient's c/o anxiety and patient reporting that she has had this in the past prior to a procedure and if her anxiety doesn't get under control, she will have a seizure and pass out. MD placing order for PO ativan    1100: Dr. Klein at bedside. RN made MD aware of potassium level 2.9, MD adding magnesium check to labs and PO potassium replacement. Transport here to get patient for endoscopy. Notified Lisa Mcgraw RN of above and RN reported that a doctor in endo will order potassium replacement IV and patient can still come to endo.     1130: patient off floor for c.scope    1651: Patient back from colonoscopy, vitals obtained, IV mag started per order, patient unable to take PO potassium tablets, notified MD. Inquired about diet order, no order at this time by MD

## 2025-05-29 NOTE — CONSULTS
"[facility]  Vascular Surgery  Consult Note    Inpatient consult to Vascular Surgery  Consult performed by: Lenin Petersen MD  Consult ordered by: Rody Klein MD        Subjective:     Reason for consult: "occlusion of left common iliac and left external iliac with distal reconstitution seen on CTA for GI bleed. numbness with activity and cold. "    History of Present Illness: Jessica Floyd is a 74 y.o. female with COPD on home oxygen prn, HFpEF, CAD, ERIK, history of GIB with small bowel resection/ IR embolization last year admitted to medicine on 5/28 with acute GI bleed (Hgb 4.9). CTA was obtained to interrogate possible source and incidentally noted was aortoiliac calcific disease. Vascular surgery was consulted.     Patient seen and examined in endoscopy suite prior to her procedure. She is a former smoker (quit 25 years, 1/2 ppd x 20y) ago, maintained on statin therapy. Does not take ASA 2/2 to hx of anemia. Walks "1800 feet" daily with c/o bilateral lower extremity pain that "begins in the ankle ... Runs up my leg and then into the front." No hx of rest pain or pedal ulceration or wounds. No hx of prior vascular surgery.     Past Medical History:   Diagnosis Date    Allergic rhinitis     Amblyopia     Carotid stenosis     Coronary atherosclerosis     Outside [facility] 6/2014: 20% mLAD, 50% mCx, 20%, mRCA    COVID-19 11/15/2023    Dyslipidemia     ESBL (extended spectrum beta-lactamase) producing bacteria infection     UTI    Hypertension     Influenza A 12/28/2023    Nausea and vomiting 05/26/2017    Pulmonary emphysema 10/28/2023    SAH (subarachnoid hemorrhage) 08/24/2016 1999, s/p clipping    Subarachnoid hemorrhage due to ruptured aneurysm 1999       Review of Systems   Gastrointestinal:  Positive for blood in stool.   Musculoskeletal:  Negative for gait problem.   Neurological:  Negative for weakness and numbness.   All other systems reviewed and are negative.    Objective:     Vital Signs " (Most Recent):  Temp: 99.1 °F (37.3 °C) (05/29/25 1137)  Pulse: 98 (05/29/25 1137)  Resp: 16 (05/29/25 1137)  BP: 132/62 (05/29/25 1137)  SpO2: 98 % (2L NC) (05/29/25 1137) Vital Signs (24h Range):  Temp:  [97.4 °F (36.3 °C)-99.1 °F (37.3 °C)] 99.1 °F (37.3 °C)  Pulse:  [] 98  Resp:  [13-29] 16  SpO2:  [91 %-99 %] 98 %  BP: (107-162)/(57-77) 132/62     Weight: 49.9 kg (110 lb)  Body mass index is 19.49 kg/m².      Physical Exam  Vitals and nursing note reviewed.   Constitutional:       General: She is not in acute distress.  HENT:      Mouth/Throat:      Mouth: Mucous membranes are dry.   Cardiovascular:      Rate and Rhythm: Normal rate.      Comments:   No motor sensory deficits  1+ R femoral pulse, non palpable L femoral pulse  Non palpable pedal pulses  Pulmonary:      Effort: Pulmonary effort is normal.   Abdominal:      General: Abdomen is flat.   Musculoskeletal:         General: No tenderness.      Cervical back: Neck supple.      Right lower leg: No edema.      Left lower leg: No edema.   Skin:     General: Skin is warm.   Neurological:      General: No focal deficit present.      Mental Status: She is alert.      Sensory: No sensory deficit.      Motor: No weakness.     Significant Labs:  All pertinent labs from the last 24 hours have been reviewed.    Significant Diagnostics:  I have reviewed all pertinent imaging results/findings within the past 24 hours.    CTA 5/28 - Severe AIOD with dense CFA disease bilaterally. Reconstitution at both femoral arteries with preserved profunda flow.     Assessment/Plan:     Aortoiliac occlusive disease  74F here with acute GI bleed (Hgb 5, s/p 2 units prbc). Incidental finding on CTA of aortoiliac occlusive disease in former smoker with atypical pain that may represent claudication. As she is walking 1800 ft daily, does not appear to be life limiting.     - Will obtain screening ABIs as upfront work up in PAD and arrange outpatient follow up after discharge once  condition stabilizes.   - Secondary prevention of atherosclerotic risk factors: Goal BP <140/90, goal LDL <100, goal HbA1C <7.0  - ASA 81mg daily for prevention of MI, stroke, and acute limb ischemia  - High intensity statin (atorvastatin 40mg or rosuvastatin 20mg)         Thank you for your consult.     Lenin Petersen MD  Vascular Surgery Fellow  [facility] - [facility]

## 2025-05-29 NOTE — SUBJECTIVE & OBJECTIVE
Prescriptions Prior to Admission[1]    Review of patient's allergies indicates:   Allergen Reactions    Hydromorphone Anxiety, Other (See Comments) and Hallucinations     Severe agitation       Past Medical History:   Diagnosis Date    Allergic rhinitis     Amblyopia     Carotid stenosis     Coronary atherosclerosis     Outside [facility] 6/2014: 20% mLAD, 50% mCx, 20%, mRCA    COVID-19 11/15/2023    Dyslipidemia     ESBL (extended spectrum beta-lactamase) producing bacteria infection     UTI    Hypertension     Influenza A 12/28/2023    Nausea and vomiting 05/26/2017    Pulmonary emphysema 10/28/2023    SAH (subarachnoid hemorrhage) 08/24/2016    1999, s/p clipping    Subarachnoid hemorrhage due to ruptured aneurysm 1999     Past Surgical History:   Procedure Laterality Date    ADENOIDECTOMY      ANTERIOR CRUCIATE LIGAMENT REPAIR  2012    APPENDECTOMY      CATARACT EXTRACTION W/  INTRAOCULAR LENS IMPLANT Right 11/07/2022    Procedure: EXTRACTION, CATARACT, WITH IOL INSERTION;  Surgeon: Higinio Cristina MD;  Location: [facility];  Service: Ophthalmology;  Laterality: Right;    CATARACT EXTRACTION W/  INTRAOCULAR LENS IMPLANT Left 11/21/2022    Procedure: EXTRACTION, CATARACT, WITH IOL INSERTION;  Surgeon: Higinio Cristina MD;  Location: [facility];  Service: Ophthalmology;  Laterality: Left;    CEREBRAL ANEURYSM REPAIR  1999    clip    COLONOSCOPY N/A 5/16/2024    Procedure: COLONOSCOPY;  Surgeon: Rich Syed MD;  Location: [address]);  Service: Endoscopy;  Laterality: N/A;    DENTAL SURGERY      DIAGNOSTIC LAPAROSCOPY N/A 5/17/2024    Procedure: LAPAROSCOPY, DIAGNOSTIC;  Surgeon: Ruben Oscar MD;  [address];  Service: General;  Laterality: N/A;    ESOPHAGOGASTRODUODENOSCOPY N/A 5/15/2024    Procedure: EGD (ESOPHAGOGASTRODUODENOSCOPY);  Surgeon: Rich Syed MD;  [facility]  Service: Endoscopy;  Laterality: N/A;    ESOPHAGOGASTRODUODENOSCOPY N/A 5/28/2025    Procedure:  EGD (ESOPHAGOGASTRODUODENOSCOPY);  Surgeon: Rich Syed MD;  Location: [facility] ENDO (2ND FLR);  Service: Endoscopy;  Laterality: N/A;    EXCISION, SMALL INTESTINE N/A 2024    Procedure: EXCISION, SMALL INTESTINE;  Surgeon: Ruben Oscar MD;  [facility];  Service: General;  Laterality: N/A;    EYE SURGERY Bilateral     cataract removal and lens implants    HIP ARTHROPLASTY Left 2023    Procedure: TOTAL HIP ARTHROPLASTY;  Surgeon: Juan Wan MD;  [facility];  Service: Orthopedics;  Laterality: Left;    LAPAROTOMY, EXPLORATORY N/A 2024    Procedure: LAPAROTOMY, EXPLORATORY;  Surgeon: Ruben Oscar MD;  [facility];  Service: General;  Laterality: N/A;    REPAIR, HERNIA, INGUINAL Bilateral 2024    Procedure: REPAIR, HERNIA, INGUINAL;  Surgeon: Ruben Oscar MD;  [facility];  Service: General;  Laterality: Bilateral;    TONSILLECTOMY       Family History       Problem Relation (Age of Onset)    Alcohol abuse Father    Aneurysm Mother    Gout Brother    Heart disease Brother    Hypertension Mother, Father    Kidney disease Brother    No Known Problems Daughter, Daughter, Daughter          Tobacco Use    Smoking status: Former     Current packs/day: 0.00     Average packs/day: 0.5 packs/day for 20.0 years (10.0 ttl pk-yrs)     Types: Cigarettes     Start date: 1979     Quit date: 1999     Years since quittin.4     Passive exposure: Past    Smokeless tobacco: Never   Substance and Sexual Activity    Alcohol use: Yes     Alcohol/week: 14.0 standard drinks of alcohol     Types: 14 Glasses of wine per week     Comment: 1 or 2 glasses of red wine    Drug use: No    Sexual activity: Yes     Partners: Male     Review of Systems   Gastrointestinal:  Positive for blood in stool.   Musculoskeletal:  Negative for gait problem.   Neurological:  Negative for weakness and numbness.   All other systems  reviewed and are negative.    Objective:     Vital Signs (Most Recent):  Temp: 99.1 °F (37.3 °C) (05/29/25 1137)  Pulse: 98 (05/29/25 1137)  Resp: 16 (05/29/25 1137)  BP: 132/62 (05/29/25 1137)  SpO2: 98 % (2L NC) (05/29/25 1137) Vital Signs (24h Range):  Temp:  [97.4 °F (36.3 °C)-99.1 °F (37.3 °C)] 99.1 °F (37.3 °C)  Pulse:  [] 98  Resp:  [13-29] 16  SpO2:  [91 %-99 %] 98 %  BP: (107-162)/(57-77) 132/62     Weight: 49.9 kg (110 lb)  Body mass index is 19.49 kg/m².      Physical Exam  Vitals and nursing note reviewed.   Constitutional:       General: She is not in acute distress.  HENT:      Mouth/Throat:      Mouth: Mucous membranes are dry.   Cardiovascular:      Rate and Rhythm: Normal rate.      Comments: No motor sensory deficits  1+ R femoral pulse, non palpable L femoral pulse  Non palpable pedal pulses  Pulmonary:      Effort: Pulmonary effort is normal.   Abdominal:      General: Abdomen is flat.   Musculoskeletal:         General: No tenderness.      Cervical back: Neck supple.      Right lower leg: No edema.      Left lower leg: No edema.   Skin:     General: Skin is warm.   Neurological:      General: No focal deficit present.      Mental Status: She is alert.      Sensory: No sensory deficit.      Motor: No weakness.          Significant Labs:  All pertinent labs from the last 24 hours have been reviewed.    Significant Diagnostics:  I have reviewed all pertinent imaging results/findings within the past 24 hours.       [1]   Medications Prior to Admission   Medication Sig Dispense Refill Last Dose/Taking    allopurinoL (ZYLOPRIM) 300 MG tablet Take 300 mg by mouth once daily.   Taking    amLODIPine (NORVASC) 5 MG tablet Take 1 tablet (5 mg total) by mouth once daily.   Taking    atorvastatin (LIPITOR) 80 MG tablet TAKE 1 TABLET BY MOUTH EVERY DAY (Patient taking differently: Take 80 mg by mouth once daily.) 90 tablet 3 Taking Differently    carvediloL (COREG) 6.25 MG tablet Take 1 tablet (6.25 mg  total) by mouth 2 (two) times daily. 180 tablet 3 Taking    celecoxib (CELEBREX) 200 MG capsule TAKE 1 CAPSULE BY MOUTH DAILY AS NEEDED FOR PAIN. 30 capsule 2 Taking    colchicine (COLCRYS) 0.6 mg tablet TAKE 1 TABLET (0.6 MG TOTAL) BY MOUTH ONCE DAILY. AS NEEDED FOR GOUT 90 tablet 3 Taking    cyanocobalamin 1,000 mcg/mL injection Inject 1,000 mcg into the muscle every 7 days. 10 mL 5 Taking    fluticasone propionate (FLONASE) 50 mcg/actuation nasal spray 2 sprays (100 mcg total) by Each Nostril route once daily.   Taking    fluticasone-umeclidin-vilanter (TRELEGY ELLIPTA) 200-62.5-25 mcg inhaler Inhale 1 puff into the lungs once daily. 60 each 11 Taking    hydroCHLOROthiazide (HYDRODIURIL) 25 MG tablet Take 1 tablet (25 mg total) by mouth once daily. 90 tablet 3 Taking    LIDOcaine 4 % Gel Apply 1 g topically 2 (two) times daily as needed (foot pain).   Taking As Needed    mirtazapine (REMERON) 7.5 MG Tab Take 1 tablet (7.5 mg total) by mouth every evening. 90 tablet 1 Taking    montelukast (SINGULAIR) 10 mg tablet Take 10 mg by mouth every evening.   Taking    pantoprazole (PROTONIX) 40 MG tablet Take 1 tablet (40 mg total) by mouth 2 (two) times daily. 180 tablet 3 Taking    erythromycin (ROMYCIN) ophthalmic ointment Place into the right eye every evening. Apply 1/2 inch ribbon. (Patient not taking: Reported on 5/27/2025) 3.5 g 0 Not Taking    LIDOcaine-prilocaine (EMLA) cream Apply topically as needed. (Patient not taking: Reported on 5/27/2025) 30 g 3 Not Taking    QUEtiapine (SEROQUEL) 50 MG tablet Take 1 tablet (50 mg total) by mouth 2 (two) times daily. (Patient not taking: Reported on 5/27/2025) 180 tablet 1 Not Taking    syringe, disposable, 1 mL Syrg 1 Stick by Misc.(Non-Drug; Combo Route) route once a week. 25 each 1

## 2025-05-29 NOTE — CARE UPDATE
I have reviewed the chart of Jessica Floyd who is hospitalized for the following:    Active Hospital Problems    Diagnosis    *Acute GI bleeding    Acute cystitis without hematuria     Urine culture with GNR, sensitivity and susceptibility pending   WBC peaked at 14, down trending   Afebrile      Hypomagnesemia    Body mass index (BMI) 19.9 or less, adult    Abrasion    (HFpEF) heart failure with preserved ejection fraction    Pulmonary emphysema    Iron deficiency anemia secondary to blood loss (chronic)    Hypertension    Hypokalemia    Gastroesophageal reflux disease without esophagitis    Coronary artery disease involving native coronary artery of native heart without angina pectoris     statin        Fern Cedillo PA-C  [facility]

## 2025-05-29 NOTE — ANESTHESIA PREPROCEDURE EVALUATION
05/29/2025  Jessica Floyd is a 74 y.o., female.  Pre-operative evaluation for Procedure(s) (LRB):  COLONOSCOPY, WITH RETROGRADE BALLOON ENTEROSCOPY, SINGLE OR DOUBLE BALLOON (N/A)    Jessica Floyd is a 74 y.o. female       Problem List[1]    Past Surgical History:   Procedure Laterality Date    ADENOIDECTOMY      ANTERIOR CRUCIATE LIGAMENT REPAIR  2012    APPENDECTOMY      CATARACT EXTRACTION W/  INTRAOCULAR LENS IMPLANT Right 11/07/2022    Procedure: EXTRACTION, CATARACT, WITH IOL INSERTION;  Surgeon: Higinio Cristina MD;  Location: [facility];  Service: Ophthalmology;  Laterality: Right;    CATARACT EXTRACTION W/  INTRAOCULAR LENS IMPLANT Left 11/21/2022    Procedure: EXTRACTION, CATARACT, WITH IOL INSERTION;  Surgeon: Higinio Cristina MD;  Location: [facility];  Service: Ophthalmology;  Laterality: Left;    CEREBRAL ANEURYSM REPAIR  1999    clip    COLONOSCOPY N/A 5/16/2024    Procedure: COLONOSCOPY;  Surgeon: Rich Syed MD;  Location: [facility] (2ND FLR);  Service: Endoscopy;  Laterality: N/A;    DENTAL SURGERY      DIAGNOSTIC LAPAROSCOPY N/A 5/17/2024    Procedure: LAPAROSCOPY, DIAGNOSTIC;  Surgeon: Ruben Oscar MD;  Location: [facility] 2ND FLR;  Service: General;  Laterality: N/A;    ESOPHAGOGASTRODUODENOSCOPY N/A 5/15/2024    Procedure: EGD (ESOPHAGOGASTRODUODENOSCOPY);  Surgeon: Rich Syed MD;  Location: [facility] (2ND FLR);  Service: Endoscopy;  Laterality: N/A;    ESOPHAGOGASTRODUODENOSCOPY N/A 5/28/2025    Procedure: EGD (ESOPHAGOGASTRODUODENOSCOPY);  Surgeon: Rich Syed MD;  Location: [facility] (2ND FLR);  Service: Endoscopy;  Laterality: N/A;    EXCISION, SMALL INTESTINE N/A 5/17/2024    Procedure: EXCISION, SMALL INTESTINE;  Surgeon: Ruben Oscar MD;  Location: [facility] OR 2ND FLR;  Service: General;  Laterality: N/A;    EYE SURGERY Bilateral     cataract removal and lens  "implants    HIP ARTHROPLASTY Left 05/28/2023    Procedure: TOTAL HIP ARTHROPLASTY;  Surgeon: Juan Wan MD;  Location: [facility] OR [address] [place] [address];  Service: Orthopedics;  Laterality: Left;    LAPAROTOMY, EXPLORATORY N/A 5/17/2024    Procedure: LAPAROTOMY, EXPLORATORY;  Surgeon: Ruben Oscar MD;  Location: [facility] OR [address] [place] [address];  Service: General;  Laterality: N/A;    REPAIR, HERNIA, INGUINAL Bilateral 5/17/2024    Procedure: REPAIR, HERNIA, INGUINAL;  Surgeon: Ruben Oscar MD;  Location: [facility] OR [address] [place] [address];  Service: General;  Laterality: Bilateral;    TONSILLECTOMY         Social History[2]    Medications Ordered Prior to Encounter[3]    Review of patient's allergies indicates:   Allergen Reactions    Hydromorphone Anxiety, Other (See Comments) and Hallucinations     Severe agitation         CBC:   Recent Labs     05/28/25  2316 05/29/25  0221   WBC 14.54* 11.80   RBC 2.66* 2.45*   HGB 8.2* 7.4*   HCT 25.4* 23.2*    391   MCV 96 95   MCH 30.8 30.2   MCHC 32.3 31.9*       CMP:   Recent Labs     05/27/25  1935 05/27/25  2146 05/28/25  0345 05/29/25  0806     --  136 141   K 2.8*  --  4.4 2.9*     --  104 101   CO2 20*  --  22* 28   BUN 39*  --  28* 13   CREATININE 0.9  --  0.7 0.7     --  92 77   MG  --  1.2* 2.1  --    CALCIUM 7.5*  --  7.5* 7.7*   ALBUMIN 2.3*  --   --  2.3*   PROT 5.6*  --   --  5.3*   ALKPHOS 64  --   --  67   ALT 16  --   --  23   AST 23  --   --  59*   BILITOT 0.3  --   --  0.7       INR  No results for input(s): "PT", "INR", "PROTIME", "APTT" in the last 72 hours.      Diagnostic Studies:    EKG:   Results for orders placed or performed during the hospital encounter of 05/27/25   EKG 12-lead    Collection Time: 05/27/25  7:15 PM   Result Value Ref Range    QRS Duration 88 ms    [facility] QTC Calculation 452 ms    Narrative    Test Reason : R06.02,    Vent. Rate :  97 BPM     Atrial Rate :  97 BPM     P-R Int : 152 ms          QRS Dur :  88 ms      QT Int : 356 " ms       P-R-T Axes :  86  74 248 degrees    QTcB Int : 452 ms    Sinus rhythm with Premature supraventricular complexes  Low septal forces  ST and T wave abnormality, consider inferolateral ischemia  Abnormal ECG  When compared with ECG of 27-May-2025 15:25,  Premature ventricular complexes are no longer Present  Premature supraventricular complexes are now Present  Low septal forces is now Present  ST now depressed in Inferior leads  Inverted T waves have replaced nonspecific T wave abnormality in Inferior  leads  Confirmed by Brody Palacios ([id number]) on 5/28/2025 10:40:25 AM    Referred By: AAAREFERRAL SELF           Confirmed By: Brody Palacios       TTE:  Results for orders placed or performed during the hospital encounter of 10/28/23   Echo   Result Value Ref Range    Ascending aorta 4.14 cm    STJ 2.98 cm    AV mean gradient 4 mmHg    Ao peak radha 1.33 m/s    Ao VTI 27.32 cm    IVS 0.79 0.6 - 1.1 cm    LA size 2.93 cm    Left Atrium Major Axis 3.88 cm    Left Atrium Minor Axis 4.39 cm    LVIDd 4.98 3.5 - 6.0 cm    LVIDs 3.18 2.1 - 4.0 cm    LVOT diameter 2.20 cm    LVOT peak VTI 17.59 cm    PW 0.67 0.6 - 1.1 cm    MV Peak A Radha 0.79 m/s    E wave deceleration time 244.94 msec    MV Peak E Rahda 0.55 m/s    RA Major Axis 4.35 cm    RA Width 4.45 cm    RVDD 3.60 cm    Sinus 3.69 cm    TAPSE 1.78 cm    TR Max Radha 2.84 m/s    LA WIDTH 4.23 cm    MV stenosis pressure 1/2 time 71.03 ms    LV Diastolic Volume 116.90 mL    LV Systolic Volume 40.37 mL    LVOT peak radha 0.83 m/s    TDI LATERAL 0.08 m/s    TDI SEPTAL 0.04 m/s    LA Vol (MOD) 47.60 cm3    LV LATERAL E/E' RATIO 6.88 m/s    LV SEPTAL E/E' RATIO 13.75 m/s    FS 36 %    LA Vol 43.40 cm3    LV mass 120.06 g    ZLVIDD 0.96     ZLVIDS 0.99     Left Ventricle Relative Wall Thickness 0.27 cm    AV valve area 2.45 cm²    AV Velocity Ratio 0.62     AV index (prosthetic) 0.64     MV valve area p 1/2 method 3.10 cm2    E/A ratio 0.70     Mean e' 0.06 m/s    LVOT area 3.8 cm2     "LVOT stroke volume 66.83 cm3    AV peak gradient 7 mmHg    E/E' ratio 9.17 m/s    LV Systolic Volume Index 25.6 mL/m2    LV Diastolic Volume Index 73.99 mL/m2    LISSET 27.5 mL/m2    LV Mass Index 76 g/m2    Triscuspid Valve Regurgitation Peak Gradient 32 mmHg    LISSET (MOD) 30.1 mL/m2    RUDDY by Velocity Ratio 2.37 cm²    BSA 1.57 m2    TV resting pulmonary artery pressure 40 mmHg    RV TB RVSP 11 mmHg    Est. RA pres 8 mmHg    Narrative      Left Ventricle: The left ventricle is normal in size. Normal wall   thickness. Normal wall motion. There is normal systolic function with a   visually estimated ejection fraction of 60 - 65%. There is normal   diastolic function.    Right Ventricle: Mild right ventricular enlargement. Wall thickness is   normal. Right ventricle wall motion  is normal. Systolic function is   normal.    Aortic Valve: The aortic valve is a unicuspid valve. There is mild   aortic valve sclerosis. There is trace aortic regurgitation.    Mitral Valve: There is mild regurgitation.    Pulmonary Artery: There is mild pulmonary hypertension. The estimated   pulmonary artery systolic pressure is 40 mmHg.    IVC/SVC: Intermediate venous pressure at 8 mmHg.       No results found for: "EF"   No results found. However, due to the size of the patient record, not all encounters were searched. Please check Results Review for a complete set of results.    MALLY:  No results found. However, due to the size of the patient record, not all encounters were searched. Please check Results Review for a complete set of results.    Stress Test:  No results found for this or any previous visit.       LHC:  No results found for this or any previous visit.       PFT:  No results found for: "FEV1", "FVC", "IDB2DMX", "TLC", "DLCO"     ALLERGIES:     Review of patient's allergies indicates:   Allergen Reactions    Hydromorphone Anxiety, Other (See Comments) and Hallucinations     Severe agitation     LDA:    "       Lines/Drains/Airways       Drain  Duration             Female External Urinary Catheter w/ Suction 05/28/25 0705 1 day              Peripheral Intravenous Line  Duration                  Peripheral IV - Single Lumen 05/28/25 2253 18 G Anterior;Proximal;Right Forearm <1 day                   Anesthesia Evaluation      Airway   Mallampati: II  TM distance: > 6 cm  Neck ROM: Normal ROM  Dental    (+) Intact    Pulmonary    (+) COPD  Cardiovascular   (+) hypertension    Rate: Normal    Neuro/Psych      GI/Hepatic/Renal    (+) GERD    Endo/Other    (+) hyperthyroidism  Abdominal                          Pre-op Assessment    I have reviewed the Patient Summary Reports.     I have reviewed the Nursing Notes. I have reviewed the NPO Status.   I have reviewed the Medications.     Review of Systems  Anesthesia Hx:  No problems with previous Anesthesia             Denies Family Hx of Anesthesia complications.    Denies Personal Hx of Anesthesia complications.                    Cardiovascular:     Hypertension                                          Pulmonary:   COPD                     Hepatic/GI:     GERD                Neurological:  Neurology Normal                                      Endocrine:    Hyperthyroidism             Physical Exam  General: Well nourished    Airway:  Mallampati: II   Mouth Opening: Normal  TM Distance: > 6 cm  Tongue: Normal  Neck ROM: Normal ROM    Dental:  Intact    Chest/Lungs:  Normal Respiratory Rate    Heart:  Rate: Normal        Anesthesia Plan  Type of Anesthesia, risks & benefits discussed:    Anesthesia Type: Gen Natural Airway, MAC  Intra-op Monitoring Plan: Standard ASA Monitors  Post Op Pain Control Plan: multimodal analgesia and IV/PO Opioids PRN  Induction:  IV  Airway Plan: Direct, Post-Induction  Informed Consent: Informed consent signed with the Patient and all parties understand the risks and agree with anesthesia plan.  All questions answered. Patient consented to blood  products? Yes  ASA Score: 3  Day of Surgery Review of History & Physical: H&P Update referred to the surgeon/provider.    Ready For Surgery From Anesthesia Perspective.     .           [1]   Patient Active Problem List  Diagnosis    Coronary artery disease involving native coronary artery of native heart without angina pectoris    Bilateral carotid artery stenosis    Gastroesophageal reflux disease without esophagitis    CKD stage G3a/A1, GFR 45-59 and albumin creatinine ratio <30 mg/g    Hypokalemia    Hypertension    Subclinical hyperthyroidism    Allergic rhinitis    Iron deficiency anemia secondary to blood loss (chronic)    History of fracture of left hip    Leukocytosis    Acute blood loss anemia    Malnutrition of mild degree    Pathological fracture due to osteoporosis    Osteoporosis    Localized osteoporosis of [place]    Impaired mobility    Reduced vision-Left eye    Gout    Right rotator cuff tear arthropathy    SOB (shortness of breath)    Eosinophilia    Anxiety    PAC (premature atrial contraction)    Aortic atherosclerosis    Pulmonary emphysema    Weight loss    Moderate malnutrition    (HFpEF) heart failure with preserved ejection fraction    History Situational (ie Stress Induced) syncope ([facility] admission 12-25-23    Nicotine dependence, cigarettes, in FULL REMISSION:in past smoked for 17yrs from 16y to about 34y / LUPE:F pack a day    Acute GI bleeding    Ischemia, bowel    Hypernatremia    Incarcerated inguinal hernia    Left leg pain    Abrasion    Comfort measures only status    Localized swelling of left lower extremity    Thrombocytosis    Skin ulcer of sacrum, with unspecified severity    Monocytosis    Hypomagnesemia    Body mass index (BMI) 19.9 or less, adult    Acute cystitis without hematuria   [2]   Social History  Socioeconomic History    Marital status:    Occupational History    Occupation: Retired   Tobacco Use    Smoking status: Former     Current packs/day: 0.00      Average packs/day: 0.5 packs/day for 20.0 years (10.0 ttl pk-yrs)     Types: Cigarettes     Start date: 1979     Quit date: 1999     Years since quittin.4     Passive exposure: Past    Smokeless tobacco: Never   Substance and Sexual Activity    Alcohol use: Yes     Alcohol/week: 14.0 standard drinks of alcohol     Types: 14 Glasses of wine per week     Comment: 1 or 2 glasses of red wine    Drug use: No    Sexual activity: Yes     Partners: Male   Social History Narrative    .  Has 3 grown daughters.       Social Drivers of Health     Financial Resource Strain: Low Risk  (2025)    Overall Financial Resource Strain (CARDIA)     Difficulty of Paying Living Expenses: Not hard at all   Food Insecurity: No Food Insecurity (2025)    Hunger Vital Sign     Worried About Running Out of Food in the Last Year: Never true     Ran Out of Food in the Last Year: Never true   Transportation Needs: No Transportation Needs (2025)    PRAPARE - Transportation     Lack of Transportation (Medical): No     Lack of Transportation (Non-Medical): No   Physical Activity: Inactive (2025)    Exercise Vital Sign     Days of Exercise per Week: 0 days     Minutes of Exercise per Session: 0 min   Stress: No Stress Concern Present (2025)    [employer] [place] [employer] - Occupational Stress Questionnaire     Feeling of Stress : Not at all   Recent Concern: Stress - Stress Concern Present (3/19/2025)    [employer] [place] [employer] - Occupational Stress Questionnaire     Feeling of Stress : To some extent   Housing Stability: Low Risk  (2025)    Housing Stability Vital Sign     Unable to Pay for Housing in the Last Year: No     Number of Times Moved in the Last Year: 1     Homeless in the Last Year: No   [3]   Current Facility-Administered Medications on File Prior to Encounter   Medication Dose Route Frequency Provider Last Rate Last Admin    mupirocin 2 % ointment   Nasal On  Call Procedure Kang Diaz MD   Given at 10/25/23 1045    tranexamic acid (CYKLOKAPRON) 1,000 mg in sodium chloride 0.9 % 100 mL IVPB (MB+)  1,000 mg Intravenous Once Kang Diaz MD        tranexamic acid (CYKLOKAPRON) 1,000 mg in sodium chloride 0.9 % 100 mL IVPB (MB+)  1,000 mg Intravenous Once Kang Diaz MD         Current Outpatient Medications on File Prior to Encounter   Medication Sig Dispense Refill    allopurinoL (ZYLOPRIM) 300 MG tablet Take 300 mg by mouth once daily.      amLODIPine (NORVASC) 5 MG tablet Take 1 tablet (5 mg total) by mouth once daily.      atorvastatin (LIPITOR) 80 MG tablet TAKE 1 TABLET BY MOUTH EVERY DAY (Patient taking differently: Take 80 mg by mouth once daily.) 90 tablet 3    carvediloL (COREG) 6.25 MG tablet Take 1 tablet (6.25 mg total) by mouth 2 (two) times daily. 180 tablet 3    celecoxib (CELEBREX) 200 MG capsule TAKE 1 CAPSULE BY MOUTH DAILY AS NEEDED FOR PAIN. 30 capsule 2    colchicine (COLCRYS) 0.6 mg tablet TAKE 1 TABLET (0.6 MG TOTAL) BY MOUTH ONCE DAILY. AS NEEDED FOR GOUT 90 tablet 3    cyanocobalamin 1,000 mcg/mL injection Inject 1,000 mcg into the muscle every 7 days. 10 mL 5    fluticasone propionate (FLONASE) 50 mcg/actuation nasal spray 2 sprays (100 mcg total) by Each Nostril route once daily.      fluticasone-umeclidin-vilanter (TRELEGY ELLIPTA) 200-62.5-25 mcg inhaler Inhale 1 puff into the lungs once daily. 60 each 11    hydroCHLOROthiazide (HYDRODIURIL) 25 MG tablet Take 1 tablet (25 mg total) by mouth once daily. 90 tablet 3    LIDOcaine 4 % Gel Apply 1 g topically 2 (two) times daily as needed (foot pain).      mirtazapine (REMERON) 7.5 MG Tab Take 1 tablet (7.5 mg total) by mouth every evening. 90 tablet 1    montelukast (SINGULAIR) 10 mg tablet Take 10 mg by mouth every evening.      pantoprazole (PROTONIX) 40 MG tablet Take 1 tablet (40 mg total) by mouth 2 (two) times daily. 180 tablet 3    erythromycin (ROMYCIN) ophthalmic ointment  Place into the right eye every evening. Apply 1/2 inch ribbon. (Patient not taking: Reported on 5/27/2025) 3.5 g 0    LIDOcaine-prilocaine (EMLA) cream Apply topically as needed. (Patient not taking: Reported on 5/27/2025) 30 g 3    QUEtiapine (SEROQUEL) 50 MG tablet Take 1 tablet (50 mg total) by mouth 2 (two) times daily. (Patient not taking: Reported on 5/27/2025) 180 tablet 1    syringe, disposable, 1 mL Syrg 1 Stick by Misc.(Non-Drug; Combo Route) route once a week. 25 each 1

## 2025-05-29 NOTE — CONSULTS
[facility]    Wound Care     Patient Name:  Jessica Floyd  MRN:  [id number]  Date: 5/29/2025  Diagnosis: Acute GI bleeding     History:  Past Medical History:   Diagnosis Date    Allergic rhinitis     Amblyopia     Carotid stenosis     Coronary atherosclerosis     [facility] 6/[facility]: 20% mLAD, 50% mCx, 20%, mRCA    COVID-19 11/15/2023    Dyslipidemia     ESBL (extended spectrum beta-lactamase) producing bacteria infection     UTI    Hypertension     Influenza A 12/28/2023    Nausea and vomiting 05/26/2017    Pulmonary emphysema 10/28/2023    SAH (subarachnoid hemorrhage) 08/24/2016 1999, s/p clipping    Subarachnoid hemorrhage due to ruptured aneurysm 1999     Social History[1]  Precautions:  Allergies as of 05/27/2025 - Reviewed 05/27/2025   Allergen Reaction Noted    Hydromorphone Anxiety, Other (See Comments), and Hallucinations 07/04/2024       [facility] Assessment Details / Treatment:      Chart reviewed for this encounter.   Labs:   WBC (K/uL)   Date Value   05/29/2025 11.80   05/28/2025 14.54 (H)     Glucose (mg/dL)   Date Value   05/29/2025 77   05/28/2025 92   02/03/2025 99   09/25/2024 85     Albumin (g/dL)   Date Value   05/29/2025 2.3 (L)   05/27/2025 2.3 (L)   02/03/2025 3.0 (L)   09/25/2024 3.6       Mamadou Score: 21    Narrative:  Pt seen for WC consultation / follow-up and agreed to assessment  Chart reviewed for this encounter. Patient lying in bed, family at bedside. Able to reposition independently and reports feeling very anxious. L ankle wound bed red with slough and tender, POA. Periwound pink. Pt states she's had wound for a long time and takes care of it at home. Refused buttocks assessment at this time.  Versette in use. Bilateral heels pink, blanchable intact skin. Moitsturizer applied and elevated off bed with pillows. Supplies left in room.    RECOMMENDATIONS: Gently clean left medial ankle with Vashe, apply skin barrier and aquacel Ag to wound bed. Cover with mepilex foam q 3d and  prn.  Heel offloading boots while in bed.    Bedside nursing to continue care, dressing changes, & continue monitoring.  Bedside nursing to maintain pressure injury prevention interventions, (PIP). WC to follow    See Flow Sheet for additional documentation and media.   25   WOCN Assessment   WOCN Total Time (mins) 30   Visit Date 25   Visit Time 928   Consult Type New   WOCN Speciality Wound   Intervention assessed;changed;applied;chart review;coordination of care   Teaching on-going   Skin Interventions   Device Skin Pressure Protection absorbent pad utilized/changed   Positioning   Body Position supine   Head of Bed (HOB) Positioning HOB elevated        Wound 24 Skin Tear Left anterior;distal;lower Ankle   Date First Assessed/Time First Assessed: 24   Present on Original Admission: Yes  Primary Wound Type: Skin Tear  Side: Left  Orientation: anterior;distal;lower  Location: Ankle  Is this injury device related?: No   Wound Image    Dressing Appearance Intact   Drainage Amount Scant   Drainage Characteristics/Odor Serous   Appearance Red;Slough   Tissue loss description Partial thickness   Periwound Area Pink;Intact   Wound Edges Irregular   Wound Length (cm) 2.9 cm   Wound Width (cm) 2 cm   Wound Depth (cm) 0.1 cm   Wound Volume (cm^3) 0.304 cm^3   Wound Surface Area (cm^2) 4.56 cm^2   Care Cleansed with:;Antimicrobial agent   Dressing Applied;Foam                                  [1]   Social History  Socioeconomic History    Marital status:    Occupational History    Occupation: Retired   Tobacco Use    Smoking status: Former     Current packs/day: 0.00     Average packs/day: 0.5 packs/day for 20.0 years (10.0 ttl pk-yrs)     Types: Cigarettes     Start date: 1979     Quit date: 1999     Years since quittin.4     Passive exposure: Past    Smokeless tobacco: Never   Substance and Sexual Activity    Alcohol use: Yes     Alcohol/week: 14.0 standard drinks  of alcohol     Types: 14 Glasses of wine per week     Comment: 1 or 2 glasses of red wine    Drug use: No    Sexual activity: Yes     Partners: Male   Social History Narrative    .  Has 3 grown daughters.       Social Drivers of Health     Financial Resource Strain: Low Risk  (5/29/2025)    Overall Financial Resource Strain (CARDIA)     Difficulty of Paying Living Expenses: Not hard at all   Food Insecurity: No Food Insecurity (5/29/2025)    Hunger Vital Sign     Worried About Running Out of Food in the Last Year: Never true     Ran Out of Food in the Last Year: Never true   Transportation Needs: No Transportation Needs (5/29/2025)    PRAPARE - Transportation     Lack of Transportation (Medical): No     Lack of Transportation (Non-Medical): No   Physical Activity: Inactive (5/29/2025)    Exercise Vital Sign     Days of Exercise per Week: 0 days     Minutes of Exercise per Session: 0 min   Stress: No Stress Concern Present (5/29/2025)    [place] [employer] - Occupational Stress Questionnaire     Feeling of Stress : Not at all   Recent Concern: Stress - Stress Concern Present (3/19/2025)    [place] [employer] - Occupational Stress Questionnaire     Feeling of Stress : To some extent   Housing Stability: Low Risk  (5/29/2025)    Housing Stability Vital Sign     Unable to Pay for Housing in the Last Year: No     Number of Times Moved in the Last Year: 1     Homeless in the Last Year: No

## 2025-05-29 NOTE — PROVATION PATIENT INSTRUCTIONS
Discharge Summary/Instructions after an Endoscopic Procedure  Patient Name: Jessica Floyd  Patient MRN: [id number]  Patient YOB: 1950  Thursday, May 29, 2025  Rich Syde MD  Dear patient,  As a result of recent federal legislation (The Federal Cures Act), you may   receive lab or pathology results from your procedure in your [facility]   account before your physician is able to contact you. Your physician or   their representative will relay the results to you with their   recommendations at their soonest availability.  Thank you,  RESTRICTIONS:  During your procedure today, you received medications for sedation.  These   medications may affect your judgment, balance and coordination.  Therefore,   for 24 hours, you have the following restrictions:   - DO NOT drive a car, operate machinery, make legal/financial decisions,   sign important papers or drink alcohol.    ACTIVITY:  Today: no heavy lifting, straining or running due to procedural   sedation/anesthesia.  The following day: return to full activity including work.  DIET:  Eat and drink normally unless instructed otherwise.     TREATMENT FOR COMMON SIDE EFFECTS:  - Mild abdominal pain, nausea, belching, bloating or excessive gas:  rest,   eat lightly and use a heating pad.  - Sore Throat: treat with throat lozenges and/or gargle with warm salt   water.  - Because air was used during the procedure, expelling large amounts of air   from your rectum or belching is normal.  - If a bowel prep was taken, you may not have a bowel movement for 1-3 days.    This is normal.  SYMPTOMS TO WATCH FOR AND REPORT TO YOUR PHYSICIAN:  1. Abdominal pain or bloating, other than gas cramps.  2. Chest pain.  3. Back pain.  4. Signs of infection such as: chills or fever occurring within 24 hours   after the procedure.  5. Rectal bleeding, which would show as bright red, maroon, or black stools.   (A tablespoon of blood from the rectum is not serious, especially if    hemorrhoids are present.)  6. Vomiting.  7. Weakness or dizziness.  GO DIRECTLY TO THE NEAREST EMERGENCY ROOM IF YOU HAVE ANY OF THE FOLLOWING:      Difficulty breathing              Chills and/or fever over 101 F   Persistent vomiting and/or vomiting blood   Severe abdominal pain   Severe chest pain   Black, tarry stools   Bleeding- more than one tablespoon   Any other symptom or condition that you feel may need urgent attention  Your doctor recommends these additional instructions:  If any biopsies were taken, your doctors clinic will contact you in 1 to 2   weeks with any results.  - Return patient to hospital [facility] for ongoing care.   - Advance diet as tolerated.   - Continue present medications.   - No recommendation at this time regarding repeat colonoscopy.  For questions, problems or results please call your physician - Rich Syed MD at Work:  [phone].  [facility], EMERGENCY ROOM PHONE NUMBER: [phone]  IF A COMPLICATION OR EMERGENCY SITUATION ARISES AND YOU ARE UNABLE TO REACH   YOUR PHYSICIAN - GO DIRECTLY TO THE EMERGENCY ROOM.  Rich Syed MD  5/29/2025 6:55:27 PM  This report has been verified and signed electronically.  Dear patient,  As a result of recent federal legislation (The Federal Cures Act), you may   receive lab or pathology results from your procedure in your [facility]   account before your physician is able to contact you. Your physician or   their representative will relay the results to you with their   recommendations at their soonest availability.  Thank you,  PROVATION

## 2025-05-29 NOTE — NURSING TRANSFER
Nursing Transfer Note      5/29/2025   4:19 PM    Nurse giving handoff:BARRY Dhillon PACU  Nurse receiving handoff:BARRY Marrero CSU    Reason patient is being transferred: post anesthesia    Transfer To: [id number]    Transfer via stretcher    Transfer with  to O2, cardiac monitoring    Transported by PCT    Transfer Vital Signs:  Blood Pressure:125/59  Heart Rate:82  O2:96%-- 3LNC  Temperature:  Respirations:23    Telemetry: Box 1114, HR 83, NSR  Order for Tele Monitor? Yes    Additional Lines: Oxygen    Medicines sent: n/a    Any special needs or follow-up needed: n/a    Patient belongings transferred with patient: Yes    Chart send with patient: Yes    Notified: daughter    Patient reassessed at: 5/29/25@ 3730

## 2025-05-29 NOTE — PLAN OF CARE
Problem: Adult Inpatient Plan of Care  Goal: Plan of Care Review  Outcome: Progressing  Goal: Patient-Specific Goal (Individualized)  Outcome: Progressing  Goal: Absence of Hospital-Acquired Illness or Injury  Outcome: Progressing  Goal: Optimal Comfort and Wellbeing  Outcome: Progressing  Goal: Readiness for Transition of Care  Outcome: Progressing     Problem: Gastrointestinal Bleeding  Goal: Optimal Coping with Acute Illness  Outcome: Progressing  Goal: Hemostasis  Outcome: Progressing     Problem: Wound  Goal: Optimal Coping  Outcome: Progressing  Goal: Optimal Functional Ability  Outcome: Progressing  Goal: Absence of Infection Signs and Symptoms  Outcome: Progressing  Goal: Improved Oral Intake  Outcome: Progressing  Goal: Optimal Pain Control and Function  Outcome: Progressing  Goal: Skin Health and Integrity  Outcome: Progressing  Goal: Optimal Wound Healing  Outcome: Progressing

## 2025-05-29 NOTE — HPI
"Jessica Floyd is a 74 y.o. female with COPD on home oxygen prn, HFpEF, CAD, ERIK, history of GIB with small bowel resection/ IR embolization last year admitted to medicine on 5/28 with acute GI bleed (Hgb 4.9). CTA was obtained to interrogate possible source and incidentally noted was aortoiliac calcific disease. Vascular surgery was consulted.     Patient seen and examined in endoscopy suite prior to her procedure. She is a former smoker (quit 25 years) ago, maintained on statin therapy. Does not take ASA 2/2 to hx of anemia. Walks "1800 feet" daily with c/o bilateral lower extremity pain that "begins in the ankle ... Runs up my leg and then into the front." No hx of rest pain or pedal ulceration or wounds. No hx of prior vascular surgery.   "

## 2025-05-29 NOTE — TRANSFER OF CARE
"Anesthesia Transfer of Care Note    Patient: Jessica Floyd    Procedure(s) Performed: Procedure(s) (LRB):  COLONOSCOPY, WITH RETROGRADE BALLOON ENTEROSCOPY, SINGLE OR DOUBLE BALLOON (N/A)    Patient location: PACU    Anesthesia Type: general    Transport from OR: Transported from OR on 6-10 L/min O2 by face mask with adequate spontaneous ventilation    Post pain: adequate analgesia    Post assessment: no apparent anesthetic complications    Post vital signs: stable    Level of consciousness: responds to stimulation    Nausea/Vomiting: no nausea/vomiting    Complications: none    Transfer of care protocol was followed      Last vitals: Visit Vitals  /62 (Patient Position: Lying)   Pulse 98   Temp 37.3 °C (99.1 °F) (Temporal)   Resp 16   Ht 5' 3" (1.6 m)   Wt 49.9 kg (110 lb)   SpO2 98%   Breastfeeding No   BMI 19.49 kg/m²     "

## 2025-05-29 NOTE — ANESTHESIA POSTPROCEDURE EVALUATION
Anesthesia Post Evaluation    Patient: Jessica Floyd    Procedure(s) Performed: Procedure(s) (LRB):  COLONOSCOPY, WITH RETROGRADE BALLOON ENTEROSCOPY, SINGLE OR DOUBLE BALLOON (N/A)    Final Anesthesia Type: general      Patient location during evaluation: PACU  Patient participation: Yes- Able to Participate  Level of consciousness: awake and alert and oriented  Post-procedure vital signs: reviewed and stable  Pain management: adequate  Airway patency: patent    PONV status at discharge: No PONV  Anesthetic complications: no      Cardiovascular status: blood pressure returned to baseline  Respiratory status: unassisted, room air and spontaneous ventilation  Hydration status: euvolemic  Follow-up not needed.              Vitals Value Taken Time   /68 05/29/25 15:16   Temp 36.3 °C (97.3 °F) 05/29/25 15:15   Pulse 81 05/29/25 15:21   Resp 33 05/29/25 15:21   SpO2 98 % 05/29/25 15:21   Vitals shown include unfiled device data.      No case tracking events are documented in the log.      Pain/Paris Score: Pain Rating Prior to Med Admin: 8 (5/28/2025 11:27 PM)  Pain Rating Post Med Admin: 0 (5/29/2025 12:26 AM)  Paris Score: 8 (5/29/2025  3:15 PM)

## 2025-05-29 NOTE — CONSULTS
[facility] - [facility] (2nd Fl)  Adult Nutrition  Consult Note    SUMMARY     Recommendations    Recommendation/Intervention:   1. Advance diet as tolerated    - please continue to document PO % intake via flowsheets     2. Recommend boost breeze berry TID when diet is advanced   3. Encourage good intake    4. RD to monitor weight, labs, intake, tolerance 5. Recommend MVI    Goals:   1. % nutritional needs met with diet during admission     2. Maintain weight during admission  Nutrition Goal Status: new  Communication of RD Recs: other (comment) (POC)    Nutrition Discharge Planning     Nutrition Discharge Planning: General healthy diet, Oral supplement regimen (comments), Other (comments)  Oral supplement regimen (comments): supplement of choice and MVI  Other (comments): Provide GI soft education at follow up.    Assessment and Plan    Pending NFPE    Reason for Assessment    Reason For Assessment: consult (BMI < 20)  Diagnosis: gastrointestinal disease (GI bleeding (Chronic))  General Information Comments: Pt admitted with acute GI bleeding (Chronic). PMHx: carotid stenosis, HTN, DLD, ESBL, Coronary atherosclerosis, SAH, Pulmonary emphysema, COPD, CAD. Recent surgical hx: Esophagogastroduodenoscopy (5/28). No edema noted. Wound: skin tear on left ankle and sacral spine. Spoke with pt's  and daughter while pt was off the floor for a procedure. Having abdominal pain and diarrhea - BM: 5/29. Pt has been having GI issues for about 1.5-2 years now which is the causes to wt loss over the years. Pt has good and bad days with appetite/intake due to stomach issues. Wt stable x 12 months per chart. Provide GI soft education at follow up.    Nutrition/Diet History    Nutrition Intake History: 3 meals - small portions  Spiritual, Cultural Beliefs, Jewish Practices, Values that Affect Care: no  Food Allergies: NKFA  Factors Affecting Nutritional Intake: decreased appetite, abdominal pain, NPO    Nutrition  "Related Social Determinants of Health: SDOH: Adequate food in home environment    Food Insecurity: No Food Insecurity (2025)    Hunger Vital Sign     Worried About Running Out of Food in the Last Year: Never true     Ran Out of Food in the Last Year: Never true     Anthropometrics    Height: 5' 3" (160 cm)  Height (inches): 63 in  Height Method: Estimated  Weight: 49.9 kg (110 lb)  Weight (lb): 110 lb  Weight Method: Estimated  Ideal Body Weight (IBW), Female: 115 lb  % Ideal Body Weight, Female (lb): 95.65 %  BMI (Calculated): 19.5  BMI Grade: less than 23 (older than 65 years) - underweight  Usual Body Weight (UBW), k.7 kg  % Usual Body Weight: 68.78    Lab/Procedures/Meds    Pertinent Labs Reviewed: reviewed  Pertinent Labs Comments: H/H 7.4/23.2 low, iron 21 low, vit B 12 > 2000 high, K 2.9 low, Ca 7.7 low, albumin 2.3 low, AST 59  Pertinent Medications Reviewed: reviewed  Pertinent Medications Comments: allopurinol,atorvastatin, doxycyline, mirtazapine, pantoprazole, quetiapine    Estimated/Assessed Needs    Weight Used For Calorie Calculations: 49.9 kg (110 lb 0.2 oz)  Energy Calorie Requirements (kcal): 1752 kcal  Energy Need Method: [place]-St Jeor (* 1.3 + 500 kcal for wt gain)  Protein Requirements: 49-74 g/pro (1.0-1.5 g/kg)  Weight Used For Protein Calculations: 49 kg (108 lb 0.4 oz)        RDA Method (mL): 1752  CHO Requirement: 219 g    Nutrition Prescription Ordered    Current Diet Order: NPO    Evaluation of Received Nutrient/Fluid Intake    Energy Calories Required: not meeting needs  Protein Required: not meeting needs  Fluid Required: other (see comments) (per MD)  Tolerance: not tolerating  % Intake of Estimated Energy Needs: 0 - 25 %  % Meal Intake: NPO    PES Statement    Inadequate energy intake related to Inadequate protein energy intake as evidenced by Intake <75% estimated needs, NPO/CL status due to medical condition  Status: New    Nutrition Risk    Level of Risk/Frequency of " Follow-up: low - moderate (1-2/week)     Monitor and Evaluation    Monitor and Evaluation: Energy intake, Food and beverage intake, Protein intake, Carbohydrate intake, Diet order, Weight, Electrolyte and renal panel, Gastrointestinal profile, Inflammatory profile, Lipid profile, Glucose/endocrine profile, Skin, Nutrition focused physical findings     Nutrition Follow-Up    RD Follow-up?: Yes

## 2025-05-29 NOTE — ASSESSMENT & PLAN NOTE
74F here with acute GI bleed (Hgb 5, s/p 2 units prbc). Incidental finding on CTA of aortoiliac occlusive disease in former smoker with atypical pain that may represent claudication. As she is walking 1800 ft daily, does not appear to be life limiting.

## 2025-05-30 ENCOUNTER — PATIENT MESSAGE (OUTPATIENT)
Dept: INTERNAL MEDICINE | Facility: CLINIC | Age: 75
End: 2025-05-30
Payer: MEDICARE

## 2025-05-30 PROBLEM — Z51.5 PALLIATIVE CARE ENCOUNTER: Status: ACTIVE | Noted: 2025-05-30

## 2025-05-30 LAB
ABSOLUTE EOSINOPHIL (OHS): 0.25 K/UL
ABSOLUTE MONOCYTE (OHS): 4.48 K/UL (ref 0.3–1)
ABSOLUTE NEUTROPHIL COUNT (OHS): 8.2 K/UL (ref 1.8–7.7)
ANION GAP (OHS): 8 MMOL/L (ref 8–16)
BACTERIA UR CULT: ABNORMAL
BASOPHILS # BLD AUTO: 0.08 K/UL
BASOPHILS NFR BLD AUTO: 0.5 %
BUN SERPL-MCNC: 8 MG/DL (ref 8–23)
CALCIUM SERPL-MCNC: 8 MG/DL (ref 8.7–10.5)
CHLORIDE SERPL-SCNC: 101 MMOL/L (ref 95–110)
CO2 SERPL-SCNC: 28 MMOL/L (ref 23–29)
CREAT SERPL-MCNC: 0.7 MG/DL (ref 0.5–1.4)
ERYTHROCYTE [DISTWIDTH] IN BLOOD BY AUTOMATED COUNT: 19.2 % (ref 11.5–14.5)
GFR SERPLBLD CREATININE-BSD FMLA CKD-EPI: >60 ML/MIN/1.73/M2
GLUCOSE SERPL-MCNC: 119 MG/DL (ref 70–110)
HCT VFR BLD AUTO: 25.5 % (ref 37–48.5)
HGB BLD-MCNC: 7.7 GM/DL (ref 12–16)
IMM GRANULOCYTES # BLD AUTO: 0.74 K/UL (ref 0–0.04)
IMM GRANULOCYTES NFR BLD AUTO: 4.7 % (ref 0–0.5)
LACTATE SERPL-SCNC: 0.6 MMOL/L (ref 0.5–2.2)
LYMPHOCYTES # BLD AUTO: 1.93 K/UL (ref 1–4.8)
MAGNESIUM SERPL-MCNC: 2.2 MG/DL (ref 1.6–2.6)
MCH RBC QN AUTO: 29.6 PG (ref 27–31)
MCHC RBC AUTO-ENTMCNC: 30.2 G/DL (ref 32–36)
MCV RBC AUTO: 98 FL (ref 82–98)
NUCLEATED RBC (/100WBC) (OHS): 0 /100 WBC
PHOSPHATE SERPL-MCNC: 2.5 MG/DL (ref 2.7–4.5)
PLATELET # BLD AUTO: 418 K/UL (ref 150–450)
PMV BLD AUTO: 9.7 FL (ref 9.2–12.9)
POTASSIUM SERPL-SCNC: 3.3 MMOL/L (ref 3.5–5.1)
RBC # BLD AUTO: 2.6 M/UL (ref 4–5.4)
RELATIVE EOSINOPHIL (OHS): 1.6 %
RELATIVE LYMPHOCYTE (OHS): 12.3 % (ref 18–48)
RELATIVE MONOCYTE (OHS): 28.6 % (ref 4–15)
RELATIVE NEUTROPHIL (OHS): 52.3 % (ref 38–73)
SODIUM SERPL-SCNC: 137 MMOL/L (ref 136–145)
WBC # BLD AUTO: 15.68 K/UL (ref 3.9–12.7)

## 2025-05-30 PROCEDURE — 36415 COLL VENOUS BLD VENIPUNCTURE: CPT | Mod: NTX

## 2025-05-30 PROCEDURE — 84100 ASSAY OF PHOSPHORUS: CPT | Mod: NTX

## 2025-05-30 PROCEDURE — 25000003 PHARM REV CODE 250: Mod: NTX

## 2025-05-30 PROCEDURE — 93010 ELECTROCARDIOGRAM REPORT: CPT | Mod: NTX,,, | Performed by: INTERNAL MEDICINE

## 2025-05-30 PROCEDURE — 85025 COMPLETE CBC W/AUTO DIFF WBC: CPT | Mod: NTX | Performed by: STUDENT IN AN ORGANIZED HEALTH CARE EDUCATION/TRAINING PROGRAM

## 2025-05-30 PROCEDURE — 82310 ASSAY OF CALCIUM: CPT | Mod: NTX

## 2025-05-30 PROCEDURE — 63600175 PHARM REV CODE 636 W HCPCS: Mod: NTX | Performed by: STUDENT IN AN ORGANIZED HEALTH CARE EDUCATION/TRAINING PROGRAM

## 2025-05-30 PROCEDURE — 25000003 PHARM REV CODE 250: Mod: NTX | Performed by: STUDENT IN AN ORGANIZED HEALTH CARE EDUCATION/TRAINING PROGRAM

## 2025-05-30 PROCEDURE — 25000242 PHARM REV CODE 250 ALT 637 W/ HCPCS: Mod: NTX | Performed by: STUDENT IN AN ORGANIZED HEALTH CARE EDUCATION/TRAINING PROGRAM

## 2025-05-30 PROCEDURE — 99497 ADVNCD CARE PLAN 30 MIN: CPT | Mod: 25,NTX,,

## 2025-05-30 PROCEDURE — 11000001 HC ACUTE MED/SURG PRIVATE ROOM: Mod: NTX

## 2025-05-30 PROCEDURE — 25000003 PHARM REV CODE 250: Mod: NTX | Performed by: INTERNAL MEDICINE

## 2025-05-30 PROCEDURE — 93005 ELECTROCARDIOGRAM TRACING: CPT | Mod: NTX

## 2025-05-30 PROCEDURE — 83605 ASSAY OF LACTIC ACID: CPT | Mod: NTX

## 2025-05-30 PROCEDURE — 99223 1ST HOSP IP/OBS HIGH 75: CPT | Mod: 25,NTX,,

## 2025-05-30 PROCEDURE — 63600175 PHARM REV CODE 636 W HCPCS: Mod: NTX | Performed by: INTERNAL MEDICINE

## 2025-05-30 PROCEDURE — 93922 UPR/L XTREMITY ART 2 LEVELS: CPT | Mod: NTX | Performed by: STUDENT IN AN ORGANIZED HEALTH CARE EDUCATION/TRAINING PROGRAM

## 2025-05-30 PROCEDURE — 83735 ASSAY OF MAGNESIUM: CPT | Mod: NTX

## 2025-05-30 PROCEDURE — 36415 COLL VENOUS BLD VENIPUNCTURE: CPT | Mod: NTX | Performed by: STUDENT IN AN ORGANIZED HEALTH CARE EDUCATION/TRAINING PROGRAM

## 2025-05-30 RX ORDER — ASPIRIN 81 MG/1
81 TABLET ORAL DAILY
Status: DISCONTINUED | OUTPATIENT
Start: 2025-05-31 | End: 2025-05-30

## 2025-05-30 RX ORDER — GABAPENTIN 300 MG/1
300 CAPSULE ORAL 3 TIMES DAILY
Status: DISCONTINUED | OUTPATIENT
Start: 2025-05-30 | End: 2025-05-31 | Stop reason: HOSPADM

## 2025-05-30 RX ORDER — ACETAMINOPHEN 500 MG
1000 TABLET ORAL ONCE
Status: COMPLETED | OUTPATIENT
Start: 2025-05-30 | End: 2025-05-30

## 2025-05-30 RX ORDER — ASPIRIN 81 MG/1
81 TABLET ORAL DAILY
Status: DISCONTINUED | OUTPATIENT
Start: 2025-05-30 | End: 2025-05-31 | Stop reason: HOSPADM

## 2025-05-30 RX ORDER — METHOCARBAMOL 500 MG/1
500 TABLET, FILM COATED ORAL EVERY 6 HOURS PRN
Status: DISCONTINUED | OUTPATIENT
Start: 2025-05-30 | End: 2025-05-31 | Stop reason: HOSPADM

## 2025-05-30 RX ORDER — MAGNESIUM SULFATE 1 G/100ML
1 INJECTION INTRAVENOUS ONCE
Status: COMPLETED | OUTPATIENT
Start: 2025-05-30 | End: 2025-05-30

## 2025-05-30 RX ORDER — METHOCARBAMOL 500 MG/1
500 TABLET, FILM COATED ORAL ONCE
Status: COMPLETED | OUTPATIENT
Start: 2025-05-30 | End: 2025-05-30

## 2025-05-30 RX ADMIN — MIRTAZAPINE 7.5 MG: 7.5 TABLET, FILM COATED ORAL at 08:05

## 2025-05-30 RX ADMIN — ALLOPURINOL 300 MG: 100 TABLET ORAL at 08:05

## 2025-05-30 RX ADMIN — DOXYCYCLINE HYCLATE 100 MG: 100 TABLET, COATED ORAL at 08:05

## 2025-05-30 RX ADMIN — ATORVASTATIN CALCIUM 80 MG: 40 TABLET, FILM COATED ORAL at 08:05

## 2025-05-30 RX ADMIN — FLUTICASONE FUROATE AND VILANTEROL TRIFENATATE 1 PUFF: 100; 25 POWDER RESPIRATORY (INHALATION) at 08:05

## 2025-05-30 RX ADMIN — GABAPENTIN 300 MG: 300 CAPSULE ORAL at 08:05

## 2025-05-30 RX ADMIN — METHOCARBAMOL 500 MG: 500 TABLET ORAL at 01:05

## 2025-05-30 RX ADMIN — Medication 6 MG: at 08:05

## 2025-05-30 RX ADMIN — METHOCARBAMOL 500 MG: 500 TABLET ORAL at 08:05

## 2025-05-30 RX ADMIN — ACETAMINOPHEN 650 MG: 325 TABLET ORAL at 05:05

## 2025-05-30 RX ADMIN — PANTOPRAZOLE SODIUM 40 MG: 40 INJECTION, POWDER, FOR SOLUTION INTRAVENOUS at 08:05

## 2025-05-30 RX ADMIN — METHOCARBAMOL 500 MG: 500 TABLET ORAL at 05:05

## 2025-05-30 RX ADMIN — ACETAMINOPHEN 1000 MG: 500 TABLET ORAL at 11:05

## 2025-05-30 RX ADMIN — QUETIAPINE FUMARATE 50 MG: 25 TABLET ORAL at 08:05

## 2025-05-30 RX ADMIN — MAGNESIUM SULFATE HEPTAHYDRATE 1 G: 500 INJECTION, SOLUTION INTRAMUSCULAR; INTRAVENOUS at 08:05

## 2025-05-30 RX ADMIN — GABAPENTIN 300 MG: 300 CAPSULE ORAL at 04:05

## 2025-05-30 RX ADMIN — ASPIRIN 81 MG: 81 TABLET, COATED ORAL at 04:05

## 2025-05-30 NOTE — ASSESSMENT & PLAN NOTE
Patient has Abnormal Magnesium: hypomagnesemia. Will continue to monitor electrolytes closely. Will replace the affected electrolytes and repeat labs to be done after interventions completed. The patient's magnesium results have been reviewed and are listed below.  Recent Labs   Lab 05/29/25  0806   MG 1.4*

## 2025-05-30 NOTE — NURSING
"Patient continues with LLE pain, cramping. Also complaining of chest "squeezing" stating muscle related, no shortness of breath, when takes a deep breath actually feels better. MD made aware. Scheduled gabapentin was added for leg pain. Patient and daughter made aware.   "

## 2025-05-30 NOTE — ASSESSMENT & PLAN NOTE
Impression:  Jessica Floyd is a 74 y.o. female with PMHx of COPD, HFpEF, CAD, ERIK, history of GIB with small bowel resection/ IR embolization in May 2024 that presented to ED on 5/27/25 with complaints of weakness and increased episodes of dark stools. Palliative Medicine was consulted by primary, Dr. Kleni, for goals of care and advanced care planning discussions.    Patient is currently resting in bed, supplemental O2 at 2L. Patient is AAOx4, daughter (Ramila Floyd) at bedside, attentive to patient's needs. Patient and daughter are amenable to Palliative Medicine.     - Prior experience with serious illness: yes  -The patient has not previously engaged in advance care planning or C discussions  - Insight/Understanding of illness: TBD      Advance Care Planning     Date: 05/30/2025    GOC  I engaged the patient and family in a voluntary conversation about advance care planning and we specifically addressed what the goals of care would be moving forward, in light of the patient's change in clinical status, specifically debility, frailty, GIB, and current hospitalization.  We did not specifically address the patient's likely prognosis, which appears to be fair; pending discussion from vascular surgery.  We explored the patient's values and preferences for future care.  The patient endorses that what is most important right now is to focus on spending time at home, avoiding the hospital, remaining as independent as possible, symptom/pain control, and quality of life, even if it means sacrificing a little time. Ramila expressed concerns of patient's quality of life as patient has been dealing with GIB for over a year and need for additional resources at home. I provided patient and family with list of in-home care agencies in [facility].    Accordingly, we have decided that the best plan to meet the patient's goals includes continuing with treatment and allowing for additional time for goals of care  discussions             Life Limiting Diagnosis  GIB with small bowel resection/ IR embolization   - GI following  Anemia   - Serial CBCs   - monitor per primary  COPD   - on home O2        Symptom Management  - Pain: reports intermittent discomfort to left leg. Patient describes pain as intermittent sharp/cramping pain that started about 3 days ago. Patient rates pain 7-8. No aggravating factors reported. Patient reports discomfort was relieved with 500mg methocarbamol.  - continue management per primary team    - Dyspnea: no  - 24h SpO2: 92-99%  - on home O2 intermittently  - currently maintained on 2L O2     - Constipation: no  - diarrhea, frequent dark stools  - GI following    - Nausea: no  - ondansetron 8mg q8h PRN    - Debility: yes  - Functional status: poor.  Pt was not able to manage ADLs  - Fall precautions  - PT/OT eval and treat    - Insomnia: yes  - melatonin 6mg PRN        Recommendations  - Please see above  - Continue management per primary team  - Patient and family would benefit from continued education and information regarding plan of care, prognosis, and what to expect in the future  - Most important goals at this time: getting more information from vascular surgery regarding treatment options   - Code status: Full Code   - Disposition: home        The above recommendations communicated directly to primary team on 5/30/25  Thank you for consulting Palliative Medicine.

## 2025-05-30 NOTE — CARE UPDATE
Vascular Surgery Update:     Further collateral obtained from the family. She apparently does not walk to the degree that she had mentioned and also hx of non healing lateral malleolus wound for the last 2-3 weeks that originated from blister.     Non invasives studies obtained notable for   Left - flat waveforms throughout the leg, toe pressure 0.   Right - ANGEL 0.29, toe pressure 24 with moderately dampened digit waveforms.     - Appreciate discussion with GI: okay for antiplatelet meds, anticoagulation as need for procedure, if has rebleeding episode okay to scope on antiplatelets    - Patient and family strongly prefer to have workup/surgery done as outpatient, given chronicity of occlusion and wounds will schedule for short term f/u.

## 2025-05-30 NOTE — SUBJECTIVE & OBJECTIVE
Interval History: Palliative Medicine introduced to patient and daughter. Initiation of goals of care discussions.    Past Medical History:   Diagnosis Date    Allergic rhinitis     Amblyopia     Carotid stenosis     Coronary atherosclerosis     Outside [facility] 6/2014: 20% mLAD, 50% mCx, 20%, mRCA    COVID-19 11/15/2023    Dyslipidemia     ESBL (extended spectrum beta-lactamase) producing bacteria infection     UTI    Hypertension     Influenza A 12/28/2023    Nausea and vomiting 05/26/2017    Pulmonary emphysema 10/28/2023    SAH (subarachnoid hemorrhage) 08/24/2016 1999, s/p clipping    Subarachnoid hemorrhage due to ruptured aneurysm 1999       Past Surgical History:   Procedure Laterality Date    ADENOIDECTOMY      ANTERIOR CRUCIATE LIGAMENT REPAIR  2012    APPENDECTOMY      CATARACT EXTRACTION W/  INTRAOCULAR LENS IMPLANT Right 11/07/2022    Procedure: EXTRACTION, CATARACT, WITH IOL INSERTION;  Surgeon: Higinio Cristina MD;  Location: [facility];  Service: Ophthalmology;  Laterality: Right;    CATARACT EXTRACTION W/  INTRAOCULAR LENS IMPLANT Left 11/21/2022    Procedure: EXTRACTION, CATARACT, WITH IOL INSERTION;  Surgeon: Higinio Cristina MD;  Location: [facility];  Service: Ophthalmology;  Laterality: Left;    CEREBRAL ANEURYSM REPAIR  1999    clip    COLONOSCOPY N/A 5/16/2024    Procedure: COLONOSCOPY;  Surgeon: Rich Syed MD;  Location: [facility] (2ND FLR);  Service: Endoscopy;  Laterality: N/A;    COLONOSCOPY, WITH RETROGRADE BALLOON ENTEROSCOPY, SINGLE OR DOUBLE BALLOON N/A 5/29/2025    Procedure: COLONOSCOPY, WITH RETROGRADE BALLOON ENTEROSCOPY, SINGLE OR DOUBLE BALLOON;  Surgeon: Rich Syed MD;  Location: [facility] ENDO (2ND FLR);  Service: Endoscopy;  Laterality: N/A;    DENTAL SURGERY      DIAGNOSTIC LAPAROSCOPY N/A 5/17/2024    Procedure: LAPAROSCOPY, DIAGNOSTIC;  Surgeon: Ruben Oscar MD;  Location: [facility] 2ND FLR;  Service: General;  Laterality: N/A;    ESOPHAGOGASTRODUODENOSCOPY N/A  5/15/2024    Procedure: EGD (ESOPHAGOGASTRODUODENOSCOPY);  Surgeon: Rich Syed MD;  Location: [facility] ENDO (2ND FLR);  Service: Endoscopy;  Laterality: N/A;    ESOPHAGOGASTRODUODENOSCOPY N/A 5/28/2025    Procedure: EGD (ESOPHAGOGASTRODUODENOSCOPY);  Surgeon: Rich Syed MD;  Location: [facility] ENDO (2ND FLR);  Service: Endoscopy;  Laterality: N/A;    EXCISION, SMALL INTESTINE N/A 5/17/2024    Procedure: EXCISION, SMALL INTESTINE;  Surgeon: Ruben Oscar MD;  Location: [facility] OR 2ND FLR;  Service: General;  Laterality: N/A;    EYE SURGERY Bilateral     cataract removal and lens implants    HIP ARTHROPLASTY Left 05/28/2023    Procedure: TOTAL HIP ARTHROPLASTY;  Surgeon: Juan Wan MD;  Location: [facility] OR 2ND FLR;  Service: Orthopedics;  Laterality: Left;    LAPAROTOMY, EXPLORATORY N/A 5/17/2024    Procedure: LAPAROTOMY, EXPLORATORY;  Surgeon: Ruben Oscar MD;  Location: [facility] OR 2ND FLR;  Service: General;  Laterality: N/A;    REPAIR, HERNIA, INGUINAL Bilateral 5/17/2024    Procedure: REPAIR, HERNIA, INGUINAL;  Surgeon: Ruben Oscar MD;  Location: [facility] OR 2ND FLR;  Service: General;  Laterality: Bilateral;    TONSILLECTOMY         Review of patient's allergies indicates:   Allergen Reactions    Hydromorphone Anxiety, Other (See Comments) and Hallucinations     Severe agitation       Medications:  Continuous Infusions:  Scheduled Meds:   allopurinoL  300 mg Oral Daily    atorvastatin  80 mg Oral Daily    doxycycline  100 mg Oral Q12H    fluticasone furoate-vilanteroL  1 puff Inhalation Daily    midazolam  2 mg Intravenous Once    mirtazapine  7.5 mg Oral QHS    pantoprazole  40 mg Intravenous Q12H    QUEtiapine  50 mg Oral QHS     PRN Meds:  Current Facility-Administered Medications:     0.9%  NaCl infusion (for blood administration), , Intravenous, Q24H PRN    acetaminophen, 650 mg, Oral, Q4H PRN    albuterol-ipratropium, 3 mL, Nebulization, Q6H PRN    aluminum-magnesium  hydroxide-simethicone, 30 mL, Oral, QID PRN    fentaNYL, 25 mcg, Intravenous, Q5 Min PRN    glucagon (human recombinant), 1 mg, Intramuscular, PRN    glucagon (human recombinant), 1 mg, Intramuscular, PRN    glucose, 16 g, Oral, PRN    glucose, 24 g, Oral, PRN    melatonin, 6 mg, Oral, Nightly PRN    naloxone, 0.02 mg, Intravenous, PRN    ondansetron, 8 mg, Oral, Q8H PRN    ondansetron, 4 mg, Intravenous, Daily PRN    polyethylene glycol, 17 g, Oral, Daily PRN    simethicone, 1 tablet, Oral, QID PRN    sodium chloride 0.9%, 1-10 mL, Intravenous, Q12H PRN    Family History       Problem Relation (Age of Onset)    Alcohol abuse Father    Aneurysm Mother    Gout Brother    Heart disease Brother    Hypertension Mother, Father    Kidney disease Brother    No Known Problems Daughter, Daughter, Daughter          Tobacco Use    Smoking status: Former     Current packs/day: 0.00     Average packs/day: 0.5 packs/day for 20.0 years (10.0 ttl pk-yrs)     Types: Cigarettes     Start date: 1979     Quit date: 1999     Years since quittin.4     Passive exposure: Past    Smokeless tobacco: Never   Substance and Sexual Activity    Alcohol use: Yes     Alcohol/week: 14.0 standard drinks of alcohol     Types: 14 Glasses of wine per week     Comment: 1 or 2 glasses of red wine    Drug use: No    Sexual activity: Yes     Partners: Male       Review of Systems   Constitutional:  Positive for activity change and fatigue.   Respiratory:  Positive for shortness of breath.    Gastrointestinal:  Positive for diarrhea.   Skin:  Positive for wound.   Neurological:  Positive for weakness.   Psychiatric/Behavioral:  The patient is nervous/anxious.      Objective:     Vital Signs (Most Recent):  Temp: 98.3 °F (36.8 °C) (25 0736)  Pulse: 86 (25 0836)  Resp: 17 (25 0836)  BP: 120/61 (25 0736)  SpO2: 95 % (25 0736) Vital Signs (24h Range):  Temp:  [97.3 °F (36.3 °C)-98.6 °F (37 °C)] 98.3 °F (36.8 °C)  Pulse:   [77-92] 86  Resp:  [14-68] 17  SpO2:  [92 %-99 %] 95 %  BP: ()/(53-68) 120/61     Weight: 49.9 kg (110 lb)  Body mass index is 19.49 kg/m².       Physical Exam  Vitals and nursing note reviewed.   Constitutional:       General: She is not in acute distress.     Appearance: She is ill-appearing.   HENT:      Head: Normocephalic and atraumatic.      Nose: Nose normal.      Mouth/Throat:      Mouth: Mucous membranes are dry.   Eyes:      Extraocular Movements: Extraocular movements intact.   Cardiovascular:      Rate and Rhythm: Normal rate.   Pulmonary:      Effort: Pulmonary effort is normal.      Comments: On intermittent home O2  Abdominal:      Palpations: Abdomen is soft.   Skin:     General: Skin is warm.      Coloration: Skin is pale.   Neurological:      Mental Status: She is alert and oriented to person, place, and time.      Motor: Weakness present.            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  - enjoys spending time with her family and friends  **Primary  to Follow**  Palliative Care  Consult: Yes        Advance Care Planning   Advance Directives:   Living Will: No    LaPOST: No    Do Not Resuscitate Status: No    Medical Power of : No      Decision Making:  Patient answered questions  Goals of Care: The patient and family endorses that what is most important right now is to focus on remaining as independent as possible, symptom/pain control, and quality of life, even if it means sacrificing a little time    Accordingly, we have  decided that the best plan to meet the patient's goals includes continuing with treatment, meeting with vascular surgery, and allowing for additional time for goal of care discussions         Significant Labs: All pertinent labs within the past 24 hours have been reviewed.  CBC:   Recent Labs   Lab 05/30/25  0245   WBC 15.68*   HGB 7.7*   HCT 25.5*   MCV 98        BMP:  Recent Labs   Lab 05/30/25  0913   *      K 3.3*      CO2 28   BUN 8   CREATININE 0.7   CALCIUM 8.0*   MG 2.2     LFT:  Lab Results   Component Value Date    AST 59 (H) 05/29/2025    ALKPHOS 67 05/29/2025    BILITOT 0.7 05/29/2025     Albumin:   Albumin   Date Value Ref Range Status   05/29/2025 2.3 (L) 3.5 - 5.2 g/dL Final   02/03/2025 3.0 (L) 3.5 - 5.2 g/dL Final     Protein:   Protein Total   Date Value Ref Range Status   05/29/2025 5.3 (L) 6.0 - 8.4 gm/dL Final     Total Protein   Date Value Ref Range Status   02/03/2025 7.6 6.0 - 8.4 g/dL Final     Lactic acid:   Lab Results   Component Value Date    LACTATE 0.8 07/05/2024    LACTATE 1.0 07/04/2024       Significant Imaging: I have reviewed all pertinent imaging results/findings within the past 24 hours.      US Lower Extremity Arteries Left 5/28/25  Impression:     Abnormal velocities and monophasic waveforms throughout the arteries of the left lower extremity suggesting high-grade proximal inflow stenosis.  Overall similar to prior exam 05/19/2024.     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  Vascular surgical consultation suggested.      US Lower Extremity Vein Left 5/28/25  Impression:     No evidence of deep venous thrombosis in the left lower extremity.

## 2025-05-30 NOTE — ASSESSMENT & PLAN NOTE
Patient's most recent potassium results are listed below.   Recent Labs     05/27/25  1935 05/28/25  0345 05/29/25  0806   K 2.8* 4.4 2.9*     Plan  - Replete potassium per protocol  - Monitor potassium Daily  - Replete Mg as well

## 2025-05-30 NOTE — PLAN OF CARE
Problem: Adult Inpatient Plan of Care  Goal: Plan of Care Review  Outcome: Progressing  Goal: Patient-Specific Goal (Individualized)  Outcome: Progressing  Goal: Absence of Hospital-Acquired Illness or Injury  Outcome: Progressing  Goal: Optimal Comfort and Wellbeing  Outcome: Progressing  Goal: Readiness for Transition of Care  Outcome: Progressing     Problem: Gastrointestinal Bleeding  Goal: Optimal Coping with Acute Illness  Outcome: Progressing  Goal: Hemostasis  Outcome: Progressing     Problem: Wound  Goal: Optimal Coping  Outcome: Progressing  Goal: Optimal Functional Ability  Outcome: Progressing  Goal: Absence of Infection Signs and Symptoms  Outcome: Progressing  Goal: Improved Oral Intake  Outcome: Progressing  Goal: Optimal Pain Control and Function  Outcome: Progressing  Goal: Skin Health and Integrity  Outcome: Progressing  Goal: Optimal Wound Healing  Outcome: Progressing     Problem: Fall Injury Risk  Goal: Absence of Fall and Fall-Related Injury  Outcome: Progressing     Problem: Coping Ineffective  Goal: Effective Coping  Outcome: Progressing     Problem: Skin Injury Risk Increased  Goal: Skin Health and Integrity  Outcome: Progressing

## 2025-05-30 NOTE — HPI
"As per H&P, "Jessica Floyd is a 74 y.o. female with a PMHx of  COPD, HFpEF, CAD, ERIK, history of GIB with small bowel resection/ IR embolization last year presenting with weakness and dark stool.     Patient and  at bedside provide history.      They report that this morning, she had a large-volume episode of fecal incontinence, which was "dark like hot fudge" and very watery. She has intermittent dark stools at baseline, however never this voluminous or severe. She has chronic pre-syncopal episodes and experienced another today while on the toilet.  Throughout the day, she has had significant weakness and fatigue, to the point where she had to be carried to the car.  At baseline, she ambulates with a walker, and sometimes gets around with a wheelchair.  They presented to primary care clinic, and she was noted to be hypotensive, improved with IV fluids.  She was therefore sent to the ED for further evaluation"      Palliative Medicine was consulted per primary, Dr. Klein, for goals of care and advanced care planning discussions.  "

## 2025-05-30 NOTE — PROGRESS NOTES
"[facility] - Card[facility] Medicine  Progress Note    Patient Name: Jessica Floyd  MRN: [id number]  Patient Class: IP- Inpatient   Admission Date: 5/27/2025  Length of Stay: 3 days  Attending Physician: Rody Klein MD  Primary Care Provider: Jace Valencia MD        Subjective     Principal Problem:Acute GI bleeding        HPI:  Jessica Floyd is a 74 y.o. female with COPD, HFpEF, CAD, ERIK, history of GIB with small bowel resection/ IR embolization last year presenting with weakness and dark stool.    Patient and  at bedside provide history.     They report that this morning, she had a large-volume episode of fecal incontinence, which was "dark like hot fudge" and very watery. She has intermittent dark stools at baseline, however never this voluminous or severe. She has chronic pre-syncopal episodes and experienced another today while on the toilet.  Throughout the day, she has had significant weakness and fatigue, to the point where she had to be carried to the car.  At baseline, she ambulates with a walker, and sometimes gets around with a wheelchair.  They presented to primary care clinic, and she was noted to be hypotensive, improved with IV fluids.  She was therefore sent to the ED for further evaluation.    She uses oxygen p.r.n. for shortness for breath, but mainly at night.  Denies any NSAID use.   Drinks 1-2 glasses of wine per day.     She denies any abdominal pain, nausea, vomiting, or fever.     Overview/Hospital Course:   74 y.o. female with history of COPD, HFpEF, CAD, ERIK, hx bleeding from the cecum s/p IR embolization May 2024, incarcerated inguinal hernia s/p SB resection (17 cm) May 2024 who was admitted with dark melanotic stool/presyncope.  Hemoglobin 4.9 on presentation status post 2 unit PRBC transfusion hemoglobin improved to 7.8.  CTA negative for GI bleed.  GI was consulted status post EGD on 05/28/2025 with no evidence of active bleed plan for C scope in a.m. required " a dose of Ativan for anxiety resume home Remeron/Seroquel for anxiety..  Noted to have left ankle wound initiate on doxy.  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 There is distal reconstitution   Pending lower extremity arterial and venous ultrasound. Given patient is symptomatic with LLE pain will need IC consult for revascularization? once acute GI bleed issue is resolved    Interval History:     Colonoscopy today. Vascular surgery consulted.    Review of Systems  Objective:     Vital Signs (Most Recent):  Temp: 98.5 °F (36.9 °C) (05/29/25 2346)  Pulse: 91 (05/29/25 2346)  Resp: 17 (05/29/25 2346)  BP: (!) 95/53 (05/29/25 2346)  SpO2: (!) 94 % (05/29/25 2346) Vital Signs (24h Range):  Temp:  [97.3 °F (36.3 °C)-99.1 °F (37.3 °C)] 98.5 °F (36.9 °C)  Pulse:  [] 91  Resp:  [14-68] 17  SpO2:  [92 %-99 %] 94 %  BP: ()/(53-75) 95/53     Weight: 49.9 kg (110 lb)  Body mass index is 19.49 kg/m².    Intake/Output Summary (Last 24 hours) at 5/29/2025 2359  Last data filed at 5/29/2025 2206  Gross per 24 hour   Intake 690 ml   Output --   Net 690 ml         Physical Exam  Cardiovascular:      Rate and Rhythm: Normal rate.      Pulses: Normal pulses.   Pulmonary:      Effort: No respiratory distress.      Breath sounds: Normal breath sounds. No wheezing.   Abdominal:      General: Bowel sounds are normal. There is no distension.      Palpations: Abdomen is soft.   Musculoskeletal:      Right lower leg: No edema.      Left lower leg: No edema.   Skin:     Findings: Lesion (L ankle wound see media for pciture) present.   Neurological:      Mental Status: She is alert and oriented to person, place, and time. Mental status is at baseline.               Significant Labs: All pertinent labs within the past 24 hours have been reviewed.    Significant Imaging: I have reviewed all pertinent imaging results/findings within the past 24  hours.        Assessment & Plan  Acute GI bleeding  admitted with dark melanotic stool/presyncope.  Hemoglobin 4.9 on presentation status post 2 unit PRBC transfusion hemoglobin improved to 7.8.  CTA negative for GI bleed.  GI was consulted status post EGD on 05/28/2025 with no evidence of active bleed plan for C scope in a.m.   Iron deficiency anemia secondary to blood loss (chronic)  Anemia is likely due to Iron deficiency. Most recent hemoglobin and hematocrit are listed below.  Recent Labs     05/28/25  0753 05/28/25  2316 05/29/25  0221   HGB 7.8* 8.2* 7.4*   HCT 24.7* 25.4* 23.2*     Plan  - Monitor serial CBC: as above  - Transfuse PRBC if patient becomes hemodynamically unstable, symptomatic or H/H drops below 7/21.  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 There is distal reconstitution   Pending lower extremity arterial and venous ultrasound. Given patient is symptomatic with LLE pain will need IC consult for revascularization? once acute GI bleed issue is resolved      Coronary artery disease involving native coronary artery of native heart without angina pectoris  Patient with known CAD, which is controlled Will continue Statin, holding aspirin given GI bleed, and monitor for S/Sx of angina/ACS. Continue to monitor on telemetry.   Gastroesophageal reflux disease without esophagitis  Continue PPI as above.    Hypokalemia  Patient's most recent potassium results are listed below.   Recent Labs     05/27/25  1935 05/28/25  0345 05/29/25  0806   K 2.8* 4.4 2.9*     Plan  - Replete potassium per protocol  - Monitor potassium Daily  - Replete Mg as well   Hypertension  Hold home antihypertensives in the setting of initial hypotension with GI bleed.  Pulmonary emphysema  History of COPD, not on home oxygen. Currently without evidence of acute exacerbation, and sats on room  air at goal 88% or greater. Will continue hospital formulary equivalent of home regimen and monitor respiratory status.   (HFpEF) heart failure with preserved ejection fraction  Patient with known diastolic CHF. Currently without evidence of volume overload on exam, and not in acute exacerbation. BNP lower than prior. Will hold home antihypertensives given GIB and initial hypotension.  Appropriate diet ordered. Monitor volume status.       Anxiety  required a dose of Ativan for anxiety resume home Remeron/Seroquel for anxiety..   Hypomagnesemia  Patient has Abnormal Magnesium: hypomagnesemia. Will continue to monitor electrolytes closely. Will replace the affected electrolytes and repeat labs to be done after interventions completed. The patient's magnesium results have been reviewed and are listed below.  Recent Labs   Lab 05/29/25  0806   MG 1.4*      Body mass index (BMI) 19.9 or less, adult      Acute cystitis without hematuria      Aortoiliac occlusive disease      PAD (peripheral artery disease)      VTE Risk Mitigation (From admission, onward)           Ordered     IP VTE HIGH RISK PATIENT  Once         05/28/25 0054     Place sequential compression device  Until discontinued         05/28/25 0054     Reason for No Pharmacological VTE Prophylaxis  Once        Question:  Reasons:  Answer:  Active Bleeding    05/28/25 0054     Place sequential compression device  Until discontinued         05/27/25 2231                    Discharge Planning   DEBBIE: 5/31/2025     Code Status: Full Code   Medical Readiness for Discharge Date:   Discharge Plan A: Home with family                        Rody Klein MD  Department of Hospital Medicine   [facility] - Cardiology Stepdown

## 2025-05-30 NOTE — TREATMENT PLAN
GI Treatment Plan    Colonoscopy 5/29  Impression:              - Diverticulosis in the sigmoid colon and in the                          descending colon.                          - One bleeding colonic angioectasia. Treated with                          argon plasma coagulation (APC).                          - One 10 mm polyp in the ascending colon.                          Resection not attempted.                          - One non-bleeding colonic angioectasia. Treated                          with argon plasma coagulation (APC).                          - The examination was otherwise normal on direct                          and retroflexion views.                          - No specimens collected.                          - Increased procedural services (modifier .22) due                          to difficulty and complexity requiring use of                          specialized equipment.   Recommendation:          - Return patient to hospital [facility] for ongoing care.                          - Advance diet as tolerated.                          - Continue present medications.                          - No recommendation at this time regarding repeat                          colonoscopy.       GI will sign off.     Pamela Dixon MD  Gastroenterology and Hepatology Fellow, PGY-4

## 2025-05-30 NOTE — ASSESSMENT & PLAN NOTE
Anemia is likely due to Iron deficiency. Most recent hemoglobin and hematocrit are listed below.  Recent Labs     05/28/25  0753 05/28/25  2316 05/29/25  0221   HGB 7.8* 8.2* 7.4*   HCT 24.7* 25.4* 23.2*     Plan  - Monitor serial CBC: as above  - Transfuse PRBC if patient becomes hemodynamically unstable, symptomatic or H/H drops below 7/21.  - s/p 2U pRBC transfusion on 05/27

## 2025-05-30 NOTE — CONSULTS
[facility]  Palliative Medicine  Consult Note    Patient Name: Jessica Floyd  MRN: [id number]  Admission Date: 5/27/2025  Hospital Length of Stay: 3 days  Code Status: Full Code   Attending Provider: Rody Klein MD  Consulting Provider: Suellen Brown NP  Primary Care Physician: Jace Valencia MD  Principal Problem:Acute GI bleeding    Patient information was obtained from patient, relative(s), past medical records, and primary team.      Inpatient consult to Palliative Care  Consult performed by: Suellen Brown NP  Consult ordered by: Rody Klein MD  Reason for consult: goals of care and advanced care planning discussions        Assessment/Plan:     Palliative Care  Palliative care encounter  Impression:  Jessica Floyd is a 74 y.o. female with PMHx of COPD, HFpEF, CAD, ERIK, history of GIB with small bowel resection/ IR embolization in May 2024 that presented to ED on 5/27/25 with complaints of weakness and increased episodes of dark stools. Palliative Medicine was consulted by primary, Dr. Klein, for goals of care and advanced care planning discussions.    Patient is currently resting in bed, supplemental O2 at 2L. Patient is AAOx4, daughter (Raimla Floyd) at bedside, attentive to patient's needs. Patient and daughter are amenable to Palliative Medicine.     - Prior experience with serious illness: yes  -The patient has not previously engaged in advance care planning or C discussions  - Insight/Understanding of illness: TBD      Advance Care Planning    Date: 05/30/2025    GOC  I engaged the patient and family in a voluntary conversation about advance care planning and we specifically addressed what the goals of care would be moving forward, in light of the patient's change in clinical status, specifically debility, frailty, GIB, and current hospitalization.  We did not specifically address the patient's likely prognosis, which appears to be fair; pending discussion from vascular  surgery.  We explored the patient's values and preferences for future care.  The patient endorses that what is most important right now is to focus on spending time at home, avoiding the hospital, remaining as independent as possible, symptom/pain control, and quality of life, even if it means sacrificing a little time. Ramila expressed concerns of patient's quality of life as patient has been dealing with GIB for over a year and need for additional resources at home. I provided patient and family with list of in-home care agencies [facility].    Accordingly, we have decided that the best plan to meet the patient's goals includes continuing with treatment and allowing for additional time for goals of care discussions             Life Limiting Diagnosis  GIB with small bowel resection/ IR embolization   - GI following  Anemia   - Serial CBCs   - monitor per primary  COPD   - on home O2        Symptom Management  - Pain: reports intermittent discomfort to left leg. Patient describes pain as intermittent sharp/cramping pain that started about 3 days ago. Patient rates pain 7-8. No aggravating factors reported. Patient reports discomfort was relieved with 500mg methocarbamol.  - continue management per primary team    - Dyspnea: no  - 24h SpO2: 92-99%  - on home O2 intermittently  - currently maintained on 2L O2     - Constipation: no  - diarrhea, frequent dark stools  - GI following    - Nausea: no  - ondansetron 8mg q8h PRN    - Debility: yes  - Functional status: poor.  Pt was not able to manage ADLs  - Fall precautions  - PT/OT eval and treat    - Insomnia: yes  - melatonin 6mg PRN        Recommendations  - Please see above  - Continue management per primary team  - Patient and family would benefit from continued education and information regarding plan of care, prognosis, and what to expect in the future  - Most important goals at this time: getting more information from vascular surgery regarding  "treatment options   - Code status: Full Code   - Disposition: home        The above recommendations communicated directly to primary team on 5/30/25  Thank you for consulting Palliative Medicine.                Thank you for your consult. I will follow-up with patient. Please contact us if you have any additional questions.    Subjective:     HPI:   As per H&P, "Jessica Floyd is a 74 y.o. female with a PMHx of  COPD, HFpEF, CAD, ERIK, history of GIB with small bowel resection/ IR embolization last year presenting with weakness and dark stool.     Patient and  at bedside provide history.      They report that this morning, she had a large-volume episode of fecal incontinence, which was "dark like hot fudge" and very watery. She has intermittent dark stools at baseline, however never this voluminous or severe. She has chronic pre-syncopal episodes and experienced another today while on the toilet.  Throughout the day, she has had significant weakness and fatigue, to the point where she had to be carried to the car.  At baseline, she ambulates with a walker, and sometimes gets around with a wheelchair.  They presented to primary care clinic, and she was noted to be hypotensive, improved with IV fluids.  She was therefore sent to the ED for further evaluation"      Palliative Medicine was consulted per primary, Dr. Klein, for goals of care and advanced care planning discussions.    Hospital Course:  No notes on file    Interval History: Palliative Medicine introduced to patient and daughter. Initiation of goals of care discussions.    Past Medical History:   Diagnosis Date    Allergic rhinitis     Amblyopia     Carotid stenosis     Coronary atherosclerosis     Outside [facility] 6/2014: 20% mLAD, 50% mCx, 20%, mRCA    COVID-19 11/15/2023    Dyslipidemia     ESBL (extended spectrum beta-lactamase) producing bacteria infection     UTI    Hypertension     Influenza A 12/28/2023    Nausea and vomiting 05/26/2017    " Pulmonary emphysema 10/28/2023    SAH (subarachnoid hemorrhage) 08/24/2016 1999, s/p clipping    Subarachnoid hemorrhage due to ruptured aneurysm 1999       Past Surgical History:   Procedure Laterality Date    ADENOIDECTOMY      ANTERIOR CRUCIATE LIGAMENT REPAIR  2012    APPENDECTOMY      CATARACT EXTRACTION W/  INTRAOCULAR LENS IMPLANT Right 11/07/2022    Procedure: EXTRACTION, CATARACT, WITH IOL INSERTION;  Surgeon: Higinio Cristina MD;  Location: [facility], [place], operated by [facility] OR;  Service: Ophthalmology;  Laterality: Right;    CATARACT EXTRACTION W/  INTRAOCULAR LENS IMPLANT Left 11/21/2022    Procedure: EXTRACTION, CATARACT, WITH IOL INSERTION;  Surgeon: Higinio Cristina MD;  Location: [facility], [place], operated by [facility] OR;  Service: Ophthalmology;  Laterality: Left;    CEREBRAL ANEURYSM REPAIR  1999    clip    COLONOSCOPY N/A 5/16/2024    Procedure: COLONOSCOPY;  Surgeon: Rich Syed MD;  Location: [facility] ENDO (2ND FLR);  Service: Endoscopy;  Laterality: N/A;    COLONOSCOPY, WITH RETROGRADE BALLOON ENTEROSCOPY, SINGLE OR DOUBLE BALLOON N/A 5/29/2025    Procedure: COLONOSCOPY, WITH RETROGRADE BALLOON ENTEROSCOPY, SINGLE OR DOUBLE BALLOON;  Surgeon: Rcih Syed MD;  Location: [facility] ENDO (2ND FLR);  Service: Endoscopy;  Laterality: N/A;    DENTAL SURGERY      DIAGNOSTIC LAPAROSCOPY N/A 5/17/2024    Procedure: LAPAROSCOPY, DIAGNOSTIC;  Surgeon: Ruben Oscar MD;  Location: [facility] OR 2ND FLR;  Service: General;  Laterality: N/A;    ESOPHAGOGASTRODUODENOSCOPY N/A 5/15/2024    Procedure: EGD (ESOPHAGOGASTRODUODENOSCOPY);  Surgeon: Rich Syed MD;  Location: [facility] ENDO (2ND FLR);  Service: Endoscopy;  Laterality: N/A;    ESOPHAGOGASTRODUODENOSCOPY N/A 5/28/2025    Procedure: EGD (ESOPHAGOGASTRODUODENOSCOPY);  Surgeon: Rich Syed MD;  Location: [facility] ENDO (2ND FLR);  Service: Endoscopy;  Laterality: N/A;    EXCISION, SMALL INTESTINE N/A 5/17/2024    Procedure: EXCISION, SMALL INTESTINE;  Surgeon: Ruben Oscar MD;  Location: [facility] OR [facility]  FLR;  Service: General;  Laterality: N/A;    EYE SURGERY Bilateral     cataract removal and lens implants    HIP ARTHROPLASTY Left 05/28/2023    Procedure: TOTAL HIP ARTHROPLASTY;  Surgeon: Juan Wan MD;  [facility];  Service: Orthopedics;  Laterality: Left;    LAPAROTOMY, EXPLORATORY N/A 5/17/2024    Procedure: LAPAROTOMY, EXPLORATORY;  Surgeon: Ruben Oscar MD;  [facility];  Service: General;  Laterality: N/A;    REPAIR, HERNIA, INGUINAL Bilateral 5/17/2024    Procedure: REPAIR, HERNIA, INGUINAL;  Surgeon: Ruben Ocsar MD;  [facility];  Service: General;  Laterality: Bilateral;    TONSILLECTOMY         Review of patient's allergies indicates:   Allergen Reactions    Hydromorphone Anxiety, Other (See Comments) and Hallucinations     Severe agitation       Medications:  Continuous Infusions:  Scheduled Meds:   allopurinoL  300 mg Oral Daily    atorvastatin  80 mg Oral Daily    doxycycline  100 mg Oral Q12H    fluticasone furoate-vilanteroL  1 puff Inhalation Daily    midazolam  2 mg Intravenous Once    mirtazapine  7.5 mg Oral QHS    pantoprazole  40 mg Intravenous Q12H    QUEtiapine  50 mg Oral QHS     PRN Meds:  Current Facility-Administered Medications:     0.9%  NaCl infusion (for blood administration), , Intravenous, Q24H PRN    acetaminophen, 650 mg, Oral, Q4H PRN    albuterol-ipratropium, 3 mL, Nebulization, Q6H PRN    aluminum-magnesium hydroxide-simethicone, 30 mL, Oral, QID PRN    fentaNYL, 25 mcg, Intravenous, Q5 Min PRN    glucagon (human recombinant), 1 mg, Intramuscular, PRN    glucagon (human recombinant), 1 mg, Intramuscular, PRN    glucose, 16 g, Oral, PRN    glucose, 24 g, Oral, PRN    melatonin, 6 mg, Oral, Nightly PRN    naloxone, 0.02 mg, Intravenous, PRN    ondansetron, 8 mg, Oral, Q8H PRN    ondansetron, 4 mg, Intravenous, Daily PRN    polyethylene glycol, 17 g, Oral, Daily PRN    simethicone, 1 tablet, Oral, QID  PRN    sodium chloride 0.9%, 1-10 mL, Intravenous, Q12H PRN    Family History       Problem Relation (Age of Onset)    Alcohol abuse Father    Aneurysm Mother    Gout Brother    Heart disease Brother    Hypertension Mother, Father    Kidney disease Brother    No Known Problems Daughter, Daughter, Daughter          Tobacco Use    Smoking status: Former     Current packs/day: 0.00     Average packs/day: 0.5 packs/day for 20.0 years (10.0 ttl pk-yrs)     Types: Cigarettes     Start date: 1979     Quit date: 1999     Years since quittin.4     Passive exposure: Past    Smokeless tobacco: Never   Substance and Sexual Activity    Alcohol use: Yes     Alcohol/week: 14.0 standard drinks of alcohol     Types: 14 Glasses of wine per week     Comment: 1 or 2 glasses of red wine    Drug use: No    Sexual activity: Yes     Partners: Male       Review of Systems   Constitutional:  Positive for activity change and fatigue.   Respiratory:  Positive for shortness of breath.    Gastrointestinal:  Positive for diarrhea.   Skin:  Positive for wound.   Neurological:  Positive for weakness.   Psychiatric/Behavioral:  The patient is nervous/anxious.      Objective:     Vital Signs (Most Recent):  Temp: 98.3 °F (36.8 °C) (25 0736)  Pulse: 86 (25 0836)  Resp: 17 (25 0836)  BP: 120/61 (25 0736)  SpO2: 95 % (25 0736) Vital Signs (24h Range):  Temp:  [97.3 °F (36.3 °C)-98.6 °F (37 °C)] 98.3 °F (36.8 °C)  Pulse:  [77-92] 86  Resp:  [14-68] 17  SpO2:  [92 %-99 %] 95 %  BP: ()/(53-68) 120/61     Weight: 49.9 kg (110 lb)  Body mass index is 19.49 kg/m².       Physical Exam  Vitals and nursing note reviewed.   Constitutional:       General: She is not in acute distress.     Appearance: She is ill-appearing.   HENT:      Head: Normocephalic and atraumatic.      Nose: Nose normal.      Mouth/Throat:      Mouth: Mucous membranes are dry.   Eyes:      Extraocular Movements: Extraocular movements intact.    Cardiovascular:      Rate and Rhythm: Normal rate.   Pulmonary:      Effort: Pulmonary effort is normal.      Comments: On intermittent home O2  Abdominal:      Palpations: Abdomen is soft.   Skin:     General: Skin is warm.      Coloration: Skin is pale.   Neurological:      Mental Status: She is alert and oriented to person, place, and time.      Motor: Weakness present.            Review of Symptoms      Symptom Assessment (ESAS 0-10 Scale)  Pain:  0  Dyspnea:  0  Anxiety:  0  Nausea:  0  Depression:  0  Anorexia:  0  Fatigue:  0  Insomnia:  0  Restlessness:  0  Agitation:  0       Anxiety:  Is nervous/anxious    Living Arrangements:  Lives with spouse    Psychosocial/Cultural:   See Palliative Psychosocial Note: Yes  -  for over 50 years  - 3 daughters, 4 grandchildren  - now retired. Previously worked for [employer] and a [employer]  - enjoys spending time with her family and friends  **Primary  to Follow**  Palliative Care  Consult: Yes        Advance Care Planning  Advance Directives:   Living Will: No    LaPOST: No    Do Not Resuscitate Status: No    Medical Power of : No      Decision Making:  Patient answered questions  Goals of Care: The patient and family endorses that what is most important right now is to focus on remaining as independent as possible, symptom/pain control, and quality of life, even if it means sacrificing a little time    Accordingly, we have decided that the best plan to meet the patient's goals includes continuing with treatment, meeting with vascular surgery, and allowing for additional time for goal of care discussions         Significant Labs: All pertinent labs within the past 24 hours have been reviewed.  CBC:   Recent Labs   Lab 05/30/25  0245   WBC 15.68*   HGB 7.7*   HCT 25.5*   MCV 98        BMP:  Recent Labs   Lab 05/30/25  0913   *      K 3.3*      CO2 28   BUN 8   CREATININE 0.7   CALCIUM 8.0*    MG 2.2     LFT:  Lab Results   Component Value Date    AST 59 (H) 05/29/2025    ALKPHOS 67 05/29/2025    BILITOT 0.7 05/29/2025     Albumin:   Albumin   Date Value Ref Range Status   05/29/2025 2.3 (L) 3.5 - 5.2 g/dL Final   02/03/2025 3.0 (L) 3.5 - 5.2 g/dL Final     Protein:   Protein Total   Date Value Ref Range Status   05/29/2025 5.3 (L) 6.0 - 8.4 gm/dL Final     Total Protein   Date Value Ref Range Status   02/03/2025 7.6 6.0 - 8.4 g/dL Final     Lactic acid:   Lab Results   Component Value Date    LACTATE 0.8 07/05/2024    LACTATE 1.0 07/04/2024       Significant Imaging: I have reviewed all pertinent imaging results/findings within the past 24 hours.      US Lower Extremity Arteries Left 5/28/25  Impression:     Abnormal velocities and monophasic waveforms throughout the arteries of the left lower extremity suggesting high-grade proximal inflow stenosis.  Overall similar to prior exam 05/19/2024.     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  Vascular surgical consultation suggested.      US Lower Extremity Vein Left 5/28/25  Impression:     No evidence of deep venous thrombosis in the left lower extremity.    I spent a total of 75 minutes on the day of the visit. This includes face to face time in discussion of goals of care, symptom assessment, coordination of care and emotional support.  This also includes non-face to face time preparing to see the patient (eg, review of tests/imaging), obtaining and/or reviewing separately obtained history, documenting clinical information in the electronic or other health record, independently interpreting results and communicating results to the patient/family/caregiver, or care coordinator.      Additional 46min time spent on a voluntary advance care planning and /or goals of care discussion, providing emotional support,  formulating, and communicating prognosis and exploring burden/benefit of various approaches of treatment.       Suellen Brown NP  Palliative Medicine  [facility]

## 2025-05-30 NOTE — PLAN OF CARE
Alert and oriented X4, tylenol prn given for left ankle pain, comfort measures provided, VS WNL, plan of care explained to patient and . Answered all questions and concerns post procedure, call light within reach, will continue to monitor.    Problem: Adult Inpatient Plan of Care  Goal: Plan of Care Review  Outcome: Progressing  Goal: Readiness for Transition of Care  Outcome: Progressing     Problem: Gastrointestinal Bleeding  Goal: Optimal Coping with Acute Illness  Outcome: Progressing     Problem: Wound  Goal: Optimal Coping  Outcome: Progressing

## 2025-05-30 NOTE — PLAN OF CARE
05/30/25 1400   Discharge Reassessment   Assessment Type Discharge Planning Reassessment   Did the patient's condition or plan change since previous assessment? Yes   Discharge Plan discussed with: Patient;Spouse/sig other   Discharge Plan A Home Health;Home with family   Discharge Plan B Hospice/home   DME Needed Upon Discharge  none   Transition of Care Barriers None   Why the patient remains in the hospital Requires continued medical care   Post-Acute Status   Discharge Delays None known at this time

## 2025-05-31 VITALS
SYSTOLIC BLOOD PRESSURE: 118 MMHG | DIASTOLIC BLOOD PRESSURE: 67 MMHG | OXYGEN SATURATION: 93 % | HEIGHT: 63 IN | BODY MASS INDEX: 19.49 KG/M2 | HEART RATE: 91 BPM | RESPIRATION RATE: 18 BRPM | WEIGHT: 110 LBS | TEMPERATURE: 99 F

## 2025-05-31 LAB
ABSOLUTE EOSINOPHIL (OHS): 0.22 K/UL
ABSOLUTE MONOCYTE (OHS): 3.55 K/UL (ref 0.3–1)
ABSOLUTE NEUTROPHIL COUNT (OHS): 4.58 K/UL (ref 1.8–7.7)
ALBUMIN SERPL BCP-MCNC: 2.2 G/DL (ref 3.5–5.2)
ALP SERPL-CCNC: 65 UNIT/L (ref 40–150)
ALT SERPL W/O P-5'-P-CCNC: 21 UNIT/L (ref 10–44)
ANION GAP (OHS): 9 MMOL/L (ref 8–16)
AST SERPL-CCNC: 47 UNIT/L (ref 11–45)
BASOPHILS # BLD AUTO: 0.21 K/UL
BASOPHILS NFR BLD AUTO: 2 %
BILIRUB SERPL-MCNC: 0.6 MG/DL (ref 0.1–1)
BUN SERPL-MCNC: 9 MG/DL (ref 8–23)
CALCIUM SERPL-MCNC: 7.6 MG/DL (ref 8.7–10.5)
CHLORIDE SERPL-SCNC: 101 MMOL/L (ref 95–110)
CO2 SERPL-SCNC: 27 MMOL/L (ref 23–29)
CREAT SERPL-MCNC: 0.8 MG/DL (ref 0.5–1.4)
ERYTHROCYTE [DISTWIDTH] IN BLOOD BY AUTOMATED COUNT: 18.9 % (ref 11.5–14.5)
GFR SERPLBLD CREATININE-BSD FMLA CKD-EPI: >60 ML/MIN/1.73/M2
GLUCOSE SERPL-MCNC: 84 MG/DL (ref 70–110)
HCT VFR BLD AUTO: 24.4 % (ref 37–48.5)
HGB BLD-MCNC: 7.4 GM/DL (ref 12–16)
IMM GRANULOCYTES # BLD AUTO: 0.42 K/UL (ref 0–0.04)
IMM GRANULOCYTES NFR BLD AUTO: 3.9 % (ref 0–0.5)
LYMPHOCYTES # BLD AUTO: 1.71 K/UL (ref 1–4.8)
MAGNESIUM SERPL-MCNC: 1.6 MG/DL (ref 1.6–2.6)
MCH RBC QN AUTO: 30.2 PG (ref 27–31)
MCHC RBC AUTO-ENTMCNC: 30.3 G/DL (ref 32–36)
MCV RBC AUTO: 100 FL (ref 82–98)
NUCLEATED RBC (/100WBC) (OHS): 0 /100 WBC
OHS QRS DURATION: 88 MS
OHS QTC CALCULATION: 433 MS
PHOSPHATE SERPL-MCNC: 3.2 MG/DL (ref 2.7–4.5)
PLATELET # BLD AUTO: 377 K/UL (ref 150–450)
PMV BLD AUTO: 9.6 FL (ref 9.2–12.9)
POCT GLUCOSE: 112 MG/DL (ref 70–110)
POCT GLUCOSE: 151 MG/DL (ref 70–110)
POTASSIUM SERPL-SCNC: 3.5 MMOL/L (ref 3.5–5.1)
PROT SERPL-MCNC: 5.1 GM/DL (ref 6–8.4)
RBC # BLD AUTO: 2.45 M/UL (ref 4–5.4)
RELATIVE EOSINOPHIL (OHS): 2.1 %
RELATIVE LYMPHOCYTE (OHS): 16 % (ref 18–48)
RELATIVE MONOCYTE (OHS): 33.2 % (ref 4–15)
RELATIVE NEUTROPHIL (OHS): 42.8 % (ref 38–73)
SODIUM SERPL-SCNC: 137 MMOL/L (ref 136–145)
WBC # BLD AUTO: 10.69 K/UL (ref 3.9–12.7)

## 2025-05-31 PROCEDURE — 80053 COMPREHEN METABOLIC PANEL: CPT | Mod: NTX

## 2025-05-31 PROCEDURE — 97530 THERAPEUTIC ACTIVITIES: CPT | Mod: NTX

## 2025-05-31 PROCEDURE — 85025 COMPLETE CBC W/AUTO DIFF WBC: CPT | Mod: NTX | Performed by: STUDENT IN AN ORGANIZED HEALTH CARE EDUCATION/TRAINING PROGRAM

## 2025-05-31 PROCEDURE — 25000003 PHARM REV CODE 250: Mod: NTX | Performed by: STUDENT IN AN ORGANIZED HEALTH CARE EDUCATION/TRAINING PROGRAM

## 2025-05-31 PROCEDURE — 25000003 PHARM REV CODE 250: Mod: NTX

## 2025-05-31 PROCEDURE — 97165 OT EVAL LOW COMPLEX 30 MIN: CPT | Mod: NTX

## 2025-05-31 PROCEDURE — 99900035 HC TECH TIME PER 15 MIN (STAT): Mod: NTX

## 2025-05-31 PROCEDURE — 27000221 HC OXYGEN, UP TO 24 HOURS: Mod: NTX

## 2025-05-31 PROCEDURE — 97535 SELF CARE MNGMENT TRAINING: CPT | Mod: NTX

## 2025-05-31 PROCEDURE — 83735 ASSAY OF MAGNESIUM: CPT | Mod: NTX

## 2025-05-31 PROCEDURE — 84100 ASSAY OF PHOSPHORUS: CPT | Mod: NTX

## 2025-05-31 PROCEDURE — 63600175 PHARM REV CODE 636 W HCPCS: Mod: NTX

## 2025-05-31 PROCEDURE — 94761 N-INVAS EAR/PLS OXIMETRY MLT: CPT | Mod: NTX

## 2025-05-31 PROCEDURE — 94640 AIRWAY INHALATION TREATMENT: CPT | Mod: NTX

## 2025-05-31 PROCEDURE — 97162 PT EVAL MOD COMPLEX 30 MIN: CPT | Mod: NTX

## 2025-05-31 PROCEDURE — 36415 COLL VENOUS BLD VENIPUNCTURE: CPT | Mod: NTX | Performed by: STUDENT IN AN ORGANIZED HEALTH CARE EDUCATION/TRAINING PROGRAM

## 2025-05-31 RX ORDER — SULFAMETHOXAZOLE AND TRIMETHOPRIM 400; 80 MG/1; MG/1
1 TABLET ORAL 2 TIMES DAILY
Qty: 6 TABLET | Refills: 0 | Status: SHIPPED | OUTPATIENT
Start: 2025-05-31 | End: 2025-06-03

## 2025-05-31 RX ORDER — GABAPENTIN 300 MG/1
300 CAPSULE ORAL 3 TIMES DAILY
Qty: 90 CAPSULE | Refills: 0 | Status: SHIPPED | OUTPATIENT
Start: 2025-05-31 | End: 2025-06-30

## 2025-05-31 RX ORDER — METHOCARBAMOL 500 MG/1
500 TABLET, FILM COATED ORAL EVERY 6 HOURS PRN
Qty: 30 TABLET | Refills: 0 | Status: SHIPPED | OUTPATIENT
Start: 2025-05-31 | End: 2025-06-10

## 2025-05-31 RX ORDER — ASPIRIN 81 MG/1
81 TABLET ORAL DAILY
Qty: 30 TABLET | Refills: 11 | Status: SHIPPED | OUTPATIENT
Start: 2025-06-01 | End: 2026-06-01

## 2025-05-31 RX ORDER — MAGNESIUM SULFATE HEPTAHYDRATE 40 MG/ML
2 INJECTION, SOLUTION INTRAVENOUS ONCE
Status: COMPLETED | OUTPATIENT
Start: 2025-05-31 | End: 2025-05-31

## 2025-05-31 RX ADMIN — SODIUM CHLORIDE, POTASSIUM CHLORIDE, SODIUM LACTATE AND CALCIUM CHLORIDE 1000 ML: 600; 310; 30; 20 INJECTION, SOLUTION INTRAVENOUS at 11:05

## 2025-05-31 RX ADMIN — GABAPENTIN 300 MG: 300 CAPSULE ORAL at 03:05

## 2025-05-31 RX ADMIN — MAGNESIUM SULFATE HEPTAHYDRATE 2 G: 40 INJECTION, SOLUTION INTRAVENOUS at 11:05

## 2025-05-31 RX ADMIN — METHOCARBAMOL 500 MG: 500 TABLET ORAL at 11:05

## 2025-05-31 RX ADMIN — ATORVASTATIN CALCIUM 80 MG: 40 TABLET, FILM COATED ORAL at 09:05

## 2025-05-31 RX ADMIN — ALLOPURINOL 300 MG: 100 TABLET ORAL at 09:05

## 2025-05-31 RX ADMIN — FLUTICASONE FUROATE AND VILANTEROL TRIFENATATE 1 PUFF: 100; 25 POWDER RESPIRATORY (INHALATION) at 08:05

## 2025-05-31 RX ADMIN — ASPIRIN 81 MG: 81 TABLET, COATED ORAL at 09:05

## 2025-05-31 RX ADMIN — GABAPENTIN 300 MG: 300 CAPSULE ORAL at 09:05

## 2025-05-31 RX ADMIN — DOXYCYCLINE HYCLATE 100 MG: 100 TABLET, COATED ORAL at 09:05

## 2025-05-31 NOTE — ASSESSMENT & PLAN NOTE
Patient has Abnormal Magnesium: hypomagnesemia. Will continue to monitor electrolytes closely. Will replace the affected electrolytes and repeat labs to be done after interventions completed. The patient's magnesium results have been reviewed and are listed below.  Recent Labs   Lab 05/30/25  0913   MG 2.2

## 2025-05-31 NOTE — PLAN OF CARE
[facility] - Cardiology Stepdown      HOME HEALTH ORDERS  FACE TO FACE ENCOUNTER    Patient Name: Jessica Floyd  YOB: 1950    PCP: Jace Valencia MD   PCP Address: [address] / [place] [address]  PCP Phone Number: [phone]  PCP Fax: [phone]    Encounter Date: 5/27/25    Admit to Home Health    Diagnoses:  Active Hospital Problems    Diagnosis  POA    *Acute GI bleeding [K92.2]  Yes     Chronic    Palliative care encounter [Z51.5]  Not Applicable    Acute cystitis without hematuria [N30.00]  Yes     Urine culture with GNR, sensitivity and susceptibility pending   WBC peaked at 14, down trending   Afebrile      Aortoiliac occlusive disease [I74.09]  Yes    PAD (peripheral artery disease) [I73.9]  Yes    Hypomagnesemia [E83.42]  Yes     Chronic    Body mass index (BMI) 19.9 or less, adult [Z68.1]  Yes    Abrasion [T14.8XXA]  Yes     Chronic    (HFpEF) heart failure with preserved ejection fraction [I50.30]  Yes     Chronic    Pulmonary emphysema [J43.9]  Yes     Chronic    Iron deficiency anemia secondary to blood loss (chronic) [D50.0]  Yes     Chronic    Hypertension [I10]  Yes     Chronic    Hypokalemia [E87.6]  Yes     Chronic    Gastroesophageal reflux disease without esophagitis [K21.9]  Yes     Chronic    Coronary artery disease involving native coronary artery of native heart without angina pectoris [I25.10]  Yes     Chronic     Outside [facility] 2014:  mLAD 20%  mCx 50%  mRCA 20%        Resolved Hospital Problems   No resolved problems to display.       Follow Up Appointments:  Future Appointments   Date Time Provider Department Center   6/6/2025  8:00 AM Rafael Preston MD [facility] GASTRO [facility]   6/26/2025  9:15 AM PULMONARY FUNCTION [facility] PULMLAB [facility]   6/26/2025  9:30 AM PULMONARY FUNCTION [facility] PULMLAB [facility]   6/26/2025  9:45 AM PULMONARY FUNCTION [facility] PULMLAB [facility]   6/26/2025 10:00 AM SIX, MINUTE WALK [facility] PUL WLK [facility]   6/26/2025 10:30 AM Ashli Blackwood MD [facility]  LUNGTX [address]   7/8/2025  1:40 PM Emery Shafer, VIKTOR [facility] OPTOMTY [facility]   7/25/2025 11:30 AM LAB, [facility] LABDRAW [facility]   7/28/2025 10:00 AM [facility] DEXA1 [facility] BMD [facility]   7/29/2025  9:00 AM Agata Rolon MD [facility] HEM ONC [facility]   9/16/2025 10:20 AM Michael Lal MD [facility] [id number] [facility]       Allergies:  Review of patient's allergies indicates:   Allergen Reactions    Hydromorphone Anxiety, Other (See Comments) and Hallucinations     Severe agitation       Medications: Review discharge medications with patient and family and provide education.    Current Medications[1]     Medication List        PAUSE taking these medications      amLODIPine 5 MG tablet  Wait to take this until your doctor or other care provider tells you to start again.  Commonly known as: NORVASC  Take 1 tablet (5 mg total) by mouth once daily.     carvediloL 6.25 MG tablet  Wait to take this until your doctor or other care provider tells you to start again.  Commonly known as: COREG  Take 1 tablet (6.25 mg total) by mouth 2 (two) times daily.     hydroCHLOROthiazide 25 MG tablet  Wait to take this until your doctor or other care provider tells you to start again.  Commonly known as: HYDRODIURIL  Take 1 tablet (25 mg total) by mouth once daily.            START taking these medications      aspirin 81 MG EC tablet  Commonly known as: ECOTRIN  Take 1 tablet (81 mg total) by mouth once daily.  Start taking on: June 1, 2025     gabapentin 300 MG capsule  Commonly known as: NEURONTIN  Take 1 capsule (300 mg total) by mouth 3 (three) times daily.     methocarbamoL 500 MG Tab  Commonly known as: Robaxin  Take 1 tablet (500 mg total) by mouth every 6 (six) hours as needed (for muscle spasms of legs).     sulfamethoxazole-trimethoprim 400-80mg 400-80 mg per tablet  Commonly known as: BACTRIM,SEPTRA  Take 1 tablet by mouth 2 (two) times daily. for 3 days            CONTINUE taking these medications      allopurinoL  300 MG tablet  Commonly known as: ZYLOPRIM  Take 300 mg by mouth once daily.     atorvastatin 80 MG tablet  Commonly known as: LIPITOR  TAKE 1 TABLET BY MOUTH EVERY DAY     celecoxib 200 MG capsule  Commonly known as: CeleBREX  TAKE 1 CAPSULE BY MOUTH DAILY AS NEEDED FOR PAIN.     colchicine 0.6 mg tablet  Commonly known as: COLCRYS  TAKE 1 TABLET (0.6 MG TOTAL) BY MOUTH ONCE DAILY. AS NEEDED FOR GOUT     cyanocobalamin 1,000 mcg/mL injection  Inject 1,000 mcg into the muscle every 7 days.     fluticasone propionate 50 mcg/actuation nasal spray  Commonly known as: FLONASE  2 sprays (100 mcg total) by Each Nostril route once daily.     fluticasone-umeclidin-vilanter 200-62.5-25 mcg inhaler  Commonly known as: TRELEGY ELLIPTA  Inhale 1 puff into the lungs once daily.     LIDOcaine 4 % Gel  Apply 1 g topically 2 (two) times daily as needed (foot pain).     mirtazapine 7.5 MG Tab  Commonly known as: REMERON  Take 1 tablet (7.5 mg total) by mouth every evening.     montelukast 10 mg tablet  Commonly known as: SINGULAIR  Take 10 mg by mouth every evening.     pantoprazole 40 MG tablet  Commonly known as: PROTONIX  Take 1 tablet (40 mg total) by mouth 2 (two) times daily.     syringe (disposable) 1 mL Syrg  1 Stick by Misc.(Non-Drug; Combo Route) route once a week.            STOP taking these medications      erythromycin ophthalmic ointment  Commonly known as: ROMYCIN            ASK your doctor about these medications      LIDOcaine-prilocaine cream  Commonly known as: EMLA  Apply topically as needed.     QUEtiapine 50 MG tablet  Commonly known as: SEROQUEL  Take 1 tablet (50 mg total) by mouth 2 (two) times daily.                I have seen and examined this patient within the last 30 days. My clinical findings that support the need for the home health skilled services and home bound status are the following:no   Weakness/numbness causing balance and gait disturbance due to COPD Exacerbation and Weakness/Debility making  it taxing to leave home.  Requiring assistive device to leave home due to unsteady gait caused by  wounds.  Medical restrictions requiring assistance of another human to leave home due to  Dyspnea on exertion (SOB), Unstable ambulation, Frequent Falls, Wound care needs, and Home oxygen requirement.     Diet:   regular diet    Labs:  None needed     Referrals/ Consults  Physical Therapy to evaluate and treat. Evaluate for home safety and equipment needs; Establish/upgrade home exercise program. Perform / instruct on therapeutic exercises, gait training, transfer training, and Range of Motion.  Occupational Therapy to evaluate and treat. Evaluate home environment for safety and equipment needs. Perform/Instruct on transfers, ADL training, ROM, and therapeutic exercises.  Aide to provide assistance with personal care, ADLs, and vital signs.    Activities:   activity as tolerated    Nursing:   Agency to admit patient within 24 hours of hospital discharge unless specified on physician order or at patient request    SN to complete comprehensive assessment including routine vital signs. Instruct on disease process and s/s of complications to report to MD. Review/verify medication list sent home with the patient at time of discharge  and instruct patient/caregiver as needed. Frequency may be adjusted depending on start of care date.     Skilled nurse to perform up to 3 visits PRN for symptoms related to diagnosis    Notify MD if SBP > 160 or < 90; DBP > 90 or < 50; HR > 120 or < 50; Temp > 101; O2 < 88%;     Ok to schedule additional visits based on staff availability and patient request on consecutive days within the home health episode.    When multiple disciplines ordered:    Start of Care occurs on Sunday - Wednesday schedule remaining discipline evaluations as ordered on separate consecutive days following the start of care.    Thursday SOC -schedule subsequent evaluations Friday and Monday the following week.      Friday - Saturday SOC - schedule subsequent discipline evaluations on consecutive days starting Monday of the following week.    For all post-discharge communication and subsequent orders please contact patient's primary care physician.     Miscellaneous   Wound Care    Home Health Aide:  Nursing Three times weekly, Physical Therapy Three times weekly, Occupational Therapy Three times weekly, and Home Health Aide Three times weekly    Wound Care Orders  no    Patient is being referred to [facility] for In home Wound Care services     I certify that this patient is confined to her home and needs intermittent skilled nursing care, physical therapy, and occupational therapy.              [1]   Current Facility-Administered Medications   Medication Dose Route Frequency Provider Last Rate Last Admin    0.9%  NaCl infusion (for blood administration)   Intravenous Q24H PRN Michael Salazar MD        acetaminophen tablet 650 mg  650 mg Oral Q4H PRN Harsha Lora MD   650 mg at 05/30/25 0535    albuterol-ipratropium 2.5 mg-0.5 mg/3 mL nebulizer solution 3 mL  3 mL Nebulization Q6H PRN Abner Moncada MD        allopurinoL tablet 300 mg  300 mg Oral Daily Harsha Lora MD   300 mg at 05/31/25 0943    aluminum-magnesium hydroxide-simethicone 200-200-20 mg/5 mL suspension 30 mL  30 mL Oral QID PRN Harsha Lora MD        aspirin EC tablet 81 mg  81 mg Oral Daily Rody Klein MD   81 mg at 05/31/25 0944    atorvastatin tablet 80 mg  80 mg Oral Daily Harsha Lora MD   80 mg at 05/31/25 0943    doxycycline tablet 100 mg  100 mg Oral Q12H Abner Moncada MD   100 mg at 05/31/25 0944    fluticasone furoate-vilanteroL 100-25 mcg/dose diskus inhaler 1 puff  1 puff Inhalation Daily Harsha Lora MD   1 puff at 05/31/25 0838    gabapentin capsule 300 mg  300 mg Oral TID Rody Klein MD   300 mg at 05/31/25 1553    glucagon (human recombinant)  injection 1 mg  1 mg Intramuscular PRN Harsha Lora MD        glucagon (human recombinant) injection 1 mg  1 mg Intramuscular PRN Bella Kearney MD        glucose chewable tablet 16 g  16 g Oral PRN Harsha Lora MD        glucose chewable tablet 24 g  24 g Oral PRN Harsha Lora MD        melatonin tablet 6 mg  6 mg Oral Nightly PRN Harsha Lora MD   6 mg at 05/30/25 2039    methocarbamoL tablet 500 mg  500 mg Oral Q6H PRN Rody Klein MD   500 mg at 05/31/25 1132    mirtazapine tablet 7.5 mg  7.5 mg Oral QHS Harsha Lora MD   7.5 mg at 05/30/25 2039    naloxone 0.4 mg/mL injection 0.02 mg  0.02 mg Intravenous PRN Harsha Lora MD        ondansetron disintegrating tablet 8 mg  8 mg Oral Q8H PRN Harsha Lora MD   8 mg at 05/28/25 2327    polyethylene glycol packet 17 g  17 g Oral Daily PRN Harsha Lora MD        QUEtiapine tablet 50 mg  50 mg Oral QHS Abner Moncada MD   50 mg at 05/30/25 2039    simethicone chewable tablet 80 mg  1 tablet Oral QID PRN Harsha Lora MD   80 mg at 05/28/25 2327    sodium chloride 0.9% flush 1-10 mL  1-10 mL Intravenous Q12H PRN Harsha Lora MD         Facility-Administered Medications Ordered in Other Encounters   Medication Dose Route Frequency Provider Last Rate Last Admin    mupirocin 2 % ointment   Nasal On Call Procedure Kang Diaz MD   Given at 10/25/23 1045    tranexamic acid (CYKLOKAPRON) 1,000 mg in sodium chloride 0.9 % 100 mL IVPB (MB+)  1,000 mg Intravenous Once Kang Diaz MD        tranexamic acid (CYKLOKAPRON) 1,000 mg in sodium chloride 0.9 % 100 mL IVPB (MB+)  1,000 mg Intravenous Once Kang Diaz MD

## 2025-05-31 NOTE — PT/OT/SLP EVAL
Occupational Therapy   Partial Co- Evaluation and Treatment  PT present for co- eval due to pt's multiple medical comorbidities and functional/cognition deficits requiring two skilled therapists to appropriately progress pt's musculoskeletal strength, neuromuscular control, and pain/ activity tolerance while taking into consideration medical acuity and pt safety.    Name: Jessica Floyd  MRN: [id number]  Admitting Diagnosis: Acute GI bleeding  Recent Surgery: Procedure(s) (LRB):  COLONOSCOPY, WITH RETROGRADE BALLOON ENTEROSCOPY, SINGLE OR DOUBLE BALLOON (N/A) 2 Days Post-Op    Recommendations:     Discharge Recommendations: Low Intensity Therapy  Discharge Equipment Recommendations:  none  Barriers to discharge:  None    Assessment:     Jessica Floyd is a 74 y.o. female with a medical diagnosis of Acute GI bleeding.  She presents with hypotensive upon arrival however when returned later she tolerated fxl mob into the hallway. Performance deficits affecting function: weakness, impaired endurance, impaired self care skills, impaired functional mobility, impaired balance, decreased safety awareness, impaired cardiopulmonary response to activity, decreased lower extremity function.      Rehab Prognosis: Good; patient would benefit from acute skilled OT services to address these deficits and reach maximum level of function.       Plan:     Patient to be seen 4 x/week to address the above listed problems via self-care/home management, therapeutic activities, therapeutic exercises, neuromuscular re-education  Plan of Care Expires:    Plan of Care Reviewed with: patient, daughter    Subjective     Chief Complaint: Dizziness, BLE pain L>R  Patient/Family Comments/goals: We want to see how far she can walk so she can go home today     Occupational Profile:  Living Environment: Lives with spouse in a 2SH with elevator access. Full bed and bath on 2nd floor.  Previous level of function: Mod Ind for ADLs  and fxl mob  Equipment Used  at Home: walker, rolling, rollator, wheelchair, cane, straight, oxygen  Assistance upon Discharge: family    Pain/Comfort:  Pain Rating 1: 8/10  Location - Side 1: Bilateral  Location - Orientation 1: generalized  Location 1: leg (L>R)  Pain Addressed 1: Reposition, Distraction, Nurse notified  Pain Rating Post-Intervention 1:  (unrated)Pain Rating 1: 8/10  Location - Side 1: Bilateral  Location - Orientation 1: generalized  Location 1: leg    Patients cultural, spiritual, Roman Catholic conflicts given the current situation: no     Objective:     Communicated with: Nurse prior to session.  Patient found up in chair with telemetry, oxygen, PureWick, peripheral IV upon OT entry to room.    General Precautions: Standard, fall  Orthopedic Precautions: N/A  Braces: N/A  Respiratory Status: Nasal cannula, flow 2 L/min    Occupational Performance:    Bed Mobility with PT present:    Patient completed Supine to Sit with minimum assistance  Patient completed Sit to Supine with stand by assistance    Bed Mobility: (2nd visit)  Patient completed Supine to Sit with CGA    Vital 1st visit   89/54 (MAP 56)  Seated at EOB   100/56 (MAP 71) HOB elevated     Vital 2nd visit   100/55 (MAP 73)  at bed level HOB elevated  97/52 (MAP 69) at seated at EOB    Functional Mobility/Transfers:  Patient completed Sit <> Stand Transfer with contact guard assistance  with  rolling walker   Functional Mobility: Pt demonstrated functional mobility training to simulate household distance from EOB into  hallway ~ 50-70 ft (with chair in tow, pushed back to room) with RW with contact guard assistance and no LOB and fair  visual search and navigation strategies.     Activities of Daily Living:  Upper Body Dressing: minimum assistance don gown posteriorly   Lower Body Dressing: maximal assistance to don socks at bed level  Urinary Experience incontinence      Cognitive/Visual Perceptual:  Cognitive/Psychosocial Skills:     -       Oriented to: Person,  Place, Time, and Situation   -       Follows Commands/attention:Follows multistep  commands  -       Communication: clear/fluent  -       Memory: No Deficits noted  -       Safety awareness/insight to disability: impaired   -       Mood/Affect/Coping skills/emotional control: Appropriate to situation    Physical Exam:  Upper Extremity Range of Motion:     -       Right Upper Extremity: WFL  -       Left Upper Extremity: WFL  Upper Extremity Strength:    -       Right Upper Extremity: WFL  -       Left Upper Extremity: WFL   Strength:    -       Right Upper Extremity: WFL  -       Left Upper Extremity: WFL    AMPAC 6 Click ADL:  AMPAC Total Score: 18    Treatment & Education:  Pt.educated on dressing techniques, shoe etiquette, and energy conservation upon d/c   Pt.educated on benefits and use of DME for safety and fall prevention prior to d/c.     Pt educated on role of occupational therapy, POC, and safety during ADLs and functional mobility. Pt and OT discussed importance of safe, continued mobility to optimize daily living skills. Pt verbalized understanding. Pt given instruction to call for medical staff/nurse for assistance.     Patient left up in chair with all lines intact and call button in reach, family present and nurse notified     GOALS:   Multidisciplinary Problems       Occupational Therapy Goals          Problem: Occupational Therapy    Goal Priority Disciplines Outcome Interventions   Occupational Therapy Goal     OT, PT/OT Progressing    Description: Goals to be met by: 6/13/2025     Patient will increase functional independence with ADLs by performing:    LE Dressing with Minimal Assistance.  Grooming while standing at sink with Stand-by Assistance.  Toileting from toilet with Supervision for hygiene and clothing management.   Step transfer with Stand-by Assistance  Toilet transfer to toilet with Stand-by Assistance.                         DME Justifications:  No DME recommended requiring DME  justifications    History:     Past Medical History:   Diagnosis Date    Allergic rhinitis     Amblyopia     Carotid stenosis     Coronary atherosclerosis     Outside [facility] 6/2014: 20% mLAD, 50% mCx, 20%, mRCA    COVID-19 11/15/2023    Dyslipidemia     ESBL (extended spectrum beta-lactamase) producing bacteria infection     UTI    Hypertension     Influenza A 12/28/2023    Nausea and vomiting 05/26/2017    Pulmonary emphysema 10/28/2023    SAH (subarachnoid hemorrhage) 08/24/2016 1999, s/p clipping    Subarachnoid hemorrhage due to ruptured aneurysm 1999         Past Surgical History:   Procedure Laterality Date    ADENOIDECTOMY      ANTERIOR CRUCIATE LIGAMENT REPAIR  2012    APPENDECTOMY      CATARACT EXTRACTION W/  INTRAOCULAR LENS IMPLANT Right 11/07/2022    Procedure: EXTRACTION, CATARACT, WITH IOL INSERTION;  Surgeon: Higinio Cristina MD;  Location: [facility];  Service: Ophthalmology;  Laterality: Right;    CATARACT EXTRACTION W/  INTRAOCULAR LENS IMPLANT Left 11/21/2022    Procedure: EXTRACTION, CATARACT, WITH IOL INSERTION;  Surgeon: Higinio Cristina MD;  Location: [facility];  Service: Ophthalmology;  Laterality: Left;    CEREBRAL ANEURYSM REPAIR  1999    clip    COLONOSCOPY N/A 5/16/2024    Procedure: COLONOSCOPY;  Surgeon: Rich Syed MD;  Location: [facility] ([address] [place] [address]);  Service: Endoscopy;  Laterality: N/A;    COLONOSCOPY, WITH RETROGRADE BALLOON ENTEROSCOPY, SINGLE OR DOUBLE BALLOON N/A 5/29/2025    Procedure: COLONOSCOPY, WITH RETROGRADE BALLOON ENTEROSCOPY, SINGLE OR DOUBLE BALLOON;  Surgeon: Rich Syed MD;  [facility]  Service: Endoscopy;  Laterality: N/A;    DENTAL SURGERY      DIAGNOSTIC LAPAROSCOPY N/A 5/17/2024    Procedure: LAPAROSCOPY, DIAGNOSTIC;  Surgeon: Ruben Oscar MD;  [facility];  Service: General;  Laterality: N/A;    ESOPHAGOGASTRODUODENOSCOPY N/A 5/15/2024    Procedure: EGD (ESOPHAGOGASTRODUODENOSCOPY);  Surgeon: Rich Syed MD;   Location: [facility] ENDO (2ND FLR);  Service: Endoscopy;  Laterality: N/A;    ESOPHAGOGASTRODUODENOSCOPY N/A 5/28/2025    Procedure: EGD (ESOPHAGOGASTRODUODENOSCOPY);  Surgeon: Rich Syed MD;  Location: [facility] ENDO (2ND FLR);  Service: Endoscopy;  Laterality: N/A;    EXCISION, SMALL INTESTINE N/A 5/17/2024    Procedure: EXCISION, SMALL INTESTINE;  Surgeon: Ruben Oscar MD;  [facility];  Service: General;  Laterality: N/A;    EYE SURGERY Bilateral     cataract removal and lens implants    HIP ARTHROPLASTY Left 05/28/2023    Procedure: TOTAL HIP ARTHROPLASTY;  Surgeon: Juan Wan MD;  [facility];  Service: Orthopedics;  Laterality: Left;    LAPAROTOMY, EXPLORATORY N/A 5/17/2024    Procedure: LAPAROTOMY, EXPLORATORY;  Surgeon: Ruben Oscar MD;  [facility];  Service: General;  Laterality: N/A;    REPAIR, HERNIA, INGUINAL Bilateral 5/17/2024    Procedure: REPAIR, HERNIA, INGUINAL;  Surgeon: Ruben Oscar MD;  [facility];  Service: General;  Laterality: Bilateral;    TONSILLECTOMY         Time Tracking:     OT Date of Treatment: 05/31/25  OT Start Time: 0931 2nd visit   1407  OT Stop Time: 0956 2nd visit 1440  OT Total Time (min): 25 min +33 min = 58 mins    Billable Minutes:Evaluation 10  Self Care/Home Management 10  Therapeutic Activity 33    5/31/2025

## 2025-05-31 NOTE — PLAN OF CARE
Problem: Occupational Therapy  Goal: Occupational Therapy Goal  Description: Goals to be met by: 6/13/2025     Patient will increase functional independence with ADLs by performing:    LE Dressing with Minimal Assistance.  Grooming while standing at sink with Stand-by Assistance.  Toileting from toilet with Supervision for hygiene and clothing management.   Step transfer with Stand-by Assistance  Toilet transfer to toilet with Stand-by Assistance.    Outcome: Progressing

## 2025-05-31 NOTE — PT/OT/SLP EVAL
Physical Therapy Co-Evaluation    Patient Name:  Jessica Floyd   MRN:  [id number]    Recommendations:     Discharge Recommendations: Low Intensity Therapy   Discharge Equipment Recommendations: none   Barriers to discharge: orthostatics, unable to assess OOB mobility    Co-eval performed due to anticipated need for 2 skilled therapists to appropriately and safely assess patient's strength and endurance to facilitate functional and safe mobility in addition to accommodating for patient's activity tolerance.    Assessment:     Jessica Floyd is a 74 y.o. female admitted with a medical diagnosis of Acute GI bleeding.  She presents with the following impairments/functional limitations: weakness, impaired self care skills, impaired endurance, impaired functional mobility, pain, decreased lower extremity function, impaired cardiopulmonary response to activity.    Evaluation limited 2/2 symptomatic orthostatics upon EoB sitting, RN notified. BP 89/53 (MAP: 56) with c/o dizziness while seated EoB. Once returned to bed with HoB elevated and ankle pumps, /56 (MAP: 71). Per pt, baseline systolic in 130s. Required little assistance to perform bed mobility. Unable to further assess OOB mobility, but will continue to progress once medically stable.   Pt likely to require low intensity therapy at this time.    Rehab Prognosis: Good; patient would benefit from acute skilled PT services to address these deficits and reach maximum level of function.    Recent Surgery: Procedure(s) (LRB):  COLONOSCOPY, WITH RETROGRADE BALLOON ENTEROSCOPY, SINGLE OR DOUBLE BALLOON (N/A) 2 Days Post-Op    Plan:     During this hospitalization, patient to be seen 4 x/week to address the identified rehab impairments via gait training, therapeutic activities, therapeutic exercises, neuromuscular re-education and progress toward the following goals:    Plan of Care Expires:  06/28/25    Subjective     Chief Complaint: BLE pain, L>R  Patient/Family  Comments/goals: To discharge home once stable   Pain/Comfort:  Pain Rating 1: 8/10  Location - Side 1: Bilateral  Location - Orientation 1: generalized  Location 1: leg (L>R)  Pain Addressed 1: Reposition, Distraction, Nurse notified  Pain Rating Post-Intervention 1:  (unrated)    Patients cultural, spiritual, Rastafari conflicts given the current situation: no    Living Environment:  Pt lives with  in 2SH with 3 EMILY & BHRs to enter. Elevator access to second floor with pt's bed/bath. Uses home O2 PRN, usually for recovery following increased activity.   Prior to admission, patients level of function was Mod(I) using rollator.  Equipment used at home: walker, rolling, rollator, wheelchair, cane, straight, oxygen.  DME owned (not currently used): single point cane.  Upon discharge, patient will have assistance from  and daughter.    Objective:     Communicated with RN prior to session.  Patient found HOB elevated with telemetry, oxygen, PureWick, peripheral IV  upon PT entry to room.    General Precautions: Standard, fall  Orthopedic Precautions:N/A   Braces: N/A  Respiratory Status: Nasal cannula, flow 2 L/min    Exams:  Cognitive Exam:  Patient is oriented to Person, Place, Time, and Situation  RLE ROM: WFL  RLE Strength: Unable to test 2/2 OH with EoB sitting. AROM WFL in supine  LLE ROM: WFL  LLE Strength: Unable to test 2/2 OH with EoB sitting. AROM WFL in supine    Functional Mobility:  Bed Mobility:     Supine to Sit: minimum assistance  C/o dizziness once seated EoB, BP 89/54 (MAP: 56)  Sit to Supine: stand by assistance  BP with HoB elevated: 100/56 (MAP: 71)  Transfers:  NT 2/2 symptomatic OH  Balance:   Static Sitting: Good; Supervision  Maintained ~3-4 minutes prior to requiring return to supine d/t unresolving dizziness despite performing ankle pumps    AM-PAC 6 CLICK MOBILITY  Total Score:19       Treatment & Education:  Educating on keeping HoB elevated and performing ankle pumps to  improve orthostatics  Patient educated on role of therapy, goals of session, and benefits of mobilizing.   Discussed PT plan of care during hospitalization.   Patient educated on calling for assistance.   Patient educated on how their diagnosis impacts their mobility within PT scope of practice.   All questions answered within PT scope of practice.     Patient left HOB elevated with all lines intact, call button in reach, RN notified, and daughter present.    GOALS:   Multidisciplinary Problems       Physical Therapy Goals          Problem: Physical Therapy    Goal Priority Disciplines Outcome Interventions   Physical Therapy Goal     PT, PT/OT Progressing    Description: Goals to be met by: 2025     Patient will increase functional independence with mobility by performin. Supine to sit with Modified Hughes  2. Sit to supine with Modified Hughes  3. Sit to stand transfer with Stand-by Assistance  4. Gait  x 50 feet with Stand-by Assistance using LRAD.   5. Ascend/descend 3 stair with bilateral Handrails Contact Guard Assistance using LRAD.                          DME Justifications:  No DME recommended requiring DME justifications    History:     Past Medical History:   Diagnosis Date    Allergic rhinitis     Amblyopia     Carotid stenosis     Coronary atherosclerosis     Outside [facility] 2014: 20% mLAD, 50% mCx, 20%, mRCA    COVID-19 11/15/2023    Dyslipidemia     ESBL (extended spectrum beta-lactamase) producing bacteria infection     UTI    Hypertension     Influenza A 2023    Nausea and vomiting 2017    Pulmonary emphysema 10/28/2023    SAH (subarachnoid hemorrhage) 2016, s/p clipping    Subarachnoid hemorrhage due to ruptured aneurysm        Past Surgical History:   Procedure Laterality Date    ADENOIDECTOMY      ANTERIOR CRUCIATE LIGAMENT REPAIR  2012    APPENDECTOMY      CATARACT EXTRACTION W/  INTRAOCULAR LENS IMPLANT Right 2022    Procedure:  EXTRACTION, CATARACT, WITH IOL INSERTION;  Surgeon: Higinio Cristina MD;  Location: [facility] OR;  Service: Ophthalmology;  Laterality: Right;    CATARACT EXTRACTION W/  INTRAOCULAR LENS IMPLANT Left 11/21/2022    Procedure: EXTRACTION, CATARACT, WITH IOL INSERTION;  Surgeon: Higinio Cristina MD;  Location: [facility] OR;  Service: Ophthalmology;  Laterality: Left;    CEREBRAL ANEURYSM REPAIR  1999    clip    COLONOSCOPY N/A 5/16/2024    Procedure: COLONOSCOPY;  Surgeon: Rich Syed MD;  Location: [facility] ENDO (2ND FLR);  Service: Endoscopy;  Laterality: N/A;    COLONOSCOPY, WITH RETROGRADE BALLOON ENTEROSCOPY, SINGLE OR DOUBLE BALLOON N/A 5/29/2025    Procedure: COLONOSCOPY, WITH RETROGRADE BALLOON ENTEROSCOPY, SINGLE OR DOUBLE BALLOON;  Surgeon: Rich Syed MD;  Location: [facility] ENDO (2ND FLR);  Service: Endoscopy;  Laterality: N/A;    DENTAL SURGERY      DIAGNOSTIC LAPAROSCOPY N/A 5/17/2024    Procedure: LAPAROSCOPY, DIAGNOSTIC;  Surgeon: Ruben Oscar MD;  Location: [facility] OR 2ND FLR;  Service: General;  Laterality: N/A;    ESOPHAGOGASTRODUODENOSCOPY N/A 5/15/2024    Procedure: EGD (ESOPHAGOGASTRODUODENOSCOPY);  Surgeon: Rich Syed MD;  Location: [facility] ENDO (2ND FLR);  Service: Endoscopy;  Laterality: N/A;    ESOPHAGOGASTRODUODENOSCOPY N/A 5/28/2025    Procedure: EGD (ESOPHAGOGASTRODUODENOSCOPY);  Surgeon: Rich Syed MD;  Location: [facility] ENDO (2ND FLR);  Service: Endoscopy;  Laterality: N/A;    EXCISION, SMALL INTESTINE N/A 5/17/2024    Procedure: EXCISION, SMALL INTESTINE;  Surgeon: Ruben Oscar MD;  Location: [facility] OR 2ND FLR;  Service: General;  Laterality: N/A;    EYE SURGERY Bilateral     cataract removal and lens implants    HIP ARTHROPLASTY Left 05/28/2023    Procedure: TOTAL HIP ARTHROPLASTY;  Surgeon: Juan Wan MD;  Location: [facility] OR 2ND FLR;  Service: Orthopedics;  Laterality: Left;    LAPAROTOMY, EXPLORATORY N/A 5/17/2024    Procedure: LAPAROTOMY, EXPLORATORY;   Surgeon: Ruben Oscar MD;  Location: [facility] OR [address] [place] [address];  Service: General;  Laterality: N/A;    REPAIR, HERNIA, INGUINAL Bilateral 5/17/2024    Procedure: REPAIR, HERNIA, INGUINAL;  Surgeon: Ruben Oscar MD;  Location: [facility] OR [address] [place] [address];  Service: General;  Laterality: Bilateral;    TONSILLECTOMY         Time Tracking:     PT Received On: 05/31/25  PT Start Time: 0931     PT Stop Time: 0956  PT Total Time (min): 25 min     Billable Minutes: Evaluation 15 and Therapeutic Activity 10      05/31/2025

## 2025-05-31 NOTE — PLAN OF CARE
Eval completed; POC established    Problem: Physical Therapy  Goal: Physical Therapy Goal  Description: Goals to be met by: 2025     Patient will increase functional independence with mobility by performin. Supine to sit with Modified Edwards  2. Sit to supine with Modified Edwards  3. Sit to stand transfer with Stand-by Assistance  4. Gait  x 50 feet with Stand-by Assistance using LRAD.   5. Ascend/descend 3 stair with bilateral Handrails Contact Guard Assistance using LRAD.     Outcome: Progressing

## 2025-05-31 NOTE — PROGRESS NOTES
"[facility] Card[facility] Medicine  Progress Note    Patient Name: Jessica Floyd  MRN: [id number]  Patient Class: IP- Inpatient   Admission Date: 5/27/2025  Length of Stay: 3 days  Attending Physician: Rody Klein MD  Primary Care Provider: Jace Valencia MD        Subjective     Principal Problem:Acute GI bleeding        HPI:  Jessica Floyd is a 74 y.o. female with COPD, HFpEF, CAD, ERIK, history of GIB with small bowel resection/ IR embolization last year presenting with weakness and dark stool.    Patient and  at bedside provide history.     They report that this morning, she had a large-volume episode of fecal incontinence, which was "dark like hot fudge" and very watery. She has intermittent dark stools at baseline, however never this voluminous or severe. She has chronic pre-syncopal episodes and experienced another today while on the toilet.  Throughout the day, she has had significant weakness and fatigue, to the point where she had to be carried to the car.  At baseline, she ambulates with a walker, and sometimes gets around with a wheelchair.  They presented to primary care clinic, and she was noted to be hypotensive, improved with IV fluids.  She was therefore sent to the ED for further evaluation.    She uses oxygen p.r.n. for shortness for breath, but mainly at night.  Denies any NSAID use.   Drinks 1-2 glasses of wine per day.     She denies any abdominal pain, nausea, vomiting, or fever.     Overview/Hospital Course:   74 y.o. female with history of COPD, HFpEF, CAD, ERIK, hx bleeding from the cecum s/p IR embolization May 2024, incarcerated inguinal hernia s/p SB resection (17 cm) May 2024 who was admitted with dark melanotic stool/presyncope.  Hemoglobin 4.9 on presentation status post 2 unit PRBC transfusion hemoglobin improved to 7.8.  CTA negative for GI bleed.  GI was consulted status post EGD on 05/28/2025 with no evidence of active bleed plan for C scope in a.m. required " a dose of Ativan for anxiety resume home Remeron/Seroquel for anxiety..  Noted to have left ankle wound initiate on doxy.  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 There is distal reconstitution   Pending lower extremity arterial and venous ultrasound. Given patient is symptomatic with LLE pain will need IC consult for revascularization? once acute GI bleed issue is resolved    Interval History:     Hgb stable. WBC mildly elevated. No further BM. Vascular surgery reconsulted.     Review of Systems  Objective:     Vital Signs (Most Recent):  Temp: 98.5 °F (36.9 °C) (05/30/25 1617)  Pulse: 82 (05/30/25 2254)  Resp: 18 (05/30/25 2034)  BP: (!) 118/56 (05/30/25 2034)  SpO2: (!) 91 % (05/30/25 2034) Vital Signs (24h Range):  Temp:  [97.9 °F (36.6 °C)-98.5 °F (36.9 °C)] 98.5 °F (36.9 °C)  Pulse:  [77-93] 82  Resp:  [16-18] 18  SpO2:  [91 %-96 %] 91 %  BP: ()/(53-68) 118/56     Weight: 49.9 kg (110 lb)  Body mass index is 19.49 kg/m².    Intake/Output Summary (Last 24 hours) at 5/30/2025 2316  Last data filed at 5/30/2025 1514  Gross per 24 hour   Intake 560 ml   Output 300 ml   Net 260 ml         Physical Exam  Cardiovascular:      Rate and Rhythm: Normal rate.      Pulses: Normal pulses.   Pulmonary:      Effort: No respiratory distress.      Breath sounds: Normal breath sounds. No wheezing.   Abdominal:      General: Bowel sounds are normal. There is no distension.      Palpations: Abdomen is soft.   Musculoskeletal:      Right lower leg: No edema.      Left lower leg: No edema.   Skin:     Findings: Lesion (L ankle wound see media for pciture) present.   Neurological:      Mental Status: She is alert and oriented to person, place, and time. Mental status is at baseline.               Significant Labs: All pertinent labs within the past 24 hours have been reviewed.    Significant Imaging: I have reviewed all pertinent imaging  results/findings within the past 24 hours.        Assessment & Plan  Acute GI bleeding  admitted with dark melanotic stool/presyncope.  Hemoglobin 4.9 on presentation status post 2 unit PRBC transfusion hemoglobin improved to 7.8.  CTA negative for GI bleed.  GI was consulted status post EGD on 05/28/2025 with no evidence of active bleed plan for C scope in a.m.   Iron deficiency anemia secondary to blood loss (chronic)  Anemia is likely due to Iron deficiency. Most recent hemoglobin and hematocrit are listed below.  Recent Labs     05/28/25  2316 05/29/25  0221 05/30/25  0245   HGB 8.2* 7.4* 7.7*   HCT 25.4* 23.2* 25.5*     Plan  - Monitor serial CBC: as above  - Transfuse PRBC if patient becomes hemodynamically unstable, symptomatic or H/H drops below 7/21.  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 There is distal reconstitution   Pending lower extremity arterial and venous ultrasound. Given patient is symptomatic with LLE pain vasclar surgery consulted.       Coronary artery disease involving native coronary artery of native heart without angina pectoris  Patient with known CAD, which is controlled Will continue Statin, holding aspirin given GI bleed, and monitor for S/Sx of angina/ACS. Continue to monitor on telemetry.   Gastroesophageal reflux disease without esophagitis  Continue PPI as above.    Hypokalemia  Patient's most recent potassium results are listed below.   Recent Labs     05/28/25  0345 05/29/25  0806 05/30/25  0913   K 4.4 2.9* 3.3*     Plan  - Replete potassium per protocol  - Monitor potassium Daily  - Replete Mg as well   Hypertension  Hold home antihypertensives in the setting of initial hypotension with GI bleed.  Pulmonary emphysema  History of COPD, not on home oxygen. Currently without evidence of acute exacerbation, and sats on room air at goal 88% or  greater. Will continue hospital formulary equivalent of home regimen and monitor respiratory status.   (HFpEF) heart failure with preserved ejection fraction  Patient with known diastolic CHF. Currently without evidence of volume overload on exam, and not in acute exacerbation. BNP lower than prior. Will hold home antihypertensives given GIB and initial hypotension.  Appropriate diet ordered. Monitor volume status.       Anxiety  required a dose of Ativan for anxiety resume home Remeron/Seroquel for anxiety..   Hypomagnesemia  Patient has Abnormal Magnesium: hypomagnesemia. Will continue to monitor electrolytes closely. Will replace the affected electrolytes and repeat labs to be done after interventions completed. The patient's magnesium results have been reviewed and are listed below.  Recent Labs   Lab 05/30/25  0913   MG 2.2      Body mass index (BMI) 19.9 or less, adult      Acute cystitis without hematuria      Aortoiliac occlusive disease  PAD (peripheral artery disease)          Palliative care encounter      VTE Risk Mitigation (From admission, onward)           Ordered     IP VTE HIGH RISK PATIENT  Once         05/28/25 0054     Place sequential compression device  Until discontinued         05/28/25 0054     Reason for No Pharmacological VTE Prophylaxis  Once        Question:  Reasons:  Answer:  Active Bleeding    05/28/25 0054     Place sequential compression device  Until discontinued         05/27/25 2231                    Discharge Planning   DEBBIE: 5/31/2025     Code Status: Full Code   Medical Readiness for Discharge Date:   Discharge Plan A: Home Health, Home with family   Discharge Delays: None known at this time            Please place Justification for DME        Rody Klein MD  Department of Hospital Medicine   [facility] - [facility] Step[facility]

## 2025-05-31 NOTE — SUBJECTIVE & OBJECTIVE
Interval History:     Hgb stable. WBC mildly elevated. No further BM. Vascular surgery reconsulted.     Review of Systems  Objective:     Vital Signs (Most Recent):  Temp: 98.5 °F (36.9 °C) (05/30/25 1617)  Pulse: 82 (05/30/25 2254)  Resp: 18 (05/30/25 2034)  BP: (!) 118/56 (05/30/25 2034)  SpO2: (!) 91 % (05/30/25 2034) Vital Signs (24h Range):  Temp:  [97.9 °F (36.6 °C)-98.5 °F (36.9 °C)] 98.5 °F (36.9 °C)  Pulse:  [77-93] 82  Resp:  [16-18] 18  SpO2:  [91 %-96 %] 91 %  BP: ()/(53-68) 118/56     Weight: 49.9 kg (110 lb)  Body mass index is 19.49 kg/m².    Intake/Output Summary (Last 24 hours) at 5/30/2025 2316  Last data filed at 5/30/2025 1514  Gross per 24 hour   Intake 560 ml   Output 300 ml   Net 260 ml         Physical Exam  Cardiovascular:      Rate and Rhythm: Normal rate.      Pulses: Normal pulses.   Pulmonary:      Effort: No respiratory distress.      Breath sounds: Normal breath sounds. No wheezing.   Abdominal:      General: Bowel sounds are normal. There is no distension.      Palpations: Abdomen is soft.   Musculoskeletal:      Right lower leg: No edema.      Left lower leg: No edema.   Skin:     Findings: Lesion (L ankle wound see media for pciture) present.   Neurological:      Mental Status: She is alert and oriented to person, place, and time. Mental status is at baseline.               Significant Labs: All pertinent labs within the past 24 hours have been reviewed.    Significant Imaging: I have reviewed all pertinent imaging results/findings within the past 24 hours.

## 2025-05-31 NOTE — ASSESSMENT & PLAN NOTE
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 There is distal reconstitution   Pending lower extremity arterial and venous ultrasound. Given patient is symptomatic with LLE pain vasclar surgery consulted.

## 2025-05-31 NOTE — ASSESSMENT & PLAN NOTE
Anemia is likely due to Iron deficiency. Most recent hemoglobin and hematocrit are listed below.  Recent Labs     05/28/25  2316 05/29/25  0221 05/30/25  0245   HGB 8.2* 7.4* 7.7*   HCT 25.4* 23.2* 25.5*     Plan  - Monitor serial CBC: as above  - Transfuse PRBC if patient becomes hemodynamically unstable, symptomatic or H/H drops below 7/21.  - s/p 2U pRBC transfusion on 05/27

## 2025-05-31 NOTE — NURSING
Patient is ready for discharge. Patient stable alert and oriented. IVs removed. No complaints of pain. Discussed discharge plan. Reviewed medications and side effects, appointments, and answered questions with patient and family. RX picked up by family.

## 2025-05-31 NOTE — PLAN OF CARE
Plan of care discussed with patient. Patient AOX4 and VSS. Pt maintained free from falls/trauma/injury and skin breakdown. Pt complained of pain, requested something stronger than the tylenol she had been getting. MD notified, stronger dose of tylenol given. All questions and concerns addressed.        Problem: Adult Inpatient Plan of Care  Goal: Plan of Care Review  Outcome: Progressing  Goal: Patient-Specific Goal (Individualized)  Outcome: Progressing  Goal: Absence of Hospital-Acquired Illness or Injury  Outcome: Progressing  Goal: Optimal Comfort and Wellbeing  Outcome: Progressing  Goal: Readiness for Transition of Care  Outcome: Progressing     Problem: Gastrointestinal Bleeding  Goal: Optimal Coping with Acute Illness  Outcome: Progressing  Goal: Hemostasis  Outcome: Progressing     Problem: Wound  Goal: Optimal Coping  Outcome: Progressing  Goal: Optimal Functional Ability  Outcome: Progressing  Goal: Absence of Infection Signs and Symptoms  Outcome: Progressing  Goal: Improved Oral Intake  Outcome: Progressing  Goal: Optimal Pain Control and Function  Outcome: Progressing  Goal: Skin Health and Integrity  Outcome: Progressing  Goal: Optimal Wound Healing  Outcome: Progressing     Problem: Fall Injury Risk  Goal: Absence of Fall and Fall-Related Injury  Outcome: Progressing     Problem: Coping Ineffective  Goal: Effective Coping  Outcome: Progressing     Problem: Skin Injury Risk Increased  Goal: Skin Health and Integrity  Outcome: Progressing

## 2025-05-31 NOTE — PLAN OF CARE
Plan of care discussed with patient.  Patient ambulating with walker x1, fall precautions in place. Patient has no complaints of pain. Discussed medications and care. Patient has no questions at this time. Will continue to monitor.

## 2025-06-01 NOTE — PLAN OF CARE
05/31/25 1908   Post-Acute Status   Post-Acute Authorization Home Health;Other  (In-Home Wound Care)   Home Health Status Referrals Sent   Hospital Resources/Appts/Education Provided Provided patient/caregiver with written discharge plan information;Provided education on problems/symptoms using teachback;Appointments scheduled and added to [facility]   Discharge Plan   Discharge Plan A Home Health   Discharge Plan B Home with family     Home Health orders sent via [facility] referral system. Patient referral to [facility].     Discharge Plan A and Plan B have been determined by review of patient's clinical status, future medical and therapeutic needs, and coverage/benefits for post-acute care in coordination with multidisciplinary team members.     Sherie Ulloa LMSW    Quality 226: Preventive Care And Screening: Tobacco Use: Screening And Cessation Intervention: Patient screened for tobacco use and is an ex/non-smoker Detail Level: Detailed

## 2025-06-01 NOTE — PLAN OF CARE
[facility] Step[facility]  Discharge Final Note    Primary Care Provider: Jace Valencia MD    Expected Discharge Date: 5/31/2025    Final Discharge Note (most recent)       Final Note - 05/31/25 1912          Final Note    Assessment Type Final Discharge Note (P)      Anticipated Discharge Disposition Home-Health Care Svc (P)      What phone number can be called within the next 1-3 days to see how you are doing after discharge? [phone] (P)         Post-Acute Status    Post-Acute Authorization Home Health;Other (P)    [facility]    Home Health Status Referrals Sent (P)      Discharge Delays None known at this time (P)                      Important Message from Medicare             Pt. discharged home with home health & referral to [facility]. Awaiting an accepting home health company.     Sherie Ulloa LMSW

## 2025-06-02 ENCOUNTER — PATIENT MESSAGE (OUTPATIENT)
Dept: INTERNAL MEDICINE | Facility: CLINIC | Age: 75
End: 2025-06-02
Payer: MEDICARE

## 2025-06-02 LAB
BACTERIA BLD CULT: NORMAL
BACTERIA BLD CULT: NORMAL

## 2025-06-03 ENCOUNTER — PATIENT MESSAGE (OUTPATIENT)
Dept: INTERNAL MEDICINE | Facility: CLINIC | Age: 75
End: 2025-06-03

## 2025-06-03 ENCOUNTER — OFFICE VISIT (OUTPATIENT)
Dept: INTERNAL MEDICINE | Facility: CLINIC | Age: 75
End: 2025-06-03
Payer: MEDICARE

## 2025-06-03 ENCOUNTER — PATIENT OUTREACH (OUTPATIENT)
Dept: ADMINISTRATIVE | Facility: CLINIC | Age: 75
End: 2025-06-03
Payer: MEDICARE

## 2025-06-03 DIAGNOSIS — K31.819 ANGIECTASIA OF GASTROINTESTINAL TRACT: Primary | ICD-10-CM

## 2025-06-03 DIAGNOSIS — S81.802A WOUND OF LEFT LOWER EXTREMITY, INITIAL ENCOUNTER: ICD-10-CM

## 2025-06-03 DIAGNOSIS — Z09 HOSPITAL DISCHARGE FOLLOW-UP: ICD-10-CM

## 2025-06-03 DIAGNOSIS — I95.9 HYPOTENSION, UNSPECIFIED HYPOTENSION TYPE: ICD-10-CM

## 2025-06-03 DIAGNOSIS — I74.5 ILIAC ARTERY OCCLUSION, LEFT: ICD-10-CM

## 2025-06-03 PROCEDURE — 98006 SYNCH AUDIO-VIDEO EST MOD 30: CPT | Mod: 95,,, | Performed by: STUDENT IN AN ORGANIZED HEALTH CARE EDUCATION/TRAINING PROGRAM

## 2025-06-03 PROCEDURE — 1160F RVW MEDS BY RX/DR IN RCRD: CPT | Mod: CPTII,95,, | Performed by: STUDENT IN AN ORGANIZED HEALTH CARE EDUCATION/TRAINING PROGRAM

## 2025-06-03 PROCEDURE — 1159F MED LIST DOCD IN RCRD: CPT | Mod: CPTII,95,, | Performed by: STUDENT IN AN ORGANIZED HEALTH CARE EDUCATION/TRAINING PROGRAM

## 2025-06-05 ENCOUNTER — PATIENT MESSAGE (OUTPATIENT)
Dept: INTERNAL MEDICINE | Facility: CLINIC | Age: 75
End: 2025-06-05
Payer: MEDICARE

## 2025-06-05 ENCOUNTER — TELEPHONE (OUTPATIENT)
Dept: VASCULAR SURGERY | Facility: CLINIC | Age: 75
End: 2025-06-05
Payer: MEDICARE

## 2025-06-06 ENCOUNTER — OFFICE VISIT (OUTPATIENT)
Dept: GASTROENTEROLOGY | Facility: CLINIC | Age: 75
End: 2025-06-06
Payer: MEDICARE

## 2025-06-06 ENCOUNTER — PATIENT MESSAGE (OUTPATIENT)
Dept: GASTROENTEROLOGY | Facility: CLINIC | Age: 75
End: 2025-06-06

## 2025-06-06 VITALS — HEIGHT: 63 IN | BODY MASS INDEX: 19.49 KG/M2

## 2025-06-06 DIAGNOSIS — D50.8 OTHER IRON DEFICIENCY ANEMIA: Primary | ICD-10-CM

## 2025-06-06 DIAGNOSIS — K55.21 ANGIODYSPLASIA OF COLON WITH HEMORRHAGE: ICD-10-CM

## 2025-06-06 PROCEDURE — 99999 PR PBB SHADOW E&M-EST. PATIENT-LVL III: CPT | Mod: PBBFAC,TXP,, | Performed by: INTERNAL MEDICINE

## 2025-06-10 ENCOUNTER — INITIAL CONSULT (OUTPATIENT)
Dept: VASCULAR SURGERY | Facility: CLINIC | Age: 75
End: 2025-06-10
Payer: MEDICARE

## 2025-06-10 VITALS
SYSTOLIC BLOOD PRESSURE: 121 MMHG | WEIGHT: 110.25 LBS | HEIGHT: 63 IN | BODY MASS INDEX: 19.54 KG/M2 | DIASTOLIC BLOOD PRESSURE: 61 MMHG | HEART RATE: 87 BPM

## 2025-06-10 DIAGNOSIS — I74.09 AORTOILIAC OCCLUSIVE DISEASE: ICD-10-CM

## 2025-06-10 DIAGNOSIS — I73.9 PAD (PERIPHERAL ARTERY DISEASE): Primary | ICD-10-CM

## 2025-06-10 PROCEDURE — 1111F DSCHRG MED/CURRENT MED MERGE: CPT | Mod: CPTII,NTX,S$GLB, | Performed by: SURGERY

## 2025-06-10 PROCEDURE — 99215 OFFICE O/P EST HI 40 MIN: CPT | Mod: NTX,S$GLB,, | Performed by: SURGERY

## 2025-06-10 PROCEDURE — 99999 PR PBB SHADOW E&M-EST. PATIENT-LVL III: CPT | Mod: PBBFAC,TXP,, | Performed by: SURGERY

## 2025-06-10 NOTE — PROGRESS NOTES
VASCULAR SURGERY SERVICE      CHIEF COMPLAINT:  Severe PAD    HISTORY OF PRESENT ILLNESS: Jessica Floyd is a 74 y.o. female with oxygen-dependent COPD, history of life-threatening GI bleed requiring surgery 1 year ago and then very recent episode proximally 10 days ago requiring IR embolization.        Around the the time of her profound anemia and recent hospitalization she also noted some weakness of the left leg.   She states that since getting back home, she is able to walk around her home using a rolling walker which she was doing before this event.  She also admits to some left knee pain.  She denies any pain at rest in her legs or feet.  However she does state that overall she is extraordinarily deconditioned, both legs feel fairly weak.    She denies any history of coronary artery disease    She also noticed that she has nonhealing wound on the lateral left ankle which has been present for proximally 1 month    She is currently taking 1 baby aspirin    Past Medical History:   Diagnosis Date    Allergic rhinitis     Amblyopia     Carotid stenosis     Coronary atherosclerosis     Outside [facility] 6/2014: 20% mLAD, 50% mCx, 20%, mRCA    COVID-19 11/15/2023    Dyslipidemia     ESBL (extended spectrum beta-lactamase) producing bacteria infection     UTI    Hypertension     Influenza A 12/28/2023    Nausea and vomiting 05/26/2017    Pulmonary emphysema 10/28/2023    SAH (subarachnoid hemorrhage) 08/24/2016 1999, s/p clipping    Subarachnoid hemorrhage due to ruptured aneurysm 1999       Past Surgical History:   Procedure Laterality Date    ADENOIDECTOMY      ANTERIOR CRUCIATE LIGAMENT REPAIR  2012    APPENDECTOMY      CATARACT EXTRACTION W/  INTRAOCULAR LENS IMPLANT Right 11/07/2022    Procedure: EXTRACTION, CATARACT, WITH IOL INSERTION;  Surgeon: Higinio Cristina MD;  Location: [facility];  Service: Ophthalmology;  Laterality: Right;    CATARACT EXTRACTION W/  INTRAOCULAR LENS IMPLANT Left 11/21/2022    Procedure:  EXTRACTION, CATARACT, WITH IOL INSERTION;  Surgeon: Higinio Cristina MD;  Location: [facility];  Service: Ophthalmology;  Laterality: Left;    CEREBRAL ANEURYSM REPAIR  1999    clip    COLONOSCOPY N/A 5/16/2024    Procedure: COLONOSCOPY;  Surgeon: Rich Syed MD;  Location: [facility] ENDO (2ND FLR);  Service: Endoscopy;  Laterality: N/A;    COLONOSCOPY, WITH RETROGRADE BALLOON ENTEROSCOPY, SINGLE OR DOUBLE BALLOON N/A 5/29/2025    Procedure: COLONOSCOPY, WITH RETROGRADE BALLOON ENTEROSCOPY, SINGLE OR DOUBLE BALLOON;  Surgeon: Rich Syed MD;  Location: [facility] ENDO (2ND FLR);  Service: Endoscopy;  Laterality: N/A;    DENTAL SURGERY      DIAGNOSTIC LAPAROSCOPY N/A 5/17/2024    Procedure: LAPAROSCOPY, DIAGNOSTIC;  Surgeon: Ruben Oscar MD;  [address];  Service: General;  Laterality: N/A;    ESOPHAGOGASTRODUODENOSCOPY N/A 5/15/2024    Procedure: EGD (ESOPHAGOGASTRODUODENOSCOPY);  Surgeon: Rich Syed MD;  Location: [facility] ENDO (2ND FLR);  Service: Endoscopy;  Laterality: N/A;    ESOPHAGOGASTRODUODENOSCOPY N/A 5/28/2025    Procedure: EGD (ESOPHAGOGASTRODUODENOSCOPY);  Surgeon: Rich Syed MD;  Location: [facility] ENDO (2ND FLR);  Service: Endoscopy;  Laterality: N/A;    EXCISION, SMALL INTESTINE N/A 5/17/2024    Procedure: EXCISION, SMALL INTESTINE;  Surgeon: Ruben Oscar MD;  [address];  Service: General;  Laterality: N/A;    EYE SURGERY Bilateral     cataract removal and lens implants    HIP ARTHROPLASTY Left 05/28/2023    Procedure: TOTAL HIP ARTHROPLASTY;  Surgeon: Juan Wan MD;  [facility];  Service: Orthopedics;  Laterality: Left;    LAPAROTOMY, EXPLORATORY N/A 5/17/2024    Procedure: LAPAROTOMY, EXPLORATORY;  Surgeon: Ruben Oscar MD;  [facility];  Service: General;  Laterality: N/A;    REPAIR, HERNIA, INGUINAL Bilateral 5/17/2024    Procedure: REPAIR, HERNIA, INGUINAL;  Surgeon: Ruben Oscar MD;   "Location: [facility] OR [address] [place] [address];  Service: General;  Laterality: Bilateral;    TONSILLECTOMY         Current Medications[1]    Review of patient's allergies indicates:   Allergen Reactions    Hydromorphone Anxiety, Other (See Comments) and Hallucinations     Severe agitation       Family History   Problem Relation Name Age of Onset    Hypertension Mother      Aneurysm Mother      Hypertension Father      Alcohol abuse Father      Kidney disease Brother      Heart disease Brother      Gout Brother      No Known Problems Daughter Hannah     No Known Problems Daughter Aby     No Known Problems Daughter Diya        Social History[2]    PHYSICAL EXAM:   /61 (BP Location: Left arm, Patient Position: Sitting)   Pulse 87   Ht 5' 3" (1.6 m)   Wt 50 kg (110 lb 3.7 oz)   BMI 19.53 kg/m²   Constitutional:  Alert,   She appears extraordinarily frail   Neurological: Normal speech  no focal findings  CN II - XII grossly intact.    Psychiatric: Mood and affect appropriate and symmetric.   HEENT: Normocephalic / atraumatic  PERRLA  Midline trachea  No scars across the neck   Cardiac: Regular rate and rhythm.   Pulmonary: Normal pulmonary effort.   Abdomen: Soft, not distended.     Skin: Warm and well perfused.    Vascular:  Right femoral pulse 1 +left femoral pulse absent pedal pulses absent   Extremities/  Musculoskeletal: No edema.   Both feet have surprisingly good cap refill at the toes no ulcerations or tissue loss in the feet no cyanosis.    2 cm ulceration over the left lateral malleolus     IMAGING:  ABIs are 0.29 right, 0 left PVR waveforms in the left are essentially flat line throughout    Recent CT scan of the abdomen and pelvis was personally reviewed and demonstrates:  Left common iliac and external iliac occlusion with highly highly calcific common femoral artery and an reconstitution of the proximal SFA, very diminutive profunda  Very severe terminal aorta calcification extending into the left and right, with " "a greater than 70% RIGHT common iliac stenosis.  Right common and external iliacs are patent with a greater than 90% right common femoral artery stenosis that is extraordinarily calcific    IMPRESSION:   Severe PAD L>R.  Surprisingly she is not having ischemic rest pain.  She does have a left lower extremity ulceration although this is at the lateral malleolus and is not primarily ischemic.  That being said, due to the profound lack of our good arterial flow she will have a challenging time healing this  Severely deconditioned with recent hospitalization for life-threatening anemia  Oxygen-dependent COPD    I had a greater than 40 minute conversation with the patient and her .   Given the very very severe calcific nature of her occlusions, she does not have any reasonable completely percutaneous options for revascularization of her left leg.   The "least invasive"  operation would require bilateral femoral endarterectomy is, right-to-left fem-fem bypass and inflow iliac stenting.    That her extraordinarily deconditioned state, believe she would have fairly prohibitive operative risk at this time.   She understands this and states she would be unwilling to have any surgery at this juncture even if I had recommended it.    After much discussion she and her  wished to continue intensive rehab and physical therapy to see how she does with her leg in her overall health.  Again, remarkably she seems to be ambulating around the house despite her very severe PVD and not having ischemic rest pain    PLAN:  Continue with current aggressive physical therapy and rehab  They have asked to come back to see me in 6 weeks' time after the  returns from a trip to [facility] with a repeat ABIs    If if at that time there is consideration of wanting to pursue revascularization would then get a aorta and runoff CTA and 2D echo      KAYLI Kwon III, MD, FACS  Professor and Chief, Vascular and Endovascular " Surgery           [1]   Current Outpatient Medications:     [Paused] amLODIPine (NORVASC) 5 MG tablet, Take 1 tablet (5 mg total) by mouth once daily., Disp: , Rfl:     aspirin (ECOTRIN) 81 MG EC tablet, Take 1 tablet (81 mg total) by mouth once daily., Disp: 30 tablet, Rfl: 11    atorvastatin (LIPITOR) 80 MG tablet, TAKE 1 TABLET BY MOUTH EVERY DAY (Patient taking differently: Take 80 mg by mouth once daily.), Disp: 90 tablet, Rfl: 3    celecoxib (CELEBREX) 200 MG capsule, TAKE 1 CAPSULE BY MOUTH DAILY AS NEEDED FOR PAIN., Disp: 30 capsule, Rfl: 2    colchicine (COLCRYS) 0.6 mg tablet, TAKE 1 TABLET (0.6 MG TOTAL) BY MOUTH ONCE DAILY. AS NEEDED FOR GOUT, Disp: 90 tablet, Rfl: 3    cyanocobalamin 1,000 mcg/mL injection, Inject 1,000 mcg into the muscle every 7 days., Disp: 10 mL, Rfl: 5    fluticasone propionate (FLONASE) 50 mcg/actuation nasal spray, 2 sprays (100 mcg total) by Each Nostril route once daily., Disp: , Rfl:     fluticasone-umeclidin-vilanter (TRELEGY ELLIPTA) 200-62.5-25 mcg inhaler, Inhale 1 puff into the lungs once daily., Disp: 60 each, Rfl: 11    gabapentin (NEURONTIN) 300 MG capsule, Take 1 capsule (300 mg total) by mouth 3 (three) times daily., Disp: 90 capsule, Rfl: 0    [Paused] hydroCHLOROthiazide (HYDRODIURIL) 25 MG tablet, Take 1 tablet (25 mg total) by mouth once daily., Disp: 90 tablet, Rfl: 3    LIDOcaine 4 % Gel, Apply 1 g topically 2 (two) times daily as needed (foot pain)., Disp: , Rfl:     LIDOcaine-prilocaine (EMLA) cream, Apply topically as needed., Disp: 30 g, Rfl: 3    methocarbamoL (ROBAXIN) 500 MG Tab, Take 1 tablet (500 mg total) by mouth every 6 (six) hours as needed (for muscle spasms of legs)., Disp: 30 tablet, Rfl: 0    mirtazapine (REMERON) 7.5 MG Tab, Take 1 tablet (7.5 mg total) by mouth every evening., Disp: 90 tablet, Rfl: 1    montelukast (SINGULAIR) 10 mg tablet, Take 10 mg by mouth every evening., Disp: , Rfl:     pantoprazole (PROTONIX) 40 MG tablet, Take 1  tablet (40 mg total) by mouth 2 (two) times daily., Disp: 180 tablet, Rfl: 3    QUEtiapine (SEROQUEL) 50 MG tablet, Take 1 tablet (50 mg total) by mouth 2 (two) times daily., Disp: 180 tablet, Rfl: 1    syringe, disposable, 1 mL Syrg, 1 Stick by Misc.(Non-Drug; Combo Route) route once a week., Disp: 25 each, Rfl: 1    allopurinoL (ZYLOPRIM) 300 MG tablet, Take 300 mg by mouth once daily. (Patient not taking: Reported on 6/10/2025), Disp: , Rfl:     [Paused] carvediloL (COREG) 6.25 MG tablet, Take 1 tablet (6.25 mg total) by mouth 2 (two) times daily. (Patient not taking: Reported on 6/10/2025), Disp: 180 tablet, Rfl: 3  No current facility-administered medications for this visit.    Facility-Administered Medications Ordered in Other Visits:     mupirocin 2 % ointment, , Nasal, On Call Procedure, Kang Diaz MD, Given at 10/25/23 1045    tranexamic acid (CYKLOKAPRON) 1,000 mg in sodium chloride 0.9 % 100 mL IVPB (MB+), 1,000 mg, Intravenous, Once, Kang Diaz MD    tranexamic acid (CYKLOKAPRON) 1,000 mg in sodium chloride 0.9 % 100 mL IVPB (MB+), 1,000 mg, Intravenous, Once, Kang Diaz MD  [2]   Social History  Tobacco Use    Smoking status: Former     Current packs/day: 0.00     Average packs/day: 0.5 packs/day for 20.0 years (10.0 ttl pk-yrs)     Types: Cigarettes     Start date: 1979     Quit date: 1999     Years since quittin.4     Passive exposure: Past    Smokeless tobacco: Never   Substance Use Topics    Alcohol use: Yes     Alcohol/week: 14.0 standard drinks of alcohol     Types: 14 Glasses of wine per week     Comment: 1 or 2 glasses of red wine    Drug use: No

## 2025-06-11 ENCOUNTER — PATIENT MESSAGE (OUTPATIENT)
Dept: INTERNAL MEDICINE | Facility: CLINIC | Age: 75
End: 2025-06-11
Payer: MEDICARE

## 2025-06-11 NOTE — TELEPHONE ENCOUNTER
LOV with  Marya Ferguson MD , 6/03/2025    Patient states she was put back on the Hydrochlorothiazide and her left foot and ankle are painfully swollen    Message dated 6/5/25:  Hello, I think its fine to restart hydrochlorothiazide and continue to monitor your blood pressure. If blood pressure is lower than 110/60 do not take your medicine. If persistently higher than 140/90 we can restart a second medication.

## 2025-06-15 ENCOUNTER — PATIENT MESSAGE (OUTPATIENT)
Dept: INTERNAL MEDICINE | Facility: CLINIC | Age: 75
End: 2025-06-15
Payer: MEDICARE

## 2025-06-15 DIAGNOSIS — I50.30 HEART FAILURE WITH PRESERVED EJECTION FRACTION, UNSPECIFIED HF CHRONICITY: Primary | Chronic | ICD-10-CM

## 2025-06-16 RX ORDER — FUROSEMIDE 20 MG/1
20 TABLET ORAL 2 TIMES DAILY PRN
Qty: 30 TABLET | Refills: 1 | Status: SHIPPED | OUTPATIENT
Start: 2025-06-16 | End: 2025-09-14

## 2025-06-16 NOTE — DISCHARGE SUMMARY
"[facility] - Cardiology [facility] Medicine  Discharge Summary      Patient Name: Jessica Floyd  MRN: [id number]  LENORA: [id number]  Patient Class: IP- Inpatient  Admission Date: 5/27/2025  Hospital Length of Stay: 4 days  Discharge Date and Time: 5/31/2025  6:52 PM  Attending Physician: Renetta att. providers found   Discharging Provider: Rody Klein MD  Primary Care Provider: Jace Valencia MD  [facility] Medicine Team: [facility] Rody Klein MD  Primary Care Team: [facility]    HPI:   Jessica Floyd is a 74 y.o. female with COPD, HFpEF, CAD, ERIK, history of GIB with small bowel resection/ IR embolization last year presenting with weakness and dark stool.    Patient and  at bedside provide history.     They report that this morning, she had a large-volume episode of fecal incontinence, which was "dark like hot fudge" and very watery. She has intermittent dark stools at baseline, however never this voluminous or severe. She has chronic pre-syncopal episodes and experienced another today while on the toilet.  Throughout the day, she has had significant weakness and fatigue, to the point where she had to be carried to the car.  At baseline, she ambulates with a walker, and sometimes gets around with a wheelchair.  They presented to primary care clinic, and she was noted to be hypotensive, improved with IV fluids.  She was therefore sent to the ED for further evaluation.    She uses oxygen p.r.n. for shortness for breath, but mainly at night.  Denies any NSAID use.   Drinks 1-2 glasses of wine per day.     She denies any abdominal pain, nausea, vomiting, or fever.     Procedure(s) (LRB):  COLONOSCOPY, WITH RETROGRADE BALLOON ENTEROSCOPY, SINGLE OR DOUBLE BALLOON (N/A)      Hospital Course:    74 y.o. female with history of COPD, HFpEF, CAD, ERIK, hx bleeding from the cecum s/p IR embolization May 2024, incarcerated inguinal hernia s/p SB resection (17 cm) May 2024 who was admitted with dark " melanotic stool/presyncope.  Hemoglobin 4.9 on presentation status post 2 unit PRBC transfusion hemoglobin improved to 7.8.  CTA negative for GI bleed.  GI was consulted status post EGD on 05/28/2025 with no evidence of active bleed plan for C scope in a.m. required a dose of Ativan for anxiety resume home Remeron/Seroquel for anxiety..  Noted to have left ankle wound initiated on doxy and wound care consulted. CT with New occlusion of the left common iliac and left external iliac arteries. There is distal reconstitution   Pending lower extremity arterial and venous ultrasound. Given patient is symptomatic with LLE pain and ulcer, vascular surgery consulted but after discussion with family decided to do close outpatient follow up. Patient tolerated PO well, hgb remained stable. PT/OT consulted because of her limited mobility 2/2 to increased ankle pain. Palliative care consulted for family support. She was discharged home with ne asprin script. Bactrim to cover a UTI, and told to hold her BP meds as they her BP had been quite soft while in the hospital.      Goals of Care Treatment Preferences:  Code Status: Full Code          What is most important right now is to focus on spending time at home, avoiding the hospital, remaining as independent as possible, symptom/pain control, quality of life, even if it means sacrificing a little time.  Accordingly, we have decided that the best plan to meet the patient's goals includes continuing with treatment, allowing for additional time for goals of care discussions.      SDOH Screening:  The patient was screened for utility difficulties, food insecurity, transport difficulties, housing insecurity, and interpersonal safety and there were no concerns identified this admission.     Consults:   Consults (From admission, onward)          Status Ordering Provider     Inpatient consult to Palliative Care  Once        Provider:  (Not yet assigned)    WEST Serrano      Inpatient consult to Vascular Surgery  Once        Provider:  (Not yet assigned)    Completed WEST STOLL     Inpatient consult to Registered Dietitian/Nutritionist  Once        Provider:  (Not yet assigned)    Completed ISAÍAS RASHID     Inpatient consult to Gastroenterology  Once        Provider:  (Not yet assigned)    Completed ALEIDA HEWITT            Assessment & Plan      Final Active Diagnoses:    Diagnosis Date Noted POA    PRINCIPAL PROBLEM:  Acute GI bleeding [K92.2] 05/14/2024 Yes     Chronic    Palliative care encounter [Z51.5] 05/30/2025 Not Applicable    Acute cystitis without hematuria [N30.00] 05/29/2025 Yes    Aortoiliac occlusive disease [I74.09] 05/29/2025 Yes    PAD (peripheral artery disease) [I73.9] 05/29/2025 Yes    Hypomagnesemia [E83.42] 05/28/2025 Yes     Chronic    Body mass index (BMI) 19.9 or less, adult [Z68.1] 05/28/2025 Yes    Abrasion [T14.8XXA] 05/22/2024 Yes     Chronic    (HFpEF) heart failure with preserved ejection fraction [I50.30] 03/26/2024 Yes     Chronic    Pulmonary emphysema [J43.9] 11/16/2023 Yes     Chronic    Iron deficiency anemia secondary to blood loss (chronic) [D50.0] 04/17/2023 Yes     Chronic    Hypertension [I10]  Yes     Chronic    Hypokalemia [E87.6] 05/26/2017 Yes     Chronic    Gastroesophageal reflux disease without esophagitis [K21.9] 03/20/2017 Yes     Chronic    Coronary artery disease involving native coronary artery of native heart without angina pectoris [I25.10] 08/24/2016 Yes     Chronic      Problems Resolved During this Admission:       Discharged Condition: good    Disposition: Home or Self Care    Follow Up:    Patient Instructions:      Ambulatory referral/consult to Vascular Surgery   Standing Status: Future   Referral Priority: Urgent Referral Type: Consultation   Referral Reason: Specialty Services Required   Requested Specialty: Vascular Surgery   Number of Visits Requested: 1     Ambulatory referral/consult to  Internal Medicine   Standing Status: Future   Referral Priority: Routine Referral Type: Consultation   Referral Reason: Specialty Services Required   Requested Specialty: Internal Medicine   Number of Visits Requested: 1       Significant Diagnostic Studies: see chart     Pending Diagnostic Studies:       None           Medications:  Reconciled Home Medications:      Medication List        PAUSE taking these medications      amLODIPine 5 MG tablet  Wait to take this until your doctor or other care provider tells you to start again.  Commonly known as: NORVASC  Take 1 tablet (5 mg total) by mouth once daily.     carvediloL 6.25 MG tablet  Wait to take this until your doctor or other care provider tells you to start again.  Commonly known as: COREG  Take 1 tablet (6.25 mg total) by mouth 2 (two) times daily.     hydroCHLOROthiazide 25 MG tablet  Wait to take this until your doctor or other care provider tells you to start again.  Commonly known as: HYDRODIURIL  Take 1 tablet (25 mg total) by mouth once daily.            START taking these medications      aspirin 81 MG EC tablet  Commonly known as: ECOTRIN  Take 1 tablet (81 mg total) by mouth once daily.     gabapentin 300 MG capsule  Commonly known as: NEURONTIN  Take 1 capsule (300 mg total) by mouth 3 (three) times daily.            CONTINUE taking these medications      allopurinoL 300 MG tablet  Commonly known as: ZYLOPRIM  Take 300 mg by mouth once daily.     atorvastatin 80 MG tablet  Commonly known as: LIPITOR  TAKE 1 TABLET BY MOUTH EVERY DAY     celecoxib 200 MG capsule  Commonly known as: CeleBREX  TAKE 1 CAPSULE BY MOUTH DAILY AS NEEDED FOR PAIN.     colchicine 0.6 mg tablet  Commonly known as: COLCRYS  TAKE 1 TABLET (0.6 MG TOTAL) BY MOUTH ONCE DAILY. AS NEEDED FOR GOUT     cyanocobalamin 1,000 mcg/mL injection  Inject 1,000 mcg into the muscle every 7 days.     fluticasone propionate 50 mcg/actuation nasal spray  Commonly known as: FLONASE  2 sprays  (100 mcg total) by Each Nostril route once daily.     fluticasone-umeclidin-vilanter 200-62.5-25 mcg inhaler  Commonly known as: TRELEGY ELLIPTA  Inhale 1 puff into the lungs once daily.     LIDOcaine 4 % Gel  Apply 1 g topically 2 (two) times daily as needed (foot pain).     LIDOcaine-prilocaine cream  Commonly known as: EMLA  Apply topically as needed.     mirtazapine 7.5 MG Tab  Commonly known as: REMERON  Take 1 tablet (7.5 mg total) by mouth every evening.     montelukast 10 mg tablet  Commonly known as: SINGULAIR  Take 10 mg by mouth every evening.     pantoprazole 40 MG tablet  Commonly known as: PROTONIX  Take 1 tablet (40 mg total) by mouth 2 (two) times daily.     QUEtiapine 50 MG tablet  Commonly known as: SEROQUEL  Take 1 tablet (50 mg total) by mouth 2 (two) times daily.     syringe (disposable) 1 mL Syrg  1 Stick by Misc.(Non-Drug; Combo Route) route once a week.            STOP taking these medications      erythromycin ophthalmic ointment  Commonly known as: ROMYCIN            ASK your doctor about these medications      methocarbamoL 500 MG Tab  Commonly known as: Robaxin  Take 1 tablet (500 mg total) by mouth every 6 (six) hours as needed (for muscle spasms of legs).  Ask about: Should I take this medication?     sulfamethoxazole-trimethoprim 400-80mg 400-80 mg per tablet  Commonly known as: BACTRIM,SEPTRA  Take 1 tablet by mouth 2 (two) times daily. for 3 days  Ask about: Should I take this medication?              Indwelling Lines/Drains at time of discharge:   Lines/Drains/Airways       None                   Time spent on the discharge of patient: 35 minutes         Rody Klein MD  Department of Hospital Medicine  [facility] - Cardiology Stepdown

## 2025-06-19 ENCOUNTER — LAB VISIT (OUTPATIENT)
Dept: LAB | Facility: OTHER | Age: 75
End: 2025-06-19
Attending: INTERNAL MEDICINE
Payer: MEDICARE

## 2025-06-19 ENCOUNTER — PATIENT MESSAGE (OUTPATIENT)
Dept: INTERNAL MEDICINE | Facility: CLINIC | Age: 75
End: 2025-06-19
Payer: MEDICARE

## 2025-06-19 DIAGNOSIS — K55.21 ANGIODYSPLASIA OF COLON WITH HEMORRHAGE: ICD-10-CM

## 2025-06-19 DIAGNOSIS — D50.8 OTHER IRON DEFICIENCY ANEMIA: ICD-10-CM

## 2025-06-19 LAB
ABSOLUTE EOSINOPHIL (OHS): 0.46 K/UL
ABSOLUTE MONOCYTE (OHS): 3.69 K/UL (ref 0.3–1)
ABSOLUTE NEUTROPHIL COUNT (OHS): 6.27 K/UL (ref 1.8–7.7)
BASOPHILS # BLD AUTO: 0.29 K/UL
BASOPHILS NFR BLD AUTO: 2.2 %
ERYTHROCYTE [DISTWIDTH] IN BLOOD BY AUTOMATED COUNT: 19.5 % (ref 11.5–14.5)
FERRITIN SERPL-MCNC: 29 NG/ML (ref 20–300)
HCT VFR BLD AUTO: 20.5 % (ref 37–48.5)
HGB BLD-MCNC: 6 GM/DL (ref 12–16)
IMM GRANULOCYTES # BLD AUTO: 0.35 K/UL (ref 0–0.04)
IMM GRANULOCYTES NFR BLD AUTO: 2.7 % (ref 0–0.5)
IRON SATN MFR SERPL: 3 % (ref 20–50)
IRON SERPL-MCNC: 12 UG/DL (ref 30–160)
LYMPHOCYTES # BLD AUTO: 1.89 K/UL (ref 1–4.8)
MCH RBC QN AUTO: 28 PG (ref 27–31)
MCHC RBC AUTO-ENTMCNC: 29.3 G/DL (ref 32–36)
MCV RBC AUTO: 96 FL (ref 82–98)
NUCLEATED RBC (/100WBC) (OHS): 0 /100 WBC
PLATELET # BLD AUTO: 483 K/UL (ref 150–450)
PMV BLD AUTO: 10.1 FL (ref 9.2–12.9)
RBC # BLD AUTO: 2.14 M/UL (ref 4–5.4)
RELATIVE EOSINOPHIL (OHS): 3.6 %
RELATIVE LYMPHOCYTE (OHS): 14.6 % (ref 18–48)
RELATIVE MONOCYTE (OHS): 28.5 % (ref 4–15)
RELATIVE NEUTROPHIL (OHS): 48.4 % (ref 38–73)
TIBC SERPL-MCNC: 435 UG/DL (ref 250–450)
TRANSFERRIN SERPL-MCNC: 294 MG/DL (ref 200–375)
WBC # BLD AUTO: 12.95 K/UL (ref 3.9–12.7)

## 2025-06-19 PROCEDURE — 36415 COLL VENOUS BLD VENIPUNCTURE: CPT | Mod: TXP

## 2025-06-19 PROCEDURE — 85025 COMPLETE CBC W/AUTO DIFF WBC: CPT | Mod: TXP

## 2025-06-19 PROCEDURE — 82728 ASSAY OF FERRITIN: CPT | Mod: TXP

## 2025-06-19 PROCEDURE — 84466 ASSAY OF TRANSFERRIN: CPT | Mod: TXP

## 2025-06-20 ENCOUNTER — PATIENT MESSAGE (OUTPATIENT)
Dept: HEMATOLOGY/ONCOLOGY | Facility: CLINIC | Age: 75
End: 2025-06-20
Payer: MEDICARE

## 2025-06-20 ENCOUNTER — HOSPITAL ENCOUNTER (OUTPATIENT)
Facility: HOSPITAL | Age: 75
Discharge: HOME | End: 2025-06-21
Attending: EMERGENCY MEDICINE
Payer: MEDICARE

## 2025-06-20 DIAGNOSIS — D62 ACUTE BLOOD LOSS ANEMIA: Primary | ICD-10-CM

## 2025-06-20 DIAGNOSIS — R07.9 CHEST PAIN: ICD-10-CM

## 2025-06-20 DIAGNOSIS — R55 SYNCOPE: ICD-10-CM

## 2025-06-20 DIAGNOSIS — N18.31 CKD STAGE G3A/A1, GFR 45-59 AND ALBUMIN CREATININE RATIO <30 MG/G: ICD-10-CM

## 2025-06-20 DIAGNOSIS — D64.9 SYMPTOMATIC ANEMIA: ICD-10-CM

## 2025-06-20 LAB
ABO + RH BLD: NORMAL
ABSOLUTE EOSINOPHIL (OHS): 0.37 K/UL
ABSOLUTE EOSINOPHIL (OHS): 0.47 K/UL
ABSOLUTE MONOCYTE (OHS): 3.34 K/UL (ref 0.3–1)
ABSOLUTE MONOCYTE (OHS): 3.86 K/UL (ref 0.3–1)
ABSOLUTE NEUTROPHIL COUNT (OHS): 6.23 K/UL (ref 1.8–7.7)
ABSOLUTE NEUTROPHIL COUNT (OHS): 6.92 K/UL (ref 1.8–7.7)
ALBUMIN SERPL BCP-MCNC: 2.7 G/DL (ref 3.5–5.2)
ALP SERPL-CCNC: 85 UNIT/L (ref 40–150)
ALT SERPL W/O P-5'-P-CCNC: 12 UNIT/L (ref 10–44)
ANION GAP (OHS): 10 MMOL/L (ref 8–16)
ANISOCYTOSIS BLD QL SMEAR: NORMAL
AST SERPL-CCNC: 21 UNIT/L (ref 11–45)
BASOPHILS # BLD AUTO: 0.23 K/UL
BASOPHILS # BLD AUTO: 0.3 K/UL
BASOPHILS NFR BLD AUTO: 1.7 %
BASOPHILS NFR BLD AUTO: 2.3 %
BILIRUB SERPL-MCNC: 0.5 MG/DL (ref 0.1–1)
BLD PROD TYP BPU: NORMAL
BLOOD UNIT EXPIRATION DATE: NORMAL
BLOOD UNIT TYPE CODE: 6200
BUN SERPL-MCNC: 34 MG/DL (ref 8–23)
CALCIUM SERPL-MCNC: 7.8 MG/DL (ref 8.7–10.5)
CHLORIDE SERPL-SCNC: 96 MMOL/L (ref 95–110)
CO2 SERPL-SCNC: 26 MMOL/L (ref 23–29)
CREAT SERPL-MCNC: 1.1 MG/DL (ref 0.5–1.4)
CROSSMATCH INTERPRETATION: NORMAL
DISPENSE STATUS: NORMAL
ERYTHROCYTE [DISTWIDTH] IN BLOOD BY AUTOMATED COUNT: 18.6 % (ref 11.5–14.5)
ERYTHROCYTE [DISTWIDTH] IN BLOOD BY AUTOMATED COUNT: 19.7 % (ref 11.5–14.5)
GFR SERPLBLD CREATININE-BSD FMLA CKD-EPI: 53 ML/MIN/1.73/M2
GLUCOSE SERPL-MCNC: 97 MG/DL (ref 70–110)
HCT VFR BLD AUTO: 20.5 % (ref 37–48.5)
HCT VFR BLD AUTO: 24.1 % (ref 37–48.5)
HCV AB SERPL QL IA: NORMAL
HGB BLD-MCNC: 6 GM/DL (ref 12–16)
HGB BLD-MCNC: 7 GM/DL (ref 12–16)
HIV 1+2 AB+HIV1 P24 AG SERPL QL IA: NORMAL
HOLD SPECIMEN: NORMAL
HOLD SPECIMEN: NORMAL
HYPOCHROMIA BLD QL SMEAR: NORMAL
IMM GRANULOCYTES # BLD AUTO: 0.35 K/UL (ref 0–0.04)
IMM GRANULOCYTES # BLD AUTO: 0.42 K/UL (ref 0–0.04)
IMM GRANULOCYTES NFR BLD AUTO: 2.7 % (ref 0–0.5)
IMM GRANULOCYTES NFR BLD AUTO: 3.1 % (ref 0–0.5)
INDIRECT COOMBS: NORMAL
LYMPHOCYTES # BLD AUTO: 1.75 K/UL (ref 1–4.8)
LYMPHOCYTES # BLD AUTO: 2.01 K/UL (ref 1–4.8)
MAGNESIUM SERPL-MCNC: 1.4 MG/DL (ref 1.6–2.6)
MCH RBC QN AUTO: 26.8 PG (ref 27–31)
MCH RBC QN AUTO: 27.3 PG (ref 27–31)
MCHC RBC AUTO-ENTMCNC: 29 G/DL (ref 32–36)
MCHC RBC AUTO-ENTMCNC: 29.3 G/DL (ref 32–36)
MCV RBC AUTO: 92 FL (ref 82–98)
MCV RBC AUTO: 93 FL (ref 82–98)
NUCLEATED RBC (/100WBC) (OHS): 0 /100 WBC
NUCLEATED RBC (/100WBC) (OHS): 1 /100 WBC
OHS QRS DURATION: 80 MS
OHS QTC CALCULATION: 464 MS
OVALOCYTES BLD QL SMEAR: NORMAL
PLATELET # BLD AUTO: 437 K/UL (ref 150–450)
PLATELET # BLD AUTO: 478 K/UL (ref 150–450)
PLATELET BLD QL SMEAR: NORMAL
PMV BLD AUTO: 10 FL (ref 9.2–12.9)
PMV BLD AUTO: 10.2 FL (ref 9.2–12.9)
POIKILOCYTOSIS BLD QL SMEAR: SLIGHT
POLYCHROMASIA BLD QL SMEAR: NORMAL
POTASSIUM SERPL-SCNC: 3.9 MMOL/L (ref 3.5–5.1)
PROT SERPL-MCNC: 6.4 GM/DL (ref 6–8.4)
RBC # BLD AUTO: 2.2 M/UL (ref 4–5.4)
RBC # BLD AUTO: 2.61 M/UL (ref 4–5.4)
RELATIVE EOSINOPHIL (OHS): 2.9 %
RELATIVE EOSINOPHIL (OHS): 3.5 %
RELATIVE LYMPHOCYTE (OHS): 13.6 % (ref 18–48)
RELATIVE LYMPHOCYTE (OHS): 15 % (ref 18–48)
RELATIVE MONOCYTE (OHS): 24.9 % (ref 4–15)
RELATIVE MONOCYTE (OHS): 30 % (ref 4–15)
RELATIVE NEUTROPHIL (OHS): 48.5 % (ref 38–73)
RELATIVE NEUTROPHIL (OHS): 51.8 % (ref 38–73)
RH BLD: NORMAL
SODIUM SERPL-SCNC: 132 MMOL/L (ref 136–145)
SPECIMEN OUTDATE: NORMAL
SPHEROCYTES BLD QL SMEAR: NORMAL
UNIT NUMBER: NORMAL
WBC # BLD AUTO: 12.86 K/UL (ref 3.9–12.7)
WBC # BLD AUTO: 13.39 K/UL (ref 3.9–12.7)

## 2025-06-20 PROCEDURE — 85025 COMPLETE CBC W/AUTO DIFF WBC: CPT | Mod: NTX

## 2025-06-20 PROCEDURE — 63600175 PHARM REV CODE 636 W HCPCS: Mod: NTX

## 2025-06-20 PROCEDURE — P9016 RBC LEUKOCYTES REDUCED: HCPCS | Mod: NTX

## 2025-06-20 PROCEDURE — 93010 ELECTROCARDIOGRAM REPORT: CPT | Mod: NTX,,, | Performed by: INTERNAL MEDICINE

## 2025-06-20 PROCEDURE — 94640 AIRWAY INHALATION TREATMENT: CPT | Mod: NTX

## 2025-06-20 PROCEDURE — G0378 HOSPITAL OBSERVATION PER HR: HCPCS | Mod: NTX

## 2025-06-20 PROCEDURE — 83735 ASSAY OF MAGNESIUM: CPT | Mod: NTX

## 2025-06-20 PROCEDURE — 25000242 PHARM REV CODE 250 ALT 637 W/ HCPCS: Mod: NTX

## 2025-06-20 PROCEDURE — 94761 N-INVAS EAR/PLS OXIMETRY MLT: CPT | Mod: NTX

## 2025-06-20 PROCEDURE — 99285 EMERGENCY DEPT VISIT HI MDM: CPT | Mod: 25,NTX

## 2025-06-20 PROCEDURE — 86803 HEPATITIS C AB TEST: CPT | Mod: NTX | Performed by: EMERGENCY MEDICINE

## 2025-06-20 PROCEDURE — 85025 COMPLETE CBC W/AUTO DIFF WBC: CPT | Mod: 91,NTX

## 2025-06-20 PROCEDURE — 94799 UNLISTED PULMONARY SVC/PX: CPT | Mod: NTX

## 2025-06-20 PROCEDURE — 27000221 HC OXYGEN, UP TO 24 HOURS: Mod: NTX

## 2025-06-20 PROCEDURE — 82040 ASSAY OF SERUM ALBUMIN: CPT | Mod: NTX

## 2025-06-20 PROCEDURE — 96365 THER/PROPH/DIAG IV INF INIT: CPT | Mod: NTX

## 2025-06-20 PROCEDURE — 93005 ELECTROCARDIOGRAM TRACING: CPT | Mod: NTX

## 2025-06-20 PROCEDURE — 87389 HIV-1 AG W/HIV-1&-2 AB AG IA: CPT | Mod: NTX | Performed by: EMERGENCY MEDICINE

## 2025-06-20 PROCEDURE — 99900035 HC TECH TIME PER 15 MIN (STAT): Mod: NTX

## 2025-06-20 PROCEDURE — 25000003 PHARM REV CODE 250: Mod: NTX

## 2025-06-20 PROCEDURE — 86920 COMPATIBILITY TEST SPIN: CPT | Mod: NTX

## 2025-06-20 PROCEDURE — 36415 COLL VENOUS BLD VENIPUNCTURE: CPT | Mod: NTX

## 2025-06-20 PROCEDURE — 86850 RBC ANTIBODY SCREEN: CPT | Mod: NTX

## 2025-06-20 PROCEDURE — 36430 TRANSFUSION BLD/BLD COMPNT: CPT | Mod: NTX

## 2025-06-20 RX ORDER — PANTOPRAZOLE SODIUM 40 MG/1
40 TABLET, DELAYED RELEASE ORAL
Status: DISCONTINUED | OUTPATIENT
Start: 2025-06-20 | End: 2025-06-21 | Stop reason: HOSPADM

## 2025-06-20 RX ORDER — MONTELUKAST SODIUM 10 MG/1
10 TABLET ORAL NIGHTLY
Status: DISCONTINUED | OUTPATIENT
Start: 2025-06-20 | End: 2025-06-21 | Stop reason: HOSPADM

## 2025-06-20 RX ORDER — IBUPROFEN 200 MG
24 TABLET ORAL
Status: DISCONTINUED | OUTPATIENT
Start: 2025-06-20 | End: 2025-06-21 | Stop reason: HOSPADM

## 2025-06-20 RX ORDER — GABAPENTIN 300 MG/1
300 CAPSULE ORAL 3 TIMES DAILY
Status: DISCONTINUED | OUTPATIENT
Start: 2025-06-20 | End: 2025-06-21 | Stop reason: HOSPADM

## 2025-06-20 RX ORDER — ASPIRIN 81 MG/1
81 TABLET ORAL DAILY
Status: DISCONTINUED | OUTPATIENT
Start: 2025-06-21 | End: 2025-06-21 | Stop reason: HOSPADM

## 2025-06-20 RX ORDER — ATORVASTATIN CALCIUM 40 MG/1
80 TABLET, FILM COATED ORAL DAILY
Status: DISCONTINUED | OUTPATIENT
Start: 2025-06-21 | End: 2025-06-21 | Stop reason: HOSPADM

## 2025-06-20 RX ORDER — ALLOPURINOL 300 MG/1
300 TABLET ORAL DAILY
Status: DISCONTINUED | OUTPATIENT
Start: 2025-06-21 | End: 2025-06-21 | Stop reason: HOSPADM

## 2025-06-20 RX ORDER — QUETIAPINE FUMARATE 25 MG/1
50 TABLET, FILM COATED ORAL 2 TIMES DAILY
Status: DISCONTINUED | OUTPATIENT
Start: 2025-06-20 | End: 2025-06-21 | Stop reason: HOSPADM

## 2025-06-20 RX ORDER — TALC
6 POWDER (GRAM) TOPICAL NIGHTLY PRN
Status: DISCONTINUED | OUTPATIENT
Start: 2025-06-20 | End: 2025-06-21 | Stop reason: HOSPADM

## 2025-06-20 RX ORDER — ACETAMINOPHEN 325 MG/1
650 TABLET ORAL EVERY 8 HOURS PRN
Status: DISCONTINUED | OUTPATIENT
Start: 2025-06-20 | End: 2025-06-21 | Stop reason: HOSPADM

## 2025-06-20 RX ORDER — SODIUM CHLORIDE 0.9 % (FLUSH) 0.9 %
10 SYRINGE (ML) INJECTION EVERY 12 HOURS PRN
Status: DISCONTINUED | OUTPATIENT
Start: 2025-06-20 | End: 2025-06-21 | Stop reason: HOSPADM

## 2025-06-20 RX ORDER — ONDANSETRON 8 MG/1
8 TABLET, ORALLY DISINTEGRATING ORAL EVERY 8 HOURS PRN
Status: DISCONTINUED | OUTPATIENT
Start: 2025-06-20 | End: 2025-06-21 | Stop reason: HOSPADM

## 2025-06-20 RX ORDER — NALOXONE HCL 0.4 MG/ML
0.02 VIAL (ML) INJECTION
Status: DISCONTINUED | OUTPATIENT
Start: 2025-06-20 | End: 2025-06-21 | Stop reason: HOSPADM

## 2025-06-20 RX ORDER — IBUPROFEN 200 MG
16 TABLET ORAL
Status: DISCONTINUED | OUTPATIENT
Start: 2025-06-20 | End: 2025-06-21 | Stop reason: HOSPADM

## 2025-06-20 RX ORDER — ACETAMINOPHEN 325 MG/1
650 TABLET ORAL EVERY 4 HOURS PRN
Status: DISCONTINUED | OUTPATIENT
Start: 2025-06-20 | End: 2025-06-21 | Stop reason: HOSPADM

## 2025-06-20 RX ORDER — MAGNESIUM SULFATE HEPTAHYDRATE 40 MG/ML
2 INJECTION, SOLUTION INTRAVENOUS ONCE
Status: COMPLETED | OUTPATIENT
Start: 2025-06-20 | End: 2025-06-20

## 2025-06-20 RX ORDER — POLYETHYLENE GLYCOL 3350 17 G/17G
17 POWDER, FOR SOLUTION ORAL DAILY
Status: DISCONTINUED | OUTPATIENT
Start: 2025-06-20 | End: 2025-06-21 | Stop reason: HOSPADM

## 2025-06-20 RX ORDER — COLCHICINE 0.6 MG/1
0.6 TABLET, FILM COATED ORAL DAILY
Status: DISCONTINUED | OUTPATIENT
Start: 2025-06-21 | End: 2025-06-21 | Stop reason: HOSPADM

## 2025-06-20 RX ORDER — MIRTAZAPINE 7.5 MG/1
7.5 TABLET, FILM COATED ORAL NIGHTLY
Status: DISCONTINUED | OUTPATIENT
Start: 2025-06-20 | End: 2025-06-21 | Stop reason: HOSPADM

## 2025-06-20 RX ORDER — HYDROCHLOROTHIAZIDE 25 MG/1
25 TABLET ORAL DAILY
Status: DISCONTINUED | OUTPATIENT
Start: 2025-06-21 | End: 2025-06-21 | Stop reason: HOSPADM

## 2025-06-20 RX ORDER — GLUCAGON 1 MG
1 KIT INJECTION
Status: DISCONTINUED | OUTPATIENT
Start: 2025-06-20 | End: 2025-06-21 | Stop reason: HOSPADM

## 2025-06-20 RX ORDER — HYDROCODONE BITARTRATE AND ACETAMINOPHEN 500; 5 MG/1; MG/1
TABLET ORAL
Status: DISCONTINUED | OUTPATIENT
Start: 2025-06-20 | End: 2025-06-21 | Stop reason: HOSPADM

## 2025-06-20 RX ORDER — FLUTICASONE FUROATE AND VILANTEROL 100; 25 UG/1; UG/1
1 POWDER RESPIRATORY (INHALATION) DAILY
Status: DISCONTINUED | OUTPATIENT
Start: 2025-06-20 | End: 2025-06-21 | Stop reason: HOSPADM

## 2025-06-20 RX ADMIN — MONTELUKAST 10 MG: 10 TABLET, FILM COATED ORAL at 09:06

## 2025-06-20 RX ADMIN — GABAPENTIN 300 MG: 300 CAPSULE ORAL at 05:06

## 2025-06-20 RX ADMIN — PANTOPRAZOLE SODIUM 40 MG: 40 TABLET, DELAYED RELEASE ORAL at 05:06

## 2025-06-20 RX ADMIN — MAGNESIUM SULFATE HEPTAHYDRATE 2 G: 40 INJECTION, SOLUTION INTRAVENOUS at 02:06

## 2025-06-20 RX ADMIN — GABAPENTIN 300 MG: 300 CAPSULE ORAL at 09:06

## 2025-06-20 RX ADMIN — FLUTICASONE FUROATE AND VILANTEROL TRIFENATATE 1 PUFF: 100; 25 POWDER RESPIRATORY (INHALATION) at 04:06

## 2025-06-20 RX ADMIN — QUETIAPINE FUMARATE 50 MG: 25 TABLET ORAL at 09:06

## 2025-06-20 RX ADMIN — MIRTAZAPINE 7.5 MG: 7.5 TABLET, FILM COATED ORAL at 09:06

## 2025-06-20 NOTE — PLAN OF CARE
Update 1550hrs  BRYAN received in-basket message from [employer].  Per [employer], pt is current with [facility].  BRYAN sent message via [employer] to [facility]     06/20/25 1434   Post-Acute Status   Post-Acute Authorization Home [facility]   Home Health Status Pending medical clearance/testing   Discharge Delays None known at this time   Discharge Plan   Discharge Plan A Home Health     Pt stated that she has home health but is unsure the name of the company.  BRYAN looked at last admission 05/27 and it is unclear if pt was d/c with [employer].  BRYAN sent message to [employer] to determine.     Discharge Plan A and Plan B have been determined by review of patient's clinical status, future medical and therapeutic needs, and coverage/benefits for post-acute care in coordination with multidisciplinary team members.      Pamela Worthington CD, MSW, LMSW, RSW   Case Management  [facility]  Email: [email or web address]

## 2025-06-20 NOTE — SUBJECTIVE & OBJECTIVE
Past Medical History:   Diagnosis Date    Allergic rhinitis     Amblyopia     Carotid stenosis     Coronary atherosclerosis     Outside [facility] 6/2014: 20% mLAD, 50% mCx, 20%, mRCA    COVID-19 11/15/2023    Dyslipidemia     ESBL (extended spectrum beta-lactamase) producing bacteria infection     UTI    Hypertension     Influenza A 12/28/2023    Nausea and vomiting 05/26/2017    Pulmonary emphysema 10/28/2023    SAH (subarachnoid hemorrhage) 08/24/2016 1999, s/p clipping    Subarachnoid hemorrhage due to ruptured aneurysm 1999       Past Surgical History:   Procedure Laterality Date    ADENOIDECTOMY      ANTERIOR CRUCIATE LIGAMENT REPAIR  2012    APPENDECTOMY      CATARACT EXTRACTION W/  INTRAOCULAR LENS IMPLANT Right 11/07/2022    Procedure: EXTRACTION, CATARACT, WITH IOL INSERTION;  Surgeon: Higinio Cristina MD;  Location: [facility];  Service: Ophthalmology;  Laterality: Right;    CATARACT EXTRACTION W/  INTRAOCULAR LENS IMPLANT Left 11/21/2022    Procedure: EXTRACTION, CATARACT, WITH IOL INSERTION;  Surgeon: Higinio Cristina MD;  Location: [facility];  Service: Ophthalmology;  Laterality: Left;    CEREBRAL ANEURYSM REPAIR  1999    clip    COLONOSCOPY N/A 5/16/2024    Procedure: COLONOSCOPY;  Surgeon: Rich Syed MD;  Location: [facility] (2ND FLR);  Service: Endoscopy;  Laterality: N/A;    COLONOSCOPY, WITH RETROGRADE BALLOON ENTEROSCOPY, SINGLE OR DOUBLE BALLOON N/A 5/29/2025    Procedure: COLONOSCOPY, WITH RETROGRADE BALLOON ENTEROSCOPY, SINGLE OR DOUBLE BALLOON;  Surgeon: Rich Syed MD;  Location: [facility] (2ND FLR);  Service: Endoscopy;  Laterality: N/A;    DENTAL SURGERY      DIAGNOSTIC LAPAROSCOPY N/A 5/17/2024    Procedure: LAPAROSCOPY, DIAGNOSTIC;  Surgeon: Ruben Oscar MD;  Location: [facility] OR 2ND FLR;  Service: General;  Laterality: N/A;    ESOPHAGOGASTRODUODENOSCOPY N/A 5/15/2024    Procedure: EGD (ESOPHAGOGASTRODUODENOSCOPY);  Surgeon: Rich Syed MD;  Location: [facility] (2ND FLR);   Service: Endoscopy;  Laterality: N/A;    ESOPHAGOGASTRODUODENOSCOPY N/A 5/28/2025    Procedure: EGD (ESOPHAGOGASTRODUODENOSCOPY);  Surgeon: Rich Syed MD;  Location: [facility] (2ND FLR);  Service: Endoscopy;  Laterality: N/A;    EXCISION, SMALL INTESTINE N/A 5/17/2024    Procedure: EXCISION, SMALL INTESTINE;  Surgeon: Ruben Oscar MD;  Location: [facility] OR 2ND FLR;  Service: General;  Laterality: N/A;    EYE SURGERY Bilateral     cataract removal and lens implants    HIP ARTHROPLASTY Left 05/28/2023    Procedure: TOTAL HIP ARTHROPLASTY;  Surgeon: Juan Wan MD;  Location: [facility] OR [facility] FLR;  Service: Orthopedics;  Laterality: Left;    LAPAROTOMY, EXPLORATORY N/A 5/17/2024    Procedure: LAPAROTOMY, EXPLORATORY;  Surgeon: Ruben Oscar MD;  Location: [facility] OR 2ND FLR;  Service: General;  Laterality: N/A;    REPAIR, HERNIA, INGUINAL Bilateral 5/17/2024    Procedure: REPAIR, HERNIA, INGUINAL;  Surgeon: Ruben Oscar MD;  [facility];  Service: General;  Laterality: Bilateral;    TONSILLECTOMY         Review of patient's allergies indicates:   Allergen Reactions    Hydromorphone Anxiety, Other (See Comments) and Hallucinations     Severe agitation       Current Facility-Administered Medications on File Prior to Encounter   Medication    mupirocin 2 % ointment    tranexamic acid (CYKLOKAPRON) 1,000 mg in sodium chloride 0.9 % 100 mL IVPB (MB+)    tranexamic acid (CYKLOKAPRON) 1,000 mg in sodium chloride 0.9 % 100 mL IVPB (MB+)     Current Outpatient Medications on File Prior to Encounter   Medication Sig    allopurinoL (ZYLOPRIM) 300 MG tablet Take 300 mg by mouth once daily.    aspirin (ECOTRIN) 81 MG EC tablet Take 1 tablet (81 mg total) by mouth once daily.    atorvastatin (LIPITOR) 80 MG tablet TAKE 1 TABLET BY MOUTH EVERY DAY (Patient taking differently: Take 80 mg by mouth once daily.)    celecoxib (CELEBREX) 200 MG capsule TAKE 1 CAPSULE BY MOUTH DAILY AS NEEDED  FOR PAIN.    colchicine (COLCRYS) 0.6 mg tablet TAKE 1 TABLET (0.6 MG TOTAL) BY MOUTH ONCE DAILY. AS NEEDED FOR GOUT    cyanocobalamin 1,000 mcg/mL injection Inject 1,000 mcg into the muscle every 7 days.    fluticasone propionate (FLONASE) 50 mcg/actuation nasal spray 2 sprays (100 mcg total) by Each Nostril route once daily.    fluticasone-umeclidin-vilanter (TRELEGY ELLIPTA) 200-62.5-25 mcg inhaler Inhale 1 puff into the lungs once daily.    furosemide (LASIX) 20 MG tablet Take 1 tablet (20 mg total) by mouth 2 (two) times daily as needed (swelling, weight gain, dyspnea).    gabapentin (NEURONTIN) 300 MG capsule Take 1 capsule (300 mg total) by mouth 3 (three) times daily.    mirtazapine (REMERON) 7.5 MG Tab Take 1 tablet (7.5 mg total) by mouth every evening.    montelukast (SINGULAIR) 10 mg tablet Take 10 mg by mouth every evening.    pantoprazole (PROTONIX) 40 MG tablet Take 1 tablet (40 mg total) by mouth 2 (two) times daily.    QUEtiapine (SEROQUEL) 50 MG tablet Take 1 tablet (50 mg total) by mouth 2 (two) times daily.    syringe, disposable, 1 mL Syrg 1 Stick by Misc.(Non-Drug; Combo Route) route once a week.    [Paused] amLODIPine (NORVASC) 5 MG tablet Take 1 tablet (5 mg total) by mouth once daily.    [Paused] carvediloL (COREG) 6.25 MG tablet Take 1 tablet (6.25 mg total) by mouth 2 (two) times daily.    [Paused] hydroCHLOROthiazide (HYDRODIURIL) 25 MG tablet Take 1 tablet (25 mg total) by mouth once daily.    LIDOcaine 4 % Gel Apply 1 g topically 2 (two) times daily as needed (foot pain).    LIDOcaine-prilocaine (EMLA) cream Apply topically as needed.     Family History       Problem Relation (Age of Onset)    Alcohol abuse Father    Aneurysm Mother    Gout Brother    Heart disease Brother    Hypertension Mother, Father    Kidney disease Brother    No Known Problems Daughter, Daughter, Daughter          Tobacco Use    Smoking status: Former     Current packs/day: 0.00     Average packs/day: 0.5  packs/day for 20.0 years (10.0 ttl pk-yrs)     Types: Cigarettes     Start date: 1979     Quit date: 1999     Years since quittin.4     Passive exposure: Past    Smokeless tobacco: Never   Vaping Use    Vaping status: Never Used   Substance and Sexual Activity    Alcohol use: Yes     Alcohol/week: 14.0 standard drinks of alcohol     Types: 14 Glasses of wine per week     Comment: 1 or 2 glasses of red wine    Drug use: No    Sexual activity: Yes     Partners: Male     Review of Systems   Constitutional:  Negative for chills and fever.   HENT:  Negative for congestion and trouble swallowing.    Eyes:  Negative for photophobia and visual disturbance.   Respiratory:  Negative for chest tightness and shortness of breath.    Cardiovascular:  Negative for chest pain, palpitations and leg swelling.   Gastrointestinal:  Negative for abdominal pain, constipation, diarrhea and nausea.   Genitourinary:  Negative for dysuria, frequency and hematuria.   Musculoskeletal:  Negative for arthralgias and myalgias.   Neurological:  Positive for syncope. Negative for dizziness, weakness and light-headedness.   Psychiatric/Behavioral:  Negative for confusion.      Objective:     Vital Signs (Most Recent):  Temp: 97.7 °F (36.5 °C) (25 1428)  Pulse: 67 (25 1500)  Resp: 20 (25 1428)  BP: (!) 116/59 (25 1500)  SpO2: 98 % (25 1500) Vital Signs (24h Range):  Temp:  [97.7 °F (36.5 °C)-98.1 °F (36.7 °C)] 97.7 °F (36.5 °C)  Pulse:  [67-89] 67  Resp:  [18-20] 20  SpO2:  [97 %-100 %] 98 %  BP: ()/(49-67) 116/59     Weight: 49.9 kg (110 lb)  Body mass index is 19.49 kg/m².     Physical Exam  Vitals and nursing note reviewed.   Constitutional:       General: She is not in acute distress.     Appearance: Normal appearance.   HENT:      Head: Normocephalic and atraumatic.      Right Ear: External ear normal.      Left Ear: External ear normal.      Nose: Nose normal.      Mouth/Throat:      Mouth:  Mucous membranes are moist.      Pharynx: Oropharynx is clear.   Eyes:      Extraocular Movements: Extraocular movements intact.      Conjunctiva/sclera: Conjunctivae normal.   Cardiovascular:      Rate and Rhythm: Normal rate and regular rhythm.      Pulses: Normal pulses.      Heart sounds: Normal heart sounds. No murmur heard.  Pulmonary:      Effort: Pulmonary effort is normal. No respiratory distress.      Breath sounds: Normal breath sounds.   Abdominal:      General: Abdomen is flat.      Palpations: Abdomen is soft.      Tenderness: There is no abdominal tenderness.   Musculoskeletal:         General: Normal range of motion.      Cervical back: Normal range of motion and neck supple.      Right lower leg: No edema.      Left lower leg: No edema.   Skin:     General: Skin is warm and dry.      Capillary Refill: Capillary refill takes less than 2 seconds.   Neurological:      General: No focal deficit present.      Mental Status: She is alert. Mental status is at baseline.   Psychiatric:         Mood and Affect: Mood normal.         Behavior: Behavior normal.                Significant Labs: All pertinent labs within the past 24 hours have been reviewed.  CBC:   Recent Labs   Lab 06/19/25  1600 06/20/25  1224   WBC 12.95* 13.39*   HGB 6.0* 6.0*   HCT 20.5* 20.5*   * 478*     CMP:   Recent Labs   Lab 06/20/25  1224   *   K 3.9   CL 96   CO2 26   GLU 97   BUN 34*   CREATININE 1.1   CALCIUM 7.8*   PROT 6.4   ALBUMIN 2.7*   BILITOT 0.5   ALKPHOS 85   AST 21   ALT 12   ANIONGAP 10       Significant Imaging: I have reviewed all pertinent imaging results/findings within the past 24 hours.

## 2025-06-20 NOTE — TELEPHONE ENCOUNTER
Not sure if anyone has reached out to her from GI but her hemoglobin is very low and dropping, I recommend going to the ER for a blood transfusion - if you wouldn't mind calling, I can try to get in touch too over lunch

## 2025-06-20 NOTE — HPI
Jessica Floyd is a 74 y.o. with pmh of COPD, HFpEF, CAD, ERIK, hx bleeding from the cecum s/p IR embolization May 2024, incarcerated inguinal hernia s/p SB resection (17 cm) May 2024 presents to the ED for abnormal lab values. Patient reports having blood work yesterday which revealed she was anemic with Hgb of 6.0. She was called earlier today about the results and was told to go to the ED for a transfusion. Patient reports due to the anxiety of having to come to the ED she had a syncopal episode. 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 She was caught by her  who lowered her softly to the ground. No associated trauma. Patient reports recent hospitalization for GI bleed but has not had any further bleeding since leaving the hospital. She reports full complaince with medications. She denies any fevers, chills, SOB, chest pain, nausea, vomiting.     In the ED: afebrile, VSS, WBC 13.39, Hgb 6.0, Na 132, Mag 1.4, T&S obtained. Given mag, 1 unit PRBC

## 2025-06-20 NOTE — ASSESSMENT & PLAN NOTE
Patient's blood pressure range in the last 24 hours was: BP  Min: 94/49  Max: 130/61.The patient's inpatient anti-hypertensive regimen is listed below:  Current Antihypertensives  hydroCHLOROthiazide tablet 25 mg, Daily, Oral    Plan  - BP is controlled, no changes needed to their regimen  - holding home antihypertensives given normotension  - mild fluid noted in BLE, will continue HCTZ for now

## 2025-06-20 NOTE — ASSESSMENT & PLAN NOTE
Patient has Abnormal Magnesium: hypomagnesemia. Will continue to monitor electrolytes closely. Will replace the affected electrolytes and repeat labs to be done after interventions completed. The patient's magnesium results have been reviewed and are listed below.  Recent Labs   Lab 06/20/25  1224   MG 1.4*        -2 mg given in ED  - monitor daily

## 2025-06-20 NOTE — PLAN OF CARE
[facility] - Emergency Dept  Initial Discharge Assessment       Primary Care Provider: Jace Valencia MD    Admission Diagnosis: Syncope [R55]    Admission Date: 6/20/2025  Expected Discharge Date:     Pt stated she uses a wheelchair and walker to assist with ambulation and is independent with her ADL's.      Pt stated she is current with home health; however d/c from PT./OT home health shortly.  Pt stated she would like to continue with home health services on d/c after this admission.      Pt to d/c home with home health when ready    Transition of Care Barriers: (P) None    Payor: [employer] [facility] / Plan: [employer] CHOICES / Product Type: Medicare Advantage /     Extended Emergency Contact Information  Primary Emergency Contact: Sandie Floyd  Address: [address] [place] [address] [facility]  Home Phone: [phone]  Work Phone: [phone]  Mobile Phone: [phone]  Relation: Spouse   needed? No  Secondary Emergency Contact: Ramila Floyd  Address: [address] [place] [address] [place]  Mobile Phone: [phone]  Relation: Daughter    Discharge Plan A: (P) Home Health, Home with family  Discharge Plan B: (P) Home Health, Home      [id number] [place], [place] - [address]  [address] [facility] [address]  Phone: [phone] Fax: [phone]    [facility] - [facility] [address] [facility]  [address] [facility] [address]  Phone: [phone] Fax: [phone]      Initial Assessment (most recent)       Adult Discharge Assessment - 06/20/25 1436          Discharge Assessment    Assessment Type Discharge Planning Assessment (P)      Confirmed/corrected address, phone number and insurance Yes (P)      Confirmed Demographics Correct on Facesheet (P)      Source of Information patient (P)      Communicated DEBBIE with patient/caregiver Yes (P)      People in Home  spouse (P)      Name(s) of People in Home spouse Natesh (P)      Facility Arrived From: home (P)      Do you expect to return to your current living situation? Yes (P)      Do you have help at home or someone to help you manage your care at home? No (P)      Prior to hospitilization cognitive status: Alert/Oriented;No Deficits (P)      Current cognitive status: No Deficits;Alert/Oriented (P)      Walking or Climbing Stairs Difficulty yes (P)      Walking or Climbing Stairs ambulation difficulty, requires equipment (P)      Mobility Management walker, wheelchair (P)      Dressing/Bathing Difficulty no (P)      Home Accessibility wheelchair accessible;stairs to enter home (P)      Number of Stairs, Main Entrance three (P)      Home Layout Able to live on 1st floor;Bathroom on 2nd floor;Bedroom on 2nd floor (P)      Equipment Currently Used at Home wheelchair;walker, standard;oxygen (P)    2L oxygen as needed    Patient currently being followed by outpatient case management? No (P)      Do you currently have service(s) that help you manage your care at home? Yes (P)      Name and Contact number of agency Home Health - unsure home health company name (P)      Is the pt/caregiver preference to resume services with current agency Yes (P)      Do you take prescription medications? Yes (P)      Do you have any problems affording any of your prescribed medications? No (P)      Is the patient taking medications as prescribed? yes (P)      Who is going to help you get home at discharge? family/friends (P)      How do you get to doctors appointments? family or friend will provide (P)      Are you on dialysis? No (P)      Do you take coumadin? No (P)      Discharge Plan A Home Health;Home with family (P)      Discharge Plan B Home Health;Home (P)      DME Needed Upon Discharge  none (P)      Discharge Plan discussed with: Patient (P)      Transition of Care Barriers None (P)         Physical Activity    On average, how many days  per week do you engage in moderate to strenuous exercise (like a brisk walk)? 0 days (P)      On average, how many minutes do you engage in exercise at this level? 0 min (P)         Financial Resource Strain    How hard is it for you to pay for the very basics like food, housing, medical care, and heating? Not very hard (P)         Housing Stability    In the last 12 months, was there a time when you were not able to pay the mortgage or rent on time? No (P)      At any time in the past 12 months, were you homeless or living in a shelter (including now)? No (P)         Transportation Needs    In the past 12 months, has lack of transportation kept you from medical appointments or from getting medications? No (P)      In the past 12 months, has lack of transportation kept you from meetings, work, or from getting things needed for daily living? No (P)         Food Insecurity    Within the past 12 months, you worried that your food would run out before you got the money to buy more. Never true (P)      Within the past 12 months, the food you bought just didn't last and you didn't have money to get more. Never true (P)         Stress    Do you feel stress - tense, restless, nervous, or anxious, or unable to sleep at night because your mind is troubled all the time - these days? Only a little (P)         Social Isolation    How often do you feel lonely or isolated from those around you?  Never (P)         Alcohol Use    Q1: How often do you have a drink containing alcohol? Never (P)      Q2: How many drinks containing alcohol do you have on a typical day when you are drinking? Patient does not drink (P)      Q3: How often do you have six or more drinks on one occasion? Never (P)         [place]els    In the past 12 months has the electric, gas, oil, or water company threatened to shut off services in your home? No (P)         Health Literacy    How often do you need to have someone help you when you read instructions,  pamphlets, or other written material from your doctor or pharmacy? Rarely (P)                    Discharge Plan A and Plan B have been determined by review of patient's clinical status, future medical and therapeutic needs, and coverage/benefits for post-acute care in coordination with multidisciplinary team members.    aPmela Worthington CD, MSW, LMSW, RSW   Case Management  [facility]  Email: [email or web address]

## 2025-06-20 NOTE — H&P
[facility] Observation [address] [place] [address] Medicine  History & Physical    Patient Name: Jessica Floyd  MRN: [id number]  Patient Class: OP- Observation  Admission Date: 6/20/2025  Attending Physician: Marvin Grigsby MD   Primary Care Provider: Jace Valencia MD         Patient information was obtained from patient and ER records.     Subjective:     Principal Problem:Symptomatic anemia    Chief Complaint:   Chief Complaint   Patient presents with    Loss of Consciousness     Pt arrives from home for syncope and low H/H per PCP. H/H was 6/20. Pt was also hypotensive pta and received 300cc fluids.         HPI: Jessica Floyd is a 74 y.o. with pmh of COPD, HFpEF, CAD, ERIK, hx bleeding from the cecum s/p IR embolization May 2024, incarcerated inguinal hernia s/p SB resection (17 cm) May 2024 presents to the ED for abnormal lab values. Patient reports having blood work yesterday which revealed she was anemic with Hgb of 6.0. She was called earlier today about the results and was told to go to the ED for a transfusion. Patient reports due to the anxiety of having to come to the ED she had a syncopal episode. 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 She was caught by her  who lowered her softly to the ground. No associated trauma. Patient reports recent hospitalization for GI bleed but has not had any further bleeding since leaving the hospital. She reports full complaince with medications. She denies any fevers, chills, SOB, chest pain, nausea, vomiting.     In the ED: afebrile, VSS, WBC 13.39, Hgb 6.0, Na 132, Mag 1.4, T&S obtained. Given mag, 1 unit PRBC    Past Medical History:   Diagnosis Date    Allergic rhinitis     Amblyopia     Carotid stenosis     Coronary atherosclerosis     Outside [facility] 6/2014: 20% mLAD, 50% mCx, 20%, mRCA    COVID-19 11/15/2023    Dyslipidemia     ESBL (extended spectrum beta-lactamase) producing bacteria infection     UTI     Hypertension     Influenza A 12/28/2023    Nausea and vomiting 05/26/2017    Pulmonary emphysema 10/28/2023    SAH (subarachnoid hemorrhage) 08/24/2016 1999, s/p clipping    Subarachnoid hemorrhage due to ruptured aneurysm 1999       Past Surgical History:   Procedure Laterality Date    ADENOIDECTOMY      ANTERIOR CRUCIATE LIGAMENT REPAIR  2012    APPENDECTOMY      CATARACT EXTRACTION W/  INTRAOCULAR LENS IMPLANT Right 11/07/2022    Procedure: EXTRACTION, CATARACT, WITH IOL INSERTION;  Surgeon: Higinio Cristina MD;  Location: [facility] OR;  Service: Ophthalmology;  Laterality: Right;    CATARACT EXTRACTION W/  INTRAOCULAR LENS IMPLANT Left 11/21/2022    Procedure: EXTRACTION, CATARACT, WITH IOL INSERTION;  Surgeon: Higinio Cristina MD;  Location: [facility] OR;  Service: Ophthalmology;  Laterality: Left;    CEREBRAL ANEURYSM REPAIR  1999    clip    COLONOSCOPY N/A 5/16/2024    Procedure: COLONOSCOPY;  Surgeon: Rich Syed MD;  Location: [facility] ENDO (2ND FLR);  Service: Endoscopy;  Laterality: N/A;    COLONOSCOPY, WITH RETROGRADE BALLOON ENTEROSCOPY, SINGLE OR DOUBLE BALLOON N/A 5/29/2025    Procedure: COLONOSCOPY, WITH RETROGRADE BALLOON ENTEROSCOPY, SINGLE OR DOUBLE BALLOON;  Surgeon: Rich Syed MD;  Location: [facility] (2ND FLR);  Service: Endoscopy;  Laterality: N/A;    DENTAL SURGERY      DIAGNOSTIC LAPAROSCOPY N/A 5/17/2024    Procedure: LAPAROSCOPY, DIAGNOSTIC;  Surgeon: Ruben Oscar MD;  Location: [facility] OR 2ND FLR;  Service: General;  Laterality: N/A;    ESOPHAGOGASTRODUODENOSCOPY N/A 5/15/2024    Procedure: EGD (ESOPHAGOGASTRODUODENOSCOPY);  Surgeon: Rich Syed MD;  Location: [facility] ENDO (2ND FLR);  Service: Endoscopy;  Laterality: N/A;    ESOPHAGOGASTRODUODENOSCOPY N/A 5/28/2025    Procedure: EGD (ESOPHAGOGASTRODUODENOSCOPY);  Surgeon: Rich Syed MD;  Location: [facility] ENDO (2ND FLR);  Service: Endoscopy;  Laterality: N/A;    EXCISION, SMALL INTESTINE N/A 5/17/2024    Procedure: EXCISION,  SMALL INTESTINE;  Surgeon: Ruben Oscar MD;  Location: [facility] OR [address] [place] [address];  Service: General;  Laterality: N/A;    EYE SURGERY Bilateral     cataract removal and lens implants    HIP ARTHROPLASTY Left 05/28/2023    Procedure: TOTAL HIP ARTHROPLASTY;  Surgeon: Juan Wan MD;  Location: [facility] OR [address] [place] [address];  Service: Orthopedics;  Laterality: Left;    LAPAROTOMY, EXPLORATORY N/A 5/17/2024    Procedure: LAPAROTOMY, EXPLORATORY;  Surgeon: Ruben Oscar MD;  [facility];  Service: General;  Laterality: N/A;    REPAIR, HERNIA, INGUINAL Bilateral 5/17/2024    Procedure: REPAIR, HERNIA, INGUINAL;  Surgeon: Ruben Oscar MD;  Location: [facility] OR [address] [place] [address];  Service: General;  Laterality: Bilateral;    TONSILLECTOMY         Review of patient's allergies indicates:   Allergen Reactions    Hydromorphone Anxiety, Other (See Comments) and Hallucinations     Severe agitation       Current Facility-Administered Medications on File Prior to Encounter   Medication    mupirocin 2 % ointment    tranexamic acid (CYKLOKAPRON) 1,000 mg in sodium chloride 0.9 % 100 mL IVPB (MB+)    tranexamic acid (CYKLOKAPRON) 1,000 mg in sodium chloride 0.9 % 100 mL IVPB (MB+)     Current Outpatient Medications on File Prior to Encounter   Medication Sig    allopurinoL (ZYLOPRIM) 300 MG tablet Take 300 mg by mouth once daily.    aspirin (ECOTRIN) 81 MG EC tablet Take 1 tablet (81 mg total) by mouth once daily.    atorvastatin (LIPITOR) 80 MG tablet TAKE 1 TABLET BY MOUTH EVERY DAY (Patient taking differently: Take 80 mg by mouth once daily.)    celecoxib (CELEBREX) 200 MG capsule TAKE 1 CAPSULE BY MOUTH DAILY AS NEEDED FOR PAIN.    colchicine (COLCRYS) 0.6 mg tablet TAKE 1 TABLET (0.6 MG TOTAL) BY MOUTH ONCE DAILY. AS NEEDED FOR GOUT    cyanocobalamin 1,000 mcg/mL injection Inject 1,000 mcg into the muscle every 7 days.    fluticasone propionate (FLONASE) 50 mcg/actuation nasal spray 2 sprays (100 mcg total) by  Each Nostril route once daily.    fluticasone-umeclidin-vilanter (TRELEGY ELLIPTA) 200-62.5-25 mcg inhaler Inhale 1 puff into the lungs once daily.    furosemide (LASIX) 20 MG tablet Take 1 tablet (20 mg total) by mouth 2 (two) times daily as needed (swelling, weight gain, dyspnea).    gabapentin (NEURONTIN) 300 MG capsule Take 1 capsule (300 mg total) by mouth 3 (three) times daily.    mirtazapine (REMERON) 7.5 MG Tab Take 1 tablet (7.5 mg total) by mouth every evening.    montelukast (SINGULAIR) 10 mg tablet Take 10 mg by mouth every evening.    pantoprazole (PROTONIX) 40 MG tablet Take 1 tablet (40 mg total) by mouth 2 (two) times daily.    QUEtiapine (SEROQUEL) 50 MG tablet Take 1 tablet (50 mg total) by mouth 2 (two) times daily.    syringe, disposable, 1 mL Syrg 1 Stick by Misc.(Non-Drug; Combo Route) route once a week.    [Paused] amLODIPine (NORVASC) 5 MG tablet Take 1 tablet (5 mg total) by mouth once daily.    [Paused] carvediloL (COREG) 6.25 MG tablet Take 1 tablet (6.25 mg total) by mouth 2 (two) times daily.    [Paused] hydroCHLOROthiazide (HYDRODIURIL) 25 MG tablet Take 1 tablet (25 mg total) by mouth once daily.    LIDOcaine 4 % Gel Apply 1 g topically 2 (two) times daily as needed (foot pain).    LIDOcaine-prilocaine (EMLA) cream Apply topically as needed.     Family History       Problem Relation (Age of Onset)    Alcohol abuse Father    Aneurysm Mother    Gout Brother    Heart disease Brother    Hypertension Mother, Father    Kidney disease Brother    No Known Problems Daughter, Daughter, Daughter          Tobacco Use    Smoking status: Former     Current packs/day: 0.00     Average packs/day: 0.5 packs/day for 20.0 years (10.0 ttl pk-yrs)     Types: Cigarettes     Start date: 1979     Quit date: 1999     Years since quittin.4     Passive exposure: Past    Smokeless tobacco: Never   Vaping Use    Vaping status: Never Used   Substance and Sexual Activity    Alcohol use: Yes      Alcohol/week: 14.0 standard drinks of alcohol     Types: 14 Glasses of wine per week     Comment: 1 or 2 glasses of red wine    Drug use: No    Sexual activity: Yes     Partners: Male     Review of Systems   Constitutional:  Negative for chills and fever.   HENT:  Negative for congestion and trouble swallowing.    Eyes:  Negative for photophobia and visual disturbance.   Respiratory:  Negative for chest tightness and shortness of breath.    Cardiovascular:  Negative for chest pain, palpitations and leg swelling.   Gastrointestinal:  Negative for abdominal pain, constipation, diarrhea and nausea.   Genitourinary:  Negative for dysuria, frequency and hematuria.   Musculoskeletal:  Negative for arthralgias and myalgias.   Neurological:  Positive for syncope. Negative for dizziness, weakness and light-headedness.   Psychiatric/Behavioral:  Negative for confusion.      Objective:     Vital Signs (Most Recent):  Temp: 97.7 °F (36.5 °C) (06/20/25 1428)  Pulse: 67 (06/20/25 1500)  Resp: 20 (06/20/25 1428)  BP: (!) 116/59 (06/20/25 1500)  SpO2: 98 % (06/20/25 1500) Vital Signs (24h Range):  Temp:  [97.7 °F (36.5 °C)-98.1 °F (36.7 °C)] 97.7 °F (36.5 °C)  Pulse:  [67-89] 67  Resp:  [18-20] 20  SpO2:  [97 %-100 %] 98 %  BP: ()/(49-67) 116/59     Weight: 49.9 kg (110 lb)  Body mass index is 19.49 kg/m².     Physical Exam  Vitals and nursing note reviewed.   Constitutional:       General: She is not in acute distress.     Appearance: Normal appearance.   HENT:      Head: Normocephalic and atraumatic.      Right Ear: External ear normal.      Left Ear: External ear normal.      Nose: Nose normal.      Mouth/Throat:      Mouth: Mucous membranes are moist.      Pharynx: Oropharynx is clear.   Eyes:      Extraocular Movements: Extraocular movements intact.      Conjunctiva/sclera: Conjunctivae normal.   Cardiovascular:      Rate and Rhythm: Normal rate and regular rhythm.      Pulses: Normal pulses.      Heart sounds: Normal  heart sounds. No murmur heard.  Pulmonary:      Effort: Pulmonary effort is normal. No respiratory distress.      Breath sounds: Normal breath sounds.   Abdominal:      General: Abdomen is flat.      Palpations: Abdomen is soft.      Tenderness: There is no abdominal tenderness.   Musculoskeletal:         General: Normal range of motion.      Cervical back: Normal range of motion and neck supple.      Right lower leg: No edema.      Left lower leg: No edema.   Skin:     General: Skin is warm and dry.      Capillary Refill: Capillary refill takes less than 2 seconds.   Neurological:      General: No focal deficit present.      Mental Status: She is alert. Mental status is at baseline.   Psychiatric:         Mood and Affect: Mood normal.         Behavior: Behavior normal.                Significant Labs: All pertinent labs within the past 24 hours have been reviewed.  CBC:   Recent Labs   Lab 06/19/25  1600 06/20/25  1224   WBC 12.95* 13.39*   HGB 6.0* 6.0*   HCT 20.5* 20.5*   * 478*     CMP:   Recent Labs   Lab 06/20/25  1224   *   K 3.9   CL 96   CO2 26   GLU 97   BUN 34*   CREATININE 1.1   CALCIUM 7.8*   PROT 6.4   ALBUMIN 2.7*   BILITOT 0.5   ALKPHOS 85   AST 21   ALT 12   ANIONGAP 10       Significant Imaging: I have reviewed all pertinent imaging results/findings within the past 24 hours.  Assessment/Plan:     Assessment & Plan  Symptomatic anemia  Iron deficiency anemia secondary to blood loss (chronic)  Anemia is likely due to acute blood loss which was from GI bleed. Most recent hemoglobin and hematocrit are listed below.  Recent Labs     06/19/25  1600 06/20/25  1224   HGB 6.0* 6.0*   HCT 20.5* 20.5*     Plan  - Monitor serial CBC: Every 6 hours  - Transfuse PRBC if patient becomes hemodynamically unstable, symptomatic or H/H drops below 7/21.  - Patient has received 1 units of PRBCs on 6/20  - Patient's anemia is currently stable  - continue to monitor  Coronary artery disease involving native  coronary artery of native heart without angina pectoris  - known hx, no acute issues  - continue statin and asa  Gastroesophageal reflux disease without esophagitis  - continue home protonix  Hypertension  Patient's blood pressure range in the last 24 hours was: BP  Min: 94/49  Max: 130/61.The patient's inpatient anti-hypertensive regimen is listed below:  Current Antihypertensives  hydroCHLOROthiazide tablet 25 mg, Daily, Oral    Plan  - BP is controlled, no changes needed to their regimen  - holding home antihypertensives given normotension  - mild fluid noted in BLE, will continue HCTZ for now  Leukocytosis  - noted on admission and outpatient blood work  - per patient she has mildly elevated WBC at baseline  - follows closely with hematology for this issue  - small slow healing wound on her left ankle which may be attributing  - wound does not appear to have surrounded cellulitis  - overall low concern for infection, no abx needed at this time    Gout  - noted hx, controlled with preventative measures  - continue allopurinol and colchicine    Anxiety  - noted hx, controlled with current home regimen  - moon appropriate for situation    (HFpEF) heart failure with preserved ejection fraction  - noted hx, no acute issues  - takes hctz daily with lasix PRN for weight gain    Hypomagnesemia  Patient has Abnormal Magnesium: hypomagnesemia. Will continue to monitor electrolytes closely. Will replace the affected electrolytes and repeat labs to be done after interventions completed. The patient's magnesium results have been reviewed and are listed below.  Recent Labs   Lab 06/20/25  1224   MG 1.4*        -2 mg given in ED  - monitor daily  VTE Risk Mitigation (From admission, onward)           Ordered     Reason for No Pharmacological VTE Prophylaxis  Once        Question:  Reasons:  Answer:  Risk of Bleeding    06/20/25 1412     IP VTE HIGH RISK PATIENT  Once         06/20/25 1412     Place sequential compression  device  Until discontinued         06/20/25 1412                    SDOH Screening:  The patient was screened for utility difficulties, food insecurity, transport difficulties, housing insecurity, and interpersonal safety and there were no concerns identified this admission.                   On 06/20/2025, patient should be placed in hospital observation services under my care in collaboration with Marvin Grigsby MD.           Merritt Alatorre PA-C  Department of Hospital Medicine  [facility] - Observation 11H

## 2025-06-20 NOTE — ED PROVIDER NOTES
Encounter Date: 6/20/2025       History     Chief Complaint   Patient presents with    Loss of Consciousness     Pt arrives from home for syncope and low H/H per PCP. H/H was 6/20. Pt was also hypotensive pta and received 300cc fluids.      74 y.o. female with history of COPD, HFpEF, CAD, ERIK, hx bleeding from the cecum s/p IR embolization May 2024, incarcerated inguinal hernia s/p SB resection (17 cm) May 2024 that presents to emergency department after passing out.  Patient states that she was getting ready to come to the emergency department as she had labs done yesterday and was told that she needed a blood transfusion.  While she is getting ready patient passed out.  Patient denies any current symptoms states that she has no headache, chest pain, abdominal pain, nausea or vomiting.  Per patient and daughter at bedside they confirmed that there was no history of melena, hematochezia, hematemesis        Review of patient's allergies indicates:   Allergen Reactions    Hydromorphone Anxiety, Other (See Comments) and Hallucinations     Severe agitation     Past Medical History:   Diagnosis Date    Allergic rhinitis     Amblyopia     Carotid stenosis     Coronary atherosclerosis     [facility]: 20% mLAD, 50% mCx, 20%, mRCA    COVID-19 11/15/2023    Dyslipidemia     ESBL (extended spectrum beta-lactamase) producing bacteria infection     UTI    Hypertension     Influenza A 12/28/2023    Nausea and vomiting 05/26/2017    Pulmonary emphysema 10/28/2023    SAH (subarachnoid hemorrhage) 08/24/2016 1999, s/p clipping    Subarachnoid hemorrhage due to ruptured aneurysm 1999     Past Surgical History:   Procedure Laterality Date    ADENOIDECTOMY      ANTERIOR CRUCIATE LIGAMENT REPAIR  2012    APPENDECTOMY      CATARACT EXTRACTION W/  INTRAOCULAR LENS IMPLANT Right 11/07/2022    Procedure: EXTRACTION, CATARACT, WITH IOL INSERTION;  Surgeon: Higinio Cristina MD;  Location: [facility];  Service: Ophthalmology;   Laterality: Right;    CATARACT EXTRACTION W/  INTRAOCULAR LENS IMPLANT Left 11/21/2022    Procedure: EXTRACTION, CATARACT, WITH IOL INSERTION;  Surgeon: Higinio Critsina MD;  Location: [facility];  Service: Ophthalmology;  Laterality: Left;    CEREBRAL ANEURYSM REPAIR  1999    clip    COLONOSCOPY N/A 5/16/2024    Procedure: COLONOSCOPY;  Surgeon: Rich Syed MD;  Location: [facility] ENDO (2ND FLR);  Service: Endoscopy;  Laterality: N/A;    COLONOSCOPY, WITH RETROGRADE BALLOON ENTEROSCOPY, SINGLE OR DOUBLE BALLOON N/A 5/29/2025    Procedure: COLONOSCOPY, WITH RETROGRADE BALLOON ENTEROSCOPY, SINGLE OR DOUBLE BALLOON;  Surgeon: Rich Syed MD;  Location: [facility] ENDO (2ND FLR);  Service: Endoscopy;  Laterality: N/A;    DENTAL SURGERY      DIAGNOSTIC LAPAROSCOPY N/A 5/17/2024    Procedure: LAPAROSCOPY, DIAGNOSTIC;  Surgeon: Ruben Oscar MD;  [address];  Service: General;  Laterality: N/A;    ESOPHAGOGASTRODUODENOSCOPY N/A 5/15/2024    Procedure: EGD (ESOPHAGOGASTRODUODENOSCOPY);  Surgeon: Rich Syed MD;  Location: [facility] (2ND FLR);  Service: Endoscopy;  Laterality: N/A;    ESOPHAGOGASTRODUODENOSCOPY N/A 5/28/2025    Procedure: EGD (ESOPHAGOGASTRODUODENOSCOPY);  Surgeon: Rich Syed MD;  Location: [facility] (2ND FLR);  Service: Endoscopy;  Laterality: N/A;    EXCISION, SMALL INTESTINE N/A 5/17/2024    Procedure: EXCISION, SMALL INTESTINE;  Surgeon: Ruben Oscar MD;  [address];  Service: General;  Laterality: N/A;    EYE SURGERY Bilateral     cataract removal and lens implants    HIP ARTHROPLASTY Left 05/28/2023    Procedure: TOTAL HIP ARTHROPLASTY;  Surgeon: Juan Wan MD;  [facility];  Service: Orthopedics;  Laterality: Left;    LAPAROTOMY, EXPLORATORY N/A 5/17/2024    Procedure: LAPAROTOMY, EXPLORATORY;  Surgeon: Ruben Oscar MD;  [facility];  Service: General;  Laterality: N/A;    REPAIR, HERNIA,  INGUINAL Bilateral 5/17/2024    Procedure: REPAIR, HERNIA, INGUINAL;  Surgeon: Ruben Oscar MD;  Location: [facility] OR [address] [place] [address];  Service: General;  Laterality: Bilateral;    TONSILLECTOMY       Family History   Problem Relation Name Age of Onset    Hypertension Mother      Aneurysm Mother      Hypertension Father      Alcohol abuse Father      Kidney disease Brother      Heart disease Brother      Gout Brother      No Known Problems Daughter Hannah     No Known Problems Daughter Aby     No Known Problems Daughter Diya      Social History[1]  Review of Systems  ROS negative except as noted in HPI    Physical Exam     Initial Vitals [06/20/25 1205]   BP Pulse Resp Temp SpO2   (!) 94/49 77 18 98.1 °F (36.7 °C) 98 %      MAP       --         Physical Exam    Nursing note and vitals reviewed.  Constitutional: She appears well-developed. No distress.   HENT:   Head: Normocephalic and atraumatic. Mouth/Throat: Oropharynx is clear and moist.   Eyes: Conjunctivae are normal.   Cardiovascular:  Normal rate, regular rhythm and normal heart sounds.           Pulmonary/Chest: Breath sounds normal. No respiratory distress.   Abdominal: Abdomen is soft. She exhibits no distension. There is no abdominal tenderness. There is no rebound and no guarding.   Musculoskeletal:         General: Edema (Trace edema) present. Normal range of motion.     Skin: Skin is warm. Capillary refill takes less than 2 seconds.   Psychiatric: She has a normal mood and affect.         ED Course   Procedures  Labs Reviewed   COMPREHENSIVE METABOLIC PANEL - Abnormal       Result Value    Sodium 132 (*)     Potassium 3.9      Chloride 96      CO2 26      Glucose 97      BUN 34 (*)     Creatinine 1.1      Calcium 7.8 (*)     Protein Total 6.4      Albumin 2.7 (*)     Bilirubin Total 0.5      ALP 85      AST 21      ALT 12      Anion Gap 10      eGFR 53 (*)    MAGNESIUM - Abnormal    Magnesium  1.4 (*)    CBC WITH DIFFERENTIAL - Abnormal    WBC 13.39  (*)     RBC 2.20 (*)     HGB 6.0 (*)     HCT 20.5 (*)     MCV 93      MCH 27.3      MCHC 29.3 (*)     RDW 19.7 (*)     Platelet Count 478 (*)     MPV 10.2      Nucleated RBC 0      Neut % 51.8      Lymph % 15.0 (*)     Mono % 24.9 (*)     Eos % 3.5      Basophil % 1.7      Imm Grans % 3.1 (*)     Neut # 6.92      Lymph # 2.01      Mono # 3.34 (*)     Eos # 0.47      Baso # 0.23 (*)     Imm Grans # 0.42 (*)    HEPATITIS C ANTIBODY - Normal    Hep C Ab Interp Non-Reactive     HIV 1 / 2 ANTIBODY - Normal    HIV 1/2 Ag/Ab Non-Reactive     CBC W/ AUTO DIFFERENTIAL    Narrative:     The following orders were created for panel order CBC auto differential.  Procedure                               Abnormality         Status                     ---------                               -----------         ------                     CBC with Differential[[id number]]       Abnormal            Final result                 Please view results for these tests on the individual orders.   HEP C VIRUS HOLD SPECIMEN    Extra Tube Hold for add-ons.     EXTRA TUBES    Narrative:     The following orders were created for panel order EXTRA TUBES.  Procedure                               Abnormality         Status                     ---------                               -----------         ------                     Light Blue Top Hold[[id number]]                             Final result                 Please view results for these tests on the individual orders.   LIGHT BLUE TOP HOLD    Extra Tube Hold for add-ons.     EXTRA TUBES    Narrative:     The following orders were created for panel order EXTRA TUBES.  Procedure                               Abnormality         Status                     ---------                               -----------         ------                     Light Green Top Hold[[id number]]                                                         Please view results for these tests on the individual orders.    LIGHT GREEN TOP HOLD   TYPE & SCREEN    Specimen Outdate 06/23/2025 23:59      Group & Rh A POS      Indirect Gerardo NEG     PREPARE RBC SOFT    UNIT NUMBER [id number]      UNIT ABO/RH A POS      DISPENSE STATUS Issued      Unit Expiration [id number]      Product Code [id number]      Unit Blood Type Code 6200      CROSSMATCH INTERPRETATION Compatible            Imaging Results              X-Ray Chest AP Portable (Final result)  Result time 06/20/25 13:54:26      Final result by Duc Jimenez III, MD (06/20/25 13:54:26)                   Impression:      No acute process seen.      Electronically signed by: Duc Jimenez MD  Date:    06/20/2025  Time:    13:54               Narrative:    EXAMINATION:  XR CHEST AP PORTABLE    CLINICAL HISTORY:  Chest Pain;    FINDINGS:  Chest one view:    Heart size is normal.  There is aortic plaque.  Lungs are clear.  There is a high-riding right shoulder suggesting chronic rotator cuff tear.                                       Medications   0.9%  NaCl infusion (for blood administration) (has no administration in time range)   sodium chloride 0.9% flush 10 mL (has no administration in time range)   naloxone 0.4 mg/mL injection 0.02 mg (has no administration in time range)   glucose chewable tablet 16 g (has no administration in time range)   glucose chewable tablet 24 g (has no administration in time range)   dextrose 50% injection 12.5 g (has no administration in time range)   dextrose 50% injection 25 g (has no administration in time range)   glucagon (human recombinant) injection 1 mg (has no administration in time range)   acetaminophen tablet 650 mg (has no administration in time range)   polyethylene glycol packet 17 g (17 g Oral Not Given 6/20/25 1515)   ondansetron disintegrating tablet 8 mg (has no administration in time range)   acetaminophen tablet 650 mg (has no administration in time range)   melatonin tablet 6 mg (has no administration in time range)    allopurinoL tablet 300 mg (has no administration in time range)   aspirin EC tablet 81 mg (has no administration in time range)   atorvastatin tablet 80 mg (has no administration in time range)   colchicine capsule/tablet 0.6 mg (has no administration in time range)   fluticasone furoate-vilanteroL 100-25 mcg/dose diskus inhaler 1 puff (has no administration in time range)   tiotropium bromide 2.5 mcg/actuation inhaler 2 puff (has no administration in time range)   gabapentin capsule 300 mg (has no administration in time range)   hydroCHLOROthiazide tablet 25 mg (has no administration in time range)   mirtazapine tablet 7.5 mg (has no administration in time range)   montelukast tablet 10 mg (has no administration in time range)   pantoprazole EC tablet 40 mg (has no administration in time range)   QUEtiapine tablet 50 mg (has no administration in time range)   magnesium sulfate 2g in water 50mL IVPB (premix) (0 g Intravenous Stopped 6/20/25 1443)     Medical Decision Making  74 y.o. female with history of COPD, HFpEF, CAD, ERIK, hx bleeding from the cecum s/p IR embolization May 2024, incarcerated inguinal hernia s/p SB resection (17 cm) May 2024 that presents to emergency department after passing out.    On initial evaluation patient's vitals were significant for mild hypotension however rapidly improving with some IV fluids provided by EMS.  Patient was alert and oriented and speaking in full sentences and cooperative with the history and physical exam.    Differential includes symptomatic anemia, GI bleed, KENDY, dehydration.    See ED course below.    Labs confirmed that patient has a hemoglobin of 6.  Given recent GI bleed patient is high-risk for decompensation of progression of anemia despite transfusion initiated in the ER.  Discussed with the patient that she would benefit from admission to trend hemoglobin and evaluate for further episodes of possible GI bleed.  She is agreeable to this plan and no other  concerns      Amount and/or Complexity of Data Reviewed  Labs: ordered. Decision-making details documented in ED Course.  Radiology: ordered.    Risk  Prescription drug management.               ED Course as of 25 1557      1335 Magnesium (!): 1.4  We will replace [TK]      ED Course User Index  [TK] Shahida Bernard DO                           Clinical Impression:  Final diagnoses:  [R55] Syncope  [N18.31] CKD stage G3a/A1, GFR 45-59 and albumin creatinine ratio <30 mg/g  [D62] Acute blood loss anemia (Primary)          ED Disposition Condition    Observation                       [1]   Social History  Tobacco Use    Smoking status: Former     Current packs/day: 0.00     Average packs/day: 0.5 packs/day for 20.0 years (10.0 ttl pk-yrs)     Types: Cigarettes     Start date: 1979     Quit date: 1999     Years since quittin.4     Passive exposure: Past    Smokeless tobacco: Never   Vaping Use    Vaping status: Never Used   Substance Use Topics    Alcohol use: Yes     Alcohol/week: 14.0 standard drinks of alcohol     Types: 14 Glasses of wine per week     Comment: 1 or 2 glasses of red wine    Drug use: No        Shahida Bernard DO  Resident  25 1557

## 2025-06-20 NOTE — ASSESSMENT & PLAN NOTE
Anemia is likely due to acute blood loss which was from GI bleed. Most recent hemoglobin and hematocrit are listed below.  Recent Labs     06/19/25  1600 06/20/25  1224   HGB 6.0* 6.0*   HCT 20.5* 20.5*     Plan  - Monitor serial CBC: Every 6 hours  - Transfuse PRBC if patient becomes hemodynamically unstable, symptomatic or H/H drops below 7/21.  - Patient has received 1 units of PRBCs on 6/20  - Patient's anemia is currently stable  - continue to monitor

## 2025-06-20 NOTE — ASSESSMENT & PLAN NOTE
- noted on admission and outpatient blood work  - per patient she has mildly elevated WBC at baseline  - follows closely with hematology for this issue  - small slow healing wound on her left ankle which may be attributing  - wound does not appear to have surrounded cellulitis  - overall low concern for infection, no abx needed at this time

## 2025-06-21 VITALS
BODY MASS INDEX: 19.58 KG/M2 | DIASTOLIC BLOOD PRESSURE: 64 MMHG | OXYGEN SATURATION: 95 % | WEIGHT: 110.5 LBS | HEIGHT: 63 IN | TEMPERATURE: 98 F | RESPIRATION RATE: 16 BRPM | SYSTOLIC BLOOD PRESSURE: 110 MMHG | HEART RATE: 83 BPM

## 2025-06-21 LAB
ABSOLUTE EOSINOPHIL (OHS): 0.4 K/UL
ABSOLUTE EOSINOPHIL (OHS): 0.44 K/UL
ABSOLUTE MONOCYTE (OHS): 3.9 K/UL (ref 0.3–1)
ABSOLUTE MONOCYTE (OHS): 5.28 K/UL (ref 0.3–1)
ABSOLUTE NEUTROPHIL COUNT (OHS): 11.89 K/UL (ref 1.8–7.7)
ABSOLUTE NEUTROPHIL COUNT (OHS): 6.14 K/UL (ref 1.8–7.7)
ALBUMIN SERPL BCP-MCNC: 2.6 G/DL (ref 3.5–5.2)
ALP SERPL-CCNC: 81 UNIT/L (ref 40–150)
ALT SERPL W/O P-5'-P-CCNC: 11 UNIT/L (ref 10–44)
ANION GAP (OHS): 9 MMOL/L (ref 8–16)
AST SERPL-CCNC: 18 UNIT/L (ref 11–45)
BASOPHILS # BLD AUTO: 0.28 K/UL
BASOPHILS # BLD AUTO: 0.42 K/UL
BASOPHILS NFR BLD AUTO: 2 %
BASOPHILS NFR BLD AUTO: 2.2 %
BILIRUB SERPL-MCNC: 0.7 MG/DL (ref 0.1–1)
BUN SERPL-MCNC: 25 MG/DL (ref 8–23)
CALCIUM SERPL-MCNC: 7.9 MG/DL (ref 8.7–10.5)
CHLORIDE SERPL-SCNC: 102 MMOL/L (ref 95–110)
CO2 SERPL-SCNC: 24 MMOL/L (ref 23–29)
CREAT SERPL-MCNC: 0.9 MG/DL (ref 0.5–1.4)
ERYTHROCYTE [DISTWIDTH] IN BLOOD BY AUTOMATED COUNT: 19.2 % (ref 11.5–14.5)
ERYTHROCYTE [DISTWIDTH] IN BLOOD BY AUTOMATED COUNT: 19.4 % (ref 11.5–14.5)
GFR SERPLBLD CREATININE-BSD FMLA CKD-EPI: >60 ML/MIN/1.73/M2
GLUCOSE SERPL-MCNC: 89 MG/DL (ref 70–110)
HCT VFR BLD AUTO: 23.5 % (ref 37–48.5)
HCT VFR BLD AUTO: 26.8 % (ref 37–48.5)
HGB BLD-MCNC: 7.1 GM/DL (ref 12–16)
HGB BLD-MCNC: 7.8 GM/DL (ref 12–16)
IMM GRANULOCYTES # BLD AUTO: 0.33 K/UL (ref 0–0.04)
IMM GRANULOCYTES # BLD AUTO: 0.71 K/UL (ref 0–0.04)
IMM GRANULOCYTES NFR BLD AUTO: 2.6 % (ref 0–0.5)
IMM GRANULOCYTES NFR BLD AUTO: 3.4 % (ref 0–0.5)
LYMPHOCYTES # BLD AUTO: 1.74 K/UL (ref 1–4.8)
LYMPHOCYTES # BLD AUTO: 2.06 K/UL (ref 1–4.8)
MAGNESIUM SERPL-MCNC: 2 MG/DL (ref 1.6–2.6)
MCH RBC QN AUTO: 27 PG (ref 27–31)
MCH RBC QN AUTO: 27.6 PG (ref 27–31)
MCHC RBC AUTO-ENTMCNC: 29.1 G/DL (ref 32–36)
MCHC RBC AUTO-ENTMCNC: 30.2 G/DL (ref 32–36)
MCV RBC AUTO: 91 FL (ref 82–98)
MCV RBC AUTO: 93 FL (ref 82–98)
NUCLEATED RBC (/100WBC) (OHS): 0 /100 WBC
NUCLEATED RBC (/100WBC) (OHS): 1 /100 WBC
PHOSPHATE SERPL-MCNC: 3.4 MG/DL (ref 2.7–4.5)
PLATELET # BLD AUTO: 400 K/UL (ref 150–450)
PLATELET # BLD AUTO: 442 K/UL (ref 150–450)
PMV BLD AUTO: 10 FL (ref 9.2–12.9)
PMV BLD AUTO: 9.8 FL (ref 9.2–12.9)
POTASSIUM SERPL-SCNC: 3.8 MMOL/L (ref 3.5–5.1)
PROT SERPL-MCNC: 6.2 GM/DL (ref 6–8.4)
RBC # BLD AUTO: 2.57 M/UL (ref 4–5.4)
RBC # BLD AUTO: 2.89 M/UL (ref 4–5.4)
RELATIVE EOSINOPHIL (OHS): 2.1 %
RELATIVE EOSINOPHIL (OHS): 3.1 %
RELATIVE LYMPHOCYTE (OHS): 13.6 % (ref 18–48)
RELATIVE LYMPHOCYTE (OHS): 9.9 % (ref 18–48)
RELATIVE MONOCYTE (OHS): 25.4 % (ref 4–15)
RELATIVE MONOCYTE (OHS): 30.5 % (ref 4–15)
RELATIVE NEUTROPHIL (OHS): 48 % (ref 38–73)
RELATIVE NEUTROPHIL (OHS): 57.2 % (ref 38–73)
SODIUM SERPL-SCNC: 135 MMOL/L (ref 136–145)
WBC # BLD AUTO: 12.79 K/UL (ref 3.9–12.7)
WBC # BLD AUTO: 20.8 K/UL (ref 3.9–12.7)

## 2025-06-21 PROCEDURE — 94640 AIRWAY INHALATION TREATMENT: CPT | Mod: NTX,XB

## 2025-06-21 PROCEDURE — 94761 N-INVAS EAR/PLS OXIMETRY MLT: CPT | Mod: NTX

## 2025-06-21 PROCEDURE — 36415 COLL VENOUS BLD VENIPUNCTURE: CPT | Mod: NTX

## 2025-06-21 PROCEDURE — 84100 ASSAY OF PHOSPHORUS: CPT | Mod: NTX

## 2025-06-21 PROCEDURE — 85025 COMPLETE CBC W/AUTO DIFF WBC: CPT | Mod: 91,NTX

## 2025-06-21 PROCEDURE — G0378 HOSPITAL OBSERVATION PER HR: HCPCS | Mod: NTX

## 2025-06-21 PROCEDURE — 82374 ASSAY BLOOD CARBON DIOXIDE: CPT | Mod: NTX

## 2025-06-21 PROCEDURE — 25000242 PHARM REV CODE 250 ALT 637 W/ HCPCS: Mod: NTX

## 2025-06-21 PROCEDURE — 99900035 HC TECH TIME PER 15 MIN (STAT): Mod: NTX

## 2025-06-21 PROCEDURE — 25000003 PHARM REV CODE 250: Mod: NTX

## 2025-06-21 PROCEDURE — 83735 ASSAY OF MAGNESIUM: CPT | Mod: NTX

## 2025-06-21 RX ADMIN — TIOTROPIUM BROMIDE INHALATION SPRAY 2 PUFF: 3.12 SPRAY, METERED RESPIRATORY (INHALATION) at 08:06

## 2025-06-21 RX ADMIN — QUETIAPINE FUMARATE 50 MG: 25 TABLET ORAL at 08:06

## 2025-06-21 RX ADMIN — FLUTICASONE FUROATE AND VILANTEROL TRIFENATATE 1 PUFF: 100; 25 POWDER RESPIRATORY (INHALATION) at 08:06

## 2025-06-21 RX ADMIN — ALLOPURINOL 300 MG: 300 TABLET ORAL at 08:06

## 2025-06-21 RX ADMIN — POLYETHYLENE GLYCOL 3350 17 G: 17 POWDER, FOR SOLUTION ORAL at 08:06

## 2025-06-21 RX ADMIN — ASPIRIN 81 MG: 81 TABLET, COATED ORAL at 08:06

## 2025-06-21 RX ADMIN — COLCHICINE 0.6 MG: 0.6 TABLET, FILM COATED ORAL at 08:06

## 2025-06-21 RX ADMIN — HYDROCHLOROTHIAZIDE 25 MG: 25 TABLET ORAL at 08:06

## 2025-06-21 RX ADMIN — ATORVASTATIN CALCIUM 80 MG: 40 TABLET, FILM COATED ORAL at 08:06

## 2025-06-21 RX ADMIN — GABAPENTIN 300 MG: 300 CAPSULE ORAL at 08:06

## 2025-06-21 RX ADMIN — PANTOPRAZOLE SODIUM 40 MG: 40 TABLET, DELAYED RELEASE ORAL at 08:06

## 2025-06-21 NOTE — PLAN OF CARE
Problem: Skin Injury Risk Increased  Goal: Skin Health and Integrity  Outcome: Progressing     Problem: Adult Inpatient Plan of Care  Goal: Plan of Care Review  Outcome: Progressing     Problem: Fall Injury Risk  Goal: Absence of Fall and Fall-Related Injury  Outcome: Progressing     Problem: Anemia  Goal: Anemia Symptom Improvement  Outcome: Progressing

## 2025-06-21 NOTE — PLAN OF CARE
[facility] - Observation 11H  Discharge Final Note    Primary Care Provider: Jace Valencia MD    Expected Discharge Date: 6/21/2025    Patient cleared for discharge from case management standpoint.    Final Discharge Note (most recent)       Final Note - 06/21/25 1055          Final Note    Assessment Type Final Discharge Note     Anticipated Discharge Disposition [place] [facility]     What phone number can be called within the next 1-3 days to see how you are doing after discharge? [phone]     Hospital Resources/Appts/Education Provided Provided patient/caregiver with written discharge plan information;Post-Acute resouces added to AVS;Appointments scheduled and added to AVS        Post-Acute Status    Post-Acute Authorization Home Health     Home Health Status Set-up Complete/Auth obtained     Discharge Delays None known at this time                    Follow-up providers       Jace Valencia MD   Specialty: Internal Medicine   Relationship: PCP - General    [facility] [address]  [facility] [address]   Phone: [phone]       Next Steps: Follow up              After-discharge care                Home Medical Care       *[employer]   Service: [facility]    [address] [place] [address]   Phone: [phone]                             Future Appointments   Date Time Provider Department Center   6/26/2025  9:15 AM PULMONARY FUNCTION [facility] PULMLAB [facility]   6/26/2025  9:30 AM PULMONARY FUNCTION [facility] PULMLAB [facility]   6/26/2025  9:45 AM PULMONARY FUNCTION [facility] PULMLAB [facility]   6/26/2025 10:00 AM SIX, MINUTE WALK [facility] PUL WLK [facility]   6/26/2025 10:30 AM Kimberly Pascual PA-C [facility] LUNGTX [facility]   6/30/2025  9:20 AM Jace Valencia MD [facility] IM [facility] PCW   7/1/2025  3:00 PM Post, Danis RUBIO MD [facility] PSYCH [facility]   7/8/2025  1:40 PM Emery Shafer OD [facility] OPTOMTY [facility]   7/22/2025 10:30 AM VASCULAR, LAB [facility] VASCLAB [facility]   7/22/2025 11:15 AM NICKOLAS Kwon  JOANN MOSQUEDA MD [facility] ISHMAEL [facility]   7/25/2025 11:30 AM LAB, BAP [facility] LABDRAW [facility]   7/28/2025 10:00 AM [facility] DEXA1 [facility] BMD [facility]   7/29/2025  9:00 AM Agata Rolon MD [facility] HEM ONC [facility]   9/16/2025 10:20 AM Michael Lal MD [facility] [id number] [facility]       Agata Lo RN  Weekend  - [facility]  Ext. [phone]

## 2025-06-21 NOTE — HOSPITAL COURSE
Patient admitted to hospital medicine for symptomatic anemia. 1 unit PRBC transfused, repeat HGB 7.1 which is around patients baseline. Patient ambulated around unit without issue. Orthosatic bps were reassuring. Patient is okay for discharge.    Pt was seen and evaluated by me this morning, reports feeling well, and is eager to discharge home. All questions were answered. Patient acknowledged understanding of discharge instructions and feels safe to discharge home. Patient was discharged on 6/21/2025 in stable condition with PCP and hematology follow-up. Education regarding condition provided and return precautions given.     Physical Exam  Gen: in NAD, appears stated age  Neuro: AAOx3, motor, sensory, and strength grossly intact BL  HEENT: EOMI, PERRLA; no JVD appreciated  CVS: RRR, no m/r/g  Resp: lungs CTAB, no w/r/r; no belabored breathing or accessory muscle use appreciated   Abd: NTND, soft to palpation  Extrem: no UE or LE edema BL

## 2025-06-21 NOTE — PLAN OF CARE
[facility]      HOME HEALTH ORDERS  FACE TO FACE ENCOUNTER    Patient Name: Jessica Floyd  YOB: 1950    PCP: Jace Valencia MD   PCP Address: [address] / [place] [address]  PCP Phone Number: [phone]  PCP Fax: [phone]    Encounter Date: 6/20/25    Admit to Home Health    Diagnoses:  Active Hospital Problems    Diagnosis  POA    *Symptomatic anemia [D64.9]  Unknown    Hypomagnesemia [E83.42]  Yes     Chronic    (HFpEF) heart failure with preserved ejection fraction [I50.30]  Yes     Chronic    Anxiety [F41.9]  Yes    Gout [M10.9]  Yes     Chronic    Leukocytosis [D72.829]  Yes    Iron deficiency anemia secondary to blood loss (chronic) [D50.0]  Yes     Chronic    Hypertension [I10]  Yes     Chronic    Gastroesophageal reflux disease without esophagitis [K21.9]  Yes     Chronic    Coronary artery disease involving native coronary artery of native heart without angina pectoris [I25.10]  Yes     Chronic     Outside [facility] 2014:  mLAD 20%  mCx 50%  mRCA 20%        Resolved Hospital Problems   No resolved problems to display.       Follow Up Appointments:  Future Appointments   Date Time Provider Department Center   6/26/2025  9:15 AM PULMONARY FUNCTION NOM PULMLAB [facility]   6/26/2025  9:30 AM PULMONARY FUNCTION NOMC PULMLAB [facility]   6/26/2025  9:45 AM PULMONARY FUNCTION [facility]OMC PULMLAB [facility]   6/26/2025 10:00 AM SIX, MINUTE WALK [facility] PUL WLK [facility]   6/26/2025 10:30 AM Kimberly Pascual PA-C [facility] LUNGTX [facility]   6/30/2025  9:20 AM Jace Valencia MD [facility] IM [facility] PCW   7/8/2025  1:40 PM Emery Shafer OD [facility] OPTOMTY [facility]   7/22/2025 10:30 AM VASCULAR, LAB NOM VASCLAB [facility]   7/22/2025 11:15 AM NICKOLAS Kwon III, MD [facility] VASCSUR [facility]   7/25/2025 11:30 AM LAB, BAP B[facility] LABDRAW [facility]   7/28/2025 10:00 AM BAPH DEXA1 [facility] BMD [facility]   7/29/2025  9:00 AM Agata Rolon MD [facility] HEM ONC [facility]   9/16/2025 10:20 AM Michael Lal  MD [facility] [id number] [facility]       Allergies:  Review of patient's allergies indicates:   Allergen Reactions    Hydromorphone Anxiety, Other (See Comments) and Hallucinations     Severe agitation       Medications: Review discharge medications with patient and family and provide education.    Current Medications[1]     Medication List        PAUSE taking these medications      amLODIPine 5 MG tablet  Wait to take this until your doctor or other care provider tells you to start again.  Commonly known as: NORVASC  Take 1 tablet (5 mg total) by mouth once daily.     carvediloL 6.25 MG tablet  Wait to take this until your doctor or other care provider tells you to start again.  Commonly known as: COREG  Take 1 tablet (6.25 mg total) by mouth 2 (two) times daily.            START taking these medications      hydroCHLOROthiazide 25 MG tablet  Commonly known as: HYDRODIURIL  Take 1 tablet (25 mg total) by mouth once daily.            CHANGE how you take these medications      atorvastatin 80 MG tablet  Commonly known as: LIPITOR  TAKE 1 TABLET BY MOUTH EVERY DAY  What changed: when to take this            CONTINUE taking these medications      allopurinoL 300 MG tablet  Commonly known as: ZYLOPRIM  Take 300 mg by mouth once daily.     aspirin 81 MG EC tablet  Commonly known as: ECOTRIN  Take 1 tablet (81 mg total) by mouth once daily.     celecoxib 200 MG capsule  Commonly known as: CeleBREX  TAKE 1 CAPSULE BY MOUTH DAILY AS NEEDED FOR PAIN.     colchicine 0.6 mg tablet  Commonly known as: COLCRYS  TAKE 1 TABLET (0.6 MG TOTAL) BY MOUTH ONCE DAILY. AS NEEDED FOR GOUT     cyanocobalamin 1,000 mcg/mL injection  Inject 1,000 mcg into the muscle every 7 days.     fluticasone propionate 50 mcg/actuation nasal spray  Commonly known as: FLONASE  2 sprays (100 mcg total) by Each Nostril route once daily.     fluticasone-umeclidin-vilanter 200-62.5-25 mcg inhaler  Commonly known as: TRELEGY ELLIPTA  Inhale 1 puff into the lungs  once daily.     furosemide 20 MG tablet  Commonly known as: LASIX  Take 1 tablet (20 mg total) by mouth 2 (two) times daily as needed (swelling, weight gain, dyspnea).     gabapentin 300 MG capsule  Commonly known as: NEURONTIN  Take 1 capsule (300 mg total) by mouth 3 (three) times daily.     LIDOcaine 4 % Gel  Apply 1 g topically 2 (two) times daily as needed (foot pain).     LIDOcaine-prilocaine cream  Commonly known as: EMLA  Apply topically as needed.     mirtazapine 7.5 MG Tab  Commonly known as: REMERON  Take 1 tablet (7.5 mg total) by mouth every evening.     montelukast 10 mg tablet  Commonly known as: SINGULAIR  Take 10 mg by mouth every evening.     pantoprazole 40 MG tablet  Commonly known as: PROTONIX  Take 1 tablet (40 mg total) by mouth 2 (two) times daily.     QUEtiapine 50 MG tablet  Commonly known as: SEROQUEL  Take 1 tablet (50 mg total) by mouth 2 (two) times daily.     syringe (disposable) 1 mL Syrg  1 Stick by Misc.(Non-Drug; Combo Route) route once a week.                I have seen and examined this patient within the last 30 days. My clinical findings that support the need for the home health skilled services and home bound status are the following:no   Weakness/numbness causing balance and gait disturbance due to Weakness/Debility making it taxing to leave home.     Diet:   cardiac diet    Labs:  N/a    Referrals/ Consults  Physical Therapy to evaluate and treat. Evaluate for home safety and equipment needs; Establish/upgrade home exercise program. Perform / instruct on therapeutic exercises, gait training, transfer training, and Range of Motion.  Occupational Therapy to evaluate and treat. Evaluate home environment for safety and equipment needs. Perform/Instruct on transfers, ADL training, ROM, and therapeutic exercises.    Activities:   activity as tolerated    Nursing:   Agency to admit patient within 24 hours of hospital discharge unless specified on physician order or at patient  request    SN to complete comprehensive assessment including routine vital signs. Instruct on disease process and s/s of complications to report to MD. Review/verify medication list sent home with the patient at time of discharge  and instruct patient/caregiver as needed. Frequency may be adjusted depending on start of care date.     Skilled nurse to perform up to 3 visits PRN for symptoms related to diagnosis    Notify MD if SBP > 160 or < 90; DBP > 90 or < 50; HR > 120 or < 50; Temp > 101; O2 < 88%    Ok to schedule additional visits based on staff availability and patient request on consecutive days within the home health episode.    When multiple disciplines ordered:    Start of Care occurs on Sunday - Wednesday schedule remaining discipline evaluations as ordered on separate consecutive days following the start of care.    Thursday SOC -schedule subsequent evaluations Friday and Monday the following week.     Friday - Saturday SOC - schedule subsequent discipline evaluations on consecutive days starting Monday of the following week.    For all post-discharge communication and subsequent orders please contact patient's primary care physician.     Miscellaneous       Home Health Aide:  Physical Therapy Three times weekly, and Occupational Therapy Three times weekly    Wound Care Orders  no    I certify that this patient is confined to her home and needs physical therapy and occupational therapy.    Merritt Alatorre PA-C   [facility]           [1]   Current Facility-Administered Medications   Medication Dose Route Frequency Provider Last Rate Last Admin    0.9%  NaCl infusion (for blood administration)   Intravenous Q24H PRN Shahida Bernard,         acetaminophen tablet 650 mg  650 mg Oral Q4H PRN Merritt Alatorre, PA-C        acetaminophen tablet 650 mg  650 mg Oral Q8H PRN Merritt Alatorre, PA-C        allopurinoL tablet 300 mg  300 mg Oral Daily Merritt Alatorre PA-C   300 mg at 06/21/25 0857    aspirin EC  tablet 81 mg  81 mg Oral Daily Landry, Merritt P, PA-C   81 mg at 06/21/25 0857    atorvastatin tablet 80 mg  80 mg Oral Daily Landry, Merritt P, PA-C   80 mg at 06/21/25 0856    colchicine capsule/tablet 0.6 mg  0.6 mg Oral Daily Landry, Merritt P, PA-C   0.6 mg at 06/21/25 0856    dextrose 50% injection 12.5 g  12.5 g Intravenous PRN Landry, Merritt P, PA-C        dextrose 50% injection 25 g  25 g Intravenous PRN Landry, Merritt P, PA-C        fluticasone furoate-vilanteroL 100-25 mcg/dose diskus inhaler 1 puff  1 puff Inhalation Daily Landry, Merritt P, PA-C   1 puff at 06/21/25 0821    gabapentin capsule 300 mg  300 mg Oral TID Landry, Merritt P, PA-C   300 mg at 06/21/25 0856    glucagon (human recombinant) injection 1 mg  1 mg Intramuscular PRN Landry, Merritt P, PA-C        glucose chewable tablet 16 g  16 g Oral PRN Landry, Merritt P, PA-C        glucose chewable tablet 24 g  24 g Oral PRN Landry, Merritt P, PA-C        hydroCHLOROthiazide tablet 25 mg  25 mg Oral Daily Landry, Merritt P, PA-C   25 mg at 06/21/25 0857    melatonin tablet 6 mg  6 mg Oral Nightly PRN Landry, Merritt P, PA-C        mirtazapine tablet 7.5 mg  7.5 mg Oral QHS Landry, Merritt P, PA-C   7.5 mg at 06/20/25 2143    montelukast tablet 10 mg  10 mg Oral QHS Landry, Merritt P, PA-C   10 mg at 06/20/25 2143    naloxone 0.4 mg/mL injection 0.02 mg  0.02 mg Intravenous PRN Landry, Merritt P, PA-C        ondansetron disintegrating tablet 8 mg  8 mg Oral Q8H PRN Landry, Merritt P, PA-C        pantoprazole EC tablet 40 mg  40 mg Oral TID WM Landry, Merritt P, PA-C   40 mg at 06/21/25 0845    polyethylene glycol packet 17 g  17 g Oral Daily Landry, Merritt P, PA-C   17 g at 06/21/25 0856    QUEtiapine tablet 50 mg  50 mg Oral BID Merritt Alatorre PA-C   50 mg at 06/21/25 0856    sodium chloride 0.9% flush 10 mL  10 mL Intravenous Q12H PRN Merritt Alatorre PA-C        tiotropium bromide 2.5 mcg/actuation inhaler 2 puff  2 puff Inhalation Daily Merritt Alatorre PA-C   2 puff at 06/21/25 0821      Facility-Administered Medications Ordered in Other Encounters   Medication Dose Route Frequency Provider Last Rate Last Admin    mupirocin 2 % ointment   Nasal On Call Procedure Kang Diaz MD   Given at 10/25/23 1045    tranexamic acid (CYKLOKAPRON) 1,000 mg in sodium chloride 0.9 % 100 mL IVPB (MB+)  1,000 mg Intravenous Once Kang Diaz MD        tranexamic acid (CYKLOKAPRON) 1,000 mg in sodium chloride 0.9 % 100 mL IVPB (MB+)  1,000 mg Intravenous Once Kang Diaz MD

## 2025-06-21 NOTE — ASSESSMENT & PLAN NOTE
Patient's blood pressure range in the last 24 hours was: BP  Min: 104/54  Max: 149/72.The patient's inpatient anti-hypertensive regimen is listed below:  Current Antihypertensives  hydroCHLOROthiazide tablet 25 mg, Daily, Oral    Plan  - BP is controlled, no changes needed to their regimen  - holding home antihypertensives given normotension  - mild fluid noted in BLE, will continue HCTZ for now

## 2025-06-21 NOTE — PLAN OF CARE
Hospital follow-up appointment was scheduled by CHW.   Patient follow-up appointment was scheduled for 6/30/25 at 9:20 am at [facility].        Simon IZQUIERDO, RSW  Case Management

## 2025-06-21 NOTE — DISCHARGE SUMMARY
[facility] [address] [place] [address] Medicine  Discharge Summary      Patient Name: Jessica Floyd  MRN: [id number]  LENORA: [id number]  Patient Class: OP- Observation  Admission Date: 6/20/2025  Hospital Length of Stay: 0 days  Discharge Date and Time: No discharge date for patient encounter.  Attending Physician: Marvin Grigsby MD   Discharging Provider: Merritt Alatorre PA-C  Primary Care Provider: Jace Valencia MD  [facility] Medicine Team: [facility]  Merritt Alatorre PA-C  Primary Care Team: [facility]    HPI:   Jessica Floyd is a 74 y.o. with pmh of COPD, HFpEF, CAD, ERIK, hx bleeding from the cecum s/p IR embolization May 2024, incarcerated inguinal hernia s/p SB resection (17 cm) May 2024 presents to the ED for abnormal lab values. Patient reports having blood work yesterday which revealed she was anemic with Hgb of 6.0. She was called earlier today about the results and was told to go to the ED for a transfusion. Patient reports due to the anxiety of having to come to the ED she had a syncopal episode. 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 She was caught by her  who lowered her softly to the ground. No associated trauma. Patient reports recent hospitalization for GI bleed but has not had any further bleeding since leaving the hospital. She reports full complaince with medications. She denies any fevers, chills, SOB, chest pain, nausea, vomiting.     In the ED: afebrile, VSS, WBC 13.39, Hgb 6.0, Na 132, Mag 1.4, T&S obtained. Given mag, 1 unit PRBC    * No surgery found *      Hospital Course:   Patient admitted to hospital medicine for symptomatic anemia. 1 unit PRBC transfused, repeat HGB 7.1 which is around patients baseline. Patient ambulated around unit without issue. Orthosatic bps were reassuring. Patient is okay for discharge.    Pt was seen and evaluated by me this morning, reports feeling well, and is eager to discharge home. All  questions were answered. Patient acknowledged understanding of discharge instructions and feels safe to discharge home. Patient was discharged on 6/21/2025 in stable condition with PCP and hematology follow-up. Education regarding condition provided and return precautions given.     Physical Exam  Gen: in NAD, appears stated age  Neuro: AAOx3, motor, sensory, and strength grossly intact BL  HEENT: EOMI, PERRLA; no JVD appreciated  CVS: RRR, no m/r/g  Resp: lungs CTAB, no w/r/r; no belabored breathing or accessory muscle use appreciated   Abd: NTND, soft to palpation  Extrem: no UE or LE edema BL       Goals of Care Treatment Preferences:  Code Status: Full Code          What is most important right now is to focus on spending time at home, avoiding the hospital, remaining as independent as possible, symptom/pain control, quality of life, even if it means sacrificing a little time.  Accordingly, we have decided that the best plan to meet the patient's goals includes continuing with treatment, allowing for additional time for goals of care discussions.         Consults:   Consults (From admission, onward)          Status Ordering Provider     Inpatient consult to Registered Dietitian/Nutritionist  Once        Provider:  (Not yet assigned)    Acknowledged CHIQUIS MIRLEES            Assessment & Plan  Symptomatic anemia  Iron deficiency anemia secondary to blood loss (chronic)  Anemia is likely due to acute blood loss which was from GI bleed. Most recent hemoglobin and hematocrit are listed below.  Recent Labs     06/20/25  2023 06/21/25  0352 06/21/25  0957   HGB 7.0* 7.1* 7.8*   HCT 24.1* 23.5* 26.8*     Plan  - Monitor serial CBC: Every 6 hours  - Transfuse PRBC if patient becomes hemodynamically unstable, symptomatic or H/H drops below 7/21.  - Patient has received 1 units of PRBCs on 6/20  - Patient's anemia is currently stable  - continue to monitor  Coronary artery disease involving native coronary artery of native  heart without angina pectoris  - known hx, no acute issues  - continue statin and asa  Gastroesophageal reflux disease without esophagitis  - continue home protonix  Hypertension  Patient's blood pressure range in the last 24 hours was: BP  Min: 104/54  Max: 149/72.The patient's inpatient anti-hypertensive regimen is listed below:  Current Antihypertensives  hydroCHLOROthiazide tablet 25 mg, Daily, Oral    Plan  - BP is controlled, no changes needed to their regimen  - holding home antihypertensives given normotension  - mild fluid noted in BLE, will continue HCTZ for now  Leukocytosis  - noted on admission and outpatient blood work  - per patient she has mildly elevated WBC at baseline  - follows closely with hematology for this issue  - small slow healing wound on her left ankle which may be attributing  - wound does not appear to have surrounded cellulitis  - overall low concern for infection, no abx needed at this time    Gout  - noted hx, controlled with preventative measures  - continue allopurinol and colchicine    Anxiety  - noted hx, controlled with current home regimen  - moon appropriate for situation    (HFpEF) heart failure with preserved ejection fraction  - noted hx, no acute issues  - takes hctz daily with lasix PRN for weight gain    Hypomagnesemia  Patient has Abnormal Magnesium: hypomagnesemia. Will continue to monitor electrolytes closely. Will replace the affected electrolytes and repeat labs to be done after interventions completed. The patient's magnesium results have been reviewed and are listed below.  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    Specialty: Internal Medicine  Contact information:  [address]  [place] [address]  [phone]                       Contact information for after-discharge care       [facility]       [facility]    Service: Home Health Services  Contact information:  [address] [address]  [phone]                                 Patient Instructions:      Ambulatory referral/consult to Hematology / Oncology   Standing Status: Future   Referral Priority: Routine Referral Type: Consultation   Referral Reason: Specialty Services Required   Requested Specialty: Hematology and Oncology   Number of Visits Requested: 1       Significant Diagnostic Studies: N/A    Pending Diagnostic Studies:       Procedure Component Value Units Date/Time    CBC auto differential [[id number]]     Order Status: Sent Lab Status: No result     Specimen: Blood     Narrative:      The following orders were created for panel order CBC auto differential.  Procedure                               Abnormality         Status                     ---------                               -----------         ------                     CBC with Differential[[id number]]                                                        Please view results for these tests on the  individual orders.    CBC with Differential [[id number]]     Order Status: Sent Lab Status: No result     Specimen: Blood     EXTRA TUBES [[id number]]     Order Status: Sent Lab Status: No result     Specimen: Blood, Venous     Narrative:      The following orders were created for panel order EXTRA TUBES.  Procedure                               Abnormality         Status                     ---------                               -----------         ------                     Light Green Top Hold[[id number]]                                                         Please view results for these tests on the individual orders.    Light Green Top Hold [[id number]]     Order Status: Sent Lab Status: No result     Specimen: Blood, Venous            Medications:  Reconciled Home Medications:      Medication List        PAUSE taking these medications      amLODIPine 5 MG tablet  Wait to take this until your doctor or other care provider tells you to start again.  Commonly known as: NORVASC  Take 1 tablet (5 mg total) by mouth once daily.     carvediloL 6.25 MG tablet  Wait to take this until your doctor or other care provider tells you to start again.  Commonly known as: COREG  Take 1 tablet (6.25 mg total) by mouth 2 (two) times daily.            START taking these medications      hydroCHLOROthiazide 25 MG tablet  Commonly known as: HYDRODIURIL  Take 1 tablet (25 mg total) by mouth once daily.            CHANGE how you take these medications      atorvastatin 80 MG tablet  Commonly known as: LIPITOR  TAKE 1 TABLET BY MOUTH EVERY DAY  What changed: when to take this            CONTINUE taking these medications      allopurinoL 300 MG tablet  Commonly known as: ZYLOPRIM  Take 300 mg by mouth once daily.     aspirin 81 MG EC tablet  Commonly known as: ECOTRIN  Take 1 tablet (81 mg total) by mouth once daily.     celecoxib 200 MG capsule  Commonly known as: CeleBREX  TAKE 1 CAPSULE BY MOUTH DAILY AS NEEDED FOR PAIN.      colchicine 0.6 mg tablet  Commonly known as: COLCRYS  TAKE 1 TABLET (0.6 MG TOTAL) BY MOUTH ONCE DAILY. AS NEEDED FOR GOUT     cyanocobalamin 1,000 mcg/mL injection  Inject 1,000 mcg into the muscle every 7 days.     fluticasone propionate 50 mcg/actuation nasal spray  Commonly known as: FLONASE  2 sprays (100 mcg total) by Each Nostril route once daily.     fluticasone-umeclidin-vilanter 200-62.5-25 mcg inhaler  Commonly known as: TRELEGY ELLIPTA  Inhale 1 puff into the lungs once daily.     furosemide 20 MG tablet  Commonly known as: LASIX  Take 1 tablet (20 mg total) by mouth 2 (two) times daily as needed (swelling, weight gain, dyspnea).     gabapentin 300 MG capsule  Commonly known as: NEURONTIN  Take 1 capsule (300 mg total) by mouth 3 (three) times daily.     LIDOcaine 4 % Gel  Apply 1 g topically 2 (two) times daily as needed (foot pain).     LIDOcaine-prilocaine cream  Commonly known as: EMLA  Apply topically as needed.     mirtazapine 7.5 MG Tab  Commonly known as: REMERON  Take 1 tablet (7.5 mg total) by mouth every evening.     montelukast 10 mg tablet  Commonly known as: SINGULAIR  Take 10 mg by mouth every evening.     pantoprazole 40 MG tablet  Commonly known as: PROTONIX  Take 1 tablet (40 mg total) by mouth 2 (two) times daily.     QUEtiapine 50 MG tablet  Commonly known as: SEROQUEL  Take 1 tablet (50 mg total) by mouth 2 (two) times daily.     syringe (disposable) 1 mL Syrg  1 Stick by Misc.(Non-Drug; Combo Route) route once a week.              Indwelling Lines/Drains at time of discharge:   Lines/Drains/Airways       Drain  Duration             Female External Urinary Catheter w/ Suction 06/20/25 1925 <1 day                        Time spent on the discharge of patient: 45 minutes         Merritt Alatorre PA-C  Department of Hospital Medicine  [facility]

## 2025-06-21 NOTE — ASSESSMENT & PLAN NOTE
- known hx, no acute issues  - continue statin and asa   [phone])   ?   Gayle Guan RN    Subjective/Objective:      Jesus Casper, Artemio Anderson MD [facility] Internal Medicine [phone]

## 2025-06-21 NOTE — NURSING
Pt is discharged and AVS is given. Pt is educated on medications and follow up appointments. Pt IV and tele monitors removed. Pt  transport pt via wheelchair with pt belongings.

## 2025-06-21 NOTE — ASSESSMENT & PLAN NOTE
Anemia is likely due to acute blood loss which was from GI bleed. Most recent hemoglobin and hematocrit are listed below.  Recent Labs     06/20/25  2023 06/21/25  0352 06/21/25  0957   HGB 7.0* 7.1* 7.8*   HCT 24.1* 23.5* 26.8*     Plan  - Monitor serial CBC: Every 6 hours  - Transfuse PRBC if patient becomes hemodynamically unstable, symptomatic or H/H drops below 7/21.  - Patient has received 1 units of PRBCs on 6/20  - Patient's anemia is currently stable  - continue to monitor

## 2025-06-21 NOTE — PLAN OF CARE
Update - 11:10 AM  Ambulatory referral for hematology added, arranged by CHW.    Update - 10:50 AM  Patient's daughter at bedside to provide ride home.    Update - 10:25 AM  Uploaded HH orders and sent to [facility] via Epic. Completed epic HH referral.      CM noted discharge order and reviewed chart/handoff to confirm plan. Disposition is home health. Reviewed [facility] referrals, patient accepted by [facility]. Follow-up appointment arranged by CHW.     Agata Lo RN  [facility]  - [facility] [place] jonathan  Ext. [phone]

## 2025-06-21 NOTE — ASSESSMENT & PLAN NOTE
Patient has Abnormal Magnesium: hypomagnesemia. Will continue to monitor electrolytes closely. Will replace the affected electrolytes and repeat labs to be done after interventions completed. The patient's magnesium results have been reviewed and are listed below.  Recent Labs   Lab 06/21/25  0352   MG 2.0        -2 mg given in ED  - monitor daily

## 2025-06-22 ENCOUNTER — RESULTS FOLLOW-UP (OUTPATIENT)
Dept: GASTROENTEROLOGY | Facility: HOSPITAL | Age: 75
End: 2025-06-22

## 2025-06-22 ENCOUNTER — TELEPHONE (OUTPATIENT)
Dept: GASTROENTEROLOGY | Facility: HOSPITAL | Age: 75
End: 2025-06-22
Payer: MEDICARE

## 2025-06-22 RX ORDER — GABAPENTIN 300 MG/1
300 CAPSULE ORAL 3 TIMES DAILY
Qty: 90 CAPSULE | Refills: 0 | Status: CANCELLED | OUTPATIENT
Start: 2025-06-22 | End: 2025-07-22

## 2025-06-22 RX ORDER — EPINEPHRINE 0.3 MG/.3ML
0.3 INJECTION SUBCUTANEOUS ONCE AS NEEDED
OUTPATIENT
Start: 2025-06-22

## 2025-06-22 RX ORDER — SODIUM CHLORIDE 0.9 % (FLUSH) 0.9 %
10 SYRINGE (ML) INJECTION
OUTPATIENT
Start: 2025-06-22

## 2025-06-22 RX ORDER — HEPARIN 100 UNIT/ML
500 SYRINGE INTRAVENOUS
OUTPATIENT
Start: 2025-06-22

## 2025-06-22 NOTE — TELEPHONE ENCOUNTER
Called and discussed and reviewed labs    She d/c from hospital   Transfused    Does not see any bleeding  Iron low    Will see if we can arrange iron infusions    Agata, can you set her up for more IV iron?

## 2025-06-23 ENCOUNTER — PATIENT MESSAGE (OUTPATIENT)
Dept: TRANSPLANT | Facility: CLINIC | Age: 75
End: 2025-06-23
Payer: MEDICARE

## 2025-06-23 DIAGNOSIS — I50.30 HEART FAILURE WITH PRESERVED EJECTION FRACTION, UNSPECIFIED HF CHRONICITY: Chronic | ICD-10-CM

## 2025-06-23 RX ORDER — FUROSEMIDE 20 MG/1
20 TABLET ORAL 2 TIMES DAILY PRN
Qty: 180 TABLET | Refills: 1 | Status: SHIPPED | OUTPATIENT
Start: 2025-06-23 | End: 2025-09-21

## 2025-06-23 NOTE — TELEPHONE ENCOUNTER
Care Due:                  Date            Visit Type   Department     Provider  --------------------------------------------------------------------------------                                [facility] INTERNAL  Last Visit: 06-      FOLLOW UP    MEDICINE       [facility] INTERNAL  Next Visit: 06-      FOLLOW UP    MEDICINE       Jace Valencia                                                            Last  Test          Frequency    Reason                     Performed    Due Date  --------------------------------------------------------------------------------    Lipid Panel.  12 months..  atorvastatin.............  04- 03-    [facility] Embedded Care Due Messages. Reference number: [id number].   6/23/2025 8:31:47 AM CDT

## 2025-06-24 ENCOUNTER — PATIENT OUTREACH (OUTPATIENT)
Dept: ADMINISTRATIVE | Facility: CLINIC | Age: 75
End: 2025-06-24
Payer: MEDICARE

## 2025-06-24 ENCOUNTER — TELEPHONE (OUTPATIENT)
Dept: HEMATOLOGY/ONCOLOGY | Facility: CLINIC | Age: 75
End: 2025-06-24
Payer: MEDICARE

## 2025-06-24 ENCOUNTER — PATIENT MESSAGE (OUTPATIENT)
Dept: INFUSION THERAPY | Facility: OTHER | Age: 75
End: 2025-06-24
Payer: MEDICARE

## 2025-06-24 NOTE — PROGRESS NOTES
C3 nurse spoke with Jessica Floyd  for a TCC post hospital discharge follow up call. The patient has a scheduled \[facility] [place] [facility]\"" appointment with Jace Valencia MD on 6/30/2025 at 9:20 AM.

## 2025-06-24 NOTE — TELEPHONE ENCOUNTER
----- Message from BARRY Norwood sent at 6/24/2025  1:28 PM CDT -----  Regarding: RE: Iron infusions  I got her on the phone but she had to  the other line and the call dropped  Tried calling her again but no answer.  I offered her today since we had a cancellation but not able to come  I was able to add her on Tues 7/1    I will send her a message through portal  Thanks!  ----- Message -----  From: Taylor Sanders  Sent: 6/24/2025  11:27 AM CDT  To: [facility]  Subject: Iron infusions                                   Good morning,    Patient has been approved for some IV iron (feraheme) whenever asap per Dr. Rolon if possible please. Appreciate it.    ThanksTaylor

## 2025-06-25 ENCOUNTER — TELEPHONE (OUTPATIENT)
Dept: INTERNAL MEDICINE | Facility: CLINIC | Age: 75
End: 2025-06-25
Payer: MEDICARE

## 2025-06-25 NOTE — TELEPHONE ENCOUNTER
Copied from CRM #[id number]. Topic: General Inquiry - Patient Advice  >> Jun 25, 2025 11:07 AM Mona wrote:  .1MEDICALADVICE     Patient is calling for Medical Advice regarding:Zahra Nuñez with [facility] [phone] calling to extended the home physical therapy.  Please call her back and advise.    How long has patient had these symptoms:    Pharmacy name and phone#:    Patient wants a call back or thru [facility], provide patient's call back phone number:[phone] Juliane    Comments:    Please advise patient replies from provider may take up to 48 hours.

## 2025-06-26 DIAGNOSIS — K21.9 GASTROESOPHAGEAL REFLUX DISEASE WITHOUT ESOPHAGITIS: ICD-10-CM

## 2025-06-26 DIAGNOSIS — I10 PRIMARY HYPERTENSION: Primary | Chronic | ICD-10-CM

## 2025-06-26 DIAGNOSIS — R06.02 SOB (SHORTNESS OF BREATH) ON EXERTION: ICD-10-CM

## 2025-06-26 RX ORDER — PANTOPRAZOLE SODIUM 40 MG/1
40 TABLET, DELAYED RELEASE ORAL 2 TIMES DAILY
Qty: 180 TABLET | Refills: 3 | Status: SHIPPED | OUTPATIENT
Start: 2025-06-26

## 2025-06-26 RX ORDER — HYDROCHLOROTHIAZIDE 25 MG/1
25 TABLET ORAL DAILY
Qty: 90 TABLET | Refills: 3 | Status: SHIPPED | OUTPATIENT
Start: 2025-06-26 | End: 2026-06-26

## 2025-06-26 RX ORDER — HYDROCHLOROTHIAZIDE 12.5 MG/1
12.5 CAPSULE ORAL
Qty: 90 CAPSULE | Refills: 0 | Status: SHIPPED | OUTPATIENT
Start: 2025-06-26 | End: 2025-06-26

## 2025-06-26 RX ORDER — HYDROCHLOROTHIAZIDE 25 MG/1
25 TABLET ORAL
Qty: 90 TABLET | Refills: 3 | OUTPATIENT
Start: 2025-06-26

## 2025-06-26 RX ORDER — TIOTROPIUM BROMIDE INHALATION SPRAY 3.12 UG/1
SPRAY, METERED RESPIRATORY (INHALATION)
Qty: 4 G | Refills: 2 | Status: SHIPPED | OUTPATIENT
Start: 2025-06-26

## 2025-06-26 NOTE — TELEPHONE ENCOUNTER
Spoke with Zahra at [facility] to let them know that  gave the ok to extend Ms. Floyd's PT.

## 2025-06-26 NOTE — TELEPHONE ENCOUNTER
No care due was identified.  [facility] Embedded Care Due Messages. Reference number: [id number].   6/26/2025 9:14:04 AM CDT

## 2025-06-30 ENCOUNTER — OFFICE VISIT (OUTPATIENT)
Dept: INTERNAL MEDICINE | Facility: CLINIC | Age: 75
End: 2025-06-30
Payer: MEDICARE

## 2025-06-30 VITALS
HEIGHT: 63 IN | HEART RATE: 76 BPM | SYSTOLIC BLOOD PRESSURE: 120 MMHG | DIASTOLIC BLOOD PRESSURE: 66 MMHG | BODY MASS INDEX: 19.58 KG/M2 | OXYGEN SATURATION: 96 %

## 2025-06-30 DIAGNOSIS — M25.512 CHRONIC LEFT SHOULDER PAIN: ICD-10-CM

## 2025-06-30 DIAGNOSIS — Z78.9 IMPAIRED MOBILITY AND ADLS: ICD-10-CM

## 2025-06-30 DIAGNOSIS — S91.302A NON HEALING LEFT HEEL WOUND: ICD-10-CM

## 2025-06-30 DIAGNOSIS — Z09 HOSPITAL DISCHARGE FOLLOW-UP: Primary | ICD-10-CM

## 2025-06-30 DIAGNOSIS — D62 ACUTE BLOOD LOSS ANEMIA: ICD-10-CM

## 2025-06-30 DIAGNOSIS — G89.29 CHRONIC LEFT SHOULDER PAIN: ICD-10-CM

## 2025-06-30 DIAGNOSIS — Z74.09 IMPAIRED MOBILITY AND ADLS: ICD-10-CM

## 2025-06-30 PROBLEM — L98.429: Status: RESOLVED | Noted: 2024-07-09 | Resolved: 2025-06-30

## 2025-06-30 PROCEDURE — 3288F FALL RISK ASSESSMENT DOCD: CPT | Mod: CPTII,S$GLB,, | Performed by: INTERNAL MEDICINE

## 2025-06-30 PROCEDURE — 3078F DIAST BP <80 MM HG: CPT | Mod: CPTII,S$GLB,, | Performed by: INTERNAL MEDICINE

## 2025-06-30 PROCEDURE — 1160F RVW MEDS BY RX/DR IN RCRD: CPT | Mod: CPTII,S$GLB,, | Performed by: INTERNAL MEDICINE

## 2025-06-30 PROCEDURE — 1159F MED LIST DOCD IN RCRD: CPT | Mod: CPTII,S$GLB,, | Performed by: INTERNAL MEDICINE

## 2025-06-30 PROCEDURE — 1111F DSCHRG MED/CURRENT MED MERGE: CPT | Mod: CPTII,S$GLB,, | Performed by: INTERNAL MEDICINE

## 2025-06-30 PROCEDURE — 3074F SYST BP LT 130 MM HG: CPT | Mod: CPTII,S$GLB,, | Performed by: INTERNAL MEDICINE

## 2025-06-30 PROCEDURE — 99214 OFFICE O/P EST MOD 30 MIN: CPT | Mod: S$GLB,,, | Performed by: INTERNAL MEDICINE

## 2025-06-30 PROCEDURE — 99999 PR PBB SHADOW E&M-EST. PATIENT-LVL IV: CPT | Mod: PBBFAC,,, | Performed by: INTERNAL MEDICINE

## 2025-06-30 PROCEDURE — 1125F AMNT PAIN NOTED PAIN PRSNT: CPT | Mod: CPTII,S$GLB,, | Performed by: INTERNAL MEDICINE

## 2025-06-30 PROCEDURE — 1101F PT FALLS ASSESS-DOCD LE1/YR: CPT | Mod: CPTII,S$GLB,, | Performed by: INTERNAL MEDICINE

## 2025-06-30 RX ORDER — TRAMADOL HYDROCHLORIDE 50 MG/1
50 TABLET, FILM COATED ORAL EVERY 8 HOURS PRN
Qty: 30 TABLET | Refills: 0 | Status: SHIPPED | OUTPATIENT
Start: 2025-06-30

## 2025-06-30 NOTE — PROGRESS NOTES
Subjective:      History of Present Illness    CHIEF COMPLAINT:  Jessica presents for follow-up after a recent hospitalization for blood transfusion, with concerns about ongoing leg pain, wound healing, and fatigue.    HPI:  Jessica was recently hospitalized for approximately 24 hours to receive a blood transfusion due to low hemoglobin levels (below 7).  GI bleed was felt to be due to NSAID induced gastropathy.  She has discontinued her celecoxib since hospitalization.  Since discharge, she reports severe pain, particularly in the left ankle where there is an open wound, and in the shoulder. The pain has been constant and severe enough to significantly disrupt sleep; she has not slept properly in about 2 weeks. She describes severe pain when rolling onto the left ankle in bed.    She has increased fatigue and lethargy since hospitalization in May, frequently falling asleep while sitting up in a chair during the day due to exhaustion. She has episodes of very low blood pressure (as low as 60/40) about once a week, typically occurring between 9:45 and 11:30 in the morning, approximately 45 minutes to an hour after taking morning medications. These episodes sometimes result in loss of consciousness.    A wound on the left ankle started as a small blister from shoes but has developed into a pressure ulcer. This wound is not healing well and is causing significant swelling in the left leg. She is receiving home nursing care for wound management, with a wound care specialist visiting once a week.    She reports shoulder pain, possibly due to a torn rotator cuff, though this has not been confirmed. She was previously taking Celebrex for pain management, but this was discontinued due to a GI bleed. She is currently taking Tylenol 2-3 times daily for pain relief but finds it provides only minimal relief.    She has an appointment with Dr. Kilpatrick, a vascular surgeon, in 3 weeks to discuss potential interventions for  "revascularization of the leg. She is scheduled to start iron infusions tomorrow and has been told to continue taking hydrochlorothiazide by the most recent hospital doctor to help with the ankle swelling.    She denies any bleeding since the hospital stay.    Patient was accompanied today by her 2 daughters.    MEDICAL HISTORY:  Jessica has a history of syncope, which historically occurred once a year but is now occurring once a week. She has a pressure ulcer on her left ankle. Jessica also has a history of gout.      ROS:  General: +fatigue, +body aches, +difficulty staying asleep, +difficulty falling asleep, +sleep disturbances  Cardiovascular: +lower extremity edema, +orthostatic symptoms  Musculoskeletal: +joint pain, +muscle pain, +limb pain, +pain with movement  Neurological: +syncope  Psychiatric: +sleep difficulty           Past medical history, surgical history, and family medical history reviewed and updated as appropriate.    Medications and allergies reviewed.     Objective:          Vitals:    06/30/25 0924   BP: 120/66   BP Location: Right arm   Patient Position: Sitting   Pulse: 76   SpO2: 96%   Height: 5' 2.99" (1.6 m)     Body mass index is 19.58 kg/m².  Physical Exam  Constitutional:       Appearance: She is well-developed.      Comments: Chronically ill-appearing.    Thin.  In wheelchair.   HENT:      Head: Normocephalic and atraumatic.   Eyes:      Extraocular Movements: Extraocular movements intact.   Cardiovascular:      Rate and Rhythm: Normal rate and regular rhythm.      Heart sounds: Normal heart sounds.   Pulmonary:      Effort: Pulmonary effort is normal. No respiratory distress.      Breath sounds: Normal breath sounds. No wheezing.   Abdominal:      General: There is no distension.      Palpations: Abdomen is soft.      Tenderness: There is no abdominal tenderness.   Musculoskeletal:         General: No tenderness. Normal range of motion.      Cervical back: Normal range of motion. "   Skin:     General: Skin is warm and dry.      Comments: Left lateral ankle ulcer.  Dressed.  Minimal drainage.   Decreased pedal pulse.   Neurological:      Mental Status: She is alert and oriented to person, place, and time.      Cranial Nerves: No cranial nerve deficit.      Deep Tendon Reflexes: Reflexes are normal and symmetric.         Lab Results   Component Value Date    WBC 20.80 (H) 06/21/2025    HGB 7.8 (L) 06/21/2025    HCT 26.8 (L) 06/21/2025     06/21/2025    CHOL 176 04/05/2022    TRIG 88 04/05/2022    HDL 69 04/05/2022    ALT 11 06/21/2025    AST 18 06/21/2025     (L) 06/21/2025    K 3.8 06/21/2025     06/21/2025    CREATININE 0.9 06/21/2025    BUN 25 (H) 06/21/2025    CO2 24 06/21/2025    TSH 0.719 10/28/2023    INR 1.0 07/04/2024    HGBA1C 5.2 11/18/2023       Assessment:       1. Hospital discharge follow-up    2. Acute blood loss anemia    3. Non healing left heel wound    4. Impaired mobility and ADLs    5. Chronic left shoulder pain          Plan:     Jessica was seen today for hospital follow up.    Diagnoses and all orders for this visit:    Hospital discharge follow-up    Acute blood loss anemia  Comments:  Secondary to GI bleed.  Transfused 1 unit PRBC.  Pantoprazole 40 mg twice a day.    Non healing left heel wound  -     Ambulatory referral/consult to Wound Clinic; Future  -     HME - OTHER    Impaired mobility and ADLs  -     HME - OTHER    Chronic left shoulder pain  -     traMADoL (ULTRAM) 50 mg tablet; Take 1 tablet (50 mg total) by mouth every 8 (eight) hours as needed for Pain (breakthrough pain).    Long discussion today with patient and patient's daughters.  Awaiting 's returned from [place].  Would like to try tramadol for pain control.  Discussed elevating leg to help with swelling.  Right lower extremity without lower extremity edema.  Discussed not using diuretic for the leg swelling as this is causing her hypotension.  Patient to monitor blood pressure  carefully.  Patient will try minimal weight-bearing possibly using a knee scooter.  Has follow up scheduled with vascular surgery.  Short interval follow up in 1 month.      Follow up in about 4 weeks (around 7/28/2025).    Jace Valencia MD  Internal Medicine / Primary Care  [facility]  6/30/2025    This note was generated with the assistance of ambient listening technology. Verbal consent was obtained by the patient and accompanying visitor(s) for the recording of patient appointment to facilitate this note. I attest to having reviewed and edited the generated note for accuracy, though some syntax or spelling errors may persist. Please contact the author of this note for any clarification.

## 2025-06-30 NOTE — PATIENT INSTRUCTIONS
Referral placed to wound care.   Iron transfusion as scheduled tomorrow.  Labwork as scheduled.     Tramadol as needed for break through pain.   Elevation of leg. Order placed for knee scooter, will see if the medical equipment company has this available.     Stop hydrochlorothiazide. Restart half dose if needed for high blood pressure.     Return to clinic in 4 weeks.    no

## 2025-07-01 ENCOUNTER — OFFICE VISIT (OUTPATIENT)
Dept: PSYCHIATRY | Facility: CLINIC | Age: 75
End: 2025-07-01
Payer: MEDICARE

## 2025-07-01 VITALS
BODY MASS INDEX: 19.57 KG/M2 | WEIGHT: 110.44 LBS | SYSTOLIC BLOOD PRESSURE: 116 MMHG | DIASTOLIC BLOOD PRESSURE: 74 MMHG | HEART RATE: 97 BPM

## 2025-07-01 DIAGNOSIS — I50.30 HEART FAILURE WITH PRESERVED EJECTION FRACTION, UNSPECIFIED HF CHRONICITY: Chronic | ICD-10-CM

## 2025-07-01 DIAGNOSIS — F41.9 ANXIETY: Primary | ICD-10-CM

## 2025-07-01 DIAGNOSIS — I65.23 BILATERAL CAROTID ARTERY STENOSIS: ICD-10-CM

## 2025-07-01 DIAGNOSIS — J43.9 PULMONARY EMPHYSEMA, UNSPECIFIED EMPHYSEMA TYPE: ICD-10-CM

## 2025-07-01 DIAGNOSIS — N18.31 CKD STAGE G3A/A1, GFR 45-59 AND ALBUMIN CREATININE RATIO <30 MG/G: ICD-10-CM

## 2025-07-01 DIAGNOSIS — R55 SITUATIONAL SYNCOPE: ICD-10-CM

## 2025-07-01 DIAGNOSIS — E05.90 SUBCLINICAL HYPERTHYROIDISM: ICD-10-CM

## 2025-07-01 DIAGNOSIS — F17.211 NICOTINE DEPENDENCE, CIGARETTES, IN REMISSION: ICD-10-CM

## 2025-07-01 PROCEDURE — 99999 PR PBB SHADOW E&M-EST. PATIENT-LVL II: CPT | Mod: PBBFAC,TXP,, | Performed by: PSYCHIATRY & NEUROLOGY

## 2025-07-01 RX ORDER — QUETIAPINE FUMARATE 50 MG/1
50 TABLET, FILM COATED ORAL 2 TIMES DAILY
Qty: 180 TABLET | Refills: 1 | Status: SHIPPED | OUTPATIENT
Start: 2025-07-01 | End: 2025-12-28

## 2025-07-01 RX ORDER — MIRTAZAPINE 7.5 MG/1
7.5 TABLET, FILM COATED ORAL NIGHTLY
Qty: 90 TABLET | Refills: 1 | Status: SHIPPED | OUTPATIENT
Start: 2025-07-01 | End: 2025-12-28

## 2025-07-01 NOTE — PROGRESS NOTES
"  Jessica Floyd   1950 07/04/2025     Disclaimer: Evaluation and treatment is based on information presented to date. Any new information may affect assessment and findings.     The patient location is: In Clinic      in wheelchair / tho does walk some at home    S: Patient's Own Perception of Condition (& Side Effects) : had recent hosp admission due to GI bleed believed secondary to NSAID    O:      CURRENT PRESENTATION:     Relays was upset being told "advacned lung disease" tho has been told emphysema for some time.    I did read note from MD that states should remain same if remain off smoking     remeron 7.5 mg was added for depression and as ancillary aspect to help here (poosible with some wt gain / as she was struggling to keep wt on); and she happy to report has not been losing weight / maintaining    Informs her  he is going to go to [place] for few weeks where his family is from; says she wants him to get a break from having care for her and enjoy his family in [place]    Excerpt 11-13-24 note of Hina Roberts, DO of [facility]:     Jessica Floyd is a 74 y.o. female who is on  oxygen.  She is on no assisted ventilation.  Her [place] Heart Association Class is II and a Karnofsky score of 90% - Able to carry on normal activity: minor symptoms of disease. She is not diabetic.     Here today for routine follow-up.  Since her last visit, she has been doing fair.  Currently she feels poor due to allergies and post nasal gtt.  She takes antihistamines and flonase.  No reflux.  Takes Trelegy.  No prn albuterol.  Remains as active as she can be.  Her biggest complaint continues to be around her weight loss.  Her weight is currently stable but states over the last year, she has lost a significant amount of weight.  Follows with GI and hematology.  Continues to drink protein shakes.  Anxiety somewhat controlled.             Excerpt 2-28-25 note of note of Hina Roberts, DO of " "[facility]:     Pt presents for follow-up of her COPD.  She has COPD with PH on TTE.  She was hospitalized in December for acute on chronic hypoxic RF 2/2 influenza.  She recovered and now feels back to her baseline.  She takes her inhalers as directed.  Has an albuterol prn inhaler but does not need to use it.  Remains active.  Does not have hypercapnea or require NIPPV.  She would like testing to see how much O2 she needs as she is planning a trip and does not have a portable concentrator to travel with.  She also endorses panic attacks and anxiety/PTSD over medical traumas in the past.  Has been trying to establish with psych without any luck.  Overall, feels at her baseline.       Plan    Followed for COPD.  On Trelegy.  Tolerating well.  Is at baseline following multiple hospitalizations for GI issues.  TTE with ePAP of 40mmHg likely related to hyperinflation from emphysema.  Qualified for oxygen today and discussed re-prescribing oxygen which she is agreeable to.    Requires 2L oxygen with exertion on walk test today.  TTE with ePAP of 40mmHg.      Continue with allergy regimen.         Has anxiety at baseline and feels like she has her own maneuvers to help with calming.  She was upset over using terms "advanced lung disease" today in relation to why it is difficult to gain weight.  Explained that this may be the first time she has heard the term but that she has had severe emphysema for as long as I have seen her.  Explained that with ongoing smoking cessation, her lung disease is likely to stay stable.      Continue with protein supplementation for weight loss.  Explained that given her multiple hospital admissions and severe emphysema, she has reasons to have weight loss.  Her daughter is concerned about a malabsorption process and possible malignancy.  I will reach out to her GI and hematologist just to relay concerns.        Follow-up in 3 months with PFTs and 6MWT.        >>    Goal " directed    Calm    No SI  no HI    No Psychosis    Mood: ok     Affect:   shows range    Psyc  Medication:     Seroquel 50 mg at bed #90 x1    Referred in to [facility] on Seroquel  /noted off label use THO she is adamant that such is WORKING / largely eliminated spells she was having; denies lightereded / falls; risk benefit discussed and told read package insert and IF any questions to ask Psyc MD /     Constitutional Health Concerns: had GI adhesions tho cleared    Vitals:    07/01/25 1510   BP: 116/74   Pulse: 97        Wt Readings from Last 3 Encounters:   07/01/25 50.1 kg (110 lb 7.2 oz)   06/20/25 50.1 kg (110 lb 8 oz)   06/10/25 50 kg (110 lb 3.7 oz)      Laboratory Data  Admission on 06/20/2025, Discharged on 06/21/2025   Component Date Value Ref Range Status    QRS Duration 06/20/2025 80  ms Final    [facility] QTC Calculation 06/20/2025 464  ms Final    Sodium 06/20/2025 132 (L)  136 - 145 mmol/L Final    Potassium 06/20/2025 3.9  3.5 - 5.1 mmol/L Final    Chloride 06/20/2025 96  95 - 110 mmol/L Final    CO2 06/20/2025 26  23 - 29 mmol/L Final    Glucose 06/20/2025 97  70 - 110 mg/dL Final    BUN 06/20/2025 34 (H)  8 - 23 mg/dL Final    Creatinine 06/20/2025 1.1  0.5 - 1.4 mg/dL Final    Calcium 06/20/2025 7.8 (L)  8.7 - 10.5 mg/dL Final    Protein Total 06/20/2025 6.4  6.0 - 8.4 gm/dL Final    Albumin 06/20/2025 2.7 (L)  3.5 - 5.2 g/dL Final    Bilirubin Total 06/20/2025 0.5  0.1 - 1.0 mg/dL Final    ALP 06/20/2025 85  40 - 150 unit/L Final    AST 06/20/2025 21  11 - 45 unit/L Final    ALT 06/20/2025 12  10 - 44 unit/L Final    Anion Gap 06/20/2025 10  8 - 16 mmol/L Final    eGFR 06/20/2025 53 (L)  >60 mL/min/1.73/m2 Final    Magnesium  06/20/2025 1.4 (L)  1.6 - 2.6 mg/dL Final    Specimen Outdate 06/20/2025 06/23/2025 23:59   Final    Group & Rh 06/20/2025 A POS   Final    Indirect Gerardo 06/20/2025 NEG   Final    WBC 06/20/2025 13.39 (H)  3.90 - 12.70 K/uL Final    RBC 06/20/2025 2.20 (L)  4.00 - 5.40 M/uL  Final    HGB 06/20/2025 6.0 (L)  12.0 - 16.0 gm/dL Final    HCT 06/20/2025 20.5 (L)  37.0 - 48.5 % Final    MCV 06/20/2025 93  82 - 98 fL Final    MCH 06/20/2025 27.3  27.0 - 31.0 pg Final    MCHC 06/20/2025 29.3 (L)  32.0 - 36.0 g/dL Final    RDW 06/20/2025 19.7 (H)  11.5 - 14.5 % Final    Platelet Count 06/20/2025 478 (H)  150 - 450 K/uL Final    MPV 06/20/2025 10.2  9.2 - 12.9 fL Final    Nucleated RBC 06/20/2025 0  <=0 /100 WBC Final    Neut % 06/20/2025 51.8  38 - 73 % Final    Lymph % 06/20/2025 15.0 (L)  18 - 48 % Final    Mono % 06/20/2025 24.9 (H)  4 - 15 % Final    Eos % 06/20/2025 3.5  <=8 % Final    Basophil % 06/20/2025 1.7  <=1.9 % Final    Imm Grans % 06/20/2025 3.1 (H)  0.0 - 0.5 % Final    Neut # 06/20/2025 6.92  1.8 - 7.7 K/uL Final    Lymph # 06/20/2025 2.01  1 - 4.8 K/uL Final    Mono # 06/20/2025 3.34 (H)  0.3 - 1 K/uL Final    Eos # 06/20/2025 0.47  <=0.5 K/uL Final    Baso # 06/20/2025 0.23 (H)  <=0.2 K/uL Final    Imm Grans # 06/20/2025 0.42 (H)  0.00 - 0.04 K/uL Final    Hep C Ab Interp 06/20/2025 Non-Reactive  Non-Reactive Final    Extra Tube 06/20/2025 Hold for add-ons.   Final    HIV 1/2 Ag/Ab 06/20/2025 Non-Reactive  Non-Reactive Final    UNIT NUMBER 06/20/2025 [id number]   Final    UNIT ABO/RH 06/20/2025 A POS   Final    DISPENSE STATUS 06/20/2025 Transfused   Final    Unit Expiration 06/20/2025 [id number]   Final    Product Code 06/20/2025 [id number]   Final    Unit Blood Type Code 06/20/2025 6200   Final    CROSSMATCH INTERPRETATION 06/20/2025 Compatible   Final    Extra Tube 06/20/2025 Hold for add-ons.   Final    WBC 06/20/2025 12.86 (H)  3.90 - 12.70 K/uL Final    RBC 06/20/2025 2.61 (L)  4.00 - 5.40 M/uL Final    HGB 06/20/2025 7.0 (L)  12.0 - 16.0 gm/dL Final    HCT 06/20/2025 24.1 (L)  37.0 - 48.5 % Final    MCV 06/20/2025 92  82 - 98 fL Final    MCH 06/20/2025 26.8 (L)  27.0 - 31.0 pg Final    MCHC 06/20/2025 29.0 (L)  32.0 - 36.0 g/dL Final    RDW 06/20/2025 18.6 (H)  11.5  - 14.5 % Final    Platelet Count 06/20/2025 437  150 - 450 K/uL Final    MPV 06/20/2025 10.0  9.2 - 12.9 fL Final    Nucleated RBC 06/20/2025 1 (H)  <=0 /100 WBC Final    Neut % 06/20/2025 48.5  38 - 73 % Final    Lymph % 06/20/2025 13.6 (L)  18 - 48 % Final    Mono % 06/20/2025 30.0 (H)  4 - 15 % Final    Eos % 06/20/2025 2.9  <=8 % Final    Basophil % 06/20/2025 2.3 (H)  <=1.9 % Final    Imm Grans % 06/20/2025 2.7 (H)  0.0 - 0.5 % Final    Neut # 06/20/2025 6.23  1.8 - 7.7 K/uL Final    Lymph # 06/20/2025 1.75  1 - 4.8 K/uL Final    Mono # 06/20/2025 3.86 (H)  0.3 - 1 K/uL Final    Eos # 06/20/2025 0.37  <=0.5 K/uL Final    Baso # 06/20/2025 0.30 (H)  <=0.2 K/uL Final    Imm Grans # 06/20/2025 0.35 (H)  0.00 - 0.04 K/uL Final    Platelet Estimate 06/20/2025 Appears Normal    Final    Anisocytosis 06/20/2025 Moderate   Final    Poik 06/20/2025 Slight   Final    Polychromasia 06/20/2025 Occasional   Final    Hypochromia 06/20/2025 Occasional   Final    Ovalocytes 06/20/2025 Occasional   Final    Spherocyte 06/20/2025 Occasional   Final    Sodium 06/21/2025 135 (L)  136 - 145 mmol/L Final    Potassium 06/21/2025 3.8  3.5 - 5.1 mmol/L Final    Chloride 06/21/2025 102  95 - 110 mmol/L Final    CO2 06/21/2025 24  23 - 29 mmol/L Final    Glucose 06/21/2025 89  70 - 110 mg/dL Final    BUN 06/21/2025 25 (H)  8 - 23 mg/dL Final    Creatinine 06/21/2025 0.9  0.5 - 1.4 mg/dL Final    Calcium 06/21/2025 7.9 (L)  8.7 - 10.5 mg/dL Final    Protein Total 06/21/2025 6.2  6.0 - 8.4 gm/dL Final    Albumin 06/21/2025 2.6 (L)  3.5 - 5.2 g/dL Final    Bilirubin Total 06/21/2025 0.7  0.1 - 1.0 mg/dL Final    ALP 06/21/2025 81  40 - 150 unit/L Final    AST 06/21/2025 18  11 - 45 unit/L Final    ALT 06/21/2025 11  10 - 44 unit/L Final    Anion Gap 06/21/2025 9  8 - 16 mmol/L Final    eGFR 06/21/2025 >60  >60 mL/min/1.73/m2 Final    Magnesium  06/21/2025 2.0  1.6 - 2.6 mg/dL Final    Phosphorus Level 06/21/2025 3.4  2.7 - 4.5 mg/dL Final     WBC 06/21/2025 12.79 (H)  3.90 - 12.70 K/uL Final    RBC 06/21/2025 2.57 (L)  4.00 - 5.40 M/uL Final    HGB 06/21/2025 7.1 (L)  12.0 - 16.0 gm/dL Final    HCT 06/21/2025 23.5 (L)  37.0 - 48.5 % Final    MCV 06/21/2025 91  82 - 98 fL Final    MCH 06/21/2025 27.6  27.0 - 31.0 pg Final    MCHC 06/21/2025 30.2 (L)  32.0 - 36.0 g/dL Final    RDW 06/21/2025 19.2 (H)  11.5 - 14.5 % Final    Platelet Count 06/21/2025 400  150 - 450 K/uL Final    MPV 06/21/2025 9.8  9.2 - 12.9 fL Final    Nucleated RBC 06/21/2025 1 (H)  <=0 /100 WBC Final    Neut % 06/21/2025 48.0  38 - 73 % Final    Lymph % 06/21/2025 13.6 (L)  18 - 48 % Final    Mono % 06/21/2025 30.5 (H)  4 - 15 % Final    Eos % 06/21/2025 3.1  <=8 % Final    Basophil % 06/21/2025 2.2 (H)  <=1.9 % Final    Imm Grans % 06/21/2025 2.6 (H)  0.0 - 0.5 % Final    Neut # 06/21/2025 6.14  1.8 - 7.7 K/uL Final    Lymph # 06/21/2025 1.74  1 - 4.8 K/uL Final    Mono # 06/21/2025 3.90 (H)  0.3 - 1 K/uL Final    Eos # 06/21/2025 0.40  <=0.5 K/uL Final    Baso # 06/21/2025 0.28 (H)  <=0.2 K/uL Final    Imm Grans # 06/21/2025 0.33 (H)  0.00 - 0.04 K/uL Final    WBC 06/21/2025 20.80 (H)  3.90 - 12.70 K/uL Final    RBC 06/21/2025 2.89 (L)  4.00 - 5.40 M/uL Final    HGB 06/21/2025 7.8 (L)  12.0 - 16.0 gm/dL Final    HCT 06/21/2025 26.8 (L)  37.0 - 48.5 % Final    MCV 06/21/2025 93  82 - 98 fL Final    MCH 06/21/2025 27.0  27.0 - 31.0 pg Final    MCHC 06/21/2025 29.1 (L)  32.0 - 36.0 g/dL Final    RDW 06/21/2025 19.4 (H)  11.5 - 14.5 % Final    Platelet Count 06/21/2025 442  150 - 450 K/uL Final    MPV 06/21/2025 10.0  9.2 - 12.9 fL Final    Nucleated RBC 06/21/2025 0  <=0 /100 WBC Final    Neut % 06/21/2025 57.2  38 - 73 % Final    Lymph % 06/21/2025 9.9 (L)  18 - 48 % Final    Mono % 06/21/2025 25.4 (H)  4 - 15 % Final    Eos % 06/21/2025 2.1  <=8 % Final    Basophil % 06/21/2025 2.0 (H)  <=1.9 % Final    Imm Grans % 06/21/2025 3.4 (H)  0.0 - 0.5 % Final    Neut # 06/21/2025 11.89 (H)  " 1.8 - 7.7 K/uL Final    Lymph # 06/21/2025 2.06  1 - 4.8 K/uL Final    Mono # 06/21/2025 5.28 (H)  0.3 - 1 K/uL Final    Eos # 06/21/2025 0.44  <=0.5 K/uL Final    Baso # 06/21/2025 0.42 (H)  <=0.2 K/uL Final    Imm Grans # 06/21/2025 0.71 (H)  0.00 - 0.04 K/uL Final   Lab Visit on 06/19/2025   Component Date Value Ref Range Status    Ferritin 06/19/2025 29.0  20.0 - 300.0 ng/mL Final    Iron Level 06/19/2025 12 (L)  30 - 160 ug/dL Final    Transferrin 06/19/2025 294  200 - 375 mg/dL Final    Iron Binding Capacity Total 06/19/2025 435  250 - 450 ug/dL Final    Iron Saturation 06/19/2025 3 (L)  20 - 50 % Final    WBC 06/19/2025 12.95 (H)  3.90 - 12.70 K/uL Final    RBC 06/19/2025 2.14 (L)  4.00 - 5.40 M/uL Final    HGB 06/19/2025 6.0 (L)  12.0 - 16.0 gm/dL Final    HCT 06/19/2025 20.5 (L)  37.0 - 48.5 % Final    MCV 06/19/2025 96  82 - 98 fL Final    MCH 06/19/2025 28.0  27.0 - 31.0 pg Final    MCHC 06/19/2025 29.3 (L)  32.0 - 36.0 g/dL Final    RDW 06/19/2025 19.5 (H)  11.5 - 14.5 % Final    Platelet Count 06/19/2025 483 (H)  150 - 450 K/uL Final    MPV 06/19/2025 10.1  9.2 - 12.9 fL Final    Nucleated RBC 06/19/2025 0  <=0 /100 WBC Final    Neut % 06/19/2025 48.4  38 - 73 % Final    Lymph % 06/19/2025 14.6 (L)  18 - 48 % Final    Mono % 06/19/2025 28.5 (H)  4 - 15 % Final    Eos % 06/19/2025 3.6  <=8 % Final    Basophil % 06/19/2025 2.2 (H)  <=1.9 % Final    Imm Grans % 06/19/2025 2.7 (H)  0.0 - 0.5 % Final    Neut # 06/19/2025 6.27  1.8 - 7.7 K/uL Final    Lymph # 06/19/2025 1.89  1 - 4.8 K/uL Final    Mono # 06/19/2025 3.69 (H)  0.3 - 1 K/uL Final    Eos # 06/19/2025 0.46  <=0.5 K/uL Final    Baso # 06/19/2025 0.29 (H)  <=0.2 K/uL Final    Imm Grans # 06/19/2025 0.35 (H)  0.00 - 0.04 K/uL Final      Mental Status Exam:   Appearance: casual / in wheelchair / walks around house ok   Oriented: x 3   Attitude: cooperative engaging   Eye Contact: good   Behavior: sits calm in wheel chair   Mood: says ok"  Cognition: " alert  Concentration: grossly intact   Affect: appropriate range   Anxiety: moderate  Thought Process: goal directed   Speech: Volume : WNL   Quantity WNL   Quality: appears to openly answer questions   Eye Contact: good   Threats: no SI / no HI   Memory: intact (as relay information across time spans)  Psychosis: denies all   Estimate of Intellectual Function: upper average   Impulse Control: no history SI / nor HI ; calm here   Musculoskeletal:      Pain Level: R shoulder 9 of 10  / gets  injection about q 3 months / L leg ankle 12 of 10 soon to get injection /      Social:  x 56yrs Sandie (country [place] decent) / \She has previously spoke about being  56 yrs /   ([place]ie) is from country of [place]; pt recounts how they went to [place] to get  because he was 18 years old; she was 17; says back then  in [place] ; a man had to be 20 yr old    Patient Active Problem List   Diagnosis    Coronary artery disease involving native coronary artery of native heart without angina pectoris    Bilateral carotid artery stenosis    Gastroesophageal reflux disease without esophagitis    CKD stage G3a/A1, GFR 45-59 and albumin creatinine ratio <30 mg/g    Hypokalemia    Hypertension    Subclinical hyperthyroidism    Allergic rhinitis    Iron deficiency anemia secondary to blood loss (chronic)    History of fracture of left hip    Leukocytosis    Acute blood loss anemia    Malnutrition of mild degree    Pathological fracture due to osteoporosis    Osteoporosis    Localized osteoporosis of [place]    Impaired mobility    Reduced vision-Left eye    Gout    Right rotator cuff tear arthropathy    SOB (shortness of breath)    Eosinophilia    Anxiety    PAC (premature atrial contraction)    Aortic atherosclerosis    Pulmonary emphysema    Weight loss    Moderate malnutrition    (HFpEF) heart failure with preserved ejection fraction    History Situational (ie Stress Induced) syncope ([facility] admission  12-25-23    Nicotine dependence, cigarettes, in FULL REMISSION:in past smoked for 17yrs from 16y to about 34y / LUPE:F pack a day    Acute GI bleeding    Ischemia, bowel    Hypernatremia    Incarcerated inguinal hernia    Left leg pain    Abrasion    Comfort measures only status    Localized swelling of left lower extremity    Thrombocytosis    Monocytosis    Hypomagnesemia    Body mass index (BMI) 19.9 or less, adult    Acute cystitis without hematuria    Aortoiliac occlusive disease    PAD (peripheral artery disease)    Palliative care encounter    Symptomatic anemia    Non healing left heel wound          Current Outpatient Medications:     allopurinoL (ZYLOPRIM) 300 MG tablet, Take 300 mg by mouth once daily., Disp: , Rfl:     aspirin (ECOTRIN) 81 MG EC tablet, Take 1 tablet (81 mg total) by mouth once daily., Disp: 30 tablet, Rfl: 11    atorvastatin (LIPITOR) 80 MG tablet, TAKE 1 TABLET BY MOUTH EVERY DAY, Disp: 90 tablet, Rfl: 3    [Paused] carvediloL (COREG) 6.25 MG tablet, Take 1 tablet (6.25 mg total) by mouth 2 (two) times daily. (Patient not taking: Reported on 6/24/2025), Disp: 180 tablet, Rfl: 3    colchicine (COLCRYS) 0.6 mg tablet, TAKE 1 TABLET (0.6 MG TOTAL) BY MOUTH ONCE DAILY. AS NEEDED FOR GOUT, Disp: 90 tablet, Rfl: 3    cyanocobalamin 1,000 mcg/mL injection, Inject 1,000 mcg into the muscle every 7 days., Disp: 10 mL, Rfl: 5    fluticasone propionate (FLONASE) 50 mcg/actuation nasal spray, 2 sprays (100 mcg total) by Each Nostril route once daily., Disp: , Rfl:     fluticasone-umeclidin-vilanter (TRELEGY ELLIPTA) 200-62.5-25 mcg inhaler, Inhale 1 puff into the lungs once daily., Disp: 60 each, Rfl: 11    furosemide (LASIX) 20 MG tablet, TAKE 1 TABLET (20 MG TOTAL) BY MOUTH 2 (TWO) TIMES DAILY AS NEEDED (SWELLING, WEIGHT GAIN, DYSPNEA)., Disp: 180 tablet, Rfl: 1    gabapentin (NEURONTIN) 300 MG capsule, Take 1 capsule (300 mg total) by mouth 3 (three) times daily., Disp: 90 capsule, Rfl: 0     LIDOcaine 4 % Gel, Apply 1 g topically 2 (two) times daily as needed (foot pain)., Disp: , Rfl:     LIDOcaine-prilocaine (EMLA) cream, Apply topically as needed., Disp: 30 g, Rfl: 3    mirtazapine (REMERON) 7.5 MG Tab, Take 1 tablet (7.5 mg total) by mouth every evening., Disp: 90 tablet, Rfl: 1    montelukast (SINGULAIR) 10 mg tablet, Take 10 mg by mouth every evening., Disp: , Rfl:     pantoprazole (PROTONIX) 40 MG tablet, TAKE 1 TABLET BY MOUTH TWICE A DAY, Disp: 180 tablet, Rfl: 3    QUEtiapine (SEROQUEL) 50 MG tablet, Take 1 tablet (50 mg total) by mouth 2 (two) times daily., Disp: 180 tablet, Rfl: 1    syringe, disposable, 1 mL Syrg, 1 Stick by Misc.(Non-Drug; Combo Route) route once a week., Disp: 25 each, Rfl: 1    tiotropium bromide (SPIRIVA RESPIMAT) 2.5 mcg/actuation inhaler, INHALE 2 PUFFS BY MOUTH INTO THE LUNGS DAILY, Disp: 4 g, Rfl: 2    traMADoL (ULTRAM) 50 mg tablet, Take 1 tablet (50 mg total) by mouth every 8 (eight) hours as needed for Pain (breakthrough pain)., Disp: 30 tablet, Rfl: 0  No current facility-administered medications for this visit.    Facility-Administered Medications Ordered in Other Visits:     mupirocin 2 % ointment, , Nasal, On Call Procedure, Kang Diaz MD, Given at 10/25/23 1045    tranexamic acid (CYKLOKAPRON) 1,000 mg in sodium chloride 0.9 % 100 mL IVPB (MB+), 1,000 mg, Intravenous, Once, Kang Diaz MD    tranexamic acid (CYKLOKAPRON) 1,000 mg in sodium chloride 0.9 % 100 mL IVPB (MB+), 1,000 mg, Intravenous, Once, Kang Diaz MD     Social History     Tobacco Use   Smoking Status Former    Current packs/day: 0.00    Average packs/day: 0.5 packs/day for 20.0 years (10.0 ttl pk-yrs)    Types: Cigarettes    Start date: 1979    Quit date: 1999    Years since quittin.5    Passive exposure: Past   Smokeless Tobacco Never        Review of patient's allergies indicates:   Allergen Reactions    Hydromorphone Anxiety, Other (See Comments) and  Hallucinations     Severe agitation      Psychotherapy:  Target symptoms: anxiety , medical (diff to gain wt COPD), relationship with   Why chosen therapy is appropriate versus another modality: relevant to diagnosis  Outcome monitoring methods: self-report, lab data, observation  Therapeutic intervention type: insight oriented psychotherapy, supportive psychotherapy  Topics discussed/themes: see target symptoms  The patient's response to the intervention is accepting. The patient's progress toward treatment goals is fair.   Duration of intervention: 20 minutes.     ASSESSMENT:   Encounter Diagnoses   Name Primary?    Anxiety, unspecified, tho prominent generalized features (ie, worrier / chintan whenexacerbated by flare ups of  health conditions Yes    CKD stage G3a/A1, GFR 45-59 and albumin creatinine ratio <30 mg/g     Pulmonary emphysema, unspecified emphysema type     Nicotine dependence, cigarettes, in FULL REMISSION:in past smoked for 17yrs from 16y to about 34y / LUPE:F pack a day     History Situational (ie Stress Anxiety Induced) REFLEX SYNCOPE syncope ([facility] admission 12-25-23     Bilateral carotid artery stenosis     Subclinical hyperthyroidism     Heart failure with preserved ejection fraction, unspecified HF chronicity        [facility]  MD addressed long term clinical issues (anxiety) that are of  Increased Complexity. Note  Collateral issues impacting such issue(s) include : multiple medical ( include in part: , emphysema history GI bleed, mobility issues, BMI below 19 other as per prob list);  . This MD  1)personally  saw patient ;  obtained additional history ; and 2) reviewed notes of collateral providers (where appropriate).  3) MD assessed long term complex condition(s); 4) discussed with pt; and 5) made treatment decisions in concert with pt (including: med mngt and/or psychotherapy).     Patient Instructions       PLAN:     Follow Up Oct 6 2025 @ 2 pm IN CLINIC    Psyc meds:  "  Seroquel 50 mg  TWICE a day #180 x1  Remeron 7.5 mg at bed #90 x1    Continues to see   Margaret Walton LCSW     References:     Relaxation stress reduction workbook: ABBY Freeman PhD ( used: $7-10)    Feeling Good Website: Danis Hernandez MD / [email or web address] website (free) / [email or web address]. PODCASTS    Anxiety &  phobia workbook by AHMET Oneal PhD  (web retailers: used: $ 7-10)    [place]: Path to Better Sleep : https://www.veterantraining.va.gov/insomnia/ (free)     Pt expressed appreciation for the visit today and did not have further question at this time though pt  was still informed to:     Call  if problems.    Call / Report Side Effects to Psyc MD     Encouraged to follow up with primary care / Gen Med MD for continued monitoring of general health and wellness.    Understanding was expressed; and no further concerns nor questions were raised at this time.     Remember healthy self care:   eat right  attempt adequate rest   HANDWASHING / encourage such chintan. During this corona virus time   walk or light exercise within reason and as your general med team approves  read or explore any of reference materials / homework mentioned  reach out (I.e.,  connect with)  others who nuture and bring out best in you  avoid risky behaviors    Keep your appointments:    IF you  cannot make your appt THEN please call [phone]  or go online (via My Chart pedro) to reschedule.    It is the responsibility of the patient to reschedule an appointment if an appointment has been canceled or missed.    Avoid  alcohol and illicit substances.  Look for the positive.  All is often relative-seek balance  Call sooner if needed : [phone]  Call [phone] or go to Emergency Room  (ER)  if  any acute concerns     >>Excerpt from Initial Psaddy dawn from 4-17-24 <<    Jessica Floyd initially presented to psyc clinic as a 73 y.o. female (5' 3" and 116 lbs) sittingin wheelchair.  stayed in wait area tho seems caring well intended.     Pt was referred " "by [facility] pulmonologist: Alejandra WALLER.      She is a former smoker of 18 yrs (from 16y to 34y).     She has pulmonary emphysema; and reports getting heightened anxiety at times (often assoc with her pulmonary status and such has has led to situational reflex syncope.      Pt says such was originally diag in [facility] yrs ago.     Says that she a had recent bout in 12- and admitted to [facility] inpatient,     She does present as "worrier" / and seems to have anticipatory worry. .     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      She has Been  x 57y to [place] ("[place]"); HE was director prop [facility] for [facility]; did that 3 yrs     He retired and did facilities [facility] for [facility] carla ; based in [place] 12y and then he went head [facility] ; so moved [facility] x 6yrs/ for a time he was also placed [place] x 3 yrs while she had moved to [place].     Says when gets stressed nervous; she says was called reflex syncope; says has had since diag in [facility] (2015).     Says when was put on seroquel in Feb 2024 when in hospital ; says has not had ANY reflex syncopal    Excerpt of 12-25-23 note of [facility]ist Earline Hernández MD     * Acute hypoxic respiratory failure  Patient with Hypoxic Respiratory failure which is Acute on chronic. she is on home oxygen at 1L PRN LPM. Supplemental oxygen was provided and noted- Oxygen Concentration (%): [30] 30 Signs/symptoms of respiratory failure include- tachypnea, increased work of breathing, respiratory distress, and wheezing. Contributing diagnoses includes - Aspiration, CHF, COPD, and Pneumonia Labs and images were reviewed. " "Patient Has not had a recent ABG. Will treat underlying causes and adjust management of respiratory failure as follows     73-year-old female with a past medical history of former smoking, CAD, R Carotid stent, dyslipidemia, HTN, gout, anxiety, prev SAH (1999) COPD on home O2 (1L PRN), mild pulmonary hypertension, stress induced reflex syncope, history of ESBL UTI, now presenting with chief complaint of shortness of breath.  Patient reports that yesterday during the day she experienced worsening shortness of breath and cough.  She has had SOB for weeks, possibly months, and a lingering cough since her COVID infection last month but yesterday had an acute change. Xmas eric she was not feeling well, and around 6 or 7 pm she called ambulence. After some SOB at home and 3L didn't help. Was observed by  to be satting 81 or 82. Baseline is 94 or 95. Patient denies any hemoptysis, fevers, or sick contacts. Admits to SAMPSON, productive gray cough, chest tightness with exertion improved with rest, leg swelling, poor exercise tolerance, and balance issues. Reports weight loss of 20-30 lbs since 6 months. Vaxed for flu rsv, pna, and covid. Follows with pulm OP. Recent hospitalization 1 month ago for Covid.  claims pt "does not walk at all" and gets SOB after walking to the restroom. Uses asistance from others or cane / walker to ambulate.      EMS reports that patient had saturation of 80% on room air requiring 3 L nasal cannula.  Albuterol neb was given by EMS in addition to 125 mg of methylprednisone. In the Ed at presentation her vitals were: /100 P116 RR22 98.5F O2% 93, Initally requiring Bipap, now satting 97 with 8L NC s/p breathing treatment. WBC 31.6 H &H 10.7 33.1  Na 133 K 2.9 CO2 20 Albumin 2.6  Trop WNL Lactate WNL Flu A+ PCO2 33 Po 26 PHCO3 21.7 EKG? Sinus Tach around 115, T wave inversions V3-V5, II, III, AVF, Xray: Patchy right basilar consolidation may relate to infection/pneumonia " "or aspiration.  Clinical correlation and continued imaging follow-up is recommended. Procal +.      Receive Rocephin, Azithro, Mag, and Steroids in ED. Admitted to [facility] for management of Acute Hypoxic Respiratory Failure.         Plan  Q4Hr Breathing treatments  CAP coverage with Ceftriaxone and Azithro  Tamiflu for influenza   Lasix for volume overload 2/2 HFpEF  Continue home inhalers  Prednisone 40 mg qd  Ween O2 as needed      No psychosis     No SI no HI     No evidence of rekha     No substance use     Denies prior behavioral health treatment / says no counseling nor seeing psyc providers / did not santo on me     Past Psychiatric History:      Previous psychiatric treatment and medication trials: comes in on SEROQUEL 50 mg  Previous psychiatric hospitalizations: No  Previous diagnoses: No  Previous suicide attempts: No     Abuse History:   Physical Abuse: No  Sexual Abuse: No  Other Abuse / Trauma : No     Substance Abuse History:  Use of alcohol: one glass a day / no control issues  Tobacco use: FORMER SMOKER about 18yrs from 16y to about 34y / smoked LUPE:F pack a day  Cannabis: no  Recreational drugs: none     Family History Mental Health:   Suicide : No  Other: No     Weapons: No     Psyc Meds:   Seroquel 50 mg at bed     Top 3 Stressors: health pulmonary (says we koffi)      Cerebral aneurysm "CLIP in brian 1998" [facility]      Musculoskeletal : no tremors   History Seizures? Mini seizure and pass out / reflux syncope     History Head Injury? N arlene has clip for aneurysm    3 wishes ? Health / stamina  Psychosocial History :     City [facility]: [facility]     Siblings (full or half)  Brothers: 1 who passed 67y health issues  Sisters: none     Parents :     Briefly Describe your Mom: hard working uneducated tho wished she could have been educated worked 2-3 / she was from [place]  Briefly Describe your Dad: [employer]IC [employer] / he was from [place] / (pt sponsored him for citizen when she 17y ; " never abusive ; he was kind loving     Bio Mom: Occupation:  /     Bio Dad: Occupation: in [place]     Marital Status: one time / 57 yrs     Children   Girls (ages): three Hannah (54y / outside [place]/ psyc ); Aby (50y [facility] and hired all charter schools [place] ; Pattie 46y in [place] head events cystic fibrosis in [facility]  Boys (ages): none     Education: 3 credit short of college degree ; [employer] high / ([facility] went to [place] and [facility]; help a [employer])     Adventist / Spiritual: raised Oriental orthodox  Restoration / became unitarian /  born [place] / came to [place] to get educated ; he was going [employer] / hired to teach blind      Legal Issues? none  DWI ?n  senior care time? n     Ever homeless? n     Employment:   Last Job? As below  Longest Job? 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

## 2025-07-01 NOTE — PATIENT INSTRUCTIONS
PLAN:     Follow Up Oct 6 2025 @ 2 pm IN CLINIC    Psyc meds:   Seroquel 50 mg  TWICE a day #180 x1  Remeron 7.5 mg at bed #90 x1    Continues to see   Margaret Walton LCSW     References:     Relaxation stress reduction workbook: ABBY Freeman PhD ( used: $7-10)    Feeling Good Website: Danis Hernandez MD / [email or web address] website (free) / [email or web address]. PODCASTS    Anxiety &  phobia workbook by AHMET Oneal PhD  (web retailers: used: $ 7-10)    VA: Path to Better Sleep : https://www.veterantraining.va.gov/insomnia/ (free)     Pt expressed appreciation for the visit today and did not have further question at this time though pt  was still informed to:     Call  if problems.    Call / Report Side Effects to Psyc MD     Encouraged to follow up with primary care / Gen Med MD for continued monitoring of general health and wellness.    Understanding was expressed; and no further concerns nor questions were raised at this time.     Remember healthy self care:   eat right  attempt adequate rest   HANDWASHING / encourage such chintan. During this corona virus time   walk or light exercise within reason and as your general med team approves  read or explore any of reference materials / homework mentioned  reach out (I.e.,  connect with)  others who nuture and bring out best in you  avoid risky behaviors    Keep your appointments:    IF you  cannot make your appt THEN please call [phone]  or go online (via My Chart pedro) to reschedule.    It is the responsibility of the patient to reschedule an appointment if an appointment has been canceled or missed.    Avoid  alcohol and illicit substances.  Look for the positive.  All is often relative-seek balance  Call sooner if needed : [phone]  Call 911 or go to Emergency Room  (ER)  if  any acute concerns    [FreeTextEntry1] : Mr. Russ Schultz is a pleasant 65 year-old man with CAD s/p stent in July 2021, hypertension, hyperlipidemia, and cardiac arrhythmias. He was diagnosed with Atrial Fibrillation on 10/28/2021 while in [place]. He woke up with palpitations and SOB; called ambulance; give Amiodarone and Eliquis;\par EMS original assessment was SVT; on arrival to ER he was in AF. Review of ECGs shows Atrial Flutter/Atrial Fibrillation. He is physically active: biking outside; treadmill; light weights -1.5 hours 4 days a week- His CHADSVASC score is 3 (Age, CAD, HTN). He is on Eliquis. \par \par Patient has mild symptoms of palpitations; Metoprolol increased; He is wearing MCOT from Dr. Arrieta.\par \par I recommend to continue Eliquis, and Metoprolol. \par \par Use of betablocker/CCB/antiarrhythmics limited by resting bradycardia. \par \par I discussed hydration and avoiding ETOH.\par \par I recommend evaluation for SOMMER.\par \par He went for EP study on 1/12/2022 and he had no inducible SVT. During EP study he had ST depression, leading to stopping procedure and going for emergent cath. Cardiac cath showed patent stents. He underwent Cryoballoon ablation PVI + RF CTI on 1/18/2022; \par \par Patient has no arrhythmias since the catheter ablation. There are no groin hematomas or bleeding. Patient is pleased with results of catheter ablation although is aware with the risk of recurrent symptoms and need for another ablation. Post ablation care was discussed in great length. I discussed with patient contacting me in case of any symptoms suggestive of recurrence.\par \par I discussed with patient plan of care in great details. I answered all his questions to his satisfaction. Patient was pleased with the visit.\par \par Patient will follow with me in 6 months’ time. Please do not hesitate to contact me at [phone] if you have any further questions regarding this patient care. [EKG obtained to assist in diagnosis and management of assessed problem(s)] : EKG obtained to assist in diagnosis and management of assessed problem(s)

## 2025-07-02 ENCOUNTER — INFUSION (OUTPATIENT)
Dept: INFUSION THERAPY | Facility: OTHER | Age: 75
End: 2025-07-02
Attending: INTERNAL MEDICINE
Payer: MEDICARE

## 2025-07-02 VITALS
DIASTOLIC BLOOD PRESSURE: 73 MMHG | SYSTOLIC BLOOD PRESSURE: 122 MMHG | TEMPERATURE: 98 F | HEART RATE: 92 BPM | OXYGEN SATURATION: 89 %

## 2025-07-02 DIAGNOSIS — D62 ACUTE BLOOD LOSS ANEMIA: ICD-10-CM

## 2025-07-02 DIAGNOSIS — D50.0 IRON DEFICIENCY ANEMIA SECONDARY TO BLOOD LOSS (CHRONIC): Primary | ICD-10-CM

## 2025-07-02 PROCEDURE — 96374 THER/PROPH/DIAG INJ IV PUSH: CPT

## 2025-07-02 PROCEDURE — 25000003 PHARM REV CODE 250: Performed by: INTERNAL MEDICINE

## 2025-07-02 PROCEDURE — 63600175 PHARM REV CODE 636 W HCPCS: Mod: JZ,TB | Performed by: INTERNAL MEDICINE

## 2025-07-02 RX ORDER — EPINEPHRINE 0.3 MG/.3ML
0.3 INJECTION SUBCUTANEOUS ONCE AS NEEDED
OUTPATIENT
Start: 2025-07-09

## 2025-07-02 RX ORDER — SODIUM CHLORIDE 0.9 % (FLUSH) 0.9 %
10 SYRINGE (ML) INJECTION
OUTPATIENT
Start: 2025-07-09

## 2025-07-02 RX ORDER — SODIUM CHLORIDE 0.9 % (FLUSH) 0.9 %
10 SYRINGE (ML) INJECTION
Status: DISCONTINUED | OUTPATIENT
Start: 2025-07-02 | End: 2025-07-02 | Stop reason: HOSPADM

## 2025-07-02 RX ORDER — HEPARIN 100 UNIT/ML
500 SYRINGE INTRAVENOUS
OUTPATIENT
Start: 2025-07-09

## 2025-07-02 RX ORDER — HEPARIN 100 UNIT/ML
500 SYRINGE INTRAVENOUS
Status: DISCONTINUED | OUTPATIENT
Start: 2025-07-02 | End: 2025-07-02 | Stop reason: HOSPADM

## 2025-07-02 RX ADMIN — SODIUM CHLORIDE: 9 INJECTION, SOLUTION INTRAVENOUS at 02:07

## 2025-07-02 RX ADMIN — FERUMOXYTOL 510 MG: 510 INJECTION INTRAVENOUS at 02:07

## 2025-07-02 NOTE — PROGRESS NOTES
Subjective:       Patient ID: Jessica Floyd is a 74 y.o. female.    Chief Complaint: Wound Consult      Patient presents with her daughter for an evaluation of a left ankle and lower extremity wound. She reports her ankle wound started as a small blister from her shoes since April 2025. This ruptured and she is left with an open wound.  She is unsure how her left lower extremity wound started but believes it started in June 2025. Pt has at home health wound care changing her dressing once a week. They are utilizing a bandaid. Pt reports a large amount of drainage. Pt reports left lower extremity edema. She has been taking HCTZ for leg swelling. Pt reports being unable to tolerate leg elevation due to pain. Patient's daughter reports the first week of July her mother started not being able to stand on her left leg. She reports she is wheelchair bound now. Pt follows with vascular surgery for PVD. She discussed revascularization options on 6/10/25. She decided to wait and come back on 7/22/25 to discuss options. Pt previously smoked 1 ppd for 30 years. She quit in 2000. Denies fever, chills, erythema, warmth, or purulent drainage.    5/30/25 ABIs:  Rt ANGEL  0.19   Rt TBI  0.16   Lt ANGEL 0.00   Lt TBI   0.00   PAD severity based on ANGEL on right:    Ischemic PAOD   PAD severity based on ANGEL left:    Ischemic PAOD   Rt toe BP - PPG    24 mmHg   Rt toe digit waveform: moderately dampened   Lt toe BP - PPG    0 mmHg   Lt toe digit waveform: critical, absent   Right Leg: Segmental Pressures suggest Ischemic PAOD peripheral arterial occlusive disease. However, PVR waveforms suggest moderate to severe peripheral arterial occlusive disease.   Right Great Toe: PPG waveforms described above. A TBI of 0.16 with a great toe pressure of 24 mmHg is noted.   Left Leg: Segmental pressures and PVR waveforms suggest Ischemic PAOD. No audible Doppler signal detected to obtain a reliable ANGEL.   Lt lower digits: Toe PPG waveforms as described  above. A TBI of 0 with a great toe pressure of 0 mmHg is noted.     5/28/25 arterial ultrasound bilateral lower extremity:  Abnormal velocities and monophasic waveforms throughout the arteries of the left lower extremity suggesting high-grade proximal inflow stenosis.  Overall similar to prior exam 05/19/2024.  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  Vascular surgical consultation suggested.    5/28/25 left lower extremity venous ultrasound:  No evidence of deep venous thrombosis in the left lower extremity.     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 There is distal reconstitution. Severe calcific atherosclerosis of the aorta and its major branches.         Review of Systems   Constitutional:  Negative for activity change, chills, diaphoresis, fatigue and fever.   Respiratory:  Negative for apnea, chest tightness and shortness of breath.    Cardiovascular:  Positive for leg swelling. Negative for chest pain and palpitations.   Musculoskeletal:  Negative for gait problem and joint swelling.   Skin:  Positive for wound. Negative for pallor and rash.   Neurological:  Negative for syncope, weakness and numbness.   Psychiatric/Behavioral:  Negative for agitation. The patient is not nervous/anxious.    All other systems reviewed and are negative.      Objective:      Physical Exam  Vitals reviewed.   Constitutional:       General: She is not in acute distress.     Appearance: Normal appearance.   Cardiovascular:      Pulses:           Dorsalis pedis pulses are 0 on the left side.        Posterior tibial pulses are 0 on the left side.   Pulmonary:      Effort: No respiratory distress.   Musculoskeletal:         General: No swelling.   Skin:     General: Skin is warm and dry.      Findings: Erythema (resolves with leg  elevation) and wound present.          Neurological:      General: No focal deficit present.      Mental Status: She is alert and oriented to person, place, and time.   Psychiatric:         Mood and Affect: Mood normal.         Behavior: Behavior normal.         Thought Content: Thought content normal.         Judgment: Judgment normal.         Assessment:       1. Open wound of left ankle, initial encounter    2. Heart failure with preserved ejection fraction, unspecified HF chronicity    3. PAD (peripheral artery disease)    4. CKD stage G3a/A1, GFR 45-59 and albumin creatinine ratio <30 mg/g    5. Body mass index (BMI) 19.9 or less, adult    6. Former smoker    7. Edema of left lower extremity           Wound Ulceration Left lateral Ankle (Active)     Ankle   Present on Original Admission:    Primary Wound Type: Ulceration   Side: Left   Orientation: lateral   Wound Approximate Age at First Assessment (Weeks):    Wound Number:    Is this injury device related?:    Incision Type:    Closure Method:    Wound Description (Comments):    Type:    Additional Comments:    Ankle-Brachial Index:    Pulses:    Removal Indication and Assessment:    Wound Outcome:    Wound Image   07/08/25 1312   Dressing Appearance Dry;Intact;Clean;Moist drainage 07/08/25 1312   Drainage Amount Large 07/08/25 1312   Drainage Characteristics/Odor Serosanguineous 07/08/25 1312   Yellow (%), Wound Tissue Color 100 % 07/08/25 1312   Periwound Area Intact;Edematous;Macerated;Redness 07/08/25 1312   Wound Length (cm) 4 cm 07/08/25 1312   Wound Width (cm) 4.9 cm 07/08/25 1312   Wound Depth (cm) 0.1 cm 07/08/25 1312   Wound Volume (cm^3) 1.026 cm^3 07/08/25 1312   Wound Surface Area (cm^2) 15.39 cm^2 07/08/25 1312   Supply Surface Area (L x W) 19.6 sq cm 07/08/25 1312   Care Cleansed with:;Soap and water 07/08/25 1312   Dressing Applied;Absorptive Pad;Calcium alginate;Rolled gauze;Other (comment) 07/08/25 1312   Periwound Care Skin barrier film  applied 07/08/25 1312            Wound Ulceration Left lower;lateral Leg (Active)     Leg   Present on Original Admission:    Primary Wound Type: Ulceration   Side: Left   Orientation: lower;lateral   Wound Approximate Age at First Assessment (Weeks):    Wound Number:    Is this injury device related?:    Incision Type:    Closure Method:    Wound Description (Comments):    Type:    Additional Comments:    Ankle-Brachial Index:    Pulses:    Removal Indication and Assessment:    Wound Outcome:    Dressing Appearance Dry;Intact;Clean;Moist drainage 07/08/25 1312   Drainage Amount Large 07/08/25 1312   Drainage Characteristics/Odor Serosanguineous 07/08/25 1312   Yellow (%), Wound Tissue Color 100 % 07/08/25 1312   Periwound Area Intact;Edematous;Redness 07/08/25 1312   Wound Length (cm) 3 cm 07/08/25 1312   Wound Width (cm) 3 cm 07/08/25 1312   Wound Depth (cm) 0.1 cm 07/08/25 1312   Wound Volume (cm^3) 0.471 cm^3 07/08/25 1312   Wound Surface Area (cm^2) 7.07 cm^2 07/08/25 1312   Supply Surface Area (L x W) 9 sq cm 07/08/25 1312   Care Cleansed with:;Soap and water 07/08/25 1312   Dressing Applied;Absorptive Pad;Calcium alginate;Rolled gauze;Other (comment) 07/08/25 1312   Periwound Care Skin barrier film applied 07/08/25 1312       Jessica was seen in the clinic room and placed in the supine position on the treatment table.  The dressing was removed and the area was cleansed with Easi-clense sponges and dried thoroughly. No odor or warmth noted. Erythema noted but resolves with leg elevation. Deferred sharp debridement due to vascular status.    Plan of Care: Gentian violet to periwound, drawtex, aquacel, mextra pad, kerlix, and flex-net to secure bandages.        Plan:       Discussed with Dr. Kwon- increased wound measurements, new wound formation, increased pain, and inability to bear weight on left lower extremity. Same day appointment scheduled with Dr. Kwon.     Left ankle was dressed as detailed  above.  Patient was instructed to not get the dressings wet and to use cast covers for showering.  Should the dressing become wet, she is to remove it, place a moist dressing over the wound, cover with gauze and roll gauze  and to secure bandages.  She should then notify this office as soon as possible to have a new dressing applied.    Discussed nutrition and the role of protein in wound healing with the patient. Instructed patient to optimize protein for wound healing within CKD restrictions.    Discussed lower extremity edema with patient. Instructed patient to elevate legs whenever sedentary, follow a low sodium diet, and to take HCTZ as prescribed. Discussed sodium limitations- limit sodium intake to 2,000 mg daily.    Did not apply compression therapy due to PAD.    Discussed signs and symptoms of cellulitis- none appreciated.     Discussed PAD and wound healing.     Faxed home health orders.  HOME HEALTH WOUND CARE ORDERS  D/C previous orders  Wound care and nurse visits   2 X a week and PRN complications  Wound location- left ankle and left lower extremity  1. Cleanse wound with saline or wound cleanser    ( pt may shower)  2.Apply- gentian violet to periwounds, drawtex, aquacel, mextra pad, kerlix, and  flex-net to secure bandages. Do not apply compression therapy.  3.Cover with appropriate cover dressing and contact the office for any substitutions in dressings  Monitor for infection, teach patient/caregiver signs and symptoms, as well as how to do dressing changes        Written and verbal instructions given to patient  RTC in 4 weeks, or sooner if needed      Niyah Zamorano PA-C

## 2025-07-02 NOTE — PLAN OF CARE
Feraheme infusion administered no reaction. Patient tolerated well. Patient accompanied by daughter for discharge home. 24 gauge IV removed, catheter intact. No apparent distress noted. Discharge instructions given to patient. Patient understands instructions. Follow up appointment scheduled 7/9/25 and AVS via [facility] portal.

## 2025-07-07 ENCOUNTER — TELEPHONE (OUTPATIENT)
Dept: OPTOMETRY | Facility: CLINIC | Age: 75
End: 2025-07-07
Payer: MEDICARE

## 2025-07-07 NOTE — TELEPHONE ENCOUNTER
Called pt informed that date was not available and next appt is 12/2025 if she would like to reschedule

## 2025-07-08 ENCOUNTER — LAB VISIT (OUTPATIENT)
Dept: LAB | Facility: OTHER | Age: 75
End: 2025-07-08
Attending: INTERNAL MEDICINE
Payer: MEDICARE

## 2025-07-08 ENCOUNTER — TELEPHONE (OUTPATIENT)
Dept: HEMATOLOGY/ONCOLOGY | Facility: CLINIC | Age: 75
End: 2025-07-08
Payer: MEDICARE

## 2025-07-08 ENCOUNTER — OFFICE VISIT (OUTPATIENT)
Dept: WOUND CARE | Facility: CLINIC | Age: 75
End: 2025-07-08
Payer: MEDICARE

## 2025-07-08 ENCOUNTER — OFFICE VISIT (OUTPATIENT)
Dept: VASCULAR SURGERY | Facility: CLINIC | Age: 75
End: 2025-07-08
Payer: MEDICARE

## 2025-07-08 ENCOUNTER — TELEPHONE (OUTPATIENT)
Dept: INTERNAL MEDICINE | Facility: CLINIC | Age: 75
End: 2025-07-08

## 2025-07-08 VITALS
OXYGEN SATURATION: 98 % | BODY MASS INDEX: 19.49 KG/M2 | SYSTOLIC BLOOD PRESSURE: 111 MMHG | DIASTOLIC BLOOD PRESSURE: 66 MMHG | WEIGHT: 110 LBS | HEIGHT: 63 IN | HEART RATE: 109 BPM

## 2025-07-08 VITALS
BODY MASS INDEX: 19.49 KG/M2 | TEMPERATURE: 99 F | HEIGHT: 63 IN | SYSTOLIC BLOOD PRESSURE: 156 MMHG | RESPIRATION RATE: 99 BRPM | WEIGHT: 110 LBS | HEART RATE: 96 BPM | DIASTOLIC BLOOD PRESSURE: 79 MMHG

## 2025-07-08 DIAGNOSIS — D50.0 IRON DEFICIENCY ANEMIA SECONDARY TO BLOOD LOSS (CHRONIC): ICD-10-CM

## 2025-07-08 DIAGNOSIS — I73.9 PAD (PERIPHERAL ARTERY DISEASE): Primary | ICD-10-CM

## 2025-07-08 DIAGNOSIS — D50.8 OTHER IRON DEFICIENCY ANEMIA: Primary | ICD-10-CM

## 2025-07-08 DIAGNOSIS — I73.9 PAD (PERIPHERAL ARTERY DISEASE): ICD-10-CM

## 2025-07-08 DIAGNOSIS — Z87.891 FORMER SMOKER: ICD-10-CM

## 2025-07-08 DIAGNOSIS — N18.31 CKD STAGE G3A/A1, GFR 45-59 AND ALBUMIN CREATININE RATIO <30 MG/G: ICD-10-CM

## 2025-07-08 DIAGNOSIS — I50.30 HEART FAILURE WITH PRESERVED EJECTION FRACTION, UNSPECIFIED HF CHRONICITY: Chronic | ICD-10-CM

## 2025-07-08 DIAGNOSIS — I70.222 CRITICAL LIMB ISCHEMIA OF LEFT LOWER EXTREMITY: ICD-10-CM

## 2025-07-08 DIAGNOSIS — D50.0 IRON DEFICIENCY ANEMIA SECONDARY TO BLOOD LOSS (CHRONIC): Primary | ICD-10-CM

## 2025-07-08 DIAGNOSIS — R60.0 EDEMA OF LEFT LOWER EXTREMITY: ICD-10-CM

## 2025-07-08 DIAGNOSIS — D50.8 OTHER IRON DEFICIENCY ANEMIA: ICD-10-CM

## 2025-07-08 DIAGNOSIS — S91.002A OPEN WOUND OF LEFT ANKLE, INITIAL ENCOUNTER: Primary | ICD-10-CM

## 2025-07-08 LAB
ABSOLUTE EOSINOPHIL (OHS): 0.4 K/UL
ABSOLUTE MONOCYTE (OHS): 3.45 K/UL (ref 0.3–1)
ABSOLUTE NEUTROPHIL COUNT (OHS): 9.95 K/UL (ref 1.8–7.7)
ABSOLUTE NEUTROPHIL MANUAL (OHS): 11.2 K/UL
ALBUMIN SERPL BCP-MCNC: 3 G/DL (ref 3.5–5.2)
ALP SERPL-CCNC: 85 UNIT/L (ref 40–150)
ALT SERPL W/O P-5'-P-CCNC: 11 UNIT/L (ref 10–44)
ANION GAP (OHS): 13 MMOL/L (ref 8–16)
ANISOCYTOSIS BLD QL SMEAR: SLIGHT
AST SERPL-CCNC: 25 UNIT/L (ref 11–45)
BASOPHILS # BLD AUTO: 0.07 K/UL
BASOPHILS NFR BLD AUTO: 0.4 %
BILIRUB SERPL-MCNC: 0.8 MG/DL (ref 0.1–1)
BUN SERPL-MCNC: 11 MG/DL (ref 8–23)
CALCIUM SERPL-MCNC: 9.5 MG/DL (ref 8.7–10.5)
CHLORIDE SERPL-SCNC: 99 MMOL/L (ref 95–110)
CO2 SERPL-SCNC: 25 MMOL/L (ref 23–29)
CREAT SERPL-MCNC: 0.9 MG/DL (ref 0.5–1.4)
EOSINOPHIL NFR BLD MANUAL: 2 % (ref 0–8)
ERYTHROCYTE [DISTWIDTH] IN BLOOD BY AUTOMATED COUNT: 21.7 % (ref 11.5–14.5)
ERYTHROCYTE [DISTWIDTH] IN BLOOD BY AUTOMATED COUNT: 21.8 % (ref 11.5–14.5)
GFR SERPLBLD CREATININE-BSD FMLA CKD-EPI: >60 ML/MIN/1.73/M2
GLUCOSE SERPL-MCNC: 102 MG/DL (ref 70–110)
HCT VFR BLD AUTO: 30.4 % (ref 37–48.5)
HCT VFR BLD AUTO: 30.6 % (ref 37–48.5)
HGB BLD-MCNC: 8.8 GM/DL (ref 12–16)
HGB BLD-MCNC: 8.9 GM/DL (ref 12–16)
IMM GRANULOCYTES # BLD AUTO: 0.45 K/UL (ref 0–0.04)
IMM GRANULOCYTES NFR BLD AUTO: 2.8 % (ref 0–0.5)
LYMPHOCYTES # BLD AUTO: 1.66 K/UL (ref 1–4.8)
LYMPHOCYTES NFR BLD MANUAL: 10 % (ref 18–48)
MCH RBC QN AUTO: 27.1 PG (ref 27–31)
MCH RBC QN AUTO: 27.6 PG (ref 27–31)
MCHC RBC AUTO-ENTMCNC: 28.8 G/DL (ref 32–36)
MCHC RBC AUTO-ENTMCNC: 29.3 G/DL (ref 32–36)
MCV RBC AUTO: 94 FL (ref 82–98)
MCV RBC AUTO: 94 FL (ref 82–98)
METAMYELOCYTES NFR BLD MANUAL: 2 %
MONOCYTES NFR BLD MANUAL: 17 % (ref 4–15)
NEUTROPHILS NFR BLD MANUAL: 67 % (ref 38–73)
NEUTS BAND NFR BLD MANUAL: 2 %
NUCLEATED RBC (/100WBC) (OHS): 0 /100 WBC
NUCLEATED RBC (/100WBC) (OHS): 0 /100 WBC
OVALOCYTES BLD QL SMEAR: ABNORMAL
PLATELET # BLD AUTO: 482 K/UL (ref 150–450)
PLATELET # BLD AUTO: 496 K/UL (ref 150–450)
PLATELET BLD QL SMEAR: ABNORMAL
PMV BLD AUTO: 10 FL (ref 9.2–12.9)
PMV BLD AUTO: 10.1 FL (ref 9.2–12.9)
POIKILOCYTOSIS BLD QL SMEAR: SLIGHT
POTASSIUM SERPL-SCNC: 3.5 MMOL/L (ref 3.5–5.1)
PROT SERPL-MCNC: 8.2 GM/DL (ref 6–8.4)
RBC # BLD AUTO: 3.22 M/UL (ref 4–5.4)
RBC # BLD AUTO: 3.25 M/UL (ref 4–5.4)
RELATIVE EOSINOPHIL (OHS): 2.5 %
RELATIVE LYMPHOCYTE (OHS): 10.4 % (ref 18–48)
RELATIVE MONOCYTE (OHS): 21.6 % (ref 4–15)
RELATIVE NEUTROPHIL (OHS): 62.3 % (ref 38–73)
SODIUM SERPL-SCNC: 137 MMOL/L (ref 136–145)
STOMATOCYTES BLD QL SMEAR: PRESENT
TOXIC GRANULES BLD QL SMEAR: PRESENT
WBC # BLD AUTO: 15.98 K/UL (ref 3.9–12.7)
WBC # BLD AUTO: 16.3 K/UL (ref 3.9–12.7)

## 2025-07-08 PROCEDURE — 99999 PR PBB SHADOW E&M-EST. PATIENT-LVL V: CPT | Mod: PBBFAC,TXP,, | Performed by: SURGERY

## 2025-07-08 PROCEDURE — 36415 COLL VENOUS BLD VENIPUNCTURE: CPT | Mod: NTX

## 2025-07-08 PROCEDURE — 85007 BL SMEAR W/DIFF WBC COUNT: CPT | Mod: TXP

## 2025-07-08 PROCEDURE — 99499 UNLISTED E&M SERVICE: CPT | Mod: S$GLB,,,

## 2025-07-08 PROCEDURE — 84075 ASSAY ALKALINE PHOSPHATASE: CPT | Mod: TXP

## 2025-07-08 PROCEDURE — 99999 PR PBB SHADOW E&M-EST. PATIENT-LVL IV: CPT | Mod: PBBFAC,,,

## 2025-07-08 RX ORDER — GABAPENTIN 300 MG/1
300 CAPSULE ORAL 3 TIMES DAILY
Qty: 90 CAPSULE | Refills: 0 | OUTPATIENT
Start: 2025-07-08 | End: 2025-08-07

## 2025-07-08 NOTE — TELEPHONE ENCOUNTER
Returned call to Ms Mcgrath, Ms Floyd's daughter. No answer. Message left on voice mail. Informed Ms Ramila, Dr Rolon request Ms [place] appointment today be rescheduled to July 29th and her lab appointment to also be rescheduled tomorrow before her next Iron infusion. Ms Mcgrath instructed to call back with any questions or concerns she may have.

## 2025-07-08 NOTE — PROGRESS NOTES
VASCULAR SURGERY SERVICE      CHIEF COMPLAINT:  Severe PAD    HISTORY OF PRESENT ILLNESS: Jessica Floyd is a 74 y.o. female with oxygen-dependent COPD, history of life-threatening GI bleed requiring surgery 1 year ago and then very recent episode proximally 10 days ago requiring IR embolization.        Around the the time of her profound anemia and recent hospitalization she also noted some weakness of the left leg.   She states that since getting back home, she is able to walk around her home using a rolling walker which she was doing before this event.  She also admits to some left knee pain.  She denies any pain at rest in her legs or feet.  However she does state that overall she is extraordinarily deconditioned, both legs feel fairly weak.    She denies any history of coronary artery disease    She also noticed that she has nonhealing wound on the lateral left ankle which has been present for proximally 1 month    She is currently taking 1 baby aspirin    07/08/2025:  This is a proximally 4 week follow-up.  In the interim she has required transport to the hospital for hypotension required transfusion.  She is getting iron infusions on a regular basis.  She has become largely nonambulatory due to issues with her left leg.  She does not feel that her overall extraordinarily deconditioned state has made any interval improvement in the last 4 weeks.    With her today is her daughter who is visiting from out of state.  Her  is currently on a trip to [place]    Incidentally, they he had been advised by her PCP to elevate her legs to help with circulation.  However both the patient and daughter noted that her legs really hurt when she did this    Past Medical History:   Diagnosis Date    Allergic rhinitis     Amblyopia     Carotid stenosis     Coronary atherosclerosis     Outside [facility] 6/2014: 20% mLAD, 50% mCx, 20%, mRCA    COVID-19 11/15/2023    Dyslipidemia     ESBL (extended spectrum beta-lactamase) producing  bacteria infection     UTI    Hypertension     Influenza A 12/28/2023    Nausea and vomiting 05/26/2017    Pulmonary emphysema 10/28/2023    SAH (subarachnoid hemorrhage) 08/24/2016 1999, s/p clipping    Subarachnoid hemorrhage due to ruptured aneurysm 1999       Past Surgical History:   Procedure Laterality Date    ADENOIDECTOMY      ANTERIOR CRUCIATE LIGAMENT REPAIR  2012    APPENDECTOMY      CATARACT EXTRACTION W/  INTRAOCULAR LENS IMPLANT Right 11/07/2022    Procedure: EXTRACTION, CATARACT, WITH IOL INSERTION;  Surgeon: Higinio Cristina MD;  Location: [facility] OR;  Service: Ophthalmology;  Laterality: Right;    CATARACT EXTRACTION W/  INTRAOCULAR LENS IMPLANT Left 11/21/2022    Procedure: EXTRACTION, CATARACT, WITH IOL INSERTION;  Surgeon: Higinio Cristina MD;  Location: [facility] OR;  Service: Ophthalmology;  Laterality: Left;    CEREBRAL ANEURYSM REPAIR  1999    clip    COLONOSCOPY N/A 5/16/2024    Procedure: COLONOSCOPY;  Surgeon: Rich Syed MD;  Location: [facility] ENDO (2ND FLR);  Service: Endoscopy;  Laterality: N/A;    COLONOSCOPY, WITH RETROGRADE BALLOON ENTEROSCOPY, SINGLE OR DOUBLE BALLOON N/A 5/29/2025    Procedure: COLONOSCOPY, WITH RETROGRADE BALLOON ENTEROSCOPY, SINGLE OR DOUBLE BALLOON;  Surgeon: Rich Syed MD;  Location: [facility] (2ND FLR);  Service: Endoscopy;  Laterality: N/A;    DENTAL SURGERY      DIAGNOSTIC LAPAROSCOPY N/A 5/17/2024    Procedure: LAPAROSCOPY, DIAGNOSTIC;  Surgeon: Ruben Oscar MD;  Location: [facility] OR 2ND FLR;  Service: General;  Laterality: N/A;    ESOPHAGOGASTRODUODENOSCOPY N/A 5/15/2024    Procedure: EGD (ESOPHAGOGASTRODUODENOSCOPY);  Surgeon: Rich Syed MD;  Location: [facility] ENDO (2ND FLR);  Service: Endoscopy;  Laterality: N/A;    ESOPHAGOGASTRODUODENOSCOPY N/A 5/28/2025    Procedure: EGD (ESOPHAGOGASTRODUODENOSCOPY);  Surgeon: Rich Syed MD;  Location: [facility] ENDO (2ND FLR);  Service: Endoscopy;  Laterality: N/A;    EXCISION, SMALL INTESTINE N/A  "5/17/2024    Procedure: EXCISION, SMALL INTESTINE;  Surgeon: Ruben Oscar MD;  [facility];  Service: General;  Laterality: N/A;    EYE SURGERY Bilateral     cataract removal and lens implants    HIP ARTHROPLASTY Left 05/28/2023    Procedure: TOTAL HIP ARTHROPLASTY;  Surgeon: Juan Wan MD;  [facility];  Service: Orthopedics;  Laterality: Left;    LAPAROTOMY, EXPLORATORY N/A 5/17/2024    Procedure: LAPAROTOMY, EXPLORATORY;  Surgeon: Ruben Oscar MD;  [facility];  Service: General;  Laterality: N/A;    REPAIR, HERNIA, INGUINAL Bilateral 5/17/2024    Procedure: REPAIR, HERNIA, INGUINAL;  Surgeon: Ruben Oscar MD;  [facility];  Service: General;  Laterality: Bilateral;    TONSILLECTOMY         Current Medications[1]    Review of patient's allergies indicates:   Allergen Reactions    Hydromorphone Anxiety, Other (See Comments) and Hallucinations     Severe agitation       Family History   Problem Relation Name Age of Onset    Hypertension Mother      Aneurysm Mother      Hypertension Father      Alcohol abuse Father      Kidney disease Brother      Heart disease Brother      Gout Brother      No Known Problems Daughter Hannah     No Known Problems Daughter Aby     No Known Problems Daughter Diya        Social History[2]    PHYSICAL EXAM:   BP (!) 156/79 (BP Location: Left arm, Patient Position: Sitting)   Pulse 96   Temp 99.2 °F (37.3 °C) (Oral)   Resp (!) 99   Ht 5' 3" (1.6 m)   Wt 49.9 kg (110 lb)   BMI 19.49 kg/m²   Constitutional:  Alert,   She appears extraordinarily frail, as prior.  She does have supplemental oxygen she is traveling with   Neurological: Normal speech  no focal findings  CN II - XII grossly intact.    Psychiatric: Mood and affect appropriate and symmetric.   HEENT: Normocephalic / atraumatic  PERRLA  Midline trachea  No scars across the neck   Cardiac: Regular rate and rhythm.   Pulmonary: " Normal pulmonary effort.   Abdomen: Soft, not distended.     Skin: Warm and well perfused.    Vascular:  Right femoral pulse 1 +left femoral pulse absent pedal pulses absent   Extremities/  Musculoskeletal: No edema.   Both feet have surprisingly good cap refill at the toes no ulcerations or tissue loss in the feet no cyanosis.    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  Right common and external iliacs are patent with a greater than 90% right common femoral artery stenosis that is extraordinarily calcific    IMPRESSION:   Severe PAD L>R.  Surprisingly she is not having ischemic rest pain.  She does have a left lower extremity ulceration although this is at the lateral malleolus and is not primarily ischemic.  Not surprisingly this has enlarged.  That being said, due to the profound lack of our good arterial flow she will have a challenging time healing this  Severely deconditioned with recent hospitalization for life-threatening anemia  Oxygen-dependent COPD    I had a greater than 1 hour treatment time with this family  and patient.  We had a very detailed discussion about the challenges of trying to revascularize her lower extremities.   As per my prior note, she had unfortunately has no appear percutaneous options given the extraordinarily calcified nature of her common femoral arteries.    The minimum requirement to improve her arterial flow to her left leg would be bilateral common femoral endarterectomy is, right-to-left  fem-fem bypass, and extensive stent of the terminal aorta and right iliac system.       I believe her to be a preoperative surgical candidate given her profound deconditioning, severe COPD, and on going GI bleeding.  Furthermore, given her recurrent GI bleeding, we would not be able to treat her with any significant antiplatelet therapy which would greatly magnify the risk of endovascular reconstruction thrombosing.    The patient understands that this is a life versus limb situation, and I believe risk of mortality from revascularization would be quite high.    The daughter and the patient understand this.    They understand that she has significant risk of need for amputation of the left leg, although currently she has absolutely no indication to proceed as such.  She is not having any any significant pain and no infection that can not be treated with antibiotics.  Currently she has not required any antibiotics whatsoever    PLAN:    Continue with wound care  Would consider a high left above-knee amputation if she were to develop pain refractory to narcotics or an infection refractory to antibiotic      KAYLI Kwon III, MD, FACS  Professor and Chief, Vascular and Endovascular Surgery             [1]   Current Outpatient Medications:     allopurinoL (ZYLOPRIM) 300 MG tablet, Take 300 mg by mouth once daily., Disp: , Rfl:     aspirin (ECOTRIN) 81 MG EC tablet, Take 1 tablet (81 mg total) by mouth once daily., Disp: 30 tablet, Rfl: 11    atorvastatin (LIPITOR) 80 MG tablet, TAKE 1 TABLET BY MOUTH EVERY DAY, Disp: 90 tablet, Rfl: 3    [Paused] carvediloL (COREG) 6.25 MG tablet, Take 1 tablet (6.25 mg total) by mouth 2 (two) times daily., Disp: 180 tablet, Rfl: 3    colchicine (COLCRYS) 0.6 mg tablet, TAKE 1 TABLET (0.6 MG TOTAL) BY MOUTH ONCE DAILY. AS NEEDED FOR GOUT, Disp: 90 tablet, Rfl: 3    cyanocobalamin 1,000 mcg/mL injection, Inject 1,000 mcg into the muscle every 7 days., Disp: 10 mL, Rfl: 5     fluticasone propionate (FLONASE) 50 mcg/actuation nasal spray, 2 sprays (100 mcg total) by Each Nostril route once daily., Disp: , Rfl:     fluticasone-umeclidin-vilanter (TRELEGY ELLIPTA) 200-62.5-25 mcg inhaler, Inhale 1 puff into the lungs once daily., Disp: 60 each, Rfl: 11    LIDOcaine 4 % Gel, Apply 1 g topically 2 (two) times daily as needed (foot pain)., Disp: , Rfl:     LIDOcaine-prilocaine (EMLA) cream, Apply topically as needed., Disp: 30 g, Rfl: 3    mirtazapine (REMERON) 7.5 MG Tab, Take 1 tablet (7.5 mg total) by mouth every evening., Disp: 90 tablet, Rfl: 1    montelukast (SINGULAIR) 10 mg tablet, Take 10 mg by mouth every evening., Disp: , Rfl:     pantoprazole (PROTONIX) 40 MG tablet, TAKE 1 TABLET BY MOUTH TWICE A DAY, Disp: 180 tablet, Rfl: 3    QUEtiapine (SEROQUEL) 50 MG tablet, Take 1 tablet (50 mg total) by mouth 2 (two) times daily., Disp: 180 tablet, Rfl: 1    syringe, disposable, 1 mL Syrg, 1 Stick by Misc.(Non-Drug; Combo Route) route once a week., Disp: 25 each, Rfl: 1    tiotropium bromide (SPIRIVA RESPIMAT) 2.5 mcg/actuation inhaler, INHALE 2 PUFFS BY MOUTH INTO THE LUNGS DAILY, Disp: 4 g, Rfl: 2    traMADoL (ULTRAM) 50 mg tablet, Take 1 tablet (50 mg total) by mouth every 8 (eight) hours as needed for Pain (breakthrough pain)., Disp: 30 tablet, Rfl: 0    furosemide (LASIX) 20 MG tablet, TAKE 1 TABLET (20 MG TOTAL) BY MOUTH 2 (TWO) TIMES DAILY AS NEEDED (SWELLING, WEIGHT GAIN, DYSPNEA). (Patient not taking: Reported on 7/8/2025), Disp: 180 tablet, Rfl: 1    gabapentin (NEURONTIN) 300 MG capsule, Take 1 capsule (300 mg total) by mouth 3 (three) times daily., Disp: 90 capsule, Rfl: 0  No current facility-administered medications for this visit.    Facility-Administered Medications Ordered in Other Visits:     mupirocin 2 % ointment, , Nasal, On Call Procedure, Kang Diaz MD, Given at 10/25/23 1045    tranexamic acid (CYKLOKAPRON) 1,000 mg in sodium chloride 0.9 % 100 mL IVPB (MB+),  1,000 mg, Intravenous, Once, Kang Diaz MD    tranexamic acid (CYKLOKAPRON) 1,000 mg in sodium chloride 0.9 % 100 mL IVPB (MB+), 1,000 mg, Intravenous, Once, Kang Diaz MD  [2]   Social History  Tobacco Use    Smoking status: Former     Current packs/day: 0.00     Average packs/day: 0.5 packs/day for 20.0 years (10.0 ttl pk-yrs)     Types: Cigarettes     Start date: 1979     Quit date: 1999     Years since quittin.5     Passive exposure: Past    Smokeless tobacco: Never   Vaping Use    Vaping status: Never Used   Substance Use Topics    Alcohol use: Yes     Alcohol/week: 14.0 standard drinks of alcohol     Types: 14 Glasses of wine per week     Comment: 1 or 2 glasses of red wine    Drug use: No

## 2025-07-09 DIAGNOSIS — D50.0 IRON DEFICIENCY ANEMIA SECONDARY TO BLOOD LOSS (CHRONIC): Primary | ICD-10-CM

## 2025-07-10 ENCOUNTER — TELEPHONE (OUTPATIENT)
Dept: INTERNAL MEDICINE | Facility: CLINIC | Age: 75
End: 2025-07-10
Payer: MEDICARE

## 2025-07-10 DIAGNOSIS — R53.81 PHYSICAL DECONDITIONING: ICD-10-CM

## 2025-07-10 DIAGNOSIS — Z74.09 IMPAIRED MOBILITY AND ADLS: Primary | ICD-10-CM

## 2025-07-10 DIAGNOSIS — J43.2 CENTRILOBULAR EMPHYSEMA: ICD-10-CM

## 2025-07-10 DIAGNOSIS — Z78.9 IMPAIRED MOBILITY AND ADLS: Primary | ICD-10-CM

## 2025-07-10 LAB — PATHOLOGIST REVIEW - CBC/DIFF (OHS): NORMAL

## 2025-07-10 NOTE — TELEPHONE ENCOUNTER
Copied from CRM #[id number]. Topic: General Inquiry - Patient Advice  >> Jul 10, 2025 11:50 AM Med Assistant Baldev wrote:  Updated Home health order request    Caller: Zahra ([facility]    Reason For Call: Zahra is requesting a verbal order to increase the frequency for the pt home  physical therapy. Zahra states the pt has recently demonstrated a functional decline.     Best Call Back:Zahra with [facility]

## 2025-07-10 NOTE — TELEPHONE ENCOUNTER
Copied from CRM #[id number]. Topic: Medications - New Medication Request  >> Jul 10, 2025  4:31 PM Ofelia wrote:  Requesting an RX refill or new RX.    Is this a refill or new RX: NEW    RX name and strength (copy/paste from chart):   [facility] is requesting an ORDER for a  for LTC Assistance    Is this a 30 day or 90 day RX:     Pharmacy name and phone # (copy/paste from chart):  [facility]    Who called and call back number:Mimi [phone] ext [phone]    The doctors have asked that we provide their patients with the following 2 reminders -- prescription refills can take up to 72 hours, and a friendly reminder that in the future you can use your [facility] account to request refills:

## 2025-07-11 ENCOUNTER — TELEPHONE (OUTPATIENT)
Dept: WOUND CARE | Facility: CLINIC | Age: 75
End: 2025-07-11
Payer: MEDICARE

## 2025-07-11 NOTE — TELEPHONE ENCOUNTER
Returned call to Mimi with [facility] regarding CRM message; Mimi advised patient has [facility] wound care provider coming out to the home.  Mimi will speak with patient /daughter to asked if patient is transferring care to [facility] or if patient plans to keep [facility] wound care provider. Mimi will call back next week.

## 2025-07-13 ENCOUNTER — PATIENT MESSAGE (OUTPATIENT)
Dept: INTERNAL MEDICINE | Facility: CLINIC | Age: 75
End: 2025-07-13
Payer: MEDICARE

## 2025-07-14 ENCOUNTER — PATIENT MESSAGE (OUTPATIENT)
Dept: HEMATOLOGY/ONCOLOGY | Facility: CLINIC | Age: 75
End: 2025-07-14
Payer: MEDICARE

## 2025-07-14 ENCOUNTER — PATIENT MESSAGE (OUTPATIENT)
Dept: RHEUMATOLOGY | Facility: CLINIC | Age: 75
End: 2025-07-14
Payer: MEDICARE

## 2025-07-14 RX ORDER — GABAPENTIN 300 MG/1
300 CAPSULE ORAL 3 TIMES DAILY
Qty: 90 CAPSULE | Refills: 0 | OUTPATIENT
Start: 2025-07-14 | End: 2025-08-13

## 2025-07-14 RX ORDER — ALLOPURINOL 300 MG/1
300 TABLET ORAL DAILY
Qty: 90 TABLET | Refills: 0 | Status: SHIPPED | OUTPATIENT
Start: 2025-07-14

## 2025-07-14 RX ORDER — GABAPENTIN 300 MG/1
300 CAPSULE ORAL 3 TIMES DAILY
Qty: 90 CAPSULE | Refills: 0 | Status: SHIPPED | OUTPATIENT
Start: 2025-07-14 | End: 2025-08-13

## 2025-07-14 NOTE — TELEPHONE ENCOUNTER
Pt requesting med refill, med pended. Last prescribed by Dr Latoya SHELL with Jace Valencia MD , 6/30/2025

## 2025-07-14 NOTE — TELEPHONE ENCOUNTER
No care due was identified.  [facility] Embedded Care Due Messages. Reference number: [id number].   7/14/2025 7:20:21 AM CDT

## 2025-07-15 ENCOUNTER — TELEPHONE (OUTPATIENT)
Dept: PSYCHIATRY | Facility: HOSPITAL | Age: 75
End: 2025-07-15
Payer: MEDICARE

## 2025-07-15 NOTE — TELEPHONE ENCOUNTER
Called her owes also spoke to her daughter Hannah  Who is psychotherapy    Tramadol was added for pain for clots in legs    Recommended she cut her Seroquel tabs in half and move from 100 total a day to 50 total // as some report of blood pressure was lower    Told her she also needs to confer with primary care who wrote the tramadol Dr. Valencia    Offered in they accepted appointment with me on July 21, 20    10 30a IN CLINIC    Thanked me for calling  D El WALLER

## 2025-07-16 ENCOUNTER — INFUSION (OUTPATIENT)
Dept: INFUSION THERAPY | Facility: OTHER | Age: 75
End: 2025-07-16
Attending: INTERNAL MEDICINE
Payer: MEDICARE

## 2025-07-16 ENCOUNTER — OFFICE VISIT (OUTPATIENT)
Dept: PODIATRY | Facility: CLINIC | Age: 75
End: 2025-07-16
Payer: MEDICARE

## 2025-07-16 VITALS
TEMPERATURE: 98 F | SYSTOLIC BLOOD PRESSURE: 135 MMHG | HEART RATE: 93 BPM | DIASTOLIC BLOOD PRESSURE: 64 MMHG | OXYGEN SATURATION: 97 %

## 2025-07-16 VITALS — HEIGHT: 63 IN | BODY MASS INDEX: 19.49 KG/M2 | WEIGHT: 110 LBS

## 2025-07-16 DIAGNOSIS — B35.1 ONYCHOMYCOSIS DUE TO DERMATOPHYTE: ICD-10-CM

## 2025-07-16 DIAGNOSIS — D50.0 IRON DEFICIENCY ANEMIA SECONDARY TO BLOOD LOSS (CHRONIC): Primary | ICD-10-CM

## 2025-07-16 DIAGNOSIS — I73.9 PAD (PERIPHERAL ARTERY DISEASE): Primary | ICD-10-CM

## 2025-07-16 DIAGNOSIS — L84 CORN OR CALLUS: ICD-10-CM

## 2025-07-16 DIAGNOSIS — D62 ACUTE BLOOD LOSS ANEMIA: ICD-10-CM

## 2025-07-16 PROCEDURE — 99214 OFFICE O/P EST MOD 30 MIN: CPT | Mod: 25,S$GLB,, | Performed by: PODIATRIST

## 2025-07-16 PROCEDURE — 1159F MED LIST DOCD IN RCRD: CPT | Mod: CPTII,S$GLB,, | Performed by: PODIATRIST

## 2025-07-16 PROCEDURE — 96365 THER/PROPH/DIAG IV INF INIT: CPT

## 2025-07-16 PROCEDURE — 1101F PT FALLS ASSESS-DOCD LE1/YR: CPT | Mod: CPTII,S$GLB,, | Performed by: PODIATRIST

## 2025-07-16 PROCEDURE — 99999 PR PBB SHADOW E&M-EST. PATIENT-LVL III: CPT | Mod: PBBFAC,,, | Performed by: PODIATRIST

## 2025-07-16 PROCEDURE — 3288F FALL RISK ASSESSMENT DOCD: CPT | Mod: CPTII,S$GLB,, | Performed by: PODIATRIST

## 2025-07-16 PROCEDURE — 1126F AMNT PAIN NOTED NONE PRSNT: CPT | Mod: CPTII,S$GLB,, | Performed by: PODIATRIST

## 2025-07-16 PROCEDURE — 25000003 PHARM REV CODE 250: Performed by: INTERNAL MEDICINE

## 2025-07-16 PROCEDURE — 63600175 PHARM REV CODE 636 W HCPCS: Mod: JZ,TB | Performed by: INTERNAL MEDICINE

## 2025-07-16 RX ORDER — EPINEPHRINE 0.3 MG/.3ML
0.3 INJECTION SUBCUTANEOUS ONCE AS NEEDED
OUTPATIENT
Start: 2025-07-16

## 2025-07-16 RX ORDER — EPINEPHRINE 0.3 MG/.3ML
0.3 INJECTION SUBCUTANEOUS ONCE AS NEEDED
Status: DISCONTINUED | OUTPATIENT
Start: 2025-07-16 | End: 2025-07-16 | Stop reason: HOSPADM

## 2025-07-16 RX ORDER — HEPARIN 100 UNIT/ML
500 SYRINGE INTRAVENOUS
OUTPATIENT
Start: 2025-07-16

## 2025-07-16 RX ORDER — SODIUM CHLORIDE 0.9 % (FLUSH) 0.9 %
10 SYRINGE (ML) INJECTION
OUTPATIENT
Start: 2025-07-16

## 2025-07-16 RX ORDER — CICLOPIROX 80 MG/ML
SOLUTION TOPICAL NIGHTLY
Qty: 6.6 ML | Refills: 11 | Status: SHIPPED | OUTPATIENT
Start: 2025-07-16

## 2025-07-16 RX ORDER — HEPARIN 100 UNIT/ML
500 SYRINGE INTRAVENOUS
Status: DISCONTINUED | OUTPATIENT
Start: 2025-07-16 | End: 2025-07-16 | Stop reason: HOSPADM

## 2025-07-16 RX ORDER — SODIUM CHLORIDE 0.9 % (FLUSH) 0.9 %
10 SYRINGE (ML) INJECTION
Status: DISCONTINUED | OUTPATIENT
Start: 2025-07-16 | End: 2025-07-16 | Stop reason: HOSPADM

## 2025-07-16 RX ADMIN — FERUMOXYTOL 510 MG: 510 INJECTION INTRAVENOUS at 01:07

## 2025-07-16 NOTE — PROGRESS NOTES
Subjective:      Patient ID: Jessica Floyd is a 74 y.o. female.    Chief Complaint: Nail Care    Painful thick discolored toenails all 10 toes.  Chronic condition.  This exacerbations gradual onset, worsening over the past few months.  Aggravated with socks shoes pressure ambulation.  Prior medical treatment including nail debridement which does relieve symptoms without long-term resolution.  Not at goal.      Cc2  thick hard painful callus in the bottom of the right forefoot by the big toe.  Gradual onset, worsening over the past several months.  Aggravated by socks shoes pressure ambulation.  Periodic debridement help symptoms.  Not at goal.    Review of Systems   Constitutional: Negative for chills, diaphoresis, fever, malaise/fatigue and night sweats.   Cardiovascular:  Negative for claudication, cyanosis, leg swelling and syncope.   Skin:  Positive for nail changes and suspicious lesions. Negative for color change, dry skin, rash and unusual hair distribution.   Musculoskeletal:  Negative for falls, joint pain, joint swelling, muscle cramps, muscle weakness and stiffness.   Gastrointestinal:  Negative for constipation, diarrhea, nausea and vomiting.   Neurological:  Positive for paresthesias and sensory change. Negative for brief paralysis, disturbances in coordination, focal weakness, numbness and tremors.           Objective:      Physical Exam  Constitutional:       General: She is not in acute distress.     Appearance: She is well-developed. She is not diaphoretic.   Cardiovascular:      Pulses:           Popliteal pulses are 1+ on the right side and 1+ on the left side.        Dorsalis pedis pulses are 1+ on the right side and 1+ on the left side.        Posterior tibial pulses are 1+ on the right side and 1+ on the left side.      Comments: Capillary refill 3 seconds all toes/distal feet, all toes/both feet warm to touch.      Negative lymphadenopathy bilateral popliteal fossa and tarsal tunnel.       Negavie lower extremity edema bilateral.  Pulses trace  Musculoskeletal:      Right ankle: No swelling, deformity, ecchymosis or lacerations. Normal range of motion. Normal pulse.      Right Achilles Tendon: Normal. No defects. Lopez's test negative.   Lymphadenopathy:      Lower Body: No right inguinal adenopathy. No left inguinal adenopathy.      Comments: Negative lymphadenopathy bilateral popliteal fossa and tarsal tunnel.    Negative lymphangitic streaking bilateral feet/ankles/legs.   Skin:     General: Skin is warm and dry.      Capillary Refill: Capillary refill takes 2 to 3 seconds.      Coloration: Skin is not pale.      Findings: No abrasion, bruising, burn, ecchymosis, erythema, laceration, lesion or rash.      Nails: There is no clubbing.      Comments:   Focal hyperkeratotic lesion consisting entirely of hyperkeratotic tissue without open skin, drainage, pus, fluctuance, malodor, or signs of infection right 1st MTPJ plantar medial    Toenails 1st, 2nd, 3rd, 4th, 5th  bilateral are hypertrophic thickened 2-3 mm, dystrophic, discolored tanish brown with tan, gray crumbly subungual debris.  Tender to distal nail plate pressure, without periungual skin abnormality of each.    Otherwise, Skin thin, shiny, atrophic, with decreased density and distribution of pedal hair bilateral, but without hyperpigmentation, kirti discoloration,  ulcers, masses, nodules or cords palpated bilateral feet and legs.    Patient has fully dressed wounds in the lateral left foot ankle leg which she defers treatment to her [facility] with [facility] surgery.   Neurological:      Mental Status: She is alert and oriented to person, place, and time.      Sensory: No sensory deficit.      Motor: No tremor, atrophy or abnormal muscle tone.      Gait: Gait normal.      Deep Tendon Reflexes:      Reflex Scores:       Patellar reflexes are 2+ on the right side and 2+ on the left side.       Achilles reflexes  are 2+ on the right side and 2+ on the left side.  Psychiatric:         Behavior: Behavior is cooperative.           Assessment:       Encounter Diagnoses   Name Primary?    PAD (peripheral artery disease) Yes    Onychomycosis due to dermatophyte     Corn or callus          Plan:       Jessica was seen today for nail care.    Diagnoses and all orders for this visit:    PAD (peripheral artery disease)    Onychomycosis due to dermatophyte    Corn or callus      I counseled the patient on her conditions, their implications and medical management.    [place]     Inspect feet multiple times daily for signs of occurrence/recurrence ulceration.    Discussed conservative treatment with shoes of adequate dimensions, material, and style to alleviate symptoms and delay or prevent surgical intervention.    With the patient's permission, I debrided all ten toenails with a sterile nipper and curette, removing all offending nail and debris.  Patient tolerated the procedure well and related significant relief.    With the patient's permission, I debrided hyperkeratotic lesion(s) as above totaling     1 right foot           to, not  Including dermis with sterile #15 blade.  Patient tolerated the procedure well and related significant relief.            No follow-ups on file.

## 2025-07-16 NOTE — PLAN OF CARE
"Pt arrived today complaining of weakness,"acid reflux"  with regurgitation/nausea, and pain to left heel wound. VSS. Message sent to GI staff per pt and daughter's request for f/u appointment. Dr Rolon also notified of pt's complaints today. Recommended pt to transfer to ED for further evaluation.  After explaining recommendation, Pt refusing tranfer to ED dept and wants to speak with her  once she is home. Pt's daughter verbalized understanding that if symptoms get worse, to admit to ED.   Feraheme infusion complete.IV to right forearm DC'd.  Pt verbalized understanding of discharge instructions before leaving via wheelchair.    "

## 2025-07-17 DIAGNOSIS — R11.0 NAUSEA: Primary | ICD-10-CM

## 2025-07-17 RX ORDER — ONDANSETRON 8 MG/1
8 TABLET, ORALLY DISINTEGRATING ORAL EVERY 6 HOURS PRN
Qty: 30 TABLET | Refills: 1 | Status: SHIPPED | OUTPATIENT
Start: 2025-07-17 | End: 2025-08-16

## 2025-07-18 ENCOUNTER — TELEPHONE (OUTPATIENT)
Dept: VASCULAR SURGERY | Facility: CLINIC | Age: 75
End: 2025-07-18
Payer: MEDICARE

## 2025-07-18 NOTE — TELEPHONE ENCOUNTER
Contacted pt's daughter Aby in response to message. States she is calling on her family's behalf to address questions. States she was told by Dr. Kwon that pt hyperbaric wound therapy would likely not benefit pt's wound healing but family is asking if hyperbaric wound therapy would resolve GI bleed. Notified Aby that this will not resolve GI bleed. Aby also asking if pt would be candidate for colostomy due to worsening mobility. Notified Aby that nurse believes it would be unlikely a colorectal surgeon would agree to colostomy for worsening mobility making toileting difficult as this is a major surgery with its own risks,  however pt is welcome to schedule an appt with colorectal surgeon if she wishes. Aby verbalized understanding and states she will make family aware.      Copied from CRM #[id number]. Topic: General Inquiry - Patient Advice  >> Jul 18, 2025  1:10 PM Nallely wrote:  Hi,    Who called: The pt's daughter Aby      Reason:Called to request a call to ask a follow up question from last week's visit. [facility] call her at [phone]      Provider's name:Dr. Kwon       Additional Information:Thank you  
none

## 2025-07-19 ENCOUNTER — PATIENT MESSAGE (OUTPATIENT)
Dept: INTERNAL MEDICINE | Facility: CLINIC | Age: 75
End: 2025-07-19
Payer: MEDICARE

## 2025-07-19 ENCOUNTER — PATIENT MESSAGE (OUTPATIENT)
Dept: VASCULAR SURGERY | Facility: CLINIC | Age: 75
End: 2025-07-19
Payer: MEDICARE

## 2025-07-19 DIAGNOSIS — M25.512 CHRONIC LEFT SHOULDER PAIN: ICD-10-CM

## 2025-07-19 DIAGNOSIS — G89.29 CHRONIC LEFT SHOULDER PAIN: ICD-10-CM

## 2025-07-20 ENCOUNTER — PATIENT MESSAGE (OUTPATIENT)
Dept: RHEUMATOLOGY | Facility: CLINIC | Age: 75
End: 2025-07-20
Payer: MEDICARE

## 2025-07-21 ENCOUNTER — OFFICE VISIT (OUTPATIENT)
Dept: PSYCHIATRY | Facility: CLINIC | Age: 75
End: 2025-07-21
Payer: MEDICARE

## 2025-07-21 VITALS
BODY MASS INDEX: 19.49 KG/M2 | HEART RATE: 91 BPM | WEIGHT: 110 LBS | DIASTOLIC BLOOD PRESSURE: 66 MMHG | SYSTOLIC BLOOD PRESSURE: 117 MMHG

## 2025-07-21 DIAGNOSIS — I74.09 AORTOILIAC OCCLUSIVE DISEASE: ICD-10-CM

## 2025-07-21 DIAGNOSIS — I50.30 HEART FAILURE WITH PRESERVED EJECTION FRACTION, UNSPECIFIED HF CHRONICITY: ICD-10-CM

## 2025-07-21 DIAGNOSIS — R55 SITUATIONAL SYNCOPE: ICD-10-CM

## 2025-07-21 DIAGNOSIS — J43.9 PULMONARY EMPHYSEMA, UNSPECIFIED EMPHYSEMA TYPE: ICD-10-CM

## 2025-07-21 DIAGNOSIS — F17.211 NICOTINE DEPENDENCE, CIGARETTES, IN REMISSION: ICD-10-CM

## 2025-07-21 DIAGNOSIS — E05.90 SUBCLINICAL HYPERTHYROIDISM: ICD-10-CM

## 2025-07-21 DIAGNOSIS — F41.9 ANXIETY: Primary | ICD-10-CM

## 2025-07-21 DIAGNOSIS — N18.31 CKD STAGE G3A/A1, GFR 45-59 AND ALBUMIN CREATININE RATIO <30 MG/G: ICD-10-CM

## 2025-07-21 PROCEDURE — 1159F MED LIST DOCD IN RCRD: CPT | Mod: CPTII,NTX,S$GLB, | Performed by: PSYCHIATRY & NEUROLOGY

## 2025-07-21 PROCEDURE — 3078F DIAST BP <80 MM HG: CPT | Mod: CPTII,NTX,S$GLB, | Performed by: PSYCHIATRY & NEUROLOGY

## 2025-07-21 PROCEDURE — G2211 COMPLEX E/M VISIT ADD ON: HCPCS | Mod: NTX,S$GLB,, | Performed by: PSYCHIATRY & NEUROLOGY

## 2025-07-21 PROCEDURE — 99215 OFFICE O/P EST HI 40 MIN: CPT | Mod: NTX,S$GLB,, | Performed by: PSYCHIATRY & NEUROLOGY

## 2025-07-21 PROCEDURE — 1160F RVW MEDS BY RX/DR IN RCRD: CPT | Mod: CPTII,NTX,S$GLB, | Performed by: PSYCHIATRY & NEUROLOGY

## 2025-07-21 PROCEDURE — 99999 PR PBB SHADOW E&M-EST. PATIENT-LVL II: CPT | Mod: PBBFAC,TXP,, | Performed by: PSYCHIATRY & NEUROLOGY

## 2025-07-21 PROCEDURE — 3074F SYST BP LT 130 MM HG: CPT | Mod: CPTII,NTX,S$GLB, | Performed by: PSYCHIATRY & NEUROLOGY

## 2025-07-21 NOTE — PATIENT INSTRUCTIONS
PLAN:     Follow Up Oct 6 2025 @ 2 pm IN CLINIC    Psyc meds:   Seroquel 50 mg  taking as // HALF tab TWICE DAILY   Remeron 7.5 mg at bed #90 x1    Continues to see   Margaret Walton LCSW as doing weekly     References:     Relaxation stress reduction workbook: ABBY Freeman PhD ( used: $7-10)    Feeling Good Website: Danis Hernandez MD / www.[employer] website (free) / chintan. [employer]    Anxiety &  phobia workbook by AHMET Oneal PhD  (web retailers: used: $ 7-10)    [place]: Path to Better Sleep : https://www.veter[employer]raining.[place].gov[place]insomnia/ (free)     Pt expressed appreciation for the visit today and did not have further question at this time though pt  was still informed to:     Call  if problems.    Call / Report Side Effects to Psyc MD     Encouraged to follow up with primary care / Gen Med MD for continued monitoring of general health and wellness.    Understanding was expressed; and no further concerns nor questions were raised at this time.     Remember healthy self care:   eat right  attempt adequate rest   HANDWASHING / encourage such chintan. During this corona virus time   walk or light exercise within reason and as your general med team approves  read or explore any of reference materials / homework mentioned  reach out (I.e.,  connect with)  others who nuture and bring out best in you  avoid risky behaviors    Keep your appointments:    IF you  cannot make your appt THEN please call [phone]  or go online (via [employer]) to reschedule.    It is the responsibility of the patient to reschedule an appointment if an appointment has been canceled or missed.    Avoid  alcohol and illicit substances.  Look for the positive.  All is often relative-seek balance  Call sooner if needed : [phone]  Call 601 or go to Emergency Room  (ER)  if  any acute concerns

## 2025-07-21 NOTE — PROGRESS NOTES
"    Jessica Floyd   2025     Disclaimer: Evaluation and treatment is based on information presented to date. Any new information may affect assessment and findings.     The patient location is: In Clinic 2025     Total Time: 41 min (date 25) which includes face to face time and non-face to face time includin)  preparing to see the patient (eg, review of tests), 2) Obtaining and/or reviewing separately obtained history from other medical disciplines; 3) Documenting clinical information in the electronic or other health record, 4) Independently reviewing and interpreting lab and/ or test results as well as 5)  care coordination as necessary.       in wheelchair     S: Patient's Own Perception of Condition (& Side Effects) :  Says more recently she has not been able to walk at home; dealing vascular issues and legs    O:      CURRENT PRESENTATION:     On July 15 / MD spoke with patient about cutting her Seroquel dose in half after being placed on tramadol.  Phone call was made telephone entry was made such did seem to help her; says going well says she is only taking the tramadol intermittently anyway    on July 15 I offer her this spot on my schedule to check on how things were goingSo that is good news that she is feeling that her Seroquel dose adjustment has worked out    Was also on her mind is:  The blockages in her legs and this secondary lesions or blisters that have occurred due to vascular insufficiency; [facility]    Relays was upset being told "advanced lung disease" tho has been told emphysema for some time.    I did read note from MD that states should remain same if remain off smoking     remeron 7.5 mg was added for depression and as ancillary aspect to help here (poosible with some wt gain / as she was struggling to keep wt on); and she happy to report has not been losing weight / maintaining      went to " [place] for few weeks where his family is from; he reports he had a great time seeing family ;  Ms Floyd  wanted to give him a break from having care and he greatly enjoyed trip [facility]    Excerpt 11-13-24 note of Hina Roberts DO of [facility] team:     Jessica Floyd is a 74 y.o. female who is on  oxygen.  She is on no assisted ventilation.  Her [place] Heart Association Class is II and a Karnofsky score of 90% - Able to carry on normal activity: minor symptoms of disease. She is not diabetic.     Here today for routine follow-up.  Since her last visit, she has been doing fair.  Currently she feels poor due to allergies and post nasal gtt.  She takes antihistamines and flonase.  No reflux.  Takes Trelegy.  No prn albuterol.  Remains as active as she can be.  Her biggest complaint continues to be around her weight loss.  Her weight is currently stable but states over the last year, she has lost a significant amount of weight.  Follows with GI and hematology.  Continues to drink protein shakes.  Anxiety somewhat controlled.             Excerpt 2-28-25 note of note of Hina Roberts,  of [facility] team:     Pt presents for follow-up of her COPD.  She has COPD with PH on TTE.  She was hospitalized in December for acute on chronic hypoxic RF 2/2 influenza.  She recovered and now feels back to her baseline.  She takes her inhalers as directed.  Has an albuterol prn inhaler but does not need to use it.  Remains active.  Does not have hypercapnea or require NIPPV.  She would like testing to see how much O2 she needs as she is planning a trip and does not have a portable concentrator to travel with.  She also endorses panic attacks and anxiety/PTSD over medical traumas in the past.  Has been trying to establish with psych without any luck.  Overall, feels at her baseline.       Plan    Followed for COPD.  On Trelegy.  Tolerating well.  Is at baseline following multiple hospitalizations for GI  "issues.  TTE with ePAP of 40mmHg likely related to hyperinflation from emphysema.  Qualified for oxygen today and discussed re-prescribing oxygen which she is agreeable to.    Requires 2L oxygen with exertion on walk test today.  TTE with ePAP of 40mmHg.      Continue with allergy regimen.         Has anxiety at baseline and feels like she has her own maneuvers to help with calming.  She was upset over using terms "advanced lung disease" today in relation to why it is difficult to gain weight.  Explained that this may be the first time she has heard the term but that she has had severe emphysema for as long as I have seen her.  Explained that with ongoing smoking cessation, her lung disease is likely to stay stable.      Continue with protein supplementation for weight loss.  Explained that given her multiple hospital admissions and severe emphysema, she has reasons to have weight loss.  Her daughter is concerned about a malabsorption process and possible malignancy.  I will reach out to her GI and hematologist just to relay concerns.        Follow-up in 3 months with PFTs and 6MWT.        >>    Goal directed    Calm though residual anxiety about her pulmonary and vascular issues    No SI  no HI    No Psychosis    Mood: ok     Affect:   shows range    Psyc  Medication:     Seroquel 50 mg tab taking as HALF morning and HALF evening    Referred in to [facility] on Seroquel  /noted off label use THO she is adamant that such is WORKING / largely eliminated spells she was having; denies lightereded / falls; risk benefit discussed and told read package insert and IF any questions to ask Psyc MD /     Constitutional Health Concerns: had GI adhesions tho cleared    Vitals:    07/21/25 1003   BP: 117/66   Pulse: 91        Wt Readings from Last 3 Encounters:   07/21/25 49.9 kg (110 lb)   07/16/25 49.9 kg (110 lb 0.2 oz)   07/08/25 49.9 kg (110 lb)      Laboratory Data  Lab Visit on 07/08/2025   Component Date Value Ref Range " Status    Sodium 07/08/2025 137  136 - 145 mmol/L Final    Potassium 07/08/2025 3.5  3.5 - 5.1 mmol/L Final    Chloride 07/08/2025 99  95 - 110 mmol/L Final    CO2 07/08/2025 25  23 - 29 mmol/L Final    Glucose 07/08/2025 102  70 - 110 mg/dL Final    BUN 07/08/2025 11  8 - 23 mg/dL Final    Creatinine 07/08/2025 0.9  0.5 - 1.4 mg/dL Final    Calcium 07/08/2025 9.5  8.7 - 10.5 mg/dL Final    Protein Total 07/08/2025 8.2  6.0 - 8.4 gm/dL Final    Albumin 07/08/2025 3.0 (L)  3.5 - 5.2 g/dL Final    Bilirubin Total 07/08/2025 0.8  0.1 - 1.0 mg/dL Final    ALP 07/08/2025 85  40 - 150 unit/L Final    AST 07/08/2025 25  11 - 45 unit/L Final    ALT 07/08/2025 11  10 - 44 unit/L Final    Anion Gap 07/08/2025 13  8 - 16 mmol/L Final    eGFR 07/08/2025 >60  >60 mL/min/1.73/m2 Final    PATHOLOGIST REVIEW - CBC/DIFF ([facility]) 07/08/2025    Final                    Value:Examination of the peripheral blood smear reveal normocytic erythrocytes with polychromasia. Leukocytosis with left shift are seen. No blasts are identified. Platelets are increased in numbers and demonstrate a normal morphology.           Interpreting Pathologist: Marianela Centeno MD        WBC 07/08/2025 15.98 (H)  3.90 - 12.70 K/uL Final    RBC 07/08/2025 3.22 (L)  4.00 - 5.40 M/uL Final    HGB 07/08/2025 8.9 (L)  12.0 - 16.0 gm/dL Final    HCT 07/08/2025 30.4 (L)  37.0 - 48.5 % Final    MCV 07/08/2025 94  82 - 98 fL Final    MCH 07/08/2025 27.6  27.0 - 31.0 pg Final    MCHC 07/08/2025 29.3 (L)  32.0 - 36.0 g/dL Final    RDW 07/08/2025 21.8 (H)  11.5 - 14.5 % Final    Platelet Count 07/08/2025 482 (H)  150 - 450 K/uL Final    MPV 07/08/2025 10.0  9.2 - 12.9 fL Final    Nucleated RBC 07/08/2025 0  <=0 /100 WBC Final    Neut % 07/08/2025 62.3  38 - 73 % Final    Lymph % 07/08/2025 10.4 (L)  18 - 48 % Final    Mono % 07/08/2025 21.6 (H)  4 - 15 % Final    Eos % 07/08/2025 2.5  <=8 % Final    Basophil % 07/08/2025 0.4  <=1.9 % Final    Imm Grans % 07/08/2025 2.8 (H)  0.0 -  "0.5 % Final    Neut # 07/08/2025 9.95 (H)  1.8 - 7.7 K/uL Final    Lymph # 07/08/2025 1.66  1 - 4.8 K/uL Final    Mono # 07/08/2025 3.45 (H)  0.3 - 1 K/uL Final    Eos # 07/08/2025 0.40  <=0.5 K/uL Final    Baso # 07/08/2025 0.07  <=0.2 K/uL Final    Imm Grans # 07/08/2025 0.45 (H)  0.00 - 0.04 K/uL Final    WBC 07/08/2025 16.30 (H)  3.90 - 12.70 K/uL Final    RBC 07/08/2025 3.25 (L)  4.00 - 5.40 M/uL Final    HGB 07/08/2025 8.8 (L)  12.0 - 16.0 gm/dL Final    HCT 07/08/2025 30.6 (L)  37.0 - 48.5 % Final    MCV 07/08/2025 94  82 - 98 fL Final    MCH 07/08/2025 27.1  27.0 - 31.0 pg Final    MCHC 07/08/2025 28.8 (L)  32.0 - 36.0 g/dL Final    RDW 07/08/2025 21.7 (H)  11.5 - 14.5 % Final    Platelet Count 07/08/2025 496 (H)  150 - 450 K/uL Final    MPV 07/08/2025 10.1  9.2 - 12.9 fL Final    Nucleated RBC 07/08/2025 0  <=0 /100 WBC Final    Gran # (ANC) 07/08/2025 11.2  K/uL Final    Segmented Neutrophil % 07/08/2025 67.0  38.0 - 73.0 % Final    Bands % 07/08/2025 2.0  % Final    Lymphocyte % 07/08/2025 10.0 (L)  18.0 - 48.0 % Final    Monocyte % 07/08/2025 17.0 (H)  4.0 - 15.0 % Final    Eosinophil % 07/08/2025 2.0  0.0 - 8.0 % Final    Metamyelocyte % 07/08/2025 2.0  % Final    Platelet Estimate 07/08/2025 Appears Normal    Final    Anisocytosis 07/08/2025 Slight   Final    Poik 07/08/2025 Slight   Final    Ovalocytes 07/08/2025 Occasional   Final    Stomatocytes 07/08/2025 Present   Final    Toxic Gran 07/08/2025 Present   Final      Mental Status Exam:   Appearance: casual / in wheelchair / walks around house ok   Oriented: x 3   Attitude: cooperative   Eye Contact: good   Behavior: sits calm in wheel chair   Mood: says ok"  Cognition: alert  Concentration: grossly intact   Affect: appropriate range   Anxiety: moderate  Thought Process: goal directed   Speech: Volume : WNL   Quantity WNL   Quality: appears to openly answer questions   Eye Contact: good   Threats: no SI / no HI   Memory: intact (as relay " information across time spans)  Psychosis: denies all   Estimate of Intellectual Function: upper average   Impulse Control: no history SI / nor HI ; calm here     Musculoskeletal:      Pain Level: R shoulder 9 of 10  / gets  injection about q 3 months / L leg ankle 12 of 10 soon to get injection /      Social:  x 56yrs [place] (country [place]) / \She has previously spoke about being  56 yrs /   ([place]) is from country of [place]; pt recounts how they went to [place] to get  because he was 18 years old; she was 17; says back then  in [place] ; a man had to be 20 yr old    Patient Active Problem List   Diagnosis    Coronary artery disease involving native coronary artery of native heart without angina pectoris    Bilateral carotid artery stenosis    Gastroesophageal reflux disease without esophagitis    CKD stage G3a/A1, GFR 45-59 and albumin creatinine ratio <30 mg/g    Hypokalemia    Hypertension    Subclinical hyperthyroidism    Allergic rhinitis    Iron deficiency anemia secondary to blood loss (chronic)    History of fracture of left hip    Leukocytosis    Acute blood loss anemia    Malnutrition of mild degree    Pathological fracture due to osteoporosis    Osteoporosis    Localized osteoporosis of [place]    Impaired mobility    Reduced vision-Left eye    Gout    Right rotator cuff tear arthropathy    SOB (shortness of breath)    Eosinophilia    Anxiety    PAC (premature atrial contraction)    Aortic atherosclerosis    Pulmonary emphysema    Weight loss    Moderate malnutrition    (HFpEF) heart failure with preserved ejection fraction    History Situational (ie Stress Induced) syncope ([facility] 12-25-23    Nicotine dependence, cigarettes, in FULL REMISSION:in past smoked for 17yrs from 16y to about 34y / LUPE:F pack a day    Acute GI bleeding    Ischemia, bowel    Hypernatremia    Incarcerated inguinal hernia    Left leg pain    Abrasion    Comfort measures only  status    Localized swelling of left lower extremity    Thrombocytosis    Monocytosis    Hypomagnesemia    Body mass index (BMI) 19.9 or less, adult    Acute cystitis without hematuria    Aortoiliac occlusive disease    PAD (peripheral artery disease)    Palliative care encounter    Symptomatic anemia    Non healing left heel wound    Critical limb ischemia of left lower extremity          Current Outpatient Medications:     allopurinoL (ZYLOPRIM) 300 MG tablet, Take 1 tablet (300 mg total) by mouth once daily., Disp: 90 tablet, Rfl: 0    aspirin (ECOTRIN) 81 MG EC tablet, Take 1 tablet (81 mg total) by mouth once daily., Disp: 30 tablet, Rfl: 11    atorvastatin (LIPITOR) 80 MG tablet, TAKE 1 TABLET BY MOUTH EVERY DAY, Disp: 90 tablet, Rfl: 3    [Paused] carvediloL (COREG) 6.25 MG tablet, Take 1 tablet (6.25 mg total) by mouth 2 (two) times daily., Disp: 180 tablet, Rfl: 3    ciclopirox (PENLAC) 8 % Soln, Apply topically nightly., Disp: 6.6 mL, Rfl: 11    colchicine (COLCRYS) 0.6 mg tablet, TAKE 1 TABLET (0.6 MG TOTAL) BY MOUTH ONCE DAILY. AS NEEDED FOR GOUT, Disp: 90 tablet, Rfl: 3    cyanocobalamin 1,000 mcg/mL injection, Inject 1,000 mcg into the muscle every 7 days., Disp: 10 mL, Rfl: 5    fluticasone propionate (FLONASE) 50 mcg/actuation nasal spray, 2 sprays (100 mcg total) by Each Nostril route once daily., Disp: , Rfl:     fluticasone-umeclidin-vilanter (TRELEGY ELLIPTA) 200-62.5-25 mcg inhaler, Inhale 1 puff into the lungs once daily., Disp: 60 each, Rfl: 11    furosemide (LASIX) 20 MG tablet, TAKE 1 TABLET (20 MG TOTAL) BY MOUTH 2 (TWO) TIMES DAILY AS NEEDED (SWELLING, WEIGHT GAIN, DYSPNEA)., Disp: 180 tablet, Rfl: 1    gabapentin (NEURONTIN) 300 MG capsule, Take 1 capsule (300 mg total) by mouth 3 (three) times daily., Disp: 90 capsule, Rfl: 0    LIDOcaine 4 % Gel, Apply 1 g topically 2 (two) times daily as needed (foot pain)., Disp: , Rfl:     LIDOcaine-prilocaine (EMLA) cream, Apply topically as  needed., Disp: 30 g, Rfl: 3    mirtazapine (REMERON) 7.5 MG Tab, Take 1 tablet (7.5 mg total) by mouth every evening., Disp: 90 tablet, Rfl: 1    montelukast (SINGULAIR) 10 mg tablet, Take 10 mg by mouth every evening., Disp: , Rfl:     ondansetron (ZOFRAN-ODT) 8 MG TbDL, Take 1 tablet (8 mg total) by mouth every 6 (six) hours as needed (nausea)., Disp: 30 tablet, Rfl: 1    pantoprazole (PROTONIX) 40 MG tablet, TAKE 1 TABLET BY MOUTH TWICE A DAY, Disp: 180 tablet, Rfl: 3    QUEtiapine (SEROQUEL) 50 MG tablet, Take 1 tablet (50 mg total) by mouth 2 (two) times daily., Disp: 180 tablet, Rfl: 1    syringe, disposable, 1 mL Syrg, 1 Stick by Misc.(Non-Drug; Combo Route) route once a week., Disp: 25 each, Rfl: 1    tiotropium bromide (SPIRIVA RESPIMAT) 2.5 mcg/actuation inhaler, INHALE 2 PUFFS BY MOUTH INTO THE LUNGS DAILY, Disp: 4 g, Rfl: 2    traMADoL (ULTRAM) 50 mg tablet, Take 1 tablet (50 mg total) by mouth every 8 (eight) hours as needed for Pain (breakthrough pain)., Disp: 30 tablet, Rfl: 0  No current facility-administered medications for this visit.    Facility-Administered Medications Ordered in Other Visits:     mupirocin 2 % ointment, , Nasal, On Call Procedure, Kang Diaz MD, Given at 10/25/23 1045    tranexamic acid (CYKLOKAPRON) 1,000 mg in sodium chloride 0.9 % 100 mL IVPB (MB+), 1,000 mg, Intravenous, Once, Kang Diaz MD    tranexamic acid (CYKLOKAPRON) 1,000 mg in sodium chloride 0.9 % 100 mL IVPB (MB+), 1,000 mg, Intravenous, Once, Kang Diaz MD     Social History     Tobacco Use   Smoking Status Former    Current packs/day: 0.00    Average packs/day: 0.5 packs/day for 20.0 years (10.0 ttl pk-yrs)    Types: Cigarettes    Start date: 1979    Quit date: 1999    Years since quittin.5    Passive exposure: Past   Smokeless Tobacco Never        Review of patient's allergies indicates:   Allergen Reactions    Hydromorphone Anxiety, Other (See Comments) and Hallucinations      Severe agitation       ASSESSMENT:   Encounter Diagnoses   Name Primary?    Anxiety, unspecified, tho prominent generalized features (ie, worrier / chintan whenexacerbated by flare ups of  health conditions Yes    Aortoiliac occlusive disease     Pulmonary emphysema, unspecified emphysema type     History Situational (ie Stress Anxiety Induced) REFLEX SYNCOPE syncope ([facility] admission 12-25-23     Nicotine dependence, cigarettes, in FULL REMISSION:in past smoked for 18yrs from 16y to about 34y / LUPE:F pack a day     Subclinical hyperthyroidism     Heart failure with preserved ejection fraction, unspecified HF chronicity     CKD stage G3a/A1, GFR 45-59 and albumin creatinine ratio <30 mg/g        At today's appt,  MD addressed long term clinical issues (anxiety) that are of  Increased Complexity. Note  Collateral issues impacting such issue(s) include : multiple medical ( include in part: , emphysema history GI bleed, mobility issues, BMI below 19 other as per prob list);  . This MD  1)personally  saw patient ;  obtained additional history ; and 2) reviewed notes of collateral providers (where appropriate).  3) MD assessed long term complex condition(s); 4) discussed with pt; and 5) made treatment decisions in concert with pt (including: med mngt and/or psychotherapy).     Patient Instructions     PLAN:     Follow Up Oct 6 2025 @ 2 pm IN CLINIC    Psyc meds:   Seroquel 50 mg  taking as // HALF tab TWICE DAILY   Remeron 7.5 mg at bed #90 x1    Continues to see   Margaret Isabelle LCSW as doing weekly     References:     Relaxation stress reduction workbook: ABBY Freeman PhD ( used: $7-10)    Feeling Good Website: Danis Hernandez MD / [email or web address] website (free) / chintan. PODCASTS    Anxiety &  phobia workbook by AHMET Oneal PhD  (web retailers: used: $ 7-10)    [place]: Path to Better Sleep : https://www.veterantraining.va.gov/insomnia/ (free)     Pt expressed appreciation for the visit today and did not have further question  "at this time though pt  was still informed to:     Call  if problems.    Call / Report Side Effects to Psyc MD     Encouraged to follow up with primary care / Gen Med MD for continued monitoring of general health and wellness.    Understanding was expressed; and no further concerns nor questions were raised at this time.     Remember healthy self care:   eat right  attempt adequate rest   HANDWASHING / encourage such chintan. During this corona virus time   walk or light exercise within reason and as your general med team approves  read or explore any of reference materials / homework mentioned  reach out (I.e.,  connect with)  others who nuture and bring out best in you  avoid risky behaviors    Keep your appointments:    IF you  cannot make your appt THEN please call [phone]  or go online (via My Chart pedro) to reschedule.    It is the responsibility of the patient to reschedule an appointment if an appointment has been canceled or missed.    Avoid  alcohol and illicit substances.  Look for the positive.  All is often relative-seek balance  Call sooner if needed : [phone]  Call 911 or go to Emergency Room  (ER)  if  any acute concerns     >>Excerpt from Initial Psyc MD eval from 4-17-24 <<    Jessica Floyd initially presented to psyc clinic as a 73 y.o. female (5' 3" and 116 lbs) sittingin wheelchair.  stayed in wait area tho seems caring well intended.     Pt was referred by [facility] pulmonologist: Alejandra WALLER.      She is a former smoker of 18 yrs (from 16y to 34y).     She has pulmonary emphysema; and reports getting heightened anxiety at times (often assoc with her pulmonary status and such has has led to situational reflex syncope.      Pt says such was originally diag in [place] yrs ago.     Says that she a had recent bout in 12- and [facility],     She does present as "worrier" / and seems to have anticipatory worry. .     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      She has Been  x 57y to [place] HE was director prop [facility] for [facility] [employer]; did that 3 yrs     He retired and did facilities [facility] for credit ag[employer] ; based in [place] 12y and then he went head [facility] ; so moved [facility] x 6yrs/ for a time he was also placed [place] x 3 yrs while she had moved to [place].     Says when gets stressed nervous; she says was called reflex syncope; says has had since diag in [facility] (2015).     Says when was put on seroquel in Feb 2024 when in hospital ; says has not had ANY reflex syncopal    Excerpt of 12-25-23 note of [facility] Hospitalist Earline Hernández MD     * Acute hypoxic respiratory failure  Patient with Hypoxic Respiratory failure which is Acute on chronic. she is on home oxygen at 1L PRN LPM. Supplemental oxygen was provided and noted- Oxygen Concentration (%): [30] 30 Signs/symptoms of respiratory failure include- tachypnea, increased work of breathing, respiratory distress, and wheezing. Contributing diagnoses includes - Aspiration, CHF, COPD, and Pneumonia Labs and images were reviewed. Patient Has not had a recent ABG. Will treat underlying causes and adjust management of respiratory failure as follows     73-year-old female with a past medical history of former smoking, CAD, R Carotid stent, dyslipidemia, HTN, gout, anxiety, prev SAH (1999) COPD on home O2 (1L PRN), mild pulmonary hypertension, stress induced reflex syncope, history of ESBL UTI, now presenting with chief complaint of shortness of breath.  Patient reports that yesterday during the day she experienced worsening shortness of breath and cough.  She has had " "SOB for weeks, possibly months, and a lingering cough since her COVID infection last month but yesterday had an acute change. Xmas eric she was not feeling well, and around 6 or 7 pm she called ambulence. After some SOB at home and 3L didn't help. Was observed by  to be satting 81 or 82. Baseline is 94 or 95. Patient denies any hemoptysis, fevers, or sick contacts. Admits to SAMPSON, productive gray cough, chest tightness with exertion improved with rest, leg swelling, poor exercise tolerance, and balance issues. Reports weight loss of 20-30 lbs since 6 months. Vaxed for flu rsv, pna, and covid. Follows with pulm OP. Recent hospitalization 1 month ago for Covid.  claims pt "does not walk at all" and gets SOB after walking to the restroom. Uses asistance from others or cane / walker to ambulate.      EMS reports that patient had saturation of 80% on room air requiring 3 L nasal cannula.  Albuterol neb was given by EMS in addition to 125 mg of methylprednisone. In the Ed at presentation her vitals were: /100 P116 RR22 98.5F O2% 93, Initally requiring Bipap, now satting 97 with 8L NC s/p breathing treatment. WBC 31.6 H &H 10.7 33.1  Na 133 K 2.9 CO2 20 Albumin 2.6  Trop WNL Lactate WNL Flu A+ PCO2 33 Po 26 PHCO3 21.7 EKG? Sinus Tach around 115, T wave inversions V3-V5, II, III, AVF, Xray: Patchy right basilar consolidation may relate to infection/pneumonia or aspiration.  Clinical correlation and continued imaging follow-up is recommended. Procal +.      Receive Rocephin, Azithro, Mag, and Steroids in ED. Admitted to 3 for management of Acute Hypoxic Respiratory Failure.         Plan  Q4Hr Breathing treatments  CAP coverage with Ceftriaxone and Azithro  Tamiflu for influenza   Lasix for volume overload 2/2 HFpEF  Continue home inhalers  Prednisone 40 mg qd  Ween O2 as needed      No psychosis     No SI no HI     No evidence of rekha     No substance use     Denies prior behavioral health " "treatment / says no counseling nor seeing psyc providers / did not santo on me     Past Psychiatric History:      Previous psychiatric treatment and medication trials: comes in on SEROQUEL 50 mg  Previous psychiatric hospitalizations: No  Previous diagnoses: No  Previous suicide attempts: No     Abuse History:   Physical Abuse: No  Sexual Abuse: No  Other Abuse / Trauma : No     Substance Abuse History:  Use of alcohol: one glass a day / no control issues  Tobacco use: FORMER SMOKER about 18yrs from 16y to about 34y / smoked LUPE:F pack a day  Cannabis: no  Recreational drugs: none     Family History Mental Health:   Suicide : No  Other: No     Weapons: No     Psyc Meds:   Seroquel 50 mg at bed     Top 3 Stressors: health pulmonary (says we koffi)      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 Mini seizure and pass out / reflux syncope     History Head Injury? N tho has clip for aneurysm    3 wishes ? Health / stamina  Psychosocial History :     City Born: [facility]     Siblings (full or half)  Brothers: 1 who passed 67y health issues  Sisters: none     Parents :     Briefly Describe your Mom: hard working uneducated tho wished she could have been educated worked 2-3 / she was from [place]  Briefly Describe your Dad: [employer] / he was from [place] / (pt sponsored him for citizen when she 17y ; never abusive ; he was kind loving     Bio Mom: Occupation:  /     Bio Dad: Occupation: in [place]     Marital Status: one time / 57 yrs     Children   Girls (ages): three Hannah (54y / outside [place]/ psyc ); Aby (49y [employer] 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 none  DWI ?n  snf time? n     Ever homeless? n     Employment:   Last Job? As below  Longest Job? 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

## 2025-07-21 NOTE — TELEPHONE ENCOUNTER
No care due was identified.  [facility] Embedded Care Due Messages. Reference number: [id number].   7/21/2025 7:46:24 AM CDT

## 2025-07-22 RX ORDER — TRAMADOL HYDROCHLORIDE 50 MG/1
50 TABLET, FILM COATED ORAL EVERY 8 HOURS PRN
Qty: 30 TABLET | Refills: 0 | Status: SHIPPED | OUTPATIENT
Start: 2025-07-22

## 2025-07-24 ENCOUNTER — OFFICE VISIT (OUTPATIENT)
Dept: GASTROENTEROLOGY | Facility: CLINIC | Age: 75
End: 2025-07-24
Payer: MEDICARE

## 2025-07-24 ENCOUNTER — TELEPHONE (OUTPATIENT)
Dept: PALLIATIVE MEDICINE | Facility: CLINIC | Age: 75
End: 2025-07-24
Payer: MEDICARE

## 2025-07-24 ENCOUNTER — OFFICE VISIT (OUTPATIENT)
Dept: RHEUMATOLOGY | Facility: CLINIC | Age: 75
End: 2025-07-24
Payer: MEDICARE

## 2025-07-24 ENCOUNTER — PATIENT MESSAGE (OUTPATIENT)
Dept: INTERNAL MEDICINE | Facility: CLINIC | Age: 75
End: 2025-07-24
Payer: MEDICARE

## 2025-07-24 VITALS
DIASTOLIC BLOOD PRESSURE: 73 MMHG | HEART RATE: 88 BPM | SYSTOLIC BLOOD PRESSURE: 124 MMHG | BODY MASS INDEX: 19.49 KG/M2 | WEIGHT: 110 LBS

## 2025-07-24 DIAGNOSIS — M10.9 GOUT, UNSPECIFIED CAUSE, UNSPECIFIED CHRONICITY, UNSPECIFIED SITE: Primary | ICD-10-CM

## 2025-07-24 DIAGNOSIS — S91.302A NON HEALING LEFT HEEL WOUND: ICD-10-CM

## 2025-07-24 DIAGNOSIS — R11.0 NAUSEA: Primary | ICD-10-CM

## 2025-07-24 DIAGNOSIS — K21.9 GASTROESOPHAGEAL REFLUX DISEASE WITHOUT ESOPHAGITIS: ICD-10-CM

## 2025-07-24 DIAGNOSIS — I73.9 PAD (PERIPHERAL ARTERY DISEASE): ICD-10-CM

## 2025-07-24 PROCEDURE — 1159F MED LIST DOCD IN RCRD: CPT | Mod: CPTII,NTX,S$GLB, | Performed by: INTERNAL MEDICINE

## 2025-07-24 PROCEDURE — 3074F SYST BP LT 130 MM HG: CPT | Mod: CPTII,NTX,S$GLB, | Performed by: INTERNAL MEDICINE

## 2025-07-24 PROCEDURE — 1160F RVW MEDS BY RX/DR IN RCRD: CPT | Mod: CPTII,NTX,S$GLB, | Performed by: INTERNAL MEDICINE

## 2025-07-24 PROCEDURE — 1101F PT FALLS ASSESS-DOCD LE1/YR: CPT | Mod: CPTII,NTX,S$GLB, | Performed by: INTERNAL MEDICINE

## 2025-07-24 PROCEDURE — 1125F AMNT PAIN NOTED PAIN PRSNT: CPT | Mod: CPTII,NTX,S$GLB, | Performed by: INTERNAL MEDICINE

## 2025-07-24 PROCEDURE — G2211 COMPLEX E/M VISIT ADD ON: HCPCS | Mod: NTX,S$GLB,, | Performed by: INTERNAL MEDICINE

## 2025-07-24 PROCEDURE — 99999 PR PBB SHADOW E&M-EST. PATIENT-LVL IV: CPT | Mod: PBBFAC,TXP,, | Performed by: INTERNAL MEDICINE

## 2025-07-24 PROCEDURE — 3078F DIAST BP <80 MM HG: CPT | Mod: CPTII,NTX,S$GLB, | Performed by: INTERNAL MEDICINE

## 2025-07-24 PROCEDURE — 99213 OFFICE O/P EST LOW 20 MIN: CPT | Mod: NTX,S$GLB,, | Performed by: INTERNAL MEDICINE

## 2025-07-24 PROCEDURE — 3288F FALL RISK ASSESSMENT DOCD: CPT | Mod: CPTII,NTX,S$GLB, | Performed by: INTERNAL MEDICINE

## 2025-07-24 RX ORDER — FAMOTIDINE 40 MG/1
40 TABLET, FILM COATED ORAL DAILY
Qty: 90 TABLET | Refills: 3 | Status: SHIPPED | OUTPATIENT
Start: 2025-07-24 | End: 2026-07-24

## 2025-07-24 RX ORDER — LIDOCAINE HYDROCHLORIDE 20 MG/ML
JELLY TOPICAL
COMMUNITY
Start: 2025-06-30

## 2025-07-24 RX ORDER — PANTOPRAZOLE SODIUM 40 MG/1
40 TABLET, DELAYED RELEASE ORAL 2 TIMES DAILY
Qty: 180 TABLET | Refills: 3 | Status: SHIPPED | OUTPATIENT
Start: 2025-07-24

## 2025-07-24 RX ORDER — FLUTICASONE FUROATE AND VILANTEROL 100; 25 UG/1; UG/1
1 POWDER RESPIRATORY (INHALATION)
COMMUNITY

## 2025-07-24 ASSESSMENT — ROUTINE ASSESSMENT OF PATIENT INDEX DATA (RAPID3)
FATIGUE SCORE: 4
WHEN YOU AWAKENED IN THE MORNING OVER THE LAST WEEK, PLEASE INDICATE THE AMOUNT OF TIME IT TAKES UNTIL YOU ARE AS LIMBER AS YOU WILL BE FOR THE DAY: 30 MINUTES
PSYCHOLOGICAL DISTRESS SCORE: 2.2
PATIENT GLOBAL ASSESSMENT SCORE: 6.5
MDHAQ FUNCTION SCORE: 0.8
AM STIFFNESS SCORE: 1, YES
TOTAL RAPID3 SCORE: 5.39
PAIN SCORE: 7

## 2025-07-24 NOTE — PROGRESS NOTES
GASTROENTEROLOGY CLINIC NOTE    The patient location is: [place]  The chief complaint leading to consultation is: Follow up reflux, nausea, and medication refill    Visit type: audiovisual    Face to Face time with patient: 6:16  20 minutes of total time spent on the encounter, which includes face to face time and non-face to face time preparing to see the patient (eg, review of tests), Obtaining and/or reviewing separately obtained history, Documenting clinical information in the electronic or other health record, Independently interpreting results (not separately reported) and communicating results to the patient/family/caregiver, or Care coordination (not separately reported).         Each patient to whom he or she provides medical services by telemedicine is:  (1) informed of the relationship between the physician and patient and the respective role of any other health care provider with respect to management of the patient; and (2) notified that he or she may decline to receive medical services by telemedicine and may withdraw from such care at any time.      Chief Complaint: The primary encounter diagnosis was Nausea. A diagnosis of Gastroesophageal reflux disease without esophagitis was also pertinent to this visit.  Referring provider/PCP: Jace Valencia MD    Initial HPI   Jessica Floyd is a 74 y.o. female who is a new patient to me who presents for reflux and epigastric pain. She is accompanied by her . Patient hospitalized 12/2023 for SOB, Flu A, and COPD exacerbation.   During hospitalization had episode of chest pain with feeling of food caught in throat. Patient and  state this only happens when she is hospitalized. Does not occur at home. She takes Protonix 40mg daily.    When this occurred in hospital, she was prescribed reglan PRN and given GI cocktail which helped symptoms considerably. She had no further episodes during hospitalization and has not had any since discharge.   She  has no complaints at this time. Appetite has improved since discharge and weight is improving.     Reflux: No. Controlled with daily Protonix.  Dysphagia/Odynophagia: No  Nausea/Vomiting: No  Appetite Changes: No  Abdominal Pain: No  Bowel Habits: regular bowel movements. No change in bowel habits.  GI Bleeding: Denies hematochezia, hematemesis, melena, BRBPR, Black/tarry stool, coffee ground emesis  Unexplained Weight Loss: No   ___________________________________________    Interval Note 8/19/2024    Ms. Floyd who is known to me presents via telemedicine encounter for hospital follow up. She is accompanied by her .   Since last office visit, she has been hospitalized twice. 5/2024 prolonged hospital stay for incarcerated inguinal hernia which required exploratory lap for ischemic bowel.   During hospitalization, she underwent EGD/Colonoscopy. EGD significant for 2 cm sliding hiatal hernia. Protonix BID controlling heartburn/reflux.   Colonoscopy with looping and redundant colon; aborted due to poor prep. Diverticula also noted.   Did well after discharge until 7/2024 when she developed abdominal pain, nausea, vomiting, and abdominal distension. CT concerning for SBO. She was managed conservatively and passed gastrograffin challenge.   Has been doing well since discharge. Bowel movements daily to every other day. She is watching her diet; taking in 1500 calories a day. Family is concerned about weight loss.     _____________________________________________    Interval Note 9/16/2024    Ms. Floyd presents to clinic for follow up. She is accompanied by her .   Protonix controlling reflux.  Doing well since last office visit. Bowel movements daily.   Weight is stable. Appetite has returned. She has no GI complaints at this time.     _________________________________________________  Interval Note 7/24/2025    Ms. Floyd who is known to me presents via telemedicine encounter for follow up regarding acid  reflux and nausea.   Infusion RN reached out to me last week while patient receiving her iron infusion and informed me that Ms. Floyd was c/o nausea and reflux.   Zofran sent to pharmacy which is helping with nausea.   She ran out of Protonix last week prior to receiving infusion. She has been taking Protonix three times a day to control her reflux.   Appetite good. No overt signs of blood in stool. Denies vomiting.     GLP-1s: No  NSAIDs: No  Anticoagulation or Antiplatelet: No    History of H.pylori:  H.pylori Treatment:  Prior Upper Endoscopy: 5/2025 with Dr. Syed  Impression:            - Normal esophagus.                          - Normal gastric body and antrum.                          - Normal examined duodenum.                          - No specimens collected.     5/2024 with Dr. Syed  Impression:            - Normal esophagus.                          - 2 cm type-I sliding hiatal hernia.                          - Normal gastric body and antrum.                          - Normal examined duodenum.                          - No specimens collected.     Prior Colonoscopy:  5/2025 with Dr. Syed  Impression:            - Diverticulosis in the sigmoid colon and in the                          descending colon.                          - One bleeding colonic angioectasia. Treated with                          argon plasma coagulation (APC).                          - One 10 mm polyp in the ascending colon.                          Resection not attempted.                          - One non-bleeding colonic angioectasia. Treated                          with argon plasma coagulation (APC).                          - The examination was otherwise normal on direct                          and retroflexion views.                          - No specimens collected.                          - Increased procedural services (modifier .22) due                          to difficulty and complexity requiring use of                           specialized equipment.     5/2024 with Dr. Syed  Impression:            - Preparation of the colon was poor.                          - Clotted blood in the entire examined colon.                          - Diverticulosis in the sigmoid colon, in the                          descending colon and in the transverse colon.                          - The examination was otherwise normal.                          - No specimens collected.                          - Very difficult colon to maneuver with                          significant looping complicated by the presence of                          clots that could not be cleared.     Recommendation:   - I do not feel that repeat colonoscopy is in her                          best interest at this time given the difficulty of                          her colon and inability to advance to the cecum                          (risks outweigh benefits at this time). Monitor                          for signs of active bleeding and suggest repeat                          CTA for if there is evidence of bleeding.                         - No recommendation at this time regarding repeat                          colonoscopy.     Family h/o Colon Cancer: No  Family h/o Crohn's Disease or Ulcerative Colitis: No  Family h/o Celiac Sprue: No  Abdominal Surgeries: 5/17/2024 small bowel resection      Review of Systems   Constitutional:  Negative for weight loss.   HENT:  Negative for sore throat.    Eyes:  Negative for blurred vision.   Respiratory:  Negative for cough.    Cardiovascular:  Negative for chest pain.   Gastrointestinal:  Positive for heartburn and nausea. Negative for abdominal pain, blood in stool, constipation, diarrhea, melena and vomiting.   Genitourinary:  Negative for dysuria.   Musculoskeletal:  Negative for myalgias.   Skin:  Negative for rash.   Neurological:  Negative for headaches.   Endo/Heme/Allergies:  Negative for environmental  allergies.   Psychiatric/Behavioral:  Negative for suicidal ideas. The patient is not nervous/anxious.        Past Medical History: has a past medical history of Allergic rhinitis, Amblyopia, Carotid stenosis, Coronary atherosclerosis, COVID-19, Dyslipidemia, ESBL (extended spectrum beta-lactamase) producing bacteria infection, Hypertension, Influenza A, Nausea and vomiting, Pulmonary emphysema, SAH (subarachnoid hemorrhage), and Subarachnoid hemorrhage due to ruptured aneurysm.    Past Surgical History: has a past surgical history that includes Anterior cruciate ligament repair (2012); Dental surgery; Cerebral aneurysm repair (1999); Tonsillectomy; Adenoidectomy; Appendectomy; Cataract extraction w/  intraocular lens implant (Right, 11/07/2022); Cataract extraction w/  intraocular lens implant (Left, 11/21/2022); Eye surgery (Bilateral); Hip Arthroplasty (Left, 05/28/2023); Esophagogastroduodenoscopy (N/A, 5/15/2024); Colonoscopy (N/A, 5/16/2024); Diagnostic laparoscopy (N/A, 5/17/2024); laparotomy, exploratory (N/A, 5/17/2024); excision, small intestine (N/A, 5/17/2024); repair, hernia, inguinal (Bilateral, 5/17/2024); Esophagogastroduodenoscopy (N/A, 5/28/2025); and colonoscopy, with retrograde balloon enteroscopy, single or double balloon (N/A, 5/29/2025).    Family History:family history includes Alcohol abuse in her father; Aneurysm in her mother; Gout in her brother; Heart disease in her brother; Hypertension in her father and mother; Kidney disease in her brother; No Known Problems in her daughter, daughter, and daughter.    Allergies:   Review of patient's allergies indicates:   Allergen Reactions    Hydromorphone Anxiety, Other (See Comments) and Hallucinations     Severe agitation       Social History: reports that she quit smoking about 26 years ago. Her smoking use included cigarettes. She started smoking about 46 years ago. She has a 10 pack-year smoking history. She has been exposed to tobacco smoke.  She has never used smokeless tobacco. She reports current alcohol use of about 14.0 standard drinks of alcohol per week. She reports that she does not use drugs.    Home medications:   Current Outpatient Medications on File Prior to Visit   Medication Sig Dispense Refill    allopurinoL (ZYLOPRIM) 300 MG tablet Take 1 tablet (300 mg total) by mouth once daily. 90 tablet 0    aspirin (ECOTRIN) 81 MG EC tablet Take 1 tablet (81 mg total) by mouth once daily. 30 tablet 11    atorvastatin (LIPITOR) 80 MG tablet TAKE 1 TABLET BY MOUTH EVERY DAY 90 tablet 3    [Paused] carvediloL (COREG) 6.25 MG tablet Take 1 tablet (6.25 mg total) by mouth 2 (two) times daily. 180 tablet 3    ciclopirox (PENLAC) 8 % Soln Apply topically nightly. 6.6 mL 11    colchicine (COLCRYS) 0.6 mg tablet TAKE 1 TABLET (0.6 MG TOTAL) BY MOUTH ONCE DAILY. AS NEEDED FOR GOUT 90 tablet 3    cyanocobalamin 1,000 mcg/mL injection Inject 1,000 mcg into the muscle every 7 days. 10 mL 5    fluticasone propionate (FLONASE) 50 mcg/actuation nasal spray 2 sprays (100 mcg total) by Each Nostril route once daily.      fluticasone-umeclidin-vilanter (TRELEGY ELLIPTA) 200-62.5-25 mcg inhaler Inhale 1 puff into the lungs once daily. 60 each 11    furosemide (LASIX) 20 MG tablet TAKE 1 TABLET (20 MG TOTAL) BY MOUTH 2 (TWO) TIMES DAILY AS NEEDED (SWELLING, WEIGHT GAIN, DYSPNEA). 180 tablet 1    gabapentin (NEURONTIN) 300 MG capsule Take 1 capsule (300 mg total) by mouth 3 (three) times daily. 90 capsule 0    LIDOcaine 4 % Gel Apply 1 g topically 2 (two) times daily as needed (foot pain).      LIDOcaine-prilocaine (EMLA) cream Apply topically as needed. 30 g 3    mirtazapine (REMERON) 7.5 MG Tab Take 1 tablet (7.5 mg total) by mouth every evening. 90 tablet 1    montelukast (SINGULAIR) 10 mg tablet Take 10 mg by mouth every evening.      ondansetron (ZOFRAN-ODT) 8 MG TbDL Take 1 tablet (8 mg total) by mouth every 6 (six) hours as needed (nausea). 30 tablet 1     QUEtiapine (SEROQUEL) 50 MG tablet Take 1 tablet (50 mg total) by mouth 2 (two) times daily. 180 tablet 1    syringe, disposable, 1 mL Syrg 1 Stick by Misc.(Non-Drug; Combo Route) route once a week. 25 each 1    tiotropium bromide (SPIRIVA RESPIMAT) 2.5 mcg/actuation inhaler INHALE 2 PUFFS BY MOUTH INTO THE LUNGS DAILY 4 g 2    traMADoL (ULTRAM) 50 mg tablet Take 1 tablet (50 mg total) by mouth every 8 (eight) hours as needed for Pain (breakthrough pain). 30 tablet 0    [DISCONTINUED] pantoprazole (PROTONIX) 40 MG tablet TAKE 1 TABLET BY MOUTH TWICE A  tablet 3     Current Facility-Administered Medications on File Prior to Visit   Medication Dose Route Frequency Provider Last Rate Last Admin    mupirocin 2 % ointment   Nasal On Call Procedure Kang Diaz MD   Given at 10/25/23 1045    tranexamic acid (CYKLOKAPRON) 1,000 mg in sodium chloride 0.9 % 100 mL IVPB (MB+)  1,000 mg Intravenous Once Kang Diaz MD        tranexamic acid (CYKLOKAPRON) 1,000 mg in sodium chloride 0.9 % 100 mL IVPB (MB+)  1,000 mg Intravenous Once Kang Diaz MD           Vital signs:  There were no vitals taken for this visit.    Physical Exam  Constitutional:       Appearance: Normal appearance. She is not ill-appearing.      Comments: Limited Physical Exam d/t Telemedicine Encounter   HENT:      Head: Normocephalic.   Pulmonary:      Effort: Pulmonary effort is normal. No respiratory distress.   Neurological:      Mental Status: She is alert and oriented to person, place, and time.   Psychiatric:         Mood and Affect: Mood normal.         Behavior: Behavior normal.         Thought Content: Thought content normal.         Judgment: Judgment normal.         Routine labs:  Lab Results   Component Value Date    WBC 15.98 (H) 07/08/2025    WBC 16.30 (H) 07/08/2025    HGB 8.9 (L) 07/08/2025    HGB 8.8 (L) 07/08/2025    HCT 30.4 (L) 07/08/2025    HCT 30.6 (L) 07/08/2025    MCV 94 07/08/2025    MCV 94 07/08/2025      (H) 07/08/2025     (H) 07/08/2025     Lab Results   Component Value Date    INR 1.0 07/04/2024     Lab Results   Component Value Date    IRON 12 (L) 06/19/2025    FERRITIN 29.0 06/19/2025    TIBC 435 06/19/2025    FESATURATED 22 02/03/2025     Lab Results   Component Value Date     07/08/2025    K 3.5 07/08/2025    CL 99 07/08/2025    CO2 25 07/08/2025    BUN 11 07/08/2025    CREATININE 0.9 07/08/2025     Lab Results   Component Value Date    ALBUMIN 3.0 (L) 07/08/2025    ALT 11 07/08/2025    AST 25 07/08/2025    ALKPHOS 85 07/08/2025    BILITOT 0.8 07/08/2025     Lab Results   Component Value Date    GLUCOSE 78 06/10/2024     Lab Results   Component Value Date    TSH 0.719 10/28/2023     Lab Results   Component Value Date    CALCIUM 9.5 07/08/2025    PHOS 3.4 06/21/2025       Imaging:      I have reviewed prior labs, imaging, and notes.      Assessment:  1. Nausea    2. Gastroesophageal reflux disease without esophagitis      Nausea and reflux recurring since running out of Protonix.   Protonix controlling reflux but she was taking three times daily.   Zofran helping with nausea    Plan:  Orders Placed This Encounter    pantoprazole (PROTONIX) 40 MG tablet    famotidine (PEPCID) 40 MG tablet       Take Protonix BID. Take before breakfast and before dinner  Pepcid once a day. Trial taking at lunchtime instead of Protonix.   Continue zofran as previously prescribed.     Consider switching PPI if reflux is not controlled with Protonix BID and Pepcid    Plan of care discussed with patient who is in agreement and verbalized understanding.     I have explained the planned procedures to the patient.The risks, benefits and alternatives of the procedure were also explained in detail. Patient verbalized understanding, all questions were answered. The patient agrees to proceed as planned    Follow Up: RONAL Jimenez, APRN,FNP-BC  [facility]    (Portions of this  note were dictated using voice recognition software and may contain dictation related errors in spelling/grammar/syntax not found on text review)

## 2025-07-25 ENCOUNTER — PATIENT MESSAGE (OUTPATIENT)
Dept: INTERNAL MEDICINE | Facility: CLINIC | Age: 75
End: 2025-07-25
Payer: MEDICARE

## 2025-07-25 DIAGNOSIS — I25.10 CORONARY ARTERY DISEASE INVOLVING NATIVE CORONARY ARTERY OF NATIVE HEART WITHOUT ANGINA PECTORIS: Primary | Chronic | ICD-10-CM

## 2025-07-25 DIAGNOSIS — I65.23 BILATERAL CAROTID ARTERY STENOSIS: ICD-10-CM

## 2025-07-25 NOTE — TELEPHONE ENCOUNTER
LOV with Jace Valencia MD , 6/30/2025    Patient requesting more in home PT sessions.  Best Call Back:Zahra with [facility]

## 2025-07-25 NOTE — TELEPHONE ENCOUNTER
LOV with Jace Valencia MD , 6/30/2025    Message requesting referral for vascular surgery for second opinion  Order pended for your review    Note dated 6/30/25:  She has an appointment with Dr. Kilpatrick, a vascular surgeon, in 3 weeks to discuss potential interventions for revascularization of the leg. She is scheduled to start iron infusions tomorrow and has been told to continue taking hydrochlorothiazide by the most recent hospital doctor to help with the ankle swelling.

## 2025-07-25 NOTE — PROGRESS NOTES
Palliative Medicine Clinic Note        Consult Requested By: No ref. provider found      Reason for Consult: Symptom management and ACP in the setting of severe emphysema     Chief Complaint: No chief complaint on file.      Care Team: Patient Care Team:  Jace Valencia MD as PCP - General (Internal Medicine)  Michael Lal MD as Consulting Physician (Cardiovascular Disease)  Claribel Gomez LPN as Licensed Practical Nurse      ASSESSMENT/PLAN:      Plan/Recommendations:    There are no diagnoses linked to this encounter.  PAIN/ lower extremity arterial insufficiency:  -Not surgical candidate for revascularization  -left AKA suggested if condition worsens  - Assessed peripheral arterial disease and associated pain, noting limited surgical options based on previous vascular surgeon evaluation.   - Recommend continued physical therapy to improve circulation and maintain muscle strength.   - Patient to use assistive devices and supports at home for safety during mobility.   - Discussed the importance of physical activity and using elliptical machine for 20 minutes every hour as tolerated to improve circulation and potentially aid wound healing.   - Considered pain management strategies, including optimizing current regimen and introducing low-dose opioid for breakthrough pain and activity-related discomfort.   - Educated on the importance of anticipating pain and using medication proactively before activities or events.   - Started Norco (hydrocodone/acetaminophen) 5/325 mg every 6 hours as needed for pain, to be taken 1 hour before physical therapy sessions and before anticipated painful activities (e.g., outings). Can be taken with gabapentin.   - Continued gabapentin, recommending twice daily dosing (morning and night) if 3 times daily causes excessive drowsiness.   - Discontinued tramadol.   - Provided information on the potential side effects of opioid medication, including drowsiness and constipation.    -patient inquired about use of medical marijuana for pain and anxiety  -referral placed to see Dr Glez for medical marijuana consideration  -START:    HYDROcodone-acetaminophen (NORCO) 5-325 mg per tablet 1 tablet, Every 6 hours PRN       Emphysema/COPD:  - Evaluated COPD status, noting improved oxygen recently and current stable condition.     QUALITY OF LIFE AND PALLIATIVE CARE:   - Discussed quality of life goals, focusing on maintaining mobility and independence in activities of daily living.   - Explained the role of palliative care in focusing on quality of life and symptom management.   - Patient to maintain social activities and outings as able, such as attending theater performances.     Advance Care Planning   Advance Directives:   Living Will: No    [place]: No    Do Not Resuscitate Status: No    Medical Power of : No    Agent's Name:  Sandie Floyd (Spouse)   Agent's Contact Number:  [phone] (Home Phone)    Decision Making:  Patient answered questions  Goals of Care: Advance Care Planning    Date: 07/29/2025    [facility]  I engaged the patient and family in a voluntary conversation about advance care planning and we specifically addressed what the goals of care would be moving forward, in light of the patient's change in clinical status, specifically COPD and arterial insuffiencey .  We did not specifically address the patient's likely prognosis.  We explored the patient's values and preferences for future care.  The patient and family endorses that what is most important right now is to focus on spending time at home, remaining as independent as possible, symptom/pain control, extending life as long as possible, even it it means sacrificing quality, and comfort and QOL     Accordingly, we have decided that the best plan to meet the patient's goals includes continuing with treatment    A discussion was held about different medical scenarios and type of treatments that would be available  "ranging from comfort care to aggressive medical treatment.  Encouragement was provided to have conversations with his family about the types of medical treatment he would and would not want in the setting of a critical illness.  The patient endorses that he would like a time limited trial of aggressive medical treatment in the setting of a critical illness with a reasonable chance of a meaningful recovery.    The patient is a F[employer].     - Patient and  express strong desire to maintain quality of life and continue enjoying activities despite health challenges   - Emphasize determination to live life fully, prioritizing time with family, particularly grandchildren   - Made home modifications to accommodate patient's mobility needs and continue to engage in social activities, adapting as necessary   - Patient lives at home with , who acts as primary caregiver   - Have home health aids and support from daughter who lives in town   - Patient and  show good understanding of her medical condition and are actively involved in care management, including seeking second opinions and exploring treatment options   - Patient's definition of quality of life centers around enjoying time with children and grandchildren, maintaining social engagements, and preserving as much independence as possible in daily activities   - patient and  express they "are fighters" when it comes to healthcare and illness           Follow up: 3 months            SUBJECTIVE:      History of Present Illness / Interval History:  Jessica Floyd is 74 y.o. female with severe emphysema, .former smoking, CAD, R Carotid stent, dyslipidemia, GI bleed, HTN, gout, anxiety, prev SAH (1999) COPD on home O2 (1L PRN), mild pulmonary hypertension, stress induced reflex syncope, history of ESBL UTI   Presents to Palliative Care Clinic for physical symptoms, advance care planning,, clarification of goals of care, and additional support.. " Please see pulmonary note for more details on emphysema      7/28/25  History obtained from: patient and   Patient, a 74-year-old female, presents with peripheral vascular disease causing significant pain and mobility issues. She has two non-healing wounds on her left ankle due to poor circulation. Her pain fluctuates in intensity, sometimes reaching 12 or 15 on a scale of 1-10, occasionally subsiding to 2 after taking Gabapentin. She has difficulty standing and walking, requiring assistance for mobility and daily activities. Patient uses a walker and has recently had home modifications to accommodate a wheelchair.     Six to eight weeks ago, the patient was able to walk around the house with a walker, cook, and use the toilet independently. However, her condition has deteriorated, and she now requires assistance for these activities. She experiences shortness of breath with exertion, particularly when using the toilet. Patient reports using oxygen several months ago but states she has not needed it recently, except for brief use today.     Patient's medical history includes a brain aneurysm 25 years ago, followed by complications during an angiogram resulting in two stents in her right carotid artery. She had COVID-19 two years ago, leading to pneumonia and flu despite being fully vaccinated. Patient experienced two GI bleeds in the past year, with the most recent occurring around May, treated with cauterization.     For pain management, the patient takes Gabapentin for nerve pain and Tramadol for muscle pain. She uses an elliptical machine for 20 minutes every hour, which she reports helps her legs feel better. Patient expresses a strong desire to maintain her quality of life, including enjoying time with her family and attending social events like theater performances. Patient denies current use of oxygen, chest pain, and fever.     - Has difficulty standing and cannot stand completely by herself,  requiring support from her  and brace supports installed throughout the house   - Uses gait belt for support when moving from the car   - Requires assistance with toileting due to difficulty standing   - Has a new bed that goes up and down and a chair that helps her stand up   - Recently had a ramp installed to help her get out of the house   - Had the shower door widened to accommodate a wheelchair for easier access   - Uses grab bars installed throughout the house   - Can walk the length of the house (about 40-60 feet) with physical therapy assistance   - Uses a walker to ambulate   - Cannot cook independently due to inability to stand at the stove   - Exercises using a small elliptical machine while watching TV for about 20 minutes every hour   - experiences edema in the left lower leg, causing difficulty with standing and walking   - Sleeps in a bed that can be adjusted up and down   - Uses a commode by the bed at night with assistance from her      ROS:  Review of Systems   Constitutional:  Positive for activity change and fatigue. Negative for appetite change.   Gastrointestinal:  Negative for constipation, diarrhea and fecal incontinence.   Genitourinary:  Negative for bladder incontinence.   Psychiatric/Behavioral:  Negative for behavioral problems, confusion and depressed mood. The patient is nervous/anxious.        Review of Symptoms      Symptom Assessment (ESAS 0-10 Scale)  Pain:  8  Dyspnea:  0  Anxiety:  6  Nausea:  0  Depression:  0  Anorexia:  0  Fatigue:  5  Insomnia:  0  Restlessness:  0  Agitation:  0     CAM / Delirium:  Negative  Constipation:  Negative  Diarrhea:  Negative    Anxiety:  Is nervous/anxious  Constipation:  No constipation    Bowel Management Plan (BMP):  No      Pain Assessment:    Location(s): leg    Leg       Location: left        Quality: Burning, aching, sharp and pressure-like        Quantity: 8/10 in intensity        Chronicity: Onset 5 month(s) ago, gradually  worsening        Aggravating Factors: Activity        Alleviating Factors: Opiates        Associated Symptoms: None    Modified Jessica Scale:  0    Living Arrangements:  Lives with spouse    Psychosocial/Cultural:   See Palliative Psychosocial Note: No  - Alcohol Use: Patient enjoys sitting on the porch with a glass of wine. Occasionally has 1-2 small glasses.   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   - Marital Status:  for 57 years     **Primary  to Follow**  Palliative Care  Consult: No            Medications:  Current Medications[1]      External  database queried on 07/25/2025  by Ar WEAVER :  07/22/2025 07/22/2025 1 Tramadol Hcl 50 Mg Tablet 30.00 10 [facility] [id number] [facility] ([id number]) 0 30.00 [facility] Private Pay [place]   07/14/2025 07/14/2025 1 Gabapentin 300 Mg Capsule 90.00 30 [facility] [id number] [place] ([id number]) 0  [place]are [place]   06/30/2025 06/30/2025 1 Tramadol Hcl 50 Mg Tablet 30.00 10 [facility] [id number] [facility] ([id number]) 0 30.00 [place] [place]   05/31/2025 05/31/2025 2 Gabapentin 300 Mg Capsule 90.00 30 Em Dec [id number] [facility] ([id number]) 0  Comm Ins [place]         Review of patient's allergies indicates:   Allergen Reactions    Hydromorphone Anxiety, Other (See Comments) and Hallucinations     Severe agitation           OBJECTIVE:         Physical Exam:  Vitals:      Physical Exam  Constitutional:       General: She is not in acute distress.     Appearance: Normal appearance. She is normal weight. She is not ill-appearing or diaphoretic.   HENT:      Head: Normocephalic and atraumatic.   Pulmonary:      Effort: Pulmonary effort is normal.   Musculoskeletal:         General: Swelling present.      Left lower leg: Edema present.   Neurological:      General: No focal deficit present.      Mental Status: She is alert. Mental status is at baseline.   Psychiatric:         Mood and Affect: Mood normal.          Behavior: Behavior normal.         Thought Content: Thought content normal.         Judgment: Judgment normal.           Labs:  BMP  Lab Results   Component Value Date     07/08/2025    K 3.5 07/08/2025    CL 99 07/08/2025    CO2 25 07/08/2025    BUN 11 07/08/2025    CREATININE 0.9 07/08/2025    CALCIUM 9.5 07/08/2025    ANIONGAP 13 07/08/2025    EGFRNORACEVR >60 07/08/2025         Imaging:   Results for orders placed during the hospital encounter of 10/28/23    Echo    Interpretation Summary    Left Ventricle: The left ventricle is normal in size. Normal wall thickness. Normal wall motion. There is normal systolic function with a visually estimated ejection fraction of 60 - 65%. There is normal diastolic function.    Right Ventricle: Mild right ventricular enlargement. Wall thickness is normal. Right ventricle wall motion  is normal. Systolic function is normal.    Aortic Valve: The aortic valve is a unicuspid valve. There is mild aortic valve sclerosis. There is trace aortic regurgitation.    Mitral Valve: There is mild regurgitation.    Pulmonary Artery: There is mild pulmonary hypertension. The estimated pulmonary artery systolic pressure is 40 mmHg.    IVC/SVC: Intermediate venous pressure at 8 mmHg.       I spent a total of 64 minutes on the day of the visit. This includes face to face time in discussion of goals of care, symptom assessment, coordination of care and emotional support.  This also includes non-face to face time preparing to see the patient (eg, review of tests/imaging), obtaining and/or reviewing separately obtained history, documenting clinical information in the electronic or other health record, independently interpreting results and communicating results to the patient/family/caregiver, or care coordinator.     Additional 17 min time spent on a voluntary advance care planning and /or goals of care discussion, providing emotional support, formulating and communicating prognosis and  exploring burden/benefit of various approaches of treatment.        This note was generated with the assistance of ambient listening technology. Verbal consent was obtained by the patient and accompanying visitor(s) for the recording of patient appointment to facilitate this note. I attest to having reviewed and edited the generated note for accuracy, though some syntax or spelling errors may persist. Please contact the author of this note for any clarification.   Ar Copeland NP             [1]   Current Outpatient Medications:     allopurinoL (ZYLOPRIM) 300 MG tablet, Take 1 tablet (300 mg total) by mouth once daily., Disp: 90 tablet, Rfl: 0    aspirin (ECOTRIN) 81 MG EC tablet, Take 1 tablet (81 mg total) by mouth once daily., Disp: 30 tablet, Rfl: 11    atorvastatin (LIPITOR) 80 MG tablet, TAKE 1 TABLET BY MOUTH EVERY DAY, Disp: 90 tablet, Rfl: 3    [Paused] carvediloL (COREG) 6.25 MG tablet, Take 1 tablet (6.25 mg total) by mouth 2 (two) times daily., Disp: 180 tablet, Rfl: 3    ciclopirox (PENLAC) 8 % Soln, Apply topically nightly., Disp: 6.6 mL, Rfl: 11    colchicine (COLCRYS) 0.6 mg tablet, TAKE 1 TABLET (0.6 MG TOTAL) BY MOUTH ONCE DAILY. AS NEEDED FOR GOUT, Disp: 90 tablet, Rfl: 3    cyanocobalamin 1,000 mcg/mL injection, Inject 1,000 mcg into the muscle every 7 days., Disp: 10 mL, Rfl: 5    famotidine (PEPCID) 40 MG tablet, Take 1 tablet (40 mg total) by mouth once daily. Take once a day at lunchtime., Disp: 90 tablet, Rfl: 3    fluticasone furoate-vilanteroL (BREO) 100-25 mcg/dose diskus inhaler, Inhale 1 puff into the lungs., Disp: , Rfl:     fluticasone propionate (FLONASE) 50 mcg/actuation nasal spray, 2 sprays (100 mcg total) by Each Nostril route once daily., Disp: , Rfl:     fluticasone-umeclidin-vilanter (TRELEGY ELLIPTA) 200-62.5-25 mcg inhaler, Inhale 1 puff into the lungs once daily., Disp: 60 each, Rfl: 11    furosemide (LASIX) 20 MG tablet, TAKE 1 TABLET (20 MG TOTAL) BY MOUTH 2 (TWO)  TIMES DAILY AS NEEDED (SWELLING, WEIGHT GAIN, DYSPNEA)., Disp: 180 tablet, Rfl: 1    gabapentin (NEURONTIN) 300 MG capsule, Take 1 capsule (300 mg total) by mouth 3 (three) times daily., Disp: 90 capsule, Rfl: 0    GLYDO 2 % JelP urojet, SMARTSIG:Daily, Disp: , Rfl:     LIDOcaine 4 % Gel, Apply 1 g topically 2 (two) times daily as needed (foot pain)., Disp: , Rfl:     LIDOcaine-prilocaine (EMLA) cream, Apply topically as needed., Disp: 30 g, Rfl: 3    mirtazapine (REMERON) 7.5 MG Tab, Take 1 tablet (7.5 mg total) by mouth every evening., Disp: 90 tablet, Rfl: 1    montelukast (SINGULAIR) 10 mg tablet, Take 10 mg by mouth every evening., Disp: , Rfl:     ondansetron (ZOFRAN-ODT) 8 MG TbDL, Take 1 tablet (8 mg total) by mouth every 6 (six) hours as needed (nausea)., Disp: 30 tablet, Rfl: 1    pantoprazole (PROTONIX) 40 MG tablet, Take 1 tablet (40 mg total) by mouth 2 (two) times daily., Disp: 180 tablet, Rfl: 3    QUEtiapine (SEROQUEL) 50 MG tablet, Take 1 tablet (50 mg total) by mouth 2 (two) times daily., Disp: 180 tablet, Rfl: 1    syringe, disposable, 1 mL Syrg, 1 Stick by Misc.(Non-Drug; Combo Route) route once a week., Disp: 25 each, Rfl: 1    tiotropium bromide (SPIRIVA RESPIMAT) 2.5 mcg/actuation inhaler, INHALE 2 PUFFS BY MOUTH INTO THE LUNGS DAILY, Disp: 4 g, Rfl: 2    traMADoL (ULTRAM) 50 mg tablet, Take 1 tablet (50 mg total) by mouth every 8 (eight) hours as needed for Pain (breakthrough pain)., Disp: 30 tablet, Rfl: 0  No current facility-administered medications for this visit.    Facility-Administered Medications Ordered in Other Visits:     mupirocin 2 % ointment, , Nasal, On Call Procedure, Kang Diaz MD, Given at 10/25/23 1045    tranexamic acid (CYKLOKAPRON) 1,000 mg in sodium chloride 0.9 % 100 mL IVPB (MB+), 1,000 mg, Intravenous, Once, Kang Diaz MD    tranexamic acid (CYKLOKAPRON) 1,000 mg in sodium chloride 0.9 % 100 mL IVPB (MB+), 1,000 mg, Intravenous, Once, Kang Diaz,  MD

## 2025-07-27 NOTE — PROGRESS NOTES
History of present illness: 74-year-old female with multiple medical problems including COPD, atherosclerotic disease, chronic GI disease, and osteoarthritis. Two years ago she developed an episode of podagra. She was clinically diagnosed as having gout. She was seen by Dr. Naranjo and started on allopurinol and colchicine.     I saw her for the 1st time in November.  She had had a gout attack because she was not placed back on allopurinol after she was hospitalized for GI problems.  She was back on allopurinol at the time visit.  She had no evidence of active disease.  I continued her on the same dose of allopurinol.      Since her last visit she was diagnosed as having PA D.  She has a leg ulcer on her left leg.  One week ago she developed pain in her left 1st MTP.  She had swelling and erythema.  She had been continued on colchicine daily.  She did not increase it for the attack.  Heat gave her some relief.  Lidocaine topical helped.    Physical examination:  Musculoskeletal: She still has some tenderness of the left 1st MTP.  She has no swelling.  She has some pain on range of motion.    Assessment: Most likely she had an acute gout attack    Plans:   1. She will have a uric acid level with her next blood work on Monday   2. I instructed her how to take the colchicine for an acute gout attack, being to immediately and then 1 more 1 hour later   3. Keep appointment for December

## 2025-07-28 ENCOUNTER — LAB VISIT (OUTPATIENT)
Dept: LAB | Facility: OTHER | Age: 75
End: 2025-07-28
Attending: NURSE PRACTITIONER
Payer: MEDICARE

## 2025-07-28 ENCOUNTER — OFFICE VISIT (OUTPATIENT)
Dept: PALLIATIVE MEDICINE | Facility: CLINIC | Age: 75
End: 2025-07-28
Payer: MEDICARE

## 2025-07-28 VITALS — HEART RATE: 95 BPM | DIASTOLIC BLOOD PRESSURE: 65 MMHG | SYSTOLIC BLOOD PRESSURE: 125 MMHG | OXYGEN SATURATION: 97 %

## 2025-07-28 DIAGNOSIS — I70.222 CRITICAL LIMB ISCHEMIA OF LEFT LOWER EXTREMITY: ICD-10-CM

## 2025-07-28 DIAGNOSIS — M10.9 GOUT, UNSPECIFIED CAUSE, UNSPECIFIED CHRONICITY, UNSPECIFIED SITE: ICD-10-CM

## 2025-07-28 DIAGNOSIS — R06.02 SOB (SHORTNESS OF BREATH): ICD-10-CM

## 2025-07-28 DIAGNOSIS — D50.0 IRON DEFICIENCY ANEMIA SECONDARY TO BLOOD LOSS (CHRONIC): ICD-10-CM

## 2025-07-28 DIAGNOSIS — Z51.5 PALLIATIVE CARE ENCOUNTER: ICD-10-CM

## 2025-07-28 DIAGNOSIS — J43.9 PULMONARY EMPHYSEMA, UNSPECIFIED EMPHYSEMA TYPE: Primary | Chronic | ICD-10-CM

## 2025-07-28 LAB
ABSOLUTE EOSINOPHIL (OHS): 0.3 K/UL
ABSOLUTE MONOCYTE (OHS): 2.69 K/UL (ref 0.3–1)
ABSOLUTE NEUTROPHIL COUNT (OHS): 4.91 K/UL (ref 1.8–7.7)
ALBUMIN SERPL BCP-MCNC: 2.9 G/DL (ref 3.5–5.2)
ALP SERPL-CCNC: 81 UNIT/L (ref 40–150)
ALT SERPL W/O P-5'-P-CCNC: 12 UNIT/L (ref 10–44)
ANION GAP (OHS): 7 MMOL/L (ref 8–16)
ANISOCYTOSIS BLD QL SMEAR: SLIGHT
AST SERPL-CCNC: 28 UNIT/L (ref 11–45)
BASOPHILS # BLD AUTO: 0.32 K/UL
BASOPHILS NFR BLD AUTO: 3 %
BILIRUB SERPL-MCNC: 0.6 MG/DL (ref 0.1–1)
BUN SERPL-MCNC: 17 MG/DL (ref 8–23)
CALCIUM SERPL-MCNC: 8.3 MG/DL (ref 8.7–10.5)
CHLORIDE SERPL-SCNC: 101 MMOL/L (ref 95–110)
CO2 SERPL-SCNC: 26 MMOL/L (ref 23–29)
CREAT SERPL-MCNC: 0.7 MG/DL (ref 0.5–1.4)
ERYTHROCYTE [DISTWIDTH] IN BLOOD BY AUTOMATED COUNT: 25.2 % (ref 11.5–14.5)
GFR SERPLBLD CREATININE-BSD FMLA CKD-EPI: >60 ML/MIN/1.73/M2
GLUCOSE SERPL-MCNC: 94 MG/DL (ref 70–110)
HCT VFR BLD AUTO: 28.2 % (ref 37–48.5)
HGB BLD-MCNC: 8.1 GM/DL (ref 12–16)
IMM GRANULOCYTES # BLD AUTO: 0.38 K/UL (ref 0–0.04)
IMM GRANULOCYTES NFR BLD AUTO: 3.6 % (ref 0–0.5)
LYMPHOCYTES # BLD AUTO: 1.99 K/UL (ref 1–4.8)
MCH RBC QN AUTO: 28.4 PG (ref 27–31)
MCHC RBC AUTO-ENTMCNC: 28.7 G/DL (ref 32–36)
MCV RBC AUTO: 99 FL (ref 82–98)
NUCLEATED RBC (/100WBC) (OHS): 0 /100 WBC
PLATELET # BLD AUTO: 423 K/UL (ref 150–450)
PLATELET BLD QL SMEAR: NORMAL
PMV BLD AUTO: 9.8 FL (ref 9.2–12.9)
POIKILOCYTOSIS BLD QL SMEAR: SLIGHT
POTASSIUM SERPL-SCNC: 4.1 MMOL/L (ref 3.5–5.1)
PROT SERPL-MCNC: 6.7 GM/DL (ref 6–8.4)
RBC # BLD AUTO: 2.85 M/UL (ref 4–5.4)
RELATIVE EOSINOPHIL (OHS): 2.8 %
RELATIVE LYMPHOCYTE (OHS): 18.8 % (ref 18–48)
RELATIVE MONOCYTE (OHS): 25.4 % (ref 4–15)
RELATIVE NEUTROPHIL (OHS): 46.4 % (ref 38–73)
SCHISTOCYTES BLD QL SMEAR: NORMAL
SODIUM SERPL-SCNC: 134 MMOL/L (ref 136–145)
URATE SERPL-MCNC: 4.6 MG/DL (ref 2.4–5.7)
WBC # BLD AUTO: 10.59 K/UL (ref 3.9–12.7)

## 2025-07-28 PROCEDURE — 1159F MED LIST DOCD IN RCRD: CPT | Mod: CPTII,NTX,S$GLB,

## 2025-07-28 PROCEDURE — 99999 PR PBB SHADOW E&M-EST. PATIENT-LVL III: CPT | Mod: PBBFAC,TXP,,

## 2025-07-28 PROCEDURE — 99497 ADVNCD CARE PLAN 30 MIN: CPT | Mod: 25,NTX,S$GLB,

## 2025-07-28 PROCEDURE — 36415 COLL VENOUS BLD VENIPUNCTURE: CPT | Mod: TXP

## 2025-07-28 PROCEDURE — 1101F PT FALLS ASSESS-DOCD LE1/YR: CPT | Mod: CPTII,NTX,S$GLB,

## 2025-07-28 PROCEDURE — 85025 COMPLETE CBC W/AUTO DIFF WBC: CPT | Mod: TXP

## 2025-07-28 PROCEDURE — 1158F ADVNC CARE PLAN TLK DOCD: CPT | Mod: CPTII,NTX,S$GLB,

## 2025-07-28 PROCEDURE — 3288F FALL RISK ASSESSMENT DOCD: CPT | Mod: CPTII,NTX,S$GLB,

## 2025-07-28 PROCEDURE — 99215 OFFICE O/P EST HI 40 MIN: CPT | Mod: 25,NTX,S$GLB,

## 2025-07-28 PROCEDURE — 82947 ASSAY GLUCOSE BLOOD QUANT: CPT | Mod: TXP

## 2025-07-28 PROCEDURE — 84550 ASSAY OF BLOOD/URIC ACID: CPT | Mod: TXP

## 2025-07-28 PROCEDURE — 99417 PROLNG OP E/M EACH 15 MIN: CPT | Mod: NTX,S$GLB,,

## 2025-07-28 PROCEDURE — 3078F DIAST BP <80 MM HG: CPT | Mod: CPTII,NTX,S$GLB,

## 2025-07-28 PROCEDURE — 3074F SYST BP LT 130 MM HG: CPT | Mod: CPTII,NTX,S$GLB,

## 2025-07-28 PROCEDURE — 84238 ASSAY NONENDOCRINE RECEPTOR: CPT | Mod: TXP

## 2025-07-29 ENCOUNTER — OFFICE VISIT (OUTPATIENT)
Dept: HEMATOLOGY/ONCOLOGY | Facility: CLINIC | Age: 75
End: 2025-07-29
Payer: MEDICARE

## 2025-07-29 VITALS
HEIGHT: 63 IN | HEART RATE: 94 BPM | RESPIRATION RATE: 14 BRPM | WEIGHT: 108.25 LBS | DIASTOLIC BLOOD PRESSURE: 68 MMHG | SYSTOLIC BLOOD PRESSURE: 123 MMHG | TEMPERATURE: 98 F | BODY MASS INDEX: 19.18 KG/M2 | OXYGEN SATURATION: 96 %

## 2025-07-29 DIAGNOSIS — D72.829 LEUKOCYTOSIS, UNSPECIFIED TYPE: ICD-10-CM

## 2025-07-29 DIAGNOSIS — I73.9 PAD (PERIPHERAL ARTERY DISEASE): ICD-10-CM

## 2025-07-29 DIAGNOSIS — R60.0 EDEMA OF LEFT LOWER EXTREMITY: ICD-10-CM

## 2025-07-29 DIAGNOSIS — D50.0 IRON DEFICIENCY ANEMIA SECONDARY TO BLOOD LOSS (CHRONIC): Primary | ICD-10-CM

## 2025-07-29 DIAGNOSIS — D53.9 MACROCYTIC ANEMIA: Primary | ICD-10-CM

## 2025-07-29 DIAGNOSIS — N18.31 CKD STAGE G3A/A1, GFR 45-59 AND ALBUMIN CREATININE RATIO <30 MG/G: ICD-10-CM

## 2025-07-29 DIAGNOSIS — D72.821 MONOCYTOSIS: ICD-10-CM

## 2025-07-29 PROCEDURE — 1160F RVW MEDS BY RX/DR IN RCRD: CPT | Mod: CPTII,S$GLB,, | Performed by: INTERNAL MEDICINE

## 2025-07-29 PROCEDURE — 1125F AMNT PAIN NOTED PAIN PRSNT: CPT | Mod: CPTII,S$GLB,, | Performed by: INTERNAL MEDICINE

## 2025-07-29 PROCEDURE — 1101F PT FALLS ASSESS-DOCD LE1/YR: CPT | Mod: CPTII,S$GLB,, | Performed by: INTERNAL MEDICINE

## 2025-07-29 PROCEDURE — 99999 PR PBB SHADOW E&M-EST. PATIENT-LVL IV: CPT | Mod: PBBFAC,,, | Performed by: INTERNAL MEDICINE

## 2025-07-29 PROCEDURE — 3288F FALL RISK ASSESSMENT DOCD: CPT | Mod: CPTII,S$GLB,, | Performed by: INTERNAL MEDICINE

## 2025-07-29 PROCEDURE — 1159F MED LIST DOCD IN RCRD: CPT | Mod: CPTII,S$GLB,, | Performed by: INTERNAL MEDICINE

## 2025-07-29 PROCEDURE — 3078F DIAST BP <80 MM HG: CPT | Mod: CPTII,S$GLB,, | Performed by: INTERNAL MEDICINE

## 2025-07-29 PROCEDURE — 3074F SYST BP LT 130 MM HG: CPT | Mod: CPTII,S$GLB,, | Performed by: INTERNAL MEDICINE

## 2025-07-29 PROCEDURE — 99215 OFFICE O/P EST HI 40 MIN: CPT | Mod: S$GLB,,, | Performed by: INTERNAL MEDICINE

## 2025-07-29 RX ORDER — HYDROCODONE BITARTRATE AND ACETAMINOPHEN 5; 325 MG/1; MG/1
1 TABLET ORAL EVERY 6 HOURS PRN
Qty: 28 TABLET | Refills: 0 | Status: SHIPPED | OUTPATIENT
Start: 2025-07-29

## 2025-07-29 NOTE — Clinical Note
Kishan Oscar, I'm from [facility] - I've been following this pt for iron deficiency anemia 2/2 colon AVMs and her hx of small bowel resection. Saw her back today in follow up.   She has a hx of Incarcerated inguinal hernia with necrotic small bowel - you did her resection last May.  Since I last saw her, she was admitted in May of this year with a GI bleed 2/2 AVM on colonoscopy. Her CTA is showing a fluid filled collection in the left inguinal region - wanted to get your thoughts on if this needs any workup? No symptoms except ongoing iron deficiency  Thanks!

## 2025-07-29 NOTE — PROGRESS NOTES
[facility]     Outpatient Hematology/Medical Oncology Note  Patient: Jessica Floyd  MRN: [id number]  Primary Care Provider: Jace Valencia MD  Chief Complaint: Macrocytic Anemia, Leukocytosis    Subjective     Subjective:   HPI:   Jessica Floyd is a 74 y.o. female with PMH significant for:   - HTN (no longer on antihypertensives)/CKD3  - COPD (trelegy,on intermittent home O2, started 2/2025)  - HFpEF (lasix PRN)  - Incarcerated inguinal hernia s/p necrotic small bowel resection (5/17/24)  - Colon AVMs s/p APC (most recently 5/2025)  - Severe LLE PAD (medically managed)   - Nonobstructive CAD  - SAH (2/2 to ruptured aneurysm in 1999 s/p clipping)  - Right carotid artery stent (iatrogenic rupture during cerebral angiography)  - Osteoporosis (Left femur fracture s/p THR -5/2023)    She is followed by Hematology for a 3 year history of macrocytic anemia, first identified in 10/2022 with prior evidence of iron deficiency in setting of GI AVMs and B12 deficiency in the setting of her small bowel resection; and leukocytosis with monocytosis/eosinophilia. She presents today for follow up.     HPI:  Feb 2023: presented to the ED with significant epistaxis  April 2023 :ENT - diagnosed with chronic maxillary sinusitis and nasal polyposis  March 2023: first appointment with Hematology for new onset ERIK of unclear cause   Nov and Dec 2023: admissions for COVID and influenza with subsequent COPD exacerbations   5/14 - 6/3/2024: presented with acute GI bleed   5/14/24: CTA: acute bleed at the level of the cecum - incidental findings: severe plaque burden at bilateral common iliac artery and bilateral common femoral artery   5/15/24: EGD: 2 cm hiatal hernia, otherwise unremarkable; 5/16/24: Colonoscopy: poor prep - clotted blood in entire colon; unable to visualize colon  5/14/24: 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  5/17/24: Given continued pain after the embolization, she underwent  exploratory laparotomy with findings of necrotic small bowel in incarcerated left inguinal hernia s/p small bowel resection and tissue repair of inguinal hernias (through the same segment of bowel that had coils in the vasculature)    Hematology History:  10/2022: first noted to be anemic - Hgb Range: 8.5 - 12 range, MCV: 105, WBC and platelets: wnl (WBC elevated in setting of recent viral illnesses, anemia nadiring after L THR and hospitalizations for COVID, flu, CAP, ER visits for significant epistaxis)  Treatment: IV Iron - (venofer): May 2023 (5/8, 5/15, 5/22, 6/22, 7/6), Sept 2023 (9/19, 9/28, 10/6), May 2024 (5/10/24), August 2024 (8/13/2024), July 2025 (7/2 and 7/16/2025)    Interval History:    5/27-5/31/2025: admitted for melena in setting of Hgb of 4.9 s/p 2 units pRBC, colonoscopy with one bleeding colonic AVM in the AC s/p APC and one non-bleeding angioectasia s/p APC - requiring specialized balloon enteroscopy  6/20-6/21/2025: readmitted for Hgb of 6 s/p 1 unit pRBC  7/2 and 7/16/2025: IV iron with ferraheme 510 mg IV x 2 doses    Ms. Floyd is very deconditioned today compared to last visit in May 2025. She arrives in a clinic supplied wheelchair. She is no longer able to do most ADLs on her own, uses a wheelchair and walker to ambulate due to pain from the PAD in her LLE/also has a non-healing LLE arterial ulcer. No recurrent GI bleeding since discharge from the hospital in May 2025.     Her current symptoms are:   (1) Unintentional weight loss: She is overall better after her hospitalization last year for an SBO. However, her main concern today is inability to gain weight. She typically weighs around 130 lbs. Over the last year and half, she has lost about 25 lbs unintentionally, weighing 106 lbs in 3/2025, now stable at 110 lbs. No other GI symptoms or fevers, nightsweats, adenopathy, etc.    - She was previously on PO iron but stopped in 2023 due to constipation     Patient otherwise denies GI sx -  no further episodes of bleeding, bowel movements have now normalized, denies fatigue, fevers, chills, night sweats, headaches, chest pain, SOB, cough, abdominal pain, N/V, diarrhea, constipation, rashes.     Review of Systems:  14-point review of systems was asked with the pertinent positives and/or negatives stated in HPI.     Past Medical History:   - HTN (HCTZ)/CKD3  - COPD (trelegy,on intermittent home O2, started 2/2025)  - HFpEF (lasix)  - Incarcerated inguinal hernia s/p necrotic small bowel resection (5/17/24)  - Known Colon AVMs s/p APC (most recently 5/2025)  - Severe LLE PAD (medically managed)   - Nonobstructive CAD  - SAH (2/2 to ruptured aneurysm in 1999 s/p clipping)  - Right carotid artery stent (iatrogenic rupture during cerebral angiography)  - Osteoporosis (Left femur fracture s/p THR -5/2023)  - HLD      Past Surgical History:   - Incarcerated inguinal hernia s/p necrotic small bowel resection (5/17/24)  - SAH due to ruptured aneurysm (1999 s/p clipping) c/b right carotid artery dissection requiring a stent, Left femur fracture after mechanical fall s/p L THR (5/2023)    Family History:   - No known family history of cancer     Social History:   - originally from the [place], 3 daughters (1 daughter in [facility], 1 in [place] and 1 in [place]) and 4 granddaughters (Mike)  - previously lived in [facility], moved to [place] in her early 60s  - Smoking History: Former, quit in her 50s, started in her 20s, about 2 cigarettes per day  - Alcohol: socially, red wine   - Illicit Drug Use: denies     Allergies:  Review of patient's allergies indicates:   Allergen Reactions    Hydromorphone Anxiety, Other (See Comments) and Hallucinations     Severe agitation       Medications:  Current Outpatient Medications   Medication Sig Dispense Refill    allopurinoL (ZYLOPRIM) 300 MG tablet Take 1 tablet (300 mg total) by mouth once daily. 90 tablet 0    aspirin (ECOTRIN) 81 MG EC tablet Take 1 tablet (81 mg total)  by mouth once daily. 30 tablet 11    atorvastatin (LIPITOR) 80 MG tablet TAKE 1 TABLET BY MOUTH EVERY DAY 90 tablet 3    [Paused] carvediloL (COREG) 6.25 MG tablet Take 1 tablet (6.25 mg total) by mouth 2 (two) times daily. 180 tablet 3    ciclopirox (PENLAC) 8 % Soln Apply topically nightly. 6.6 mL 11    colchicine (COLCRYS) 0.6 mg tablet TAKE 1 TABLET (0.6 MG TOTAL) BY MOUTH ONCE DAILY. AS NEEDED FOR GOUT 90 tablet 3    cyanocobalamin 1,000 mcg/mL injection Inject 1,000 mcg into the muscle every 7 days. 10 mL 5    famotidine (PEPCID) 40 MG tablet Take 1 tablet (40 mg total) by mouth once daily. Take once a day at lunchtime. 90 tablet 3    fluticasone furoate-vilanteroL (BREO) 100-25 mcg/dose diskus inhaler Inhale 1 puff into the lungs.      fluticasone propionate (FLONASE) 50 mcg/actuation nasal spray 2 sprays (100 mcg total) by Each Nostril route once daily.      fluticasone-umeclidin-vilanter (TRELEGY ELLIPTA) 200-62.5-25 mcg inhaler Inhale 1 puff into the lungs once daily. 60 each 11    furosemide (LASIX) 20 MG tablet TAKE 1 TABLET (20 MG TOTAL) BY MOUTH 2 (TWO) TIMES DAILY AS NEEDED (SWELLING, WEIGHT GAIN, DYSPNEA). 180 tablet 1    gabapentin (NEURONTIN) 300 MG capsule Take 1 capsule (300 mg total) by mouth 3 (three) times daily. 90 capsule 0    GLYDO 2 % JelP urojet SMARTSIG:Daily      HYDROcodone-acetaminophen (NORCO) 5-325 mg per tablet Take 1 tablet by mouth every 6 (six) hours as needed for Pain. 28 tablet 0    LIDOcaine 4 % Gel Apply 1 g topically 2 (two) times daily as needed (foot pain).      LIDOcaine-prilocaine (EMLA) cream Apply topically as needed. 30 g 3    mirtazapine (REMERON) 7.5 MG Tab Take 1 tablet (7.5 mg total) by mouth every evening. 90 tablet 1    montelukast (SINGULAIR) 10 mg tablet Take 10 mg by mouth every evening.      ondansetron (ZOFRAN-ODT) 8 MG TbDL Take 1 tablet (8 mg total) by mouth every 6 (six) hours as needed (nausea). 30 tablet 1    pantoprazole (PROTONIX) 40 MG tablet Take  "1 tablet (40 mg total) by mouth 2 (two) times daily. 180 tablet 3    QUEtiapine (SEROQUEL) 50 MG tablet Take 1 tablet (50 mg total) by mouth 2 (two) times daily. 180 tablet 1    syringe, disposable, 1 mL Syrg 1 Stick by Misc.(Non-Drug; Combo Route) route once a week. 25 each 1    tiotropium bromide (SPIRIVA RESPIMAT) 2.5 mcg/actuation inhaler INHALE 2 PUFFS BY MOUTH INTO THE LUNGS DAILY 4 g 2     No current facility-administered medications for this visit.     Facility-Administered Medications Ordered in Other Visits   Medication Dose Route Frequency Provider Last Rate Last Admin    mupirocin 2 % ointment   Nasal On Call Procedure Kang Diaz MD   Given at 10/25/23 1045    tranexamic acid (CYKLOKAPRON) 1,000 mg in sodium chloride 0.9 % 100 mL IVPB (MB+)  1,000 mg Intravenous Once Kang Diaz MD        tranexamic acid (CYKLOKAPRON) 1,000 mg in sodium chloride 0.9 % 100 mL IVPB (MB+)  1,000 mg Intravenous Once Kang Diaz MD              Objective:   Vitals:   Vitals:    07/29/25 1454   BP: 123/68   Pulse: 94   Resp: 14   Temp: 98.1 °F (36.7 °C)   TempSrc: Oral   SpO2: 96%   Weight: 49.1 kg (108 lb 3.9 oz)   Height: 5' 3" (1.6 m)       BMI: Body mass index is 19.17 kg/m².    Physical Exam  ECOG Performance Status:  2  General Appearance:    Alert, cooperative, no distress    Head:    Normocephalic, without obvious abnormality, atraumatic   Throat:   Lips, mucosa, and tongue normal; teeth and gums normal   Neck:   Supple, symmetrical, no adenopathy;     thyroid:  no enlargement/tenderness/nodules   Lungs:     Clear to auscultation bilaterally, respirations unlabored    Heart:    Regular rate and rhythm, S1 and S2 normal   Abdomen:     Soft, non-tender, bowel sounds active, no masses, no organomegaly   Extremities:   2+ LLE edema, minimal RLE edema   Pulses:   2+ and symmetric all extremities   Skin:   Skin color, texture, turgor normal, no rashes or lesions   Lymph nodes:   Cervical, supraclavicular, and " axillary nodes normal   Neurologic:   Generalized weaknes, normal sensation     Laboratory Data:  Lab Visit on 07/28/2025   Component Date Value Ref Range Status    Sodium 07/28/2025 134 (L)  136 - 145 mmol/L Final    Potassium 07/28/2025 4.1  3.5 - 5.1 mmol/L Final    Chloride 07/28/2025 101  95 - 110 mmol/L Final    CO2 07/28/2025 26  23 - 29 mmol/L Final    Glucose 07/28/2025 94  70 - 110 mg/dL Final    BUN 07/28/2025 17  8 - 23 mg/dL Final    Creatinine 07/28/2025 0.7  0.5 - 1.4 mg/dL Final    Calcium 07/28/2025 8.3 (L)  8.7 - 10.5 mg/dL Final    Protein Total 07/28/2025 6.7  6.0 - 8.4 gm/dL Final    Albumin 07/28/2025 2.9 (L)  3.5 - 5.2 g/dL Final    Bilirubin Total 07/28/2025 0.6  0.1 - 1.0 mg/dL Final    For infants and newborns, interpretation of results should be based   on gestational age, weight and in agreement with clinical   observations.    Premature Infant recommended reference ranges:   0-24 hours:  <8.0 mg/dL   24-48 hours: <12.0 mg/dL   3-5 days:    <15.0 mg/dL   6-29 days:   <15.0 mg/dL    ALP 07/28/2025 81  40 - 150 unit/L Final    AST 07/28/2025 28  11 - 45 unit/L Final    ALT 07/28/2025 12  10 - 44 unit/L Final    Anion Gap 07/28/2025 7 (L)  8 - 16 mmol/L Final    eGFR 07/28/2025 >60  >60 mL/min/1.73/m2 Final    Estimated GFR calculated using the CKD-EPI creatinine (2021) equation.    Uric Acid 07/28/2025 4.6  2.4 - 5.7 mg/dL Final    WBC 07/28/2025 10.59  3.90 - 12.70 K/uL Final    RBC 07/28/2025 2.85 (L)  4.00 - 5.40 M/uL Final    HGB 07/28/2025 8.1 (L)  12.0 - 16.0 gm/dL Final    HCT 07/28/2025 28.2 (L)  37.0 - 48.5 % Final    MCV 07/28/2025 99 (H)  82 - 98 fL Final    MCH 07/28/2025 28.4  27.0 - 31.0 pg Final    MCHC 07/28/2025 28.7 (L)  32.0 - 36.0 g/dL Final    RDW 07/28/2025 25.2 (H)  11.5 - 14.5 % Final    Platelet Count 07/28/2025 423  150 - 450 K/uL Final    MPV 07/28/2025 9.8  9.2 - 12.9 fL Final    Nucleated RBC 07/28/2025 0  <=0 /100 WBC Final    Neut % 07/28/2025 46.4  38 - 73 %  Final    Lymph % 2025 18.8  18 - 48 % Final    Mono % 2025 25.4 (H)  4 - 15 % Final    Eos % 2025 2.8  <=8 % Final    Basophil % 2025 3.0 (H)  <=1.9 % Final    Imm Grans % 2025 3.6 (H)  0.0 - 0.5 % Final    Neut # 2025 4.91  1.8 - 7.7 K/uL Final    Lymph # 2025 1.99  1 - 4.8 K/uL Final    Mono # 2025 2.69 (H)  0.3 - 1 K/uL Final    Eos # 2025 0.30  <=0.5 K/uL Final    Baso # 2025 0.32 (H)  <=0.2 K/uL Final    Imm Grans # 2025 0.38 (H)  0.00 - 0.04 K/uL Final    Mild elevation in immature granulocytes is non specific and can be seen in a variety of conditions including stress response, acute inflammation, trauma and pregnancy. Correlation with other laboratory and clinical findings is essential.    Platelet Estimate 2025 Appears Normal    Final    Fragmented Cells 2025 Occasional   Final    Anisocytosis 2025 Slight   Final    Poik 2025 Slight   Final     Labs:   2024:   - WBC: 14, Hgb: 10.3, MCV: 110, platelets: 387, WBC: 14 (ANC: 8500), TSAT: 10%, Ferritin: 24 (2023) to 49 (2023), 611 (2023), now 69,  folate : 14, B12: 586, MMA: 1.72, retic: 7.6, smear: unremarkable (moderate anisopoikilocytosis, leukocytosis w/ reactive changes), Cr: 0.8, GFR: 60, LFTs: wnl, KF.5, LFLC: 3.99, Ratio: wnl, HIV and hepatitis testing: negative, haptoglobin: 264 (2023), retic: 4 (2023)     2024:   - WBC: 13, Hgb: 9.8, MCV: 100, platelets: 302, monocytes: 23% (3100),CMP: unremarkable - Cr: 1.3, GFR: 43, TSAT: 4%, STFR: 7.4, Ferritn: 135, B12: 1830, MMA: 0.46 (1.72), copper: 1445, zinc: 40, SPEP/RADHA: wnl, k flc; 9.68, L FLC: 10,67, Ratio: 0.92    2025:   - WBC: 16 (22), ANC: 10,000, monocytes; 3000, eosinphils: 700, basophils; 280, Hgb: 12, MCV: 105, platelets: 386  - CMP: Cr: 0.9, GFR: 40-60, K: 3.4  - Ferritin: 229, TSAT: 22%, Folate: 7  - KF, LF, Ratio: 1.12; [] spep/radha    2025:  - CBC: WBC:  15, Hgb: 11.8, , platelets: 390   - ANC: 9460, Monocytes: 2610, eosinophils: 1210, basophils: 250   - Flow cytometry (PB): mild relative monocytosis - no monotypic B cell population or aberrant T cell antigen expression  - Smear: very rare subset of dysplastic hypogranulated and/or hyposegmented neutrophils (<1%) and relative and absolute monocytosis with predominantly mature monocytes, mild macrocytic RBCs, increased eosinophils   - CMP: Cr: 0.8, GFR: 60, LFTs: wnl   - ESR: 66 (46), CRP: 1.6   - STFR: 7.1 , TSAT: 13%, ferritin: 55   - MMA: 0.46    2025:   - CBC: WBC: 10, Hgb: 8.1 (8.9), MCV: 99, platelets: 423, monocytes: 2690, basophils: 320  - CMP: Na: 134  - Ferritin: not drawn due to recent IV iron infusion, STFR: pending      Imagin2025: CTA CAP: new occlusion of the left common iliac and left external iliac arteries with distal reconstitution, left inguinal fluid filled structure - bowel containing inguinal hernia vs. abscess    24: CTA: acute bleed at the level of the cecum - incidental findings: severe plaque burden at bilateral common iliac artery and bilateral common femoral artery     24: CT chest w/o contrast: COPD, scattered pulmonary nodules up to 7 mm in BJORN (exam limited by motion)    10/31/2023: CT chest- small new cluster of RLL micronodules, likely infectious/inflammatory     2023: CTA chest - evaluation limited due to motion artifact, patchy nonspecific GGOs involving lingula, RML and right lung base, may reflect infectious/inflammatory change     Endoscopies:   2025:  colonoscopy with one bleeding colonic AVM in the AC s/p APC and one non-bleeding angioectasia s/p APC - requiring specialized balloon enteroscopy    5/15/24: EGD: 2 cm hiatal hernia, otherwise unremarkable     24: Colonoscopy: poor prep   - clotted blood in entire colon; unable to visualize colon    Assessment   Assessment and Plan:   eJssica Floyd is a 74 y.o. female with CAD and  severe LLE PAD (medically managed), HTN/CKD3, COPD (on intermittent home O2), SAH (2/2 to ruptured aneurysm in 1999), Right carotid artery stent (iatrogenic rupture during cerebral angiography, on aspirin), HFpEF (lasix), incarcerated inguinal hernia s/p necrotic small bowel resection (5/17/24). She is followed by Hematology for a 3 year history of macrocytic anemia, first identified in 10/2022 with prior evidence of iron deficiency and B12 deficiency; and leukocytosis. She presents today for follow up, admitted in May 2025 for a GI bleeding 2/2 bleeding AVM in the ascending colon s/p APC.     April 2025 Labs:   - CBC: WBC: 15 (16), Hgb: 11.8, (12), , platelets: 390   - ANC: 9460, Monocytes: 2610 (3000), eosinophils: 1210 (700), basophils: 250   - Flow cytometry (PB): mild relative monocytosis - no monotypic B cell population or aberrant T cell antigen expression  - Smear: very rare subset of dysplastic hypogranulated and/or hyposegmented neutrophils (<1%) and relative and absolute monocytosis with predominantly mature monocytes, mild macrocytic RBCs, increased eosinophils   - CMP: Cr: 0.8, GFR: 60 (GFR: 40-60), LFTs: wnl   - ESR: 66 (46), CRP: 1.6   - STFR: 7.1 , TSAT: 13% (22%), ferritin: 55 (229)  - MMA: 0.46    # Macrocytic Anemia   # Vitamin B12 Deficiency    # Iron Deficiency    - More recent exacerbation in baseline anemia secondary to ERIK from bleeding AVMs s/p IV feraheme - on 7/2 and 7/16/2025  - CBC from 7/28/2025 with Hgb: 8.1 dropping from 8.9 despite IV iron which could be due to ongoing blood loss or other cause of anemia  - Other contributors include component of vitamin b12 deficiency (prior MMA: 1.72, MCV >100; anemia improving after starting IM B12 in May 2024), anemia of chronic disease/inflammation (her anemia nadirs during COVID, influenza infections, hospitalizations etc), anemia of renal disease (GFR at one point of 43), and possible early MDS vs. other bone marrow process based on  age/elevated MCV/leukocytosis below.   - Encouraged continued GI follow up  - Labs in 4 weeks from IV iron to assess need for additional replacement     # Leukocytosis  # Neutrophilia  # Monocytosis  # Eosinophilia  - WBC intermittently elevated since 2023, predominantly neutrophilia; smear 10/2024 with leukocytosis w/ left shift, no blasts; suggestive of an inflammatory process  - 2024: bcr/abl negative  - 4/2025 peripheral smear: very rare subset of dysplastic hypogranulated and/or hyposegmented neutrophils (<1%) and relative and absolute monocytosis with predominantly mature monocytes, mild macrocytic RBCs, increased eosinophils   - 4/2025 peripheral blood flow cytometry: mild relative monocytosis - no monotypic B cell population or aberrant T cell antigen expression  - Reviewed role of bone marrow biopsy to assess for CMML, pt elects to CTM with labs and declines bone marrow biopsy at this time.      # LLE Edema   - 5/28/2025 : LLE US: negative for DVT - sx worse since this ultrasound  - LLE ultrasound ASAP    # Unintentional Weight Loss  - Unclear cause but started after small bowel resection last year; CT chest in 10/2024 and CT A/P in July 2024 without overt malignancy  - Weight now stable  - Encouraged her to follow up with PCP and GI for further assessment    # Other Co-Morbidities  - B12 deficiency: IF abs negative, MMA: 0.46 from 1.72 after starting IM B12 in May 2024 (1.72 in 3/2024 prior to small bowel resection), continue IM B12 monthly, per PCP   - Elevated FLCs: normal ratio, no monoclonal protein on spep/miroslava suggestive of inflammation, GFR upper 50s on occasional (which would make FLC in normal range for her level of renal function), CTM  - Incarcerated inguinal hernia s/p necrotic small bowel resection (5/17/24) and GIB (embolization at level of cecum): encouraged GI and surgery follow up   - COPD/Pulmonary nodules: noted on CT chest on 5/17/24 and 10/2024 (few stable 6 mm pulmonary nodules), given  smoking hx, consider repeat CT chest in 6 months, per PCP and pulmonary  - PAD: severe plaque burden at bilateral common iliac artery and bilateral common femoral artery noted on CTA in May 2024, per vascular surgery  - Epistaxis (2/2023, presumably from dry nasal mucosa, requiring rhino rocket): per ENT  - Nonobstructive CAD, HTN/CKD3, COPD/PH on TTE, SAH (2/2 to ruptured aneurysm in 1999), Right Carotid artery stent for complication (iatrogenic rupture during cerebral angiography), HFpEF (lasix): stable on current regimen, per PCP  - Cancer Screening: Colonoscopy: 7/2025, Pap: 2018 (Pap: wnl, HPV: neg), Lung cancer screening: last 10/2024, MMG: 3/2025: wnl - per PCP    To do:  - LLE US ASAP  - RTC in 1 months with labs prior - CBC, CMP, Ferritin, Iron studies, Soluble Transferrin Receptor, flow (f/u on monocytosis)  - PCP (IM B12), Pulmonology (micronodules)       Med Onc Chart Routing  Urgent    Follow up with physician . RTC as scheduled   Follow up with [facility]    Infusion scheduling note    Injection scheduling note    Labs    Imaging   LLE ultrasound ASAP   Pharmacy appointment    Other referrals               MDM includes:    - Acute or chronic illness or injury that poses a threat to life or bodily function  - Review of prior external notes from unique source  - Independent review and explanation of 3+ results from unique tests   - Discussion of management and ordering 3+ unique tests   - Extensive discussion of treatment and management  - Prescription drug management  - Drug therapy requiring intensive monitoring for toxicity    - Overall, I discussed the diagnosis, history, stage, labs/imaging, prognosis, management, and treatment plan as applicable. I reviewed adverse short and long term effects as applicable.   - Informed patient if symptoms are getting worse that it is their responsibility to call the clinic and schedule follow up sooner than stated follow up. Also informed patient if they do not hear  from the appointment center in 2-5 business days for their referrals, the patient must call the Oncology clinic so we can follow up on procedures or referral scheduling. Patient was fully informed of current medical plan, all questions were answered and patient verbalized understanding. No further questions.   - Face to Face Visit time I spent with the patient: 60 minutes of total time spent on the encounter, including counseling patient and/or coordinating care, which includes face to face time and non-face to face time preparing to see the patient (eg, review of tests), Obtaining and/or reviewing separately obtained history, Documenting clinical information in the electronic or other health record, Independently interpreting results (not separately reported) and communicating results to the patient/family/caregiver, or Care coordination (not separately reported).     Signed   Agata Rolon MD  [facility]

## 2025-07-30 ENCOUNTER — PATIENT MESSAGE (OUTPATIENT)
Dept: HEMATOLOGY/ONCOLOGY | Facility: CLINIC | Age: 75
End: 2025-07-30
Payer: MEDICARE

## 2025-07-31 LAB — STFR SERPL-MCNC: 7.5 MG/L (ref 1.8–4.6)

## 2025-08-01 ENCOUNTER — TELEPHONE (OUTPATIENT)
Dept: VASCULAR SURGERY | Facility: CLINIC | Age: 75
End: 2025-08-01
Payer: MEDICARE

## 2025-08-02 ENCOUNTER — PATIENT MESSAGE (OUTPATIENT)
Dept: INTERNAL MEDICINE | Facility: CLINIC | Age: 75
End: 2025-08-02
Payer: MEDICARE

## 2025-08-02 ENCOUNTER — PATIENT MESSAGE (OUTPATIENT)
Dept: HEMATOLOGY/ONCOLOGY | Facility: CLINIC | Age: 75
End: 2025-08-02
Payer: MEDICARE

## 2025-08-04 ENCOUNTER — LAB VISIT (OUTPATIENT)
Dept: LAB | Facility: OTHER | Age: 75
End: 2025-08-04
Attending: INTERNAL MEDICINE
Payer: MEDICARE

## 2025-08-04 ENCOUNTER — TELEPHONE (OUTPATIENT)
Dept: HEMATOLOGY/ONCOLOGY | Facility: CLINIC | Age: 75
End: 2025-08-04
Payer: MEDICARE

## 2025-08-04 ENCOUNTER — PATIENT MESSAGE (OUTPATIENT)
Dept: GASTROENTEROLOGY | Facility: CLINIC | Age: 75
End: 2025-08-04
Payer: MEDICARE

## 2025-08-04 DIAGNOSIS — D50.0 IRON DEFICIENCY ANEMIA SECONDARY TO BLOOD LOSS (CHRONIC): ICD-10-CM

## 2025-08-04 DIAGNOSIS — D72.829 LEUKOCYTOSIS, UNSPECIFIED TYPE: ICD-10-CM

## 2025-08-04 DIAGNOSIS — D53.9 MACROCYTIC ANEMIA: ICD-10-CM

## 2025-08-04 LAB
ABSOLUTE EOSINOPHIL (OHS): 0.32 K/UL
ABSOLUTE MONOCYTE (OHS): 2.35 K/UL (ref 0.3–1)
ABSOLUTE NEUTROPHIL COUNT (OHS): 3.86 K/UL (ref 1.8–7.7)
ALBUMIN SERPL BCP-MCNC: 3.2 G/DL (ref 3.5–5.2)
ALP SERPL-CCNC: 91 UNIT/L (ref 40–150)
ALT SERPL W/O P-5'-P-CCNC: 11 UNIT/L (ref 10–44)
ANION GAP (OHS): 7 MMOL/L (ref 8–16)
ANISOCYTOSIS BLD QL SMEAR: SLIGHT
AST SERPL-CCNC: 24 UNIT/L (ref 11–45)
BASOPHILS # BLD AUTO: 0.26 K/UL
BASOPHILS NFR BLD AUTO: 2.8 %
BILIRUB SERPL-MCNC: 0.6 MG/DL (ref 0.1–1)
BUN SERPL-MCNC: 17 MG/DL (ref 8–23)
CALCIUM SERPL-MCNC: 8.2 MG/DL (ref 8.7–10.5)
CHLORIDE SERPL-SCNC: 105 MMOL/L (ref 95–110)
CO2 SERPL-SCNC: 26 MMOL/L (ref 23–29)
CREAT SERPL-MCNC: 1 MG/DL (ref 0.5–1.4)
ERYTHROCYTE [DISTWIDTH] IN BLOOD BY AUTOMATED COUNT: 26.6 % (ref 11.5–14.5)
FERRITIN SERPL-MCNC: 243 NG/ML (ref 20–300)
GFR SERPLBLD CREATININE-BSD FMLA CKD-EPI: 59 ML/MIN/1.73/M2
GLUCOSE SERPL-MCNC: 97 MG/DL (ref 70–110)
HAPTOGLOB SERPL-MCNC: 134 MG/DL (ref 30–250)
HCT VFR BLD AUTO: 32.9 % (ref 37–48.5)
HGB BLD-MCNC: 9.5 GM/DL (ref 12–16)
IMM GRANULOCYTES # BLD AUTO: 0.12 K/UL (ref 0–0.04)
IMM GRANULOCYTES NFR BLD AUTO: 1.3 % (ref 0–0.5)
IRON SATN MFR SERPL: 17 % (ref 20–50)
IRON SERPL-MCNC: 56 UG/DL (ref 30–160)
LDH SERPL-CCNC: 297 U/L (ref 110–260)
LYMPHOCYTES # BLD AUTO: 2.26 K/UL (ref 1–4.8)
MCH RBC QN AUTO: 29.9 PG (ref 27–31)
MCHC RBC AUTO-ENTMCNC: 28.9 G/DL (ref 32–36)
MCV RBC AUTO: 104 FL (ref 82–98)
NUCLEATED RBC (/100WBC) (OHS): 0 /100 WBC
OVALOCYTES BLD QL SMEAR: NORMAL
PLATELET # BLD AUTO: 391 K/UL (ref 150–450)
PLATELET BLD QL SMEAR: NORMAL
PMV BLD AUTO: 10.1 FL (ref 9.2–12.9)
POIKILOCYTOSIS BLD QL SMEAR: SLIGHT
POTASSIUM SERPL-SCNC: 4.3 MMOL/L (ref 3.5–5.1)
PROT SERPL-MCNC: 6.8 GM/DL (ref 6–8.4)
RBC # BLD AUTO: 3.18 M/UL (ref 4–5.4)
RELATIVE EOSINOPHIL (OHS): 3.5 %
RELATIVE LYMPHOCYTE (OHS): 24.6 % (ref 18–48)
RELATIVE MONOCYTE (OHS): 25.6 % (ref 4–15)
RELATIVE NEUTROPHIL (OHS): 42.2 % (ref 38–73)
RETICS/RBC NFR AUTO: 8 % (ref 0.5–2.5)
SCHISTOCYTES BLD QL SMEAR: NORMAL
SODIUM SERPL-SCNC: 138 MMOL/L (ref 136–145)
TIBC SERPL-MCNC: 336 UG/DL (ref 250–450)
TRANSFERRIN SERPL-MCNC: 227 MG/DL (ref 200–375)
WBC # BLD AUTO: 9.17 K/UL (ref 3.9–12.7)

## 2025-08-04 PROCEDURE — 85025 COMPLETE CBC W/AUTO DIFF WBC: CPT | Mod: TXP

## 2025-08-04 PROCEDURE — 83010 ASSAY OF HAPTOGLOBIN QUANT: CPT | Mod: TXP

## 2025-08-04 PROCEDURE — 85045 AUTOMATED RETICULOCYTE COUNT: CPT | Mod: TXP

## 2025-08-04 PROCEDURE — 82728 ASSAY OF FERRITIN: CPT | Mod: TXP

## 2025-08-04 PROCEDURE — 88185 FLOWCYTOMETRY/TC ADD-ON: CPT | Mod: 91,TXP

## 2025-08-04 PROCEDURE — 80053 COMPREHEN METABOLIC PANEL: CPT | Mod: TXP

## 2025-08-04 PROCEDURE — 84466 ASSAY OF TRANSFERRIN: CPT | Mod: TXP

## 2025-08-04 PROCEDURE — 82525 ASSAY OF COPPER: CPT | Mod: TXP

## 2025-08-04 PROCEDURE — 36415 COLL VENOUS BLD VENIPUNCTURE: CPT | Mod: TXP

## 2025-08-04 PROCEDURE — 83615 LACTATE (LD) (LDH) ENZYME: CPT | Mod: TXP

## 2025-08-05 DIAGNOSIS — D50.0 IRON DEFICIENCY ANEMIA SECONDARY TO BLOOD LOSS (CHRONIC): ICD-10-CM

## 2025-08-05 DIAGNOSIS — K40.30 INCARCERATED INGUINAL HERNIA: Primary | ICD-10-CM

## 2025-08-06 ENCOUNTER — HOSPITAL ENCOUNTER (OUTPATIENT)
Dept: RADIOLOGY | Facility: OTHER | Age: 75
Discharge: HOME | End: 2025-08-06
Attending: INTERNAL MEDICINE
Payer: MEDICARE

## 2025-08-06 DIAGNOSIS — R60.0 EDEMA OF LEFT LOWER EXTREMITY: ICD-10-CM

## 2025-08-06 LAB
LAB FLOW CYTOMETRY ANTIBODIES ANALYZED (OHS): NORMAL
LAB FLOW CYTOMETRY COMMENT (OHS): NORMAL
LAB FLOW CYTOMETRY INTERPRETATION (OHS): NORMAL
LABORATORY COMMENT REPORT: NORMAL

## 2025-08-06 PROCEDURE — 93971 EXTREMITY STUDY: CPT | Mod: TC,LT,TXP

## 2025-08-06 PROCEDURE — 93971 EXTREMITY STUDY: CPT | Mod: 26,LT,NTX, | Performed by: RADIOLOGY

## 2025-08-07 ENCOUNTER — OFFICE VISIT (OUTPATIENT)
Dept: PALLIATIVE MEDICINE | Facility: CLINIC | Age: 75
End: 2025-08-07
Payer: MEDICARE

## 2025-08-07 ENCOUNTER — TELEPHONE (OUTPATIENT)
Dept: PALLIATIVE MEDICINE | Facility: CLINIC | Age: 75
End: 2025-08-07

## 2025-08-07 ENCOUNTER — PATIENT MESSAGE (OUTPATIENT)
Dept: TRANSPLANT | Facility: CLINIC | Age: 75
End: 2025-08-07
Payer: MEDICARE

## 2025-08-07 ENCOUNTER — TELEPHONE (OUTPATIENT)
Dept: HEMATOLOGY/ONCOLOGY | Facility: CLINIC | Age: 75
End: 2025-08-07
Payer: MEDICARE

## 2025-08-07 DIAGNOSIS — J43.9 PULMONARY EMPHYSEMA, UNSPECIFIED EMPHYSEMA TYPE: Primary | ICD-10-CM

## 2025-08-07 DIAGNOSIS — I70.222 CRITICAL LIMB ISCHEMIA OF LEFT LOWER EXTREMITY: ICD-10-CM

## 2025-08-07 DIAGNOSIS — Z51.5 PALLIATIVE CARE ENCOUNTER: ICD-10-CM

## 2025-08-07 NOTE — PATIENT INSTRUCTIONS
[facility] [employer]   [email or web address]     [facility]    [address] [place] [address]  [phone]  Hours  Monday - Friday: 9:00 AM - 9:00 PM  Saturday: 9:00 AM - 7:00 PM  Sunday: 11:00 AM - 5:00 PM

## 2025-08-07 NOTE — PROGRESS NOTES
Palliative Medicine Clinic Note        Consult Requested By: Aaareferral Self      Reason for Consult/Chief Complaint: PAD/COPD, lower extremity pain    Chief Complaint: No chief complaint on file.    The patient location is: [place], [place]      Visit type: audiovisual    Face to Face time with patient: 26  40 minutes of total time spent on the encounter, which includes face to face time and non-face to face time preparing to see the patient (eg, review of tests), Obtaining and/or reviewing separately obtained history, Documenting clinical information in the electronic or other health record, Independently interpreting results (not separately reported) and communicating results to the patient/family/caregiver, or Care coordination (not separately reported).       Each patient provided with medical services by telemedicine is:  (1) informed of the relationship between the physician and patient and the respective role of any other health care provider with respect to management of the patient; and (2) notified that he or she may decline to receive medical services by telemedicine and may withdraw from such care at any time.              ASSESSMENT/PLAN:      Plan/Recommendations:    Diagnoses and all orders for this visit:    Pulmonary emphysema, unspecified emphysema type    Palliative care encounter    Critical limb ischemia of left lower extremity        Assessment & Plan    CHRONIC PAIN:  - Evaluated pain management regimen, noting effectiveness of gabapentin and occasional use of hydrocodone for breakthrough pain. However, opioids result in   - Started therapeutic marijuana with initial dosage of 5 mg THC and 5 mg CBD at bedtime, with option to adjust based on response and dispensary guidance.  - Provided information on onset and duration of effects for oral THC/CBD products (1-2 hours for full effect).  - Referred to [facility] on [facility] for therapeutic marijuana.  - Contact  the office if patient has not heard from the dispensary by Friday.    CHRONIC OBSTRUCTIVE PULMONARY DISEASE:  - Advised against smoking or vaping marijuana to avoid exacerbating existing lung issues.    CARDIOVASCULAR CONCERNS:  - Explained emerging evidence on potential cardiovascular risks associated with medical marijuana use, noting limitations in current research.    LIFESTYLE CONSIDERATIONS:  - Recommend spacing alcohol consumption (e.g., evening glass of wine) and therapeutic marijuana use by about 1 hour.  - Patient to monitor for increased drowsiness or confusion when starting therapeutic marijuana.    MEDICATION MANAGEMENT:  - Assessed potential drug interactions, particularly noting CYP enzyme metabolism of quetiapine and mirtazapine in conjunction with THC/CBD through liver enzymes (CY).    FOLLOW-UP:  - Follow up in 1 year to reassess therapeutic marijuana efficacy and renew referral if needed.         Advance Care Planning    Advance Directives:   Living Will: No    [place]POST: No    Do Not Resuscitate Status: No    Medical Power of : No    Agent's Name:  Sandie Floyd (Spouse)   Agent's Contact Number:  [phone] (Home Phone)    Decision Making:  Patient answered questions  Goals of Care: What is most important right now is to focus on spending time at home, remaining as independent as possible, symptom/pain control, extending life as long as possible, even it it means sacrificing quality, comfort and QOL . Accordingly, we have decided that the best plan to meet the patient's goals includes continuing with treatment.                      SUBJECTIVE:      History of Present Illness / Interval History:  Jessica Floyd is 75 y.o. female with COPD and PAD with lower extremity arterial insufficiency, not a candidate for revascularization. .  Presents to Palliative Care Clinic for physical symptoms.       25:   History of Present Illness    CHIEF COMPLAINT:  - Patient presents virtually today  for evaluation of pain management options, including the potential use of therapeutic marijuana for peripheral arterial disease-related pain.    HISTORY OBTAINED FROM:  - Patient, Mo (Spouse), Diya (Daughter)    HPI:  Patient is a 75-year-old woman with a history of emphysema and lower extremity arterial insufficiency. She reports significant pain in her lower extremities due to peripheral arterial disease. She slept better last night, requiring less pain medication than usual, and has become adept at controlling her breathing related to her COPD diagnosis. Patient is currently taking gabapentin for pain management, which she finds very effective, especially when timed correctly to prevent pain onset. She reports GI problems that make her sensitive to opioid medications, experiencing nausea when taking hydrocodone tablets unless she has a full stomach. Patient expresses interest in exploring therapeutic marijuana as an additional pain management option.    GOALS OF CARE/ADVANCED DIRECTIVES:  - daughter, Diya, and spouse, Mo, were present during the conversation, indicating family involvement in care decisions  - lives at home with spouse, who assists with care  - expressed interest in trying medical marijuana to improve quality of life and manage pain from peripheral arterial disease  - goal is to effectively manage pain while minimizing side effects from medications  - enjoys having a glass of wine in the evening on the porch, indicating a desire to maintain some normalcy and pleasure in daily life    ACTIVITIES OF DAILY LIVING:  - sleeps better with less pain medication than usual  - able to control breathing well despite having COPD  - experiences pain in lower extremities due to peripheral arterial disease, which limits mobility  - takes gabapentin for pain management, keeping it by the bedside for easy access  - experiences nausea when taking opioids, especially on an empty stomach  - has GI  "problems    MEDICATIONS:  - Medications  - Quetiapine, taken at bedtime, for anxiety  - Pantoprazole  - Mirtazapine, taken at bedtime, for anxiety  - Gabapentin, taken as needed, for pain  - Furosemide  - Hydrocodone, taken as needed, for pain, causes nausea if not taken with food    ALLERGIES:  - Allergies    MEDICAL HISTORY:  - Emphysema  - Lower extremity arterial insufficiency  - COPD  - Peripheral arterial disease  - GI problems  - Anxiety    SOCIAL HISTORY:  - Alcohol Use: Patient has a glass of wine in the evening  - Marital Status:  to Mr. Hooper (referred to as "Mo")           ROS:  Review of Systems   Gastrointestinal:  Negative for constipation.   Psychiatric/Behavioral:  The patient is nervous/anxious.        Review of Symptoms      Symptom Assessment (ESAS 0-10 Scale)  Pain:  5  Dyspnea:  0  Anxiety:  0  Nausea:  0  Depression:  0  Anorexia:  0  Fatigue:  0  Insomnia:  0  Restlessness:  0  Agitation:  0     CAM / Delirium:  Negative  Constipation:  Negative  Diarrhea:  Negative    Anxiety:  Is nervous/anxious  Constipation:  No constipation    Bowel Management Plan (BMP):  No      Pain Assessment:    Location(s): leg    Leg       Location: left        Quality: Burning, aching, sharp and pressure-like        Quantity: 8/10 in intensity        Chronicity: Onset 5 month(s) ago, gradually worsening        Aggravating Factors: Activity        Alleviating Factors: Opiates        Associated Symptoms: None    Modified Jessica Scale:  0    Living Arrangements:  Lives with spouse    Psychosocial/Cultural:   See Palliative Psychosocial Note: No  - Alcohol Use: Patient enjoys sitting on the porch with a glass of wine. Occasionally has 1-2 small glasses.   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   - Marital Status:  for 57 years     **Primary  to Follow**  Palliative Care  Consult: " No          External  database queried on 08/07/2025  by Tre Glez     Medications:  Current Medications[1]    Review of patient's allergies indicates:   Allergen Reactions    Hydromorphone Anxiety, Other (See Comments) and Hallucinations     Severe agitation           OBJECTIVE:         Physical Exam:  Vitals:      Physical Exam    Physical Exam            Unable to perform full physical exam due to telemedicine visit.  Limited exam:  Gen: NAD; pleasant and engaged conversation; no signs of discomfort  Non diaphoretic  Speech normal  No increased work of breathing  No cyanosis          I spent a total of 40 minutes on the day of the visit. This includes face to face time in discussion of goals of care, symptom assessment, coordination of care and emotional support.  This also includes non-face to face time preparing to see the patient (eg, review of tests/imaging), obtaining and/or reviewing separately obtained history, documenting clinical information in the electronic or other health record, independently interpreting results and communicating results to the patient/family/caregiver, or care coordinator.     Additional 6 min time spent on a voluntary advance care planning and /or goals of care discussion, providing emotional support, formulating and communicating prognosis and exploring burden/benefit of various approaches of treatment.     This note was generated with the assistance of ambient listening technology. Verbal consent was obtained by the patient and accompanying visitor(s) for the recording of patient appointment to facilitate this note. I attest to having reviewed and edited the generated note for accuracy, though some syntax or spelling errors may persist. Please contact the author of this note for any clarification.      Tre Glez,          [1]   Current Outpatient Medications:     cannabidiol, CBD, (CANNABIDIOL ORAL), Take by mouth., Disp: , Rfl:     allopurinoL (ZYLOPRIM)  300 MG tablet, Take 1 tablet (300 mg total) by mouth once daily., Disp: 90 tablet, Rfl: 0    aspirin (ECOTRIN) 81 MG EC tablet, Take 1 tablet (81 mg total) by mouth once daily., Disp: 30 tablet, Rfl: 11    atorvastatin (LIPITOR) 80 MG tablet, TAKE 1 TABLET BY MOUTH EVERY DAY, Disp: 90 tablet, Rfl: 3    [Paused] carvediloL (COREG) 6.25 MG tablet, Take 1 tablet (6.25 mg total) by mouth 2 (two) times daily., Disp: 180 tablet, Rfl: 3    ciclopirox (PENLAC) 8 % Soln, Apply topically nightly., Disp: 6.6 mL, Rfl: 11    colchicine (COLCRYS) 0.6 mg tablet, TAKE 1 TABLET (0.6 MG TOTAL) BY MOUTH ONCE DAILY. AS NEEDED FOR GOUT, Disp: 90 tablet, Rfl: 3    cyanocobalamin 1,000 mcg/mL injection, Inject 1,000 mcg into the muscle every 7 days., Disp: 10 mL, Rfl: 5    famotidine (PEPCID) 40 MG tablet, Take 1 tablet (40 mg total) by mouth once daily. Take once a day at lunchtime., Disp: 90 tablet, Rfl: 3    fluticasone furoate-vilanteroL (BREO) 100-25 mcg/dose diskus inhaler, Inhale 1 puff into the lungs., Disp: , Rfl:     fluticasone propionate (FLONASE) 50 mcg/actuation nasal spray, 2 sprays (100 mcg total) by Each Nostril route once daily., Disp: , Rfl:     fluticasone-umeclidin-vilanter (TRELEGY ELLIPTA) 200-62.5-25 mcg inhaler, Inhale 1 puff into the lungs once daily., Disp: 60 each, Rfl: 11    furosemide (LASIX) 20 MG tablet, TAKE 1 TABLET (20 MG TOTAL) BY MOUTH 2 (TWO) TIMES DAILY AS NEEDED (SWELLING, WEIGHT GAIN, DYSPNEA)., Disp: 180 tablet, Rfl: 1    gabapentin (NEURONTIN) 300 MG capsule, Take 1 capsule (300 mg total) by mouth 3 (three) times daily., Disp: 90 capsule, Rfl: 0    GLYDO 2 % JelP urojet, SMARTSIG:Daily, Disp: , Rfl:     HYDROcodone-acetaminophen (NORCO) 5-325 mg per tablet, Take 1 tablet by mouth every 6 (six) hours as needed for Pain., Disp: 28 tablet, Rfl: 0    LIDOcaine 4 % Gel, Apply 1 g topically 2 (two) times daily as needed (foot pain)., Disp: , Rfl:     LIDOcaine-prilocaine (EMLA) cream, Apply topically  as needed., Disp: 30 g, Rfl: 3    mirtazapine (REMERON) 7.5 MG Tab, Take 1 tablet (7.5 mg total) by mouth every evening., Disp: 90 tablet, Rfl: 1    montelukast (SINGULAIR) 10 mg tablet, Take 10 mg by mouth every evening., Disp: , Rfl:     ondansetron (ZOFRAN-ODT) 8 MG TbDL, Take 1 tablet (8 mg total) by mouth every 6 (six) hours as needed (nausea)., Disp: 30 tablet, Rfl: 1    pantoprazole (PROTONIX) 40 MG tablet, Take 1 tablet (40 mg total) by mouth 2 (two) times daily., Disp: 180 tablet, Rfl: 3    QUEtiapine (SEROQUEL) 50 MG tablet, Take 1 tablet (50 mg total) by mouth 2 (two) times daily., Disp: 180 tablet, Rfl: 1    syringe, disposable, 1 mL Syrg, 1 Stick by Misc.(Non-Drug; Combo Route) route once a week., Disp: 25 each, Rfl: 1    tiotropium bromide (SPIRIVA RESPIMAT) 2.5 mcg/actuation inhaler, INHALE 2 PUFFS BY MOUTH INTO THE LUNGS DAILY, Disp: 4 g, Rfl: 2  No current facility-administered medications for this visit.    Facility-Administered Medications Ordered in Other Visits:     mupirocin 2 % ointment, , Nasal, On Call Procedure, Kang Diaz MD, Given at 10/25/23 1045    tranexamic acid (CYKLOKAPRON) 1,000 mg in sodium chloride 0.9 % 100 mL IVPB (MB+), 1,000 mg, Intravenous, Once, Kang Diaz MD    tranexamic acid (CYKLOKAPRON) 1,000 mg in sodium chloride 0.9 % 100 mL IVPB (MB+), 1,000 mg, Intravenous, Once, Kang Diaz MD

## 2025-08-07 NOTE — TELEPHONE ENCOUNTER
Sent referral to Rosy Beck  Your fax has been successfully sent to [phone] at [phone].    RightFax E-Mail [place]<[email or web address]>  Ester Charles  Your fax has been successfully sent to [phone] at [phone].  ------------------------------------------------------------  From: areli

## 2025-08-08 ENCOUNTER — PATIENT MESSAGE (OUTPATIENT)
Dept: GASTROENTEROLOGY | Facility: CLINIC | Age: 75
End: 2025-08-08
Payer: MEDICARE

## 2025-08-08 ENCOUNTER — HOSPITAL ENCOUNTER (OUTPATIENT)
Dept: RADIOLOGY | Facility: HOSPITAL | Age: 75
Discharge: HOME | End: 2025-08-08
Attending: INTERNAL MEDICINE
Payer: MEDICARE

## 2025-08-08 DIAGNOSIS — K40.30 INCARCERATED INGUINAL HERNIA: ICD-10-CM

## 2025-08-08 LAB — W COPPER: 1871 UG/L

## 2025-08-08 PROCEDURE — 76882 US LMTD JT/FCL EVL NVASC XTR: CPT | Mod: TC,LT,TXP

## 2025-08-08 PROCEDURE — 76882 US LMTD JT/FCL EVL NVASC XTR: CPT | Mod: 26,LT,NTX, | Performed by: INTERNAL MEDICINE

## 2025-08-14 ENCOUNTER — TELEPHONE (OUTPATIENT)
Dept: WOUND CARE | Facility: CLINIC | Age: 75
End: 2025-08-14
Payer: MEDICARE

## 2025-08-14 RX ORDER — GABAPENTIN 300 MG/1
300 CAPSULE ORAL 3 TIMES DAILY
Qty: 90 CAPSULE | Refills: 0 | Status: SHIPPED | OUTPATIENT
Start: 2025-08-14

## 2025-08-19 ENCOUNTER — LAB VISIT (OUTPATIENT)
Dept: LAB | Facility: OTHER | Age: 75
End: 2025-08-19
Attending: INTERNAL MEDICINE
Payer: MEDICARE

## 2025-08-19 ENCOUNTER — PATIENT MESSAGE (OUTPATIENT)
Dept: INTERNAL MEDICINE | Facility: CLINIC | Age: 75
End: 2025-08-19
Payer: MEDICARE

## 2025-08-19 DIAGNOSIS — D50.0 IRON DEFICIENCY ANEMIA SECONDARY TO BLOOD LOSS (CHRONIC): ICD-10-CM

## 2025-08-19 DIAGNOSIS — G89.29 CHRONIC LEFT SHOULDER PAIN: ICD-10-CM

## 2025-08-19 DIAGNOSIS — M25.512 CHRONIC LEFT SHOULDER PAIN: ICD-10-CM

## 2025-08-19 LAB
ABSOLUTE EOSINOPHIL (OHS): 0.81 K/UL
ABSOLUTE MONOCYTE (OHS): 2.18 K/UL (ref 0.3–1)
ABSOLUTE NEUTROPHIL COUNT (OHS): 4.03 K/UL (ref 1.8–7.7)
ALBUMIN SERPL BCP-MCNC: 3.2 G/DL (ref 3.5–5.2)
ALP SERPL-CCNC: 77 UNIT/L (ref 40–150)
ALT SERPL W/O P-5'-P-CCNC: 10 UNIT/L (ref 10–44)
ANION GAP (OHS): 5 MMOL/L (ref 8–16)
AST SERPL-CCNC: 43 UNIT/L (ref 11–45)
BASOPHILS # BLD AUTO: 0.22 K/UL
BASOPHILS NFR BLD AUTO: 2.3 %
BILIRUB SERPL-MCNC: 0.6 MG/DL (ref 0.1–1)
BUN SERPL-MCNC: 16 MG/DL (ref 8–23)
CALCIUM SERPL-MCNC: 9.1 MG/DL (ref 8.7–10.5)
CHLORIDE SERPL-SCNC: 105 MMOL/L (ref 95–110)
CO2 SERPL-SCNC: 28 MMOL/L (ref 23–29)
CREAT SERPL-MCNC: 0.9 MG/DL (ref 0.5–1.4)
ERYTHROCYTE [DISTWIDTH] IN BLOOD BY AUTOMATED COUNT: 24 % (ref 11.5–14.5)
FERRITIN SERPL-MCNC: 80 NG/ML (ref 20–300)
GFR SERPLBLD CREATININE-BSD FMLA CKD-EPI: >60 ML/MIN/1.73/M2
GLUCOSE SERPL-MCNC: 83 MG/DL (ref 70–110)
HCT VFR BLD AUTO: 35.4 % (ref 37–48.5)
HGB BLD-MCNC: 10.5 GM/DL (ref 12–16)
IMM GRANULOCYTES # BLD AUTO: 0.09 K/UL (ref 0–0.04)
IMM GRANULOCYTES NFR BLD AUTO: 0.9 % (ref 0–0.5)
IRON SATN MFR SERPL: 10 % (ref 20–50)
IRON SERPL-MCNC: 36 UG/DL (ref 30–160)
LYMPHOCYTES # BLD AUTO: 2.17 K/UL (ref 1–4.8)
MCH RBC QN AUTO: 30.5 PG (ref 27–31)
MCHC RBC AUTO-ENTMCNC: 29.7 G/DL (ref 32–36)
MCV RBC AUTO: 103 FL (ref 82–98)
NUCLEATED RBC (/100WBC) (OHS): 0 /100 WBC
PLATELET # BLD AUTO: 297 K/UL (ref 150–450)
PMV BLD AUTO: 10.4 FL (ref 9.2–12.9)
POTASSIUM SERPL-SCNC: 4.2 MMOL/L (ref 3.5–5.1)
PROT SERPL-MCNC: 6.8 GM/DL (ref 6–8.4)
RBC # BLD AUTO: 3.44 M/UL (ref 4–5.4)
RELATIVE EOSINOPHIL (OHS): 8.5 %
RELATIVE LYMPHOCYTE (OHS): 22.8 % (ref 18–48)
RELATIVE MONOCYTE (OHS): 22.9 % (ref 4–15)
RELATIVE NEUTROPHIL (OHS): 42.6 % (ref 38–73)
SODIUM SERPL-SCNC: 138 MMOL/L (ref 136–145)
TIBC SERPL-MCNC: 366 UG/DL (ref 250–450)
TRANSFERRIN SERPL-MCNC: 247 MG/DL (ref 200–375)
WBC # BLD AUTO: 9.5 K/UL (ref 3.9–12.7)

## 2025-08-19 PROCEDURE — 83540 ASSAY OF IRON: CPT | Mod: TXP

## 2025-08-19 PROCEDURE — 85025 COMPLETE CBC W/AUTO DIFF WBC: CPT | Mod: TXP

## 2025-08-19 PROCEDURE — 82728 ASSAY OF FERRITIN: CPT | Mod: TXP

## 2025-08-19 PROCEDURE — 84460 ALANINE AMINO (ALT) (SGPT): CPT | Mod: TXP

## 2025-08-19 PROCEDURE — 36415 COLL VENOUS BLD VENIPUNCTURE: CPT | Mod: TXP

## 2025-08-20 ENCOUNTER — TELEPHONE (OUTPATIENT)
Dept: HEMATOLOGY/ONCOLOGY | Facility: CLINIC | Age: 75
End: 2025-08-20
Payer: MEDICARE

## 2025-08-20 ENCOUNTER — DOCUMENT SCAN (OUTPATIENT)
Dept: HOME HEALTH SERVICES | Facility: HOSPITAL | Age: 75
End: 2025-08-20
Payer: MEDICARE

## 2025-08-21 ENCOUNTER — TELEPHONE (OUTPATIENT)
Dept: HEMATOLOGY/ONCOLOGY | Facility: CLINIC | Age: 75
End: 2025-08-21
Payer: MEDICARE

## 2025-08-21 ENCOUNTER — OFFICE VISIT (OUTPATIENT)
Dept: HEMATOLOGY/ONCOLOGY | Facility: CLINIC | Age: 75
End: 2025-08-21
Payer: MEDICARE

## 2025-08-21 VITALS
BODY MASS INDEX: 19.17 KG/M2 | HEIGHT: 63 IN | RESPIRATION RATE: 18 BRPM | DIASTOLIC BLOOD PRESSURE: 67 MMHG | OXYGEN SATURATION: 96 % | HEART RATE: 87 BPM | TEMPERATURE: 98 F | SYSTOLIC BLOOD PRESSURE: 113 MMHG

## 2025-08-21 DIAGNOSIS — E53.8 VITAMIN B12 DEFICIENCY: ICD-10-CM

## 2025-08-21 DIAGNOSIS — R63.4 WEIGHT LOSS: ICD-10-CM

## 2025-08-21 DIAGNOSIS — D50.0 IRON DEFICIENCY ANEMIA SECONDARY TO BLOOD LOSS (CHRONIC): Primary | ICD-10-CM

## 2025-08-21 DIAGNOSIS — K40.30 INCARCERATED INGUINAL HERNIA: ICD-10-CM

## 2025-08-21 DIAGNOSIS — D72.10 EOSINOPHILIA, UNSPECIFIED TYPE: ICD-10-CM

## 2025-08-21 DIAGNOSIS — I70.222 CRITICAL LIMB ISCHEMIA OF LEFT LOWER EXTREMITY: ICD-10-CM

## 2025-08-21 DIAGNOSIS — J43.8 OTHER EMPHYSEMA: Chronic | ICD-10-CM

## 2025-08-21 DIAGNOSIS — R91.8 PULMONARY NODULES: ICD-10-CM

## 2025-08-21 DIAGNOSIS — R60.0 LOWER EXTREMITY EDEMA: ICD-10-CM

## 2025-08-21 DIAGNOSIS — D72.821 MONOCYTOSIS: ICD-10-CM

## 2025-08-21 PROCEDURE — 99999 PR PBB SHADOW E&M-EST. PATIENT-LVL V: CPT | Mod: PBBFAC,,, | Performed by: INTERNAL MEDICINE

## 2025-08-21 PROCEDURE — 99215 OFFICE O/P EST HI 40 MIN: CPT | Mod: S$GLB,,, | Performed by: INTERNAL MEDICINE

## 2025-08-21 PROCEDURE — 1125F AMNT PAIN NOTED PAIN PRSNT: CPT | Mod: CPTII,S$GLB,, | Performed by: INTERNAL MEDICINE

## 2025-08-21 PROCEDURE — 1159F MED LIST DOCD IN RCRD: CPT | Mod: CPTII,S$GLB,, | Performed by: INTERNAL MEDICINE

## 2025-08-21 PROCEDURE — 3078F DIAST BP <80 MM HG: CPT | Mod: CPTII,S$GLB,, | Performed by: INTERNAL MEDICINE

## 2025-08-21 PROCEDURE — 3074F SYST BP LT 130 MM HG: CPT | Mod: CPTII,S$GLB,, | Performed by: INTERNAL MEDICINE

## 2025-08-21 PROCEDURE — 1101F PT FALLS ASSESS-DOCD LE1/YR: CPT | Mod: CPTII,S$GLB,, | Performed by: INTERNAL MEDICINE

## 2025-08-21 PROCEDURE — 1160F RVW MEDS BY RX/DR IN RCRD: CPT | Mod: CPTII,S$GLB,, | Performed by: INTERNAL MEDICINE

## 2025-08-21 PROCEDURE — 3288F FALL RISK ASSESSMENT DOCD: CPT | Mod: CPTII,S$GLB,, | Performed by: INTERNAL MEDICINE

## 2025-08-22 ENCOUNTER — PATIENT MESSAGE (OUTPATIENT)
Dept: SPORTS MEDICINE | Facility: CLINIC | Age: 75
End: 2025-08-22
Payer: MEDICARE

## 2025-08-22 PROBLEM — R91.8 PULMONARY NODULES: Status: ACTIVE | Noted: 2025-08-22

## 2025-08-22 RX ORDER — ALLOPURINOL 300 MG/1
300 TABLET ORAL DAILY
Qty: 90 TABLET | Refills: 1 | Status: SHIPPED | OUTPATIENT
Start: 2025-08-22

## 2025-08-25 ENCOUNTER — APPOINTMENT (OUTPATIENT)
Dept: RADIOLOGY | Facility: OTHER | Age: 75
End: 2025-08-25
Attending: PHYSICIAN ASSISTANT
Payer: MEDICARE

## 2025-08-25 ENCOUNTER — DOCUMENT SCAN (OUTPATIENT)
Dept: HOME HEALTH SERVICES | Facility: HOSPITAL | Age: 75
End: 2025-08-25
Payer: MEDICARE

## 2025-08-25 ENCOUNTER — TELEPHONE (OUTPATIENT)
Dept: SPORTS MEDICINE | Facility: CLINIC | Age: 75
End: 2025-08-25
Payer: MEDICARE

## 2025-08-25 ENCOUNTER — OFFICE VISIT (OUTPATIENT)
Dept: SPORTS MEDICINE | Facility: CLINIC | Age: 75
End: 2025-08-25
Payer: MEDICARE

## 2025-08-25 ENCOUNTER — TELEPHONE (OUTPATIENT)
Dept: HEMATOLOGY/ONCOLOGY | Facility: CLINIC | Age: 75
End: 2025-08-25
Payer: MEDICARE

## 2025-08-25 VITALS
HEIGHT: 63 IN | SYSTOLIC BLOOD PRESSURE: 132 MMHG | DIASTOLIC BLOOD PRESSURE: 83 MMHG | WEIGHT: 108 LBS | HEART RATE: 84 BPM | BODY MASS INDEX: 19.14 KG/M2

## 2025-08-25 DIAGNOSIS — M19.011 LOCALIZED PRIMARY OSTEOARTHRITIS OF RIGHT SHOULDER REGION: ICD-10-CM

## 2025-08-25 DIAGNOSIS — M25.511 RIGHT SHOULDER PAIN, UNSPECIFIED CHRONICITY: ICD-10-CM

## 2025-08-25 DIAGNOSIS — M25.511 RIGHT SHOULDER PAIN, UNSPECIFIED CHRONICITY: Primary | ICD-10-CM

## 2025-08-25 DIAGNOSIS — M25.511 CHRONIC RIGHT SHOULDER PAIN: Primary | ICD-10-CM

## 2025-08-25 DIAGNOSIS — S46.011A TRAUMATIC COMPLETE TEAR OF RIGHT ROTATOR CUFF, INITIAL ENCOUNTER: ICD-10-CM

## 2025-08-25 DIAGNOSIS — G89.29 CHRONIC RIGHT SHOULDER PAIN: Primary | ICD-10-CM

## 2025-08-25 PROCEDURE — 1159F MED LIST DOCD IN RCRD: CPT | Mod: CPTII,S$GLB,TXP, | Performed by: PHYSICIAN ASSISTANT

## 2025-08-25 PROCEDURE — 73030 X-RAY EXAM OF SHOULDER: CPT | Mod: 26,RT,TXP, | Performed by: RADIOLOGY

## 2025-08-25 PROCEDURE — 3079F DIAST BP 80-89 MM HG: CPT | Mod: CPTII,S$GLB,TXP, | Performed by: PHYSICIAN ASSISTANT

## 2025-08-25 PROCEDURE — 20611 DRAIN/INJ JOINT/BURSA W/US: CPT | Mod: S$GLB,TXP,, | Performed by: PHYSICIAN ASSISTANT

## 2025-08-25 PROCEDURE — 1101F PT FALLS ASSESS-DOCD LE1/YR: CPT | Mod: CPTII,S$GLB,TXP, | Performed by: PHYSICIAN ASSISTANT

## 2025-08-25 PROCEDURE — 99213 OFFICE O/P EST LOW 20 MIN: CPT | Mod: 25,S$GLB,TXP, | Performed by: PHYSICIAN ASSISTANT

## 2025-08-25 PROCEDURE — 99999 PR PBB SHADOW E&M-EST. PATIENT-LVL IV: CPT | Mod: PBBFAC,TXP,, | Performed by: PHYSICIAN ASSISTANT

## 2025-08-25 PROCEDURE — 3075F SYST BP GE 130 - 139MM HG: CPT | Mod: CPTII,S$GLB,TXP, | Performed by: PHYSICIAN ASSISTANT

## 2025-08-25 PROCEDURE — 73030 X-RAY EXAM OF SHOULDER: CPT | Mod: TC,PN,RT,TXP

## 2025-08-25 PROCEDURE — 3288F FALL RISK ASSESSMENT DOCD: CPT | Mod: CPTII,S$GLB,TXP, | Performed by: PHYSICIAN ASSISTANT

## 2025-08-25 RX ORDER — BUPIVACAINE HYDROCHLORIDE 2.5 MG/ML
2 INJECTION, SOLUTION INFILTRATION; PERINEURAL
Status: DISCONTINUED | OUTPATIENT
Start: 2025-08-25 | End: 2025-08-25 | Stop reason: HOSPADM

## 2025-08-25 RX ORDER — TRIAMCINOLONE ACETONIDE 40 MG/ML
40 INJECTION, SUSPENSION INTRA-ARTICULAR; INTRAMUSCULAR
Status: DISCONTINUED | OUTPATIENT
Start: 2025-08-25 | End: 2025-08-25 | Stop reason: HOSPADM

## 2025-08-25 RX ADMIN — TRIAMCINOLONE ACETONIDE 40 MG: 40 INJECTION, SUSPENSION INTRA-ARTICULAR; INTRAMUSCULAR at 01:08

## 2025-08-25 RX ADMIN — BUPIVACAINE HYDROCHLORIDE 2 ML: 2.5 INJECTION, SOLUTION INFILTRATION; PERINEURAL at 01:08

## 2025-08-27 RX ORDER — TRAMADOL HYDROCHLORIDE 50 MG/1
50 TABLET, FILM COATED ORAL EVERY 8 HOURS PRN
Qty: 30 TABLET | Refills: 0 | Status: SHIPPED | OUTPATIENT
Start: 2025-08-27

## 2025-08-28 ENCOUNTER — TELEPHONE (OUTPATIENT)
Dept: ORTHOPEDICS | Facility: CLINIC | Age: 75
End: 2025-08-28
Payer: MEDICARE

## 2025-08-28 ENCOUNTER — HOSPITAL ENCOUNTER (OUTPATIENT)
Dept: RADIOLOGY | Facility: OTHER | Age: 75
Discharge: HOME | End: 2025-08-28
Attending: NURSE PRACTITIONER
Payer: MEDICARE

## 2025-08-28 ENCOUNTER — PATIENT MESSAGE (OUTPATIENT)
Dept: SPORTS MEDICINE | Facility: CLINIC | Age: 75
End: 2025-08-28
Payer: MEDICARE

## 2025-09-02 DIAGNOSIS — J43.9 PULMONARY EMPHYSEMA, UNSPECIFIED EMPHYSEMA TYPE: Chronic | ICD-10-CM

## 2025-09-02 DIAGNOSIS — Z51.5 PALLIATIVE CARE ENCOUNTER: ICD-10-CM

## 2025-09-02 DIAGNOSIS — R06.02 SOB (SHORTNESS OF BREATH): ICD-10-CM

## 2025-09-02 DIAGNOSIS — I70.222 CRITICAL LIMB ISCHEMIA OF LEFT LOWER EXTREMITY: ICD-10-CM

## 2025-09-03 ENCOUNTER — OFFICE VISIT (OUTPATIENT)
Dept: ORTHOPEDICS | Facility: CLINIC | Age: 75
End: 2025-09-03
Payer: MEDICARE

## 2025-09-03 VITALS — HEIGHT: 63 IN | WEIGHT: 108 LBS | BODY MASS INDEX: 19.14 KG/M2

## 2025-09-03 DIAGNOSIS — M81.6 LOCALIZED OSTEOPOROSIS OF LEQUESNE: ICD-10-CM

## 2025-09-03 DIAGNOSIS — K92.2 ACUTE GI BLEEDING: Chronic | ICD-10-CM

## 2025-09-03 DIAGNOSIS — E44.0 MODERATE MALNUTRITION: ICD-10-CM

## 2025-09-03 DIAGNOSIS — K21.9 GASTROESOPHAGEAL REFLUX DISEASE WITHOUT ESOPHAGITIS: Chronic | ICD-10-CM

## 2025-09-03 DIAGNOSIS — M80.80XA PATHOLOGICAL FRACTURE DUE TO OSTEOPOROSIS, UNSPECIFIED FRACTURE SITE, UNSPECIFIED OSTEOPOROSIS TYPE, INITIAL ENCOUNTER: Primary | ICD-10-CM

## 2025-09-03 DIAGNOSIS — M81.0 OSTEOPOROSIS, UNSPECIFIED OSTEOPOROSIS TYPE, UNSPECIFIED PATHOLOGICAL FRACTURE PRESENCE: ICD-10-CM

## 2025-09-03 DIAGNOSIS — I25.10 CORONARY ARTERY DISEASE INVOLVING NATIVE CORONARY ARTERY OF NATIVE HEART WITHOUT ANGINA PECTORIS: Chronic | ICD-10-CM

## 2025-09-03 DIAGNOSIS — J43.9 PULMONARY EMPHYSEMA, UNSPECIFIED EMPHYSEMA TYPE: Chronic | ICD-10-CM

## 2025-09-03 DIAGNOSIS — N18.31 CKD STAGE G3A/A1, GFR 45-59 AND ALBUMIN CREATININE RATIO <30 MG/G: ICD-10-CM

## 2025-09-03 PROCEDURE — 99999 PR PBB SHADOW E&M-EST. PATIENT-LVL II: CPT | Mod: PBBFAC,,, | Performed by: PHYSICIAN ASSISTANT

## 2025-09-03 RX ORDER — HYDROCODONE BITARTRATE AND ACETAMINOPHEN 5; 325 MG/1; MG/1
1 TABLET ORAL EVERY 6 HOURS PRN
Qty: 28 TABLET | Refills: 0 | Status: SHIPPED | OUTPATIENT
Start: 2025-09-03

## 2025-09-03 RX ORDER — ZOLEDRONIC ACID 5 MG/100ML
5 INJECTION, SOLUTION INTRAVENOUS
OUTPATIENT
Start: 2025-09-03 | End: 2025-09-03

## 2025-09-03 RX ORDER — SODIUM CHLORIDE 0.9 % (FLUSH) 0.9 %
10 SYRINGE (ML) INJECTION
OUTPATIENT
Start: 2025-09-03

## 2025-09-04 ENCOUNTER — PATIENT MESSAGE (OUTPATIENT)
Dept: INTERNAL MEDICINE | Facility: CLINIC | Age: 75
End: 2025-09-04
Payer: MEDICARE

## (undated) DEVICE — SUT PDS II 1 TP-1 VIL

## (undated) DEVICE — DRESSING AQUACEL AG RBBN 2X45

## (undated) DEVICE — SUT VICRYL+ 1 CT1 18IN

## (undated) DEVICE — TUBING HF INSUFFLATION W/ FLTR

## (undated) DEVICE — STAPLER SKIN PROXIMATE WIDE

## (undated) DEVICE — DRAPE SHOULDER BEACH CHAIR

## (undated) DEVICE — SUT 2-0 12-18IN SILK

## (undated) DEVICE — BLADE SAGITTAL 18 X 1.27 X 90M

## (undated) DEVICE — PULSAVAC ZIMMER

## (undated) DEVICE — BRUSH INTRAMEDULLARY

## (undated) DEVICE — COVER PROXIMA MAYO STAND

## (undated) DEVICE — SEALER LIGASURE IMPACT 18CM

## (undated) DEVICE — SPONGE LAP 18X18 PREWASHED

## (undated) DEVICE — Device

## (undated) DEVICE — DRAPE C-ARM ELAS CLIP 42X120IN

## (undated) DEVICE — SUT FIBERWIRE #5

## (undated) DEVICE — TROCAR ENDOPATH EXCEL DILATING

## (undated) DEVICE — MARKER SKIN STND TIP BLUE BARR

## (undated) DEVICE — SUT VICRYL PLUS 0 CT1 18IN

## (undated) DEVICE — TOWEL OR DISP STRL BLUE 4/PK

## (undated) DEVICE — ELECTRODE REM PLYHSV RETURN 9

## (undated) DEVICE — TUBE SUCTION YANKAUER

## (undated) DEVICE — SYR SLIP TIP 1CC

## (undated) DEVICE — TRAY MINOR GEN SURG OMC

## (undated) DEVICE — GLOVE BIOGEL ECLIPSE SZ 7

## (undated) DEVICE — SUT SILK 3-0 SH 18IN BLACK

## (undated) DEVICE — KIT TOTAL HIP HPOFH OMC

## (undated) DEVICE — SUT 0 VICRYL / CT-1

## (undated) DEVICE — APPLIER CLIP ENDO MED/LG 10MM

## (undated) DEVICE — DRESSING TRANS 4X4 TEGADERM

## (undated) DEVICE — POSITIONER IV ARMBOARD FOAM

## (undated) DEVICE — SYS CLSR DERMABOND PRINEO 22CM

## (undated) DEVICE — DRESSING AQUACEL AG 3.5X10IN

## (undated) DEVICE — SOL IRR NACL .9% 3000ML

## (undated) DEVICE — SUT MCRYL PLUS 4-0 PS2 27IN

## (undated) DEVICE — MASK FLYTE HOOD PEEL AWAY

## (undated) DEVICE — RELOAD PROXIMATE CUT BLUE 75MM

## (undated) DEVICE — GLOVE BIOGEL PI MICRO INDIC 7

## (undated) DEVICE — ELECTRODE EXTENDED BLADE

## (undated) DEVICE — TAPE SURG DURAPORE 2 X10YD

## (undated) DEVICE — CONTAINER SPECIMEN OR STER 4OZ

## (undated) DEVICE — DRAPE STERI INSTRUMENT 1018

## (undated) DEVICE — APPLICATOR CHLORAPREP ORN 26ML

## (undated) DEVICE — DRAPE U SPLIT SHEET 54X76IN

## (undated) DEVICE — TRAY CATH FOL SIL URIMTR 16FR

## (undated) DEVICE — GLASSES EYE PROTECTIVE

## (undated) DEVICE — TIP YANKAUERS BULB NO VENT

## (undated) DEVICE — SUT STRATAFIX 3-0 30CM

## (undated) DEVICE — KIT IRR SUCTION HND PIECE

## (undated) DEVICE — TROCAR ENDOPATH EXCEL

## (undated) DEVICE — SPONGE COTTON TRAY 4X4IN

## (undated) DEVICE — IRRIGATOR ENDOSCOPY DISP.

## (undated) DEVICE — NDL MAYO CAT 1/2 CIR #5

## (undated) DEVICE — COVER MAYO STAND REINFRCD 30

## (undated) DEVICE — RETRIEVER SUTURE HEWSON DISP

## (undated) DEVICE — CUTTER PROXIMATE BLUE 75MM

## (undated) DEVICE — SOL PVP-I SCRUB 7.5% 4OZ

## (undated) DEVICE — SUT 3-0 12-18IN SILK

## (undated) DEVICE — SUT VICRYL PLUS 2-0 CT1 18

## (undated) DEVICE — NDL HYPO REG 25G X 1 1/2

## (undated) DEVICE — SUT PDS PLUS 3-0 SH 27IN

## (undated) DEVICE — TROCAR ENDOPATH XCEL 12X100MM

## (undated) DEVICE — STAPLER SKIN PROXIMATE REG

## (undated) DEVICE — SUT CTD VICRYL VIL BR CR/SH

## (undated) DEVICE — COVER HD BACK TABLE 6FT

## (undated) DEVICE — DRAPE STERI-DRAPE 1000 17X11IN

## (undated) DEVICE — TRAY CATH 1-LYR URIMTR 16FR

## (undated) DEVICE — DRAPE ABDOMINAL TIBURON 14X11

## (undated) DEVICE — TIP IRR FEM CANAL CO-AXIAL

## (undated) DEVICE — GOWN POLY REINF BRTH SLV XL

## (undated) DEVICE — SUT VICRYL PLUS 3-0 SH 18IN

## (undated) DEVICE — PAD SHOULDER CARE POLAR

## (undated) DEVICE — BLADE 4 INCH EDGE UN-INS

## (undated) DEVICE — SUT MONOCRYL 3-0 PS-2 UND

## (undated) DEVICE — GLOVE BIOGEL SKINSENSE PI 7.5

## (undated) DEVICE — SOL BETADINE 5%

## (undated) DEVICE — GOWN SURGICAL X-LARGE

## (undated) DEVICE — SOL POVIDONE SCRUB IODINE 4 OZ

## (undated) DEVICE — PRESSURIZER HI VAC HIP KIT

## (undated) DEVICE — DRAPE STERI U-SHAPED 47X51IN

## (undated) DEVICE — BNDG COFLEX FOAM LF2 ST 6X5YD

## (undated) DEVICE — HOOD T7 W/ PEEL AWAY LENS

## (undated) DEVICE — SUT BLU BR 2 TAPERD NDL 1/2

## (undated) DEVICE — GAUZE SPONGE 4X4 12PLY

## (undated) DEVICE — KIT ANTIFOG W/SPONG & FLUID

## (undated) DEVICE — DRESSING ADH ISLAND 3.6 X 14

## (undated) DEVICE — DRAPE THREE-QUARTER 53X77IN

## (undated) DEVICE — SUT 3-0 VICRYL SH CR/8 18

## (undated) DEVICE — SUT 1 48IN PDS II VIO MONO

## (undated) DEVICE — TRAY GENERAL SURGERY OMC

## (undated) DEVICE — SUT VICRYL CTD 2-0 GI 27 SH

## (undated) DEVICE — GOWN POLY REINF BRTH SLV 3XL

## (undated) DEVICE — SOL NS 1000CC

## (undated) DEVICE — DRESSING TRANS 4X4 3/4

## (undated) DEVICE — DRAPE THREE-QTR REINF 53X77IN

## (undated) DEVICE — BLADE SAG DUAL 18MMX1.27MMX90M